# Patient Record
Sex: FEMALE | Race: BLACK OR AFRICAN AMERICAN | Employment: OTHER | URBAN - METROPOLITAN AREA
[De-identification: names, ages, dates, MRNs, and addresses within clinical notes are randomized per-mention and may not be internally consistent; named-entity substitution may affect disease eponyms.]

---

## 2017-01-03 ENCOUNTER — TELEPHONE (OUTPATIENT)
Dept: ENDOCRINOLOGY | Age: 72
End: 2017-01-03

## 2017-01-03 NOTE — TELEPHONE ENCOUNTER
I got the Arterial dopplers ,not vein study     Ultrasound shows blockage in the legs and would like her to see the vascular doctor who had placed stents in the leg  - if she doesnot have a VAscular surgeon  then refer to Vascular surgeon  At  for PAD    She is also supposed to get labs done for high calcium here - already ordered

## 2017-01-03 NOTE — TELEPHONE ENCOUNTER
Per Dr. Homer Sagastume, informed her of note below. Verbalized understanding. Stated she will have pt follow up with vascular doctor and pt will come in some time this week to have lab work done.  No further questions or concerns at this time

## 2017-01-03 NOTE — TELEPHONE ENCOUNTER
Patient's daughter called stating the patient had a circulation test done and also a venous doppler done on her legs for blood clots; she wants to know if we received the results and if anything needs to be done.

## 2017-01-13 ENCOUNTER — TELEPHONE (OUTPATIENT)
Dept: ENDOCRINOLOGY | Age: 72
End: 2017-01-13

## 2017-01-13 NOTE — TELEPHONE ENCOUNTER
Dr. Tereso May office says they received paperwork from our office regarding diabetic shoes. Dr. Tereso May office says the can not read the fax because writing is too small and to re-submit fax to [phone].

## 2017-03-10 RX ORDER — L-METHYLFOLATE-ALGAE-VIT B12-B6 CAP 3-90.314-2-35 MG 3-90.314-2-35 MG
CAP ORAL
Qty: 180 CAP | Refills: 9 | Status: SHIPPED | OUTPATIENT
Start: 2017-03-10 | End: 2019-04-08

## 2017-04-20 ENCOUNTER — ED HISTORICAL/CONVERTED ENCOUNTER (OUTPATIENT)
Dept: OTHER | Age: 72
End: 2017-04-20

## 2017-04-20 DIAGNOSIS — E11.65 TYPE 2 DIABETES MELLITUS WITH HYPERGLYCEMIA, WITH LONG-TERM CURRENT USE OF INSULIN (HCC): Primary | ICD-10-CM

## 2017-04-20 DIAGNOSIS — Z79.4 TYPE 2 DIABETES MELLITUS WITH HYPERGLYCEMIA, WITH LONG-TERM CURRENT USE OF INSULIN (HCC): Primary | ICD-10-CM

## 2017-04-20 RX ORDER — METFORMIN HYDROCHLORIDE 1000 MG/1
TABLET ORAL
Qty: 60 TAB | Refills: 11 | Status: SHIPPED | OUTPATIENT
Start: 2017-04-20 | End: 2017-12-19 | Stop reason: SDUPTHER

## 2017-06-30 ENCOUNTER — HOSPITAL ENCOUNTER (OUTPATIENT)
Dept: LAB | Age: 72
Discharge: HOME | End: 2017-06-30
Payer: MEDICARE

## 2017-06-30 PROCEDURE — 83036 HEMOGLOBIN GLYCOSYLATED A1C: CPT

## 2017-06-30 PROCEDURE — 36415 COLL VENOUS BLD VENIPUNCTURE: CPT

## 2017-06-30 PROCEDURE — 80061 LIPID PANEL: CPT

## 2017-06-30 PROCEDURE — 80053 COMPREHEN METABOLIC PANEL: CPT

## 2017-07-06 RX ORDER — LIRAGLUTIDE 6 MG/ML
INJECTION SUBCUTANEOUS
Qty: 9 ML | Refills: 0 | Status: SHIPPED | OUTPATIENT
Start: 2017-07-06 | End: 2017-08-10 | Stop reason: SDUPTHER

## 2017-07-18 DIAGNOSIS — I10 ESSENTIAL HYPERTENSION: ICD-10-CM

## 2017-07-18 RX ORDER — VALSARTAN AND HYDROCHLOROTHIAZIDE 320; 25 MG/1; MG/1
TABLET, FILM COATED ORAL
Qty: 30 TAB | Refills: 0 | Status: SHIPPED | OUTPATIENT
Start: 2017-07-18 | End: 2017-12-19 | Stop reason: ALTCHOICE

## 2017-07-26 ENCOUNTER — OFFICE VISIT (OUTPATIENT)
Dept: ENDOCRINOLOGY | Age: 72
End: 2017-07-26

## 2017-07-26 ENCOUNTER — HOSPITAL ENCOUNTER (OUTPATIENT)
Dept: LAB | Age: 72
Discharge: HOME | End: 2017-07-26
Payer: MEDICARE

## 2017-07-26 VITALS
RESPIRATION RATE: 16 BRPM | SYSTOLIC BLOOD PRESSURE: 150 MMHG | HEART RATE: 86 BPM | TEMPERATURE: 98.5 F | OXYGEN SATURATION: 99 % | DIASTOLIC BLOOD PRESSURE: 67 MMHG | HEIGHT: 63 IN

## 2017-07-26 DIAGNOSIS — I73.9 PAD (PERIPHERAL ARTERY DISEASE) (HCC): ICD-10-CM

## 2017-07-26 DIAGNOSIS — E34.9: ICD-10-CM

## 2017-07-26 DIAGNOSIS — G73.7: ICD-10-CM

## 2017-07-26 DIAGNOSIS — E78.2 MIXED HYPERLIPIDEMIA: ICD-10-CM

## 2017-07-26 DIAGNOSIS — E11.59 DM TYPE 2 CAUSING VASCULAR DISEASE (HCC): ICD-10-CM

## 2017-07-26 DIAGNOSIS — I10 ESSENTIAL HYPERTENSION: ICD-10-CM

## 2017-07-26 PROCEDURE — 82043 UR ALBUMIN QUANTITATIVE: CPT

## 2017-07-26 NOTE — PROGRESS NOTES
[facility]               Denia Reed MD      [address] [address] FV:[phone] Fax [phone]       [address] [facility] QJ:[phone]       Rosa Garcia  1945        Chief Complaint   Patient presents with    Diabetes       History of Present Illness: Rosa Garcia is a 81 y.o. female here for fu  of  Type 2 Diabetes Mellitus. March 19th 2016 she had toe right  amputation   PAD PCI , stenting Seen by Vascular surgeon, at [facility] at [address]  - Dr Tamy Earl    her meter broke   BG has improved with insulin   She was found to have anemia - was referred by PCP to hematologist   No blood loss    Compliant with meds      SMBG 2 / day       Reports compliance with the medications      Type 2 Diabetes was diagnosed 30 yrs ago. End organ effects of diabetes: peripheral neuropathy. Cardiovascular risk factors: dyslipidemia, diabetes mellitus, obesity, sedentary life style, hypertension       Wt Readings from Last 3 Encounters:   10/25/16 179 lb 1.6 oz (81.2 kg)   07/18/16 176 lb (79.8 kg)   04/11/16 170 lb 8 oz (77.3 kg)       BP Readings from Last 3 Encounters:   07/26/17 150/67   12/12/16 153/59   10/25/16 168/81           Past Medical History:   Diagnosis Date    Diabetes mellitus ([facility] [address]     HTN (hypertension)     Hyperlipidemia     Neuropathy (HCC)     PAD (peripheral artery disease) (HCC)     PCI    Sciatica      Current Outpatient Prescriptions   Medication Sig    valsartan-hydroCHLOROthiazide (DIOVAN-HCT) 320-25 mg per tablet TAKE 1 TABLET BY MOUTH EVERY DAY    VICTOZA 3-BANDAR 0.6 mg/0.1 mL (18 mg/3 mL) sub-q pen ADMINISTER 1.8 MG UNDER THE SKIN DAILY    metFORMIN (GLUCOPHAGE) 1,000 mg tablet TAKE 1 TABLET BY MOUTH TWICE DAILY WITH MEALS    METANX, ALGAL OIL, 3 mg-35 mg-2 mg -90.314 mg cap TAKE 1 CAPSULE BY MOUTH TWICE A DAY.     insulin glargine (LANTUS SOLOSTAR) 100 unit/mL (3 mL) pen Inject 15 units in the AM    glucose blood VI test strips (TRUE METRIX GLUCOSE TEST STRIP) strip Test blood glucose 3 time daily Dx Code: E11.65    Blood-Glucose Meter (TRUE METRIX AIR GLUCOSE METER) monitoring kit Test blood glucose 3 time daily Dx Code: E11.65    ferrous sulfate 325 mg (65 mg iron) tablet Take 1 Tab by mouth two (2) times a day.  L-Mfolate-B6 Phos-Methyl-B12 (METANX) 3-35-2 mg tab tab Take 1 Tab by mouth two (2) times a day.  Insulin Needles, Disposable, (CHELY PEN NEEDLE) 32 gauge x 5/32\" ndle USE TO INJECT LANTUS AND VICTOZA EVERY DAILY DX CODE: E11.65    gabapentin (NEURONTIN) 300 mg capsule Take 2 Caps by mouth three (3) times daily.  CARTIA  mg ER capsule TAKE 1 CAPSULE BY MOUTH DAILY    docusate sodium (COLACE) 100 mg capsule Take 1 Cap by mouth two (2) times a day.  polyethylene glycol (MIRALAX) 17 gram packet Take 1 Packet by mouth daily.  cyanocobalamin (VITAMIN B-12) 1,000 mcg tablet Take 1 Tab by mouth daily.  oxyCODONE IR (ROXICODONE) 5 mg immediate release tablet Take 5 mg by mouth every six (6) hours as needed for Pain.  cyclobenzaprine (FLEXERIL) 10 mg tablet Take  by mouth three (3) times daily as needed for Muscle Spasm(s).  HYDROcodone-acetaminophen (NORCO) 5-325 mg per tablet Take  by mouth.  Cholecalciferol, Vitamin D3, (VITAMIN D3) 2,000 unit cap capsule Take 2,000 Units by mouth daily.  atorvastatin (LIPITOR) 20 mg tablet TAKE 1 TABLET BY MOUTH NIGHTLY    Lancets misc Test blood glucose 3 times daily    vitamin e (E GEMS) 1,000 unit capsule Take 1,000 Units by mouth daily.  Lancets (MICROLET LANCET) misc Use to test blood glucose 3 times daily as directed. No current facility-administered medications for this visit.       No Known Allergies      Review of Systems:  - Constitutional Symptoms: no fevers, no chills  - Respiratory: no cough no shortness of breath  - Gastrointestinal: no dysphagia no  abdominal pain  - Musculoskeletal: no joint pains + weakness  - Integumentary: no rashes  - Neurological: + numbness, tingling, no  headaches  -     Physical Examination:   Blood pressure 150/67, pulse 86, temperature 98.5 °F (36.9 °C), temperature source Oral, resp. rate 16, height 5' 3\" (1.6 m), SpO2 99 %. Estimated body mass index is 31.73 kg/(m^2) as calculated from the following:    Height as of 10/25/16: 5' 3\" (1.6 m). -   Weight as of 10/25/16: 179 lb 1.6 oz (81.2 kg). - General: pleasant, no distress, good eye contact  - HEENT: no pallor, no periorbital edema, EOMI  - Neck: supple  - Cardiovascular: regular, normal S1 and S2  - Respiratory: clear to auscultation bilaterally  - Gastrointestinal: soft, nontender, nondistended,  BS +  Ext - toe amputation ,  - Neurological: alert and oriented  - Psychiatric: normal mood and affect  - Skin: color, texture, turgor normal.       Data Reviewed:     [] Glucose records reviewed. [] See glucose records for details (to be scanned).   [x] A1C  [x] Reviewed labs      Lab Results   Component Value Date/Time    Hemoglobin A1c 6.9 06/30/2017 02:00 PM    Hemoglobin A1c 10.8 10/25/2016 11:11 AM    Hemoglobin A1c 6.2 09/02/2015 10:01 AM    Microalb/Creat ratio (ug/mg creat.) 78.2 10/25/2016 11:11 AM    LDL, calculated 100 06/30/2017 02:00 PM    Creatinine 1.17 06/30/2017 02:00 PM      Lab Results   Component Value Date/Time    GFR est AA 55 06/30/2017 02:00 PM    GFR est non-AA 47 06/30/2017 02:00 PM    Creatinine 1.17 06/30/2017 02:00 PM    BUN 20 06/30/2017 02:00 PM    Sodium 142 06/30/2017 02:00 PM    Potassium 3.9 06/30/2017 02:00 PM    Chloride 100 06/30/2017 02:00 PM    CO2 24 06/30/2017 02:00 PM          Lab Results   Component Value Date/Time    Hemoglobin A1c 6.9 06/30/2017 02:00 PM    Hemoglobin A1c 10.8 10/25/2016 11:11 AM    Hemoglobin A1c 6.2 09/02/2015 10:01 AM    Microalb/Creat ratio (ug/mg creat.) 78.2 10/25/2016 11:11 AM    LDL, calculated 100 06/30/2017 02:00 PM    Creatinine 1.17 06/30/2017 02:00 PM          Assessment/Plan:       1. Type 2 Diabetes Mellitus with neuropathy,nephropathy  Lab Results   Component Value Date/Time    Hemoglobin A1c 6.9 06/30/2017 02:00 PM    Hemoglobin A1c (POC) 10.9 10/25/2016 10:25 AM     Controlled   lantus 15 units   She will continue metformin 500 mg BID   Victoza  1.8 mg daily . SMBG 3 x day  FLU annually ,Pneumovax ,aspirin daily,annual eye exam,microalbumin. 2.  HTN with microalbumin : diovan-HCT    3. Hyperlipidemia : Continue statin. 4. Peripheral neuropathy -she has more numbness and weakness  Need PT   Had PCI ,stent right LE  Metanx did not help         5 PAD -PCI - foot ulcers    6 Hypercalcemia - resolved         Thank you for allowing me to participate in the care of this patient.     Sheng Murry MD

## 2017-07-26 NOTE — PROGRESS NOTES
Jeremiah Jackson is a 81 y.o. female here for   Chief Complaint   Patient presents with    Diabetes       Functional glucose monitor and record keeping system? - yes  Eye exam within last year? - yes  Foot exam within last year? - yes    1. Have you been to the ER, urgent care clinic since your last visit? Hospitalized since your last visit? - no    2. Have you seen or consulted any other health care providers outside of the [address] [place] [address] since your last visit?   Include any pap smears or colon screening.- Dr James Rogers 2 weeks ago for neck pain      Lab Results   Component Value Date/Time    Hemoglobin A1c 6.9 06/30/2017 02:00 PM    Hemoglobin A1c (POC) 10.9 10/25/2016 10:25 AM       Wt Readings from Last 3 Encounters:   10/25/16 179 lb 1.6 oz (81.2 kg)   07/18/16 176 lb (79.8 kg)   04/11/16 170 lb 8 oz (77.3 kg)     Temp Readings from Last 3 Encounters:   07/26/17 98.5 °F (36.9 °C) (Oral)   12/12/16 95.9 °F (35.5 °C) (Oral)   10/25/16 97.1 °F (36.2 °C) (Oral)     BP Readings from Last 3 Encounters:   07/26/17 150/67   12/12/16 153/59   10/25/16 168/81     Pulse Readings from Last 3 Encounters:   07/26/17 86   12/12/16 84   10/25/16 94

## 2017-07-26 NOTE — Clinical Note
Needs PT at [facility] for myopathy , order in [address] [place] [address]

## 2017-07-27 PROBLEM — E11.59 DM TYPE 2 CAUSING VASCULAR DISEASE (HCC): Status: ACTIVE | Noted: 2017-07-27

## 2017-07-27 LAB
ALBUMIN/CREAT UR: 31.9 MG/G CREAT (ref 0–30)
CREAT UR-MCNC: 84 MG/DL
MICROALBUMIN UR-MCNC: 26.8 UG/ML

## 2017-08-16 ENCOUNTER — TELEPHONE (OUTPATIENT)
Dept: ENDOCRINOLOGY | Age: 72
End: 2017-08-16

## 2017-08-16 NOTE — TELEPHONE ENCOUNTER
Called [facility] PT dept to schedule pt appt; had to LV and waitng for a return call to schedule.

## 2017-12-18 DIAGNOSIS — E78.5 HYPERLIPIDEMIA, UNSPECIFIED HYPERLIPIDEMIA TYPE: ICD-10-CM

## 2017-12-18 DIAGNOSIS — I73.9 PAD (PERIPHERAL ARTERY DISEASE) (HCC): ICD-10-CM

## 2017-12-18 DIAGNOSIS — E55.9 VITAMIN D DEFICIENCY: ICD-10-CM

## 2017-12-18 DIAGNOSIS — E78.2 MIXED HYPERLIPIDEMIA: ICD-10-CM

## 2017-12-18 DIAGNOSIS — E83.52 HYPERCALCEMIA: ICD-10-CM

## 2017-12-18 DIAGNOSIS — E11.65 TYPE 2 DIABETES MELLITUS WITH HYPERGLYCEMIA, WITH LONG-TERM CURRENT USE OF INSULIN (HCC): ICD-10-CM

## 2017-12-18 DIAGNOSIS — Z79.4 TYPE 2 DIABETES MELLITUS WITH HYPERGLYCEMIA, WITH LONG-TERM CURRENT USE OF INSULIN (HCC): ICD-10-CM

## 2017-12-18 DIAGNOSIS — Z79.4 UNCONTROLLED TYPE 2 DIABETES MELLITUS WITH HYPERGLYCEMIA, WITH LONG-TERM CURRENT USE OF INSULIN (HCC): ICD-10-CM

## 2017-12-18 DIAGNOSIS — E11.65 UNCONTROLLED TYPE 2 DIABETES MELLITUS WITH HYPERGLYCEMIA, WITH LONG-TERM CURRENT USE OF INSULIN (HCC): ICD-10-CM

## 2017-12-18 DIAGNOSIS — E53.8 VITAMIN B12 DEFICIENCY: ICD-10-CM

## 2017-12-18 DIAGNOSIS — G62.9 NEUROPATHY: ICD-10-CM

## 2017-12-18 DIAGNOSIS — I10 ESSENTIAL HYPERTENSION: ICD-10-CM

## 2017-12-18 DIAGNOSIS — E11.49 TYPE II OR UNSPECIFIED TYPE DIABETES MELLITUS WITH NEUROLOGICAL MANIFESTATIONS, UNCONTROLLED(250.62) (HCC): ICD-10-CM

## 2017-12-18 NOTE — TELEPHONE ENCOUNTER
----- Message from Mony Mandujano sent at 12/18/2017 12:53 PM EST -----  Regarding: Dr Nickersno/rx refill  Pt (p) [phone], pt needs rx refill for her blood thinner medications and the rest of the ones listed in her file for [facility] pharmacy, the one listed in her chart. Pt said she is out of all of her medications.

## 2017-12-18 NOTE — TELEPHONE ENCOUNTER
----- Message from Codi Landaverde sent at 12/18/2017 12:53 PM EST -----  Regarding: Dr Nickerson/rx refill  Pt (p) [phone], pt needs rx refill for her blood thinner medications and the rest of the ones listed in her file for [facility] pharmacy, the one listed in her chart. Pt said she is out of all of her medications.

## 2017-12-19 RX ORDER — LANOLIN ALCOHOL/MO/W.PET/CERES
325 CREAM (GRAM) TOPICAL 2 TIMES DAILY
Qty: 60 TAB | Refills: 5 | Status: SHIPPED | OUTPATIENT
Start: 2017-12-19 | End: 2022-06-27

## 2017-12-19 RX ORDER — VALSARTAN AND HYDROCHLOROTHIAZIDE 320; 25 MG/1; MG/1
TABLET, FILM COATED ORAL
Qty: 30 TAB | Refills: 5 | Status: SHIPPED | OUTPATIENT
Start: 2017-12-19 | End: 2018-05-24 | Stop reason: ALTCHOICE

## 2017-12-19 RX ORDER — INSULIN GLARGINE 100 [IU]/ML
INJECTION, SOLUTION SUBCUTANEOUS
Qty: 15 ML | Refills: 5 | Status: SHIPPED | OUTPATIENT
Start: 2017-12-19 | End: 2018-01-31 | Stop reason: ALTCHOICE

## 2017-12-19 RX ORDER — ATORVASTATIN CALCIUM 20 MG/1
TABLET, FILM COATED ORAL
Qty: 30 TAB | Refills: 5 | Status: SHIPPED | OUTPATIENT
Start: 2017-12-19 | End: 2020-02-05 | Stop reason: SDUPTHER

## 2017-12-19 RX ORDER — PEN NEEDLE, DIABETIC 31 GX3/16"
NEEDLE, DISPOSABLE MISCELLANEOUS
Qty: 200 PEN NEEDLE | Refills: 3 | Status: SHIPPED | OUTPATIENT
Start: 2017-12-19 | End: 2019-02-05 | Stop reason: SDUPTHER

## 2017-12-19 RX ORDER — ACETAMINOPHEN 500 MG
2000 TABLET ORAL DAILY
Qty: 30 CAP | Refills: 5 | Status: SHIPPED | OUTPATIENT
Start: 2017-12-19 | End: 2019-04-08

## 2017-12-19 RX ORDER — GABAPENTIN 300 MG/1
600 CAPSULE ORAL 3 TIMES DAILY
Qty: 180 CAP | Refills: 5 | Status: SHIPPED | OUTPATIENT
Start: 2017-12-19 | End: 2018-10-25 | Stop reason: SDUPTHER

## 2017-12-19 RX ORDER — LANOLIN ALCOHOL/MO/W.PET/CERES
1000 CREAM (GRAM) TOPICAL DAILY
Qty: 30 TAB | Refills: 5 | Status: SHIPPED | OUTPATIENT
Start: 2017-12-19 | End: 2019-04-08

## 2017-12-19 RX ORDER — METFORMIN HYDROCHLORIDE 1000 MG/1
TABLET ORAL
Qty: 60 TAB | Refills: 11 | Status: SHIPPED | OUTPATIENT
Start: 2017-12-19 | End: 2019-01-02 | Stop reason: SDUPTHER

## 2017-12-19 RX ORDER — DILTIAZEM HYDROCHLORIDE 180 MG/1
CAPSULE, COATED, EXTENDED RELEASE ORAL
Qty: 30 CAP | Refills: 5 | Status: SHIPPED | OUTPATIENT
Start: 2017-12-19 | End: 2019-09-27

## 2018-01-31 ENCOUNTER — HOSPITAL ENCOUNTER (OUTPATIENT)
Dept: LAB | Age: 73
Discharge: HOME | End: 2018-01-31
Payer: MEDICARE

## 2018-01-31 ENCOUNTER — OFFICE VISIT (OUTPATIENT)
Dept: ENDOCRINOLOGY | Age: 73
End: 2018-01-31

## 2018-01-31 VITALS
TEMPERATURE: 97.9 F | HEART RATE: 84 BPM | DIASTOLIC BLOOD PRESSURE: 62 MMHG | HEIGHT: 63 IN | SYSTOLIC BLOOD PRESSURE: 144 MMHG | RESPIRATION RATE: 16 BRPM | OXYGEN SATURATION: 100 %

## 2018-01-31 DIAGNOSIS — E11.65 TYPE 2 DIABETES MELLITUS WITH HYPERGLYCEMIA, WITH LONG-TERM CURRENT USE OF INSULIN (HCC): Primary | ICD-10-CM

## 2018-01-31 DIAGNOSIS — I10 ESSENTIAL HYPERTENSION: ICD-10-CM

## 2018-01-31 DIAGNOSIS — E78.2 MIXED HYPERLIPIDEMIA: ICD-10-CM

## 2018-01-31 DIAGNOSIS — Z79.4 TYPE 2 DIABETES MELLITUS WITH HYPERGLYCEMIA, WITH LONG-TERM CURRENT USE OF INSULIN (HCC): Primary | ICD-10-CM

## 2018-01-31 DIAGNOSIS — I73.9 PAD (PERIPHERAL ARTERY DISEASE) (HCC): ICD-10-CM

## 2018-01-31 PROBLEM — E11.21 TYPE 2 DIABETES MELLITUS WITH NEPHROPATHY (HCC): Status: ACTIVE | Noted: 2018-01-31

## 2018-01-31 LAB
GLUCOSE POC: 252 MG/DL
HBA1C MFR BLD HPLC: 8.1 %

## 2018-01-31 PROCEDURE — 82043 UR ALBUMIN QUANTITATIVE: CPT

## 2018-01-31 RX ORDER — INSULIN GLARGINE 100 [IU]/ML
INJECTION, SOLUTION SUBCUTANEOUS
Qty: 15 ML | Refills: 6 | Status: SHIPPED | OUTPATIENT
Start: 2018-01-31 | End: 2019-04-03 | Stop reason: SDUPTHER

## 2018-01-31 RX ORDER — OXYCODONE AND ACETAMINOPHEN 5; 325 MG/1; MG/1
TABLET ORAL
Refills: 0 | COMMUNITY
Start: 2018-01-16 | End: 2019-09-27

## 2018-01-31 NOTE — MR AVS SNAPSHOT
[address] [facility] [address] 
[phone] Patient: Reyes Berger MRN: [id number] NCT:1/79/8004 Visit Information Date & Time Provider Department Dept. Phone Encounter #  
 1/31/2018  9:45 AM Krys Poole MD [facility] [phone] [id number] Upcoming Health Maintenance Date Due Hepatitis C Screening 1945 FOOT EXAM Q1 2/13/1957 DTaP/Tdap/Td series (1 - Tdap) 2/13/1968 BREAST CANCER SCRN MAMMOGRAM 2/13/1997 FOBT Q 1 YEAR AGE 50-75 2/13/1997 ZOSTER VACCINE AGE 60> 12/13/2006 OSTEOPOROSIS SCREENING (DEXA) 2/13/2012 Pneumococcal 65+ Low/Medium Risk (1 of 2 - PCV13) 2/13/2012 MEDICARE YEARLY EXAM 2/13/2012 Influenza Age 5 to Adult 8/1/2017 HEMOGLOBIN A1C Q6M 12/30/2017 LIPID PANEL Q1 6/30/2018 MICROALBUMIN Q1 7/26/2018 EYE EXAM RETINAL OR DILATED Q1 8/24/2018 GLAUCOMA SCREENING Q2Y 8/24/2019 Allergies as of 1/31/2018  Review Complete On: 1/31/2018 By: rKys Poole MD  
 No Known Allergies Current Immunizations  Never Reviewed No immunizations on file. Not reviewed this visit You Were Diagnosed With   
  
 Codes Comments Type 2 diabetes mellitus with hyperglycemia, with long-term current use of insulin (HCC)    -  Primary ICD-10-CM: E11.65, Z79.4 ICD-9-CM: 250.00, 790.29, V58.67 Essential hypertension     ICD-10-CM: I10 
ICD-9-CM: 401.9 Mixed hyperlipidemia     ICD-10-CM: E78.2 ICD-9-CM: 272.2 PAD (peripheral artery disease) (HCC)     ICD-10-CM: I73.9 ICD-9-CM: 443. 9 Vitals BP Pulse Temp Resp Height(growth percentile) SpO2  
 144/62 (BP 1 Location: Left arm, BP Patient Position: Sitting) 84 97.9 °F (36.6 °C) (Oral) 16 5' 3\" (1.6 m) 100% OB Status Smoking Status Menopause Never Smoker Vitals History Preferred Pharmacy Pharmacy Name Phone [facility] [address], [address] [place] [address] Bekah Poe AT [address] [phone] Your Updated Medication List  
  
   
This list is accurate as of: 1/31/18 10:39 AM.  Always use your most recent med list.  
  
  
  
  
 atorvastatin 20 mg tablet Commonly known as:  LIPITOR  
TAKE 1 TABLET BY MOUTH NIGHTLY Blood-Glucose Meter monitoring kit Commonly known as:  TRUE METRIX AIR GLUCOSE METER Test blood glucose 3 time daily Dx Code: E11.65 Cholecalciferol (Vitamin D3) 2,000 unit Cap capsule Commonly known as:  VITAMIN D3 Take 2,000 Units by mouth daily. cyanocobalamin 1,000 mcg tablet Commonly known as:  VITAMIN B-12 Take 1 Tab by mouth daily. cyclobenzaprine 10 mg tablet Commonly known as:  FLEXERIL Take  by mouth three (3) times daily as needed for Muscle Spasm(s). dilTIAZem  mg ER capsule Commonly known as:  CARTIA XT  
TAKE 1 CAPSULE BY MOUTH DAILY docusate sodium 100 mg capsule Commonly known as:  Brisa Lundborg Take 1 Cap by mouth two (2) times a day. ferrous sulfate 325 mg (65 mg iron) tablet Take 1 Tab by mouth two (2) times a day.  
  
 gabapentin 300 mg capsule Commonly known as:  NEURONTIN Take 2 Caps by mouth three (3) times daily. glucose blood VI test strips strip Commonly known as:  TRUE METRIX GLUCOSE TEST STRIP Test blood glucose 3 time daily Dx Code: E11.65 HYDROcodone-acetaminophen 5-325 mg per tablet Commonly known as:  Kimberly Driss Take  by mouth. * insulin glargine 100 unit/mL (3 mL) Inpn Commonly known as:  LANTUS SOLOSTAR Inject 15 units in the AM  
  
 * LANTUS SOLOSTAR 100 unit/mL (3 mL) Inpn Generic drug:  insulin glargine ADMINISTER 15 UNITS UNDER THE SKIN IN THE MORNING Insulin Needles (Disposable) 32 gauge x 5/32\" Ndle Commonly known as:  [place] Pen Needle USE TO INJECT LANTUS AND VICTOZA EVERY DAILY DX CODE: E11.65  
  
 L-Mfolate-B6 Phos-Methyl-B12 3-35-2 mg Tab tab Commonly known as:  Lisy Estes Take 1 Tab by mouth two (2) times a day. * Lancets Misc Commonly known as:  Pearla Level Use to test blood glucose 3 times daily as directed. * Lancets Misc Test blood glucose 3 times daily Liraglutide 0.6 mg/0.1 mL (18 mg/3 mL) Pnij Commonly known as:  VICTOZA 3-BANDAR  
1.8 mg by SubCUTAneous route every morning. METANX (ALGAL OIL) 3 mg-35 mg-2 mg -90.314 mg Cap Generic drug:  levomefolate-B6-meB12-algal oil TAKE 1 CAPSULE BY MOUTH TWICE A DAY. metFORMIN 1,000 mg tablet Commonly known as:  GLUCOPHAGE  
TAKE 1 TABLET BY MOUTH TWICE DAILY WITH MEALS  
  
 oxyCODONE IR 5 mg immediate release tablet Commonly known as:  Bushra Peal Take 5 mg by mouth every six (6) hours as needed for Pain. oxyCODONE-acetaminophen 5-325 mg per tablet Commonly known as:  PERCOCET TK 1 OR 2 TS PO Q 4 H PRN P / CRAMPS  
  
 polyethylene glycol 17 gram packet Commonly known as:  Giana Darnell Take 1 Packet by mouth daily. valsartan-hydroCHLOROthiazide 320-25 mg per tablet Commonly known as:  DIOVAN-HCT  
TAKE 1 TABLET BY MOUTH EVERY DAY  
  
 vitamin e 1,000 unit capsule Commonly known as:  E GEMS Take 1,000 Units by mouth daily. * Notice: This list has 4 medication(s) that are the same as other medications prescribed for you. Read the directions carefully, and ask your doctor or other care provider to review them with you. We Performed the Following AMB POC GLUCOSE, QUANTITATIVE, BLOOD [06577 CPT(R)] AMB POC HEMOGLOBIN A1C [78629 CPT(R)] MICROALBUMIN, UR, RAND W/ MICROALBUMIN/CREA RATIO [id number] CPT(R)] Patient Instructions Lantus 22 units in AM  
 
 
  
Introducing \[facility] [place] [facility]\"" & HEALTH SERVICES! [facility] introduces [employer] patient portal. Now you can access parts of your medical record, email your doctor's office, and request medication refills online. 1. In your internet browser, go to [email or web address] 2. Click on the First Time User? Click Here link in the Sign In box. You will see the New Member Sign Up page. 3. Enter your [facility] Access Code exactly as it appears below. You will not need to use this code after youve completed the sign-up process. If you do not sign up before the expiration date, you must request a new code. · [facility] Access Code: [email or web address] Expires: 5/1/2018 10:39 AM 
 
4. Enter the last four digits of your Social Security Number (xxxx) and Date of Birth (mm/dd/yyyy) as indicated and click Submit. You will be taken to the next sign-up page. 5. Create a Nugg-itt ID. This will be your [facility] login ID and cannot be changed, so think of one that is secure and easy to remember. 6. Create a [facility] password. You can change your password at any time. 7. Enter your Password Reset Question and Answer. This can be used at a later time if you forget your password. 8. Enter your e-mail address. You will receive e-mail notification when new information is available in [address] 9. Click Sign Up. You can now view and download portions of your medical record. 10. Click the Download Summary menu link to download a portable copy of your medical information. If you have questions, please visit the Frequently Asked Questions section of the [facility] website. Remember, [facility] is NOT to be used for urgent needs. For medical emergencies, dial 911. Now available from your iPhone and Android! Please provide this summary of care documentation to your next provider. Your primary care clinician is listed as Shane Ho. If you have any questions after today's visit, please call [phone].

## 2018-01-31 NOTE — PROGRESS NOTES
[facility] DIABETES AND ENDOCRINOLOGY               Rossy Trejo MD      [address] [address] IX:[phone] Fax [phone]       [address] [facility] LD:[phone]       Kathrin Jeter  1945        Chief Complaint   Patient presents with    Diabetes       History of Present Illness: Kathrin Jeter is a 81 y.o. female here for fu  of  Type 2 Diabetes Mellitus. January 2018: Had cervical fusion  Posthospitalization her blood glucose reportedly is high. Did not bring the meter or the logbook. No steroids. PAD PCI , stenting Seen by Vascular surgeon, at [facility] at [address]  - Dr Hardy Maria with meds      SMBG 2 / day       Reports compliance with the medications      Type 2 Diabetes was diagnosed 30 yrs ago. End organ effects of diabetes: peripheral neuropathy. Cardiovascular risk factors: dyslipidemia, diabetes mellitus, obesity, sedentary life style, hypertension       Wt Readings from Last 3 Encounters:   10/25/16 179 lb 1.6 oz (81.2 kg)   07/18/16 176 lb (79.8 kg)   04/11/16 170 lb 8 oz (77.3 kg)       BP Readings from Last 3 Encounters:   01/31/18 144/62   07/26/17 150/67   12/12/16 153/59           Past Medical History:   Diagnosis Date    Diabetes mellitus ([facility] [address]     HTN (hypertension)     Hyperlipidemia     Neuropathy     PAD (peripheral artery disease) (HCC)     PCI    Sciatica      Current Outpatient Prescriptions   Medication Sig    oxyCODONE-acetaminophen (PERCOCET) 5-325 mg per tablet TK 1 OR 2 TS PO Q 4 H PRN P / CRAMPS    LANTUS SOLOSTAR 100 unit/mL (3 mL) inpn ADMINISTER 15 UNITS UNDER THE SKIN IN THE MORNING    Liraglutide (VICTOZA 3-BANDAR) 0.6 mg/0.1 mL (18 mg/3 mL) pnij 1.8 mg by SubCUTAneous route every morning.     valsartan-hydroCHLOROthiazide (DIOVAN-HCT) 320-25 mg per tablet TAKE 1 TABLET BY MOUTH EVERY DAY    metFORMIN (GLUCOPHAGE) 1,000 mg tablet TAKE 1 TABLET BY MOUTH TWICE DAILY WITH MEALS    L-Mfolate-B6 Phos-Methyl-B12 (METANX) 3-35-2 mg tab tab Take 1 Tab by mouth two (2) times a day.  Insulin Needles, Disposable, (CHELY PEN NEEDLE) 32 gauge x 5/32\" ndle USE TO INJECT LANTUS AND VICTOZA EVERY DAILY DX CODE: E11.65    insulin glargine (LANTUS SOLOSTAR) 100 unit/mL (3 mL) inpn Inject 15 units in the AM    glucose blood VI test strips (TRUE METRIX GLUCOSE TEST STRIP) strip Test blood glucose 3 time daily Dx Code: E11.65    gabapentin (NEURONTIN) 300 mg capsule Take 2 Caps by mouth three (3) times daily.  ferrous sulfate 325 mg (65 mg iron) tablet Take 1 Tab by mouth two (2) times a day.  Cholecalciferol, Vitamin D3, (VITAMIN D3) 2,000 unit cap capsule Take 2,000 Units by mouth daily.  cyanocobalamin (VITAMIN B-12) 1,000 mcg tablet Take 1 Tab by mouth daily.  dilTIAZem CD (CARTIA XT) 180 mg ER capsule TAKE 1 CAPSULE BY MOUTH DAILY    atorvastatin (LIPITOR) 20 mg tablet TAKE 1 TABLET BY MOUTH NIGHTLY    METANX, ALGAL OIL, 3 mg-35 mg-2 mg -90.314 mg cap TAKE 1 CAPSULE BY MOUTH TWICE A DAY.  Blood-Glucose Meter (TRUE METRIX AIR GLUCOSE METER) monitoring kit Test blood glucose 3 time daily Dx Code: E11.65    docusate sodium (COLACE) 100 mg capsule Take 1 Cap by mouth two (2) times a day.  polyethylene glycol (MIRALAX) 17 gram packet Take 1 Packet by mouth daily.  oxyCODONE IR (ROXICODONE) 5 mg immediate release tablet Take 5 mg by mouth every six (6) hours as needed for Pain.  cyclobenzaprine (FLEXERIL) 10 mg tablet Take  by mouth three (3) times daily as needed for Muscle Spasm(s).  HYDROcodone-acetaminophen (NORCO) 5-325 mg per tablet Take  by mouth.  Lancets misc Test blood glucose 3 times daily    vitamin e (E GEMS) 1,000 unit capsule Take 1,000 Units by mouth daily.  Lancets (MICROLET LANCET) misc Use to test blood glucose 3 times daily as directed. No current facility-administered medications for this visit.       No Known Allergies      Review of Systems:  - Constitutional Symptoms: no fevers, no chills  - Respiratory: no cough no shortness of breath  - Gastrointestinal: no dysphagia no  abdominal pain  - Musculoskeletal: no joint pains + weakness  - Integumentary: no rashes  - Neurological: + numbness, tingling, no  headaches  -     Physical Examination:   Blood pressure 144/62, pulse 84, temperature 97.9 °F (36.6 °C), temperature source Oral, resp. rate 16, height 5' 3\" (1.6 m), SpO2 100 %. Estimated body mass index is 31.73 kg/(m^2) as calculated from the following:    Height as of 10/25/16: 5' 3\" (1.6 m). -   Weight as of 10/25/16: 179 lb 1.6 oz (81.2 kg). - General: pleasant, no distress, good eye contact  - HEENT: no pallor, no periorbital edema, EOMI  - Neck: supple  - Cardiovascular: regular, normal S1 and S2  - Respiratory: clear to auscultation bilaterally  - Gastrointestinal: soft, nontender, nondistended,  BS +  Ext - toe amputation ,  - Neurological: alert and oriented  - Psychiatric: normal mood and affect  - Skin: color, texture, turgor normal.     Diabetic foot exam: January 2018    Left:    Vibratory sensation absent    Filament test absent sensation with micro filament   Pulse DP: 1+ (weak)    Deformities: Onychomycosis, history of foot ulcers, callus    Right:    Vibratory sensation absent   Filament test absent sensation with micro filament   Pulse DP: 1+ (weak)              Deformities: Great toe amputation      Data Reviewed:     [] Glucose records reviewed. [] See glucose records for details (to be scanned).   [x] A1C  [x] Reviewed labs      Lab Results   Component Value Date/Time    Hemoglobin A1c 6.9 06/30/2017 02:00 PM    Hemoglobin A1c 10.8 10/25/2016 11:11 AM    Hemoglobin A1c 6.2 09/02/2015 10:01 AM    Microalb/Creat ratio (ug/mg creat.) 31.9 07/26/2017 01:52 PM    LDL, calculated 100 06/30/2017 02:00 PM    Creatinine 1.17 06/30/2017 02:00 PM      Lab Results   Component Value Date/Time    GFR est AA 55 06/30/2017 02:00 PM    GFR est non-AA 47 06/30/2017 02:00 PM    Creatinine 1.17 06/30/2017 02:00 PM    BUN 20 06/30/2017 02:00 PM    Sodium 142 06/30/2017 02:00 PM    Potassium 3.9 06/30/2017 02:00 PM    Chloride 100 06/30/2017 02:00 PM    CO2 24 06/30/2017 02:00 PM          Lab Results   Component Value Date/Time    Hemoglobin A1c 6.9 06/30/2017 02:00 PM    Hemoglobin A1c 10.8 10/25/2016 11:11 AM    Hemoglobin A1c 6.2 09/02/2015 10:01 AM    Microalb/Creat ratio (ug/mg creat.) 31.9 07/26/2017 01:52 PM    LDL, calculated 100 06/30/2017 02:00 PM    Creatinine 1.17 06/30/2017 02:00 PM          Assessment/Plan:       1. Type 2 Diabetes Mellitus with neuropathy,nephropathy  Lab Results   Component Value Date/Time    Hemoglobin A1c 6.9 06/30/2017 02:00 PM    Hemoglobin A1c (POC) 8.1 01/31/2018 10:14 AM     unControlled   lantus 22 units   She will continue metformin 500 mg BID   Victoza  1.8 mg daily . SMBG 3 x day  FLU annually ,Pneumovax ,aspirin daily,annual eye exam,microalbumin. 2.  HTN with microalbumin : diovan-HCT    3. Hyperlipidemia : Continue statin. 4. Peripheral neuropathy -she has more numbness and weakness  Had PCI ,stent right LE  Metanx did not help         5 PAD -PCI - foot ulcers  March 19th 2016 she had toe right  amputation, followed by wound clinic    6 Hypercalcemia - resolved         Thank you for allowing me to participate in the care of this patient.     Annie Pryor MD

## 2018-01-31 NOTE — PROGRESS NOTES
Antonio Early is a 81 y.o. female here for   Chief Complaint   Patient presents with    Diabetes       Functional glucose monitor and record keeping system? - yes  Eye exam within last year? - on file 8/24/17    1. Have you been to the ER, urgent care clinic since your last visit? Hospitalized since your last visit? - neck surgery on 1/15/18 at [facility]    2. Have you seen or consulted any other health care providers outside of the [address] [place] [address] since your last visit?   Include any pap smears or colon screening.- PCP      Lab Results   Component Value Date/Time    Hemoglobin A1c 6.9 06/30/2017 02:00 PM    Hemoglobin A1c (POC) 10.9 10/25/2016 10:25 AM       Wt Readings from Last 3 Encounters:   10/25/16 179 lb 1.6 oz (81.2 kg)   07/18/16 176 lb (79.8 kg)   04/11/16 170 lb 8 oz (77.3 kg)     Temp Readings from Last 3 Encounters:   07/26/17 98.5 °F (36.9 °C) (Oral)   12/12/16 95.9 °F (35.5 °C) (Oral)   10/25/16 97.1 °F (36.2 °C) (Oral)     BP Readings from Last 3 Encounters:   07/26/17 150/67   12/12/16 153/59   10/25/16 168/81     Pulse Readings from Last 3 Encounters:   07/26/17 86   12/12/16 84   10/25/16 94

## 2018-02-01 LAB
ALBUMIN/CREAT UR: 19.2 MG/G CREAT (ref 0–30)
CREAT UR-MCNC: 99.3 MG/DL
MICROALBUMIN UR-MCNC: 19.1 UG/ML

## 2018-03-10 DIAGNOSIS — Z79.4 UNCONTROLLED TYPE 2 DIABETES MELLITUS WITH HYPERGLYCEMIA, WITH LONG-TERM CURRENT USE OF INSULIN (HCC): ICD-10-CM

## 2018-03-10 DIAGNOSIS — E11.65 UNCONTROLLED TYPE 2 DIABETES MELLITUS WITH HYPERGLYCEMIA, WITH LONG-TERM CURRENT USE OF INSULIN (HCC): ICD-10-CM

## 2018-03-10 RX ORDER — PEN NEEDLE, DIABETIC 32GX 5/32"
NEEDLE, DISPOSABLE MISCELLANEOUS
Qty: 200 PEN NEEDLE | Refills: 11 | Status: SHIPPED | OUTPATIENT
Start: 2018-03-10 | End: 2022-01-01

## 2018-04-25 LAB — CREATININE, EXTERNAL: 1.13

## 2018-05-12 DIAGNOSIS — Z79.4 TYPE 2 DIABETES MELLITUS WITH HYPERGLYCEMIA, WITH LONG-TERM CURRENT USE OF INSULIN (HCC): ICD-10-CM

## 2018-05-12 DIAGNOSIS — E11.65 TYPE 2 DIABETES MELLITUS WITH HYPERGLYCEMIA, WITH LONG-TERM CURRENT USE OF INSULIN (HCC): ICD-10-CM

## 2018-05-13 RX ORDER — METFORMIN HYDROCHLORIDE 1000 MG/1
TABLET ORAL
Qty: 60 TAB | Refills: 0 | Status: SHIPPED | OUTPATIENT
Start: 2018-05-13 | End: 2018-05-24 | Stop reason: SDUPTHER

## 2018-05-24 ENCOUNTER — OFFICE VISIT (OUTPATIENT)
Dept: ENDOCRINOLOGY | Age: 73
End: 2018-05-24

## 2018-05-24 VITALS
HEIGHT: 63 IN | SYSTOLIC BLOOD PRESSURE: 155 MMHG | TEMPERATURE: 98.1 F | OXYGEN SATURATION: 99 % | DIASTOLIC BLOOD PRESSURE: 68 MMHG | RESPIRATION RATE: 16 BRPM | HEART RATE: 97 BPM

## 2018-05-24 DIAGNOSIS — E11.65 TYPE 2 DIABETES MELLITUS WITH HYPERGLYCEMIA, WITH LONG-TERM CURRENT USE OF INSULIN (HCC): Primary | ICD-10-CM

## 2018-05-24 DIAGNOSIS — E78.2 MIXED HYPERLIPIDEMIA: ICD-10-CM

## 2018-05-24 DIAGNOSIS — Z79.4 TYPE 2 DIABETES MELLITUS WITH HYPERGLYCEMIA, WITH LONG-TERM CURRENT USE OF INSULIN (HCC): Primary | ICD-10-CM

## 2018-05-24 DIAGNOSIS — I10 ESSENTIAL HYPERTENSION: ICD-10-CM

## 2018-05-24 LAB
GLUCOSE POC: 236 MG/DL
HBA1C MFR BLD HPLC: 6.3 %

## 2018-05-24 RX ORDER — LOSARTAN POTASSIUM 50 MG/1
TABLET ORAL
COMMUNITY
End: 2018-06-28

## 2018-05-24 NOTE — PROGRESS NOTES
[facility] DIABETES AND ENDOCRINOLOGY               Silvano Hanks MD       Daiana Johnson  1945        Chief Complaint   Patient presents with    Diabetes    Cholesterol Problem    Hypertension       History of Present Illness: Daiana Johnson is a 72 y.o. female here for fu  of  Type 2 Diabetes Mellitus. She had severe anemia status post blood transfusion-being worked up, had EGD and colonoscopy which was normal   she was in the rehab, blood glucose was elevated. January 2018: Had cervical fusion  Posthospitalization her blood glucose reportedly is high. Did not bring the meter or the logbook. No steroids. PAD PCI , stenting Seen by Vascular surgeon, at [address] [place] [address] at [address]  - Dr Gio Stoddard with meds      SMBG 2 / day       Type 2 Diabetes was diagnosed 30 yrs ago. End organ effects of diabetes: peripheral neuropathy. Cardiovascular risk factors: dyslipidemia, diabetes mellitus, obesity, sedentary life style, hypertension       Wt Readings from Last 3 Encounters:   10/25/16 179 lb 1.6 oz (81.2 kg)   07/18/16 176 lb (79.8 kg)   04/11/16 170 lb 8 oz (77.3 kg)       BP Readings from Last 3 Encounters:   05/24/18 155/68   01/31/18 144/62   07/26/17 150/67           Past Medical History:   Diagnosis Date    Diabetes mellitus ([facility] [address]     HTN (hypertension)     Hyperlipidemia     Neuropathy     PAD (peripheral artery disease) (HCC)     PCI    Sciatica      Current Outpatient Prescriptions   Medication Sig    losartan (COZAAR) 50 mg tablet losartan 50 mg tablet    CHELY PEN NEEDLE 32 gauge x 5/32\" ndle USE TO INJECT LANTUS AND VICTOZA EVERY DAY    oxyCODONE-acetaminophen (PERCOCET) 5-325 mg per tablet TK 1 OR 2 TS PO Q 4 H PRN P / CRAMPS    insulin glargine (LANTUS SOLOSTAR) 100 unit/mL (3 mL) inpn ADMINISTER 22 UNITS UNDER THE SKIN IN THE MORNING    Liraglutide (VICTOZA 3-BANDAR) 0.6 mg/0.1 mL (18 mg/3 mL) pnij 1.8 mg by SubCUTAneous route every morning.     metFORMIN (GLUCOPHAGE) 1,000 mg tablet TAKE 1 TABLET BY MOUTH TWICE DAILY WITH MEALS    L-Mfolate-B6 Phos-Methyl-B12 (METANX) 3-35-2 mg tab tab Take 1 Tab by mouth two (2) times a day.  Insulin Needles, Disposable, (CHELY PEN NEEDLE) 32 gauge x 5/32\" ndle USE TO INJECT LANTUS AND VICTOZA EVERY DAILY DX CODE: E11.65    glucose blood VI test strips (TRUE METRIX GLUCOSE TEST STRIP) strip Test blood glucose 3 time daily Dx Code: E11.65    gabapentin (NEURONTIN) 300 mg capsule Take 2 Caps by mouth three (3) times daily. (Patient taking differently: Take 600 mg by mouth five (5) times daily.)    ferrous sulfate 325 mg (65 mg iron) tablet Take 1 Tab by mouth two (2) times a day.  Cholecalciferol, Vitamin D3, (VITAMIN D3) 2,000 unit cap capsule Take 2,000 Units by mouth daily.  cyanocobalamin (VITAMIN B-12) 1,000 mcg tablet Take 1 Tab by mouth daily.  dilTIAZem CD (CARTIA XT) 180 mg ER capsule TAKE 1 CAPSULE BY MOUTH DAILY    atorvastatin (LIPITOR) 20 mg tablet TAKE 1 TABLET BY MOUTH NIGHTLY    METANX, ALGAL OIL, 3 mg-35 mg-2 mg -90.314 mg cap TAKE 1 CAPSULE BY MOUTH TWICE A DAY.  Blood-Glucose Meter (TRUE METRIX AIR GLUCOSE METER) monitoring kit Test blood glucose 3 time daily Dx Code: E11.65    docusate sodium (COLACE) 100 mg capsule Take 1 Cap by mouth two (2) times a day.  polyethylene glycol (MIRALAX) 17 gram packet Take 1 Packet by mouth daily.  oxyCODONE IR (ROXICODONE) 5 mg immediate release tablet Take 5 mg by mouth every six (6) hours as needed for Pain.  cyclobenzaprine (FLEXERIL) 10 mg tablet Take  by mouth three (3) times daily as needed for Muscle Spasm(s).  HYDROcodone-acetaminophen (NORCO) 5-325 mg per tablet Take  by mouth.  Lancets misc Test blood glucose 3 times daily    vitamin e (E GEMS) 1,000 unit capsule Take 1,000 Units by mouth daily.  Lancets (MICROLET LANCET) misc Use to test blood glucose 3 times daily as directed.     valsartan-hydroCHLOROthiazide (DIOVAN-HCT) 320-25 mg per tablet TAKE 1 TABLET BY MOUTH EVERY DAY     No current facility-administered medications for this visit. No Known Allergies      Review of Systems:  - Constitutional Symptoms: no fevers, no chills  - Respiratory: no cough no shortness of breath  - Gastrointestinal: no dysphagia no  abdominal pain  - Musculoskeletal: no joint pains + weakness  - Integumentary: no rashes  - Neurological: + numbness, tingling, no  headaches  -     Physical Examination:   Blood pressure 155/68, pulse 97, temperature 98.1 °F (36.7 °C), temperature source Oral, resp. rate 16, height 5' 3\" (1.6 m), SpO2 99 %. Estimated body mass index is 31.73 kg/(m^2) as calculated from the following:    Height as of 10/25/16: 5' 3\" (1.6 m). -   Weight as of 10/25/16: 179 lb 1.6 oz (81.2 kg). - General: pleasant, no distress, good eye contact  - HEENT: no pallor, no periorbital edema, EOMI  - Neck: supple  - Cardiovascular: regular, normal S1 and S2  - Respiratory: clear to auscultation bilaterally  - Gastrointestinal: soft, nontender, nondistended,  BS +  Ext - toe amputation ,  - Neurological: alert and oriented  - Psychiatric: normal mood and affect  - Skin: color, texture, turgor normal.     Diabetic foot exam: January 2018    Left:    Vibratory sensation absent    Filament test absent sensation with micro filament   Pulse DP: 1+ (weak)    Deformities: Onychomycosis, history of foot ulcers, callus    Right:    Vibratory sensation absent   Filament test absent sensation with micro filament   Pulse DP: 1+ (weak)              Deformities: Great toe amputation      Data Reviewed:     [] Glucose records reviewed. [] See glucose records for details (to be scanned).   [x] A1C  [x] Reviewed labs      Lab Results   Component Value Date/Time    Hemoglobin A1c 6.9 (H) 06/30/2017 02:00 PM    Hemoglobin A1c 10.8 (H) 10/25/2016 11:11 AM    Hemoglobin A1c 6.2 (H) 09/02/2015 10:01 AM    Microalb/Creat ratio (ug/mg creat.) 19.2 01/31/2018 11:38 AM    LDL, calculated 100 (H) 06/30/2017 02:00 PM    Creatinine 1.17 (H) 06/30/2017 02:00 PM      Lab Results   Component Value Date/Time    GFR est AA 55 (L) 06/30/2017 02:00 PM    GFR est non-AA 47 (L) 06/30/2017 02:00 PM    Creatinine 1.17 (H) 06/30/2017 02:00 PM    BUN 20 06/30/2017 02:00 PM    Sodium 142 06/30/2017 02:00 PM    Potassium 3.9 06/30/2017 02:00 PM    Chloride 100 06/30/2017 02:00 PM    CO2 24 06/30/2017 02:00 PM          Lab Results   Component Value Date/Time    Hemoglobin A1c 6.9 (H) 06/30/2017 02:00 PM    Hemoglobin A1c 10.8 (H) 10/25/2016 11:11 AM    Hemoglobin A1c 6.2 (H) 09/02/2015 10:01 AM    Microalb/Creat ratio (ug/mg creat.) 19.2 01/31/2018 11:38 AM    LDL, calculated 100 (H) 06/30/2017 02:00 PM    Creatinine 1.17 (H) 06/30/2017 02:00 PM          Assessment/Plan:       1. Type 2 Diabetes Mellitus with neuropathy,nephropathy  Lab Results   Component Value Date/Time    Hemoglobin A1c 6.9 (H) 06/30/2017 02:00 PM    Hemoglobin A1c (POC) 6.3 05/24/2018 11:07 AM     Controlled   A1c could be falsely low due to the recent blood transfusion  lantus 22 units   She will continue metformin 500 mg BID   Victoza  1.8 mg daily . SMBG 3 x day  FLU annually ,Pneumovax ,aspirin daily,annual eye exam,microalbumin. 2.  HTN with microalbumin : Continue present management    3. Hyperlipidemia : Continue statin. 4. Peripheral neuropathy -she has more numbness and weakness  Had PCI ,stent right LE  Metanx did not help         5 PAD -PCI - foot ulcers  March 19th 2016 she had toe right  amputation, followed by wound clinic    6 Hypercalcemia - resolved         Thank you for allowing me to participate in the care of this patient.     Terrie Aceves MD      Patient verbalized understanding

## 2018-05-24 NOTE — MR AVS SNAPSHOT
[address] [facility] [address] 
[phone] Patient: Yadiel Gomes MRN: [id number] HANS:[id number] Visit Information Date & Time Provider Department Dept. Phone Encounter #  
 5/24/2018 10:45 AM Chepe Henson MD [facility] Diabetes & Endocrinology [phone] [id number] Follow-up Instructions Return in about 4 months (around 9/24/2018). Upcoming Health Maintenance Date Due Hepatitis C Screening 1945 DTaP/Tdap/Td series (1 - Tdap) 2/13/1968 BREAST CANCER SCRN MAMMOGRAM 2/13/1997 FOBT Q 1 YEAR AGE 50-75 2/13/1997 ZOSTER VACCINE AGE 60> 12/13/2006 Bone Densitometry (Dexa) Screening 2/13/2012 Pneumococcal 65+ Low/Medium Risk (1 of 2 - PCV13) 2/13/2012 MEDICARE YEARLY EXAM 3/28/2018 LIPID PANEL Q1 6/30/2018 HEMOGLOBIN A1C Q6M 7/31/2018 Influenza Age 5 to Adult 8/1/2018 EYE EXAM RETINAL OR DILATED Q1 8/24/2018 MICROALBUMIN Q1 1/31/2019 FOOT EXAM Q1 4/10/2019 GLAUCOMA SCREENING Q2Y 8/24/2019 Allergies as of 5/24/2018  Review Complete On: 5/24/2018 By: Chepe Henson MD  
 No Known Allergies Current Immunizations  Never Reviewed No immunizations on file. Not reviewed this visit You Were Diagnosed With   
  
 Codes Comments Type 2 diabetes mellitus with hyperglycemia, with long-term current use of insulin (HCC)    -  Primary ICD-10-CM: E11.65, Z79.4 ICD-9-CM: 250.00, 790.29, V58.67 Essential hypertension     ICD-10-CM: I10 
ICD-9-CM: 401.9 Mixed hyperlipidemia     ICD-10-CM: E78.2 ICD-9-CM: 272.2 Vitals BP Pulse Temp Resp Height(growth percentile) SpO2  
 155/68 (BP 1 Location: Left arm, BP Patient Position: Sitting) 97 98.1 °F (36.7 °C) (Oral) 16 5' 3\" (1.6 m) 99% OB Status Smoking Status Menopause Never Smoker Vitals History Preferred Pharmacy Pharmacy Name Phone [facility] [address], [address] [place], [place] [address] AT [address] [phone] Your Updated Medication List  
  
   
This list is accurate as of 5/24/18 11:38 AM.  Always use your most recent med list.  
  
  
  
  
 atorvastatin 20 mg tablet Commonly known as:  LIPITOR  
TAKE 1 TABLET BY MOUTH NIGHTLY Blood-Glucose Meter monitoring kit Commonly known as:  TRUE METRIX AIR GLUCOSE METER Test blood glucose 3 time daily Dx Code: E11.65 Cholecalciferol (Vitamin D3) 2,000 unit Cap capsule Commonly known as:  VITAMIN D3 Take 2,000 Units by mouth daily. cyanocobalamin 1,000 mcg tablet Commonly known as:  VITAMIN B-12 Take 1 Tab by mouth daily. cyclobenzaprine 10 mg tablet Commonly known as:  FLEXERIL Take  by mouth three (3) times daily as needed for Muscle Spasm(s). dilTIAZem  mg ER capsule Commonly known as:  CARTIA XT  
TAKE 1 CAPSULE BY MOUTH DAILY docusate sodium 100 mg capsule Commonly known as:  Joseph Lints Take 1 Cap by mouth two (2) times a day. ferrous sulfate 325 mg (65 mg iron) tablet Take 1 Tab by mouth two (2) times a day.  
  
 gabapentin 300 mg capsule Commonly known as:  NEURONTIN Take 2 Caps by mouth three (3) times daily. glucose blood VI test strips strip Commonly known as:  TRUE METRIX GLUCOSE TEST STRIP Test blood glucose 3 time daily Dx Code: E11.65 HYDROcodone-acetaminophen 5-325 mg per tablet Commonly known as:  Helena Rosed[place] Take  by mouth. insulin glargine 100 unit/mL (3 mL) Inpn Commonly known as:  LANTUS SOLOSTAR U-100 INSULIN  
ADMINISTER 22 UNITS UNDER THE SKIN IN THE MORNING  
  
 * Insulin Needles (Disposable) 32 gauge x 5/32\" Ndle Commonly known as:  [place] Pen Needle USE TO INJECT LANTUS AND VICTOZA EVERY DAILY DX CODE: E11.65 * [place] Pen Needle 32 gauge x 5/32\" Ndle Generic drug:  Insulin Needles (Disposable) USE TO INJECT LANTUS AND VICTOZA EVERY DAY  
  
 L-Mfolate-B6 Phos-Methyl-B12 3-35-2 mg Tab tab Commonly known as:  Jerri Narendraursula Take 1 Tab by mouth two (2) times a day. * Lancets Misc Commonly known as:  Raven Hose Use to test blood glucose 3 times daily as directed. * Lancets Misc Test blood glucose 3 times daily Liraglutide 0.6 mg/0.1 mL (18 mg/3 mL) Pnij Commonly known as:  VICTOZA 3-BANDAR  
1.8 mg by SubCUTAneous route every morning. losartan 50 mg tablet Commonly known as:  COZAAR  
losartan 50 mg tablet METANX (ALGAL OIL) 3 mg-35 mg-2 mg -90.314 mg Cap Generic drug:  levomefolate-B6-meB12-algal oil TAKE 1 CAPSULE BY MOUTH TWICE A DAY. metFORMIN 1,000 mg tablet Commonly known as:  GLUCOPHAGE  
TAKE 1 TABLET BY MOUTH TWICE DAILY WITH MEALS  
  
 oxyCODONE IR 5 mg immediate release tablet Commonly known as:  Brynda Kenning Take 5 mg by mouth every six (6) hours as needed for Pain. oxyCODONE-acetaminophen 5-325 mg per tablet Commonly known as:  PERCOCET TK 1 OR 2 TS PO Q 4 H PRN P / CRAMPS  
  
 polyethylene glycol 17 gram packet Commonly known as:  Artemio Freud Take 1 Packet by mouth daily. valsartan-hydroCHLOROthiazide 320-25 mg per tablet Commonly known as:  DIOVAN-HCT  
TAKE 1 TABLET BY MOUTH EVERY DAY  
  
 vitamin e 1,000 unit capsule Commonly known as:  E GEMS Take 1,000 Units by mouth daily. * Notice: This list has 4 medication(s) that are the same as other medications prescribed for you. Read the directions carefully, and ask your doctor or other care provider to review them with you. We Performed the Following AMB POC GLUCOSE, QUANTITATIVE, BLOOD [21936 CPT(R)] AMB POC HEMOGLOBIN A1C [75617 CPT(R)] Follow-up Instructions Return in about 4 months (around 9/24/2018). Introducing [facility]!    
 Manish Gongora introduces [employer] patient portal. Now you can access parts of your medical record, email your doctor's office, and request medication refills online. 1. In your internet browser, go to [email or web address] 2. Click on the First Time User? Click Here link in the Sign In box. You will see the New Member Sign Up page. 3. Enter your CloudSafe Access Code exactly as it appears below. You will not need to use this code after youve completed the sign-up process. If you do not sign up before the expiration date, you must request a new code. · CloudSafe Access Code: [email or web address] Expires: 8/22/2018 11:38 AM 
 
4. Enter the last four digits of your Social Security Number (xxxx) and Date of Birth (mm/dd/yyyy) as indicated and click Submit. You will be taken to the next sign-up page. 5. Create a CloudSafe ID. This will be your CloudSafe login ID and cannot be changed, so think of one that is secure and easy to remember. 6. Create a CloudSafe password. You can change your password at any time. 7. Enter your Password Reset Question and Answer. This can be used at a later time if you forget your password. 8. Enter your e-mail address. You will receive e-mail notification when new information is available in [address] 9. Click Sign Up. You can now view and download portions of your medical record. 10. Click the Download Summary menu link to download a portable copy of your medical information. If you have questions, please visit the Frequently Asked Questions section of the CloudSafe website. Remember, CloudSafe is NOT to be used for urgent needs. For medical emergencies, dial 911. Now available from your iPhone and Android! Please provide this summary of care documentation to your next provider. Your primary care clinician is listed as Jatinder Sanford. If you have any questions after today's visit, please call [phone].

## 2018-05-24 NOTE — PROGRESS NOTES
Fior Lee is a 72 y.o. female here for   Chief Complaint   Patient presents with    Diabetes    Cholesterol Problem    Hypertension       Functional glucose monitor and record keeping system? - yes  Eye exam within last year? - on file  Foot exam within last year? - on file    1. Have you been to the ER, urgent care clinic since your last visit? Hospitalized since your last visit? -Victoria Casey 3 weeks ago for blood transfusion     2. Have you seen or consulted any other health care providers outside of the [facility] since your last visit? Include any pap smears or colon screening. -PCP

## 2018-06-28 DIAGNOSIS — I10 ESSENTIAL HYPERTENSION: ICD-10-CM

## 2018-06-28 RX ORDER — VALSARTAN AND HYDROCHLOROTHIAZIDE 320; 25 MG/1; MG/1
TABLET, FILM COATED ORAL
Qty: 90 TAB | Refills: 11 | Status: SHIPPED | OUTPATIENT
Start: 2018-06-28 | End: 2019-04-08

## 2018-07-26 RX ORDER — DILTIAZEM HYDROCHLORIDE 180 MG/1
CAPSULE, EXTENDED RELEASE ORAL
Qty: 90 CAP | Refills: 11 | Status: SHIPPED | OUTPATIENT
Start: 2018-07-26 | End: 2019-09-27 | Stop reason: SDUPTHER

## 2018-08-07 ENCOUNTER — DOCUMENTATION ONLY (OUTPATIENT)
Dept: ENDOCRINOLOGY | Age: 73
End: 2018-08-07

## 2018-08-31 ENCOUNTER — TELEPHONE (OUTPATIENT)
Dept: ENDOCRINOLOGY | Age: 73
End: 2018-08-31

## 2018-08-31 DIAGNOSIS — E53.8 VITAMIN B12 DEFICIENCY: ICD-10-CM

## 2018-08-31 DIAGNOSIS — Z79.4 TYPE 2 DIABETES MELLITUS WITH HYPERGLYCEMIA, WITH LONG-TERM CURRENT USE OF INSULIN (HCC): ICD-10-CM

## 2018-08-31 DIAGNOSIS — E11.65 UNCONTROLLED TYPE 2 DIABETES MELLITUS WITH HYPERGLYCEMIA, WITH LONG-TERM CURRENT USE OF INSULIN (HCC): ICD-10-CM

## 2018-08-31 DIAGNOSIS — E78.2 MIXED HYPERLIPIDEMIA: ICD-10-CM

## 2018-08-31 DIAGNOSIS — E83.52 HYPERCALCEMIA: ICD-10-CM

## 2018-08-31 DIAGNOSIS — E11.65 TYPE 2 DIABETES MELLITUS WITH HYPERGLYCEMIA, WITH LONG-TERM CURRENT USE OF INSULIN (HCC): ICD-10-CM

## 2018-08-31 DIAGNOSIS — E55.9 VITAMIN D DEFICIENCY: ICD-10-CM

## 2018-08-31 DIAGNOSIS — I73.9 PAD (PERIPHERAL ARTERY DISEASE) (HCC): ICD-10-CM

## 2018-08-31 DIAGNOSIS — E78.5 HYPERLIPIDEMIA, UNSPECIFIED HYPERLIPIDEMIA TYPE: ICD-10-CM

## 2018-08-31 DIAGNOSIS — I10 ESSENTIAL HYPERTENSION: ICD-10-CM

## 2018-08-31 DIAGNOSIS — G62.9 NEUROPATHY: ICD-10-CM

## 2018-08-31 DIAGNOSIS — E11.49 TYPE II OR UNSPECIFIED TYPE DIABETES MELLITUS WITH NEUROLOGICAL MANIFESTATIONS, UNCONTROLLED(250.62) (HCC): ICD-10-CM

## 2018-08-31 DIAGNOSIS — Z79.4 UNCONTROLLED TYPE 2 DIABETES MELLITUS WITH HYPERGLYCEMIA, WITH LONG-TERM CURRENT USE OF INSULIN (HCC): ICD-10-CM

## 2018-08-31 RX ORDER — INSULIN PUMP SYRINGE, 3 ML
EACH MISCELLANEOUS
Qty: 1 KIT | Refills: 0 | Status: SHIPPED | OUTPATIENT
Start: 2018-08-31 | End: 2021-01-01 | Stop reason: CLARIF

## 2018-08-31 RX ORDER — LANCETS
EACH MISCELLANEOUS
Qty: 100 EACH | Refills: 11 | Status: SHIPPED | OUTPATIENT
Start: 2018-08-31 | End: 2018-08-31 | Stop reason: SDUPTHER

## 2018-08-31 NOTE — TELEPHONE ENCOUNTER
----- Message from Erica Webster sent at 8/31/2018  2:28 PM EDT -----  Regarding: [place]/telephone  Pts daughter Chepe Singer is requesting a new meter for her mother. Ms Jarocho Lomeli number is [phone] and Selma Rohini number is [phone]. She is requesting to know if there is a new meter one that she puts it on her arm.

## 2018-09-03 RX ORDER — LANCETS 33 GAUGE
EACH MISCELLANEOUS
Qty: 300 LANCET | Refills: 11 | Status: SHIPPED | OUTPATIENT
Start: 2018-09-03 | End: 2021-06-18 | Stop reason: SDUPTHER

## 2018-09-05 RX ORDER — LIRAGLUTIDE 6 MG/ML
INJECTION SUBCUTANEOUS
Qty: 9 ML | Refills: 0 | Status: SHIPPED | OUTPATIENT
Start: 2018-09-05 | End: 2018-09-25 | Stop reason: SDUPTHER

## 2018-09-25 RX ORDER — LIRAGLUTIDE 6 MG/ML
INJECTION SUBCUTANEOUS
Qty: 9 ML | Refills: 0 | Status: SHIPPED | OUTPATIENT
Start: 2018-09-25 | End: 2018-11-15 | Stop reason: SDUPTHER

## 2018-10-25 DIAGNOSIS — Z79.4 TYPE 2 DIABETES MELLITUS WITH HYPERGLYCEMIA, WITH LONG-TERM CURRENT USE OF INSULIN (HCC): ICD-10-CM

## 2018-10-25 DIAGNOSIS — E53.8 VITAMIN B12 DEFICIENCY: ICD-10-CM

## 2018-10-25 DIAGNOSIS — E11.65 TYPE 2 DIABETES MELLITUS WITH HYPERGLYCEMIA, WITH LONG-TERM CURRENT USE OF INSULIN (HCC): ICD-10-CM

## 2018-10-25 DIAGNOSIS — E11.49 TYPE II OR UNSPECIFIED TYPE DIABETES MELLITUS WITH NEUROLOGICAL MANIFESTATIONS, UNCONTROLLED(250.62) (HCC): ICD-10-CM

## 2018-10-25 DIAGNOSIS — I10 ESSENTIAL HYPERTENSION: ICD-10-CM

## 2018-10-25 DIAGNOSIS — Z79.4 UNCONTROLLED TYPE 2 DIABETES MELLITUS WITH HYPERGLYCEMIA, WITH LONG-TERM CURRENT USE OF INSULIN (HCC): ICD-10-CM

## 2018-10-25 DIAGNOSIS — E83.52 HYPERCALCEMIA: ICD-10-CM

## 2018-10-25 DIAGNOSIS — E78.2 MIXED HYPERLIPIDEMIA: ICD-10-CM

## 2018-10-25 DIAGNOSIS — E11.65 UNCONTROLLED TYPE 2 DIABETES MELLITUS WITH HYPERGLYCEMIA, WITH LONG-TERM CURRENT USE OF INSULIN (HCC): ICD-10-CM

## 2018-10-25 DIAGNOSIS — G62.9 NEUROPATHY: ICD-10-CM

## 2018-10-25 DIAGNOSIS — I73.9 PAD (PERIPHERAL ARTERY DISEASE) (HCC): ICD-10-CM

## 2018-10-25 DIAGNOSIS — E55.9 VITAMIN D DEFICIENCY: ICD-10-CM

## 2018-10-25 DIAGNOSIS — E78.5 HYPERLIPIDEMIA, UNSPECIFIED HYPERLIPIDEMIA TYPE: ICD-10-CM

## 2018-10-25 RX ORDER — GABAPENTIN 300 MG/1
CAPSULE ORAL
Qty: 180 CAP | Refills: 0 | Status: SHIPPED | OUTPATIENT
Start: 2018-10-25 | End: 2018-11-26 | Stop reason: SDUPTHER

## 2018-11-15 RX ORDER — LIRAGLUTIDE 6 MG/ML
INJECTION SUBCUTANEOUS
Qty: 9 ML | Refills: 0 | Status: SHIPPED | OUTPATIENT
Start: 2018-11-15 | End: 2019-09-02 | Stop reason: SDUPTHER

## 2018-11-26 DIAGNOSIS — E78.5 HYPERLIPIDEMIA, UNSPECIFIED HYPERLIPIDEMIA TYPE: ICD-10-CM

## 2018-11-26 DIAGNOSIS — E53.8 VITAMIN B12 DEFICIENCY: ICD-10-CM

## 2018-11-26 DIAGNOSIS — E11.65 TYPE 2 DIABETES MELLITUS WITH HYPERGLYCEMIA, WITH LONG-TERM CURRENT USE OF INSULIN (HCC): ICD-10-CM

## 2018-11-26 DIAGNOSIS — G62.9 NEUROPATHY: ICD-10-CM

## 2018-11-26 DIAGNOSIS — I10 ESSENTIAL HYPERTENSION: ICD-10-CM

## 2018-11-26 DIAGNOSIS — E83.52 HYPERCALCEMIA: ICD-10-CM

## 2018-11-26 DIAGNOSIS — Z79.4 TYPE 2 DIABETES MELLITUS WITH HYPERGLYCEMIA, WITH LONG-TERM CURRENT USE OF INSULIN (HCC): ICD-10-CM

## 2018-11-26 DIAGNOSIS — E55.9 VITAMIN D DEFICIENCY: ICD-10-CM

## 2018-11-26 DIAGNOSIS — I73.9 PAD (PERIPHERAL ARTERY DISEASE) (HCC): ICD-10-CM

## 2018-11-26 DIAGNOSIS — Z79.4 UNCONTROLLED TYPE 2 DIABETES MELLITUS WITH HYPERGLYCEMIA, WITH LONG-TERM CURRENT USE OF INSULIN (HCC): ICD-10-CM

## 2018-11-26 DIAGNOSIS — E11.65 UNCONTROLLED TYPE 2 DIABETES MELLITUS WITH HYPERGLYCEMIA, WITH LONG-TERM CURRENT USE OF INSULIN (HCC): ICD-10-CM

## 2018-11-26 DIAGNOSIS — E11.49 TYPE II OR UNSPECIFIED TYPE DIABETES MELLITUS WITH NEUROLOGICAL MANIFESTATIONS, UNCONTROLLED(250.62) (HCC): ICD-10-CM

## 2018-11-26 DIAGNOSIS — E78.2 MIXED HYPERLIPIDEMIA: ICD-10-CM

## 2018-11-26 RX ORDER — GABAPENTIN 300 MG/1
CAPSULE ORAL
Qty: 180 CAP | Refills: 0 | Status: SHIPPED | OUTPATIENT
Start: 2018-11-26 | End: 2019-01-02 | Stop reason: SDUPTHER

## 2019-01-02 DIAGNOSIS — E53.8 VITAMIN B12 DEFICIENCY: ICD-10-CM

## 2019-01-02 DIAGNOSIS — E11.65 TYPE 2 DIABETES MELLITUS WITH HYPERGLYCEMIA, WITH LONG-TERM CURRENT USE OF INSULIN (HCC): ICD-10-CM

## 2019-01-02 DIAGNOSIS — E78.5 HYPERLIPIDEMIA, UNSPECIFIED HYPERLIPIDEMIA TYPE: ICD-10-CM

## 2019-01-02 DIAGNOSIS — E11.49 TYPE II OR UNSPECIFIED TYPE DIABETES MELLITUS WITH NEUROLOGICAL MANIFESTATIONS, UNCONTROLLED(250.62) (HCC): ICD-10-CM

## 2019-01-02 DIAGNOSIS — E78.2 MIXED HYPERLIPIDEMIA: ICD-10-CM

## 2019-01-02 DIAGNOSIS — Z79.4 UNCONTROLLED TYPE 2 DIABETES MELLITUS WITH HYPERGLYCEMIA, WITH LONG-TERM CURRENT USE OF INSULIN (HCC): ICD-10-CM

## 2019-01-02 DIAGNOSIS — Z79.4 TYPE 2 DIABETES MELLITUS WITH HYPERGLYCEMIA, WITH LONG-TERM CURRENT USE OF INSULIN (HCC): ICD-10-CM

## 2019-01-02 DIAGNOSIS — E55.9 VITAMIN D DEFICIENCY: ICD-10-CM

## 2019-01-02 DIAGNOSIS — I73.9 PAD (PERIPHERAL ARTERY DISEASE) (HCC): ICD-10-CM

## 2019-01-02 DIAGNOSIS — E83.52 HYPERCALCEMIA: ICD-10-CM

## 2019-01-02 DIAGNOSIS — E11.65 UNCONTROLLED TYPE 2 DIABETES MELLITUS WITH HYPERGLYCEMIA, WITH LONG-TERM CURRENT USE OF INSULIN (HCC): ICD-10-CM

## 2019-01-02 DIAGNOSIS — I10 ESSENTIAL HYPERTENSION: ICD-10-CM

## 2019-01-02 DIAGNOSIS — G62.9 NEUROPATHY: ICD-10-CM

## 2019-01-02 RX ORDER — METFORMIN HYDROCHLORIDE 1000 MG/1
TABLET ORAL
Qty: 60 TAB | Refills: 0 | Status: SHIPPED | OUTPATIENT
Start: 2019-01-02 | End: 2019-01-02 | Stop reason: SDUPTHER

## 2019-01-02 RX ORDER — GABAPENTIN 300 MG/1
CAPSULE ORAL
Qty: 180 CAP | Refills: 0 | Status: SHIPPED | OUTPATIENT
Start: 2019-01-02 | End: 2019-02-04 | Stop reason: SDUPTHER

## 2019-01-02 RX ORDER — METFORMIN HYDROCHLORIDE 1000 MG/1
TABLET ORAL
Qty: 180 TAB | Refills: 0 | Status: SHIPPED | OUTPATIENT
Start: 2019-01-02 | End: 2019-04-05 | Stop reason: SDUPTHER

## 2019-01-14 DIAGNOSIS — E11.65 UNCONTROLLED TYPE 2 DIABETES MELLITUS WITH HYPERGLYCEMIA, WITH LONG-TERM CURRENT USE OF INSULIN (HCC): ICD-10-CM

## 2019-01-14 DIAGNOSIS — Z79.4 TYPE 2 DIABETES MELLITUS WITH HYPERGLYCEMIA, WITH LONG-TERM CURRENT USE OF INSULIN (HCC): ICD-10-CM

## 2019-01-14 DIAGNOSIS — I10 ESSENTIAL HYPERTENSION: ICD-10-CM

## 2019-01-14 DIAGNOSIS — E83.52 HYPERCALCEMIA: ICD-10-CM

## 2019-01-14 DIAGNOSIS — E78.2 MIXED HYPERLIPIDEMIA: ICD-10-CM

## 2019-01-14 DIAGNOSIS — E53.8 VITAMIN B12 DEFICIENCY: ICD-10-CM

## 2019-01-14 DIAGNOSIS — E78.5 HYPERLIPIDEMIA, UNSPECIFIED HYPERLIPIDEMIA TYPE: ICD-10-CM

## 2019-01-14 DIAGNOSIS — Z79.4 UNCONTROLLED TYPE 2 DIABETES MELLITUS WITH HYPERGLYCEMIA, WITH LONG-TERM CURRENT USE OF INSULIN (HCC): ICD-10-CM

## 2019-01-14 DIAGNOSIS — I73.9 PAD (PERIPHERAL ARTERY DISEASE) (HCC): ICD-10-CM

## 2019-01-14 DIAGNOSIS — G62.9 NEUROPATHY: ICD-10-CM

## 2019-01-14 DIAGNOSIS — E11.65 TYPE 2 DIABETES MELLITUS WITH HYPERGLYCEMIA, WITH LONG-TERM CURRENT USE OF INSULIN (HCC): ICD-10-CM

## 2019-01-14 DIAGNOSIS — E55.9 VITAMIN D DEFICIENCY: ICD-10-CM

## 2019-01-14 DIAGNOSIS — E11.49 TYPE II OR UNSPECIFIED TYPE DIABETES MELLITUS WITH NEUROLOGICAL MANIFESTATIONS, UNCONTROLLED(250.62) (HCC): ICD-10-CM

## 2019-01-14 RX ORDER — LIRAGLUTIDE 6 MG/ML
INJECTION SUBCUTANEOUS
Qty: 9 ML | Refills: 0 | Status: SHIPPED | OUTPATIENT
Start: 2019-01-14 | End: 2019-02-16 | Stop reason: SDUPTHER

## 2019-02-04 DIAGNOSIS — I10 ESSENTIAL HYPERTENSION: ICD-10-CM

## 2019-02-04 DIAGNOSIS — I73.9 PAD (PERIPHERAL ARTERY DISEASE) (HCC): ICD-10-CM

## 2019-02-04 DIAGNOSIS — Z79.4 TYPE 2 DIABETES MELLITUS WITH HYPERGLYCEMIA, WITH LONG-TERM CURRENT USE OF INSULIN (HCC): ICD-10-CM

## 2019-02-04 DIAGNOSIS — E11.65 TYPE 2 DIABETES MELLITUS WITH HYPERGLYCEMIA, WITH LONG-TERM CURRENT USE OF INSULIN (HCC): ICD-10-CM

## 2019-02-04 DIAGNOSIS — E53.8 VITAMIN B12 DEFICIENCY: ICD-10-CM

## 2019-02-04 DIAGNOSIS — Z79.4 UNCONTROLLED TYPE 2 DIABETES MELLITUS WITH HYPERGLYCEMIA, WITH LONG-TERM CURRENT USE OF INSULIN (HCC): ICD-10-CM

## 2019-02-04 DIAGNOSIS — G62.9 NEUROPATHY: ICD-10-CM

## 2019-02-04 DIAGNOSIS — E11.49 TYPE II OR UNSPECIFIED TYPE DIABETES MELLITUS WITH NEUROLOGICAL MANIFESTATIONS, UNCONTROLLED(250.62) (HCC): ICD-10-CM

## 2019-02-04 DIAGNOSIS — E11.65 UNCONTROLLED TYPE 2 DIABETES MELLITUS WITH HYPERGLYCEMIA, WITH LONG-TERM CURRENT USE OF INSULIN (HCC): ICD-10-CM

## 2019-02-04 DIAGNOSIS — E83.52 HYPERCALCEMIA: ICD-10-CM

## 2019-02-04 DIAGNOSIS — E55.9 VITAMIN D DEFICIENCY: ICD-10-CM

## 2019-02-04 DIAGNOSIS — E78.5 HYPERLIPIDEMIA, UNSPECIFIED HYPERLIPIDEMIA TYPE: ICD-10-CM

## 2019-02-04 DIAGNOSIS — E78.2 MIXED HYPERLIPIDEMIA: ICD-10-CM

## 2019-02-04 RX ORDER — GABAPENTIN 300 MG/1
CAPSULE ORAL
Qty: 180 CAP | Refills: 0 | Status: SHIPPED | OUTPATIENT
Start: 2019-02-04 | End: 2019-03-04 | Stop reason: SDUPTHER

## 2019-02-05 DIAGNOSIS — I73.9 PAD (PERIPHERAL ARTERY DISEASE) (HCC): ICD-10-CM

## 2019-02-05 DIAGNOSIS — E11.65 UNCONTROLLED TYPE 2 DIABETES MELLITUS WITH HYPERGLYCEMIA, WITH LONG-TERM CURRENT USE OF INSULIN (HCC): ICD-10-CM

## 2019-02-05 DIAGNOSIS — Z79.4 TYPE 2 DIABETES MELLITUS WITH HYPERGLYCEMIA, WITH LONG-TERM CURRENT USE OF INSULIN (HCC): ICD-10-CM

## 2019-02-05 DIAGNOSIS — G62.9 NEUROPATHY: ICD-10-CM

## 2019-02-05 DIAGNOSIS — E83.52 HYPERCALCEMIA: ICD-10-CM

## 2019-02-05 DIAGNOSIS — E53.8 VITAMIN B12 DEFICIENCY: ICD-10-CM

## 2019-02-05 DIAGNOSIS — I10 ESSENTIAL HYPERTENSION: ICD-10-CM

## 2019-02-05 DIAGNOSIS — Z79.4 UNCONTROLLED TYPE 2 DIABETES MELLITUS WITH HYPERGLYCEMIA, WITH LONG-TERM CURRENT USE OF INSULIN (HCC): ICD-10-CM

## 2019-02-05 DIAGNOSIS — E11.65 TYPE 2 DIABETES MELLITUS WITH HYPERGLYCEMIA, WITH LONG-TERM CURRENT USE OF INSULIN (HCC): ICD-10-CM

## 2019-02-05 DIAGNOSIS — E78.5 HYPERLIPIDEMIA, UNSPECIFIED HYPERLIPIDEMIA TYPE: ICD-10-CM

## 2019-02-05 DIAGNOSIS — E11.49 TYPE II OR UNSPECIFIED TYPE DIABETES MELLITUS WITH NEUROLOGICAL MANIFESTATIONS, UNCONTROLLED(250.62) (HCC): ICD-10-CM

## 2019-02-05 DIAGNOSIS — E78.2 MIXED HYPERLIPIDEMIA: ICD-10-CM

## 2019-02-05 DIAGNOSIS — E55.9 VITAMIN D DEFICIENCY: ICD-10-CM

## 2019-02-05 RX ORDER — PEN NEEDLE, DIABETIC 31 GX3/16"
NEEDLE, DISPOSABLE MISCELLANEOUS
Qty: 200 PEN NEEDLE | Refills: 11 | Status: SHIPPED | OUTPATIENT
Start: 2019-02-05 | End: 2020-03-30

## 2019-02-16 DIAGNOSIS — G62.9 NEUROPATHY: ICD-10-CM

## 2019-02-16 DIAGNOSIS — E53.8 VITAMIN B12 DEFICIENCY: ICD-10-CM

## 2019-02-16 DIAGNOSIS — I73.9 PAD (PERIPHERAL ARTERY DISEASE) (HCC): ICD-10-CM

## 2019-02-16 DIAGNOSIS — E11.49 TYPE II OR UNSPECIFIED TYPE DIABETES MELLITUS WITH NEUROLOGICAL MANIFESTATIONS, UNCONTROLLED(250.62) (HCC): ICD-10-CM

## 2019-02-16 DIAGNOSIS — Z79.4 TYPE 2 DIABETES MELLITUS WITH HYPERGLYCEMIA, WITH LONG-TERM CURRENT USE OF INSULIN (HCC): ICD-10-CM

## 2019-02-16 DIAGNOSIS — E78.5 HYPERLIPIDEMIA, UNSPECIFIED HYPERLIPIDEMIA TYPE: ICD-10-CM

## 2019-02-16 DIAGNOSIS — E11.65 TYPE 2 DIABETES MELLITUS WITH HYPERGLYCEMIA, WITH LONG-TERM CURRENT USE OF INSULIN (HCC): ICD-10-CM

## 2019-02-16 DIAGNOSIS — E78.2 MIXED HYPERLIPIDEMIA: ICD-10-CM

## 2019-02-16 DIAGNOSIS — E11.65 UNCONTROLLED TYPE 2 DIABETES MELLITUS WITH HYPERGLYCEMIA, WITH LONG-TERM CURRENT USE OF INSULIN (HCC): ICD-10-CM

## 2019-02-16 DIAGNOSIS — I10 ESSENTIAL HYPERTENSION: ICD-10-CM

## 2019-02-16 DIAGNOSIS — E83.52 HYPERCALCEMIA: ICD-10-CM

## 2019-02-16 DIAGNOSIS — E55.9 VITAMIN D DEFICIENCY: ICD-10-CM

## 2019-02-16 DIAGNOSIS — Z79.4 UNCONTROLLED TYPE 2 DIABETES MELLITUS WITH HYPERGLYCEMIA, WITH LONG-TERM CURRENT USE OF INSULIN (HCC): ICD-10-CM

## 2019-02-16 RX ORDER — LIRAGLUTIDE 6 MG/ML
INJECTION SUBCUTANEOUS
Qty: 9 ML | Refills: 0 | Status: SHIPPED | OUTPATIENT
Start: 2019-02-16 | End: 2019-03-25 | Stop reason: SDUPTHER

## 2019-03-04 DIAGNOSIS — E78.5 HYPERLIPIDEMIA, UNSPECIFIED HYPERLIPIDEMIA TYPE: ICD-10-CM

## 2019-03-04 DIAGNOSIS — E11.49 TYPE II OR UNSPECIFIED TYPE DIABETES MELLITUS WITH NEUROLOGICAL MANIFESTATIONS, UNCONTROLLED(250.62) (HCC): ICD-10-CM

## 2019-03-04 DIAGNOSIS — E83.52 HYPERCALCEMIA: ICD-10-CM

## 2019-03-04 DIAGNOSIS — I73.9 PAD (PERIPHERAL ARTERY DISEASE) (HCC): ICD-10-CM

## 2019-03-04 DIAGNOSIS — G62.9 NEUROPATHY: ICD-10-CM

## 2019-03-04 DIAGNOSIS — E11.65 TYPE 2 DIABETES MELLITUS WITH HYPERGLYCEMIA, WITH LONG-TERM CURRENT USE OF INSULIN (HCC): ICD-10-CM

## 2019-03-04 DIAGNOSIS — Z79.4 UNCONTROLLED TYPE 2 DIABETES MELLITUS WITH HYPERGLYCEMIA, WITH LONG-TERM CURRENT USE OF INSULIN (HCC): ICD-10-CM

## 2019-03-04 DIAGNOSIS — E11.65 UNCONTROLLED TYPE 2 DIABETES MELLITUS WITH HYPERGLYCEMIA, WITH LONG-TERM CURRENT USE OF INSULIN (HCC): ICD-10-CM

## 2019-03-04 DIAGNOSIS — I10 ESSENTIAL HYPERTENSION: ICD-10-CM

## 2019-03-04 DIAGNOSIS — Z79.4 TYPE 2 DIABETES MELLITUS WITH HYPERGLYCEMIA, WITH LONG-TERM CURRENT USE OF INSULIN (HCC): ICD-10-CM

## 2019-03-04 DIAGNOSIS — E78.2 MIXED HYPERLIPIDEMIA: ICD-10-CM

## 2019-03-04 DIAGNOSIS — E55.9 VITAMIN D DEFICIENCY: ICD-10-CM

## 2019-03-04 DIAGNOSIS — E53.8 VITAMIN B12 DEFICIENCY: ICD-10-CM

## 2019-03-04 RX ORDER — GABAPENTIN 300 MG/1
CAPSULE ORAL
Qty: 180 CAP | Refills: 0 | Status: SHIPPED | OUTPATIENT
Start: 2019-03-04 | End: 2019-05-31 | Stop reason: SDUPTHER

## 2019-03-25 DIAGNOSIS — E78.2 MIXED HYPERLIPIDEMIA: ICD-10-CM

## 2019-03-25 DIAGNOSIS — E11.49 TYPE II OR UNSPECIFIED TYPE DIABETES MELLITUS WITH NEUROLOGICAL MANIFESTATIONS, UNCONTROLLED(250.62) (HCC): ICD-10-CM

## 2019-03-25 DIAGNOSIS — I10 ESSENTIAL HYPERTENSION: ICD-10-CM

## 2019-03-25 DIAGNOSIS — E83.52 HYPERCALCEMIA: ICD-10-CM

## 2019-03-25 DIAGNOSIS — Z79.4 TYPE 2 DIABETES MELLITUS WITH HYPERGLYCEMIA, WITH LONG-TERM CURRENT USE OF INSULIN (HCC): ICD-10-CM

## 2019-03-25 DIAGNOSIS — E11.65 TYPE 2 DIABETES MELLITUS WITH HYPERGLYCEMIA, WITH LONG-TERM CURRENT USE OF INSULIN (HCC): ICD-10-CM

## 2019-03-25 DIAGNOSIS — E78.5 HYPERLIPIDEMIA, UNSPECIFIED HYPERLIPIDEMIA TYPE: ICD-10-CM

## 2019-03-25 DIAGNOSIS — G62.9 NEUROPATHY: ICD-10-CM

## 2019-03-25 DIAGNOSIS — E11.65 UNCONTROLLED TYPE 2 DIABETES MELLITUS WITH HYPERGLYCEMIA, WITH LONG-TERM CURRENT USE OF INSULIN (HCC): ICD-10-CM

## 2019-03-25 DIAGNOSIS — I73.9 PAD (PERIPHERAL ARTERY DISEASE) (HCC): ICD-10-CM

## 2019-03-25 DIAGNOSIS — E55.9 VITAMIN D DEFICIENCY: ICD-10-CM

## 2019-03-25 DIAGNOSIS — E53.8 VITAMIN B12 DEFICIENCY: ICD-10-CM

## 2019-03-25 DIAGNOSIS — Z79.4 UNCONTROLLED TYPE 2 DIABETES MELLITUS WITH HYPERGLYCEMIA, WITH LONG-TERM CURRENT USE OF INSULIN (HCC): ICD-10-CM

## 2019-03-25 RX ORDER — LIRAGLUTIDE 6 MG/ML
INJECTION SUBCUTANEOUS
Qty: 9 ML | Refills: 0 | Status: SHIPPED | OUTPATIENT
Start: 2019-03-25 | End: 2019-04-28 | Stop reason: SDUPTHER

## 2019-04-03 DIAGNOSIS — E78.2 MIXED HYPERLIPIDEMIA: ICD-10-CM

## 2019-04-03 DIAGNOSIS — E11.65 TYPE 2 DIABETES MELLITUS WITH HYPERGLYCEMIA, WITH LONG-TERM CURRENT USE OF INSULIN (HCC): ICD-10-CM

## 2019-04-03 DIAGNOSIS — Z79.4 TYPE 2 DIABETES MELLITUS WITH HYPERGLYCEMIA, WITH LONG-TERM CURRENT USE OF INSULIN (HCC): ICD-10-CM

## 2019-04-03 DIAGNOSIS — I73.9 PAD (PERIPHERAL ARTERY DISEASE) (HCC): ICD-10-CM

## 2019-04-03 DIAGNOSIS — I10 ESSENTIAL HYPERTENSION: ICD-10-CM

## 2019-04-03 RX ORDER — INSULIN GLARGINE 100 [IU]/ML
INJECTION, SOLUTION SUBCUTANEOUS
Qty: 15 ML | Refills: 6 | Status: SHIPPED | OUTPATIENT
Start: 2019-04-03 | End: 2020-04-15

## 2019-04-04 PROBLEM — E11.42 DIABETIC PERIPHERAL NEUROPATHY (HCC): Status: ACTIVE | Noted: 2019-04-04

## 2019-04-04 PROBLEM — M48.02 SPINAL STENOSIS IN CERVICAL REGION: Status: ACTIVE | Noted: 2019-04-04

## 2019-04-04 PROBLEM — M86.179 ACUTE OSTEOMYELITIS OF ANKLE OR FOOT (HCC): Status: ACTIVE | Noted: 2019-04-04

## 2019-04-05 DIAGNOSIS — E78.5 HYPERLIPIDEMIA, UNSPECIFIED HYPERLIPIDEMIA TYPE: ICD-10-CM

## 2019-04-05 DIAGNOSIS — E11.49 TYPE II OR UNSPECIFIED TYPE DIABETES MELLITUS WITH NEUROLOGICAL MANIFESTATIONS, UNCONTROLLED(250.62) (HCC): ICD-10-CM

## 2019-04-05 DIAGNOSIS — E55.9 VITAMIN D DEFICIENCY: ICD-10-CM

## 2019-04-05 DIAGNOSIS — Z79.4 TYPE 2 DIABETES MELLITUS WITH HYPERGLYCEMIA, WITH LONG-TERM CURRENT USE OF INSULIN (HCC): ICD-10-CM

## 2019-04-05 DIAGNOSIS — E78.2 MIXED HYPERLIPIDEMIA: ICD-10-CM

## 2019-04-05 DIAGNOSIS — G62.9 NEUROPATHY: ICD-10-CM

## 2019-04-05 DIAGNOSIS — I10 ESSENTIAL HYPERTENSION: ICD-10-CM

## 2019-04-05 DIAGNOSIS — E83.52 HYPERCALCEMIA: ICD-10-CM

## 2019-04-05 DIAGNOSIS — Z79.4 UNCONTROLLED TYPE 2 DIABETES MELLITUS WITH HYPERGLYCEMIA, WITH LONG-TERM CURRENT USE OF INSULIN (HCC): ICD-10-CM

## 2019-04-05 DIAGNOSIS — E53.8 VITAMIN B12 DEFICIENCY: ICD-10-CM

## 2019-04-05 DIAGNOSIS — E11.65 TYPE 2 DIABETES MELLITUS WITH HYPERGLYCEMIA, WITH LONG-TERM CURRENT USE OF INSULIN (HCC): ICD-10-CM

## 2019-04-05 DIAGNOSIS — I73.9 PAD (PERIPHERAL ARTERY DISEASE) (HCC): ICD-10-CM

## 2019-04-05 DIAGNOSIS — E11.65 UNCONTROLLED TYPE 2 DIABETES MELLITUS WITH HYPERGLYCEMIA, WITH LONG-TERM CURRENT USE OF INSULIN (HCC): ICD-10-CM

## 2019-04-05 RX ORDER — METFORMIN HYDROCHLORIDE 1000 MG/1
TABLET ORAL
Qty: 180 TAB | Refills: 0 | Status: SHIPPED | OUTPATIENT
Start: 2019-04-05 | End: 2019-06-29 | Stop reason: SDUPTHER

## 2019-04-08 ENCOUNTER — OFFICE VISIT (OUTPATIENT)
Dept: ENDOCRINOLOGY | Age: 74
End: 2019-04-08

## 2019-04-08 VITALS
RESPIRATION RATE: 16 BRPM | OXYGEN SATURATION: 98 % | SYSTOLIC BLOOD PRESSURE: 165 MMHG | DIASTOLIC BLOOD PRESSURE: 69 MMHG | HEART RATE: 87 BPM | BODY MASS INDEX: 31.73 KG/M2 | HEIGHT: 63 IN

## 2019-04-08 DIAGNOSIS — E11.21 TYPE 2 DIABETES MELLITUS WITH NEPHROPATHY (HCC): Primary | ICD-10-CM

## 2019-04-08 DIAGNOSIS — I10 ESSENTIAL HYPERTENSION: ICD-10-CM

## 2019-04-08 RX ORDER — CLOPIDOGREL BISULFATE 75 MG/1
75 TABLET ORAL DAILY
COMMUNITY
End: 2022-01-01

## 2019-04-08 NOTE — PROGRESS NOTES
[facility]               Tad Paniagua MD       Perlita Roger  1945        Chief Complaint   Patient presents with    Diabetes    Diabetic Foot Exam       History of Present Illness: Perlita Roger is a 69 y.o. female here for fu  of  Type 2 Diabetes Mellitus. She is on Lantus  Did not bring the meter  No hypoglycemia  Not checking the sugars consistently    Reports pain in the upper extremity, weakness  January 2018: Had cervical fusion    PAD PCI , stenting Seen by Vascular surgeon, at [facility] at [address]  - Dr Fouzia Gutierrez             Type 2 Diabetes was diagnosed 30 yrs ago. End organ effects of diabetes: peripheral neuropathy. Cardiovascular risk factors: dyslipidemia, diabetes mellitus, obesity, sedentary life style, hypertension       Wt Readings from Last 3 Encounters:   10/25/16 179 lb 1.6 oz (81.2 kg)   07/18/16 176 lb (79.8 kg)   04/11/16 170 lb 8 oz (77.3 kg)       BP Readings from Last 3 Encounters:   04/08/19 165/69   05/24/18 155/68   01/31/18 144/62           Past Medical History:   Diagnosis Date    Diabetes mellitus ([facility] [address]     HTN (hypertension)     Hyperlipidemia     Neuropathy     PAD (peripheral artery disease) (HCC)     PCI    Sciatica      Current Outpatient Medications   Medication Sig    LOSARTAN PO Take 1 Tab by mouth daily.  clopidogrel (PLAVIX) 75 mg tab Take 75 mg by mouth daily.     metFORMIN (GLUCOPHAGE) 1,000 mg tablet TAKE 1 TABLET BY MOUTH TWICE DAILY WITH MEALS    insulin glargine (LANTUS SOLOSTAR U-100 INSULIN) 100 unit/mL (3 mL) inpn ADMINISTER 22 UNITS UNDER THE SKIN IN THE MORNING    gabapentin (NEURONTIN) 300 mg capsule TAKE 2 CAPSULES BY MOUTH THREE TIMES DAILY    Insulin Needles, Disposable, (CHELY PEN NEEDLE) 32 gauge x 5/32\" ndle USE TO INJECT LANTUS AND VICTOZA EVERY DAY    VICTOZA 3-BANDAR 0.6 mg/0.1 mL (18 mg/3 mL) pnij ADMINISTER 1.8 MG UNDER THE SKIN DAILY    TRUEPLUS LANCETS 33 gauge misc USE TO TEST BLOOD SUGAR THREE TIMES DAILY    glucose blood VI test strips (TRUE METRIX GLUCOSE TEST STRIP) strip Test blood glucose 3 time daily Dx Code: E11.65    Blood-Glucose Meter (TRUE METRIX AIR GLUCOSE METER) monitoring kit Test blood glucose 3 time daily Dx Code: E11.65    CARTIA  mg ER capsule TAKE 1 CAPSULE BY MOUTH DAILY    CHELY PEN NEEDLE 32 gauge x 5/32\" ndle USE TO INJECT LANTUS AND VICTOZA EVERY DAY    oxyCODONE-acetaminophen (PERCOCET) 5-325 mg per tablet TK 1 OR 2 TS PO Q 4 H PRN P / CRAMPS    ferrous sulfate 325 mg (65 mg iron) tablet Take 1 Tab by mouth two (2) times a day.  dilTIAZem CD (CARTIA XT) 180 mg ER capsule TAKE 1 CAPSULE BY MOUTH DAILY    atorvastatin (LIPITOR) 20 mg tablet TAKE 1 TABLET BY MOUTH NIGHTLY    polyethylene glycol (MIRALAX) 17 gram packet Take 1 Packet by mouth daily.  oxyCODONE IR (ROXICODONE) 5 mg immediate release tablet Take 5 mg by mouth every six (6) hours as needed for Pain.  HYDROcodone-acetaminophen (NORCO) 5-325 mg per tablet Take  by mouth.  VICTOZA 3-BANDAR 0.6 mg/0.1 mL (18 mg/3 mL) pnij ADMINISTER 1.8 MG UNDER THE SKIN EVERY MORNING     No current facility-administered medications for this visit. No Known Allergies      Review of Systems:  - Constitutional Symptoms: no fevers, no chills  - Respiratory: no cough no shortness of breath  - Gastrointestinal: no dysphagia no  abdominal pain  - Musculoskeletal: no joint pains + weakness  - Integumentary: no rashes  - Neurological: + numbness, tingling, no  headaches  -     Physical Examination:   Blood pressure 165/69, pulse 87, resp. rate 16, height 5' 3\" (1.6 m), SpO2 98 %. Estimated body mass index is 31.73 kg/m² as calculated from the following:    Height as of this encounter: 5' 3\" (1.6 m). -   Weight as of 10/25/16: 179 lb 1.6 oz (81.2 kg).   - General: pleasant, no distress, good eye contact  - HEENT: no pallor, no periorbital edema, EOMI  - Neck: supple  - Cardiovascular: regular, normal S1 and S2  - Respiratory: clear to auscultation bilaterally  - Gastrointestinal: soft, nontender, nondistended,  BS +  Ext - toe amputation ,  - Neurological: alert and oriented  - Psychiatric: normal mood and affect  - Skin: color, texture, turgor normal.     Diabetic foot exam: April 2019    Left:    Vibratory sensation absent    Filament test absent sensation with micro filament   Pulse DP: 1+ (weak)    Deformities: Onychomycosis, history of foot ulcers, callus    Right:    Vibratory sensation absent   Filament test absent sensation with micro filament   Pulse DP: 1+ (weak)              Deformities: Great toe amputation      Data Reviewed:     [] Glucose records reviewed. [] See glucose records for details (to be scanned). [x] A1C  [x] Reviewed labs      Lab Results   Component Value Date/Time    Hemoglobin A1c 7.4 (H) 04/03/2019 10:19 AM    Hemoglobin A1c 6.9 (H) 06/30/2017 02:00 PM    Hemoglobin A1c 10.8 (H) 10/25/2016 11:11 AM    Microalb/Creat ratio (ug/mg creat.) 19.2 01/31/2018 11:38 AM    LDL, calculated 104 (H) 04/03/2019 10:19 AM    Creatinine 1.35 (H) 04/03/2019 10:19 AM      Lab Results   Component Value Date/Time    GFR est AA 45 (L) 04/03/2019 10:19 AM    GFR est non-AA 39 (L) 04/03/2019 10:19 AM    Creatinine 1.35 (H) 04/03/2019 10:19 AM    BUN 22 04/03/2019 10:19 AM    Sodium 144 04/03/2019 10:19 AM    Potassium 4.5 04/03/2019 10:19 AM    Chloride 106 04/03/2019 10:19 AM    CO2 23 04/03/2019 10:19 AM          Lab Results   Component Value Date/Time    Hemoglobin A1c 7.4 (H) 04/03/2019 10:19 AM    Hemoglobin A1c 6.9 (H) 06/30/2017 02:00 PM    Hemoglobin A1c 10.8 (H) 10/25/2016 11:11 AM    Microalb/Creat ratio (ug/mg creat.) 19.2 01/31/2018 11:38 AM    LDL, calculated 104 (H) 04/03/2019 10:19 AM    Creatinine 1.35 (H) 04/03/2019 10:19 AM          Assessment/Plan:       1.  Type 2 Diabetes Mellitus with neuropathy,nephropathy  Lab Results   Component Value Date/Time    Hemoglobin A1c 7.4 (H) 04/03/2019 10:19 AM    Hemoglobin A1c (POC) 6.3 05/24/2018 11:07 AM   Controlled  lantus 22 units   She will continue metformin 500 mg BID   Victoza  1.8 mg daily . SMBG 2 x day  FLU annually ,Pneumovax ,aspirin daily,annual eye exam,microalbumin. 2.  HTN with microalbumin : Continue present management    3. Hyperlipidemia : Continue statin. 4. Peripheral neuropathy -Had PCI ,stent right LE  Metanx did not help       5 PAD -PCI - foot ulcers  March 19th 2016 she had toe right  amputation, followed by wound clinic    6 Hypercalcemia - resolved     Cervical radiculopathy: Status post fusion, has persistent pain and weakness  Exercises  To follow-up with neurosurgery    Thank you for allowing me to participate in the care of this patient. Barbi Rasmussen MD      Patient verbalized understanding        Spent more than 40 minutes face-to-face with patient of which greater than 50% of the visit was spent in counseling, importance of the diet, activity, long-term complications and the importance of checking the blood glucose to see the progress.

## 2019-04-08 NOTE — PROGRESS NOTES
Mark De La Fuente is a 69 y.o. female here for   Chief Complaint   Patient presents with    Diabetes    Diabetic Foot Exam       Functional glucose monitor and record keeping system? - yes  Eye exam within last year? - on file  Foot exam within last year? - due    1. Have you been to the ER, urgent care clinic since your last visit? Hospitalized since your last visit? -no    2. Have you seen or consulted any other health care providers outside of the [address] [place] [address] since your last visit? Include any pap smears or colon screening. -PCP

## 2019-04-08 NOTE — LETTER
4/8/19 Patient: Lele Headley YOB: 1945 Date of Visit: 4/8/2019 Lorette Duverney., MD 
[address] [address] VIA Facsimile: [phone] Dear Lorette Duverney., MD, Thank you for referring Ms. Danielle Zaragoza to [address] [place] [address] for evaluation. My notes for this consultation are attached. If you have questions, please do not hesitate to call me. I look forward to following your patient along with you. Sincerely, Sherren Fischer, MD

## 2019-04-09 LAB
ALBUMIN SERPL-MCNC: 3.9 G/DL (ref 3.5–4.8)
ALBUMIN/CREAT UR: 641.5 MG/G CREAT (ref 0–30)
ALBUMIN/GLOB SERPL: 1.2 {RATIO} (ref 1.2–2.2)
ALP SERPL-CCNC: 128 IU/L (ref 39–117)
ALT SERPL-CCNC: 26 IU/L (ref 0–32)
AST SERPL-CCNC: 20 IU/L (ref 0–40)
BILIRUB SERPL-MCNC: <0.2 MG/DL (ref 0–1.2)
BUN SERPL-MCNC: 22 MG/DL (ref 8–27)
BUN/CREAT SERPL: 16 (ref 12–28)
CALCIUM SERPL-MCNC: 9.8 MG/DL (ref 8.7–10.3)
CHLORIDE SERPL-SCNC: 106 MMOL/L (ref 96–106)
CHOLEST SERPL-MCNC: 184 MG/DL (ref 100–199)
CO2 SERPL-SCNC: 23 MMOL/L (ref 20–29)
CREAT SERPL-MCNC: 1.35 MG/DL (ref 0.57–1)
CREAT UR-MCNC: 96.3 MG/DL
EST. AVERAGE GLUCOSE BLD GHB EST-MCNC: 166 MG/DL
GLOBULIN SER CALC-MCNC: 3.3 G/DL (ref 1.5–4.5)
GLUCOSE SERPL-MCNC: 272 MG/DL (ref 65–99)
HBA1C MFR BLD: 7.4 % (ref 4.8–5.6)
HDLC SERPL-MCNC: 42 MG/DL
INTERPRETATION, 910389: NORMAL
INTERPRETATION: NORMAL
LDLC SERPL CALC-MCNC: 104 MG/DL (ref 0–99)
Lab: NORMAL
Lab: NORMAL
MICROALBUMIN UR-MCNC: 617.8 UG/ML
PDF IMAGE, 910387: NORMAL
POTASSIUM SERPL-SCNC: 4.5 MMOL/L (ref 3.5–5.2)
PROT SERPL-MCNC: 7.2 G/DL (ref 6–8.5)
SODIUM SERPL-SCNC: 144 MMOL/L (ref 134–144)
SPECIMEN STATUS REPORT, ROLRST: NORMAL
TRIGL SERPL-MCNC: 188 MG/DL (ref 0–149)
VLDLC SERPL CALC-MCNC: 38 MG/DL (ref 5–40)

## 2019-05-03 ENCOUNTER — ED HISTORICAL/CONVERTED ENCOUNTER (OUTPATIENT)
Dept: OTHER | Age: 74
End: 2019-05-03

## 2019-05-31 DIAGNOSIS — E11.49 TYPE II OR UNSPECIFIED TYPE DIABETES MELLITUS WITH NEUROLOGICAL MANIFESTATIONS, UNCONTROLLED(250.62) (HCC): ICD-10-CM

## 2019-05-31 DIAGNOSIS — Z79.4 TYPE 2 DIABETES MELLITUS WITH HYPERGLYCEMIA, WITH LONG-TERM CURRENT USE OF INSULIN (HCC): ICD-10-CM

## 2019-05-31 DIAGNOSIS — I73.9 PAD (PERIPHERAL ARTERY DISEASE) (HCC): ICD-10-CM

## 2019-05-31 DIAGNOSIS — I10 ESSENTIAL HYPERTENSION: ICD-10-CM

## 2019-05-31 DIAGNOSIS — E78.2 MIXED HYPERLIPIDEMIA: ICD-10-CM

## 2019-05-31 DIAGNOSIS — E83.52 HYPERCALCEMIA: ICD-10-CM

## 2019-05-31 DIAGNOSIS — E55.9 VITAMIN D DEFICIENCY: ICD-10-CM

## 2019-05-31 DIAGNOSIS — Z79.4 UNCONTROLLED TYPE 2 DIABETES MELLITUS WITH HYPERGLYCEMIA, WITH LONG-TERM CURRENT USE OF INSULIN (HCC): ICD-10-CM

## 2019-05-31 DIAGNOSIS — E53.8 VITAMIN B12 DEFICIENCY: ICD-10-CM

## 2019-05-31 DIAGNOSIS — E11.65 UNCONTROLLED TYPE 2 DIABETES MELLITUS WITH HYPERGLYCEMIA, WITH LONG-TERM CURRENT USE OF INSULIN (HCC): ICD-10-CM

## 2019-05-31 DIAGNOSIS — E78.5 HYPERLIPIDEMIA, UNSPECIFIED HYPERLIPIDEMIA TYPE: ICD-10-CM

## 2019-05-31 DIAGNOSIS — G62.9 NEUROPATHY: ICD-10-CM

## 2019-05-31 DIAGNOSIS — E11.65 TYPE 2 DIABETES MELLITUS WITH HYPERGLYCEMIA, WITH LONG-TERM CURRENT USE OF INSULIN (HCC): ICD-10-CM

## 2019-05-31 RX ORDER — GABAPENTIN 300 MG/1
CAPSULE ORAL
Qty: 180 CAP | Refills: 0 | Status: SHIPPED | OUTPATIENT
Start: 2019-05-31 | End: 2019-06-29 | Stop reason: SDUPTHER

## 2019-06-29 DIAGNOSIS — G62.9 NEUROPATHY: ICD-10-CM

## 2019-06-29 DIAGNOSIS — E53.8 VITAMIN B12 DEFICIENCY: ICD-10-CM

## 2019-06-29 DIAGNOSIS — Z79.4 UNCONTROLLED TYPE 2 DIABETES MELLITUS WITH HYPERGLYCEMIA, WITH LONG-TERM CURRENT USE OF INSULIN (HCC): ICD-10-CM

## 2019-06-29 DIAGNOSIS — E11.65 TYPE 2 DIABETES MELLITUS WITH HYPERGLYCEMIA, WITH LONG-TERM CURRENT USE OF INSULIN (HCC): ICD-10-CM

## 2019-06-29 DIAGNOSIS — E11.49 TYPE II OR UNSPECIFIED TYPE DIABETES MELLITUS WITH NEUROLOGICAL MANIFESTATIONS, UNCONTROLLED(250.62) (HCC): ICD-10-CM

## 2019-06-29 DIAGNOSIS — E78.2 MIXED HYPERLIPIDEMIA: ICD-10-CM

## 2019-06-29 DIAGNOSIS — E83.52 HYPERCALCEMIA: ICD-10-CM

## 2019-06-29 DIAGNOSIS — E78.5 HYPERLIPIDEMIA, UNSPECIFIED HYPERLIPIDEMIA TYPE: ICD-10-CM

## 2019-06-29 DIAGNOSIS — Z79.4 TYPE 2 DIABETES MELLITUS WITH HYPERGLYCEMIA, WITH LONG-TERM CURRENT USE OF INSULIN (HCC): ICD-10-CM

## 2019-06-29 DIAGNOSIS — I73.9 PAD (PERIPHERAL ARTERY DISEASE) (HCC): ICD-10-CM

## 2019-06-29 DIAGNOSIS — E11.65 UNCONTROLLED TYPE 2 DIABETES MELLITUS WITH HYPERGLYCEMIA, WITH LONG-TERM CURRENT USE OF INSULIN (HCC): ICD-10-CM

## 2019-06-29 DIAGNOSIS — I10 ESSENTIAL HYPERTENSION: ICD-10-CM

## 2019-06-29 DIAGNOSIS — E55.9 VITAMIN D DEFICIENCY: ICD-10-CM

## 2019-06-29 RX ORDER — GABAPENTIN 300 MG/1
CAPSULE ORAL
Qty: 180 CAP | Refills: 0 | Status: SHIPPED | OUTPATIENT
Start: 2019-06-29 | End: 2019-07-01 | Stop reason: SDUPTHER

## 2019-06-29 RX ORDER — METFORMIN HYDROCHLORIDE 1000 MG/1
TABLET ORAL
Qty: 180 TAB | Refills: 0 | Status: SHIPPED | OUTPATIENT
Start: 2019-06-29 | End: 2019-09-27 | Stop reason: SDUPTHER

## 2019-07-01 DIAGNOSIS — Z79.4 UNCONTROLLED TYPE 2 DIABETES MELLITUS WITH HYPERGLYCEMIA, WITH LONG-TERM CURRENT USE OF INSULIN (HCC): ICD-10-CM

## 2019-07-01 DIAGNOSIS — Z79.4 TYPE 2 DIABETES MELLITUS WITH HYPERGLYCEMIA, WITH LONG-TERM CURRENT USE OF INSULIN (HCC): ICD-10-CM

## 2019-07-01 DIAGNOSIS — E53.8 VITAMIN B12 DEFICIENCY: ICD-10-CM

## 2019-07-01 DIAGNOSIS — E11.49 TYPE II OR UNSPECIFIED TYPE DIABETES MELLITUS WITH NEUROLOGICAL MANIFESTATIONS, UNCONTROLLED(250.62) (HCC): ICD-10-CM

## 2019-07-01 DIAGNOSIS — E78.2 MIXED HYPERLIPIDEMIA: ICD-10-CM

## 2019-07-01 DIAGNOSIS — I10 ESSENTIAL HYPERTENSION: ICD-10-CM

## 2019-07-01 DIAGNOSIS — G62.9 NEUROPATHY: ICD-10-CM

## 2019-07-01 DIAGNOSIS — E11.65 TYPE 2 DIABETES MELLITUS WITH HYPERGLYCEMIA, WITH LONG-TERM CURRENT USE OF INSULIN (HCC): ICD-10-CM

## 2019-07-01 DIAGNOSIS — I73.9 PAD (PERIPHERAL ARTERY DISEASE) (HCC): ICD-10-CM

## 2019-07-01 DIAGNOSIS — E78.5 HYPERLIPIDEMIA, UNSPECIFIED HYPERLIPIDEMIA TYPE: ICD-10-CM

## 2019-07-01 DIAGNOSIS — E55.9 VITAMIN D DEFICIENCY: ICD-10-CM

## 2019-07-01 DIAGNOSIS — E83.52 HYPERCALCEMIA: ICD-10-CM

## 2019-07-01 DIAGNOSIS — E11.65 UNCONTROLLED TYPE 2 DIABETES MELLITUS WITH HYPERGLYCEMIA, WITH LONG-TERM CURRENT USE OF INSULIN (HCC): ICD-10-CM

## 2019-07-01 RX ORDER — GABAPENTIN 300 MG/1
CAPSULE ORAL
Qty: 180 CAP | Refills: 2 | Status: SHIPPED | OUTPATIENT
Start: 2019-07-01 | End: 2019-09-27 | Stop reason: SDUPTHER

## 2019-08-07 DIAGNOSIS — I10 ESSENTIAL HYPERTENSION: ICD-10-CM

## 2019-08-07 RX ORDER — VALSARTAN AND HYDROCHLOROTHIAZIDE 320; 25 MG/1; MG/1
TABLET, FILM COATED ORAL
Qty: 90 TAB | Refills: 0 | Status: SHIPPED | OUTPATIENT
Start: 2019-08-07 | End: 2019-09-27

## 2019-09-03 RX ORDER — LIRAGLUTIDE 6 MG/ML
INJECTION SUBCUTANEOUS
Qty: 9 ML | Refills: 0 | Status: SHIPPED | OUTPATIENT
Start: 2019-09-03 | End: 2019-09-27

## 2019-09-25 ENCOUNTER — TELEPHONE (OUTPATIENT)
Dept: ENDOCRINOLOGY | Age: 74
End: 2019-09-25

## 2019-09-25 DIAGNOSIS — E11.49 TYPE II OR UNSPECIFIED TYPE DIABETES MELLITUS WITH NEUROLOGICAL MANIFESTATIONS, UNCONTROLLED(250.62) (HCC): Primary | ICD-10-CM

## 2019-09-26 LAB
ALBUMIN SERPL-MCNC: 4.1 G/DL (ref 3.5–4.8)
ALBUMIN/GLOB SERPL: 1.4 {RATIO} (ref 1.2–2.2)
ALP SERPL-CCNC: 114 IU/L (ref 39–117)
ALT SERPL-CCNC: 19 IU/L (ref 0–32)
AST SERPL-CCNC: 17 IU/L (ref 0–40)
BILIRUB SERPL-MCNC: 0.3 MG/DL (ref 0–1.2)
BUN SERPL-MCNC: 22 MG/DL (ref 8–27)
BUN/CREAT SERPL: 17 (ref 12–28)
CALCIUM SERPL-MCNC: 9.5 MG/DL (ref 8.7–10.3)
CHLORIDE SERPL-SCNC: 105 MMOL/L (ref 96–106)
CHOLEST SERPL-MCNC: 213 MG/DL (ref 100–199)
CO2 SERPL-SCNC: 22 MMOL/L (ref 20–29)
CREAT SERPL-MCNC: 1.32 MG/DL (ref 0.57–1)
EST. AVERAGE GLUCOSE BLD GHB EST-MCNC: 163 MG/DL
GLOBULIN SER CALC-MCNC: 2.9 G/DL (ref 1.5–4.5)
GLUCOSE SERPL-MCNC: 152 MG/DL (ref 65–99)
HBA1C MFR BLD: 7.3 % (ref 4.8–5.6)
HDLC SERPL-MCNC: 39 MG/DL
INTERPRETATION, 910389: NORMAL
INTERPRETATION: NORMAL
LDLC SERPL CALC-MCNC: 135 MG/DL (ref 0–99)
Lab: NORMAL
PDF IMAGE, 910387: NORMAL
POTASSIUM SERPL-SCNC: 4.6 MMOL/L (ref 3.5–5.2)
PROT SERPL-MCNC: 7 G/DL (ref 6–8.5)
SODIUM SERPL-SCNC: 144 MMOL/L (ref 134–144)
TRIGL SERPL-MCNC: 194 MG/DL (ref 0–149)
VLDLC SERPL CALC-MCNC: 39 MG/DL (ref 5–40)

## 2019-09-26 NOTE — PROGRESS NOTES
Yesi Leyva is a 69 y.o. female here for   Chief Complaint   Patient presents with    Diabetes       Functional glucose monitor and record keeping system? -yes   Eye exam within last year? -Dr. Atiya Mckeon exam within last year? - on file    1. Have you been to the ER, urgent care clinic since your last visit? Hospitalized since your last visit? -no    2. Have you seen or consulted any other health care providers outside of the [address] [place] [address] since your last visit? Include any pap smears or colon screening. -PCP

## 2019-09-27 ENCOUNTER — OFFICE VISIT (OUTPATIENT)
Dept: ENDOCRINOLOGY | Age: 74
End: 2019-09-27

## 2019-09-27 VITALS
HEIGHT: 63 IN | RESPIRATION RATE: 14 BRPM | HEART RATE: 82 BPM | DIASTOLIC BLOOD PRESSURE: 68 MMHG | OXYGEN SATURATION: 100 % | BODY MASS INDEX: 31.73 KG/M2 | SYSTOLIC BLOOD PRESSURE: 150 MMHG | TEMPERATURE: 96.9 F

## 2019-09-27 DIAGNOSIS — E83.52 HYPERCALCEMIA: ICD-10-CM

## 2019-09-27 DIAGNOSIS — Z79.4 UNCONTROLLED TYPE 2 DIABETES MELLITUS WITH HYPERGLYCEMIA, WITH LONG-TERM CURRENT USE OF INSULIN (HCC): ICD-10-CM

## 2019-09-27 DIAGNOSIS — E11.21 TYPE 2 DIABETES MELLITUS WITH NEPHROPATHY (HCC): Primary | ICD-10-CM

## 2019-09-27 DIAGNOSIS — E11.65 UNCONTROLLED TYPE 2 DIABETES MELLITUS WITH HYPERGLYCEMIA, WITH LONG-TERM CURRENT USE OF INSULIN (HCC): ICD-10-CM

## 2019-09-27 DIAGNOSIS — E78.2 MIXED HYPERLIPIDEMIA: ICD-10-CM

## 2019-09-27 DIAGNOSIS — I73.9 PAD (PERIPHERAL ARTERY DISEASE) (HCC): ICD-10-CM

## 2019-09-27 DIAGNOSIS — E11.65 TYPE 2 DIABETES MELLITUS WITH HYPERGLYCEMIA, WITH LONG-TERM CURRENT USE OF INSULIN (HCC): ICD-10-CM

## 2019-09-27 DIAGNOSIS — E55.9 VITAMIN D DEFICIENCY: ICD-10-CM

## 2019-09-27 DIAGNOSIS — Z79.4 TYPE 2 DIABETES MELLITUS WITH HYPERGLYCEMIA, WITH LONG-TERM CURRENT USE OF INSULIN (HCC): ICD-10-CM

## 2019-09-27 DIAGNOSIS — M48.02 SPINAL STENOSIS IN CERVICAL REGION: ICD-10-CM

## 2019-09-27 DIAGNOSIS — I10 ESSENTIAL HYPERTENSION: ICD-10-CM

## 2019-09-27 DIAGNOSIS — G62.9 NEUROPATHY: ICD-10-CM

## 2019-09-27 DIAGNOSIS — E78.5 HYPERLIPIDEMIA, UNSPECIFIED HYPERLIPIDEMIA TYPE: ICD-10-CM

## 2019-09-27 DIAGNOSIS — E53.8 VITAMIN B12 DEFICIENCY: ICD-10-CM

## 2019-09-27 DIAGNOSIS — E11.49 TYPE II OR UNSPECIFIED TYPE DIABETES MELLITUS WITH NEUROLOGICAL MANIFESTATIONS, UNCONTROLLED(250.62) (HCC): ICD-10-CM

## 2019-09-27 RX ORDER — LOSARTAN POTASSIUM 50 MG/1
TABLET ORAL
Refills: 1 | COMMUNITY
Start: 2019-08-27 | End: 2022-01-01

## 2019-09-27 NOTE — LETTER
9/28/19 Patient: Casey Rendonr YOB: 1945 Date of Visit: 9/27/2019 Deloris Escalante MD 
[address] [address] VIA Facsimile: [phone] Dear Deloris Escalante MD, Thank you for referring Ms. Katz See to [address] [address] for evaluation. My notes for this consultation are attached. If you have questions, please do not hesitate to call me. I look forward to following your patient along with you. Sincerely, Manuel Jon MD

## 2019-09-27 NOTE — PROGRESS NOTES
[facility]               Pat Moore MD       Liseth Romano  1945        Chief Complaint   Patient presents with    Diabetes       History of Present Illness: Liseth Romano is a 69 y.o. female here for fu  of  Type 2 Diabetes Mellitus. She has more weakness of the right leg, some pain. Seen neurology  Has severe DJD, offered surgery which she has refused. She is on Lantus  Did not bring the meter  No hypoglycemia  Not checking the sugars consistently    Reports pain in the upper extremity, weakness  January 2018: Had cervical fusion    PAD PCI , stenting Seen by Vascular surgeon, at [address] [place] [address] at [facility]  - Dr Virginie Johnston             Type 2 Diabetes was diagnosed 30 yrs ago. End organ effects of diabetes: peripheral neuropathy. Cardiovascular risk factors: dyslipidemia, diabetes mellitus, obesity, sedentary life style, hypertension       Wt Readings from Last 3 Encounters:   10/25/16 179 lb 1.6 oz (81.2 kg)   07/18/16 176 lb (79.8 kg)   04/11/16 170 lb 8 oz (77.3 kg)       BP Readings from Last 3 Encounters:   09/27/19 150/68   04/08/19 165/69   05/24/18 155/68           Past Medical History:   Diagnosis Date    Diabetes mellitus ([facility] [address]     HTN (hypertension)     Hyperlipidemia     Neuropathy     PAD (peripheral artery disease) (HCC)     PCI    Sciatica      Current Outpatient Medications   Medication Sig    losartan (COZAAR) 50 mg tablet TK 2 TS PO QD IN THE EVENING    gabapentin (NEURONTIN) 300 mg capsule TAKE 2 CAPSULES BY MOUTH THREE TIMES DAILY (Patient taking differently: TAKE 2 CAPSULES BY MOUTH FIVE TIMES DAILY)    metFORMIN (GLUCOPHAGE) 1,000 mg tablet TAKE 1 TABLET BY MOUTH TWICE DAILY WITH MEALS    VICTOZA 3-BANDAR 0.6 mg/0.1 mL (18 mg/3 mL) pnij ADMINISTER 1.8 MG UNDER THE SKIN EVERY MORNING    clopidogrel (PLAVIX) 75 mg tab Take 75 mg by mouth daily.     insulin glargine (LANTUS SOLOSTAR U-100 INSULIN) 100 unit/mL (3 mL) inpn ADMINISTER 22 UNITS UNDER THE SKIN IN THE MORNING    Insulin Needles, Disposable, (CHELY PEN NEEDLE) 32 gauge x 5/32\" ndle USE TO INJECT LANTUS AND VICTOZA EVERY DAY    TRUEPLUS LANCETS 33 gauge misc USE TO TEST BLOOD SUGAR THREE TIMES DAILY    glucose blood VI test strips (TRUE METRIX GLUCOSE TEST STRIP) strip Test blood glucose 3 time daily Dx Code: E11.65    Blood-Glucose Meter (TRUE METRIX AIR GLUCOSE METER) monitoring kit Test blood glucose 3 time daily Dx Code: E11.65    CARTIA  mg ER capsule TAKE 1 CAPSULE BY MOUTH DAILY    CHELY PEN NEEDLE 32 gauge x 5/32\" ndle USE TO INJECT LANTUS AND VICTOZA EVERY DAY    ferrous sulfate 325 mg (65 mg iron) tablet Take 1 Tab by mouth two (2) times a day.  polyethylene glycol (MIRALAX) 17 gram packet Take 1 Packet by mouth daily.  oxyCODONE IR (ROXICODONE) 5 mg immediate release tablet Take 5 mg by mouth every six (6) hours as needed for Pain.  HYDROcodone-acetaminophen (NORCO) 5-325 mg per tablet Take  by mouth.  VICTOZA 3-BANDAR 0.6 mg/0.1 mL (18 mg/3 mL) pnij ADMINISTER 1.8 MG UNDER THE SKIN DAILY    valsartan-hydroCHLOROthiazide (DIOVAN-HCT) 320-25 mg per tablet TAKE 1 TABLET BY MOUTH EVERY DAY    oxyCODONE-acetaminophen (PERCOCET) 5-325 mg per tablet TK 1 OR 2 TS PO Q 4 H PRN P / CRAMPS    dilTIAZem CD (CARTIA XT) 180 mg ER capsule TAKE 1 CAPSULE BY MOUTH DAILY    atorvastatin (LIPITOR) 20 mg tablet TAKE 1 TABLET BY MOUTH NIGHTLY     No current facility-administered medications for this visit. No Known Allergies      Review of Systems:  - Constitutional Symptoms: no fevers, no chills  - Respiratory: no cough no shortness of breath  - Gastrointestinal: no dysphagia no  abdominal pain  - Musculoskeletal: no joint pains + weakness  - Integumentary: no rashes  - Neurological: + numbness, tingling, no  headaches  -     Physical Examination:   Blood pressure 150/68, pulse 82, temperature 96.9 °F (36.1 °C), temperature source Oral, resp.  rate 14, height 5' 3\" (1.6 m), SpO2 100 %. Estimated body mass index is 31.73 kg/m² as calculated from the following:    Height as of this encounter: 5' 3\" (1.6 m). -   Weight as of 10/25/16: 179 lb 1.6 oz (81.2 kg).   - General: pleasant, no distress, good eye contact  - HEENT: no pallor, no periorbital edema, EOMI  - Neck: supple  - Cardiovascular: regular, normal S1 and S2  - Respiratory: clear to auscultation bilaterally  - Gastrointestinal: soft, nontender, nondistended,  BS +  Ext - toe amputation ,  - Neurological: alert and oriented  - Psychiatric: normal mood and affect  - Skin: color, texture, turgor normal.     Diabetic foot exam: April 2019    Left:    Vibratory sensation absent    Filament test absent sensation with micro filament   Pulse DP: 1+ (weak)    Deformities: Onychomycosis, history of foot ulcers, callus    Right:    Vibratory sensation absent   Filament test absent sensation with micro filament   Pulse DP: 1+ (weak)              Deformities: Great toe amputation            Lab Results   Component Value Date/Time    Hemoglobin A1c 7.3 (H) 09/25/2019 02:22 PM    Hemoglobin A1c 7.4 (H) 04/03/2019 10:19 AM    Hemoglobin A1c 6.9 (H) 06/30/2017 02:00 PM    Microalb/Creat ratio (ug/mg creat.) 641.5 (H) 04/08/2019 03:56 PM    LDL, calculated 135 (H) 09/25/2019 02:22 PM    Creatinine 1.32 (H) 09/25/2019 02:22 PM      Lab Results   Component Value Date/Time    GFR est AA 46 (L) 09/25/2019 02:22 PM    GFR est non-AA 40 (L) 09/25/2019 02:22 PM    Creatinine 1.32 (H) 09/25/2019 02:22 PM    BUN 22 09/25/2019 02:22 PM    Sodium 144 09/25/2019 02:22 PM    Potassium 4.6 09/25/2019 02:22 PM    Chloride 105 09/25/2019 02:22 PM    CO2 22 09/25/2019 02:22 PM          Lab Results   Component Value Date/Time    Hemoglobin A1c 7.3 (H) 09/25/2019 02:22 PM    Hemoglobin A1c 7.4 (H) 04/03/2019 10:19 AM    Hemoglobin A1c 6.9 (H) 06/30/2017 02:00 PM    Microalb/Creat ratio (ug/mg creat.) 641.5 (H) 04/08/2019 03:56 PM    LDL, calculated 135 (H) 09/25/2019 02:22 PM    Creatinine 1.32 (H) 09/25/2019 02:22 PM          Assessment/Plan:       1. Type 2 Diabetes Mellitus with neuropathy,nephropathy  Lab Results   Component Value Date/Time    Hemoglobin A1c 7.3 (H) 09/25/2019 02:22 PM    Hemoglobin A1c (POC) 6.3 05/24/2018 11:07 AM   Limited activity  lantus 22 units   She will continue metformin 500 mg BID   Victoza  1.8 mg daily . SMBG 2 x day  FLU annually ,Pneumovax ,aspirin daily,annual eye exam,microalbumin. 2.  HTN with microalbumin : Continue present management    3. Hyperlipidemia : Continue statin. 4. Peripheral neuropathy -Had PCI ,stent right LE  Metanx did not help  Muscle weakness, wasting  Physical therapy did not help according to her and she was in the rehab      5 PAD -PCI - foot ulcers  March 19th 2016 she had toe right  amputation, followed by wound clinic    6 Hypercalcemia - resolved     Cervical radiculopathy: Status post fusion, has persistent pain and weakness  Exercises  To follow-up with neurosurgery    Thank you for allowing me to participate in the care of this patient. Jennifer Hyde MD      Patient verbalized understanding        Spent more than 40 minutes face-to-face with patient of which greater than 50% of the visit was spent in counseling, importance of the diet, activity, long-term complications and the importance of checking the blood glucose to see the progress.

## 2019-09-30 RX ORDER — METFORMIN HYDROCHLORIDE 1000 MG/1
TABLET ORAL
Qty: 180 TAB | Refills: 0 | Status: SHIPPED | OUTPATIENT
Start: 2019-09-30 | End: 2019-12-26

## 2019-09-30 RX ORDER — GABAPENTIN 300 MG/1
CAPSULE ORAL
Qty: 180 CAP | Refills: 0 | Status: SHIPPED | OUTPATIENT
Start: 2019-09-30 | End: 2019-10-27 | Stop reason: SDUPTHER

## 2019-09-30 RX ORDER — DILTIAZEM HYDROCHLORIDE 180 MG/1
CAPSULE, COATED, EXTENDED RELEASE ORAL
Qty: 90 CAP | Refills: 0 | Status: SHIPPED | OUTPATIENT
Start: 2019-09-30 | End: 2019-12-26

## 2019-10-14 RX ORDER — LIRAGLUTIDE 6 MG/ML
INJECTION SUBCUTANEOUS
Qty: 9 ML | Refills: 6 | Status: SHIPPED | OUTPATIENT
Start: 2019-10-14 | End: 2020-05-19

## 2019-12-26 DIAGNOSIS — I73.9 PAD (PERIPHERAL ARTERY DISEASE) (HCC): ICD-10-CM

## 2019-12-26 DIAGNOSIS — E78.2 MIXED HYPERLIPIDEMIA: ICD-10-CM

## 2019-12-26 DIAGNOSIS — Z79.4 TYPE 2 DIABETES MELLITUS WITH HYPERGLYCEMIA, WITH LONG-TERM CURRENT USE OF INSULIN (HCC): ICD-10-CM

## 2019-12-26 DIAGNOSIS — Z79.4 UNCONTROLLED TYPE 2 DIABETES MELLITUS WITH HYPERGLYCEMIA, WITH LONG-TERM CURRENT USE OF INSULIN (HCC): ICD-10-CM

## 2019-12-26 DIAGNOSIS — E11.65 TYPE 2 DIABETES MELLITUS WITH HYPERGLYCEMIA, WITH LONG-TERM CURRENT USE OF INSULIN (HCC): ICD-10-CM

## 2019-12-26 DIAGNOSIS — E53.8 VITAMIN B12 DEFICIENCY: ICD-10-CM

## 2019-12-26 DIAGNOSIS — E11.65 UNCONTROLLED TYPE 2 DIABETES MELLITUS WITH HYPERGLYCEMIA, WITH LONG-TERM CURRENT USE OF INSULIN (HCC): ICD-10-CM

## 2019-12-26 DIAGNOSIS — E78.5 HYPERLIPIDEMIA, UNSPECIFIED HYPERLIPIDEMIA TYPE: ICD-10-CM

## 2019-12-26 DIAGNOSIS — G62.9 NEUROPATHY: ICD-10-CM

## 2019-12-26 DIAGNOSIS — E11.49 TYPE II OR UNSPECIFIED TYPE DIABETES MELLITUS WITH NEUROLOGICAL MANIFESTATIONS, UNCONTROLLED(250.62) (HCC): ICD-10-CM

## 2019-12-26 DIAGNOSIS — E55.9 VITAMIN D DEFICIENCY: ICD-10-CM

## 2019-12-26 DIAGNOSIS — E83.52 HYPERCALCEMIA: ICD-10-CM

## 2019-12-26 DIAGNOSIS — I10 ESSENTIAL HYPERTENSION: ICD-10-CM

## 2019-12-26 RX ORDER — METFORMIN HYDROCHLORIDE 1000 MG/1
TABLET ORAL
Qty: 180 TAB | Refills: 0 | Status: SHIPPED | OUTPATIENT
Start: 2019-12-26 | End: 2020-02-05 | Stop reason: ALTCHOICE

## 2019-12-26 RX ORDER — DILTIAZEM HYDROCHLORIDE 180 MG/1
CAPSULE, EXTENDED RELEASE ORAL
Qty: 90 CAP | Refills: 0 | Status: SHIPPED | OUTPATIENT
Start: 2019-12-26 | End: 2020-01-21

## 2020-01-17 ENCOUNTER — ED HISTORICAL/CONVERTED ENCOUNTER (OUTPATIENT)
Dept: OTHER | Age: 75
End: 2020-01-17

## 2020-02-04 NOTE — PROGRESS NOTES
Marilu Cochran is a 69 y.o. female here for   Chief Complaint   Patient presents with    Diabetes       1. Have you been to the ER, urgent care clinic since your last visit? Hospitalized since your last visit? -no    2. Have you seen or consulted any other health care providers outside of the [address] [place] [address] since your last visit? Include any pap smears or colon screening. -PCP

## 2020-02-05 ENCOUNTER — OFFICE VISIT (OUTPATIENT)
Dept: ENDOCRINOLOGY | Age: 75
End: 2020-02-05

## 2020-02-05 VITALS
HEART RATE: 86 BPM | TEMPERATURE: 97.5 F | HEIGHT: 63 IN | DIASTOLIC BLOOD PRESSURE: 72 MMHG | OXYGEN SATURATION: 98 % | SYSTOLIC BLOOD PRESSURE: 177 MMHG | BODY MASS INDEX: 31.73 KG/M2 | RESPIRATION RATE: 18 BRPM

## 2020-02-05 DIAGNOSIS — G62.9 NEUROPATHY: ICD-10-CM

## 2020-02-05 DIAGNOSIS — Z79.4 UNCONTROLLED TYPE 2 DIABETES MELLITUS WITH HYPERGLYCEMIA, WITH LONG-TERM CURRENT USE OF INSULIN (HCC): ICD-10-CM

## 2020-02-05 DIAGNOSIS — I10 ESSENTIAL HYPERTENSION: ICD-10-CM

## 2020-02-05 DIAGNOSIS — E11.49 TYPE II OR UNSPECIFIED TYPE DIABETES MELLITUS WITH NEUROLOGICAL MANIFESTATIONS, UNCONTROLLED(250.62) (HCC): ICD-10-CM

## 2020-02-05 DIAGNOSIS — E83.52 HYPERCALCEMIA: ICD-10-CM

## 2020-02-05 DIAGNOSIS — E78.2 MIXED HYPERLIPIDEMIA: ICD-10-CM

## 2020-02-05 DIAGNOSIS — E53.8 VITAMIN B12 DEFICIENCY: ICD-10-CM

## 2020-02-05 DIAGNOSIS — E11.65 UNCONTROLLED TYPE 2 DIABETES MELLITUS WITH HYPERGLYCEMIA, WITH LONG-TERM CURRENT USE OF INSULIN (HCC): ICD-10-CM

## 2020-02-05 DIAGNOSIS — E78.5 HYPERLIPIDEMIA, UNSPECIFIED HYPERLIPIDEMIA TYPE: ICD-10-CM

## 2020-02-05 DIAGNOSIS — I73.9 PAD (PERIPHERAL ARTERY DISEASE) (HCC): ICD-10-CM

## 2020-02-05 DIAGNOSIS — E11.21 TYPE 2 DIABETES MELLITUS WITH NEPHROPATHY (HCC): Primary | ICD-10-CM

## 2020-02-05 DIAGNOSIS — E11.65 TYPE 2 DIABETES MELLITUS WITH HYPERGLYCEMIA, WITH LONG-TERM CURRENT USE OF INSULIN (HCC): ICD-10-CM

## 2020-02-05 DIAGNOSIS — E55.9 VITAMIN D DEFICIENCY: ICD-10-CM

## 2020-02-05 DIAGNOSIS — Z79.4 TYPE 2 DIABETES MELLITUS WITH HYPERGLYCEMIA, WITH LONG-TERM CURRENT USE OF INSULIN (HCC): ICD-10-CM

## 2020-02-05 LAB — HBA1C MFR BLD HPLC: 6.5 %

## 2020-02-05 RX ORDER — ATORVASTATIN CALCIUM 20 MG/1
TABLET, FILM COATED ORAL
Qty: 30 TAB | Refills: 5 | Status: SHIPPED | OUTPATIENT
Start: 2020-02-05 | End: 2020-07-11

## 2020-02-05 RX ORDER — METFORMIN HYDROCHLORIDE 500 MG/1
500 TABLET, EXTENDED RELEASE ORAL 2 TIMES DAILY
Qty: 180 TAB | Refills: 3 | Status: SHIPPED | OUTPATIENT
Start: 2020-02-05 | End: 2021-06-18

## 2020-02-05 NOTE — LETTER
2/5/20 Patient: Yamile Padilla YOB: 1945 Date of Visit: 2/5/2020 Rocio Stephens MD 
[address] [address] VIA Facsimile: [phone] Dear Rocio Stephens MD, Thank you for referring Ms. Bin Clarke to [address] [place] [address] for evaluation. My notes for this consultation are attached. If you have questions, please do not hesitate to call me. I look forward to following your patient along with you. Sincerely, Sony Peres MD

## 2020-02-05 NOTE — PROGRESS NOTES
[facility]               Janene Guerra MD       Lauren Villanueva  1945        Chief Complaint   Patient presents with    Diabetes       History of Present Illness: Lauren Villanueva is a 69 y.o. female here for fu  of  Type 2 Diabetes Mellitus. She is seeing vascular surgeon for PAD  Taking the insulin consistently  No hypoglycemia  Seen neurology  Has severe DJD, offered surgery which she has refused. Reports pain in the upper extremity, weakness  January 2018: Had cervical fusion    PAD PCI , stenting Seen by Vascular surgeon, at [address] [place] [address] at [facility]  - Dr Sharri Archibald             Type 2 Diabetes was diagnosed 30 yrs ago. End organ effects of diabetes: peripheral neuropathy. Cardiovascular risk factors: dyslipidemia, diabetes mellitus, obesity, sedentary life style, hypertension       Wt Readings from Last 3 Encounters:   10/25/16 179 lb 1.6 oz (81.2 kg)   07/18/16 176 lb (79.8 kg)   04/11/16 170 lb 8 oz (77.3 kg)       BP Readings from Last 3 Encounters:   02/05/20 177/72   09/27/19 150/68   04/08/19 165/69           Past Medical History:   Diagnosis Date    Diabetes mellitus ([facility] [address]     HTN (hypertension)     Hyperlipidemia     Neuropathy     PAD (peripheral artery disease) (HCC)     PCI    Sciatica      Current Outpatient Medications   Medication Sig    dilTIAZem CD (CARDIZEM CD) 180 mg ER capsule TAKE 1 CAPSULE BY MOUTH DAILY    gabapentin (NEURONTIN) 300 mg capsule TAKE 2 CAPSULES BY MOUTH THREE TIMES DAILY WITH MEALS    VICTOZA 3-BANDAR 0.6 mg/0.1 mL (18 mg/3 mL) pnij ADMINISTER 1.8 MG UNDER THE SKIN DAILY    losartan (COZAAR) 50 mg tablet TK 2 TS PO QD IN THE EVENING    VICTOZA 3-BANDAR 0.6 mg/0.1 mL (18 mg/3 mL) pnij ADMINISTER 1.8 MG UNDER THE SKIN EVERY MORNING    clopidogrel (PLAVIX) 75 mg tab Take 75 mg by mouth daily.     insulin glargine (LANTUS SOLOSTAR U-100 INSULIN) 100 unit/mL (3 mL) inpn ADMINISTER 22 UNITS UNDER THE SKIN IN THE MORNING    Insulin Needles, Disposable, (CHELY PEN NEEDLE) 32 gauge x 5/32\" ndle USE TO INJECT LANTUS AND VICTOZA EVERY DAY    TRUEPLUS LANCETS 33 gauge misc USE TO TEST BLOOD SUGAR THREE TIMES DAILY    glucose blood VI test strips (TRUE METRIX GLUCOSE TEST STRIP) strip Test blood glucose 3 time daily Dx Code: E11.65    Blood-Glucose Meter (TRUE METRIX AIR GLUCOSE METER) monitoring kit Test blood glucose 3 time daily Dx Code: E11.65    CHELY PEN NEEDLE 32 gauge x 5/32\" ndle USE TO INJECT LANTUS AND VICTOZA EVERY DAY    ferrous sulfate 325 mg (65 mg iron) tablet Take 1 Tab by mouth two (2) times a day.  polyethylene glycol (MIRALAX) 17 gram packet Take 1 Packet by mouth daily.  oxyCODONE IR (ROXICODONE) 5 mg immediate release tablet Take 5 mg by mouth every six (6) hours as needed for Pain.  HYDROcodone-acetaminophen (NORCO) 5-325 mg per tablet Take  by mouth.  atorvastatin (LIPITOR) 20 mg tablet TAKE 1 TABLET BY MOUTH NIGHTLY    metFORMIN ER (GLUCOPHAGE XR) 500 mg tablet Take 1 Tab by mouth two (2) times a day. No current facility-administered medications for this visit. No Known Allergies      Review of Systems:  - Constitutional Symptoms: no fevers, no chills  - Respiratory: no cough no shortness of breath  - Gastrointestinal: no dysphagia no  abdominal pain  - Musculoskeletal: no joint pains + weakness  - Integumentary: no rashes  - Neurological: + numbness, tingling, no  headaches  -     Physical Examination:   Blood pressure 177/72, pulse 86, temperature 97.5 °F (36.4 °C), temperature source Oral, resp. rate 18, height 5' 3\" (1.6 m), SpO2 98 %. Estimated body mass index is 31.73 kg/m² as calculated from the following:    Height as of this encounter: 5' 3\" (1.6 m). -   Weight as of 10/25/16: 179 lb 1.6 oz (81.2 kg).   - General: pleasant, no distress, good eye contact  - HEENT: no pallor, no periorbital edema, EOMI  - Neck: supple  - Cardiovascular: regular, normal S1 and S2  - Respiratory: clear to auscultation bilaterally  - Gastrointestinal: soft, nontender, nondistended,  BS +  Ext - toe amputation ,  - Neurological: alert and oriented  - Psychiatric: normal mood and affect  - Skin: color, texture, turgor normal.     Diabetic foot exam: April 2019    Left:    Vibratory sensation absent    Filament test absent sensation with micro filament   Pulse DP: 1+ (weak)    Deformities: Onychomycosis, history of foot ulcers, callus    Right:    Vibratory sensation absent   Filament test absent sensation with micro filament   Pulse DP: 1+ (weak)              Deformities: Great toe amputation            Lab Results   Component Value Date/Time    Hemoglobin A1c 7.3 (H) 09/25/2019 02:22 PM    Hemoglobin A1c 7.4 (H) 04/03/2019 10:19 AM    Hemoglobin A1c 6.9 (H) 06/30/2017 02:00 PM    Microalb/Creat ratio (ug/mg creat.) 641.5 (H) 04/08/2019 03:56 PM    LDL, calculated 135 (H) 09/25/2019 02:22 PM    Creatinine 1.32 (H) 09/25/2019 02:22 PM      Lab Results   Component Value Date/Time    GFR est AA 46 (L) 09/25/2019 02:22 PM    GFR est non-AA 40 (L) 09/25/2019 02:22 PM    Creatinine 1.32 (H) 09/25/2019 02:22 PM    BUN 22 09/25/2019 02:22 PM    Sodium 144 09/25/2019 02:22 PM    Potassium 4.6 09/25/2019 02:22 PM    Chloride 105 09/25/2019 02:22 PM    CO2 22 09/25/2019 02:22 PM          Lab Results   Component Value Date/Time    Hemoglobin A1c 7.3 (H) 09/25/2019 02:22 PM    Hemoglobin A1c 7.4 (H) 04/03/2019 10:19 AM    Hemoglobin A1c 6.9 (H) 06/30/2017 02:00 PM    Microalb/Creat ratio (ug/mg creat.) 641.5 (H) 04/08/2019 03:56 PM    LDL, calculated 135 (H) 09/25/2019 02:22 PM    Creatinine 1.32 (H) 09/25/2019 02:22 PM          Assessment/Plan:       1.  Type 2 Diabetes Mellitus with neuropathy,nephropathy  Lab Results   Component Value Date/Time    Hemoglobin A1c 7.3 (H) 09/25/2019 02:22 PM    Hemoglobin A1c (POC) 6.5 02/05/2020 02:18 PM   Limited activity  lantus 22 units   She will continue metformin 500 mg BID   Victoza  1.8 mg daily . SMBG 2 x day  FLU annually ,Pneumovax ,aspirin daily,annual eye exam,microalbumin. 2.  HTN with microalbumin : Continue present management    3. Hyperlipidemia : Continue statin. 4. Peripheral neuropathy -Had PCI ,stent right LE  Metanx did not help  Muscle weakness, wasting  Physical therapy did not help according to her and she was in the rehab      5 PAD -PCI - foot ulcers  March 19th 2016 she had toe right  amputation, followed by wound clinic    6 Hypercalcemia - resolved     Cervical radiculopathy: Status post fusion, has persistent pain and weakness  Exercises  To follow-up with neurosurgery    Thank you for allowing me to participate in the care of this patient. Hina Trevizo MD      Patient verbalized understanding        Spent more than 40 minutes face-to-face with patient of which greater than 50% of the visit was spent in counseling, importance of the diet, activity, long-term complications and the importance of checking the blood glucose to see the progress.

## 2020-02-10 ENCOUNTER — OP HISTORICAL/CONVERTED ENCOUNTER (OUTPATIENT)
Dept: OTHER | Age: 75
End: 2020-02-10

## 2020-03-30 DIAGNOSIS — G62.9 NEUROPATHY: ICD-10-CM

## 2020-03-30 DIAGNOSIS — Z79.4 TYPE 2 DIABETES MELLITUS WITH HYPERGLYCEMIA, WITH LONG-TERM CURRENT USE OF INSULIN (HCC): ICD-10-CM

## 2020-03-30 DIAGNOSIS — I73.9 PAD (PERIPHERAL ARTERY DISEASE) (HCC): ICD-10-CM

## 2020-03-30 DIAGNOSIS — E78.5 HYPERLIPIDEMIA, UNSPECIFIED HYPERLIPIDEMIA TYPE: ICD-10-CM

## 2020-03-30 DIAGNOSIS — E11.65 UNCONTROLLED TYPE 2 DIABETES MELLITUS WITH HYPERGLYCEMIA, WITH LONG-TERM CURRENT USE OF INSULIN (HCC): ICD-10-CM

## 2020-03-30 DIAGNOSIS — E78.2 MIXED HYPERLIPIDEMIA: ICD-10-CM

## 2020-03-30 DIAGNOSIS — I10 ESSENTIAL HYPERTENSION: ICD-10-CM

## 2020-03-30 DIAGNOSIS — E11.49 TYPE II OR UNSPECIFIED TYPE DIABETES MELLITUS WITH NEUROLOGICAL MANIFESTATIONS, UNCONTROLLED(250.62) (HCC): ICD-10-CM

## 2020-03-30 DIAGNOSIS — E53.8 VITAMIN B12 DEFICIENCY: ICD-10-CM

## 2020-03-30 DIAGNOSIS — E11.65 TYPE 2 DIABETES MELLITUS WITH HYPERGLYCEMIA, WITH LONG-TERM CURRENT USE OF INSULIN (HCC): ICD-10-CM

## 2020-03-30 DIAGNOSIS — E55.9 VITAMIN D DEFICIENCY: ICD-10-CM

## 2020-03-30 DIAGNOSIS — Z79.4 UNCONTROLLED TYPE 2 DIABETES MELLITUS WITH HYPERGLYCEMIA, WITH LONG-TERM CURRENT USE OF INSULIN (HCC): ICD-10-CM

## 2020-03-30 DIAGNOSIS — E83.52 HYPERCALCEMIA: ICD-10-CM

## 2020-03-30 RX ORDER — PEN NEEDLE, DIABETIC 31 GX3/16"
NEEDLE, DISPOSABLE MISCELLANEOUS
Qty: 200 PEN NEEDLE | Refills: 5 | Status: SHIPPED | OUTPATIENT
Start: 2020-03-30 | End: 2021-01-01

## 2020-04-15 DIAGNOSIS — I10 ESSENTIAL HYPERTENSION: ICD-10-CM

## 2020-04-15 DIAGNOSIS — E78.2 MIXED HYPERLIPIDEMIA: ICD-10-CM

## 2020-04-15 DIAGNOSIS — I73.9 PAD (PERIPHERAL ARTERY DISEASE) (HCC): ICD-10-CM

## 2020-04-15 RX ORDER — INSULIN GLARGINE 100 [IU]/ML
INJECTION, SOLUTION SUBCUTANEOUS
Qty: 15 ML | Refills: 6 | Status: SHIPPED | OUTPATIENT
Start: 2020-04-15 | End: 2021-05-11 | Stop reason: SDUPTHER

## 2020-05-18 ENCOUNTER — TELEPHONE (OUTPATIENT)
Dept: ENDOCRINOLOGY | Age: 75
End: 2020-05-18

## 2020-05-18 DIAGNOSIS — E78.2 MIXED HYPERLIPIDEMIA: ICD-10-CM

## 2020-05-18 DIAGNOSIS — Z79.4 TYPE 2 DIABETES MELLITUS WITH HYPERGLYCEMIA, WITH LONG-TERM CURRENT USE OF INSULIN (HCC): Primary | ICD-10-CM

## 2020-05-18 DIAGNOSIS — E11.65 TYPE 2 DIABETES MELLITUS WITH HYPERGLYCEMIA, WITH LONG-TERM CURRENT USE OF INSULIN (HCC): Primary | ICD-10-CM

## 2020-05-18 DIAGNOSIS — I10 ESSENTIAL HYPERTENSION: ICD-10-CM

## 2020-05-19 RX ORDER — LIRAGLUTIDE 6 MG/ML
INJECTION SUBCUTANEOUS
Qty: 9 ML | Refills: 6 | Status: SHIPPED | OUTPATIENT
Start: 2020-05-19 | End: 2021-01-06

## 2020-06-03 VITALS
TEMPERATURE: 98.6 F | HEIGHT: 64 IN | HEART RATE: 95 BPM | DIASTOLIC BLOOD PRESSURE: 86 MMHG | OXYGEN SATURATION: 100 % | WEIGHT: 187 LBS | BODY MASS INDEX: 31.92 KG/M2 | SYSTOLIC BLOOD PRESSURE: 160 MMHG

## 2020-06-03 RX ORDER — ASPIRIN 81 MG/1
TABLET ORAL DAILY
COMMUNITY
End: 2022-06-27

## 2020-06-09 VITALS
WEIGHT: 187 LBS | SYSTOLIC BLOOD PRESSURE: 160 MMHG | TEMPERATURE: 98.6 F | HEIGHT: 64 IN | DIASTOLIC BLOOD PRESSURE: 86 MMHG | OXYGEN SATURATION: 100 % | BODY MASS INDEX: 31.92 KG/M2 | HEART RATE: 95 BPM

## 2020-06-09 RX ORDER — BRIMONIDINE TARTRATE 2 MG/ML
1 SOLUTION/ DROPS OPHTHALMIC 3 TIMES DAILY
COMMUNITY
End: 2022-06-27

## 2020-06-24 RX ORDER — DILTIAZEM HYDROCHLORIDE 180 MG/1
CAPSULE, COATED, EXTENDED RELEASE ORAL
Qty: 90 CAP | Refills: 0 | Status: SHIPPED | OUTPATIENT
Start: 2020-06-24 | End: 2022-01-01

## 2020-07-11 DIAGNOSIS — E78.2 MIXED HYPERLIPIDEMIA: ICD-10-CM

## 2020-07-11 DIAGNOSIS — E55.9 VITAMIN D DEFICIENCY: ICD-10-CM

## 2020-07-11 DIAGNOSIS — I10 ESSENTIAL HYPERTENSION: ICD-10-CM

## 2020-07-11 DIAGNOSIS — E83.52 HYPERCALCEMIA: ICD-10-CM

## 2020-07-11 DIAGNOSIS — G62.9 NEUROPATHY: ICD-10-CM

## 2020-07-11 DIAGNOSIS — Z79.4 UNCONTROLLED TYPE 2 DIABETES MELLITUS WITH HYPERGLYCEMIA, WITH LONG-TERM CURRENT USE OF INSULIN (HCC): ICD-10-CM

## 2020-07-11 DIAGNOSIS — E11.49 TYPE II OR UNSPECIFIED TYPE DIABETES MELLITUS WITH NEUROLOGICAL MANIFESTATIONS, UNCONTROLLED(250.62) (HCC): ICD-10-CM

## 2020-07-11 DIAGNOSIS — Z79.4 TYPE 2 DIABETES MELLITUS WITH HYPERGLYCEMIA, WITH LONG-TERM CURRENT USE OF INSULIN (HCC): ICD-10-CM

## 2020-07-11 DIAGNOSIS — E78.5 HYPERLIPIDEMIA, UNSPECIFIED HYPERLIPIDEMIA TYPE: ICD-10-CM

## 2020-07-11 DIAGNOSIS — E11.65 TYPE 2 DIABETES MELLITUS WITH HYPERGLYCEMIA, WITH LONG-TERM CURRENT USE OF INSULIN (HCC): ICD-10-CM

## 2020-07-11 DIAGNOSIS — E11.65 UNCONTROLLED TYPE 2 DIABETES MELLITUS WITH HYPERGLYCEMIA, WITH LONG-TERM CURRENT USE OF INSULIN (HCC): ICD-10-CM

## 2020-07-11 DIAGNOSIS — I73.9 PAD (PERIPHERAL ARTERY DISEASE) (HCC): ICD-10-CM

## 2020-07-11 DIAGNOSIS — E53.8 VITAMIN B12 DEFICIENCY: ICD-10-CM

## 2020-07-11 RX ORDER — ATORVASTATIN CALCIUM 20 MG/1
TABLET, FILM COATED ORAL
Qty: 30 TAB | Refills: 5 | Status: SHIPPED | OUTPATIENT
Start: 2020-07-11 | End: 2021-03-12

## 2020-07-21 ENCOUNTER — OFFICE VISIT (OUTPATIENT)
Dept: SURGERY | Age: 75
End: 2020-07-21
Payer: COMMERCIAL

## 2020-07-21 VITALS
BODY MASS INDEX: 31.76 KG/M2 | HEART RATE: 82 BPM | HEIGHT: 64 IN | WEIGHT: 186 LBS | TEMPERATURE: 98.4 F | DIASTOLIC BLOOD PRESSURE: 78 MMHG | SYSTOLIC BLOOD PRESSURE: 128 MMHG | OXYGEN SATURATION: 95 % | RESPIRATION RATE: 22 BRPM

## 2020-07-21 DIAGNOSIS — M79.662 PAIN IN BOTH LOWER LEGS: ICD-10-CM

## 2020-07-21 DIAGNOSIS — M79.661 PAIN IN BOTH LOWER LEGS: ICD-10-CM

## 2020-07-21 DIAGNOSIS — I99.8 ISCHEMIA OF BOTH LOWER EXTREMITIES: Primary | ICD-10-CM

## 2020-07-21 PROCEDURE — 99213 OFFICE O/P EST LOW 20 MIN: CPT | Performed by: SURGERY

## 2020-07-22 PROBLEM — M79.662 PAIN IN BOTH LOWER LEGS: Status: ACTIVE | Noted: 2020-07-22

## 2020-07-22 PROBLEM — M79.661 PAIN IN BOTH LOWER LEGS: Status: ACTIVE | Noted: 2020-07-22

## 2020-07-22 PROBLEM — I99.8 ISCHEMIA OF BOTH LOWER EXTREMITIES: Status: ACTIVE | Noted: 2020-07-22

## 2020-07-22 NOTE — PROGRESS NOTES
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 Patient currently wheelchair-bound due to pain is not able to ambulate. Patient's right foot says worse and pain is constant. . The pain is described as sharp and is 8/10 in intensity. He was seen earlier this year 2020 at that time we talked about doing right leg interventions with angiogram.  Followed by left-sided angiogram angioplasty. Patient currently having significant pain in both sides but worse on the right side. Past Medical History:   Diagnosis Date    Diabetes mellitus ([facility] [address]     HTN (hypertension)     Hyperlipidemia     Neuropathy     PAD (peripheral artery disease) (HCC)     PCI    Sciatica      Past Surgical History:   Procedure Laterality Date    HX AMPUTATION Right     great toe    HX CERVICAL FUSION      HX OTHER SURGICAL Bilateral     Stent placement    HX OTHER SURGICAL      HX VASCULAR STENT Bilateral     2 in right 1 in left    HX VASCULAR STENT Bilateral       Family History   Problem Relation Age of Onset    Cancer Mother     Diabetes Mother     Hypertension Mother      Social History     Tobacco Use    Smoking status: Never Smoker    Smokeless tobacco: Never Used   Substance Use Topics    Alcohol use: Not Currently     Frequency: Never     Binge frequency: Never      Prior to Admission medications    Medication Sig Start Date End Date Taking? Authorizing Provider   atorvastatin (LIPITOR) 20 mg tablet TAKE 1 TABLET BY MOUTH EVERY NIGHT 7/11/20  Yes Patrick Kolb MD   dilTIAZem ER (CARDIZEM CD) 180 mg capsule TAKE 1 CAPSULE BY MOUTH DAILY 6/24/20  Yes Patrick Kolb MD   aspirin delayed-release 81 mg tablet Take  by mouth daily.    Yes Provider, Historical   gabapentin (NEURONTIN) 300 mg capsule TAKE 2 CAPSULES BY MOUTH THREE TIMES DAILY WITH MEALS 3/23/20  Yes Patrick Kolb MD metFORMIN (GLUCOPHAGE) 1,000 mg tablet TAKE 1/2 TABLET BY MOUTH TWICE DAILY WITH MEALS 3/23/20  Yes Jose Alfredo Dotson MD   losartan (COZAAR) 50 mg tablet TK 2 TS PO QD IN THE EVENING 8/27/19  Yes Provider, Historical   VICTOZA 3-SHELDON 0.6 mg/0.1 mL (18 mg/3 mL) pnij ADMINISTER 1.8 MG UNDER THE SKIN EVERY MORNING 4/29/19  Yes Jose Alfredo Dotson MD   clopidogrel (PLAVIX) 75 mg tab Take 75 mg by mouth daily. Yes Provider, Historical   brimonidine (ALPHAGAN) 0.2 % ophthalmic solution Administer 1 Drop to both eyes three (3) times daily. Provider, Historical   Victoza 2-Sheldon 0.6 mg/0.1 mL (18 mg/3 mL) pnij ADMINISTER 1.8 MG UNDER THE SKIN DAILY 5/19/20   Jose Alfredo Dotson MD   insulin glargine (Lantus Solostar U-100 Insulin) 100 unit/mL (3 mL) inpn INJECT 22 UNITS UNDER THE SKIN EVERY MORNING 4/15/20   Jose Alfredo Dotson MD   Insulin Needles, Disposable, ([place] Pen Needle) 32 gauge x 5/32\" ndle USE TO INJECT LANTUS  [facility] 3/30/20   Jose Alfredo Dotson MD   metFORMIN ER (GLUCOPHAGE XR) 500 mg tablet Take 1 Tab by mouth two (2) times a day. 2/5/20   MuthyAnna amanda MD   TRUEPLUS LANCETS 33 gauge misc USE TO TEST BLOOD SUGAR THREE TIMES DAILY 9/3/18   Jose Alfredo Dotson MD   glucose blood VI test strips (TRUE METRIX GLUCOSE TEST STRIP) strip Test blood glucose 3 time daily Dx Code: E11.65 8/31/18   Jose Alfredo Dotson MD   Blood-Glucose Meter (TRUE METRIX AIR GLUCOSE METER) monitoring kit Test blood glucose 3 time daily Dx Code: E11.65 8/31/18   Jose Alfredo Dotson MD   CHEYENNE PEN NEEDLE 32 gauge x 5/32\" ndle USE TO INJECT LANTUS AND VICTOZA EVERY DAY 3/10/18   Jose Alfredo Dotson MD   ferrous sulfate 325 mg (65 mg iron) tablet Take 1 Tab by mouth two (2) times a day. 12/19/17   Jose Alfredo Dotson MD   polyethylene glycol (MIRALAX) 17 gram packet Take 1 Packet by mouth daily.  7/18/16   Anna Nickerson MD   oxyCODONE IR (ROXICODONE) 5 mg immediate release tablet Take 5 mg by mouth every six (6) hours as needed for Pain. Provider, Historical   HYDROcodone-acetaminophen (NORCO) 5-325 mg per tablet Take  by mouth. Provider, Historical      No Known Allergies    Review of Systems  I reviewed the results of systems personally they are negative signed by Dr. Manda Jack. Objective:     Vitals:    07/21/20 1527   BP: 128/78   Pulse: 82   Resp: 22   Temp: 98.4 °F (36.9 °C)   TempSrc: Temporal   SpO2: 95%   Weight: 186 lb (84.4 kg)   Height: 5' 4\" (1.626 m)         Physical Exam  Patient looks well coherent. He will denies any chest pain shortness of breath. His head and neck exam otherwise unremarkable neck is supple. Cardiovascular regular rate pulmonary exams clear abdomen soft neurologically intact. On examination patient has a palpable pedal pulses. But nonpalpable pedal pulses. Patient has dopplerable signals noted on dorsalis pedis pulse. Skin is warm to touch moist.  Assessment:   Diagnoses and all orders for this visit:    1. Ischemia of both lower extremities    2. Pain in both lower legs        Plan:     I discussed with patient's Doppler interrogation studies today. Patient does have a significant pain which is more constant signs are concerning for ischemic limb. We talked about as before we talked about questionable endovascular examinations right leg first followed by left leg interventions and angiogram.  We talked about techniques and possible angioplasty. Also we talked about risk benefits complication. Patient is quite anxious to have this procedure dilation is possible. I will schedule for the procedure sometime in August.  Patient is scheduled for right leg angiogram interventions on August 13, 2020.   Signed By: Micky Navarro MD     July 22, 2020

## 2020-08-12 ENCOUNTER — TELEPHONE (OUTPATIENT)
Dept: SURGERY | Age: 75
End: 2020-08-12

## 2020-08-12 NOTE — TELEPHONE ENCOUNTER
pts daughter called again about this. Jaki Hampton was unable to take call so I told pt there was no surgery for tomorrow due to not having her tests done that you will call her tomorrow.

## 2020-08-13 NOTE — TELEPHONE ENCOUNTER
Called and spoke with pt daughter to reschedule pt procedure. Daughter expressed concern about 'this is so unprofessional.'  I ask her if I could call her back in the am after speaking with Dr. Keya West about another date to put pt on schedule.  Daughter stated, ' sure-whatever.'

## 2020-08-14 DIAGNOSIS — M79.605 PAIN IN BOTH LOWER EXTREMITIES: Primary | ICD-10-CM

## 2020-08-14 DIAGNOSIS — M79.604 PAIN IN BOTH LOWER EXTREMITIES: Primary | ICD-10-CM

## 2020-08-14 RX ORDER — OXYCODONE AND ACETAMINOPHEN 5; 325 MG/1; MG/1
1 TABLET ORAL
Qty: 21 TAB | Refills: 0 | Status: SHIPPED | OUTPATIENT
Start: 2020-08-14 | End: 2020-08-21

## 2020-08-14 NOTE — TELEPHONE ENCOUNTER
Spoke with pt regarding new procedure date. Pt stated she was not able to get her covid screen done and that is why she couldn't have her procedure done on 08/13/2020. .  I have confirmed with Cath lab/meme that th pt is scheduled for 09/01/2020 1230pm, be at [facility] at 1000am. Pt will be contacted by PAT to schedule her covid screen and other PAT testing. Pt verbalized understanding to stop her metformin 2 days before procedure and don not start until one day after the procedure. I offerred to call pt back when her daughter was available to talk and the pt stated no. It is fine. Give me the date. Thank you.

## 2020-08-27 ENCOUNTER — TELEPHONE (OUTPATIENT)
Dept: SURGERY | Age: 75
End: 2020-08-27

## 2020-08-27 NOTE — TELEPHONE ENCOUNTER
Patient's daughter called stating that they want to cancel her mother's surgery. They went on a appointment with the the Doctor that put the stent in and he stated that the stent was in good shape, no blockage at all. They feel there's no need for surgery now. Please call the daughter if there is any question's.

## 2020-11-30 DIAGNOSIS — E11.65 TYPE 2 DIABETES MELLITUS WITH HYPERGLYCEMIA, WITH LONG-TERM CURRENT USE OF INSULIN (HCC): ICD-10-CM

## 2020-11-30 DIAGNOSIS — E78.5 HYPERLIPIDEMIA, UNSPECIFIED HYPERLIPIDEMIA TYPE: ICD-10-CM

## 2020-11-30 DIAGNOSIS — Z79.4 UNCONTROLLED TYPE 2 DIABETES MELLITUS WITH HYPERGLYCEMIA, WITH LONG-TERM CURRENT USE OF INSULIN (HCC): ICD-10-CM

## 2020-11-30 DIAGNOSIS — I73.9 PAD (PERIPHERAL ARTERY DISEASE) (HCC): ICD-10-CM

## 2020-11-30 DIAGNOSIS — I10 ESSENTIAL HYPERTENSION: ICD-10-CM

## 2020-11-30 DIAGNOSIS — E53.8 VITAMIN B12 DEFICIENCY: ICD-10-CM

## 2020-11-30 DIAGNOSIS — E11.49 TYPE II OR UNSPECIFIED TYPE DIABETES MELLITUS WITH NEUROLOGICAL MANIFESTATIONS, UNCONTROLLED(250.62) (HCC): ICD-10-CM

## 2020-11-30 DIAGNOSIS — E78.2 MIXED HYPERLIPIDEMIA: ICD-10-CM

## 2020-11-30 DIAGNOSIS — G62.9 NEUROPATHY: ICD-10-CM

## 2020-11-30 DIAGNOSIS — Z79.4 TYPE 2 DIABETES MELLITUS WITH HYPERGLYCEMIA, WITH LONG-TERM CURRENT USE OF INSULIN (HCC): ICD-10-CM

## 2020-11-30 DIAGNOSIS — E83.52 HYPERCALCEMIA: ICD-10-CM

## 2020-11-30 DIAGNOSIS — E55.9 VITAMIN D DEFICIENCY: ICD-10-CM

## 2020-11-30 DIAGNOSIS — E11.65 UNCONTROLLED TYPE 2 DIABETES MELLITUS WITH HYPERGLYCEMIA, WITH LONG-TERM CURRENT USE OF INSULIN (HCC): ICD-10-CM

## 2020-11-30 RX ORDER — GABAPENTIN 300 MG/1
CAPSULE ORAL
Qty: 180 CAP | Refills: 2 | Status: SHIPPED | OUTPATIENT
Start: 2020-11-30 | End: 2021-06-18 | Stop reason: SDUPTHER

## 2020-11-30 NOTE — TELEPHONE ENCOUNTER
----- Message from Derrell San sent at 11/27/2020  1:00 PM EST -----  Regarding: Dr. Molina Page (if not patient): Reny Nix      Relationship of caller (if not patient): Daughter      Best contact number(s): [phone]      Name of medication and dosage if known: Gabapentin 300 mg      Is patient out of this medication (yes/no): Yes, only 3 pills left      Pharmacy name:  [address] [place] [address] listed in chart? (yes/no): Yes  Pharmacy phone number:      Details to clarify the request:      Derrell San

## 2021-01-01 ENCOUNTER — OFFICE VISIT (OUTPATIENT)
Dept: ENDOCRINOLOGY | Age: 76
End: 2021-01-01
Payer: COMMERCIAL

## 2021-01-01 VITALS
BODY MASS INDEX: 31.93 KG/M2 | TEMPERATURE: 97.2 F | HEIGHT: 64 IN | DIASTOLIC BLOOD PRESSURE: 82 MMHG | RESPIRATION RATE: 20 BRPM | HEART RATE: 74 BPM | OXYGEN SATURATION: 97 % | SYSTOLIC BLOOD PRESSURE: 187 MMHG

## 2021-01-01 DIAGNOSIS — I73.9 PAD (PERIPHERAL ARTERY DISEASE) (HCC): ICD-10-CM

## 2021-01-01 DIAGNOSIS — Z79.4 TYPE 2 DIABETES MELLITUS WITH HYPERGLYCEMIA, WITH LONG-TERM CURRENT USE OF INSULIN (HCC): Primary | ICD-10-CM

## 2021-01-01 DIAGNOSIS — E55.9 VITAMIN D DEFICIENCY: ICD-10-CM

## 2021-01-01 DIAGNOSIS — E78.5 HYPERLIPIDEMIA, UNSPECIFIED HYPERLIPIDEMIA TYPE: ICD-10-CM

## 2021-01-01 DIAGNOSIS — N18.30 STAGE 3 CHRONIC KIDNEY DISEASE, UNSPECIFIED WHETHER STAGE 3A OR 3B CKD (HCC): ICD-10-CM

## 2021-01-01 DIAGNOSIS — E53.8 VITAMIN B12 DEFICIENCY: ICD-10-CM

## 2021-01-01 DIAGNOSIS — E11.65 TYPE 2 DIABETES MELLITUS WITH HYPERGLYCEMIA, WITH LONG-TERM CURRENT USE OF INSULIN (HCC): Primary | ICD-10-CM

## 2021-01-01 DIAGNOSIS — E11.49 TYPE II OR UNSPECIFIED TYPE DIABETES MELLITUS WITH NEUROLOGICAL MANIFESTATIONS, UNCONTROLLED(250.62) (HCC): ICD-10-CM

## 2021-01-01 DIAGNOSIS — E78.2 MIXED HYPERLIPIDEMIA: ICD-10-CM

## 2021-01-01 DIAGNOSIS — I10 ESSENTIAL HYPERTENSION: ICD-10-CM

## 2021-01-01 DIAGNOSIS — G62.9 NEUROPATHY: ICD-10-CM

## 2021-01-01 DIAGNOSIS — E11.65 TYPE 2 DIABETES MELLITUS WITH HYPERGLYCEMIA, WITH LONG-TERM CURRENT USE OF INSULIN (HCC): ICD-10-CM

## 2021-01-01 DIAGNOSIS — E11.65 TYPE 2 DIABETES MELLITUS WITH HYPERGLYCEMIA, UNSPECIFIED WHETHER LONG TERM INSULIN USE (HCC): ICD-10-CM

## 2021-01-01 DIAGNOSIS — Z79.4 UNCONTROLLED TYPE 2 DIABETES MELLITUS WITH HYPERGLYCEMIA, WITH LONG-TERM CURRENT USE OF INSULIN (HCC): ICD-10-CM

## 2021-01-01 DIAGNOSIS — E11.65 UNCONTROLLED TYPE 2 DIABETES MELLITUS WITH HYPERGLYCEMIA, WITH LONG-TERM CURRENT USE OF INSULIN (HCC): ICD-10-CM

## 2021-01-01 DIAGNOSIS — Z79.4 TYPE 2 DIABETES MELLITUS WITH HYPERGLYCEMIA, WITH LONG-TERM CURRENT USE OF INSULIN (HCC): ICD-10-CM

## 2021-01-01 DIAGNOSIS — E83.52 HYPERCALCEMIA: ICD-10-CM

## 2021-01-01 LAB
BUN SERPL-MCNC: 21 MG/DL (ref 8–27)
BUN/CREAT SERPL: 14 (ref 12–28)
CALCIUM SERPL-MCNC: 9.2 MG/DL (ref 8.7–10.3)
CHLORIDE SERPL-SCNC: 107 MMOL/L (ref 96–106)
CO2 SERPL-SCNC: 23 MMOL/L (ref 20–29)
CREAT SERPL-MCNC: 1.49 MG/DL (ref 0.57–1)
EST. AVERAGE GLUCOSE BLD GHB EST-MCNC: 154 MG/DL
GLUCOSE SERPL-MCNC: 261 MG/DL (ref 65–99)
HBA1C MFR BLD HPLC: 7.2 %
HBA1C MFR BLD: 7 % (ref 4.8–5.6)
INTERPRETATION: NORMAL
LDLC SERPL DIRECT ASSAY-MCNC: 79 MG/DL (ref 0–99)
POTASSIUM SERPL-SCNC: 4.6 MMOL/L (ref 3.5–5.2)
SODIUM SERPL-SCNC: 142 MMOL/L (ref 134–144)

## 2021-01-01 PROCEDURE — 99215 OFFICE O/P EST HI 40 MIN: CPT | Performed by: INTERNAL MEDICINE

## 2021-01-01 PROCEDURE — 83036 HEMOGLOBIN GLYCOSYLATED A1C: CPT | Performed by: INTERNAL MEDICINE

## 2021-01-01 PROCEDURE — 3051F HG A1C>EQUAL 7.0%<8.0%: CPT | Performed by: INTERNAL MEDICINE

## 2021-01-01 RX ORDER — BLOOD SUGAR DIAGNOSTIC
STRIP MISCELLANEOUS
Qty: 300 STRIP | Refills: 3 | Status: SHIPPED | OUTPATIENT
Start: 2021-01-01 | End: 2022-01-01

## 2021-01-01 RX ORDER — BLOOD-GLUCOSE METER
EACH MISCELLANEOUS
Qty: 1 EACH | Refills: 0 | Status: SHIPPED | OUTPATIENT
Start: 2021-01-01 | End: 2022-01-01

## 2021-01-01 RX ORDER — LANCETS 33 GAUGE
EACH MISCELLANEOUS
Qty: 300 LANCET | Refills: 3 | Status: SHIPPED | OUTPATIENT
Start: 2021-01-01 | End: 2022-01-01

## 2021-01-01 RX ORDER — GABAPENTIN 300 MG/1
CAPSULE ORAL
Qty: 180 CAPSULE | Refills: 2 | Status: SHIPPED | OUTPATIENT
Start: 2021-01-01 | End: 2022-01-01

## 2021-01-01 RX ORDER — PEN NEEDLE, DIABETIC 32GX 5/32"
NEEDLE, DISPOSABLE MISCELLANEOUS
Qty: 200 PEN NEEDLE | Refills: 4 | Status: SHIPPED | OUTPATIENT
Start: 2021-01-01 | End: 2022-01-01

## 2021-03-12 DIAGNOSIS — E11.49 TYPE II OR UNSPECIFIED TYPE DIABETES MELLITUS WITH NEUROLOGICAL MANIFESTATIONS, UNCONTROLLED(250.62) (HCC): ICD-10-CM

## 2021-03-12 DIAGNOSIS — E78.2 MIXED HYPERLIPIDEMIA: ICD-10-CM

## 2021-03-12 DIAGNOSIS — Z79.4 UNCONTROLLED TYPE 2 DIABETES MELLITUS WITH HYPERGLYCEMIA, WITH LONG-TERM CURRENT USE OF INSULIN (HCC): ICD-10-CM

## 2021-03-12 DIAGNOSIS — E83.52 HYPERCALCEMIA: ICD-10-CM

## 2021-03-12 DIAGNOSIS — I10 ESSENTIAL HYPERTENSION: ICD-10-CM

## 2021-03-12 DIAGNOSIS — G62.9 NEUROPATHY: ICD-10-CM

## 2021-03-12 DIAGNOSIS — E78.5 HYPERLIPIDEMIA, UNSPECIFIED HYPERLIPIDEMIA TYPE: ICD-10-CM

## 2021-03-12 DIAGNOSIS — Z79.4 TYPE 2 DIABETES MELLITUS WITH HYPERGLYCEMIA, WITH LONG-TERM CURRENT USE OF INSULIN (HCC): ICD-10-CM

## 2021-03-12 DIAGNOSIS — E55.9 VITAMIN D DEFICIENCY: ICD-10-CM

## 2021-03-12 DIAGNOSIS — E53.8 VITAMIN B12 DEFICIENCY: ICD-10-CM

## 2021-03-12 DIAGNOSIS — E11.65 UNCONTROLLED TYPE 2 DIABETES MELLITUS WITH HYPERGLYCEMIA, WITH LONG-TERM CURRENT USE OF INSULIN (HCC): ICD-10-CM

## 2021-03-12 DIAGNOSIS — I73.9 PAD (PERIPHERAL ARTERY DISEASE) (HCC): ICD-10-CM

## 2021-03-12 DIAGNOSIS — E11.65 TYPE 2 DIABETES MELLITUS WITH HYPERGLYCEMIA, WITH LONG-TERM CURRENT USE OF INSULIN (HCC): ICD-10-CM

## 2021-03-12 RX ORDER — ATORVASTATIN CALCIUM 20 MG/1
TABLET, FILM COATED ORAL
Qty: 30 TAB | Refills: 5 | Status: SHIPPED | OUTPATIENT
Start: 2021-03-12 | End: 2022-01-01

## 2021-03-16 DIAGNOSIS — E11.65 TYPE 2 DIABETES MELLITUS WITH HYPERGLYCEMIA, WITH LONG-TERM CURRENT USE OF INSULIN (HCC): Primary | ICD-10-CM

## 2021-03-16 DIAGNOSIS — Z79.4 TYPE 2 DIABETES MELLITUS WITH HYPERGLYCEMIA, WITH LONG-TERM CURRENT USE OF INSULIN (HCC): Primary | ICD-10-CM

## 2021-03-16 NOTE — TELEPHONE ENCOUNTER
----- Message from [address] [place] [address] sent at 3/16/2021  1:36 PM EDT -----  Regarding: Dr. Michael Byrne refill  Medication Refill    Caller (if not patient): Jess Reese      Relationship of caller (if not patient): daughter       Best contact number(s):[phone]      Name of medication and dosage if known: Victoza      Is patient out of this medication (yes/no): yes      Pharmacy name: [address] listed in chart? (yes/no): yes  Pharmacy phone [phone]      Details to clarify the request: Patient has been out of medication for three days now.       [address] [place] [address]

## 2021-03-17 ENCOUNTER — TRANSCRIBE ORDER (OUTPATIENT)
Dept: ENDOCRINOLOGY | Age: 76
End: 2021-03-17

## 2021-03-17 ENCOUNTER — TELEPHONE (OUTPATIENT)
Dept: ENDOCRINOLOGY | Age: 76
End: 2021-03-17

## 2021-03-17 RX ORDER — LIRAGLUTIDE 6 MG/ML
INJECTION SUBCUTANEOUS
Qty: 27 ML | Refills: 0 | Status: SHIPPED | OUTPATIENT
Start: 2021-03-17 | End: 2021-06-18 | Stop reason: SDUPTHER

## 2021-03-17 NOTE — TELEPHONE ENCOUNTER
----- Message from Cristian Trevino sent at 3/17/2021 12:41 PM EDT -----  Regarding: Dr. Palma George General Message/Vendor Calls    Caller's first and last name: Sridevi Dupree (daughter)      Reason for call: Regarding [address] ([phone]) denied Victoza 3-Sheldon 0.6 mg/0.1 mL (18 mg/3 mL) pnij. Stated pt has an apt 06/18/2021.       Call back required yes/no and why: Yes       Best contact number(s): [phone]      Details to clarify the request:      Cristian Trevino

## 2021-03-17 NOTE — TELEPHONE ENCOUNTER
Informed pt's daughter darleen Reyes was sent in to last pt until her f/u appt in June. She verbalized understanding with no further questions or concerns at this time.

## 2021-03-18 ENCOUNTER — TELEPHONE (OUTPATIENT)
Dept: ENDOCRINOLOGY | Age: 76
End: 2021-03-18

## 2021-03-18 NOTE — TELEPHONE ENCOUNTER
Daughter returned call. Informed her of the above. She verbalized understanding with no further questions or concerns at this time.

## 2021-03-18 NOTE — TELEPHONE ENCOUNTER
----- Message from Tahmina Hand sent at 3/18/2021 12:58 PM EDT -----  Regarding: Dr. Jim Mejia first and last name: N/A   Reason for call: Questions   Best contact number(s): [phone]  Details to clarify the request: Caller stated that she was told that pt medication \" Victozia\" was approved and will be sent to the [facility]'s pharmacy. Caller stated that the medication wasn't sent  and [facility]'s advised caller to call the practice. Caller would like a call back as soon as possible. Caller didn't know the dosage of the medication.

## 2021-03-18 NOTE — TELEPHONE ENCOUNTER
----- Message from Veda Rutherford sent at 3/18/2021 12:58 PM EDT -----  Regarding: Dr. Kriss Schmitt first and last name: N/A   Reason for call: Questions   Best contact number(s): [phone]  Details to clarify the request: Caller stated that she was told that pt medication \" Victozia\" was approved and will be sent to the [facility]'s pharmacy. Caller stated that the medication wasn't sent  and [facility]'s advised caller to call the practice. Caller would like a call back as soon as possible. Caller didn't know the dosage of the medication.

## 2021-03-18 NOTE — TELEPHONE ENCOUNTER
Called [facility]. Pharmacist confirmed that they did not receive rx that was sent yesterday. Gave verbal per yesterdays order. He verbalized understanding with no further questions or concerns at this time. Attempted to call pt. Pt unavailable.

## 2021-05-11 DIAGNOSIS — I10 ESSENTIAL HYPERTENSION: ICD-10-CM

## 2021-05-11 DIAGNOSIS — E78.2 MIXED HYPERLIPIDEMIA: ICD-10-CM

## 2021-05-11 DIAGNOSIS — I73.9 PAD (PERIPHERAL ARTERY DISEASE) (HCC): ICD-10-CM

## 2021-05-11 RX ORDER — INSULIN GLARGINE 100 [IU]/ML
22 INJECTION, SOLUTION SUBCUTANEOUS DAILY
Qty: 15 ML | Refills: 0 | Status: SHIPPED | OUTPATIENT
Start: 2021-05-11 | End: 2021-06-18 | Stop reason: SDUPTHER

## 2021-05-20 DIAGNOSIS — I10 ESSENTIAL HYPERTENSION: ICD-10-CM

## 2021-05-20 DIAGNOSIS — E78.5 HYPERLIPIDEMIA, UNSPECIFIED HYPERLIPIDEMIA TYPE: ICD-10-CM

## 2021-05-20 DIAGNOSIS — E11.65 TYPE 2 DIABETES MELLITUS WITH HYPERGLYCEMIA, WITH LONG-TERM CURRENT USE OF INSULIN (HCC): ICD-10-CM

## 2021-05-20 DIAGNOSIS — E11.49 TYPE II OR UNSPECIFIED TYPE DIABETES MELLITUS WITH NEUROLOGICAL MANIFESTATIONS, UNCONTROLLED(250.62) (HCC): ICD-10-CM

## 2021-05-20 DIAGNOSIS — E11.65 UNCONTROLLED TYPE 2 DIABETES MELLITUS WITH HYPERGLYCEMIA, WITH LONG-TERM CURRENT USE OF INSULIN (HCC): ICD-10-CM

## 2021-05-20 DIAGNOSIS — I73.9 PAD (PERIPHERAL ARTERY DISEASE) (HCC): ICD-10-CM

## 2021-05-20 DIAGNOSIS — E55.9 VITAMIN D DEFICIENCY: ICD-10-CM

## 2021-05-20 DIAGNOSIS — E53.8 VITAMIN B12 DEFICIENCY: ICD-10-CM

## 2021-05-20 DIAGNOSIS — E83.52 HYPERCALCEMIA: ICD-10-CM

## 2021-05-20 DIAGNOSIS — G62.9 NEUROPATHY: ICD-10-CM

## 2021-05-20 DIAGNOSIS — E78.2 MIXED HYPERLIPIDEMIA: ICD-10-CM

## 2021-05-20 DIAGNOSIS — Z79.4 TYPE 2 DIABETES MELLITUS WITH HYPERGLYCEMIA, WITH LONG-TERM CURRENT USE OF INSULIN (HCC): ICD-10-CM

## 2021-05-20 DIAGNOSIS — Z79.4 UNCONTROLLED TYPE 2 DIABETES MELLITUS WITH HYPERGLYCEMIA, WITH LONG-TERM CURRENT USE OF INSULIN (HCC): ICD-10-CM

## 2021-05-20 RX ORDER — GABAPENTIN 300 MG/1
CAPSULE ORAL
Qty: 180 CAPSULE | Refills: 0 | Status: CANCELLED | OUTPATIENT
Start: 2021-05-20

## 2021-05-24 NOTE — TELEPHONE ENCOUNTER
Daughter checking as to why gabapentin has not yet been filled.  I explained she may need to wait until upcoming apt due to documentation of controlled substance but would have the nurse to reach out and let her know if oversight or something else

## 2021-05-24 NOTE — TELEPHONE ENCOUNTER
she has been seen more than a year ago, cannot refill until she is seen as it is a controlled substance.

## 2021-05-25 NOTE — TELEPHONE ENCOUNTER
Informed Ms Jamar Enriquez, on HIPAA, of Dr. Alaina Goyal note. She stated pt has an appt coming up in June. Informed her that until she is seen Dr. Wyatt Cox can't send in the rx. Advised her to check with PCP as she has seen him within the last month. She verbalized understanding with no further questions or concerns at this time.

## 2021-06-18 ENCOUNTER — OFFICE VISIT (OUTPATIENT)
Dept: ENDOCRINOLOGY | Age: 76
End: 2021-06-18
Payer: COMMERCIAL

## 2021-06-18 VITALS
SYSTOLIC BLOOD PRESSURE: 201 MMHG | BODY MASS INDEX: 31.93 KG/M2 | HEIGHT: 64 IN | HEART RATE: 76 BPM | OXYGEN SATURATION: 94 % | TEMPERATURE: 98.3 F | DIASTOLIC BLOOD PRESSURE: 86 MMHG

## 2021-06-18 DIAGNOSIS — E11.65 TYPE 2 DIABETES MELLITUS WITH HYPERGLYCEMIA, WITH LONG-TERM CURRENT USE OF INSULIN (HCC): Primary | ICD-10-CM

## 2021-06-18 DIAGNOSIS — E11.65 TYPE 2 DIABETES MELLITUS WITH HYPERGLYCEMIA, WITH LONG-TERM CURRENT USE OF INSULIN (HCC): ICD-10-CM

## 2021-06-18 DIAGNOSIS — E78.2 MIXED HYPERLIPIDEMIA: ICD-10-CM

## 2021-06-18 DIAGNOSIS — Z79.4 UNCONTROLLED TYPE 2 DIABETES MELLITUS WITH HYPERGLYCEMIA, WITH LONG-TERM CURRENT USE OF INSULIN (HCC): ICD-10-CM

## 2021-06-18 DIAGNOSIS — G62.9 NEUROPATHY: ICD-10-CM

## 2021-06-18 DIAGNOSIS — E55.9 VITAMIN D DEFICIENCY: ICD-10-CM

## 2021-06-18 DIAGNOSIS — E11.65 UNCONTROLLED TYPE 2 DIABETES MELLITUS WITH HYPERGLYCEMIA, WITH LONG-TERM CURRENT USE OF INSULIN (HCC): ICD-10-CM

## 2021-06-18 DIAGNOSIS — I73.9 PAD (PERIPHERAL ARTERY DISEASE) (HCC): ICD-10-CM

## 2021-06-18 DIAGNOSIS — E78.5 HYPERLIPIDEMIA, UNSPECIFIED HYPERLIPIDEMIA TYPE: ICD-10-CM

## 2021-06-18 DIAGNOSIS — Z79.4 TYPE 2 DIABETES MELLITUS WITH HYPERGLYCEMIA, WITH LONG-TERM CURRENT USE OF INSULIN (HCC): ICD-10-CM

## 2021-06-18 DIAGNOSIS — I10 ESSENTIAL HYPERTENSION: ICD-10-CM

## 2021-06-18 DIAGNOSIS — E83.52 HYPERCALCEMIA: ICD-10-CM

## 2021-06-18 DIAGNOSIS — E53.8 VITAMIN B12 DEFICIENCY: ICD-10-CM

## 2021-06-18 DIAGNOSIS — E11.49 TYPE II OR UNSPECIFIED TYPE DIABETES MELLITUS WITH NEUROLOGICAL MANIFESTATIONS, UNCONTROLLED(250.62) (HCC): ICD-10-CM

## 2021-06-18 DIAGNOSIS — Z79.4 TYPE 2 DIABETES MELLITUS WITH HYPERGLYCEMIA, WITH LONG-TERM CURRENT USE OF INSULIN (HCC): Primary | ICD-10-CM

## 2021-06-18 LAB — HBA1C MFR BLD HPLC: 7.3 %

## 2021-06-18 PROCEDURE — 99214 OFFICE O/P EST MOD 30 MIN: CPT | Performed by: INTERNAL MEDICINE

## 2021-06-18 PROCEDURE — 83036 HEMOGLOBIN GLYCOSYLATED A1C: CPT | Performed by: INTERNAL MEDICINE

## 2021-06-18 PROCEDURE — 3051F HG A1C>EQUAL 7.0%<8.0%: CPT | Performed by: INTERNAL MEDICINE

## 2021-06-18 RX ORDER — CALCIUM CITRATE/VITAMIN D3 200MG-6.25
TABLET ORAL
Qty: 100 STRIP | Refills: 11 | Status: SHIPPED | OUTPATIENT
Start: 2021-06-18 | End: 2021-01-01 | Stop reason: CLARIF

## 2021-06-18 RX ORDER — LANCETS 33 GAUGE
EACH MISCELLANEOUS
Qty: 300 LANCET | Refills: 11 | Status: SHIPPED | OUTPATIENT
Start: 2021-06-18 | End: 2021-01-01 | Stop reason: CLARIF

## 2021-06-18 RX ORDER — LIRAGLUTIDE 6 MG/ML
INJECTION SUBCUTANEOUS
Qty: 27 ML | Refills: 3 | Status: SHIPPED | OUTPATIENT
Start: 2021-06-18 | End: 2022-01-01

## 2021-06-18 RX ORDER — INSULIN GLARGINE 100 [IU]/ML
22 INJECTION, SOLUTION SUBCUTANEOUS DAILY
Qty: 30 ML | Refills: 2 | Status: SHIPPED | OUTPATIENT
Start: 2021-06-18 | End: 2022-01-01

## 2021-06-18 RX ORDER — GABAPENTIN 300 MG/1
CAPSULE ORAL
Qty: 180 CAPSULE | Refills: 2 | Status: SHIPPED | OUTPATIENT
Start: 2021-06-18 | End: 2021-01-01 | Stop reason: SDUPTHER

## 2021-06-18 NOTE — LETTER
6/19/2021 Patient: Jn Do YOB: 1945 Date of Visit: 6/18/2021 Graciela Faria MD 
[address] [facility] [address] [address] Fax: [phone] Dear Graciela Faria MD, Thank you for referring Ms. Jaydon Velasquez to [id number] [place] [address] for evaluation. My notes for this consultation are attached. If you have questions, please do not hesitate to call me. I look forward to following your patient along with you. Sincerely, Geronimo Gomez MD

## 2021-06-18 NOTE — PROGRESS NOTES
[facility]               Francois Stovall MD       Arnold Payan  1945        Chief Complaint   Patient presents with    Diabetes       History of Present Illness: Arnold Payan is a 78 y.o. female here for fu  of  Type 2 Diabetes Mellitus. Longstanding history of type 2 diabetes mellitus, on MDI, complicated by PAD, peripheral neuropathy  Seen more than a year ago  No labs  She is on Victoza and insulin, did not bring the meter or the logbook  She is seeing vascular surgeon for PAD    Has severe DJD,    Reports pain in the upper extremity, weakness  January 2018: Had cervical fusion    PAD PCI , stenting Seen by Vascular surgeon, at [address] [place] [address] at [address]  - Dr Humberto Aguilar             Type 2 Diabetes was diagnosed 30 yrs ago. End organ effects of diabetes: peripheral neuropathy. Cardiovascular risk factors: dyslipidemia, diabetes mellitus, obesity, sedentary life style, hypertension       Wt Readings from Last 3 Encounters:   07/21/20 186 lb (84.4 kg)   02/17/20 187 lb (84.8 kg)   02/17/20 187 lb (84.8 kg)       BP Readings from Last 3 Encounters:   06/18/21 (!) 201/86   07/21/20 128/78   02/17/20 160/86           Past Medical History:   Diagnosis Date    Diabetes mellitus ([facility] [address]     HTN (hypertension)     Hyperlipidemia     Neuropathy     PAD (peripheral artery disease) (HCC)     PCI    Sciatica      Current Outpatient Medications   Medication Sig    insulin glargine (Lantus Solostar U-100 Insulin) 100 unit/mL (3 mL) inpn 22 Units by SubCUTAneous route daily. NO FURTHER REFILLS UNTIL SEEN IN OFFICE    liraglutide (Victoza 3-Sheldon) 0.6 mg/0.1 mL (18 mg/3 mL) pnij ADMINISTER 1.8 MG UNDER THE SKIN DAILY.  No further refills until seen in office    atorvastatin (LIPITOR) 20 mg tablet TAKE 1 TABLET BY MOUTH EVERY NIGHT    gabapentin (NEURONTIN) 300 mg capsule TAKE 2 CAPSULES BY MOUTH THREE TIMES DAILY WITH MEALS    dilTIAZem ER (CARDIZEM CD) 180 mg capsule TAKE 1 CAPSULE BY MOUTH DAILY    brimonidine (ALPHAGAN) 0.2 % ophthalmic solution Administer 1 Drop to both eyes three (3) times daily.  aspirin delayed-release 81 mg tablet Take  by mouth daily.  Insulin Needles, Disposable, (Chely Pen Needle) 32 gauge x 5/32\" ndle USE TO INJECT LANTUS AND VICTOZA DAILY    metFORMIN (GLUCOPHAGE) 1,000 mg tablet TAKE 1/2 TABLET BY MOUTH TWICE DAILY WITH MEALS    metFORMIN ER (GLUCOPHAGE XR) 500 mg tablet Take 1 Tab by mouth two (2) times a day.  losartan (COZAAR) 50 mg tablet TK 2 TS PO QD IN THE EVENING    VICTOZA 3-BANDAR 0.6 mg/0.1 mL (18 mg/3 mL) pnij ADMINISTER 1.8 MG UNDER THE SKIN EVERY MORNING    clopidogrel (PLAVIX) 75 mg tab Take 75 mg by mouth daily.  TRUEPLUS LANCETS 33 gauge misc USE TO TEST BLOOD SUGAR THREE TIMES DAILY    glucose blood VI test strips (TRUE METRIX GLUCOSE TEST STRIP) strip Test blood glucose 3 time daily Dx Code: E11.65    Blood-Glucose Meter (TRUE METRIX AIR GLUCOSE METER) monitoring kit Test blood glucose 3 time daily Dx Code: E11.65    CHELY PEN NEEDLE 32 gauge x 5/32\" ndle USE TO INJECT LANTUS AND VICTOZA EVERY DAY    ferrous sulfate 325 mg (65 mg iron) tablet Take 1 Tab by mouth two (2) times a day.  polyethylene glycol (MIRALAX) 17 gram packet Take 1 Packet by mouth daily.  oxyCODONE IR (ROXICODONE) 5 mg immediate release tablet Take 5 mg by mouth every six (6) hours as needed for Pain.  HYDROcodone-acetaminophen (NORCO) 5-325 mg per tablet Take  by mouth. No current facility-administered medications for this visit. No Known Allergies      Review of Systems:  - Per HPI  -     Physical Examination:   Blood pressure (!) 201/86, pulse 76, temperature 98.3 °F (36.8 °C), height 5' 4\" (1.626 m), SpO2 94 %. Estimated body mass index is 31.93 kg/m² as calculated from the following:    Height as of this encounter: 5' 4\" (1.626 m). -   Weight as of 7/21/20: 186 lb (84.4 kg).   - General: pleasant, no distress, good eye contact  - HEENT: no pallor, no periorbital edema, EOMI  - Neck: supple  - Cardiovascular: regular, normal S1 and S2  - Respiratory: clear to auscultation bilaterally  -   Ext - toe amputation ,  - Neurological: alert and oriented  - Psychiatric: normal mood and affect  - Skin: color, texture, turgor normal.      history of foot ulcers, callus, toe amputation                Lab Results   Component Value Date/Time    Hemoglobin A1c 7.3 (H) 09/25/2019 02:22 PM    Hemoglobin A1c 7.4 (H) 04/03/2019 10:19 AM    Hemoglobin A1c 6.9 (H) 06/30/2017 02:00 PM    Microalb/Creat ratio (ug/mg creat.) 641.5 (H) 04/08/2019 03:56 PM    LDL, calculated 135 (H) 09/25/2019 02:22 PM    Creatinine 1.32 (H) 09/25/2019 02:22 PM      Lab Results   Component Value Date/Time    GFR est AA 46 (L) 09/25/2019 02:22 PM    GFR est non-AA 40 (L) 09/25/2019 02:22 PM    Creatinine 1.32 (H) 09/25/2019 02:22 PM    BUN 22 09/25/2019 02:22 PM    Sodium 144 09/25/2019 02:22 PM    Potassium 4.6 09/25/2019 02:22 PM    Chloride 105 09/25/2019 02:22 PM    CO2 22 09/25/2019 02:22 PM          Lab Results   Component Value Date/Time    Hemoglobin A1c 7.3 (H) 09/25/2019 02:22 PM    Hemoglobin A1c 7.4 (H) 04/03/2019 10:19 AM    Hemoglobin A1c 6.9 (H) 06/30/2017 02:00 PM    Microalb/Creat ratio (ug/mg creat.) 641.5 (H) 04/08/2019 03:56 PM    LDL, calculated 135 (H) 09/25/2019 02:22 PM    Creatinine 1.32 (H) 09/25/2019 02:22 PM          Assessment/Plan:       1. Type 2 Diabetes Mellitus with neuropathy,nephropathy, PAD  Lab Results   Component Value Date/Time    Hemoglobin A1c 7.3 (H) 09/25/2019 02:22 PM    Hemoglobin A1c (POC) 7.3 06/18/2021 03:56 PM   Limited activity  lantus 22 units   She will continue metformin 500 mg BID, need labs stressed the importance of regular follow-up and lab monitoring  Victoza  1.8 mg daily . SMBG 2 x day, to bring log  FLU annually ,Pneumovax ,aspirin daily,annual eye exam,microalbumin.     2.  HTN: Systolic very high, home monitoring good, asymptomatic, home monitoring of blood pressure. She is angry that she had to come to the appointment today to get gabapentin and other medications refilled, she has not been seen in over a year, labs were in 2019 which was almost 2 years ago  Discussed the reasoning     3. Hyperlipidemia : Continue statin. 4. Peripheral neuropathy -Had PCI ,stent right LE  Metanx did not help  Muscle weakness, wasting  Physical therapy did not help according to her and she was in the rehab      5 PAD -PCI - foot ulcers  March 19th 2016 she had toe right  amputation, followed by wound clinic    6 Hypercalcemia - resolved     Cervical radiculopathy: Status post fusion, has persistent pain and weakness  Exercises  To follow-up with neurosurgery    Thank you for allowing me to participate in the care of this patient.     Woody Arenas MD      Patient verbalized understanding

## 2021-11-08 NOTE — PROGRESS NOTES
[facility]               Ramana Garcias MD       Rocio Jeronimo  1945        Chief Complaint   Patient presents with    Diabetes       History of Present Illness: Rocio Jeronimo is a 78 y.o. female here for fu  of  Type 2 Diabetes Mellitus. Longstanding history of type 2 diabetes mellitus, on MDI, complicated by PAD, peripheral neuropathy  Limited activity, in a wheelchair, complains of pain in the feet, feels hot  Did not bring the meter or the logbook, reports that the blood glucose has been controlled  She is on Victoza and insulin,  She is seeing vascular surgeon for PAD    Has severe DJD,    Reports pain in the upper extremity, weakness  January 2018: Had cervical fusion    PAD PCI , stenting Seen by Vascular surgeon, at [facility] at [address]  - Dr Nadyne Fabry             Type 2 Diabetes was diagnosed 30 yrs ago. End organ effects of diabetes: peripheral neuropathy.     Cardiovascular risk factors: dyslipidemia, diabetes mellitus, obesity, sedentary life style, hypertension       Wt Readings from Last 3 Encounters:   07/21/20 186 lb (84.4 kg)   02/17/20 187 lb (84.8 kg)   02/17/20 187 lb (84.8 kg)       BP Readings from Last 3 Encounters:   11/08/21 (!) 187/82   06/18/21 (!) 201/86   07/21/20 128/78           Past Medical History:   Diagnosis Date    Diabetes mellitus ([facility] [address]     HTN (hypertension)     Hyperlipidemia     Neuropathy     PAD (peripheral artery disease) (HCC)     PCI    Sciatica      Current Outpatient Medications   Medication Sig    Blood-Glucose Meter (OneTouch Verio Flex meter) misc Test blood glucose 3 time daily Dx Code: E11.65    glucose blood VI test strips (OneTouch Verio test strips) strip Test blood glucose 3 time daily Dx Code: E11.65    lancets (OneTouch Delica Plus Lancet) 33 gauge misc Test blood glucose 3 time daily Dx Code: E11.65    BD [place] 2nd Gen Pen Needle 32 gauge x 5/32\" ndle USE TO INJECT LANTUS AND VICTOZA DAILY    gabapentin (NEURONTIN) 300 mg capsule TAKE 2 CAPSULES BY MOUTH THREE TIMES DAILY WITH MEALS    insulin glargine (Lantus Solostar U-100 Insulin) 100 unit/mL (3 mL) inpn 22 Units by SubCUTAneous route daily.  liraglutide (Victoza 3-Bandar) 0.6 mg/0.1 mL (18 mg/3 mL) pnij ADMINISTER 1.8 MG UNDER THE SKIN DAILY.  atorvastatin (LIPITOR) 20 mg tablet TAKE 1 TABLET BY MOUTH EVERY NIGHT    dilTIAZem ER (CARDIZEM CD) 180 mg capsule TAKE 1 CAPSULE BY MOUTH DAILY    brimonidine (ALPHAGAN) 0.2 % ophthalmic solution Administer 1 Drop to both eyes three (3) times daily.  aspirin delayed-release 81 mg tablet Take  by mouth daily.  metFORMIN (GLUCOPHAGE) 1,000 mg tablet TAKE 1/2 TABLET BY MOUTH TWICE DAILY WITH MEALS    losartan (COZAAR) 50 mg tablet TK 2 TS PO QD IN THE EVENING    VICTOZA 3-BANDAR 0.6 mg/0.1 mL (18 mg/3 mL) pnij ADMINISTER 1.8 MG UNDER THE SKIN EVERY MORNING    clopidogrel (PLAVIX) 75 mg tab Take 75 mg by mouth daily.  CHELY PEN NEEDLE 32 gauge x 5/32\" ndle USE TO INJECT LANTUS AND VICTOZA EVERY DAY    ferrous sulfate 325 mg (65 mg iron) tablet Take 1 Tab by mouth two (2) times a day.  polyethylene glycol (MIRALAX) 17 gram packet Take 1 Packet by mouth daily.  oxyCODONE IR (ROXICODONE) 5 mg immediate release tablet Take 5 mg by mouth every six (6) hours as needed for Pain.  HYDROcodone-acetaminophen (NORCO) 5-325 mg per tablet Take  by mouth. No current facility-administered medications for this visit. No Known Allergies      Review of Systems:  - Per HPI  -     Physical Examination:   Blood pressure (!) 187/82, pulse 74, temperature 97.2 °F (36.2 °C), temperature source Temporal, resp. rate 20, height 5' 4\" (1.626 m), SpO2 97 %. Estimated body mass index is 31.93 kg/m² as calculated from the following:    Height as of this encounter: 5' 4\" (1.626 m). -   Weight as of 7/21/20: 186 lb (84.4 kg).   - General: pleasant, no distress, good eye contact  - HEENT: no pallor, no periorbital edema, EOMI  - Neck: supple  - Cardiovascular: regular, normal S1 and S2  - Respiratory: clear to auscultation bilaterally  -     - Neurological: alert and oriented  - Psychiatric: normal mood and affect  - Skin: color, texture, turgor normal.       Diabetic foot exam ; November 2021    H/o partial or complete amputation of foot : Toe amputation right foot  H/o previous foot ulceration : Yes  H/o pre - ulcerative callus : Yes  H/o peripheral neuropathy and callus : Yes  H/o poor circulation  : Yes  Foot deformity : Yes    Cold feet            Lab Results   Component Value Date/Time    Hemoglobin A1c 7.0 (H) 06/28/2021 12:00 AM    Hemoglobin A1c 7.3 (H) 09/25/2019 02:22 PM    Hemoglobin A1c 7.4 (H) 04/03/2019 10:19 AM    Microalb/Creat ratio (ug/mg creat.) 641.5 (H) 04/08/2019 03:56 PM    LDL,Direct 79 06/28/2021 12:00 AM    LDL, calculated 135 (H) 09/25/2019 02:22 PM    Creatinine 1.49 (H) 06/28/2021 12:00 AM      Lab Results   Component Value Date/Time    GFR est AA 40 (L) 06/28/2021 12:00 AM    GFR est non-AA 34 (L) 06/28/2021 12:00 AM    Creatinine 1.49 (H) 06/28/2021 12:00 AM    BUN 21 06/28/2021 12:00 AM    Sodium 142 06/28/2021 12:00 AM    Potassium 4.6 06/28/2021 12:00 AM    Chloride 107 (H) 06/28/2021 12:00 AM    CO2 23 06/28/2021 12:00 AM          Lab Results   Component Value Date/Time    Hemoglobin A1c 7.0 (H) 06/28/2021 12:00 AM    Hemoglobin A1c 7.3 (H) 09/25/2019 02:22 PM    Hemoglobin A1c 7.4 (H) 04/03/2019 10:19 AM    Microalb/Creat ratio (ug/mg creat.) 641.5 (H) 04/08/2019 03:56 PM    LDL,Direct 79 06/28/2021 12:00 AM    LDL, calculated 135 (H) 09/25/2019 02:22 PM    Creatinine 1.49 (H) 06/28/2021 12:00 AM          Assessment/Plan:       1.  Type 2 Diabetes Mellitus with neuropathy,nephropathy, PAD  Lab Results   Component Value Date/Time    Hemoglobin A1c 7.0 (H) 06/28/2021 12:00 AM    Hemoglobin A1c (POC) 7.3 06/18/2021 03:56 PM   Limited activity, uncontrolled, A1c is increasing  lantus 22 units She will continue metformin 500 mg BID, will discontinue metformin if GFR is persistently less than 30  Victoza  1.8 mg daily . SMBG 2 x day, to bring log  FLU annually ,Pneumovax ,aspirin daily,annual eye exam,microalbumin. 2.  HTN: Blood pressure is elevated, compliance with medications     3. Hyperlipidemia : Continue statin. 4. Peripheral neuropathy -Had PCI ,stent right LE  Metanx did not help  Muscle weakness, wasting  Physical therapy did not help according to her and she was in the rehab      5 PAD -PCI -  March 19th 2016 she had toe right  Amputation,  S/p stent  If she has worsening pain discussed may be it is worthwhile getting ERLINDA or she could discuss with the vascular surgeon. 6 Hypercalcemia - resolved     Cervical radiculopathy: Status post fusion, has persistent pain and weakness  Exercises      Spent > 40 minutes on the day of the visit reviewing chart, examining, ordering/reviewing labs, counseling, discussing therapeutics and documentation in the medical record        Thank you for allowing me to participate in the care of this patient.     Gabrielle Villanueva MD      Patient verbalized understanding

## 2021-11-08 NOTE — LETTER
11/8/2021    Patient: Joseph Sanford   YOB: 1945   Date of Visit: 11/8/2021     Forrest Durbin MD  [address] [facility] [address]  Via Fax: [phone]    Dear Forrest Durbin MD,      Thank you for referring Ms. Mackenzie Camp to [address] [place] [address] for evaluation. My notes for this consultation are attached. If you have questions, please do not hesitate to call me. I look forward to following your patient along with you.       Sincerely,    Devante Roberson MD

## 2021-11-08 NOTE — PROGRESS NOTES
Yuliana Bynum is a 78 y.o. female here for   Chief Complaint   Patient presents with    Diabetes       1. Have you been to the ER, urgent care clinic since your last visit? Hospitalized since your last visit? -no    2. Have you seen or consulted any other health care providers outside of the [address] [place] [address] since your last visit? Include any pap smears or colon screening. -PCP

## 2022-01-01 ENCOUNTER — APPOINTMENT (OUTPATIENT)
Dept: CT IMAGING | Age: 77
DRG: 981 | End: 2022-01-01
Attending: INTERNAL MEDICINE
Payer: MEDICARE

## 2022-01-01 ENCOUNTER — TELEPHONE (OUTPATIENT)
Dept: INFECTIOUS DISEASES | Age: 77
End: 2022-01-01

## 2022-01-01 ENCOUNTER — APPOINTMENT (OUTPATIENT)
Dept: GENERAL RADIOLOGY | Age: 77
DRG: 853 | End: 2022-01-01
Attending: INTERNAL MEDICINE
Payer: MEDICARE

## 2022-01-01 ENCOUNTER — ANESTHESIA EVENT (OUTPATIENT)
Dept: ENDOSCOPY | Age: 77
DRG: 981 | End: 2022-01-01
Payer: MEDICARE

## 2022-01-01 ENCOUNTER — APPOINTMENT (OUTPATIENT)
Dept: CT IMAGING | Age: 77
DRG: 981 | End: 2022-01-01
Attending: PSYCHIATRY & NEUROLOGY
Payer: MEDICARE

## 2022-01-01 ENCOUNTER — APPOINTMENT (OUTPATIENT)
Dept: INTERVENTIONAL RADIOLOGY/VASCULAR | Age: 77
DRG: 853 | End: 2022-01-01
Attending: FAMILY MEDICINE
Payer: MEDICARE

## 2022-01-01 ENCOUNTER — ANESTHESIA EVENT (OUTPATIENT)
Dept: SURGERY | Age: 77
DRG: 853 | End: 2022-01-01
Payer: MEDICARE

## 2022-01-01 ENCOUNTER — APPOINTMENT (OUTPATIENT)
Dept: GENERAL RADIOLOGY | Age: 77
DRG: 853 | End: 2022-01-01
Attending: STUDENT IN AN ORGANIZED HEALTH CARE EDUCATION/TRAINING PROGRAM
Payer: MEDICARE

## 2022-01-01 ENCOUNTER — ANESTHESIA EVENT (OUTPATIENT)
Dept: ENDOSCOPY | Age: 77
DRG: 853 | End: 2022-01-01
Payer: MEDICARE

## 2022-01-01 ENCOUNTER — APPOINTMENT (OUTPATIENT)
Dept: GENERAL RADIOLOGY | Age: 77
DRG: 853 | End: 2022-01-01
Attending: FAMILY MEDICINE
Payer: MEDICARE

## 2022-01-01 ENCOUNTER — ANESTHESIA (OUTPATIENT)
Dept: SURGERY | Age: 77
DRG: 853 | End: 2022-01-01
Payer: MEDICARE

## 2022-01-01 ENCOUNTER — HOME CARE VISIT (OUTPATIENT)
Dept: HOSPICE | Facility: HOSPICE | Age: 77
End: 2022-01-01
Payer: MEDICARE

## 2022-01-01 ENCOUNTER — APPOINTMENT (OUTPATIENT)
Dept: ENDOSCOPY | Age: 77
DRG: 853 | End: 2022-01-01
Attending: INTERNAL MEDICINE
Payer: MEDICARE

## 2022-01-01 ENCOUNTER — APPOINTMENT (OUTPATIENT)
Dept: NON INVASIVE DIAGNOSTICS | Age: 77
DRG: 981 | End: 2022-01-01
Attending: PODIATRIST
Payer: MEDICARE

## 2022-01-01 ENCOUNTER — APPOINTMENT (OUTPATIENT)
Dept: GENERAL RADIOLOGY | Age: 77
DRG: 981 | End: 2022-01-01
Attending: PODIATRIST
Payer: MEDICARE

## 2022-01-01 ENCOUNTER — APPOINTMENT (OUTPATIENT)
Dept: GENERAL RADIOLOGY | Age: 77
DRG: 853 | End: 2022-01-01
Attending: NURSE PRACTITIONER
Payer: MEDICARE

## 2022-01-01 ENCOUNTER — APPOINTMENT (OUTPATIENT)
Dept: ENDOSCOPY | Age: 77
DRG: 981 | End: 2022-01-01
Attending: INTERNAL MEDICINE
Payer: MEDICARE

## 2022-01-01 ENCOUNTER — APPOINTMENT (OUTPATIENT)
Dept: GENERAL RADIOLOGY | Age: 77
DRG: 981 | End: 2022-01-01
Attending: INTERNAL MEDICINE
Payer: MEDICARE

## 2022-01-01 ENCOUNTER — APPOINTMENT (OUTPATIENT)
Dept: MRI IMAGING | Age: 77
DRG: 981 | End: 2022-01-01
Attending: PSYCHIATRY & NEUROLOGY
Payer: MEDICARE

## 2022-01-01 ENCOUNTER — APPOINTMENT (OUTPATIENT)
Dept: NON INVASIVE DIAGNOSTICS | Age: 77
DRG: 853 | End: 2022-01-01
Attending: FAMILY MEDICINE
Payer: MEDICARE

## 2022-01-01 ENCOUNTER — HOSPITAL ENCOUNTER (INPATIENT)
Age: 77
LOS: 30 days | Discharge: NURSING, REHAB OR LONG-TERM CARE | DRG: 853 | End: 2022-05-04
Attending: STUDENT IN AN ORGANIZED HEALTH CARE EDUCATION/TRAINING PROGRAM | Admitting: FAMILY MEDICINE
Payer: MEDICARE

## 2022-01-01 ENCOUNTER — APPOINTMENT (OUTPATIENT)
Dept: GENERAL RADIOLOGY | Age: 77
DRG: 853 | End: 2022-01-01
Attending: EMERGENCY MEDICINE
Payer: MEDICARE

## 2022-01-01 ENCOUNTER — APPOINTMENT (OUTPATIENT)
Dept: GENERAL RADIOLOGY | Age: 77
DRG: 853 | End: 2022-01-01
Attending: ANESTHESIOLOGY
Payer: MEDICARE

## 2022-01-01 ENCOUNTER — APPOINTMENT (OUTPATIENT)
Dept: NON INVASIVE DIAGNOSTICS | Age: 77
DRG: 981 | End: 2022-01-01
Attending: NURSE PRACTITIONER
Payer: MEDICARE

## 2022-01-01 ENCOUNTER — APPOINTMENT (OUTPATIENT)
Dept: CT IMAGING | Age: 77
DRG: 853 | End: 2022-01-01
Attending: STUDENT IN AN ORGANIZED HEALTH CARE EDUCATION/TRAINING PROGRAM
Payer: MEDICARE

## 2022-01-01 ENCOUNTER — APPOINTMENT (OUTPATIENT)
Dept: ULTRASOUND IMAGING | Age: 77
DRG: 981 | End: 2022-01-01
Attending: FAMILY MEDICINE
Payer: MEDICARE

## 2022-01-01 ENCOUNTER — APPOINTMENT (OUTPATIENT)
Dept: CT IMAGING | Age: 77
DRG: 853 | End: 2022-01-01
Attending: SURGERY
Payer: MEDICARE

## 2022-01-01 ENCOUNTER — ANESTHESIA (OUTPATIENT)
Dept: ENDOSCOPY | Age: 77
DRG: 853 | End: 2022-01-01
Payer: MEDICARE

## 2022-01-01 ENCOUNTER — APPOINTMENT (OUTPATIENT)
Dept: NON INVASIVE DIAGNOSTICS | Age: 77
DRG: 853 | End: 2022-01-01
Attending: NURSE PRACTITIONER
Payer: MEDICARE

## 2022-01-01 ENCOUNTER — APPOINTMENT (OUTPATIENT)
Dept: NON INVASIVE DIAGNOSTICS | Age: 77
DRG: 981 | End: 2022-01-01
Attending: FAMILY MEDICINE
Payer: MEDICARE

## 2022-01-01 ENCOUNTER — HOSPITAL ENCOUNTER (INPATIENT)
Age: 77
LOS: 33 days | Discharge: HOSPICE | DRG: 853 | End: 2022-06-16
Attending: EMERGENCY MEDICINE | Admitting: FAMILY MEDICINE
Payer: MEDICARE

## 2022-01-01 ENCOUNTER — APPOINTMENT (OUTPATIENT)
Dept: CT IMAGING | Age: 77
DRG: 981 | End: 2022-01-01
Attending: EMERGENCY MEDICINE
Payer: MEDICARE

## 2022-01-01 ENCOUNTER — HOSPITAL ENCOUNTER (INPATIENT)
Age: 77
LOS: 10 days | DRG: 951 | End: 2022-06-26
Attending: FAMILY MEDICINE | Admitting: FAMILY MEDICINE
Payer: OTHER MISCELLANEOUS

## 2022-01-01 ENCOUNTER — ANESTHESIA (OUTPATIENT)
Dept: ENDOSCOPY | Age: 77
DRG: 981 | End: 2022-01-01
Payer: MEDICARE

## 2022-01-01 ENCOUNTER — HOSPITAL ENCOUNTER (INPATIENT)
Age: 77
LOS: 18 days | Discharge: ANOTHER HOSPITAL | DRG: 981 | End: 2022-03-15
Attending: EMERGENCY MEDICINE | Admitting: INTERNAL MEDICINE
Payer: MEDICARE

## 2022-01-01 ENCOUNTER — APPOINTMENT (OUTPATIENT)
Dept: ULTRASOUND IMAGING | Age: 77
DRG: 853 | End: 2022-01-01
Attending: INTERNAL MEDICINE
Payer: MEDICARE

## 2022-01-01 ENCOUNTER — APPOINTMENT (OUTPATIENT)
Dept: NON INVASIVE DIAGNOSTICS | Age: 77
DRG: 853 | End: 2022-01-01
Attending: ANESTHESIOLOGY
Payer: MEDICARE

## 2022-01-01 ENCOUNTER — HOSPICE ADMISSION (OUTPATIENT)
Dept: HOSPICE | Facility: HOSPICE | Age: 77
End: 2022-01-01
Payer: MEDICARE

## 2022-01-01 ENCOUNTER — APPOINTMENT (OUTPATIENT)
Dept: GENERAL RADIOLOGY | Age: 77
DRG: 853 | End: 2022-01-01
Attending: SURGERY
Payer: MEDICARE

## 2022-01-01 VITALS
RESPIRATION RATE: 18 BRPM | DIASTOLIC BLOOD PRESSURE: 67 MMHG | TEMPERATURE: 97.3 F | HEIGHT: 67 IN | WEIGHT: 181.22 LBS | BODY MASS INDEX: 28.44 KG/M2 | HEART RATE: 69 BPM | SYSTOLIC BLOOD PRESSURE: 151 MMHG | OXYGEN SATURATION: 98 %

## 2022-01-01 VITALS
HEIGHT: 68 IN | WEIGHT: 191 LBS | OXYGEN SATURATION: 100 % | TEMPERATURE: 98.5 F | RESPIRATION RATE: 18 BRPM | HEART RATE: 82 BPM | SYSTOLIC BLOOD PRESSURE: 151 MMHG | BODY MASS INDEX: 28.95 KG/M2 | DIASTOLIC BLOOD PRESSURE: 52 MMHG

## 2022-01-01 VITALS
BODY MASS INDEX: 33.7 KG/M2 | DIASTOLIC BLOOD PRESSURE: 57 MMHG | TEMPERATURE: 98.9 F | WEIGHT: 214.73 LBS | RESPIRATION RATE: 20 BRPM | HEART RATE: 77 BPM | SYSTOLIC BLOOD PRESSURE: 149 MMHG | OXYGEN SATURATION: 100 % | HEIGHT: 67 IN

## 2022-01-01 VITALS
WEIGHT: 214.73 LBS | SYSTOLIC BLOOD PRESSURE: 103 MMHG | HEIGHT: 67 IN | RESPIRATION RATE: 20 BRPM | DIASTOLIC BLOOD PRESSURE: 52 MMHG | TEMPERATURE: 99.1 F | OXYGEN SATURATION: 67 % | BODY MASS INDEX: 33.7 KG/M2 | HEART RATE: 90 BPM

## 2022-01-01 DIAGNOSIS — Z79.4 UNCONTROLLED TYPE 2 DIABETES MELLITUS WITH HYPERGLYCEMIA, WITH LONG-TERM CURRENT USE OF INSULIN (HCC): ICD-10-CM

## 2022-01-01 DIAGNOSIS — E78.5 HYPERLIPIDEMIA, UNSPECIFIED HYPERLIPIDEMIA TYPE: ICD-10-CM

## 2022-01-01 DIAGNOSIS — R31.9 URINARY TRACT INFECTION WITH HEMATURIA, SITE UNSPECIFIED: ICD-10-CM

## 2022-01-01 DIAGNOSIS — K75.9 HEPATITIS: ICD-10-CM

## 2022-01-01 DIAGNOSIS — I96 GANGRENE OF TOE OF LEFT FOOT (HCC): ICD-10-CM

## 2022-01-01 DIAGNOSIS — L98.422 SKIN ULCER OF SACRUM WITH FAT LAYER EXPOSED (HCC): ICD-10-CM

## 2022-01-01 DIAGNOSIS — E11.42 DIABETIC PERIPHERAL NEUROPATHY (HCC): ICD-10-CM

## 2022-01-01 DIAGNOSIS — T14.8XXA WOUND INFECTION: ICD-10-CM

## 2022-01-01 DIAGNOSIS — N17.9 AKI (ACUTE KIDNEY INJURY) (HCC): ICD-10-CM

## 2022-01-01 DIAGNOSIS — A41.9 SEPSIS, DUE TO UNSPECIFIED ORGANISM, UNSPECIFIED WHETHER ACUTE ORGAN DYSFUNCTION PRESENT (HCC): ICD-10-CM

## 2022-01-01 DIAGNOSIS — E53.8 VITAMIN B12 DEFICIENCY: ICD-10-CM

## 2022-01-01 DIAGNOSIS — I10 ESSENTIAL HYPERTENSION: ICD-10-CM

## 2022-01-01 DIAGNOSIS — R09.89 SUSPECTED DEEP VEIN THROMBOSIS (DVT): ICD-10-CM

## 2022-01-01 DIAGNOSIS — L98.429 SKIN ULCER OF SACRUM, UNSPECIFIED ULCER STAGE (HCC): Primary | ICD-10-CM

## 2022-01-01 DIAGNOSIS — D72.829 LEUKOCYTOSIS, UNSPECIFIED TYPE: ICD-10-CM

## 2022-01-01 DIAGNOSIS — R29.810 FACIAL WEAKNESS: Primary | ICD-10-CM

## 2022-01-01 DIAGNOSIS — E11.49 TYPE II OR UNSPECIFIED TYPE DIABETES MELLITUS WITH NEUROLOGICAL MANIFESTATIONS, UNCONTROLLED(250.62) (HCC): ICD-10-CM

## 2022-01-01 DIAGNOSIS — E55.9 VITAMIN D DEFICIENCY: ICD-10-CM

## 2022-01-01 DIAGNOSIS — R52 PAIN: ICD-10-CM

## 2022-01-01 DIAGNOSIS — J96.01 ACUTE RESPIRATORY FAILURE WITH HYPOXIA (HCC): ICD-10-CM

## 2022-01-01 DIAGNOSIS — E11.65 TYPE 2 DIABETES MELLITUS WITH HYPERGLYCEMIA, WITH LONG-TERM CURRENT USE OF INSULIN (HCC): ICD-10-CM

## 2022-01-01 DIAGNOSIS — E11.65 UNCONTROLLED TYPE 2 DIABETES MELLITUS WITH HYPERGLYCEMIA, WITH LONG-TERM CURRENT USE OF INSULIN (HCC): ICD-10-CM

## 2022-01-01 DIAGNOSIS — N39.0 URINARY TRACT INFECTION WITH HEMATURIA, SITE UNSPECIFIED: ICD-10-CM

## 2022-01-01 DIAGNOSIS — G62.9 NEUROPATHY: ICD-10-CM

## 2022-01-01 DIAGNOSIS — E87.5 ACUTE HYPERKALEMIA: ICD-10-CM

## 2022-01-01 DIAGNOSIS — I63.9 CEREBROVASCULAR ACCIDENT (CVA), UNSPECIFIED MECHANISM (HCC): ICD-10-CM

## 2022-01-01 DIAGNOSIS — R77.8 ELEVATED TROPONIN: ICD-10-CM

## 2022-01-01 DIAGNOSIS — L08.9 WOUND INFECTION: ICD-10-CM

## 2022-01-01 DIAGNOSIS — I63.9 ACUTE CVA (CEREBROVASCULAR ACCIDENT) (HCC): ICD-10-CM

## 2022-01-01 DIAGNOSIS — S31.000D WOUND OF SACRAL REGION, SUBSEQUENT ENCOUNTER: ICD-10-CM

## 2022-01-01 DIAGNOSIS — R41.82 ALTERED MENTAL STATUS, UNSPECIFIED ALTERED MENTAL STATUS TYPE: Primary | ICD-10-CM

## 2022-01-01 DIAGNOSIS — I73.9 PAD (PERIPHERAL ARTERY DISEASE) (HCC): ICD-10-CM

## 2022-01-01 DIAGNOSIS — Z79.4 TYPE 2 DIABETES MELLITUS WITH HYPERGLYCEMIA, WITH LONG-TERM CURRENT USE OF INSULIN (HCC): ICD-10-CM

## 2022-01-01 DIAGNOSIS — E78.2 MIXED HYPERLIPIDEMIA: ICD-10-CM

## 2022-01-01 DIAGNOSIS — B37.49 CANDIDA UTI: ICD-10-CM

## 2022-01-01 DIAGNOSIS — N18.9 CHRONIC RENAL IMPAIRMENT, UNSPECIFIED CKD STAGE: ICD-10-CM

## 2022-01-01 DIAGNOSIS — A41.9 SEPSIS WITHOUT ACUTE ORGAN DYSFUNCTION, DUE TO UNSPECIFIED ORGANISM (HCC): ICD-10-CM

## 2022-01-01 DIAGNOSIS — R52 PAIN: Primary | ICD-10-CM

## 2022-01-01 DIAGNOSIS — N39.0 COMPLICATED UTI (URINARY TRACT INFECTION): ICD-10-CM

## 2022-01-01 DIAGNOSIS — E83.52 HYPERCALCEMIA: ICD-10-CM

## 2022-01-01 DIAGNOSIS — B19.20 HEPATITIS C TEST POSITIVE: ICD-10-CM

## 2022-01-01 LAB
ABO + RH BLD: NORMAL
ALBUMIN SERPL-MCNC: 1.3 G/DL (ref 3.5–5)
ALBUMIN SERPL-MCNC: 1.4 G/DL (ref 3.5–5)
ALBUMIN SERPL-MCNC: 1.5 G/DL (ref 3.5–5)
ALBUMIN SERPL-MCNC: 1.6 G/DL (ref 3.5–5)
ALBUMIN SERPL-MCNC: 1.7 G/DL (ref 3.5–5)
ALBUMIN SERPL-MCNC: 1.8 G/DL (ref 3.5–5)
ALBUMIN SERPL-MCNC: 1.9 G/DL (ref 3.5–5)
ALBUMIN SERPL-MCNC: 2 G/DL (ref 3.5–5)
ALBUMIN SERPL-MCNC: 2.1 G/DL (ref 3.5–5)
ALBUMIN SERPL-MCNC: 2.1 G/DL (ref 3.5–5)
ALBUMIN SERPL-MCNC: 2.3 G/DL (ref 3.5–5)
ALBUMIN SERPL-MCNC: 2.4 G/DL (ref 3.5–5)
ALBUMIN SERPL-MCNC: 2.7 G/DL (ref 3.5–5)
ALBUMIN/GLOB SERPL: 0.2 {RATIO} (ref 1.1–2.2)
ALBUMIN/GLOB SERPL: 0.2 {RATIO} (ref 1.1–2.2)
ALBUMIN/GLOB SERPL: 0.3 {RATIO} (ref 1.1–2.2)
ALBUMIN/GLOB SERPL: 0.4 {RATIO} (ref 1.1–2.2)
ALBUMIN/GLOB SERPL: 0.5 {RATIO} (ref 1.1–2.2)
ALP SERPL-CCNC: 101 U/L (ref 45–117)
ALP SERPL-CCNC: 105 U/L (ref 45–117)
ALP SERPL-CCNC: 106 U/L (ref 45–117)
ALP SERPL-CCNC: 106 U/L (ref 45–117)
ALP SERPL-CCNC: 107 U/L (ref 45–117)
ALP SERPL-CCNC: 108 U/L (ref 45–117)
ALP SERPL-CCNC: 112 U/L (ref 45–117)
ALP SERPL-CCNC: 114 U/L (ref 45–117)
ALP SERPL-CCNC: 114 U/L (ref 45–117)
ALP SERPL-CCNC: 117 U/L (ref 45–117)
ALP SERPL-CCNC: 118 U/L (ref 45–117)
ALP SERPL-CCNC: 128 U/L (ref 45–117)
ALP SERPL-CCNC: 129 U/L (ref 45–117)
ALP SERPL-CCNC: 130 U/L (ref 45–117)
ALP SERPL-CCNC: 130 U/L (ref 45–117)
ALP SERPL-CCNC: 133 U/L (ref 45–117)
ALP SERPL-CCNC: 134 U/L (ref 45–117)
ALP SERPL-CCNC: 137 U/L (ref 45–117)
ALP SERPL-CCNC: 139 U/L (ref 45–117)
ALP SERPL-CCNC: 139 U/L (ref 45–117)
ALP SERPL-CCNC: 140 U/L (ref 45–117)
ALP SERPL-CCNC: 145 U/L (ref 45–117)
ALP SERPL-CCNC: 150 U/L (ref 45–117)
ALP SERPL-CCNC: 151 U/L (ref 45–117)
ALP SERPL-CCNC: 152 U/L (ref 45–117)
ALP SERPL-CCNC: 156 U/L (ref 45–117)
ALP SERPL-CCNC: 72 U/L (ref 45–117)
ALP SERPL-CCNC: 72 U/L (ref 45–117)
ALP SERPL-CCNC: 75 U/L (ref 45–117)
ALP SERPL-CCNC: 77 U/L (ref 45–117)
ALP SERPL-CCNC: 79 U/L (ref 45–117)
ALP SERPL-CCNC: 80 U/L (ref 45–117)
ALP SERPL-CCNC: 81 U/L (ref 45–117)
ALP SERPL-CCNC: 82 U/L (ref 45–117)
ALP SERPL-CCNC: 83 U/L (ref 45–117)
ALP SERPL-CCNC: 85 U/L (ref 45–117)
ALP SERPL-CCNC: 87 U/L (ref 45–117)
ALP SERPL-CCNC: 88 U/L (ref 45–117)
ALP SERPL-CCNC: 88 U/L (ref 45–117)
ALP SERPL-CCNC: 89 U/L (ref 45–117)
ALP SERPL-CCNC: 91 U/L (ref 45–117)
ALP SERPL-CCNC: 92 U/L (ref 45–117)
ALP SERPL-CCNC: 93 U/L (ref 45–117)
ALP SERPL-CCNC: 94 U/L (ref 45–117)
ALP SERPL-CCNC: 95 U/L (ref 45–117)
ALP SERPL-CCNC: 95 U/L (ref 45–117)
ALP SERPL-CCNC: 96 U/L (ref 45–117)
ALP SERPL-CCNC: 98 U/L (ref 45–117)
ALP SERPL-CCNC: 98 U/L (ref 45–117)
ALP SERPL-CCNC: 99 U/L (ref 45–117)
ALT SERPL-CCNC: 100 U/L (ref 12–78)
ALT SERPL-CCNC: 109 U/L (ref 12–78)
ALT SERPL-CCNC: 122 U/L (ref 12–78)
ALT SERPL-CCNC: 1313 U/L (ref 12–78)
ALT SERPL-CCNC: 134 U/L (ref 12–78)
ALT SERPL-CCNC: 1361 U/L (ref 12–78)
ALT SERPL-CCNC: 1461 U/L (ref 12–78)
ALT SERPL-CCNC: 1467 U/L (ref 12–78)
ALT SERPL-CCNC: 1492 U/L (ref 12–78)
ALT SERPL-CCNC: 1493 U/L (ref 12–78)
ALT SERPL-CCNC: 150 U/L (ref 12–78)
ALT SERPL-CCNC: 1551 U/L (ref 12–78)
ALT SERPL-CCNC: 1570 U/L (ref 12–78)
ALT SERPL-CCNC: 187 U/L (ref 12–78)
ALT SERPL-CCNC: 197 U/L (ref 12–78)
ALT SERPL-CCNC: 25 U/L (ref 12–78)
ALT SERPL-CCNC: 26 U/L (ref 12–78)
ALT SERPL-CCNC: 26 U/L (ref 12–78)
ALT SERPL-CCNC: 2657 U/L (ref 12–78)
ALT SERPL-CCNC: 276 U/L (ref 12–78)
ALT SERPL-CCNC: 28 U/L (ref 12–78)
ALT SERPL-CCNC: 296 U/L (ref 12–78)
ALT SERPL-CCNC: 32 U/L (ref 12–78)
ALT SERPL-CCNC: 32 U/L (ref 12–78)
ALT SERPL-CCNC: 325 U/L (ref 12–78)
ALT SERPL-CCNC: 350 U/L (ref 12–78)
ALT SERPL-CCNC: 36 U/L (ref 12–78)
ALT SERPL-CCNC: 37 U/L (ref 12–78)
ALT SERPL-CCNC: 38 U/L (ref 12–78)
ALT SERPL-CCNC: 38 U/L (ref 12–78)
ALT SERPL-CCNC: 40 U/L (ref 12–78)
ALT SERPL-CCNC: 41 U/L (ref 12–78)
ALT SERPL-CCNC: 41 U/L (ref 12–78)
ALT SERPL-CCNC: 42 U/L (ref 12–78)
ALT SERPL-CCNC: 431 U/L (ref 12–78)
ALT SERPL-CCNC: 44 U/L (ref 12–78)
ALT SERPL-CCNC: 47 U/L (ref 12–78)
ALT SERPL-CCNC: 48 U/L (ref 12–78)
ALT SERPL-CCNC: 50 U/L (ref 12–78)
ALT SERPL-CCNC: 51 U/L (ref 12–78)
ALT SERPL-CCNC: 525 U/L (ref 12–78)
ALT SERPL-CCNC: 53 U/L (ref 12–78)
ALT SERPL-CCNC: 54 U/L (ref 12–78)
ALT SERPL-CCNC: 54 U/L (ref 12–78)
ALT SERPL-CCNC: 55 U/L (ref 12–78)
ALT SERPL-CCNC: 56 U/L (ref 12–78)
ALT SERPL-CCNC: 569 U/L (ref 12–78)
ALT SERPL-CCNC: 59 U/L (ref 12–78)
ALT SERPL-CCNC: 61 U/L (ref 12–78)
ALT SERPL-CCNC: 61 U/L (ref 12–78)
ALT SERPL-CCNC: 62 U/L (ref 12–78)
ALT SERPL-CCNC: 64 U/L (ref 12–78)
ALT SERPL-CCNC: 66 U/L (ref 12–78)
ALT SERPL-CCNC: 67 U/L (ref 12–78)
ALT SERPL-CCNC: 70 U/L (ref 12–78)
ALT SERPL-CCNC: 72 U/L (ref 12–78)
ALT SERPL-CCNC: 73 U/L (ref 12–78)
ALT SERPL-CCNC: 78 U/L (ref 12–78)
ALT SERPL-CCNC: 788 U/L (ref 12–78)
ALT SERPL-CCNC: 80 U/L (ref 12–78)
ALT SERPL-CCNC: 833 U/L (ref 12–78)
ALT SERPL-CCNC: 90 U/L (ref 12–78)
ALT SERPL-CCNC: 94 U/L (ref 12–78)
ALT SERPL-CCNC: 98 U/L (ref 12–78)
ALT SERPL-CCNC: 99 U/L (ref 12–78)
ANION GAP SERPL CALC-SCNC: 10 MMOL/L (ref 5–15)
ANION GAP SERPL CALC-SCNC: 11 MMOL/L (ref 5–15)
ANION GAP SERPL CALC-SCNC: 12 MMOL/L (ref 5–15)
ANION GAP SERPL CALC-SCNC: 15 MMOL/L (ref 5–15)
ANION GAP SERPL CALC-SCNC: 17 MMOL/L (ref 5–15)
ANION GAP SERPL CALC-SCNC: 2 MMOL/L (ref 5–15)
ANION GAP SERPL CALC-SCNC: 3 MMOL/L (ref 5–15)
ANION GAP SERPL CALC-SCNC: 4 MMOL/L (ref 5–15)
ANION GAP SERPL CALC-SCNC: 5 MMOL/L (ref 5–15)
ANION GAP SERPL CALC-SCNC: 6 MMOL/L (ref 5–15)
ANION GAP SERPL CALC-SCNC: 7 MMOL/L (ref 5–15)
ANION GAP SERPL CALC-SCNC: 8 MMOL/L (ref 5–15)
ANION GAP SERPL CALC-SCNC: 9 MMOL/L (ref 5–15)
APPEARANCE UR: ABNORMAL
APPEARANCE UR: CLEAR
APPEARANCE UR: CLEAR
APTT PPP: 31.7 SEC (ref 21.2–34.1)
APTT PPP: 32.2 SEC (ref 21.2–34.1)
ARTERIAL PATENCY WRIST A: ABNORMAL
ARTERIAL PATENCY WRIST A: NORMAL
ARTERIAL PATENCY WRIST A: NORMAL
ARTERIAL PATENCY WRIST A: POSITIVE
ARTERIAL PATENCY WRIST A: POSITIVE
AST SERPL W P-5'-P-CCNC: 107 U/L (ref 15–37)
AST SERPL W P-5'-P-CCNC: 1268 U/L (ref 15–37)
AST SERPL W P-5'-P-CCNC: 128 U/L (ref 15–37)
AST SERPL W P-5'-P-CCNC: 14 U/L (ref 15–37)
AST SERPL W P-5'-P-CCNC: 142 U/L (ref 15–37)
AST SERPL W P-5'-P-CCNC: 1420 U/L (ref 15–37)
AST SERPL W P-5'-P-CCNC: 1458 U/L (ref 15–37)
AST SERPL W P-5'-P-CCNC: 1463 U/L (ref 15–37)
AST SERPL W P-5'-P-CCNC: 15 U/L (ref 15–37)
AST SERPL W P-5'-P-CCNC: 158 U/L (ref 15–37)
AST SERPL W P-5'-P-CCNC: 16 U/L (ref 15–37)
AST SERPL W P-5'-P-CCNC: 17 U/L (ref 15–37)
AST SERPL W P-5'-P-CCNC: 18 U/L (ref 15–37)
AST SERPL W P-5'-P-CCNC: 20 U/L (ref 15–37)
AST SERPL W P-5'-P-CCNC: 21 U/L (ref 15–37)
AST SERPL W P-5'-P-CCNC: 21 U/L (ref 15–37)
AST SERPL W P-5'-P-CCNC: 22 U/L (ref 15–37)
AST SERPL W P-5'-P-CCNC: 23 U/L (ref 15–37)
AST SERPL W P-5'-P-CCNC: 24 U/L (ref 15–37)
AST SERPL W P-5'-P-CCNC: 24 U/L (ref 15–37)
AST SERPL W P-5'-P-CCNC: 25 U/L (ref 15–37)
AST SERPL W P-5'-P-CCNC: 26 U/L (ref 15–37)
AST SERPL W P-5'-P-CCNC: 27 U/L (ref 15–37)
AST SERPL W P-5'-P-CCNC: 28 U/L (ref 15–37)
AST SERPL W P-5'-P-CCNC: 28 U/L (ref 15–37)
AST SERPL W P-5'-P-CCNC: 29 U/L (ref 15–37)
AST SERPL W P-5'-P-CCNC: 31 U/L (ref 15–37)
AST SERPL W P-5'-P-CCNC: 317 U/L (ref 15–37)
AST SERPL W P-5'-P-CCNC: 33 U/L (ref 15–37)
AST SERPL W P-5'-P-CCNC: 33 U/L (ref 15–37)
AST SERPL W P-5'-P-CCNC: 35 U/L (ref 15–37)
AST SERPL W P-5'-P-CCNC: 35 U/L (ref 15–37)
AST SERPL W P-5'-P-CCNC: 37 U/L (ref 15–37)
AST SERPL W P-5'-P-CCNC: 38 U/L (ref 15–37)
AST SERPL W P-5'-P-CCNC: 39 U/L (ref 15–37)
AST SERPL W P-5'-P-CCNC: 39 U/L (ref 15–37)
AST SERPL W P-5'-P-CCNC: 40 U/L (ref 15–37)
AST SERPL W P-5'-P-CCNC: 42 U/L (ref 15–37)
AST SERPL W P-5'-P-CCNC: 48 U/L (ref 15–37)
AST SERPL W P-5'-P-CCNC: 51 U/L (ref 15–37)
AST SERPL W P-5'-P-CCNC: 52 U/L (ref 15–37)
AST SERPL W P-5'-P-CCNC: 57 U/L (ref 15–37)
AST SERPL W P-5'-P-CCNC: 57 U/L (ref 15–37)
AST SERPL W P-5'-P-CCNC: 574 U/L (ref 15–37)
AST SERPL W P-5'-P-CCNC: 58 U/L (ref 15–37)
AST SERPL W P-5'-P-CCNC: 62 U/L (ref 15–37)
AST SERPL W P-5'-P-CCNC: 71 U/L (ref 15–37)
AST SERPL W P-5'-P-CCNC: 77 U/L (ref 15–37)
AST SERPL W P-5'-P-CCNC: 802 U/L (ref 15–37)
AST SERPL W P-5'-P-CCNC: 88 U/L (ref 15–37)
AST SERPL W P-5'-P-CCNC: >2000 U/L (ref 15–37)
AST SERPL W P-5'-P-CCNC: >2000 U/L (ref 15–37)
AST SERPL W P-5'-P-CCNC: ABNORMAL U/L (ref 15–37)
ATRIAL RATE: 288 BPM
ATRIAL RATE: 59 BPM
ATRIAL RATE: 63 BPM
ATRIAL RATE: 70 BPM
ATRIAL RATE: 75 BPM
ATRIAL RATE: 76 BPM
ATRIAL RATE: 77 BPM
ATRIAL RATE: 81 BPM
ATRIAL RATE: 93 BPM
BACTERIA SPEC CULT: ABNORMAL
BACTERIA SPEC CULT: NORMAL
BACTERIA URNS QL MICRO: ABNORMAL /HPF
BACTERIA URNS QL MICRO: ABNORMAL /HPF
BACTERIA URNS QL MICRO: NEGATIVE /HPF
BASE DEFICIT BLDA-SCNC: 0.6 MMOL/L (ref 0–2)
BASE DEFICIT BLDA-SCNC: 1.4 MMOL/L (ref 0–2)
BASE DEFICIT BLDA-SCNC: 1.4 MMOL/L (ref 0–2)
BASE DEFICIT BLDA-SCNC: 1.5 MMOL/L (ref 0–2)
BASE DEFICIT BLDA-SCNC: 1.5 MMOL/L (ref 0–2)
BASE DEFICIT BLDA-SCNC: 1.8 MMOL/L (ref 0–2)
BASE DEFICIT BLDA-SCNC: 3 MMOL/L (ref 0–2)
BASE DEFICIT BLDA-SCNC: 3.4 MMOL/L (ref 0–2)
BASE DEFICIT BLDA-SCNC: 3.9 MMOL/L (ref 0–2)
BASE DEFICIT BLDA-SCNC: 4 MMOL/L (ref 0–2)
BASE DEFICIT BLDA-SCNC: 4.9 MMOL/L (ref 0–2)
BASE DEFICIT BLDA-SCNC: 5.4 MMOL/L (ref 0–2)
BASE DEFICIT BLDA-SCNC: 5.5 MMOL/L (ref 0–2)
BASE DEFICIT BLDA-SCNC: 5.9 MMOL/L (ref 0–2)
BASE DEFICIT BLDA-SCNC: 6.4 MMOL/L (ref 0–2)
BASE DEFICIT BLDA-SCNC: 6.8 MMOL/L (ref 0–2)
BASE DEFICIT BLDA-SCNC: 7 MMOL/L (ref 0–2)
BASE DEFICIT BLDA-SCNC: 7.3 MMOL/L (ref 0–2)
BASE DEFICIT BLDA-SCNC: 7.3 MMOL/L (ref 0–2)
BASE DEFICIT BLDA-SCNC: 8 MMOL/L (ref 0–2)
BASE DEFICIT BLDA-SCNC: 8 MMOL/L (ref 0–2)
BASE DEFICIT BLDA-SCNC: 8.1 MMOL/L (ref 0–2)
BASE DEFICIT BLDA-SCNC: 8.5 MMOL/L (ref 0–2)
BASE DEFICIT BLDA-SCNC: 8.5 MMOL/L (ref 0–2)
BASE DEFICIT BLDA-SCNC: 9.4 MMOL/L (ref 0–2)
BASE EXCESS BLDA CALC-SCNC: 0.7 MMOL/L (ref 0–2)
BASE EXCESS BLDA CALC-SCNC: 2.2 MMOL/L (ref 0–2)
BASE EXCESS BLDV CALC-SCNC: 1.2 MMOL/L (ref 0–2)
BASOPHILS # BLD: 0 K/UL (ref 0–0.1)
BASOPHILS # BLD: 0.1 K/UL (ref 0–0.1)
BASOPHILS NFR BLD: 0 % (ref 0–1)
BASOPHILS NFR BLD: 1 % (ref 0–1)
BDY SITE: ABNORMAL
BDY SITE: NORMAL
BILIRUB DIRECT SERPL-MCNC: 0.1 MG/DL (ref 0–0.2)
BILIRUB DIRECT SERPL-MCNC: 0.2 MG/DL (ref 0–0.2)
BILIRUB DIRECT SERPL-MCNC: 0.3 MG/DL (ref 0–0.2)
BILIRUB DIRECT SERPL-MCNC: <0.1 MG/DL (ref 0–0.2)
BILIRUB SERPL-MCNC: 0.2 MG/DL (ref 0.2–1)
BILIRUB SERPL-MCNC: 0.3 MG/DL (ref 0.2–1)
BILIRUB SERPL-MCNC: 0.4 MG/DL (ref 0.2–1)
BILIRUB SERPL-MCNC: 0.5 MG/DL (ref 0.2–1)
BILIRUB SERPL-MCNC: 0.6 MG/DL (ref 0.2–1)
BILIRUB SERPL-MCNC: 0.7 MG/DL (ref 0.2–1)
BILIRUB SERPL-MCNC: 0.8 MG/DL (ref 0.2–1)
BILIRUB UR QL: NEGATIVE
BLD PROD TYP BPU: NORMAL
BLOOD GROUP ANTIBODIES SERPL: NEGATIVE
BNP SERPL-MCNC: 8859 PG/ML
BNP SERPL-MCNC: ABNORMAL PG/ML
BPU ID: NORMAL
BUN SERPL-MCNC: 105 MG/DL (ref 6–20)
BUN SERPL-MCNC: 21 MG/DL (ref 6–20)
BUN SERPL-MCNC: 22 MG/DL (ref 6–20)
BUN SERPL-MCNC: 24 MG/DL (ref 6–20)
BUN SERPL-MCNC: 25 MG/DL (ref 6–20)
BUN SERPL-MCNC: 25 MG/DL (ref 6–20)
BUN SERPL-MCNC: 26 MG/DL (ref 6–20)
BUN SERPL-MCNC: 27 MG/DL (ref 6–20)
BUN SERPL-MCNC: 29 MG/DL (ref 6–20)
BUN SERPL-MCNC: 30 MG/DL (ref 6–20)
BUN SERPL-MCNC: 31 MG/DL (ref 6–20)
BUN SERPL-MCNC: 31 MG/DL (ref 6–20)
BUN SERPL-MCNC: 32 MG/DL (ref 6–20)
BUN SERPL-MCNC: 33 MG/DL (ref 6–20)
BUN SERPL-MCNC: 33 MG/DL (ref 6–20)
BUN SERPL-MCNC: 34 MG/DL (ref 6–20)
BUN SERPL-MCNC: 35 MG/DL (ref 6–20)
BUN SERPL-MCNC: 36 MG/DL (ref 6–20)
BUN SERPL-MCNC: 37 MG/DL (ref 6–20)
BUN SERPL-MCNC: 37 MG/DL (ref 6–20)
BUN SERPL-MCNC: 39 MG/DL (ref 6–20)
BUN SERPL-MCNC: 40 MG/DL (ref 6–20)
BUN SERPL-MCNC: 42 MG/DL (ref 6–20)
BUN SERPL-MCNC: 42 MG/DL (ref 6–20)
BUN SERPL-MCNC: 43 MG/DL (ref 6–20)
BUN SERPL-MCNC: 44 MG/DL (ref 6–20)
BUN SERPL-MCNC: 44 MG/DL (ref 6–20)
BUN SERPL-MCNC: 45 MG/DL (ref 6–20)
BUN SERPL-MCNC: 45 MG/DL (ref 6–20)
BUN SERPL-MCNC: 46 MG/DL (ref 6–20)
BUN SERPL-MCNC: 47 MG/DL (ref 6–20)
BUN SERPL-MCNC: 47 MG/DL (ref 6–20)
BUN SERPL-MCNC: 48 MG/DL (ref 6–20)
BUN SERPL-MCNC: 49 MG/DL (ref 6–20)
BUN SERPL-MCNC: 53 MG/DL (ref 6–20)
BUN SERPL-MCNC: 54 MG/DL (ref 6–20)
BUN SERPL-MCNC: 54 MG/DL (ref 6–20)
BUN SERPL-MCNC: 55 MG/DL (ref 6–20)
BUN SERPL-MCNC: 55 MG/DL (ref 6–20)
BUN SERPL-MCNC: 56 MG/DL (ref 6–20)
BUN SERPL-MCNC: 58 MG/DL (ref 6–20)
BUN SERPL-MCNC: 59 MG/DL (ref 6–20)
BUN SERPL-MCNC: 63 MG/DL (ref 6–20)
BUN SERPL-MCNC: 64 MG/DL (ref 6–20)
BUN SERPL-MCNC: 65 MG/DL (ref 6–20)
BUN SERPL-MCNC: 66 MG/DL (ref 6–20)
BUN SERPL-MCNC: 67 MG/DL (ref 6–20)
BUN SERPL-MCNC: 68 MG/DL (ref 6–20)
BUN SERPL-MCNC: 68 MG/DL (ref 6–20)
BUN SERPL-MCNC: 69 MG/DL (ref 6–20)
BUN SERPL-MCNC: 72 MG/DL (ref 6–20)
BUN SERPL-MCNC: 73 MG/DL (ref 6–20)
BUN SERPL-MCNC: 76 MG/DL (ref 6–20)
BUN SERPL-MCNC: 78 MG/DL (ref 6–20)
BUN SERPL-MCNC: 84 MG/DL (ref 6–20)
BUN SERPL-MCNC: 92 MG/DL (ref 6–20)
BUN SERPL-MCNC: 94 MG/DL (ref 6–20)
BUN SERPL-MCNC: 94 MG/DL (ref 6–20)
BUN/CREAT SERPL: 18 (ref 12–20)
BUN/CREAT SERPL: 18 (ref 12–20)
BUN/CREAT SERPL: 20 (ref 12–20)
BUN/CREAT SERPL: 20 (ref 12–20)
BUN/CREAT SERPL: 21 (ref 12–20)
BUN/CREAT SERPL: 22 (ref 12–20)
BUN/CREAT SERPL: 22 (ref 12–20)
BUN/CREAT SERPL: 23 (ref 12–20)
BUN/CREAT SERPL: 24 (ref 12–20)
BUN/CREAT SERPL: 24 (ref 12–20)
BUN/CREAT SERPL: 25 (ref 12–20)
BUN/CREAT SERPL: 25 (ref 12–20)
BUN/CREAT SERPL: 26 (ref 12–20)
BUN/CREAT SERPL: 27 (ref 12–20)
BUN/CREAT SERPL: 27 (ref 12–20)
BUN/CREAT SERPL: 29 (ref 12–20)
BUN/CREAT SERPL: 30 (ref 12–20)
BUN/CREAT SERPL: 31 (ref 12–20)
BUN/CREAT SERPL: 32 (ref 12–20)
BUN/CREAT SERPL: 32 (ref 12–20)
BUN/CREAT SERPL: 33 (ref 12–20)
BUN/CREAT SERPL: 34 (ref 12–20)
BUN/CREAT SERPL: 35 (ref 12–20)
BUN/CREAT SERPL: 35 (ref 12–20)
BUN/CREAT SERPL: 36 (ref 12–20)
BUN/CREAT SERPL: 37 (ref 12–20)
BUN/CREAT SERPL: 37 (ref 12–20)
BUN/CREAT SERPL: 38 (ref 12–20)
BUN/CREAT SERPL: 39 (ref 12–20)
BUN/CREAT SERPL: 40 (ref 12–20)
BUN/CREAT SERPL: 41 (ref 12–20)
BUN/CREAT SERPL: 42 (ref 12–20)
BUN/CREAT SERPL: 43 (ref 12–20)
BUN/CREAT SERPL: 44 (ref 12–20)
BUN/CREAT SERPL: 44 (ref 12–20)
BUN/CREAT SERPL: 45 (ref 12–20)
BUN/CREAT SERPL: 46 (ref 12–20)
BUN/CREAT SERPL: 47 (ref 12–20)
BUN/CREAT SERPL: 47 (ref 12–20)
BUN/CREAT SERPL: 48 (ref 12–20)
BUN/CREAT SERPL: 48 (ref 12–20)
BUN/CREAT SERPL: 49 (ref 12–20)
BUN/CREAT SERPL: 51 (ref 12–20)
BUN/CREAT SERPL: 52 (ref 12–20)
BUN/CREAT SERPL: 57 (ref 12–20)
BUN/CREAT SERPL: 61 (ref 12–20)
BUN/CREAT SERPL: 63 (ref 12–20)
CA-I BLD-MCNC: 1.24 MMOL/L
CA-I BLD-MCNC: 7.8 MG/DL (ref 8.5–10.1)
CA-I BLD-MCNC: 7.9 MG/DL (ref 8.5–10.1)
CA-I BLD-MCNC: 8 MG/DL (ref 8.5–10.1)
CA-I BLD-MCNC: 8.1 MG/DL (ref 8.5–10.1)
CA-I BLD-MCNC: 8.2 MG/DL (ref 8.5–10.1)
CA-I BLD-MCNC: 8.3 MG/DL (ref 8.5–10.1)
CA-I BLD-MCNC: 8.4 MG/DL (ref 8.5–10.1)
CA-I BLD-MCNC: 8.5 MG/DL (ref 8.5–10.1)
CA-I BLD-MCNC: 8.6 MG/DL (ref 8.5–10.1)
CA-I BLD-MCNC: 8.7 MG/DL (ref 8.5–10.1)
CA-I BLD-MCNC: 8.8 MG/DL (ref 8.5–10.1)
CA-I BLD-MCNC: 8.9 MG/DL (ref 8.5–10.1)
CA-I BLD-MCNC: 9 MG/DL (ref 8.5–10.1)
CA-I BLD-MCNC: 9.1 MG/DL (ref 8.5–10.1)
CA-I BLD-MCNC: 9.2 MG/DL (ref 8.5–10.1)
CA-I BLD-MCNC: 9.3 MG/DL (ref 8.5–10.1)
CA-I BLD-MCNC: 9.5 MG/DL (ref 8.5–10.1)
CALCULATED P AXIS, ECG09: 38 DEGREES
CALCULATED P AXIS, ECG09: 42 DEGREES
CALCULATED P AXIS, ECG09: 46 DEGREES
CALCULATED P AXIS, ECG09: 48 DEGREES
CALCULATED P AXIS, ECG09: 50 DEGREES
CALCULATED P AXIS, ECG09: 55 DEGREES
CALCULATED P AXIS, ECG09: 60 DEGREES
CALCULATED P AXIS, ECG09: 63 DEGREES
CALCULATED R AXIS, ECG10: 15 DEGREES
CALCULATED R AXIS, ECG10: 32 DEGREES
CALCULATED R AXIS, ECG10: 37 DEGREES
CALCULATED R AXIS, ECG10: 38 DEGREES
CALCULATED R AXIS, ECG10: 39 DEGREES
CALCULATED R AXIS, ECG10: 40 DEGREES
CALCULATED R AXIS, ECG10: 40 DEGREES
CALCULATED R AXIS, ECG10: 45 DEGREES
CALCULATED R AXIS, ECG10: 70 DEGREES
CALCULATED T AXIS, ECG11: -177 DEGREES
CALCULATED T AXIS, ECG11: -20 DEGREES
CALCULATED T AXIS, ECG11: 133 DEGREES
CALCULATED T AXIS, ECG11: 40 DEGREES
CALCULATED T AXIS, ECG11: 45 DEGREES
CALCULATED T AXIS, ECG11: 49 DEGREES
CALCULATED T AXIS, ECG11: 52 DEGREES
CALCULATED T AXIS, ECG11: 53 DEGREES
CALCULATED T AXIS, ECG11: 68 DEGREES
CHLORIDE SERPL-SCNC: 104 MMOL/L (ref 97–108)
CHLORIDE SERPL-SCNC: 105 MMOL/L (ref 97–108)
CHLORIDE SERPL-SCNC: 105 MMOL/L (ref 97–108)
CHLORIDE SERPL-SCNC: 106 MMOL/L (ref 97–108)
CHLORIDE SERPL-SCNC: 107 MMOL/L (ref 97–108)
CHLORIDE SERPL-SCNC: 108 MMOL/L (ref 97–108)
CHLORIDE SERPL-SCNC: 109 MMOL/L (ref 97–108)
CHLORIDE SERPL-SCNC: 110 MMOL/L (ref 97–108)
CHLORIDE SERPL-SCNC: 111 MMOL/L (ref 97–108)
CHLORIDE SERPL-SCNC: 111 MMOL/L (ref 97–108)
CHLORIDE SERPL-SCNC: 112 MMOL/L (ref 97–108)
CHLORIDE SERPL-SCNC: 112 MMOL/L (ref 97–108)
CHLORIDE SERPL-SCNC: 113 MMOL/L (ref 97–108)
CHLORIDE SERPL-SCNC: 114 MMOL/L (ref 97–108)
CHLORIDE SERPL-SCNC: 115 MMOL/L (ref 97–108)
CHLORIDE SERPL-SCNC: 116 MMOL/L (ref 97–108)
CHLORIDE SERPL-SCNC: 117 MMOL/L (ref 97–108)
CHLORIDE SERPL-SCNC: 118 MMOL/L (ref 97–108)
CHLORIDE SERPL-SCNC: 119 MMOL/L (ref 97–108)
CHLORIDE SERPL-SCNC: 120 MMOL/L (ref 97–108)
CHLORIDE SERPL-SCNC: 121 MMOL/L (ref 97–108)
CHLORIDE SERPL-SCNC: 121 MMOL/L (ref 97–108)
CHLORIDE SERPL-SCNC: 122 MMOL/L (ref 97–108)
CHLORIDE SERPL-SCNC: 122 MMOL/L (ref 97–108)
CHLORIDE SERPL-SCNC: 123 MMOL/L (ref 97–108)
CHLORIDE SERPL-SCNC: 124 MMOL/L (ref 97–108)
CHLORIDE SERPL-SCNC: 125 MMOL/L (ref 97–108)
CHLORIDE SERPL-SCNC: 126 MMOL/L (ref 97–108)
CHOLEST SERPL-MCNC: 124 MG/DL
CK SERPL-CCNC: 631 U/L (ref 26–192)
CO2 SERPL-SCNC: 13 MMOL/L (ref 21–32)
CO2 SERPL-SCNC: 13 MMOL/L (ref 21–32)
CO2 SERPL-SCNC: 18 MMOL/L (ref 21–32)
CO2 SERPL-SCNC: 19 MMOL/L (ref 21–32)
CO2 SERPL-SCNC: 20 MMOL/L (ref 21–32)
CO2 SERPL-SCNC: 21 MMOL/L (ref 21–32)
CO2 SERPL-SCNC: 22 MMOL/L (ref 21–32)
CO2 SERPL-SCNC: 23 MMOL/L (ref 21–32)
CO2 SERPL-SCNC: 24 MMOL/L (ref 21–32)
CO2 SERPL-SCNC: 25 MMOL/L (ref 21–32)
CO2 SERPL-SCNC: 26 MMOL/L (ref 21–32)
CO2 SERPL-SCNC: 27 MMOL/L (ref 21–32)
CO2 SERPL-SCNC: 28 MMOL/L (ref 21–32)
CO2 SERPL-SCNC: 29 MMOL/L (ref 21–32)
CO2 SERPL-SCNC: 30 MMOL/L (ref 21–32)
COLONY COUNT,CNT: ABNORMAL
COLONY COUNT,CNT: ABNORMAL
COLONY COUNT,CNT: NORMAL
COLOR UR: ABNORMAL
COLOR UR: YELLOW
COVID-19 RAPID TEST, COVR: NOT DETECTED
CREAT SERPL-MCNC: 0.53 MG/DL (ref 0.55–1.02)
CREAT SERPL-MCNC: 0.58 MG/DL (ref 0.55–1.02)
CREAT SERPL-MCNC: 0.68 MG/DL (ref 0.55–1.02)
CREAT SERPL-MCNC: 0.7 MG/DL (ref 0.55–1.02)
CREAT SERPL-MCNC: 0.71 MG/DL (ref 0.55–1.02)
CREAT SERPL-MCNC: 0.72 MG/DL (ref 0.55–1.02)
CREAT SERPL-MCNC: 0.72 MG/DL (ref 0.55–1.02)
CREAT SERPL-MCNC: 0.74 MG/DL (ref 0.55–1.02)
CREAT SERPL-MCNC: 0.74 MG/DL (ref 0.55–1.02)
CREAT SERPL-MCNC: 0.76 MG/DL (ref 0.55–1.02)
CREAT SERPL-MCNC: 0.77 MG/DL (ref 0.55–1.02)
CREAT SERPL-MCNC: 0.78 MG/DL (ref 0.55–1.02)
CREAT SERPL-MCNC: 0.78 MG/DL (ref 0.55–1.02)
CREAT SERPL-MCNC: 0.79 MG/DL (ref 0.55–1.02)
CREAT SERPL-MCNC: 0.79 MG/DL (ref 0.55–1.02)
CREAT SERPL-MCNC: 0.8 MG/DL (ref 0.55–1.02)
CREAT SERPL-MCNC: 0.82 MG/DL (ref 0.55–1.02)
CREAT SERPL-MCNC: 0.83 MG/DL (ref 0.55–1.02)
CREAT SERPL-MCNC: 0.86 MG/DL (ref 0.55–1.02)
CREAT SERPL-MCNC: 0.87 MG/DL (ref 0.55–1.02)
CREAT SERPL-MCNC: 0.88 MG/DL (ref 0.55–1.02)
CREAT SERPL-MCNC: 0.89 MG/DL (ref 0.55–1.02)
CREAT SERPL-MCNC: 0.9 MG/DL (ref 0.55–1.02)
CREAT SERPL-MCNC: 0.91 MG/DL (ref 0.55–1.02)
CREAT SERPL-MCNC: 0.92 MG/DL (ref 0.55–1.02)
CREAT SERPL-MCNC: 0.96 MG/DL (ref 0.55–1.02)
CREAT SERPL-MCNC: 0.96 MG/DL (ref 0.55–1.02)
CREAT SERPL-MCNC: 1 MG/DL (ref 0.55–1.02)
CREAT SERPL-MCNC: 1.01 MG/DL (ref 0.55–1.02)
CREAT SERPL-MCNC: 1.05 MG/DL (ref 0.55–1.02)
CREAT SERPL-MCNC: 1.08 MG/DL (ref 0.55–1.02)
CREAT SERPL-MCNC: 1.09 MG/DL (ref 0.55–1.02)
CREAT SERPL-MCNC: 1.09 MG/DL (ref 0.55–1.02)
CREAT SERPL-MCNC: 1.1 MG/DL (ref 0.55–1.02)
CREAT SERPL-MCNC: 1.1 MG/DL (ref 0.55–1.02)
CREAT SERPL-MCNC: 1.14 MG/DL (ref 0.55–1.02)
CREAT SERPL-MCNC: 1.16 MG/DL (ref 0.55–1.02)
CREAT SERPL-MCNC: 1.19 MG/DL (ref 0.55–1.02)
CREAT SERPL-MCNC: 1.21 MG/DL (ref 0.55–1.02)
CREAT SERPL-MCNC: 1.22 MG/DL (ref 0.55–1.02)
CREAT SERPL-MCNC: 1.25 MG/DL (ref 0.55–1.02)
CREAT SERPL-MCNC: 1.33 MG/DL (ref 0.55–1.02)
CREAT SERPL-MCNC: 1.37 MG/DL (ref 0.55–1.02)
CREAT SERPL-MCNC: 1.4 MG/DL (ref 0.55–1.02)
CREAT SERPL-MCNC: 1.41 MG/DL (ref 0.55–1.02)
CREAT SERPL-MCNC: 1.43 MG/DL (ref 0.55–1.02)
CREAT SERPL-MCNC: 1.45 MG/DL (ref 0.55–1.02)
CREAT SERPL-MCNC: 1.47 MG/DL (ref 0.55–1.02)
CREAT SERPL-MCNC: 1.49 MG/DL (ref 0.55–1.02)
CREAT SERPL-MCNC: 1.53 MG/DL (ref 0.55–1.02)
CREAT SERPL-MCNC: 1.55 MG/DL (ref 0.55–1.02)
CREAT SERPL-MCNC: 1.62 MG/DL (ref 0.55–1.02)
CREAT SERPL-MCNC: 1.63 MG/DL (ref 0.55–1.02)
CREAT SERPL-MCNC: 1.64 MG/DL (ref 0.55–1.02)
CREAT SERPL-MCNC: 1.65 MG/DL (ref 0.55–1.02)
CREAT SERPL-MCNC: 1.67 MG/DL (ref 0.55–1.02)
CREAT SERPL-MCNC: 1.7 MG/DL (ref 0.55–1.02)
CREAT SERPL-MCNC: 1.7 MG/DL (ref 0.55–1.02)
CREAT SERPL-MCNC: 1.71 MG/DL (ref 0.55–1.02)
CREAT SERPL-MCNC: 1.73 MG/DL (ref 0.55–1.02)
CREAT SERPL-MCNC: 1.76 MG/DL (ref 0.55–1.02)
CREAT SERPL-MCNC: 1.76 MG/DL (ref 0.55–1.02)
CREAT SERPL-MCNC: 1.78 MG/DL (ref 0.55–1.02)
CREAT SERPL-MCNC: 1.87 MG/DL (ref 0.55–1.02)
CREAT SERPL-MCNC: 1.93 MG/DL (ref 0.55–1.02)
CREAT SERPL-MCNC: 2.27 MG/DL (ref 0.55–1.02)
CREAT SERPL-MCNC: 2.41 MG/DL (ref 0.55–1.02)
CREAT SERPL-MCNC: 2.61 MG/DL (ref 0.55–1.02)
CREAT SERPL-MCNC: 2.71 MG/DL (ref 0.55–1.02)
CREAT SERPL-MCNC: 2.9 MG/DL (ref 0.55–1.02)
CREAT SERPL-MCNC: 3 MG/DL (ref 0.55–1.02)
CREAT SERPL-MCNC: 3.09 MG/DL (ref 0.55–1.02)
CREAT SERPL-MCNC: 3.18 MG/DL (ref 0.55–1.02)
CROSSMATCH RESULT,%XM: NORMAL
CRP SERPL-MCNC: 10.1 MG/DL (ref 0–0.6)
CRP SERPL-MCNC: 10.2 MG/DL (ref 0–0.6)
CRP SERPL-MCNC: 10.3 MG/DL (ref 0–0.6)
CRP SERPL-MCNC: 10.9 MG/DL (ref 0–0.6)
CRP SERPL-MCNC: 11 MG/DL (ref 0–0.6)
CRP SERPL-MCNC: 11.2 MG/DL (ref 0–0.6)
CRP SERPL-MCNC: 11.4 MG/DL (ref 0–0.6)
CRP SERPL-MCNC: 11.5 MG/DL (ref 0–0.6)
CRP SERPL-MCNC: 12.6 MG/DL (ref 0–0.6)
CRP SERPL-MCNC: 13 MG/DL (ref 0–0.6)
CRP SERPL-MCNC: 13 MG/DL (ref 0–0.6)
CRP SERPL-MCNC: 13.1 MG/DL (ref 0–0.6)
CRP SERPL-MCNC: 13.2 MG/DL (ref 0–0.6)
CRP SERPL-MCNC: 13.6 MG/DL (ref 0–0.6)
CRP SERPL-MCNC: 13.9 MG/DL (ref 0–0.6)
CRP SERPL-MCNC: 14.2 MG/DL (ref 0–0.6)
CRP SERPL-MCNC: 14.5 MG/DL (ref 0–0.6)
CRP SERPL-MCNC: 14.6 MG/DL (ref 0–0.6)
CRP SERPL-MCNC: 15 MG/DL (ref 0–0.6)
CRP SERPL-MCNC: 15 MG/DL (ref 0–0.6)
CRP SERPL-MCNC: 15.1 MG/DL (ref 0–0.6)
CRP SERPL-MCNC: 15.7 MG/DL (ref 0–0.6)
CRP SERPL-MCNC: 15.8 MG/DL (ref 0–0.6)
CRP SERPL-MCNC: 15.8 MG/DL (ref 0–0.6)
CRP SERPL-MCNC: 16.1 MG/DL (ref 0–0.6)
CRP SERPL-MCNC: 17.1 MG/DL (ref 0–0.6)
CRP SERPL-MCNC: 17.9 MG/DL (ref 0–0.6)
CRP SERPL-MCNC: 18 MG/DL (ref 0–0.6)
CRP SERPL-MCNC: 18.6 MG/DL (ref 0–0.6)
CRP SERPL-MCNC: 18.9 MG/DL (ref 0–0.6)
CRP SERPL-MCNC: 18.9 MG/DL (ref 0–0.6)
CRP SERPL-MCNC: 2.33 MG/DL (ref 0–0.6)
CRP SERPL-MCNC: 2.79 MG/DL (ref 0–0.6)
CRP SERPL-MCNC: 21.4 MG/DL (ref 0–0.6)
CRP SERPL-MCNC: 28.4 MG/DL (ref 0–0.6)
CRP SERPL-MCNC: 3.14 MG/DL (ref 0–0.6)
CRP SERPL-MCNC: 3.44 MG/DL (ref 0–0.6)
CRP SERPL-MCNC: 4.94 MG/DL (ref 0–0.6)
CRP SERPL-MCNC: 5.89 MG/DL (ref 0–0.6)
CRP SERPL-MCNC: 6.3 MG/DL (ref 0–0.6)
CRP SERPL-MCNC: 6.55 MG/DL (ref 0–0.6)
CRP SERPL-MCNC: 7.05 MG/DL (ref 0–0.6)
CRP SERPL-MCNC: 7.65 MG/DL (ref 0–0.6)
CRP SERPL-MCNC: 7.68 MG/DL (ref 0–0.6)
CRP SERPL-MCNC: 7.79 MG/DL (ref 0–0.6)
CRP SERPL-MCNC: 7.99 MG/DL (ref 0–0.6)
CRP SERPL-MCNC: 8.28 MG/DL (ref 0–0.6)
CRP SERPL-MCNC: 8.61 MG/DL (ref 0–0.6)
DATE LAST DOSE: NORMAL
DIAGNOSIS, 93000: NORMAL
DIFFERENTIAL METHOD BLD: ABNORMAL
ECHO AO ROOT DIAM: 2.7 CM
ECHO AO ROOT DIAM: 2.7 CM
ECHO AO ROOT INDEX: 1.34 CM/M2
ECHO AO ROOT INDEX: 1.35 CM/M2
ECHO AV AREA PEAK VELOCITY: 2.7 CM2
ECHO AV AREA/BSA PEAK VELOCITY: 1.4 CM2/M2
ECHO AV MEAN GRADIENT: 4 MMHG
ECHO AV MEAN VELOCITY: 0.9 M/S
ECHO AV PEAK GRADIENT: 3 MMHG
ECHO AV PEAK GRADIENT: 6 MMHG
ECHO AV PEAK VELOCITY: 0.9 M/S
ECHO AV PEAK VELOCITY: 1.3 M/S
ECHO AV VELOCITY RATIO: 0.38
ECHO AV VELOCITY RATIO: 1
ECHO AV VTI: 24.5 CM
ECHO EST RA PRESSURE: 3 MMHG
ECHO LA AREA 2C: 13.9 CM2
ECHO LA AREA 4C: 13.6 CM2
ECHO LA AREA 4C: 14.3 CM2
ECHO LA DIAMETER INDEX: 1.5 CM/M2
ECHO LA DIAMETER INDEX: 1.63 CM/M2
ECHO LA DIAMETER INDEX: 1.83 CM/M2
ECHO LA DIAMETER: 3 CM
ECHO LA DIAMETER: 3.3 CM
ECHO LA DIAMETER: 3.5 CM
ECHO LA MAJOR AXIS: 5 CM
ECHO LA MAJOR AXIS: 5.3 CM
ECHO LA MINOR AXIS: 4.6 CM
ECHO LA TO AORTIC ROOT RATIO: 1.11
ECHO LA TO AORTIC ROOT RATIO: 1.22
ECHO LA VOL BP: 31 ML (ref 22–52)
ECHO LA VOL/BSA BIPLANE: 16 ML/M2 (ref 16–34)
ECHO LV E' LATERAL VELOCITY: 4 CM/S
ECHO LV E' LATERAL VELOCITY: 5 CM/S
ECHO LV E' SEPTAL VELOCITY: 4 CM/S
ECHO LV E' SEPTAL VELOCITY: 8 CM/S
ECHO LV EDV A4C: 129 ML
ECHO LV EDV A4C: 66 ML
ECHO LV EDV INDEX A4C: 35 ML/M2
ECHO LV EDV INDEX A4C: 64 ML/M2
ECHO LV EJECTION FRACTION A4C: 49 %
ECHO LV EJECTION FRACTION A4C: 8 %
ECHO LV EJECTION FRACTION BIPLANE: 45 % (ref 55–100)
ECHO LV ESV A4C: 61 ML
ECHO LV ESV A4C: 66 ML
ECHO LV ESV INDEX A4C: 32 ML/M2
ECHO LV ESV INDEX A4C: 33 ML/M2
ECHO LV FRACTIONAL SHORTENING: 13 % (ref 28–44)
ECHO LV FRACTIONAL SHORTENING: 23 % (ref 28–44)
ECHO LV FRACTIONAL SHORTENING: 33 % (ref 28–44)
ECHO LV INTERNAL DIMENSION DIASTOLE INDEX: 1.98 CM/M2
ECHO LV INTERNAL DIMENSION DIASTOLE INDEX: 2.15 CM/M2
ECHO LV INTERNAL DIMENSION DIASTOLE INDEX: 2.41 CM/M2
ECHO LV INTERNAL DIMENSION DIASTOLIC: 4 CM (ref 3.9–5.3)
ECHO LV INTERNAL DIMENSION DIASTOLIC: 4.3 CM (ref 3.9–5.3)
ECHO LV INTERNAL DIMENSION DIASTOLIC: 4.6 CM (ref 3.9–5.3)
ECHO LV INTERNAL DIMENSION SYSTOLIC INDEX: 1.45 CM/M2
ECHO LV INTERNAL DIMENSION SYSTOLIC INDEX: 1.53 CM/M2
ECHO LV INTERNAL DIMENSION SYSTOLIC INDEX: 2.09 CM/M2
ECHO LV INTERNAL DIMENSION SYSTOLIC: 2.9 CM
ECHO LV INTERNAL DIMENSION SYSTOLIC: 3.1 CM
ECHO LV INTERNAL DIMENSION SYSTOLIC: 4 CM
ECHO LV IVSD: 1.3 CM (ref 0.6–0.9)
ECHO LV IVSD: 1.4 CM (ref 0.6–0.9)
ECHO LV IVSD: 1.5 CM (ref 0.6–0.9)
ECHO LV MASS 2D: 230.2 G (ref 67–162)
ECHO LV MASS 2D: 232.2 G (ref 67–162)
ECHO LV MASS 2D: 232.7 G (ref 67–162)
ECHO LV MASS INDEX 2D: 115.2 G/M2 (ref 43–95)
ECHO LV MASS INDEX 2D: 116.1 G/M2 (ref 43–95)
ECHO LV MASS INDEX 2D: 120.5 G/M2 (ref 43–95)
ECHO LV POSTERIOR WALL DIASTOLIC: 1.3 CM (ref 0.6–0.9)
ECHO LV POSTERIOR WALL DIASTOLIC: 1.4 CM (ref 0.6–0.9)
ECHO LV POSTERIOR WALL DIASTOLIC: 1.5 CM (ref 0.6–0.9)
ECHO LV RELATIVE WALL THICKNESS RATIO: 0.57
ECHO LV RELATIVE WALL THICKNESS RATIO: 0.65
ECHO LV RELATIVE WALL THICKNESS RATIO: 0.75
ECHO LVOT AREA: 2.8 CM2
ECHO LVOT AV VTI INDEX: 0.43
ECHO LVOT DIAM: 1.9 CM
ECHO LVOT MEAN GRADIENT: 1 MMHG
ECHO LVOT PEAK GRADIENT: 1 MMHG
ECHO LVOT PEAK GRADIENT: 3 MMHG
ECHO LVOT PEAK VELOCITY: 0.5 M/S
ECHO LVOT PEAK VELOCITY: 0.9 M/S
ECHO LVOT VTI: 10.6 CM
ECHO MV A VELOCITY: 0.94 M/S
ECHO MV A VELOCITY: 1.03 M/S
ECHO MV E DECELERATION TIME (DT): 254 MS
ECHO MV E VELOCITY: 0.81 M/S
ECHO MV E VELOCITY: 1.37 M/S
ECHO MV E/A RATIO: 0.79
ECHO MV E/A RATIO: 1.46
ECHO MV E/E' LATERAL: 20.25
ECHO MV E/E' LATERAL: 27.4
ECHO MV E/E' RATIO (AVERAGED): 20.25
ECHO MV E/E' RATIO (AVERAGED): 22.26
ECHO MV E/E' SEPTAL: 17.13
ECHO MV E/E' SEPTAL: 20.25
ECHO MV LVOT VTI INDEX: 5.03
ECHO MV MAX VELOCITY: 1.4 M/S
ECHO MV MEAN GRADIENT: 2 MMHG
ECHO MV MEAN VELOCITY: 0.6 M/S
ECHO MV PEAK GRADIENT: 8 MMHG
ECHO MV REGURGITANT PEAK GRADIENT: 64 MMHG
ECHO MV REGURGITANT PEAK VELOCITY: 4 M/S
ECHO MV REGURGITANT VTIA: 132 CM
ECHO MV VTI: 53.3 CM
ECHO PULMONARY ARTERY END DIASTOLIC PRESSURE: 4 MMHG
ECHO PULMONARY ARTERY END DIASTOLIC PRESSURE: 4 MMHG
ECHO PV MAX VELOCITY: 0.7 M/S
ECHO PV MAX VELOCITY: 0.8 M/S
ECHO PV MEAN GRADIENT: 1 MMHG
ECHO PV MEAN VELOCITY: 0.5 M/S
ECHO PV PEAK GRADIENT: 2 MMHG
ECHO PV PEAK GRADIENT: 3 MMHG
ECHO PV REGURGITANT MAX VELOCITY: 1 M/S
ECHO PV REGURGITANT MAX VELOCITY: 1 M/S
ECHO PV VTI: 14.3 CM
ECHO RIGHT VENTRICULAR SYSTOLIC PRESSURE (RVSP): 35 MMHG
ECHO RV BASAL DIMENSION: 2.6 CM
ECHO RV FREE WALL PEAK S': 8 CM/S
ECHO RV INTERNAL DIMENSION: 3.6 CM
ECHO RV MID DIMENSION: 2.2 CM
ECHO RV TAPSE: 1.4 CM (ref 1.7–?)
ECHO TV REGURGITANT MAX VELOCITY: 2.82 M/S
ECHO TV REGURGITANT MAX VELOCITY: 3.04 M/S
ECHO TV REGURGITANT PEAK GRADIENT: 32 MMHG
ECHO TV REGURGITANT PEAK GRADIENT: 37 MMHG
EOSINOPHIL # BLD: 0 K/UL (ref 0–0.4)
EOSINOPHIL # BLD: 0.1 K/UL (ref 0–0.4)
EOSINOPHIL # BLD: 0.2 K/UL (ref 0–0.4)
EOSINOPHIL # BLD: 0.3 K/UL (ref 0–0.4)
EOSINOPHIL # BLD: 0.4 K/UL (ref 0–0.4)
EOSINOPHIL # BLD: 0.5 K/UL (ref 0–0.4)
EOSINOPHIL NFR BLD: 0 % (ref 0–7)
EOSINOPHIL NFR BLD: 1 % (ref 0–7)
EOSINOPHIL NFR BLD: 2 % (ref 0–7)
EOSINOPHIL NFR BLD: 3 % (ref 0–7)
EPAP/CPAP/PEEP, PAPEEP: 5
ERYTHROCYTE [DISTWIDTH] IN BLOOD BY AUTOMATED COUNT: 15.7 % (ref 11.5–14.5)
ERYTHROCYTE [DISTWIDTH] IN BLOOD BY AUTOMATED COUNT: 15.9 % (ref 11.5–14.5)
ERYTHROCYTE [DISTWIDTH] IN BLOOD BY AUTOMATED COUNT: 15.9 % (ref 11.5–14.5)
ERYTHROCYTE [DISTWIDTH] IN BLOOD BY AUTOMATED COUNT: 16 % (ref 11.5–14.5)
ERYTHROCYTE [DISTWIDTH] IN BLOOD BY AUTOMATED COUNT: 16.1 % (ref 11.5–14.5)
ERYTHROCYTE [DISTWIDTH] IN BLOOD BY AUTOMATED COUNT: 16.1 % (ref 11.5–14.5)
ERYTHROCYTE [DISTWIDTH] IN BLOOD BY AUTOMATED COUNT: 16.2 % (ref 11.5–14.5)
ERYTHROCYTE [DISTWIDTH] IN BLOOD BY AUTOMATED COUNT: 16.3 % (ref 11.5–14.5)
ERYTHROCYTE [DISTWIDTH] IN BLOOD BY AUTOMATED COUNT: 16.4 % (ref 11.5–14.5)
ERYTHROCYTE [DISTWIDTH] IN BLOOD BY AUTOMATED COUNT: 16.5 % (ref 11.5–14.5)
ERYTHROCYTE [DISTWIDTH] IN BLOOD BY AUTOMATED COUNT: 16.6 % (ref 11.5–14.5)
ERYTHROCYTE [DISTWIDTH] IN BLOOD BY AUTOMATED COUNT: 16.8 % (ref 11.5–14.5)
ERYTHROCYTE [DISTWIDTH] IN BLOOD BY AUTOMATED COUNT: 16.8 % (ref 11.5–14.5)
ERYTHROCYTE [DISTWIDTH] IN BLOOD BY AUTOMATED COUNT: 16.9 % (ref 11.5–14.5)
ERYTHROCYTE [DISTWIDTH] IN BLOOD BY AUTOMATED COUNT: 17 % (ref 11.5–14.5)
ERYTHROCYTE [DISTWIDTH] IN BLOOD BY AUTOMATED COUNT: 17.1 % (ref 11.5–14.5)
ERYTHROCYTE [DISTWIDTH] IN BLOOD BY AUTOMATED COUNT: 17.2 % (ref 11.5–14.5)
ERYTHROCYTE [DISTWIDTH] IN BLOOD BY AUTOMATED COUNT: 17.3 % (ref 11.5–14.5)
ERYTHROCYTE [DISTWIDTH] IN BLOOD BY AUTOMATED COUNT: 17.4 % (ref 11.5–14.5)
ERYTHROCYTE [DISTWIDTH] IN BLOOD BY AUTOMATED COUNT: 17.5 % (ref 11.5–14.5)
ERYTHROCYTE [DISTWIDTH] IN BLOOD BY AUTOMATED COUNT: 17.6 % (ref 11.5–14.5)
ERYTHROCYTE [DISTWIDTH] IN BLOOD BY AUTOMATED COUNT: 17.7 % (ref 11.5–14.5)
ERYTHROCYTE [DISTWIDTH] IN BLOOD BY AUTOMATED COUNT: 17.7 % (ref 11.5–14.5)
ERYTHROCYTE [DISTWIDTH] IN BLOOD BY AUTOMATED COUNT: 17.8 % (ref 11.5–14.5)
ERYTHROCYTE [DISTWIDTH] IN BLOOD BY AUTOMATED COUNT: 17.9 % (ref 11.5–14.5)
ERYTHROCYTE [DISTWIDTH] IN BLOOD BY AUTOMATED COUNT: 17.9 % (ref 11.5–14.5)
EST. AVERAGE GLUCOSE BLD GHB EST-MCNC: 143 MG/DL
EST. AVERAGE GLUCOSE BLD GHB EST-MCNC: 160 MG/DL
EST. AVERAGE GLUCOSE BLD GHB EST-MCNC: 174 MG/DL
FERRITIN SERPL-MCNC: ABNORMAL NG/ML (ref 26–388)
FIBRINOGEN PPP-MCNC: 741 MG/DL (ref 220–535)
FIO2 ON VENT: 100 %
FIO2 ON VENT: 21 %
FIO2 ON VENT: 30 %
FIO2 ON VENT: 60 %
GAS FLOW.O2 O2 DELIVERY SYS: 2 L/MIN
GAS FLOW.O2 O2 DELIVERY SYS: 3 L/MIN
GAS FLOW.O2 O2 DELIVERY SYS: 3 L/MIN
GAS FLOW.O2 SETTING OXYMISER: 10 L/MIN
GAS FLOW.O2 SETTING OXYMISER: 12 L/MIN
GAS FLOW.O2 SETTING OXYMISER: 16 L/MIN
GAS FLOW.O2 SETTING OXYMISER: 16 L/MIN
GLOBULIN SER CALC-MCNC: 3.9 G/DL (ref 2–4)
GLOBULIN SER CALC-MCNC: 4.1 G/DL (ref 2–4)
GLOBULIN SER CALC-MCNC: 4.2 G/DL (ref 2–4)
GLOBULIN SER CALC-MCNC: 4.2 G/DL (ref 2–4)
GLOBULIN SER CALC-MCNC: 4.3 G/DL (ref 2–4)
GLOBULIN SER CALC-MCNC: 4.4 G/DL (ref 2–4)
GLOBULIN SER CALC-MCNC: 4.5 G/DL (ref 2–4)
GLOBULIN SER CALC-MCNC: 4.5 G/DL (ref 2–4)
GLOBULIN SER CALC-MCNC: 4.6 G/DL (ref 2–4)
GLOBULIN SER CALC-MCNC: 4.7 G/DL (ref 2–4)
GLOBULIN SER CALC-MCNC: 4.8 G/DL (ref 2–4)
GLOBULIN SER CALC-MCNC: 4.9 G/DL (ref 2–4)
GLOBULIN SER CALC-MCNC: 5 G/DL (ref 2–4)
GLOBULIN SER CALC-MCNC: 5.1 G/DL (ref 2–4)
GLOBULIN SER CALC-MCNC: 5.2 G/DL (ref 2–4)
GLOBULIN SER CALC-MCNC: 5.3 G/DL (ref 2–4)
GLOBULIN SER CALC-MCNC: 5.4 G/DL (ref 2–4)
GLOBULIN SER CALC-MCNC: 5.4 G/DL (ref 2–4)
GLOBULIN SER CALC-MCNC: 5.5 G/DL (ref 2–4)
GLOBULIN SER CALC-MCNC: 5.6 G/DL (ref 2–4)
GLOBULIN SER CALC-MCNC: 5.6 G/DL (ref 2–4)
GLOBULIN SER CALC-MCNC: 5.7 G/DL (ref 2–4)
GLOBULIN SER CALC-MCNC: 5.7 G/DL (ref 2–4)
GLOBULIN SER CALC-MCNC: 5.8 G/DL (ref 2–4)
GLOBULIN SER CALC-MCNC: 5.9 G/DL (ref 2–4)
GLOBULIN SER CALC-MCNC: 5.9 G/DL (ref 2–4)
GLOBULIN SER CALC-MCNC: 6 G/DL (ref 2–4)
GLOBULIN SER CALC-MCNC: 6.1 G/DL (ref 2–4)
GLUCOSE BLD STRIP.AUTO-MCNC: 100 MG/DL (ref 65–117)
GLUCOSE BLD STRIP.AUTO-MCNC: 104 MG/DL (ref 65–117)
GLUCOSE BLD STRIP.AUTO-MCNC: 105 MG/DL (ref 65–117)
GLUCOSE BLD STRIP.AUTO-MCNC: 105 MG/DL (ref 65–117)
GLUCOSE BLD STRIP.AUTO-MCNC: 107 MG/DL (ref 65–117)
GLUCOSE BLD STRIP.AUTO-MCNC: 108 MG/DL (ref 65–117)
GLUCOSE BLD STRIP.AUTO-MCNC: 109 MG/DL (ref 65–117)
GLUCOSE BLD STRIP.AUTO-MCNC: 112 MG/DL (ref 65–117)
GLUCOSE BLD STRIP.AUTO-MCNC: 113 MG/DL (ref 65–117)
GLUCOSE BLD STRIP.AUTO-MCNC: 117 MG/DL (ref 65–117)
GLUCOSE BLD STRIP.AUTO-MCNC: 118 MG/DL (ref 65–117)
GLUCOSE BLD STRIP.AUTO-MCNC: 120 MG/DL (ref 65–117)
GLUCOSE BLD STRIP.AUTO-MCNC: 121 MG/DL (ref 65–117)
GLUCOSE BLD STRIP.AUTO-MCNC: 123 MG/DL (ref 65–117)
GLUCOSE BLD STRIP.AUTO-MCNC: 125 MG/DL (ref 65–117)
GLUCOSE BLD STRIP.AUTO-MCNC: 127 MG/DL (ref 65–117)
GLUCOSE BLD STRIP.AUTO-MCNC: 127 MG/DL (ref 65–117)
GLUCOSE BLD STRIP.AUTO-MCNC: 128 MG/DL (ref 65–117)
GLUCOSE BLD STRIP.AUTO-MCNC: 130 MG/DL (ref 65–117)
GLUCOSE BLD STRIP.AUTO-MCNC: 132 MG/DL (ref 65–117)
GLUCOSE BLD STRIP.AUTO-MCNC: 132 MG/DL (ref 65–117)
GLUCOSE BLD STRIP.AUTO-MCNC: 134 MG/DL (ref 65–117)
GLUCOSE BLD STRIP.AUTO-MCNC: 135 MG/DL (ref 65–117)
GLUCOSE BLD STRIP.AUTO-MCNC: 137 MG/DL (ref 65–117)
GLUCOSE BLD STRIP.AUTO-MCNC: 140 MG/DL (ref 65–117)
GLUCOSE BLD STRIP.AUTO-MCNC: 141 MG/DL (ref 65–117)
GLUCOSE BLD STRIP.AUTO-MCNC: 142 MG/DL (ref 65–117)
GLUCOSE BLD STRIP.AUTO-MCNC: 142 MG/DL (ref 65–117)
GLUCOSE BLD STRIP.AUTO-MCNC: 144 MG/DL (ref 65–117)
GLUCOSE BLD STRIP.AUTO-MCNC: 145 MG/DL (ref 65–117)
GLUCOSE BLD STRIP.AUTO-MCNC: 146 MG/DL (ref 65–117)
GLUCOSE BLD STRIP.AUTO-MCNC: 147 MG/DL (ref 65–117)
GLUCOSE BLD STRIP.AUTO-MCNC: 148 MG/DL (ref 65–117)
GLUCOSE BLD STRIP.AUTO-MCNC: 150 MG/DL (ref 65–117)
GLUCOSE BLD STRIP.AUTO-MCNC: 150 MG/DL (ref 65–117)
GLUCOSE BLD STRIP.AUTO-MCNC: 152 MG/DL (ref 65–117)
GLUCOSE BLD STRIP.AUTO-MCNC: 153 MG/DL (ref 65–117)
GLUCOSE BLD STRIP.AUTO-MCNC: 153 MG/DL (ref 65–117)
GLUCOSE BLD STRIP.AUTO-MCNC: 154 MG/DL (ref 65–117)
GLUCOSE BLD STRIP.AUTO-MCNC: 154 MG/DL (ref 65–117)
GLUCOSE BLD STRIP.AUTO-MCNC: 155 MG/DL (ref 65–117)
GLUCOSE BLD STRIP.AUTO-MCNC: 156 MG/DL (ref 65–117)
GLUCOSE BLD STRIP.AUTO-MCNC: 156 MG/DL (ref 65–117)
GLUCOSE BLD STRIP.AUTO-MCNC: 157 MG/DL (ref 65–117)
GLUCOSE BLD STRIP.AUTO-MCNC: 157 MG/DL (ref 65–117)
GLUCOSE BLD STRIP.AUTO-MCNC: 158 MG/DL (ref 65–117)
GLUCOSE BLD STRIP.AUTO-MCNC: 159 MG/DL (ref 65–117)
GLUCOSE BLD STRIP.AUTO-MCNC: 159 MG/DL (ref 65–117)
GLUCOSE BLD STRIP.AUTO-MCNC: 161 MG/DL (ref 65–117)
GLUCOSE BLD STRIP.AUTO-MCNC: 162 MG/DL (ref 65–117)
GLUCOSE BLD STRIP.AUTO-MCNC: 162 MG/DL (ref 65–117)
GLUCOSE BLD STRIP.AUTO-MCNC: 164 MG/DL (ref 65–117)
GLUCOSE BLD STRIP.AUTO-MCNC: 165 MG/DL (ref 65–117)
GLUCOSE BLD STRIP.AUTO-MCNC: 165 MG/DL (ref 65–117)
GLUCOSE BLD STRIP.AUTO-MCNC: 166 MG/DL (ref 65–117)
GLUCOSE BLD STRIP.AUTO-MCNC: 167 MG/DL (ref 65–117)
GLUCOSE BLD STRIP.AUTO-MCNC: 168 MG/DL (ref 65–117)
GLUCOSE BLD STRIP.AUTO-MCNC: 168 MG/DL (ref 65–117)
GLUCOSE BLD STRIP.AUTO-MCNC: 169 MG/DL (ref 65–117)
GLUCOSE BLD STRIP.AUTO-MCNC: 170 MG/DL (ref 65–117)
GLUCOSE BLD STRIP.AUTO-MCNC: 170 MG/DL (ref 65–117)
GLUCOSE BLD STRIP.AUTO-MCNC: 171 MG/DL (ref 65–117)
GLUCOSE BLD STRIP.AUTO-MCNC: 171 MG/DL (ref 65–117)
GLUCOSE BLD STRIP.AUTO-MCNC: 172 MG/DL (ref 65–117)
GLUCOSE BLD STRIP.AUTO-MCNC: 172 MG/DL (ref 65–117)
GLUCOSE BLD STRIP.AUTO-MCNC: 173 MG/DL (ref 65–117)
GLUCOSE BLD STRIP.AUTO-MCNC: 173 MG/DL (ref 65–117)
GLUCOSE BLD STRIP.AUTO-MCNC: 174 MG/DL (ref 65–117)
GLUCOSE BLD STRIP.AUTO-MCNC: 175 MG/DL (ref 65–117)
GLUCOSE BLD STRIP.AUTO-MCNC: 176 MG/DL (ref 65–117)
GLUCOSE BLD STRIP.AUTO-MCNC: 177 MG/DL (ref 65–117)
GLUCOSE BLD STRIP.AUTO-MCNC: 178 MG/DL (ref 65–117)
GLUCOSE BLD STRIP.AUTO-MCNC: 179 MG/DL (ref 65–117)
GLUCOSE BLD STRIP.AUTO-MCNC: 180 MG/DL (ref 65–117)
GLUCOSE BLD STRIP.AUTO-MCNC: 180 MG/DL (ref 65–117)
GLUCOSE BLD STRIP.AUTO-MCNC: 181 MG/DL (ref 65–117)
GLUCOSE BLD STRIP.AUTO-MCNC: 182 MG/DL (ref 65–117)
GLUCOSE BLD STRIP.AUTO-MCNC: 183 MG/DL (ref 65–117)
GLUCOSE BLD STRIP.AUTO-MCNC: 184 MG/DL (ref 65–117)
GLUCOSE BLD STRIP.AUTO-MCNC: 185 MG/DL (ref 65–117)
GLUCOSE BLD STRIP.AUTO-MCNC: 186 MG/DL (ref 65–117)
GLUCOSE BLD STRIP.AUTO-MCNC: 187 MG/DL (ref 65–117)
GLUCOSE BLD STRIP.AUTO-MCNC: 187 MG/DL (ref 65–117)
GLUCOSE BLD STRIP.AUTO-MCNC: 188 MG/DL (ref 65–117)
GLUCOSE BLD STRIP.AUTO-MCNC: 188 MG/DL (ref 65–117)
GLUCOSE BLD STRIP.AUTO-MCNC: 189 MG/DL (ref 65–117)
GLUCOSE BLD STRIP.AUTO-MCNC: 189 MG/DL (ref 65–117)
GLUCOSE BLD STRIP.AUTO-MCNC: 190 MG/DL (ref 65–117)
GLUCOSE BLD STRIP.AUTO-MCNC: 190 MG/DL (ref 65–117)
GLUCOSE BLD STRIP.AUTO-MCNC: 191 MG/DL (ref 65–117)
GLUCOSE BLD STRIP.AUTO-MCNC: 192 MG/DL (ref 65–117)
GLUCOSE BLD STRIP.AUTO-MCNC: 193 MG/DL (ref 65–117)
GLUCOSE BLD STRIP.AUTO-MCNC: 193 MG/DL (ref 65–117)
GLUCOSE BLD STRIP.AUTO-MCNC: 194 MG/DL (ref 65–117)
GLUCOSE BLD STRIP.AUTO-MCNC: 195 MG/DL (ref 65–117)
GLUCOSE BLD STRIP.AUTO-MCNC: 196 MG/DL (ref 65–117)
GLUCOSE BLD STRIP.AUTO-MCNC: 197 MG/DL (ref 65–117)
GLUCOSE BLD STRIP.AUTO-MCNC: 199 MG/DL (ref 65–117)
GLUCOSE BLD STRIP.AUTO-MCNC: 200 MG/DL (ref 65–117)
GLUCOSE BLD STRIP.AUTO-MCNC: 201 MG/DL (ref 65–117)
GLUCOSE BLD STRIP.AUTO-MCNC: 202 MG/DL (ref 65–117)
GLUCOSE BLD STRIP.AUTO-MCNC: 203 MG/DL (ref 65–117)
GLUCOSE BLD STRIP.AUTO-MCNC: 205 MG/DL (ref 65–117)
GLUCOSE BLD STRIP.AUTO-MCNC: 205 MG/DL (ref 65–117)
GLUCOSE BLD STRIP.AUTO-MCNC: 206 MG/DL (ref 65–117)
GLUCOSE BLD STRIP.AUTO-MCNC: 206 MG/DL (ref 65–117)
GLUCOSE BLD STRIP.AUTO-MCNC: 207 MG/DL (ref 65–117)
GLUCOSE BLD STRIP.AUTO-MCNC: 207 MG/DL (ref 65–117)
GLUCOSE BLD STRIP.AUTO-MCNC: 208 MG/DL (ref 65–117)
GLUCOSE BLD STRIP.AUTO-MCNC: 209 MG/DL (ref 65–117)
GLUCOSE BLD STRIP.AUTO-MCNC: 210 MG/DL (ref 65–117)
GLUCOSE BLD STRIP.AUTO-MCNC: 211 MG/DL (ref 65–117)
GLUCOSE BLD STRIP.AUTO-MCNC: 212 MG/DL (ref 65–117)
GLUCOSE BLD STRIP.AUTO-MCNC: 213 MG/DL (ref 65–117)
GLUCOSE BLD STRIP.AUTO-MCNC: 213 MG/DL (ref 65–117)
GLUCOSE BLD STRIP.AUTO-MCNC: 214 MG/DL (ref 65–117)
GLUCOSE BLD STRIP.AUTO-MCNC: 215 MG/DL (ref 65–117)
GLUCOSE BLD STRIP.AUTO-MCNC: 216 MG/DL (ref 65–117)
GLUCOSE BLD STRIP.AUTO-MCNC: 216 MG/DL (ref 65–117)
GLUCOSE BLD STRIP.AUTO-MCNC: 217 MG/DL (ref 65–117)
GLUCOSE BLD STRIP.AUTO-MCNC: 218 MG/DL (ref 65–117)
GLUCOSE BLD STRIP.AUTO-MCNC: 219 MG/DL (ref 65–117)
GLUCOSE BLD STRIP.AUTO-MCNC: 219 MG/DL (ref 65–117)
GLUCOSE BLD STRIP.AUTO-MCNC: 220 MG/DL (ref 65–117)
GLUCOSE BLD STRIP.AUTO-MCNC: 221 MG/DL (ref 65–117)
GLUCOSE BLD STRIP.AUTO-MCNC: 222 MG/DL (ref 65–117)
GLUCOSE BLD STRIP.AUTO-MCNC: 223 MG/DL (ref 65–117)
GLUCOSE BLD STRIP.AUTO-MCNC: 223 MG/DL (ref 65–117)
GLUCOSE BLD STRIP.AUTO-MCNC: 224 MG/DL (ref 65–117)
GLUCOSE BLD STRIP.AUTO-MCNC: 225 MG/DL (ref 65–117)
GLUCOSE BLD STRIP.AUTO-MCNC: 226 MG/DL (ref 65–117)
GLUCOSE BLD STRIP.AUTO-MCNC: 226 MG/DL (ref 65–117)
GLUCOSE BLD STRIP.AUTO-MCNC: 227 MG/DL (ref 65–117)
GLUCOSE BLD STRIP.AUTO-MCNC: 228 MG/DL (ref 65–117)
GLUCOSE BLD STRIP.AUTO-MCNC: 228 MG/DL (ref 65–117)
GLUCOSE BLD STRIP.AUTO-MCNC: 229 MG/DL (ref 65–117)
GLUCOSE BLD STRIP.AUTO-MCNC: 230 MG/DL (ref 65–117)
GLUCOSE BLD STRIP.AUTO-MCNC: 231 MG/DL (ref 65–117)
GLUCOSE BLD STRIP.AUTO-MCNC: 231 MG/DL (ref 65–117)
GLUCOSE BLD STRIP.AUTO-MCNC: 232 MG/DL (ref 65–117)
GLUCOSE BLD STRIP.AUTO-MCNC: 233 MG/DL (ref 65–117)
GLUCOSE BLD STRIP.AUTO-MCNC: 233 MG/DL (ref 65–117)
GLUCOSE BLD STRIP.AUTO-MCNC: 234 MG/DL (ref 65–117)
GLUCOSE BLD STRIP.AUTO-MCNC: 235 MG/DL (ref 65–117)
GLUCOSE BLD STRIP.AUTO-MCNC: 235 MG/DL (ref 65–117)
GLUCOSE BLD STRIP.AUTO-MCNC: 236 MG/DL (ref 65–117)
GLUCOSE BLD STRIP.AUTO-MCNC: 237 MG/DL (ref 65–117)
GLUCOSE BLD STRIP.AUTO-MCNC: 237 MG/DL (ref 65–117)
GLUCOSE BLD STRIP.AUTO-MCNC: 239 MG/DL (ref 65–117)
GLUCOSE BLD STRIP.AUTO-MCNC: 240 MG/DL (ref 65–117)
GLUCOSE BLD STRIP.AUTO-MCNC: 241 MG/DL (ref 65–117)
GLUCOSE BLD STRIP.AUTO-MCNC: 241 MG/DL (ref 65–117)
GLUCOSE BLD STRIP.AUTO-MCNC: 242 MG/DL (ref 65–117)
GLUCOSE BLD STRIP.AUTO-MCNC: 243 MG/DL (ref 65–117)
GLUCOSE BLD STRIP.AUTO-MCNC: 243 MG/DL (ref 65–117)
GLUCOSE BLD STRIP.AUTO-MCNC: 244 MG/DL (ref 65–117)
GLUCOSE BLD STRIP.AUTO-MCNC: 246 MG/DL (ref 65–117)
GLUCOSE BLD STRIP.AUTO-MCNC: 247 MG/DL (ref 65–117)
GLUCOSE BLD STRIP.AUTO-MCNC: 247 MG/DL (ref 65–117)
GLUCOSE BLD STRIP.AUTO-MCNC: 248 MG/DL (ref 65–117)
GLUCOSE BLD STRIP.AUTO-MCNC: 249 MG/DL (ref 65–117)
GLUCOSE BLD STRIP.AUTO-MCNC: 250 MG/DL (ref 65–117)
GLUCOSE BLD STRIP.AUTO-MCNC: 250 MG/DL (ref 65–117)
GLUCOSE BLD STRIP.AUTO-MCNC: 251 MG/DL (ref 65–117)
GLUCOSE BLD STRIP.AUTO-MCNC: 252 MG/DL (ref 65–117)
GLUCOSE BLD STRIP.AUTO-MCNC: 253 MG/DL (ref 65–117)
GLUCOSE BLD STRIP.AUTO-MCNC: 254 MG/DL (ref 65–117)
GLUCOSE BLD STRIP.AUTO-MCNC: 255 MG/DL (ref 65–117)
GLUCOSE BLD STRIP.AUTO-MCNC: 256 MG/DL (ref 65–117)
GLUCOSE BLD STRIP.AUTO-MCNC: 257 MG/DL (ref 65–117)
GLUCOSE BLD STRIP.AUTO-MCNC: 257 MG/DL (ref 65–117)
GLUCOSE BLD STRIP.AUTO-MCNC: 258 MG/DL (ref 65–117)
GLUCOSE BLD STRIP.AUTO-MCNC: 259 MG/DL (ref 65–117)
GLUCOSE BLD STRIP.AUTO-MCNC: 259 MG/DL (ref 65–117)
GLUCOSE BLD STRIP.AUTO-MCNC: 260 MG/DL (ref 65–117)
GLUCOSE BLD STRIP.AUTO-MCNC: 260 MG/DL (ref 65–117)
GLUCOSE BLD STRIP.AUTO-MCNC: 261 MG/DL (ref 65–117)
GLUCOSE BLD STRIP.AUTO-MCNC: 261 MG/DL (ref 65–117)
GLUCOSE BLD STRIP.AUTO-MCNC: 262 MG/DL (ref 65–117)
GLUCOSE BLD STRIP.AUTO-MCNC: 264 MG/DL (ref 65–117)
GLUCOSE BLD STRIP.AUTO-MCNC: 264 MG/DL (ref 65–117)
GLUCOSE BLD STRIP.AUTO-MCNC: 265 MG/DL (ref 65–117)
GLUCOSE BLD STRIP.AUTO-MCNC: 265 MG/DL (ref 65–117)
GLUCOSE BLD STRIP.AUTO-MCNC: 266 MG/DL (ref 65–117)
GLUCOSE BLD STRIP.AUTO-MCNC: 267 MG/DL (ref 65–117)
GLUCOSE BLD STRIP.AUTO-MCNC: 268 MG/DL (ref 65–117)
GLUCOSE BLD STRIP.AUTO-MCNC: 269 MG/DL (ref 65–117)
GLUCOSE BLD STRIP.AUTO-MCNC: 270 MG/DL (ref 65–117)
GLUCOSE BLD STRIP.AUTO-MCNC: 270 MG/DL (ref 65–117)
GLUCOSE BLD STRIP.AUTO-MCNC: 272 MG/DL (ref 65–117)
GLUCOSE BLD STRIP.AUTO-MCNC: 275 MG/DL (ref 65–117)
GLUCOSE BLD STRIP.AUTO-MCNC: 276 MG/DL (ref 65–117)
GLUCOSE BLD STRIP.AUTO-MCNC: 276 MG/DL (ref 65–117)
GLUCOSE BLD STRIP.AUTO-MCNC: 277 MG/DL (ref 65–117)
GLUCOSE BLD STRIP.AUTO-MCNC: 277 MG/DL (ref 65–117)
GLUCOSE BLD STRIP.AUTO-MCNC: 278 MG/DL (ref 65–117)
GLUCOSE BLD STRIP.AUTO-MCNC: 278 MG/DL (ref 65–117)
GLUCOSE BLD STRIP.AUTO-MCNC: 279 MG/DL (ref 65–117)
GLUCOSE BLD STRIP.AUTO-MCNC: 280 MG/DL (ref 65–117)
GLUCOSE BLD STRIP.AUTO-MCNC: 281 MG/DL (ref 65–117)
GLUCOSE BLD STRIP.AUTO-MCNC: 283 MG/DL (ref 65–117)
GLUCOSE BLD STRIP.AUTO-MCNC: 283 MG/DL (ref 65–117)
GLUCOSE BLD STRIP.AUTO-MCNC: 285 MG/DL (ref 65–117)
GLUCOSE BLD STRIP.AUTO-MCNC: 286 MG/DL (ref 65–117)
GLUCOSE BLD STRIP.AUTO-MCNC: 287 MG/DL (ref 65–117)
GLUCOSE BLD STRIP.AUTO-MCNC: 288 MG/DL (ref 65–117)
GLUCOSE BLD STRIP.AUTO-MCNC: 288 MG/DL (ref 65–117)
GLUCOSE BLD STRIP.AUTO-MCNC: 289 MG/DL (ref 65–117)
GLUCOSE BLD STRIP.AUTO-MCNC: 289 MG/DL (ref 65–117)
GLUCOSE BLD STRIP.AUTO-MCNC: 290 MG/DL (ref 65–117)
GLUCOSE BLD STRIP.AUTO-MCNC: 290 MG/DL (ref 65–117)
GLUCOSE BLD STRIP.AUTO-MCNC: 291 MG/DL (ref 65–117)
GLUCOSE BLD STRIP.AUTO-MCNC: 292 MG/DL (ref 65–117)
GLUCOSE BLD STRIP.AUTO-MCNC: 293 MG/DL (ref 65–117)
GLUCOSE BLD STRIP.AUTO-MCNC: 294 MG/DL (ref 65–117)
GLUCOSE BLD STRIP.AUTO-MCNC: 295 MG/DL (ref 65–117)
GLUCOSE BLD STRIP.AUTO-MCNC: 295 MG/DL (ref 65–117)
GLUCOSE BLD STRIP.AUTO-MCNC: 296 MG/DL (ref 65–117)
GLUCOSE BLD STRIP.AUTO-MCNC: 296 MG/DL (ref 65–117)
GLUCOSE BLD STRIP.AUTO-MCNC: 299 MG/DL (ref 65–117)
GLUCOSE BLD STRIP.AUTO-MCNC: 299 MG/DL (ref 65–117)
GLUCOSE BLD STRIP.AUTO-MCNC: 300 MG/DL (ref 65–117)
GLUCOSE BLD STRIP.AUTO-MCNC: 300 MG/DL (ref 65–117)
GLUCOSE BLD STRIP.AUTO-MCNC: 301 MG/DL (ref 65–117)
GLUCOSE BLD STRIP.AUTO-MCNC: 301 MG/DL (ref 65–117)
GLUCOSE BLD STRIP.AUTO-MCNC: 302 MG/DL (ref 65–117)
GLUCOSE BLD STRIP.AUTO-MCNC: 303 MG/DL (ref 65–117)
GLUCOSE BLD STRIP.AUTO-MCNC: 303 MG/DL (ref 65–117)
GLUCOSE BLD STRIP.AUTO-MCNC: 304 MG/DL (ref 65–117)
GLUCOSE BLD STRIP.AUTO-MCNC: 305 MG/DL (ref 65–117)
GLUCOSE BLD STRIP.AUTO-MCNC: 306 MG/DL (ref 65–117)
GLUCOSE BLD STRIP.AUTO-MCNC: 307 MG/DL (ref 65–117)
GLUCOSE BLD STRIP.AUTO-MCNC: 308 MG/DL (ref 65–117)
GLUCOSE BLD STRIP.AUTO-MCNC: 309 MG/DL (ref 65–117)
GLUCOSE BLD STRIP.AUTO-MCNC: 310 MG/DL (ref 65–117)
GLUCOSE BLD STRIP.AUTO-MCNC: 311 MG/DL (ref 65–117)
GLUCOSE BLD STRIP.AUTO-MCNC: 312 MG/DL (ref 65–117)
GLUCOSE BLD STRIP.AUTO-MCNC: 313 MG/DL (ref 65–117)
GLUCOSE BLD STRIP.AUTO-MCNC: 314 MG/DL (ref 65–117)
GLUCOSE BLD STRIP.AUTO-MCNC: 315 MG/DL (ref 65–117)
GLUCOSE BLD STRIP.AUTO-MCNC: 316 MG/DL (ref 65–117)
GLUCOSE BLD STRIP.AUTO-MCNC: 316 MG/DL (ref 65–117)
GLUCOSE BLD STRIP.AUTO-MCNC: 317 MG/DL (ref 65–117)
GLUCOSE BLD STRIP.AUTO-MCNC: 318 MG/DL (ref 65–117)
GLUCOSE BLD STRIP.AUTO-MCNC: 320 MG/DL (ref 65–117)
GLUCOSE BLD STRIP.AUTO-MCNC: 321 MG/DL (ref 65–117)
GLUCOSE BLD STRIP.AUTO-MCNC: 321 MG/DL (ref 65–117)
GLUCOSE BLD STRIP.AUTO-MCNC: 322 MG/DL (ref 65–117)
GLUCOSE BLD STRIP.AUTO-MCNC: 323 MG/DL (ref 65–117)
GLUCOSE BLD STRIP.AUTO-MCNC: 324 MG/DL (ref 65–117)
GLUCOSE BLD STRIP.AUTO-MCNC: 326 MG/DL (ref 65–117)
GLUCOSE BLD STRIP.AUTO-MCNC: 326 MG/DL (ref 65–117)
GLUCOSE BLD STRIP.AUTO-MCNC: 327 MG/DL (ref 65–117)
GLUCOSE BLD STRIP.AUTO-MCNC: 330 MG/DL (ref 65–117)
GLUCOSE BLD STRIP.AUTO-MCNC: 332 MG/DL (ref 65–117)
GLUCOSE BLD STRIP.AUTO-MCNC: 333 MG/DL (ref 65–117)
GLUCOSE BLD STRIP.AUTO-MCNC: 339 MG/DL (ref 65–117)
GLUCOSE BLD STRIP.AUTO-MCNC: 340 MG/DL (ref 65–117)
GLUCOSE BLD STRIP.AUTO-MCNC: 343 MG/DL (ref 65–117)
GLUCOSE BLD STRIP.AUTO-MCNC: 344 MG/DL (ref 65–117)
GLUCOSE BLD STRIP.AUTO-MCNC: 347 MG/DL (ref 65–117)
GLUCOSE BLD STRIP.AUTO-MCNC: 347 MG/DL (ref 65–117)
GLUCOSE BLD STRIP.AUTO-MCNC: 348 MG/DL (ref 65–117)
GLUCOSE BLD STRIP.AUTO-MCNC: 348 MG/DL (ref 65–117)
GLUCOSE BLD STRIP.AUTO-MCNC: 351 MG/DL (ref 65–117)
GLUCOSE BLD STRIP.AUTO-MCNC: 353 MG/DL (ref 65–117)
GLUCOSE BLD STRIP.AUTO-MCNC: 353 MG/DL (ref 65–117)
GLUCOSE BLD STRIP.AUTO-MCNC: 354 MG/DL (ref 65–117)
GLUCOSE BLD STRIP.AUTO-MCNC: 355 MG/DL (ref 65–117)
GLUCOSE BLD STRIP.AUTO-MCNC: 356 MG/DL (ref 65–117)
GLUCOSE BLD STRIP.AUTO-MCNC: 357 MG/DL (ref 65–117)
GLUCOSE BLD STRIP.AUTO-MCNC: 358 MG/DL (ref 65–117)
GLUCOSE BLD STRIP.AUTO-MCNC: 359 MG/DL (ref 65–117)
GLUCOSE BLD STRIP.AUTO-MCNC: 361 MG/DL (ref 65–117)
GLUCOSE BLD STRIP.AUTO-MCNC: 362 MG/DL (ref 65–117)
GLUCOSE BLD STRIP.AUTO-MCNC: 366 MG/DL (ref 65–117)
GLUCOSE BLD STRIP.AUTO-MCNC: 368 MG/DL (ref 65–117)
GLUCOSE BLD STRIP.AUTO-MCNC: 368 MG/DL (ref 65–117)
GLUCOSE BLD STRIP.AUTO-MCNC: 370 MG/DL (ref 65–117)
GLUCOSE BLD STRIP.AUTO-MCNC: 376 MG/DL (ref 65–117)
GLUCOSE BLD STRIP.AUTO-MCNC: 380 MG/DL (ref 65–117)
GLUCOSE BLD STRIP.AUTO-MCNC: 403 MG/DL (ref 65–117)
GLUCOSE BLD STRIP.AUTO-MCNC: 405 MG/DL (ref 65–117)
GLUCOSE BLD STRIP.AUTO-MCNC: 407 MG/DL (ref 65–117)
GLUCOSE BLD STRIP.AUTO-MCNC: 55 MG/DL (ref 65–117)
GLUCOSE BLD STRIP.AUTO-MCNC: 59 MG/DL (ref 65–117)
GLUCOSE BLD STRIP.AUTO-MCNC: 66 MG/DL (ref 65–117)
GLUCOSE BLD STRIP.AUTO-MCNC: 67 MG/DL (ref 65–117)
GLUCOSE BLD STRIP.AUTO-MCNC: 70 MG/DL (ref 65–117)
GLUCOSE BLD STRIP.AUTO-MCNC: 77 MG/DL (ref 65–117)
GLUCOSE BLD STRIP.AUTO-MCNC: 79 MG/DL (ref 65–117)
GLUCOSE BLD STRIP.AUTO-MCNC: 79 MG/DL (ref 65–117)
GLUCOSE BLD STRIP.AUTO-MCNC: 82 MG/DL (ref 65–117)
GLUCOSE BLD STRIP.AUTO-MCNC: 85 MG/DL (ref 65–117)
GLUCOSE BLD STRIP.AUTO-MCNC: 85 MG/DL (ref 65–117)
GLUCOSE BLD STRIP.AUTO-MCNC: 88 MG/DL (ref 65–117)
GLUCOSE BLD STRIP.AUTO-MCNC: 91 MG/DL (ref 65–117)
GLUCOSE BLD STRIP.AUTO-MCNC: 91 MG/DL (ref 65–117)
GLUCOSE BLD STRIP.AUTO-MCNC: 94 MG/DL (ref 65–117)
GLUCOSE BLD STRIP.AUTO-MCNC: 94 MG/DL (ref 65–117)
GLUCOSE BLD STRIP.AUTO-MCNC: 97 MG/DL (ref 65–117)
GLUCOSE BLD STRIP.AUTO-MCNC: 99 MG/DL (ref 65–117)
GLUCOSE SERPL-MCNC: 104 MG/DL (ref 65–100)
GLUCOSE SERPL-MCNC: 108 MG/DL (ref 65–100)
GLUCOSE SERPL-MCNC: 114 MG/DL (ref 65–100)
GLUCOSE SERPL-MCNC: 122 MG/DL (ref 65–100)
GLUCOSE SERPL-MCNC: 126 MG/DL (ref 65–100)
GLUCOSE SERPL-MCNC: 130 MG/DL (ref 65–100)
GLUCOSE SERPL-MCNC: 142 MG/DL (ref 65–100)
GLUCOSE SERPL-MCNC: 142 MG/DL (ref 65–100)
GLUCOSE SERPL-MCNC: 150 MG/DL (ref 65–100)
GLUCOSE SERPL-MCNC: 155 MG/DL (ref 65–100)
GLUCOSE SERPL-MCNC: 155 MG/DL (ref 65–100)
GLUCOSE SERPL-MCNC: 156 MG/DL (ref 65–100)
GLUCOSE SERPL-MCNC: 168 MG/DL (ref 65–100)
GLUCOSE SERPL-MCNC: 169 MG/DL (ref 65–100)
GLUCOSE SERPL-MCNC: 174 MG/DL (ref 65–100)
GLUCOSE SERPL-MCNC: 174 MG/DL (ref 65–100)
GLUCOSE SERPL-MCNC: 175 MG/DL (ref 65–100)
GLUCOSE SERPL-MCNC: 177 MG/DL (ref 65–100)
GLUCOSE SERPL-MCNC: 187 MG/DL (ref 65–100)
GLUCOSE SERPL-MCNC: 190 MG/DL (ref 65–100)
GLUCOSE SERPL-MCNC: 191 MG/DL (ref 65–100)
GLUCOSE SERPL-MCNC: 192 MG/DL (ref 65–100)
GLUCOSE SERPL-MCNC: 194 MG/DL (ref 65–100)
GLUCOSE SERPL-MCNC: 202 MG/DL (ref 65–100)
GLUCOSE SERPL-MCNC: 206 MG/DL (ref 65–100)
GLUCOSE SERPL-MCNC: 207 MG/DL (ref 65–100)
GLUCOSE SERPL-MCNC: 207 MG/DL (ref 65–100)
GLUCOSE SERPL-MCNC: 209 MG/DL (ref 65–100)
GLUCOSE SERPL-MCNC: 212 MG/DL (ref 65–100)
GLUCOSE SERPL-MCNC: 213 MG/DL (ref 65–100)
GLUCOSE SERPL-MCNC: 219 MG/DL (ref 65–100)
GLUCOSE SERPL-MCNC: 223 MG/DL (ref 65–100)
GLUCOSE SERPL-MCNC: 224 MG/DL (ref 65–100)
GLUCOSE SERPL-MCNC: 227 MG/DL (ref 65–100)
GLUCOSE SERPL-MCNC: 229 MG/DL (ref 65–100)
GLUCOSE SERPL-MCNC: 231 MG/DL (ref 65–100)
GLUCOSE SERPL-MCNC: 231 MG/DL (ref 65–100)
GLUCOSE SERPL-MCNC: 232 MG/DL (ref 65–100)
GLUCOSE SERPL-MCNC: 233 MG/DL (ref 65–100)
GLUCOSE SERPL-MCNC: 238 MG/DL (ref 65–100)
GLUCOSE SERPL-MCNC: 240 MG/DL (ref 65–100)
GLUCOSE SERPL-MCNC: 240 MG/DL (ref 65–100)
GLUCOSE SERPL-MCNC: 242 MG/DL (ref 65–100)
GLUCOSE SERPL-MCNC: 245 MG/DL (ref 65–100)
GLUCOSE SERPL-MCNC: 245 MG/DL (ref 65–100)
GLUCOSE SERPL-MCNC: 247 MG/DL (ref 65–100)
GLUCOSE SERPL-MCNC: 260 MG/DL (ref 65–100)
GLUCOSE SERPL-MCNC: 262 MG/DL (ref 65–100)
GLUCOSE SERPL-MCNC: 262 MG/DL (ref 65–100)
GLUCOSE SERPL-MCNC: 263 MG/DL (ref 65–100)
GLUCOSE SERPL-MCNC: 270 MG/DL (ref 65–100)
GLUCOSE SERPL-MCNC: 272 MG/DL (ref 65–100)
GLUCOSE SERPL-MCNC: 275 MG/DL (ref 65–100)
GLUCOSE SERPL-MCNC: 279 MG/DL (ref 65–100)
GLUCOSE SERPL-MCNC: 279 MG/DL (ref 65–100)
GLUCOSE SERPL-MCNC: 280 MG/DL (ref 65–100)
GLUCOSE SERPL-MCNC: 283 MG/DL (ref 65–100)
GLUCOSE SERPL-MCNC: 284 MG/DL (ref 65–100)
GLUCOSE SERPL-MCNC: 288 MG/DL (ref 65–100)
GLUCOSE SERPL-MCNC: 289 MG/DL (ref 65–100)
GLUCOSE SERPL-MCNC: 289 MG/DL (ref 65–100)
GLUCOSE SERPL-MCNC: 291 MG/DL (ref 65–100)
GLUCOSE SERPL-MCNC: 299 MG/DL (ref 65–100)
GLUCOSE SERPL-MCNC: 301 MG/DL (ref 65–100)
GLUCOSE SERPL-MCNC: 306 MG/DL (ref 65–100)
GLUCOSE SERPL-MCNC: 313 MG/DL (ref 65–100)
GLUCOSE SERPL-MCNC: 332 MG/DL (ref 65–100)
GLUCOSE SERPL-MCNC: 334 MG/DL (ref 65–100)
GLUCOSE SERPL-MCNC: 342 MG/DL (ref 65–100)
GLUCOSE SERPL-MCNC: 353 MG/DL (ref 65–100)
GLUCOSE SERPL-MCNC: 359 MG/DL (ref 65–100)
GLUCOSE SERPL-MCNC: 362 MG/DL (ref 65–100)
GLUCOSE SERPL-MCNC: 369 MG/DL (ref 65–100)
GLUCOSE SERPL-MCNC: 416 MG/DL (ref 65–100)
GLUCOSE SERPL-MCNC: 67 MG/DL (ref 65–100)
GLUCOSE SERPL-MCNC: 68 MG/DL (ref 65–100)
GLUCOSE SERPL-MCNC: 69 MG/DL (ref 65–100)
GLUCOSE SERPL-MCNC: 86 MG/DL (ref 65–100)
GLUCOSE UR STRIP.AUTO-MCNC: 50 MG/DL
GLUCOSE UR STRIP.AUTO-MCNC: >300 MG/DL
GLUCOSE UR STRIP.AUTO-MCNC: >300 MG/DL
GLUCOSE UR STRIP.AUTO-MCNC: NEGATIVE MG/DL
GLUCOSE UR STRIP.AUTO-MCNC: NEGATIVE MG/DL
GRAM STN SPEC: NORMAL
GRAN CASTS URNS QL MICRO: ABNORMAL /LPF
HAV IGM SER QL: NONREACTIVE
HBA1C MFR BLD: 6.6 % (ref 4–5.6)
HBA1C MFR BLD: 7.2 % (ref 4–5.6)
HBA1C MFR BLD: 7.7 % (ref 4–5.6)
HBV CORE IGM SER QL: NONREACTIVE
HBV SURFACE AG SER QL: <0.1 INDEX
HBV SURFACE AG SER QL: NEGATIVE
HCO3 BLDA-SCNC: 17 MMOL/L (ref 22–26)
HCO3 BLDA-SCNC: 18 MMOL/L (ref 22–26)
HCO3 BLDA-SCNC: 19 MMOL/L (ref 22–26)
HCO3 BLDA-SCNC: 20 MMOL/L (ref 22–26)
HCO3 BLDA-SCNC: 21 MMOL/L (ref 22–26)
HCO3 BLDA-SCNC: 21 MMOL/L (ref 22–26)
HCO3 BLDA-SCNC: 22 MMOL/L (ref 22–26)
HCO3 BLDA-SCNC: 22 MMOL/L (ref 22–26)
HCO3 BLDA-SCNC: 23 MMOL/L (ref 22–26)
HCO3 BLDA-SCNC: 24 MMOL/L (ref 22–26)
HCO3 BLDA-SCNC: 25 MMOL/L (ref 22–26)
HCO3 BLDA-SCNC: 26 MMOL/L (ref 22–26)
HCO3 BLDV-SCNC: 26 MMOL/L (ref 22–26)
HCT VFR BLD AUTO: 19.7 % (ref 35–47)
HCT VFR BLD AUTO: 21.1 % (ref 35–47)
HCT VFR BLD AUTO: 21.3 % (ref 35–47)
HCT VFR BLD AUTO: 21.3 % (ref 35–47)
HCT VFR BLD AUTO: 21.7 % (ref 35–47)
HCT VFR BLD AUTO: 22 % (ref 35–47)
HCT VFR BLD AUTO: 22.4 % (ref 35–47)
HCT VFR BLD AUTO: 22.8 % (ref 35–47)
HCT VFR BLD AUTO: 22.9 % (ref 35–47)
HCT VFR BLD AUTO: 23.4 % (ref 35–47)
HCT VFR BLD AUTO: 23.6 % (ref 35–47)
HCT VFR BLD AUTO: 24 % (ref 35–47)
HCT VFR BLD AUTO: 24.1 % (ref 35–47)
HCT VFR BLD AUTO: 24.3 % (ref 35–47)
HCT VFR BLD AUTO: 24.4 % (ref 35–47)
HCT VFR BLD AUTO: 24.4 % (ref 35–47)
HCT VFR BLD AUTO: 24.5 % (ref 35–47)
HCT VFR BLD AUTO: 24.5 % (ref 35–47)
HCT VFR BLD AUTO: 24.6 % (ref 35–47)
HCT VFR BLD AUTO: 25 % (ref 35–47)
HCT VFR BLD AUTO: 25.1 % (ref 35–47)
HCT VFR BLD AUTO: 25.2 % (ref 35–47)
HCT VFR BLD AUTO: 25.2 % (ref 35–47)
HCT VFR BLD AUTO: 25.3 % (ref 35–47)
HCT VFR BLD AUTO: 25.3 % (ref 35–47)
HCT VFR BLD AUTO: 25.4 % (ref 35–47)
HCT VFR BLD AUTO: 25.4 % (ref 35–47)
HCT VFR BLD AUTO: 25.5 % (ref 35–47)
HCT VFR BLD AUTO: 25.7 % (ref 35–47)
HCT VFR BLD AUTO: 25.7 % (ref 35–47)
HCT VFR BLD AUTO: 25.9 % (ref 35–47)
HCT VFR BLD AUTO: 26 % (ref 35–47)
HCT VFR BLD AUTO: 26.3 % (ref 35–47)
HCT VFR BLD AUTO: 26.5 % (ref 35–47)
HCT VFR BLD AUTO: 26.5 % (ref 35–47)
HCT VFR BLD AUTO: 26.8 % (ref 35–47)
HCT VFR BLD AUTO: 26.9 % (ref 35–47)
HCT VFR BLD AUTO: 27.2 % (ref 35–47)
HCT VFR BLD AUTO: 27.4 % (ref 35–47)
HCT VFR BLD AUTO: 27.4 % (ref 35–47)
HCT VFR BLD AUTO: 27.6 % (ref 35–47)
HCT VFR BLD AUTO: 28 % (ref 35–47)
HCT VFR BLD AUTO: 28.1 % (ref 35–47)
HCT VFR BLD AUTO: 28.1 % (ref 35–47)
HCT VFR BLD AUTO: 28.2 % (ref 35–47)
HCT VFR BLD AUTO: 28.4 % (ref 35–47)
HCT VFR BLD AUTO: 28.6 % (ref 35–47)
HCT VFR BLD AUTO: 28.6 % (ref 35–47)
HCT VFR BLD AUTO: 28.7 % (ref 35–47)
HCT VFR BLD AUTO: 29.6 % (ref 35–47)
HCT VFR BLD AUTO: 29.7 % (ref 35–47)
HCT VFR BLD AUTO: 29.7 % (ref 35–47)
HCT VFR BLD AUTO: 29.8 % (ref 35–47)
HCT VFR BLD AUTO: 29.8 % (ref 35–47)
HCT VFR BLD AUTO: 30.2 % (ref 35–47)
HCT VFR BLD AUTO: 30.5 % (ref 35–47)
HCT VFR BLD AUTO: 30.6 % (ref 35–47)
HCT VFR BLD AUTO: 30.6 % (ref 35–47)
HCT VFR BLD AUTO: 30.9 % (ref 35–47)
HCT VFR BLD AUTO: 31.3 % (ref 35–47)
HCT VFR BLD AUTO: 31.4 % (ref 35–47)
HCT VFR BLD AUTO: 31.4 % (ref 35–47)
HCT VFR BLD AUTO: 31.6 % (ref 35–47)
HCT VFR BLD AUTO: 31.7 % (ref 35–47)
HCT VFR BLD AUTO: 31.9 % (ref 35–47)
HCT VFR BLD AUTO: 32.5 % (ref 35–47)
HCT VFR BLD AUTO: 32.6 % (ref 35–47)
HCT VFR BLD AUTO: 33.4 % (ref 35–47)
HCT VFR BLD AUTO: 33.9 % (ref 35–47)
HCT VFR BLD AUTO: 34.6 % (ref 35–47)
HCT VFR BLD AUTO: 35.5 % (ref 35–47)
HCT VFR BLD AUTO: 37.8 % (ref 35–47)
HCT VFR BLD AUTO: 37.9 % (ref 35–47)
HCT VFR BLD AUTO: 39.2 % (ref 35–47)
HCT VFR BLD AUTO: 40.4 % (ref 35–47)
HCV AB SER IA-ACNC: 6.93 INDEX
HCV AB SER IA-ACNC: >11 INDEX
HCV AB SER IA-ACNC: >11 INDEX
HCV AB SERPL QL IA: REACTIVE
HCV RNA SERPL QL NAA+PROBE: NEGATIVE
HDLC SERPL-MCNC: 30 MG/DL
HDLC SERPL: 4.1 {RATIO} (ref 0–5)
HGB BLD-MCNC: 10 G/DL (ref 11.5–16)
HGB BLD-MCNC: 10.1 G/DL (ref 11.5–16)
HGB BLD-MCNC: 10.1 G/DL (ref 11.5–16)
HGB BLD-MCNC: 10.2 G/DL (ref 11.5–16)
HGB BLD-MCNC: 10.3 G/DL (ref 11.5–16)
HGB BLD-MCNC: 10.5 G/DL (ref 11.5–16)
HGB BLD-MCNC: 10.6 G/DL (ref 11.5–16)
HGB BLD-MCNC: 11.2 G/DL (ref 11.5–16)
HGB BLD-MCNC: 11.7 G/DL (ref 11.5–16)
HGB BLD-MCNC: 12.1 G/DL (ref 11.5–16)
HGB BLD-MCNC: 12.1 G/DL (ref 11.5–16)
HGB BLD-MCNC: 6.3 G/DL (ref 11.5–16)
HGB BLD-MCNC: 6.7 G/DL (ref 11.5–16)
HGB BLD-MCNC: 6.8 G/DL (ref 11.5–16)
HGB BLD-MCNC: 6.9 G/DL (ref 11.5–16)
HGB BLD-MCNC: 7.1 G/DL (ref 11.5–16)
HGB BLD-MCNC: 7.3 G/DL (ref 11.5–16)
HGB BLD-MCNC: 7.3 G/DL (ref 11.5–16)
HGB BLD-MCNC: 7.4 G/DL (ref 11.5–16)
HGB BLD-MCNC: 7.4 G/DL (ref 11.5–16)
HGB BLD-MCNC: 7.5 G/DL (ref 11.5–16)
HGB BLD-MCNC: 7.6 G/DL (ref 11.5–16)
HGB BLD-MCNC: 7.6 G/DL (ref 11.5–16)
HGB BLD-MCNC: 7.7 G/DL (ref 11.5–16)
HGB BLD-MCNC: 7.7 G/DL (ref 11.5–16)
HGB BLD-MCNC: 7.8 G/DL (ref 11.5–16)
HGB BLD-MCNC: 7.9 G/DL (ref 11.5–16)
HGB BLD-MCNC: 8 G/DL (ref 11.5–16)
HGB BLD-MCNC: 8.1 G/DL (ref 11.5–16)
HGB BLD-MCNC: 8.3 G/DL (ref 11.5–16)
HGB BLD-MCNC: 8.3 G/DL (ref 11.5–16)
HGB BLD-MCNC: 8.4 G/DL (ref 11.5–16)
HGB BLD-MCNC: 8.5 G/DL (ref 11.5–16)
HGB BLD-MCNC: 8.7 G/DL (ref 11.5–16)
HGB BLD-MCNC: 8.7 G/DL (ref 11.5–16)
HGB BLD-MCNC: 8.9 G/DL (ref 11.5–16)
HGB BLD-MCNC: 8.9 G/DL (ref 11.5–16)
HGB BLD-MCNC: 9 G/DL (ref 11.5–16)
HGB BLD-MCNC: 9.1 G/DL (ref 11.5–16)
HGB BLD-MCNC: 9.3 G/DL (ref 11.5–16)
HGB BLD-MCNC: 9.4 G/DL (ref 11.5–16)
HGB BLD-MCNC: 9.5 G/DL (ref 11.5–16)
HGB BLD-MCNC: 9.5 G/DL (ref 11.5–16)
HGB BLD-MCNC: 9.6 G/DL (ref 11.5–16)
HGB BLD-MCNC: 9.6 G/DL (ref 11.5–16)
HGB BLD-MCNC: 9.7 G/DL (ref 11.5–16)
HGB BLD-MCNC: 9.8 G/DL (ref 11.5–16)
HGB UR QL STRIP: ABNORMAL
HGB UR QL STRIP: NEGATIVE
HYALINE CASTS URNS QL MICRO: ABNORMAL /LPF (ref 0–5)
IMM GRANULOCYTES # BLD AUTO: 0 K/UL
IMM GRANULOCYTES # BLD AUTO: 0 K/UL (ref 0–0.04)
IMM GRANULOCYTES # BLD AUTO: 0.1 K/UL (ref 0–0.04)
IMM GRANULOCYTES # BLD AUTO: 0.2 K/UL (ref 0–0.04)
IMM GRANULOCYTES # BLD AUTO: 0.3 K/UL (ref 0–0.04)
IMM GRANULOCYTES NFR BLD AUTO: 0 %
IMM GRANULOCYTES NFR BLD AUTO: 0 % (ref 0–0.5)
IMM GRANULOCYTES NFR BLD AUTO: 0 % (ref 0–0.5)
IMM GRANULOCYTES NFR BLD AUTO: 1 % (ref 0–0.5)
IMM GRANULOCYTES NFR BLD AUTO: 2 % (ref 0–0.5)
IMM GRANULOCYTES NFR BLD AUTO: 3 % (ref 0–0.5)
INR PPP: 1.2 (ref 0.9–1.1)
INR PPP: 1.3 (ref 0.9–1.1)
INR PPP: 1.4 (ref 0.9–1.1)
INR PPP: 1.5 (ref 0.9–1.1)
INR PPP: 1.6 (ref 0.9–1.1)
INR PPP: 1.7 (ref 0.9–1.1)
INR PPP: 1.8 (ref 0.9–1.1)
INR PPP: 1.9 (ref 0.9–1.1)
INR PPP: 2 (ref 0.9–1.1)
IPAP/PIP, IPAPIP: 0
IRON SATN MFR SERPL: 28 % (ref 20–50)
IRON SERPL-MCNC: 40 UG/DL (ref 35–150)
KETONES UR QL STRIP.AUTO: NEGATIVE MG/DL
LACTATE SERPL-SCNC: 1.2 MMOL/L (ref 0.4–2)
LACTATE SERPL-SCNC: 1.6 MMOL/L (ref 0.4–2)
LACTATE SERPL-SCNC: 1.7 MMOL/L (ref 0.4–2)
LACTATE SERPL-SCNC: 3 MMOL/L (ref 0.4–2)
LDLC SERPL CALC-MCNC: 44.8 MG/DL (ref 0–100)
LEFT ABI: 0.53
LEFT ARM BP: 168 MMHG
LEFT CFA DIST SYS PSV: 187 CM/S
LEFT DIST ATA VELOCITY: 66.1 CM/S
LEFT DIST PTA PSV: 43 CM/S
LEFT POP A PROX VEL RATIO: 1.91
LEFT POPLITEAL DIST SYS PSV: 131 CM/S
LEFT POPLITEAL PROX SYS PSV: 199 CM/S
LEFT PROX PFA A PSV: 164 CM/S
LEFT SFA DIST VEL RATIO: 1.06
LEFT SFA MID VEL RATIO: 0.59
LEFT SFA PROX VEL RATIO: 0.89
LEFT SUPER FEMORAL DIST SYS PSV: 104 CM/S
LEFT SUPER FEMORAL MID SYS PSV: 98 CM/S
LEFT SUPER FEMORAL PROX SYS PSV: 166 CM/S
LEUKOCYTE ESTERASE UR QL STRIP.AUTO: ABNORMAL
LIPID PROFILE,FLP: ABNORMAL
LYMPHOCYTES # BLD: 0.9 K/UL (ref 0.8–3.5)
LYMPHOCYTES # BLD: 1 K/UL (ref 0.8–3.5)
LYMPHOCYTES # BLD: 1.1 K/UL (ref 0.8–3.5)
LYMPHOCYTES # BLD: 1.3 K/UL (ref 0.8–3.5)
LYMPHOCYTES # BLD: 1.4 K/UL (ref 0.8–3.5)
LYMPHOCYTES # BLD: 1.5 K/UL (ref 0.8–3.5)
LYMPHOCYTES # BLD: 1.5 K/UL (ref 0.8–3.5)
LYMPHOCYTES # BLD: 1.6 K/UL (ref 0.8–3.5)
LYMPHOCYTES # BLD: 1.7 K/UL (ref 0.8–3.5)
LYMPHOCYTES # BLD: 1.8 K/UL (ref 0.8–3.5)
LYMPHOCYTES # BLD: 1.9 K/UL (ref 0.8–3.5)
LYMPHOCYTES # BLD: 2 K/UL (ref 0.8–3.5)
LYMPHOCYTES # BLD: 2.1 K/UL (ref 0.8–3.5)
LYMPHOCYTES # BLD: 2.2 K/UL (ref 0.8–3.5)
LYMPHOCYTES # BLD: 2.3 K/UL (ref 0.8–3.5)
LYMPHOCYTES # BLD: 2.4 K/UL (ref 0.8–3.5)
LYMPHOCYTES # BLD: 2.5 K/UL (ref 0.8–3.5)
LYMPHOCYTES # BLD: 2.6 K/UL (ref 0.8–3.5)
LYMPHOCYTES # BLD: 2.6 K/UL (ref 0.8–3.5)
LYMPHOCYTES # BLD: 2.7 K/UL (ref 0.8–3.5)
LYMPHOCYTES # BLD: 2.8 K/UL (ref 0.8–3.5)
LYMPHOCYTES # BLD: 3 K/UL (ref 0.8–3.5)
LYMPHOCYTES # BLD: 3.3 K/UL (ref 0.8–3.5)
LYMPHOCYTES NFR BLD: 10 % (ref 12–49)
LYMPHOCYTES NFR BLD: 11 % (ref 12–49)
LYMPHOCYTES NFR BLD: 12 % (ref 12–49)
LYMPHOCYTES NFR BLD: 13 % (ref 12–49)
LYMPHOCYTES NFR BLD: 14 % (ref 12–49)
LYMPHOCYTES NFR BLD: 15 % (ref 12–49)
LYMPHOCYTES NFR BLD: 16 % (ref 12–49)
LYMPHOCYTES NFR BLD: 17 % (ref 12–49)
LYMPHOCYTES NFR BLD: 18 % (ref 12–49)
LYMPHOCYTES NFR BLD: 19 % (ref 12–49)
LYMPHOCYTES NFR BLD: 20 % (ref 12–49)
LYMPHOCYTES NFR BLD: 21 % (ref 12–49)
LYMPHOCYTES NFR BLD: 23 % (ref 12–49)
LYMPHOCYTES NFR BLD: 25 % (ref 12–49)
LYMPHOCYTES NFR BLD: 25 % (ref 12–49)
LYMPHOCYTES NFR BLD: 5 % (ref 12–49)
LYMPHOCYTES NFR BLD: 6 % (ref 12–49)
LYMPHOCYTES NFR BLD: 7 % (ref 12–49)
LYMPHOCYTES NFR BLD: 8 % (ref 12–49)
LYMPHOCYTES NFR BLD: 8 % (ref 12–49)
LYMPHOCYTES NFR BLD: 9 % (ref 12–49)
MAGNESIUM SERPL-MCNC: 1.4 MG/DL (ref 1.6–2.4)
MAGNESIUM SERPL-MCNC: 1.6 MG/DL (ref 1.6–2.4)
MAGNESIUM SERPL-MCNC: 1.8 MG/DL (ref 1.6–2.4)
MAGNESIUM SERPL-MCNC: 1.8 MG/DL (ref 1.6–2.4)
MAGNESIUM SERPL-MCNC: 2.1 MG/DL (ref 1.6–2.4)
MAGNESIUM SERPL-MCNC: 2.1 MG/DL (ref 1.6–2.4)
MAGNESIUM SERPL-MCNC: 2.2 MG/DL (ref 1.6–2.4)
MAGNESIUM SERPL-MCNC: 2.3 MG/DL (ref 1.6–2.4)
MAGNESIUM SERPL-MCNC: 2.3 MG/DL (ref 1.6–2.4)
MAGNESIUM SERPL-MCNC: 2.4 MG/DL (ref 1.6–2.4)
MAGNESIUM SERPL-MCNC: 2.4 MG/DL (ref 1.6–2.4)
MAGNESIUM SERPL-MCNC: 2.5 MG/DL (ref 1.6–2.4)
MAGNESIUM SERPL-MCNC: 2.6 MG/DL (ref 1.6–2.4)
MAGNESIUM SERPL-MCNC: 2.9 MG/DL (ref 1.6–2.4)
MCH RBC QN AUTO: 26.9 PG (ref 26–34)
MCH RBC QN AUTO: 27 PG (ref 26–34)
MCH RBC QN AUTO: 27.2 PG (ref 26–34)
MCH RBC QN AUTO: 27.2 PG (ref 26–34)
MCH RBC QN AUTO: 27.3 PG (ref 26–34)
MCH RBC QN AUTO: 27.3 PG (ref 26–34)
MCH RBC QN AUTO: 27.5 PG (ref 26–34)
MCH RBC QN AUTO: 27.6 PG (ref 26–34)
MCH RBC QN AUTO: 27.7 PG (ref 26–34)
MCH RBC QN AUTO: 27.7 PG (ref 26–34)
MCH RBC QN AUTO: 27.8 PG (ref 26–34)
MCH RBC QN AUTO: 27.9 PG (ref 26–34)
MCH RBC QN AUTO: 28 PG (ref 26–34)
MCH RBC QN AUTO: 28.1 PG (ref 26–34)
MCH RBC QN AUTO: 28.2 PG (ref 26–34)
MCH RBC QN AUTO: 28.3 PG (ref 26–34)
MCH RBC QN AUTO: 28.4 PG (ref 26–34)
MCH RBC QN AUTO: 28.5 PG (ref 26–34)
MCH RBC QN AUTO: 28.6 PG (ref 26–34)
MCH RBC QN AUTO: 28.7 PG (ref 26–34)
MCH RBC QN AUTO: 28.7 PG (ref 26–34)
MCH RBC QN AUTO: 28.8 PG (ref 26–34)
MCH RBC QN AUTO: 28.8 PG (ref 26–34)
MCH RBC QN AUTO: 28.9 PG (ref 26–34)
MCH RBC QN AUTO: 29 PG (ref 26–34)
MCH RBC QN AUTO: 29.1 PG (ref 26–34)
MCH RBC QN AUTO: 29.2 PG (ref 26–34)
MCH RBC QN AUTO: 29.2 PG (ref 26–34)
MCH RBC QN AUTO: 29.3 PG (ref 26–34)
MCH RBC QN AUTO: 29.6 PG (ref 26–34)
MCHC RBC AUTO-ENTMCNC: 29.3 G/DL (ref 30–36.5)
MCHC RBC AUTO-ENTMCNC: 29.4 G/DL (ref 30–36.5)
MCHC RBC AUTO-ENTMCNC: 29.6 G/DL (ref 30–36.5)
MCHC RBC AUTO-ENTMCNC: 29.7 G/DL (ref 30–36.5)
MCHC RBC AUTO-ENTMCNC: 29.8 G/DL (ref 30–36.5)
MCHC RBC AUTO-ENTMCNC: 29.9 G/DL (ref 30–36.5)
MCHC RBC AUTO-ENTMCNC: 30 G/DL (ref 30–36.5)
MCHC RBC AUTO-ENTMCNC: 30 G/DL (ref 30–36.5)
MCHC RBC AUTO-ENTMCNC: 30.1 G/DL (ref 30–36.5)
MCHC RBC AUTO-ENTMCNC: 30.2 G/DL (ref 30–36.5)
MCHC RBC AUTO-ENTMCNC: 30.3 G/DL (ref 30–36.5)
MCHC RBC AUTO-ENTMCNC: 30.4 G/DL (ref 30–36.5)
MCHC RBC AUTO-ENTMCNC: 30.5 G/DL (ref 30–36.5)
MCHC RBC AUTO-ENTMCNC: 30.6 G/DL (ref 30–36.5)
MCHC RBC AUTO-ENTMCNC: 30.7 G/DL (ref 30–36.5)
MCHC RBC AUTO-ENTMCNC: 30.7 G/DL (ref 30–36.5)
MCHC RBC AUTO-ENTMCNC: 30.8 G/DL (ref 30–36.5)
MCHC RBC AUTO-ENTMCNC: 30.8 G/DL (ref 30–36.5)
MCHC RBC AUTO-ENTMCNC: 30.9 G/DL (ref 30–36.5)
MCHC RBC AUTO-ENTMCNC: 31 G/DL (ref 30–36.5)
MCHC RBC AUTO-ENTMCNC: 31.1 G/DL (ref 30–36.5)
MCHC RBC AUTO-ENTMCNC: 31.3 G/DL (ref 30–36.5)
MCHC RBC AUTO-ENTMCNC: 31.3 G/DL (ref 30–36.5)
MCHC RBC AUTO-ENTMCNC: 31.4 G/DL (ref 30–36.5)
MCHC RBC AUTO-ENTMCNC: 31.5 G/DL (ref 30–36.5)
MCHC RBC AUTO-ENTMCNC: 31.6 G/DL (ref 30–36.5)
MCHC RBC AUTO-ENTMCNC: 31.7 G/DL (ref 30–36.5)
MCHC RBC AUTO-ENTMCNC: 31.8 G/DL (ref 30–36.5)
MCHC RBC AUTO-ENTMCNC: 31.9 G/DL (ref 30–36.5)
MCHC RBC AUTO-ENTMCNC: 32 G/DL (ref 30–36.5)
MCHC RBC AUTO-ENTMCNC: 32.2 G/DL (ref 30–36.5)
MCHC RBC AUTO-ENTMCNC: 32.3 G/DL (ref 30–36.5)
MCHC RBC AUTO-ENTMCNC: 32.3 G/DL (ref 30–36.5)
MCHC RBC AUTO-ENTMCNC: 32.5 G/DL (ref 30–36.5)
MCHC RBC AUTO-ENTMCNC: 32.5 G/DL (ref 30–36.5)
MCHC RBC AUTO-ENTMCNC: 32.6 G/DL (ref 30–36.5)
MCHC RBC AUTO-ENTMCNC: 32.7 G/DL (ref 30–36.5)
MCHC RBC AUTO-ENTMCNC: 32.7 G/DL (ref 30–36.5)
MCHC RBC AUTO-ENTMCNC: 32.9 G/DL (ref 30–36.5)
MCHC RBC AUTO-ENTMCNC: 33 G/DL (ref 30–36.5)
MCHC RBC AUTO-ENTMCNC: 33.7 G/DL (ref 30–36.5)
MCHC RBC AUTO-ENTMCNC: 34 G/DL (ref 30–36.5)
MCV RBC AUTO: 85.8 FL (ref 80–99)
MCV RBC AUTO: 86.2 FL (ref 80–99)
MCV RBC AUTO: 86.5 FL (ref 80–99)
MCV RBC AUTO: 86.8 FL (ref 80–99)
MCV RBC AUTO: 87 FL (ref 80–99)
MCV RBC AUTO: 87.2 FL (ref 80–99)
MCV RBC AUTO: 87.5 FL (ref 80–99)
MCV RBC AUTO: 87.7 FL (ref 80–99)
MCV RBC AUTO: 87.8 FL (ref 80–99)
MCV RBC AUTO: 88 FL (ref 80–99)
MCV RBC AUTO: 88.1 FL (ref 80–99)
MCV RBC AUTO: 88.2 FL (ref 80–99)
MCV RBC AUTO: 88.2 FL (ref 80–99)
MCV RBC AUTO: 88.3 FL (ref 80–99)
MCV RBC AUTO: 88.3 FL (ref 80–99)
MCV RBC AUTO: 88.4 FL (ref 80–99)
MCV RBC AUTO: 88.5 FL (ref 80–99)
MCV RBC AUTO: 88.6 FL (ref 80–99)
MCV RBC AUTO: 88.6 FL (ref 80–99)
MCV RBC AUTO: 88.7 FL (ref 80–99)
MCV RBC AUTO: 88.9 FL (ref 80–99)
MCV RBC AUTO: 89 FL (ref 80–99)
MCV RBC AUTO: 89 FL (ref 80–99)
MCV RBC AUTO: 89.2 FL (ref 80–99)
MCV RBC AUTO: 89.3 FL (ref 80–99)
MCV RBC AUTO: 89.4 FL (ref 80–99)
MCV RBC AUTO: 89.7 FL (ref 80–99)
MCV RBC AUTO: 89.7 FL (ref 80–99)
MCV RBC AUTO: 89.8 FL (ref 80–99)
MCV RBC AUTO: 90.2 FL (ref 80–99)
MCV RBC AUTO: 90.2 FL (ref 80–99)
MCV RBC AUTO: 90.5 FL (ref 80–99)
MCV RBC AUTO: 90.5 FL (ref 80–99)
MCV RBC AUTO: 90.6 FL (ref 80–99)
MCV RBC AUTO: 90.8 FL (ref 80–99)
MCV RBC AUTO: 91 FL (ref 80–99)
MCV RBC AUTO: 91 FL (ref 80–99)
MCV RBC AUTO: 91.2 FL (ref 80–99)
MCV RBC AUTO: 91.2 FL (ref 80–99)
MCV RBC AUTO: 91.3 FL (ref 80–99)
MCV RBC AUTO: 91.6 FL (ref 80–99)
MCV RBC AUTO: 91.6 FL (ref 80–99)
MCV RBC AUTO: 91.7 FL (ref 80–99)
MCV RBC AUTO: 91.8 FL (ref 80–99)
MCV RBC AUTO: 91.8 FL (ref 80–99)
MCV RBC AUTO: 92 FL (ref 80–99)
MCV RBC AUTO: 92 FL (ref 80–99)
MCV RBC AUTO: 92.3 FL (ref 80–99)
MCV RBC AUTO: 92.5 FL (ref 80–99)
MCV RBC AUTO: 92.5 FL (ref 80–99)
MCV RBC AUTO: 92.7 FL (ref 80–99)
MCV RBC AUTO: 92.8 FL (ref 80–99)
MCV RBC AUTO: 93 FL (ref 80–99)
MCV RBC AUTO: 93 FL (ref 80–99)
MCV RBC AUTO: 93.1 FL (ref 80–99)
MCV RBC AUTO: 93.2 FL (ref 80–99)
MCV RBC AUTO: 93.4 FL (ref 80–99)
MCV RBC AUTO: 93.4 FL (ref 80–99)
MCV RBC AUTO: 93.5 FL (ref 80–99)
MCV RBC AUTO: 93.6 FL (ref 80–99)
MCV RBC AUTO: 93.8 FL (ref 80–99)
MCV RBC AUTO: 93.8 FL (ref 80–99)
MCV RBC AUTO: 93.9 FL (ref 80–99)
MCV RBC AUTO: 94 FL (ref 80–99)
MCV RBC AUTO: 94.5 FL (ref 80–99)
MCV RBC AUTO: 96.2 FL (ref 80–99)
MONOCYTES # BLD: 0 K/UL (ref 0–1)
MONOCYTES # BLD: 0.1 K/UL (ref 0–1)
MONOCYTES # BLD: 0.2 K/UL (ref 0–1)
MONOCYTES # BLD: 0.3 K/UL (ref 0–1)
MONOCYTES # BLD: 0.3 K/UL (ref 0–1)
MONOCYTES # BLD: 0.4 K/UL (ref 0–1)
MONOCYTES # BLD: 0.5 K/UL (ref 0–1)
MONOCYTES # BLD: 0.6 K/UL (ref 0–1)
MONOCYTES # BLD: 0.7 K/UL (ref 0–1)
MONOCYTES # BLD: 0.8 K/UL (ref 0–1)
MONOCYTES # BLD: 0.9 K/UL (ref 0–1)
MONOCYTES # BLD: 1 K/UL (ref 0–1)
MONOCYTES # BLD: 1.1 K/UL (ref 0–1)
MONOCYTES # BLD: 1.2 K/UL (ref 0–1)
MONOCYTES # BLD: 1.2 K/UL (ref 0–1)
MONOCYTES NFR BLD: 0 % (ref 5–13)
MONOCYTES NFR BLD: 1 % (ref 5–13)
MONOCYTES NFR BLD: 2 % (ref 5–13)
MONOCYTES NFR BLD: 3 % (ref 5–13)
MONOCYTES NFR BLD: 4 % (ref 5–13)
MONOCYTES NFR BLD: 5 % (ref 5–13)
MONOCYTES NFR BLD: 6 % (ref 5–13)
MONOCYTES NFR BLD: 7 % (ref 5–13)
MONOCYTES NFR BLD: 8 % (ref 5–13)
MONOCYTES NFR BLD: 8 % (ref 5–13)
MRSA DNA SPEC QL NAA+PROBE: NOT DETECTED
MUCOUS THREADS URNS QL MICRO: ABNORMAL /LPF
NEUTS BAND NFR BLD MANUAL: 2 % (ref 0–6)
NEUTS BAND NFR BLD MANUAL: 6 % (ref 0–6)
NEUTS SEG # BLD: 10 K/UL (ref 1.8–8)
NEUTS SEG # BLD: 10.1 K/UL (ref 1.8–8)
NEUTS SEG # BLD: 10.2 K/UL (ref 1.8–8)
NEUTS SEG # BLD: 10.4 K/UL (ref 1.8–8)
NEUTS SEG # BLD: 10.4 K/UL (ref 1.8–8)
NEUTS SEG # BLD: 10.5 K/UL (ref 1.8–8)
NEUTS SEG # BLD: 10.5 K/UL (ref 1.8–8)
NEUTS SEG # BLD: 10.7 K/UL (ref 1.8–8)
NEUTS SEG # BLD: 10.7 K/UL (ref 1.8–8)
NEUTS SEG # BLD: 10.9 K/UL (ref 1.8–8)
NEUTS SEG # BLD: 11 K/UL (ref 1.8–8)
NEUTS SEG # BLD: 11.2 K/UL (ref 1.8–8)
NEUTS SEG # BLD: 11.3 K/UL (ref 1.8–8)
NEUTS SEG # BLD: 11.3 K/UL (ref 1.8–8)
NEUTS SEG # BLD: 11.4 K/UL (ref 1.8–8)
NEUTS SEG # BLD: 11.4 K/UL (ref 1.8–8)
NEUTS SEG # BLD: 11.6 K/UL (ref 1.8–8)
NEUTS SEG # BLD: 11.7 K/UL (ref 1.8–8)
NEUTS SEG # BLD: 11.8 K/UL (ref 1.8–8)
NEUTS SEG # BLD: 11.8 K/UL (ref 1.8–8)
NEUTS SEG # BLD: 11.9 K/UL (ref 1.8–8)
NEUTS SEG # BLD: 12 K/UL (ref 1.8–8)
NEUTS SEG # BLD: 12.2 K/UL (ref 1.8–8)
NEUTS SEG # BLD: 12.2 K/UL (ref 1.8–8)
NEUTS SEG # BLD: 12.3 K/UL (ref 1.8–8)
NEUTS SEG # BLD: 12.4 K/UL (ref 1.8–8)
NEUTS SEG # BLD: 12.5 K/UL (ref 1.8–8)
NEUTS SEG # BLD: 13 K/UL (ref 1.8–8)
NEUTS SEG # BLD: 13.1 K/UL (ref 1.8–8)
NEUTS SEG # BLD: 13.2 K/UL (ref 1.8–8)
NEUTS SEG # BLD: 13.4 K/UL (ref 1.8–8)
NEUTS SEG # BLD: 13.5 K/UL (ref 1.8–8)
NEUTS SEG # BLD: 13.6 K/UL (ref 1.8–8)
NEUTS SEG # BLD: 13.6 K/UL (ref 1.8–8)
NEUTS SEG # BLD: 13.8 K/UL (ref 1.8–8)
NEUTS SEG # BLD: 14.2 K/UL (ref 1.8–8)
NEUTS SEG # BLD: 15.2 K/UL (ref 1.8–8)
NEUTS SEG # BLD: 15.6 K/UL (ref 1.8–8)
NEUTS SEG # BLD: 16.9 K/UL (ref 1.8–8)
NEUTS SEG # BLD: 17.1 K/UL (ref 1.8–8)
NEUTS SEG # BLD: 17.5 K/UL (ref 1.8–8)
NEUTS SEG # BLD: 20.2 K/UL (ref 1.8–8)
NEUTS SEG # BLD: 20.3 K/UL (ref 1.8–8)
NEUTS SEG # BLD: 20.5 K/UL (ref 1.8–8)
NEUTS SEG # BLD: 21.3 K/UL (ref 1.8–8)
NEUTS SEG # BLD: 23.1 K/UL (ref 1.8–8)
NEUTS SEG # BLD: 5.5 K/UL (ref 1.8–8)
NEUTS SEG # BLD: 5.6 K/UL (ref 1.8–8)
NEUTS SEG # BLD: 5.9 K/UL (ref 1.8–8)
NEUTS SEG # BLD: 8.2 K/UL (ref 1.8–8)
NEUTS SEG # BLD: 8.3 K/UL (ref 1.8–8)
NEUTS SEG # BLD: 8.4 K/UL (ref 1.8–8)
NEUTS SEG # BLD: 8.6 K/UL (ref 1.8–8)
NEUTS SEG # BLD: 8.9 K/UL (ref 1.8–8)
NEUTS SEG # BLD: 8.9 K/UL (ref 1.8–8)
NEUTS SEG # BLD: 9 K/UL (ref 1.8–8)
NEUTS SEG # BLD: 9.2 K/UL (ref 1.8–8)
NEUTS SEG # BLD: 9.2 K/UL (ref 1.8–8)
NEUTS SEG # BLD: 9.3 K/UL (ref 1.8–8)
NEUTS SEG # BLD: 9.3 K/UL (ref 1.8–8)
NEUTS SEG # BLD: 9.5 K/UL (ref 1.8–8)
NEUTS SEG # BLD: 9.6 K/UL (ref 1.8–8)
NEUTS SEG # BLD: 9.7 K/UL (ref 1.8–8)
NEUTS SEG # BLD: 9.8 K/UL (ref 1.8–8)
NEUTS SEG # BLD: 9.9 K/UL (ref 1.8–8)
NEUTS SEG # BLD: 9.9 K/UL (ref 1.8–8)
NEUTS SEG NFR BLD: 65 % (ref 32–75)
NEUTS SEG NFR BLD: 66 % (ref 32–75)
NEUTS SEG NFR BLD: 66 % (ref 32–75)
NEUTS SEG NFR BLD: 68 % (ref 32–75)
NEUTS SEG NFR BLD: 68 % (ref 32–75)
NEUTS SEG NFR BLD: 69 % (ref 32–75)
NEUTS SEG NFR BLD: 70 % (ref 32–75)
NEUTS SEG NFR BLD: 70 % (ref 32–75)
NEUTS SEG NFR BLD: 71 % (ref 32–75)
NEUTS SEG NFR BLD: 72 % (ref 32–75)
NEUTS SEG NFR BLD: 73 % (ref 32–75)
NEUTS SEG NFR BLD: 73 % (ref 32–75)
NEUTS SEG NFR BLD: 74 % (ref 32–75)
NEUTS SEG NFR BLD: 75 % (ref 32–75)
NEUTS SEG NFR BLD: 75 % (ref 32–75)
NEUTS SEG NFR BLD: 76 % (ref 32–75)
NEUTS SEG NFR BLD: 77 % (ref 32–75)
NEUTS SEG NFR BLD: 77 % (ref 32–75)
NEUTS SEG NFR BLD: 78 % (ref 32–75)
NEUTS SEG NFR BLD: 79 % (ref 32–75)
NEUTS SEG NFR BLD: 80 % (ref 32–75)
NEUTS SEG NFR BLD: 81 % (ref 32–75)
NEUTS SEG NFR BLD: 81 % (ref 32–75)
NEUTS SEG NFR BLD: 82 % (ref 32–75)
NEUTS SEG NFR BLD: 83 % (ref 32–75)
NEUTS SEG NFR BLD: 84 % (ref 32–75)
NEUTS SEG NFR BLD: 85 % (ref 32–75)
NEUTS SEG NFR BLD: 87 % (ref 32–75)
NEUTS SEG NFR BLD: 88 % (ref 32–75)
NEUTS SEG NFR BLD: 89 % (ref 32–75)
NEUTS SEG NFR BLD: 90 % (ref 32–75)
NEUTS SEG NFR BLD: 91 % (ref 32–75)
NEUTS SEG NFR BLD: 91 % (ref 32–75)
NITRITE UR QL STRIP.AUTO: NEGATIVE
NRBC # BLD: 0 K/UL (ref 0–0.01)
NRBC # BLD: 0.02 K/UL (ref 0–0.01)
NRBC # BLD: 0.03 K/UL (ref 0–0.01)
NRBC # BLD: 0.04 K/UL (ref 0–0.01)
NRBC # BLD: 0.05 K/UL (ref 0–0.01)
NRBC # BLD: 0.07 K/UL (ref 0–0.01)
NRBC # BLD: 0.08 K/UL (ref 0–0.01)
NRBC # BLD: 0.1 K/UL (ref 0–0.01)
NRBC # BLD: 0.1 K/UL (ref 0–0.01)
NRBC # BLD: 0.11 K/UL (ref 0–0.01)
NRBC # BLD: 0.13 K/UL (ref 0–0.01)
NRBC # BLD: 0.13 K/UL (ref 0–0.01)
NRBC # BLD: 0.22 K/UL (ref 0–0.01)
NRBC # BLD: 0.23 K/UL (ref 0–0.01)
NRBC # BLD: 0.36 K/UL (ref 0–0.01)
NRBC # BLD: 0.4 K/UL (ref 0–0.01)
NRBC # BLD: 0.44 K/UL (ref 0–0.01)
NRBC # BLD: 0.59 K/UL (ref 0–0.01)
NRBC # BLD: 0.69 K/UL (ref 0–0.01)
NRBC # BLD: 0.77 K/UL (ref 0–0.01)
NRBC # BLD: 0.87 K/UL (ref 0–0.01)
NRBC # BLD: 0.97 K/UL
NRBC BLD MANUAL-RTO: 7 PER 100 WBC
NRBC BLD-RTO: 0 PER 100 WBC
NRBC BLD-RTO: 0.1 PER 100 WBC
NRBC BLD-RTO: 0.2 PER 100 WBC
NRBC BLD-RTO: 0.3 PER 100 WBC
NRBC BLD-RTO: 0.4 PER 100 WBC
NRBC BLD-RTO: 0.5 PER 100 WBC
NRBC BLD-RTO: 0.6 PER 100 WBC
NRBC BLD-RTO: 0.6 PER 100 WBC
NRBC BLD-RTO: 0.8 PER 100 WBC
NRBC BLD-RTO: 0.9 PER 100 WBC
NRBC BLD-RTO: 1 PER 100 WBC
NRBC BLD-RTO: 1.3 PER 100 WBC
NRBC BLD-RTO: 1.4 PER 100 WBC
NRBC BLD-RTO: 1.6 PER 100 WBC
NRBC BLD-RTO: 2 PER 100 WBC
NRBC BLD-RTO: 2.6 PER 100 WBC
NRBC BLD-RTO: 4 PER 100 WBC
NRBC BLD-RTO: 5.1 PER 100 WBC
NRBC BLD-RTO: 5.3 PER 100 WBC
NRBC BLD-RTO: 6 PER 100 WBC
OTHER URINE MICRO,5051: 7
OTHER URINE MICRO,5051: PRESENT
P-R INTERVAL, ECG05: 132 MS
P-R INTERVAL, ECG05: 140 MS
P-R INTERVAL, ECG05: 140 MS
P-R INTERVAL, ECG05: 144 MS
P-R INTERVAL, ECG05: 148 MS
P-R INTERVAL, ECG05: 154 MS
P-R INTERVAL, ECG05: 160 MS
P-R INTERVAL, ECG05: 162 MS
PCO2 BLDA: 25 MMHG (ref 35–45)
PCO2 BLDA: 26 MMHG (ref 35–45)
PCO2 BLDA: 28 MMHG (ref 35–45)
PCO2 BLDA: 29 MMHG (ref 35–45)
PCO2 BLDA: 30 MMHG (ref 35–45)
PCO2 BLDA: 30 MMHG (ref 35–45)
PCO2 BLDA: 31 MMHG (ref 35–45)
PCO2 BLDA: 32 MMHG (ref 35–45)
PCO2 BLDA: 32 MMHG (ref 35–45)
PCO2 BLDA: 33 MMHG (ref 35–45)
PCO2 BLDA: 35 MMHG (ref 35–45)
PCO2 BLDA: 36 MMHG (ref 35–45)
PCO2 BLDA: 37 MMHG (ref 35–45)
PCO2 BLDA: 39 MMHG (ref 35–45)
PCO2 BLDA: 40 MMHG (ref 35–45)
PCO2 BLDV: 39.5 MMHG (ref 40–50)
PEEP MAX SETTING VENT: 16 CM[H2O]
PEEP MAX SETTING VENT: 16 CM[H2O]
PERFORMED BY, TECHID: ABNORMAL
PERFORMED BY, TECHID: NORMAL
PH BLDA: 7.33 [PH] (ref 7.35–7.45)
PH BLDA: 7.34 [PH] (ref 7.35–7.45)
PH BLDA: 7.35 [PH] (ref 7.35–7.45)
PH BLDA: 7.36 [PH] (ref 7.35–7.45)
PH BLDA: 7.37 [PH] (ref 7.35–7.45)
PH BLDA: 7.39 [PH] (ref 7.35–7.45)
PH BLDA: 7.4 [PH] (ref 7.35–7.45)
PH BLDA: 7.4 [PH] (ref 7.35–7.45)
PH BLDA: 7.41 [PH] (ref 7.35–7.45)
PH BLDA: 7.43 [PH] (ref 7.35–7.45)
PH BLDA: 7.44 [PH] (ref 7.35–7.45)
PH BLDA: 7.45 [PH] (ref 7.35–7.45)
PH BLDA: 7.45 [PH] (ref 7.35–7.45)
PH BLDA: 7.51 [PH] (ref 7.35–7.45)
PH BLDA: 7.51 [PH] (ref 7.35–7.45)
PH BLDA: 7.52 [PH] (ref 7.35–7.45)
PH BLDV: 7.42 [PH] (ref 7.31–7.41)
PH UR STRIP: 5 [PH] (ref 5–8)
PH UR STRIP: 5 [PH] (ref 5–8)
PH UR STRIP: 6 [PH] (ref 5–8)
PH UR STRIP: 6 [PH] (ref 5–8)
PH UR STRIP: 7 [PH] (ref 5–8)
PHOSPHATE SERPL-MCNC: 2.2 MG/DL (ref 2.6–4.7)
PHOSPHATE SERPL-MCNC: 2.2 MG/DL (ref 2.6–4.7)
PHOSPHATE SERPL-MCNC: 2.4 MG/DL (ref 2.6–4.7)
PHOSPHATE SERPL-MCNC: 2.5 MG/DL (ref 2.6–4.7)
PHOSPHATE SERPL-MCNC: 2.6 MG/DL (ref 2.6–4.7)
PHOSPHATE SERPL-MCNC: 2.6 MG/DL (ref 2.6–4.7)
PHOSPHATE SERPL-MCNC: 2.7 MG/DL (ref 2.6–4.7)
PHOSPHATE SERPL-MCNC: 2.8 MG/DL (ref 2.6–4.7)
PHOSPHATE SERPL-MCNC: 2.8 MG/DL (ref 2.6–4.7)
PHOSPHATE SERPL-MCNC: 3 MG/DL (ref 2.6–4.7)
PHOSPHATE SERPL-MCNC: 3.1 MG/DL (ref 2.6–4.7)
PHOSPHATE SERPL-MCNC: 3.1 MG/DL (ref 2.6–4.7)
PHOSPHATE SERPL-MCNC: 3.2 MG/DL (ref 2.6–4.7)
PHOSPHATE SERPL-MCNC: 3.2 MG/DL (ref 2.6–4.7)
PHOSPHATE SERPL-MCNC: 3.3 MG/DL (ref 2.6–4.7)
PHOSPHATE SERPL-MCNC: 3.4 MG/DL (ref 2.6–4.7)
PHOSPHATE SERPL-MCNC: 3.4 MG/DL (ref 2.6–4.7)
PHOSPHATE SERPL-MCNC: 3.8 MG/DL (ref 2.6–4.7)
PHOSPHATE SERPL-MCNC: 3.9 MG/DL (ref 2.6–4.7)
PHOSPHATE SERPL-MCNC: 3.9 MG/DL (ref 2.6–4.7)
PHOSPHATE SERPL-MCNC: 5 MG/DL (ref 2.6–4.7)
PHOSPHATE SERPL-MCNC: 5.3 MG/DL (ref 2.6–4.7)
PHOSPHATE SERPL-MCNC: 5.3 MG/DL (ref 2.6–4.7)
PHOSPHATE SERPL-MCNC: 6.1 MG/DL (ref 2.6–4.7)
PLATELET # BLD AUTO: 190 K/UL (ref 150–400)
PLATELET # BLD AUTO: 192 K/UL (ref 150–400)
PLATELET # BLD AUTO: 202 K/UL (ref 150–400)
PLATELET # BLD AUTO: 205 K/UL (ref 150–400)
PLATELET # BLD AUTO: 226 K/UL (ref 150–400)
PLATELET # BLD AUTO: 227 K/UL (ref 150–400)
PLATELET # BLD AUTO: 232 K/UL (ref 150–400)
PLATELET # BLD AUTO: 245 K/UL (ref 150–400)
PLATELET # BLD AUTO: 247 K/UL (ref 150–400)
PLATELET # BLD AUTO: 251 K/UL (ref 150–400)
PLATELET # BLD AUTO: 255 K/UL (ref 150–400)
PLATELET # BLD AUTO: 256 K/UL (ref 150–400)
PLATELET # BLD AUTO: 263 K/UL (ref 150–400)
PLATELET # BLD AUTO: 263 K/UL (ref 150–400)
PLATELET # BLD AUTO: 278 K/UL (ref 150–400)
PLATELET # BLD AUTO: 284 K/UL (ref 150–400)
PLATELET # BLD AUTO: 286 K/UL (ref 150–400)
PLATELET # BLD AUTO: 287 K/UL (ref 150–400)
PLATELET # BLD AUTO: 289 K/UL (ref 150–400)
PLATELET # BLD AUTO: 293 K/UL (ref 150–400)
PLATELET # BLD AUTO: 294 K/UL (ref 150–400)
PLATELET # BLD AUTO: 300 K/UL (ref 150–400)
PLATELET # BLD AUTO: 300 K/UL (ref 150–400)
PLATELET # BLD AUTO: 302 K/UL (ref 150–400)
PLATELET # BLD AUTO: 303 K/UL (ref 150–400)
PLATELET # BLD AUTO: 308 K/UL (ref 150–400)
PLATELET # BLD AUTO: 313 K/UL (ref 150–400)
PLATELET # BLD AUTO: 314 K/UL (ref 150–400)
PLATELET # BLD AUTO: 317 K/UL (ref 150–400)
PLATELET # BLD AUTO: 321 K/UL (ref 150–400)
PLATELET # BLD AUTO: 321 K/UL (ref 150–400)
PLATELET # BLD AUTO: 323 K/UL (ref 150–400)
PLATELET # BLD AUTO: 326 K/UL (ref 150–400)
PLATELET # BLD AUTO: 327 K/UL (ref 150–400)
PLATELET # BLD AUTO: 329 K/UL (ref 150–400)
PLATELET # BLD AUTO: 332 K/UL (ref 150–400)
PLATELET # BLD AUTO: 334 K/UL (ref 150–400)
PLATELET # BLD AUTO: 334 K/UL (ref 150–400)
PLATELET # BLD AUTO: 335 K/UL (ref 150–400)
PLATELET # BLD AUTO: 335 K/UL (ref 150–400)
PLATELET # BLD AUTO: 336 K/UL (ref 150–400)
PLATELET # BLD AUTO: 338 K/UL (ref 150–400)
PLATELET # BLD AUTO: 339 K/UL (ref 150–400)
PLATELET # BLD AUTO: 348 K/UL (ref 150–400)
PLATELET # BLD AUTO: 349 K/UL (ref 150–400)
PLATELET # BLD AUTO: 350 K/UL (ref 150–400)
PLATELET # BLD AUTO: 357 K/UL (ref 150–400)
PLATELET # BLD AUTO: 357 K/UL (ref 150–400)
PLATELET # BLD AUTO: 360 K/UL (ref 150–400)
PLATELET # BLD AUTO: 361 K/UL (ref 150–400)
PLATELET # BLD AUTO: 363 K/UL (ref 150–400)
PLATELET # BLD AUTO: 366 K/UL (ref 150–400)
PLATELET # BLD AUTO: 368 K/UL (ref 150–400)
PLATELET # BLD AUTO: 368 K/UL (ref 150–400)
PLATELET # BLD AUTO: 372 K/UL (ref 150–400)
PLATELET # BLD AUTO: 374 K/UL (ref 150–400)
PLATELET # BLD AUTO: 375 K/UL (ref 150–400)
PLATELET # BLD AUTO: 379 K/UL (ref 150–400)
PLATELET # BLD AUTO: 381 K/UL (ref 150–400)
PLATELET # BLD AUTO: 383 K/UL (ref 150–400)
PLATELET # BLD AUTO: 383 K/UL (ref 150–400)
PLATELET # BLD AUTO: 386 K/UL (ref 150–400)
PLATELET # BLD AUTO: 388 K/UL (ref 150–400)
PLATELET # BLD AUTO: 389 K/UL (ref 150–400)
PLATELET # BLD AUTO: 398 K/UL (ref 150–400)
PLATELET # BLD AUTO: 405 K/UL (ref 150–400)
PLATELET # BLD AUTO: 408 K/UL (ref 150–400)
PLATELET # BLD AUTO: 415 K/UL (ref 150–400)
PLATELET # BLD AUTO: 419 K/UL (ref 150–400)
PLATELET # BLD AUTO: 430 K/UL (ref 150–400)
PLATELET # BLD AUTO: 448 K/UL (ref 150–400)
PLATELET # BLD AUTO: 471 K/UL (ref 150–400)
PLATELET # BLD AUTO: 487 K/UL (ref 150–400)
PLATELET # BLD AUTO: 492 K/UL (ref 150–400)
PLATELET # BLD AUTO: 501 K/UL (ref 150–400)
PLATELET COMMENTS,PCOM: ABNORMAL
PLATELET COMMENTS,PCOM: ABNORMAL
PMV BLD AUTO: 10.4 FL (ref 8.9–12.9)
PMV BLD AUTO: 10.6 FL (ref 8.9–12.9)
PMV BLD AUTO: 10.6 FL (ref 8.9–12.9)
PMV BLD AUTO: 10.7 FL (ref 8.9–12.9)
PMV BLD AUTO: 10.7 FL (ref 8.9–12.9)
PMV BLD AUTO: 10.8 FL (ref 8.9–12.9)
PMV BLD AUTO: 10.9 FL (ref 8.9–12.9)
PMV BLD AUTO: 11 FL (ref 8.9–12.9)
PMV BLD AUTO: 11.1 FL (ref 8.9–12.9)
PMV BLD AUTO: 11.2 FL (ref 8.9–12.9)
PMV BLD AUTO: 11.3 FL (ref 8.9–12.9)
PMV BLD AUTO: 11.4 FL (ref 8.9–12.9)
PMV BLD AUTO: 11.5 FL (ref 8.9–12.9)
PMV BLD AUTO: 11.6 FL (ref 8.9–12.9)
PMV BLD AUTO: 11.6 FL (ref 8.9–12.9)
PMV BLD AUTO: 11.7 FL (ref 8.9–12.9)
PMV BLD AUTO: 11.8 FL (ref 8.9–12.9)
PMV BLD AUTO: 11.9 FL (ref 8.9–12.9)
PMV BLD AUTO: 12 FL (ref 8.9–12.9)
PMV BLD AUTO: 12.2 FL (ref 8.9–12.9)
PMV BLD AUTO: 12.2 FL (ref 8.9–12.9)
PMV BLD AUTO: 12.3 FL (ref 8.9–12.9)
PMV BLD AUTO: 12.5 FL (ref 8.9–12.9)
PMV BLD AUTO: 12.7 FL (ref 8.9–12.9)
PMV BLD AUTO: 12.8 FL (ref 8.9–12.9)
PMV BLD AUTO: 12.9 FL (ref 8.9–12.9)
PO2 BLDA: 100 MMHG (ref 75–100)
PO2 BLDA: 101 MMHG (ref 75–100)
PO2 BLDA: 102 MMHG (ref 75–100)
PO2 BLDA: 118 MMHG (ref 75–100)
PO2 BLDA: 121 MMHG (ref 75–100)
PO2 BLDA: 121 MMHG (ref 75–100)
PO2 BLDA: 123 MMHG (ref 75–100)
PO2 BLDA: 124 MMHG (ref 75–100)
PO2 BLDA: 154 MMHG (ref 75–100)
PO2 BLDA: 54 MMHG (ref 75–100)
PO2 BLDA: 60 MMHG (ref 75–100)
PO2 BLDA: 65 MMHG (ref 75–100)
PO2 BLDA: 68 MMHG (ref 75–100)
PO2 BLDA: 75 MMHG (ref 75–100)
PO2 BLDA: 79 MMHG (ref 75–100)
PO2 BLDA: 81 MMHG (ref 75–100)
PO2 BLDA: 83 MMHG (ref 75–100)
PO2 BLDA: 84 MMHG (ref 75–100)
PO2 BLDA: 85 MMHG (ref 75–100)
PO2 BLDA: 85 MMHG (ref 75–100)
PO2 BLDA: 86 MMHG (ref 75–100)
PO2 BLDA: 88 MMHG (ref 75–100)
PO2 BLDA: 91 MMHG (ref 75–100)
PO2 BLDA: 91 MMHG (ref 75–100)
PO2 BLDA: 93 MMHG (ref 75–100)
PO2 BLDA: 94 MMHG (ref 75–100)
PO2 BLDA: 99 MMHG (ref 75–100)
PO2 BLDV: 163 MMHG (ref 36–42)
POTASSIUM SERPL-SCNC: 3.2 MMOL/L (ref 3.5–5.1)
POTASSIUM SERPL-SCNC: 3.3 MMOL/L (ref 3.5–5.1)
POTASSIUM SERPL-SCNC: 3.4 MMOL/L (ref 3.5–5.1)
POTASSIUM SERPL-SCNC: 3.5 MMOL/L (ref 3.5–5.1)
POTASSIUM SERPL-SCNC: 3.6 MMOL/L (ref 3.5–5.1)
POTASSIUM SERPL-SCNC: 3.6 MMOL/L (ref 3.5–5.1)
POTASSIUM SERPL-SCNC: 3.7 MMOL/L (ref 3.5–5.1)
POTASSIUM SERPL-SCNC: 3.8 MMOL/L (ref 3.5–5.1)
POTASSIUM SERPL-SCNC: 3.9 MMOL/L (ref 3.5–5.1)
POTASSIUM SERPL-SCNC: 4 MMOL/L (ref 3.5–5.1)
POTASSIUM SERPL-SCNC: 4.1 MMOL/L (ref 3.5–5.1)
POTASSIUM SERPL-SCNC: 4.2 MMOL/L (ref 3.5–5.1)
POTASSIUM SERPL-SCNC: 4.3 MMOL/L (ref 3.5–5.1)
POTASSIUM SERPL-SCNC: 4.3 MMOL/L (ref 3.5–5.1)
POTASSIUM SERPL-SCNC: 4.4 MMOL/L (ref 3.5–5.1)
POTASSIUM SERPL-SCNC: 4.5 MMOL/L (ref 3.5–5.1)
POTASSIUM SERPL-SCNC: 4.6 MMOL/L (ref 3.5–5.1)
POTASSIUM SERPL-SCNC: 4.7 MMOL/L (ref 3.5–5.1)
POTASSIUM SERPL-SCNC: 4.8 MMOL/L (ref 3.5–5.1)
POTASSIUM SERPL-SCNC: 4.9 MMOL/L (ref 3.5–5.1)
POTASSIUM SERPL-SCNC: 4.9 MMOL/L (ref 3.5–5.1)
POTASSIUM SERPL-SCNC: 5 MMOL/L (ref 3.5–5.1)
POTASSIUM SERPL-SCNC: 5.1 MMOL/L (ref 3.5–5.1)
POTASSIUM SERPL-SCNC: 5.2 MMOL/L (ref 3.5–5.1)
POTASSIUM SERPL-SCNC: 5.3 MMOL/L (ref 3.5–5.1)
POTASSIUM SERPL-SCNC: 5.6 MMOL/L (ref 3.5–5.1)
POTASSIUM SERPL-SCNC: 5.8 MMOL/L (ref 3.5–5.1)
POTASSIUM SERPL-SCNC: 6.2 MMOL/L (ref 3.5–5.1)
POTASSIUM SERPL-SCNC: ABNORMAL MMOL/L (ref 3.5–5.1)
PROCALCITONIN SERPL-MCNC: 0.24 NG/ML
PROCALCITONIN SERPL-MCNC: 0.26 NG/ML
PROCALCITONIN SERPL-MCNC: 0.26 NG/ML
PROCALCITONIN SERPL-MCNC: 0.31 NG/ML
PROCALCITONIN SERPL-MCNC: 0.36 NG/ML
PROCALCITONIN SERPL-MCNC: 0.39 NG/ML
PROCALCITONIN SERPL-MCNC: 0.43 NG/ML
PROCALCITONIN SERPL-MCNC: 0.45 NG/ML
PROCALCITONIN SERPL-MCNC: 0.54 NG/ML
PROCALCITONIN SERPL-MCNC: 0.56 NG/ML
PROCALCITONIN SERPL-MCNC: 0.57 NG/ML
PROCALCITONIN SERPL-MCNC: 0.59 NG/ML
PROCALCITONIN SERPL-MCNC: 0.62 NG/ML
PROCALCITONIN SERPL-MCNC: 0.64 NG/ML
PROCALCITONIN SERPL-MCNC: 0.67 NG/ML
PROCALCITONIN SERPL-MCNC: 0.67 NG/ML
PROCALCITONIN SERPL-MCNC: 0.71 NG/ML
PROCALCITONIN SERPL-MCNC: 0.71 NG/ML
PROCALCITONIN SERPL-MCNC: 0.77 NG/ML
PROCALCITONIN SERPL-MCNC: 0.8 NG/ML
PROCALCITONIN SERPL-MCNC: 0.84 NG/ML
PROCALCITONIN SERPL-MCNC: 1.02 NG/ML
PROCALCITONIN SERPL-MCNC: 1.2 NG/ML
PROCALCITONIN SERPL-MCNC: 1.34 NG/ML
PROCALCITONIN SERPL-MCNC: 1.47 NG/ML
PROCALCITONIN SERPL-MCNC: 1.55 NG/ML
PROCALCITONIN SERPL-MCNC: 1.64 NG/ML
PROCALCITONIN SERPL-MCNC: 1.66 NG/ML
PROCALCITONIN SERPL-MCNC: 1.72 NG/ML
PROCALCITONIN SERPL-MCNC: 1.93 NG/ML
PROCALCITONIN SERPL-MCNC: 11.83 NG/ML
PROCALCITONIN SERPL-MCNC: 2.01 NG/ML
PROCALCITONIN SERPL-MCNC: 2.28 NG/ML
PROCALCITONIN SERPL-MCNC: 2.28 NG/ML
PROCALCITONIN SERPL-MCNC: 2.32 NG/ML
PROCALCITONIN SERPL-MCNC: 2.42 NG/ML
PROCALCITONIN SERPL-MCNC: 2.91 NG/ML
PROCALCITONIN SERPL-MCNC: 3.23 NG/ML
PROCALCITONIN SERPL-MCNC: 3.29 NG/ML
PROCALCITONIN SERPL-MCNC: 3.37 NG/ML
PROCALCITONIN SERPL-MCNC: 3.78 NG/ML
PROCALCITONIN SERPL-MCNC: 4.78 NG/ML
PROCALCITONIN SERPL-MCNC: 4.99 NG/ML
PROCALCITONIN SERPL-MCNC: 5.34 NG/ML
PROCALCITONIN SERPL-MCNC: 6.93 NG/ML
PROT SERPL-MCNC: 5.2 G/DL (ref 6.4–8.2)
PROT SERPL-MCNC: 5.6 G/DL (ref 6.4–8.2)
PROT SERPL-MCNC: 5.7 G/DL (ref 6.4–8.2)
PROT SERPL-MCNC: 5.8 G/DL (ref 6.4–8.2)
PROT SERPL-MCNC: 5.9 G/DL (ref 6.4–8.2)
PROT SERPL-MCNC: 5.9 G/DL (ref 6.4–8.2)
PROT SERPL-MCNC: 6 G/DL (ref 6.4–8.2)
PROT SERPL-MCNC: 6.1 G/DL (ref 6.4–8.2)
PROT SERPL-MCNC: 6.2 G/DL (ref 6.4–8.2)
PROT SERPL-MCNC: 6.2 G/DL (ref 6.4–8.2)
PROT SERPL-MCNC: 6.3 G/DL (ref 6.4–8.2)
PROT SERPL-MCNC: 6.4 G/DL (ref 6.4–8.2)
PROT SERPL-MCNC: 6.5 G/DL (ref 6.4–8.2)
PROT SERPL-MCNC: 6.6 G/DL (ref 6.4–8.2)
PROT SERPL-MCNC: 6.7 G/DL (ref 6.4–8.2)
PROT SERPL-MCNC: 6.8 G/DL (ref 6.4–8.2)
PROT SERPL-MCNC: 6.9 G/DL (ref 6.4–8.2)
PROT SERPL-MCNC: 7 G/DL (ref 6.4–8.2)
PROT SERPL-MCNC: 7.1 G/DL (ref 6.4–8.2)
PROT SERPL-MCNC: 7.2 G/DL (ref 6.4–8.2)
PROT SERPL-MCNC: 7.3 G/DL (ref 6.4–8.2)
PROT SERPL-MCNC: 7.3 G/DL (ref 6.4–8.2)
PROT SERPL-MCNC: 7.4 G/DL (ref 6.4–8.2)
PROT SERPL-MCNC: 7.4 G/DL (ref 6.4–8.2)
PROT SERPL-MCNC: 7.5 G/DL (ref 6.4–8.2)
PROT SERPL-MCNC: 7.6 G/DL (ref 6.4–8.2)
PROT SERPL-MCNC: 7.7 G/DL (ref 6.4–8.2)
PROT SERPL-MCNC: 7.7 G/DL (ref 6.4–8.2)
PROT SERPL-MCNC: 7.8 G/DL (ref 6.4–8.2)
PROT SERPL-MCNC: 7.8 G/DL (ref 6.4–8.2)
PROT UR STRIP-MCNC: 100 MG/DL
PROT UR STRIP-MCNC: 30 MG/DL
PROT UR STRIP-MCNC: 30 MG/DL
PROT UR STRIP-MCNC: NEGATIVE MG/DL
PROT UR STRIP-MCNC: NEGATIVE MG/DL
PROTHROMBIN TIME: 15.3 SEC (ref 11.9–14.6)
PROTHROMBIN TIME: 15.9 SEC (ref 11.9–14.6)
PROTHROMBIN TIME: 16.1 SEC (ref 11.9–14.6)
PROTHROMBIN TIME: 16.2 SEC (ref 11.9–14.6)
PROTHROMBIN TIME: 16.3 SEC (ref 11.9–14.6)
PROTHROMBIN TIME: 16.4 SEC (ref 11.9–14.6)
PROTHROMBIN TIME: 16.6 SEC (ref 11.9–14.6)
PROTHROMBIN TIME: 16.7 SEC (ref 11.9–14.6)
PROTHROMBIN TIME: 16.8 SEC (ref 11.9–14.6)
PROTHROMBIN TIME: 16.9 SEC (ref 11.9–14.6)
PROTHROMBIN TIME: 17 SEC (ref 11.9–14.6)
PROTHROMBIN TIME: 17.3 SEC (ref 11.9–14.6)
PROTHROMBIN TIME: 17.4 SEC (ref 11.9–14.6)
PROTHROMBIN TIME: 17.5 SEC (ref 11.9–14.6)
PROTHROMBIN TIME: 17.6 SEC (ref 11.9–14.6)
PROTHROMBIN TIME: 17.8 SEC (ref 11.9–14.6)
PROTHROMBIN TIME: 17.9 SEC (ref 11.9–14.6)
PROTHROMBIN TIME: 17.9 SEC (ref 11.9–14.6)
PROTHROMBIN TIME: 18.6 SEC (ref 11.9–14.6)
PROTHROMBIN TIME: 18.7 SEC (ref 11.9–14.6)
PROTHROMBIN TIME: 18.8 SEC (ref 11.9–14.6)
PROTHROMBIN TIME: 18.8 SEC (ref 11.9–14.6)
PROTHROMBIN TIME: 19.3 SEC (ref 11.9–14.6)
PROTHROMBIN TIME: 19.4 SEC (ref 11.9–14.6)
PROTHROMBIN TIME: 19.4 SEC (ref 11.9–14.6)
PROTHROMBIN TIME: 19.8 SEC (ref 11.9–14.6)
PROTHROMBIN TIME: 20.9 SEC (ref 11.9–14.6)
PROTHROMBIN TIME: 21.1 SEC (ref 11.9–14.6)
PROTHROMBIN TIME: 21.2 SEC (ref 11.9–14.6)
PROTHROMBIN TIME: 21.5 SEC (ref 11.9–14.6)
PROTHROMBIN TIME: 22.6 SEC (ref 11.9–14.6)
Q-T INTERVAL, ECG07: 362 MS
Q-T INTERVAL, ECG07: 412 MS
Q-T INTERVAL, ECG07: 418 MS
Q-T INTERVAL, ECG07: 422 MS
Q-T INTERVAL, ECG07: 426 MS
Q-T INTERVAL, ECG07: 440 MS
Q-T INTERVAL, ECG07: 444 MS
Q-T INTERVAL, ECG07: 472 MS
Q-T INTERVAL, ECG07: 516 MS
QRS DURATION, ECG06: 100 MS
QRS DURATION, ECG06: 100 MS
QRS DURATION, ECG06: 82 MS
QRS DURATION, ECG06: 92 MS
QRS DURATION, ECG06: 92 MS
QRS DURATION, ECG06: 94 MS
QRS DURATION, ECG06: 98 MS
QTC CALCULATION (BEZET), ECG08: 450 MS
QTC CALCULATION (BEZET), ECG08: 454 MS
QTC CALCULATION (BEZET), ECG08: 454 MS
QTC CALCULATION (BEZET), ECG08: 474 MS
QTC CALCULATION (BEZET), ECG08: 475 MS
QTC CALCULATION (BEZET), ECG08: 478 MS
QTC CALCULATION (BEZET), ECG08: 482 MS
QTC CALCULATION (BEZET), ECG08: 510 MS
QTC CALCULATION (BEZET), ECG08: 527 MS
RBC # BLD AUTO: 2.26 M/UL (ref 3.8–5.2)
RBC # BLD AUTO: 2.32 M/UL (ref 3.8–5.2)
RBC # BLD AUTO: 2.38 M/UL (ref 3.8–5.2)
RBC # BLD AUTO: 2.43 M/UL (ref 3.8–5.2)
RBC # BLD AUTO: 2.45 M/UL (ref 3.8–5.2)
RBC # BLD AUTO: 2.46 M/UL (ref 3.8–5.2)
RBC # BLD AUTO: 2.49 M/UL (ref 3.8–5.2)
RBC # BLD AUTO: 2.5 M/UL (ref 3.8–5.2)
RBC # BLD AUTO: 2.55 M/UL (ref 3.8–5.2)
RBC # BLD AUTO: 2.58 M/UL (ref 3.8–5.2)
RBC # BLD AUTO: 2.61 M/UL (ref 3.8–5.2)
RBC # BLD AUTO: 2.63 M/UL (ref 3.8–5.2)
RBC # BLD AUTO: 2.64 M/UL (ref 3.8–5.2)
RBC # BLD AUTO: 2.65 M/UL (ref 3.8–5.2)
RBC # BLD AUTO: 2.65 M/UL (ref 3.8–5.2)
RBC # BLD AUTO: 2.67 M/UL (ref 3.8–5.2)
RBC # BLD AUTO: 2.72 M/UL (ref 3.8–5.2)
RBC # BLD AUTO: 2.72 M/UL (ref 3.8–5.2)
RBC # BLD AUTO: 2.74 M/UL (ref 3.8–5.2)
RBC # BLD AUTO: 2.75 M/UL (ref 3.8–5.2)
RBC # BLD AUTO: 2.76 M/UL (ref 3.8–5.2)
RBC # BLD AUTO: 2.77 M/UL (ref 3.8–5.2)
RBC # BLD AUTO: 2.78 M/UL (ref 3.8–5.2)
RBC # BLD AUTO: 2.81 M/UL (ref 3.8–5.2)
RBC # BLD AUTO: 2.82 M/UL (ref 3.8–5.2)
RBC # BLD AUTO: 2.82 M/UL (ref 3.8–5.2)
RBC # BLD AUTO: 2.86 M/UL (ref 3.8–5.2)
RBC # BLD AUTO: 2.87 M/UL (ref 3.8–5.2)
RBC # BLD AUTO: 2.92 M/UL (ref 3.8–5.2)
RBC # BLD AUTO: 2.98 M/UL (ref 3.8–5.2)
RBC # BLD AUTO: 2.98 M/UL (ref 3.8–5.2)
RBC # BLD AUTO: 2.99 M/UL (ref 3.8–5.2)
RBC # BLD AUTO: 3 M/UL (ref 3.8–5.2)
RBC # BLD AUTO: 3.02 M/UL (ref 3.8–5.2)
RBC # BLD AUTO: 3.03 M/UL (ref 3.8–5.2)
RBC # BLD AUTO: 3.11 M/UL (ref 3.8–5.2)
RBC # BLD AUTO: 3.12 M/UL (ref 3.8–5.2)
RBC # BLD AUTO: 3.16 M/UL (ref 3.8–5.2)
RBC # BLD AUTO: 3.16 M/UL (ref 3.8–5.2)
RBC # BLD AUTO: 3.17 M/UL (ref 3.8–5.2)
RBC # BLD AUTO: 3.18 M/UL (ref 3.8–5.2)
RBC # BLD AUTO: 3.18 M/UL (ref 3.8–5.2)
RBC # BLD AUTO: 3.19 M/UL (ref 3.8–5.2)
RBC # BLD AUTO: 3.19 M/UL (ref 3.8–5.2)
RBC # BLD AUTO: 3.21 M/UL (ref 3.8–5.2)
RBC # BLD AUTO: 3.24 M/UL (ref 3.8–5.2)
RBC # BLD AUTO: 3.24 M/UL (ref 3.8–5.2)
RBC # BLD AUTO: 3.29 M/UL (ref 3.8–5.2)
RBC # BLD AUTO: 3.3 M/UL (ref 3.8–5.2)
RBC # BLD AUTO: 3.33 M/UL (ref 3.8–5.2)
RBC # BLD AUTO: 3.37 M/UL (ref 3.8–5.2)
RBC # BLD AUTO: 3.39 M/UL (ref 3.8–5.2)
RBC # BLD AUTO: 3.41 M/UL (ref 3.8–5.2)
RBC # BLD AUTO: 3.43 M/UL (ref 3.8–5.2)
RBC # BLD AUTO: 3.44 M/UL (ref 3.8–5.2)
RBC # BLD AUTO: 3.45 M/UL (ref 3.8–5.2)
RBC # BLD AUTO: 3.46 M/UL (ref 3.8–5.2)
RBC # BLD AUTO: 3.48 M/UL (ref 3.8–5.2)
RBC # BLD AUTO: 3.53 M/UL (ref 3.8–5.2)
RBC # BLD AUTO: 3.57 M/UL (ref 3.8–5.2)
RBC # BLD AUTO: 3.57 M/UL (ref 3.8–5.2)
RBC # BLD AUTO: 3.59 M/UL (ref 3.8–5.2)
RBC # BLD AUTO: 3.69 M/UL (ref 3.8–5.2)
RBC # BLD AUTO: 3.78 M/UL (ref 3.8–5.2)
RBC # BLD AUTO: 3.83 M/UL (ref 3.8–5.2)
RBC # BLD AUTO: 4.03 M/UL (ref 3.8–5.2)
RBC # BLD AUTO: 4.2 M/UL (ref 3.8–5.2)
RBC # BLD AUTO: 4.3 M/UL (ref 3.8–5.2)
RBC # BLD AUTO: 4.34 M/UL (ref 3.8–5.2)
RBC #/AREA URNS HPF: 1 /HPF (ref 0–5)
RBC #/AREA URNS HPF: >100 /HPF (ref 0–5)
RBC #/AREA URNS HPF: ABNORMAL /HPF (ref 0–5)
RBC MORPH BLD: ABNORMAL
REPORTED DOSE,DOSE: NORMAL UNITS
RIGHT ABI: 0.57
RIGHT CFA DIST SYS PSV: 194 CM/S
RIGHT DIST ATA VELOCITY: 92.4 CM/S
RIGHT PERONEAL DIST VELOCITY: 23.7 CM/S
RIGHT POP A PROX VEL RATIO: 2.58
RIGHT POPLITEAL DIST SYS PSV: 114 CM/S
RIGHT POPLITEAL PROX SYS PSV: 336 CM/S
RIGHT POSTERIOR TIBIAL: 95 MMHG
RIGHT PROX PFA A PSV: 135 CM/S
RIGHT SFA DIST VEL RATIO: 1.11
RIGHT SFA MID VEL RATIO: 0.5
RIGHT SFA PROX VEL RATIO: 1.2
RIGHT SUPER FEMORAL DIST SYS PSV: 130 CM/S
RIGHT SUPER FEMORAL MID SYS PSV: 117 CM/S
RIGHT SUPER FEMORAL PROX SYS PSV: 224 CM/S
SAO2 % BLD: 100 %
SAO2 % BLD: 101 %
SAO2 % BLD: 91 %
SAO2 % BLD: 91 %
SAO2 % BLD: 95 %
SAO2 % BLD: 96 %
SAO2 % BLD: 97 %
SAO2 % BLD: 98 %
SAO2 % BLD: 99 %
SAO2 % BLDV: 101 % (ref 60–80)
SAO2% DEVICE SAO2% SENSOR NAME: ABNORMAL
SAO2% DEVICE SAO2% SENSOR NAME: NORMAL
SERIAL MONITORING: NORMAL
SODIUM SERPL-SCNC: 135 MMOL/L (ref 136–145)
SODIUM SERPL-SCNC: 136 MMOL/L (ref 136–145)
SODIUM SERPL-SCNC: 137 MMOL/L (ref 136–145)
SODIUM SERPL-SCNC: 138 MMOL/L (ref 136–145)
SODIUM SERPL-SCNC: 139 MMOL/L (ref 136–145)
SODIUM SERPL-SCNC: 139 MMOL/L (ref 136–145)
SODIUM SERPL-SCNC: 140 MMOL/L (ref 136–145)
SODIUM SERPL-SCNC: 141 MMOL/L (ref 136–145)
SODIUM SERPL-SCNC: 142 MMOL/L (ref 136–145)
SODIUM SERPL-SCNC: 142 MMOL/L (ref 136–145)
SODIUM SERPL-SCNC: 143 MMOL/L (ref 136–145)
SODIUM SERPL-SCNC: 143 MMOL/L (ref 136–145)
SODIUM SERPL-SCNC: 144 MMOL/L (ref 136–145)
SODIUM SERPL-SCNC: 145 MMOL/L (ref 136–145)
SODIUM SERPL-SCNC: 146 MMOL/L (ref 136–145)
SODIUM SERPL-SCNC: 147 MMOL/L (ref 136–145)
SODIUM SERPL-SCNC: 148 MMOL/L (ref 136–145)
SODIUM SERPL-SCNC: 149 MMOL/L (ref 136–145)
SODIUM SERPL-SCNC: 150 MMOL/L (ref 136–145)
SODIUM SERPL-SCNC: 150 MMOL/L (ref 136–145)
SODIUM SERPL-SCNC: 151 MMOL/L (ref 136–145)
SODIUM SERPL-SCNC: 151 MMOL/L (ref 136–145)
SODIUM SERPL-SCNC: 152 MMOL/L (ref 136–145)
SODIUM SERPL-SCNC: 153 MMOL/L (ref 136–145)
SODIUM SERPL-SCNC: 153 MMOL/L (ref 136–145)
SODIUM SERPL-SCNC: 154 MMOL/L (ref 136–145)
SODIUM SERPL-SCNC: 154 MMOL/L (ref 136–145)
SODIUM SERPL-SCNC: 155 MMOL/L (ref 136–145)
SODIUM SERPL-SCNC: 155 MMOL/L (ref 136–145)
SODIUM SERPL-SCNC: 156 MMOL/L (ref 136–145)
SP GR UR REFRACTOMETRY: 1 (ref 1–1.03)
SP GR UR REFRACTOMETRY: 1.01 (ref 1–1.03)
SP1: ABNORMAL
SP2: ABNORMAL
SP3: ABNORMAL
SPECIAL REQUESTS,SREQ: ABNORMAL
SPECIAL REQUESTS,SREQ: NORMAL
SPECIMEN EXP DATE BLD: NORMAL
SPECIMEN SITE: ABNORMAL
SPECIMEN SITE: NORMAL
STATUS OF UNIT,%ST: NORMAL
THERAPEUTIC RANGE,PTTT: NORMAL SEC (ref 82–109)
THERAPEUTIC RANGE,PTTT: NORMAL SEC (ref 82–109)
TIBC SERPL-MCNC: 141 UG/DL (ref 250–450)
TRANSFUSION STATUS PATIENT QL: NORMAL
TRIGL SERPL-MCNC: 202 MG/DL (ref ?–150)
TRIGL SERPL-MCNC: 246 MG/DL (ref ?–150)
TROPONIN-HIGH SENSITIVITY: 276 NG/L (ref 0–51)
TROPONIN-HIGH SENSITIVITY: 483 NG/L (ref 0–51)
TROPONIN-HIGH SENSITIVITY: 671 NG/L (ref 0–51)
TROPONIN-HIGH SENSITIVITY: 800 NG/L (ref 0–51)
TROPONIN-HIGH SENSITIVITY: 890 NG/L (ref 0–51)
UA: UC IF INDICATED,UAUC: ABNORMAL
UNIT DIVISION, %UDIV: 0
UROBILINOGEN UR QL STRIP.AUTO: 0.1 EU/DL (ref 0.1–1)
VANCOMYCIN SERPL-MCNC: 11.2 UG/ML
VANCOMYCIN SERPL-MCNC: 11.9 UG/ML
VANCOMYCIN SERPL-MCNC: 12.1 UG/ML
VANCOMYCIN SERPL-MCNC: 12.4 UG/ML
VANCOMYCIN SERPL-MCNC: 13 UG/ML
VANCOMYCIN SERPL-MCNC: 13.4 UG/ML
VANCOMYCIN SERPL-MCNC: 13.6 UG/ML
VANCOMYCIN SERPL-MCNC: 14.3 UG/ML
VANCOMYCIN SERPL-MCNC: 14.6 UG/ML
VANCOMYCIN SERPL-MCNC: 14.9 UG/ML
VANCOMYCIN SERPL-MCNC: 15.3 UG/ML
VANCOMYCIN SERPL-MCNC: 15.4 UG/ML
VANCOMYCIN SERPL-MCNC: 15.5 UG/ML
VANCOMYCIN SERPL-MCNC: 15.5 UG/ML
VANCOMYCIN SERPL-MCNC: 16.5 UG/ML
VANCOMYCIN SERPL-MCNC: 16.9 UG/ML
VANCOMYCIN SERPL-MCNC: 17.6 UG/ML
VANCOMYCIN SERPL-MCNC: 18.6 UG/ML
VANCOMYCIN SERPL-MCNC: 19.8 UG/ML
VANCOMYCIN SERPL-MCNC: 20.6 UG/ML
VANCOMYCIN SERPL-MCNC: 24.2 UG/ML
VANCOMYCIN SERPL-MCNC: 24.5 UG/ML
VANCOMYCIN SERPL-MCNC: 24.9 UG/ML
VANCOMYCIN TROUGH SERPL-MCNC: 15.9 UG/ML (ref 5–10)
VANCOMYCIN TROUGH SERPL-MCNC: 16.4 UG/ML (ref 5–10)
VANCOMYCIN TROUGH SERPL-MCNC: 17 UG/ML (ref 5–10)
VANCOMYCIN TROUGH SERPL-MCNC: 17.7 UG/ML (ref 5–10)
VANCOMYCIN TROUGH SERPL-MCNC: 21.5 UG/ML (ref 5–10)
VANCOMYCIN TROUGH SERPL-MCNC: 28.2 UG/ML (ref 5–10)
VAS LEFT DORSALIS PEDIS BP: 89 MMHG
VENTILATION MODE VENT: ABNORMAL
VENTRICULAR RATE, ECG03: 59 BPM
VENTRICULAR RATE, ECG03: 63 BPM
VENTRICULAR RATE, ECG03: 70 BPM
VENTRICULAR RATE, ECG03: 71 BPM
VENTRICULAR RATE, ECG03: 75 BPM
VENTRICULAR RATE, ECG03: 76 BPM
VENTRICULAR RATE, ECG03: 77 BPM
VENTRICULAR RATE, ECG03: 81 BPM
VENTRICULAR RATE, ECG03: 93 BPM
VLDLC SERPL CALC-MCNC: 49.2 MG/DL
VT SETTING VENT: 500 ML
VT SETTING VENT: 550 ML
VT SETTING VENT: 550 ML
WBC # BLD AUTO: 11.4 K/UL (ref 3.6–11)
WBC # BLD AUTO: 11.6 K/UL (ref 3.6–11)
WBC # BLD AUTO: 11.8 K/UL (ref 3.6–11)
WBC # BLD AUTO: 11.8 K/UL (ref 3.6–11)
WBC # BLD AUTO: 11.9 K/UL (ref 3.6–11)
WBC # BLD AUTO: 12 K/UL (ref 3.6–11)
WBC # BLD AUTO: 12.2 K/UL (ref 3.6–11)
WBC # BLD AUTO: 12.2 K/UL (ref 3.6–11)
WBC # BLD AUTO: 12.4 K/UL (ref 3.6–11)
WBC # BLD AUTO: 12.5 K/UL (ref 3.6–11)
WBC # BLD AUTO: 12.5 K/UL (ref 3.6–11)
WBC # BLD AUTO: 12.8 K/UL (ref 3.6–11)
WBC # BLD AUTO: 12.9 K/UL (ref 3.6–11)
WBC # BLD AUTO: 13.1 K/UL (ref 3.6–11)
WBC # BLD AUTO: 13.2 K/UL (ref 3.6–11)
WBC # BLD AUTO: 13.3 K/UL (ref 3.6–11)
WBC # BLD AUTO: 13.5 K/UL (ref 3.6–11)
WBC # BLD AUTO: 13.7 K/UL (ref 3.6–11)
WBC # BLD AUTO: 13.8 K/UL (ref 3.6–11)
WBC # BLD AUTO: 13.9 K/UL (ref 3.6–11)
WBC # BLD AUTO: 14.1 K/UL (ref 3.6–11)
WBC # BLD AUTO: 14.3 K/UL (ref 3.6–11)
WBC # BLD AUTO: 14.6 K/UL (ref 3.6–11)
WBC # BLD AUTO: 14.7 K/UL (ref 3.6–11)
WBC # BLD AUTO: 14.7 K/UL (ref 3.6–11)
WBC # BLD AUTO: 14.8 K/UL (ref 3.6–11)
WBC # BLD AUTO: 15 K/UL (ref 3.6–11)
WBC # BLD AUTO: 15 K/UL (ref 3.6–11)
WBC # BLD AUTO: 15.1 K/UL (ref 3.6–11)
WBC # BLD AUTO: 15.1 K/UL (ref 3.6–11)
WBC # BLD AUTO: 15.2 K/UL (ref 3.6–11)
WBC # BLD AUTO: 15.2 K/UL (ref 3.6–11)
WBC # BLD AUTO: 15.7 K/UL (ref 3.6–11)
WBC # BLD AUTO: 15.9 K/UL (ref 3.6–11)
WBC # BLD AUTO: 16.2 K/UL (ref 3.6–11)
WBC # BLD AUTO: 16.6 K/UL (ref 3.6–11)
WBC # BLD AUTO: 16.8 K/UL (ref 3.6–11)
WBC # BLD AUTO: 16.9 K/UL (ref 3.6–11)
WBC # BLD AUTO: 16.9 K/UL (ref 3.6–11)
WBC # BLD AUTO: 17.1 K/UL (ref 3.6–11)
WBC # BLD AUTO: 17.2 K/UL (ref 3.6–11)
WBC # BLD AUTO: 17.3 K/UL (ref 3.6–11)
WBC # BLD AUTO: 17.6 K/UL (ref 3.6–11)
WBC # BLD AUTO: 17.7 K/UL (ref 3.6–11)
WBC # BLD AUTO: 17.7 K/UL (ref 3.6–11)
WBC # BLD AUTO: 18.4 K/UL (ref 3.6–11)
WBC # BLD AUTO: 18.9 K/UL (ref 3.6–11)
WBC # BLD AUTO: 19.8 K/UL (ref 3.6–11)
WBC # BLD AUTO: 20 K/UL (ref 3.6–11)
WBC # BLD AUTO: 22.3 K/UL (ref 3.6–11)
WBC # BLD AUTO: 22.3 K/UL (ref 3.6–11)
WBC # BLD AUTO: 22.5 K/UL (ref 3.6–11)
WBC # BLD AUTO: 24 K/UL (ref 3.6–11)
WBC # BLD AUTO: 24.8 K/UL (ref 3.6–11)
WBC # BLD AUTO: 26.8 K/UL (ref 3.6–11)
WBC # BLD AUTO: 8.4 K/UL (ref 3.6–11)
WBC # BLD AUTO: 8.5 K/UL (ref 3.6–11)
WBC # BLD AUTO: 8.9 K/UL (ref 3.6–11)
WBC CASTS URNS QL MICRO: PRESENT
WBC URNS QL MICRO: 4 /HPF (ref 0–4)
WBC URNS QL MICRO: >100 /HPF (ref 0–4)
WBC URNS QL MICRO: ABNORMAL /HPF (ref 0–4)
YEAST URNS QL MICRO: ABNORMAL
YEAST URNS QL MICRO: PRESENT

## 2022-01-01 PROCEDURE — 74011250636 HC RX REV CODE- 250/636: Performed by: FAMILY MEDICINE

## 2022-01-01 PROCEDURE — 74011250636 HC RX REV CODE- 250/636: Performed by: INTERNAL MEDICINE

## 2022-01-01 PROCEDURE — 74011250636 HC RX REV CODE- 250/636: Performed by: SURGERY

## 2022-01-01 PROCEDURE — 77010033678 HC OXYGEN DAILY

## 2022-01-01 PROCEDURE — 74011000250 HC RX REV CODE- 250: Performed by: RADIOLOGY

## 2022-01-01 PROCEDURE — 74011250637 HC RX REV CODE- 250/637: Performed by: FAMILY MEDICINE

## 2022-01-01 PROCEDURE — 94660 CPAP INITIATION&MGMT: CPT

## 2022-01-01 PROCEDURE — P9016 RBC LEUKOCYTES REDUCED: HCPCS

## 2022-01-01 PROCEDURE — 65270000029 HC RM PRIVATE

## 2022-01-01 PROCEDURE — 80076 HEPATIC FUNCTION PANEL: CPT

## 2022-01-01 PROCEDURE — 74011250637 HC RX REV CODE- 250/637: Performed by: SURGERY

## 2022-01-01 PROCEDURE — 85610 PROTHROMBIN TIME: CPT

## 2022-01-01 PROCEDURE — 94003 VENT MGMT INPAT SUBQ DAY: CPT

## 2022-01-01 PROCEDURE — 0656 HSPC GENERAL INPATIENT

## 2022-01-01 PROCEDURE — 99233 SBSQ HOSP IP/OBS HIGH 50: CPT | Performed by: INTERNAL MEDICINE

## 2022-01-01 PROCEDURE — 74176 CT ABD & PELVIS W/O CONTRAST: CPT

## 2022-01-01 PROCEDURE — 80202 ASSAY OF VANCOMYCIN: CPT

## 2022-01-01 PROCEDURE — 74011636637 HC RX REV CODE- 636/637: Performed by: FAMILY MEDICINE

## 2022-01-01 PROCEDURE — 86140 C-REACTIVE PROTEIN: CPT

## 2022-01-01 PROCEDURE — 74011250637 HC RX REV CODE- 250/637: Performed by: INTERNAL MEDICINE

## 2022-01-01 PROCEDURE — 85025 COMPLETE CBC W/AUTO DIFF WBC: CPT

## 2022-01-01 PROCEDURE — 84100 ASSAY OF PHOSPHORUS: CPT

## 2022-01-01 PROCEDURE — 84145 PROCALCITONIN (PCT): CPT

## 2022-01-01 PROCEDURE — 82962 GLUCOSE BLOOD TEST: CPT

## 2022-01-01 PROCEDURE — 65270000032 HC RM SEMIPRIVATE

## 2022-01-01 PROCEDURE — 74011250637 HC RX REV CODE- 250/637: Performed by: PSYCHIATRY & NEUROLOGY

## 2022-01-01 PROCEDURE — 74011000258 HC RX REV CODE- 258: Performed by: INTERNAL MEDICINE

## 2022-01-01 PROCEDURE — 99232 SBSQ HOSP IP/OBS MODERATE 35: CPT | Performed by: INTERNAL MEDICINE

## 2022-01-01 PROCEDURE — 74011000258 HC RX REV CODE- 258: Performed by: FAMILY MEDICINE

## 2022-01-01 PROCEDURE — 74011000250 HC RX REV CODE- 250: Performed by: FAMILY MEDICINE

## 2022-01-01 PROCEDURE — 92610 EVALUATE SWALLOWING FUNCTION: CPT

## 2022-01-01 PROCEDURE — 80053 COMPREHEN METABOLIC PANEL: CPT

## 2022-01-01 PROCEDURE — 74011250637 HC RX REV CODE- 250/637: Performed by: LEGAL MEDICINE

## 2022-01-01 PROCEDURE — 92611 MOTION FLUOROSCOPY/SWALLOW: CPT

## 2022-01-01 PROCEDURE — 97530 THERAPEUTIC ACTIVITIES: CPT

## 2022-01-01 PROCEDURE — 0QBR0ZZ EXCISION OF LEFT TOE PHALANX, OPEN APPROACH: ICD-10-PCS | Performed by: PODIATRIST

## 2022-01-01 PROCEDURE — 82803 BLOOD GASES ANY COMBINATION: CPT

## 2022-01-01 PROCEDURE — 74011636637 HC RX REV CODE- 636/637: Performed by: INTERNAL MEDICINE

## 2022-01-01 PROCEDURE — 11044 DBRDMT BONE 1ST 20 SQ CM/<: CPT | Performed by: PODIATRIST

## 2022-01-01 PROCEDURE — 96367 TX/PROPH/DG ADDL SEQ IV INF: CPT

## 2022-01-01 PROCEDURE — 74011636637 HC RX REV CODE- 636/637: Performed by: SURGERY

## 2022-01-01 PROCEDURE — 36415 COLL VENOUS BLD VENIPUNCTURE: CPT

## 2022-01-01 PROCEDURE — 99221 1ST HOSP IP/OBS SF/LOW 40: CPT | Performed by: UROLOGY

## 2022-01-01 PROCEDURE — 87205 SMEAR GRAM STAIN: CPT

## 2022-01-01 PROCEDURE — 85027 COMPLETE CBC AUTOMATED: CPT

## 2022-01-01 PROCEDURE — 71045 X-RAY EXAM CHEST 1 VIEW: CPT

## 2022-01-01 PROCEDURE — 74011000250 HC RX REV CODE- 250: Performed by: INTERNAL MEDICINE

## 2022-01-01 PROCEDURE — 77030002933 HC SUT MCRYL J&J -A: Performed by: SURGERY

## 2022-01-01 PROCEDURE — 83735 ASSAY OF MAGNESIUM: CPT

## 2022-01-01 PROCEDURE — 80069 RENAL FUNCTION PANEL: CPT

## 2022-01-01 PROCEDURE — 87176 TISSUE HOMOGENIZATION CULTR: CPT

## 2022-01-01 PROCEDURE — 70450 CT HEAD/BRAIN W/O DYE: CPT

## 2022-01-01 PROCEDURE — 3336500001 HSPC ELECTION

## 2022-01-01 PROCEDURE — 36600 WITHDRAWAL OF ARTERIAL BLOOD: CPT

## 2022-01-01 PROCEDURE — 92507 TX SP LANG VOICE COMM INDIV: CPT

## 2022-01-01 PROCEDURE — 76010000149 HC OR TIME 1 TO 1.5 HR: Performed by: SURGERY

## 2022-01-01 PROCEDURE — 84484 ASSAY OF TROPONIN QUANT: CPT

## 2022-01-01 PROCEDURE — 99232 SBSQ HOSP IP/OBS MODERATE 35: CPT | Performed by: PODIATRIST

## 2022-01-01 PROCEDURE — 99291 CRITICAL CARE FIRST HOUR: CPT | Performed by: SURGERY

## 2022-01-01 PROCEDURE — 87086 URINE CULTURE/COLONY COUNT: CPT

## 2022-01-01 PROCEDURE — 93005 ELECTROCARDIOGRAM TRACING: CPT

## 2022-01-01 PROCEDURE — 74011000258 HC RX REV CODE- 258: Performed by: EMERGENCY MEDICINE

## 2022-01-01 PROCEDURE — 83036 HEMOGLOBIN GLYCOSYLATED A1C: CPT

## 2022-01-01 PROCEDURE — 65610000006 HC RM INTENSIVE CARE

## 2022-01-01 PROCEDURE — 74011000258 HC RX REV CODE- 258: Performed by: SURGERY

## 2022-01-01 PROCEDURE — 94761 N-INVAS EAR/PLS OXIMETRY MLT: CPT

## 2022-01-01 PROCEDURE — 94640 AIRWAY INHALATION TREATMENT: CPT

## 2022-01-01 PROCEDURE — 76060000033 HC ANESTHESIA 1 TO 1.5 HR: Performed by: SURGERY

## 2022-01-01 PROCEDURE — 86923 COMPATIBILITY TEST ELECTRIC: CPT

## 2022-01-01 PROCEDURE — 70551 MRI BRAIN STEM W/O DYE: CPT

## 2022-01-01 PROCEDURE — 87186 SC STD MICRODIL/AGAR DIL: CPT

## 2022-01-01 PROCEDURE — 77030008606 HC TRCR ENDOSC KII AMR -B: Performed by: SURGERY

## 2022-01-01 PROCEDURE — 99232 SBSQ HOSP IP/OBS MODERATE 35: CPT | Performed by: SURGERY

## 2022-01-01 PROCEDURE — 76010000154 HC OR TIME FIRST 0.5 HR: Performed by: SURGERY

## 2022-01-01 PROCEDURE — 93922 UPR/L XTREMITY ART 2 LEVELS: CPT

## 2022-01-01 PROCEDURE — 76210000016 HC OR PH I REC 1 TO 1.5 HR: Performed by: SURGERY

## 2022-01-01 PROCEDURE — 86900 BLOOD TYPING SEROLOGIC ABO: CPT

## 2022-01-01 PROCEDURE — 88305 TISSUE EXAM BY PATHOLOGIST: CPT

## 2022-01-01 PROCEDURE — 11044 DBRDMT BONE 1ST 20 SQ CM/<: CPT | Performed by: SURGERY

## 2022-01-01 PROCEDURE — 99285 EMERGENCY DEPT VISIT HI MDM: CPT

## 2022-01-01 PROCEDURE — 76937 US GUIDE VASCULAR ACCESS: CPT

## 2022-01-01 PROCEDURE — 74011250636 HC RX REV CODE- 250/636: Performed by: LEGAL MEDICINE

## 2022-01-01 PROCEDURE — 77030040361 HC SLV COMPR DVT MDII -B: Performed by: SURGERY

## 2022-01-01 PROCEDURE — 76010000131 HC OR TIME 2 TO 2.5 HR: Performed by: SURGERY

## 2022-01-01 PROCEDURE — 96365 THER/PROPH/DIAG IV INF INIT: CPT

## 2022-01-01 PROCEDURE — 74230 X-RAY XM SWLNG FUNCJ C+: CPT

## 2022-01-01 PROCEDURE — 80048 BASIC METABOLIC PNL TOTAL CA: CPT

## 2022-01-01 PROCEDURE — 74011000250 HC RX REV CODE- 250: Performed by: SURGERY

## 2022-01-01 PROCEDURE — 2709999900 HC NON-CHARGEABLE SUPPLY: Performed by: SURGERY

## 2022-01-01 PROCEDURE — 74011250636 HC RX REV CODE- 250/636: Performed by: PSYCHIATRY & NEUROLOGY

## 2022-01-01 PROCEDURE — 36430 TRANSFUSION BLD/BLD COMPNT: CPT

## 2022-01-01 PROCEDURE — 87040 BLOOD CULTURE FOR BACTERIA: CPT

## 2022-01-01 PROCEDURE — 80061 LIPID PANEL: CPT

## 2022-01-01 PROCEDURE — 77030018684: Performed by: SURGERY

## 2022-01-01 PROCEDURE — 85730 THROMBOPLASTIN TIME PARTIAL: CPT

## 2022-01-01 PROCEDURE — 74011250637 HC RX REV CODE- 250/637: Performed by: STUDENT IN AN ORGANIZED HEALTH CARE EDUCATION/TRAINING PROGRAM

## 2022-01-01 PROCEDURE — 87635 SARS-COV-2 COVID-19 AMP PRB: CPT

## 2022-01-01 PROCEDURE — P9059 PLASMA, FRZ BETWEEN 8-24HOUR: HCPCS

## 2022-01-01 PROCEDURE — 30233N1 TRANSFUSION OF NONAUTOLOGOUS RED BLOOD CELLS INTO PERIPHERAL VEIN, PERCUTANEOUS APPROACH: ICD-10-PCS | Performed by: FAMILY MEDICINE

## 2022-01-01 PROCEDURE — 74011250636 HC RX REV CODE- 250/636: Performed by: ANESTHESIOLOGY

## 2022-01-01 PROCEDURE — 87077 CULTURE AEROBIC IDENTIFY: CPT

## 2022-01-01 PROCEDURE — 99222 1ST HOSP IP/OBS MODERATE 55: CPT | Performed by: SURGERY

## 2022-01-01 PROCEDURE — 77001 FLUOROGUIDE FOR VEIN DEVICE: CPT

## 2022-01-01 PROCEDURE — 76210000006 HC OR PH I REC 0.5 TO 1 HR: Performed by: SURGERY

## 2022-01-01 PROCEDURE — 99024 POSTOP FOLLOW-UP VISIT: CPT | Performed by: SURGERY

## 2022-01-01 PROCEDURE — 51798 US URINE CAPACITY MEASURE: CPT

## 2022-01-01 PROCEDURE — 76060000032 HC ANESTHESIA 0.5 TO 1 HR: Performed by: SURGERY

## 2022-01-01 PROCEDURE — 74011636637 HC RX REV CODE- 636/637: Performed by: STUDENT IN AN ORGANIZED HEALTH CARE EDUCATION/TRAINING PROGRAM

## 2022-01-01 PROCEDURE — 74011250636 HC RX REV CODE- 250/636: Performed by: NURSE ANESTHETIST, CERTIFIED REGISTERED

## 2022-01-01 PROCEDURE — 77030002966 HC SUT PDS J&J -A: Performed by: SURGERY

## 2022-01-01 PROCEDURE — P9047 ALBUMIN (HUMAN), 25%, 50ML: HCPCS | Performed by: INTERNAL MEDICINE

## 2022-01-01 PROCEDURE — 82330 ASSAY OF CALCIUM: CPT

## 2022-01-01 PROCEDURE — 0Y6N0Z9 DETACHMENT AT LEFT FOOT, PARTIAL 1ST RAY, OPEN APPROACH: ICD-10-PCS | Performed by: SURGERY

## 2022-01-01 PROCEDURE — 81001 URINALYSIS AUTO W/SCOPE: CPT

## 2022-01-01 PROCEDURE — 74011250636 HC RX REV CODE- 250/636

## 2022-01-01 PROCEDURE — 77030003029 HC SUT VCRL J&J -B: Performed by: SURGERY

## 2022-01-01 PROCEDURE — 3E0G76Z INTRODUCTION OF NUTRITIONAL SUBSTANCE INTO UPPER GI, VIA NATURAL OR ARTIFICIAL OPENING: ICD-10-PCS | Performed by: INTERNAL MEDICINE

## 2022-01-01 PROCEDURE — 76060000031 HC ANESTHESIA FIRST 0.5 HR: Performed by: SURGERY

## 2022-01-01 PROCEDURE — 93306 TTE W/DOPPLER COMPLETE: CPT

## 2022-01-01 PROCEDURE — 87181 SC STD AGAR DILUTION PER AGT: CPT

## 2022-01-01 PROCEDURE — 74011000250 HC RX REV CODE- 250: Performed by: STUDENT IN AN ORGANIZED HEALTH CARE EDUCATION/TRAINING PROGRAM

## 2022-01-01 PROCEDURE — 83880 ASSAY OF NATRIURETIC PEPTIDE: CPT

## 2022-01-01 PROCEDURE — 76010000138 HC OR TIME 0.5 TO 1 HR: Performed by: SURGERY

## 2022-01-01 PROCEDURE — 70496 CT ANGIOGRAPHY HEAD: CPT

## 2022-01-01 PROCEDURE — 74011000258 HC RX REV CODE- 258

## 2022-01-01 PROCEDURE — 30233K1 TRANSFUSION OF NONAUTOLOGOUS FROZEN PLASMA INTO PERIPHERAL VEIN, PERCUTANEOUS APPROACH: ICD-10-PCS | Performed by: FAMILY MEDICINE

## 2022-01-01 PROCEDURE — 77030005513 HC CATH URETH FOL11 MDII -B: Performed by: SURGERY

## 2022-01-01 PROCEDURE — 92526 ORAL FUNCTION THERAPY: CPT

## 2022-01-01 PROCEDURE — 84478 ASSAY OF TRIGLYCERIDES: CPT

## 2022-01-01 PROCEDURE — 0Y6Q0Z0 DETACHMENT AT LEFT 1ST TOE, COMPLETE, OPEN APPROACH: ICD-10-PCS | Performed by: SURGERY

## 2022-01-01 PROCEDURE — 74011250636 HC RX REV CODE- 250/636: Performed by: STUDENT IN AN ORGANIZED HEALTH CARE EDUCATION/TRAINING PROGRAM

## 2022-01-01 PROCEDURE — 83605 ASSAY OF LACTIC ACID: CPT

## 2022-01-01 PROCEDURE — 77030002996 HC SUT SLK J&J -A: Performed by: SURGERY

## 2022-01-01 PROCEDURE — 74011250636 HC RX REV CODE- 250/636: Performed by: NURSE PRACTITIONER

## 2022-01-01 PROCEDURE — 97605 NEG PRS WND THER DME<=50SQCM: CPT

## 2022-01-01 PROCEDURE — G0378 HOSPITAL OBSERVATION PER HR: HCPCS

## 2022-01-01 PROCEDURE — 77030016151 HC PROTCTR LNS DFOG COVD -B: Performed by: SURGERY

## 2022-01-01 PROCEDURE — 30233N1 TRANSFUSION OF NONAUTOLOGOUS RED BLOOD CELLS INTO PERIPHERAL VEIN, PERCUTANEOUS APPROACH: ICD-10-PCS | Performed by: INTERNAL MEDICINE

## 2022-01-01 PROCEDURE — 74011250636 HC RX REV CODE- 250/636: Performed by: EMERGENCY MEDICINE

## 2022-01-01 PROCEDURE — 99221 1ST HOSP IP/OBS SF/LOW 40: CPT | Performed by: SURGERY

## 2022-01-01 PROCEDURE — 93971 EXTREMITY STUDY: CPT | Performed by: SURGERY

## 2022-01-01 PROCEDURE — 5A1955Z RESPIRATORY VENTILATION, GREATER THAN 96 CONSECUTIVE HOURS: ICD-10-PCS | Performed by: FAMILY MEDICINE

## 2022-01-01 PROCEDURE — 80074 ACUTE HEPATITIS PANEL: CPT

## 2022-01-01 PROCEDURE — 77030018706 HC CORD MPLR COVD -A: Performed by: SURGERY

## 2022-01-01 PROCEDURE — 11045 DBRDMT SUBQ TISS EACH ADDL: CPT | Performed by: SURGERY

## 2022-01-01 PROCEDURE — 74011250637 HC RX REV CODE- 250/637: Performed by: COLON & RECTAL SURGERY

## 2022-01-01 PROCEDURE — 87184 SC STD DISK METHOD PER PLATE: CPT

## 2022-01-01 PROCEDURE — 87521 HEPATITIS C PROBE&RVRS TRNSC: CPT

## 2022-01-01 PROCEDURE — 84132 ASSAY OF SERUM POTASSIUM: CPT

## 2022-01-01 PROCEDURE — 99223 1ST HOSP IP/OBS HIGH 75: CPT | Performed by: PODIATRIST

## 2022-01-01 PROCEDURE — 93925 LOWER EXTREMITY STUDY: CPT

## 2022-01-01 PROCEDURE — 28805 AMPUTATION THRU METATARSAL: CPT | Performed by: SURGERY

## 2022-01-01 PROCEDURE — 76040000019: Performed by: INTERNAL MEDICINE

## 2022-01-01 PROCEDURE — 74011250637 HC RX REV CODE- 250/637: Performed by: NURSE PRACTITIONER

## 2022-01-01 PROCEDURE — 84295 ASSAY OF SERUM SODIUM: CPT

## 2022-01-01 PROCEDURE — 85018 HEMOGLOBIN: CPT

## 2022-01-01 PROCEDURE — 0Y6N0ZC DETACHMENT AT LEFT FOOT, PARTIAL 3RD RAY, OPEN APPROACH: ICD-10-PCS | Performed by: SURGERY

## 2022-01-01 PROCEDURE — 76700 US EXAM ABDOM COMPLETE: CPT

## 2022-01-01 PROCEDURE — 0Y6N0ZB DETACHMENT AT LEFT FOOT, PARTIAL 2ND RAY, OPEN APPROACH: ICD-10-PCS | Performed by: SURGERY

## 2022-01-01 PROCEDURE — 0QBP0ZZ EXCISION OF LEFT METATARSAL, OPEN APPROACH: ICD-10-PCS | Performed by: SURGERY

## 2022-01-01 PROCEDURE — 0KBN0ZZ EXCISION OF RIGHT HIP MUSCLE, OPEN APPROACH: ICD-10-PCS | Performed by: SURGERY

## 2022-01-01 PROCEDURE — 11042 DBRDMT SUBQ TIS 1ST 20SQCM/<: CPT | Performed by: SURGERY

## 2022-01-01 PROCEDURE — 11047 DBRDMT BONE EACH ADDL: CPT | Performed by: SURGERY

## 2022-01-01 PROCEDURE — 77030031139 HC SUT VCRL2 J&J -A: Performed by: SURGERY

## 2022-01-01 PROCEDURE — 51702 INSERT TEMP BLADDER CATH: CPT

## 2022-01-01 PROCEDURE — 0Y6S0Z0 DETACHMENT AT LEFT 2ND TOE, COMPLETE, OPEN APPROACH: ICD-10-PCS | Performed by: SURGERY

## 2022-01-01 PROCEDURE — 74011000250 HC RX REV CODE- 250: Performed by: NURSE ANESTHETIST, CERTIFIED REGISTERED

## 2022-01-01 PROCEDURE — 82435 ASSAY OF BLOOD CHLORIDE: CPT

## 2022-01-01 PROCEDURE — 0Y6N0ZF DETACHMENT AT LEFT FOOT, PARTIAL 5TH RAY, OPEN APPROACH: ICD-10-PCS | Performed by: SURGERY

## 2022-01-01 PROCEDURE — 93971 EXTREMITY STUDY: CPT

## 2022-01-01 PROCEDURE — 0DH63UZ INSERTION OF FEEDING DEVICE INTO STOMACH, PERCUTANEOUS APPROACH: ICD-10-PCS | Performed by: INTERNAL MEDICINE

## 2022-01-01 PROCEDURE — 74011000636 HC RX REV CODE- 636: Performed by: INTERNAL MEDICINE

## 2022-01-01 PROCEDURE — 82565 ASSAY OF CREATININE: CPT

## 2022-01-01 PROCEDURE — 96375 TX/PRO/DX INJ NEW DRUG ADDON: CPT

## 2022-01-01 PROCEDURE — 74011250636 HC RX REV CODE- 250/636: Performed by: HOSPITALIST

## 2022-01-01 PROCEDURE — 99232 SBSQ HOSP IP/OBS MODERATE 35: CPT | Performed by: COLON & RECTAL SURGERY

## 2022-01-01 PROCEDURE — 94002 VENT MGMT INPAT INIT DAY: CPT

## 2022-01-01 PROCEDURE — 87185 SC STD ENZYME DETCJ PER NZM: CPT

## 2022-01-01 PROCEDURE — 93308 TTE F-UP OR LMTD: CPT

## 2022-01-01 PROCEDURE — 74018 RADEX ABDOMEN 1 VIEW: CPT

## 2022-01-01 PROCEDURE — 77030005122 HC CATH GASTMY PEG BSC -B: Performed by: INTERNAL MEDICINE

## 2022-01-01 PROCEDURE — 94760 N-INVAS EAR/PLS OXIMETRY 1: CPT

## 2022-01-01 PROCEDURE — 76705 ECHO EXAM OF ABDOMEN: CPT

## 2022-01-01 PROCEDURE — 83540 ASSAY OF IRON: CPT

## 2022-01-01 PROCEDURE — 0BH17EZ INSERTION OF ENDOTRACHEAL AIRWAY INTO TRACHEA, VIA NATURAL OR ARTIFICIAL OPENING: ICD-10-PCS | Performed by: FAMILY MEDICINE

## 2022-01-01 PROCEDURE — 96366 THER/PROPH/DIAG IV INF ADDON: CPT

## 2022-01-01 PROCEDURE — 74011000258 HC RX REV CODE- 258: Performed by: NURSE ANESTHETIST, CERTIFIED REGISTERED

## 2022-01-01 PROCEDURE — 96372 THER/PROPH/DIAG INJ SC/IM: CPT

## 2022-01-01 PROCEDURE — 92523 SPEECH SOUND LANG COMPREHEN: CPT

## 2022-01-01 PROCEDURE — 77030018813 HC SCIS LAPSCP EPIX DISP AMR -B: Performed by: SURGERY

## 2022-01-01 PROCEDURE — 0Y6N0ZD DETACHMENT AT LEFT FOOT, PARTIAL 4TH RAY, OPEN APPROACH: ICD-10-PCS | Performed by: SURGERY

## 2022-01-01 PROCEDURE — 76060000031 HC ANESTHESIA FIRST 0.5 HR: Performed by: INTERNAL MEDICINE

## 2022-01-01 PROCEDURE — 88311 DECALCIFY TISSUE: CPT

## 2022-01-01 PROCEDURE — 87641 MR-STAPH DNA AMP PROBE: CPT

## 2022-01-01 PROCEDURE — 76040000007: Performed by: INTERNAL MEDICINE

## 2022-01-01 PROCEDURE — 02H633Z INSERTION OF INFUSION DEVICE INTO RIGHT ATRIUM, PERCUTANEOUS APPROACH: ICD-10-PCS | Performed by: RADIOLOGY

## 2022-01-01 PROCEDURE — 76060000032 HC ANESTHESIA 0.5 TO 1 HR: Performed by: INTERNAL MEDICINE

## 2022-01-01 PROCEDURE — 05HM33Z INSERTION OF INFUSION DEVICE INTO RIGHT INTERNAL JUGULAR VEIN, PERCUTANEOUS APPROACH: ICD-10-PCS | Performed by: RADIOLOGY

## 2022-01-01 PROCEDURE — 0JB70ZZ EXCISION OF BACK SUBCUTANEOUS TISSUE AND FASCIA, OPEN APPROACH: ICD-10-PCS | Performed by: SURGERY

## 2022-01-01 PROCEDURE — 74011000250 HC RX REV CODE- 250

## 2022-01-01 PROCEDURE — 99223 1ST HOSP IP/OBS HIGH 75: CPT | Performed by: INTERNAL MEDICINE

## 2022-01-01 PROCEDURE — 73620 X-RAY EXAM OF FOOT: CPT

## 2022-01-01 PROCEDURE — 99222 1ST HOSP IP/OBS MODERATE 55: CPT | Performed by: INTERNAL MEDICINE

## 2022-01-01 PROCEDURE — 85384 FIBRINOGEN ACTIVITY: CPT

## 2022-01-01 PROCEDURE — 77030019905 HC CATH URETH INTMIT MDII -A: Performed by: SURGERY

## 2022-01-01 PROCEDURE — 96374 THER/PROPH/DIAG INJ IV PUSH: CPT

## 2022-01-01 PROCEDURE — 82550 ASSAY OF CK (CPK): CPT

## 2022-01-01 PROCEDURE — 76060000035 HC ANESTHESIA 2 TO 2.5 HR: Performed by: SURGERY

## 2022-01-01 PROCEDURE — 77030010507 HC ADH SKN DERMBND J&J -B: Performed by: SURGERY

## 2022-01-01 PROCEDURE — 87070 CULTURE OTHR SPECIMN AEROBIC: CPT

## 2022-01-01 PROCEDURE — 77030012799 HC TRCR GELPRT BLN AMR -B: Performed by: SURGERY

## 2022-01-01 PROCEDURE — C1894 INTRO/SHEATH, NON-LASER: HCPCS

## 2022-01-01 PROCEDURE — 74011000636 HC RX REV CODE- 636: Performed by: FAMILY MEDICINE

## 2022-01-01 PROCEDURE — 97162 PT EVAL MOD COMPLEX 30 MIN: CPT

## 2022-01-01 PROCEDURE — 77030008756 HC TU IRR SUC STRY -B: Performed by: SURGERY

## 2022-01-01 PROCEDURE — 97165 OT EVAL LOW COMPLEX 30 MIN: CPT

## 2022-01-01 PROCEDURE — 28820 AMPUTATION OF TOE: CPT | Performed by: SURGERY

## 2022-01-01 PROCEDURE — 77030018842 HC SOL IRR SOD CL 9% BAXT -A: Performed by: SURGERY

## 2022-01-01 PROCEDURE — 82728 ASSAY OF FERRITIN: CPT

## 2022-01-01 RX ORDER — DICYCLOMINE HYDROCHLORIDE 10 MG/1
10 CAPSULE ORAL 3 TIMES DAILY
Status: DISCONTINUED | OUTPATIENT
Start: 2022-01-01 | End: 2022-01-01

## 2022-01-01 RX ORDER — SODIUM CHLORIDE 0.9 % (FLUSH) 0.9 %
5-40 SYRINGE (ML) INJECTION EVERY 8 HOURS
Status: DISCONTINUED | OUTPATIENT
Start: 2022-01-01 | End: 2022-01-01 | Stop reason: HOSPADM

## 2022-01-01 RX ORDER — INSULIN LISPRO 100 [IU]/ML
INJECTION, SOLUTION INTRAVENOUS; SUBCUTANEOUS
Status: DISCONTINUED | OUTPATIENT
Start: 2022-01-01 | End: 2022-01-01

## 2022-01-01 RX ORDER — DIPHENHYDRAMINE HYDROCHLORIDE 50 MG/ML
12.5 INJECTION, SOLUTION INTRAMUSCULAR; INTRAVENOUS AS NEEDED
Status: CANCELLED | OUTPATIENT
Start: 2022-01-01 | End: 2022-01-01

## 2022-01-01 RX ORDER — FAMOTIDINE 20 MG/1
20 TABLET, FILM COATED ORAL 2 TIMES DAILY
Status: DISCONTINUED | OUTPATIENT
Start: 2022-01-01 | End: 2022-01-01 | Stop reason: HOSPADM

## 2022-01-01 RX ORDER — METOPROLOL SUCCINATE 50 MG/1
50 TABLET, EXTENDED RELEASE ORAL 2 TIMES DAILY
Status: DISCONTINUED | OUTPATIENT
Start: 2022-01-01 | End: 2022-01-01 | Stop reason: HOSPADM

## 2022-01-01 RX ORDER — NOREPINEPHRINE BIT/0.9 % NACL 8 MG/250ML
.5-16 INFUSION BOTTLE (ML) INTRAVENOUS
Status: DISCONTINUED | OUTPATIENT
Start: 2022-01-01 | End: 2022-01-01

## 2022-01-01 RX ORDER — LIDOCAINE HYDROCHLORIDE 10 MG/ML
0.1 INJECTION, SOLUTION EPIDURAL; INFILTRATION; INTRACAUDAL; PERINEURAL AS NEEDED
Status: CANCELLED | OUTPATIENT
Start: 2022-01-01

## 2022-01-01 RX ORDER — MORPHINE SULFATE 2 MG/ML
2 INJECTION, SOLUTION INTRAMUSCULAR; INTRAVENOUS
Status: CANCELLED | OUTPATIENT
Start: 2022-01-01

## 2022-01-01 RX ORDER — SODIUM CHLORIDE 9 MG/ML
250 INJECTION, SOLUTION INTRAVENOUS AS NEEDED
Status: DISCONTINUED | OUTPATIENT
Start: 2022-01-01 | End: 2022-01-01 | Stop reason: SDUPTHER

## 2022-01-01 RX ORDER — ALBUMIN HUMAN 250 G/1000ML
25 SOLUTION INTRAVENOUS EVERY 6 HOURS
Status: COMPLETED | OUTPATIENT
Start: 2022-01-01 | End: 2022-01-01

## 2022-01-01 RX ORDER — INSULIN LISPRO 100 [IU]/ML
INJECTION, SOLUTION INTRAVENOUS; SUBCUTANEOUS EVERY 6 HOURS
Status: DISCONTINUED | OUTPATIENT
Start: 2022-01-01 | End: 2022-01-01

## 2022-01-01 RX ORDER — LOPERAMIDE HYDROCHLORIDE 2 MG/1
2 CAPSULE ORAL
Qty: 10 CAPSULE | Refills: 0 | Status: ON HOLD | OUTPATIENT
Start: 2022-01-01 | End: 2022-01-01

## 2022-01-01 RX ORDER — ACETAMINOPHEN 325 MG/1
650 TABLET ORAL
Status: DISCONTINUED | OUTPATIENT
Start: 2022-01-01 | End: 2022-01-01 | Stop reason: HOSPADM

## 2022-01-01 RX ORDER — SODIUM CHLORIDE 9 MG/ML
250 INJECTION, SOLUTION INTRAVENOUS AS NEEDED
Status: DISCONTINUED | OUTPATIENT
Start: 2022-01-01 | End: 2022-01-01 | Stop reason: HOSPADM

## 2022-01-01 RX ORDER — LIDOCAINE HYDROCHLORIDE 20 MG/ML
INJECTION, SOLUTION EPIDURAL; INFILTRATION; INTRACAUDAL; PERINEURAL AS NEEDED
Status: DISCONTINUED | OUTPATIENT
Start: 2022-01-01 | End: 2022-01-01 | Stop reason: HOSPADM

## 2022-01-01 RX ORDER — SODIUM CHLORIDE 0.9 % (FLUSH) 0.9 %
5-40 SYRINGE (ML) INJECTION AS NEEDED
Status: DISCONTINUED | OUTPATIENT
Start: 2022-01-01 | End: 2022-01-01 | Stop reason: HOSPADM

## 2022-01-01 RX ORDER — DEXAMETHASONE SODIUM PHOSPHATE 4 MG/ML
INJECTION, SOLUTION INTRA-ARTICULAR; INTRALESIONAL; INTRAMUSCULAR; INTRAVENOUS; SOFT TISSUE AS NEEDED
Status: DISCONTINUED | OUTPATIENT
Start: 2022-01-01 | End: 2022-01-01 | Stop reason: HOSPADM

## 2022-01-01 RX ORDER — INSULIN LISPRO 100 [IU]/ML
15 INJECTION, SOLUTION INTRAVENOUS; SUBCUTANEOUS ONCE
Status: COMPLETED | OUTPATIENT
Start: 2022-01-01 | End: 2022-01-01

## 2022-01-01 RX ORDER — INSULIN LISPRO 100 [IU]/ML
INJECTION, SOLUTION INTRAVENOUS; SUBCUTANEOUS EVERY 6 HOURS
Status: DISCONTINUED | OUTPATIENT
Start: 2022-01-01 | End: 2022-01-01 | Stop reason: HOSPADM

## 2022-01-01 RX ORDER — HYDROMORPHONE HYDROCHLORIDE 1 MG/ML
1 INJECTION, SOLUTION INTRAMUSCULAR; INTRAVENOUS; SUBCUTANEOUS EVERY 4 HOURS
Status: DISCONTINUED | OUTPATIENT
Start: 2022-01-01 | End: 2022-01-01

## 2022-01-01 RX ORDER — BRIMONIDINE TARTRATE 2 MG/ML
1 SOLUTION/ DROPS OPHTHALMIC 3 TIMES DAILY
Status: DISCONTINUED | OUTPATIENT
Start: 2022-01-01 | End: 2022-01-01 | Stop reason: HOSPADM

## 2022-01-01 RX ORDER — POTASSIUM CHLORIDE 750 MG/1
40 TABLET, FILM COATED, EXTENDED RELEASE ORAL
Status: DISCONTINUED | OUTPATIENT
Start: 2022-01-01 | End: 2022-01-01

## 2022-01-01 RX ORDER — CHOLESTYRAMINE 4 G/4.8G
4 POWDER, FOR SUSPENSION ORAL 2 TIMES DAILY WITH MEALS
Status: DISCONTINUED | OUTPATIENT
Start: 2022-01-01 | End: 2022-01-01 | Stop reason: HOSPADM

## 2022-01-01 RX ORDER — ASPIRIN 81 MG/1
81 TABLET ORAL DAILY
Status: DISCONTINUED | OUTPATIENT
Start: 2022-01-01 | End: 2022-01-01 | Stop reason: HOSPADM

## 2022-01-01 RX ORDER — ALBUTEROL SULFATE 2.5 MG/.5ML
10 SOLUTION RESPIRATORY (INHALATION)
Status: COMPLETED | OUTPATIENT
Start: 2022-01-01 | End: 2022-01-01

## 2022-01-01 RX ORDER — LABETALOL HCL 20 MG/4 ML
5 SYRINGE (ML) INTRAVENOUS
Status: CANCELLED | OUTPATIENT
Start: 2022-01-01

## 2022-01-01 RX ORDER — FLUCONAZOLE 100 MG/1
200 TABLET ORAL DAILY
Status: DISCONTINUED | OUTPATIENT
Start: 2022-01-01 | End: 2022-01-01

## 2022-01-01 RX ORDER — PROPOFOL 10 MG/ML
INJECTION, EMULSION INTRAVENOUS
Status: COMPLETED
Start: 2022-01-01 | End: 2022-01-01

## 2022-01-01 RX ORDER — LIDOCAINE HYDROCHLORIDE 10 MG/ML
INJECTION INFILTRATION; PERINEURAL AS NEEDED
Status: DISCONTINUED | OUTPATIENT
Start: 2022-01-01 | End: 2022-01-01 | Stop reason: HOSPADM

## 2022-01-01 RX ORDER — LEVETIRACETAM 10 MG/ML
1000 INJECTION INTRAVASCULAR ONCE
Status: COMPLETED | OUTPATIENT
Start: 2022-01-01 | End: 2022-01-01

## 2022-01-01 RX ORDER — SODIUM CHLORIDE 0.9 % (FLUSH) 0.9 %
5-40 SYRINGE (ML) INJECTION AS NEEDED
Status: CANCELLED | OUTPATIENT
Start: 2022-01-01

## 2022-01-01 RX ORDER — DEXTROSE MONOHYDRATE 50 MG/ML
80 INJECTION, SOLUTION INTRAVENOUS CONTINUOUS
Status: DISCONTINUED | OUTPATIENT
Start: 2022-01-01 | End: 2022-01-01

## 2022-01-01 RX ORDER — INSULIN LISPRO 100 [IU]/ML
INJECTION, SOLUTION INTRAVENOUS; SUBCUTANEOUS EVERY 4 HOURS
Status: DISCONTINUED | OUTPATIENT
Start: 2022-01-01 | End: 2022-01-01

## 2022-01-01 RX ORDER — INSULIN GLARGINE 100 [IU]/ML
25 INJECTION, SOLUTION SUBCUTANEOUS 2 TIMES DAILY
Status: DISCONTINUED | OUTPATIENT
Start: 2022-01-01 | End: 2022-01-01

## 2022-01-01 RX ORDER — IPRATROPIUM BROMIDE AND ALBUTEROL SULFATE 2.5; .5 MG/3ML; MG/3ML
3 SOLUTION RESPIRATORY (INHALATION)
Status: DISCONTINUED | OUTPATIENT
Start: 2022-01-01 | End: 2022-01-01 | Stop reason: HOSPADM

## 2022-01-01 RX ORDER — HYDRALAZINE HYDROCHLORIDE 20 MG/ML
10 INJECTION INTRAMUSCULAR; INTRAVENOUS
Status: DISCONTINUED | OUTPATIENT
Start: 2022-01-01 | End: 2022-01-01

## 2022-01-01 RX ORDER — CHOLESTYRAMINE 4 G/4.8G
4 POWDER, FOR SUSPENSION ORAL 2 TIMES DAILY WITH MEALS
Qty: 60 EACH | Refills: 0 | Status: SHIPPED | OUTPATIENT
Start: 2022-01-01 | End: 2022-06-27

## 2022-01-01 RX ORDER — POTASSIUM CHLORIDE 20 MEQ/1
40 TABLET, EXTENDED RELEASE ORAL ONCE
Status: ACTIVE | OUTPATIENT
Start: 2022-01-01 | End: 2022-01-01

## 2022-01-01 RX ORDER — PHYTONADIONE 10 MG/ML
5 INJECTION, EMULSION INTRAMUSCULAR; INTRAVENOUS; SUBCUTANEOUS
Status: COMPLETED | OUTPATIENT
Start: 2022-01-01 | End: 2022-01-01

## 2022-01-01 RX ORDER — HYDROMORPHONE HYDROCHLORIDE 1 MG/ML
0.4 INJECTION, SOLUTION INTRAMUSCULAR; INTRAVENOUS; SUBCUTANEOUS
Status: DISCONTINUED | OUTPATIENT
Start: 2022-01-01 | End: 2022-01-01 | Stop reason: HOSPADM

## 2022-01-01 RX ORDER — HYDROMORPHONE HYDROCHLORIDE 2 MG/ML
2 INJECTION, SOLUTION INTRAMUSCULAR; INTRAVENOUS; SUBCUTANEOUS EVERY 4 HOURS
Status: DISCONTINUED | OUTPATIENT
Start: 2022-01-01 | End: 2022-01-01

## 2022-01-01 RX ORDER — ONDANSETRON 4 MG/1
4 TABLET, ORALLY DISINTEGRATING ORAL
Status: CANCELLED | OUTPATIENT
Start: 2022-01-01

## 2022-01-01 RX ORDER — FACIAL-BODY WIPES
10 EACH TOPICAL DAILY PRN
Status: DISCONTINUED | OUTPATIENT
Start: 2022-01-01 | End: 2022-06-27 | Stop reason: HOSPADM

## 2022-01-01 RX ORDER — INSULIN GLARGINE 100 [IU]/ML
50 INJECTION, SOLUTION SUBCUTANEOUS DAILY
Status: DISCONTINUED | OUTPATIENT
Start: 2022-01-01 | End: 2022-01-01 | Stop reason: HOSPADM

## 2022-01-01 RX ORDER — AMLODIPINE BESYLATE 5 MG/1
5 TABLET ORAL DAILY
Qty: 30 TABLET | Refills: 0 | Status: SHIPPED | OUTPATIENT
Start: 2022-01-01 | End: 2022-06-27

## 2022-01-01 RX ORDER — GABAPENTIN 300 MG/1
300 CAPSULE ORAL
Status: DISCONTINUED | OUTPATIENT
Start: 2022-01-01 | End: 2022-01-01

## 2022-01-01 RX ORDER — GUAIFENESIN 100 MG/5ML
81 LIQUID (ML) ORAL DAILY
Status: DISCONTINUED | OUTPATIENT
Start: 2022-01-01 | End: 2022-01-01

## 2022-01-01 RX ORDER — GLYCOPYRROLATE 0.2 MG/ML
0.4 INJECTION INTRAMUSCULAR; INTRAVENOUS EVERY 4 HOURS
Status: DISCONTINUED | OUTPATIENT
Start: 2022-01-01 | End: 2022-06-27 | Stop reason: HOSPADM

## 2022-01-01 RX ORDER — INSULIN LISPRO 100 [IU]/ML
12 INJECTION, SOLUTION INTRAVENOUS; SUBCUTANEOUS ONCE
Status: COMPLETED | OUTPATIENT
Start: 2022-01-01 | End: 2022-01-01

## 2022-01-01 RX ORDER — LABETALOL HCL 20 MG/4 ML
10 SYRINGE (ML) INTRAVENOUS
Status: DISCONTINUED | OUTPATIENT
Start: 2022-01-01 | End: 2022-01-01

## 2022-01-01 RX ORDER — HYDRALAZINE HYDROCHLORIDE 20 MG/ML
20 INJECTION INTRAMUSCULAR; INTRAVENOUS
Status: DISCONTINUED | OUTPATIENT
Start: 2022-01-01 | End: 2022-01-01 | Stop reason: HOSPADM

## 2022-01-01 RX ORDER — ENOXAPARIN SODIUM 100 MG/ML
40 INJECTION SUBCUTANEOUS DAILY
Status: DISCONTINUED | OUTPATIENT
Start: 2022-01-01 | End: 2022-01-01 | Stop reason: HOSPADM

## 2022-01-01 RX ORDER — LOPERAMIDE HYDROCHLORIDE 2 MG/1
2 CAPSULE ORAL
Qty: 20 CAPSULE | Refills: 0 | Status: SHIPPED | OUTPATIENT
Start: 2022-01-01 | End: 2022-01-01

## 2022-01-01 RX ORDER — METOPROLOL SUCCINATE 50 MG/1
50 TABLET, EXTENDED RELEASE ORAL DAILY
Status: DISCONTINUED | OUTPATIENT
Start: 2022-01-01 | End: 2022-01-01

## 2022-01-01 RX ORDER — TRAMADOL HYDROCHLORIDE 50 MG/1
50 TABLET ORAL
Status: COMPLETED | OUTPATIENT
Start: 2022-01-01 | End: 2022-01-01

## 2022-01-01 RX ORDER — PHYTONADIONE 5 MG/1
10 TABLET ORAL DAILY
Status: COMPLETED | OUTPATIENT
Start: 2022-01-01 | End: 2022-01-01

## 2022-01-01 RX ORDER — LABETALOL HCL 20 MG/4 ML
20 SYRINGE (ML) INTRAVENOUS
Status: DISCONTINUED | OUTPATIENT
Start: 2022-01-01 | End: 2022-01-01 | Stop reason: HOSPADM

## 2022-01-01 RX ORDER — ONDANSETRON 2 MG/ML
4 INJECTION INTRAMUSCULAR; INTRAVENOUS AS NEEDED
Status: DISCONTINUED | OUTPATIENT
Start: 2022-01-01 | End: 2022-01-01 | Stop reason: HOSPADM

## 2022-01-01 RX ORDER — AMLODIPINE BESYLATE 5 MG/1
5 TABLET ORAL 2 TIMES DAILY
Status: DISCONTINUED | OUTPATIENT
Start: 2022-01-01 | End: 2022-01-01 | Stop reason: HOSPADM

## 2022-01-01 RX ORDER — FLUCYTOSINE 500 MG/1
500 CAPSULE ORAL EVERY 6 HOURS
Status: DISCONTINUED | OUTPATIENT
Start: 2022-01-01 | End: 2022-01-01

## 2022-01-01 RX ORDER — NORETHINDRONE AND ETHINYL ESTRADIOL 0.5-0.035
KIT ORAL AS NEEDED
Status: DISCONTINUED | OUTPATIENT
Start: 2022-01-01 | End: 2022-01-01 | Stop reason: HOSPADM

## 2022-01-01 RX ORDER — DEXTROSE MONOHYDRATE 50 MG/ML
100 INJECTION, SOLUTION INTRAVENOUS CONTINUOUS
Status: DISCONTINUED | OUTPATIENT
Start: 2022-01-01 | End: 2022-01-01

## 2022-01-01 RX ORDER — GABAPENTIN 100 MG/1
100 CAPSULE ORAL DAILY
Status: DISCONTINUED | OUTPATIENT
Start: 2022-01-01 | End: 2022-01-01

## 2022-01-01 RX ORDER — NOREPINEPHRINE BITARTRATE/D5W 4MG/250ML
.5-16 PLASTIC BAG, INJECTION (ML) INTRAVENOUS
Status: DISCONTINUED | OUTPATIENT
Start: 2022-01-01 | End: 2022-01-01 | Stop reason: CLARIF

## 2022-01-01 RX ORDER — SODIUM BICARBONATE 1 MEQ/ML
50 SYRINGE (ML) INTRAVENOUS ONCE
Status: COMPLETED | OUTPATIENT
Start: 2022-01-01 | End: 2022-01-01

## 2022-01-01 RX ORDER — IPRATROPIUM BROMIDE AND ALBUTEROL SULFATE 2.5; .5 MG/3ML; MG/3ML
3 SOLUTION RESPIRATORY (INHALATION)
Status: DISCONTINUED | OUTPATIENT
Start: 2022-01-01 | End: 2022-01-01

## 2022-01-01 RX ORDER — HEPARIN SODIUM 5000 [USP'U]/ML
5000 INJECTION, SOLUTION INTRAVENOUS; SUBCUTANEOUS EVERY 8 HOURS
Status: DISCONTINUED | OUTPATIENT
Start: 2022-01-01 | End: 2022-01-01

## 2022-01-01 RX ORDER — SODIUM CHLORIDE 0.9 % (FLUSH) 0.9 %
5-40 SYRINGE (ML) INJECTION EVERY 8 HOURS
Status: CANCELLED | OUTPATIENT
Start: 2022-01-01

## 2022-01-01 RX ORDER — FENOFIBRATE 48 MG/1
48 TABLET, COATED ORAL DAILY
Status: DISCONTINUED | OUTPATIENT
Start: 2022-01-01 | End: 2022-01-01 | Stop reason: HOSPADM

## 2022-01-01 RX ORDER — HYDRALAZINE HYDROCHLORIDE 50 MG/1
50 TABLET, FILM COATED ORAL 3 TIMES DAILY
Qty: 60 TABLET | Refills: 0 | Status: SHIPPED | OUTPATIENT
Start: 2022-01-01 | End: 2022-06-27

## 2022-01-01 RX ORDER — FACIAL-BODY WIPES
10 EACH TOPICAL
Qty: 30 SUPPOSITORY | Refills: 0 | Status: SHIPPED | OUTPATIENT
Start: 2022-01-01

## 2022-01-01 RX ORDER — LORAZEPAM 2 MG/ML
INJECTION INTRAMUSCULAR
Status: DISPENSED
Start: 2022-01-01 | End: 2022-01-01

## 2022-01-01 RX ORDER — ONDANSETRON 2 MG/ML
4 INJECTION INTRAMUSCULAR; INTRAVENOUS AS NEEDED
Status: CANCELLED | OUTPATIENT
Start: 2022-01-01

## 2022-01-01 RX ORDER — HEPARIN SODIUM 5000 [USP'U]/ML
5000 INJECTION, SOLUTION INTRAVENOUS; SUBCUTANEOUS EVERY 8 HOURS
Status: DISCONTINUED | OUTPATIENT
Start: 2022-01-01 | End: 2022-01-01 | Stop reason: HOSPADM

## 2022-01-01 RX ORDER — SODIUM CHLORIDE 0.9 % (FLUSH) 0.9 %
5-40 SYRINGE (ML) INJECTION AS NEEDED
Status: DISCONTINUED | OUTPATIENT
Start: 2022-01-01 | End: 2022-01-01

## 2022-01-01 RX ORDER — HYDROMORPHONE HYDROCHLORIDE 1 MG/ML
1 INJECTION, SOLUTION INTRAMUSCULAR; INTRAVENOUS; SUBCUTANEOUS
Status: DISCONTINUED | OUTPATIENT
Start: 2022-01-01 | End: 2022-01-01

## 2022-01-01 RX ORDER — LIDOCAINE HYDROCHLORIDE 40 MG/ML
1 SOLUTION TOPICAL AS NEEDED
Qty: 1 EACH | Refills: 0 | Status: SHIPPED | OUTPATIENT
Start: 2022-01-01 | End: 2022-01-01

## 2022-01-01 RX ORDER — FUROSEMIDE 10 MG/ML
40 INJECTION INTRAMUSCULAR; INTRAVENOUS DAILY
Status: DISCONTINUED | OUTPATIENT
Start: 2022-01-01 | End: 2022-01-01 | Stop reason: HOSPADM

## 2022-01-01 RX ORDER — ONDANSETRON 2 MG/ML
4 INJECTION INTRAMUSCULAR; INTRAVENOUS
Status: DISCONTINUED | OUTPATIENT
Start: 2022-01-01 | End: 2022-01-01 | Stop reason: HOSPADM

## 2022-01-01 RX ORDER — FERROUS SULFATE 300 MG/5ML
300 LIQUID (ML) ORAL 2 TIMES DAILY WITH MEALS
Status: DISCONTINUED | OUTPATIENT
Start: 2022-01-01 | End: 2022-01-01

## 2022-01-01 RX ORDER — LANOLIN ALCOHOL/MO/W.PET/CERES
325 CREAM (GRAM) TOPICAL 2 TIMES DAILY
Status: DISCONTINUED | OUTPATIENT
Start: 2022-01-01 | End: 2022-01-01

## 2022-01-01 RX ORDER — SCOLOPAMINE TRANSDERMAL SYSTEM 1 MG/1
1 PATCH, EXTENDED RELEASE TRANSDERMAL
Status: DISCONTINUED | OUTPATIENT
Start: 2022-01-01 | End: 2022-06-27 | Stop reason: HOSPADM

## 2022-01-01 RX ORDER — LATANOPROST 50 UG/ML
1 SOLUTION/ DROPS OPHTHALMIC EVERY EVENING
Status: DISCONTINUED | OUTPATIENT
Start: 2022-01-01 | End: 2022-01-01 | Stop reason: HOSPADM

## 2022-01-01 RX ORDER — ENOXAPARIN SODIUM 100 MG/ML
40 INJECTION SUBCUTANEOUS DAILY
Status: DISCONTINUED | OUTPATIENT
Start: 2022-01-01 | End: 2022-01-01 | Stop reason: SDUPTHER

## 2022-01-01 RX ORDER — HYDROMORPHONE HYDROCHLORIDE 2 MG/ML
2 INJECTION, SOLUTION INTRAMUSCULAR; INTRAVENOUS; SUBCUTANEOUS
Status: DISCONTINUED | OUTPATIENT
Start: 2022-01-01 | End: 2022-01-01

## 2022-01-01 RX ORDER — SODIUM CHLORIDE, SODIUM LACTATE, POTASSIUM CHLORIDE, CALCIUM CHLORIDE 600; 310; 30; 20 MG/100ML; MG/100ML; MG/100ML; MG/100ML
INJECTION, SOLUTION INTRAVENOUS
Status: DISCONTINUED | OUTPATIENT
Start: 2022-01-01 | End: 2022-01-01 | Stop reason: HOSPADM

## 2022-01-01 RX ORDER — HYDROMORPHONE HYDROCHLORIDE 2 MG/ML
2 INJECTION, SOLUTION INTRAMUSCULAR; INTRAVENOUS; SUBCUTANEOUS
Status: DISCONTINUED | OUTPATIENT
Start: 2022-01-01 | End: 2022-06-27 | Stop reason: HOSPADM

## 2022-01-01 RX ORDER — FENTANYL CITRATE 50 UG/ML
50 INJECTION, SOLUTION INTRAMUSCULAR; INTRAVENOUS AS NEEDED
Status: CANCELLED | OUTPATIENT
Start: 2022-01-01

## 2022-01-01 RX ORDER — INSULIN GLARGINE 100 [IU]/ML
50 INJECTION, SOLUTION SUBCUTANEOUS DAILY
Qty: 10 ML | Refills: 0 | Status: SHIPPED | OUTPATIENT
Start: 2022-01-01 | End: 2022-06-27

## 2022-01-01 RX ORDER — FUROSEMIDE 10 MG/ML
40 INJECTION INTRAMUSCULAR; INTRAVENOUS ONCE
Status: COMPLETED | OUTPATIENT
Start: 2022-01-01 | End: 2022-01-01

## 2022-01-01 RX ORDER — POTASSIUM CHLORIDE 20 MEQ/1
40 TABLET, EXTENDED RELEASE ORAL
Status: DISCONTINUED | OUTPATIENT
Start: 2022-01-01 | End: 2022-01-01

## 2022-01-01 RX ORDER — DEXTROSE MONOHYDRATE 100 MG/ML
0-250 INJECTION, SOLUTION INTRAVENOUS AS NEEDED
Status: DISCONTINUED | OUTPATIENT
Start: 2022-01-01 | End: 2022-01-01 | Stop reason: SDUPTHER

## 2022-01-01 RX ORDER — SODIUM CHLORIDE 9 MG/ML
75 INJECTION, SOLUTION INTRAVENOUS CONTINUOUS
Status: DISCONTINUED | OUTPATIENT
Start: 2022-01-01 | End: 2022-01-01 | Stop reason: DRUGHIGH

## 2022-01-01 RX ORDER — METOPROLOL SUCCINATE 50 MG/1
50 TABLET, EXTENDED RELEASE ORAL 2 TIMES DAILY
Qty: 60 TABLET | Refills: 0 | Status: SHIPPED | OUTPATIENT
Start: 2022-01-01 | End: 2022-01-01

## 2022-01-01 RX ORDER — HYDROMORPHONE HYDROCHLORIDE 8 MG/1
8 TABLET ORAL EVERY 4 HOURS
Qty: 12 TABLET | Refills: 0 | Status: SHIPPED | OUTPATIENT
Start: 2022-01-01 | End: 2022-06-29

## 2022-01-01 RX ORDER — CALCIUM POLYCARBOPHIL 625 MG
1 TABLET ORAL DAILY
Status: DISCONTINUED | OUTPATIENT
Start: 2022-01-01 | End: 2022-01-01 | Stop reason: HOSPADM

## 2022-01-01 RX ORDER — KETOROLAC TROMETHAMINE 30 MG/ML
30 INJECTION, SOLUTION INTRAMUSCULAR; INTRAVENOUS
Status: ACTIVE | OUTPATIENT
Start: 2022-01-01 | End: 2022-01-01

## 2022-01-01 RX ORDER — SODIUM CHLORIDE, SODIUM LACTATE, POTASSIUM CHLORIDE, CALCIUM CHLORIDE 600; 310; 30; 20 MG/100ML; MG/100ML; MG/100ML; MG/100ML
INJECTION, SOLUTION INTRAVENOUS
Status: DISCONTINUED | OUTPATIENT
Start: 2022-01-01 | End: 2022-01-01

## 2022-01-01 RX ORDER — GUAIFENESIN 100 MG/5ML
81 LIQUID (ML) ORAL DAILY
Status: DISCONTINUED | OUTPATIENT
Start: 2022-01-01 | End: 2022-01-01 | Stop reason: HOSPADM

## 2022-01-01 RX ORDER — INSULIN GLARGINE 100 [IU]/ML
22 INJECTION, SOLUTION SUBCUTANEOUS DAILY
Status: DISCONTINUED | OUTPATIENT
Start: 2022-01-01 | End: 2022-01-01

## 2022-01-01 RX ORDER — METOPROLOL TARTRATE 5 MG/5ML
2.5 INJECTION INTRAVENOUS
Status: DISCONTINUED | OUTPATIENT
Start: 2022-01-01 | End: 2022-01-01 | Stop reason: HOSPADM

## 2022-01-01 RX ORDER — CALCIUM CHLORIDE INJECTION 100 MG/ML
INJECTION, SOLUTION INTRAVENOUS AS NEEDED
Status: DISCONTINUED | OUTPATIENT
Start: 2022-01-01 | End: 2022-01-01 | Stop reason: HOSPADM

## 2022-01-01 RX ORDER — POTASSIUM CHLORIDE 1.5 G/1.77G
40 POWDER, FOR SOLUTION ORAL
Status: COMPLETED | OUTPATIENT
Start: 2022-01-01 | End: 2022-01-01

## 2022-01-01 RX ORDER — PROPOFOL 10 MG/ML
INJECTION, EMULSION INTRAVENOUS AS NEEDED
Status: DISCONTINUED | OUTPATIENT
Start: 2022-01-01 | End: 2022-01-01 | Stop reason: HOSPADM

## 2022-01-01 RX ORDER — FLUCONAZOLE 2 MG/ML
200 INJECTION, SOLUTION INTRAVENOUS EVERY 24 HOURS
Status: DISCONTINUED | OUTPATIENT
Start: 2022-01-01 | End: 2022-01-01

## 2022-01-01 RX ORDER — LOPERAMIDE HYDROCHLORIDE 2 MG/1
4 CAPSULE ORAL ONCE
Status: COMPLETED | OUTPATIENT
Start: 2022-01-01 | End: 2022-01-01

## 2022-01-01 RX ORDER — INSULIN GLARGINE 100 [IU]/ML
25 INJECTION, SOLUTION SUBCUTANEOUS
Status: DISCONTINUED | OUTPATIENT
Start: 2022-01-01 | End: 2022-01-01

## 2022-01-01 RX ORDER — GLYCOPYRROLATE 0.2 MG/ML
0.1 INJECTION INTRAMUSCULAR; INTRAVENOUS 3 TIMES DAILY
Status: DISCONTINUED | OUTPATIENT
Start: 2022-01-01 | End: 2022-01-01

## 2022-01-01 RX ORDER — MIDAZOLAM HYDROCHLORIDE 1 MG/ML
1 INJECTION, SOLUTION INTRAMUSCULAR; INTRAVENOUS AS NEEDED
Status: CANCELLED | OUTPATIENT
Start: 2022-01-01

## 2022-01-01 RX ORDER — ASPIRIN 81 MG/1
81 TABLET ORAL DAILY
Status: DISCONTINUED | OUTPATIENT
Start: 2022-01-01 | End: 2022-01-01

## 2022-01-01 RX ORDER — PHYTONADIONE 10 MG/ML
10 INJECTION, EMULSION INTRAMUSCULAR; INTRAVENOUS; SUBCUTANEOUS DAILY
Status: DISCONTINUED | OUTPATIENT
Start: 2022-01-01 | End: 2022-01-01

## 2022-01-01 RX ORDER — MAGNESIUM SULFATE 100 %
4 CRYSTALS MISCELLANEOUS AS NEEDED
Status: DISCONTINUED | OUTPATIENT
Start: 2022-01-01 | End: 2022-01-01 | Stop reason: HOSPADM

## 2022-01-01 RX ORDER — ATORVASTATIN CALCIUM 40 MG/1
80 TABLET, FILM COATED ORAL DAILY
Status: DISCONTINUED | OUTPATIENT
Start: 2022-01-01 | End: 2022-01-01 | Stop reason: HOSPADM

## 2022-01-01 RX ORDER — SODIUM CHLORIDE 9 MG/ML
50 INJECTION, SOLUTION INTRAVENOUS CONTINUOUS
Status: DISCONTINUED | OUTPATIENT
Start: 2022-01-01 | End: 2022-01-01

## 2022-01-01 RX ORDER — METOPROLOL TARTRATE 5 MG/5ML
2.5 INJECTION INTRAVENOUS
Status: CANCELLED | OUTPATIENT
Start: 2022-01-01

## 2022-01-01 RX ORDER — LOPERAMIDE HYDROCHLORIDE 2 MG/1
2 CAPSULE ORAL ONCE
Status: COMPLETED | OUTPATIENT
Start: 2022-01-01 | End: 2022-01-01

## 2022-01-01 RX ORDER — FLUCYTOSINE 500 MG/1
500 CAPSULE ORAL EVERY 6 HOURS
Status: DISCONTINUED | OUTPATIENT
Start: 2022-01-01 | End: 2022-01-01 | Stop reason: HOSPADM

## 2022-01-01 RX ORDER — POLYETHYLENE GLYCOL 3350 17 G/17G
17 POWDER, FOR SOLUTION ORAL DAILY PRN
Status: DISCONTINUED | OUTPATIENT
Start: 2022-01-01 | End: 2022-01-01 | Stop reason: HOSPADM

## 2022-01-01 RX ORDER — FLUCONAZOLE 100 MG/1
200 TABLET ORAL DAILY
Qty: 11 TABLET | Refills: 0 | Status: SHIPPED | OUTPATIENT
Start: 2022-01-01 | End: 2022-01-01 | Stop reason: SDUPTHER

## 2022-01-01 RX ORDER — LORAZEPAM 1 MG/1
1 TABLET ORAL ONCE
Status: COMPLETED | OUTPATIENT
Start: 2022-01-01 | End: 2022-01-01

## 2022-01-01 RX ORDER — CALCIUM GLUCONATE 94 MG/ML
1 INJECTION, SOLUTION INTRAVENOUS
Status: COMPLETED | OUTPATIENT
Start: 2022-01-01 | End: 2022-01-01

## 2022-01-01 RX ORDER — DEXTROSE MONOHYDRATE 100 MG/ML
0-250 INJECTION, SOLUTION INTRAVENOUS AS NEEDED
Status: DISCONTINUED | OUTPATIENT
Start: 2022-01-01 | End: 2022-01-01 | Stop reason: HOSPADM

## 2022-01-01 RX ORDER — ONDANSETRON 2 MG/ML
4 INJECTION INTRAMUSCULAR; INTRAVENOUS
Status: CANCELLED | OUTPATIENT
Start: 2022-01-01

## 2022-01-01 RX ORDER — FENTANYL CITRATE 50 UG/ML
INJECTION, SOLUTION INTRAMUSCULAR; INTRAVENOUS AS NEEDED
Status: DISCONTINUED | OUTPATIENT
Start: 2022-01-01 | End: 2022-01-01 | Stop reason: HOSPADM

## 2022-01-01 RX ORDER — SCOLOPAMINE TRANSDERMAL SYSTEM 1 MG/1
1 PATCH, EXTENDED RELEASE TRANSDERMAL
Qty: 10 PATCH | Refills: 0 | Status: SHIPPED | OUTPATIENT
Start: 2022-06-28

## 2022-01-01 RX ORDER — SCOLOPAMINE TRANSDERMAL SYSTEM 1 MG/1
1 PATCH, EXTENDED RELEASE TRANSDERMAL
Status: DISCONTINUED | OUTPATIENT
Start: 2022-01-01 | End: 2022-01-01 | Stop reason: HOSPADM

## 2022-01-01 RX ORDER — SODIUM POLYSTYRENE SULFONATE 15 G/60ML
30 SUSPENSION ORAL; RECTAL
Status: COMPLETED | OUTPATIENT
Start: 2022-01-01 | End: 2022-01-01

## 2022-01-01 RX ORDER — ROCURONIUM BROMIDE 10 MG/ML
INJECTION, SOLUTION INTRAVENOUS AS NEEDED
Status: DISCONTINUED | OUTPATIENT
Start: 2022-01-01 | End: 2022-01-01 | Stop reason: HOSPADM

## 2022-01-01 RX ORDER — CIPROFLOXACIN 500 MG/1
750 TABLET ORAL EVERY 12 HOURS
Status: DISCONTINUED | OUTPATIENT
Start: 2022-01-01 | End: 2022-01-01 | Stop reason: HOSPADM

## 2022-01-01 RX ORDER — ONDANSETRON 4 MG/1
4 TABLET, ORALLY DISINTEGRATING ORAL
Status: DISCONTINUED | OUTPATIENT
Start: 2022-01-01 | End: 2022-01-01 | Stop reason: HOSPADM

## 2022-01-01 RX ORDER — DEXTROSE MONOHYDRATE 50 MG/ML
INJECTION, SOLUTION INTRAVENOUS
Status: DISCONTINUED | OUTPATIENT
Start: 2022-01-01 | End: 2022-01-01 | Stop reason: HOSPADM

## 2022-01-01 RX ORDER — ONDANSETRON 2 MG/ML
INJECTION INTRAMUSCULAR; INTRAVENOUS AS NEEDED
Status: DISCONTINUED | OUTPATIENT
Start: 2022-01-01 | End: 2022-01-01 | Stop reason: HOSPADM

## 2022-01-01 RX ORDER — INSULIN GLARGINE 100 [IU]/ML
25 INJECTION, SOLUTION SUBCUTANEOUS ONCE
Status: COMPLETED | OUTPATIENT
Start: 2022-01-01 | End: 2022-01-01

## 2022-01-01 RX ORDER — ACETAMINOPHEN 650 MG/1
650 SUPPOSITORY RECTAL
Status: DISCONTINUED | OUTPATIENT
Start: 2022-01-01 | End: 2022-01-01 | Stop reason: HOSPADM

## 2022-01-01 RX ORDER — HYDROCODONE BITARTRATE AND ACETAMINOPHEN 5; 325 MG/1; MG/1
1 TABLET ORAL AS NEEDED
Status: DISCONTINUED | OUTPATIENT
Start: 2022-01-01 | End: 2022-01-01 | Stop reason: HOSPADM

## 2022-01-01 RX ORDER — LORAZEPAM 2 MG/ML
1 CONCENTRATE ORAL
Status: DISCONTINUED | OUTPATIENT
Start: 2022-01-01 | End: 2022-06-27 | Stop reason: HOSPADM

## 2022-01-01 RX ORDER — AMLODIPINE BESYLATE 5 MG/1
5 TABLET ORAL DAILY
Status: DISCONTINUED | OUTPATIENT
Start: 2022-01-01 | End: 2022-01-01 | Stop reason: HOSPADM

## 2022-01-01 RX ORDER — LINEZOLID 600 MG/1
600 TABLET, FILM COATED ORAL EVERY 12 HOURS
Status: DISCONTINUED | OUTPATIENT
Start: 2022-01-01 | End: 2022-01-01

## 2022-01-01 RX ORDER — GABAPENTIN 300 MG/1
300 CAPSULE ORAL 3 TIMES DAILY
Status: DISCONTINUED | OUTPATIENT
Start: 2022-01-01 | End: 2022-01-01

## 2022-01-01 RX ORDER — GLYCOPYRROLATE 0.2 MG/ML
0.1 INJECTION INTRAMUSCULAR; INTRAVENOUS EVERY 6 HOURS
Status: DISCONTINUED | OUTPATIENT
Start: 2022-01-01 | End: 2022-01-01

## 2022-01-01 RX ORDER — LORAZEPAM 2 MG/ML
1 INJECTION INTRAMUSCULAR EVERY 4 HOURS
Status: DISCONTINUED | OUTPATIENT
Start: 2022-01-01 | End: 2022-01-01

## 2022-01-01 RX ORDER — LORAZEPAM 2 MG/ML
1 CONCENTRATE ORAL EVERY 4 HOURS
Status: DISCONTINUED | OUTPATIENT
Start: 2022-01-01 | End: 2022-06-27 | Stop reason: HOSPADM

## 2022-01-01 RX ORDER — LANOLIN ALCOHOL/MO/W.PET/CERES
1 CREAM (GRAM) TOPICAL 2 TIMES DAILY
Status: DISCONTINUED | OUTPATIENT
Start: 2022-01-01 | End: 2022-01-01 | Stop reason: HOSPADM

## 2022-01-01 RX ORDER — SODIUM POLYSTYRENE SULFONATE 15 G/60ML
15 SUSPENSION ORAL; RECTAL
Status: DISPENSED | OUTPATIENT
Start: 2022-01-01 | End: 2022-01-01

## 2022-01-01 RX ORDER — POTASSIUM CHLORIDE 7.45 MG/ML
10 INJECTION INTRAVENOUS
Status: COMPLETED | OUTPATIENT
Start: 2022-01-01 | End: 2022-01-01

## 2022-01-01 RX ORDER — INSULIN LISPRO 100 [IU]/ML
INJECTION, SOLUTION INTRAVENOUS; SUBCUTANEOUS
Status: DISCONTINUED | OUTPATIENT
Start: 2022-01-01 | End: 2022-01-01 | Stop reason: HOSPADM

## 2022-01-01 RX ORDER — FLUCONAZOLE 2 MG/ML
400 INJECTION, SOLUTION INTRAVENOUS ONCE
Status: COMPLETED | OUTPATIENT
Start: 2022-01-01 | End: 2022-01-01

## 2022-01-01 RX ORDER — INSULIN GLARGINE 100 [IU]/ML
22 INJECTION, SOLUTION SUBCUTANEOUS 2 TIMES DAILY
Status: DISCONTINUED | OUTPATIENT
Start: 2022-01-01 | End: 2022-01-01

## 2022-01-01 RX ORDER — INSULIN GLARGINE 100 [IU]/ML
50 INJECTION, SOLUTION SUBCUTANEOUS
Status: DISCONTINUED | OUTPATIENT
Start: 2022-01-01 | End: 2022-01-01 | Stop reason: HOSPADM

## 2022-01-01 RX ORDER — SODIUM POLYSTYRENE SULFONATE 15 G/60ML
30 SUSPENSION ORAL; RECTAL
Status: DISCONTINUED | OUTPATIENT
Start: 2022-01-01 | End: 2022-01-01

## 2022-01-01 RX ORDER — MAGNESIUM SULFATE 100 %
4 CRYSTALS MISCELLANEOUS AS NEEDED
Status: DISCONTINUED | OUTPATIENT
Start: 2022-01-01 | End: 2022-01-01

## 2022-01-01 RX ORDER — L. ACIDOPHILUS/PECTIN, CITRUS 25MM-100MG
2 TABLET ORAL DAILY
Status: DISCONTINUED | OUTPATIENT
Start: 2022-01-01 | End: 2022-01-01 | Stop reason: HOSPADM

## 2022-01-01 RX ORDER — L. ACIDOPHILUS/PECTIN, CITRUS 25MM-100MG
2 TABLET ORAL DAILY
Qty: 30 TABLET | Refills: 0 | Status: SHIPPED | OUTPATIENT
Start: 2022-01-01 | End: 2022-06-27

## 2022-01-01 RX ORDER — LOPERAMIDE HYDROCHLORIDE 2 MG/1
2 CAPSULE ORAL
Status: DISCONTINUED | OUTPATIENT
Start: 2022-01-01 | End: 2022-01-01 | Stop reason: HOSPADM

## 2022-01-01 RX ORDER — DEXTROSE 50 % IN WATER (D50W) INTRAVENOUS SYRINGE
25-50 AS NEEDED
Status: DISCONTINUED | OUTPATIENT
Start: 2022-01-01 | End: 2022-01-01

## 2022-01-01 RX ORDER — IPRATROPIUM BROMIDE AND ALBUTEROL SULFATE 2.5; .5 MG/3ML; MG/3ML
3 SOLUTION RESPIRATORY (INHALATION)
Qty: 30 NEBULE | Refills: 1 | Status: SHIPPED | OUTPATIENT
Start: 2022-01-01 | End: 2022-06-27

## 2022-01-01 RX ORDER — HYDRALAZINE HYDROCHLORIDE 50 MG/1
50 TABLET, FILM COATED ORAL 3 TIMES DAILY
Status: DISCONTINUED | OUTPATIENT
Start: 2022-01-01 | End: 2022-01-01 | Stop reason: HOSPADM

## 2022-01-01 RX ORDER — CIPROFLOXACIN 750 MG/1
750 TABLET, FILM COATED ORAL EVERY 12 HOURS
Qty: 12 TABLET | Refills: 0 | Status: SHIPPED | OUTPATIENT
Start: 2022-01-01 | End: 2022-01-01

## 2022-01-01 RX ORDER — POTASSIUM CHLORIDE 750 MG/1
40 TABLET, FILM COATED, EXTENDED RELEASE ORAL
Status: COMPLETED | OUTPATIENT
Start: 2022-01-01 | End: 2022-01-01

## 2022-01-01 RX ORDER — ENOXAPARIN SODIUM 100 MG/ML
40 INJECTION SUBCUTANEOUS DAILY
Status: CANCELLED | OUTPATIENT
Start: 2022-01-01

## 2022-01-01 RX ORDER — TRAMADOL HYDROCHLORIDE 50 MG/1
50 TABLET ORAL
Status: DISCONTINUED | OUTPATIENT
Start: 2022-01-01 | End: 2022-01-01

## 2022-01-01 RX ORDER — POTASSIUM CHLORIDE 1.5 G/1.77G
20 POWDER, FOR SOLUTION ORAL
Status: COMPLETED | OUTPATIENT
Start: 2022-01-01 | End: 2022-01-01

## 2022-01-01 RX ORDER — GABAPENTIN 300 MG/1
300 CAPSULE ORAL 2 TIMES DAILY
Status: DISCONTINUED | OUTPATIENT
Start: 2022-01-01 | End: 2022-01-01 | Stop reason: HOSPADM

## 2022-01-01 RX ORDER — KETAMINE HYDROCHLORIDE 10 MG/ML
INJECTION, SOLUTION INTRAMUSCULAR; INTRAVENOUS AS NEEDED
Status: DISCONTINUED | OUTPATIENT
Start: 2022-01-01 | End: 2022-01-01

## 2022-01-01 RX ORDER — SCOLOPAMINE TRANSDERMAL SYSTEM 1 MG/1
1 PATCH, EXTENDED RELEASE TRANSDERMAL
Status: DISCONTINUED | OUTPATIENT
Start: 2022-01-01 | End: 2022-01-01

## 2022-01-01 RX ORDER — POTASSIUM CHLORIDE 7.45 MG/ML
10 INJECTION INTRAVENOUS ONCE
Status: COMPLETED | OUTPATIENT
Start: 2022-01-01 | End: 2022-01-01

## 2022-01-01 RX ORDER — FLUCONAZOLE 100 MG/1
200 TABLET ORAL DAILY
Qty: 11 TABLET | Refills: 0 | Status: SHIPPED | OUTPATIENT
Start: 2022-01-01 | End: 2022-01-01

## 2022-01-01 RX ORDER — HYDROMORPHONE HYDROCHLORIDE 2 MG/1
8 TABLET ORAL EVERY 4 HOURS
Status: DISCONTINUED | OUTPATIENT
Start: 2022-01-01 | End: 2022-06-27 | Stop reason: HOSPADM

## 2022-01-01 RX ORDER — LIDOCAINE HYDROCHLORIDE 40 MG/ML
SOLUTION TOPICAL AS NEEDED
Status: DISCONTINUED | OUTPATIENT
Start: 2022-01-01 | End: 2022-01-01 | Stop reason: HOSPADM

## 2022-01-01 RX ORDER — INSULIN GLARGINE 100 [IU]/ML
32 INJECTION, SOLUTION SUBCUTANEOUS 2 TIMES DAILY
Status: DISCONTINUED | OUTPATIENT
Start: 2022-01-01 | End: 2022-01-01 | Stop reason: HOSPADM

## 2022-01-01 RX ORDER — PHYTONADIONE 5 MG/1
5 TABLET ORAL
Status: COMPLETED | OUTPATIENT
Start: 2022-01-01 | End: 2022-01-01

## 2022-01-01 RX ORDER — PHYTONADIONE 1 MG/.5ML
5 INJECTION, EMULSION INTRAMUSCULAR; INTRAVENOUS; SUBCUTANEOUS
Status: DISCONTINUED | OUTPATIENT
Start: 2022-01-01 | End: 2022-01-01

## 2022-01-01 RX ORDER — SODIUM CHLORIDE 0.9 % (FLUSH) 0.9 %
5-40 SYRINGE (ML) INJECTION EVERY 8 HOURS
Status: DISCONTINUED | OUTPATIENT
Start: 2022-01-01 | End: 2022-01-01

## 2022-01-01 RX ORDER — MIDAZOLAM HYDROCHLORIDE 1 MG/ML
0.5 INJECTION, SOLUTION INTRAMUSCULAR; INTRAVENOUS
Status: CANCELLED | OUTPATIENT
Start: 2022-01-01

## 2022-01-01 RX ORDER — ONDANSETRON 4 MG/1
4 TABLET, ORALLY DISINTEGRATING ORAL
Status: DISCONTINUED | OUTPATIENT
Start: 2022-01-01 | End: 2022-01-01

## 2022-01-01 RX ORDER — DILTIAZEM HYDROCHLORIDE 120 MG/1
120 CAPSULE, COATED, EXTENDED RELEASE ORAL DAILY
Status: DISCONTINUED | OUTPATIENT
Start: 2022-01-01 | End: 2022-01-01

## 2022-01-01 RX ORDER — HYDRALAZINE HYDROCHLORIDE 20 MG/ML
10 INJECTION INTRAMUSCULAR; INTRAVENOUS
Status: CANCELLED | OUTPATIENT
Start: 2022-01-01

## 2022-01-01 RX ORDER — FERROUS SULFATE 300 MG/5ML
300 LIQUID (ML) ORAL 2 TIMES DAILY
Status: DISCONTINUED | OUTPATIENT
Start: 2022-01-01 | End: 2022-01-01 | Stop reason: HOSPADM

## 2022-01-01 RX ORDER — TRAMADOL HYDROCHLORIDE 50 MG/1
25 TABLET ORAL
COMMUNITY
End: 2022-06-27

## 2022-01-01 RX ORDER — FENTANYL CITRATE 50 UG/ML
50 INJECTION, SOLUTION INTRAMUSCULAR; INTRAVENOUS
Status: DISCONTINUED | OUTPATIENT
Start: 2022-01-01 | End: 2022-01-01 | Stop reason: HOSPADM

## 2022-01-01 RX ORDER — HYDROMORPHONE HYDROCHLORIDE 1 MG/ML
0.5 INJECTION, SOLUTION INTRAMUSCULAR; INTRAVENOUS; SUBCUTANEOUS
Status: CANCELLED | OUTPATIENT
Start: 2022-01-01

## 2022-01-01 RX ORDER — TRAMADOL HYDROCHLORIDE 50 MG/1
25 TABLET ORAL
Status: DISCONTINUED | OUTPATIENT
Start: 2022-01-01 | End: 2022-01-01 | Stop reason: HOSPADM

## 2022-01-01 RX ORDER — TRAMADOL HYDROCHLORIDE 50 MG/1
50 TABLET ORAL
Status: DISCONTINUED | OUTPATIENT
Start: 2022-01-01 | End: 2022-01-01 | Stop reason: HOSPADM

## 2022-01-01 RX ORDER — LORAZEPAM 2 MG/ML
1 INJECTION INTRAMUSCULAR
Status: DISCONTINUED | OUTPATIENT
Start: 2022-01-01 | End: 2022-01-01

## 2022-01-01 RX ORDER — ATORVASTATIN CALCIUM 20 MG/1
20 TABLET, FILM COATED ORAL
Status: DISCONTINUED | OUTPATIENT
Start: 2022-01-01 | End: 2022-01-01 | Stop reason: HOSPADM

## 2022-01-01 RX ORDER — PHYTONADIONE 5 MG/1
10 TABLET ORAL DAILY
Status: DISCONTINUED | OUTPATIENT
Start: 2022-01-01 | End: 2022-01-01 | Stop reason: DRUGHIGH

## 2022-01-01 RX ORDER — DEXTROSE MONOHYDRATE 100 MG/ML
125-250 INJECTION, SOLUTION INTRAVENOUS AS NEEDED
Status: DISCONTINUED | OUTPATIENT
Start: 2022-01-01 | End: 2022-01-01 | Stop reason: HOSPADM

## 2022-01-01 RX ORDER — INSULIN GLARGINE 100 [IU]/ML
15 INJECTION, SOLUTION SUBCUTANEOUS
Status: DISCONTINUED | OUTPATIENT
Start: 2022-01-01 | End: 2022-01-01

## 2022-01-01 RX ORDER — INSULIN LISPRO 100 [IU]/ML
10 INJECTION, SOLUTION INTRAVENOUS; SUBCUTANEOUS ONCE
Status: COMPLETED | OUTPATIENT
Start: 2022-01-01 | End: 2022-01-01

## 2022-01-01 RX ORDER — INSULIN GLARGINE 100 [IU]/ML
30 INJECTION, SOLUTION SUBCUTANEOUS
Status: DISCONTINUED | OUTPATIENT
Start: 2022-01-01 | End: 2022-01-01

## 2022-01-01 RX ORDER — FUROSEMIDE 10 MG/ML
20 INJECTION INTRAMUSCULAR; INTRAVENOUS EVERY 12 HOURS
Status: DISCONTINUED | OUTPATIENT
Start: 2022-01-01 | End: 2022-01-01

## 2022-01-01 RX ORDER — INSULIN LISPRO 100 [IU]/ML
10 INJECTION, SOLUTION INTRAVENOUS; SUBCUTANEOUS
Status: COMPLETED | OUTPATIENT
Start: 2022-01-01 | End: 2022-01-01

## 2022-01-01 RX ORDER — HYDRALAZINE HYDROCHLORIDE 25 MG/1
12.5 TABLET, FILM COATED ORAL 3 TIMES DAILY
Status: DISCONTINUED | OUTPATIENT
Start: 2022-01-01 | End: 2022-01-01 | Stop reason: HOSPADM

## 2022-01-01 RX ORDER — GLYCOPYRROLATE 2 MG/1
2 TABLET ORAL 3 TIMES DAILY
Qty: 24 TABLET | Refills: 0 | Status: SHIPPED | OUTPATIENT
Start: 2022-01-01

## 2022-01-01 RX ORDER — SODIUM CHLORIDE 450 MG/100ML
50 INJECTION, SOLUTION INTRAVENOUS CONTINUOUS
Status: DISCONTINUED | OUTPATIENT
Start: 2022-01-01 | End: 2022-01-01

## 2022-01-01 RX ORDER — LABETALOL HCL 20 MG/4 ML
5 SYRINGE (ML) INTRAVENOUS
Status: DISCONTINUED | OUTPATIENT
Start: 2022-01-01 | End: 2022-01-01 | Stop reason: HOSPADM

## 2022-01-01 RX ORDER — FENTANYL CITRATE 50 UG/ML
50 INJECTION, SOLUTION INTRAMUSCULAR; INTRAVENOUS
Status: CANCELLED | OUTPATIENT
Start: 2022-01-01

## 2022-01-01 RX ORDER — FENOFIBRATE 48 MG/1
48 TABLET, COATED ORAL
COMMUNITY
End: 2022-06-27

## 2022-01-01 RX ORDER — LATANOPROST 50 UG/ML
1 SOLUTION/ DROPS OPHTHALMIC EVERY EVENING
Qty: 10 ML | Refills: 0 | Status: SHIPPED | OUTPATIENT
Start: 2022-01-01 | End: 2022-06-27

## 2022-01-01 RX ORDER — LORAZEPAM 2 MG/ML
2 INJECTION INTRAMUSCULAR ONCE
Status: COMPLETED | OUTPATIENT
Start: 2022-01-01 | End: 2022-01-01

## 2022-01-01 RX ORDER — AMLODIPINE BESYLATE 5 MG/1
5 TABLET ORAL DAILY
Status: DISCONTINUED | OUTPATIENT
Start: 2022-01-01 | End: 2022-01-01

## 2022-01-01 RX ORDER — NORETHINDRONE AND ETHINYL ESTRADIOL 0.5-0.035
5 KIT ORAL AS NEEDED
Status: DISCONTINUED | OUTPATIENT
Start: 2022-01-01 | End: 2022-01-01 | Stop reason: HOSPADM

## 2022-01-01 RX ORDER — METOPROLOL TARTRATE 25 MG/1
12.5 TABLET, FILM COATED ORAL 2 TIMES DAILY
Status: DISCONTINUED | OUTPATIENT
Start: 2022-01-01 | End: 2022-01-01 | Stop reason: HOSPADM

## 2022-01-01 RX ORDER — LANOLIN ALCOHOL/MO/W.PET/CERES
325 CREAM (GRAM) TOPICAL 2 TIMES DAILY
Status: DISCONTINUED | OUTPATIENT
Start: 2022-01-01 | End: 2022-01-01 | Stop reason: HOSPADM

## 2022-01-01 RX ORDER — VANCOMYCIN/0.9 % SOD CHLORIDE 1.5G/250ML
1500 PLASTIC BAG, INJECTION (ML) INTRAVENOUS ONCE
Status: COMPLETED | OUTPATIENT
Start: 2022-01-01 | End: 2022-01-01

## 2022-01-01 RX ORDER — METOPROLOL TARTRATE 50 MG/1
50 TABLET ORAL 2 TIMES DAILY
Qty: 60 TABLET | Refills: 0 | Status: SHIPPED | OUTPATIENT
Start: 2022-01-01 | End: 2022-06-27

## 2022-01-01 RX ORDER — SODIUM CHLORIDE 450 MG/100ML
25 INJECTION, SOLUTION INTRAVENOUS CONTINUOUS
Status: DISCONTINUED | OUTPATIENT
Start: 2022-01-01 | End: 2022-01-01

## 2022-01-01 RX ORDER — LORAZEPAM 2 MG/ML
INJECTION INTRAMUSCULAR
Status: COMPLETED
Start: 2022-01-01 | End: 2022-01-01

## 2022-01-01 RX ORDER — LABETALOL HCL 20 MG/4 ML
10 SYRINGE (ML) INTRAVENOUS
Status: DISCONTINUED | OUTPATIENT
Start: 2022-01-01 | End: 2022-01-01 | Stop reason: HOSPADM

## 2022-01-01 RX ORDER — ACETAMINOPHEN 325 MG/1
650 TABLET ORAL
Status: DISCONTINUED | OUTPATIENT
Start: 2022-01-01 | End: 2022-01-01 | Stop reason: DRUGHIGH

## 2022-01-01 RX ORDER — EPHEDRINE SULFATE/0.9% NACL/PF 50 MG/5 ML
5 SYRINGE (ML) INTRAVENOUS AS NEEDED
Status: CANCELLED | OUTPATIENT
Start: 2022-01-01

## 2022-01-01 RX ORDER — ONDANSETRON 2 MG/ML
4 INJECTION INTRAMUSCULAR; INTRAVENOUS
Status: DISCONTINUED | OUTPATIENT
Start: 2022-01-01 | End: 2022-01-01

## 2022-01-01 RX ORDER — DEXTROSE MONOHYDRATE 50 MG/ML
30 INJECTION, SOLUTION INTRAVENOUS CONTINUOUS
Status: DISCONTINUED | OUTPATIENT
Start: 2022-01-01 | End: 2022-01-01

## 2022-01-01 RX ORDER — HYDRALAZINE HYDROCHLORIDE 25 MG/1
25 TABLET, FILM COATED ORAL 3 TIMES DAILY
Status: DISCONTINUED | OUTPATIENT
Start: 2022-01-01 | End: 2022-01-01

## 2022-01-01 RX ORDER — MIDAZOLAM HYDROCHLORIDE 1 MG/ML
0.5 INJECTION, SOLUTION INTRAMUSCULAR; INTRAVENOUS
Status: DISCONTINUED | OUTPATIENT
Start: 2022-01-01 | End: 2022-01-01 | Stop reason: HOSPADM

## 2022-01-01 RX ORDER — SODIUM BICARBONATE 1 MEQ/ML
SYRINGE (ML) INTRAVENOUS AS NEEDED
Status: DISCONTINUED | OUTPATIENT
Start: 2022-01-01 | End: 2022-01-01 | Stop reason: HOSPADM

## 2022-01-01 RX ORDER — HYDRALAZINE HYDROCHLORIDE 50 MG/1
50 TABLET, FILM COATED ORAL 3 TIMES DAILY
Status: DISCONTINUED | OUTPATIENT
Start: 2022-01-01 | End: 2022-01-01

## 2022-01-01 RX ORDER — SODIUM CHLORIDE 9 MG/ML
100 INJECTION, SOLUTION INTRAVENOUS CONTINUOUS
Status: DISCONTINUED | OUTPATIENT
Start: 2022-01-01 | End: 2022-01-01 | Stop reason: DRUGHIGH

## 2022-01-01 RX ORDER — HYDROCODONE BITARTRATE AND ACETAMINOPHEN 5; 325 MG/1; MG/1
1 TABLET ORAL
Status: DISCONTINUED | OUTPATIENT
Start: 2022-01-01 | End: 2022-01-01

## 2022-01-01 RX ORDER — PROPOFOL 10 MG/ML
0-50 VIAL (ML) INTRAVENOUS
Status: DISCONTINUED | OUTPATIENT
Start: 2022-01-01 | End: 2022-01-01

## 2022-01-01 RX ORDER — METOPROLOL TARTRATE 25 MG/1
50 TABLET, FILM COATED ORAL 2 TIMES DAILY
Status: DISCONTINUED | OUTPATIENT
Start: 2022-01-01 | End: 2022-01-01

## 2022-01-01 RX ORDER — FLUCONAZOLE 100 MG/1
100 TABLET ORAL DAILY
Status: DISCONTINUED | OUTPATIENT
Start: 2022-01-01 | End: 2022-01-01

## 2022-01-01 RX ORDER — HYDRALAZINE HYDROCHLORIDE 20 MG/ML
10 INJECTION INTRAMUSCULAR; INTRAVENOUS
Status: DISCONTINUED | OUTPATIENT
Start: 2022-01-01 | End: 2022-01-01 | Stop reason: HOSPADM

## 2022-01-01 RX ORDER — CIPROFLOXACIN 500 MG/1
500 TABLET ORAL EVERY 12 HOURS
Status: DISCONTINUED | OUTPATIENT
Start: 2022-01-01 | End: 2022-01-01

## 2022-01-01 RX ORDER — ATORVASTATIN CALCIUM 20 MG/1
TABLET, FILM COATED ORAL
Qty: 30 TABLET | Refills: 5 | Status: SHIPPED | OUTPATIENT
Start: 2022-01-01 | End: 2022-06-27

## 2022-01-01 RX ORDER — PHENAZOPYRIDINE HYDROCHLORIDE 95 MG/1
95 TABLET ORAL DAILY
Status: COMPLETED | OUTPATIENT
Start: 2022-01-01 | End: 2022-01-01

## 2022-01-01 RX ORDER — INSULIN GLARGINE 100 [IU]/ML
10 INJECTION, SOLUTION SUBCUTANEOUS
Status: DISCONTINUED | OUTPATIENT
Start: 2022-01-01 | End: 2022-01-01 | Stop reason: HOSPADM

## 2022-01-01 RX ORDER — FLUCYTOSINE 500 MG/1
500 CAPSULE ORAL EVERY 6 HOURS
Qty: 56 CAPSULE | Refills: 0 | Status: SHIPPED | OUTPATIENT
Start: 2022-01-01 | End: 2022-01-01

## 2022-01-01 RX ORDER — AMLODIPINE BESYLATE 5 MG/1
10 TABLET ORAL DAILY
Status: DISCONTINUED | OUTPATIENT
Start: 2022-01-01 | End: 2022-01-01

## 2022-01-01 RX ORDER — SODIUM CHLORIDE 0.9 % (FLUSH) 0.9 %
5 SYRINGE (ML) INJECTION AS NEEDED
Status: DISCONTINUED | OUTPATIENT
Start: 2022-01-01 | End: 2022-06-27 | Stop reason: HOSPADM

## 2022-01-01 RX ORDER — CLOPIDOGREL BISULFATE 75 MG/1
75 TABLET ORAL DAILY
Status: DISCONTINUED | OUTPATIENT
Start: 2022-01-01 | End: 2022-01-01 | Stop reason: SDUPTHER

## 2022-01-01 RX ORDER — METOPROLOL SUCCINATE 25 MG/1
25 TABLET, EXTENDED RELEASE ORAL DAILY
Status: DISCONTINUED | OUTPATIENT
Start: 2022-01-01 | End: 2022-01-01

## 2022-01-01 RX ORDER — LORAZEPAM 2 MG/ML
1 CONCENTRATE ORAL EVERY 4 HOURS
Qty: 18 ML | Refills: 0 | Status: SHIPPED | OUTPATIENT
Start: 2022-01-01

## 2022-01-01 RX ORDER — OXYCODONE AND ACETAMINOPHEN 5; 325 MG/1; MG/1
1 TABLET ORAL AS NEEDED
Status: CANCELLED | OUTPATIENT
Start: 2022-01-01

## 2022-01-01 RX ORDER — ACETAMINOPHEN 650 MG/1
975 SUPPOSITORY RECTAL
Status: COMPLETED | OUTPATIENT
Start: 2022-01-01 | End: 2022-01-01

## 2022-01-01 RX ORDER — CLOPIDOGREL BISULFATE 75 MG/1
75 TABLET ORAL DAILY
Status: DISCONTINUED | OUTPATIENT
Start: 2022-01-01 | End: 2022-01-01

## 2022-01-01 RX ORDER — DIPHENHYDRAMINE HYDROCHLORIDE 50 MG/ML
12.5 INJECTION, SOLUTION INTRAMUSCULAR; INTRAVENOUS AS NEEDED
Status: DISCONTINUED | OUTPATIENT
Start: 2022-01-01 | End: 2022-01-01 | Stop reason: HOSPADM

## 2022-01-01 RX ORDER — INSULIN GLARGINE 100 [IU]/ML
40 INJECTION, SOLUTION SUBCUTANEOUS
Status: DISCONTINUED | OUTPATIENT
Start: 2022-01-01 | End: 2022-01-01

## 2022-01-01 RX ORDER — METOPROLOL TARTRATE 50 MG/1
50 TABLET ORAL 2 TIMES DAILY
Status: DISCONTINUED | OUTPATIENT
Start: 2022-01-01 | End: 2022-01-01 | Stop reason: HOSPADM

## 2022-01-01 RX ORDER — SODIUM BICARBONATE 42 MG/ML
50 INJECTION, SOLUTION INTRAVENOUS ONCE
Status: DISCONTINUED | OUTPATIENT
Start: 2022-01-01 | End: 2022-01-01

## 2022-01-01 RX ORDER — ATORVASTATIN CALCIUM 20 MG/1
20 TABLET, FILM COATED ORAL
Status: DISCONTINUED | OUTPATIENT
Start: 2022-01-01 | End: 2022-01-01

## 2022-01-01 RX ORDER — FUROSEMIDE 10 MG/ML
40 INJECTION INTRAMUSCULAR; INTRAVENOUS EVERY 12 HOURS
Status: DISCONTINUED | OUTPATIENT
Start: 2022-01-01 | End: 2022-01-01 | Stop reason: HOSPADM

## 2022-01-01 RX ORDER — HYDROMORPHONE HYDROCHLORIDE 2 MG/1
8 TABLET ORAL
Status: DISCONTINUED | OUTPATIENT
Start: 2022-01-01 | End: 2022-06-27 | Stop reason: HOSPADM

## 2022-01-01 RX ORDER — INSULIN GLARGINE 100 [IU]/ML
27 INJECTION, SOLUTION SUBCUTANEOUS 2 TIMES DAILY
Status: DISCONTINUED | OUTPATIENT
Start: 2022-01-01 | End: 2022-01-01

## 2022-01-01 RX ORDER — GABAPENTIN 300 MG/1
300 CAPSULE ORAL 2 TIMES DAILY
Qty: 12 CAPSULE | Refills: 0 | Status: SHIPPED | OUTPATIENT
Start: 2022-01-01 | End: 2022-06-27

## 2022-01-01 RX ADMIN — IPRATROPIUM BROMIDE AND ALBUTEROL SULFATE 3 ML: .5; 3 SOLUTION RESPIRATORY (INHALATION) at 13:01

## 2022-01-01 RX ADMIN — GLYCOPYRROLATE 0.4 MG: 0.2 INJECTION INTRAMUSCULAR; INTRAVENOUS at 14:14

## 2022-01-01 RX ADMIN — FERROUS SULFATE TAB 325 MG (65 MG ELEMENTAL FE) 325 MG: 325 (65 FE) TAB at 21:45

## 2022-01-01 RX ADMIN — INSULIN GLARGINE 10 UNITS: 100 INJECTION, SOLUTION SUBCUTANEOUS at 21:06

## 2022-01-01 RX ADMIN — SODIUM CHLORIDE, PRESERVATIVE FREE 10 ML: 5 INJECTION INTRAVENOUS at 21:05

## 2022-01-01 RX ADMIN — SODIUM CHLORIDE 3 G: 900 INJECTION INTRAVENOUS at 21:16

## 2022-01-01 RX ADMIN — INSULIN LISPRO 10 UNITS: 100 INJECTION, SOLUTION INTRAVENOUS; SUBCUTANEOUS at 18:00

## 2022-01-01 RX ADMIN — BRIMONIDINE TARTRATE 1 DROP: 2 SOLUTION/ DROPS OPHTHALMIC at 22:20

## 2022-01-01 RX ADMIN — CHOLESTYRAMINE 4 G: 4 POWDER, FOR SUSPENSION ORAL at 11:14

## 2022-01-01 RX ADMIN — AMLODIPINE BESYLATE 5 MG: 5 TABLET ORAL at 10:26

## 2022-01-01 RX ADMIN — INSULIN LISPRO 3 UNITS: 100 INJECTION, SOLUTION INTRAVENOUS; SUBCUTANEOUS at 23:52

## 2022-01-01 RX ADMIN — GABAPENTIN 300 MG: 300 CAPSULE ORAL at 08:57

## 2022-01-01 RX ADMIN — HYOSCYAMINE SULFATE: 16 SOLUTION at 21:17

## 2022-01-01 RX ADMIN — SODIUM CHLORIDE, PRESERVATIVE FREE 10 ML: 5 INJECTION INTRAVENOUS at 13:48

## 2022-01-01 RX ADMIN — GABAPENTIN 300 MG: 300 CAPSULE ORAL at 22:59

## 2022-01-01 RX ADMIN — FLUCONAZOLE 200 MG: 200 INJECTION, SOLUTION INTRAVENOUS at 09:14

## 2022-01-01 RX ADMIN — SODIUM CHLORIDE, PRESERVATIVE FREE 10 ML: 5 INJECTION INTRAVENOUS at 22:13

## 2022-01-01 RX ADMIN — FLUCONAZOLE 200 MG: 200 INJECTION, SOLUTION INTRAVENOUS at 10:05

## 2022-01-01 RX ADMIN — GABAPENTIN 300 MG: 300 CAPSULE ORAL at 21:43

## 2022-01-01 RX ADMIN — BRIMONIDINE TARTRATE 1 DROP: 2 SOLUTION OPHTHALMIC at 08:50

## 2022-01-01 RX ADMIN — ATORVASTATIN CALCIUM 20 MG: 20 TABLET, FILM COATED ORAL at 23:47

## 2022-01-01 RX ADMIN — ATORVASTATIN CALCIUM 20 MG: 20 TABLET, FILM COATED ORAL at 21:54

## 2022-01-01 RX ADMIN — HYDRALAZINE HYDROCHLORIDE 50 MG: 50 TABLET, FILM COATED ORAL at 16:44

## 2022-01-01 RX ADMIN — BRIMONIDINE TARTRATE 1 DROP: 2 SOLUTION OPHTHALMIC at 17:21

## 2022-01-01 RX ADMIN — INSULIN LISPRO 10 UNITS: 100 INJECTION, SOLUTION INTRAVENOUS; SUBCUTANEOUS at 05:58

## 2022-01-01 RX ADMIN — DIBASIC SODIUM PHOSPHATE, MONOBASIC POTASSIUM PHOSPHATE AND MONOBASIC SODIUM PHOSPHATE 2 TABLET: 852; 155; 130 TABLET ORAL at 11:38

## 2022-01-01 RX ADMIN — METOPROLOL TARTRATE 50 MG: 50 TABLET, FILM COATED ORAL at 09:59

## 2022-01-01 RX ADMIN — GABAPENTIN 300 MG: 300 CAPSULE ORAL at 21:09

## 2022-01-01 RX ADMIN — AMLODIPINE BESYLATE 10 MG: 5 TABLET ORAL at 10:36

## 2022-01-01 RX ADMIN — AMLODIPINE BESYLATE 5 MG: 5 TABLET ORAL at 08:32

## 2022-01-01 RX ADMIN — INSULIN GLARGINE 22 UNITS: 100 INJECTION, SOLUTION SUBCUTANEOUS at 20:18

## 2022-01-01 RX ADMIN — GABAPENTIN 300 MG: 300 CAPSULE ORAL at 08:41

## 2022-01-01 RX ADMIN — GLYCOPYRROLATE 0.4 MG: 0.2 INJECTION INTRAMUSCULAR; INTRAVENOUS at 01:11

## 2022-01-01 RX ADMIN — MINERAL SUPPLEMENT IRON 300 MG / 5 ML STRENGTH LIQUID 100 PER BOX UNFLAVORED 300 MG: at 20:47

## 2022-01-01 RX ADMIN — POTASSIUM CHLORIDE 40 MEQ: 1.5 POWDER, FOR SOLUTION ORAL at 10:47

## 2022-01-01 RX ADMIN — SODIUM CHLORIDE, PRESERVATIVE FREE 10 ML: 5 INJECTION INTRAVENOUS at 06:20

## 2022-01-01 RX ADMIN — SODIUM CHLORIDE, PRESERVATIVE FREE 10 ML: 5 INJECTION INTRAVENOUS at 21:09

## 2022-01-01 RX ADMIN — HEPARIN SODIUM 5000 UNITS: 5000 INJECTION INTRAVENOUS; SUBCUTANEOUS at 00:09

## 2022-01-01 RX ADMIN — FLUCONAZOLE 200 MG: 2 INJECTION, SOLUTION INTRAVENOUS at 20:41

## 2022-01-01 RX ADMIN — NITROGLYCERIN 1 INCH: 20 OINTMENT TOPICAL at 05:54

## 2022-01-01 RX ADMIN — INSULIN LISPRO 6 UNITS: 100 INJECTION, SOLUTION INTRAVENOUS; SUBCUTANEOUS at 08:20

## 2022-01-01 RX ADMIN — HEPARIN SODIUM 5000 UNITS: 5000 INJECTION INTRAVENOUS; SUBCUTANEOUS at 21:55

## 2022-01-01 RX ADMIN — INSULIN GLARGINE 22 UNITS: 100 INJECTION, SOLUTION SUBCUTANEOUS at 08:27

## 2022-01-01 RX ADMIN — BRIMONIDINE TARTRATE 1 DROP: 2 SOLUTION OPHTHALMIC at 13:00

## 2022-01-01 RX ADMIN — GLYCOPYRROLATE 0.4 MG: 0.2 INJECTION INTRAMUSCULAR; INTRAVENOUS at 04:53

## 2022-01-01 RX ADMIN — ENOXAPARIN SODIUM 40 MG: 100 INJECTION SUBCUTANEOUS at 09:46

## 2022-01-01 RX ADMIN — FLUCONAZOLE 200 MG: 200 INJECTION, SOLUTION INTRAVENOUS at 08:43

## 2022-01-01 RX ADMIN — GABAPENTIN 300 MG: 300 CAPSULE ORAL at 09:52

## 2022-01-01 RX ADMIN — INSULIN LISPRO 9 UNITS: 100 INJECTION, SOLUTION INTRAVENOUS; SUBCUTANEOUS at 22:54

## 2022-01-01 RX ADMIN — HYDRALAZINE HYDROCHLORIDE 12.5 MG: 25 TABLET, FILM COATED ORAL at 20:35

## 2022-01-01 RX ADMIN — Medication 1 MG: at 10:10

## 2022-01-01 RX ADMIN — LATANOPROST 1 DROP: 50 SOLUTION OPHTHALMIC at 17:16

## 2022-01-01 RX ADMIN — INSULIN LISPRO 9 UNITS: 100 INJECTION, SOLUTION INTRAVENOUS; SUBCUTANEOUS at 06:11

## 2022-01-01 RX ADMIN — SODIUM CHLORIDE, PRESERVATIVE FREE 10 ML: 5 INJECTION INTRAVENOUS at 21:24

## 2022-01-01 RX ADMIN — AMLODIPINE BESYLATE 10 MG: 5 TABLET ORAL at 08:12

## 2022-01-01 RX ADMIN — SACUBITRIL AND VALSARTAN 1 TABLET: 24; 26 TABLET, FILM COATED ORAL at 20:47

## 2022-01-01 RX ADMIN — LATANOPROST 1 DROP: 50 SOLUTION OPHTHALMIC at 18:42

## 2022-01-01 RX ADMIN — HYDROCODONE BITARTRATE AND ACETAMINOPHEN 1 TABLET: 5; 325 TABLET ORAL at 01:23

## 2022-01-01 RX ADMIN — INSULIN GLARGINE 32 UNITS: 100 INJECTION, SOLUTION SUBCUTANEOUS at 21:53

## 2022-01-01 RX ADMIN — GABAPENTIN 300 MG: 300 CAPSULE ORAL at 08:10

## 2022-01-01 RX ADMIN — ACETAMINOPHEN 650 MG: 650 SOLUTION ORAL at 15:53

## 2022-01-01 RX ADMIN — INSULIN LISPRO 2 UNITS: 100 INJECTION, SOLUTION INTRAVENOUS; SUBCUTANEOUS at 06:01

## 2022-01-01 RX ADMIN — CHOLESTYRAMINE 4 G: 4 POWDER, FOR SUSPENSION ORAL at 21:00

## 2022-01-01 RX ADMIN — INSULIN LISPRO 7 UNITS: 100 INJECTION, SOLUTION INTRAVENOUS; SUBCUTANEOUS at 12:00

## 2022-01-01 RX ADMIN — BRIMONIDINE TARTRATE 1 DROP: 2 SOLUTION/ DROPS OPHTHALMIC at 16:24

## 2022-01-01 RX ADMIN — INSULIN GLARGINE 10 UNITS: 100 INJECTION, SOLUTION SUBCUTANEOUS at 21:36

## 2022-01-01 RX ADMIN — SODIUM CHLORIDE, PRESERVATIVE FREE 10 ML: 5 INJECTION INTRAVENOUS at 05:56

## 2022-01-01 RX ADMIN — INSULIN GLARGINE 10 UNITS: 100 INJECTION, SOLUTION SUBCUTANEOUS at 23:13

## 2022-01-01 RX ADMIN — ASPIRIN 81 MG CHEWABLE TABLET 81 MG: 81 TABLET CHEWABLE at 08:51

## 2022-01-01 RX ADMIN — HYDRALAZINE HYDROCHLORIDE 50 MG: 50 TABLET, FILM COATED ORAL at 20:56

## 2022-01-01 RX ADMIN — SODIUM CHLORIDE, PRESERVATIVE FREE 10 ML: 5 INJECTION INTRAVENOUS at 14:30

## 2022-01-01 RX ADMIN — ATORVASTATIN CALCIUM 20 MG: 20 TABLET, FILM COATED ORAL at 20:40

## 2022-01-01 RX ADMIN — BRIMONIDINE TARTRATE 1 DROP: 2 SOLUTION OPHTHALMIC at 21:27

## 2022-01-01 RX ADMIN — ASPIRIN 81 MG: 81 TABLET, COATED ORAL at 08:54

## 2022-01-01 RX ADMIN — PHYTONADIONE 10 MG: 10 INJECTION, EMULSION INTRAMUSCULAR; INTRAVENOUS; SUBCUTANEOUS at 06:48

## 2022-01-01 RX ADMIN — PROPOFOL 30 MCG/KG/MIN: 10 INJECTION, EMULSION INTRAVENOUS at 04:32

## 2022-01-01 RX ADMIN — GABAPENTIN 300 MG: 300 CAPSULE ORAL at 22:01

## 2022-01-01 RX ADMIN — METOPROLOL SUCCINATE 50 MG: 50 TABLET, EXTENDED RELEASE ORAL at 08:00

## 2022-01-01 RX ADMIN — INSULIN GLARGINE 32 UNITS: 100 INJECTION, SOLUTION SUBCUTANEOUS at 22:07

## 2022-01-01 RX ADMIN — ASPIRIN 81 MG CHEWABLE TABLET 81 MG: 81 TABLET CHEWABLE at 09:46

## 2022-01-01 RX ADMIN — Medication 1 SPRAY: at 22:12

## 2022-01-01 RX ADMIN — BRIMONIDINE TARTRATE 1 DROP: 2 SOLUTION OPHTHALMIC at 15:49

## 2022-01-01 RX ADMIN — MEROPENEM 1 G: 1 INJECTION, POWDER, FOR SOLUTION INTRAVENOUS at 05:06

## 2022-01-01 RX ADMIN — HYDROMORPHONE HYDROCHLORIDE 8 MG: 2 TABLET ORAL at 20:07

## 2022-01-01 RX ADMIN — BRIMONIDINE TARTRATE 1 DROP: 2 SOLUTION/ DROPS OPHTHALMIC at 21:52

## 2022-01-01 RX ADMIN — BRIMONIDINE TARTRATE 1 DROP: 2 SOLUTION OPHTHALMIC at 22:47

## 2022-01-01 RX ADMIN — POTASSIUM CHLORIDE 10 MEQ: 7.46 INJECTION, SOLUTION INTRAVENOUS at 08:46

## 2022-01-01 RX ADMIN — MINERAL SUPPLEMENT IRON 300 MG / 5 ML STRENGTH LIQUID 100 PER BOX UNFLAVORED 300 MG: at 09:48

## 2022-01-01 RX ADMIN — INSULIN GLARGINE 32 UNITS: 100 INJECTION, SOLUTION SUBCUTANEOUS at 21:00

## 2022-01-01 RX ADMIN — ACETAMINOPHEN 650 MG: 650 SOLUTION ORAL at 22:03

## 2022-01-01 RX ADMIN — GLYCOPYRROLATE 0.4 MG: 0.2 INJECTION INTRAMUSCULAR; INTRAVENOUS at 18:19

## 2022-01-01 RX ADMIN — METHYLPREDNISOLONE SODIUM SUCCINATE 40 MG: 40 INJECTION, POWDER, FOR SOLUTION INTRAMUSCULAR; INTRAVENOUS at 05:10

## 2022-01-01 RX ADMIN — LATANOPROST 1 DROP: 50 SOLUTION OPHTHALMIC at 17:15

## 2022-01-01 RX ADMIN — METOPROLOL SUCCINATE 50 MG: 50 TABLET, EXTENDED RELEASE ORAL at 08:12

## 2022-01-01 RX ADMIN — ATORVASTATIN CALCIUM 20 MG: 20 TABLET, FILM COATED ORAL at 21:58

## 2022-01-01 RX ADMIN — HEPARIN SODIUM 5000 UNITS: 5000 INJECTION INTRAVENOUS; SUBCUTANEOUS at 10:25

## 2022-01-01 RX ADMIN — Medication 1 SPRAY: at 09:13

## 2022-01-01 RX ADMIN — SODIUM CHLORIDE 3 G: 900 INJECTION INTRAVENOUS at 05:43

## 2022-01-01 RX ADMIN — METOPROLOL TARTRATE 50 MG: 50 TABLET, FILM COATED ORAL at 10:26

## 2022-01-01 RX ADMIN — INSULIN LISPRO 15 UNITS: 100 INJECTION, SOLUTION INTRAVENOUS; SUBCUTANEOUS at 12:42

## 2022-01-01 RX ADMIN — INSULIN LISPRO 4 UNITS: 100 INJECTION, SOLUTION INTRAVENOUS; SUBCUTANEOUS at 23:54

## 2022-01-01 RX ADMIN — INSULIN LISPRO 1 UNITS: 100 INJECTION, SOLUTION INTRAVENOUS; SUBCUTANEOUS at 18:29

## 2022-01-01 RX ADMIN — FAMOTIDINE 20 MG: 20 TABLET ORAL at 20:59

## 2022-01-01 RX ADMIN — Medication 2 TABLET: at 08:19

## 2022-01-01 RX ADMIN — SACUBITRIL AND VALSARTAN 1 TABLET: 24; 26 TABLET, FILM COATED ORAL at 22:59

## 2022-01-01 RX ADMIN — AMLODIPINE BESYLATE 5 MG: 5 TABLET ORAL at 15:43

## 2022-01-01 RX ADMIN — INSULIN LISPRO 4 UNITS: 100 INJECTION, SOLUTION INTRAVENOUS; SUBCUTANEOUS at 00:00

## 2022-01-01 RX ADMIN — GABAPENTIN 300 MG: 300 CAPSULE ORAL at 21:10

## 2022-01-01 RX ADMIN — AMLODIPINE BESYLATE 5 MG: 5 TABLET ORAL at 09:59

## 2022-01-01 RX ADMIN — Medication 1 SPRAY: at 10:18

## 2022-01-01 RX ADMIN — LORAZEPAM 1 MG: 2 INJECTION INTRAMUSCULAR at 00:40

## 2022-01-01 RX ADMIN — MEROPENEM 1 G: 1 INJECTION, POWDER, FOR SOLUTION INTRAVENOUS at 05:52

## 2022-01-01 RX ADMIN — INSULIN LISPRO 2 UNITS: 100 INJECTION, SOLUTION INTRAVENOUS; SUBCUTANEOUS at 01:34

## 2022-01-01 RX ADMIN — GABAPENTIN 300 MG: 300 CAPSULE ORAL at 08:27

## 2022-01-01 RX ADMIN — Medication 2 TABLET: at 09:47

## 2022-01-01 RX ADMIN — SODIUM CHLORIDE, PRESERVATIVE FREE 10 ML: 5 INJECTION INTRAVENOUS at 13:46

## 2022-01-01 RX ADMIN — HYDRALAZINE HYDROCHLORIDE 50 MG: 50 TABLET, FILM COATED ORAL at 20:47

## 2022-01-01 RX ADMIN — PROPOFOL 25 MCG/KG/MIN: 10 INJECTION, EMULSION INTRAVENOUS at 01:28

## 2022-01-01 RX ADMIN — HYDRALAZINE HYDROCHLORIDE 12.5 MG: 25 TABLET, FILM COATED ORAL at 15:08

## 2022-01-01 RX ADMIN — INSULIN LISPRO 6 UNITS: 100 INJECTION, SOLUTION INTRAVENOUS; SUBCUTANEOUS at 16:30

## 2022-01-01 RX ADMIN — CHOLESTYRAMINE 4 G: 4 POWDER, FOR SUSPENSION ORAL at 09:46

## 2022-01-01 RX ADMIN — INSULIN LISPRO 6 UNITS: 100 INJECTION, SOLUTION INTRAVENOUS; SUBCUTANEOUS at 08:13

## 2022-01-01 RX ADMIN — FERROUS SULFATE TAB 325 MG (65 MG ELEMENTAL FE) 325 MG: 325 (65 FE) TAB at 08:21

## 2022-01-01 RX ADMIN — GLYCOPYRROLATE 0.4 MG: 0.2 INJECTION INTRAMUSCULAR; INTRAVENOUS at 09:38

## 2022-01-01 RX ADMIN — SODIUM CHLORIDE, PRESERVATIVE FREE 10 ML: 5 INJECTION INTRAVENOUS at 14:02

## 2022-01-01 RX ADMIN — METOPROLOL TARTRATE 12.5 MG: 25 TABLET, FILM COATED ORAL at 08:01

## 2022-01-01 RX ADMIN — VANCOMYCIN HYDROCHLORIDE 750 MG: 750 INJECTION, POWDER, LYOPHILIZED, FOR SOLUTION INTRAVENOUS at 15:10

## 2022-01-01 RX ADMIN — PIPERACILLIN AND TAZOBACTAM 3.38 G: 3; .375 INJECTION, POWDER, LYOPHILIZED, FOR SOLUTION INTRAVENOUS at 04:11

## 2022-01-01 RX ADMIN — GLYCOPYRROLATE 0.1 MG: 0.2 INJECTION INTRAMUSCULAR; INTRAVENOUS at 11:10

## 2022-01-01 RX ADMIN — FLUCONAZOLE 200 MG: 200 INJECTION, SOLUTION INTRAVENOUS at 08:09

## 2022-01-01 RX ADMIN — NITROGLYCERIN 1 INCH: 20 OINTMENT TOPICAL at 18:04

## 2022-01-01 RX ADMIN — CHOLESTYRAMINE 4 G: 4 POWDER, FOR SUSPENSION ORAL at 17:17

## 2022-01-01 RX ADMIN — CEFTOLOZANE AND TAZOBACTAM 3 G: 1; .5 INJECTION, POWDER, LYOPHILIZED, FOR SOLUTION INTRAVENOUS at 14:16

## 2022-01-01 RX ADMIN — INSULIN LISPRO 7 UNITS: 100 INJECTION, SOLUTION INTRAVENOUS; SUBCUTANEOUS at 18:00

## 2022-01-01 RX ADMIN — INSULIN GLARGINE 50 UNITS: 100 INJECTION, SOLUTION SUBCUTANEOUS at 09:43

## 2022-01-01 RX ADMIN — BRIMONIDINE TARTRATE 1 DROP: 2 SOLUTION OPHTHALMIC at 09:00

## 2022-01-01 RX ADMIN — GABAPENTIN 300 MG: 300 CAPSULE ORAL at 21:30

## 2022-01-01 RX ADMIN — METOPROLOL TARTRATE 50 MG: 50 TABLET, FILM COATED ORAL at 21:12

## 2022-01-01 RX ADMIN — CHOLESTYRAMINE 4 G: 4 POWDER, FOR SUSPENSION ORAL at 16:32

## 2022-01-01 RX ADMIN — HEPARIN SODIUM 5000 UNITS: 5000 INJECTION INTRAVENOUS; SUBCUTANEOUS at 08:48

## 2022-01-01 RX ADMIN — FERROUS SULFATE TAB 325 MG (65 MG ELEMENTAL FE) 325 MG: 325 (65 FE) TAB at 20:49

## 2022-01-01 RX ADMIN — INSULIN GLARGINE 32 UNITS: 100 INJECTION, SOLUTION SUBCUTANEOUS at 10:12

## 2022-01-01 RX ADMIN — INSULIN LISPRO 4 UNITS: 100 INJECTION, SOLUTION INTRAVENOUS; SUBCUTANEOUS at 22:46

## 2022-01-01 RX ADMIN — SODIUM CHLORIDE, PRESERVATIVE FREE 10 ML: 5 INJECTION INTRAVENOUS at 14:16

## 2022-01-01 RX ADMIN — INSULIN LISPRO 7 UNITS: 100 INJECTION, SOLUTION INTRAVENOUS; SUBCUTANEOUS at 05:54

## 2022-01-01 RX ADMIN — ALBUMIN (HUMAN) 25 G: 0.25 INJECTION, SOLUTION INTRAVENOUS at 17:11

## 2022-01-01 RX ADMIN — SODIUM CHLORIDE, PRESERVATIVE FREE 10 ML: 5 INJECTION INTRAVENOUS at 05:12

## 2022-01-01 RX ADMIN — LATANOPROST 1 DROP: 50 SOLUTION OPHTHALMIC at 17:27

## 2022-01-01 RX ADMIN — BRIMONIDINE TARTRATE 1 DROP: 2 SOLUTION/ DROPS OPHTHALMIC at 15:27

## 2022-01-01 RX ADMIN — GLYCOPYRROLATE 0.4 MG: 0.2 INJECTION INTRAMUSCULAR; INTRAVENOUS at 05:35

## 2022-01-01 RX ADMIN — INSULIN LISPRO 10 UNITS: 100 INJECTION, SOLUTION INTRAVENOUS; SUBCUTANEOUS at 12:26

## 2022-01-01 RX ADMIN — GLYCOPYRROLATE 0.1 MG: 0.2 INJECTION INTRAMUSCULAR; INTRAVENOUS at 08:09

## 2022-01-01 RX ADMIN — GLYCOPYRROLATE 0.4 MG: 0.2 INJECTION INTRAMUSCULAR; INTRAVENOUS at 21:33

## 2022-01-01 RX ADMIN — BRIMONIDINE TARTRATE 1 DROP: 2 SOLUTION OPHTHALMIC at 09:03

## 2022-01-01 RX ADMIN — BRIMONIDINE TARTRATE 1 DROP: 2 SOLUTION OPHTHALMIC at 18:14

## 2022-01-01 RX ADMIN — WHITE PETROLATUM,ZINC OXIDE: 20; 75 PASTE TOPICAL at 21:00

## 2022-01-01 RX ADMIN — BRIMONIDINE TARTRATE 1 DROP: 2 SOLUTION OPHTHALMIC at 10:04

## 2022-01-01 RX ADMIN — HEPARIN SODIUM 5000 UNITS: 5000 INJECTION INTRAVENOUS; SUBCUTANEOUS at 16:34

## 2022-01-01 RX ADMIN — BRIMONIDINE TARTRATE 1 DROP: 2 SOLUTION OPHTHALMIC at 11:20

## 2022-01-01 RX ADMIN — INSULIN LISPRO 1 UNITS: 100 INJECTION, SOLUTION INTRAVENOUS; SUBCUTANEOUS at 18:55

## 2022-01-01 RX ADMIN — AMLODIPINE BESYLATE 5 MG: 5 TABLET ORAL at 09:29

## 2022-01-01 RX ADMIN — BRIMONIDINE TARTRATE 1 DROP: 2 SOLUTION/ DROPS OPHTHALMIC at 16:13

## 2022-01-01 RX ADMIN — INSULIN LISPRO 10 UNITS: 100 INJECTION, SOLUTION INTRAVENOUS; SUBCUTANEOUS at 18:58

## 2022-01-01 RX ADMIN — CHOLESTYRAMINE 4 G: 4 POWDER, FOR SUSPENSION ORAL at 08:00

## 2022-01-01 RX ADMIN — MINERAL SUPPLEMENT IRON 300 MG / 5 ML STRENGTH LIQUID 100 PER BOX UNFLAVORED 300 MG: at 01:40

## 2022-01-01 RX ADMIN — HYDRALAZINE HYDROCHLORIDE 50 MG: 50 TABLET, FILM COATED ORAL at 22:51

## 2022-01-01 RX ADMIN — MEROPENEM 1 G: 1 INJECTION, POWDER, FOR SOLUTION INTRAVENOUS at 23:31

## 2022-01-01 RX ADMIN — LORAZEPAM 1 MG: 2 INJECTION INTRAMUSCULAR at 01:34

## 2022-01-01 RX ADMIN — FERROUS SULFATE TAB 325 MG (65 MG ELEMENTAL FE) 325 MG: 325 (65 FE) TAB at 11:11

## 2022-01-01 RX ADMIN — HYDRALAZINE HYDROCHLORIDE 12.5 MG: 25 TABLET, FILM COATED ORAL at 10:29

## 2022-01-01 RX ADMIN — FERROUS SULFATE TAB 325 MG (65 MG ELEMENTAL FE) 325 MG: 325 (65 FE) TAB at 20:59

## 2022-01-01 RX ADMIN — GLYCOPYRROLATE 0.4 MG: 0.2 INJECTION INTRAMUSCULAR; INTRAVENOUS at 00:40

## 2022-01-01 RX ADMIN — LATANOPROST 1 DROP: 50 SOLUTION OPHTHALMIC at 17:00

## 2022-01-01 RX ADMIN — BRIMONIDINE TARTRATE 1 DROP: 2 SOLUTION OPHTHALMIC at 17:18

## 2022-01-01 RX ADMIN — PIPERACILLIN SODIUM AND TAZOBACTAM SODIUM 3.38 G: 3; .375 INJECTION, POWDER, LYOPHILIZED, FOR SOLUTION INTRAVENOUS at 13:54

## 2022-01-01 RX ADMIN — INSULIN LISPRO 6 UNITS: 100 INJECTION, SOLUTION INTRAVENOUS; SUBCUTANEOUS at 17:26

## 2022-01-01 RX ADMIN — CHOLESTYRAMINE 4 G: 4 POWDER, FOR SUSPENSION ORAL at 10:18

## 2022-01-01 RX ADMIN — GABAPENTIN 300 MG: 300 CAPSULE ORAL at 10:05

## 2022-01-01 RX ADMIN — HYDRALAZINE HYDROCHLORIDE 12.5 MG: 25 TABLET, FILM COATED ORAL at 21:44

## 2022-01-01 RX ADMIN — SACUBITRIL AND VALSARTAN 1 TABLET: 24; 26 TABLET, FILM COATED ORAL at 09:15

## 2022-01-01 RX ADMIN — BRIMONIDINE TARTRATE 1 DROP: 2 SOLUTION OPHTHALMIC at 17:15

## 2022-01-01 RX ADMIN — INSULIN LISPRO 1 UNITS: 100 INJECTION, SOLUTION INTRAVENOUS; SUBCUTANEOUS at 17:56

## 2022-01-01 RX ADMIN — LORAZEPAM 1 MG: 2 INJECTION INTRAMUSCULAR at 04:53

## 2022-01-01 RX ADMIN — MINERAL SUPPLEMENT IRON 300 MG / 5 ML STRENGTH LIQUID 100 PER BOX UNFLAVORED 300 MG: at 23:45

## 2022-01-01 RX ADMIN — GABAPENTIN 300 MG: 300 CAPSULE ORAL at 22:03

## 2022-01-01 RX ADMIN — GABAPENTIN 300 MG: 300 CAPSULE ORAL at 22:58

## 2022-01-01 RX ADMIN — INSULIN LISPRO 3 UNITS: 100 INJECTION, SOLUTION INTRAVENOUS; SUBCUTANEOUS at 06:30

## 2022-01-01 RX ADMIN — BRIMONIDINE TARTRATE 1 DROP: 2 SOLUTION/ DROPS OPHTHALMIC at 08:47

## 2022-01-01 RX ADMIN — ASPIRIN 81 MG CHEWABLE TABLET 81 MG: 81 TABLET CHEWABLE at 09:52

## 2022-01-01 RX ADMIN — AMLODIPINE BESYLATE 5 MG: 5 TABLET ORAL at 10:02

## 2022-01-01 RX ADMIN — FERROUS SULFATE TAB 325 MG (65 MG ELEMENTAL FE) 325 MG: 325 (65 FE) TAB at 08:13

## 2022-01-01 RX ADMIN — POTASSIUM CHLORIDE 40 MEQ: 1.5 POWDER, FOR SOLUTION ORAL at 11:56

## 2022-01-01 RX ADMIN — LORAZEPAM 1 MG: 2 INJECTION INTRAMUSCULAR at 12:39

## 2022-01-01 RX ADMIN — HEPARIN SODIUM 5000 UNITS: 5000 INJECTION INTRAVENOUS; SUBCUTANEOUS at 06:50

## 2022-01-01 RX ADMIN — Medication 2 TABLET: at 09:19

## 2022-01-01 RX ADMIN — METOPROLOL TARTRATE 50 MG: 50 TABLET, FILM COATED ORAL at 09:32

## 2022-01-01 RX ADMIN — BRIMONIDINE TARTRATE 1 DROP: 2 SOLUTION/ DROPS OPHTHALMIC at 17:02

## 2022-01-01 RX ADMIN — PIPERACILLIN SODIUM AND TAZOBACTAM SODIUM 3.38 G: 3; .375 INJECTION, POWDER, LYOPHILIZED, FOR SOLUTION INTRAVENOUS at 02:07

## 2022-01-01 RX ADMIN — AMLODIPINE BESYLATE 5 MG: 5 TABLET ORAL at 08:56

## 2022-01-01 RX ADMIN — METOPROLOL TARTRATE 50 MG: 50 TABLET, FILM COATED ORAL at 09:30

## 2022-01-01 RX ADMIN — INSULIN LISPRO 6 UNITS: 100 INJECTION, SOLUTION INTRAVENOUS; SUBCUTANEOUS at 21:11

## 2022-01-01 RX ADMIN — LATANOPROST 1 DROP: 50 SOLUTION OPHTHALMIC at 17:32

## 2022-01-01 RX ADMIN — SODIUM CHLORIDE, PRESERVATIVE FREE 10 ML: 5 INJECTION INTRAVENOUS at 21:34

## 2022-01-01 RX ADMIN — LORAZEPAM 1 MG: 2 INJECTION INTRAMUSCULAR at 15:38

## 2022-01-01 RX ADMIN — INSULIN LISPRO 4 UNITS: 100 INJECTION, SOLUTION INTRAVENOUS; SUBCUTANEOUS at 06:12

## 2022-01-01 RX ADMIN — CHOLESTYRAMINE 4 G: 4 POWDER, FOR SUSPENSION ORAL at 08:01

## 2022-01-01 RX ADMIN — GABAPENTIN 300 MG: 300 CAPSULE ORAL at 09:00

## 2022-01-01 RX ADMIN — ENOXAPARIN SODIUM 40 MG: 100 INJECTION SUBCUTANEOUS at 08:09

## 2022-01-01 RX ADMIN — FLUCYTOSINE 500 MG: 500 CAPSULE ORAL at 20:42

## 2022-01-01 RX ADMIN — FLUCONAZOLE 200 MG: 2 INJECTION, SOLUTION INTRAVENOUS at 12:32

## 2022-01-01 RX ADMIN — LORAZEPAM 1 MG: 2 INJECTION INTRAMUSCULAR at 09:30

## 2022-01-01 RX ADMIN — INSULIN LISPRO 9 UNITS: 100 INJECTION, SOLUTION INTRAVENOUS; SUBCUTANEOUS at 11:38

## 2022-01-01 RX ADMIN — ENOXAPARIN SODIUM 40 MG: 100 INJECTION SUBCUTANEOUS at 08:01

## 2022-01-01 RX ADMIN — INSULIN LISPRO 5 UNITS: 100 INJECTION, SOLUTION INTRAVENOUS; SUBCUTANEOUS at 06:02

## 2022-01-01 RX ADMIN — INSULIN LISPRO 2 UNITS: 100 INJECTION, SOLUTION INTRAVENOUS; SUBCUTANEOUS at 11:49

## 2022-01-01 RX ADMIN — GABAPENTIN 300 MG: 300 CAPSULE ORAL at 22:17

## 2022-01-01 RX ADMIN — SODIUM CHLORIDE 200 MG: 9 INJECTION, SOLUTION INTRAVENOUS at 18:30

## 2022-01-01 RX ADMIN — CHOLESTYRAMINE 4 G: 4 POWDER, FOR SUSPENSION ORAL at 08:18

## 2022-01-01 RX ADMIN — MEROPENEM 1 G: 1 INJECTION, POWDER, FOR SOLUTION INTRAVENOUS at 10:10

## 2022-01-01 RX ADMIN — CHOLESTYRAMINE 4 G: 4 POWDER, FOR SUSPENSION ORAL at 16:14

## 2022-01-01 RX ADMIN — VANCOMYCIN HYDROCHLORIDE 750 MG: 750 INJECTION, POWDER, LYOPHILIZED, FOR SOLUTION INTRAVENOUS at 15:03

## 2022-01-01 RX ADMIN — BRIMONIDINE TARTRATE 1 DROP: 2 SOLUTION OPHTHALMIC at 22:00

## 2022-01-01 RX ADMIN — PROPOFOL 20 MCG/KG/MIN: 10 INJECTION, EMULSION INTRAVENOUS at 00:51

## 2022-01-01 RX ADMIN — DEXTROSE MONOHYDRATE 4 MCG/MIN: 50 INJECTION, SOLUTION INTRAVENOUS at 03:32

## 2022-01-01 RX ADMIN — SODIUM CHLORIDE, PRESERVATIVE FREE 10 ML: 5 INJECTION INTRAVENOUS at 05:47

## 2022-01-01 RX ADMIN — METOPROLOL TARTRATE 50 MG: 50 TABLET, FILM COATED ORAL at 23:48

## 2022-01-01 RX ADMIN — INSULIN LISPRO 3 UNITS: 100 INJECTION, SOLUTION INTRAVENOUS; SUBCUTANEOUS at 17:20

## 2022-01-01 RX ADMIN — ENOXAPARIN SODIUM 40 MG: 100 INJECTION SUBCUTANEOUS at 08:51

## 2022-01-01 RX ADMIN — GABAPENTIN 300 MG: 300 CAPSULE ORAL at 08:02

## 2022-01-01 RX ADMIN — INSULIN LISPRO 2 UNITS: 100 INJECTION, SOLUTION INTRAVENOUS; SUBCUTANEOUS at 00:52

## 2022-01-01 RX ADMIN — INSULIN LISPRO 4 UNITS: 100 INJECTION, SOLUTION INTRAVENOUS; SUBCUTANEOUS at 12:26

## 2022-01-01 RX ADMIN — BRIMONIDINE TARTRATE 1 DROP: 2 SOLUTION/ DROPS OPHTHALMIC at 21:30

## 2022-01-01 RX ADMIN — PIPERACILLIN AND TAZOBACTAM 3.38 G: 3; .375 INJECTION, POWDER, LYOPHILIZED, FOR SOLUTION INTRAVENOUS at 21:12

## 2022-01-01 RX ADMIN — BRIMONIDINE TARTRATE 1 DROP: 2 SOLUTION/ DROPS OPHTHALMIC at 20:43

## 2022-01-01 RX ADMIN — HYDROMORPHONE HYDROCHLORIDE 2 MG: 2 INJECTION, SOLUTION INTRAMUSCULAR; INTRAVENOUS; SUBCUTANEOUS at 12:16

## 2022-01-01 RX ADMIN — BRIMONIDINE TARTRATE 1 DROP: 2 SOLUTION/ DROPS OPHTHALMIC at 22:16

## 2022-01-01 RX ADMIN — Medication 1 MG: at 09:38

## 2022-01-01 RX ADMIN — MINERAL SUPPLEMENT IRON 300 MG / 5 ML STRENGTH LIQUID 100 PER BOX UNFLAVORED 300 MG: at 21:28

## 2022-01-01 RX ADMIN — INSULIN LISPRO 9 UNITS: 100 INJECTION, SOLUTION INTRAVENOUS; SUBCUTANEOUS at 18:00

## 2022-01-01 RX ADMIN — TRAMADOL HYDROCHLORIDE 25 MG: 50 TABLET, COATED ORAL at 01:19

## 2022-01-01 RX ADMIN — HEPARIN SODIUM 5000 UNITS: 5000 INJECTION INTRAVENOUS; SUBCUTANEOUS at 08:16

## 2022-01-01 RX ADMIN — FERROUS SULFATE TAB 325 MG (65 MG ELEMENTAL FE) 325 MG: 325 (65 FE) TAB at 09:24

## 2022-01-01 RX ADMIN — FUROSEMIDE 40 MG: 10 INJECTION, SOLUTION INTRAMUSCULAR; INTRAVENOUS at 10:09

## 2022-01-01 RX ADMIN — LORAZEPAM 1 MG: 2 INJECTION INTRAMUSCULAR at 03:12

## 2022-01-01 RX ADMIN — ASPIRIN 81 MG: 81 TABLET, COATED ORAL at 09:25

## 2022-01-01 RX ADMIN — Medication 1 MG: at 18:20

## 2022-01-01 RX ADMIN — INSULIN LISPRO 3 UNITS: 100 INJECTION, SOLUTION INTRAVENOUS; SUBCUTANEOUS at 18:38

## 2022-01-01 RX ADMIN — SODIUM CHLORIDE 3 G: 900 INJECTION INTRAVENOUS at 06:21

## 2022-01-01 RX ADMIN — VANCOMYCIN HYDROCHLORIDE 500 MG: 500 INJECTION, POWDER, LYOPHILIZED, FOR SOLUTION INTRAVENOUS at 17:43

## 2022-01-01 RX ADMIN — HYDRALAZINE HYDROCHLORIDE 50 MG: 50 TABLET, FILM COATED ORAL at 15:19

## 2022-01-01 RX ADMIN — FERROUS SULFATE TAB 325 MG (65 MG ELEMENTAL FE) 325 MG: 325 (65 FE) TAB at 08:00

## 2022-01-01 RX ADMIN — INSULIN LISPRO 10 UNITS: 100 INJECTION, SOLUTION INTRAVENOUS; SUBCUTANEOUS at 12:13

## 2022-01-01 RX ADMIN — WHITE PETROLATUM,ZINC OXIDE: 20; 75 PASTE TOPICAL at 09:00

## 2022-01-01 RX ADMIN — ENOXAPARIN SODIUM 40 MG: 100 INJECTION SUBCUTANEOUS at 10:02

## 2022-01-01 RX ADMIN — SODIUM CHLORIDE, PRESERVATIVE FREE 10 ML: 5 INJECTION INTRAVENOUS at 23:54

## 2022-01-01 RX ADMIN — GLYCOPYRROLATE 0.1 MG: 0.2 INJECTION INTRAMUSCULAR; INTRAVENOUS at 08:46

## 2022-01-01 RX ADMIN — METOPROLOL SUCCINATE 25 MG: 25 TABLET, EXTENDED RELEASE ORAL at 09:19

## 2022-01-01 RX ADMIN — INSULIN GLARGINE 32 UNITS: 100 INJECTION, SOLUTION SUBCUTANEOUS at 21:12

## 2022-01-01 RX ADMIN — HYDRALAZINE HYDROCHLORIDE 50 MG: 50 TABLET, FILM COATED ORAL at 20:54

## 2022-01-01 RX ADMIN — PROPOFOL 30 MCG/KG/MIN: 10 INJECTION, EMULSION INTRAVENOUS at 17:29

## 2022-01-01 RX ADMIN — HYDRALAZINE HYDROCHLORIDE 50 MG: 50 TABLET, FILM COATED ORAL at 09:31

## 2022-01-01 RX ADMIN — CEFTAZIDIME, AVIBACTAM 2.5 G: 2; .5 POWDER, FOR SOLUTION INTRAVENOUS at 06:02

## 2022-01-01 RX ADMIN — SODIUM CHLORIDE, PRESERVATIVE FREE 10 ML: 5 INJECTION INTRAVENOUS at 21:30

## 2022-01-01 RX ADMIN — SODIUM CHLORIDE, PRESERVATIVE FREE 10 ML: 5 INJECTION INTRAVENOUS at 21:38

## 2022-01-01 RX ADMIN — AMLODIPINE BESYLATE 5 MG: 5 TABLET ORAL at 08:48

## 2022-01-01 RX ADMIN — LATANOPROST 1 DROP: 50 SOLUTION OPHTHALMIC at 18:14

## 2022-01-01 RX ADMIN — PROPOFOL 25 MCG/KG/MIN: 10 INJECTION, EMULSION INTRAVENOUS at 18:24

## 2022-01-01 RX ADMIN — HEPARIN SODIUM 5000 UNITS: 5000 INJECTION INTRAVENOUS; SUBCUTANEOUS at 23:52

## 2022-01-01 RX ADMIN — HYDROMORPHONE HYDROCHLORIDE 1 MG: 1 INJECTION, SOLUTION INTRAMUSCULAR; INTRAVENOUS; SUBCUTANEOUS at 12:20

## 2022-01-01 RX ADMIN — GABAPENTIN 300 MG: 300 CAPSULE ORAL at 20:11

## 2022-01-01 RX ADMIN — MINERAL SUPPLEMENT IRON 300 MG / 5 ML STRENGTH LIQUID 100 PER BOX UNFLAVORED 300 MG: at 21:26

## 2022-01-01 RX ADMIN — BRIMONIDINE TARTRATE 1 DROP: 2 SOLUTION OPHTHALMIC at 15:22

## 2022-01-01 RX ADMIN — FLUCYTOSINE 500 MG: 500 CAPSULE ORAL at 01:52

## 2022-01-01 RX ADMIN — LORAZEPAM 1 MG: 2 INJECTION INTRAMUSCULAR at 01:00

## 2022-01-01 RX ADMIN — INSULIN LISPRO 1 UNITS: 100 INJECTION, SOLUTION INTRAVENOUS; SUBCUTANEOUS at 06:00

## 2022-01-01 RX ADMIN — INSULIN GLARGINE 25 UNITS: 100 INJECTION, SOLUTION SUBCUTANEOUS at 23:11

## 2022-01-01 RX ADMIN — GLYCOPYRROLATE 0.4 MG: 0.2 INJECTION INTRAMUSCULAR; INTRAVENOUS at 21:32

## 2022-01-01 RX ADMIN — BRIMONIDINE TARTRATE 1 DROP: 2 SOLUTION OPHTHALMIC at 19:05

## 2022-01-01 RX ADMIN — INSULIN GLARGINE 50 UNITS: 100 INJECTION, SOLUTION SUBCUTANEOUS at 09:20

## 2022-01-01 RX ADMIN — BRIMONIDINE TARTRATE 1 DROP: 2 SOLUTION/ DROPS OPHTHALMIC at 15:41

## 2022-01-01 RX ADMIN — Medication 2 TABLET: at 09:29

## 2022-01-01 RX ADMIN — INSULIN GLARGINE 32 UNITS: 100 INJECTION, SOLUTION SUBCUTANEOUS at 08:43

## 2022-01-01 RX ADMIN — INSULIN LISPRO 2 UNITS: 100 INJECTION, SOLUTION INTRAVENOUS; SUBCUTANEOUS at 06:21

## 2022-01-01 RX ADMIN — ATORVASTATIN CALCIUM 20 MG: 20 TABLET, FILM COATED ORAL at 21:53

## 2022-01-01 RX ADMIN — FERROUS SULFATE TAB 325 MG (65 MG ELEMENTAL FE) 325 MG: 325 (65 FE) TAB at 21:10

## 2022-01-01 RX ADMIN — ACETAMINOPHEN ORAL SOLUTION 650 MG: 650 SOLUTION ORAL at 05:55

## 2022-01-01 RX ADMIN — MINERAL SUPPLEMENT IRON 300 MG / 5 ML STRENGTH LIQUID 100 PER BOX UNFLAVORED 300 MG: at 21:00

## 2022-01-01 RX ADMIN — FLUCYTOSINE 500 MG: 500 CAPSULE ORAL at 12:10

## 2022-01-01 RX ADMIN — SODIUM CHLORIDE, PRESERVATIVE FREE 10 ML: 5 INJECTION INTRAVENOUS at 21:29

## 2022-01-01 RX ADMIN — NITROGLYCERIN 1 INCH: 20 OINTMENT TOPICAL at 13:51

## 2022-01-01 RX ADMIN — INSULIN LISPRO 1 UNITS: 100 INJECTION, SOLUTION INTRAVENOUS; SUBCUTANEOUS at 06:31

## 2022-01-01 RX ADMIN — SODIUM CHLORIDE, PRESERVATIVE FREE 10 ML: 5 INJECTION INTRAVENOUS at 15:24

## 2022-01-01 RX ADMIN — TRAMADOL HYDROCHLORIDE 50 MG: 50 TABLET, COATED ORAL at 14:07

## 2022-01-01 RX ADMIN — BRIMONIDINE TARTRATE 1 DROP: 2 SOLUTION/ DROPS OPHTHALMIC at 21:00

## 2022-01-01 RX ADMIN — PIPERACILLIN SODIUM AND TAZOBACTAM SODIUM 3.38 G: 3; .375 INJECTION, POWDER, LYOPHILIZED, FOR SOLUTION INTRAVENOUS at 01:21

## 2022-01-01 RX ADMIN — BRIMONIDINE TARTRATE 1 DROP: 2 SOLUTION/ DROPS OPHTHALMIC at 21:21

## 2022-01-01 RX ADMIN — SODIUM CHLORIDE 3 G: 900 INJECTION INTRAVENOUS at 05:44

## 2022-01-01 RX ADMIN — INSULIN LISPRO 9 UNITS: 100 INJECTION, SOLUTION INTRAVENOUS; SUBCUTANEOUS at 13:05

## 2022-01-01 RX ADMIN — SODIUM CHLORIDE 3 G: 900 INJECTION INTRAVENOUS at 06:28

## 2022-01-01 RX ADMIN — INSULIN LISPRO 4 UNITS: 100 INJECTION, SOLUTION INTRAVENOUS; SUBCUTANEOUS at 18:36

## 2022-01-01 RX ADMIN — HYDRALAZINE HYDROCHLORIDE 50 MG: 50 TABLET, FILM COATED ORAL at 10:11

## 2022-01-01 RX ADMIN — BRIMONIDINE TARTRATE 1 DROP: 2 SOLUTION OPHTHALMIC at 21:44

## 2022-01-01 RX ADMIN — HYDROMORPHONE HYDROCHLORIDE 8 MG: 2 TABLET ORAL at 20:47

## 2022-01-01 RX ADMIN — INSULIN GLARGINE 50 UNITS: 100 INJECTION, SOLUTION SUBCUTANEOUS at 21:51

## 2022-01-01 RX ADMIN — GABAPENTIN 300 MG: 300 CAPSULE ORAL at 10:07

## 2022-01-01 RX ADMIN — Medication 1 SPRAY: at 17:30

## 2022-01-01 RX ADMIN — ATORVASTATIN CALCIUM 20 MG: 20 TABLET, FILM COATED ORAL at 20:47

## 2022-01-01 RX ADMIN — INSULIN GLARGINE 10 UNITS: 100 INJECTION, SOLUTION SUBCUTANEOUS at 22:20

## 2022-01-01 RX ADMIN — HYDROMORPHONE HYDROCHLORIDE 1 MG: 1 INJECTION, SOLUTION INTRAMUSCULAR; INTRAVENOUS; SUBCUTANEOUS at 12:39

## 2022-01-01 RX ADMIN — HYDRALAZINE HYDROCHLORIDE 12.5 MG: 25 TABLET, FILM COATED ORAL at 22:01

## 2022-01-01 RX ADMIN — GABAPENTIN 300 MG: 300 CAPSULE ORAL at 21:16

## 2022-01-01 RX ADMIN — LORAZEPAM 1 MG: 2 INJECTION INTRAMUSCULAR at 12:48

## 2022-01-01 RX ADMIN — GABAPENTIN 300 MG: 300 CAPSULE ORAL at 10:14

## 2022-01-01 RX ADMIN — METOPROLOL TARTRATE 50 MG: 50 TABLET, FILM COATED ORAL at 23:26

## 2022-01-01 RX ADMIN — AMLODIPINE BESYLATE 5 MG: 5 TABLET ORAL at 11:07

## 2022-01-01 RX ADMIN — SODIUM CHLORIDE 50 ML/HR: 4.5 INJECTION, SOLUTION INTRAVENOUS at 12:29

## 2022-01-01 RX ADMIN — MINERAL SUPPLEMENT IRON 300 MG / 5 ML STRENGTH LIQUID 100 PER BOX UNFLAVORED 300 MG: at 09:00

## 2022-01-01 RX ADMIN — LABETALOL HYDROCHLORIDE 20 MG: 5 INJECTION, SOLUTION INTRAVENOUS at 22:06

## 2022-01-01 RX ADMIN — FUROSEMIDE 40 MG: 10 INJECTION, SOLUTION INTRAMUSCULAR; INTRAVENOUS at 10:54

## 2022-01-01 RX ADMIN — BRIMONIDINE TARTRATE 1 DROP: 2 SOLUTION OPHTHALMIC at 22:09

## 2022-01-01 RX ADMIN — METOPROLOL TARTRATE 50 MG: 50 TABLET, FILM COATED ORAL at 21:53

## 2022-01-01 RX ADMIN — FERROUS SULFATE TAB 325 MG (65 MG ELEMENTAL FE) 325 MG: 325 (65 FE) TAB at 10:07

## 2022-01-01 RX ADMIN — INSULIN LISPRO 9 UNITS: 100 INJECTION, SOLUTION INTRAVENOUS; SUBCUTANEOUS at 11:47

## 2022-01-01 RX ADMIN — CHOLESTYRAMINE 4 G: 4 POWDER, FOR SUSPENSION ORAL at 08:17

## 2022-01-01 RX ADMIN — HYDRALAZINE HYDROCHLORIDE 50 MG: 50 TABLET, FILM COATED ORAL at 09:45

## 2022-01-01 RX ADMIN — GLYCOPYRROLATE 0.1 MG: 0.2 INJECTION INTRAMUSCULAR; INTRAVENOUS at 21:15

## 2022-01-01 RX ADMIN — FAMOTIDINE 20 MG: 20 TABLET ORAL at 22:00

## 2022-01-01 RX ADMIN — ASPIRIN 81 MG CHEWABLE TABLET 81 MG: 81 TABLET CHEWABLE at 11:46

## 2022-01-01 RX ADMIN — FAMOTIDINE 20 MG: 20 TABLET ORAL at 09:20

## 2022-01-01 RX ADMIN — BRIMONIDINE TARTRATE 1 DROP: 2 SOLUTION OPHTHALMIC at 08:20

## 2022-01-01 RX ADMIN — GLYCOPYRROLATE 0.1 MG: 0.2 INJECTION INTRAMUSCULAR; INTRAVENOUS at 21:08

## 2022-01-01 RX ADMIN — BRIMONIDINE TARTRATE 1 DROP: 2 SOLUTION OPHTHALMIC at 09:34

## 2022-01-01 RX ADMIN — PSYLLIUM HUSK 1 PACKET: 3.4 POWDER ORAL at 22:05

## 2022-01-01 RX ADMIN — HEPARIN SODIUM 5000 UNITS: 5000 INJECTION INTRAVENOUS; SUBCUTANEOUS at 09:00

## 2022-01-01 RX ADMIN — ACETAMINOPHEN ORAL SOLUTION 650 MG: 650 SOLUTION ORAL at 10:36

## 2022-01-01 RX ADMIN — INSULIN LISPRO 12 UNITS: 100 INJECTION, SOLUTION INTRAVENOUS; SUBCUTANEOUS at 16:40

## 2022-01-01 RX ADMIN — INSULIN GLARGINE 32 UNITS: 100 INJECTION, SOLUTION SUBCUTANEOUS at 22:01

## 2022-01-01 RX ADMIN — INSULIN LISPRO 9 UNITS: 100 INJECTION, SOLUTION INTRAVENOUS; SUBCUTANEOUS at 12:21

## 2022-01-01 RX ADMIN — GABAPENTIN 300 MG: 300 CAPSULE ORAL at 01:40

## 2022-01-01 RX ADMIN — PHYTONADIONE 5 MG: 5 TABLET ORAL at 17:00

## 2022-01-01 RX ADMIN — PIPERACILLIN SODIUM AND TAZOBACTAM SODIUM 3.38 G: 3; .375 INJECTION, POWDER, LYOPHILIZED, FOR SOLUTION INTRAVENOUS at 09:45

## 2022-01-01 RX ADMIN — SODIUM CHLORIDE 3 G: 900 INJECTION INTRAVENOUS at 05:41

## 2022-01-01 RX ADMIN — ACETAMINOPHEN 650 MG: 650 SOLUTION ORAL at 11:25

## 2022-01-01 RX ADMIN — GABAPENTIN 300 MG: 300 CAPSULE ORAL at 22:50

## 2022-01-01 RX ADMIN — DICYCLOMINE HYDROCHLORIDE 10 MG: 10 CAPSULE ORAL at 22:09

## 2022-01-01 RX ADMIN — Medication 1500 MG: at 11:00

## 2022-01-01 RX ADMIN — INSULIN LISPRO 1 UNITS: 100 INJECTION, SOLUTION INTRAVENOUS; SUBCUTANEOUS at 18:21

## 2022-01-01 RX ADMIN — DEXTROSE MONOHYDRATE 100 ML/HR: 50 INJECTION, SOLUTION INTRAVENOUS at 15:25

## 2022-01-01 RX ADMIN — BRIMONIDINE TARTRATE 1 DROP: 2 SOLUTION OPHTHALMIC at 08:37

## 2022-01-01 RX ADMIN — SODIUM CHLORIDE, PRESERVATIVE FREE 10 ML: 5 INJECTION INTRAVENOUS at 13:54

## 2022-01-01 RX ADMIN — LATANOPROST 1 DROP: 50 SOLUTION OPHTHALMIC at 18:17

## 2022-01-01 RX ADMIN — MEROPENEM 1 G: 1 INJECTION, POWDER, FOR SOLUTION INTRAVENOUS at 05:21

## 2022-01-01 RX ADMIN — VANCOMYCIN HYDROCHLORIDE 500 MG: 500 INJECTION, POWDER, LYOPHILIZED, FOR SOLUTION INTRAVENOUS at 21:02

## 2022-01-01 RX ADMIN — NITROGLYCERIN 1 INCH: 20 OINTMENT TOPICAL at 05:52

## 2022-01-01 RX ADMIN — INSULIN LISPRO 9 UNITS: 100 INJECTION, SOLUTION INTRAVENOUS; SUBCUTANEOUS at 12:25

## 2022-01-01 RX ADMIN — Medication 1 MG: at 06:26

## 2022-01-01 RX ADMIN — CHOLESTYRAMINE 4 G: 4 POWDER, FOR SUSPENSION ORAL at 18:13

## 2022-01-01 RX ADMIN — FERROUS SULFATE TAB 325 MG (65 MG ELEMENTAL FE) 325 MG: 325 (65 FE) TAB at 23:12

## 2022-01-01 RX ADMIN — INSULIN LISPRO 6 UNITS: 100 INJECTION, SOLUTION INTRAVENOUS; SUBCUTANEOUS at 05:44

## 2022-01-01 RX ADMIN — Medication 2 TABLET: at 08:45

## 2022-01-01 RX ADMIN — HYDRALAZINE HYDROCHLORIDE 50 MG: 50 TABLET, FILM COATED ORAL at 16:13

## 2022-01-01 RX ADMIN — METOPROLOL TARTRATE 12.5 MG: 25 TABLET, FILM COATED ORAL at 11:14

## 2022-01-01 RX ADMIN — TRAMADOL HYDROCHLORIDE 25 MG: 50 TABLET, COATED ORAL at 09:39

## 2022-01-01 RX ADMIN — AMLODIPINE BESYLATE 5 MG: 5 TABLET ORAL at 10:15

## 2022-01-01 RX ADMIN — Medication 1 SPRAY: at 17:19

## 2022-01-01 RX ADMIN — INSULIN LISPRO 3 UNITS: 100 INJECTION, SOLUTION INTRAVENOUS; SUBCUTANEOUS at 23:57

## 2022-01-01 RX ADMIN — CHOLESTYRAMINE 4 G: 4 POWDER, FOR SUSPENSION ORAL at 18:05

## 2022-01-01 RX ADMIN — PROPOFOL 100 MG: 10 INJECTION, EMULSION INTRAVENOUS at 12:24

## 2022-01-01 RX ADMIN — INSULIN LISPRO 9 UNITS: 100 INJECTION, SOLUTION INTRAVENOUS; SUBCUTANEOUS at 18:15

## 2022-01-01 RX ADMIN — INSULIN GLARGINE 50 UNITS: 100 INJECTION, SOLUTION SUBCUTANEOUS at 10:26

## 2022-01-01 RX ADMIN — FERROUS SULFATE TAB 325 MG (65 MG ELEMENTAL FE) 325 MG: 325 (65 FE) TAB at 09:16

## 2022-01-01 RX ADMIN — FERROUS SULFATE TAB 325 MG (65 MG ELEMENTAL FE) 325 MG: 325 (65 FE) TAB at 21:25

## 2022-01-01 RX ADMIN — FAMOTIDINE 20 MG: 20 TABLET ORAL at 09:14

## 2022-01-01 RX ADMIN — CHOLESTYRAMINE 4 G: 4 POWDER, FOR SUSPENSION ORAL at 10:30

## 2022-01-01 RX ADMIN — BRIMONIDINE TARTRATE 1 DROP: 2 SOLUTION OPHTHALMIC at 10:43

## 2022-01-01 RX ADMIN — ENOXAPARIN SODIUM 40 MG: 100 INJECTION SUBCUTANEOUS at 09:51

## 2022-01-01 RX ADMIN — BRIMONIDINE TARTRATE 1 DROP: 2 SOLUTION/ DROPS OPHTHALMIC at 17:29

## 2022-01-01 RX ADMIN — SODIUM CHLORIDE 3 G: 900 INJECTION INTRAVENOUS at 20:27

## 2022-01-01 RX ADMIN — Medication 1 SPRAY: at 08:28

## 2022-01-01 RX ADMIN — BRIMONIDINE TARTRATE 1 DROP: 2 SOLUTION/ DROPS OPHTHALMIC at 21:36

## 2022-01-01 RX ADMIN — GLYCOPYRROLATE 0.1 MG: 0.2 INJECTION INTRAMUSCULAR; INTRAVENOUS at 16:18

## 2022-01-01 RX ADMIN — HYDRALAZINE HYDROCHLORIDE 12.5 MG: 25 TABLET, FILM COATED ORAL at 09:53

## 2022-01-01 RX ADMIN — INSULIN LISPRO 9 UNITS: 100 INJECTION, SOLUTION INTRAVENOUS; SUBCUTANEOUS at 15:22

## 2022-01-01 RX ADMIN — SACUBITRIL AND VALSARTAN 1 TABLET: 24; 26 TABLET, FILM COATED ORAL at 20:27

## 2022-01-01 RX ADMIN — HEPARIN SODIUM 5000 UNITS: 5000 INJECTION INTRAVENOUS; SUBCUTANEOUS at 23:59

## 2022-01-01 RX ADMIN — Medication 1 SPRAY: at 22:25

## 2022-01-01 RX ADMIN — INSULIN LISPRO 3 UNITS: 100 INJECTION, SOLUTION INTRAVENOUS; SUBCUTANEOUS at 20:04

## 2022-01-01 RX ADMIN — METOPROLOL SUCCINATE 50 MG: 50 TABLET, EXTENDED RELEASE ORAL at 10:36

## 2022-01-01 RX ADMIN — BRIMONIDINE TARTRATE 1 DROP: 2 SOLUTION OPHTHALMIC at 10:40

## 2022-01-01 RX ADMIN — GABAPENTIN 300 MG: 300 CAPSULE ORAL at 09:43

## 2022-01-01 RX ADMIN — FUROSEMIDE 40 MG: 10 INJECTION, SOLUTION INTRAMUSCULAR; INTRAVENOUS at 09:53

## 2022-01-01 RX ADMIN — FAMOTIDINE 20 MG: 20 TABLET ORAL at 11:15

## 2022-01-01 RX ADMIN — ENOXAPARIN SODIUM 40 MG: 100 INJECTION SUBCUTANEOUS at 08:30

## 2022-01-01 RX ADMIN — PIPERACILLIN SODIUM AND TAZOBACTAM SODIUM 3.38 G: 3; .375 INJECTION, POWDER, LYOPHILIZED, FOR SOLUTION INTRAVENOUS at 23:52

## 2022-01-01 RX ADMIN — SODIUM CHLORIDE, PRESERVATIVE FREE 10 ML: 5 INJECTION INTRAVENOUS at 05:42

## 2022-01-01 RX ADMIN — INSULIN LISPRO 4 UNITS: 100 INJECTION, SOLUTION INTRAVENOUS; SUBCUTANEOUS at 14:18

## 2022-01-01 RX ADMIN — INSULIN GLARGINE 32 UNITS: 100 INJECTION, SOLUTION SUBCUTANEOUS at 22:14

## 2022-01-01 RX ADMIN — CALCIUM POLYCARBOPHIL 625 MG: 625 TABLET, FILM COATED ORAL at 11:10

## 2022-01-01 RX ADMIN — HYOSCYAMINE SULFATE: 16 SOLUTION at 22:29

## 2022-01-01 RX ADMIN — ATORVASTATIN CALCIUM 20 MG: 20 TABLET, FILM COATED ORAL at 21:26

## 2022-01-01 RX ADMIN — FERROUS SULFATE TAB 325 MG (65 MG ELEMENTAL FE) 325 MG: 325 (65 FE) TAB at 10:54

## 2022-01-01 RX ADMIN — SODIUM CHLORIDE 1000 ML: 9 INJECTION, SOLUTION INTRAVENOUS at 23:04

## 2022-01-01 RX ADMIN — INSULIN LISPRO 1 UNITS: 100 INJECTION, SOLUTION INTRAVENOUS; SUBCUTANEOUS at 17:57

## 2022-01-01 RX ADMIN — FLUCYTOSINE 500 MG: 500 CAPSULE ORAL at 00:57

## 2022-01-01 RX ADMIN — HEPARIN SODIUM 5000 UNITS: 5000 INJECTION INTRAVENOUS; SUBCUTANEOUS at 08:31

## 2022-01-01 RX ADMIN — GLYCOPYRROLATE 0.4 MG: 0.2 INJECTION INTRAMUSCULAR; INTRAVENOUS at 09:10

## 2022-01-01 RX ADMIN — ALBUMIN (HUMAN) 25 G: 0.25 INJECTION, SOLUTION INTRAVENOUS at 06:18

## 2022-01-01 RX ADMIN — Medication 1 SPRAY: at 21:35

## 2022-01-01 RX ADMIN — LIDOCAINE HYDROCHLORIDE: 40 SOLUTION TOPICAL at 22:07

## 2022-01-01 RX ADMIN — GABAPENTIN 300 MG: 300 CAPSULE ORAL at 22:28

## 2022-01-01 RX ADMIN — FAMOTIDINE 20 MG: 20 TABLET ORAL at 22:19

## 2022-01-01 RX ADMIN — PROPOFOL 25 MCG/KG/MIN: 10 INJECTION, EMULSION INTRAVENOUS at 16:08

## 2022-01-01 RX ADMIN — PIPERACILLIN SODIUM AND TAZOBACTAM SODIUM 3.38 G: 3; .375 INJECTION, POWDER, LYOPHILIZED, FOR SOLUTION INTRAVENOUS at 22:51

## 2022-01-01 RX ADMIN — FUROSEMIDE 20 MG: 10 INJECTION, SOLUTION INTRAMUSCULAR; INTRAVENOUS at 09:19

## 2022-01-01 RX ADMIN — FERROUS SULFATE TAB 325 MG (65 MG ELEMENTAL FE) 325 MG: 325 (65 FE) TAB at 08:17

## 2022-01-01 RX ADMIN — FLUCYTOSINE 500 MG: 500 CAPSULE ORAL at 00:00

## 2022-01-01 RX ADMIN — ACETAMINOPHEN ORAL SOLUTION 650 MG: 650 SOLUTION ORAL at 08:47

## 2022-01-01 RX ADMIN — INSULIN LISPRO 3 UNITS: 100 INJECTION, SOLUTION INTRAVENOUS; SUBCUTANEOUS at 11:11

## 2022-01-01 RX ADMIN — ACETAMINOPHEN ORAL SOLUTION 650 MG: 650 SOLUTION ORAL at 13:29

## 2022-01-01 RX ADMIN — GABAPENTIN 300 MG: 300 CAPSULE ORAL at 09:16

## 2022-01-01 RX ADMIN — METOPROLOL TARTRATE 12.5 MG: 25 TABLET, FILM COATED ORAL at 22:17

## 2022-01-01 RX ADMIN — ENOXAPARIN SODIUM 40 MG: 100 INJECTION SUBCUTANEOUS at 10:08

## 2022-01-01 RX ADMIN — METOPROLOL TARTRATE 50 MG: 50 TABLET, FILM COATED ORAL at 20:47

## 2022-01-01 RX ADMIN — FUROSEMIDE 40 MG: 10 INJECTION, SOLUTION INTRAMUSCULAR; INTRAVENOUS at 11:22

## 2022-01-01 RX ADMIN — HYOSCYAMINE SULFATE: 16 SOLUTION at 22:35

## 2022-01-01 RX ADMIN — SODIUM BICARBONATE: 84 INJECTION, SOLUTION INTRAVENOUS at 11:47

## 2022-01-01 RX ADMIN — FUROSEMIDE 40 MG: 10 INJECTION, SOLUTION INTRAMUSCULAR; INTRAVENOUS at 08:44

## 2022-01-01 RX ADMIN — METOPROLOL SUCCINATE 50 MG: 50 TABLET, EXTENDED RELEASE ORAL at 09:19

## 2022-01-01 RX ADMIN — RIVAROXABAN 2.5 MG: 2.5 TABLET, FILM COATED ORAL at 12:42

## 2022-01-01 RX ADMIN — ACETAMINOPHEN 650 MG: 325 TABLET ORAL at 00:56

## 2022-01-01 RX ADMIN — ATORVASTATIN CALCIUM 20 MG: 20 TABLET, FILM COATED ORAL at 22:59

## 2022-01-01 RX ADMIN — GABAPENTIN 300 MG: 300 CAPSULE ORAL at 09:46

## 2022-01-01 RX ADMIN — MEROPENEM 1 G: 1 INJECTION, POWDER, FOR SOLUTION INTRAVENOUS at 21:13

## 2022-01-01 RX ADMIN — LORAZEPAM 1 MG: 2 INJECTION INTRAMUSCULAR at 00:12

## 2022-01-01 RX ADMIN — LABETALOL HYDROCHLORIDE 20 MG: 5 INJECTION, SOLUTION INTRAVENOUS at 21:58

## 2022-01-01 RX ADMIN — DIATRIZOATE MEGLUMINE AND DIATRIZOATE SODIUM 30 ML: 660; 100 LIQUID ORAL; RECTAL at 13:00

## 2022-01-01 RX ADMIN — Medication 2 TABLET: at 11:14

## 2022-01-01 RX ADMIN — FLUCYTOSINE 500 MG: 500 CAPSULE ORAL at 08:13

## 2022-01-01 RX ADMIN — GLYCOPYRROLATE 0.1 MG: 0.2 INJECTION INTRAMUSCULAR; INTRAVENOUS at 14:32

## 2022-01-01 RX ADMIN — GLYCOPYRROLATE 0.4 MG: 0.2 INJECTION INTRAMUSCULAR; INTRAVENOUS at 04:46

## 2022-01-01 RX ADMIN — SODIUM CHLORIDE 3 G: 900 INJECTION INTRAVENOUS at 06:31

## 2022-01-01 RX ADMIN — LATANOPROST 1 DROP: 50 SOLUTION OPHTHALMIC at 17:33

## 2022-01-01 RX ADMIN — HYDRALAZINE HYDROCHLORIDE 50 MG: 50 TABLET, FILM COATED ORAL at 20:33

## 2022-01-01 RX ADMIN — POTASSIUM CHLORIDE 40 MEQ: 1.5 POWDER, FOR SOLUTION ORAL at 11:48

## 2022-01-01 RX ADMIN — NITROGLYCERIN 1 INCH: 20 OINTMENT TOPICAL at 12:04

## 2022-01-01 RX ADMIN — INSULIN LISPRO 4 UNITS: 100 INJECTION, SOLUTION INTRAVENOUS; SUBCUTANEOUS at 12:00

## 2022-01-01 RX ADMIN — INSULIN LISPRO 2 UNITS: 100 INJECTION, SOLUTION INTRAVENOUS; SUBCUTANEOUS at 01:52

## 2022-01-01 RX ADMIN — Medication 1 SPRAY: at 16:47

## 2022-01-01 RX ADMIN — BRIMONIDINE TARTRATE 1 DROP: 2 SOLUTION OPHTHALMIC at 23:47

## 2022-01-01 RX ADMIN — CHOLESTYRAMINE 4 G: 4 POWDER, FOR SUSPENSION ORAL at 17:19

## 2022-01-01 RX ADMIN — CHOLESTYRAMINE 4 G: 4 POWDER, FOR SUSPENSION ORAL at 10:08

## 2022-01-01 RX ADMIN — PROPOFOL 20 MCG/KG/MIN: 10 INJECTION, EMULSION INTRAVENOUS at 09:20

## 2022-01-01 RX ADMIN — FUROSEMIDE 20 MG: 10 INJECTION, SOLUTION INTRAMUSCULAR; INTRAVENOUS at 11:50

## 2022-01-01 RX ADMIN — DEXTROSE MONOHYDRATE 75 ML/HR: 50 INJECTION, SOLUTION INTRAVENOUS at 05:18

## 2022-01-01 RX ADMIN — AMLODIPINE BESYLATE 5 MG: 5 TABLET ORAL at 11:45

## 2022-01-01 RX ADMIN — CALCIUM CHLORIDE 1 G: 100 INJECTION, SOLUTION INTRAVENOUS at 10:38

## 2022-01-01 RX ADMIN — SACUBITRIL AND VALSARTAN 1 TABLET: 24; 26 TABLET, FILM COATED ORAL at 08:00

## 2022-01-01 RX ADMIN — METOPROLOL TARTRATE 50 MG: 50 TABLET, FILM COATED ORAL at 20:39

## 2022-01-01 RX ADMIN — AMLODIPINE BESYLATE 5 MG: 5 TABLET ORAL at 09:32

## 2022-01-01 RX ADMIN — METOPROLOL TARTRATE 50 MG: 50 TABLET, FILM COATED ORAL at 20:53

## 2022-01-01 RX ADMIN — HYDRALAZINE HYDROCHLORIDE 20 MG: 20 INJECTION, SOLUTION INTRAMUSCULAR; INTRAVENOUS at 10:54

## 2022-01-01 RX ADMIN — INSULIN LISPRO 7 UNITS: 100 INJECTION, SOLUTION INTRAVENOUS; SUBCUTANEOUS at 17:12

## 2022-01-01 RX ADMIN — FUROSEMIDE 40 MG: 10 INJECTION, SOLUTION INTRAMUSCULAR; INTRAVENOUS at 08:09

## 2022-01-01 RX ADMIN — SODIUM CHLORIDE, PRESERVATIVE FREE 10 ML: 5 INJECTION INTRAVENOUS at 15:09

## 2022-01-01 RX ADMIN — INSULIN GLARGINE 32 UNITS: 100 INJECTION, SOLUTION SUBCUTANEOUS at 09:32

## 2022-01-01 RX ADMIN — GABAPENTIN 300 MG: 300 CAPSULE ORAL at 21:15

## 2022-01-01 RX ADMIN — INSULIN LISPRO 7 UNITS: 100 INJECTION, SOLUTION INTRAVENOUS; SUBCUTANEOUS at 17:23

## 2022-01-01 RX ADMIN — INSULIN GLARGINE 32 UNITS: 100 INJECTION, SOLUTION SUBCUTANEOUS at 08:31

## 2022-01-01 RX ADMIN — METOPROLOL TARTRATE 12.5 MG: 25 TABLET, FILM COATED ORAL at 22:49

## 2022-01-01 RX ADMIN — SODIUM CHLORIDE, PRESERVATIVE FREE 10 ML: 5 INJECTION INTRAVENOUS at 22:00

## 2022-01-01 RX ADMIN — TRAMADOL HYDROCHLORIDE 25 MG: 50 TABLET, COATED ORAL at 16:47

## 2022-01-01 RX ADMIN — INSULIN LISPRO 2 UNITS: 100 INJECTION, SOLUTION INTRAVENOUS; SUBCUTANEOUS at 11:28

## 2022-01-01 RX ADMIN — CHOLESTYRAMINE 4 G: 4 POWDER, FOR SUSPENSION ORAL at 08:06

## 2022-01-01 RX ADMIN — GABAPENTIN 300 MG: 300 CAPSULE ORAL at 09:47

## 2022-01-01 RX ADMIN — HYOSCYAMINE SULFATE: 16 SOLUTION at 08:06

## 2022-01-01 RX ADMIN — TRAMADOL HYDROCHLORIDE 25 MG: 50 TABLET, COATED ORAL at 12:34

## 2022-01-01 RX ADMIN — BRIMONIDINE TARTRATE 1 DROP: 2 SOLUTION OPHTHALMIC at 10:55

## 2022-01-01 RX ADMIN — INSULIN LISPRO 3 UNITS: 100 INJECTION, SOLUTION INTRAVENOUS; SUBCUTANEOUS at 00:12

## 2022-01-01 RX ADMIN — LATANOPROST 1 DROP: 50 SOLUTION OPHTHALMIC at 18:00

## 2022-01-01 RX ADMIN — INSULIN LISPRO 1 UNITS: 100 INJECTION, SOLUTION INTRAVENOUS; SUBCUTANEOUS at 11:46

## 2022-01-01 RX ADMIN — METOPROLOL TARTRATE 50 MG: 50 TABLET, FILM COATED ORAL at 21:26

## 2022-01-01 RX ADMIN — LIDOCAINE HYDROCHLORIDE 60 MG: 20 INJECTION, SOLUTION EPIDURAL; INFILTRATION; INTRACAUDAL; PERINEURAL at 18:15

## 2022-01-01 RX ADMIN — SACUBITRIL AND VALSARTAN 1 TABLET: 24; 26 TABLET, FILM COATED ORAL at 22:01

## 2022-01-01 RX ADMIN — LORAZEPAM 1 MG: 2 INJECTION INTRAMUSCULAR at 05:20

## 2022-01-01 RX ADMIN — INSULIN LISPRO 10 UNITS: 100 INJECTION, SOLUTION INTRAVENOUS; SUBCUTANEOUS at 08:36

## 2022-01-01 RX ADMIN — INSULIN LISPRO 10 UNITS: 100 INJECTION, SOLUTION INTRAVENOUS; SUBCUTANEOUS at 12:00

## 2022-01-01 RX ADMIN — SODIUM CHLORIDE, PRESERVATIVE FREE 10 ML: 5 INJECTION INTRAVENOUS at 21:08

## 2022-01-01 RX ADMIN — HEPARIN SODIUM 5000 UNITS: 5000 INJECTION INTRAVENOUS; SUBCUTANEOUS at 23:50

## 2022-01-01 RX ADMIN — LINEZOLID 600 MG: 600 TABLET, FILM COATED ORAL at 21:52

## 2022-01-01 RX ADMIN — GABAPENTIN 300 MG: 300 CAPSULE ORAL at 09:15

## 2022-01-01 RX ADMIN — HYDRALAZINE HYDROCHLORIDE 50 MG: 50 TABLET, FILM COATED ORAL at 21:16

## 2022-01-01 RX ADMIN — HYDRALAZINE HYDROCHLORIDE 12.5 MG: 25 TABLET, FILM COATED ORAL at 21:16

## 2022-01-01 RX ADMIN — VANCOMYCIN HYDROCHLORIDE 500 MG: 500 INJECTION, POWDER, LYOPHILIZED, FOR SOLUTION INTRAVENOUS at 16:31

## 2022-01-01 RX ADMIN — HEPARIN SODIUM 5000 UNITS: 5000 INJECTION INTRAVENOUS; SUBCUTANEOUS at 06:09

## 2022-01-01 RX ADMIN — ASPIRIN 81 MG: 81 TABLET, COATED ORAL at 10:23

## 2022-01-01 RX ADMIN — WHITE PETROLATUM,ZINC OXIDE: 20; 75 PASTE TOPICAL at 21:58

## 2022-01-01 RX ADMIN — VANCOMYCIN HYDROCHLORIDE 500 MG: 500 INJECTION, POWDER, LYOPHILIZED, FOR SOLUTION INTRAVENOUS at 10:52

## 2022-01-01 RX ADMIN — AMLODIPINE BESYLATE 5 MG: 5 TABLET ORAL at 09:06

## 2022-01-01 RX ADMIN — LATANOPROST 1 DROP: 50 SOLUTION OPHTHALMIC at 17:13

## 2022-01-01 RX ADMIN — BRIMONIDINE TARTRATE 1 DROP: 2 SOLUTION OPHTHALMIC at 17:19

## 2022-01-01 RX ADMIN — DEXAMETHASONE SODIUM PHOSPHATE 4 MG: 4 INJECTION, SOLUTION INTRA-ARTICULAR; INTRALESIONAL; INTRAMUSCULAR; INTRAVENOUS; SOFT TISSUE at 16:47

## 2022-01-01 RX ADMIN — HEPARIN SODIUM 5000 UNITS: 5000 INJECTION INTRAVENOUS; SUBCUTANEOUS at 15:34

## 2022-01-01 RX ADMIN — HEPARIN SODIUM 5000 UNITS: 5000 INJECTION INTRAVENOUS; SUBCUTANEOUS at 23:49

## 2022-01-01 RX ADMIN — BRIMONIDINE TARTRATE 1 DROP: 2 SOLUTION/ DROPS OPHTHALMIC at 08:52

## 2022-01-01 RX ADMIN — FERROUS SULFATE TAB 325 MG (65 MG ELEMENTAL FE) 325 MG: 325 (65 FE) TAB at 08:41

## 2022-01-01 RX ADMIN — METOPROLOL TARTRATE 12.5 MG: 25 TABLET, FILM COATED ORAL at 21:31

## 2022-01-01 RX ADMIN — SODIUM CHLORIDE 50 ML/HR: 4.5 INJECTION, SOLUTION INTRAVENOUS at 07:23

## 2022-01-01 RX ADMIN — GABAPENTIN 300 MG: 300 CAPSULE ORAL at 22:33

## 2022-01-01 RX ADMIN — GABAPENTIN 300 MG: 300 CAPSULE ORAL at 20:48

## 2022-01-01 RX ADMIN — HYDROMORPHONE HYDROCHLORIDE 1 MG: 1 INJECTION, SOLUTION INTRAMUSCULAR; INTRAVENOUS; SUBCUTANEOUS at 04:33

## 2022-01-01 RX ADMIN — GLYCOPYRROLATE 0.1 MG: 0.2 INJECTION INTRAMUSCULAR; INTRAVENOUS at 08:31

## 2022-01-01 RX ADMIN — CHOLESTYRAMINE 4 G: 4 POWDER, FOR SUSPENSION ORAL at 10:16

## 2022-01-01 RX ADMIN — RIVAROXABAN 2.5 MG: 2.5 TABLET, FILM COATED ORAL at 16:51

## 2022-01-01 RX ADMIN — FAMOTIDINE 20 MG: 20 TABLET ORAL at 08:03

## 2022-01-01 RX ADMIN — ACETAMINOPHEN 650 MG: 325 TABLET ORAL at 00:55

## 2022-01-01 RX ADMIN — FERROUS SULFATE TAB 325 MG (65 MG ELEMENTAL FE) 325 MG: 325 (65 FE) TAB at 22:10

## 2022-01-01 RX ADMIN — BRIMONIDINE TARTRATE 1 DROP: 2 SOLUTION OPHTHALMIC at 22:11

## 2022-01-01 RX ADMIN — ACETAMINOPHEN ORAL SOLUTION 650 MG: 650 SOLUTION ORAL at 09:44

## 2022-01-01 RX ADMIN — SODIUM CHLORIDE, PRESERVATIVE FREE 10 ML: 5 INJECTION INTRAVENOUS at 20:53

## 2022-01-01 RX ADMIN — GABAPENTIN 300 MG: 300 CAPSULE ORAL at 21:45

## 2022-01-01 RX ADMIN — SODIUM CHLORIDE, PRESERVATIVE FREE 10 ML: 5 INJECTION INTRAVENOUS at 06:00

## 2022-01-01 RX ADMIN — BRIMONIDINE TARTRATE 1 DROP: 2 SOLUTION OPHTHALMIC at 18:47

## 2022-01-01 RX ADMIN — VANCOMYCIN HYDROCHLORIDE 500 MG: 500 INJECTION, POWDER, LYOPHILIZED, FOR SOLUTION INTRAVENOUS at 17:29

## 2022-01-01 RX ADMIN — PIPERACILLIN SODIUM AND TAZOBACTAM SODIUM 3.38 G: 3; .375 INJECTION, POWDER, LYOPHILIZED, FOR SOLUTION INTRAVENOUS at 17:19

## 2022-01-01 RX ADMIN — GLYCOPYRROLATE 0.4 MG: 0.2 INJECTION INTRAMUSCULAR; INTRAVENOUS at 16:09

## 2022-01-01 RX ADMIN — INSULIN LISPRO 9 UNITS: 100 INJECTION, SOLUTION INTRAVENOUS; SUBCUTANEOUS at 01:44

## 2022-01-01 RX ADMIN — METOPROLOL SUCCINATE 50 MG: 50 TABLET, EXTENDED RELEASE ORAL at 10:24

## 2022-01-01 RX ADMIN — FUROSEMIDE 20 MG: 10 INJECTION, SOLUTION INTRAMUSCULAR; INTRAVENOUS at 20:56

## 2022-01-01 RX ADMIN — ATORVASTATIN CALCIUM 20 MG: 20 TABLET, FILM COATED ORAL at 20:11

## 2022-01-01 RX ADMIN — GABAPENTIN 300 MG: 300 CAPSULE ORAL at 21:28

## 2022-01-01 RX ADMIN — ASPIRIN 81 MG: 81 TABLET, COATED ORAL at 08:04

## 2022-01-01 RX ADMIN — BRIMONIDINE TARTRATE 1 DROP: 2 SOLUTION OPHTHALMIC at 17:02

## 2022-01-01 RX ADMIN — MINERAL SUPPLEMENT IRON 300 MG / 5 ML STRENGTH LIQUID 100 PER BOX UNFLAVORED 300 MG: at 08:56

## 2022-01-01 RX ADMIN — LORAZEPAM 1 MG: 2 INJECTION INTRAMUSCULAR at 17:39

## 2022-01-01 RX ADMIN — MINERAL SUPPLEMENT IRON 300 MG / 5 ML STRENGTH LIQUID 100 PER BOX UNFLAVORED 300 MG: at 21:58

## 2022-01-01 RX ADMIN — ASPIRIN 81 MG CHEWABLE TABLET 81 MG: 81 TABLET CHEWABLE at 10:33

## 2022-01-01 RX ADMIN — SODIUM CHLORIDE 3 G: 900 INJECTION INTRAVENOUS at 21:42

## 2022-01-01 RX ADMIN — CHOLESTYRAMINE 4 G: 4 POWDER, FOR SUSPENSION ORAL at 17:20

## 2022-01-01 RX ADMIN — SODIUM CHLORIDE 3 G: 900 INJECTION INTRAVENOUS at 14:52

## 2022-01-01 RX ADMIN — HYDRALAZINE HYDROCHLORIDE 12.5 MG: 25 TABLET, FILM COATED ORAL at 21:03

## 2022-01-01 RX ADMIN — SODIUM CHLORIDE, PRESERVATIVE FREE 10 ML: 5 INJECTION INTRAVENOUS at 21:44

## 2022-01-01 RX ADMIN — PROPOFOL 50 MG: 10 INJECTION, EMULSION INTRAVENOUS at 18:15

## 2022-01-01 RX ADMIN — FAMOTIDINE 20 MG: 20 TABLET ORAL at 21:26

## 2022-01-01 RX ADMIN — HYDRALAZINE HYDROCHLORIDE 50 MG: 50 TABLET, FILM COATED ORAL at 08:15

## 2022-01-01 RX ADMIN — SODIUM CHLORIDE 3 G: 900 INJECTION INTRAVENOUS at 13:45

## 2022-01-01 RX ADMIN — SODIUM CHLORIDE 3 G: 900 INJECTION INTRAVENOUS at 22:02

## 2022-01-01 RX ADMIN — SODIUM CHLORIDE, PRESERVATIVE FREE 10 ML: 5 INJECTION INTRAVENOUS at 04:34

## 2022-01-01 RX ADMIN — Medication 1 SPRAY: at 08:18

## 2022-01-01 RX ADMIN — INSULIN GLARGINE 50 UNITS: 100 INJECTION, SOLUTION SUBCUTANEOUS at 08:06

## 2022-01-01 RX ADMIN — FLUCONAZOLE 200 MG: 200 INJECTION, SOLUTION INTRAVENOUS at 10:28

## 2022-01-01 RX ADMIN — METOPROLOL TARTRATE 50 MG: 50 TABLET, FILM COATED ORAL at 08:15

## 2022-01-01 RX ADMIN — BRIMONIDINE TARTRATE 1 DROP: 2 SOLUTION OPHTHALMIC at 22:01

## 2022-01-01 RX ADMIN — GABAPENTIN 300 MG: 300 CAPSULE ORAL at 21:58

## 2022-01-01 RX ADMIN — BRIMONIDINE TARTRATE 1 DROP: 2 SOLUTION OPHTHALMIC at 17:41

## 2022-01-01 RX ADMIN — Medication 1 MG: at 05:08

## 2022-01-01 RX ADMIN — MINERAL SUPPLEMENT IRON 300 MG / 5 ML STRENGTH LIQUID 100 PER BOX UNFLAVORED 300 MG: at 10:23

## 2022-01-01 RX ADMIN — INSULIN GLARGINE 50 UNITS: 100 INJECTION, SOLUTION SUBCUTANEOUS at 11:43

## 2022-01-01 RX ADMIN — INSULIN LISPRO 3 UNITS: 100 INJECTION, SOLUTION INTRAVENOUS; SUBCUTANEOUS at 05:34

## 2022-01-01 RX ADMIN — INSULIN LISPRO 4 UNITS: 100 INJECTION, SOLUTION INTRAVENOUS; SUBCUTANEOUS at 18:45

## 2022-01-01 RX ADMIN — HEPARIN SODIUM 5000 UNITS: 5000 INJECTION INTRAVENOUS; SUBCUTANEOUS at 21:34

## 2022-01-01 RX ADMIN — INSULIN LISPRO 6 UNITS: 100 INJECTION, SOLUTION INTRAVENOUS; SUBCUTANEOUS at 16:57

## 2022-01-01 RX ADMIN — INSULIN LISPRO 5 UNITS: 100 INJECTION, SOLUTION INTRAVENOUS; SUBCUTANEOUS at 06:42

## 2022-01-01 RX ADMIN — SODIUM CHLORIDE, PRESERVATIVE FREE 10 ML: 5 INJECTION INTRAVENOUS at 15:06

## 2022-01-01 RX ADMIN — CEFTOLOZANE AND TAZOBACTAM 3 G: 1; .5 INJECTION, POWDER, LYOPHILIZED, FOR SOLUTION INTRAVENOUS at 06:05

## 2022-01-01 RX ADMIN — FAMOTIDINE 20 MG: 20 TABLET ORAL at 10:05

## 2022-01-01 RX ADMIN — INSULIN LISPRO 9 UNITS: 100 INJECTION, SOLUTION INTRAVENOUS; SUBCUTANEOUS at 07:00

## 2022-01-01 RX ADMIN — DICYCLOMINE HYDROCHLORIDE 10 MG: 10 CAPSULE ORAL at 17:36

## 2022-01-01 RX ADMIN — SODIUM CHLORIDE, PRESERVATIVE FREE 10 ML: 5 INJECTION INTRAVENOUS at 13:45

## 2022-01-01 RX ADMIN — INSULIN LISPRO 10 UNITS: 100 INJECTION, SOLUTION INTRAVENOUS; SUBCUTANEOUS at 14:11

## 2022-01-01 RX ADMIN — GLYCOPYRROLATE 0.4 MG: 0.2 INJECTION INTRAMUSCULAR; INTRAVENOUS at 23:44

## 2022-01-01 RX ADMIN — HYDRALAZINE HYDROCHLORIDE 12.5 MG: 25 TABLET, FILM COATED ORAL at 16:14

## 2022-01-01 RX ADMIN — GLYCOPYRROLATE 0.4 MG: 0.2 INJECTION INTRAMUSCULAR; INTRAVENOUS at 13:12

## 2022-01-01 RX ADMIN — HYDRALAZINE HYDROCHLORIDE 12.5 MG: 25 TABLET, FILM COATED ORAL at 09:39

## 2022-01-01 RX ADMIN — HYDRALAZINE HYDROCHLORIDE 12.5 MG: 25 TABLET, FILM COATED ORAL at 08:02

## 2022-01-01 RX ADMIN — INSULIN LISPRO 5 UNITS: 100 INJECTION, SOLUTION INTRAVENOUS; SUBCUTANEOUS at 01:03

## 2022-01-01 RX ADMIN — CHOLESTYRAMINE 4 G: 4 POWDER, FOR SUSPENSION ORAL at 08:03

## 2022-01-01 RX ADMIN — PIPERACILLIN SODIUM AND TAZOBACTAM SODIUM 3.38 G: 3; .375 INJECTION, POWDER, LYOPHILIZED, FOR SOLUTION INTRAVENOUS at 06:22

## 2022-01-01 RX ADMIN — FUROSEMIDE 40 MG: 10 INJECTION, SOLUTION INTRAMUSCULAR; INTRAVENOUS at 21:10

## 2022-01-01 RX ADMIN — PROPOFOL 25 MCG/KG/MIN: 10 INJECTION, EMULSION INTRAVENOUS at 11:48

## 2022-01-01 RX ADMIN — METOPROLOL TARTRATE 50 MG: 50 TABLET, FILM COATED ORAL at 10:12

## 2022-01-01 RX ADMIN — HYDROCODONE BITARTRATE AND ACETAMINOPHEN 1 TABLET: 5; 325 TABLET ORAL at 19:06

## 2022-01-01 RX ADMIN — INSULIN GLARGINE 32 UNITS: 100 INJECTION, SOLUTION SUBCUTANEOUS at 23:59

## 2022-01-01 RX ADMIN — SODIUM CHLORIDE, PRESERVATIVE FREE 10 ML: 5 INJECTION INTRAVENOUS at 05:55

## 2022-01-01 RX ADMIN — Medication 2 TABLET: at 09:53

## 2022-01-01 RX ADMIN — FENOFIBRATE 48 MG: 48 TABLET ORAL at 09:47

## 2022-01-01 RX ADMIN — AMLODIPINE BESYLATE 5 MG: 5 TABLET ORAL at 08:43

## 2022-01-01 RX ADMIN — FERROUS SULFATE TAB 325 MG (65 MG ELEMENTAL FE) 325 MG: 325 (65 FE) TAB at 20:55

## 2022-01-01 RX ADMIN — ACETAMINOPHEN 650 MG: 325 TABLET ORAL at 05:56

## 2022-01-01 RX ADMIN — ASPIRIN 81 MG CHEWABLE TABLET 81 MG: 81 TABLET CHEWABLE at 08:09

## 2022-01-01 RX ADMIN — METOPROLOL TARTRATE 12.5 MG: 25 TABLET, FILM COATED ORAL at 20:53

## 2022-01-01 RX ADMIN — GABAPENTIN 300 MG: 300 CAPSULE ORAL at 21:54

## 2022-01-01 RX ADMIN — HYDRALAZINE HYDROCHLORIDE 50 MG: 50 TABLET, FILM COATED ORAL at 18:00

## 2022-01-01 RX ADMIN — ROCURONIUM BROMIDE 30 MG: 50 INJECTION, SOLUTION INTRAVENOUS at 18:00

## 2022-01-01 RX ADMIN — SODIUM CHLORIDE, PRESERVATIVE FREE 10 ML: 5 INJECTION INTRAVENOUS at 05:46

## 2022-01-01 RX ADMIN — BRIMONIDINE TARTRATE 1 DROP: 2 SOLUTION/ DROPS OPHTHALMIC at 19:21

## 2022-01-01 RX ADMIN — ATORVASTATIN CALCIUM 20 MG: 20 TABLET, FILM COATED ORAL at 20:50

## 2022-01-01 RX ADMIN — NITROGLYCERIN 1 INCH: 20 OINTMENT TOPICAL at 00:13

## 2022-01-01 RX ADMIN — INSULIN LISPRO 9 UNITS: 100 INJECTION, SOLUTION INTRAVENOUS; SUBCUTANEOUS at 09:59

## 2022-01-01 RX ADMIN — FAMOTIDINE 20 MG: 20 TABLET ORAL at 08:06

## 2022-01-01 RX ADMIN — BRIMONIDINE TARTRATE 1 DROP: 2 SOLUTION/ DROPS OPHTHALMIC at 16:47

## 2022-01-01 RX ADMIN — CHOLESTYRAMINE 4 G: 4 POWDER, FOR SUSPENSION ORAL at 08:31

## 2022-01-01 RX ADMIN — HYDROMORPHONE HYDROCHLORIDE 1 MG: 1 INJECTION, SOLUTION INTRAMUSCULAR; INTRAVENOUS; SUBCUTANEOUS at 00:40

## 2022-01-01 RX ADMIN — FLUCONAZOLE 200 MG: 200 INJECTION, SOLUTION INTRAVENOUS at 09:52

## 2022-01-01 RX ADMIN — ATORVASTATIN CALCIUM 20 MG: 20 TABLET, FILM COATED ORAL at 20:57

## 2022-01-01 RX ADMIN — MINERAL SUPPLEMENT IRON 300 MG / 5 ML STRENGTH LIQUID 100 PER BOX UNFLAVORED 300 MG: at 22:28

## 2022-01-01 RX ADMIN — INSULIN LISPRO 3 UNITS: 100 INJECTION, SOLUTION INTRAVENOUS; SUBCUTANEOUS at 06:00

## 2022-01-01 RX ADMIN — SACUBITRIL AND VALSARTAN 1 TABLET: 24; 26 TABLET, FILM COATED ORAL at 14:50

## 2022-01-01 RX ADMIN — BRIMONIDINE TARTRATE 1 DROP: 2 SOLUTION/ DROPS OPHTHALMIC at 08:07

## 2022-01-01 RX ADMIN — SACUBITRIL AND VALSARTAN 1 TABLET: 24; 26 TABLET, FILM COATED ORAL at 08:45

## 2022-01-01 RX ADMIN — FAMOTIDINE 20 MG: 20 TABLET ORAL at 08:41

## 2022-01-01 RX ADMIN — HYDRALAZINE HYDROCHLORIDE 50 MG: 50 TABLET, FILM COATED ORAL at 10:42

## 2022-01-01 RX ADMIN — BRIMONIDINE TARTRATE 1 DROP: 2 SOLUTION OPHTHALMIC at 18:00

## 2022-01-01 RX ADMIN — GLYCOPYRROLATE 0.1 MG: 0.2 INJECTION INTRAMUSCULAR; INTRAVENOUS at 14:27

## 2022-01-01 RX ADMIN — AMLODIPINE BESYLATE 5 MG: 5 TABLET ORAL at 10:43

## 2022-01-01 RX ADMIN — SODIUM CHLORIDE, PRESERVATIVE FREE 10 ML: 5 INJECTION INTRAVENOUS at 05:21

## 2022-01-01 RX ADMIN — LORAZEPAM 1 MG: 2 INJECTION INTRAMUSCULAR at 04:32

## 2022-01-01 RX ADMIN — SODIUM CHLORIDE, PRESERVATIVE FREE 10 ML: 5 INJECTION INTRAVENOUS at 21:00

## 2022-01-01 RX ADMIN — FLUCYTOSINE 500 MG: 500 CAPSULE ORAL at 20:32

## 2022-01-01 RX ADMIN — PROPOFOL 100 MG: 10 INJECTION, EMULSION INTRAVENOUS at 16:44

## 2022-01-01 RX ADMIN — GLYCOPYRROLATE 0.4 MG: 0.2 INJECTION INTRAMUSCULAR; INTRAVENOUS at 05:07

## 2022-01-01 RX ADMIN — FUROSEMIDE 20 MG: 10 INJECTION, SOLUTION INTRAMUSCULAR; INTRAVENOUS at 22:53

## 2022-01-01 RX ADMIN — LORAZEPAM 1 MG: 2 INJECTION INTRAMUSCULAR at 14:14

## 2022-01-01 RX ADMIN — MEROPENEM 1 G: 1 INJECTION, POWDER, FOR SOLUTION INTRAVENOUS at 13:06

## 2022-01-01 RX ADMIN — ENOXAPARIN SODIUM 40 MG: 100 INJECTION SUBCUTANEOUS at 08:46

## 2022-01-01 RX ADMIN — SODIUM CHLORIDE, PRESERVATIVE FREE 10 ML: 5 INJECTION INTRAVENOUS at 17:25

## 2022-01-01 RX ADMIN — HYDRALAZINE HYDROCHLORIDE 12.5 MG: 25 TABLET, FILM COATED ORAL at 16:21

## 2022-01-01 RX ADMIN — SODIUM CHLORIDE, PRESERVATIVE FREE 10 ML: 5 INJECTION INTRAVENOUS at 21:39

## 2022-01-01 RX ADMIN — SODIUM CHLORIDE 3 G: 900 INJECTION INTRAVENOUS at 15:19

## 2022-01-01 RX ADMIN — INSULIN GLARGINE 25 UNITS: 100 INJECTION, SOLUTION SUBCUTANEOUS at 22:03

## 2022-01-01 RX ADMIN — FUROSEMIDE 40 MG: 10 INJECTION, SOLUTION INTRAMUSCULAR; INTRAVENOUS at 22:00

## 2022-01-01 RX ADMIN — INSULIN GLARGINE 50 UNITS: 100 INJECTION, SOLUTION SUBCUTANEOUS at 09:22

## 2022-01-01 RX ADMIN — DEXTROSE MONOHYDRATE 250 ML: 100 INJECTION, SOLUTION INTRAVENOUS at 12:17

## 2022-01-01 RX ADMIN — HYDROMORPHONE HYDROCHLORIDE 1 MG: 1 INJECTION, SOLUTION INTRAMUSCULAR; INTRAVENOUS; SUBCUTANEOUS at 00:11

## 2022-01-01 RX ADMIN — FUROSEMIDE 20 MG: 10 INJECTION, SOLUTION INTRAMUSCULAR; INTRAVENOUS at 10:08

## 2022-01-01 RX ADMIN — IPRATROPIUM BROMIDE AND ALBUTEROL SULFATE 3 ML: .5; 2.5 SOLUTION RESPIRATORY (INHALATION) at 13:36

## 2022-01-01 RX ADMIN — PIPERACILLIN SODIUM AND TAZOBACTAM SODIUM 3.38 G: 3; .375 INJECTION, POWDER, LYOPHILIZED, FOR SOLUTION INTRAVENOUS at 16:00

## 2022-01-01 RX ADMIN — AMLODIPINE BESYLATE 10 MG: 5 TABLET ORAL at 08:00

## 2022-01-01 RX ADMIN — LATANOPROST 1 DROP: 50 SOLUTION OPHTHALMIC at 17:57

## 2022-01-01 RX ADMIN — FERROUS SULFATE TAB 325 MG (65 MG ELEMENTAL FE) 325 MG: 325 (65 FE) TAB at 21:16

## 2022-01-01 RX ADMIN — HYDROMORPHONE HYDROCHLORIDE 1 MG: 1 INJECTION, SOLUTION INTRAMUSCULAR; INTRAVENOUS; SUBCUTANEOUS at 20:25

## 2022-01-01 RX ADMIN — MEROPENEM 1 G: 1 INJECTION, POWDER, FOR SOLUTION INTRAVENOUS at 10:23

## 2022-01-01 RX ADMIN — CLOPIDOGREL BISULFATE 75 MG: 75 TABLET, FILM COATED ORAL at 11:32

## 2022-01-01 RX ADMIN — GABAPENTIN 300 MG: 300 CAPSULE ORAL at 21:26

## 2022-01-01 RX ADMIN — ASPIRIN 81 MG: 81 TABLET, COATED ORAL at 08:19

## 2022-01-01 RX ADMIN — GLYCOPYRROLATE 0.4 MG: 0.2 INJECTION INTRAMUSCULAR; INTRAVENOUS at 04:32

## 2022-01-01 RX ADMIN — BRIMONIDINE TARTRATE 1 DROP: 2 SOLUTION/ DROPS OPHTHALMIC at 21:25

## 2022-01-01 RX ADMIN — INSULIN LISPRO 2 UNITS: 100 INJECTION, SOLUTION INTRAVENOUS; SUBCUTANEOUS at 23:17

## 2022-01-01 RX ADMIN — ENOXAPARIN SODIUM 40 MG: 100 INJECTION SUBCUTANEOUS at 08:58

## 2022-01-01 RX ADMIN — GABAPENTIN 300 MG: 300 CAPSULE ORAL at 20:50

## 2022-01-01 RX ADMIN — HYDRALAZINE HYDROCHLORIDE 25 MG: 25 TABLET, FILM COATED ORAL at 17:20

## 2022-01-01 RX ADMIN — PSYLLIUM HUSK 1 PACKET: 3.4 POWDER ORAL at 21:56

## 2022-01-01 RX ADMIN — BRIMONIDINE TARTRATE 1 DROP: 2 SOLUTION/ DROPS OPHTHALMIC at 17:09

## 2022-01-01 RX ADMIN — ASPIRIN 81 MG CHEWABLE TABLET 81 MG: 81 TABLET CHEWABLE at 08:31

## 2022-01-01 RX ADMIN — MINERAL SUPPLEMENT IRON 300 MG / 5 ML STRENGTH LIQUID 100 PER BOX UNFLAVORED 300 MG: at 10:01

## 2022-01-01 RX ADMIN — CHOLESTYRAMINE 4 G: 4 POWDER, FOR SUSPENSION ORAL at 16:23

## 2022-01-01 RX ADMIN — VANCOMYCIN HYDROCHLORIDE 500 MG: 500 INJECTION, POWDER, LYOPHILIZED, FOR SOLUTION INTRAVENOUS at 04:00

## 2022-01-01 RX ADMIN — HYDROMORPHONE HYDROCHLORIDE 1 MG: 1 INJECTION, SOLUTION INTRAMUSCULAR; INTRAVENOUS; SUBCUTANEOUS at 13:12

## 2022-01-01 RX ADMIN — SODIUM CHLORIDE, PRESERVATIVE FREE 10 ML: 5 INJECTION INTRAVENOUS at 06:03

## 2022-01-01 RX ADMIN — SODIUM CHLORIDE 3 G: 900 INJECTION INTRAVENOUS at 13:50

## 2022-01-01 RX ADMIN — INSULIN LISPRO 3 UNITS: 100 INJECTION, SOLUTION INTRAVENOUS; SUBCUTANEOUS at 00:04

## 2022-01-01 RX ADMIN — VANCOMYCIN HYDROCHLORIDE 750 MG: 750 INJECTION, POWDER, LYOPHILIZED, FOR SOLUTION INTRAVENOUS at 16:00

## 2022-01-01 RX ADMIN — Medication 2 TABLET: at 08:31

## 2022-01-01 RX ADMIN — FERROUS SULFATE TAB 325 MG (65 MG ELEMENTAL FE) 325 MG: 325 (65 FE) TAB at 21:26

## 2022-01-01 RX ADMIN — HEPARIN SODIUM 5000 UNITS: 5000 INJECTION INTRAVENOUS; SUBCUTANEOUS at 15:47

## 2022-01-01 RX ADMIN — INSULIN LISPRO 7 UNITS: 100 INJECTION, SOLUTION INTRAVENOUS; SUBCUTANEOUS at 17:30

## 2022-01-01 RX ADMIN — FLUCYTOSINE 500 MG: 500 CAPSULE ORAL at 08:44

## 2022-01-01 RX ADMIN — HEPARIN SODIUM 5000 UNITS: 5000 INJECTION INTRAVENOUS; SUBCUTANEOUS at 18:02

## 2022-01-01 RX ADMIN — Medication 1 SPRAY: at 17:43

## 2022-01-01 RX ADMIN — LIDOCAINE HYDROCHLORIDE 40 MG: 20 INJECTION, SOLUTION EPIDURAL; INFILTRATION; INTRACAUDAL; PERINEURAL at 16:44

## 2022-01-01 RX ADMIN — MINERAL SUPPLEMENT IRON 300 MG / 5 ML STRENGTH LIQUID 100 PER BOX UNFLAVORED 300 MG: at 09:59

## 2022-01-01 RX ADMIN — MEROPENEM 1 G: 1 INJECTION, POWDER, FOR SOLUTION INTRAVENOUS at 23:54

## 2022-01-01 RX ADMIN — GABAPENTIN 300 MG: 300 CAPSULE ORAL at 10:23

## 2022-01-01 RX ADMIN — INSULIN LISPRO 6 UNITS: 100 INJECTION, SOLUTION INTRAVENOUS; SUBCUTANEOUS at 16:18

## 2022-01-01 RX ADMIN — FERROUS SULFATE TAB 325 MG (65 MG ELEMENTAL FE) 325 MG: 325 (65 FE) TAB at 21:54

## 2022-01-01 RX ADMIN — BRIMONIDINE TARTRATE 1 DROP: 2 SOLUTION OPHTHALMIC at 17:31

## 2022-01-01 RX ADMIN — INSULIN LISPRO 2 UNITS: 100 INJECTION, SOLUTION INTRAVENOUS; SUBCUTANEOUS at 17:43

## 2022-01-01 RX ADMIN — PIPERACILLIN AND TAZOBACTAM 3.38 G: 3; .375 INJECTION, POWDER, LYOPHILIZED, FOR SOLUTION INTRAVENOUS at 04:31

## 2022-01-01 RX ADMIN — ASPIRIN 81 MG CHEWABLE TABLET 81 MG: 81 TABLET CHEWABLE at 09:39

## 2022-01-01 RX ADMIN — IPRATROPIUM BROMIDE AND ALBUTEROL SULFATE 3 ML: .5; 3 SOLUTION RESPIRATORY (INHALATION) at 20:53

## 2022-01-01 RX ADMIN — HYDRALAZINE HYDROCHLORIDE 50 MG: 50 TABLET, FILM COATED ORAL at 22:00

## 2022-01-01 RX ADMIN — HYDRALAZINE HYDROCHLORIDE 50 MG: 50 TABLET, FILM COATED ORAL at 21:26

## 2022-01-01 RX ADMIN — SODIUM CHLORIDE, PRESERVATIVE FREE 10 ML: 5 INJECTION INTRAVENOUS at 05:00

## 2022-01-01 RX ADMIN — GABAPENTIN 300 MG: 300 CAPSULE ORAL at 20:47

## 2022-01-01 RX ADMIN — SODIUM CHLORIDE, PRESERVATIVE FREE 10 ML: 5 INJECTION INTRAVENOUS at 05:49

## 2022-01-01 RX ADMIN — CIPROFLOXACIN 750 MG: 500 TABLET, FILM COATED ORAL at 08:48

## 2022-01-01 RX ADMIN — GABAPENTIN 300 MG: 300 CAPSULE ORAL at 20:56

## 2022-01-01 RX ADMIN — METOPROLOL TARTRATE 12.5 MG: 25 TABLET, FILM COATED ORAL at 20:47

## 2022-01-01 RX ADMIN — HYDROMORPHONE HYDROCHLORIDE 8 MG: 2 TABLET ORAL at 01:11

## 2022-01-01 RX ADMIN — MEROPENEM 1 G: 1 INJECTION, POWDER, FOR SOLUTION INTRAVENOUS at 16:00

## 2022-01-01 RX ADMIN — TRAMADOL HYDROCHLORIDE 50 MG: 50 TABLET, COATED ORAL at 06:15

## 2022-01-01 RX ADMIN — INSULIN GLARGINE 32 UNITS: 100 INJECTION, SOLUTION SUBCUTANEOUS at 08:48

## 2022-01-01 RX ADMIN — FAMOTIDINE 20 MG: 20 TABLET ORAL at 08:19

## 2022-01-01 RX ADMIN — GABAPENTIN 300 MG: 300 CAPSULE ORAL at 22:05

## 2022-01-01 RX ADMIN — AMLODIPINE BESYLATE 5 MG: 5 TABLET ORAL at 17:42

## 2022-01-01 RX ADMIN — BRIMONIDINE TARTRATE 1 DROP: 2 SOLUTION/ DROPS OPHTHALMIC at 09:47

## 2022-01-01 RX ADMIN — PIPERACILLIN SODIUM AND TAZOBACTAM SODIUM 3.38 G: 3; .375 INJECTION, POWDER, LYOPHILIZED, FOR SOLUTION INTRAVENOUS at 09:32

## 2022-01-01 RX ADMIN — BRIMONIDINE TARTRATE 1 DROP: 2 SOLUTION OPHTHALMIC at 21:22

## 2022-01-01 RX ADMIN — Medication 1 SPRAY: at 20:29

## 2022-01-01 RX ADMIN — INSULIN LISPRO 9 UNITS: 100 INJECTION, SOLUTION INTRAVENOUS; SUBCUTANEOUS at 18:32

## 2022-01-01 RX ADMIN — HEPARIN SODIUM 5000 UNITS: 5000 INJECTION INTRAVENOUS; SUBCUTANEOUS at 14:00

## 2022-01-01 RX ADMIN — FERROUS SULFATE TAB 325 MG (65 MG ELEMENTAL FE) 325 MG: 325 (65 FE) TAB at 22:03

## 2022-01-01 RX ADMIN — INSULIN GLARGINE 10 UNITS: 100 INJECTION, SOLUTION SUBCUTANEOUS at 23:01

## 2022-01-01 RX ADMIN — FUROSEMIDE 40 MG: 10 INJECTION, SOLUTION INTRAMUSCULAR; INTRAVENOUS at 20:48

## 2022-01-01 RX ADMIN — LATANOPROST 1 DROP: 50 SOLUTION OPHTHALMIC at 18:26

## 2022-01-01 RX ADMIN — SODIUM CHLORIDE 3 G: 900 INJECTION INTRAVENOUS at 22:11

## 2022-01-01 RX ADMIN — HYDRALAZINE HYDROCHLORIDE 50 MG: 50 TABLET, FILM COATED ORAL at 20:53

## 2022-01-01 RX ADMIN — INSULIN LISPRO 3 UNITS: 100 INJECTION, SOLUTION INTRAVENOUS; SUBCUTANEOUS at 23:17

## 2022-01-01 RX ADMIN — CHOLESTYRAMINE 4 G: 4 POWDER, FOR SUSPENSION ORAL at 16:07

## 2022-01-01 RX ADMIN — PROPOFOL 25 MCG/KG/MIN: 10 INJECTION, EMULSION INTRAVENOUS at 21:26

## 2022-01-01 RX ADMIN — MEROPENEM 1 G: 1 INJECTION, POWDER, FOR SOLUTION INTRAVENOUS at 17:15

## 2022-01-01 RX ADMIN — SODIUM CHLORIDE 25 ML/HR: 4.5 INJECTION, SOLUTION INTRAVENOUS at 17:17

## 2022-01-01 RX ADMIN — HYDROMORPHONE HYDROCHLORIDE 1 MG: 1 INJECTION, SOLUTION INTRAMUSCULAR; INTRAVENOUS; SUBCUTANEOUS at 10:03

## 2022-01-01 RX ADMIN — LATANOPROST 1 DROP: 50 SOLUTION OPHTHALMIC at 19:05

## 2022-01-01 RX ADMIN — LORAZEPAM 1 MG: 2 INJECTION INTRAMUSCULAR at 13:17

## 2022-01-01 RX ADMIN — METOPROLOL TARTRATE 12.5 MG: 25 TABLET, FILM COATED ORAL at 08:46

## 2022-01-01 RX ADMIN — INSULIN LISPRO 3 UNITS: 100 INJECTION, SOLUTION INTRAVENOUS; SUBCUTANEOUS at 05:52

## 2022-01-01 RX ADMIN — ATORVASTATIN CALCIUM 20 MG: 20 TABLET, FILM COATED ORAL at 22:46

## 2022-01-01 RX ADMIN — ASPIRIN 81 MG: 81 TABLET, COATED ORAL at 08:31

## 2022-01-01 RX ADMIN — HYDRALAZINE HYDROCHLORIDE 50 MG: 50 TABLET, FILM COATED ORAL at 21:53

## 2022-01-01 RX ADMIN — CHOLESTYRAMINE 4 G: 4 POWDER, FOR SUSPENSION ORAL at 16:09

## 2022-01-01 RX ADMIN — METOPROLOL TARTRATE 50 MG: 50 TABLET, FILM COATED ORAL at 00:40

## 2022-01-01 RX ADMIN — BRIMONIDINE TARTRATE 1 DROP: 2 SOLUTION OPHTHALMIC at 08:14

## 2022-01-01 RX ADMIN — INSULIN LISPRO 3 UNITS: 100 INJECTION, SOLUTION INTRAVENOUS; SUBCUTANEOUS at 18:00

## 2022-01-01 RX ADMIN — INSULIN GLARGINE 32 UNITS: 100 INJECTION, SOLUTION SUBCUTANEOUS at 20:39

## 2022-01-01 RX ADMIN — FAMOTIDINE 20 MG: 20 TABLET ORAL at 09:53

## 2022-01-01 RX ADMIN — HYDRALAZINE HYDROCHLORIDE 50 MG: 50 TABLET, FILM COATED ORAL at 23:43

## 2022-01-01 RX ADMIN — BRIMONIDINE TARTRATE 1 DROP: 2 SOLUTION/ DROPS OPHTHALMIC at 18:04

## 2022-01-01 RX ADMIN — CHOLESTYRAMINE 4 G: 4 POWDER, FOR SUSPENSION ORAL at 09:48

## 2022-01-01 RX ADMIN — SODIUM CHLORIDE, PRESERVATIVE FREE 10 ML: 5 INJECTION INTRAVENOUS at 06:21

## 2022-01-01 RX ADMIN — NITROGLYCERIN 1 INCH: 20 OINTMENT TOPICAL at 12:30

## 2022-01-01 RX ADMIN — ACETAMINOPHEN ORAL SOLUTION 650 MG: 650 SOLUTION ORAL at 21:58

## 2022-01-01 RX ADMIN — AMLODIPINE BESYLATE 5 MG: 5 TABLET ORAL at 10:11

## 2022-01-01 RX ADMIN — SODIUM CHLORIDE, PRESERVATIVE FREE 10 ML: 5 INJECTION INTRAVENOUS at 22:09

## 2022-01-01 RX ADMIN — INSULIN LISPRO 4 UNITS: 100 INJECTION, SOLUTION INTRAVENOUS; SUBCUTANEOUS at 12:17

## 2022-01-01 RX ADMIN — PROPOFOL 25 MCG/KG/MIN: 10 INJECTION, EMULSION INTRAVENOUS at 03:15

## 2022-01-01 RX ADMIN — METHYLPREDNISOLONE SODIUM SUCCINATE 40 MG: 40 INJECTION, POWDER, FOR SOLUTION INTRAMUSCULAR; INTRAVENOUS at 11:01

## 2022-01-01 RX ADMIN — INSULIN LISPRO 3 UNITS: 100 INJECTION, SOLUTION INTRAVENOUS; SUBCUTANEOUS at 03:22

## 2022-01-01 RX ADMIN — MINERAL SUPPLEMENT IRON 300 MG / 5 ML STRENGTH LIQUID 100 PER BOX UNFLAVORED 300 MG: at 08:27

## 2022-01-01 RX ADMIN — Medication 1 SPRAY: at 21:39

## 2022-01-01 RX ADMIN — GLYCOPYRROLATE 0.4 MG: 0.2 INJECTION INTRAMUSCULAR; INTRAVENOUS at 01:34

## 2022-01-01 RX ADMIN — VASOPRESSIN 2 UNITS: 20 INJECTION INTRAVENOUS at 10:41

## 2022-01-01 RX ADMIN — INSULIN LISPRO 2 UNITS: 100 INJECTION, SOLUTION INTRAVENOUS; SUBCUTANEOUS at 00:22

## 2022-01-01 RX ADMIN — ACETAMINOPHEN 975 MG: 650 SUPPOSITORY RECTAL at 00:23

## 2022-01-01 RX ADMIN — BRIMONIDINE TARTRATE 1 DROP: 2 SOLUTION OPHTHALMIC at 23:05

## 2022-01-01 RX ADMIN — MINERAL SUPPLEMENT IRON 300 MG / 5 ML STRENGTH LIQUID 100 PER BOX UNFLAVORED 300 MG: at 16:13

## 2022-01-01 RX ADMIN — CHOLESTYRAMINE 4 G: 4 POWDER, FOR SUSPENSION ORAL at 17:43

## 2022-01-01 RX ADMIN — SODIUM CHLORIDE, PRESERVATIVE FREE 10 ML: 5 INJECTION INTRAVENOUS at 15:20

## 2022-01-01 RX ADMIN — Medication 2 TABLET: at 09:12

## 2022-01-01 RX ADMIN — INSULIN LISPRO 9 UNITS: 100 INJECTION, SOLUTION INTRAVENOUS; SUBCUTANEOUS at 16:15

## 2022-01-01 RX ADMIN — INSULIN GLARGINE 32 UNITS: 100 INJECTION, SOLUTION SUBCUTANEOUS at 20:54

## 2022-01-01 RX ADMIN — ATORVASTATIN CALCIUM 20 MG: 20 TABLET, FILM COATED ORAL at 21:34

## 2022-01-01 RX ADMIN — MINERAL SUPPLEMENT IRON 300 MG / 5 ML STRENGTH LIQUID 100 PER BOX UNFLAVORED 300 MG: at 10:26

## 2022-01-01 RX ADMIN — FLUCONAZOLE 200 MG: 2 INJECTION, SOLUTION INTRAVENOUS at 10:53

## 2022-01-01 RX ADMIN — CHOLESTYRAMINE 4 G: 4 POWDER, FOR SUSPENSION ORAL at 09:19

## 2022-01-01 RX ADMIN — FERROUS SULFATE TAB 325 MG (65 MG ELEMENTAL FE) 325 MG: 325 (65 FE) TAB at 10:06

## 2022-01-01 RX ADMIN — HYDROMORPHONE HYDROCHLORIDE 1 MG: 1 INJECTION, SOLUTION INTRAMUSCULAR; INTRAVENOUS; SUBCUTANEOUS at 17:33

## 2022-01-01 RX ADMIN — GABAPENTIN 300 MG: 300 CAPSULE ORAL at 10:32

## 2022-01-01 RX ADMIN — PHENYLEPHRINE HYDROCHLORIDE 200 MCG: 10 INJECTION INTRAVENOUS at 10:32

## 2022-01-01 RX ADMIN — ACETAMINOPHEN 650 MG: 325 TABLET ORAL at 14:54

## 2022-01-01 RX ADMIN — MINERAL SUPPLEMENT IRON 300 MG / 5 ML STRENGTH LIQUID 100 PER BOX UNFLAVORED 300 MG: at 21:57

## 2022-01-01 RX ADMIN — METOPROLOL SUCCINATE 50 MG: 50 TABLET, EXTENDED RELEASE ORAL at 20:56

## 2022-01-01 RX ADMIN — INSULIN GLARGINE 10 UNITS: 100 INJECTION, SOLUTION SUBCUTANEOUS at 23:16

## 2022-01-01 RX ADMIN — Medication 2 TABLET: at 11:16

## 2022-01-01 RX ADMIN — SACUBITRIL AND VALSARTAN 1 TABLET: 24; 26 TABLET, FILM COATED ORAL at 22:05

## 2022-01-01 RX ADMIN — SODIUM CHLORIDE, PRESERVATIVE FREE 10 ML: 5 INJECTION INTRAVENOUS at 13:33

## 2022-01-01 RX ADMIN — LATANOPROST 1 DROP: 50 SOLUTION OPHTHALMIC at 17:18

## 2022-01-01 RX ADMIN — VANCOMYCIN HYDROCHLORIDE 500 MG: 500 INJECTION, POWDER, LYOPHILIZED, FOR SOLUTION INTRAVENOUS at 10:56

## 2022-01-01 RX ADMIN — BRIMONIDINE TARTRATE 1 DROP: 2 SOLUTION/ DROPS OPHTHALMIC at 10:22

## 2022-01-01 RX ADMIN — Medication 2 TABLET: at 08:09

## 2022-01-01 RX ADMIN — INSULIN LISPRO 1 UNITS: 100 INJECTION, SOLUTION INTRAVENOUS; SUBCUTANEOUS at 23:26

## 2022-01-01 RX ADMIN — HEPARIN SODIUM 5000 UNITS: 5000 INJECTION INTRAVENOUS; SUBCUTANEOUS at 08:34

## 2022-01-01 RX ADMIN — INSULIN HUMAN 24 UNITS: 100 INJECTION, SUSPENSION SUBCUTANEOUS at 05:18

## 2022-01-01 RX ADMIN — LABETALOL HYDROCHLORIDE 20 MG: 5 INJECTION, SOLUTION INTRAVENOUS at 20:17

## 2022-01-01 RX ADMIN — HYDRALAZINE HYDROCHLORIDE 50 MG: 50 TABLET, FILM COATED ORAL at 15:50

## 2022-01-01 RX ADMIN — Medication 95 MG: at 16:51

## 2022-01-01 RX ADMIN — NITROGLYCERIN 1 INCH: 20 OINTMENT TOPICAL at 00:00

## 2022-01-01 RX ADMIN — Medication 1 SPRAY: at 15:27

## 2022-01-01 RX ADMIN — INSULIN LISPRO 1 UNITS: 100 INJECTION, SOLUTION INTRAVENOUS; SUBCUTANEOUS at 18:17

## 2022-01-01 RX ADMIN — FAMOTIDINE 20 MG: 20 TABLET ORAL at 08:02

## 2022-01-01 RX ADMIN — ATORVASTATIN CALCIUM 20 MG: 20 TABLET, FILM COATED ORAL at 21:16

## 2022-01-01 RX ADMIN — INSULIN LISPRO 1 UNITS: 100 INJECTION, SOLUTION INTRAVENOUS; SUBCUTANEOUS at 19:15

## 2022-01-01 RX ADMIN — ACETAMINOPHEN 650 MG: 650 SOLUTION ORAL at 02:29

## 2022-01-01 RX ADMIN — HYOSCYAMINE SULFATE: 16 SOLUTION at 14:17

## 2022-01-01 RX ADMIN — HYOSCYAMINE SULFATE: 16 SOLUTION at 09:47

## 2022-01-01 RX ADMIN — INSULIN LISPRO 4 UNITS: 100 INJECTION, SOLUTION INTRAVENOUS; SUBCUTANEOUS at 05:52

## 2022-01-01 RX ADMIN — PIPERACILLIN SODIUM AND TAZOBACTAM SODIUM 3.38 G: 3; .375 INJECTION, POWDER, LYOPHILIZED, FOR SOLUTION INTRAVENOUS at 17:12

## 2022-01-01 RX ADMIN — HYDRALAZINE HYDROCHLORIDE 50 MG: 50 TABLET, FILM COATED ORAL at 21:28

## 2022-01-01 RX ADMIN — HEPARIN SODIUM 5000 UNITS: 5000 INJECTION INTRAVENOUS; SUBCUTANEOUS at 16:43

## 2022-01-01 RX ADMIN — GLYCOPYRROLATE 0.1 MG: 0.2 INJECTION INTRAMUSCULAR; INTRAVENOUS at 20:31

## 2022-01-01 RX ADMIN — BRIMONIDINE TARTRATE 1 DROP: 2 SOLUTION OPHTHALMIC at 17:07

## 2022-01-01 RX ADMIN — MEROPENEM 1 G: 1 INJECTION, POWDER, FOR SOLUTION INTRAVENOUS at 10:25

## 2022-01-01 RX ADMIN — INSULIN LISPRO 1 UNITS: 100 INJECTION, SOLUTION INTRAVENOUS; SUBCUTANEOUS at 00:08

## 2022-01-01 RX ADMIN — GABAPENTIN 300 MG: 300 CAPSULE ORAL at 09:29

## 2022-01-01 RX ADMIN — Medication 1 SPRAY: at 18:47

## 2022-01-01 RX ADMIN — GLYCOPYRROLATE 0.1 MG: 0.2 INJECTION INTRAMUSCULAR; INTRAVENOUS at 20:51

## 2022-01-01 RX ADMIN — FAMOTIDINE 20 MG: 20 TABLET ORAL at 09:46

## 2022-01-01 RX ADMIN — INSULIN GLARGINE 25 UNITS: 100 INJECTION, SOLUTION SUBCUTANEOUS at 22:05

## 2022-01-01 RX ADMIN — ATORVASTATIN CALCIUM 20 MG: 20 TABLET, FILM COATED ORAL at 21:28

## 2022-01-01 RX ADMIN — FERROUS SULFATE TAB 325 MG (65 MG ELEMENTAL FE) 325 MG: 325 (65 FE) TAB at 22:00

## 2022-01-01 RX ADMIN — FLUCONAZOLE 200 MG: 200 INJECTION, SOLUTION INTRAVENOUS at 08:01

## 2022-01-01 RX ADMIN — INSULIN GLARGINE 50 UNITS: 100 INJECTION, SOLUTION SUBCUTANEOUS at 09:15

## 2022-01-01 RX ADMIN — BRIMONIDINE TARTRATE 1 DROP: 2 SOLUTION OPHTHALMIC at 10:26

## 2022-01-01 RX ADMIN — LATANOPROST 1 DROP: 50 SOLUTION OPHTHALMIC at 18:30

## 2022-01-01 RX ADMIN — LATANOPROST 1 DROP: 50 SOLUTION OPHTHALMIC at 10:35

## 2022-01-01 RX ADMIN — CEFTAZIDIME 1 G: 1 INJECTION, POWDER, FOR SOLUTION INTRAMUSCULAR; INTRAVENOUS at 11:00

## 2022-01-01 RX ADMIN — SACUBITRIL AND VALSARTAN 1 TABLET: 24; 26 TABLET, FILM COATED ORAL at 20:49

## 2022-01-01 RX ADMIN — SODIUM CHLORIDE 100 ML/HR: 4.5 INJECTION, SOLUTION INTRAVENOUS at 21:00

## 2022-01-01 RX ADMIN — INSULIN LISPRO 6 UNITS: 100 INJECTION, SOLUTION INTRAVENOUS; SUBCUTANEOUS at 03:54

## 2022-01-01 RX ADMIN — FERROUS SULFATE TAB 325 MG (65 MG ELEMENTAL FE) 325 MG: 325 (65 FE) TAB at 10:05

## 2022-01-01 RX ADMIN — HYDRALAZINE HYDROCHLORIDE 12.5 MG: 25 TABLET, FILM COATED ORAL at 16:09

## 2022-01-01 RX ADMIN — FLUCONAZOLE 200 MG: 2 INJECTION, SOLUTION INTRAVENOUS at 14:10

## 2022-01-01 RX ADMIN — INSULIN LISPRO 4 UNITS: 100 INJECTION, SOLUTION INTRAVENOUS; SUBCUTANEOUS at 18:00

## 2022-01-01 RX ADMIN — SODIUM CHLORIDE, PRESERVATIVE FREE 10 ML: 5 INJECTION INTRAVENOUS at 14:54

## 2022-01-01 RX ADMIN — MINERAL SUPPLEMENT IRON 300 MG / 5 ML STRENGTH LIQUID 100 PER BOX UNFLAVORED 300 MG: at 20:11

## 2022-01-01 RX ADMIN — LORAZEPAM 1 MG: 2 INJECTION INTRAMUSCULAR at 17:10

## 2022-01-01 RX ADMIN — BRIMONIDINE TARTRATE 1 DROP: 2 SOLUTION/ DROPS OPHTHALMIC at 09:30

## 2022-01-01 RX ADMIN — HYDRALAZINE HYDROCHLORIDE 50 MG: 50 TABLET, FILM COATED ORAL at 15:49

## 2022-01-01 RX ADMIN — HEPARIN SODIUM 5000 UNITS: 5000 INJECTION INTRAVENOUS; SUBCUTANEOUS at 00:57

## 2022-01-01 RX ADMIN — GABAPENTIN 300 MG: 300 CAPSULE ORAL at 21:14

## 2022-01-01 RX ADMIN — HYDRALAZINE HYDROCHLORIDE 12.5 MG: 25 TABLET, FILM COATED ORAL at 11:15

## 2022-01-01 RX ADMIN — FLUCYTOSINE 500 MG: 500 CAPSULE ORAL at 13:15

## 2022-01-01 RX ADMIN — INSULIN LISPRO 9 UNITS: 100 INJECTION, SOLUTION INTRAVENOUS; SUBCUTANEOUS at 09:20

## 2022-01-01 RX ADMIN — GABAPENTIN 300 MG: 300 CAPSULE ORAL at 10:54

## 2022-01-01 RX ADMIN — HEPARIN SODIUM 5000 UNITS: 5000 INJECTION INTRAVENOUS; SUBCUTANEOUS at 14:55

## 2022-01-01 RX ADMIN — METOPROLOL TARTRATE 50 MG: 50 TABLET, FILM COATED ORAL at 11:17

## 2022-01-01 RX ADMIN — INSULIN GLARGINE 25 UNITS: 100 INJECTION, SOLUTION SUBCUTANEOUS at 09:45

## 2022-01-01 RX ADMIN — PIPERACILLIN SODIUM AND TAZOBACTAM SODIUM 3.38 G: 3; .375 INJECTION, POWDER, LYOPHILIZED, FOR SOLUTION INTRAVENOUS at 15:22

## 2022-01-01 RX ADMIN — MEROPENEM 1 G: 1 INJECTION, POWDER, FOR SOLUTION INTRAVENOUS at 00:12

## 2022-01-01 RX ADMIN — HEPARIN SODIUM 5000 UNITS: 5000 INJECTION INTRAVENOUS; SUBCUTANEOUS at 23:39

## 2022-01-01 RX ADMIN — HYDRALAZINE HYDROCHLORIDE 50 MG: 50 TABLET, FILM COATED ORAL at 09:00

## 2022-01-01 RX ADMIN — SODIUM CHLORIDE 3 G: 900 INJECTION INTRAVENOUS at 14:38

## 2022-01-01 RX ADMIN — BRIMONIDINE TARTRATE 1 DROP: 2 SOLUTION OPHTHALMIC at 15:20

## 2022-01-01 RX ADMIN — PIPERACILLIN AND TAZOBACTAM 3.38 G: 3; .375 INJECTION, POWDER, LYOPHILIZED, FOR SOLUTION INTRAVENOUS at 14:09

## 2022-01-01 RX ADMIN — HYDRALAZINE HYDROCHLORIDE 50 MG: 50 TABLET, FILM COATED ORAL at 08:43

## 2022-01-01 RX ADMIN — MEROPENEM 1 G: 1 INJECTION, POWDER, FOR SOLUTION INTRAVENOUS at 00:29

## 2022-01-01 RX ADMIN — MINERAL SUPPLEMENT IRON 300 MG / 5 ML STRENGTH LIQUID 100 PER BOX UNFLAVORED 300 MG: at 15:54

## 2022-01-01 RX ADMIN — HYDRALAZINE HYDROCHLORIDE 20 MG: 20 INJECTION, SOLUTION INTRAMUSCULAR; INTRAVENOUS at 02:57

## 2022-01-01 RX ADMIN — Medication 1 SPRAY: at 18:04

## 2022-01-01 RX ADMIN — INSULIN GLARGINE 22 UNITS: 100 INJECTION, SOLUTION SUBCUTANEOUS at 09:31

## 2022-01-01 RX ADMIN — INSULIN LISPRO 6 UNITS: 100 INJECTION, SOLUTION INTRAVENOUS; SUBCUTANEOUS at 22:00

## 2022-01-01 RX ADMIN — GABAPENTIN 300 MG: 300 CAPSULE ORAL at 21:53

## 2022-01-01 RX ADMIN — LORAZEPAM 1 MG: 2 INJECTION INTRAMUSCULAR at 16:59

## 2022-01-01 RX ADMIN — SODIUM CHLORIDE, PRESERVATIVE FREE 10 ML: 5 INJECTION INTRAVENOUS at 21:58

## 2022-01-01 RX ADMIN — ALBUMIN (HUMAN) 25 G: 0.25 INJECTION, SOLUTION INTRAVENOUS at 11:58

## 2022-01-01 RX ADMIN — GABAPENTIN 300 MG: 300 CAPSULE ORAL at 10:43

## 2022-01-01 RX ADMIN — HYDRALAZINE HYDROCHLORIDE 50 MG: 50 TABLET, FILM COATED ORAL at 22:28

## 2022-01-01 RX ADMIN — BRIMONIDINE TARTRATE 1 DROP: 2 SOLUTION/ DROPS OPHTHALMIC at 09:48

## 2022-01-01 RX ADMIN — NITROGLYCERIN 1 INCH: 20 OINTMENT TOPICAL at 05:45

## 2022-01-01 RX ADMIN — SACUBITRIL AND VALSARTAN 1 TABLET: 24; 26 TABLET, FILM COATED ORAL at 10:05

## 2022-01-01 RX ADMIN — MINERAL SUPPLEMENT IRON 300 MG / 5 ML STRENGTH LIQUID 100 PER BOX UNFLAVORED 300 MG: at 22:58

## 2022-01-01 RX ADMIN — SODIUM CHLORIDE, PRESERVATIVE FREE 10 ML: 5 INJECTION INTRAVENOUS at 14:46

## 2022-01-01 RX ADMIN — BARIUM SULFATE 5 ML: 400 PASTE ORAL at 10:00

## 2022-01-01 RX ADMIN — Medication 2 TABLET: at 08:03

## 2022-01-01 RX ADMIN — CHOLESTYRAMINE 4 G: 4 POWDER, FOR SUSPENSION ORAL at 16:22

## 2022-01-01 RX ADMIN — GABAPENTIN 300 MG: 300 CAPSULE ORAL at 21:52

## 2022-01-01 RX ADMIN — NITROGLYCERIN 1 INCH: 20 OINTMENT TOPICAL at 14:12

## 2022-01-01 RX ADMIN — NITROGLYCERIN 1 INCH: 20 OINTMENT TOPICAL at 11:26

## 2022-01-01 RX ADMIN — LORAZEPAM 1 MG: 2 INJECTION INTRAMUSCULAR at 11:12

## 2022-01-01 RX ADMIN — TRAMADOL HYDROCHLORIDE 50 MG: 50 TABLET, COATED ORAL at 20:11

## 2022-01-01 RX ADMIN — HYDROMORPHONE HYDROCHLORIDE 2 MG: 2 INJECTION, SOLUTION INTRAMUSCULAR; INTRAVENOUS; SUBCUTANEOUS at 23:44

## 2022-01-01 RX ADMIN — GLYCOPYRROLATE 0.4 MG: 0.2 INJECTION INTRAMUSCULAR; INTRAVENOUS at 20:33

## 2022-01-01 RX ADMIN — PHYTONADIONE 10 MG: 5 TABLET ORAL at 09:00

## 2022-01-01 RX ADMIN — CEFTAZIDIME, AVIBACTAM 2.5 G: 2; .5 POWDER, FOR SOLUTION INTRAVENOUS at 14:11

## 2022-01-01 RX ADMIN — INSULIN LISPRO 12 UNITS: 100 INJECTION, SOLUTION INTRAVENOUS; SUBCUTANEOUS at 06:21

## 2022-01-01 RX ADMIN — LATANOPROST 1 DROP: 50 SOLUTION OPHTHALMIC at 19:35

## 2022-01-01 RX ADMIN — LATANOPROST 1 DROP: 50 SOLUTION OPHTHALMIC at 18:16

## 2022-01-01 RX ADMIN — HYDRALAZINE HYDROCHLORIDE 50 MG: 50 TABLET, FILM COATED ORAL at 22:59

## 2022-01-01 RX ADMIN — Medication 1 SPRAY: at 10:35

## 2022-01-01 RX ADMIN — Medication 1 SPRAY: at 08:20

## 2022-01-01 RX ADMIN — AMLODIPINE BESYLATE 5 MG: 5 TABLET ORAL at 11:16

## 2022-01-01 RX ADMIN — MINERAL SUPPLEMENT IRON 300 MG / 5 ML STRENGTH LIQUID 100 PER BOX UNFLAVORED 300 MG: at 22:46

## 2022-01-01 RX ADMIN — LATANOPROST 1 DROP: 50 SOLUTION OPHTHALMIC at 17:09

## 2022-01-01 RX ADMIN — INSULIN HUMAN 24 UNITS: 100 INJECTION, SUSPENSION SUBCUTANEOUS at 11:01

## 2022-01-01 RX ADMIN — ATORVASTATIN CALCIUM 20 MG: 20 TABLET, FILM COATED ORAL at 20:33

## 2022-01-01 RX ADMIN — ENOXAPARIN SODIUM 40 MG: 100 INJECTION SUBCUTANEOUS at 09:19

## 2022-01-01 RX ADMIN — MINERAL SUPPLEMENT IRON 300 MG / 5 ML STRENGTH LIQUID 100 PER BOX UNFLAVORED 300 MG: at 11:35

## 2022-01-01 RX ADMIN — PIPERACILLIN SODIUM AND TAZOBACTAM SODIUM 3.38 G: 3; .375 INJECTION, POWDER, LYOPHILIZED, FOR SOLUTION INTRAVENOUS at 19:58

## 2022-01-01 RX ADMIN — HYDRALAZINE HYDROCHLORIDE 12.5 MG: 25 TABLET, FILM COATED ORAL at 17:17

## 2022-01-01 RX ADMIN — INSULIN LISPRO 1 UNITS: 100 INJECTION, SOLUTION INTRAVENOUS; SUBCUTANEOUS at 11:03

## 2022-01-01 RX ADMIN — AMLODIPINE BESYLATE 5 MG: 5 TABLET ORAL at 08:55

## 2022-01-01 RX ADMIN — MINERAL SUPPLEMENT IRON 300 MG / 5 ML STRENGTH LIQUID 100 PER BOX UNFLAVORED 300 MG: at 09:45

## 2022-01-01 RX ADMIN — INSULIN LISPRO 15 UNITS: 100 INJECTION, SOLUTION INTRAVENOUS; SUBCUTANEOUS at 00:51

## 2022-01-01 RX ADMIN — ENOXAPARIN SODIUM 40 MG: 100 INJECTION SUBCUTANEOUS at 09:39

## 2022-01-01 RX ADMIN — GABAPENTIN 300 MG: 300 CAPSULE ORAL at 20:27

## 2022-01-01 RX ADMIN — SODIUM CHLORIDE 3 G: 900 INJECTION INTRAVENOUS at 14:51

## 2022-01-01 RX ADMIN — Medication 2 TABLET: at 08:48

## 2022-01-01 RX ADMIN — SODIUM CHLORIDE, PRESERVATIVE FREE 10 ML: 5 INJECTION INTRAVENOUS at 05:50

## 2022-01-01 RX ADMIN — GABAPENTIN 300 MG: 300 CAPSULE ORAL at 21:33

## 2022-01-01 RX ADMIN — FLUCYTOSINE 500 MG: 500 CAPSULE ORAL at 12:47

## 2022-01-01 RX ADMIN — CHOLESTYRAMINE 4 G: 4 POWDER, FOR SUSPENSION ORAL at 18:04

## 2022-01-01 RX ADMIN — BRIMONIDINE TARTRATE 1 DROP: 2 SOLUTION/ DROPS OPHTHALMIC at 22:01

## 2022-01-01 RX ADMIN — INSULIN LISPRO 7 UNITS: 100 INJECTION, SOLUTION INTRAVENOUS; SUBCUTANEOUS at 12:53

## 2022-01-01 RX ADMIN — Medication 1500 MG: at 12:32

## 2022-01-01 RX ADMIN — BRIMONIDINE TARTRATE 1 DROP: 2 SOLUTION/ DROPS OPHTHALMIC at 17:17

## 2022-01-01 RX ADMIN — SODIUM CHLORIDE 3 G: 900 INJECTION INTRAVENOUS at 06:12

## 2022-01-01 RX ADMIN — FUROSEMIDE 40 MG: 10 INJECTION, SOLUTION INTRAMUSCULAR; INTRAVENOUS at 09:14

## 2022-01-01 RX ADMIN — LINEZOLID 600 MG: 600 TABLET, FILM COATED ORAL at 11:45

## 2022-01-01 RX ADMIN — FAMOTIDINE 20 MG: 20 TABLET ORAL at 09:39

## 2022-01-01 RX ADMIN — INSULIN LISPRO 9 UNITS: 100 INJECTION, SOLUTION INTRAVENOUS; SUBCUTANEOUS at 15:02

## 2022-01-01 RX ADMIN — GLYCOPYRROLATE 0.4 MG: 0.2 INJECTION INTRAMUSCULAR; INTRAVENOUS at 08:31

## 2022-01-01 RX ADMIN — FLUCONAZOLE 400 MG: 2 INJECTION, SOLUTION INTRAVENOUS at 11:48

## 2022-01-01 RX ADMIN — GLUCAGON HYDROCHLORIDE 1 MG: KIT at 11:45

## 2022-01-01 RX ADMIN — PSYLLIUM HUSK 1 PACKET: 3.4 POWDER ORAL at 10:34

## 2022-01-01 RX ADMIN — GABAPENTIN 100 MG: 100 CAPSULE ORAL at 09:18

## 2022-01-01 RX ADMIN — GLYCOPYRROLATE 0.4 MG: 0.2 INJECTION INTRAMUSCULAR; INTRAVENOUS at 10:10

## 2022-01-01 RX ADMIN — Medication 1 SPRAY: at 11:12

## 2022-01-01 RX ADMIN — BRIMONIDINE TARTRATE 1 DROP: 2 SOLUTION/ DROPS OPHTHALMIC at 16:10

## 2022-01-01 RX ADMIN — INSULIN LISPRO 6 UNITS: 100 INJECTION, SOLUTION INTRAVENOUS; SUBCUTANEOUS at 16:46

## 2022-01-01 RX ADMIN — INSULIN HUMAN 5 UNITS: 100 INJECTION, SOLUTION PARENTERAL at 00:51

## 2022-01-01 RX ADMIN — GABAPENTIN 300 MG: 300 CAPSULE ORAL at 08:16

## 2022-01-01 RX ADMIN — BRIMONIDINE TARTRATE 1 DROP: 2 SOLUTION OPHTHALMIC at 21:59

## 2022-01-01 RX ADMIN — GABAPENTIN 300 MG: 300 CAPSULE ORAL at 20:53

## 2022-01-01 RX ADMIN — METHYLPREDNISOLONE SODIUM SUCCINATE 40 MG: 40 INJECTION, POWDER, FOR SOLUTION INTRAMUSCULAR; INTRAVENOUS at 23:25

## 2022-01-01 RX ADMIN — LATANOPROST 1 DROP: 50 SOLUTION OPHTHALMIC at 18:41

## 2022-01-01 RX ADMIN — LINEZOLID 600 MG: 600 TABLET, FILM COATED ORAL at 09:06

## 2022-01-01 RX ADMIN — HEPARIN SODIUM 5000 UNITS: 5000 INJECTION INTRAVENOUS; SUBCUTANEOUS at 23:53

## 2022-01-01 RX ADMIN — INSULIN LISPRO 2 UNITS: 100 INJECTION, SOLUTION INTRAVENOUS; SUBCUTANEOUS at 06:28

## 2022-01-01 RX ADMIN — HYDRALAZINE HYDROCHLORIDE 50 MG: 50 TABLET, FILM COATED ORAL at 16:34

## 2022-01-01 RX ADMIN — INSULIN LISPRO 1 UNITS: 100 INJECTION, SOLUTION INTRAVENOUS; SUBCUTANEOUS at 05:45

## 2022-01-01 RX ADMIN — FAMOTIDINE 20 MG: 20 TABLET ORAL at 08:00

## 2022-01-01 RX ADMIN — ASPIRIN 81 MG CHEWABLE TABLET 81 MG: 81 TABLET CHEWABLE at 09:20

## 2022-01-01 RX ADMIN — GABAPENTIN 300 MG: 300 CAPSULE ORAL at 21:57

## 2022-01-01 RX ADMIN — ASPIRIN 81 MG CHEWABLE TABLET 81 MG: 81 TABLET CHEWABLE at 10:29

## 2022-01-01 RX ADMIN — IOPAMIDOL 100 ML: 755 INJECTION, SOLUTION INTRAVENOUS at 00:00

## 2022-01-01 RX ADMIN — GABAPENTIN 300 MG: 300 CAPSULE ORAL at 11:07

## 2022-01-01 RX ADMIN — METHYLPREDNISOLONE SODIUM SUCCINATE 40 MG: 40 INJECTION, POWDER, FOR SOLUTION INTRAMUSCULAR; INTRAVENOUS at 17:12

## 2022-01-01 RX ADMIN — INSULIN LISPRO 6 UNITS: 100 INJECTION, SOLUTION INTRAVENOUS; SUBCUTANEOUS at 20:44

## 2022-01-01 RX ADMIN — BRIMONIDINE TARTRATE 1 DROP: 2 SOLUTION OPHTHALMIC at 23:50

## 2022-01-01 RX ADMIN — INSULIN LISPRO 7 UNITS: 100 INJECTION, SOLUTION INTRAVENOUS; SUBCUTANEOUS at 08:30

## 2022-01-01 RX ADMIN — INSULIN LISPRO 1 UNITS: 100 INJECTION, SOLUTION INTRAVENOUS; SUBCUTANEOUS at 23:44

## 2022-01-01 RX ADMIN — ENOXAPARIN SODIUM 40 MG: 100 INJECTION SUBCUTANEOUS at 10:24

## 2022-01-01 RX ADMIN — INSULIN GLARGINE 10 UNITS: 100 INJECTION, SOLUTION SUBCUTANEOUS at 21:02

## 2022-01-01 RX ADMIN — CALCIUM POLYCARBOPHIL 625 MG: 625 TABLET, FILM COATED ORAL at 10:16

## 2022-01-01 RX ADMIN — MINERAL SUPPLEMENT IRON 300 MG / 5 ML STRENGTH LIQUID 100 PER BOX UNFLAVORED 300 MG: at 23:38

## 2022-01-01 RX ADMIN — INSULIN LISPRO 6 UNITS: 100 INJECTION, SOLUTION INTRAVENOUS; SUBCUTANEOUS at 12:13

## 2022-01-01 RX ADMIN — ASPIRIN 81 MG: 81 TABLET, COATED ORAL at 10:14

## 2022-01-01 RX ADMIN — INSULIN LISPRO 2 UNITS: 100 INJECTION, SOLUTION INTRAVENOUS; SUBCUTANEOUS at 06:00

## 2022-01-01 RX ADMIN — HYDROMORPHONE HYDROCHLORIDE 1 MG: 1 INJECTION, SOLUTION INTRAMUSCULAR; INTRAVENOUS; SUBCUTANEOUS at 15:38

## 2022-01-01 RX ADMIN — FUROSEMIDE 40 MG: 10 INJECTION, SOLUTION INTRAMUSCULAR; INTRAVENOUS at 20:47

## 2022-01-01 RX ADMIN — GABAPENTIN 300 MG: 300 CAPSULE ORAL at 10:26

## 2022-01-01 RX ADMIN — SACUBITRIL AND VALSARTAN 1 TABLET: 24; 26 TABLET, FILM COATED ORAL at 08:09

## 2022-01-01 RX ADMIN — HEPARIN SODIUM 5000 UNITS: 5000 INJECTION INTRAVENOUS; SUBCUTANEOUS at 15:59

## 2022-01-01 RX ADMIN — INSULIN LISPRO 4 UNITS: 100 INJECTION, SOLUTION INTRAVENOUS; SUBCUTANEOUS at 18:29

## 2022-01-01 RX ADMIN — INSULIN LISPRO 2 UNITS: 100 INJECTION, SOLUTION INTRAVENOUS; SUBCUTANEOUS at 18:03

## 2022-01-01 RX ADMIN — FERROUS SULFATE TAB 325 MG (65 MG ELEMENTAL FE) 325 MG: 325 (65 FE) TAB at 21:30

## 2022-01-01 RX ADMIN — VANCOMYCIN HYDROCHLORIDE 500 MG: 500 INJECTION, POWDER, LYOPHILIZED, FOR SOLUTION INTRAVENOUS at 14:12

## 2022-01-01 RX ADMIN — SODIUM CHLORIDE 3 G: 900 INJECTION INTRAVENOUS at 15:08

## 2022-01-01 RX ADMIN — CHOLESTYRAMINE 4 G: 4 POWDER, FOR SUSPENSION ORAL at 17:56

## 2022-01-01 RX ADMIN — METOPROLOL TARTRATE 50 MG: 50 TABLET, FILM COATED ORAL at 20:48

## 2022-01-01 RX ADMIN — INSULIN LISPRO 6 UNITS: 100 INJECTION, SOLUTION INTRAVENOUS; SUBCUTANEOUS at 18:22

## 2022-01-01 RX ADMIN — Medication 1 SPRAY: at 09:00

## 2022-01-01 RX ADMIN — INSULIN LISPRO 9 UNITS: 100 INJECTION, SOLUTION INTRAVENOUS; SUBCUTANEOUS at 13:28

## 2022-01-01 RX ADMIN — GABAPENTIN 300 MG: 300 CAPSULE ORAL at 22:34

## 2022-01-01 RX ADMIN — METHYLPREDNISOLONE SODIUM SUCCINATE 40 MG: 40 INJECTION, POWDER, FOR SOLUTION INTRAMUSCULAR; INTRAVENOUS at 00:04

## 2022-01-01 RX ADMIN — INSULIN LISPRO 12 UNITS: 100 INJECTION, SOLUTION INTRAVENOUS; SUBCUTANEOUS at 23:11

## 2022-01-01 RX ADMIN — ATORVASTATIN CALCIUM 20 MG: 20 TABLET, FILM COATED ORAL at 00:30

## 2022-01-01 RX ADMIN — MINERAL SUPPLEMENT IRON 300 MG / 5 ML STRENGTH LIQUID 100 PER BOX UNFLAVORED 300 MG: at 09:31

## 2022-01-01 RX ADMIN — MINERAL SUPPLEMENT IRON 300 MG / 5 ML STRENGTH LIQUID 100 PER BOX UNFLAVORED 300 MG: at 08:16

## 2022-01-01 RX ADMIN — BRIMONIDINE TARTRATE 1 DROP: 2 SOLUTION/ DROPS OPHTHALMIC at 09:17

## 2022-01-01 RX ADMIN — HYDROMORPHONE HYDROCHLORIDE 1 MG: 1 INJECTION, SOLUTION INTRAMUSCULAR; INTRAVENOUS; SUBCUTANEOUS at 17:23

## 2022-01-01 RX ADMIN — MINERAL SUPPLEMENT IRON 300 MG / 5 ML STRENGTH LIQUID 100 PER BOX UNFLAVORED 300 MG: at 22:50

## 2022-01-01 RX ADMIN — GABAPENTIN 300 MG: 300 CAPSULE ORAL at 20:33

## 2022-01-01 RX ADMIN — INSULIN LISPRO 12 UNITS: 100 INJECTION, SOLUTION INTRAVENOUS; SUBCUTANEOUS at 12:12

## 2022-01-01 RX ADMIN — POTASSIUM CHLORIDE 40 MEQ: 750 TABLET, FILM COATED, EXTENDED RELEASE ORAL at 10:07

## 2022-01-01 RX ADMIN — PHYTONADIONE 5 MG: 10 INJECTION, EMULSION INTRAMUSCULAR; INTRAVENOUS; SUBCUTANEOUS at 14:33

## 2022-01-01 RX ADMIN — AMLODIPINE BESYLATE 5 MG: 5 TABLET ORAL at 17:26

## 2022-01-01 RX ADMIN — MEROPENEM 1 G: 1 INJECTION, POWDER, FOR SOLUTION INTRAVENOUS at 17:28

## 2022-01-01 RX ADMIN — LOPERAMIDE HYDROCHLORIDE 4 MG: 2 CAPSULE ORAL at 11:06

## 2022-01-01 RX ADMIN — INSULIN LISPRO 9 UNITS: 100 INJECTION, SOLUTION INTRAVENOUS; SUBCUTANEOUS at 20:27

## 2022-01-01 RX ADMIN — INSULIN LISPRO 10 UNITS: 100 INJECTION, SOLUTION INTRAVENOUS; SUBCUTANEOUS at 10:09

## 2022-01-01 RX ADMIN — HEPARIN SODIUM 5000 UNITS: 5000 INJECTION INTRAVENOUS; SUBCUTANEOUS at 09:30

## 2022-01-01 RX ADMIN — GABAPENTIN 300 MG: 300 CAPSULE ORAL at 21:34

## 2022-01-01 RX ADMIN — Medication: at 11:21

## 2022-01-01 RX ADMIN — NITROGLYCERIN 1 INCH: 20 OINTMENT TOPICAL at 17:19

## 2022-01-01 RX ADMIN — HYDRALAZINE HYDROCHLORIDE 20 MG: 20 INJECTION, SOLUTION INTRAMUSCULAR; INTRAVENOUS at 05:06

## 2022-01-01 RX ADMIN — HYDRALAZINE HYDROCHLORIDE 50 MG: 50 TABLET, FILM COATED ORAL at 17:06

## 2022-01-01 RX ADMIN — ASPIRIN 81 MG: 81 TABLET, COATED ORAL at 10:55

## 2022-01-01 RX ADMIN — FAMOTIDINE 20 MG: 20 TABLET ORAL at 08:10

## 2022-01-01 RX ADMIN — SODIUM CHLORIDE 3 G: 900 INJECTION INTRAVENOUS at 15:17

## 2022-01-01 RX ADMIN — IPRATROPIUM BROMIDE AND ALBUTEROL SULFATE 3 ML: .5; 3 SOLUTION RESPIRATORY (INHALATION) at 18:47

## 2022-01-01 RX ADMIN — LABETALOL HYDROCHLORIDE 20 MG: 5 INJECTION, SOLUTION INTRAVENOUS at 06:09

## 2022-01-01 RX ADMIN — ACETAMINOPHEN 650 MG: 650 SOLUTION ORAL at 20:53

## 2022-01-01 RX ADMIN — FERROUS SULFATE TAB 325 MG (65 MG ELEMENTAL FE) 325 MG: 325 (65 FE) TAB at 20:54

## 2022-01-01 RX ADMIN — GABAPENTIN 300 MG: 300 CAPSULE ORAL at 11:13

## 2022-01-01 RX ADMIN — SODIUM CHLORIDE 3 G: 900 INJECTION INTRAVENOUS at 21:23

## 2022-01-01 RX ADMIN — METOPROLOL TARTRATE 50 MG: 50 TABLET, FILM COATED ORAL at 21:58

## 2022-01-01 RX ADMIN — SODIUM CHLORIDE 3 G: 900 INJECTION INTRAVENOUS at 21:15

## 2022-01-01 RX ADMIN — NITROGLYCERIN 1 INCH: 20 OINTMENT TOPICAL at 18:00

## 2022-01-01 RX ADMIN — FERROUS SULFATE TAB 325 MG (65 MG ELEMENTAL FE) 325 MG: 325 (65 FE) TAB at 08:48

## 2022-01-01 RX ADMIN — FUROSEMIDE 40 MG: 10 INJECTION, SOLUTION INTRAMUSCULAR; INTRAVENOUS at 21:18

## 2022-01-01 RX ADMIN — BRIMONIDINE TARTRATE 1 DROP: 2 SOLUTION/ DROPS OPHTHALMIC at 10:18

## 2022-01-01 RX ADMIN — DEXTROSE MONOHYDRATE 75 ML/HR: 50 INJECTION, SOLUTION INTRAVENOUS at 21:01

## 2022-01-01 RX ADMIN — FAMOTIDINE 20 MG: 20 TABLET ORAL at 10:29

## 2022-01-01 RX ADMIN — BRIMONIDINE TARTRATE 1 DROP: 2 SOLUTION OPHTHALMIC at 09:31

## 2022-01-01 RX ADMIN — ROCURONIUM BROMIDE 50 MG: 10 INJECTION, SOLUTION INTRAVENOUS at 12:24

## 2022-01-01 RX ADMIN — INSULIN GLARGINE 10 UNITS: 100 INJECTION, SOLUTION SUBCUTANEOUS at 21:47

## 2022-01-01 RX ADMIN — SODIUM CHLORIDE, PRESERVATIVE FREE 10 ML: 5 INJECTION INTRAVENOUS at 20:36

## 2022-01-01 RX ADMIN — BRIMONIDINE TARTRATE 1 DROP: 2 SOLUTION OPHTHALMIC at 22:22

## 2022-01-01 RX ADMIN — METOPROLOL TARTRATE 50 MG: 50 TABLET, FILM COATED ORAL at 20:42

## 2022-01-01 RX ADMIN — HEPARIN SODIUM 5000 UNITS: 5000 INJECTION INTRAVENOUS; SUBCUTANEOUS at 17:15

## 2022-01-01 RX ADMIN — INSULIN LISPRO 12 UNITS: 100 INJECTION, SOLUTION INTRAVENOUS; SUBCUTANEOUS at 00:21

## 2022-01-01 RX ADMIN — HYDRALAZINE HYDROCHLORIDE 10 MG: 20 INJECTION, SOLUTION INTRAMUSCULAR; INTRAVENOUS at 14:30

## 2022-01-01 RX ADMIN — PHENYLEPHRINE HYDROCHLORIDE 200 MCG: 10 INJECTION INTRAVENOUS at 10:36

## 2022-01-01 RX ADMIN — NITROGLYCERIN 1 INCH: 20 OINTMENT TOPICAL at 14:56

## 2022-01-01 RX ADMIN — HEPARIN SODIUM 5000 UNITS: 5000 INJECTION INTRAVENOUS; SUBCUTANEOUS at 05:54

## 2022-01-01 RX ADMIN — SACUBITRIL AND VALSARTAN 1 TABLET: 24; 26 TABLET, FILM COATED ORAL at 21:13

## 2022-01-01 RX ADMIN — FERROUS SULFATE TAB 325 MG (65 MG ELEMENTAL FE) 325 MG: 325 (65 FE) TAB at 09:43

## 2022-01-01 RX ADMIN — LATANOPROST 1 DROP: 50 SOLUTION OPHTHALMIC at 17:21

## 2022-01-01 RX ADMIN — SODIUM CHLORIDE 3 G: 900 INJECTION INTRAVENOUS at 06:18

## 2022-01-01 RX ADMIN — FLUCONAZOLE 200 MG: 2 INJECTION, SOLUTION INTRAVENOUS at 15:26

## 2022-01-01 RX ADMIN — ASPIRIN 81 MG: 81 TABLET, COATED ORAL at 11:16

## 2022-01-01 RX ADMIN — NITROGLYCERIN 1 INCH: 20 OINTMENT TOPICAL at 23:17

## 2022-01-01 RX ADMIN — CHOLESTYRAMINE 4 G: 4 POWDER, FOR SUSPENSION ORAL at 17:51

## 2022-01-01 RX ADMIN — FLUCONAZOLE 200 MG: 200 INJECTION, SOLUTION INTRAVENOUS at 09:48

## 2022-01-01 RX ADMIN — METOPROLOL TARTRATE 12.5 MG: 25 TABLET, FILM COATED ORAL at 20:20

## 2022-01-01 RX ADMIN — GLYCOPYRROLATE 0.1 MG: 0.2 INJECTION INTRAMUSCULAR; INTRAVENOUS at 20:49

## 2022-01-01 RX ADMIN — INSULIN LISPRO 9 UNITS: 100 INJECTION, SOLUTION INTRAVENOUS; SUBCUTANEOUS at 17:02

## 2022-01-01 RX ADMIN — FLUCYTOSINE 500 MG: 500 CAPSULE ORAL at 06:00

## 2022-01-01 RX ADMIN — HEPARIN SODIUM 5000 UNITS: 5000 INJECTION INTRAVENOUS; SUBCUTANEOUS at 08:43

## 2022-01-01 RX ADMIN — HYDRALAZINE HYDROCHLORIDE 50 MG: 50 TABLET, FILM COATED ORAL at 18:11

## 2022-01-01 RX ADMIN — POTASSIUM CHLORIDE 10 MEQ: 7.46 INJECTION, SOLUTION INTRAVENOUS at 09:41

## 2022-01-01 RX ADMIN — HYDROMORPHONE HYDROCHLORIDE 2 MG: 2 INJECTION, SOLUTION INTRAMUSCULAR; INTRAVENOUS; SUBCUTANEOUS at 16:10

## 2022-01-01 RX ADMIN — BRIMONIDINE TARTRATE 1 DROP: 2 SOLUTION/ DROPS OPHTHALMIC at 17:18

## 2022-01-01 RX ADMIN — ACETAMINOPHEN ORAL SOLUTION 650 MG: 650 SOLUTION ORAL at 10:09

## 2022-01-01 RX ADMIN — LATANOPROST 1 DROP: 50 SOLUTION OPHTHALMIC at 18:12

## 2022-01-01 RX ADMIN — GABAPENTIN 300 MG: 300 CAPSULE ORAL at 08:12

## 2022-01-01 RX ADMIN — PIPERACILLIN SODIUM AND TAZOBACTAM SODIUM 3.38 G: 3; .375 INJECTION, POWDER, LYOPHILIZED, FOR SOLUTION INTRAVENOUS at 17:26

## 2022-01-01 RX ADMIN — Medication 1 SPRAY: at 15:26

## 2022-01-01 RX ADMIN — INSULIN LISPRO 4 UNITS: 100 INJECTION, SOLUTION INTRAVENOUS; SUBCUTANEOUS at 05:51

## 2022-01-01 RX ADMIN — AMLODIPINE BESYLATE 5 MG: 5 TABLET ORAL at 10:12

## 2022-01-01 RX ADMIN — AMLODIPINE BESYLATE 5 MG: 5 TABLET ORAL at 09:36

## 2022-01-01 RX ADMIN — BRIMONIDINE TARTRATE 1 DROP: 2 SOLUTION/ DROPS OPHTHALMIC at 15:16

## 2022-01-01 RX ADMIN — BRIMONIDINE TARTRATE 1 DROP: 2 SOLUTION OPHTHALMIC at 11:12

## 2022-01-01 RX ADMIN — FLUCYTOSINE 500 MG: 500 CAPSULE ORAL at 05:34

## 2022-01-01 RX ADMIN — GABAPENTIN 300 MG: 300 CAPSULE ORAL at 09:04

## 2022-01-01 RX ADMIN — FLUCONAZOLE 200 MG: 2 INJECTION, SOLUTION INTRAVENOUS at 13:51

## 2022-01-01 RX ADMIN — HYDROMORPHONE HYDROCHLORIDE 1 MG: 1 INJECTION, SOLUTION INTRAMUSCULAR; INTRAVENOUS; SUBCUTANEOUS at 13:18

## 2022-01-01 RX ADMIN — Medication 1 MG: at 15:37

## 2022-01-01 RX ADMIN — ACETAMINOPHEN 650 MG: 325 TABLET ORAL at 04:11

## 2022-01-01 RX ADMIN — FERROUS SULFATE TAB 325 MG (65 MG ELEMENTAL FE) 325 MG: 325 (65 FE) TAB at 09:01

## 2022-01-01 RX ADMIN — FLUCONAZOLE 200 MG: 2 INJECTION, SOLUTION INTRAVENOUS at 13:06

## 2022-01-01 RX ADMIN — HYDRALAZINE HYDROCHLORIDE 12.5 MG: 25 TABLET, FILM COATED ORAL at 16:19

## 2022-01-01 RX ADMIN — GABAPENTIN 300 MG: 300 CAPSULE ORAL at 22:48

## 2022-01-01 RX ADMIN — INSULIN LISPRO 3 UNITS: 100 INJECTION, SOLUTION INTRAVENOUS; SUBCUTANEOUS at 00:22

## 2022-01-01 RX ADMIN — ENOXAPARIN SODIUM 40 MG: 100 INJECTION SUBCUTANEOUS at 10:06

## 2022-01-01 RX ADMIN — AMLODIPINE BESYLATE 5 MG: 5 TABLET ORAL at 09:25

## 2022-01-01 RX ADMIN — NITROGLYCERIN 1 INCH: 20 OINTMENT TOPICAL at 18:12

## 2022-01-01 RX ADMIN — MEROPENEM 1 G: 1 INJECTION, POWDER, FOR SOLUTION INTRAVENOUS at 20:11

## 2022-01-01 RX ADMIN — PIPERACILLIN AND TAZOBACTAM 3.38 G: 3; .375 INJECTION, POWDER, LYOPHILIZED, FOR SOLUTION INTRAVENOUS at 04:23

## 2022-01-01 RX ADMIN — Medication 1 SPRAY: at 09:46

## 2022-01-01 RX ADMIN — CHOLESTYRAMINE 4 G: 4 POWDER, FOR SUSPENSION ORAL at 18:03

## 2022-01-01 RX ADMIN — HYOSCYAMINE SULFATE: 16 SOLUTION at 08:05

## 2022-01-01 RX ADMIN — INSULIN LISPRO 4 UNITS: 100 INJECTION, SOLUTION INTRAVENOUS; SUBCUTANEOUS at 18:32

## 2022-01-01 RX ADMIN — BRIMONIDINE TARTRATE 1 DROP: 2 SOLUTION OPHTHALMIC at 09:26

## 2022-01-01 RX ADMIN — FUROSEMIDE 20 MG: 10 INJECTION, SOLUTION INTRAMUSCULAR; INTRAVENOUS at 21:16

## 2022-01-01 RX ADMIN — ASPIRIN 81 MG CHEWABLE TABLET 81 MG: 81 TABLET CHEWABLE at 08:45

## 2022-01-01 RX ADMIN — BRIMONIDINE TARTRATE 1 DROP: 2 SOLUTION/ DROPS OPHTHALMIC at 09:21

## 2022-01-01 RX ADMIN — INSULIN LISPRO 12 UNITS: 100 INJECTION, SOLUTION INTRAVENOUS; SUBCUTANEOUS at 21:01

## 2022-01-01 RX ADMIN — MINERAL SUPPLEMENT IRON 300 MG / 5 ML STRENGTH LIQUID 100 PER BOX UNFLAVORED 300 MG: at 10:42

## 2022-01-01 RX ADMIN — ASPIRIN 81 MG CHEWABLE TABLET 81 MG: 81 TABLET CHEWABLE at 11:14

## 2022-01-01 RX ADMIN — ACETAMINOPHEN 650 MG: 650 SUPPOSITORY RECTAL at 22:23

## 2022-01-01 RX ADMIN — HEPARIN SODIUM 5000 UNITS: 5000 INJECTION INTRAVENOUS; SUBCUTANEOUS at 00:00

## 2022-01-01 RX ADMIN — HYDRALAZINE HYDROCHLORIDE 50 MG: 50 TABLET, FILM COATED ORAL at 21:12

## 2022-01-01 RX ADMIN — VASOPRESSIN 2 UNITS: 20 INJECTION INTRAVENOUS at 10:49

## 2022-01-01 RX ADMIN — LATANOPROST 1 DROP: 50 SOLUTION OPHTHALMIC at 17:43

## 2022-01-01 RX ADMIN — METOPROLOL TARTRATE 50 MG: 50 TABLET, FILM COATED ORAL at 21:28

## 2022-01-01 RX ADMIN — ACETAMINOPHEN 650 MG: 325 TABLET ORAL at 22:09

## 2022-01-01 RX ADMIN — FERROUS SULFATE TAB 325 MG (65 MG ELEMENTAL FE) 325 MG: 325 (65 FE) TAB at 09:11

## 2022-01-01 RX ADMIN — FUROSEMIDE 20 MG: 10 INJECTION, SOLUTION INTRAMUSCULAR; INTRAVENOUS at 21:24

## 2022-01-01 RX ADMIN — GABAPENTIN 300 MG: 300 CAPSULE ORAL at 08:45

## 2022-01-01 RX ADMIN — BRIMONIDINE TARTRATE 1 DROP: 2 SOLUTION OPHTHALMIC at 00:17

## 2022-01-01 RX ADMIN — INSULIN GLARGINE 25 UNITS: 100 INJECTION, SOLUTION SUBCUTANEOUS at 21:00

## 2022-01-01 RX ADMIN — POTASSIUM CHLORIDE 40 MEQ: 1.5 POWDER, FOR SOLUTION ORAL at 10:07

## 2022-01-01 RX ADMIN — NITROGLYCERIN 1 INCH: 20 OINTMENT TOPICAL at 23:42

## 2022-01-01 RX ADMIN — BRIMONIDINE TARTRATE 1 DROP: 2 SOLUTION/ DROPS OPHTHALMIC at 15:21

## 2022-01-01 RX ADMIN — HYDRALAZINE HYDROCHLORIDE 12.5 MG: 25 TABLET, FILM COATED ORAL at 22:11

## 2022-01-01 RX ADMIN — FAMOTIDINE 20 MG: 20 TABLET ORAL at 22:11

## 2022-01-01 RX ADMIN — INSULIN LISPRO 6 UNITS: 100 INJECTION, SOLUTION INTRAVENOUS; SUBCUTANEOUS at 05:57

## 2022-01-01 RX ADMIN — LORAZEPAM 1 MG: 2 INJECTION INTRAMUSCULAR at 20:15

## 2022-01-01 RX ADMIN — GABAPENTIN 300 MG: 300 CAPSULE ORAL at 21:50

## 2022-01-01 RX ADMIN — METOPROLOL SUCCINATE 50 MG: 50 TABLET, EXTENDED RELEASE ORAL at 21:56

## 2022-01-01 RX ADMIN — PROPOFOL 20 MCG/KG/MIN: 10 INJECTION, EMULSION INTRAVENOUS at 18:03

## 2022-01-01 RX ADMIN — SACUBITRIL AND VALSARTAN 1 TABLET: 24; 26 TABLET, FILM COATED ORAL at 20:50

## 2022-01-01 RX ADMIN — BRIMONIDINE TARTRATE 1 DROP: 2 SOLUTION/ DROPS OPHTHALMIC at 17:30

## 2022-01-01 RX ADMIN — HEPARIN SODIUM 5000 UNITS: 5000 INJECTION INTRAVENOUS; SUBCUTANEOUS at 17:28

## 2022-01-01 RX ADMIN — FAMOTIDINE 20 MG: 20 TABLET ORAL at 20:42

## 2022-01-01 RX ADMIN — FAMOTIDINE 20 MG: 20 TABLET ORAL at 08:31

## 2022-01-01 RX ADMIN — FUROSEMIDE 20 MG: 10 INJECTION, SOLUTION INTRAMUSCULAR; INTRAVENOUS at 08:02

## 2022-01-01 RX ADMIN — INSULIN LISPRO 7 UNITS: 100 INJECTION, SOLUTION INTRAVENOUS; SUBCUTANEOUS at 18:32

## 2022-01-01 RX ADMIN — HEPARIN SODIUM 5000 UNITS: 5000 INJECTION INTRAVENOUS; SUBCUTANEOUS at 07:03

## 2022-01-01 RX ADMIN — PROPOFOL 25 MCG/KG/MIN: 10 INJECTION, EMULSION INTRAVENOUS at 18:05

## 2022-01-01 RX ADMIN — HYDROMORPHONE HYDROCHLORIDE 1 MG: 1 INJECTION, SOLUTION INTRAMUSCULAR; INTRAVENOUS; SUBCUTANEOUS at 00:21

## 2022-01-01 RX ADMIN — GABAPENTIN 300 MG: 300 CAPSULE ORAL at 11:46

## 2022-01-01 RX ADMIN — INSULIN LISPRO 7 UNITS: 100 INJECTION, SOLUTION INTRAVENOUS; SUBCUTANEOUS at 05:06

## 2022-01-01 RX ADMIN — NITROGLYCERIN 1 INCH: 20 OINTMENT TOPICAL at 11:53

## 2022-01-01 RX ADMIN — Medication 2 TABLET: at 10:05

## 2022-01-01 RX ADMIN — GABAPENTIN 100 MG: 100 CAPSULE ORAL at 01:12

## 2022-01-01 RX ADMIN — INSULIN LISPRO 5 UNITS: 100 INJECTION, SOLUTION INTRAVENOUS; SUBCUTANEOUS at 06:36

## 2022-01-01 RX ADMIN — Medication 1 SPRAY: at 16:17

## 2022-01-01 RX ADMIN — MEROPENEM 1 G: 1 INJECTION, POWDER, FOR SOLUTION INTRAVENOUS at 09:31

## 2022-01-01 RX ADMIN — Medication 2 TABLET: at 09:15

## 2022-01-01 RX ADMIN — BRIMONIDINE TARTRATE 1 DROP: 2 SOLUTION/ DROPS OPHTHALMIC at 22:24

## 2022-01-01 RX ADMIN — ACETAMINOPHEN ORAL SOLUTION 650 MG: 650 SOLUTION ORAL at 21:52

## 2022-01-01 RX ADMIN — NITROGLYCERIN 1 INCH: 20 OINTMENT TOPICAL at 06:43

## 2022-01-01 RX ADMIN — GABAPENTIN 100 MG: 100 CAPSULE ORAL at 08:00

## 2022-01-01 RX ADMIN — LATANOPROST 1 DROP: 50 SOLUTION OPHTHALMIC at 19:10

## 2022-01-01 RX ADMIN — GLYCOPYRROLATE 0.4 MG: 0.2 INJECTION INTRAMUSCULAR; INTRAVENOUS at 01:36

## 2022-01-01 RX ADMIN — ASPIRIN 81 MG: 81 TABLET, COATED ORAL at 10:43

## 2022-01-01 RX ADMIN — METOPROLOL TARTRATE 12.5 MG: 25 TABLET, FILM COATED ORAL at 20:36

## 2022-01-01 RX ADMIN — Medication 1 SPRAY: at 21:26

## 2022-01-01 RX ADMIN — INSULIN GLARGINE 32 UNITS: 100 INJECTION, SOLUTION SUBCUTANEOUS at 20:47

## 2022-01-01 RX ADMIN — SODIUM CHLORIDE, PRESERVATIVE FREE 10 ML: 5 INJECTION INTRAVENOUS at 21:26

## 2022-01-01 RX ADMIN — HYDRALAZINE HYDROCHLORIDE 12.5 MG: 25 TABLET, FILM COATED ORAL at 21:13

## 2022-01-01 RX ADMIN — BRIMONIDINE TARTRATE 1 DROP: 2 SOLUTION/ DROPS OPHTHALMIC at 16:56

## 2022-01-01 RX ADMIN — INSULIN LISPRO 2 UNITS: 100 INJECTION, SOLUTION INTRAVENOUS; SUBCUTANEOUS at 00:28

## 2022-01-01 RX ADMIN — CEFTAZIDIME, AVIBACTAM 2.5 G: 2; .5 POWDER, FOR SOLUTION INTRAVENOUS at 05:30

## 2022-01-01 RX ADMIN — FERROUS SULFATE TAB 325 MG (65 MG ELEMENTAL FE) 325 MG: 325 (65 FE) TAB at 09:20

## 2022-01-01 RX ADMIN — GLYCOPYRROLATE 0.4 MG: 0.2 INJECTION INTRAMUSCULAR; INTRAVENOUS at 05:30

## 2022-01-01 RX ADMIN — GABAPENTIN 300 MG: 300 CAPSULE ORAL at 08:43

## 2022-01-01 RX ADMIN — ACETAMINOPHEN 650 MG: 325 TABLET ORAL at 10:05

## 2022-01-01 RX ADMIN — BRIMONIDINE TARTRATE 1 DROP: 2 SOLUTION OPHTHALMIC at 20:54

## 2022-01-01 RX ADMIN — INSULIN LISPRO 6 UNITS: 100 INJECTION, SOLUTION INTRAVENOUS; SUBCUTANEOUS at 15:59

## 2022-01-01 RX ADMIN — MEROPENEM 1 G: 1 INJECTION, POWDER, FOR SOLUTION INTRAVENOUS at 18:00

## 2022-01-01 RX ADMIN — LORAZEPAM 1 MG: 2 INJECTION INTRAMUSCULAR at 20:33

## 2022-01-01 RX ADMIN — AMLODIPINE BESYLATE 5 MG: 5 TABLET ORAL at 09:17

## 2022-01-01 RX ADMIN — GLYCOPYRROLATE 0.4 MG: 0.2 INJECTION INTRAMUSCULAR; INTRAVENOUS at 16:10

## 2022-01-01 RX ADMIN — GLYCOPYRROLATE 0.1 MG: 0.2 INJECTION INTRAMUSCULAR; INTRAVENOUS at 21:22

## 2022-01-01 RX ADMIN — FERROUS SULFATE TAB 325 MG (65 MG ELEMENTAL FE) 325 MG: 325 (65 FE) TAB at 21:56

## 2022-01-01 RX ADMIN — NITROGLYCERIN 1 INCH: 20 OINTMENT TOPICAL at 05:06

## 2022-01-01 RX ADMIN — GLYCOPYRROLATE 0.4 MG: 0.2 INJECTION INTRAMUSCULAR; INTRAVENOUS at 21:04

## 2022-01-01 RX ADMIN — HEPARIN SODIUM 5000 UNITS: 5000 INJECTION INTRAVENOUS; SUBCUTANEOUS at 17:19

## 2022-01-01 RX ADMIN — Medication 2 TABLET: at 09:17

## 2022-01-01 RX ADMIN — ATORVASTATIN CALCIUM 20 MG: 20 TABLET, FILM COATED ORAL at 22:00

## 2022-01-01 RX ADMIN — MINERAL SUPPLEMENT IRON 300 MG / 5 ML STRENGTH LIQUID 100 PER BOX UNFLAVORED 300 MG: at 09:25

## 2022-01-01 RX ADMIN — METOPROLOL SUCCINATE 50 MG: 50 TABLET, EXTENDED RELEASE ORAL at 11:11

## 2022-01-01 RX ADMIN — IPRATROPIUM BROMIDE AND ALBUTEROL SULFATE 3 ML: .5; 3 SOLUTION RESPIRATORY (INHALATION) at 15:28

## 2022-01-01 RX ADMIN — ALBUMIN (HUMAN) 25 G: 0.25 INJECTION, SOLUTION INTRAVENOUS at 23:17

## 2022-01-01 RX ADMIN — Medication 1 SPRAY: at 09:21

## 2022-01-01 RX ADMIN — INSULIN GLARGINE 32 UNITS: 100 INJECTION, SOLUTION SUBCUTANEOUS at 21:56

## 2022-01-01 RX ADMIN — INSULIN GLARGINE 22 UNITS: 100 INJECTION, SOLUTION SUBCUTANEOUS at 21:01

## 2022-01-01 RX ADMIN — GLYCOPYRROLATE 0.1 MG: 0.2 INJECTION INTRAMUSCULAR; INTRAVENOUS at 03:38

## 2022-01-01 RX ADMIN — FENOFIBRATE 48 MG: 48 TABLET ORAL at 09:29

## 2022-01-01 RX ADMIN — BRIMONIDINE TARTRATE 1 DROP: 2 SOLUTION OPHTHALMIC at 09:24

## 2022-01-01 RX ADMIN — SODIUM CHLORIDE, PRESERVATIVE FREE 10 ML: 5 INJECTION INTRAVENOUS at 22:37

## 2022-01-01 RX ADMIN — PIPERACILLIN SODIUM AND TAZOBACTAM SODIUM 3.38 G: 3; .375 INJECTION, POWDER, LYOPHILIZED, FOR SOLUTION INTRAVENOUS at 23:26

## 2022-01-01 RX ADMIN — INSULIN GLARGINE 50 UNITS: 100 INJECTION, SOLUTION SUBCUTANEOUS at 10:33

## 2022-01-01 RX ADMIN — HEPARIN SODIUM 5000 UNITS: 5000 INJECTION INTRAVENOUS; SUBCUTANEOUS at 21:16

## 2022-01-01 RX ADMIN — PROPOFOL 25 MCG/KG/MIN: 10 INJECTION, EMULSION INTRAVENOUS at 05:52

## 2022-01-01 RX ADMIN — Medication 2 TABLET: at 09:46

## 2022-01-01 RX ADMIN — HYDRALAZINE HYDROCHLORIDE 50 MG: 50 TABLET, FILM COATED ORAL at 10:26

## 2022-01-01 RX ADMIN — SODIUM POLYSTYRENE SULFONATE 30 G: 15 SUSPENSION ORAL; RECTAL at 23:31

## 2022-01-01 RX ADMIN — SODIUM CHLORIDE 3 G: 900 INJECTION INTRAVENOUS at 14:32

## 2022-01-01 RX ADMIN — INSULIN GLARGINE 32 UNITS: 100 INJECTION, SOLUTION SUBCUTANEOUS at 22:34

## 2022-01-01 RX ADMIN — Medication 1 SPRAY: at 21:15

## 2022-01-01 RX ADMIN — TRAMADOL HYDROCHLORIDE 50 MG: 50 TABLET, COATED ORAL at 21:12

## 2022-01-01 RX ADMIN — Medication 2 TABLET: at 10:21

## 2022-01-01 RX ADMIN — MINERAL SUPPLEMENT IRON 300 MG / 5 ML STRENGTH LIQUID 100 PER BOX UNFLAVORED 300 MG: at 21:54

## 2022-01-01 RX ADMIN — PROPOFOL 20 MCG/KG/MIN: 10 INJECTION, EMULSION INTRAVENOUS at 13:21

## 2022-01-01 RX ADMIN — PIPERACILLIN SODIUM AND TAZOBACTAM SODIUM 3.38 G: 3; .375 INJECTION, POWDER, LYOPHILIZED, FOR SOLUTION INTRAVENOUS at 18:04

## 2022-01-01 RX ADMIN — HYDROMORPHONE HYDROCHLORIDE 8 MG: 2 TABLET ORAL at 06:27

## 2022-01-01 RX ADMIN — VANCOMYCIN HYDROCHLORIDE 500 MG: 500 INJECTION, POWDER, LYOPHILIZED, FOR SOLUTION INTRAVENOUS at 22:06

## 2022-01-01 RX ADMIN — PHENYLEPHRINE HYDROCHLORIDE 100 MCG: 10 INJECTION INTRAVENOUS at 18:05

## 2022-01-01 RX ADMIN — Medication 1 SPRAY: at 22:56

## 2022-01-01 RX ADMIN — VANCOMYCIN HYDROCHLORIDE 500 MG: 500 INJECTION, POWDER, LYOPHILIZED, FOR SOLUTION INTRAVENOUS at 15:08

## 2022-01-01 RX ADMIN — FAMOTIDINE 20 MG: 20 TABLET ORAL at 20:47

## 2022-01-01 RX ADMIN — BRIMONIDINE TARTRATE 1 DROP: 2 SOLUTION/ DROPS OPHTHALMIC at 08:16

## 2022-01-01 RX ADMIN — FAMOTIDINE 20 MG: 20 TABLET ORAL at 22:28

## 2022-01-01 RX ADMIN — NITROGLYCERIN 1 INCH: 20 OINTMENT TOPICAL at 12:00

## 2022-01-01 RX ADMIN — GLYCOPYRROLATE 0.4 MG: 0.2 INJECTION INTRAMUSCULAR; INTRAVENOUS at 08:20

## 2022-01-01 RX ADMIN — LATANOPROST 1 DROP: 50 SOLUTION OPHTHALMIC at 17:04

## 2022-01-01 RX ADMIN — HYDRALAZINE HYDROCHLORIDE 50 MG: 50 TABLET, FILM COATED ORAL at 10:01

## 2022-01-01 RX ADMIN — BRIMONIDINE TARTRATE 1 DROP: 2 SOLUTION/ DROPS OPHTHALMIC at 22:40

## 2022-01-01 RX ADMIN — Medication 1 SPRAY: at 08:16

## 2022-01-01 RX ADMIN — INSULIN LISPRO 1 UNITS: 100 INJECTION, SOLUTION INTRAVENOUS; SUBCUTANEOUS at 11:47

## 2022-01-01 RX ADMIN — METOPROLOL SUCCINATE 50 MG: 50 TABLET, EXTENDED RELEASE ORAL at 09:04

## 2022-01-01 RX ADMIN — FAMOTIDINE 20 MG: 20 TABLET ORAL at 21:30

## 2022-01-01 RX ADMIN — SODIUM CHLORIDE, PRESERVATIVE FREE 10 ML: 5 INJECTION INTRAVENOUS at 22:02

## 2022-01-01 RX ADMIN — HYDRALAZINE HYDROCHLORIDE 50 MG: 50 TABLET, FILM COATED ORAL at 11:16

## 2022-01-01 RX ADMIN — PIPERACILLIN SODIUM AND TAZOBACTAM SODIUM 3.38 G: 3; .375 INJECTION, POWDER, LYOPHILIZED, FOR SOLUTION INTRAVENOUS at 14:40

## 2022-01-01 RX ADMIN — LATANOPROST 1 DROP: 50 SOLUTION OPHTHALMIC at 18:32

## 2022-01-01 RX ADMIN — BRIMONIDINE TARTRATE 1 DROP: 2 SOLUTION/ DROPS OPHTHALMIC at 21:44

## 2022-01-01 RX ADMIN — BRIMONIDINE TARTRATE 1 DROP: 2 SOLUTION OPHTHALMIC at 10:29

## 2022-01-01 RX ADMIN — AMLODIPINE BESYLATE 10 MG: 5 TABLET ORAL at 09:18

## 2022-01-01 RX ADMIN — INSULIN LISPRO 9 UNITS: 100 INJECTION, SOLUTION INTRAVENOUS; SUBCUTANEOUS at 12:18

## 2022-01-01 RX ADMIN — METOPROLOL SUCCINATE 50 MG: 50 TABLET, EXTENDED RELEASE ORAL at 22:05

## 2022-01-01 RX ADMIN — INSULIN GLARGINE 10 UNITS: 100 INJECTION, SOLUTION SUBCUTANEOUS at 22:33

## 2022-01-01 RX ADMIN — SODIUM CHLORIDE 3 G: 900 INJECTION INTRAVENOUS at 14:10

## 2022-01-01 RX ADMIN — SODIUM CHLORIDE 3 G: 900 INJECTION INTRAVENOUS at 22:17

## 2022-01-01 RX ADMIN — PROPOFOL 20 MCG/KG/MIN: 10 INJECTION, EMULSION INTRAVENOUS at 12:46

## 2022-01-01 RX ADMIN — INSULIN LISPRO 2 UNITS: 100 INJECTION, SOLUTION INTRAVENOUS; SUBCUTANEOUS at 05:49

## 2022-01-01 RX ADMIN — METOPROLOL TARTRATE 50 MG: 50 TABLET, FILM COATED ORAL at 22:34

## 2022-01-01 RX ADMIN — GABAPENTIN 300 MG: 300 CAPSULE ORAL at 20:59

## 2022-01-01 RX ADMIN — BRIMONIDINE TARTRATE 1 DROP: 2 SOLUTION OPHTHALMIC at 01:46

## 2022-01-01 RX ADMIN — HEPARIN SODIUM 5000 UNITS: 5000 INJECTION INTRAVENOUS; SUBCUTANEOUS at 00:29

## 2022-01-01 RX ADMIN — HYDROMORPHONE HYDROCHLORIDE 1 MG: 1 INJECTION, SOLUTION INTRAMUSCULAR; INTRAVENOUS; SUBCUTANEOUS at 08:19

## 2022-01-01 RX ADMIN — FUROSEMIDE 20 MG: 10 INJECTION, SOLUTION INTRAMUSCULAR; INTRAVENOUS at 09:53

## 2022-01-01 RX ADMIN — PIPERACILLIN SODIUM AND TAZOBACTAM SODIUM 3.38 G: 3; .375 INJECTION, POWDER, LYOPHILIZED, FOR SOLUTION INTRAVENOUS at 10:48

## 2022-01-01 RX ADMIN — LOPERAMIDE HYDROCHLORIDE 2 MG: 2 CAPSULE ORAL at 15:09

## 2022-01-01 RX ADMIN — INSULIN LISPRO 3 UNITS: 100 INJECTION, SOLUTION INTRAVENOUS; SUBCUTANEOUS at 17:11

## 2022-01-01 RX ADMIN — INSULIN LISPRO 4 UNITS: 100 INJECTION, SOLUTION INTRAVENOUS; SUBCUTANEOUS at 11:51

## 2022-01-01 RX ADMIN — MEROPENEM 1 G: 1 INJECTION, POWDER, FOR SOLUTION INTRAVENOUS at 23:52

## 2022-01-01 RX ADMIN — HYDRALAZINE HYDROCHLORIDE 12.5 MG: 25 TABLET, FILM COATED ORAL at 08:48

## 2022-01-01 RX ADMIN — INSULIN GLARGINE 10 UNITS: 100 INJECTION, SOLUTION SUBCUTANEOUS at 22:12

## 2022-01-01 RX ADMIN — HEPARIN SODIUM 5000 UNITS: 5000 INJECTION INTRAVENOUS; SUBCUTANEOUS at 00:30

## 2022-01-01 RX ADMIN — FUROSEMIDE 40 MG: 10 INJECTION, SOLUTION INTRAMUSCULAR; INTRAVENOUS at 20:20

## 2022-01-01 RX ADMIN — FUROSEMIDE 20 MG: 10 INJECTION, SOLUTION INTRAMUSCULAR; INTRAVENOUS at 11:36

## 2022-01-01 RX ADMIN — DICYCLOMINE HYDROCHLORIDE 10 MG: 10 CAPSULE ORAL at 22:06

## 2022-01-01 RX ADMIN — PHYTONADIONE 10 MG: 10 INJECTION, EMULSION INTRAMUSCULAR; INTRAVENOUS; SUBCUTANEOUS at 10:08

## 2022-01-01 RX ADMIN — ASPIRIN 81 MG CHEWABLE TABLET 81 MG: 81 TABLET CHEWABLE at 10:22

## 2022-01-01 RX ADMIN — SODIUM CHLORIDE, PRESERVATIVE FREE 10 ML: 5 INJECTION INTRAVENOUS at 21:25

## 2022-01-01 RX ADMIN — FERROUS SULFATE TAB 325 MG (65 MG ELEMENTAL FE) 325 MG: 325 (65 FE) TAB at 08:55

## 2022-01-01 RX ADMIN — ACETAMINOPHEN ORAL SOLUTION 650 MG: 650 SOLUTION ORAL at 00:05

## 2022-01-01 RX ADMIN — INSULIN GLARGINE 15 UNITS: 100 INJECTION, SOLUTION SUBCUTANEOUS at 22:00

## 2022-01-01 RX ADMIN — INSULIN LISPRO 3 UNITS: 100 INJECTION, SOLUTION INTRAVENOUS; SUBCUTANEOUS at 00:57

## 2022-01-01 RX ADMIN — BRIMONIDINE TARTRATE 1 DROP: 2 SOLUTION OPHTHALMIC at 17:03

## 2022-01-01 RX ADMIN — FERROUS SULFATE TAB 325 MG (65 MG ELEMENTAL FE) 325 MG: 325 (65 FE) TAB at 20:20

## 2022-01-01 RX ADMIN — INSULIN GLARGINE 32 UNITS: 100 INJECTION, SOLUTION SUBCUTANEOUS at 22:51

## 2022-01-01 RX ADMIN — INSULIN LISPRO 6 UNITS: 100 INJECTION, SOLUTION INTRAVENOUS; SUBCUTANEOUS at 02:26

## 2022-01-01 RX ADMIN — INSULIN GLARGINE 10 UNITS: 100 INJECTION, SOLUTION SUBCUTANEOUS at 00:14

## 2022-01-01 RX ADMIN — METOPROLOL SUCCINATE 50 MG: 50 TABLET, EXTENDED RELEASE ORAL at 08:21

## 2022-01-01 RX ADMIN — METOPROLOL TARTRATE 12.5 MG: 25 TABLET, FILM COATED ORAL at 20:30

## 2022-01-01 RX ADMIN — BRIMONIDINE TARTRATE 1 DROP: 2 SOLUTION OPHTHALMIC at 18:17

## 2022-01-01 RX ADMIN — Medication 1 SPRAY: at 21:21

## 2022-01-01 RX ADMIN — INSULIN LISPRO 9 UNITS: 100 INJECTION, SOLUTION INTRAVENOUS; SUBCUTANEOUS at 00:45

## 2022-01-01 RX ADMIN — WHITE PETROLATUM,ZINC OXIDE: 20; 75 PASTE TOPICAL at 22:09

## 2022-01-01 RX ADMIN — INSULIN LISPRO 10 UNITS: 100 INJECTION, SOLUTION INTRAVENOUS; SUBCUTANEOUS at 14:55

## 2022-01-01 RX ADMIN — HYDRALAZINE HYDROCHLORIDE 12.5 MG: 25 TABLET, FILM COATED ORAL at 15:40

## 2022-01-01 RX ADMIN — FLUCONAZOLE 200 MG: 2 INJECTION, SOLUTION INTRAVENOUS at 12:49

## 2022-01-01 RX ADMIN — ASPIRIN 81 MG CHEWABLE TABLET 81 MG: 81 TABLET CHEWABLE at 09:19

## 2022-01-01 RX ADMIN — INSULIN GLARGINE 25 UNITS: 100 INJECTION, SOLUTION SUBCUTANEOUS at 21:18

## 2022-01-01 RX ADMIN — GABAPENTIN 300 MG: 300 CAPSULE ORAL at 10:29

## 2022-01-01 RX ADMIN — GLYCOPYRROLATE 0.4 MG: 0.2 INJECTION INTRAMUSCULAR; INTRAVENOUS at 06:27

## 2022-01-01 RX ADMIN — PSYLLIUM HUSK 1 PACKET: 3.4 POWDER ORAL at 21:25

## 2022-01-01 RX ADMIN — SODIUM CHLORIDE 3 G: 900 INJECTION INTRAVENOUS at 21:28

## 2022-01-01 RX ADMIN — INSULIN LISPRO 4 UNITS: 100 INJECTION, SOLUTION INTRAVENOUS; SUBCUTANEOUS at 11:37

## 2022-01-01 RX ADMIN — PIPERACILLIN SODIUM AND TAZOBACTAM SODIUM 3.38 G: 3; .375 INJECTION, POWDER, LYOPHILIZED, FOR SOLUTION INTRAVENOUS at 06:03

## 2022-01-01 RX ADMIN — VANCOMYCIN HYDROCHLORIDE 750 MG: 750 INJECTION, POWDER, LYOPHILIZED, FOR SOLUTION INTRAVENOUS at 03:30

## 2022-01-01 RX ADMIN — LABETALOL HYDROCHLORIDE 10 MG: 5 INJECTION, SOLUTION INTRAVENOUS at 10:12

## 2022-01-01 RX ADMIN — INSULIN LISPRO 6 UNITS: 100 INJECTION, SOLUTION INTRAVENOUS; SUBCUTANEOUS at 22:02

## 2022-01-01 RX ADMIN — GABAPENTIN 300 MG: 300 CAPSULE ORAL at 10:36

## 2022-01-01 RX ADMIN — Medication 2 TABLET: at 08:00

## 2022-01-01 RX ADMIN — INSULIN GLARGINE 22 UNITS: 100 INJECTION, SOLUTION SUBCUTANEOUS at 09:00

## 2022-01-01 RX ADMIN — INSULIN LISPRO 4 UNITS: 100 INJECTION, SOLUTION INTRAVENOUS; SUBCUTANEOUS at 11:53

## 2022-01-01 RX ADMIN — HEPARIN SODIUM 5000 UNITS: 5000 INJECTION INTRAVENOUS; SUBCUTANEOUS at 16:44

## 2022-01-01 RX ADMIN — Medication 1 SPRAY: at 20:50

## 2022-01-01 RX ADMIN — BRIMONIDINE TARTRATE 1 DROP: 2 SOLUTION OPHTHALMIC at 09:35

## 2022-01-01 RX ADMIN — CHOLESTYRAMINE 4 G: 4 POWDER, FOR SUSPENSION ORAL at 08:09

## 2022-01-01 RX ADMIN — LORAZEPAM 1 MG: 2 INJECTION INTRAMUSCULAR at 21:33

## 2022-01-01 RX ADMIN — SODIUM BICARBONATE 50 MEQ: 84 INJECTION, SOLUTION INTRAVENOUS at 11:49

## 2022-01-01 RX ADMIN — BRIMONIDINE TARTRATE 1 DROP: 2 SOLUTION/ DROPS OPHTHALMIC at 21:05

## 2022-01-01 RX ADMIN — ACETAMINOPHEN 650 MG: 325 TABLET ORAL at 14:38

## 2022-01-01 RX ADMIN — BRIMONIDINE TARTRATE 1 DROP: 2 SOLUTION OPHTHALMIC at 10:34

## 2022-01-01 RX ADMIN — SODIUM CHLORIDE 3 G: 900 INJECTION INTRAVENOUS at 14:02

## 2022-01-01 RX ADMIN — INSULIN GLARGINE 10 UNITS: 100 INJECTION, SOLUTION SUBCUTANEOUS at 22:00

## 2022-01-01 RX ADMIN — INSULIN LISPRO 3 UNITS: 100 INJECTION, SOLUTION INTRAVENOUS; SUBCUTANEOUS at 18:15

## 2022-01-01 RX ADMIN — ACETAMINOPHEN 650 MG: 650 SOLUTION ORAL at 02:22

## 2022-01-01 RX ADMIN — PIPERACILLIN AND TAZOBACTAM 3.38 G: 3; .375 INJECTION, POWDER, LYOPHILIZED, FOR SOLUTION INTRAVENOUS at 04:44

## 2022-01-01 RX ADMIN — BRIMONIDINE TARTRATE 1 DROP: 2 SOLUTION/ DROPS OPHTHALMIC at 22:58

## 2022-01-01 RX ADMIN — Medication 1 SPRAY: at 08:07

## 2022-01-01 RX ADMIN — PROPOFOL 30 MCG/KG/MIN: 10 INJECTION, EMULSION INTRAVENOUS at 13:31

## 2022-01-01 RX ADMIN — PIPERACILLIN AND TAZOBACTAM 3.38 G: 3; .375 INJECTION, POWDER, LYOPHILIZED, FOR SOLUTION INTRAVENOUS at 05:44

## 2022-01-01 RX ADMIN — LORAZEPAM 1 MG: 2 INJECTION INTRAMUSCULAR at 12:19

## 2022-01-01 RX ADMIN — GABAPENTIN 300 MG: 300 CAPSULE ORAL at 17:09

## 2022-01-01 RX ADMIN — VANCOMYCIN HYDROCHLORIDE 500 MG: 500 INJECTION, POWDER, LYOPHILIZED, FOR SOLUTION INTRAVENOUS at 10:14

## 2022-01-01 RX ADMIN — HYDRALAZINE HYDROCHLORIDE 50 MG: 50 TABLET, FILM COATED ORAL at 08:47

## 2022-01-01 RX ADMIN — ASPIRIN 81 MG: 81 TABLET, COATED ORAL at 09:29

## 2022-01-01 RX ADMIN — TRAMADOL HYDROCHLORIDE 50 MG: 50 TABLET, COATED ORAL at 20:53

## 2022-01-01 RX ADMIN — AMLODIPINE BESYLATE 5 MG: 5 TABLET ORAL at 16:13

## 2022-01-01 RX ADMIN — GLYCOPYRROLATE 0.4 MG: 0.2 INJECTION INTRAMUSCULAR; INTRAVENOUS at 09:03

## 2022-01-01 RX ADMIN — LATANOPROST 1 DROP: 50 SOLUTION OPHTHALMIC at 18:04

## 2022-01-01 RX ADMIN — FLUCONAZOLE 200 MG: 200 INJECTION, SOLUTION INTRAVENOUS at 08:30

## 2022-01-01 RX ADMIN — FAMOTIDINE 20 MG: 20 TABLET ORAL at 20:50

## 2022-01-01 RX ADMIN — BRIMONIDINE TARTRATE 1 DROP: 2 SOLUTION OPHTHALMIC at 21:13

## 2022-01-01 RX ADMIN — PIPERACILLIN SODIUM AND TAZOBACTAM SODIUM 3.38 G: 3; .375 INJECTION, POWDER, LYOPHILIZED, FOR SOLUTION INTRAVENOUS at 00:05

## 2022-01-01 RX ADMIN — CEFTAZIDIME, AVIBACTAM 2.5 G: 2; .5 POWDER, FOR SOLUTION INTRAVENOUS at 21:55

## 2022-01-01 RX ADMIN — INSULIN LISPRO 1 UNITS: 100 INJECTION, SOLUTION INTRAVENOUS; SUBCUTANEOUS at 18:00

## 2022-01-01 RX ADMIN — SODIUM CHLORIDE, PRESERVATIVE FREE 10 ML: 5 INJECTION INTRAVENOUS at 06:28

## 2022-01-01 RX ADMIN — SODIUM CHLORIDE 3 G: 900 INJECTION INTRAVENOUS at 20:47

## 2022-01-01 RX ADMIN — SODIUM CHLORIDE 3 G: 900 INJECTION INTRAVENOUS at 21:00

## 2022-01-01 RX ADMIN — NITROGLYCERIN 1 INCH: 20 OINTMENT TOPICAL at 06:10

## 2022-01-01 RX ADMIN — INSULIN LISPRO 4 UNITS: 100 INJECTION, SOLUTION INTRAVENOUS; SUBCUTANEOUS at 07:20

## 2022-01-01 RX ADMIN — AMLODIPINE BESYLATE 5 MG: 5 TABLET ORAL at 18:46

## 2022-01-01 RX ADMIN — PIPERACILLIN AND TAZOBACTAM 3.38 G: 3; .375 INJECTION, POWDER, LYOPHILIZED, FOR SOLUTION INTRAVENOUS at 12:24

## 2022-01-01 RX ADMIN — FUROSEMIDE 40 MG: 10 INJECTION, SOLUTION INTRAMUSCULAR; INTRAVENOUS at 10:07

## 2022-01-01 RX ADMIN — Medication 1 SPRAY: at 08:32

## 2022-01-01 RX ADMIN — BRIMONIDINE TARTRATE 1 DROP: 2 SOLUTION OPHTHALMIC at 21:58

## 2022-01-01 RX ADMIN — GLYCOPYRROLATE 0.4 MG: 0.2 INJECTION INTRAMUSCULAR; INTRAVENOUS at 08:39

## 2022-01-01 RX ADMIN — BRIMONIDINE TARTRATE 1 DROP: 2 SOLUTION OPHTHALMIC at 23:34

## 2022-01-01 RX ADMIN — BRIMONIDINE TARTRATE 1 DROP: 2 SOLUTION OPHTHALMIC at 10:58

## 2022-01-01 RX ADMIN — LOPERAMIDE HYDROCHLORIDE 2 MG: 2 CAPSULE ORAL at 16:46

## 2022-01-01 RX ADMIN — FAMOTIDINE 20 MG: 20 TABLET ORAL at 08:45

## 2022-01-01 RX ADMIN — SACUBITRIL AND VALSARTAN 1 TABLET: 24; 26 TABLET, FILM COATED ORAL at 08:48

## 2022-01-01 RX ADMIN — Medication 1 SPRAY: at 09:55

## 2022-01-01 RX ADMIN — GABAPENTIN 300 MG: 300 CAPSULE ORAL at 23:47

## 2022-01-01 RX ADMIN — FERROUS SULFATE TAB 325 MG (65 MG ELEMENTAL FE) 325 MG: 325 (65 FE) TAB at 10:36

## 2022-01-01 RX ADMIN — FLUCYTOSINE 500 MG: 500 CAPSULE ORAL at 13:20

## 2022-01-01 RX ADMIN — SODIUM CHLORIDE 3 G: 900 INJECTION INTRAVENOUS at 14:21

## 2022-01-01 RX ADMIN — CEFEPIME HYDROCHLORIDE 2 G: 2 INJECTION, POWDER, FOR SOLUTION INTRAVENOUS at 22:58

## 2022-01-01 RX ADMIN — GLYCOPYRROLATE 0.1 MG: 0.2 INJECTION INTRAMUSCULAR; INTRAVENOUS at 03:31

## 2022-01-01 RX ADMIN — Medication 1 SPRAY: at 08:49

## 2022-01-01 RX ADMIN — INSULIN LISPRO 2 UNITS: 100 INJECTION, SOLUTION INTRAVENOUS; SUBCUTANEOUS at 23:01

## 2022-01-01 RX ADMIN — TRAMADOL HYDROCHLORIDE 50 MG: 50 TABLET, COATED ORAL at 18:15

## 2022-01-01 RX ADMIN — SODIUM CHLORIDE 100 ML/HR: 9 INJECTION, SOLUTION INTRAVENOUS at 03:17

## 2022-01-01 RX ADMIN — FLUCYTOSINE 500 MG: 500 CAPSULE ORAL at 18:00

## 2022-01-01 RX ADMIN — IPRATROPIUM BROMIDE AND ALBUTEROL SULFATE 3 ML: .5; 3 SOLUTION RESPIRATORY (INHALATION) at 13:58

## 2022-01-01 RX ADMIN — BRIMONIDINE TARTRATE 1 DROP: 2 SOLUTION/ DROPS OPHTHALMIC at 15:55

## 2022-01-01 RX ADMIN — Medication 1 SPRAY: at 16:58

## 2022-01-01 RX ADMIN — PIPERACILLIN SODIUM AND TAZOBACTAM SODIUM 3.38 G: 3; .375 INJECTION, POWDER, LYOPHILIZED, FOR SOLUTION INTRAVENOUS at 08:27

## 2022-01-01 RX ADMIN — Medication 1 SPRAY: at 21:44

## 2022-01-01 RX ADMIN — INSULIN LISPRO 18 UNITS: 100 INJECTION, SOLUTION INTRAVENOUS; SUBCUTANEOUS at 11:57

## 2022-01-01 RX ADMIN — SODIUM CHLORIDE 1000 ML: 9 INJECTION, SOLUTION INTRAVENOUS at 23:51

## 2022-01-01 RX ADMIN — MEROPENEM 1 G: 1 INJECTION, POWDER, FOR SOLUTION INTRAVENOUS at 17:18

## 2022-01-01 RX ADMIN — GABAPENTIN 300 MG: 300 CAPSULE ORAL at 11:11

## 2022-01-01 RX ADMIN — GLYCOPYRROLATE 0.4 MG: 0.2 INJECTION INTRAMUSCULAR; INTRAVENOUS at 10:03

## 2022-01-01 RX ADMIN — INSULIN LISPRO 10 UNITS: 100 INJECTION, SOLUTION INTRAVENOUS; SUBCUTANEOUS at 05:19

## 2022-01-01 RX ADMIN — HYDRALAZINE HYDROCHLORIDE 20 MG: 20 INJECTION, SOLUTION INTRAMUSCULAR; INTRAVENOUS at 02:32

## 2022-01-01 RX ADMIN — BRIMONIDINE TARTRATE 1 DROP: 2 SOLUTION OPHTHALMIC at 21:08

## 2022-01-01 RX ADMIN — LINEZOLID 600 MG: 600 TABLET, FILM COATED ORAL at 21:54

## 2022-01-01 RX ADMIN — FAMOTIDINE 20 MG: 20 TABLET ORAL at 22:59

## 2022-01-01 RX ADMIN — ACETAMINOPHEN 650 MG: 650 SOLUTION ORAL at 17:42

## 2022-01-01 RX ADMIN — ATORVASTATIN CALCIUM 20 MG: 20 TABLET, FILM COATED ORAL at 22:34

## 2022-01-01 RX ADMIN — INSULIN LISPRO 2 UNITS: 100 INJECTION, SOLUTION INTRAVENOUS; SUBCUTANEOUS at 00:00

## 2022-01-01 RX ADMIN — PHENYLEPHRINE HYDROCHLORIDE 50 MCG/MIN: 10 INJECTION INTRAVENOUS at 18:05

## 2022-01-01 RX ADMIN — GABAPENTIN 300 MG: 300 CAPSULE ORAL at 08:06

## 2022-01-01 RX ADMIN — INSULIN GLARGINE 50 UNITS: 100 INJECTION, SOLUTION SUBCUTANEOUS at 22:03

## 2022-01-01 RX ADMIN — GABAPENTIN 300 MG: 300 CAPSULE ORAL at 23:39

## 2022-01-01 RX ADMIN — ATORVASTATIN CALCIUM 20 MG: 20 TABLET, FILM COATED ORAL at 23:59

## 2022-01-01 RX ADMIN — PIPERACILLIN SODIUM AND TAZOBACTAM SODIUM 3.38 G: 3; .375 INJECTION, POWDER, LYOPHILIZED, FOR SOLUTION INTRAVENOUS at 09:58

## 2022-01-01 RX ADMIN — FERROUS SULFATE TAB 325 MG (65 MG ELEMENTAL FE) 325 MG: 325 (65 FE) TAB at 09:06

## 2022-01-01 RX ADMIN — ASPIRIN 81 MG CHEWABLE TABLET 81 MG: 81 TABLET CHEWABLE at 08:01

## 2022-01-01 RX ADMIN — MINERAL SUPPLEMENT IRON 300 MG / 5 ML STRENGTH LIQUID 100 PER BOX UNFLAVORED 300 MG: at 08:44

## 2022-01-01 RX ADMIN — LATANOPROST 1 DROP: 50 SOLUTION OPHTHALMIC at 17:07

## 2022-01-01 RX ADMIN — HYDRALAZINE HYDROCHLORIDE 12.5 MG: 25 TABLET, FILM COATED ORAL at 08:45

## 2022-01-01 RX ADMIN — FLUCONAZOLE 200 MG: 2 INJECTION, SOLUTION INTRAVENOUS at 11:57

## 2022-01-01 RX ADMIN — FAMOTIDINE 20 MG: 20 TABLET ORAL at 22:48

## 2022-01-01 RX ADMIN — HEPARIN SODIUM 5000 UNITS: 5000 INJECTION INTRAVENOUS; SUBCUTANEOUS at 10:23

## 2022-01-01 RX ADMIN — FERROUS SULFATE TAB 325 MG (65 MG ELEMENTAL FE) 325 MG: 325 (65 FE) TAB at 21:09

## 2022-01-01 RX ADMIN — INSULIN LISPRO 4 UNITS: 100 INJECTION, SOLUTION INTRAVENOUS; SUBCUTANEOUS at 19:33

## 2022-01-01 RX ADMIN — FLUCONAZOLE 200 MG: 100 TABLET ORAL at 10:11

## 2022-01-01 RX ADMIN — MINERAL SUPPLEMENT IRON 300 MG / 5 ML STRENGTH LIQUID 100 PER BOX UNFLAVORED 300 MG: at 21:01

## 2022-01-01 RX ADMIN — INSULIN LISPRO 5 UNITS: 100 INJECTION, SOLUTION INTRAVENOUS; SUBCUTANEOUS at 00:41

## 2022-01-01 RX ADMIN — FLUCYTOSINE 500 MG: 500 CAPSULE ORAL at 01:34

## 2022-01-01 RX ADMIN — SODIUM CHLORIDE, PRESERVATIVE FREE 10 ML: 5 INJECTION INTRAVENOUS at 15:31

## 2022-01-01 RX ADMIN — BRIMONIDINE TARTRATE 1 DROP: 2 SOLUTION OPHTHALMIC at 20:47

## 2022-01-01 RX ADMIN — LATANOPROST 1 DROP: 50 SOLUTION OPHTHALMIC at 17:31

## 2022-01-01 RX ADMIN — PIPERACILLIN SODIUM AND TAZOBACTAM SODIUM 3.38 G: 3; .375 INJECTION, POWDER, LYOPHILIZED, FOR SOLUTION INTRAVENOUS at 10:23

## 2022-01-01 RX ADMIN — FAMOTIDINE 20 MG: 20 TABLET ORAL at 09:16

## 2022-01-01 RX ADMIN — CHOLESTYRAMINE 4 G: 4 POWDER, FOR SUSPENSION ORAL at 08:49

## 2022-01-01 RX ADMIN — HYDRALAZINE HYDROCHLORIDE 50 MG: 50 TABLET, FILM COATED ORAL at 08:32

## 2022-01-01 RX ADMIN — PIPERACILLIN SODIUM AND TAZOBACTAM SODIUM 3.38 G: 3; .375 INJECTION, POWDER, LYOPHILIZED, FOR SOLUTION INTRAVENOUS at 08:02

## 2022-01-01 RX ADMIN — MINERAL SUPPLEMENT IRON 300 MG / 5 ML STRENGTH LIQUID 100 PER BOX UNFLAVORED 300 MG: at 10:14

## 2022-01-01 RX ADMIN — GABAPENTIN 300 MG: 300 CAPSULE ORAL at 22:00

## 2022-01-01 RX ADMIN — PIPERACILLIN AND TAZOBACTAM 3.38 G: 3; .375 INJECTION, POWDER, LYOPHILIZED, FOR SOLUTION INTRAVENOUS at 12:26

## 2022-01-01 RX ADMIN — GABAPENTIN 300 MG: 300 CAPSULE ORAL at 20:42

## 2022-01-01 RX ADMIN — INSULIN GLARGINE 32 UNITS: 100 INJECTION, SOLUTION SUBCUTANEOUS at 21:58

## 2022-01-01 RX ADMIN — GLYCOPYRROLATE 0.1 MG: 0.2 INJECTION INTRAMUSCULAR; INTRAVENOUS at 06:18

## 2022-01-01 RX ADMIN — SODIUM CHLORIDE 3 G: 900 INJECTION INTRAVENOUS at 13:54

## 2022-01-01 RX ADMIN — GABAPENTIN 300 MG: 300 CAPSULE ORAL at 23:59

## 2022-01-01 RX ADMIN — LORAZEPAM 1 MG: 2 INJECTION INTRAMUSCULAR at 21:07

## 2022-01-01 RX ADMIN — FAMOTIDINE 20 MG: 20 TABLET ORAL at 20:27

## 2022-01-01 RX ADMIN — HYDRALAZINE HYDROCHLORIDE 12.5 MG: 25 TABLET, FILM COATED ORAL at 22:48

## 2022-01-01 RX ADMIN — INSULIN GLARGINE 32 UNITS: 100 INJECTION, SOLUTION SUBCUTANEOUS at 09:29

## 2022-01-01 RX ADMIN — Medication 1 MG: at 14:28

## 2022-01-01 RX ADMIN — INSULIN LISPRO 2 UNITS: 100 INJECTION, SOLUTION INTRAVENOUS; SUBCUTANEOUS at 23:13

## 2022-01-01 RX ADMIN — NITROGLYCERIN 1 INCH: 20 OINTMENT TOPICAL at 13:05

## 2022-01-01 RX ADMIN — SODIUM CHLORIDE 3 G: 900 INJECTION INTRAVENOUS at 21:14

## 2022-01-01 RX ADMIN — SODIUM CHLORIDE, PRESERVATIVE FREE 10 ML: 5 INJECTION INTRAVENOUS at 23:26

## 2022-01-01 RX ADMIN — LATANOPROST 1 DROP: 50 SOLUTION OPHTHALMIC at 17:20

## 2022-01-01 RX ADMIN — FUROSEMIDE 40 MG: 10 INJECTION, SOLUTION INTRAMUSCULAR; INTRAVENOUS at 20:28

## 2022-01-01 RX ADMIN — HYDRALAZINE HYDROCHLORIDE 12.5 MG: 25 TABLET, FILM COATED ORAL at 09:16

## 2022-01-01 RX ADMIN — HEPARIN SODIUM 5000 UNITS: 5000 INJECTION INTRAVENOUS; SUBCUTANEOUS at 17:40

## 2022-01-01 RX ADMIN — PIPERACILLIN SODIUM AND TAZOBACTAM SODIUM 3.38 G: 3; .375 INJECTION, POWDER, LYOPHILIZED, FOR SOLUTION INTRAVENOUS at 09:12

## 2022-01-01 RX ADMIN — HEPARIN SODIUM 5000 UNITS: 5000 INJECTION INTRAVENOUS; SUBCUTANEOUS at 16:12

## 2022-01-01 RX ADMIN — BARIUM SULFATE 80 ML: 0.81 POWDER, FOR SUSPENSION ORAL at 11:50

## 2022-01-01 RX ADMIN — PIPERACILLIN AND TAZOBACTAM 3.38 G: 3; .375 INJECTION, POWDER, LYOPHILIZED, FOR SOLUTION INTRAVENOUS at 20:42

## 2022-01-01 RX ADMIN — HYDRALAZINE HYDROCHLORIDE 50 MG: 50 TABLET, FILM COATED ORAL at 08:54

## 2022-01-01 RX ADMIN — INSULIN LISPRO 3 UNITS: 100 INJECTION, SOLUTION INTRAVENOUS; SUBCUTANEOUS at 18:31

## 2022-01-01 RX ADMIN — INSULIN LISPRO 9 UNITS: 100 INJECTION, SOLUTION INTRAVENOUS; SUBCUTANEOUS at 19:41

## 2022-01-01 RX ADMIN — BRIMONIDINE TARTRATE 1 DROP: 2 SOLUTION OPHTHALMIC at 09:18

## 2022-01-01 RX ADMIN — FLUCONAZOLE 200 MG: 2 INJECTION, SOLUTION INTRAVENOUS at 16:12

## 2022-01-01 RX ADMIN — HYDROMORPHONE HYDROCHLORIDE 1 MG: 1 INJECTION, SOLUTION INTRAMUSCULAR; INTRAVENOUS; SUBCUTANEOUS at 05:36

## 2022-01-01 RX ADMIN — IPRATROPIUM BROMIDE AND ALBUTEROL SULFATE 3 ML: .5; 3 SOLUTION RESPIRATORY (INHALATION) at 07:06

## 2022-01-01 RX ADMIN — INSULIN LISPRO 5 UNITS: 100 INJECTION, SOLUTION INTRAVENOUS; SUBCUTANEOUS at 01:31

## 2022-01-01 RX ADMIN — DICYCLOMINE HYDROCHLORIDE 10 MG: 10 CAPSULE ORAL at 18:15

## 2022-01-01 RX ADMIN — INSULIN LISPRO 10 UNITS: 100 INJECTION, SOLUTION INTRAVENOUS; SUBCUTANEOUS at 00:47

## 2022-01-01 RX ADMIN — AMLODIPINE BESYLATE 5 MG: 5 TABLET ORAL at 10:33

## 2022-01-01 RX ADMIN — VANCOMYCIN HYDROCHLORIDE 500 MG: 500 INJECTION, POWDER, LYOPHILIZED, FOR SOLUTION INTRAVENOUS at 14:14

## 2022-01-01 RX ADMIN — PIPERACILLIN SODIUM AND TAZOBACTAM SODIUM 3.38 G: 3; .375 INJECTION, POWDER, LYOPHILIZED, FOR SOLUTION INTRAVENOUS at 22:47

## 2022-01-01 RX ADMIN — GABAPENTIN 300 MG: 300 CAPSULE ORAL at 08:48

## 2022-01-01 RX ADMIN — FERROUS SULFATE TAB 325 MG (65 MG ELEMENTAL FE) 325 MG: 325 (65 FE) TAB at 11:45

## 2022-01-01 RX ADMIN — ENOXAPARIN SODIUM 40 MG: 100 INJECTION SUBCUTANEOUS at 09:00

## 2022-01-01 RX ADMIN — MINERAL SUPPLEMENT IRON 300 MG / 5 ML STRENGTH LIQUID 100 PER BOX UNFLAVORED 300 MG: at 23:47

## 2022-01-01 RX ADMIN — SACUBITRIL AND VALSARTAN 1 TABLET: 24; 26 TABLET, FILM COATED ORAL at 09:46

## 2022-01-01 RX ADMIN — SACUBITRIL AND VALSARTAN 1 TABLET: 24; 26 TABLET, FILM COATED ORAL at 08:17

## 2022-01-01 RX ADMIN — Medication 1 MG: at 20:47

## 2022-01-01 RX ADMIN — HYDROMORPHONE HYDROCHLORIDE 1 MG: 1 INJECTION, SOLUTION INTRAMUSCULAR; INTRAVENOUS; SUBCUTANEOUS at 04:53

## 2022-01-01 RX ADMIN — CHOLESTYRAMINE 4 G: 4 POWDER, FOR SUSPENSION ORAL at 16:52

## 2022-01-01 RX ADMIN — SODIUM CHLORIDE, PRESERVATIVE FREE 5 ML: 5 INJECTION INTRAVENOUS at 09:15

## 2022-01-01 RX ADMIN — VASOPRESSIN 2 UNITS: 20 INJECTION INTRAVENOUS at 10:46

## 2022-01-01 RX ADMIN — VANCOMYCIN HYDROCHLORIDE 500 MG: 500 INJECTION, POWDER, LYOPHILIZED, FOR SOLUTION INTRAVENOUS at 03:00

## 2022-01-01 RX ADMIN — FLUCONAZOLE 200 MG: 2 INJECTION, SOLUTION INTRAVENOUS at 15:55

## 2022-01-01 RX ADMIN — ACETAMINOPHEN ORAL SOLUTION 650 MG: 650 SOLUTION ORAL at 05:44

## 2022-01-01 RX ADMIN — SODIUM CHLORIDE, PRESERVATIVE FREE 10 ML: 5 INJECTION INTRAVENOUS at 05:26

## 2022-01-01 RX ADMIN — SODIUM CHLORIDE, PRESERVATIVE FREE 10 ML: 5 INJECTION INTRAVENOUS at 05:43

## 2022-01-01 RX ADMIN — HYDRALAZINE HYDROCHLORIDE 50 MG: 50 TABLET, FILM COATED ORAL at 15:47

## 2022-01-01 RX ADMIN — SODIUM CHLORIDE, PRESERVATIVE FREE 10 ML: 5 INJECTION INTRAVENOUS at 14:47

## 2022-01-01 RX ADMIN — FLUCONAZOLE 200 MG: 2 INJECTION, SOLUTION INTRAVENOUS at 15:49

## 2022-01-01 RX ADMIN — FUROSEMIDE 40 MG: 10 INJECTION, SOLUTION INTRAMUSCULAR; INTRAVENOUS at 04:10

## 2022-01-01 RX ADMIN — MEROPENEM 1 G: 1 INJECTION, POWDER, FOR SOLUTION INTRAVENOUS at 06:10

## 2022-01-01 RX ADMIN — FAMOTIDINE 20 MG: 20 TABLET ORAL at 21:04

## 2022-01-01 RX ADMIN — INSULIN LISPRO 3 UNITS: 100 INJECTION, SOLUTION INTRAVENOUS; SUBCUTANEOUS at 06:03

## 2022-01-01 RX ADMIN — FUROSEMIDE 20 MG: 10 INJECTION, SOLUTION INTRAMUSCULAR; INTRAVENOUS at 21:43

## 2022-01-01 RX ADMIN — INSULIN LISPRO 9 UNITS: 100 INJECTION, SOLUTION INTRAVENOUS; SUBCUTANEOUS at 21:16

## 2022-01-01 RX ADMIN — HYOSCYAMINE SULFATE: 16 SOLUTION at 23:00

## 2022-01-01 RX ADMIN — Medication 1 SPRAY: at 08:57

## 2022-01-01 RX ADMIN — HYDROMORPHONE HYDROCHLORIDE 8 MG: 2 TABLET ORAL at 14:29

## 2022-01-01 RX ADMIN — FAMOTIDINE 20 MG: 20 TABLET ORAL at 08:55

## 2022-01-01 RX ADMIN — PROPOFOL 25 MCG/KG/MIN: 10 INJECTION, EMULSION INTRAVENOUS at 21:06

## 2022-01-01 RX ADMIN — FLUCONAZOLE 200 MG: 2 INJECTION, SOLUTION INTRAVENOUS at 22:02

## 2022-01-01 RX ADMIN — ACETAMINOPHEN 650 MG: 650 SOLUTION ORAL at 22:08

## 2022-01-01 RX ADMIN — INSULIN LISPRO 1 UNITS: 100 INJECTION, SOLUTION INTRAVENOUS; SUBCUTANEOUS at 11:58

## 2022-01-01 RX ADMIN — BRIMONIDINE TARTRATE 1 DROP: 2 SOLUTION OPHTHALMIC at 23:59

## 2022-01-01 RX ADMIN — GLYCOPYRROLATE 0.4 MG: 0.2 INJECTION INTRAMUSCULAR; INTRAVENOUS at 14:28

## 2022-01-01 RX ADMIN — Medication 95 MG: at 17:20

## 2022-01-01 RX ADMIN — INSULIN HUMAN 24 UNITS: 100 INJECTION, SUSPENSION SUBCUTANEOUS at 05:32

## 2022-01-01 RX ADMIN — ENOXAPARIN SODIUM 40 MG: 100 INJECTION SUBCUTANEOUS at 09:20

## 2022-01-01 RX ADMIN — FAMOTIDINE 20 MG: 20 TABLET ORAL at 22:02

## 2022-01-01 RX ADMIN — DEXTROSE MONOHYDRATE 100 ML/HR: 50 INJECTION, SOLUTION INTRAVENOUS at 02:01

## 2022-01-01 RX ADMIN — FUROSEMIDE 40 MG: 10 INJECTION, SOLUTION INTRAMUSCULAR; INTRAVENOUS at 09:39

## 2022-01-01 RX ADMIN — INSULIN LISPRO 1 UNITS: 100 INJECTION, SOLUTION INTRAVENOUS; SUBCUTANEOUS at 11:56

## 2022-01-01 RX ADMIN — PROPOFOL 20 MCG/KG/MIN: 10 INJECTION, EMULSION INTRAVENOUS at 06:03

## 2022-01-01 RX ADMIN — GLYCOPYRROLATE 0.1 MG: 0.2 INJECTION INTRAMUSCULAR; INTRAVENOUS at 09:46

## 2022-01-01 RX ADMIN — ATORVASTATIN CALCIUM 20 MG: 20 TABLET, FILM COATED ORAL at 22:01

## 2022-01-01 RX ADMIN — INSULIN GLARGINE 32 UNITS: 100 INJECTION, SOLUTION SUBCUTANEOUS at 10:43

## 2022-01-01 RX ADMIN — SODIUM CHLORIDE, PRESERVATIVE FREE 10 ML: 5 INJECTION INTRAVENOUS at 14:00

## 2022-01-01 RX ADMIN — SODIUM CHLORIDE 3 G: 900 INJECTION INTRAVENOUS at 06:25

## 2022-01-01 RX ADMIN — BRIMONIDINE TARTRATE 1 DROP: 2 SOLUTION OPHTHALMIC at 16:56

## 2022-01-01 RX ADMIN — SODIUM CHLORIDE 3 G: 900 INJECTION INTRAVENOUS at 00:32

## 2022-01-01 RX ADMIN — HYDROMORPHONE HYDROCHLORIDE 8 MG: 2 TABLET ORAL at 15:36

## 2022-01-01 RX ADMIN — LATANOPROST 1 DROP: 50 SOLUTION OPHTHALMIC at 18:29

## 2022-01-01 RX ADMIN — HEPARIN SODIUM 5000 UNITS: 5000 INJECTION INTRAVENOUS; SUBCUTANEOUS at 10:15

## 2022-01-01 RX ADMIN — PIPERACILLIN SODIUM AND TAZOBACTAM SODIUM 3.38 G: 3; .375 INJECTION, POWDER, LYOPHILIZED, FOR SOLUTION INTRAVENOUS at 22:39

## 2022-01-01 RX ADMIN — GABAPENTIN 300 MG: 300 CAPSULE ORAL at 10:24

## 2022-01-01 RX ADMIN — SODIUM CHLORIDE 3 G: 900 INJECTION INTRAVENOUS at 13:53

## 2022-01-01 RX ADMIN — BARIUM SULFATE 5 ML: 400 SUSPENSION ORAL at 10:00

## 2022-01-01 RX ADMIN — Medication 1 SPRAY: at 08:46

## 2022-01-01 RX ADMIN — LATANOPROST 1 DROP: 50 SOLUTION OPHTHALMIC at 18:21

## 2022-01-01 RX ADMIN — HYDRALAZINE HYDROCHLORIDE 12.5 MG: 25 TABLET, FILM COATED ORAL at 20:48

## 2022-01-01 RX ADMIN — SODIUM CHLORIDE, PRESERVATIVE FREE 10 ML: 5 INJECTION INTRAVENOUS at 05:30

## 2022-01-01 RX ADMIN — BRIMONIDINE TARTRATE 1 DROP: 2 SOLUTION OPHTHALMIC at 10:00

## 2022-01-01 RX ADMIN — HYDRALAZINE HYDROCHLORIDE 50 MG: 50 TABLET, FILM COATED ORAL at 08:48

## 2022-01-01 RX ADMIN — METOPROLOL TARTRATE 12.5 MG: 25 TABLET, FILM COATED ORAL at 08:10

## 2022-01-01 RX ADMIN — FAMOTIDINE 20 MG: 20 TABLET ORAL at 09:43

## 2022-01-01 RX ADMIN — BRIMONIDINE TARTRATE 1 DROP: 2 SOLUTION OPHTHALMIC at 10:48

## 2022-01-01 RX ADMIN — HYOSCYAMINE SULFATE: 16 SOLUTION at 09:22

## 2022-01-01 RX ADMIN — CHOLESTYRAMINE 4 G: 4 POWDER, FOR SUSPENSION ORAL at 10:25

## 2022-01-01 RX ADMIN — AMLODIPINE BESYLATE 5 MG: 5 TABLET ORAL at 09:19

## 2022-01-01 RX ADMIN — HYDRALAZINE HYDROCHLORIDE 10 MG: 20 INJECTION, SOLUTION INTRAMUSCULAR; INTRAVENOUS at 06:50

## 2022-01-01 RX ADMIN — BRIMONIDINE TARTRATE 1 DROP: 2 SOLUTION OPHTHALMIC at 15:48

## 2022-01-01 RX ADMIN — DEXTROSE MONOHYDRATE 100 ML/HR: 50 INJECTION, SOLUTION INTRAVENOUS at 03:57

## 2022-01-01 RX ADMIN — SODIUM CHLORIDE, PRESERVATIVE FREE 10 ML: 5 INJECTION INTRAVENOUS at 06:10

## 2022-01-01 RX ADMIN — CEFTRIAXONE SODIUM 1 G: 1 INJECTION, POWDER, FOR SOLUTION INTRAMUSCULAR; INTRAVENOUS at 17:35

## 2022-01-01 RX ADMIN — CHOLESTYRAMINE 4 G: 4 POWDER, FOR SUSPENSION ORAL at 21:11

## 2022-01-01 RX ADMIN — LATANOPROST 1 DROP: 50 SOLUTION OPHTHALMIC at 18:03

## 2022-01-01 RX ADMIN — GLYCOPYRROLATE 0.1 MG: 0.2 INJECTION INTRAMUSCULAR; INTRAVENOUS at 21:07

## 2022-01-01 RX ADMIN — INSULIN LISPRO 2 UNITS: 100 INJECTION, SOLUTION INTRAVENOUS; SUBCUTANEOUS at 05:50

## 2022-01-01 RX ADMIN — LABETALOL HYDROCHLORIDE 20 MG: 5 INJECTION, SOLUTION INTRAVENOUS at 02:55

## 2022-01-01 RX ADMIN — HEPARIN SODIUM 5000 UNITS: 5000 INJECTION INTRAVENOUS; SUBCUTANEOUS at 00:13

## 2022-01-01 RX ADMIN — HYDROMORPHONE HYDROCHLORIDE 1 MG: 1 INJECTION, SOLUTION INTRAMUSCULAR; INTRAVENOUS; SUBCUTANEOUS at 01:34

## 2022-01-01 RX ADMIN — CHOLESTYRAMINE 4 G: 4 POWDER, FOR SUSPENSION ORAL at 08:46

## 2022-01-01 RX ADMIN — GABAPENTIN 300 MG: 300 CAPSULE ORAL at 10:02

## 2022-01-01 RX ADMIN — Medication 1 SPRAY: at 16:56

## 2022-01-01 RX ADMIN — PROPOFOL 20 MCG/KG/MIN: 10 INJECTION, EMULSION INTRAVENOUS at 03:55

## 2022-01-01 RX ADMIN — INSULIN GLARGINE 10 UNITS: 100 INJECTION, SOLUTION SUBCUTANEOUS at 21:11

## 2022-01-01 RX ADMIN — TRAMADOL HYDROCHLORIDE 50 MG: 50 TABLET, COATED ORAL at 14:26

## 2022-01-01 RX ADMIN — LATANOPROST 1 DROP: 50 SOLUTION OPHTHALMIC at 17:42

## 2022-01-01 RX ADMIN — PIPERACILLIN AND TAZOBACTAM 3.38 G: 3; .375 INJECTION, POWDER, LYOPHILIZED, FOR SOLUTION INTRAVENOUS at 13:00

## 2022-01-01 RX ADMIN — INSULIN LISPRO 6 UNITS: 100 INJECTION, SOLUTION INTRAVENOUS; SUBCUTANEOUS at 21:51

## 2022-01-01 RX ADMIN — LIDOCAINE HYDROCHLORIDE 1 ML: 40 SOLUTION TOPICAL at 21:36

## 2022-01-01 RX ADMIN — FERROUS SULFATE TAB 325 MG (65 MG ELEMENTAL FE) 325 MG: 325 (65 FE) TAB at 09:19

## 2022-01-01 RX ADMIN — HYDRALAZINE HYDROCHLORIDE 50 MG: 50 TABLET, FILM COATED ORAL at 08:17

## 2022-01-01 RX ADMIN — PIPERACILLIN SODIUM AND TAZOBACTAM SODIUM 3.38 G: 3; .375 INJECTION, POWDER, LYOPHILIZED, FOR SOLUTION INTRAVENOUS at 14:00

## 2022-01-01 RX ADMIN — SODIUM CHLORIDE, PRESERVATIVE FREE 10 ML: 5 INJECTION INTRAVENOUS at 15:21

## 2022-01-01 RX ADMIN — BRIMONIDINE TARTRATE 1 DROP: 2 SOLUTION/ DROPS OPHTHALMIC at 09:53

## 2022-01-01 RX ADMIN — GABAPENTIN 300 MG: 300 CAPSULE ORAL at 20:49

## 2022-01-01 RX ADMIN — GABAPENTIN 300 MG: 300 CAPSULE ORAL at 17:26

## 2022-01-01 RX ADMIN — INSULIN LISPRO 2 UNITS: 100 INJECTION, SOLUTION INTRAVENOUS; SUBCUTANEOUS at 23:48

## 2022-01-01 RX ADMIN — PROPOFOL 25 MCG/KG/MIN: 10 INJECTION, EMULSION INTRAVENOUS at 00:14

## 2022-01-01 RX ADMIN — HYDROMORPHONE HYDROCHLORIDE 1 MG: 1 INJECTION, SOLUTION INTRAMUSCULAR; INTRAVENOUS; SUBCUTANEOUS at 12:48

## 2022-01-01 RX ADMIN — BRIMONIDINE TARTRATE 1 DROP: 2 SOLUTION/ DROPS OPHTHALMIC at 21:15

## 2022-01-01 RX ADMIN — CEFTOLOZANE AND TAZOBACTAM 3 G: 1; .5 INJECTION, POWDER, LYOPHILIZED, FOR SOLUTION INTRAVENOUS at 05:53

## 2022-01-01 RX ADMIN — INSULIN LISPRO 9 UNITS: 100 INJECTION, SOLUTION INTRAVENOUS; SUBCUTANEOUS at 05:41

## 2022-01-01 RX ADMIN — MINERAL SUPPLEMENT IRON 300 MG / 5 ML STRENGTH LIQUID 100 PER BOX UNFLAVORED 300 MG: at 10:54

## 2022-01-01 RX ADMIN — HYDRALAZINE HYDROCHLORIDE 50 MG: 50 TABLET, FILM COATED ORAL at 17:28

## 2022-01-01 RX ADMIN — Medication 1 SPRAY: at 15:55

## 2022-01-01 RX ADMIN — HYDRALAZINE HYDROCHLORIDE 50 MG: 50 TABLET, FILM COATED ORAL at 15:59

## 2022-01-01 RX ADMIN — LIDOCAINE HYDROCHLORIDE 100 MG: 20 INJECTION, SOLUTION EPIDURAL; INFILTRATION; INTRACAUDAL; PERINEURAL at 10:32

## 2022-01-01 RX ADMIN — HYDROMORPHONE HYDROCHLORIDE 1 MG: 1 INJECTION, SOLUTION INTRAMUSCULAR; INTRAVENOUS; SUBCUTANEOUS at 14:14

## 2022-01-01 RX ADMIN — PROPOFOL 10 MG: 10 INJECTION, EMULSION INTRAVENOUS at 13:10

## 2022-01-01 RX ADMIN — FERROUS SULFATE TAB 325 MG (65 MG ELEMENTAL FE) 325 MG: 325 (65 FE) TAB at 10:24

## 2022-01-01 RX ADMIN — METOPROLOL TARTRATE 12.5 MG: 25 TABLET, FILM COATED ORAL at 08:51

## 2022-01-01 RX ADMIN — HYDRALAZINE HYDROCHLORIDE 12.5 MG: 25 TABLET, FILM COATED ORAL at 09:52

## 2022-01-01 RX ADMIN — FUROSEMIDE 40 MG: 10 INJECTION, SOLUTION INTRAMUSCULAR; INTRAVENOUS at 08:46

## 2022-01-01 RX ADMIN — LORAZEPAM 1 MG: 2 INJECTION INTRAMUSCULAR at 00:21

## 2022-01-01 RX ADMIN — HEPARIN SODIUM 5000 UNITS: 5000 INJECTION INTRAVENOUS; SUBCUTANEOUS at 12:49

## 2022-01-01 RX ADMIN — HEPARIN SODIUM 5000 UNITS: 5000 INJECTION INTRAVENOUS; SUBCUTANEOUS at 01:02

## 2022-01-01 RX ADMIN — BRIMONIDINE TARTRATE 1 DROP: 2 SOLUTION OPHTHALMIC at 22:06

## 2022-01-01 RX ADMIN — INSULIN LISPRO 9 UNITS: 100 INJECTION, SOLUTION INTRAVENOUS; SUBCUTANEOUS at 08:37

## 2022-01-01 RX ADMIN — SODIUM CHLORIDE, PRESERVATIVE FREE 10 ML: 5 INJECTION INTRAVENOUS at 06:07

## 2022-01-01 RX ADMIN — LATANOPROST 1 DROP: 50 SOLUTION OPHTHALMIC at 20:54

## 2022-01-01 RX ADMIN — INSULIN GLARGINE 32 UNITS: 100 INJECTION, SOLUTION SUBCUTANEOUS at 21:25

## 2022-01-01 RX ADMIN — METOPROLOL SUCCINATE 50 MG: 50 TABLET, EXTENDED RELEASE ORAL at 09:17

## 2022-01-01 RX ADMIN — VANCOMYCIN HYDROCHLORIDE 500 MG: 500 INJECTION, POWDER, LYOPHILIZED, FOR SOLUTION INTRAVENOUS at 15:58

## 2022-01-01 RX ADMIN — GLYCOPYRROLATE 0.4 MG: 0.2 INJECTION INTRAMUSCULAR; INTRAVENOUS at 21:07

## 2022-01-01 RX ADMIN — INSULIN GLARGINE 32 UNITS: 100 INJECTION, SOLUTION SUBCUTANEOUS at 08:15

## 2022-01-01 RX ADMIN — HYDRALAZINE HYDROCHLORIDE 12.5 MG: 25 TABLET, FILM COATED ORAL at 09:46

## 2022-01-01 RX ADMIN — HYOSCYAMINE SULFATE: 16 SOLUTION at 20:42

## 2022-01-01 RX ADMIN — PROPOFOL 25 MCG/KG/MIN: 10 INJECTION, EMULSION INTRAVENOUS at 14:00

## 2022-01-01 RX ADMIN — SODIUM CHLORIDE, PRESERVATIVE FREE 10 ML: 5 INJECTION INTRAVENOUS at 16:48

## 2022-01-01 RX ADMIN — METOPROLOL TARTRATE 50 MG: 50 TABLET, FILM COATED ORAL at 23:39

## 2022-01-01 RX ADMIN — MEROPENEM 1 G: 1 INJECTION, POWDER, FOR SOLUTION INTRAVENOUS at 22:52

## 2022-01-01 RX ADMIN — METOPROLOL TARTRATE 50 MG: 50 TABLET, FILM COATED ORAL at 08:48

## 2022-01-01 RX ADMIN — PIPERACILLIN SODIUM AND TAZOBACTAM SODIUM 3.38 G: 3; .375 INJECTION, POWDER, LYOPHILIZED, FOR SOLUTION INTRAVENOUS at 18:25

## 2022-01-01 RX ADMIN — BRIMONIDINE TARTRATE 1 DROP: 2 SOLUTION OPHTHALMIC at 17:28

## 2022-01-01 RX ADMIN — FAMOTIDINE 20 MG: 20 TABLET ORAL at 20:43

## 2022-01-01 RX ADMIN — AMLODIPINE BESYLATE 10 MG: 5 TABLET ORAL at 09:03

## 2022-01-01 RX ADMIN — LIDOCAINE HYDROCHLORIDE: 40 SOLUTION TOPICAL at 10:59

## 2022-01-01 RX ADMIN — INSULIN GLARGINE 30 UNITS: 100 INJECTION, SOLUTION SUBCUTANEOUS at 21:25

## 2022-01-01 RX ADMIN — FERROUS SULFATE TAB 325 MG (65 MG ELEMENTAL FE) 325 MG: 325 (65 FE) TAB at 22:09

## 2022-01-01 RX ADMIN — METOPROLOL TARTRATE 12.5 MG: 25 TABLET, FILM COATED ORAL at 09:40

## 2022-01-01 RX ADMIN — VANCOMYCIN HYDROCHLORIDE 750 MG: 750 INJECTION, POWDER, LYOPHILIZED, FOR SOLUTION INTRAVENOUS at 13:47

## 2022-01-01 RX ADMIN — HYOSCYAMINE SULFATE: 16 SOLUTION at 08:57

## 2022-01-01 RX ADMIN — HYDROMORPHONE HYDROCHLORIDE 8 MG: 2 TABLET ORAL at 18:19

## 2022-01-01 RX ADMIN — GLYCOPYRROLATE 0.1 MG: 0.2 INJECTION INTRAMUSCULAR; INTRAVENOUS at 08:44

## 2022-01-01 RX ADMIN — METOPROLOL TARTRATE 50 MG: 50 TABLET, FILM COATED ORAL at 22:28

## 2022-01-01 RX ADMIN — LATANOPROST 1 DROP: 50 SOLUTION OPHTHALMIC at 18:25

## 2022-01-01 RX ADMIN — ASPIRIN 81 MG: 81 TABLET, COATED ORAL at 08:48

## 2022-01-01 RX ADMIN — MINERAL SUPPLEMENT IRON 300 MG / 5 ML STRENGTH LIQUID 100 PER BOX UNFLAVORED 300 MG: at 21:12

## 2022-01-01 RX ADMIN — SACUBITRIL AND VALSARTAN 1 TABLET: 24; 26 TABLET, FILM COATED ORAL at 08:06

## 2022-01-01 RX ADMIN — PSYLLIUM HUSK 1 PACKET: 3.4 POWDER ORAL at 21:44

## 2022-01-01 RX ADMIN — AMLODIPINE BESYLATE 5 MG: 5 TABLET ORAL at 16:53

## 2022-01-01 RX ADMIN — INSULIN HUMAN 24 UNITS: 100 INJECTION, SUSPENSION SUBCUTANEOUS at 00:04

## 2022-01-01 RX ADMIN — FUROSEMIDE 20 MG: 10 INJECTION, SOLUTION INTRAMUSCULAR; INTRAVENOUS at 09:46

## 2022-01-01 RX ADMIN — BRIMONIDINE TARTRATE 1 DROP: 2 SOLUTION/ DROPS OPHTHALMIC at 15:22

## 2022-01-01 RX ADMIN — INSULIN HUMAN 24 UNITS: 100 INJECTION, SUSPENSION SUBCUTANEOUS at 17:12

## 2022-01-01 RX ADMIN — HYOSCYAMINE SULFATE: 16 SOLUTION at 08:19

## 2022-01-01 RX ADMIN — BRIMONIDINE TARTRATE 1 DROP: 2 SOLUTION OPHTHALMIC at 11:48

## 2022-01-01 RX ADMIN — HYDRALAZINE HYDROCHLORIDE 12.5 MG: 25 TABLET, FILM COATED ORAL at 18:03

## 2022-01-01 RX ADMIN — METOPROLOL TARTRATE 50 MG: 50 TABLET, FILM COATED ORAL at 08:57

## 2022-01-01 RX ADMIN — PIPERACILLIN SODIUM AND TAZOBACTAM SODIUM 3.38 G: 3; .375 INJECTION, POWDER, LYOPHILIZED, FOR SOLUTION INTRAVENOUS at 15:34

## 2022-01-01 RX ADMIN — FLUCYTOSINE 500 MG: 500 CAPSULE ORAL at 13:59

## 2022-01-01 RX ADMIN — FERROUS SULFATE TAB 325 MG (65 MG ELEMENTAL FE) 325 MG: 325 (65 FE) TAB at 21:33

## 2022-01-01 RX ADMIN — BRIMONIDINE TARTRATE 1 DROP: 2 SOLUTION OPHTHALMIC at 22:14

## 2022-01-01 RX ADMIN — HYDRALAZINE HYDROCHLORIDE 12.5 MG: 25 TABLET, FILM COATED ORAL at 15:19

## 2022-01-01 RX ADMIN — SODIUM CHLORIDE 50 ML/HR: 9 INJECTION, SOLUTION INTRAVENOUS at 05:35

## 2022-01-01 RX ADMIN — LATANOPROST 1 DROP: 50 SOLUTION OPHTHALMIC at 17:19

## 2022-01-01 RX ADMIN — INSULIN LISPRO 3 UNITS: 100 INJECTION, SOLUTION INTRAVENOUS; SUBCUTANEOUS at 12:33

## 2022-01-01 RX ADMIN — Medication: at 17:31

## 2022-01-01 RX ADMIN — LATANOPROST 1 DROP: 50 SOLUTION OPHTHALMIC at 19:18

## 2022-01-01 RX ADMIN — CALCIUM GLUCONATE 1 G: 98 INJECTION, SOLUTION INTRAVENOUS at 00:22

## 2022-01-01 RX ADMIN — METOPROLOL SUCCINATE 50 MG: 50 TABLET, EXTENDED RELEASE ORAL at 21:54

## 2022-01-01 RX ADMIN — SODIUM CHLORIDE, PRESERVATIVE FREE 10 ML: 5 INJECTION INTRAVENOUS at 13:35

## 2022-01-01 RX ADMIN — ACETAMINOPHEN ORAL SOLUTION 650 MG: 650 SOLUTION ORAL at 18:35

## 2022-01-01 RX ADMIN — MINERAL SUPPLEMENT IRON 300 MG / 5 ML STRENGTH LIQUID 100 PER BOX UNFLAVORED 300 MG: at 23:59

## 2022-01-01 RX ADMIN — AMLODIPINE BESYLATE 5 MG: 5 TABLET ORAL at 10:23

## 2022-01-01 RX ADMIN — HYDROMORPHONE HYDROCHLORIDE 1 MG: 1 INJECTION, SOLUTION INTRAMUSCULAR; INTRAVENOUS; SUBCUTANEOUS at 01:00

## 2022-01-01 RX ADMIN — MEROPENEM 1 G: 1 INJECTION, POWDER, FOR SOLUTION INTRAVENOUS at 17:40

## 2022-01-01 RX ADMIN — BRIMONIDINE TARTRATE 1 DROP: 2 SOLUTION/ DROPS OPHTHALMIC at 08:41

## 2022-01-01 RX ADMIN — PIPERACILLIN SODIUM AND TAZOBACTAM SODIUM 3.38 G: 3; .375 INJECTION, POWDER, LYOPHILIZED, FOR SOLUTION INTRAVENOUS at 17:30

## 2022-01-01 RX ADMIN — BRIMONIDINE TARTRATE 1 DROP: 2 SOLUTION/ DROPS OPHTHALMIC at 17:20

## 2022-01-01 RX ADMIN — INSULIN LISPRO 9 UNITS: 100 INJECTION, SOLUTION INTRAVENOUS; SUBCUTANEOUS at 06:22

## 2022-01-01 RX ADMIN — PIPERACILLIN SODIUM AND TAZOBACTAM SODIUM 3.38 G: 3; .375 INJECTION, POWDER, LYOPHILIZED, FOR SOLUTION INTRAVENOUS at 21:00

## 2022-01-01 RX ADMIN — METOPROLOL TARTRATE 50 MG: 50 TABLET, FILM COATED ORAL at 09:00

## 2022-01-01 RX ADMIN — PIPERACILLIN SODIUM AND TAZOBACTAM SODIUM 3.38 G: 3; .375 INJECTION, POWDER, LYOPHILIZED, FOR SOLUTION INTRAVENOUS at 15:43

## 2022-01-01 RX ADMIN — NITROGLYCERIN 1 INCH: 20 OINTMENT TOPICAL at 18:02

## 2022-01-01 RX ADMIN — ONDANSETRON 4 MG: 2 INJECTION INTRAMUSCULAR; INTRAVENOUS at 16:47

## 2022-01-01 RX ADMIN — PROPOFOL 25 MCG/KG/MIN: 10 INJECTION, EMULSION INTRAVENOUS at 09:18

## 2022-01-01 RX ADMIN — HEPARIN SODIUM 5000 UNITS: 5000 INJECTION INTRAVENOUS; SUBCUTANEOUS at 09:59

## 2022-01-01 RX ADMIN — BRIMONIDINE TARTRATE 1 DROP: 2 SOLUTION/ DROPS OPHTHALMIC at 22:27

## 2022-01-01 RX ADMIN — HYDRALAZINE HYDROCHLORIDE 50 MG: 50 TABLET, FILM COATED ORAL at 17:17

## 2022-01-01 RX ADMIN — GABAPENTIN 300 MG: 300 CAPSULE ORAL at 09:25

## 2022-01-01 RX ADMIN — INSULIN LISPRO 5 UNITS: 100 INJECTION, SOLUTION INTRAVENOUS; SUBCUTANEOUS at 02:34

## 2022-01-01 RX ADMIN — LORAZEPAM 1 MG: 2 INJECTION INTRAMUSCULAR at 20:24

## 2022-01-01 RX ADMIN — PROPOFOL 20 MCG/KG/MIN: 10 INJECTION, EMULSION INTRAVENOUS at 15:02

## 2022-01-01 RX ADMIN — GABAPENTIN 300 MG: 300 CAPSULE ORAL at 10:06

## 2022-01-01 RX ADMIN — FERROUS SULFATE TAB 325 MG (65 MG ELEMENTAL FE) 325 MG: 325 (65 FE) TAB at 08:10

## 2022-01-01 RX ADMIN — INSULIN LISPRO 1 UNITS: 100 INJECTION, SOLUTION INTRAVENOUS; SUBCUTANEOUS at 00:28

## 2022-01-01 RX ADMIN — BRIMONIDINE TARTRATE 1 DROP: 2 SOLUTION OPHTHALMIC at 16:13

## 2022-01-01 RX ADMIN — INSULIN GLARGINE 50 UNITS: 100 INJECTION, SOLUTION SUBCUTANEOUS at 08:17

## 2022-01-01 RX ADMIN — METOPROLOL TARTRATE 12.5 MG: 25 TABLET, FILM COATED ORAL at 08:31

## 2022-01-01 RX ADMIN — SODIUM CHLORIDE, PRESERVATIVE FREE 10 ML: 5 INJECTION INTRAVENOUS at 14:51

## 2022-01-01 RX ADMIN — HYDROMORPHONE HYDROCHLORIDE 1 MG: 1 INJECTION, SOLUTION INTRAMUSCULAR; INTRAVENOUS; SUBCUTANEOUS at 08:35

## 2022-01-01 RX ADMIN — INSULIN LISPRO 1 UNITS: 100 INJECTION, SOLUTION INTRAVENOUS; SUBCUTANEOUS at 01:48

## 2022-01-01 RX ADMIN — METOPROLOL TARTRATE 12.5 MG: 25 TABLET, FILM COATED ORAL at 20:43

## 2022-01-01 RX ADMIN — GABAPENTIN 300 MG: 300 CAPSULE ORAL at 09:20

## 2022-01-01 RX ADMIN — PHENYLEPHRINE HYDROCHLORIDE 200 MCG: 10 INJECTION INTRAVENOUS at 10:37

## 2022-01-01 RX ADMIN — INSULIN GLARGINE 15 UNITS: 100 INJECTION, SOLUTION SUBCUTANEOUS at 21:09

## 2022-01-01 RX ADMIN — Medication 1 SPRAY: at 16:00

## 2022-01-01 RX ADMIN — FUROSEMIDE 40 MG: 10 INJECTION, SOLUTION INTRAMUSCULAR; INTRAVENOUS at 14:16

## 2022-01-01 RX ADMIN — INSULIN LISPRO 7 UNITS: 100 INJECTION, SOLUTION INTRAVENOUS; SUBCUTANEOUS at 12:51

## 2022-01-01 RX ADMIN — BRIMONIDINE TARTRATE 1 DROP: 2 SOLUTION OPHTHALMIC at 08:36

## 2022-01-01 RX ADMIN — PROPOFOL 20 MCG/KG/MIN: 10 INJECTION, EMULSION INTRAVENOUS at 19:56

## 2022-01-01 RX ADMIN — Medication 1 SPRAY: at 20:42

## 2022-01-01 RX ADMIN — METOPROLOL TARTRATE 50 MG: 50 TABLET, FILM COATED ORAL at 09:43

## 2022-01-01 RX ADMIN — INSULIN GLARGINE 32 UNITS: 100 INJECTION, SOLUTION SUBCUTANEOUS at 21:44

## 2022-01-01 RX ADMIN — METOPROLOL SUCCINATE 50 MG: 50 TABLET, EXTENDED RELEASE ORAL at 11:45

## 2022-01-01 RX ADMIN — PIPERACILLIN SODIUM AND TAZOBACTAM SODIUM 3.38 G: 3; .375 INJECTION, POWDER, LYOPHILIZED, FOR SOLUTION INTRAVENOUS at 10:35

## 2022-01-01 RX ADMIN — INSULIN LISPRO 4 UNITS: 100 INJECTION, SOLUTION INTRAVENOUS; SUBCUTANEOUS at 17:43

## 2022-01-01 RX ADMIN — Medication 1 MG: at 01:11

## 2022-01-01 RX ADMIN — HYDRALAZINE HYDROCHLORIDE 50 MG: 50 TABLET, FILM COATED ORAL at 16:10

## 2022-01-01 RX ADMIN — HYDROMORPHONE HYDROCHLORIDE 2 MG: 2 INJECTION, SOLUTION INTRAMUSCULAR; INTRAVENOUS; SUBCUTANEOUS at 17:00

## 2022-01-01 RX ADMIN — HYDRALAZINE HYDROCHLORIDE 50 MG: 50 TABLET, FILM COATED ORAL at 09:30

## 2022-01-01 RX ADMIN — Medication 2 TABLET: at 08:51

## 2022-01-01 RX ADMIN — BRIMONIDINE TARTRATE 1 DROP: 2 SOLUTION OPHTHALMIC at 15:25

## 2022-01-01 RX ADMIN — LATANOPROST 1 DROP: 50 SOLUTION OPHTHALMIC at 17:44

## 2022-01-01 RX ADMIN — SODIUM CHLORIDE 3 G: 900 INJECTION INTRAVENOUS at 15:02

## 2022-01-01 RX ADMIN — PROPOFOL 30 MCG/KG/MIN: 10 INJECTION, EMULSION INTRAVENOUS at 16:37

## 2022-01-01 RX ADMIN — INSULIN GLARGINE 32 UNITS: 100 INJECTION, SOLUTION SUBCUTANEOUS at 20:11

## 2022-01-01 RX ADMIN — FERROUS SULFATE TAB 325 MG (65 MG ELEMENTAL FE) 325 MG: 325 (65 FE) TAB at 09:29

## 2022-01-01 RX ADMIN — ATORVASTATIN CALCIUM 20 MG: 20 TABLET, FILM COATED ORAL at 08:31

## 2022-01-01 RX ADMIN — PIPERACILLIN SODIUM AND TAZOBACTAM SODIUM 3.38 G: 3; .375 INJECTION, POWDER, LYOPHILIZED, FOR SOLUTION INTRAVENOUS at 01:26

## 2022-01-01 RX ADMIN — NITROGLYCERIN 1 INCH: 20 OINTMENT TOPICAL at 13:48

## 2022-01-01 RX ADMIN — LATANOPROST 1 DROP: 50 SOLUTION OPHTHALMIC at 18:51

## 2022-01-01 RX ADMIN — INSULIN LISPRO 3 UNITS: 100 INJECTION, SOLUTION INTRAVENOUS; SUBCUTANEOUS at 21:09

## 2022-01-01 RX ADMIN — PIPERACILLIN AND TAZOBACTAM 3.38 G: 3; .375 INJECTION, POWDER, LYOPHILIZED, FOR SOLUTION INTRAVENOUS at 13:04

## 2022-01-01 RX ADMIN — SODIUM CHLORIDE, PRESERVATIVE FREE 10 ML: 5 INJECTION INTRAVENOUS at 21:56

## 2022-01-01 RX ADMIN — FUROSEMIDE 40 MG: 10 INJECTION, SOLUTION INTRAMUSCULAR; INTRAVENOUS at 21:03

## 2022-01-01 RX ADMIN — LORAZEPAM 1 MG: 2 INJECTION INTRAMUSCULAR at 09:07

## 2022-01-01 RX ADMIN — Medication 2 TABLET: at 09:52

## 2022-01-01 RX ADMIN — PIPERACILLIN SODIUM AND TAZOBACTAM SODIUM 3.38 G: 3; .375 INJECTION, POWDER, LYOPHILIZED, FOR SOLUTION INTRAVENOUS at 10:52

## 2022-01-01 RX ADMIN — BRIMONIDINE TARTRATE 1 DROP: 2 SOLUTION OPHTHALMIC at 15:59

## 2022-01-01 RX ADMIN — SODIUM CHLORIDE, PRESERVATIVE FREE 10 ML: 5 INJECTION INTRAVENOUS at 05:51

## 2022-01-01 RX ADMIN — VANCOMYCIN HYDROCHLORIDE 2000 MG: 1 INJECTION, POWDER, LYOPHILIZED, FOR SOLUTION INTRAVENOUS at 00:51

## 2022-01-01 RX ADMIN — SODIUM CHLORIDE, PRESERVATIVE FREE 10 ML: 5 INJECTION INTRAVENOUS at 05:16

## 2022-01-01 RX ADMIN — SODIUM CHLORIDE, PRESERVATIVE FREE 10 ML: 5 INJECTION INTRAVENOUS at 06:27

## 2022-01-01 RX ADMIN — TRAMADOL HYDROCHLORIDE 50 MG: 50 TABLET, COATED ORAL at 05:06

## 2022-01-01 RX ADMIN — INSULIN LISPRO 9 UNITS: 100 INJECTION, SOLUTION INTRAVENOUS; SUBCUTANEOUS at 22:47

## 2022-01-01 RX ADMIN — LORAZEPAM 1 MG: 2 INJECTION INTRAMUSCULAR at 08:20

## 2022-01-01 RX ADMIN — GLYCOPYRROLATE 0.4 MG: 0.2 INJECTION INTRAMUSCULAR; INTRAVENOUS at 15:36

## 2022-01-01 RX ADMIN — HEPARIN SODIUM 5000 UNITS: 5000 INJECTION INTRAVENOUS; SUBCUTANEOUS at 15:48

## 2022-01-01 RX ADMIN — DEXTROSE MONOHYDRATE 30 ML/HR: 50 INJECTION, SOLUTION INTRAVENOUS at 18:04

## 2022-01-01 RX ADMIN — HYDROMORPHONE HYDROCHLORIDE 1 MG: 1 INJECTION, SOLUTION INTRAMUSCULAR; INTRAVENOUS; SUBCUTANEOUS at 17:39

## 2022-01-01 RX ADMIN — LORAZEPAM 2 MG: 2 INJECTION INTRAMUSCULAR at 03:26

## 2022-01-01 RX ADMIN — Medication 1 SPRAY: at 18:39

## 2022-01-01 RX ADMIN — METOPROLOL TARTRATE 12.5 MG: 25 TABLET, FILM COATED ORAL at 21:14

## 2022-01-01 RX ADMIN — MEROPENEM 1 G: 1 INJECTION, POWDER, FOR SOLUTION INTRAVENOUS at 00:18

## 2022-01-01 RX ADMIN — ACETAMINOPHEN ORAL SOLUTION 650 MG: 650 SOLUTION ORAL at 09:16

## 2022-01-01 RX ADMIN — BRIMONIDINE TARTRATE 1 DROP: 2 SOLUTION OPHTHALMIC at 02:43

## 2022-01-01 RX ADMIN — HYDRALAZINE HYDROCHLORIDE 50 MG: 50 TABLET, FILM COATED ORAL at 20:40

## 2022-01-01 RX ADMIN — POTASSIUM CHLORIDE 40 MEQ: 1.5 POWDER, FOR SOLUTION ORAL at 17:29

## 2022-01-01 RX ADMIN — BRIMONIDINE TARTRATE 1 DROP: 2 SOLUTION OPHTHALMIC at 10:13

## 2022-01-01 RX ADMIN — SODIUM CHLORIDE, PRESERVATIVE FREE 10 ML: 5 INJECTION INTRAVENOUS at 14:33

## 2022-01-01 RX ADMIN — PIPERACILLIN SODIUM AND TAZOBACTAM SODIUM 3.38 G: 3; .375 INJECTION, POWDER, LYOPHILIZED, FOR SOLUTION INTRAVENOUS at 15:19

## 2022-01-01 RX ADMIN — METOPROLOL TARTRATE 50 MG: 50 TABLET, FILM COATED ORAL at 10:01

## 2022-01-01 RX ADMIN — SODIUM CHLORIDE 3 G: 900 INJECTION INTRAVENOUS at 15:25

## 2022-01-01 RX ADMIN — Medication 1 MG: at 20:08

## 2022-01-01 RX ADMIN — DEXTROSE MONOHYDRATE 30 ML/HR: 50 INJECTION, SOLUTION INTRAVENOUS at 11:23

## 2022-01-01 RX ADMIN — HYDROMORPHONE HYDROCHLORIDE 2 MG: 2 INJECTION, SOLUTION INTRAMUSCULAR; INTRAVENOUS; SUBCUTANEOUS at 03:55

## 2022-01-01 RX ADMIN — INSULIN LISPRO 9 UNITS: 100 INJECTION, SOLUTION INTRAVENOUS; SUBCUTANEOUS at 08:49

## 2022-01-01 RX ADMIN — CEFTAZIDIME, AVIBACTAM 2.5 G: 2; .5 POWDER, FOR SOLUTION INTRAVENOUS at 21:04

## 2022-01-01 RX ADMIN — HEPARIN SODIUM 5000 UNITS: 5000 INJECTION INTRAVENOUS; SUBCUTANEOUS at 00:50

## 2022-01-01 RX ADMIN — BARIUM SULFATE 30 ML: 400 PASTE ORAL at 11:50

## 2022-01-01 RX ADMIN — ASPIRIN 81 MG: 81 TABLET, COATED ORAL at 10:06

## 2022-01-01 RX ADMIN — TRAMADOL HYDROCHLORIDE 25 MG: 50 TABLET, COATED ORAL at 10:05

## 2022-01-01 RX ADMIN — SODIUM CHLORIDE 100 ML/HR: 4.5 INJECTION, SOLUTION INTRAVENOUS at 22:04

## 2022-01-01 RX ADMIN — Medication 1 SPRAY: at 17:09

## 2022-01-01 RX ADMIN — INSULIN LISPRO 9 UNITS: 100 INJECTION, SOLUTION INTRAVENOUS; SUBCUTANEOUS at 18:44

## 2022-01-01 RX ADMIN — INSULIN LISPRO 4 UNITS: 100 INJECTION, SOLUTION INTRAVENOUS; SUBCUTANEOUS at 09:29

## 2022-01-01 RX ADMIN — FERROUS SULFATE TAB 325 MG (65 MG ELEMENTAL FE) 325 MG: 325 (65 FE) TAB at 22:17

## 2022-01-01 RX ADMIN — FAMOTIDINE 20 MG: 20 TABLET ORAL at 21:51

## 2022-01-01 RX ADMIN — BRIMONIDINE TARTRATE 1 DROP: 2 SOLUTION OPHTHALMIC at 09:01

## 2022-01-01 RX ADMIN — BRIMONIDINE TARTRATE 1 DROP: 2 SOLUTION/ DROPS OPHTHALMIC at 20:29

## 2022-01-01 RX ADMIN — HYOSCYAMINE SULFATE: 16 SOLUTION at 20:49

## 2022-01-01 RX ADMIN — HYDROMORPHONE HYDROCHLORIDE 1 MG: 1 INJECTION, SOLUTION INTRAMUSCULAR; INTRAVENOUS; SUBCUTANEOUS at 09:08

## 2022-01-01 RX ADMIN — INSULIN LISPRO 5 UNITS: 100 INJECTION, SOLUTION INTRAVENOUS; SUBCUTANEOUS at 07:03

## 2022-01-01 RX ADMIN — FAMOTIDINE 20 MG: 20 TABLET ORAL at 21:33

## 2022-01-01 RX ADMIN — ASPIRIN 81 MG CHEWABLE TABLET 81 MG: 81 TABLET CHEWABLE at 08:48

## 2022-01-01 RX ADMIN — INSULIN GLARGINE 22 UNITS: 100 INJECTION, SOLUTION SUBCUTANEOUS at 10:54

## 2022-01-01 RX ADMIN — GABAPENTIN 300 MG: 300 CAPSULE ORAL at 21:56

## 2022-01-01 RX ADMIN — LINEZOLID 600 MG: 600 TABLET, FILM COATED ORAL at 12:26

## 2022-01-01 RX ADMIN — PROPOFOL 20 MG: 10 INJECTION, EMULSION INTRAVENOUS at 14:38

## 2022-01-01 RX ADMIN — Medication 1 MG: at 01:21

## 2022-01-01 RX ADMIN — HEPARIN SODIUM 5000 UNITS: 5000 INJECTION INTRAVENOUS; SUBCUTANEOUS at 23:34

## 2022-01-01 RX ADMIN — LORAZEPAM 1 MG: 2 INJECTION INTRAMUSCULAR at 05:35

## 2022-01-01 RX ADMIN — GLYCOPYRROLATE 0.4 MG: 0.2 INJECTION INTRAMUSCULAR; INTRAVENOUS at 08:35

## 2022-01-01 RX ADMIN — SODIUM ZIRCONIUM CYCLOSILICATE 10 G: 10 POWDER, FOR SUSPENSION ORAL at 10:22

## 2022-01-01 RX ADMIN — PROPOFOL 25 MCG/KG/MIN: 10 INJECTION, EMULSION INTRAVENOUS at 14:49

## 2022-01-01 RX ADMIN — BRIMONIDINE TARTRATE 1 DROP: 2 SOLUTION OPHTHALMIC at 08:21

## 2022-01-01 RX ADMIN — VANCOMYCIN HYDROCHLORIDE 500 MG: 500 INJECTION, POWDER, LYOPHILIZED, FOR SOLUTION INTRAVENOUS at 16:07

## 2022-01-01 RX ADMIN — Medication 2 TABLET: at 08:02

## 2022-01-01 RX ADMIN — GLYCOPYRROLATE 0.4 MG: 0.2 INJECTION INTRAMUSCULAR; INTRAVENOUS at 12:16

## 2022-01-01 RX ADMIN — GLYCOPYRROLATE 0.4 MG: 0.2 INJECTION INTRAMUSCULAR; INTRAVENOUS at 05:29

## 2022-01-01 RX ADMIN — CHOLESTYRAMINE 4 G: 4 POWDER, FOR SUSPENSION ORAL at 09:47

## 2022-01-01 RX ADMIN — INSULIN LISPRO 9 UNITS: 100 INJECTION, SOLUTION INTRAVENOUS; SUBCUTANEOUS at 20:40

## 2022-01-01 RX ADMIN — INSULIN LISPRO 2 UNITS: 100 INJECTION, SOLUTION INTRAVENOUS; SUBCUTANEOUS at 00:11

## 2022-01-01 RX ADMIN — PROPOFOL 25 MCG/KG/MIN: 10 INJECTION, EMULSION INTRAVENOUS at 05:18

## 2022-01-01 RX ADMIN — LATANOPROST 1 DROP: 50 SOLUTION OPHTHALMIC at 20:07

## 2022-01-01 RX ADMIN — BRIMONIDINE TARTRATE 1 DROP: 2 SOLUTION OPHTHALMIC at 08:01

## 2022-01-01 RX ADMIN — VANCOMYCIN HYDROCHLORIDE 1000 MG: 1 INJECTION, POWDER, LYOPHILIZED, FOR SOLUTION INTRAVENOUS at 15:19

## 2022-01-01 RX ADMIN — BRIMONIDINE TARTRATE 1 DROP: 2 SOLUTION OPHTHALMIC at 15:19

## 2022-01-01 RX ADMIN — METOPROLOL SUCCINATE 50 MG: 50 TABLET, EXTENDED RELEASE ORAL at 21:44

## 2022-01-01 RX ADMIN — INSULIN LISPRO 2 UNITS: 100 INJECTION, SOLUTION INTRAVENOUS; SUBCUTANEOUS at 12:42

## 2022-01-01 RX ADMIN — CEFTOLOZANE AND TAZOBACTAM 3 G: 1; .5 INJECTION, POWDER, LYOPHILIZED, FOR SOLUTION INTRAVENOUS at 14:55

## 2022-01-01 RX ADMIN — HYDRALAZINE HYDROCHLORIDE 12.5 MG: 25 TABLET, FILM COATED ORAL at 16:07

## 2022-01-01 RX ADMIN — MINERAL SUPPLEMENT IRON 300 MG / 5 ML STRENGTH LIQUID 100 PER BOX UNFLAVORED 300 MG: at 22:06

## 2022-01-01 RX ADMIN — ASPIRIN 81 MG: 81 TABLET, COATED ORAL at 08:02

## 2022-01-01 RX ADMIN — ASPIRIN 81 MG: 81 TABLET, COATED ORAL at 08:56

## 2022-01-01 RX ADMIN — HYDRALAZINE HYDROCHLORIDE 50 MG: 50 TABLET, FILM COATED ORAL at 10:12

## 2022-01-01 RX ADMIN — LORAZEPAM 1 MG: 2 INJECTION INTRAMUSCULAR at 00:24

## 2022-01-01 RX ADMIN — INSULIN LISPRO 6 UNITS: 100 INJECTION, SOLUTION INTRAVENOUS; SUBCUTANEOUS at 09:19

## 2022-01-01 RX ADMIN — INSULIN GLARGINE 22 UNITS: 100 INJECTION, SOLUTION SUBCUTANEOUS at 23:39

## 2022-01-01 RX ADMIN — INSULIN LISPRO 3 UNITS: 100 INJECTION, SOLUTION INTRAVENOUS; SUBCUTANEOUS at 17:32

## 2022-01-01 RX ADMIN — PIPERACILLIN SODIUM AND TAZOBACTAM SODIUM 3.38 G: 3; .375 INJECTION, POWDER, LYOPHILIZED, FOR SOLUTION INTRAVENOUS at 09:04

## 2022-01-01 RX ADMIN — FERROUS SULFATE TAB 325 MG (65 MG ELEMENTAL FE) 325 MG: 325 (65 FE) TAB at 21:43

## 2022-01-01 RX ADMIN — Medication 1 SPRAY: at 16:13

## 2022-01-01 RX ADMIN — Medication 1 SPRAY: at 15:42

## 2022-01-01 RX ADMIN — BRIMONIDINE TARTRATE 1 DROP: 2 SOLUTION OPHTHALMIC at 16:00

## 2022-01-01 RX ADMIN — FUROSEMIDE 20 MG: 10 INJECTION, SOLUTION INTRAMUSCULAR; INTRAVENOUS at 08:59

## 2022-01-01 RX ADMIN — MINERAL SUPPLEMENT IRON 300 MG / 5 ML STRENGTH LIQUID 100 PER BOX UNFLAVORED 300 MG: at 22:05

## 2022-01-01 RX ADMIN — LORAZEPAM 1 MG: 2 INJECTION INTRAMUSCULAR at 08:32

## 2022-01-01 RX ADMIN — GLYCOPYRROLATE 0.1 MG: 0.2 INJECTION INTRAMUSCULAR; INTRAVENOUS at 09:52

## 2022-01-01 RX ADMIN — IPRATROPIUM BROMIDE AND ALBUTEROL SULFATE 3 ML: .5; 3 SOLUTION RESPIRATORY (INHALATION) at 20:33

## 2022-01-01 RX ADMIN — INSULIN LISPRO 2 UNITS: 100 INJECTION, SOLUTION INTRAVENOUS; SUBCUTANEOUS at 23:05

## 2022-01-01 RX ADMIN — SACUBITRIL AND VALSARTAN 1 TABLET: 24; 26 TABLET, FILM COATED ORAL at 09:52

## 2022-01-01 RX ADMIN — HYDRALAZINE HYDROCHLORIDE 20 MG: 20 INJECTION, SOLUTION INTRAMUSCULAR; INTRAVENOUS at 05:54

## 2022-01-01 RX ADMIN — FAMOTIDINE 20 MG: 20 TABLET ORAL at 09:47

## 2022-01-01 RX ADMIN — METOPROLOL TARTRATE 50 MG: 50 TABLET, FILM COATED ORAL at 10:42

## 2022-01-01 RX ADMIN — BRIMONIDINE TARTRATE 1 DROP: 2 SOLUTION/ DROPS OPHTHALMIC at 11:27

## 2022-01-01 RX ADMIN — LATANOPROST 1 DROP: 50 SOLUTION OPHTHALMIC at 17:41

## 2022-01-01 RX ADMIN — Medication 1 SPRAY: at 09:17

## 2022-01-01 RX ADMIN — SODIUM CHLORIDE, PRESERVATIVE FREE 10 ML: 5 INJECTION INTRAVENOUS at 14:40

## 2022-01-01 RX ADMIN — FAMOTIDINE 20 MG: 20 TABLET ORAL at 11:16

## 2022-01-01 RX ADMIN — FUROSEMIDE 20 MG: 10 INJECTION, SOLUTION INTRAMUSCULAR; INTRAVENOUS at 22:09

## 2022-01-01 RX ADMIN — HYDRALAZINE HYDROCHLORIDE 12.5 MG: 25 TABLET, FILM COATED ORAL at 21:23

## 2022-01-01 RX ADMIN — GLYCOPYRROLATE 0.4 MG: 0.2 INJECTION INTRAMUSCULAR; INTRAVENOUS at 13:17

## 2022-01-01 RX ADMIN — BRIMONIDINE TARTRATE 1 DROP: 2 SOLUTION/ DROPS OPHTHALMIC at 10:37

## 2022-01-01 RX ADMIN — HYDROMORPHONE HYDROCHLORIDE 1 MG: 1 INJECTION, SOLUTION INTRAMUSCULAR; INTRAVENOUS; SUBCUTANEOUS at 11:11

## 2022-01-01 RX ADMIN — NITROGLYCERIN 1 INCH: 20 OINTMENT TOPICAL at 06:07

## 2022-01-01 RX ADMIN — NITROGLYCERIN 1 INCH: 20 OINTMENT TOPICAL at 01:40

## 2022-01-01 RX ADMIN — LATANOPROST 1 DROP: 50 SOLUTION OPHTHALMIC at 17:02

## 2022-01-01 RX ADMIN — INSULIN LISPRO 4 UNITS: 100 INJECTION, SOLUTION INTRAVENOUS; SUBCUTANEOUS at 18:07

## 2022-01-01 RX ADMIN — AMLODIPINE BESYLATE 5 MG: 5 TABLET ORAL at 08:16

## 2022-01-01 RX ADMIN — INSULIN LISPRO 6 UNITS: 100 INJECTION, SOLUTION INTRAVENOUS; SUBCUTANEOUS at 18:03

## 2022-01-01 RX ADMIN — METOPROLOL TARTRATE 50 MG: 50 TABLET, FILM COATED ORAL at 09:45

## 2022-01-01 RX ADMIN — ATORVASTATIN CALCIUM 20 MG: 20 TABLET, FILM COATED ORAL at 22:05

## 2022-01-01 RX ADMIN — INSULIN GLARGINE 50 UNITS: 100 INJECTION, SOLUTION SUBCUTANEOUS at 10:19

## 2022-01-01 RX ADMIN — INSULIN LISPRO 1 UNITS: 100 INJECTION, SOLUTION INTRAVENOUS; SUBCUTANEOUS at 12:14

## 2022-01-01 RX ADMIN — SODIUM CHLORIDE, PRESERVATIVE FREE 10 ML: 5 INJECTION INTRAVENOUS at 22:11

## 2022-01-01 RX ADMIN — GLYCOPYRROLATE 0.4 MG: 0.2 INJECTION INTRAMUSCULAR; INTRAVENOUS at 20:47

## 2022-01-01 RX ADMIN — INSULIN GLARGINE 50 UNITS: 100 INJECTION, SOLUTION SUBCUTANEOUS at 10:23

## 2022-01-01 RX ADMIN — GABAPENTIN 300 MG: 300 CAPSULE ORAL at 21:25

## 2022-01-01 RX ADMIN — MINERAL SUPPLEMENT IRON 300 MG / 5 ML STRENGTH LIQUID 100 PER BOX UNFLAVORED 300 MG: at 10:11

## 2022-01-01 RX ADMIN — MINERAL SUPPLEMENT IRON 300 MG / 5 ML STRENGTH LIQUID 100 PER BOX UNFLAVORED 300 MG: at 08:31

## 2022-01-01 RX ADMIN — BRIMONIDINE TARTRATE 1 DROP: 2 SOLUTION OPHTHALMIC at 20:12

## 2022-01-01 RX ADMIN — HYDROMORPHONE HYDROCHLORIDE 1 MG: 1 INJECTION, SOLUTION INTRAMUSCULAR; INTRAVENOUS; SUBCUTANEOUS at 04:46

## 2022-01-01 RX ADMIN — SODIUM CHLORIDE, PRESERVATIVE FREE 10 ML: 5 INJECTION INTRAVENOUS at 06:01

## 2022-01-01 RX ADMIN — POTASSIUM CHLORIDE 40 MEQ: 1.5 POWDER, FOR SOLUTION ORAL at 11:35

## 2022-01-01 RX ADMIN — INSULIN GLARGINE 10 UNITS: 100 INJECTION, SOLUTION SUBCUTANEOUS at 22:53

## 2022-01-01 RX ADMIN — PROPOFOL 20 MCG/KG/MIN: 10 INJECTION, EMULSION INTRAVENOUS at 23:29

## 2022-01-01 RX ADMIN — GLYCOPYRROLATE 0.4 MG: 0.2 INJECTION INTRAMUSCULAR; INTRAVENOUS at 00:21

## 2022-01-01 RX ADMIN — FERROUS SULFATE TAB 325 MG (65 MG ELEMENTAL FE) 325 MG: 325 (65 FE) TAB at 20:44

## 2022-01-01 RX ADMIN — INSULIN LISPRO 1 UNITS: 100 INJECTION, SOLUTION INTRAVENOUS; SUBCUTANEOUS at 18:54

## 2022-01-01 RX ADMIN — HYDROCODONE BITARTRATE AND ACETAMINOPHEN 1 TABLET: 5; 325 TABLET ORAL at 23:03

## 2022-01-01 RX ADMIN — LORAZEPAM 1 MG: 2 INJECTION INTRAMUSCULAR at 12:16

## 2022-01-01 RX ADMIN — Medication 2 TABLET: at 08:55

## 2022-01-01 RX ADMIN — ACETAMINOPHEN 650 MG: 650 SOLUTION ORAL at 09:06

## 2022-01-01 RX ADMIN — PROPOFOL 25 MCG/KG/MIN: 10 INJECTION, EMULSION INTRAVENOUS at 16:11

## 2022-01-01 RX ADMIN — SODIUM CHLORIDE, PRESERVATIVE FREE 10 ML: 5 INJECTION INTRAVENOUS at 13:04

## 2022-01-01 RX ADMIN — INSULIN LISPRO 4 UNITS: 100 INJECTION, SOLUTION INTRAVENOUS; SUBCUTANEOUS at 18:15

## 2022-01-01 RX ADMIN — FAMOTIDINE 20 MG: 20 TABLET ORAL at 10:06

## 2022-01-01 RX ADMIN — BRIMONIDINE TARTRATE 1 DROP: 2 SOLUTION OPHTHALMIC at 17:10

## 2022-01-01 RX ADMIN — GABAPENTIN 300 MG: 300 CAPSULE ORAL at 09:30

## 2022-01-01 RX ADMIN — SODIUM CHLORIDE, POTASSIUM CHLORIDE, SODIUM LACTATE AND CALCIUM CHLORIDE: 600; 310; 30; 20 INJECTION, SOLUTION INTRAVENOUS at 10:32

## 2022-01-01 RX ADMIN — INSULIN HUMAN 32 UNITS: 100 INJECTION, SUSPENSION SUBCUTANEOUS at 14:30

## 2022-01-01 RX ADMIN — NITROGLYCERIN 1 INCH: 20 OINTMENT TOPICAL at 05:37

## 2022-01-01 RX ADMIN — ACETAMINOPHEN 650 MG: 325 TABLET ORAL at 22:46

## 2022-01-01 RX ADMIN — PROPOFOL 20 MCG/KG/MIN: 10 INJECTION, EMULSION INTRAVENOUS at 05:40

## 2022-01-01 RX ADMIN — NITROGLYCERIN 1 INCH: 20 OINTMENT TOPICAL at 18:08

## 2022-01-01 RX ADMIN — SODIUM CHLORIDE, PRESERVATIVE FREE 10 ML: 5 INJECTION INTRAVENOUS at 21:52

## 2022-01-01 RX ADMIN — HYOSCYAMINE SULFATE: 16 SOLUTION at 08:21

## 2022-01-01 RX ADMIN — INSULIN LISPRO 7 UNITS: 100 INJECTION, SOLUTION INTRAVENOUS; SUBCUTANEOUS at 18:42

## 2022-01-01 RX ADMIN — Medication 2 TABLET: at 10:33

## 2022-01-01 RX ADMIN — METOPROLOL TARTRATE 50 MG: 50 TABLET, FILM COATED ORAL at 21:50

## 2022-01-01 RX ADMIN — INSULIN LISPRO 3 UNITS: 100 INJECTION, SOLUTION INTRAVENOUS; SUBCUTANEOUS at 11:43

## 2022-01-01 RX ADMIN — HYDRALAZINE HYDROCHLORIDE 50 MG: 50 TABLET, FILM COATED ORAL at 21:58

## 2022-01-01 RX ADMIN — INSULIN LISPRO 3 UNITS: 100 INJECTION, SOLUTION INTRAVENOUS; SUBCUTANEOUS at 06:08

## 2022-01-01 RX ADMIN — INSULIN GLARGINE 10 UNITS: 100 INJECTION, SOLUTION SUBCUTANEOUS at 21:14

## 2022-01-01 RX ADMIN — ACETAMINOPHEN 650 MG: 650 SOLUTION ORAL at 16:53

## 2022-01-01 RX ADMIN — SODIUM CHLORIDE, PRESERVATIVE FREE 10 ML: 5 INJECTION INTRAVENOUS at 06:24

## 2022-01-01 RX ADMIN — GABAPENTIN 300 MG: 300 CAPSULE ORAL at 16:00

## 2022-01-01 RX ADMIN — INSULIN LISPRO 18 UNITS: 100 INJECTION, SOLUTION INTRAVENOUS; SUBCUTANEOUS at 12:32

## 2022-01-01 RX ADMIN — BRIMONIDINE TARTRATE 1 DROP: 2 SOLUTION OPHTHALMIC at 21:42

## 2022-01-01 RX ADMIN — CEFTAZIDIME, AVIBACTAM 2.5 G: 2; .5 POWDER, FOR SOLUTION INTRAVENOUS at 22:08

## 2022-01-01 RX ADMIN — FERROUS SULFATE TAB 325 MG (65 MG ELEMENTAL FE) 325 MG: 325 (65 FE) TAB at 22:06

## 2022-01-01 RX ADMIN — FLUCONAZOLE 200 MG: 100 TABLET ORAL at 09:32

## 2022-01-01 RX ADMIN — TRAMADOL HYDROCHLORIDE 25 MG: 50 TABLET, COATED ORAL at 00:56

## 2022-01-01 RX ADMIN — SODIUM CHLORIDE 100 ML/HR: 4.5 INJECTION, SOLUTION INTRAVENOUS at 06:00

## 2022-01-01 RX ADMIN — BRIMONIDINE TARTRATE 1 DROP: 2 SOLUTION OPHTHALMIC at 17:01

## 2022-01-01 RX ADMIN — HYOSCYAMINE SULFATE: 16 SOLUTION at 22:02

## 2022-01-01 RX ADMIN — SODIUM CHLORIDE 3 G: 900 INJECTION INTRAVENOUS at 15:10

## 2022-01-01 RX ADMIN — SACUBITRIL AND VALSARTAN 1 TABLET: 24; 26 TABLET, FILM COATED ORAL at 08:01

## 2022-01-01 RX ADMIN — INSULIN LISPRO 1 UNITS: 100 INJECTION, SOLUTION INTRAVENOUS; SUBCUTANEOUS at 12:53

## 2022-01-01 RX ADMIN — GABAPENTIN 300 MG: 300 CAPSULE ORAL at 09:06

## 2022-01-01 RX ADMIN — AMLODIPINE BESYLATE 5 MG: 5 TABLET ORAL at 17:20

## 2022-01-01 RX ADMIN — INSULIN GLARGINE 50 UNITS: 100 INJECTION, SOLUTION SUBCUTANEOUS at 08:35

## 2022-01-01 RX ADMIN — FERROUS SULFATE TAB 325 MG (65 MG ELEMENTAL FE) 325 MG: 325 (65 FE) TAB at 08:06

## 2022-01-01 RX ADMIN — INSULIN GLARGINE 50 UNITS: 100 INJECTION, SOLUTION SUBCUTANEOUS at 08:49

## 2022-01-01 RX ADMIN — NITROGLYCERIN 1 INCH: 20 OINTMENT TOPICAL at 00:09

## 2022-01-01 RX ADMIN — FERROUS SULFATE TAB 325 MG (65 MG ELEMENTAL FE) 325 MG: 325 (65 FE) TAB at 22:59

## 2022-01-01 RX ADMIN — HEPARIN SODIUM 5000 UNITS: 5000 INJECTION INTRAVENOUS; SUBCUTANEOUS at 17:06

## 2022-01-01 RX ADMIN — VANCOMYCIN HYDROCHLORIDE 1250 MG: 1.25 INJECTION, POWDER, LYOPHILIZED, FOR SOLUTION INTRAVENOUS at 02:07

## 2022-01-01 RX ADMIN — Medication 1 SPRAY: at 21:02

## 2022-01-01 RX ADMIN — ASPIRIN 81 MG: 81 TABLET, COATED ORAL at 10:12

## 2022-01-01 RX ADMIN — NITROGLYCERIN 1 INCH: 20 OINTMENT TOPICAL at 18:16

## 2022-01-01 RX ADMIN — INSULIN LISPRO 2 UNITS: 100 INJECTION, SOLUTION INTRAVENOUS; SUBCUTANEOUS at 17:51

## 2022-01-01 RX ADMIN — ASPIRIN 81 MG: 81 TABLET, COATED ORAL at 09:45

## 2022-01-01 RX ADMIN — HEPARIN SODIUM 5000 UNITS: 5000 INJECTION INTRAVENOUS; SUBCUTANEOUS at 00:25

## 2022-01-01 RX ADMIN — INSULIN GLARGINE 32 UNITS: 100 INJECTION, SOLUTION SUBCUTANEOUS at 10:25

## 2022-01-01 RX ADMIN — VANCOMYCIN HYDROCHLORIDE 500 MG: 500 INJECTION, POWDER, LYOPHILIZED, FOR SOLUTION INTRAVENOUS at 15:09

## 2022-01-01 RX ADMIN — INSULIN LISPRO 3 UNITS: 100 INJECTION, SOLUTION INTRAVENOUS; SUBCUTANEOUS at 12:42

## 2022-01-01 RX ADMIN — METOPROLOL TARTRATE 12.5 MG: 25 TABLET, FILM COATED ORAL at 10:30

## 2022-01-01 RX ADMIN — FENTANYL CITRATE 100 MCG: 50 INJECTION, SOLUTION INTRAMUSCULAR; INTRAVENOUS at 16:44

## 2022-01-01 RX ADMIN — LATANOPROST 1 DROP: 50 SOLUTION OPHTHALMIC at 18:48

## 2022-01-01 RX ADMIN — GABAPENTIN 300 MG: 300 CAPSULE ORAL at 09:39

## 2022-01-01 RX ADMIN — CHOLESTYRAMINE 4 G: 4 POWDER, FOR SUSPENSION ORAL at 15:54

## 2022-01-01 RX ADMIN — CALCIUM POLYCARBOPHIL 625 MG: 625 TABLET, FILM COATED ORAL at 08:01

## 2022-01-01 RX ADMIN — LATANOPROST 1 DROP: 50 SOLUTION OPHTHALMIC at 17:01

## 2022-01-01 RX ADMIN — PIPERACILLIN SODIUM AND TAZOBACTAM SODIUM 3.38 G: 3; .375 INJECTION, POWDER, LYOPHILIZED, FOR SOLUTION INTRAVENOUS at 02:51

## 2022-01-01 RX ADMIN — CALCIUM POLYCARBOPHIL 625 MG: 625 TABLET, FILM COATED ORAL at 09:53

## 2022-01-01 RX ADMIN — LINEZOLID 600 MG: 600 TABLET, FILM COATED ORAL at 20:47

## 2022-01-01 RX ADMIN — PROPOFOL 25 MCG/KG/MIN: 10 INJECTION, EMULSION INTRAVENOUS at 17:20

## 2022-01-01 RX ADMIN — HYDROMORPHONE HYDROCHLORIDE 1 MG: 1 INJECTION, SOLUTION INTRAMUSCULAR; INTRAVENOUS; SUBCUTANEOUS at 21:33

## 2022-01-01 RX ADMIN — INSULIN LISPRO 3 UNITS: 100 INJECTION, SOLUTION INTRAVENOUS; SUBCUTANEOUS at 12:27

## 2022-01-01 RX ADMIN — NITROGLYCERIN 1 INCH: 20 OINTMENT TOPICAL at 21:58

## 2022-01-01 RX ADMIN — SODIUM CHLORIDE 100 ML/HR: 9 INJECTION, SOLUTION INTRAVENOUS at 04:00

## 2022-01-01 RX ADMIN — GABAPENTIN 300 MG: 300 CAPSULE ORAL at 08:31

## 2022-01-01 RX ADMIN — FUROSEMIDE 40 MG: 10 INJECTION, SOLUTION INTRAMUSCULAR; INTRAVENOUS at 21:14

## 2022-01-01 RX ADMIN — INSULIN LISPRO 3 UNITS: 100 INJECTION, SOLUTION INTRAVENOUS; SUBCUTANEOUS at 17:16

## 2022-01-01 RX ADMIN — SODIUM CHLORIDE 3 G: 900 INJECTION INTRAVENOUS at 15:18

## 2022-01-01 RX ADMIN — ALTEPLASE 2 MG: 2.2 INJECTION, POWDER, LYOPHILIZED, FOR SOLUTION INTRAVENOUS at 17:08

## 2022-01-01 RX ADMIN — INSULIN LISPRO 6 UNITS: 100 INJECTION, SOLUTION INTRAVENOUS; SUBCUTANEOUS at 02:52

## 2022-01-01 RX ADMIN — GLYCOPYRROLATE 0.4 MG: 0.2 INJECTION INTRAMUSCULAR; INTRAVENOUS at 16:59

## 2022-01-01 RX ADMIN — HEPARIN SODIUM 5000 UNITS: 5000 INJECTION INTRAVENOUS; SUBCUTANEOUS at 00:12

## 2022-01-01 RX ADMIN — ATORVASTATIN CALCIUM 20 MG: 20 TABLET, FILM COATED ORAL at 20:42

## 2022-01-01 RX ADMIN — INSULIN LISPRO 9 UNITS: 100 INJECTION, SOLUTION INTRAVENOUS; SUBCUTANEOUS at 04:00

## 2022-01-01 RX ADMIN — CHOLESTYRAMINE 4 G: 4 POWDER, FOR SUSPENSION ORAL at 08:40

## 2022-01-01 RX ADMIN — ENOXAPARIN SODIUM 40 MG: 100 INJECTION SUBCUTANEOUS at 09:15

## 2022-01-01 RX ADMIN — METOPROLOL SUCCINATE 50 MG: 50 TABLET, EXTENDED RELEASE ORAL at 11:07

## 2022-01-01 RX ADMIN — BRIMONIDINE TARTRATE 1 DROP: 2 SOLUTION OPHTHALMIC at 21:05

## 2022-01-01 RX ADMIN — HYDRALAZINE HYDROCHLORIDE 12.5 MG: 25 TABLET, FILM COATED ORAL at 09:19

## 2022-01-01 RX ADMIN — INSULIN LISPRO 3 UNITS: 100 INJECTION, SOLUTION INTRAVENOUS; SUBCUTANEOUS at 06:26

## 2022-01-01 RX ADMIN — INSULIN GLARGINE 50 UNITS: 100 INJECTION, SOLUTION SUBCUTANEOUS at 09:47

## 2022-01-01 RX ADMIN — SODIUM CHLORIDE 3 G: 900 INJECTION INTRAVENOUS at 16:19

## 2022-01-01 RX ADMIN — HYDROMORPHONE HYDROCHLORIDE 2 MG: 2 INJECTION, SOLUTION INTRAMUSCULAR; INTRAVENOUS; SUBCUTANEOUS at 03:12

## 2022-01-01 RX ADMIN — INSULIN LISPRO 3 UNITS: 100 INJECTION, SOLUTION INTRAVENOUS; SUBCUTANEOUS at 05:21

## 2022-01-01 RX ADMIN — SODIUM CHLORIDE 100 MG: 9 INJECTION, SOLUTION INTRAVENOUS at 17:42

## 2022-01-01 RX ADMIN — CALCIUM POLYCARBOPHIL 625 MG: 625 TABLET, FILM COATED ORAL at 08:09

## 2022-01-01 RX ADMIN — FERROUS SULFATE TAB 325 MG (65 MG ELEMENTAL FE) 325 MG: 325 (65 FE) TAB at 20:42

## 2022-01-01 RX ADMIN — GABAPENTIN 300 MG: 300 CAPSULE ORAL at 09:59

## 2022-01-01 RX ADMIN — HYDRALAZINE HYDROCHLORIDE 50 MG: 50 TABLET, FILM COATED ORAL at 20:11

## 2022-01-01 RX ADMIN — BRIMONIDINE TARTRATE 1 DROP: 2 SOLUTION OPHTHALMIC at 21:45

## 2022-01-01 RX ADMIN — LORAZEPAM 1 MG: 2 INJECTION INTRAMUSCULAR at 08:39

## 2022-01-01 RX ADMIN — Medication 1 MCG/MIN: at 21:26

## 2022-01-01 RX ADMIN — PROPOFOL 20 MCG/KG/MIN: 10 INJECTION, EMULSION INTRAVENOUS at 12:25

## 2022-01-01 RX ADMIN — HYDROMORPHONE HYDROCHLORIDE 8 MG: 2 TABLET ORAL at 09:39

## 2022-01-01 RX ADMIN — GABAPENTIN 300 MG: 300 CAPSULE ORAL at 08:17

## 2022-01-01 RX ADMIN — PHYTONADIONE 10 MG: 10 INJECTION, EMULSION INTRAMUSCULAR; INTRAVENOUS; SUBCUTANEOUS at 20:35

## 2022-01-01 RX ADMIN — INSULIN LISPRO 6 UNITS: 100 INJECTION, SOLUTION INTRAVENOUS; SUBCUTANEOUS at 07:51

## 2022-01-01 RX ADMIN — HYDRALAZINE HYDROCHLORIDE 20 MG: 20 INJECTION, SOLUTION INTRAMUSCULAR; INTRAVENOUS at 05:59

## 2022-01-01 RX ADMIN — INSULIN LISPRO 10 UNITS: 100 INJECTION, SOLUTION INTRAVENOUS; SUBCUTANEOUS at 05:42

## 2022-01-01 RX ADMIN — MINERAL SUPPLEMENT IRON 300 MG / 5 ML STRENGTH LIQUID 100 PER BOX UNFLAVORED 300 MG: at 20:39

## 2022-01-01 RX ADMIN — VANCOMYCIN HYDROCHLORIDE 750 MG: 750 INJECTION, POWDER, LYOPHILIZED, FOR SOLUTION INTRAVENOUS at 12:14

## 2022-01-01 RX ADMIN — DEXTROSE MONOHYDRATE: 50 INJECTION, SOLUTION INTRAVENOUS at 12:58

## 2022-01-01 RX ADMIN — HEPARIN SODIUM 5000 UNITS: 5000 INJECTION INTRAVENOUS; SUBCUTANEOUS at 18:00

## 2022-01-01 RX ADMIN — PIPERACILLIN SODIUM AND TAZOBACTAM SODIUM 3.38 G: 3; .375 INJECTION, POWDER, LYOPHILIZED, FOR SOLUTION INTRAVENOUS at 18:57

## 2022-01-01 RX ADMIN — BRIMONIDINE TARTRATE 1 DROP: 2 SOLUTION OPHTHALMIC at 16:47

## 2022-01-01 RX ADMIN — PIPERACILLIN SODIUM AND TAZOBACTAM SODIUM 3.38 G: 3; .375 INJECTION, POWDER, LYOPHILIZED, FOR SOLUTION INTRAVENOUS at 02:08

## 2022-01-01 RX ADMIN — METOPROLOL TARTRATE 12.5 MG: 25 TABLET, FILM COATED ORAL at 21:04

## 2022-01-01 RX ADMIN — CHOLESTYRAMINE 4 G: 4 POWDER, FOR SUSPENSION ORAL at 16:47

## 2022-01-01 RX ADMIN — POTASSIUM CHLORIDE 10 MEQ: 7.46 INJECTION, SOLUTION INTRAVENOUS at 10:56

## 2022-01-01 RX ADMIN — LIDOCAINE HYDROCHLORIDE 60 MG: 20 INJECTION, SOLUTION EPIDURAL; INFILTRATION; INTRACAUDAL; PERINEURAL at 14:23

## 2022-01-01 RX ADMIN — LATANOPROST 1 DROP: 50 SOLUTION OPHTHALMIC at 18:23

## 2022-01-01 RX ADMIN — DEXTROSE MONOHYDRATE 80 ML/HR: 50 INJECTION, SOLUTION INTRAVENOUS at 13:54

## 2022-01-01 RX ADMIN — NITROGLYCERIN 1 INCH: 20 OINTMENT TOPICAL at 01:02

## 2022-01-01 RX ADMIN — ASPIRIN 81 MG: 81 TABLET, COATED ORAL at 09:37

## 2022-01-01 RX ADMIN — GABAPENTIN 300 MG: 300 CAPSULE ORAL at 09:32

## 2022-01-01 RX ADMIN — SODIUM CHLORIDE, POTASSIUM CHLORIDE, SODIUM LACTATE AND CALCIUM CHLORIDE: 600; 310; 30; 20 INJECTION, SOLUTION INTRAVENOUS at 16:30

## 2022-01-01 RX ADMIN — FUROSEMIDE 40 MG: 10 INJECTION, SOLUTION INTRAMUSCULAR; INTRAVENOUS at 21:32

## 2022-01-01 RX ADMIN — LABETALOL HYDROCHLORIDE 20 MG: 5 INJECTION, SOLUTION INTRAVENOUS at 05:10

## 2022-01-01 RX ADMIN — ATORVASTATIN CALCIUM 80 MG: 40 TABLET, FILM COATED ORAL at 09:05

## 2022-01-01 RX ADMIN — IPRATROPIUM BROMIDE AND ALBUTEROL SULFATE 3 ML: .5; 3 SOLUTION RESPIRATORY (INHALATION) at 07:50

## 2022-01-01 RX ADMIN — MEROPENEM 1 G: 1 INJECTION, POWDER, FOR SOLUTION INTRAVENOUS at 08:31

## 2022-01-01 RX ADMIN — Medication 1 SPRAY: at 16:31

## 2022-01-01 RX ADMIN — FUROSEMIDE 40 MG: 10 INJECTION, SOLUTION INTRAMUSCULAR; INTRAVENOUS at 20:30

## 2022-01-01 RX ADMIN — SODIUM CHLORIDE 3 G: 900 INJECTION INTRAVENOUS at 05:17

## 2022-01-01 RX ADMIN — HYDROCODONE BITARTRATE AND ACETAMINOPHEN 1 TABLET: 5; 325 TABLET ORAL at 14:29

## 2022-01-01 RX ADMIN — SODIUM CHLORIDE 25 ML/HR: 4.5 INJECTION, SOLUTION INTRAVENOUS at 19:09

## 2022-01-01 RX ADMIN — HEPARIN SODIUM 5000 UNITS: 5000 INJECTION INTRAVENOUS; SUBCUTANEOUS at 15:21

## 2022-01-01 RX ADMIN — SACUBITRIL AND VALSARTAN 1 TABLET: 24; 26 TABLET, FILM COATED ORAL at 20:36

## 2022-01-01 RX ADMIN — INSULIN LISPRO 1 UNITS: 100 INJECTION, SOLUTION INTRAVENOUS; SUBCUTANEOUS at 05:43

## 2022-01-01 RX ADMIN — FERROUS SULFATE TAB 325 MG (65 MG ELEMENTAL FE) 325 MG: 325 (65 FE) TAB at 21:44

## 2022-01-01 RX ADMIN — SODIUM CHLORIDE 3 G: 900 INJECTION INTRAVENOUS at 21:31

## 2022-01-01 RX ADMIN — SODIUM CHLORIDE, PRESERVATIVE FREE 10 ML: 5 INJECTION INTRAVENOUS at 22:56

## 2022-01-01 RX ADMIN — FERROUS SULFATE TAB 325 MG (65 MG ELEMENTAL FE) 325 MG: 325 (65 FE) TAB at 11:17

## 2022-01-01 RX ADMIN — ASPIRIN 81 MG: 81 TABLET, COATED ORAL at 08:27

## 2022-01-01 RX ADMIN — HYDRALAZINE HYDROCHLORIDE 50 MG: 50 TABLET, FILM COATED ORAL at 16:42

## 2022-01-01 RX ADMIN — FUROSEMIDE 20 MG: 10 INJECTION, SOLUTION INTRAMUSCULAR; INTRAVENOUS at 08:35

## 2022-01-01 RX ADMIN — INSULIN LISPRO 10 UNITS: 100 INJECTION, SOLUTION INTRAVENOUS; SUBCUTANEOUS at 00:09

## 2022-01-01 RX ADMIN — SODIUM CHLORIDE 3 G: 900 INJECTION INTRAVENOUS at 22:57

## 2022-01-01 RX ADMIN — GABAPENTIN 300 MG: 300 CAPSULE ORAL at 11:16

## 2022-01-01 RX ADMIN — SODIUM CHLORIDE, PRESERVATIVE FREE 10 ML: 5 INJECTION INTRAVENOUS at 05:13

## 2022-01-01 RX ADMIN — FUROSEMIDE 20 MG: 10 INJECTION, SOLUTION INTRAMUSCULAR; INTRAVENOUS at 09:20

## 2022-01-01 RX ADMIN — FUROSEMIDE 20 MG: 10 INJECTION, SOLUTION INTRAMUSCULAR; INTRAVENOUS at 21:58

## 2022-01-01 RX ADMIN — ASPIRIN 81 MG: 81 TABLET, COATED ORAL at 09:32

## 2022-01-01 RX ADMIN — BRIMONIDINE TARTRATE 1 DROP: 2 SOLUTION/ DROPS OPHTHALMIC at 08:18

## 2022-01-01 RX ADMIN — SODIUM CHLORIDE, PRESERVATIVE FREE 10 ML: 5 INJECTION INTRAVENOUS at 21:23

## 2022-01-01 RX ADMIN — NITROGLYCERIN 1 INCH: 20 OINTMENT TOPICAL at 17:30

## 2022-01-01 RX ADMIN — BRIMONIDINE TARTRATE 1 DROP: 2 SOLUTION OPHTHALMIC at 09:19

## 2022-01-01 RX ADMIN — HYOSCYAMINE SULFATE: 16 SOLUTION at 21:00

## 2022-01-01 RX ADMIN — ENOXAPARIN SODIUM 40 MG: 100 INJECTION SUBCUTANEOUS at 11:13

## 2022-01-01 RX ADMIN — INSULIN GLARGINE 22 UNITS: 100 INJECTION, SOLUTION SUBCUTANEOUS at 08:01

## 2022-01-01 RX ADMIN — METOPROLOL TARTRATE 50 MG: 50 TABLET, FILM COATED ORAL at 08:55

## 2022-01-01 RX ADMIN — DEXTROSE MONOHYDRATE 75 ML/HR: 50 INJECTION, SOLUTION INTRAVENOUS at 05:54

## 2022-01-01 RX ADMIN — BRIMONIDINE TARTRATE 1 DROP: 2 SOLUTION OPHTHALMIC at 16:45

## 2022-01-01 RX ADMIN — INSULIN LISPRO 3 UNITS: 100 INJECTION, SOLUTION INTRAVENOUS; SUBCUTANEOUS at 18:24

## 2022-01-01 RX ADMIN — HYDRALAZINE HYDROCHLORIDE 20 MG: 20 INJECTION, SOLUTION INTRAMUSCULAR; INTRAVENOUS at 05:21

## 2022-01-01 RX ADMIN — HEPARIN SODIUM 5000 UNITS: 5000 INJECTION INTRAVENOUS; SUBCUTANEOUS at 09:45

## 2022-01-01 RX ADMIN — LORAZEPAM 1 MG: 2 INJECTION INTRAMUSCULAR at 13:12

## 2022-01-01 RX ADMIN — ASPIRIN 81 MG: 81 TABLET, COATED ORAL at 08:06

## 2022-01-01 RX ADMIN — ATORVASTATIN CALCIUM 20 MG: 20 TABLET, FILM COATED ORAL at 21:50

## 2022-01-01 RX ADMIN — BRIMONIDINE TARTRATE 1 DROP: 2 SOLUTION/ DROPS OPHTHALMIC at 17:42

## 2022-01-01 RX ADMIN — METHYLPREDNISOLONE SODIUM SUCCINATE 40 MG: 40 INJECTION, POWDER, FOR SOLUTION INTRAMUSCULAR; INTRAVENOUS at 12:32

## 2022-01-01 RX ADMIN — INSULIN GLARGINE 50 UNITS: 100 INJECTION, SOLUTION SUBCUTANEOUS at 09:00

## 2022-01-01 RX ADMIN — DEXTROSE MONOHYDRATE 100 ML/HR: 50 INJECTION, SOLUTION INTRAVENOUS at 10:35

## 2022-01-01 RX ADMIN — HYDROMORPHONE HYDROCHLORIDE 1 MG: 1 INJECTION, SOLUTION INTRAMUSCULAR; INTRAVENOUS; SUBCUTANEOUS at 09:03

## 2022-01-01 RX ADMIN — INSULIN GLARGINE 22 UNITS: 100 INJECTION, SOLUTION SUBCUTANEOUS at 10:09

## 2022-01-01 RX ADMIN — INSULIN LISPRO 15 UNITS: 100 INJECTION, SOLUTION INTRAVENOUS; SUBCUTANEOUS at 17:50

## 2022-01-01 RX ADMIN — HYDRALAZINE HYDROCHLORIDE 50 MG: 50 TABLET, FILM COATED ORAL at 17:40

## 2022-01-01 RX ADMIN — HEPARIN SODIUM 5000 UNITS: 5000 INJECTION INTRAVENOUS; SUBCUTANEOUS at 05:10

## 2022-01-01 RX ADMIN — LATANOPROST 1 DROP: 50 SOLUTION OPHTHALMIC at 20:00

## 2022-01-01 RX ADMIN — FLUCYTOSINE 500 MG: 500 CAPSULE ORAL at 10:12

## 2022-01-01 RX ADMIN — INSULIN LISPRO 7 UNITS: 100 INJECTION, SOLUTION INTRAVENOUS; SUBCUTANEOUS at 18:08

## 2022-01-01 RX ADMIN — ASPIRIN 81 MG: 81 TABLET, COATED ORAL at 09:43

## 2022-01-01 RX ADMIN — BRIMONIDINE TARTRATE 1 DROP: 2 SOLUTION OPHTHALMIC at 16:18

## 2022-01-01 RX ADMIN — CHOLESTYRAMINE 4 G: 4 POWDER, FOR SUSPENSION ORAL at 17:11

## 2022-01-01 RX ADMIN — BRIMONIDINE TARTRATE 1 DROP: 2 SOLUTION OPHTHALMIC at 10:02

## 2022-01-01 RX ADMIN — GABAPENTIN 300 MG: 300 CAPSULE ORAL at 22:10

## 2022-01-01 RX ADMIN — CHOLESTYRAMINE 4 G: 4 POWDER, FOR SUSPENSION ORAL at 16:13

## 2022-01-01 RX ADMIN — SODIUM BICARBONATE 50 MEQ: 84 INJECTION INTRAVENOUS at 10:38

## 2022-01-01 RX ADMIN — ASPIRIN 81 MG: 81 TABLET, COATED ORAL at 08:43

## 2022-01-01 RX ADMIN — INSULIN LISPRO 10 UNITS: 100 INJECTION, SOLUTION INTRAVENOUS; SUBCUTANEOUS at 06:00

## 2022-01-01 RX ADMIN — INSULIN LISPRO 12 UNITS: 100 INJECTION, SOLUTION INTRAVENOUS; SUBCUTANEOUS at 23:55

## 2022-01-01 RX ADMIN — BRIMONIDINE TARTRATE 1 DROP: 2 SOLUTION OPHTHALMIC at 16:41

## 2022-01-01 RX ADMIN — BRIMONIDINE TARTRATE 1 DROP: 2 SOLUTION OPHTHALMIC at 17:44

## 2022-01-01 RX ADMIN — HYDROMORPHONE HYDROCHLORIDE 1 MG: 1 INJECTION, SOLUTION INTRAMUSCULAR; INTRAVENOUS; SUBCUTANEOUS at 05:20

## 2022-01-01 RX ADMIN — BRIMONIDINE TARTRATE 1 DROP: 2 SOLUTION OPHTHALMIC at 23:43

## 2022-01-01 RX ADMIN — SODIUM CHLORIDE 50 ML/HR: 4.5 INJECTION, SOLUTION INTRAVENOUS at 10:40

## 2022-01-01 RX ADMIN — SODIUM CHLORIDE 3 G: 900 INJECTION INTRAVENOUS at 05:49

## 2022-01-01 RX ADMIN — ASPIRIN 81 MG: 81 TABLET, COATED ORAL at 10:26

## 2022-01-01 RX ADMIN — GLYCOPYRROLATE 0.1 MG: 0.2 INJECTION INTRAMUSCULAR; INTRAVENOUS at 10:23

## 2022-01-01 RX ADMIN — FAMOTIDINE 20 MG: 20 TABLET ORAL at 21:43

## 2022-01-01 RX ADMIN — PIPERACILLIN SODIUM AND TAZOBACTAM SODIUM 3.38 G: 3; .375 INJECTION, POWDER, LYOPHILIZED, FOR SOLUTION INTRAVENOUS at 03:06

## 2022-01-01 RX ADMIN — Medication 2 TABLET: at 10:06

## 2022-01-01 RX ADMIN — HEPARIN SODIUM 5000 UNITS: 5000 INJECTION INTRAVENOUS; SUBCUTANEOUS at 00:46

## 2022-01-01 RX ADMIN — HEPARIN SODIUM 5000 UNITS: 5000 INJECTION INTRAVENOUS; SUBCUTANEOUS at 22:05

## 2022-01-01 RX ADMIN — HYOSCYAMINE SULFATE: 16 SOLUTION at 08:07

## 2022-01-01 RX ADMIN — CHOLESTYRAMINE 4 G: 4 POWDER, FOR SUSPENSION ORAL at 08:15

## 2022-01-01 RX ADMIN — FERROUS SULFATE TAB 325 MG (65 MG ELEMENTAL FE) 325 MG: 325 (65 FE) TAB at 22:01

## 2022-01-01 RX ADMIN — METHYLPREDNISOLONE SODIUM SUCCINATE 40 MG: 40 INJECTION, POWDER, FOR SOLUTION INTRAMUSCULAR; INTRAVENOUS at 18:25

## 2022-01-01 RX ADMIN — INSULIN LISPRO 3 UNITS: 100 INJECTION, SOLUTION INTRAVENOUS; SUBCUTANEOUS at 12:25

## 2022-01-01 RX ADMIN — CHOLESTYRAMINE 4 G: 4 POWDER, FOR SUSPENSION ORAL at 17:01

## 2022-01-01 RX ADMIN — LATANOPROST 1 DROP: 50 SOLUTION OPHTHALMIC at 17:23

## 2022-01-01 RX ADMIN — HYDRALAZINE HYDROCHLORIDE 12.5 MG: 25 TABLET, FILM COATED ORAL at 22:17

## 2022-01-01 RX ADMIN — ENOXAPARIN SODIUM 40 MG: 100 INJECTION SUBCUTANEOUS at 09:53

## 2022-01-01 RX ADMIN — BRIMONIDINE TARTRATE 1 DROP: 2 SOLUTION OPHTHALMIC at 15:43

## 2022-01-01 RX ADMIN — SODIUM CHLORIDE, PRESERVATIVE FREE 10 ML: 5 INJECTION INTRAVENOUS at 16:22

## 2022-01-01 RX ADMIN — ASPIRIN 81 MG: 81 TABLET, COATED ORAL at 08:17

## 2022-01-01 RX ADMIN — INSULIN LISPRO 1 UNITS: 100 INJECTION, SOLUTION INTRAVENOUS; SUBCUTANEOUS at 00:41

## 2022-01-01 RX ADMIN — SACUBITRIL AND VALSARTAN 1 TABLET: 24; 26 TABLET, FILM COATED ORAL at 21:30

## 2022-01-01 RX ADMIN — SODIUM CHLORIDE 3 G: 900 INJECTION INTRAVENOUS at 05:47

## 2022-01-01 RX ADMIN — INSULIN LISPRO 4 UNITS: 100 INJECTION, SOLUTION INTRAVENOUS; SUBCUTANEOUS at 06:38

## 2022-01-01 RX ADMIN — PROPOFOL 25 MCG/KG/MIN: 10 INJECTION, EMULSION INTRAVENOUS at 14:25

## 2022-01-01 RX ADMIN — FLUCONAZOLE 200 MG: 100 TABLET ORAL at 10:14

## 2022-01-01 RX ADMIN — ASPIRIN 81 MG CHEWABLE TABLET 81 MG: 81 TABLET CHEWABLE at 09:53

## 2022-01-01 RX ADMIN — INSULIN LISPRO 15 UNITS: 100 INJECTION, SOLUTION INTRAVENOUS; SUBCUTANEOUS at 12:38

## 2022-01-01 RX ADMIN — HYDRALAZINE HYDROCHLORIDE 50 MG: 50 TABLET, FILM COATED ORAL at 09:32

## 2022-01-01 RX ADMIN — FAMOTIDINE 20 MG: 20 TABLET ORAL at 21:15

## 2022-01-01 RX ADMIN — FENOFIBRATE 48 MG: 48 TABLET ORAL at 11:43

## 2022-01-01 RX ADMIN — METOPROLOL TARTRATE 50 MG: 50 TABLET, FILM COATED ORAL at 10:23

## 2022-01-01 RX ADMIN — HYDRALAZINE HYDROCHLORIDE 50 MG: 50 TABLET, FILM COATED ORAL at 23:39

## 2022-01-01 RX ADMIN — CHOLESTYRAMINE 4 G: 4 POWDER, FOR SUSPENSION ORAL at 17:16

## 2022-01-01 RX ADMIN — CALCIUM POLYCARBOPHIL 625 MG: 625 TABLET, FILM COATED ORAL at 09:15

## 2022-01-01 RX ADMIN — GABAPENTIN 300 MG: 300 CAPSULE ORAL at 08:51

## 2022-01-01 RX ADMIN — INSULIN LISPRO 1 UNITS: 100 INJECTION, SOLUTION INTRAVENOUS; SUBCUTANEOUS at 23:50

## 2022-01-01 RX ADMIN — LATANOPROST 1 DROP: 50 SOLUTION OPHTHALMIC at 17:10

## 2022-01-01 RX ADMIN — FERROUS SULFATE TAB 325 MG (65 MG ELEMENTAL FE) 325 MG: 325 (65 FE) TAB at 21:51

## 2022-01-01 RX ADMIN — INSULIN LISPRO 6 UNITS: 100 INJECTION, SOLUTION INTRAVENOUS; SUBCUTANEOUS at 06:06

## 2022-01-01 RX ADMIN — ASPIRIN 81 MG: 81 TABLET, COATED ORAL at 10:01

## 2022-01-01 RX ADMIN — FERROUS SULFATE TAB 325 MG (65 MG ELEMENTAL FE) 325 MG: 325 (65 FE) TAB at 20:28

## 2022-01-01 RX ADMIN — Medication 1 SPRAY: at 22:58

## 2022-01-01 RX ADMIN — LORAZEPAM 1 MG: 2 INJECTION INTRAMUSCULAR at 21:32

## 2022-01-01 RX ADMIN — SODIUM CHLORIDE, PRESERVATIVE FREE 5 ML: 5 INJECTION INTRAVENOUS at 14:00

## 2022-01-01 RX ADMIN — ATORVASTATIN CALCIUM 20 MG: 20 TABLET, FILM COATED ORAL at 22:28

## 2022-01-01 RX ADMIN — FAMOTIDINE 20 MG: 20 TABLET ORAL at 21:16

## 2022-01-01 RX ADMIN — GABAPENTIN 300 MG: 300 CAPSULE ORAL at 22:06

## 2022-01-01 RX ADMIN — PSYLLIUM HUSK 1 PACKET: 3.4 POWDER ORAL at 09:17

## 2022-01-01 RX ADMIN — HEPARIN SODIUM 5000 UNITS: 5000 INJECTION INTRAVENOUS; SUBCUTANEOUS at 16:55

## 2022-01-01 RX ADMIN — SODIUM CHLORIDE, PRESERVATIVE FREE 10 ML: 5 INJECTION INTRAVENOUS at 05:25

## 2022-01-01 RX ADMIN — SODIUM CHLORIDE 3 G: 900 INJECTION INTRAVENOUS at 05:31

## 2022-01-01 RX ADMIN — INSULIN LISPRO 4 UNITS: 100 INJECTION, SOLUTION INTRAVENOUS; SUBCUTANEOUS at 06:01

## 2022-01-01 RX ADMIN — FAMOTIDINE 20 MG: 20 TABLET ORAL at 22:33

## 2022-01-01 RX ADMIN — NITROGLYCERIN 1 INCH: 20 OINTMENT TOPICAL at 05:59

## 2022-01-01 RX ADMIN — Medication 1 SPRAY: at 10:53

## 2022-01-01 RX ADMIN — MINERAL SUPPLEMENT IRON 300 MG / 5 ML STRENGTH LIQUID 100 PER BOX UNFLAVORED 300 MG: at 20:13

## 2022-01-01 RX ADMIN — HYDROCODONE BITARTRATE AND ACETAMINOPHEN 1 TABLET: 5; 325 TABLET ORAL at 09:10

## 2022-01-01 RX ADMIN — HEPARIN SODIUM 5000 UNITS: 5000 INJECTION INTRAVENOUS; SUBCUTANEOUS at 18:12

## 2022-01-01 RX ADMIN — PROPOFOL 20 MG: 10 INJECTION, EMULSION INTRAVENOUS at 13:05

## 2022-01-01 RX ADMIN — BRIMONIDINE TARTRATE 1 DROP: 2 SOLUTION/ DROPS OPHTHALMIC at 21:06

## 2022-01-01 RX ADMIN — NITROGLYCERIN 1 INCH: 20 OINTMENT TOPICAL at 00:44

## 2022-01-01 RX ADMIN — INSULIN GLARGINE 50 UNITS: 100 INJECTION, SOLUTION SUBCUTANEOUS at 21:10

## 2022-01-01 RX ADMIN — AMLODIPINE BESYLATE 10 MG: 5 TABLET ORAL at 09:10

## 2022-01-01 RX ADMIN — FLUCONAZOLE 200 MG: 200 INJECTION, SOLUTION INTRAVENOUS at 11:16

## 2022-01-01 RX ADMIN — HEPARIN SODIUM 5000 UNITS: 5000 INJECTION INTRAVENOUS; SUBCUTANEOUS at 08:56

## 2022-01-01 RX ADMIN — Medication 1 SPRAY: at 21:17

## 2022-01-01 RX ADMIN — SODIUM CHLORIDE, PRESERVATIVE FREE 10 ML: 5 INJECTION INTRAVENOUS at 05:17

## 2022-01-01 RX ADMIN — PROPOFOL 20 MCG/KG/MIN: 10 INJECTION, EMULSION INTRAVENOUS at 04:31

## 2022-01-01 RX ADMIN — PIPERACILLIN SODIUM AND TAZOBACTAM SODIUM 3.38 G: 3; .375 INJECTION, POWDER, LYOPHILIZED, FOR SOLUTION INTRAVENOUS at 02:55

## 2022-01-01 RX ADMIN — GABAPENTIN 300 MG: 300 CAPSULE ORAL at 20:31

## 2022-01-01 RX ADMIN — SODIUM CHLORIDE, PRESERVATIVE FREE 10 ML: 5 INJECTION INTRAVENOUS at 21:17

## 2022-01-01 RX ADMIN — SODIUM CHLORIDE, PRESERVATIVE FREE 10 ML: 5 INJECTION INTRAVENOUS at 08:08

## 2022-01-01 RX ADMIN — TRAMADOL HYDROCHLORIDE 25 MG: 50 TABLET, COATED ORAL at 17:17

## 2022-01-01 RX ADMIN — AMLODIPINE BESYLATE 5 MG: 5 TABLET ORAL at 09:00

## 2022-01-01 RX ADMIN — ENOXAPARIN SODIUM 40 MG: 100 INJECTION SUBCUTANEOUS at 08:43

## 2022-01-01 RX ADMIN — Medication 2 TABLET: at 10:30

## 2022-01-01 RX ADMIN — BRIMONIDINE TARTRATE 1 DROP: 2 SOLUTION OPHTHALMIC at 15:34

## 2022-01-01 RX ADMIN — MINERAL SUPPLEMENT IRON 300 MG / 5 ML STRENGTH LIQUID 100 PER BOX UNFLAVORED 300 MG: at 22:01

## 2022-01-01 RX ADMIN — MINERAL SUPPLEMENT IRON 300 MG / 5 ML STRENGTH LIQUID 100 PER BOX UNFLAVORED 300 MG: at 08:49

## 2022-01-01 RX ADMIN — INSULIN LISPRO 4 UNITS: 100 INJECTION, SOLUTION INTRAVENOUS; SUBCUTANEOUS at 11:42

## 2022-01-01 RX ADMIN — METOPROLOL TARTRATE 50 MG: 50 TABLET, FILM COATED ORAL at 08:42

## 2022-01-01 RX ADMIN — LOPERAMIDE HYDROCHLORIDE 2 MG: 2 CAPSULE ORAL at 08:16

## 2022-01-01 RX ADMIN — FERROUS SULFATE TAB 325 MG (65 MG ELEMENTAL FE) 325 MG: 325 (65 FE) TAB at 08:59

## 2022-01-01 RX ADMIN — LATANOPROST 1 DROP: 50 SOLUTION OPHTHALMIC at 17:30

## 2022-01-01 RX ADMIN — INSULIN GLARGINE 10 UNITS: 100 INJECTION, SOLUTION SUBCUTANEOUS at 23:48

## 2022-01-01 RX ADMIN — FAMOTIDINE 20 MG: 20 TABLET ORAL at 08:58

## 2022-01-01 RX ADMIN — Medication 1 SPRAY: at 16:15

## 2022-01-01 RX ADMIN — METOPROLOL SUCCINATE 50 MG: 50 TABLET, EXTENDED RELEASE ORAL at 21:52

## 2022-01-01 RX ADMIN — SODIUM CHLORIDE, PRESERVATIVE FREE 10 ML: 5 INJECTION INTRAVENOUS at 13:49

## 2022-01-01 RX ADMIN — FUROSEMIDE 40 MG: 10 INJECTION, SOLUTION INTRAMUSCULAR; INTRAVENOUS at 11:10

## 2022-01-01 RX ADMIN — NITROGLYCERIN 1 INCH: 20 OINTMENT TOPICAL at 17:06

## 2022-01-01 RX ADMIN — ATORVASTATIN CALCIUM 20 MG: 20 TABLET, FILM COATED ORAL at 22:51

## 2022-01-01 RX ADMIN — Medication 2 TABLET: at 10:24

## 2022-01-01 RX ADMIN — ASPIRIN 81 MG CHEWABLE TABLET 81 MG: 81 TABLET CHEWABLE at 09:15

## 2022-01-01 RX ADMIN — CEFTAZIDIME 1 G: 1 INJECTION, POWDER, FOR SOLUTION INTRAMUSCULAR; INTRAVENOUS at 23:26

## 2022-01-01 RX ADMIN — INSULIN GLARGINE 22 UNITS: 100 INJECTION, SOLUTION SUBCUTANEOUS at 09:36

## 2022-01-01 RX ADMIN — INSULIN LISPRO 1 UNITS: 100 INJECTION, SOLUTION INTRAVENOUS; SUBCUTANEOUS at 00:00

## 2022-01-01 RX ADMIN — NITROGLYCERIN 1 INCH: 20 OINTMENT TOPICAL at 12:50

## 2022-01-01 RX ADMIN — INSULIN LISPRO 4 UNITS: 100 INJECTION, SOLUTION INTRAVENOUS; SUBCUTANEOUS at 05:33

## 2022-01-01 RX ADMIN — INSULIN LISPRO 4 UNITS: 100 INJECTION, SOLUTION INTRAVENOUS; SUBCUTANEOUS at 18:19

## 2022-01-01 RX ADMIN — PSYLLIUM HUSK 1 PACKET: 3.4 POWDER ORAL at 20:56

## 2022-01-01 RX ADMIN — FUROSEMIDE 20 MG: 10 INJECTION, SOLUTION INTRAMUSCULAR; INTRAVENOUS at 20:50

## 2022-01-01 RX ADMIN — Medication: at 09:36

## 2022-01-01 RX ADMIN — GABAPENTIN 300 MG: 300 CAPSULE ORAL at 08:00

## 2022-01-01 RX ADMIN — BRIMONIDINE TARTRATE 1 DROP: 2 SOLUTION OPHTHALMIC at 09:29

## 2022-01-01 RX ADMIN — BRIMONIDINE TARTRATE 1 DROP: 2 SOLUTION/ DROPS OPHTHALMIC at 21:28

## 2022-01-01 RX ADMIN — INSULIN GLARGINE 10 UNITS: 100 INJECTION, SOLUTION SUBCUTANEOUS at 21:52

## 2022-01-01 RX ADMIN — SODIUM CHLORIDE, PRESERVATIVE FREE 10 ML: 5 INJECTION INTRAVENOUS at 15:52

## 2022-01-01 RX ADMIN — GLYCOPYRROLATE 0.1 MG: 0.2 INJECTION INTRAMUSCULAR; INTRAVENOUS at 03:25

## 2022-01-01 RX ADMIN — Medication 1 SPRAY: at 15:17

## 2022-01-01 RX ADMIN — HEPARIN SODIUM 5000 UNITS: 5000 INJECTION INTRAVENOUS; SUBCUTANEOUS at 05:55

## 2022-01-01 RX ADMIN — PROPOFOL 20 MCG/KG/MIN: 10 INJECTION, EMULSION INTRAVENOUS at 21:26

## 2022-01-01 RX ADMIN — IPRATROPIUM BROMIDE AND ALBUTEROL SULFATE 3 ML: .5; 3 SOLUTION RESPIRATORY (INHALATION) at 19:40

## 2022-01-01 RX ADMIN — HYDRALAZINE HYDROCHLORIDE 12.5 MG: 25 TABLET, FILM COATED ORAL at 08:09

## 2022-01-01 RX ADMIN — GLYCOPYRROLATE 0.1 MG: 0.2 INJECTION INTRAMUSCULAR; INTRAVENOUS at 04:31

## 2022-01-01 RX ADMIN — MINERAL SUPPLEMENT IRON 300 MG / 5 ML STRENGTH LIQUID 100 PER BOX UNFLAVORED 300 MG: at 21:50

## 2022-01-01 RX ADMIN — SODIUM CHLORIDE, PRESERVATIVE FREE 10 ML: 5 INJECTION INTRAVENOUS at 06:18

## 2022-01-01 RX ADMIN — FERROUS SULFATE TAB 325 MG (65 MG ELEMENTAL FE) 325 MG: 325 (65 FE) TAB at 21:52

## 2022-01-01 RX ADMIN — NITROGLYCERIN 1 INCH: 20 OINTMENT TOPICAL at 21:21

## 2022-01-01 RX ADMIN — TRAMADOL HYDROCHLORIDE 25 MG: 50 TABLET, COATED ORAL at 11:15

## 2022-01-01 RX ADMIN — INSULIN GLARGINE 25 UNITS: 100 INJECTION, SOLUTION SUBCUTANEOUS at 01:29

## 2022-01-01 RX ADMIN — GABAPENTIN 300 MG: 300 CAPSULE ORAL at 15:34

## 2022-01-01 RX ADMIN — INSULIN LISPRO 9 UNITS: 100 INJECTION, SOLUTION INTRAVENOUS; SUBCUTANEOUS at 06:30

## 2022-01-01 RX ADMIN — HEPARIN SODIUM 5000 UNITS: 5000 INJECTION INTRAVENOUS; SUBCUTANEOUS at 08:32

## 2022-01-01 RX ADMIN — LATANOPROST 1 DROP: 50 SOLUTION OPHTHALMIC at 17:12

## 2022-01-01 RX ADMIN — PSYLLIUM HUSK 1 PACKET: 3.4 POWDER ORAL at 10:37

## 2022-01-01 RX ADMIN — MINERAL SUPPLEMENT IRON 300 MG / 5 ML STRENGTH LIQUID 100 PER BOX UNFLAVORED 300 MG: at 17:10

## 2022-01-01 RX ADMIN — BRIMONIDINE TARTRATE 1 DROP: 2 SOLUTION OPHTHALMIC at 18:09

## 2022-01-01 RX ADMIN — HEPARIN SODIUM 5000 UNITS: 5000 INJECTION INTRAVENOUS; SUBCUTANEOUS at 15:22

## 2022-01-01 RX ADMIN — INSULIN LISPRO 5 UNITS: 100 INJECTION, SOLUTION INTRAVENOUS; SUBCUTANEOUS at 06:25

## 2022-01-01 RX ADMIN — FERROUS SULFATE TAB 325 MG (65 MG ELEMENTAL FE) 325 MG: 325 (65 FE) TAB at 10:02

## 2022-01-01 RX ADMIN — INSULIN LISPRO 9 UNITS: 100 INJECTION, SOLUTION INTRAVENOUS; SUBCUTANEOUS at 11:20

## 2022-01-01 RX ADMIN — BRIMONIDINE TARTRATE 1 DROP: 2 SOLUTION/ DROPS OPHTHALMIC at 08:20

## 2022-01-01 RX ADMIN — ASPIRIN 81 MG: 81 TABLET, COATED ORAL at 09:59

## 2022-01-01 RX ADMIN — MEROPENEM 1 G: 1 INJECTION, POWDER, FOR SOLUTION INTRAVENOUS at 10:15

## 2022-01-01 RX ADMIN — SACUBITRIL AND VALSARTAN 1 TABLET: 24; 26 TABLET, FILM COATED ORAL at 21:03

## 2022-01-01 RX ADMIN — INSULIN LISPRO 6 UNITS: 100 INJECTION, SOLUTION INTRAVENOUS; SUBCUTANEOUS at 12:35

## 2022-01-01 RX ADMIN — BRIMONIDINE TARTRATE 1 DROP: 2 SOLUTION/ DROPS OPHTHALMIC at 16:31

## 2022-01-01 RX ADMIN — PIPERACILLIN SODIUM AND TAZOBACTAM SODIUM 3.38 G: 3; .375 INJECTION, POWDER, LYOPHILIZED, FOR SOLUTION INTRAVENOUS at 18:46

## 2022-01-01 RX ADMIN — FAMOTIDINE 20 MG: 20 TABLET ORAL at 10:22

## 2022-01-01 RX ADMIN — PIPERACILLIN SODIUM AND TAZOBACTAM SODIUM 3.38 G: 3; .375 INJECTION, POWDER, LYOPHILIZED, FOR SOLUTION INTRAVENOUS at 08:30

## 2022-01-01 RX ADMIN — AMLODIPINE BESYLATE 5 MG: 5 TABLET ORAL at 10:01

## 2022-01-01 RX ADMIN — BRIMONIDINE TARTRATE 1 DROP: 2 SOLUTION/ DROPS OPHTHALMIC at 20:49

## 2022-01-01 RX ADMIN — INSULIN GLARGINE 32 UNITS: 100 INJECTION, SOLUTION SUBCUTANEOUS at 22:50

## 2022-01-01 RX ADMIN — SODIUM CHLORIDE 3 G: 900 INJECTION INTRAVENOUS at 18:04

## 2022-01-01 RX ADMIN — SODIUM BICARBONATE 50 MEQ: 84 INJECTION INTRAVENOUS at 01:12

## 2022-01-01 RX ADMIN — Medication 1 SPRAY: at 21:29

## 2022-01-01 RX ADMIN — DICYCLOMINE HYDROCHLORIDE 10 MG: 10 CAPSULE ORAL at 09:01

## 2022-01-01 RX ADMIN — BRIMONIDINE TARTRATE 1 DROP: 2 SOLUTION/ DROPS OPHTHALMIC at 08:28

## 2022-01-01 RX ADMIN — INSULIN LISPRO 1 UNITS: 100 INJECTION, SOLUTION INTRAVENOUS; SUBCUTANEOUS at 06:13

## 2022-01-01 RX ADMIN — DEXTROSE MONOHYDRATE 30 ML/HR: 50 INJECTION, SOLUTION INTRAVENOUS at 06:11

## 2022-01-01 RX ADMIN — LORAZEPAM 1 MG: 2 INJECTION INTRAMUSCULAR at 01:36

## 2022-01-01 RX ADMIN — PIPERACILLIN AND TAZOBACTAM 3.38 G: 3; .375 INJECTION, POWDER, LYOPHILIZED, FOR SOLUTION INTRAVENOUS at 04:00

## 2022-01-01 RX ADMIN — HYDROMORPHONE HYDROCHLORIDE 1 MG: 1 INJECTION, SOLUTION INTRAMUSCULAR; INTRAVENOUS; SUBCUTANEOUS at 05:30

## 2022-01-01 RX ADMIN — FENTANYL CITRATE 25 MCG: 0.05 INJECTION, SOLUTION INTRAMUSCULAR; INTRAVENOUS at 10:32

## 2022-01-01 RX ADMIN — LIDOCAINE HYDROCHLORIDE 60 MG: 20 INJECTION, SOLUTION EPIDURAL; INFILTRATION; INTRACAUDAL; PERINEURAL at 13:05

## 2022-01-01 RX ADMIN — GABAPENTIN 300 MG: 300 CAPSULE ORAL at 21:51

## 2022-01-01 RX ADMIN — HYDROMORPHONE HYDROCHLORIDE 8 MG: 2 TABLET ORAL at 01:20

## 2022-01-01 RX ADMIN — METOPROLOL SUCCINATE 50 MG: 50 TABLET, EXTENDED RELEASE ORAL at 21:10

## 2022-01-01 RX ADMIN — LORAZEPAM 1 MG: 1 TABLET ORAL at 11:32

## 2022-01-01 RX ADMIN — MINERAL SUPPLEMENT IRON 300 MG / 5 ML STRENGTH LIQUID 100 PER BOX UNFLAVORED 300 MG: at 20:32

## 2022-01-01 RX ADMIN — FLUCYTOSINE 500 MG: 500 CAPSULE ORAL at 12:00

## 2022-01-01 RX ADMIN — BRIMONIDINE TARTRATE 1 DROP: 2 SOLUTION OPHTHALMIC at 17:35

## 2022-01-01 RX ADMIN — LATANOPROST 1 DROP: 50 SOLUTION OPHTHALMIC at 18:27

## 2022-01-01 RX ADMIN — INSULIN LISPRO 10 UNITS: 100 INJECTION, SOLUTION INTRAVENOUS; SUBCUTANEOUS at 06:05

## 2022-01-01 RX ADMIN — CHOLESTYRAMINE 4 G: 4 POWDER, FOR SUSPENSION ORAL at 11:19

## 2022-01-01 RX ADMIN — RIVAROXABAN 2.5 MG: 2.5 TABLET, FILM COATED ORAL at 09:11

## 2022-01-01 RX ADMIN — FLUCONAZOLE 200 MG: 200 INJECTION, SOLUTION INTRAVENOUS at 08:46

## 2022-01-01 RX ADMIN — SACUBITRIL AND VALSARTAN 1 TABLET: 24; 26 TABLET, FILM COATED ORAL at 14:02

## 2022-01-01 RX ADMIN — Medication 1 SPRAY: at 23:44

## 2022-01-01 RX ADMIN — BRIMONIDINE TARTRATE 1 DROP: 2 SOLUTION/ DROPS OPHTHALMIC at 17:57

## 2022-01-01 RX ADMIN — LORAZEPAM 1 MG: 2 INJECTION INTRAMUSCULAR at 16:09

## 2022-01-01 RX ADMIN — INSULIN LISPRO 4 UNITS: 100 INJECTION, SOLUTION INTRAVENOUS; SUBCUTANEOUS at 06:43

## 2022-01-01 RX ADMIN — PROPOFOL 20 MCG/KG/MIN: 10 INJECTION, EMULSION INTRAVENOUS at 01:31

## 2022-01-01 RX ADMIN — FERROUS SULFATE TAB 325 MG (65 MG ELEMENTAL FE) 325 MG: 325 (65 FE) TAB at 10:33

## 2022-01-01 RX ADMIN — HYDRALAZINE HYDROCHLORIDE 12.5 MG: 25 TABLET, FILM COATED ORAL at 10:04

## 2022-01-01 RX ADMIN — SODIUM CHLORIDE 250 ML: 9 INJECTION, SOLUTION INTRAVENOUS at 22:32

## 2022-01-01 RX ADMIN — VANCOMYCIN HYDROCHLORIDE 500 MG: 500 INJECTION, POWDER, LYOPHILIZED, FOR SOLUTION INTRAVENOUS at 11:28

## 2022-01-01 RX ADMIN — GLYCOPYRROLATE 0.1 MG: 0.2 INJECTION INTRAMUSCULAR; INTRAVENOUS at 17:56

## 2022-01-01 RX ADMIN — INSULIN LISPRO 7 UNITS: 100 INJECTION, SOLUTION INTRAVENOUS; SUBCUTANEOUS at 00:05

## 2022-01-01 RX ADMIN — INSULIN GLARGINE 10 UNITS: 100 INJECTION, SOLUTION SUBCUTANEOUS at 22:18

## 2022-01-01 RX ADMIN — MINERAL SUPPLEMENT IRON 300 MG / 5 ML STRENGTH LIQUID 100 PER BOX UNFLAVORED 300 MG: at 20:54

## 2022-01-01 RX ADMIN — BRIMONIDINE TARTRATE 1 DROP: 2 SOLUTION/ DROPS OPHTHALMIC at 09:55

## 2022-01-01 RX ADMIN — HYDRALAZINE HYDROCHLORIDE 20 MG: 20 INJECTION, SOLUTION INTRAMUSCULAR; INTRAVENOUS at 20:12

## 2022-01-01 RX ADMIN — FAMOTIDINE 20 MG: 20 TABLET ORAL at 09:29

## 2022-01-01 RX ADMIN — MINERAL SUPPLEMENT IRON 300 MG / 5 ML STRENGTH LIQUID 100 PER BOX UNFLAVORED 300 MG: at 20:55

## 2022-01-01 RX ADMIN — GLYCOPYRROLATE 0.4 MG: 0.2 INJECTION INTRAMUSCULAR; INTRAVENOUS at 17:23

## 2022-01-01 RX ADMIN — Medication 1 SPRAY: at 21:25

## 2022-01-01 RX ADMIN — ENOXAPARIN SODIUM 40 MG: 100 INJECTION SUBCUTANEOUS at 09:43

## 2022-01-01 RX ADMIN — INSULIN LISPRO 4 UNITS: 100 INJECTION, SOLUTION INTRAVENOUS; SUBCUTANEOUS at 00:11

## 2022-01-01 RX ADMIN — MINERAL SUPPLEMENT IRON 300 MG / 5 ML STRENGTH LIQUID 100 PER BOX UNFLAVORED 300 MG: at 16:21

## 2022-01-01 RX ADMIN — HYDROMORPHONE HYDROCHLORIDE 8 MG: 2 TABLET ORAL at 02:02

## 2022-01-01 RX ADMIN — Medication 2 TABLET: at 08:06

## 2022-01-01 RX ADMIN — Medication 1 SPRAY: at 22:21

## 2022-01-01 RX ADMIN — HYDRALAZINE HYDROCHLORIDE 12.5 MG: 25 TABLET, FILM COATED ORAL at 08:06

## 2022-01-01 RX ADMIN — Medication 1 SPRAY: at 22:20

## 2022-01-01 RX ADMIN — INSULIN LISPRO 6 UNITS: 100 INJECTION, SOLUTION INTRAVENOUS; SUBCUTANEOUS at 22:14

## 2022-01-01 RX ADMIN — ASPIRIN 81 MG: 81 TABLET, COATED ORAL at 10:18

## 2022-01-01 RX ADMIN — AMLODIPINE BESYLATE 5 MG: 5 TABLET ORAL at 16:15

## 2022-01-01 RX ADMIN — INSULIN GLARGINE 32 UNITS: 100 INJECTION, SOLUTION SUBCUTANEOUS at 08:59

## 2022-01-01 RX ADMIN — FERROUS SULFATE TAB 325 MG (65 MG ELEMENTAL FE) 325 MG: 325 (65 FE) TAB at 11:06

## 2022-01-01 RX ADMIN — INSULIN LISPRO 6 UNITS: 100 INJECTION, SOLUTION INTRAVENOUS; SUBCUTANEOUS at 02:29

## 2022-01-01 RX ADMIN — DEXTROSE MONOHYDRATE 100 ML/HR: 50 INJECTION, SOLUTION INTRAVENOUS at 00:30

## 2022-01-01 RX ADMIN — FERROUS SULFATE TAB 325 MG (65 MG ELEMENTAL FE) 325 MG: 325 (65 FE) TAB at 09:46

## 2022-01-01 RX ADMIN — DEXTROSE MONOHYDRATE 4 MCG/MIN: 50 INJECTION, SOLUTION INTRAVENOUS at 02:34

## 2022-01-01 RX ADMIN — FERROUS SULFATE TAB 325 MG (65 MG ELEMENTAL FE) 325 MG: 325 (65 FE) TAB at 22:05

## 2022-01-01 RX ADMIN — INSULIN LISPRO 9 UNITS: 100 INJECTION, SOLUTION INTRAVENOUS; SUBCUTANEOUS at 02:22

## 2022-01-01 RX ADMIN — BRIMONIDINE TARTRATE 1 DROP: 2 SOLUTION/ DROPS OPHTHALMIC at 10:12

## 2022-01-01 RX ADMIN — LORAZEPAM 1 MG: 2 INJECTION INTRAMUSCULAR at 16:11

## 2022-01-01 RX ADMIN — PIPERACILLIN SODIUM AND TAZOBACTAM SODIUM 3.38 G: 3; .375 INJECTION, POWDER, LYOPHILIZED, FOR SOLUTION INTRAVENOUS at 18:00

## 2022-01-01 RX ADMIN — INSULIN GLARGINE 50 UNITS: 100 INJECTION, SOLUTION SUBCUTANEOUS at 08:46

## 2022-01-01 RX ADMIN — HYDRALAZINE HYDROCHLORIDE 50 MG: 50 TABLET, FILM COATED ORAL at 10:23

## 2022-01-01 RX ADMIN — BRIMONIDINE TARTRATE 1 DROP: 2 SOLUTION/ DROPS OPHTHALMIC at 08:32

## 2022-01-01 RX ADMIN — PIPERACILLIN SODIUM AND TAZOBACTAM SODIUM 3.38 G: 3; .375 INJECTION, POWDER, LYOPHILIZED, FOR SOLUTION INTRAVENOUS at 18:17

## 2022-01-01 RX ADMIN — GLYCOPYRROLATE 0.1 MG: 0.2 INJECTION INTRAMUSCULAR; INTRAVENOUS at 21:59

## 2022-01-01 RX ADMIN — HEPARIN SODIUM 5000 UNITS: 5000 INJECTION INTRAVENOUS; SUBCUTANEOUS at 10:11

## 2022-01-01 RX ADMIN — HYDROMORPHONE HYDROCHLORIDE 1 MG: 1 INJECTION, SOLUTION INTRAMUSCULAR; INTRAVENOUS; SUBCUTANEOUS at 21:07

## 2022-01-01 RX ADMIN — BRIMONIDINE TARTRATE 1 DROP: 2 SOLUTION/ DROPS OPHTHALMIC at 21:17

## 2022-01-01 RX ADMIN — INSULIN LISPRO 3 UNITS: 100 INJECTION, SOLUTION INTRAVENOUS; SUBCUTANEOUS at 12:29

## 2022-01-01 RX ADMIN — ACETAMINOPHEN 650 MG: 650 SOLUTION ORAL at 12:09

## 2022-01-01 RX ADMIN — METOPROLOL SUCCINATE 50 MG: 50 TABLET, EXTENDED RELEASE ORAL at 09:06

## 2022-01-01 RX ADMIN — METOPROLOL TARTRATE 50 MG: 50 TABLET, FILM COATED ORAL at 20:33

## 2022-01-01 RX ADMIN — SODIUM CHLORIDE 3 G: 900 INJECTION INTRAVENOUS at 05:22

## 2022-01-01 RX ADMIN — LATANOPROST 1 DROP: 50 SOLUTION OPHTHALMIC at 17:29

## 2022-01-01 RX ADMIN — HYDRALAZINE HYDROCHLORIDE 12.5 MG: 25 TABLET, FILM COATED ORAL at 08:51

## 2022-01-01 RX ADMIN — SODIUM CHLORIDE, PRESERVATIVE FREE 10 ML: 5 INJECTION INTRAVENOUS at 22:24

## 2022-01-01 RX ADMIN — PROPOFOL 20 MCG/KG/MIN: 10 INJECTION, EMULSION INTRAVENOUS at 23:48

## 2022-01-01 RX ADMIN — BRIMONIDINE TARTRATE 1 DROP: 2 SOLUTION OPHTHALMIC at 21:17

## 2022-01-01 RX ADMIN — Medication 1 SPRAY: at 21:36

## 2022-01-01 RX ADMIN — INSULIN GLARGINE 32 UNITS: 100 INJECTION, SOLUTION SUBCUTANEOUS at 09:00

## 2022-01-01 RX ADMIN — FERROUS SULFATE TAB 325 MG (65 MG ELEMENTAL FE) 325 MG: 325 (65 FE) TAB at 20:46

## 2022-01-01 RX ADMIN — FLUCYTOSINE 500 MG: 500 CAPSULE ORAL at 19:07

## 2022-01-01 RX ADMIN — Medication 1 SPRAY: at 21:00

## 2022-01-01 RX ADMIN — HEPARIN SODIUM 5000 UNITS: 5000 INJECTION INTRAVENOUS; SUBCUTANEOUS at 15:19

## 2022-01-01 RX ADMIN — METOPROLOL SUCCINATE 25 MG: 25 TABLET, EXTENDED RELEASE ORAL at 08:06

## 2022-01-01 RX ADMIN — SODIUM CHLORIDE 3 G: 900 INJECTION INTRAVENOUS at 22:51

## 2022-01-01 RX ADMIN — SODIUM CHLORIDE 3 G: 900 INJECTION INTRAVENOUS at 05:46

## 2022-01-01 RX ADMIN — Medication 2 TABLET: at 08:41

## 2022-01-01 RX ADMIN — SACUBITRIL AND VALSARTAN 1 TABLET: 24; 26 TABLET, FILM COATED ORAL at 09:47

## 2022-01-01 RX ADMIN — BRIMONIDINE TARTRATE 1 DROP: 2 SOLUTION/ DROPS OPHTHALMIC at 22:12

## 2022-01-01 RX ADMIN — ASPIRIN 81 MG CHEWABLE TABLET 81 MG: 81 TABLET CHEWABLE at 10:24

## 2022-01-01 RX ADMIN — HYDROMORPHONE HYDROCHLORIDE 8 MG: 2 TABLET ORAL at 21:32

## 2022-01-01 RX ADMIN — INSULIN GLARGINE 32 UNITS: 100 INJECTION, SOLUTION SUBCUTANEOUS at 20:33

## 2022-01-01 RX ADMIN — BRIMONIDINE TARTRATE 1 DROP: 2 SOLUTION OPHTHALMIC at 17:26

## 2022-01-01 RX ADMIN — METOPROLOL TARTRATE 12.5 MG: 25 TABLET, FILM COATED ORAL at 21:15

## 2022-01-01 RX ADMIN — SODIUM CHLORIDE 3 G: 900 INJECTION INTRAVENOUS at 22:24

## 2022-01-01 RX ADMIN — PIPERACILLIN SODIUM AND TAZOBACTAM SODIUM 3.38 G: 3; .375 INJECTION, POWDER, LYOPHILIZED, FOR SOLUTION INTRAVENOUS at 10:15

## 2022-01-01 RX ADMIN — BRIMONIDINE TARTRATE 1 DROP: 2 SOLUTION/ DROPS OPHTHALMIC at 21:16

## 2022-01-01 RX ADMIN — HYDRALAZINE HYDROCHLORIDE 12.5 MG: 25 TABLET, FILM COATED ORAL at 15:55

## 2022-01-01 RX ADMIN — ASPIRIN 81 MG: 81 TABLET, COATED ORAL at 08:15

## 2022-01-01 RX ADMIN — HEPARIN SODIUM 5000 UNITS: 5000 INJECTION INTRAVENOUS; SUBCUTANEOUS at 09:25

## 2022-01-01 RX ADMIN — GLYCOPYRROLATE 0.1 MG: 0.2 INJECTION INTRAMUSCULAR; INTRAVENOUS at 10:05

## 2022-01-01 RX ADMIN — BRIMONIDINE TARTRATE 1 DROP: 2 SOLUTION/ DROPS OPHTHALMIC at 22:57

## 2022-01-01 RX ADMIN — PROPOFOL 20 MCG/KG/MIN: 10 INJECTION, EMULSION INTRAVENOUS at 12:00

## 2022-01-01 RX ADMIN — PHENYLEPHRINE HYDROCHLORIDE 200 MCG: 10 INJECTION INTRAVENOUS at 10:33

## 2022-01-01 RX ADMIN — HYDRALAZINE HYDROCHLORIDE 12.5 MG: 25 TABLET, FILM COATED ORAL at 17:43

## 2022-01-01 RX ADMIN — ACETAMINOPHEN 650 MG: 650 SOLUTION ORAL at 14:24

## 2022-01-01 RX ADMIN — FERROUS SULFATE TAB 325 MG (65 MG ELEMENTAL FE) 325 MG: 325 (65 FE) TAB at 11:32

## 2022-01-01 RX ADMIN — BRIMONIDINE TARTRATE 1 DROP: 2 SOLUTION OPHTHALMIC at 21:34

## 2022-01-01 RX ADMIN — HYOSCYAMINE SULFATE: 16 SOLUTION at 13:21

## 2022-01-01 RX ADMIN — Medication: at 17:26

## 2022-01-01 RX ADMIN — INSULIN LISPRO 6 UNITS: 100 INJECTION, SOLUTION INTRAVENOUS; SUBCUTANEOUS at 12:09

## 2022-01-01 RX ADMIN — SODIUM CHLORIDE 3 G: 900 INJECTION INTRAVENOUS at 00:56

## 2022-01-01 RX ADMIN — PIPERACILLIN AND TAZOBACTAM 3.38 G: 3; .375 INJECTION, POWDER, LYOPHILIZED, FOR SOLUTION INTRAVENOUS at 21:55

## 2022-01-01 RX ADMIN — GABAPENTIN 300 MG: 300 CAPSULE ORAL at 21:03

## 2022-01-01 RX ADMIN — SODIUM CHLORIDE 3 G: 900 INJECTION INTRAVENOUS at 21:59

## 2022-01-01 RX ADMIN — LATANOPROST 1 DROP: 50 SOLUTION OPHTHALMIC at 17:59

## 2022-01-01 RX ADMIN — Medication 1 SPRAY: at 22:03

## 2022-01-01 RX ADMIN — GLYCOPYRROLATE 0.4 MG: 0.2 INJECTION INTRAMUSCULAR; INTRAVENOUS at 18:23

## 2022-01-01 RX ADMIN — HYDRALAZINE HYDROCHLORIDE 20 MG: 20 INJECTION, SOLUTION INTRAMUSCULAR; INTRAVENOUS at 06:25

## 2022-01-01 RX ADMIN — PROPOFOL 25 MG: 10 INJECTION, EMULSION INTRAVENOUS at 14:31

## 2022-01-01 RX ADMIN — SODIUM CHLORIDE, PRESERVATIVE FREE 10 ML: 5 INJECTION INTRAVENOUS at 17:03

## 2022-01-01 RX ADMIN — HYDRALAZINE HYDROCHLORIDE 12.5 MG: 25 TABLET, FILM COATED ORAL at 17:56

## 2022-01-01 RX ADMIN — TRAMADOL HYDROCHLORIDE 25 MG: 50 TABLET, COATED ORAL at 10:42

## 2022-01-01 RX ADMIN — INSULIN LISPRO 9 UNITS: 100 INJECTION, SOLUTION INTRAVENOUS; SUBCUTANEOUS at 12:26

## 2022-01-01 RX ADMIN — INSULIN LISPRO 7 UNITS: 100 INJECTION, SOLUTION INTRAVENOUS; SUBCUTANEOUS at 23:26

## 2022-01-01 RX ADMIN — PROPOFOL 30 MCG/KG/MIN: 10 INJECTION, EMULSION INTRAVENOUS at 22:38

## 2022-01-01 RX ADMIN — SACUBITRIL AND VALSARTAN 1 TABLET: 24; 26 TABLET, FILM COATED ORAL at 21:15

## 2022-01-01 RX ADMIN — SODIUM CHLORIDE 3 G: 900 INJECTION INTRAVENOUS at 06:01

## 2022-01-01 RX ADMIN — METOPROLOL SUCCINATE 50 MG: 50 TABLET, EXTENDED RELEASE ORAL at 09:05

## 2022-01-01 RX ADMIN — LORAZEPAM 1 MG: 2 INJECTION INTRAMUSCULAR at 08:35

## 2022-01-01 RX ADMIN — METOPROLOL SUCCINATE 50 MG: 50 TABLET, EXTENDED RELEASE ORAL at 09:11

## 2022-01-01 RX ADMIN — PIPERACILLIN SODIUM AND TAZOBACTAM SODIUM 3.38 G: 3; .375 INJECTION, POWDER, LYOPHILIZED, FOR SOLUTION INTRAVENOUS at 11:05

## 2022-01-01 RX ADMIN — HEPARIN SODIUM 5000 UNITS: 5000 INJECTION INTRAVENOUS; SUBCUTANEOUS at 09:32

## 2022-01-01 RX ADMIN — BRIMONIDINE TARTRATE 1 DROP: 2 SOLUTION/ DROPS OPHTHALMIC at 08:57

## 2022-01-01 RX ADMIN — MEROPENEM 1 G: 1 INJECTION, POWDER, FOR SOLUTION INTRAVENOUS at 13:49

## 2022-01-01 RX ADMIN — FERROUS SULFATE TAB 325 MG (65 MG ELEMENTAL FE) 325 MG: 325 (65 FE) TAB at 22:28

## 2022-01-01 RX ADMIN — HYDRALAZINE HYDROCHLORIDE 50 MG: 50 TABLET, FILM COATED ORAL at 09:37

## 2022-01-01 RX ADMIN — VANCOMYCIN HYDROCHLORIDE 500 MG: 500 INJECTION, POWDER, LYOPHILIZED, FOR SOLUTION INTRAVENOUS at 18:50

## 2022-01-01 RX ADMIN — LORAZEPAM 1 MG: 2 INJECTION INTRAMUSCULAR at 21:04

## 2022-01-01 RX ADMIN — Medication 1 SPRAY: at 20:46

## 2022-01-01 RX ADMIN — FENOFIBRATE 48 MG: 48 TABLET ORAL at 10:06

## 2022-01-01 RX ADMIN — Medication 2 TABLET: at 10:02

## 2022-01-01 RX ADMIN — GABAPENTIN 300 MG: 300 CAPSULE ORAL at 08:59

## 2022-01-01 RX ADMIN — INSULIN GLARGINE 10 UNITS: 100 INJECTION, SOLUTION SUBCUTANEOUS at 22:11

## 2022-01-01 RX ADMIN — PIPERACILLIN AND TAZOBACTAM 3.38 G: 3; .375 INJECTION, POWDER, LYOPHILIZED, FOR SOLUTION INTRAVENOUS at 12:09

## 2022-01-01 RX ADMIN — METOPROLOL TARTRATE 12.5 MG: 25 TABLET, FILM COATED ORAL at 09:53

## 2022-01-01 RX ADMIN — GABAPENTIN 100 MG: 100 CAPSULE ORAL at 08:21

## 2022-01-01 RX ADMIN — HYDRALAZINE HYDROCHLORIDE 12.5 MG: 25 TABLET, FILM COATED ORAL at 17:16

## 2022-01-01 RX ADMIN — INSULIN HUMAN 24 UNITS: 100 INJECTION, SUSPENSION SUBCUTANEOUS at 23:25

## 2022-01-01 RX ADMIN — PIPERACILLIN SODIUM AND TAZOBACTAM SODIUM 3.38 G: 3; .375 INJECTION, POWDER, LYOPHILIZED, FOR SOLUTION INTRAVENOUS at 02:54

## 2022-01-01 RX ADMIN — LATANOPROST 1 DROP: 50 SOLUTION OPHTHALMIC at 18:19

## 2022-01-01 RX ADMIN — DEXTROSE MONOHYDRATE 30 ML/HR: 50 INJECTION, SOLUTION INTRAVENOUS at 00:58

## 2022-01-01 RX ADMIN — GABAPENTIN 300 MG: 300 CAPSULE ORAL at 09:53

## 2022-01-01 RX ADMIN — HEPARIN SODIUM 5000 UNITS: 5000 INJECTION INTRAVENOUS; SUBCUTANEOUS at 05:34

## 2022-01-01 RX ADMIN — LORAZEPAM 1 MG: 2 INJECTION INTRAMUSCULAR at 09:03

## 2022-01-01 RX ADMIN — FAMOTIDINE 20 MG: 20 TABLET ORAL at 08:48

## 2022-01-01 RX ADMIN — LEVETIRACETAM 1000 MG: 10 INJECTION, SOLUTION INTRAVENOUS at 03:20

## 2022-01-01 RX ADMIN — PIPERACILLIN SODIUM AND TAZOBACTAM SODIUM 3.38 G: 3; .375 INJECTION, POWDER, LYOPHILIZED, FOR SOLUTION INTRAVENOUS at 00:01

## 2022-01-01 RX ADMIN — BRIMONIDINE TARTRATE 1 DROP: 2 SOLUTION/ DROPS OPHTHALMIC at 16:00

## 2022-01-01 RX ADMIN — FAMOTIDINE 20 MG: 20 TABLET ORAL at 09:19

## 2022-01-01 RX ADMIN — Medication 2 TABLET: at 09:42

## 2022-01-01 RX ADMIN — LABETALOL HYDROCHLORIDE 20 MG: 5 INJECTION, SOLUTION INTRAVENOUS at 22:17

## 2022-01-01 RX ADMIN — BRIMONIDINE TARTRATE 1 DROP: 2 SOLUTION OPHTHALMIC at 08:29

## 2022-01-01 RX ADMIN — INSULIN LISPRO 9 UNITS: 100 INJECTION, SOLUTION INTRAVENOUS; SUBCUTANEOUS at 17:24

## 2022-01-01 RX ADMIN — BRIMONIDINE TARTRATE 1 DROP: 2 SOLUTION OPHTHALMIC at 21:02

## 2022-01-01 RX ADMIN — ACETAMINOPHEN ORAL SOLUTION 650 MG: 650 SOLUTION ORAL at 19:58

## 2022-01-01 RX ADMIN — SODIUM CHLORIDE 500 ML: 9 INJECTION, SOLUTION INTRAVENOUS at 03:32

## 2022-01-01 RX ADMIN — INSULIN LISPRO 15 UNITS: 100 INJECTION, SOLUTION INTRAVENOUS; SUBCUTANEOUS at 20:45

## 2022-01-01 RX ADMIN — ENOXAPARIN SODIUM 40 MG: 100 INJECTION SUBCUTANEOUS at 09:14

## 2022-01-01 RX ADMIN — METHYLPREDNISOLONE SODIUM SUCCINATE 40 MG: 40 INJECTION, POWDER, FOR SOLUTION INTRAMUSCULAR; INTRAVENOUS at 08:15

## 2022-01-01 RX ADMIN — HYDRALAZINE HYDROCHLORIDE 12.5 MG: 25 TABLET, FILM COATED ORAL at 08:32

## 2022-01-01 RX ADMIN — FERROUS SULFATE TAB 325 MG (65 MG ELEMENTAL FE) 325 MG: 325 (65 FE) TAB at 09:18

## 2022-01-01 RX ADMIN — HYOSCYAMINE SULFATE: 16 SOLUTION at 22:14

## 2022-01-01 RX ADMIN — SODIUM CHLORIDE 3 G: 900 INJECTION INTRAVENOUS at 05:50

## 2022-01-01 RX ADMIN — ASPIRIN 81 MG: 81 TABLET, COATED ORAL at 08:36

## 2022-01-01 RX ADMIN — SACUBITRIL AND VALSARTAN 1 TABLET: 24; 26 TABLET, FILM COATED ORAL at 22:17

## 2022-01-01 RX ADMIN — BRIMONIDINE TARTRATE 1 DROP: 2 SOLUTION OPHTHALMIC at 10:35

## 2022-01-01 RX ADMIN — METOPROLOL TARTRATE 50 MG: 50 TABLET, FILM COATED ORAL at 10:11

## 2022-01-01 RX ADMIN — GABAPENTIN 300 MG: 300 CAPSULE ORAL at 08:18

## 2022-01-01 RX ADMIN — CEFTRIAXONE SODIUM 1 G: 1 INJECTION, POWDER, FOR SOLUTION INTRAMUSCULAR; INTRAVENOUS at 17:18

## 2022-01-01 RX ADMIN — INSULIN LISPRO 12 UNITS: 100 INJECTION, SOLUTION INTRAVENOUS; SUBCUTANEOUS at 11:47

## 2022-01-01 RX ADMIN — CIPROFLOXACIN 500 MG: 500 TABLET, FILM COATED ORAL at 17:19

## 2022-01-01 RX ADMIN — EPHEDRINE SULFATE 1500 MCG: 50 INJECTION INTRAVENOUS at 10:35

## 2022-01-01 RX ADMIN — ASPIRIN 81 MG CHEWABLE TABLET 81 MG: 81 TABLET CHEWABLE at 10:05

## 2022-01-01 RX ADMIN — PHYTONADIONE 10 MG: 5 TABLET ORAL at 08:25

## 2022-01-01 RX ADMIN — HYDRALAZINE HYDROCHLORIDE 12.5 MG: 25 TABLET, FILM COATED ORAL at 10:23

## 2022-01-01 RX ADMIN — CALCIUM POLYCARBOPHIL 625 MG: 625 TABLET, FILM COATED ORAL at 10:05

## 2022-01-01 RX ADMIN — GABAPENTIN 300 MG: 300 CAPSULE ORAL at 20:39

## 2022-01-01 RX ADMIN — INSULIN LISPRO 10 UNITS: 100 INJECTION, SOLUTION INTRAVENOUS; SUBCUTANEOUS at 17:09

## 2022-01-01 RX ADMIN — LOPERAMIDE HYDROCHLORIDE 2 MG: 2 CAPSULE ORAL at 13:31

## 2022-01-01 RX ADMIN — INSULIN LISPRO 6 UNITS: 100 INJECTION, SOLUTION INTRAVENOUS; SUBCUTANEOUS at 00:59

## 2022-01-01 RX ADMIN — LATANOPROST 1 DROP: 50 SOLUTION OPHTHALMIC at 21:24

## 2022-01-01 RX ADMIN — HYDROMORPHONE HYDROCHLORIDE 1 MG: 1 INJECTION, SOLUTION INTRAMUSCULAR; INTRAVENOUS; SUBCUTANEOUS at 16:10

## 2022-01-01 RX ADMIN — AMLODIPINE BESYLATE 5 MG: 5 TABLET ORAL at 10:24

## 2022-01-01 RX ADMIN — SODIUM CHLORIDE, PRESERVATIVE FREE 10 ML: 5 INJECTION INTRAVENOUS at 05:48

## 2022-01-01 RX ADMIN — AMLODIPINE BESYLATE 5 MG: 5 TABLET ORAL at 08:41

## 2022-01-01 RX ADMIN — GLYCOPYRROLATE 0.4 MG: 0.2 INJECTION INTRAMUSCULAR; INTRAVENOUS at 17:40

## 2022-01-01 RX ADMIN — Medication 2 TABLET: at 11:11

## 2022-01-01 RX ADMIN — VANCOMYCIN HYDROCHLORIDE 500 MG: 500 INJECTION, POWDER, LYOPHILIZED, FOR SOLUTION INTRAVENOUS at 12:33

## 2022-01-01 RX ADMIN — FUROSEMIDE 40 MG: 10 INJECTION, SOLUTION INTRAMUSCULAR; INTRAVENOUS at 10:05

## 2022-01-01 RX ADMIN — INSULIN LISPRO 3 UNITS: 100 INJECTION, SOLUTION INTRAVENOUS; SUBCUTANEOUS at 00:50

## 2022-01-01 RX ADMIN — Medication 1 MG: at 21:32

## 2022-01-01 RX ADMIN — CEFTRIAXONE SODIUM 1 G: 1 INJECTION, POWDER, FOR SOLUTION INTRAMUSCULAR; INTRAVENOUS at 18:15

## 2022-01-01 RX ADMIN — INSULIN LISPRO 2 UNITS: 100 INJECTION, SOLUTION INTRAVENOUS; SUBCUTANEOUS at 06:35

## 2022-01-01 RX ADMIN — GABAPENTIN 300 MG: 300 CAPSULE ORAL at 20:20

## 2022-01-01 RX ADMIN — HYDRALAZINE HYDROCHLORIDE 50 MG: 50 TABLET, FILM COATED ORAL at 00:30

## 2022-01-01 RX ADMIN — Medication 1 SPRAY: at 14:17

## 2022-01-01 RX ADMIN — RIVAROXABAN 2.5 MG: 2.5 TABLET, FILM COATED ORAL at 17:20

## 2022-01-01 RX ADMIN — GABAPENTIN 300 MG: 300 CAPSULE ORAL at 10:11

## 2022-01-01 RX ADMIN — METOPROLOL TARTRATE 12.5 MG: 25 TABLET, FILM COATED ORAL at 10:05

## 2022-01-01 RX ADMIN — SACUBITRIL AND VALSARTAN 1 TABLET: 24; 26 TABLET, FILM COATED ORAL at 08:51

## 2022-01-01 RX ADMIN — ENOXAPARIN SODIUM 40 MG: 100 INJECTION SUBCUTANEOUS at 09:48

## 2022-01-01 RX ADMIN — CEFTAZIDIME 1 G: 1 INJECTION, POWDER, FOR SOLUTION INTRAMUSCULAR; INTRAVENOUS at 10:19

## 2022-01-01 RX ADMIN — Medication 1 SPRAY: at 10:33

## 2022-01-01 RX ADMIN — FERROUS SULFATE TAB 325 MG (65 MG ELEMENTAL FE) 325 MG: 325 (65 FE) TAB at 22:33

## 2022-01-01 RX ADMIN — SODIUM CHLORIDE, PRESERVATIVE FREE 10 ML: 5 INJECTION INTRAVENOUS at 13:50

## 2022-01-01 RX ADMIN — PIPERACILLIN AND TAZOBACTAM 3.38 G: 3; .375 INJECTION, POWDER, LYOPHILIZED, FOR SOLUTION INTRAVENOUS at 20:54

## 2022-01-01 RX ADMIN — HEPARIN SODIUM 5000 UNITS: 5000 INJECTION INTRAVENOUS; SUBCUTANEOUS at 10:42

## 2022-01-01 RX ADMIN — BRIMONIDINE TARTRATE 1 DROP: 2 SOLUTION OPHTHALMIC at 23:26

## 2022-01-01 RX ADMIN — METOPROLOL TARTRATE 12.5 MG: 25 TABLET, FILM COATED ORAL at 08:48

## 2022-01-01 RX ADMIN — FLUCONAZOLE 200 MG: 100 TABLET ORAL at 10:26

## 2022-01-01 RX ADMIN — Medication 1.5 MCG/MIN: at 10:45

## 2022-01-01 RX ADMIN — INSULIN GLARGINE 50 UNITS: 100 INJECTION, SOLUTION SUBCUTANEOUS at 09:14

## 2022-01-01 RX ADMIN — HYDROMORPHONE HYDROCHLORIDE 1 MG: 1 INJECTION, SOLUTION INTRAMUSCULAR; INTRAVENOUS; SUBCUTANEOUS at 17:11

## 2022-01-01 RX ADMIN — INSULIN LISPRO 1 UNITS: 100 INJECTION, SOLUTION INTRAVENOUS; SUBCUTANEOUS at 11:54

## 2022-01-01 RX ADMIN — AMLODIPINE BESYLATE 10 MG: 5 TABLET ORAL at 08:21

## 2022-01-01 RX ADMIN — INSULIN GLARGINE 50 UNITS: 100 INJECTION, SOLUTION SUBCUTANEOUS at 08:40

## 2022-01-01 RX ADMIN — FERROUS SULFATE TAB 325 MG (65 MG ELEMENTAL FE) 325 MG: 325 (65 FE) TAB at 08:31

## 2022-01-01 RX ADMIN — INSULIN LISPRO 9 UNITS: 100 INJECTION, SOLUTION INTRAVENOUS; SUBCUTANEOUS at 12:51

## 2022-01-01 RX ADMIN — INSULIN LISPRO 10 UNITS: 100 INJECTION, SOLUTION INTRAVENOUS; SUBCUTANEOUS at 06:10

## 2022-01-01 RX ADMIN — INSULIN GLARGINE 32 UNITS: 100 INJECTION, SOLUTION SUBCUTANEOUS at 08:51

## 2022-01-01 RX ADMIN — GLYCOPYRROLATE 0.1 MG: 0.2 INJECTION INTRAMUSCULAR; INTRAVENOUS at 22:53

## 2022-01-01 RX ADMIN — INSULIN GLARGINE 10 UNITS: 100 INJECTION, SOLUTION SUBCUTANEOUS at 00:11

## 2022-01-01 RX ADMIN — GLYCOPYRROLATE 0.4 MG: 0.2 INJECTION INTRAMUSCULAR; INTRAVENOUS at 03:12

## 2022-01-01 RX ADMIN — SODIUM CHLORIDE 3 G: 900 INJECTION INTRAVENOUS at 21:06

## 2022-01-01 RX ADMIN — HYDRALAZINE HYDROCHLORIDE 20 MG: 20 INJECTION, SOLUTION INTRAMUSCULAR; INTRAVENOUS at 22:43

## 2022-01-01 RX ADMIN — FERROUS SULFATE TAB 325 MG (65 MG ELEMENTAL FE) 325 MG: 325 (65 FE) TAB at 21:04

## 2022-01-01 RX ADMIN — BRIMONIDINE TARTRATE 1 DROP: 2 SOLUTION/ DROPS OPHTHALMIC at 09:00

## 2022-01-01 RX ADMIN — INSULIN GLARGINE 50 UNITS: 100 INJECTION, SOLUTION SUBCUTANEOUS at 08:18

## 2022-01-01 RX ADMIN — Medication 1 MG: at 02:02

## 2022-01-01 RX ADMIN — INSULIN HUMAN 24 UNITS: 100 INJECTION, SUSPENSION SUBCUTANEOUS at 18:25

## 2022-01-01 RX ADMIN — HEPARIN SODIUM 5000 UNITS: 5000 INJECTION INTRAVENOUS; SUBCUTANEOUS at 05:42

## 2022-01-01 RX ADMIN — FERROUS SULFATE TAB 325 MG (65 MG ELEMENTAL FE) 325 MG: 325 (65 FE) TAB at 21:46

## 2022-01-01 RX ADMIN — HYDRALAZINE HYDROCHLORIDE 12.5 MG: 25 TABLET, FILM COATED ORAL at 15:10

## 2022-01-01 RX ADMIN — LABETALOL HYDROCHLORIDE 20 MG: 5 INJECTION, SOLUTION INTRAVENOUS at 17:31

## 2022-01-01 RX ADMIN — ATORVASTATIN CALCIUM 80 MG: 40 TABLET, FILM COATED ORAL at 11:32

## 2022-01-01 RX ADMIN — SODIUM CHLORIDE, PRESERVATIVE FREE 10 ML: 5 INJECTION INTRAVENOUS at 05:15

## 2022-01-01 RX ADMIN — FLUCONAZOLE 200 MG: 2 INJECTION, SOLUTION INTRAVENOUS at 19:58

## 2022-01-01 RX ADMIN — METOPROLOL SUCCINATE 50 MG: 50 TABLET, EXTENDED RELEASE ORAL at 10:02

## 2022-01-01 RX ADMIN — MEROPENEM 1 G: 1 INJECTION, POWDER, FOR SOLUTION INTRAVENOUS at 16:56

## 2022-01-01 RX ADMIN — PROPOFOL 35 MCG/KG/MIN: 10 INJECTION, EMULSION INTRAVENOUS at 10:05

## 2022-01-01 RX ADMIN — METOPROLOL TARTRATE 50 MG: 50 TABLET, FILM COATED ORAL at 22:06

## 2022-01-01 RX ADMIN — SODIUM CHLORIDE 3 G: 900 INJECTION INTRAVENOUS at 05:56

## 2022-01-01 RX ADMIN — ATORVASTATIN CALCIUM 80 MG: 40 TABLET, FILM COATED ORAL at 09:00

## 2022-01-01 RX ADMIN — FERROUS SULFATE TAB 325 MG (65 MG ELEMENTAL FE) 325 MG: 325 (65 FE) TAB at 20:47

## 2022-01-01 RX ADMIN — BARIUM SULFATE 15 ML: 0.81 POWDER, FOR SUSPENSION ORAL at 10:00

## 2022-01-01 RX ADMIN — AMLODIPINE BESYLATE 5 MG: 5 TABLET ORAL at 11:11

## 2022-01-01 RX ADMIN — ATORVASTATIN CALCIUM 20 MG: 20 TABLET, FILM COATED ORAL at 21:12

## 2022-01-01 RX ADMIN — ASPIRIN 81 MG CHEWABLE TABLET 81 MG: 81 TABLET CHEWABLE at 09:17

## 2022-01-01 RX ADMIN — PHENYLEPHRINE HYDROCHLORIDE 200 MCG: 10 INJECTION INTRAVENOUS at 10:35

## 2022-01-01 RX ADMIN — INSULIN LISPRO 10 UNITS: 100 INJECTION, SOLUTION INTRAVENOUS; SUBCUTANEOUS at 18:25

## 2022-01-01 RX ADMIN — LABETALOL HYDROCHLORIDE 20 MG: 5 INJECTION, SOLUTION INTRAVENOUS at 17:14

## 2022-01-01 RX ADMIN — INSULIN LISPRO 1 UNITS: 100 INJECTION, SOLUTION INTRAVENOUS; SUBCUTANEOUS at 12:45

## 2022-01-01 RX ADMIN — MINERAL SUPPLEMENT IRON 300 MG / 5 ML STRENGTH LIQUID 100 PER BOX UNFLAVORED 300 MG: at 10:13

## 2022-01-01 RX ADMIN — MINERAL SUPPLEMENT IRON 300 MG / 5 ML STRENGTH LIQUID 100 PER BOX UNFLAVORED 300 MG: at 08:02

## 2022-01-01 RX ADMIN — CHOLESTYRAMINE 4 G: 4 POWDER, FOR SUSPENSION ORAL at 08:55

## 2022-01-01 RX ADMIN — Medication 1 SPRAY: at 09:47

## 2022-01-01 RX ADMIN — FLUCYTOSINE 500 MG: 500 CAPSULE ORAL at 23:53

## 2022-01-01 RX ADMIN — ASPIRIN 81 MG CHEWABLE TABLET 81 MG: 81 TABLET CHEWABLE at 11:53

## 2022-01-01 RX ADMIN — HEPARIN SODIUM 5000 UNITS: 5000 INJECTION INTRAVENOUS; SUBCUTANEOUS at 15:50

## 2022-01-01 RX ADMIN — SACUBITRIL AND VALSARTAN 1 TABLET: 24; 26 TABLET, FILM COATED ORAL at 09:53

## 2022-01-01 RX ADMIN — AMLODIPINE BESYLATE 10 MG: 5 TABLET ORAL at 09:05

## 2022-01-01 RX ADMIN — CEFTAZIDIME, AVIBACTAM 2.5 G: 2; .5 POWDER, FOR SOLUTION INTRAVENOUS at 13:33

## 2022-01-01 RX ADMIN — BRIMONIDINE TARTRATE 1 DROP: 2 SOLUTION OPHTHALMIC at 08:00

## 2022-01-01 RX ADMIN — SACUBITRIL AND VALSARTAN 1 TABLET: 24; 26 TABLET, FILM COATED ORAL at 21:00

## 2022-01-01 RX ADMIN — Medication 1 SPRAY: at 21:28

## 2022-01-01 RX ADMIN — INSULIN LISPRO 3 UNITS: 100 INJECTION, SOLUTION INTRAVENOUS; SUBCUTANEOUS at 18:50

## 2022-01-01 RX ADMIN — LATANOPROST 1 DROP: 50 SOLUTION OPHTHALMIC at 17:51

## 2022-01-01 RX ADMIN — MEROPENEM 1 G: 1 INJECTION, POWDER, FOR SOLUTION INTRAVENOUS at 01:03

## 2022-01-01 RX ADMIN — LINEZOLID 600 MG: 600 TABLET, FILM COATED ORAL at 09:04

## 2022-01-01 RX ADMIN — MEROPENEM 1 G: 1 INJECTION, POWDER, FOR SOLUTION INTRAVENOUS at 00:33

## 2022-01-01 RX ADMIN — BRIMONIDINE TARTRATE 1 DROP: 2 SOLUTION/ DROPS OPHTHALMIC at 09:13

## 2022-01-01 RX ADMIN — SODIUM CHLORIDE, PRESERVATIVE FREE 10 ML: 5 INJECTION INTRAVENOUS at 04:35

## 2022-01-01 RX ADMIN — SODIUM CHLORIDE, PRESERVATIVE FREE 10 ML: 5 INJECTION INTRAVENOUS at 06:13

## 2022-01-01 RX ADMIN — HEPARIN SODIUM 5000 UNITS: 5000 INJECTION INTRAVENOUS; SUBCUTANEOUS at 14:30

## 2022-01-01 RX ADMIN — GLYCOPYRROLATE 0.1 MG: 0.2 INJECTION INTRAMUSCULAR; INTRAVENOUS at 15:39

## 2022-01-01 RX ADMIN — NITROGLYCERIN 1 INCH: 20 OINTMENT TOPICAL at 12:53

## 2022-01-01 RX ADMIN — FUROSEMIDE 40 MG: 10 INJECTION, SOLUTION INTRAMUSCULAR; INTRAVENOUS at 08:00

## 2022-01-01 RX ADMIN — Medication 2 TABLET: at 08:17

## 2022-01-01 RX ADMIN — BRIMONIDINE TARTRATE 1 DROP: 2 SOLUTION/ DROPS OPHTHALMIC at 08:58

## 2022-01-01 RX ADMIN — INSULIN LISPRO 4 UNITS: 100 INJECTION, SOLUTION INTRAVENOUS; SUBCUTANEOUS at 13:49

## 2022-01-01 RX ADMIN — SODIUM CHLORIDE, PRESERVATIVE FREE 10 ML: 5 INJECTION INTRAVENOUS at 06:34

## 2022-01-01 RX ADMIN — GABAPENTIN 300 MG: 300 CAPSULE ORAL at 09:19

## 2022-01-01 RX ADMIN — POTASSIUM CHLORIDE 20 MEQ: 1.5 POWDER, FOR SOLUTION ORAL at 14:02

## 2022-01-01 RX ADMIN — MEROPENEM 1 G: 1 INJECTION, POWDER, FOR SOLUTION INTRAVENOUS at 08:16

## 2022-01-01 RX ADMIN — HYDRALAZINE HYDROCHLORIDE 12.5 MG: 25 TABLET, FILM COATED ORAL at 21:32

## 2022-01-01 RX ADMIN — BRIMONIDINE TARTRATE 1 DROP: 2 SOLUTION OPHTHALMIC at 22:55

## 2022-01-01 RX ADMIN — HYDROMORPHONE HYDROCHLORIDE 1 MG: 1 INJECTION, SOLUTION INTRAMUSCULAR; INTRAVENOUS; SUBCUTANEOUS at 21:04

## 2022-01-01 RX ADMIN — FLUCONAZOLE 200 MG: 200 INJECTION, SOLUTION INTRAVENOUS at 10:24

## 2022-01-01 RX ADMIN — INSULIN GLARGINE 32 UNITS: 100 INJECTION, SOLUTION SUBCUTANEOUS at 09:58

## 2022-01-01 RX ADMIN — INSULIN LISPRO 6 UNITS: 100 INJECTION, SOLUTION INTRAVENOUS; SUBCUTANEOUS at 04:11

## 2022-01-01 RX ADMIN — SACUBITRIL AND VALSARTAN 1 TABLET: 24; 26 TABLET, FILM COATED ORAL at 22:10

## 2022-01-01 RX ADMIN — ASPIRIN 81 MG: 81 TABLET, COATED ORAL at 08:47

## 2022-01-01 RX ADMIN — METOPROLOL TARTRATE 50 MG: 50 TABLET, FILM COATED ORAL at 08:32

## 2022-01-01 RX ADMIN — INSULIN LISPRO 6 UNITS: 100 INJECTION, SOLUTION INTRAVENOUS; SUBCUTANEOUS at 17:15

## 2022-01-01 RX ADMIN — VANCOMYCIN HYDROCHLORIDE 500 MG: 500 INJECTION, POWDER, LYOPHILIZED, FOR SOLUTION INTRAVENOUS at 12:56

## 2022-01-01 RX ADMIN — INSULIN LISPRO 2 UNITS: 100 INJECTION, SOLUTION INTRAVENOUS; SUBCUTANEOUS at 18:42

## 2022-01-01 RX ADMIN — PSYLLIUM HUSK 1 PACKET: 3.4 POWDER ORAL at 09:19

## 2022-01-01 RX ADMIN — IPRATROPIUM BROMIDE AND ALBUTEROL SULFATE 3 ML: .5; 3 SOLUTION RESPIRATORY (INHALATION) at 14:37

## 2022-01-01 RX ADMIN — PROPOFOL 25 MCG/KG/MIN: 10 INJECTION, EMULSION INTRAVENOUS at 22:21

## 2022-01-01 RX ADMIN — BRIMONIDINE TARTRATE 1 DROP: 2 SOLUTION OPHTHALMIC at 09:08

## 2022-01-01 RX ADMIN — PROPOFOL 20 MCG/KG/MIN: 10 INJECTION, EMULSION INTRAVENOUS at 00:59

## 2022-01-01 RX ADMIN — Medication 1 SPRAY: at 08:58

## 2022-01-01 RX ADMIN — GABAPENTIN 300 MG: 300 CAPSULE ORAL at 09:17

## 2022-01-01 RX ADMIN — Medication 2 TABLET: at 09:20

## 2022-01-01 RX ADMIN — FERROUS SULFATE TAB 325 MG (65 MG ELEMENTAL FE) 325 MG: 325 (65 FE) TAB at 21:15

## 2022-01-01 RX ADMIN — ROCURONIUM BROMIDE 20 MG: 50 INJECTION, SOLUTION INTRAVENOUS at 18:09

## 2022-01-01 RX ADMIN — BRIMONIDINE TARTRATE 1 DROP: 2 SOLUTION OPHTHALMIC at 16:14

## 2022-01-01 RX ADMIN — MINERAL SUPPLEMENT IRON 300 MG / 5 ML STRENGTH LIQUID 100 PER BOX UNFLAVORED 300 MG: at 21:53

## 2022-01-01 RX ADMIN — WHITE PETROLATUM,ZINC OXIDE: 20; 75 PASTE TOPICAL at 22:06

## 2022-01-01 RX ADMIN — PROPOFOL 20 MCG/KG/MIN: 10 INJECTION, EMULSION INTRAVENOUS at 06:43

## 2022-01-01 RX ADMIN — FLUCONAZOLE 200 MG: 200 INJECTION, SOLUTION INTRAVENOUS at 09:16

## 2022-01-01 RX ADMIN — GLYCOPYRROLATE 0.4 MG: 0.2 INJECTION INTRAMUSCULAR; INTRAVENOUS at 15:38

## 2022-01-01 RX ADMIN — INSULIN GLARGINE 50 UNITS: 100 INJECTION, SOLUTION SUBCUTANEOUS at 08:01

## 2022-01-01 RX ADMIN — CHOLESTYRAMINE 4 G: 4 POWDER, FOR SUSPENSION ORAL at 09:54

## 2022-01-01 RX ADMIN — HYDRALAZINE HYDROCHLORIDE 20 MG: 20 INJECTION, SOLUTION INTRAMUSCULAR; INTRAVENOUS at 11:57

## 2022-01-01 RX ADMIN — CEFTOLOZANE AND TAZOBACTAM 3 G: 1; .5 INJECTION, POWDER, LYOPHILIZED, FOR SOLUTION INTRAVENOUS at 23:36

## 2022-01-01 RX ADMIN — Medication 1 SPRAY: at 16:12

## 2022-01-01 RX ADMIN — BRIMONIDINE TARTRATE 1 DROP: 2 SOLUTION OPHTHALMIC at 20:40

## 2022-01-01 RX ADMIN — BRIMONIDINE TARTRATE 1 DROP: 2 SOLUTION OPHTHALMIC at 10:37

## 2022-01-01 RX ADMIN — BRIMONIDINE TARTRATE 1 DROP: 2 SOLUTION/ DROPS OPHTHALMIC at 08:06

## 2022-01-01 RX ADMIN — CEFTAZIDIME, AVIBACTAM 2.5 G: 2; .5 POWDER, FOR SOLUTION INTRAVENOUS at 06:07

## 2022-01-01 RX ADMIN — GABAPENTIN 300 MG: 300 CAPSULE ORAL at 08:03

## 2022-01-01 RX ADMIN — SACUBITRIL AND VALSARTAN 1 TABLET: 24; 26 TABLET, FILM COATED ORAL at 09:19

## 2022-01-01 RX ADMIN — BRIMONIDINE TARTRATE 1 DROP: 2 SOLUTION OPHTHALMIC at 21:30

## 2022-01-01 RX ADMIN — INSULIN GLARGINE 50 UNITS: 100 INJECTION, SOLUTION SUBCUTANEOUS at 09:56

## 2022-01-01 RX ADMIN — GLYCOPYRROLATE 0.4 MG: 0.2 INJECTION INTRAMUSCULAR; INTRAVENOUS at 00:24

## 2022-01-01 RX ADMIN — METOPROLOL SUCCINATE 50 MG: 50 TABLET, EXTENDED RELEASE ORAL at 10:33

## 2022-01-01 RX ADMIN — INSULIN GLARGINE 32 UNITS: 100 INJECTION, SOLUTION SUBCUTANEOUS at 21:03

## 2022-01-01 RX ADMIN — BRIMONIDINE TARTRATE 1 DROP: 2 SOLUTION/ DROPS OPHTHALMIC at 17:38

## 2022-01-01 RX ADMIN — PIPERACILLIN SODIUM AND TAZOBACTAM SODIUM 3.38 G: 3; .375 INJECTION, POWDER, LYOPHILIZED, FOR SOLUTION INTRAVENOUS at 00:24

## 2022-01-01 RX ADMIN — INSULIN LISPRO 6 UNITS: 100 INJECTION, SOLUTION INTRAVENOUS; SUBCUTANEOUS at 18:29

## 2022-01-01 RX ADMIN — HYDRALAZINE HYDROCHLORIDE 50 MG: 50 TABLET, FILM COATED ORAL at 17:19

## 2022-01-01 RX ADMIN — PIPERACILLIN AND TAZOBACTAM 3.38 G: 3; .375 INJECTION, POWDER, LYOPHILIZED, FOR SOLUTION INTRAVENOUS at 20:28

## 2022-01-01 RX ADMIN — TRAMADOL HYDROCHLORIDE 50 MG: 50 TABLET ORAL at 23:49

## 2022-01-01 RX ADMIN — SODIUM CHLORIDE, PRESERVATIVE FREE 10 ML: 5 INJECTION INTRAVENOUS at 22:07

## 2022-01-01 RX ADMIN — Medication 1 SPRAY: at 16:54

## 2022-01-01 RX ADMIN — ATORVASTATIN CALCIUM 20 MG: 20 TABLET, FILM COATED ORAL at 01:02

## 2022-01-01 RX ADMIN — SACUBITRIL AND VALSARTAN 1 TABLET: 24; 26 TABLET, FILM COATED ORAL at 08:03

## 2022-01-01 RX ADMIN — ENOXAPARIN SODIUM 40 MG: 100 INJECTION SUBCUTANEOUS at 10:27

## 2022-01-01 RX ADMIN — Medication 1 SPRAY: at 09:26

## 2022-01-01 RX ADMIN — GLYCOPYRROLATE 0.1 MG: 0.2 INJECTION INTRAMUSCULAR; INTRAVENOUS at 08:50

## 2022-01-01 RX ADMIN — INSULIN LISPRO 10 UNITS: 100 INJECTION, SOLUTION INTRAVENOUS; SUBCUTANEOUS at 12:47

## 2022-01-01 RX ADMIN — NITROGLYCERIN 1 INCH: 20 OINTMENT TOPICAL at 06:36

## 2022-01-01 RX ADMIN — SODIUM CHLORIDE 3 G: 900 INJECTION INTRAVENOUS at 15:16

## 2022-01-01 RX ADMIN — INSULIN LISPRO 6 UNITS: 100 INJECTION, SOLUTION INTRAVENOUS; SUBCUTANEOUS at 22:36

## 2022-01-01 RX ADMIN — Medication 1 SPRAY: at 20:48

## 2022-01-01 RX ADMIN — SODIUM CHLORIDE, SODIUM LACTATE, POTASSIUM CHLORIDE, CALCIUM CHLORIDE: 600; 310; 30; 20 INJECTION, SOLUTION INTRAVENOUS at 18:11

## 2022-01-01 RX ADMIN — LORAZEPAM 1 MG: 2 INJECTION INTRAMUSCULAR at 17:21

## 2022-01-01 RX ADMIN — HYDROMORPHONE HYDROCHLORIDE 1 MG: 1 INJECTION, SOLUTION INTRAMUSCULAR; INTRAVENOUS; SUBCUTANEOUS at 20:15

## 2022-01-01 RX ADMIN — ATORVASTATIN CALCIUM 80 MG: 40 TABLET, FILM COATED ORAL at 09:10

## 2022-01-01 RX ADMIN — VANCOMYCIN HYDROCHLORIDE 500 MG: 500 INJECTION, POWDER, LYOPHILIZED, FOR SOLUTION INTRAVENOUS at 09:55

## 2022-01-01 RX ADMIN — METOPROLOL TARTRATE 50 MG: 50 TABLET, FILM COATED ORAL at 22:58

## 2022-01-01 RX ADMIN — HYOSCYAMINE SULFATE: 16 SOLUTION at 08:51

## 2022-01-01 RX ADMIN — LORAZEPAM 1 MG: 2 INJECTION INTRAMUSCULAR at 23:44

## 2022-01-01 RX ADMIN — METOPROLOL TARTRATE 50 MG: 50 TABLET, FILM COATED ORAL at 09:37

## 2022-01-01 RX ADMIN — SODIUM CHLORIDE 3 G: 900 INJECTION INTRAVENOUS at 14:26

## 2022-01-01 RX ADMIN — SODIUM CHLORIDE 3 G: 900 INJECTION INTRAVENOUS at 06:13

## 2022-01-01 RX ADMIN — INSULIN LISPRO 1 UNITS: 100 INJECTION, SOLUTION INTRAVENOUS; SUBCUTANEOUS at 12:37

## 2022-01-01 RX ADMIN — HYDRALAZINE HYDROCHLORIDE 50 MG: 50 TABLET, FILM COATED ORAL at 18:57

## 2022-01-01 RX ADMIN — ASPIRIN 81 MG CHEWABLE TABLET 81 MG: 81 TABLET CHEWABLE at 10:54

## 2022-01-01 RX ADMIN — INSULIN GLARGINE 15 UNITS: 100 INJECTION, SOLUTION SUBCUTANEOUS at 21:26

## 2022-01-01 RX ADMIN — FAMOTIDINE 20 MG: 20 TABLET ORAL at 20:30

## 2022-01-01 RX ADMIN — MINERAL SUPPLEMENT IRON 300 MG / 5 ML STRENGTH LIQUID 100 PER BOX UNFLAVORED 300 MG: at 17:17

## 2022-01-01 RX ADMIN — FAMOTIDINE 20 MG: 20 TABLET ORAL at 09:52

## 2022-01-01 RX ADMIN — GLYCOPYRROLATE 0.4 MG: 0.2 INJECTION INTRAMUSCULAR; INTRAVENOUS at 12:19

## 2022-01-01 RX ADMIN — LORAZEPAM 1 MG: 2 INJECTION INTRAMUSCULAR at 05:29

## 2022-01-01 RX ADMIN — INSULIN LISPRO 4 UNITS: 100 INJECTION, SOLUTION INTRAVENOUS; SUBCUTANEOUS at 06:14

## 2022-01-01 RX ADMIN — LORAZEPAM 1 MG: 2 INJECTION INTRAMUSCULAR at 05:30

## 2022-01-01 RX ADMIN — SACUBITRIL AND VALSARTAN 1 TABLET: 24; 26 TABLET, FILM COATED ORAL at 10:30

## 2022-01-01 RX ADMIN — SODIUM CHLORIDE, PRESERVATIVE FREE 10 ML: 5 INJECTION INTRAVENOUS at 20:54

## 2022-01-01 RX ADMIN — GLYCOPYRROLATE 0.4 MG: 0.2 INJECTION INTRAMUSCULAR; INTRAVENOUS at 05:20

## 2022-01-01 RX ADMIN — CHOLESTYRAMINE 4 G: 4 POWDER, FOR SUSPENSION ORAL at 09:16

## 2022-01-01 RX ADMIN — SODIUM CHLORIDE 3 G: 900 INJECTION INTRAVENOUS at 05:25

## 2022-01-01 RX ADMIN — FAMOTIDINE 20 MG: 20 TABLET ORAL at 08:52

## 2022-01-01 RX ADMIN — INSULIN GLARGINE 40 UNITS: 100 INJECTION, SOLUTION SUBCUTANEOUS at 21:16

## 2022-01-01 RX ADMIN — PIPERACILLIN AND TAZOBACTAM 3.38 G: 3; .375 INJECTION, POWDER, LYOPHILIZED, FOR SOLUTION INTRAVENOUS at 13:11

## 2022-01-01 RX ADMIN — GLYCOPYRROLATE 0.1 MG: 0.2 INJECTION INTRAMUSCULAR; INTRAVENOUS at 14:10

## 2022-01-01 RX ADMIN — INSULIN LISPRO 3 UNITS: 100 INJECTION, SOLUTION INTRAVENOUS; SUBCUTANEOUS at 12:34

## 2022-01-01 RX ADMIN — LORAZEPAM 1 MG: 2 INJECTION INTRAMUSCULAR at 04:46

## 2022-01-01 RX ADMIN — CALCIUM POLYCARBOPHIL 625 MG: 625 TABLET, FILM COATED ORAL at 15:19

## 2022-01-01 RX ADMIN — BRIMONIDINE TARTRATE 1 DROP: 2 SOLUTION OPHTHALMIC at 22:26

## 2022-01-01 RX ADMIN — HYDRALAZINE HYDROCHLORIDE 50 MG: 50 TABLET, FILM COATED ORAL at 08:55

## 2022-01-01 RX ADMIN — METOPROLOL SUCCINATE 50 MG: 50 TABLET, EXTENDED RELEASE ORAL at 21:00

## 2022-01-01 RX ADMIN — PIPERACILLIN AND TAZOBACTAM 3.38 G: 3; .375 INJECTION, POWDER, LYOPHILIZED, FOR SOLUTION INTRAVENOUS at 04:16

## 2022-01-01 RX ADMIN — FERROUS SULFATE TAB 325 MG (65 MG ELEMENTAL FE) 325 MG: 325 (65 FE) TAB at 20:50

## 2022-01-01 RX ADMIN — HYDRALAZINE HYDROCHLORIDE 12.5 MG: 25 TABLET, FILM COATED ORAL at 21:00

## 2022-01-01 RX ADMIN — INSULIN LISPRO 12 UNITS: 100 INJECTION, SOLUTION INTRAVENOUS; SUBCUTANEOUS at 14:00

## 2022-01-01 RX ADMIN — PIPERACILLIN AND TAZOBACTAM 3.38 G: 3; .375 INJECTION, POWDER, LYOPHILIZED, FOR SOLUTION INTRAVENOUS at 21:45

## 2022-01-01 RX ADMIN — HYDROMORPHONE HYDROCHLORIDE 2 MG: 2 INJECTION, SOLUTION INTRAMUSCULAR; INTRAVENOUS; SUBCUTANEOUS at 08:31

## 2022-01-01 RX ADMIN — GABAPENTIN 300 MG: 300 CAPSULE ORAL at 21:12

## 2022-01-01 RX ADMIN — BRIMONIDINE TARTRATE 1 DROP: 2 SOLUTION OPHTHALMIC at 00:00

## 2022-01-01 RX ADMIN — Medication 2 TABLET: at 10:18

## 2022-01-01 RX ADMIN — GLYCOPYRROLATE 0.4 MG: 0.2 INJECTION INTRAMUSCULAR; INTRAVENOUS at 17:11

## 2022-01-01 RX ADMIN — FERROUS SULFATE TAB 325 MG (65 MG ELEMENTAL FE) 325 MG: 325 (65 FE) TAB at 09:14

## 2022-01-01 RX ADMIN — SODIUM CHLORIDE 100 ML/HR: 4.5 INJECTION, SOLUTION INTRAVENOUS at 05:55

## 2022-01-01 RX ADMIN — INSULIN GLARGINE 50 UNITS: 100 INJECTION, SOLUTION SUBCUTANEOUS at 09:51

## 2022-01-01 RX ADMIN — FAMOTIDINE 20 MG: 20 TABLET ORAL at 21:14

## 2022-01-01 RX ADMIN — CHOLESTYRAMINE 4 G: 4 POWDER, FOR SUSPENSION ORAL at 09:53

## 2022-01-01 RX ADMIN — INSULIN LISPRO 3 UNITS: 100 INJECTION, SOLUTION INTRAVENOUS; SUBCUTANEOUS at 11:46

## 2022-01-01 RX ADMIN — ATORVASTATIN CALCIUM 20 MG: 20 TABLET, FILM COATED ORAL at 22:23

## 2022-01-01 RX ADMIN — HEPARIN SODIUM 5000 UNITS: 5000 INJECTION, SOLUTION INTRAVENOUS; SUBCUTANEOUS at 11:32

## 2022-01-01 RX ADMIN — INSULIN GLARGINE 22 UNITS: 100 INJECTION, SOLUTION SUBCUTANEOUS at 20:39

## 2022-01-01 RX ADMIN — AMLODIPINE BESYLATE 5 MG: 5 TABLET ORAL at 09:45

## 2022-01-01 RX ADMIN — FLUCONAZOLE 200 MG: 200 INJECTION, SOLUTION INTRAVENOUS at 08:50

## 2022-01-01 RX ADMIN — FAMOTIDINE 20 MG: 20 TABLET ORAL at 10:07

## 2022-01-01 RX ADMIN — Medication 2 TABLET: at 08:56

## 2022-01-01 RX ADMIN — SODIUM CHLORIDE 3 G: 900 INJECTION INTRAVENOUS at 15:39

## 2022-01-01 RX ADMIN — SODIUM CHLORIDE 3 G: 900 INJECTION INTRAVENOUS at 22:18

## 2022-01-01 RX ADMIN — SACUBITRIL AND VALSARTAN 1 TABLET: 24; 26 TABLET, FILM COATED ORAL at 21:26

## 2022-01-01 RX ADMIN — HEPARIN SODIUM 5000 UNITS: 5000 INJECTION INTRAVENOUS; SUBCUTANEOUS at 17:18

## 2022-01-01 RX ADMIN — HYDROMORPHONE HYDROCHLORIDE 1 MG: 1 INJECTION, SOLUTION INTRAMUSCULAR; INTRAVENOUS; SUBCUTANEOUS at 08:38

## 2022-01-01 RX ADMIN — ASPIRIN 81 MG CHEWABLE TABLET 81 MG: 81 TABLET CHEWABLE at 11:11

## 2022-01-01 RX ADMIN — NITROGLYCERIN 1 INCH: 20 OINTMENT TOPICAL at 23:35

## 2022-01-01 RX ADMIN — HYDROMORPHONE HYDROCHLORIDE 1 MG: 1 INJECTION, SOLUTION INTRAMUSCULAR; INTRAVENOUS; SUBCUTANEOUS at 01:36

## 2022-01-01 RX ADMIN — MEROPENEM 1 G: 1 INJECTION, POWDER, FOR SOLUTION INTRAVENOUS at 14:15

## 2022-01-01 RX ADMIN — SODIUM CHLORIDE 3 G: 900 INJECTION INTRAVENOUS at 23:00

## 2022-01-01 RX ADMIN — ACETAMINOPHEN ORAL SOLUTION 650 MG: 650 SOLUTION ORAL at 23:25

## 2022-01-01 RX ADMIN — MEROPENEM 1 G: 1 INJECTION, POWDER, FOR SOLUTION INTRAVENOUS at 15:19

## 2022-01-01 RX ADMIN — Medication 1 SPRAY: at 22:14

## 2022-01-01 RX ADMIN — SODIUM BICARBONATE: 84 INJECTION, SOLUTION INTRAVENOUS at 23:20

## 2022-01-01 RX ADMIN — MINERAL SUPPLEMENT IRON 300 MG / 5 ML STRENGTH LIQUID 100 PER BOX UNFLAVORED 300 MG: at 08:42

## 2022-01-01 RX ADMIN — HYOSCYAMINE SULFATE: 16 SOLUTION at 21:29

## 2022-01-01 RX ADMIN — INSULIN LISPRO 12 UNITS: 100 INJECTION, SOLUTION INTRAVENOUS; SUBCUTANEOUS at 00:24

## 2022-01-01 RX ADMIN — METOPROLOL TARTRATE 50 MG: 50 TABLET, FILM COATED ORAL at 09:25

## 2022-01-01 RX ADMIN — INSULIN LISPRO 4 UNITS: 100 INJECTION, SOLUTION INTRAVENOUS; SUBCUTANEOUS at 00:57

## 2022-01-01 RX ADMIN — MINERAL SUPPLEMENT IRON 300 MG / 5 ML STRENGTH LIQUID 100 PER BOX UNFLAVORED 300 MG: at 08:47

## 2022-01-01 RX ADMIN — HYOSCYAMINE SULFATE: 16 SOLUTION at 20:50

## 2022-01-01 RX ADMIN — SODIUM CHLORIDE, PRESERVATIVE FREE 10 ML: 5 INJECTION INTRAVENOUS at 15:08

## 2022-01-01 RX ADMIN — Medication 2 TABLET: at 09:39

## 2022-01-01 RX ADMIN — DICYCLOMINE HYDROCHLORIDE 10 MG: 10 CAPSULE ORAL at 21:14

## 2022-01-01 RX ADMIN — MINERAL SUPPLEMENT IRON 300 MG / 5 ML STRENGTH LIQUID 100 PER BOX UNFLAVORED 300 MG: at 09:36

## 2022-01-01 RX ADMIN — CHOLESTYRAMINE 4 G: 4 POWDER, FOR SUSPENSION ORAL at 08:50

## 2022-01-01 RX ADMIN — SODIUM CHLORIDE, PRESERVATIVE FREE 10 ML: 5 INJECTION INTRAVENOUS at 17:43

## 2022-01-01 RX ADMIN — FERROUS SULFATE TAB 325 MG (65 MG ELEMENTAL FE) 325 MG: 325 (65 FE) TAB at 11:14

## 2022-01-01 RX ADMIN — BRIMONIDINE TARTRATE 1 DROP: 2 SOLUTION/ DROPS OPHTHALMIC at 10:27

## 2022-01-01 RX ADMIN — FERROUS SULFATE TAB 325 MG (65 MG ELEMENTAL FE) 325 MG: 325 (65 FE) TAB at 08:02

## 2022-01-01 RX ADMIN — CHOLESTYRAMINE 4 G: 4 POWDER, FOR SUSPENSION ORAL at 09:29

## 2022-01-01 RX ADMIN — FERROUS SULFATE TAB 325 MG (65 MG ELEMENTAL FE) 325 MG: 325 (65 FE) TAB at 09:04

## 2022-01-01 RX ADMIN — ASPIRIN 81 MG: 81 TABLET, COATED ORAL at 10:11

## 2022-01-01 RX ADMIN — METOPROLOL TARTRATE 50 MG: 50 TABLET, FILM COATED ORAL at 21:00

## 2022-01-01 RX ADMIN — INSULIN LISPRO 4 UNITS: 100 INJECTION, SOLUTION INTRAVENOUS; SUBCUTANEOUS at 23:34

## 2022-01-01 RX ADMIN — ASPIRIN 81 MG: 81 TABLET, COATED ORAL at 09:00

## 2022-01-01 RX ADMIN — GLYCOPYRROLATE 0.4 MG: 0.2 INJECTION INTRAMUSCULAR; INTRAVENOUS at 01:20

## 2022-01-01 RX ADMIN — BRIMONIDINE TARTRATE 1 DROP: 2 SOLUTION/ DROPS OPHTHALMIC at 09:51

## 2022-01-01 RX ADMIN — METHYLPREDNISOLONE SODIUM SUCCINATE 40 MG: 40 INJECTION, POWDER, FOR SOLUTION INTRAMUSCULAR; INTRAVENOUS at 05:34

## 2022-01-01 RX ADMIN — CHOLESTYRAMINE 4 G: 4 POWDER, FOR SUSPENSION ORAL at 16:05

## 2022-01-01 RX ADMIN — LOPERAMIDE HYDROCHLORIDE 2 MG: 2 CAPSULE ORAL at 13:29

## 2022-01-01 RX ADMIN — ACETAMINOPHEN ORAL SOLUTION 650 MG: 650 SOLUTION ORAL at 07:03

## 2022-01-01 RX ADMIN — METOPROLOL TARTRATE 50 MG: 50 TABLET, FILM COATED ORAL at 20:11

## 2022-01-01 RX ADMIN — SODIUM CHLORIDE, PRESERVATIVE FREE 10 ML: 5 INJECTION INTRAVENOUS at 21:53

## 2022-01-01 RX ADMIN — CHOLESTYRAMINE 4 G: 4 POWDER, FOR SUSPENSION ORAL at 18:49

## 2022-01-01 RX ADMIN — SODIUM CHLORIDE, PRESERVATIVE FREE 10 ML: 5 INJECTION INTRAVENOUS at 14:29

## 2022-01-01 RX ADMIN — Medication 1 SPRAY: at 10:12

## 2022-01-01 RX ADMIN — SACUBITRIL AND VALSARTAN 1 TABLET: 24; 26 TABLET, FILM COATED ORAL at 08:31

## 2022-01-01 RX ADMIN — FERROUS SULFATE TAB 325 MG (65 MG ELEMENTAL FE) 325 MG: 325 (65 FE) TAB at 20:48

## 2022-01-01 RX ADMIN — SACUBITRIL AND VALSARTAN 1 TABLET: 24; 26 TABLET, FILM COATED ORAL at 10:16

## 2022-01-01 RX ADMIN — PIPERACILLIN SODIUM AND TAZOBACTAM SODIUM 3.38 G: 3; .375 INJECTION, POWDER, LYOPHILIZED, FOR SOLUTION INTRAVENOUS at 10:28

## 2022-01-01 RX ADMIN — METOPROLOL SUCCINATE 25 MG: 25 TABLET, EXTENDED RELEASE ORAL at 08:17

## 2022-01-01 RX ADMIN — BRIMONIDINE TARTRATE 1 DROP: 2 SOLUTION OPHTHALMIC at 01:35

## 2022-01-01 RX ADMIN — INSULIN GLARGINE 10 UNITS: 100 INJECTION, SOLUTION SUBCUTANEOUS at 21:17

## 2022-01-01 RX ADMIN — HYDRALAZINE HYDROCHLORIDE 12.5 MG: 25 TABLET, FILM COATED ORAL at 22:00

## 2022-01-01 RX ADMIN — METOPROLOL TARTRATE 12.5 MG: 25 TABLET, FILM COATED ORAL at 09:16

## 2022-01-01 RX ADMIN — GLYCOPYRROLATE 0.4 MG: 0.2 INJECTION INTRAMUSCULAR; INTRAVENOUS at 20:08

## 2022-01-01 RX ADMIN — PIPERACILLIN AND TAZOBACTAM 3.38 G: 3; .375 INJECTION, POWDER, LYOPHILIZED, FOR SOLUTION INTRAVENOUS at 21:07

## 2022-01-01 RX ADMIN — Medication 1 SPRAY: at 21:59

## 2022-01-01 RX ADMIN — INSULIN LISPRO 9 UNITS: 100 INJECTION, SOLUTION INTRAVENOUS; SUBCUTANEOUS at 16:13

## 2022-01-01 RX ADMIN — SODIUM CHLORIDE, PRESERVATIVE FREE 10 ML: 5 INJECTION INTRAVENOUS at 21:46

## 2022-01-01 RX ADMIN — BRIMONIDINE TARTRATE 1 DROP: 2 SOLUTION OPHTHALMIC at 09:16

## 2022-01-01 RX ADMIN — BRIMONIDINE TARTRATE 1 DROP: 2 SOLUTION OPHTHALMIC at 23:21

## 2022-01-01 RX ADMIN — FAMOTIDINE 20 MG: 20 TABLET ORAL at 20:49

## 2022-01-01 RX ADMIN — INSULIN LISPRO 4 UNITS: 100 INJECTION, SOLUTION INTRAVENOUS; SUBCUTANEOUS at 17:18

## 2022-01-01 RX ADMIN — BRIMONIDINE TARTRATE 1 DROP: 2 SOLUTION/ DROPS OPHTHALMIC at 16:12

## 2022-01-01 RX ADMIN — INSULIN LISPRO 9 UNITS: 100 INJECTION, SOLUTION INTRAVENOUS; SUBCUTANEOUS at 23:37

## 2022-01-01 RX ADMIN — VANCOMYCIN HYDROCHLORIDE 500 MG: 500 INJECTION, POWDER, LYOPHILIZED, FOR SOLUTION INTRAVENOUS at 21:04

## 2022-01-01 RX ADMIN — FENOFIBRATE 48 MG: 48 TABLET ORAL at 08:54

## 2022-01-01 RX ADMIN — GLYCOPYRROLATE 0.4 MG: 0.2 INJECTION INTRAMUSCULAR; INTRAVENOUS at 12:40

## 2022-01-01 RX ADMIN — LATANOPROST 1 DROP: 50 SOLUTION OPHTHALMIC at 17:52

## 2022-01-01 RX ADMIN — SODIUM CHLORIDE, PRESERVATIVE FREE 10 ML: 5 INJECTION INTRAVENOUS at 22:58

## 2022-01-01 RX ADMIN — MINERAL SUPPLEMENT IRON 300 MG / 5 ML STRENGTH LIQUID 100 PER BOX UNFLAVORED 300 MG: at 08:36

## 2022-01-01 RX ADMIN — FLUCONAZOLE 200 MG: 100 TABLET ORAL at 09:30

## 2022-01-01 RX ADMIN — PIPERACILLIN SODIUM AND TAZOBACTAM SODIUM 3.38 G: 3; .375 INJECTION, POWDER, LYOPHILIZED, FOR SOLUTION INTRAVENOUS at 03:21

## 2022-01-01 RX ADMIN — INSULIN LISPRO 4 UNITS: 100 INJECTION, SOLUTION INTRAVENOUS; SUBCUTANEOUS at 13:29

## 2022-01-01 RX ADMIN — INSULIN GLARGINE 50 UNITS: 100 INJECTION, SOLUTION SUBCUTANEOUS at 09:54

## 2022-01-01 RX ADMIN — PROPOFOL 25 MCG/KG/MIN: 10 INJECTION, EMULSION INTRAVENOUS at 10:07

## 2022-01-01 RX ADMIN — BRIMONIDINE TARTRATE 1 DROP: 2 SOLUTION/ DROPS OPHTHALMIC at 21:39

## 2022-01-01 RX ADMIN — GLYCOPYRROLATE 0.4 MG: 0.2 INJECTION INTRAMUSCULAR; INTRAVENOUS at 20:25

## 2022-01-01 RX ADMIN — FAMOTIDINE 20 MG: 20 TABLET ORAL at 08:17

## 2022-01-01 RX ADMIN — FERROUS SULFATE TAB 325 MG (65 MG ELEMENTAL FE) 325 MG: 325 (65 FE) TAB at 09:54

## 2022-01-01 RX ADMIN — DEXTROSE MONOHYDRATE 125 ML: 100 INJECTION, SOLUTION INTRAVENOUS at 05:37

## 2022-01-01 RX ADMIN — NITROGLYCERIN 1 INCH: 20 OINTMENT TOPICAL at 19:33

## 2022-01-01 RX ADMIN — ASPIRIN 81 MG CHEWABLE TABLET 81 MG: 81 TABLET CHEWABLE at 11:06

## 2022-01-01 RX ADMIN — BRIMONIDINE TARTRATE 1 DROP: 2 SOLUTION OPHTHALMIC at 16:53

## 2022-01-01 RX ADMIN — AMLODIPINE BESYLATE 5 MG: 5 TABLET ORAL at 16:46

## 2022-01-01 RX ADMIN — HEPARIN SODIUM 5000 UNITS: 5000 INJECTION INTRAVENOUS; SUBCUTANEOUS at 00:18

## 2022-01-01 RX ADMIN — GABAPENTIN 300 MG: 300 CAPSULE ORAL at 08:55

## 2022-01-01 RX ADMIN — CALCIUM POLYCARBOPHIL 625 MG: 625 TABLET, FILM COATED ORAL at 08:47

## 2022-01-01 RX ADMIN — ASPIRIN 81 MG CHEWABLE TABLET 81 MG: 81 TABLET CHEWABLE at 09:13

## 2022-01-01 RX ADMIN — SODIUM CHLORIDE 3 G: 900 INJECTION INTRAVENOUS at 05:08

## 2022-01-01 RX ADMIN — SODIUM CHLORIDE, PRESERVATIVE FREE 10 ML: 5 INJECTION INTRAVENOUS at 15:41

## 2022-01-01 RX ADMIN — GLYCOPYRROLATE 0.4 MG: 0.2 INJECTION INTRAMUSCULAR; INTRAVENOUS at 01:01

## 2022-01-01 RX ADMIN — HEPARIN SODIUM 5000 UNITS: 5000 INJECTION INTRAVENOUS; SUBCUTANEOUS at 22:46

## 2022-01-01 RX ADMIN — METOPROLOL TARTRATE 12.5 MG: 25 TABLET, FILM COATED ORAL at 11:35

## 2022-01-01 RX ADMIN — CEFTOLOZANE AND TAZOBACTAM 3 G: 1; .5 INJECTION, POWDER, LYOPHILIZED, FOR SOLUTION INTRAVENOUS at 23:43

## 2022-01-01 RX ADMIN — HYDRALAZINE HYDROCHLORIDE 12.5 MG: 25 TABLET, FILM COATED ORAL at 20:51

## 2022-01-01 RX ADMIN — INSULIN GLARGINE 50 UNITS: 100 INJECTION, SOLUTION SUBCUTANEOUS at 11:07

## 2022-01-01 RX ADMIN — PSYLLIUM HUSK 1 PACKET: 3.4 POWDER ORAL at 11:11

## 2022-01-01 RX ADMIN — HYDROMORPHONE HYDROCHLORIDE 1 MG: 1 INJECTION, SOLUTION INTRAMUSCULAR; INTRAVENOUS; SUBCUTANEOUS at 00:24

## 2022-01-01 RX ADMIN — GLYCOPYRROLATE 0.4 MG: 0.2 INJECTION INTRAMUSCULAR; INTRAVENOUS at 00:11

## 2022-01-01 RX ADMIN — FERROUS SULFATE TAB 325 MG (65 MG ELEMENTAL FE) 325 MG: 325 (65 FE) TAB at 09:40

## 2022-01-01 RX ADMIN — CHOLESTYRAMINE 4 G: 4 POWDER, FOR SUSPENSION ORAL at 08:45

## 2022-01-01 RX ADMIN — GLYCOPYRROLATE 0.4 MG: 0.2 INJECTION INTRAMUSCULAR; INTRAVENOUS at 20:15

## 2022-01-01 RX ADMIN — GLYCOPYRROLATE 0.1 MG: 0.2 INJECTION INTRAMUSCULAR; INTRAVENOUS at 16:14

## 2022-01-01 RX ADMIN — VANCOMYCIN HYDROCHLORIDE 500 MG: 500 INJECTION, POWDER, LYOPHILIZED, FOR SOLUTION INTRAVENOUS at 16:09

## 2022-01-01 RX ADMIN — FUROSEMIDE 40 MG: 10 INJECTION, SOLUTION INTRAMUSCULAR; INTRAVENOUS at 10:28

## 2022-01-01 RX ADMIN — SODIUM CHLORIDE, PRESERVATIVE FREE 10 ML: 5 INJECTION INTRAVENOUS at 18:16

## 2022-01-01 RX ADMIN — GABAPENTIN 300 MG: 300 CAPSULE ORAL at 21:49

## 2022-01-01 RX ADMIN — BRIMONIDINE TARTRATE 1 DROP: 2 SOLUTION OPHTHALMIC at 15:11

## 2022-01-01 RX ADMIN — CHOLESTYRAMINE 4 G: 4 POWDER, FOR SUSPENSION ORAL at 16:57

## 2022-01-01 RX ADMIN — METOPROLOL SUCCINATE 50 MG: 50 TABLET, EXTENDED RELEASE ORAL at 20:46

## 2022-01-01 RX ADMIN — TRAMADOL HYDROCHLORIDE 50 MG: 50 TABLET, COATED ORAL at 22:01

## 2022-01-01 RX ADMIN — BRIMONIDINE TARTRATE 1 DROP: 2 SOLUTION/ DROPS OPHTHALMIC at 20:51

## 2022-01-01 RX ADMIN — ATORVASTATIN CALCIUM 20 MG: 20 TABLET, FILM COATED ORAL at 22:06

## 2022-01-01 RX ADMIN — SODIUM CHLORIDE 3 G: 900 INJECTION INTRAVENOUS at 05:11

## 2022-01-01 RX ADMIN — CEFTRIAXONE SODIUM 1 G: 1 INJECTION, POWDER, FOR SOLUTION INTRAMUSCULAR; INTRAVENOUS at 16:41

## 2022-01-01 RX ADMIN — SODIUM CHLORIDE, PRESERVATIVE FREE 10 ML: 5 INJECTION INTRAVENOUS at 15:07

## 2022-01-01 RX ADMIN — GLYCOPYRROLATE 0.1 MG: 0.2 INJECTION INTRAMUSCULAR; INTRAVENOUS at 22:19

## 2022-01-01 RX ADMIN — VANCOMYCIN HYDROCHLORIDE 500 MG: 500 INJECTION, POWDER, LYOPHILIZED, FOR SOLUTION INTRAVENOUS at 08:49

## 2022-01-01 RX ADMIN — ALBUTEROL SULFATE 10 MG: 2.5 SOLUTION RESPIRATORY (INHALATION) at 00:22

## 2022-01-01 RX ADMIN — INSULIN LISPRO 6 UNITS: 100 INJECTION, SOLUTION INTRAVENOUS; SUBCUTANEOUS at 16:55

## 2022-01-01 RX ADMIN — LATANOPROST 1 DROP: 50 SOLUTION OPHTHALMIC at 17:40

## 2022-01-01 RX ADMIN — PIPERACILLIN SODIUM AND TAZOBACTAM SODIUM 3.38 G: 3; .375 INJECTION, POWDER, LYOPHILIZED, FOR SOLUTION INTRAVENOUS at 10:01

## 2022-01-01 RX ADMIN — INSULIN GLARGINE 32 UNITS: 100 INJECTION, SOLUTION SUBCUTANEOUS at 10:23

## 2022-01-01 RX ADMIN — AMLODIPINE BESYLATE 5 MG: 5 TABLET ORAL at 09:43

## 2022-01-01 RX ADMIN — HYDROMORPHONE HYDROCHLORIDE 8 MG: 2 TABLET ORAL at 10:11

## 2022-01-01 RX ADMIN — HYDRALAZINE HYDROCHLORIDE 25 MG: 25 TABLET, FILM COATED ORAL at 21:00

## 2022-01-01 RX ADMIN — HYDROMORPHONE HYDROCHLORIDE 2 MG: 2 INJECTION, SOLUTION INTRAMUSCULAR; INTRAVENOUS; SUBCUTANEOUS at 20:33

## 2022-01-01 RX ADMIN — INSULIN LISPRO 10 UNITS: 100 INJECTION, SOLUTION INTRAVENOUS; SUBCUTANEOUS at 23:49

## 2022-01-01 RX ADMIN — CHOLESTYRAMINE 4 G: 4 POWDER, FOR SUSPENSION ORAL at 17:29

## 2022-01-01 RX ADMIN — BRIMONIDINE TARTRATE 1 DROP: 2 SOLUTION OPHTHALMIC at 16:42

## 2022-01-01 RX ADMIN — FERROUS SULFATE TAB 325 MG (65 MG ELEMENTAL FE) 325 MG: 325 (65 FE) TAB at 08:03

## 2022-01-01 RX ADMIN — GLYCOPYRROLATE 0.1 MG: 0.2 INJECTION INTRAMUSCULAR; INTRAVENOUS at 04:48

## 2022-01-01 RX ADMIN — FERROUS SULFATE TAB 325 MG (65 MG ELEMENTAL FE) 325 MG: 325 (65 FE) TAB at 09:52

## 2022-01-01 RX ADMIN — GLYCOPYRROLATE 0.4 MG: 0.2 INJECTION INTRAMUSCULAR; INTRAVENOUS at 11:11

## 2022-01-01 RX ADMIN — SODIUM CHLORIDE 50 ML/HR: 9 INJECTION, SOLUTION INTRAVENOUS at 06:21

## 2022-01-01 RX ADMIN — MEROPENEM 1 G: 1 INJECTION, POWDER, FOR SOLUTION INTRAVENOUS at 00:25

## 2022-01-01 RX ADMIN — SACUBITRIL AND VALSARTAN 1 TABLET: 24; 26 TABLET, FILM COATED ORAL at 21:16

## 2022-01-01 RX ADMIN — ACETAMINOPHEN 650 MG: 325 TABLET ORAL at 16:51

## 2022-01-01 RX ADMIN — FAMOTIDINE 20 MG: 20 TABLET ORAL at 20:20

## 2022-01-01 RX ADMIN — GABAPENTIN 300 MG: 300 CAPSULE ORAL at 21:27

## 2022-01-01 RX ADMIN — HEPARIN SODIUM 5000 UNITS: 5000 INJECTION INTRAVENOUS; SUBCUTANEOUS at 00:44

## 2022-01-01 RX ADMIN — NITROGLYCERIN 1 INCH: 20 OINTMENT TOPICAL at 00:58

## 2022-01-01 RX ADMIN — SODIUM CHLORIDE, PRESERVATIVE FREE 10 ML: 5 INJECTION INTRAVENOUS at 21:16

## 2022-01-01 RX ADMIN — PIPERACILLIN SODIUM AND TAZOBACTAM SODIUM 3.38 G: 3; .375 INJECTION, POWDER, LYOPHILIZED, FOR SOLUTION INTRAVENOUS at 09:37

## 2022-01-01 RX ADMIN — HYDRALAZINE HYDROCHLORIDE 50 MG: 50 TABLET, FILM COATED ORAL at 09:25

## 2022-01-01 RX ADMIN — INSULIN LISPRO 3 UNITS: 100 INJECTION, SOLUTION INTRAVENOUS; SUBCUTANEOUS at 12:56

## 2022-01-01 RX ADMIN — ATORVASTATIN CALCIUM 20 MG: 20 TABLET, FILM COATED ORAL at 20:54

## 2022-01-01 RX ADMIN — IPRATROPIUM BROMIDE AND ALBUTEROL SULFATE 3 ML: .5; 3 SOLUTION RESPIRATORY (INHALATION) at 08:03

## 2022-01-01 RX ADMIN — Medication 1 SPRAY: at 16:10

## 2022-01-01 RX ADMIN — INSULIN GLARGINE 32 UNITS: 100 INJECTION, SOLUTION SUBCUTANEOUS at 09:26

## 2022-01-01 RX ADMIN — DEXTROSE MONOHYDRATE 30 ML/HR: 50 INJECTION, SOLUTION INTRAVENOUS at 14:09

## 2022-01-01 RX ADMIN — INSULIN LISPRO 2 UNITS: 100 INJECTION, SOLUTION INTRAVENOUS; SUBCUTANEOUS at 05:55

## 2022-01-01 RX ADMIN — SODIUM CHLORIDE, PRESERVATIVE FREE 10 ML: 5 INJECTION INTRAVENOUS at 21:27

## 2022-01-01 RX ADMIN — NITROGLYCERIN 1 INCH: 20 OINTMENT TOPICAL at 06:05

## 2022-01-01 RX ADMIN — HYDROMORPHONE HYDROCHLORIDE 8 MG: 2 TABLET ORAL at 05:07

## 2022-01-01 RX ADMIN — HEPARIN SODIUM 5000 UNITS: 5000 INJECTION INTRAVENOUS; SUBCUTANEOUS at 23:17

## 2022-01-01 RX ADMIN — Medication 1 SPRAY: at 21:24

## 2022-01-01 RX ADMIN — HYDRALAZINE HYDROCHLORIDE 50 MG: 50 TABLET, FILM COATED ORAL at 22:06

## 2022-01-01 RX ADMIN — PSYLLIUM HUSK 1 PACKET: 3.4 POWDER ORAL at 21:16

## 2022-01-01 RX ADMIN — METOPROLOL SUCCINATE 50 MG: 50 TABLET, EXTENDED RELEASE ORAL at 22:03

## 2022-01-01 RX ADMIN — FUROSEMIDE 20 MG: 10 INJECTION, SOLUTION INTRAMUSCULAR; INTRAVENOUS at 22:11

## 2022-01-01 RX ADMIN — METOPROLOL TARTRATE 12.5 MG: 25 TABLET, FILM COATED ORAL at 09:47

## 2022-01-01 RX ADMIN — Medication 1 SPRAY: at 15:22

## 2022-01-01 RX ADMIN — ASPIRIN 81 MG CHEWABLE TABLET 81 MG: 81 TABLET CHEWABLE at 09:11

## 2022-01-01 RX ADMIN — MINERAL SUPPLEMENT IRON 300 MG / 5 ML STRENGTH LIQUID 100 PER BOX UNFLAVORED 300 MG: at 13:29

## 2022-01-01 RX ADMIN — PROPOFOL 25 MCG/KG/MIN: 10 INJECTION, EMULSION INTRAVENOUS at 05:35

## 2022-01-01 RX ADMIN — LABETALOL HYDROCHLORIDE 10 MG: 5 INJECTION, SOLUTION INTRAVENOUS at 12:24

## 2022-01-01 RX ADMIN — Medication 1 SPRAY: at 17:20

## 2022-01-01 RX ADMIN — INSULIN LISPRO 5 UNITS: 100 INJECTION, SOLUTION INTRAVENOUS; SUBCUTANEOUS at 00:05

## 2022-01-01 RX ADMIN — NITROGLYCERIN 1 INCH: 20 OINTMENT TOPICAL at 00:47

## 2022-01-01 RX ADMIN — Medication 1 SPRAY: at 22:06

## 2022-01-01 RX ADMIN — SODIUM CHLORIDE, PRESERVATIVE FREE 10 ML: 5 INJECTION INTRAVENOUS at 13:13

## 2022-01-01 RX ADMIN — CHOLESTYRAMINE 4 G: 4 POWDER, FOR SUSPENSION ORAL at 10:28

## 2022-01-01 RX ADMIN — FUROSEMIDE 20 MG: 10 INJECTION, SOLUTION INTRAMUSCULAR; INTRAVENOUS at 21:30

## 2022-01-01 RX ADMIN — Medication 1 SPRAY: at 21:06

## 2022-01-01 RX ADMIN — ATORVASTATIN CALCIUM 20 MG: 20 TABLET, FILM COATED ORAL at 23:39

## 2022-01-01 RX ADMIN — HYDRALAZINE HYDROCHLORIDE 12.5 MG: 25 TABLET, FILM COATED ORAL at 17:01

## 2022-01-01 RX ADMIN — METOPROLOL TARTRATE 50 MG: 50 TABLET, FILM COATED ORAL at 20:56

## 2022-01-01 RX ADMIN — CHOLESTYRAMINE 4 G: 4 POWDER, FOR SUSPENSION ORAL at 10:24

## 2022-01-01 RX ADMIN — FLUCONAZOLE, SODIUM CHLORIDE 400 MG: 2 INJECTION INTRAVENOUS at 15:47

## 2022-01-01 RX ADMIN — HYDRALAZINE HYDROCHLORIDE 50 MG: 50 TABLET, FILM COATED ORAL at 09:43

## 2022-01-01 RX ADMIN — PIPERACILLIN SODIUM AND TAZOBACTAM SODIUM 3.38 G: 3; .375 INJECTION, POWDER, LYOPHILIZED, FOR SOLUTION INTRAVENOUS at 23:14

## 2022-01-01 RX ADMIN — FERROUS SULFATE TAB 325 MG (65 MG ELEMENTAL FE) 325 MG: 325 (65 FE) TAB at 09:17

## 2022-01-01 RX ADMIN — INSULIN GLARGINE 25 UNITS: 100 INJECTION, SOLUTION SUBCUTANEOUS at 09:07

## 2022-01-01 RX ADMIN — ENOXAPARIN SODIUM 40 MG: 100 INJECTION SUBCUTANEOUS at 11:27

## 2022-01-01 RX ADMIN — INSULIN LISPRO 4 UNITS: 100 INJECTION, SOLUTION INTRAVENOUS; SUBCUTANEOUS at 18:25

## 2022-01-01 RX ADMIN — ACETAMINOPHEN ORAL SOLUTION 650 MG: 650 SOLUTION ORAL at 01:04

## 2022-01-01 RX ADMIN — INSULIN LISPRO 10 UNITS: 100 INJECTION, SOLUTION INTRAVENOUS; SUBCUTANEOUS at 13:47

## 2022-01-01 RX ADMIN — HEPARIN SODIUM 5000 UNITS: 5000 INJECTION INTRAVENOUS; SUBCUTANEOUS at 18:57

## 2022-01-01 RX ADMIN — GLYCOPYRROLATE 0.4 MG: 0.2 INJECTION INTRAMUSCULAR; INTRAVENOUS at 12:48

## 2022-01-01 RX ADMIN — BRIMONIDINE TARTRATE 1 DROP: 2 SOLUTION/ DROPS OPHTHALMIC at 20:50

## 2022-01-01 RX ADMIN — LORAZEPAM 1 MG: 2 INJECTION INTRAMUSCULAR at 17:35

## 2022-01-01 RX ADMIN — METOPROLOL SUCCINATE 50 MG: 50 TABLET, EXTENDED RELEASE ORAL at 09:18

## 2022-01-01 RX ADMIN — HYDRALAZINE HYDROCHLORIDE 20 MG: 20 INJECTION, SOLUTION INTRAMUSCULAR; INTRAVENOUS at 23:23

## 2022-01-01 RX ADMIN — FAMOTIDINE 20 MG: 20 TABLET ORAL at 20:56

## 2022-01-01 RX ADMIN — HYDRALAZINE HYDROCHLORIDE 50 MG: 50 TABLET, FILM COATED ORAL at 09:59

## 2022-01-01 RX ADMIN — Medication 1 SPRAY: at 10:26

## 2022-01-01 RX ADMIN — INSULIN LISPRO 10 UNITS: 100 INJECTION, SOLUTION INTRAVENOUS; SUBCUTANEOUS at 09:15

## 2022-01-01 RX ADMIN — ASPIRIN 81 MG CHEWABLE TABLET 81 MG: 81 TABLET CHEWABLE at 10:02

## 2022-01-01 RX ADMIN — INSULIN LISPRO 3 UNITS: 100 INJECTION, SOLUTION INTRAVENOUS; SUBCUTANEOUS at 11:58

## 2022-01-01 RX ADMIN — INSULIN GLARGINE 50 UNITS: 100 INJECTION, SOLUTION SUBCUTANEOUS at 22:00

## 2022-01-01 RX ADMIN — METOPROLOL SUCCINATE 25 MG: 25 TABLET, EXTENDED RELEASE ORAL at 08:02

## 2022-01-01 RX ADMIN — PROPOFOL 20 MCG/KG/MIN: 10 INJECTION, EMULSION INTRAVENOUS at 10:00

## 2022-01-01 RX ADMIN — INSULIN LISPRO 1 UNITS: 100 INJECTION, SOLUTION INTRAVENOUS; SUBCUTANEOUS at 00:19

## 2022-01-01 RX ADMIN — LORAZEPAM 1 MG: 2 INJECTION INTRAMUSCULAR at 18:23

## 2022-01-01 RX ADMIN — BRIMONIDINE TARTRATE 1 DROP: 2 SOLUTION OPHTHALMIC at 22:29

## 2022-01-01 RX ADMIN — INSULIN LISPRO 1 UNITS: 100 INJECTION, SOLUTION INTRAVENOUS; SUBCUTANEOUS at 06:20

## 2022-01-01 RX ADMIN — FUROSEMIDE 20 MG: 10 INJECTION, SOLUTION INTRAMUSCULAR; INTRAVENOUS at 09:14

## 2022-01-01 RX ADMIN — GABAPENTIN 300 MG: 300 CAPSULE ORAL at 10:12

## 2022-01-01 RX ADMIN — FUROSEMIDE 40 MG: 10 INJECTION, SOLUTION INTRAMUSCULAR; INTRAVENOUS at 08:50

## 2022-01-01 RX ADMIN — SODIUM CHLORIDE 3 G: 900 INJECTION INTRAVENOUS at 21:32

## 2022-01-01 RX ADMIN — BRIMONIDINE TARTRATE 1 DROP: 2 SOLUTION OPHTHALMIC at 20:44

## 2022-01-01 RX ADMIN — ONDANSETRON 4 MG: 2 INJECTION INTRAMUSCULAR; INTRAVENOUS at 18:16

## 2022-01-01 RX ADMIN — HYDRALAZINE HYDROCHLORIDE 12.5 MG: 25 TABLET, FILM COATED ORAL at 08:01

## 2022-01-01 RX ADMIN — ASPIRIN 81 MG: 81 TABLET, COATED ORAL at 08:32

## 2022-01-01 RX ADMIN — HYDROMORPHONE HYDROCHLORIDE 1 MG: 1 INJECTION, SOLUTION INTRAMUSCULAR; INTRAVENOUS; SUBCUTANEOUS at 18:23

## 2022-01-01 RX ADMIN — INSULIN GLARGINE 32 UNITS: 100 INJECTION, SOLUTION SUBCUTANEOUS at 08:34

## 2022-01-01 RX ADMIN — SACUBITRIL AND VALSARTAN 1 TABLET: 24; 26 TABLET, FILM COATED ORAL at 20:54

## 2022-01-01 RX ADMIN — INSULIN LISPRO 4 UNITS: 100 INJECTION, SOLUTION INTRAVENOUS; SUBCUTANEOUS at 13:55

## 2022-01-01 RX ADMIN — FERROUS SULFATE TAB 325 MG (65 MG ELEMENTAL FE) 325 MG: 325 (65 FE) TAB at 08:19

## 2022-01-01 RX ADMIN — HEPARIN SODIUM 5000 UNITS: 5000 INJECTION INTRAVENOUS; SUBCUTANEOUS at 13:52

## 2022-02-21 PROBLEM — R77.8 ELEVATED TROPONIN: Status: ACTIVE | Noted: 2022-01-01

## 2022-02-21 PROBLEM — I63.9 CVA (CEREBRAL VASCULAR ACCIDENT) (HCC): Status: ACTIVE | Noted: 2022-01-01

## 2022-02-22 NOTE — PROGRESS NOTES
SON  provided to patient/representative with verbal explanation of the notice. Time allotted for questions regarding the notice. Patient /representative provided a completed copy of the SON notice. Copy placed on bedside chart. Daughter with pt  Tacho Carson @ [phone] )  & signed.

## 2022-02-22 NOTE — H&P
History and Physical    Patient: Darci Allan MRN: [id number]  SSN: [id number]    YOB: 1945  Age: 78 y.o. Sex: female      Subjective:      Darci Allan is a 78 y.o. female who has a history of diabetes hypertension hyperlipidemia PAD with PCI history of recent stroke was discharged home was in the restroom on the daughter noticed a change in patient's status. She denies any chest pain or shortness of breath no cough no chills she is diabetic, her high blood pressure chronic renal disease    Past Medical History:   Diagnosis Date    Diabetes mellitus ([facility] [address]     HTN (hypertension)     Hyperlipidemia     Neuropathy     PAD (peripheral artery disease) (HCC)     PCI    Sciatica      Past Surgical History:   Procedure Laterality Date    HX AMPUTATION Right     great toe    HX CERVICAL FUSION      HX OTHER SURGICAL Bilateral     Stent placement    HX OTHER SURGICAL      HX VASCULAR STENT Bilateral     2 in right 1 in left    HX VASCULAR STENT Bilateral       Family History   Problem Relation Age of Onset    Cancer Mother     Diabetes Mother     Hypertension Mother      Social History     Tobacco Use    Smoking status: Never Smoker    Smokeless tobacco: Never Used   Substance Use Topics    Alcohol use: Not Currently      Prior to Admission medications    Medication Sig Start Date End Date Taking?  Authorizing Provider   atorvastatin (LIPITOR) 20 mg tablet TAKE 1 TABLET BY MOUTH EVERY NIGHT 2/3/22   Johnnie Nickerson MD   gabapentin (NEURONTIN) 300 mg capsule TAKE 2 CAPSULES BY MOUTH THREE TIMES DAILY WITH MEALS 11/9/21   Shin Rivera MD   Blood-Glucose Meter (OneTouch Verio Flex meter) misc Test blood glucose 3 time daily Dx Code: E11.65 7/6/21   Shin Rivera MD   glucose blood VI test strips (OneTouch Verio test strips) strip Test blood glucose 3 time daily Dx Code: E11.65 7/6/21   Shin Rivera MD   lancets (OneTouch Delica Plus Lancet) 33 gauge misc Test blood glucose 3 time daily Dx Code: E11.65 7/6/21   Kimber Khan MD   BD [place] 2nd Gen Pen Needle 32 gauge x 5/32\" ndle USE TO INJECT LANTUS AND VICTOZA DAILY 7/6/21   Kimber Khan MD   insulin glargine (Lantus Solostar U-100 Insulin) 100 unit/mL (3 mL) inpn 22 Units by SubCUTAneous route daily. 6/18/21   Sherin Nickerson MD   liraglutide (Victoza 3-Sheldon) 0.6 mg/0.1 mL (18 mg/3 mL) pnij ADMINISTER 1.8 MG UNDER THE SKIN DAILY. 6/18/21   Kimber Khan MD   dilTIAZem ER (CARDIZEM CD) 180 mg capsule TAKE 1 CAPSULE BY MOUTH DAILY 6/24/20   Kimber Khan MD   brimonidine (ALPHAGAN) 0.2 % ophthalmic solution Administer 1 Drop to both eyes three (3) times daily. Provider, Historical   aspirin delayed-release 81 mg tablet Take  by mouth daily. Provider, Historical   metFORMIN (GLUCOPHAGE) 1,000 mg tablet TAKE 1/2 TABLET BY MOUTH TWICE DAILY WITH MEALS 3/23/20   Kimber Khan MD   losartan (COZAAR) 50 mg tablet TK 2 TS PO QD IN THE EVENING 8/27/19   Provider, Historical   VICTOZA 3-SHELODN 0.6 mg/0.1 mL (18 mg/3 mL) pnij ADMINISTER 1.8 MG UNDER THE SKIN EVERY MORNING 4/29/19   Kimber Khan MD   clopidogrel (PLAVIX) 75 mg tab Take 75 mg by mouth daily. Provider, Historical   CHEY[place]NE PEN NEEDLE 32 gauge x 5/32\" ndle USE TO INJECT LANTUS AND VICTOZA EVERY DAY 3/10/18   Kimber Khan MD   ferrous sulfate 325 mg (65 mg iron) tablet Take 1 Tab by mouth two (2) times a day. 12/19/17   Kimber Khan MD   polyethylene glycol (MIRALAX) 17 gram packet Take 1 Packet by mouth daily. 7/18/16   Sherin Nickerson MD   oxyCODONE IR (ROXICODONE) 5 mg immediate release tablet Take 5 mg by mouth every six (6) hours as needed for Pain. Provider, Historical   HYDROcodone-acetaminophen (NORCO) 5-325 mg per tablet Take  by mouth. Provider, Historical        No Known Allergies    Review of Systems:  Review of systems not obtained due to patient factors.     Objective:     Vitals: 02/22/22 0800 02/22/22 1133 02/22/22 1200 02/22/22 1455   BP:  (!) 169/63  (!) 134/56   Pulse: 79 83 81 76   Resp:  22  18   Temp:  97.7 °F (36.5 °C)  97.8 °F (36.6 °C)   SpO2:  98% 97% 99%   Weight:       Height:            Physical Exam:  General:  Alert, cooperative, no distress, appears stated age. Eyes:  Conjunctivae/corneas clear. PERRL, EOMs intact. Fundi benign   Ears:  Normal TMs and external ear canals both ears. Nose: Nares normal. Septum midline. Mucosa normal. No drainage or sinus tenderness. Mouth/Throat: Lips, mucosa, and tongue normal. Teeth and gums normal.   Neck: Supple, symmetrical, trachea midline, no adenopathy, thyroid: no enlargment/tenderness/nodules, no carotid bruit and no JVD. Back:   Symmetric, no curvature. ROM normal. No CVA tenderness. Lungs:   Clear to auscultation bilaterally. Heart:  Regular rate and rhythm, S1, S2 normal, no murmur, click, rub or gallop. Abdomen:   Soft, non-tender. Bowel sounds normal. No masses,  No organomegaly. Extremities: Extremities weakness, atraumatic, no cyanosis or edema. Pulses: 2+ and symmetric all extremities.    Skin: Skin color, texture, turgor normal. No rashes or lesions   Lymph nodes: Cervical, supraclavicular, and axillary nodes normal.   Neurologic: Weakness with R hemiparesise     Recent Results (from the past 24 hour(s))   GLUCOSE, POC    Collection Time: 02/21/22  9:21 PM   Result Value Ref Range    Glucose (POC) 168 (H) 65 - 117 mg/dL    Performed by Hetal Mercedes    EKG, 12 LEAD, INITIAL    Collection Time: 02/21/22  9:39 PM   Result Value Ref Range    Ventricular Rate 70 BPM    Atrial Rate 70 BPM    P-R Interval 140 ms    QRS Duration 98 ms    Q-T Interval 440 ms    QTC Calculation (Bezet) 475 ms    Calculated P Axis 50 degrees    Calculated R Axis 32 degrees    Calculated T Axis 52 degrees    Diagnosis       Normal sinus rhythm  Minimal voltage criteria for LVH, may be normal variant  Nonspecific T wave abnormality  Abnormal ECG  When compared with ECG of 20-APR-2017 14:50,  No significant change was found  Confirmed by [facility] MD, Cruzita Duverney ([id number]) on 2/22/2022 2:02:42 PM     CBC WITH AUTOMATED DIFF    Collection Time: 02/21/22  9:42 PM   Result Value Ref Range    WBC 12.9 (H) 3.6 - 11.0 K/uL    RBC 3.03 (L) 3.80 - 5.20 M/uL    HGB 8.4 (L) 11.5 - 16.0 g/dL    HCT 26.5 (L) 35.0 - 47.0 %    MCV 87.5 80.0 - 99.0 FL    MCH 27.7 26.0 - 34.0 PG    MCHC 31.7 30.0 - 36.5 g/dL    RDW 16.4 (H) 11.5 - 14.5 %    PLATELET 935 674 - 271 K/uL    MPV 10.4 8.9 - 12.9 FL    NRBC 0.0 0.0  WBC    ABSOLUTE NRBC 0.00 0.00 - 0.01 K/uL    NEUTROPHILS 72 32 - 75 %    LYMPHOCYTES 19 12 - 49 %    MONOCYTES 7 5 - 13 %    EOSINOPHILS 1 0 - 7 %    BASOPHILS 0 0 - 1 %    IMMATURE GRANULOCYTES 1 (H) 0 - 0.5 %    ABS. NEUTROPHILS 9.3 (H) 1.8 - 8.0 K/UL    ABS. LYMPHOCYTES 2.4 0.8 - 3.5 K/UL    ABS. MONOCYTES 0.9 0.0 - 1.0 K/UL    ABS. EOSINOPHILS 0.2 0.0 - 0.4 K/UL    ABS. BASOPHILS 0.0 0.0 - 0.1 K/UL    ABS. IMM. GRANS. 0.1 (H) 0.00 - 0.04 K/UL    DF AUTOMATED     METABOLIC PANEL, COMPREHENSIVE    Collection Time: 02/21/22  9:42 PM   Result Value Ref Range    Sodium 137 136 - 145 mmol/L    Potassium Hemolyzed, recollect requested 3.5 - 5.1 mmol/L    Chloride 109 (H) 97 - 108 mmol/L    CO2 20 (L) 21 - 32 mmol/L    Anion gap 8 5 - 15 mmol/L    Glucose 177 (H) 65 - 100 mg/dL    BUN 73 (H) 6 - 20 mg/dL    Creatinine 1.93 (H) 0.55 - 1.02 mg/dL    BUN/Creatinine ratio 38 (H) 12 - 20      GFR est AA 31 (L) >60 ml/min/1.73m2    GFR est non-AA 25 (L) >60 ml/min/1.73m2    Calcium 8.7 8.5 - 10.1 mg/dL    Bilirubin, total 0.8 0.2 - 1.0 mg/dL    AST (SGOT) Hemolyzed, recollect requested 15 - 37 U/L    ALT (SGPT) 36 12 - 78 U/L    Alk.  phosphatase 82 45 - 117 U/L    Protein, total 6.7 6.4 - 8.2 g/dL    Albumin 2.3 (L) 3.5 - 5.0 g/dL    Globulin 4.4 (H) 2.0 - 4.0 g/dL    A-G Ratio 0.5 (L) 1.1 - 2.2     TROPONIN-HIGH SENSITIVITY    Collection Time: 02/21/22 9:42 PM   Result Value Ref Range    Troponin-High Sensitivity 671 (HH) 0 - 51 ng/L   GLUCOSE, POC    Collection Time: 02/22/22  3:37 AM   Result Value Ref Range    Glucose (POC) 154 (H) 65 - 117 mg/dL    Performed by Mamadou Mendez    EKG, 12 LEAD, INITIAL    Collection Time: 02/22/22 10:24 AM   Result Value Ref Range    Ventricular Rate 81 BPM    Atrial Rate 81 BPM    P-R Interval 162 ms    QRS Duration 100 ms    Q-T Interval 412 ms    QTC Calculation (Bezet) 478 ms    Calculated P Axis 38 degrees    Calculated R Axis 39 degrees    Calculated T Axis 45 degrees    Diagnosis       Normal sinus rhythm  Nonspecific T wave abnormality  Prolonged QT  Abnormal ECG  When compared with ECG of 21-FEB-2022 21:39, (Unconfirmed)  No significant change was found  Confirmed by Martha Lerner MD, SANKET [id number]) on 2/22/2022 10:46:19 AM     GLUCOSE, POC    Collection Time: 02/22/22 10:45 AM   Result Value Ref Range    Glucose (POC) 173 (H) 65 - 117 mg/dL    Performed by Danie Falcon    TROPONIN-HIGH SENSITIVITY    Collection Time: 02/22/22 11:57 AM   Result Value Ref Range    Troponin-High Sensitivity 483 (HH) 0 - 51 ng/L   METABOLIC PANEL, BASIC    Collection Time: 02/22/22 11:57 AM   Result Value Ref Range    Sodium 141 136 - 145 mmol/L    Potassium 4.2 3.5 - 5.1 mmol/L    Chloride 110 (H) 97 - 108 mmol/L    CO2 21 21 - 32 mmol/L    Anion gap 10 5 - 15 mmol/L    Glucose 190 (H) 65 - 100 mg/dL    BUN 63 (H) 6 - 20 mg/dL    Creatinine 1.64 (H) 0.55 - 1.02 mg/dL    BUN/Creatinine ratio 38 (H) 12 - 20      GFR est AA 37 (L) >60 ml/min/1.73m2    GFR est non-AA 31 (L) >60 ml/min/1.73m2    Calcium 9.0 8.5 - 10.1 mg/dL   EKG, 12 LEAD, INITIAL    Collection Time: 02/22/22  2:33 PM   Result Value Ref Range    Ventricular Rate 77 BPM    Atrial Rate 77 BPM    P-R Interval 160 ms    QRS Duration 98 ms    Q-T Interval 426 ms    QTC Calculation (Bezet) 482 ms    Calculated P Axis 60 degrees    Calculated R Axis 38 degrees    Calculated T Axis 53 degrees    Diagnosis       Normal sinus rhythm  Minimal voltage criteria for LVH, may be normal variant  Nonspecific T wave abnormality  Prolonged QT  Abnormal ECG  When compared with ECG of 22-FEB-2022 10:24,  No significant change was found  Confirmed by Lorri Powell MD, Mj Cousin ([id number]) on 2/22/2022 3:01:09 PM           Assessment:     Hospital Problems  Date Reviewed: 11/8/2021          Codes Class Noted POA    CVA (cerebral vascular accident) [facility]) ICD-10-CM: I63.9  ICD-9-CM: 434.91  2/21/2022 Unknown        Elevated troponin ICD-10-CM: R77.8  ICD-9-CM: 790.6  2/21/2022 Unknown          Non-STEMI with severely elevated troponin I  Renal failure stage IV  History of stent CVA  Diabetes mellitus type 2  Possible diabetic nephropathy  Hypertension    Plan:     Neurology consult and cardiology consult. Discussed extensively with the daughter.  PT OT Jess Thibodeaux speech therapy consult    Signed By: Chintan Mejia MD     February 22, 2022

## 2022-02-22 NOTE — CONSULTS
Consult Date: 2/22/2022    Consults Ms. Syeda Pereira is a 78year old woman with HTN, DM who had a stroke last week with right facial droop and right sided weakness worked up in [facility] who was brought in for worsening speech and left sided weakness per daughters at bedside. She is living at home and uses a walker to ambulate at baseline. Ct head done yesterday shows old basal ganglia stroke. Her BP are elevated to 317V systolic.      Subjective     Past Medical History:   Diagnosis Date    Diabetes mellitus ([address]     HTN (hypertension)     Hyperlipidemia     Neuropathy     PAD (peripheral artery disease) (HCC)     PCI    Sciatica       Past Surgical History:   Procedure Laterality Date    HX AMPUTATION Right     great toe    HX CERVICAL FUSION      HX OTHER SURGICAL Bilateral     Stent placement    HX OTHER SURGICAL      HX VASCULAR STENT Bilateral     2 in right 1 in left    HX VASCULAR STENT Bilateral      Family History   Problem Relation Age of Onset    Cancer Mother     Diabetes Mother     Hypertension Mother       Social History     Tobacco Use    Smoking status: Never Smoker    Smokeless tobacco: Never Used   Substance Use Topics    Alcohol use: Not Currently       Current Outpatient Medications   Medication Sig Dispense Refill    atorvastatin (LIPITOR) 20 mg tablet TAKE 1 TABLET BY MOUTH EVERY NIGHT 30 Tablet 5    gabapentin (NEURONTIN) 300 mg capsule TAKE 2 CAPSULES BY MOUTH THREE TIMES DAILY WITH MEALS 180 Capsule 2    Blood-Glucose Meter (OneTouch Verio Flex meter) misc Test blood glucose 3 time daily Dx Code: E11.65 1 Each 0    glucose blood VI test strips (OneTouch Verio test strips) strip Test blood glucose 3 time daily Dx Code: E11.65 300 Strip 3    lancets (OneTouch Delica Plus Lancet) 33 gauge misc Test blood glucose 3 time daily Dx Code: E11.65 300 Lancet 3    BD [place] 2nd Gen Pen Needle 32 gauge x 5/32\" ndle USE TO INJECT LANTUS AND VICTOZA DAILY 200 Pen Needle 4    insulin glargine (Lantus Solostar U-100 Insulin) 100 unit/mL (3 mL) inpn 22 Units by SubCUTAneous route daily. 30 mL 2    liraglutide (Victoza 3-Bandar) 0.6 mg/0.1 mL (18 mg/3 mL) pnij ADMINISTER 1.8 MG UNDER THE SKIN DAILY. 27 mL 3    dilTIAZem ER (CARDIZEM CD) 180 mg capsule TAKE 1 CAPSULE BY MOUTH DAILY 90 Cap 0    brimonidine (ALPHAGAN) 0.2 % ophthalmic solution Administer 1 Drop to both eyes three (3) times daily.  aspirin delayed-release 81 mg tablet Take  by mouth daily.  metFORMIN (GLUCOPHAGE) 1,000 mg tablet TAKE 1/2 TABLET BY MOUTH TWICE DAILY WITH MEALS 180 Tab 1    losartan (COZAAR) 50 mg tablet TK 2 TS PO QD IN THE EVENING  1    VICTOZA 3-BANDAR 0.6 mg/0.1 mL (18 mg/3 mL) pnij ADMINISTER 1.8 MG UNDER THE SKIN EVERY MORNING 9 mL 2    clopidogrel (PLAVIX) 75 mg tab Take 75 mg by mouth daily.  [place] PEN NEEDLE 32 gauge x 5/32\" ndle USE TO INJECT LANTUS AND VICTOZA EVERY  Pen Needle 11    ferrous sulfate 325 mg (65 mg iron) tablet Take 1 Tab by mouth two (2) times a day. 60 Tab 5    polyethylene glycol (MIRALAX) 17 gram packet Take 1 Packet by mouth daily. 30 Packet 1    oxyCODONE IR (ROXICODONE) 5 mg immediate release tablet Take 5 mg by mouth every six (6) hours as needed for Pain.  HYDROcodone-acetaminophen (NORCO) 5-325 mg per tablet Take  by mouth. Review of Systems   Neurological: Positive for facial asymmetry and weakness. All other systems reviewed and are negative. Objective     Vital signs for last 24 hours:  Visit Vitals  BP (!) 159/69   Pulse 81   Temp 98.7 °F (37.1 °C)   Resp 13   Ht 5' 7\" (1.702 m)   Wt 81.6 kg (180 lb)   SpO2 99%   BMI 28.19 kg/m²       Intake/Output this shift:  Current Shift: No intake/output data recorded. Last 3 Shifts: No intake/output data recorded.     Data Review:   Recent Results (from the past 24 hour(s))   GLUCOSE, POC    Collection Time: 02/21/22  9:21 PM   Result Value Ref Range    Glucose (POC) 168 (H) 65 - 117 mg/dL Performed by Attila Lantigua    CBC WITH AUTOMATED DIFF    Collection Time: 02/21/22  9:42 PM   Result Value Ref Range    WBC 12.9 (H) 3.6 - 11.0 K/uL    RBC 3.03 (L) 3.80 - 5.20 M/uL    HGB 8.4 (L) 11.5 - 16.0 g/dL    HCT 26.5 (L) 35.0 - 47.0 %    MCV 87.5 80.0 - 99.0 FL    MCH 27.7 26.0 - 34.0 PG    MCHC 31.7 30.0 - 36.5 g/dL    RDW 16.4 (H) 11.5 - 14.5 %    PLATELET 457 159 - 685 K/uL    MPV 10.4 8.9 - 12.9 FL    NRBC 0.0 0.0  WBC    ABSOLUTE NRBC 0.00 0.00 - 0.01 K/uL    NEUTROPHILS 72 32 - 75 %    LYMPHOCYTES 19 12 - 49 %    MONOCYTES 7 5 - 13 %    EOSINOPHILS 1 0 - 7 %    BASOPHILS 0 0 - 1 %    IMMATURE GRANULOCYTES 1 (H) 0 - 0.5 %    ABS. NEUTROPHILS 9.3 (H) 1.8 - 8.0 K/UL    ABS. LYMPHOCYTES 2.4 0.8 - 3.5 K/UL    ABS. MONOCYTES 0.9 0.0 - 1.0 K/UL    ABS. EOSINOPHILS 0.2 0.0 - 0.4 K/UL    ABS. BASOPHILS 0.0 0.0 - 0.1 K/UL    ABS. IMM. GRANS. 0.1 (H) 0.00 - 0.04 K/UL    DF AUTOMATED     METABOLIC PANEL, COMPREHENSIVE    Collection Time: 02/21/22  9:42 PM   Result Value Ref Range    Sodium 137 136 - 145 mmol/L    Potassium Hemolyzed, recollect requested 3.5 - 5.1 mmol/L    Chloride 109 (H) 97 - 108 mmol/L    CO2 20 (L) 21 - 32 mmol/L    Anion gap 8 5 - 15 mmol/L    Glucose 177 (H) 65 - 100 mg/dL    BUN 73 (H) 6 - 20 mg/dL    Creatinine 1.93 (H) 0.55 - 1.02 mg/dL    BUN/Creatinine ratio 38 (H) 12 - 20      GFR est AA 31 (L) >60 ml/min/1.73m2    GFR est non-AA 25 (L) >60 ml/min/1.73m2    Calcium 8.7 8.5 - 10.1 mg/dL    Bilirubin, total 0.8 0.2 - 1.0 mg/dL    AST (SGOT) Hemolyzed, recollect requested 15 - 37 U/L    ALT (SGPT) 36 12 - 78 U/L    Alk.  phosphatase 82 45 - 117 U/L    Protein, total 6.7 6.4 - 8.2 g/dL    Albumin 2.3 (L) 3.5 - 5.0 g/dL    Globulin 4.4 (H) 2.0 - 4.0 g/dL    A-G Ratio 0.5 (L) 1.1 - 2.2     TROPONIN-HIGH SENSITIVITY    Collection Time: 02/21/22  9:42 PM   Result Value Ref Range    Troponin-High Sensitivity 671 (HH) 0 - 51 ng/L   GLUCOSE, POC    Collection Time: 02/22/22  3:37 AM Result Value Ref Range    Glucose (POC) 154 (H) 65 - 117 mg/dL    Performed by Abel Hurd        Physical Exam    Neuro Physical Exam      General: Well developed, well nourished. Patient in no apparent distress. Cardiac: Regular rate and rhythm with no murmurs. Neurological Exam:  Mental Status: Awake, alert, oriented x4, normal speech   Cranial Nerves:   Intact visual fields. PERRL, EOM's full, no nystagmus, no ptosis. Facial sensation is normal. Right upper and lower facial droop. Palate is midline. Normal sternocleidomastoid strength. Tongue is midline. Hearing is intact bilaterally. Motor:  5/5 strength in upper ext, RLE: 2/5 hip flexion, LLE: 3/5 hip flexion   Reflexes:   Deep tendon reflexes 2+/4 and symmetrical.   Sensory:   Normal to light touch throughout   Gait:  Not tested (uses walker at baseline)   Tremor:   No tremor noted. Cerebellar:  No cerebellar signs present. Babinski:      Down on left, amputated toe on right     Assessment and Plan: Ms. Jarred Boogie is a 78year old woman with recent stroke causing right facial droop and right sided weakness per daughters and patient. She had a fill stroke workup done at [facility] last week. Will not repeat everything here. Request records from [facility]. - Mri brain w/o contrast to rule out new stroke  - BP goal < 150/90.  Started back on diltiazem, 180 mg tab not available  - cont asa 81mg, plavix 75 mg and atorvastatin 80mg  - swallow eval  - cardiac monitoring  -pt/ot

## 2022-02-22 NOTE — ED TRIAGE NOTES
Pt arrives via ems as a level 2 stroke alert. Pt found on bedside commode by family. Slurred speech per ems, no extremity weakness.

## 2022-02-22 NOTE — PROGRESS NOTES
2/22/22. Pt admitted under OBS. D/C Plan is home with family & a family member to transport pt home upon discharge. Pt uses walker/w/c. Has [facility].

## 2022-02-22 NOTE — CONSULTS
Cardiology Consult    NAME: Carlee Lee   :  1945   MRN:  [id number]     Date/Time:  2022 11:02 AM    Patient PCP: Perry Sanches MD  ________________________________________________________________________     Assessment/Plan:   Troponin leak, unremarkable EKG, patient denies any chest pain, will repeat troponin. Cardiac risk factors of hypertension, diabetes, chronic kidney disease, peripheral vascular disease. Echo at [facility] this month with normal LV function. Repeat troponin pending. Cardiac cath in view of her risk factors discussed. Patient declines    Hypertension, continue medications Cardizem, add beta-blockade as tolerated. Dyslipidemia, continue atorvastatin    CVA, Bell's palsy, neurology following    Kidney Disease,rising creatinine. []        High complexity decision making was performed        Subjective:   CHIEF COMPLAINT:     Altered mental status  REASON FOR CONSULT:    Troponin leak  HISTORY OF PRESENT ILLNESS:     Carlee Lee is a 78 y.o. BLACK/ female who was recently in the hospital [facility] with  Bell's palsy and CVA. 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 Patient also was with uncontrolled hypertension. Blood pressure is better controlled though still high. Also long history of type 2 diabetes and peripheral vascular disease with a ulcer on left big toe being treated by Dr. Lisandra Bergeron. Hypercholesterolemia, on atorvastatin. Also with known chronic kidney disease. Patient during hospitalization at [facility] valve with weakness nausea and vomiting. GI evaluation was unremarkable. Also diagnosed with nonobstructing renal calculus. Currently denies chest pain or shortness of breath. Troponin is elevated. EKG has been unremarkable. Will repeat troponin. Has been blind in right eye from age 11.       Past Medical History:   Diagnosis Date    Diabetes mellitus ([address]     HTN (hypertension)     Hyperlipidemia     Neuropathy     PAD (peripheral artery disease) (HCC)     PCI    Sciatica       Past Surgical History:   Procedure Laterality Date    HX AMPUTATION Right     great toe    HX CERVICAL FUSION      HX OTHER SURGICAL Bilateral     Stent placement    HX OTHER SURGICAL      HX VASCULAR STENT Bilateral     2 in right 1 in left    HX VASCULAR STENT Bilateral      No Known Allergies   Meds:  See below  Social History     Tobacco Use    Smoking status: Never Smoker    Smokeless tobacco: Never Used   Substance Use Topics    Alcohol use: Not Currently      Family History   Problem Relation Age of Onset    Cancer Mother     Diabetes Mother     Hypertension Mother        REVIEW OF SYSTEMS:     []         Unable to obtain  ROS due to ---   [x]         Total of 12 systems reviewed as follows:    Constitutional: negative fever, negative chills, negative weight loss  Eyes:   negative visual changes  ENT:   negative sore throat, tongue or lip swelling  Respiratory:  negative cough, negative dyspnea  Cards:  See HPI  GI:   negative for nausea, vomiting, diarrhea, and abdominal pain  Genitourinary: negative for frequency, dysuria  Integument:  negative for rash   Hematologic:  negative for easy bruising and gum/nose bleeding  Musculoskel: negative for myalgias,  back pain  Neurological:  negative for headaches, dizziness, vertigo, weakness  Behavl/Psych: negative for feelings of anxiety, depression     Pertinent Positives include :    Objective:      Physical Exam:    Last 24hrs VS reviewed since prior progress note. Most recent are:    Visit Vitals  BP (!) 159/69   Pulse 79   Temp 98.7 °F (37.1 °C)   Resp 13   Ht 5' 7\" (1.702 m)   Wt 81.6 kg (180 lb)   SpO2 99%   BMI 28.19 kg/m²     No intake or output data in the 24 hours ending 02/22/22 1102     General Appearance: Well developed, alert, no acute distress. Ears/Nose/Mouth/Throat: Pupils equal and round, Hearing grossly normal.  Neck: Supple. JVP within normal limits. Carotids good upstrokes, with no bruit. Chest: Lungs clear to auscultation bilaterally. Cardiovascular: Regular rate and rhythm, S1S2 normal, no murmur, rubs, gallops. Abdomen: Soft, non-tender, bowel sounds are active. No organomegaly. Extremities: No edema bilaterally. Femoral pulses +2, Distal Pulses +1. Skin: Warm and dry. Neuro: Alert , normal speech; follows simple commands  Psychiatric: Cooperative,    []         Post-cath site without hematoma, bruit, tenderness, or thrill. Distal pulses intact. Data:      Telemetry:    EKG:  []  No new EKG for review. Prior to Admission medications    Medication Sig Start Date End Date Taking? Authorizing Provider   atorvastatin (LIPITOR) 20 mg tablet TAKE 1 TABLET BY MOUTH EVERY NIGHT 2/3/22   Shin Rivera MD   gabapentin (NEURONTIN) 300 mg capsule TAKE 2 CAPSULES BY MOUTH THREE TIMES DAILY WITH MEALS 11/9/21   Shin Rivera MD   Blood-Glucose Meter (OneTouch Verio Flex meter) misc Test blood glucose 3 time daily Dx Code: E11.65 7/6/21   Shin Rivera MD   glucose blood VI test strips (OneTouch Verio test strips) strip Test blood glucose 3 time daily Dx Code: E11.65 7/6/21   Shin Rivera MD   lancets (OneTouch Delica Plus Lancet) 33 gauge misc Test blood glucose 3 time daily Dx Code: E11.65 7/6/21   Shin Rivera MD   BD [place] 2nd Gen Pen Needle 32 gauge x 5/32\" ndle USE TO INJECT LANTUS AND VICTOZA DAILY 7/6/21   Shin Rivera MD   insulin glargine (Lantus Solostar U-100 Insulin) 100 unit/mL (3 mL) inpn 22 Units by SubCUTAneous route daily. 6/18/21   MutJohnnie reyes MD   liraglutide (Victoza 3-Sheldon) 0.6 mg/0.1 mL (18 mg/3 mL) pnij ADMINISTER 1.8 MG UNDER THE SKIN DAILY. 6/18/21   Shin Rivera MD   dilTIAZem ER (CARDIZEM CD) 180 mg capsule TAKE 1 CAPSULE BY MOUTH DAILY 6/24/20   Shin Rivera MD   brimonidine (ALPHAGAN) 0.2 % ophthalmic solution Administer 1 Drop to both eyes three (3) times daily. Provider, Historical   aspirin delayed-release 81 mg tablet Take  by mouth daily. Provider, Historical   metFORMIN (GLUCOPHAGE) 1,000 mg tablet TAKE 1/2 TABLET BY MOUTH TWICE DAILY WITH MEALS 3/23/20   Nacho Perry MD   losartan (COZAAR) 50 mg tablet TK 2 TS PO QD IN THE EVENING 8/27/19   Provider, Historical   VICTOZA 3-BANDAR 0.6 mg/0.1 mL (18 mg/3 mL) pnij ADMINISTER 1.8 MG UNDER THE SKIN EVERY MORNING 4/29/19   Nacho Perry MD   clopidogrel (PLAVIX) 75 mg tab Take 75 mg by mouth daily. Provider, Historical   CHELY PEN NEEDLE 32 gauge x 5/32\" ndle USE TO INJECT LANTUS AND VICTOZA EVERY DAY 3/10/18   Nacho Perry MD   ferrous sulfate 325 mg (65 mg iron) tablet Take 1 Tab by mouth two (2) times a day. 12/19/17   Nacho Perry MD   polyethylene glycol (MIRALAX) 17 gram packet Take 1 Packet by mouth daily. 7/18/16   Sondra Nickerson MD   oxyCODONE IR (ROXICODONE) 5 mg immediate release tablet Take 5 mg by mouth every six (6) hours as needed for Pain. Provider, Historical   HYDROcodone-acetaminophen (NORCO) 5-325 mg per tablet Take  by mouth. Provider, Historical       Recent Results (from the past 24 hour(s))   GLUCOSE, POC    Collection Time: 02/21/22  9:21 PM   Result Value Ref Range    Glucose (POC) 168 (H) 65 - 117 mg/dL    Performed by Filomena Foley    CBC WITH AUTOMATED DIFF    Collection Time: 02/21/22  9:42 PM   Result Value Ref Range    WBC 12.9 (H) 3.6 - 11.0 K/uL    RBC 3.03 (L) 3.80 - 5.20 M/uL    HGB 8.4 (L) 11.5 - 16.0 g/dL    HCT 26.5 (L) 35.0 - 47.0 %    MCV 87.5 80.0 - 99.0 FL    MCH 27.7 26.0 - 34.0 PG    MCHC 31.7 30.0 - 36.5 g/dL    RDW 16.4 (H) 11.5 - 14.5 %    PLATELET 806 244 - 661 K/uL    MPV 10.4 8.9 - 12.9 FL    NRBC 0.0 0.0  WBC    ABSOLUTE NRBC 0.00 0.00 - 0.01 K/uL    NEUTROPHILS 72 32 - 75 %    LYMPHOCYTES 19 12 - 49 %    MONOCYTES 7 5 - 13 %    EOSINOPHILS 1 0 - 7 %    BASOPHILS 0 0 - 1 %    IMMATURE GRANULOCYTES 1 (H) 0 - 0.5 %    ABS. NEUTROPHILS 9.3 (H) 1.8 - 8.0 K/UL    ABS. LYMPHOCYTES 2.4 0.8 - 3.5 K/UL    ABS. MONOCYTES 0.9 0.0 - 1.0 K/UL    ABS. EOSINOPHILS 0.2 0.0 - 0.4 K/UL    ABS. BASOPHILS 0.0 0.0 - 0.1 K/UL    ABS. IMM. GRANS. 0.1 (H) 0.00 - 0.04 K/UL    DF AUTOMATED     METABOLIC PANEL, COMPREHENSIVE    Collection Time: 02/21/22  9:42 PM   Result Value Ref Range    Sodium 137 136 - 145 mmol/L    Potassium Hemolyzed, recollect requested 3.5 - 5.1 mmol/L    Chloride 109 (H) 97 - 108 mmol/L    CO2 20 (L) 21 - 32 mmol/L    Anion gap 8 5 - 15 mmol/L    Glucose 177 (H) 65 - 100 mg/dL    BUN 73 (H) 6 - 20 mg/dL    Creatinine 1.93 (H) 0.55 - 1.02 mg/dL    BUN/Creatinine ratio 38 (H) 12 - 20      GFR est AA 31 (L) >60 ml/min/1.73m2    GFR est non-AA 25 (L) >60 ml/min/1.73m2    Calcium 8.7 8.5 - 10.1 mg/dL    Bilirubin, total 0.8 0.2 - 1.0 mg/dL    AST (SGOT) Hemolyzed, recollect requested 15 - 37 U/L    ALT (SGPT) 36 12 - 78 U/L    Alk.  phosphatase 82 45 - 117 U/L    Protein, total 6.7 6.4 - 8.2 g/dL    Albumin 2.3 (L) 3.5 - 5.0 g/dL    Globulin 4.4 (H) 2.0 - 4.0 g/dL    A-G Ratio 0.5 (L) 1.1 - 2.2     TROPONIN-HIGH SENSITIVITY    Collection Time: 02/21/22  9:42 PM   Result Value Ref Range    Troponin-High Sensitivity 671 (HH) 0 - 51 ng/L   GLUCOSE, POC    Collection Time: 02/22/22  3:37 AM   Result Value Ref Range    Glucose (POC) 154 (H) 65 - 117 mg/dL    Performed by Mamadou Mendez    EKG, 12 LEAD, INITIAL    Collection Time: 02/22/22 10:24 AM   Result Value Ref Range    Ventricular Rate 81 BPM    Atrial Rate 81 BPM    P-R Interval 162 ms    QRS Duration 100 ms    Q-T Interval 412 ms    QTC Calculation (Bezet) 478 ms    Calculated P Axis 38 degrees    Calculated R Axis 39 degrees    Calculated T Axis 45 degrees    Diagnosis       Normal sinus rhythm  Nonspecific T wave abnormality  Prolonged QT  Abnormal ECG  When compared with ECG of 21-FEB-2022 21:39, (Unconfirmed)  No significant change was found  Confirmed by Martha Lerner MD, Omar Wellington ([id number]) on 2/22/2022 10:46:19 AM     GLUCOSE, POC    Collection Time: 02/22/22 10:45 AM   Result Value Ref Range    Glucose (POC) 173 (H) 65 - 117 mg/dL    Performed by Nadeem Poole MD

## 2022-02-22 NOTE — PROGRESS NOTES
Patient found with new onset of confusion. States: \" I dont know where I am or where my family is\" Patient has increased right sided weakness, slurred speech and left side facial drooping. Tete Gull and confusion. Called family. Family now at bedside. Code stroke called.  Transported to CT 1

## 2022-02-22 NOTE — PROGRESS NOTES
SPEECH LANGUAGE PATHOLOGY BEDSIDE SWALLOW EVALUATIONS  Patient: Jerilynn Krabbe  (86 y.o. )  Date: 2/22/2022  Primary Diagnosis: CVA, elevated troponin   Precautions:  Fall    ASSESSMENT :  Patient is A&Ox4 and follows basic commands. R upper and lower facial droop(flaccid) w/ mild dysarthria. Per family dx w/ bell's palsy last week. Patient reports word finding deficits. Patient presents w/ mild oral dysphagia. Oral phase c/b reduced labial seal, reduced R buccal strength, slow but adequate mastication. Pharyngeal phase c/b timely swallow initiation and HLE is wfl upon palpation. No overt s/s of pen/asp observed. Patient will benefit from skilled intervention to address the above impairments. Patients rehabilitation potential is considered to be Good     PLAN :  Recommendations and Planned Interventions:  Rec easy to chew w/ thin liquids and strict asp/GERD precautions. MBS as indicated. Rec speech language evaluation. Frequency/Duration: Patient will be followed by speech-language pathology 5 times a week to address goals. Discharge Recommendations: Home Health     SUBJECTIVE:   Patient denies dysphagia reports she is not interested in IP rehab and will be agreeable to return w/ [facility] services. OBJECTIVE:   Patient admitted following unresponsive event and increased slurred speech. Per patient and family d/c'd last week from another facility for CVA and bell's palsy wasn't able to tolerate steroid s/t elevated blood sugar. Past Medical History:   Diagnosis Date    Diabetes mellitus ([facility] [address]     HTN (hypertension)     Hyperlipidemia     Neuropathy     PAD (peripheral artery disease) (HCC)     PCI    Sciatica      CT CODE NEURO HEAD WO CONTRAST   Final Result      1. No acute intracranial hemorrhage. 2. Left basal ganglia lacunar infarct, which is age indeterminate in the absence   of any prior outside studies, but favored to be chronic. 3. Moderate to severe paranasal sinus disease.       Results called to Dr. Ave Blackburn at 9:39 PM on 2/21/2022      MRI BRAIN WO CONT    (Results Pending)           Diet prior to admission: Regular  Current Diet:  DIET ADULT     Cognitive and Communication Status:  Neurologic State: Alert  Orientation Level: Oriented X4  Cognition: Decreased attention/concentration,Follows commands  Perception: Appears intact  Perseveration: No perseveration noted  Safety/Judgement: Decreased insight into deficits  Swallowing Evaluation:   Oral Assessment:  Oral Assessment  Labial: Right droop  Dentition: Intact  Oral Hygiene: wfl  Lingual: No impairment  Velum: No impairment  Mandible: No impairment  P.O. Trials:  Patient Position: upright in stretcher  Vocal quality prior to P.O.: Hoarse  Consistency Presented: Puree; Solid; Thin liquid  How Presented: Self-fed/presented;Spoon;Straw;Successive swallows     Bolus Acceptance: No impairment  Bolus Formation/Control: Impaired  Type of Impairment: Lip closure  Propulsion: Delayed (# of seconds)  Oral Residue: None  Initiation of Swallow: No impairment  Laryngeal Elevation: Functional  Aspiration Signs/Symptoms: None  Pharyngeal Phase Characteristics: No impairment, issues, or problems      Oral Phase Severity: Mild  Pharyngeal Phase Severity : Minimal    Voice:  Vocal Quality: Hoarse       Pain:   0      After treatment:   Patient left in no apparent distress in bed, Call bell within reach, and Nursing notified    COMMUNICATION/EDUCATION:   Patient was educated regarding diet recs, s/s aspiration, aspiration precautions, BEFAST, and ST POC. She demonstrated Fair understanding as evidenced by reduced engagement. The patient's plan of care including recommendations, planned interventions, and recommended diet changes were discussed with: Registered nurse. Patient/family have participated as able in goal setting and plan of care. Patient/family agree to work toward stated goals and plan of care.     Thank you for this referral.  Ncik Morrison, Camilo POWELL., CCC-SLP  Time Calculation: 27 mins

## 2022-02-22 NOTE — ED PROVIDER NOTES
EMERGENCY DEPARTMENT HISTORY AND PHYSICAL EXAM      Date: 2/21/2022  Patient Name: Pravin Adams      History of Presenting Illness     Chief Complaint   Patient presents with    Extremity Weakness       History Provided By: Patient and EMS    HPI: Pravin Adams, 78 y.o. female with a past medical history significant diabetes, hypertension and stroke presents to the ED with cc of slurred speech per EMS. EMS states they were called for patient being found unresponsive on bedside commode. EMS states family reports history of stroke last week. Additional history limited due to patient being a poor historian and lack of corroborating medical history. There are no other complaints, changes, or physical findings at this time. PCP: Christiane Moritz., MD    Current Outpatient Medications   Medication Sig Dispense Refill    atorvastatin (LIPITOR) 20 mg tablet TAKE 1 TABLET BY MOUTH EVERY NIGHT 30 Tablet 5    gabapentin (NEURONTIN) 300 mg capsule TAKE 2 CAPSULES BY MOUTH THREE TIMES DAILY WITH MEALS 180 Capsule 2    Blood-Glucose Meter (OneTouch Verio Flex meter) misc Test blood glucose 3 time daily Dx Code: E11.65 1 Each 0    glucose blood VI test strips (OneTouch Verio test strips) strip Test blood glucose 3 time daily Dx Code: E11.65 300 Strip 3    lancets (OneTouch Delica Plus Lancet) 33 gauge misc Test blood glucose 3 time daily Dx Code: E11.65 300 Lancet 3    BD [place] 2nd Gen Pen Needle 32 gauge x 5/32\" ndle USE TO INJECT LANTUS AND VICTOZA DAILY 200 Pen Needle 4    insulin glargine (Lantus Solostar U-100 Insulin) 100 unit/mL (3 mL) inpn 22 Units by SubCUTAneous route daily. 30 mL 2    liraglutide (Victoza 3-Sheldon) 0.6 mg/0.1 mL (18 mg/3 mL) pnij ADMINISTER 1.8 MG UNDER THE SKIN DAILY. 27 mL 3    dilTIAZem ER (CARDIZEM CD) 180 mg capsule TAKE 1 CAPSULE BY MOUTH DAILY 90 Cap 0    brimonidine (ALPHAGAN) 0.2 % ophthalmic solution Administer 1 Drop to both eyes three (3) times daily.       aspirin delayed-release 81 mg tablet Take  by mouth daily.  metFORMIN (GLUCOPHAGE) 1,000 mg tablet TAKE 1/2 TABLET BY MOUTH TWICE DAILY WITH MEALS 180 Tab 1    losartan (COZAAR) 50 mg tablet TK 2 TS PO QD IN THE EVENING  1    VICTOZA 3-BANDAR 0.6 mg/0.1 mL (18 mg/3 mL) pnij ADMINISTER 1.8 MG UNDER THE SKIN EVERY MORNING 9 mL 2    clopidogrel (PLAVIX) 75 mg tab Take 75 mg by mouth daily.  CHELY PEN NEEDLE 32 gauge x 5/32\" ndle USE TO INJECT LANTUS AND VICTOZA EVERY  Pen Needle 11    ferrous sulfate 325 mg (65 mg iron) tablet Take 1 Tab by mouth two (2) times a day. 60 Tab 5    polyethylene glycol (MIRALAX) 17 gram packet Take 1 Packet by mouth daily. 30 Packet 1    oxyCODONE IR (ROXICODONE) 5 mg immediate release tablet Take 5 mg by mouth every six (6) hours as needed for Pain.  HYDROcodone-acetaminophen (NORCO) 5-325 mg per tablet Take  by mouth. Past History     Past Medical History:  Past Medical History:   Diagnosis Date    Diabetes mellitus ([address]     HTN (hypertension)     Hyperlipidemia     Neuropathy     PAD (peripheral artery disease) (HCC)     PCI    Sciatica        Past Surgical History:  Past Surgical History:   Procedure Laterality Date    HX AMPUTATION Right     great toe    HX CERVICAL FUSION      HX OTHER SURGICAL Bilateral     Stent placement    HX OTHER SURGICAL      HX VASCULAR STENT Bilateral     2 in right 1 in left    HX VASCULAR STENT Bilateral        Family History:  Family History   Problem Relation Age of Onset    Cancer Mother     Diabetes Mother     Hypertension Mother        Social History:  Social History     Tobacco Use    Smoking status: Never Smoker    Smokeless tobacco: Never Used   Substance Use Topics    Alcohol use: Not Currently    Drug use: No       Allergies:  No Known Allergies      Review of Systems   Unable to obtain complete review of systems due to patient's poor history.   Review of Systems    Physical Exam   Physical Exam  Constitutional:       General: No acute distress. Appearance: Normal appearance. Not toxic-appearing. HENT:      Head: Normocephalic and atraumatic. Nose: Nose normal.      Mouth/Throat:      Mouth: Mucous membranes are moist.   Eyes:      Extraocular Movements: Extraocular movements intact. Pupils: Pupils are equal, round, and reactive to light. Cardiovascular:      Rate and Rhythm: Normal rate. Pulses: Normal pulses. Pulmonary:      Effort: Pulmonary effort is normal.      Breath sounds: No stridor. Abdominal:      General: Abdomen is flat. There is no distension. Musculoskeletal:         General: Normal range of motion. Cervical back: Normal range of motion and neck supple. Skin:     General: Skin is warm and dry. Capillary Refill: Capillary refill takes less than 2 seconds. Neurological:      General: No focal deficit present. Incomplete closure of right thigh, which patient states is baseline for her. Otherwise cranial nerves II through XII intact. Mental Status: Alert and oriented to person, place, and time.    Psychiatric:         Mood and Affect: Mood normal.         Behavior: Behavior normal.     Physical Exam    Lab and Diagnostic Study Results     Labs -     Recent Results (from the past 12 hour(s))   GLUCOSE, POC    Collection Time: 02/21/22  9:21 PM   Result Value Ref Range    Glucose (POC) 168 (H) 65 - 117 mg/dL    Performed by Cat Berumen    CBC WITH AUTOMATED DIFF    Collection Time: 02/21/22  9:42 PM   Result Value Ref Range    WBC 12.9 (H) 3.6 - 11.0 K/uL    RBC 3.03 (L) 3.80 - 5.20 M/uL    HGB 8.4 (L) 11.5 - 16.0 g/dL    HCT 26.5 (L) 35.0 - 47.0 %    MCV 87.5 80.0 - 99.0 FL    MCH 27.7 26.0 - 34.0 PG    MCHC 31.7 30.0 - 36.5 g/dL    RDW 16.4 (H) 11.5 - 14.5 %    PLATELET 025 647 - 536 K/uL    MPV 10.4 8.9 - 12.9 FL    NRBC 0.0 0.0  WBC    ABSOLUTE NRBC 0.00 0.00 - 0.01 K/uL    NEUTROPHILS 72 32 - 75 %    LYMPHOCYTES 19 12 - 49 % MONOCYTES 7 5 - 13 %    EOSINOPHILS 1 0 - 7 %    BASOPHILS 0 0 - 1 %    IMMATURE GRANULOCYTES 1 (H) 0 - 0.5 %    ABS. NEUTROPHILS 9.3 (H) 1.8 - 8.0 K/UL    ABS. LYMPHOCYTES 2.4 0.8 - 3.5 K/UL    ABS. MONOCYTES 0.9 0.0 - 1.0 K/UL    ABS. EOSINOPHILS 0.2 0.0 - 0.4 K/UL    ABS. BASOPHILS 0.0 0.0 - 0.1 K/UL    ABS. IMM. GRANS. 0.1 (H) 0.00 - 0.04 K/UL    DF AUTOMATED     METABOLIC PANEL, COMPREHENSIVE    Collection Time: 02/21/22  9:42 PM   Result Value Ref Range    Sodium 137 136 - 145 mmol/L    Potassium Hemolyzed, recollect requested 3.5 - 5.1 mmol/L    Chloride 109 (H) 97 - 108 mmol/L    CO2 20 (L) 21 - 32 mmol/L    Anion gap 8 5 - 15 mmol/L    Glucose 177 (H) 65 - 100 mg/dL    BUN 73 (H) 6 - 20 mg/dL    Creatinine 1.93 (H) 0.55 - 1.02 mg/dL    BUN/Creatinine ratio 38 (H) 12 - 20      GFR est AA 31 (L) >60 ml/min/1.73m2    GFR est non-AA 25 (L) >60 ml/min/1.73m2    Calcium 8.7 8.5 - 10.1 mg/dL    Bilirubin, total 0.8 0.2 - 1.0 mg/dL    AST (SGOT) Hemolyzed, recollect requested 15 - 37 U/L    ALT (SGPT) 36 12 - 78 U/L    Alk. phosphatase 82 45 - 117 U/L    Protein, total 6.7 6.4 - 8.2 g/dL    Albumin 2.3 (L) 3.5 - 5.0 g/dL    Globulin 4.4 (H) 2.0 - 4.0 g/dL    A-G Ratio 0.5 (L) 1.1 - 2.2     TROPONIN-HIGH SENSITIVITY    Collection Time: 02/21/22  9:42 PM   Result Value Ref Range    Troponin-High Sensitivity 671 (HH) 0 - 51 ng/L       Radiologic Studies -   [unfilled]  CT Results  (Last 48 hours)               02/21/22 2126  CT CODE NEURO HEAD WO CONTRAST Final result    Impression:      1. No acute intracranial hemorrhage. 2. Left basal ganglia lacunar infarct, which is age indeterminate in the absence   of any prior outside studies, but favored to be chronic. 3. Moderate to severe paranasal sinus disease.        Results called to Dr. Noble Romberg at 9:39 PM on 2/21/2022       Narrative:  EXAMINATION: Computed tomography of the head without contrast.       TECHNIQUE: Routine axial images were obtained through the brain without the use   of IV contrast. Sagittal and coronal reformatted images were performed at the CT   console. Dose Reduction: All CT scans at this facility are performed using dose reduction   optimization techniques as appropriate to a performed exam including the   following: Automated exposure control, adjustments of the mA and/or kV according   to patient size, or use of iterative reconstruction technique. COMPARISON: None available       FINDINGS:    There is no acute intracranial hemorrhage. There is a lacunar infarct in the   left basal ganglia. No definite CT findings of acute vascular territorial   distribution infarction. Incidental calcifications are present in the basal   ganglia. Ventricles are normal in size and morphology. There is no mass effect   or midline shift. There is complete opacification of the maxillary sinuses. There is near complete opacification of the right frontal sinus. There is   moderate mucosal thickening of the ethmoid sinuses. Mastoid air cells are   predominantly clear. There is no acute calvarial fracture. CXR Results  (Last 48 hours)    None          Medical Decision Making and ED Course   - I am the first and primary provider for this patient AND AM THE PRIMARY PROVIDER OF RECORD. - I reviewed the vital signs, available nursing notes, past medical history, past surgical history, family history and social history. - Initial assessment performed. The patients presenting problems have been discussed, and the staff are in agreement with the care plan formulated and outlined with them. I have encouraged them to ask questions as they arise throughout their visit. Vital Signs-Reviewed the patient's vital signs.     Patient Vitals for the past 12 hrs:   Temp Pulse Resp BP SpO2   02/21/22 2230 -- 73 20 (!) 148/57 99 %   02/21/22 2133 98.7 °F (37.1 °C) 79 20 137/79 99 %     ED Course:               ED from 2/21/2022 in [address] [place] [address] EMERGENCY DEPT 2/21/22 2138     NIH Stroke Scale       Interval Baseline   Level of Conciousness (1a) Alert   LOC Questions (1b) Answers both questions correctly   LOC Commands (1c) Performs both tasks correctly   Best Gaze (2) Partial gaze palsyBest Gaze (2). Partial gaze palsy. Data is abnormal. Taken on 2/21/22 2138   Visual (3) Complete hemianopiaVisual (3). Complete hemianopia. Data is abnormal. Taken on 2/21/22 2138   Facial Palsy (4) Partial paralysisFacial Palsy (4). Partial paralysis. Data is abnormal. Taken on 2/21/22 2138   Motor Arm, Left (5a) No drift   Motor Arm, Right (5b) Drift, but does not hit bedMotor Arm, Right (5b). Drift, but does not hit bed. Data is abnormal. Taken on 2/21/22 2138   Motor Leg, Left (6a) No drift   Motor Leg, Right (6b) No drift   Limb Ataxia (7) Absent   Sensory (8) Normal   Best Language (9) No aphasia   Dysarthria (10) Normal   Extinction and Inattention (11) No abnormality   Total 6         ED Course as of 02/21/22 2310   Mon Feb 21, 2022 2244 Discussed case with family who is now present. He states she did have a stroke recently was left with some mild residual left-sided weakness, not right-sided weakness. They state patient was diagnosed with a Bell's palsy. Discussed the findings concerning for stroke given her facial symptoms and findings on CT.   Will admit at this time for continued work-up and monitoring. [CS]      ED Course User Index  [CS] Jose Luis Moreno MD           Procedures and Critical Care           CRITICAL CARE NOTE :  9:35 PM  Amount of Critical Care Time: 35(minutes)    IMPENDING DETERIORATION -CNS  ASSOCIATED RISK FACTORS - CNS Decompensation  MANAGEMENT- Bedside Assessment, Supervision of Care and admission  INTERPRETATION -  CT Scan  INTERVENTIONS -   CASE REVIEW - Hospitalist/Intensivist  TREATMENT RESPONSE -Stable  PERFORMED BY - Self    NOTES   :  I have spent critical care time involved in lab review, consultations with specialist, family decision- making, bedside attention and documentation. This time excludes time spent in any separate billed procedures. During this entire length of time I was immediately available to the patient . Evita Caballero MD        Disposition     Disposition: Admitted to Floor Medical Floor the case was discussed with the admitting physician        Diagnosis     Clinical Impression:   1. Facial weakness    2. Chronic renal impairment, unspecified CKD stage    3. Elevated troponin        Attestations:    Evita Caballero MD    Please note that this dictation was completed with [employer], the computer voice recognition software. Quite often unanticipated grammatical, syntax, homophones, and other interpretive errors are inadvertently transcribed by the computer software. Please disregard these errors. Please excuse any errors that have escaped final proofreading. Thank you.

## 2022-02-22 NOTE — ED NOTES
TRANSFER - OUT REPORT:    Verbal report given to Shilo(name) on Gene Mahad  being transferred to (unit) for routine progression of care       Report consisted of patients Situation, Background, Assessment and   Recommendations(SBAR). Information from the following report(s) SBAR, Kardex, ED Summary, Procedure Summary, MAR, Accordion and Recent Results was reviewed with the receiving nurse. Lines:       Opportunity for questions and clarification was provided.       Patient transported with:   Monitor  Tech

## 2022-02-23 NOTE — PROGRESS NOTES
Notified by RN, patient NPO s/t stroke alert on 2/22/21 0639. Bedside swallow re-evaluation performed w/ recs for mince and moist w/ thin liquids and strict asp/GERD precautions and meds in applesauce. Rec MBS to further assess oropharyngeal phase of swallow. Rec IRF. Full report to follow.

## 2022-02-23 NOTE — PROGRESS NOTES
Paged Dr. Florin Zepeda at [phone] regarding her order clarification for CTA without contrast. Per CT department, patient's labs abnormal and were unable to reach Dr. Florin Zepeda. Patient had recently had a head CT without contrast and another CT was not needed at this time without contrast. Per CT department, patient will need to have clearance from Dr. Florin Zepeda in order to proceed with CT with contrast. Upon speaking to Dr. Florin Zepeda, she stated , \"I did not order that test\", \"you need to call [facility] because they ordered it\", \"I'm not doing anything about it\". The order was reviewed and it stated that Dr. Florin Zepeda was the authorizing physician for the CTA without contrast order. Approximately [id number], the transporter for the CT department arrived to the floor and  stated that Dr. Florin Zepeda spoke with them and ordered the CTA. The CT department called the floor and stated that the patient needed a different IV because Dr. Florin Zepeda placed the order for contrast to be done.

## 2022-02-23 NOTE — PROGRESS NOTES
SPEECH PATHOLOGY DYSPHAGIA TREATMENT AND MODIFIED BARIUM SWALLOW STUDY  Patient: Jerilynn Krabbe (4 y.o. female)  Date: 2/23/2022  Primary Diagnosis: CVA (cerebral vascular accident) ([facility] [address] [I63.9]  Elevated troponin [R77.8]        Precautions: Fall, aspiration       [place] :  [facility] [id number]: Based on the objective data described below, the patient presents with mild to moderate oropharyngeal dysphagia. Oral phase c/b reduced R buccal strength, reduced labial seal, and reduced lingual strength and ROM resulting in anterior and R lateral spillage w/ mild reduction in bolus formation and manipulation most notable w/ solids, decreased mastication, mild oral residue that increases w/ thicker consistencies and reduced A-P transit. Improved control w/ straw use of thin. Premature spillage to the level of the pyriforms w/ thin liquids. Remaining consistencies initiate at the level of the vallecular. Overall, mild swallow delay appreciated. Mild reduction in BOT retraction, HLE, epiglottic inversion and pharyngeal constriction. Weight of bolus does not improve inversion  Cervical hardware noted from previous ACDF. Trace flash penetration x1 w/ sequential straw, no subsequent aspiration. No pen/asp w/ remaining consistencies. Mild pharyngeal residue (vallecula>pyriforms) that increases w/ thicker consistencies. Reduced relaxation and duration of relaxation of UES. Swallow tx: [id number]: Notified by RN, patient NPO s/t stroke alert on 2/22/21 0639. Bedside swallow re-evaluation performed w/ recs for mince and moist w/ thin liquids and strict asp/GERD precautions and meds in applesauce. Patient continues w/ significant R facial droop, however decrease in lingual strength and ROM w/ sensory deficits new from evaluation. Decreased mastication of solids w/ increased A-P. Concerns for worsening dysphagia Plan for MBS to r/o silent aspiration today. MBS completed at 1140.      Patient will benefit from skilled intervention to address the above impairments. Patients rehabilitation potential is considered to be Fair     PLAN :  Recommendations and Planned Interventions:  Rec mince and moist w/ thin liquids and strict asp/GERD precautions. Rec slow rate of intake, small bites/sips, alternate liquids and solids, double swallow and clear oral cavity b/w bites. Rec speech language evaluation. Frequency/Duration: Patient will be followed by speech-language pathology 5 times a week to address goals. Discharge Recommendations: SNF vs IRF     SUBJECTIVE:   Patient agitated but cooperative MBS. OBJECTIVE:     Past Medical History:   Diagnosis Date    Diabetes mellitus ([facility] [address]     HTN (hypertension)     Hyperlipidemia     Neuropathy     PAD (peripheral artery disease) (HCC)     PCI    Sciatica      Past Surgical History:   Procedure Laterality Date    HX AMPUTATION Right     great toe    HX CERVICAL FUSION      HX OTHER SURGICAL Bilateral     Stent placement    HX OTHER SURGICAL      HX VASCULAR STENT Bilateral     2 in right 1 in left    HX VASCULAR STENT Bilateral         CXR Results  (Last 48 hours)      None            Radiologist: BRIANNE Kenny  Film Views: Lateral  Patient Position: upright in chair    Video Flouroscopic Procedures  [x] Lateral View   [] A-P View [] Scanned to level of Sternum    [] Seated at 90 deg.   [] Other:    Presentation:   [x] Spoon   [x] Cup   [x] Straw   [] Syringe   [x] Consecutive Swallows  [] Other:    Consistencies:   [x] Ba+ liquid   [] Ba+ liquid (nectar)   [] Ba+ liquid (honey)     [x] Ba+ pudding   [x] Ba+ crunched cookie   [] Ba+ cookie   [] Other:         Decreased Tongue Base Retraction?: Yes  Laryngeal Elevation: Reduced excursion with laryngeal vestibule gap  Aspiration/Penetration Score: 2 (Penetration/No residue-Contrast enters the airway penetrates, remains above the folds/cords, and is cleared)     Pharyngeal-Esophageal Segment: Decreased relaxation of upper esophageal segment  Pharyngeal Dysfunction: Crico-pharyngeal dysfunction;Decreased tongue base retraction;Decreased strength;Decreased elevation/closure;Decreased pharyngeal wall constriction    Oral Phase Severity: Moderate  Pharyngeal Phase Severity: Mild      COMMUNICATION/EDUCATION:   Patient receptive of education regarding MBS results, diet recs, s/s aspiration and aspiration precautions. She  demonstrated Fair understanding as evidenced by reduced engagement  . The patients plan of care including findings from [address], recommendations, planned interventions, and recommended diet changes were discussed with: Registered nurse.          Thank you for this referral.  James Conway M.S., M.Ed., CCC-SLP  Time Calculation: 21 mins (swallow treatmetn)  Time Calculation: 17 mins (MBS)

## 2022-02-23 NOTE — PROGRESS NOTES
Progress Note    Patient: Carlee Lee MRN: [id number]  SSN: [id number]    YOB: 1945  Age: 78 y.o. Sex: female      Admit Date: 2/21/2022    LOS: 0 days     Subjective:     78years old patient admitted initially for acute CVA had a change in mentation yesterday and a CTA of the neck was done which was negative. Patient had an MRI done which showed   IMPRESSION  Acute infarction left paracentral lobule and region of the white  matter of the left motor cortex. Objective:     Vitals:    02/23/22 0115 02/23/22 0534 02/23/22 0805 02/23/22 1105   BP: (!) 151/76 (!) 156/64 (!) 161/72 (!) 151/69   Pulse: 97 97 97 89   Resp: 18  17 14   Temp: 98.3 °F (36.8 °C) 98.6 °F (37 °C) 98.1 °F (36.7 °C) 98.1 °F (36.7 °C)   SpO2: 99% 99% 98% 99%   Weight:       Height:            Intake and Output:  Current Shift: No intake/output data recorded. Last three shifts: 02/21 1901 - 02/23 0700  In: -   Out: 1100 [Urine:1100]    Physical Exam:   General:  Alert, cooperative, no distress, appears stated age. Eyes:  Conjunctivae/corneas clear. PERRL, EOMs intact. Fundi benign   Ears:  Normal TMs and external ear canals both ears. Nose: Nares normal. Septum midline. Mucosa normal. No drainage or sinus tenderness. Mouth/Throat: Lips, mucosa, and tongue normal. Teeth and gums normal.   Neck: Supple, symmetrical, trachea midline, no adenopathy, thyroid: no enlargment/tenderness/nodules, no carotid bruit and no JVD. Back:   Symmetric, no curvature. ROM normal. No CVA tenderness. Lungs:   Clear to auscultation bilaterally. Heart:  Regular rate and rhythm, S1, S2 normal, no murmur, click, rub or gallop. Abdomen:   Soft, non-tender. Bowel sounds normal. No masses,  No organomegaly. Extremities: Extremities weakness right upper and lower extremities, atraumatic, no cyanosis or edema. Pulses: 2+ and symmetric all extremities.    Skin: Skin color, texture, turgor normal. No rashes or lesions   Lymph nodes: Cervical, supraclavicular, and axillary nodes normal.   Neurologic: CNII-XII intact. Weakness on the right upper and lower extremities. Lab/Data Review: All lab results for the last 24 hours reviewed.      Recent Results (from the past 24 hour(s))   EKG, 12 LEAD, INITIAL    Collection Time: 02/22/22  2:33 PM   Result Value Ref Range    Ventricular Rate 77 BPM    Atrial Rate 77 BPM    P-R Interval 160 ms    QRS Duration 98 ms    Q-T Interval 426 ms    QTC Calculation (Bezet) 482 ms    Calculated P Axis 60 degrees    Calculated R Axis 38 degrees    Calculated T Axis 53 degrees    Diagnosis       Normal sinus rhythm  Minimal voltage criteria for LVH, may be normal variant  Nonspecific T wave abnormality  Prolonged QT  Abnormal ECG  When compared with ECG of 22-FEB-2022 10:24,  No significant change was found  Confirmed by SANKET Garland MD ([id number]) on 2/22/2022 3:01:09 PM     EKG, 12 LEAD, INITIAL    Collection Time: 02/22/22  5:53 PM   Result Value Ref Range    Ventricular Rate 76 BPM    Atrial Rate 76 BPM    P-R Interval 144 ms    QRS Duration 94 ms    Q-T Interval 422 ms    QTC Calculation (Bezet) 474 ms    Calculated P Axis 46 degrees    Calculated R Axis 37 degrees    Calculated T Axis 68 degrees    Diagnosis       Normal sinus rhythm  Nonspecific T wave abnormality  Prolonged QT  Abnormal ECG  When compared with ECG of 22-FEB-2022 14:33,  Nonspecific T wave abnormality, improved in Inferior leads  Nonspecific T wave abnormality, improved in Lateral leads  Confirmed by ARIK SMITH REMY ([id number]) on 2/22/2022 8:24:56 PM     GLUCOSE, POC    Collection Time: 02/22/22  6:39 PM   Result Value Ref Range    Glucose (POC) 130 (H) 65 - 117 mg/dL    Performed by Bhupinder Alicia, POC    Collection Time: 02/22/22  8:52 PM   Result Value Ref Range    Glucose (POC) 166 (H) 65 - 117 mg/dL    Performed by Franklyn Dunn    GLUCOSE, POC    Collection Time: 02/23/22  1:08 AM   Result Value Ref Range    Glucose (POC) 176 (H) 65 - 117 mg/dL    Performed by Karine Bird    GLUCOSE, POC    Collection Time: 02/23/22  5:39 AM   Result Value Ref Range    Glucose (POC) 176 (H) 65 - 117 mg/dL    Performed by Humberto Buckley    GLUCOSE, POC    Collection Time: 02/23/22 11:04 AM   Result Value Ref Range    Glucose (POC) 230 (H) 65 - 117 mg/dL    Performed by Ravi Terrell (Trvlr)          Assessment:     Active Problems:    CVA (cerebral vascular accident) ([address] (2/21/2022)      Elevated troponin (2/21/2022)    Acute CVA with infarct neurology is following discontinue Plavix and start the patient on Xarelto  From neurologist notes sent I had JUS done at [facility] was normal EF of 60%. Plan:     Xarelto  Patient has been evaluated by speech therapy placed on dysphagia diet with modified barium swallow to be done tomorrow.   Patient is antihypertensives with as needed labetalol  PT and OT therapy and notes reviewed  I discussed extensively with the daughter by the bedside and encouraged her to be in touch with the neurologist for further clarification of patient status and diagnostic tests    Signed By: Chelsi Mark MD     February 23, 2022

## 2022-02-23 NOTE — PROGRESS NOTES
Patient was assigned to me at the beginning of the shift but because I did not feel comfortable taking care a patient who had just had a stroke immediately after I arrived at the nurse station and also my  other reason of not feeling comfortable taking care of the patient was that I had no background of taking care of patients who had just had  a stroke. I asked one of my co- travel nurse Deedee Armas  If she would swap one of her patients with me based on the fact that she had experience in taking care of stroke patients. She agreed and  we swapped patients. However, before I gave  A report to her, I contacted  DR. Sugar Hoover in regard to patients Neuro changes due to acute stroke and explained patient's changes and notified him of the  CT angiogram recommendation made by the telemetry doctor who had assessed the patient virtually after the stroke. Shiv Tate declined putting the order with concern of patient's kidney problem. He recommended calling the neuro doctor to put in the order. Contacted Forrest Weaver to explain about the patient, my conversation with Shiv Tate, notified her of telemetry doctor's recommendation and requested if she would put in CT scan order. Maryjane Wade in response said  that she would not put in the order  because she had not assessed the patient. he then asked me about patient's renal labs, I read the labs to her and she verbally gave me an order to put in the the CT order. I did not feel comfortable putting in this order so I again requested her to put the order in. In response,Dr Jane Hernández stated that she had no access to the computer at that very moment and she asked  me to put in the order in her name, order read back done and I also asked the Melquiadesronal Wade to spell out her names for me so as to assist me in identifying her names when putting in the order. Unit coordinator Jamal Howard witnessed me talking to the doctor about the CT angiogram order.  I still did not feel contented so I called the house supervisor to inquire if it was ok for me to take a verbal CT order from the doctor and she responded saying that it was okay for me to do that. After talking to her, I called Sherrell Baldwin the RN who had taken care of the patient in the day shift to help guide me on putting in the order. There after, CT scan order was placed per doctor's order. Erendira Russ notified about this incident.

## 2022-02-23 NOTE — PROGRESS NOTES
PHYSICAL THERAPY EVALUATION  Patient: Manjula Guerrero (20 y.o. female)  Date: 2/23/2022  Primary Diagnosis: CVA (cerebral vascular accident) ([facility] [address] [I63.9]  Elevated troponin [R77.8]        Precautions: falls       ASSESSMENT  Pt is a 79 yo female admitted on 2/21/2022 for slurred speech and recent change in status per medical records; pt currently being treated for CVA, NSTEMI, renal failure, and elevated troponin. Pt and pts daughter also reporting pain with urination, MD in room at end of session and made aware. Brain MRI showed acute infarction left paracentral lobule and region of the white matter of the left motor cortex. PMH: DM, HTN, HLD, neuropathy, PAD, sciatica, cervical fusion, right hallux amputation, and h/o stent placements. Pt semi-supine in bed, daughter present with permission from pt given, upon PT/OT arrival, agreeable to evaluation. Pt A&O x 4 with increased time required for place. Per pt and daughter report pt resides with daughter in a 1 story home with ramp to enter, pt required assistance for ADLS/IADLS, WC with primary mobility prior to admission but was able to use RW for short distance amb/transfers with daughter assistance. DME: WC, RW, BSC, rollator. Based on the objective data described below, the patient presents with generalized weakness, impaired functional mobility, impaired amb, impaired balance, and decreased activity tolerance. Pt required mod Ax2 with additional time for bed mobility, mod Ax2 additional time supine <> sit, max Ax2 with additional time sit <> stand transfers. Pt unable to attempt amb at this time due to fatigue with standing and heavy posterior lean. Pt demonstrates weakness right LE compared to left LE, weakness left  compared to right, left facial droop, intact light touch sensation, impaired coordination bilateral LE and UE. Pt did fair with session today with increased need for assistance and fatigue with mobiltiy.  Pt will benefit from continued skilled PT to address above deficits and return to PLOF. Current PT DC recommendation SNF. Current Level of Function Impacting Discharge (mobility/balance): mod to max Ax2    Other factors to consider for discharge: severity of deficits, good family support      PLAN :  Recommendations and Planned Interventions: bed mobility training, transfer training, gait training, therapeutic exercises, neuromuscular re-education, patient and family training/education and therapeutic activities      Frequency/Duration: Patient will be followed by physical therapy:  2-3x/week to address goals. Recommendation for discharge: (in order for the patient to meet his/her long term goals)  Elie Patel    This discharge recommendation:  Has been made in collaboration with the attending provider and/or case management    IF patient discharges home will need the following DME: none         SUBJECTIVE:   Patient stated i'm so tired.     OBJECTIVE DATA SUMMARY:   HISTORY:    Past Medical History:   Diagnosis Date    Diabetes mellitus ([facility] [address]     HTN (hypertension)     Hyperlipidemia     Neuropathy     PAD (peripheral artery disease) (HCC)     PCI    Sciatica      Past Surgical History:   Procedure Laterality Date    HX AMPUTATION Right     great toe    HX CERVICAL FUSION      HX OTHER SURGICAL Bilateral     Stent placement    HX OTHER SURGICAL      HX VASCULAR STENT Bilateral     2 in right 1 in left    HX VASCULAR STENT Bilateral        Home Situation  Home Environment: Private residence  # Steps to Enter: 0  Wheelchair Ramp: Yes  One/Two Story Residence: One story  Living Alone: No  Support Systems: Child(nelida)  Patient Expects to be Discharged to[de-identified] Home  Current DME Used/Available at Home: [employer], rollator,Walker, rolling,Commode, bedside    EXAMINATION/PRESENTATION/DECISION MAKING:   Critical Behavior:  Neurologic State: Alert  Orientation Level: Oriented X4  Cognition: Follows commands,Decreased attention/concentration  Safety/Judgement: Decreased insight into deficits  Hearing:     Skin:  Small open area noted on left buttock, zinc paste applied to area  Edema: none noted   Range Of Motion:  AROM: Generally decreased, functional           PROM: Generally decreased, functional           Strength:    Strength: Generally decreased, functional                    Tone & Sensation:   Tone:  (hypotonic bilateral LE)              Sensation: Intact (light touch)               Coordination:  Coordination: Generally decreased, functional  Vision:      Functional Mobility:  Bed Mobility:  Rolling: Moderate assistance;Assist x2; Additional time  Supine to Sit: Moderate assistance;Assist x2; Additional time  Sit to Supine: Moderate assistance;Assist x2; Additional time  Scooting: Moderate assistance;Assist x2; Additional time  Transfers:  Sit to Stand: Maximum assistance;Assist x2; Additional time  Stand to Sit: Maximum assistance;Assist x2; Additional time                       Balance:   Sitting: Impaired; Without support  Sitting - Static: Fair (occasional); Prop sitting  Sitting - Dynamic: Poor (constant support); Prop sitting  Standing: Impaired; Without support  Standing - Static: Poor;Constant support  Standing - Dynamic : Not tested  Ambulation/Gait Training:      not completed this session    Therapeutic Exercises:   Reviewed ankle pumps, heelslides, and hip abduction/adduction    Functional Measure:  [address] [id number] AM-PAC 6 Clicks         Basic Mobility Inpatient Short Form  How much difficulty does the patient currently have. .. Unable A Lot A Little None   1. Turning over in bed (including adjusting bedclothes, sheets and blankets)? [] 1   [x] 2   [] 3   [] 4   2. Sitting down on and standing up from a chair with arms ( e.g., wheelchair, bedside commode, etc.)   [] 1   [x] 2   [] 3   [] 4   3. Moving from lying on back to sitting on the side of the bed?    [] 1   [x] 2   [] 3   [] 4          How much help from another person does the patient currently need. .. Total A Lot A Little None   4. Moving to and from a bed to a chair (including a wheelchair)? [] 1   [x] 2   [] 3   [] 4   5. Need to walk in hospital room? [] 1   [x] 2   [] 3   [] 4   6. Climbing 3-5 steps with a railing? [x] 1   [] 2   [] 3   [] 4   © 2007, Trustees of [address] [place] [address] [address] [id number], under license to [employer]. All rights reserved     Score:  Initial: 11/24 Most Recent: X (Date: 2/23/22 )   Interpretation of Tool:  Represents activities that are increasingly more difficult (i.e. Bed mobility, Transfers, Gait). Score 24 23 22-20 19-15 14-10 9-7 6   Modifier CH CI CJ CK CL CM CN         Physical Therapy Evaluation Charge Determination   History Examination Presentation Decision-Making   HIGH Complexity :3+ comorbidities / personal factors will impact the outcome/ POC  HIGH Complexity : 4+ Standardized tests and measures addressing body structure, function, activity limitation and / or participation in recreation  LOW Complexity : Stable, uncomplicated  Other outcome measures ampac 6  mod      Based on the above components, the patient evaluation is determined to be of the following complexity level: LOW     Pain Rating:  10/10 with urination, nsg and md in room and aware    Activity Tolerance:   Fair and requires rest breaks    After treatment patient left in no apparent distress:   Supine in bed, Call bell within reach, Bed / chair alarm activated, Caregiver / family present and Side rails x 3 and nsg updated. GOALS:    Problem: Mobility Impaired (Adult and Pediatric)  Goal: *Acute Goals and Plan of Care (Insert Text)  Description: Pt will be I with LE HEP in 7 days. Pt will perform bed mobility with min Ax1 in 7 days. Pt will perform transfers with min Ax1 in 7 days. Pt will amb 5-10 feet with LRAD safely with min Ax1 in 7 days. Pt will demonstrate improvement in dynamic seated and standing balance from max Ax2 to min A in 7 days. Outcome: Not Met       COMMUNICATION/EDUCATION:   The patients plan of care was discussed with: Occupational therapist, Speech therapist, and Case management. Fall prevention education was provided and the patient/caregiver indicated understanding., Patient/family have participated as able in goal setting and plan of care. , and Patient/family agree to work toward stated goals and plan of care. PT/OT sessions occurred together for increased safety of pt and clinician.        Thank you for this referral.  Jonathan Mayfield, PT, DPT   Time Calculation: 35 mins

## 2022-02-23 NOTE — PROGRESS NOTES
OCCUPATIONAL THERAPY EVALUATION  Patient: Jeevan Martin (47 y.o. female)  Date: 2/23/2022  Primary Diagnosis: CVA (cerebral vascular accident) ([facility] [address] [I63.9]  Elevated troponin [R77.8]        Precautions: Ax2, fall risk       ASSESSMENT  Pt is a 79 y/o F with PMH of DM, HTN, hyperlipidemia, neuropathy, PAD, and sciatica presenting to [facility] with c/o slurred speech and extremity weakness, admitted 02/21/22  and being treated for facial weakness, chronic renal impairment, and elevated troponin. Pt was recently admitted to [facility] due to stroke-like symptoms last week and was d/c home. After admission to [facility], pt had a code stroke called last night, 02/22/22, due to pt demonstrating new onset of increased confusion, L facial droop, R sided weakness, and slurred speech. Pt's MRI results show a chronic lacunar infarct of the L basal ganglia as well as an acute infarction of L paracentral lobule and region of the white matter of L motor cortex. Pt received semi-supine in bed upon arrival, AXO x4, and agreeable to OT/PT evaluation at this time. Per pt report, pt lives with her daughter in a one-story home with a ramp entrance, was receiving assist with all ADLs from her daughter and was mod I using a wheelchair at [employer]. Other DME owned includes: rollator and RW. Pt's daughter reports pt would use the RW to stand pivot transfer to the [facility] and would use the rollator for others to push pt when she was feeling weak. Pt's daughter educated on gait belt and safe stand pivot transfer technique as pt's daughter stated she recently purchased a gait belt to assist with transferring pt. Based on current observations, pt presents with deficits in generalized strength/AROM, sitting balance, and functional activity tolerance impacting overall performance of ADLs and functional transfers/mobility. Pt currently requires mod Ax2 with additional time for bed mobility.  Pt requires max Ax2 with additional time for sit>stand and stand>sit transfers to and from seated EOB using RW. Pt reported she felt weak so she returned sitting EOB. Pt returned supine with mod Ax2 in order to be changed after noticing she was soiled. Pt rolled side to side in bed with mod Ax2 to be cleaned up with total A after soiling self. Pt's chux pad changed and zinc paste applied to pt's sacral area where pt was also noted to have a wound. Overall, pt tolerates session fair. Pt would benefit from continued skilled OT services to address current impairments and improve IND and safety with self cares and functional transfers/mobility. Recommend discharge to SNF once medically appropriate. Other factors to consider for discharge: home support, PLOF, severity of deficits     Patient will benefit from skilled therapy intervention to address the above noted impairments. PLAN :  Recommendations and Planned Interventions: self care training, functional mobility training, therapeutic exercise, therapeutic activities, endurance activities and patient education    Frequency/Duration: Patient will be followed by occupational therapy:  2-3x/week to address goals. Recommendation for discharge: (in order for the patient to meet his/her long term goals)  Elie Patel    This discharge recommendation:  Has been made in collaboration with the attending provider and/or case management    IF patient discharges home will need the following DME: patient owns DME required for discharge       SUBJECTIVE:   Patient stated I need to use the bathroom.     OBJECTIVE DATA SUMMARY:   HISTORY:   Past Medical History:   Diagnosis Date    Diabetes mellitus ([facility] [address]     HTN (hypertension)     Hyperlipidemia     Neuropathy     PAD (peripheral artery disease) (HCC)     PCI    Sciatica      Past Surgical History:   Procedure Laterality Date    HX AMPUTATION Right     great toe    HX CERVICAL FUSION      HX OTHER SURGICAL Bilateral     Stent placement    HX OTHER SURGICAL      HX VASCULAR STENT Bilateral     2 in right 1 in left    HX VASCULAR STENT Bilateral        Expanded or extensive additional review of patient history:     Home Situation  Home Environment: Private residence  # Steps to Enter: 0  Wheelchair Ramp: Yes  One/Two Story Residence: One story  Living Alone: No  Support Systems: Child(nelida)  Patient Expects to be Discharged to[de-identified] Home  Current DME Used/Available at Home: [employer], rollator,Walker, rolling,Com[employer], bedside    Hand dominance: Right    EXAMINATION OF PERFORMANCE DEFICITS:  Cognitive/Behavioral Status:  Neurologic State: Alert  Orientation Level: Oriented X4  Cognition: Follows commands;Decreased attention/concentration             Range of Motion:  AROM: Generally decreased, functional  PROM: Generally decreased, functional                      Strength:  Strength: Generally decreased, functional                Coordination:  Coordination: Generally decreased, functional            Balance:  Sitting: Impaired; Without support  Sitting - Static: Fair (occasional); Prop sitting  Sitting - Dynamic: Poor (constant support); Prop sitting  Standing: Impaired; Without support  Standing - Static: Poor;Constant support  Standing - Dynamic : Not tested    Functional Mobility and Transfers for ADLs:  Bed Mobility:  Rolling: Moderate assistance;Assist x2; Additional time  Supine to Sit: Moderate assistance;Assist x2; Additional time  Sit to Supine: Moderate assistance;Assist x2; Additional time  Scooting: Moderate assistance;Assist x2; Additional time    Transfers:  Sit to Stand: Maximum assistance;Assist x2; Additional time  Stand to Sit: Maximum assistance;Assist x2; Additional time      ADL Intervention and task modifications: Toileting  Bladder Hygiene: Total assistance (dependent)  Clothing Management: Total assistance (dependent)         Therapeutic Exercise:  Pt would benefit from UE HEP initiated at next session.      Functional Measure:    [facility] AM-PACTM \"6 Clicks\"                                                       Daily Activity Inpatient Short Form  How much help from another person does the patient currently need. .. Total; A Lot A Little None   1. Putting on and taking off regular lower body clothing? [x]  1 []  2 []  3 []  4   2. Bathing (including washing, rinsing, drying)? []  1 [x]  2 []  3 []  4   3. Toileting, which includes using toilet, bedpan or urinal? [x] 1 []  2 []  3 []  4   4. Putting on and taking off regular upper body clothing? []  1 []  2 [x]  3 []  4   5. Taking care of personal grooming such as brushing teeth? []  1 []  2 [x]  3 []  4   6. Eating meals? []  1 []  2 [x]  3 []  4   © 2007, Trustees of [address] [place] [address] [address] [id number], under license to [employer]. All rights reserved     Score: 13/24     Interpretation of Tool:  Represents clinically-significant functional categories (i.e. Activities of daily living). Percentage of Impairment CH    0%   CI    1-19% CJ    20-39% CK    40-59% CL    60-79% CM    80-99% CN     100%   AMPA  Score 6-24 24 23 20-22 15-19 10-14 7-9 6         Occupational Therapy Evaluation Charge Determination   History Examination Decision-Making   LOW Complexity : Brief history review  LOW Complexity : 1-3 performance deficits relating to physical, cognitive , or psychosocial skils that result in activity limitations and / or participation restrictions  LOW Complexity : No comorbidities that affect functional and no verbal or physical assistance needed to complete eval tasks       Based on the above components, the patient evaluation is determined to be of the following complexity level: LOW   Pain Rating:  10/10 while urinating    Activity Tolerance:   Fair and requires rest breaks  Please refer to the flowsheet for vital signs taken during this treatment.     After treatment patient left in no apparent distress:    Supine in bed, Call bell within reach, Caregiver / family present, Side rails x 3 and MD present talking to pt and daughter    COMMUNICATION/EDUCATION:   The patients plan of care was discussed with: Physical therapist.     Patient/family agree to work toward stated goals and plan of care. This patients plan of care is appropriate for delegation to [facility]. PT/OT sessions occurred together for increased safety of pt and clinician. Thank you for this referral.  Lacey Rice OT  Time Calculation: 32 mins    Problem: Self Care Deficits Care Plan (Adult)  Goal: *Acute Goals and Plan of Care (Insert Text)  Description: 1. Pt will be min A sup <> sit in prep for EOB ADLs  2. Pt will be mod I grooming sitting EOB  3. Pt will be min A sit <> stand/stand pivot transfer in prep for toileting LRAD  4. Pt will be min A toileting/toilet transfer/cloth mgmt LRAD  6.  Pt will be mod I following UE HEP in prep for self care tasks      Outcome: Not Met

## 2022-02-23 NOTE — CONSULTS
Consult Date: 2/23/2022    Consults Ms. Jarred Boogie is a 78year old woman with HTN, DM who had a stroke last week with right facial droop and right sided weakness worked up in [facility] who was brought in for worsening speech and left sided weakness per daughters at bedside. She is living at home and uses a walker to ambulate at baseline. Ct head done yesterday shows old basal ganglia stroke. Her BP are elevated to 976S systolic. Subjective  patient had some disorientation and left leg weakness last night. Code neuro was called and CTA head and neck done which showed no LVO. Left leg weakness resolved and mental status back to baseline. Reviewed records from [facility] admission. She had a right cerebellar peduncle stroke. TTE was normal with EF 60-65%.      Past Medical History:   Diagnosis Date    Diabetes mellitus ([facility] [address]     HTN (hypertension)     Hyperlipidemia     Neuropathy     PAD (peripheral artery disease) (HCC)     PCI    Sciatica       Past Surgical History:   Procedure Laterality Date    HX AMPUTATION Right     great toe    HX CERVICAL FUSION      HX OTHER SURGICAL Bilateral     Stent placement    HX OTHER SURGICAL      HX VASCULAR STENT Bilateral     2 in right 1 in left    HX VASCULAR STENT Bilateral      Family History   Problem Relation Age of Onset    Cancer Mother     Diabetes Mother     Hypertension Mother       Social History     Tobacco Use    Smoking status: Never Smoker    Smokeless tobacco: Never Used   Substance Use Topics    Alcohol use: Not Currently       Current Facility-Administered Medications   Medication Dose Route Frequency Provider Last Rate Last Admin    aspirin chewable tablet 81 mg  81 mg Oral DAILY Omer Diana MD   81 mg at 02/22/22 1146    clopidogreL (PLAVIX) tablet 75 mg  75 mg Oral DAILY Omer Diana MD   75 mg at 02/22/22 1132    atorvastatin (LIPITOR) tablet 80 mg  80 mg Oral DAILY Omer Diana MD   80 mg at 02/22/22 1132    labetaloL (NORMODYNE;TRANDATE) 20 mg/4 mL (5 mg/mL) injection 10 mg  10 mg IntraVENous Q6H PRN Erasmo Murray MD        sodium chloride (NS) flush 5-40 mL  5-40 mL IntraVENous Q8H Orestes Norman MD   10 mL at 02/23/22 0542    sodium chloride (NS) flush 5-40 mL  5-40 mL IntraVENous PRN Ely JACKSON MD        heparin (porcine) injection 5,000 Units  5,000 Units SubCUTAneous Q8H Ely JACKSON MD   5,000 Units at 02/22/22 1132    brimonidine (ALPHAGAN) 0.2 % ophthalmic solution 1 Drop  1 Drop Both Eyes TID Lars Unger MD   1 Drop at 02/22/22 1148    ferrous sulfate tablet 325 mg  325 mg Oral BID Ely JACKSON MD   325 mg at 02/22/22 1132    insulin lispro (HUMALOG) injection   SubCUTAneous Q6H Ely JACKSON MD   3 Units at 02/22/22 1146    glucose chewable tablet 16 g  4 Tablet Oral PRN Lars Unger MD        glucagon (GLUCAGEN) injection 1 mg  1 mg IntraMUSCular PRN Ely JACKSON MD        dextrose 10% infusion 125-250 mL  125-250 mL IntraVENous PRN Ely JACKSON MD        metoprolol succinate (TOPROL-XL) XL tablet 50 mg  50 mg Oral DAILY Candy Lundy MD        amLODIPine (NORVASC) tablet 10 mg  10 mg Oral DAILY Candy Lundy MD            Review of Systems   Neurological: Positive for facial asymmetry and weakness. All other systems reviewed and are negative. Objective     Vital signs for last 24 hours:  Visit Vitals  BP (!) 161/72 (BP 1 Location: Left upper arm)   Pulse 97   Temp 98.1 °F (36.7 °C)   Resp 17   Ht 5' 7\" (1.702 m)   Wt 81.6 kg (180 lb)   SpO2 98%   BMI 28.19 kg/m²       Intake/Output this shift:  Current Shift: No intake/output data recorded.   Last 3 Shifts: 02/21 1901 - 02/23 0700  In: -   Out: 1100 [Urine:1100]    Data Review:   Recent Results (from the past 24 hour(s))   EKG, 12 LEAD, INITIAL    Collection Time: 02/22/22 10:24 AM   Result Value Ref Range    Ventricular Rate 81 BPM    Atrial Rate 81 BPM    P-R Interval 162 ms    QRS Duration 100 ms    Q-T Interval 412 ms    QTC Calculation (Bezet) 478 ms    Calculated P Axis 38 degrees    Calculated R Axis 39 degrees    Calculated T Axis 45 degrees    Diagnosis       Normal sinus rhythm  Nonspecific T wave abnormality  Prolonged QT  Abnormal ECG  When compared with ECG of 21-FEB-2022 21:39, (Unconfirmed)  No significant change was found  Confirmed by SANKET Reis MD ([id number]) on 2/22/2022 10:46:19 AM     GLUCOSE, POC    Collection Time: 02/22/22 10:45 AM   Result Value Ref Range    Glucose (POC) 173 (H) 65 - 117 mg/dL    Performed by Josee Khan    TROPONIN-HIGH SENSITIVITY    Collection Time: 02/22/22 11:57 AM   Result Value Ref Range    Troponin-High Sensitivity 483 (HH) 0 - 51 ng/L   METABOLIC PANEL, BASIC    Collection Time: 02/22/22 11:57 AM   Result Value Ref Range    Sodium 141 136 - 145 mmol/L    Potassium 4.2 3.5 - 5.1 mmol/L    Chloride 110 (H) 97 - 108 mmol/L    CO2 21 21 - 32 mmol/L    Anion gap 10 5 - 15 mmol/L    Glucose 190 (H) 65 - 100 mg/dL    BUN 63 (H) 6 - 20 mg/dL    Creatinine 1.64 (H) 0.55 - 1.02 mg/dL    BUN/Creatinine ratio 38 (H) 12 - 20      GFR est AA 37 (L) >60 ml/min/1.73m2    GFR est non-AA 31 (L) >60 ml/min/1.73m2    Calcium 9.0 8.5 - 10.1 mg/dL   EKG, 12 LEAD, INITIAL    Collection Time: 02/22/22  2:33 PM   Result Value Ref Range    Ventricular Rate 77 BPM    Atrial Rate 77 BPM    P-R Interval 160 ms    QRS Duration 98 ms    Q-T Interval 426 ms    QTC Calculation (Bezet) 482 ms    Calculated P Axis 60 degrees    Calculated R Axis 38 degrees    Calculated T Axis 53 degrees    Diagnosis       Normal sinus rhythm  Minimal voltage criteria for LVH, may be normal variant  Nonspecific T wave abnormality  Prolonged QT  Abnormal ECG  When compared with ECG of 22-FEB-2022 10:24,  No significant change was found  Confirmed by Marycruz Reis MD ([id number]) on 2/22/2022 3:01:09 PM     EKG, 12 LEAD, INITIAL    Collection Time: 02/22/22  5:53 PM   Result Value Ref Range    Ventricular Rate 76 BPM    Atrial Rate 76 BPM    P-R Interval 144 ms    QRS Duration 94 ms    Q-T Interval 422 ms    QTC Calculation (Bezet) 474 ms    Calculated P Axis 46 degrees    Calculated R Axis 37 degrees    Calculated T Axis 68 degrees    Diagnosis       Normal sinus rhythm  Nonspecific T wave abnormality  Prolonged QT  Abnormal ECG  When compared with ECG of 22-FEB-2022 14:33,  Nonspecific T wave abnormality, improved in Inferior leads  Nonspecific T wave abnormality, improved in Lateral leads  Confirmed by REMY ELISE MD ([id number]) on 2/22/2022 8:24:56 PM     GLUCOSE, POC    Collection Time: 02/22/22  6:39 PM   Result Value Ref Range    Glucose (POC) 130 (H) 65 - 117 mg/dL    Performed by Bhupinder Alicia, POC    Collection Time: 02/22/22  8:52 PM   Result Value Ref Range    Glucose (POC) 166 (H) 65 - 117 mg/dL    Performed by MovieSetwayne Coblaura    GLUCOSE, POC    Collection Time: 02/23/22  1:08 AM   Result Value Ref Range    Glucose (POC) 176 (H) 65 - 117 mg/dL    Performed by MovieSetwayne [employer]curics    GLUCOSE, POC    Collection Time: 02/23/22  5:39 AM   Result Value Ref Range    Glucose (POC) 176 (H) 65 - 117 mg/dL    Performed by Yudy Corona        Physical Exam    Neuro Physical Exam      General: Well developed, well nourished. Patient in no apparent distress. Cardiac: Regular rate and rhythm with no murmurs. Neurological Exam:  Mental Status: Awake, alert, oriented x4, mild dysarthria    Cranial Nerves:   Intact visual fields. PERRL, EOM's full, no nystagmus, no ptosis. Facial sensation is normal. Right upper and lower facial droop. Palate is midline. Normal sternocleidomastoid strength. Tongue is midline. Hearing is intact bilaterally. Motor:  5/5 strength in upper ext, RLE: 2/5 hip flexion, LLE: 3/5 hip flexion   Reflexes:   Deep tendon reflexes 2+/4 and symmetrical.   Sensory:   Normal to light touch throughout   Gait:  Not tested (uses walker at baseline)   Tremor:   No tremor noted.    Cerebellar: No cerebellar signs present. Babinski:      Down on left, amputated toe on right     Assessment and Plan: Ms. Sheryl Hunter is a 78year old woman with recent stroke causing right facial droop and right sided weakness per daughters and patient. She had a fill stroke workup done at [facility] last week. Will not repeat everything here. Request records from [facility]. - Mri brain w/o contrast shows new left paracentral region and multiple old strokes  - BP goal < 150/90. Started back on diltiazem, 180 mg tab not available  - cont asa 81mg,  atorvastatin 80mg, switch plavix to xarelto 2.5 mg bid given she still had a stroke while on asa and plavix. - TTE already done, report in paper chart.  Will not repeat  - swallow eval  - cardiac monitoring  -pt/ot

## 2022-02-23 NOTE — PROGRESS NOTES
Progress Note      2/23/2022 11:18 AM  NAME: Jerilynn Krabbe   MRN:  [id number]   Admit Diagnosis: [facility]cere[facility] [I63.9]  Elevated troponin [R77.8]                Assessment/Plan:       1. Troponin leak with unremarkable EKG and without \anginal symptoms. Echo at [facility] this month with normal LV function. Cardiac cath in view of her risk factors discussed. Patient declines. Continue current antianginal regimen consisting of aspirin, calcium channel blocker and metoprolol with high-dose statin.     2. Hypertension, relatively stable on combination of calcium channel blocker and beta-blocker.     3. Dyslipidemia, continue atorvastatin     4. CVA and Bell's palsy. Patient is being followed by neurology.     5. CKD,  will keep a watch on serum creatinine. []       High complexity decision making was performed in this patient at high risk for decompensation with multiple organ involvement. Subjective:     Jerilynn Krabbe denies chest pain, dyspnea. Discussed with RN events overnight. Review of Systems:    Review of Systems   Constitutional: Negative for chills, fever and weight loss. HENT: Negative for congestion and sore throat. Eyes: Negative for blurred vision and double vision. Respiratory: Negative for cough, shortness of breath and wheezing. Cardiovascular: Negative for claudication and PND. Gastrointestinal: Negative for abdominal pain, blood in stool, nausea and vomiting. Genitourinary: Negative for dysuria and hematuria. Musculoskeletal: Negative for joint pain and myalgias. Skin: Negative for itching and rash. Neurological: Positive for sensory change, speech change and focal weakness. Negative for dizziness, loss of consciousness, weakness and headaches. Endo/Heme/Allergies: Negative for polydipsia. Does not bruise/bleed easily. Psychiatric/Behavioral: Negative for depression and hallucinations.         Objective: Physical Exam:    Last 24hrs VS reviewed since prior progress note. Most recent are:    Visit Vitals  BP (!) 151/69 (BP 1 Location: Left upper arm)   Pulse 89   Temp 98.1 °F (36.7 °C)   Resp 14   Ht 5' 7\" (1.702 m)   Wt 81.6 kg (180 lb)   SpO2 99%   BMI 28.19 kg/m²       Intake/Output Summary (Last 24 hours) at 2/23/2022 1118  Last data filed at 2/23/2022 0540  Gross per 24 hour   Intake --   Output 1100 ml   Net -1100 ml        Physical Exam:  Constitutional: Well developed well-nourished patient who appeared in no acute distress  HEENT: Normocephalic and atraumatic. Extraocular movement intact. Pupil reactive to light bilaterally  Neck: Supple without thyromegaly  Lungs: Scattered rhonchi diffusely  Heart:  Regular rhythm, normal S1-S2.  Jugular venous distention absent. Carotid bruits absent. PMI in fifth intercostal space on left midclavicular line. Extremities  Trace edema bilaterally  Abdomen: Soft nontender nondistended hypoactive bowel sounds  Skin: Dry and warm    []         Post-cath site without hematoma, bruit, tenderness, or thrill. Distal pulses intact. PMH/SH reviewed - no change compared to H&P    Data Review    Telemetry: normal sinus rhythm     EKG:   []  No new EKG for review    CTA HEAD NECK W WO CONT   Final Result   Negative head and neck CTA. Please note that all carotid bifurcation stenoses are measured as per NASCET   criteria. CT CODE NEURO HEAD WO CONTRAST   Final Result   1. No acute large vessel intracranial ischemia, hemorrhage. Called report to   patient's nurse Linnette Perry at 7:12 PM 2/22/2022.   2. Right basal ganglia lacunar infarct. 3. Periventricular microangiopathy. 4. Pansinusitis. See above. MRI BRAIN WO CONT   Final Result   Acute infarction left paracentral lobule and region of the white   matter of the left motor cortex. CT CODE NEURO HEAD WO CONTRAST   Final Result      1. No acute intracranial hemorrhage.    2. Left basal ganglia lacunar infarct, which is age indeterminate in the absence   of any prior outside studies, but favored to be chronic. 3. Moderate to severe paranasal sinus disease.       Results called to Dr. Renée Reinoso at 9:39 PM on 2/21/2022      XR SWALLOW FUNC VIDEO    (Results Pending)        Recent Results (from the past 24 hour(s))   TROPONIN-HIGH SENSITIVITY    Collection Time: 02/22/22 11:57 AM   Result Value Ref Range    Troponin-High Sensitivity 483 (HH) 0 - 51 ng/L   METABOLIC PANEL, BASIC    Collection Time: 02/22/22 11:57 AM   Result Value Ref Range    Sodium 141 136 - 145 mmol/L    Potassium 4.2 3.5 - 5.1 mmol/L    Chloride 110 (H) 97 - 108 mmol/L    CO2 21 21 - 32 mmol/L    Anion gap 10 5 - 15 mmol/L    Glucose 190 (H) 65 - 100 mg/dL    BUN 63 (H) 6 - 20 mg/dL    Creatinine 1.64 (H) 0.55 - 1.02 mg/dL    BUN/Creatinine ratio 38 (H) 12 - 20      GFR est AA 37 (L) >60 ml/min/1.73m2    GFR est non-AA 31 (L) >60 ml/min/1.73m2    Calcium 9.0 8.5 - 10.1 mg/dL   EKG, 12 LEAD, INITIAL    Collection Time: 02/22/22  2:33 PM   Result Value Ref Range    Ventricular Rate 77 BPM    Atrial Rate 77 BPM    P-R Interval 160 ms    QRS Duration 98 ms    Q-T Interval 426 ms    QTC Calculation (Bezet) 482 ms    Calculated P Axis 60 degrees    Calculated R Axis 38 degrees    Calculated T Axis 53 degrees    Diagnosis       Normal sinus rhythm  Minimal voltage criteria for LVH, may be normal variant  Nonspecific T wave abnormality  Prolonged QT  Abnormal ECG  When compared with ECG of 22-FEB-2022 10:24,  No significant change was found  Confirmed by Karime Haynes MD, SANKET ([id number]) on 2/22/2022 3:01:09 PM     EKG, 12 LEAD, INITIAL    Collection Time: 02/22/22  5:53 PM   Result Value Ref Range    Ventricular Rate 76 BPM    Atrial Rate 76 BPM    P-R Interval 144 ms    QRS Duration 94 ms    Q-T Interval 422 ms    QTC Calculation (Bezet) 474 ms    Calculated P Axis 46 degrees    Calculated R Axis 37 degrees    Calculated T Axis 68 degrees    Diagnosis Normal sinus rhythm  Nonspecific T wave abnormality  Prolonged QT  Abnormal ECG  When compared with ECG of 22-FEB-2022 14:33,  Nonspecific T wave abnormality, improved in Inferior leads  Nonspecific T wave abnormality, improved in Lateral leads  Confirmed by Nate ELISE MD ([id number]) on 2/22/2022 8:24:56 PM     GLUCOSE, POC    Collection Time: 02/22/22  6:39 PM   Result Value Ref Range    Glucose (POC) 130 (H) 65 - 117 mg/dL    Performed by Bhupinder Alicia, POC    Collection Time: 02/22/22  8:52 PM   Result Value Ref Range    Glucose (POC) 166 (H) 65 - 117 mg/dL    Performed by Ramos Pel    GLUCOSE, POC    Collection Time: 02/23/22  1:08 AM   Result Value Ref Range    Glucose (POC) 176 (H) 65 - 117 mg/dL    Performed by Ramos Pel    GLUCOSE, POC    Collection Time: 02/23/22  5:39 AM   Result Value Ref Range    Glucose (POC) 176 (H) 65 - 117 mg/dL    Performed by Mckay Salas    GLUCOSE, POC    Collection Time: 02/23/22 11:04 AM   Result Value Ref Range    Glucose (POC) 230 (H) 65 - 117 mg/dL    Performed by Lana Mckenzie (Trvlr)           Echo:       Patient's  EKG, laboratory data and echocardiogram were individually reviewed by me. Lab Data:    Recent Labs     02/21/22 2142   WBC 12.9*   HGB 8.4*   HCT 26.5*        No results for input(s): INR, PTP, APTT, INREXT in the last 72 hours. Recent Labs     02/22/22  1157 02/21/22  2142    137   K 4.2 Hemolyzed, recollect requested   * 109*   CO2 21 20*   BUN 63* 73*   CREA 1.64* 1.93*   * 177*   CA 9.0 8.7     No results for input(s): CPK, CKNDX, TROIQ in the last 72 hours.     No lab exists for component: CPKMB  Lab Results   Component Value Date/Time    Cholesterol, total 213 (H) 09/25/2019 02:22 PM    HDL Cholesterol 39 (L) 09/25/2019 02:22 PM    LDL,Direct 79 06/28/2021 12:00 AM    LDL, calculated 135 (H) 09/25/2019 02:22 PM    Triglyceride 194 (H) 09/25/2019 02:22 PM       Recent Labs     02/21/22  2142   AP 82 TP 6.7   ALB 2.3*   GLOB 4.4*     No results for input(s): PH, PCO2, PO2 in the last 72 hours. Medications Personally Reviewed:    Current Facility-Administered Medications   Medication Dose Route Frequency    rivaroxaban (XARELTO) tablet 2.5 mg  2.5 mg Oral BID WITH MEALS    aspirin chewable tablet 81 mg  81 mg Oral DAILY    atorvastatin (LIPITOR) tablet 80 mg  80 mg Oral DAILY    labetaloL (NORMODYNE;TRANDATE) 20 mg/4 mL (5 mg/mL) injection 10 mg  10 mg IntraVENous Q6H PRN    sodium chloride (NS) flush 5-40 mL  5-40 mL IntraVENous Q8H    sodium chloride (NS) flush 5-40 mL  5-40 mL IntraVENous PRN    brimonidine (ALPHAGAN) 0.2 % ophthalmic solution 1 Drop  1 Drop Both Eyes TID    ferrous sulfate tablet 325 mg  325 mg Oral BID    insulin lispro (HUMALOG) injection   SubCUTAneous Q6H    glucose chewable tablet 16 g  4 Tablet Oral PRN    glucagon (GLUCAGEN) injection 1 mg  1 mg IntraMUSCular PRN    dextrose 10% infusion 125-250 mL  125-250 mL IntraVENous PRN    metoprolol succinate (TOPROL-XL) XL tablet 50 mg  50 mg Oral DAILY    amLODIPine (NORVASC) tablet 10 mg  10 mg Oral DAILY         Prior to Admission medications    Medication Sig Start Date End Date Taking?  Authorizing Provider   atorvastatin (LIPITOR) 20 mg tablet TAKE 1 TABLET BY MOUTH EVERY NIGHT 2/3/22   Misti Reina MD   gabapentin (NEURONTIN) 300 mg capsule TAKE 2 CAPSULES BY MOUTH THREE TIMES DAILY WITH MEALS 11/9/21   Misti Reina MD   Blood-Glucose Meter (OneTouch Verio Flex meter) misc Test blood glucose 3 time daily Dx Code: E11.65 7/6/21   Misti Reina MD   glucose blood VI test strips (OneTouch Verio test strips) strip Test blood glucose 3 time daily Dx Code: E11.65 7/6/21   Misti Reina MD   lancets (OneTouch Delica Plus Lancet) 33 gauge misc Test blood glucose 3 time daily Dx Code: E11.65 7/6/21   Misti Reina MD   BD [place] 2nd Gen Pen Needle 32 gauge x 5/32\" ndle USE TO INJECT LANTUS AND VICTOZA DAILY 7/6/21   Danna Akhtar MD   insulin glargine (Lantus Solostar U-100 Insulin) 100 unit/mL (3 mL) inpn 22 Units by SubCUTAneous route daily. 6/18/21   Kayla Nickerson MD   liraglutide (Victoza 3-Sheldon) 0.6 mg/0.1 mL (18 mg/3 mL) pnij ADMINISTER 1.8 MG UNDER THE SKIN DAILY. 6/18/21   Danna Akhtar MD   dilTIAZem ER (CARDIZEM CD) 180 mg capsule TAKE 1 CAPSULE BY MOUTH DAILY 6/24/20   Danna Akhtar MD   brimonidine (ALPHAGAN) 0.2 % ophthalmic solution Administer 1 Drop to both eyes three (3) times daily. Provider, Historical   aspirin delayed-release 81 mg tablet Take  by mouth daily. Provider, Historical   metFORMIN (GLUCOPHAGE) 1,000 mg tablet TAKE 1/2 TABLET BY MOUTH TWICE DAILY WITH MEALS 3/23/20   Danna Akhtar MD   losartan (COZAAR) 50 mg tablet TK 2 TS PO QD IN THE EVENING 8/27/19   Provider, Historical   VICTOZA 3-SHELDON 0.6 mg/0.1 mL (18 mg/3 mL) pnij ADMINISTER 1.8 MG UNDER THE SKIN EVERY MORNING 4/29/19   Danna Akhtar MD   clopidogrel (PLAVIX) 75 mg tab Take 75 mg by mouth daily. Provider, Historical   CHELY PEN NEEDLE 32 gauge x 5/32\" ndle USE TO INJECT LANTUS AND VICTOZA EVERY DAY 3/10/18   Danna Akhtar MD   ferrous sulfate 325 mg (65 mg iron) tablet Take 1 Tab by mouth two (2) times a day. 12/19/17   Danna Akhtar MD   polyethylene glycol (MIRALAX) 17 gram packet Take 1 Packet by mouth daily. 7/18/16   Kayla Nickerson MD   oxyCODONE IR (ROXICODONE) 5 mg immediate release tablet Take 5 mg by mouth every six (6) hours as needed for Pain. Provider, Historical   HYDROcodone-acetaminophen (NORCO) 5-325 mg per tablet Take  by mouth.     Provider, Bernadette Wheeler MD

## 2022-02-23 NOTE — PROGRESS NOTES
Stroke education done with patient and family. Reviewed sign and symptoms, risk factor,medications and procedures with patient and family. Stoke booklet given to patient and family at bedside.

## 2022-02-24 NOTE — CONSULTS
Consult Date: 2/24/2022    IP CONSULT TO NEPHROLOGY  Consult performed by: Edyta Forbes MD  Consult ordered by: Kiarra Glover MD          Subjective   HISTORY OF PRESENTING ILLNESS     Lyric Cornell is a 63-year-old female with history of diabetes mellitus, hypertension, hyperlipidemia, PAD and history of recent stroke presented to the ED via EMS due to altered mental status and slurred speech. Patient was seen at [facility] about 2 weeks ago for stroke. Patient was recently on prednisone, otherwise denies new medications. Patient is unsure if she received contrast in her workup at [facility]. Patient did undergo head neck CT with and without contrast during her initial workup at [facility]. Patient utilizes walker at home for assistance at baseline. Patient was seen and examined today for evaluation of abnormal kidney function. Patient's daughter, [facility], was present at bedside. Patient denies personal history of kidney disease or kidney stones. Patient takes ibuprofen and tylenol at home as needed for pain. Review of Systems   Constitutional: Negative for chills and fever. HENT: Negative for congestion, ear discharge, ear pain and sinus pain. Eyes: Negative for pain and itching. Respiratory: Negative for cough and wheezing. Cardiovascular: Negative for chest pain. Gastrointestinal: Negative for abdominal pain, constipation, diarrhea, nausea and vomiting. Genitourinary: Negative for difficulty urinating, flank pain, hematuria and urgency. Musculoskeletal: Negative for joint swelling and myalgias. Neurological: Positive for weakness. Negative for dizziness, light-headedness and headaches. Hematological: Negative for adenopathy. Does not bruise/bleed easily.        Past Medical History:   Diagnosis Date    Diabetes mellitus ([facility]     HTN (hypertension)     Hyperlipidemia     Neuropathy     PAD (peripheral artery disease) (HCC)     PCI    Sciatica       Past Surgical History:   Procedure Laterality Date    HX AMPUTATION Right     great toe    HX CERVICAL FUSION      HX OTHER SURGICAL Bilateral     Stent placement    HX OTHER SURGICAL      HX VASCULAR STENT Bilateral     2 in right 1 in left    HX VASCULAR STENT Bilateral      Family History   Problem Relation Age of Onset    Cancer Mother     Diabetes Mother     Hypertension Mother       Social History     Tobacco Use    Smoking status: Never Smoker    Smokeless tobacco: Never Used   Substance Use Topics    Alcohol use: Not Currently       Current Facility-Administered Medications   Medication Dose Route Frequency Provider Last Rate Last Admin    HYDROcodone-acetaminophen (NORCO) 5-325 mg per tablet 1 Tablet  1 Tablet Oral Q4H PRN Tesha JACKSON MD   1 Tablet at 02/24/22 1429    cefTRIAXone (ROCEPHIN) 1 g in sterile water (preservative free) 10 mL IV syringe  1 g IntraVENous Q24H Tesha JACKSON MD        dicyclomine (BENTYL) capsule 10 mg  10 mg Oral TID Wing Osborne MD        rivaroxaban (XARELTO) tablet 2.5 mg  2.5 mg Oral BID WITH MEALS Omer Diana MD   2.5 mg at 02/24/22 0911    phenazopyridine (PYRIDIUM) tab 95 mg  95 mg Oral DAILY Tesha JACKSON MD   95 mg at 02/23/22 1651    atorvastatin (LIPITOR) tablet 80 mg  80 mg Oral DAILY Omer Diana MD   80 mg at 02/24/22 0910    labetaloL (NORMODYNE;TRANDATE) 20 mg/4 mL (5 mg/mL) injection 10 mg  10 mg IntraVENous Q6H PRN Omer Diana MD        sodium chloride (NS) flush 5-40 mL  5-40 mL IntraVENous Q8H Orestes Norman MD   10 mL at 02/24/22 1333    sodium chloride (NS) flush 5-40 mL  5-40 mL IntraVENous PRN Tesha JACKSON MD        brimonidine (ALPHAGAN) 0.2 % ophthalmic solution 1 Drop  1 Drop Both Eyes TID Wing Osborne MD   1 Drop at 02/24/22 0900    ferrous sulfate tablet 325 mg  325 mg Oral BID Tesha JACKSON MD   325 mg at 02/24/22 0911    insulin lispro (HUMALOG) injection   SubCUTAneous Q6H Tesha JACKSON MD   12 Units at 02/24/22 1147  glucose chewable tablet 16 g  4 Tablet Oral PRN Parker Suárez MD        glucagon (GLUCAGEN) injection 1 mg  1 mg IntraMUSCular PRN Linda JACKSON MD        dextrose 10% infusion 125-250 mL  125-250 mL IntraVENous PRN Parker Suárez MD        metoprolol succinate (TOPROL-XL) XL tablet 50 mg  50 mg Oral DAILY Letha Monahan MD   50 mg at 02/24/22 0911    amLODIPine (NORVASC) tablet 10 mg  10 mg Oral DAILY Letha Monahan MD   10 mg at 02/24/22 4347          Objective     Vital signs for last 24 hours:  Visit Vitals  BP (!) 130/56   Pulse 80   Temp 98 °F (36.7 °C)   Resp 18   Ht 5' 7\" (1.702 m)   Wt 81.6 kg (180 lb)   SpO2 100%   BMI 28.19 kg/m²           Recent Results (from the past 24 hour(s))   GLUCOSE, POC    Collection Time: 02/23/22 11:52 PM   Result Value Ref Range    Glucose (POC) 176 (H) 65 - 117 mg/dL    Performed by Sophie Lynch 258, POC    Collection Time: 02/24/22  7:26 AM   Result Value Ref Range    Glucose (POC) 196 (H) 65 - 117 mg/dL    Performed by Jacinto Dakins    GLUCOSE, POC    Collection Time: 02/24/22 10:53 AM   Result Value Ref Range    Glucose (POC) 324 (H) 65 - 117 mg/dL    Performed by Jacinto Dakins           Intake/Output Summary (Last 24 hours) at 2/24/2022 1717  Last data filed at 2/24/2022 0919  Gross per 24 hour   Intake --   Output 600 ml   Net -600 ml      Current Shift: 02/24 0701 - 02/24 1900  In: -   Out: 600 [Urine:600]  Last 3 Shifts: 02/22 1901 - 02/24 0700  In: -   Out: 1100 [Urine:1100]  Physical Exam  Vitals reviewed. Constitutional:       General: She is not in acute distress. Appearance: Normal appearance. She is obese. Cardiovascular:      Rate and Rhythm: Normal rate and regular rhythm. Heart sounds: Normal heart sounds. No murmur heard. No friction rub. No gallop. Pulmonary:      Effort: Pulmonary effort is normal. No respiratory distress. Breath sounds: Normal breath sounds.    Abdominal:      General: Bowel sounds are normal.      Palpations: Abdomen is soft. Musculoskeletal:         General: No swelling or tenderness. Skin:     General: Skin is warm and dry. Neurological:      Mental Status: She is oriented to person, place, and time. Data Review:   Recent Results (from the past 24 hour(s))   GLUCOSE, POC    Collection Time: 02/23/22 11:52 PM   Result Value Ref Range    Glucose (POC) 176 (H) 65 - 117 mg/dL    Performed by Sophie Lynch 258, POC    Collection Time: 02/24/22  7:26 AM   Result Value Ref Range    Glucose (POC) 196 (H) 65 - 117 mg/dL    Performed by Joaquín George    GLUCOSE, POC    Collection Time: 02/24/22 10:53 AM   Result Value Ref Range    Glucose (POC) 324 (H) 65 - 117 mg/dL    Performed by Marcial 47   Final Result   Penetration with thin consistency. No aspiration. CTA HEAD NECK W WO CONT   Final Result   Negative head and neck CTA. Please note that all carotid bifurcation stenoses are measured as per NASCET   criteria. CT CODE NEURO HEAD WO CONTRAST   Final Result   1. No acute large vessel intracranial ischemia, hemorrhage. Called report to   patient's nurse Zev Hunt at 7:12 PM 2/22/2022.   2. Right basal ganglia lacunar infarct. 3. Periventricular microangiopathy. 4. Pansinusitis. See above. MRI BRAIN WO CONT   Final Result   Acute infarction left paracentral lobule and region of the white   matter of the left motor cortex. CT CODE NEURO HEAD WO CONTRAST   Final Result      1. No acute intracranial hemorrhage. 2. Left basal ganglia lacunar infarct, which is age indeterminate in the absence   of any prior outside studies, but favored to be chronic. 3. Moderate to severe paranasal sinus disease.       Results called to Dr. Wicho Woods at 9:39 PM on 2/21/2022           Patient Active Problem List   Diagnosis Code    HTN (hypertension) I10    Hyperlipidemia E78.5    Neuropathy G62.9    Sciatica M54.30    Diabetes mellitus with neurological manifestations, uncontrolled ([facility]) E11.49, E11.65    Infection of nailbed of toe of left foot L03.032    PAD (peripheral artery disease) ([facility]) I73.9    Hypercalcemia E83.52    DM type 2 causing vascular disease ([facility] E11.59    Type 2 diabetes mellitus with nephropathy ([facility] E11.21    Acute osteomyelitis of ankle or foot ([facility] M86.179    Diabetic peripheral neuropathy ([facility] E11.42    Spinal stenosis in cervical region M48.02    Ischemia of both lower extremities I99.8    Pain in both lower legs M79.661, M79.662    CVA (cerebral vascular accident) ([facility] I63.9    Elevated troponin R77.8        DIAGNOSES:  1. RICARDO ON CKD. . mild grade 1  2. CKD likely stage IIIB  3. Urinary Tract Infection  4. Diabetes Mellitus II with complications  5. CVA( R cerebellar peduncle stroke)    DISCUSSION:   Creatinine on 6/2021 of 1.49 with estimated GFR of 40   Creatinine on 4/2019 of 1.35 with estimated GFR of 45   Likely diabetic nephropathy - need to check proteinuria when stable.  Urine culture positive for gram-negative rods; await further identification    PLAN:   Urine studies   BP at goal for current presentation.  Renal Ultrasound to assess size, location and shape of kidneys to assess severity and chronicity of kidney disease   Advised patient to avoid NSAIDs, and follow-up with nephrologist in clinic        Thanks for consulting me. Please don't hesitate to contact me if any questions arise of if I can assist in any manner. This dictation was done by dragon, computer voice recognition software. Often unanticipated grammatical, syntax, phones and other interpretive errors are inadvertently transcribed. Please excuse errors that have escaped final proofreading. Please contact me if you suspect dictation or transcription errors.   Dr Vaibhav Kang  [address], [address] [facility], [address] [facility]  Cell Phone: [phone]  Office phone: [phone]  Fax: [phone]

## 2022-02-24 NOTE — PROGRESS NOTES
CM noted therapy recommendation for SNF. Met with the patient and her daughter, SALONI, at the bedside to discuss dc planning. SALONI stated she was just in from [facility] and [address] [facility] really should speak with her sister who just went home to rest. She stated she will call her and have her come back this afternoon and they will meet with CM then.

## 2022-02-24 NOTE — PROGRESS NOTES
SPEECH LANGUAGE PATHOLOGY DYSPHAGIA TREATMENT  Patient: Brendan Ramon (39 y.o. female)  Date: 2/24/2022  Diagnosis: CVA (cerebral vascular accident) ([facility] [I63.9]  Elevated troponin [R77.8]      Precautions:      ASSESSMENT :  Patient is A&Ox4 w/ family at bedside. Patient reporting b/l LE pain and bladder pressure. RN notified and immediately at bedside. Extensive education provided to family(daughters) and patient regarding MBS results, diet recs, s/s aspiration, aspiration precautions, swallow strategies, and diet texture modifications. Educated regarding A&P of swallow and implications of dysphagia. Family receptive of education. Rec RD consult for nutritional support. Daughter reported coughing w/ thin while laying down, patient approx 20 degrees, w/ HOB elevated No overt s/s of pen/asp observed w/ thin via straw. Patient continues w/ significant R facial droop resulting in R buccal and labial decreased strength and ROM. Patient w/ slow but improved bolus manipulation from MBS on 2/23/22 w/ minimal oral residue she independently clears. Patient became drowsy and requesting to sleep. Plan for speech evaluation next session. PLAN :  Recommendations and Planned Interventions:  Rec soft and bite sized w/ thin liquids and strict asp/GERD precautions. Rec slow rate of intake, small bites/sips, alternate liquids and solids, double swallow and clear oral cavity b/w bites. Rec speech language evaluation. Frequency/Duration: Patient will be followed by speech-language pathology 5 times a week to address goals. Discharge Recommendations: SNF     SUBJECTIVE:   Patient reports pain in b/l LE, bladder and sacral area. She reports she does not like the ground texture.  .    OBJECTIVE:     Past Medical History:   Diagnosis Date    Diabetes mellitus ([facility]     HTN (hypertension)     Hyperlipidemia     Neuropathy     PAD (peripheral artery disease) ([facility]     PCI    Sciatica        CXR Results  (Last 48 hours) None            Current Diet:  DIET ADULT  DIET ADULT ORAL NUTRITION SUPPLEMENT     Cognitive and Communication Status:  Neurologic State: Alert  Orientation Level: Oriented X4  Cognition: Decreased attention/concentration,Follows commands  Perception: Appears intact  Perseveration: No perseveration noted  Safety/Judgement: Decreased insight into deficits      Pain:  Pain Scale 1: Numeric (0 - 10)  Pain Intensity 1: 6  Pain Location 1: Abdomen    After treatment:   Patient left in no apparent distress in bed, Call bell within reach, Nursing notified, Caregiver / family present, and Bed / chair alarm activated    COMMUNICATION/EDUCATION:   Patient and family receptive of extensive education.      Thank you for this referral.  Nick Morrison M.S., M.Ed., CCC-SLP  Time Calculation: 48 mins

## 2022-02-24 NOTE — PROGRESS NOTES
Consult Date: 2/24/2022    Consults    Subjective   HISTORY OF PRESENTING ILLNESS     Radha Beckett is a 29-year-old female with history of diabetes mellitus, hypertension, hyperlipidemia, PAD and history of recent stroke presented to the ED via EMS due to altered mental status and slurred speech. Patient was seen at [facility] about 2 weeks ago for stroke. Patient was recently on prednisone, otherwise denies new medications. Patient is unsure if she received contrast in her workup at [facility]. Patient did undergo head neck CT with and without contrast during her initial workup at [address] [place] [address]. Patient utilizes walker at home for assistance at baseline. Patient was seen and examined today for evaluation of abnormal kidney function. Patient's daughter, Rosalinda Bobo, was present at bedside. Patient denies personal history of kidney disease or kidney stones. Patient takes ibuprofen and tylenol at home as needed for pain. Review of Systems   Constitutional: Negative for chills and fever. HENT: Negative for congestion, ear discharge, ear pain and sinus pain. Eyes: Negative for pain and itching. Respiratory: Negative for cough and wheezing. Cardiovascular: Negative for chest pain. Gastrointestinal: Negative for abdominal pain, constipation, diarrhea, nausea and vomiting. Genitourinary: Negative for difficulty urinating, flank pain, hematuria and urgency. Musculoskeletal: Negative for joint swelling and myalgias. Neurological: Positive for weakness. Negative for dizziness, light-headedness and headaches. Hematological: Negative for adenopathy. Does not bruise/bleed easily.        Past Medical History:   Diagnosis Date    Diabetes mellitus ([facility] [address]     HTN (hypertension)     Hyperlipidemia     Neuropathy     PAD (peripheral artery disease) (HCC)     PCI    Sciatica       Past Surgical History:   Procedure Laterality Date    HX AMPUTATION Right     great toe    HX CERVICAL FUSION      HX OTHER SURGICAL Bilateral Stent placement    HX OTHER SURGICAL      HX VASCULAR STENT Bilateral     2 in right 1 in left    HX VASCULAR STENT Bilateral      Family History   Problem Relation Age of Onset    Cancer Mother     Diabetes Mother     Hypertension Mother       Social History     Tobacco Use    Smoking status: Never Smoker    Smokeless tobacco: Never Used   Substance Use Topics    Alcohol use: Not Currently       Current Facility-Administered Medications   Medication Dose Route Frequency Provider Last Rate Last Admin    HYDROcodone-acetaminophen (NORCO) 5-325 mg per tablet 1 Tablet  1 Tablet Oral Q4H PRN Vance JACKSON MD   1 Tablet at 02/24/22 1429    rivaroxaban (XARELTO) tablet 2.5 mg  2.5 mg Oral BID WITH MEALS Caity Brito MD   2.5 mg at 02/24/22 0911    phenazopyridine (PYRIDIUM) tab 95 mg  95 mg Oral DAILY Vance JACKSON MD   95 mg at 02/23/22 1651    aspirin chewable tablet 81 mg  81 mg Oral DAILY Caity Brito MD   81 mg at 02/24/22 0911    atorvastatin (LIPITOR) tablet 80 mg  80 mg Oral DAILY Caity Brito MD   80 mg at 02/24/22 0910    labetaloL (NORMODYNE;TRANDATE) 20 mg/4 mL (5 mg/mL) injection 10 mg  10 mg IntraVENous Q6H PRN Caity Brito MD        sodium chloride (NS) flush 5-40 mL  5-40 mL IntraVENous Q8H Vance JACKSON MD   10 mL at 02/24/22 1333    sodium chloride (NS) flush 5-40 mL  5-40 mL IntraVENous PRN Vance JACKSON MD        brimonidine (ALPHAGAN) 0.2 % ophthalmic solution 1 Drop  1 Drop Both Eyes TID Brenda Mora MD   1 Drop at 02/24/22 0900    ferrous sulfate tablet 325 mg  325 mg Oral BID Vance JACKSON MD   325 mg at 02/24/22 0911    insulin lispro (HUMALOG) injection   SubCUTAneous Q6H Vance JACKSON MD   12 Units at 02/24/22 1147    glucose chewable tablet 16 g  4 Tablet Oral PRN Brenda Mora MD        glucagon (GLUCAGEN) injection 1 mg  1 mg IntraMUSCular PRN Vance JACKSON MD        dextrose 10% infusion 125-250 mL  125-250 mL IntraVENous PRN Krystyna Trevino MD        metoprolol succinate (TOPROL-XL) XL tablet 50 mg  50 mg Oral DAILY Jimena Donald MD   50 mg at 02/24/22 0911    amLODIPine (NORVASC) tablet 10 mg  10 mg Oral DAILY Jimena Donald MD   10 mg at 02/24/22 0910          Objective     Vital signs for last 24 hours:  Visit Vitals  BP (!) 130/56   Pulse 80   Temp 98 °F (36.7 °C)   Resp 18   Ht 5' 7\" (1.702 m)   Wt 81.6 kg (180 lb)   SpO2 100%   BMI 28.19 kg/m²           Recent Results (from the past 24 hour(s))   GLUCOSE, POC    Collection Time: 02/23/22 11:52 PM   Result Value Ref Range    Glucose (POC) 176 (H) 65 - 117 mg/dL    Performed by Sophie Lynch 258, POC    Collection Time: 02/24/22  7:26 AM   Result Value Ref Range    Glucose (POC) 196 (H) 65 - 117 mg/dL    Performed by [employer]    GLUCOSE, POC    Collection Time: 02/24/22 10:53 AM   Result Value Ref Range    Glucose (POC) 324 (H) 65 - 117 mg/dL    Performed by [employer]           Intake/Output Summary (Last 24 hours) at 2/24/2022 1523  Last data filed at 2/24/2022 0919  Gross per 24 hour   Intake --   Output 600 ml   Net -600 ml      Current Shift: 02/24 0701 - 02/24 1900  In: -   Out: 600 [Urine:600]  Last 3 Shifts: 02/22 1901 - 02/24 0700  In: -   Out: 1100 [Urine:1100]  Physical Exam  Vitals reviewed. Constitutional:       General: She is not in acute distress. Appearance: Normal appearance. She is obese. Cardiovascular:      Rate and Rhythm: Normal rate and regular rhythm. Heart sounds: Normal heart sounds. No murmur heard. No friction rub. No gallop. Pulmonary:      Effort: Pulmonary effort is normal. No respiratory distress. Breath sounds: Normal breath sounds. Abdominal:      General: Bowel sounds are normal.      Palpations: Abdomen is soft. Musculoskeletal:         General: No swelling or tenderness. Skin:     General: Skin is warm and dry.    Neurological:      Mental Status: She is oriented to person, place, and time.          Data Review:   Recent Results (from the past 24 hour(s))   GLUCOSE, POC    Collection Time: 02/23/22 11:52 PM   Result Value Ref Range    Glucose (POC) 176 (H) 65 - 117 mg/dL    Performed by Sophie Lynch 258, POC    Collection Time: 02/24/22  7:26 AM   Result Value Ref Range    Glucose (POC) 196 (H) 65 - 117 mg/dL    Performed by Nasima Navarrete    GLUCOSE, POC    Collection Time: 02/24/22 10:53 AM   Result Value Ref Range    Glucose (POC) 324 (H) 65 - 117 mg/dL    Performed by Marcial 47   Final Result   Penetration with thin consistency. No aspiration. CTA HEAD NECK W WO CONT   Final Result   Negative head and neck CTA. Please note that all carotid bifurcation stenoses are measured as per NASCET   criteria. CT CODE NEURO HEAD WO CONTRAST   Final Result   1. No acute large vessel intracranial ischemia, hemorrhage. Called report to   patient's nurse Marek Dailey at 7:12 PM 2/22/2022.   2. Right basal ganglia lacunar infarct. 3. Periventricular microangiopathy. 4. Pansinusitis. See above. MRI BRAIN WO CONT   Final Result   Acute infarction left paracentral lobule and region of the white   matter of the left motor cortex. CT CODE NEURO HEAD WO CONTRAST   Final Result      1. No acute intracranial hemorrhage. 2. Left basal ganglia lacunar infarct, which is age indeterminate in the absence   of any prior outside studies, but favored to be chronic. 3. Moderate to severe paranasal sinus disease.       Results called to Dr. Suyapa Reyes at 9:39 PM on 2/21/2022           Patient Active Problem List   Diagnosis Code    HTN (hypertension) I10    Hyperlipidemia E78.5    Neuropathy G62.9    Sciatica M54.30    Diabetes mellitus with neurological manifestations, uncontrolled ([facility] [address] E11.49, E11.65    Infection of nailbed of toe of left foot L03.032    PAD (peripheral artery disease) (HCC) I73.9    Hypercalcemia E83.52    DM type 2 causing vascular disease ([facility] [address] E11.59    Type 2 diabetes mellitus with nephropathy ([facility] [address] E11.21    Acute osteomyelitis of ankle or foot ([facility] [address] M86.179    Diabetic peripheral neuropathy ([facility] [address] E11.42    Spinal stenosis in cervical region M48.02    Ischemia of both lower extremities I99.8    Pain in both lower legs M79.661, M79.662    CVA (cerebral vascular accident) ([facility] [address] I63.9    Elevated troponin R77.8        DIAGNOSES:  1. RICARDO Grade 1  2. CKD likely stage IIIB  3. Urinary Tract Infection  4. Diabetes Mellitus II with complications  5. CVA    DISCUSSION:   Creatinine on 6/2021 of 1.49 with estimated GFR of 40   Creatinine on 4/2019 of 1.35 with estimated GFR of 45   Likely diabetic nephropathy due to long-term progression   Urine culture positive for gram-negative rods    PLAN:   Renal Function Panel: need updated labs to better stage kidney disease and follow trend in creatinine   Renal Ultrasound to assess size, location and shape of kidneys to assess severity and chronicity of kidney disease   Advised patient to avoid NSAIDs, and follow-up with nephrologist in clinic        Thanks for consulting me. Please don't hesitate to contact me if any questions arise of if I can assist in any manner. This dictation was done by dragon, computer voice recognition software. Often unanticipated grammatical, syntax, phones and other interpretive errors are inadvertently transcribed. Please excuse errors that have escaped final proofreading. Please contact me if you suspect dictation or transcription errors.   Dr Katt Brown  [address] [place] [address], [facility], [address] [place] [address]  Cell Phone: [phone]  Office phone: [phone]  Fax: [phone]

## 2022-02-24 NOTE — CONSULTS
Consult Date: 2/24/2022    Consults Ms. Marlen Sibley is a 78year old woman with HTN, DM who had a stroke last week with right facial droop and right sided weakness worked up in [facility] who was brought in for worsening speech and left sided weakness per daughters at bedside. She is living at home and uses a walker to ambulate at baseline. Ct head done yesterday shows old basal ganglia stroke. Her BP are elevated to 538I systolic. Subjective    2/24/22: patient has pelvic pain and is writhing. She says \"it hurts to pee\". Has a pure wick in which is draining dark urine (on pyridium)    2/23/22: patient had some disorientation and left leg weakness last night. Code neuro was called and CTA head and neck done which showed no LVO. Left leg weakness resolved and mental status back to baseline. Reviewed records from [facility] admission. She had a right cerebellar peduncle stroke. TTE was normal with EF 60-65%.      Past Medical History:   Diagnosis Date    Diabetes mellitus ([facility] [address]     HTN (hypertension)     Hyperlipidemia     Neuropathy     PAD (peripheral artery disease) (HCC)     PCI    Sciatica       Past Surgical History:   Procedure Laterality Date    HX AMPUTATION Right     great toe    HX CERVICAL FUSION      HX OTHER SURGICAL Bilateral     Stent placement    HX OTHER SURGICAL      HX VASCULAR STENT Bilateral     2 in right 1 in left    HX VASCULAR STENT Bilateral      Family History   Problem Relation Age of Onset    Cancer Mother     Diabetes Mother     Hypertension Mother       Social History     Tobacco Use    Smoking status: Never Smoker    Smokeless tobacco: Never Used   Substance Use Topics    Alcohol use: Not Currently       Current Facility-Administered Medications   Medication Dose Route Frequency Provider Last Rate Last Admin    rivaroxaban (XARELTO) tablet 2.5 mg  2.5 mg Oral BID WITH MEALS Donald Branch MD   2.5 mg at 02/23/22 1651    phenazopyridine (PYRIDIUM) tab 95 mg  95 mg Oral DAILY Phan Ricks MD   95 mg at 02/23/22 1651    aspirin chewable tablet 81 mg  81 mg Oral DAILY Sixto Lyman MD   81 mg at 02/23/22 1054    atorvastatin (LIPITOR) tablet 80 mg  80 mg Oral DAILY Sixto Lyman MD   80 mg at 02/23/22 0905    labetaloL (NORMODYNE;TRANDATE) 20 mg/4 mL (5 mg/mL) injection 10 mg  10 mg IntraVENous Q6H PRN Sixto Lyman MD        sodium chloride (NS) flush 5-40 mL  5-40 mL IntraVENous Q8H Orestes Norman MD   10 mL at 02/23/22 2354    sodium chloride (NS) flush 5-40 mL  5-40 mL IntraVENous PRN Analia JACKSON MD        brimonidine (ALPHAGAN) 0.2 % ophthalmic solution 1 Drop  1 Drop Both Eyes TID Phan Ricks MD   1 Drop at 02/23/22 1600    ferrous sulfate tablet 325 mg  325 mg Oral BID Analia JACKSON MD   325 mg at 02/23/22 2312    insulin lispro (HUMALOG) injection   SubCUTAneous Q6H Analia JACKSON MD   3 Units at 02/24/22 0050    glucose chewable tablet 16 g  4 Tablet Oral PRN Phan Ricks MD        glucagon (GLUCAGEN) injection 1 mg  1 mg IntraMUSCular PRN Analia JACKSON MD        dextrose 10% infusion 125-250 mL  125-250 mL IntraVENous PRN Analia JACKSON MD        metoprolol succinate (TOPROL-XL) XL tablet 50 mg  50 mg Oral DAILY Baron Sandrine MD   50 mg at 02/23/22 0905    amLODIPine (NORVASC) tablet 10 mg  10 mg Oral DAILY Baron Sandrine MD   10 mg at 02/23/22 2360        Review of Systems   Neurological: Positive for facial asymmetry and weakness. All other systems reviewed and are negative. Objective     Vital signs for last 24 hours:  Visit Vitals  BP (!) 153/64 (BP 1 Location: Left upper arm, BP Patient Position: At rest)   Pulse 74   Temp 98.1 °F (36.7 °C)   Resp 16   Ht 5' 7\" (1.702 m)   Wt 81.6 kg (180 lb)   SpO2 98%   BMI 28.19 kg/m²       Intake/Output this shift:  Current Shift: No intake/output data recorded.   Last 3 Shifts: 02/22 1901 - 02/24 0700  In: -   Out: 1100 [Urine:1100]    Data Review:   Recent Results (from the past 24 hour(s))   GLUCOSE, POC    Collection Time: 02/23/22 11:04 AM   Result Value Ref Range    Glucose (POC) 230 (H) 65 - 117 mg/dL    Performed by Flaquita Bo (Trvlr)    GLUCOSE, POC    Collection Time: 02/23/22  3:19 PM   Result Value Ref Range    Glucose (POC) 276 (H) 65 - 117 mg/dL    Performed by Flaquita Bo (Trvlr)    GLUCOSE, POC    Collection Time: 02/23/22 11:52 PM   Result Value Ref Range    Glucose (POC) 176 (H) 65 - 117 mg/dL    Performed by Sophie Lynch 258, POC    Collection Time: 02/24/22  7:26 AM   Result Value Ref Range    Glucose (POC) 196 (H) 65 - 117 mg/dL    Performed by Thang Hillman        Physical Exam    Neuro Physical Exam      General: Well developed, well nourished. Patient in no apparent distress. Cardiac: Regular rate and rhythm with no murmurs. Neurological Exam:  Mental Status: Awake, alert, oriented x4, mild dysarthria    Cranial Nerves:   Intact visual fields. PERRL, EOM's full, no nystagmus, no ptosis. Facial sensation is normal. Right upper and lower facial droop. Palate is midline. Normal sternocleidomastoid strength. Tongue is midline. Hearing is intact bilaterally. Motor:  5/5 strength in upper ext, RLE: 2/5 hip flexion, LLE: 3/5 hip flexion   Reflexes:   Deep tendon reflexes 2+/4 and symmetrical.   Sensory:   Normal to light touch throughout   Gait:  Not tested (uses walker at baseline)   Tremor:   No tremor noted. Cerebellar:  No cerebellar signs present. Babinski:      Down on left, amputated toe on right     Assessment and Plan: Ms. Astrid Diaz is a 78year old woman with recent stroke causing right facial droop and right sided weakness per daughters and patient. She had a fill stroke workup done at [facility] last week. Will not repeat everything here. Request records from [facility]. - Mri brain w/o contrast shows new left paracentral region and multiple old strokes  - BP goal < 150/90.  On metoprolol and amlodipine  - cont asa 81mg,  atorvastatin 80mg, switch plavix to xarelto 2.5 mg bid given she still had a stroke while on asa and plavix. - TTE already done, report in paper chart.  Will not repeat  - swallow eval: on modified diet  - cardiac monitoring  -pt/ot    Discussed care and plan with daughter on phone

## 2022-02-25 NOTE — PROGRESS NOTES
Pt sent to Ct for scan of the head, pt return with CBI restarted. Pt talking, back to baseline. Daughter at bedside.

## 2022-02-25 NOTE — PROGRESS NOTES
Pt receiving CBI, pt noted to be starring again and not responding to name when called. Rapid response called and pt sent to .

## 2022-02-25 NOTE — PROGRESS NOTES
A rapid response was called on patient for change in mentation. On my evaluation, she appears confused, and in pain. She was not following command. Physical exam significant for distended abdomen. Patient is on blood thinner. Giraldo appears clogged. Differential diagnosis: urinary obstruction leading to uremia and altered mental status, retroperitoneal bleed, hemorrhagic conversion of stroke, new onset seizure    Plan:  - Flush giraldo  - Check BMP for uremia  - CT head and abdomen w/o contrast  - Ativan and loading dose keppra given  - EEG  - transfer to ICU. I spent 40 minutes critical care time attending to this patient, including direct patient care, coordination of care with nurses, reviewing charts, imaging and results.

## 2022-02-25 NOTE — PROGRESS NOTES
Call placed to Dr. Jason Saucedo regarding pt C02 13. Dr. Jason Saucedo ordered 1 amp bicarb and repeat BMP.

## 2022-02-25 NOTE — PROGRESS NOTES
Progress Note      2/25/2022 9:05 AM  NAME: Nazario Arteaga   MRN:  [id number]   Admit Diagnosis: CVA (cerebral vascular accident) ([facility] [address] [I63.9]  Elevated troponin [R77.8]        Assessment/Plan:   Decreasing mentation,? Secondary to pain, daughter at bedside reports mentation had improved though she is sleepy this morning. CT brain without acute pathology    Troponin leak, downtrending, daughter at bedside denies having any cardiac symptoms in the hospital.  Will repeat EKG. Hypertension, well controlled on current regimen    Renal disease, rising creatinine    Bladder hemorrhage/hematuria    Anemia, stable         []       High complexity decision making was performed in this patient at high risk for decompensation with multiple organ involvement. Subjective:     Nazario Arteaga is poorly responsive   discussed with RN events overnight. Review of Systems:    Symptom Y/N Comments  Symptom Y/N Comments   Fever/Chills N   Chest Pain N    Poor Appetite N   Edema N    Cough N   Abdominal Pain N    Sputum N   Joint Pain N    SOB/SARGENT N   Pruritis/Rash N    Nausea/vomit N   Tolerating PT/OT Y    Diarrhea N   Tolerating Diet Y    Constipation N   Other       Could NOT obtain due to:      Objective:      Physical Exam:    Last 24hrs VS reviewed since prior progress note. Most recent are:    Visit Vitals  /74   Pulse 63   Temp 97.4 °F (36.3 °C)   Resp 20   Ht 5' 7\" (1.702 m)   Wt 81.6 kg (180 lb)   SpO2 100%   BMI 28.19 kg/m²       Intake/Output Summary (Last 24 hours) at 2/25/2022 5010  Last data filed at 2/25/2022 0600  Gross per 24 hour   Intake 26832 ml   Output 76274 ml   Net 3016 ml        General Appearance: Overweight female, eyes open, not answering questions  Ears/Nose/Mouth/Throat:  moist mucous membranes  Neck: Supple. Chest: Lungs rare rhonchi. Cardiovascular: Regular rate and rhythm, S1S2 normal, no murmur. Abdomen: Soft, non-tender, bowel sounds are active.   Extremities: No edema bilaterally. Skin: Warm and dry. []         Post-cath site without hematoma, bruit, tenderness, or thrill. Distal pulses intact. PMH/ reviewed - no change compared to H&P    Data Review    Telemetry:     EKG:   []  No new EKG for review    Lab Data Personally Reviewed:    Recent Labs     02/25/22 0224 02/25/22 0009   WBC 13.2* 14.1*   HGB 8.4* 7.5*   HCT 28.0* 25.5*   * 501*     No results for input(s): INR, PTP, APTT, INREXT in the last 72 hours. Recent Labs     02/25/22 0224 02/25/22 0009 02/22/22  1157    137 141   K 5.3* 5.6* 4.2   * 109* 110*   CO2 13* 13* 21   BUN 58* 54* 63*   CREA 2.71* 2.27* 1.64*   * 279* 190*   CA 9.3 9.5 9.0     No results for input(s): CPK, CKNDX, TROIQ in the last 72 hours. No lab exists for component: CPKMB  Lab Results   Component Value Date/Time    Cholesterol, total 213 (H) 09/25/2019 02:22 PM    HDL Cholesterol 39 (L) 09/25/2019 02:22 PM    LDL,Direct 79 06/28/2021 12:00 AM    LDL, calculated 135 (H) 09/25/2019 02:22 PM    Triglyceride 194 (H) 09/25/2019 02:22 PM       No results for input(s): AP, TBIL, TP, ALB, GLOB, GGT, AML, LPSE in the last 72 hours. No lab exists for component: SGOT, GPT, AMYP, HLPSE  No results for input(s): PH, PCO2, PO2 in the last 72 hours.     Medications Personally Reviewed:    Current Facility-Administered Medications   Medication Dose Route Frequency    gabapentin (NEURONTIN) capsule 100 mg  100 mg Oral DAILY    LORazepam (ATIVAN) 2 mg/mL injection        0.9% sodium chloride infusion  100 mL/hr IntraVENous CONTINUOUS    HYDROcodone-acetaminophen (NORCO) 5-325 mg per tablet 1 Tablet  1 Tablet Oral Q4H PRN    cefTRIAXone (ROCEPHIN) 1 g in sterile water (preservative free) 10 mL IV syringe  1 g IntraVENous Q24H    dicyclomine (BENTYL) capsule 10 mg  10 mg Oral TID    lidocaine (XYLOCAINE) 4 % (40 mg/mL) topical solution   Topical PRN    [Held by provider] rivaroxaban (XARELTO) tablet 2.5 mg  2.5 mg Oral BID WITH MEALS    atorvastatin (LIPITOR) tablet 80 mg  80 mg Oral DAILY    labetaloL (NORMODYNE;TRANDATE) 20 mg/4 mL (5 mg/mL) injection 10 mg  10 mg IntraVENous Q6H PRN    sodium chloride (NS) flush 5-40 mL  5-40 mL IntraVENous Q8H    sodium chloride (NS) flush 5-40 mL  5-40 mL IntraVENous PRN    brimonidine (ALPHAGAN) 0.2 % ophthalmic solution 1 Drop  1 Drop Both Eyes TID    ferrous sulfate tablet 325 mg  325 mg Oral BID    insulin lispro (HUMALOG) injection   SubCUTAneous Q6H    glucose chewable tablet 16 g  4 Tablet Oral PRN    glucagon (GLUCAGEN) injection 1 mg  1 mg IntraMUSCular PRN    dextrose 10% infusion 125-250 mL  125-250 mL IntraVENous PRN    metoprolol succinate (TOPROL-XL) XL tablet 50 mg  50 mg Oral DAILY    amLODIPine (NORVASC) tablet 10 mg  10 mg Oral DAILY         Boston Roa MD

## 2022-02-25 NOTE — PROGRESS NOTES
Pt resting in bed with daughter at bedside, pt starring in one direction. She does not respond to her name when called. Pt continues to receive CBI with pinkish red fluid draining. Rapid response called.

## 2022-02-25 NOTE — CONSULTS
Consult Date: 2/25/2022    Consults Ms. Julio Mcgee is a 78year old woman with HTN, DM who had a stroke last week with right facial droop and right sided weakness worked up in [facility] who was brought in for worsening speech and left sided weakness per daughters at bedside. She is living at home and uses a walker to ambulate at baseline. Ct head done yesterday shows old basal ganglia stroke. Her BP are elevated to 004U systolic. Subjective    2/25/22: patient was a rapid response because she was staring ,not responding with stiff extremities. This is per daughter at bedside. She was given ativan and transferred to ICU. Ct head was unrevealing. Also developed hematuria so ASA and xarelto for stroke prophylaxis were held. Very lethargic this morninf and not responding  2/24/22: patient has pelvic pain and is writhing. She says \"it hurts to pee\". Has a pure wick in which is draining dark urine (on pyridium)    2/23/22: patient had some disorientation and left leg weakness last night. Code neuro was called and CTA head and neck done which showed no LVO. Left leg weakness resolved and mental status back to baseline. Reviewed records from [facility] admission. She had a right cerebellar peduncle stroke. TTE was normal with EF 60-65%.      Past Medical History:   Diagnosis Date    Diabetes mellitus ([facility] [address]     HTN (hypertension)     Hyperlipidemia     Neuropathy     PAD (peripheral artery disease) (HCC)     PCI    Sciatica       Past Surgical History:   Procedure Laterality Date    HX AMPUTATION Right     great toe    HX CERVICAL FUSION      HX OTHER SURGICAL Bilateral     Stent placement    HX OTHER SURGICAL      HX VASCULAR STENT Bilateral     2 in right 1 in left    HX VASCULAR STENT Bilateral      Family History   Problem Relation Age of Onset    Cancer Mother     Diabetes Mother     Hypertension Mother       Social History     Tobacco Use    Smoking status: Never Smoker    Smokeless tobacco: Never Used   Substance Use Topics    Alcohol use: Not Currently       Current Facility-Administered Medications   Medication Dose Route Frequency Provider Last Rate Last Admin    gabapentin (NEURONTIN) capsule 100 mg  100 mg Oral DAILY Lorraine JACKSON MD   100 mg at 02/25/22 0112    LORazepam (ATIVAN) 2 mg/mL injection             0.9% sodium chloride infusion  100 mL/hr IntraVENous CONTINUOUS Shahid Matthews  mL/hr at 02/25/22 0400 100 mL/hr at 02/25/22 0400    HYDROcodone-acetaminophen (NORCO) 5-325 mg per tablet 1 Tablet  1 Tablet Oral Q4H PRN Lorraine JACKSON MD   1 Tablet at 02/24/22 2303    cefTRIAXone (ROCEPHIN) 1 g in sterile water (preservative free) 10 mL IV syringe  1 g IntraVENous Q24H Lorraine JACKSON MD   1 g at 02/24/22 1718    dicyclomine (BENTYL) capsule 10 mg  10 mg Oral TID Lorraine JACKSON MD   10 mg at 02/24/22 2114    lidocaine (XYLOCAINE) 4 % (40 mg/mL) topical solution   Topical PRN Lorraine JACKSON MD   1 mL at 02/24/22 2136    [Held by provider] rivaroxaban (Fredi Galvez) tablet 2.5 mg  2.5 mg Oral BID WITH MEALS Gaurav Merida MD   2.5 mg at 02/24/22 1720    atorvastatin (LIPITOR) tablet 80 mg  80 mg Oral DAILY Gaurav Merida MD   80 mg at 02/24/22 0910    labetaloL (NORMODYNE;TRANDATE) 20 mg/4 mL (5 mg/mL) injection 10 mg  10 mg IntraVENous Q6H PRN Gaurav Merida MD        sodium chloride (NS) flush 5-40 mL  5-40 mL IntraVENous Q8H Orestes Norman MD   10 mL at 02/25/22 0542    sodium chloride (NS) flush 5-40 mL  5-40 mL IntraVENous PRN Lorraine JACKSON MD        brimonidine (ALPHAGAN) 0.2 % ophthalmic solution 1 Drop  1 Drop Both Eyes TID Vana Krabbe, MD   1 Drop at 02/24/22 2044    ferrous sulfate tablet 325 mg  325 mg Oral BID Lorraine JACKSON MD   325 mg at 02/24/22 2044    insulin lispro (HUMALOG) injection   SubCUTAneous Q6H Lorraine JACKSON MD   9 Units at 02/25/22 0045    glucose chewable tablet 16 g  4 Tablet Oral PRN Vana Krabbe, MD        glucagon (GLUCAGEN) injection 1 mg  1 mg IntraMUSCular PRN Isi JACKSON MD        dextrose 10% infusion 125-250 mL  125-250 mL IntraVENous PRN Isi JACKSON MD        metoprolol succinate (TOPROL-XL) XL tablet 50 mg  50 mg Oral DAILY Gilmar Whitten MD   50 mg at 02/24/22 0911    amLODIPine (NORVASC) tablet 10 mg  10 mg Oral DAILY Gilmar Whitten MD   10 mg at 02/24/22 6406        Review of Systems   Neurological: Positive for facial asymmetry and weakness. All other systems reviewed and are negative. Objective     Vital signs for last 24 hours:  Visit Vitals  /74   Pulse 63   Temp 97.4 °F (36.3 °C)   Resp 20   Ht 5' 7\" (1.702 m)   Wt 81.6 kg (180 lb)   SpO2 100%   BMI 28.19 kg/m²       Intake/Output this shift:  Current Shift: No intake/output data recorded.   Last 3 Shifts: 02/23 1901 - 02/25 0700  In: 18047   Out: 41161 [Urine:56835]    Data Review:   Recent Results (from the past 24 hour(s))   GLUCOSE, POC    Collection Time: 02/24/22 10:53 AM   Result Value Ref Range    Glucose (POC) 324 (H) 65 - 117 mg/dL    Performed by Isabel Zhong    GLUCOSE, POC    Collection Time: 02/24/22  6:28 PM   Result Value Ref Range    Glucose (POC) 190 (H) 65 - 117 mg/dL    Performed by Isabel Zhong    GLUCOSE, POC    Collection Time: 02/24/22  9:47 PM   Result Value Ref Range    Glucose (POC) 353 (H) 65 - 117 mg/dL    Performed by [facility]    METABOLIC PANEL, BASIC    Collection Time: 02/25/22 12:09 AM   Result Value Ref Range    Sodium 137 136 - 145 mmol/L    Potassium 5.6 (H) 3.5 - 5.1 mmol/L    Chloride 109 (H) 97 - 108 mmol/L    CO2 13 (LL) 21 - 32 mmol/L    Anion gap 15 5 - 15 mmol/L    Glucose 279 (H) 65 - 100 mg/dL    BUN 54 (H) 6 - 20 mg/dL    Creatinine 2.27 (H) 0.55 - 1.02 mg/dL    BUN/Creatinine ratio 24 (H) 12 - 20      GFR est AA 25 (L) >60 ml/min/1.73m2    GFR est non-AA 21 (L) >60 ml/min/1.73m2    Calcium 9.5 8.5 - 10.1 mg/dL   CBC WITH AUTOMATED DIFF    Collection Time: 02/25/22 12:09 AM   Result Value Ref Range    WBC 14.1 (H) 3.6 - 11.0 K/uL    RBC 2.65 (L) 3.80 - 5.20 M/uL    HGB 7.5 (L) 11.5 - 16.0 g/dL    HCT 25.5 (L) 35.0 - 47.0 %    MCV 96.2 80.0 - 99.0 FL    MCH 28.3 26.0 - 34.0 PG    MCHC 29.4 (L) 30.0 - 36.5 g/dL    RDW 17.3 (H) 11.5 - 14.5 %    PLATELET 701 (H) 951 - 400 K/uL    MPV 10.4 8.9 - 12.9 FL    NRBC 0.0 0.0  WBC    ABSOLUTE NRBC 0.00 0.00 - 0.01 K/uL    NEUTROPHILS 83 (H) 32 - 75 %    LYMPHOCYTES 16 12 - 49 %    MONOCYTES 0 (L) 5 - 13 %    EOSINOPHILS 1 0 - 7 %    BASOPHILS 0 0 - 1 %    IMMATURE GRANULOCYTES 0 %    ABS. NEUTROPHILS 11.7 (H) 1.8 - 8.0 K/UL    ABS. LYMPHOCYTES 2.3 0.8 - 3.5 K/UL    ABS. MONOCYTES 0.0 0.0 - 1.0 K/UL    ABS. EOSINOPHILS 0.1 0.0 - 0.4 K/UL    ABS. BASOPHILS 0.0 0.0 - 0.1 K/UL    ABS. IMM.  GRANS. 0.0 K/UL    DF Manual      PLATELET COMMENTS Large Platelets      RBC COMMENTS Polychromasia  1+       GLUCOSE, POC    Collection Time: 02/25/22  1:23 AM   Result Value Ref Range    Glucose (POC) 219 (H) 65 - 117 mg/dL    Performed by Liberty Soria (Trvl[facility])    METABOLIC PANEL, BASIC    Collection Time: 02/25/22  2:24 AM   Result Value Ref Range    Sodium 141 136 - 145 mmol/L    Potassium 5.3 (H) 3.5 - 5.1 mmol/L    Chloride 111 (H) 97 - 108 mmol/L    CO2 13 (LL) 21 - 32 mmol/L    Anion gap 17 (H) 5 - 15 mmol/L    Glucose 155 (H) 65 - 100 mg/dL    BUN 58 (H) 6 - 20 mg/dL    Creatinine 2.71 (H) 0.55 - 1.02 mg/dL    BUN/Creatinine ratio 21 (H) 12 - 20      GFR est AA 21 (L) >60 ml/min/1.73m2    GFR est non-AA 17 (L) >60 ml/min/1.73m2    Calcium 9.3 8.5 - 10.1 mg/dL   CBC WITH AUTOMATED DIFF    Collection Time: 02/25/22  2:24 AM   Result Value Ref Range    WBC 13.2 (H) 3.6 - 11.0 K/uL    RBC 2.99 (L) 3.80 - 5.20 M/uL    HGB 8.4 (L) 11.5 - 16.0 g/dL    HCT 28.0 (L) 35.0 - 47.0 %    MCV 93.6 80.0 - 99.0 FL    MCH 28.1 26.0 - 34.0 PG    MCHC 30.0 30.0 - 36.5 g/dL    RDW 17.2 (H) 11.5 - 14.5 %    PLATELET 560 (H) 952 - 400 K/uL    MPV 10.4 8.9 - 12.9 FL    NRBC 0.3 (H) 0.0  WBC    ABSOLUTE NRBC 0.04 (H) 0.00 - 0.01 K/uL    NEUTROPHILS 90 (H) 32 - 75 %    LYMPHOCYTES 7 (L) 12 - 49 %    MONOCYTES 1 (L) 5 - 13 %    EOSINOPHILS 0 0 - 7 %    BASOPHILS 0 0 - 1 %    IMMATURE GRANULOCYTES 2 (H) 0 - 0.5 %    ABS. NEUTROPHILS 11.8 (H) 1.8 - 8.0 K/UL    ABS. LYMPHOCYTES 1.0 0.8 - 3.5 K/UL    ABS. MONOCYTES 0.1 0.0 - 1.0 K/UL    ABS. EOSINOPHILS 0.0 0.0 - 0.4 K/UL    ABS. BASOPHILS 0.0 0.0 - 0.1 K/UL    ABS. IMM. GRANS. 0.2 (H) 0.00 - 0.04 K/UL    DF AUTOMATED     GLUCOSE, POC    Collection Time: 02/25/22  2:52 AM   Result Value Ref Range    Glucose (POC) 132 (H) 65 - 117 mg/dL    Performed by [facility], POC    Collection Time: 02/25/22  5:45 AM   Result Value Ref Range    Glucose (POC) 85 65 - 117 mg/dL    Performed by Carol Juárez        Physical Exam    Neuro Physical Exam      General: Well developed, well nourished. Patient in no apparent distress. Cardiac: Regular rate and rhythm with no murmurs. Neurological Exam:  Mental Status: Eyes open, not responding, not following commands. Babinski down b/l                                        Assessment and Plan: Ms. Serge Garcias is a 78year old woman with recent stroke causing right facial droop and right sided weakness per daughters and patient. She had a fill stroke workup done at [facility] last week. Will not repeat everything here. Request records from [facility]. Now having lethargy probably due to ativan given. Has medical issues as well including RICARDO, UTI.     - Mri brain w/o contrast shows new left paracentral region and multiple old strokes  - BP goal < 150/90. On metoprolol and amlodipine  -   atorvastatin 80mg, HELD Asa and xarelto (2.5 mg bid for stroke prophylaxis). When hematuria resolves start back asa 81mg daily only. - TTE already done, report in paper chart.  Will not repeat  - swallow eval: on modified diet  - cardiac monitoring  -pt/ot    Her event last night is not clearly a seizure. Will monitor. No EEG and no Anti-epileptics.      Will continue to follow

## 2022-02-25 NOTE — PROGRESS NOTES
UROLOGY Progress Note         [phone]      Daily Progress Note: 2/25/2022      Subjective: The patient is seen for UROLOGIC follow up for urinary tract infection and gross hematuria  Patient is a 62-year-old female with history of high blood pressure, diabetes, recent stroke(about 1 week ago) who has been admitted to the hospital  with altered mental status and slurred speech. She was  on blood thinners Xarelto and aspirin  Urine culture is pending. She is on ceftriaxone. She is on CBI for gross hematuria  CT scan  from 2/25/2022  IMPRESSION  Hemorrhage within the urinary bladder. Mild left  hydroureteronephrosis.   Problem List:  Patient Active Problem List   Diagnosis Code    HTN (hypertension) I10    Hyperlipidemia E78.5    Neuropathy G62.9    Sciatica M54.30    Diabetes mellitus with neurological manifestations, uncontrolled ([address] E11.49, E11.65    Infection of nailbed of toe of left foot L03.032    PAD (peripheral artery disease) ([facility]) I73.9    Hypercalcemia E83.52    DM type 2 causing vascular disease ([address] E11.59    Type 2 diabetes mellitus with nephropathy ([address] E11.21    Acute osteomyelitis of ankle or foot ([address] M86.179    Diabetic peripheral neuropathy ([address] E11.42    Spinal stenosis in cervical region M48.02    Ischemia of both lower extremities I99.8    Pain in both lower legs M79.661, M79.662    CVA (cerebral vascular accident) ([address] I63.9    Elevated troponin R77.8         Medications reviewed  Current Facility-Administered Medications   Medication Dose Route Frequency    gabapentin (NEURONTIN) capsule 100 mg  100 mg Oral DAILY    LORazepam (ATIVAN) 2 mg/mL injection        0.9% sodium chloride infusion  100 mL/hr IntraVENous CONTINUOUS    sodium bicarbonate (8.4%) 150 mEq in dextrose 5% 1,000 mL infusion   IntraVENous CONTINUOUS    HYDROcodone-acetaminophen (NORCO) 5-325 mg per tablet 1 Tablet  1 Tablet Oral Q4H PRN    cefTRIAXone (ROCEPHIN) 1 g in sterile water (preservative free) 10 mL IV syringe  1 g IntraVENous Q24H    dicyclomine (BENTYL) capsule 10 mg  10 mg Oral TID    lidocaine (XYLOCAINE) 4 % (40 mg/mL) topical solution   Topical PRN    [Held by provider] rivaroxaban (XARELTO) tablet 2.5 mg  2.5 mg Oral BID WITH MEALS    atorvastatin (LIPITOR) tablet 80 mg  80 mg Oral DAILY    labetaloL (NORMODYNE;TRANDATE) 20 mg/4 mL (5 mg/mL) injection 10 mg  10 mg IntraVENous Q6H PRN    sodium chloride (NS) flush 5-40 mL  5-40 mL IntraVENous Q8H    sodium chloride (NS) flush 5-40 mL  5-40 mL IntraVENous PRN    brimonidine (ALPHAGAN) 0.2 % ophthalmic solution 1 Drop  1 Drop Both Eyes TID    ferrous sulfate tablet 325 mg  325 mg Oral BID    insulin lispro (HUMALOG) injection   SubCUTAneous Q6H    glucose chewable tablet 16 g  4 Tablet Oral PRN    glucagon (GLUCAGEN) injection 1 mg  1 mg IntraMUSCular PRN    dextrose 10% infusion 125-250 mL  125-250 mL IntraVENous PRN    metoprolol succinate (TOPROL-XL) XL tablet 50 mg  50 mg Oral DAILY    amLODIPine (NORVASC) tablet 10 mg  10 mg Oral DAILY       Review of Systems:   Review of Systems   Unable to perform ROS: Mental status change           Objective:   Physical Exam  Constitutional:       Appearance: She is ill-appearing. HENT:      Head: Normocephalic. Cardiovascular:      Rate and Rhythm: Normal rate. Pulmonary:      Effort: Pulmonary effort is normal.   Abdominal:      Palpations: Abdomen is soft. Genitourinary:     Comments: Bell catheter with CBI running  clear light bloody urine, few small clots noted  Skin:     General: Skin is warm. Neurological:      Mental Status: She is disoriented.           Visit Vitals  /65   Pulse (!) 59   Temp 97.5 °F (36.4 °C)   Resp 17   Ht 5' 7\" (1.702 m)   Wt 180 lb (81.6 kg)   SpO2 100%   BMI 28.19 kg/m²         Data Review:       Recent Days:  Recent Labs     02/25/22 0224 02/25/22  0009   WBC 13.2* 14.1*   HGB 8.4* 7.5*   HCT 28.0* 25.5*   * 501* Recent Labs     02/25/22  1230 02/25/22  0224 02/25/22  0009    141 137   K 5.1 5.3* 5.6*   * 111* 109*   CO2 20* 13* 13*   * 155* 279*   BUN 64* 58* 54*   CREA 2.90* 2.71* 2.27*   CA 8.7 9.3 9.5   PHOS 5.3*  --   --    ALB 2.1*  --   --        24 Hour Results:  Recent Results (from the past 24 hour(s))   GLUCOSE, POC    Collection Time: 02/24/22  6:28 PM   Result Value Ref Range    Glucose (POC) 190 (H) 65 - 117 mg/dL    Performed by Chyna Isaac    GLUCOSE, POC    Collection Time: 02/24/22  9:47 PM   Result Value Ref Range    Glucose (POC) 353 (H) 65 - 117 mg/dL    Performed by VICKEY LEMONS    METABOLIC PANEL, BASIC    Collection Time: 02/25/22 12:09 AM   Result Value Ref Range    Sodium 137 136 - 145 mmol/L    Potassium 5.6 (H) 3.5 - 5.1 mmol/L    Chloride 109 (H) 97 - 108 mmol/L    CO2 13 (LL) 21 - 32 mmol/L    Anion gap 15 5 - 15 mmol/L    Glucose 279 (H) 65 - 100 mg/dL    BUN 54 (H) 6 - 20 mg/dL    Creatinine 2.27 (H) 0.55 - 1.02 mg/dL    BUN/Creatinine ratio 24 (H) 12 - 20      GFR est AA 25 (L) >60 ml/min/1.73m2    GFR est non-AA 21 (L) >60 ml/min/1.73m2    Calcium 9.5 8.5 - 10.1 mg/dL   CBC WITH AUTOMATED DIFF    Collection Time: 02/25/22 12:09 AM   Result Value Ref Range    WBC 14.1 (H) 3.6 - 11.0 K/uL    RBC 2.65 (L) 3.80 - 5.20 M/uL    HGB 7.5 (L) 11.5 - 16.0 g/dL    HCT 25.5 (L) 35.0 - 47.0 %    MCV 96.2 80.0 - 99.0 FL    MCH 28.3 26.0 - 34.0 PG    MCHC 29.4 (L) 30.0 - 36.5 g/dL    RDW 17.3 (H) 11.5 - 14.5 %    PLATELET 573 (H) 296 - 400 K/uL    MPV 10.4 8.9 - 12.9 FL    NRBC 0.0 0.0  WBC    ABSOLUTE NRBC 0.00 0.00 - 0.01 K/uL    NEUTROPHILS 83 (H) 32 - 75 %    LYMPHOCYTES 16 12 - 49 %    MONOCYTES 0 (L) 5 - 13 %    EOSINOPHILS 1 0 - 7 %    BASOPHILS 0 0 - 1 %    IMMATURE GRANULOCYTES 0 %    ABS. NEUTROPHILS 11.7 (H) 1.8 - 8.0 K/UL    ABS. LYMPHOCYTES 2.3 0.8 - 3.5 K/UL    ABS. MONOCYTES 0.0 0.0 - 1.0 K/UL    ABS. EOSINOPHILS 0.1 0.0 - 0.4 K/UL    ABS.  BASOPHILS 0.0 0.0 - 0.1 K/UL    ABS. IMM. GRANS. 0.0 K/UL    DF Manual      PLATELET COMMENTS Large Platelets      RBC COMMENTS Polychromasia  1+       GLUCOSE, POC    Collection Time: 02/25/22  1:23 AM   Result Value Ref Range    Glucose (POC) 219 (H) 65 - 117 mg/dL    Performed by Prashant Carter ([employer])    METABOLIC PANEL, BASIC    Collection Time: 02/25/22  2:24 AM   Result Value Ref Range    Sodium 141 136 - 145 mmol/L    Potassium 5.3 (H) 3.5 - 5.1 mmol/L    Chloride 111 (H) 97 - 108 mmol/L    CO2 13 (LL) 21 - 32 mmol/L    Anion gap 17 (H) 5 - 15 mmol/L    Glucose 155 (H) 65 - 100 mg/dL    BUN 58 (H) 6 - 20 mg/dL    Creatinine 2.71 (H) 0.55 - 1.02 mg/dL    BUN/Creatinine ratio 21 (H) 12 - 20      GFR est AA 21 (L) >60 ml/min/1.73m2    GFR est non-AA 17 (L) >60 ml/min/1.73m2    Calcium 9.3 8.5 - 10.1 mg/dL   CBC WITH AUTOMATED DIFF    Collection Time: 02/25/22  2:24 AM   Result Value Ref Range    WBC 13.2 (H) 3.6 - 11.0 K/uL    RBC 2.99 (L) 3.80 - 5.20 M/uL    HGB 8.4 (L) 11.5 - 16.0 g/dL    HCT 28.0 (L) 35.0 - 47.0 %    MCV 93.6 80.0 - 99.0 FL    MCH 28.1 26.0 - 34.0 PG    MCHC 30.0 30.0 - 36.5 g/dL    RDW 17.2 (H) 11.5 - 14.5 %    PLATELET 685 (H) 722 - 400 K/uL    MPV 10.4 8.9 - 12.9 FL    NRBC 0.3 (H) 0.0  WBC    ABSOLUTE NRBC 0.04 (H) 0.00 - 0.01 K/uL    NEUTROPHILS 90 (H) 32 - 75 %    LYMPHOCYTES 7 (L) 12 - 49 %    MONOCYTES 1 (L) 5 - 13 %    EOSINOPHILS 0 0 - 7 %    BASOPHILS 0 0 - 1 %    IMMATURE GRANULOCYTES 2 (H) 0 - 0.5 %    ABS. NEUTROPHILS 11.8 (H) 1.8 - 8.0 K/UL    ABS. LYMPHOCYTES 1.0 0.8 - 3.5 K/UL    ABS. MONOCYTES 0.1 0.0 - 1.0 K/UL    ABS. EOSINOPHILS 0.0 0.0 - 0.4 K/UL    ABS. BASOPHILS 0.0 0.0 - 0.1 K/UL    ABS. IMM.  GRANS. 0.2 (H) 0.00 - 0.04 K/UL    DF AUTOMATED     GLUCOSE, POC    Collection Time: 02/25/22  2:52 AM   Result Value Ref Range    Glucose (POC) 132 (H) 65 - 117 mg/dL    Performed by [facility], POC    Collection Time: 02/25/22  5:45 AM   Result Value Ref Range    Glucose (POC) 85 65 - 117 mg/dL    Performed by Alvin Grimm, POC    Collection Time: 02/25/22  9:43 AM   Result Value Ref Range    Glucose (POC) 109 65 - 117 mg/dL    Performed by Terry Murdock    EKG, 12 LEAD, SUBSEQUENT    Collection Time: 02/25/22 10:38 AM   Result Value Ref Range    Ventricular Rate 59 BPM    Atrial Rate 59 BPM    P-R Interval 154 ms    QRS Duration 100 ms    Q-T Interval 516 ms    QTC Calculation (Bezet) 510 ms    Calculated P Axis 48 degrees    Calculated R Axis 45 degrees    Calculated T Axis 40 degrees    Diagnosis       Sinus bradycardia  Prolonged QT  Abnormal ECG  When compared with ECG of 22-FEB-2022 17:53,  No significant change was found  Confirmed by SANKET Dickerson MD ([id number]) on 2/25/2022 10:49:34 AM     LACTIC ACID    Collection Time: 02/25/22 12:30 PM   Result Value Ref Range    Lactic acid 3.0 (HH) 0.4 - 2.0 mmol/L   CK    Collection Time: 02/25/22 12:30 PM   Result Value Ref Range     (H) 26 - 192 U/L   RENAL FUNCTION PANEL    Collection Time: 02/25/22 12:30 PM   Result Value Ref Range    Sodium 141 136 - 145 mmol/L    Potassium 5.1 3.5 - 5.1 mmol/L    Chloride 111 (H) 97 - 108 mmol/L    CO2 20 (L) 21 - 32 mmol/L    Anion gap 10 5 - 15 mmol/L    Glucose 130 (H) 65 - 100 mg/dL    BUN 64 (H) 6 - 20 mg/dL    Creatinine 2.90 (H) 0.55 - 1.02 mg/dL    BUN/Creatinine ratio 22 (H) 12 - 20      GFR est AA 19 (L) >60 ml/min/1.73m2    GFR est non-AA 16 (L) >60 ml/min/1.73m2    Calcium 8.7 8.5 - 10.1 mg/dL    Phosphorus 5.3 (H) 2.6 - 4.7 mg/dL    Albumin 2.1 (L) 3.5 - 5.0 g/dL   GLUCOSE, POC    Collection Time: 02/25/22 12:55 PM   Result Value Ref Range    Glucose (POC) 153 (H) 65 - 117 mg/dL    Performed by Bebeto Cunningham            Assessment/     Patient Active Problem List   Diagnosis Code    HTN (hypertension) I10    Hyperlipidemia E78.5    Neuropathy G62.9    Sciatica M54.30    Diabetes mellitus with neurological manifestations, uncontrolled ([facility]) E11.49, E11.65    Infection of nailbed of toe of left foot L03.032    PAD [facility]peripheral artery disease) ([facility]) I73.9    Hypercalcemia E83.52    DM type 2 causing vascular disease ([facility] E11.59    Type 2 diabetes mellitus with nephropathy ([facility] E11.21    Acute osteomyelitis of ankle or foot ([facility] M86.179    Diabetic peripheral neuropathy ([facility] E11.42    Spinal stenosis in cervical region M48.02    Ischemia of both lower extremities I99.8    Pain in both lower legs M79.661, M79.662    CVA (cerebral vascular accident) ([facility] I63.9    Elevated troponin R77.8       Plan:  1. Urinary tract infection  Urine culture pending She is on ceftriaxone. 2.Gross Hematuria  She is on CBI for gross hematuria  Patient still has clear light bloody urine, few small clots noted  Patient was on Xarelto, held due to hematuria    Plan for cystoscopy later, when patient is stable    CT of abdomen and pelvis from 2/25/2022  IMPRESSION  Hemorrhage within the urinary bladder. Mild left  Hydroureteronephrosis    3. RICARDO/CKD  Nephrology following  On Bicarbonate drip  4.  CVA          Care Plan discussed with: Dr. Mckay Salas, Attending Physician      Shahid Banuelos NP

## 2022-02-25 NOTE — PROGRESS NOTES
Consult Date: 2/25/2022  Subjective   Seen and examined in follow up  Clinical events reviewed from yesterday  RR was called,  Hematuria noted. .On bladder irrigation. Daughter by bedside - upset about new occurrences  She informs me that her mother had two seizures   She is also concerned if she has UTI and it is being treated. Expalined to her that she was being treated with ceftriaxone. I explained to her that blood in urine could be a side effect of blood thinners and could also be superimposed infection.     Review of Systems   Unable to perform ROS: Mental status change       Past Medical History:   Diagnosis Date    Diabetes mellitus ([address]     HTN (hypertension)     Hyperlipidemia     Neuropathy     PAD (peripheral artery disease) (HCC)     PCI    Sciatica       Past Surgical History:   Procedure Laterality Date    HX AMPUTATION Right     great toe    HX CERVICAL FUSION      HX OTHER SURGICAL Bilateral     Stent placement    HX OTHER SURGICAL      HX VASCULAR STENT Bilateral     2 in right 1 in left    HX VASCULAR STENT Bilateral      Family History   Problem Relation Age of Onset    Cancer Mother     Diabetes Mother     Hypertension Mother       Social History     Tobacco Use    Smoking status: Never Smoker    Smokeless tobacco: Never Used   Substance Use Topics    Alcohol use: Not Currently       Current Facility-Administered Medications   Medication Dose Route Frequency Provider Last Rate Last Admin    gabapentin (NEURONTIN) capsule 100 mg  100 mg Oral DAILY Lorraine JACKSON MD   100 mg at 02/25/22 0112    LORazepam (ATIVAN) 2 mg/mL injection             0.9% sodium chloride infusion  100 mL/hr IntraVENous CONTINUOUS Shahid Matthews  mL/hr at 02/25/22 0400 100 mL/hr at 02/25/22 0400    sodium bicarbonate (8.4%) 150 mEq in dextrose 5% 1,000 mL infusion   IntraVENous CONTINUOUS ChaseodaTravon teague MD        HYDROcodone-acetaminophen (NORCO) 5-325 mg per tablet 1 Tablet  1 Tablet Oral Q4H PRN Imani JACKSON MD   1 Tablet at 02/24/22 2303    cefTRIAXone (ROCEPHIN) 1 g in sterile water (preservative free) 10 mL IV syringe  1 g IntraVENous Q24H Imani JACKSON MD   1 g at 02/24/22 1718    dicyclomine (BENTYL) capsule 10 mg  10 mg Oral TID Imani JACKSON MD   10 mg at 02/24/22 2114    lidocaine (XYLOCAINE) 4 % (40 mg/mL) topical solution   Topical PRN Imani JACKSON MD   1 mL at 02/24/22 2136    [Held by provider] rivaroxaban (Dickens Ao) tablet 2.5 mg  2.5 mg Oral BID WITH MEALS Cabrera Broderick MD   2.5 mg at 02/24/22 1720    atorvastatin (LIPITOR) tablet 80 mg  80 mg Oral DAILY Cabrera Broderick MD   80 mg at 02/24/22 0910    labetaloL (NORMODYNE;TRANDATE) 20 mg/4 mL (5 mg/mL) injection 10 mg  10 mg IntraVENous Q6H PRN Cabrera Broderick MD        sodium chloride (NS) flush 5-40 mL  5-40 mL IntraVENous Q8H Imani JACKSON MD   10 mL at 02/25/22 0542    sodium chloride (NS) flush 5-40 mL  5-40 mL IntraVENous PRN Imani JACKSON MD        brimonidine (ALPHAGAN) 0.2 % ophthalmic solution 1 Drop  1 Drop Both Eyes TID Imani JACKSON MD   1 Drop at 02/24/22 2044    ferrous sulfate tablet 325 mg  325 mg Oral BID Imani JACKSON MD   325 mg at 02/24/22 2044    insulin lispro (HUMALOG) injection   SubCUTAneous Q6H Imani JACKSON MD   9 Units at 02/25/22 0045    glucose chewable tablet 16 g  4 Tablet Oral PRN Thiago Morales MD        glucagon (GLUCAGEN) injection 1 mg  1 mg IntraMUSCular PRN Imani JACKSON MD        dextrose 10% infusion 125-250 mL  125-250 mL IntraVENous PRN Imani JACKSON MD        metoprolol succinate (TOPROL-XL) XL tablet 50 mg  50 mg Oral DAILY Yolanda Rodarte MD   50 mg at 02/24/22 0911    amLODIPine (NORVASC) tablet 10 mg  10 mg Oral DAILY Yolanda Rodarte MD   10 mg at 02/24/22 0910          Objective     Vital signs for last 24 hours:  Visit Vitals  BP (!) 101/56   Pulse (!) 58   Temp 97.4 °F (36.3 °C)   Resp (!) 118   Ht 5' 7\" (1.702 m)   Wt 81.6 kg (180 lb)   SpO2 100%   BMI 28.19 kg/m²           Recent Results (from the past 24 hour(s))   GLUCOSE, POC    Collection Time: 02/24/22  6:28 PM   Result Value Ref Range    Glucose (POC) 190 (H) 65 - 117 mg/dL    Performed by Elan Kemp    GLUCOSE, POC    Collection Time: 02/24/22  9:47 PM   Result Value Ref Range    Glucose (POC) 353 (H) 65 - 117 mg/dL    Performed by VICKEY LEMONS    METABOLIC PANEL, BASIC    Collection Time: 02/25/22 12:09 AM   Result Value Ref Range    Sodium 137 136 - 145 mmol/L    Potassium 5.6 (H) 3.5 - 5.1 mmol/L    Chloride 109 (H) 97 - 108 mmol/L    CO2 13 (LL) 21 - 32 mmol/L    Anion gap 15 5 - 15 mmol/L    Glucose 279 (H) 65 - 100 mg/dL    BUN 54 (H) 6 - 20 mg/dL    Creatinine 2.27 (H) 0.55 - 1.02 mg/dL    BUN/Creatinine ratio 24 (H) 12 - 20      GFR est AA 25 (L) >60 ml/min/1.73m2    GFR est non-AA 21 (L) >60 ml/min/1.73m2    Calcium 9.5 8.5 - 10.1 mg/dL   CBC WITH AUTOMATED DIFF    Collection Time: 02/25/22 12:09 AM   Result Value Ref Range    WBC 14.1 (H) 3.6 - 11.0 K/uL    RBC 2.65 (L) 3.80 - 5.20 M/uL    HGB 7.5 (L) 11.5 - 16.0 g/dL    HCT 25.5 (L) 35.0 - 47.0 %    MCV 96.2 80.0 - 99.0 FL    MCH 28.3 26.0 - 34.0 PG    MCHC 29.4 (L) 30.0 - 36.5 g/dL    RDW 17.3 (H) 11.5 - 14.5 %    PLATELET 468 (H) 022 - 400 K/uL    MPV 10.4 8.9 - 12.9 FL    NRBC 0.0 0.0  WBC    ABSOLUTE NRBC 0.00 0.00 - 0.01 K/uL    NEUTROPHILS 83 (H) 32 - 75 %    LYMPHOCYTES 16 12 - 49 %    MONOCYTES 0 (L) 5 - 13 %    EOSINOPHILS 1 0 - 7 %    BASOPHILS 0 0 - 1 %    IMMATURE GRANULOCYTES 0 %    ABS. NEUTROPHILS 11.7 (H) 1.8 - 8.0 K/UL    ABS. LYMPHOCYTES 2.3 0.8 - 3.5 K/UL    ABS. MONOCYTES 0.0 0.0 - 1.0 K/UL    ABS. EOSINOPHILS 0.1 0.0 - 0.4 K/UL    ABS. BASOPHILS 0.0 0.0 - 0.1 K/UL    ABS. IMM.  GRANS. 0.0 K/UL    DF Manual      PLATELET COMMENTS Large Platelets      RBC COMMENTS Polychromasia  1+       GLUCOSE, POC    Collection Time: 02/25/22  1:23 AM   Result Value Ref Range Glucose (POC) 219 (H) 65 - 117 mg/dL    Performed by Prerna Prabhakar ([place])    METABOLIC PANEL, BASIC    Collection Time: 02/25/22  2:24 AM   Result Value Ref Range    Sodium 141 136 - 145 mmol/L    Potassium 5.3 (H) 3.5 - 5.1 mmol/L    Chloride 111 (H) 97 - 108 mmol/L    CO2 13 (LL) 21 - 32 mmol/L    Anion gap 17 (H) 5 - 15 mmol/L    Glucose 155 (H) 65 - 100 mg/dL    BUN 58 (H) 6 - 20 mg/dL    Creatinine 2.71 (H) 0.55 - 1.02 mg/dL    BUN/Creatinine ratio 21 (H) 12 - 20      GFR est AA 21 (L) >60 ml/min/1.73m2    GFR est non-AA 17 (L) >60 ml/min/1.73m2    Calcium 9.3 8.5 - 10.1 mg/dL   CBC WITH AUTOMATED DIFF    Collection Time: 02/25/22  2:24 AM   Result Value Ref Range    WBC 13.2 (H) 3.6 - 11.0 K/uL    RBC 2.99 (L) 3.80 - 5.20 M/uL    HGB 8.4 (L) 11.5 - 16.0 g/dL    HCT 28.0 (L) 35.0 - 47.0 %    MCV 93.6 80.0 - 99.0 FL    MCH 28.1 26.0 - 34.0 PG    MCHC 30.0 30.0 - 36.5 g/dL    RDW 17.2 (H) 11.5 - 14.5 %    PLATELET 442 (H) 112 - 400 K/uL    MPV 10.4 8.9 - 12.9 FL    NRBC 0.3 (H) 0.0  WBC    ABSOLUTE NRBC 0.04 (H) 0.00 - 0.01 K/uL    NEUTROPHILS 90 (H) 32 - 75 %    LYMPHOCYTES 7 (L) 12 - 49 %    MONOCYTES 1 (L) 5 - 13 %    EOSINOPHILS 0 0 - 7 %    BASOPHILS 0 0 - 1 %    IMMATURE GRANULOCYTES 2 (H) 0 - 0.5 %    ABS. NEUTROPHILS 11.8 (H) 1.8 - 8.0 K/UL    ABS. LYMPHOCYTES 1.0 0.8 - 3.5 K/UL    ABS. MONOCYTES 0.1 0.0 - 1.0 K/UL    ABS. EOSINOPHILS 0.0 0.0 - 0.4 K/UL    ABS. BASOPHILS 0.0 0.0 - 0.1 K/UL    ABS. IMM.  GRANS. 0.2 (H) 0.00 - 0.04 K/UL    DF AUTOMATED     GLUCOSE, POC    Collection Time: 02/25/22  2:52 AM   Result Value Ref Range    Glucose (POC) 132 (H) 65 - 117 mg/dL    Performed by [facility], POC    Collection Time: 02/25/22  5:45 AM   Result Value Ref Range    Glucose (POC) 85 65 - 117 mg/dL    Performed by [facility], POC    Collection Time: 02/25/22  9:43 AM   Result Value Ref Range    Glucose (POC) 109 65 - 117 mg/dL    Performed by [employer] EKG, 12 LEAD, SUBSEQUENT    Collection Time: 02/25/22 10:38 AM   Result Value Ref Range    Ventricular Rate 59 BPM    Atrial Rate 59 BPM    P-R Interval 154 ms    QRS Duration 100 ms    Q-T Interval 516 ms    QTC Calculation (Bezet) 510 ms    Calculated P Axis 48 degrees    Calculated R Axis 45 degrees    Calculated T Axis 40 degrees    Diagnosis       Sinus bradycardia  Prolonged QT  Abnormal ECG  When compared with ECG of 22-FEB-2022 17:53,  No significant change was found  Confirmed by Karime Haynes MD, Larry López ([id number]) on 2/25/2022 10:49:34 AM            Intake/Output Summary (Last 24 hours) at 2/25/2022 1133  Last data filed at 2/25/2022 0600  Gross per 24 hour   Intake 18589 ml   Output 33471 ml   Net 3616 ml      Current Shift: No intake/output data recorded. Last 3 Shifts: 02/23 1901 - 02/25 0700  In: [id number]   Out: [address] [Urine:[id number]]  Physical Exam  Vitals reviewed. Constitutional:       Appearance: She is ill-appearing. Cardiovascular:      Rate and Rhythm: Normal rate. Pulmonary:      Effort: Pulmonary effort is normal. No respiratory distress. Musculoskeletal:         General: No swelling or tenderness. Skin:     General: Skin is warm and dry. Bell in place CBI and blood in urine lighter in color.   Data Review:   Recent Results (from the past 24 hour(s))   GLUCOSE, POC    Collection Time: 02/24/22  6:28 PM   Result Value Ref Range    Glucose (POC) 190 (H) 65 - 117 mg/dL    Performed by Sissy Akers    GLUCOSE, POC    Collection Time: 02/24/22  9:47 PM   Result Value Ref Range    Glucose (POC) 353 (H) 65 - 117 mg/dL    Performed by [address], BASIC    Collection Time: 02/25/22 12:09 AM   Result Value Ref Range    Sodium 137 136 - 145 mmol/L    Potassium 5.6 (H) 3.5 - 5.1 mmol/L    Chloride 109 (H) 97 - 108 mmol/L    CO2 13 (LL) 21 - 32 mmol/L    Anion gap 15 5 - 15 mmol/L    Glucose 279 (H) 65 - 100 mg/dL    BUN 54 (H) 6 - 20 mg/dL    Creatinine 2.27 (H) 0.55 - 1.02 mg/dL    BUN/Creatinine ratio 24 (H) 12 - 20      GFR est AA 25 (L) >60 ml/min/1.73m2    GFR est non-AA 21 (L) >60 ml/min/1.73m2    Calcium 9.5 8.5 - 10.1 mg/dL   CBC WITH AUTOMATED DIFF    Collection Time: 02/25/22 12:09 AM   Result Value Ref Range    WBC 14.1 (H) 3.6 - 11.0 K/uL    RBC 2.65 (L) 3.80 - 5.20 M/uL    HGB 7.5 (L) 11.5 - 16.0 g/dL    HCT 25.5 (L) 35.0 - 47.0 %    MCV 96.2 80.0 - 99.0 FL    MCH 28.3 26.0 - 34.0 PG    MCHC 29.4 (L) 30.0 - 36.5 g/dL    RDW 17.3 (H) 11.5 - 14.5 %    PLATELET 689 (H) 955 - 400 K/uL    MPV 10.4 8.9 - 12.9 FL    NRBC 0.0 0.0  WBC    ABSOLUTE NRBC 0.00 0.00 - 0.01 K/uL    NEUTROPHILS 83 (H) 32 - 75 %    LYMPHOCYTES 16 12 - 49 %    MONOCYTES 0 (L) 5 - 13 %    EOSINOPHILS 1 0 - 7 %    BASOPHILS 0 0 - 1 %    IMMATURE GRANULOCYTES 0 %    ABS. NEUTROPHILS 11.7 (H) 1.8 - 8.0 K/UL    ABS. LYMPHOCYTES 2.3 0.8 - 3.5 K/UL    ABS. MONOCYTES 0.0 0.0 - 1.0 K/UL    ABS. EOSINOPHILS 0.1 0.0 - 0.4 K/UL    ABS. BASOPHILS 0.0 0.0 - 0.1 K/UL    ABS. IMM.  GRANS. 0.0 K/UL    DF Manual      PLATELET COMMENTS Large Platelets      RBC COMMENTS Polychromasia  1+       GLUCOSE, POC    Collection Time: 02/25/22  1:23 AM   Result Value Ref Range    Glucose (POC) 219 (H) 65 - 117 mg/dL    Performed by Sarina Ruvalcaba ([employer])    METABOLIC PANEL, BASIC    Collection Time: 02/25/22  2:24 AM   Result Value Ref Range    Sodium 141 136 - 145 mmol/L    Potassium 5.3 (H) 3.5 - 5.1 mmol/L    Chloride 111 (H) 97 - 108 mmol/L    CO2 13 (LL) 21 - 32 mmol/L    Anion gap 17 (H) 5 - 15 mmol/L    Glucose 155 (H) 65 - 100 mg/dL    BUN 58 (H) 6 - 20 mg/dL    Creatinine 2.71 (H) 0.55 - 1.02 mg/dL    BUN/Creatinine ratio 21 (H) 12 - 20      GFR est AA 21 (L) >60 ml/min/1.73m2    GFR est non-AA 17 (L) >60 ml/min/1.73m2    Calcium 9.3 8.5 - 10.1 mg/dL   CBC WITH AUTOMATED DIFF    Collection Time: 02/25/22  2:24 AM   Result Value Ref Range    WBC 13.2 (H) 3.6 - 11.0 K/uL    RBC 2.99 (L) 3.80 - 5.20 M/uL    HGB 8.4 (L) 11.5 - 16.0 g/dL    HCT 28.0 (L) 35.0 - 47.0 %    MCV 93.6 80.0 - 99.0 FL    MCH 28.1 26.0 - 34.0 PG    MCHC 30.0 30.0 - 36.5 g/dL    RDW 17.2 (H) 11.5 - 14.5 %    PLATELET 457 (H) 588 - 400 K/uL    MPV 10.4 8.9 - 12.9 FL    NRBC 0.3 (H) 0.0  WBC    ABSOLUTE NRBC 0.04 (H) 0.00 - 0.01 K/uL    NEUTROPHILS 90 (H) 32 - 75 %    LYMPHOCYTES 7 (L) 12 - 49 %    MONOCYTES 1 (L) 5 - 13 %    EOSINOPHILS 0 0 - 7 %    BASOPHILS 0 0 - 1 %    IMMATURE GRANULOCYTES 2 (H) 0 - 0.5 %    ABS. NEUTROPHILS 11.8 (H) 1.8 - 8.0 K/UL    ABS. LYMPHOCYTES 1.0 0.8 - 3.5 K/UL    ABS. MONOCYTES 0.1 0.0 - 1.0 K/UL    ABS. EOSINOPHILS 0.0 0.0 - 0.4 K/UL    ABS. BASOPHILS 0.0 0.0 - 0.1 K/UL    ABS. IMM. GRANS. 0.2 (H) 0.00 - 0.04 K/UL    DF AUTOMATED     GLUCOSE, POC    Collection Time: 02/25/22  2:52 AM   Result Value Ref Range    Glucose (POC) 132 (H) 65 - 117 mg/dL    Performed by [facility], POC    Collection Time: 02/25/22  5:45 AM   Result Value Ref Range    Glucose (POC) 85 65 - 117 mg/dL    Performed by [facility], POC    Collection Time: 02/25/22  9:43 AM   Result Value Ref Range    Glucose (POC) 109 65 - 117 mg/dL    Performed by Terry Murdock    EKG, 12 LEAD, SUBSEQUENT    Collection Time: 02/25/22 10:38 AM   Result Value Ref Range    Ventricular Rate 59 BPM    Atrial Rate 59 BPM    P-R Interval 154 ms    QRS Duration 100 ms    Q-T Interval 516 ms    QTC Calculation (Bezet) 510 ms    Calculated P Axis 48 degrees    Calculated R Axis 45 degrees    Calculated T Axis 40 degrees    Diagnosis       Sinus bradycardia  Prolonged QT  Abnormal ECG  When compared with ECG of 22-FEB-2022 17:53,  No significant change was found  Confirmed by Belle Santamaria MD, Katie Dukes ([id number]) on 2/25/2022 10:49:34 AM           CT HEAD WO CONT   Final Result   Negative for acute intracranial process. CT ABD PELV WO CONT   Final Result   Hemorrhage within the urinary bladder. Mild left   hydroureteronephrosis.       XR SWALLOW [facility] VIDEO   Final Result   Penetration with thin consistency. No aspiration. CTA HEAD NECK W WO CONT   Final Result   Negative head and neck CTA. Please note that all carotid bifurcation stenoses are measured as per NASCET   criteria. CT CODE NEURO HEAD WO CONTRAST   Final Result   1. No acute large vessel intracranial ischemia, hemorrhage. Called report to   patient's nurse Thuy Turner at 7:12 PM 2/22/2022.   2. Right basal ganglia lacunar infarct. 3. Periventricular microangiopathy. 4. Pansinusitis. See above. MRI BRAIN WO CONT   Final Result   Acute infarction left paracentral lobule and region of the white   matter of the left motor cortex. CT CODE NEURO HEAD WO CONTRAST   Final Result      1. No acute intracranial hemorrhage. 2. Left basal ganglia lacunar infarct, which is age indeterminate in the absence   of any prior outside studies, but favored to be chronic. 3. Moderate to severe paranasal sinus disease. Results called to Dr. Zenia Meredith at 9:39 PM on 2/21/2022           Patient Active Problem List   Diagnosis Code    HTN (hypertension) I10    Hyperlipidemia E78.5    Neuropathy G62.9    Sciatica M54.30    Diabetes mellitus with neurological manifestations, uncontrolled ([address] E11.49, E11.65    Infection of nailbed of toe of left foot L03.032    [facility]AD [facility]peripheral artery [facility]) ([facility]) I73.9    Hypercalcemia E83.52    DM type 2 causing vascular disease ([address] E11.59    Type 2 diabetes mellitus with nephropathy ([address] E11.21    Acute osteomyelitis of ankle or foot ([address] M86.179    Diabetic peripheral neuropathy ([address] E11.42    Spinal stenosis in cervical region M48.02    Ischemia of both lower extremities I99.8    Pain in both lower legs M79.661, M79.662    CVA (cerebral vascular accident) ([address] I63.9    Elevated troponin R77.8        DIAGNOSES:  1. RICARDO ON CKD. Chantal Bloodgood grade 2  2. CKD stage 3B  3. AG metabolic acidosis   4.  Urinary Tract Infection Complicated 5. Hemorrhagic UTI  6. Diabetes Mellitus II with complications  7. CVA( R cerebellar peduncle stroke)    DISCUSSION:   Lactic acid ordered; CPK ordered. Both levels could be high with seizure    Dr Ryder Reason urology in to see patient   Explained to hannah that her kidney function was worse and I antcipate further worsening before improvement   Appreciate neurology involvement   D/w Dr Nico Alfonso and ICU RN      PLAN:  Bicarbonate drip ordered  Labs at  2 pm and 8 pm again today. Appreciate Urology involvement  On ceftriaxone - await identification of organism        Thanks for consulting me. Please don't hesitate to contact me if any questions arise of if I can assist in any manner. This dictation was done by dragon, computer voice recognition software. Often unanticipated grammatical, syntax, phones and other interpretive errors are inadvertently transcribed. Please excuse errors that have escaped final proofreading. Please contact me if you suspect dictation or transcription errors.   Dr Sally Rutherford  [address] [place] [address], [address], [address] [facility]  Cell Phone: [phone]  Office phone: [phone]  Fax: [phone]

## 2022-02-25 NOTE — PROGRESS NOTES
Patient with change in status and transferred to ICU following rapid response. ST will require new orders to follow. Please place new consult as medically appropriate.

## 2022-02-25 NOTE — PROGRESS NOTES
New consult received following transfer to ICU. Attempted bedside swallow re-evaluation. Daughter and RN at bedside. Patient w/ eyes open, non verbal, poor engagement,  inconsistently follows basic commands w/ min cues. Oral care provided w/ absent labial closure around swab. Patient not appropriate for PO intake at this time. Extensive education provided to daughter. Daughter receptive. Discussed w/ MD and RN. Plan for NGT for short term nutrition. ST will cont to follow and re-eval once medically appropriate.

## 2022-02-25 NOTE — PROGRESS NOTES
Patient currently on OT caseload, however patient transferred to ICU from [address] [place] [address] due to medical decline. OT to hold at this time. Will need new OT evaluation order once pt medically stable for re-assessment. Thank you.

## 2022-02-25 NOTE — PROGRESS NOTES
Progress Note    Patient: Manuela Akins MRN: [id number]  SSN: [id number]    YOB: 1945  Age: 78 y.o. Sex: female      Admit Date: 2/21/2022    LOS: 0 days     Subjective:     78years old patient who had initial stroke and treated at [facility] patient was readmitted Hospital with an MRI showing left stroke. Patient has acute kidney failure with moderate to severe metabolic acidosis, with hematuria. Rapid response was called for patient was transferred to the ICU because of possible seizure. Patient was started on IV Ativan, CT of the head shows no hemorrhage CT of the abdomen and pelvis shows hemorrhagic bladder hydronephrosis. Patient is drowsy daughter is by the bedside    Objective:     Vitals:    02/25/22 0600 02/25/22 0700 02/25/22 0803 02/25/22 0900   BP: 118/65 109/74  130/63   Pulse: 66 63  64   Resp: 23 20  20   Temp:  97.4 °F (36.3 °C)     SpO2: 100% 100% 100% 100%   Weight:       Height:            Intake and Output:  Current Shift: No intake/output data recorded. Last three shifts: 02/23 1901 - 02/25 0700  In: 27560   Out: 66189 [Urine:[id number]]    Physical Exam:   General:  drowsy   Eyes:     Ears:  Normal TMs and external ear canals both ears. Nose: Nares normal. Septum midline. Mucosa normal. No drainage or sinus tenderness. Mouth/Throat: Lips, mucosa, and tongue normal. Teeth and gums normal.   Neck: Supple, symmetrical, trachea midline, no adenopathy, thyroid: no enlargment/tenderness/nodules, no carotid bruit and no JVD. Back:   Symmetric, no curvature. ROM normal. No CVA tenderness. Lungs:   Clear to auscultation bilaterally. Heart:  Regular rate and rhythm, S1, S2 normal, no murmur, click, rub or gallop. Abdomen:   Soft, non-tender. Bowel sounds normal. No masses,  No organomegaly. Extremities: Extremities normal, atraumatic, no cyanosis or edema.    Pulses: drowsy   Skin: Skin color, texture, turgor normal. No rashes or lesions   Lymph nodes: drowsy   Neurologic:        Lab/Data Review: All lab results for the last 24 hours reviewed. Recent Results (from the past 24 hour(s))   GLUCOSE, POC    Collection Time: 02/24/22 10:53 AM   Result Value Ref Range    Glucose (POC) 324 (H) 65 - 117 mg/dL    Performed by Sissy Akers    GLUCOSE, POC    Collection Time: 02/24/22  6:28 PM   Result Value Ref Range    Glucose (POC) 190 (H) 65 - 117 mg/dL    Performed by Sissy Akers    GLUCOSE, POC    Collection Time: 02/24/22  9:47 PM   Result Value Ref Range    Glucose (POC) 353 (H) 65 - 117 mg/dL    Performed by VICKEY LEMONS    METABOLIC PANEL, BASIC    Collection Time: 02/25/22 12:09 AM   Result Value Ref Range    Sodium 137 136 - 145 mmol/L    Potassium 5.6 (H) 3.5 - 5.1 mmol/L    Chloride 109 (H) 97 - 108 mmol/L    CO2 13 (LL) 21 - 32 mmol/L    Anion gap 15 5 - 15 mmol/L    Glucose 279 (H) 65 - 100 mg/dL    BUN 54 (H) 6 - 20 mg/dL    Creatinine 2.27 (H) 0.55 - 1.02 mg/dL    BUN/Creatinine ratio 24 (H) 12 - 20      GFR est AA 25 (L) >60 ml/min/1.73m2    GFR est non-AA 21 (L) >60 ml/min/1.73m2    Calcium 9.5 8.5 - 10.1 mg/dL   CBC WITH AUTOMATED DIFF    Collection Time: 02/25/22 12:09 AM   Result Value Ref Range    WBC 14.1 (H) 3.6 - 11.0 K/uL    RBC 2.65 (L) 3.80 - 5.20 M/uL    HGB 7.5 (L) 11.5 - 16.0 g/dL    HCT 25.5 (L) 35.0 - 47.0 %    MCV 96.2 80.0 - 99.0 FL    MCH 28.3 26.0 - 34.0 PG    MCHC 29.4 (L) 30.0 - 36.5 g/dL    RDW 17.3 (H) 11.5 - 14.5 %    PLATELET 822 (H) 850 - 400 K/uL    MPV 10.4 8.9 - 12.9 FL    NRBC 0.0 0.0  WBC    ABSOLUTE NRBC 0.00 0.00 - 0.01 K/uL    NEUTROPHILS 83 (H) 32 - 75 %    LYMPHOCYTES 16 12 - 49 %    MONOCYTES 0 (L) 5 - 13 %    EOSINOPHILS 1 0 - 7 %    BASOPHILS 0 0 - 1 %    IMMATURE GRANULOCYTES 0 %    ABS. NEUTROPHILS 11.7 (H) 1.8 - 8.0 K/UL    ABS. LYMPHOCYTES 2.3 0.8 - 3.5 K/UL    ABS. MONOCYTES 0.0 0.0 - 1.0 K/UL    ABS. EOSINOPHILS 0.1 0.0 - 0.4 K/UL    ABS. BASOPHILS 0.0 0.0 - 0.1 K/UL    ABS. IMM.  GRANS. 0.0 K/UL DF Manual      PLATELET COMMENTS Large Platelets      RBC COMMENTS Polychromasia  1+       GLUCOSE, POC    Collection Time: 02/25/22  1:23 AM   Result Value Ref Range    Glucose (POC) 219 (H) 65 - 117 mg/dL    Performed by Destinee Moise (Trvlr)    METABOLIC PANEL, BASIC    Collection Time: 02/25/22  2:24 AM   Result Value Ref Range    Sodium 141 136 - 145 mmol/L    Potassium 5.3 (H) 3.5 - 5.1 mmol/L    Chloride 111 (H) 97 - 108 mmol/L    CO2 13 (LL) 21 - 32 mmol/L    Anion gap 17 (H) 5 - 15 mmol/L    Glucose 155 (H) 65 - 100 mg/dL    BUN 58 (H) 6 - 20 mg/dL    Creatinine 2.71 (H) 0.55 - 1.02 mg/dL    BUN/Creatinine ratio 21 (H) 12 - 20      GFR est AA 21 (L) >60 ml/min/1.73m2    GFR est non-AA 17 (L) >60 ml/min/1.73m2    Calcium 9.3 8.5 - 10.1 mg/dL   CBC WITH AUTOMATED DIFF    Collection Time: 02/25/22  2:24 AM   Result Value Ref Range    WBC 13.2 (H) 3.6 - 11.0 K/uL    RBC 2.99 (L) 3.80 - 5.20 M/uL    HGB 8.4 (L) 11.5 - 16.0 g/dL    HCT 28.0 (L) 35.0 - 47.0 %    MCV 93.6 80.0 - 99.0 FL    MCH 28.1 26.0 - 34.0 PG    MCHC 30.0 30.0 - 36.5 g/dL    RDW 17.2 (H) 11.5 - 14.5 %    PLATELET 579 (H) 264 - 400 K/uL    MPV 10.4 8.9 - 12.9 FL    NRBC 0.3 (H) 0.0  WBC    ABSOLUTE NRBC 0.04 (H) 0.00 - 0.01 K/uL    NEUTROPHILS 90 (H) 32 - 75 %    LYMPHOCYTES 7 (L) 12 - 49 %    MONOCYTES 1 (L) 5 - 13 %    EOSINOPHILS 0 0 - 7 %    BASOPHILS 0 0 - 1 %    IMMATURE GRANULOCYTES 2 (H) 0 - 0.5 %    ABS. NEUTROPHILS 11.8 (H) 1.8 - 8.0 K/UL    ABS. LYMPHOCYTES 1.0 0.8 - 3.5 K/UL    ABS. MONOCYTES 0.1 0.0 - 1.0 K/UL    ABS. EOSINOPHILS 0.0 0.0 - 0.4 K/UL    ABS. BASOPHILS 0.0 0.0 - 0.1 K/UL    ABS. IMM.  GRANS. 0.2 (H) 0.00 - 0.04 K/UL    DF AUTOMATED     GLUCOSE, POC    Collection Time: 02/25/22  2:52 AM   Result Value Ref Range    Glucose (POC) 132 (H) 65 - 117 mg/dL    Performed by Ap Kapoor, POC    Collection Time: 02/25/22  5:45 AM   Result Value Ref Range    Glucose (POC) 85 65 - 117 mg/dL    Performed by Tessa Ca          Assessment:     Active Problems:    CVA (cerebral vascular accident) ([address] (2/21/2022)      Elevated troponin (2/21/2022)    Recurrent acute CVA  Possible non-STEMI  Acute kidney injury  Hemorrhagic bladder  UTI gram-negative  Metabolic encephalopathy possibly related to acidosis and Ativan      Plan:   I discussed with the neurologist was of the opinion that patient  did not have a seizure like to monitor patient closely in the ICU for any mental status change. I discussed with the nephrologist as regards acute kidney injury and metabolic acidosis plan is to obtain a lactic acid and start patient on IV bicarbonate. Consultation with neurology was obtained and is pending evaluation. Place patient on DVT prophylaxis with SCD. I discussed with the daughter extensively and family members on the phone regarding diagnosis and treatment plans. Daughter is concerned about the pain with patient. I explained the risk and benefits of pain control with the patient with acute renal failure which may cause more harm than good. Also explained to her that the risks and benefits of pain control constantly considered in the management of the patient. Patient is currently on norco5/325 1 tablet every 4. She is also on dicyclomine for colicky abdominal pain and also on Neurontin with an adjusted renal dose.   Patient is also on lidocaine cream  Critical care time of 70 minutes      Signed By: Jacquelyn Ferguson MD     February 25, 2022

## 2022-02-25 NOTE — PROGRESS NOTES
CM reviewed clinical chart. Patient's discharge plan is to return home with home health services provided by [address] [place] [address]. Referral sent via [facility]. CM will continue to follow.

## 2022-02-25 NOTE — PROGRESS NOTES
Spoke with Nurse practitioner Hayley at bedside this afternoon regarding giraldo leaking and difficulty with irrigation. Giraldo changed to 24F 3 way for better irrigation and less leaking. Clots noted when previous catheter taken out.

## 2022-02-25 NOTE — PROGRESS NOTES
Call placed to Dr. Claudell Ditty regarding lab work. Orders received to transfuse 1 unit PRBCs and have 2 units on standby. MD would like to recheck a CBC and renal panel in the am. Pt's daughter at bedside, signed consent for blood transfusion. Type and screen sent. Bedside report given to Kayli Lozada RN.

## 2022-02-25 NOTE — PROGRESS NOTES
Medicare Outpatient Observation Notice (MOON)  provided to patient/representative with verbal explanation of the notice. Time allotted for questions regarding the notice. Patient /representative provided a completed copy of the MOON/VOON notice. Copy placed on bedside chart. Patient's daughter Antoni Richardson refused to sign; patient too drowsy to sign.

## 2022-02-25 NOTE — PROGRESS NOTES
Critical Result Notification    Received and verbally repeated the following test results CO2, 13 from  on 2/25/2022 , at [facility].     Tj Mcgill MD via face to face on the unit.       Margarita Wilson RN

## 2022-02-26 NOTE — PROGRESS NOTES
Consult Date: 2/26/2022  Subjective   Seen and examined in follow up  Clinical events reviewed from overnight. Overnight labs reviewed. Still on continuous bladder irrigation. Urine looks pink in color now. Patient was responding to her name before as per RN. Accompanied by granddaughter today.   Nonverbal to me    Review of Systems   Unable to perform ROS: Mental status change       Past Medical History:   Diagnosis Date    Diabetes mellitus ([address]     HTN (hypertension)     Hyperlipidemia     Neuropathy     PAD (peripheral artery disease) (HCC)     PCI    Sciatica       Past Surgical History:   Procedure Laterality Date    HX AMPUTATION Right     great toe    HX CERVICAL FUSION      HX OTHER SURGICAL Bilateral     Stent placement    HX OTHER SURGICAL      HX VASCULAR STENT Bilateral     2 in right 1 in left    HX VASCULAR STENT Bilateral      Family History   Problem Relation Age of Onset    Cancer Mother     Diabetes Mother     Hypertension Mother       Social History     Tobacco Use    Smoking status: Never Smoker    Smokeless tobacco: Never Used   Substance Use Topics    Alcohol use: Not Currently       Current Facility-Administered Medications   Medication Dose Route Frequency Provider Last Rate Last Admin    gabapentin (NEURONTIN) capsule 100 mg  100 mg Oral DAILY Bam JACKSON MD   100 mg at 02/25/22 0112    0.9% sodium chloride infusion  50 mL/hr IntraVENous CONTINUOUS Erma Ramírez MD 50 mL/hr at 02/26/22 0950 50 mL/hr at 02/26/22 0950    0.9% sodium chloride infusion 250 mL  250 mL IntraVENous PRN Ella Louise MD        zinc oxide-white petrolatum 20-75 % topical paste   Topical BID Stefanie Smith MD   Given at 02/26/22 0900    cefTRIAXone (ROCEPHIN) 1 g in sterile water (preservative free) 10 mL IV syringe  1 g IntraVENous Q24H Bam JACKSON MD   1 g at 02/25/22 1815    dicyclomine (BENTYL) capsule 10 mg  10 mg Oral TID Bam JACKSON MD   10 mg at 02/26/22 0901    lidocaine (XYLOCAINE) 4 % (40 mg/mL) topical solution   Topical PRN Franci JACKSON MD   Given at 02/26/22 1059    [Held by provider] rivaroxaban (XARELTO) tablet 2.5 mg  2.5 mg Oral BID WITH MEALS Ap Mccarthy MD   2.5 mg at 02/24/22 1720    atorvastatin (LIPITOR) tablet 80 mg  80 mg Oral DAILY Ap Mccarthy MD   80 mg at 02/26/22 0900    labetaloL (NORMODYNE;TRANDATE) 20 mg/4 mL (5 mg/mL) injection 10 mg  10 mg IntraVENous Q6H PRN Ap Mccarthy MD        sodium chloride (NS) flush 5-40 mL  5-40 mL IntraVENous Q8H Franci JACKSON MD   10 mL at 02/26/22 0515    sodium chloride (NS) flush 5-40 mL  5-40 mL IntraVENous PRN Jonatan Suero MD        brimonidine (ALPHAGAN) 0.2 % ophthalmic solution 1 Drop  1 Drop Both Eyes TID Jonatan Suero MD   1 Drop at 02/26/22 0901    ferrous sulfate tablet 325 mg  325 mg Oral BID Franci JACKSON MD   325 mg at 02/26/22 0901    insulin lispro (HUMALOG) injection   SubCUTAneous Q6H Franci JACKSON MD   6 Units at 02/26/22 0557    glucose chewable tablet 16 g  4 Tablet Oral PRN Jonatan Suero MD        glucagon (GLUCAGEN) injection 1 mg  1 mg IntraMUSCular PRN Franci JACKSON MD        dextrose 10% infusion 125-250 mL  125-250 mL IntraVENous PRN Jonatan Suero MD        metoprolol succinate (TOPROL-XL) XL tablet 50 mg  50 mg Oral DAILY Rambo Boogie MD   50 mg at 02/24/22 0911    amLODIPine (NORVASC) tablet 10 mg  10 mg Oral DAILY Rambo Boogie MD   10 mg at 02/24/22 2465          Objective     Vital signs for last 24 hours:  Visit Vitals  BP (!) 129/59   Pulse 66   Temp 97.7 °F (36.5 °C)   Resp 15   Ht 5' 7\" (1.702 m)   Wt 81.5 kg (179 lb 10.8 oz) Comment: ICU bed, 1 pillow, bottom sheet, top sheet, 1 towel only   SpO2 100%   BMI 28.14 kg/m²           Recent Results (from the past 24 hour(s))   GLUCOSE, POC    Collection Time: 02/25/22 12:55 PM   Result Value Ref Range    Glucose (POC) 153 (H) 65 - 117 mg/dL Performed by Murali Rosas    RENAL FUNCTION PANEL    Collection Time: 02/25/22  5:28 PM   Result Value Ref Range    Sodium 142 136 - 145 mmol/L    Potassium 5.6 (H) 3.5 - 5.1 mmol/L    Chloride 109 (H) 97 - 108 mmol/L    CO2 21 21 - 32 mmol/L    Anion gap 12 5 - 15 mmol/L    Glucose 247 (H) 65 - 100 mg/dL    BUN 68 (H) 6 - 20 mg/dL    Creatinine 3.18 (H) 0.55 - 1.02 mg/dL    BUN/Creatinine ratio 21 (H) 12 - 20      GFR est AA 17 (L) >60 ml/min/1.73m2    GFR est non-AA 14 (L) >60 ml/min/1.73m2    Calcium 8.2 (L) 8.5 - 10.1 mg/dL    Phosphorus 6.1 (H) 2.6 - 4.7 mg/dL    Albumin 2.0 (L) 3.5 - 5.0 g/dL   CBC WITH AUTOMATED DIFF    Collection Time: 02/25/22  5:28 PM   Result Value Ref Range    WBC 24.8 (H) 3.6 - 11.0 K/uL    RBC 2.38 (L) 3.80 - 5.20 M/uL    HGB 6.9 (L) 11.5 - 16.0 g/dL    HCT 21.1 (L) 35.0 - 47.0 %    MCV 88.7 80.0 - 99.0 FL    MCH 29.0 26.0 - 34.0 PG    MCHC 32.7 30.0 - 36.5 g/dL    RDW 17.2 (H) 11.5 - 14.5 %    PLATELET 726 211 - 655 K/uL    MPV 10.8 8.9 - 12.9 FL    NRBC 0.1 (H) 0.0  WBC    ABSOLUTE NRBC 0.03 (H) 0.00 - 0.01 K/uL    NEUTROPHILS 87 (H) 32 - 75 %    BAND NEUTROPHILS 6 0 - 6 %    LYMPHOCYTES 6 (L) 12 - 49 %    MONOCYTES 1 (L) 5 - 13 %    EOSINOPHILS 0 0 - 7 %    BASOPHILS 0 0 - 1 %    IMMATURE GRANULOCYTES 0 %    ABS. NEUTROPHILS 23.1 (H) 1.8 - 8.0 K/UL    ABS. LYMPHOCYTES 1.5 0.8 - 3.5 K/UL    ABS. MONOCYTES 0.2 0.0 - 1.0 K/UL    ABS. EOSINOPHILS 0.0 0.0 - 0.4 K/UL    ABS. BASOPHILS 0.0 0.0 - 0.1 K/UL    ABS. IMM.  GRANS. 0.0 K/UL    DF Manual      RBC COMMENTS Hypochromia  1+        RBC COMMENTS Microcytosis  1+       GLUCOSE, POC    Collection Time: 02/25/22  5:33 PM   Result Value Ref Range    Glucose (POC) 260 (H) 65 - 117 mg/dL    Performed by [address] [place] [address]    Collection Time: 02/25/22  7:00 PM   Result Value Ref Range    Crossmatch Expiration 02/28/2022,2359     ABO/Rh(D) O Positive     Antibody screen Negative     Unit number [id number]     Blood component type RC LR,2     Unit division 00     Status of unit [address] to transfuse     Crossmatch result Compatible    GLUCOSE, POC    Collection Time: 02/25/22 11:37 PM   Result Value Ref Range    Glucose (POC) 314 (H) 65 - 117 mg/dL    Performed by Joo Henderson    CBC WITH AUTOMATED DIFF    Collection Time: 02/26/22  4:00 AM   Result Value Ref Range    WBC 22.3 (H) 3.6 - 11.0 K/uL    RBC 2.58 (L) 3.80 - 5.20 M/uL    HGB 7.5 (L) 11.5 - 16.0 g/dL    HCT 22.8 (L) 35.0 - 47.0 %    MCV 88.4 80.0 - 99.0 FL    MCH 29.1 26.0 - 34.0 PG    MCHC 32.9 30.0 - 36.5 g/dL    RDW 16.2 (H) 11.5 - 14.5 %    PLATELET 804 768 - 830 K/uL    MPV 10.9 8.9 - 12.9 FL    NRBC 0.3 (H) 0.0  WBC    ABSOLUTE NRBC 0.07 (H) 0.00 - 0.01 K/uL    NEUTROPHILS 90 (H) 32 - 75 %    BAND NEUTROPHILS 2 0 - 6 %    LYMPHOCYTES 7 (L) 12 - 49 %    MONOCYTES 1 (L) 5 - 13 %    EOSINOPHILS 0 0 - 7 %    BASOPHILS 0 0 - 1 %    IMMATURE GRANULOCYTES 0 %    ABS. NEUTROPHILS 20.5 (H) 1.8 - 8.0 K/UL    ABS. LYMPHOCYTES 1.6 0.8 - 3.5 K/UL    ABS. MONOCYTES 0.2 0.0 - 1.0 K/UL    ABS. EOSINOPHILS 0.0 0.0 - 0.4 K/UL    ABS. BASOPHILS 0.0 0.0 - 0.1 K/UL    ABS. IMM.  GRANS. 0.0 K/UL    DF Manual      PLATELET COMMENTS Large Platelets      RBC COMMENTS Teardrop cells  1+        RBC COMMENTS Macrocytosis  1+       RENAL FUNCTION PANEL    Collection Time: 02/26/22  4:00 AM   Result Value Ref Range    Sodium 139 136 - 145 mmol/L    Potassium 4.4 3.5 - 5.1 mmol/L    Chloride 106 97 - 108 mmol/L    CO2 26 21 - 32 mmol/L    Anion gap 7 5 - 15 mmol/L    Glucose 224 (H) 65 - 100 mg/dL    BUN 76 (H) 6 - 20 mg/dL    Creatinine 3.09 (H) 0.55 - 1.02 mg/dL    BUN/Creatinine ratio 25 (H) 12 - 20      GFR est AA 18 (L) >60 ml/min/1.73m2    GFR est non-AA 15 (L) >60 ml/min/1.73m2    Calcium 8.1 (L) 8.5 - 10.1 mg/dL    Phosphorus 5.3 (H) 2.6 - 4.7 mg/dL    Albumin 2.0 (L) 3.5 - 5.0 g/dL   GLUCOSE, POC    Collection Time: 02/26/22 12:27 PM   Result Value Ref Range Glucose (POC) 267 (H) 65 - 117 mg/dL    Performed by Tommy Milan           Intake/Output Summary (Last 24 hours) at 2/26/2022 1243  Last data filed at 2/26/2022 1240  Gross per 24 hour   Intake 80535.97 ml   Output 88714 ml   Net 8914.97 ml      Current Shift: 02/26 0701 - 02/26 1900  In: 31525   Out: 92016 [PEEVP:43363]  Last 3 Shifts: 02/24 1901 - 02/26 0700  In: 98589 [I.V.:2706.7]  Out: 20716 [UGOTV:60471]  Physical Exam  Vitals reviewed. Constitutional:       Appearance: She is ill-appearing. Cardiovascular:      Rate and Rhythm: Normal rate. Pulmonary:      Effort: Pulmonary effort is normal. No respiratory distress. Musculoskeletal:         General: No swelling or tenderness. Skin:     General: Skin is warm and dry.         Bell in place CBI and urine light pink in color  Data Review:   Recent Results (from the past 24 hour(s))   GLUCOSE, POC    Collection Time: 02/25/22 12:55 PM   Result Value Ref Range    Glucose (POC) 153 (H) 65 - 117 mg/dL    Performed by Tommy Milan    RENAL FUNCTION PANEL    Collection Time: 02/25/22  5:28 PM   Result Value Ref Range    Sodium 142 136 - 145 mmol/L    Potassium 5.6 (H) 3.5 - 5.1 mmol/L    Chloride 109 (H) 97 - 108 mmol/L    CO2 21 21 - 32 mmol/L    Anion gap 12 5 - 15 mmol/L    Glucose 247 (H) 65 - 100 mg/dL    BUN 68 (H) 6 - 20 mg/dL    Creatinine 3.18 (H) 0.55 - 1.02 mg/dL    BUN/Creatinine ratio 21 (H) 12 - 20      GFR est AA 17 (L) >60 ml/min/1.73m2    GFR est non-AA 14 (L) >60 ml/min/1.73m2    Calcium 8.2 (L) 8.5 - 10.1 mg/dL    Phosphorus 6.1 (H) 2.6 - 4.7 mg/dL    Albumin 2.0 (L) 3.5 - 5.0 g/dL   CBC WITH AUTOMATED DIFF    Collection Time: 02/25/22  5:28 PM   Result Value Ref Range    WBC 24.8 (H) 3.6 - 11.0 K/uL    RBC 2.38 (L) 3.80 - 5.20 M/uL    HGB 6.9 (L) 11.5 - 16.0 g/dL    HCT 21.1 (L) 35.0 - 47.0 %    MCV 88.7 80.0 - 99.0 FL    MCH 29.0 26.0 - 34.0 PG    MCHC 32.7 30.0 - 36.5 g/dL    RDW 17.2 (H) 11.5 - 14.5 %    PLATELET 702 473 - 072 K/uL MPV 10.8 8.9 - 12.9 FL    NRBC 0.1 (H) 0.0  WBC    ABSOLUTE NRBC 0.03 (H) 0.00 - 0.01 K/uL    NEUTROPHILS 87 (H) 32 - 75 %    BAND NEUTROPHILS 6 0 - 6 %    LYMPHOCYTES 6 (L) 12 - 49 %    MONOCYTES 1 (L) 5 - 13 %    EOSINOPHILS 0 0 - 7 %    BASOPHILS 0 0 - 1 %    IMMATURE GRANULOCYTES 0 %    ABS. NEUTROPHILS 23.1 (H) 1.8 - 8.0 K/UL    ABS. LYMPHOCYTES 1.5 0.8 - 3.5 K/UL    ABS. MONOCYTES 0.2 0.0 - 1.0 K/UL    ABS. EOSINOPHILS 0.0 0.0 - 0.4 K/UL    ABS. BASOPHILS 0.0 0.0 - 0.1 K/UL    ABS. IMM. GRANS. 0.0 K/UL    DF Manual      RBC COMMENTS Hypochromia  1+        RBC COMMENTS Microcytosis  1+       GLUCOSE, POC    Collection Time: 02/25/22  5:33 PM   Result Value Ref Range    Glucose (POC) 260 (H) 65 - 117 mg/dL    Performed by Gayle Sanchez    TYPE & SCREEN    Collection Time: 02/25/22  7:00 PM   Result Value Ref Range    Crossmatch Expiration 02/28/2022,2359     ABO/Rh(D) Chang Raji Positive     Antibody screen Negative     Unit number [id number]     Blood component type RC LR,2     Unit division 00     Status of unit [address] to transfuse     Crossmatch result Compatible    GLUCOSE, POC    Collection Time: 02/25/22 11:37 PM   Result Value Ref Range    Glucose (POC) 314 (H) 65 - 117 mg/dL    Performed by Jun Ho    CBC WITH AUTOMATED DIFF    Collection Time: 02/26/22  4:00 AM   Result Value Ref Range    WBC 22.3 (H) 3.6 - 11.0 K/uL    RBC 2.58 (L) 3.80 - 5.20 M/uL    HGB 7.5 (L) 11.5 - 16.0 g/dL    HCT 22.8 (L) 35.0 - 47.0 %    MCV 88.4 80.0 - 99.0 FL    MCH 29.1 26.0 - 34.0 PG    MCHC 32.9 30.0 - 36.5 g/dL    RDW 16.2 (H) 11.5 - 14.5 %    PLATELET 724 911 - 462 K/uL    MPV 10.9 8.9 - 12.9 FL    NRBC 0.3 (H) 0.0  WBC    ABSOLUTE NRBC 0.07 (H) 0.00 - 0.01 K/uL    NEUTROPHILS 90 (H) 32 - 75 %    BAND NEUTROPHILS 2 0 - 6 %    LYMPHOCYTES 7 (L) 12 - 49 %    MONOCYTES 1 (L) 5 - 13 %    EOSINOPHILS 0 0 - 7 %    BASOPHILS 0 0 - 1 %    IMMATURE GRANULOCYTES 0 %    ABS.  NEUTROPHILS 20.5 (H) 1.8 - 8.0 K/UL    ABS. LYMPHOCYTES 1.6 0.8 - 3.5 K/UL    ABS. MONOCYTES 0.2 0.0 - 1.0 K/UL    ABS. EOSINOPHILS 0.0 0.0 - 0.4 K/UL    ABS. BASOPHILS 0.0 0.0 - 0.1 K/UL    ABS. IMM. GRANS. 0.0 K/UL    DF Manual      PLATELET COMMENTS Large Platelets      RBC COMMENTS Teardrop cells  1+        RBC COMMENTS Macrocytosis  1+       RENAL FUNCTION PANEL    Collection Time: 02/26/22  4:00 AM   Result Value Ref Range    Sodium 139 136 - 145 mmol/L    Potassium 4.4 3.5 - 5.1 mmol/L    Chloride 106 97 - 108 mmol/L    CO2 26 21 - 32 mmol/L    Anion gap 7 5 - 15 mmol/L    Glucose 224 (H) 65 - 100 mg/dL    BUN 76 (H) 6 - 20 mg/dL    Creatinine 3.09 (H) 0.55 - 1.02 mg/dL    BUN/Creatinine ratio 25 (H) 12 - 20      GFR est AA 18 (L) >60 ml/min/1.73m2    GFR est non-AA 15 (L) >60 ml/min/1.73m2    Calcium 8.1 (L) 8.5 - 10.1 mg/dL    Phosphorus 5.3 (H) 2.6 - 4.7 mg/dL    Albumin 2.0 (L) 3.5 - 5.0 g/dL   GLUCOSE, POC    Collection Time: 02/26/22 12:27 PM   Result Value Ref Range    Glucose (POC) 267 (H) 65 - 117 mg/dL    Performed by Chisholm Splinter          XR CHEST PORT   Final Result   The cardiomediastinal silhouette is appropriate for age, technique,   and lung expansion. Pulmonary vasculature is not congested. The lungs are   essentially clear. No effusion or pneumothorax is seen. CT HEAD WO CONT   Final Result   Negative for acute intracranial process. CT ABD PELV WO CONT   Final Result   Hemorrhage within the urinary bladder. Mild left   hydroureteronephrosis. XR SWALLOW FUNC VIDEO   Final Result   Penetration with thin consistency. No aspiration. CTA HEAD NECK W WO CONT   Final Result   Negative head and neck CTA. Please note that all carotid bifurcation stenoses are measured as per NASCET   criteria. CT CODE NEURO HEAD WO CONTRAST   Final Result   1. No acute large vessel intracranial ischemia, hemorrhage.  Called report to   patient's nurse Jinger Bays at 7:12 PM 2/22/2022.   2. Right basal ganglia lacunar infarct. 3. Periventricular microangiopathy. 4. Pansinusitis. See above. MRI BRAIN WO CONT   Final Result   Acute infarction left paracentral lobule and region of the white   matter of the left motor cortex. CT CODE NEURO HEAD WO CONTRAST   Final Result      1. No acute intracranial hemorrhage. 2. Left basal ganglia lacunar infarct, which is age indeterminate in the absence   of any prior outside studies, but favored to be chronic. 3. Moderate to severe paranasal sinus disease. Results called to Dr. Wicho Woods at 9:39 PM on 2/21/2022           Patient Active Problem List   Diagnosis Code    HTN (hypertension) I10    Hyperlipidemia E78.5    Neuropathy G62.9    Sciatica M54.30    Diabetes mellitus with neurological manifestations, uncontrolled ([address] E11.49, E11.65    Infection of nailbed of toe of left foot L03.032    PAD [facility]peripheral [facility]) [facility] I73.9    Hypercalcemia E83.52    DM type 2 causing vascular disease ([address] E11.59    Type 2 diabetes mellitus with nephropathy ([address] E11.21    Acute osteomyelitis of ankle or foot ([address] M86.179    Diabetic peripheral neuropathy ([address] E11.42    Spinal stenosis in cervical region M48.02    Ischemia of both lower extremities I99.8    Pain in both lower legs M79.661, M79.662    CVA (cerebral vascular accident) ([address] I63.9    Elevated troponin R77.8        DIAGNOSES:  1. RICARDO ON CKD. Adwoa Rast grade 2  2. CKD stage 3B  3. AG metabolic acidosis-corrected  4. Urinary Tract Infection Complicated   5. Hemorrhagic UTI  6. Diabetes Mellitus II with complications  7. CVA-acute infarct left paracentral lobule and region of white matter of left motor cortex    DISCUSSION:   Acidosis much improved. Anion gap is closed.  BUN/creatinine stabilized. I's and O's are most likely inaccurate.   Body weight has not changed much   Appreciate urology input   Appreciate neurology input   Urine culture shows E. coli which is resistant to aminoglycosides and penicillin but susceptible to ceftriaxone      PLAN:  · Discontinue bicarbonate drip and normal saline at a modest rate. · Avoid hypotension  · Continue with ceftriaxone treatment        Thanks for consulting me. Please don't hesitate to contact me if any questions arise of if I can assist in any manner. This dictation was done by dragon, computer voice recognition software. Often unanticipated grammatical, syntax, phones and other interpretive errors are inadvertently transcribed. Please excuse errors that have escaped final proofreading. Please contact me if you suspect dictation or transcription errors.   Dr Lamar Blood  [address], [address], [address] [facility]  Cell Phone: [phone]  Office phone: [phone]  Fax: [phone]

## 2022-02-26 NOTE — WOUND CARE
IP WOUND CONSULT    Ede Romo  MEDICAL RECORD NUMBER:  [id number]  AGE: 78 y.o. GENDER: female  : 1945  TODAY'S DATE:  2022    GENERAL     [] Follow-up   [x] New Consult    Ede Romo is a 78 y.o. female referred by:   [x] Physician  [] Nursing  [] Other:         PAST MEDICAL HISTORY    Past Medical History:   Diagnosis Date    Diabetes mellitus ([facility] [address]     HTN (hypertension)     Hyperlipidemia     Neuropathy     PAD (peripheral artery disease) ([facility] [address]     PCI    Sciatica         PAST SURGICAL HISTORY    Past Surgical History:   Procedure Laterality Date    HX AMPUTATION Right     great toe    HX CERVICAL FUSION      HX OTHER SURGICAL Bilateral     Stent placement    HX OTHER SURGICAL      HX VASCULAR STENT Bilateral     2 in right 1 in left    HX VASCULAR STENT Bilateral        FAMILY HISTORY    Family History   Problem Relation Age of Onset    Cancer Mother     Diabetes Mother     Hypertension Mother          ALLERGIES    No Known Allergies    MEDICATIONS    No current facility-administered medications on file prior to encounter.      Current Outpatient Medications on File Prior to Encounter   Medication Sig Dispense Refill    atorvastatin (LIPITOR) 20 mg tablet TAKE 1 TABLET BY MOUTH EVERY NIGHT 30 Tablet 5    gabapentin (NEURONTIN) 300 mg capsule TAKE 2 CAPSULES BY MOUTH THREE TIMES DAILY WITH MEALS 180 Capsule 2    Blood-Glucose Meter (OneTouch Verio Flex meter) misc Test blood glucose 3 time daily Dx Code: E11.65 1 Each 0    glucose blood VI test strips (OneTouch Verio test strips) strip Test blood glucose 3 time daily Dx Code: E11.65 300 Strip 3    lancets (OneTouch Delica Plus Lancet) 33 gauge misc Test blood glucose 3 time daily Dx Code: E11.65 300 Lancet 3    BD [place] 2nd Gen Pen Needle 32 gauge x \" ndle USE TO INJECT LANTUS AND VICTOZA DAILY 200 Pen Needle 4    insulin glargine (Lantus Solostar U-100 Insulin) 100 unit/mL (3 mL) inpn 22 Units by SubCUTAneous route daily. 30 mL 2    liraglutide (Victoza 3-Bandar) 0.6 mg/0.1 mL (18 mg/3 mL) pnij ADMINISTER 1.8 MG UNDER THE SKIN DAILY. 27 mL 3    dilTIAZem ER (CARDIZEM CD) 180 mg capsule TAKE 1 CAPSULE BY MOUTH DAILY 90 Cap 0    brimonidine (ALPHAGAN) 0.2 % ophthalmic solution Administer 1 Drop to both eyes three (3) times daily.  aspirin delayed-release 81 mg tablet Take  by mouth daily.  metFORMIN (GLUCOPHAGE) 1,000 mg tablet TAKE 1/2 TABLET BY MOUTH TWICE DAILY WITH MEALS 180 Tab 1    losartan (COZAAR) 50 mg tablet TK 2 TS PO QD IN THE EVENING  1    VICTOZA 3-BANDAR 0.6 mg/0.1 mL (18 mg/3 mL) pnij ADMINISTER 1.8 MG UNDER THE SKIN EVERY MORNING 9 mL 2    clopidogrel (PLAVIX) 75 mg tab Take 75 mg by mouth daily.  CHELY PEN NEEDLE 32 gauge x 5/32\" ndle USE TO INJECT LANTUS AND VICTOZA EVERY  Pen Needle 11    ferrous sulfate 325 mg (65 mg iron) tablet Take 1 Tab by mouth two (2) times a day. 60 Tab 5    polyethylene glycol (MIRALAX) 17 gram packet Take 1 Packet by mouth daily. 30 Packet 1    oxyCODONE IR (ROXICODONE) 5 mg immediate release tablet Take 5 mg by mouth every six (6) hours as needed for Pain.  HYDROcodone-acetaminophen (NORCO) 5-325 mg per tablet Take  by mouth. [unfilled]  Visit Vitals  BP (!) 123/56   Pulse 65   Temp 97.8 °F (36.6 °C)   Resp 16   Ht 5' 7\" (1.702 m)   Wt 81.6 kg (180 lb)   SpO2 100%   BMI 28.19 kg/m²       ASSESSMENT     Wound Identification & Type: Stage 2 PI to right buttock; stage 2 PI to left buttock; DU to left 1st toe  Dressing change: Yes, see flow chart  Verbal consent for picture: Yes per daughter    Contributing Factors: anticoagulation therapy, diabetes, poor glucose control, chronic pressure, decreased mobility, shear force, incontinence of stool, incontinence of urine and Stroke    Wound Toe (Comment  which one) Anterior; Left Toe nail is greenish yellow with serosanguineous fluid 02/22/22 (Active)   Wound Image    02/25/22 1943   Wound Etiology Diabetic 02/25/22 1943   Dressing/Treatment Open to air 02/25/22 1943   Wound Assessment Dry 02/25/22 1943   Drainage Amount None 02/25/22 1943   Wound Odor None 02/25/22 1943   Number of days: 3       Wound Toe (Comment  which one) Anterior;Right Greater toe amputated 02/22/22 (Active)   Number of days: 3       Wound Buttocks 02/23/22 (Active)   Wound Image   02/25/22 1942   Wound Etiology Pressure Stage 2 02/25/22 1942   Dressing Status Clean;Dry 02/25/22 0400   Cleansed Other (Comment) 02/25/22 1942   Dressing/Treatment Foam 02/25/22 1942   Dressing Change Due 02/26/22 02/25/22 1942   Wound Assessment Pink/red;Dry 02/25/22 1942   Drainage Amount None 02/25/22 1942   Wound Odor None 02/25/22 1942   Licha-Wound/Incision Assessment Blanchable erythema; Intact 02/25/22 1942   Edges Defined edges 02/25/22 1942   Wound Thickness Description Partial thickness 02/25/22 1942   Number of days: 2       Wound Heel Left dark area (Active)   Wound Image   02/25/22 1938   Wound Etiology Other (Comment) 02/25/22 1938   Offloading for Diabetic Foot Ulcers Offloading boot 02/25/22 1938   Wound Assessment Dry 02/25/22 1938   Drainage Amount None 02/25/22 1938   Wound Odor None 02/25/22 1938   Licha-Wound/Incision Assessment Dry/flaky; Intact 02/25/22 1938   Number of days: 0          PLAN     Skin Care & Pressure Relief Recommendations  Minimize layers of linen  Turn/reposition approximately every 2 hours  Pillow wedges  Manage incontinence   Promote continence; Skin Protective lotion/cream to buttocks and sacrum daily and as needed with incontinence care  Offloading boots    Shakir 12  Blood Glucose: 260 on 2/25/22                             Albumin: 2.0 on 2/25/22  WBCs: 24.8 on 2/25/22    Support Surface: [place]. Patient will need low air loss bed or gel mattress with air pump when transfers to med-surg unit. Physician/Provider notified:   Recommendations: Healing stage 2 PIs noted to both buttocks.   Per daughter (from [facility]), patient acquired the wounds at another facility but not sure exactly how long ago. The wounds are not new and appear to be healing well. Apply thin layer of [place]rah[place] to open areas and cover with Optifoam Border Sacral foam dressing daily, see dressing order. Skin color to heels is slightly darker than surrounding skin color but intact. Dry flaky skin noted but no wounds. Maintain heel boots at all times daily while in the bed for offloading of the heels and to prevent foot drop. DU noted to left 1st toe, no drainage at this time. Per daughter, she knows a physician is treating the wound but does not recall who. Received order via telephone from Dr. Daniel Connolly to consult Dr. Maria Victoria Dillon for evaluation. Consult entered and Dr. North Paul informed of consult. Dr. North Paul is out of town and will follow up on Monday 2/28/22. Indwelling giraldo catheter in place to manage  incontinence. No loose stools noted. Minor MASD noted to abdominal fold. Apply zinc paste BID and prn for soiling to abdominal fold. Use foam wedge to turn q2h at 30 degree angle or more to offload sacrum. Maintain HOB at 30 degrees or less, if not contraindicated, to reduce pressure to buttocks and sacrum. Raise foot of bed to help prevent friction and shear injury from sliding down in the bed. Will continue to follow.           Discharge Wound Care Needs: TBD    Teaching completed with:   [] Patient           [] Family member       [] Caregiver          [] Nursing  [] Other    Patient/Caregiver Teaching:  Level of patient/caregiver understanding able to:   [] Indicates understanding       [] Needs reinforcement  [] Unsuccessful      [] Verbal Understanding  [] Demonstrated understanding       [] No evidence of learning  [] Refused teaching         [] N/A       Electronically signed by Yariel Valdes RN on 2/25/2022 at 7:50 PM

## 2022-02-26 NOTE — PROGRESS NOTES
Progress Note      2/26/2022 1:35 PM  NAME: Nazario Arteaga   MRN:  [id number]   Admit Diagnosis: CVA (cerebral vascular accident) ([facility] [address] [I63.9]  Elevated troponin [R77.8]      Problem List:   Altered mental status  Nonspecific troponin leak  Acute non-Q MI type II  Chronic kidney disease  Anemia     Assessment/Plan:   Not an appropriate candidate for invasive cardiac management  Plan on conservative treatment of troponin leak  Granddaughter at bedside, discussed the plan of care with her         []       High complexity decision making was performed in this patient at high risk for decompensation with multiple organ involvement. Subjective:     Nazario Arteaga nonverbal.  Discussed with RN events overnight. Review of Systems:   Negative except for as noted above. Objective:      Physical Exam:    Last 24hrs VS reviewed since prior progress note. Most recent are:    Visit Vitals  BP (!) 120/43 (BP 1 Location: Left upper arm, BP Patient Position: At rest)   Pulse 66   Temp 97.7 °F (36.5 °C)   Resp 15   Ht 5' 7\" (1.702 m)   Wt 81.5 kg (179 lb 10.8 oz) Comment: ICU bed, 1 pillow, bottom sheet, top sheet, 1 towel only   SpO2 100%   BMI 28.14 kg/m²       Intake/Output Summary (Last 24 hours) at 2/26/2022 1335  Last data filed at 2/26/2022 1240  Gross per 24 hour   Intake 67667.97 ml   Output 96082 ml   Net 5914.97 ml        General Appearance:  no acute distress. Ears/Nose/Mouth/Throat: moist mucous membranes  Neck: Supple. Chest: Lungs clear to auscultation bilaterally. Cardiovascular:  S1S2 normal  Abdomen: Soft, non-tender, bowel sounds are active. Extremities: No edema bilaterally. Skin: Warm and dry. []         Post-cath site without hematoma, bruit, tenderness, or thrill. Distal pulses intact.     PMH/SH reviewed - no change compared to H&P    Telemetry: Sinus rhythm in the 60s    EKG: Sinus rhythm with LVH        []  No new EKG for review    Lab Data Personally Reviewed:    Recent Labs 02/26/22  0400 02/25/22  1728   WBC 22.3* 24.8*   HGB 7.5* 6.9*   HCT 22.8* 21.1*    349     No results for input(s): INR, PTP, APTT, INREXT in the last 72 hours. Recent Labs     02/26/22  0400 02/25/22  1728 02/25/22  1230    142 141   K 4.4 5.6* 5.1    109* 111*   CO2 26 21 20*   BUN 76* 68* 64*   CREA 3.09* 3.18* 2.90*   * 247* 130*   CA 8.1* 8.2* 8.7     Recent Labs     02/25/22  1230   *     Lab Results   Component Value Date/Time    Cholesterol, total 213 (H) 09/25/2019 02:22 PM    HDL Cholesterol 39 (L) 09/25/2019 02:22 PM    LDL,Direct 79 06/28/2021 12:00 AM    LDL, calculated 135 (H) 09/25/2019 02:22 PM    Triglyceride 194 (H) 09/25/2019 02:22 PM       Recent Labs     02/26/22  0400 02/25/22  1728 02/25/22  1230   ALB 2.0* 2.0* 2.1*     No results for input(s): PH, PCO2, PO2 in the last 72 hours.     Medications Personally Reviewed:    Current Facility-Administered Medications   Medication Dose Route Frequency    gabapentin (NEURONTIN) capsule 100 mg  100 mg Oral DAILY    0.9% sodium chloride infusion  50 mL/hr IntraVENous CONTINUOUS    0.9% sodium chloride infusion 250 mL  250 mL IntraVENous PRN    zinc oxide-white petrolatum 20-75 % topical paste   Topical BID    cefTRIAXone (ROCEPHIN) 1 g in sterile water (preservative free) 10 mL IV syringe  1 g IntraVENous Q24H    dicyclomine (BENTYL) capsule 10 mg  10 mg Oral TID    lidocaine (XYLOCAINE) 4 % (40 mg/mL) topical solution   Topical PRN    [Held by provider] rivaroxaban (XARELTO) tablet 2.5 mg  2.5 mg Oral BID WITH MEALS    atorvastatin (LIPITOR) tablet 80 mg  80 mg Oral DAILY    labetaloL (NORMODYNE;TRANDATE) 20 mg/4 mL (5 mg/mL) injection 10 mg  10 mg IntraVENous Q6H PRN    sodium chloride (NS) flush 5-40 mL  5-40 mL IntraVENous Q8H    sodium chloride (NS) flush 5-40 mL  5-40 mL IntraVENous PRN    brimonidine (ALPHAGAN) 0.2 % ophthalmic solution 1 Drop  1 Drop Both Eyes TID    ferrous sulfate tablet 325 mg 325 mg Oral BID    insulin lispro (HUMALOG) injection   SubCUTAneous Q6H    glucose chewable tablet 16 g  4 Tablet Oral PRN    glucagon (GLUCAGEN) injection 1 mg  1 mg IntraMUSCular PRN    dextrose 10% infusion 125-250 mL  125-250 mL IntraVENous PRN    metoprolol succinate (TOPROL-XL) XL tablet 50 mg  50 mg Oral DAILY    amLODIPine (NORVASC) tablet 10 mg  10 mg Oral DAILY         Bria Hurst MD

## 2022-02-26 NOTE — PROGRESS NOTES
Right forearm, Lateral PIV failed with swelling noted,  non compressible vein. Proximal PIV patent with brisk blood return, compressible vein. 02/26/22 1755   Peripheral IV 02/25/22 Anterior;Proximal;Right Forearm   Placement Date/Time: 02/25/22 1851   Number of Attempts: 1  Inserted By: Lizz black RN  Size: (c) 20 G  Orientation: Anterior;Proximal;Right  Location: Forearm   Site Assessment Clean, dry, & intact  (assess with US)   Phlebitis Assessment 0   Infiltration Assessment 0   Dressing Status New   Dressing Type Tape;Transparent   Hub Color/Line Status Infusing;Patent; Flushed   Action Taken Dressing changed; Other (comment)  (blood return is brisk)

## 2022-02-26 NOTE — PROGRESS NOTES
General Daily Progress Note          Patient Name:   Mya Merrill       YOB: 1945       Age:  78 y.o.       Admit Date: 2/21/2022      Subjective:         Patient resting in the bed not in any distress    Getting CBI             Objective:     Visit Vitals  /60   Pulse 64   Temp 97.6 °F (36.4 °C)   Resp 14   Ht 5' 7\" (1.702 m)   Wt 81.6 kg (180 lb)   SpO2 100%   BMI 28.19 kg/m²        Recent Results (from the past 24 hour(s))   EKG, 12 LEAD, SUBSEQUENT    Collection Time: 02/25/22 10:38 AM   Result Value Ref Range    Ventricular Rate 59 BPM    Atrial Rate 59 BPM    P-R Interval 154 ms    QRS Duration 100 ms    Q-T Interval 516 ms    QTC Calculation (Bezet) 510 ms    Calculated P Axis 48 degrees    Calculated R Axis 45 degrees    Calculated T Axis 40 degrees    Diagnosis       Sinus bradycardia  Prolonged QT  Abnormal ECG  When compared with ECG of 22-FEB-2022 17:53,  No significant change was found  Confirmed by Emelia Farooq MD, SANKET ([id number]) on 2/25/2022 10:49:34 AM     LACTIC ACID    Collection Time: 02/25/22 12:30 PM   Result Value Ref Range    Lactic acid 3.0 (HH) 0.4 - 2.0 mmol/L   CK    Collection Time: 02/25/22 12:30 PM   Result Value Ref Range     (H) 26 - 192 U/L   CULTURE, BLOOD    Collection Time: 02/25/22 12:30 PM    Specimen: Blood   Result Value Ref Range    Special Requests: No Special Requests      Culture result: No growth after 18 hours     RENAL FUNCTION PANEL    Collection Time: 02/25/22 12:30 PM   Result Value Ref Range    Sodium 141 136 - 145 mmol/L    Potassium 5.1 3.5 - 5.1 mmol/L    Chloride 111 (H) 97 - 108 mmol/L    CO2 20 (L) 21 - 32 mmol/L    Anion gap 10 5 - 15 mmol/L    Glucose 130 (H) 65 - 100 mg/dL    BUN 64 (H) 6 - 20 mg/dL    Creatinine 2.90 (H) 0.55 - 1.02 mg/dL    BUN/Creatinine ratio 22 (H) 12 - 20      GFR est AA 19 (L) >60 ml/min/1.73m2    GFR est non-AA 16 (L) >60 ml/min/1.73m2    Calcium 8.7 8.5 - 10.1 mg/dL    Phosphorus 5.3 (H) 2.6 - 4.7 mg/dL Albumin 2.1 (L) 3.5 - 5.0 g/dL   GLUCOSE, POC    Collection Time: 02/25/22 12:55 PM   Result Value Ref Range    Glucose (POC) 153 (H) 65 - 117 mg/dL    Performed by [facility]    RENAL FUNCTION PANEL    Collection Time: 02/25/22  5:28 PM   Result Value Ref Range    Sodium 142 136 - 145 mmol/L    Potassium 5.6 (H) 3.5 - 5.1 mmol/L    Chloride 109 (H) 97 - 108 mmol/L    CO2 21 21 - 32 mmol/L    Anion gap 12 5 - 15 mmol/L    Glucose 247 (H) 65 - 100 mg/dL    BUN 68 (H) 6 - 20 mg/dL    Creatinine 3.18 (H) 0.55 - 1.02 mg/dL    BUN/Creatinine ratio 21 (H) 12 - 20      GFR est AA 17 (L) >60 ml/min/1.73m2    GFR est non-AA 14 (L) >60 ml/min/1.73m2    Calcium 8.2 (L) 8.5 - 10.1 mg/dL    Phosphorus 6.1 (H) 2.6 - 4.7 mg/dL    Albumin 2.0 (L) 3.5 - 5.0 g/dL   CBC WITH AUTOMATED DIFF    Collection Time: 02/25/22  5:28 PM   Result Value Ref Range    WBC 24.8 (H) 3.6 - 11.0 K/uL    RBC 2.38 (L) 3.80 - 5.20 M/uL    HGB 6.9 (L) 11.5 - 16.0 g/dL    HCT 21.1 (L) 35.0 - 47.0 %    MCV 88.7 80.0 - 99.0 FL    MCH 29.0 26.0 - 34.0 PG    MCHC 32.7 30.0 - 36.5 g/dL    RDW 17.2 (H) 11.5 - 14.5 %    PLATELET 643 030 - 844 K/uL    MPV 10.8 8.9 - 12.9 FL    NRBC 0.1 (H) 0.0  WBC    ABSOLUTE NRBC 0.03 (H) 0.00 - 0.01 K/uL    NEUTROPHILS 87 (H) 32 - 75 %    BAND NEUTROPHILS 6 0 - 6 %    LYMPHOCYTES 6 (L) 12 - 49 %    MONOCYTES 1 (L) 5 - 13 %    EOSINOPHILS 0 0 - 7 %    BASOPHILS 0 0 - 1 %    IMMATURE GRANULOCYTES 0 %    ABS. NEUTROPHILS 23.1 (H) 1.8 - 8.0 K/UL    ABS. LYMPHOCYTES 1.5 0.8 - 3.5 K/UL    ABS. MONOCYTES 0.2 0.0 - 1.0 K/UL    ABS. EOSINOPHILS 0.0 0.0 - 0.4 K/UL    ABS. BASOPHILS 0.0 0.0 - 0.1 K/UL    ABS. IMM.  GRANS. 0.0 K/UL    DF Manual      RBC COMMENTS Hypochromia  1+        RBC COMMENTS Microcytosis  1+       GLUCOSE, POC    Collection Time: 02/25/22  5:33 PM   Result Value Ref Range    Glucose (POC) 260 (H) 65 - 117 mg/dL    Performed by [address] [place] [address]    Collection Time: 02/25/22  7:00 PM   Result Value Ref Range    Crossmatch Expiration 02/28/2022,2359     ABO/Rh(D) O Positive     Antibody screen Negative     Unit number [id number]     Blood component type RC LR,2     Unit division 00     Status of unit [address] to transfuse     Crossmatch result Compatible    GLUCOSE, POC    Collection Time: 02/25/22 11:37 PM   Result Value Ref Range    Glucose (POC) 314 (H) 65 - 117 mg/dL    Performed by Viji Evans    CBC WITH AUTOMATED DIFF    Collection Time: 02/26/22  4:00 AM   Result Value Ref Range    WBC 22.3 (H) 3.6 - 11.0 K/uL    RBC 2.58 (L) 3.80 - 5.20 M/uL    HGB 7.5 (L) 11.5 - 16.0 g/dL    HCT 22.8 (L) 35.0 - 47.0 %    MCV 88.4 80.0 - 99.0 FL    MCH 29.1 26.0 - 34.0 PG    MCHC 32.9 30.0 - 36.5 g/dL    RDW 16.2 (H) 11.5 - 14.5 %    PLATELET 758 024 - 672 K/uL    MPV 10.9 8.9 - 12.9 FL    NRBC 0.3 (H) 0.0  WBC    ABSOLUTE NRBC 0.07 (H) 0.00 - 0.01 K/uL    NEUTROPHILS 90 (H) 32 - 75 %    BAND NEUTROPHILS 2 0 - 6 %    LYMPHOCYTES 7 (L) 12 - 49 %    MONOCYTES 1 (L) 5 - 13 %    EOSINOPHILS 0 0 - 7 %    BASOPHILS 0 0 - 1 %    IMMATURE GRANULOCYTES 0 %    ABS. NEUTROPHILS 20.5 (H) 1.8 - 8.0 K/UL    ABS. LYMPHOCYTES 1.6 0.8 - 3.5 K/UL    ABS. MONOCYTES 0.2 0.0 - 1.0 K/UL    ABS. EOSINOPHILS 0.0 0.0 - 0.4 K/UL    ABS. BASOPHILS 0.0 0.0 - 0.1 K/UL    ABS. IMM.  GRANS. 0.0 K/UL    DF Manual      PLATELET COMMENTS Large Platelets      RBC COMMENTS Teardrop cells  1+        RBC COMMENTS Macrocytosis  1+       RENAL FUNCTION PANEL    Collection Time: 02/26/22  4:00 AM   Result Value Ref Range    Sodium 139 136 - 145 mmol/L    Potassium 4.4 3.5 - 5.1 mmol/L    Chloride 106 97 - 108 mmol/L    CO2 26 21 - 32 mmol/L    Anion gap 7 5 - 15 mmol/L    Glucose 224 (H) 65 - 100 mg/dL    BUN 76 (H) 6 - 20 mg/dL    Creatinine 3.09 (H) 0.55 - 1.02 mg/dL    BUN/Creatinine ratio 25 (H) 12 - 20      GFR est AA 18 (L) >60 ml/min/1.73m2    GFR est non-AA 15 (L) >60 ml/min/1.73m2    Calcium 8.1 (L) 8.5 - 10.1 mg/dL Phosphorus 5.3 (H) 2.6 - 4.7 mg/dL    Albumin 2.0 (L) 3.5 - 5.0 g/dL     [unfilled]      Review of Systems    Constitutional: Negative for chills and fever. HENT: Negative. Eyes: Negative. Respiratory: Negative. Cardiovascular: Negative. Gastrointestinal: Negative for abdominal pain and nausea. Skin: Negative. Neurological: Negative. Physical Exam:      Constitutional: pt is oriented to person, place, and time. HENT:   Head: Normocephalic and atraumatic. Eyes: Pupils are equal, round, and reactive to light. EOM are normal.   Cardiovascular: Normal rate, regular rhythm and normal heart sounds. Pulmonary/Chest: Breath sounds normal. No wheezes. No rales. Exhibits no tenderness. Abdominal: Soft. Bowel sounds are normal. There is no abdominal tenderness. There is no rebound and no guarding. Musculoskeletal: Normal range of motion. Neurological: pt is alert and oriented to person, place, and time. XR CHEST PORT   Final Result   The cardiomediastinal silhouette is appropriate for age, technique,   and lung expansion. Pulmonary vasculature is not congested. The lungs are   essentially clear. No effusion or pneumothorax is seen. CT HEAD WO CONT   Final Result   Negative for acute intracranial process. CT ABD PELV WO CONT   Final Result   Hemorrhage within the urinary bladder. Mild left   hydroureteronephrosis. XR SWALLOW FUNC VIDEO   Final Result   Penetration with thin consistency. No aspiration. CTA HEAD NECK W WO CONT   Final Result   Negative head and neck CTA. Please note that all carotid bifurcation stenoses are measured as per NASCET   criteria. CT CODE NEURO HEAD WO CONTRAST   Final Result   1. No acute large vessel intracranial ischemia, hemorrhage. Called report to   patient's nurse Jack Morris at 7:12 PM 2/22/2022.   2. Right basal ganglia lacunar infarct. 3. Periventricular microangiopathy. 4. Pansinusitis. See above.       MRI BRAIN WO CONT   Final Result   Acute infarction left paracentral lobule and region of the white   matter of the left motor cortex. CT CODE NEURO HEAD WO CONTRAST   Final Result      1. No acute intracranial hemorrhage. 2. Left basal ganglia lacunar infarct, which is age indeterminate in the absence   of any prior outside studies, but favored to be chronic. 3. Moderate to severe paranasal sinus disease.       Results called to Dr. Shane Tee at 9:39 PM on 2/21/2022           Recent Results (from the past 24 hour(s))   EKG, 12 LEAD, SUBSEQUENT    Collection Time: 02/25/22 10:38 AM   Result Value Ref Range    Ventricular Rate 59 BPM    Atrial Rate 59 BPM    P-R Interval 154 ms    QRS Duration 100 ms    Q-T Interval 516 ms    QTC Calculation (Bezet) 510 ms    Calculated P Axis 48 degrees    Calculated R Axis 45 degrees    Calculated T Axis 40 degrees    Diagnosis       Sinus bradycardia  Prolonged QT  Abnormal ECG  When compared with ECG of 22-FEB-2022 17:53,  No significant change was found  Confirmed by Raymon Pacheco MD, SANKET ([id number]) on 2/25/2022 10:49:34 AM     LACTIC ACID    Collection Time: 02/25/22 12:30 PM   Result Value Ref Range    Lactic acid 3.0 (HH) 0.4 - 2.0 mmol/L   CK    Collection Time: 02/25/22 12:30 PM   Result Value Ref Range     (H) 26 - 192 U/L   CULTURE, BLOOD    Collection Time: 02/25/22 12:30 PM    Specimen: Blood   Result Value Ref Range    Special Requests: No Special Requests      Culture result: No growth after 18 hours     RENAL FUNCTION PANEL    Collection Time: 02/25/22 12:30 PM   Result Value Ref Range    Sodium 141 136 - 145 mmol/L    Potassium 5.1 3.5 - 5.1 mmol/L    Chloride 111 (H) 97 - 108 mmol/L    CO2 20 (L) 21 - 32 mmol/L    Anion gap 10 5 - 15 mmol/L    Glucose 130 (H) 65 - 100 mg/dL    BUN 64 (H) 6 - 20 mg/dL    Creatinine 2.90 (H) 0.55 - 1.02 mg/dL    BUN/Creatinine ratio 22 (H) 12 - 20      GFR est AA 19 (L) >60 ml/min/1.73m2    GFR est non-AA 16 (L) >60 ml/min/1.73m2 Calcium 8.7 8.5 - 10.1 mg/dL    Phosphorus 5.3 (H) 2.6 - 4.7 mg/dL    Albumin 2.1 (L) 3.5 - 5.0 g/dL   GLUCOSE, POC    Collection Time: 02/25/22 12:55 PM   Result Value Ref Range    Glucose (POC) 153 (H) 65 - 117 mg/dL    Performed by [facility]    RENAL FUNCTION PANEL    Collection Time: 02/25/22  5:28 PM   Result Value Ref Range    Sodium 142 136 - 145 mmol/L    Potassium 5.6 (H) 3.5 - 5.1 mmol/L    Chloride 109 (H) 97 - 108 mmol/L    CO2 21 21 - 32 mmol/L    Anion gap 12 5 - 15 mmol/L    Glucose 247 (H) 65 - 100 mg/dL    BUN 68 (H) 6 - 20 mg/dL    Creatinine 3.18 (H) 0.55 - 1.02 mg/dL    BUN/Creatinine ratio 21 (H) 12 - 20      GFR est AA 17 (L) >60 ml/min/1.73m2    GFR est non-AA 14 (L) >60 ml/min/1.73m2    Calcium 8.2 (L) 8.5 - 10.1 mg/dL    Phosphorus 6.1 (H) 2.6 - 4.7 mg/dL    Albumin 2.0 (L) 3.5 - 5.0 g/dL   CBC WITH AUTOMATED DIFF    Collection Time: 02/25/22  5:28 PM   Result Value Ref Range    WBC 24.8 (H) 3.6 - 11.0 K/uL    RBC 2.38 (L) 3.80 - 5.20 M/uL    HGB 6.9 (L) 11.5 - 16.0 g/dL    HCT 21.1 (L) 35.0 - 47.0 %    MCV 88.7 80.0 - 99.0 FL    MCH 29.0 26.0 - 34.0 PG    MCHC 32.7 30.0 - 36.5 g/dL    RDW 17.2 (H) 11.5 - 14.5 %    PLATELET 945 403 - 602 K/uL    MPV 10.8 8.9 - 12.9 FL    NRBC 0.1 (H) 0.0  WBC    ABSOLUTE NRBC 0.03 (H) 0.00 - 0.01 K/uL    NEUTROPHILS 87 (H) 32 - 75 %    BAND NEUTROPHILS 6 0 - 6 %    LYMPHOCYTES 6 (L) 12 - 49 %    MONOCYTES 1 (L) 5 - 13 %    EOSINOPHILS 0 0 - 7 %    BASOPHILS 0 0 - 1 %    IMMATURE GRANULOCYTES 0 %    ABS. NEUTROPHILS 23.1 (H) 1.8 - 8.0 K/UL    ABS. LYMPHOCYTES 1.5 0.8 - 3.5 K/UL    ABS. MONOCYTES 0.2 0.0 - 1.0 K/UL    ABS. EOSINOPHILS 0.0 0.0 - 0.4 K/UL    ABS. BASOPHILS 0.0 0.0 - 0.1 K/UL    ABS. IMM.  GRANS. 0.0 K/UL    DF Manual      RBC COMMENTS Hypochromia  1+        RBC COMMENTS Microcytosis  1+       GLUCOSE, POC    Collection Time: 02/25/22  5:33 PM   Result Value Ref Range    Glucose (POC) 260 (H) 65 - 117 mg/dL    Performed by Patel Rodriguez TYPE & SCREEN    Collection Time: 02/25/22  7:00 PM   Result Value Ref Range    Crossmatch Expiration 02/28/2022,2359     ABO/Rh(D) Jose Antonio Woodall Positive     Antibody screen Negative     Unit number [id number]     Blood component type RC LR,2     Unit division 00     Status of unit [address] to transfuse     Crossmatch result Compatible    GLUCOSE, POC    Collection Time: 02/25/22 11:37 PM   Result Value Ref Range    Glucose (POC) 314 (H) 65 - 117 mg/dL    Performed by Peter Mariano    CBC WITH AUTOMATED DIFF    Collection Time: 02/26/22  4:00 AM   Result Value Ref Range    WBC 22.3 (H) 3.6 - 11.0 K/uL    RBC 2.58 (L) 3.80 - 5.20 M/uL    HGB 7.5 (L) 11.5 - 16.0 g/dL    HCT 22.8 (L) 35.0 - 47.0 %    MCV 88.4 80.0 - 99.0 FL    MCH 29.1 26.0 - 34.0 PG    MCHC 32.9 30.0 - 36.5 g/dL    RDW 16.2 (H) 11.5 - 14.5 %    PLATELET 680 087 - 096 K/uL    MPV 10.9 8.9 - 12.9 FL    NRBC 0.3 (H) 0.0  WBC    ABSOLUTE NRBC 0.07 (H) 0.00 - 0.01 K/uL    NEUTROPHILS 90 (H) 32 - 75 %    BAND NEUTROPHILS 2 0 - 6 %    LYMPHOCYTES 7 (L) 12 - 49 %    MONOCYTES 1 (L) 5 - 13 %    EOSINOPHILS 0 0 - 7 %    BASOPHILS 0 0 - 1 %    IMMATURE GRANULOCYTES 0 %    ABS. NEUTROPHILS 20.5 (H) 1.8 - 8.0 K/UL    ABS. LYMPHOCYTES 1.6 0.8 - 3.5 K/UL    ABS. MONOCYTES 0.2 0.0 - 1.0 K/UL    ABS. EOSINOPHILS 0.0 0.0 - 0.4 K/UL    ABS. BASOPHILS 0.0 0.0 - 0.1 K/UL    ABS. IMM.  GRANS. 0.0 K/UL    DF Manual      PLATELET COMMENTS Large Platelets      RBC COMMENTS Teardrop cells  1+        RBC COMMENTS Macrocytosis  1+       RENAL FUNCTION PANEL    Collection Time: 02/26/22  4:00 AM   Result Value Ref Range    Sodium 139 136 - 145 mmol/L    Potassium 4.4 3.5 - 5.1 mmol/L    Chloride 106 97 - 108 mmol/L    CO2 26 21 - 32 mmol/L    Anion gap 7 5 - 15 mmol/L    Glucose 224 (H) 65 - 100 mg/dL    BUN 76 (H) 6 - 20 mg/dL    Creatinine 3.09 (H) 0.55 - 1.02 mg/dL    BUN/Creatinine ratio 25 (H) 12 - 20      GFR est AA 18 (L) >60 ml/min/1.73m2    GFR est non-AA 15 (L) >60 ml/min/1.73m2    Calcium 8.1 (L) 8.5 - 10.1 mg/dL    Phosphorus 5.3 (H) 2.6 - 4.7 mg/dL    Albumin 2.0 (L) 3.5 - 5.0 g/dL       Results     Procedure Component Value Units Date/Time    CULTURE, BLOOD [[id number]] Collected: 02/25/22 1230    Order Status: Completed Specimen: Blood Updated: 02/26/22 0924     Special Requests: No Special Requests        Culture result: No growth after 18 hours       CULTURE, URINE [[id number]]  (Abnormal) Collected: 02/23/22 1100    Order Status: Completed Specimen: Urine Updated: 02/25/22 1157     Special Requests: No Special Requests        [place] Count --        >100,000  colonies/ml       Culture result:       Gram Negative Rods Idenfitication and susceptibility to follow                 Labs:     Recent Labs     02/26/22  0400 02/25/22  1728   WBC 22.3* 24.8*   HGB 7.5* 6.9*   HCT 22.8* 21.1*    349     Recent Labs     02/26/22  0400 02/25/22  1728 02/25/22  1230    142 141   K 4.4 5.6* 5.1    109* 111*   CO2 26 21 20*   BUN 76* 68* 64*   CREA 3.09* 3.18* 2.90*   * 247* 130*   CA 8.1* 8.2* 8.7   PHOS 5.3* 6.1* 5.3*     Recent Labs     02/26/22  0400 02/25/22  1728 02/25/22  1230   ALB 2.0* 2.0* 2.1*     No results for input(s): INR, PTP, APTT, INREXT in the last 72 hours. No results for input(s): FE, TIBC, PSAT, FERR in the last 72 hours. No results found for: FOL, RBCF   No results for input(s): PH, PCO2, PO2 in the last 72 hours.   Recent Labs     02/25/22  1230   *     Lab Results   Component Value Date/Time    Cholesterol, total 213 (H) 09/25/2019 02:22 PM    HDL Cholesterol 39 (L) 09/25/2019 02:22 PM    LDL,Direct 79 06/28/2021 12:00 AM    LDL, calculated 135 (H) 09/25/2019 02:22 PM    Triglyceride 194 (H) 09/25/2019 02:22 PM     Lab Results   Component Value Date/Time    Glucose (POC) 314 (H) 02/25/2022 11:37 PM    Glucose (POC) 260 (H) 02/25/2022 05:33 PM    Glucose (POC) 153 (H) 02/25/2022 12:55 PM    Glucose (POC) 109 02/25/2022 09:43 AM    Glucose (POC) 85 02/25/2022 05:45 AM     No results found for: COLOR, APPRN, SPGRU, REFSG, ARIEL, PROTU, GLUCU, KETU, BILU, UROU, VICTORIANO, LEUKU, GLUKE, EPSU, BACTU, WBCU, RBCU, CASTS, UCRY      Assessment:     Acute kidney injury on chronic kidney injury stage IIIb  Metabolic acidosis  Anemia of chronic disease  Acute blood loss anemia  Complicated UTI  Type 2 diabetes  Neuropathy  History of CVA  Hemorrhagic UTI  Leukocytosis  Questionable seizures  Hypertension      Plan:      On amlodipine 10 mg daily  Lipitor 80 mg daily  Rocephin 1 g every 24 hours  Bentyl 10 mg 3 times a day  Ferrous sulfate 325 mg twice a day  Gabapentin 100 mg daily  Sliding-scale insulin  Metoprolol 50 mg daily    Continue current treatment    Follow-up with urology regarding CBI  Follow-up the cultures    Start on tube feeding        Current Facility-Administered Medications:     gabapentin (NEURONTIN) capsule 100 mg, 100 mg, Oral, DAILY, Silvano Guevara MD, 100 mg at 02/25/22 0112    0.9% sodium chloride infusion, 50 mL/hr, IntraVENous, CONTINUOUS, Patricio Louise MD, Last Rate: 50 mL/hr at 02/26/22 0950, 50 mL/hr at 02/26/22 0950    0.9% sodium chloride infusion 250 mL, 250 mL, IntraVENous, PRN, Michael Louise MD    zinc oxide-white petrolatum 20-75 % topical paste, , Topical, BID, Silvano Guevara MD, Given at 02/25/22 2209    HYDROcodone-acetaminophen (NORCO) 5-325 mg per tablet 1 Tablet, 1 Tablet, Oral, Q4H PRN, Seth JACKSON MD, 1 Tablet at 02/26/22 0123    cefTRIAXone (ROCEPHIN) 1 g in sterile water (preservative free) 10 mL IV syringe, 1 g, IntraVENous, Q24H, Orestes Norman MD, 1 g at 02/25/22 1815    dicyclomine (BENTYL) capsule 10 mg, 10 mg, Oral, TID, Orestes Norman MD, 10 mg at 02/26/22 0901    lidocaine (XYLOCAINE) 4 % (40 mg/mL) topical solution, , Topical, PRN, Seth JACKSON MD, 1 mL at 02/24/22 2136    [Held by provider] rivaroxaban (XARELTO) tablet 2.5 mg, 2.5 mg, Oral, BID WITH MEALS, Adam Mcdonald MD, 2.5 mg at 02/24/22 1720    atorvastatin (LIPITOR) tablet 80 mg, 80 mg, Oral, DAILY, Adam Mcdonald MD, 80 mg at 02/26/22 0900    labetaloL (NORMODYNE;TRANDATE) 20 mg/4 mL (5 mg/mL) injection 10 mg, 10 mg, IntraVENous, Q6H PRN, Adam Mcdonald MD    sodium chloride (NS) flush 5-40 mL, 5-40 mL, IntraVENous, Q8H, Orestes Norman MD, 10 mL at 02/26/22 0515    sodium chloride (NS) flush 5-40 mL, 5-40 mL, IntraVENous, PRN, Orestes Norman MD    brimonidine (ALPHAGAN) 0.2 % ophthalmic solution 1 Drop, 1 Drop, Both Eyes, TID, Orestes Norman MD, 1 Drop at 02/26/22 0901    ferrous sulfate tablet 325 mg, 325 mg, Oral, BID, Orestes Norman MD, 325 mg at 02/26/22 0901    insulin lispro (HUMALOG) injection, , SubCUTAneous, Q6H, Orestes Norman MD, 6 Units at 02/26/22 0557    glucose chewable tablet 16 g, 4 Tablet, Oral, PRN, Bam JACKSON MD    glucagon (GLUCAGEN) injection 1 mg, 1 mg, IntraMUSCular, PRN, Orestes Ching MD    dextrose 10% infusion 125-250 mL, 125-250 mL, IntraVENous, PRN, Orestes Ching MD    metoprolol succinate (TOPROL-XL) XL tablet 50 mg, 50 mg, Oral, DAILY, Rod Natarajan MD, 50 mg at 02/24/22 0911    amLODIPine (NORVASC) tablet 10 mg, 10 mg, Oral, DAILY, Rod Natarajan MD, 10 mg at 02/24/22 3787

## 2022-02-26 NOTE — PROGRESS NOTES
Attempted bedside swallow re-evaluation. Granddaughter at bedside. Patient not appropriate; too lethargic/sedated and unable to follow simple commands. ST will continue to follow and re-eval once medically appropriate.

## 2022-02-26 NOTE — PROGRESS NOTES
Problem: Falls - Risk of  Goal: *Absence of Falls  Description: Document Vernia Colder Fall Risk and appropriate interventions in the flowsheet. Outcome: Progressing Towards Goal  Note: Fall Risk Interventions:       Mentation Interventions: Adequate sleep, hydration, pain control,More frequent rounding    Medication Interventions: Evaluate medications/consider consulting pharmacy    Elimination Interventions:  Toileting schedule/hourly rounds    History of Falls Interventions: Bed/chair exit alarm,Door open when patient unattended

## 2022-02-26 NOTE — PROGRESS NOTES
Consult received and chart reviewed we spoke to nurse and we attempted to see patient. Patient is not appropriate at this time for PT re-evaluation. She was no able to participate in therapy, due to increase lethargy/sedation.

## 2022-02-26 NOTE — PROGRESS NOTES
Per the granddaughter, the patient's primary care physician is Chanel Atkins with [facility] [address] in [place].

## 2022-02-26 NOTE — PROGRESS NOTES
Neurology Progress Note    Patient ID:  Zion Stephens  [id number]  20 y.o.  1945    Subjective: The patient is seen in follow-up this morning for question of seizure after stroke she is lethargic though arousable. She was given 2 mg of Ativan the night before and also Norco tablet for her neuropathic pain in the legs due to diabetic neuropathy. Patient is a 78year old woman with HTN, DM who had a stroke last week with right facial droop and right sided weakness worked up in [facility] who was brought in for worsening speech and left sided weakness per daughters at bedside. She is living at home and uses a walker to ambulate at baseline. Ct head done yesterday shows old basal ganglia stroke. Her BP are elevated to 914Y systolic.     Current Facility-Administered Medications   Medication Dose Route Frequency    gabapentin (NEURONTIN) capsule 100 mg  100 mg Oral DAILY    0.9% sodium chloride infusion  50 mL/hr IntraVENous CONTINUOUS    0.9% sodium chloride infusion 250 mL  250 mL IntraVENous PRN    zinc oxide-white petrolatum 20-75 % topical paste   Topical BID    cefTRIAXone (ROCEPHIN) 1 g in sterile water (preservative free) 10 mL IV syringe  1 g IntraVENous Q24H    dicyclomine (BENTYL) capsule 10 mg  10 mg Oral TID    lidocaine (XYLOCAINE) 4 % (40 mg/mL) topical solution   Topical PRN    [Held by provider] rivaroxaban (XARELTO) tablet 2.5 mg  2.5 mg Oral BID WITH MEALS    atorvastatin (LIPITOR) tablet 80 mg  80 mg Oral DAILY    labetaloL (NORMODYNE;TRANDATE) 20 mg/4 mL (5 mg/mL) injection 10 mg  10 mg IntraVENous Q6H PRN    sodium chloride (NS) flush 5-40 mL  5-40 mL IntraVENous Q8H    sodium chloride (NS) flush 5-40 mL  5-40 mL IntraVENous PRN    brimonidine (ALPHAGAN) 0.2 % ophthalmic solution 1 Drop  1 Drop Both Eyes TID    ferrous sulfate tablet 325 mg  325 mg Oral BID    insulin lispro (HUMALOG) injection   SubCUTAneous Q6H    glucose chewable tablet 16 g  4 Tablet Oral PRN    glucagon (GLUCAGEN) injection 1 mg  1 mg IntraMUSCular PRN    dextrose 10% infusion 125-250 mL  125-250 mL IntraVENous PRN    metoprolol succinate (TOPROL-XL) XL tablet 50 mg  50 mg Oral DAILY    amLODIPine (NORVASC) tablet 10 mg  10 mg Oral DAILY          Objective:     Patient Vitals for the past 8 hrs:   BP Temp Pulse Resp SpO2 Weight   02/26/22 1000 -- -- 66 15 100 % --   02/26/22 0952 -- -- -- -- -- 81.5 kg (179 lb 10.8 oz)   02/26/22 0935 -- -- -- -- 100 % --   02/26/22 0900 (!) 129/59 -- 65 16 100 % --   02/26/22 0800 130/60 -- 64 14 100 % --   02/26/22 0700 (!) 133/52 97.6 °F (36.4 °C) 64 14 100 % --   02/26/22 0600 (!) 126/51 -- 63 15 100 % --   02/26/22 0500 (!) 119/48 -- 62 13 100 % --   02/26/22 0400 (!) 107/39 -- 63 12 99 % --       02/26 0701 - 02/26 1900  In: 38387   Out: 22014 [Urine:87316]  02/24 1901 - 02/26 0700  In: 63491 [I.V.:2706.7]  Out: 16692 [KIP:60344]    Lab Review   Recent Results (from the past 24 hour(s))   LACTIC ACID    Collection Time: 02/25/22 12:30 PM   Result Value Ref Range    Lactic acid 3.0 (HH) 0.4 - 2.0 mmol/L   CK    Collection Time: 02/25/22 12:30 PM   Result Value Ref Range     (H) 26 - 192 U/L   CULTURE, BLOOD    Collection Time: 02/25/22 12:30 PM    Specimen: Blood   Result Value Ref Range    Special Requests: No Special Requests      Culture result: No growth after 18 hours     RENAL FUNCTION PANEL    Collection Time: 02/25/22 12:30 PM   Result Value Ref Range    Sodium 141 136 - 145 mmol/L    Potassium 5.1 3.5 - 5.1 mmol/L    Chloride 111 (H) 97 - 108 mmol/L    CO2 20 (L) 21 - 32 mmol/L    Anion gap 10 5 - 15 mmol/L    Glucose 130 (H) 65 - 100 mg/dL    BUN 64 (H) 6 - 20 mg/dL    Creatinine 2.90 (H) 0.55 - 1.02 mg/dL    BUN/Creatinine ratio 22 (H) 12 - 20      GFR est AA 19 (L) >60 ml/min/1.73m2    GFR est non-AA 16 (L) >60 ml/min/1.73m2    Calcium 8.7 8.5 - 10.1 mg/dL    Phosphorus 5.3 (H) 2.6 - 4.7 mg/dL    Albumin 2.1 (L) 3.5 - 5.0 g/dL   GLUCOSE, POC Collection Time: 02/25/22 12:55 PM   Result Value Ref Range    Glucose (POC) 153 (H) 65 - 117 mg/dL    Performed by Vishnu Washington    RENAL FUNCTION PANEL    Collection Time: 02/25/22  5:28 PM   Result Value Ref Range    Sodium 142 136 - 145 mmol/L    Potassium 5.6 (H) 3.5 - 5.1 mmol/L    Chloride 109 (H) 97 - 108 mmol/L    CO2 21 21 - 32 mmol/L    Anion gap 12 5 - 15 mmol/L    Glucose 247 (H) 65 - 100 mg/dL    BUN 68 (H) 6 - 20 mg/dL    Creatinine 3.18 (H) 0.55 - 1.02 mg/dL    BUN/Creatinine ratio 21 (H) 12 - 20      GFR est AA 17 (L) >60 ml/min/1.73m2    GFR est non-AA 14 (L) >60 ml/min/1.73m2    Calcium 8.2 (L) 8.5 - 10.1 mg/dL    Phosphorus 6.1 (H) 2.6 - 4.7 mg/dL    Albumin 2.0 (L) 3.5 - 5.0 g/dL   CBC WITH AUTOMATED DIFF    Collection Time: 02/25/22  5:28 PM   Result Value Ref Range    WBC 24.8 (H) 3.6 - 11.0 K/uL    RBC 2.38 (L) 3.80 - 5.20 M/uL    HGB 6.9 (L) 11.5 - 16.0 g/dL    HCT 21.1 (L) 35.0 - 47.0 %    MCV 88.7 80.0 - 99.0 FL    MCH 29.0 26.0 - 34.0 PG    MCHC 32.7 30.0 - 36.5 g/dL    RDW 17.2 (H) 11.5 - 14.5 %    PLATELET 556 182 - 038 K/uL    MPV 10.8 8.9 - 12.9 FL    NRBC 0.1 (H) 0.0  WBC    ABSOLUTE NRBC 0.03 (H) 0.00 - 0.01 K/uL    NEUTROPHILS 87 (H) 32 - 75 %    BAND NEUTROPHILS 6 0 - 6 %    LYMPHOCYTES 6 (L) 12 - 49 %    MONOCYTES 1 (L) 5 - 13 %    EOSINOPHILS 0 0 - 7 %    BASOPHILS 0 0 - 1 %    IMMATURE GRANULOCYTES 0 %    ABS. NEUTROPHILS 23.1 (H) 1.8 - 8.0 K/UL    ABS. LYMPHOCYTES 1.5 0.8 - 3.5 K/UL    ABS. MONOCYTES 0.2 0.0 - 1.0 K/UL    ABS. EOSINOPHILS 0.0 0.0 - 0.4 K/UL    ABS. BASOPHILS 0.0 0.0 - 0.1 K/UL    ABS. IMM.  GRANS. 0.0 K/UL    DF Manual      RBC COMMENTS Hypochromia  1+        RBC COMMENTS Microcytosis  1+       GLUCOSE, POC    Collection Time: 02/25/22  5:33 PM   Result Value Ref Range    Glucose (POC) 260 (H) 65 - 117 mg/dL    Performed by [facility]    Collection Time: 02/25/22  7:00 PM   Result Value Ref Range    Crossmatch Expiration 02/28/2022,9941 ABO/Rh(D) O Positive     Antibody screen Negative     Unit number [id number]     Blood component type RC LR,2     Unit division 00     Status of unit [address] to transfuse     Crossmatch result Compatible    GLUCOSE, POC    Collection Time: 02/25/22 11:37 PM   Result Value Ref Range    Glucose (POC) 314 (H) 65 - 117 mg/dL    Performed by Theodore Moses    CBC WITH AUTOMATED DIFF    Collection Time: 02/26/22  4:00 AM   Result Value Ref Range    WBC 22.3 (H) 3.6 - 11.0 K/uL    RBC 2.58 (L) 3.80 - 5.20 M/uL    HGB 7.5 (L) 11.5 - 16.0 g/dL    HCT 22.8 (L) 35.0 - 47.0 %    MCV 88.4 80.0 - 99.0 FL    MCH 29.1 26.0 - 34.0 PG    MCHC 32.9 30.0 - 36.5 g/dL    RDW 16.2 (H) 11.5 - 14.5 %    PLATELET 439 484 - 208 K/uL    MPV 10.9 8.9 - 12.9 FL    NRBC 0.3 (H) 0.0  WBC    ABSOLUTE NRBC 0.07 (H) 0.00 - 0.01 K/uL    NEUTROPHILS 90 (H) 32 - 75 %    BAND NEUTROPHILS 2 0 - 6 %    LYMPHOCYTES 7 (L) 12 - 49 %    MONOCYTES 1 (L) 5 - 13 %    EOSINOPHILS 0 0 - 7 %    BASOPHILS 0 0 - 1 %    IMMATURE GRANULOCYTES 0 %    ABS. NEUTROPHILS 20.5 (H) 1.8 - 8.0 K/UL    ABS. LYMPHOCYTES 1.6 0.8 - 3.5 K/UL    ABS. MONOCYTES 0.2 0.0 - 1.0 K/UL    ABS. EOSINOPHILS 0.0 0.0 - 0.4 K/UL    ABS. BASOPHILS 0.0 0.0 - 0.1 K/UL    ABS. IMM.  GRANS. 0.0 K/UL    DF Manual      PLATELET COMMENTS Large Platelets      RBC COMMENTS Teardrop cells  1+        RBC COMMENTS Macrocytosis  1+       RENAL FUNCTION PANEL    Collection Time: 02/26/22  4:00 AM   Result Value Ref Range    Sodium 139 136 - 145 mmol/L    Potassium 4.4 3.5 - 5.1 mmol/L    Chloride 106 97 - 108 mmol/L    CO2 26 21 - 32 mmol/L    Anion gap 7 5 - 15 mmol/L    Glucose 224 (H) 65 - 100 mg/dL    BUN 76 (H) 6 - 20 mg/dL    Creatinine 3.09 (H) 0.55 - 1.02 mg/dL    BUN/Creatinine ratio 25 (H) 12 - 20      GFR est AA 18 (L) >60 ml/min/1.73m2    GFR est non-AA 15 (L) >60 ml/min/1.73m2    Calcium 8.1 (L) 8.5 - 10.1 mg/dL    Phosphorus 5.3 (H) 2.6 - 4.7 mg/dL    Albumin 2.0 (L) 3.5 - 5.0 g/dL     Additional comments: MRI of the brain shows right paracentral lobule infarct    NEUROLOGICAL EXAM:  Appearance: The patient is well developed, drowsy and lethargic, but arousable. She says 1 or 2 words but no full conversation. Mental Status: Could not get much answers, per nurse earlier she knew she was in the hospital.   Cranial Nerves:   Intact visual fields. GUMARO, EOM's full, no nystagmus, no ptosis. Facial movement is symmetric. Motor:   Moves all extremities to deep pain may be left side slightly weaker than the right. Reflexes:   Deep tendon reflexes not checked. Sensory:   Normal to touch, pinprick and vibration. Gait:   Cannot be checked. Tremor:   No tremor noted. Cerebellar:   Could not be checked. Neurovascular:  Normal heart sounds and regular rhythm     Assessment:   1. Acute infarction left paracentral lobule and region of the white matter of the left motor cortex: This is the cause of her new right-sided weakness. 2.  Lethargy/encephalopathy: Secondary to Ativan the night before and Norco since. She has diabetic neuropathy which causes neuropathic pain. She is supposed to be taking gabapentin and will increase the dose to 100 mg 3 times daily once she starts waking up. 3.  Hypertension. 4.  Diabetes mellitus  Plan:   1. We will start the gabapentin once she starts becoming more awake at 100 mg 3 times daily. 2.  Please give Ativan 1 mg only if she goes into a grand mal seizure lasting for more than 3 minutes. We do not recommend Ativan for any focal seizure-like activity. Even with a grand mal seizure of the seizure stopped by the time Ativan is ready to be given we hold it.     Thank you,    Signed:  Vipin Hathaway MD  2/26/2022  11:46 AM

## 2022-02-27 NOTE — PROGRESS NOTES
Attempted bedside swallow re-evaluation. Patient continues to not be appropriate; too lethargic/sedated and unable to follow simple commands. ST will continue to follow and re-eval once medically appropriate.

## 2022-02-27 NOTE — PROGRESS NOTES
Call placed to Dr. Delma Gibbs. Pt with increased lethargy. No speech, slow to follow commands, but continues to move all extremities with the same deficits. Orders to continue to watch and notify MD of any changes.

## 2022-02-27 NOTE — PROGRESS NOTES
Neurology Progress Note    Patient ID:  Amos Luna  [id number]  61 y.o.  1945    Subjective: The patient is seen in follow-up this morning for question of seizure after stroke she is lethargic though arousable, not much change from yesterday. No sedation since she was given 2 mg of Ativan the night before and also Norco tablet for her neuropathic pain in the legs due to diabetic neuropathy. Patient is a 78year old woman with HTN, DM who had a stroke last week with right facial droop and right sided weakness worked up in [facility] who was brought in for worsening speech and left sided weakness per daughters at bedside. She is living at home and uses a walker to ambulate at baseline. Ct head done yesterday shows old basal ganglia stroke. Her BP are elevated to 942U systolic.     Current Facility-Administered Medications   Medication Dose Route Frequency    0.9% sodium chloride infusion 250 mL  250 mL IntraVENous PRN    gabapentin (NEURONTIN) capsule 100 mg  100 mg Oral DAILY    0.9% sodium chloride infusion  50 mL/hr IntraVENous CONTINUOUS    0.9% sodium chloride infusion 250 mL  250 mL IntraVENous PRN    zinc oxide-white petrolatum 20-75 % topical paste   Topical BID    cefTRIAXone (ROCEPHIN) 1 g in sterile water (preservative free) 10 mL IV syringe  1 g IntraVENous Q24H    dicyclomine (BENTYL) capsule 10 mg  10 mg Oral TID    lidocaine (XYLOCAINE) 4 % (40 mg/mL) topical solution   Topical PRN    [Held by provider] rivaroxaban (XARELTO) tablet 2.5 mg  2.5 mg Oral BID WITH MEALS    atorvastatin (LIPITOR) tablet 80 mg  80 mg Oral DAILY    labetaloL (NORMODYNE;TRANDATE) 20 mg/4 mL (5 mg/mL) injection 10 mg  10 mg IntraVENous Q6H PRN    sodium chloride (NS) flush 5-40 mL  5-40 mL IntraVENous Q8H    sodium chloride (NS) flush 5-40 mL  5-40 mL IntraVENous PRN    brimonidine (ALPHAGAN) 0.2 % ophthalmic solution 1 Drop  1 Drop Both Eyes TID    ferrous sulfate tablet 325 mg  325 mg Oral BID    insulin lispro (HUMALOG) injection   SubCUTAneous Q6H    glucose chewable tablet 16 g  4 Tablet Oral PRN    glucagon (GLUCAGEN) injection 1 mg  1 mg IntraMUSCular PRN    dextrose 10% infusion 125-250 mL  125-250 mL IntraVENous PRN    metoprolol succinate (TOPROL-XL) XL tablet 50 mg  50 mg Oral DAILY    amLODIPine (NORVASC) tablet 10 mg  10 mg Oral DAILY     Objective:     Patient Vitals for the past 8 hrs:   BP Temp Pulse Resp SpO2   02/27/22 0924 (!) 157/49 -- 75 -- --   02/27/22 0900 (!) 157/49 -- 75 20 100 %   02/27/22 0800 (!) 157/54 -- 75 22 100 %   02/27/22 0700 (!) 140/50 98.8 °F (37.1 °C) 78 23 100 %   02/27/22 0659 -- -- -- -- 100 %   02/27/22 0600 (!) 164/53 -- 78 19 100 %   02/27/22 0500 (!) 146/49 -- 80 20 99 %   02/27/22 0400 (!) 122/47 -- 74 19 100 %   02/27/22 0300 (!) 129/42 99.9 °F (37.7 °C) 75 18 100 %   02/27/22 0200 (!) 133/48 -- 75 19 100 %     02/27 0701 - 02/27 1900  In: 6000   Out: 2300 [Urine:2300]  02/25 1901 - 02/27 0700  In: 80449.6 [I.V.:2650]  Out: 46946 [Urine:42466]    Lab Review   Recent Results (from the past 24 hour(s))   GLUCOSE, POC    Collection Time: 02/26/22 12:27 PM   Result Value Ref Range    Glucose (POC) 267 (H) 65 - 117 mg/dL    Performed by Shabana Mckeon    GLUCOSE, POC    Collection Time: 02/26/22  5:40 PM   Result Value Ref Range    Glucose (POC) 248 (H) 65 - 117 mg/dL    Performed by Terry Murdock    GLUCOSE, POC    Collection Time: 02/26/22 11:31 PM   Result Value Ref Range    Glucose (POC) 310 (H) 65 - 117 mg/dL    Performed by Marianna Munoz    GLUCOSE, POC    Collection Time: 02/27/22  4:47 AM   Result Value Ref Range    Glucose (POC) 344 (H) 65 - 117 mg/dL    Performed by Marianna Munoz    CBC WITH AUTOMATED DIFF    Collection Time: 02/27/22  5:25 AM   Result Value Ref Range    WBC 22.5 (H) 3.6 - 11.0 K/uL    RBC 2.26 (L) 3.80 - 5.20 M/uL    HGB 6.7 (L) 11.5 - 16.0 g/dL    HCT 19.7 (L) 35.0 - 47.0 %    MCV 87.2 80.0 - 99.0 FL    MCH 29.6 26.0 - 34.0 PG MCHC 34.0 30.0 - 36.5 g/dL    RDW 16.2 (H) 11.5 - 14.5 %    PLATELET 561 623 - 027 K/uL    MPV 11.2 8.9 - 12.9 FL    NRBC 1.6 (H) 0.0  WBC    ABSOLUTE NRBC 0.36 (H) 0.00 - 0.01 K/uL    NEUTROPHILS 90 (H) 32 - 75 %    LYMPHOCYTES 8 (L) 12 - 49 %    MONOCYTES 1 (L) 5 - 13 %    EOSINOPHILS 0 0 - 7 %    BASOPHILS 0 0 - 1 %    IMMATURE GRANULOCYTES 1 (H) 0 - 0.5 %    ABS. NEUTROPHILS 20.2 (H) 1.8 - 8.0 K/UL    ABS. LYMPHOCYTES 1.8 0.8 - 3.5 K/UL    ABS. MONOCYTES 0.2 0.0 - 1.0 K/UL    ABS. EOSINOPHILS 0.1 0.0 - 0.4 K/UL    ABS. BASOPHILS 0.0 0.0 - 0.1 K/UL    ABS. IMM. GRANS. 0.2 (H) 0.00 - 0.04 K/UL    DF AUTOMATED     METABOLIC PANEL, COMPREHENSIVE    Collection Time: 02/27/22  5:25 AM   Result Value Ref Range    Sodium 139 136 - 145 mmol/L    Potassium 3.9 3.5 - 5.1 mmol/L    Chloride 106 97 - 108 mmol/L    CO2 27 21 - 32 mmol/L    Anion gap 6 5 - 15 mmol/L    Glucose 334 (H) 65 - 100 mg/dL    BUN 78 (H) 6 - 20 mg/dL    Creatinine 2.61 (H) 0.55 - 1.02 mg/dL    BUN/Creatinine ratio 30 (H) 12 - 20      GFR est AA 22 (L) >60 ml/min/1.73m2    GFR est non-AA 18 (L) >60 ml/min/1.73m2    Calcium 8.0 (L) 8.5 - 10.1 mg/dL    Bilirubin, total 0.4 0.2 - 1.0 mg/dL    AST (SGOT) 1,268 (H) 15 - 37 U/L    ALT (SGPT) 2,657 (H) 12 - 78 U/L    Alk. phosphatase 152 (H) 45 - 117 U/L    Protein, total 6.1 (L) 6.4 - 8.2 g/dL    Albumin 1.7 (L) 3.5 - 5.0 g/dL    Globulin 4.4 (H) 2.0 - 4.0 g/dL    A-G Ratio 0.4 (L) 1.1 - 2.2     PHOSPHORUS    Collection Time: 02/27/22  5:25 AM   Result Value Ref Range    Phosphorus 3.3 2.6 - 4.7 mg/dL     Additional comments: MRI of the brain shows right paracentral lobule infarct    NEUROLOGICAL EXAM:  Appearance: The patient is well developed, drowsy and lethargic, but arousable. She says 1 or 2 words but no full conversation. Mental Status: Could not get much answers, per nurse earlier she knew she was in the hospital.   Cranial Nerves:   Intact visual fields.   GUMARO, EOM's full, no nystagmus, no ptosis. Facial movement is symmetric. Motor:   Moves all extremities to deep pain may be left side slightly weaker than the right. Reflexes:   Deep tendon reflexes not checked. Sensory:   Normal to touch, pinprick and vibration. Gait:   Cannot be checked. Tremor:   No tremor noted. Cerebellar:   Could not be checked. Neurovascular:  Normal heart sounds and regular rhythm     Assessment:   1. Acute infarction left paracentral lobule and region of the white matter of the left motor cortex: This is the cause of her new right-sided weakness. 2.  Lethargy/encephalopathy: Secondary to Ativan the 2 nights before and Norco since. She has diabetic neuropathy which causes neuropathic pain. She is supposed to be taking gabapentin and will increase the dose to 100 mg 3 times daily once she starts waking up. 3.  Hypertension. 4.  Diabetes mellitus  Plan:   1.  Gabapentin is on hold for now. Once she starts becoming more awake we'll start at 100 mg 3 times daily. 2.  Please give Ativan 1 mg only if she goes into a grand mal seizure lasting for more than 3 minutes.     Thank you,    Signed:  Silvia Morales MD  2/27/2022  11:46 AM

## 2022-02-27 NOTE — PROGRESS NOTES
General Daily Progress Note          Patient Name:   Kyleigh Conway       YOB: 1945       Age:  78 y.o. Admit Date: 2/21/2022      Subjective:         Patient resting in the bed not in any distress    Getting CBI    NG tube in place    Elevated LFTs    Right arm swelling    Hemoglobin drop             Objective:     Visit Vitals  BP (!) 145/46 (BP 1 Location: Left upper arm, BP Patient Position: At rest)   Pulse 68   Temp 98.7 °F (37.1 °C)   Resp 16   Ht 5' 7\" (1.702 m)   Wt 81.5 kg (179 lb 10.8 oz)   SpO2 100%   BMI 28.14 kg/m²        Recent Results (from the past 24 hour(s))   GLUCOSE, POC    Collection Time: 02/26/22 12:27 PM   Result Value Ref Range    Glucose (POC) 267 (H) 65 - 117 mg/dL    Performed by Patel Rodriguez    GLUCOSE, POC    Collection Time: 02/26/22  5:40 PM   Result Value Ref Range    Glucose (POC) 248 (H) 65 - 117 mg/dL    Performed by Terry Murdock    GLUCOSE, POC    Collection Time: 02/26/22 11:31 PM   Result Value Ref Range    Glucose (POC) 310 (H) 65 - 117 mg/dL    Performed by Laney Mayer    GLUCOSE, POC    Collection Time: 02/27/22  4:47 AM   Result Value Ref Range    Glucose (POC) 344 (H) 65 - 117 mg/dL    Performed by Laney Mayer    CBC WITH AUTOMATED DIFF    Collection Time: 02/27/22  5:25 AM   Result Value Ref Range    WBC 22.5 (H) 3.6 - 11.0 K/uL    RBC 2.26 (L) 3.80 - 5.20 M/uL    HGB 6.7 (L) 11.5 - 16.0 g/dL    HCT 19.7 (L) 35.0 - 47.0 %    MCV 87.2 80.0 - 99.0 FL    MCH 29.6 26.0 - 34.0 PG    MCHC 34.0 30.0 - 36.5 g/dL    RDW 16.2 (H) 11.5 - 14.5 %    PLATELET 763 396 - 354 K/uL    MPV 11.2 8.9 - 12.9 FL    NRBC 1.6 (H) 0.0  WBC    ABSOLUTE NRBC 0.36 (H) 0.00 - 0.01 K/uL    NEUTROPHILS 90 (H) 32 - 75 %    LYMPHOCYTES 8 (L) 12 - 49 %    MONOCYTES 1 (L) 5 - 13 %    EOSINOPHILS 0 0 - 7 %    BASOPHILS 0 0 - 1 %    IMMATURE GRANULOCYTES 1 (H) 0 - 0.5 %    ABS. NEUTROPHILS 20.2 (H) 1.8 - 8.0 K/UL    ABS. LYMPHOCYTES 1.8 0.8 - 3.5 K/UL    ABS.  MONOCYTES 0.2 0.0 - 1.0 K/UL    ABS. EOSINOPHILS 0.1 0.0 - 0.4 K/UL    ABS. BASOPHILS 0.0 0.0 - 0.1 K/UL    ABS. IMM. GRANS. 0.2 (H) 0.00 - 0.04 K/UL    DF AUTOMATED     METABOLIC PANEL, COMPREHENSIVE    Collection Time: 02/27/22  5:25 AM   Result Value Ref Range    Sodium 139 136 - 145 mmol/L    Potassium 3.9 3.5 - 5.1 mmol/L    Chloride 106 97 - 108 mmol/L    CO2 27 21 - 32 mmol/L    Anion gap 6 5 - 15 mmol/L    Glucose 334 (H) 65 - 100 mg/dL    BUN 78 (H) 6 - 20 mg/dL    Creatinine 2.61 (H) 0.55 - 1.02 mg/dL    BUN/Creatinine ratio 30 (H) 12 - 20      GFR est AA 22 (L) >60 ml/min/1.73m2    GFR est non-AA 18 (L) >60 ml/min/1.73m2    Calcium 8.0 (L) 8.5 - 10.1 mg/dL    Bilirubin, total 0.4 0.2 - 1.0 mg/dL    AST (SGOT) 1,268 (H) 15 - 37 U/L    ALT (SGPT) 2,657 (H) 12 - 78 U/L    Alk. phosphatase 152 (H) 45 - 117 U/L    Protein, total 6.1 (L) 6.4 - 8.2 g/dL    Albumin 1.7 (L) 3.5 - 5.0 g/dL    Globulin 4.4 (H) 2.0 - 4.0 g/dL    A-G Ratio 0.4 (L) 1.1 - 2.2     PHOSPHORUS    Collection Time: 02/27/22  5:25 AM   Result Value Ref Range    Phosphorus 3.3 2.6 - 4.7 mg/dL   GLUCOSE, POC    Collection Time: 02/27/22 10:35 AM   Result Value Ref Range    Glucose (POC) 250 (H) 65 - 117 mg/dL    Performed by Terry Murdock      [unfilled]      Review of Systems    Constitutional: Negative for chills and fever. HENT: Negative. Eyes: Negative. Respiratory: Negative. Cardiovascular: Negative. Gastrointestinal: Negative for abdominal pain and nausea. Skin: Negative. Neurological: Negative. Physical Exam:      Constitutional: pt is oriented to person, place, and time. HENT:   Head: Normocephalic and atraumatic. Eyes: Pupils are equal, round, and reactive to light. EOM are normal.   Cardiovascular: Normal rate, regular rhythm and normal heart sounds. Pulmonary/Chest: Breath sounds normal. No wheezes. No rales. Exhibits no tenderness. Abdominal: Soft.  Bowel sounds are normal. There is no abdominal tenderness. There is no rebound and no guarding. Musculoskeletal: Normal range of motion. Neurological: pt is alert and oriented to person, place, and time. XR CHEST PORT   Final Result   The cardiomediastinal silhouette is appropriate for age, technique,   and lung expansion. Pulmonary vasculature is not congested. The lungs are   essentially clear. No effusion or pneumothorax is seen. CT HEAD WO CONT   Final Result   Negative for acute intracranial process. CT ABD PELV WO CONT   Final Result   Hemorrhage within the urinary bladder. Mild left   hydroureteronephrosis. XR SWALLOW FUNC VIDEO   Final Result   Penetration with thin consistency. No aspiration. CTA HEAD NECK W WO CONT   Final Result   Negative head and neck CTA. Please note that all carotid bifurcation stenoses are measured as per NASCET   criteria. CT CODE NEURO HEAD WO CONTRAST   Final Result   1. No acute large vessel intracranial ischemia, hemorrhage. Called report to   patient's nurse Tori Sykes at 7:12 PM 2/22/2022.   2. Right basal ganglia lacunar infarct. 3. Periventricular microangiopathy. 4. Pansinusitis. See above. MRI BRAIN WO CONT   Final Result   Acute infarction left paracentral lobule and region of the white   matter of the left motor cortex. CT CODE NEURO HEAD WO CONTRAST   Final Result      1. No acute intracranial hemorrhage. 2. Left basal ganglia lacunar infarct, which is age indeterminate in the absence   of any prior outside studies, but favored to be chronic. 3. Moderate to severe paranasal sinus disease.       Results called to Dr. Elza Schmidt at 9:39 PM on 2/21/2022      US ABD COMP    (Results Pending)        Recent Results (from the past 24 hour(s))   GLUCOSE, POC    Collection Time: 02/26/22 12:27 PM   Result Value Ref Range    Glucose (POC) 267 (H) 65 - 117 mg/dL    Performed by Cj Beth    GLUCOSE, POC    Collection Time: 02/26/22  5:40 PM   Result Value Ref Range Glucose (POC) 248 (H) 65 - 117 mg/dL    Performed by Terry Murdock    GLUCOSE, POC    Collection Time: 02/26/22 11:31 PM   Result Value Ref Range    Glucose (POC) 310 (H) 65 - 117 mg/dL    Performed by Felicia Acosta    GLUCOSE, POC    Collection Time: 02/27/22  4:47 AM   Result Value Ref Range    Glucose (POC) 344 (H) 65 - 117 mg/dL    Performed by Felicia Acosta    CBC WITH AUTOMATED DIFF    Collection Time: 02/27/22  5:25 AM   Result Value Ref Range    WBC 22.5 (H) 3.6 - 11.0 K/uL    RBC 2.26 (L) 3.80 - 5.20 M/uL    HGB 6.7 (L) 11.5 - 16.0 g/dL    HCT 19.7 (L) 35.0 - 47.0 %    MCV 87.2 80.0 - 99.0 FL    MCH 29.6 26.0 - 34.0 PG    MCHC 34.0 30.0 - 36.5 g/dL    RDW 16.2 (H) 11.5 - 14.5 %    PLATELET 775 406 - 524 K/uL    MPV 11.2 8.9 - 12.9 FL    NRBC 1.6 (H) 0.0  WBC    ABSOLUTE NRBC 0.36 (H) 0.00 - 0.01 K/uL    NEUTROPHILS 90 (H) 32 - 75 %    LYMPHOCYTES 8 (L) 12 - 49 %    MONOCYTES 1 (L) 5 - 13 %    EOSINOPHILS 0 0 - 7 %    BASOPHILS 0 0 - 1 %    IMMATURE GRANULOCYTES 1 (H) 0 - 0.5 %    ABS. NEUTROPHILS 20.2 (H) 1.8 - 8.0 K/UL    ABS. LYMPHOCYTES 1.8 0.8 - 3.5 K/UL    ABS. MONOCYTES 0.2 0.0 - 1.0 K/UL    ABS. EOSINOPHILS 0.1 0.0 - 0.4 K/UL    ABS. BASOPHILS 0.0 0.0 - 0.1 K/UL    ABS. IMM. GRANS. 0.2 (H) 0.00 - 0.04 K/UL    DF AUTOMATED     METABOLIC PANEL, COMPREHENSIVE    Collection Time: 02/27/22  5:25 AM   Result Value Ref Range    Sodium 139 136 - 145 mmol/L    Potassium 3.9 3.5 - 5.1 mmol/L    Chloride 106 97 - 108 mmol/L    CO2 27 21 - 32 mmol/L    Anion gap 6 5 - 15 mmol/L    Glucose 334 (H) 65 - 100 mg/dL    BUN 78 (H) 6 - 20 mg/dL    Creatinine 2.61 (H) 0.55 - 1.02 mg/dL    BUN/Creatinine ratio 30 (H) 12 - 20      GFR est AA 22 (L) >60 ml/min/1.73m2    GFR est non-AA 18 (L) >60 ml/min/1.73m2    Calcium 8.0 (L) 8.5 - 10.1 mg/dL    Bilirubin, total 0.4 0.2 - 1.0 mg/dL    AST (SGOT) 1,268 (H) 15 - 37 U/L    ALT (SGPT) 2,657 (H) 12 - 78 U/L    Alk.  phosphatase 152 (H) 45 - 117 U/L    Protein, total 6.1 (L) 6.4 - 8.2 g/dL    Albumin 1.7 (L) 3.5 - 5.0 g/dL    Globulin 4.4 (H) 2.0 - 4.0 g/dL    A-G Ratio 0.4 (L) 1.1 - 2.2     PHOSPHORUS    Collection Time: 02/27/22  5:25 AM   Result Value Ref Range    Phosphorus 3.3 2.6 - 4.7 mg/dL   GLUCOSE, POC    Collection Time: 02/27/22 10:35 AM   Result Value Ref Range    Glucose (POC) 250 (H) 65 - 117 mg/dL    Performed by [facility]        Results     Procedure Component Value Units Date/Time    CULTURE, BLOOD [[id number]] Collected: 02/25/22 1230    Order Status: Completed Specimen: Blood Updated: 02/27/22 0906     Special Requests: No Special Requests        Culture result: No growth 2 days       CULTURE, URINE [[id number]]  (Abnormal)  (Susceptibility) Collected: 02/23/22 1100    Order Status: Completed Specimen: Urine Updated: 02/26/22 1133     Special Requests: No Special Requests        Brady Count --        >100,000  colonies/ml       Culture result: Escherichia coli       Susceptibility      Escherichia coli     GEMA     Amikacin ($) Susceptible     Ampicillin ($) Resistant     Ampicillin/sulbactam ($) Resistant     Cefazolin ($) Susceptible     Cefepime ($$) Susceptible     Cefoxitin Susceptible     Ceftazidime ($) Susceptible     Ceftriaxone ($) Susceptible     Ciprofloxacin ($) Susceptible     Gentamicin ($) Resistant     Levofloxacin ($) Susceptible     Meropenem ($$) Susceptible     Nitrofurantoin Susceptible     Piperacillin/Tazobac ($) Susceptible     Tobramycin ($) Intermediate     Trimeth/Sulfa Resistant                  Linear View                          Labs:     Recent Labs     02/27/22  0525 02/26/22  0400   WBC 22.5* 22.3*   HGB 6.7* 7.5*   HCT 19.7* 22.8*    350     Recent Labs     02/27/22  0525 02/26/22  0400 02/25/22  1728    139 142   K 3.9 4.4 5.6*    106 109*   CO2 27 26 21   BUN 78* 76* 68*   CREA 2.61* 3.09* 3.18*   * 224* 247*   CA 8.0* 8.1* 8.2*   PHOS 3.3 5.3* 6.1*     Recent Labs     02/27/22  0525 02/26/22  0400 02/25/22  1728   ALT 2,657*  --   --    *  --   --    TBILI 0.4  --   --    TP 6.1*  --   --    ALB 1.7* 2.0* 2.0*   GLOB 4.4*  --   --      No results for input(s): INR, PTP, APTT, INREXT, INREXT in the last 72 hours. No results for input(s): FE, TIBC, PSAT, FERR in the last 72 hours. No results found for: FOL, RBCF   No results for input(s): PH, PCO2, PO2 in the last 72 hours. Recent Labs     02/25/22  1230   *     Lab Results   Component Value Date/Time    Cholesterol, total 213 (H) 09/25/2019 02:22 PM    HDL Cholesterol 39 (L) 09/25/2019 02:22 PM    LDL,Direct 79 06/28/2021 12:00 AM    LDL, calculated 135 (H) 09/25/2019 02:22 PM    Triglyceride 194 (H) 09/25/2019 02:22 PM     Lab Results   Component Value Date/Time    Glucose (POC) 250 (H) 02/27/2022 10:35 AM    Glucose (POC) 344 (H) 02/27/2022 04:47 AM    Glucose (POC) 310 (H) 02/26/2022 11:31 PM    Glucose (POC) 248 (H) 02/26/2022 05:40 PM    Glucose (POC) 267 (H) 02/26/2022 12:27 PM     No results found for: COLOR, APPRN, SPGRU, REFSG, ARIEL, PROTU, GLUCU, KETU, BILU, UROU, VICTORIANO, LEUKU, GLUKE, EPSU, BACTU, WBCU, RBCU, CASTS, UCRY      Assessment:     Acute kidney injury on chronic kidney injury stage IIIb  Metabolic acidosis  Anemia of chronic disease  Acute blood loss anemia  Complicated UTI  Type 2 diabetes  Neuropathy  History of CVA  Hemorrhagic UTI  Leukocytosis  Questionable seizures  Hypertension  UTI with E. Coli  Hepatitis  Anemia  Swelling of the right leg    Plan:      On amlodipine 10 mg daily  Lipitor 80 mg daily  hold  Rocephin 1 g every 24 hours  Ferrous sulfate 325 mg twice a day  Gabapentin 100 mg daily  Sliding-scale insulin  Metoprolol 50 mg daily    Continue current treatment    Follow-up with urology regarding CBI  Follow-up the cultures    Transfuse 1 unit packed RBC  Order hepatitis profile  Ultrasound of the right upper quadrant for elevated LFT repeat the labs    Doppler studies of the right arm    Start on tube feeding        Current Facility-Administered Medications:     0.9% sodium chloride infusion 250 mL, 250 mL, IntraVENous, PRN, Deborah Soto MD    insulin lispro (HUMALOG) injection, , SubCUTAneous, Q4H, Israel Soto MD    latanoprost (XALATAN) 0.005 % ophthalmic solution 1 Drop, 1 Drop, Right Eye, QPM, Deborah Soto MD    gabapentin (NEURONTIN) capsule 100 mg, 100 mg, Oral, DAILY, Orestes Norman MD, 100 mg at 02/25/22 0112    0.9% sodium chloride infusion, 50 mL/hr, IntraVENous, CONTINUOUS, Sakshi Louise MD, Paused at 02/27/22 1045    0.9% sodium chloride infusion 250 mL, 250 mL, IntraVENous, PRN, Sakshi Louise MD    zinc oxide-white petrolatum 20-75 % topical paste, , Topical, BID, Orestes Norman MD, Given at 02/27/22 0900    cefTRIAXone (ROCEPHIN) 1 g in sterile water (preservative free) 10 mL IV syringe, 1 g, IntraVENous, Q24H, Orestes Norman MD, 1 g at 02/26/22 1735    lidocaine (XYLOCAINE) 4 % (40 mg/mL) topical solution, , Topical, PRN, Mlcristal JACKSON MD, Given at 02/26/22 2207    [Held by provider] rivaroxaban (XARELTO) tablet 2.5 mg, 2.5 mg, Oral, BID WITH MEALS, Mary Anne Samaniego MD, 2.5 mg at 02/24/22 1720    [Held by provider] atorvastatin (LIPITOR) tablet 80 mg, 80 mg, Oral, DAILY, Mary Anne Samaniego MD, 80 mg at 02/26/22 0900    labetaloL (NORMODYNE;TRANDATE) 20 mg/4 mL (5 mg/mL) injection 10 mg, 10 mg, IntraVENous, Q6H PRN, Mary Anne Samaniego MD    sodium chloride (NS) flush 5-40 mL, 5-40 mL, IntraVENous, Q8H, Orestes Norman MD, 10 mL at 02/27/22 0621    sodium chloride (NS) flush 5-40 mL, 5-40 mL, IntraVENous, PRN, Orestes Norman MD    brimonidine (ALPHAGAN) 0.2 % ophthalmic solution 1 Drop, 1 Drop, Both Eyes, TID, Orestes Norman MD, 1 Drop at 02/27/22 7925    ferrous sulfate tablet 325 mg, 325 mg, Oral, BID, Mlcristal JACKSON MD, 325 mg at 02/27/22 7617    glucose chewable tablet 16 g, 4 Tablet, Oral, PRN, Pedro Romo MD    glucagon (GLUCAGEN) injection 1 mg, 1 mg, IntraMUSCular, PRN, Orestes Lockett MD    dextrose 10% infusion 125-250 mL, 125-250 mL, IntraVENous, PRN, Orestes Lockett MD    metoprolol succinate (TOPROL-XL) XL tablet 50 mg, 50 mg, Oral, DAILY, Myron Alegria MD, 50 mg at 02/24/22 0988    amLODIPine (NORVASC) tablet 10 mg, 10 mg, Oral, DAILY, Myron Alegria MD, 10 mg at 02/24/22 3830

## 2022-02-27 NOTE — CONSULTS
Consult    Patient: Arik Shah MRN: [id number]  SSN: [id number]    YOB: 1945  Age: 78 y.o. Sex: female      Subjective:      Arik Shah is a 78 y.o. female who is being seen for elevated transaminase.   Most of her history is from medical records  Patient was here last couple days for the new onset stroke, has been treated for the renal sufficiency, UTI, urosepsis, patient had a seizure activity yesterday, has been followed by neurologist, today blood test indicated significant elevated transaminase, GI consultation placed, there was no documented this jaundice, abdominal pain, nausea vomiting, no GI bleeding, although patient is always have anemia,  Past Medical History:   Diagnosis Date    Diabetes mellitus ([facility] [address]     HTN (hypertension)     Hyperlipidemia     Neuropathy     PAD (peripheral artery disease) ([facility] [address]     PCI    Sciatica      Past Surgical History:   Procedure Laterality Date    HX AMPUTATION Right     great toe    HX CERVICAL FUSION      HX OTHER SURGICAL Bilateral     Stent placement    HX OTHER SURGICAL      HX VASCULAR STENT Bilateral     2 in right 1 in left    HX VASCULAR STENT Bilateral       Family History   Problem Relation Age of Onset    Cancer Mother     Diabetes Mother     Hypertension Mother      Social History     Tobacco Use    Smoking status: Never Smoker    Smokeless tobacco: Never Used   Substance Use Topics    Alcohol use: Not Currently      Current Facility-Administered Medications   Medication Dose Route Frequency Provider Last Rate Last Admin    0.9% sodium chloride infusion 250 mL  250 mL IntraVENous PRN Israel Soto MD        insulin lispro (HUMALOG) injection   SubCUTAneous Q4H Israel Soto MD   9 Units at 02/27/22 1147    latanoprost (XALATAN) 0.005 % ophthalmic solution 1 Drop  1 Drop Right Eye QPM Israel Soto MD        gabapentin (NEURONTIN) capsule 100 mg  100 mg Oral DAILY Imani JACKSON MD   100 mg at 02/25/22 0112    0.9% sodium chloride infusion  50 mL/hr IntraVENous CONTINUOUS Pricila Louise MD   Paused at 02/27/22 1045    0.9% sodium chloride infusion 250 mL  250 mL IntraVENous PRN Pricila Louies MD        zinc oxide-white petrolatum 20-75 % topical paste   Topical BID James Reeves MD   Given at 02/27/22 0900    cefTRIAXone (ROCEPHIN) 1 g in sterile water (preservative free) 10 mL IV syringe  1 g IntraVENous Q24H Coit Overall I, MD   1 g at 02/26/22 1735    lidocaine (XYLOCAINE) 4 % (40 mg/mL) topical solution   Topical PRN Coit Overall MD MANUEL   Given at 02/26/22 2207    [Held by provider] rivaroxaban Sakina Vera) tablet 2.5 mg  2.5 mg Oral BID WITH MEALS Nanette Saldana MD   2.5 mg at 02/24/22 1720    [Held by provider] atorvastatin (LIPITOR) tablet 80 mg  80 mg Oral DAILY Nanette Saldana MD   80 mg at 02/26/22 0900    labetaloL (NORMODYNE;TRANDATE) 20 mg/4 mL (5 mg/mL) injection 10 mg  10 mg IntraVENous Q6H PRN Nanette Saldana MD        sodium chloride (NS) flush 5-40 mL  5-40 mL IntraVENous Q8H Coit Overall MD MANUEL   10 mL at 02/27/22 3499    sodium chloride (NS) flush 5-40 mL  5-40 mL IntraVENous PRN Coit Overall I, MD        brimonidine (ALPHAGAN) 0.2 % ophthalmic solution 1 Drop  1 Drop Both Eyes TID Coit Overall MD MANUEL   1 Drop at 02/27/22 1634    ferrous sulfate tablet 325 mg  325 mg Oral BID Coit Overall MD MANUEL   325 mg at 02/27/22 2262    glucose chewable tablet 16 g  4 Tablet Oral PRN James Reeves MD        glucagon (GLUCAGEN) injection 1 mg  1 mg IntraMUSCular PRN Coit Overall MD MANUEL        dextrose 10% infusion 125-250 mL  125-250 mL IntraVENous PRN Coit Overall MD MANUEL        metoprolol succinate (TOPROL-XL) XL tablet 50 mg  50 mg Oral DAILY Wendee Runner, MD   50 mg at 02/24/22 0911    amLODIPine (NORVASC) tablet 10 mg  10 mg Oral DAILY Wendee Runner, MD   10 mg at 02/24/22 2081        No Known Allergies    Review of Systems:  Review of Systems   Unable to perform ROS: Medical condition        Objective:     Vitals:    02/27/22 1043 02/27/22 1044 02/27/22 1100 02/27/22 1115   BP: (!) 144/44 (!) 144/44 (!) 145/46 (!) 148/45   Pulse: 68 68 68 70   Resp: 17 17 16 12   Temp: 99 °F (37.2 °C) 99 °F (37.2 °C) 98.7 °F (37.1 °C) 98.7 °F (37.1 °C)   SpO2: 100% 100% 100%    Weight:       Height:            Physical Exam:  Physical Exam  Constitutional:       Appearance: She is ill-appearing. HENT:      Head: Atraumatic. Mouth/Throat:      Pharynx: No posterior oropharyngeal erythema. Eyes:      General: No scleral icterus. Cardiovascular:      Rate and Rhythm: Rhythm irregular. Pulses: Normal pulses. Pulmonary:      Effort: Pulmonary effort is normal.   Abdominal:      General: Abdomen is flat. Bowel sounds are normal.      Palpations: Abdomen is soft. Musculoskeletal:      Cervical back: Neck supple. Skin:     General: Skin is dry. Neurological:      Mental Status: Mental status is at baseline.           Recent Results (from the past 24 hour(s))   GLUCOSE, POC    Collection Time: 02/26/22  5:40 PM   Result Value Ref Range    Glucose (POC) 248 (H) 65 - 117 mg/dL    Performed by Terry Murdock    GLUCOSE, POC    Collection Time: 02/26/22 11:31 PM   Result Value Ref Range    Glucose (POC) 310 (H) 65 - 117 mg/dL    Performed by Jerri Perez    GLUCOSE, POC    Collection Time: 02/27/22  4:47 AM   Result Value Ref Range    Glucose (POC) 344 (H) 65 - 117 mg/dL    Performed by Jerri Perez    CBC WITH AUTOMATED DIFF    Collection Time: 02/27/22  5:25 AM   Result Value Ref Range    WBC 22.5 (H) 3.6 - 11.0 K/uL    RBC 2.26 (L) 3.80 - 5.20 M/uL    HGB 6.7 (L) 11.5 - 16.0 g/dL    HCT 19.7 (L) 35.0 - 47.0 %    MCV 87.2 80.0 - 99.0 FL    MCH 29.6 26.0 - 34.0 PG    MCHC 34.0 30.0 - 36.5 g/dL    RDW 16.2 (H) 11.5 - 14.5 %    PLATELET 864 618 - 844 K/uL    MPV 11.2 8.9 - 12.9 FL    NRBC 1.6 (H) 0.0  WBC    ABSOLUTE NRBC 0.36 (H) 0.00 - 0.01 K/uL NEUTROPHILS 90 (H) 32 - 75 %    LYMPHOCYTES 8 (L) 12 - 49 %    MONOCYTES 1 (L) 5 - 13 %    EOSINOPHILS 0 0 - 7 %    BASOPHILS 0 0 - 1 %    IMMATURE GRANULOCYTES 1 (H) 0 - 0.5 %    ABS. NEUTROPHILS 20.2 (H) 1.8 - 8.0 K/UL    ABS. LYMPHOCYTES 1.8 0.8 - 3.5 K/UL    ABS. MONOCYTES 0.2 0.0 - 1.0 K/UL    ABS. EOSINOPHILS 0.1 0.0 - 0.4 K/UL    ABS. BASOPHILS 0.0 0.0 - 0.1 K/UL    ABS. IMM. GRANS. 0.2 (H) 0.00 - 0.04 K/UL    DF AUTOMATED     METABOLIC PANEL, COMPREHENSIVE    Collection Time: 02/27/22  5:25 AM   Result Value Ref Range    Sodium 139 136 - 145 mmol/L    Potassium 3.9 3.5 - 5.1 mmol/L    Chloride 106 97 - 108 mmol/L    CO2 27 21 - 32 mmol/L    Anion gap 6 5 - 15 mmol/L    Glucose 334 (H) 65 - 100 mg/dL    BUN 78 (H) 6 - 20 mg/dL    Creatinine 2.61 (H) 0.55 - 1.02 mg/dL    BUN/Creatinine ratio 30 (H) 12 - 20      GFR est AA 22 (L) >60 ml/min/1.73m2    GFR est non-AA 18 (L) >60 ml/min/1.73m2    Calcium 8.0 (L) 8.5 - 10.1 mg/dL    Bilirubin, total 0.4 0.2 - 1.0 mg/dL    AST (SGOT) 1,268 (H) 15 - 37 U/L    ALT (SGPT) 2,657 (H) 12 - 78 U/L    Alk. phosphatase 152 (H) 45 - 117 U/L    Protein, total 6.1 (L) 6.4 - 8.2 g/dL    Albumin 1.7 (L) 3.5 - 5.0 g/dL    Globulin 4.4 (H) 2.0 - 4.0 g/dL    A-G Ratio 0.4 (L) 1.1 - 2.2     PHOSPHORUS    Collection Time: 02/27/22  5:25 AM   Result Value Ref Range    Phosphorus 3.3 2.6 - 4.7 mg/dL   GLUCOSE, POC    Collection Time: 02/27/22 10:35 AM   Result Value Ref Range    Glucose (POC) 250 (H) 65 - 117 mg/dL    Performed by Terry Murdock         XR CHEST PORT   Final Result   The cardiomediastinal silhouette is appropriate for age, technique,   and lung expansion. Pulmonary vasculature is not congested. The lungs are   essentially clear. No effusion or pneumothorax is seen. CT HEAD WO CONT   Final Result   Negative for acute intracranial process. CT ABD PELV WO CONT   Final Result   Hemorrhage within the urinary bladder. Mild left   hydroureteronephrosis.       XR SWALLOW FUNC VIDEO   Final Result   Penetration with thin consistency. No aspiration. CTA HEAD NECK W WO CONT   Final Result   Negative head and neck CTA. Please note that all carotid bifurcation stenoses are measured as per NASCET   criteria. CT CODE NEURO HEAD WO CONTRAST   Final Result   1. No acute large vessel intracranial ischemia, hemorrhage. Called report to   patient's nurse Rojelio Farrell at 7:12 PM 2/22/2022.   2. Right basal ganglia lacunar infarct. 3. Periventricular microangiopathy. 4. Pansinusitis. See above. MRI BRAIN WO CONT   Final Result   Acute infarction left paracentral lobule and region of the white   matter of the left motor cortex. CT CODE NEURO HEAD WO CONTRAST   Final Result      1. No acute intracranial hemorrhage. 2. Left basal ganglia lacunar infarct, which is age indeterminate in the absence   of any prior outside studies, but favored to be chronic. 3. Moderate to severe paranasal sinus disease.       Results called to Dr. Shane Tee at 9:39 PM on 2/21/2022      US ABD COMP    (Results Pending)   DUPLEX UPPER EXT VENOUS RIGHT    (Results Pending)      Assessment:     Hospital Problems  Date Reviewed: 11/8/2021          Codes Class Noted POA    CVA (cerebral vascular accident) [facility]) ICD-10-CM: I63.9  ICD-9-CM: 434.91  2/21/2022 Unknown        Elevated troponin ICD-10-CM: R77.8  ICD-9-CM: 790.6  2/21/2022 Unknown          Elevated of transaminase, no documented history of hepatic disease, hepatitis,  had a seizure yesterday, likely patient had rhabdomyolysis secondary to seizure disorders  History of CVA,  Urosepsis, on multiple antibiotic  Anemia, but no significant hemoglobin changes in last couple days,, no signs of active bleeding    Plan:   IV hydrations, follow renal function closely  Abdominal ultrasound has been ordered  Hepatitis panel ordered   Continue NG tube feeding   Continue on ceftriaxone for UTI s  We will check a PT/INR  We will follow patient closely with you to see if further study is needed  Signed By: Arturo Webster MD     February 27, 2022         Thank you for allowing me to participate in this patients care  [facility] Referring Physician   Almas Guaman MD   .Génesis Huerta

## 2022-02-27 NOTE — PROGRESS NOTES
Consult Date: 2/27/2022  Subjective   Seen and examined in follow up  Response to her name  Still on continuous bladder irrigation    Review of Systems   Unable to perform ROS: Mental status change       Past Medical History:   Diagnosis Date    Diabetes mellitus ([address]     HTN (hypertension)     Hyperlipidemia     Neuropathy     PAD (peripheral artery disease) (HCC)     PCI    Sciatica       Past Surgical History:   Procedure Laterality Date    HX AMPUTATION Right     great toe    HX CERVICAL FUSION      HX OTHER SURGICAL Bilateral     Stent placement    HX OTHER SURGICAL      HX VASCULAR STENT Bilateral     2 in right 1 in left    HX VASCULAR STENT Bilateral      Family History   Problem Relation Age of Onset    Cancer Mother     Diabetes Mother     Hypertension Mother       Social History     Tobacco Use    Smoking status: Never Smoker    Smokeless tobacco: Never Used   Substance Use Topics    Alcohol use: Not Currently       Current Facility-Administered Medications   Medication Dose Route Frequency Provider Last Rate Last Admin    0.9% sodium chloride infusion 250 mL  250 mL IntraVENous PRN Beverly Soto MD        insulin lispro (HUMALOG) injection   SubCUTAneous Q4H Israel Soto MD   9 Units at 02/27/22 1147    latanoprost (XALATAN) 0.005 % ophthalmic solution 1 Drop  1 Drop Right Eye QPM Israel Soto MD        gabapentin (NEURONTIN) capsule 100 mg  100 mg Oral DAILY [place] Givens MD MANUEL   100 mg at 02/25/22 0112    0.9% sodium chloride infusion  50 mL/hr IntraVENous CONTINUOUS Michael Louise MD   Paused at 02/27/22 1045    0.9% sodium chloride infusion 250 mL  250 mL IntraVENous PRN Michael Louise MD        zinc oxide-white petrolatum 20-75 % topical paste   Topical BID Dennise Zhang MD   Given at 02/27/22 0900    cefTRIAXone (ROCEPHIN) 1 g in sterile water (preservative free) 10 mL IV syringe  1 g IntraVENous Q24H [place] Givens MD MANUEL   1 g at 02/26/22 3777  lidocaine (XYLOCAINE) 4 % (40 mg/mL) topical solution   Topical PRN Franci JACKSON MD   Given at 02/26/22 2207    [Held by provider] rivaroxaban (Lennart Retort) tablet 2.5 mg  2.5 mg Oral BID WITH MEALS Ap Mccarthy MD   2.5 mg at 02/24/22 1720    [Held by provider] atorvastatin (LIPITOR) tablet 80 mg  80 mg Oral DAILY Ap Mccarthy MD   80 mg at 02/26/22 0900    labetaloL (NORMODYNE;TRANDATE) 20 mg/4 mL (5 mg/mL) injection 10 mg  10 mg IntraVENous Q6H PRN Ap Mccarthy MD        sodium chloride (NS) flush 5-40 mL  5-40 mL IntraVENous Q8H Franci JACKSON MD   10 mL at 02/27/22 6002    sodium chloride (NS) flush 5-40 mL  5-40 mL IntraVENous PRN Franci JACKSON MD        brimonidine (ALPHAGAN) 0.2 % ophthalmic solution 1 Drop  1 Drop Both Eyes TID Jonatan Suero MD   1 Drop at 02/27/22 0570    ferrous sulfate tablet 325 mg  325 mg Oral BID Franci JACKSON MD   325 mg at 02/27/22 8031    glucose chewable tablet 16 g  4 Tablet Oral PRN Jonatan Suero MD        glucagon (GLUCAGEN) injection 1 mg  1 mg IntraMUSCular PRN Franci JACKSON MD        dextrose 10% infusion 125-250 mL  125-250 mL IntraVENous PRN Jonatan Suero MD        metoprolol succinate (TOPROL-XL) XL tablet 50 mg  50 mg Oral DAILY Rambo Boogie MD   50 mg at 02/24/22 0911    amLODIPine (NORVASC) tablet 10 mg  10 mg Oral DAILY Rambo Boogie MD   10 mg at 02/24/22 0226          Objective     Vital signs for last 24 hours:  Visit Vitals  BP (!) 148/45   Pulse 70   Temp 98.7 °F (37.1 °C) Comment: ax   Resp 12   Ht 5' 7\" (1.702 m)   Wt 81.5 kg (179 lb 10.8 oz) Comment: ICU bed, 1 pillow, bottom sheet, top sheet, 1 towel only   SpO2 100%   BMI 28.14 kg/m²           Recent Results (from the past 24 hour(s))   GLUCOSE, POC    Collection Time: 02/26/22  5:40 PM   Result Value Ref Range    Glucose (POC) 248 (H) 65 - 117 mg/dL    Performed by Terry Murdock    GLUCOSE, POC    Collection Time: 02/26/22 11:31 PM   Result Value Ref Range    Glucose (POC) 310 (H) 65 - 117 mg/dL    Performed by Tiffani Bustillos    GLUCOSE, POC    Collection Time: 02/27/22  4:47 AM   Result Value Ref Range    Glucose (POC) 344 (H) 65 - 117 mg/dL    Performed by Tiffani Bustillos    CBC WITH AUTOMATED DIFF    Collection Time: 02/27/22  5:25 AM   Result Value Ref Range    WBC 22.5 (H) 3.6 - 11.0 K/uL    RBC 2.26 (L) 3.80 - 5.20 M/uL    HGB 6.7 (L) 11.5 - 16.0 g/dL    HCT 19.7 (L) 35.0 - 47.0 %    MCV 87.2 80.0 - 99.0 FL    MCH 29.6 26.0 - 34.0 PG    MCHC 34.0 30.0 - 36.5 g/dL    RDW 16.2 (H) 11.5 - 14.5 %    PLATELET 302 910 - 661 K/uL    MPV 11.2 8.9 - 12.9 FL    NRBC 1.6 (H) 0.0  WBC    ABSOLUTE NRBC 0.36 (H) 0.00 - 0.01 K/uL    NEUTROPHILS 90 (H) 32 - 75 %    LYMPHOCYTES 8 (L) 12 - 49 %    MONOCYTES 1 (L) 5 - 13 %    EOSINOPHILS 0 0 - 7 %    BASOPHILS 0 0 - 1 %    IMMATURE GRANULOCYTES 1 (H) 0 - 0.5 %    ABS. NEUTROPHILS 20.2 (H) 1.8 - 8.0 K/UL    ABS. LYMPHOCYTES 1.8 0.8 - 3.5 K/UL    ABS. MONOCYTES 0.2 0.0 - 1.0 K/UL    ABS. EOSINOPHILS 0.1 0.0 - 0.4 K/UL    ABS. BASOPHILS 0.0 0.0 - 0.1 K/UL    ABS. IMM. GRANS. 0.2 (H) 0.00 - 0.04 K/UL    DF AUTOMATED     METABOLIC PANEL, COMPREHENSIVE    Collection Time: 02/27/22  5:25 AM   Result Value Ref Range    Sodium 139 136 - 145 mmol/L    Potassium 3.9 3.5 - 5.1 mmol/L    Chloride 106 97 - 108 mmol/L    CO2 27 21 - 32 mmol/L    Anion gap 6 5 - 15 mmol/L    Glucose 334 (H) 65 - 100 mg/dL    BUN 78 (H) 6 - 20 mg/dL    Creatinine 2.61 (H) 0.55 - 1.02 mg/dL    BUN/Creatinine ratio 30 (H) 12 - 20      GFR est AA 22 (L) >60 ml/min/1.73m2    GFR est non-AA 18 (L) >60 ml/min/1.73m2    Calcium 8.0 (L) 8.5 - 10.1 mg/dL    Bilirubin, total 0.4 0.2 - 1.0 mg/dL    AST (SGOT) 1,268 (H) 15 - 37 U/L    ALT (SGPT) 2,657 (H) 12 - 78 U/L    Alk.  phosphatase 152 (H) 45 - 117 U/L    Protein, total 6.1 (L) 6.4 - 8.2 g/dL    Albumin 1.7 (L) 3.5 - 5.0 g/dL    Globulin 4.4 (H) 2.0 - 4.0 g/dL    A-G Ratio 0.4 (L) 1.1 - 2.2 PHOSPHORUS    Collection Time: 02/27/22  5:25 AM   Result Value Ref Range    Phosphorus 3.3 2.6 - 4.7 mg/dL   GLUCOSE, POC    Collection Time: 02/27/22 10:35 AM   Result Value Ref Range    Glucose (POC) 250 (H) 65 - 117 mg/dL    Performed by Terry Murdock           Intake/Output Summary (Last 24 hours) at 2/27/2022 1419  Last data filed at 2/27/2022 1320  Gross per 24 hour   Intake 59216.5 ml   Output 04681 ml   Net -5717.5 ml      Current Shift: 02/27 0701 - 02/27 1900  In: 6607.5 [I.V.:187.5]  Out: 7800 [YICHD:7666]  Last 3 Shifts: 02/25 1901 - 02/27 0700  In: 92453.3 [I.V.:2650]  Out: 91846 [Urine:28237]  Physical Exam  Vitals reviewed. Constitutional:       Appearance: She is ill-appearing. Cardiovascular:      Rate and Rhythm: Normal rate. Pulmonary:      Effort: Pulmonary effort is normal. No respiratory distress. Musculoskeletal:         General: No swelling or tenderness. Skin:     General: Skin is warm and dry.         Bell in place CBI and urine still light pink in color  IV fluids-bicarbonate drip ongoing  Data Review:   Recent Results (from the past 24 hour(s))   GLUCOSE, POC    Collection Time: 02/26/22  5:40 PM   Result Value Ref Range    Glucose (POC) 248 (H) 65 - 117 mg/dL    Performed by Terry Murdock    GLUCOSE, POC    Collection Time: 02/26/22 11:31 PM   Result Value Ref Range    Glucose (POC) 310 (H) 65 - 117 mg/dL    Performed by Micheline London    GLUCOSE, POC    Collection Time: 02/27/22  4:47 AM   Result Value Ref Range    Glucose (POC) 344 (H) 65 - 117 mg/dL    Performed by Micheline London    CBC WITH AUTOMATED DIFF    Collection Time: 02/27/22  5:25 AM   Result Value Ref Range    WBC 22.5 (H) 3.6 - 11.0 K/uL    RBC 2.26 (L) 3.80 - 5.20 M/uL    HGB 6.7 (L) 11.5 - 16.0 g/dL    HCT 19.7 (L) 35.0 - 47.0 %    MCV 87.2 80.0 - 99.0 FL    MCH 29.6 26.0 - 34.0 PG    MCHC 34.0 30.0 - 36.5 g/dL    RDW 16.2 (H) 11.5 - 14.5 %    PLATELET 119 671 - 610 K/uL    MPV 11.2 8.9 - 12.9 FL NRBC 1.6 (H) 0.0  WBC    ABSOLUTE NRBC 0.36 (H) 0.00 - 0.01 K/uL    NEUTROPHILS 90 (H) 32 - 75 %    LYMPHOCYTES 8 (L) 12 - 49 %    MONOCYTES 1 (L) 5 - 13 %    EOSINOPHILS 0 0 - 7 %    BASOPHILS 0 0 - 1 %    IMMATURE GRANULOCYTES 1 (H) 0 - 0.5 %    ABS. NEUTROPHILS 20.2 (H) 1.8 - 8.0 K/UL    ABS. LYMPHOCYTES 1.8 0.8 - 3.5 K/UL    ABS. MONOCYTES 0.2 0.0 - 1.0 K/UL    ABS. EOSINOPHILS 0.1 0.0 - 0.4 K/UL    ABS. BASOPHILS 0.0 0.0 - 0.1 K/UL    ABS. IMM. GRANS. 0.2 (H) 0.00 - 0.04 K/UL    DF AUTOMATED     METABOLIC PANEL, COMPREHENSIVE    Collection Time: 02/27/22  5:25 AM   Result Value Ref Range    Sodium 139 136 - 145 mmol/L    Potassium 3.9 3.5 - 5.1 mmol/L    Chloride 106 97 - 108 mmol/L    CO2 27 21 - 32 mmol/L    Anion gap 6 5 - 15 mmol/L    Glucose 334 (H) 65 - 100 mg/dL    BUN 78 (H) 6 - 20 mg/dL    Creatinine 2.61 (H) 0.55 - 1.02 mg/dL    BUN/Creatinine ratio 30 (H) 12 - 20      GFR est AA 22 (L) >60 ml/min/1.73m2    GFR est non-AA 18 (L) >60 ml/min/1.73m2    Calcium 8.0 (L) 8.5 - 10.1 mg/dL    Bilirubin, total 0.4 0.2 - 1.0 mg/dL    AST (SGOT) 1,268 (H) 15 - 37 U/L    ALT (SGPT) 2,657 (H) 12 - 78 U/L    Alk. phosphatase 152 (H) 45 - 117 U/L    Protein, total 6.1 (L) 6.4 - 8.2 g/dL    Albumin 1.7 (L) 3.5 - 5.0 g/dL    Globulin 4.4 (H) 2.0 - 4.0 g/dL    A-G Ratio 0.4 (L) 1.1 - 2.2     PHOSPHORUS    Collection Time: 02/27/22  5:25 AM   Result Value Ref Range    Phosphorus 3.3 2.6 - 4.7 mg/dL   GLUCOSE, POC    Collection Time: 02/27/22 10:35 AM   Result Value Ref Range    Glucose (POC) 250 (H) 65 - 117 mg/dL    Performed by Terry BRICE ABD COMP   Final Result   Gallbladder sludge without other acute abnormality. XR CHEST PORT   Final Result   The cardiomediastinal silhouette is appropriate for age, technique,   and lung expansion. Pulmonary vasculature is not congested. The lungs are   essentially clear. No effusion or pneumothorax is seen.          CT HEAD WO CONT   Final Result Negative for acute intracranial process. CT ABD PELV WO CONT   Final Result   Hemorrhage within the urinary bladder. Mild left   hydroureteronephrosis. XR SWALLOW FUNC VIDEO   Final Result   Penetration with thin consistency. No aspiration. CTA HEAD NECK W WO CONT   Final Result   Negative head and neck CTA. Please note that all carotid bifurcation stenoses are measured as per NASCET   criteria. CT CODE NEURO HEAD WO CONTRAST   Final Result   1. No acute large vessel intracranial ischemia, hemorrhage. Called report to   patient's nurse Jack Morris at 7:12 PM 2/22/2022.   2. Right basal ganglia lacunar infarct. 3. Periventricular microangiopathy. 4. Pansinusitis. See above. MRI BRAIN WO CONT   Final Result   Acute infarction left paracentral lobule and region of the white   matter of the left motor cortex. CT CODE NEURO HEAD WO CONTRAST   Final Result      1. No acute intracranial hemorrhage. 2. Left basal ganglia lacunar infarct, which is age indeterminate in the absence   of any prior outside studies, but favored to be chronic. 3. Moderate to severe paranasal sinus disease.       Results called to Dr. Philomena Scott at 9:39 PM on 2/21/2022      DUPLEX UPPER EXT VENOUS RIGHT    (Results Pending)        Patient Active Problem List   Diagnosis Code    HTN (hypertension) I10    Hyperlipidemia E78.5    Neuropathy G62.9    Sciatica M54.30    Diabetes mellitus with neurological manifestations, uncontrolled ([address] E11.49, E11.65    Infection of nailbed of toe of left foot L03.032    [facility]peripheral artery disease) ([facility]) I73.9    Hypercalcemia E83.52    DM type 2 causing vascular disease ([address] E11.59    Type 2 diabetes mellitus with nephropathy ([address] E11.21    Acute osteomyelitis of ankle or foot ([address] M86.179    Diabetic peripheral neuropathy ([address] E11.42    Spinal stenosis in cervical region M48.02    Ischemia of both lower extremities I99.8    Pain in both lower legs M79.661, [id number]    CVA (cerebral vascular accident) [facility] I63.9    Elevated troponin R77.8        DIAGNOSES:  1. RICARDO ON CKD. Eusebio [place] grade 2-improving  2. CKD stage 3B  3. AG metabolic acidosis-corrected  4. Urinary Tract Infection Complicated   5. Hemorrhagic UTI  6. Diabetes Mellitus II with complications  7. CVA-acute infarct left paracentral lobule and region of white matter of left motor cortex    DISCUSSION:  Urine output is difficult to estimate, since patient is on continuous bladder irrigation. I do not think I's and O's were accurately noted. Hemoglobin has come down; GI work-up also underway. Markedly elevated LFTs  PLAN:  · Continue track renal panel. · Avoid hypotension   · Monitor renal panel        Thanks for consulting me. Please don't hesitate to contact me if any questions arise of if I can assist in any manner. This dictation was done by dragon, computer voice recognition software. Often unanticipated grammatical, syntax, phones and other interpretive errors are inadvertently transcribed. Please excuse errors that have escaped final proofreading. Please contact me if you suspect dictation or transcription errors.   Dr Storm Sees  [address], [facility], [address] [facility]  Cell Phone: [phone]  Office phone: [phone]  Fax: [phone]

## 2022-02-28 NOTE — CONSULTS
Infectious Disease Consult Note    Reason for Consult:  Positive hep C Ab   Date of Consultation: February 28, 2022  Date of Admission: 2/21/2022  Referring Physician: Dr Aracely Welch     HPI: Asked to see pt for positive hep C serology. She was admitted on 2/22 w c/o lower ext weakness, and slurred speech after a recent diagnosis with CVA during an admission to [facility]. Non-contrast MRI head on 02/22 revealed a new left paracentral region and multiple old strokes. Recent CT head on 02/25 was neg for an acute intracranial process. She reported pelvic pain and hematuria on 2/24 while on the floors. Rapid response was called on 2/25 for decreased responsiveness, and she was transferred to the ICU for close monitoring. She also developed hematuria while hospitalized, but was anticoagulated on Xarelto and ASA both of which were held. E. coli was isolated from her urine Cx on 2/23, for which she completed a 5-day course of ceftriaxone, currently not on any antibiotic. Blood Cx on 02/25 is neg. CT abdo/pel on 02/25 revealed hemorrhage within the urinary bladder. mild left hydroureteronephrosis. She is on CBI per urology. Cr is trending down on serial labs. Hep a and B serologies were negative on 2/27 however hep C serology was reactive, family denies a known history of chronic hep C. She spiked a low grade temp of 100.1F earlier today, is hemodynamically stable off pressor support to maintain O2 sats on 2 L.     Review of Systems: Unobtainable, minimally responsive      Past Medical History:  Past Medical History:   Diagnosis Date    Diabetes mellitus ([facility]     HTN (hypertension)     Hyperlipidemia     Neuropathy     PAD (peripheral artery disease) (HCC)     PCI    Sciatica        Past surgical history   Past Surgical History:   Procedure Laterality Date    HX AMPUTATION Right     great toe    HX CERVICAL FUSION      HX OTHER SURGICAL Bilateral     Stent placement    HX OTHER SURGICAL      HX VASCULAR STENT Bilateral     2 in right 1 in left    HX VASCULAR STENT Bilateral         Social History   Social History     Tobacco Use    Smoking status: Never Smoker    Smokeless tobacco: Never Used   Substance Use Topics    Alcohol use: Not Currently    Drug use: No        Family history   Family History   Problem Relation Age of Onset    Cancer Mother     Diabetes Mother     Hypertension Mother           Allergies:  No Known Allergies      Medications:  No current facility-administered medications on file prior to encounter. Current Outpatient Medications on File Prior to Encounter   Medication Sig Dispense Refill    atorvastatin (LIPITOR) 20 mg tablet TAKE 1 TABLET BY MOUTH EVERY NIGHT 30 Tablet 5    gabapentin (NEURONTIN) 300 mg capsule TAKE 2 CAPSULES BY MOUTH THREE TIMES DAILY WITH MEALS 180 Capsule 2    Blood-Glucose Meter (OneTouch Verio Flex meter) misc Test blood glucose 3 time daily Dx Code: E11.65 1 Each 0    glucose blood VI test strips (OneTouch Verio test strips) strip Test blood glucose 3 time daily Dx Code: E11.65 300 Strip 3    lancets (OneTouch Delica Plus Lancet) 33 gauge misc Test blood glucose 3 time daily Dx Code: E11.65 300 Lancet 3    BD [place] 2nd Gen Pen Needle 32 gauge x 5/32\" ndle USE TO INJECT LANTUS AND VICTOZA DAILY 200 Pen Needle 4    insulin glargine (Lantus Solostar U-100 Insulin) 100 unit/mL (3 mL) inpn 22 Units by SubCUTAneous route daily. 30 mL 2    liraglutide (Victoza 3-Sheldon) 0.6 mg/0.1 mL (18 mg/3 mL) pnij ADMINISTER 1.8 MG UNDER THE SKIN DAILY. 27 mL 3    dilTIAZem ER (CARDIZEM CD) 180 mg capsule TAKE 1 CAPSULE BY MOUTH DAILY 90 Cap 0    brimonidine (ALPHAGAN) 0.2 % ophthalmic solution Administer 1 Drop to both eyes three (3) times daily.  aspirin delayed-release 81 mg tablet Take  by mouth daily.       metFORMIN (GLUCOPHAGE) 1,000 mg tablet TAKE 1/2 TABLET BY MOUTH TWICE DAILY WITH MEALS 180 Tab 1    losartan (COZAAR) 50 mg tablet TK 2 TS PO QD IN THE EVENING 1    VICTOZA 3-BANDAR 0.6 mg/0.1 mL (18 mg/3 mL) pnij ADMINISTER 1.8 MG UNDER THE SKIN EVERY MORNING 9 mL 2    clopidogrel (PLAVIX) 75 mg tab Take 75 mg by mouth daily.  CHELY PEN NEEDLE 32 gauge x 5/32\" ndle USE TO INJECT LANTUS AND VICTOZA EVERY  Pen Needle 11    ferrous sulfate 325 mg (65 mg iron) tablet Take 1 Tab by mouth two (2) times a day. 60 Tab 5    polyethylene glycol (MIRALAX) 17 gram packet Take 1 Packet by mouth daily. 30 Packet 1    oxyCODONE IR (ROXICODONE) 5 mg immediate release tablet Take 5 mg by mouth every six (6) hours as needed for Pain.  HYDROcodone-acetaminophen (NORCO) 5-325 mg per tablet Take  by mouth.              Physical Exam:    Vitals:   Patient Vitals for the past 24 hrs:   Temp Pulse Resp BP SpO2   02/28/22 1200 -- 70 15 (!) 135/39 100 %   02/28/22 1100 100.1 °F (37.8 °C) 69 17 (!) 135/51 99 %   02/28/22 1000 -- 78 20 (!) 192/76 100 %   02/28/22 0900 -- 68 15 (!) 173/52 100 %   02/28/22 0800 -- 73 15 (!) 166/47 100 %   02/28/22 0711 -- -- -- -- 100 %   02/28/22 0700 99.3 °F (37.4 °C) 73 16 (!) 153/53 100 %   02/28/22 0600 -- 76 16 (!) 162/47 100 %   02/28/22 0500 -- 73 16 (!) 159/43 100 %   02/28/22 0400 -- 72 15 (!) 140/45 100 %   02/28/22 0300 99.2 °F (37.3 °C) 72 19 (!) 129/46 100 %   02/28/22 0200 -- 71 17 (!) 132/39 100 %   02/28/22 0100 -- 73 18 (!) 151/45 100 %   02/28/22 0000 -- 70 18 (!) 153/48 99 %   02/27/22 2300 99.6 °F (37.6 °C) 73 17 (!) 151/49 100 %   02/27/22 2200 -- 74 18 (!) 160/59 100 %   02/27/22 2100 -- 76 17 (!) 149/69 100 %   02/27/22 2000 -- 68 14 (!) 157/53 100 %   02/27/22 1900 99.2 °F (37.3 °C) 70 15 (!) 139/55 100 %   02/27/22 1800 -- 71 15 (!) 155/47 100 %   02/27/22 1700 -- 72 15 (!) 164/51 100 %   02/27/22 1600 -- 71 18 (!) 152/52 100 %   02/27/22 1500 98.9 °F (37.2 °C) 71 20 (!) 157/51 100 %   02/27/22 1400 99 °F (37.2 °C) 71 16 (!) 159/58 100 %   02/27/22 1345 98.6 °F (37 °C) 71 16 (!) 155/56 --   02/27/22 1300 98.6 °F (37 °C) 72 17 (!) 154/65 --   ·   · GEN: NAD, minimally responsive, blank stare  · HEENT: NCAT, PERRLA, + NGT   · HEART: S1, S2+, RRR, No murmur   · Lungs: Decreased at the bases, no wheeze/rhonchi   · Abdomen: soft, ND, NT, +BS   · Genitourinary: + giraldo cath   · Extremities: Trace edema, partial amp of right great toe, ischemic changes involving left great toe   · Skin: No obvious wounds or ulcers     Labs:   Recent Labs     02/28/22  0534 02/27/22  0525 02/26/22  0400   WBC 20.0* 22.5* 22.3*   HGB 7.4* 6.7* 7.5*   HCT 22.4* 19.7* 22.8*    349 350     Recent Labs     02/28/22  0534 02/27/22  0525 02/26/22  0400   BUN 54*  53* 78* 76*   CREA 1.78*  1.76* 2.61* 3.09*       Microbiology Data:  - Urine Cx 02/23: E.coli   - Blood Cx 02/25: Neg      Imaging:   - CXR 02/25: The cardiomediastinal silhouette is appropriate for age, technique,  and lung expansion. Pulmonary vasculature is not congested. The lungs are  essentially clear. No effusion or pneumothorax is seen.     Assessment / Plan:     1.  UTI complicated by hematuria, suspect underlying neurogenic bladder from recent CVA       Does not appear septic, low-grade temp, leukocytosis of 20.0, down from 22.5 on 2/20       E. coli isolated from urine culture on 2/23, blood Cx from 2/25 staying negative        Being followed by urology, on CBI        S/p 5 days of ceftriaxone, will broaden coverage with Zosyn       Routine labs in the morning     2. Positive hep C serology, no known h/o hep C, will check RNA VL, further work if elevated     3. Recently CVA, w right sided weakness, aphagia and dysphagia. Getting tube feeds via NGT, will likely need peg       Monitor for development of pressure ulcers     4. Acute blood loss anemia: Hgb of 7.4 up from 6.7 on 02/27    5. PAD s/p amp of right great toe, ischemic changes involving distal phalanx of left great toe noted on exam     6. Acute renal failure: Resolving Cr trending down on todays labs     7.  AMS, due to metabolic encephalopathy and CVA, aphasic          Layo Raymond MD     2/28/2022

## 2022-02-28 NOTE — PROGRESS NOTES
SPEECH LANGUAGE PATHOLOGY BEDSIDE SWALLOW RE-EVALUATION  Patient: Savana Singletary (82 y.o. female)  Date: 2/28/2022  Primary Diagnosis: CVA (cerebral vascular accident) ([facility] [address] [I63.9]  Elevated troponin [R77.8]        Precautions: aspiration       ASSESSMENT :  Patient awake, but drowsy. She attends to the speaker, still unable to verbalize, though attempts are being made. She is trying to follow some oral motor commands. Oral care completed w/ oral suction prior to p.o. trials. Prompted patient to state name, daughters name and count to 10 w/ MAX A, however patient was unable to perform at this time. Based on the objective data described below, the patient presents with severe oral dysphagia negatively impacting bolus control and swallow initiation. Right facial droops continues. Patient w/ slow lingual and labial movements to verbal prompts. Lingual and labial weakness, unable to create labial seal volitional and around spoon. Patient is managing secretions and reflexive swallow noted, however not able to swallow on command. Administered moderately thick coated spoon. Patient w/ adequate bolus awareness, responds to spoon and opens mouth. She has difficulty w/ labial closure around spoon and pulling liquid from spoon. This occurred w/ each trial x 3. More bolus added of moderately thick H20 1/4 tsp to be administered. Patient w/ reduced bolus transit and lingual manipulation. Patient unable to initiate swallow response. Absent swallow present. Oral suction provided and no further trials administered. Patient will benefit from skilled intervention to address the above impairments. Patients rehabilitation potential is considered to be Fair     PLAN :  Recommendations and Planned Interventions:  NPO, continue w/ TF via NG. Educated daughter to oral care, suction and encouraging communication and oral motor movements when w/ patient. SLP to continue to follow.    Frequency/Duration: Patient will be followed by speech-language pathology 5 times a week to address goals. Discharge Recommendations: To Be Determined     SUBJECTIVE:   Patient seen for swallow re-evaluation s/p transfer to the ICU for CVA. Daughter present in room. OBJECTIVE:     CXR Results  (Last 48 hours)      None           CT Results  (Last 48 hours)      None             Past Medical History:   Diagnosis Date    Diabetes mellitus (HCC)     HTN (hypertension)     Hyperlipidemia     Neuropathy     PAD (peripheral artery disease) (HCC)     PCI    Sciatica      Past Surgical History:   Procedure Laterality Date    HX AMPUTATION Right     great toe    HX CERVICAL FUSION      HX OTHER SURGICAL Bilateral     Stent placement    HX OTHER SURGICAL      HX VASCULAR STENT Bilateral     2 in right 1 in left    HX VASCULAR STENT Bilateral      Prior Level of Function/Home Situation: unknown  Home Situation  Home Environment: Private residence  # Steps to Enter: 0  Wheelchair Ramp: Yes  One/Two Story Residence: One story  Living Alone: No  Support Systems: Child(nelida)  Patient Expects to be Discharged to[de-identified] Home  Current DME Used/Available at Home: Wheelchair,Walker, rollator,Walker, rolling,Commode, bedside  Diet prior to admission: regular, thin  Current Diet:  NPO   Cognitive and Communication Status:  Neurologic State: Mika [place]wn open to voice,Eyes open to stimulus,Eyes open spontaneously  Orientation Level: Unable to verbalize  Cognition: Decreased command following  Perception: Appears intact  Perseveration: No perseveration noted  Safety/Judgement: Decreased insight into deficits  Swallowing Evaluation:   Oral Assessment:  Oral Assessment  Labial: Right droop; Decreased rate;Decreased seal  Dentition: Intact  Oral Hygiene: dried lingual secretions, foul odor  Lingual: Decreased rate;Decreased strength  Velum: Unable to visualize  P.O.  Trials:  Patient Position: upright in bed  Vocal quality prior to P.O.:    Consistency Presented: Honey thick liquid  How Presented: Spoon     Bolus Acceptance: Impaired  Bolus Formation/Control: Impaired  Type of Impairment: Lip closure; Incomplete  Propulsion: Delayed (# of seconds)  Oral Residue: Greater than 50% of bolus  Initiation of Swallow: Absent  Laryngeal Elevation: Absent     Pharyngeal Phase Characteristics: Poor endurance             Oral Phase Severity: Severe  Pharyngeal Phase Severity : Moderate  Voice:   nonverbal    Pain:  Pain Scale 1: Adult Nonverbal Pain Scale  Pain Intensity 1: 0       After treatment:   Patient left in no apparent distress in bed, Caregiver / family present, and Bed / chair alarm activated    COMMUNICATION/EDUCATION:   Patient was educated regarding purpose of SLP assessment, POC, diet recs and sw safety precautions. Patient demonstrated [address] understanding as evidenced by AMS. The patient's plan of care including recommendations, planned interventions, and recommended diet changes were discussed with: Registered nurse. Patient/family have participated as able in goal setting and plan of care. Thank you for this referral.  Bayron De Paz, SLP M.S. CCC-SLP  Time Calculation: 17 mins           Problem: Dysphagia (Adult)  Goal: *Acute Goals and Plan of Care (Insert Text)  Description: Speech Therapy Swallow Goals  Initiated 2/22/2022  -Patient will tolerate easy to chew diet with thin liquids without clinical indicators of aspiration given minimal cues within 7 day(s). [NOT MET]        -Patient will tolerate PO trials without clinical indicators of aspiration given min cues within 7 day(s). [PROGRESSING]      -Patient will participate in modified barium swallow study within 7 day(s). [MET]    -Participate in speech-language evaluation as indicated      -Patient will demonstrate understanding of swallow safety precautions and aspiration precautions, diet recs with minimal cues within 7 day(s).            Outcome: Not Progressing Towards Goal

## 2022-02-28 NOTE — PROGRESS NOTES
Neurology Progress Note    Patient ID:  Zion Stephens  [id number]  27 y.o.  1945    Subjective: The patient is seen in follow-up this morning for question of seizure after stroke she is lethargic though arousable, not much change from yesterday. No sedation since she was given 2 mg of Ativan the night before and also Norco tablet for her neuropathic pain in the legs due to diabetic neuropathy. Patient is a 78year old woman with HTN, DM who had a stroke last week with right facial droop and right sided weakness worked up in [facility] who was brought in for worsening speech and left sided weakness per daughters at bedside. She is living at home and uses a walker to ambulate at baseline. Ct head done yesterday shows old basal ganglia stroke. Her BP are elevated to 848T systolic.     Current Facility-Administered Medications   Medication Dose Route Frequency    0.9% sodium chloride infusion 250 mL  250 mL IntraVENous PRN    insulin lispro (HUMALOG) injection   SubCUTAneous Q4H    latanoprost (XALATAN) 0.005 % ophthalmic solution 1 Drop  1 Drop Right Eye QPM    gabapentin (NEURONTIN) capsule 100 mg  100 mg Oral DAILY    0.9% sodium chloride infusion  50 mL/hr IntraVENous CONTINUOUS    0.9% sodium chloride infusion 250 mL  250 mL IntraVENous PRN    zinc oxide-white petrolatum 20-75 % topical paste   Topical BID    cefTRIAXone (ROCEPHIN) 1 g in sterile water (preservative free) 10 mL IV syringe  1 g IntraVENous Q24H    lidocaine (XYLOCAINE) 4 % (40 mg/mL) topical solution   Topical PRN    [Held by provider] rivaroxaban (XARELTO) tablet 2.5 mg  2.5 mg Oral BID WITH MEALS    [Held by provider] atorvastatin (LIPITOR) tablet 80 mg  80 mg Oral DAILY    labetaloL (NORMODYNE;TRANDATE) 20 mg/4 mL (5 mg/mL) injection 10 mg  10 mg IntraVENous Q6H PRN    sodium chloride (NS) flush 5-40 mL  5-40 mL IntraVENous Q8H    sodium chloride (NS) flush 5-40 mL  5-40 mL IntraVENous PRN    brimonidine (ALPHAGAN) 0.2 % ophthalmic solution 1 Drop  1 Drop Both Eyes TID    ferrous sulfate tablet 325 mg  325 mg Oral BID    glucose chewable tablet 16 g  4 Tablet Oral PRN    glucagon (GLUCAGEN) injection 1 mg  1 mg IntraMUSCular PRN    dextrose 10% infusion 125-250 mL  125-250 mL IntraVENous PRN    metoprolol succinate (TOPROL-XL) XL tablet 50 mg  50 mg Oral DAILY    amLODIPine (NORVASC) tablet 10 mg  10 mg Oral DAILY     Objective:     Patient Vitals for the past 8 hrs:   BP Temp Pulse Resp SpO2   02/28/22 0900 (!) 173/52 -- 68 15 100 %   02/28/22 0800 (!) 166/47 -- 73 15 100 %   02/28/22 0711 -- -- -- -- 100 %   02/28/22 0700 (!) 153/53 99.3 °F (37.4 °C) 73 16 100 %   02/28/22 0600 (!) 162/47 -- 76 16 100 %   02/28/22 0500 (!) 159/43 -- 73 16 100 %   02/28/22 0400 (!) 140/45 -- 72 15 100 %   02/28/22 0300 (!) 129/46 99.2 °F (37.3 °C) 72 19 100 %   02/28/22 0200 (!) 132/39 -- 71 17 100 %     02/28 0701 - 02/28 1900  In: -   Out: 2820 [Urine:2300]  02/26 1901 - 02/28 0700  In: 75156.5 [I.V.:1007.5]  Out: 92058 [Urine:78066]    Lab Review   Recent Results (from the past 24 hour(s))   HEPATITIS PANEL, ACUTE    Collection Time: 02/27/22 10:15 AM   Result Value Ref Range    Hepatitis A, IgM NONREACTIVE NONREACTIVE      __        Hepatitis B surface Ag <0.10 Index    Hep B surface Ag Interp. Negative Negative    __        Hepatitis B core, IgM NONREACTIVE NONREACTIVE    __        Hepatitis C virus Ab >11.00 Index    Hep C virus Ab Interp.  Reactive (A) NONREACTIVE     GLUCOSE, POC    Collection Time: 02/27/22 10:35 AM   Result Value Ref Range    Glucose (POC) 250 (H) 65 - 117 mg/dL    Performed by Terry MILLER, POC    Collection Time: 02/27/22  4:27 PM   Result Value Ref Range    Glucose (POC) 306 (H) 65 - 117 mg/dL    Performed by Terry Murdock    GLUCOSE, POC    Collection Time: 02/27/22  7:19 PM   Result Value Ref Range    Glucose (POC) 291 (H) 65 - 117 mg/dL    Performed by Terry Murdock    GLUCOSE, POC Collection Time: 02/27/22 11:27 PM   Result Value Ref Range    Glucose (POC) 265 (H) 65 - 117 mg/dL    Performed by [facility]    Collection Time: 02/28/22  3:44 AM   Result Value Ref Range    Glucose (POC) 215 (H) 65 - 117 mg/dL    Performed by Luci Klein    CBC WITH AUTOMATED DIFF    Collection Time: 02/28/22  5:34 AM   Result Value Ref Range    WBC 20.0 (H) 3.6 - 11.0 K/uL    RBC 2.55 (L) 3.80 - 5.20 M/uL    HGB 7.4 (L) 11.5 - 16.0 g/dL    HCT 22.4 (L) 35.0 - 47.0 %    MCV 87.8 80.0 - 99.0 FL    MCH 29.0 26.0 - 34.0 PG    MCHC 33.0 30.0 - 36.5 g/dL    RDW 16.1 (H) 11.5 - 14.5 %    PLATELET 686 193 - 120 K/uL    MPV 11.3 8.9 - 12.9 FL    NRBC 2.0 (H) 0.0  WBC    ABSOLUTE NRBC 0.40 (H) 0.00 - 0.01 K/uL    NEUTROPHILS 85 (H) 32 - 75 %    LYMPHOCYTES 11 (L) 12 - 49 %    MONOCYTES 2 (L) 5 - 13 %    EOSINOPHILS 1 0 - 7 %    BASOPHILS 0 0 - 1 %    IMMATURE GRANULOCYTES 1 (H) 0 - 0.5 %    ABS. NEUTROPHILS 17.1 (H) 1.8 - 8.0 K/UL    ABS. LYMPHOCYTES 2.2 0.8 - 3.5 K/UL    ABS. MONOCYTES 0.3 0.0 - 1.0 K/UL    ABS. EOSINOPHILS 0.1 0.0 - 0.4 K/UL    ABS. BASOPHILS 0.1 0.0 - 0.1 K/UL    ABS. IMM.  GRANS. 0.3 (H) 0.00 - 0.04 K/UL    DF AUTOMATED     RENAL FUNCTION PANEL    Collection Time: 02/28/22  5:34 AM   Result Value Ref Range    Sodium 146 (H) 136 - 145 mmol/L    Potassium 4.0 3.5 - 5.1 mmol/L    Chloride 112 (H) 97 - 108 mmol/L    CO2 30 21 - 32 mmol/L    Anion gap 4 (L) 5 - 15 mmol/L    Glucose 194 (H) 65 - 100 mg/dL    BUN 53 (H) 6 - 20 mg/dL    Creatinine 1.76 (H) 0.55 - 1.02 mg/dL    BUN/Creatinine ratio 30 (H) 12 - 20      GFR est AA 34 (L) >60 ml/min/1.73m2    GFR est non-AA 28 (L) >60 ml/min/1.73m2    Calcium 8.5 8.5 - 10.1 mg/dL    Phosphorus 2.6 2.6 - 4.7 mg/dL    Albumin 1.7 (L) 3.5 - 5.0 g/dL   METABOLIC PANEL, COMPREHENSIVE    Collection Time: 02/28/22  5:34 AM   Result Value Ref Range    Sodium 146 (H) 136 - 145 mmol/L    Potassium 4.0 3.5 - 5.1 mmol/L    Chloride 110 (H) 97 - 108 mmol/L    CO2 30 21 - 32 mmol/L    Anion gap 6 5 - 15 mmol/L    Glucose 191 (H) 65 - 100 mg/dL    BUN 54 (H) 6 - 20 mg/dL    Creatinine 1.78 (H) 0.55 - 1.02 mg/dL    BUN/Creatinine ratio 30 (H) 12 - 20      GFR est AA 34 (L) >60 ml/min/1.73m2    GFR est non-AA 28 (L) >60 ml/min/1.73m2    Calcium 8.4 (L) 8.5 - 10.1 mg/dL    Bilirubin, total 0.3 0.2 - 1.0 mg/dL    AST (SGOT) 317 (H) 15 - 37 U/L    ALT (SGPT) 1,570 (H) 12 - 78 U/L    Alk. phosphatase 151 (H) 45 - 117 U/L    Protein, total 6.0 (L) 6.4 - 8.2 g/dL    Albumin 1.7 (L) 3.5 - 5.0 g/dL    Globulin 4.3 (H) 2.0 - 4.0 g/dL    A-G Ratio 0.4 (L) 1.1 - 2.2     PROTHROMBIN TIME + INR    Collection Time: 02/28/22  5:34 AM   Result Value Ref Range    Prothrombin time 16.3 (H) 11.9 - 14.6 sec    INR 1.4 (H) 0.9 - 1.1     GLUCOSE, POC    Collection Time: 02/28/22  6:56 AM   Result Value Ref Range    Glucose (POC) 203 (H) 65 - 117 mg/dL    Performed by Monet Rasmussen      Additional comments: MRI of the brain shows right paracentral lobule infarct    NEUROLOGICAL EXAM:  Appearance: The patient is well developed, drowsy and lethargic, but arousable. She says 1 or 2 words but no full conversation. Mental Status: Could not get much answers, per nurse earlier she knew she was in the hospital.   Cranial Nerves:   Intact visual fields. GUMARO, EOM's full, no nystagmus, no ptosis. Facial movement is symmetric. Motor:   Moves all extremities to deep pain may be left side slightly weaker than the right. Reflexes:   Deep tendon reflexes not checked. Sensory:   Normal to touch, pinprick and vibration. Gait:   Cannot be checked. Tremor:   No tremor noted. Cerebellar:   Could not be checked. Neurovascular:  Normal heart sounds and regular rhythm     Assessment:   1. Acute infarction left paracentral lobule and region of the white matter of the left motor cortex: This is the cause of her new right-sided weakness.   2.  Lethargy/encephalopathy: She is more awake today but still very slow in her responses. She has diabetic neuropathy which causes neuropathic pain. She is supposed to be taking gabapentin and will increase the dose to 100 mg 3 times daily once she starts waking up. 3.  Hypertension. 4.  Diabetes mellitus  Plan:   1.  Gabapentin is on hold for now. Once she starts becoming more awake we'll start at 100 mg 3 times daily. 2.  Please give Ativan 1 mg only if she goes into a grand mal seizure lasting for more than 3 minutes.     Thank you,    Signed:  Bobbi Cook MD  2/28/2022  11:46 AM

## 2022-02-28 NOTE — PROGRESS NOTES
Progress Note    Patient: Kyleigh Conway MRN: [id number]  SSN: [id number]    YOB: 1945  Age: 78 y.o. Sex: female      Admit Date: 2/21/2022    LOS: 3 days     Subjective:     Non verbal. Seen in the icu has NG tube in place. Patient had multiple CVAs with JUS showing normal EF no thrombus. Patient has been bedbound with minimal out of bed activity. She has acute kidney injury, possible non-STEMI, hepatitis etiology unclear possiblehepatitis C metabolic encephalopathy, AST and ALT elevated with some downtrending INR is 1.4. With sepsis and    Objective:     Vitals:    02/28/22 1300 02/28/22 1400 02/28/22 1500 02/28/22 1601   BP: (!) 141/62 (!) 157/50 (!) 140/98 (!) 137/47   Pulse: 72 73 71 69   Resp: 16 20 17 19   Temp:       SpO2: 100% 100% 100% 100%   Weight:       Height:            Intake and Output:  Current Shift: 02/28 0701 - 02/28 1900  In: 6500   Out: 8900 [GBXVF:6668]  Last three shifts: 02/26 1901 - 02/28 0700  In: 08927.5 [I.V.:1007.5]  Out: 85101 [Urine:28062]    Physical Exam:   General:  lethargic. Eyes:  Conjunctivae/corneas clear. PERRL, EOMs intact. Fundi benign   Ears:  Normal TMs and external ear canals both ears. Nose: Nares normal. Septum midline. Mucosa normal. No drainage or sinus tenderness. Mouth/Throat: Lips, mucosa, and tongue normal. Teeth and gums normal.   Neck: Supple, symmetrical, trachea midline, no adenopathy, thyroid: no enlargment/tenderness/nodules, no carotid bruit and no JVD. Back:   Symmetric, no curvature. ROM normal. No CVA tenderness. Lungs:   Clear to auscultation bilaterally. Heart:  Regular rate and rhythm, S1, S2 normal, no murmur, click, rub or gallop. Abdomen:   Soft, non-tender. Bowel sounds normal. No masses,  No organomegaly. Extremities:  Partial toe amputation. Pulses: 2+ and symmetric all extremities.    Skin: Skin color, texture, turgor normal. No rashes or lesions   Lymph nodes:  Weakness all extremities Neurologic: weakness       Lab/Data Review: All lab results for the last 24 hours reviewed. Recent Results (from the past 24 hour(s))   GLUCOSE, POC    Collection Time: 02/27/22  7:19 PM   Result Value Ref Range    Glucose (POC) 291 (H) 65 - 117 mg/dL    Performed by Terry Murdock    GLUCOSE, POC    Collection Time: 02/27/22 11:27 PM   Result Value Ref Range    Glucose (POC) 265 (H) 65 - 117 mg/dL    Performed by Bhargav [address], POC    Collection Time: 02/28/22  3:44 AM   Result Value Ref Range    Glucose (POC) 215 (H) 65 - 117 mg/dL    Performed by Kye Padilla    CBC WITH AUTOMATED DIFF    Collection Time: 02/28/22  5:34 AM   Result Value Ref Range    WBC 20.0 (H) 3.6 - 11.0 K/uL    RBC 2.55 (L) 3.80 - 5.20 M/uL    HGB 7.4 (L) 11.5 - 16.0 g/dL    HCT 22.4 (L) 35.0 - 47.0 %    MCV 87.8 80.0 - 99.0 FL    MCH 29.0 26.0 - 34.0 PG    MCHC 33.0 30.0 - 36.5 g/dL    RDW 16.1 (H) 11.5 - 14.5 %    PLATELET 951 188 - 843 K/uL    MPV 11.3 8.9 - 12.9 FL    NRBC 2.0 (H) 0.0  WBC    ABSOLUTE NRBC 0.40 (H) 0.00 - 0.01 K/uL    NEUTROPHILS 85 (H) 32 - 75 %    LYMPHOCYTES 11 (L) 12 - 49 %    MONOCYTES 2 (L) 5 - 13 %    EOSINOPHILS 1 0 - 7 %    BASOPHILS 0 0 - 1 %    IMMATURE GRANULOCYTES 1 (H) 0 - 0.5 %    ABS. NEUTROPHILS 17.1 (H) 1.8 - 8.0 K/UL    ABS. LYMPHOCYTES 2.2 0.8 - 3.5 K/UL    ABS. MONOCYTES 0.3 0.0 - 1.0 K/UL    ABS. EOSINOPHILS 0.1 0.0 - 0.4 K/UL    ABS. BASOPHILS 0.1 0.0 - 0.1 K/UL    ABS. IMM.  GRANS. 0.3 (H) 0.00 - 0.04 K/UL    DF AUTOMATED     RENAL FUNCTION PANEL    Collection Time: 02/28/22  5:34 AM   Result Value Ref Range    Sodium 146 (H) 136 - 145 mmol/L    Potassium 4.0 3.5 - 5.1 mmol/L    Chloride 112 (H) 97 - 108 mmol/L    CO2 30 21 - 32 mmol/L    Anion gap 4 (L) 5 - 15 mmol/L    Glucose 194 (H) 65 - 100 mg/dL    BUN 53 (H) 6 - 20 mg/dL    Creatinine 1.76 (H) 0.55 - 1.02 mg/dL    BUN/Creatinine ratio 30 (H) 12 - 20      GFR est AA 34 (L) >60 ml/min/1.73m2    GFR est non-AA 28 (L) >60 ml/min/1.73m2    Calcium 8.5 8.5 - 10.1 mg/dL    Phosphorus 2.6 2.6 - 4.7 mg/dL    Albumin 1.7 (L) 3.5 - 5.0 g/dL   METABOLIC PANEL, COMPREHENSIVE    Collection Time: 02/28/22  5:34 AM   Result Value Ref Range    Sodium 146 (H) 136 - 145 mmol/L    Potassium 4.0 3.5 - 5.1 mmol/L    Chloride 110 (H) 97 - 108 mmol/L    CO2 30 21 - 32 mmol/L    Anion gap 6 5 - 15 mmol/L    Glucose 191 (H) 65 - 100 mg/dL    BUN 54 (H) 6 - 20 mg/dL    Creatinine 1.78 (H) 0.55 - 1.02 mg/dL    BUN/Creatinine ratio 30 (H) 12 - 20      GFR est AA 34 (L) >60 ml/min/1.73m2    GFR est non-AA 28 (L) >60 ml/min/1.73m2    Calcium 8.4 (L) 8.5 - 10.1 mg/dL    Bilirubin, total 0.3 0.2 - 1.0 mg/dL    AST (SGOT) 317 (H) 15 - 37 U/L    ALT (SGPT) 1,570 (H) 12 - 78 U/L    Alk. phosphatase 151 (H) 45 - 117 U/L    Protein, total 6.0 (L) 6.4 - 8.2 g/dL    Albumin 1.7 (L) 3.5 - 5.0 g/dL    Globulin 4.3 (H) 2.0 - 4.0 g/dL    A-G Ratio 0.4 (L) 1.1 - 2.2     HEPATITIS PANEL, ACUTE    Collection Time: 02/28/22  5:34 AM   Result Value Ref Range    Hepatitis A, IgM NONREACTIVE NONREACTIVE      __        Hepatitis B surface Ag <0.10 Index    Hep B surface Ag Interp. Negative Negative    __        Hepatitis B core, IgM NONREACTIVE NONREACTIVE    __        Hepatitis C virus Ab >11.00 Index    Hep C virus Ab Interp.  Reactive (A) NONREACTIVE     PROTHROMBIN TIME + INR    Collection Time: 02/28/22  5:34 AM   Result Value Ref Range    Prothrombin time 16.3 (H) 11.9 - 14.6 sec    INR 1.4 (H) 0.9 - 1.1     GLUCOSE, POC    Collection Time: 02/28/22  6:56 AM   Result Value Ref Range    Glucose (POC) 203 (H) 65 - 117 mg/dL    Performed by [address] [place] [address], POC    Collection Time: 02/28/22 11:02 AM   Result Value Ref Range    Glucose (POC) 272 (H) 65 - 117 mg/dL    Performed by [address] [place] [address], POC    Collection Time: 02/28/22  3:19 PM   Result Value Ref Range    Glucose (POC) 279 (H) 65 - 117 mg/dL    Performed by Mu Zafar Assessment:     Active Problems:    CVA (cerebral vascular accident) ([facility] [address] (2/21/2022)      Elevated troponin (2/21/2022)    78years old with multiorgan failure prognosis is not good and this was discussed extensively with daughter Jimena Fernandez who acknowledges understanding will pass this on to family members    Plan:     Critical care time of 60 minutes  Continue with present treatment patient may need PEG tube down the line this was discussed with the patient daughter    Signed By: Zion Stephens MD     February 28, 2022

## 2022-02-28 NOTE — CONSULTS
UROLOGY CONSULT NOTE  [phone]        Patient: Manjula Guerrero MRN: [id number]  SSN: [id number]    YOB: 1945  Age: 78 y.o. Sex: female      REFERRING PROVIDER  Subjective:      Manjula Guerrero is a 78 y.o. female who is being seen in urologic consultation for for urinary tract infection with gram-negative rods and hematuria--see my assitants note-Slime Nicole from today, she wrote on my behalf.     Past Medical History:   Diagnosis Date    Diabetes mellitus ([address]     HTN (hypertension)     Hyperlipidemia     Neuropathy     PAD (peripheral artery disease) (HCC)     PCI    Sciatica      Past Surgical History:   Procedure Laterality Date    HX AMPUTATION Right     great toe    HX CERVICAL FUSION      HX OTHER SURGICAL Bilateral     Stent placement    HX OTHER SURGICAL      HX VASCULAR STENT Bilateral     2 in right 1 in left    HX VASCULAR STENT Bilateral       Family History   Problem Relation Age of Onset    Cancer Mother     Diabetes Mother     Hypertension Mother      Social History     Tobacco Use    Smoking status: Never Smoker    Smokeless tobacco: Never Used   Substance Use Topics    Alcohol use: Not Currently      Current Facility-Administered Medications   Medication Dose Route Frequency Provider Last Rate Last Admin    piperacillin-tazobactam (ZOSYN) 3.375 g in 0.9% sodium chloride (MBP/ADV) 100 mL MBP  3.375 g IntraVENous Q8H Fred Barnes MD 25 mL/hr at 02/28/22 1311 3.375 g at 02/28/22 1311    0.9% sodium chloride infusion 250 mL  250 mL IntraVENous PRN Mohiuddin, Gibson Babinski, MD        insulin lispro (HUMALOG) injection   SubCUTAneous Q4H Israel Soto MD   9 Units at 02/28/22 1522    latanoprost (XALATAN) 0.005 % ophthalmic solution 1 Drop  1 Drop Right Eye QPM Israel Soto MD   1 Drop at 02/27/22 1918    gabapentin (NEURONTIN) capsule 100 mg  100 mg Oral DAILY Allegra JACKSON MD   100 mg at 02/28/22 0800    0.9% sodium chloride infusion  50 mL/hr IntraVENous CONTINUOUS Trenton Harrington MD 50 mL/hr at 02/28/22 0535 50 mL/hr at 02/28/22 0535    0.9% sodium chloride infusion 250 mL  250 mL IntraVENous PRN Ivan Louise MD        zinc oxide-white petrolatum 20-75 % topical paste   Topical BID Monika Marcus MD   Given at 02/28/22 0900    lidocaine (XYLOCAINE) 4 % (40 mg/mL) topical solution   Topical PRN Radha JACKSON MD   Given at 02/26/22 2207    [Held by provider] rivaroxaban (Barbi Megan) tablet 2.5 mg  2.5 mg Oral BID WITH MEALS Elvie Sahni MD   2.5 mg at 02/24/22 1720    [Held by provider] atorvastatin (LIPITOR) tablet 80 mg  80 mg Oral DAILY Elvie Sahni MD   80 mg at 02/26/22 0900    labetaloL (NORMODYNE;TRANDATE) 20 mg/4 mL (5 mg/mL) injection 10 mg  10 mg IntraVENous Q6H PRN Elvie Sahni MD        sodium chloride (NS) flush 5-40 mL  5-40 mL IntraVENous Q8H Monika Marcus MD   10 mL at 02/28/22 1313    sodium chloride (NS) flush 5-40 mL  5-40 mL IntraVENous PRN Radha JACKSON MD        brimonidine (ALPHAGAN) 0.2 % ophthalmic solution 1 Drop  1 Drop Both Eyes TID Monika Marcus MD   1 Drop at 02/28/22 1522    ferrous sulfate tablet 325 mg  325 mg Oral BID Radha JACKSON MD   325 mg at 02/28/22 0800    glucose chewable tablet 16 g  4 Tablet Oral PRN Monika Marcus MD        glucagon (GLUCAGEN) injection 1 mg  1 mg IntraMUSCular PRN Radha JACKSON MD        dextrose 10% infusion 125-250 mL  125-250 mL IntraVENous PRN Radha JACKSON MD        metoprolol succinate (TOPROL-XL) XL tablet 50 mg  50 mg Oral DAILY Brenda Maldonado MD   50 mg at 02/28/22 0800    amLODIPine (NORVASC) tablet 10 mg  10 mg Oral DAILY Brenda Maldonado MD   10 mg at 02/28/22 0800        No Known Allergies    Review of Systems:  ROS    Objective:     Vitals:    02/28/22 1300 02/28/22 1400 02/28/22 1500 02/28/22 1601   BP: (!) 141/62 (!) 157/50 (!) 140/98 (!) 137/47   Pulse: 72 73 71 69   Resp: 16 20 17 19   Temp:       SpO2: 100% 100% 100% 100%   Weight:       Height:            Recent Labs     02/28/22  0534 02/27/22  0525 02/26/22  0400   WBC 20.0* 22.5* 22.3*   HGB 7.4* 6.7* 7.5*   HCT 22.4* 19.7* 22.8*    349 350     Recent Labs     02/28/22  0534 02/27/22  0525 02/26/22  0400   *  146* 139 139   K 4.0  4.0 3.9 4.4   *  112* 106 106   CO2 30  30 27 26   *  194* 334* 224*   BUN 54*  53* 78* 76*   CREA 1.78*  1.76* 2.61* 3.09*   CA 8.4*  8.5 8.0* 8.1*   PHOS 2.6 3.3 5.3*   ALB 1.7*  1.7* 1.7* 2.0*   TBILI 0.3 0.4  --    ALT 1,570* 2,657*  --    INR 1.4*  --   --      No results for input(s): PH, PCO2, PO2, HCO3, FIO2 in the last 72 hours. 24 Hour Results:  Recent Results (from the past 24 hour(s))   GLUCOSE, POC    Collection Time: 02/27/22  7:19 PM   Result Value Ref Range    Glucose (POC) 291 (H) 65 - 117 mg/dL    Performed by Terry Murdock    GLUCOSE, POC    Collection Time: 02/27/22 11:27 PM   Result Value Ref Range    Glucose (POC) 265 (H) 65 - 117 mg/dL    Performed by [address], POC    Collection Time: 02/28/22  3:44 AM   Result Value Ref Range    Glucose (POC) 215 (H) 65 - 117 mg/dL    Performed by Cathie Mcdonough    CBC WITH AUTOMATED DIFF    Collection Time: 02/28/22  5:34 AM   Result Value Ref Range    WBC 20.0 (H) 3.6 - 11.0 K/uL    RBC 2.55 (L) 3.80 - 5.20 M/uL    HGB 7.4 (L) 11.5 - 16.0 g/dL    HCT 22.4 (L) 35.0 - 47.0 %    MCV 87.8 80.0 - 99.0 FL    MCH 29.0 26.0 - 34.0 PG    MCHC 33.0 30.0 - 36.5 g/dL    RDW 16.1 (H) 11.5 - 14.5 %    PLATELET 755 839 - 904 K/uL    MPV 11.3 8.9 - 12.9 FL    NRBC 2.0 (H) 0.0  WBC    ABSOLUTE NRBC 0.40 (H) 0.00 - 0.01 K/uL    NEUTROPHILS 85 (H) 32 - 75 %    LYMPHOCYTES 11 (L) 12 - 49 %    MONOCYTES 2 (L) 5 - 13 %    EOSINOPHILS 1 0 - 7 %    BASOPHILS 0 0 - 1 %    IMMATURE GRANULOCYTES 1 (H) 0 - 0.5 %    ABS. NEUTROPHILS 17.1 (H) 1.8 - 8.0 K/UL    ABS. LYMPHOCYTES 2.2 0.8 - 3.5 K/UL    ABS. MONOCYTES 0.3 0.0 - 1.0 K/UL    ABS. EOSINOPHILS 0.1 0.0 - 0.4 K/UL    ABS. BASOPHILS 0.1 0.0 - 0.1 K/UL    ABS. IMM. GRANS. 0.3 (H) 0.00 - 0.04 K/UL    DF AUTOMATED     RENAL FUNCTION PANEL    Collection Time: 02/28/22  5:34 AM   Result Value Ref Range    Sodium 146 (H) 136 - 145 mmol/L    Potassium 4.0 3.5 - 5.1 mmol/L    Chloride 112 (H) 97 - 108 mmol/L    CO2 30 21 - 32 mmol/L    Anion gap 4 (L) 5 - 15 mmol/L    Glucose 194 (H) 65 - 100 mg/dL    BUN 53 (H) 6 - 20 mg/dL    Creatinine 1.76 (H) 0.55 - 1.02 mg/dL    BUN/Creatinine ratio 30 (H) 12 - 20      GFR est AA 34 (L) >60 ml/min/1.73m2    GFR est non-AA 28 (L) >60 ml/min/1.73m2    Calcium 8.5 8.5 - 10.1 mg/dL    Phosphorus 2.6 2.6 - 4.7 mg/dL    Albumin 1.7 (L) 3.5 - 5.0 g/dL   METABOLIC PANEL, COMPREHENSIVE    Collection Time: 02/28/22  5:34 AM   Result Value Ref Range    Sodium 146 (H) 136 - 145 mmol/L    Potassium 4.0 3.5 - 5.1 mmol/L    Chloride 110 (H) 97 - 108 mmol/L    CO2 30 21 - 32 mmol/L    Anion gap 6 5 - 15 mmol/L    Glucose 191 (H) 65 - 100 mg/dL    BUN 54 (H) 6 - 20 mg/dL    Creatinine 1.78 (H) 0.55 - 1.02 mg/dL    BUN/Creatinine ratio 30 (H) 12 - 20      GFR est AA 34 (L) >60 ml/min/1.73m2    GFR est non-AA 28 (L) >60 ml/min/1.73m2    Calcium 8.4 (L) 8.5 - 10.1 mg/dL    Bilirubin, total 0.3 0.2 - 1.0 mg/dL    AST (SGOT) 317 (H) 15 - 37 U/L    ALT (SGPT) 1,570 (H) 12 - 78 U/L    Alk. phosphatase 151 (H) 45 - 117 U/L    Protein, total 6.0 (L) 6.4 - 8.2 g/dL    Albumin 1.7 (L) 3.5 - 5.0 g/dL    Globulin 4.3 (H) 2.0 - 4.0 g/dL    A-G Ratio 0.4 (L) 1.1 - 2.2     HEPATITIS PANEL, ACUTE    Collection Time: 02/28/22  5:34 AM   Result Value Ref Range    Hepatitis A, IgM NONREACTIVE NONREACTIVE      __        Hepatitis B surface Ag <0.10 Index    Hep B surface Ag Interp. Negative Negative    __        Hepatitis B core, IgM NONREACTIVE NONREACTIVE    __        Hepatitis C virus Ab >11.00 Index    Hep C virus Ab Interp.  Reactive (A) NONREACTIVE     PROTHROMBIN TIME + INR    Collection Time: 02/28/22  5:34 AM   Result Value Ref Range    Prothrombin time 16.3 (H) 11.9 - 14.6 sec    INR 1.4 (H) 0.9 - 1.1     GLUCOSE, POC    Collection Time: 02/28/22  6:56 AM   Result Value Ref Range    Glucose (POC) 203 (H) 65 - 117 mg/dL    Performed by [address] [place] [address], POC    Collection Time: 02/28/22 11:02 AM   Result Value Ref Range    Glucose (POC) 272 (H) 65 - 117 mg/dL    Performed by [address] [place] [address], POC    Collection Time: 02/28/22  3:19 PM   Result Value Ref Range    Glucose (POC) 279 (H) 65 - 117 mg/dL    Performed by Vidhya Plascencia        Physical Exam:  Physical Exam      Assessment:     Hospital Problems  Date Reviewed: 11/8/2021          Codes Class Noted POA    CVA (cerebral vascular accident) [facility]) ICD-10-CM: I63.9  ICD-9-CM: 434.91  2/21/2022 Unknown        Elevated troponin ICD-10-CM: R77.8  ICD-9-CM: 790.6  2/21/2022 Unknown              Plan:     Hospital Problems  Date Reviewed: 11/8/2021          Codes Class Noted POA    CVA (cerebral vascular accident) [facility]) ICD-10-CM: I63.9  ICD-9-CM: 434.91  2/21/2022 Unknown        Elevated troponin ICD-10-CM: R77.8  ICD-9-CM: 790.6  2/21/2022 Unknown              Signed By: Ryland Lockhart MD     February 28, 2022

## 2022-02-28 NOTE — PROGRESS NOTES
Renal Daily Progress Note    Admit Date: 2/21/2022      Subjective:   No history elicitable from the patient. She opens her eyes when called but does not answer questions.       Current Facility-Administered Medications   Medication Dose Route Frequency    0.9% sodium chloride infusion 250 mL  250 mL IntraVENous PRN    insulin lispro (HUMALOG) injection   SubCUTAneous Q4H    latanoprost (XALATAN) 0.005 % ophthalmic solution 1 Drop  1 Drop Right Eye QPM    gabapentin (NEURONTIN) capsule 100 mg  100 mg Oral DAILY    0.9% sodium chloride infusion  50 mL/hr IntraVENous CONTINUOUS    0.9% sodium chloride infusion 250 mL  250 mL IntraVENous PRN    zinc oxide-white petrolatum 20-75 % topical paste   Topical BID    cefTRIAXone (ROCEPHIN) 1 g in sterile water (preservative free) 10 mL IV syringe  1 g IntraVENous Q24H    lidocaine (XYLOCAINE) 4 % (40 mg/mL) topical solution   Topical PRN    [Held by provider] rivaroxaban (XARELTO) tablet 2.5 mg  2.5 mg Oral BID WITH MEALS    [Held by provider] atorvastatin (LIPITOR) tablet 80 mg  80 mg Oral DAILY    labetaloL (NORMODYNE;TRANDATE) 20 mg/4 mL (5 mg/mL) injection 10 mg  10 mg IntraVENous Q6H PRN    sodium chloride (NS) flush 5-40 mL  5-40 mL IntraVENous Q8H    sodium chloride (NS) flush 5-40 mL  5-40 mL IntraVENous PRN    brimonidine (ALPHAGAN) 0.2 % ophthalmic solution 1 Drop  1 Drop Both Eyes TID    ferrous sulfate tablet 325 mg  325 mg Oral BID    glucose chewable tablet 16 g  4 Tablet Oral PRN    glucagon (GLUCAGEN) injection 1 mg  1 mg IntraMUSCular PRN    dextrose 10% infusion 125-250 mL  125-250 mL IntraVENous PRN    metoprolol succinate (TOPROL-XL) XL tablet 50 mg  50 mg Oral DAILY    amLODIPine (NORVASC) tablet 10 mg  10 mg Oral DAILY        Review of Systems    ROS   Not obtainable    Objective:     Patient Vitals for the past 8 hrs:   BP Temp Pulse Resp SpO2   02/28/22 1200 (!) 135/39 -- 70 15 100 %   02/28/22 1100 (!) 135/51 100.1 °F (37.8 °C) 69 17 99 %   02/28/22 1000 (!) 192/76 -- 78 20 100 %   02/28/22 0900 (!) 173/52 -- 68 15 100 %   02/28/22 0800 (!) 166/47 -- 73 15 100 %   02/28/22 0711 -- -- -- -- 100 %   02/28/22 0700 (!) 153/53 99.3 °F (37.4 °C) 73 16 100 %   02/28/22 0600 (!) 162/47 -- 76 16 100 %   02/28/22 0500 (!) 159/43 -- 73 16 100 %     02/28 0701 - 02/28 1900  In: -   Out: 6900 [OVRJR:4151]  02/26 1901 - 02/28 0700  In: 76507.5 [I.V.:1007.5]  Out: 84826 [Urine:95325]    Physical Exam:   Physical Exam  Cardiovascular:      Rate and Rhythm: Regular rhythm. Pulmonary:      Breath sounds: Normal breath sounds. Abdominal:      General: Bowel sounds are normal.      Palpations: Abdomen is soft. Trace edema  She would open her eyes but not following commands. She continues on CBI. DUPLEX UPPER EXT VENOUS RIGHT   Final Result      US ABD COMP   Final Result   Gallbladder sludge without other acute abnormality. XR CHEST PORT   Final Result   The cardiomediastinal silhouette is appropriate for age, technique,   and lung expansion. Pulmonary vasculature is not congested. The lungs are   essentially clear. No effusion or pneumothorax is seen. CT HEAD WO CONT   Final Result   Negative for acute intracranial process. CT ABD PELV WO CONT   Final Result   Hemorrhage within the urinary bladder. Mild left   hydroureteronephrosis. XR SWALLOW FUNC VIDEO   Final Result   Penetration with thin consistency. No aspiration. CTA HEAD NECK W WO CONT   Final Result   Negative head and neck CTA. Please note that all carotid bifurcation stenoses are measured as per NASCET   criteria. CT CODE NEURO HEAD WO CONTRAST   Final Result   1. No acute large vessel intracranial ischemia, hemorrhage. Called report to   patient's nurse Lona Vickers at 7:12 PM 2/22/2022.   2. Right basal ganglia lacunar infarct. 3. Periventricular microangiopathy. 4. Pansinusitis. See above.       MRI BRAIN WO CONT   Final Result   Acute infarction left paracentral lobule and region of the white   matter of the left motor cortex. CT CODE NEURO HEAD WO CONTRAST   Final Result      1. No acute intracranial hemorrhage. 2. Left basal ganglia lacunar infarct, which is age indeterminate in the absence   of any prior outside studies, but favored to be chronic. 3. Moderate to severe paranasal sinus disease. Results called to Dr. Cam Rodriguez at 9:39 PM on 2/21/2022           Data Review   Recent Labs     02/28/22 0534 02/27/22  0525 02/26/22  0400   WBC 20.0* 22.5* 22.3*   HGB 7.4* 6.7* 7.5*   HCT 22.4* 19.7* 22.8*    349 350     Recent Labs     02/28/22 0534 02/27/22  0525 02/26/22  0400   *  146* 139 139   K 4.0  4.0 3.9 4.4   *  112* 106 106   CO2 30  30 27 26   *  194* 334* 224*   BUN 54*  53* 78* 76*   CREA 1.78*  1.76* 2.61* 3.09*   CA 8.4*  8.5 8.0* 8.1*   PHOS 2.6 3.3 5.3*   ALB 1.7*  1.7* 1.7* 2.0*   ALT 1,570* 2,657*  --    INR 1.4*  --   --      No components found for: GLPOC  No results for input(s): PH, PCO2, PO2, HCO3, FIO2 in the last 72 hours. Recent Labs     02/28/22  0534   INR 1.4*         Assessment:           Active Problems:    CVA (cerebral vascular accident) ([facility] [address] (2/21/2022)      Elevated troponin (2/21/2022)    Acute kidney injury improving. Creatinine 1.78 mg today down from 3.09 mg over the weekend. Wonder if she has underlying CKD. Modest hypernatremia    Elevated transaminases- improving -  GI following    Normocytic anemia    Acute CVA    Diabetes mellitus with peripheral neuropathy    Hematuria on CBI as per urology    Plan:   Unable to quantitate urine output with CBI. Improving creat is encouraging. Could expect further improvement in renal function. D/W  pts daughter in law .

## 2022-02-28 NOTE — PROGRESS NOTES
Progress Note    Patient: Savana Singletary MRN: [id number]  SSN: [id number]    YOB: 1945  Age: 78 y.o.   Sex: female      Admit Date: 2/21/2022    LOS: 3 days     Subjective:   Pt seen and examined  Family member in  the room,   No further seizure activity      Past Medical History:   Diagnosis Date    Diabetes mellitus ([address]     HTN (hypertension)     Hyperlipidemia     Neuropathy     PAD (peripheral artery disease) (HCC)     PCI    Sciatica         Current Facility-Administered Medications:     0.9% sodium chloride infusion 250 mL, 250 mL, IntraVENous, PRN, Beverly Soto MD    insulin lispro (HUMALOG) injection, , SubCUTAneous, Q4H, Israel Soto MD, 9 Units at 02/28/22 1120    latanoprost (XALATAN) 0.005 % ophthalmic solution 1 Drop, 1 Drop, Right Eye, QPM, Israel Soto MD, 1 Drop at 02/27/22 1918    gabapentin (NEURONTIN) capsule 100 mg, 100 mg, Oral, DAILY, Orestes Norman MD, 100 mg at 02/28/22 0800    0.9% sodium chloride infusion, 50 mL/hr, IntraVENous, CONTINUOUS, Michael Louise MD, Last Rate: 50 mL/hr at 02/28/22 0535, 50 mL/hr at 02/28/22 0535    0.9% sodium chloride infusion 250 mL, 250 mL, IntraVENous, PRN, Michael Louise MD    zinc oxide-white petrolatum 20-75 % topical paste, , Topical, BID, Zayda Bibi JACKSON MD, Given at 02/28/22 0900    cefTRIAXone (ROCEPHIN) 1 g in sterile water (preservative free) 10 mL IV syringe, 1 g, IntraVENous, Q24H, Orestes Norman MD, 1 g at 02/27/22 1641    lidocaine (XYLOCAINE) 4 % (40 mg/mL) topical solution, , Topical, PRN, Zayda Bibi JACKSON MD, Given at 02/26/22 2207    [Held by provider] rivaroxaban (XARELTO) tablet 2.5 mg, 2.5 mg, Oral, BID WITH MEALS, Roxane Oden MD, 2.5 mg at 02/24/22 1720    [Held by provider] atorvastatin (LIPITOR) tablet 80 mg, 80 mg, Oral, DAILY, Roxane Oden MD, 80 mg at 02/26/22 0900    labetaloL (NORMODYNE;TRANDATE) 20 mg/4 mL (5 mg/mL) injection 10 mg, 10 mg, IntraVENous, Q6H PRN, Erasmo Murray MD    sodium chloride (NS) flush 5-40 mL, 5-40 mL, IntraVENous, Q8H, Orestes Norman MD, 10 mL at 02/28/22 0601    sodium chloride (NS) flush 5-40 mL, 5-40 mL, IntraVENous, PRN, Ely JACKSON MD    brimonidine (ALPHAGAN) 0.2 % ophthalmic solution 1 Drop, 1 Drop, Both Eyes, TID, Orestes Norman MD, 1 Drop at 02/28/22 0801    ferrous sulfate tablet 325 mg, 325 mg, Oral, BID, Orestes Norman MD, 325 mg at 02/28/22 0800    glucose chewable tablet 16 g, 4 Tablet, Oral, PRN, Lars Unger MD    glucagon (GLUCAGEN) injection 1 mg, 1 mg, IntraMUSCular, PRN, Ely JACKSON MD    dextrose 10% infusion 125-250 mL, 125-250 mL, IntraVENous, PRN, Ely JACKSON MD    metoprolol succinate (TOPROL-XL) XL tablet 50 mg, 50 mg, Oral, DAILY, Candy Lundy MD, 50 mg at 02/28/22 0800    amLODIPine (NORVASC) tablet 10 mg, 10 mg, Oral, DAILY, Candy Lundy MD, 10 mg at 02/28/22 0800    Objective:     Vitals:    02/28/22 0900 02/28/22 1000 02/28/22 1100 02/28/22 1200   BP: (!) 173/52 (!) 192/76 (!) 135/51 (!) 135/39   Pulse: 68 78 69 70   Resp: 15 20 17 15   Temp:   100.1 °F (37.8 °C)    SpO2: 100% 100% 99% 100%   Weight:       Height:            Intake and Output:  Current Shift: 02/28 0701 - 02/28 1900  In: -   Out: 6095 [Urine:6900]  Last three shifts: 02/26 1901 - 02/28 0700  In: 14918.5 [I.V.:1007.5]  Out: 37333 [Urine:59079]    Physical Exam:   Physical Exam  Constitutional:       Appearance: She is ill-appearing. HENT:      Head: Atraumatic. Mouth/Throat:      Mouth: Mucous membranes are dry. Eyes:      General: No scleral icterus. Cardiovascular:      Rate and Rhythm: Regular rhythm. Pulses: Normal pulses. Pulmonary:      Effort: Pulmonary effort is normal.   Abdominal:      Tenderness: There is no abdominal tenderness. Musculoskeletal:         General: Normal range of motion. Cervical back: Normal range of motion. Skin:     General: Skin is warm. Neurological:      General: No focal deficit present. Motor: No weakness. Gait: Gait normal.          Lab/Data Review:  Recent Results (from the past 24 hour(s))   GLUCOSE, POC    Collection Time: 02/27/22  4:27 PM   Result Value Ref Range    Glucose (POC) 306 (H) 65 - 117 mg/dL    Performed by Terry Murdock    GLUCOSE, POC    Collection Time: 02/27/22  7:19 PM   Result Value Ref Range    Glucose (POC) 291 (H) 65 - 117 mg/dL    Performed by Terry Murdock    GLUCOSE, POC    Collection Time: 02/27/22 11:27 PM   Result Value Ref Range    Glucose (POC) 265 (H) 65 - 117 mg/dL    Performed by [facility]    GLUCOSE, POC    Collection Time: 02/28/22  3:44 AM   Result Value Ref Range    Glucose (POC) 215 (H) 65 - 117 mg/dL    Performed by [facility]    CBC WITH AUTOMATED DIFF    Collection Time: 02/28/22  5:34 AM   Result Value Ref Range    WBC 20.0 (H) 3.6 - 11.0 K/uL    RBC 2.55 (L) 3.80 - 5.20 M/uL    HGB 7.4 (L) 11.5 - 16.0 g/dL    HCT 22.4 (L) 35.0 - 47.0 %    MCV 87.8 80.0 - 99.0 FL    MCH 29.0 26.0 - 34.0 PG    MCHC 33.0 30.0 - 36.5 g/dL    RDW 16.1 (H) 11.5 - 14.5 %    PLATELET 354 889 - 828 K/uL    MPV 11.3 8.9 - 12.9 FL    NRBC 2.0 (H) 0.0  WBC    ABSOLUTE NRBC 0.40 (H) 0.00 - 0.01 K/uL    NEUTROPHILS 85 (H) 32 - 75 %    LYMPHOCYTES 11 (L) 12 - 49 %    MONOCYTES 2 (L) 5 - 13 %    EOSINOPHILS 1 0 - 7 %    BASOPHILS 0 0 - 1 %    IMMATURE GRANULOCYTES 1 (H) 0 - 0.5 %    ABS. NEUTROPHILS 17.1 (H) 1.8 - 8.0 K/UL    ABS. LYMPHOCYTES 2.2 0.8 - 3.5 K/UL    ABS. MONOCYTES 0.3 0.0 - 1.0 K/UL    ABS. EOSINOPHILS 0.1 0.0 - 0.4 K/UL    ABS. BASOPHILS 0.1 0.0 - 0.1 K/UL    ABS. IMM.  GRANS. 0.3 (H) 0.00 - 0.04 K/UL    DF AUTOMATED     RENAL FUNCTION PANEL    Collection Time: 02/28/22  5:34 AM   Result Value Ref Range    Sodium 146 (H) 136 - 145 mmol/L    Potassium 4.0 3.5 - 5.1 mmol/L    Chloride 112 (H) 97 - 108 mmol/L    CO2 30 21 - 32 mmol/L    Anion gap 4 (L) 5 - 15 mmol/L    Glucose 194 (H) 65 - 100 mg/dL    BUN 53 (H) 6 - 20 mg/dL    Creatinine 1.76 (H) 0.55 - 1.02 mg/dL    BUN/Creatinine ratio 30 (H) 12 - 20      GFR est AA 34 (L) >60 ml/min/1.73m2    GFR est non-AA 28 (L) >60 ml/min/1.73m2    Calcium 8.5 8.5 - 10.1 mg/dL    Phosphorus 2.6 2.6 - 4.7 mg/dL    Albumin 1.7 (L) 3.5 - 5.0 g/dL   METABOLIC PANEL, COMPREHENSIVE    Collection Time: 02/28/22  5:34 AM   Result Value Ref Range    Sodium 146 (H) 136 - 145 mmol/L    Potassium 4.0 3.5 - 5.1 mmol/L    Chloride 110 (H) 97 - 108 mmol/L    CO2 30 21 - 32 mmol/L    Anion gap 6 5 - 15 mmol/L    Glucose 191 (H) 65 - 100 mg/dL    BUN 54 (H) 6 - 20 mg/dL    Creatinine 1.78 (H) 0.55 - 1.02 mg/dL    BUN/Creatinine ratio 30 (H) 12 - 20      GFR est AA 34 (L) >60 ml/min/1.73m2    GFR est non-AA 28 (L) >60 ml/min/1.73m2    Calcium 8.4 (L) 8.5 - 10.1 mg/dL    Bilirubin, total 0.3 0.2 - 1.0 mg/dL    AST (SGOT) 317 (H) 15 - 37 U/L    ALT (SGPT) 1,570 (H) 12 - 78 U/L    Alk. phosphatase 151 (H) 45 - 117 U/L    Protein, total 6.0 (L) 6.4 - 8.2 g/dL    Albumin 1.7 (L) 3.5 - 5.0 g/dL    Globulin 4.3 (H) 2.0 - 4.0 g/dL    A-G Ratio 0.4 (L) 1.1 - 2.2     HEPATITIS PANEL, ACUTE    Collection Time: 02/28/22  5:34 AM   Result Value Ref Range    Hepatitis A, IgM NONREACTIVE NONREACTIVE      __        Hepatitis B surface Ag <0.10 Index    Hep B surface Ag Interp. Negative Negative    __        Hepatitis B core, IgM NONREACTIVE NONREACTIVE    __        Hepatitis C virus Ab >11.00 Index    Hep C virus Ab Interp.  Reactive (A) NONREACTIVE     PROTHROMBIN TIME + INR    Collection Time: 02/28/22  5:34 AM   Result Value Ref Range    Prothrombin time 16.3 (H) 11.9 - 14.6 sec    INR 1.4 (H) 0.9 - 1.1     GLUCOSE, POC    Collection Time: 02/28/22  6:56 AM   Result Value Ref Range    Glucose (POC) 203 (H) 65 - 117 mg/dL    Performed by [address] [place] [address], POC    Collection Time: 02/28/22 11:02 AM   Result Value Ref Range    Glucose (POC) 272 (H) 65 - 117 mg/dL Performed by Elaina Larios         DUPLEX UPPER EXT VENOUS RIGHT   Final Result      US ABD COMP   Final Result   Gallbladder sludge without other acute abnormality. XR CHEST PORT   Final Result   The cardiomediastinal silhouette is appropriate for age, technique,   and lung expansion. Pulmonary vasculature is not congested. The lungs are   essentially clear. No effusion or pneumothorax is seen. CT HEAD WO CONT   Final Result   Negative for acute intracranial process. CT ABD PELV WO CONT   Final Result   Hemorrhage within the urinary bladder. Mild left   hydroureteronephrosis. XR SWALLOW FUNC VIDEO   Final Result   Penetration with thin consistency. No aspiration. CTA HEAD NECK W WO CONT   Final Result   Negative head and neck CTA. Please note that all carotid bifurcation stenoses are measured as per NASCET   criteria. CT CODE NEURO HEAD WO CONTRAST   Final Result   1. No acute large vessel intracranial ischemia, hemorrhage. Called report to   patient's nurse Darrick Malcolm at 7:12 PM 2/22/2022.   2. Right basal ganglia lacunar infarct. 3. Periventricular microangiopathy. 4. Pansinusitis. See above. MRI BRAIN WO CONT   Final Result   Acute infarction left paracentral lobule and region of the white   matter of the left motor cortex. CT CODE NEURO HEAD WO CONTRAST   Final Result      1. No acute intracranial hemorrhage. 2. Left basal ganglia lacunar infarct, which is age indeterminate in the absence   of any prior outside studies, but favored to be chronic. 3. Moderate to severe paranasal sinus disease.       Results called to Dr. Aniket Hyatt at 9:39 PM on 2/21/2022           Assessment:     Active Problems:    CVA (cerebral vascular accident) ([facility] (2/21/2022)      Elevated troponin (2/21/2022)         Elevated of transaminase, no documented history of hepatic disease, hepatitis,  had a seizure yesterday, likely patient had rhabdomyolysis secondary to seizure disorders    transaminase down  History of CVA, seisure  Urosepsis, on multiple antibiotic  Hb drop some     IV hydrations, follow renal function closely  Continue NG tube feeding  Continue on ceftriaxone for UTI s  Follow the liver function test in the morning    Discussed with patient family member         Signed By: Jono Sharif MD     February 28, 2022        Thank you for allowing me to participate in this patients care  Cc Referring Physician   Lance Brumfield MD

## 2022-02-28 NOTE — PROGRESS NOTES
CM reviewed clinical chart. Discharge disposition is home with home health services provided by [facility]. CM will continue to follow.

## 2022-02-28 NOTE — PROGRESS NOTES
UROLOGY Progress Note         [phone]      Daily Progress Note: 2/28/2022      Subjective: The patient is seen for UROLOGIC follow up for for urinary tract infection and gross hematuria  Patient is a 28-year-old female with history of high blood pressure, diabetes, recent stroke(about 1 week ago) who has been admitted to the hospital  with altered mental status and slurred speech. She was  on blood thinners Xarelto and aspirin. Today, the patient responds to touch and voice, not verbal.    CT scan  from 2/25/2022  IMPRESSION  Hemorrhage within the urinary bladder. Mild left  hydroureteronephrosis.     Problem List:  Patient Active Problem List   Diagnosis Code    HTN (hypertension) I10    Hyperlipidemia E78.5    Neuropathy G62.9    Sciatica M54.30    Diabetes mellitus with neurological manifestations, uncontrolled ([facility] [address] E11.49, E11.65    Infection of nailbed of toe of left foot L03.032    PAD [facility]peripheral artery disease) ([facility]) I73.9    Hypercalcemia E83.52    DM type 2 causing vascular disease ([facility] E11.59    Type 2 diabetes mellitus with nephropathy ([facility]) E11.21    Acute osteomyelitis of ankle or foot ([facility] M86.179    Diabetic peripheral neuropathy ([facility]) E11.42    Spinal stenosis in cervical region M48.02    Ischemia of both lower extremities I99.8    Pain in both lower legs M79.661, M79.662    CVA (cerebral vascular accident) ([facility] I63.9    Elevated troponin R77.8         Medications reviewed  Current Facility-Administered Medications   Medication Dose Route Frequency    piperacillin-tazobactam (ZOSYN) 3.375 g in 0.9% sodium chloride (MBP/ADV) 100 mL MBP  3.375 g IntraVENous Q8H    0.9% sodium chloride infusion 250 mL  250 mL IntraVENous PRN    insulin lispro (HUMALOG) injection   SubCUTAneous Q4H    latanoprost (XALATAN) 0.005 % ophthalmic solution 1 Drop  1 Drop Right Eye QPM    gabapentin (NEURONTIN) capsule 100 mg  100 mg Oral DAILY    0.9% sodium chloride infusion  50 mL/hr IntraVENous CONTINUOUS    0.9% sodium chloride infusion 250 mL  250 mL IntraVENous PRN    zinc oxide-white petrolatum 20-75 % topical paste   Topical BID    lidocaine (XYLOCAINE) 4 % (40 mg/mL) topical solution   Topical PRN    [Held by provider] rivaroxaban (XARELTO) tablet 2.5 mg  2.5 mg Oral BID WITH MEALS    [Held by provider] atorvastatin (LIPITOR) tablet 80 mg  80 mg Oral DAILY    labetaloL (NORMODYNE;TRANDATE) 20 mg/4 mL (5 mg/mL) injection 10 mg  10 mg IntraVENous Q6H PRN    sodium chloride (NS) flush 5-40 mL  5-40 mL IntraVENous Q8H    sodium chloride (NS) flush 5-40 mL  5-40 mL IntraVENous PRN    brimonidine (ALPHAGAN) 0.2 % ophthalmic solution 1 Drop  1 Drop Both Eyes TID    ferrous sulfate tablet 325 mg  325 mg Oral BID    glucose chewable tablet 16 g  4 Tablet Oral PRN    glucagon (GLUCAGEN) injection 1 mg  1 mg IntraMUSCular PRN    dextrose 10% infusion 125-250 mL  125-250 mL IntraVENous PRN    metoprolol succinate (TOPROL-XL) XL tablet 50 mg  50 mg Oral DAILY    amLODIPine (NORVASC) tablet 10 mg  10 mg Oral DAILY       Review of Systems:   Review of Systems   Unable to perform ROS: Other (patient suffered stroke)           Objective:   Physical Exam     Constitutional:       Appearance: She is ill-appearing. HENT:      Head: Normocephalic. Cardiovascular:      Rate and Rhythm: Normal rate. Pulmonary:      Effort: Pulmonary effort is normal.   Abdominal:      Palpations: Abdomen is soft. Genitourinary:     Comments: Bell catheter with CBI running  clear pinkish urine. As per nursing CBI is running at a slower rate  Skin:     General: Skin is warm. Neurological:      Mental Status: She is disoriented.    Visit Vitals  BP (!) 140/98 (BP 1 Location: Left upper arm, BP Patient Position: At rest)   Pulse 71   Temp 100.1 °F (37.8 °C)   Resp 17   Ht 5' 7.01\" (1.702 m)   Wt 181 lb 3.5 oz (82.2 kg)   SpO2 100%   BMI 28.38 kg/m²         Data Review:       Recent Days:  Recent Labs 02/28/22  0534 02/27/22  0525 02/26/22  0400   WBC 20.0* 22.5* 22.3*   HGB 7.4* 6.7* 7.5*   HCT 22.4* 19.7* 22.8*    349 350     Recent Labs     02/28/22  0534 02/27/22  0525 02/26/22  0400   *  146* 139 139   K 4.0  4.0 3.9 4.4   *  112* 106 106   CO2 30  30 27 26   *  194* 334* 224*   BUN 54*  53* 78* 76*   CREA 1.78*  1.76* 2.61* 3.09*   CA 8.4*  8.5 8.0* 8.1*   PHOS 2.6 3.3 5.3*   ALB 1.7*  1.7* 1.7* 2.0*   TBILI 0.3 0.4  --    ALT 1,570* 2,657*  --    INR 1.4*  --   --        24 Hour Results:  Recent Results (from the past 24 hour(s))   GLUCOSE, POC    Collection Time: 02/27/22  4:27 PM   Result Value Ref Range    Glucose (POC) 306 (H) 65 - 117 mg/dL    Performed by Terry Murdock    GLUCOSE, POC    Collection Time: 02/27/22  7:19 PM   Result Value Ref Range    Glucose (POC) 291 (H) 65 - 117 mg/dL    Performed by Terry Murdock    GLUCOSE, POC    Collection Time: 02/27/22 11:27 PM   Result Value Ref Range    Glucose (POC) 265 (H) 65 - 117 mg/dL    Performed by [facility]    GLUCOSE, POC    Collection Time: 02/28/22  3:44 AM   Result Value Ref Range    Glucose (POC) 215 (H) 65 - 117 mg/dL    Performed by [facility]    CBC WITH AUTOMATED DIFF    Collection Time: 02/28/22  5:34 AM   Result Value Ref Range    WBC 20.0 (H) 3.6 - 11.0 K/uL    RBC 2.55 (L) 3.80 - 5.20 M/uL    HGB 7.4 (L) 11.5 - 16.0 g/dL    HCT 22.4 (L) 35.0 - 47.0 %    MCV 87.8 80.0 - 99.0 FL    MCH 29.0 26.0 - 34.0 PG    MCHC 33.0 30.0 - 36.5 g/dL    RDW 16.1 (H) 11.5 - 14.5 %    PLATELET 314 533 - 852 K/uL    MPV 11.3 8.9 - 12.9 FL    NRBC 2.0 (H) 0.0  WBC    ABSOLUTE NRBC 0.40 (H) 0.00 - 0.01 K/uL    NEUTROPHILS 85 (H) 32 - 75 %    LYMPHOCYTES 11 (L) 12 - 49 %    MONOCYTES 2 (L) 5 - 13 %    EOSINOPHILS 1 0 - 7 %    BASOPHILS 0 0 - 1 %    IMMATURE GRANULOCYTES 1 (H) 0 - 0.5 %    ABS. NEUTROPHILS 17.1 (H) 1.8 - 8.0 K/UL    ABS. LYMPHOCYTES 2.2 0.8 - 3.5 K/UL    ABS.  MONOCYTES 0.3 0.0 - 1.0 K/UL    ABS. EOSINOPHILS 0.1 0.0 - 0.4 K/UL    ABS. BASOPHILS 0.1 0.0 - 0.1 K/UL    ABS. IMM. GRANS. 0.3 (H) 0.00 - 0.04 K/UL    DF AUTOMATED     RENAL FUNCTION PANEL    Collection Time: 02/28/22  5:34 AM   Result Value Ref Range    Sodium 146 (H) 136 - 145 mmol/L    Potassium 4.0 3.5 - 5.1 mmol/L    Chloride 112 (H) 97 - 108 mmol/L    CO2 30 21 - 32 mmol/L    Anion gap 4 (L) 5 - 15 mmol/L    Glucose 194 (H) 65 - 100 mg/dL    BUN 53 (H) 6 - 20 mg/dL    Creatinine 1.76 (H) 0.55 - 1.02 mg/dL    BUN/Creatinine ratio 30 (H) 12 - 20      GFR est AA 34 (L) >60 ml/min/1.73m2    GFR est non-AA 28 (L) >60 ml/min/1.73m2    Calcium 8.5 8.5 - 10.1 mg/dL    Phosphorus 2.6 2.6 - 4.7 mg/dL    Albumin 1.7 (L) 3.5 - 5.0 g/dL   METABOLIC PANEL, COMPREHENSIVE    Collection Time: 02/28/22  5:34 AM   Result Value Ref Range    Sodium 146 (H) 136 - 145 mmol/L    Potassium 4.0 3.5 - 5.1 mmol/L    Chloride 110 (H) 97 - 108 mmol/L    CO2 30 21 - 32 mmol/L    Anion gap 6 5 - 15 mmol/L    Glucose 191 (H) 65 - 100 mg/dL    BUN 54 (H) 6 - 20 mg/dL    Creatinine 1.78 (H) 0.55 - 1.02 mg/dL    BUN/Creatinine ratio 30 (H) 12 - 20      GFR est AA 34 (L) >60 ml/min/1.73m2    GFR est non-AA 28 (L) >60 ml/min/1.73m2    Calcium 8.4 (L) 8.5 - 10.1 mg/dL    Bilirubin, total 0.3 0.2 - 1.0 mg/dL    AST (SGOT) 317 (H) 15 - 37 U/L    ALT (SGPT) 1,570 (H) 12 - 78 U/L    Alk. phosphatase 151 (H) 45 - 117 U/L    Protein, total 6.0 (L) 6.4 - 8.2 g/dL    Albumin 1.7 (L) 3.5 - 5.0 g/dL    Globulin 4.3 (H) 2.0 - 4.0 g/dL    A-G Ratio 0.4 (L) 1.1 - 2.2     HEPATITIS PANEL, ACUTE    Collection Time: 02/28/22  5:34 AM   Result Value Ref Range    Hepatitis A, IgM NONREACTIVE NONREACTIVE      __        Hepatitis B surface Ag <0.10 Index    Hep B surface Ag Interp. Negative Negative    __        Hepatitis B core, IgM NONREACTIVE NONREACTIVE    __        Hepatitis C virus Ab >11.00 Index    Hep C virus Ab Interp.  Reactive (A) NONREACTIVE     PROTHROMBIN TIME + INR Collection Time: 02/28/22  5:34 AM   Result Value Ref Range    Prothrombin time 16.3 (H) 11.9 - 14.6 sec    INR 1.4 (H) 0.9 - 1.1     GLUCOSE, POC    Collection Time: 02/28/22  6:56 AM   Result Value Ref Range    Glucose (POC) 203 (H) 65 - 117 mg/dL    Performed by [facility] [employer]    GLUCOSE, POC    Collection Time: 02/28/22 11:02 AM   Result Value Ref Range    Glucose (POC) 272 (H) 65 - 117 mg/dL    Performed by [facility] [employer]    GLUCOSE, POC    Collection Time: 02/28/22  3:19 PM   Result Value Ref Range    Glucose (POC) 279 (H) 65 - 117 mg/dL    Performed by [facility] [employer]            Assessment/     Patient Active Problem List   Diagnosis Code    HTN (hypertension) I10    Hyperlipidemia E78.5    Neuropathy G62.9    Sciatica M54.30    Diabetes mellitus with neurological manifestations, uncontrolled ([address] E11.49, E11.65    Infection of nailbed of toe of left foot L03.032    PAD (peripheral artery disease) ([facility]) I73.9    Hypercalcemia E83.52    DM type 2 causing vascular disease ([address] E11.59    Type 2 diabetes mellitus with nephropathy ([address] E11.21    Acute osteomyelitis of ankle or foot ([address] M86.179    Diabetic peripheral neuropathy ([address] E11.42    Spinal stenosis in cervical region M48.02    Ischemia of both lower extremities I99.8    Pain in both lower legs M79.661, M79.662    CVA (cerebral vascular accident) ([address] I63.9    Elevated troponin R77.8       Plan:  1.UTI/Hemoragic Cystitis  E. coli was from urine Cx on 2/23,  she recived a 5-day course of ceftriaxone,  Bell catheter with CBI running  clear pinkish urine. CBI is running at a slower rate  CT of abdomen and pelvis from 2/25/2022  IMPRESSION  Hemorrhage within the urinary bladder. Mild left  Hydroureteronephrosis  -Cystoscopy when patient is stable  3. RICARDO/CKD  Creatinine and BUN trending down  4.  CVA- acute infarct left paracentral lobule and region of white matter of left motor cortex    Care Plan discussed with: Dr. Filomena Merida Cassie Sibley, Attending Physician      Gillie Ormond, NP

## 2022-02-28 NOTE — PROGRESS NOTES
Comprehensive Nutrition Assessment    Type and Reason for Visit: Initial (TF)    Nutrition Recommendations/Plan:   Continue NPO, advance per SLP    Adjust TF via NGT to Glucerna 1.5 at goal of 55ml/hr  Flush 150ml water q4hrs  Provides 1980kcals, 109g pro, and 1903ml water (Meets >/=95% of needs)    Will monitor for need to adjust back to renal formula     Nutrition Assessment:  Admitted for CVA (noted recent CVA  Last week), +trops, +UTI. and LFTs, likely rhabdo. SLP unable to eval d/t lethargy, NGT placed 2/25 and Nepro initiated next day. RD to adjust TF to best meet pt needs while unable to takes PO, discussed with RN. Pt minimally responsive at this time. Labs: H/H 7.4/22.4, Na 146, BUN 53, Cr 1.76, -272, LFT elevated, Alb 1.7. Meds: IVF, Ceftriaxone, FeSu, insulin. Malnutrition Assessment:  Malnutrition Status:  No malnutrition    Context:  Chronic illness     Findings of the 6 clinical characteristics of malnutrition:   Energy Intake:  Unable to assess  Weight Loss:  No significant weight loss     Body Fat Loss:  No significant body fat loss,     Muscle Mass Loss:  No significant muscle mass loss,    Fluid Accumulation:  No significant fluid accumulation,        Estimated Daily Nutrient Needs:  Energy (kcal): 2055-2466kcals (25-30kcals/kg); Weight Used for Energy Requirements: Current  Protein (g): 82-107g (1.0-1.3g/kg); Weight Used for Protein Requirements: Current  Fluid (ml/day): 1644-2055ml (20-25ml/kg); Method Used for Fluid Requirements: ml/kg      Nutrition Related Findings:  NFPE with no acute findings, uses a walker at BL and ambulates minimally. Noted R great toe amputated. Unknown hx of dysphagia. No N/V/D/C, last BM 2/25. No edema.         Wounds:    Diabetic ulcer,Stage II (DFU L toe; stage 2 -buttocks)       Current Nutrition Therapies:  DIET NPO  ADULT TUBE FEEDING Nasogastric; Diabetic; Delivery Method: Continuous; Continuous Initial Rate (mL/hr): 30; Continuous Advance Tube Feeding: Yes; Advancement Volume (mL/hr): 10; Advancement Frequency: Q 4 hours; Continuous Goal Rate (mL/hr): 55; W... Anthropometric Measures:  · Height:  5' 7.01\" (170.2 cm)  · Current Body Wt:  82.2 kg (181 lb 3.5 oz) (2/27)   · Admission Body Wt:       · Usual Body Wt:   (elkin)     · Ideal Body Wt:  135 lbs:  134.2 %   · BMI Category: Overweight (BMI 25.0-29. 9)     Wt Readings from Last 6 Encounters:   02/27/22 82.2 kg (181 lb 3.5 oz)   07/21/20 84.4 kg (186 lb)   02/17/20 84.8 kg (187 lb)   02/17/20 84.8 kg (187 lb)   10/25/16 81.2 kg (179 lb 1.6 oz)   07/18/16 79.8 kg (176 lb)   wt loss of 2.2kg x 7 months, not significant. Nutrition Diagnosis:   · Inadequate oral intake related to cognitive or neurological impairment,swallowing difficulty,biting/chewing (masticatory) difficulty (CVA) as evidenced by NPO or clear liquid status due to medical condition,nutrition support-enteral nutrition      Nutrition Interventions:   Food and/or Nutrient Delivery: Continue NPO,Modify tube feeding  Nutrition Education and Counseling: No recommendations at this time  Coordination of Nutrition Care: Continue to monitor while inpatient,Speech therapy,Swallow evaluation    Goals:  Meet>/=50% of EENs in >5 days, Wt maintenance within +/-0.5kg in >5 days, Signs of wound healing per nsg assessment in 5 days       Nutrition Monitoring and Evaluation:   Behavioral-Environmental Outcomes: None identified  Food/Nutrient Intake Outcomes: Diet advancement/tolerance,Enteral nutrition intake/tolerance  Physical Signs/Symptoms Outcomes: Weight,Skin,Chewing or swallowing,Biochemical data    Discharge Planning:     Too soon to determine     Electronically signed by [employer] on 2/28/2022 at 12:40 PM    Contact: Ext [phone], or via [employer].

## 2022-03-01 NOTE — PROGRESS NOTES
Progress Note    Patient: Raulito Bello MRN: [id number]  SSN: [id number]    YOB: 1945  Age: 78 y.o.   Sex: female      Admit Date: 2/21/2022    LOS: 4 days     Subjective:   Pt seen and examined  Breathing better,  No further seizure activity       Past Medical History:   Diagnosis Date    [facility]     HTN (hypertension)     Hyperlipidemia     Neuropathy     PAD (peripheral artery disease) ([facility]     PCI    Sciatica         Current Facility-Administered Medications:     0.45% sodium chloride infusion, 50 mL/hr, IntraVENous, CONTINUOUS, Kalpesh Lara MD, Last Rate: 50 mL/hr at 03/01/22 1040, 50 mL/hr at 03/01/22 1040    piperacillin-tazobactam (ZOSYN) 3.375 g in 0.9% sodium chloride (MBP/ADV) 100 mL MBP, 3.375 g, IntraVENous, Q8H, Fred Barnes MD, Last Rate: 25 mL/hr at 03/01/22 1304, 3.375 g at 03/01/22 1304    0.9% sodium chloride infusion 250 mL, 250 mL, IntraVENous, PRN, Israel Soto MD    insulin lispro (HUMALOG) injection, , SubCUTAneous, Q4H, Israel Soto MD, 9 Units at 03/01/22 1138    latanoprost (XALATAN) 0.005 % ophthalmic solution 1 Drop, 1 Drop, Right Eye, QPM, Israel Soto MD, 1 Drop at 02/28/22 1830    gabapentin (NEURONTIN) capsule 100 mg, 100 mg, Oral, DAILY, Orestes Norman MD, 100 mg at 03/01/22 0800    0.9% sodium chloride infusion 250 mL, 250 mL, IntraVENous, PRN, Shilpa Louise MD    zinc oxide-white petrolatum 20-75 % topical paste, , Topical, BID, Orestes Norman MD, Given at 03/01/22 0900    lidocaine (XYLOCAINE) 4 % (40 mg/mL) topical solution, , Topical, PRN, Tesha JACKSON MD, Given at 02/26/22 2207    [Held by provider] rivaroxaban (XARELTO) tablet 2.5 mg, 2.5 mg, Oral, BID WITH MEALS, Omer Diana MD, 2.5 mg at 02/24/22 1720    [Held by provider] atorvastatin (LIPITOR) tablet 80 mg, 80 mg, Oral, DAILY, Omer Diana MD, 80 mg at 02/26/22 0900    labetaloL (NORMODYNE;TRANDATE) 20 mg/4 mL (5 mg/mL) injection 10 mg, 10 mg, IntraVENous, Q6H PRN, Elvie Sahni MD    sodium chloride (NS) flush 5-40 mL, 5-40 mL, IntraVENous, Q8H, Orestes Norman MD, 10 mL at 03/01/22 1304    sodium chloride (NS) flush 5-40 mL, 5-40 mL, IntraVENous, PRN, Orestes Bruno MD    brimonidine (ALPHAGAN) 0.2 % ophthalmic solution 1 Drop, 1 Drop, Both Eyes, TID, Orestes Norman MD, 1 Drop at 03/01/22 0800    ferrous sulfate tablet 325 mg, 325 mg, Oral, BID, Orestes Norman MD, 325 mg at 03/01/22 0800    glucose chewable tablet 16 g, 4 Tablet, Oral, PRN, Radha JACKSON MD    glucagon (GLUCAGEN) injection 1 mg, 1 mg, IntraMUSCular, PRN, Orestes Bruno MD    dextrose 10% infusion 125-250 mL, 125-250 mL, IntraVENous, PRN, Orestes Bruno MD    metoprolol succinate (TOPROL-XL) XL tablet 50 mg, 50 mg, Oral, DAILY, Brittani LONG MD, 50 mg at 03/01/22 0800    amLODIPine (NORVASC) tablet 10 mg, 10 mg, Oral, DAILY, Brenda Maldonado MD, 10 mg at 03/01/22 0800    Objective:     Vitals:    03/01/22 1200 03/01/22 1300 03/01/22 1325 03/01/22 1400   BP: (!) 130/51 (!) 126/54  (!) 137/56   Pulse: 66 71  69   Resp: 18 18  16   Temp:       SpO2: 100% 100% 100% 98%   Weight:       Height:            Intake and Output:  Current Shift: 03/01 0701 - 03/01 1900  In: 5550   Out: 7360 [Urine:5500]  Last three shifts: 02/27 1901 - 03/01 0700  In: 64380   Out: 71912 [Urine:05849]    Physical Exam:   Physical Exam  Constitutional:       Appearance: She is ill-appearing. HENT:      Head: Atraumatic. Mouth/Throat:      Mouth: Mucous membranes are dry. Eyes:      General: No scleral icterus. Cardiovascular:      Rate and Rhythm: Regular rhythm. Pulses: Normal pulses. Pulmonary:      Effort: Pulmonary effort is normal.   Abdominal:      Tenderness: There is no abdominal tenderness. Musculoskeletal:         General: Normal range of motion. Cervical back: Normal range of motion. Skin:     General: Skin is warm. Neurological:      General: No focal deficit present. Motor: No weakness.       Gait: Gait normal.          Lab/Data Review:  Recent Results (from the past 24 hour(s))   GLUCOSE, POC    Collection Time: 02/28/22  3:19 PM   Result Value Ref Range    Glucose (POC) 279 (H) 65 - 117 mg/dL    Performed by [address] [place] [address], POC    Collection Time: 02/28/22  6:24 PM   Result Value Ref Range    Glucose (POC) 205 (H) 65 - 117 mg/dL    Performed by [address] [place] [address], POC    Collection Time: 02/28/22 10:31 PM   Result Value Ref Range    Glucose (POC) 225 (H) 65 - 117 mg/dL    Performed by [address] [place] [address], POC    Collection Time: 03/01/22  2:21 AM   Result Value Ref Range    Glucose (POC) 228 (H) 65 - 117 mg/dL    Performed by Arlene Mancia    RENAL FUNCTION PANEL    Collection Time: 03/01/22  3:30 AM   Result Value Ref Range    Sodium 147 (H) 136 - 145 mmol/L    Potassium 4.4 3.5 - 5.1 mmol/L    Chloride 113 (H) 97 - 108 mmol/L    CO2 29 21 - 32 mmol/L    Anion gap 5 5 - 15 mmol/L    Glucose 231 (H) 65 - 100 mg/dL    BUN 39 (H) 6 - 20 mg/dL    Creatinine 1.62 (H) 0.55 - 1.02 mg/dL    BUN/Creatinine ratio 24 (H) 12 - 20      GFR est AA 38 (L) >60 ml/min/1.73m2    GFR est non-AA 31 (L) >60 ml/min/1.73m2    Calcium 8.4 (L) 8.5 - 10.1 mg/dL    Phosphorus 2.4 (L) 2.6 - 4.7 mg/dL    Albumin 1.6 (L) 3.5 - 5.0 g/dL   CBC WITH AUTOMATED DIFF    Collection Time: 03/01/22  3:30 AM   Result Value Ref Range    WBC 15.2 (H) 3.6 - 11.0 K/uL    RBC 2.50 (L) 3.80 - 5.20 M/uL    HGB 7.3 (L) 11.5 - 16.0 g/dL    HCT 22.9 (L) 35.0 - 47.0 %    MCV 91.6 80.0 - 99.0 FL    MCH 29.2 26.0 - 34.0 PG    MCHC 31.9 30.0 - 36.5 g/dL    RDW 16.4 (H) 11.5 - 14.5 %    PLATELET 005 636 - 127 K/uL    MPV 11.4 8.9 - 12.9 FL    NRBC 1.4 (H) 0.0  WBC    ABSOLUTE NRBC 0.22 (H) 0.00 - 0.01 K/uL    NEUTROPHILS 81 (H) 32 - 75 %    LYMPHOCYTES 14 12 - 49 %    MONOCYTES 3 (L) 5 - 13 %    EOSINOPHILS 1 0 - 7 %    BASOPHILS 0 0 - 1 % IMMATURE GRANULOCYTES 1 (H) 0 - 0.5 %    ABS. NEUTROPHILS 12.4 (H) 1.8 - 8.0 K/UL    ABS. LYMPHOCYTES 2.1 0.8 - 3.5 K/UL    ABS. MONOCYTES 0.4 0.0 - 1.0 K/UL    ABS. EOSINOPHILS 0.1 0.0 - 0.4 K/UL    ABS. BASOPHILS 0.0 0.0 - 0.1 K/UL    ABS. IMM. GRANS. 0.2 (H) 0.00 - 0.04 K/UL    DF AUTOMATED     C REACTIVE PROTEIN, QT    Collection Time: 03/01/22  3:30 AM   Result Value Ref Range    C-Reactive protein 10.30 (H) 0.00 - 0.60 mg/dL   PROCALCITONIN    Collection Time: 03/01/22  3:30 AM   Result Value Ref Range    Procalcitonin 11.83 (H) 0 ng/mL   GLUCOSE, POC    Collection Time: 03/01/22  6:00 AM   Result Value Ref Range    Glucose (POC) 224 (H) 65 - 117 mg/dL    Performed by [address] [place] [address], POC    Collection Time: 03/01/22 11:17 AM   Result Value Ref Range    Glucose (POC) 295 (H) 65 - 117 mg/dL    Performed by Corie Haji         XR FOOT LT AP/LAT   Final Result      DUPLEX UPPER EXT VENOUS RIGHT   Final Result      US ABD COMP   Final Result   Gallbladder sludge without other acute abnormality. XR CHEST PORT   Final Result   The cardiomediastinal silhouette is appropriate for age, technique,   and lung expansion. Pulmonary vasculature is not congested. The lungs are   essentially clear. No effusion or pneumothorax is seen. CT HEAD WO CONT   Final Result   Negative for acute intracranial process. CT ABD PELV WO CONT   Final Result   Hemorrhage within the urinary bladder. Mild left   hydroureteronephrosis. XR SWALLOW FUNC VIDEO   Final Result   Penetration with thin consistency. No aspiration. CTA HEAD NECK W WO CONT   Final Result   Negative head and neck CTA. Please note that all carotid bifurcation stenoses are measured as per NASCET   criteria. CT CODE NEURO HEAD WO CONTRAST   Final Result   1. No acute large vessel intracranial ischemia, hemorrhage. Called report to   patient's nurse Darrick Malcolm at 7:12 PM 2/22/2022.   2. Right basal ganglia lacunar infarct. 3. Periventricular microangiopathy. 4. Pansinusitis. See above. MRI BRAIN WO CONT   Final Result   Acute infarction left paracentral lobule and region of the white   matter of the left motor cortex. CT CODE NEURO HEAD WO CONTRAST   Final Result      1. No acute intracranial hemorrhage. 2. Left basal ganglia lacunar infarct, which is age indeterminate in the absence   of any prior outside studies, but favored to be chronic. 3. Moderate to severe paranasal sinus disease.       Results called to Dr. Renita Donald at 9:39 PM on 2/21/2022           Assessment:     Active Problems:    CVA (cerebral vascular accident) ([facility] (2/21/2022)      Elevated troponin (2/21/2022)         Elevated of transaminase, no documented history of hepatic disease, hepatitis,  had a seizure yesterday, likely patient had rhabdomyolysis secondary to seizure disorders    transaminase down  History of CVA, seisure  Urosepsis, on multiple antibiotic  Hb drop some     IV hydrations, follow renal function closely  Continue NG tube feeding  Follow the liver function test in the morning  Sign off         Signed By: Eder Mclaughlin MD     March 1, 2022        Thank you for allowing me to participate in this patients care  Cc Referring Physician   Derrick Coleman MD

## 2022-03-01 NOTE — PROGRESS NOTES
Spiritual Care Assessment/Progress Note  [facility]      NAME: Kiya Goldman      MRN: [id number]  AGE: 78 y.o.  SEX: female  Jew Affiliation: No preference   Language: English     3/1/2022     Total Time (in minutes): 90     Spiritual Assessment begun in [facility] [place] ICU through conversation with:         []Patient        [x] Family    [] Friend(s)        Reason for Consult: Family care,Initial visit,Initial/Spiritual assessment, patient floor,Other (comment) (Family meeting)     Spiritual beliefs: (Please include comment if needed)     [] Identifies with a lesley tradition:         [x] Supported by a lesley community:            [] Claims no spiritual orientation:           [] Seeking spiritual identity:                [] Adheres to an individual form of spirituality:           [] Not able to assess:                           Identified resources for coping:      [x] Prayer                               [] Music                  [] Guided Imagery     [x] Family/friends                 [] Pet visits     [] Devotional reading                         [] Unknown     [] Other:                                               Interventions offered during this visit: (See comments for more details)          Family/Friend(s): Catharsis/review of pertinent events in supportive environment,Bridging,Normalization of emotional/spiritual concerns     Plan of Care:     [x] Support spiritual and/or cultural needs    [] Support AMD and/or advance care planning process      [] Support grieving process   [] Coordinate Rites and/or Rituals    [] Coordination with community clergy   [] No spiritual needs identified at this time   [] Detailed Plan of Care below (See Comments)  [] Make referral to Music Therapy  [] Make referral to Pet Therapy     [] Make referral to Addiction services  [] Make referral to [facility]  [] Make referral to Spiritual Care Partner  [] No future visits requested        [x] Contact Spiritual Care for further referrals     Comments:  was request to be present for family meeting by both Doctor and Nurse. All 6 children were all present for the family meeting. The doctor and nurse reviewed the over all patient care, up to this day. The family was tearful and were able to ask their questions.  offered ministry of presence and support for daugther after the family meeting. The family is very active in the care being given and supporting each other. Advised nurse to contact [facility] for any further referrals.     [address], [facility]    Please [facility]  in order to get in touch with  for any Spiritual Care Needs   [phone]   OR   Reach out to us on [address] [facility]

## 2022-03-01 NOTE — PROGRESS NOTES
Progress Note    Patient: Mya Merrill MRN: [id number]  SSN: [id number]    YOB: 1945  Age: 78 y.o. Sex: female      Admit Date: 2/21/2022    LOS: 4 days     Subjective:     78years old patient with multiple and recurrent acute CVA, renal failure acute, hepatic injury, hepatitis C, bedbound, diabetic uncontrolled, PVD, possible non-STEMI, UTI sepsis been seen by the neurologist, cardiologist, nephrologist and infectious disease. Patient is noncommunicative appears more alert with facial droop    Objective:     Vitals:    03/01/22 1300 03/01/22 1325 03/01/22 1400 03/01/22 1503   BP: (!) 126/54  (!) 137/56 (!) 142/62   Pulse: 71  69 70   Resp: 18  16 16   Temp:       SpO2: 100% 100% 98% 96%   Weight:       Height:            Intake and Output:  Current Shift: 03/01 0701 - 03/01 1900  In: 5550   Out: 0223 [Urine:5500]  Last three shifts: 02/27 1901 - 03/01 0700  In: 30255   Out: 40172 [Urine:70461]    Physical Exam:   General:  Alert, cooperative, no distress, appears stated age. Eyes:  Conjunctivae/corneas clear. PERRL, EOMs intact. Fundi benign   Ears:  Normal TMs and external ear canals both ears. Nose: Nares normal. Septum midline. Mucosa normal. No drainage or sinus tenderness. Mouth/Throat: Lips, mucosa, and tongue normal. Teeth and gums normal.   Neck: Supple, symmetrical, trachea midline, no adenopathy, thyroid: no enlargment/tenderness/nodules, no carotid bruit and no JVD. Back:   Symmetric, no curvature. ROM normal. No CVA tenderness. Lungs:   Clear to auscultation bilaterally. Heart:  Regular rate and rhythm, S1, S2 normal, no murmur, click, rub or gallop. Abdomen:   Soft, non-tender. Bowel sounds normal. No masses,  No organomegaly. Extremities: Extremities normal, atraumatic, no cyanosis or edema. Pulses: 2+ and symmetric all extremities.    Skin: Skin color, texture, turgor normal. No rashes or lesions   Lymph nodes:  Diffuse weakness   Neurologic: Lab/Data Review: All lab results for the last 24 hours reviewed.      Recent Results (from the past 24 hour(s))   GLUCOSE, POC    Collection Time: 02/28/22  6:24 PM   Result Value Ref Range    Glucose (POC) 205 (H) 65 - 117 mg/dL    Performed by [address] [place] [address]    Collection Time: 02/28/22  8:15 PM    Specimen: Wound   Result Value Ref Range    Special Requests: No Special Requests      GRAM STAIN 2+ Gram Negative Rods      GRAM STAIN 2+ Gram Positive Cocci      Culture result: PENDING    GLUCOSE, POC    Collection Time: 02/28/22 10:31 PM   Result Value Ref Range    Glucose (POC) 225 (H) 65 - 117 mg/dL    Performed by [address] [place] [address], POC    Collection Time: 03/01/22  2:21 AM   Result Value Ref Range    Glucose (POC) 228 (H) 65 - 117 mg/dL    Performed by Nickola Child    RENAL FUNCTION PANEL    Collection Time: 03/01/22  3:30 AM   Result Value Ref Range    Sodium 147 (H) 136 - 145 mmol/L    Potassium 4.4 3.5 - 5.1 mmol/L    Chloride 113 (H) 97 - 108 mmol/L    CO2 29 21 - 32 mmol/L    Anion gap 5 5 - 15 mmol/L    Glucose 231 (H) 65 - 100 mg/dL    BUN 39 (H) 6 - 20 mg/dL    Creatinine 1.62 (H) 0.55 - 1.02 mg/dL    BUN/Creatinine ratio 24 (H) 12 - 20      GFR est AA 38 (L) >60 ml/min/1.73m2    GFR est non-AA 31 (L) >60 ml/min/1.73m2    Calcium 8.4 (L) 8.5 - 10.1 mg/dL    Phosphorus 2.4 (L) 2.6 - 4.7 mg/dL    Albumin 1.6 (L) 3.5 - 5.0 g/dL   CBC WITH AUTOMATED DIFF    Collection Time: 03/01/22  3:30 AM   Result Value Ref Range    WBC 15.2 (H) 3.6 - 11.0 K/uL    RBC 2.50 (L) 3.80 - 5.20 M/uL    HGB 7.3 (L) 11.5 - 16.0 g/dL    HCT 22.9 (L) 35.0 - 47.0 %    MCV 91.6 80.0 - 99.0 FL    MCH 29.2 26.0 - 34.0 PG    MCHC 31.9 30.0 - 36.5 g/dL    RDW 16.4 (H) 11.5 - 14.5 %    PLATELET 439 601 - 437 K/uL    MPV 11.4 8.9 - 12.9 FL    NRBC 1.4 (H) 0.0  WBC    ABSOLUTE NRBC 0.22 (H) 0.00 - 0.01 K/uL    NEUTROPHILS 81 (H) 32 - 75 %    LYMPHOCYTES 14 12 - 49 %    MONOCYTES 3 (L) 5 - 13 %    EOSINOPHILS 1 0 - 7 %    BASOPHILS 0 0 - 1 %    IMMATURE GRANULOCYTES 1 (H) 0 - 0.5 %    ABS. NEUTROPHILS 12.4 (H) 1.8 - 8.0 K/UL    ABS. LYMPHOCYTES 2.1 0.8 - 3.5 K/UL    ABS. MONOCYTES 0.4 0.0 - 1.0 K/UL    ABS. EOSINOPHILS 0.1 0.0 - 0.4 K/UL    ABS. BASOPHILS 0.0 0.0 - 0.1 K/UL    ABS. IMM. GRANS. 0.2 (H) 0.00 - 0.04 K/UL    DF AUTOMATED     C REACTIVE PROTEIN, QT    Collection Time: 03/01/22  3:30 AM   Result Value Ref Range    C-Reactive protein 10.30 (H) 0.00 - 0.60 mg/dL   PROCALCITONIN    Collection Time: 03/01/22  3:30 AM   Result Value Ref Range    Procalcitonin 11.83 (H) 0 ng/mL   GLUCOSE, POC    Collection Time: 03/01/22  6:00 AM   Result Value Ref Range    Glucose (POC) 224 (H) 65 - 117 mg/dL    Performed by [address] [place] [address], POC    Collection Time: 03/01/22 11:17 AM   Result Value Ref Range    Glucose (POC) 295 (H) 65 - 117 mg/dL    Performed by Melvin Holloway      Quantitative hepatitis C pending    Assessment:     Active Problems:    CVA (cerebral vascular accident) ([facility] (2/21/2022)      Elevated troponin (2/21/2022)    As above  Multiorgan failure  Prognosis is guarded but serious    Plan:     Family meeting today set goals and updates  Critical and prolonged care time 60 minutes  Had a prolonged family meeting with 5 children, primary care nurse,  and . Diagnosis and prognosis were discussed with family members. Prognosis is serious. Family would like to have PEG tube placed. They would like patient home on home health.   Family meeting time 1 hour      Signed By: Chelsi Mark MD     March 1, 2022

## 2022-03-01 NOTE — PROGRESS NOTES
Renal Daily Progress Note    Admit Date: 2/21/2022      Subjective:   She remains nonverbal.  She was on diltiazem and losartan as an outpatient. She is currently on amlodipine and metoprolol XL. She is on CBI.       Current Facility-Administered Medications   Medication Dose Route Frequency    piperacillin-tazobactam (ZOSYN) 3.375 g in 0.9% sodium chloride (MBP/ADV) 100 mL MBP  3.375 g IntraVENous Q8H    0.9% sodium chloride infusion 250 mL  250 mL IntraVENous PRN    insulin lispro (HUMALOG) injection   SubCUTAneous Q4H    latanoprost (XALATAN) 0.005 % ophthalmic solution 1 Drop  1 Drop Right Eye QPM    gabapentin (NEURONTIN) capsule 100 mg  100 mg Oral DAILY    0.9% sodium chloride infusion  50 mL/hr IntraVENous CONTINUOUS    0.9% sodium chloride infusion 250 mL  250 mL IntraVENous PRN    zinc oxide-white petrolatum 20-75 % topical paste   Topical BID    lidocaine (XYLOCAINE) 4 % (40 mg/mL) topical solution   Topical PRN    [Held by provider] rivaroxaban (XARELTO) tablet 2.5 mg  2.5 mg Oral BID WITH MEALS    [Held by provider] atorvastatin (LIPITOR) tablet 80 mg  80 mg Oral DAILY    labetaloL (NORMODYNE;TRANDATE) 20 mg/4 mL (5 mg/mL) injection 10 mg  10 mg IntraVENous Q6H PRN    sodium chloride (NS) flush 5-40 mL  5-40 mL IntraVENous Q8H    sodium chloride (NS) flush 5-40 mL  5-40 mL IntraVENous PRN    brimonidine (ALPHAGAN) 0.2 % ophthalmic solution 1 Drop  1 Drop Both Eyes TID    ferrous sulfate tablet 325 mg  325 mg Oral BID    glucose chewable tablet 16 g  4 Tablet Oral PRN    glucagon (GLUCAGEN) injection 1 mg  1 mg IntraMUSCular PRN    dextrose 10% infusion 125-250 mL  125-250 mL IntraVENous PRN    metoprolol succinate (TOPROL-XL) XL tablet 50 mg  50 mg Oral DAILY    amLODIPine (NORVASC) tablet 10 mg  10 mg Oral DAILY        Review of Systems    ROS   Not obtainable    Objective:     Patient Vitals for the past 8 hrs:   BP Temp Pulse Resp SpO2   03/01/22 1000 (!) 182/74 -- 74 20 100 % 03/01/22 0900 (!) 146/53 -- 72 21 100 %   03/01/22 0839 -- -- -- -- 100 %   03/01/22 0800 (!) 156/49 -- 69 14 100 %   03/01/22 0700 (!) 144/51 99.7 °F (37.6 °C) 70 16 100 %   03/01/22 0600 (!) 154/57 -- 72 16 100 %   03/01/22 0500 (!) 141/51 -- 71 21 100 %   03/01/22 0400 (!) 155/56 -- 70 16 100 %   03/01/22 0300 (!) 167/59 99.5 °F (37.5 °C) 64 18 100 %     03/01 0701 - 03/01 1900  In: -   Out: 2400 [Urine:2400]  02/27 1901 - 03/01 0700  In: 01570   Out: 68215 [Urine:05315]    Physical Exam:   Physical Exam  Cardiovascular:      Rate and Rhythm: Regular rhythm. Pulmonary:      Breath sounds: Normal breath sounds. Abdominal:      General: Bowel sounds are normal.      Palpations: Abdomen is soft. Trace edema  She is nonverbal  She continues on CBI. DUPLEX UPPER EXT VENOUS RIGHT   Final Result      US ABD COMP   Final Result   Gallbladder sludge without other acute abnormality. XR CHEST PORT   Final Result   The cardiomediastinal silhouette is appropriate for age, technique,   and lung expansion. Pulmonary vasculature is not congested. The lungs are   essentially clear. No effusion or pneumothorax is seen. CT HEAD WO CONT   Final Result   Negative for acute intracranial process. CT ABD PELV WO CONT   Final Result   Hemorrhage within the urinary bladder. Mild left   hydroureteronephrosis. XR SWALLOW FUNC VIDEO   Final Result   Penetration with thin consistency. No aspiration. CTA HEAD NECK W WO CONT   Final Result   Negative head and neck CTA. Please note that all carotid bifurcation stenoses are measured as per NASCET   criteria. CT CODE NEURO HEAD WO CONTRAST   Final Result   1. No acute large vessel intracranial ischemia, hemorrhage. Called report to   patient's nurse Mildred Gandhi at 7:12 PM 2/22/2022.   2. Right basal ganglia lacunar infarct. 3. Periventricular microangiopathy. 4. Pansinusitis. See above.       MRI BRAIN WO CONT   Final Result   Acute infarction left paracentral lobule and region of the white   matter of the left motor cortex. CT CODE NEURO HEAD WO CONTRAST   Final Result      1. No acute intracranial hemorrhage. 2. Left basal ganglia lacunar infarct, which is age indeterminate in the absence   of any prior outside studies, but favored to be chronic. 3. Moderate to severe paranasal sinus disease. Results called to Dr. Vince Molina at 9:39 PM on 2/21/2022      XR FOOT LT AP/LAT    (Results Pending)        Data Review   Recent Labs     03/01/22 0330 02/28/22  0534 02/27/22  0525   WBC 15.2* 20.0* 22.5*   HGB 7.3* 7.4* 6.7*   HCT 22.9* 22.4* 19.7*    313 349     Recent Labs     03/01/22 0330 02/28/22  0534 02/27/22  0525   * 146*  146* 139   K 4.4 4.0  4.0 3.9   * 110*  112* 106   CO2 29 30  30 27   * 191*  194* 334*   BUN 39* 54*  53* 78*   CREA 1.62* 1.78*  1.76* 2.61*   CA 8.4* 8.4*  8.5 8.0*   PHOS 2.4* 2.6 3.3   ALB 1.6* 1.7*  1.7* 1.7*   ALT  --  1,570* 2,657*   INR  --  1.4*  --      No components found for: GLPOC  No results for input(s): PH, PCO2, PO2, HCO3, FIO2 in the last 72 hours. Recent Labs     02/28/22  0534   INR 1.4*         Assessment:           Active Problems:    CVA (cerebral vascular accident) ([facility] [address] (2/21/2022)      Elevated troponin (2/21/2022)    Acute kidney injury improving. Creatinine 1.62 mg today down from 3.09 mg over the weekend. Wonder if she has underlying CKD. Modest hypernatremia    Elevated transaminases- improving -  GI following    Normocytic anemia    Acute CVA    Diabetes mellitus with peripheral neuropathy    Hematuria on CBI as per urology    E. coli urinary tract infection    Modest hypophosphatemia. Hypertension. She has been started on amlodipine and metoprolol. Plan:   Unable to quantitate urine output with CBI. Modest improvement in the creatinine. Avoid hypotension.   Change normal saline to half-normal saline to correct hypernatremia. Replace phosphorus.

## 2022-03-01 NOTE — PROGRESS NOTES
Neurology Progress Note    Patient ID:  Raulito Bello  [id number]  24 y.o.  1945    Subjective: The patient is seen in follow-up this morning for question of seizure after stroke she is lethargic though arousable, not much change from yesterday, but responded today by saying good and attempted to squeeze my finger with the right hand. No sedation since she was given 2 mg of Ativan the night before and also Norco tablet for her neuropathic pain in the legs due to diabetic neuropathy. Patient is a 78year old woman with HTN, DM who had a stroke last week with right facial droop and right sided weakness worked up in [facility] who was brought in for worsening speech and left sided weakness per daughters at bedside. She is living at home and uses a walker to ambulate at baseline. Ct head done yesterday shows old basal ganglia stroke. Her BP are elevated to 963O systolic.     Current Facility-Administered Medications   Medication Dose Route Frequency    piperacillin-tazobactam (ZOSYN) 3.375 g in 0.9% sodium chloride (MBP/ADV) 100 mL MBP  3.375 g IntraVENous Q8H    0.9% sodium chloride infusion 250 mL  250 mL IntraVENous PRN    insulin lispro (HUMALOG) injection   SubCUTAneous Q4H    latanoprost (XALATAN) 0.005 % ophthalmic solution 1 Drop  1 Drop Right Eye QPM    gabapentin (NEURONTIN) capsule 100 mg  100 mg Oral DAILY    0.9% sodium chloride infusion  50 mL/hr IntraVENous CONTINUOUS    0.9% sodium chloride infusion 250 mL  250 mL IntraVENous PRN    zinc oxide-white petrolatum 20-75 % topical paste   Topical BID    lidocaine (XYLOCAINE) 4 % (40 mg/mL) topical solution   Topical PRN    [Held by provider] rivaroxaban (XARELTO) tablet 2.5 mg  2.5 mg Oral BID WITH MEALS    [Held by provider] atorvastatin (LIPITOR) tablet 80 mg  80 mg Oral DAILY    labetaloL (NORMODYNE;TRANDATE) 20 mg/4 mL (5 mg/mL) injection 10 mg  10 mg IntraVENous Q6H PRN    sodium chloride (NS) flush 5-40 mL  5-40 mL IntraVENous Q8H    sodium chloride (NS) flush 5-40 mL  5-40 mL IntraVENous PRN    brimonidine (ALPHAGAN) 0.2 % ophthalmic solution 1 Drop  1 Drop Both Eyes TID    ferrous sulfate tablet 325 mg  325 mg Oral BID    glucose chewable tablet 16 g  4 Tablet Oral PRN    glucagon (GLUCAGEN) injection 1 mg  1 mg IntraMUSCular PRN    dextrose 10% infusion 125-250 mL  125-250 mL IntraVENous PRN    metoprolol succinate (TOPROL-XL) XL tablet 50 mg  50 mg Oral DAILY    amLODIPine (NORVASC) tablet 10 mg  10 mg Oral DAILY     Objective:     Patient Vitals for the past 8 hrs:   BP Temp Pulse Resp SpO2   03/01/22 0700 (!) 144/51 99.7 °F (37.6 °C) 70 16 100 %   03/01/22 0600 (!) 154/57 -- 72 16 100 %   03/01/22 0500 (!) 141/51 -- 71 21 100 %   03/01/22 0400 (!) 155/56 -- 70 16 100 %   03/01/22 0300 (!) 167/59 99.5 °F (37.5 °C) 64 18 100 %   03/01/22 0200 (!) 164/67 -- 69 19 100 %   03/01/22 0100 (!) 169/59 -- 69 19 100 %   03/01/22 0000 (!) 170/66 -- 71 19 100 %     03/01 0701 - 03/01 1900  In: -   Out: 1300 [Urine:1300]  02/27 1901 - 03/01 0700  In: 63580   Out: 37083 [Urine:06194]    Lab Review   Recent Results (from the past 24 hour(s))   GLUCOSE, POC    Collection Time: 02/28/22 11:02 AM   Result Value Ref Range    Glucose (POC) 272 (H) 65 - 117 mg/dL    Performed by [address] [place] [address], POC    Collection Time: 02/28/22  3:19 PM   Result Value Ref Range    Glucose (POC) 279 (H) 65 - 117 mg/dL    Performed by [address] [place] [address], POC    Collection Time: 02/28/22  6:24 PM   Result Value Ref Range    Glucose (POC) 205 (H) 65 - 117 mg/dL    Performed by [address] [place] [address], POC    Collection Time: 02/28/22 10:31 PM   Result Value Ref Range    Glucose (POC) 225 (H) 65 - 117 mg/dL    Performed by [address] [place] [address], POC    Collection Time: 03/01/22  2:21 AM   Result Value Ref Range    Glucose (POC) 228 (H) 65 - 117 mg/dL    Performed by Kriss Barnhart    RENAL FUNCTION PANEL    Collection Time: 03/01/22  3:30 AM Result Value Ref Range    Sodium 147 (H) 136 - 145 mmol/L    Potassium 4.4 3.5 - 5.1 mmol/L    Chloride 113 (H) 97 - 108 mmol/L    CO2 29 21 - 32 mmol/L    Anion gap 5 5 - 15 mmol/L    Glucose 231 (H) 65 - 100 mg/dL    BUN 39 (H) 6 - 20 mg/dL    Creatinine 1.62 (H) 0.55 - 1.02 mg/dL    BUN/Creatinine ratio 24 (H) 12 - 20      GFR est AA 38 (L) >60 ml/min/1.73m2    GFR est non-AA 31 (L) >60 ml/min/1.73m2    Calcium 8.4 (L) 8.5 - 10.1 mg/dL    Phosphorus 2.4 (L) 2.6 - 4.7 mg/dL    Albumin 1.6 (L) 3.5 - 5.0 g/dL   CBC WITH AUTOMATED DIFF    Collection Time: 03/01/22  3:30 AM   Result Value Ref Range    WBC 15.2 (H) 3.6 - 11.0 K/uL    RBC 2.50 (L) 3.80 - 5.20 M/uL    HGB 7.3 (L) 11.5 - 16.0 g/dL    HCT 22.9 (L) 35.0 - 47.0 %    MCV 91.6 80.0 - 99.0 FL    MCH 29.2 26.0 - 34.0 PG    MCHC 31.9 30.0 - 36.5 g/dL    RDW 16.4 (H) 11.5 - 14.5 %    PLATELET 540 298 - 043 K/uL    MPV 11.4 8.9 - 12.9 FL    NRBC 1.4 (H) 0.0  WBC    ABSOLUTE NRBC 0.22 (H) 0.00 - 0.01 K/uL    NEUTROPHILS 81 (H) 32 - 75 %    LYMPHOCYTES 14 12 - 49 %    MONOCYTES 3 (L) 5 - 13 %    EOSINOPHILS 1 0 - 7 %    BASOPHILS 0 0 - 1 %    IMMATURE GRANULOCYTES 1 (H) 0 - 0.5 %    ABS. NEUTROPHILS 12.4 (H) 1.8 - 8.0 K/UL    ABS. LYMPHOCYTES 2.1 0.8 - 3.5 K/UL    ABS. MONOCYTES 0.4 0.0 - 1.0 K/UL    ABS. EOSINOPHILS 0.1 0.0 - 0.4 K/UL    ABS. BASOPHILS 0.0 0.0 - 0.1 K/UL    ABS. IMM. GRANS. 0.2 (H) 0.00 - 0.04 K/UL    DF AUTOMATED     C REACTIVE PROTEIN, QT    Collection Time: 03/01/22  3:30 AM   Result Value Ref Range    C-Reactive protein 10.30 (H) 0.00 - 0.60 mg/dL   PROCALCITONIN    Collection Time: 03/01/22  3:30 AM   Result Value Ref Range    Procalcitonin 11.83 (H) 0 ng/mL   GLUCOSE, POC    Collection Time: 03/01/22  6:00 AM   Result Value Ref Range    Glucose (POC) 224 (H) 65 - 117 mg/dL    Performed by Sharon White      Additional comments: MRI of the brain shows right paracentral lobule infarct    NEUROLOGICAL EXAM:  Appearance:   The patient is well developed, drowsy and lethargic, but arousable. She said good in response to how she was doing. Mental Status: Could not get much answers, per nurse earlier she knew she was in the hospital.   Cranial Nerves:   Intact visual fields. GUMARO, EOM's full, no nystagmus, no ptosis. Facial movement is symmetric. Motor:   Moves all extremities to deep pain may be left side slightly weaker than the right. Reflexes:   Deep tendon reflexes not checked. Sensory:   Normal to touch, pinprick and vibration. Gait:   Cannot be checked. Tremor:   No tremor noted. Cerebellar:   Could not be checked. Neurovascular:  Normal heart sounds and regular rhythm     Assessment:   1. Acute infarction left paracentral lobule and region of the white matter of the left motor cortex: This is the cause of her new right-sided weakness. 2.  Lethargy/encephalopathy: She is about the same, still very slow in her responses. She has diabetic neuropathy which causes neuropathic pain. She is supposed to be taking gabapentin and will increase the dose to 100 mg 3 times daily once she starts waking up. 3.  Hypertension. 4.  Diabetes mellitus  Plan:   1.  Gabapentin is on hold for now. Once she starts becoming more awake we'll start at 100 mg 3 times daily. 2.  Please give Ativan 1 mg only if she goes into a grand mal seizure lasting for more than 3 minutes.     Thank you,    Signed:  Lila Amado MD  3/1/2022  11:46 AM

## 2022-03-01 NOTE — PROGRESS NOTES
SPEECH LANGUAGE PATHOLOGY DYSPHAGIA TREATMENT  Patient: Cassandra Nina (11 y.o. female)  Date: 3/1/2022  Diagnosis: CVA (cerebral vascular accident) ([facility] [address] [I63.9]  Elevated troponin [R77.8]      Precautions:      ASSESSMENT :  Patient w/ eyes open to voice and stimuli, intermittently alert, stated first name, and follows oral motor commands w/ mild groping. Concerns for apraxia present. Oral care performed patient w/ dry mucosa. PO trials of ice chips x2 and moderately thick via 1/4 tsp x4, and cold oral stim provided. Patient w/ appropriate bolus acceptance and attempts for labial closure. Reduced lingual ROM and strength w/ anterior and lateral spillage. Patient unable to maintain adequate labial seal to control ice chip trials. Prolonged A-P transit w/ moderately thick trials. Moderate swallow delay w/ moderately thick trials w/ weak HLE upon palpation. Patient is not appropriate for PO intake at this time. Patient will benefit from skilled intervention to address the above impairments. Patients rehabilitation potential is considered to be Guarded     PLAN :  Recommendations and Planned Interventions:  Rec cont NPO w/ TF via NGT. ST to follow for continued dysphagia tx. Frequency/Duration: Patient will be followed by speech-language pathology 5 times a week to address goals. Discharge Recommendations: Skilled Nursing Facility     SUBJECTIVE:   RN reports tolerating TF via NGT. MD informed patient PEG may be warranted.      OBJECTIVE:     Past Medical History:   Diagnosis Date    Diabetes mellitus ([facility] [address]     HTN (hypertension)     Hyperlipidemia     Neuropathy     PAD (peripheral artery disease) (HCC)     PCI    Sciatica        CXR Results  (Last 48 hours)      None            Current Diet:  DIET NPO  DIET ADULT TUBE FEEDING     Cognitive and Communication Status:  Neurologic State: Mayda Basket open to stimulus,Eyes open to voice  Orientation Level: Oriented to person,Other (Comment) (stated first name, nodded head appropriately for full name)  Cognition: Decreased attention/concentration,Decreased command following  Perception: Appears intact  Perseveration: No perseveration noted  Safety/Judgement: Decreased insight into deficits            Pain:  Pain Scale 1: Adult Nonverbal Pain Scale  Pain Intensity 1: 0       After treatment:   Patient left in no apparent distress in bed, Call bell within reach, Nursing notified, and Caregiver / family present    COMMUNICATION/EDUCATION:   Family friend educated on [address] [facility] [address] [place].      Thank you for this referral.  Chela Mandujano M.S., M.Ed., CCC-SLP  Time Calculation: 15 mins

## 2022-03-01 NOTE — CONSULTS
[facility] CONSULT NOTE    Subjective:         Date of Consultation: February 28, 2022     Referring Physician: Frantz Perez MD    Patient is a 78 y.o. female who is being seen for left great toe ulcer. Patient has a hx of diabetes mellitus, hypertension, hyperlipidemia, PAD and sciatica. Patient is currently sedated and nonverbal in the ICU. HPI gleaned via the admit H&P. Patient was brought in by EMS as her daughter noted a change in mental status.     Patient Active Problem List    Diagnosis Date Noted    CVA (cerebral vascular accident) ([address] 02/21/2022    Elevated troponin 02/21/2022    Ischemia of both lower extremities 07/22/2020    Pain in both lower legs 07/22/2020    Acute osteomyelitis of ankle or foot ([address] 04/04/2019    Diabetic peripheral neuropathy ([address] 04/04/2019    Spinal stenosis in cervical region 04/04/2019    Type 2 diabetes mellitus with nephropathy ([address] 01/31/2018    DM type 2 causing vascular disease ([address] 07/27/2017    Hypercalcemia 12/12/2016    PAD (peripheral artery disease) ([address] 04/11/2016    Infection of nailbed of toe of left foot 09/10/2015    Diabetes mellitus with neurological manifestations, uncontrolled ([address] 08/08/2013    HTN (hypertension)     Hyperlipidemia     Neuropathy     Sciatica      Past Medical History:   Diagnosis Date    Diabetes mellitus ([address]     HTN (hypertension)     Hyperlipidemia     Neuropathy     PAD (peripheral artery disease) (HCC)     PCI    Sciatica       Past Surgical History:   Procedure Laterality Date    HX AMPUTATION Right     great toe    HX CERVICAL FUSION      HX OTHER SURGICAL Bilateral     Stent placement    HX OTHER SURGICAL      HX VASCULAR STENT Bilateral     2 in right 1 in left    HX VASCULAR STENT Bilateral       Family History   Problem Relation Age of Onset    Cancer Mother     Diabetes Mother     Hypertension Mother       Social History     Tobacco Use    Smoking status: Never Smoker    Smokeless tobacco: Never Used   Substance Use Topics    Alcohol use: Not Currently     Current Facility-Administered Medications   Medication Dose Route Frequency Provider Last Rate Last Admin    piperacillin-tazobactam (ZOSYN) 3.375 g in 0.9% sodium chloride (MBP/ADV) 100 mL MBP  3.375 g IntraVENous Q8H Fred Barnes MD 25 mL/hr at 02/28/22 1311 3.375 g at 02/28/22 1311    0.9% sodium chloride infusion 250 mL  250 mL IntraVENous PRN Cintia Soto MD       [facility] insulin lispro (HUMALOG) injection   SubCUTAneous Q4H Israel Soto MD   6 Units at 02/28/22 1829    latanoprost (XALATAN) 0.005 % ophthalmic solution 1 Drop  1 Drop Right Eye QPM Israel Soto MD   1 Drop at 02/28/22 1830    gabapentin (NEURONTIN) capsule 100 mg  100 mg Oral DAILY Edwar JACKSON MD   100 mg at 02/28/22 0800    0.9% sodium chloride infusion  50 mL/hr IntraVENous CONTINUOUS Angel Dorantes MD 50 mL/hr at 02/28/22 0535 50 mL/hr at 02/28/22 0535    0.9% sodium chloride infusion 250 mL  250 mL IntraVENous PRN Chely Louise MD        zinc oxide-white petrolatum 20-75 % topical paste   Topical BID Edwar JACKSON MD   Given at 02/28/22 0900    lidocaine (XYLOCAINE) 4 % (40 mg/mL) topical solution   Topical PRN Edwar JACKSON MD   Given at 02/26/22 2207    [Held by provider] rivaroxaban (XARELTO) tablet 2.5 mg  2.5 mg Oral BID WITH MEALS Jabier Zhao MD   2.5 mg at 02/24/22 1720    [Held by provider] atorvastatin (LIPITOR) tablet 80 mg  80 mg Oral DAILY Jabier Zhao MD   80 mg at 02/26/22 0900    labetaloL (NORMODYNE;TRANDATE) 20 mg/4 mL (5 mg/mL) injection 10 mg  10 mg IntraVENous Q6H PRN Jabier Zhao MD        sodium chloride (NS) flush 5-40 mL  5-40 mL IntraVENous Q8H Orestes Norman MD   10 mL at 02/28/22 1313    sodium chloride (NS) flush 5-40 mL  5-40 mL IntraVENous PRN Edwar JACKSON MD        brimonidine (ALPHAGAN) 0.2 % ophthalmic solution 1 Drop  1 Drop Both Eyes TID Emerson, Francisco Chavis MD   1 Drop at 22 1522    ferrous sulfate tablet 325 mg  325 mg Oral BID Gurpreet JACKSON MD   325 mg at 22 0800    glucose chewable tablet 16 g  4 Tablet Oral PRN Prudencio Rodrigues MD        glucagon (GLUCAGEN) injection 1 mg  1 mg IntraMUSCular PRN Gurpreet JACKSON MD        dextrose 10% infusion 125-250 mL  125-250 mL IntraVENous PRN Prudencio Rodrigues MD        metoprolol succinate (TOPROL-XL) XL tablet 50 mg  50 mg Oral DAILY Cathy Parra MD   50 mg at 22 0800    amLODIPine (NORVASC) tablet 10 mg  10 mg Oral DAILY Cathy Parra MD   10 mg at 22 0800      No Known Allergies     Review of Systems:    Unable to accurately obtain due to patient's current medical status    Objective:     Patient Vitals for the past 8 hrs:   BP Temp Pulse Resp SpO2 Height   22 1808 (!) 179/67 -- 76 19 100 % --   22 1709 (!) 184/83 -- 75 19 100 % --   22 1601 (!) 137/47 -- 69 19 100 % --   22 1500 (!) 140/98 100 °F (37.8 °C) 71 17 100 % --   22 1400 (!) 157/50 -- 73 20 100 % --   22 1300 (!) 141/62 -- 72 16 100 % --   22 1247 -- -- -- -- -- 5' 7.01\" (1.702 m)   22 1200 (!) 135/39 -- 70 15 100 % --     Temp (24hrs), Av.6 °F (37.6 °C), Min:99.2 °F (37.3 °C), Max:100.1 °F (37.8 °C)      Physical Exam:    GEN: Pt is in NAD. No dressing noted to the B/L feet. No family noted at [place]. Bunny boots noted to be in place  DERM: Wound noted to the left great toe. Malodor and purulence noted. +PTB. No proximal lymphatic streaking noted  VASC: Pedal pulses (DP/PT) palpable to B/L LE. CFT<3sec to all digits of B/L LE. No pedal hair growth noted to the level of the digits for B/L LE. Skin temp is warm to warm from proximal to distal for B/L LE.   NEURO: Unable to accurately assess due to current medical status  MSK: No gross deformities.  Good muscle tone and bulk noted to B/L LE.  PSYCH: Sedated      Lab Review:   Recent Results (from the past 24 hour(s))   GLUCOSE, POC    Collection Time: 02/27/22  7:19 PM   Result Value Ref Range    Glucose (POC) 291 (H) 65 - 117 mg/dL    Performed by Terry Murdock    GLUCOSE, POC    Collection Time: 02/27/22 11:27 PM   Result Value Ref Range    Glucose (POC) 265 (H) 65 - 117 mg/dL    Performed by [address], POC    Collection Time: 02/28/22  3:44 AM   Result Value Ref Range    Glucose (POC) 215 (H) 65 - 117 mg/dL    Performed by Crissy Degroot    CBC WITH AUTOMATED DIFF    Collection Time: 02/28/22  5:34 AM   Result Value Ref Range    WBC 20.0 (H) 3.6 - 11.0 K/uL    RBC 2.55 (L) 3.80 - 5.20 M/uL    HGB 7.4 (L) 11.5 - 16.0 g/dL    HCT 22.4 (L) 35.0 - 47.0 %    MCV 87.8 80.0 - 99.0 FL    MCH 29.0 26.0 - 34.0 PG    MCHC 33.0 30.0 - 36.5 g/dL    RDW 16.1 (H) 11.5 - 14.5 %    PLATELET 659 561 - 893 K/uL    MPV 11.3 8.9 - 12.9 FL    NRBC 2.0 (H) 0.0  WBC    ABSOLUTE NRBC 0.40 (H) 0.00 - 0.01 K/uL    NEUTROPHILS 85 (H) 32 - 75 %    LYMPHOCYTES 11 (L) 12 - 49 %    MONOCYTES 2 (L) 5 - 13 %    EOSINOPHILS 1 0 - 7 %    BASOPHILS 0 0 - 1 %    IMMATURE GRANULOCYTES 1 (H) 0 - 0.5 %    ABS. NEUTROPHILS 17.1 (H) 1.8 - 8.0 K/UL    ABS. LYMPHOCYTES 2.2 0.8 - 3.5 K/UL    ABS. MONOCYTES 0.3 0.0 - 1.0 K/UL    ABS. EOSINOPHILS 0.1 0.0 - 0.4 K/UL    ABS. BASOPHILS 0.1 0.0 - 0.1 K/UL    ABS. IMM.  GRANS. 0.3 (H) 0.00 - 0.04 K/UL    DF AUTOMATED     RENAL FUNCTION PANEL    Collection Time: 02/28/22  5:34 AM   Result Value Ref Range    Sodium 146 (H) 136 - 145 mmol/L    Potassium 4.0 3.5 - 5.1 mmol/L    Chloride 112 (H) 97 - 108 mmol/L    CO2 30 21 - 32 mmol/L    Anion gap 4 (L) 5 - 15 mmol/L    Glucose 194 (H) 65 - 100 mg/dL    BUN 53 (H) 6 - 20 mg/dL    Creatinine 1.76 (H) 0.55 - 1.02 mg/dL    BUN/Creatinine ratio 30 (H) 12 - 20      GFR est AA 34 (L) >60 ml/min/1.73m2    GFR est non-AA 28 (L) >60 ml/min/1.73m2    Calcium 8.5 8.5 - 10.1 mg/dL    Phosphorus 2.6 2.6 - 4.7 mg/dL    Albumin 1.7 (L) 3.5 - 5.0 g/dL METABOLIC PANEL, COMPREHENSIVE    Collection Time: 02/28/22  5:34 AM   Result Value Ref Range    Sodium 146 (H) 136 - 145 mmol/L    Potassium 4.0 3.5 - 5.1 mmol/L    Chloride 110 (H) 97 - 108 mmol/L    CO2 30 21 - 32 mmol/L    Anion gap 6 5 - 15 mmol/L    Glucose 191 (H) 65 - 100 mg/dL    BUN 54 (H) 6 - 20 mg/dL    Creatinine 1.78 (H) 0.55 - 1.02 mg/dL    BUN/Creatinine ratio 30 (H) 12 - 20      GFR est AA 34 (L) >60 ml/min/1.73m2    GFR est non-AA 28 (L) >60 ml/min/1.73m2    Calcium 8.4 (L) 8.5 - 10.1 mg/dL    Bilirubin, total 0.3 0.2 - 1.0 mg/dL    AST (SGOT) 317 (H) 15 - 37 U/L    ALT (SGPT) 1,570 (H) 12 - 78 U/L    Alk. phosphatase 151 (H) 45 - 117 U/L    Protein, total 6.0 (L) 6.4 - 8.2 g/dL    Albumin 1.7 (L) 3.5 - 5.0 g/dL    Globulin 4.3 (H) 2.0 - 4.0 g/dL    A-G Ratio 0.4 (L) 1.1 - 2.2     HEPATITIS PANEL, ACUTE    Collection Time: 02/28/22  5:34 AM   Result Value Ref Range    Hepatitis A, IgM NONREACTIVE NONREACTIVE      __        Hepatitis B surface Ag <0.10 Index    Hep B surface Ag Interp. Negative Negative    __        Hepatitis B core, IgM NONREACTIVE NONREACTIVE    __        Hepatitis C virus Ab >11.00 Index    Hep C virus Ab Interp.  Reactive (A) NONREACTIVE     PROTHROMBIN TIME + INR    Collection Time: 02/28/22  5:34 AM   Result Value Ref Range    Prothrombin time 16.3 (H) 11.9 - 14.6 sec    INR 1.4 (H) 0.9 - 1.1     GLUCOSE, POC    Collection Time: 02/28/22  6:56 AM   Result Value Ref Range    Glucose (POC) 203 (H) 65 - 117 mg/dL    Performed by [address] [place] [address], POC    Collection Time: 02/28/22 11:02 AM   Result Value Ref Range    Glucose (POC) 272 (H) 65 - 117 mg/dL    Performed by [address], POC    Collection Time: 02/28/22  3:19 PM   Result Value Ref Range    Glucose (POC) 279 (H) 65 - 117 mg/dL    Performed by [address] [place] [address], POC    Collection Time: 02/28/22  6:24 PM   Result Value Ref Range    Glucose (POC) 205 (H) 65 - 117 mg/dL    Performed by Ruben Tao        Impression:     Left great toe ulcer  Uncontrolled DM T2    Recommendation:     Patient seen and evaluated at bedside in the ICU  - Current labs personally reviewed  - A sharp/excisional wound debridement was performed with a wound courette down to the level of bone (with bone taken) to the wound noted to the distal aspect of the left great toe. The total area debrided measured 6 cm². Upon completion, bleeding/granular tissue was noted with exposed bone. Appropriate wound care performed. Wound care orders placed in epic  -Strict offloading for wound healing purposes and ulcer prevention  - Wound culture taken and will tailor antibiotics as needed  - XR ordered to the left foot to evaluate for extent of infection  - I will follow closely    Thank you for the consult! Victor Hugo Garcias, [facility], [address] [place] [address] and [facility]  [address] [place] [address]  [facility]:  [phone]  F:  [phone]  C:  [phone]

## 2022-03-01 NOTE — PROGRESS NOTES
Infectious Disease Progress Note               Subjective:   Pt seen and examined at bedside. Remains unresponsive, not following commands.  In the ICU for close monitoring, not requiringpressor support, daughter at bedside   Objective:   Physical Exam:     Visit Vitals  BP (!) 142/62 (BP 1 Location: Left upper arm, BP Patient Position: At rest)   Pulse 70   Temp 99.8 °F (37.7 °C)   Resp 16   Ht 5' 7.01\" (1.702 m)   Wt 181 lb 3.5 oz (82.2 kg)   SpO2 96%   BMI 28.38 kg/m²    O2 Flow Rate (L/min): 1 l/min (placed on room air) O2 Device: Nasal cannula    Temp (24hrs), Av.7 °F (37.6 °C), Min:99.4 °F (37.4 °C), Max:100.1 °F (37.8 °C)    701 -  190  In: 5550   Out: 2964 [Urine:5500]   1901 -  0700  In: 94889   Out: 27654 [Urine:43328]    General: NAD, alert, non-verbal, not following commands   HEENT: GUMARO, Moist mucosa   Lungs: CTA b/l, decreased at the bases, no wheeze/rhonchi   Heart: S1S2+, RRR, no murmur  Abdo: Soft, NT, ND, +BS   :+ indwelling giraldo cath    Exts: Ischemic changes involving left great toe   Skin: No pressure ulcer      Data Review:       Recent Days:  Recent Labs     22  03322  0534 22  0525   WBC 15.2* 20.0* 22.5*   HGB 7.3* 7.4* 6.7*   HCT 22.9* 22.4* 19.7*    313 349     Recent Labs     22  0330 22  0534 22  0525   BUN 39* 54*  53* 78*   CREA 1.62* 1.78*  1.76* 2.61*       Lab Results   Component Value Date/Time    C-Reactive protein 10.30 (H) 2022 03:30 AM          Microbiology     Results     Procedure Component Value Units Date/Time    CULTURE, Peter Reas STAIN [[id number]] Collected: 22    Order Status: Completed Specimen: Wound Updated: 22 6299     Special Requests: No Special Requests        GRAM STAIN 2+ Gram Negative Rods         2+ Gram Positive Cocci        Culture result: PENDING    CULTURE, BLOOD [[id number]] Collected: 22 1230    Order Status: Completed Specimen: Blood Updated: 03/01/22 1046     Special Requests: No Special Requests        Culture result: No growth 4 days       CULTURE, URINE [[id number]]  (Abnormal)  (Susceptibility) Collected: 02/23/22 1100    Order Status: Completed Specimen: Urine Updated: 02/26/22 1133     Special Requests: No Special Requests        [place] Count --        >100,000  colonies/ml       Culture result: Escherichia coli       Susceptibility      Escherichia coli     GEMA     Amikacin ($) Susceptible     Ampicillin ($) Resistant     Ampicillin/sulbactam ($) Resistant     Cefazolin ($) Susceptible     Cefepime ($$) Susceptible     Cefoxitin Susceptible     Ceftazidime ($) Susceptible     Ceftriaxone ($) Susceptible     Ciprofloxacin ($) Susceptible     Gentamicin ($) Resistant     Levofloxacin ($) Susceptible     Meropenem ($$) Susceptible     Nitrofurantoin Susceptible     Piperacillin/Tazobac ($) Susceptible     Tobramycin ($) Intermediate     Trimeth/Sulfa Resistant                  Linear View                            Diagnostics   CXR Results  (Last 48 hours)    None             Assessment/Plan     1. UTI complicated by hematuria, suspect underlying neurogenic bladder from recent CVA       E. coli isolated from urine culture on 2/23, blood Cx from 2/25 staying negative        Afebrile, leukocytosis trending down on todays labs        On Day # 2 of Zosyn, s/p 5 days of Ceftriaxone        Routine labs in the morning      2. Positive hep C serology, daughter denies h/o IVDA or other risk factors for hep C       Hep C RNA VL is pending, may take a wk      Treatment as out pt if chronic hep C confirmed per work up      3. Recently CVA, w right sided weakness, remains aphasic     4. Acute blood loss anemia: Hgb of 7.4 up from 6.7 on 02/27     5. PAD s/p amp of right great toe, stable ischemic changes      6.  Acute renal failure: Cr trending down on todays labs      Ulises Dillard MD    3/1/2022

## 2022-03-02 NOTE — PROGRESS NOTES
Progress Note    Patient: Fransisco Fowler MRN: [id number]  SSN: [id number]    YOB: 1945  Age: 78 y.o. Sex: female      Admit Date: 2/21/2022    LOS: 5 days     Subjective:     78years old with recurrent acute severe, acute kidney failure on chronic kidney failure stage 3b UTI, sepsis, metabolic encephalopathy, possible non-STEMI, hepatitis C. With elevated liver enzymes trending downwards. Patient is lethargic but responsive to verbal command. Objective:     Vitals:    03/02/22 0400 03/02/22 0500 03/02/22 0600 03/02/22 0700   BP: (!) 160/57 (!) 163/80 (!) 155/52 (!) 160/74   Pulse: 68 69 72 79   Resp: 16 16 13 16   Temp:    99 °F (37.2 °C)   SpO2: 100% 100% 99% 98%   Weight:       Height:            Intake and Output:  Current Shift: 03/02 0701 - 03/02 1900  In: -   Out: 1600 [[facility]:7158]  Last three shifts: 02/28 1901 - 03/02 0700  In: Marina Hudson 53   Out: 06808 [Urine:36037]    Physical Exam:   General:   Patient is lethargic   Eyes:  Conjunctivae/corneas clear. PERRL, EOMs intact. Fundi benign   Ears:  Normal TMs and external ear canals both ears. Nose: Nares normal. Septum midline. Mucosa normal. No drainage or sinus tenderness. Mouth/Throat: Lips, mucosa, and tongue normal. Teeth and gums normal.   Neck: Supple, symmetrical, trachea midline, no adenopathy, thyroid: no enlargment/tenderness/nodules, no carotid bruit and no JVD. Back:   Symmetric, no curvature. ROM normal. No CVA tenderness. Lungs:   Clear to auscultation bilaterally. Heart:  Regular rate and rhythm, S1, S2 normal, no murmur, click, rub or gallop. Abdomen:   Soft, non-tender. Bowel sounds normal. No masses,  No organomegaly. Extremities: Extremities lethargic, no cyanosis or edema. Pulses: 2+ and symmetric all extremities. Skin: Skin color, texture, turgor normal. No rashes or lesions   Lymph nodes:     Neurologic:  Patient is lethargic       Lab/Data Review: All lab results for the last 24 hours reviewed. Recent Results (from the past 24 hour(s))   GLUCOSE, POC    Collection Time: 03/01/22 11:17 AM   Result Value Ref Range    Glucose (POC) 295 (H) 65 - 117 mg/dL    Performed by Francine Salt    HEPATIC FUNCTION PANEL    Collection Time: 03/01/22  3:15 PM   Result Value Ref Range    Protein, total 6.2 (L) 6.4 - 8.2 g/dL    Albumin 1.6 (L) 3.5 - 5.0 g/dL    Globulin 4.6 (H) 2.0 - 4.0 g/dL    A-G Ratio 0.3 (L) 1.1 - 2.2      Bilirubin, total 0.4 0.2 - 1.0 mg/dL    Bilirubin, direct 0.2 0.0 - 0.2 mg/dL    Alk.  phosphatase 130 (H) 45 - 117 U/L    AST (SGOT) 107 (H) 15 - 37 U/L    ALT (SGPT) 788 (H) 12 - 78 U/L   PROTHROMBIN TIME + INR    Collection Time: 03/01/22  3:15 PM   Result Value Ref Range    Prothrombin time 17.3 (H) 11.9 - 14.6 sec    INR 1.5 (H) 0.9 - 1.1     GLUCOSE, POC    Collection Time: 03/01/22  3:46 PM   Result Value Ref Range    Glucose (POC) 231 (H) 65 - 117 mg/dL    Performed by [address] [place] [address], POC    Collection Time: 03/01/22  6:38 PM   Result Value Ref Range    Glucose (POC) 268 (H) 65 - 117 mg/dL    Performed by Francine Salt    GLUCOSE, POC    Collection Time: 03/01/22 10:08 PM   Result Value Ref Range    Glucose (POC) 227 (H) 65 - 117 mg/dL    Performed by [address] [place] [address], POC    Collection Time: 03/02/22  3:36 AM   Result Value Ref Range    Glucose (POC) 157 (H) 65 - 117 mg/dL    Performed by Brenna Conley 411    Collection Time: 03/02/22  4:00 AM   Result Value Ref Range    Phosphorus 2.7 2.6 - 4.7 mg/dL   GLUCOSE, POC    Collection Time: 03/02/22  5:34 AM   Result Value Ref Range    Glucose (POC) 156 (H) 65 - 117 mg/dL    Performed by [address] [place] [address], POC    Collection Time: 03/02/22  7:14 AM   Result Value Ref Range    Glucose (POC) 177 (H) 65 - 117 mg/dL    Performed by Gayle Sanchez      Liver enzymes   AST--> 3/1 107, 2/28 317, 2/27 1268  ALT--> 3/1 700, 2/28 1570, 2/27 2657  Alk Phos --> 3/1 130, 2/28 151, 2/27 152  inr 1.5,1.4  Assessment:     Active Problems:    CVA (cerebral vascular accident) ([facility] (2/21/2022)      Elevated troponin (2/21/2022)    Multiorgan failure discussed extensively with family members  Liver enzymes are trending downwards her INR is trending upwards bilirubin remains normal  Plan:     Called [facility] discussed case with hepatology  PEG tube placement and continue follow-up re consultation to Dr. Aundrea Whitman for continued GI and liver disease  Extensive family meeting yesterday  Signed By: Cassandra Nina MD     March 2, 2022

## 2022-03-02 NOTE — PROGRESS NOTES
Visited patient in ICU per staff request for distressed family. Patient, daughter Gala Klinefelter, and patient's sisters were present during the visit. Gala Klinefelter stated she is waiting to hear an update from a physician and seemed to process her emotions verbally and tearfully. Provided presence of support while exploring their needs as well as listening with empathy. Normalized their emotions and affirmed their familial bond and affection. Offered and provided prayer per their request and addvised of  availability. Contact chaplains for further referrals. Chaplain Brenda Rosas M.Div.    can be reached by calling the  at [facility]  [phone]

## 2022-03-02 NOTE — PROGRESS NOTES
Renal Daily Progress Note    Admit Date: 2/21/2022      Subjective:   She  opens her eyes but does not follow commands.       Current Facility-Administered Medications   Medication Dose Route Frequency    0.45% sodium chloride infusion  50 mL/hr IntraVENous CONTINUOUS    insulin glargine (LANTUS) injection 15 Units  15 Units SubCUTAneous QHS    piperacillin-tazobactam (ZOSYN) 3.375 g in 0.9% sodium chloride (MBP/ADV) 100 mL MBP  3.375 g IntraVENous Q8H    0.9% sodium chloride infusion 250 mL  250 mL IntraVENous PRN    insulin lispro (HUMALOG) injection   SubCUTAneous Q4H    latanoprost (XALATAN) 0.005 % ophthalmic solution 1 Drop  1 Drop Right Eye QPM    gabapentin (NEURONTIN) capsule 100 mg  100 mg Oral DAILY    0.9% sodium chloride infusion 250 mL  250 mL IntraVENous PRN    zinc oxide-white petrolatum 20-75 % topical paste   Topical BID    lidocaine (XYLOCAINE) 4 % (40 mg/mL) topical solution   Topical PRN    [Held by provider] rivaroxaban (XARELTO) tablet 2.5 mg  2.5 mg Oral BID WITH MEALS    [Held by provider] atorvastatin (LIPITOR) tablet 80 mg  80 mg Oral DAILY    labetaloL (NORMODYNE;TRANDATE) 20 mg/4 mL (5 mg/mL) injection 10 mg  10 mg IntraVENous Q6H PRN    sodium chloride (NS) flush 5-40 mL  5-40 mL IntraVENous Q8H    sodium chloride (NS) flush 5-40 mL  5-40 mL IntraVENous PRN    brimonidine (ALPHAGAN) 0.2 % ophthalmic solution 1 Drop  1 Drop Both Eyes TID    ferrous sulfate tablet 325 mg  325 mg Oral BID    glucose chewable tablet 16 g  4 Tablet Oral PRN    glucagon (GLUCAGEN) injection 1 mg  1 mg IntraMUSCular PRN    dextrose 10% infusion 125-250 mL  125-250 mL IntraVENous PRN    metoprolol succinate (TOPROL-XL) XL tablet 50 mg  50 mg Oral DAILY    amLODIPine (NORVASC) tablet 10 mg  10 mg Oral DAILY        Review of Systems    ROS   Not obtainable    Objective:     Patient Vitals for the past 8 hrs:   BP Temp Pulse Resp SpO2   03/02/22 1000 (!) (P) 156/55 -- -- (P) 16 (P) 98 % 03/02/22 0900 (!) (P) 130/56 -- -- (P) 16 (P) 97 %   03/02/22 0800 (!) (P) 158/51 -- -- (P) 17 (P) 98 %   03/02/22 0700 (!) 160/74 99 °F (37.2 °C) 79 16 98 %   03/02/22 0600 (!) 155/52 -- 72 13 99 %   03/02/22 0500 (!) 163/80 -- 69 16 100 %   03/02/22 0400 (!) 160/57 -- 68 16 100 %   03/02/22 0300 (!) 142/62 99.7 °F (37.6 °C) 68 15 99 %     03/02 0701 - 03/02 1900  In: 3000 [I.V.:3000]  Out: 4500 [Urine:4500]  02/28 1901 - 03/02 0700  In: 03696   Out: 93542 [Urine:54302]    Physical Exam:   Physical Exam  Cardiovascular:      Rate and Rhythm: Regular rhythm. Pulmonary:      Breath sounds: Normal breath sounds. Abdominal:      General: Bowel sounds are normal.      Palpations: Abdomen is soft. Trace edema  She is nonverbal  She continues on CBI. Urine clearing  Not withdrawing right upper extremity in response to noxious stimuli    XR FOOT LT AP/LAT   Final Result      DUPLEX UPPER EXT VENOUS RIGHT   Final Result      US ABD COMP   Final Result   Gallbladder sludge without other acute abnormality. XR CHEST PORT   Final Result   The cardiomediastinal silhouette is appropriate for age, technique,   and lung expansion. Pulmonary vasculature is not congested. The lungs are   essentially clear. No effusion or pneumothorax is seen. CT HEAD WO CONT   Final Result   Negative for acute intracranial process. CT ABD PELV WO CONT   Final Result   Hemorrhage within the urinary bladder. Mild left   hydroureteronephrosis. XR SWALLOW FUNC VIDEO   Final Result   Penetration with thin consistency. No aspiration. CTA HEAD NECK W WO CONT   Final Result   Negative head and neck CTA. Please note that all carotid bifurcation stenoses are measured as per NASCET   criteria. CT CODE NEURO HEAD WO CONTRAST   Final Result   1. No acute large vessel intracranial ischemia, hemorrhage. Called report to   patient's nurse Richard Hamlin at 7:12 PM 2/22/2022.   2. Right basal ganglia lacunar infarct. 3. Periventricular microangiopathy. 4. Pansinusitis. See above. MRI BRAIN WO CONT   Final Result   Acute infarction left paracentral lobule and region of the white   matter of the left motor cortex. CT CODE NEURO HEAD WO CONTRAST   Final Result      1. No acute intracranial hemorrhage. 2. Left basal ganglia lacunar infarct, which is age indeterminate in the absence   of any prior outside studies, but favored to be chronic. 3. Moderate to severe paranasal sinus disease. Results called to Dr. Karen Galvin at 9:39 PM on 2/21/2022           Data Review   Recent Labs     03/01/22 0330 02/28/22  0534   WBC 15.2* 20.0*   HGB 7.3* 7.4*   HCT 22.9* 22.4*    313     Recent Labs     03/02/22  0400 03/01/22  1515 03/01/22 0330 02/28/22  0534   NA  --   --  147* 146*  146*   K  --   --  4.4 4.0  4.0   CL  --   --  113* 110*  112*   CO2  --   --  29 30  30   GLU  --   --  231* 191*  194*   BUN  --   --  39* 54*  53*   CREA  --   --  1.62* 1.78*  1.76*   CA  --   --  8.4* 8.4*  8.5   PHOS 2.7  --  2.4* 2.6   ALB  --  1.6* 1.6* 1.7*  1.7*   ALT  --  788*  --  1,570*   INR  --  1.5*  --  1.4*     No components found for: GLPOC  No results for input(s): PH, PCO2, PO2, HCO3, FIO2 in the last 72 hours. Recent Labs     03/01/22  1515 02/28/22  0534   INR 1.5* 1.4*         Assessment:           Active Problems:    CVA (cerebral vascular accident) ([facility] [address] (2/21/2022)      Elevated troponin (2/21/2022)    Acute kidney injury improving. Creatinine 1.62 mg yesterday down from 3.09 mg over the weekend. No labs today as yet. Modest hypernatremia-labs pending    Elevated transaminases- improving -  GI following    Normocytic anemia    Acute CVA    Diabetes mellitus with peripheral neuropathy    Hematuria on CBI as per urology    E. coli urinary tract infection    Modest hypophosphatemia - corrected. .    Hypertension. She has been started on amlodipine and metoprolol. BP trending down.     Plan:   Unable to quantiate UOP due to CBI. I would assume that she is nonoliguric as the creatinine is stable.   Await todays labs

## 2022-03-02 NOTE — PROGRESS NOTES
Comprehensive Nutrition Assessment    Type and Reason for Visit: Reassess    Nutrition Recommendations/Plan:    TF recommendations via PEG:      Glucerna 1.5 at 55ml/hr with  a 125ml flush q3h        +prosource 1ce/day   =~2000kcal, ~130g protein, 2000ml water   *this meets kcal and protein needs  *increased water flush to aid in treatment of hypernatremia  *could transition to bolus at follow up     Nutrition Assessment:  Pt with CVA with right side weakness. Pt cont to remain lethargic and unable to really follow commands. slp eval 3/1- recomend cont NPO. Pt with NG. Pt with RICARDO- renal noted creat improving and adjusting to 1/2 noraml saline for hypernatremia. pt with UTI with hematuria and on abx. pts liver enzymes imrpvoing but INR cont to rise. pt with multisystem organ failure. Meds: 1/2 nacl at 50ml/hr, iron, lantus, ssi, zoysn, zinc Labs/l 3/2: Na 147, cl 115, k+ 4.5, BUN 34, creat 1.49, glucose 213   *pt got peg today. Malnutrition Assessment:  Malnutrition Status:  No malnutrition    Context:  Chronic illness     Findings of the 6 clinical characteristics of malnutrition:   Energy Intake:     Weight Loss: Body Fat Loss:   ,     Muscle Mass Loss:   ,    Fluid Accumulation:   ,     Strength:        Estimated Daily Nutrient Needs:  Energy (kcal): (P) 5685-9521 (25-30kcal/kg); Weight Used for Energy Requirements: Current  Protein (g): (P) 123 (1.5g/kg) - wounds and RICARDO imrpoving- unsure if pt has CKD.; Weight Used for Protein Requirements: Current  Fluid (ml/day): (P) 2500 (30ml/kg) - hypernatremia; Method Used for Fluid Requirements: ml/kg      Nutrition Related Findings:  (P) Last BM documetned was 2/25. Trace edema.  TF at 55ml/hr with 150ml flush      Wounds:    Multiple,Full thickness       Current Nutrition Therapies:  DIET NPO    Anthropometric Measures:  Height:  5' 7.01\" (170.2 cm)  Current Body Wt:  82.2 kg (181 lb 3.5 oz) (2/27)   Admission Body Wt:       Usual Body Wt:   (elkin) Ideal Body Wt:  135 lbs:  134.2 %   Adjusted Body Weight:   ; Weight Adjustment for: No adjustment   Adjusted BMI:       BMI Category: Overweight (BMI 25.0-29. 9)       Nutrition Diagnosis:   (P) Inadequate oral intake related to (P) cognitive or neurological impairment,swallowing difficulty as evidenced by (P) NPO or clear liquid status due to medical condition,swallowing study results,nutrition support-enteral nutrition    Nutrition Interventions:   Food and/or Nutrient Delivery: (P) Modify tube feeding  Nutrition Education and Counseling: (P) No recommendations at this time  Coordination of Nutrition Care: (P) Speech therapy    Goals:  (P) Meet >/=50% of EEN in 5 days (met, cont), Wt maintanence (monitor), signs of wound healing (monitor)       Nutrition Monitoring and Evaluation:   Behavioral-Environmental Outcomes: (P) None identified  Food/Nutrient Intake Outcomes: (P) Enteral nutrition intake/tolerance  Physical Signs/Symptoms Outcomes: (P) Diarrhea,Constipation,Chewing or swallowing,Skin,Fluid status or edema,Hemodynamic status    Discharge Planning:    (P) Too soon to determine     Electronically signed by Augusto Clarke RD on 3/2/2022 at 3:36 PM    Contact:

## 2022-03-02 NOTE — CONSULTS
UROLOGY CONSULT NOTE  [phone]        Patient: Misha Joe MRN: [id number]  SSN: [id number]    YOB: 1945  Age: 78 y.o.   Sex: female      REFERRING PROVIDER: noemi  Subjective:      Misha Joe is a 78 y.o. female who is being seen in urologic consultation for see note by Mackenzie Mathews-- this is her official cosign    Past Medical History:   Diagnosis Date    Diabetes mellitus ([facility] [address]     HTN (hypertension)     Hyperlipidemia     Neuropathy     PAD (peripheral artery disease) ([facility] [address]     PCI    Sciatica      Past Surgical History:   Procedure Laterality Date    HX AMPUTATION Right     great toe    HX CERVICAL FUSION      HX OTHER SURGICAL Bilateral     Stent placement    HX OTHER SURGICAL      HX VASCULAR STENT Bilateral     2 in right 1 in left    HX VASCULAR STENT Bilateral       Family History   Problem Relation Age of Onset    Cancer Mother     Diabetes Mother     Hypertension Mother      Social History     Tobacco Use    Smoking status: Never Smoker    Smokeless tobacco: Never Used   Substance Use Topics    Alcohol use: Not Currently      Current Facility-Administered Medications   Medication Dose Route Frequency Provider Last Rate Last Admin    0.45% sodium chloride infusion  50 mL/hr IntraVENous CONTINUOUS Kalpesh Lara MD 50 mL/hr at 03/01/22 1040 50 mL/hr at 03/01/22 1040    insulin glargine (LANTUS) injection 15 Units  15 Units SubCUTAneous QHS Ival Virgilio JACKSON MD   15 Units at 03/01/22 2126    piperacillin-tazobactam (ZOSYN) 3.375 g in 0.9% sodium chloride (MBP/ADV) 100 mL MBP  3.375 g IntraVENous Q8H Fred Barnes MD 25 mL/hr at 03/02/22 1224 3.375 g at 03/02/22 1224    0.9% sodium chloride infusion 250 mL  250 mL IntraVENous PRN Wendy Soto MD        insulin lispro (HUMALOG) injection   SubCUTAneous Q4H Israel Soto MD   3 Units at 03/02/22 1143    latanoprost (XALATAN) 0.005 % ophthalmic solution 1 Drop  1 Drop Right Eye QPM Leon Dasilva MD   1 Drop at 03/01/22 1830    gabapentin (NEURONTIN) capsule 100 mg  100 mg Oral DAILY Zayda Givens MD MANUEL   100 mg at 03/02/22 4085    0.9% sodium chloride infusion 250 mL  250 mL IntraVENous PRN Michael Louise MD        zinc oxide-white petrolatum 20-75 % topical paste   Topical BID Dennise Zhang MD   Given at 03/02/22 0900    lidocaine (XYLOCAINE) 4 % (40 mg/mL) topical solution   Topical PRN Dennise Zhang MD   Given at 02/26/22 2207    [Held by provider] rivaroxaban Verta Barillas) tablet 2.5 mg  2.5 mg Oral BID WITH MEALS Roxane Oden MD   2.5 mg at 02/24/22 1720    [Held by provider] atorvastatin (LIPITOR) tablet 80 mg  80 mg Oral DAILY Roxane Oden MD   80 mg at 02/26/22 0900    labetaloL (NORMODYNE;TRANDATE) 20 mg/4 mL (5 mg/mL) injection 10 mg  10 mg IntraVENous Q6H PRN Roxane Oden MD   10 mg at 03/02/22 1224    sodium chloride (NS) flush 5-40 mL  5-40 mL IntraVENous Q8H Dennise Zhang MD   10 mL at 03/02/22 0526    sodium chloride (NS) flush 5-40 mL  5-40 mL IntraVENous PRN Zayda Givens MD MANUEL        brimonidine (ALPHAGAN) 0.2 % ophthalmic solution 1 Drop  1 Drop Both Eyes TID Dennise Zhang MD   1 Drop at 03/02/22 0919    ferrous sulfate tablet 325 mg  325 mg Oral BID Dimock Givens MD MANUEL   325 mg at 03/02/22 0463    glucose chewable tablet 16 g  4 Tablet Oral PRN Dennise Zhang MD        glucagon (GLUCAGEN) injection 1 mg  1 mg IntraMUSCular PRN Dimock Givens MD MANUEL        dextrose 10% infusion 125-250 mL  125-250 mL IntraVENous PRN Dimock Givens MD MANUEL        metoprolol succinate (TOPROL-XL) XL tablet 50 mg  50 mg Oral DAILY Theodore Aguirre MD   50 mg at 03/02/22 7609    amLODIPine (NORVASC) tablet 10 mg  10 mg Oral DAILY Theodore Aguirre MD   10 mg at 03/02/22 9569     Facility-Administered Medications Ordered in Other Encounters   Medication Dose Route Frequency Provider Last Rate Last Admin    propofoL (DIPRIVAN) 10 mg/mL injection IntraVENous PRN Astrid Catena, CRNA   20 mg at 03/02/22 1438    lidocaine (PF) (XYLOCAINE) 20 mg/mL (2 %) injection   IntraVENous PRN Astrid Catvicki, CRNA   60 mg at 03/02/22 1423        No Known Allergies    Review of Systems:  ROS    Objective:     Vitals:    03/02/22 1300 03/02/22 1400 03/02/22 1500 03/02/22 1600   BP: (!) 155/58 (!) 160/53 (!) 133/55 (!) 160/59   Pulse:       Resp: 18 18 18 17   Temp:       SpO2: 97% 98% 98% 97%   Weight:       Height:            Recent Labs     03/01/22  0330 02/28/22  0534   WBC 15.2* 20.0*   HGB 7.3* 7.4*   HCT 22.9* 22.4*    313     Recent Labs     03/02/22  1019 03/02/22  0400 03/01/22  1515 03/01/22  0330 02/28/22  0534 02/28/22  0534   *  --   --  147*  --  146*  146*   K 4.5  --   --  4.4  --  4.0  4.0   *  --   --  113*  --  110*  112*   CO2 28  --   --  29  --  30  30   *  --   --  231*  --  191*  194*   BUN 34*  --   --  39*  --  54*  53*   CREA 1.49*  --   --  1.62*  --  1.78*  1.76*   CA 8.5  --   --  8.4*  --  8.4*  8.5   PHOS  --  2.7  --  2.4*  --  2.6   ALB 1.8*  --  1.6* 1.6*   < > 1.7*  1.7*   TBILI 0.6  --  0.4  --   --  0.3   *  --  788*  --   --  1,570*   INR 1.4*  --  1.5*  --   --  1.4*    < > = values in this interval not displayed. No results for input(s): PH, PCO2, PO2, HCO3, FIO2 in the last 72 hours.     24 Hour Results:  Recent Results (from the past 24 hour(s))   GLUCOSE, POC    Collection Time: 03/01/22  6:38 PM   Result Value Ref Range    Glucose (POC) 268 (H) 65 - 117 mg/dL    Performed by Zhang Willard, POC    Collection Time: 03/01/22 10:08 PM   Result Value Ref Range    Glucose (POC) 227 (H) 65 - 117 mg/dL    Performed by [address] [place] [address], POC    Collection Time: 03/02/22  3:36 AM   Result Value Ref Range    Glucose (POC) 157 (H) 65 - 117 mg/dL    Performed by Brenna Lopez    Collection Time: 03/02/22  4:00 AM   Result Value Ref Range    Phosphorus 2.7 2.6 - 4.7 mg/dL GLUCOSE, POC    Collection Time: 03/02/22  5:34 AM   Result Value Ref Range    Glucose (POC) 156 (H) 65 - 117 mg/dL    Performed by [address] [place] [address], POC    Collection Time: 03/02/22  7:14 AM   Result Value Ref Range    Glucose (POC) 177 (H) 65 - 117 mg/dL    Performed by Rose Stein    METABOLIC PANEL, BASIC    Collection Time: 03/02/22 10:19 AM   Result Value Ref Range    Sodium 147 (H) 136 - 145 mmol/L    Potassium 4.5 3.5 - 5.1 mmol/L    Chloride 115 (H) 97 - 108 mmol/L    CO2 28 21 - 32 mmol/L    Anion gap 4 (L) 5 - 15 mmol/L    Glucose 213 (H) 65 - 100 mg/dL    BUN 34 (H) 6 - 20 mg/dL    Creatinine 1.49 (H) 0.55 - 1.02 mg/dL    BUN/Creatinine ratio 23 (H) 12 - 20      GFR est AA 41 (L) >60 ml/min/1.73m2    GFR est non-AA 34 (L) >60 ml/min/1.73m2    Calcium 8.5 8.5 - 10.1 mg/dL   PROTHROMBIN TIME + INR    Collection Time: 03/02/22 10:19 AM   Result Value Ref Range    Prothrombin time 16.9 (H) 11.9 - 14.6 sec    INR 1.4 (H) 0.9 - 1.1     HEPATIC FUNCTION PANEL    Collection Time: 03/02/22 10:19 AM   Result Value Ref Range    Protein, total 6.4 6.4 - 8.2 g/dL    Albumin 1.8 (L) 3.5 - 5.0 g/dL    Globulin 4.6 (H) 2.0 - 4.0 g/dL    A-G Ratio 0.4 (L) 1.1 - 2.2      Bilirubin, total 0.6 0.2 - 1.0 mg/dL    Bilirubin, direct 0.2 0.0 - 0.2 mg/dL    Alk.  phosphatase 117 45 - 117 U/L    AST (SGOT) 62 (H) 15 - 37 U/L    ALT (SGPT) 569 (H) 12 - 78 U/L   GLUCOSE, POC    Collection Time: 03/02/22 10:50 AM   Result Value Ref Range    Glucose (POC) 211 (H) 65 - 117 mg/dL    Performed by Rose Stein    GLUCOSE, POC    Collection Time: 03/02/22  4:41 PM   Result Value Ref Range    Glucose (POC) 229 (H) 65 - 117 mg/dL    Performed by Noel Shi        Physical Exam:  Physical Exam      Assessment:     Hospital Problems  Date Reviewed: 3/2/2022          Codes Class Noted POA    CVA (cerebral vascular accident) [facility]) ICD-10-CM: I63.9  ICD-9-CM: 434.91  2/21/2022 Unknown        Elevated troponin ICD-10-CM: R77.8  ICD-9-CM: 790.6  2/21/2022 Unknown              Plan:     Hospital Problems  Date Reviewed: 3/2/2022          Codes Class Noted POA    CVA (cerebral vascular accident) [facility]) ICD-10-CM: I63.9  ICD-9-CM: 434.91  2/21/2022 Unknown        Elevated troponin ICD-10-CM: R77.8  ICD-9-CM: 790.6  2/21/2022 Unknown                  Signed By: Fernando Daniel MD     March 2, 2022

## 2022-03-02 NOTE — ANESTHESIA PREPROCEDURE EVALUATION
Relevant Problems   NEUROLOGY   (+) CVA (cerebral vascular accident) ([facility] [address]      CARDIOVASCULAR   (+) HTN (hypertension)   (+) PAD (peripheral artery disease) (HCC)      ENDOCRINE   (+) DM type 2 causing vascular disease (HCC)   (+) Type 2 diabetes mellitus with nephropathy (HCC)      HEMATOLOGY   (+) Acute osteomyelitis of ankle or foot (HCC)       Anesthetic History   No history of anesthetic complications            Review of Systems / Medical History  Patient summary reviewed, nursing notes reviewed and pertinent labs reviewed    Pulmonary  Within defined limits                 Neuro/Psych       CVA  TIA    Comments: Metabolic Encephalopathy  Peripheral Neuropathy Cardiovascular    Hypertension          Past MI, CAD, PAD and hyperlipidemia         GI/Hepatic/Renal       Hepatitis: type C  Renal disease (Creatinine 1.49): CRI       Endo/Other    Diabetes: type 2, using insulin    Blood dyscrasia (INR 1.4) and anemia     Other Findings            Physical Exam    Airway  Mallampati: III  TM Distance: 4 - 6 cm  Neck ROM: normal range of motion   Mouth opening: Normal     Cardiovascular    Rhythm: regular  Rate: normal         Dental    Dentition: Poor dentition     Pulmonary  Breath sounds clear to auscultation               Abdominal  GI exam deferred       Other Findings            Anesthetic Plan    ASA: 4  Anesthesia type: total IV anesthesia and MAC          Induction: Intravenous  Anesthetic plan and risks discussed with: Patient and Son / Daughter      Telephone Consent obtained from patient's daughter.

## 2022-03-02 NOTE — PROGRESS NOTES
Family meeting on 3/1/22 w/attending, assigned RN (Raine), yancy, and writer to discuss medical condition; and plan of care regarding discharge. Initial discharge plan was home w/HH. Patient has been accepted w/[facility]. During family meeting concerns were voiced re: possibly SNF for LTC at time of discharge. SNF listing provided to patient's children. Family to discuss amongst all siblings and will make a decision. Will continue to monitor and follow re: disposition.            Nasrin Cho, MSW

## 2022-03-02 NOTE — PROGRESS NOTES
Infectious Disease Progress Note           Subjective:   Stable, scheduled for peg tube placement today, daughter at bedside, frustrated about pts aphasia, she wonders if her mother may have had another stroke, wants repeat brain imaging.    Objective:   Physical Exam:     Visit Vitals  BP (!) 140/52 (BP 1 Location: Left upper arm, BP Patient Position: At rest)   Pulse 79   Temp 97.8 °F (36.6 °C)   Resp 18   Ht 5' 7.01\" (1.702 m)   Wt 181 lb 3.5 oz (82.2 kg)   SpO2 97%   BMI 28.38 kg/m²    O2 Flow Rate (L/min): 1 l/min (placed on room air) O2 Device: None (Room air)    Temp (24hrs), Av.4 °F (37.4 °C), Min:97.8 °F (36.6 °C), Max:100.1 °F (37.8 °C)    701 -  190  In: 3000 [I.V.:3000]  Out: 4500 [Urine:4500]   1901 -  0700  In: 36101   Out: 93021 [YAS:25650]    General: NAD, alert, non-verbal, not following commands   HEENT: GUMARO, Moist mucosa   Lungs: CTA b/l, decreased at the bases, no wheeze/rhonchi   Heart: S1S2+, RRR, no murmur  Abdo: Soft, NT, ND, +BS   :+ indwelling giraldo cath    Exts: Ischemic changes involving left great toe   Skin: No pressure ulcer      Data Review:       Recent Days:  Recent Labs     22  03322  0534   WBC 15.2* 20.0*   HGB 7.3* 7.4*   HCT 22.9* 22.4*    313     Recent Labs     22  1019 22  0330 22  0534   BUN 34* 39* 54*  53*   CREA 1.49* 1.62* 1.78*  1.76*       Lab Results   Component Value Date/Time    C-Reactive protein 10.30 (H) 2022 03:30 AM          Microbiology     Results     Procedure Component Value Units Date/Time    CULTURE, Jordyn Sanders STAIN [[id number]] Collected: 22    Order Status: Completed Specimen: Wound Updated: 22 0858     Special Requests: No Special Requests        GRAM STAIN 2+ Gram Negative Rods         2+ Gram Positive Cocci        Culture result: Heavy Gram Negative Rods               checking for possible  Light  Mixed skin yasmine isolated CULTURE, BLOOD [[id number]] Collected: 02/25/22 1230    Order Status: Completed Specimen: Blood Updated: 03/02/22 0836     Special Requests: No Special Requests        Culture result: No growth 5 days       CULTURE, URINE [[id number]]  (Abnormal)  (Susceptibility) Collected: 02/23/22 1100    Order Status: Completed Specimen: Urine Updated: 02/26/22 1133     Special Requests: No Special Requests        [place] Count --        >100,000  colonies/ml       Culture result: Escherichia coli       Susceptibility      Escherichia coli     GEMA     Amikacin ($) Susceptible     Ampicillin ($) Resistant     Ampicillin/sulbactam ($) Resistant     Cefazolin ($) Susceptible     Cefepime ($$) Susceptible     Cefoxitin Susceptible     Ceftazidime ($) Susceptible     Ceftriaxone ($) Susceptible     Ciprofloxacin ($) Susceptible     Gentamicin ($) Resistant     Levofloxacin ($) Susceptible     Meropenem ($$) Susceptible     Nitrofurantoin Susceptible     Piperacillin/Tazobac ($) Susceptible     Tobramycin ($) Intermediate     Trimeth/Sulfa Resistant                  Linear View                            Diagnostics   CXR Results  (Last 48 hours)    None             Assessment/Plan     1. UTI complicated by hematuria, suspect underlying neurogenic bladder from recent CVA       E. coli isolated from urine culture on 2/23, blood Cx from 2/25 staying negative        Remains afebrile and hemodynamically stable off pressor support, routine labs not done        On Day # 3 of Zosyn, s/p 5 days of Ceftriaxone. F/u CBC ordered for AM      2. Positive hep C serology, daughter denies h/o IVDA or other risk factors for hep C       Hep C RNA VL is pending, Treatment as out pt if chronic hep C confirmed per work up       Explained to pt hep C not reason for decreases responsiveness      3.  Recently CVA, w right sided weakness, remains aphasic, daughter concerned, wants repeat head imaging       Notified Dr Giovanni Lacey about daughters concerns and request for repeat head imaging      4. Acute blood loss anemia: Hgb staying stable at 7     5. PAD s/p amp of right great toe, stable ischemic changes involving distal phalanx of left great toe      6.  Acute renal failure: Cr trending down on todays labs      Ulises Dillard MD    3/2/2022

## 2022-03-03 NOTE — PROGRESS NOTES
Infectious Disease Progress Note           Subjective:   Doing well, denies new complaints, no acute events since last seen. S/p peg tube placement on , on tube feeds, remains aphasic. Todays labs are pending, not requiring pressor support, mild rise in Cr on todays labs   Objective:   Physical Exam:     Visit Vitals  BP (!) 134/57 (BP 1 Location: Left upper arm, BP Patient Position: At rest)   Pulse 69   Temp (!) 101.2 °F (38.4 °C)   Resp 19   Ht 5' 7.01\" (1.702 m)   Wt 181 lb 3.5 oz (82.2 kg)   SpO2 98%   BMI 28.38 kg/m²    O2 Flow Rate (L/min): 1 l/min (placed on room air) O2 Device: None (Room air)    Temp (24hrs), Av.5 °F (37.5 °C), Min:97.8 °F (36.6 °C), Max:101.2 °F (38.4 °C)    No intake/output data recorded.     1901 -  0700  In: 40380.2 [I.V.:4524.2]  Out: 05962 [Urine:58173]    General: NAD, alert, non-verbal, not following commands   HEENT: GUMARO, Moist mucosa   Lungs: CTA b/l, decreased at the bases, no wheeze/rhonchi   Heart: S1S2+, RRR, no murmur  Abdo: Soft, NT, ND, +BS   :+ indwelling giraldo cath    Exts: Ischemic changes involving left great toe   Skin: No pressure ulcer      Data Review:       Recent Days:  Recent Labs     22  0330   WBC 15.2*   HGB 7.3*   HCT 22.9*        Recent Labs     22  0400 22  1019 22  0330   BUN 32* 34* 39*   CREA 1.55* 1.49* 1.62*       Lab Results   Component Value Date/Time    C-Reactive protein 8.61 (H) 2022 04:00 AM        Microbiology     Results     Procedure Component Value Units Date/Time    CULTURE, Brown An STAIN [[id number]] Collected: 22    Order Status: Completed Specimen: Wound Updated: 22 0858     Special Requests: No Special Requests        GRAM STAIN 2+ Gram Negative Rods         2+ Gram Positive Cocci        Culture result: Heavy Gram Negative Rods               checking for possible  Light  Mixed skin yasmine isolated      CULTURE, BLOOD [[id number]] Collected: 02/25/22 1230    Order Status: Completed Specimen: Blood Updated: 03/03/22 0831     Special Requests: No Special Requests        Culture result: No growth 6 days       CULTURE, URINE [[id number]]  (Abnormal)  (Susceptibility) Collected: 02/23/22 1100    Order Status: Completed Specimen: Urine Updated: 02/26/22 1133     Special Requests: No Special Requests        Benavides Count --        >100,000  colonies/ml       Culture result: Escherichia coli       Susceptibility      Escherichia coli     GEMA     Amikacin ($) Susceptible     Ampicillin ($) Resistant     Ampicillin/sulbactam ($) Resistant     Cefazolin ($) Susceptible     Cefepime ($$) Susceptible     Cefoxitin Susceptible     Ceftazidime ($) Susceptible     Ceftriaxone ($) Susceptible     Ciprofloxacin ($) Susceptible     Gentamicin ($) Resistant     Levofloxacin ($) Susceptible     Meropenem ($$) Susceptible     Nitrofurantoin Susceptible     Piperacillin/Tazobac ($) Susceptible     Tobramycin ($) Intermediate     Trimeth/Sulfa Resistant                  Linear View                            Diagnostics   CXR Results  (Last 48 hours)    None             Assessment/Plan     1. UTI complicated by hematuria, suspect underlying neurogenic bladder from recent CVA       E. coli isolated from urine culture on 2/23, blood Cx from 2/25 staying negative        Todays labs are pending, clotted per RN, remains off pressor support, temp of 101.2F today        On Day # 4 of Zosyn, s/p 5 days of Ceftriaxone       F/u routine labs for today, reordered for AM. Repeat BCx if fevers persist      2. Positive hep C serology, daughter denies h/o IVDA or other risk factors for hep C       Hep C RNA VL is pending, Treatment as out pt if chronic hep C confirmed per work up      3. Recently CVA, w right sided weakness, remains aphasic      Repeat CT head ordered, will follow results      4. Acute blood loss anemia: Hgb staying stable at 7, todays labs are pending      5.  Ischemic left great toe/chronic ulcer, s/p amp of right great toe      6.  Acute renal failure: Mild rise in Cr on todays labs      Mohinder Arndt MD    3/3/2022

## 2022-03-03 NOTE — PROGRESS NOTES
PT eval order received and acknowledged. PT RA attempted at [id number] and aborted as pt with decreased alertness with limited command following (per family pt with increased alertness earlier in AM) thus PROM provided for derik LE. Will continue to follow pt and attempt PT RA at a later time when appropriate.

## 2022-03-03 NOTE — ANESTHESIA POSTPROCEDURE EVALUATION
Procedure(s):  PERCUTANEOUS ENDOSCOPIC GASTROSTOMY TUBE INSERTION  ESOPHAGOGASTRODUODENOSCOPY (EGD). total IV anesthesia, MAC    Anesthesia Post Evaluation      Multimodal analgesia: multimodal analgesia not used between 6 hours prior to anesthesia start to PACU discharge  Patient location during evaluation: bedside (Endoscopy suite)  Patient participation: complete - patient cannot participate  Level of consciousness: sleepy but conscious  Pain score: 0  Pain management: adequate  Airway patency: patent  Anesthetic complications: no  Cardiovascular status: acceptable  Respiratory status: acceptable and nasal cannula  Hydration status: acceptable  Comments: This patient remained on the stretcher. The patient was handed off to the endoscopy nursing team.  All questions regarding pre-, intra-, and postoperative care were answered.   Post anesthesia nausea and vomiting:  none  Final Post Anesthesia Temperature Assessment:  Normothermia (36.0-37.5 degrees C)      INITIAL Post-op Vital signs:   Vitals Value Taken Time   /55 03/03/22 0600   Temp 38.4 °C (101.2 °F) 03/03/22 0700   Pulse 74 03/03/22 0600   Resp 18 03/03/22 0600   SpO2 99 % 03/03/22 0600

## 2022-03-03 NOTE — PROGRESS NOTES
Progress Note    Patient: Gely Garcia MRN: [id number]  SSN: [id number]    YOB: 1945  Age: 78 y.o. Sex: female      Admit Date: 2/21/2022    LOS: 6 days     Subjective:     78years old patient with multiorgan failure slowly improving. Patient had PEG tube placed yesterday but more alert. Still has difficulty with speech. Speech therapy by the bedside    Objective:     Vitals:    03/03/22 0600 03/03/22 0700 03/03/22 0800 03/03/22 0900   BP: (!) 160/55 (!) 171/59 (!) 160/58 (!) 134/57   Pulse: 74 75 74 69   Resp: 18 19 19 19   Temp:  (!) 101.2 °F (38.4 °C)     SpO2: 99% 98% 98% 98%   Weight:       Height:            Intake and Output:  Current Shift: No intake/output data recorded. Last three shifts: 03/01 1901 - 03/03 0700  In: 22746.2 [I.V.:4524.2]  Out: 10617 [Urine:63487]    Physical Exam:   General:  Alert, cooperative has dysphagia   Eyes:  Conjunctivae/corneas clear. PERRL, EOMs intact. Fundi benign   Ears:  Normal TMs and external ear canals both ears. Nose: Nares normal. Septum midline. Mucosa normal. No drainage or sinus tenderness. Mouth/Throat: Lips, mucosa, and tongue normal. Teeth and gums normal.   Neck: Supple, symmetrical, trachea midline, no adenopathy, thyroid: no enlargment/tenderness/nodules, no carotid bruit and no JVD. Back:   Symmetric, no curvature. ROM normal. No CVA tenderness. Lungs:   Clear to auscultation bilaterally. Heart:  Regular rate and rhythm, S1, S2 normal, no murmur, click, rub or gallop. Abdomen:   Soft, non-tender. Bowel sounds normal. No masses,  No organomegaly. Extremities: Extremities weakness, no cyanosis or edema. Pulses: 2+ and symmetric all extremities. Skin: Skin color, texture, turgor normal. No rashes or lesions   Lymph nodes: Diffuse weakness   Neurologic:        Lab/Data Review: All lab results for the last 24 hours reviewed.      Recent Results (from the past 24 hour(s))   METABOLIC PANEL, BASIC    Collection Time: 03/02/22 10:19 AM   Result Value Ref Range    Sodium 147 (H) 136 - 145 mmol/L    Potassium 4.5 3.5 - 5.1 mmol/L    Chloride 115 (H) 97 - 108 mmol/L    CO2 28 21 - 32 mmol/L    Anion gap 4 (L) 5 - 15 mmol/L    Glucose 213 (H) 65 - 100 mg/dL    BUN 34 (H) 6 - 20 mg/dL    Creatinine 1.49 (H) 0.55 - 1.02 mg/dL    BUN/Creatinine ratio 23 (H) 12 - 20      GFR est AA 41 (L) >60 ml/min/1.73m2    GFR est non-AA 34 (L) >60 ml/min/1.73m2    Calcium 8.5 8.5 - 10.1 mg/dL   PROTHROMBIN TIME + INR    Collection Time: 03/02/22 10:19 AM   Result Value Ref Range    Prothrombin time 16.9 (H) 11.9 - 14.6 sec    INR 1.4 (H) 0.9 - 1.1     HEPATIC FUNCTION PANEL    Collection Time: 03/02/22 10:19 AM   Result Value Ref Range    Protein, total 6.4 6.4 - 8.2 g/dL    Albumin 1.8 (L) 3.5 - 5.0 g/dL    Globulin 4.6 (H) 2.0 - 4.0 g/dL    A-G Ratio 0.4 (L) 1.1 - 2.2      Bilirubin, total 0.6 0.2 - 1.0 mg/dL    Bilirubin, direct 0.2 0.0 - 0.2 mg/dL    Alk.  phosphatase 117 45 - 117 U/L    AST (SGOT) 62 (H) 15 - 37 U/L    ALT (SGPT) 569 (H) 12 - 78 U/L   GLUCOSE, POC    Collection Time: 03/02/22 10:50 AM   Result Value Ref Range    Glucose (POC) 211 (H) 65 - 117 mg/dL    Performed by Siva Burris    GLUCOSE, POC    Collection Time: 03/02/22  4:41 PM   Result Value Ref Range    Glucose (POC) 229 (H) 65 - 117 mg/dL    Performed by Deacon Baez 124, POC    Collection Time: 03/02/22  7:45 PM   Result Value Ref Range    Glucose (POC) 159 (H) 65 - 117 mg/dL    Performed by Mónica Damon    GLUCOSE, POC    Collection Time: 03/02/22 10:20 PM   Result Value Ref Range    Glucose (POC) 112 65 - 117 mg/dL    Performed by Mónica Damon    GLUCOSE, POC    Collection Time: 03/03/22  3:18 AM   Result Value Ref Range    Glucose (POC) 165 (H) 65 - 117 mg/dL    Performed by Jennifer Andrews RN (Marily)    METABOLIC PANEL, BASIC    Collection Time: 03/03/22  4:00 AM   Result Value Ref Range    Sodium 148 (H) 136 - 145 mmol/L    Potassium 5.0 3.5 - 5.1 mmol/L    Chloride 117 (H) 97 - 108 mmol/L    CO2 26 21 - 32 mmol/L    Anion gap 5 5 - 15 mmol/L    Glucose 169 (H) 65 - 100 mg/dL    BUN 32 (H) 6 - 20 mg/dL    Creatinine 1.55 (H) 0.55 - 1.02 mg/dL    BUN/Creatinine ratio 21 (H) 12 - 20      GFR est AA 40 (L) >60 ml/min/1.73m2    GFR est non-AA 33 (L) >60 ml/min/1.73m2    Calcium 8.4 (L) 8.5 - 10.1 mg/dL   PROTHROMBIN TIME + INR    Collection Time: 03/03/22  4:00 AM   Result Value Ref Range    Prothrombin time 15.9 (H) 11.9 - 14.6 sec    INR 1.3 (H) 0.9 - 1.1     HEPATIC FUNCTION PANEL    Collection Time: 03/03/22  4:00 AM   Result Value Ref Range    Protein, total 6.6 6.4 - 8.2 g/dL    Albumin 1.7 (L) 3.5 - 5.0 g/dL    Globulin 4.9 (H) 2.0 - 4.0 g/dL    A-G Ratio 0.3 (L) 1.1 - 2.2      Bilirubin, total 0.5 0.2 - 1.0 mg/dL    Bilirubin, direct 0.3 (H) 0.0 - 0.2 mg/dL    Alk. phosphatase 112 45 - 117 U/L    AST (SGOT) 57 (H) 15 - 37 U/L    ALT (SGPT) 431 (H) 12 - 78 U/L   C REACTIVE PROTEIN, QT    Collection Time: 03/03/22  4:00 AM   Result Value Ref Range    C-Reactive protein 8.61 (H) 0.00 - 0.60 mg/dL   PROCALCITONIN    Collection Time: 03/03/22  4:00 AM   Result Value Ref Range    Procalcitonin 3.29 (H) 0 ng/mL   GLUCOSE, POC    Collection Time: 03/03/22  7:06 AM   Result Value Ref Range    Glucose (POC) 205 (H) 65 - 117 mg/dL    Performed by Georgann Cockayne          Assessment:     Active Problems:    CVA (cerebral vascular accident) ([address] (2/21/2022)      Elevated troponin (2/21/2022)    Multiorgan failure. Repeated cva  Sepsis  Shock liver    Non-STEMI  Acute renal failure stage IIIb  Diabetes  Possible diabetic nephropathy  Metabolic encephalopathy    Plan:     Obtain CBC BMP, liver enzymes trending downwards  Prolonged care time of 45 minutes. I have extensive discussion with family on the progress and  prognosis patient. I discussed with infectious disease MD has had extensive discussion with family.   Neurology following  Transfer patient to medical telemetry Case management consult    Signed By: Lisa Costa MD     March 3, 2022

## 2022-03-03 NOTE — PROGRESS NOTES
Patient transferred to [address] [place] [address] room [address] by myself and Bruce Glover. Patient is alert, stable, aphasic at this time. Patients daughter with her at the time of transfer and stated all of her personal belongings are out of the room. Telemetry box in place, giraldo catheter with CBI running, PIV X2. Primary RN took report over the phone and did not voice any questions nor concerns at this time. We pulled patient over to floor bed, repositioned, and ensured patient was comfortable before leaving. Bed alarm on, call bell within reach.  Patients chart handed to RN upon arrival.

## 2022-03-03 NOTE — PROGRESS NOTES
Pt arrived on floor as transfer. SBAR received from . Meryl Rico PT A/Ox1, able to follow commands intermittently. 2 IV access in place in R arm, Bladder irrigation running continuous. Skin to skin assessment completed with [facility] RN   Missing R great toe, Blacked L toe,      Pt has belongings at bedside as follows: none observed.     Continous bladder irrigation running with red output in collection bag    Mother at bedside

## 2022-03-03 NOTE — PROGRESS NOTES
Renal Daily Progress Note    Admit Date: 2/21/2022      Subjective:   She appears to be more responsive but nonverbal. She is not moving her right upper extremity.     Current Facility-Administered Medications   Medication Dose Route Frequency    acetaminophen (TYLENOL) solution 650 mg  650 mg Per G Tube Q4H PRN    0.45% sodium chloride infusion  50 mL/hr IntraVENous CONTINUOUS    insulin glargine (LANTUS) injection 15 Units  15 Units SubCUTAneous QHS    piperacillin-tazobactam (ZOSYN) 3.375 g in 0.9% sodium chloride (MBP/ADV) 100 mL MBP  3.375 g IntraVENous Q8H    0.9% sodium chloride infusion 250 mL  250 mL IntraVENous PRN    insulin lispro (HUMALOG) injection   SubCUTAneous Q4H    latanoprost (XALATAN) 0.005 % ophthalmic solution 1 Drop  1 Drop Right Eye QPM    gabapentin (NEURONTIN) capsule 100 mg  100 mg Oral DAILY    0.9% sodium chloride infusion 250 mL  250 mL IntraVENous PRN    zinc oxide-white petrolatum 20-75 % topical paste   Topical BID    lidocaine (XYLOCAINE) 4 % (40 mg/mL) topical solution   Topical PRN    [Held by provider] rivaroxaban (XARELTO) tablet 2.5 mg  2.5 mg Oral BID WITH MEALS    [Held by provider] atorvastatin (LIPITOR) tablet 80 mg  80 mg Oral DAILY    labetaloL (NORMODYNE;TRANDATE) 20 mg/4 mL (5 mg/mL) injection 10 mg  10 mg IntraVENous Q6H PRN    sodium chloride (NS) flush 5-40 mL  5-40 mL IntraVENous Q8H    sodium chloride (NS) flush 5-40 mL  5-40 mL IntraVENous PRN    brimonidine (ALPHAGAN) 0.2 % ophthalmic solution 1 Drop  1 Drop Both Eyes TID    ferrous sulfate tablet 325 mg  325 mg Oral BID    glucose chewable tablet 16 g  4 Tablet Oral PRN    glucagon (GLUCAGEN) injection 1 mg  1 mg IntraMUSCular PRN    dextrose 10% infusion 125-250 mL  125-250 mL IntraVENous PRN    metoprolol succinate (TOPROL-XL) XL tablet 50 mg  50 mg Oral DAILY    amLODIPine (NORVASC) tablet 10 mg  10 mg Oral DAILY        Review of Systems    ROS   Not obtainable    Objective:     Patient Vitals for the past 8 hrs:   BP Temp Pulse Resp SpO2   03/03/22 1100 139/70 100 °F (37.8 °C) 70 19 98 %   03/03/22 0900 (!) 134/57 -- 69 19 98 %   03/03/22 0800 (!) 160/58 -- 74 19 98 %   03/03/22 0700 (!) 171/59 (!) 101.2 °F (38.4 °C) 75 19 98 %   03/03/22 0600 (!) 160/55 -- 74 18 99 %   03/03/22 0500 (!) 157/56 -- 74 17 99 %     03/03 0701 - 03/03 1900  In: 290   Out: -   03/01 1901 - 03/03 0700  In: 12773.6 [I.V.:4524.2]  Out: 43801 [Urine:18333]    Physical Exam:   Physical Exam  Cardiovascular:      Rate and Rhythm: Regular rhythm. Pulmonary:      Breath sounds: Normal breath sounds. Abdominal:      General: Bowel sounds are normal.      Palpations: Abdomen is soft. Comments: PEG tube in place     Trace edema  She is nonverbal  She continues on CBI. Urine clearing  Not withdrawing right upper extremity in response to noxious stimuli    XR FOOT LT AP/LAT   Final Result      DUPLEX UPPER EXT VENOUS RIGHT   Final Result      US ABD COMP   Final Result   Gallbladder sludge without other acute abnormality. XR CHEST PORT   Final Result   The cardiomediastinal silhouette is appropriate for age, technique,   and lung expansion. Pulmonary vasculature is not congested. The lungs are   essentially clear. No effusion or pneumothorax is seen. CT HEAD WO CONT   Final Result   Negative for acute intracranial process. CT ABD PELV WO CONT   Final Result   Hemorrhage within the urinary bladder. Mild left   hydroureteronephrosis. XR SWALLOW FUNC VIDEO   Final Result   Penetration with thin consistency. No aspiration. CTA HEAD NECK W WO CONT   Final Result   Negative head and neck CTA. Please note that all carotid bifurcation stenoses are measured as per NASCET   criteria. CT CODE NEURO HEAD WO CONTRAST   Final Result   1. No acute large vessel intracranial ischemia, hemorrhage.  Called report to   patient's nurse Arabella Hernandez at 7:12 PM 2/22/2022.   2. Right basal ganglia lacunar infarct. 3. Periventricular microangiopathy. 4. Pansinusitis. See above. MRI BRAIN WO CONT   Final Result   Acute infarction left paracentral lobule and region of the white   matter of the left motor cortex. CT CODE NEURO HEAD WO CONTRAST   Final Result      1. No acute intracranial hemorrhage. 2. Left basal ganglia lacunar infarct, which is age indeterminate in the absence   of any prior outside studies, but favored to be chronic. 3. Moderate to severe paranasal sinus disease. Results called to Dr. Maynor José at 9:39 PM on 2/21/2022      CT HEAD WO CONT    (Results Pending)        Data Review   Recent Labs     03/01/22  0330   WBC 15.2*   HGB 7.3*   HCT 22.9*        Recent Labs     03/03/22  0400 03/02/22  1019 03/02/22  0400 03/01/22  1515 03/01/22  0330 03/01/22  0330   * 147*  --   --   --  147*   K 5.0 4.5  --   --   --  4.4   * 115*  --   --   --  113*   CO2 26 28  --   --   --  29   * 213*  --   --   --  231*   BUN 32* 34*  --   --   --  39*   CREA 1.55* 1.49*  --   --   --  1.62*   CA 8.4* 8.5  --   --   --  8.4*   PHOS  --   --  2.7  --   --  2.4*   ALB 1.7* 1.8*  --  1.6*   < > 1.6*   * 569*  --  788*  --   --    INR 1.3* 1.4*  --  1.5*  --   --     < > = values in this interval not displayed. No components found for: GLPOC  No results for input(s): PH, PCO2, PO2, HCO3, FIO2 in the last 72 hours. Recent Labs     03/03/22  0400 03/02/22  1019 03/01/22  1515   INR 1.3* 1.4* 1.5*         Assessment:           Active Problems:    CVA (cerebral vascular accident) ([address] (2/21/2022)      Elevated troponin (2/21/2022)    Acute kidney injury improving. Creatinine is down to 1.55 mg. Modest hypernatremia-Free water deficit 2.8 L.     Elevated transaminases- improving -  GI following    Normocytic anemia    Acute CVA    Diabetes mellitus with peripheral neuropathy    Hematuria on CBI as per urology    E. coli urinary tract infection    Modest hypophosphatemia - corrected. .    Hypertension. She has been started on amlodipine and metoprolol. Blood pressures under acceptable control for now. Plan:   Unable to quantiate UOP due to CBI. Continue free water via the PEG. Decrease half-normal saline IV.

## 2022-03-03 NOTE — PROGRESS NOTES
OT eval order received and acknowledged. OT RA attempted at [id number] and aborted as pt lethargic with limited command following (per family pt with increased alertness earlier in AM, worked with SLP and family showing video of pt writing name) thus PROM provided for derik UE. Will continue to follow pt and attempt OT RA at a later time when appropriate.

## 2022-03-03 NOTE — PROGRESS NOTES
Neurology Progress Note    Patient ID:  Sherrell Oshea  [id number]  58 y.o.  1945    Subjective: The patient is seen in follow-up this morning and she is more responsive, said good morning in response and good in response to the question how she was doing and attempted to squeeze my finger with the right hand. She raised the left arm up on command. No sedation. Patient is a 78year old woman with HTN, DM who had a stroke last week with right facial droop and right sided weakness worked up in [facility] who was brought in for worsening speech and left sided weakness per daughters at bedside. She is living at home and uses a walker to ambulate at baseline. Ct head done yesterday shows old basal ganglia stroke. Her BP are elevated to 743X systolic.     Current Facility-Administered Medications   Medication Dose Route Frequency    acetaminophen (TYLENOL) solution 650 mg  650 mg Per G Tube Q4H PRN    0.45% sodium chloride infusion  50 mL/hr IntraVENous CONTINUOUS    insulin glargine (LANTUS) injection 15 Units  15 Units SubCUTAneous QHS    piperacillin-tazobactam (ZOSYN) 3.375 g in 0.9% sodium chloride (MBP/ADV) 100 mL MBP  3.375 g IntraVENous Q8H    0.9% sodium chloride infusion 250 mL  250 mL IntraVENous PRN    insulin lispro (HUMALOG) injection   SubCUTAneous Q4H    latanoprost (XALATAN) 0.005 % ophthalmic solution 1 Drop  1 Drop Right Eye QPM    gabapentin (NEURONTIN) capsule 100 mg  100 mg Oral DAILY    0.9% sodium chloride infusion 250 mL  250 mL IntraVENous PRN    zinc oxide-white petrolatum 20-75 % topical paste   Topical BID    lidocaine (XYLOCAINE) 4 % (40 mg/mL) topical solution   Topical PRN    [Held by provider] rivaroxaban (XARELTO) tablet 2.5 mg  2.5 mg Oral BID WITH MEALS    [Held by provider] atorvastatin (LIPITOR) tablet 80 mg  80 mg Oral DAILY    labetaloL (NORMODYNE;TRANDATE) 20 mg/4 mL (5 mg/mL) injection 10 mg  10 mg IntraVENous Q6H PRN    sodium chloride (NS) flush 5-40 mL  5-40 mL IntraVENous Q8H    sodium chloride (NS) flush 5-40 mL  5-40 mL IntraVENous PRN    brimonidine (ALPHAGAN) 0.2 % ophthalmic solution 1 Drop  1 Drop Both Eyes TID    ferrous sulfate tablet 325 mg  325 mg Oral BID    glucose chewable tablet 16 g  4 Tablet Oral PRN    glucagon (GLUCAGEN) injection 1 mg  1 mg IntraMUSCular PRN    dextrose 10% infusion 125-250 mL  125-250 mL IntraVENous PRN    metoprolol succinate (TOPROL-XL) XL tablet 50 mg  50 mg Oral DAILY    amLODIPine (NORVASC) tablet 10 mg  10 mg Oral DAILY     Objective:     Patient Vitals for the past 8 hrs:   BP Temp Pulse Resp SpO2   03/03/22 0700 -- (!) 101.2 °F (38.4 °C) -- -- --   03/03/22 0600 (!) 160/55 -- 74 18 99 %   03/03/22 0500 (!) 157/56 -- 74 17 99 %   03/03/22 0400 (!) 158/56 -- 73 16 98 %   03/03/22 0300 (!) 168/58 99.1 °F (37.3 °C) 73 17 97 %   03/03/22 0200 (!) 163/58 -- 70 18 97 %   03/03/22 0100 (!) 162/60 -- 70 15 98 %     No intake/output data recorded.   03/01 1901 - 03/03 0700  In: 20472.2 [I.V.:4524.2]  Out: 90605 [Urine:33110]    Lab Review   Recent Results (from the past 24 hour(s))   METABOLIC PANEL, BASIC    Collection Time: 03/02/22 10:19 AM   Result Value Ref Range    Sodium 147 (H) 136 - 145 mmol/L    Potassium 4.5 3.5 - 5.1 mmol/L    Chloride 115 (H) 97 - 108 mmol/L    CO2 28 21 - 32 mmol/L    Anion gap 4 (L) 5 - 15 mmol/L    Glucose 213 (H) 65 - 100 mg/dL    BUN 34 (H) 6 - 20 mg/dL    Creatinine 1.49 (H) 0.55 - 1.02 mg/dL    BUN/Creatinine ratio 23 (H) 12 - 20      GFR est AA 41 (L) >60 ml/min/1.73m2    GFR est non-AA 34 (L) >60 ml/min/1.73m2    Calcium 8.5 8.5 - 10.1 mg/dL   PROTHROMBIN TIME + INR    Collection Time: 03/02/22 10:19 AM   Result Value Ref Range    Prothrombin time 16.9 (H) 11.9 - 14.6 sec    INR 1.4 (H) 0.9 - 1.1     HEPATIC FUNCTION PANEL    Collection Time: 03/02/22 10:19 AM   Result Value Ref Range    Protein, total 6.4 6.4 - 8.2 g/dL    Albumin 1.8 (L) 3.5 - 5.0 g/dL    Globulin 4.6 (H) 2.0 - 4.0 g/dL A-G Ratio 0.4 (L) 1.1 - 2.2      Bilirubin, total 0.6 0.2 - 1.0 mg/dL    Bilirubin, direct 0.2 0.0 - 0.2 mg/dL    Alk.  phosphatase 117 45 - 117 U/L    AST (SGOT) 62 (H) 15 - 37 U/L    ALT (SGPT) 569 (H) 12 - 78 U/L   GLUCOSE, POC    Collection Time: 03/02/22 10:50 AM   Result Value Ref Range    Glucose (POC) 211 (H) 65 - 117 mg/dL    Performed by [employer]    GLUCOSE, POC    Collection Time: 03/02/22  4:41 PM   Result Value Ref Range    Glucose (POC) 229 (H) 65 - 117 mg/dL    Performed by Deacon Baez 124, POC    Collection Time: 03/02/22  7:45 PM   Result Value Ref Range    Glucose (POC) 159 (H) 65 - 117 mg/dL    Performed by Nkechi Rothman    GLUCOSE, POC    Collection Time: 03/02/22 10:20 PM   Result Value Ref Range    Glucose (POC) 112 65 - 117 mg/dL    Performed by Nkechi Rothman    GLUCOSE, POC    Collection Time: 03/03/22  3:18 AM   Result Value Ref Range    Glucose (POC) 165 (H) 65 - 117 mg/dL    Performed by Jordan Napoles RN (Tvlr)    METABOLIC PANEL, BASIC    Collection Time: 03/03/22  4:00 AM   Result Value Ref Range    Sodium 148 (H) 136 - 145 mmol/L    Potassium 5.0 3.5 - 5.1 mmol/L    Chloride 117 (H) 97 - 108 mmol/L    CO2 26 21 - 32 mmol/L    Anion gap 5 5 - 15 mmol/L    Glucose 169 (H) 65 - 100 mg/dL    BUN 32 (H) 6 - 20 mg/dL    Creatinine 1.55 (H) 0.55 - 1.02 mg/dL    BUN/Creatinine ratio 21 (H) 12 - 20      GFR est AA 40 (L) >60 ml/min/1.73m2    GFR est non-AA 33 (L) >60 ml/min/1.73m2    Calcium 8.4 (L) 8.5 - 10.1 mg/dL   PROTHROMBIN TIME + INR    Collection Time: 03/03/22  4:00 AM   Result Value Ref Range    Prothrombin time 15.9 (H) 11.9 - 14.6 sec    INR 1.3 (H) 0.9 - 1.1     HEPATIC FUNCTION PANEL    Collection Time: 03/03/22  4:00 AM   Result Value Ref Range    Protein, total 6.6 6.4 - 8.2 g/dL    Albumin 1.7 (L) 3.5 - 5.0 g/dL    Globulin 4.9 (H) 2.0 - 4.0 g/dL    A-G Ratio 0.3 (L) 1.1 - 2.2      Bilirubin, total 0.5 0.2 - 1.0 mg/dL    Bilirubin, direct 0.3 (H) 0.0 - 0.2 mg/dL Alk. phosphatase 112 45 - 117 U/L    AST (SGOT) 57 (H) 15 - 37 U/L    ALT (SGPT) 431 (H) 12 - 78 U/L   C REACTIVE PROTEIN, QT    Collection Time: 03/03/22  4:00 AM   Result Value Ref Range    C-Reactive protein 8.61 (H) 0.00 - 0.60 mg/dL   PROCALCITONIN    Collection Time: 03/03/22  4:00 AM   Result Value Ref Range    Procalcitonin 3.29 (H) 0 ng/mL   GLUCOSE, POC    Collection Time: 03/03/22  7:06 AM   Result Value Ref Range    Glucose (POC) 205 (H) 65 - 117 mg/dL    Performed by Janelle Conley      Additional comments: MRI of the brain shows right paracentral lobule infarct    NEUROLOGICAL EXAM:  Appearance: The patient is well developed, much more awake. She responded by saying good morning and good in response to how she was doing. Mental Status:  She is more awake and alert. Cranial Nerves:   Intact visual fields. GUMARO, EOM's full, no nystagmus, no ptosis. Facial movement is asymmetric with moderate flattening of the right nasolabial fold. Motor:   Moves all extremities to command may be right side slightly weaker than the right. Reflexes:   Deep tendon reflexes not checked. Sensory:   Normal to touch, pinprick. Gait:   Cannot be checked. Tremor:   No tremor noted. Cerebellar:   Could not be checked. Neurovascular:  Normal heart sounds and regular rhythm     Assessment:   1. Acute infarction left paracentral lobule and region of the white matter of the left motor cortex: This is the cause of her new right-sided weakness. 2.  Lethargy/encephalopathy: She is better, still slow in her responses. She has diabetic neuropathy which causes neuropathic pain. She is supposed to be taking gabapentin which has been on hold due to sedation  3. Hypertension. 4.  Diabetes mellitus  Plan:   1.  Gabapentin is on hold for now. Once she starts becoming more awake we'll start at 100 mg 3 times daily.   2.  Please give Ativan 1 mg only if she goes into a grand mal seizure lasting for more than 3 minutes. 3.  Spoke to the daughter Melly at length updating her about the condition of the patient and the fact that she did responded better today and followed more commands.   4.  Ordering a CT scan of the head without contrast.    Thank you,    Signed:  Crissy Young MD  3/3/2022  11:46 AM

## 2022-03-03 NOTE — PROGRESS NOTES
Progress Note    Patient: Chintan Mejia MRN: [id number]  SSN: [id number]    YOB: 1945  Age: 78 y.o.   Sex: female      Admit Date: 2/21/2022    LOS: 6 days     Subjective:   Pt seen and examined  Breathing better,  No further seizure activity   on tube feeding     Past Medical History:   Diagnosis Date    Diabetes mellitus ([facility] [address]     HTN (hypertension)     Hyperlipidemia     Neuropathy     PAD (peripheral artery disease) (HCC)     PCI    Sciatica         Current Facility-Administered Medications:     acetaminophen (TYLENOL) solution 650 mg, 650 mg, Per G Tube, Q4H PRN, Edwar JACKSON MD, 650 mg at 03/03/22 1209    0.45% sodium chloride infusion, 25 mL/hr, IntraVENous, CONTINUOUS, Kalpesh Lara MD, Last Rate: 25 mL/hr at 03/03/22 1209, 25 mL/hr at 03/03/22 1209    insulin glargine (LANTUS) injection 15 Units, 15 Units, SubCUTAneous, QHS, Orestes Norman MD, 15 Units at 03/02/22 2200    piperacillin-tazobactam (ZOSYN) 3.375 g in 0.9% sodium chloride (MBP/ADV) 100 mL MBP, 3.375 g, IntraVENous, Q8H, Fred Barnes MD, Last Rate: 25 mL/hr at 03/03/22 1209, 3.375 g at 03/03/22 1209    0.9% sodium chloride infusion 250 mL, 250 mL, IntraVENous, PRN, Israel Soto MD    insulin lispro (HUMALOG) injection, , SubCUTAneous, Q4H, Israel Soto MD, 6 Units at 03/03/22 1209    latanoprost (XALATAN) 0.005 % ophthalmic solution 1 Drop, 1 Drop, Right Eye, QPM, Israel Soto MD, 1 Drop at 03/02/22 1800    gabapentin (NEURONTIN) capsule 100 mg, 100 mg, Oral, DAILY, Orestes Norman MD, 100 mg at 03/03/22 4060    0.9% sodium chloride infusion 250 mL, 250 mL, IntraVENous, PRN, Chely Louise MD    zinc oxide-white petrolatum 20-75 % topical paste, , Topical, BID, Orestes Norman MD, Given at 03/03/22 0900    lidocaine (XYLOCAINE) 4 % (40 mg/mL) topical solution, , Topical, PRN, Waqas Delgadillo MD, Given at 02/26/22 2207    [Held by provider] rivaroxaban Leidy Bullock) tablet 2.5 mg, 2.5 mg, Oral, BID WITH MEALS, Heide Rodriguez MD, 2.5 mg at 02/24/22 1720    [Held by provider] atorvastatin (LIPITOR) tablet 80 mg, 80 mg, Oral, DAILY, Heide Rodriguez MD, 80 mg at 02/26/22 0900    labetaloL (NORMODYNE;TRANDATE) 20 mg/4 mL (5 mg/mL) injection 10 mg, 10 mg, IntraVENous, Q6H PRN, Heide Rodriguez MD, 10 mg at 03/02/22 1224    sodium chloride (NS) flush 5-40 mL, 5-40 mL, IntraVENous, PRN, Orestes Truong MD    brimonidine (ALPHAGAN) 0.2 % ophthalmic solution 1 Drop, 1 Drop, Both Eyes, TID, Kiarra Glover MD, 1 Drop at 03/03/22 5122    ferrous sulfate tablet 325 mg, 325 mg, Oral, BID, Orestes Norman MD, 325 mg at 03/03/22 1928    glucose chewable tablet 16 g, 4 Tablet, Oral, PRN, Carlos JACKSON MD    glucagon (GLUCAGEN) injection 1 mg, 1 mg, IntraMUSCular, PRN, Carlos JACKSON MD    dextrose 10% infusion 125-250 mL, 125-250 mL, IntraVENous, PRN, Carlos JACKSON MD    metoprolol succinate (TOPROL-XL) XL tablet 50 mg, 50 mg, Oral, DAILY, Mamadou Stewart MD, 50 mg at 03/03/22 0062    amLODIPine (NORVASC) tablet 10 mg, 10 mg, Oral, DAILY, Mamadou Stewart MD, 10 mg at 03/03/22 0821    Objective:     Vitals:    03/03/22 0800 03/03/22 0900 03/03/22 1100 03/03/22 1200   BP: (!) 160/58 (!) 134/57 139/70    Pulse: 74 69 70 68   Resp: 19 19 19    Temp:   100 °F (37.8 °C)    SpO2: 98% 98% 98%    Weight:       Height:            Intake and Output:  Current Shift: 03/03 0701 - 03/03 1900  In: 290   Out: -   Last three shifts: 03/01 1901 - 03/03 0700  In: 87453.2 [I.V.:4524.2]  Out: 66984 [Urine:43480]    Physical Exam:   Physical Exam  Constitutional:       Appearance: She is ill-appearing. HENT:      Head: Atraumatic. Mouth/Throat:      Mouth: Mucous membranes are dry. Eyes:      General: No scleral icterus. Cardiovascular:      Rate and Rhythm: Regular rhythm. Pulses: Normal pulses.    Pulmonary:      Effort: Pulmonary effort is normal.   Abdominal: Tenderness: There is no abdominal tenderness. Musculoskeletal:         General: Normal range of motion. Cervical back: Normal range of motion. Skin:     General: Skin is warm. Neurological:      General: No focal deficit present. Motor: No weakness. Gait: Gait normal.          Lab/Data Review:  Recent Results (from the past 24 hour(s))   GLUCOSE, POC    Collection Time: 03/02/22  4:41 PM   Result Value Ref Range    Glucose (POC) 229 (H) 65 - 117 mg/dL    Performed by Deacon Zavala, POC    Collection Time: 03/02/22  7:45 PM   Result Value Ref Range    Glucose (POC) 159 (H) 65 - 117 mg/dL    Performed by Amelia Copas    GLUCOSE, POC    Collection Time: 03/02/22 10:20 PM   Result Value Ref Range    Glucose (POC) 112 65 - 117 mg/dL    Performed by Amelia Copas    GLUCOSE, POC    Collection Time: 03/03/22  3:18 AM   Result Value Ref Range    Glucose (POC) 165 (H) 65 - 117 mg/dL    Performed by Drake Torres RN (Tvlr)    METABOLIC PANEL, BASIC    Collection Time: 03/03/22  4:00 AM   Result Value Ref Range    Sodium 148 (H) 136 - 145 mmol/L    Potassium 5.0 3.5 - 5.1 mmol/L    Chloride 117 (H) 97 - 108 mmol/L    CO2 26 21 - 32 mmol/L    Anion gap 5 5 - 15 mmol/L    Glucose 169 (H) 65 - 100 mg/dL    BUN 32 (H) 6 - 20 mg/dL    Creatinine 1.55 (H) 0.55 - 1.02 mg/dL    BUN/Creatinine ratio 21 (H) 12 - 20      GFR est AA 40 (L) >60 ml/min/1.73m2    GFR est non-AA 33 (L) >60 ml/min/1.73m2    Calcium 8.4 (L) 8.5 - 10.1 mg/dL   PROTHROMBIN TIME + INR    Collection Time: 03/03/22  4:00 AM   Result Value Ref Range    Prothrombin time 15.9 (H) 11.9 - 14.6 sec    INR 1.3 (H) 0.9 - 1.1     HEPATIC FUNCTION PANEL    Collection Time: 03/03/22  4:00 AM   Result Value Ref Range    Protein, total 6.6 6.4 - 8.2 g/dL    Albumin 1.7 (L) 3.5 - 5.0 g/dL    Globulin 4.9 (H) 2.0 - 4.0 g/dL    A-G Ratio 0.3 (L) 1.1 - 2.2      Bilirubin, total 0.5 0.2 - 1.0 mg/dL    Bilirubin, direct 0.3 (H) 0.0 - 0.2 mg/dL    Alk. phosphatase 112 45 - 117 U/L    AST (SGOT) 57 (H) 15 - 37 U/L    ALT (SGPT) 431 (H) 12 - 78 U/L   C REACTIVE PROTEIN, QT    Collection Time: 03/03/22  4:00 AM   Result Value Ref Range    C-Reactive protein 8.61 (H) 0.00 - 0.60 mg/dL   PROCALCITONIN    Collection Time: 03/03/22  4:00 AM   Result Value Ref Range    Procalcitonin 3.29 (H) 0 ng/mL   GLUCOSE, POC    Collection Time: 03/03/22  7:06 AM   Result Value Ref Range    Glucose (POC) 205 (H) 65 - 117 mg/dL    Performed by [address] [place] [address], POC    Collection Time: 03/03/22 11:00 AM   Result Value Ref Range    Glucose (POC) 248 (H) 65 - 117 mg/dL    Performed by Jackson Smith         XR FOOT LT AP/LAT   Final Result      DUPLEX UPPER EXT VENOUS RIGHT   Final Result      US ABD COMP   Final Result   Gallbladder sludge without other acute abnormality. XR CHEST PORT   Final Result   The cardiomediastinal silhouette is appropriate for age, technique,   and lung expansion. Pulmonary vasculature is not congested. The lungs are   essentially clear. No effusion or pneumothorax is seen. CT HEAD WO CONT   Final Result   Negative for acute intracranial process. CT ABD PELV WO CONT   Final Result   Hemorrhage within the urinary bladder. Mild left   hydroureteronephrosis. XR SWALLOW FUNC VIDEO   Final Result   Penetration with thin consistency. No aspiration. CTA HEAD NECK W WO CONT   Final Result   Negative head and neck CTA. Please note that all carotid bifurcation stenoses are measured as per NASCET   criteria. CT CODE NEURO HEAD WO CONTRAST   Final Result   1. No acute large vessel intracranial ischemia, hemorrhage. Called report to   patient's nurse Harika Wilson at 7:12 PM 2/22/2022.   2. Right basal ganglia lacunar infarct. 3. Periventricular microangiopathy. 4. Pansinusitis. See above.       MRI BRAIN WO CONT   Final Result   Acute infarction left paracentral lobule and region of the white   matter of the left motor cortex. CT CODE NEURO HEAD WO CONTRAST   Final Result      1. No acute intracranial hemorrhage. 2. Left basal ganglia lacunar infarct, which is age indeterminate in the absence   of any prior outside studies, but favored to be chronic. 3. Moderate to severe paranasal sinus disease.       Results called to Dr. Yousuf Darby at 9:39 PM on 2/21/2022      CT HEAD WO CONT    (Results Pending)        Assessment:     Active Problems:    CVA (cerebral vascular accident) ([address] (2/21/2022)      Elevated troponin (2/21/2022)         Elevated of transaminase, no documented history of hepatic disease, hepatitis,  had a seizure yesterday, likely patient had rhabdomyolysis secondary to seizure disorders    transaminase down  History of CVA, seisure  Urosepsis, on multiple antibiotic  Hb drop some     IV hydrations, follow renal function closely  Continue PEG tube feeding  Nutrition to follow  Sign off         Signed By: Olga Sepulveda MD     March 3, 2022        Thank you for allowing me to participate in this patients care  Cc Referring Physician   Nicole Agudelo MD

## 2022-03-03 NOTE — PROGRESS NOTES
Met w/patient's daughter Syed Parra) at bedside. Inquired if siblings have made a decision re: where they want referral sent. Family meeting to discuss discharge disposition. Family is considering [facility] or [facility] for IRF. Daughter to get back w/writer to inform of choice. Patient to be evaluated by PT/OT for d/c recommendation. Insurance will require auth. No choice given and no referral sent.         Mando Orellana, DOV

## 2022-03-03 NOTE — PROGRESS NOTES
SPEECH LANGUAGE PATHOLOGY EVALUATION  Patient: Nazario Arteaga  (45 y.o. )  Date: 3/3/2022  Primary Diagnosis: CVA, Elevated troponin   Precautions:  Aspiration and fall    ASSESSMENT :  Based on the objective data described below, the patient presents with severe dysarthria and expressive aphasia. Concerns for oral apraxia present. Patient presents w/ mild to moderate receptive aphasia. Patient able to approximate limited single words such as first name and social greetings w/ noted groping and effort. She is not able to repeat phonemes or single syllables. Patient w/ flaccid R facial droop, slight left lingual deviation and reduced lingual ROM. Attempted melodic patterns this did not improve verbalizations. Receptive deficits w/ multi step commands and complex yes/no questions. Patient able to utilize communication board w/ field of 4 or smaller w/ increased processing time and repetition of questions. Patient able to identify objects out of field of 2 w/ object and function ID. Patient is left handed attempted written communication of first name, birth month, and numbers. Patient w/ transposition of letters. She is able to trace and identify letters and words during written communication. Cognitive deficits present w/ processing, planning, execution and attention. She benefits from repetition, redirection to task, reduced distractions and multi modalities of communication (verbal and visual). Granddaughter present and encouraged home work of simple 2 object identification, use of communication board field of 2, and basic yes/no questions. Family is receptive of education. Patient motivated and engaged during treatment. Patient will benefit from skilled intervention to address the above impairments. Patients rehabilitation potential is considered to be Guarded     PLAN :  Recommendations and Planned Interventions:  Rec cont NPO w/ TF via PEG. Rec cont speech language tx.   Rec use of communication board for basic wants/needs. Frequency/Duration: Patient will be followed by speech-language pathology 5 times a week to address goals.   Discharge Recommendations: IRF vs SNF pending progress     SUBJECTIVE:     OBJECTIVE:     Informal speech-language evaluation with various subtest administered    Receptive Language:  Receptive vocabulary    [] WNL    [] Impaired [] Mild [] Mod [] Severe  Reliability of yes/no responses to questions [] WNL    [] Impaired [] Mild [] Mod [] Severe  Simple yes/no questions   [] WNL    [x] Impaired [x] Mild [] Mod [] Severe   Complex yes/no questions     [] WNL    [x] Impaired [] Mild [x] Mod [x] Severe   Reliability of responses to complex ideation [] WNL    [x] Impaired [] Mild [x] Mod [x] Severe  Auditory retention/processing   [] WNL    [x] Impaired [] Mild [x] Mod [] Severe   Follow commands                                  [x] 1 Step [] 2 Step [] 3 Step [] complex  Body part ID      [x] WNL    [] Impaired [] Mild [] Mod [] Severe   Familiar object ID    [x] WNL    [] Impaired [] Mild [] Mod [] Severe          Expressive Language:  Verbalize basic orientation  [] WNL    [x] Impaired [] Mild [] Mod [x] Severe   Automatic speech   [] WNL    [x] Impaired [] Mild [] Mod [x] Severe  Able to identify objects/pictures         [] WNL    [x] Impaired [] Mild [] Mod [x] Severe  Repetition     []words    []phrases    Responsive naming   [] WNL    [x] Impaired [] Mild [] Mod [x] Severe   Complex [address] questions  [] WNL    [x] Impaired [] Mild [] Mod [x] Severe   Phrase completion   [] WNL    [x] Impaired [] Mild [] Mod [x] Severe   Sentence formulation   [] WNL    [x] Impaired [] Mild [] Mod [x] Severe           General Orientation:  Attention:  [x] Alert    [] Drowsy  Orientation:  [x] Person   [x] Place    [] Time  Attention to task:     [x] Able to redirect with cues Min:    Mod: X   Max:    Speech:  Oral Verbal Apraxia: [] None    [] Mild    [x] Moderate    [] Severe Dysarthria:  [] None    [] Mild    [] Moderate    [x] Severe   Intelligibility:  [] WNL    [x] Words   [] Phrases   [] Sentences   [] Conversation      % Intelligible:20    Writing:                      [x] L or [] R [] WNL    [x] Impaired @   [x] Word [] Sentence [] Paragraph          After treatment:   Patient reports abdominal pain unable to provide number, facial grimacing with touching of abdomen. COMMUNICATION/EDUCATION:   Patient, daughter and granddaughter receptive of extensive education on apraxia vs aphasia vs dysarthria, ST POC, speech language compensatory strategies, and goals. The patient's plan of care including recommendations, planned interventions, and recommended diet changes were discussed with: Registered nurse and Physician. Patient/family have participated as able in goal setting and plan of care. Patient/family agree to work toward stated goals and plan of care. Problem: Communication Impaired (Adult)  Goal: *Acute Goals and Plan of Care (Insert Text)  Description: -Patient will approximate name with mod cues within 7 days.   -Patient will perform automatic speech tasks with 90% accuracy with mod cues within 7 days.   -Patient will follow 2 step commands w/ min cues w/ 90% accuracy within 7 days.   -Patient will utilize communication board w/ a field of 2-4 with 90% accuracy w/ min cues within 7 days.   -Patient will answer basic yes/no questions w/ 90% accuracy w/ min cues within 7 days.      Outcome: Initial     Thank you for this referral.  Sebastien Shannon M.Ed., M.S., CCC-SLP  Time Calculation: 36 mins

## 2022-03-04 NOTE — PROGRESS NOTES
Spoke with wound care RN regarding specialty bed. She will order it and have her wound care tech deliver it to the unit once it becomes available.

## 2022-03-04 NOTE — PROGRESS NOTES
Physician Progress Note      Rachna Gould  CSN #:                  [id number]  :                       1945  ADMIT DATE:       2022 9:25 PM  [address] [place] DATE:  RESPONDING  PROVIDER #:        Yaz Tee MD          QUERY TEXT:    Dear Dr. Darwin Milan -    Patient admitted with CVA and noted with elevated troponin levels. If possible, please document in the progress notes and discharge summary if you are evaluating and/or treating any of the following: The medical record reflects the following:  Risk Factors: 78 F presented as stroke alert; MRI + for acute CVA; patient with elevated troponin levels  Clinical Indicators: troponin 671. ..483; per Cardiology consultant - troponin leak with unremarkable EKG and without anginal symptoms; H&P notes \"Non-STEMI with severely elevated troponin I\"  Treatment: labs, EKG, Cardiology consult, Xarelto, ASA, Neurology consult, Metoprolol    Thank you,  YUNIER Peterson, RN, CRCR  Clinical   [email or web address] or contact via [employer]  Options provided:  -- NSTEMI  -- Type 2 MI  -- Demand Ischemia with MI  -- Demand Ischemia only, no MI  -- Unstable Angina  -- Other - I will add my own diagnosis  -- Disagree - Not applicable / Not valid  -- Disagree - Clinically unable to determine / Unknown  -- Refer to Clinical Documentation Reviewer    PROVIDER RESPONSE TEXT:    This patient has an NSTEMI.     Query created by: Arian Hassan on 2022 12:01 PM      Electronically signed by:  Yaz Tee MD 3/4/2022 12:47 PM

## 2022-03-04 NOTE — PROGRESS NOTES
Renal Daily Progress Note    Admit Date: 2/21/2022      Subjective:   She appears to be more responsive but nonverbal.     Current Facility-Administered Medications   Medication Dose Route Frequency    insulin glargine (LANTUS) injection 25 Units  25 Units SubCUTAneous QHS    acetaminophen (TYLENOL) solution 650 mg  650 mg Per G Tube Q4H PRN    gabapentin (NEURONTIN) capsule 300 mg  300 mg Oral BID    0.45% sodium chloride infusion  25 mL/hr IntraVENous CONTINUOUS    piperacillin-tazobactam (ZOSYN) 3.375 g in 0.9% sodium chloride (MBP/ADV) 100 mL MBP  3.375 g IntraVENous Q8H    0.9% sodium chloride infusion 250 mL  250 mL IntraVENous PRN    insulin lispro (HUMALOG) injection   SubCUTAneous Q4H    latanoprost (XALATAN) 0.005 % ophthalmic solution 1 Drop  1 Drop Right Eye QPM    0.9% sodium chloride infusion 250 mL  250 mL IntraVENous PRN    zinc oxide-white petrolatum 20-75 % topical paste   Topical BID    lidocaine (XYLOCAINE) 4 % (40 mg/mL) topical solution   Topical PRN    [Held by provider] rivaroxaban (XARELTO) tablet 2.5 mg  2.5 mg Oral BID WITH MEALS    [Held by provider] atorvastatin (LIPITOR) tablet 80 mg  80 mg Oral DAILY    labetaloL (NORMODYNE;TRANDATE) 20 mg/4 mL (5 mg/mL) injection 10 mg  10 mg IntraVENous Q6H PRN    sodium chloride (NS) flush 5-40 mL  5-40 mL IntraVENous PRN    brimonidine (ALPHAGAN) 0.2 % ophthalmic solution 1 Drop  1 Drop Both Eyes TID    ferrous sulfate tablet 325 mg  325 mg Oral BID    glucose chewable tablet 16 g  4 Tablet Oral PRN    glucagon (GLUCAGEN) injection 1 mg  1 mg IntraMUSCular PRN    dextrose 10% infusion 125-250 mL  125-250 mL IntraVENous PRN    metoprolol succinate (TOPROL-XL) XL tablet 50 mg  50 mg Oral DAILY    amLODIPine (NORVASC) tablet 10 mg  10 mg Oral DAILY        Review of Systems    ROS   Not obtainable    Objective:     Patient Vitals for the past 8 hrs:   BP Temp Pulse Resp SpO2   03/04/22 1200 -- -- 76 -- --   03/04/22 0800 -- -- 76 -- --   03/04/22 0742 (!) 172/61 97.4 °F (36.3 °C) 76 20 100 %     03/04 0701 - 03/04 1900  In: 3000   Out: 3300 [Urine:3300]  03/02 1901 - 03/04 0700  In: 61800.2 [I.V.:824.2]  Out: 71475 [AGZUX:53518]    Physical Exam:   Physical Exam  Cardiovascular:      Rate and Rhythm: Regular rhythm. Pulmonary:      Breath sounds: Normal breath sounds. Abdominal:      General: Bowel sounds are normal.      Palpations: Abdomen is soft. Comments: PEG tube in place     Trace edema  She is nonverbal      CT HEAD WO CONT   Final Result   1. Evolving left FREDO territory ischemic infarct. No acute hemorrhage. 2.  Paranasal sinus disease. XR FOOT LT AP/LAT   Final Result      DUPLEX UPPER EXT VENOUS RIGHT   Final Result      US ABD COMP   Final Result   Gallbladder sludge without other acute abnormality. XR CHEST PORT   Final Result   The cardiomediastinal silhouette is appropriate for age, technique,   and lung expansion. Pulmonary vasculature is not congested. The lungs are   essentially clear. No effusion or pneumothorax is seen. CT HEAD WO CONT   Final Result   Negative for acute intracranial process. CT ABD PELV WO CONT   Final Result   Hemorrhage within the urinary bladder. Mild left   hydroureteronephrosis. XR SWALLOW FUNC VIDEO   Final Result   Penetration with thin consistency. No aspiration. CTA HEAD NECK W WO CONT   Final Result   Negative head and neck CTA. Please note that all carotid bifurcation stenoses are measured as per NASCET   criteria. CT CODE NEURO HEAD WO CONTRAST   Final Result   1. No acute large vessel intracranial ischemia, hemorrhage. Called report to   patient's nurse Rojelio Poll at 7:12 PM 2/22/2022.   2. Right basal ganglia lacunar infarct. 3. Periventricular microangiopathy. 4. Pansinusitis. See above. MRI BRAIN WO CONT   Final Result   Acute infarction left paracentral lobule and region of the white   matter of the left motor cortex. CT CODE NEURO HEAD WO CONTRAST   Final Result      1. No acute intracranial hemorrhage. 2. Left basal ganglia lacunar infarct, which is age indeterminate in the absence   of any prior outside studies, but favored to be chronic. 3. Moderate to severe paranasal sinus disease. Results called to Dr. Noble Romberg at 9:39 PM on 2/21/2022           Data Review   Recent Labs     03/04/22  0650   WBC 13.1*   HGB 7.5*   HCT 24.1*        Recent Labs     03/04/22  0650 03/03/22  0400 03/02/22  1019 03/02/22  0400 03/01/22  1515   * 148* 147*  --   --    K 4.6 5.0 4.5  --   --    * 117* 115*  --   --    CO2 27 26 28  --   --    * 169* 213*  --   --    BUN 34* 32* 34*  --   --    CREA 1.73* 1.55* 1.49*  --   --    CA 8.6 8.4* 8.5  --   --    PHOS  --   --   --  2.7  --    ALB 1.9* 1.7* 1.8*  --    < >   * 431* 569*  --    < >   INR 1.4* 1.3* 1.4*  --    < >    < > = values in this interval not displayed. No components found for: GLPOC  No results for input(s): PH, PCO2, PO2, HCO3, FIO2 in the last 72 hours. Recent Labs     03/04/22  0650 03/03/22  0400 03/02/22  1019   INR 1.4* 1.3* 1.4*         Assessment:           Active Problems:    CVA (cerebral vascular accident) ([facility] [address] (2/21/2022)      Elevated troponin (2/21/2022)    Acute kidney injury improving. Creatinine is worse today at 1.7 mg. She is likely to be nonoliguric. Modest hypernatremia-Free water deficit 2.8 L. Elevated transaminases- improving -  GI following    Normocytic anemia    Acute CVA    Diabetes mellitus with peripheral neuropathy    Hematuria on CBI as per urology    E. coli urinary tract infection    Modest hypophosphatemia - corrected. .    Hypertension. She has been started on amlodipine and metoprolol. Blood pressures are higher today. Plan:   Keep well-hydrated. On tube feeds with free water flushes. There is room to increase the metoprolol for the if the blood pressures remain elevated.

## 2022-03-04 NOTE — WOUND CARE
Patient transferred onto a P500 Low Air Loss bed for increased pressure reduction and climate control.

## 2022-03-04 NOTE — PROGRESS NOTES
Neurology Progress Note    Patient ID:  Cassandra Nina  [id number]  11 y.o.  1945    Subjective: The patient is seen in follow-up this morning and she is more responsive, said good morning in response and good in response to the question how she was doing and attempted to squeeze my finger with the right hand. She raised the left arm up on command. No sedation. Patient is a 78year old woman with HTN, DM who had a stroke last week with right facial droop and right sided weakness worked up in [facility] who was brought in for worsening speech and left sided weakness per daughters at bedside. She is living at home and uses a walker to ambulate at baseline. Ct head done yesterday shows old basal ganglia stroke. Her BP are elevated to 563B systolic.     Current Facility-Administered Medications   Medication Dose Route Frequency    acetaminophen (TYLENOL) solution 650 mg  650 mg Per G Tube Q4H PRN    gabapentin (NEURONTIN) capsule 300 mg  300 mg Oral BID    0.45% sodium chloride infusion  25 mL/hr IntraVENous CONTINUOUS    insulin glargine (LANTUS) injection 15 Units  15 Units SubCUTAneous QHS    piperacillin-tazobactam (ZOSYN) 3.375 g in 0.9% sodium chloride (MBP/ADV) 100 mL MBP  3.375 g IntraVENous Q8H    0.9% sodium chloride infusion 250 mL  250 mL IntraVENous PRN    insulin lispro (HUMALOG) injection   SubCUTAneous Q4H    latanoprost (XALATAN) 0.005 % ophthalmic solution 1 Drop  1 Drop Right Eye QPM    0.9% sodium chloride infusion 250 mL  250 mL IntraVENous PRN    zinc oxide-white petrolatum 20-75 % topical paste   Topical BID    lidocaine (XYLOCAINE) 4 % (40 mg/mL) topical solution   Topical PRN    [Held by provider] rivaroxaban (XARELTO) tablet 2.5 mg  2.5 mg Oral BID WITH MEALS    [Held by provider] atorvastatin (LIPITOR) tablet 80 mg  80 mg Oral DAILY    labetaloL (NORMODYNE;TRANDATE) 20 mg/4 mL (5 mg/mL) injection 10 mg  10 mg IntraVENous Q6H PRN    sodium chloride (NS) flush 5-40 mL  5-40 mL IntraVENous PRN    brimonidine (ALPHAGAN) 0.2 % ophthalmic solution 1 Drop  1 Drop Both Eyes TID    ferrous sulfate tablet 325 mg  325 mg Oral BID    glucose chewable tablet 16 g  4 Tablet Oral PRN    glucagon (GLUCAGEN) injection 1 mg  1 mg IntraMUSCular PRN    dextrose 10% infusion 125-250 mL  125-250 mL IntraVENous PRN    metoprolol succinate (TOPROL-XL) XL tablet 50 mg  50 mg Oral DAILY    amLODIPine (NORVASC) tablet 10 mg  10 mg Oral DAILY     Objective:     Patient Vitals for the past 8 hrs:   BP Temp Pulse Resp SpO2   03/04/22 0330 (!) 140/92 99.5 °F (37.5 °C) 79 20 100 %     No intake/output data recorded.   03/02 1901 - 03/04 0700  In: 42711.2 [I.V.:824.2]  Out: 18467 [Urine:69352]    Lab Review   Recent Results (from the past 24 hour(s))   GLUCOSE, POC    Collection Time: 03/03/22 11:00 AM   Result Value Ref Range    Glucose (POC) 248 (H) 65 - 117 mg/dL    Performed by [address] [place] [address], POC    Collection Time: 03/03/22  3:12 PM   Result Value Ref Range    Glucose (POC) 213 (H) 65 - 117 mg/dL    Performed by [address] [place] [address], POC    Collection Time: 03/03/22  8:23 PM   Result Value Ref Range    Glucose (POC) 180 (H) 65 - 117 mg/dL    Performed by Devin Thomas    GLUCOSE, POC    Collection Time: 03/03/22 11:23 PM   Result Value Ref Range    Glucose (POC) 239 (H) 65 - 117 mg/dL    Performed by Jovanny Argueta    GLUCOSE, POC    Collection Time: 03/04/22  2:08 AM   Result Value Ref Range    Glucose (POC) 244 (H) 65 - 117 mg/dL    Performed by Devin Thomas    CBC WITH AUTOMATED DIFF    Collection Time: 03/04/22  6:50 AM   Result Value Ref Range    WBC 13.1 (H) 3.6 - 11.0 K/uL    RBC 2.61 (L) 3.80 - 5.20 M/uL    HGB 7.5 (L) 11.5 - 16.0 g/dL    HCT 24.1 (L) 35.0 - 47.0 %    MCV 92.3 80.0 - 99.0 FL    MCH 28.7 26.0 - 34.0 PG    MCHC 31.1 30.0 - 36.5 g/dL    RDW 16.6 (H) 11.5 - 14.5 %    PLATELET 708 743 - 124 K/uL    MPV 11.5 8.9 - 12.9 FL    NRBC 0.3 (H) 0.0  WBC ABSOLUTE NRBC 0.04 (H) 0.00 - 0.01 K/uL    NEUTROPHILS 76 (H) 32 - 75 %    LYMPHOCYTES 17 12 - 49 %    MONOCYTES 4 (L) 5 - 13 %    EOSINOPHILS 2 0 - 7 %    BASOPHILS 0 0 - 1 %    IMMATURE GRANULOCYTES 1 (H) 0 - 0.5 %    ABS. NEUTROPHILS 10.0 (H) 1.8 - 8.0 K/UL    ABS. LYMPHOCYTES 2.2 0.8 - 3.5 K/UL    ABS. MONOCYTES 0.5 0.0 - 1.0 K/UL    ABS. EOSINOPHILS 0.2 0.0 - 0.4 K/UL    ABS. BASOPHILS 0.1 0.0 - 0.1 K/UL    ABS. IMM. GRANS. 0.1 (H) 0.00 - 0.04 K/UL    DF AUTOMATED     METABOLIC PANEL, BASIC    Collection Time: 03/04/22  6:50 AM   Result Value Ref Range    Sodium 148 (H) 136 - 145 mmol/L    Potassium 4.6 3.5 - 5.1 mmol/L    Chloride 118 (H) 97 - 108 mmol/L    CO2 27 21 - 32 mmol/L    Anion gap 3 (L) 5 - 15 mmol/L    Glucose 242 (H) 65 - 100 mg/dL    BUN 34 (H) 6 - 20 mg/dL    Creatinine 1.73 (H) 0.55 - 1.02 mg/dL    BUN/Creatinine ratio 20 12 - 20      GFR est AA 35 (L) >60 ml/min/1.73m2    GFR est non-AA 29 (L) >60 ml/min/1.73m2    Calcium 8.6 8.5 - 10.1 mg/dL   PROTHROMBIN TIME + INR    Collection Time: 03/04/22  6:50 AM   Result Value Ref Range    Prothrombin time 16.7 (H) 11.9 - 14.6 sec    INR 1.4 (H) 0.9 - 1.1     HEPATIC FUNCTION PANEL    Collection Time: 03/04/22  6:50 AM   Result Value Ref Range    Protein, total 6.9 6.4 - 8.2 g/dL    Albumin 1.9 (L) 3.5 - 5.0 g/dL    Globulin 5.0 (H) 2.0 - 4.0 g/dL    A-G Ratio 0.4 (L) 1.1 - 2.2      Bilirubin, total 0.3 0.2 - 1.0 mg/dL    Bilirubin, direct 0.1 0.0 - 0.2 mg/dL    Alk. phosphatase 107 45 - 117 U/L    AST (SGOT) 33 15 - 37 U/L    ALT (SGPT) 296 (H) 12 - 78 U/L   GLUCOSE, POC    Collection Time: 03/04/22  7:29 AM   Result Value Ref Range    Glucose (POC) 230 (H) 65 - 117 mg/dL    Performed by Claudio Bae      Additional comments: MRI of the brain shows right paracentral lobule infarct    NEUROLOGICAL EXAM:  Appearance: The patient is well developed, much more awake. She responded by saying good morning and good in response to how she was doing. Mental Status:  She is more awake and alert. Cranial Nerves:   Intact visual fields. GUMARO, EOM's full, no nystagmus, no ptosis. Facial movement is asymmetric with moderate flattening of the right nasolabial fold. Motor:   Moves all extremities to command may be right side slightly weaker than the right. Reflexes:   Deep tendon reflexes not checked. Sensory:   Normal to touch, pinprick. Gait:   Cannot be checked. Tremor:   No tremor noted. Cerebellar:   Could not be checked. Neurovascular:  Normal heart sounds and regular rhythm     Assessment:   1. Acute infarction left paracentral lobule and region of the white matter of the left motor cortex: This is the cause of her new right-sided weakness. 2.  Lethargy/encephalopathy: She is better, still slow in her responses. She has diabetic neuropathy which causes neuropathic pain. She is supposed to be taking gabapentin which has been on hold due to sedation  3. Hypertension. 4.  Diabetes mellitus  Plan:   1.  Gabapentin is on hold for now. Once she starts becoming more awake we'll start at 100 mg 3 times daily. 2.  Please give Ativan 1 mg only if she goes into a grand mal seizure lasting for more than 3 minutes. 3.  Spoke to the daughter Darrick Layton at length yesterday, updating her about the condition of the patient and the fact that she did responded better today and followed more commands. 4.  Her CT scan of the head without contrast showed a left FREDO distribution older looking infarct, but newer than the last MRI of a week ago. Patient probably will need JUS to further evaluate for possible embolic source.     Thank you,    Signed:  Crissy Young MD  3/4/2022  11:46 AM

## 2022-03-04 NOTE — PROGRESS NOTES
Progress Note  Date:3/4/2022       Room:[facility]  Patient Jovanny Griffin     YOB: 1945     Age:77 y.o. Subjective    Subjective   Pt seen at [facility]. Transferred out of ICU since last visit. No acute overnight events per nursing. Review of Systems   Unable to accurately obtain due to patient's current medical status    Objective         Vitals Last 24 Hours:  TEMPERATURE:  Temp  Av.6 °F (37 °C)  Min: 97.4 °F (36.3 °C)  Max: 99.5 °F (37.5 °C)  RESPIRATIONS RANGE: Resp  Av.5  Min: 20  Max: 23  PULSE OXIMETRY RANGE: SpO2  Av.2 %  Min: 97 %  Max: 100 %  PULSE RANGE: Pulse  Av.9  Min: 71  Max: 79  BLOOD PRESSURE RANGE: Systolic (33ANZ), TAN:984 , Min:135 , JPA:941   ; Diastolic (04CPR), ALF:02, Min:51, Max:92    I/O (24Hr): Intake/Output Summary (Last 24 hours) at 3/4/2022 1624  Last data filed at 3/4/2022 1132  Gross per 24 hour   Intake 70655 ml   Output 5300 ml   Net 90929 ml     Objective   GEN: Pt is in NAD. No dressing noted to the B/L feet. Daughters noted at [facility]. Bunny boots noted to be in place  DERM: Dry gangrene noted to the left great toe. +PTB. No proximal lymphatic streaking noted  VASC: Pedal pulses (DP/PT) palpable to B/L LE. CFT<3sec to all digits of B/L LE. No pedal hair growth noted to the level of the digits for B/L LE. Skin temp is warm to warm from proximal to distal for B/L LE.   NEURO: Unable to accurately assess due to current medical status  MSK: No gross deformities.  Good muscle tone and bulk noted to B/L LE.  PSYCH: Sedated    Labs/Imaging/Diagnostics    Labs:  CBC:  Recent Labs     22  0650   WBC 13.1*   RBC 2.61*   HGB 7.5*   HCT 24.1*   MCV 92.3   RDW 16.6*        CHEMISTRIES:  Recent Labs     22  0650 22  0400 22  1019 22  0400   * 148* 147*  --    K 4.6 5.0 4.5  --    * 117* 115*  --    CO2 27 26 28  --    BUN 34* 32* 34*  --    CA 8.6 8.4* 8.5  --    PHOS  --   -- --  2.7   PT/INR:  Recent Labs     03/04/22  0650 03/03/22  0400 03/02/22  1019   INR 1.4* 1.3* 1.4*     APTT:No results for input(s): APTT in the last 72 hours. LIVER PROFILE:  Recent Labs     03/04/22  0650 03/03/22  0400 03/02/22  1019   AST 33 57* 62*   * 431* 569*     Lab Results   Component Value Date/Time    ALT (SGPT) 296 (H) 03/04/2022 06:50 AM    AST (SGOT) 33 03/04/2022 06:50 AM    Alk. phosphatase 107 03/04/2022 06:50 AM    Bilirubin, direct 0.1 03/04/2022 06:50 AM    Bilirubin, total 0.3 03/04/2022 06:50 AM       Imaging Last 24 Hours:  CT HEAD WO CONT    Result Date: 3/3/2022  Study: Head CT without contrast. Clinical Indication: Stroke. Comparison: Head CT dated 2/25/2022. Technique: Routine volume acquisition of the head was performed without contrast using soft tissue and bone kernels. Coronal and sagittal reconstructions were generated and reviewed. Dose reduction: All CT scans at this facility are performed using dose reduction optimization techniques as appropriate to a performed exam including the following-automated exposure control, adjustments of mA and/or Kv according to patient size, or use of iterative reconstructive technique. Findings: Evolving ischemic infarct involving the anterior aspect the left frontal lobe and corpus callosum within the anterior cerebral artery distribution which is not evident on the CT dated 2/25/2022. Partial effacement of the frontal horn of left lateral ventricle. No midline shift. The basal cisterns remain patent. No acute hemorrhage or abnormal extra-axial fluid. Intracranial vascular calcifications. Unremarkable orbits. Complete opacification of the maxillary sinuses and near complete opacification of the right frontal sinus, sphenoid sinus mucosal thickening, and scattered opacification of multiple ethmoid air cells. The mastoid air cells are clear. No evidence of an aggressive calvarial or extracalvarial lesion.      1.  Evolving left FREDO territory ischemic infarct. No acute hemorrhage. 2.  Paranasal sinus disease. Assessment//Plan   Active Problems:    CVA (cerebral vascular accident) ([address] (2/21/2022)      Elevated troponin (2/21/2022)      Assessment & Plan    Dry Gangrene, Left Great Toe  Uncontrolled DM T2  PAD        Patient seen and evaluated at bedside in the ICU  - Current labs personally reviewed  - XR images of the left foot personally reviewed noting vascular calcifications with osseous erosion to the distal aspect of the distal phalanx of left great toe  - Non invasive vascular studies ordered to interrogate the arterial inflow for wound healing purposes to the left lower extremity  - Continue to leave open to air at this time no wound care needed  - Strict offloading for wound healing purposes and ulcer prevention  - Abx per ID  - I will follow closely           Victor Hugo Novoa, [address] [facility], [address] [facility] [place] [address] [place] [address]  [address] [facility], [address] [place] [address] Goo:  [phone]  F:  [phone]  C:  [phone]    Electronically signed by Kadeem Phelan DPM on 3/4/2022 at 4:24 PM

## 2022-03-04 NOTE — PROGRESS NOTES
CM reviewed clinical chart. Patient's family would like her to discharge to an IRF. CM spoke with patient's daughter, Gely Ozuna ([phone]), who gave choice for [facility] at [address]. Referral sent via [facility]. CM team will continue to follow.

## 2022-03-04 NOTE — PROGRESS NOTES
OCCUPATIONAL THERAPY TREATMENT  Patient: Jerilynn Krabbe (64 y.o. female)  Date: 3/4/2022  Diagnosis: CVA (cerebral vascular accident) ([facility] [address] [I63.9]  Elevated troponin [R77.8] <principal problem not specified>  Procedure(s) (LRB):  PERCUTANEOUS ENDOSCOPIC GASTROSTOMY TUBE INSERTION (N/A)  ESOPHAGOGASTRODUODENOSCOPY (EGD) (N/A) 2 Days Post-Op  Precautions: fall risk   Chart, occupational therapy assessment, plan of care, and goals were reviewed. ASSESSMENT    Patient continues with skilled OT services and is progressing towards goals. Pt is a 77 y/o F with PMH of DM, HTN, hyperlipidemia, neuropathy, PAD, and sciatica presenting to [facility] with c/o slurred speech and extremity weakness, admitted 02/21/22 and being treated for facial weakness, chronic renal impairment, and elevated troponin. Pt was recently admitted to [facility] due to stroke-like symptoms last week and was d/c home. After admission to [facility], pt had a code stroke called last night, 02/22/22, due to pt demonstrating new onset of increased confusion, L facial droop, R sided weakness, and slurred speech. Pt's MRI results show a chronic lacunar infarct of the L basal ganglia as well as an acute infarction of L paracentral lobule and region of the white matter of L motor cortex. Per pt report from initial eval, pt lives with her daughter in a one-story home with a ramp entrance, was receiving assist with all ADLs from her daughter and was mod I using a wheelchair at [facility]. Other DME owned includes: rollator and RW. Pt's daughter reports pt would use the RW to stand pivot transfer to the [facility] and would use the rollator for others to push pt when she was feeling weak. Pt's daughter educated on gait belt and safe stand pivot transfer technique during initial eval as pt's daughter stated she recently purchased a gait belt to assist with transferring pt.  Pt transferred to ICU on 02/25/22 and then transferred to [address] [place] [address] on 03/03/22 for progression of care and is due for an OT RA at this time. Based on current observations, pt presents with deficits in strength/AROM and functional activity tolerance impacting overall performance of ADLs and functional transfers/mobility. Pt demonstrates non-functional AROM and strength at this time. Pt demonstrates minimal  strength, elbow flexion, and shoulder flexion of LUE. Pt will follow commands when asked to squeeze therapists fingers and assist with completing AROM of LUE. Pt able to complete full PROM of LUE. Pt appears to have a flaccid RUE, with pt unable to assist with AROM at this time. Pt able to complete full PROM of RUE. Pt currently requires total Ax2 for bed mobility while rolling side to side to be cleaned up. Pt requires total A while semi-supine in bed to wash face with a warm wash cloth. Pt requires total A to complete oral care. Pt continues to benefit from skilled acute OT services at this time to improve IND and safety with functional mobility/transfers and self care. Pt tolerated session fair. Recommending SNF at discharge once pt is medically appropriate. Current Level of Function Impacting Discharge (ADLs): total Ax2    Other factors to consider for discharge: home support, PLOF, severity of deficits         PLAN :  Patient continues to benefit from skilled intervention to address the above impairments. Continue treatment per established plan of care. to address goals. Recommend next OT session: bed level grooming and ROM, sit EOB as tolerated    Recommendation for discharge: (in order for the patient to meet his/her long term goals)  Elie Patel    This discharge recommendation:  Has been made in collaboration with the attending provider and/or case management    IF patient discharges home will need the following DME: patient owns DME required for discharge       SUBJECTIVE:   Pt unable to verbalize. Pt appeared calm throughout session.     OBJECTIVE DATA SUMMARY:   Cognitive/Behavioral Status:  Neurologic State: Alert  Orientation Level: Oriented to person  Cognition: Follows commands             Functional Mobility and Transfers for ADLs:  Bed Mobility:  Rolling: Total assistance;Assist x2      ADL Intervention:       Grooming  Position Performed: Other (comment) (Semi-supine in bed)  Washing Face: Total assistance (dependent)  Brushing Teeth: Total assistance (dependent) (Simulated, oral care)      Toileting  Bowel Hygiene: Total assistance (dependent)  Clothing Management: Total assistance (dependent)         Therapeutic Exercises:   Pt would benefit from UE HEP. [facility] AM-PACTM \"6 Clicks\"                                                       Daily Activity Inpatient Short Form  How much help from another person does the patient currently need. .. Total; A Lot A Little None   1. Putting on and taking off regular lower body clothing? [x]  1 []  2 []  3 []  4   2. Bathing (including washing, rinsing, drying)? [x]  1 []  2 []  3 []  4   3. Toileting, which includes using toilet, bedpan or urinal? [x] 1 []  2 []  3 []  4   4. Putting on and taking off regular upper body clothing? [x]  1 []  2 []  3 []  4   5. Taking care of personal grooming such as brushing teeth? [x]  1 []  2 []  3 []  4   6. Eating meals? [x]  1 []  2 []  3 []  4   © 2007, Trustees of [facility] [employer], under license to [employer]. All rights reserved     Score: 6/24     Interpretation of Tool:  Represents clinically-significant functional categories (i.e. Activities of daily living). Percentage of Impairment CH    0%   CI    1-19% CJ    20-39% CK    40-59% CL    60-79% CM    80-99% CN     100%   [facility]  Score 6-24 24 23 20-22 15-19 10-14 7-9 6         Pain:  0/10    Activity Tolerance:   Fair  Please refer to the flowsheet for vital signs taken during this treatment.     After treatment patient left in no apparent distress:   Supine in bed, Heels elevated for pressure relief, Call bell within reach, Caregiver / family present and side rails x4    COMMUNICATION/COLLABORATION:   The patients plan of care was discussed with: Physical therapist, Registered nurse and Certified nursing assistant/patient care technician. PT/OT sessions occurred together for increased safety of pt and clinician. Phoebe Ocasio OT  Time Calculation: 41 mins   Problem: Self Care Deficits Care Plan (Adult)  Goal: *Acute Goals and Plan of Care (Insert Text)  Description: 1. Pt will be min A sup <> sit in prep for EOB ADLs  2. Pt will be mod I grooming sitting EOB  3. Pt will be min A sit <> stand/stand pivot transfer in prep for toileting LRAD  4. Pt will be min A toileting/toilet transfer/cloth mgmt LRAD  6.  Pt will be mod I following UE HEP in prep for self care tasks      Outcome: Progressing Towards Goal

## 2022-03-04 NOTE — PROGRESS NOTES
PHYSICAL THERAPY REEVALUATION  Patient: Gely Garcia (89 y.o. female)  Date: 3/4/2022  Primary Diagnosis: CVA (cerebral vascular accident) ([facility] [address] [I63.9]  Elevated troponin [R77.8]  Procedure(s) (LRB):  PERCUTANEOUS ENDOSCOPIC GASTROSTOMY TUBE INSERTION (N/A)  ESOPHAGOGASTRODUODENOSCOPY (EGD) (N/A) 2 Days Post-Op   Precautions: falls, aspiration         ASSESSMENT  Pt is a 79 yo female admitted on 2/21/2022 for slurred speech and recent change in status per medical records; pt currently being treated for CVA, NSTEMI, renal failure, and elevated troponin. Pt and pts daughter also reporting pain with urination, MD in room at end of session and made aware. Brain MRI showed acute infarction left paracentral lobule and region of the white matter of the left motor cortex.     PMH: DM, HTN, HLD, neuropathy, PAD, sciatica, cervical fusion, right hallux amputation, and h/o stent placements. Per PT evaluation pt resides with daughter in a 1 story home with ramp to enter, pt required assistance for ADLS/IADLS, WC with primary mobility prior to admission but was able to use RW for short distance amb/transfers with daughter assistance. DME: WC, RW, BSC, rollator. Patient initially seen for PT evaluation 2/23 and no skilled PT sessions since evalution. Patient seen today for reevaluation for LOS as well as following ICU transfer. Patient A&O x0. Pt semi-supine in bed upon PT/OT arrival, agreeable to session. Based on the objective data described below, patient currently presents with generalized deconditioning, poor activity tolerance, decreased activity tolerance, increased need for A with amb and functional mobility/transfers. Pt remains lethargic with intermittent command following and increased need for assistance. While performing AAROM/PROM of bilateral LE pt noted to be soiled. With assistance from PCT, PT/OT performed linen, pad, and pt clean up. Patient required total Ax2 bed mobility.  Pt did poor with session today limited command following and sig decline in functional mobility noted compared to evaluation completed prior to ICU evaluation. Pt will continue to benefit from continued skilled PT services, continue to progress toward goals. Recommend discharge to SNF when medically appropriate. Current Level of Function Impacting Discharge (mobility/balance): total Ax2    Other factors to consider for discharge: severity of deficits, acute medical state     Patient will benefit from skilled therapy intervention to address the above noted impairments. PLAN :  Recommendations and Planned Interventions: bed mobility training, transfer training, gait training, therapeutic exercises, neuromuscular re-education, patient and family training/education and therapeutic activities      Frequency/Duration: Patient will be followed by physical therapy:  2-3x/week to address goals. Recommendation for discharge: (in order for the patient to meet his/her long term goals)  Elie Patel    This discharge recommendation:  Has been made in collaboration with the attending provider and/or case management    Equipment recommendations for successful discharge (if) home: none         SUBJECTIVE:   Patient stated ok.  when asked to participate with therapy    OBJECTIVE DATA SUMMARY:   HISTORY:    Past Medical History:   Diagnosis Date    Diabetes mellitus ([facility] [address]     HTN (hypertension)     Hyperlipidemia     Neuropathy     PAD (peripheral artery disease) (HCC)     PCI    Sciatica      Past Surgical History:   Procedure Laterality Date    HX AMPUTATION Right     great toe    HX CERVICAL FUSION      HX OTHER SURGICAL Bilateral     Stent placement    HX OTHER SURGICAL      HX VASCULAR STENT Bilateral     2 in right 1 in left    HX VASCULAR STENT Bilateral        Home Situation  Home Environment: Private residence  # Steps to Enter: 0  Wheelchair Ramp: Yes  One/Two Story Residence: One story  Living Alone: No  Support Systems: Child(nelida)  Patient Expects to be Discharged to[de-identified] Home  Current DME Used/Available at Home: Wheelchair,Walker, rollator,Walker, rolling,Commode, bedside    EXAMINATION/PRESENTATION/DECISION MAKING:   Critical Behavior:  Neurologic State: Alert  Orientation Level: Oriented to person  Cognition: Follows commands  Safety/Judgement: Decreased insight into deficits  Hearing:     Skin:  Several open areas noted to sacrum, jpaste and mepilex applied after removal of soiled mepilex pad; left great toe with blackened tip  Edema: none noted   Range Of Motion:  AROM: Grossly decreased, non-functional                       Strength:    Strength: Grossly decreased, non-functional                    Tone & Sensation:                                  Coordination:  Coordination: Grossly decreased, non-functional  Vision:      Functional Mobility:  Bed Mobility:  Rolling: Total assistance;Assist x2           Transfers:                             Balance:      Ambulation/Gait Training:        Therapeutic Exercises: Ankle pumps, heelslides, and hip abduction/adduction 5x BLE    Functional Measure:  [facility] [address] [address] AM-PAC 6 Clicks         Basic Mobility Inpatient Short Form  How much difficulty does the patient currently have. .. Unable A Lot A Little None   1. Turning over in bed (including adjusting bedclothes, sheets and blankets)? [] 1   [x] 2   [] 3   [] 4   2. Sitting down on and standing up from a chair with arms ( e.g., wheelchair, bedside commode, etc.)   [x] 1   [] 2   [] 3   [] 4   3. Moving from lying on back to sitting on the side of the bed? [x] 1   [] 2   [] 3   [] 4          How much help from another person does the patient currently need. .. Total A Lot A Little None   4. Moving to and from a bed to a chair (including a wheelchair)? [x] 1   [] 2   [] 3   [] 4   5. Need to walk in hospital room? [x] 1   [] 2   [] 3   [] 4   6. Climbing 3-5 steps with a railing?    [x] 1   [] 2 [] 3   [] 4   © , Trustees of [facility], under license to [employer]. All rights reserved     Score:  Initial:  Most Recent: X (Date: 3/4/22 )   Interpretation of Tool:  Represents activities that are increasingly more difficult (i.e. Bed mobility, Transfers, Gait). Score 24 23 22-20 19-15 14-10 9-7 6   Modifier CH CI CJ CK CL CM CN       Pain Ratin/10 reported, no grimace sign noted     Activity Tolerance:   Poor    After treatment patient left in no apparent distress:   Supine in bed, Call bell within reach, Caregiver / family present and Side rails x 3 and nsg updated. Goals:    Problem: Mobility Impaired (Adult and Pediatric)  Goal: *Acute Goals and Plan of Care (Insert Text)  Description: Revised 3/4/22  Pt will be I with LE HEP in 7 days. Pt will perform bed mobility with min Ax1 in 7 days. Pt will perform transfers with min Ax1 in 7 days. Pt will amb 5-10 feet with LRAD safely with min Ax1 in 7 days. Pt will demonstrate improvement in dynamic seated and standing balance with min A in 7 days. Outcome: Progressing Towards Goal    COMMUNICATION/EDUCATION:   The patients plan of care was discussed with: Occupational therapist, Registered nurse and Case management. Patient is unable to participate in goal setting and plan of care.     Thank you for this referral.  Angelo Pinedo, PT, DPT   Time Calculation: 42 mins

## 2022-03-04 NOTE — PROGRESS NOTES
Progress Note    Patient: Abdoulaye Napoles MRN: [id number]  SSN: [id number]    YOB: 1945  Age: 78 y.o. Sex: female      Admit Date: 2/21/2022    LOS: 7 days     Subjective:     78years old with multiple CVAs, multiorgan failure with UTI sepsis, possible non-STEMI, shock liver, hepatitis C, being followed by neurologist, infectious disease, nephrology seen by the bedside. Patient is more alert and says some phrases but uncomprehensible. Objective:     Vitals:    03/03/22 1820 03/03/22 1940 03/03/22 2318 03/04/22 0330   BP: (!) 146/62 (!) 152/55 (!) 135/59 (!) 140/92   Pulse: 74 73 71 79   Resp: 20 20 20 20   Temp: 97.7 °F (36.5 °C) 98.9 °F (37.2 °C) 99.4 °F (37.4 °C) 99.5 °F (37.5 °C)   SpO2: 100% 100% 97% 100%   Weight:       Height:            Intake and Output:  Current Shift: No intake/output data recorded. Last three shifts: 03/02 1901 - 03/04 0700  In: 45773.2 [I.V.:824.2]  Out: 58992 [Urine:53644]    Physical Exam:   General:   Mentation slightly improved   Eyes:     Ears:  Normal TMs and external ear canals both ears. Nose: Nares normal. Septum midline. Mucosa normal. No drainage or sinus tenderness. Mouth/Throat: Lips, mucosa, and tongue normal. Teeth and gums normal.   Neck: Supple, symmetrical, trachea midline, no adenopathy, thyroid: no enlargment/tenderness/nodules, no carotid bruit and no JVD. Back:   Symmetric, no curvature. ROM normal. No CVA tenderness. Lungs:   Clear to auscultation bilaterally. Heart:  Regular rate and rhythm, S1, S2 normal, no murmur, click, rub or gallop. Abdomen:   Soft, non-tender. Bowel sounds normal. No masses,  No organomegaly. Extremities: Extremities normal, atraumatic, no cyanosis or edema. Pulses: 2+ and symmetric all extremities. Skin: Skin color, texture, turgor normal. No rashes or lesions   Lymph nodes: Cervical, supraclavicular, and axillary nodes normal.   Weakness diffuse.   But worse  on the right side of the body Lab/Data Review: All lab results for the last 24 hours reviewed. Recent Results (from the past 24 hour(s))   GLUCOSE, POC    Collection Time: 03/03/22 11:00 AM   Result Value Ref Range    Glucose (POC) 248 (H) 65 - 117 mg/dL    Performed by [address] [place] [address], POC    Collection Time: 03/03/22  3:12 PM   Result Value Ref Range    Glucose (POC) 213 (H) 65 - 117 mg/dL    Performed by [address] [place] [address], POC    Collection Time: 03/03/22  8:23 PM   Result Value Ref Range    Glucose (POC) 180 (H) 65 - 117 mg/dL    Performed by Deanna Dakota    GLUCOSE, POC    Collection Time: 03/03/22 11:23 PM   Result Value Ref Range    Glucose (POC) 239 (H) 65 - 117 mg/dL    Performed by Vin Suazo    GLUCOSE, POC    Collection Time: 03/04/22  2:08 AM   Result Value Ref Range    Glucose (POC) 244 (H) 65 - 117 mg/dL    Performed by Deanna Dakota    CBC WITH AUTOMATED DIFF    Collection Time: 03/04/22  6:50 AM   Result Value Ref Range    WBC 13.1 (H) 3.6 - 11.0 K/uL    RBC 2.61 (L) 3.80 - 5.20 M/uL    HGB 7.5 (L) 11.5 - 16.0 g/dL    HCT 24.1 (L) 35.0 - 47.0 %    MCV 92.3 80.0 - 99.0 FL    MCH 28.7 26.0 - 34.0 PG    MCHC 31.1 30.0 - 36.5 g/dL    RDW 16.6 (H) 11.5 - 14.5 %    PLATELET 576 704 - 857 K/uL    MPV 11.5 8.9 - 12.9 FL    NRBC 0.3 (H) 0.0  WBC    ABSOLUTE NRBC 0.04 (H) 0.00 - 0.01 K/uL    NEUTROPHILS 76 (H) 32 - 75 %    LYMPHOCYTES 17 12 - 49 %    MONOCYTES 4 (L) 5 - 13 %    EOSINOPHILS 2 0 - 7 %    BASOPHILS 0 0 - 1 %    IMMATURE GRANULOCYTES 1 (H) 0 - 0.5 %    ABS. NEUTROPHILS 10.0 (H) 1.8 - 8.0 K/UL    ABS. LYMPHOCYTES 2.2 0.8 - 3.5 K/UL    ABS. MONOCYTES 0.5 0.0 - 1.0 K/UL    ABS. EOSINOPHILS 0.2 0.0 - 0.4 K/UL    ABS. BASOPHILS 0.1 0.0 - 0.1 K/UL    ABS. IMM.  GRANS. 0.1 (H) 0.00 - 0.04 K/UL    DF AUTOMATED     METABOLIC PANEL, BASIC    Collection Time: 03/04/22  6:50 AM   Result Value Ref Range    Sodium 148 (H) 136 - 145 mmol/L    Potassium 4.6 3.5 - 5.1 mmol/L    Chloride 118 (H) 97 - 108 mmol/L    CO2 27 21 - 32 mmol/L    Anion gap 3 (L) 5 - 15 mmol/L    Glucose 242 (H) 65 - 100 mg/dL    BUN 34 (H) 6 - 20 mg/dL    Creatinine 1.73 (H) 0.55 - 1.02 mg/dL    BUN/Creatinine ratio 20 12 - 20      GFR est AA 35 (L) >60 ml/min/1.73m2    GFR est non-AA 29 (L) >60 ml/min/1.73m2    Calcium 8.6 8.5 - 10.1 mg/dL   PROTHROMBIN TIME + INR    Collection Time: 03/04/22  6:50 AM   Result Value Ref Range    Prothrombin time 16.7 (H) 11.9 - 14.6 sec    INR 1.4 (H) 0.9 - 1.1     HEPATIC FUNCTION PANEL    Collection Time: 03/04/22  6:50 AM   Result Value Ref Range    Protein, total 6.9 6.4 - 8.2 g/dL    Albumin 1.9 (L) 3.5 - 5.0 g/dL    Globulin 5.0 (H) 2.0 - 4.0 g/dL    A-G Ratio 0.4 (L) 1.1 - 2.2      Bilirubin, total 0.3 0.2 - 1.0 mg/dL    Bilirubin, direct 0.1 0.0 - 0.2 mg/dL    Alk. phosphatase 107 45 - 117 U/L    AST (SGOT) 33 15 - 37 U/L    ALT (SGPT) 296 (H) 12 - 78 U/L   GLUCOSE, POC    Collection Time: 03/04/22  7:29 AM   Result Value Ref Range    Glucose (POC) 230 (H) 65 - 117 mg/dL    Performed by Berny Chester      CT of the head without contrast    IMPRESSION  1. Evolving left FREDO territory ischemic infarct. No acute hemorrhage. 2.  Paranasal sinus disease. Assessment:     Active Problems:    CVA (cerebral vascular accident) ([address] (2/21/2022)      Elevated troponin (2/21/2022)      As above  Plan:     Inform cardiology for possible JUS.   Neurology still following    Signed By: Ivana Ganser, MD     March 4, 2022

## 2022-03-05 NOTE — CONSULTS
Neurology Progress Note    Patient ID:  Kiya Goldman  [id number]  78 y.o.  1945    Subjective: patient opens eyes. Not talking or following commands       Patient is a 78year old woman with HTN, DM who had a stroke last week with right facial droop and right sided weakness worked up in [facility] who was brought in for worsening speech and left sided weakness per daughters at bedside. She is living at home and uses a walker to ambulate at baseline. She has had multiple strokes including left parasaggital stroke and a left Melany stroke after that. All antiplts and anticoagulating agents held due to hematuria. Hematuria occurred when she was on asa 81mg and xarelto 2.5 mg bid. storvastatin also held due to rising liver function tests.      Current Facility-Administered Medications   Medication Dose Route Frequency    linezolid (ZYVOX) tablet 600 mg  600 mg Oral Q12H    insulin glargine (LANTUS) injection 25 Units  25 Units SubCUTAneous QHS    acetaminophen (TYLENOL) solution 650 mg  650 mg Per G Tube Q4H PRN    gabapentin (NEURONTIN) capsule 300 mg  300 mg Oral BID    0.45% sodium chloride infusion  25 mL/hr IntraVENous CONTINUOUS    piperacillin-tazobactam (ZOSYN) 3.375 g in 0.9% sodium chloride (MBP/ADV) 100 mL MBP  3.375 g IntraVENous Q8H    0.9% sodium chloride infusion 250 mL  250 mL IntraVENous PRN    insulin lispro (HUMALOG) injection   SubCUTAneous Q4H    latanoprost (XALATAN) 0.005 % ophthalmic solution 1 Drop  1 Drop Right Eye QPM    0.9% sodium chloride infusion 250 mL  250 mL IntraVENous PRN    zinc oxide-white petrolatum 20-75 % topical paste   Topical BID    lidocaine (XYLOCAINE) 4 % (40 mg/mL) topical solution   Topical PRN    [Held by provider] rivaroxaban (XARELTO) tablet 2.5 mg  2.5 mg Oral BID WITH MEALS    [Held by provider] atorvastatin (LIPITOR) tablet 80 mg  80 mg Oral DAILY    labetaloL (NORMODYNE;TRANDATE) 20 mg/4 mL (5 mg/mL) injection 10 mg  10 mg IntraVENous Q6H PRN    sodium chloride (NS) flush 5-40 mL  5-40 mL IntraVENous PRN    brimonidine (ALPHAGAN) 0.2 % ophthalmic solution 1 Drop  1 Drop Both Eyes TID    ferrous sulfate tablet 325 mg  325 mg Oral BID    glucose chewable tablet 16 g  4 Tablet Oral PRN    glucagon (GLUCAGEN) injection 1 mg  1 mg IntraMUSCular PRN    dextrose 10% infusion 125-250 mL  125-250 mL IntraVENous PRN    metoprolol succinate (TOPROL-XL) XL tablet 50 mg  50 mg Oral DAILY    amLODIPine (NORVASC) tablet 10 mg  10 mg Oral DAILY     Objective:     Patient Vitals for the past 8 hrs:   BP Temp Pulse Resp SpO2   03/05/22 0715 (!) 168/67 99.7 °F (37.6 °C) 75 20 99 %   03/05/22 0600 -- 100.4 °F (38 °C) -- -- --   03/05/22 0414 -- (!) 100.8 °F (38.2 °C) -- -- --   03/05/22 0229 -- (!) 101.2 °F (38.4 °C) -- -- --     No intake/output data recorded.   03/03 1901 - 03/05 0700  In: 43570   Out: 18 [Urine:5300]    Lab Review   Recent Results (from the past 24 hour(s))   GLUCOSE, POC    Collection Time: 03/04/22 11:37 AM   Result Value Ref Range    Glucose (POC) 289 (H) 65 - 117 mg/dL    Performed by Javi Amezquita, POC    Collection Time: 03/04/22  4:07 PM   Result Value Ref Range    Glucose (POC) 286 (H) 65 - 117 mg/dL    Performed by Meghan StricklandTrvgonzalo)    GLUCOSE, POC    Collection Time: 03/04/22  8:20 PM   Result Value Ref Range    Glucose (POC) 280 (H) 65 - 117 mg/dL    Performed by Linda Cifuentes (Trvlr)    GLUCOSE, POC    Collection Time: 03/04/22 11:50 PM   Result Value Ref Range    Glucose (POC) 302 (H) 65 - 117 mg/dL    Performed by Linda Cifuentes (Trvgonzalo)    GLUCOSE, POC    Collection Time: 03/05/22  3:54 AM   Result Value Ref Range    Glucose (POC) 262 (H) 65 - 117 mg/dL    Performed by Linda Cifuentes (Trvgonzalo)    GLUCOSE, POC    Collection Time: 03/05/22  7:20 AM   Result Value Ref Range    Glucose (POC) 229 (H) 65 - 117 mg/dL    Performed by Meghan Ye (Trvgonzalo)      Additional comments: MRI of the brain shows right paracentral lobule infarct    NEUROLOGICAL EXAM:  Appearance: The patient is well developed, much more awake. She responded by saying good morning and good in response to how she was doing. Mental Status:  She is more awake and alert. Cranial Nerves:   Intact visual fields. GUMARO, EOM's full, no nystagmus, no ptosis. Facial movement is asymmetric with moderate flattening of the right nasolabial fold. Motor:   Moves all extremities to command may be right side slightly weaker than the right. Reflexes:   Deep tendon reflexes not checked. Sensory:   Normal to touch, pinprick. Gait:   Cannot be checked. Tremor:   No tremor noted. Cerebellar:   Could not be checked. Neurovascular:  Normal heart sounds and regular rhythm     Assessment:   1. Acute infarction left paracentral lobule and region of the white matter of the left motor cortex: This is the cause of her new right-sided weakness. 2.  Lethargy/encephalopathy: She is better, still slow in her responses. She has diabetic neuropathy which causes neuropathic pain. She is supposed to be taking gabapentin which has been on hold due to sedation  3. Hypertension. 4.  Diabetes mellitus  5: Fevers: treatment for UTI  Plan:   1.  Gabapentin is on hold for now. Once she starts becoming more awake we'll start at 100 mg 3 times daily. 2.  Please give Ativan 1 mg only if she goes into a grand mal seizure lasting for more than 3 minutes. 3.  Her CT scan of the head without contrast showed a left FREDO distribution older looking infarct, but newer than the last MRI of a week ago. Patient probably will need JUS to further evaluate for possible embolic source. 4. Awaiting urology recs if and when anti plts can be re-started.    5. BP goal < 150/90    Thank you,    Signed:  Cottie Felty, MD  3/5/2022  11:46 AM

## 2022-03-05 NOTE — PROGRESS NOTES
Progress Note    Patient: Ananth Pierson MRN: [id number]  SSN: [id number]    YOB: 1945  Age: 78 y.o. Sex: female      Admit Date: 2/21/2022    LOS: 8 days     Subjective:     Patient spiked a temperature today  Opens eyes to verbal command  Objective:     Vitals:    03/05/22 0229 03/05/22 0414 03/05/22 0600 03/05/22 0715   BP:    (!) 168/67   Pulse:    75   Resp:    20   Temp: (!) 101.2 °F (38.4 °C) (!) 100.8 °F (38.2 °C) 100.4 °F (38 °C) 99.7 °F (37.6 °C)   SpO2:    99%   Weight:       Height:            Intake and Output:  Current Shift: No intake/output data recorded. Last three shifts: 03/03 1901 - 03/05 0700  In: 19820   Out: 5300 [Urine:5300]    Physical Exam:   General:   lethargy   Eyes:     Ears:  Normal TMs and external ear canals both ears. Nose: Nares normal. Septum midline. Mucosa normal. No drainage or sinus tenderness. Mouth/Throat: Lips, mucosa, and tongue normal. Teeth and gums normal.   Neck: Supple, symmetrical, trachea midline, no adenopathy, thyroid: no enlargment/tenderness/nodules, no carotid bruit and no JVD. Back:   Symmetric, no curvature. ROM normal. No CVA tenderness. Lungs:   Clear to auscultation bilaterally. Heart:  Regular rate and rhythm, S1, S2 normal, no murmur, click, rub or gallop. Abdomen:   Soft, non-tender. Bowel sounds normal. No masses,  No organomegaly. Extremities: Extremities normal, atraumatic, no cyanosis or edema. Pulses: 2+ and symmetric all extremities. Skin: Skin color, texture, turgor normal. No rashes or lesions   Lymph nodes:    Neurologic:  Lethargic       Lab/Data Review: All lab results for the last 24 hours reviewed.      Recent Results (from the past 24 hour(s))   GLUCOSE, POC    Collection Time: 03/04/22 11:37 AM   Result Value Ref Range    Glucose (POC) 289 (H) 65 - 117 mg/dL    Performed by Javi Amezquita, POC    Collection Time: 03/04/22  4:07 PM   Result Value Ref Range    Glucose (POC) 286 (H) 65 - 117 mg/dL    Performed by Meghan Ye (Trvlr)    GLUCOSE, POC    Collection Time: 03/04/22  8:20 PM   Result Value Ref Range    Glucose (POC) 280 (H) 65 - 117 mg/dL    Performed by Linda Cifuentes (Trvlr)    GLUCOSE, POC    Collection Time: 03/04/22 11:50 PM   Result Value Ref Range    Glucose (POC) 302 (H) 65 - 117 mg/dL    Performed by Linda Cifuentes (Trvlr)    GLUCOSE, POC    Collection Time: 03/05/22  3:54 AM   Result Value Ref Range    Glucose (POC) 262 (H) 65 - 117 mg/dL    Performed by Linda Cifuentes (Trvlr)    GLUCOSE, POC    Collection Time: 03/05/22  7:20 AM   Result Value Ref Range    Glucose (POC) 229 (H) 65 - 117 mg/dL    Performed by Meghan Ye (Trvlr)          Assessment:     Active Problems:    CVA (cerebral vascular accident) ([address] (2/21/2022)      Elevated troponin (2/21/2022)      Patient has multiorgan failure  Neurology following for multiple strokes with complications  Cardiology reconsult was given for consideration for JUS  Decubitus ulcer foot patient was seen by podiatrist  Shock liver liver enzymes improving  Diabetic neuropathy  Diabetic nephropathy stage IV  Possible non-STEMI  Acute kidney injury on chronic kidney failure  Metabolic encephalopathy  Sepsis  UTI infectious disease  following  Plan:   sepsis work-up with blood cultures, urine culture CBC  May need coverage for MRSA will defer to infectious disease  Signed By: Ede Romo MD     March 5, 2022

## 2022-03-05 NOTE — PROGRESS NOTES
Renal Daily Progress Note    Admit Date: 2/21/2022      Subjective:   She didn't respond initially   Slowly she became more responsive and remains non verbal due to stroke last week   With HX of multiple strokes in the past  Renal point of view she is stable. B.P is high   ? Does she need CBI ? Still   H/H dropped   No BMP drawn ?       Current Facility-Administered Medications   Medication Dose Route Frequency    linezolid (ZYVOX) tablet 600 mg  600 mg Oral Q12H    insulin glargine (LANTUS) injection 25 Units  25 Units SubCUTAneous QHS    acetaminophen (TYLENOL) solution 650 mg  650 mg Per G Tube Q4H PRN    gabapentin (NEURONTIN) capsule 300 mg  300 mg Oral BID    0.45% sodium chloride infusion  25 mL/hr IntraVENous CONTINUOUS    piperacillin-tazobactam (ZOSYN) 3.375 g in 0.9% sodium chloride (MBP/ADV) 100 mL MBP  3.375 g IntraVENous Q8H    0.9% sodium chloride infusion 250 mL  250 mL IntraVENous PRN    insulin lispro (HUMALOG) injection   SubCUTAneous Q4H    latanoprost (XALATAN) 0.005 % ophthalmic solution 1 Drop  1 Drop Right Eye QPM    0.9% sodium chloride infusion 250 mL  250 mL IntraVENous PRN    zinc oxide-white petrolatum 20-75 % topical paste   Topical BID    lidocaine (XYLOCAINE) 4 % (40 mg/mL) topical solution   Topical PRN    [Held by provider] rivaroxaban (XARELTO) tablet 2.5 mg  2.5 mg Oral BID WITH MEALS    [Held by provider] atorvastatin (LIPITOR) tablet 80 mg  80 mg Oral DAILY    labetaloL (NORMODYNE;TRANDATE) 20 mg/4 mL (5 mg/mL) injection 10 mg  10 mg IntraVENous Q6H PRN    sodium chloride (NS) flush 5-40 mL  5-40 mL IntraVENous PRN    brimonidine (ALPHAGAN) 0.2 % ophthalmic solution 1 Drop  1 Drop Both Eyes TID    ferrous sulfate tablet 325 mg  325 mg Oral BID    glucose chewable tablet 16 g  4 Tablet Oral PRN    glucagon (GLUCAGEN) injection 1 mg  1 mg IntraMUSCular PRN    dextrose 10% infusion 125-250 mL  125-250 mL IntraVENous PRN    metoprolol succinate (TOPROL-XL) XL tablet 50 mg  50 mg Oral DAILY    amLODIPine (NORVASC) tablet 10 mg  10 mg Oral DAILY        Review of Systems    ROS   Not obtainable    Objective:     Patient Vitals for the past 8 hrs:   BP Temp Pulse Resp SpO2   03/05/22 0715 (!) 168/67 99.7 °F (37.6 °C) 75 20 99 %   03/05/22 0600 -- 100.4 °F (38 °C) -- -- --     03/05 0701 - 03/05 1900  In: 8917   Out: 7933 [NCEOX:0068]  03/03 1901 - 03/05 0700  In: 27429   Out: 1600 [Urine:5300]    Physical Exam:   Physical Exam  Cardiovascular:      Rate and Rhythm: Regular rhythm. Pulmonary:      Breath sounds: Normal breath sounds. Abdominal:      General: Bowel sounds are normal.      Palpations: Abdomen is soft. Comments: PEG tube in place     Trace edema  She is nonverbal      ANKLE BRACHIAL INDEX   Final Result      CT HEAD WO CONT   Final Result   1. Evolving left FREDO territory ischemic infarct. No acute hemorrhage. 2.  Paranasal sinus disease. XR FOOT LT AP/LAT   Final Result      DUPLEX UPPER EXT VENOUS RIGHT   Final Result      US ABD COMP   Final Result   Gallbladder sludge without other acute abnormality. XR CHEST PORT   Final Result   The cardiomediastinal silhouette is appropriate for age, technique,   and lung expansion. Pulmonary vasculature is not congested. The lungs are   essentially clear. No effusion or pneumothorax is seen. CT HEAD WO CONT   Final Result   Negative for acute intracranial process. CT ABD PELV WO CONT   Final Result   Hemorrhage within the urinary bladder. Mild left   hydroureteronephrosis. XR SWALLOW FUNC VIDEO   Final Result   Penetration with thin consistency. No aspiration. CTA HEAD NECK W WO CONT   Final Result   Negative head and neck CTA. Please note that all carotid bifurcation stenoses are measured as per NASCET   criteria. CT CODE NEURO HEAD WO CONTRAST   Final Result   1. No acute large vessel intracranial ischemia, hemorrhage.  Called report to   patient's nurse Hazel Ochoa at 7:12 PM 2/22/2022.   2. Right basal ganglia lacunar infarct. 3. Periventricular microangiopathy. 4. Pansinusitis. See above. MRI BRAIN WO CONT   Final Result   Acute infarction left paracentral lobule and region of the white   matter of the left motor cortex. CT CODE NEURO HEAD WO CONTRAST   Final Result      1. No acute intracranial hemorrhage. 2. Left basal ganglia lacunar infarct, which is age indeterminate in the absence   of any prior outside studies, but favored to be chronic. 3. Moderate to severe paranasal sinus disease. Results called to Dr. Neda Hairston at 9:39 PM on 2/21/2022           Data Review   Recent Labs     03/05/22  1018 03/04/22  0650   WBC 13.3* 13.1*   HGB 7.1* 7.5*   HCT 23.4* 24.1*    286     Recent Labs     03/05/22  1018 03/04/22  0650 03/03/22  0400   NA  --  148* 148*   K  --  4.6 5.0   CL  --  118* 117*   CO2  --  27 26   GLU  --  242* 169*   BUN  --  34* 32*   CREA  --  1.73* 1.55*   CA  --  8.6 8.4*   ALB 1.7* 1.9* 1.7*   * 296* 431*   INR 1.4* 1.4* 1.3*     No components found for: GLPOC  No results for input(s): PH, PCO2, PO2, HCO3, FIO2 in the last 72 hours. Recent Labs     03/05/22  1018 03/04/22  0650 03/03/22  0400   INR 1.4* 1.4* 1.3*         Assessment:           Active Problems:    CVA (cerebral vascular accident) ([facility] [address] (2/21/2022)      Elevated troponin (2/21/2022)    Acute kidney injury improving. Creatinine is worse today at 1.7 mg. She is likely to be nonoliguric. Modest hypernatremia-repeat BMP TODAY     Elevated transaminases- improving -  GI following    Normocytic anemia-     Acute CVA    Diabetes mellitus with peripheral neuropathy    Hematuria on CBI as per urology    E. coli urinary tract infection    Modest hypophosphatemia - corrected. .    Hypertension. She has been started on amlodipine and metoprolol. Blood pressures are higher today. Plan:   Keep well-hydrated. On tube feeds with free water flushes.   Get BMP from the blood drawn today   Switch pt to labetalol if B.P remains high   With metoprolo 50 mg bid   Thank you for consult .

## 2022-03-05 NOTE — PROGRESS NOTES
Problem: Falls - Risk of  Goal: *Absence of Falls  Description: Document Mazin Melvin Fall Risk and appropriate interventions in the flowsheet.   Outcome: Progressing Towards Goal  Note: Fall Risk Interventions:       Mentation Interventions: Bed/chair exit alarm    Medication Interventions: Bed/chair exit alarm    Elimination Interventions: Call light in reach,Bed/chair exit alarm    History of Falls Interventions: Bed/chair exit alarm         Problem: Patient Education: Go to Patient Education Activity  Goal: Patient/Family Education  Outcome: Progressing Towards Goal     Problem: TIA/CVA Stroke: 0-24 hours  Goal: Off Pathway (Use only if patient is Off Pathway)  Outcome: Progressing Towards Goal  Goal: Activity/Safety  Outcome: Progressing Towards Goal  Goal: Consults, if ordered  Outcome: Progressing Towards Goal  Goal: Diagnostic Test/Procedures  Outcome: Progressing Towards Goal  Goal: Nutrition/Diet  Outcome: Progressing Towards Goal  Goal: Discharge Planning  Outcome: Progressing Towards Goal  Goal: Medications  Outcome: Progressing Towards Goal  Goal: Respiratory  Outcome: Progressing Towards Goal  Goal: Treatments/Interventions/Procedures  Outcome: Progressing Towards Goal  Goal: Minimize risk of bleeding post-thrombolytic infusion  Outcome: Progressing Towards Goal  Goal: Monitor for complications post-thrombolytic infusion  Outcome: Progressing Towards Goal  Goal: Psychosocial  Outcome: Progressing Towards Goal  Goal: *Hemodynamically stable  Outcome: Progressing Towards Goal  Goal: *Neurologically stable  Description: Absence of additional neurological deficits    Outcome: Progressing Towards Goal  Goal: *Verbalizes anxiety and depression are reduced or absent  Outcome: Progressing Towards Goal  Goal: *Absence of Signs of Aspiration on Current Diet  Outcome: Progressing Towards Goal  Goal: *Absence of deep venous thrombosis signs and symptoms(Stroke Metric)  Outcome: Progressing Towards Goal  Goal: *Stroke education started(Stroke Metric)  Outcome: Progressing Towards Goal     Problem: Pressure Injury - Risk of  Goal: *Prevention of pressure injury  Description: Document Shakir Scale and appropriate interventions in the flowsheet.   Outcome: Progressing Towards Goal  Note: Pressure Injury Interventions:  Sensory Interventions: Minimize linen layers    Moisture Interventions: Minimize layers    Activity Interventions: Pressure redistribution bed/mattress(bed type)    Mobility Interventions: Pressure redistribution bed/mattress (bed type)    Nutrition Interventions: Document food/fluid/supplement intake    Friction and Shear Interventions: Minimize layers

## 2022-03-06 NOTE — PROGRESS NOTES
Renal Daily Progress Note    Admit Date: 2/21/2022      Subjective:    patient's family is visiting   she is more alert today  One  of the daughter recognized me as her sisters doctor .   she told me that her mom was able to ambulate with cane at home after the stroke last year   With HX of multiple strokes in the past   I informed the daughter that the mom's renal function is better   BP continued to fluctuate   BP is running low    Current Facility-Administered Medications   Medication Dose Route Frequency    linezolid (ZYVOX) tablet 600 mg  600 mg Oral Q12H    metoprolol succinate (TOPROL-XL) XL tablet 50 mg  50 mg Oral BID    insulin glargine (LANTUS) injection 25 Units  25 Units SubCUTAneous QHS    acetaminophen (TYLENOL) solution 650 mg  650 mg Per G Tube Q4H PRN    gabapentin (NEURONTIN) capsule 300 mg  300 mg Oral BID    0.45% sodium chloride infusion  25 mL/hr IntraVENous CONTINUOUS    piperacillin-tazobactam (ZOSYN) 3.375 g in 0.9% sodium chloride (MBP/ADV) 100 mL MBP  3.375 g IntraVENous Q8H    0.9% sodium chloride infusion 250 mL  250 mL IntraVENous PRN    insulin lispro (HUMALOG) injection   SubCUTAneous Q4H    latanoprost (XALATAN) 0.005 % ophthalmic solution 1 Drop  1 Drop Right Eye QPM    0.9% sodium chloride infusion 250 mL  250 mL IntraVENous PRN    zinc oxide-white petrolatum 20-75 % topical paste   Topical BID    lidocaine (XYLOCAINE) 4 % (40 mg/mL) topical solution   Topical PRN    [Held by provider] rivaroxaban (XARELTO) tablet 2.5 mg  2.5 mg Oral BID WITH MEALS    [Held by provider] atorvastatin (LIPITOR) tablet 80 mg  80 mg Oral DAILY    labetaloL (NORMODYNE;TRANDATE) 20 mg/4 mL (5 mg/mL) injection 10 mg  10 mg IntraVENous Q6H PRN    sodium chloride (NS) flush 5-40 mL  5-40 mL IntraVENous PRN    brimonidine (ALPHAGAN) 0.2 % ophthalmic solution 1 Drop  1 Drop Both Eyes TID    ferrous sulfate tablet 325 mg  325 mg Oral BID    glucose chewable tablet 16 g  4 Tablet Oral PRN  glucagon (GLUCAGEN) injection 1 mg  1 mg IntraMUSCular PRN    dextrose 10% infusion 125-250 mL  125-250 mL IntraVENous PRN    amLODIPine (NORVASC) tablet 10 mg  10 mg Oral DAILY        Review of Systems    ROS   Not obtainable    Objective:     Patient Vitals for the past 8 hrs:   BP Temp Pulse Resp SpO2   03/06/22 1738 (!) 86/64 99.3 °F (37.4 °C) 74 20 99 %     03/06 0701 - 03/06 1900  In: 6000   Out: 500 [Urine:500]  03/04 1901 - 03/06 0700  In: 7675   Out: 9750 [Urine:9750]    Physical Exam:   Physical Exam  Cardiovascular:      Rate and Rhythm: Regular rhythm. Pulmonary:      Breath sounds: Normal breath sounds. Abdominal:      General: Bowel sounds are normal.      Palpations: Abdomen is soft. Comments: PEG tube in place     Trace edema  She is nonverbal      ANKLE BRACHIAL INDEX   Final Result      CT HEAD WO CONT   Final Result   1. Evolving left FREDO territory ischemic infarct. No acute hemorrhage. 2.  Paranasal sinus disease. XR FOOT LT AP/LAT   Final Result      DUPLEX UPPER EXT VENOUS RIGHT   Final Result      US ABD COMP   Final Result   Gallbladder sludge without other acute abnormality. XR CHEST PORT   Final Result   The cardiomediastinal silhouette is appropriate for age, technique,   and lung expansion. Pulmonary vasculature is not congested. The lungs are   essentially clear. No effusion or pneumothorax is seen. CT HEAD WO CONT   Final Result   Negative for acute intracranial process. CT ABD PELV WO CONT   Final Result   Hemorrhage within the urinary bladder. Mild left   hydroureteronephrosis. XR SWALLOW FUNC VIDEO   Final Result   Penetration with thin consistency. No aspiration. CTA HEAD NECK W WO CONT   Final Result   Negative head and neck CTA. Please note that all carotid bifurcation stenoses are measured as per NASCET   criteria. CT CODE NEURO HEAD WO CONTRAST   Final Result   1.  No acute large vessel intracranial ischemia, hemorrhage. Called report to   patient's nurse Arabella Hernandez at 7:12 PM 2/22/2022.   2. Right basal ganglia lacunar infarct. 3. Periventricular microangiopathy. 4. Pansinusitis. See above. MRI BRAIN WO CONT   Final Result   Acute infarction left paracentral lobule and region of the white   matter of the left motor cortex. CT CODE NEURO HEAD WO CONTRAST   Final Result      1. No acute intracranial hemorrhage. 2. Left basal ganglia lacunar infarct, which is age indeterminate in the absence   of any prior outside studies, but favored to be chronic. 3. Moderate to severe paranasal sinus disease. Results called to Dr. Nona Hdez at 9:39 PM on 2/21/2022      DUPLEX LOWER EXT ARTERY BILAT    (Results Pending)        Data Review   Recent Labs     03/06/22  0948 03/05/22  1018 03/04/22  0650   WBC 12.8* 13.3* 13.1*   HGB 7.9* 7.1* 7.5*   HCT 25.4* 23.4* 24.1*    293 286     Recent Labs     03/06/22  0948 03/05/22  1018 03/04/22  0650   * 151* 148*   K 4.8 4.8 4.6   * 120* 118*   CO2 25 26 27   * 245* 242*   BUN 29* 34* 34*   CREA 1.63* 1.70* 1.73*   CA 8.5 8.5 8.6   ALB 1.8* 1.7* 1.9*   * 197* 296*   INR 1.3* 1.4* 1.4*     No components found for: GLPOC  No results for input(s): PH, PCO2, PO2, HCO3, FIO2 in the last 72 hours. Recent Labs     03/06/22  0948 03/05/22  1018 03/04/22  0650   INR 1.3* 1.4* 1.4*         Assessment:           Active Problems:    CVA (cerebral vascular accident) ([facility] [address] (2/21/2022)      Elevated troponin (2/21/2022)    Acute kidney injury improving. Creatinine is worse today at 1.6 mg. She is likely to be nonoliguric.     Modest hypernatremia- slowly improved    Elevated transaminases- improving -  GI following    Normocytic anemia-  With slightly increased hemoglobin and hematocrit    Acute CVA- seen by Neurology    Diabetes mellitus with peripheral neuropathy    Hematuria on CBI as per urology    E. coli urinary tract infection    Modest hypophosphatemia - corrected. .    Hypertension. She has been started on amlodipine and metoprolol. Blood pressures are higher today. Plan:    continue present Rx as she is doing better renal point of view and electrolytes wise. will make amlodipine 5 mg p.o. twice daily   hold it for systolic BP less than 102   IV fluids normal saline 250 mL bolus x1 if BP remains low.

## 2022-03-06 NOTE — CONSULTS
Neurology Progress Note    Patient ID:  Bhargavi Child  [id number]  78 y.o.  1945    Subjective: patient opens eyes. Not talking or following commands       Patient is a 78year old woman with HTN, DM who had a stroke last week with right facial droop and right sided weakness worked up in [facility] who was brought in for worsening speech and left sided weakness per daughters at bedside. She is living at home and uses a walker to ambulate at baseline. She has had multiple strokes including left parasaggital stroke and a left Melany stroke after that. All antiplts and anticoagulating agents held due to hematuria. Hematuria occurred when she was on asa 81mg and xarelto 2.5 mg bid. atorvastatin also held due to rising liver function tests.      Current Facility-Administered Medications   Medication Dose Route Frequency    linezolid (ZYVOX) tablet 600 mg  600 mg Oral Q12H    metoprolol succinate (TOPROL-XL) XL tablet 50 mg  50 mg Oral BID    insulin glargine (LANTUS) injection 25 Units  25 Units SubCUTAneous QHS    acetaminophen (TYLENOL) solution 650 mg  650 mg Per G Tube Q4H PRN    gabapentin (NEURONTIN) capsule 300 mg  300 mg Oral BID    0.45% sodium chloride infusion  25 mL/hr IntraVENous CONTINUOUS    piperacillin-tazobactam (ZOSYN) 3.375 g in 0.9% sodium chloride (MBP/ADV) 100 mL MBP  3.375 g IntraVENous Q8H    0.9% sodium chloride infusion 250 mL  250 mL IntraVENous PRN    insulin lispro (HUMALOG) injection   SubCUTAneous Q4H    latanoprost (XALATAN) 0.005 % ophthalmic solution 1 Drop  1 Drop Right Eye QPM    0.9% sodium chloride infusion 250 mL  250 mL IntraVENous PRN    zinc oxide-white petrolatum 20-75 % topical paste   Topical BID    lidocaine (XYLOCAINE) 4 % (40 mg/mL) topical solution   Topical PRN    [Held by provider] rivaroxaban (XARELTO) tablet 2.5 mg  2.5 mg Oral BID WITH MEALS    [Held by provider] atorvastatin (LIPITOR) tablet 80 mg  80 mg Oral DAILY    labetaloL (NORMODYNE;TRANDATE) 20 mg/4 mL (5 mg/mL) injection 10 mg  10 mg IntraVENous Q6H PRN    sodium chloride (NS) flush 5-40 mL  5-40 mL IntraVENous PRN    brimonidine (ALPHAGAN) 0.2 % ophthalmic solution 1 Drop  1 Drop Both Eyes TID    ferrous sulfate tablet 325 mg  325 mg Oral BID    glucose chewable tablet 16 g  4 Tablet Oral PRN    glucagon (GLUCAGEN) injection 1 mg  1 mg IntraMUSCular PRN    dextrose 10% infusion 125-250 mL  125-250 mL IntraVENous PRN    amLODIPine (NORVASC) tablet 10 mg  10 mg Oral DAILY     Objective:     Patient Vitals for the past 8 hrs:   BP Temp Pulse Resp SpO2   03/06/22 0729 128/61 (!) 101.6 °F (38.7 °C) 82 20 99 %     03/06 0701 - 03/06 1900  In: 3000   Out: 200 [Urine:200]  03/04 1901 - 03/06 0700  In: 0131   Out: 9750 [Urine:9750]    Lab Review   Recent Results (from the past 24 hour(s))   GLUCOSE, POC    Collection Time: 03/05/22  3:33 PM   Result Value Ref Range    Glucose (POC) 269 (H) 65 - 117 mg/dL    Performed by Alejandro Robledo (Trvlr)    GLUCOSE, POC    Collection Time: 03/05/22  8:10 PM   Result Value Ref Range    Glucose (POC) 210 (H) 65 - 117 mg/dL    Performed by [address] [place] [address], POC    Collection Time: 03/06/22  1:11 AM   Result Value Ref Range    Glucose (POC) 261 (H) 65 - 117 mg/dL    Performed by [address] [place] [address], POC    Collection Time: 03/06/22  5:31 AM   Result Value Ref Range    Glucose (POC) 264 (H) 65 - 117 mg/dL    Performed by Kuldeep Moses    GLUCOSE, POC    Collection Time: 03/06/22  7:25 AM   Result Value Ref Range    Glucose (POC) 261 (H) 65 - 117 mg/dL    Performed by Alejandro Robledo (Tr[facility]r)    PROTHROMBIN TIME + INR    Collection Time: 03/06/22  9:48 AM   Result Value Ref Range    Prothrombin time 16.2 (H) 11.9 - 14.6 sec    INR 1.3 (H) 0.9 - 1.1     HEPATIC FUNCTION PANEL    Collection Time: 03/06/22  9:48 AM   Result Value Ref Range    Protein, total 6.9 6.4 - 8.2 g/dL    Albumin 1.8 (L) 3.5 - 5.0 g/dL    Globulin 5.1 (H) 2.0 - 4.0 g/dL A-G Ratio 0.4 (L) 1.1 - 2.2      Bilirubin, total 0.2 0.2 - 1.0 mg/dL    Bilirubin, direct 0.1 0.0 - 0.2 mg/dL    Alk. phosphatase 99 45 - 117 U/L    AST (SGOT) 26 15 - 37 U/L    ALT (SGPT) 150 (H) 12 - 78 U/L   CBC WITH AUTOMATED DIFF    Collection Time: 03/06/22  9:48 AM   Result Value Ref Range    WBC 12.8 (H) 3.6 - 11.0 K/uL    RBC 2.72 (L) 3.80 - 5.20 M/uL    HGB 7.9 (L) 11.5 - 16.0 g/dL    HCT 25.4 (L) 35.0 - 47.0 %    MCV 93.4 80.0 - 99.0 FL    MCH 29.0 26.0 - 34.0 PG    MCHC 31.1 30.0 - 36.5 g/dL    RDW 16.6 (H) 11.5 - 14.5 %    PLATELET 355 181 - 813 K/uL    MPV 12.0 8.9 - 12.9 FL    NRBC 0.2 (H) 0.0  WBC    ABSOLUTE NRBC 0.03 (H) 0.00 - 0.01 K/uL    NEUTROPHILS 75 32 - 75 %    LYMPHOCYTES 18 12 - 49 %    MONOCYTES 4 (L) 5 - 13 %    EOSINOPHILS 1 0 - 7 %    BASOPHILS 1 0 - 1 %    IMMATURE GRANULOCYTES 1 (H) 0 - 0.5 %    ABS. NEUTROPHILS 9.7 (H) 1.8 - 8.0 K/UL    ABS. LYMPHOCYTES 2.2 0.8 - 3.5 K/UL    ABS. MONOCYTES 0.6 0.0 - 1.0 K/UL    ABS. EOSINOPHILS 0.2 0.0 - 0.4 K/UL    ABS. BASOPHILS 0.1 0.0 - 0.1 K/UL    ABS. IMM. GRANS. 0.1 (H) 0.00 - 0.04 K/UL    DF AUTOMATED     METABOLIC PANEL, BASIC    Collection Time: 03/06/22  9:48 AM   Result Value Ref Range    Sodium 147 (H) 136 - 145 mmol/L    Potassium 4.8 3.5 - 5.1 mmol/L    Chloride 117 (H) 97 - 108 mmol/L    CO2 25 21 - 32 mmol/L    Anion gap 5 5 - 15 mmol/L    Glucose 289 (H) 65 - 100 mg/dL    BUN 29 (H) 6 - 20 mg/dL    Creatinine 1.63 (H) 0.55 - 1.02 mg/dL    BUN/Creatinine ratio 18 12 - 20      GFR est AA 37 (L) >60 ml/min/1.73m2    GFR est non-AA 31 (L) >60 ml/min/1.73m2    Calcium 8.5 8.5 - 10.1 mg/dL   GLUCOSE, POC    Collection Time: 03/06/22 10:50 AM   Result Value Ref Range    Glucose (POC) 270 (H) 65 - 117 mg/dL    Performed by Estee Recinos      Additional comments: MRI of the brain shows right paracentral lobule infarct, Ct head with left FREDO stroke  NEUROLOGICAL EXAM:  Appearance:   The patient is well developed, opens eyes, not following commands or talking   Mental Status:  She is more awake and alert. Cranial Nerves:   Intact visual fields. GUMARO, EOM's full, no nystagmus, no ptosis. Facial movement is asymmetric with moderate flattening of the right nasolabial fold. Motor:   Moves all extremities to command may be right side slightly weaker than the right. Reflexes:   Deep tendon reflexes not checked. Sensory:   Normal to touch, pinprick. Gait:   Cannot be checked. Tremor:   No tremor noted. Cerebellar:   Could not be checked. Neurovascular:  Normal heart sounds and regular rhythm     Assessment:   1. Acute infarction left paracentral lobule and region of the white matter of the left motor cortex: This is the cause of her new right-sided weakness. 2.  Lethargy/encephalopathy: She is better, still slow in her responses. She has diabetic neuropathy which causes neuropathic pain. She is supposed to be taking gabapentin which has been on hold due to sedation  3. Hypertension. 4.  Diabetes mellitus  5: Fevers: treatment for UTI  Plan:   1.  Gabapentin is on hold for now. Once she starts becoming more awake we'll start at 100 mg 3 times daily. 2.  Please give Ativan 1 mg only if she goes into a grand mal seizure lasting for more than 3 minutes. 3.  Her CT scan of the head without contrast showed a left FREDO distribution older looking infarct, but newer than the last MRI of a week ago. Patient probably will need JUS to further evaluate for possible embolic source. 4. Awaiting urology recs if and when anti plts can be re-started.    5. BP goal < 150/90    Thank you,    Signed:  Everton Villarreal MD  3/6/2022  11:46 AM

## 2022-03-06 NOTE — CONSULTS
VASCULAR CONSULTATION      REASON FOR CONSULT: PAD    REQUESTING PROVIDER: Vitaly Holman    CHIEF COMPLAINT: Left great toe gangrene, abnormal ERLINDA    HISTORY OF PRESENT ILLNESS:  Darci Allan is a 78y.o. year-old female with past medical history significant for hypertension, diabetes, neuropathy, PAD, and CVA who presented to [facility] with worsening speech and left sided weakness after recent stroke treated at [facility]. She's had a somewhat complicated stay, questionable seizure, hematuria requiring CBI, RICARDO. She was noted to have an ulcer on her left great toe, seen by podiatry-appears to be dry gangrene. ERLINDA done yesterday abnormal bilaterally. Patient seen in her room, opens eyes to voice but does not attempt to communicate with gestures, etc.    Records from hospital admission course thus far reviewed. INPATIENT MEDICATIONS:  Home medications reviewed.     Current Facility-Administered Medications:     linezolid (ZYVOX) tablet 600 mg, 600 mg, Oral, Q12H, Orestes Norman MD, 600 mg at 03/06/22 0904    metoprolol succinate (TOPROL-XL) XL tablet 50 mg, 50 mg, Oral, BID, Shay Huerta MD, 50 mg at 03/06/22 0904    insulin glargine (LANTUS) injection 25 Units, 25 Units, SubCUTAneous, QHS, Orestes Norman MD, 25 Units at 03/05/22 2118    acetaminophen (TYLENOL) solution 650 mg, 650 mg, Per G Tube, Q4H PRN, Bubba JACKSON MD, 650 mg at 03/06/22 0906    gabapentin (NEURONTIN) capsule 300 mg, 300 mg, Oral, BID, Kalpesh Lara MD, 300 mg at 03/06/22 0904    0.45% sodium chloride infusion, 25 mL/hr, IntraVENous, CONTINUOUS, Kalpesh Lara MD, Last Rate: 25 mL/hr at 03/03/22 1209, 25 mL/hr at 03/03/22 1209    piperacillin-tazobactam (ZOSYN) 3.375 g in 0.9% sodium chloride (MBP/ADV) 100 mL MBP, 3.375 g, IntraVENous, Q8H, Fred Barnes MD, Last Rate: 25 mL/hr at 03/06/22 0544, 3.375 g at 03/06/22 0544    0.9% sodium chloride infusion 250 mL, 250 mL, IntraVENous, PRN, Neelam Soto MD  [facility] insulin lispro (HUMALOG) injection, , SubCUTAneous, Q4H, Israel Soto MD, 9 Units at 03/06/22 0700    latanoprost (XALATAN) 0.005 % ophthalmic solution 1 Drop, 1 Drop, Right Eye, QPM, Israel Soto MD, 1 Drop at 03/05/22 1759    0.9% sodium chloride infusion 250 mL, 250 mL, IntraVENous, PRN, Radha Louise MD    zinc oxide-white petrolatum 20-75 % topical paste, , Topical, BID, Yanet Bruner MD, Given at 03/06/22 0900    lidocaine (XYLOCAINE) 4 % (40 mg/mL) topical solution, , Topical, PRN, Vinay JACKSON MD, Given at 02/26/22 2207    [Held by provider] rivaroxaban (Merary Silverio) tablet 2.5 mg, 2.5 mg, Oral, BID WITH MEALS, Yue Richards MD, 2.5 mg at 02/24/22 1720    [Held by provider] atorvastatin (LIPITOR) tablet 80 mg, 80 mg, Oral, DAILY, Yue Richards MD, 80 mg at 02/26/22 0900    labetaloL (NORMODYNE;TRANDATE) 20 mg/4 mL (5 mg/mL) injection 10 mg, 10 mg, IntraVENous, Q6H PRN, Yue Richards MD, 10 mg at 03/02/22 1224    sodium chloride (NS) flush 5-40 mL, 5-40 mL, IntraVENous, PRN, Orestes Norman MD    brimonidine (ALPHAGAN) 0.2 % ophthalmic solution 1 Drop, 1 Drop, Both Eyes, TID, Orestes Norman MD, 1 Drop at 03/06/22 0908    ferrous sulfate tablet 325 mg, 325 mg, Oral, BID, Orestes Norman MD, 325 mg at 03/06/22 6300    glucose chewable tablet 16 g, 4 Tablet, Oral, PRN, Vinay JACKSON MD    glucagon (GLUCAGEN) injection 1 mg, 1 mg, IntraMUSCular, PRN, Orestes Samuels MD    dextrose 10% infusion 125-250 mL, 125-250 mL, IntraVENous, PRN, Orestes Samuels MD    amLODIPine (NORVASC) tablet 10 mg, 10 mg, Oral, DAILY, Padmaja Hubbard MD, 10 mg at 03/06/22 8980     ALLERGIES:  Allergies reviewed with the patient,No Known Allergies . FAMILY HISTORY:  Family history reviewed. SOCIAL HISTORY:  Notable for tobacco use, no heavy alcohol or illicit drug use.       REVIEW OF SYSTEMS:  Complete review of systems performed, pertinents noted above, all other systems are negative. PHYSICAL EXAMINATION:   General:  Awake, in no acute distress  Cardiovascular:  Regular rate and rhythm  Respiratory:  Clear/diminished  Abdomen:  Soft  Extremities:  Warm, Left great toe black, gangrenous, Right foot with previous toe amputations, nonpalpable pulses    Visit Vitals  /61 (BP 1 Location: Left upper arm, BP Patient Position: At rest;Lying)   Pulse 82   Temp (!) 101.6 °F (38.7 °C)   Resp 20   Ht 5' 7.01\" (1.702 m)   Wt 82.2 kg (181 lb 3.5 oz)   SpO2 99%   BMI 28.38 kg/m²         Recent labs results and imaging reviewed. Discussed case with Dr. Melania Do and our impression and recommendations are as follows:    PAD:  Abnormal ERLINDA, left great toe dry gangrene. Podiatry following. Amputations noted on right foot as well. Will order arterial duplex to better assess lower extremity flow. Dr. Wendy Belcher to follow with additional recommendations tomorrow. Med management as able    Recent CVA, followed by neurology, cardiology has already seen    Thank you for involving us in the care of this patient. Please do not hesitate to call me or Dr. Melania Do if additional questions arise.     Krystal Schulz NP  3/6/2022

## 2022-03-06 NOTE — PROGRESS NOTES
Progress Note    Patient: Fransisco Fowler MRN: [id number]  SSN: [id number]    YOB: 1945  Age: 78 y.o. Sex: female      Admit Date: 2/21/2022    LOS: 9 days     Subjective:     78years old recurrent CVA, non-STEMI, acute renal failure, UTI, hematuria,    Objective:     Vitals:    03/05/22 0715 03/05/22 1618 03/05/22 2010 03/06/22 0729   BP: (!) 168/67 (!) 158/63 (!) 140/57 128/61   Pulse: 75 72 73 82   Resp: 20 18 20 20   Temp: 99.7 °F (37.6 °C) (!) 100.7 °F (38.2 °C) (!) 100.6 °F (38.1 °C) (!) 101.6 °F (38.7 °C)   SpO2: 99% 100% 99% 99%   Weight:       Height:            Intake and Output:  Current Shift: 03/06 0701 - 03/06 1900  In: 3000   Out: 200 [Urine:200]  Last three shifts: 03/04 1901 - 03/06 0700  In: 7735   Out: 9750 [Urine:9750]    Physical Exam:   General:   Patient is alert eyes are tracking   Eyes:     Ears:  Normal TMs and external ear canals both ears. Nose: Nares normal. Septum midline. Mucosa normal. No drainage or sinus tenderness. Mouth/Throat: Lips, mucosa, and tongue normal. Teeth and gums normal.   Neck: Supple, symmetrical, trachea midline, no adenopathy, thyroid: no enlargment/tenderness/nodules, no carotid bruit and no JVD. Back:   Symmetric, no curvature. ROM normal. No CVA tenderness. Lungs:   Clear to auscultation bilaterally. Heart:  Regular rate and rhythm, S1, S2 normal, no murmur, click, rub or gallop. Abdomen:   Soft, non-tender. Bowel sounds normal. No masses,  No organomegaly. Extremities: Extremities normal, atraumatic, no cyanosis or edema. Pulses: 2+ and symmetric all extremities. Skin: Skin color, texture, turgor normal. No rashes or lesions   Lymph nodes:  Bedbound  Diffuse weakness   Neurologic:        Lab/Data Review: All lab results for the last 24 hours reviewed.      Recent Results (from the past 24 hour(s))   GLUCOSE, POC    Collection Time: 03/05/22  3:33 PM   Result Value Ref Range    Glucose (POC) 269 (H) 65 - 117 mg/dL Performed by Hernan Farooq (Trvlr)    GLUCOSE, POC    Collection Time: 03/05/22  8:10 PM   Result Value Ref Range    Glucose (POC) 210 (H) 65 - 117 mg/dL    Performed by [address] [place] [address], POC    Collection Time: 03/06/22  1:11 AM   Result Value Ref Range    Glucose (POC) 261 (H) 65 - 117 mg/dL    Performed by Bushra Mauro    GLUCOSE, POC    Collection Time: 03/06/22  5:31 AM   Result Value Ref Range    Glucose (POC) 264 (H) 65 - 117 mg/dL    Performed by Bushra Mauro    GLUCOSE, POC    Collection Time: 03/06/22  7:25 AM   Result Value Ref Range    Glucose (POC) 261 (H) 65 - 117 mg/dL    Performed by Hernan Farooq (Trvlr)    PROTHROMBIN TIME + INR    Collection Time: 03/06/22  9:48 AM   Result Value Ref Range    Prothrombin time 16.2 (H) 11.9 - 14.6 sec    INR 1.3 (H) 0.9 - 1.1     HEPATIC FUNCTION PANEL    Collection Time: 03/06/22  9:48 AM   Result Value Ref Range    Protein, total 6.9 6.4 - 8.2 g/dL    Albumin 1.8 (L) 3.5 - 5.0 g/dL    Globulin 5.1 (H) 2.0 - 4.0 g/dL    A-G Ratio 0.4 (L) 1.1 - 2.2      Bilirubin, total 0.2 0.2 - 1.0 mg/dL    Bilirubin, direct 0.1 0.0 - 0.2 mg/dL    Alk. phosphatase 99 45 - 117 U/L    AST (SGOT) 26 15 - 37 U/L    ALT (SGPT) 150 (H) 12 - 78 U/L   CBC WITH AUTOMATED DIFF    Collection Time: 03/06/22  9:48 AM   Result Value Ref Range    WBC 12.8 (H) 3.6 - 11.0 K/uL    RBC 2.72 (L) 3.80 - 5.20 M/uL    HGB 7.9 (L) 11.5 - 16.0 g/dL    HCT 25.4 (L) 35.0 - 47.0 %    MCV 93.4 80.0 - 99.0 FL    MCH 29.0 26.0 - 34.0 PG    MCHC 31.1 30.0 - 36.5 g/dL    RDW 16.6 (H) 11.5 - 14.5 %    PLATELET 353 913 - 567 K/uL    MPV 12.0 8.9 - 12.9 FL    NRBC 0.2 (H) 0.0  WBC    ABSOLUTE NRBC 0.03 (H) 0.00 - 0.01 K/uL    NEUTROPHILS 75 32 - 75 %    LYMPHOCYTES 18 12 - 49 %    MONOCYTES 4 (L) 5 - 13 %    EOSINOPHILS 1 0 - 7 %    BASOPHILS 1 0 - 1 %    IMMATURE GRANULOCYTES 1 (H) 0 - 0.5 %    ABS. NEUTROPHILS 9.7 (H) 1.8 - 8.0 K/UL    ABS.  LYMPHOCYTES 2.2 0.8 - 3.5 K/UL ABS. MONOCYTES 0.6 0.0 - 1.0 K/UL    ABS. EOSINOPHILS 0.2 0.0 - 0.4 K/UL    ABS. BASOPHILS 0.1 0.0 - 0.1 K/UL    ABS. IMM. GRANS. 0.1 (H) 0.00 - 0.04 K/UL    DF AUTOMATED     METABOLIC PANEL, BASIC    Collection Time: 03/06/22  9:48 AM   Result Value Ref Range    Sodium 147 (H) 136 - 145 mmol/L    Potassium 4.8 3.5 - 5.1 mmol/L    Chloride 117 (H) 97 - 108 mmol/L    CO2 25 21 - 32 mmol/L    Anion gap 5 5 - 15 mmol/L    Glucose 289 (H) 65 - 100 mg/dL    BUN 29 (H) 6 - 20 mg/dL    Creatinine 1.63 (H) 0.55 - 1.02 mg/dL    BUN/Creatinine ratio 18 12 - 20      GFR est AA 37 (L) >60 ml/min/1.73m2    GFR est non-AA 31 (L) >60 ml/min/1.73m2    Calcium 8.5 8.5 - 10.1 mg/dL   GLUCOSE, POC    Collection Time: 03/06/22 10:50 AM   Result Value Ref Range    Glucose (POC) 270 (H) 65 - 117 mg/dL    Performed by Latia Even          Assessment:     Active Problems:    CVA (cerebral vascular accident) ([address] (2/21/2022)      Elevated troponin (2/21/2022)    Patient has multiple organ failure with improving kidney function. Cardiology following a non-STEMI on possible JUS  Neurology following for CVA defer resumption of anticoagulants to neurology and urology      Plan:     PVD with gangrene podiatry is following   to assist for placement  By the bedside family present.   Discussed in detail and updated family with all questions answered  Signed By: Josefa Lopez MD     March 6, 2022

## 2022-03-07 NOTE — PROGRESS NOTES
Patient seen by [facility] liaison Harman Weiner. [facility] is till following patient for acceptance; and insurance will require auth.            Bobby Barnett, MSW

## 2022-03-07 NOTE — PROGRESS NOTES
Progress Note    Patient: Deidre Alvarez MRN: [id number]  SSN: [id number]    YOB: 1945  Age: 78 y.o. Sex: female      Admit Date: 2/21/2022    LOS: 10 days     Subjective:     Patient was off the floor for testing this am, will attempt to examine at a later time. Patient is followed for abnormal ERLINDA. She has a past medical history of hypertension, diabetes, neuropathy, PAD, and CVA. Telemetry Review: Normal sinus rhythm; heart rate 80s, no ectopy. Review of Symptoms:   Review of Systems   Constitutional: Negative. Cardiovascular: Negative for chest pain, dyspnea on exertion, irregular heartbeat and palpitations. Respiratory: Negative for cough, shortness of breath and wheezing. Gastrointestinal: Negative for abdominal pain, constipation, nausea and vomiting. Neurological: Negative for light-headedness. Objective:     Vitals:    03/06/22 2230 03/07/22 0215 03/07/22 0337 03/07/22 0835   BP: (!) 149/65   (!) 149/57   Pulse: 77   82   Resp: 18   20   Temp: 98.1 °F (36.7 °C) (!) 100.9 °F (38.3 °C) 99 °F (37.2 °C) 97.8 °F (36.6 °C)   SpO2: 97%   96%   Weight:       Height:            Intake and Output:  Current Shift: No intake/output data recorded. Last three shifts: 03/05 1901 - 03/07 0700  In: 86752   Out: 57968 [Urine:83740]    Physical Exam: Deferred on 3/7/22      Lab/Data Review: All lab results for the last 24 hours reviewed.          Current Facility-Administered Medications:     amLODIPine (NORVASC) tablet 5 mg, 5 mg, Oral, BID, Kandy Berry MD    linezolid (ZYVOX) tablet 600 mg, 600 mg, Oral, Q12H, Orestes Norman MD, 600 mg at 03/06/22 2154    metoprolol succinate (TOPROL-XL) XL tablet 50 mg, 50 mg, Oral, BID, Kandy Berry MD, 50 mg at 03/06/22 2154    insulin glargine (LANTUS) injection 25 Units, 25 Units, SubCUTAneous, QHS, Marisol JACKSON MD, 25 Units at 03/06/22 2778    acetaminophen (TYLENOL) solution 650 mg, 650 mg, Per G Tube, Q4H PRN, Guerline Correia MD, 650 mg at 03/07/22 0222    gabapentin (NEURONTIN) capsule 300 mg, 300 mg, Oral, BID, Kalpesh Lara MD, 300 mg at 03/06/22 2154    0.45% sodium chloride infusion, 25 mL/hr, IntraVENous, CONTINUOUS, Kalpesh Lara MD, Last Rate: 25 mL/hr at 03/03/22 1209, 25 mL/hr at 03/03/22 1209    0.9% sodium chloride infusion 250 mL, 250 mL, IntraVENous, PRN, Shin Soto MD    insulin lispro (HUMALOG) injection, , SubCUTAneous, Q4H, Israel Soto MD, 9 Units at 03/07/22 0622    latanoprost (XALATAN) 0.005 % ophthalmic solution 1 Drop, 1 Drop, Right Eye, QPM, Israel Soto MD, 1 Drop at 03/06/22 1715    0.9% sodium chloride infusion 250 mL, 250 mL, IntraVENous, PRN, Dena Louise MD    zinc oxide-white petrolatum 20-75 % topical paste, , Topical, BID, Guerline Correia MD, Given at 03/06/22 2100    lidocaine (XYLOCAINE) 4 % (40 mg/mL) topical solution, , Topical, PRN, Guerline Correia MD, Given at 02/26/22 2207    [Held by provider] rivaroxaban (Jonetta Belt) tablet 2.5 mg, 2.5 mg, Oral, BID WITH MEALS, Loc Diaz MD, 2.5 mg at 02/24/22 1720    [Held by provider] atorvastatin (LIPITOR) tablet 80 mg, 80 mg, Oral, DAILY, Loc Diaz MD, 80 mg at 02/26/22 0900    labetaloL (NORMODYNE;TRANDATE) 20 mg/4 mL (5 mg/mL) injection 10 mg, 10 mg, IntraVENous, Q6H PRN, Loc Diaz MD, 10 mg at 03/02/22 1224    sodium chloride (NS) flush 5-40 mL, 5-40 mL, IntraVENous, PRN, Orestes Norman MD    brimonidine (ALPHAGAN) 0.2 % ophthalmic solution 1 Drop, 1 Drop, Both Eyes, TID, Guerline Correia MD, 1 Drop at 03/06/22 2200    ferrous sulfate tablet 325 mg, 325 mg, Oral, BID, Orestes Norman MD, 325 mg at 03/06/22 2154    glucose chewable tablet 16 g, 4 Tablet, Oral, PRN, Abe JACKSON MD    glucagon (GLUCAGEN) injection 1 mg, 1 mg, IntraMUSCular, PRN, Orestes Hussein MD    dextrose 10% infusion 125-250 mL, 125-250 mL, IntraVENous, PRN, Abe Davila I, MD      Assessment:     Active Problems:    CVA (cerebral vascular accident) ([address] (2/21/2022)      Elevated troponin (2/21/2022)        Plan:     Case discussed with Collaborating physician Dr. Janell Paula and our recommendations are as follows:     1. PAD:  Abnormal ERLINDA, left great toe dry gangrene. Podiatry following. Amputations noted on right foot as well. Arterial duplex pending. Med management as able     2. Recent CVA, followed by neurology, cardiology has already seen      Thank you for involving us in the care of this patient. Please do not hesitate to call if additional questions arise. If after hours please call [phone]. Signed By: Antonia Ochoa NP     March 7, 2022        Dr. Delmy An Cardiologist    [facility]  [address], [address] [facility]  [phone]

## 2022-03-07 NOTE — PROGRESS NOTES
Comprehensive Nutrition Assessment    Type and Reason for Visit: Reassess (interim)    Nutrition Recommendations/Plan:   Continue Glucerna 1.5 at 55ml/hr with  a 125ml flush q3h        +prosource 1packet /day via PEG tube   = 2040kcal,123 protein, 2000ml water   Monitor TF jayant, weights, skin and labs. Document I/O's, BMs, and GSV        Nutrition Assessment:  Pt with CVA with right side weakness. Pt cont to remain lethargic and unable to really follow commands. slp eval 3/1- recommend cont NPO, feedings via NGT. Pt with RICARDO- renal noted creat improving, pt with multisystem organ failure. PEG tube placement 3/2. Therapeutic feeding at goal rate and tolerating without difficulty. Meds: Zyvox, insulin, toprol, gabapentin,xarelto, Lipitor, iron. Labs:  H/H 7.9/26.0, Na 147, K 4.8, BUN 30, creat 1.65, glucose 270, GLUC -290. Malnutrition Assessment:  Malnutrition Status:  No malnutrition    Context:  Chronic illness         Estimated Daily Nutrient Needs:  Energy (kcal): 0987-2242 (25-30kcal/kg); Weight Used for Energy Requirements: Current  Protein (g): 123 (1.5g/kg) - wounds and RICARDO imrpoving- unsure if pt has CKD.; Weight Used for Protein Requirements: Current  Fluid (ml/day): 2500 (30ml/kg) - hypernatremia; Method Used for Fluid Requirements: ml/kg      Nutrition Related Findings:  Last BM 3/6, loose. No edema. No n/v/c/d. +dysphagia/Peg tube. .     Wounds:    Multiple (R great toe, buttocks stage 2, L heel dark area)           Current Nutrition Therapies:  DIET NPO  ADULT TUBE FEEDING PEG; Diabetic; Delivery Method: Continuous; Continuous Initial Rate (mL/hr): 55; Continuous Advance Tube Feeding: No; Water Flush Volume (mL): 125; Water Flush Frequency: Q 3 hours; Modulars/Additives: Protein; Specify How to Ad. .. Anthropometric Measures:  · Height:  5' 7.01\" (170.2 cm)  · Current Body Wt:  82.2 kg (181 lb 3.5 oz) (2/27)   · Ideal Body Wt:  135 lbs:  134.2 %   · BMI Category:   Overweight (BMI 25.0-29. 9)       Nutrition Diagnosis:   · Inadequate protein-energy intake related to cognitive or neurological impairment as evidenced by nutrition support-enteral nutrition,swallowing study results      Nutrition Interventions:   Food and/or Nutrient Delivery: Continue tube feeding  Nutrition Education and Counseling: No recommendations at this time  Coordination of Nutrition Care: Speech therapy,Continue to monitor while inpatient    Goals:  Meet >75% of ENNs. Weight maintainence, wound healing.        Nutrition Monitoring and Evaluation:   Behavioral-Environmental Outcomes: None identified  Food/Nutrient Intake Outcomes: Enteral nutrition intake/tolerance  Physical Signs/Symptoms Outcomes: Nutrition focused physical findings,Weight,Chewing or swallowing,Biochemical data    Discharge Planning:    Enteral nutrition     Electronically signed by Babatunde Sunshine RD on 3/7/2022 at 10:10 AM    Contact: [phone]

## 2022-03-07 NOTE — PROGRESS NOTES
Problem: Mobility Impaired (Adult and Pediatric)  Goal: *Acute Goals and Plan of Care (Insert Text)  Description: Revised 3/4/22  Pt will be I with LE HEP in 7 days. Pt will perform bed mobility with min Ax1 in 7 days. Pt will perform transfers with min Ax1 in 7 days. Pt will amb 5-10 feet with LRAD safely with min Ax1 in 7 days. Pt will demonstrate improvement in dynamic seated and standing balance with min A in 7 days. Outcome: Progressing Towards Goal   PHYSICAL THERAPY TREATMENT  Patient: Sherrell Oshea (10 y.o. female)  Date: 3/7/2022  Diagnosis: CVA (cerebral vascular accident) ([facility] [address] [I63.9]  Elevated troponin [R77.8] <principal problem not specified>  Procedure(s) (LRB):  PERCUTANEOUS ENDOSCOPIC GASTROSTOMY TUBE INSERTION (N/A)  ESOPHAGOGASTRODUODENOSCOPY (EGD) (N/A) 5 Days Post-Op  Precautions:    Chart, physical therapy assessment, plan of care and goals were reviewed. ASSESSMENT  Patient continues with skilled PT services and is progressing towards goals. Pt. Supine upon entering and agreeable to therapy. Pt alert and  Non verbal during session. Pt. Required max assist X 2 for bed mobility. Pt. Sat EOB approx 8 min and worked on static sitting balance as well as weight shifting and weight bearing through R UE. Pt.  Required max/total assist for sitting balance. Pt. Leaning posteriorly and to the R. Pt. Received PROM to bilat LEs within available ranges. Pt. Fatigues easily and requires rest breaks often. Pt. Demonstrates low tolerance for activity with sitting on the side of the bed. Current Level of Function Impacting Discharge (mobility/balance): decreased mobility/A X 2    Other factors to consider for discharge: activity tolerance/safety         PLAN :  Patient continues to benefit from skilled intervention to address the above impairments. Continue treatment per established plan of care. to address goals.     Recommendation for discharge: (in order for the patient to meet his/her long term goals)  Elie Jorge    This discharge recommendation:  Has been made in collaboration with the attending provider and/or case management    IF patient discharges home will need the following DME: SNF       SUBJECTIVE:   Patient non verbal during session. CO RX with TRIANA for increased mobility/safety. OBJECTIVE DATA SUMMARY:   Critical Behavior:  Neurologic State: Alert,Drowsy  Orientation Level: Unable to verbalize  Cognition: Decreased attention/concentration,Decreased command following  Safety/Judgement: Decreased insight into deficits  Functional Mobility Training:  Bed Mobility:  Rolling: Maximum assistance;Assist x2  Supine to Sit: Maximum assistance;Assist x2  Sit to Supine: Maximum assistance;Assist x2  Scooting: Maximum assistance;Assist x2     Rolling side to side with total assist/max A X 2. Pt. Had BM and needed to be cleaned up. Changed pt.s pad and gown. Transfers:                Balance:  Sitting: Impaired; With support  Sitting - Static: Poor (constant support)  Sitting - Dynamic: Poor (constant support)  Ambulation/Gait Training:            PROM to bilat LEs within available ranges 10 reps each. Pain Rating: Pt. Unable to state if in pain      Activity Tolerance:   Fair, requires frequent rest breaks, and observed SOB with activity  Please refer to the flowsheet for vital signs taken during this treatment. After treatment patient left in no apparent distress:   Supine in bed, Call bell within reach, Bed / chair alarm activated, Caregiver / family present, Side rails x 3, and RN notified of RX    COMMUNICATION/COLLABORATION:   The patients plan of care was discussed with: Occupational therapy assistant and Registered nurse.      Jose G Cook   Time Calculation: 47 mins

## 2022-03-07 NOTE — PROGRESS NOTES
Progress Note  Date:3/7/2022       Room:[facility]  Patient Victory Bence     YOB: 1945     Age:77 y.o. Subjective    Subjective   Pt seen at [facility]. No acute overnight events per nursing. Review of Systems   Unable to accurately obtain due to patient's current medical status    Objective         Vitals Last 24 Hours:  TEMPERATURE:  Temp  Av °F (37.2 °C)  Min: 97.8 °F (36.6 °C)  Max: 100.9 °F (38.3 °C)  RESPIRATIONS RANGE: Resp  Av.3  Min: 18  Max: 20  PULSE OXIMETRY RANGE: SpO2  Av.3 %  Min: 96 %  Max: 99 %  PULSE RANGE: Pulse  Av.5  Min: 74  Max: 82  BLOOD PRESSURE RANGE: Systolic (29YKL), BRH:327 , Min:86 , UCI:484   ; Diastolic (99COE), RYV:96, Min:57, Max:65    I/O (24Hr): Intake/Output Summary (Last 24 hours) at 3/7/2022 1300  Last data filed at 3/7/2022 0513  Gross per 24 hour   Intake 6125 ml   Output 69038 ml   Net -4475 ml     Objective   GEN: Pt is in NAD. No dressing noted to the B/L feet. Daughters noted at [facility]. Bunny boots noted to be in place  DERM: Dry gangrene noted to the left great toe. +PTB. No proximal lymphatic streaking noted  VASC: Pedal pulses (DP/PT) palpable to B/L LE. CFT<3sec to all digits of B/L LE. No pedal hair growth noted to the level of the digits for B/L LE. Skin temp is warm to warm from proximal to distal for B/L LE.   NEURO: Unable to accurately assess due to current medical status  MSK: No gross deformities.  Good muscle tone and bulk noted to B/L LE.  PSYCH: Sedated    Labs/Imaging/Diagnostics    Labs:  CBC:  Recent Labs     22  0738 22  0948 22  1018   WBC 12.8* 12.8* 13.3*   RBC 2.81* 2.72* 2.49*   HGB 7.9* 7.9* 7.1*   HCT 26.0* 25.4* 23.4*   MCV 92.5 93.4 94.0   RDW 16.2* 16.6* 16.8*    360 293     CHEMISTRIES:  Recent Labs     22  0738 22  0948 22  1018   * 147* 151*   K 4.8 4.8 4.8   * 117* 120*   CO2 24 25 26   BUN 30* 29* 34*   CA 8.4* 8.5 8.5   PT/INR:  Recent Labs     03/07/22  0738 03/06/22  0948 03/05/22  1018   INR 1.4* 1.3* 1.4*     APTT:No results for input(s): APTT in the last 72 hours. LIVER PROFILE:  Recent Labs     03/07/22  0738 03/06/22  0948 03/05/22  1018   AST 29 26 28   * 150* 197*     Lab Results   Component Value Date/Time    ALT (SGPT) 122 (H) 03/07/2022 07:38 AM    AST (SGOT) 29 03/07/2022 07:38 AM    Alk. phosphatase 94 03/07/2022 07:38 AM    Bilirubin, direct 0.1 03/07/2022 07:38 AM    Bilirubin, total 0.2 03/07/2022 07:38 AM       Imaging Last 24 Hours:  DUPLEX LOWER EXT ARTERY BILAT    Result Date: 3/7/2022  · Moderate to severe disease in bilateral SFA and popliteal arteries. · Occluded right posterior tibial artery · Occluded left peroneal artery      Assessment//Plan   Active Problems:    CVA (cerebral vascular accident) ([facility] [address] (2/21/2022)      Elevated troponin (2/21/2022)      Assessment & Plan    Dry Gangrene, Left Great Toe  Uncontrolled DM T2  PAD           Patient seen and evaluated at bedside in the ICU  - Current labs personally reviewed  - Non invasive vascular studies personally reviewed and discussed with interventional cardio. No intervention planned at this time. All parties agree that conservative management is base in this case  - Continue to leave open to air at this time no wound care needed  - Strict offloading for wound healing purposes and ulcer prevention  - Abx per ID  - Pt stable for DC from podiatry standpoint with outpatient f/u in my office  - I will follow closely while pt still in house           [place], [address] [facility], [address] [facility] and [place] [address] Surgery  [address] [place] [address] [address], [address] [facility], [address] [facility],[address]  Waylon Speaks:  [phone]  F:  [phone]  C:  [phone]    Electronically signed by Kandis Méndez DPM on 3/7/2022 at 1:00 PM

## 2022-03-07 NOTE — PROGRESS NOTES
Progress Note    Patient: Zion Stephens MRN: [id number]  SSN: [id number]    YOB: 1945  Age: 78 y.o. Sex: female      Admit Date: 2/21/2022    LOS: 10 days     Subjective:     78years old patient with multiorgan dysfunction. Objective:     Vitals:    03/07/22 0215 03/07/22 0337 03/07/22 0835 03/07/22 1725   BP:   (!) 149/57 (!) 151/61   Pulse:   82 80   Resp:   20 18   Temp: (!) 100.9 °F (38.3 °C) 99 °F (37.2 °C) 97.8 °F (36.6 °C) 98.1 °F (36.7 °C)   SpO2:   96% 96%   Weight:       Height:            Intake and Output:  Current Shift: No intake/output data recorded. Last three shifts: 03/05 1901 - 03/07 0700  In: 28658   Out: 42061 [Urine:94505]    Physical Exam:   General:  Alert, cooperative, no distress, appears stated age. Eyes:  Conjunctivae/corneas clear. PERRL, EOMs intact. Fundi benign   Ears:  Normal TMs and external ear canals both ears. Nose: Nares normal. Septum midline. Mucosa normal. No drainage or sinus tenderness. Mouth/Throat: Lips, mucosa, and tongue normal. Teeth and gums normal.   Neck: Supple, symmetrical, trachea midline, no adenopathy, thyroid: no enlargment/tenderness/nodules, no carotid bruit and no JVD. Back:   Symmetric, no curvature. ROM normal. No CVA tenderness. Lungs:   Clear to auscultation bilaterally. Heart:  Regular rate and rhythm, S1, S2 normal, no murmur, click, rub or gallop. Abdomen:   Soft, non-tender. Bowel sounds normal. No masses,  No organomegaly. Extremities: Extremities normal, atraumatic, no cyanosis or edema. Pulses: 2+ and symmetric all extremities. Skin: Skin color, texture, turgor normal. No rashes or lesions   Lymph nodes: Cervical, supraclavicular, and axillary nodes normal.   Neurologic:  Diffuse weakness. Lab/Data Review: All lab results for the last 24 hours reviewed.      Recent Results (from the past 24 hour(s))   GLUCOSE, POC    Collection Time: 03/06/22  8:34 PM   Result Value Ref Range    Glucose (POC) 266 (H) 65 - 117 mg/dL    Performed by Robert Blum, POC    Collection Time: 03/06/22 10:50 PM   Result Value Ref Range    Glucose (POC) 290 (H) 65 - 117 mg/dL    Performed by Moreen Krabbe    GLUCOSE, POC    Collection Time: 03/07/22  2:12 AM   Result Value Ref Range    Glucose (POC) 281 (H) 65 - 117 mg/dL    Performed by Moreen Krabbe    GLUCOSE, POC    Collection Time: 03/07/22  5:54 AM   Result Value Ref Range    Glucose (POC) 278 (H) 65 - 117 mg/dL    Performed by Moreen Krabbe    PROTHROMBIN TIME + INR    Collection Time: 03/07/22  7:38 AM   Result Value Ref Range    Prothrombin time 16.9 (H) 11.9 - 14.6 sec    INR 1.4 (H) 0.9 - 1.1     HEPATIC FUNCTION PANEL    Collection Time: 03/07/22  7:38 AM   Result Value Ref Range    Protein, total 6.5 6.4 - 8.2 g/dL    Albumin 1.8 (L) 3.5 - 5.0 g/dL    Globulin 4.7 (H) 2.0 - 4.0 g/dL    A-G Ratio 0.4 (L) 1.1 - 2.2      Bilirubin, total 0.2 0.2 - 1.0 mg/dL    Bilirubin, direct 0.1 0.0 - 0.2 mg/dL    Alk. phosphatase 94 45 - 117 U/L    AST (SGOT) 29 15 - 37 U/L    ALT (SGPT) 122 (H) 12 - 78 U/L   CBC WITH AUTOMATED DIFF    Collection Time: 03/07/22  7:38 AM   Result Value Ref Range    WBC 12.8 (H) 3.6 - 11.0 K/uL    RBC 2.81 (L) 3.80 - 5.20 M/uL    HGB 7.9 (L) 11.5 - 16.0 g/dL    HCT 26.0 (L) 35.0 - 47.0 %    MCV 92.5 80.0 - 99.0 FL    MCH 28.1 26.0 - 34.0 PG    MCHC 30.4 30.0 - 36.5 g/dL    RDW 16.2 (H) 11.5 - 14.5 %    PLATELET 779 933 - 756 K/uL    MPV 11.4 8.9 - 12.9 FL    NRBC 0.0 0.0  WBC    ABSOLUTE NRBC 0.00 0.00 - 0.01 K/uL    NEUTROPHILS 71 32 - 75 %    LYMPHOCYTES 21 12 - 49 %    MONOCYTES 5 5 - 13 %    EOSINOPHILS 1 0 - 7 %    BASOPHILS 1 0 - 1 %    IMMATURE GRANULOCYTES 1 (H) 0 - 0.5 %    ABS. NEUTROPHILS 9.3 (H) 1.8 - 8.0 K/UL    ABS. LYMPHOCYTES 2.7 0.8 - 3.5 K/UL    ABS. MONOCYTES 0.6 0.0 - 1.0 K/UL    ABS. EOSINOPHILS 0.1 0.0 - 0.4 K/UL    ABS. BASOPHILS 0.1 0.0 - 0.1 K/UL    ABS. IMM.  GRANS. 0.1 (H) 0.00 - 0.04 K/UL    DF AUTOMATED     METABOLIC PANEL, BASIC    Collection Time: 03/07/22  7:38 AM   Result Value Ref Range    Sodium 147 (H) 136 - 145 mmol/L    Potassium 4.8 3.5 - 5.1 mmol/L    Chloride 119 (H) 97 - 108 mmol/L    CO2 24 21 - 32 mmol/L    Anion gap 4 (L) 5 - 15 mmol/L    Glucose 270 (H) 65 - 100 mg/dL    BUN 30 (H) 6 - 20 mg/dL    Creatinine 1.65 (H) 0.55 - 1.02 mg/dL    BUN/Creatinine ratio 18 12 - 20      GFR est AA 37 (L) >60 ml/min/1.73m2    GFR est non-AA 30 (L) >60 ml/min/1.73m2    Calcium 8.4 (L) 8.5 - 10.1 mg/dL   DUPLEX LOWER EXT ARTERY BILAT    Collection Time: 03/07/22 11:17 AM   Result Value Ref Range    Left Dist DARREN Velocity 66.1 cm/s    Left popliteal dist sys .0 cm/s    Left popliteal prox sys .0 cm/s    Left Dist PTA PSV 43.0 cm/s    Left super femoral dist sys .0 cm/s    Left super femoral mid sys PSV 98.0 cm/s    Left super femoral prox sys .0 cm/s    Left Prox PFA A .0 cm/s    Right Dist DARREN Velocity 92.4 cm/s    Right popliteal dist sys .0 cm/s    Right popliteal prox sys .0 cm/s    Right Dist Peroneal Velocity 23.7 cm/s    Right super femoral dist sys .0 cm/s    Right super femoral mid sys .0 cm/s    Right super femoral prox sys .0 cm/s    Right Prox PFA A .0 cm/s    Right SFA Prox Bandar Ratio 1.2     Right SFA Mid Bandar Ratio 0.5     Right SFA Dist Bandar Ratio 1.11     Right Pop A Prox Bandar Ratio 2.58     Left SFA Prox Bandar Ratio 0.89     Left SFA Mid Bandar Ratio 0.59     Left SFA Dist Bandar Ratio 1.06     Left Pop A Prox Bandar Ratio 1.91     Left CFA dist sys .0 cm/s    Right CFA dist sys .0 cm/s   GLUCOSE, POC    Collection Time: 03/07/22 12:16 PM   Result Value Ref Range    Glucose (POC) 269 (H) 65 - 117 mg/dL    Performed by Saturnino POC    Collection Time: 03/07/22  3:43 PM   Result Value Ref Range    Glucose (POC) 292 (H) 65 - 117 mg/dL    Performed by Isabel Zhong          Assessment:     Active Problems:    CVA (cerebral vascular accident) ([facility] [address] (2/21/2022)      Elevated troponin (2/21/2022)    As above    Plan:     temperature elevated trending now  I discussed with the family at length yesterday    Signed By: Darci Allan MD     March 7, 2022

## 2022-03-07 NOTE — PROGRESS NOTES
OCCUPATIONAL THERAPY TREATMENT  Patient: Lisa Costa (27 y.o. female)  Date: 3/7/2022  Diagnosis: CVA (cerebral vascular accident) ([facility] [address] [I63.9]  Elevated troponin [R77.8] <principal problem not specified>  Procedure(s) (LRB):  PERCUTANEOUS ENDOSCOPIC GASTROSTOMY TUBE INSERTION (N/A)  ESOPHAGOGASTRODUODENOSCOPY (EGD) (N/A) 5 Days Post-Op  Precautions:    Chart, occupational therapy assessment, plan of care, and goals were reviewed. ASSESSMENT  Patient continues with skilled OT services and is progressing towards goals. Upon TRIANA/PTA arrival, pt semi supine in bed with granddaughter in room and agreeable to tx session. Pt completed bed mobility with MaxA x2 this date. Pt able to demonstrate minimal initiation throughout session and required max cueing throughout. semi supine UE therex completed (see chart below). Pt completed supine>sit to EOB and able to sit for ~8 minutes. Pt completed seated EOB face washing with MaxA requiring tactile cueing throughout. Pt completed seated EOB trunk control activity and practiced weight shifting and weight bearing throughout UE with constant support for balance. Balance assistance at EOB remained MaxA. Pt returned to supine with MaxA x2 and scooted to EOB. Pt noted to be soiled in BM, bowel hygiene completed with total A.  RN entered to change Mepilex on pt buttocks due to being soiled in BM. Pt completed rolling L and R for dara pad change. Donning of clean gown completed with MaxA completed in supine. Pt left semi supine in bed with call bell within reach and granddaughter in room. Will continue to follow pt throughout remainder of stay and progress towards OT goals. Recommending SNF at discharge when medically appropriate.     Current Level of Function Impacting Discharge (ADLs): MaxA x2 bed mobility, MaxA grooming, total A toileting, MaxA UB dressing    Other factors to consider for discharge: home support, PLOF, severity of deficits         PLAN :  Patient continues to benefit from skilled intervention to address the above impairments. Continue treatment per established plan of care. to address goals. Recommendation for discharge: (in order for the patient to meet his/her long term goals)  Elie Patel    This discharge recommendation:  Has been made in collaboration with the attending provider and/or case management    IF patient discharges home will need the following DME: TBD       SUBJECTIVE:   Patient stated bye bye at end of session. OBJECTIVE DATA SUMMARY:   Cognitive/Behavioral Status:  Neurologic State: Alert;Drowsy  Orientation Level: Unable to verbalize  Cognition: Decreased attention/concentration;Decreased command following    Functional Mobility and Transfers for ADLs:  Bed Mobility:  Rolling: Maximum assistance;Assist x2  Supine to Sit: Maximum assistance;Assist x2  Sit to Supine: Maximum assistance;Assist x2  Scooting: Maximum assistance;Assist x2    Balance:  Sitting: Impaired; With support  Sitting - Static: Poor (constant support)  Sitting - Dynamic: Poor (constant support)    ADL Intervention:  Grooming  Position Performed: Seated edge of bed  Washing Face: Maximum assistance    Upper Body [address]: Maximum assistance    Toileting  Bladder Hygiene: Total assistance (dependent)  Bowel Hygiene: Total assistance (dependent)    Therapeutic Exercises:   Exercise Sets Reps AROM AAROM PROM Self PROM Comments   Shoulder flex/ext 1 10 [] [x] [x] []    Elbow flex/ext 1 10 [] [x] [x] []      Pain:  0/10    Activity Tolerance:   Fair and requires frequent rest breaks  Please refer to the flowsheet for vital signs taken during this treatment. After treatment patient left in no apparent distress:   Supine in bed, Call bell within reach, and Caregiver / family present    COMMUNICATION/COLLABORATION:   The patients plan of care was discussed with: Physical therapy assistant and Registered nurse. [place] with PT for increased safety for pt and clinician. KONG Yanes  Time Calculation: 47 mins    Problem: Self Care Deficits Care Plan (Adult)  Goal: *Acute Goals and Plan of Care (Insert Text)  Description: 1. Pt will be min A sup <> sit in prep for EOB ADLs  2. Pt will be mod I grooming sitting EOB  3. Pt will be min A sit <> stand/stand pivot transfer in prep for toileting LRAD  4. Pt will be min A toileting/toilet transfer/cloth mgmt LRAD  6.  Pt will be mod I following UE HEP in prep for self care tasks      Outcome: Progressing Towards Goal

## 2022-03-07 NOTE — PROGRESS NOTES
Infectious Disease Progress Note           Subjective:   Stable, more responsive, daughter at bedside. No acute events since last seen, Tmax of 101.6F   Objective:   Physical Exam:     Visit Vitals  BP (!) 149/57 (BP 1 Location: Left upper arm, BP Patient Position: At rest)   Pulse 82   Temp 97.8 °F (36.6 °C)   Resp 20   Ht 5' 7.01\" (1.702 m)   Wt 181 lb 3.5 oz (82.2 kg)   SpO2 96%   BMI 28.38 kg/m²    O2 Flow Rate (L/min): 1 l/min (placed on room air) O2 Device: None (Room air)    Temp (24hrs), Av °F (37.2 °C), Min:97.8 °F (36.6 °C), Max:100.9 °F (38.3 °C)    No intake/output data recorded.     1901 -  0700  In: 34284   Out: 50121 [Urine:32633]    General: NAD, alert, non-verbal, not following commands   HEENT: GUMARO, very dry mouth, dry mucous membranes   Lungs: CTA b/l, decreased at the bases, no wheeze/rhonchi   Heart: S1S2+, RRR, no murmur  Abdo: Soft, NT, ND, +BS   :+ indwelling giraldo cath    Exts: Ischemic changes involving left great toe   Skin: No pressure ulcer      Data Review:       Recent Days:  Recent Labs     22  0738 22  0948 22  1018   WBC 12.8* 12.8* 13.3*   HGB 7.9* 7.9* 7.1*   HCT 26.0* 25.4* 23.4*    360 293     Recent Labs     22  0738 22  0948 22  1018   BUN 30* 29* 34*   CREA 1.65* 1.63* 1.70*       Lab Results   Component Value Date/Time    C-Reactive protein 8.61 (H) 2022 04:00 AM        Microbiology     Results     Procedure Component Value Units Date/Time    CULTURE, BLOOD [[id number]] Collected: 22 1016    Order Status: Completed Specimen: Blood Updated: 22 1415     Special Requests: --        No Special Requests  Left  Forearm       Culture result: No growth 2 days       CULTURE, WOUND [facility] STAIN [[id number]]  (Susceptibility) Collected: 22    Order Status: Completed Specimen: Wound Updated: 22 1000     Special Requests: No Special Requests        GRAM STAIN 2+ Gram Negative Rods         2+ Gram Positive Cocci        Culture result: Heavy Escherichia coli         Moderate  Mixed skin yasmine isolated       Susceptibility      Escherichia coli     GEMA     Amikacin ($) Susceptible     Ampicillin ($) Resistant     Ampicillin/sulbactam ($) Resistant     Cefazolin ($) Resistant     Cefepime ($$) Susceptible     Cefoxitin Susceptible     Ceftazidime ($) Susceptible     Ceftriaxone ($) Susceptible     Ciprofloxacin ($) Susceptible     Gentamicin ($) Resistant     Levofloxacin ($) Susceptible     Meropenem ($$) Susceptible     Piperacillin/Tazobac ($) Susceptible     Tobramycin ($) Intermediate     Trimeth/Sulfa Resistant                  Linear View                   CULTURE, BLOOD [[id number]] Collected: 02/25/22 1230    Order Status: Completed Specimen: Blood Updated: 03/03/22 0831     Special Requests: No Special Requests        Culture result: No growth 6 days       CULTURE, URINE [[id number]]  (Abnormal)  (Susceptibility) Collected: 02/23/22 1100    Order Status: Completed Specimen: Urine Updated: 02/26/22 1133     Special Requests: No Special Requests        [place] Count --        >100,000  colonies/ml       Culture result: Escherichia coli       Susceptibility      Escherichia coli     GEMA     Amikacin ($) Susceptible     Ampicillin ($) Resistant     Ampicillin/sulbactam ($) Resistant     Cefazolin ($) Susceptible     Cefepime ($$) Susceptible     Cefoxitin Susceptible     Ceftazidime ($) Susceptible     Ceftriaxone ($) Susceptible     Ciprofloxacin ($) Susceptible     Gentamicin ($) Resistant     Levofloxacin ($) Susceptible     Meropenem ($$) Susceptible     Nitrofurantoin Susceptible     Piperacillin/Tazobac ($) Susceptible     Tobramycin ($) Intermediate     Trimeth/Sulfa Resistant                  Linear View                            Diagnostics   CXR Results  (Last 48 hours)    None             Assessment/Plan     1.   UTI complicated by hematuria, suspect underlying neurogenic bladder from recent CVA       E. coli isolated from urine Cx on 2/23, blood Cx from 2/25 staying negative        Intermittently febrile,  WBC stable at 12.8. On Day # 8 of Zosyn       No obvious new source of infection, CBC, CRP, Procal and DD w morning labs       2. Positive hep C serology, Qualitative RNA neg, likely a false positive serology       Fmly informed of hep C results      3. Recently CVA, w right sided weakness, remains aphasic      Repeat CT head on 03/03 revealed evolving left FREDO territory ischemic infarct. No acute hemorrhage     4. Acute blood loss anemia from hematuria, Hgb staying stable after PRBC transfusion      5. Ischemic left great toe/chronic ulcer/gangrenous changes today       Known h/p PAD, w/p stent placement per daughter at bedside      6. Acute renal failure: Cr staying stable     7.  Dry mouth, mouth breathing, Orajel ordered, needs mouth care Q-shift      Amaury Denson MD    3/7/2022

## 2022-03-07 NOTE — PROGRESS NOTES
Renal Daily Progress Note    Admit Date: 2/21/2022      Subjective:     Seen and examined.   Opened her eyes but was not conversing  Clinical events noted  Current Facility-Administered Medications   Medication Dose Route Frequency    benzocaine-zinc cl-benzalkonium cl (ORAJEL) 20-0.1-0.02 % mucosal gel   Oral BID    amLODIPine (NORVASC) tablet 5 mg  5 mg Oral BID    linezolid (ZYVOX) tablet 600 mg  600 mg Oral Q12H    metoprolol succinate (TOPROL-XL) XL tablet 50 mg  50 mg Oral BID    insulin glargine (LANTUS) injection 25 Units  25 Units SubCUTAneous QHS    acetaminophen (TYLENOL) solution 650 mg  650 mg Per G Tube Q4H PRN    gabapentin (NEURONTIN) capsule 300 mg  300 mg Oral BID    0.45% sodium chloride infusion  25 mL/hr IntraVENous CONTINUOUS    0.9% sodium chloride infusion 250 mL  250 mL IntraVENous PRN    insulin lispro (HUMALOG) injection   SubCUTAneous Q4H    latanoprost (XALATAN) 0.005 % ophthalmic solution 1 Drop  1 Drop Right Eye QPM    0.9% sodium chloride infusion 250 mL  250 mL IntraVENous PRN    zinc oxide-white petrolatum 20-75 % topical paste   Topical BID    lidocaine (XYLOCAINE) 4 % (40 mg/mL) topical solution   Topical PRN    [Held by provider] rivaroxaban (XARELTO) tablet 2.5 mg  2.5 mg Oral BID WITH MEALS    [Held by provider] atorvastatin (LIPITOR) tablet 80 mg  80 mg Oral DAILY    labetaloL (NORMODYNE;TRANDATE) 20 mg/4 mL (5 mg/mL) injection 10 mg  10 mg IntraVENous Q6H PRN    sodium chloride (NS) flush 5-40 mL  5-40 mL IntraVENous PRN    brimonidine (ALPHAGAN) 0.2 % ophthalmic solution 1 Drop  1 Drop Both Eyes TID    ferrous sulfate tablet 325 mg  325 mg Oral BID    glucose chewable tablet 16 g  4 Tablet Oral PRN    glucagon (GLUCAGEN) injection 1 mg  1 mg IntraMUSCular PRN    dextrose 10% infusion 125-250 mL  125-250 mL IntraVENous PRN        Review of Systems    Review of Systems   Unable to perform ROS: Medical condition          Objective:     Patient Vitals for the past 8 hrs:   BP Temp Pulse Resp SpO2   03/07/22 0835 (!) 149/57 97.8 °F (36.6 °C) 82 20 96 %     No intake/output data recorded. 03/05 1901 - 03/07 0700  In: 63160   Out: 31304 [Urine:30768]    Physical Exam:   Physical Exam  Cardiovascular:      Rate and Rhythm: Regular rhythm. Pulmonary:      Breath sounds: Normal breath sounds. Abdominal:      General: Bowel sounds are normal.      Palpations: Abdomen is soft. Comments: PEG tube in place     Trace edema  She is nonverbal      DUPLEX LOWER EXT ARTERY BILAT   Final Result      ANKLE BRACHIAL INDEX   Final Result      CT HEAD WO CONT   Final Result   1. Evolving left FREDO territory ischemic infarct. No acute hemorrhage. 2.  Paranasal sinus disease. XR FOOT LT AP/LAT   Final Result      DUPLEX UPPER EXT VENOUS RIGHT   Final Result      US ABD COMP   Final Result   Gallbladder sludge without other acute abnormality. XR CHEST PORT   Final Result   The cardiomediastinal silhouette is appropriate for age, technique,   and lung expansion. Pulmonary vasculature is not congested. The lungs are   essentially clear. No effusion or pneumothorax is seen. CT HEAD WO CONT   Final Result   Negative for acute intracranial process. CT ABD PELV WO CONT   Final Result   Hemorrhage within the urinary bladder. Mild left   hydroureteronephrosis. XR SWALLOW FUNC VIDEO   Final Result   Penetration with thin consistency. No aspiration. CTA HEAD NECK W WO CONT   Final Result   Negative head and neck CTA. Please note that all carotid bifurcation stenoses are measured as per NASCET   criteria. CT CODE NEURO HEAD WO CONTRAST   Final Result   1. No acute large vessel intracranial ischemia, hemorrhage. Called report to   patient's nurse [place] at 7:12 PM 2/22/2022.   2. Right basal ganglia lacunar infarct. 3. Periventricular microangiopathy. 4. Pansinusitis. See above.       MRI BRAIN WO CONT   Final Result   Acute infarction left paracentral lobule and region of the white   matter of the left motor cortex. CT CODE NEURO HEAD WO CONTRAST   Final Result      1. No acute intracranial hemorrhage. 2. Left basal ganglia lacunar infarct, which is age indeterminate in the absence   of any prior outside studies, but favored to be chronic. 3. Moderate to severe paranasal sinus disease. Results called to Dr. Aubree Cochran at 9:39 PM on 2/21/2022           Data Review   Recent Labs     03/07/22  0738 03/06/22  0948 03/05/22  1018   WBC 12.8* 12.8* 13.3*   HGB 7.9* 7.9* 7.1*   HCT 26.0* 25.4* 23.4*    360 293     Recent Labs     03/07/22  0738 03/06/22  0948 03/05/22  1018   * 147* 151*   K 4.8 4.8 4.8   * 117* 120*   CO2 24 25 26   * 289* 245*   BUN 30* 29* 34*   CREA 1.65* 1.63* 1.70*   CA 8.4* 8.5 8.5   ALB 1.8* 1.8* 1.7*   * 150* 197*   INR 1.4* 1.3* 1.4*     No components found for: GLPOC  No results for input(s): PH, PCO2, PO2, HCO3, FIO2 in the last 72 hours. Recent Labs     03/07/22 0738 03/06/22  0948 03/05/22  1018   INR 1.4* 1.3* 1.4*         Assessment:           Active Problems:    CVA (cerebral vascular accident) ([facility] [address] (2/21/2022)      Elevated troponin (2/21/2022)      1. Acute kidney injury,-resolved  2. CKD  3. CVA-neuro  4. Hematuria on CBI  5. Hypertension  6. Severe malnutrition  7. History of hypertension  8. UTI E. Coli  9. Hyperglycemia  10. Hypernatremia          Plan:             Urine output data is not available. Sodium corrected to 151 after adjusting for serum glucose. Reach out to urology about CBI-if that can be stopped now that patient has no hematuria  May need to give hypotonic fluids.   We will continue to follow    Thank you

## 2022-03-08 NOTE — PROGRESS NOTES
Infectious Disease Progress Note           Subjective:   Stable,  Afebrile today, no change in clinical status, remains stable   Objective:   Physical Exam:     Visit Vitals  BP (!) 180/63 (BP Patient Position: Lying)   Pulse 90   Temp 98.5 °F (36.9 °C)   Resp 18   Ht 5' 7.01\" (1.702 m)   Wt 181 lb 3.5 oz (82.2 kg)   SpO2 100%   BMI 28.38 kg/m²    O2 Flow Rate (L/min): 1 l/min (placed on room air) O2 Device: None (Room air)    Temp (24hrs), Av.5 °F (36.9 °C), Min:98.1 °F (36.7 °C), Max:98.9 °F (37.2 °C)    No intake/output data recorded.     1901 -  0700  In: 48320   Out: 53387 [Urine:31086]    General: NAD, alert, non-verbal, not following commands   HEENT: GUMAOR, very dry mouth, dry mucous membranes   Lungs: CTA b/l, decreased at the bases, no wheeze/rhonchi   Heart: S1S2+, RRR, no murmur  Abdo: Soft, NT, ND, +BS   :+ indwelling giraldo cath    Exts: Ischemic changes involving left great toe   Skin: No pressure ulcer      Data Review:       Recent Days:  Recent Labs     22  0740 22  0738 22  0948   WBC 11.8* 12.8* 12.8*   HGB 7.7* 7.9* 7.9*   HCT 24.5* 26.0* 25.4*   * 398 360     Recent Labs     22  0740 22  0738 22  0948   BUN 32* 30* 29*   CREA 1.43* 1.65* 1.63*       Lab Results   Component Value Date/Time    C-Reactive protein 8.61 (H) 2022 04:00 AM        Microbiology     Results     Procedure Component Value Units Date/Time    CULTURE, BLOOD [[id number]] Collected: 22 1016    Order Status: Completed Specimen: Blood Updated: 22 1006     Special Requests: --        No Special Requests  Left  Forearm       Culture result: No growth 3 days       CULTURE, WOUND Counts include [facility] beds at the [facility] STAIN [[id number]]  (Susceptibility) Collected: 22    Order Status: Completed Specimen: Wound Updated: 22 1000     Special Requests: No Special Requests        GRAM STAIN 2+ Gram Negative Rods         2+ Gram Positive Cocci        Culture result: Heavy Escherichia coli         Moderate  Mixed skin yasmine isolated       Susceptibility      Escherichia coli     GEMA     Amikacin ($) Susceptible     Ampicillin ($) Resistant     Ampicillin/sulbactam ($) Resistant     Cefazolin ($) Resistant     Cefepime ($$) Susceptible     Cefoxitin Susceptible     Ceftazidime ($) Susceptible     Ceftriaxone ($) Susceptible     Ciprofloxacin ($) Susceptible     Gentamicin ($) Resistant     Levofloxacin ($) Susceptible     Meropenem ($$) Susceptible     Piperacillin/Tazobac ($) Susceptible     Tobramycin ($) Intermediate     Trimeth/Sulfa Resistant                  Linear View                   CULTURE, BLOOD [[id number]] Collected: 02/25/22 1230    Order Status: Completed Specimen: Blood Updated: 03/03/22 0831     Special Requests: No Special Requests        Culture result: No growth 6 days       CULTURE, URINE [[id number]]  (Abnormal)  (Susceptibility) Collected: 02/23/22 1100    Order Status: Completed Specimen: Urine Updated: 02/26/22 1133     Special Requests: No Special Requests        [place] Count --        >100,000  colonies/ml       Culture result: Escherichia coli       Susceptibility      Escherichia coli     GEMA     Amikacin ($) Susceptible     Ampicillin ($) Resistant     Ampicillin/sulbactam ($) Resistant     Cefazolin ($) Susceptible     Cefepime ($$) Susceptible     Cefoxitin Susceptible     Ceftazidime ($) Susceptible     Ceftriaxone ($) Susceptible     Ciprofloxacin ($) Susceptible     Gentamicin ($) Resistant     Levofloxacin ($) Susceptible     Meropenem ($$) Susceptible     Nitrofurantoin Susceptible     Piperacillin/Tazobac ($) Susceptible     Tobramycin ($) Intermediate     Trimeth/Sulfa Resistant                  Linear View                            Diagnostics   CXR Results  (Last 48 hours)    None             Assessment/Plan     1.   UTI complicated by hematuria, suspect underlying neurogenic bladder from recent CVA       E. coli isolated from urine Cx on 2/23, blood Cx from 2/25 staying negative        Hematuria resolved, clear urine in giraldo bag and tubing        Afebrile, leukocytosis trending down on todays labs        Zosyn resumed, routine labs in the morning       2. Positive hep C serology, Qualitative RNA neg, likely a false positive serology      3. Recently CVA, w right sided weakness, remains aphasic but more alert and attempting to communicate    4. PAD w Ischemic left great toe/chronic ulcer/dry gangrenous changes on exam       Continue on empiric Zosyn as above. Linezolid discontinued since no isolation of MRSA or VRE      5.  Dry mouth, mouth breathing, continue Orajel and oral hygiene      Nemesio Witt MD    3/8/2022

## 2022-03-08 NOTE — PROGRESS NOTES
Progress Note    Patient: Jerilynn Krabbe MRN: [id number]  SSN: [id number]    YOB: 1945  Age: 78 y.o. Sex: female      Admit Date: 2/21/2022    LOS: 11 days     Subjective:     Patient was seen and examined. Patient is followed for abnormal ERLINDA. She has a past medical history of hypertension, diabetes, neuropathy, PAD, and CVA. Condition unchanged. Patient is lying in the bed with eyes closed. No acute events noted overnight. Telemetry Review: Normal sinus rhythm; heart rate 80s, no ectopy. Review of Symptoms:   Review of Systems   Constitutional: Negative. Cardiovascular: Negative for chest pain, dyspnea on exertion, irregular heartbeat and palpitations. Respiratory: Negative for cough, shortness of breath and wheezing. Gastrointestinal: Negative for abdominal pain, constipation, nausea and vomiting. Neurological: Negative for light-headedness. Objective:     Vitals:    03/07/22 0835 03/07/22 1725 03/07/22 2100 03/08/22 0745   BP: (!) 149/57 (!) 151/61 (!) 159/64 (!) 180/63   Pulse: 82 80 76 90   Resp: 20 18 18 18   Temp: 97.8 °F (36.6 °C) 98.1 °F (36.7 °C) 98.9 °F (37.2 °C) 98.5 °F (36.9 °C)   SpO2: 96% 96% 99% 100%   Weight:       Height:            Intake and Output:  Current Shift: No intake/output data recorded. Last three shifts: 03/06 1901 - 03/08 0700  In: [facility]   Out: 99784 [Urine:09097]    Physical Exam:   . Physical Exam  Constitutional:       General: She is not in acute distress. Appearance: Normal appearance. Cardiovascular:      Rate and Rhythm: Normal rate and regular rhythm. Pulses: Normal pulses. Heart sounds: Normal heart sounds. Pulmonary:      Effort: Pulmonary effort is normal. No respiratory distress. Breath sounds: Normal breath sounds. No stridor. No wheezing, rhonchi or rales. Abdominal:      General: Abdomen is flat. Bowel sounds are normal. There is no distension. Palpations: Abdomen is soft. Tenderness:  There is no abdominal tenderness. There is no rebound. Feet:      Left foot:      Skin integrity: Skin breakdown and dry skin present. Comments: Left   Skin:     General: Skin is warm and dry. Neurological:      Mental Status: She is lethargic. Psychiatric:         Mood and Affect: Mood normal.         Behavior: Behavior normal.           Lab/Data Review: All lab results for the last 24 hours reviewed.          Current Facility-Administered Medications:     benzocaine-zinc cl-benzalkonium cl (ORAJEL) 20-0.1-0.02 % mucosal gel, , Oral, BID, Fred Barnes MD    amLODIPine (NORVASC) tablet 5 mg, 5 mg, Oral, BID, Hiren aMdrid MD, 5 mg at 03/08/22 0906    linezolid (ZYVOX) tablet 600 mg, 600 mg, Oral, Q12H, Orestes Norman MD, 600 mg at 03/08/22 0906    metoprolol succinate (TOPROL-XL) XL tablet 50 mg, 50 mg, Oral, BID, Hiren Madrid MD, 50 mg at 03/08/22 0906    insulin glargine (LANTUS) injection 25 Units, 25 Units, SubCUTAneous, QHS, Orestes Norman MD, 25 Units at 03/07/22 2311    acetaminophen (TYLENOL) solution 650 mg, 650 mg, Per G Tube, Q4H PRN, Arlene JACKSON MD, 650 mg at 03/07/22 0222    gabapentin (NEURONTIN) capsule 300 mg, 300 mg, Oral, BID, Kalpesh Lara MD, 300 mg at 03/08/22 0906    0.45% sodium chloride infusion, 25 mL/hr, IntraVENous, CONTINUOUS, Kalpesh Lara MD, Last Rate: 25 mL/hr at 03/07/22 1717, 25 mL/hr at 03/07/22 1717    0.9% sodium chloride infusion 250 mL, 250 mL, IntraVENous, PRN, Israel Soto MD    insulin lispro (HUMALOG) injection, , SubCUTAneous, Q4H, Israel Soto MD, 9 Units at 03/08/22 0630    latanoprost (XALATAN) 0.005 % ophthalmic solution 1 Drop, 1 Drop, Right Eye, QPM, Israel Soto MD, 1 Drop at 03/07/22 1721    0.9% sodium chloride infusion 250 mL, 250 mL, IntraVENous, PRN, Pricila Louise MD    zinc oxide-white petrolatum 20-75 % topical paste, , Topical, BID, James Reeves MD, Given at 03/07/22 2100    lidocaine (XYLOCAINE) 4 % (40 mg/mL) topical solution, , Topical, PRN, Makenna JACKSON MD, Given at 02/26/22 2207    [Held by provider] rivaroxaban (XARELTO) tablet 2.5 mg, 2.5 mg, Oral, BID WITH MEALS, Jose Obrien MD, 2.5 mg at 02/24/22 1720    [Held by provider] atorvastatin (LIPITOR) tablet 80 mg, 80 mg, Oral, DAILY, Jose Obrien MD, 80 mg at 02/26/22 0900    labetaloL (NORMODYNE;TRANDATE) 20 mg/4 mL (5 mg/mL) injection 10 mg, 10 mg, IntraVENous, Q6H PRN, Jose Obrien MD, 10 mg at 03/02/22 1224    sodium chloride (NS) flush 5-40 mL, 5-40 mL, IntraVENous, PRN, Orestes Norman MD    brimonidine (ALPHAGAN) 0.2 % ophthalmic solution 1 Drop, 1 Drop, Both Eyes, TID, Orestes Norman MD, 1 Drop at 03/08/22 4371    ferrous sulfate tablet 325 mg, 325 mg, Oral, BID, Orestes Norman MD, 325 mg at 03/08/22 0906    glucose chewable tablet 16 g, 4 Tablet, Oral, PRN, Makenna JACKSON MD    glucagon (GLUCAGEN) injection 1 mg, 1 mg, IntraMUSCular, PRN, Orestes Contreras MD    dextrose 10% infusion 125-250 mL, 125-250 mL, IntraVENous, PRN, Makenna JACKSON MD      Assessment:     Active Problems:    CVA (cerebral vascular accident) ([facility] [address] (2/21/2022)      Elevated troponin (2/21/2022)        Plan:     Case discussed with Collaborating physician Dr. Kervin Dee and our recommendations are as follows:     1. PAD:  Abnormal ERLINDA, left great toe dry gangrene. Podiatry following. Amputations noted on right foot as well. Arterial duplex showed moderate to severe disease in the bilateral SFA and popliteal arteries, occluded right posterior tibial artery and occluded left peroneal artery. Med management as able     2. Recent CVA, followed by neurology, will consider JUS if medically necessary. Thank you for involving us in the care of this patient. Please do not hesitate to call if additional questions arise. If after hours please call [phone].     Signed By: James Laguna NP     March 8, 2022         [facility]  Interventional Cardiologist    [facility]  [address]    [address], [address] [facility]  [phone]

## 2022-03-08 NOTE — PROGRESS NOTES
UROLOGY Progress Note         [phone]      Daily Progress Note: 3/8/2022      Subjective: The patient is seen for UROLOGIC follow up for gross hematuria. Patient has a history of multiple strokes. She was on aspirin and Xarelto. Aspirin on Xarelto were held due to hematuria. Patient was put on CBI. Today her urine is clear, no blood or blood clots noted. No need for CBI. Patient is lying in the bed not in acute distress.       Problem List:  Patient Active Problem List   Diagnosis Code    HTN (hypertension) I10    Hyperlipidemia E78.5    Neuropathy G62.9    Sciatica M54.30    Diabetes mellitus with neurological manifestations, uncontrolled ([facility] E11.49, E11.65    Infection of nailbed of toe of left foot L03.032    PAD (peripheral artery disease) ([facility]) I73.9    Hypercalcemia E83.52    DM type 2 causing vascular disease ([facility] E11.59    Type 2 diabetes mellitus with nephropathy ([facility]) E11.21    Acute osteomyelitis of ankle or foot ([facility] M86.179    Diabetic peripheral neuropathy ([facility]) E11.42    Spinal stenosis in cervical region M48.02    Ischemia of both lower extremities I99.8    Pain in both lower legs M79.661, M79.662    CVA (cerebral vascular accident) ([facility] I63.9    Elevated troponin R77.8         Medications reviewed  Current Facility-Administered Medications   Medication Dose Route Frequency    benzocaine-zinc cl-benzalkonium cl (ORAJEL) 20-0.1-0.02 % mucosal gel   Oral BID    amLODIPine (NORVASC) tablet 5 mg  5 mg Oral BID    linezolid (ZYVOX) tablet 600 mg  600 mg Oral Q12H    metoprolol succinate (TOPROL-XL) XL tablet 50 mg  50 mg Oral BID    insulin glargine (LANTUS) injection 25 Units  25 Units SubCUTAneous QHS    acetaminophen (TYLENOL) solution 650 mg  650 mg Per G Tube Q4H PRN    gabapentin (NEURONTIN) capsule 300 mg  300 mg Oral BID    0.45% sodium chloride infusion  25 mL/hr IntraVENous CONTINUOUS    0.9% sodium chloride infusion 250 mL  250 mL IntraVENous PRN  insulin lispro (HUMALOG) injection   SubCUTAneous Q4H    latanoprost (XALATAN) 0.005 % ophthalmic solution 1 Drop  1 Drop Right Eye QPM    0.9% sodium chloride infusion 250 mL  250 mL IntraVENous PRN    zinc oxide-white petrolatum 20-75 % topical paste   Topical BID    lidocaine (XYLOCAINE) 4 % (40 mg/mL) topical solution   Topical PRN    [Held by provider] rivaroxaban (XARELTO) tablet 2.5 mg  2.5 mg Oral BID WITH MEALS    [Held by provider] atorvastatin (LIPITOR) tablet 80 mg  80 mg Oral DAILY    labetaloL (NORMODYNE;TRANDATE) 20 mg/4 mL (5 mg/mL) injection 10 mg  10 mg IntraVENous Q6H PRN    sodium chloride (NS) flush 5-40 mL  5-40 mL IntraVENous PRN    brimonidine (ALPHAGAN) 0.2 % ophthalmic solution 1 Drop  1 Drop Both Eyes TID    ferrous sulfate tablet 325 mg  325 mg Oral BID    glucose chewable tablet 16 g  4 Tablet Oral PRN    glucagon (GLUCAGEN) injection 1 mg  1 mg IntraMUSCular PRN    dextrose 10% infusion 125-250 mL  125-250 mL IntraVENous PRN       Review of Systems:   Review of Systems   Unable to perform ROS: Other (Stroke)           Objective:   Physical Exam  Constitutional:       Appearance: Normal appearance. HENT:      Head: Normocephalic. Eyes:      Pupils: Pupils are equal, round, and reactive to light. Cardiovascular:      Rate and Rhythm: Normal rate. Pulmonary:      Effort: Pulmonary effort is normal.   Abdominal:      General: Abdomen is flat. Palpations: Abdomen is soft. Genitourinary:     Comments: CBI will discontinue   Bell cath draining clear diluted urine  Musculoskeletal:      Cervical back: Neck supple. Skin:     General: Skin is warm. Capillary Refill: Capillary refill takes less than 2 seconds. Neurological:      Mental Status: Mental status is at baseline.    Psychiatric:         Behavior: Behavior normal.            Visit Vitals  BP (!) 180/63 (BP Patient Position: Lying)   Pulse 90   Temp 98.5 °F (36.9 °C)   Resp 18   Ht 5' 7.01\" (1.702 m)   Wt 181 lb 3.5 oz (82.2 kg)   SpO2 100%   BMI 28.38 kg/m²         Data Review:       Recent Days:  Recent Labs     03/08/22  0740 03/07/22  0738 03/06/22  0948   WBC 11.8* 12.8* 12.8*   HGB 7.7* 7.9* 7.9*   HCT 24.5* 26.0* 25.4*   * 398 360     Recent Labs     03/08/22  0740 03/07/22  0738 03/06/22  0948   * 147* 147*   K 4.8 4.8 4.8   * 119* 117*   CO2 25 24 25   * 270* 289*   BUN 32* 30* 29*   CREA 1.43* 1.65* 1.63*   CA 8.4* 8.4* 8.5   ALB 1.8* 1.8* 1.8*   TBILI 0.2 0.2 0.2   ALT 98* 122* 150*   INR 1.4* 1.4* 1.3*       24 Hour Results:  Recent Results (from the past 24 hour(s))   GLUCOSE, POC    Collection Time: 03/07/22  3:43 PM   Result Value Ref Range    Glucose (POC) 292 (H) 65 - 117 mg/dL    Performed by Zenaida Patel    GLUCOSE, POC    Collection Time: 03/07/22  8:28 PM   Result Value Ref Range    Glucose (POC) 317 (H) 65 - 117 mg/dL    Performed by Yari Gutierrez    GLUCOSE, POC    Collection Time: 03/07/22 11:00 PM   Result Value Ref Range    Glucose (POC) 304 (H) 65 - 117 mg/dL    Performed by Yari Gutierrez    GLUCOSE, POC    Collection Time: 03/08/22  2:25 AM   Result Value Ref Range    Glucose (POC) 239 (H) 65 - 117 mg/dL    Performed by Yari Exepron    GLUCOSE, POC    Collection Time: 03/08/22  6:01 AM   Result Value Ref Range    Glucose (POC) 251 (H) 65 - 117 mg/dL    Performed by Yari Gutierrez    PROTHROMBIN TIME + INR    Collection Time: 03/08/22  7:40 AM   Result Value Ref Range    Prothrombin time 16.9 (H) 11.9 - 14.6 sec    INR 1.4 (H) 0.9 - 1.1     HEPATIC FUNCTION PANEL    Collection Time: 03/08/22  7:40 AM   Result Value Ref Range    Protein, total 6.4 6.4 - 8.2 g/dL    Albumin 1.8 (L) 3.5 - 5.0 g/dL    Globulin 4.6 (H) 2.0 - 4.0 g/dL    A-G Ratio 0.4 (L) 1.1 - 2.2      Bilirubin, total 0.2 0.2 - 1.0 mg/dL    Bilirubin, direct <0.1 0.0 - 0.2 mg/dL    Alk.  phosphatase 92 45 - 117 U/L    AST (SGOT) 27 15 - 37 U/L    ALT (SGPT) 98 (H) 12 - 78 U/L   CBC WITH AUTOMATED DIFF    Collection Time: 03/08/22  7:40 AM   Result Value Ref Range    WBC 11.8 (H) 3.6 - 11.0 K/uL    RBC 2.67 (L) 3.80 - 5.20 M/uL    HGB 7.7 (L) 11.5 - 16.0 g/dL    HCT 24.5 (L) 35.0 - 47.0 %    MCV 91.8 80.0 - 99.0 FL    MCH 28.8 26.0 - 34.0 PG    MCHC 31.4 30.0 - 36.5 g/dL    RDW 15.9 (H) 11.5 - 14.5 %    PLATELET 531 (H) 176 - 400 K/uL    MPV 11.2 8.9 - 12.9 FL    NRBC 0.0 0.0  WBC    ABSOLUTE NRBC 0.00 0.00 - 0.01 K/uL    NEUTROPHILS 74 32 - 75 %    LYMPHOCYTES 19 12 - 49 %    MONOCYTES 4 (L) 5 - 13 %    EOSINOPHILS 1 0 - 7 %    BASOPHILS 1 0 - 1 %    IMMATURE GRANULOCYTES 1 (H) 0 - 0.5 %    ABS. NEUTROPHILS 8.9 (H) 1.8 - 8.0 K/UL    ABS. LYMPHOCYTES 2.2 0.8 - 3.5 K/UL    ABS. MONOCYTES 0.5 0.0 - 1.0 K/UL    ABS. EOSINOPHILS 0.1 0.0 - 0.4 K/UL    ABS. BASOPHILS 0.1 0.0 - 0.1 K/UL    ABS. IMM.  GRANS. 0.1 (H) 0.00 - 0.04 K/UL    DF AUTOMATED     METABOLIC PANEL, BASIC    Collection Time: 03/08/22  7:40 AM   Result Value Ref Range    Sodium 148 (H) 136 - 145 mmol/L    Potassium 4.8 3.5 - 5.1 mmol/L    Chloride 116 (H) 97 - 108 mmol/L    CO2 25 21 - 32 mmol/L    Anion gap 7 5 - 15 mmol/L    Glucose 280 (H) 65 - 100 mg/dL    BUN 32 (H) 6 - 20 mg/dL    Creatinine 1.43 (H) 0.55 - 1.02 mg/dL    BUN/Creatinine ratio 22 (H) 12 - 20      GFR est AA 43 (L) >60 ml/min/1.73m2    GFR est non-AA 36 (L) >60 ml/min/1.73m2    Calcium 8.4 (L) 8.5 - 10.1 mg/dL   GLUCOSE, POC    Collection Time: 03/08/22 11:16 AM   Result Value Ref Range    Glucose (POC) 301 (H) 65 - 117 mg/dL    Performed by Sina Casey            Assessment/     Patient Active Problem List   Diagnosis Code    HTN (hypertension) I10    Hyperlipidemia E78.5    Neuropathy G62.9    Sciatica M54.30    Diabetes mellitus with neurological manifestations, uncontrolled ([facility] E11.49, E11.65    Infection of nailbed of toe of left foot L03.032    PAD (peripheral artery disease) ([facility]) I73.9    Hypercalcemia E83.52    DM type 2 causing vascular disease ([facility] E11.59    Type 2 diabetes mellitus with nephropathy ([facility] E11.21    Acute osteomyelitis of ankle or foot ([facility] M86.179    Diabetic peripheral neuropathy ([facility] E11.42    Spinal stenosis in cervical region M48.02    Ischemia of both lower extremities I99.8    Pain in both lower legs M79.661, M79.662    CVA (cerebral vascular accident) ([facility] I63.9    Elevated troponin R77.8       Plan:  1. Gross hematuria  -Has been resolved  Discontinue CBI,   -Patient can be restarted on aspirin  Will continue to monitor hematuria while patient on aspirin  2.   CVA followed by neurology    Care Plan discussed with: Dr. Arnaldo Null, Attending Physician      Sheri Love, KOURTNEY

## 2022-03-08 NOTE — PROGRESS NOTES
Progress Note    Patient: Misha Joe MRN: [id number]  SSN: [id number]    YOB: 1945  Age: 78 y.o. Sex: female      Admit Date: 2/21/2022    LOS: 11 days     Subjective:     Patient is lying in bed. alert    Objective:     Vitals:    03/07/22 0835 03/07/22 1725 03/07/22 2100 03/08/22 0745   BP: (!) 149/57 (!) 151/61 (!) 159/64 (!) 180/63   Pulse: 82 80 76 90   Resp: 20 18 18 18   Temp: 97.8 °F (36.6 °C) 98.1 °F (36.7 °C) 98.9 °F (37.2 °C) 98.5 °F (36.9 °C)   SpO2: 96% 96% 99% 100%   Weight:       Height:            Intake and Output:  Current Shift: No intake/output data recorded. Last three shifts: 03/06 1901 - 03/08 0700  In: 93456   Out: 14289 [Urine:[id number]]    Physical Exam:   General:  Alert, dysphasia    Eyes:  Conjunctivae/corneas clear. PERRL, EOMs intact. Fundi benign   Ears:  Normal TMs and external ear canals both ears. Nose: Nares normal. Septum midline. Mucosa normal. No drainage or sinus tenderness. Mouth/Throat: Lips, mucosa, and tongue normal. Teeth and gums normal.   Neck: Supple, symmetrical, trachea midline, no adenopathy, thyroid: no enlargment/tenderness/nodules, no carotid bruit and no JVD. Back:   Symmetric, no curvature. ROM normal. No CVA tenderness. Lungs:   Clear to auscultation bilaterally. Heart:  Regular rate and rhythm, S1, S2 normal, no murmur, click, rub or gallop. Abdomen:   Soft, non-tender. Bowel sounds normal. No masses,  No organomegaly. Extremities: Extremities normal, atraumatic, no cyanosis or edema. Pulses: 2+ and symmetric all extremities. Skin: Skin color, texture, turgor normal. No rashes or lesions   Lymph nodes:  Weakness on right side   Neurologic:        Lab/Data Review: All lab results for the last 24 hours reviewed.      Recent Results (from the past 24 hour(s))   GLUCOSE, POC    Collection Time: 03/07/22  8:28 PM   Result Value Ref Range    Glucose (POC) 317 (H) 65 - 117 mg/dL    Performed by Barry Phoenix, POC Collection Time: 03/07/22 11:00 PM   Result Value Ref Range    Glucose (POC) 304 (H) 65 - 117 mg/dL    Performed by Ofelia Bradford    GLUCOSE, POC    Collection Time: 03/08/22  2:25 AM   Result Value Ref Range    Glucose (POC) 239 (H) 65 - 117 mg/dL    Performed by Ofelia Bradford    GLUCOSE, POC    Collection Time: 03/08/22  6:01 AM   Result Value Ref Range    Glucose (POC) 251 (H) 65 - 117 mg/dL    Performed by Ofelia Bradford    PROTHROMBIN TIME + INR    Collection Time: 03/08/22  7:40 AM   Result Value Ref Range    Prothrombin time 16.9 (H) 11.9 - 14.6 sec    INR 1.4 (H) 0.9 - 1.1     HEPATIC FUNCTION PANEL    Collection Time: 03/08/22  7:40 AM   Result Value Ref Range    Protein, total 6.4 6.4 - 8.2 g/dL    Albumin 1.8 (L) 3.5 - 5.0 g/dL    Globulin 4.6 (H) 2.0 - 4.0 g/dL    A-G Ratio 0.4 (L) 1.1 - 2.2      Bilirubin, total 0.2 0.2 - 1.0 mg/dL    Bilirubin, direct <0.1 0.0 - 0.2 mg/dL    Alk. phosphatase 92 45 - 117 U/L    AST (SGOT) 27 15 - 37 U/L    ALT (SGPT) 98 (H) 12 - 78 U/L   CBC WITH AUTOMATED DIFF    Collection Time: 03/08/22  7:40 AM   Result Value Ref Range    WBC 11.8 (H) 3.6 - 11.0 K/uL    RBC 2.67 (L) 3.80 - 5.20 M/uL    HGB 7.7 (L) 11.5 - 16.0 g/dL    HCT 24.5 (L) 35.0 - 47.0 %    MCV 91.8 80.0 - 99.0 FL    MCH 28.8 26.0 - 34.0 PG    MCHC 31.4 30.0 - 36.5 g/dL    RDW 15.9 (H) 11.5 - 14.5 %    PLATELET 723 (H) 850 - 400 K/uL    MPV 11.2 8.9 - 12.9 FL    NRBC 0.0 0.0  WBC    ABSOLUTE NRBC 0.00 0.00 - 0.01 K/uL    NEUTROPHILS 74 32 - 75 %    LYMPHOCYTES 19 12 - 49 %    MONOCYTES 4 (L) 5 - 13 %    EOSINOPHILS 1 0 - 7 %    BASOPHILS 1 0 - 1 %    IMMATURE GRANULOCYTES 1 (H) 0 - 0.5 %    ABS. NEUTROPHILS 8.9 (H) 1.8 - 8.0 K/UL    ABS. LYMPHOCYTES 2.2 0.8 - 3.5 K/UL    ABS. MONOCYTES 0.5 0.0 - 1.0 K/UL    ABS. EOSINOPHILS 0.1 0.0 - 0.4 K/UL    ABS. BASOPHILS 0.1 0.0 - 0.1 K/UL    ABS. IMM.  GRANS. 0.1 (H) 0.00 - 0.04 K/UL    DF AUTOMATED     METABOLIC PANEL, BASIC    Collection Time: 03/08/22  7:40 AM   Result Value Ref Range    Sodium 148 (H) 136 - 145 mmol/L    Potassium 4.8 3.5 - 5.1 mmol/L    Chloride 116 (H) 97 - 108 mmol/L    CO2 25 21 - 32 mmol/L    Anion gap 7 5 - 15 mmol/L    Glucose 280 (H) 65 - 100 mg/dL    BUN 32 (H) 6 - 20 mg/dL    Creatinine 1.43 (H) 0.55 - 1.02 mg/dL    BUN/Creatinine ratio 22 (H) 12 - 20      GFR est AA 43 (L) >60 ml/min/1.73m2    GFR est non-AA 36 (L) >60 ml/min/1.73m2    Calcium 8.4 (L) 8.5 - 10.1 mg/dL   GLUCOSE, POC    Collection Time: 03/08/22 11:16 AM   Result Value Ref Range    Glucose (POC) 301 (H) 65 - 117 mg/dL    Performed by Dalton Smith    GLUCOSE, POC    Collection Time: 03/08/22  2:57 PM   Result Value Ref Range    Glucose (POC) 294 (H) 65 - 117 mg/dL    Performed by Alyson Castillo          Assessment:     Active Problems:    CVA (cerebral vascular accident) ([address] (2/21/2022)      Elevated troponin (2/21/2022)        Plan:     Case Management for discharge planning    Signed By: Lisa Costa MD     March 8, 2022

## 2022-03-08 NOTE — PROGRESS NOTES
Problem: Mobility Impaired (Adult and Pediatric)  Goal: *Acute Goals and Plan of Care (Insert Text)  Description: Revised 3/4/22  Pt will be I with LE HEP in 7 days. Pt will perform bed mobility with min Ax1 in 7 days. Pt will perform transfers with min Ax1 in 7 days. Pt will amb 5-10 feet with LRAD safely with min Ax1 in 7 days. Pt will demonstrate improvement in dynamic seated and standing balance with min A in 7 days. Outcome: Progressing Towards Goal   PHYSICAL THERAPY TREATMENT  Patient: Sherrell Oshea (36 y.o. female)  Date: 3/8/2022  Diagnosis: CVA (cerebral vascular accident) ([facility] [I63.9]  Elevated troponin [R77.8] <principal problem not specified>  Procedure(s) (LRB):  PERCUTANEOUS ENDOSCOPIC GASTROSTOMY TUBE INSERTION (N/A)  ESOPHAGOGASTRODUODENOSCOPY (EGD) (N/A) 6 Days Post-Op  Precautions:    Chart, physical therapy assessment, plan of care and goals were reviewed. ASSESSMENT  Patient continues with skilled PT services and is progressing towards goals. Pt. Supine upon entering room. Rolling side to side with max assist X 2. Pt. Had BM and needed to be cleaned up. Supine to sit with total assist X 2. Pt. Required total assist X 2 for sitting balance. Pt. Unable to maintain sitting balance this session. Pt. Leaning posteriorly and to right. Pt. Fatigues easily and requires rest breaks often. Pt. Drowsy towards end of session. Current Level of Function Impacting Discharge (mobility/balance): A X 2/decreased mobility    Other factors to consider for discharge: safety/assistance level         PLAN :  Patient continues to benefit from skilled intervention to address the above impairments. Continue treatment per established plan of care. to address goals.     Recommendation for discharge: (in order for the patient to meet his/her long term goals)  Elie Patel    This discharge recommendation:  Has been made in collaboration with the attending provider and/or case management    IF patient discharges home will need the following DME: SNF       SUBJECTIVE:   Patient non verbal during session. Pt. Manny Child to say a few words. CO RX with TRIANA for increased mobility and safety. OBJECTIVE DATA SUMMARY:   Critical Behavior:  Neurologic State: Alert  Orientation Level: Unable to verbalize  Cognition: Unable to assess (comment)  Safety/Judgement: Decreased insight into deficits  Functional Mobility Training:  Bed Mobility:  Rolling: Maximum assistance;Assist x2  Supine to Sit: Total assistance;Assist x2  Sit to Supine: Total assistance;Assist x2  Scooting: Total assistance;Assist x2     Pt. Needed to be changed and cleaned up due to BM upon arrival. OT and PT changed pt.and bed   Changed bandage from sacrum. Transfers:                        Balance:  Sitting: Impaired; With support  Sitting - Static: Poor (constant support)  Sitting - Dynamic: Poor (constant support)  Ambulation/Gait Training:            PROM to bilat LEs within available ranges 10 reps each. Pain Rating: Pt. Grimaced with mobility but unable to specify if in pain. Activity Tolerance:   Poor and requires frequent rest breaks  Please refer to the flowsheet for vital signs taken during this treatment. After treatment patient left in no apparent distress:   Supine in bed, Call bell within reach, Bed / chair alarm activated, Caregiver / family present, Side rails x 3, and RN notified    COMMUNICATION/COLLABORATION:   The patients plan of care was discussed with: Occupational therapy assistant and Registered nurse.      Elissa Valenzuela   Time Calculation: 38 mins

## 2022-03-08 NOTE — PROGRESS NOTES
Problem: Communication Impaired (Adult)  Goal: *Acute Goals and Plan of Care (Insert Text)  Description: -Patient will approximate name with mod cues within 7 days.   -Patient will perform automatic speech tasks with 90% accuracy with mod cues within 7 days.   -Patient will follow 2 step commands w/ min cues w/ 90% accuracy within 7 days.   -Patient will utilize communication board w/ a field of 2-4 with 90% accuracy w/ min cues within 7 days.   -Patient will answer basic yes/no questions w/ 90% accuracy w/ min cues within 7 days. Outcome: Progressing Towards Goal    SPEECH LANGUAGE PATHOLOGY TREATMENT  Patient: Manuela Akins (73 y.o. female)  Date: 3/8/2022  Diagnosis: CVA (cerebral vascular accident) ([facility] [I63.9]  Elevated troponin [R77.8] <principal problem not specified>  Procedure(s) (LRB):  PERCUTANEOUS ENDOSCOPIC GASTROSTOMY TUBE INSERTION (N/A)  ESOPHAGOGASTRODUODENOSCOPY (EGD) (N/A) 6 Days Post-Op    ASSESSMENT:  Patient asleep, but easily aroused with SLP talking to her daughter and niece. Daughter reported that the patient does much better early in the morning and more alert and talkative. She stated that the client responded to her nephew saying I love you with \"I love you too. \" Patient agreeable to speech treatment, but noted lethargy. Daughter reported they started the PEG yesterday and the patient didn't do well, resulting in excessive diarrhea x7. However, today the nurse reported x3. Therefore, the daughter is concerned that she may be too tired from yesterday and from visitors. Patient presents receptive aphasia. She only answered a few yes/no orientation questions, such as her name and daughter's name and greetings. She was unable to repeat single syllables or single words. Attempted song, this little light of mine, and noted appropriate humming of the song. Patient with flaccid R facial droop and reduced lingual ROM.  Patient's sister in law came during session, she didn't attempt to verbally communicated, but she listened while she talked, and grabbed her hand to hold. Daughter stated they will continue to work with communication board for basic wants and needs. PLAN:  Patient continues to benefit from skilled intervention to address the above impairments. Continue treatment per established plan of care. Discharge Recommendations:  IRF or SNF     SUBJECTIVE:   Patient stated yes. OBJECTIVE:   Mental Status:  Neurologic State: Alert  Orientation Level: Unable to verbalize  Cognition: Unable to assess (comment)  Perception: Appears intact  Perseveration: No perseveration noted  Safety/Judgement: Decreased insight into deficits    Pain: 0/10             After treatment:   Patient left in no apparent distress in bed, Call bell within reach, Nursing notified, and Caregiver / family present    COMMUNICATION/EDUCATION:   Patient, daughter, and niece were educated regarding her deficit(s) of aphasia/dysarthria/oral apraxia as this relates to her diagnosis of CVA. They demonstrated Good understanding as evidenced by verbal responsiveness. The patient's plan of care including recommendations, planned interventions, and recommended diet changes were discussed with: Registered nurse.      Rosey Saldana  Time Calculation: 36 mins

## 2022-03-08 NOTE — PROGRESS NOTES
OCCUPATIONAL THERAPY TREATMENT  Patient: Jerilynn Krabbe (89 y.o. female)  Date: 3/8/2022  Diagnosis: CVA (cerebral vascular accident) ([facility] [address] [I63.9]  Elevated troponin [R77.8] <principal problem not specified>  Procedure(s) (LRB):  PERCUTANEOUS ENDOSCOPIC GASTROSTOMY TUBE INSERTION (N/A)  ESOPHAGOGASTRODUODENOSCOPY (EGD) (N/A) 6 Days Post-Op  Precautions:    Chart, occupational therapy assessment, plan of care, and goals were reviewed. ASSESSMENT  Patient continues with skilled OT services and is progressing towards goals. Upon TRIANA/PTA arrival, pt semi supine in bed and agreeable to tx session. Pt noted upon beginning of mobility to be soiled in BM. Pt completed rolling with MaxA x2 for completion of bowel/bladder hygiene. When pt on back pt able to assist with bowel/bladder hygiene with MaxA. PCT entered room to assist with toileting and placing of new Mepilex to buttocks. Pt completed supine<>sit and scooting to EOB with total A x2 and unable to maintain sitting balance. Semi supine PROM completed to RUE. Pt left semi supine in bed with daughter at bedside and call bell within reach. Will continue to follow pt throughout remainder of stay and progress towards OT goals. Recommending SNF at discharge when medically appropriate. Other factors to consider for discharge: home support, PLOF, severity of deficits         PLAN :  Patient continues to benefit from skilled intervention to address the above impairments. Continue treatment per established plan of care. to address goals. Recommendation for discharge: (in order for the patient to meet his/her long term goals)  Elie Patel    This discharge recommendation:  Has been made in collaboration with the attending provider and/or case management    IF patient discharges home will need the following DME: TBD       SUBJECTIVE:   Patient stated Crowe Sowmya when asked if she was falling asleep, pt then laughed.     OBJECTIVE DATA SUMMARY: Cognitive/Behavioral Status:  Neurologic State: Alert  Orientation Level: Unable to verbalize  Cognition: Unable to assess (comment)    Functional Mobility and Transfers for ADLs:  Bed Mobility:  Rolling: Maximum assistance;Assist x2  Supine to Sit: Total assistance;Assist x2  Sit to Supine: Total assistance;Assist x2  Scooting: Total assistance;Assist x2    Balance:  Sitting: Impaired; With support  Sitting - Static: Poor (constant support)  Sitting - Dynamic: Poor (constant support)    ADL Intervention:  Upper Body [address]: Maximum assistance    Toileting  Bladder Hygiene: Maximum assistance  Bowel Hygiene: Maximum assistance    Therapeutic Exercises:   Exercise Sets Reps AROM AAROM PROM Self PROM Comments   Shoulder flex/ext 1 10 [] [] [x]  RUE []    Elbow flex/ext 1 10 [] [] [x]  RUE []    Wrist flex/ext 1 10 [] [] [x]  RUE []      Pain:  Pt unable to verbalize. Pt grimaced in pain throughout mobility 4-5/10 via FACES scale    Activity Tolerance:   Fair and requires rest breaks  Please refer to the flowsheet for vital signs taken during this treatment. After treatment patient left in no apparent distress:   Supine in bed, Call bell within reach, Bed / chair alarm activated, and Side rails x 3    COMMUNICATION/COLLABORATION:   The patients plan of care was discussed with: Physical therapy assistant, Registered nurse, and Certified nursing assistant/patient care technician. [place] with PT for increased safety for pt and clinician. KONG Yanes  Time Calculation: 38 mins    Problem: Self Care Deficits Care Plan (Adult)  Goal: *Acute Goals and Plan of Care (Insert Text)  Description: 1. Pt will be min A sup <> sit in prep for EOB ADLs  2. Pt will be mod I grooming sitting EOB  3. Pt will be min A sit <> stand/stand pivot transfer in prep for toileting LRAD  4. Pt will be min A toileting/toilet transfer/cloth mgmt LRAD  6.  Pt will be mod I following UE HEP in prep for self care tasks      Outcome: Progressing Towards Goal

## 2022-03-08 NOTE — CONSULTS
Neurology Progress Note    Patient ID:  Carlee Lee  [id number]  78 y.o.  1945    Subjective: patient opens eyes. Not talking or following commands       Patient is a 78year old woman with HTN, DM who had a stroke last week with right facial droop and right sided weakness worked up in [facility] who was brought in for worsening speech and left sided weakness per daughters at bedside. She is living at home and uses a walker to ambulate at baseline. She has had multiple strokes including left parasaggital stroke and a left Melany stroke after that. All antiplts and anticoagulating agents held due to hematuria. Hematuria occurred when she was on asa 81mg and xarelto 2.5 mg bid. atorvastatin also held due to rising liver function tests.      Current Facility-Administered Medications   Medication Dose Route Frequency    benzocaine-zinc cl-benzalkonium cl (ORAJEL) 20-0.1-0.02 % mucosal gel   Oral BID    amLODIPine (NORVASC) tablet 5 mg  5 mg Oral BID    linezolid (ZYVOX) tablet 600 mg  600 mg Oral Q12H    metoprolol succinate (TOPROL-XL) XL tablet 50 mg  50 mg Oral BID    insulin glargine (LANTUS) injection 25 Units  25 Units SubCUTAneous QHS    acetaminophen (TYLENOL) solution 650 mg  650 mg Per G Tube Q4H PRN    gabapentin (NEURONTIN) capsule 300 mg  300 mg Oral BID    0.45% sodium chloride infusion  25 mL/hr IntraVENous CONTINUOUS    0.9% sodium chloride infusion 250 mL  250 mL IntraVENous PRN    insulin lispro (HUMALOG) injection   SubCUTAneous Q4H    latanoprost (XALATAN) 0.005 % ophthalmic solution 1 Drop  1 Drop Right Eye QPM    0.9% sodium chloride infusion 250 mL  250 mL IntraVENous PRN    zinc oxide-white petrolatum 20-75 % topical paste   Topical BID    lidocaine (XYLOCAINE) 4 % (40 mg/mL) topical solution   Topical PRN    [Held by provider] rivaroxaban (XARELTO) tablet 2.5 mg  2.5 mg Oral BID WITH MEALS    [Held by provider] atorvastatin (LIPITOR) tablet 80 mg  80 mg Oral DAILY    labetaloL (NORMODYNE;TRANDATE) 20 mg/4 mL (5 mg/mL) injection 10 mg  10 mg IntraVENous Q6H PRN    sodium chloride (NS) flush 5-40 mL  5-40 mL IntraVENous PRN    brimonidine (ALPHAGAN) 0.2 % ophthalmic solution 1 Drop  1 Drop Both Eyes TID    ferrous sulfate tablet 325 mg  325 mg Oral BID    glucose chewable tablet 16 g  4 Tablet Oral PRN    glucagon (GLUCAGEN) injection 1 mg  1 mg IntraMUSCular PRN    dextrose 10% infusion 125-250 mL  125-250 mL IntraVENous PRN     Objective:     Patient Vitals for the past 8 hrs:   BP Temp Pulse Resp SpO2   03/08/22 0745 (!) 180/63 98.5 °F (36.9 °C) 90 18 100 %     No intake/output data recorded.   03/06 1901 - 03/08 0700  In: 39985   Out: 81410 [Urine:56462]    Lab Review   Recent Results (from the past 24 hour(s))   DUPLEX LOWER EXT ARTERY BILAT    Collection Time: 03/07/22 11:17 AM   Result Value Ref Range    Left Dist DARREN Velocity 66.1 cm/s    Left popliteal dist sys .0 cm/s    Left popliteal prox sys .0 cm/s    Left Dist PTA PSV 43.0 cm/s    Left super femoral dist sys .0 cm/s    Left super femoral mid sys PSV 98.0 cm/s    Left super femoral prox sys .0 cm/s    Left Prox PFA A .0 cm/s    Right Dist DARREN Velocity 92.4 cm/s    Right popliteal dist sys .0 cm/s    Right popliteal prox sys .0 cm/s    Right Dist Peroneal Velocity 23.7 cm/s    Right super femoral dist sys .0 cm/s    Right super femoral mid sys .0 cm/s    Right super femoral prox sys .0 cm/s    Right Prox PFA A .0 cm/s    Right SFA Prox Bandar Ratio 1.2     Right SFA Mid Bandar Ratio 0.5     Right SFA Dist Bandar Ratio 1.11     Right Pop A Prox Bandar Ratio 2.58     Left SFA Prox Bandar Ratio 0.89     Left SFA Mid Bandar Ratio 0.59     Left SFA Dist Bandar Ratio 1.06     Left Pop A Prox Bandar Ratio 1.91     Left CFA dist sys .0 cm/s    Right CFA dist sys .0 cm/s   GLUCOSE, POC    Collection Time: 03/07/22 12:16 PM   Result Value Ref Range    Glucose (POC) 269 (H) 65 - 117 mg/dL    Performed by Kiana Tobar, POC    Collection Time: 03/07/22  3:43 PM   Result Value Ref Range    Glucose (POC) 292 (H) 65 - 117 mg/dL    Performed by Janeen Causey    GLUCOSE, POC    Collection Time: 03/07/22  8:28 PM   Result Value Ref Range    Glucose (POC) 317 (H) 65 - 117 mg/dL    Performed by [employer]    GLUCOSE, POC    Collection Time: 03/07/22 11:00 PM   Result Value Ref Range    Glucose (POC) 304 (H) 65 - 117 mg/dL    Performed by Lola Ego    GLUCOSE, POC    Collection Time: 03/08/22  2:25 AM   Result Value Ref Range    Glucose (POC) 239 (H) 65 - 117 mg/dL    Performed by [employer]    GLUCOSE, POC    Collection Time: 03/08/22  6:01 AM   Result Value Ref Range    Glucose (POC) 251 (H) 65 - 117 mg/dL    Performed by [employer]    PROTHROMBIN TIME + INR    Collection Time: 03/08/22  7:40 AM   Result Value Ref Range    Prothrombin time 16.9 (H) 11.9 - 14.6 sec    INR 1.4 (H) 0.9 - 1.1     HEPATIC FUNCTION PANEL    Collection Time: 03/08/22  7:40 AM   Result Value Ref Range    Protein, total 6.4 6.4 - 8.2 g/dL    Albumin 1.8 (L) 3.5 - 5.0 g/dL    Globulin 4.6 (H) 2.0 - 4.0 g/dL    A-G Ratio 0.4 (L) 1.1 - 2.2      Bilirubin, total 0.2 0.2 - 1.0 mg/dL    Bilirubin, direct <0.1 0.0 - 0.2 mg/dL    Alk.  phosphatase 92 45 - 117 U/L    AST (SGOT) 27 15 - 37 U/L    ALT (SGPT) 98 (H) 12 - 78 U/L   CBC WITH AUTOMATED DIFF    Collection Time: 03/08/22  7:40 AM   Result Value Ref Range    WBC 11.8 (H) 3.6 - 11.0 K/uL    RBC 2.67 (L) 3.80 - 5.20 M/uL    HGB 7.7 (L) 11.5 - 16.0 g/dL    HCT 24.5 (L) 35.0 - 47.0 %    MCV 91.8 80.0 - 99.0 FL    MCH 28.8 26.0 - 34.0 PG    MCHC 31.4 30.0 - 36.5 g/dL    RDW 15.9 (H) 11.5 - 14.5 %    PLATELET 761 (H) 417 - 400 K/uL    MPV 11.2 8.9 - 12.9 FL    NRBC 0.0 0.0  WBC    ABSOLUTE NRBC 0.00 0.00 - 0.01 K/uL    NEUTROPHILS 74 32 - 75 %    LYMPHOCYTES 19 12 - 49 %    MONOCYTES 4 (L) 5 - 13 %    EOSINOPHILS 1 0 - 7 %    BASOPHILS 1 0 - 1 %    IMMATURE GRANULOCYTES 1 (H) 0 - 0.5 %    ABS. NEUTROPHILS 8.9 (H) 1.8 - 8.0 K/UL    ABS. LYMPHOCYTES 2.2 0.8 - 3.5 K/UL    ABS. MONOCYTES 0.5 0.0 - 1.0 K/UL    ABS. EOSINOPHILS 0.1 0.0 - 0.4 K/UL    ABS. BASOPHILS 0.1 0.0 - 0.1 K/UL    ABS. IMM. GRANS. 0.1 (H) 0.00 - 0.04 K/UL    DF AUTOMATED     METABOLIC PANEL, BASIC    Collection Time: 03/08/22  7:40 AM   Result Value Ref Range    Sodium 148 (H) 136 - 145 mmol/L    Potassium 4.8 3.5 - 5.1 mmol/L    Chloride 116 (H) 97 - 108 mmol/L    CO2 25 21 - 32 mmol/L    Anion gap 7 5 - 15 mmol/L    Glucose 280 (H) 65 - 100 mg/dL    BUN 32 (H) 6 - 20 mg/dL    Creatinine 1.43 (H) 0.55 - 1.02 mg/dL    BUN/Creatinine ratio 22 (H) 12 - 20      GFR est AA 43 (L) >60 ml/min/1.73m2    GFR est non-AA 36 (L) >60 ml/min/1.73m2    Calcium 8.4 (L) 8.5 - 10.1 mg/dL     Additional comments: MRI of the brain shows right paracentral lobule infarct, Ct head with left FREDO stroke  NEUROLOGICAL EXAM:  Appearance: The patient is well developed, opens eyes, not following commands or talking   Mental Status:  She is more awake and alert. Cranial Nerves:   Intact visual fields. GUMARO, EOM's full, no nystagmus, no ptosis. Facial movement is asymmetric with moderate flattening of the right nasolabial fold. Motor:   Moves all extremities to command may be right side slightly weaker than the right. Reflexes:   Deep tendon reflexes not checked. Sensory:   Normal to touch, pinprick. Gait:   Cannot be checked. Tremor:   No tremor noted. Cerebellar:   Could not be checked. Neurovascular:  Normal heart sounds and regular rhythm     Assessment:   1. Acute infarction left paracentral lobule and region of the white matter of the left motor cortex: This is the cause of her new right-sided weakness. 2.  Lethargy/encephalopathy: She is better, still slow in her responses. She has diabetic neuropathy which causes neuropathic pain.   She is supposed to be taking gabapentin which has been on hold due to sedation  3. Hypertension. 4.  Diabetes mellitus  5: Fevers: treatment for UTI  Plan:   1.  Gabapentin is on hold for now. Once she starts becoming more awake we'll start at 100 mg 3 times daily. 2.  Please give Ativan 1 mg only if she goes into a grand mal seizure lasting for more than 3 minutes. 3.  Her CT scan of the head without contrast showed a left FREDO distribution older looking infarct, but newer than the last MRI of a week ago. Patient probably will need JUS to further evaluate for possible embolic source. 4. Awaiting urology recs if and when anti plts can be re-started.    5. BP goal < 150/90    Thank you,    Signed:  Sanna Dale MD  3/8/2022  11:46 AM

## 2022-03-09 NOTE — PROGRESS NOTES
Attempted speech language tx. PT/OT about to enter room for tx. Will f/u later today as time permits.

## 2022-03-09 NOTE — PROGRESS NOTES
OT tx session attempted at [phone], however pt sleeping, opened eyes, and expressed she would like therapy to return tomorrow. Will continue to follow pt and attempt tx session at a later time as time allows. Thank you.

## 2022-03-09 NOTE — PROGRESS NOTES
Renal Daily Progress Note    Admit Date: 2/21/2022      Subjective:     Seen and examined. Opened her eyes  daughter was at bedside.   Reports that she is having diarrhea With current tube feeds and the tube feeds need to be changed   Clinical events noted-CBI is stopped now  Current Facility-Administered Medications   Medication Dose Route Frequency    psyllium husk-aspartame (METAMUCIL FIBER) packet 1 Packet  1 Packet Per G Tube BID    artificial saliva (MOUTH KOTE) 1 Spray  1 Spray Oral PRN    [START ON 3/10/2022] acidophilus-pectin, citrus tablet 2 Tablet  2 Tablet Oral DAILY    piperacillin-tazobactam (ZOSYN) 3.375 g in 0.9% sodium chloride (MBP/ADV) 100 mL MBP  3.375 g IntraVENous Q8H    aspirin chewable tablet 81 mg  81 mg Oral DAILY    benzocaine-zinc cl-benzalkonium cl (ORAJEL) 20-0.1-0.02 % mucosal gel   Oral BID    amLODIPine (NORVASC) tablet 5 mg  5 mg Oral BID    metoprolol succinate (TOPROL-XL) XL tablet 50 mg  50 mg Oral BID    insulin glargine (LANTUS) injection 25 Units  25 Units SubCUTAneous QHS    acetaminophen (TYLENOL) solution 650 mg  650 mg Per G Tube Q4H PRN    gabapentin (NEURONTIN) capsule 300 mg  300 mg Oral BID    0.45% sodium chloride infusion  25 mL/hr IntraVENous CONTINUOUS    0.9% sodium chloride infusion 250 mL  250 mL IntraVENous PRN    insulin lispro (HUMALOG) injection   SubCUTAneous Q4H    latanoprost (XALATAN) 0.005 % ophthalmic solution 1 Drop  1 Drop Right Eye QPM    0.9% sodium chloride infusion 250 mL  250 mL IntraVENous PRN    zinc oxide-white petrolatum 20-75 % topical paste   Topical BID    lidocaine (XYLOCAINE) 4 % (40 mg/mL) topical solution   Topical PRN    [Held by provider] atorvastatin (LIPITOR) tablet 80 mg  80 mg Oral DAILY    labetaloL (NORMODYNE;TRANDATE) 20 mg/4 mL (5 mg/mL) injection 10 mg  10 mg IntraVENous Q6H PRN    sodium chloride (NS) flush 5-40 mL  5-40 mL IntraVENous PRN    brimonidine (ALPHAGAN) 0.2 % ophthalmic solution 1 Drop  1 Drop Both Eyes TID    ferrous sulfate tablet 325 mg  325 mg Oral BID    glucose chewable tablet 16 g  4 Tablet Oral PRN    glucagon (GLUCAGEN) injection 1 mg  1 mg IntraMUSCular PRN    dextrose 10% infusion 125-250 mL  125-250 mL IntraVENous PRN        Review of Systems    Review of Systems   Unable to perform ROS: Medical condition          Objective:     Patient Vitals for the past 8 hrs:   BP Temp Pulse Resp SpO2 Height   03/09/22 0928 -- -- -- -- -- 5' 7\" (1.702 m)   03/09/22 0859 (!) 161/62 98 °F (36.7 °C) 64 18 97 % --     03/09 0701 - 03/09 1900  In: 600   Out: -   03/07 1901 - 03/09 0700  In: 71790   Out: 09055 [Urine:87921]    Physical Exam:   Physical Exam  Cardiovascular:      Rate and Rhythm: Regular rhythm. Pulmonary:      Breath sounds: Normal breath sounds. Abdominal:      General: Bowel sounds are normal.      Palpations: Abdomen is soft. Comments: PEG tube in place     Trace edema  She is nonverbal      DUPLEX LOWER EXT ARTERY BILAT   Final Result      ANKLE BRACHIAL INDEX   Final Result      CT HEAD WO CONT   Final Result   1. Evolving left FREDO territory ischemic infarct. No acute hemorrhage. 2.  Paranasal sinus disease. XR FOOT LT AP/LAT   Final Result      DUPLEX UPPER EXT VENOUS RIGHT   Final Result      US ABD COMP   Final Result   Gallbladder sludge without other acute abnormality. XR CHEST PORT   Final Result   The cardiomediastinal silhouette is appropriate for age, technique,   and lung expansion. Pulmonary vasculature is not congested. The lungs are   essentially clear. No effusion or pneumothorax is seen. CT HEAD WO CONT   Final Result   Negative for acute intracranial process. CT ABD PELV WO CONT   Final Result   Hemorrhage within the urinary bladder. Mild left   hydroureteronephrosis. XR SWALLOW FUNC VIDEO   Final Result   Penetration with thin consistency. No aspiration.          CTA HEAD NECK W WO CONT   Final Result   Negative head and neck CTA.         Please note that all carotid bifurcation stenoses are measured as per NASCET   criteria. CT CODE NEURO HEAD WO CONTRAST   Final Result   1. No acute large vessel intracranial ischemia, hemorrhage. Called report to   patient's nurse Robert Bryant at 7:12 PM 2/22/2022.   2. Right basal ganglia lacunar infarct. 3. Periventricular microangiopathy. 4. Pansinusitis. See above. MRI BRAIN WO CONT   Final Result   Acute infarction left paracentral lobule and region of the white   matter of the left motor cortex. CT CODE NEURO HEAD WO CONTRAST   Final Result      1. No acute intracranial hemorrhage. 2. Left basal ganglia lacunar infarct, which is age indeterminate in the absence   of any prior outside studies, but favored to be chronic. 3. Moderate to severe paranasal sinus disease. Results called to Dr. Makenzie Avalos at 9:39 PM on 2/21/2022           Data Review   Recent Labs     03/08/22  0740 03/07/22  0738   WBC 11.8* 12.8*   HGB 7.7* 7.9*   HCT 24.5* 26.0*   * 398     Recent Labs     03/09/22  0632 03/08/22  0740 03/07/22  0738   NA  --  148* 147*   K  --  4.8 4.8   CL  --  116* 119*   CO2  --  25 24   GLU  --  280* 270*   BUN  --  32* 30*   CREA  --  1.43* 1.65*   CA  --  8.4* 8.4*   ALB 1.9* 1.8* 1.8*   ALT 80* 98* 122*   INR 1.5* 1.4* 1.4*     No components found for: GLPOC  No results for input(s): PH, PCO2, PO2, HCO3, FIO2 in the last 72 hours. Recent Labs     03/09/22  0632 03/08/22  0740 03/07/22  0738   INR 1.5* 1.4* 1.4*         Assessment:           Active Problems:    CVA (cerebral vascular accident) ([facility] [address] (2/21/2022)      Elevated troponin (2/21/2022)      1. Acute kidney injury,-resolved  2. CKD  3. CVA-neuro  4. Hematuria on CBI  5. Hypertension  6. Severe malnutrition  7. History of hypertension  8. UTI E. Coli  9. Hyperglycemia  10.  Hypernatremia          Plan:             Track urine output  Track serum sodium  Agree with water flushes  Requested for dietitian to speak to daughter. Explained to her that some amount of  is loose bowel movements expected with tube feeds.   Also explained to her the dietitian has added fiber and probiotics but this did not reassure her

## 2022-03-09 NOTE — WOUND CARE
IP WOUND CONSULT    Carlee Lee  MEDICAL RECORD NUMBER:  [id number]  AGE: 78 y.o. GENDER: female  : 1945  TODAY'S DATE:  3/9/2022    GENERAL     [x] Follow-up   [x] New Consult    Carlee Lee is a 78 y.o. female referred by:   [x] Physician  [] Nursing  [] Other:         PAST MEDICAL HISTORY    Past Medical History:   Diagnosis Date    Diabetes mellitus ([facility] [address]     HTN (hypertension)     Hyperlipidemia     Neuropathy     PAD (peripheral artery disease) ([facility] [address]     PCI    Sciatica         PAST SURGICAL HISTORY    Past Surgical History:   Procedure Laterality Date    HX AMPUTATION Right     great toe    HX CERVICAL FUSION      HX OTHER SURGICAL Bilateral     Stent placement    HX OTHER SURGICAL      HX VASCULAR STENT Bilateral     2 in right 1 in left    HX VASCULAR STENT Bilateral        FAMILY HISTORY    Family History   Problem Relation Age of Onset    Cancer Mother     Diabetes Mother     Hypertension Mother          ALLERGIES    No Known Allergies    MEDICATIONS    No current facility-administered medications on file prior to encounter.      Current Outpatient Medications on File Prior to Encounter   Medication Sig Dispense Refill    atorvastatin (LIPITOR) 20 mg tablet TAKE 1 TABLET BY MOUTH EVERY NIGHT 30 Tablet 5    gabapentin (NEURONTIN) 300 mg capsule TAKE 2 CAPSULES BY MOUTH THREE TIMES DAILY WITH MEALS 180 Capsule 2    Blood-Glucose Meter (OneTouch Verio Flex meter) misc Test blood glucose 3 time daily Dx Code: E11.65 1 Each 0    glucose blood VI test strips (OneTouch Verio test strips) strip Test blood glucose 3 time daily Dx Code: E11.65 300 Strip 3    lancets (OneTouch Delica Plus Lancet) 33 gauge misc Test blood glucose 3 time daily Dx Code: E11.65 300 Lancet 3    BD [place] 2nd Gen Pen Needle 32 gauge x \" ndle USE TO INJECT LANTUS AND VICTOZA DAILY 200 Pen Needle 4    insulin glargine (Lantus Solostar U-100 Insulin) 100 unit/mL (3 mL) inpn 22 Units by SubCUTAneous route daily. 30 mL 2    liraglutide (Victoza 3-Bandar) 0.6 mg/0.1 mL (18 mg/3 mL) pnij ADMINISTER 1.8 MG UNDER THE SKIN DAILY. 27 mL 3    dilTIAZem ER (CARDIZEM CD) 180 mg capsule TAKE 1 CAPSULE BY MOUTH DAILY 90 Cap 0    brimonidine (ALPHAGAN) 0.2 % ophthalmic solution Administer 1 Drop to both eyes three (3) times daily.  aspirin delayed-release 81 mg tablet Take  by mouth daily.  metFORMIN (GLUCOPHAGE) 1,000 mg tablet TAKE 1/2 TABLET BY MOUTH TWICE DAILY WITH MEALS 180 Tab 1    losartan (COZAAR) 50 mg tablet TK 2 TS PO QD IN THE EVENING  1    VICTOZA 3-BANDAR 0.6 mg/0.1 mL (18 mg/3 mL) pnij ADMINISTER 1.8 MG UNDER THE SKIN EVERY MORNING 9 mL 2    clopidogrel (PLAVIX) 75 mg tab Take 75 mg by mouth daily.  CHELY PEN NEEDLE 32 gauge x 5/32\" ndle USE TO INJECT LANTUS AND VICTOZA EVERY  Pen Needle 11    ferrous sulfate 325 mg (65 mg iron) tablet Take 1 Tab by mouth two (2) times a day. 60 Tab 5    polyethylene glycol (MIRALAX) 17 gram packet Take 1 Packet by mouth daily. 30 Packet 1    oxyCODONE IR (ROXICODONE) 5 mg immediate release tablet Take 5 mg by mouth every six (6) hours as needed for Pain.  HYDROcodone-acetaminophen (NORCO) 5-325 mg per tablet Take  by mouth. [unfilled]  Visit Vitals  BP (!) 161/62 (BP 1 Location: Left upper arm, BP Patient Position: At rest)   Pulse 64   Temp 98 °F (36.7 °C)   Resp 18   Ht 5' 7\" (1.702 m)   Wt 82.2 kg (181 lb 3.5 oz)   SpO2 97%   BMI 28.38 kg/m²       ASSESSMENT     Wound Identification & Type: Stage 2 PI to left buttock; stage 2 PI to right buttock; stage 2 PI to coccyx; dry gangrene to left 1st toe  Dressing change: Yes  Verbal consent for picture: Yes per daughter    Contributing Factors: anticoagulation therapy, diabetes, poor glucose control, chronic pressure, decreased mobility, shear force, incontinence of stool, incontinence of urine and arterial insufficiency    Wound Toe (Comment  which one) Anterior; Left Toe nail is greenish yellow with serosanguineous fluid 02/22/22 (Active)   Wound Image    03/09/22 1624   Wound Etiology Other (Comment) 03/09/22 1624   Dressing Status Clean;Dry; Intact 03/06/22 1054   Cleansed Betadine/Povidone Iodine 03/06/22 1054   Dressing/Treatment Open to air 03/09/22 1624   Wound Assessment Eschar dry 03/09/22 1624   Drainage Amount None 03/09/22 1624   Wound Odor None 03/02/22 1123   Licha-Wound/Incision Assessment Dry/flaky 03/09/22 1624   Number of days: 15       Wound Toe (Comment  which one) Anterior;Right Greater toe amputated 02/22/22 (Active)   Wound Etiology Other (Comment) 03/02/22 1123   Dressing Status Clean;Dry; Intact 03/06/22 1054   Dressing/Treatment Open to air 03/02/22 1123   Number of days: 15       Wound Buttocks 02/23/22 (Active)   Wound Image    03/09/22 1633   Wound Etiology Pressure Stage 2 03/09/22 1633   Dressing Status New dressing applied 03/09/22 1633   Cleansed Other (Comment) 03/09/22 1633   Dressing/Treatment Honey gel/honey paste; Foam 03/09/22 1633   Dressing Change Due 03/10/22 03/09/22 1633   Wound Assessment Pink/red;Non-blanchable erythema 03/09/22 1633   Drainage Amount None 03/09/22 1633   Wound Odor None 03/09/22 1633   Licha-Wound/Incision Assessment Fragile 03/09/22 1633   Edges Undefined edges 03/09/22 1633   Wound Thickness Description Partial thickness 03/09/22 1633   Number of days: 14       Wound Heel Left dark area (Active)   Wound Image   03/09/22 1638   Wound Etiology Other (Comment) 03/09/22 1638   Dressing/Treatment Open to air 03/09/22 1638   Offloading for Diabetic Foot Ulcers Offloading boot 03/09/22 1638   Wound Assessment Dry 03/09/22 1638   Drainage Amount None 03/09/22 1638   Wound Odor None 03/09/22 1638   Licha-Wound/Incision Assessment Dry/flaky 03/09/22 1638   Number of days: 12       Wound Toe (Comment  which one) Anterior; Left black left great toe 03/03/22 (Active)   Number of days: 6       Wound Coccyx Partial Thickness 03/09/22 (Active) Wound Image   03/09/22 1636   Wound Etiology Pressure Stage 2 03/09/22 1636   Dressing Status New dressing applied 03/09/22 1636   Cleansed Other (Comment) 03/09/22 1636   Dressing/Treatment Foam;Honey gel/honey paste 03/09/22 1636   Dressing Change Due 03/10/22 03/09/22 1636   Wound Length (cm) 4.2 cm 03/09/22 1636   Wound Width (cm) 2.1 cm 03/09/22 1636   Wound Depth (cm) 0.2 cm 03/09/22 1636   Wound Surface Area (cm^2) 8.82 cm^2 03/09/22 1636   Wound Volume (cm^3) 1.764 cm^3 03/09/22 1636   Wound Assessment Pink/red;Denuded 03/09/22 1636   Drainage Amount None 03/09/22 1636   Wound Odor None 03/09/22 1636   Licha-Wound/Incision Assessment Fragile 03/09/22 1636   Edges Defined edges 03/09/22 1636   Wound Thickness Description Partial thickness 03/09/22 1636   Number of days: 0          PLAN     Skin Care & Pressure Relief Recommendations  Speciality bed P500 Low Air Loss  Minimize layers of linen  Turn/reposition approximately every 2 hours  Pillow wedges  Manage incontinence   Promote continence; Skin Protective lotion/cream to buttocks and sacrum daily and as needed with incontinence care  Offloading boots    Shakir 13  Blood Glucose: 211 on 3/9/22                             Albumin:  1.9 on 3/8/22  WBCs:  11.8 on 3/8/22    Support Surface: P500 Low Air Loss    Physician/Provider notified:   Recommendations:  Stage 2 PIs noted to both buttocks, improving and almost healed. Shear injury and non-blanchable area to left buttock. Stage 2 PI to coccyx. Apply thin layer of Therahoney to open areas and cover with Optifoam Border Sacral foam dressing daily, see dressing order. Skin color to heels is unchanged, slightly darker than surrounding skin color but intact. Dry flaky skin noted but no wounds. Maintain heel boots at all times daily while in the bed for offloading of the heels and to prevent foot drop. Dry gangrene noted to left 1st toe, being managed by Dr. Nathaniel Flynn.   Indwelling giraldo catheter in place to manage  incontinence. Patient is experiencing loose stools. Incontinent care provided and documented in flow chart. Use foam wedge to turn q2h at 30 degree angle or more to offload sacrum. Ensure patient is being turned appropriately to offload buttocks and sacrococcygeal.  Maintain HOB at 30 degrees or less, if not contraindicated (when not receiving tube feedings), to reduce pressure to buttocks and sacrum. Raise foot of bed to help prevent friction and shear injury from sliding down in the bed. Daughter Liberty Remington and another daughter present during wound assessment. All questions pertaining to wounds to buttocks and coccyx answered. Dr. Nikki Jang spoke to family regarding left 1st toe. Will continue to follow.       Discharge Wound Care Needs: TBD    Teaching completed with:   [] Patient           [] Family member       [] Caregiver          [] Nursing  [] Other    Patient/Caregiver Teaching:  Level of patient/caregiver understanding able to:   [] Indicates understanding       [] Needs reinforcement  [] Unsuccessful      [] Verbal Understanding  [] Demonstrated understanding       [] No evidence of learning  [] Refused teaching         [] N/A       Electronically signed by Asha Shah RN on 3/9/2022 at 4:41 PM

## 2022-03-09 NOTE — PROGRESS NOTES
Progress Note    Patient: Gene Tobin MRN: [id number]  SSN: [id number]    YOB: 1945  Age: 78 y.o. Sex: female      Admit Date: 2/21/2022    LOS: 12 days     Subjective:     Patient was seen and examined. Patient is followed for abnormal ERLINDA. She has a past medical history of hypertension, diabetes, neuropathy, PAD, and CVA. Condition unchanged. Patient is lying in the bed with eyes closed. Daughter states that she is sleeping more. Telemetry Review: Normal sinus rhythm; heart rate 80s, no ectopy. Review of Symptoms:   Review of Systems   Constitutional: Negative. Cardiovascular: Negative for chest pain, dyspnea on exertion, irregular heartbeat and palpitations. Respiratory: Negative for cough, shortness of breath and wheezing. Gastrointestinal: Negative for abdominal pain, constipation, nausea and vomiting. Neurological: Negative for light-headedness. Objective:     Vitals:    03/08/22 1637 03/08/22 1924 03/09/22 0859 03/09/22 0928   BP: (!) 140/66 (!) 157/64 (!) 161/62    Pulse: 88 83 64    Resp: 18  18    Temp: 97.8 °F (36.6 °C) 100.1 °F (37.8 °C) 98 °F (36.7 °C)    SpO2: 98% 97% 97%    Weight:       Height:    5' 7\" (1.702 m)        Intake and Output:  Current Shift: 03/09 0701 - 03/09 1900  In: 600   Out: -   Last three shifts: 03/07 1901 - 03/09 0700  In: 13871   Out: [address] [Urine:[id number]]    Physical Exam:   . Physical Exam  Constitutional:       General: She is not in acute distress. Appearance: Normal appearance. Cardiovascular:      Rate and Rhythm: Normal rate and regular rhythm. Pulses: Normal pulses. Heart sounds: Normal heart sounds. Pulmonary:      Effort: Pulmonary effort is normal. No respiratory distress. Breath sounds: Normal breath sounds. No stridor. No wheezing, rhonchi or rales. Abdominal:      General: Abdomen is flat. Bowel sounds are normal. There is no distension. Palpations: Abdomen is soft. Tenderness:  There is no abdominal tenderness. There is no rebound. Feet:      Left foot:      Skin integrity: Skin breakdown and dry skin present. Comments: Left   Skin:     General: Skin is warm and dry. Neurological:      Mental Status: She is lethargic. Psychiatric:         Mood and Affect: Mood normal.         Behavior: Behavior normal.           Lab/Data Review: All lab results for the last 24 hours reviewed.          Current Facility-Administered Medications:     piperacillin-tazobactam (ZOSYN) 3.375 g in 0.9% sodium chloride (MBP/ADV) 100 mL MBP, 3.375 g, IntraVENous, Q8H, Fred Barnes MD, Last Rate: 25 mL/hr at 03/09/22 1105, 3.375 g at 03/09/22 1105    aspirin chewable tablet 81 mg, 81 mg, Oral, DAILY, Sixto Lyman MD, 81 mg at 03/09/22 1106    benzocaine-zinc cl-benzalkonium cl (ORAJEL) 20-0.1-0.02 % mucosal gel, , Oral, BID, Fred Barnes MD, Given at 03/09/22 1121    amLODIPine (NORVASC) tablet 5 mg, 5 mg, Oral, BID, Cam Recinos MD, 5 mg at 03/09/22 1107    metoprolol succinate (TOPROL-XL) XL tablet 50 mg, 50 mg, Oral, BID, Cam Recinos MD, 50 mg at 03/09/22 1107    insulin glargine (LANTUS) injection 25 Units, 25 Units, SubCUTAneous, QHS, Orestes Norman MD, 25 Units at 03/08/22 2203    acetaminophen (TYLENOL) solution 650 mg, 650 mg, Per G Tube, Q4H PRN, Analia JACKSON MD, 650 mg at 03/08/22 2203    gabapentin (NEURONTIN) capsule 300 mg, 300 mg, Oral, BID, Kalpesh Lara MD, 300 mg at 03/09/22 1107    0.45% sodium chloride infusion, 25 mL/hr, IntraVENous, CONTINUOUS, Kalpesh Lara MD, Last Rate: 25 mL/hr at 03/07/22 1717, 25 mL/hr at 03/07/22 1717    0.9% sodium chloride infusion 250 mL, 250 mL, IntraVENous, PRN, Israel Soto MD    insulin lispro (HUMALOG) injection, , SubCUTAneous, Q4H, Israel Soto MD, 6 Units at 03/09/22 1235    latanoprost (XALATAN) 0.005 % ophthalmic solution 1 Drop, 1 Drop, Right Eye, QPM, Ken Estes MD, 1 Drop at 03/08/22 1727    0.9% sodium chloride infusion 250 mL, 250 mL, IntraVENous, PRN, Padma Louise MD    zinc oxide-white petrolatum 20-75 % topical paste, , Topical, BID, Orestes Norman MD, Given at 03/09/22 0900    lidocaine (XYLOCAINE) 4 % (40 mg/mL) topical solution, , Topical, PRN, Riya JACKSON MD, Given at 02/26/22 2207    [Held by provider] atorvastatin (LIPITOR) tablet 80 mg, 80 mg, Oral, DAILY, Nathan Navarro MD, 80 mg at 02/26/22 0900    labetaloL (NORMODYNE;TRANDATE) 20 mg/4 mL (5 mg/mL) injection 10 mg, 10 mg, IntraVENous, Q6H PRN, Nathan Navarro MD, 10 mg at 03/02/22 1224    sodium chloride (NS) flush 5-40 mL, 5-40 mL, IntraVENous, PRN, Orestes Steinberg MD    brimonidine (ALPHAGAN) 0.2 % ophthalmic solution 1 Drop, 1 Drop, Both Eyes, TID, Orestes Norman MD, 1 Drop at 03/09/22 0900    ferrous sulfate tablet 325 mg, 325 mg, Oral, BID, Orestes Norman MD, 325 mg at 03/09/22 1106    glucose chewable tablet 16 g, 4 Tablet, Oral, PRN, Riya JACKSON MD    glucagon (GLUCAGEN) injection 1 mg, 1 mg, IntraMUSCular, PRN, Orestes Steinberg MD    dextrose 10% infusion 125-250 mL, 125-250 mL, IntraVENous, PRN, Riya JACKSON MD      Assessment:     Active Problems:    CVA (cerebral vascular accident) ([facility] [address] (2/21/2022)      Elevated troponin (2/21/2022)        Plan:     Case discussed with Collaborating physician Dr. Carlos Pressley and our recommendations are as follows:     1. PAD:  Abnormal ERLINDA, left great toe dry gangrene. Podiatry following. Amputations noted on right foot as well. Arterial duplex showed moderate to severe disease in the bilateral SFA and popliteal arteries, occluded right posterior tibial artery and occluded left peroneal artery. Med management as able     2. Recent CVA, followed by neurology, will consider JUS if medically necessary. Will continue to follow peripherally. Thank you for involving us in the care of this patient.   Please do not hesitate to call if additional questions arise. If after hours please call [phone]. Signed By: Brody Rasmussen NP     March 9, 2022        Dr. Dayana Arce Cardiologist    [facility]  [address] [place] [address] [address], [address] [facility]  [phone]

## 2022-03-09 NOTE — PROGRESS NOTES
Comprehensive Nutrition Assessment    Type and Reason for Visit: Reassess (goals)    Nutrition Recommendations/Plan:   Modify Glucerna 1.5 to 50 ml/hr   Increase flushes to 200 ml flush q 4h       d/c Prosource 30 ml QD   = 1800 kcal, 99 protein, 2111 ml water  Consider adding probiotic  If continued Diarrhea, consider adding a soluble fiber product to TF regimen  Monitor TF jayant, weights, skin and labs. Document I/O's, BMs, and GSV      Nutrition Assessment:  Pt with CVA with right side weakness. Pt cont to remain lethargic and unable to really follow commands. slp devin 3/1- recommend cont NPO, feedings via NGT. Pt with RICARDO- renal noted creat improving, pt with multisystem organ failure. PEG tube placement 3/2. Therapeutic feeding at goal rate. Reported Diarrhea x 7 3/7 but improved 3/8 per nursing. Likely unrelated to feeding and may may be more from antibiotic. ENNs adjusted, modify feeding and flushes. She continues to work with speech as able. Meds:zosyn, atorvastatin, ferrous sulfate, insulin, toprol, gabapentin. Labs:  H/H 7.7/24.5, Na 148, K 4.8, BUN 32, creat 1.43, glucose 147, GLUC -317, Hgba1c 7.2. Malnutrition Assessment:  Malnutrition Status:  No malnutrition    Context:  Chronic illness          Estimated Daily Nutrient Needs:  Energy (kcal): 1664-1862kcal (25-28kcal/kg); Weight Used for Energy Requirements: Current  Protein (g): 99g pro(1.5g/kg); Weight Used for Protein Requirements: Current  Fluid (ml/day): 2000 ml (30ml/kg); Method Used for Fluid Requirements: ml/kg      Nutrition Related Findings:  Last BM 3/8, loose. No edema. No n/v/c. +dysphagia/Peg tube.         Wounds:    Multiple (Multiple (R great toe, buttocks stage 2, L heel dark area))       Current Nutrition Therapies:  DIET NPO  ADULT TUBE FEEDING PEG; Diabetic; Delivery Method: Continuous; Continuous Initial Rate (mL/hr): 55; Continuous Advance Tube Feeding: No; Water Flush Volume (mL): 125; Water Flush Frequency: Q 3 hours; Modulars/Additives: Protein; Specify How to Ad. .. Anthropometric Measures:  · Height:  5' 7\" (170.2 cm)  · Current Body Wt:  82.2 kg (181 lb 3.5 oz)    · Usual Body Wt:   (elkin)     · Ideal Body Wt:  135 lbs:  134.2 %   · BMI Category: Overweight (BMI 25.0-29. 9)       Nutrition Diagnosis:   · Inadequate protein-energy intake related to cognitive or neurological impairment as evidenced by nutrition support-enteral nutrition      Nutrition Interventions:   Food and/or Nutrient Delivery: Continue tube feeding  Nutrition Education and Counseling: No recommendations at this time  Coordination of Nutrition Care: Speech therapy,Continue to monitor while inpatient    Goals:  Meet >75% of ENNs. weighty maintenance, wound healing.        Nutrition Monitoring and Evaluation:   Behavioral-Environmental Outcomes: None identified  Food/Nutrient Intake Outcomes: Enteral nutrition intake/tolerance  Physical Signs/Symptoms Outcomes: Biochemical data,Nutrition focused physical findings,Skin,Weight,Chewing or swallowing    Discharge Planning:    Enteral nutrition     Electronically signed by Dima Herring RD on 3/9/2022 at 9:29 AM    Contact: [phone]

## 2022-03-09 NOTE — PROGRESS NOTES
Patient is still being followed by [facility] for acceptance. Her insurance does require auth. Auth still has to be started.           Leilani Paige, MSW

## 2022-03-09 NOTE — PROGRESS NOTES
Infectious Disease Progress Note           Subjective:   Stable gangrenous changes involving left great. Daughter at bedside. Mentions pt has been having diarrhea since  and nothing has been done about it.  Discussed w RN and she believes diarrhea more from tube feeds, metamucil recommended by dietician   Objective:   Physical Exam:     Visit Vitals  BP (!) 161/62 (BP 1 Location: Left upper arm, BP Patient Position: At rest)   Pulse 64   Temp 98 °F (36.7 °C)   Resp 18   Ht 5' 7\" (1.702 m)   Wt 181 lb 3.5 oz (82.2 kg)   SpO2 97%   BMI 28.38 kg/m²    O2 Flow Rate (L/min): 1 l/min (placed on room air) O2 Device: None (Room air)    Temp (24hrs), Av.6 °F (37 °C), Min:97.8 °F (36.6 °C), Max:100.1 °F (37.8 °C)    701 - 1900  In: 600   Out: -    1901 -  07  In: 58595   Out: 87124 [Urine:[id number]]    General: NAD, alert, non-verbal, not following commands   HEENT: GUMRAO, very dry mouth, dry mucous membranes   Lungs: CTA b/l, decreased at the bases, no wheeze/rhonchi   Heart: S1S2+, RRR, no murmur  Abdo: Soft, NT, ND, +BS   :+ indwelling giraldo cath    Exts: Ischemic changes involving left great toe   Skin: No pressure ulcer      Data Review:       Recent Days:  Recent Labs     22  0740 22  0738   WBC 11.8* 12.8*   HGB 7.7* 7.9*   HCT 24.5* 26.0*   * 398     Recent Labs     22  0740 22  0738   BUN 32* 30*   CREA 1.43* 1.65*       Lab Results   Component Value Date/Time    C-Reactive protein 8.61 (H) 2022 04:00 AM        Microbiology     Results     Procedure Component Value Units Date/Time    CULTURE, BLOOD [[id number]] Collected: 22 1016    Order Status: Completed Specimen: Blood Updated: 22 1056     Special Requests: --        No Special Requests  Left  Forearm       Culture result: No growth 4 days       CULTURE, WOUND Erby Richard STAIN [[id number]]  (Susceptibility) Collected: 22    Order Status: Completed Specimen: Wound Updated: 03/04/22 1000     Special Requests: No Special Requests        GRAM STAIN 2+ Gram Negative Rods         2+ Gram Positive Cocci        Culture result: Heavy Escherichia coli         Moderate  Mixed skin yasmine isolated       Susceptibility      Escherichia coli     GEMA     Amikacin ($) Susceptible     Ampicillin ($) Resistant     Ampicillin/sulbactam ($) Resistant     Cefazolin ($) Resistant     Cefepime ($$) Susceptible     Cefoxitin Susceptible     Ceftazidime ($) Susceptible     Ceftriaxone ($) Susceptible     Ciprofloxacin ($) Susceptible     Gentamicin ($) Resistant     Levofloxacin ($) Susceptible     Meropenem ($$) Susceptible     Piperacillin/Tazobac ($) Susceptible     Tobramycin ($) Intermediate     Trimeth/Sulfa Resistant                  Linear View                   CULTURE, BLOOD [[id number]] Collected: 02/25/22 1230    Order Status: Completed Specimen: Blood Updated: 03/03/22 0831     Special Requests: No Special Requests        Culture result: No growth 6 days                Diagnostics   CXR Results  (Last 48 hours)    None             Assessment/Plan     1. PAD w Ischemic left great toe/chronic ulcer. Stable dry gangrenous changes on exam       Daughter inquired about US results and wondered plans for toe as she hasnt been able to speak w Dr Scott Course       Seen by interventional cardiologist, no plans for aggressive measures, consult for Dr Nghia Jeffries discontinued       Afebrile, leukocytosis trending down on todays labs       Continue empiric Zosyn for now. Routine labs in the morning     2. UTI complicated by hematuria, suspect underlying neurogenic bladder from recent CVA       E. coli isolated from urine Cx on 2/23, blood Cx from 2/25 staying negative        Received appropriate course of GNR coverage     3. Positive hep C serology, Qualitative RNA neg, likely a false positive serology      4. Recently CVA, w right sided weakness, remains aphasic      5.  Dry mouth, artificial saliva ordered, RN encourage on mouth care     6. Diarrhea, has been experiencing loose stools since initiation of tube feeds       Metamucil ordered per dietician recs, will add probiotics      Currently a low suspicion for C.  Diff but will r/o no improvement      Clifton Flores MD    3/9/2022

## 2022-03-09 NOTE — PROGRESS NOTES
Progress Note    Patient: Arik Shah MRN: [id number]  SSN: [id number]    YOB: 1945  Age: 78 y.o. Sex: female      Admit Date: 2/21/2022    LOS: 12 days     Subjective:     78years old with severe with right hemiparesis recurrent, acute renal failure resolved chronic kidney failure, peripheral vascular disease, E. coli UTI, diabetes with diarrhea onset with enteral feeds. Daughter by the bedside    Objective:     Vitals:    03/09/22 0859 03/09/22 0928 03/09/22 1734 03/09/22 1742   BP: (!) 161/62  (!) 149/60    Pulse: 64  82    Resp: 18  18    Temp: 98 °F (36.7 °C)  99.8 °F (37.7 °C) 99.8 °F (37.7 °C)   SpO2: 97%  99%    Weight:       Height:  5' 7\" (1.702 m)          Intake and Output:  Current Shift: 03/09 0701 - 03/09 1900  In: 600   Out: -   Last three shifts: 03/07 1901 - 03/09 0700  In: 52445   Out: 76444 [Urine:84922]    Physical Exam:   General:  Alert,. Eyes:  Conjunctivae/corneas clear. PERRL, EOMs intact. Fundi benign   Ears:  Normal TMs and external ear canals both ears. Nose: Nares normal. Septum midline. Mucosa normal. No drainage or sinus tenderness. Mouth/Throat: Lips, mucosa, and tongue normal. Teeth and gums normal.   Neck: Supple, symmetrical, trachea midline, no adenopathy, thyroid: no enlargment/tenderness/nodules, no carotid bruit and no JVD. Back:   Symmetric, no curvature. ROM normal. No CVA tenderness. Lungs:   Clear to auscultation bilaterally. Heart:  Regular rate and rhythm, S1, S2 normal, no murmur, click, rub or gallop. Abdomen:   Soft, non-tender. Bowel sounds normal. No masses,  No organomegaly. Extremities:  Weakness on the right and left cyanosis or edema. Pulses: 2+ and symmetric all extremities. Skin: Skin color, texture, turgor normal. No rashes or lesions   Lymph nodes: Cervical, supraclavicular, and axillary nodes normal.   Neurologic: feet offloaded       Lab/Data Review: All lab results for the last 24 hours reviewed. Recent Results (from the past 24 hour(s))   GLUCOSE, POC    Collection Time: 03/08/22  6:25 PM   Result Value Ref Range    Glucose (POC) 264 (H) 65 - 117 mg/dL    Performed by Saturnino, POC    Collection Time: 03/08/22 10:18 PM   Result Value Ref Range    Glucose (POC) 277 (H) 65 - 117 mg/dL    Performed by Aravind Mathew    GLUCOSE, POC    Collection Time: 03/09/22  2:24 AM   Result Value Ref Range    Glucose (POC) 232 (H) 65 - 117 mg/dL    Performed by Rajiv Richards    GLUCOSE, POC    Collection Time: 03/09/22  6:06 AM   Result Value Ref Range    Glucose (POC) 147 (H) 65 - 117 mg/dL    Performed by Aravind Mathew    PROTHROMBIN TIME + INR    Collection Time: 03/09/22  6:32 AM   Result Value Ref Range    Prothrombin time 17.4 (H) 11.9 - 14.6 sec    INR 1.5 (H) 0.9 - 1.1     HEPATIC FUNCTION PANEL    Collection Time: 03/09/22  6:32 AM   Result Value Ref Range    Protein, total 6.6 6.4 - 8.2 g/dL    Albumin 1.9 (L) 3.5 - 5.0 g/dL    Globulin 4.7 (H) 2.0 - 4.0 g/dL    A-G Ratio 0.4 (L) 1.1 - 2.2      Bilirubin, total 0.3 0.2 - 1.0 mg/dL    Bilirubin, direct 0.1 0.0 - 0.2 mg/dL    Alk. phosphatase 83 45 - 117 U/L    AST (SGOT) 27 15 - 37 U/L    ALT (SGPT) 80 (H) 12 - 78 U/L   GLUCOSE, POC    Collection Time: 03/09/22 10:39 AM   Result Value Ref Range    Glucose (POC) 199 (H) 65 - 117 mg/dL    Performed by Lexx Izquierdo    GLUCOSE, POC    Collection Time: 03/09/22 11:50 AM   Result Value Ref Range    Glucose (POC) 211 (H) 65 - 117 mg/dL    Performed by Stevie Lopez, POC    Collection Time: 03/09/22  4:42 PM   Result Value Ref Range    Glucose (POC) 244 (H) 65 - 117 mg/dL    Performed by Lexx Izquierdo          Assessment:     Active Problems:    CVA (cerebral vascular accident) ([address] (2/21/2022)      Elevated troponin (2/21/2022)    Diarrhea possibly secondary to enteral feeds dietitian consult was obtained recommend probiotics.   We will give Imodium  Severe malnutrition  Plan: Discussed with daughter etiology management  Case management to work on discharge  Reviewed ID and nephrology notes    Signed By: Raulito Bello MD     March 9, 2022

## 2022-03-10 NOTE — CONSULTS
History and Physical    Chief complaints: Left great toe gangrene. History of Presenting Illness:  Ananth Pierson is a 78 y.o. very pleasant woman currently hospitalized with a CVA and seizure activities. I was consulted to evaluate patient's vascular status because of the left great toe with gangrene. Patient examined this morning. Patient is awake however not able to provide any history. Patient's daughter at bedside. She was providing all the history. Patient apparently had a previous multiple interventions done and the leg stent placement done. And daughter says no interventions and no surgery per patient's request prior decision. Past Medical History:   Diagnosis Date    Diabetes mellitus ([facility] [address]     HTN (hypertension)     Hyperlipidemia     Neuropathy     PAD (peripheral artery disease) (HCC)     PCI    Sciatica       Past Surgical History:   Procedure Laterality Date    HX AMPUTATION Right     great toe    HX CERVICAL FUSION      HX OTHER SURGICAL Bilateral     Stent placement    HX OTHER SURGICAL      HX VASCULAR STENT Bilateral     2 in right 1 in left    HX VASCULAR STENT Bilateral      Family History   Problem Relation Age of Onset    Cancer Mother     Diabetes Mother     Hypertension Mother       Social History     Tobacco Use    Smoking status: Never Smoker    Smokeless tobacco: Never Used   Substance Use Topics    Alcohol use: Not Currently       Prior to Admission medications    Medication Sig Start Date End Date Taking?  Authorizing Provider   atorvastatin (LIPITOR) 20 mg tablet TAKE 1 TABLET BY MOUTH EVERY NIGHT 2/3/22   Jonathan Lindquist MD   gabapentin (NEURONTIN) 300 mg capsule TAKE 2 CAPSULES BY MOUTH THREE TIMES DAILY WITH MEALS 11/9/21   Jonathan Lindquist MD   Blood-Glucose Meter (OneTouch Verio Flex meter) misc Test blood glucose 3 time daily Dx Code: E11.65 7/6/21   Jonathan Lindquist MD   glucose blood VI test strips (OneTouch Verio test strips) strip Test blood glucose 3 time daily Dx Code: E11.65 7/6/21   Misti Reina MD   lancets (OneTouch Delica Plus Lancet) 33 gauge misc Test blood glucose 3 time daily Dx Code: E11.65 7/6/21   Misti Reina MD   BD [place] 2nd Gen Pen Needle 32 gauge x 5/32\" ndle USE TO INJECT LANTUS AND VICTOZA DAILY 7/6/21   Misti Reina MD   insulin glargine (Lantus Solostar U-100 Insulin) 100 unit/mL (3 mL) inpn 22 Units by SubCUTAneous route daily. 6/18/21   Ravindra Nickerson MD   liraglutide (Victoza 3-Sheldon) 0.6 mg/0.1 mL (18 mg/3 mL) pnij ADMINISTER 1.8 MG UNDER THE SKIN DAILY. 6/18/21   Misti Reina MD   dilTIAZem ER (CARDIZEM CD) 180 mg capsule TAKE 1 CAPSULE BY MOUTH DAILY 6/24/20   Misti Reina MD   brimonidine (ALPHAGAN) 0.2 % ophthalmic solution Administer 1 Drop to both eyes three (3) times daily. Provider, Historical   aspirin delayed-release 81 mg tablet Take  by mouth daily. Provider, Historical   metFORMIN (GLUCOPHAGE) 1,000 mg tablet TAKE 1/2 TABLET BY MOUTH TWICE DAILY WITH MEALS 3/23/20   Misti Reina MD   losartan (COZAAR) 50 mg tablet TK 2 TS PO QD IN THE EVENING 8/27/19   Provider, Historical   VICTOZA 3-SHELDON 0.6 mg/0.1 mL (18 mg/3 mL) pnij ADMINISTER 1.8 MG UNDER THE SKIN EVERY MORNING 4/29/19   Misti Reina MD   clopidogrel (PLAVIX) 75 mg tab Take 75 mg by mouth daily. Provider, Historical   [place] PEN NEEDLE 32 gauge x 5/32\" ndle USE TO INJECT LANTUS AND VICTOZA EVERY DAY 3/10/18   Misti Reina MD   ferrous sulfate 325 mg (65 mg iron) tablet Take 1 Tab by mouth two (2) times a day. 12/19/17   Misti Reina MD   polyethylene glycol (MIRALAX) 17 gram packet Take 1 Packet by mouth daily. 7/18/16   Ravindra Nickerson MD   oxyCODONE IR (ROXICODONE) 5 mg immediate release tablet Take 5 mg by mouth every six (6) hours as needed for Pain. Provider, Historical   HYDROcodone-acetaminophen (NORCO) 5-325 mg per tablet Take  by mouth.     Provider, Historical     No Known Allergies     Review of Systems:  Pertinent review of systems discussed in HPI, and rest of organ systems personally reviewed and they are negative. Objective:   Vital signs reviewed:      Visit Vitals  BP (!) 129/47 (BP 1 Location: Left lower arm, BP Patient Position: At rest;Supine)   Pulse 81   Temp 99.1 °F (37.3 °C)   Resp 19   Ht 5' 7\" (1.702 m)   Wt 181 lb 3.5 oz (82.2 kg)   SpO2 96%   BMI 28.38 kg/m²       Physical Exam:   General appearance:   Patient is nonresponsive. Head and neck atraumatic normocephalic. ENT shows normal oral mucosa, no jaundice no hoarse voice. Eyes: Pupil equal gaze appropriate. Cardiac system regular rate rhythm. Pulmonary: No audible wheeze. Chest wall: Chest wall excursion normal with respiration cycle, there is no deformity or chest trauma. Abdomen: Soft not tender or distended, bowel sounds active. There is no obvious palpable mass, or hernia. Neurologic: Unable to assess  Cranial nerves intact, no new focal findings. Musculoskeletal system: Motor function normal limits, motor function 5 out of 5, range of motion normal in all 4 extremity  Skin: Warm and moist.  Hematologic system: No obvious bruising. Psychosocial: Unable to assess. Vascular examination: Patient has palpable femoral pulses. Nonpalpable pedal pulse noted. Dried up great toe gangrene on the left side. Data Review: Labs are reviewed.  Discussed  Recent Results (from the past 24 hour(s))   GLUCOSE, POC    Collection Time: 03/09/22 10:39 AM   Result Value Ref Range    Glucose (POC) 199 (H) 65 - 117 mg/dL    Performed by Kerrie Polanco    GLUCOSE, POC    Collection Time: 03/09/22 11:50 AM   Result Value Ref Range    Glucose (POC) 211 (H) 65 - 117 mg/dL    Performed by Stevie Lopez, POC    Collection Time: 03/09/22  4:42 PM   Result Value Ref Range    Glucose (POC) 244 (H) 65 - 117 mg/dL    Performed by Mena Daniels Dr, POC    Collection Time: 03/10/22  7:50 AM   Result Value Ref Range    Glucose (POC) 370 (H) 65 - 117 mg/dL    Performed by Simi Rudd reviewed: discussed as below. No name on file. Assessment:     Active Problems:    CVA (cerebral vascular accident) ([address] (2/21/2022)      Elevated troponin (2/21/2022)        Plan:     Patient's family do not want any major vascular work-up to be done on her. Had ERLINDA examination done is shows right and left ABIs is 0.53  Recommend apply iodine swabs on the gangrenous toe area daily. Again family do not want any major interventions to be done due to other comorbid conditions with stroke. I will follow this patient as as needed basis. I thank you for consultation and let me participate with the care of this patient.

## 2022-03-10 NOTE — PROGRESS NOTES
Problem: Patient Education: Go to Patient Education Activity  Goal: Patient/Family Education  Outcome: Progressing Towards Goal     Problem: Falls - Risk of  Goal: *Absence of Falls  Description: Document Raúl Tas Fall Risk and appropriate interventions in the flowsheet.   Outcome: Progressing Towards Goal  Note: Fall Risk Interventions:       Mentation Interventions: Adequate sleep, hydration, pain control    Medication Interventions: Bed/chair exit alarm    Elimination Interventions: Bed/chair exit alarm    History of Falls Interventions: Bed/chair exit alarm         Problem: Patient Education: Go to Patient Education Activity  Goal: Patient/Family Education  Outcome: Progressing Towards Goal     Problem: Patient Education: Go to Patient Education Activity  Goal: Patient/Family Education  Outcome: Progressing Towards Goal     Problem: TIA/CVA Stroke: Day 2 Until Discharge  Goal: Off Pathway (Use only if patient is Off Pathway)  Outcome: Progressing Towards Goal  Goal: Activity/Safety  Outcome: Progressing Towards Goal  Goal: Diagnostic Test/Procedures  Outcome: Progressing Towards Goal  Goal: Nutrition/Diet  Outcome: Progressing Towards Goal  Goal: Discharge Planning  Outcome: Progressing Towards Goal  Goal: Medications  Outcome: Progressing Towards Goal  Goal: Respiratory  Outcome: Progressing Towards Goal  Goal: Treatments/Interventions/Procedures  Outcome: Progressing Towards Goal  Goal: Psychosocial  Outcome: Progressing Towards Goal  Goal: *Verbalizes anxiety and depression are reduced or absent  Outcome: Progressing Towards Goal  Goal: *Absence of aspiration  Outcome: Progressing Towards Goal  Goal: *Absence of deep venous thrombosis signs and symptoms(Stroke Metric)  Outcome: Progressing Towards Goal  Goal: *Optimal pain control at patient's stated goal  Outcome: Progressing Towards Goal  Goal: *Tolerating diet  Outcome: Progressing Towards Goal  Goal: *Ability to perform ADLs and demonstrates progressive mobility and function  Outcome: Progressing Towards Goal  Goal: *Stroke education continued(Stroke Metric)  Outcome: Progressing Towards Goal     Problem: Ischemic Stroke: Discharge Outcomes  Goal: *Hemodynamically stable  Outcome: Progressing Towards Goal     Problem: Ischemic Stroke: Discharge Outcomes  Goal: *Absence of aspiration pneumonia  Outcome: Progressing Towards Goal     Problem: Ischemic Stroke: Discharge Outcomes  Goal: *Tolerating diet  Outcome: Progressing Towards Goal     Problem: Pressure Injury - Risk of  Goal: *Prevention of pressure injury  Description: Document Shakir Scale and appropriate interventions in the flowsheet.   Outcome: Progressing Towards Goal

## 2022-03-10 NOTE — PROGRESS NOTES
Patient bs is 370 called the attending provider. Standing order is anything over 350 to call provider. Provider stated to give patient 10units of humalog.

## 2022-03-10 NOTE — PROGRESS NOTES
SPEECH LANGUAGE PATHOLOGY DYSPHAGIA AND LANGUAGE TREATMENT  Patient: Cassandra Nina (12 y.o. female)  Date: 3/10/2022  Diagnosis: CVA (cerebral vascular accident) ([facility] [address] [I63.9]  Elevated troponin [R77.8] <principal problem not specified>  Procedure(s) (LRB):  PERCUTANEOUS ENDOSCOPIC GASTROSTOMY TUBE INSERTION (N/A)  ESOPHAGOGASTRODUODENOSCOPY (EGD) (N/A) 8 Days Post-Op  Precautions:      ASSESSMENT :  Patient is alert and receptive to treatment. Sw tx: oral hygiene is poor w/ thick dried secretions throughout oral cavity, extensive oral care provided and removed thick secretions from posterior oral cavity and palate. Saliva substitute and mouth moisturizer applied and educated PCT and RN on importance of oral hygiene. Limited PO trials of ice chips x2 and 1/2 tsp moderately thick liquids. Patient w/ reduced labial seal and poor bolus manipulation w/ oral groping and absent A-P. Pharyngeal phase is absent. PO trials suctioned. ST tx: 100% accuracy w/ 1 step commands, 70% accuracy w/ simple 2 step commands. Spontaneous speech of social greetings. Patient w/ repetition of single phoneme unable to repeat single syllable words or perform automatic speech tasks. Patient w/ 100% accuracy of object ID. Rec cont use of communication board. Patient cooperative and motivated during treatment. Education provided to daughter. PLAN :  Recommendations and Planned Interventions:  Rec cont NPO w/ TF via PEG. Rec cont speech language tx. Rec use of communication board for basic wants/needs. Frequency/Duration: Patient will be followed by speech-language pathology 5 times a week to address goals. Discharge Recommendations: IRF vs SNF pending progress     SUBJECTIVE:   Daughter reports concerns for oral care.     OBJECTIVE:     Past Medical History:   Diagnosis Date    Diabetes mellitus ([facility] [address]     HTN (hypertension)     Hyperlipidemia     Neuropathy     PAD (peripheral artery disease) (HCC)     PCI    Sciatica CXR Results  (Last 48 hours)      None            Current Diet:  DIET NPO  DIET ADULT TUBE FEEDING     Cognitive and Communication Status:  Neurologic State: Lethargic  Orientation Level: Unable to verbalize  Cognition: Unable to assess (comment)  Perception: Appears intact  Perseveration: No perseveration noted  Safety/Judgement: Decreased insight into deficits         Pain:  Pain Scale 1: Numeric (0 - 10)  Pain Intensity 1: 0       After treatment:   Patient left in no apparent distress in bed, Call bell within reach, Nursing notified, Caregiver / family present, and Bed / chair alarm activated    COMMUNICATION/EDUCATION:   Daughter receptive of education regarding ST POC, oral care, and speech strategies. The patient's plan of care including recommendations, planned interventions, and recommended diet changes were discussed with: Registered nurse, Physician, and nursing supervisor . Patient/family agree to work toward stated goals and plan of care. Problem: Dysphagia (Adult)  Goal: *Acute Goals and Plan of Care (Insert Text)  Description: Speech Therapy Swallow Goals  Initiated 2/22/2022  -Patient will tolerate easy to chew diet with thin liquids without clinical indicators of aspiration given minimal cues within 7 day(s). [NOT MET]        -Patient will tolerate PO trials without clinical indicators of aspiration given min cues within 7 day(s). [PROGRESSING]      -Patient will participate in modified barium swallow study within 7 day(s). [MET]    -Participate in speech-language evaluation as indicated      -Patient will demonstrate understanding of swallow safety precautions and aspiration precautions, diet recs with minimal cues within 7 day(s).            Outcome: Progressing Towards Goal     Problem: Communication Impaired (Adult)  Goal: *Acute Goals and Plan of Care (Insert Text)  Description: -Patient will approximate name with mod cues within 7 days.   -Patient will perform automatic speech tasks with 90% accuracy with mod cues within 7 days.   -Patient will follow 2 step commands w/ min cues w/ 90% accuracy within 7 days.   -Patient will utilize communication board w/ a field of 2-4 with 90% accuracy w/ min cues within 7 days.   -Patient will answer basic yes/no questions w/ 90% accuracy w/ min cues within 7 days.      Outcome: Progressing Towards Goal     Thank you for this referral.  Keon Nobles M.S., M.Ed., CCC-SLP  Time Calculation: 32 mins

## 2022-03-10 NOTE — PROGRESS NOTES
Problem: Mobility Impaired (Adult and Pediatric)  Goal: *Acute Goals and Plan of Care (Insert Text)  Description: Revised 3/4/22  Pt will be I with LE HEP in 7 days. Pt will perform bed mobility with min Ax1 in 7 days. Pt will perform transfers with min Ax1 in 7 days. Pt will amb 5-10 feet with LRAD safely with min Ax1 in 7 days. Pt will demonstrate improvement in dynamic seated and standing balance with min A in 7 days. Outcome: Progressing Towards Goal  PHYSICAL THERAPY TREATMENT  Patient: Misha Joe (49 y.o. female)  Date: 3/10/2022  Diagnosis: CVA (cerebral vascular accident) ([facility] [address] [I63.9]  Elevated troponin [R77.8] <principal problem not specified>  Procedure(s) (LRB):  PERCUTANEOUS ENDOSCOPIC GASTROSTOMY TUBE INSERTION (N/A)  ESOPHAGOGASTRODUODENOSCOPY (EGD) (N/A) 8 Days Post-Op  Precautions:    Chart, physical therapy assessment, plan of care and goals were reviewed. ASSESSMENT  Patient continues with skilled PT services and is progressing towards goals. Pt. Supine upon entry and required constant VC and TC to remain alert. Pt. Required max A x2 for rolling to complete sanitation and change linen. Pt. Required max A x2 to come sit EOB and attempt activities. Pt. Unable to maintain alertness, falling asleep frequently while EOB. PTA completed PROM for knee extension and flexion, no muscle activation noted. Pt. unable to follow commands but would respond by squeezing therapists' hands occasionally. Pt returned back to supine with max A x2, where she remained with all needs in reach. Current Level of Function Impacting Discharge (mobility/balance): Max A    Other factors to consider for discharge: PLOF, home setup, Assistance         PLAN :  Patient continues to benefit from skilled intervention to address the above impairments. Continue treatment per established plan of care. to address goals.     Recommendation for discharge: (in order for the patient to meet his/her long term goals)  Elie Patel    This discharge recommendation:  Has been made in collaboration with the attending provider and/or case management    IF patient discharges home will need the following DME: to be determined (TBD)       SUBJECTIVE:   Patient laughed at comments made by [address] Patient doesn't verbalize. OBJECTIVE DATA SUMMARY:   Critical Behavior:  Neurologic State: Lethargic  Orientation Level: Unable to verbalize  Cognition: Unable to assess (comment)  Safety/Judgement: Decreased insight into deficits  Functional Mobility Training:  Bed Mobility:  Rolling: Total assistance;Assist x2  Supine to Sit: Total assistance;Assist x2  Sit to Supine: Total assistance;Assist x2  Scooting: Total assistance;Assist x2  Balance:  Sitting: Impaired; With support  Sitting - Static: Poor (constant support)  Sitting - Dynamic: Poor (constant support)  Pain Rating:  Unable to verbalize, but did not grimace if pain was present    Activity Tolerance:   Poor  Please refer to the flowsheet for vital signs taken during this treatment. After treatment patient left in no apparent distress:   Supine in bed, Call bell within reach, and Caregiver / family present    COMMUNICATION/COLLABORATION:   The patients plan of care was discussed with: Occupational therapy assistant and Registered nurse. [place] completed for patient tolerance, and safety.       Ashok Nj., PTA   Time Calculation: 40 mins

## 2022-03-10 NOTE — PROGRESS NOTES
Bossman Ott NP for Urology was called about the patient's giraldo. Urologist placed order to discontinue giraldo and observe if patient will void. If patient does not void by 8pm this evening patient will need to be bladder scanned. If bladder is holding urine greater than 300ml patient giraldo will need to be replaced for retention. Urologist will place all orders.

## 2022-03-10 NOTE — PROGRESS NOTES
OCCUPATIONAL THERAPY TREATMENT  Patient: Janell Garcia (20 y.o. female)  Date: 3/10/2022  Diagnosis: CVA (cerebral vascular accident) ([facility] [address] [I63.9]  Elevated troponin [R77.8] <principal problem not specified>  Procedure(s) (LRB):  PERCUTANEOUS ENDOSCOPIC GASTROSTOMY TUBE INSERTION (N/A)  ESOPHAGOGASTRODUODENOSCOPY (EGD) (N/A) 8 Days Post-Op  Precautions:    Chart, occupational therapy assessment, plan of care, and goals were reviewed. ASSESSMENT  Patient continues with skilled OT services and is progressing towards goals. Upon TRIANA/PTA arrival, pt semi supine in bed and agreeable to tx session with daughter at bedside. Pt noted to be soiled upon arrival, rolling completed with total A x2 and bowel hygiene completed with total A. Supine>sit completed with total A x2 and pt able to sit at EOB for ~5 minutes with MaxA sitting balance. Pt required max tactile and verbal cueing to stay awake at EOB and while in supine throughout session. Pt only able to squeeze hand occasionally when commanded, unable to follow commands throughout session. Face washing completed at EOB with MaxA, pt minimally assisting throughout. Pt returned to supine with total A x2 and scooted to [facility]. Pt left semi supine in bed with wedge under L side for pressure relief, daughter at bedside, and call bell within reach. Will continue to follow pt throughout remainder of stay and progress towards OT goals. Recommending SNF at discharge when medically appropriate. Current Level of Function Impacting Discharge (ADLs): Max-total A x2 bed mobility, MaxA grooming, total A toileting    Other factors to consider for discharge: home support, PLOF, severity of deficits         PLAN :  Patient continues to benefit from skilled intervention to address the above impairments. Continue treatment per established plan of care. to address goals.     Recommendation for discharge: (in order for the patient to meet his/her long term goals)  Skilled Nursing Facility    This discharge recommendation:  Has been made in collaboration with the attending provider and/or case management    IF patient discharges home will need the following DME: TBD       SUBJECTIVE:   Patient stated hi when therapist entered room. OBJECTIVE DATA SUMMARY:   Cognitive/Behavioral Status:  Neurologic State: Lethargic    Functional Mobility and Transfers for ADLs:  Bed Mobility:  Rolling: Total assistance;Assist x2  Supine to Sit: Total assistance;Assist x2  Sit to Supine: Total assistance;Assist x2  Scooting: Total assistance;Assist x2    Balance:  Sitting: Impaired; With support  Sitting - Static: Poor (constant support)  Sitting - Dynamic: Poor (constant support)    ADL Intervention:  Grooming  Position Performed: Seated edge of bed  Washing Face: Maximum assistance    Toileting  Bowel Hygiene: Total assistance (dependent)    Pain:  Unable to verbalize, and did not grimace with movement this date    Activity Tolerance:   Poor  Please refer to the flowsheet for vital signs taken during this treatment. After treatment patient left in no apparent distress:   Supine in bed, Patient positioned in R sidelying for pressure relief and Caregiver / family present    COMMUNICATION/COLLABORATION:   The patients plan of care was discussed with: Physical therapy assistant, Registered nurse, and Certified nursing assistant/patient care technician. Cotreat with PT for increased safety for pt and clinician. KONG Yanes  Time Calculation: 38 mins    Problem: Self Care Deficits Care Plan (Adult)  Goal: *Acute Goals and Plan of Care (Insert Text)  Description: 1. Pt will be min A sup <> sit in prep for EOB ADLs  2. Pt will be mod I grooming sitting EOB  3. Pt will be min A sit <> stand/stand pivot transfer in prep for toileting LRAD  4. Pt will be min A toileting/toilet transfer/cloth mgmt LRAD  6.  Pt will be mod I following UE HEP in prep for self care tasks      Outcome: Progressing Towards Goal

## 2022-03-10 NOTE — PROGRESS NOTES
Infectious Disease Progress Note           Subjective:   Pt seen and examined at bedside.  Stable more alert, not following commands, afebrile   Objective:   Physical Exam:     Visit Vitals  BP (!) 129/47 (BP 1 Location: Left lower arm, BP Patient Position: At rest;Supine)   Pulse 81   Temp 99.1 °F (37.3 °C)   Resp 19   Ht 5' 7\" (1.702 m)   Wt 181 lb 3.5 oz (82.2 kg)   SpO2 96%   BMI 28.38 kg/m²    O2 Flow Rate (L/min): 1 l/min (placed on room air) O2 Device: None (Room air)    Temp (24hrs), Av.7 °F (37.6 °C), Min:99.1 °F (37.3 °C), Max:99.9 °F (37.7 °C)    03/10 0701 - 03/10 1900  In: 0   Out: 800 [Urine:800]   1901 - 03/10 07  In: 725   Out: 1250 [Urine:1250]    General: NAD, alert, non-verbal, not following commands   HEENT: GUMARO, very dry mouth, dry mucous membranes   Lungs: CTA b/l, decreased at the bases, no wheeze/rhonchi   Heart: S1S2+, RRR, no murmur  Abdo: Soft, NT, ND, +BS   :+ indwelling giraldo cath    Exts: Ischemic changes involving left great toe   Skin: No pressure ulcer    Data Review:       Recent Days:  Recent Labs     22  0740   WBC 11.8*   HGB 7.7*   HCT 24.5*   *     Recent Labs     22  0740   BUN 32*   CREA 1.43*     Lab Results   Component Value Date/Time    C-Reactive protein 8.61 (H) 2022 04:00 AM      Microbiology     Results     Procedure Component Value Units Date/Time    CULTURE, BLOOD [[id number]] Collected: 22 1016    Order Status: Completed Specimen: Blood Updated: 03/10/22 0933     Special Requests: --        No Special Requests  Left  Forearm       Culture result: No growth 5 days       CULTURE, WOUND McKenzie Ayo STAIN [[id number]]  (Susceptibility) Collected: 22 2015    Order Status: Completed Specimen: Wound Updated: 22 1000     Special Requests: No Special Requests        GRAM STAIN 2+ Gram Negative Rods         2+ Gram Positive Cocci        Culture result: Heavy Escherichia coli         Moderate  Mixed skin yasmine isolated       Susceptibility      Escherichia coli     GEMA     Amikacin ($) Susceptible     Ampicillin ($) Resistant     Ampicillin/sulbactam ($) Resistant     Cefazolin ($) Resistant     Cefepime ($$) Susceptible     Cefoxitin Susceptible     Ceftazidime ($) Susceptible     Ceftriaxone ($) Susceptible     Ciprofloxacin ($) Susceptible     Gentamicin ($) Resistant     Levofloxacin ($) Susceptible     Meropenem ($$) Susceptible     Piperacillin/Tazobac ($) Susceptible     Tobramycin ($) Intermediate     Trimeth/Sulfa Resistant                  Linear View                   CULTURE, BLOOD [[id number]] Collected: 02/25/22 1230    Order Status: Completed Specimen: Blood Updated: 03/03/22 0831     Special Requests: No Special Requests        Culture result: No growth 6 days                Diagnostics   CXR Results  (Last 48 hours)    None             Assessment/Plan     1. PAD w Ischemic left great toe/chronic ulcer. Stable dry gangrenous changes on exam       Abnormal ERLINDA, no plans for intervention, toe will auto amputate, discussed w Dr Rashmi Freitas       Will leave on empiric Zosyn, transition to oral antibiotics upon d/c       Routine labs in the morning     2. UTI complicated by hematuria, suspect underlying neurogenic bladder from recent CVA      E. coli isolated from urine Cx on 2/23, blood Cx from 2/25 staying negative       Received appropriate course of GNR coverage     3. Positive hep C serology, Qualitative RNA neg, false positive serology      4. Recently CVA, w right sided weakness, remains aphasic      5. Dry mouth, crusted saliva in mouth. Seen by speech therapist, appreciate input and recs       Continue artificial saliva, and routine mouth care      6.  Diarrhea, decreased BM frequency w addition of metamucil      Again a low suspicion for CDI, continue probiotics and prn imodium     Willie Pastor MD    3/10/2022

## 2022-03-10 NOTE — PROGRESS NOTES
Progress Note    Patient: Ivana Ganser MRN: [id number]  SSN: [id number]    YOB: 1945  Age: 78 y.o. Sex: female      Admit Date: 2/21/2022    LOS: 13 days     Subjective:     Lying in bed more alert. Bedbound. Daughter by the bedside    Objective:     Vitals:    03/09/22 1742 03/09/22 1941 03/10/22 0746 03/10/22 1552   BP:  (!) 137/51 (!) 129/47 (!) 146/52   Pulse:  75 81 74   Resp:  18 19 19   Temp: 99.8 °F (37.7 °C) 99.9 °F (37.7 °C) 99.1 °F (37.3 °C) (!) 101.9 °F (38.8 °C)   SpO2:  95% 96% 99%   Weight:       Height:            Intake and Output:  Current Shift: 03/10 0701 - 03/10 1900  In: 0   Out: 800 [Urine:800]  Last three shifts: 03/08 1901 - 03/10 0700  In: 725   Out: 1250 [Urine:1250]    Physical Exam:   General:  Alert, d age. Eyes:  Conjunctivae/corneas clear. PERRL, EOMs intact. Fundi benign   Ears:  Normal TMs and external ear canals both ears. Nose: Nares normal. Septum midline. Mucosa normal. No drainage or sinus tenderness. Mouth/Throat: Lips, mucosa, and tongue normal. Teeth and gums normal.   Neck: Supple, symmetrical, trachea midline, no adenopathy, thyroid: no enlargment/tenderness/nodules, no carotid bruit and no JVD. Back:   Symmetric, no curvature. ROM normal. No CVA tenderness. Lungs:   Clear to auscultation bilaterally. Heart:  Regular rate and rhythm, S1, S2 normal, no murmur, click, rub or gallop. Abdomen:   Soft, non-tender. Bowel sounds normal. No masses,  No organomegaly. Extremities:  Wound   Pulses: 2+ and symmetric all extremities. Skin:  Dry gangrene   Lymph nodes: Cervical, supraclavicular, and axillary nodes normal.   Neurologic: nd reflexes throughout. Lab/Data Review: All lab results for the last 24 hours reviewed.      Recent Results (from the past 24 hour(s))   PROTHROMBIN TIME + INR    Collection Time: 03/10/22  6:49 AM   Result Value Ref Range    Prothrombin time 17.0 (H) 11.9 - 14.6 sec    INR 1.4 (H) 0.9 - 1.1     HEPATIC FUNCTION PANEL    Collection Time: 03/10/22  6:49 AM   Result Value Ref Range    Protein, total 6.7 6.4 - 8.2 g/dL    Albumin 1.9 (L) 3.5 - 5.0 g/dL    Globulin 4.8 (H) 2.0 - 4.0 g/dL    A-G Ratio 0.4 (L) 1.1 - 2.2      Bilirubin, total 0.3 0.2 - 1.0 mg/dL    Bilirubin, direct 0.1 0.0 - 0.2 mg/dL    Alk. phosphatase 87 45 - 117 U/L    AST (SGOT) 23 15 - 37 U/L    ALT (SGPT) 72 12 - 78 U/L   GLUCOSE, POC    Collection Time: 03/10/22  7:50 AM   Result Value Ref Range    Glucose (POC) 370 (H) 65 - 117 mg/dL    Performed by Javi Amezquita, POC    Collection Time: 03/10/22 11:23 AM   Result Value Ref Range    Glucose (POC) 333 (H) 65 - 117 mg/dL    Performed by Javi Amezqutia, POC    Collection Time: 03/10/22  3:56 PM   Result Value Ref Range    Glucose (POC) 289 (H) 65 - 117 mg/dL    Performed by Essie Stovall          Assessment:     Active Problems:    CVA (cerebral vascular accident) ([address] (2/21/2022)      Elevated troponin (2/21/2022)    Kidney injury resolved  Acute CVA with right hemiparesis  Diabetes mellitus type 2 uncontrolled  PVD  UTI  Diarrhea is improved  Plan:      for discharge planning.   This was discussed with the daughter    Signed By: Chintan Mejia MD     March 10, 2022

## 2022-03-11 NOTE — PROGRESS NOTES
OCCUPATIONAL THERAPY TREATMENT  Patient: Fransisco Fowler (14 y.o. female)  Date: 3/11/2022  Diagnosis: CVA (cerebral vascular accident) ([facility] [address] [I63.9]  Elevated troponin [R77.8] <principal problem not specified>  Procedure(s) (LRB):  PERCUTANEOUS ENDOSCOPIC GASTROSTOMY TUBE INSERTION (N/A)  ESOPHAGOGASTRODUODENOSCOPY (EGD) (N/A) 9 Days Post-Op  Precautions:    Chart, occupational therapy assessment, plan of care, and goals were reviewed. ASSESSMENT  Patient continues with skilled OT services and is progressing towards goals. Upon TRIANA/PTA arrival, pt semi supine in bed with daughters in room. Pt noted to be very lethargic throughout session and requiring constant cueing for staying awake with eyes open. Bed mobility fully completed with total A x2 this date. Pt noted to be soiled once rolled, total A bowel hygiene completed while in supine. Hand hygiene completed with total A while at EOB. Pt noted with poor static and dynamic sitting balance. Pt returned to supine with total A x2 and left sidelying on R side with wedge and daughters at bedside. Will continue to follow pt throughout remainder of stay and progress towards OT goals. Recommending SNF at discharge when medically appropriate. Other factors to consider for discharge: home support, PLOF, severity of deficits         PLAN :  Patient continues to benefit from skilled intervention to address the above impairments. Continue treatment per established plan of care. to address goals. Recommendation for discharge: (in order for the patient to meet his/her long term goals)  Elie Patel    This discharge recommendation:  Has been made in collaboration with the attending provider and/or case management    IF patient discharges home will need the following DME: TBD       SUBJECTIVE:   Patient stated two when asked how many fingers are being held up.     OBJECTIVE DATA SUMMARY:   Cognitive/Behavioral Status:  Neurologic State: Confused; Lethargic  Orientation Level: Unable to verbalize    Functional Mobility and Transfers for ADLs:  Bed Mobility:  Rolling: Total assistance;Assist x2  Supine to Sit: Total assistance;Assist x2  Sit to Supine: Total assistance;Assist x2  Scooting: Total assistance;Assist x2    Balance:  Sitting: Impaired; With support  Sitting - Static: Poor (constant support)  Sitting - Dynamic: Poor (constant support)    ADL Intervention:  Grooming  Position Performed: Seated edge of bed  Washing Hands: Total assistance (dependent)    Toileting  Bowel Hygiene: Total assistance (dependent)    Pain:  Pt unable to verbalize, no grimacing noted    Activity Tolerance:   Poor  Please refer to the flowsheet for vital signs taken during this treatment. After treatment patient left in no apparent distress:   Supine in bed and Caregiver / family present    COMMUNICATION/COLLABORATION:   The patients plan of care was discussed with: Physical therapy assistant and Registered nurse. Cotreat with PT for increased safety for pt and clinician. KONG Yanes  Time Calculation: 25 mins    Problem: Self Care Deficits Care Plan (Adult)  Goal: *Acute Goals and Plan of Care (Insert Text)  Description: 1. Pt will be min A sup <> sit in prep for EOB ADLs  2. Pt will be mod I grooming sitting EOB  3. Pt will be min A sit <> stand/stand pivot transfer in prep for toileting LRAD  4. Pt will be min A toileting/toilet transfer/cloth mgmt LRAD  6.  Pt will be mod I following UE HEP in prep for self care tasks      Outcome: Not Progressing Towards Goal

## 2022-03-11 NOTE — PROGRESS NOTES
Progress Note    Patient: Idania Grimaldo MRN: [id number]  SSN: [id number]    YOB: 1945  Age: 78 y.o. Sex: female      Admit Date: 2/21/2022    LOS: 14 days     Subjective:     Patient has no more diarrhea according to the daughter. Patient is alert bedbound. Spiked a temperature yesterday     Objective:     Vitals:    03/10/22 1552 03/10/22 2007 03/11/22 0718 03/11/22 1504   BP: (!) 146/52 (!) 148/57 (!) 170/58 (!) 151/70   Pulse: 74 72 80 72   Resp: 19 18 18 18   Temp: (!) 101.9 °F (38.8 °C) 97.3 °F (36.3 °C) 97.3 °F (36.3 °C) 97.8 °F (36.6 °C)   SpO2: 99% 99% 97% 96%   Weight:       Height:            Intake and Output:  Current Shift: No intake/output data recorded. Last three shifts: 03/09 1901 - 03/11 0700  In: 200   Out: 1900 [Urine:1900]    Physical Exam:   General:  Alert,    Eyes:  Conjunctivae/corneas clear. PERRL, EOMs intact. Fundi benign   Ears:  Normal TMs and external ear canals both ears. Nose: Nares normal. Septum midline. Mucosa normal. No drainage or sinus tenderness. Mouth/Throat: Lips, mucosa, and tongue normal. Teeth and gums normal.   Neck: Supple, symmetrical, trachea midline, no adenopathy, thyroid: no enlargment/tenderness/nodules, no carotid bruit and no JVD. Back:   Symmetric, no curvature. ROM normal. No CVA tenderness. Lungs:   Clear to auscultation bilaterally. Heart:  Regular rate and rhythm, S1, S2 normal, no murmur, click, rub or gallop. Abdomen:   Soft, non-tender. Bowel sounds normal. No masses,  No organomegaly. Extremities: Extremities normal, atraumatic, no cyanosis or edema. Pulses: 2+ and symmetric all extremities. Skin: Skin color, texture, turgor normal. No rashes or lesions   Lymph nodes: weakness   Neurologic:        Lab/Data Review: All lab results for the last 24 hours reviewed.      Recent Results (from the past 24 hour(s))   GLUCOSE, POC    Collection Time: 03/10/22  8:53 PM   Result Value Ref Range    Glucose (POC) 257 (H) 65 - 117 mg/dL    Performed by Carlos Armenta + INR    Collection Time: 03/11/22  6:26 AM   Result Value Ref Range    Prothrombin time 17.5 (H) 11.9 - 14.6 sec    INR 1.5 (H) 0.9 - 1.1     HEPATIC FUNCTION PANEL    Collection Time: 03/11/22  6:26 AM   Result Value Ref Range    Protein, total 6.6 6.4 - 8.2 g/dL    Albumin 1.9 (L) 3.5 - 5.0 g/dL    Globulin 4.7 (H) 2.0 - 4.0 g/dL    A-G Ratio 0.4 (L) 1.1 - 2.2      Bilirubin, total 0.2 0.2 - 1.0 mg/dL    Bilirubin, direct <0.1 0.0 - 0.2 mg/dL    Alk.  phosphatase 79 45 - 117 U/L    AST (SGOT) 23 15 - 37 U/L    ALT (SGPT) 62 12 - 78 U/L   GLUCOSE, POC    Collection Time: 03/11/22  7:16 AM   Result Value Ref Range    Glucose (POC) 299 (H) 65 - 117 mg/dL    Performed by Joana Bennett    GLUCOSE, POC    Collection Time: 03/11/22 11:18 AM   Result Value Ref Range    Glucose (POC) 267 (H) 65 - 117 mg/dL    Performed by Joana Bennett          Assessment:     Active Problems:    CVA (cerebral vascular accident) ([address] (2/21/2022)      Elevated troponin (2/21/2022)    Non-Q wave MI  PAD  Times mellitus type II uncontrolled  Acute CVA with hemiparesis patient is bedbound and has some dry gangrene  UTI  Malnutrition with low albumin  Plan:     Discussed with the patient and the patient's daughter  Family said they are in touch with [facility] to get a physician there to accept patient for transfer by family  Request.  Adjustments to insulin for better coverage  Signed By: Edinson Mckay MD     March 11, 2022

## 2022-03-11 NOTE — PROGRESS NOTES
[facility] liaison Gloria, met w/Ppatient's family at bedside.   Informed she's not appropriate for [facility] d/t declining per [facility] MD.       Family desires to transfer patient to another hospital.  Attending aware of family working on transferring to another hospital.          Mahad Nix, MSW

## 2022-03-11 NOTE — PROGRESS NOTES
SPEECH LANGUAGE PATHOLOGY LANGUAGE TREATMENT  Patient: Ananth Pierson (50 y.o. female)  Date: 3/11/2022  Diagnosis: CVA (cerebral vascular accident) ([facility] [address] [I63.9]  Elevated troponin [R77.8] <principal problem not specified>  Procedure(s) (LRB):  PERCUTANEOUS ENDOSCOPIC GASTROSTOMY TUBE INSERTION (N/A)  ESOPHAGOGASTRODUODENOSCOPY (EGD) (N/A) 9 Days Post-Op  Precautions:      ASSESSMENT :  Patient is alert, oriented x2 and follows basic commands. Daughters at bedside report working on counting and social greetings. Oral hygiene improved. RN just provided oral care. Patient able to approximate name and social greetings. Improvement noted in spontaneous speech. Expressive language tasks:   Object labelin%, phrase completion: 85%, automatic speech: 30% (counting)   Receptive language: orientation yes/no, 1 step commands 100%. Patient w/ delayed responses however more vocal, she continues w/ moderate dysarthria and verbal apraxia. She benefits from increased time for tasks, redirection to tasks, and visual cues. Patient engaged and motivated however she does quickly fatigue. Family receptive of education. Homework provided. Rec print resources for family. PLAN :  Recommendations and Planned Interventions:  Rec cont ST for speech language deficits. Frequency/Duration: Patient will be followed by speech-language pathology 5 times a week to address goals. Discharge Recommendations: Skilled Nursing Facility     SUBJECTIVE:   Patient states \"thank you\" at completion of session .     OBJECTIVE:     Past Medical History:   Diagnosis Date    Diabetes mellitus ([facility] [address]     HTN (hypertension)     Hyperlipidemia     Neuropathy     PAD (peripheral artery disease) (HCC)     PCI    Sciatica        CXR Results  (Last 48 hours)      None            Current Diet:  DIET NPO  DIET ADULT TUBE FEEDING     Cognitive and Communication Status:  Neurologic State: Alert  Orientation Level: Oriented to person,Oriented to place  Cognition: Unable to assess (comment)  Perception: Appears intact  Perseveration: No perseveration noted  Safety/Judgement: Decreased insight into deficits         After treatment:   Patient left in no apparent distress in bed, Call bell within reach, Nursing notified, Caregiver / family present, and Bed / chair alarm activated    COMMUNICATION/EDUCATION:   Family receptive of education on ST POC, communication strategies and homework.    Thank you for this referral.  Essie Sandy M.S., M.Ed., CCC-SLP  Time Calculation: 17 mins

## 2022-03-11 NOTE — PROGRESS NOTES
Patient bladder scan was 247ml at 2000. Current scan shows >339. Per Urology NP, Matilda Marques, order,,\" If the patient does not void,  please bladder scan. Call provider if bladder scan 300 cc or more. Report gross hematuria. \" Jose Juan Painting MD paged, Dr. Sondra García. Per MD, I/o cath patient every shift. Order was verified and read back for clarification at that time.

## 2022-03-11 NOTE — PROGRESS NOTES
PHYSICAL THERAPY TREATMENT  Patient: Darci Allan (35 y.o. female)  Date: 3/11/2022  Diagnosis: CVA (cerebral vascular accident) ([facility] [address] [I63.9]  Elevated troponin [R77.8] <principal problem not specified>  Procedure(s) (LRB):  PERCUTANEOUS ENDOSCOPIC GASTROSTOMY TUBE INSERTION (N/A)  ESOPHAGOGASTRODUODENOSCOPY (EGD) (N/A) 9 Days Post-Op  Precautions:    Chart, physical therapy assessment, plan of care and goals were reviewed. ASSESSMENT  Patient continues with skilled PT services and is not progressing towards goals. Continues to require TA x 2 for all mobility. Pt requires constant vc to keep eyes open during session, pt very lethargic. Pt sat EOB with TA for sitting balance ~5 minutes. Returned to semi supine position with TA x 2. Pt noted to be soiled once rolled, total A bowel hygiene completed while in supine. Pt left in R side lying with wedge. Family present in room throughout session. Rec d/c to SNF once medically appropriate. Current Level of Function Impacting Discharge (mobility/balance): TA x 2 for all mobility     Other factors to consider for discharge: PLOF, time since onset, severity of deficits          PLAN :  Patient continues to benefit from skilled intervention to address the above impairments. Continue treatment per established plan of care. to address goals. Recommendation for discharge: (in order for the patient to meet his/her long term goals)  Elie Patel    This discharge recommendation:  Has been made in collaboration with the attending provider and/or case management    IF patient discharges home will need the following DME: to be determined (TBD)       SUBJECTIVE:   Patient able to verbalize what sounded like the word \"two\" to her daughter.     OBJECTIVE DATA SUMMARY:   Critical Behavior:  Neurologic State: Confused,Lethargic  Orientation Level: Unable to verbalize  Cognition: Unable to assess (comment)  Safety/Judgement: Decreased insight into deficits  Functional Mobility Training:  Bed Mobility:  Rolling: Total assistance;Assist x2  Supine to Sit: Total assistance;Assist x2  Sit to Supine: Total assistance;Assist x2  Scooting: Total assistance;Assist x2    Balance:  Sitting: Impaired; With support  Sitting - Static: Poor (constant support)  Sitting - Dynamic: Poor (constant support)    Pain Rating:  Pt does not verbalize, no grimace noted. Activity Tolerance:   Poor  Please refer to the flowsheet for vital signs taken during this treatment. After treatment patient left in no apparent distress:   Supine in bed, Call bell within reach, Bed / chair alarm activated, Caregiver / family present, and Side rails x 3    COMMUNICATION/COLLABORATION:   The patients plan of care was discussed with: Occupational therapy assistant and Registered nurse. OT/PT sessions occurred together for increased patient and clinician safety    Problem: Mobility Impaired (Adult and Pediatric)  Goal: *Acute Goals and Plan of Care (Insert Text)  Description: Revised 3/4/22  Pt will be I with LE HEP in 7 days. Pt will perform bed mobility with min Ax1 in 7 days. Pt will perform transfers with min Ax1 in 7 days. Pt will amb 5-10 feet with LRAD safely with min Ax1 in 7 days. Pt will demonstrate improvement in dynamic seated and standing balance with min A in 7 days.      Outcome: Progressing Towards Goal       Michael Rendon PTA   Time Calculation: 25 mins

## 2022-03-12 NOTE — PROGRESS NOTES
Infectious Disease Progress Note           Subjective:   Doing great stable, alert and attempting to communicate, daughter at bedside. Denies new complaints.  No acute events since last seen   Objective:   Physical Exam:     Visit Vitals  BP (!) 159/68 (BP 1 Location: Left upper arm, BP Patient Position: At rest)   Pulse 85   Temp 98.1 °F (36.7 °C)   Resp 18   Ht 5' 7\" (1.702 m)   Wt 181 lb 3.5 oz (82.2 kg)   SpO2 95%   BMI 28.38 kg/m²    O2 Flow Rate (L/min): 1 l/min (placed on room air) O2 Device: None (Room air)    Temp (24hrs), Av °F (37.2 °C), Min:98.1 °F (36.7 °C), Max:99.7 °F (37.6 °C)    701 - 1900  In: 100   Out: 1301 [Urine:1300]   03/10 1901 -  0700  In: 200   Out: 1675 [Urine:1675]    General: NAD, alert, non-verbal, not following commands   HEENT: GUMARO, very dry mouth, dry mucous membranes   Lungs: CTA b/l, decreased at the bases, no wheeze/rhonchi   Heart: S1S2+, RRR, no murmur  Abdo: Soft, NT, ND, +BS   :+ indwelling giraldo cath    Exts: Ischemic changes involving left great toe   Skin: No pressure ulcer    Data Review:       Recent Days:  Recent Labs     22  0633   WBC 8.5   HGB 7.6*   HCT 25.4*   *     Recent Labs     22  0633   BUN 37*   CREA 1.37*     Lab Results   Component Value Date/Time    C-Reactive protein 8.61 (H) 2022 04:00 AM      Microbiology     Results     Procedure Component Value Units Date/Time    CULTURE, BLOOD [[id number]] Collected: 22 1016    Order Status: Completed Specimen: Blood Updated: 22 1152     Special Requests: --        No Special Requests  Left  Forearm       Culture result: No growth 6 days       CULTURE, WOUND Quenten [place] STAIN [[id number]]  (Susceptibility) Collected: 22 2015    Order Status: Completed Specimen: Wound Updated: 22 1000     Special Requests: No Special Requests        GRAM STAIN 2+ Gram Negative Rods         2+ Gram Positive Cocci        Culture result: Heavy Escherichia coli         Moderate  Mixed skin yasmine isolated       Susceptibility      Escherichia coli     GEMA     Amikacin ($) Susceptible     Ampicillin ($) Resistant     Ampicillin/sulbactam ($) Resistant     Cefazolin ($) Resistant     Cefepime ($$) Susceptible     Cefoxitin Susceptible     Ceftazidime ($) Susceptible     Ceftriaxone ($) Susceptible     Ciprofloxacin ($) Susceptible     Gentamicin ($) Resistant     Levofloxacin ($) Susceptible     Meropenem ($$) Susceptible     Piperacillin/Tazobac ($) Susceptible     Tobramycin ($) Intermediate     Trimeth/Sulfa Resistant                  Linear View                            Diagnostics   CXR Results  (Last 48 hours)    None             Assessment/Plan     1. PAD w Ischemic left great toe/chronic ulcer. Stable dry gangrenous changes on exam       Abnormal ERLINDA, no plans for intervention, toe will auto amputate, discussed w Dr Fransisco Banks     . Pt unlikely to benefit from long term IV antibiotics       Afebrile a normal WBC on todays labs       Zosyn discontinued, will monitor for superinfection      Routine labs in the morning     2. UTI complicated by hematuria, suspect underlying neurogenic bladder from recent CVA      E. coli isolated from urine Cx on 2/23, blood Cx from 2/25 staying negative       S/p 2 wks of GNR coverage     3. Positive hep C serology, Qualitative RNA neg, false positive serology      4. Recently CVA, w right sided weakness, remains aphasic      5. Dry mouth, month breather, continue daily mouth care, discussed w RN      6.  Diarrhea, from tube feeds, continue metamucil and probiotics    Fmly want transfer to [facility] for a second opinion     Marylen Leventhal, MD    3/12/2022

## 2022-03-12 NOTE — PROGRESS NOTES
General Daily Progress Note          Patient Name:   Chelsi Mark       YOB: 1945       Age:  78 y.o. Admit Date: 2/21/2022      Subjective:         Patient resting in bed not in distress getting PEG tube for        Objective:     Visit Vitals  BP (!) 158/65 (BP 1 Location: Left upper arm, BP Patient Position: At rest)   Pulse 70   Temp 99.2 °F (37.3 °C)   Resp 16   Ht 5' 7\" (1.702 m)   Wt 82.2 kg (181 lb 3.5 oz)   SpO2 100%   BMI 28.38 kg/m²        Recent Results (from the past 24 hour(s))   GLUCOSE, POC    Collection Time: 03/11/22  6:36 PM   Result Value Ref Range    Glucose (POC) 232 (H) 65 - 117 mg/dL    Performed by Fred JUNE    GLUCOSE, POC    Collection Time: 03/11/22  8:43 PM   Result Value Ref Range    Glucose (POC) 243 (H) 65 - 117 mg/dL    Performed by Humberto Buckley    PROTHROMBIN TIME + INR    Collection Time: 03/12/22  6:33 AM   Result Value Ref Range    Prothrombin time 16.6 (H) 11.9 - 14.6 sec    INR 1.4 (H) 0.9 - 1.1     HEPATIC FUNCTION PANEL    Collection Time: 03/12/22  6:33 AM   Result Value Ref Range    Protein, total 6.2 (L) 6.4 - 8.2 g/dL    Albumin 1.8 (L) 3.5 - 5.0 g/dL    Globulin 4.4 (H) 2.0 - 4.0 g/dL    A-G Ratio 0.4 (L) 1.1 - 2.2      Bilirubin, total 0.2 0.2 - 1.0 mg/dL    Bilirubin, direct <0.1 0.0 - 0.2 mg/dL    Alk.  phosphatase 72 45 - 117 U/L    AST (SGOT) 21 15 - 37 U/L    ALT (SGPT) 50 12 - 78 U/L   METABOLIC PANEL, BASIC    Collection Time: 03/12/22  6:33 AM   Result Value Ref Range    Sodium 149 (H) 136 - 145 mmol/L    Potassium 4.5 3.5 - 5.1 mmol/L    Chloride 117 (H) 97 - 108 mmol/L    CO2 26 21 - 32 mmol/L    Anion gap 6 5 - 15 mmol/L    Glucose 262 (H) 65 - 100 mg/dL    BUN 37 (H) 6 - 20 mg/dL    Creatinine 1.37 (H) 0.55 - 1.02 mg/dL    BUN/Creatinine ratio 27 (H) 12 - 20      GFR est AA 46 (L) >60 ml/min/1.73m2    GFR est non-AA 38 (L) >60 ml/min/1.73m2    Calcium 8.4 (L) 8.5 - 10.1 mg/dL   CBC WITH AUTOMATED DIFF    Collection Time: 03/12/22  6:33 AM   Result Value Ref Range    WBC 8.5 3.6 - 11.0 K/uL    RBC 2.72 (L) 3.80 - 5.20 M/uL    HGB 7.6 (L) 11.5 - 16.0 g/dL    HCT 25.4 (L) 35.0 - 47.0 %    MCV 93.4 80.0 - 99.0 FL    MCH 27.9 26.0 - 34.0 PG    MCHC 29.9 (L) 30.0 - 36.5 g/dL    RDW 16.0 (H) 11.5 - 14.5 %    PLATELET 757 (H) 509 - 400 K/uL    MPV 10.9 8.9 - 12.9 FL    NRBC 0.0 0.0  WBC    ABSOLUTE NRBC 0.00 0.00 - 0.01 K/uL    NEUTROPHILS 65 32 - 75 %    LYMPHOCYTES 25 12 - 49 %    MONOCYTES 7 5 - 13 %    EOSINOPHILS 2 0 - 7 %    BASOPHILS 1 0 - 1 %    IMMATURE GRANULOCYTES 0 0 - 0.5 %    ABS. NEUTROPHILS 5.5 1.8 - 8.0 K/UL    ABS. LYMPHOCYTES 2.1 0.8 - 3.5 K/UL    ABS. MONOCYTES 0.6 0.0 - 1.0 K/UL    ABS. EOSINOPHILS 0.2 0.0 - 0.4 K/UL    ABS. BASOPHILS 0.1 0.0 - 0.1 K/UL    ABS. IMM. GRANS. 0.0 0.00 - 0.04 K/UL    DF AUTOMATED     GLUCOSE, POC    Collection Time: 03/12/22  7:24 AM   Result Value Ref Range    Glucose (POC) 279 (H) 65 - 117 mg/dL    Performed by [employer]    GLUCOSE, POC    Collection Time: 03/12/22 11:07 AM   Result Value Ref Range    Glucose (POC) 275 (H) 65 - 117 mg/dL    Performed by Sina Casey      [unfilled]      Review of Systems    Constitutional: Negative for chills and fever. HENT: Negative. Eyes: Negative. Respiratory: Negative. Cardiovascular: Negative. Gastrointestinal: Negative for abdominal pain and nausea. Skin: Negative. Neurological: Negative. Physical Exam:      Constitutional: pt is oriented to person, place, and time. HENT:   Head: Normocephalic and atraumatic. Eyes: Pupils are equal, round, and reactive to light. EOM are normal.   Cardiovascular: Normal rate, regular rhythm and normal heart sounds. Pulmonary/Chest: Breath sounds normal. No wheezes. No rales. Exhibits no tenderness. Abdominal: Soft. Bowel sounds are normal. There is no abdominal tenderness. There is no rebound and no guarding. Musculoskeletal: Normal range of motion. Neurological: pt is alert and oriented to person, place, and time. DUPLEX LOWER EXT ARTERY BILAT   Final Result      ANKLE BRACHIAL INDEX   Final Result      CT HEAD WO CONT   Final Result   1. Evolving left FREDO territory ischemic infarct. No acute hemorrhage. 2.  Paranasal sinus disease. XR FOOT LT AP/LAT   Final Result      DUPLEX UPPER EXT VENOUS RIGHT   Final Result      US ABD COMP   Final Result   Gallbladder sludge without other acute abnormality. XR CHEST PORT   Final Result   The cardiomediastinal silhouette is appropriate for age, technique,   and lung expansion. Pulmonary vasculature is not congested. The lungs are   essentially clear. No effusion or pneumothorax is seen. CT HEAD WO CONT   Final Result   Negative for acute intracranial process. CT ABD PELV WO CONT   Final Result   Hemorrhage within the urinary bladder. Mild left   hydroureteronephrosis. XR SWALLOW FUNC VIDEO   Final Result   Penetration with thin consistency. No aspiration. CTA HEAD NECK W WO CONT   Final Result   Negative head and neck CTA. Please note that all carotid bifurcation stenoses are measured as per NASCET   criteria. CT CODE NEURO HEAD WO CONTRAST   Final Result   1. No acute large vessel intracranial ischemia, hemorrhage. Called report to   patient's nurse Moriah Centeno at 7:12 PM 2/22/2022.   2. Right basal ganglia lacunar infarct. 3. Periventricular microangiopathy. 4. Pansinusitis. See above. MRI BRAIN WO CONT   Final Result   Acute infarction left paracentral lobule and region of the white   matter of the left motor cortex. CT CODE NEURO HEAD WO CONTRAST   Final Result      1. No acute intracranial hemorrhage. 2. Left basal ganglia lacunar infarct, which is age indeterminate in the absence   of any prior outside studies, but favored to be chronic. 3. Moderate to severe paranasal sinus disease.       Results called to Dr. Ave Blackburn at 9:39 PM on 2/21/2022 Recent Results (from the past 24 hour(s))   GLUCOSE, POC    Collection Time: 03/11/22  6:36 PM   Result Value Ref Range    Glucose (POC) 232 (H) 65 - 117 mg/dL    Performed by Catalina JUNE    GLUCOSE, POC    Collection Time: 03/11/22  8:43 PM   Result Value Ref Range    Glucose (POC) 243 (H) 65 - 117 mg/dL    Performed by Chemo Maza + INR    Collection Time: 03/12/22  6:33 AM   Result Value Ref Range    Prothrombin time 16.6 (H) 11.9 - 14.6 sec    INR 1.4 (H) 0.9 - 1.1     HEPATIC FUNCTION PANEL    Collection Time: 03/12/22  6:33 AM   Result Value Ref Range    Protein, total 6.2 (L) 6.4 - 8.2 g/dL    Albumin 1.8 (L) 3.5 - 5.0 g/dL    Globulin 4.4 (H) 2.0 - 4.0 g/dL    A-G Ratio 0.4 (L) 1.1 - 2.2      Bilirubin, total 0.2 0.2 - 1.0 mg/dL    Bilirubin, direct <0.1 0.0 - 0.2 mg/dL    Alk.  phosphatase 72 45 - 117 U/L    AST (SGOT) 21 15 - 37 U/L    ALT (SGPT) 50 12 - 78 U/L   METABOLIC PANEL, BASIC    Collection Time: 03/12/22  6:33 AM   Result Value Ref Range    Sodium 149 (H) 136 - 145 mmol/L    Potassium 4.5 3.5 - 5.1 mmol/L    Chloride 117 (H) 97 - 108 mmol/L    CO2 26 21 - 32 mmol/L    Anion gap 6 5 - 15 mmol/L    Glucose 262 (H) 65 - 100 mg/dL    BUN 37 (H) 6 - 20 mg/dL    Creatinine 1.37 (H) 0.55 - 1.02 mg/dL    BUN/Creatinine ratio 27 (H) 12 - 20      GFR est AA 46 (L) >60 ml/min/1.73m2    GFR est non-AA 38 (L) >60 ml/min/1.73m2    Calcium 8.4 (L) 8.5 - 10.1 mg/dL   CBC WITH AUTOMATED DIFF    Collection Time: 03/12/22  6:33 AM   Result Value Ref Range    WBC 8.5 3.6 - 11.0 K/uL    RBC 2.72 (L) 3.80 - 5.20 M/uL    HGB 7.6 (L) 11.5 - 16.0 g/dL    HCT 25.4 (L) 35.0 - 47.0 %    MCV 93.4 80.0 - 99.0 FL    MCH 27.9 26.0 - 34.0 PG    MCHC 29.9 (L) 30.0 - 36.5 g/dL    RDW 16.0 (H) 11.5 - 14.5 %    PLATELET 343 (H) 218 - 400 K/uL    MPV 10.9 8.9 - 12.9 FL    NRBC 0.0 0.0  WBC    ABSOLUTE NRBC 0.00 0.00 - 0.01 K/uL    NEUTROPHILS 65 32 - 75 %    LYMPHOCYTES 25 12 - 49 % MONOCYTES 7 5 - 13 %    EOSINOPHILS 2 0 - 7 %    BASOPHILS 1 0 - 1 %    IMMATURE GRANULOCYTES 0 0 - 0.5 %    ABS. NEUTROPHILS 5.5 1.8 - 8.0 K/UL    ABS. LYMPHOCYTES 2.1 0.8 - 3.5 K/UL    ABS. MONOCYTES 0.6 0.0 - 1.0 K/UL    ABS. EOSINOPHILS 0.2 0.0 - 0.4 K/UL    ABS. BASOPHILS 0.1 0.0 - 0.1 K/UL    ABS. IMM.  GRANS. 0.0 0.00 - 0.04 K/UL    DF AUTOMATED     GLUCOSE, POC    Collection Time: 03/12/22  7:24 AM   Result Value Ref Range    Glucose (POC) 279 (H) 65 - 117 mg/dL    Performed by [employer]    GLUCOSE, POC    Collection Time: 03/12/22 11:07 AM   Result Value Ref Range    Glucose (POC) 275 (H) 65 - 117 mg/dL    Performed by [employer]        Results     Procedure Component Value Units Date/Time    CULTURE, BLOOD [[id number]] Collected: 03/05/22 1016    Order Status: Completed Specimen: Blood Updated: 03/11/22 1152     Special Requests: --        No Special Requests  Left  Forearm       Culture result: No growth 6 days       CULTURE, WOUND Laurian Avina STAIN [[id number]]  (Susceptibility) Collected: 02/28/22 2015    Order Status: Completed Specimen: Wound Updated: 03/04/22 1000     Special Requests: No Special Requests        GRAM STAIN 2+ Gram Negative Rods         2+ Gram Positive Cocci        Culture result: Heavy Escherichia coli         Moderate  Mixed skin yasmine isolated       Susceptibility      Escherichia coli     GEMA     Amikacin ($) Susceptible     Ampicillin ($) Resistant     Ampicillin/sulbactam ($) Resistant     Cefazolin ($) Resistant     Cefepime ($$) Susceptible     Cefoxitin Susceptible     Ceftazidime ($) Susceptible     Ceftriaxone ($) Susceptible     Ciprofloxacin ($) Susceptible     Gentamicin ($) Resistant     Levofloxacin ($) Susceptible     Meropenem ($$) Susceptible     Piperacillin/Tazobac ($) Susceptible     Tobramycin ($) Intermediate     Trimeth/Sulfa Resistant                  Linear View                          Labs:     Recent Labs     03/12/22  0633   WBC 8.5   HGB 7.6*   HCT 25.4*   *     Recent Labs     03/12/22  0633   *   K 4.5   *   CO2 26   BUN 37*   CREA 1.37*   *   CA 8.4*     Recent Labs     03/12/22 0633 03/11/22 0626 03/10/22  0649   ALT 50 62 72   AP 72 79 87   TBILI 0.2 0.2 0.3   TP 6.2* 6.6 6.7   ALB 1.8* 1.9* 1.9*   GLOB 4.4* 4.7* 4.8*     Recent Labs     03/12/22  0633 03/11/22  0626 03/10/22  0649   INR 1.4* 1.5* 1.4*   PTP 16.6* 17.5* 17.0*      No results for input(s): FE, TIBC, PSAT, FERR in the last 72 hours. No results found for: FOL, RBCF   No results for input(s): PH, PCO2, PO2 in the last 72 hours. No results for input(s): CPK, CKNDX, TROIQ in the last 72 hours.     No lab exists for component: CPKMB  Lab Results   Component Value Date/Time    Cholesterol, total 124 03/08/2022 07:40 AM    HDL Cholesterol 30 03/08/2022 07:40 AM    LDL,Direct 79 06/28/2021 12:00 AM    LDL, calculated 44.8 03/08/2022 07:40 AM    Triglyceride 246 (H) 03/08/2022 07:40 AM    CHOL/HDL Ratio 4.1 03/08/2022 07:40 AM     Lab Results   Component Value Date/Time    Glucose (POC) 275 (H) 03/12/2022 11:07 AM    Glucose (POC) 279 (H) 03/12/2022 07:24 AM    Glucose (POC) 243 (H) 03/11/2022 08:43 PM    Glucose (POC) 232 (H) 03/11/2022 06:36 PM    Glucose (POC) 267 (H) 03/11/2022 11:18 AM     No results found for: COLOR, APPRN, SPGRU, REFSG, ARIEL, PROTU, GLUCU, KETU, BILU, UROU, VICTORIANO, LEUKU, GLUKE, EPSU, BACTU, WBCU, RBCU, CASTS, UCRY      Assessment:     Non-STEMI  PVD  Acute CVA  UTI  Anemia  RICARDO  Type 2 diabetes      Plan:     Continue IV Zosyn  Amlodipine 5 mg daily aspirin 81 mg daily ferrous sulfate 3-500 twice a day Neurontin 300 mg twice a day Lantus 50 units subcu at bedtime metoprolol 50 mg twice a day        Current Facility-Administered Medications:     insulin glargine (LANTUS) injection 50 Units, 50 Units, SubCUTAneous, QHS, Orestes Norman MD, 50 Units at 03/11/22 2200    loperamide (IMODIUM) capsule 2 mg, 2 mg, Oral, Q4H PRN, MD Raiza De Jesus insulin lispro (HUMALOG) injection, , SubCUTAneous, AC&HS, Brenda Mora MD, 9 Units at 03/12/22 0849    glucose chewable tablet 16 g, 4 Tablet, Oral, PRN, Vance JACKSON MD    glucagon (GLUCAGEN) injection 1 mg, 1 mg, IntraMUSCular, PRN, Brenda Mora MD    artificial saliva (MOUTH KOTE) 1 Spray, 1 Spray, Oral, TID, Fred Barnes MD, 1 [place] at 03/12/22 1033    psyllium husk-aspartame (METAMUCIL FIBER) packet 1 Packet, 1 Packet, Per G Tube, BID, Waylon Byrd MD, 1 Packet at 03/12/22 1034    acidophilus-pectin, citrus tablet 2 Tablet, 2 Tablet, Oral, DAILY, Fred Barnes MD, 2 Tablet at 03/12/22 1024    piperacillin-tazobactam (ZOSYN) 3.375 g in 0.9% sodium chloride (MBP/ADV) 100 mL MBP, 3.375 g, IntraVENous, Q8H, Fred Barnes MD, Last Rate: 25 mL/hr at 03/12/22 1023, 3.375 g at 03/12/22 1023    aspirin chewable tablet 81 mg, 81 mg, Oral, DAILY, Caity Brito MD, 81 mg at 03/12/22 1024    amLODIPine (NORVASC) tablet 5 mg, 5 mg, Oral, BID, Katerin Marquez MD, 5 mg at 03/12/22 1024    metoprolol succinate (TOPROL-XL) XL tablet 50 mg, 50 mg, Oral, BID, Katerin Marquez MD, 50 mg at 03/12/22 1024    acetaminophen (TYLENOL) solution 650 mg, 650 mg, Per G Tube, Q4H PRN, Vance JACKSON MD, 650 mg at 03/10/22 1553    gabapentin (NEURONTIN) capsule 300 mg, 300 mg, Oral, BID, Kalpesh Lara MD, 300 mg at 03/12/22 1024    0.45% sodium chloride infusion, 25 mL/hr, IntraVENous, CONTINUOUS, Kalpesh Lara MD, Last Rate: 25 mL/hr at 03/09/22 1909, 25 mL/hr at 03/09/22 1909    0.9% sodium chloride infusion 250 mL, 250 mL, IntraVENous, PRN, Israel Soto MD    latanoprost (XALATAN) 0.005 % ophthalmic solution 1 Drop, 1 Drop, Right Eye, QPM, Israel Soto MD, 1 Drop at 03/11/22 1848    0.9% sodium chloride infusion 250 mL, 250 mL, IntraVENous, PRN, Bebeto Louise MD    zinc oxide-white petrolatum 20-75 % topical paste, , Topical, BID, Agada, Ching Suraez MD, Given at 03/12/22 0900    lidocaine (XYLOCAINE) 4 % (40 mg/mL) topical solution, , Topical, PRN, Abe JACKSON MD, Given at 02/26/22 2207    [Held by provider] atorvastatin (LIPITOR) tablet 80 mg, 80 mg, Oral, DAILY, Loc Diaz MD, 80 mg at 02/26/22 0900    labetaloL (NORMODYNE;TRANDATE) 20 mg/4 mL (5 mg/mL) injection 10 mg, 10 mg, IntraVENous, Q6H PRN, Loc Diza MD, 10 mg at 03/02/22 1224    sodium chloride (NS) flush 5-40 mL, 5-40 mL, IntraVENous, PRN, Orestes Norman MD    brimonidine (ALPHAGAN) 0.2 % ophthalmic solution 1 Drop, 1 Drop, Both Eyes, TID, Orestes Norman MD, 1 Drop at 03/12/22 1034    ferrous sulfate tablet 325 mg, 325 mg, Oral, BID, Orestes Norman MD, 325 mg at 03/12/22 1024    dextrose 10% infusion 125-250 mL, 125-250 mL, IntraVENous, PRN, Guerline Correia MD

## 2022-03-13 NOTE — PROGRESS NOTES
Patient antibiotic (zosyn) was discontinued on 3/12/22. Nurse called attending physician about elevated sodium and chloride levels. Nurse received telephone/readback orders from physician to discontinue 0.45% NS @ 25. Patient IV access is saline locked.

## 2022-03-13 NOTE — PROGRESS NOTES
General Daily Progress Note          Patient Name:   Mya Merrill       YOB: 1945       Age:  78 y.o. Admit Date: 2/21/2022      Subjective:         Resting the bed awake not in distress        Objective:     Visit Vitals  BP (!) 164/75 (BP 1 Location: Left lower arm, BP Patient Position: At rest)   Pulse 72   Temp 98.2 °F (36.8 °C)   Resp 18   Ht 5' 7\" (1.702 m)   Wt 82.2 kg (181 lb 3.5 oz)   SpO2 100%   BMI 28.38 kg/m²        Recent Results (from the past 24 hour(s))   GLUCOSE, POC    Collection Time: 03/12/22  3:40 PM   Result Value Ref Range    Glucose (POC) 253 (H) 65 - 117 mg/dL    Performed by Tanvi Michaels    GLUCOSE, POC    Collection Time: 03/12/22  9:23 PM   Result Value Ref Range    Glucose (POC) 225 (H) 65 - 117 mg/dL    Performed by Gold Brito    GLUCOSE, POC    Collection Time: 03/13/22  5:59 AM   Result Value Ref Range    Glucose (POC) 266 (H) 65 - 117 mg/dL    Performed by Gold Brito    PROTHROMBIN TIME + INR    Collection Time: 03/13/22  7:22 AM   Result Value Ref Range    Prothrombin time 16.1 (H) 11.9 - 14.6 sec    INR 1.3 (H) 0.9 - 1.1     HEPATIC FUNCTION PANEL    Collection Time: 03/13/22  7:22 AM   Result Value Ref Range    Protein, total 6.5 6.4 - 8.2 g/dL    Albumin 1.9 (L) 3.5 - 5.0 g/dL    Globulin 4.6 (H) 2.0 - 4.0 g/dL    A-G Ratio 0.4 (L) 1.1 - 2.2      Bilirubin, total 0.2 0.2 - 1.0 mg/dL    Bilirubin, direct <0.1 0.0 - 0.2 mg/dL    Alk.  phosphatase 75 45 - 117 U/L    AST (SGOT) 18 15 - 37 U/L    ALT (SGPT) 44 12 - 78 U/L   METABOLIC PANEL, BASIC    Collection Time: 03/13/22  7:22 AM   Result Value Ref Range    Sodium 148 (H) 136 - 145 mmol/L    Potassium 4.5 3.5 - 5.1 mmol/L    Chloride 116 (H) 97 - 108 mmol/L    CO2 28 21 - 32 mmol/L    Anion gap 4 (L) 5 - 15 mmol/L    Glucose 262 (H) 65 - 100 mg/dL    BUN 33 (H) 6 - 20 mg/dL    Creatinine 1.25 (H) 0.55 - 1.02 mg/dL    BUN/Creatinine ratio 26 (H) 12 - 20      GFR est AA 51 (L) >60 ml/min/1.73m2 GFR est non-AA 42 (L) >60 ml/min/1.73m2    Calcium 8.4 (L) 8.5 - 10.1 mg/dL   CBC WITH AUTOMATED DIFF    Collection Time: 03/13/22  7:22 AM   Result Value Ref Range    WBC 8.9 3.6 - 11.0 K/uL    RBC 2.64 (L) 3.80 - 5.20 M/uL    HGB 7.5 (L) 11.5 - 16.0 g/dL    HCT 24.3 (L) 35.0 - 47.0 %    MCV 92.0 80.0 - 99.0 FL    MCH 28.4 26.0 - 34.0 PG    MCHC 30.9 30.0 - 36.5 g/dL    RDW 15.9 (H) 11.5 - 14.5 %    PLATELET 479 005 - 002 K/uL    MPV 10.8 8.9 - 12.9 FL    NRBC 0.3 (H) 0.0  WBC    ABSOLUTE NRBC 0.03 (H) 0.00 - 0.01 K/uL    NEUTROPHILS 66 32 - 75 %    LYMPHOCYTES 25 12 - 49 %    MONOCYTES 5 5 - 13 %    EOSINOPHILS 2 0 - 7 %    BASOPHILS 1 0 - 1 %    IMMATURE GRANULOCYTES 1 (H) 0 - 0.5 %    ABS. NEUTROPHILS 5.9 1.8 - 8.0 K/UL    ABS. LYMPHOCYTES 2.2 0.8 - 3.5 K/UL    ABS. MONOCYTES 0.5 0.0 - 1.0 K/UL    ABS. EOSINOPHILS 0.2 0.0 - 0.4 K/UL    ABS. BASOPHILS 0.1 0.0 - 0.1 K/UL    ABS. IMM. GRANS. 0.1 (H) 0.00 - 0.04 K/UL    DF AUTOMATED     GLUCOSE, POC    Collection Time: 03/13/22  7:49 AM   Result Value Ref Range    Glucose (POC) 272 (H) 65 - 117 mg/dL    Performed by Javieron Warrenfoot    GLUCOSE, POC    Collection Time: 03/13/22 10:59 AM   Result Value Ref Range    Glucose (POC) 162 (H) 65 - 117 mg/dL    Performed by Patrick Ahmadi      [unfilled]      Review of Systems    Constitutional: Negative for chills and fever. HENT: Negative. Eyes: Negative. Respiratory: Negative. Cardiovascular: Negative. Gastrointestinal: Negative for abdominal pain and nausea. Skin: Negative. Neurological: Negative. Physical Exam:      Constitutional: pt is oriented to person, place, and time. HENT:   Head: Normocephalic and atraumatic. Eyes: Pupils are equal, round, and reactive to light. EOM are normal.   Cardiovascular: Normal rate, regular rhythm and normal heart sounds. Pulmonary/Chest: Breath sounds normal. No wheezes. No rales. Exhibits no tenderness. Abdominal: Soft.  Bowel sounds are normal. There is no abdominal tenderness. There is no rebound and no guarding. Musculoskeletal: Normal range of motion. Neurological: pt is alert and oriented to person, place, and time. DUPLEX LOWER EXT ARTERY BILAT   Final Result      ANKLE BRACHIAL INDEX   Final Result      CT HEAD WO CONT   Final Result   1. Evolving left FREDO territory ischemic infarct. No acute hemorrhage. 2.  Paranasal sinus disease. XR FOOT LT AP/LAT   Final Result      DUPLEX UPPER EXT VENOUS RIGHT   Final Result      US ABD COMP   Final Result   Gallbladder sludge without other acute abnormality. XR CHEST PORT   Final Result   The cardiomediastinal silhouette is appropriate for age, technique,   and lung expansion. Pulmonary vasculature is not congested. The lungs are   essentially clear. No effusion or pneumothorax is seen. CT HEAD WO CONT   Final Result   Negative for acute intracranial process. CT ABD PELV WO CONT   Final Result   Hemorrhage within the urinary bladder. Mild left   hydroureteronephrosis. XR SWALLOW FUNC VIDEO   Final Result   Penetration with thin consistency. No aspiration. CTA HEAD NECK W WO CONT   Final Result   Negative head and neck CTA. Please note that all carotid bifurcation stenoses are measured as per NASCET   criteria. CT CODE NEURO HEAD WO CONTRAST   Final Result   1. No acute large vessel intracranial ischemia, hemorrhage. Called report to   patient's nurse Hazel Ochoa at 7:12 PM 2/22/2022.   2. Right basal ganglia lacunar infarct. 3. Periventricular microangiopathy. 4. Pansinusitis. See above. MRI BRAIN WO CONT   Final Result   Acute infarction left paracentral lobule and region of the white   matter of the left motor cortex. CT CODE NEURO HEAD WO CONTRAST   Final Result      1. No acute intracranial hemorrhage.    2. Left basal ganglia lacunar infarct, which is age indeterminate in the absence   of any prior outside studies, but favored to be chronic. 3. Moderate to severe paranasal sinus disease. Results called to Dr. Suyapa Reyes at 9:39 PM on 2/21/2022           Recent Results (from the past 24 hour(s))   GLUCOSE, POC    Collection Time: 03/12/22  3:40 PM   Result Value Ref Range    Glucose (POC) 253 (H) 65 - 117 mg/dL    Performed by Nasima Navarrete    GLUCOSE, POC    Collection Time: 03/12/22  9:23 PM   Result Value Ref Range    Glucose (POC) 225 (H) 65 - 117 mg/dL    Performed by Deanna Lambert    GLUCOSE, POC    Collection Time: 03/13/22  5:59 AM   Result Value Ref Range    Glucose (POC) 266 (H) 65 - 117 mg/dL    Performed by Jarett San + INR    Collection Time: 03/13/22  7:22 AM   Result Value Ref Range    Prothrombin time 16.1 (H) 11.9 - 14.6 sec    INR 1.3 (H) 0.9 - 1.1     HEPATIC FUNCTION PANEL    Collection Time: 03/13/22  7:22 AM   Result Value Ref Range    Protein, total 6.5 6.4 - 8.2 g/dL    Albumin 1.9 (L) 3.5 - 5.0 g/dL    Globulin 4.6 (H) 2.0 - 4.0 g/dL    A-G Ratio 0.4 (L) 1.1 - 2.2      Bilirubin, total 0.2 0.2 - 1.0 mg/dL    Bilirubin, direct <0.1 0.0 - 0.2 mg/dL    Alk.  phosphatase 75 45 - 117 U/L    AST (SGOT) 18 15 - 37 U/L    ALT (SGPT) 44 12 - 78 U/L   METABOLIC PANEL, BASIC    Collection Time: 03/13/22  7:22 AM   Result Value Ref Range    Sodium 148 (H) 136 - 145 mmol/L    Potassium 4.5 3.5 - 5.1 mmol/L    Chloride 116 (H) 97 - 108 mmol/L    CO2 28 21 - 32 mmol/L    Anion gap 4 (L) 5 - 15 mmol/L    Glucose 262 (H) 65 - 100 mg/dL    BUN 33 (H) 6 - 20 mg/dL    Creatinine 1.25 (H) 0.55 - 1.02 mg/dL    BUN/Creatinine ratio 26 (H) 12 - 20      GFR est AA 51 (L) >60 ml/min/1.73m2    GFR est non-AA 42 (L) >60 ml/min/1.73m2    Calcium 8.4 (L) 8.5 - 10.1 mg/dL   CBC WITH AUTOMATED DIFF    Collection Time: 03/13/22  7:22 AM   Result Value Ref Range    WBC 8.9 3.6 - 11.0 K/uL    RBC 2.64 (L) 3.80 - 5.20 M/uL    HGB 7.5 (L) 11.5 - 16.0 g/dL    HCT 24.3 (L) 35.0 - 47.0 %    MCV 92.0 80.0 - 99.0 FL    MCH 28.4 26.0 - 34.0 PG    MCHC 30.9 30.0 - 36.5 g/dL    RDW 15.9 (H) 11.5 - 14.5 %    PLATELET 833 804 - 746 K/uL    MPV 10.8 8.9 - 12.9 FL    NRBC 0.3 (H) 0.0  WBC    ABSOLUTE NRBC 0.03 (H) 0.00 - 0.01 K/uL    NEUTROPHILS 66 32 - 75 %    LYMPHOCYTES 25 12 - 49 %    MONOCYTES 5 5 - 13 %    EOSINOPHILS 2 0 - 7 %    BASOPHILS 1 0 - 1 %    IMMATURE GRANULOCYTES 1 (H) 0 - 0.5 %    ABS. NEUTROPHILS 5.9 1.8 - 8.0 K/UL    ABS. LYMPHOCYTES 2.2 0.8 - 3.5 K/UL    ABS. MONOCYTES 0.5 0.0 - 1.0 K/UL    ABS. EOSINOPHILS 0.2 0.0 - 0.4 K/UL    ABS. BASOPHILS 0.1 0.0 - 0.1 K/UL    ABS. IMM.  GRANS. 0.1 (H) 0.00 - 0.04 K/UL    DF AUTOMATED     GLUCOSE, POC    Collection Time: 03/13/22  7:49 AM   Result Value Ref Range    Glucose (POC) 272 (H) 65 - 117 mg/dL    Performed by Ana Keto    GLUCOSE, POC    Collection Time: 03/13/22 10:59 AM   Result Value Ref Range    Glucose (POC) 162 (H) 65 - 117 mg/dL    Performed by Ana Keto        Results     Procedure Component Value Units Date/Time    CULTURE, BLOOD [[id number]] Collected: 03/05/22 1016    Order Status: Completed Specimen: Blood Updated: 03/11/22 1152     Special Requests: --        No Special Requests  Left  Forearm       Culture result: No growth 6 days       CULTURE, WOUND Marshal Carte STAIN [[id number]]  (Susceptibility) Collected: 02/28/22 2015    Order Status: Completed Specimen: Wound Updated: 03/04/22 1000     Special Requests: No Special Requests        GRAM STAIN 2+ Gram Negative Rods         2+ Gram Positive Cocci        Culture result: Heavy Escherichia coli         Moderate  Mixed skin yasmine isolated       Susceptibility      Escherichia coli     GEMA     Amikacin ($) Susceptible     Ampicillin ($) Resistant     Ampicillin/sulbactam ($) Resistant     Cefazolin ($) Resistant     Cefepime ($$) Susceptible     Cefoxitin Susceptible     Ceftazidime ($) Susceptible     Ceftriaxone ($) Susceptible     Ciprofloxacin ($) Susceptible     Gentamicin ($) Resistant Levofloxacin ($) Susceptible     Meropenem ($$) Susceptible     Piperacillin/Tazobac ($) Susceptible     Tobramycin ($) Intermediate     Trimeth/Sulfa Resistant                  Linear View                          Labs:     Recent Labs     03/13/22 0722 03/12/22 0633   WBC 8.9 8.5   HGB 7.5* 7.6*   HCT 24.3* 25.4*    405*     Recent Labs     03/13/22 0722 03/12/22 0633   * 149*   K 4.5 4.5   * 117*   CO2 28 26   BUN 33* 37*   CREA 1.25* 1.37*   * 262*   CA 8.4* 8.4*     Recent Labs     03/13/22 0722 03/12/22 0633 03/11/22 0626   ALT 44 50 62   AP 75 72 79   TBILI 0.2 0.2 0.2   TP 6.5 6.2* 6.6   ALB 1.9* 1.8* 1.9*   GLOB 4.6* 4.4* 4.7*     Recent Labs     03/13/22 0722 03/12/22 0633 03/11/22  0626   INR 1.3* 1.4* 1.5*   PTP 16.1* 16.6* 17.5*      No results for input(s): FE, TIBC, PSAT, FERR in the last 72 hours. No results found for: FOL, RBCF   No results for input(s): PH, PCO2, PO2 in the last 72 hours. No results for input(s): CPK, CKNDX, TROIQ in the last 72 hours.     No lab exists for component: CPKMB  Lab Results   Component Value Date/Time    Cholesterol, total 124 03/08/2022 07:40 AM    HDL Cholesterol 30 03/08/2022 07:40 AM    LDL,Direct 79 06/28/2021 12:00 AM    LDL, calculated 44.8 03/08/2022 07:40 AM    Triglyceride 246 (H) 03/08/2022 07:40 AM    CHOL/HDL Ratio 4.1 03/08/2022 07:40 AM     Lab Results   Component Value Date/Time    Glucose (POC) 162 (H) 03/13/2022 10:59 AM    Glucose (POC) 272 (H) 03/13/2022 07:49 AM    Glucose (POC) 266 (H) 03/13/2022 05:59 AM    Glucose (POC) 225 (H) 03/12/2022 09:23 PM    Glucose (POC) 253 (H) 03/12/2022 03:40 PM     No results found for: COLOR, APPRN, SPGRU, REFSG, ARIEL, PROTU, GLUCU, KETU, BILU, UROU, VICTORIANO, LEUKU, GLUKE, EPSU, BACTU, WBCU, RBCU, CASTS, UCRY      Assessment:     Non-STEMI  PVD  Acute CVA  UTI  Anemia  RICARDO  Type 2 diabetes  Protein calorie malnutrition      Plan:     Continue IV Zosyn  Amlodipine 5 mg daily aspirin 81 mg daily ferrous sulfate 3-500 twice a day Neurontin 300 mg twice a day Lantus 50 units subcu at bedtime metoprolol 50 mg twice a day        Current Facility-Administered Medications:     insulin glargine (LANTUS) injection 50 Units, 50 Units, SubCUTAneous, QHS, Orestes Norman MD, 50 Units at 03/12/22 2151    loperamide (IMODIUM) capsule 2 mg, 2 mg, Oral, Q4H PRN, Elsy JACKSON MD, 2 mg at 03/12/22 1331    insulin lispro (HUMALOG) injection, , SubCUTAneous, AC&HS, Krystyna Trevino MD, 3 Units at 03/13/22 1111    glucose chewable tablet 16 g, 4 Tablet, Oral, PRN, Elsy JACKSON MD    glucagon (GLUCAGEN) injection 1 mg, 1 mg, IntraMUSCular, PRN, Krystyna Trevino MD    artificial saliva (MOUTH KOTE) 1 Spray, 1 Spray, Oral, TID, Fred Barnes MD, 1 Boomer at 03/13/22 1112    psyllium husk-aspartame (METAMUCIL FIBER) packet 1 Packet, 1 Packet, Per G Tube, BID, Rayray Covarrubias MD, 1 Packet at 03/13/22 1111    acidophilus-pectin, citrus tablet 2 Tablet, 2 Tablet, Oral, DAILY, Fred Barnes MD, 2 Tablet at 03/13/22 1111    aspirin chewable tablet 81 mg, 81 mg, Oral, DAILY, Shira Gregory MD, 81 mg at 03/13/22 1111    amLODIPine (NORVASC) tablet 5 mg, 5 mg, Oral, BID, Antoinette Finnegan MD, 5 mg at 03/13/22 1111    metoprolol succinate (TOPROL-XL) XL tablet 50 mg, 50 mg, Oral, BID, Antoinette Finnegan MD, 50 mg at 03/13/22 1111    acetaminophen (TYLENOL) solution 650 mg, 650 mg, Per G Tube, Q4H PRN, Elsy JACKSON MD, 650 mg at 03/13/22 1125    gabapentin (NEURONTIN) capsule 300 mg, 300 mg, Oral, BID, Kalpesh Lara MD, 300 mg at 03/13/22 1111    0.45% sodium chloride infusion, 25 mL/hr, IntraVENous, CONTINUOUS, Kalpesh Lara MD, Last Rate: 25 mL/hr at 03/09/22 1909, 25 mL/hr at 03/09/22 1909    0.9% sodium chloride infusion 250 mL, 250 mL, IntraVENous, PRN, Israel Soto MD    latanoprost (XALATAN) 0.005 % ophthalmic solution 1 Drop, 1 Drop, Right Eye, QPM, Johnathan Wesley MD, 1 Drop at 03/12/22 1819    0.9% sodium chloride infusion 250 mL, 250 mL, IntraVENous, PRN, Maryjane Louise MD    zinc oxide-white petrolatum 20-75 % topical paste, , Topical, BID, Imani JAKCSON MD, Given at 03/13/22 0900    lidocaine (XYLOCAINE) 4 % (40 mg/mL) topical solution, , Topical, PRN, Imani JACKSON MD, Given at 02/26/22 2207    [Held by provider] atorvastatin (LIPITOR) tablet 80 mg, 80 mg, Oral, DAILY, Cabrera Broderick MD, 80 mg at 02/26/22 0900    labetaloL (NORMODYNE;TRANDATE) 20 mg/4 mL (5 mg/mL) injection 10 mg, 10 mg, IntraVENous, Q6H PRN, Cabrera Broderick MD, 10 mg at 03/02/22 1224    sodium chloride (NS) flush 5-40 mL, 5-40 mL, IntraVENous, PRN, Orestes Norman MD    brimonidine (ALPHAGAN) 0.2 % ophthalmic solution 1 Drop, 1 Drop, Both Eyes, TID, Orestes Norman MD, 1 Drop at 03/13/22 1112    ferrous sulfate tablet 325 mg, 325 mg, Oral, BID, Orestes Norman MD, 325 mg at 03/13/22 1111    dextrose 10% infusion 125-250 mL, 125-250 mL, IntraVENous, PRN, Thiago Morales MD

## 2022-03-13 NOTE — PROGRESS NOTES
Problem: Mobility Impaired (Adult and Pediatric)  Goal: *Acute Goals and Plan of Care (Insert Text)  Description: Revised 3/4/22  Pt will be I with LE HEP in 7 days. Pt will perform bed mobility with min Ax1 in 7 days. Pt will perform transfers with min Ax1 in 7 days. Pt will amb 5-10 feet with LRAD safely with min Ax1 in 7 days. Pt will demonstrate improvement in dynamic seated and standing balance with min A in 7 days. Outcome: Not Progressing Towards Goal   PHYSICAL THERAPY TREATMENT  Patient: Misha Joe (89 y.o. female)  Date: 3/13/2022  Diagnosis: CVA (cerebral vascular accident) ([facility] [address] [I63.9]  Elevated troponin [R77.8] <principal problem not specified>  Procedure(s) (LRB):  PERCUTANEOUS ENDOSCOPIC GASTROSTOMY TUBE INSERTION (N/A)  ESOPHAGOGASTRODUODENOSCOPY (EGD) (N/A) 11 Days Post-Op  Precautions:    Chart, physical therapy assessment, plan of care and goals were reviewed. ASSESSMENT  Patient continues with skilled PT services and is not progressing towards goals. Co-treat with OT due to level of assist needed for bed mobility and transfers. Upon entry into room, patient semi-supine in bed. Noted that patient's PEG tube was leaking so notified nursing. Patient also had a BM in bed so performed rolling multiple times, total A, to clean patient and replace dara pads. PROM of LEs for hip and knee flexion in semi-supine. Total A to scoot to [facility] and position to comfort at end of session. Co-treat necessary due to level of assist needed for bed mobility and transfers 2/2 severity of deficits. Current Level of Function Impacting Discharge (mobility/balance): decr mobility, decr strength     Other factors to consider for discharge: severity of deficits, CVA, medical hx, assist x 2         PLAN :  Patient continues to benefit from skilled intervention to address the above impairments. Continue treatment per established plan of care. to address goals.     Recommendation for discharge: (in order for the patient to meet his/her long term goals)  Elie Patel    This discharge recommendation:  Has been made in collaboration with the attending provider and/or case management    IF patient discharges home will need the following DME: to be determined (TBD)       SUBJECTIVE:   Patient unable to verbalize at all throughout session     OBJECTIVE DATA SUMMARY:   Critical Behavior:  Neurologic State: Drowsy (Simultaneous filing. User may not have seen previous data.)  Orientation Level: Unable to verbalize (Simultaneous filing. User may not have seen previous data.)  Cognition: Unable to assess (comment)  Safety/Judgement: Decreased insight into deficits  Functional Mobility Training:  Bed Mobility:  Rolling: Total assistance;Assist x2   Patient unable to hold herself in S/L      Scooting: Total assistance;Assist x2    Therapeutic Exercises:   10x PROM derik hip and knee flexion with patient in semi-supine     Pain Rating:  Unable to verbalize     Activity Tolerance:   Poor and requires frequent rest breaks  Please refer to the flowsheet for vital signs taken during this treatment. After treatment patient left in no apparent distress:   Patient positioned in R sidelying for pressure relief, Call bell within reach, Bed / chair alarm activated, and Side rails x 3    COMMUNICATION/COLLABORATION:   The patients plan of care was discussed with: Physical therapist, Occupational therapy assistant, and Registered nurse.      Rosina Houston   Time Calculation: 26 mins

## 2022-03-13 NOTE — PROGRESS NOTES
Patient turned every two hours, no BM, mouth care done. Problem: Patient Education: Go to Patient Education Activity  Goal: Patient/Family Education  Outcome: Progressing Towards Goal     Problem: Falls - Risk of  Goal: *Absence of Falls  Description: Document [place] Fall Risk and appropriate interventions in the flowsheet.   Outcome: Progressing Towards Goal  Note: Fall Risk Interventions:       Mentation Interventions: Bed/chair exit alarm,Adequate sleep, hydration, pain control,Room close to nurse's station,Toileting rounds    Medication Interventions: Bed/chair exit alarm    Elimination Interventions: Call light in reach,Bed/chair exit alarm    History of Falls Interventions: Bed/chair exit alarm,Room close to nurse's station         Problem: Patient Education: Go to Patient Education Activity  Goal: Patient/Family Education  Outcome: Progressing Towards Goal     Problem: Patient Education: Go to Patient Education Activity  Goal: Patient/Family Education  Outcome: Progressing Towards Goal     Problem: TIA/CVA Stroke: 0-24 hours  Goal: Off Pathway (Use only if patient is Off Pathway)  Outcome: Progressing Towards Goal  Goal: Activity/Safety  Outcome: Progressing Towards Goal  Goal: Consults, if ordered  Outcome: Progressing Towards Goal  Goal: Diagnostic Test/Procedures  Outcome: Progressing Towards Goal  Goal: Nutrition/Diet  Outcome: Progressing Towards Goal  Goal: Discharge Planning  Outcome: Progressing Towards Goal  Goal: Medications  Outcome: Progressing Towards Goal  Goal: Respiratory  Outcome: Progressing Towards Goal  Goal: Treatments/Interventions/Procedures  Outcome: Progressing Towards Goal  Goal: Minimize risk of bleeding post-thrombolytic infusion  Outcome: Progressing Towards Goal  Goal: Monitor for complications post-thrombolytic infusion  Outcome: Progressing Towards Goal  Goal: Psychosocial  Outcome: Progressing Towards Goal  Goal: *Hemodynamically stable  Outcome: Progressing Towards Goal  Goal: *Neurologically stable  Description: Absence of additional neurological deficits    Outcome: Progressing Towards Goal  Goal: *Verbalizes anxiety and depression are reduced or absent  Outcome: Progressing Towards Goal  Goal: *Absence of Signs of Aspiration on Current Diet  Outcome: Progressing Towards Goal  Goal: *Absence of deep venous thrombosis signs and symptoms(Stroke Metric)  Outcome: Progressing Towards Goal  Goal: *Ability to perform ADLs and demonstrates progressive mobility and function  Outcome: Progressing Towards Goal  Goal: *Stroke education started(Stroke Metric)  Outcome: Progressing Towards Goal  Goal: *Dysphagia screen performed(Stroke Metric)  Outcome: Progressing Towards Goal  Goal: *Rehab consulted(Stroke Metric)  Outcome: Progressing Towards Goal     Problem: TIA/CVA Stroke: 0-24 hours  Goal: Off Pathway (Use only if patient is Off Pathway)  Outcome: Progressing Towards Goal  Goal: Activity/Safety  Outcome: Progressing Towards Goal  Goal: Consults, if ordered  Outcome: Progressing Towards Goal  Goal: Diagnostic Test/Procedures  Outcome: Progressing Towards Goal  Goal: Nutrition/Diet  Outcome: Progressing Towards Goal  Goal: Discharge Planning  Outcome: Progressing Towards Goal  Goal: Medications  Outcome: Progressing Towards Goal  Goal: Respiratory  Outcome: Progressing Towards Goal  Goal: Treatments/Interventions/Procedures  Outcome: Progressing Towards Goal  Goal: Minimize risk of bleeding post-thrombolytic infusion  Outcome: Progressing Towards Goal  Goal: Monitor for complications post-thrombolytic infusion  Outcome: Progressing Towards Goal  Goal: Psychosocial  Outcome: Progressing Towards Goal  Goal: *Hemodynamically stable  Outcome: Progressing Towards Goal  Goal: *Neurologically stable  Description: Absence of additional neurological deficits    Outcome: Progressing Towards Goal  Goal: *Verbalizes anxiety and depression are reduced or absent  Outcome: Progressing Towards Goal  Goal: *Absence of Signs of Aspiration on Current Diet  Outcome: Progressing Towards Goal  Goal: *Absence of deep venous thrombosis signs and symptoms(Stroke Metric)  Outcome: Progressing Towards Goal  Goal: *Ability to perform ADLs and demonstrates progressive mobility and function  Outcome: Progressing Towards Goal  Goal: *Stroke education started(Stroke Metric)  Outcome: Progressing Towards Goal  Goal: *Dysphagia screen performed(Stroke Metric)  Outcome: Progressing Towards Goal  Goal: *Rehab consulted(Stroke Metric)  Outcome: Progressing Towards Goal

## 2022-03-13 NOTE — PROGRESS NOTES
Patient could benefit from a specialty bed due to new skin breakdown. Nurse noted wound care on avatar. Applied zinc guard for protection.

## 2022-03-13 NOTE — PROGRESS NOTES
Problem: Self Care Deficits Care Plan (Adult)  Goal: *Acute Goals and Plan of Care (Insert Text)  Description: 1. Pt will be min A sup <> sit in prep for EOB ADLs  2. Pt will be mod I grooming sitting EOB  3. Pt will be min A sit <> stand/stand pivot transfer in prep for toileting LRAD  4. Pt will be min A toileting/toilet transfer/cloth mgmt LRAD  6. Pt will be mod I following UE HEP in prep for self care tasks      Outcome: Progressing Towards Goal     Problem: Patient Education: Go to Patient Education Activity  Goal: Patient/Family Education  Outcome: Progressing Towards Goal   OCCUPATIONAL THERAPY TREATMENT  Patient: Arik Shah (32 y.o. female)  Date: 3/13/2022  Diagnosis: CVA (cerebral vascular accident) ([facility] [address] [I63.9]  Elevated troponin [R77.8] <principal problem not specified>  Procedure(s) (LRB):  PERCUTANEOUS ENDOSCOPIC GASTROSTOMY TUBE INSERTION (N/A)  ESOPHAGOGASTRODUODENOSCOPY (EGD) (N/A) 11 Days Post-Op  Precautions:    Chart, occupational therapy assessment, plan of care, and goals were reviewed. ASSESSMENT  Patient continues with skilled OT services and is not progressing towards goals. Patient received supine in bed with eyes opening to stimulus but unable to verbalize. Patient noted to have tube feeding on gown, sheets, and dripping onto floor due to partially opened valve. Placed tube feeding on hold, notified nurse, and assisted with cleaning patient up. Patient was total Ax2 to roll side to side in bed multiple times to change sheets, padding, as well as perform bowel hygiene. Total A required as well to change patient hospital gown supine in bed. Pt was scooted towards HOB and left with wedge placed on L side for pressure relief. PROM to BLEs performed 1 set 10 reps. Pt was left with call bell within reach and side railsx3.      Current Level of Function Impacting Discharge (ADLs): functional limitations, Ax2    Other factors to consider for discharge: time since onset         PLAN :  Patient continues to benefit from skilled intervention to address the above impairments. Continue treatment per established plan of care. to address goals. Recommendation for discharge: (in order for the patient to meet his/her long term goals)  Elie Patel    This discharge recommendation:  Has been made in collaboration with the attending provider and/or case management    IF patient discharges home will need the following DME: hospital bed       SUBJECTIVE:   Patient nonverbal throughout treatment with eyes opened to stimulus. OBJECTIVE DATA SUMMARY:   Cognitive/Behavioral Status:  Neurologic State: Drowsy (Simultaneous filing. User may not have seen previous data.)  Orientation Level: Unable to verbalize (Simultaneous filing. User may not have seen previous data.)  Cognition: Unable to assess (comment)    Functional Mobility and Transfers for ADLs:  Bed Mobility:  Rolling: Total assistance;Assist x2  Scooting: Total assistance;Assist x2    ADL Intervention:    Upper Body [address]: Total assistance (dependent)    Toileting  Bladder Hygiene: Total assistance (dependent)  Bowel Hygiene: Total assistance (dependent)    Therapeutic Exercises:   PROM to BLE    Pain:  Unable to verbalize    Activity Tolerance:   Poor  Please refer to the flowsheet for vital signs taken during this treatment. After treatment patient left in no apparent distress:   Supine in bed, Patient positioned in R sidelying for pressure relief, Call bell within reach, Bed / chair alarm activated, and Side rails x 3    COMMUNICATION/COLLABORATION:   The patients plan of care was discussed with: Physical therapy assistant and Registered nurse. Co treatment provided with PTA this date for increased clinician and patient safety due to functional limitations of patient.      Philomena Sawant  Time Calculation: 26 mins

## 2022-03-14 NOTE — PROGRESS NOTES
Patient given CHG bath, turned ever 2 hours per doctor's order in epic,mouth care done, feet and sacral wound care done  and one of the patient's daughter stated that the day time RN/LPN [id number] (DIMA Clayton/ Diana Lucero) had explained to the patient's daughter that her mum would be turned every two hours. One of patient's daughter stayed over night with her mum. Problem: Patient Education: Go to Patient Education Activity  Goal: Patient/Family Education  Outcome: Progressing Towards Goal     Problem: Falls - Risk of  Goal: *Absence of Falls  Description: Document Dennie Oak Fall Risk and appropriate interventions in the flowsheet.   Outcome: Progressing Towards Goal  Note: Fall Risk Interventions:       Mentation Interventions: Bed/chair exit alarm    Medication Interventions: Bed/chair exit alarm    Elimination Interventions: Call light in reach    History of Falls Interventions: Bed/chair exit alarm         Problem: Patient Education: Go to Patient Education Activity  Goal: Patient/Family Education  Outcome: Progressing Towards Goal     Problem: Patient Education: Go to Patient Education Activity  Goal: Patient/Family Education  Outcome: Progressing Towards Goal     Problem: TIA/CVA Stroke: 0-24 hours  Goal: Off Pathway (Use only if patient is Off Pathway)  Outcome: Progressing Towards Goal  Goal: Activity/Safety  Outcome: Progressing Towards Goal  Goal: Consults, if ordered  Outcome: Progressing Towards Goal  Goal: Diagnostic Test/Procedures  Outcome: Progressing Towards Goal  Goal: Nutrition/Diet  Outcome: Progressing Towards Goal  Goal: Discharge Planning  Outcome: Progressing Towards Goal  Goal: Medications  Outcome: Progressing Towards Goal  Goal: Respiratory  Outcome: Progressing Towards Goal  Goal: Treatments/Interventions/Procedures  Outcome: Progressing Towards Goal  Goal: Minimize risk of bleeding post-thrombolytic infusion  Outcome: Progressing Towards Goal  Goal: Monitor for complications post-thrombolytic infusion  Outcome: Progressing Towards Goal  Goal: Psychosocial  Outcome: Progressing Towards Goal  Goal: *Hemodynamically stable  Outcome: Progressing Towards Goal  Goal: *Neurologically stable  Description: Absence of additional neurological deficits    Outcome: Progressing Towards Goal  Goal: *Verbalizes anxiety and depression are reduced or absent  Outcome: Progressing Towards Goal  Goal: *Absence of Signs of Aspiration on Current Diet  Outcome: Progressing Towards Goal  Goal: *Absence of deep venous thrombosis signs and symptoms(Stroke Metric)  Outcome: Progressing Towards Goal  Goal: *Ability to perform ADLs and demonstrates progressive mobility and function  Outcome: Progressing Towards Goal  Goal: *Stroke education started(Stroke Metric)  Outcome: Progressing Towards Goal  Goal: *Dysphagia screen performed(Stroke Metric)  Outcome: Progressing Towards Goal  Goal: *Rehab consulted(Stroke Metric)  Outcome: Progressing Towards Goal     Problem: TIA/CVA Stroke: Day 2 Until Discharge  Goal: Off Pathway (Use only if patient is Off Pathway)  Outcome: Progressing Towards Goal  Goal: Activity/Safety  Outcome: Progressing Towards Goal  Goal: Diagnostic Test/Procedures  Outcome: Progressing Towards Goal  Goal: Nutrition/Diet  Outcome: Progressing Towards Goal  Goal: Discharge Planning  Outcome: Progressing Towards Goal  Goal: Medications  Outcome: Progressing Towards Goal  Goal: Respiratory  Outcome: Progressing Towards Goal  Goal: Treatments/Interventions/Procedures  Outcome: Progressing Towards Goal  Goal: Psychosocial  Outcome: Progressing Towards Goal  Goal: *Verbalizes anxiety and depression are reduced or absent  Outcome: Progressing Towards Goal  Goal: *Absence of aspiration  Outcome: Progressing Towards Goal  Goal: *Absence of deep venous thrombosis signs and symptoms(Stroke Metric)  Outcome: Progressing Towards Goal  Goal: *Optimal pain control at patient's stated goal  Outcome: Progressing Towards Goal  Goal: *Tolerating diet  Outcome: Progressing Towards Goal  Goal: *Ability to perform ADLs and demonstrates progressive mobility and function  Outcome: Progressing Towards Goal  Goal: *Stroke education continued(Stroke Metric)  Outcome: Progressing Towards Goal     Problem: Patient Education: Go to Patient Education Activity  Goal: Patient/Family Education  Outcome: Progressing Towards Goal     Problem: Patient Education: Go to Patient Education Activity  Goal: Patient/Family Education  Outcome: Progressing Towards Goal

## 2022-03-14 NOTE — DISCHARGE SUMMARY
Discharge Summary     Patient: Josefa Lopez MRN: [id number]  SSN: [id number]    YOB: 1945  Age: 78 y.o.   Sex: female       Admit Date: 2/21/2022    Discharge Date: 3/14/2022      Admission Diagnoses: CVA (cerebral vascular accident) [facility]) [I63.9]  Elevated troponin [R77.8]    Discharge Diagnoses:   Problem List as of 3/14/2022 Date Reviewed: 3/2/2022          Codes Class Noted - Resolved    CVA (cerebral vascular accident) [facility]) ICD-10-CM: I63.9  ICD-9-CM: 434.91  2/21/2022 - Present        Elevated troponin ICD-10-CM: R77.8  ICD-9-CM: 790.6  2/21/2022 - Present        Ischemia of both lower extremities ICD-10-CM: I99.8  ICD-9-CM: 459.89  7/22/2020 - Present        Pain in both lower legs ICD-10-CM: M79.661, M79.662  ICD-9-CM: 729.5  7/22/2020 - Present        Acute osteomyelitis of ankle or foot ([facility] ICD-10-CM: M86.179  ICD-9-CM: 730.07  4/4/2019 - Present        Diabetic peripheral neuropathy ([facility] ICD-10-CM: E11.42  ICD-9-CM: 250.60, 357.2  4/4/2019 - Present        Spinal stenosis in cervical region ICD-10-CM: M48.02  ICD-9-CM: 723.0  4/4/2019 - Present        Type 2 diabetes mellitus with nephropathy ([facility] ICD-10-CM: E11.21  ICD-9-CM: 250.40, 583.81  1/31/2018 - Present        DM type 2 causing vascular disease ([facility] ICD-10-CM: E11.59  ICD-9-CM: 250.70, 443.81  7/27/2017 - Present        Hypercalcemia ICD-10-CM: P50.99  ICD-9-CM: 275.42  12/12/2016 - Present        PAD (peripheral artery disease) ([facility] ICD-10-CM: I73.9  ICD-9-CM: 443.9  4/11/2016 - Present        Infection of nailbed of toe of left foot ICD-10-CM: C84.900  ICD-9-CM: 681.11  9/10/2015 - Present        HTN (hypertension) ICD-10-CM: I10  ICD-9-CM: 401.9  Unknown - Present        Hyperlipidemia ICD-10-CM: E78.5  ICD-9-CM: 272.4  Unknown - Present        Neuropathy ICD-10-CM: G62.9  ICD-9-CM: 355.9  Unknown - Present        Sciatica ICD-10-CM: M54.30  ICD-9-CM: 724.3  Unknown - Present        Diabetes mellitus with neurological manifestations, uncontrolled ([facility] ICD-10-CM: E11.49, E11.65  ICD-9-CM: 250.62  8/8/2013 - Present               Discharge Condition: Good    Hospital Course: 78years old patient who was recently discharged from [facility] for acute CVA presented with a complaint of increased weakness on the right side and change in mental status patient has a past history of diabetes history of peripheral vascular disease history of high blood pressure chronic renal disease hyperlipidemia, sciatica,. Patient was seen by the neurologist and had MRI of the brain which showed acute infarction of the left paracentral lobulel medial region of the white matter of the left motor cortex patient was initially on Plavix and this was changed by the neurologist to Xarelto 2.5 mg p.o. twice a day and daily aspirin. Patient had an episode of hematuria and had continuous bladder irrigation seen by the urologist anticoagulations were discontinued by the neurologist.Patient also showed elevated troponin I about 700 patient was seen by cardiologist medical treatment was advised. Patient was also seen by the podiatrist for gangrene of the foot was treated medically and conservatively. Patient was seen by PT and OT and patient is essentially bedbound. She was treated for UTI and seen by infectious disease physician. Patient family requested transfer to [facility].   He also declined placement in a skilled nursing facility and are considering discharged with home health if patient is not accepted at [facility] patient will require 24-hour care service and extensive family meeting was conducted with family with continuous update of the patient's situation patient was seen by nephrology for acute renal failure on chronic renal failure and had PEG tube placement by the GI physician she is on enteral feeds    Consults: Cardiology, Infectious Disease, Nephrology and Physical Medicine and Rehabilitation    Significant Diagnostic Studies: labs:   Recent Results (from the past 24 hour(s))   GLUCOSE, POC    Collection Time: 03/13/22  4:40 PM   Result Value Ref Range    Glucose (POC) 249 (H) 65 - 117 mg/dL    Performed by [address] [place] [address], POC    Collection Time: 03/13/22  9:57 PM   Result Value Ref Range    Glucose (POC) 222 (H) 65 - 117 mg/dL    Performed by VICKEY LEMONS    GLUCOSE, POC    Collection Time: 03/14/22  5:37 AM   Result Value Ref Range    Glucose (POC) 211 (H) 65 - 117 mg/dL    Performed by VICKEY LEMONS    PROTHROMBIN TIME + INR    Collection Time: 03/14/22  7:20 AM   Result Value Ref Range    Prothrombin time 16.4 (H) 11.9 - 14.6 sec    INR 1.4 (H) 0.9 - 1.1     HEPATIC FUNCTION PANEL    Collection Time: 03/14/22  7:20 AM   Result Value Ref Range    Protein, total 6.3 (L) 6.4 - 8.2 g/dL    Albumin 1.9 (L) 3.5 - 5.0 g/dL    Globulin 4.4 (H) 2.0 - 4.0 g/dL    A-G Ratio 0.4 (L) 1.1 - 2.2      Bilirubin, total 0.2 0.2 - 1.0 mg/dL    Bilirubin, direct <0.1 0.0 - 0.2 mg/dL    Alk.  phosphatase 72 45 - 117 U/L    AST (SGOT) 23 15 - 37 U/L    ALT (SGPT) 37 12 - 78 U/L   METABOLIC PANEL, BASIC    Collection Time: 03/14/22  7:20 AM   Result Value Ref Range    Sodium 144 136 - 145 mmol/L    Potassium 4.4 3.5 - 5.1 mmol/L    Chloride 113 (H) 97 - 108 mmol/L    CO2 27 21 - 32 mmol/L    Anion gap 4 (L) 5 - 15 mmol/L    Glucose 207 (H) 65 - 100 mg/dL    BUN 30 (H) 6 - 20 mg/dL    Creatinine 1.10 (H) 0.55 - 1.02 mg/dL    BUN/Creatinine ratio 27 (H) 12 - 20      GFR est AA 59 (L) >60 ml/min/1.73m2    GFR est non-AA 48 (L) >60 ml/min/1.73m2    Calcium 8.4 (L) 8.5 - 10.1 mg/dL   CBC WITH AUTOMATED DIFF    Collection Time: 03/14/22  7:20 AM   Result Value Ref Range    WBC 8.4 3.6 - 11.0 K/uL    RBC 2.74 (L) 3.80 - 5.20 M/uL    HGB 7.7 (L) 11.5 - 16.0 g/dL    HCT 25.0 (L) 35.0 - 47.0 %    MCV 91.2 80.0 - 99.0 FL    MCH 28.1 26.0 - 34.0 PG    MCHC 30.8 30.0 - 36.5 g/dL    RDW 15.7 (H) 11.5 - 14.5 %    PLATELET 717 059 - 400 K/uL    MPV 10.8 8.9 - 12.9 FL    NRBC 0.5 (H) 0.0  WBC    ABSOLUTE NRBC 0.04 (H) 0.00 - 0.01 K/uL    NEUTROPHILS 66 32 - 75 %    LYMPHOCYTES 23 12 - 49 %    MONOCYTES 7 5 - 13 %    EOSINOPHILS 2 0 - 7 %    BASOPHILS 1 0 - 1 %    IMMATURE GRANULOCYTES 1 (H) 0 - 0.5 %    ABS. NEUTROPHILS 5.6 1.8 - 8.0 K/UL    ABS. LYMPHOCYTES 1.9 0.8 - 3.5 K/UL    ABS. MONOCYTES 0.6 0.0 - 1.0 K/UL    ABS. EOSINOPHILS 0.2 0.0 - 0.4 K/UL    ABS. BASOPHILS 0.1 0.0 - 0.1 K/UL    ABS. IMM. GRANS. 0.1 (H) 0.00 - 0.04 K/UL    DF AUTOMATED     GLUCOSE, POC    Collection Time: 03/14/22 12:04 PM   Result Value Ref Range    Glucose (POC) 235 (H) 65 - 117 mg/dL    Performed by Cassandra Ochoa          DUPLEX LOWER EXT ARTERY BILAT   Final Result      ANKLE BRACHIAL INDEX   Final Result      CT HEAD WO CONT   Final Result   1. Evolving left FREDO territory ischemic infarct. No acute hemorrhage. 2.  Paranasal sinus disease. XR FOOT LT AP/LAT   Final Result      DUPLEX UPPER EXT VENOUS RIGHT   Final Result      US ABD COMP   Final Result   Gallbladder sludge without other acute abnormality. XR CHEST PORT   Final Result   The cardiomediastinal silhouette is appropriate for age, technique,   and lung expansion. Pulmonary vasculature is not congested. The lungs are   essentially clear. No effusion or pneumothorax is seen. CT HEAD WO CONT   Final Result   Negative for acute intracranial process. CT ABD PELV WO CONT   Final Result   Hemorrhage within the urinary bladder. Mild left   hydroureteronephrosis. XR SWALLOW FUNC VIDEO   Final Result   Penetration with thin consistency. No aspiration. CTA HEAD NECK W WO CONT   Final Result   Negative head and neck CTA. Please note that all carotid bifurcation stenoses are measured as per NASCET   criteria. CT CODE NEURO HEAD WO CONTRAST   Final Result   1. No acute large vessel intracranial ischemia, hemorrhage.  Called report to   patient's nurse Lynnmelisa Marin at 7:12 PM 2/22/2022.   2. Right basal ganglia lacunar infarct. 3. Periventricular microangiopathy. 4. Pansinusitis. See above. MRI BRAIN WO CONT   Final Result   Acute infarction left paracentral lobule and region of the white   matter of the left motor cortex. CT CODE NEURO HEAD WO CONTRAST   Final Result      1. No acute intracranial hemorrhage. 2. Left basal ganglia lacunar infarct, which is age indeterminate in the absence   of any prior outside studies, but favored to be chronic. 3. Moderate to severe paranasal sinus disease. Results called to Dr. Noble Romberg at 9:39 PM on 2/21/2022          Disposition: As directed by family    Discharge Medications:   Current Discharge Medication List      CONTINUE these medications which have NOT CHANGED    Details   atorvastatin (LIPITOR) 20 mg tablet TAKE 1 TABLET BY MOUTH EVERY NIGHT  Qty: 30 Tablet, Refills: 5    Associated Diagnoses: Type II or unspecified type diabetes mellitus with neurological manifestations, uncontrolled(250.62) ([address] Essential hypertension; Hyperlipidemia, unspecified hyperlipidemia type; Vitamin D deficiency; Vitamin B12 deficiency; Neuropathy; Type 2 diabetes mellitus with hyperglycemia, with long-term current use of insulin ([address] Diabetes mellitus with neurological manifestations, uncontrolled ([address] Mixed hyperlipidemia; PAD (peripheral artery disease) ([address] Hypercalcemia; Uncontrolled type 2 diabetes mellitus with hyperglycemia, with long-term current use of insulin (HCC)      gabapentin (NEURONTIN) 300 mg capsule TAKE 2 CAPSULES BY MOUTH THREE TIMES DAILY WITH MEALS  Qty: 180 Capsule, Refills: 2    Associated Diagnoses: Diabetes mellitus with neurological manifestations, uncontrolled ([address] Essential hypertension; PAD (peripheral artery disease) ([address] Mixed hyperlipidemia; Type 2 diabetes mellitus with hyperglycemia, with long-term current use of insulin ([address] Hypercalcemia;  Uncontrolled type 2 diabetes mellitus with hyperglycemia, with long-term current use of insulin ([address] Type II or unspecified type diabetes mellitus with neurological manifestations, uncontrolled(250.62) ([address] Hyperlipidemia, unspecified hyperlipidemia type; Vitamin D deficiency; Vitamin B12 deficiency; Neuropathy      Blood-Glucose Meter (OneTouch Verio Flex meter) misc Test blood glucose 3 time daily Dx Code: E11.65  Qty: 1 Each, Refills: 0    Comments: msg received that accu check and onetouch are preferred. If onetouch verio does not go through please change to onetouch ultra. Thanks! Associated Diagnoses: Type 2 diabetes mellitus with hyperglycemia, with long-term current use of insulin ([facility])      glucose blood VI test strips (OneTouch Verio test strips) strip Test blood glucose 3 time daily Dx Code: E11.65  Qty: 300 Strip, Refills: 3    Comments: msg received that accu check and onetouch are preferred. If onetouch verio does not go through please change to onetouch ultra. Thanks! Associated Diagnoses: Type 2 diabetes mellitus with hyperglycemia, with long-term current use of insulin (HCC)      lancets (OneTouch Delica Plus Lancet) 33 gauge misc Test blood glucose 3 time daily Dx Code: E11.65  Qty: 300 Lancet, Refills: 3    Comments: msg received that accu check and onetouch are preferred. If onetouch verio does not go through please change to onetouch ultra. Thanks! Associated Diagnoses: Type 2 diabetes mellitus with hyperglycemia, with long-term current use of insulin ([facility])      !! BD [place] 2nd Gen Pen Needle 32 gauge x 5/32\" ndle USE TO INJECT LANTUS AND VICTOZA DAILY  Qty: 200 Pen Needle, Refills: 4    Associated Diagnoses: Uncontrolled type 2 diabetes mellitus with hyperglycemia, with long-term current use of insulin ([address] Essential hypertension; Type 2 diabetes mellitus with hyperglycemia, with long-term current use of insulin ([address] Diabetes mellitus with neurological manifestations, uncontrolled ([address]  Mixed hyperlipidemia; PAD (peripheral artery disease) ([facility] Hypercalcemia; Type II or unspecified type diabetes mellitus with neurological manifestations, uncontrolled(250.62) ([facility] Hyperlipidemia, unspecified hyperlipidemia type; Vitamin D deficiency; Vitamin B12 deficiency; Neuropathy      insulin glargine (Lantus Solostar U-100 Insulin) 100 unit/mL (3 mL) inpn 22 Units by SubCUTAneous route daily. Qty: 30 mL, Refills: 2    Associated Diagnoses: Diabetes mellitus with neurological manifestations, uncontrolled ([facility] Essential hypertension; PAD (peripheral artery disease) ([facility] Mixed hyperlipidemia; Type 2 diabetes mellitus with hyperglycemia, with long-term current use of insulin ([facility] Hypercalcemia; Uncontrolled type 2 diabetes mellitus with hyperglycemia, with long-term current use of insulin ([facility] Type II or unspecified type diabetes mellitus with neurological manifestations, uncontrolled(250.62) ([facility] Hyperlipidemia, unspecified hyperlipidemia type; Vitamin D deficiency; Vitamin B12 deficiency; Neuropathy      !! liraglutide (Victoza 3-Sheldon) 0.6 mg/0.1 mL (18 mg/3 mL) pnij ADMINISTER 1.8 MG UNDER THE SKIN DAILY. Qty: 27 mL, Refills: 3    Associated Diagnoses: Diabetes mellitus with neurological manifestations, uncontrolled ([facility] Essential hypertension; PAD (peripheral artery disease) ([facility] Mixed hyperlipidemia; Type 2 diabetes mellitus with hyperglycemia, with long-term current use of insulin ([facility] Hypercalcemia; Uncontrolled type 2 diabetes mellitus with hyperglycemia, with long-term current use of insulin ([facility] Type II or unspecified type diabetes mellitus with neurological manifestations, uncontrolled(250.62) ([address] Hyperlipidemia, unspecified hyperlipidemia type; Vitamin D deficiency; Vitamin B12 deficiency;  Neuropathy      dilTIAZem ER (CARDIZEM CD) 180 mg capsule TAKE 1 CAPSULE BY MOUTH DAILY  Qty: 90 Cap, Refills: 0      brimonidine (ALPHAGAN) 0.2 % ophthalmic solution Administer 1 Drop to both eyes three (3) times daily.      aspirin delayed-release 81 mg tablet Take  by mouth daily. metFORMIN (GLUCOPHAGE) 1,000 mg tablet TAKE 1/2 TABLET BY MOUTH TWICE DAILY WITH MEALS  Qty: 180 Tab, Refills: 1    Associated Diagnoses: Type 2 diabetes mellitus with hyperglycemia, with long-term current use of insulin ([address] Essential hypertension; Diabetes mellitus with neurological manifestations, uncontrolled ([address] Mixed hyperlipidemia; PAD (peripheral artery disease) ([address] Hypercalcemia; Uncontrolled type 2 diabetes mellitus with hyperglycemia, with long-term current use of insulin ([address] Type II or unspecified type diabetes mellitus with neurological manifestations, uncontrolled(250.62) ([address] Hyperlipidemia, unspecified hyperlipidemia type; Vitamin D deficiency; Vitamin B12 deficiency; Neuropathy      losartan (COZAAR) 50 mg tablet TK 2 TS PO QD IN THE EVENING  Refills: 1      !! VICTOZA 3-BANDAR 0.6 mg/0.1 mL (18 mg/3 mL) pnij ADMINISTER 1.8 MG UNDER THE SKIN EVERY MORNING  Qty: 9 mL, Refills: 2    Associated Diagnoses: Essential hypertension; Type 2 diabetes mellitus with hyperglycemia, with long-term current use of insulin ([address] Diabetes mellitus with neurological manifestations, uncontrolled ([address] Mixed hyperlipidemia; PAD (peripheral artery disease) ([address] Hypercalcemia; Uncontrolled type 2 diabetes mellitus with hyperglycemia, with long-term current use of insulin ([address] Type II or unspecified type diabetes mellitus with neurological manifestations, uncontrolled(250.62) ([address] Hyperlipidemia, unspecified hyperlipidemia type; Vitamin D deficiency; Vitamin B12 deficiency; Neuropathy      clopidogrel (PLAVIX) 75 mg tab Take 75 mg by mouth daily.       !! CHELY PEN NEEDLE 32 gauge x 5/32\" ndle USE TO INJECT LANTUS AND VICTOZA EVERY DAY  Qty: 200 Pen Needle, Refills: 11    Associated Diagnoses: Uncontrolled type 2 diabetes mellitus with hyperglycemia, with long-term current use of insulin (HCC)      ferrous sulfate 325 mg (65 mg iron) tablet Take 1 Tab by mouth two (2) times a day. Qty: 60 Tab, Refills: 5    Associated Diagnoses: Diabetes mellitus with neurological manifestations, uncontrolled ([address] Essential hypertension; PAD (peripheral artery disease) ([address] Mixed hyperlipidemia; Type 2 diabetes mellitus with hyperglycemia, with long-term current use of insulin ([address] Hypercalcemia; Uncontrolled type 2 diabetes mellitus with hyperglycemia, with long-term current use of insulin ([address] Type II or unspecified type diabetes mellitus with neurological manifestations, uncontrolled(250.62) ([address] Hyperlipidemia, unspecified hyperlipidemia type; Vitamin D deficiency; Vitamin B12 deficiency; Neuropathy      polyethylene glycol (MIRALAX) 17 gram packet Take 1 Packet by mouth daily. Qty: 30 Packet, Refills: 1    Associated Diagnoses: Type II diabetes mellitus, uncontrolled ([address] Essential hypertension with goal blood pressure less than 140/90; Hyperlipidemia, unspecified hyperlipidemia type; Neuropathy      oxyCODONE IR (ROXICODONE) 5 mg immediate release tablet Take 5 mg by mouth every six (6) hours as needed for Pain. Associated Diagnoses: Diabetes mellitus with neurological manifestations, uncontrolled ([address] Essential hypertension; Mixed hyperlipidemia      HYDROcodone-acetaminophen (NORCO) 5-325 mg per tablet Take  by mouth. Associated Diagnoses: Diabetes mellitus with neurological manifestations, uncontrolled ([address] Essential hypertension; Diabetic ulcer of both feet associated with diabetes mellitus due to underlying condition [facility])       ! ! - Potential duplicate medications found. Please discuss with provider. Activity: Bedrest  Diet: Enteral feeding  Wound Care: As directed    No orders of the defined types were placed in this encounter.   45 minutes discharge time    Signed By: Manuela Akins MD     March 14, 2022

## 2022-03-14 NOTE — PROGRESS NOTES
Chart reviewed. At this time the patient's family has requested transfer to [facility]. The attending has been notified of this. SHIRLEY spoke with the patient's daughter, Elaina Leone, at [phone] and she stated she is waiting to hear back regarding transfer. Depending on that we will then discuss discharge planning.

## 2022-03-14 NOTE — WOUND CARE
IP WOUND CONSULT    Manjula Breath  MEDICAL RECORD NUMBER:  [id number]  AGE: 78 y.o. GENDER: female  : 1945  TODAY'S DATE:  3/14/2022    GENERAL     [x] Follow-up   [] New Consult    Tarkevin Breath is a 78 y.o. female referred by:   [x] Physician  [] Nursing  [] Other:         PAST MEDICAL HISTORY    Past Medical History:   Diagnosis Date    Diabetes mellitus [facility] [address]     HTN (hypertension)     Hyperlipidemia     Neuropathy     PAD (peripheral artery disease) ([facility] [address]     PCI    Sciatica         PAST SURGICAL HISTORY    Past Surgical History:   Procedure Laterality Date    HX AMPUTATION Right     great toe    HX CERVICAL FUSION      HX OTHER SURGICAL Bilateral     Stent placement    HX OTHER SURGICAL      HX VASCULAR STENT Bilateral     2 in right 1 in left    HX VASCULAR STENT Bilateral        FAMILY HISTORY    Family History   Problem Relation Age of Onset    Cancer Mother     Diabetes Mother     Hypertension Mother          ALLERGIES    No Known Allergies    MEDICATIONS    No current facility-administered medications on file prior to encounter.      Current Outpatient Medications on File Prior to Encounter   Medication Sig Dispense Refill    atorvastatin (LIPITOR) 20 mg tablet TAKE 1 TABLET BY MOUTH EVERY NIGHT 30 Tablet 5    gabapentin (NEURONTIN) 300 mg capsule TAKE 2 CAPSULES BY MOUTH THREE TIMES DAILY WITH MEALS 180 Capsule 2    Blood-Glucose Meter (OneTouch Verio Flex meter) misc Test blood glucose 3 time daily Dx Code: E11.65 1 Each 0    glucose blood VI test strips (OneTouch Verio test strips) strip Test blood glucose 3 time daily Dx Code: E11.65 300 Strip 3    lancets (OneTouch Delica Plus Lancet) 33 gauge misc Test blood glucose 3 time daily Dx Code: E11.65 300 Lancet 3    BD [place] 2nd Gen Pen Needle 32 gauge x \" ndle USE TO INJECT LANTUS AND VICTOZA DAILY 200 Pen Needle 4    insulin glargine (Lantus Solostar U-100 Insulin) 100 unit/mL (3 mL) inpn 22 Units by SubCUTAneous route daily. 30 mL 2    liraglutide (Victoza 3-Bandar) 0.6 mg/0.1 mL (18 mg/3 mL) pnij ADMINISTER 1.8 MG UNDER THE SKIN DAILY. 27 mL 3    dilTIAZem ER (CARDIZEM CD) 180 mg capsule TAKE 1 CAPSULE BY MOUTH DAILY 90 Cap 0    brimonidine (ALPHAGAN) 0.2 % ophthalmic solution Administer 1 Drop to both eyes three (3) times daily.  aspirin delayed-release 81 mg tablet Take  by mouth daily.  metFORMIN (GLUCOPHAGE) 1,000 mg tablet TAKE 1/2 TABLET BY MOUTH TWICE DAILY WITH MEALS 180 Tab 1    losartan (COZAAR) 50 mg tablet TK 2 TS PO QD IN THE EVENING  1    VICTOZA 3-BANDAR 0.6 mg/0.1 mL (18 mg/3 mL) pnij ADMINISTER 1.8 MG UNDER THE SKIN EVERY MORNING 9 mL 2    clopidogrel (PLAVIX) 75 mg tab Take 75 mg by mouth daily.  CHELY PEN NEEDLE 32 gauge x 5/32\" ndle USE TO INJECT LANTUS AND VICTOZA EVERY  Pen Needle 11    ferrous sulfate 325 mg (65 mg iron) tablet Take 1 Tab by mouth two (2) times a day. 60 Tab 5    polyethylene glycol (MIRALAX) 17 gram packet Take 1 Packet by mouth daily. 30 Packet 1    oxyCODONE IR (ROXICODONE) 5 mg immediate release tablet Take 5 mg by mouth every six (6) hours as needed for Pain.  HYDROcodone-acetaminophen (NORCO) 5-325 mg per tablet Take  by mouth. [unfilled]  Visit Vitals  BP (!) 187/70 (BP 1 Location: Left lower arm, BP Patient Position: At rest)   Pulse 74   Temp 97.9 °F (36.6 °C)   Resp 18   Ht 5' 7\" (1.702 m)   Wt 82.2 kg (181 lb 3.5 oz)   SpO2 99%   BMI 28.38 kg/m²       ASSESSMENT     Wound Identification & Type: Stage 2 PI to coccyx and stage 2 PI to left buttock; healed stage 2 PI to right buttock  Dressing change: Yes, see flow chart  Verbal consent for picture: Yes per Maryse, daughter    Contributing Factors: anticoagulation therapy, diabetes, poor glucose control, chronic pressure, decreased mobility, shear force, incontinence of stool, incontinence of urine and arterial insufficiency    Wound Toe (Comment  which one) Anterior; Left Toe nail is greenish yellow with serosanguineous fluid 02/22/22 (Active)   Wound Image    03/09/22 1624   Wound Etiology Other (Comment) 03/09/22 1624   Dressing Status New dressing applied 03/14/22 0345   Cleansed Cleansed with saline 03/14/22 0345   Dressing/Treatment Foam 03/14/22 0345   Wound Assessment Dry 03/14/22 0345   Drainage Amount None 03/14/22 0345   Wound Odor None 03/14/22 0345   Licha-Wound/Incision Assessment Dry/flaky 03/09/22 1624   Edges Attached edges 03/14/22 0345   Number of days: 20       Wound Toe (Comment  which one) Anterior;Right Greater toe amputated 02/22/22 (Active)   Wound Etiology Venous 03/14/22 0345   Dressing Status New dressing applied 03/14/22 0345   Cleansed Cleansed with saline 03/14/22 0345   Dressing/Treatment Foam 03/14/22 0345   Drainage Amount None 03/14/22 0345   Wound Odor None 03/14/22 0345   Edges Attached edges 03/14/22 0345   Number of days: 20       Wound Buttocks 02/23/22 (Active)   Wound Image   03/14/22 1646   Wound Etiology Pressure Stage 2 03/14/22 1646   Dressing Status New dressing applied 03/14/22 1646   Cleansed Cleansed with saline 03/14/22 1646   Dressing/Treatment Foam;Honey gel/honey paste 03/14/22 1646   Dressing Change Due 03/15/22 03/14/22 1646   Wound Length (cm) 2.2 cm 03/14/22 1646   Wound Width (cm) 2.4 cm 03/14/22 1646   Wound Depth (cm) 0.1 cm 03/14/22 1646   Wound Surface Area (cm^2) 5.28 cm^2 03/14/22 1646   Wound Volume (cm^3) 0.528 cm^3 03/14/22 1646   Wound Assessment Denuded;Pink/red 03/14/22 1646   Drainage Amount None 03/14/22 1646   Drainage Description Thin 03/14/22 0345   Wound Odor None 03/14/22 1646   Licha-Wound/Incision Assessment Blanchable erythema;Fragile 03/14/22 1646   Edges Defined edges 03/14/22 1646   Wound Thickness Description Partial thickness 03/14/22 1646   Number of days: 19       Wound Heel Left dark area (Active)   Wound Image   03/09/22 1638   Wound Etiology Other (Comment) 03/13/22 1632   Dressing Status Dry 03/13/22 1632   Cleansed Other (Comment) 03/13/22 1632   Dressing/Treatment Pressure dressing 03/13/22 1632   Offloading for Diabetic Foot Ulcers Offloading boot 03/13/22 1632   Wound Assessment Dry 03/13/22 1632   Drainage Amount None 03/13/22 1632   Drainage Description Other (Comment) 03/13/22 1632   Wound Odor None 03/13/22 1632   Licha-Wound/Incision Assessment Dry/flaky 03/13/22 1632   Number of days: 17       Wound Toe (Comment  which one) Anterior; Left black left great toe 03/03/22 (Active)   Number of days: 11       Wound Coccyx Partial Thickness 03/09/22 (Active)   Wound Image   03/14/22 1644   Wound Etiology Pressure Stage 2 03/14/22 1644   Dressing Status New dressing applied 03/14/22 1644   Cleansed Cleansed with saline 03/14/22 1644   Dressing/Treatment Foam;Honey gel/honey paste 03/14/22 1644   Dressing Change Due 03/15/22 03/14/22 1644   Wound Length (cm) 3.8 cm 03/14/22 1644   Wound Width (cm) 1.9 cm 03/14/22 1644   Wound Depth (cm) 0.2 cm 03/14/22 1644   Wound Surface Area (cm^2) 7.22 cm^2 03/14/22 1644   Change in Wound Size % 18.14 03/14/22 1644   Wound Volume (cm^3) 1.444 cm^3 03/14/22 1644   Wound Healing % 18 03/14/22 1644   Wound Assessment Pink/red 03/14/22 1644   Drainage Amount None 03/14/22 1644   Drainage Description Thin 03/14/22 0345   Wound Odor None 03/14/22 1644   Licha-Wound/Incision Assessment Fragile 03/14/22 1644   Edges Defined edges 03/14/22 1644   Wound Thickness Description Partial thickness 03/14/22 1644   Number of days: 5       Wound Leg upper Proximal;Posterior; Left Excoriated skin under the left posterior buttock 03/13/22 (Active)   Wound Etiology Skin Tear 03/13/22 1847   Dressing Status Other (Comment) 03/14/22 0433   Cleansed Other (Comment) 03/14/22 0433   Dressing/Treatment Foam 03/14/22 0433   Offloading for Diabetic Foot Ulcers Offloading boot 03/13/22 1847   Dressing Change Due 03/14/22 03/14/22 0433   Drainage Amount None 03/14/22 0433   Wound Odor None 03/14/22 0432 Edges Attached edges 03/13/22 1847   Wound Thickness Description Partial thickness 03/14/22 0433   Number of days: 1          PLAN     Skin Care & Pressure Relief Recommendations  Speciality bed P500 Low Air Loss  Minimize layers of linen  Turn/reposition approximately every 2 hours  Pillow wedges  Manage incontinence   Promote continence; Skin Protective lotion/cream to buttocks and sacrum daily and as needed with incontinence care  Offloading boots    Shakir 11  Blood Glucose: 235 on 3/14/22                             Albumin: 1.9 on 3/14/22  WBCs: 8.4 on 3/14/22     Support Surface: P500 Low Air Loss    Physician/Provider notified:   Recommendations: Family requested visit from [facility]. I re-evaluated wounds with Maryse, daughter, present. Slight deterioration noted to coccyx but not significant. Stage 2 PI adjacent to left buttock. Apply Therahoney and cover with Optifoam Border Sacral foam dressing daily, see dressing order. Stage 2 PI to right buttock is now healed. Ensure patient is repositioned on sides at greater than 30 degree angle using foam wedge to adequately offload sacrum/coccyx and buttocks. Patient was repositioned on right side using wedge with HOB at 30 degree angle and I was able to slip my hand beneath left buttock and sacrum to demonstrate adequate offloading of area. Discussed with Radha Malin. Patient noted to have loose stools. Incontinent care provided and documented in flow chart. Incontinence associated skin breakdown noted to gluteal folds r/t frequent loose stools. Recommend Immodium scheduled TID and not prn as last dose was given 24 hours ago. Maintain heel boots at all times daily while in the bed for offloading of the heels and to prevent foot drop.  Indwelling giraldo catheter in place to manage  incontinence.  Maintain HOB at 30 degrees or less, if not contraindicated (when not receiving tube feedings), to reduce pressure to buttocks and sacrum.  Raise foot of bed to help prevent friction and shear injury from sliding down in the bed.  Daughter Dusty Escamilla present and I spoke to Liberty Zimmerman, daughter, on the phone. All questions pertaining to wounds to buttocks and coccyx answered. Will continue to follow.               Discharge Wound Care Needs: TBD     Teaching completed with:   [] Patient           [] Family member       [] Caregiver          [] Nursing  [] Other    Patient/Caregiver Teaching:  Level of patient/caregiver understanding able to:   [] Indicates understanding       [] Needs reinforcement  [] Unsuccessful      [] Verbal Understanding  [] Demonstrated understanding       [] No evidence of learning  [] Refused teaching         [] N/A       Electronically signed by Beltran Venegas RN on 3/14/2022 at 4:49 PM

## 2022-03-14 NOTE — PROGRESS NOTES
I spoke with Dr. Jonathan Cintron regarding the family's wish to transfer the patient to [facility]. He stated he would contact the transfer center and see if a receiving physician is available. I updated the family at bedside and the patient's daughter Jia Duque by phone of the above information and verified that Jia Duque had spoken with Marky Weir the wound healing nurse during her assessment. Jia Duque stated she had no questions at this time.

## 2022-03-14 NOTE — ADT AUTH CERT NOTES
Stroke: Ischemic - Care Day 21 (3/13/2022) by Anil Borjas RN       Review Status Review Entered   Completed 3/13/2022 13:03      Criteria Review      Care Day: 21 Care Date: 3/13/2022 Level of Care: Telemetry    Guideline Day 3    Level Of Care    (X) Stroke unit or floor to discharge    3/13/2022 13:00:39 EDT by Jackeline Wang      remote tele bed    Clinical Status    (X) * Hemodynamic stability    3/13/2022 13:00:57 EDT by Jackeline Wang      98.2 72 18 100 164/75 ra    ( ) * Dangerous arrhythmia absent    ( ) * Mental status at baseline or stable and appropriate for next level of care    ( ) * Neurologic deficits absent or stable and appropriate for next level of care    ( ) * Discharge plans and education understood    Activity    ( ) * Ambulatory or acceptable for next level of care    Routes    ( ) * Oral hydration    (X) * Oral medications or regimen acceptable for next level of care    3/13/2022 13:02:15 EDT by Jackeline Wang      tyl 650 mg po q4 prn x1 norvasc 5 mg po bid, asa 81 mg po qd, iron 325 mg po bid, neurontin 300 mg po bid, humalog ssi qid, toprol 50 mg po bid,    ( ) * Oral diet or acceptable for next level of care    (X) Advance diet as tolerated    3/13/2022 13:02:15 EDT by Jackeline Wang      adult    Interventions    ( ) * Supplemental oxygen absent or at baseline requirement    Medications    ( ) * Antithrombotic therapy appropriate for next level of care established    3/13/2022 13:03:02 EDT by Jackeline Wang    Subject: Additional Clinical Information    rbc 2.64 h/h 7.5/24.3 inr 1.3 na 148 chl 116 anion gap 4 glu 262 bun 33 crit 1.25 bun ratio 26 a 8.4 gfr 42 alb 1.9 glob 4.6 ag raito 0.4       * Milestone   Additional Notes   3*/13/22      General Daily Progress Note                   Patient Name:    John Anthony        YOB: 1945        Age:   78 y.o.        Admit Date: 2/21/2022           Subjective:               Resting the bed awake not in distress Physical Exam:         Constitutional: pt is oriented to person, place, and time. HENT:    Head: Normocephalic and atraumatic. Eyes: Pupils are equal, round, and reactive to light. EOM are normal.    Cardiovascular: Normal rate, regular rhythm and normal heart sounds. Pulmonary/Chest: Breath sounds normal. No wheezes. No rales. Exhibits no tenderness. Abdominal: Soft. Bowel sounds are normal. There is no abdominal tenderness. There is no rebound and no guarding. Musculoskeletal: Normal range of motion. Neurological: pt is alert and oriented to person, place, and time. Physical Exam:         Constitutional: pt is oriented to person, place, and time. HENT:    Head: Normocephalic and atraumatic. Eyes: Pupils are equal, round, and reactive to light. EOM are normal.    Cardiovascular: Normal rate, regular rhythm and normal heart sounds. Pulmonary/Chest: Breath sounds normal. No wheezes. No rales. Exhibits no tenderness. Abdominal: Soft. Bowel sounds are normal. There is no abdominal tenderness. There is no rebound and no guarding. Musculoskeletal: Normal range of motion. Neurological: pt is alert and oriented to person, place, and time. Assessment:       Non-STEMI   PVD   Acute CVA   UTI   Anemia   RICARDO   Type 2 diabetes   Protein calorie malnutrition           Plan:       Continue IV Zosyn   Amlodipine 5 mg daily aspirin 81 mg daily ferrous sulfate 3-500 twice a day Neurontin 300 mg twice a day Lantus 50 units subcu at bedtime metoprolol 50 mg twice a day           Stroke: Ischemic - Care Day 18 (3/10/2022) by Sharad Garay       Review Status Review Entered   Completed 3/10/2022 13:41      Criteria Review      Care Day: 18 Care Date: 3/10/2022 Level of Care: ICU    Guideline Day 2    Clinical Status    (X) * Hemodynamic stability    3/10/2022 13:41:14 EST by Sharad Garay      (!) 129/47 (BP 1 Location: Left lower arm, BP Patient Position:  At rest;Supine)  Pulse81  Temp99.1 °F (37.3 °C)  Resp19  Ht5' 7\" (1.702 m)  Wt181 lb 3.5 oz (82.2 kg)  TnS925%    (X) * Mental status at baseline or stable    3/10/2022 13:41:14 EST by Jessica COLE mental status    (X) * Neurologic deficits absent or stable    3/10/2022 13:41:14 EST by Jessica COLE neuro status    (X) * Unimpaired swallowing    3/10/2022 13:41:14 EST by Jessica Byrd      started on tube feeds/ this will be new baseline. speech following    Activity    ( ) * Up to chair    Routes    ( ) * Oral hydration    ( ) * Oral medications    ( ) * Advance diet as tolerated    * Milestone   Additional Notes   3/10/22       IM note   78years old with severe with right hemiparesis recurrent, acute renal failure resolved chronic kidney failure, peripheral vascular disease, E. coli UTI, diabetes with diarrhea onset with enteral feeds. Plan:                 Track urine output   Track serum sodium   Agree with water flushes   Requested for dietitian to speak to daughter. Explained to her that some amount of  is loose bowel movements expected with tube feeds. Also explained to her the dietitian has added fiber and probiotics but this did not reassure her          Trauma surgeon note    Lisa Costa is a 78 y.o. very pleasant woman currently hospitalized with a CVA and seizure activities. Kalia Medina was consulted to evaluate patient's vascular status because of the left great toe with gangrene.  Patient examined this morning.  Patient is awake however not able to provide any history.  Patient's daughter at bedside. Alfred Matamoros was providing all the history.  Patient apparently had a previous multiple interventions done and the leg stent placement done.  And daughter says no interventions and no surgery per patient's request prior decision.       (!) 129/47 (BP 1 Location: Left lower arm, BP Patient Position: At rest;Supine)    Pulse 81   Temp 99.1 °F (37.3 °C)   Resp 19   Ht 5' 7\" (1.702 m)   Wt 181 lb 3.5 oz (82.2 kg)   SpO2 96%   BMI 28.38 kg/m²      Active Problems:     CVA (cerebral vascular accident) ([address] (2/21/2022)         Elevated troponin (2/21/2022)               Plan:       Patient's family do not want any major vascular work-up to be done on her.  Had ERLINDA examination done is shows right and left ABIs is 0.53   Recommend apply iodine swabs on the gangrenous toe area daily. Leo Guzmanling family do not want any major interventions to be done due to other comorbid conditions with stroke.       aspartame (METAMUCIL FIBER) packet 1 Packet 1 Packet Per G Tube BID    o artificial saliva (MOUTH KOTE) 1 Spray 1 Spray Oral PRN   o [START ON 3/10/2022] acidophilus-pectin, citrus tablet 2 Tablet 2 Tablet Oral DAILY   o piperacillin-tazobactam (ZOSYN) 3.375 g in 0.9% sodium chloride (MBP/ADV) 100 mL MBP 3.375 g IntraVENous Q8H   o aspirin chewable tablet 81 mg 81 mg Oral DAILY   o benzocaine-zinc cl-benzalkonium cl (ORAJEL) 20-0.1-0.02 % mucosal gel Oral BID   o amLODIPine (NORVASC) tablet 5 mg 5 mg Oral BID   o metoprolol succinate (TOPROL-XL) XL tablet 50 mg 50 mg Oral BID   o insulin glargine (LANTUS) injection 25 Units 25 Units SubCUTAneous QHS   o acetaminophen (TYLENOL) solution 650 mg 650 mg Per G Tube Q4H PRN   o gabapentin (NEURONTIN) capsule 300 mg 300 mg Oral BID   o 0.45% sodium chloride infusion 25 mL/hr IntraVENous CONTINUOUS   o 0.9% sodium chloride infusion 250 mL 250 mL IntraVENous PRN   o insulin lispro (HUMALOG) injection SubCUTAneous Q4H   o latanoprost (XALATAN) 0.005 % ophthalmic solution 1 Drop 1 Drop Right Eye QPM   o 0.9% sodium chloride infusion 250 mL 250 mL IntraVENous PRN   o zinc oxide-white petrolatum 20-75 % topical paste Topical BID   o lidocaine (XYLOCAINE) 4 % (40 mg/mL) topical solution Topical PRN   o [Held by provider] atorvastatin (LIPITOR) tablet 80 mg 80 mg Oral DAILY   o labetaloL (NORMODYNE;TRANDATE) 20 mg/4 mL (5 mg/mL) injection 10 mg 10 mg IntraVENous Q6H PRN   o sodium chloride (NS) flush 5-40 mL 5-40 mL IntraVENous PRN   o brimonidine (ALPHAGAN) 0.2 % ophthalmic solution 1 Drop 1 Drop Both Eyes TID   o ferrous sulfate tablet 325 mg 325 mg Oral BID   o glucose chewable tablet 16 g 4 Tablet Oral PRN   o glucagon (GLUCAGEN) injection 1 mg 1 mg IntraMUSCular PRN   o dextrose 10% infusion 125-250 mL 125-250 mL IntraVENous PRN

## 2022-03-14 NOTE — PROGRESS NOTES
Received pt resting in bed, no distress noted, pt's daughters Margarita Ferrern and Xander Cramer at bedside.

## 2022-03-14 NOTE — PROGRESS NOTES
Spoke with patient's daughters who were at bedside about their concerns. Family is concerned that patient has developed a new pressure ulcer during this hospitalization. They would like the wound healing nurse to see the patient and they would like the patient transferred to [facility] as a comprehensive stroke center.

## 2022-03-15 NOTE — PROGRESS NOTES
Per Pooja Snow at the transfer center the patient has been accepted at [facility] [address] [facility] [phone]. Transportation set up via [employer] at [phone] with an ETA for pickup of 60 minutes. Primary RN informed.

## 2022-03-15 NOTE — PROGRESS NOTES
..Discharge plan of care/case management plan validated with provider discharge order. Pt. Being discharged to [facility]. Report was given to [employer] using SBAR. Family was at bedside when pt. Was discharged.

## 2022-03-15 NOTE — PROGRESS NOTES
Maryse, pt daughter just left, pt turned to the right, clean and dry meds also given. Pt tolerated all well.

## 2022-03-15 NOTE — PROGRESS NOTES
Comprehensive Nutrition Assessment    Type and Reason for Visit: Reassess (Goal)    Nutrition Recommendations/Plan:   Continue TF and flushes as ordered. Add Prosource 1 pk/d (60 kcal, 15 gm PRO)    TF+ Prosource provides: 1860 kcal(87%), 114 gm(99%), 2112 mL H2O((106%). Monitor and record TF tolerance, weights, BMs, skin in I/Os. Nutrition Assessment:   Admitted for CVA w/ R. Hemiparesis. Remains lethargic and unable to really follow commands. SLP eval 3/1- rec'd continue NPO, feedings via NGT. Pt w/ RICARDO- noted creat improving, pt w/ multisystem organ failure. (3/2)PEG placed. PEG feeds at goal rate. Reported Diarrhea x 7 3/7, improved 3/8 per Nsg- likely unrelated to feeding and may be more from ABT. ENNs, TF and flushes adjusted. (3/15) Pt in room w/ family and TF held 2/2 Nsg care for loose BM this a.m. TF restarted, running at goal rate. Loose BM x2 days since Imodium held; continues on metamucil. Continue regimen and add PRO for PIs; possible transfer to [facility] today. Labs: Alb 2.0, Cl 113, , BUN 30, Cr 1.10, GFR 59, Ca 8.4, H/H 7.7/25.0, POC -262 mg/dL. Meds: Metamucil, lopressor, humalog, lantus, FerrouSul, probiotic, norvasc, aspirin. Malnutrition Assessment:  Malnutrition Status:  No malnutrition    Context:  Chronic illness       Estimated Daily Nutrient Needs:  Energy (kcal): 2135 kcal (35 kcal/kg); Weight Used for Energy Requirements: Ideal  Protein (g): 115 gm/d (1.4 gm/kg); Weight Used for Protein Requirements: Current  Fluid (ml/day): 2100 mL/d; Method Used for Fluid Requirements: 1 ml/kcal    Nutrition Related Findings:   Appears well nourished. No N/V/C, loose BM- 3/15. No edema per Nsg. Continues on SLP tx- NPO, PEG feeds. Wounds:    Pressure injury,Stage II,Multiple (St II PI- L.  Buttock, Coccyx.)       Current Nutrition Therapies:  DIET NPO  ADULT TUBE FEEDING PEG; Diabetic; Delivery Method: Continuous; Continuous Initial Rate (mL/hr): 50; Continuous Advance Tube Feeding: No; Water Flush Volume (mL): 200; Water Flush Frequency: Q 4 hours    Anthropometric Measures:  · Height:  5' 7\" (170.2 cm)  · Current Body Wt:  82.2 kg (181 lb 3.5 oz)   · Admission Body Wt:       · Usual Body Wt:   (elkin)     · Ideal Body Wt:  135 lbs:  134.2 %   · Adjusted Body Weight:   ; Weight Adjustment for: No adjustment   · Adjusted BMI:       · BMI Category: Overweight (BMI 25.0-29. 9)       Nutrition Diagnosis:   · Inadequate protein-energy intake related to cognitive or neurological impairment as evidenced by nutrition support-enteral nutrition    Nutrition Interventions:   Food and/or Nutrient Delivery: Continue tube feeding,Start oral nutrition supplement  Nutrition Education and Counseling: No recommendations at this time  Coordination of Nutrition Care: Continue to monitor while inpatient,Speech therapy    Goals:  Meet >75% of ENNs. weighty maintenance, wound healing. Nutrition Monitoring and Evaluation:   Behavioral-Environmental Outcomes: None identified  Food/Nutrient Intake Outcomes: Enteral nutrition intake/tolerance  Physical Signs/Symptoms Outcomes: Biochemical data,Meal time behavior,Diarrhea,Weight,Skin    Discharge Planning:    Enteral nutrition     Electronically signed by Sepideh Kohli RD on 3/15/2022 at 12:10 PM    Contact: ext. Tiago  or [employer].

## 2022-03-15 NOTE — DISCHARGE SUMMARY
Discharge Summary     Patient: Edinson Mckay MRN: [id number]  SSN: [id number]    YOB: 1945  Age: 78 y.o.   Sex: female       Admit Date: 2/21/2022    Discharge Date: 3/15/2022      Admission Diagnoses: CVA (cerebral vascular accident) [facility]) [I63.9]  Elevated troponin [R77.8]    Discharge Diagnoses:   Problem List as of 3/15/2022 Date Reviewed: 3/2/2022          Codes Class Noted - Resolved    CVA (cerebral vascular accident) [facility]) ICD-10-CM: I63.9  ICD-9-CM: 434.91  2/21/2022 - Present        Elevated troponin ICD-10-CM: R77.8  ICD-9-CM: 790.6  2/21/2022 - Present        Ischemia of both lower extremities ICD-10-CM: I99.8  ICD-9-CM: 459.89  7/22/2020 - Present        Pain in both lower legs ICD-10-CM: M79.661, M79.662  ICD-9-CM: 729.5  7/22/2020 - Present        Acute osteomyelitis of ankle or foot ([facility] ICD-10-CM: M86.179  ICD-9-CM: 730.07  4/4/2019 - Present        Diabetic peripheral neuropathy ([facility] ICD-10-CM: E11.42  ICD-9-CM: 250.60, 357.2  4/4/2019 - Present        Spinal stenosis in cervical region ICD-10-CM: M48.02  ICD-9-CM: 723.0  4/4/2019 - Present        Type 2 diabetes mellitus with nephropathy ([facility] ICD-10-CM: E11.21  ICD-9-CM: 250.40, 583.81  1/31/2018 - Present        DM type 2 causing vascular disease ([facility] ICD-10-CM: E11.59  ICD-9-CM: 250.70, 443.81  7/27/2017 - Present        Hypercalcemia ICD-10-CM: T25.32  ICD-9-CM: 275.42  12/12/2016 - Present        PAD (peripheral artery disease) ([facility] ICD-10-CM: I73.9  ICD-9-CM: 443.9  4/11/2016 - Present        Infection of nailbed of toe of left foot ICD-10-CM: I83.650  ICD-9-CM: 681.11  9/10/2015 - Present        HTN (hypertension) ICD-10-CM: I10  ICD-9-CM: 401.9  Unknown - Present        Hyperlipidemia ICD-10-CM: E78.5  ICD-9-CM: 272.4  Unknown - Present        Neuropathy ICD-10-CM: G62.9  ICD-9-CM: 355.9  Unknown - Present        Sciatica ICD-10-CM: M54.30  ICD-9-CM: 724.3  Unknown - Present        Diabetes mellitus with neurological manifestations, uncontrolled ([facility] ICD-10-CM: E11.49, E11.65  ICD-9-CM: 250.62  8/8/2013 - Present               Discharge Condition: Good    Hospital Course: 78years old patient who was recently discharged from [facility] for acute CVA presented with a complaint of increased weakness on the right side and change in mental status patient has a past history of diabetes history of peripheral vascular disease history of high blood pressure chronic renal disease hyperlipidemia, sciatica,. Patient was seen by the neurologist and had MRI of the brain which showed acute infarction of the left paracentral lobulel medial region of the white matter of the left motor cortex patient was initially on Plavix and this was changed by the neurologist to Xarelto 2.5 mg p.o. twice a day and daily aspirin. Patient had an episode of hematuria and had continuous bladder irrigation seen by the urologist anticoagulations were discontinued by the neurologist.Patient also showed elevated troponin I about 700 patient was seen by cardiologist medical treatment was advised. Patient was also seen by the podiatrist for gangrene of the foot was treated medically and conservatively. Patient was seen by PT and OT and patient is essentially bedbound. She was treated for UTI and seen by infectious disease physician. Patient family requested transfer to [facility].   He also declined placement in a skilled nursing facility and are considering discharged with home health if patient is not accepted at [facility] patient will require 24-hour care service and extensive family meeting was conducted with family with continuous update of the patient's situation patient was seen by nephrology for acute renal failure on chronic renal failure and had PEG tube placement by the GI physician she is on enteral feeds    Consults: Cardiology, Infectious Disease, Nephrology and Physical Medicine and Rehabilitation    Significant Diagnostic Studies: labs:   Recent Results (from the past 24 hour(s))   GLUCOSE, POC    Collection Time: 03/14/22  5:10 PM   Result Value Ref Range    Glucose (POC) 247 (H) 65 - 117 mg/dL    Performed by Tamia Spence, POC    Collection Time: 03/14/22  7:36 PM   Result Value Ref Range    Glucose (POC) 242 (H) 65 - 117 mg/dL    Performed by Trip Ortiz    GLUCOSE, POC    Collection Time: 03/15/22  7:15 AM   Result Value Ref Range    Glucose (POC) 262 (H) 65 - 117 mg/dL    Performed by Jacqui Butterfield    PROTHROMBIN TIME + INR    Collection Time: 03/15/22  7:27 AM   Result Value Ref Range    Prothrombin time 15.3 (H) 11.9 - 14.6 sec    INR 1.2 (H) 0.9 - 1.1     HEPATIC FUNCTION PANEL    Collection Time: 03/15/22  7:27 AM   Result Value Ref Range    Protein, total 7.2 6.4 - 8.2 g/dL    Albumin 2.0 (L) 3.5 - 5.0 g/dL    Globulin 5.2 (H) 2.0 - 4.0 g/dL    A-G Ratio 0.4 (L) 1.1 - 2.2      Bilirubin, total 0.3 0.2 - 1.0 mg/dL    Bilirubin, direct 0.2 0.0 - 0.2 mg/dL    Alk. phosphatase 77 45 - 117 U/L    AST (SGOT) 23 15 - 37 U/L    ALT (SGPT) 38 12 - 78 U/L   GLUCOSE, POC    Collection Time: 03/15/22 12:42 PM   Result Value Ref Range    Glucose (POC) 178 (H) 65 - 117 mg/dL    Performed by Londell Bloch GLYNIS          DUPLEX LOWER EXT ARTERY BILAT   Final Result      ANKLE BRACHIAL INDEX   Final Result      CT HEAD WO CONT   Final Result   1. Evolving left FREDO territory ischemic infarct. No acute hemorrhage. 2.  Paranasal sinus disease. XR FOOT LT AP/LAT   Final Result      DUPLEX UPPER EXT VENOUS RIGHT   Final Result      US ABD COMP   Final Result   Gallbladder sludge without other acute abnormality. XR CHEST PORT   Final Result   The cardiomediastinal silhouette is appropriate for age, technique,   and lung expansion. Pulmonary vasculature is not congested. The lungs are   essentially clear. No effusion or pneumothorax is seen.          CT HEAD WO CONT   Final Result   Negative for acute intracranial process. CT ABD PELV WO CONT   Final Result   Hemorrhage within the urinary bladder. Mild left   hydroureteronephrosis. XR SWALLOW FUNC VIDEO   Final Result   Penetration with thin consistency. No aspiration. CTA HEAD NECK W WO CONT   Final Result   Negative head and neck CTA. Please note that all carotid bifurcation stenoses are measured as per NASCET   criteria. CT CODE NEURO HEAD WO CONTRAST   Final Result   1. No acute large vessel intracranial ischemia, hemorrhage. Called report to   patient's nurse Deedee Murillo at 7:12 PM 2/22/2022.   2. Right basal ganglia lacunar infarct. 3. Periventricular microangiopathy. 4. Pansinusitis. See above. MRI BRAIN WO CONT   Final Result   Acute infarction left paracentral lobule and region of the white   matter of the left motor cortex. CT CODE NEURO HEAD WO CONTRAST   Final Result      1. No acute intracranial hemorrhage. 2. Left basal ganglia lacunar infarct, which is age indeterminate in the absence   of any prior outside studies, but favored to be chronic. 3. Moderate to severe paranasal sinus disease. Results called to Dr. Karel Horn at 9:39 PM on 2/21/2022          Disposition: As directed by family    Discharge Medications:   Current Discharge Medication List      CONTINUE these medications which have NOT CHANGED    Details   atorvastatin (LIPITOR) 20 mg tablet TAKE 1 TABLET BY MOUTH EVERY NIGHT  Qty: 30 Tablet, Refills: 5    Associated Diagnoses: Type II or unspecified type diabetes mellitus with neurological manifestations, uncontrolled(250.62) ([address] Essential hypertension; Hyperlipidemia, unspecified hyperlipidemia type; Vitamin D deficiency; Vitamin B12 deficiency; Neuropathy; Type 2 diabetes mellitus with hyperglycemia, with long-term current use of insulin ([address] Diabetes mellitus with neurological manifestations, uncontrolled ([address]  Mixed hyperlipidemia; PAD (peripheral artery disease) ([address] Hypercalcemia; Uncontrolled type 2 diabetes mellitus with hyperglycemia, with long-term current use of insulin (HCC)      gabapentin (NEURONTIN) 300 mg capsule TAKE 2 CAPSULES BY MOUTH THREE TIMES DAILY WITH MEALS  Qty: 180 Capsule, Refills: 2    Associated Diagnoses: Diabetes mellitus with neurological manifestations, uncontrolled ([facility] Essential hypertension; PAD (peripheral artery disease) ([facility] Mixed hyperlipidemia; Type 2 diabetes mellitus with hyperglycemia, with long-term current use of insulin ([facility] Hypercalcemia; Uncontrolled type 2 diabetes mellitus with hyperglycemia, with long-term current use of insulin ([facility] Type II or unspecified type diabetes mellitus with neurological manifestations, uncontrolled(250.62) ([facility] Hyperlipidemia, unspecified hyperlipidemia type; Vitamin D deficiency; Vitamin B12 deficiency; Neuropathy      Blood-Glucose Meter (OneTouch Verio Flex meter) misc Test blood glucose 3 time daily Dx Code: E11.65  Qty: 1 Each, Refills: 0    Comments: msg received that accu check and onetouch are preferred. If onetouch verio does not go through please change to onetouch ultra. Thanks! Associated Diagnoses: Type 2 diabetes mellitus with hyperglycemia, with long-term current use of insulin ([facility])      glucose blood VI test strips (OneTouch Verio test strips) strip Test blood glucose 3 time daily Dx Code: E11.65  Qty: 300 Strip, Refills: 3    Comments: msg received that accu check and onetouch are preferred. If onetouch verio does not go through please change to onetouch ultra. Thanks! Associated Diagnoses: Type 2 diabetes mellitus with hyperglycemia, with long-term current use of insulin (HCC)      lancets (OneTouch Delica Plus Lancet) 33 gauge misc Test blood glucose 3 time daily Dx Code: E11.65  Qty: 300 Lancet, Refills: 3    Comments: msg received that accu check and onetouch are preferred. If onetouch verio does not go through please change to onetouch ultra. Thanks!   Associated Diagnoses: Type 2 diabetes mellitus with hyperglycemia, with long-term current use of insulin (HCC)      !! BD [place] 2nd Gen Pen Needle 32 gauge x 5/32\" ndle USE TO INJECT LANTUS AND VICTOZA DAILY  Qty: 200 Pen Needle, Refills: 4    Associated Diagnoses: Uncontrolled type 2 diabetes mellitus with hyperglycemia, with long-term current use of insulin ([address] Essential hypertension; Type 2 diabetes mellitus with hyperglycemia, with long-term current use of insulin ([address] Diabetes mellitus with neurological manifestations, uncontrolled ([address] Mixed hyperlipidemia; PAD (peripheral artery disease) ([address] Hypercalcemia; Type II or unspecified type diabetes mellitus with neurological manifestations, uncontrolled(250.62) ([address] Hyperlipidemia, unspecified hyperlipidemia type; Vitamin D deficiency; Vitamin B12 deficiency; Neuropathy      insulin glargine (Lantus Solostar U-100 Insulin) 100 unit/mL (3 mL) inpn 22 Units by SubCUTAneous route daily. Qty: 30 mL, Refills: 2    Associated Diagnoses: Diabetes mellitus with neurological manifestations, uncontrolled ([address] Essential hypertension; PAD (peripheral artery disease) ([address] Mixed hyperlipidemia; Type 2 diabetes mellitus with hyperglycemia, with long-term current use of insulin ([address] Hypercalcemia; Uncontrolled type 2 diabetes mellitus with hyperglycemia, with long-term current use of insulin ([address] Type II or unspecified type diabetes mellitus with neurological manifestations, uncontrolled(250.62) ([address] Hyperlipidemia, unspecified hyperlipidemia type; Vitamin D deficiency; Vitamin B12 deficiency; Neuropathy      !! liraglutide (Victoza 3-Sheldon) 0.6 mg/0.1 mL (18 mg/3 mL) pnij ADMINISTER 1.8 MG UNDER THE SKIN DAILY. Qty: 27 mL, Refills: 3    Associated Diagnoses: Diabetes mellitus with neurological manifestations, uncontrolled ([address] Essential hypertension; PAD (peripheral artery disease) ([address] Mixed hyperlipidemia;  Type 2 diabetes mellitus with hyperglycemia, with long-term current use of insulin ([address] Hypercalcemia; Uncontrolled type 2 diabetes mellitus with hyperglycemia, with long-term current use of insulin ([address] Type II or unspecified type diabetes mellitus with neurological manifestations, uncontrolled(250.62) ([address] Hyperlipidemia, unspecified hyperlipidemia type; Vitamin D deficiency; Vitamin B12 deficiency; Neuropathy      dilTIAZem ER (CARDIZEM CD) 180 mg capsule TAKE 1 CAPSULE BY MOUTH DAILY  Qty: 90 Cap, Refills: 0      brimonidine (ALPHAGAN) 0.2 % ophthalmic solution Administer 1 Drop to both eyes three (3) times daily. aspirin delayed-release 81 mg tablet Take  by mouth daily. metFORMIN (GLUCOPHAGE) 1,000 mg tablet TAKE 1/2 TABLET BY MOUTH TWICE DAILY WITH MEALS  Qty: 180 Tab, Refills: 1    Associated Diagnoses: Type 2 diabetes mellitus with hyperglycemia, with long-term current use of insulin ([address] Essential hypertension; Diabetes mellitus with neurological manifestations, uncontrolled ([address] Mixed hyperlipidemia; PAD (peripheral artery disease) ([address] Hypercalcemia; Uncontrolled type 2 diabetes mellitus with hyperglycemia, with long-term current use of insulin ([address] Type II or unspecified type diabetes mellitus with neurological manifestations, uncontrolled(250.62) ([address] Hyperlipidemia, unspecified hyperlipidemia type; Vitamin D deficiency; Vitamin B12 deficiency; Neuropathy      losartan (COZAAR) 50 mg tablet TK 2 TS PO QD IN THE EVENING  Refills: 1      !! VICTOZA 3-BANDAR 0.6 mg/0.1 mL (18 mg/3 mL) pnij ADMINISTER 1.8 MG UNDER THE SKIN EVERY MORNING  Qty: 9 mL, Refills: 2    Associated Diagnoses: Essential hypertension; Type 2 diabetes mellitus with hyperglycemia, with long-term current use of insulin ([address] Diabetes mellitus with neurological manifestations, uncontrolled ([address] Mixed hyperlipidemia; PAD (peripheral artery disease) ([address] Hypercalcemia;  Uncontrolled type 2 diabetes mellitus with hyperglycemia, with long-term current use of insulin ([facility] Type II or unspecified type diabetes mellitus with neurological manifestations, uncontrolled(250.62) ([address] Hyperlipidemia, unspecified hyperlipidemia type; Vitamin D deficiency; Vitamin B12 deficiency; Neuropathy      clopidogrel (PLAVIX) 75 mg tab Take 75 mg by mouth daily. !! CHELY PEN NEEDLE 32 gauge x 5/32\" ndle USE TO INJECT LANTUS AND VICTOZA EVERY DAY  Qty: 200 Pen Needle, Refills: 11    Associated Diagnoses: Uncontrolled type 2 diabetes mellitus with hyperglycemia, with long-term current use of insulin (HCC)      ferrous sulfate 325 mg (65 mg iron) tablet Take 1 Tab by mouth two (2) times a day. Qty: 60 Tab, Refills: 5    Associated Diagnoses: Diabetes mellitus with neurological manifestations, uncontrolled ([address] Essential hypertension; PAD (peripheral artery disease) ([address] Mixed hyperlipidemia; Type 2 diabetes mellitus with hyperglycemia, with long-term current use of insulin ([facility] Hypercalcemia; Uncontrolled type 2 diabetes mellitus with hyperglycemia, with long-term current use of insulin ([facility] Type II or unspecified type diabetes mellitus with neurological manifestations, uncontrolled(250.62) ([address] Hyperlipidemia, unspecified hyperlipidemia type; Vitamin D deficiency; Vitamin B12 deficiency; Neuropathy      polyethylene glycol (MIRALAX) 17 gram packet Take 1 Packet by mouth daily. Qty: 30 Packet, Refills: 1    Associated Diagnoses: Type II diabetes mellitus, uncontrolled ([address] Essential hypertension with goal blood pressure less than 140/90; Hyperlipidemia, unspecified hyperlipidemia type; Neuropathy      oxyCODONE IR (ROXICODONE) 5 mg immediate release tablet Take 5 mg by mouth every six (6) hours as needed for Pain. Associated Diagnoses: Diabetes mellitus with neurological manifestations, uncontrolled ([address] Essential hypertension; Mixed hyperlipidemia      HYDROcodone-acetaminophen (NORCO) 5-325 mg per tablet Take  by mouth.     Associated Diagnoses: Diabetes mellitus with neurological manifestations, uncontrolled ([facility] Essential hypertension; Diabetic ulcer of both feet associated with diabetes mellitus due to underlying condition [facility])       ! ! - Potential duplicate medications found. Please discuss with provider. Activity: Bedrest  Diet: Enteral feeding  Wound Care: As directed    Follow-up Appointments   Procedures    FOLLOW UP VISIT Appointment in: 3 - 5 Days     Standing Status:   Standing     Number of Occurrences:   1     Order Specific Question:   Appointment in     Answer:   3 - 5 Days   45 minutes discharge time  Waiting for bed at [facility]. Informed daughter.  Discussed with Dr John Yates    Signed By: Ede Romo MD     March 15, 2022

## 2022-03-15 NOTE — PROGRESS NOTES
Pt turned to left, pt daughter [employer] and her female friend at bedside, other two daughters left. Pt resting V/S WNL. Pt clean and dry no distress note as she nodes her head and says yes to being comfortable.

## 2022-03-15 NOTE — PROGRESS NOTES
Problem: Mobility Impaired (Adult and Pediatric)  Goal: *Acute Goals and Plan of Care (Insert Text)  Description: Revised 3/15/22  Pt will participate in 3-5 LE exercises in 7 days. Pt will perform bed mobility with mod Ax1 in 7 days. Pt will sit EOB x 10 min with min A in 7 days to prepare for transfers. Outcome: Not Progressing Towards Goal  PHYSICAL THERAPY REASSESSMENT  Patient: Pravin Adams (18 y.o. female)  Date: 3/15/2022  Diagnosis: CVA (cerebral vascular accident) ([facility] [I63.9]  Elevated troponin [R77.8] <principal problem not specified>  Procedure(s) (LRB):  PERCUTANEOUS ENDOSCOPIC GASTROSTOMY TUBE INSERTION (N/A)  ESOPHAGOGASTRODUODENOSCOPY (EGD) (N/A) 13 Days Post-Op  Precautions:    Chart, physical therapy assessment, plan of care and goals were reviewed. ASSESSMENT  Pt is a 79 yo female admitted on 2/21/2022 for slurred speech and recent change in status per medical records; pt currently being treated for CVA, NSTEMI, renal failure, and elevated troponin. Brain MRI showed acute infarction left paracentral lobule and region of the white matter of the left motor cortex. PMH: DM, HTN, HLD, neuropathy, PAD, sciatica, cervical fusion, right hallux amputation, and h/o stent placements. Per PT evaluation pt resides with daughter in a 1 story home with ramp to enter, pt required assistance for ADLS/IADLS, WC with primary mobility prior to admission but was able to use RW for short distance amb/transfers with daughter assistance. DME: WC, RW, BSC, rollator. Patient initially seen for PT evaluation 2/23 and no skilled PT sessions since evalution. Pt seen for 5 visits since last reassessment on 3/4/22. Patient seen today for reassessment for LOS. Patient A&O x0. Pt semi-supine in bed upon PT/OT arrival, agreeable to session. Pt making minimal to no progress since last reassessment due to severity of deficits. Pt requiring max A to roll R SL, and total A to roll L SL.   Pt with eyes intermediate open during most of treatment, but pt was making eye contact with therapists and family members present in room. Pt's bed linens were changed while rolling and pt was cleaned up from stool along with dara pad being changed. Pt assisted with PROM of LE's as she demos no active movement of either extremity during treatment today. Slight twitch of quad muscle noted on LLE. Pt did report pain in her RLE with movement but unable to state where it hurt. Pt thanked OT and I for working with her at the end of treatment. Pt is being decreased to 1x/wk for PT due to minimal progress being made at this point. Pt would benefit from continued skilled PT services to improve LE ROM, strength, balance and overall functional mobility to ease the burden on her caregivers. Recommend d/c to SNF. Other factors to consider for discharge: impaired mobility, level of assist         PLAN :  Patient continues to benefit from skilled intervention to address the above impairments. Continue treatment per established plan of care. to address goals. Recommendation for discharge: (in order for the patient to meet his/her long term goals)  Elie Patel    This discharge recommendation:  Has been made in collaboration with the attending provider and/or case management    IF patient discharges home will need the following DME: to be determined (TBD)       SUBJECTIVE:   Patient stated Galina Lied you for helping me.     OBJECTIVE DATA SUMMARY:   Critical Behavior:  Neurologic State: Drowsy (Simultaneous filing. User may not have seen previous data.)  Orientation Level: Unable to verbalize (Simultaneous filing. User may not have seen previous data.)  Cognition: Decreased command following,Decreased attention/concentration  Safety/Judgement: Decreased insight into deficits  Functional Mobility Training:  Bed Mobility:  Rolling: Maximum assistance; Total assistance        Scooting:  Total assistance;Assist x2 Transfers: Therapeutic Exercises:   PROM to B LE's in semi-supine  Pain Rating:  Pt unable to verbalize but stated pain with movement of R LE    Activity Tolerance:   Fair and requires rest breaks  Please refer to the flowsheet for vital signs taken during this treatment. After treatment patient left in no apparent distress:   Call bell within reach, Caregiver / family present, and L SL    COMMUNICATION/COLLABORATION:   The patients plan of care was discussed with: Occupational therapist.  PT/OT sessions occurred together for increased safety of pt and clinician.       Mira Dominguez   Time Calculation: 27 mins

## 2022-03-15 NOTE — PROGRESS NOTES
OCCUPATIONAL THERAPY RE-ASSESSMENT  Patient: Sherrell Oshea (11 y.o. female)  Date: 3/15/2022  Primary Diagnosis: CVA (cerebral vascular accident) ([address] [I63.9]  Elevated troponin [R77.8]  Procedure(s) (LRB):  PERCUTANEOUS ENDOSCOPIC GASTROSTOMY TUBE INSERTION (N/A)  ESOPHAGOGASTRODUODENOSCOPY (EGD) (N/A) 13 Days Post-Op   Precautions: fall risk       ASSESSMENT  Pt is a 79 y/o F with PMH of DM, HTN, hyperlipidemia, neuropathy, PAD, and sciatica presenting to [facility] with c/o slurred speech and extremity weakness, admitted 02/21/22  and being treated for facial weakness, chronic renal impairment, and elevated troponin. Pt was recently admitted to [facility] due to stroke-like symptoms last week and was d/c home. After admission to [facility], pt had a code stroke called last night, 02/22/22, due to pt demonstrating new onset of increased confusion, L facial droop, R sided weakness, and slurred speech. Pt's MRI results show a chronic lacunar infarct of the L basal ganglia as well as an acute infarction of L paracentral lobule and region of the white matter of L motor cortex. Per pt report from initial eval, pt lives with her daughter in a one-story home with a ramp entrance, was receiving assist with all ADLs from her daughter and was mod I using a wheelchair at [employer] owned includes: rollator and RW. Pt's daughter reports pt would use the RW to stand pivot transfer to the [facility] and would use the rollator for others to push pt when she was feeling weak. Pt's daughter educated on gait belt and safe stand pivot transfer technique during initial eval as pt's daughter stated she recently purchased a gait belt to assist with transferring pt. Pt was initially evaluated and placed on OT caseload on 2/23/22, transferred to ICU on 2/25/22 2' to medical decline and then transferred to [address] [place] [address] on 3/03/22 for progression of care.  Pt RA on 3/4, seen for 5 skilled OT tx sessions making limited progress with therapy, due for new RA today 2' LOS. Based on current observations, pt continues to present with deficits in generalized strength/AROM, bed mobility, static/dynamic sitting balance, functional activity tolerance, coordination, cognition/decreased command following, and pain impacting overall performance of ADLs and functional transfers/mobility. Pt semi supine in bed with family present at bedside, unable to verbalize AXO at this time. Pt currently requires max-total A for rolling (max to R, total A to L), total A toileting at bed level and total A basic face washing at bed level 2' to limited BUE AROM/strength. Simulated total A LB dressing (bunny boots) at this time. Pt participated in B UE HEP (AAROM-PROM), see below for details. Overall, pt continues to present as deconditioned with limited progress towards initial acute OT goals, however pt did actively try to participate with therapy today and would benefit from cont skilled OT services to maximize IND/safety with self cares/mobility. Adjusted pt's goals/POC to reflect current progress/participation. Will continue to progress as tolerated. Current OT recommendation SNF at discharge once medically appropriate. Other factors to consider for discharge: family support, DME, time since onset, severity of deficits, decline from functional baseline      Patient will benefit from skilled therapy intervention to address the above noted impairments. PLAN :  Recommendations and Planned Interventions: self care training, functional mobility training, therapeutic exercise, balance training, therapeutic activities, cognitive retraining, endurance activities, neuromuscular re-education, patient education and family training/education    Recommend with staff: Frequent repositioning to prevent skin breakdown     Recommend next session: UB dressing    Frequency/Duration: Patient will be followed by occupational therapy:  1-2x/week to address goals.     Recommendation for discharge: (in order for the patient to meet his/her long term goals)  Elie Patel    This discharge recommendation:  Has been made in collaboration with the attending provider and/or case management       SUBJECTIVE:   Patient stated yes when asked if she had pain with PROM to LEs    OBJECTIVE DATA SUMMARY:   HISTORY:   Past Medical History:   Diagnosis Date    Diabetes mellitus ([facility]     HTN (hypertension)     Hyperlipidemia     Neuropathy     PAD (peripheral artery disease) ([facility]     PCI    Sciatica      Past Surgical History:   Procedure Laterality Date    HX AMPUTATION Right     great toe    HX CERVICAL FUSION      HX OTHER SURGICAL Bilateral     Stent placement    HX OTHER SURGICAL      HX VASCULAR STENT Bilateral     2 in right 1 in left    HX VASCULAR STENT Bilateral        Expanded or extensive additional review of patient history:     Home Situation  Home Environment: Private residence  # Steps to Enter: 0  Wheelchair Ramp: Yes  One/Two Story Residence: One story  Living Alone: No  Support Systems: Child(nelida)  Patient Expects to be Discharged to[de-identified] Skilled nursing facility  Current DME Used/Available at Home: [employer], rollator,Walker, rolling,Com[employer], bedside      EXAMINATION OF PERFORMANCE DEFICITS:  Cognitive/Behavioral Status:  Neurologic State: Drowsy (Simultaneous filing. User may not have seen previous data.)  Orientation Level: Unable to verbalize (Simultaneous filing.  User may not have seen previous data.)  Cognition: Decreased command following;Decreased attention/concentration    Skin: Mepilex pad noted to sacral area    Range of Motion:  AROM: Grossly decreased, non-functional  PROM: Generally decreased, functional                      Strength:  Strength: Grossly decreased, non-functional                Coordination:  Coordination: Generally decreased, functional  Fine Motor Skills-Upper: Left Impaired;Right Impaired         Tone & Sensation:  Tone: Abnormal Functional Mobility and Transfers for ADLs:  Bed Mobility:  Rolling: Maximum assistance; Total assistance      ADL Intervention and task modifications:       Grooming  Grooming Assistance: Total assistance(dependent)  Position Performed: Long sitting on bed  Washing Face: Total assistance (dependent)                   Lower Body Dressing Assistance  Shoes with Velcro: Total assistance (dependent) (Simulated bunny boots)    Toileting  Toileting Assistance: Total assistance(dependent)  Bowel Hygiene: Total assistance (dependent)         Therapeutic Exercise:  Exercise Sets Reps AROM AAROM PROM Self PROM Comments   Shoulder flex/ext 1 10 [] [x] [x] [] AAROM L UE PROM R UE   Elbow flex/ext 1 10 [] [x] [x] [] AAROM L UE PROM R UE   Wrist flex/ext 1 10 [] [x] [x] [] AAROM L UE PROM R UE   Hand/finger flex ext  1 10 [] [x] [x] [] AAROM L UE PROM R UE     Functional Measure:    [address] [place] [id number] AM-PACTM \"6 Clicks\"                                                       Daily Activity Inpatient Short Form  How much help from another person does the patient currently need. .. Total; A Lot A Little None   1. Putting on and taking off regular lower body clothing? [x]  1 []  2 []  3 []  4   2. Bathing (including washing, rinsing, drying)? [x]  1 []  2 []  3 []  4   3. Toileting, which includes using toilet, bedpan or urinal? [x] 1 []  2 []  3 []  4   4. Putting on and taking off regular upper body clothing? [x]  1 []  2 []  3 []  4   5. Taking care of personal grooming such as brushing teeth? [x]  1 []  2 []  3 []  4   6. Eating meals? [x]  1 []  2 []  3 []  4   © 2007, Trustees of [address] [place] [address] [address] [id number], under license to [employer]. All rights reserved     Score: 6/24     Interpretation of Tool:  Represents clinically-significant functional categories (i.e. Activities of daily living).   Percentage of Impairment CH    0%   CI    1-19% CJ    20-39% CK    40-59% CL    60-79% CM    80-99% CN     100%   AMPA  Score 6-24 24 23 20-22 15-19 10-14 7-9 6       Pain Rating:  Pt c/o pain to PROM R LE, FACES 3/10    Activity Tolerance:   Fair    After treatment patient left in no apparent distress:    Supine in bed, Heels elevated for pressure relief, Patient positioned with wedge pillow to R side for pressure relief, Call bell within reach, Caregiver / family present and Side rails x 3    COMMUNICATION/EDUCATION:   The patients plan of care was discussed with: Physical therapist, Registered nurse and Case management. OT/PT sessions occurred together for increased patient and clinician safety as pt with decreased activity tolerance and requires A of 2 for mobility at this time.      Thank you for this referral.  Satish Barthel  Time Calculation: 27 mins

## 2022-04-04 PROBLEM — N39.0 UTI (URINARY TRACT INFECTION): Status: ACTIVE | Noted: 2022-01-01

## 2022-04-04 PROBLEM — N17.9 AKI (ACUTE KIDNEY INJURY) (HCC): Status: ACTIVE | Noted: 2022-01-01

## 2022-04-04 PROBLEM — E87.5 HYPERKALEMIA: Status: ACTIVE | Noted: 2022-01-01

## 2022-04-04 PROBLEM — A41.9 SEPSIS (HCC): Status: ACTIVE | Noted: 2022-01-01

## 2022-04-04 PROBLEM — R41.82 AMS (ALTERED MENTAL STATUS): Status: ACTIVE | Noted: 2022-01-01

## 2022-04-04 NOTE — PROGRESS NOTES
Patient having some noticeable wheezing in the chest area. Dr. Gilberto Shelton called and left a voicemail. Dr. Gilberto Shelton called back to [place] [id number] and spoke with [employer] and stated he was coming to the floor to see the patient and write orders. Currently waiting for Dr. Gilberto Shelton to come to the floor.      Signed By: Spencer Sanford RN     April 4, 2022

## 2022-04-04 NOTE — CONSULTS
IMPRESSION:   1. Severe sepsis  2. Urinary tract infection  3. Acute kidney injury  4. Hyperkalemia  5. History of CVA with right-sided weakness  6. Anemia  7. Additional workup outlined below  8. Pt is at high risk of sudden decline and decompensation with life threatening consequenses and continued end organ dysfunction and failure  9. Pt is critically ill. Time spent with pt and staff actively rendering care, managing pt and coordinating care as stated below; 55 minutes, exclusive of any procedures      RECOMMENDATIONS/PLAN:   1. ICU monitoring  1. Start patient Zosyn vancomycin  2. Probably dehydration sepsis infection start patient on IV fluid  3. Agree with Empiric IV antibiotics pending culture results   4. Follow culture results  5. CVP monitoring  6. Chest x-ray no acute infiltrate  7. IV vasopressors for circulatory shock refractory to fluids to maintain SBP> 90  8. Transfuse prn to maintain Hgb > 7  9. Labs to follow electrolytes, renal function and and blood counts  10. Bronchial hygiene with respiratory therapy techniques, bronchodilators  11. Pt needs IV fluids with additives and Drug therapy requiring intensive monitoring for toxicity  12. Prescription drug management with home med reconciliation reviewed  13. DVT, SUP prophylaxis  14.  Will be available to assist in medical management while in the CCU pending disposition     [x] High complexity decision making was performed  [x] See my orders for details  HPI  79-year-old lady nursing home resident came in as she was found confused with altered mental status family noted that she was not acting properly she is in a nursing home unable to get any history to the patient she has significant past medical history of CVA right-sided weakness hypertension diabetes mellitus she is bedridden open eyes does not follow any command possibly septic so admitted, and critical care consult was called  PMH:  has a past medical history of Diabetes mellitus ([address] HTN (hypertension), Hyperlipidemia, Neuropathy, PAD (peripheral artery disease) ([address] and Sciatica. PSH:   has a past surgical history that includes hx other surgical (Bilateral); hx amputation (Right); hx other surgical; hx cervical fusion; hx vascular stent (Bilateral); and hx vascular stent (Bilateral). FHX: family history includes Cancer in her mother; Diabetes in her mother; Hypertension in her mother. SHX:  reports that she has never smoked. She has never used smokeless tobacco. She reports previous alcohol use. She reports that she does not use drugs.     ALL: No Known Allergies     MEDS:   [x] Reviewed - As Below   [] Not reviewed    Current Facility-Administered Medications   Medication    atorvastatin (LIPITOR) tablet 20 mg    ferrous sulfate tablet 325 mg    latanoprost (XALATAN) 0.005 % ophthalmic solution 1 Drop    aspirin delayed-release tablet 81 mg    brimonidine (ALPHAGAN) 0.2 % ophthalmic solution 1 Drop    insulin lispro (HUMALOG) injection    glucose chewable tablet 16 g    glucagon (GLUCAGEN) injection 1 mg    acetaminophen (TYLENOL) tablet 650 mg    Or    acetaminophen (TYLENOL) suppository 650 mg    polyethylene glycol (MIRALAX) packet 17 g    ondansetron (ZOFRAN ODT) tablet 4 mg    Or    ondansetron (ZOFRAN) injection 4 mg    heparin (porcine) injection 5,000 Units    0.45% sodium chloride infusion    piperacillin-tazobactam (ZOSYN) 3.375 g in 0.9% sodium chloride (MBP/ADV) 100 mL MBP    Vancomycin Pharmacy Dosing    [START ON 4/5/2022] Vancomyin Random Level 4-5-22 at 4am      [facility] reviewed and pertinent medications noted or modified as needed   Current Facility-Administered Medications   Medication    atorvastatin (LIPITOR) tablet 20 mg    ferrous sulfate tablet 325 mg    latanoprost (XALATAN) 0.005 % ophthalmic solution 1 Drop    aspirin delayed-release tablet 81 mg    brimonidine (ALPHAGAN) 0.2 % ophthalmic solution 1 Drop    insulin lispro (HUMALOG) injection    glucose chewable tablet 16 g    glucagon (GLUCAGEN) injection 1 mg    acetaminophen (TYLENOL) tablet 650 mg    Or    acetaminophen (TYLENOL) suppository 650 mg    polyethylene glycol (MIRALAX) packet 17 g    ondansetron (ZOFRAN ODT) tablet 4 mg    Or    ondansetron (ZOFRAN) injection 4 mg    heparin (porcine) injection 5,000 Units    0.45% sodium chloride infusion    piperacillin-tazobactam (ZOSYN) 3.375 g in 0.9% sodium chloride (MBP/ADV) 100 mL MBP    Vancomycin Pharmacy Dosing    [START ON 2022] Vancomyin Random Level 22 at 4am      PMH:  has a past medical history of Diabetes mellitus ([facility] HTN (hypertension), Hyperlipidemia, Neuropathy, PAD (peripheral artery disease) ([facility] and Sciatica. PSH:   has a past surgical history that includes hx other surgical (Bilateral); hx amputation (Right); hx other surgical; hx cervical fusion; hx vascular stent (Bilateral); and hx vascular stent (Bilateral). FHX: family history includes Cancer in her mother; Diabetes in her mother; Hypertension in her mother. SHX:  reports that she has never smoked. She has never used smokeless tobacco. She reports previous alcohol use. She reports that she does not use drugs. ROS:  Unable to obtain unresponsive    Hemodynamics:    CO:    CI:    CVP:    SVR:   PAP Systolic:    PAP Diastolic:    PVR:    NC63:        Ventilator Settings:      Mode Rate TV Press PEEP FiO2 PIP Min.  Vent                              Vital Signs: Telemetry:    normal sinus rhythm Intake/Output:   Visit Vitals  BP (!) 152/56 (BP 1 Location: Left upper arm, BP Patient Position: At rest)   Pulse 76   Temp 98.6 °F (37 °C)   Resp 20   Ht 5' 8\" (1.727 m)   Wt 80.9 kg (178 lb 5.6 oz)   SpO2 100%   BMI 27.12 kg/m²       Temp (24hrs), Av.3 °F (37.9 °C), Min:98.6 °F (37 °C), Max:102.6 °F (39.2 °C)        O2 Device: None (Room air) O2 Flow Rate (L/min): 2 l/min       Wt Readings from Last 4 Encounters:   22 80.9 kg (178 lb 5.6 oz)   02/27/22 82.2 kg (181 lb 3.5 oz)   07/21/20 84.4 kg (186 lb)   02/17/20 84.8 kg (187 lb)          Intake/Output Summary (Last 24 hours) at 4/4/2022 0930  Last data filed at 4/4/2022 0600  Gross per 24 hour   Intake 2510 ml   Output 500 ml   Net 2010 ml       Last shift:      No intake/output data recorded. Last 3 shifts: 04/02 1901 - 04/04 0700  In: 2510 [I.V.:2510]  Out: 500 [Urine:500]       Physical Exam:     General: Open eyes but does not follow any command  HEENT: NCAT, poor dentition, lips and mucosa dry  Eyes: anicteric; conjunctiva clear  Neck: no nodes, trach midline; no accessory MM use. Chest: no deformity,   Cardiac: R regular; no murmur;   Lungs: distant breath sounds; no wheezes  Abd: soft, NT, hypoactive BS  Ext: no edema; no joint swelling;  No clubbing  : NO giraldo, clear urine  Neuro: She only moves her left arm  Psych- unable to assess  Skin: warm, dry, no cyanosis;   Pulses: 1-2+ Bilateral pedal, radial  Capillary: brisk; pale      DATA:    MAR reviewed and pertinent medications noted or modified as needed  MEDS:   Current Facility-Administered Medications   Medication    atorvastatin (LIPITOR) tablet 20 mg    ferrous sulfate tablet 325 mg    latanoprost (XALATAN) 0.005 % ophthalmic solution 1 Drop    aspirin delayed-release tablet 81 mg    brimonidine (ALPHAGAN) 0.2 % ophthalmic solution 1 Drop    insulin lispro (HUMALOG) injection    glucose chewable tablet 16 g    glucagon (GLUCAGEN) injection 1 mg    acetaminophen (TYLENOL) tablet 650 mg    Or    acetaminophen (TYLENOL) suppository 650 mg    polyethylene glycol (MIRALAX) packet 17 g    ondansetron (ZOFRAN ODT) tablet 4 mg    Or    ondansetron (ZOFRAN) injection 4 mg    heparin (porcine) injection 5,000 Units    0.45% sodium chloride infusion    piperacillin-tazobactam (ZOSYN) 3.375 g in 0.9% sodium chloride (MBP/ADV) 100 mL MBP    Vancomycin Pharmacy Dosing    [START ON 4/5/2022] Vancomyin Random Level 4-5-22 at 4am        Labs:    Recent Labs     04/03/22  2243   WBC 16.9*   HGB 7.3*        Recent Labs     04/04/22  0600 04/04/22  0254 04/03/22  2243   NA  --  147* 144   K  --  5.6* 6.2*   CL  --  120* 114*   CO2  --  23 25   GLU  --  223* 272*   BUN  --  94* 105*   CREA  --  2.41* 3.00*   CA  --  8.2* 8.3*   LAC 1.2  --  1.7   ALB  --   --  2.0*   ALT  --   --  94*     Recent Labs     04/04/22  0232 04/03/22  2243   PH 7.39  --    PCO2 39  --    PO2 121*  --    HCO3 23  --    FIO2  --  21.0     No results for input(s): CPK, CKNDX, TROIQ in the last 72 hours. No lab exists for component: CPKMB  No results found for: BNPP, BNP   Lab Results   Component Value Date/Time    Culture result: No growth 6 days 03/05/2022 10:16 AM    Culture result: Heavy Escherichia coli 02/28/2022 08:15 PM    Culture result: Moderate  Mixed skin yasmine isolated   02/28/2022 08:15 PM     Lab Results   Component Value Date/Time    TSH 2.880 12/12/2016 10:13 AM        Imaging:    Results from [place] encounter on 04/03/22    XR CHEST PORT    Narrative  Upright radiograph chest 10:46 p.m. compared to January 25, 2022. INDICATION: Altered mental status. Heart size is enlarged with an atherosclerotic aorta. No significant infiltrate  or effusion. Bones appear intact. Degenerative changes of the shoulders. Postfusion changes lower cervical spine. Impression  No acute findings. Results from [place] encounter on 04/03/22    CT HEAD WO CONT    Narrative  PROCEDURE: CT HEAD WO CONT    HISTORY:Altered mental status    COMPARISON:Head CT 3 March 2022    Department policy stipulates all CT scans at this facility are performed using  dose reduction optimization techniques as appropriate to the performed exam,  including the following: Automated exposure control, adjustments of the mA  and/or KVP according to the patient size, and the use of iterative  reconstruction technique.     TECHNIQUE: Without IV contrast    Bones/extracranial soft tissues:  Maxillary sinuses and right frontal sinus  remains nearly completely opacified. Extra-axial spaces:  No hemorrhage, mass or focal fluid collection. Ventricles/sulci:  There is mild dilatation of the ventricles. Sulci are  prominent. Brain parenchyma: An area of encephalomalacia in the left frontal lobe is  showing chronic evolution without evidence of hemorrhagic conversion. No other  focal lesions identified. No mass effect. There is good gray-white differentiation. There are  inhomogeneous areas of mildly decreased density in periventricular white matter. Impression  Chronic changes which appear stable. No acute or active intracranial process. Chronic paranasal sinus disease      This care involved high complexity decision making which includes independently reviewing the patient's past medical records, current laboratory results, medication profiles that were immediately available to me and actual Xray images at the bedside in order to assess, support vital system function, and to treat this degree of vital organ system failure, and to prevent further life threatening deterioration of the patients condition. I was in direct communication with the nursing staff throughout this time. I have provided total of 55 minutes of critical care time rendering care exclusive of any procedures. During this entire length of time the patient's condition was unstable, unpredictable and critically ill in the CCU/ ICU. I was immediately available to the patient whose care required several interactions with nursing, multidisciplinary team members leading to multiple interventions with fluid resuscitation and medication adjustments to optimize respiratory support, hemodynamic treatment, medication changes based on repeat labs results, reviews, exams and assessments.  The reason for providing this level of medical care was due to a critical illness that impaired one or more vital organ systems, such that there was a high probability of sudden or life threatening deterioration in the patient's condition.

## 2022-04-04 NOTE — CONSULTS
RENAL    Called to see patient. She has been followed by [facility] the last 2 recent admits. I redirected consult to them.     Omero Tran MD

## 2022-04-04 NOTE — PROGRESS NOTES
CM attempted to reach out to the patient's daughter, Jennifer Deng, at [phone] with no answer. Voice message left requesting returned call to complete discharge planning assessment.

## 2022-04-04 NOTE — ED PROVIDER NOTES
EMERGENCY DEPARTMENT HISTORY AND PHYSICAL EXAM      Date: 4/3/2022  Patient Name: James Watts      History of Presenting Illness     Chief Complaint   Patient presents with    Altered mental status       History Provided By: EMS    HPI: James Watts, 78 y.o. 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 Reportedly per SNF staff patient is usually oriented and following commands but was noted this evening to be altered and not following commands. In the ED, patient appears altered and not following commands. I was unable to obtain adequate HPI from the patient due to her mental status. There are no other complaints, changes, or physical findings at this time. PCP: Kateryna Lyn MD    Current Facility-Administered Medications   Medication Dose Route Frequency Provider Last Rate Last Admin    0.9% sodium chloride infusion  100 mL/hr IntraVENous CONTINUOUS Al Christian Siu  mL/hr at 04/04/22 0317 100 mL/hr at 04/04/22 0317       Past History     Past Medical History:  Past Medical History:   Diagnosis Date    Diabetes mellitus ([address]     HTN (hypertension)     Hyperlipidemia     Neuropathy     PAD (peripheral artery disease) (HCC)     PCI    Sciatica        Past Surgical History:  Past Surgical History:   Procedure Laterality Date    HX AMPUTATION Right     great toe    HX CERVICAL FUSION      HX OTHER SURGICAL Bilateral     Stent placement    HX OTHER SURGICAL      HX VASCULAR STENT Bilateral     2 in right 1 in left    HX VASCULAR STENT Bilateral        Family History:  Family History   Problem Relation Age of Onset    Cancer Mother     Diabetes Mother     Hypertension Mother        Social History:  Social History     Tobacco Use    Smoking status: Never Smoker    Smokeless tobacco: Never Used   Substance Use Topics    Alcohol use: Not Currently    Drug use:  No Allergies:  No Known Allergies      Review of Systems     Review of Systems   Unable to perform ROS: Mental status change       Physical Exam     Physical Exam  Vitals and nursing note reviewed. Constitutional:       Appearance: She is ill-appearing. She is not diaphoretic. HENT:      Head: Normocephalic and atraumatic. Cardiovascular:      Rate and Rhythm: Normal rate and regular rhythm. Pulmonary:      Effort: Pulmonary effort is normal.      Breath sounds: Normal breath sounds. Abdominal:      General: There is no distension. Palpations: Abdomen is soft. Tenderness: There is no abdominal tenderness. Comments: PEG tube site CDI   Musculoskeletal:      Right lower leg: No edema. Left lower leg: No edema. Skin:     General: Skin is warm and dry. Neurological:      Mental Status: She is alert. She is disoriented. Lab and Diagnostic Study Results     Labs -     Recent Results (from the past 12 hour(s))   GLUCOSE, POC    Collection Time: 04/03/22 10:34 PM   Result Value Ref Range    Glucose (POC) 279 (H) 65 - 117 mg/dL    Performed by [employer]    CBC WITH AUTOMATED DIFF    Collection Time: 04/03/22 10:43 PM   Result Value Ref Range    WBC 16.9 (H) 3.6 - 11.0 K/uL    RBC 2.65 (L) 3.80 - 5.20 M/uL    HGB 7.3 (L) 11.5 - 16.0 g/dL    HCT 24.6 (L) 35.0 - 47.0 %    MCV 92.8 80.0 - 99.0 FL    MCH 27.5 26.0 - 34.0 PG    MCHC 29.7 (L) 30.0 - 36.5 g/dL    RDW 17.7 (H) 11.5 - 14.5 %    PLATELET 971 576 - 665 K/uL    MPV 11.5 8.9 - 12.9 FL    NRBC 0.0 0.0  WBC    ABSOLUTE NRBC 0.00 0.00 - 0.01 K/uL    NEUTROPHILS 73 32 - 75 %    LYMPHOCYTES 18 12 - 49 %    MONOCYTES 7 5 - 13 %    EOSINOPHILS 0 0 - 7 %    BASOPHILS 1 0 - 1 %    IMMATURE GRANULOCYTES 1 (H) 0 - 0.5 %    ABS. NEUTROPHILS 12.5 (H) 1.8 - 8.0 K/UL    ABS. LYMPHOCYTES 3.0 0.8 - 3.5 K/UL    ABS. MONOCYTES 1.2 (H) 0.0 - 1.0 K/UL    ABS. EOSINOPHILS 0.1 0.0 - 0.4 K/UL    ABS. BASOPHILS 0.1 0.0 - 0.1 K/UL    ABS. IMM. Robertson Nett. 0.1 (H) 0.00 - 0.04 K/UL    DF AUTOMATED     METABOLIC PANEL, COMPREHENSIVE    Collection Time: 04/03/22 10:43 PM   Result Value Ref Range    Sodium 144 136 - 145 mmol/L    Potassium 6.2 (H) 3.5 - 5.1 mmol/L    Chloride 114 (H) 97 - 108 mmol/L    CO2 25 21 - 32 mmol/L    Anion gap 5 5 - 15 mmol/L    Glucose 272 (H) 65 - 100 mg/dL     (H) 6 - 20 mg/dL    Creatinine 3.00 (H) 0.55 - 1.02 mg/dL    BUN/Creatinine ratio 35 (H) 12 - 20      GFR est AA 18 (L) >60 ml/min/1.73m2    GFR est non-AA 15 (L) >60 ml/min/1.73m2    Calcium 8.3 (L) 8.5 - 10.1 mg/dL    Bilirubin, total 0.4 0.2 - 1.0 mg/dL    AST (SGOT) 128 (H) 15 - 37 U/L    ALT (SGPT) 94 (H) 12 - 78 U/L    Alk.  phosphatase 133 (H) 45 - 117 U/L    Protein, total 7.4 6.4 - 8.2 g/dL    Albumin 2.0 (L) 3.5 - 5.0 g/dL    Globulin 5.4 (H) 2.0 - 4.0 g/dL    A-G Ratio 0.4 (L) 1.1 - 2.2     LACTIC ACID    Collection Time: 04/03/22 10:43 PM   Result Value Ref Range    Lactic acid 1.7 0.4 - 2.0 mmol/L   URINALYSIS W/ REFLEX CULTURE    Collection Time: 04/03/22 10:43 PM    Specimen: Urine   Result Value Ref Range    Color Yellow      Appearance Turbid (A) Clear      Specific gravity 1.014 1.003 - 1.030      pH (UA) 7.0 5.0 - 8.0      Protein 100 (A) Negative mg/dL    Glucose Negative Negative mg/dL    Ketone Negative Negative mg/dL    Bilirubin Negative Negative      Blood Large (A) Negative      Urobilinogen 0.1 0.1 - 1.0 EU/dL    Nitrites Negative Negative      Leukocyte Esterase Moderate (A) Negative      UA:UC IF INDICATED Urine Culture Ordered (A) Culture not indicated by UA result      WBC >100 (H) 0 - 4 /hpf    RBC >100 (H) 0 - 5 /hpf    Bacteria 1+ (A) Negative /hpf    PRESUMPTIVE YEAST Occasional      Other Urine Micro 7     VENOUS BLOOD GAS    Collection Time: 04/03/22 10:43 PM   Result Value Ref Range    VENOUS PH 7.422 (H) 7.31 - 7.41      VENOUS PCO2 39.5 (L) 40 - 50 mmHg    VENOUS PO2 163 (H) 36 - 42 mmHg    VENOUS O2 SATURATION 101 (H) 60 - 80 % VENOUS BICARBONATE 26 22 - 26 mmol/L    VENOUS BASE EXCESS 1.2 0 - 2 mmol/L    O2 METHOD Room air      FIO2 21.0 %    SITE RV     COVID-19 RAPID TEST    Collection Time: 04/03/22 11:24 PM   Result Value Ref Range    COVID-19 rapid test Not Detected Not Detected     BLOOD GAS, ARTERIAL    Collection Time: 04/04/22  2:32 AM   Result Value Ref Range    pH 7.39 7.35 - 7.45      PCO2 39 35 - 45 mmHg    PO2 121 (H) 75 - 100 mmHg    O2  >95 %    BICARBONATE 23 22 - 26 mmol/L    BASE DEFICIT 1.5 0 - 2 mmol/L    O2 METHOD Nasal Cannula      O2 FLOW RATE 2 L/min    SITE Right Brachial      EVELIN'S TEST PASS     METABOLIC PANEL, BASIC    Collection Time: 04/04/22  2:54 AM   Result Value Ref Range    Sodium 147 (H) 136 - 145 mmol/L    Potassium 5.6 (H) 3.5 - 5.1 mmol/L    Chloride 120 (H) 97 - 108 mmol/L    CO2 23 21 - 32 mmol/L    Anion gap 4 (L) 5 - 15 mmol/L    Glucose 223 (H) 65 - 100 mg/dL    BUN 94 (H) 6 - 20 mg/dL    Creatinine 2.41 (H) 0.55 - 1.02 mg/dL    BUN/Creatinine ratio 39 (H) 12 - 20      GFR est AA 24 (L) >60 ml/min/1.73m2    GFR est non-AA 20 (L) >60 ml/min/1.73m2    Calcium 8.2 (L) 8.5 - 10.1 mg/dL   GLUCOSE, POC    Collection Time: 04/04/22  3:04 AM   Result Value Ref Range    Glucose (POC) 216 (H) 65 - 117 mg/dL    Performed by Sher Muñiz (RN)        Radiologic Studies -   [unfilled]  CT Results  (Last 48 hours)               04/04/22 0031  CT HEAD WO CONT Final result    Impression:  Chronic changes which appear stable. No acute or active intracranial process.    Chronic paranasal sinus disease           Narrative:  PROCEDURE: CT HEAD WO CONT       HISTORY:Altered mental status       COMPARISON:Head CT 3 March 2022       Department policy stipulates all CT scans at this facility are performed using   dose reduction optimization techniques as appropriate to the performed exam,   including the following: Automated exposure control, adjustments of the mA   and/or KVP according to the patient size, and the use of iterative   reconstruction technique. TECHNIQUE: Without IV contrast       Bones/extracranial soft tissues:  Maxillary sinuses and right frontal sinus   remains nearly completely opacified. Extra-axial spaces:  No hemorrhage, mass or focal fluid collection. Ventricles/sulci:  There is mild dilatation of the ventricles. Sulci are   prominent. Brain parenchyma: An area of encephalomalacia in the left frontal lobe is   showing chronic evolution without evidence of hemorrhagic conversion. No other   focal lesions identified. No mass effect. There is good gray-white differentiation. There are   inhomogeneous areas of mildly decreased density in periventricular white matter. CXR Results  (Last 48 hours)               04/03/22 4521  XR CHEST PORT Final result    Impression:  No acute findings. Narrative:  Upright radiograph chest 10:46 p.m. compared to January 25, 2022. INDICATION: Altered mental status. Heart size is enlarged with an atherosclerotic aorta. No significant infiltrate   or effusion. Bones appear intact. Degenerative changes of the shoulders. Postfusion changes lower cervical spine. Medical Decision Making and ED Course   - I am the first and primary provider for this patient AND AM THE PRIMARY PROVIDER OF RECORD. - I reviewed the vital signs, available nursing notes, past medical history, past surgical history, family history and social history. - Initial assessment performed. The patients presenting problems have been discussed, and the staff are in agreement with the care plan formulated and outlined with them. I have encouraged them to ask questions as they arise throughout their visit. Vital Signs-Reviewed the patient's vital signs.     Patient Vitals for the past 24 hrs:   Temp Pulse Resp BP SpO2   04/04/22 0345 99.1 °F (37.3 °C) 69 23 (!) 110/46 99 %   04/04/22 0240 99.3 °F (37.4 °C) 85 (!) 34 (!) 146/60 100 %   04/04/22 0158 99.5 °F (37.5 °C) 88 (!) 36 (!) 142/60 100 %   04/04/22 0041 100.2 °F (37.9 °C) 91 30 (!) 153/62 100 %   04/03/22 2312 (!) 102.6 °F (39.2 °C) 86 (!) 34 (!) 130/53 100 %   04/03/22 2311 (!) 102.6 °F (39.2 °C) 86 (!) 34 (!) 130/53 100 %   04/03/22 2236 (!) 102 °F (38.9 °C) 94 (!) 36 (!) 146/52 100 %       Records Reviewed: Nursing Notes and Old Medical Records    Provider Notes (Medical Decision Making):       ED Course as of 04/04/22 0434   Sun Apr 03, 2022   2300 Patient presented to emerge department with fever and altered mental status. Bell was examined and showed turbid colored urine. Concern for UTI as a cause of her altered mental status. We will get labs including CBC, chemistry, lactate, VBG, urinalysis, blood cultures, Covid test, chest x-ray, CT of the head and will initiate the patient on broad-spectrum antibiotics with vancomycin and cefepime. [AA]   Mon Apr 04, 2022   0010 Potassium(!): 6.2  Will order insulin, albuterol, and calcium for hyperkalemia. [AA]   0011 GFR est non-AA(!): 15  Significantly worse from baseline. I suspect that the patient RICARDO secondary to severe sepsis and dehydration. We will continue resuscitation with intravenous fluid. [AA]   0145 WBC(!): >100  UA positive for UTI. Patient is already covered with broad-spectrum antibiotics. [AA]   0200 Resp Rate(!): 36  She continues to be tachypneic. She had an x-ray of her chest, Covid swab, and VBG which did not reveal a cause for her tachypnea. Her lungs sounded clear bilaterally as well. We will get an ABG to rule out the possibility of CO2 retention. [AA]   0245 ABG is within normal.  Discussed with the hospitalist who requested that I speak to nephrology to see if there are any other recommendations. Otherwise, agrees with admission to the ICU and has no further recommendations at this point. [AA]   0300 Discussed with Dr. Juan García from nephrology. Presentation discussed.   Recommended continuing IV fluids without other interventions. [AA]   0345 Potassium(!): 5.6  Hyperkalemia improved. Also, RICARDO is improving. We will continue IV fluid infusion and admit the patient to the ICU. [AA]      ED Course User Index  [AA] Michael Davila MD         Procedures and Critical Care       CRITICAL CARE NOTE :  10:37 PM  Amount of Critical Care Time: 55 (minutes)    IMPENDING DETERIORATION -Respiratory, Cardiovascular, Metabolic and Sepsis  ASSOCIATED RISK FACTORS - Metabolic changes, Dehydration and Sepsis  MANAGEMENT- Bedside Assessment, Supervision of Care, intravenous fluids, antibiotics, and hyperkalemia management  INTERPRETATION -  Xrays, Blood Gases, ECG and Blood Pressure  INTERVENTIONS - hemodynamic mngmt, Metobolic interventions and ABx  CASE REVIEW - Hospitalist/Intensivist, Medical Sub-Specialist, Nursing and Family  TREATMENT RESPONSE -Stable  PERFORMED BY - Self    NOTES   :  I have spent critical care time involved in lab review, consultations with specialist, family decision- making, bedside attention and documentation. This time excludes time spent in any separate billed procedures. During this entire length of time I was immediately available to the patient . Karo Méndez MD        Disposition     Disposition: Condition fair  Admitted to ICU Medical ICU the case was discussed with the admitting physician Dr. Mikie Mcnamara    Admitted        Diagnosis     Clinical Impression:   1. Altered mental status, unspecified altered mental status type    2. Urinary tract infection with hematuria, site unspecified    3. RICARDO (acute kidney injury) ([address]    4. Sepsis, due to unspecified organism, unspecified whether acute organ dysfunction present ([address]    5. Acute hyperkalemia        Attestations: Karo Méndez MD    Please note that this dictation was completed with [employer], the [employer] voice recognition software.   Quite often unanticipated grammatical, syntax, homophones, and other interpretive errors are inadvertently transcribed by the computer software. Please disregard these errors. Please excuse any errors that have escaped final proofreading. Thank you.

## 2022-04-04 NOTE — H&P
History and Physical    NAME: Jessenia Hudson   :  1945   MRN:  [id number]     Date/Time:  2022 10:39 AM    Patient PCP: Christine Lira MD  ______________________________________________________________________             Subjective:     CHIEF COMPLAINT:         HISTORY OF PRESENT ILLNESS:       Patient is a 78y.o. year old female with signal past medical history of CVA with PEG tube left great toe gangrene, chronic kidney disease CVA with right-sided weakness hypertension type 2 diabetes bedridden open eyes do not follow any command was transferred to the hospital with fever and altered mental status as per SNF staff patient was awake and following simple command at the nursing home facility and since yesterday not able to follow any command confused transferred to the ER seen by the ER physician work-up shows acute kidney injury with hyperkalemia      Admitted for further evaluation and treat    Past Medical History:   Diagnosis Date    Diabetes mellitus ([facility] [address]     HTN (hypertension)     Hyperlipidemia     Neuropathy     PAD (peripheral artery disease) ([facility] [address]     PCI    Sciatica         Past Surgical History:   Procedure Laterality Date    HX AMPUTATION Right     great toe    HX CERVICAL FUSION      HX OTHER SURGICAL Bilateral     Stent placement    HX OTHER SURGICAL      HX VASCULAR STENT Bilateral     2 in right 1 in left    HX VASCULAR STENT Bilateral        Social History     Tobacco Use    Smoking status: Never Smoker    Smokeless tobacco: Never Used   Substance Use Topics    Alcohol use: Not Currently        Family History   Problem Relation Age of Onset    Cancer Mother     Diabetes Mother     Hypertension Mother        No Known Allergies     Prior to Admission medications    Medication Sig Start Date End Date Taking? Authorizing Provider   gabapentin (NEURONTIN) 300 mg capsule Take 1 Capsule by mouth two (2) times a day.  Max Daily Amount: 600 mg. 3/14/22   Radha Noble MD MANUEL   insulin glargine (LANTUS) 100 unit/mL injection 50 Units by SubCUTAneous route daily. 3/14/22   Anitha Lubin MD   acidophilus-pectin, citrus 25 million cell -100 mg tab tablet Take 2 Tablets by mouth daily. 3/15/22   Samantha JACKSON MD   amLODIPine (NORVASC) 5 mg tablet Take 1 Tablet by mouth daily. 3/14/22   Anitha Lubin MD   artificial saliva (MOUTH KOTE) spra Take 1 Spray by mouth three (3) times daily. 3/14/22   Samantha JACKSON MD   latanoprost (XALATAN) 0.005 % ophthalmic solution Administer 1 Drop to right eye every evening. 3/14/22   Anitha Lubin MD   lidocaine (XYLOCAINE) 4 % (40 mg/mL) topical solution Apply 1 mL to affected area as needed for Pain. 3/14/22   Anitha Lubin MD   metoprolol succinate (TOPROL-XL) 50 mg XL tablet Take 1 Tablet by mouth two (2) times a day. 3/14/22   Anitha Lubin MD   atorvastatin (LIPITOR) 20 mg tablet TAKE 1 TABLET BY MOUTH EVERY NIGHT 2/3/22   Noemi Hamm MD   brimonidine (ALPHAGAN) 0.2 % ophthalmic solution Administer 1 Drop to both eyes three (3) times daily. Provider, Historical   aspirin delayed-release 81 mg tablet Take  by mouth daily. Provider, Historical   ferrous sulfate 325 mg (65 mg iron) tablet Take 1 Tab by mouth two (2) times a day.  12/19/17   Noemi Hamm MD         Current Facility-Administered Medications:     atorvastatin (LIPITOR) tablet 20 mg, 20 mg, Oral, QHS, Israel Soto MD, 20 mg at 04/04/22 0831    ferrous sulfate tablet 325 mg, 325 mg, Oral, BID, Israel Soto MD, 325 mg at 04/04/22 0831    latanoprost (XALATAN) 0.005 % ophthalmic solution 1 Drop, 1 Drop, Right Eye, QPM, Israel Soto MD    aspirin delayed-release tablet 81 mg, 81 mg, Oral, DAILY, Israel Soto MD, 81 mg at 04/04/22 0831    brimonidine (ALPHAGAN) 0.2 % ophthalmic solution 1 Drop, 1 Drop, Both Eyes, TID, Israel Soto MD, 1 Drop at 04/04/22 0836    insulin lispro (HUMALOG) injection, , SubCUTAneous, AC&HS, Abril Sherwood MD, 7 Units at 04/04/22 0830    glucose chewable tablet 16 g, 4 Tablet, Oral, PRN, Jonah Soto MD    glucagon [facility]) injection 1 mg, 1 mg, IntraMUSCular, PRN, Jonah Soto MD    acetaminophen (TYLENOL) tablet 650 mg, 650 mg, Oral, Q6H PRN **OR** acetaminophen (TYLENOL) suppository 650 mg, 650 mg, Rectal, Q6H PRN, Jonah Soto MD    polyethylene glycol (MIRALAX) packet 17 g, 17 g, Oral, DAILY PRN, Jonah Soto MD    ondansetron (ZOFRAN ODT) tablet 4 mg, 4 mg, Oral, Q8H PRN **OR** ondansetron (ZOFRAN) injection 4 mg, 4 mg, IntraVENous, Q6H PRN, Israel Soto MD    heparin (porcine) injection 5,000 Units, 5,000 Units, SubCUTAneous, Q8H, Israel Soto MD, 5,000 Units at 04/04/22 0831    0.45% sodium chloride infusion, 100 mL/hr, IntraVENous, CONTINUOUS, Israel Soto MD, Last Rate: 100 mL/hr at 04/04/22 0600, 100 mL/hr at 04/04/22 0600    piperacillin-tazobactam (ZOSYN) 3.375 g in 0.9% sodium chloride (MBP/ADV) 100 mL MBP, 3.375 g, IntraVENous, Q8H, Israel Soto MD, Last Rate: 25 mL/hr at 04/04/22 0830, 3.375 g at 04/04/22 0830    Vancomycin Pharmacy Dosing, , Other, Rx Dosing/Monitoring, Abril Sherwood MD    [START ON 4/5/2022] Vancomyin Random Level 4-5-22 at [facility], , Other, ONCE, Israel Soto MD    LAB DATA REVIEWED:    Recent Results (from the past 24 hour(s))   GLUCOSE, POC    Collection Time: 04/03/22 10:34 PM   Result Value Ref Range    Glucose (POC) 279 (H) 65 - 117 mg/dL    Performed by Apolonia Loaiza    CBC WITH AUTOMATED DIFF    Collection Time: 04/03/22 10:43 PM   Result Value Ref Range    WBC 16.9 (H) 3.6 - 11.0 K/uL    RBC 2.65 (L) 3.80 - 5.20 M/uL    HGB 7.3 (L) 11.5 - 16.0 g/dL    HCT 24.6 (L) 35.0 - 47.0 %    MCV 92.8 80.0 - 99.0 FL    MCH 27.5 26.0 - 34.0 PG    MCHC 29.7 (L) 30.0 - 36.5 g/dL    RDW 17.7 (H) 11.5 - 14.5 %    PLATELET 651 961 - 757 K/uL    MPV 11.5 8.9 - 12.9 FL    NRBC 0.0 0.0  WBC    ABSOLUTE NRBC 0. 00 0.00 - 0.01 K/uL    NEUTROPHILS 73 32 - 75 %    LYMPHOCYTES 18 12 - 49 %    MONOCYTES 7 5 - 13 %    EOSINOPHILS 0 0 - 7 %    BASOPHILS 1 0 - 1 %    IMMATURE GRANULOCYTES 1 (H) 0 - 0.5 %    ABS. NEUTROPHILS 12.5 (H) 1.8 - 8.0 K/UL    ABS. LYMPHOCYTES 3.0 0.8 - 3.5 K/UL    ABS. MONOCYTES 1.2 (H) 0.0 - 1.0 K/UL    ABS. EOSINOPHILS 0.1 0.0 - 0.4 K/UL    ABS. BASOPHILS 0.1 0.0 - 0.1 K/UL    ABS. IMM. GRANS. 0.1 (H) 0.00 - 0.04 K/UL    DF AUTOMATED     METABOLIC PANEL, COMPREHENSIVE    Collection Time: 04/03/22 10:43 PM   Result Value Ref Range    Sodium 144 136 - 145 mmol/L    Potassium 6.2 (H) 3.5 - 5.1 mmol/L    Chloride 114 (H) 97 - 108 mmol/L    CO2 25 21 - 32 mmol/L    Anion gap 5 5 - 15 mmol/L    Glucose 272 (H) 65 - 100 mg/dL     (H) 6 - 20 mg/dL    Creatinine 3.00 (H) 0.55 - 1.02 mg/dL    BUN/Creatinine ratio 35 (H) 12 - 20      GFR est AA 18 (L) >60 ml/min/1.73m2    GFR est non-AA 15 (L) >60 ml/min/1.73m2    Calcium 8.3 (L) 8.5 - 10.1 mg/dL    Bilirubin, total 0.4 0.2 - 1.0 mg/dL    AST (SGOT) 128 (H) 15 - 37 U/L    ALT (SGPT) 94 (H) 12 - 78 U/L    Alk.  phosphatase 133 (H) 45 - 117 U/L    Protein, total 7.4 6.4 - 8.2 g/dL    Albumin 2.0 (L) 3.5 - 5.0 g/dL    Globulin 5.4 (H) 2.0 - 4.0 g/dL    A-G Ratio 0.4 (L) 1.1 - 2.2     LACTIC ACID    Collection Time: 04/03/22 10:43 PM   Result Value Ref Range    Lactic acid 1.7 0.4 - 2.0 mmol/L   URINALYSIS W/ REFLEX CULTURE    Collection Time: 04/03/22 10:43 PM    Specimen: Urine   Result Value Ref Range    Color Yellow      Appearance Turbid (A) Clear      Specific gravity 1.014 1.003 - 1.030      pH (UA) 7.0 5.0 - 8.0      Protein 100 (A) Negative mg/dL    Glucose Negative Negative mg/dL    Ketone Negative Negative mg/dL    Bilirubin Negative Negative      Blood Large (A) Negative      Urobilinogen 0.1 0.1 - 1.0 EU/dL    Nitrites Negative Negative      Leukocyte Esterase Moderate (A) Negative      UA:UC IF INDICATED Urine Culture Ordered (A) Culture not indicated by UA result      WBC >100 (H) 0 - 4 /hpf    RBC >100 (H) 0 - 5 /hpf    Bacteria 1+ (A) Negative /hpf    PRESUMPTIVE YEAST Occasional      Other Urine Micro 7     VENOUS BLOOD GAS    Collection Time: 04/03/22 10:43 PM   Result Value Ref Range    VENOUS PH 7.422 (H) 7.31 - 7.41      VENOUS PCO2 39.5 (L) 40 - 50 mmHg    VENOUS PO2 163 (H) 36 - 42 mmHg    VENOUS O2 SATURATION 101 (H) 60 - 80 %    VENOUS BICARBONATE 26 22 - 26 mmol/L    VENOUS BASE EXCESS 1.2 0 - 2 mmol/L    O2 METHOD Room air      FIO2 21.0 %    SITE RV     COVID-19 RAPID TEST    Collection Time: 04/03/22 11:24 PM   Result Value Ref Range    COVID-19 rapid test Not Detected Not Detected     BLOOD GAS, ARTERIAL    Collection Time: 04/04/22  2:32 AM   Result Value Ref Range    pH 7.39 7.35 - 7.45      PCO2 39 35 - 45 mmHg    PO2 121 (H) 75 - 100 mmHg    O2  >95 %    BICARBONATE 23 22 - 26 mmol/L    BASE DEFICIT 1.5 0 - 2 mmol/L    O2 METHOD Nasal Cannula      O2 FLOW RATE 2 L/min    SITE Right Brachial      EVELIN'S TEST PASS     METABOLIC PANEL, BASIC    Collection Time: 04/04/22  2:54 AM   Result Value Ref Range    Sodium 147 (H) 136 - 145 mmol/L    Potassium 5.6 (H) 3.5 - 5.1 mmol/L    Chloride 120 (H) 97 - 108 mmol/L    CO2 23 21 - 32 mmol/L    Anion gap 4 (L) 5 - 15 mmol/L    Glucose 223 (H) 65 - 100 mg/dL    BUN 94 (H) 6 - 20 mg/dL    Creatinine 2.41 (H) 0.55 - 1.02 mg/dL    BUN/Creatinine ratio 39 (H) 12 - 20      GFR est AA 24 (L) >60 ml/min/1.73m2    GFR est non-AA 20 (L) >60 ml/min/1.73m2    Calcium 8.2 (L) 8.5 - 10.1 mg/dL   GLUCOSE, POC    Collection Time: 04/04/22  3:04 AM   Result Value Ref Range    Glucose (POC) 216 (H) 65 - 117 mg/dL    Performed by Ayo SINGLETON)    MRSA SCREEN - PCR (NASAL)    Collection Time: 04/04/22  3:45 AM   Result Value Ref Range    MRSA by PCR, Nasal Not Detected Not Detected     LACTIC ACID    Collection Time: 04/04/22  6:00 AM   Result Value Ref Range    Lactic acid 1.2 0.4 - 2.0 mmol/L GLUCOSE, POC    Collection Time: 04/04/22  8:22 AM   Result Value Ref Range    Glucose (POC) 256 (H) 65 - 117 mg/dL    Performed by Lucrecia Griffin        XR Results (most recent):  Results from Hospital Encounter encounter on 04/03/22    XR CHEST PORT    Narrative  Upright radiograph chest 10:46 p.m. compared to January 25, 2022. INDICATION: Altered mental status. Heart size is enlarged with an atherosclerotic aorta. No significant infiltrate  or effusion. Bones appear intact. Degenerative changes of the shoulders. Postfusion changes lower cervical spine. Impression  No acute findings. CT HEAD WO CONT   Final Result   Chronic changes which appear stable. No acute or active intracranial process. Chronic paranasal sinus disease         XR CHEST PORT   Final Result   No acute findings. Review of Systems:  Unable to obtain    Objective:   VITALS:    Visit Vitals  BP (!) 152/56 (BP 1 Location: Left upper arm, BP Patient Position: At rest)   Pulse 76   Temp 98.6 °F (37 °C)   Resp 20   Ht 5' 8\" (1.727 m)   Wt 80.9 kg (178 lb 5.6 oz)   SpO2 100%   BMI 27.12 kg/m²       Physical Exam:   Constitutional: Open eyes do not follow any,. HENT:   Head: Normocephalic and atraumatic. Eyes: Pupils are equal, round, and reactive to light. EOM are normal.   Cardiovascular: Normal rate, regular rhythm and normal heart sounds. Pulmonary/Chest: Breath sounds normal. No wheezes. No rales. Exhibits no tenderness. Abdominal: Soft. Bowel sounds are normal. There is no abdominal tenderness. There is no rebound and no guarding. Musculoskeletal: Normal range of motion.    Neurological: Open eyes do not follow any, right-sided weakness    ASSESSMENT & PLAN:    Severe sepsis with UTI  UTI  Acute on chronic kidney disease  Hyperkalemia  CVA with right-sided weakness  Anemia of chronic disease  Gangrene of the left great toe      Patient received IV fluid in the ER    After blood cultures urine cultures start on IV Zosyn and vancomycin  Start on sliding scale insulin  Aspirin 81 mg daily  Lipitor 20 mg daily  Alphagan 0.2% 3 times a day  Ferrous sulfate 325 mg twice a day  Heparin 5000 units subcu every 8 hours  Sliding-scale insulin  Half-normal saline at 100 cc/h    Pulmonary nephrology infectious disease and vascular surgical          ________________________________________________________________________    Signed: Rosy Siddiqui MD

## 2022-04-04 NOTE — CONSULTS
Consult Date: 4/4/2022    Consults Sepsis    Subjective   This is a 78year old female, diabetic with ASCVD and prior stroke with right hemiparesis, from SNF, brought to the ED by EMS because of mental status changes. She was febrile to 102.6 and tachycpneic with WBC 16,900. Urinalysis showed marked pyuria and bacteria. CXR showed no acute findings. CT Head showed chronic changes with no acute intracranial process and chronic paranasal sinus disease. Images reviewed by me. Blood and urine cultures sent and patient started on IV Vancomycin and Zosyn. ID has been consulted for this reason. Nephrology and Pulmonary also consulted. Patient lying in bed, awake but nonverbal. Unable to obtain any history from her.   Past Medical History:   Diagnosis Date    Diabetes mellitus ([address]     HTN (hypertension)     Hyperlipidemia     Neuropathy     PAD (peripheral artery disease) (HCC)     PCI    Sciatica       Past Surgical History:   Procedure Laterality Date    HX AMPUTATION Right     great toe    HX CERVICAL FUSION      HX OTHER SURGICAL Bilateral     Stent placement    HX OTHER SURGICAL      HX VASCULAR STENT Bilateral     2 in right 1 in left    HX VASCULAR STENT Bilateral      Family History   Problem Relation Age of Onset    Cancer Mother     Diabetes Mother     Hypertension Mother       Social History     Tobacco Use    Smoking status: Never Smoker    Smokeless tobacco: Never Used   Substance Use Topics    Alcohol use: Not Currently       Current Facility-Administered Medications   Medication Dose Route Frequency Provider Last Rate Last Admin    atorvastatin (LIPITOR) tablet 20 mg  20 mg Oral QHS Israel Soto MD   20 mg at 04/04/22 6372    ferrous sulfate tablet 325 mg  325 mg Oral BID Israel Soto MD   325 mg at 04/04/22 0831    latanoprost (XALATAN) 0.005 % ophthalmic solution 1 Drop  1 Drop Right Eye QPM Israel Soto MD        aspirin delayed-release tablet 81 mg  81 mg Oral DAILY Maranda Soto MD   81 mg at 04/04/22 0831    brimonidine (ALPHAGAN) 0.2 % ophthalmic solution 1 Drop  1 Drop Both Eyes TID Israel Soto MD   1 Drop at 04/04/22 0836    insulin lispro (HUMALOG) injection   SubCUTAneous AC&HS Monserrat Loco MD   7 Units at 04/04/22 0830    glucose chewable tablet 16 g  4 Tablet Oral PRN Maranda Soto MD        glucagon (GLUCAGEN) injection 1 mg  1 mg IntraMUSCular PRN Maranda Soto MD Brunetta Nipper acetaminophen (TYLENOL) tablet 650 mg  650 mg Oral Q6H PRN Maranda Soto MD        Or   Geoffrey Carmen acetaminophen (TYLENOL) suppository 650 mg  650 mg Rectal Q6H PRN Maranda Soto MD        polyethylene glycol (MIRALAX) packet 17 g  17 g Oral DAILY PRN Maranda Soto MD        ondansetron (ZOFRAN ODT) tablet 4 mg  4 mg Oral Q8H PRN Maranda Soto MD        Or    ondansetron [facility]) injection 4 mg  4 mg IntraVENous Q6H PRN Monserrat Loco MD        heparin (porcine) injection 5,000 Units  5,000 Units SubCUTAneous Levormalinda Burleson MD   5,000 Units at 04/04/22 0831    0.45% sodium chloride infusion  100 mL/hr IntraVENous CONTINUOUS Maranda Soto  mL/hr at 04/04/22 0600 100 mL/hr at 04/04/22 0600    piperacillin-tazobactam (ZOSYN) 3.375 g in 0.9% sodium chloride (MBP/ADV) 100 mL MBP  3.375 g IntraVENous Q8H Israel Soto MD 25 mL/hr at 04/04/22 0830 3.375 g at 04/04/22 0830    Vancomycin Pharmacy Dosing   Other Rx Dosing/Monitoring MD Geoffrey Garcia [START ON 4/5/2022] Vancomyin Random Level 4-5-22 at 4am   Other ONCE Maranda Soto MD            Review of Systems   Unable to perform ROS: Patient nonverbal       Objective     Vital signs for last 24 hours:  Visit Vitals  BP (!) 152/56 (BP 1 Location: Left upper arm, BP Patient Position: At rest)   Pulse 76   Temp 98.6 °F (37 °C)   Resp 20   Ht 5' 8\" (1.727 m)   Wt 178 lb 5.6 oz (80.9 kg)   SpO2 100%   BMI 27.12 kg/m²       Intake/Output this shift:  Current Shift: No intake/output data recorded. Last 3 Shifts: 04/02 1901 - 04/04 0700  In: 2510 [I.V.:2510]  Out: 500 [Urine:500]    Data Review:   Recent Results (from the past 24 hour(s))   GLUCOSE, POC    Collection Time: 04/03/22 10:34 PM   Result Value Ref Range    Glucose (POC) 279 (H) 65 - 117 mg/dL    Performed by Surjit Hernandez    CBC WITH AUTOMATED DIFF    Collection Time: 04/03/22 10:43 PM   Result Value Ref Range    WBC 16.9 (H) 3.6 - 11.0 K/uL    RBC 2.65 (L) 3.80 - 5.20 M/uL    HGB 7.3 (L) 11.5 - 16.0 g/dL    HCT 24.6 (L) 35.0 - 47.0 %    MCV 92.8 80.0 - 99.0 FL    MCH 27.5 26.0 - 34.0 PG    MCHC 29.7 (L) 30.0 - 36.5 g/dL    RDW 17.7 (H) 11.5 - 14.5 %    PLATELET 287 646 - 991 K/uL    MPV 11.5 8.9 - 12.9 FL    NRBC 0.0 0.0  WBC    ABSOLUTE NRBC 0.00 0.00 - 0.01 K/uL    NEUTROPHILS 73 32 - 75 %    LYMPHOCYTES 18 12 - 49 %    MONOCYTES 7 5 - 13 %    EOSINOPHILS 0 0 - 7 %    BASOPHILS 1 0 - 1 %    IMMATURE GRANULOCYTES 1 (H) 0 - 0.5 %    ABS. NEUTROPHILS 12.5 (H) 1.8 - 8.0 K/UL    ABS. LYMPHOCYTES 3.0 0.8 - 3.5 K/UL    ABS. MONOCYTES 1.2 (H) 0.0 - 1.0 K/UL    ABS. EOSINOPHILS 0.1 0.0 - 0.4 K/UL    ABS. BASOPHILS 0.1 0.0 - 0.1 K/UL    ABS. IMM. GRANS. 0.1 (H) 0.00 - 0.04 K/UL    DF AUTOMATED     METABOLIC PANEL, COMPREHENSIVE    Collection Time: 04/03/22 10:43 PM   Result Value Ref Range    Sodium 144 136 - 145 mmol/L    Potassium 6.2 (H) 3.5 - 5.1 mmol/L    Chloride 114 (H) 97 - 108 mmol/L    CO2 25 21 - 32 mmol/L    Anion gap 5 5 - 15 mmol/L    Glucose 272 (H) 65 - 100 mg/dL     (H) 6 - 20 mg/dL    Creatinine 3.00 (H) 0.55 - 1.02 mg/dL    BUN/Creatinine ratio 35 (H) 12 - 20      GFR est AA 18 (L) >60 ml/min/1.73m2    GFR est non-AA 15 (L) >60 ml/min/1.73m2    Calcium 8.3 (L) 8.5 - 10.1 mg/dL    Bilirubin, total 0.4 0.2 - 1.0 mg/dL    AST (SGOT) 128 (H) 15 - 37 U/L    ALT (SGPT) 94 (H) 12 - 78 U/L    Alk.  phosphatase 133 (H) 45 - 117 U/L    Protein, total 7.4 6.4 - 8.2 g/dL    Albumin 2.0 (L) 3.5 - 5.0 g/dL Globulin 5.4 (H) 2.0 - 4.0 g/dL    A-G Ratio 0.4 (L) 1.1 - 2.2     LACTIC ACID    Collection Time: 04/03/22 10:43 PM   Result Value Ref Range    Lactic acid 1.7 0.4 - 2.0 mmol/L   URINALYSIS W/ REFLEX CULTURE    Collection Time: 04/03/22 10:43 PM    Specimen: Urine   Result Value Ref Range    Color Yellow      Appearance Turbid (A) Clear      Specific gravity 1.014 1.003 - 1.030      pH (UA) 7.0 5.0 - 8.0      Protein 100 (A) Negative mg/dL    Glucose Negative Negative mg/dL    Ketone Negative Negative mg/dL    Bilirubin Negative Negative      Blood Large (A) Negative      Urobilinogen 0.1 0.1 - 1.0 EU/dL    Nitrites Negative Negative      Leukocyte Esterase Moderate (A) Negative      UA:UC IF INDICATED Urine Culture Ordered (A) Culture not indicated by UA result      WBC >100 (H) 0 - 4 /hpf    RBC >100 (H) 0 - 5 /hpf    Bacteria 1+ (A) Negative /hpf    PRESUMPTIVE YEAST Occasional      Other Urine Micro 7     VENOUS BLOOD GAS    Collection Time: 04/03/22 10:43 PM   Result Value Ref Range    VENOUS PH 7.422 (H) 7.31 - 7.41      VENOUS PCO2 39.5 (L) 40 - 50 mmHg    VENOUS PO2 163 (H) 36 - 42 mmHg    VENOUS O2 SATURATION 101 (H) 60 - 80 %    VENOUS BICARBONATE 26 22 - 26 mmol/L    VENOUS BASE EXCESS 1.2 0 - 2 mmol/L    O2 METHOD Room air      FIO2 21.0 %    SITE RV     COVID-19 RAPID TEST    Collection Time: 04/03/22 11:24 PM   Result Value Ref Range    COVID-19 rapid test Not Detected Not Detected     BLOOD GAS, ARTERIAL    Collection Time: 04/04/22  2:32 AM   Result Value Ref Range    pH 7.39 7.35 - 7.45      PCO2 39 35 - 45 mmHg    PO2 121 (H) 75 - 100 mmHg    O2  >95 %    BICARBONATE 23 22 - 26 mmol/L    BASE DEFICIT 1.5 0 - 2 mmol/L    O2 METHOD Nasal Cannula      O2 FLOW RATE 2 L/min    SITE Right Brachial      EVELIN'S TEST PASS     METABOLIC PANEL, BASIC    Collection Time: 04/04/22  2:54 AM   Result Value Ref Range    Sodium 147 (H) 136 - 145 mmol/L    Potassium 5.6 (H) 3.5 - 5.1 mmol/L    Chloride 120 (H) 97 - 108 mmol/L    CO2 23 21 - 32 mmol/L    Anion gap 4 (L) 5 - 15 mmol/L    Glucose 223 (H) 65 - 100 mg/dL    BUN 94 (H) 6 - 20 mg/dL    Creatinine 2.41 (H) 0.55 - 1.02 mg/dL    BUN/Creatinine ratio 39 (H) 12 - 20      GFR est AA 24 (L) >60 ml/min/1.73m2    GFR est non-AA 20 (L) >60 ml/min/1.73m2    Calcium 8.2 (L) 8.5 - 10.1 mg/dL   GLUCOSE, POC    Collection Time: 04/04/22  3:04 AM   Result Value Ref Range    Glucose (POC) 216 (H) 65 - 117 mg/dL    Performed by Joleen Rubio (RN)    MRSA SCREEN - PCR (NASAL)    Collection Time: 04/04/22  3:45 AM   Result Value Ref Range    MRSA by PCR, Nasal Not Detected Not Detected     LACTIC ACID    Collection Time: 04/04/22  6:00 AM   Result Value Ref Range    Lactic acid 1.2 0.4 - 2.0 mmol/L   GLUCOSE, POC    Collection Time: 04/04/22  8:22 AM   Result Value Ref Range    Glucose (POC) 256 (H) 65 - 117 mg/dL    Performed by Fidel Severance      CT Head (4/4)        Physical Exam  Vitals and nursing note reviewed. Constitutional:       General: She is not in acute distress. Appearance: She is ill-appearing. HENT:      Head: Normocephalic and atraumatic. Right Ear: External ear normal.      Left Ear: External ear normal.      Nose: Nose normal.      Mouth/Throat:      Mouth: Mucous membranes are dry. Comments: Poor dentition  Eyes:      Pupils: Pupils are equal, round, and reactive to light. Cardiovascular:      Rate and Rhythm: Normal rate and regular rhythm. Heart sounds: No murmur heard. Pulmonary:      Effort: Pulmonary effort is normal.      Breath sounds: Normal breath sounds. Abdominal:      General: Bowel sounds are normal.      Palpations: Abdomen is soft. Tenderness: There is no abdominal tenderness. Comments: PEG tube in place, site unremarkable   Genitourinary:     Comments: Bell catheter  Musculoskeletal:      Cervical back: Neck supple. Right lower leg: No edema. Left lower leg: No edema.       Comments: Left great toe with dry gangrene, dark and shrunken   Skin:     Findings: No rash. Neurological:      Mental Status: She is alert. Comments: Unable to assess   Psychiatric:      Comments: Unable to assess       ASSESSMENT/PLAN    1. Sepsis with fever and leukocytosis  2. Probable UTI with marked pyuria and bacteriuria  3. Altered mentals status, multifactorial including sepsis  4. Uncontrolled diabetes mellitus with hyperglycemia  5. Chronic maxillary sinusitis  6. ASCVD with prior stroke  7. Hepatitis C antibody positive, HCV RNA negative  8. PAD with dry gangrene left great toe    Comment: This appears to be most likely urosepsis from Gram negative rods, and therefore, reasonable to discontinue Vancomycin for now. Renal ultrasound in Feb 2022 showed no stones or obstruction. There is no record of prior UTIs with multi-drug resistant organisms. 1. Continue IV Zosyn  2. Discontinue Vancomycin for now  3. Follow-up blood and urine cultures  4. In am, repeat CBC, check procal and CRP    Bruce Funes MD

## 2022-04-04 NOTE — ED TRIAGE NOTES
EMS reports AMS increasing over past two days. Pt hx of CVA with right sided weakness but is verbal. Pt not responding to painful stimuli. Temp 103, .

## 2022-04-04 NOTE — PROGRESS NOTES
Reason for Readmission:  Sepsis, UTI           RUR Score/Risk Level: 22%        PCP: First and Last name:  Patrick Agudelo   Name of Practice:    Are you a current patient: Yes/No: yes   Approximate date of last visit: unknown - has been hospitalized for  a while and then rehab   Can you participate in a virtual visit with your PCP: no    Is a Care Conference indicated: not at this time       Did you attend your follow up appointment (s): If not, why not:  Discharged to [facility] in [place]       Resources/supports as identified by patient/family:     Strong family support through children     Top Challenges facing patient (as identified by patient/family and CM): Finances/Medication cost?  No issues     Transportation   No issues     Support system or lack thereof? No issues      Living arrangements? No issues         Self-care/ADLs/Cognition? No issues           Current Advanced Directive/Advance Care Plan: Full Code  Primary Healthcare Decision Maker: Layne Thomas  (daughter) [phone]             Plan for utilizing home health:  Plan for SNF at this time              Transition of Care Plan: Based on readmission, the patient's previous Plan of Care has been evaluated and/or modified. The current Transition of Care Plan is:  CM met with the patient and her children at the bedside to complete assessment. Patient was previously hospitalized here but transferred to another hospital. Family reports she was discharged one week ago and went to [facility] in [place] for rehab. They do not think they want he to return upon discharge and are requesting a referral to [facility]. Choice obtained and referral has been sent.

## 2022-04-04 NOTE — ED NOTES
2315 - assumed care of pt at this time;;    ED visit d/t Acute AMS  From baseline - onset of sxs, worsening over the last 2 days - family members reports pt with increasing somnolent / lethargic mentation - normally more responsiveness and baseline, is verbal - on arrival pt is minimally responsive / little to none responsive to  painful stimuli - EMS blood glucose noted to be 352. 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 .. affecting both side but more on the right side \" ;; G tube, placed within the last \" couple of months \" ;;    2319 - pt's family member at bedside for comfort and care;;    Pt's family reports pt with worsening mentation for the last 2 days while the SNF;;    0040 - Oumou Best MD at bedside - spoke with POA and family members regarding plan of care and results;;     Pt spontaneously opening eyes at this time - remains non verbal - improved responsiveness to surroundings;;

## 2022-04-04 NOTE — ED NOTES
Bedside and Verbal shift change report given to Trinh Dixon RN (oncoming nurse) by Jose Luis Locke RN (offgoing nurse). Report included the following information SBAR, ED Summary and MAR.

## 2022-04-04 NOTE — PROGRESS NOTES
Spoke with patient's daughter at bedside. Introduced myself and [address] Daughter very welcoming and asked us about patient's condition. Explained to her that patient was admitted for UTI and is receiving zosyn antibiotic. She will also start tube feeding as soon as it is sent up from [facility]. Daughter asked what insulin she is taking. Explained she is receiving humalog for coverage if needed when blood sugar is checked. Daughter also asked if she was septic. Checked Lactic Acid level and it was 1.2. Explained the significance of a normal lactic acid and it's correlation with sepsis. Daughter thanked us for taking time to talk to her and stated she felt better about the plan of care. Reassured the daughter that we are here to take the best care of her mother and for her to ask for anything they need.     Signed By: Jorden Barrios RN     April 4, 2022

## 2022-04-04 NOTE — CONSULTS
Consult Date: 4/4/2022    IP CONSULT TO NEPHROLOGY  Consult performed by: Hetal Aleman MD  Consult ordered by: Nell Ahmadi MD    IP CONSULT TO NEPHROLOGY  Consult performed by: Hetal Aleman MD  Consult ordered by: Rell Samuel MD    IP CONSULT TO NEPHROLOGY  Consult performed by: Hetal Aleman MD  Consult ordered by: Rell Samuel MD          Subjective   HISTORY OF PRESENTING ILLNESS   Patient is a 47-year-old -American female with a history of CVA, diabetes type 2 with complications, hypertension, dyslipidemia, chronic kidney disease who was hospitalized because of fever and altered mental status as noticed at SNF. Apparently she is not able to follow any commands, but remains with her eyes open. Lab testing showed hyperkalemia and elevated creatinine. During previous hospitalization, she was seen by us for acute kidney injury and hematuria. Patient is not able to respond.   She was receiving IV fluids in the ICU  Past Medical History:   Diagnosis Date    Diabetes mellitus ([address]     HTN (hypertension)     Hyperlipidemia     Neuropathy     PAD (peripheral artery disease) (HCC)     PCI    Sciatica       Past Surgical History:   Procedure Laterality Date    HX AMPUTATION Right     great toe    HX CERVICAL FUSION      HX OTHER SURGICAL Bilateral     Stent placement    HX OTHER SURGICAL      HX VASCULAR STENT Bilateral     2 in right 1 in left    HX VASCULAR STENT Bilateral      Family History   Problem Relation Age of Onset    Cancer Mother     Diabetes Mother     Hypertension Mother       Social History     Tobacco Use    Smoking status: Never Smoker    Smokeless tobacco: Never Used   Substance Use Topics    Alcohol use: Not Currently       Current Facility-Administered Medications   Medication Dose Route Frequency Provider Last Rate Last Admin    atorvastatin (LIPITOR) tablet 20 mg  20 mg Oral QHS Israel Soto MD   20 mg at 04/04/22 0831    ferrous sulfate tablet 325 mg  325 mg Oral BID Israel Soto MD   325 mg at 04/04/22 0831    latanoprost (XALATAN) 0.005 % ophthalmic solution 1 Drop  1 Drop Right Eye QPM Jaren Frances MD        aspirin delayed-release tablet 81 mg  81 mg Oral DAILY Israel Soto MD   81 mg at 04/04/22 0831    brimonidine (ALPHAGAN) 0.2 % ophthalmic solution 1 Drop  1 Drop Both Eyes TID Jaren Frances MD   1 Drop at 04/04/22 0836    insulin lispro (HUMALOG) injection   SubCUTAneous AC&HS Jaren Frances MD   4 Units at 04/04/22 1137    glucose chewable tablet 16 g  4 Tablet Oral PRN Adria Soto MD        glucagon (GLUCAGEN) injection 1 mg  1 mg IntraMUSCular PRN Adria Soto MD       [address] [place] [address] acetaminophen (TYLENOL) tablet 650 mg  650 mg Oral Q6H PRN Adria Soto MD        Or   [address] [place] [address] acetaminophen (TYLENOL) suppository 650 mg  650 mg Rectal Q6H PRN Adria Soto MD        polyethylene glycol (MIRALAX) packet 17 g  17 g Oral DAILY PRN Adria Soto MD        ondansetron (ZOFRAN ODT) tablet 4 mg  4 mg Oral Q8H PRN Adria Soto MD        Or    ondansetron [facility]) injection 4 mg  4 mg IntraVENous Q6H PRN Jaren Frances MD        heparin (porcine) injection 5,000 Units  5,000 Units SubCUTAneous Rebel Sanchez MD   5,000 Units at 04/04/22 0831    0.45% sodium chloride infusion  100 mL/hr IntraVENous CONTINUOUS Israel Soto  mL/hr at 04/04/22 0600 100 mL/hr at 04/04/22 0600    piperacillin-tazobactam (ZOSYN) 3.375 g in 0.9% sodium chloride (MBP/ADV) 100 mL MBP  3.375 g IntraVENous Q8H Israel Soto MD 25 mL/hr at 04/04/22 0830 3.375 g at 04/04/22 0830    Vancomycin Pharmacy Dosing   Other Rx Dosing/Monitoring Jaren Frances MD       [address] [place] [address] [START ON 4/5/2022] Vancomyin Random Level 4-5-22 at 4am   Other Lorry Kehr, MD            Review of Systems   Unable to perform ROS: Patient nonverbal       Objective     Vital signs for last 24 hours:  Visit Vitals  BP (!) 124/50   Pulse 72   Temp 97.9 °F (36.6 °C)   Resp 20   Ht 5' 7.99\" (1.727 m)   Wt 80.9 kg (178 lb 5.6 oz)   SpO2 96%   BMI 27.12 kg/m²           Recent Results (from the past 24 hour(s))   GLUCOSE, POC    Collection Time: 04/03/22 10:34 PM   Result Value Ref Range    Glucose (POC) 279 (H) 65 - 117 mg/dL    Performed by Kelsi Salazar    CBC WITH AUTOMATED DIFF    Collection Time: 04/03/22 10:43 PM   Result Value Ref Range    WBC 16.9 (H) 3.6 - 11.0 K/uL    RBC 2.65 (L) 3.80 - 5.20 M/uL    HGB 7.3 (L) 11.5 - 16.0 g/dL    HCT 24.6 (L) 35.0 - 47.0 %    MCV 92.8 80.0 - 99.0 FL    MCH 27.5 26.0 - 34.0 PG    MCHC 29.7 (L) 30.0 - 36.5 g/dL    RDW 17.7 (H) 11.5 - 14.5 %    PLATELET 485 503 - 142 K/uL    MPV 11.5 8.9 - 12.9 FL    NRBC 0.0 0.0  WBC    ABSOLUTE NRBC 0.00 0.00 - 0.01 K/uL    NEUTROPHILS 73 32 - 75 %    LYMPHOCYTES 18 12 - 49 %    MONOCYTES 7 5 - 13 %    EOSINOPHILS 0 0 - 7 %    BASOPHILS 1 0 - 1 %    IMMATURE GRANULOCYTES 1 (H) 0 - 0.5 %    ABS. NEUTROPHILS 12.5 (H) 1.8 - 8.0 K/UL    ABS. LYMPHOCYTES 3.0 0.8 - 3.5 K/UL    ABS. MONOCYTES 1.2 (H) 0.0 - 1.0 K/UL    ABS. EOSINOPHILS 0.1 0.0 - 0.4 K/UL    ABS. BASOPHILS 0.1 0.0 - 0.1 K/UL    ABS. IMM. GRANS. 0.1 (H) 0.00 - 0.04 K/UL    DF AUTOMATED     METABOLIC PANEL, COMPREHENSIVE    Collection Time: 04/03/22 10:43 PM   Result Value Ref Range    Sodium 144 136 - 145 mmol/L    Potassium 6.2 (H) 3.5 - 5.1 mmol/L    Chloride 114 (H) 97 - 108 mmol/L    CO2 25 21 - 32 mmol/L    Anion gap 5 5 - 15 mmol/L    Glucose 272 (H) 65 - 100 mg/dL     (H) 6 - 20 mg/dL    Creatinine 3.00 (H) 0.55 - 1.02 mg/dL    BUN/Creatinine ratio 35 (H) 12 - 20      GFR est AA 18 (L) >60 ml/min/1.73m2    GFR est non-AA 15 (L) >60 ml/min/1.73m2    Calcium 8.3 (L) 8.5 - 10.1 mg/dL    Bilirubin, total 0.4 0.2 - 1.0 mg/dL    AST (SGOT) 128 (H) 15 - 37 U/L    ALT (SGPT) 94 (H) 12 - 78 U/L    Alk.  phosphatase 133 (H) 45 - 117 U/L    Protein, total 7.4 6.4 - 8.2 g/dL    Albumin 2.0 (L) 3.5 - 5.0 g/dL    Globulin 5.4 (H) 2.0 - 4.0 g/dL    A-G Ratio 0.4 (L) 1.1 - 2.2     LACTIC ACID    Collection Time: 04/03/22 10:43 PM   Result Value Ref Range    Lactic acid 1.7 0.4 - 2.0 mmol/L   CULTURE, BLOOD, PAIRED    Collection Time: 04/03/22 10:43 PM    Specimen: Blood   Result Value Ref Range    Special Requests: No Special Requests      Culture result: No growth after 3 hours     URINALYSIS W/ REFLEX CULTURE    Collection Time: 04/03/22 10:43 PM    Specimen: Urine   Result Value Ref Range    Color Yellow      Appearance Turbid (A) Clear      Specific gravity 1.014 1.003 - 1.030      pH (UA) 7.0 5.0 - 8.0      Protein 100 (A) Negative mg/dL    Glucose Negative Negative mg/dL    Ketone Negative Negative mg/dL    Bilirubin Negative Negative      Blood Large (A) Negative      Urobilinogen 0.1 0.1 - 1.0 EU/dL    Nitrites Negative Negative      Leukocyte Esterase Moderate (A) Negative      UA:UC IF INDICATED Urine Culture Ordered (A) Culture not indicated by UA result      WBC >100 (H) 0 - 4 /hpf    RBC >100 (H) 0 - 5 /hpf    Bacteria 1+ (A) Negative /hpf    PRESUMPTIVE YEAST Occasional      Other Urine Micro 7     VENOUS BLOOD GAS    Collection Time: 04/03/22 10:43 PM   Result Value Ref Range    VENOUS PH 7.422 (H) 7.31 - 7.41      VENOUS PCO2 39.5 (L) 40 - 50 mmHg    VENOUS PO2 163 (H) 36 - 42 mmHg    VENOUS O2 SATURATION 101 (H) 60 - 80 %    VENOUS BICARBONATE 26 22 - 26 mmol/L    VENOUS BASE EXCESS 1.2 0 - 2 mmol/L    O2 METHOD Room air      FIO2 21.0 %    SITE RV     COVID-19 RAPID TEST    Collection Time: 04/03/22 11:24 PM   Result Value Ref Range    COVID-19 rapid test Not Detected Not Detected     BLOOD GAS, ARTERIAL    Collection Time: 04/04/22  2:32 AM   Result Value Ref Range    pH 7.39 7.35 - 7.45      PCO2 39 35 - 45 mmHg    PO2 121 (H) 75 - 100 mmHg    O2  >95 %    BICARBONATE 23 22 - 26 mmol/L    BASE DEFICIT 1.5 0 - 2 mmol/L    O2 METHOD Nasal Cannula      O2 FLOW RATE 2 L/min    SITE Right Brachial      EVELIN'S TEST PASS     METABOLIC PANEL, BASIC    Collection Time: 04/04/22  2:54 AM   Result Value Ref Range    Sodium 147 (H) 136 - 145 mmol/L    Potassium 5.6 (H) 3.5 - 5.1 mmol/L    Chloride 120 (H) 97 - 108 mmol/L    CO2 23 21 - 32 mmol/L    Anion gap 4 (L) 5 - 15 mmol/L    Glucose 223 (H) 65 - 100 mg/dL    BUN 94 (H) 6 - 20 mg/dL    Creatinine 2.41 (H) 0.55 - 1.02 mg/dL    BUN/Creatinine ratio 39 (H) 12 - 20      GFR est AA 24 (L) >60 ml/min/1.73m2    GFR est non-AA 20 (L) >60 ml/min/1.73m2    Calcium 8.2 (L) 8.5 - 10.1 mg/dL   GLUCOSE, POC    Collection Time: 04/04/22  3:04 AM   Result Value Ref Range    Glucose (POC) 216 (H) 65 - 117 mg/dL    Performed by Darcy Franco (RN)    MRSA SCREEN - PCR (NASAL)    Collection Time: 04/04/22  3:45 AM   Result Value Ref Range    MRSA by PCR, Nasal Not Detected Not Detected     HEMOGLOBIN A1C WITH EAG    Collection Time: 04/04/22  6:00 AM   Result Value Ref Range    Hemoglobin A1c 6.6 (H) 4.0 - 5.6 %    Est. average glucose 143 mg/dL   LACTIC ACID    Collection Time: 04/04/22  6:00 AM   Result Value Ref Range    Lactic acid 1.2 0.4 - 2.0 mmol/L   GLUCOSE, POC    Collection Time: 04/04/22  8:22 AM   Result Value Ref Range    Glucose (POC) 256 (H) 65 - 117 mg/dL    Performed by CHRIS METCALF    GLUCOSE, POC    Collection Time: 04/04/22 11:24 AM   Result Value Ref Range    Glucose (POC) 227 (H) 65 - 117 mg/dL    Performed by Chyrl Holter           Intake/Output Summary (Last 24 hours) at 4/4/2022 1322  Last data filed at 4/4/2022 1024  Gross per 24 hour   Intake 2510 ml   Output 900 ml   Net 1610 ml      Current Shift: 04/04 0701 - 04/04 1900  In: -   Out: 400 [Urine:400]  Last 3 Shifts: 04/02 1901 - 04/04 0700  In: 2510 [I.V.:2510]  Out: 500 [Urine:500]  Physical Exam  Constitutional:       Appearance: She is ill-appearing. HENT:      Head: Normocephalic and atraumatic.       Nose: Nose normal.   Cardiovascular:      Rate and Rhythm: Tachycardia present. Pulses: Normal pulses. Abdominal:      General: Abdomen is flat. Skin:     General: Skin is warm and dry. Her face appears swollen  Data Review:   Recent Results (from the past 24 hour(s))   GLUCOSE, POC    Collection Time: 04/03/22 10:34 PM   Result Value Ref Range    Glucose (POC) 279 (H) 65 - 117 mg/dL    Performed by Safia Valentine    CBC WITH AUTOMATED DIFF    Collection Time: 04/03/22 10:43 PM   Result Value Ref Range    WBC 16.9 (H) 3.6 - 11.0 K/uL    RBC 2.65 (L) 3.80 - 5.20 M/uL    HGB 7.3 (L) 11.5 - 16.0 g/dL    HCT 24.6 (L) 35.0 - 47.0 %    MCV 92.8 80.0 - 99.0 FL    MCH 27.5 26.0 - 34.0 PG    MCHC 29.7 (L) 30.0 - 36.5 g/dL    RDW 17.7 (H) 11.5 - 14.5 %    PLATELET 217 482 - 219 K/uL    MPV 11.5 8.9 - 12.9 FL    NRBC 0.0 0.0  WBC    ABSOLUTE NRBC 0.00 0.00 - 0.01 K/uL    NEUTROPHILS 73 32 - 75 %    LYMPHOCYTES 18 12 - 49 %    MONOCYTES 7 5 - 13 %    EOSINOPHILS 0 0 - 7 %    BASOPHILS 1 0 - 1 %    IMMATURE GRANULOCYTES 1 (H) 0 - 0.5 %    ABS. NEUTROPHILS 12.5 (H) 1.8 - 8.0 K/UL    ABS. LYMPHOCYTES 3.0 0.8 - 3.5 K/UL    ABS. MONOCYTES 1.2 (H) 0.0 - 1.0 K/UL    ABS. EOSINOPHILS 0.1 0.0 - 0.4 K/UL    ABS. BASOPHILS 0.1 0.0 - 0.1 K/UL    ABS. IMM. GRANS. 0.1 (H) 0.00 - 0.04 K/UL    DF AUTOMATED     METABOLIC PANEL, COMPREHENSIVE    Collection Time: 04/03/22 10:43 PM   Result Value Ref Range    Sodium 144 136 - 145 mmol/L    Potassium 6.2 (H) 3.5 - 5.1 mmol/L    Chloride 114 (H) 97 - 108 mmol/L    CO2 25 21 - 32 mmol/L    Anion gap 5 5 - 15 mmol/L    Glucose 272 (H) 65 - 100 mg/dL     (H) 6 - 20 mg/dL    Creatinine 3.00 (H) 0.55 - 1.02 mg/dL    BUN/Creatinine ratio 35 (H) 12 - 20      GFR est AA 18 (L) >60 ml/min/1.73m2    GFR est non-AA 15 (L) >60 ml/min/1.73m2    Calcium 8.3 (L) 8.5 - 10.1 mg/dL    Bilirubin, total 0.4 0.2 - 1.0 mg/dL    AST (SGOT) 128 (H) 15 - 37 U/L    ALT (SGPT) 94 (H) 12 - 78 U/L    Alk.  phosphatase 133 (H) 45 - 117 U/L    Protein, total 7.4 6.4 - 8.2 g/dL    Albumin 2.0 (L) 3.5 - 5.0 g/dL    Globulin 5.4 (H) 2.0 - 4.0 g/dL    A-G Ratio 0.4 (L) 1.1 - 2.2     LACTIC ACID    Collection Time: 04/03/22 10:43 PM   Result Value Ref Range    Lactic acid 1.7 0.4 - 2.0 mmol/L   CULTURE, BLOOD, PAIRED    Collection Time: 04/03/22 10:43 PM    Specimen: Blood   Result Value Ref Range    Special Requests: No Special Requests      Culture result: No growth after 3 hours     URINALYSIS W/ REFLEX CULTURE    Collection Time: 04/03/22 10:43 PM    Specimen: Urine   Result Value Ref Range    Color Yellow      Appearance Turbid (A) Clear      Specific gravity 1.014 1.003 - 1.030      pH (UA) 7.0 5.0 - 8.0      Protein 100 (A) Negative mg/dL    Glucose Negative Negative mg/dL    Ketone Negative Negative mg/dL    Bilirubin Negative Negative      Blood Large (A) Negative      Urobilinogen 0.1 0.1 - 1.0 EU/dL    Nitrites Negative Negative      Leukocyte Esterase Moderate (A) Negative      UA:UC IF INDICATED Urine Culture Ordered (A) Culture not indicated by UA result      WBC >100 (H) 0 - 4 /hpf    RBC >100 (H) 0 - 5 /hpf    Bacteria 1+ (A) Negative /hpf    PRESUMPTIVE YEAST Occasional      Other Urine Micro 7     VENOUS BLOOD GAS    Collection Time: 04/03/22 10:43 PM   Result Value Ref Range    VENOUS PH 7.422 (H) 7.31 - 7.41      VENOUS PCO2 39.5 (L) 40 - 50 mmHg    VENOUS PO2 163 (H) 36 - 42 mmHg    VENOUS O2 SATURATION 101 (H) 60 - 80 %    VENOUS BICARBONATE 26 22 - 26 mmol/L    VENOUS BASE EXCESS 1.2 0 - 2 mmol/L    O2 METHOD Room air      FIO2 21.0 %    SITE RV     COVID-19 RAPID TEST    Collection Time: 04/03/22 11:24 PM   Result Value Ref Range    COVID-19 rapid test Not Detected Not Detected     BLOOD GAS, ARTERIAL    Collection Time: 04/04/22  2:32 AM   Result Value Ref Range    pH 7.39 7.35 - 7.45      PCO2 39 35 - 45 mmHg    PO2 121 (H) 75 - 100 mmHg    O2  >95 %    BICARBONATE 23 22 - 26 mmol/L    BASE DEFICIT 1.5 0 - 2 mmol/L    O2 METHOD Nasal Cannula      O2 FLOW RATE 2 L/min    SITE Right Brachial      EVELIN'S TEST PASS     METABOLIC PANEL, BASIC    Collection Time: 04/04/22  2:54 AM   Result Value Ref Range    Sodium 147 (H) 136 - 145 mmol/L    Potassium 5.6 (H) 3.5 - 5.1 mmol/L    Chloride 120 (H) 97 - 108 mmol/L    CO2 23 21 - 32 mmol/L    Anion gap 4 (L) 5 - 15 mmol/L    Glucose 223 (H) 65 - 100 mg/dL    BUN 94 (H) 6 - 20 mg/dL    Creatinine 2.41 (H) 0.55 - 1.02 mg/dL    BUN/Creatinine ratio 39 (H) 12 - 20      GFR est AA 24 (L) >60 ml/min/1.73m2    GFR est non-AA 20 (L) >60 ml/min/1.73m2    Calcium 8.2 (L) 8.5 - 10.1 mg/dL   GLUCOSE, POC    Collection Time: 04/04/22  3:04 AM   Result Value Ref Range    Glucose (POC) 216 (H) 65 - 117 mg/dL    Performed by Layo SINGLETON)    MRSA SCREEN - PCR (NASAL)    Collection Time: 04/04/22  3:45 AM   Result Value Ref Range    MRSA by PCR, Nasal Not Detected Not Detected     HEMOGLOBIN A1C WITH EAG    Collection Time: 04/04/22  6:00 AM   Result Value Ref Range    Hemoglobin A1c 6.6 (H) 4.0 - 5.6 %    Est. average glucose 143 mg/dL   LACTIC ACID    Collection Time: 04/04/22  6:00 AM   Result Value Ref Range    Lactic acid 1.2 0.4 - 2.0 mmol/L   GLUCOSE, POC    Collection Time: 04/04/22  8:22 AM   Result Value Ref Range    Glucose (POC) 256 (H) 65 - 117 mg/dL    Performed by Tiara Julian, POC    Collection Time: 04/04/22 11:24 AM   Result Value Ref Range    Glucose (POC) 227 (H) 65 - 117 mg/dL    Performed by Lima Julian          CT HEAD WO CONT   Final Result   Chronic changes which appear stable. No acute or active intracranial process. Chronic paranasal sinus disease         XR CHEST PORT   Final Result   No acute findings.            Patient Active Problem List   Diagnosis Code    HTN (hypertension) I10    Hyperlipidemia E78.5    Neuropathy G62.9    Sciatica M54.30    Diabetes mellitus with neurological manifestations, uncontrolled [id number]    Infection of nailbed of toe of left foot L03.032    PAD [facility]peripheral artery disease) ([facility]) I73.9    Hypercalcemia E83.52    DM type 2 causing vascular disease ([facility] E11.59    Type 2 diabetes mellitus with nephropathy ([facility] E11.21    Acute osteomyelitis of ankle or foot ([facility] M86.179    [facility]abetic peripheral neuropathy [facility] E11.42    Spinal stenosis in cervical region M48.02    Ischemia of both lower extremities I99.8    Pain in both lower legs M79.661, M79.662    CVA (cerebral vascular accident) ([facility] I63.9    Elevated troponin R77.8    Hyperkalemia E87.5    UTI (urinary tract infection) N39.0    Sepsis ([facility]) A41.9    RICARDO (acute kidney injury) ([facility] N17.9    AMS (altered mental status) R41.82        DIAGNOSES:  1. Hyperkalemia, severe-improved now  2. UTI/sepsis on Zosyn and vancomycin  3. Gangrene of left great toe  4. Acute kidney injury on chronic kidney disease  5. Encephalopathy  6. History of CVA  7. Diabetes mellitus with complications  8. Peripheral arterial disease  DISCUSSION:   Agree with treatment for sepsis.  Concerned about gangrene in the toe with no clear demarcation line-suspicious of spreading gangrene    PLAN:   Hyperkalemia was successfully treated   Would continue with IV fluids @ this time.  Monitor renal panel daily        Thanks for consulting me. Please don't hesitate to contact me if any questions arise of if I can assist in any manner. This dictation was done by dragon, computer voice recognition software. Often unanticipated grammatical, syntax, phones and other interpretive errors are inadvertently transcribed. Please excuse errors that have escaped final proofreading. Please contact me if you suspect dictation or transcription errors.   Dr Meenu Barrow  [address], [address] [facility], [address] [facility]  Cell Phone: [phone]  Office phone: [phone]  Fax: [phone]

## 2022-04-04 NOTE — PROGRESS NOTES
Rounded on patient. Turned on left side. Placed pillow between legs. Heel boots on. Patient resting comfortably.

## 2022-04-04 NOTE — PROGRESS NOTES
Patient arrive to   Rm # [id number] at 10:35am. Patient situate, turn on Right side , orient to room and staff. IV fluids running @100ml hr. No Family currently at bedside. Call bell within reach with no other concerns noted at this time.

## 2022-04-04 NOTE — PROGRESS NOTES
04/04/22 0530  IP CONSULT TO PHARMACY - VANCOMYCIN DOSING  ONE TIME     Complete     Comments: Pharmacy will order the necessary lab work, if needed, to complete the dosing and ongoing monitoring of requested drug therapy. Details will be updated within the patients Progress Notes. Ordering Provider: Niyah Chaudhari MD   Authorizing Provider: Niyah Chaudhari MD   Question: Antibiotic Indications Answer: Sepsis of Unknown Etiology    04     ED provider ordered 2000mg dose (4-3-22 at 0480 [id number]).  Will schedule a random level for 4-5-22 with am labs

## 2022-04-04 NOTE — PROGRESS NOTES
Report called to Jyoti Bowling RN via phone. Transferred patient to room 585 in stable condition with telemetry box, patient specific medications, and chart. Telephone call placed to michael Rene Moreno to notify of transfer.

## 2022-04-04 NOTE — PROGRESS NOTES
Dr. Luis Chacon on the floor. Ordered duo-neb PRN for patient. Called Respiratory to come and administer treatment.     Signed By: Sara Patterson RN     April 4, 2022

## 2022-04-04 NOTE — PROGRESS NOTES
Patient turned to left side by this nurse and Lianne Waldrop LPN.     Signed By: Jesse Jewell RN     April 4, 2022

## 2022-04-05 NOTE — PROGRESS NOTES
2 units PRBCs ordered for patient by Dr. Zara Payne. Verbal consent received via telephone by patient's daughter, Theta Homans. Consent signed by this nurse & Maryann Moore RN and placed in chart.      Signed By: Mike Carreon RN     April 5, 2022

## 2022-04-05 NOTE — PROGRESS NOTES
Patient's blood sugar 356. Per Dr. Jessica Chatman, give 15 units humalog and recheck sugar in 1 hour.     Signed By: Monae Rogers RN     April 5, 2022

## 2022-04-05 NOTE — PROGRESS NOTES
Night meds given, giraldo care done, assessment done, adjusted tube feed, turned patient. No acute distress noted, call bell in reach.

## 2022-04-05 NOTE — PROGRESS NOTES
PROGRESS NOTE    Patient: Allyson Curtis MRN: [id number]  SSN: [id number]    YOB: 1945  Age: 78 y.o. Sex: female      Admit Date: 4/3/2022    LOS: 1 day       Subjective     Chief Complaint   Patient presents with    Altered mental status       HPI: Patient is a 78y.o. 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 04/05   Patient is seen at bedside with daughter and nephew today. Patient is in distress due to pain and daughter notes that patient gets Neurontin TID for neuropathy daily. Otherwise, patient is still receiving enteric feeding through PEG tube and getting IVF. Patient was seen by nephrology yesterday. Recommends obtaining renal panel and continuing IVF. Patient was seen by ID yesterday. Recommends continuing IV vancomycin for urosepsis. Patient was seen by pulmonology today. Recommends PRBC transfusion. Labs indicate WBC 16.9, Hgb 6.3, Na 148, Cl 120, BUN 72, Cr 1.71, Ca 8, CRP 28.4, pro-carrie 6.93 and . ROS  Unable to obtain. Objective     Visit Vitals  BP (!) 160/55 (BP 1 Location: Right upper arm, BP Patient Position: At rest;Semi fowlers)   Pulse 74   Temp 99.7 °F (37.6 °C)   Resp 20   Ht 5' 7.99\" (1.727 m)   Wt 80.9 kg (178 lb 5.6 oz)   SpO2 98%   BMI 27.12 kg/m²    O2 Flow Rate (L/min): 2 l/min O2 Device: None (Room air)    Physical Exam:   Physical Exam  Constitutional: Open eyes do not follow any,.    HENT:   Head: Normocephalic and atraumatic. Eyes: Pupils are equal, round, and reactive to light. EOM are normal.   Cardiovascular: Normal rate, regular rhythm and normal heart sounds. Pulmonary/Chest: Breath sounds normal. No wheezes. No rales. Exhibits no tenderness. Abdominal: Soft. Bowel sounds are normal. There is no abdominal tenderness. There is no rebound and no guarding. Musculoskeletal: Normal range of motion. Neurological: Open eyes do not follow any, right-sided weakness  Intake & Output:  Current Shift: No intake/output data recorded. Last three shifts: 04/03 1901 - 04/05 0700  In: 2510 [I.V.:2510]  Out: 2600 [Urine:2600]    Lab/Data Review: All lab results for the last 24 hours reviewed. 24 Hour Results:    Recent Results (from the past 24 hour(s))   GLUCOSE, POC    Collection Time: 04/04/22  5:20 PM   Result Value Ref Range    Glucose (POC) 195 (H) 65 - 117 mg/dL    Performed by Alex Stevenson    GLUCOSE, POC    Collection Time: 04/04/22 10:28 PM   Result Value Ref Range    Glucose (POC) 233 (H) 65 - 117 mg/dL    Performed by [id number], POC    Collection Time: 04/05/22  5:51 AM   Result Value Ref Range    Glucose (POC) 299 (H) 65 - 117 mg/dL    Performed by ALYSA KEENE    CBC WITH AUTOMATED DIFF    Collection Time: 04/05/22  7:22 AM   Result Value Ref Range    WBC 16.9 (H) 3.6 - 11.0 K/uL    RBC 2.32 (L) 3.80 - 5.20 M/uL    HGB 6.3 (L) 11.5 - 16.0 g/dL    HCT 21.3 (L) 35.0 - 47.0 %    MCV 91.8 80.0 - 99.0 FL    MCH 27.2 26.0 - 34.0 PG    MCHC 29.6 (L) 30.0 - 36.5 g/dL    RDW 17.7 (H) 11.5 - 14.5 %    PLATELET 217 279 - 114 K/uL    MPV 11.5 8.9 - 12.9 FL    NRBC 0.2 (H) 0.0  WBC    ABSOLUTE NRBC 0.03 (H) 0.00 - 0.01 K/uL    NEUTROPHILS 80 (H) 32 - 75 %    LYMPHOCYTES 12 12 - 49 %    MONOCYTES 5 5 - 13 %    EOSINOPHILS 1 0 - 7 %    BASOPHILS 1 0 - 1 %    IMMATURE GRANULOCYTES 1 (H) 0 - 0.5 %    ABS. NEUTROPHILS 13.6 (H) 1.8 - 8.0 K/UL    ABS.  LYMPHOCYTES 2.1 0.8 - 3.5 K/UL ABS. MONOCYTES 0.9 0.0 - 1.0 K/UL    ABS. EOSINOPHILS 0.2 0.0 - 0.4 K/UL    ABS. BASOPHILS 0.1 0.0 - 0.1 K/UL    ABS. IMM. GRANS. 0.1 (H) 0.00 - 0.04 K/UL    DF AUTOMATED     METABOLIC PANEL, COMPREHENSIVE    Collection Time: 04/05/22  7:22 AM   Result Value Ref Range    Sodium 148 (H) 136 - 145 mmol/L    Potassium 5.0 3.5 - 5.1 mmol/L    Chloride 120 (H) 97 - 108 mmol/L    CO2 22 21 - 32 mmol/L    Anion gap 6 5 - 15 mmol/L    Glucose 332 (H) 65 - 100 mg/dL    BUN 72 (H) 6 - 20 mg/dL    Creatinine 1.71 (H) 0.55 - 1.02 mg/dL    BUN/Creatinine ratio 42 (H) 12 - 20      GFR est AA 35 (L) >60 ml/min/1.73m2    GFR est non-AA 29 (L) >60 ml/min/1.73m2    Calcium 8.0 (L) 8.5 - 10.1 mg/dL    Bilirubin, total 0.4 0.2 - 1.0 mg/dL    AST (SGOT) 142 (H) 15 - 37 U/L    ALT (SGPT) 100 (H) 12 - 78 U/L    Alk.  phosphatase 140 (H) 45 - 117 U/L    Protein, total 6.7 6.4 - 8.2 g/dL    Albumin 1.7 (L) 3.5 - 5.0 g/dL    Globulin 5.0 (H) 2.0 - 4.0 g/dL    A-G Ratio 0.3 (L) 1.1 - 2.2     C REACTIVE PROTEIN, QT    Collection Time: 04/05/22  7:22 AM   Result Value Ref Range    C-Reactive protein 28.40 (H) 0.00 - 0.60 mg/dL   PROCALCITONIN    Collection Time: 04/05/22  7:22 AM   Result Value Ref Range    Procalcitonin 6.93 (H) 0 ng/mL   GLUCOSE, POC    Collection Time: 04/05/22  7:39 AM   Result Value Ref Range    Glucose (POC) 351 (H) 65 - 117 mg/dL    Performed by [address]    Collection Time: 04/05/22 11:12 AM   Result Value Ref Range    Crossmatch Expiration 04/08/2022,2359     ABO/Rh(D) Ruby Net Positive     Antibody screen Negative     Unit number [id number]     Blood component type RC LR     Unit division 00     Status of unit Allocated     TRANSFUSION STATUS Ok to transfuse     Crossmatch result Compatible    GLUCOSE, POC    Collection Time: 04/05/22 11:35 AM   Result Value Ref Range    Glucose (POC) 357 (H) 65 - 117 mg/dL    Performed by [facility]          Imaging:    CT HEAD WO CONT   Final Result Chronic changes which appear stable. No acute or active intracranial process. Chronic paranasal sinus disease         XR CHEST PORT   Final Result   No acute findings. Assessment     Severe sepsis with UTI  UTI  Acute on chronic kidney disease  Hyperkalemia  CVA with right-sided weakness  Anemia of chronic disease  Gangrene of the left great toe  Sacral decubitus ulcer    Plan   Patient received IV fluid in the ER     IV Zosyn   sliding scale insulin  Aspirin 81 mg daily  Lipitor 20 mg daily  Alphagan 0.2% 3 times a day  Ferrous sulfate 325 mg twice a day  Heparin 5000 units subcu every 8 hours  Half-normal saline at 100 cc/h  Duo-neb 2.5 mg-0.5 mg PRN  Daily renal panel per nephrology recommendation. PRBC transfusion.    Full code.     Pulmonary nephrology infectious disease and vascular surgical      Current Facility-Administered Medications:     0.9% sodium chloride infusion 250 mL, 250 mL, IntraVENous, PRN, Martínez Crespo MD    gabapentin (NEURONTIN) capsule 300 mg, 300 mg, Oral, TID, Nancy Soto MD    atorvastatin (LIPITOR) tablet 20 mg, 20 mg, Oral, QHS, Israel Soto MD, 20 mg at 04/04/22 2246    latanoprost (XALATAN) 0.005 % ophthalmic solution 1 Drop, 1 Drop, Right Eye, QPM, Dusty Garcia MD, 1 Drop at 04/04/22 1733    aspirin delayed-release tablet 81 mg, 81 mg, Oral, DAILY, Israel Soto MD, 81 mg at 04/05/22 0836    brimonidine (ALPHAGAN) 0.2 % ophthalmic solution 1 Drop, 1 Drop, Both Eyes, TID, Dusty Garcia MD, 1 Drop at 04/05/22 0837    insulin lispro (HUMALOG) injection, , SubCUTAneous, AC&HS, Israel Soto MD, 10 Units at 04/05/22 1213    glucose chewable tablet 16 g, 4 Tablet, Oral, PRN, Israel Soto MD    glucagon (GLUCAGEN) injection 1 mg, 1 mg, IntraMUSCular, PRN, Dusty Garcia MD  [facility]  [DISCONTINUED] acetaminophen (TYLENOL) tablet 650 mg, 650 mg, Oral, Q6H PRN, 650 mg at 04/04/22 2246 **OR** acetaminophen (TYLENOL) suppository 650 mg, 650 mg, Rectal, Q6H PRN, Lynne Soto MD    polyethylene glycol (MIRALAX) packet 17 g, 17 g, Oral, DAILY PRN, Lynne Soto MD    ondansetron (ZOFRAN ODT) tablet 4 mg, 4 mg, Oral, Q8H PRN **OR** ondansetron (ZOFRAN) injection 4 mg, 4 mg, IntraVENous, Q6H PRN, Israel Soto MD    heparin (porcine) injection 5,000 Units, 5,000 Units, SubCUTAneous, Q8H, Israel Soto MD, 5,000 Units at 04/05/22 0555    0.45% sodium chloride infusion, 100 mL/hr, IntraVENous, CONTINUOUS, Israel Soto MD, Last Rate: 100 mL/hr at 04/05/22 0555, 100 mL/hr at 04/05/22 0555    piperacillin-tazobactam (ZOSYN) 3.375 g in 0.9% sodium chloride (MBP/ADV) 100 mL MBP, 3.375 g, IntraVENous, Q8H, Israel Soto MD, Last Rate: 25 mL/hr at 04/05/22 0603, 3.375 g at 04/05/22 0603    albuterol-ipratropium (DUO-NEB) 2.5 MG-0.5 MG/3 ML, 3 mL, Nebulization, Q6H PRN, Lynne Soto MD    ferrous sulfate 300 mg (60 mg iron)/5 mL oral syrup 300 mg, 300 mg, Oral, BID, Israel Soto MD, 300 mg at 04/05/22 3369    acetaminophen (TYLENOL) solution 650 mg, 650 mg, Per NG tube, Q6H PRN, Israel Soto MD, 650 mg at 04/05/22 1036    Current Outpatient Medications   Medication Instructions    acidophilus-pectin, citrus 25 million cell -100 mg tab tablet 2 Tablets, Oral, DAILY    amLODIPine (NORVASC) 5 mg, Oral, DAILY    artificial saliva (MOUTH KOTE) spra 1 Spray, Oral, 3 TIMES DAILY    aspirin delayed-release 81 mg tablet Oral, DAILY    atorvastatin (LIPITOR) 20 mg tablet TAKE 1 TABLET BY MOUTH EVERY NIGHT    brimonidine (ALPHAGAN) 0.2 % ophthalmic solution 1 Drop, Both Eyes, 3 TIMES DAILY    ferrous sulfate 325 mg, Oral, 2 TIMES DAILY    gabapentin (NEURONTIN) 300 mg, Oral, 2 TIMES DAILY    insulin glargine (LANTUS) 50 Units, SubCUTAneous, DAILY    latanoprost (XALATAN) 0.005 % ophthalmic solution 1 Drop, Right Eye, EVERY EVENING    lidocaine (XYLOCAINE) 4 % (40 mg/mL) topical solution 1 mL, Topical, AS NEEDED  metoprolol succinate (TOPROL-XL) 50 mg, Oral, 2 TIMES DAILY         Signed By: Whitney Oconnell MD     April 5, 2022

## 2022-04-05 NOTE — PROGRESS NOTES
0830: Chart reviewed. CM has messaged [facility] via [facility] to determine if they are able to accept patient when medically stable for discharge. 1100: CM received call from [facility] with [facility] ER requesting patient status update. Update provided. Sarina to f/u in the next few days in regards to DCP. 1310: CM telephoned [facility] liaison to inquire about acceptance status. She reports upper management is reviewing case and will update CM when decision is made. 1410: Updated clinicals attached in [place] [address] [address] to [facility].

## 2022-04-05 NOTE — PROGRESS NOTES
PROGRESS NOTE    Patient: Elmer Jimenes MRN: [id number]  SSN: [id number]    YOB: 1945  Age: 78 y.o. Sex: female      Admit Date: 4/3/2022    LOS: 1 day       Subjective     Chief Complaint   Patient presents with    Altered mental status       HPI: Patient is a 78y.o. 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 04/05   Patient is seen at bedside with daughter and nephew today. Patient is in distress due to pain and daughter notes that patient gets Neurontin TID for neuropathy daily. Otherwise, patient is still receiving enteric feeding through PEG tube and getting IVF. Patient was seen by nephrology yesterday. Recommends obtaining renal panel and continuing IVF. Patient was seen by ID yesterday. Recommends continuing IV vancomycin for urosepsis. Patient was seen by pulmonology today. Recommends PRBC transfusion. Labs indicate WBC 16.9, Hgb 6.3, Na 148, Cl 120, BUN 72, Cr 1.71, Ca 8, CRP 28.4, pro-carrie 6.93 and . ROS  Unable to obtain. Objective     Visit Vitals  BP (!) 156/57 (BP 1 Location: Right upper arm, BP Patient Position: At rest;Semi fowlers)   Pulse 74   Temp 99.2 °F (37.3 °C)   Resp 20   Ht 5' 7.99\" (1.727 m)   Wt 178 lb 5.6 oz (80.9 kg)   SpO2 98%   BMI 27.12 kg/m²    O2 Flow Rate (L/min): 2 l/min O2 Device: None (Room air)    Physical Exam:   Physical Exam  Constitutional: Open eyes do not follow any,.    HENT:   Head: Normocephalic and atraumatic. Eyes: Pupils are equal, round, and reactive to light. EOM are normal.   Cardiovascular: Normal rate, regular rhythm and normal heart sounds. Pulmonary/Chest: Breath sounds normal. No wheezes. No rales. Exhibits no tenderness. Abdominal: Soft. Bowel sounds are normal. There is no abdominal tenderness. There is no rebound and no guarding. Musculoskeletal: Normal range of motion. Neurological: Open eyes do not follow any, right-sided weakness  Intake & Output:  Current Shift: No intake/output data recorded. Last three shifts: 04/03 1901 - 04/05 0700  In: 2510 [I.V.:2510]  Out: 2600 [Urine:2600]    Lab/Data Review: All lab results for the last 24 hours reviewed.        24 Hour Results:    Recent Results (from the past 24 hour(s))   GLUCOSE, POC    Collection Time: 04/04/22 11:24 AM   Result Value Ref Range    Glucose (POC) 227 (H) 65 - 117 mg/dL    Performed by Timothy Dwyer    GLUCOSE, POC    Collection Time: 04/04/22  5:20 PM   Result Value Ref Range    Glucose (POC) 195 (H) 65 - 117 mg/dL    Performed by Timothy Dwyer    GLUCOSE, POC    Collection Time: 04/04/22 10:28 PM   Result Value Ref Range    Glucose (POC) 233 (H) 65 - 117 mg/dL    Performed by [address], POC    Collection Time: 04/05/22  5:51 AM   Result Value Ref Range    Glucose (POC) 299 (H) 65 - 117 mg/dL    Performed by ALYSA KEENE    CBC WITH AUTOMATED DIFF    Collection Time: 04/05/22  7:22 AM   Result Value Ref Range    WBC 16.9 (H) 3.6 - 11.0 K/uL    RBC 2.32 (L) 3.80 - 5.20 M/uL    HGB 6.3 (L) 11.5 - 16.0 g/dL    HCT 21.3 (L) 35.0 - 47.0 %    MCV 91.8 80.0 - 99.0 FL    MCH 27.2 26.0 - 34.0 PG    MCHC 29.6 (L) 30.0 - 36.5 g/dL    RDW 17.7 (H) 11.5 - 14.5 %    PLATELET 448 324 - 141 K/uL    MPV 11.5 8.9 - 12.9 FL    NRBC 0.2 (H) 0.0  WBC    ABSOLUTE NRBC 0.03 (H) 0.00 - 0.01 K/uL    NEUTROPHILS 80 (H) 32 - 75 %    LYMPHOCYTES 12 12 - 49 %    MONOCYTES 5 5 - 13 %    EOSINOPHILS 1 0 - 7 %    BASOPHILS 1 0 - 1 %    IMMATURE GRANULOCYTES 1 (H) 0 - 0.5 %    ABS. NEUTROPHILS 13.6 (H) 1.8 - 8.0 K/UL    ABS. LYMPHOCYTES 2.1 0.8 - 3.5 K/UL    ABS. MONOCYTES 0.9 0.0 - 1.0 K/UL    ABS. EOSINOPHILS 0.2 0.0 - 0.4 K/UL    ABS. BASOPHILS 0.1 0.0 - 0.1 K/UL    ABS. IMM. GRANS. 0.1 (H) 0.00 - 0.04 K/UL    DF AUTOMATED     METABOLIC PANEL, COMPREHENSIVE    Collection Time: 04/05/22  7:22 AM   Result Value Ref Range    Sodium 148 (H) 136 - 145 mmol/L    Potassium 5.0 3.5 - 5.1 mmol/L    Chloride 120 (H) 97 - 108 mmol/L    CO2 22 21 - 32 mmol/L    Anion gap 6 5 - 15 mmol/L    Glucose 332 (H) 65 - 100 mg/dL    BUN 72 (H) 6 - 20 mg/dL    Creatinine 1.71 (H) 0.55 - 1.02 mg/dL    BUN/Creatinine ratio 42 (H) 12 - 20      GFR est AA 35 (L) >60 ml/min/1.73m2    GFR est non-AA 29 (L) >60 ml/min/1.73m2    Calcium 8.0 (L) 8.5 - 10.1 mg/dL    Bilirubin, total 0.4 0.2 - 1.0 mg/dL    AST (SGOT) 142 (H) 15 - 37 U/L    ALT (SGPT) 100 (H) 12 - 78 U/L    Alk. phosphatase 140 (H) 45 - 117 U/L    Protein, total 6.7 6.4 - 8.2 g/dL    Albumin 1.7 (L) 3.5 - 5.0 g/dL    Globulin 5.0 (H) 2.0 - 4.0 g/dL    A-G Ratio 0.3 (L) 1.1 - 2.2     C REACTIVE PROTEIN, QT    Collection Time: 04/05/22  7:22 AM   Result Value Ref Range    C-Reactive protein 28.40 (H) 0.00 - 0.60 mg/dL   GLUCOSE, POC    Collection Time: 04/05/22  7:39 AM   Result Value Ref Range    Glucose (POC) 351 (H) 65 - 117 mg/dL    Performed by [facility]          Imaging:    CT HEAD WO CONT   Final Result   Chronic changes which appear stable. No acute or active intracranial process. Chronic paranasal sinus disease         XR CHEST PORT   Final Result   No acute findings.              Assessment     Severe sepsis with UTI  UTI  Acute on chronic kidney disease  Hyperkalemia  CVA with right-sided weakness  Anemia of chronic disease  Gangrene of the left great toe    Plan   Patient received IV fluid in the ER     IV Zosyn and vancomycin  sliding scale insulin  Aspirin 81 mg daily  Lipitor 20 mg daily  Alphagan 0.2% 3 times a day  Ferrous sulfate 325 mg twice a day  Heparin 5000 units subcu every 8 hours  Half-normal saline at 100 cc/h  Duo-neb 2.5 mg-0.5 mg PRN  Daily renal panel per nephrology recommendation. PRBC transfusion.    Full code.     Pulmonary nephrology infectious disease and vascular surgical      Current Facility-Administered Medications:     atorvastatin (LIPITOR) tablet 20 mg, 20 mg, Oral, QHS, Israel Soto MD, 20 mg at 04/04/22 2246    latanoprost (XALATAN) 0.005 % ophthalmic solution 1 Drop, 1 Drop, Right Eye, QPM, Abril Sherwood MD, 1 Drop at 04/04/22 1733    aspirin delayed-release tablet 81 mg, 81 mg, Oral, DAILY, Israel Soto MD, 81 mg at 04/05/22 0836    brimonidine (ALPHAGAN) 0.2 % ophthalmic solution 1 Drop, 1 Drop, Both Eyes, TID, Abril Sherwood MD, 1 Drop at 04/05/22 2234    insulin lispro (HUMALOG) injection, , SubCUTAneous, AC&HS, Abril Sherwood MD, 10 Units at 04/05/22 0836    glucose chewable tablet 16 g, 4 Tablet, Oral, PRN, Jonah Soto MD    glucagon (GLUCAGEN) injection 1 mg, 1 mg, IntraMUSCular, PRN, Abril Sherwood MD  [facility]  [DISCONTINUED] acetaminophen (TYLENOL) tablet 650 mg, 650 mg, Oral, Q6H PRN, 650 mg at 04/04/22 2246 **OR** acetaminophen (TYLENOL) suppository 650 mg, 650 mg, Rectal, Q6H PRN, Jonah Soto MD    polyethylene glycol (MIRALAX) packet 17 g, 17 g, Oral, DAILY PRN, Jonah Soto MD    ondansetron (ZOFRAN ODT) tablet 4 mg, 4 mg, Oral, Q8H PRN **OR** ondansetron (ZOFRAN) injection 4 mg, 4 mg, IntraVENous, Q6H PRN, Israel Soto MD    heparin (porcine) injection 5,000 Units, 5,000 Units, SubCUTAneous, Q8H, Israel Soto MD, 5,000 Units at 04/05/22 0555    0.45% sodium chloride infusion, 100 mL/hr, IntraVENous, CONTINUOUS, Israel Soto MD, Last Rate: 100 mL/hr at 04/05/22 0555, 100 mL/hr at 04/05/22 0555    piperacillin-tazobactam (ZOSYN) 3.375 g in 0.9% sodium chloride (MBP/ADV) 100 mL MBP, 3.375 g, IntraVENous, Q8H, Israel Soto MD, Last Rate: 25 mL/hr at 04/05/22 0603, 3.375 g at 04/05/22 0603    albuterol-ipratropium (DUO-NEB) 2.5 MG-0.5 MG/3 ML, 3 mL, Nebulization, Q6H PRN, Zoya Soto MD    ferrous sulfate 300 mg (60 mg iron)/5 mL oral syrup 300 mg, 300 mg, Oral, BID, Zoya Soto MD, 300 mg at 04/05/22 5793    acetaminophen (TYLENOL) solution 650 mg, 650 mg, Per NG tube, Q6H PRN, Israel Soto MD, 650 mg at 04/05/22 0246    Current Outpatient Medications   Medication Instructions    acidophilus-pectin, citrus 25 million cell -100 mg tab tablet 2 Tablets, Oral, DAILY    amLODIPine (NORVASC) 5 mg, Oral, DAILY    artificial saliva (MOUTH KOTE) spra 1 Spray, Oral, 3 TIMES DAILY    aspirin delayed-release 81 mg tablet Oral, DAILY    atorvastatin (LIPITOR) 20 mg tablet TAKE 1 TABLET BY MOUTH EVERY NIGHT    brimonidine (ALPHAGAN) 0.2 % ophthalmic solution 1 Drop, Both Eyes, 3 TIMES DAILY    ferrous sulfate 325 mg, Oral, 2 TIMES DAILY    gabapentin (NEURONTIN) 300 mg, Oral, 2 TIMES DAILY    insulin glargine (LANTUS) 50 Units, SubCUTAneous, DAILY    latanoprost (XALATAN) 0.005 % ophthalmic solution 1 Drop, Right Eye, EVERY EVENING    lidocaine (XYLOCAINE) 4 % (40 mg/mL) topical solution 1 mL, Topical, AS NEEDED    metoprolol succinate (TOPROL-XL) 50 mg, Oral, 2 TIMES DAILY         Signed By: Zeus Luz     April 5, 2022

## 2022-04-05 NOTE — PROGRESS NOTES
IMPRESSION:   1. Severe sepsis  2. Urinary tract infection  3. Acute kidney injury  4. Hyperkalemia  5. History of CVA with right-sided weakness  6. Anemia      RECOMMENDATIONS/PLAN:   1. She is on Zosyn vancomycin  2. Probably dehydration sepsis infection start patient on IV fluid  3. Chest x-ray no acute infiltrate  4. Will transfuse packed RBC 1 unit     [x] High complexity decision making was performed  [x] See my orders for details  HPI  74-year-old lady nursing home resident came in as she was found confused with altered mental status family noted that she was not acting properly she is in a nursing home unable to get any history to the patient she has significant past medical history of CVA right-sided weakness hypertension diabetes mellitus she is bedridden open eyes does not follow any command possibly septic so admitted, and critical care consult was called  PMH:  has a past medical history of Diabetes mellitus ([address] HTN (hypertension), Hyperlipidemia, Neuropathy, PAD (peripheral artery disease) ([address] and Sciatica. PSH:   has a past surgical history that includes hx other surgical (Bilateral); hx amputation (Right); hx other surgical; hx cervical fusion; hx vascular stent (Bilateral); and hx vascular stent (Bilateral). FHX: family history includes Cancer in her mother; Diabetes in her mother; Hypertension in her mother. SHX:  reports that she has never smoked. She has never used smokeless tobacco. She reports previous alcohol use. She reports that she does not use drugs.     ALL: No Known Allergies     MEDS:   [x] Reviewed - As Below   [] Not reviewed    Current Facility-Administered Medications   Medication    atorvastatin (LIPITOR) tablet 20 mg    latanoprost (XALATAN) 0.005 % ophthalmic solution 1 Drop    aspirin delayed-release tablet 81 mg    brimonidine (ALPHAGAN) 0.2 % ophthalmic solution 1 Drop    insulin lispro (HUMALOG) injection    glucose chewable tablet 16 g    glucagon (GLUCAGEN) injection 1 mg    acetaminophen (TYLENOL) suppository 650 mg    polyethylene glycol (MIRALAX) packet 17 g    ondansetron (ZOFRAN ODT) tablet 4 mg    Or    ondansetron (ZOFRAN) injection 4 mg    heparin (porcine) injection 5,000 Units    0.45% sodium chloride infusion    piperacillin-tazobactam (ZOSYN) 3.375 g in 0.9% sodium chloride (MBP/ADV) 100 mL MBP    albuterol-ipratropium (DUO-NEB) 2.5 MG-0.5 MG/3 ML    ferrous sulfate 300 mg (60 mg iron)/5 mL oral syrup 300 mg    acetaminophen (TYLENOL) solution 650 mg      MAR reviewed and pertinent medications noted or modified as needed   Current Facility-Administered Medications   Medication    atorvastatin (LIPITOR) tablet 20 mg    latanoprost (XALATAN) 0.005 % ophthalmic solution 1 Drop    aspirin delayed-release tablet 81 mg    brimonidine (ALPHAGAN) 0.2 % ophthalmic solution 1 Drop    insulin lispro (HUMALOG) injection    glucose chewable tablet 16 g    glucagon (GLUCAGEN) injection 1 mg    acetaminophen (TYLENOL) suppository 650 mg    polyethylene glycol (MIRALAX) packet 17 g    ondansetron (ZOFRAN ODT) tablet 4 mg    Or    ondansetron (ZOFRAN) injection 4 mg    heparin (porcine) injection 5,000 Units    0.45% sodium chloride infusion    piperacillin-tazobactam (ZOSYN) 3.375 g in 0.9% sodium chloride (MBP/ADV) 100 mL MBP    albuterol-ipratropium (DUO-NEB) 2.5 MG-0.5 MG/3 ML    ferrous sulfate 300 mg (60 mg iron)/5 mL oral syrup 300 mg    acetaminophen (TYLENOL) solution 650 mg      PMH:  has a past medical history of Diabetes mellitus ([address] HTN (hypertension), Hyperlipidemia, Neuropathy, PAD (peripheral artery disease) ([address] and Sciatica. PSH:   has a past surgical history that includes hx other surgical (Bilateral); hx amputation (Right); hx other surgical; hx cervical fusion; hx vascular stent (Bilateral); and hx vascular stent (Bilateral).    FHX: family history includes Cancer in her mother; Diabetes in her mother; Hypertension in her mother. SHX:  reports that she has never smoked. She has never used smokeless tobacco. She reports previous alcohol use. She reports that she does not use drugs. ROS:  Unable to obtain unresponsive    Hemodynamics:    CO:    CI:    CVP:    SVR:   PAP Systolic:    PAP Diastolic:    PVR:    CI05:        Ventilator Settings:      Mode Rate TV Press PEEP FiO2 PIP Min. Vent                              Vital Signs: Telemetry:    normal sinus rhythm Intake/Output:   Visit Vitals  BP (!) 156/57 (BP 1 Location: Right upper arm, BP Patient Position: At rest;Semi fowlers)   Pulse 74   Temp 99.2 °F (37.3 °C)   Resp 20   Ht 5' 7.99\" (1.727 m)   Wt 80.9 kg (178 lb 5.6 oz)   SpO2 98%   BMI 27.12 kg/m²       Temp (24hrs), Av.9 °F (37.2 °C), Min:97.9 °F (36.6 °C), Max:100.1 °F (37.8 °C)        O2 Device: None (Room air) O2 Flow Rate (L/min): 2 l/min       Wt Readings from Last 4 Encounters:   22 80.9 kg (178 lb 5.6 oz)   22 82.2 kg (181 lb 3.5 oz)   20 84.4 kg (186 lb)   20 84.8 kg (187 lb)          Intake/Output Summary (Last 24 hours) at 2022 1012  Last data filed at 2022 5718  Gross per 24 hour   Intake --   Output 2100 ml   Net -2100 ml       Last shift:      No intake/output data recorded. Last 3 shifts:  1901 -  0700  In: 2510 [I.V.:2510]  Out: 2600 [Urine:2600]       Physical Exam:     General: Open eyes but does not follow any command  HEENT: NCAT, poor dentition, lips and mucosa dry  Eyes: anicteric; conjunctiva clear  Neck: no nodes, trach midline; no accessory MM use. Chest: no deformity,   Cardiac: R regular; no murmur;   Lungs: distant breath sounds; no wheezes  Abd: soft, NT, hypoactive BS  Ext: no edema; no joint swelling;  No clubbing  : NO giraldo, clear urine  Neuro: She only moves her left arm  Psych- unable to assess  Skin: warm, dry, no cyanosis;   Pulses: 1-2+ Bilateral pedal, radial  Capillary: brisk; pale      DATA:    MAR reviewed and pertinent medications noted or modified as needed  MEDS:   Current Facility-Administered Medications   Medication    atorvastatin (LIPITOR) tablet 20 mg    latanoprost (XALATAN) 0.005 % ophthalmic solution 1 Drop    aspirin delayed-release tablet 81 mg    brimonidine (ALPHAGAN) 0.2 % ophthalmic solution 1 Drop    insulin lispro (HUMALOG) injection    glucose chewable tablet 16 g    glucagon (GLUCAGEN) injection 1 mg    acetaminophen (TYLENOL) suppository 650 mg    polyethylene glycol (MIRALAX) packet 17 g    ondansetron (ZOFRAN ODT) tablet 4 mg    Or    ondansetron (ZOFRAN) injection 4 mg    heparin (porcine) injection 5,000 Units    0.45% sodium chloride infusion    piperacillin-tazobactam (ZOSYN) 3.375 g in 0.9% sodium chloride (MBP/ADV) 100 mL MBP    albuterol-ipratropium (DUO-NEB) 2.5 MG-0.5 MG/3 ML    ferrous sulfate 300 mg (60 mg iron)/5 mL oral syrup 300 mg    acetaminophen (TYLENOL) solution 650 mg        Labs:    Recent Labs     04/05/22  0722 04/03/22 2243   WBC 16.9* 16.9*   HGB 6.3* 7.3*    321     Recent Labs     04/05/22  0722 04/04/22  0600 04/04/22  0254 04/03/22  2243   *  --  147* 144   K 5.0  --  5.6* 6.2*   *  --  120* 114*   CO2 22  --  23 25   *  --  223* 272*   BUN 72*  --  94* 105*   CREA 1.71*  --  2.41* 3.00*   CA 8.0*  --  8.2* 8.3*   LAC  --  1.2  --  1.7   ALB 1.7*  --   --  2.0*   *  --   --  94*     Recent Labs     04/04/22  0232 04/03/22 2243   PH 7.39  --    PCO2 39  --    PO2 121*  --    HCO3 23  --    FIO2  --  21.0     No results for input(s): CPK, CKNDX, TROIQ in the last 72 hours. No lab exists for component: CPKMB  No results found for: BNPP, BNP   Lab Results   Component Value Date/Time    Culture result: No growth after 20 hours 04/03/2022 10:43 PM    Culture result: No growth 6 days 03/05/2022 10:16 AM    Culture result: Heavy Escherichia coli 02/28/2022 08:15 PM    Culture result:  Moderate  Mixed skin yasmine isolated 02/28/2022 08:15 PM     Lab Results   Component Value Date/Time    TSH 2.880 12/12/2016 10:13 AM        Imaging:    Results from Hospital Encounter encounter on 04/03/22    XR CHEST PORT    Narrative  Upright radiograph chest 10:46 p.m. compared to January 25, 2022. INDICATION: Altered mental status. Heart size is enlarged with an atherosclerotic aorta. No significant infiltrate  or effusion. Bones appear intact. Degenerative changes of the shoulders. Postfusion changes lower cervical spine. Impression  No acute findings. Results from [place] encounter on 04/03/22    CT HEAD WO CONT    Narrative  PROCEDURE: CT HEAD WO CONT    HISTORY:Altered mental status    COMPARISON:Head CT 3 March 2022    Department policy stipulates all CT scans at this facility are performed using  dose reduction optimization techniques as appropriate to the performed exam,  including the following: Automated exposure control, adjustments of the mA  and/or KVP according to the patient size, and the use of iterative  reconstruction technique. TECHNIQUE: Without IV contrast    Bones/extracranial soft tissues:  Maxillary sinuses and right frontal sinus  remains nearly completely opacified. Extra-axial spaces:  No hemorrhage, mass or focal fluid collection. Ventricles/sulci:  There is mild dilatation of the ventricles. Sulci are  prominent. Brain parenchyma: An area of encephalomalacia in the left frontal lobe is  showing chronic evolution without evidence of hemorrhagic conversion. No other  focal lesions identified. No mass effect. There is good gray-white differentiation. There are  inhomogeneous areas of mildly decreased density in periventricular white matter. Impression  Chronic changes which appear stable. No acute or active intracranial process.   Chronic paranasal sinus disease

## 2022-04-05 NOTE — PROGRESS NOTES
Progress Note    Patient: Oliverio Pablo MRN: [id number]  SSN: [id number]    YOB: 1945  Age: 78 y.o. Sex: female      Admit Date: 4/3/2022    LOS: 1 day     Subjective:   Patient followed for sepsis with suspected UTI. Blood and urine cultures pending. She is having low grade temperatures with persistent leukocytosis and elevated procal and CRP. Objective:     Vitals:    04/04/22 1531 04/04/22 2217 04/04/22 2344 04/05/22 0744   BP: 133/60 139/75 (!) 148/53 (!) 156/57   Pulse: 76 80 80 74   Resp: 20 20 20 20   Temp: 98.8 °F (37.1 °C) 100.1 °F (37.8 °C) 99.6 °F (37.6 °C) 99.2 °F (37.3 °C)   SpO2: 99% 99% 99% 98%   Weight:       Height:            Intake and Output:  Current Shift: No intake/output data recorded. Last three shifts: 04/03 1901 - 04/05 0700  In: 2510 [I.V.:2510]  Out: 2600 [Urine:2600]    Physical Exam:   Vitals and nursing note reviewed. Constitutional:       General: She is not in acute distress. Appearance: She is ill-appearing. HENT:      Head: Normocephalic and atraumatic. Right Ear: External ear normal.      Left Ear: External ear normal.      Nose: Nose normal.      Mouth/Throat:      Mouth: Mucous membranes are dry. Comments: Poor dentition  Eyes:      Pupils: Pupils are equal, round, and reactive to light. Cardiovascular:      Rate and Rhythm: Normal rate and regular rhythm. Heart sounds: No murmur heard.       Pulmonary:      Effort: Pulmonary effort is normal.      Breath sounds: Normal breath sounds. Abdominal:      General: Bowel sounds are normal.      Palpations: Abdomen is soft. Tenderness: There is no abdominal tenderness. Comments: PEG tube in place, site unremarkable   Genitourinary:     Comments: Bell catheter  Musculoskeletal:      Cervical back: Neck supple. Right lower leg: No edema. Left lower leg: No edema.       Comments: Left great toe with dry gangrene, dark and shrunken   Skin: Stage 2 sacral wound     Findings: No rash. Neurological:      Mental Status: She is alert. Comments: Unable to assess   Psychiatric:      Comments: Unable to assess      Lab/Data Review:     WBC 16,900    Procal 6.93  CRP 28.40    Blood cultures (4/3) No growth 20 hours  Urine culture (4/3) Pending  Assessment:     Active Problems:    Hyperkalemia (4/4/2022)      UTI (urinary tract infection) (4/4/2022)      Sepsis ([facility] [address] (4/4/2022)      RICARDO (acute kidney injury) ([facility] [address] (4/4/2022)      AMS (altered mental status) (4/4/2022)    1. Sepsis with fever and leukocytosis, elevated procal and CRP  2. Probable UTI with marked pyuria and bacteriuria, urine culture pending  3. Altered mentals status, multifactorial including sepsis  4. Uncontrolled diabetes mellitus with hyperglycemia  5. Chronic maxillary sinusitis  6. ASCVD with prior stroke  7. Hepatitis C antibody positive, HCV RNA negative  8. PAD with dry gangrene left great toe, Vascular Surgery consulted  9. Sacral wound, Stage 2, no evidence of gross infection     Comment: This appears to be most likely urosepsis from Gram negative rods, and therefore, reasonable to discontinue Vancomycin for now. Renal ultrasound in Feb 2022 showed no stones or obstruction. There is no record of prior UTIs with multi-drug resistant organisms    Plan:   1. Continue IV Zosyn  2. Follow-up blood and urine cultures  4.  In am, repeat CBC, procal and CRP       Signed By: Nakia Freitas MD     April 5, 2022

## 2022-04-05 NOTE — PROGRESS NOTES
Consult Date: 4/5/2022      Subjective   HISTORY OF PRESENTING ILLNESS   Patient was seen and examined. Not verbal to me. Resting in bed.       Past Medical History:   Diagnosis Date    Diabetes mellitus ([address]     HTN (hypertension)     Hyperlipidemia     Neuropathy     PAD (peripheral artery disease) (HCC)     PCI    Sciatica       Past Surgical History:   Procedure Laterality Date    HX AMPUTATION Right     great toe    HX CERVICAL FUSION      HX OTHER SURGICAL Bilateral     Stent placement    HX OTHER SURGICAL      HX VASCULAR STENT Bilateral     2 in right 1 in left    HX VASCULAR STENT Bilateral      Family History   Problem Relation Age of Onset    Cancer Mother     Diabetes Mother     Hypertension Mother       Social History     Tobacco Use    Smoking status: Never Smoker    Smokeless tobacco: Never Used   Substance Use Topics    Alcohol use: Not Currently       Current Facility-Administered Medications   Medication Dose Route Frequency Provider Last Rate Last Admin    0.9% sodium chloride infusion 250 mL  250 mL IntraVENous PRN Martínez Crespo MD        gabapentin (NEURONTIN) capsule 300 mg  300 mg Oral TID Israel Soto MD        atorvastatin (LIPITOR) tablet 20 mg  20 mg Oral QHS Israel Soto MD   20 mg at 04/04/22 2246    latanoprost (XALATAN) 0.005 % ophthalmic solution 1 Drop  1 Drop Right Eye QPM Israel Soto MD   1 Drop at 04/04/22 1733    aspirin delayed-release tablet 81 mg  81 mg Oral DAILY Israel Soto MD   81 mg at 04/05/22 0836    brimonidine (ALPHAGAN) 0.2 % ophthalmic solution 1 Drop  1 Drop Both Eyes TID Rell Samuel MD   1 Drop at 04/05/22 8342    insulin lispro (HUMALOG) injection   SubCUTAneous AC&HS Israel Soto MD   10 Units at 04/05/22 1213    glucose chewable tablet 16 g  4 Tablet Oral PRN Ludy Soto MD        glucagon (GLUCAGEN) injection 1 mg  1 mg IntraMUSCular PRN Ludy Soto MD        acetaminophen (TYLENOL) suppository 650 mg  650 mg Rectal Q6H PRN Ludy Soto MD        polyethylene glycol (MIRALAX) packet 17 g  17 g Oral DAILY PRN Ludy Soto MD        ondansetron (ZOFRAN ODT) tablet 4 mg  4 mg Oral Q8H PRN Ludy Soto MD        Or    ondansetron [facility]) injection 4 mg  4 mg IntraVENous Q6H PRN Ludy Soto MD        heparin (porcine) injection 5,000 Units  5,000 Units SubCUTAneous Q8H Israel Soto MD   5,000 Units at 04/05/22 0555    0.45% sodium chloride infusion  100 mL/hr IntraVENous CONTINUOUS Ludy Soto  mL/hr at 04/05/22 0555 100 mL/hr at 04/05/22 0555    piperacillin-tazobactam (ZOSYN) 3.375 g in 0.9% sodium chloride (MBP/ADV) 100 mL MBP  3.375 g IntraVENous Q8H Israel Soto MD 25 mL/hr at 04/05/22 0603 3.375 g at 04/05/22 0603    albuterol-ipratropium (DUO-NEB) 2.5 MG-0.5 MG/3 ML  3 mL Nebulization Q6H PRN Ludy Soto MD        ferrous sulfate 300 mg (60 mg iron)/5 mL oral syrup 300 mg  300 mg Oral BID Israel Soto MD   300 mg at 04/05/22 0836    acetaminophen (TYLENOL) solution 650 mg  650 mg Per NG tube Q6H PRN Ludy Soto MD   650 mg at 04/05/22 1036        Review of Systems   Unable to perform ROS: Patient nonverbal       Objective     Vital signs for last 24 hours:  Visit Vitals  BP (!) 160/55 (BP 1 Location: Right upper arm, BP Patient Position: At rest;Semi fowlers)   Pulse 74   Temp 99.7 °F (37.6 °C)   Resp 20   Ht 5' 7.99\" (1.727 m)   Wt 80.9 kg (178 lb 5.6 oz)   SpO2 98%   BMI 27.12 kg/m²           Recent Results (from the past 24 hour(s))   GLUCOSE, POC    Collection Time: 04/04/22  5:20 PM   Result Value Ref Range    Glucose (POC) 195 (H) 65 - 117 mg/dL    Performed by Katy Rinne, POC    Collection Time: 04/04/22 10:28 PM   Result Value Ref Range    Glucose (POC) 233 (H) 65 - 117 mg/dL    Performed by [id number], POC    Collection Time: 04/05/22  5:51 AM   Result Value Ref Range Glucose (POC) 299 (H) 65 - 117 mg/dL    Performed by ALYSA KEENE    CBC WITH AUTOMATED DIFF    Collection Time: 04/05/22  7:22 AM   Result Value Ref Range    WBC 16.9 (H) 3.6 - 11.0 K/uL    RBC 2.32 (L) 3.80 - 5.20 M/uL    HGB 6.3 (L) 11.5 - 16.0 g/dL    HCT 21.3 (L) 35.0 - 47.0 %    MCV 91.8 80.0 - 99.0 FL    MCH 27.2 26.0 - 34.0 PG    MCHC 29.6 (L) 30.0 - 36.5 g/dL    RDW 17.7 (H) 11.5 - 14.5 %    PLATELET 689 339 - 821 K/uL    MPV 11.5 8.9 - 12.9 FL    NRBC 0.2 (H) 0.0  WBC    ABSOLUTE NRBC 0.03 (H) 0.00 - 0.01 K/uL    NEUTROPHILS 80 (H) 32 - 75 %    LYMPHOCYTES 12 12 - 49 %    MONOCYTES 5 5 - 13 %    EOSINOPHILS 1 0 - 7 %    BASOPHILS 1 0 - 1 %    IMMATURE GRANULOCYTES 1 (H) 0 - 0.5 %    ABS. NEUTROPHILS 13.6 (H) 1.8 - 8.0 K/UL    ABS. LYMPHOCYTES 2.1 0.8 - 3.5 K/UL    ABS. MONOCYTES 0.9 0.0 - 1.0 K/UL    ABS. EOSINOPHILS 0.2 0.0 - 0.4 K/UL    ABS. BASOPHILS 0.1 0.0 - 0.1 K/UL    ABS. IMM. GRANS. 0.1 (H) 0.00 - 0.04 K/UL    DF AUTOMATED     METABOLIC PANEL, COMPREHENSIVE    Collection Time: 04/05/22  7:22 AM   Result Value Ref Range    Sodium 148 (H) 136 - 145 mmol/L    Potassium 5.0 3.5 - 5.1 mmol/L    Chloride 120 (H) 97 - 108 mmol/L    CO2 22 21 - 32 mmol/L    Anion gap 6 5 - 15 mmol/L    Glucose 332 (H) 65 - 100 mg/dL    BUN 72 (H) 6 - 20 mg/dL    Creatinine 1.71 (H) 0.55 - 1.02 mg/dL    BUN/Creatinine ratio 42 (H) 12 - 20      GFR est AA 35 (L) >60 ml/min/1.73m2    GFR est non-AA 29 (L) >60 ml/min/1.73m2    Calcium 8.0 (L) 8.5 - 10.1 mg/dL    Bilirubin, total 0.4 0.2 - 1.0 mg/dL    AST (SGOT) 142 (H) 15 - 37 U/L    ALT (SGPT) 100 (H) 12 - 78 U/L    Alk.  phosphatase 140 (H) 45 - 117 U/L    Protein, total 6.7 6.4 - 8.2 g/dL    Albumin 1.7 (L) 3.5 - 5.0 g/dL    Globulin 5.0 (H) 2.0 - 4.0 g/dL    A-G Ratio 0.3 (L) 1.1 - 2.2     C REACTIVE PROTEIN, QT    Collection Time: 04/05/22  7:22 AM   Result Value Ref Range    C-Reactive protein 28.40 (H) 0.00 - 0.60 mg/dL   PROCALCITONIN    Collection Time: 04/05/22  7:22 AM   Result Value Ref Range    Procalcitonin 6.93 (H) 0 ng/mL   GLUCOSE, POC    Collection Time: 04/05/22  7:39 AM   Result Value Ref Range    Glucose (POC) 351 (H) 65 - 117 mg/dL    Performed by Alessia Madera    Collection Time: 04/05/22 11:12 AM   Result Value Ref Range    Crossmatch Expiration 04/08/2022,2359     ABO/Rh(D) Saud Shape Positive     Antibody screen Negative     Unit number [id number]     Blood component type  LR     Unit division 00     Status of unit Allocated     TRANSFUSION STATUS Ok to transfuse     Crossmatch result Compatible    GLUCOSE, POC    Collection Time: 04/05/22 11:35 AM   Result Value Ref Range    Glucose (POC) 357 (H) 65 - 117 mg/dL    Performed by Alexis Colunga           Intake/Output Summary (Last 24 hours) at 4/5/2022 1429  Last data filed at 4/5/2022 0605  Gross per 24 hour   Intake --   Output 1700 ml   Net -1700 ml      Current Shift: No intake/output data recorded. Last 3 Shifts: 04/03 1901 - 04/05 0700  In: 2510 [I.V.:2510]  Out: 2600 [Urine:2600]  Physical Exam  Constitutional:       Appearance: She is ill-appearing. HENT:      Head: Normocephalic and atraumatic. Nose: Nose normal.   Cardiovascular:      Rate and Rhythm: Tachycardia present. Pulses: Normal pulses. Abdominal:      General: Abdomen is flat. Skin:     General: Skin is warm and dry.         Her face appears swollen  Gangrene of the big toe-no clear demarcation line  Data Review:   Recent Results (from the past 24 hour(s))   GLUCOSE, POC    Collection Time: 04/04/22  5:20 PM   Result Value Ref Range    Glucose (POC) 195 (H) 65 - 117 mg/dL    Performed by Andres Colorado, POC    Collection Time: 04/04/22 10:28 PM   Result Value Ref Range    Glucose (POC) 233 (H) 65 - 117 mg/dL    Performed by [id number], POC    Collection Time: 04/05/22  5:51 AM   Result Value Ref Range    Glucose (POC) 299 (H) 65 - 117 mg/dL    Performed by Mitzi Canavan CBC WITH AUTOMATED DIFF    Collection Time: 04/05/22  7:22 AM   Result Value Ref Range    WBC 16.9 (H) 3.6 - 11.0 K/uL    RBC 2.32 (L) 3.80 - 5.20 M/uL    HGB 6.3 (L) 11.5 - 16.0 g/dL    HCT 21.3 (L) 35.0 - 47.0 %    MCV 91.8 80.0 - 99.0 FL    MCH 27.2 26.0 - 34.0 PG    MCHC 29.6 (L) 30.0 - 36.5 g/dL    RDW 17.7 (H) 11.5 - 14.5 %    PLATELET 595 146 - 855 K/uL    MPV 11.5 8.9 - 12.9 FL    NRBC 0.2 (H) 0.0  WBC    ABSOLUTE NRBC 0.03 (H) 0.00 - 0.01 K/uL    NEUTROPHILS 80 (H) 32 - 75 %    LYMPHOCYTES 12 12 - 49 %    MONOCYTES 5 5 - 13 %    EOSINOPHILS 1 0 - 7 %    BASOPHILS 1 0 - 1 %    IMMATURE GRANULOCYTES 1 (H) 0 - 0.5 %    ABS. NEUTROPHILS 13.6 (H) 1.8 - 8.0 K/UL    ABS. LYMPHOCYTES 2.1 0.8 - 3.5 K/UL    ABS. MONOCYTES 0.9 0.0 - 1.0 K/UL    ABS. EOSINOPHILS 0.2 0.0 - 0.4 K/UL    ABS. BASOPHILS 0.1 0.0 - 0.1 K/UL    ABS. IMM. GRANS. 0.1 (H) 0.00 - 0.04 K/UL    DF AUTOMATED     METABOLIC PANEL, COMPREHENSIVE    Collection Time: 04/05/22  7:22 AM   Result Value Ref Range    Sodium 148 (H) 136 - 145 mmol/L    Potassium 5.0 3.5 - 5.1 mmol/L    Chloride 120 (H) 97 - 108 mmol/L    CO2 22 21 - 32 mmol/L    Anion gap 6 5 - 15 mmol/L    Glucose 332 (H) 65 - 100 mg/dL    BUN 72 (H) 6 - 20 mg/dL    Creatinine 1.71 (H) 0.55 - 1.02 mg/dL    BUN/Creatinine ratio 42 (H) 12 - 20      GFR est AA 35 (L) >60 ml/min/1.73m2    GFR est non-AA 29 (L) >60 ml/min/1.73m2    Calcium 8.0 (L) 8.5 - 10.1 mg/dL    Bilirubin, total 0.4 0.2 - 1.0 mg/dL    AST (SGOT) 142 (H) 15 - 37 U/L    ALT (SGPT) 100 (H) 12 - 78 U/L    Alk.  phosphatase 140 (H) 45 - 117 U/L    Protein, total 6.7 6.4 - 8.2 g/dL    Albumin 1.7 (L) 3.5 - 5.0 g/dL    Globulin 5.0 (H) 2.0 - 4.0 g/dL    A-G Ratio 0.3 (L) 1.1 - 2.2     C REACTIVE PROTEIN, QT    Collection Time: 04/05/22  7:22 AM   Result Value Ref Range    C-Reactive protein 28.40 (H) 0.00 - 0.60 mg/dL   PROCALCITONIN    Collection Time: 04/05/22  7:22 AM   Result Value Ref Range    Procalcitonin 6.93 (H) 0 ng/mL   GLUCOSE, POC    Collection Time: 04/05/22  7:39 AM   Result Value Ref Range    Glucose (POC) 351 (H) 65 - 117 mg/dL    Performed by Alessia [facility] [address]    Collection Time: 04/05/22 11:12 AM   Result Value Ref Range    Crossmatch Expiration 04/08/2022,2359     ABO/Rh(D) Jeanmarie Jennifer Positive     Antibody screen Negative     Unit number [id number]     Blood component type RC LR     Unit division 00     Status of unit Ko to transfuse     Crossmatch result Compatible    GLUCOSE, POC    Collection Time: 04/05/22 11:35 AM   Result Value Ref Range    Glucose (POC) 357 (H) 65 - 117 mg/dL    Performed by Ewa Bazzi          CT HEAD WO CONT   Final Result   Chronic changes which appear stable. No acute or active intracranial process. Chronic paranasal sinus disease         XR CHEST PORT   Final Result   No acute findings. Patient Active Problem List   Diagnosis Code    HTN (hypertension) I10    Hyperlipidemia E78.5    Neuropathy G62.9    Sciatica M54.30    Diabetes mellitus with neurological manifestations, uncontrolled [id number]    Infection of nailbed of toe of left foot L03.032    PAD [facility]peripheral artery disease) ([facility]) I73.9    Hypercalcemia E83.52    DM type 2 causing vascular disease ([address] E11.59    Type 2 diabetes mellitus with nephropathy ([address] E11.21    Acute osteomyelitis of ankle or foot ([address] M86.179    Diabetic peripheral neuropathy ([address] E11.42    Spinal stenosis in cervical region M48.02    Ischemia of both lower extremities I99.8    Pain in both lower legs M79.661, M79.662    CVA (cerebral vascular accident) ([address] I63.9    Elevated troponin R77.8    Hyperkalemia E87.5    UTI (urinary tract infection) N39.0    Sepsis ([facility]) A41.9    RICARDO (acute kidney injury) ([address] N17.9    AMS (altered mental status) R41.82        DIAGNOSES:  1. Hypernatremia, was after correction of serum glucose  2.  UTI/sepsis on Zosyn and vancomycin  3. Gangrene of left great toe  4. Acute kidney injury on chronic kidney disease  5. Encephalopathy  6. Acute on chronic anemia  7. History of CVA  8. Diabetes mellitus with complications  9. Peripheral arterial disease  DISCUSSION:   Agree with need of transfusion   Unfortunately leukocytosis is not improved; continue to monitor    PLAN:   Can continue with half-normal saline   Agree with adjusting insulin for management of hypoglycemia   Renal function to be monitored        Thanks for consulting me. Please don't hesitate to contact me if any questions arise of if I can assist in any manner. This dictation was done by dragon, computer voice recognition software. Often unanticipated grammatical, syntax, phones and other interpretive errors are inadvertently transcribed. Please excuse errors that have escaped final proofreading. Please contact me if you suspect dictation or transcription errors.   Dr Bel Christiansen  [address] [place] [address], [facility], [facility]  Cell Phone: [phone]  Office phone: [phone]  Fax: [phone]

## 2022-04-05 NOTE — PROGRESS NOTES
Rounded on pt, morning meds given, hung new bag IV fluids, emptied giraldo, pt resting comfortably, call bell in reach.

## 2022-04-05 NOTE — WOUND CARE
IP WOUND CONSULT    Chele Ng  MEDICAL RECORD NUMBER:  [id number]  AGE: 78 y.o. GENDER: female  : 1945  TODAY'S DATE:  2022    GENERAL     [] Follow-up   [x] New Consult    Chele Ng is a 78 y.o. female referred by:   [x] Physician  [] Nursing  [] Other:         PAST MEDICAL HISTORY    Past Medical History:   Diagnosis Date    [facility]     HTN (hypertension)     Hyperlipidemia     Neuropathy     [facility]AD (peripheral artery disease) ([facility]     PCI    Sciatica         PAST SURGICAL HISTORY    Past Surgical History:   Procedure Laterality Date    HX AMPUTATION Right     great toe    HX CERVICAL FUSION      HX OTHER SURGICAL Bilateral     Stent placement    HX OTHER SURGICAL      HX VASCULAR STENT Bilateral     2 in right 1 in left    HX VASCULAR STENT Bilateral        FAMILY HISTORY    Family History   Problem Relation Age of Onset    Cancer Mother     Diabetes Mother     Hypertension Mother          ALLERGIES    No Known Allergies    MEDICATIONS    No current facility-administered medications on file prior to encounter. Current Outpatient Medications on File Prior to Encounter   Medication Sig Dispense Refill    gabapentin (NEURONTIN) 300 mg capsule Take 1 Capsule by mouth two (2) times a day. Max Daily Amount: 600 mg. 12 Capsule 0    insulin glargine (LANTUS) 100 unit/mL injection 50 Units by SubCUTAneous route daily. 10 mL 0    acidophilus-pectin, citrus 25 million cell -100 mg tab tablet Take 2 Tablets by mouth daily. 30 Tablet 0    amLODIPine (NORVASC) 5 mg tablet Take 1 Tablet by mouth daily. 30 Tablet 0    artificial saliva (MOUTH KOTE) spra Take 1 Spray by mouth three (3) times daily. 10 mL 0    latanoprost (XALATAN) 0.005 % ophthalmic solution Administer 1 Drop to right eye every evening. 10 mL 0    lidocaine (XYLOCAINE) 4 % (40 mg/mL) topical solution Apply 1 mL to affected area as needed for Pain.  1 Each 0    metoprolol succinate (TOPROL-XL) 50 mg XL tablet Take 1 Tablet by mouth two (2) times a day. 60 Tablet 0    atorvastatin (LIPITOR) 20 mg tablet TAKE 1 TABLET BY MOUTH EVERY NIGHT 30 Tablet 5    brimonidine (ALPHAGAN) 0.2 % ophthalmic solution Administer 1 Drop to both eyes three (3) times daily.  aspirin delayed-release 81 mg tablet Take  by mouth daily.  ferrous sulfate 325 mg (65 mg iron) tablet Take 1 Tab by mouth two (2) times a day. 60 Tab 5         [unfilled]  Visit Vitals  BP (!) 156/57 (BP 1 Location: Right upper arm, BP Patient Position: At rest;Semi fowlers)   Pulse 74   Temp 99.2 °F (37.3 °C)   Resp 20   Ht 5' 7.99\" (1.727 m)   Wt 80.9 kg (178 lb 5.6 oz)   SpO2 98%   BMI 27.12 kg/m²       ASSESSMENT     Wound Identification & Type: Stage 2 PI to sacrum/coccyx and buttocks; DU to left heel; sDTI to right heel; gangrene of right 1st toe  Dressing change: Yes, see flow chart  Verbal consent for picture: Non-verbal at this time    Contributing Factors: anticoagulation therapy, diabetes, poor glucose control, chronic pressure, decreased mobility, shear force, incontinence of stool, incontinence of urine, decreased tissue oxygenation and Hx of CVA with right sided weakness    Wound Toe (Comment  which one) Anterior;Right Greater toe amputated 02/22/22 (Active)   Wound Etiology Surgical 04/04/22 0345   Dressing Status Clean;Dry 04/04/22 0345   Cleansed Not cleansed 04/04/22 0345   Dressing/Treatment Open to air 04/04/22 0345   Wound Assessment Dry 04/04/22 0345   Number of days: 42       Wound Buttocks 02/23/22 (Active)   Wound Etiology Pressure Unstageable 04/04/22 0345   Dressing Status Breakthrough drainage noted 04/04/22 0345   Cleansed Cleansed with saline 04/04/22 0345   Dressing/Treatment Foam 04/04/22 0345   Wound Assessment Fluid filled blister; Other (Comment) 04/04/22 0345   Drainage Amount Moderate 04/04/22 0345   Drainage Description Serous;Purulent 04/04/22 0345   Wound Odor Mild;Malodorous/Putrid 04/04/22 0345 Licha-Wound/Incision Assessment Non-Blanchable erythema 04/04/22 0345   Edges Undefined edges 04/04/22 0345   Number of days: 41       Wound Heel Left dark area (Active)   Wound Image   04/05/22 1054   Wound Etiology Diabetic 04/05/22 1054   Dressing Status Clean;Dry 04/04/22 0345   Cleansed Other (Comment) 04/05/22 1054   Dressing/Treatment Open to air 04/05/22 1054   Offloading for Diabetic Foot Ulcers Offloading boot 04/05/22 1054   Wound Length (cm) 8 cm 04/05/22 1054   Wound Width (cm) 7 cm 04/05/22 1054   Wound Depth (cm) 0 cm 04/05/22 1054   Wound Surface Area (cm^2) 56 cm^2 04/05/22 1054   Wound Volume (cm^3) 0 cm^3 04/05/22 1054   Wound Assessment Dry;Eschar dry 04/05/22 1054   Drainage Amount None 04/05/22 1054   Wound Odor None 04/05/22 1054   Licha-Wound/Incision Assessment Dry/flaky 04/05/22 1054   Edges Attached edges 04/05/22 1054   Number of days: 39       Wound Toe (Comment  which one) Anterior; Left black left great toe 03/03/22 (Active)   Wound Image    04/05/22 1043   Wound Etiology Other (Comment) 04/05/22 1043   Dressing Status Dry 04/04/22 0345   Cleansed Cleansed with saline 04/04/22 0345   Dressing/Treatment Open to air 04/05/22 1043   Wound Assessment Other (Comment) 04/05/22 1043   Drainage Amount None 04/05/22 1043   Wound Odor None 04/05/22 1043   Licha-Wound/Incision Assessment Other (Comment) 04/05/22 1043   Number of days: 33       Wound Coccyx Partial Thickness 03/09/22 (Active)   Wound Image   04/05/22 1102   Wound Etiology Pressure Stage 2 04/05/22 1102   Dressing Status New dressing applied 04/05/22 1102   Cleansed Other (Comment) 04/05/22 1102   Dressing/Treatment Honey gel/honey paste; Foam 04/05/22 1102   Dressing Change Due 04/06/22 04/05/22 1102   Wound Length (cm) 11.5 cm 04/05/22 1102   Wound Width (cm) 11 cm 04/05/22 1102   Wound Depth (cm) 0.1 cm 04/05/22 1102   Wound Surface Area (cm^2) 126.5 cm^2 04/05/22 1102   Change in Wound Size % -1334.24 04/05/22 1102   Wound Volume (cm^3) 12.65 cm^3 04/05/22 1102   Wound Healing % -617 04/05/22 1102   Wound Assessment Pink/red;Non-blanchable erythema;Dry;Denuded 04/05/22 1102   Drainage Amount None 04/05/22 1102   Wound Odor None 04/05/22 1102   Licha-Wound/Incision Assessment Non-Blanchable erythema;Fragile;Denuded 04/05/22 1102   Edges Undefined edges 04/05/22 1102   Wound Thickness Description Partial thickness 04/05/22 1102   Number of days: 27       Wound Leg upper Proximal;Posterior; Left Excoriated skin under the left posterior buttock 03/13/22 (Active)   Number of days: 23       Wound Heel Right Non-blanchable Erythema 04/05/22 (Active)   Wound Image   04/05/22 1059   Wound Etiology Deep Tissue/Injury 04/05/22 1059   Cleansed Other (Comment) 04/05/22 1059   Dressing/Treatment Open to air 04/05/22 1059   Offloading for Diabetic Foot Ulcers Offloading boot 04/05/22 1059   Wound Assessment Purple/maroon;Non-blanchable erythema 04/05/22 1059   Wound Odor None 04/05/22 1059   Licha-Wound/Incision Assessment Dry/flaky 04/05/22 1059   Edges Attached edges 04/05/22 1059   Number of days: 0       [REMOVED] Wound Toe (Comment  which one) Anterior; Left Toe nail is greenish yellow with serosanguineous fluid 02/22/22 (Removed)   Number of days: 41          PLAN     Skin Care & Pressure Relief Recommendations  Speciality bed Envella Air Fluidized Bed  Minimize layers of linen  Turn/reposition approximately every 2 hours  Pillow wedges  Manage incontinence   Promote continence; Skin Protective lotion/cream to buttocks and sacrum daily and as needed with incontinence care  Offloading boots    Shakir 11  Blood Glucose: 351 on 4/5/22                             Albumin: 1.7 on 4/5/22  WBCs:  16.9 on 4/5/22    Support Surface: currently on foam mattress. Ordered a Envella Air-Fluidized bed from [facility], confirmation [id number]. Bed should deliver today.       Physician/Provider notified: Dr. Eda Miller and Dr. Jabier Elena  Recommendations: Stage 2 PIs noted to coccyx and buttocks and with large area of non-blanchable erythema. Wounds to coccyx and buttocks have deteriorated compared to previous admission assessment on 3/14/22. Inner buttocks are non-blanchable and skin is fragile and loose. Skin will likely continue to open and peel away if does not turn to eschar. Area to left buttock and coccyx may turn to eschar as it continues to evolve. Daughter, Clarence Harper, present during examination and would evolution (skin may continue to open and peel away) explained to her. Apply Therahoney gel and cover with foam dressing daily, see dressing order. Informed Dr. Chava Anderson and requested Dr. Charlotte Fleming examine area to confirm if debridement is necessary at this time. I did not feel any fluctuant areas. I informed Dr. Charlotte Fleming and he will assess area when he sees patient for right 1st toe. Gangrene of right 1st toe has increased in surface area compared to previous admission. Left heel has diabetic ulcer of dry black toughened skin, more so than on previous admission. sDTI noted to right heel. Apply zinc paste every other day to right heel and cover with dry dressing, see dressing order. Maintain heel boots on both feet for offloading of the heels and to prevent further pressure related skin injury. Patient has indwelling giraldo catheter to manage  incontinence. Use foam wedge to turn q2h at 30 degree angle or more to offload sacrum. Informed CNA and Aran Ban that for sacrum to be offloaded it should not rest on the bed surface. Maintain HOB at 30 degrees or less, if not contraindicated, to reduce pressure to buttocks and sacrum. Raise foot of bed to help prevent friction and shear injury from sliding down in the bed. Patient repositioned on right side using wedge, I was able to slide my hand between her sacrum and bed surface. Incontinent care for soft BM provided and documented in flow chart. Will continue to follow.                  Discharge Wound Care Needs: TBD    Teaching completed with: Misha Abler, daughter  [] Patient           [x] Family member       [] Caregiver          [] Nursing  [] Other    Patient/Caregiver Teaching: Non-blanchable skin to sacrum/coccyx and buttocks may continue to open and peel away as pressure injury evolves.     Level of patient/caregiver understanding able to:   [] Indicates understanding       [] Needs reinforcement  [] Unsuccessful      [x] Verbal Understanding  [] Demonstrated understanding       [] No evidence of learning  [] Refused teaching         [] N/A       Electronically signed by Apolinar Bettencourt RN on 4/5/2022 at 11:16 AM

## 2022-04-05 NOTE — ROUTINE PROCESS
Chantal Khan RN and I assessed pt for PRN Duoneb necessity. Pt had clear breath sounds (abnormal sounds were all tracheal, airways were clear), saturations of 98%, and was in no distress. Pt did not qualify for PRN breathing treatment.

## 2022-04-05 NOTE — PROGRESS NOTES
Patient had BM. Patient cleaned and dressing changed on sacrum/coccyx area by this nurse and Kevon Larsen LPN.     Signed By: Lorenza Mcdowell RN     April 5, 2022

## 2022-04-06 NOTE — PROGRESS NOTES
PROGRESS NOTE    Patient: Neena Basilio MRN: [id number]  SSN: [id number]    YOB: 1945  Age: 78 y.o. Sex: female      Admit Date: 4/3/2022    LOS: 2 days       Subjective     Chief Complaint   Patient presents with    Altered mental status       HPI: Patient is a 78y.o. 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 04/05   Patient is seen at bedside with daughter and nephew today. Patient is in distress due to pain and daughter notes that patient gets Neurontin TID for neuropathy daily. Otherwise, patient is still receiving enteric feeding through PEG tube and getting IVF. Patient was seen by nephrology yesterday. Recommends obtaining renal panel and continuing IVF. Patient was seen by ID yesterday. Recommends continuing IV vancomycin for urosepsis. Patient was seen by pulmonology today. Recommends PRBC transfusion. Labs indicate WBC 16.9, Hgb 6.3, Na 148, Cl 120, BUN 72, Cr 1.71, Ca 8, CRP 28.4, pro-carrie 6.93 and .       04/06   Patient is seen at bedside. Patient received 2 units of PRBC. Patient is on Zosyn. No new changes today. Patient was seen by pulmonology today. Recommends continuing zosyn vancomycin and start on IVF. Labs show increasing Hgb of 9, decreasing WBC 14.7, Na 147, , BUN 55,   Cr 1.47, and CRP 21.4. CXR is unremarkable. ROS  Unable to obtain.      Objective     Visit Vitals  BP (!) 158/67 (BP 1 Location: Right upper arm, BP Patient Position: At rest)   Pulse 85   Temp (!) 101 °F (38.3 °C)   Resp 20   Ht 5' 7.99\" (1.727 m)   Wt 178 lb 5.6 oz (80.9 kg)   SpO2 97%   BMI 27.12 kg/m²    O2 Flow Rate (L/min): 2 l/min O2 Device: None (Room air)    Physical Exam:   Physical Exam  Constitutional: Open eyes do not follow any,. HENT:   Head: Normocephalic and atraumatic. Eyes: Pupils are equal, round, and reactive to light. EOM are normal.   Cardiovascular: Normal rate, regular rhythm and normal heart sounds. Pulmonary/Chest: Breath sounds normal. No wheezes. No rales. Exhibits no tenderness. Abdominal: Soft. Bowel sounds are normal. There is no abdominal tenderness. There is no rebound and no guarding. Musculoskeletal: Normal range of motion. Neurological: Open eyes do not follow any, right-sided weakness  Intake & Output:  Current Shift: No intake/output data recorded. Last three shifts: 04/04 1901 - 04/06 0700  In: 2471   Out: 2700 [Urine:2700]    Lab/Data Review: All lab results for the last 24 hours reviewed.        24 Hour Results:    Recent Results (from the past 24 hour(s))   TYPE & SCREEN    Collection Time: 04/05/22 11:12 AM   Result Value Ref Range    Crossmatch Expiration 04/08/2022,2359     ABO/Rh(D) Phillip Zaldivar Positive     Antibody screen Negative     Unit number [id number]     Blood component type  LR     Unit division 00     Status of unit Issued,final     TRANSFUSION STATUS Ok to transfuse     Crossmatch result Compatible     Unit number [id number]     Blood component type RC LR     Unit division 00     Status of unit [place] to transfuse     Crossmatch result Compatible     Unit number [id number]     Blood component type  LR,1     Unit division 00     Status of unit [address] to transfuse     Crossmatch result Compatible    GLUCOSE, POC    Collection Time: 04/05/22 11:35 AM   Result Value Ref Range    Glucose (POC) 357 (H) 65 - 117 mg/dL    Performed by Lacey Adame, POC    Collection Time: 04/05/22  3:02 PM   Result Value Ref Range    Glucose (POC) 316 (H) 65 - 117 mg/dL    Performed by Lacey Adame, POC    Collection Time: 04/05/22  5:42 PM   Result Value Ref Range    Glucose (POC) 356 (H) 65 - 117 mg/dL    Performed by Hina Orozco, POC    Collection Time: 04/05/22  8:24 PM   Result Value Ref Range    Glucose (POC) 347 (H) 65 - 117 mg/dL    Performed by Alyson Bartholomew    GLUCOSE, POC    Collection Time: 04/05/22 11:11 PM   Result Value Ref Range    Glucose (POC) 307 (H) 65 - 117 mg/dL    Performed by Alyson Bartholomew    GLUCOSE, POC    Collection Time: 04/06/22  2:25 AM   Result Value Ref Range    Glucose (POC) 330 (H) 65 - 117 mg/dL    Performed by Alyson Bartholomew    GLUCOSE, POC    Collection Time: 04/06/22  6:53 AM   Result Value Ref Range    Glucose (POC) 326 (H) 65 - 117 mg/dL    Performed by Alyson Bartholomew          Imaging:    CT HEAD WO CONT   Final Result   Chronic changes which appear stable. No acute or active intracranial process. Chronic paranasal sinus disease         XR CHEST PORT   Final Result   No acute findings. Assessment     Severe sepsis with UTI  UTI  Acute on chronic kidney disease  Hyperkalemia  CVA with right-sided weakness  Anemia of chronic disease  Gangrene of the left great toe  Sacral decubitus ulcer    Plan    IV Zosyn   sliding scale insulin  Aspirin 81 mg daily  Lipitor 20 mg daily  Alphagan 0.2% 3 times a day  Ferrous sulfate 325 mg twice a day  Heparin 5000 units subcu every 8 hours  Half-normal saline at 100 cc/h  Duo-neb 2.5 mg-0.5 mg PRN  Daily renal panel per nephrology recommendation. Full code. Start on IVF.   Pulmonary nephrology infectious disease and vascular surgical.      Current Facility-Administered Medications:     0.9% sodium chloride infusion 250 mL, 250 mL, IntraVENous, PRN, Martínez Crespo MD    gabapentin (NEURONTIN) capsule 300 mg, 300 mg, Oral, TID, Israel Soto MD, 300 mg at 04/06/22 0827    0.9% sodium chloride infusion 250 mL, 250 mL, IntraVENous, PRN, Catrina Soto MD    insulin lispro (HUMALOG) injection, , SubCUTAneous, Q6H, Israel Soto MD, 5 Units at 04/06/22 0703    insulin glargine (LANTUS) injection 22 Units, 22 Units, SubCUTAneous, DAILY, Israel Soto MD, 22 Units at 04/06/22 0827    atorvastatin (LIPITOR) tablet 20 mg, 20 mg, Oral, QHS, Israel Soto MD, 20 mg at 04/05/22 2359    latanoprost (XALATAN) 0.005 % ophthalmic solution 1 Drop, 1 Drop, Right Eye, QPM, Sera Posada MD, 1 Drop at 04/05/22 1751    aspirin delayed-release tablet 81 mg, 81 mg, Oral, DAILY, Israel Soto MD, 81 mg at 04/06/22 0827    brimonidine (ALPHAGAN) 0.2 % ophthalmic solution 1 Drop, 1 Drop, Both Eyes, TID, Israel Soto MD, 1 Drop at 04/06/22 0829    glucose chewable tablet 16 g, 4 Tablet, Oral, PRN, Catrina Soto MD    glucagon (GLUCAGEN) injection 1 mg, 1 mg, IntraMUSCular, PRN, Sera Posada MD  Jeff Safe  [DISCONTINUED] acetaminophen (TYLENOL) tablet 650 mg, 650 mg, Oral, Q6H PRN, 650 mg at 04/04/22 2246 **OR** acetaminophen (TYLENOL) suppository 650 mg, 650 mg, Rectal, Q6H PRN, Catrina Soto MD    polyethylene glycol (MIRALAX) packet 17 g, 17 g, Oral, DAILY PRN, Catrina Soto MD    ondansetron (ZOFRAN ODT) tablet 4 mg, 4 mg, Oral, Q8H PRN **OR** ondansetron (ZOFRAN) injection 4 mg, 4 mg, IntraVENous, Q6H PRN, Israel Soto MD    heparin (porcine) injection 5,000 Units, 5,000 Units, SubCUTAneous, Q8H, Israel Soto MD, 5,000 Units at 04/06/22 0703    0.45% sodium chloride infusion, 100 mL/hr, IntraVENous, CONTINUOUS, Israel Soto MD, Last Rate: 100 mL/hr at 04/05/22 0555, 100 mL/hr at 04/05/22 0555    piperacillin-tazobactam (ZOSYN) 3.375 g in 0.9% sodium chloride (MBP/ADV) 100 mL MBP, 3.375 g, IntraVENous, Q8H, Israel Soto MD, Last Rate: 25 mL/hr at 04/06/22 0827, 3.375 g at 04/06/22 0827    albuterol-ipratropium (DUO-NEB) 2.5 MG-0.5 MG/3 ML, 3 mL, Nebulization, Q6H PRN, Ludy Soto MD    ferrous sulfate 300 mg (60 mg iron)/5 mL oral syrup 300 mg, 300 mg, Oral, BID, Ludy Soto MD, 300 mg at 04/06/22 0827    acetaminophen (TYLENOL) solution 650 mg, 650 mg, Per NG tube, Q6H PRN, Israel Soto MD, 650 mg at 04/06/22 0703    Current Outpatient Medications   Medication Instructions    acidophilus-pectin, citrus 25 million cell -100 mg tab tablet 2 Tablets, Oral, DAILY    amLODIPine (NORVASC) 5 mg, Oral, DAILY    artificial saliva (MOUTH KOTE) spra 1 Spray, Oral, 3 TIMES DAILY    aspirin delayed-release 81 mg tablet Oral, DAILY    atorvastatin (LIPITOR) 20 mg tablet TAKE 1 TABLET BY MOUTH EVERY NIGHT    brimonidine (ALPHAGAN) 0.2 % ophthalmic solution 1 Drop, Both Eyes, 3 TIMES DAILY    ferrous sulfate 325 mg, Oral, 2 TIMES DAILY    gabapentin (NEURONTIN) 300 mg, Oral, 2 TIMES DAILY    insulin glargine (LANTUS) 50 Units, SubCUTAneous, DAILY    latanoprost (XALATAN) 0.005 % ophthalmic solution 1 Drop, Right Eye, EVERY EVENING    lidocaine (XYLOCAINE) 4 % (40 mg/mL) topical solution 1 mL, Topical, AS NEEDED    metoprolol succinate (TOPROL-XL) 50 mg, Oral, 2 TIMES DAILY         Signed By: Lisandra Garnica     April 6, 2022

## 2022-04-06 NOTE — PROGRESS NOTES
Consult Date: 4/6/2022      Subjective   HISTORY OF PRESENTING ILLNESS   Patient was seen and examined. Not verbal to me. Resting in bed.   Tube feeds are started with loose bowel movements    Past Medical History:   Diagnosis Date    Diabetes mellitus ([address]     HTN (hypertension)     Hyperlipidemia     Neuropathy     PAD (peripheral artery disease) (HCC)     PCI    Sciatica       Past Surgical History:   Procedure Laterality Date    HX AMPUTATION Right     great toe    HX CERVICAL FUSION      HX OTHER SURGICAL Bilateral     Stent placement    HX OTHER SURGICAL      HX VASCULAR STENT Bilateral     2 in right 1 in left    HX VASCULAR STENT Bilateral      Family History   Problem Relation Age of Onset    Cancer Mother     Diabetes Mother     Hypertension Mother       Social History     Tobacco Use    Smoking status: Never Smoker    Smokeless tobacco: Never Used   Substance Use Topics    Alcohol use: Not Currently       Current Facility-Administered Medications   Medication Dose Route Frequency Provider Last Rate Last Admin    albuterol-ipratropium (DUO-NEB) 2.5 MG-0.5 MG/3 ML  3 mL Nebulization Q6HWA RT Martínez Crespo MD   3 mL at 04/06/22 1301    0.9% sodium chloride infusion 250 mL  250 mL IntraVENous PRN Joaquin Crespo MD        gabapentin (NEURONTIN) capsule 300 mg  300 mg Oral TID Israel Soto MD   300 mg at 04/06/22 1534    0.9% sodium chloride infusion 250 mL  250 mL IntraVENous PRN Yun Soto MD        insulin lispro (HUMALOG) injection   SubCUTAneous Q6H Israel Soto MD   5 Units at 04/06/22 0703    insulin glargine (LANTUS) injection 22 Units  22 Units SubCUTAneous DAILY Israel Soto MD   22 Units at 04/06/22 0827    atorvastatin (LIPITOR) tablet 20 mg  20 mg Oral QHS Israel Soto MD   20 mg at 04/05/22 6301    latanoprost (XALATAN) 0.005 % ophthalmic solution 1 Drop  1 Drop Right Eye QPM Israel Soto MD   1 Drop at 04/05/22 1750    aspirin delayed-release tablet 81 mg  81 mg Oral DAILY Israle Soto MD   81 mg at 04/06/22 0827    brimonidine (ALPHAGAN) 0.2 % ophthalmic solution 1 Drop  1 Drop Both Eyes TID Israel Soto MD   1 Drop at 04/06/22 1534    glucose chewable tablet 16 g  4 Tablet Oral PRN Lynne Soto MD        glucagon (GLUCAGEN) injection 1 mg  1 mg IntraMUSCular PRN Lynne Soto MD       Wilhelminia Blazing acetaminophen (TYLENOL) suppository 650 mg  650 mg Rectal Q6H PRN Lynne Soto MD        polyethylene glycol (MIRALAX) packet 17 g  17 g Oral DAILY PRN Lynne Soto MD        ondansetron (ZOFRAN ODT) tablet 4 mg  4 mg Oral Q8H PRN Lynne Soto MD        Or    [facility] injection 4 mg  4 mg IntraVENous Q6H PRN Lynne Soto MD        heparin (porcine) injection 5,000 Units  5,000 Units SubCUTAneous Rojelio Shetty MD   5,000 Units at 04/06/22 1534    0.45% sodium chloride infusion  100 mL/hr IntraVENous CONTINUOUS Lynne Soto  mL/hr at 04/05/22 0555 100 mL/hr at 04/05/22 0555    piperacillin-tazobactam (ZOSYN) 3.375 g in 0.9% sodium chloride (MBP/ADV) 100 mL MBP  3.375 g IntraVENous Q8H Israel Soto MD 25 mL/hr at 04/06/22 1534 3.375 g at 04/06/22 1534    albuterol-ipratropium (DUO-NEB) 2.5 MG-0.5 MG/3 ML  3 mL Nebulization Q6H PRN Lynne Soto MD        ferrous sulfate 300 mg (60 mg iron)/5 mL oral syrup 300 mg  300 mg Oral BID Israel Soto MD   300 mg at 04/06/22 0827    acetaminophen (TYLENOL) solution 650 mg  650 mg Per NG tube Q6H PRN Lynne Soto MD   650 mg at 04/06/22 0703        Review of Systems   Unable to perform ROS: Patient nonverbal       Objective     Vital signs for last 24 hours:  Visit Vitals  BP (!) 180/77   Pulse 83   Temp 99.2 °F (37.3 °C)   Resp 21   Ht 5' 7.99\" (1.727 m)   Wt 80.9 kg (178 lb 5.6 oz)   SpO2 97%   BMI 27.12 kg/m²           Recent Results (from the past 24 hour(s))   GLUCOSE, POC    Collection Time: 04/05/22  5:42 PM Result Value Ref Range    Glucose (POC) 356 (H) 65 - 117 mg/dL    Performed by Ema Johnson    GLUCOSE, POC    Collection Time: 04/05/22  8:24 PM   Result Value Ref Range    Glucose (POC) 347 (H) 65 - 117 mg/dL    Performed by Tatiana Jaime    GLUCOSE, POC    Collection Time: 04/05/22 11:11 PM   Result Value Ref Range    Glucose (POC) 307 (H) 65 - 117 mg/dL    Performed by Tatiana Sieving    GLUCOSE, POC    Collection Time: 04/06/22  2:25 AM   Result Value Ref Range    Glucose (POC) 330 (H) 65 - 117 mg/dL    Performed by Tatiana Sieving    GLUCOSE, POC    Collection Time: 04/06/22  6:53 AM   Result Value Ref Range    Glucose (POC) 326 (H) 65 - 117 mg/dL    Performed by Tatiana Jaime    C REACTIVE PROTEIN, QT    Collection Time: 04/06/22 11:28 AM   Result Value Ref Range    C-Reactive protein 21.40 (H) 0.00 - 0.60 mg/dL   CBC WITH AUTOMATED DIFF    Collection Time: 04/06/22 11:28 AM   Result Value Ref Range    WBC 14.7 (H) 3.6 - 11.0 K/uL    RBC 3.24 (L) 3.80 - 5.20 M/uL    HGB 9.0 (L) 11.5 - 16.0 g/dL    HCT 28.6 (L) 35.0 - 47.0 %    MCV 88.3 80.0 - 99.0 FL    MCH 27.8 26.0 - 34.0 PG    MCHC 31.5 30.0 - 36.5 g/dL    RDW 17.1 (H) 11.5 - 14.5 %    PLATELET 190 512 - 002 K/uL    MPV 11.2 8.9 - 12.9 FL    NRBC 0.5 (H) 0.0  WBC    ABSOLUTE NRBC 0.07 (H) 0.00 - 0.01 K/uL    NEUTROPHILS 76 (H) 32 - 75 %    LYMPHOCYTES 16 12 - 49 %    MONOCYTES 4 (L) 5 - 13 %    EOSINOPHILS 2 0 - 7 %    BASOPHILS 1 0 - 1 %    IMMATURE GRANULOCYTES 1 (H) 0 - 0.5 %    ABS. NEUTROPHILS 11.4 (H) 1.8 - 8.0 K/UL    ABS. LYMPHOCYTES 2.3 0.8 - 3.5 K/UL    ABS. MONOCYTES 0.6 0.0 - 1.0 K/UL    ABS. EOSINOPHILS 0.2 0.0 - 0.4 K/UL    ABS. BASOPHILS 0.1 0.0 - 0.1 K/UL    ABS. IMM.  GRANS. 0.1 (H) 0.00 - 0.04 K/UL    DF AUTOMATED     PROCALCITONIN    Collection Time: 04/06/22 11:28 AM   Result Value Ref Range    Procalcitonin 4.78 (H) 0 ng/mL   METABOLIC PANEL, COMPREHENSIVE    Collection Time: 04/06/22 11:28 AM   Result Value Ref Range    Sodium 147 (H) 136 - 145 mmol/L    Potassium 4.8 3.5 - 5.1 mmol/L    Chloride 119 (H) 97 - 108 mmol/L    CO2 23 21 - 32 mmol/L    Anion gap 5 5 - 15 mmol/L    Glucose 353 (H) 65 - 100 mg/dL    BUN 55 (H) 6 - 20 mg/dL    Creatinine 1.47 (H) 0.55 - 1.02 mg/dL    BUN/Creatinine ratio 37 (H) 12 - 20      GFR est AA 42 (L) >60 ml/min/1.73m2    GFR est non-AA 35 (L) >60 ml/min/1.73m2    Calcium 8.4 (L) 8.5 - 10.1 mg/dL    Bilirubin, total 0.5 0.2 - 1.0 mg/dL    AST (SGOT) 88 (H) 15 - 37 U/L    ALT (SGPT) 78 12 - 78 U/L    Alk. phosphatase 145 (H) 45 - 117 U/L    Protein, total 6.8 6.4 - 8.2 g/dL    Albumin 1.7 (L) 3.5 - 5.0 g/dL    Globulin 5.1 (H) 2.0 - 4.0 g/dL    A-G Ratio 0.3 (L) 1.1 - 2.2     GLUCOSE, POC    Collection Time: 04/06/22 11:31 AM   Result Value Ref Range    Glucose (POC) 354 (H) 65 - 117 mg/dL    Performed by Nai Gutierrez    GLUCOSE, POC    Collection Time: 04/06/22  2:33 PM   Result Value Ref Range    Glucose (POC) 343 (H) 65 - 117 mg/dL    Performed by [address] [place] [address], POC    Collection Time: 04/06/22  4:49 PM   Result Value Ref Range    Glucose (POC) 295 (H) 65 - 117 mg/dL    Performed by Nai Gutierrez           Intake/Output Summary (Last 24 hours) at 4/6/2022 1707  Last data filed at 4/6/2022 1634  Gross per 24 hour   Intake 3399.8 ml   Output 3550 ml   Net -150.2 ml      Current Shift: 04/06 0701 - 04/06 1900  In: 928.8   Out: 2550 [Urine:2550]  Last 3 Shifts: 04/04 1901 - 04/06 0700  In: 2471   Out: 2700 [Urine:2700]  Physical Exam  Constitutional:       Appearance: She is ill-appearing. HENT:      Head: Normocephalic and atraumatic. Nose: Nose normal.   Cardiovascular:      Rate and Rhythm: Tachycardia present. Pulses: Normal pulses. Abdominal:      General: Abdomen is flat. Skin:     General: Skin is warm and dry.         Her face appears swollen  Gangrene of the big toe-no clear demarcation line  Data Review:   Recent Results (from the past 24 hour(s))   GLUCOSE, POC Collection Time: 04/05/22  5:42 PM   Result Value Ref Range    Glucose (POC) 356 (H) 65 - 117 mg/dL    Performed by Devonte Gomes    GLUCOSE, POC    Collection Time: 04/05/22  8:24 PM   Result Value Ref Range    Glucose (POC) 347 (H) 65 - 117 mg/dL    Performed by Tyesha Arnold    GLUCOSE, POC    Collection Time: 04/05/22 11:11 PM   Result Value Ref Range    Glucose (POC) 307 (H) 65 - 117 mg/dL    Performed by Tyesha Arnold    GLUCOSE, POC    Collection Time: 04/06/22  2:25 AM   Result Value Ref Range    Glucose (POC) 330 (H) 65 - 117 mg/dL    Performed by Tyesha Arnold    GLUCOSE, POC    Collection Time: 04/06/22  6:53 AM   Result Value Ref Range    Glucose (POC) 326 (H) 65 - 117 mg/dL    Performed by Tyesha Arnold    C REACTIVE PROTEIN, QT    Collection Time: 04/06/22 11:28 AM   Result Value Ref Range    C-Reactive protein 21.40 (H) 0.00 - 0.60 mg/dL   CBC WITH AUTOMATED DIFF    Collection Time: 04/06/22 11:28 AM   Result Value Ref Range    WBC 14.7 (H) 3.6 - 11.0 K/uL    RBC 3.24 (L) 3.80 - 5.20 M/uL    HGB 9.0 (L) 11.5 - 16.0 g/dL    HCT 28.6 (L) 35.0 - 47.0 %    MCV 88.3 80.0 - 99.0 FL    MCH 27.8 26.0 - 34.0 PG    MCHC 31.5 30.0 - 36.5 g/dL    RDW 17.1 (H) 11.5 - 14.5 %    PLATELET 257 244 - 420 K/uL    MPV 11.2 8.9 - 12.9 FL    NRBC 0.5 (H) 0.0  WBC    ABSOLUTE NRBC 0.07 (H) 0.00 - 0.01 K/uL    NEUTROPHILS 76 (H) 32 - 75 %    LYMPHOCYTES 16 12 - 49 %    MONOCYTES 4 (L) 5 - 13 %    EOSINOPHILS 2 0 - 7 %    BASOPHILS 1 0 - 1 %    IMMATURE GRANULOCYTES 1 (H) 0 - 0.5 %    ABS. NEUTROPHILS 11.4 (H) 1.8 - 8.0 K/UL    ABS. LYMPHOCYTES 2.3 0.8 - 3.5 K/UL    ABS. MONOCYTES 0.6 0.0 - 1.0 K/UL    ABS. EOSINOPHILS 0.2 0.0 - 0.4 K/UL    ABS. BASOPHILS 0.1 0.0 - 0.1 K/UL    ABS. IMM.  GRANS. 0.1 (H) 0.00 - 0.04 K/UL    DF AUTOMATED     PROCALCITONIN    Collection Time: 04/06/22 11:28 AM   Result Value Ref Range    Procalcitonin 4.78 (H) 0 ng/mL   METABOLIC PANEL, COMPREHENSIVE    Collection Time: 04/06/22 11:28 AM Result Value Ref Range    Sodium 147 (H) 136 - 145 mmol/L    Potassium 4.8 3.5 - 5.1 mmol/L    Chloride 119 (H) 97 - 108 mmol/L    CO2 23 21 - 32 mmol/L    Anion gap 5 5 - 15 mmol/L    Glucose 353 (H) 65 - 100 mg/dL    BUN 55 (H) 6 - 20 mg/dL    Creatinine 1.47 (H) 0.55 - 1.02 mg/dL    BUN/Creatinine ratio 37 (H) 12 - 20      GFR est AA 42 (L) >60 ml/min/1.73m2    GFR est non-AA 35 (L) >60 ml/min/1.73m2    Calcium 8.4 (L) 8.5 - 10.1 mg/dL    Bilirubin, total 0.5 0.2 - 1.0 mg/dL    AST (SGOT) 88 (H) 15 - 37 U/L    ALT (SGPT) 78 12 - 78 U/L    Alk. phosphatase 145 (H) 45 - 117 U/L    Protein, total 6.8 6.4 - 8.2 g/dL    Albumin 1.7 (L) 3.5 - 5.0 g/dL    Globulin 5.1 (H) 2.0 - 4.0 g/dL    A-G Ratio 0.3 (L) 1.1 - 2.2     GLUCOSE, POC    Collection Time: 04/06/22 11:31 AM   Result Value Ref Range    Glucose (POC) 354 (H) 65 - 117 mg/dL    Performed by [employer]    GLUCOSE, POC    Collection Time: 04/06/22  2:33 PM   Result Value Ref Range    Glucose (POC) 343 (H) 65 - 117 mg/dL    Performed by [address] [place] [address], POC    Collection Time: 04/06/22  4:49 PM   Result Value Ref Range    Glucose (POC) 295 (H) 65 - 117 mg/dL    Performed by [employer]          XR CHEST PORT   Final Result      CT HEAD WO CONT   Final Result   Chronic changes which appear stable. No acute or active intracranial process. Chronic paranasal sinus disease         XR CHEST PORT   Final Result   No acute findings.            Patient Active Problem List   Diagnosis Code    HTN (hypertension) I10    Hyperlipidemia E78.5    Neuropathy G62.9    Sciatica M54.30    Diabetes mellitus with neurological manifestations, uncontrolled [id number]    Infection of nailbed of toe of left foot L03.032    PAD (peripheral artery disease) (HCC) I73.9    Hypercalcemia E83.52    DM type 2 causing vascular disease ([facility] E11.59    Type 2 diabetes mellitus with nephropathy ([facility] E11.21    [facility]el[facility] of [facility] or foot [facility] M86.179    Diabetic peripheral neuropathy (HCC) E11.42    Spinal stenosis in cervical region M48.02    Ischemia of both lower extremities I99.8    Pain in both lower legs M79.661, M79.662    CVA (cerebral vascular accident) ([facility] I63.9    Elevated troponin R77.8    Hyperkalemia E87.5    UTI (urinary tract infection) N39.0    Sepsis (HCC) A41.9    RICARDO (acute kidney injury) ([facility] N17.9    AMS (altered mental status) R41.82        DIAGNOSES:  1. Hypernatremia, much worse after correction of serum glucose  2. UTI/sepsis on Zosyn and vancomycin  3. Gangrene of left great toe  4. Acute kidney injury on chronic kidney disease  5. Encephalopathy  6. Acute on chronic anemia  7. History of CVA  8. Diabetes mellitus with complications  9. Peripheral arterial disease  DISCUSSION:  Corrected serum sodium is around 153, best way to address this would be give free water flushes  PLAN:   Increase rate of free water flushes   Can continue half-normal saline        Thanks for consulting me. Please don't hesitate to contact me if any questions arise of if I can assist in any manner. This dictation was done by dragon, computer voice recognition software. Often unanticipated grammatical, syntax, phones and other interpretive errors are inadvertently transcribed. Please excuse errors that have escaped final proofreading. Please contact me if you suspect dictation or transcription errors.   Dr Shikha Poole  [address], [address] [facility], [address] [facility]  Cell Phone: [phone]  Office phone: [phone]  Fax: [phone]

## 2022-04-06 NOTE — CONSULTS
History and Physical    Chief complaints: Sacral wounds and multiple toe gangrene   History of Presenting Illness:  Eli Lagos is a 78 y.o. very pleasant woman currently hospitalized with fever and altered mental status. Patient currently immobile as well due to stroke. Patient examined. I was consulted examined patient sacral wounds and severe peripheral vascular disease. Patient is poor historian not able to provide any history. Patient stated have low-grade temperature. I have spoken to patient's daughter about the leg condition. Apparently vascular surgery has been involved on left leg circulation about 2 weeks ago at [facility].  On examination patient has a dry gangrene in the great toe Doppler signal present on the DP. Distal toes of the right foot also has discoloration as well. Doppler signal noted on the right foot. Patient had an arterial duplex ultrasound findings discussed above. I also examined the patient sacral wounds. Patient has linear black eschar about 1.5 x 4 cm with a stage II sacral wound. It looks like a linear black eschar on the sacrum looks like a full-thickness skin involvement. And surrounding this black eschar there is a stage I sacral wound.       Past Medical History:   Diagnosis Date    Diabetes mellitus ([address]     HTN (hypertension)     Hyperlipidemia     Neuropathy     PAD (peripheral artery disease) (HCC)     PCI    Sciatica       Past Surgical History:   Procedure Laterality Date    HX AMPUTATION Right     great toe    HX CERVICAL FUSION      HX OTHER SURGICAL Bilateral     Stent placement    HX OTHER SURGICAL      HX VASCULAR STENT Bilateral     2 in right 1 in left    HX VASCULAR STENT Bilateral      Family History   Problem Relation Age of Onset    Cancer Mother     Diabetes Mother     Hypertension Mother       Social History     Tobacco Use    Smoking status: Never Smoker    Smokeless tobacco: Never Used   Substance Use Topics    Alcohol use: Not Currently       Prior to Admission medications    Medication Sig Start Date End Date Taking? Authorizing Provider   gabapentin (NEURONTIN) 300 mg capsule Take 1 Capsule by mouth two (2) times a day. Max Daily Amount: 600 mg. 3/14/22   Noé Otto MD   insulin glargine (LANTUS) 100 unit/mL injection 50 Units by SubCUTAneous route daily. 3/14/22   Noé Otto MD   acidophilus-pectin, citrus 25 million cell -100 mg tab tablet Take 2 Tablets by mouth daily. 3/15/22   Yareli JACKSON MD   amLODIPine (NORVASC) 5 mg tablet Take 1 Tablet by mouth daily. 3/14/22   Noé Otto MD   artificial saliva (MOUTH KOTE) spra Take 1 Spray by mouth three (3) times daily. 3/14/22   Yareli JACKSON MD   latanoprost (XALATAN) 0.005 % ophthalmic solution Administer 1 Drop to right eye every evening. 3/14/22   Noé Otto MD   lidocaine (XYLOCAINE) 4 % (40 mg/mL) topical solution Apply 1 mL to affected area as needed for Pain. 3/14/22   Noé Otto MD   metoprolol succinate (TOPROL-XL) 50 mg XL tablet Take 1 Tablet by mouth two (2) times a day. 3/14/22   Noé Otto MD   atorvastatin (LIPITOR) 20 mg tablet TAKE 1 TABLET BY MOUTH EVERY NIGHT 2/3/22   Dayna Hernandez MD   brimonidine (ALPHAGAN) 0.2 % ophthalmic solution Administer 1 Drop to both eyes three (3) times daily. Provider, Historical   aspirin delayed-release 81 mg tablet Take  by mouth daily. Provider, Historical   ferrous sulfate 325 mg (65 mg iron) tablet Take 1 Tab by mouth two (2) times a day. 12/19/17   Dayna Hernandez MD     No Known Allergies     Review of Systems:  Pertinent review of systems discussed in HPI, and rest of organ systems personally reviewed and they are negative.     Objective:   Vital signs reviewed:      Visit Vitals  BP (!) 160/68 (BP 1 Location: Right upper arm, BP Patient Position: At rest)   Pulse 86   Temp 99.5 °F (37.5 °C)   Resp 20   Ht 5' 7.99\" (1.727 m)   Wt 178 lb 5.6 oz (80.9 kg)   SpO2 96%   BMI 27.12 kg/m²       Physical Exam:   General appearance:   Patient is not in particular distress. Head and neck atraumatic normocephalic. ENT shows normal oral mucosa, no jaundice no hoarse voice. Eyes: Pupil equal gaze appropriate. Cardiac system regular rate rhythm. Pulmonary: No audible wheeze. Chest wall: Chest wall excursion normal with respiration cycle, there is no deformity or chest trauma. Abdomen: Soft not tender or distended, bowel sounds active. There is no obvious palpable mass, or hernia. Neurologic: Nonfocal.  Cranial nerves intact, no new focal findings. Musculoskeletal system: Moving upper extremity  Skin: Warm and moist.  Hematologic system: No obvious bruising. Psychosocial: Appropriate and cooperative. Vascular examination: Lower and upper extremities warm to touch, no signs of ischemia or cyanosis. Data Review: Labs are reviewed. Discussed  Recent Results (from the past 24 hour(s))   GLUCOSE, POC    Collection Time: 04/05/22  5:51 AM   Result Value Ref Range    Glucose (POC) 299 (H) 65 - 117 mg/dL    Performed by ALYSA KEENE    CBC WITH AUTOMATED DIFF    Collection Time: 04/05/22  7:22 AM   Result Value Ref Range    WBC 16.9 (H) 3.6 - 11.0 K/uL    RBC 2.32 (L) 3.80 - 5.20 M/uL    HGB 6.3 (L) 11.5 - 16.0 g/dL    HCT 21.3 (L) 35.0 - 47.0 %    MCV 91.8 80.0 - 99.0 FL    MCH 27.2 26.0 - 34.0 PG    MCHC 29.6 (L) 30.0 - 36.5 g/dL    RDW 17.7 (H) 11.5 - 14.5 %    PLATELET 403 112 - 507 K/uL    MPV 11.5 8.9 - 12.9 FL    NRBC 0.2 (H) 0.0  WBC    ABSOLUTE NRBC 0.03 (H) 0.00 - 0.01 K/uL    NEUTROPHILS 80 (H) 32 - 75 %    LYMPHOCYTES 12 12 - 49 %    MONOCYTES 5 5 - 13 %    EOSINOPHILS 1 0 - 7 %    BASOPHILS 1 0 - 1 %    IMMATURE GRANULOCYTES 1 (H) 0 - 0.5 %    ABS. NEUTROPHILS 13.6 (H) 1.8 - 8.0 K/UL    ABS. LYMPHOCYTES 2.1 0.8 - 3.5 K/UL    ABS. MONOCYTES 0.9 0.0 - 1.0 K/UL    ABS. EOSINOPHILS 0.2 0.0 - 0.4 K/UL    ABS.  BASOPHILS 0.1 0.0 - 0.1 K/UL    ABS. IMM. GRANS. 0.1 (H) 0.00 - 0.04 K/UL    DF AUTOMATED     METABOLIC PANEL, COMPREHENSIVE    Collection Time: 04/05/22  7:22 AM   Result Value Ref Range    Sodium 148 (H) 136 - 145 mmol/L    Potassium 5.0 3.5 - 5.1 mmol/L    Chloride 120 (H) 97 - 108 mmol/L    CO2 22 21 - 32 mmol/L    Anion gap 6 5 - 15 mmol/L    Glucose 332 (H) 65 - 100 mg/dL    BUN 72 (H) 6 - 20 mg/dL    Creatinine 1.71 (H) 0.55 - 1.02 mg/dL    BUN/Creatinine ratio 42 (H) 12 - 20      GFR est AA 35 (L) >60 ml/min/1.73m2    GFR est non-AA 29 (L) >60 ml/min/1.73m2    Calcium 8.0 (L) 8.5 - 10.1 mg/dL    Bilirubin, total 0.4 0.2 - 1.0 mg/dL    AST (SGOT) 142 (H) 15 - 37 U/L    ALT (SGPT) 100 (H) 12 - 78 U/L    Alk.  phosphatase 140 (H) 45 - 117 U/L    Protein, total 6.7 6.4 - 8.2 g/dL    Albumin 1.7 (L) 3.5 - 5.0 g/dL    Globulin 5.0 (H) 2.0 - 4.0 g/dL    A-G Ratio 0.3 (L) 1.1 - 2.2     C REACTIVE PROTEIN, QT    Collection Time: 04/05/22  7:22 AM   Result Value Ref Range    C-Reactive protein 28.40 (H) 0.00 - 0.60 mg/dL   PROCALCITONIN    Collection Time: 04/05/22  7:22 AM   Result Value Ref Range    Procalcitonin 6.93 (H) 0 ng/mL   GLUCOSE, POC    Collection Time: 04/05/22  7:39 AM   Result Value Ref Range    Glucose (POC) 351 (H) 65 - 117 mg/dL    Performed by [address]    Collection Time: 04/05/22 11:12 AM   Result Value Ref Range    Crossmatch Expiration 04/08/2022,2359     ABO/Rh(D) Noah Chaudhari Positive     Antibody screen Negative     Unit number [id number]     Blood component type RC LR     Unit division 00     Status of unit [address] to transfuse     Crossmatch result Compatible    GLUCOSE, POC    Collection Time: 04/05/22 11:35 AM   Result Value Ref Range    Glucose (POC) 357 (H) 65 - 117 mg/dL    Performed by Dayo Asher    GLUCOSE, POC    Collection Time: 04/05/22  3:02 PM   Result Value Ref Range    Glucose (POC) 316 (H) 65 - 117 mg/dL    Performed by [address] [place] [address] Collection Time: 04/05/22  5:42 PM   Result Value Ref Range    Glucose (POC) 356 (H) 65 - 117 mg/dL    Performed by Burak Lo POC    Collection Time: 04/05/22  8:24 PM   Result Value Ref Range    Glucose (POC) 347 (H) 65 - 117 mg/dL    Performed by Bhupinder Colbert    GLUCOSE, POC    Collection Time: 04/05/22 11:11 PM   Result Value Ref Range    Glucose (POC) 307 (H) 65 - 117 mg/dL    Performed by Bhupinder Colbert    GLUCOSE, POC    Collection Time: 04/06/22  2:25 AM   Result Value Ref Range    Glucose (POC) 330 (H) 65 - 117 mg/dL    Performed by Bhupinder Colbert              Imagings reviewed: discussed as below. No name on file. Assessment:     Active Problems:    Hyperkalemia (4/4/2022)      UTI (urinary tract infection) (4/4/2022)      Sepsis ([facility] [address] (4/4/2022)      RICARDO (acute kidney injury) ([facility] (4/4/2022)      AMS (altered mental status) (4/4/2022)        Plan:     I have reviewed the arterial duplex examination. Patient has a moderate stenosis bilateral superficial femoral artery and looks like one-vessel runoff of both lower leg. Patient had a left leg intervened 2 weeks ago at [facility].    I did recommend at some point repeat angiogram especially right leg. Right leg also has a severe to moderate peripheral vascular disease. If patient is more stable, recommend great toe amputation. On the left side. For now  wound care will continue iodine swabs daily on the especially left great toe gangrene. Sacral wound is to be protected and recommend debriding ointment such as Medihoney ointments or Santyl ointment. I will closely monitor her progress while patient is hospitalized.

## 2022-04-06 NOTE — PROGRESS NOTES
IMPRESSION:   1. Sepsis  2. Urinary tract infection  3. Acute kidney injury  4. Hyperkalemia  5. History of CVA with right-sided weakness  6. Anemia      RECOMMENDATIONS/PLAN:   1. She is on Zosyn vancomycin  2. Probably dehydration sepsis infection start patient on IV fluid  3. Chest x-ray no acute infiltrate  4. recieve packed RBC today's lab pending     [x] High complexity decision making was performed  [x] See my orders for details  HPI  82-year-old lady nursing home resident came in as she was found confused with altered mental status family noted that she was not acting properly she is in a nursing home unable to get any history to the patient she has significant past medical history of CVA right-sided weakness hypertension diabetes mellitus she is bedridden open eyes does not follow any command possibly septic so admitted, and critical care consult was called  PMH:  has a past medical history of Diabetes mellitus ([address] HTN (hypertension), Hyperlipidemia, Neuropathy, PAD (peripheral artery disease) ([address] and Sciatica. PSH:   has a past surgical history that includes hx other surgical (Bilateral); hx amputation (Right); hx other surgical; hx cervical fusion; hx vascular stent (Bilateral); and hx vascular stent (Bilateral). FHX: family history includes Cancer in her mother; Diabetes in her mother; Hypertension in her mother. SHX:  reports that she has never smoked. She has never used smokeless tobacco. She reports previous alcohol use. She reports that she does not use drugs.     ALL: No Known Allergies     MEDS:   [x] Reviewed - As Below   [] Not reviewed    Current Facility-Administered Medications   Medication    0.9% sodium chloride infusion 250 mL    gabapentin (NEURONTIN) capsule 300 mg    0.9% sodium chloride infusion 250 mL    insulin lispro (HUMALOG) injection    insulin glargine (LANTUS) injection 22 Units    atorvastatin (LIPITOR) tablet 20 mg    latanoprost (XALATAN) 0.005 % ophthalmic solution 1 Drop    aspirin delayed-release tablet 81 mg    brimonidine (ALPHAGAN) 0.2 % ophthalmic solution 1 Drop    glucose chewable tablet 16 g    glucagon (GLUCAGEN) injection 1 mg    acetaminophen (TYLENOL) suppository 650 mg    polyethylene glycol (MIRALAX) packet 17 g    ondansetron (ZOFRAN ODT) tablet 4 mg    Or    ondansetron (ZOFRAN) injection 4 mg    heparin (porcine) injection 5,000 Units    0.45% sodium chloride infusion    piperacillin-tazobactam (ZOSYN) 3.375 g in 0.9% sodium chloride (MBP/ADV) 100 mL MBP    albuterol-ipratropium (DUO-NEB) 2.5 MG-0.5 MG/3 ML    ferrous sulfate 300 mg (60 mg iron)/5 mL oral syrup 300 mg    acetaminophen (TYLENOL) solution 650 mg      MAR reviewed and pertinent medications noted or modified as needed   Current Facility-Administered Medications   Medication    0.9% sodium chloride infusion 250 mL    gabapentin (NEURONTIN) capsule 300 mg    0.9% sodium chloride infusion 250 mL    insulin lispro (HUMALOG) injection    insulin glargine (LANTUS) injection 22 Units    atorvastatin (LIPITOR) tablet 20 mg    latanoprost (XALATAN) 0.005 % ophthalmic solution 1 Drop    aspirin delayed-release tablet 81 mg    brimonidine (ALPHAGAN) 0.2 % ophthalmic solution 1 Drop    glucose chewable tablet 16 g    glucagon (GLUCAGEN) injection 1 mg    acetaminophen (TYLENOL) suppository 650 mg    polyethylene glycol (MIRALAX) packet 17 g    ondansetron (ZOFRAN ODT) tablet 4 mg    Or    ondansetron (ZOFRAN) injection 4 mg    heparin (porcine) injection 5,000 Units    0.45% sodium chloride infusion    piperacillin-tazobactam (ZOSYN) 3.375 g in 0.9% sodium chloride (MBP/ADV) 100 mL MBP    albuterol-ipratropium (DUO-NEB) 2.5 MG-0.5 MG/3 ML    ferrous sulfate 300 mg (60 mg iron)/5 mL oral syrup 300 mg    acetaminophen (TYLENOL) solution 650 mg      PMH:  has a past medical history of Diabetes mellitus ([address] HTN (hypertension), Hyperlipidemia, Neuropathy, PAD (peripheral artery disease) ([facility] [address] [address] and Sciatica. PSH:   has a past surgical history that includes hx other surgical (Bilateral); hx amputation (Right); hx other surgical; hx cervical fusion; hx vascular stent (Bilateral); and hx vascular stent (Bilateral). FHX: family history includes Cancer in her mother; Diabetes in her mother; Hypertension in her mother. SHX:  reports that she has never smoked. She has never used smokeless tobacco. She reports previous alcohol use. She reports that she does not use drugs. ROS:  Unable to obtain unresponsive    Hemodynamics:    CO:    CI:    CVP:    SVR:   PAP Systolic:    PAP Diastolic:    PVR:    OM10:        Ventilator Settings:      Mode Rate TV Press PEEP FiO2 PIP Min. Vent                              Vital Signs: Telemetry:    normal sinus rhythm Intake/Output:   Visit Vitals  BP (!) 167/76   Pulse 82   Temp 99.4 °F (37.4 °C)   Resp 22   Ht 5' 7.99\" (1.727 m)   Wt 80.9 kg (178 lb 5.6 oz)   SpO2 96%   BMI 27.12 kg/m²       Temp (24hrs), Av.6 °F (37.6 °C), Min:98.6 °F (37 °C), Max:101 °F (38.3 °C)        O2 Device: None (Room air) O2 Flow Rate (L/min): 2 l/min       Wt Readings from Last 4 Encounters:   22 80.9 kg (178 lb 5.6 oz)   22 82.2 kg (181 lb 3.5 oz)   20 84.4 kg (186 lb)   20 84.8 kg (187 lb)          Intake/Output Summary (Last 24 hours) at 2022 1038  Last data filed at 2022 0908  Gross per 24 hour   Intake 2821 ml   Output 1000 ml   Net 1821 ml       Last shift:      701 -  1900  In: 350   Out: -   Last 3 shifts: 1901 -  0700  In: 2471   Out: 2700 [Urine:2700]       Physical Exam:     General: Open eyes but does not follow any command  HEENT: NCAT, poor dentition, lips and mucosa dry  Eyes: anicteric; conjunctiva clear  Neck: no nodes, trach midline; no accessory MM use.   Chest: no deformity,   Cardiac: R regular; no murmur;   Lungs: distant breath sounds; no wheezes  Abd: soft, NT, hypoactive BS  Ext: no edema; no joint swelling;  No clubbing  : NO giraldo, clear urine  Neuro: She only moves her left arm  Psych- unable to assess  Skin: warm, dry, no cyanosis;   Pulses: 1-2+ Bilateral pedal, radial  Capillary: brisk; pale      DATA:    MAR reviewed and pertinent medications noted or modified as needed  MEDS:   Current Facility-Administered Medications   Medication    0.9% sodium chloride infusion 250 mL    gabapentin (NEURONTIN) capsule 300 mg    0.9% sodium chloride infusion 250 mL    insulin lispro (HUMALOG) injection    insulin glargine (LANTUS) injection 22 Units    atorvastatin (LIPITOR) tablet 20 mg    latanoprost (XALATAN) 0.005 % ophthalmic solution 1 Drop    aspirin delayed-release tablet 81 mg    brimonidine (ALPHAGAN) 0.2 % ophthalmic solution 1 Drop    glucose chewable tablet 16 g    glucagon (GLUCAGEN) injection 1 mg    acetaminophen (TYLENOL) suppository 650 mg    polyethylene glycol (MIRALAX) packet 17 g    ondansetron (ZOFRAN ODT) tablet 4 mg    Or    ondansetron (ZOFRAN) injection 4 mg    heparin (porcine) injection 5,000 Units    0.45% sodium chloride infusion    piperacillin-tazobactam (ZOSYN) 3.375 g in 0.9% sodium chloride (MBP/ADV) 100 mL MBP    albuterol-ipratropium (DUO-NEB) 2.5 MG-0.5 MG/3 ML    ferrous sulfate 300 mg (60 mg iron)/5 mL oral syrup 300 mg    acetaminophen (TYLENOL) solution 650 mg        Labs:    Recent Labs     04/05/22  0722 04/03/22  2243   WBC 16.9* 16.9*   HGB 6.3* 7.3*    321     Recent Labs     04/05/22  0722 04/04/22  0600 04/04/22  0254 04/03/22  2243   *  --  147* 144   K 5.0  --  5.6* 6.2*   *  --  120* 114*   CO2 22  --  23 25   *  --  223* 272*   BUN 72*  --  94* 105*   CREA 1.71*  --  2.41* 3.00*   CA 8.0*  --  8.2* 8.3*   LAC  --  1.2  --  1.7   ALB 1.7*  --   --  2.0*   *  --   --  94*     Recent Labs     04/04/22  0232 04/03/22  8030   PH 7.39  --    PCO2 39  --    PO2 121*  --    HCO3 23  -- FIO2  --  21.0     No results for input(s): CPK, CKNDX, TROIQ in the last 72 hours. No lab exists for component: CPKMB  No results found for: BNPP, BNP   Lab Results   Component Value Date/Time    Culture result: No growth 2 days 04/03/2022 10:43 PM    Culture result: No growth 6 days 03/05/2022 10:16 AM    Culture result: Heavy Escherichia coli 02/28/2022 08:15 PM    Culture result: Moderate  Mixed skin yasmine isolated   02/28/2022 08:15 PM     Lab Results   Component Value Date/Time    TSH 2.880 12/12/2016 10:13 AM        Imaging:    Results from [place] encounter on 04/03/22    XR CHEST PORT    Narrative  Upright radiograph chest 10:46 p.m. compared to January 25, 2022. INDICATION: Altered mental status. Heart size is enlarged with an atherosclerotic aorta. No significant infiltrate  or effusion. Bones appear intact. Degenerative changes of the shoulders. Postfusion changes lower cervical spine. Impression  No acute findings. Results from [place] encounter on 04/03/22    CT HEAD WO CONT    Narrative  PROCEDURE: CT HEAD WO CONT    HISTORY:Altered mental status    COMPARISON:Head CT 3 March 2022    Department policy stipulates all CT scans at this facility are performed using  dose reduction optimization techniques as appropriate to the performed exam,  including the following: Automated exposure control, adjustments of the mA  and/or KVP according to the patient size, and the use of iterative  reconstruction technique. TECHNIQUE: Without IV contrast    Bones/extracranial soft tissues:  Maxillary sinuses and right frontal sinus  remains nearly completely opacified. Extra-axial spaces:  No hemorrhage, mass or focal fluid collection. Ventricles/sulci:  There is mild dilatation of the ventricles. Sulci are  prominent. Brain parenchyma: An area of encephalomalacia in the left frontal lobe is  showing chronic evolution without evidence of hemorrhagic conversion.  No other  focal lesions identified. No mass effect. There is good gray-white differentiation. There are  inhomogeneous areas of mildly decreased density in periventricular white matter. Impression  Chronic changes which appear stable. No acute or active intracranial process.   Chronic paranasal sinus disease

## 2022-04-06 NOTE — PROGRESS NOTES
CM discussed discharge planning with daughter at bedside. Patient will return to [facility] at discharge. Updated clinicals sent via [facility].

## 2022-04-06 NOTE — PROGRESS NOTES
PROGRESS NOTE    Patient: Emelia Barker MRN: [id number]  SSN: [id number]    YOB: 1945  Age: 78 y.o. Sex: female      Admit Date: 4/3/2022    LOS: 2 days       Subjective     Chief Complaint   Patient presents with    Altered mental status       HPI: Patient is a 78y.o. 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 04/05   Patient is seen at bedside with daughter and nephew today. Patient is in distress due to pain and daughter notes that patient gets Neurontin TID for neuropathy daily. Otherwise, patient is still receiving enteric feeding through PEG tube and getting IVF. Patient was seen by nephrology yesterday. Recommends obtaining renal panel and continuing IVF. Patient was seen by ID yesterday. Recommends continuing IV vancomycin for urosepsis. Patient was seen by pulmonology today. Recommends PRBC transfusion. Labs indicate WBC 16.9, Hgb 6.3, Na 148, Cl 120, BUN 72, Cr 1.71, Ca 8, CRP 28.4, pro-carrie 6.93 and .       04/06   Patient is seen at bedside. Patient received 2 units of PRBC. Patient is on Zosyn. No new changes today. Patient seen by the vascular surgeon he recommend great toe amputation  And for sacral wound recommend Medihoney ointment     Labs show increasing Hgb of 9, decreasing WBC 14.7, Na 147, , BUN 55,   Cr 1.47, and CRP 21.4. CXR is unremarkable. ROS  Unable to obtain.      Objective     Visit Vitals  BP (!) 168/72   Pulse 78   Temp 98.2 °F (36.8 °C)   Resp 20   Ht 5' 7.99\" (1.727 m)   Wt 80.9 kg (178 lb 5.6 oz)   SpO2 97%   BMI 27.12 kg/m²    O2 Flow Rate (L/min): 0 l/min O2 Device: None (Room air)    Physical Exam:   Physical Exam  Constitutional: Open eyes do not follow any,. HENT:   Head: Normocephalic and atraumatic. Eyes: Pupils are equal, round, and reactive to light. EOM are normal.   Cardiovascular: Normal rate, regular rhythm and normal heart sounds. Pulmonary/Chest: Breath sounds normal. No wheezes. No rales. Exhibits no tenderness. Abdominal: Soft. Bowel sounds are normal. There is no abdominal tenderness. There is no rebound and no guarding. Musculoskeletal: Normal range of motion. Neurological: Open eyes do not follow any, right-sided weakness  Intake & Output:  Current Shift: 04/06 0701 - 04/06 1900  In: 350   Out: 1300 [Urine:1300]  Last three shifts: 04/04 1901 - 04/06 0700  In: 2471   Out: 2700 [Urine:2700]    Lab/Data Review: All lab results for the last 24 hours reviewed.        24 Hour Results:    Recent Results (from the past 24 hour(s))   GLUCOSE, POC    Collection Time: 04/05/22  3:02 PM   Result Value Ref Range    Glucose (POC) 316 (H) 65 - 117 mg/dL    Performed by [address] [place] [address], POC    Collection Time: 04/05/22  5:42 PM   Result Value Ref Range    Glucose (POC) 356 (H) 65 - 117 mg/dL    Performed by [employer] [facility], POC    Collection Time: 04/05/22  8:24 PM   Result Value Ref Range    Glucose (POC) 347 (H) 65 - 117 mg/dL    Performed by Oris Benes    GLUCOSE, POC    Collection Time: 04/05/22 11:11 PM   Result Value Ref Range    Glucose (POC) 307 (H) 65 - 117 mg/dL    Performed by Oris Benes    GLUCOSE, POC    Collection Time: 04/06/22  2:25 AM   Result Value Ref Range    Glucose (POC) 330 (H) 65 - 117 mg/dL    Performed by Oris Benes    GLUCOSE, POC    Collection Time: 04/06/22  6:53 AM   Result Value Ref Range    Glucose (POC) 326 (H) 65 - 117 mg/dL    Performed by Vladislav Burns    C REACTIVE PROTEIN, QT    Collection Time: 04/06/22 11:28 AM   Result Value Ref Range    C-Reactive protein 21.40 (H) 0.00 - 0.60 mg/dL   CBC WITH AUTOMATED DIFF    Collection Time: 04/06/22 11:28 AM   Result Value Ref Range    WBC 14.7 (H) 3.6 - 11.0 K/uL    RBC 3.24 (L) 3.80 - 5.20 M/uL    HGB 9.0 (L) 11.5 - 16.0 g/dL    HCT 28.6 (L) 35.0 - 47.0 %    MCV 88.3 80.0 - 99.0 FL    MCH 27.8 26.0 - 34.0 PG    MCHC 31.5 30.0 - 36.5 g/dL    RDW 17.1 (H) 11.5 - 14.5 %    PLATELET 821 799 - 705 K/uL    MPV 11.2 8.9 - 12.9 FL    NRBC 0.5 (H) 0.0  WBC    ABSOLUTE NRBC 0.07 (H) 0.00 - 0.01 K/uL    NEUTROPHILS 76 (H) 32 - 75 %    LYMPHOCYTES 16 12 - 49 %    MONOCYTES 4 (L) 5 - 13 %    EOSINOPHILS 2 0 - 7 %    BASOPHILS 1 0 - 1 %    IMMATURE GRANULOCYTES 1 (H) 0 - 0.5 %    ABS. NEUTROPHILS 11.4 (H) 1.8 - 8.0 K/UL    ABS. LYMPHOCYTES 2.3 0.8 - 3.5 K/UL    ABS. MONOCYTES 0.6 0.0 - 1.0 K/UL    ABS. EOSINOPHILS 0.2 0.0 - 0.4 K/UL    ABS. BASOPHILS 0.1 0.0 - 0.1 K/UL    ABS. IMM. GRANS. 0.1 (H) 0.00 - 0.04 K/UL    DF AUTOMATED     PROCALCITONIN    Collection Time: 04/06/22 11:28 AM   Result Value Ref Range    Procalcitonin 4.78 (H) 0 ng/mL   METABOLIC PANEL, COMPREHENSIVE    Collection Time: 04/06/22 11:28 AM   Result Value Ref Range    Sodium 147 (H) 136 - 145 mmol/L    Potassium 4.8 3.5 - 5.1 mmol/L    Chloride 119 (H) 97 - 108 mmol/L    CO2 23 21 - 32 mmol/L    Anion gap 5 5 - 15 mmol/L    Glucose 353 (H) 65 - 100 mg/dL    BUN 55 (H) 6 - 20 mg/dL    Creatinine 1.47 (H) 0.55 - 1.02 mg/dL    BUN/Creatinine ratio 37 (H) 12 - 20      GFR est AA 42 (L) >60 ml/min/1.73m2    GFR est non-AA 35 (L) >60 ml/min/1.73m2    Calcium 8.4 (L) 8.5 - 10.1 mg/dL    Bilirubin, total 0.5 0.2 - 1.0 mg/dL    AST (SGOT) 88 (H) 15 - 37 U/L    ALT (SGPT) 78 12 - 78 U/L    Alk.  phosphatase 145 (H) 45 - 117 U/L    Protein, total 6.8 6.4 - 8.2 g/dL    Albumin 1.7 (L) 3.5 - 5.0 g/dL    Globulin 5.1 (H) 2.0 - 4.0 g/dL    A-G Ratio 0.3 (L) 1.1 - 2.2     GLUCOSE, POC    Collection Time: 04/06/22 11:31 AM   Result Value Ref Range    Glucose (POC) 354 (H) 65 - 117 mg/dL    Performed by Padma Jonas          Imaging:    XR CHEST PORT   Final Result      CT HEAD WO CONT   Final Result   Chronic changes which appear stable. No acute or active intracranial process. Chronic paranasal sinus disease         XR CHEST PORT   Final Result   No acute findings. Assessment     Severe sepsis with UTI  UTI  Acute on chronic kidney disease  Hyperkalemia  CVA with right-sided weakness  Anemia of chronic disease  Gangrene of the left great toe  Sacral decubitus ulcer    Plan    IV Zosyn   sliding scale insulin  Aspirin 81 mg daily  Lipitor 20 mg daily  Alphagan 0.2% 3 times a day  Ferrous sulfate 325 mg twice a day  Heparin 5000 units subcu every 8 hours  Half-normal saline at 100 cc/h  Duo-neb 2.5 mg-0.5 mg PRN  Daily renal panel per nephrology recommendation. Full code. Start on IVF.   Pulmonary nephrology infectious disease and vascular surgical.  Follow-up with the vascular surgeon regarding amputation of the left great toe    Discussed with the patient daughter at the bedside    Current Facility-Administered Medications:     albuterol-ipratropium (DUO-NEB) 2.5 MG-0.5 MG/3 ML, 3 mL, Nebulization, Q6HWA RT, Martínez Crespo MD, 3 mL at 04/06/22 1301    0.9% sodium chloride infusion 250 mL, 250 mL, IntraVENous, PRN, Martínez Crespo MD    gabapentin (NEURONTIN) capsule 300 mg, 300 mg, Oral, TID, Israel Soto MD, 300 mg at 04/06/22 0827    0.9% sodium chloride infusion 250 mL, 250 mL, IntraVENous, PRN, Israel Soto MD    insulin lispro (HUMALOG) injection, , SubCUTAneous, Q6H, Israel Soto MD, 5 Units at 04/06/22 0703    insulin glargine (LANTUS) injection 22 Units, 22 Units, SubCUTAneous, DAILY, Israel Soto MD, 22 Units at 04/06/22 0827    atorvastatin (LIPITOR) tablet 20 mg, 20 mg, Oral, QHS, Monserrat Loco MD, 20 mg at 04/05/22 2359    latanoprost (XALATAN) 0.005 % ophthalmic solution 1 Drop, 1 Drop, Right Eye, QPM, Israel Soto MD, 1 Drop at 04/05/22 1751    aspirin delayed-release tablet 81 mg, 81 mg, Oral, DAILY, Israel Soto MD, 81 mg at 04/06/22 0827    brimonidine (ALPHAGAN) 0.2 % ophthalmic solution 1 Drop, 1 Drop, Both Eyes, TID, Israel Soto MD, 1 Drop at 04/06/22 0829    glucose chewable tablet 16 g, 4 Tablet, Oral, PRN, Maranda Soto MD    glucagon (GLUCAGEN) injection 1 mg, 1 mg, IntraMUSCular, PRN, MD Geoffrey Garcia  [DISCONTINUED] acetaminophen (TYLENOL) tablet 650 mg, 650 mg, Oral, Q6H PRN, 650 mg at 04/04/22 2246 **OR** acetaminophen (TYLENOL) suppository 650 mg, 650 mg, Rectal, Q6H PRN, Maranda Soto MD    polyethylene glycol (MIRALAX) packet 17 g, 17 g, Oral, DAILY PRN, Maranda Soto MD    ondansetron (ZOFRAN ODT) tablet 4 mg, 4 mg, Oral, Q8H PRN **OR** ondansetron (ZOFRAN) injection 4 mg, 4 mg, IntraVENous, Q6H PRN, Israel Soto MD    heparin (porcine) injection 5,000 Units, 5,000 Units, SubCUTAneous, Q8H, Israel Soto MD, 5,000 Units at 04/06/22 0703    0.45% sodium chloride infusion, 100 mL/hr, IntraVENous, CONTINUOUS, Israel Soto MD, Last Rate: 100 mL/hr at 04/05/22 0555, 100 mL/hr at 04/05/22 0555    piperacillin-tazobactam (ZOSYN) 3.375 g in 0.9% sodium chloride (MBP/ADV) 100 mL MBP, 3.375 g, IntraVENous, Q8H, Israel Soto MD, Last Rate: 25 mL/hr at 04/06/22 0827, 3.375 g at 04/06/22 0827    albuterol-ipratropium (DUO-NEB) 2.5 MG-0.5 MG/3 ML, 3 mL, Nebulization, Q6H PRN, Israel Soto MD    ferrous sulfate 300 mg (60 mg iron)/5 mL oral syrup 300 mg, 300 mg, Oral, BID, Israel Soto MD, 300 mg at 04/06/22 0827    acetaminophen (TYLENOL) solution 650 mg, 650 mg, Per NG tube, Q6H PRN, Israel Soto MD, 650 mg at 04/06/22 0703    Current Outpatient Medications   Medication Instructions    acidophilus-pectin, citrus 25 million cell -100 mg tab tablet 2 Tablets, Oral, DAILY    amLODIPine (NORVASC) 5 mg, Oral, DAILY    artificial saliva (MOUTH KOTE) spra 1 Spray, Oral, 3 TIMES DAILY    aspirin delayed-release 81 mg tablet Oral, DAILY    atorvastatin (LIPITOR) 20 mg tablet TAKE 1 TABLET BY MOUTH EVERY NIGHT    brimonidine (ALPHAGAN) 0.2 % ophthalmic solution 1 Drop, Both Eyes, 3 TIMES DAILY    ferrous sulfate 325 mg, Oral, 2 TIMES DAILY    gabapentin (NEURONTIN) 300 mg, Oral, 2 TIMES DAILY    insulin glargine (LANTUS) 50 Units, SubCUTAneous, DAILY    latanoprost (XALATAN) 0.005 % ophthalmic solution 1 Drop, Right Eye, EVERY EVENING    lidocaine (XYLOCAINE) 4 % (40 mg/mL) topical solution 1 mL, Topical, AS NEEDED    metoprolol succinate (TOPROL-XL) 50 mg, Oral, 2 TIMES DAILY         Signed By: Chinedu Hitchcock MD     April 6, 2022

## 2022-04-06 NOTE — PROGRESS NOTES
PT/OT consult received, Patient is total care at [facility] at baseline. Unable to participate with therapy at this point. We will DC PT. Cristinae reorder therapy if anything changes. Thank you.

## 2022-04-06 NOTE — PROGRESS NOTES
Family member complain of lack of oral care for her mother. RN told family member that she would send in PCT to complete said oral care. Family member called Nurse manager to come and talk to her. Before nurse manager came to floor RN sent PCT to help with oral care. Nurse manager spoke with family member. Oral  Care complete.

## 2022-04-06 NOTE — PROGRESS NOTES
OT eval order received and acknowledged. Pt noted to be total care at baseline at SNF. Pt is unable to participate with therapy at this time. OT will discontinue orders. Please re-order OT if needs arise. Thank you.

## 2022-04-06 NOTE — PROGRESS NOTES
Progress Note    Patient: Yamini Ayala MRN: [id number]  SSN: [id number]    YOB: 1945  Age: 78 y.o. Sex: female      Admit Date: 4/3/2022    LOS: 2 days     Subjective:   Patient followed for sepsis with suspected UTI. Blood and urine cultures pending. She remains febrile but WBC is decreasing along with procal and CRP. She was seen by Vascular Surgery with possible left great toe amputation planned. Patient remains nonverbal but appears to be resting comfortably. Daughter at the bedside. Objective:     Vitals:    04/06/22 0639 04/06/22 0908 04/06/22 1109 04/06/22 1143   BP: (!) 158/67 (!) 167/76  (!) 166/76   Pulse: 85 82  78   Resp: 20 22 22   Temp: (!) 101 °F (38.3 °C) 99.4 °F (37.4 °C)  99.7 °F (37.6 °C)   SpO2: 97% 96% 98%    Weight:       Height:            Intake and Output:  Current Shift: 04/06 0701 - 04/06 1900  In: 350   Out: -   Last three shifts: 04/04 1901 - 04/06 0700  In: 2471   Out: 2700 [Urine:2700]    Physical Exam:   Vitals and nursing note reviewed. Constitutional:       General: She is not in acute distress. Appearance: She is ill-appearing. HENT: unremarkable except Poor dentition  Eyes:      Pupils: Pupils are equal, round, and reactive to light. Cardiovascular:      Rate and Rhythm: Normal rate and regular rhythm. Heart sounds: No murmur heard. Pulmonary:      Effort: Pulmonary effort is normal.      Breath sounds: Normal breath sounds. Abdominal:      General: Bowel sounds are normal.      Palpations: Abdomen is soft. Tenderness: There is no abdominal tenderness. Comments: PEG tube in place, site unremarkable   Genitourinary:     Comments: Bell catheter  Musculoskeletal:      Right lower leg: No edema. Left lower leg: No edema. Comments: Left great toe with dry gangrene, dark and shrunken   Skin: Stage 2 sacral wound     Findings: No rash. Neurological:      Mental Status: She is alert.       Comments: Unable to assess   Psychiatric:      Comments: Unable to assess      Lab/Data Review:     WBC 14,700    Procal 4.78 <6.93  CRP 21.40 <28.40    Blood cultures (4/3) No growth 2 days  Urine culture (4/3) Pending    CXR (4/6) Lungs clear; no interstitial or alveolar pulmonary edema. Cardiac and  mediastinal structures unchanged. No pneumothorax or pleural effusion. Cervical  neck hardware. Rotated      Assessment:     Active Problems:    Hyperkalemia (4/4/2022)      UTI (urinary tract infection) (4/4/2022)      Sepsis ([address] (4/4/2022)      RICARDO (acute kidney injury) ([facility] [address] (4/4/2022)      AMS (altered mental status) (4/4/2022)    1. Sepsis with fever and leukocytosis, elevated procal and CRP  2. Probable UTI with marked pyuria and bacteriuria, urine culture pending  3. Altered mentals status, multifactorial including sepsis  4. Uncontrolled diabetes mellitus with hyperglycemia  5. Chronic maxillary sinusitis  6. ASCVD with prior stroke  7. Hepatitis C antibody positive, HCV RNA negative  8. 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 Sacral wound, Stage 2, no evidence of gross infection     Comment: This still appears to be most likely urosepsis from Gram negative rods. Left great toe is gangrenous but not grossly infected and nor is her sacral wound. Plan:   1. Continue IV Zosyn pending culture results  2. Follow-up blood and urine cultures  3.  In am, repeat CBC, procal and CRP       Signed By: Binu Nguyễn MD     April 6, 2022

## 2022-04-06 NOTE — WOUND CARE
Ordered Envella bed via [employer], was informed yesterday that Envella bed was not available at that time. Called [facility] for update this morning, will receive call with update when available. Confirmation [id number].

## 2022-04-06 NOTE — PROGRESS NOTES
Physician Progress Note      Virginie Ramos  [facility] #:                  [id number]  :                       1945  ADMIT DATE:       4/3/2022 10:36 PM  DISCH DATE:  RESPONDING  PROVIDER #:        Raj Ventura MD        QUERY TEXT:    Stage of Chronic Kidney Disease: Please provide further specificity, if known. Clinical indicators include: chronic kidney disease, creatinine, bun, bun/creatinine, gfr, cr, cute on chronic kidney disease  Options provided:  -- Chronic kidney disease stage 1  -- Chronic kidney disease stage 2  -- Chronic kidney disease stage 3  -- Chronic kidney disease stage 3a  -- Chronic kidney disease stage 3b  -- Chronic kidney disease stage 4  -- Chronic kidney disease stage 5  -- Chronic kidney disease stage 5, requiring dialysis  -- End stage renal disease  -- Other - I will add my own diagnosis  -- Disagree - Not applicable / Not valid  -- Disagree - Clinically Unable to determine / Unknown        PROVIDER RESPONSE TEXT:    The patient has chronic kidney disease stage 3b. QUERY TEXT:    Type of Encephalopathy: Please provide further specificity, if known. Clinical indicators include: chronic kidney disease, fever, altered mental status, alcohol use, infection, hypercalcemia, sepsis, ams, epsis, ncephalopathy, hypernatremia, hypoglycemia  Options provided:  -- Anoxic/hypoxic encephalopathy  -- Metabolic encephalopathy  -- Toxic encephalopathy  -- Hepatic encephalopathy  -- Hypertensive encephalopathy  -- Other - I will add my own diagnosis  -- Disagree - Not applicable / Not valid  -- Disagree - Clinically Unable to determine / Unknown        PROVIDER RESPONSE TEXT:    The patient has metabolic encephalopathy.       Electronically signed by:  Surjit Jones MD 2022 1:22 PM

## 2022-04-06 NOTE — PROGRESS NOTES
2030-Daughter Maryse contacted and patient normally takes 22 units of Lantus at home and  Vitctoza 1.8 units daily. Notified made aware of order received for insulin coverage for patient at this time. 2036- Dr. Sharri Sarmiento made aware of patient's medication regimen as stated above and orders received at this time for patient to have both medications daily.

## 2022-04-06 NOTE — PROGRESS NOTES
Blood sugar checked at this time and reads 347. Dr. Lynda Johnson Notified and order received for 12 units humalog at this time, and to recheck in an hour. Will call patient's family per Dr. Lynda Johnson to find out if patient is on long acting insulin at home.

## 2022-04-07 NOTE — PROGRESS NOTES
IMPRESSION:   1. Sepsis  2. Urinary tract infection  3. Acute kidney injury  4. Hyperkalemia resolved  5. History of CVA with right-sided weakness  6. Anemia      RECOMMENDATIONS/PLAN:   1. She is on Zosyn vancomycin  2. Probably dehydration sepsis infection patient on IV fluid  3. Chest x-ray no acute infiltrate  4. recieve packed RBC today's Hb 10.6  5. Condition poor     [x] High complexity decision making was performed  [x] See my orders for details  HPI  29-year-old lady nursing home resident came in as she was found confused with altered mental status family noted that she was not acting properly she is in a nursing home unable to get any history to the patient she has significant past medical history of CVA right-sided weakness hypertension diabetes mellitus she is bedridden open eyes does not follow any command possibly septic so admitted, and critical care consult was called. PMH:  has a past medical history of Diabetes mellitus ([address] HTN (hypertension), Hyperlipidemia, Neuropathy, PAD (peripheral artery disease) ([address] and Sciatica. PSH:   has a past surgical history that includes hx other surgical (Bilateral); hx amputation (Right); hx other surgical; hx cervical fusion; hx vascular stent (Bilateral); and hx vascular stent (Bilateral). FHX: family history includes Cancer in her mother; Diabetes in her mother; Hypertension in her mother. SHX:  reports that she has never smoked. She has never used smokeless tobacco. She reports previous alcohol use. She reports that she does not use drugs.     ALL: No Known Allergies     MEDS:   [x] Reviewed - As Below   [] Not reviewed    Current Facility-Administered Medications   Medication    albuterol-ipratropium (DUO-NEB) 2.5 MG-0.5 MG/3 ML    0.9% sodium chloride infusion 250 mL    gabapentin (NEURONTIN) capsule 300 mg    0.9% sodium chloride infusion 250 mL    insulin lispro (HUMALOG) injection    insulin glargine (LANTUS) injection 22 Units  atorvastatin (LIPITOR) tablet 20 mg    latanoprost (XALATAN) 0.005 % ophthalmic solution 1 Drop    aspirin delayed-release tablet 81 mg    brimonidine (ALPHAGAN) 0.2 % ophthalmic solution 1 Drop    glucose chewable tablet 16 g    glucagon (GLUCAGEN) injection 1 mg    acetaminophen (TYLENOL) suppository 650 mg    polyethylene glycol (MIRALAX) packet 17 g    ondansetron (ZOFRAN ODT) tablet 4 mg    Or    ondansetron (ZOFRAN) injection 4 mg    heparin (porcine) injection 5,000 Units    0.45% sodium chloride infusion    piperacillin-tazobactam (ZOSYN) 3.375 g in 0.9% sodium chloride (MBP/ADV) 100 mL MBP    albuterol-ipratropium (DUO-NEB) 2.5 MG-0.5 MG/3 ML    ferrous sulfate 300 mg (60 mg iron)/5 mL oral syrup 300 mg    acetaminophen (TYLENOL) solution 650 mg      MAR reviewed and pertinent medications noted or modified as needed   Current Facility-Administered Medications   Medication    albuterol-ipratropium (DUO-NEB) 2.5 MG-0.5 MG/3 ML    0.9% sodium chloride infusion 250 mL    gabapentin (NEURONTIN) capsule 300 mg    0.9% sodium chloride infusion 250 mL    insulin lispro (HUMALOG) injection    insulin glargine (LANTUS) injection 22 Units    atorvastatin (LIPITOR) tablet 20 mg    latanoprost (XALATAN) 0.005 % ophthalmic solution 1 Drop    aspirin delayed-release tablet 81 mg    brimonidine (ALPHAGAN) 0.2 % ophthalmic solution 1 Drop    glucose chewable tablet 16 g    glucagon (GLUCAGEN) injection 1 mg    acetaminophen (TYLENOL) suppository 650 mg    polyethylene glycol (MIRALAX) packet 17 g    ondansetron (ZOFRAN ODT) tablet 4 mg    Or    ondansetron (ZOFRAN) injection 4 mg    heparin (porcine) injection 5,000 Units    0.45% sodium chloride infusion    piperacillin-tazobactam (ZOSYN) 3.375 g in 0.9% sodium chloride (MBP/ADV) 100 mL MBP    albuterol-ipratropium (DUO-NEB) 2.5 MG-0.5 MG/3 ML    ferrous sulfate 300 mg (60 mg iron)/5 mL oral syrup 300 mg    acetaminophen (TYLENOL) solution 650 mg      PMH:  has a past medical history of Diabetes mellitus ([facility] [address] HTN (hypertension), Hyperlipidemia, Neuropathy, PAD (peripheral artery disease) ([facility] [address] and Sciatica. PSH:   has a past surgical history that includes hx other surgical (Bilateral); hx amputation (Right); hx other surgical; hx cervical fusion; hx vascular stent (Bilateral); and hx vascular stent (Bilateral). FHX: family history includes Cancer in her mother; Diabetes in her mother; Hypertension in her mother. SHX:  reports that she has never smoked. She has never used smokeless tobacco. She reports previous alcohol use. She reports that she does not use drugs. ROS:  Unable to obtain barely unresponsive    Hemodynamics:    CO:    CI:    CVP:    SVR:   PAP Systolic:    PAP Diastolic:    PVR:    KQ05:        Ventilator Settings:      Mode Rate TV Press PEEP FiO2 PIP Min. Vent                              Vital Signs: Telemetry:    normal sinus rhythm Intake/Output:   Visit Vitals  BP (!) 171/71 (BP 1 Location: Right upper arm, BP Patient Position: At rest;Supine)   Pulse 87   Temp 98.9 °F (37.2 °C)   Resp 20   Ht 5' 7.99\" (1.727 m)   Wt 80.9 kg (178 lb 5.6 oz)   SpO2 97%   BMI 27.12 kg/m²       Temp (24hrs), Av.6 °F (37.6 °C), Min:98.2 °F (36.8 °C), Max:100.8 °F (38.2 °C)        O2 Device: None (Room air) O2 Flow Rate (L/min): 0 l/min       Wt Readings from Last 4 Encounters:   22 80.9 kg (178 lb 5.6 oz)   22 82.2 kg (181 lb 3.5 oz)   20 84.4 kg (186 lb)   20 84.8 kg (187 lb)          Intake/Output Summary (Last 24 hours) at 2022 0930  Last data filed at 2022 0556  Gross per 24 hour   Intake 2202.8 ml   Output 5250 ml   Net -3047.2 ml       Last shift:      No intake/output data recorded.   Last 3 shifts:  1901 -  0700  In: 4523.8   Out: 6250 [Urine:6250]       Physical Exam:     General: Open eyes but does not follow any command  HEENT: NCAT, poor dentition, lips and mucosa dry  Eyes: anicteric; conjunctiva clear  Neck: no nodes, trach midline; no accessory MM use. Chest: no deformity,   Cardiac: R regular; no murmur;   Lungs: distant breath sounds; no wheezes  Abd: soft, NT, hypoactive BS  Ext: no edema; no joint swelling;  No clubbing  : NO giraldo, clear urine  Neuro: She only moves her left arm  Psych- unable to assess  Skin: warm, dry, no cyanosis;   Pulses: 1-2+ Bilateral pedal, radial  Capillary: brisk; pale      DATA:    MAR reviewed and pertinent medications noted or modified as needed  MEDS:   Current Facility-Administered Medications   Medication    albuterol-ipratropium (DUO-NEB) 2.5 MG-0.5 MG/3 ML    0.9% sodium chloride infusion 250 mL    gabapentin (NEURONTIN) capsule 300 mg    0.9% sodium chloride infusion 250 mL    insulin lispro (HUMALOG) injection    insulin glargine (LANTUS) injection 22 Units    atorvastatin (LIPITOR) tablet 20 mg    latanoprost (XALATAN) 0.005 % ophthalmic solution 1 Drop    aspirin delayed-release tablet 81 mg    brimonidine (ALPHAGAN) 0.2 % ophthalmic solution 1 Drop    glucose chewable tablet 16 g    glucagon (GLUCAGEN) injection 1 mg    acetaminophen (TYLENOL) suppository 650 mg    polyethylene glycol (MIRALAX) packet 17 g    ondansetron (ZOFRAN ODT) tablet 4 mg    Or    ondansetron (ZOFRAN) injection 4 mg    heparin (porcine) injection 5,000 Units    0.45% sodium chloride infusion    piperacillin-tazobactam (ZOSYN) 3.375 g in 0.9% sodium chloride (MBP/ADV) 100 mL MBP    albuterol-ipratropium (DUO-NEB) 2.5 MG-0.5 MG/3 ML    ferrous sulfate 300 mg (60 mg iron)/5 mL oral syrup 300 mg    acetaminophen (TYLENOL) solution 650 mg        Labs:    Recent Labs     04/07/22  0814 04/06/22  1128 04/05/22  0722   WBC 15.1* 14.7* 16.9*   HGB 10.6* 9.0* 6.3*    335 300     Recent Labs     04/06/22  1128 04/05/22  0722   * 148*   K 4.8 5.0   * 120*   CO2 23 22   * 332*   BUN 55* 72*   CREA 1.47* 1.71*   CA 8.4* 8.0*   ALB 1. 7* 1.7*   ALT 78 100*     No results for input(s): PH, PCO2, PO2, HCO3, FIO2 in the last 72 hours. No results for input(s): CPK, CKNDX, TROIQ in the last 72 hours. No lab exists for component: CPKMB  No results found for: BNPP, BNP   Lab Results   Component Value Date/Time    Culture result: No growth 2 days 04/03/2022 10:43 PM    Culture result: No growth 6 days 03/05/2022 10:16 AM    Culture result: Heavy Escherichia coli 02/28/2022 08:15 PM    Culture result: Moderate  Mixed skin yasmine isolated   02/28/2022 08:15 PM     Lab Results   Component Value Date/Time    TSH 2.880 12/12/2016 10:13 AM        Imaging:    Results from [place] encounter on 04/03/22    XR CHEST PORT    Narrative  Chest single view. Comparison single view chest April 3, 2022. Lungs clear; no interstitial or alveolar pulmonary edema. Cardiac and  mediastinal structures unchanged. No pneumothorax or pleural effusion. Cervical  neck hardware. Results from [place] encounter on 04/03/22    CT HEAD WO CONT    Narrative  PROCEDURE: CT HEAD WO CONT    HISTORY:Altered mental status    COMPARISON:Head CT 3 March 2022    Department policy stipulates all CT scans at this facility are performed using  dose reduction optimization techniques as appropriate to the performed exam,  including the following: Automated exposure control, adjustments of the mA  and/or KVP according to the patient size, and the use of iterative  reconstruction technique. TECHNIQUE: Without IV contrast    Bones/extracranial soft tissues:  Maxillary sinuses and right frontal sinus  remains nearly completely opacified. Extra-axial spaces:  No hemorrhage, mass or focal fluid collection. Ventricles/sulci:  There is mild dilatation of the ventricles. Sulci are  prominent. Brain parenchyma: An area of encephalomalacia in the left frontal lobe is  showing chronic evolution without evidence of hemorrhagic conversion.  No other  focal lesions identified. No mass effect. There is good gray-white differentiation. There are  inhomogeneous areas of mildly decreased density in periventricular white matter. Impression  Chronic changes which appear stable. No acute or active intracranial process.   Chronic paranasal sinus disease

## 2022-04-07 NOTE — PROGRESS NOTES
Progress Note    Patient: Julio Alves MRN: [id number]  SSN: [id number]    YOB: 1945  Age: 78 y.o. Sex: female      Admit Date: 4/3/2022    LOS: 3 days     Subjective:   Patient followed for sepsis with suspected UTI. Blood and urine cultures pending. She remains febrile but WBC is decreasing along with procal and CRP. She was seen by Vascular Surgery with possible left great toe amputation planned. Patient remains nonverbal but appears to be resting comfortably. Daughter at the bedside. Objective:     Vitals:    04/07/22 0104 04/07/22 0544 04/07/22 0724 04/07/22 0803   BP:   (!) 171/71    Pulse:   87    Resp:   20    Temp: (!) 100.8 °F (38.2 °C) 100.4 °F (38 °C) 98.9 °F (37.2 °C)    SpO2:   98% 97%   Weight:       Height:            Intake and Output:  Current Shift: No intake/output data recorded. Last three shifts: 04/05 1901 - 04/07 0700  In: 4523.8   Out: 6250 [Urine:6250]    Physical Exam:   Vitals and nursing note reviewed. Constitutional:       General: She is not in acute distress. Appearance: She is ill-appearing. HENT: unremarkable except Poor dentition  Eyes:      Pupils: Pupils are equal, round, and reactive to light. Cardiovascular:      Rate and Rhythm: Normal rate and regular rhythm. Heart sounds: No murmur heard. Pulmonary:      Effort: Pulmonary effort is normal.      Breath sounds: Normal breath sounds. Abdominal:      General: Bowel sounds are normal.      Palpations: Abdomen is soft. Tenderness: There is no abdominal tenderness. Comments: PEG tube in place, site unremarkable   Genitourinary:     Comments: Bell catheter  Musculoskeletal:      Right lower leg: No edema. Left lower leg: No edema. Comments: Left great toe with dry gangrene, dark and shrunken   Skin: Stage 2 sacral wound     Findings: No rash. Neurological:      Mental Status: She is alert.       Comments: Unable to assess   Psychiatric:      Comments: Unable to assess      Lab/Data Review:     WBC 15,100    Procal 3.78 <4.78 <6.93  CRP 16.10 <21.40 <28.40    Blood cultures (4/3) No growth 3 days  No urine culture          Assessment:     Active Problems:    Hyperkalemia (4/4/2022)      UTI (urinary tract infection) (4/4/2022)      Sepsis ([facility] [address] (4/4/2022)      RICARDO (acute kidney injury) ([facility] [address] (4/4/2022)      AMS (altered mental status) (4/4/2022)    1. Sepsis with fever and leukocytosis, elevated procal and CRP  2. Probable UTI with marked pyuria and bacteriuria, no urine culture identified  3. Altered mentals status, multifactorial including sepsis  4. Uncontrolled diabetes mellitus with hyperglycemia  5. Chronic maxillary sinusitis  6. ASCVD with prior stroke  7. Hepatitis C antibody positive, HCV RNA negative  8. 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 Sacral wound, Stage 2, no evidence of gross infection     Comment:  Urosepsis suspected but unable to prove since no urine culture has been identified yet. Urine culture at this point would likely be false negative but will repeat to ensure resolution. WBC increased but procal and CRP decreased. Plan:   1. Continue IV Zosyn   2. Follow-up blood cultures  3. Order urine culture  4.  In am, repeat CBC, procal and CRP        Signed By: Jennifer Rodarte MD     April 7, 2022

## 2022-04-07 NOTE — PROGRESS NOTES
Problem: Diabetes Self-Management  Goal: *Disease process and treatment process  Description: Define diabetes and identify own type of diabetes; list 3 options for treating diabetes. Outcome: Progressing Towards Goal  Goal: *Incorporating nutritional management into lifestyle  Description: Describe effect of type, amount and timing of food on blood glucose; list 3 methods for planning meals. Outcome: Progressing Towards Goal     Problem: Falls - Risk of  Goal: *Absence of Falls  Description: Document Beatriz Locket Fall Risk and appropriate interventions in the flowsheet.   Outcome: Progressing Towards Goal  Note: Fall Risk Interventions:  Mobility Interventions: Communicate number of staff needed for ambulation/transfer    Mentation Interventions: Bed/chair exit alarm,Door open when patient unattended         Elimination Interventions: Call light in reach,Patient to call for help with toileting needs

## 2022-04-07 NOTE — PROGRESS NOTES
PROGRESS NOTE    Patient: Lavell Wu MRN: [id number]  SSN: [id number]    YOB: 1945  Age: 78 y.o. Sex: female      Admit Date: 4/3/2022    LOS: 3 days       Subjective     Chief Complaint   Patient presents with    Altered mental status       HPI: Patient is a 78y.o. 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 04/05   Patient is seen at bedside with daughter and nephew today. Patient is in distress due to pain and daughter notes that patient gets Neurontin TID for neuropathy daily. Otherwise, patient is still receiving enteric feeding through PEG tube and getting IVF. Patient was seen by nephrology yesterday. Recommends obtaining renal panel and continuing IVF. Patient was seen by ID yesterday. Recommends continuing IV vancomycin for urosepsis. Patient was seen by pulmonology today. Recommends PRBC transfusion. Labs indicate WBC 16.9, Hgb 6.3, Na 148, Cl 120, BUN 72, Cr 1.71, Ca 8, CRP 28.4, pro-carrie 6.93 and .       04/06   Patient is seen at bedside. Patient received 2 units of PRBC. Patient is on Zosyn. No new changes today. Patient seen by the vascular surgeon he recommend great toe amputation  And for sacral wound recommend Medihoney ointment     Labs show increasing Hgb of 9, decreasing WBC 14.7, Na 147, , BUN 55,   Cr 1.47, and CRP 21.4. CXR is unremarkable. 04/07  Patient is seen resting at bedside.  Patient Phone call to patient and left another message for her to return call.    received one unit of PRBC with increased Hgb of 10.6. No acute changes today. Patient is seen by nephrology who recommends free water flushes to correct hypernatremia. Labs show increasing Hgb of 10.6, WBC 15.1, pro-calcitonin 3.78, and CRP 16.1. ROS  Unable to obtain. Objective     Visit Vitals  BP (!) 171/71 (BP 1 Location: Right upper arm, BP Patient Position: At rest;Supine)   Pulse 87   Temp 98.9 °F (37.2 °C)   Resp 20   Ht 5' 7.99\" (1.727 m)   Wt 178 lb 5.6 oz (80.9 kg)   SpO2 97%   BMI 27.12 kg/m²    O2 Flow Rate (L/min): 0 l/min O2 Device: None (Room air)    Physical Exam:   Physical Exam  Constitutional: Open eyes do not follow any commands. Tracks with eyes. HENT:   Head: Normocephalic and atraumatic. Eyes: Pupils are equal, round, and reactive to light. EOM are normal.   Cardiovascular: Normal rate, regular rhythm and normal heart sounds. Pulmonary/Chest: Breath sounds normal. No wheezes. No rales. Exhibits no tenderness. Abdominal: Soft. Bowel sounds are normal. There is no abdominal tenderness. There is no rebound and no guarding. Musculoskeletal: Normal range of motion. Neurological: Open eyes do not follow any, right-sided weakness  Intake & Output:  Current Shift: No intake/output data recorded. Last three shifts: 04/05 1901 - 04/07 0700  In: 4523.8   Out: 6250 [Urine:6250]    Lab/Data Review: All lab results for the last 24 hours reviewed.        24 Hour Results:    Recent Results (from the past 24 hour(s))   C REACTIVE PROTEIN, QT    Collection Time: 04/06/22 11:28 AM   Result Value Ref Range    C-Reactive protein 21.40 (H) 0.00 - 0.60 mg/dL   CBC WITH AUTOMATED DIFF    Collection Time: 04/06/22 11:28 AM   Result Value Ref Range    WBC 14.7 (H) 3.6 - 11.0 K/uL    RBC 3.24 (L) 3.80 - 5.20 M/uL    HGB 9.0 (L) 11.5 - 16.0 g/dL    HCT 28.6 (L) 35.0 - 47.0 %    MCV 88.3 80.0 - 99.0 FL    MCH 27.8 26.0 - 34.0 PG    MCHC 31.5 30.0 - 36.5 g/dL    RDW 17.1 (H) 11.5 - 14.5 % PLATELET 396 758 - 736 K/uL    MPV 11.2 8.9 - 12.9 FL    NRBC 0.5 (H) 0.0  WBC    ABSOLUTE NRBC 0.07 (H) 0.00 - 0.01 K/uL    NEUTROPHILS 76 (H) 32 - 75 %    LYMPHOCYTES 16 12 - 49 %    MONOCYTES 4 (L) 5 - 13 %    EOSINOPHILS 2 0 - 7 %    BASOPHILS 1 0 - 1 %    IMMATURE GRANULOCYTES 1 (H) 0 - 0.5 %    ABS. NEUTROPHILS 11.4 (H) 1.8 - 8.0 K/UL    ABS. LYMPHOCYTES 2.3 0.8 - 3.5 K/UL    ABS. MONOCYTES 0.6 0.0 - 1.0 K/UL    ABS. EOSINOPHILS 0.2 0.0 - 0.4 K/UL    ABS. BASOPHILS 0.1 0.0 - 0.1 K/UL    ABS. IMM. GRANS. 0.1 (H) 0.00 - 0.04 K/UL    DF AUTOMATED     PROCALCITONIN    Collection Time: 04/06/22 11:28 AM   Result Value Ref Range    Procalcitonin 4.78 (H) 0 ng/mL   METABOLIC PANEL, COMPREHENSIVE    Collection Time: 04/06/22 11:28 AM   Result Value Ref Range    Sodium 147 (H) 136 - 145 mmol/L    Potassium 4.8 3.5 - 5.1 mmol/L    Chloride 119 (H) 97 - 108 mmol/L    CO2 23 21 - 32 mmol/L    Anion gap 5 5 - 15 mmol/L    Glucose 353 (H) 65 - 100 mg/dL    BUN 55 (H) 6 - 20 mg/dL    Creatinine 1.47 (H) 0.55 - 1.02 mg/dL    BUN/Creatinine ratio 37 (H) 12 - 20      GFR est AA 42 (L) >60 ml/min/1.73m2    GFR est non-AA 35 (L) >60 ml/min/1.73m2    Calcium 8.4 (L) 8.5 - 10.1 mg/dL    Bilirubin, total 0.5 0.2 - 1.0 mg/dL    AST (SGOT) 88 (H) 15 - 37 U/L    ALT (SGPT) 78 12 - 78 U/L    Alk.  phosphatase 145 (H) 45 - 117 U/L    Protein, total 6.8 6.4 - 8.2 g/dL    Albumin 1.7 (L) 3.5 - 5.0 g/dL    Globulin 5.1 (H) 2.0 - 4.0 g/dL    A-G Ratio 0.3 (L) 1.1 - 2.2     GLUCOSE, POC    Collection Time: 04/06/22 11:31 AM   Result Value Ref Range    Glucose (POC) 354 (H) 65 - 117 mg/dL    Performed by Ellie Palomares    GLUCOSE, POC    Collection Time: 04/06/22  2:33 PM   Result Value Ref Range    Glucose (POC) 343 (H) 65 - 117 mg/dL    Performed by [address] [place] [address], POC    Collection Time: 04/06/22  4:49 PM   Result Value Ref Range    Glucose (POC) 295 (H) 65 - 117 mg/dL    Performed by Ellie Palomares    GLUCOSE, POC Collection Time: 04/06/22  8:40 PM   Result Value Ref Range    Glucose (POC) 303 (H) 65 - 117 mg/dL    Performed by Prema Garcia, POC    Collection Time: 04/06/22 11:57 PM   Result Value Ref Range    Glucose (POC) 296 (H) 65 - 117 mg/dL    Performed by Yovanny Pan, POC    Collection Time: 04/07/22  5:31 AM   Result Value Ref Range    Glucose (POC) 303 (H) 65 - 117 mg/dL    Performed by Yovanny Pan, POC    Collection Time: 04/07/22  7:31 AM   Result Value Ref Range    Glucose (POC) 293 (H) 65 - 117 mg/dL    Performed by Carolin Fleming          Imaging:    XR CHEST PORT   Final Result      CT HEAD WO CONT   Final Result   Chronic changes which appear stable. No acute or active intracranial process. Chronic paranasal sinus disease         XR CHEST PORT   Final Result   No acute findings. Assessment   Severe sepsis with UTI  UTI  Acute on chronic kidney disease  Hyperkalemia  Hypernatremia  CVA with right-sided weakness  Anemia of chronic disease  Gangrene of the left great toe  Sacral decubitus ulcer    Plan    IV Zosyn   sliding scale insulin  Aspirin 81 mg daily  Lipitor 20 mg daily  Alphagan 0.2% 3 times a day  Ferrous sulfate 325 mg twice a day  Heparin 5000 units subcu every 8 hours  Half-normal saline at 100 cc/h  Duo-neb 2.5 mg-0.5 mg PRN  Daily renal panel per nephrology recommendation. Full code. Start on half normal saline IVF.   Pulmonary nephrology infectious disease and vascular surgical.  Follow-up with the vascular surgeon regarding amputation of the left great toe    Discussed with the patient daughter at the bedside    Current Facility-Administered Medications:     albuterol-ipratropium (DUO-NEB) 2.5 MG-0.5 MG/3 ML, 3 mL, Nebulization, Q6HWA RT, Martínez Crespo MD, 3 mL at 04/07/22 0803    0.9% sodium chloride infusion 250 mL, 250 mL, IntraVENous, PRN, Martínez Crespo MD    gabapentin (NEURONTIN) capsule 300 mg, 300 mg, Oral, TID, Sera Posada MD, 300 mg at 04/07/22 0802    0.9% sodium chloride infusion 250 mL, 250 mL, IntraVENous, PRN, Catrina Soto MD    insulin lispro (HUMALOG) injection, , SubCUTAneous, Q6H, Israel Soto MD, 10 Units at 04/07/22 0542    insulin glargine (LANTUS) injection 22 Units, 22 Units, SubCUTAneous, DAILY, Israel Soto MD, 22 Units at 04/07/22 0801    atorvastatin (LIPITOR) tablet 20 mg, 20 mg, Oral, QHS, Israel Soto MD, 20 mg at 04/06/22 2154    latanoprost (XALATAN) 0.005 % ophthalmic solution 1 Drop, 1 Drop, Right Eye, QPM, Sera Posada MD, 1 Drop at 04/06/22 1723    aspirin delayed-release tablet 81 mg, 81 mg, Oral, DAILY, Israel Soto MD, 81 mg at 04/07/22 0802    brimonidine (ALPHAGAN) 0.2 % ophthalmic solution 1 Drop, 1 Drop, Both Eyes, TID, Israel Soto MD, 1 Drop at 04/07/22 0900    glucose chewable tablet 16 g, 4 Tablet, Oral, PRN, Catrina Soto MD    glucagon (GLUCAGEN) injection 1 mg, 1 mg, IntraMUSCular, PRN, Sera Posada MD  [facility]  [DISCONTINUED] acetaminophen (TYLENOL) tablet 650 mg, 650 mg, Oral, Q6H PRN, 650 mg at 04/04/22 2246 **OR** acetaminophen (TYLENOL) suppository 650 mg, 650 mg, Rectal, Q6H PRN, Catrina Soto MD    polyethylene glycol (MIRALAX) packet 17 g, 17 g, Oral, DAILY PRN, Catrina Soto MD    ondansetron (ZOFRAN ODT) tablet 4 mg, 4 mg, Oral, Q8H PRN **OR** ondansetron (ZOFRAN) injection 4 mg, 4 mg, IntraVENous, Q6H PRN, Israel Soto MD    heparin (porcine) injection 5,000 Units, 5,000 Units, SubCUTAneous, Q8H, Israel Soto MD, 5,000 Units at 04/07/22 0542    0.45% sodium chloride infusion, 100 mL/hr, IntraVENous, CONTINUOUS, Israel Soto MD, Last Rate: 100 mL/hr at 04/05/22 0555, 100 mL/hr at 04/05/22 0555    piperacillin-tazobactam (ZOSYN) 3.375 g in 0.9% sodium chloride (MBP/ADV) 100 mL MBP, 3.375 g, IntraVENous, Q8H, Israel Soto MD, Last Rate: 25 mL/hr at 04/07/22 0802, 3.375 g at 04/07/22 0802    albuterol-ipratropium (DUO-NEB) 2.5 MG-0.5 MG/3 ML, 3 mL, Nebulization, Q6H PRN, Fernando Soto MD    ferrous sulfate 300 mg (60 mg iron)/5 mL oral syrup 300 mg, 300 mg, Oral, BID, Israel Soto MD, 300 mg at 04/07/22 0802    acetaminophen (TYLENOL) solution 650 mg, 650 mg, Per NG tube, Q6H PRN, Israel Soto MD, 650 mg at 04/07/22 0544    Current Outpatient Medications   Medication Instructions    acidophilus-pectin, citrus 25 million cell -100 mg tab tablet 2 Tablets, Oral, DAILY    amLODIPine (NORVASC) 5 mg, Oral, DAILY    artificial saliva (MOUTH KOTE) spra 1 Spray, Oral, 3 TIMES DAILY    aspirin delayed-release 81 mg tablet Oral, DAILY    atorvastatin (LIPITOR) 20 mg tablet TAKE 1 TABLET BY MOUTH EVERY NIGHT    brimonidine (ALPHAGAN) 0.2 % ophthalmic solution 1 Drop, Both Eyes, 3 TIMES DAILY    ferrous sulfate 325 mg, Oral, 2 TIMES DAILY    gabapentin (NEURONTIN) 300 mg, Oral, 2 TIMES DAILY    insulin glargine (LANTUS) 50 Units, SubCUTAneous, DAILY    latanoprost (XALATAN) 0.005 % ophthalmic solution 1 Drop, Right Eye, EVERY EVENING    lidocaine (XYLOCAINE) 4 % (40 mg/mL) topical solution 1 mL, Topical, AS NEEDED    metoprolol succinate (TOPROL-XL) 50 mg, Oral, 2 TIMES DAILY         Signed By: Haydee Atkins     April 7, 2022

## 2022-04-07 NOTE — PROGRESS NOTES
PROGRESS NOTE    Patient: Lorraine Fu MRN: [id number]  SSN: [id number]    YOB: 1945  Age: 78 y.o. Sex: female      Admit Date: 4/3/2022    LOS: 3 days       Subjective     Chief Complaint   Patient presents with    Altered mental status       HPI: Patient is a 78y.o. 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 04/05   Patient is seen at bedside with daughter and nephew today. Patient is in distress due to pain and daughter notes that patient gets Neurontin TID for neuropathy daily. Otherwise, patient is still receiving enteric feeding through PEG tube and getting IVF. Patient was seen by nephrology yesterday. Recommends obtaining renal panel and continuing IVF. Patient was seen by ID yesterday. Recommends continuing IV vancomycin for urosepsis. Patient was seen by pulmonology today. Recommends PRBC transfusion. Labs indicate WBC 16.9, Hgb 6.3, Na 148, Cl 120, BUN 72, Cr 1.71, Ca 8, CRP 28.4, pro-carrie 6.93 and .       04/06   Patient is seen at bedside. Patient received 2 units of PRBC. Patient is on Zosyn. No new changes today. Patient seen by the vascular surgeon he recommend great toe amputation  And for sacral wound recommend Medihoney ointment     Labs show increasing Hgb of 9, decreasing WBC 14.7, Na 147, , BUN 55,   Cr 1.47, and CRP 21.4. CXR is unremarkable. 04/07  Patient is seen resting at bedside.  Patient received one unit of PRBC with increased Hgb of 10.6. No acute changes today. Patient is seen by nephrology who recommends free water flushes to correct hypernatremia. Labs show increasing Hgb of 10.6, WBC 15.1, pro-calcitonin 3.78, and CRP 16.1. ROS  Unable to obtain. Objective     Visit Vitals  BP (!) 171/71 (BP 1 Location: Right upper arm, BP Patient Position: At rest;Supine)   Pulse 87   Temp 98.9 °F (37.2 °C)   Resp 20   Ht 5' 7.99\" (1.727 m)   Wt 80.9 kg (178 lb 5.6 oz)   SpO2 97%   BMI 27.12 kg/m²    O2 Flow Rate (L/min): 0 l/min O2 Device: None (Room air)    Physical Exam:   Physical Exam  Constitutional: Open eyes do not follow any commands. Tracks with eyes. HENT:   Head: Normocephalic and atraumatic. Eyes: Pupils are equal, round, and reactive to light. EOM are normal.   Cardiovascular: Normal rate, regular rhythm and normal heart sounds. Pulmonary/Chest: Breath sounds normal. No wheezes. No rales. Exhibits no tenderness. Abdominal: Soft. Bowel sounds are normal. There is no abdominal tenderness. There is no rebound and no guarding. Musculoskeletal: Normal range of motion. Neurological: Open eyes do not follow any, right-sided weakness  Intake & Output:  Current Shift: No intake/output data recorded. Last three shifts: 04/05 1901 - 04/07 0700  In: 4523.8   Out: 6250 [Urine:6250]    Lab/Data Review: All lab results for the last 24 hours reviewed.        24 Hour Results:    Recent Results (from the past 24 hour(s))   GLUCOSE, POC    Collection Time: 04/06/22  2:33 PM   Result Value Ref Range    Glucose (POC) 343 (H) 65 - 117 mg/dL    Performed by [address] [place] [address], POC    Collection Time: 04/06/22  4:49 PM   Result Value Ref Range    Glucose (POC) 295 (H) 65 - 117 mg/dL    Performed by Jayjay Schmitt    GLUCOSE, POC    Collection Time: 04/06/22  8:40 PM   Result Value Ref Range    Glucose (POC) 303 (H) 65 - 117 mg/dL    Performed by Fany Cloud POC Collection Time: 04/06/22 11:57 PM   Result Value Ref Range    Glucose (POC) 296 (H) 65 - 117 mg/dL    Performed by Wes Amaro    GLUCOSE, POC    Collection Time: 04/07/22  5:31 AM   Result Value Ref Range    Glucose (POC) 303 (H) 65 - 117 mg/dL    Performed by Yue Monroy, POC    Collection Time: 04/07/22  7:31 AM   Result Value Ref Range    Glucose (POC) 293 (H) 65 - 117 mg/dL    Performed by Yeimi Orellana    CBC WITH AUTOMATED DIFF    Collection Time: 04/07/22  8:14 AM   Result Value Ref Range    WBC 15.1 (H) 3.6 - 11.0 K/uL    RBC 3.83 3.80 - 5.20 M/uL    HGB 10.6 (L) 11.5 - 16.0 g/dL    HCT 33.9 (L) 35.0 - 47.0 %    MCV 88.5 80.0 - 99.0 FL    MCH 27.7 26.0 - 34.0 PG    MCHC 31.3 30.0 - 36.5 g/dL    RDW 17.6 (H) 11.5 - 14.5 %    PLATELET 452 570 - 805 K/uL    MPV 11.3 8.9 - 12.9 FL    NRBC 0.5 (H) 0.0  WBC    ABSOLUTE NRBC 0.07 (H) 0.00 - 0.01 K/uL    NEUTROPHILS 74 32 - 75 %    LYMPHOCYTES 17 12 - 49 %    MONOCYTES 5 5 - 13 %    EOSINOPHILS 2 0 - 7 %    BASOPHILS 1 0 - 1 %    IMMATURE GRANULOCYTES 1 (H) 0 - 0.5 %    ABS. NEUTROPHILS 11.2 (H) 1.8 - 8.0 K/UL    ABS. LYMPHOCYTES 2.6 0.8 - 3.5 K/UL    ABS. MONOCYTES 0.7 0.0 - 1.0 K/UL    ABS. EOSINOPHILS 0.3 0.0 - 0.4 K/UL    ABS. BASOPHILS 0.1 0.0 - 0.1 K/UL    ABS. IMM. GRANS. 0.1 (H) 0.00 - 0.04 K/UL    DF AUTOMATED     C REACTIVE PROTEIN, QT    Collection Time: 04/07/22  8:14 AM   Result Value Ref Range    C-Reactive protein 16.10 (H) 0.00 - 0.60 mg/dL   PROCALCITONIN    Collection Time: 04/07/22  8:14 AM   Result Value Ref Range    Procalcitonin 3.78 (H) 0 ng/mL   GLUCOSE, POC    Collection Time: 04/07/22 11:59 AM   Result Value Ref Range    Glucose (POC) 312 (H) 65 - 117 mg/dL    Performed by Yeimi Orellana          Imaging:    XR CHEST PORT   Final Result      CT HEAD WO CONT   Final Result   Chronic changes which appear stable. No acute or active intracranial process.    Chronic paranasal sinus disease         XR CHEST PORT Final Result   No acute findings. Assessment   Severe sepsis with UTI  UTI  Acute on chronic kidney disease  Hyperkalemia  Hypernatremia  CVA with right-sided weakness  Anemia of chronic disease  Gangrene of the left great toe  Sacral decubitus ulcer    Plan    IV Zosyn   sliding scale insulin  Aspirin 81 mg daily  Lipitor 20 mg daily  Alphagan 0.2% 3 times a day  Ferrous sulfate 325 mg twice a day  Heparin 5000 units subcu every 8 hours  Half-normal saline at 100 cc/h  Duo-neb 2.5 mg-0.5 mg PRN  Daily renal panel per nephrology recommendation. Full code. Start on half normal saline IVF.   Pulmonary nephrology infectious disease and vascular surgical.  Follow-up with the vascular surgeon regarding amputation of the left great toe    Discussed with the patient daughter at the bedside    Current Facility-Administered Medications:     albuterol-ipratropium (DUO-NEB) 2.5 MG-0.5 MG/3 ML, 3 mL, Nebulization, Q6HWA RT, Martínez Crespo MD, 3 mL at 04/07/22 0803    0.9% sodium chloride infusion 250 mL, 250 mL, IntraVENous, PRN, Martínez Crespo MD    gabapentin (NEURONTIN) capsule 300 mg, 300 mg, Oral, TID, Israel Soto MD, 300 mg at 04/07/22 0802    0.9% sodium chloride infusion 250 mL, 250 mL, IntraVENous, PRN, Israel Soto MD    insulin lispro (HUMALOG) injection, , SubCUTAneous, Q6H, Israel Soto MD, 10 Units at 04/07/22 1226    insulin glargine (LANTUS) injection 22 Units, 22 Units, SubCUTAneous, DAILY, Israel Soto MD, 22 Units at 04/07/22 0801    atorvastatin (LIPITOR) tablet 20 mg, 20 mg, Oral, QHS, Israel Soto MD, 20 mg at 04/06/22 2154    latanoprost (XALATAN) 0.005 % ophthalmic solution 1 Drop, 1 Drop, Right Eye, QPM, Israel Soto MD, 1 Drop at 04/06/22 1723    aspirin delayed-release tablet 81 mg, 81 mg, Oral, DAILY, Israel Soto MD, 81 mg at 04/07/22 0802    brimonidine (ALPHAGAN) 0.2 % ophthalmic solution 1 Drop, 1 Drop, Both Eyes, TID, Brittany, Eunice Westfall MD, 1 Drop at 04/07/22 0900    glucose chewable tablet 16 g, 4 Tablet, Oral, PRN, Eunice Soto MD    glucagon [facility]) injection 1 mg, 1 mg, IntraMUSCular, PRN, Tamera Chaudhary MD   [facility]  [DISCONTINUED] acetaminophen (TYLENOL) tablet 650 mg, 650 mg, Oral, Q6H PRN, 650 mg at 04/04/22 2246 **OR** acetaminophen (TYLENOL) suppository 650 mg, 650 mg, Rectal, Q6H PRN, Eunice Soto MD    polyethylene glycol (MIRALAX) packet 17 g, 17 g, Oral, DAILY PRN, Eunice Soto MD    ondansetron (ZOFRAN ODT) tablet 4 mg, 4 mg, Oral, Q8H PRN **OR** ondansetron (ZOFRAN) injection 4 mg, 4 mg, IntraVENous, Q6H PRN, Israel Soto MD    heparin (porcine) injection 5,000 Units, 5,000 Units, SubCUTAneous, Q8H, Israel Soto MD, 5,000 Units at 04/07/22 0542    0.45% sodium chloride infusion, 100 mL/hr, IntraVENous, CONTINUOUS, Israel Soto MD, Last Rate: 100 mL/hr at 04/05/22 0555, 100 mL/hr at 04/05/22 0555    piperacillin-tazobactam (ZOSYN) 3.375 g in 0.9% sodium chloride (MBP/ADV) 100 mL MBP, 3.375 g, IntraVENous, Q8H, Israel Soto MD, Last Rate: 25 mL/hr at 04/07/22 0802, 3.375 g at 04/07/22 0802    albuterol-ipratropium (DUO-NEB) 2.5 MG-0.5 MG/3 ML, 3 mL, Nebulization, Q6H PRN, Eunice Soto MD    ferrous sulfate 300 mg (60 mg iron)/5 mL oral syrup 300 mg, 300 mg, Oral, BID, Israel Soto MD, 300 mg at 04/07/22 0802    acetaminophen (TYLENOL) solution 650 mg, 650 mg, Per NG tube, Q6H PRN, Israel Soto MD, 650 mg at 04/07/22 0544    Current Outpatient Medications   Medication Instructions    acidophilus-pectin, citrus 25 million cell -100 mg tab tablet 2 Tablets, Oral, DAILY    amLODIPine (NORVASC) 5 mg, Oral, DAILY    artificial saliva (MOUTH KOTE) spra 1 Spray, Oral, 3 TIMES DAILY    aspirin delayed-release 81 mg tablet Oral, DAILY    atorvastatin (LIPITOR) 20 mg tablet TAKE 1 TABLET BY MOUTH EVERY NIGHT    brimonidine (ALPHAGAN) 0.2 % ophthalmic solution 1 Drop, Both Eyes, 3 TIMES DAILY    ferrous sulfate 325 mg, Oral, 2 TIMES DAILY    gabapentin (NEURONTIN) 300 mg, Oral, 2 TIMES DAILY    insulin glargine (LANTUS) 50 Units, SubCUTAneous, DAILY    latanoprost (XALATAN) 0.005 % ophthalmic solution 1 Drop, Right Eye, EVERY EVENING    lidocaine (XYLOCAINE) 4 % (40 mg/mL) topical solution 1 mL, Topical, AS NEEDED    metoprolol succinate (TOPROL-XL) 50 mg, Oral, 2 TIMES DAILY         Signed By: Yola Gunn MD     April 7, 2022

## 2022-04-07 NOTE — PROGRESS NOTES
PROGRESS NOTE      Chief Complaints:  Patient examined. HPI and  Objective:    Patient is a somewhat confused and not quite coherent. Patient had a febrile episode last night 100.8 Fahrenheit for temperature. Vascular examination no changes. and left great toe dry gangrene about the same. Review of Systems:  Unable to assess due to mental status. EXAM:  Visit Vitals  BP (!) 171/71 (BP 1 Location: Right upper arm, BP Patient Position: At rest;Supine)   Pulse 87   Temp 98.9 °F (37.2 °C)   Resp 20   Ht 5' 7.99\" (1.727 m)   Wt 178 lb 5.6 oz (80.9 kg)   SpO2 97%   BMI 27.12 kg/m²       Patient is awake   Head and neck atraumatic, normocephalic. ENT: No hoarse voice  Cardiac system regular rate rhythm. Pulmonary no audible wheeze  Chest wall excursion normal with respiration cycle  Abdomen is soft not particularly distended. Neurologically nonfocal.  Skin is warm and moist.  Psychosocial: Unable to assess  Vascular examination as previously noted no changes.     Recent Results (from the past 24 hour(s))   GLUCOSE, POC    Collection Time: 04/06/22  4:49 PM   Result Value Ref Range    Glucose (POC) 295 (H) 65 - 117 mg/dL    Performed by Mitzi Fields    GLUCOSE, POC    Collection Time: 04/06/22  8:40 PM   Result Value Ref Range    Glucose (POC) 303 (H) 65 - 117 mg/dL    Performed by Autumn Vera, POC    Collection Time: 04/06/22 11:57 PM   Result Value Ref Range    Glucose (POC) 296 (H) 65 - 117 mg/dL    Performed by Hermila Ortiz    GLUCOSE, POC    Collection Time: 04/07/22  5:31 AM   Result Value Ref Range    Glucose (POC) 303 (H) 65 - 117 mg/dL    Performed by Hermila Ortiz    GLUCOSE, POC    Collection Time: 04/07/22  7:31 AM   Result Value Ref Range    Glucose (POC) 293 (H) 65 - 117 mg/dL    Performed by [employer]    CBC WITH AUTOMATED DIFF    Collection Time: 04/07/22  8:14 AM   Result Value Ref Range    WBC 15.1 (H) 3.6 - 11.0 K/uL    RBC 3.83 3.80 - 5.20 M/uL    HGB 10.6 (L) 11.5 - 16.0 g/dL    HCT 33.9 (L) 35.0 - 47.0 %    MCV 88.5 80.0 - 99.0 FL    MCH 27.7 26.0 - 34.0 PG    MCHC 31.3 30.0 - 36.5 g/dL    RDW 17.6 (H) 11.5 - 14.5 %    PLATELET 412 959 - 291 K/uL    MPV 11.3 8.9 - 12.9 FL    NRBC 0.5 (H) 0.0  WBC    ABSOLUTE NRBC 0.07 (H) 0.00 - 0.01 K/uL    NEUTROPHILS 74 32 - 75 %    LYMPHOCYTES 17 12 - 49 %    MONOCYTES 5 5 - 13 %    EOSINOPHILS 2 0 - 7 %    BASOPHILS 1 0 - 1 %    IMMATURE GRANULOCYTES 1 (H) 0 - 0.5 %    ABS. NEUTROPHILS 11.2 (H) 1.8 - 8.0 K/UL    ABS. LYMPHOCYTES 2.6 0.8 - 3.5 K/UL    ABS. MONOCYTES 0.7 0.0 - 1.0 K/UL    ABS. EOSINOPHILS 0.3 0.0 - 0.4 K/UL    ABS. BASOPHILS 0.1 0.0 - 0.1 K/UL    ABS. IMM. GRANS. 0.1 (H) 0.00 - 0.04 K/UL    DF AUTOMATED     C REACTIVE PROTEIN, QT    Collection Time: 04/07/22  8:14 AM   Result Value Ref Range    C-Reactive protein 16.10 (H) 0.00 - 0.60 mg/dL   PROCALCITONIN    Collection Time: 04/07/22  8:14 AM   Result Value Ref Range    Procalcitonin 3.78 (H) 0 ng/mL   GLUCOSE, POC    Collection Time: 04/07/22 11:59 AM   Result Value Ref Range    Glucose (POC) 312 (H) 65 - 117 mg/dL    Performed by Paulo Spence        ASSESSMENT:   Patient is 78 y.o. with diagnosis of : Active Problems:    Hyperkalemia (4/4/2022)      UTI (urinary tract infection) (4/4/2022)      Sepsis ([facility] [address] (4/4/2022)      RICARDO (acute kidney injury) ([facility] [address] (4/4/2022)      AMS (altered mental status) (4/4/2022)        PLAN:                 Patient had a vascular duplex exam back in March. And patient had left leg angiogram done at [facility].  Patient has a left great toe gangrene. I have spoken to. Patient's daughter the other day and we decided to leave it alone since his dry gangrene. and since he has already proceeded on the left leg 2 weeks ago we will keep an eye on how much it heals. Patient will need a left leg angiogram with interventions at some point. Currently patient is having febrile episodes and quite septic.       Most likely right leg angiogram and the intervention will be done as outpatient once patient recovered from his hospitalization. Please apply iodine soaks on the left great toe area daily. Patient also has sacral wounds need to be covered with a debriding ointments. I will continue monitor her progress.

## 2022-04-08 NOTE — PROGRESS NOTES
Paged Dr. Verlean Fleischer regarding pt BP of 217/119, RR of 40, Temp of 100.2f, and coarse lung sounds bilaterally. A STAT chest xray was ordered, and 40mg of IV lasix ordered. Awaiting verification by pharmacy.

## 2022-04-08 NOTE — PROGRESS NOTES
Progress Note    Patient: Soheila Rodriguez MRN: [id number]  SSN: [id number]    YOB: 1945  Age: 78 y.o. Sex: female      Admit Date: 4/3/2022    LOS: 4 days     Subjective:   Patient followed for sepsis with suspected UTI. She has been transferred to CVICU after rapid response called because of respiratory distress. She is currently on Mask. Repeat CXR shows clear lungs. Blood and urine cultures still pending. She is now febrile again with increasing WBC, though procal and CRP decreasing. Patient remains nonverbal and in moderate respiratory distress. Daughters at the bedside. Objective:     Vitals:    04/08/22 0528 04/08/22 0700 04/08/22 0724 04/08/22 0906   BP: (!) 189/93  (!) 152/75    Pulse: (!) 110      Resp: 30  (!) 32    Temp: 98 °F (36.7 °C) (!) 101.7 °F (38.7 °C) (!) 100.9 °F (38.3 °C) (!) 101.9 °F (38.8 °C)   SpO2: 95%      Weight:       Height:            Intake and Output:  Current Shift: No intake/output data recorded. Last three shifts: 04/06 1901 - 04/08 0700  In: 1624   Out: 4525 [Urine:4525]    Physical Exam:   Vitals and nursing note reviewed. Constitutional:       General: She is not in acute distress. Appearance: She is ill-appearing. HENT: Ventimask  Eyes:      Pupils: Pupils are equal, round, and reactive to light. Cardiovascular:      Rate and Rhythm: Normal rate and regular rhythm. Heart sounds: No murmur heard. Pulmonary:      Effort: Pulmonary effort is normal.      Breath sounds: Normal breath sounds. Abdominal:      General: Bowel sounds are normal.      Palpations: Abdomen is soft. Tenderness: There is no abdominal tenderness. Comments: PEG tube in place, site unremarkable   Genitourinary:     Comments: Bell catheter  Musculoskeletal:      Right lower leg: No edema. Left lower leg: No edema. Comments: Left great toe with dry gangrene, dark and shrunken   Skin: Stage 2 sacral wound     Findings: No rash. Neurological:      Mental Status: She is alert. Comments: Unable to assess   Psychiatric:      Comments: Unable to assess      Lab/Data Review:     WBC 17,600    Procal 3.37 <3.78 <4.78 <6.93  CRP 14.50 <16.10 <21.40 <28.40    Blood cultures (4/3) No growth 3 days  Urine culture (4/7) Pending      CXR (4/8) Lungs clear; no interstitial or alveolar pulmonary edema. Cardiac and  mediastinal structures unchanged. No pneumothorax or pleural effusion. Cervical neck hardware. Assessment:     Active Problems:    Hyperkalemia (4/4/2022)      UTI (urinary tract infection) (4/4/2022)      Sepsis ([facility] [address] (4/4/2022)      RICARDO (acute kidney injury) ([facility] [address] (4/4/2022)      AMS (altered mental status) (4/4/2022)    1. Sepsis with fever and leukocytosis, elevated procal and CRP  2. Probable UTI with marked pyuria and bacteriuria,  urine culture pending  3. Altered mentals status, multifactorial including sepsis  4. Uncontrolled diabetes mellitus with hyperglycemia  5. Chronic maxillary sinusitis  6. ASCVD with prior stroke  7. Hepatitis C antibody positive, HCV RNA negative  8. 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 Sacral wound, Stage 2, no evidence of gross infection  10. Acute hypoxic respiratory failure, on Ventimask     Comment:  Urosepsis suspected with urine culture pending, however, concerned about new fever with increasing WBC, though now onSolumedrol. Yeasts were seen on initial urinalysis. Etiology of respiratory failure unclear since CXR remains unremarkable. Possible aspiration. Plan:   1. Continue IV Zosyn   2. Repeat blood cultures stat  3. Start IV Vancomycin and Fluconazole  4. Follow-up blood cultures and urine culture  5.  In am, repeat CBC, procal and CRP        Signed By: Tayla Almonte MD     April 8, 2022

## 2022-04-08 NOTE — PROGRESS NOTES
Patient transferred from [facility] to CVU bed 282 at 0900. Patient is s/p rapid response earlier this morning d/t tachypnea. On assessment, the patient is responsive to stimuli with snoring respirations, tachypneic, tachycardic and febrile (see VS flowsheets). Accucheck 407. Per 5W RN, patient was made strict NPO d/t concerns for aspiration. Spoke with Dr. Princess Wild about patient condition. Orders received to continue NPO status except for tylenol. 10 units of humalog now and PRN labetalol also ordered. Simple O2 facemask applied. 1012 - PRN labetalol and tylenol given in addition to 0700 zosyn dose and scheduled lasix. Patient's respirations now more shallow. Dr. Gomez Bo at bedside, aware of patient condition, confirmed with patient's family at bedside that patient is to remain full code and to be intubated if needed.

## 2022-04-08 NOTE — PROGRESS NOTES
Comprehensive Nutrition Assessment    Type and Reason for Visit: Reassess (interim)    Nutrition Recommendations/Plan:   Initiate TF via PEG of Glucerna 1.5 at goal rate of 60ml/hr   Flush with 175ml water q4hrs  Provides 2160kcls (100%), 119g protein (105%), and 2144ml water (100%)     Hx diarrhea-- consider fibercon as Fiber supplement     Nutrition Assessment:  Admitted for AMS 2/2 UTI. RD familiar with pt from last admit for CVA c/b by hematuria and gangrenous L foot. PEG in place, TF started (4/4). Hypertensive and tachypnic this AM, transferred to ICU. Per ICU RN, pt TF held all day d/t concern for aspiration pna aeb fever (102.2F Tmax) though CXR shows lungs are clear. Discussed restarting TF as able, no changes in regimen. Noted that TF not advanced past 50ml/hr while pt on medical unit, unsure why. Labs: Na 151, K+ 5.2, BUN 47, Cr 1.41, -416, AST 52, Alk Phos 156, Alb 1.8. Meds: FeSu, fluconazole, lasix, insulin, Zosyn, Vancomycin       Malnutrition Assessment:  Malnutrition Status:  No malnutrition    Context:  Chronic illness     Findings of the 6 clinical characteristics of malnutrition:   Energy Intake:  No significant decrease in energy intake  Weight Loss:  No significant weight loss     Body Fat Loss:  No significant body fat loss,     Muscle Mass Loss:  No significant muscle mass loss,    Fluid Accumulation:  No significant fluid accumulation,        Estimated Daily Nutrient Needs:  Energy (kcal): 6370-9359YISTG (25-30kcals/kg consider bedbound 2/2 CVA and wounds); Weight Used for Energy Requirements: Current  Protein (g): 97-113g (1.2-1.4g/kg for wounds); Weight Used for Protein Requirements: Current  Fluid (ml/day): 9171-1390PZ; Method Used for Fluid Requirements: 1 ml/kcal      Nutrition Related Findings:  NFPE reveals no acute deficits. +Dysphagia with PEG TF as sole-source nutrition. GRV <10ml. No D/C, last BM 4/6. No edema-- improved.        Wounds:    Unstageable,Deep tissue injury,Moisture associate skin damage,Diabetic ulcer (Unstageable- Buttocks; MASD- L upper leg; DTI L heel; Amputated R Great toe; L great toe black)       Current Nutrition Therapies:  DIET NPO  ADULT TUBE FEEDING PEG; Diabetic; Delivery Method: Continuous; Continuous Initial Rate (mL/hr): 30; Continuous Advance Tube Feeding: Yes; Advancement Volume (mL/hr): 10; Advancement Frequency: Q 4 hours; Continuous Goal Rate (mL/hr): 60; Water Flu. .. Anthropometric Measures:  · Height:  5' 7.99\" (172.7 cm)  · Current Body Wt:  80.9 kg (178 lb 5.6 oz) (4/4)   · Admission Body Wt:  178 lb 5.6 oz (4/4)    · Usual Body Wt:   (elkin)     · Ideal Body Wt:  140 lbs:  127.4 %   · BMI Category: Overweight (BMI 25.0-29. 9)       Nutrition Diagnosis:   · Biting/chewing (masticatory) difficulty,Swallowing difficulty related to cognitive or neurological impairment (CVA) as evidenced by nutrition support-enteral nutrition      Nutrition Interventions:   Food and/or Nutrient Delivery: Continue NPO,Start tube feeding  Nutrition Education and Counseling: No recommendations at this time  Coordination of Nutrition Care: Continue to monitor while inpatient    Goals:  Meet >/=75% of EENs in >5 days, Wt maintenance within +/-0.5kg in >5 days, Signs of wound healing per nsg assessment in >5 days       Nutrition Monitoring and Evaluation:   Behavioral-Environmental Outcomes: None identified  Food/Nutrient Intake Outcomes: Enteral nutrition intake/tolerance  Physical Signs/Symptoms Outcomes: Weight,Biochemical data,Fluid status or edema,Skin    Discharge Planning:    Enteral nutrition     Electronically signed by Leonarda Severe on 4/8/2022 at 3:16 PM    Contact: Ext [phone], or via [employer]

## 2022-04-08 NOTE — PROGRESS NOTES
Problem: Diabetes Self-Management  Goal: *Disease process and treatment process  Description: Define diabetes and identify own type of diabetes; list 3 options for treating diabetes. Outcome: Progressing Towards Goal  Goal: *Incorporating nutritional management into lifestyle  Description: Describe effect of type, amount and timing of food on blood glucose; list 3 methods for planning meals. Outcome: Progressing Towards Goal  Goal: *Incorporating physical activity into lifestyle  Description: State effect of exercise on blood glucose levels. Outcome: Progressing Towards Goal  Goal: *Developing strategies to promote health/change behavior  Description: Define the ABC's of diabetes; identify appropriate screenings, schedule and personal plan for screenings. Outcome: Progressing Towards Goal  Goal: *Using medications safely  Description: State effect of diabetes medications on diabetes; name diabetes medication taking, action and side effects. Outcome: Progressing Towards Goal  Goal: *Monitoring blood glucose, interpreting and using results  Description: Identify recommended blood glucose targets  and personal targets. Outcome: Progressing Towards Goal  Goal: *Prevention, detection, treatment of acute complications  Description: List symptoms of hyper- and hypoglycemia; describe how to treat low blood sugar and actions for lowering  high blood glucose level. Outcome: Progressing Towards Goal  Goal: *Prevention, detection and treatment of chronic complications  Description: Define the natural course of diabetes and describe the relationship of blood glucose levels to long term complications of diabetes.   Outcome: Progressing Towards Goal  Goal: *Developing strategies to address psychosocial issues  Description: Describe feelings about living with diabetes; identify support needed and support network  Outcome: Progressing Towards Goal  Goal: *Insulin pump training  Outcome: Progressing Towards Goal  Goal: *Sick day guidelines  Outcome: Progressing Towards Goal  Goal: *Patient Specific Goal (EDIT GOAL, INSERT TEXT)  Outcome: Progressing Towards Goal     Problem: Patient Education: Go to Patient Education Activity  Goal: Patient/Family Education  Outcome: Progressing Towards Goal     Problem: Pressure Injury - Risk of  Goal: *Prevention of pressure injury  Description: Document Shakir Scale and appropriate interventions in the flowsheet. Outcome: Progressing Towards Goal  Note: Pressure Injury Interventions:  Sensory Interventions: Assess need for specialty bed    Moisture Interventions: Check for incontinence Q2 hours and as needed    Activity Interventions: Pressure redistribution bed/mattress(bed type)    Mobility Interventions: Turn and reposition approx. every two hours(pillow and wedges)    Nutrition Interventions: Document food/fluid/supplement intake    Friction and Shear Interventions: Apply protective barrier, creams and emollients                Problem: Patient Education: Go to Patient Education Activity  Goal: Patient/Family Education  Outcome: Progressing Towards Goal     Problem: Falls - Risk of  Goal: *Absence of Falls  Description: Document Maryann Fall Risk and appropriate interventions in the flowsheet. Outcome: Progressing Towards Goal  Note: Fall Risk Interventions:  Mobility Interventions: Communicate number of staff needed for ambulation/transfer    Mentation Interventions: Bed/chair exit alarm,Door open when patient unattended         Elimination Interventions: Call light in reach              Problem: Patient Education: Go to Patient Education Activity  Goal: Patient/Family Education  Outcome: Progressing Towards Goal     Problem: Impaired Skin Integrity/Pressure Injury Treatment  Goal: *Improvement of Existing Pressure Injury  Outcome: Progressing Towards Goal  Goal: *Prevention of pressure injury  Description: Document Shakir Scale and appropriate interventions in the flowsheet.   Outcome: Progressing Towards Goal  Note: Pressure Injury Interventions:  Sensory Interventions: Assess need for specialty bed    Moisture Interventions: Check for incontinence Q2 hours and as needed    Activity Interventions: Pressure redistribution bed/mattress(bed type)    Mobility Interventions: Turn and reposition approx.  every two hours(pillow and wedges)    Nutrition Interventions: Document food/fluid/supplement intake    Friction and Shear Interventions: Apply protective barrier, creams and emollients                Problem: Patient Education: Go to Patient Education Activity  Goal: Patient/Family Education  Outcome: Progressing Towards Goal

## 2022-04-08 NOTE — PROGRESS NOTES
Consult Date: 4/8/2022      Subjective   HISTORY OF PRESENTING ILLNESS   Patient has been moved to ICU. She was found to be indistress. Accompanied by family members. Is on tube feeds  Still with gangrene of toe    Past Medical History:   Diagnosis Date    Diabetes mellitus ([address]     HTN (hypertension)     Hyperlipidemia     Neuropathy     PAD (peripheral artery disease) (HCC)     PCI    Sciatica       Past Surgical History:   Procedure Laterality Date    HX AMPUTATION Right     great toe    HX CERVICAL FUSION      HX OTHER SURGICAL Bilateral     Stent placement    HX OTHER SURGICAL      HX VASCULAR STENT Bilateral     2 in right 1 in left    HX VASCULAR STENT Bilateral      Family History   Problem Relation Age of Onset    Cancer Mother     Diabetes Mother     Hypertension Mother       Social History     Tobacco Use    Smoking status: Never Smoker    Smokeless tobacco: Never Used   Substance Use Topics    Alcohol use: Not Currently       Current Facility-Administered Medications   Medication Dose Route Frequency Provider Last Rate Last Admin    hydrALAZINE (APRESOLINE) 20 mg/mL injection 10 mg  10 mg IntraVENous Q6H PRN Scarlett Sehrwood RN   10 mg at 04/08/22 1430    furosemide (LASIX) injection 40 mg  40 mg IntraVENous DAILY Israel Soto MD   40 mg at 04/08/22 1009    fluconazole (DIFLUCAN) 200mg/100 mL IVPB (premix)  200 mg IntraVENous Q24H Kamron Bee  mL/hr at 04/08/22 1232 200 mg at 04/08/22 1232    VANCOMYCIN INFORMATION NOTE   Other Rx Dosing/Monitoring Kamron Bee MD        vancomycin (VANCOCIN) 500 mg in 0.9% sodium chloride (MBP/ADV) 100 mL MBP  500 mg IntraVENous Q12H Kamron Bee MD        [START ON 4/9/2022] Vancomycin trough level - please draw prior to dose on 4/9 @ 1100. Thanks!    Other Alfredo Carter MD        insulin glargine (LANTUS) injection 22 Units  22 Units SubCUTAneous BID Marcelle Soto MD        methylPREDNISolone (PF) (SOLU-MEDROL) injection 40 mg  40 mg IntraVENous Q6H Israel Soto MD        And    insulin NPH (NOVOLIN N, HUMULIN N) injection 24 Units  24 Units SubCUTAneous Q6H Jonah Soto MD        labetaloL (NORMODYNE;TRANDATE) 20 mg/4 mL (5 mg/mL) injection 20 mg  20 mg IntraVENous Q4H PRN Jonah Soto MD        albuterol-ipratropium (DUO-NEB) 2.5 MG-0.5 MG/3 ML  3 mL Nebulization Q6HWA RT Martínez Crespo MD   3 mL at 04/08/22 1358    0.9% sodium chloride infusion 250 mL  250 mL IntraVENous PRN Becky Crespo MD        gabapentin (NEURONTIN) capsule 300 mg  300 mg Oral TID Abril Sherwood MD   300 mg at 04/07/22 2205    0.9% sodium chloride infusion 250 mL  250 mL IntraVENous PRN Jonah Soto MD       [facility] insulin lispro (HUMALOG) injection   SubCUTAneous Q6H Jonah Soto MD   18 Units at 04/08/22 1232    atorvastatin (LIPITOR) tablet 20 mg  20 mg Oral QHS Israel Soto MD   20 mg at 04/07/22 2205    latanoprost (XALATAN) 0.005 % ophthalmic solution 1 Drop  1 Drop Right Eye QPM Abril Sherwood MD   1 Drop at 04/07/22 1710    aspirin delayed-release tablet 81 mg  81 mg Oral DAILY Israel Soto MD   81 mg at 04/07/22 0802    brimonidine (ALPHAGAN) 0.2 % ophthalmic solution 1 Drop  1 Drop Both Eyes TID Abril Sherwood MD   1 Drop at 04/08/22 1013    glucose chewable tablet 16 g  4 Tablet Oral PRN Jonah Soto MD        glucagon (GLUCAGEN) injection 1 mg  1 mg IntraMUSCular PRN Jonah Soto MD        acetaminophen (TYLENOL) suppository 650 mg  650 mg Rectal Q6H PRN Jonah Soto MD        polyethylene glycol (MIRALAX) packet 17 g  17 g Oral DAILY PRN Jonah Soto MD        ondansetron (ZOFRAN ODT) tablet 4 mg  4 mg Oral Q8H PRN Israel Soto MD        Or    ondansetron [facility]) injection 4 mg  4 mg IntraVENous Q6H PRN Jonah Soto MD        heparin (porcine) injection 5,000 Units  5,000 Units SubCUTAneous Richard No MD   5,000 Units at 04/08/22 1430    piperacillin-tazobactam (ZOSYN) 3.375 g in 0.9% sodium chloride (MBP/ADV) 100 mL MBP  3.375 g IntraVENous Q8H Israel Soto MD 25 mL/hr at 04/08/22 1028 3.375 g at 04/08/22 1028    albuterol-ipratropium (DUO-NEB) 2.5 MG-0.5 MG/3 ML  3 mL Nebulization Q6H PRN Leticia Soto MD        ferrous sulfate 300 mg (60 mg iron)/5 mL oral syrup 300 mg  300 mg Oral BID Israel Soto MD   300 mg at 04/07/22 2205    acetaminophen (TYLENOL) solution 650 mg  650 mg Per NG tube Q6H PRN Leticia Soto MD   650 mg at 04/08/22 1009        Review of Systems   Unable to perform ROS: Patient nonverbal       Objective     Vital signs for last 24 hours:  Visit Vitals  BP (!) 167/72 (BP 1 Location: Right upper arm, BP Patient Position: At rest)   Pulse 90   Temp (!) 101.3 °F (38.5 °C)   Resp 24   Ht 5' 7.99\" (1.727 m)   Wt 80.9 kg (178 lb 5.6 oz)   SpO2 96%   BMI 27.12 kg/m²           Recent Results (from the past 24 hour(s))   GLUCOSE, POC    Collection Time: 04/07/22  7:27 PM   Result Value Ref Range    Glucose (POC) 287 (H) 65 - 117 mg/dL    Performed by [address] [place] [address], POC    Collection Time: 04/08/22 12:26 AM   Result Value Ref Range    Glucose (POC) 318 (H) 65 - 117 mg/dL    Performed by Solo Amato    CBC WITH AUTOMATED DIFF    Collection Time: 04/08/22  6:20 AM   Result Value Ref Range    WBC 17.6 (H) 3.6 - 11.0 K/uL    RBC 4.20 3.80 - 5.20 M/uL    HGB 11.7 11.5 - 16.0 g/dL    HCT 37.9 35.0 - 47.0 %    MCV 90.2 80.0 - 99.0 FL    MCH 27.9 26.0 - 34.0 PG    MCHC 30.9 30.0 - 36.5 g/dL    RDW 17.4 (H) 11.5 - 14.5 %    PLATELET 706 (H) 049 - 400 K/uL    MPV 11.2 8.9 - 12.9 FL    NRBC 0.2 (H) 0.0  WBC    ABSOLUTE NRBC 0.04 (H) 0.00 - 0.01 K/uL    NEUTROPHILS 78 (H) 32 - 75 %    LYMPHOCYTES 14 12 - 49 %    MONOCYTES 5 5 - 13 %    EOSINOPHILS 1 0 - 7 %    BASOPHILS 1 0 - 1 %    IMMATURE GRANULOCYTES 1 (H) 0 - 0.5 %    ABS. NEUTROPHILS 13.8 (H) 1.8 - 8.0 K/UL    ABS.  LYMPHOCYTES 2.5 0.8 - 3.5 K/UL    ABS. MONOCYTES 0.8 0.0 - 1.0 K/UL    ABS. EOSINOPHILS 0.1 0.0 - 0.4 K/UL    ABS. BASOPHILS 0.1 0.0 - 0.1 K/UL    ABS. IMM. GRANS. 0.2 (H) 0.00 - 0.04 K/UL    DF AUTOMATED     C REACTIVE PROTEIN, QT    Collection Time: 04/08/22  6:20 AM   Result Value Ref Range    C-Reactive protein 14.50 (H) 0.00 - 0.60 mg/dL   PROCALCITONIN    Collection Time: 04/08/22  6:20 AM   Result Value Ref Range    Procalcitonin 3.37 (H) 0 ng/mL   METABOLIC PANEL, COMPREHENSIVE    Collection Time: 04/08/22  6:20 AM   Result Value Ref Range    Sodium 151 (H) 136 - 145 mmol/L    Potassium 5.2 (H) 3.5 - 5.1 mmol/L    Chloride 119 (H) 97 - 108 mmol/L    CO2 25 21 - 32 mmol/L    Anion gap 7 5 - 15 mmol/L    Glucose 416 (H) 65 - 100 mg/dL    BUN 47 (H) 6 - 20 mg/dL    Creatinine 1.41 (H) 0.55 - 1.02 mg/dL    BUN/Creatinine ratio 33 (H) 12 - 20      GFR est AA 44 (L) >60 ml/min/1.73m2    GFR est non-AA 36 (L) >60 ml/min/1.73m2    Calcium 8.7 8.5 - 10.1 mg/dL    Bilirubin, total 0.4 0.2 - 1.0 mg/dL    AST (SGOT) 52 (H) 15 - 37 U/L    ALT (SGPT) 64 12 - 78 U/L    Alk.  phosphatase 156 (H) 45 - 117 U/L    Protein, total 7.7 6.4 - 8.2 g/dL    Albumin 1.8 (L) 3.5 - 5.0 g/dL    Globulin 5.9 (H) 2.0 - 4.0 g/dL    A-G Ratio 0.3 (L) 1.1 - 2.2     NT-PRO BNP    Collection Time: 04/08/22  6:20 AM   Result Value Ref Range    NT pro-BNP 28,302 (H) <450 pg/mL   GLUCOSE, POC    Collection Time: 04/08/22  6:34 AM   Result Value Ref Range    Glucose (POC) 361 (H) 65 - 117 mg/dL    Performed by Gasper Harding    GLUCOSE, POC    Collection Time: 04/08/22  6:39 AM   Result Value Ref Range    Glucose (POC) 368 (H) 65 - 117 mg/dL    Performed by Gasper Harding    BLOOD GAS, ARTERIAL    Collection Time: 04/08/22  7:15 AM   Result Value Ref Range    pH 7.51 (H) 7.35 - 7.45      PCO2 31 (L) 35 - 45 mmHg    PO2 65 (L) 75 - 100 mmHg    O2 SAT 95 (L) >95 %    BICARBONATE 26 22 - 26 mmol/L    BASE EXCESS 2.2 (H) 0 - 2 mmol/L    O2 METHOD Room air      FIO2 21.0 %    SITE Right Radial      EVELIN'S TEST PASS     GLUCOSE, POC    Collection Time: 04/08/22  9:38 AM   Result Value Ref Range    Glucose (POC) 407 (H) 65 - 117 mg/dL    Performed by Rios Meyer, POC    Collection Time: 04/08/22 12:09 PM   Result Value Ref Range    Glucose (POC) 405 (H) 65 - 117 mg/dL    Performed by Mallory Duncan    GLUCOSE, POC    Collection Time: 04/08/22  3:37 PM   Result Value Ref Range    Glucose (POC) 292 (H) 65 - 117 mg/dL    Performed by Mallory Duncan           Intake/Output Summary (Last 24 hours) at 4/8/2022 1703  Last data filed at 4/8/2022 0418  Gross per 24 hour   Intake --   Output 1825 ml   Net -1825 ml      Current Shift: No intake/output data recorded. Last 3 Shifts: 04/06 1901 - 04/08 0700  In: 1624   Out: 4525 [Urine:4525]  Physical Exam  Constitutional:       Appearance: She is ill-appearing. HENT:      Head: Normocephalic and atraumatic. Nose: Nose normal.   Cardiovascular:      Rate and Rhythm: Tachycardia present. Pulses: Normal pulses. Abdominal:      General: Abdomen is flat. Skin:     General: Skin is warm and dry.         Her face appears swollen  Gangrene of the big toe-no clear demarcation line  Data Review:   Recent Results (from the past 24 hour(s))   GLUCOSE, POC    Collection Time: 04/07/22  7:27 PM   Result Value Ref Range    Glucose (POC) 287 (H) 65 - 117 mg/dL    Performed by [address] [place] [address], POC    Collection Time: 04/08/22 12:26 AM   Result Value Ref Range    Glucose (POC) 318 (H) 65 - 117 mg/dL    Performed by Jonathon Burgess    CBC WITH AUTOMATED DIFF    Collection Time: 04/08/22  6:20 AM   Result Value Ref Range    WBC 17.6 (H) 3.6 - 11.0 K/uL    RBC 4.20 3.80 - 5.20 M/uL    HGB 11.7 11.5 - 16.0 g/dL    HCT 37.9 35.0 - 47.0 %    MCV 90.2 80.0 - 99.0 FL    MCH 27.9 26.0 - 34.0 PG    MCHC 30.9 30.0 - 36.5 g/dL    RDW 17.4 (H) 11.5 - 14.5 %    PLATELET 854 (H) 897 - 400 K/uL    MPV 11.2 8.9 - 12.9 FL    NRBC 0.2 (H) 0.0  WBC ABSOLUTE NRBC 0.04 (H) 0.00 - 0.01 K/uL    NEUTROPHILS 78 (H) 32 - 75 %    LYMPHOCYTES 14 12 - 49 %    MONOCYTES 5 5 - 13 %    EOSINOPHILS 1 0 - 7 %    BASOPHILS 1 0 - 1 %    IMMATURE GRANULOCYTES 1 (H) 0 - 0.5 %    ABS. NEUTROPHILS 13.8 (H) 1.8 - 8.0 K/UL    ABS. LYMPHOCYTES 2.5 0.8 - 3.5 K/UL    ABS. MONOCYTES 0.8 0.0 - 1.0 K/UL    ABS. EOSINOPHILS 0.1 0.0 - 0.4 K/UL    ABS. BASOPHILS 0.1 0.0 - 0.1 K/UL    ABS. IMM. GRANS. 0.2 (H) 0.00 - 0.04 K/UL    DF AUTOMATED     C REACTIVE PROTEIN, QT    Collection Time: 04/08/22  6:20 AM   Result Value Ref Range    C-Reactive protein 14.50 (H) 0.00 - 0.60 mg/dL   PROCALCITONIN    Collection Time: 04/08/22  6:20 AM   Result Value Ref Range    Procalcitonin 3.37 (H) 0 ng/mL   METABOLIC PANEL, COMPREHENSIVE    Collection Time: 04/08/22  6:20 AM   Result Value Ref Range    Sodium 151 (H) 136 - 145 mmol/L    Potassium 5.2 (H) 3.5 - 5.1 mmol/L    Chloride 119 (H) 97 - 108 mmol/L    CO2 25 21 - 32 mmol/L    Anion gap 7 5 - 15 mmol/L    Glucose 416 (H) 65 - 100 mg/dL    BUN 47 (H) 6 - 20 mg/dL    Creatinine 1.41 (H) 0.55 - 1.02 mg/dL    BUN/Creatinine ratio 33 (H) 12 - 20      GFR est AA 44 (L) >60 ml/min/1.73m2    GFR est non-AA 36 (L) >60 ml/min/1.73m2    Calcium 8.7 8.5 - 10.1 mg/dL    Bilirubin, total 0.4 0.2 - 1.0 mg/dL    AST (SGOT) 52 (H) 15 - 37 U/L    ALT (SGPT) 64 12 - 78 U/L    Alk.  phosphatase 156 (H) 45 - 117 U/L    Protein, total 7.7 6.4 - 8.2 g/dL    Albumin 1.8 (L) 3.5 - 5.0 g/dL    Globulin 5.9 (H) 2.0 - 4.0 g/dL    A-G Ratio 0.3 (L) 1.1 - 2.2     NT-PRO BNP    Collection Time: 04/08/22  6:20 AM   Result Value Ref Range    NT pro-BNP 28,302 (H) <450 pg/mL   GLUCOSE, POC    Collection Time: 04/08/22  6:34 AM   Result Value Ref Range    Glucose (POC) 361 (H) 65 - 117 mg/dL    Performed by Thomas Cordova    GLUCOSE, POC    Collection Time: 04/08/22  6:39 AM   Result Value Ref Range    Glucose (POC) 368 (H) 65 - 117 mg/dL    Performed by Thomas Cordova    BLOOD GAS, ARTERIAL Collection Time: 04/08/22  7:15 AM   Result Value Ref Range    pH 7.51 (H) 7.35 - 7.45      PCO2 31 (L) 35 - 45 mmHg    PO2 65 (L) 75 - 100 mmHg    O2 SAT 95 (L) >95 %    BICARBONATE 26 22 - 26 mmol/L    BASE EXCESS 2.2 (H) 0 - 2 mmol/L    O2 METHOD Room air      FIO2 21.0 %    SITE Right Radial      EVELIN'S TEST PASS     GLUCOSE, POC    Collection Time: 04/08/22  9:38 AM   Result Value Ref Range    Glucose (POC) 407 (H) 65 - 117 mg/dL    Performed by Zhang Valle, POC    Collection Time: 04/08/22 12:09 PM   Result Value Ref Range    Glucose (POC) 405 (H) 65 - 117 mg/dL    Performed by Zhang Valle, POC    Collection Time: 04/08/22  3:37 PM   Result Value Ref Range    Glucose (POC) 292 (H) 65 - 117 mg/dL    Performed by Ariela Pinon          XR CHEST PORT   Final Result      XR CHEST PORT   Final Result   No acute cardiopulmonary abnormality. .       XR CHEST PORT   Final Result      CT HEAD WO CONT   Final Result   Chronic changes which appear stable. No acute or active intracranial process. Chronic paranasal sinus disease         XR CHEST PORT   Final Result   No acute findings.       XR CHEST PORT    (Results Pending)        Patient Active Problem List   Diagnosis Code    HTN (hypertension) I10    Hyperlipidemia E78.5    Neuropathy G62.9    Sciatica M54.30    Diabetes mellitus with neurological manifestations, uncontrolled [id number]    Infection of nailbed of toe of left foot L03.032    PAD [facility]peripheral artery disease) ([facility]) I73.9    Hypercalcemia E83.52    DM type 2 causing vascular disease ([address] E11.59    Type 2 diabetes mellitus with nephropathy ([address] E11.21    Acute osteomyelitis of ankle or foot ([address] M86.179    Diabetic peripheral neuropathy ([address] E11.42    Spinal stenosis in cervical region M48.02    Ischemia of both lower extremities I99.8    Pain in both lower legs M79.661, M79.662    CVA (cerebral vascular accident) ([address] I63.9    Elevated troponin R77.8    Hyperkalemia E87.5    UTI (urinary tract infection) N39.0    Sepsis (HCC) A41.9    RICARDO (acute kidney injury) ([facility] N17.9    AMS (altered mental status) R41.82        DIAGNOSES:  1. Hypernatremia, much worse after correction of serum glucose  2. Fluid overload  3. UTI/sepsis on Zosyn and vancomycin  4. Gangrene of left great toe  5. Acute kidney injury on chronic kidney disease  6. Encephalopathy  7. Acute on chronic anemia  8. History of CVA  9. Diabetes mellitus with complications  10. Peripheral arterial disease  DISCUSSION:  Continue with free water flushes  Control hyperglycemia adjusting insulin  Prognosis is very poor      Thanks for consulting me. Please don't hesitate to contact me if any questions arise of if I can assist in any manner. This dictation was done by dragon, computer voice recognition software. Often unanticipated grammatical, syntax, phones and other interpretive errors are inadvertently transcribed. Please excuse errors that have escaped final proofreading. Please contact me if you suspect dictation or transcription errors.   Dr Lorraine Hall  [address] [place] [address], [address] [facility], [facility]  Cell Phone: [phone]  Office phone: [phone]  Fax: [phone]

## 2022-04-08 NOTE — PROGRESS NOTES
Paged Dr. Zara Payne regarding pt BP of 189/93 after receiving lasix and requesting BP medication. Awaiting response.

## 2022-04-08 NOTE — PROGRESS NOTES
Vancomycin Dosing Consult  Day #1 of vancomycin therapy  Consult ordered by Dr. Larry Gerber for this 78 y.o. female for indication of Sepsis, UTI, SSTI.   Antibiotic regimen: Vancomycin + fluconazole + zosyn    Temp (24hrs), Av.1 °F (37.8 °C), Min:98 °F (36.7 °C), Max:101.9 °F (38.8 °C)    Recent Labs     22  0620 22  0814 22  1128   WBC 17.6* 15.1* 14.7*   CREA 1.41*  --  1.47*   BUN 47*  --  55*     Est CrCl: 38 ml/min  Concomitant nephrotoxic drugs: None    Cultures:   4/3 blood: ngtd - prelim   urine: pending    MRSA Swab: Not detected - Final    Target range: AUC/GEMA 400-600         Assessment/Plan:   RICARDO on top of CKD  Renal function improving  CRP and Procal elevated but trending down  Loading dose: vancomycin 1500 mg IV x 1  Maintenance regimen: vancomycin 500 mg IV q12h  --projected steady state AUC ~483 with trough 17.5  Will check trough level tomorrow AM  Antimicrobial stop date TBD

## 2022-04-08 NOTE — PROGRESS NOTES
IMPRESSION:   1. Sepsis  2. ?Aspiration pneumonia  3. Urinary tract infection  4. Acute kidney injury  5. Hyperkalemia resolved  6. History of CVA with right-sided weakness  7. Anemia  8. Hypernatremia  9. Hyperkalemia  10. Gangrene of the left great toe sacral decubiti ulcer      RECOMMENDATIONS/PLAN:   1. She is on Zosyn   2. Patient was wheezing started on Solu-Medrol will give nebulizer treatment aspiration precaution  3. She is on Lasix now she is on room air arterial blood gases shows PO2 65 PCO2 31 we will start patient on oxygen per protocol  4. Chest x-ray no acute infiltrate  5. recieve packed RBC today's Hb 11.7  6. Patient was tachypneic tachycardic rapid response was called this morning arterial blood gases was done condition got worse transferred to ICU she is still a full code   Time spent patient care 30 minutes  [x] High complexity decision making was performed  [x] See my orders for details  HPI  82-year-old lady nursing home resident came in as she was found confused with altered mental status family noted that she was not acting properly she is in a nursing home unable to get any history to the patient she has significant past medical history of CVA right-sided weakness hypertension diabetes mellitus she is bedridden open eyes does not follow any command possibly septic so admitted, and critical care consult was called. PMH:  has a past medical history of Diabetes mellitus ([address] HTN (hypertension), Hyperlipidemia, Neuropathy, PAD (peripheral artery disease) ([address] and Sciatica. PSH:   has a past surgical history that includes hx other surgical (Bilateral); hx amputation (Right); hx other surgical; hx cervical fusion; hx vascular stent (Bilateral); and hx vascular stent (Bilateral). FHX: family history includes Cancer in her mother; Diabetes in her mother; Hypertension in her mother. SHX:  reports that she has never smoked.  She has never used smokeless tobacco. She reports previous alcohol use. She reports that she does not use drugs.     ALL: No Known Allergies     MEDS:   [x] Reviewed - As Below   [] Not reviewed    Current Facility-Administered Medications   Medication    hydrALAZINE (APRESOLINE) 20 mg/mL injection 10 mg    methylPREDNISolone (PF) (SOLU-MEDROL) injection 40 mg    furosemide (LASIX) injection 40 mg    labetaloL (NORMODYNE;TRANDATE) 20 mg/4 mL (5 mg/mL) injection 10 mg    insulin lispro (HUMALOG) injection 10 Units    albuterol-ipratropium (DUO-NEB) 2.5 MG-0.5 MG/3 ML    0.9% sodium chloride infusion 250 mL    gabapentin (NEURONTIN) capsule 300 mg    0.9% sodium chloride infusion 250 mL    insulin lispro (HUMALOG) injection    insulin glargine (LANTUS) injection 22 Units    atorvastatin (LIPITOR) tablet 20 mg    latanoprost (XALATAN) 0.005 % ophthalmic solution 1 Drop    aspirin delayed-release tablet 81 mg    brimonidine (ALPHAGAN) 0.2 % ophthalmic solution 1 Drop    glucose chewable tablet 16 g    glucagon (GLUCAGEN) injection 1 mg    acetaminophen (TYLENOL) suppository 650 mg    polyethylene glycol (MIRALAX) packet 17 g    ondansetron (ZOFRAN ODT) tablet 4 mg    Or    ondansetron (ZOFRAN) injection 4 mg    heparin (porcine) injection 5,000 Units    piperacillin-tazobactam (ZOSYN) 3.375 g in 0.9% sodium chloride (MBP/ADV) 100 mL MBP    albuterol-ipratropium (DUO-NEB) 2.5 MG-0.5 MG/3 ML    ferrous sulfate 300 mg (60 mg iron)/5 mL oral syrup 300 mg    acetaminophen (TYLENOL) solution 650 mg      MAR reviewed and pertinent medications noted or modified as needed   Current Facility-Administered Medications   Medication    hydrALAZINE (APRESOLINE) 20 mg/mL injection 10 mg    methylPREDNISolone (PF) (SOLU-MEDROL) injection 40 mg    furosemide (LASIX) injection 40 mg    labetaloL (NORMODYNE;TRANDATE) 20 mg/4 mL (5 mg/mL) injection 10 mg    insulin lispro (HUMALOG) injection 10 Units    albuterol-ipratropium (DUO-NEB) 2.5 MG-0.5 MG/3 ML    0.9% sodium chloride infusion 250 mL    gabapentin (NEURONTIN) capsule 300 mg    0.9% sodium chloride infusion 250 mL    insulin lispro (HUMALOG) injection    insulin glargine (LANTUS) injection 22 Units    atorvastatin (LIPITOR) tablet 20 mg    latanoprost (XALATAN) 0.005 % ophthalmic solution 1 Drop    aspirin delayed-release tablet 81 mg    brimonidine (ALPHAGAN) 0.2 % ophthalmic solution 1 Drop    glucose chewable tablet 16 g    glucagon (GLUCAGEN) injection 1 mg    acetaminophen (TYLENOL) suppository 650 mg    polyethylene glycol (MIRALAX) packet 17 g    ondansetron (ZOFRAN ODT) tablet 4 mg    Or    ondansetron (ZOFRAN) injection 4 mg    heparin (porcine) injection 5,000 Units    piperacillin-tazobactam (ZOSYN) 3.375 g in 0.9% sodium chloride (MBP/ADV) 100 mL MBP    albuterol-ipratropium (DUO-NEB) 2.5 MG-0.5 MG/3 ML    ferrous sulfate 300 mg (60 mg iron)/5 mL oral syrup 300 mg    acetaminophen (TYLENOL) solution 650 mg      PMH:  has a past medical history of Diabetes mellitus ([address] HTN (hypertension), Hyperlipidemia, Neuropathy, PAD (peripheral artery disease) ([address] and Sciatica. PSH:   has a past surgical history that includes hx other surgical (Bilateral); hx amputation (Right); hx other surgical; hx cervical fusion; hx vascular stent (Bilateral); and hx vascular stent (Bilateral). FHX: family history includes Cancer in her mother; Diabetes in her mother; Hypertension in her mother. SHX:  reports that she has never smoked. She has never used smokeless tobacco. She reports previous alcohol use. She reports that she does not use drugs. ROS:  Unable to obtain barely unresponsive    Hemodynamics:    CO:    CI:    CVP:    SVR:   PAP Systolic:    PAP Diastolic:    PVR:    UE53:        Ventilator Settings:      Mode Rate TV Press PEEP FiO2 PIP Min.  Vent                              Vital Signs: Telemetry:    normal sinus rhythm Intake/Output:   Visit Vitals  BP (!) 152/75   Pulse (!) 110   Temp (!) 101.9 °F (38.8 °C)   Resp (!) 32   Ht 5' 7.99\" (1.727 m)   Wt 80.9 kg (178 lb 5.6 oz)   SpO2 95%   BMI 27.12 kg/m²       Temp (24hrs), Av.1 °F (37.8 °C), Min:98 °F (36.7 °C), Max:101.9 °F (38.8 °C)        O2 Device: None (Room air) O2 Flow Rate (L/min): 0 l/min       Wt Readings from Last 4 Encounters:   22 80.9 kg (178 lb 5.6 oz)   22 82.2 kg (181 lb 3.5 oz)   20 84.4 kg (186 lb)   20 84.8 kg (187 lb)          Intake/Output Summary (Last 24 hours) at 2022 0959  Last data filed at 2022 0418  Gross per 24 hour   Intake --   Output 1825 ml   Net -1825 ml       Last shift:      No intake/output data recorded. Last 3 shifts:  1901 -  0700  In: 1624   Out: 4525 [Urine:4525]       Physical Exam:     General: Open eyes but does not follow any command  HEENT: NCAT, poor dentition, lips and mucosa dry  Eyes: anicteric; conjunctiva clear  Neck: no nodes, trach midline; no accessory MM use. Chest: no deformity,   Cardiac: R regular; no murmur;   Lungs: distant breath sounds; no wheezes  Abd: soft, NT, hypoactive BS  Ext: no edema; no joint swelling;  No clubbing  : NO giraldo, clear urine  Neuro: She only moves her left arm  Psych- unable to assess  Skin: warm, dry, no cyanosis;   Pulses: 1-2+ Bilateral pedal, radial  Capillary: brisk; pale      DATA:    MAR reviewed and pertinent medications noted or modified as needed  MEDS:   Current Facility-Administered Medications   Medication    hydrALAZINE (APRESOLINE) 20 mg/mL injection 10 mg    methylPREDNISolone (PF) (SOLU-MEDROL) injection 40 mg    furosemide (LASIX) injection 40 mg    labetaloL (NORMODYNE;TRANDATE) 20 mg/4 mL (5 mg/mL) injection 10 mg    insulin lispro (HUMALOG) injection 10 Units    albuterol-ipratropium (DUO-NEB) 2.5 MG-0.5 MG/3 ML    0.9% sodium chloride infusion 250 mL    gabapentin (NEURONTIN) capsule 300 mg    0.9% sodium chloride infusion 250 mL    insulin lispro (HUMALOG) injection  insulin glargine (LANTUS) injection 22 Units    atorvastatin (LIPITOR) tablet 20 mg    latanoprost (XALATAN) 0.005 % ophthalmic solution 1 Drop    aspirin delayed-release tablet 81 mg    brimonidine (ALPHAGAN) 0.2 % ophthalmic solution 1 Drop    glucose chewable tablet 16 g    glucagon (GLUCAGEN) injection 1 mg    acetaminophen (TYLENOL) suppository 650 mg    polyethylene glycol (MIRALAX) packet 17 g    ondansetron (ZOFRAN ODT) tablet 4 mg    Or    ondansetron (ZOFRAN) injection 4 mg    heparin (porcine) injection 5,000 Units    piperacillin-tazobactam (ZOSYN) 3.375 g in 0.9% sodium chloride (MBP/ADV) 100 mL MBP    albuterol-ipratropium (DUO-NEB) 2.5 MG-0.5 MG/3 ML    ferrous sulfate 300 mg (60 mg iron)/5 mL oral syrup 300 mg    acetaminophen (TYLENOL) solution 650 mg        Labs:    Recent Labs     04/08/22  0620 04/07/22  0814 04/06/22  1128   WBC 17.6* 15.1* 14.7*   HGB 11.7 10.6* 9.0*   * 372 335     Recent Labs     04/08/22  0620 04/06/22  1128   * 147*   K 5.2* 4.8   * 119*   CO2 25 23   * 353*   BUN 47* 55*   CREA 1.41* 1.47*   CA 8.7 8.4*   ALB 1.8* 1.7*   ALT 64 78     Recent Labs     04/08/22  0715   PH 7.51*   PCO2 31*   PO2 65*   HCO3 26   FIO2 21.0     No results for input(s): CPK, CKNDX, TROIQ in the last 72 hours. No lab exists for component: CPKMB  No results found for: BNPP, BNP   Lab Results   Component Value Date/Time    Culture result: No growth 3 days 04/03/2022 10:43 PM    Culture result: No growth 6 days 03/05/2022 10:16 AM    Culture result: Heavy Escherichia coli 02/28/2022 08:15 PM    Culture result: Moderate  Mixed skin yasmine isolated   02/28/2022 08:15 PM     Lab Results   Component Value Date/Time    TSH 2.880 12/12/2016 10:13 AM        Imaging:    Results from [facility] encounter on 04/03/22    XR CHEST PORT    Narrative  Chest single view. Comparison single view chest April 8, 2022 at 3:40.     Lungs clear; no interstitial or alveolar pulmonary edema. Cardiac and  mediastinal structures unchanged. No pneumothorax or pleural effusion. Cervical neck hardware. Results from [facility] encounter on 04/03/22    CT HEAD WO CONT    Narrative  PROCEDURE: CT HEAD WO CONT    HISTORY:Altered mental status    COMPARISON:Head CT 3 March 2022    Department policy stipulates all CT scans at this facility are performed using  dose reduction optimization techniques as appropriate to the performed exam,  including the following: Automated exposure control, adjustments of the mA  and/or KVP according to the patient size, and the use of iterative  reconstruction technique. TECHNIQUE: Without IV contrast    Bones/extracranial soft tissues:  Maxillary sinuses and right frontal sinus  remains nearly completely opacified. Extra-axial spaces:  No hemorrhage, mass or focal fluid collection. Ventricles/sulci:  There is mild dilatation of the ventricles. Sulci are  prominent. Brain parenchyma: An area of encephalomalacia in the left frontal lobe is  showing chronic evolution without evidence of hemorrhagic conversion. No other  focal lesions identified. No mass effect. There is good gray-white differentiation. There are  inhomogeneous areas of mildly decreased density in periventricular white matter. Impression  Chronic changes which appear stable. No acute or active intracranial process.   Chronic paranasal sinus disease

## 2022-04-08 NOTE — PROGRESS NOTES
PROGRESS NOTE    Patient: Jose Escobedo MRN: [id number]  SSN: [id number]    YOB: 1945  Age: 78 y.o. Sex: female      Admit Date: 4/3/2022    LOS: 4 days       Subjective     Chief Complaint   Patient presents with    Altered mental status       HPI: Patient is a 78y.o. 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 04/05   Patient is seen at bedside with daughter and nephew today. Patient is in distress due to pain and daughter notes that patient gets Neurontin TID for neuropathy daily. Otherwise, patient is still receiving enteric feeding through PEG tube and getting IVF. Patient was seen by nephrology yesterday. Recommends obtaining renal panel and continuing IVF. Patient was seen by ID yesterday. Recommends continuing IV vancomycin for urosepsis. Patient was seen by pulmonology today. Recommends PRBC transfusion. Labs indicate WBC 16.9, Hgb 6.3, Na 148, Cl 120, BUN 72, Cr 1.71, Ca 8, CRP 28.4, pro-carrie 6.93 and .       04/06   Patient is seen at bedside. Patient received 2 units of PRBC. Patient is on Zosyn. No new changes today. Patient seen by the vascular surgeon he recommend great toe amputation  And for sacral wound recommend Medihoney ointment     Labs show increasing Hgb of 9, decreasing WBC 14.7, Na 147, , BUN 55,   Cr 1.47, and CRP 21.4. CXR is unremarkable. 04/07  Patient is seen resting at bedside.  Patient received one unit of PRBC with increased Hgb of 10.6. No acute changes today. Patient is seen by nephrology who recommends free water flushes to correct hypernatremia. Labs show increasing Hgb of 10.6, WBC 15.1, pro-calcitonin 3.78, and CRP 16.1.    4/8    Patient had a rapid response this morning ABG and chest x-ray was ordered  Patient tachycardic tachypneic  ABG  pH 7.51 PCO2 31 PO2 65 bicarb 26 oxygen saturation 95% on room air    Chest x-ray shows no much changes  BNP is elevated      ROS  Unable to obtain. Objective     Visit Vitals  BP (!) 152/75   Pulse (!) 110   Temp (!) 100.9 °F (38.3 °C)   Resp (!) 32   Ht 5' 7.99\" (1.727 m)   Wt 80.9 kg (178 lb 5.6 oz)   SpO2 95%   BMI 27.12 kg/m²    O2 Flow Rate (L/min): 0 l/min O2 Device: None (Room air)    Physical Exam:   Physical Exam  Constitutional: Open eyes do not follow any commands. Tracks with eyes. HENT:   Head: Normocephalic and atraumatic. Eyes: Pupils are equal, round, and reactive to light. EOM are normal.   Cardiovascular: Normal rate, regular rhythm and normal heart sounds. Pulmonary/Chest: Decreased breath sound   abdominal: Soft. Bowel sounds are normal. There is no abdominal tenderness. There is no rebound and no guarding. Musculoskeletal: Normal range of motion. Neurological: Open eyes do not follow any, right-sided weakness  Intake & Output:  Current Shift: No intake/output data recorded. Last three shifts: 04/06 1901 - 04/08 0700  In: 1624   Out: 4525 [Urine:4525]    Lab/Data Review: All lab results for the last 24 hours reviewed.        24 Hour Results:    Recent Results (from the past 24 hour(s))   GLUCOSE, POC    Collection Time: 04/07/22 11:59 AM   Result Value Ref Range    Glucose (POC) 312 (H) 65 - 117 mg/dL    Performed by Javi Amezquita, POC    Collection Time: 04/07/22  3:22 PM   Result Value Ref Range    Glucose (POC) 309 (H) 65 - 117 mg/dL    Performed by Javi Amezquita, POC    Collection Time: 04/07/22  7:27 PM   Result Value Ref Range    Glucose (POC) 287 (H) 65 - 117 mg/dL    Performed by [address] [place] [address], POC    Collection Time: 04/08/22 12:26 AM   Result Value Ref Range    Glucose (POC) 318 (H) 65 - 117 mg/dL    Performed by Simon Rahman    CBC WITH AUTOMATED DIFF    Collection Time: 04/08/22  6:20 AM   Result Value Ref Range    WBC 17.6 (H) 3.6 - 11.0 K/uL    RBC 4.20 3.80 - 5.20 M/uL    HGB 11.7 11.5 - 16.0 g/dL    HCT 37.9 35.0 - 47.0 %    MCV 90.2 80.0 - 99.0 FL    MCH 27.9 26.0 - 34.0 PG    MCHC 30.9 30.0 - 36.5 g/dL    RDW 17.4 (H) 11.5 - 14.5 %    PLATELET 116 (H) 985 - 400 K/uL    MPV 11.2 8.9 - 12.9 FL    NRBC 0.2 (H) 0.0  WBC    ABSOLUTE NRBC 0.04 (H) 0.00 - 0.01 K/uL    NEUTROPHILS 78 (H) 32 - 75 %    LYMPHOCYTES 14 12 - 49 %    MONOCYTES 5 5 - 13 %    EOSINOPHILS 1 0 - 7 %    BASOPHILS 1 0 - 1 %    IMMATURE GRANULOCYTES 1 (H) 0 - 0.5 %    ABS. NEUTROPHILS 13.8 (H) 1.8 - 8.0 K/UL    ABS. LYMPHOCYTES 2.5 0.8 - 3.5 K/UL    ABS. MONOCYTES 0.8 0.0 - 1.0 K/UL    ABS. EOSINOPHILS 0.1 0.0 - 0.4 K/UL    ABS. BASOPHILS 0.1 0.0 - 0.1 K/UL    ABS. IMM.  GRANS. 0.2 (H) 0.00 - 0.04 K/UL    DF AUTOMATED     C REACTIVE PROTEIN, QT    Collection Time: 04/08/22  6:20 AM   Result Value Ref Range    C-Reactive protein 14.50 (H) 0.00 - 0.60 mg/dL   PROCALCITONIN    Collection Time: 04/08/22  6:20 AM   Result Value Ref Range    Procalcitonin 3.37 (H) 0 ng/mL   METABOLIC PANEL, COMPREHENSIVE    Collection Time: 04/08/22  6:20 AM   Result Value Ref Range    Sodium 151 (H) 136 - 145 mmol/L    Potassium 5.2 (H) 3.5 - 5.1 mmol/L    Chloride 119 (H) 97 - 108 mmol/L    CO2 25 21 - 32 mmol/L    Anion gap 7 5 - 15 mmol/L    Glucose 416 (H) 65 - 100 mg/dL    BUN 47 (H) 6 - 20 mg/dL    Creatinine 1.41 (H) 0.55 - 1.02 mg/dL    BUN/Creatinine ratio 33 (H) 12 - 20      GFR est AA 44 (L) >60 ml/min/1.73m2    GFR est non-AA 36 (L) >60 ml/min/1.73m2    Calcium 8.7 8.5 - 10.1 mg/dL    Bilirubin, total 0.4 0.2 - 1.0 mg/dL    AST (SGOT) 52 (H) 15 - 37 U/L    ALT (SGPT) 64 12 - 78 U/L    Alk. phosphatase 156 (H) 45 - 117 U/L    Protein, total 7.7 6.4 - 8.2 g/dL    Albumin 1.8 (L) 3.5 - 5.0 g/dL    Globulin 5.9 (H) 2.0 - 4.0 g/dL    A-G Ratio 0.3 (L) 1.1 - 2.2     NT-PRO BNP    Collection Time: 04/08/22  6:20 AM   Result Value Ref Range    NT pro-BNP 28,302 (H) <450 pg/mL   GLUCOSE, POC    Collection Time: 04/08/22  6:34 AM   Result Value Ref Range    Glucose (POC) 361 (H) 65 - 117 mg/dL    Performed by Patrina Sandhoff    GLUCOSE, POC    Collection Time: 04/08/22  6:39 AM   Result Value Ref Range    Glucose (POC) 368 (H) 65 - 117 mg/dL    Performed by Patrina Sandhoff    BLOOD GAS, ARTERIAL    Collection Time: 04/08/22  7:15 AM   Result Value Ref Range    pH 7.51 (H) 7.35 - 7.45      PCO2 31 (L) 35 - 45 mmHg    PO2 65 (L) 75 - 100 mmHg    O2 SAT 95 (L) >95 %    BICARBONATE 26 22 - 26 mmol/L    BASE EXCESS 2.2 (H) 0 - 2 mmol/L    O2 METHOD Room air      FIO2 21.0 %    SITE Right Radial      EVELIN'S TEST PASS           Imaging:    XR CHEST PORT   Final Result      XR CHEST PORT   Final Result   No acute cardiopulmonary abnormality. .       XR CHEST PORT   Final Result      CT HEAD WO CONT   Final Result   Chronic changes which appear stable. No acute or active intracranial process. Chronic paranasal sinus disease         XR CHEST PORT   Final Result   No acute findings.              Assessment   Severe sepsis with UTI  UTI  Acute on chronic kidney disease  Hyperkalemia  Hypernatremia  CVA with right-sided weakness  Anemia of chronic disease  Gangrene of the left great toe  Sacral decubitus ulcer  Acute respiratory failure  Possible aspiration  Elevated BNP  Plan   Transfer the patient to ICU  Lasix 40 mg IV daily      IV Zosyn   sliding scale insulin  Aspirin 81 mg daily  Lipitor 20 mg daily  Alphagan 0.2% 3 times a day  Ferrous sulfate 325 mg twice a day  Heparin 5000 units subcu every 8 hours  Half-normal saline at 100 cc/h  Duo-neb 2.5 mg-0.5 mg PRN  Daily renal panel per nephrology recommendation. Full code. Start on half normal saline IVF.   Pulmonary nephrology infectious disease and vascular surgical.  Follow-up with the vascular surgeon regarding amputation of the left great toe    Discussed with the patient daughter at the bedside    Update patient condition with the patient daughter and transferring the patient to ICU    Current Facility-Administered Medications:     hydrALAZINE (APRESOLINE) 20 mg/mL injection 10 mg, 10 mg, IntraVENous, Q6H PRN, Trang Harmon RN, 10 mg at 04/08/22 0650    methylPREDNISolone (PF) (SOLU-MEDROL) injection 40 mg, 40 mg, IntraVENous, Q6H, Tiago Mills MD, 40 mg at 04/08/22 0815    furosemide (LASIX) injection 40 mg, 40 mg, IntraVENous, DAILY, Israel Soto MD    albuterol-ipratropium (DUO-NEB) 2.5 MG-0.5 MG/3 ML, 3 mL, Nebulization, Q6HWA RT, Martínez Crespo MD, 3 mL at 04/07/22 2053    0.9% sodium chloride infusion 250 mL, 250 mL, IntraVENous, PRN, Martínez Crespo MD    gabapentin (NEURONTIN) capsule 300 mg, 300 mg, Oral, TID, Niyah Chaudhari MD, 300 mg at 04/07/22 2205    0.9% sodium chloride infusion 250 mL, 250 mL, IntraVENous, PRN, Israel Soto MD    insulin lispro (HUMALOG) injection, , SubCUTAneous, Q6H, Israel Soto MD, 10 Units at 04/08/22 0600    insulin glargine (LANTUS) injection 22 Units, 22 Units, SubCUTAneous, DAILY, Israel Soto MD, 22 Units at 04/07/22 0801    atorvastatin (LIPITOR) tablet 20 mg, 20 mg, Oral, QHS, Israel Soto MD, 20 mg at 04/07/22 2205    latanoprost (XALATAN) 0.005 % ophthalmic solution 1 Drop, 1 Drop, Right Eye, QPM, Israel Soto MD, 1 Drop at 04/07/22 1710    aspirin delayed-release tablet 81 mg, 81 mg, Oral, DAILY, Israel Soto MD, 81 mg at 04/07/22 0802    brimonidine (ALPHAGAN) 0.2 % ophthalmic solution 1 Drop, 1 Drop, Both Eyes, TID, Israel Soto MD, 1 Drop at 04/07/22 2224    glucose chewable tablet 16 g, 4 Tablet, Oral, PRN, Jonah Soto MD    glucagon [facility]) injection 1 mg, 1 mg, IntraMUSCular, PRN, Abril Sherwood MD  Patricia Draft  [DISCONTINUED] acetaminophen (TYLENOL) tablet 650 mg, 650 mg, Oral, Q6H PRN, 650 mg at 04/04/22 2246 **OR** acetaminophen (TYLENOL) suppository 650 mg, 650 mg, Rectal, Q6H PRN, Jonah Soto MD    polyethylene glycol (MIRALAX) packet 17 g, 17 g, Oral, DAILY PRN, Jonah Soto MD    ondansetron (ZOFRAN ODT) tablet 4 mg, 4 mg, Oral, Q8H PRN **OR** ondansetron (ZOFRAN) injection 4 mg, 4 mg, IntraVENous, Q6H PRN, Israel Soto MD    heparin (porcine) injection 5,000 Units, 5,000 Units, SubCUTAneous, Q8H, Israel Soto MD, 5,000 Units at 04/08/22 0650    piperacillin-tazobactam (ZOSYN) 3.375 g in 0.9% sodium chloride (MBP/ADV) 100 mL MBP, 3.375 g, IntraVENous, Q8H, Israel Soto MD, Last Rate: 25 mL/hr at 04/07/22 2239, 3.375 g at 04/07/22 2239    albuterol-ipratropium (DUO-NEB) 2.5 MG-0.5 MG/3 ML, 3 mL, Nebulization, Q6H PRN, Jonah Soto MD    ferrous sulfate 300 mg (60 mg iron)/5 mL oral syrup 300 mg, 300 mg, Oral, BID, Israel Soto MD, 300 mg at 04/07/22 2205    acetaminophen (TYLENOL) solution 650 mg, 650 mg, Per NG tube, Q6H PRN, Israel Soto MD, 650 mg at 04/07/22 0544    Current Outpatient Medications   Medication Instructions    acidophilus-pectin, citrus 25 million cell -100 mg tab tablet 2 Tablets, Oral, DAILY    amLODIPine (NORVASC) 5 mg, Oral, DAILY    artificial saliva (MOUTH KOTE) spra 1 Spray, Oral, 3 TIMES DAILY    aspirin delayed-release 81 mg tablet Oral, DAILY    atorvastatin (LIPITOR) 20 mg tablet TAKE 1 TABLET BY MOUTH EVERY NIGHT    brimonidine (ALPHAGAN) 0.2 % ophthalmic solution 1 Drop, Both Eyes, 3 TIMES DAILY    ferrous sulfate 325 mg, Oral, 2 TIMES DAILY    gabapentin (NEURONTIN) 300 mg, Oral, 2 TIMES DAILY    insulin glargine (LANTUS) 50 Units, SubCUTAneous, DAILY    latanoprost (XALATAN) 0.005 % ophthalmic solution 1 Drop, Right Eye, EVERY EVENING    lidocaine (XYLOCAINE) 4 % (40 mg/mL) topical solution 1 mL, Topical, AS NEEDED    metoprolol succinate (TOPROL-XL) 50 mg, Oral, 2 TIMES DAILY         Signed By: Whitney Oconnell MD     April 8, 2022

## 2022-04-08 NOTE — PROGRESS NOTES
Paged Dr. Catarino Rosales regarding BG of 368 and paged according to [facility] instructions. Doctor ordered 10 units of sliding scale insulin to be given.

## 2022-04-08 NOTE — PROGRESS NOTES
Recent clinicals sent to accepting SNF ([address] [place] [address]) via [facility]. Will require PT/OT notes for insurance, and insurance will require auth. Byron Mckeon has not been started.         DOV Franklin

## 2022-04-08 NOTE — PROGRESS NOTES
During bedside shift report I noticed patient was breathing abnormally. Previous nurse did report to me that she did have some problems breathing during the night. I counted patient respirations and it was 32. Notified doctor. He then told me to put an order for ABG, chest xray, and to call respiratory. Once respiratory was unable to obtain ABG I decided a Rapid Response should be called because patient was still tachypneic. Vitals during the Rapid Response was BP: 152/75; pulse: 102; resp: 32; temp 100.9. Gregorio Rutledge Patient was on cardiac monitoring until transferred off unit.

## 2022-04-09 NOTE — PROGRESS NOTES
Consult Date: 4/9/2022      Subjective   Pt remains very lethargic   Sodium continue to go up   Still with gangrene of toe    Past Medical History:   Diagnosis Date    Diabetes mellitus ([address]     HTN (hypertension)     Hyperlipidemia     Neuropathy     PAD (peripheral artery disease) (HCC)     PCI    Sciatica       Past Surgical History:   Procedure Laterality Date    HX AMPUTATION Right     great toe    HX CERVICAL FUSION      HX OTHER SURGICAL Bilateral     Stent placement    HX OTHER SURGICAL      HX VASCULAR STENT Bilateral     2 in right 1 in left    HX VASCULAR STENT Bilateral      Family History   Problem Relation Age of Onset    Cancer Mother     Diabetes Mother     Hypertension Mother       Social History     Tobacco Use    Smoking status: Never Smoker    Smokeless tobacco: Never Used   Substance Use Topics    Alcohol use: Not Currently       Current Facility-Administered Medications   Medication Dose Route Frequency Provider Last Rate Last Admin    hydrALAZINE (APRESOLINE) tablet 50 mg  50 mg Oral TID Israel Soto MD        furosemide (LASIX) injection 40 mg  40 mg IntraVENous DAILY Israel Soto MD   40 mg at 04/09/22 1054    fluconazole (DIFLUCAN) 200mg/100 mL IVPB (premix)  200 mg IntraVENous Q24H Elvie Dougherty  mL/hr at 04/09/22 1053 200 mg at 04/09/22 1053    VANCOMYCIN INFORMATION NOTE   Other Rx Dosing/Monitoring Elvie Dougherty MD        vancomycin (VANCOCIN) 500 mg in 0.9% sodium chloride (MBP/ADV) 100 mL MBP  500 mg IntraVENous Q12H Elvie Dougherty  mL/hr at 04/09/22 1052 500 mg at 04/09/22 1052    insulin glargine (LANTUS) injection 22 Units  22 Units SubCUTAneous BID Israel Soto MD   22 Units at 04/09/22 1054    methylPREDNISolone (PF) (SOLU-MEDROL) injection 40 mg  40 mg IntraVENous Q6H Israel Soto MD   40 mg at 04/09/22 1101    And    insulin NPH (NOVOLIN N, HUMULIN N) injection 24 Units  24 Units SubCUTAneous Q6H Brittany Mark Ca MD   24 Units at 04/09/22 1101    labetaloL (NORMODYNE;TRANDATE) 20 mg/4 mL (5 mg/mL) injection 20 mg  20 mg IntraVENous Q4H PRN Israel Soto MD   20 mg at 04/09/22 0609    hydrALAZINE (APRESOLINE) 20 mg/mL injection 20 mg  20 mg IntraVENous Q6H PRN Israel Soto MD   20 mg at 04/09/22 1054    albuterol-ipratropium (DUO-NEB) 2.5 MG-0.5 MG/3 ML  3 mL Nebulization Q6HWA RT Ana Crespo MD   3 mL at 04/09/22 0750    0.9% sodium chloride infusion 250 mL  250 mL IntraVENous PRN Ana Crespo MD       [facility] [Held by provider] gabapentin (NEURONTIN) capsule 300 mg  300 mg Oral TID Lon Obrien MD   300 mg at 04/09/22 1054    0.9% sodium chloride infusion 250 mL  250 mL IntraVENous PRN Mark Soto MD       [facility] insulin lispro (HUMALOG) injection   SubCUTAneous Q6H Israel Soto MD   4 Units at 04/09/22 1226    atorvastatin (LIPITOR) tablet 20 mg  20 mg Oral QHS Israel Soto MD   20 mg at 04/08/22 2134    latanoprost (XALATAN) 0.005 % ophthalmic solution 1 Drop  1 Drop Right Eye QPM Lon Obrien MD   1 Drop at 04/08/22 1716    aspirin delayed-release tablet 81 mg  81 mg Oral DAILY Israel Soto MD   81 mg at 04/09/22 1055    brimonidine (ALPHAGAN) 0.2 % ophthalmic solution 1 Drop  1 Drop Both Eyes TID Lon Obrien MD   1 Drop at 04/09/22 1058    glucose chewable tablet 16 g  4 Tablet Oral PRN Mark Soto MD        glucagon (GLUCAGEN) injection 1 mg  1 mg IntraMUSCular PRN Mark Soto MD        acetaminophen (TYLENOL) suppository 650 mg  650 mg Rectal Q6H PRN Mark Soto MD        polyethylene glycol (MIRALAX) packet 17 g  17 g Oral DAILY PRN Mark Soto MD        ondansetron (ZOFRAN ODT) tablet 4 mg  4 mg Oral Q8H PRN Israel Soto MD        Or    ondansetron [facility]) injection 4 mg  4 mg IntraVENous Q6H PRN Mark Soto MD        heparin (porcine) injection 5,000 Units  5,000 Units SubCUTAneous Eir Faith MD 5,000 Units at 04/09/22 0534    piperacillin-tazobactam (ZOSYN) 3.375 g in 0.9% sodium chloride (MBP/ADV) 100 mL MBP  3.375 g IntraVENous Q8H Israel Soto MD 25 mL/hr at 04/09/22 1052 3.375 g at 04/09/22 1052    albuterol-ipratropium (DUO-NEB) 2.5 MG-0.5 MG/3 ML  3 mL Nebulization Q6H PRN Ludy Soto MD        ferrous sulfate 300 mg (60 mg iron)/5 mL oral syrup 300 mg  300 mg Oral BID Israel Soto MD   300 mg at 04/09/22 1054    acetaminophen (TYLENOL) solution 650 mg  650 mg Per NG tube Q6H PRN Israel Soto MD   650 mg at 04/08/22 1009        Review of Systems   Unable to perform ROS: Patient nonverbal       Objective     Vital signs for last 24 hours:  Visit Vitals  BP (!) 157/64 (BP 1 Location: Right upper arm, BP Patient Position: At rest)   Pulse 82   Temp 98.2 °F (36.8 °C)   Resp 17   Ht 5' 7.99\" (1.727 m)   Wt 86.4 kg (190 lb 7.6 oz)   SpO2 97%   BMI 28.97 kg/m²           Recent Results (from the past 24 hour(s))   GLUCOSE, POC    Collection Time: 04/08/22  3:37 PM   Result Value Ref Range    Glucose (POC) 292 (H) 65 - 117 mg/dL    Performed by Steven Palma, POC    Collection Time: 04/08/22  5:04 PM   Result Value Ref Range    Glucose (POC) 254 (H) 65 - 117 mg/dL    Performed by Roldan Camacho    GLUCOSE, POC    Collection Time: 04/08/22  8:31 PM   Result Value Ref Range    Glucose (POC) 254 (H) 65 - 117 mg/dL    Performed by Cherry Delgado    GLUCOSE, POC    Collection Time: 04/08/22 11:52 PM   Result Value Ref Range    Glucose (POC) 194 (H) 65 - 117 mg/dL    Performed by [address] [place] [address], COMPREHENSIVE    Collection Time: 04/09/22  3:32 AM   Result Value Ref Range    Sodium 155 (H) 136 - 145 mmol/L    Potassium 4.3 3.5 - 5.1 mmol/L    Chloride 124 (H) 97 - 108 mmol/L    CO2 25 21 - 32 mmol/L    Anion gap 6 5 - 15 mmol/L    Glucose 227 (H) 65 - 100 mg/dL    BUN 59 (H) 6 - 20 mg/dL    Creatinine 1.53 (H) 0.55 - 1.02 mg/dL    BUN/Creatinine ratio 39 (H) 12 - 20      GFR est AA 40 (L) >60 ml/min/1.73m2    GFR est non-AA 33 (L) >60 ml/min/1.73m2    Calcium 9.0 8.5 - 10.1 mg/dL    Bilirubin, total 0.4 0.2 - 1.0 mg/dL    AST (SGOT) 40 (H) 15 - 37 U/L    ALT (SGPT) 59 12 - 78 U/L    Alk. phosphatase 139 (H) 45 - 117 U/L    Protein, total 7.8 6.4 - 8.2 g/dL    Albumin 1.9 (L) 3.5 - 5.0 g/dL    Globulin 5.9 (H) 2.0 - 4.0 g/dL    A-G Ratio 0.3 (L) 1.1 - 2.2     CBC WITH AUTOMATED DIFF    Collection Time: 04/09/22  3:32 AM   Result Value Ref Range    WBC 24.0 (H) 3.6 - 11.0 K/uL    RBC 4.30 3.80 - 5.20 M/uL    HGB 12.1 11.5 - 16.0 g/dL    HCT 39.2 35.0 - 47.0 %    MCV 91.2 80.0 - 99.0 FL    MCH 28.1 26.0 - 34.0 PG    MCHC 30.9 30.0 - 36.5 g/dL    RDW 17.0 (H) 11.5 - 14.5 %    PLATELET 959 238 - 743 K/uL    MPV 10.8 8.9 - 12.9 FL    NRBC 0.1 (H) 0.0  WBC    ABSOLUTE NRBC 0.02 (H) 0.00 - 0.01 K/uL    NEUTROPHILS 88 (H) 32 - 75 %    LYMPHOCYTES 9 (L) 12 - 49 %    MONOCYTES 2 (L) 5 - 13 %    EOSINOPHILS 0 0 - 7 %    BASOPHILS 0 0 - 1 %    IMMATURE GRANULOCYTES 1 (H) 0 - 0.5 %    ABS. NEUTROPHILS 21.3 (H) 1.8 - 8.0 K/UL    ABS. LYMPHOCYTES 2.0 0.8 - 3.5 K/UL    ABS. MONOCYTES 0.4 0.0 - 1.0 K/UL    ABS. EOSINOPHILS 0.0 0.0 - 0.4 K/UL    ABS. BASOPHILS 0.0 0.0 - 0.1 K/UL    ABS. IMM.  GRANS. 0.2 (H) 0.00 - 0.04 K/UL    DF AUTOMATED     C REACTIVE PROTEIN, QT    Collection Time: 04/09/22  3:32 AM   Result Value Ref Range    C-Reactive protein 11.50 (H) 0.00 - 0.60 mg/dL   PROCALCITONIN    Collection Time: 04/09/22  3:32 AM   Result Value Ref Range    Procalcitonin 3.23 (H) 0 ng/mL   BLOOD GAS, ARTERIAL    Collection Time: 04/09/22  3:37 AM   Result Value Ref Range    pH 7.45 7.35 - 7.45      PCO2 35 35 - 45 mmHg    PO2 81 75 - 100 mmHg    O2 SAT 97 >95 %    BICARBONATE 25 22 - 26 mmol/L    BASE EXCESS 0.7 0 - 2 mmol/L    O2 METHOD RA      FIO2 21 %    Sample source Arterial      SITE Right Radial      EVELIN'S TEST Positive     GLUCOSE, POC    Collection Time: 04/09/22  5:25 AM Result Value Ref Range    Glucose (POC) 209 (H) 65 - 117 mg/dL    Performed by Eliot Jose, TROUGH    Collection Time: 04/09/22 10:30 AM   Result Value Ref Range    Vancomycin,trough 21.5 (HH) 5.0 - 10.0 ug/mL   GLUCOSE, POC    Collection Time: 04/09/22 11:33 AM   Result Value Ref Range    Glucose (POC) 216 (H) 65 - 117 mg/dL    Performed by Ricky Koehler           Intake/Output Summary (Last 24 hours) at 4/9/2022 1506  Last data filed at 4/9/2022 0533  Gross per 24 hour   Intake --   Output 2175 ml   Net -2175 ml      Current Shift: No intake/output data recorded. Last 3 Shifts: 04/07 1901 - 04/09 0700  In: -   Out: 4000 [Urine:3700; Drains:300]  Physical Exam  Constitutional:       Appearance: She is ill-appearing. HENT:      Head: Normocephalic and atraumatic. Nose: Nose normal.   Cardiovascular:      Rate and Rhythm: Tachycardia present. Pulses: Normal pulses. Abdominal:      General: Abdomen is flat. Skin:     General: Skin is warm and dry.         Her face appears swollen  Gangrene of the big toe-no clear demarcation line  Data Review:   Recent Results (from the past 24 hour(s))   GLUCOSE, POC    Collection Time: 04/08/22  3:37 PM   Result Value Ref Range    Glucose (POC) 292 (H) 65 - 117 mg/dL    Performed by Charlane Duverney, POC    Collection Time: 04/08/22  5:04 PM   Result Value Ref Range    Glucose (POC) 254 (H) 65 - 117 mg/dL    Performed by Vinh Xavier    GLUCOSE, POC    Collection Time: 04/08/22  8:31 PM   Result Value Ref Range    Glucose (POC) 254 (H) 65 - 117 mg/dL    Performed by [address] [place] [address], POC    Collection Time: 04/08/22 11:52 PM   Result Value Ref Range    Glucose (POC) 194 (H) 65 - 117 mg/dL    Performed by [address] [place] [address], COMPREHENSIVE    Collection Time: 04/09/22  3:32 AM   Result Value Ref Range    Sodium 155 (H) 136 - 145 mmol/L    Potassium 4.3 3.5 - 5.1 mmol/L    Chloride 124 (H) 97 - 108 mmol/L    CO2 25 21 - 32 mmol/L    Anion gap 6 5 - 15 mmol/L    Glucose 227 (H) 65 - 100 mg/dL    BUN 59 (H) 6 - 20 mg/dL    Creatinine 1.53 (H) 0.55 - 1.02 mg/dL    BUN/Creatinine ratio 39 (H) 12 - 20      GFR est AA 40 (L) >60 ml/min/1.73m2    GFR est non-AA 33 (L) >60 ml/min/1.73m2    Calcium 9.0 8.5 - 10.1 mg/dL    Bilirubin, total 0.4 0.2 - 1.0 mg/dL    AST (SGOT) 40 (H) 15 - 37 U/L    ALT (SGPT) 59 12 - 78 U/L    Alk. phosphatase 139 (H) 45 - 117 U/L    Protein, total 7.8 6.4 - 8.2 g/dL    Albumin 1.9 (L) 3.5 - 5.0 g/dL    Globulin 5.9 (H) 2.0 - 4.0 g/dL    A-G Ratio 0.3 (L) 1.1 - 2.2     CBC WITH AUTOMATED DIFF    Collection Time: 04/09/22  3:32 AM   Result Value Ref Range    WBC 24.0 (H) 3.6 - 11.0 K/uL    RBC 4.30 3.80 - 5.20 M/uL    HGB 12.1 11.5 - 16.0 g/dL    HCT 39.2 35.0 - 47.0 %    MCV 91.2 80.0 - 99.0 FL    MCH 28.1 26.0 - 34.0 PG    MCHC 30.9 30.0 - 36.5 g/dL    RDW 17.0 (H) 11.5 - 14.5 %    PLATELET 730 812 - 196 K/uL    MPV 10.8 8.9 - 12.9 FL    NRBC 0.1 (H) 0.0  WBC    ABSOLUTE NRBC 0.02 (H) 0.00 - 0.01 K/uL    NEUTROPHILS 88 (H) 32 - 75 %    LYMPHOCYTES 9 (L) 12 - 49 %    MONOCYTES 2 (L) 5 - 13 %    EOSINOPHILS 0 0 - 7 %    BASOPHILS 0 0 - 1 %    IMMATURE GRANULOCYTES 1 (H) 0 - 0.5 %    ABS. NEUTROPHILS 21.3 (H) 1.8 - 8.0 K/UL    ABS. LYMPHOCYTES 2.0 0.8 - 3.5 K/UL    ABS. MONOCYTES 0.4 0.0 - 1.0 K/UL    ABS. EOSINOPHILS 0.0 0.0 - 0.4 K/UL    ABS. BASOPHILS 0.0 0.0 - 0.1 K/UL    ABS. IMM.  GRANS. 0.2 (H) 0.00 - 0.04 K/UL    DF AUTOMATED     C REACTIVE PROTEIN, QT    Collection Time: 04/09/22  3:32 AM   Result Value Ref Range    C-Reactive protein 11.50 (H) 0.00 - 0.60 mg/dL   PROCALCITONIN    Collection Time: 04/09/22  3:32 AM   Result Value Ref Range    Procalcitonin 3.23 (H) 0 ng/mL   BLOOD GAS, ARTERIAL    Collection Time: 04/09/22  3:37 AM   Result Value Ref Range    pH 7.45 7.35 - 7.45      PCO2 35 35 - 45 mmHg    PO2 81 75 - 100 mmHg    O2 SAT 97 >95 %    BICARBONATE 25 22 - 26 mmol/L    BASE EXCESS 0.7 0 - 2 mmol/L    O2 METHOD RA      FIO2 21 %    Sample source Arterial      SITE Right Radial      EVELIN'S TEST Positive     GLUCOSE, POC    Collection Time: 04/09/22  5:25 AM   Result Value Ref Range    Glucose (POC) 209 (H) 65 - 117 mg/dL    Performed by Kiersten Dukes, TROUGH    Collection Time: 04/09/22 10:30 AM   Result Value Ref Range    Vancomycin,trough 21.5 (HH) 5.0 - 10.0 ug/mL   GLUCOSE, POC    Collection Time: 04/09/22 11:33 AM   Result Value Ref Range    Glucose (POC) 216 (H) 65 - 117 mg/dL    Performed by Arpita KEENE          XR CHEST PORT   Final Result   No significant change. XR CHEST PORT   Final Result      XR CHEST PORT   Final Result   No acute cardiopulmonary abnormality. .       XR CHEST PORT   Final Result      CT HEAD WO CONT   Final Result   Chronic changes which appear stable. No acute or active intracranial process. Chronic paranasal sinus disease         XR CHEST PORT   Final Result   No acute findings.            Patient Active Problem List   Diagnosis Code    HTN (hypertension) I10    Hyperlipidemia E78.5    Neuropathy G62.9    Sciatica M54.30    Diabetes mellitus with neurological manifestations, uncontrolled [id number]    Infection of nailbed of toe of left foot L03.032    PAD (peripheral artery disease) ([facility]) I73.9    Hypercalcemia E83.52    DM type 2 causing vascular disease ([address] E11.59    Type 2 diabetes mellitus with nephropathy ([address] E11.21    Acute osteomyelitis of ankle or foot ([address] M86.179    Diabetic peripheral neuropathy ([address] E11.42    Spinal stenosis in cervical region M48.02    Ischemia of both lower extremities I99.8    Pain in both lower legs M79.661, M79.662    CVA (cerebral vascular accident) ([address] I63.9    Elevated troponin R77.8    Hyperkalemia E87.5    UTI (urinary tract infection) N39.0    Sepsis ([facility]) A41.9    RICARDO (acute kidney injury) ([address] N17.9    AMS (altered mental status) R41.82 DIAGNOSES:  1. Hypernatremia- continue to get worse   2. UTI/sepsis on Zosyn and vancomycin  3. Gangrene of left great toe  4. Acute kidney injury on chronic kidney disease  5. Encephalopathy  6. Acute on chronic anemia  7. History of CVA  8. Diabetes mellitus with complications  9.  Peripheral arterial disease  DISCUSSION:  Dextrose 5 % at 75 ml/hr   Control hyperglycemia adjusting insulin  Hold lasix if pt is still on       Thank you

## 2022-04-09 NOTE — PROGRESS NOTES
Infectious Disease Progress Note               Subjective:   Pt seen and examined at bedside. She is being followed by Dr Jorge Lowry for sepsis due to UTI, infected sacal ulcer and hypoxic respiratory failure. She is not requiring pressor support and is on RA. Wbc up to 24 on todays labs, but pt on Solumedrol 40 mg q 6 hours. Blood Cx from  is neg, Pseudomonas species isolated from urine (). She is on  Vanc, fluconazole and Zosyn. ICU saff deny acute events in the past 24 hours. Daughters at bedside, up dated on clinical status   Objective:   Physical Exam:     Visit Vitals  /60 (BP 1 Location: Right upper arm, BP Patient Position: At rest)   Pulse 83   Temp 98.8 °F (37.1 °C)   Resp 20   Ht 5' 7.99\" (1.727 m)   Wt 190 lb 7.6 oz (86.4 kg)   SpO2 97%   BMI 28.97 kg/m²    O2 Flow Rate (L/min): 0 l/min O2 Device: None (Room air)    Temp (24hrs), Av.4 °F (36.9 °C), Min:97.7 °F (36.5 °C), Max:98.8 °F (37.1 °C)    No intake/output data recorded.     1901 -  0700  In: -   Out: 4000 [Urine:3700; Drains:300]    General: NAD, alert, attempting to communicate   HEENT: GUMARO, mouth breather, dry mucosa   Lungs: decreased at the bases, no wheeze/rhonchi   Heart: S1S2+, RRR, no murmur  Abdo: Peg tube in place, ND, +BS   : + indwelling giraldo cath   Exts: Gangrenous left great toe, no leg edema   Skin: Unstageable sacral decubitus ulcer, + flexiseal, no exposed bone     Data Review:       Recent Days:  Recent Labs     22  0332 22  0620 22  0814   WBC 24.0* 17.6* 15.1*   HGB 12.1 11.7 10.6*   HCT 39.2 37.9 33.9*    408* 372     Recent Labs     22  0332 22  0620   BUN 59* 47*   CREA 1.53* 1.41*       Lab Results   Component Value Date/Time    C-Reactive protein 11.50 (H) 2022 03:32 AM          Microbiology     Results     Procedure Component Value Units Date/Time    CULTURE, BLOOD, PAIRED [[id number]]     Order Status: Sent Specimen: Blood CULTURE, RESPIRATORY/SPUTUM/BRONCH Aundria Matt STAIN [[id number]]     Order Status: Sent Specimen: Sputum     CULTURE, URINE [[id number]] Collected: 04/07/22 1400    Order Status: Completed Specimen: Urine Updated: 04/09/22 1111     Special Requests: No Special Requests        Meredith Count --        >100,000  colonies/ml       Culture result:       POSSIBLE Pseudomonas species          CULTURE, BLOOD #1 [[id number]]     Order Status: Canceled Specimen: Blood     CULTURE, BLOOD #2 [[id number]]     Order Status: Canceled Specimen: Blood     MRSA SCREEN - PCR (NASAL) [[id number]] Collected: 04/04/22 0345    Order Status: Completed Specimen: Swab Updated: 04/04/22 0613     MRSA by PCR, Nasal Not Detected       COVID-19 RAPID TEST [[id number]] Collected: 04/03/22 2324    Order Status: Completed Specimen: Nasopharyngeal Updated: 04/04/22 0027     COVID-19 rapid test Not Detected        Comment: Rapid Abbott ID Now   Rapid NAAT:  The specimen is NEGATIVE for SARS-CoV-2, the novel coronavirus associated with COVID-19. Negative results should be treated as presumptive and, if inconsistent with clinical signs and symptoms or necessary for patient management, should be tested with an alternative molecular assay. Negative results do not preclude SARS-CoV-2 infection and should not be used as the sole basis for patient management decisions. This test has been authorized by the FDA under   an Emergency Use Authorization (EUA) for use by authorized laboratories.  Fact sheet for Healthcare Providers: ConventionUpdate.co.nz Fact sheet for Patients: ConventionUpdate.co.nz   Methodology: Isothermal Nucleic Acid Amplification         CULTURE, BLOOD, PAIRED [[id number]] Collected: 04/03/22 2243    Order Status: Completed Specimen: Blood Updated: 04/09/22 0853     Special Requests: No Special Requests        Culture result: No growth 5 days       CULTURE, URINE [[id number]] Collected: 04/03/22 2243    Order Status: Canceled Specimen: Urine              Diagnostics   CXR Results  (Last 48 hours)               04/09/22 0225  XR CHEST PORT Final result    Impression:  No significant change. Narrative:  Chest one view. AP semiupright portable at 0228 dated 4/9/2022. Comparison 4/8/2022. The cardiomediastinal silhouette is stable. The pulmonary   blood flow is normal. The lungs are clear. The pleural surfaces are smooth. The   right hemidiaphragm is elevated. There is incompletely imaged cervical spine hardware. 04/08/22 0744  XR CHEST PORT Final result    Narrative:  Chest single view. Comparison single view chest April 8, 2022 at 3:40. Lungs clear; no interstitial or alveolar pulmonary edema. Cardiac and   mediastinal structures unchanged. No pneumothorax or pleural effusion. Cervical neck hardware. 04/08/22 0346  XR CHEST PORT Final result    Impression:  No acute cardiopulmonary abnormality. .        Narrative:  HISTORY:  Congested lung sounds (Crackles and rhonchi)       TECHNIQUE:  XR CHEST PORT       COMPARISON: 4/6/2022   LIMITATIONS: None       TUBES/LINES: None       LUNG PARENCHYMA: Normal   TRACHEA/BRONCHI: Normal   PULMONARY VESSELS: Normal   PLEURA: Normal   HEART: Normal   AORTIC SHADOW:Normal.     MEDIASTINUM: Normal   BONE/SOFT TISSUES: No acute abnormality. OTHER: None                    Assessment/Plan     1.  Sepsis due to UTI, w isolation of Pseudomonas species from urine Cx, suspected aspiration PNA       Afebrile, hemodynamically stable off pressor support       WBC up to 24K from 17K, CRP 3.23 and CRP 11.50      STAT Blood Cx from 04/08 not done, reordered, no productive sputum for Cx       Continue on Vanc, Zosyn and Fluconazole for now       Routine labs in the morning     2. UTI, recurrent, underlying neurogenic bladder, + indwelling giraldo       Pseudomonas species isolated from Cx (04/08)       Continue on Zosyn pending susceptibility results    3. Unstageable sacral ulcer, necrotic tissue, minimal drainage     4. PAD stable dry gangrenous changed involving left great toe     5. Acute hypoxia, required Ventmask, currently on RA      Clear lungs on CXR (04/09). On solumedrol 40 mg q 6H      Ongoing risk factors for aspiration PNA, unable to clear secretion, and on tube feeds       Rec steroid taper, but will defer to pulmonary     6.  Loose stools likely from antimicrobial therapy: Flexiseal in place        Loa Lombard, MD    4/9/2022

## 2022-04-09 NOTE — PROGRESS NOTES
IMPRESSION:   1. Sepsis  2. Aspiration pneumonia  3. Urinary tract infection  4. Acute kidney injury  5. Hyperkalemia resolved  6. History of CVA with right-sided weakness  7. Anemia  8. Hypernatremia  9. Hyperkalemia  10. Gangrene of the left great toe sacral decubiti ulcer      RECOMMENDATIONS/PLAN:   1. She is on Zosyn   2. Patient was wheezing and she is on Solu-Medrol will give nebulizer treatment aspiration precaution  3. She is on Lasix now she is on room air arterial blood gases shows PO2 65 PCO2 31  start patient on oxygen per protocol  4. ABG acceptable and chest x-ray no acute  5. Chest x-ray no acute infiltrate  6. WBC elevated most likely secondary to steroids  7. recieve packed RBC today's Hb 12.1  8. Patient was tachypneic tachycardic rapid response was called this morning arterial blood gases was done condition got worse transferred to ICU she is still a full code   Time spent patient care 30 minutes  [x] High complexity decision making was performed  [x] See my orders for details  HPI  70-year-old lady nursing home resident came in as she was found confused with altered mental status family noted that she was not acting properly she is in a nursing home unable to get any history to the patient she has significant past medical history of CVA right-sided weakness hypertension diabetes mellitus she is bedridden open eyes does not follow any command possibly septic so admitted, and critical care consult was called. PMH:  has a past medical history of Diabetes mellitus ([address] HTN (hypertension), Hyperlipidemia, Neuropathy, PAD (peripheral artery disease) ([address] and Sciatica. PSH:   has a past surgical history that includes hx other surgical (Bilateral); hx amputation (Right); hx other surgical; hx cervical fusion; hx vascular stent (Bilateral); and hx vascular stent (Bilateral). FHX: family history includes Cancer in her mother; Diabetes in her mother; Hypertension in her mother.      SHX: reports that she has never smoked. She has never used smokeless tobacco. She reports previous alcohol use. She reports that she does not use drugs. ALL: No Known Allergies     MEDS:   [x] Reviewed - As Below   [] Not reviewed    Current Facility-Administered Medications   Medication    furosemide (LASIX) injection 40 mg    fluconazole (DIFLUCAN) 200mg/100 mL IVPB (premix)    VANCOMYCIN INFORMATION NOTE    vancomycin (VANCOCIN) 500 mg in 0.9% sodium chloride (MBP/ADV) 100 mL MBP    Vancomycin trough level - please draw prior to dose on 4/9 @ 1100. Thanks!     insulin glargine (LANTUS) injection 22 Units    methylPREDNISolone (PF) (SOLU-MEDROL) injection 40 mg    And    insulin NPH (NOVOLIN N, HUMULIN N) injection 24 Units    labetaloL (NORMODYNE;TRANDATE) 20 mg/4 mL (5 mg/mL) injection 20 mg    hydrALAZINE (APRESOLINE) 20 mg/mL injection 20 mg    albuterol-ipratropium (DUO-NEB) 2.5 MG-0.5 MG/3 ML    0.9% sodium chloride infusion 250 mL    gabapentin (NEURONTIN) capsule 300 mg    0.9% sodium chloride infusion 250 mL    insulin lispro (HUMALOG) injection    atorvastatin (LIPITOR) tablet 20 mg    latanoprost (XALATAN) 0.005 % ophthalmic solution 1 Drop    aspirin delayed-release tablet 81 mg    brimonidine (ALPHAGAN) 0.2 % ophthalmic solution 1 Drop    glucose chewable tablet 16 g    glucagon (GLUCAGEN) injection 1 mg    acetaminophen (TYLENOL) suppository 650 mg    polyethylene glycol (MIRALAX) packet 17 g    ondansetron (ZOFRAN ODT) tablet 4 mg    Or    ondansetron (ZOFRAN) injection 4 mg    heparin (porcine) injection 5,000 Units    piperacillin-tazobactam (ZOSYN) 3.375 g in 0.9% sodium chloride (MBP/ADV) 100 mL MBP    albuterol-ipratropium (DUO-NEB) 2.5 MG-0.5 MG/3 ML    ferrous sulfate 300 mg (60 mg iron)/5 mL oral syrup 300 mg    acetaminophen (TYLENOL) solution 650 mg      MAR reviewed and pertinent medications noted or modified as needed   Current Facility-Administered Medications Medication    furosemide (LASIX) injection 40 mg    fluconazole (DIFLUCAN) 200mg/100 mL IVPB (premix)    VANCOMYCIN INFORMATION NOTE    vancomycin (VANCOCIN) 500 mg in 0.9% sodium chloride (MBP/ADV) 100 mL MBP    Vancomycin trough level - please draw prior to dose on 4/9 @ 1100. Thanks!  insulin glargine (LANTUS) injection 22 Units    methylPREDNISolone (PF) (SOLU-MEDROL) injection 40 mg    And    insulin NPH (NOVOLIN N, HUMULIN N) injection 24 Units    labetaloL (NORMODYNE;TRANDATE) 20 mg/4 mL (5 mg/mL) injection 20 mg    hydrALAZINE (APRESOLINE) 20 mg/mL injection 20 mg    albuterol-ipratropium (DUO-NEB) 2.5 MG-0.5 MG/3 ML    0.9% sodium chloride infusion 250 mL    gabapentin (NEURONTIN) capsule 300 mg    0.9% sodium chloride infusion 250 mL    insulin lispro (HUMALOG) injection    atorvastatin (LIPITOR) tablet 20 mg    latanoprost (XALATAN) 0.005 % ophthalmic solution 1 Drop    aspirin delayed-release tablet 81 mg    brimonidine (ALPHAGAN) 0.2 % ophthalmic solution 1 Drop    glucose chewable tablet 16 g    glucagon (GLUCAGEN) injection 1 mg    acetaminophen (TYLENOL) suppository 650 mg    polyethylene glycol (MIRALAX) packet 17 g    ondansetron (ZOFRAN ODT) tablet 4 mg    Or    ondansetron (ZOFRAN) injection 4 mg    heparin (porcine) injection 5,000 Units    piperacillin-tazobactam (ZOSYN) 3.375 g in 0.9% sodium chloride (MBP/ADV) 100 mL MBP    albuterol-ipratropium (DUO-NEB) 2.5 MG-0.5 MG/3 ML    ferrous sulfate 300 mg (60 mg iron)/5 mL oral syrup 300 mg    acetaminophen (TYLENOL) solution 650 mg      PMH:  has a past medical history of Diabetes mellitus ([address] HTN (hypertension), Hyperlipidemia, Neuropathy, PAD (peripheral artery disease) ([facility] [address] and Sciatica. PSH:   has a past surgical history that includes hx other surgical (Bilateral); hx amputation (Right); hx other surgical; hx cervical fusion; hx vascular stent (Bilateral); and hx vascular stent (Bilateral).    FHX: family history includes Cancer in her mother; Diabetes in her mother; Hypertension in her mother. SHX:  reports that she has never smoked. She has never used smokeless tobacco. She reports previous alcohol use. She reports that she does not use drugs. ROS:  Unable to obtain barely unresponsive    Hemodynamics:    CO:    CI:    CVP:    SVR:   PAP Systolic:    PAP Diastolic:    PVR:    PL77:        Ventilator Settings:      Mode Rate TV Press PEEP FiO2 PIP Min. Vent                              Vital Signs: Telemetry:    normal sinus rhythm Intake/Output:   Visit Vitals  BP (!) 157/64 (BP 1 Location: Right upper arm, BP Patient Position: At rest)   Pulse 82   Temp 97.7 °F (36.5 °C)   Resp 17   Ht 5' 7.99\" (1.727 m)   Wt 86.4 kg (190 lb 7.6 oz)   SpO2 97%   BMI 28.97 kg/m²       Temp (24hrs), Av.3 °F (37.4 °C), Min:97.7 °F (36.5 °C), Max:102.2 °F (39 °C)        O2 Device: None (Room air) O2 Flow Rate (L/min): 0 l/min       Wt Readings from Last 4 Encounters:   22 86.4 kg (190 lb 7.6 oz)   22 82.2 kg (181 lb 3.5 oz)   20 84.4 kg (186 lb)   20 84.8 kg (187 lb)          Intake/Output Summary (Last 24 hours) at 2022 1003  Last data filed at 2022 0533  Gross per 24 hour   Intake --   Output 2175 ml   Net -2175 ml       Last shift:      No intake/output data recorded. Last 3 shifts:  1901 -  0700  In: -   Out: 4000 [Urine:3700; Drains:300]       Physical Exam:     General: Open eyes but does not follow any command  HEENT: NCAT, poor dentition, lips and mucosa dry  Eyes: anicteric; conjunctiva clear  Neck: no nodes, trach midline; no accessory MM use. Chest: no deformity,   Cardiac: R regular; no murmur;   Lungs: distant breath sounds; no wheezes  Abd: soft, NT, hypoactive BS  Ext: no edema; no joint swelling;  No clubbing  : NO giraldo, clear urine  Neuro: She only moves her left arm  Psych- unable to assess  Skin: warm, dry, no cyanosis;   Pulses: 1-2+ Bilateral pedal, radial  Capillary: brisk; pale      DATA:    MAR reviewed and pertinent medications noted or modified as needed  MEDS:   Current Facility-Administered Medications   Medication    furosemide (LASIX) injection 40 mg    fluconazole (DIFLUCAN) 200mg/100 mL IVPB (premix)    VANCOMYCIN INFORMATION NOTE    vancomycin (VANCOCIN) 500 mg in 0.9% sodium chloride (MBP/ADV) 100 mL MBP    Vancomycin trough level - please draw prior to dose on 4/9 @ 1100. Thanks!     insulin glargine (LANTUS) injection 22 Units    methylPREDNISolone (PF) (SOLU-MEDROL) injection 40 mg    And    insulin NPH (NOVOLIN N, HUMULIN N) injection 24 Units    labetaloL (NORMODYNE;TRANDATE) 20 mg/4 mL (5 mg/mL) injection 20 mg    hydrALAZINE (APRESOLINE) 20 mg/mL injection 20 mg    albuterol-ipratropium (DUO-NEB) 2.5 MG-0.5 MG/3 ML    0.9% sodium chloride infusion 250 mL    gabapentin (NEURONTIN) capsule 300 mg    0.9% sodium chloride infusion 250 mL    insulin lispro (HUMALOG) injection    atorvastatin (LIPITOR) tablet 20 mg    latanoprost (XALATAN) 0.005 % ophthalmic solution 1 Drop    aspirin delayed-release tablet 81 mg    brimonidine (ALPHAGAN) 0.2 % ophthalmic solution 1 Drop    glucose chewable tablet 16 g    glucagon (GLUCAGEN) injection 1 mg    acetaminophen (TYLENOL) suppository 650 mg    polyethylene glycol (MIRALAX) packet 17 g    ondansetron (ZOFRAN ODT) tablet 4 mg    Or    ondansetron (ZOFRAN) injection 4 mg    heparin (porcine) injection 5,000 Units    piperacillin-tazobactam (ZOSYN) 3.375 g in 0.9% sodium chloride (MBP/ADV) 100 mL MBP    albuterol-ipratropium (DUO-NEB) 2.5 MG-0.5 MG/3 ML    ferrous sulfate 300 mg (60 mg iron)/5 mL oral syrup 300 mg    acetaminophen (TYLENOL) solution 650 mg        Labs:    Recent Labs     04/09/22 0332 04/08/22  0620 04/07/22  0814   WBC 24.0* 17.6* 15.1*   HGB 12.1 11.7 10.6*    408* 372     Recent Labs     04/09/22  0332 04/08/22  0620 04/06/22  1128   * 151* 147* K 4.3 5.2* 4.8   * 119* 119*   CO2 25 25 23   * 416* 353*   BUN 59* 47* 55*   CREA 1.53* 1.41* 1.47*   CA 9.0 8.7 8.4*   ALB 1.9* 1.8* 1.7*   ALT 59 64 78     Recent Labs     04/09/22  0337 04/08/22  0715   PH 7.45 7.51*   PCO2 35 31*   PO2 81 65*   HCO3 25 26   FIO2 21 21.0     No results for input(s): CPK, CKNDX, TROIQ in the last 72 hours. No lab exists for component: CPKMB  No results found for: BNPP, BNP   Lab Results   Component Value Date/Time    Culture result: Gram Negative Rods (A) 04/07/2022 02:00 PM    Culture result: checking for possible  OTHER ORGANISMS   04/07/2022 02:00 PM    Culture result: No growth 5 days 04/03/2022 10:43 PM     Lab Results   Component Value Date/Time    TSH 2.880 12/12/2016 10:13 AM        Imaging:    Results from [place] encounter on 04/03/22    XR CHEST PORT    Narrative  Chest one view. AP semiupright portable at 0228 dated 4/9/2022. Comparison 4/8/2022. The cardiomediastinal silhouette is stable. The pulmonary  blood flow is normal. The lungs are clear. The pleural surfaces are smooth. The  right hemidiaphragm is elevated. There is incompletely imaged cervical spine hardware. Impression  No significant change. Results from [place] encounter on 04/03/22    CT HEAD WO CONT    Narrative  PROCEDURE: CT HEAD WO CONT    HISTORY:Altered mental status    COMPARISON:Head CT 3 March 2022    Department policy stipulates all CT scans at this facility are performed using  dose reduction optimization techniques as appropriate to the performed exam,  including the following: Automated exposure control, adjustments of the mA  and/or KVP according to the patient size, and the use of iterative  reconstruction technique. TECHNIQUE: Without IV contrast    Bones/extracranial soft tissues:  Maxillary sinuses and right frontal sinus  remains nearly completely opacified.   Extra-axial spaces:  No hemorrhage, mass or focal fluid collection. Ventricles/sulci:  There is mild dilatation of the ventricles. Sulci are  prominent. Brain parenchyma: An area of encephalomalacia in the left frontal lobe is  showing chronic evolution without evidence of hemorrhagic conversion. No other  focal lesions identified. No mass effect. There is good gray-white differentiation. There are  inhomogeneous areas of mildly decreased density in periventricular white matter. Impression  Chronic changes which appear stable. No acute or active intracranial process.   Chronic paranasal sinus disease

## 2022-04-09 NOTE — PROGRESS NOTES
PROGRESS NOTE    Patient: Shorty Duong MRN: [id number]  SSN: [id number]    YOB: 1945  Age: 78 y.o. Sex: female      Admit Date: 4/3/2022    LOS: 5 days       Subjective     Chief Complaint   Patient presents with    Altered mental status       HPI: Patient is a 78y.o. 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 04/05   Patient is seen at bedside with daughter and nephew today. Patient is in distress due to pain and daughter notes that patient gets Neurontin TID for neuropathy daily. Otherwise, patient is still receiving enteric feeding through PEG tube and getting IVF. Patient was seen by nephrology yesterday. Recommends obtaining renal panel and continuing IVF. Patient was seen by ID yesterday. Recommends continuing IV vancomycin for urosepsis. Patient was seen by pulmonology today. Recommends PRBC transfusion. Labs indicate WBC 16.9, Hgb 6.3, Na 148, Cl 120, BUN 72, Cr 1.71, Ca 8, CRP 28.4, pro-carrie 6.93 and .       04/06   Patient is seen at bedside. Patient received 2 units of PRBC. Patient is on Zosyn. No new changes today. Patient seen by the vascular surgeon he recommend great toe amputation  And for sacral wound recommend Medihoney ointment     Labs show increasing Hgb of 9, decreasing WBC 14.7, Na 147, , BUN 55,   Cr 1.47, and CRP 21.4. CXR is unremarkable. 04/07  Patient is seen resting at bedside.  Patient received one unit of PRBC with increased Hgb of 10.6. No acute changes today. Patient is seen by nephrology who recommends free water flushes to correct hypernatremia. Labs show increasing Hgb of 10.6, WBC 15.1, pro-calcitonin 3.78, and CRP 16.1.    4/8    Patient had a rapid response this morning ABG and chest x-ray was ordered  Patient tachycardic tachypneic  ABG  pH 7.51 PCO2 31 PO2 65 bicarb 26 oxygen saturation 95% on room air    Chest x-ray shows no much changes    4/9    Patient resting the bed open eyes  On room air/breathing through the mouth    Patient's daughter is at the bedside      ROS  Unable to obtain. Objective     Visit Vitals  BP (!) 157/64 (BP 1 Location: Right upper arm, BP Patient Position: At rest)   Pulse 82   Temp 98.2 °F (36.8 °C)   Resp 17   Ht 5' 7.99\" (1.727 m)   Wt 86.4 kg (190 lb 7.6 oz)   SpO2 97%   BMI 28.97 kg/m²    O2 Flow Rate (L/min): 0 l/min O2 Device: None (Room air)    Physical Exam:   Physical Exam  Constitutional: Open eyes do not follow any commands. Tracks with eyes. HENT:   Head: Normocephalic and atraumatic. Eyes: Pupils are equal, round, and reactive to light. EOM are normal.   Cardiovascular: Normal rate, regular rhythm and normal heart sounds. Pulmonary/Chest: Decreased breath sound   abdominal: Soft. Bowel sounds are normal. There is no abdominal tenderness. There is no rebound and no guarding. Musculoskeletal: Normal range of motion. Neurological: Open eyes do not follow any, right-sided weakness  Intake & Output:  Current Shift: No intake/output data recorded. Last three shifts: 04/07 1901 - 04/09 0700  In: -   Out: 4000 [Urine:3700; Drains:300]    Lab/Data Review: All lab results for the last 24 hours reviewed.        24 Hour Results:    Recent Results (from the past 24 hour(s))   GLUCOSE, POC    Collection Time: 04/08/22  3:37 PM   Result Value Ref Range    Glucose (POC) 292 (H) 65 - 117 mg/dL    Performed by CECILIA Mcclain Collection Time: 04/08/22  5:04 PM   Result Value Ref Range    Glucose (POC) 254 (H) 65 - 117 mg/dL    Performed by aYdi MILLER, POC    Collection Time: 04/08/22  8:31 PM   Result Value Ref Range    Glucose (POC) 254 (H) 65 - 117 mg/dL    Performed by [address] [place] [address], POC    Collection Time: 04/08/22 11:52 PM   Result Value Ref Range    Glucose (POC) 194 (H) 65 - 117 mg/dL    Performed by [address] [place] [address], [facility]    Collection Time: 04/09/22  3:32 AM   Result Value Ref Range    Sodium 155 (H) 136 - 145 mmol/L    Potassium 4.3 3.5 - 5.1 mmol/L    Chloride 124 (H) 97 - 108 mmol/L    CO2 25 21 - 32 mmol/L    Anion gap 6 5 - 15 mmol/L    Glucose 227 (H) 65 - 100 mg/dL    BUN 59 (H) 6 - 20 mg/dL    Creatinine 1.53 (H) 0.55 - 1.02 mg/dL    BUN/Creatinine ratio 39 (H) 12 - 20      GFR est AA 40 (L) >60 ml/min/1.73m2    GFR est non-AA 33 (L) >60 ml/min/1.73m2    Calcium 9.0 8.5 - 10.1 mg/dL    Bilirubin, total 0.4 0.2 - 1.0 mg/dL    AST (SGOT) 40 (H) 15 - 37 U/L    ALT (SGPT) 59 12 - 78 U/L    Alk. phosphatase 139 (H) 45 - 117 U/L    Protein, total 7.8 6.4 - 8.2 g/dL    Albumin 1.9 (L) 3.5 - 5.0 g/dL    Globulin 5.9 (H) 2.0 - 4.0 g/dL    A-G Ratio 0.3 (L) 1.1 - 2.2     CBC WITH AUTOMATED DIFF    Collection Time: 04/09/22  3:32 AM   Result Value Ref Range    WBC 24.0 (H) 3.6 - 11.0 K/uL    RBC 4.30 3.80 - 5.20 M/uL    HGB 12.1 11.5 - 16.0 g/dL    HCT 39.2 35.0 - 47.0 %    MCV 91.2 80.0 - 99.0 FL    MCH 28.1 26.0 - 34.0 PG    MCHC 30.9 30.0 - 36.5 g/dL    RDW 17.0 (H) 11.5 - 14.5 %    PLATELET 316 144 - 434 K/uL    MPV 10.8 8.9 - 12.9 FL    NRBC 0.1 (H) 0.0  WBC    ABSOLUTE NRBC 0.02 (H) 0.00 - 0.01 K/uL    NEUTROPHILS 88 (H) 32 - 75 %    LYMPHOCYTES 9 (L) 12 - 49 %    MONOCYTES 2 (L) 5 - 13 %    EOSINOPHILS 0 0 - 7 %    BASOPHILS 0 0 - 1 %    IMMATURE GRANULOCYTES 1 (H) 0 - 0.5 %    ABS. NEUTROPHILS 21.3 (H) 1.8 - 8.0 K/UL    ABS. LYMPHOCYTES 2.0 0.8 - 3.5 K/UL    ABS. MONOCYTES 0.4 0.0 - 1.0 K/UL    ABS. EOSINOPHILS 0.0 0.0 - 0.4 K/UL    ABS. BASOPHILS 0.0 0.0 - 0.1 K/UL    ABS. IMM. GRANS. 0.2 (H) 0.00 - 0.04 K/UL    DF AUTOMATED     C REACTIVE PROTEIN, QT    Collection Time: 04/09/22  3:32 AM   Result Value Ref Range    C-Reactive protein 11.50 (H) 0.00 - 0.60 mg/dL   PROCALCITONIN    Collection Time: 04/09/22  3:32 AM   Result Value Ref Range    Procalcitonin 3.23 (H) 0 ng/mL   BLOOD GAS, ARTERIAL    Collection Time: 04/09/22  3:37 AM   Result Value Ref Range    pH 7.45 7.35 - 7.45      PCO2 35 35 - 45 mmHg    PO2 81 75 - 100 mmHg    O2 SAT 97 >95 %    BICARBONATE 25 22 - 26 mmol/L    BASE EXCESS 0.7 0 - 2 mmol/L    O2 METHOD RA      FIO2 21 %    Sample source Arterial      SITE Right Radial      EVELIN'S TEST Positive     GLUCOSE, POC    Collection Time: 04/09/22  5:25 AM   Result Value Ref Range    Glucose (POC) 209 (H) 65 - 117 mg/dL    Performed by Alina Garza, TROUGH    Collection Time: 04/09/22 10:30 AM   Result Value Ref Range    Vancomycin,trough 21.5 (HH) 5.0 - 10.0 ug/mL   GLUCOSE, POC    Collection Time: 04/09/22 11:33 AM   Result Value Ref Range    Glucose (POC) 216 (H) 65 - 117 mg/dL    Performed by Mónica Goldberg          Imaging:    XR CHEST PORT   Final Result   No significant change. XR CHEST PORT   Final Result      XR CHEST PORT   Final Result   No acute cardiopulmonary abnormality. .       XR CHEST PORT   Final Result      CT HEAD WO CONT   Final Result   Chronic changes which appear stable. No acute or active intracranial process. Chronic paranasal sinus disease         XR CHEST PORT   Final Result   No acute findings.              Assessment   Severe sepsis with UTI  UTI  Acute on chronic kidney disease  Hyperkalemia  Hypernatremia  CVA with right-sided weakness  Anemia of chronic disease  Gangrene of the left great toe  Sacral decubitus ulcer  Acute respiratory failure  Possible aspiration  Elevated BNP  Plan   Transfer the patient to ICU  Lasix 40 mg IV daily  Hypernatremia follow-up with nephrology  Hydralazine 50 mg 3 times a day  IV Zosyn   sliding scale insulin  Aspirin 81 mg daily  Lipitor 20 mg daily  Alphagan 0.2% 3 times a day  Ferrous sulfate 325 mg twice a day  Heparin 5000 units subcu every 8 hours  Half-normal saline at 100 cc/h  Duo-neb 2.5 mg-0.5 mg PRN  Daily renal panel per nephrology recommendation. Full code. Start on half normal saline IVF.   Pulmonary nephrology infectious disease and vascular surgical.  Follow-up with the vascular surgeon regarding amputation of the left great toe    Start PEG tube feeding with water flush    Discussed with the patient daughter at the bedside    Update patient condition with the patient daughter and   Discontinue gabapentin discussed with the patient's daughters    Current Facility-Administered Medications:     hydrALAZINE (APRESOLINE) tablet 50 mg, 50 mg, Oral, TID, Foreign Soto MD    furosemide (LASIX) injection 40 mg, 40 mg, IntraVENous, DAILY, Israel Soto MD, 40 mg at 04/09/22 1054    fluconazole (DIFLUCAN) 200mg/100 mL IVPB (premix), 200 mg, IntraVENous, Q24H, Adina Smart MD, Last Rate: 100 mL/hr at 04/09/22 1053, 200 mg at 04/09/22 1053    VANCOMYCIN INFORMATION NOTE, , Other, Rx Dosing/Monitoring, Adina Smart MD    vancomycin (VANCOCIN) 500 mg in 0.9% sodium chloride (MBP/ADV) 100 mL MBP, 500 mg, IntraVENous, Q12H, Adina Smart MD, Last Rate: 100 mL/hr at 04/09/22 1052, 500 mg at 04/09/22 1052    insulin glargine (LANTUS) injection 22 Units, 22 Units, SubCUTAneous, BID, Israel Soto MD, 22 Units at 04/09/22 1054    methylPREDNISolone (PF) (SOLU-MEDROL) injection 40 mg, 40 mg, IntraVENous, Q6H, 40 mg at 04/09/22 1101 **AND** insulin NPH (NOVOLIN N, HUMULIN N) injection 24 Units, 24 Units, SubCUTAneous, Q6H, Israel Soto MD, 24 Units at 04/09/22 1101    labetaloL (NORMODYNE;TRANDATE) 20 mg/4 mL (5 mg/mL) injection 20 mg, 20 mg, IntraVENous, Q4H PRN, Israel Soto MD, 20 mg at 04/09/22 0609    hydrALAZINE (APRESOLINE) 20 mg/mL injection 20 mg, 20 mg, IntraVENous, Q6H PRN, Israel Soto MD, 20 mg at 04/09/22 1054    albuterol-ipratropium (DUO-NEB) 2.5 MG-0.5 MG/3 ML, 3 mL, Nebulization, Q6HWA RT, Martínez Crespo MD, 3 mL at 04/09/22 0750    0.9% sodium chloride infusion 250 mL, 250 mL, IntraVENous, PRN, Ines Crespo MD  [facility] by provider] gabapentin (NEURONTIN) capsule 300 mg, 300 mg, Oral, TID, Fany Soto MD, 300 mg at 04/09/22 1054    0.9% sodium chloride infusion 250 mL, 250 mL, IntraVENous, PRN, Fany Soto MD    insulin lispro (HUMALOG) injection, , SubCUTAneous, Q6H, Israel Soto MD, 4 Units at 04/09/22 1226    atorvastatin (LIPITOR) tablet 20 mg, 20 mg, Oral, QHS, Israel Soto MD, 20 mg at 04/08/22 2134    latanoprost (XALATAN) 0.005 % ophthalmic solution 1 Drop, 1 Drop, Right Eye, QPM, Diana Barahona MD, 1 Drop at 04/08/22 1716    aspirin delayed-release tablet 81 mg, 81 mg, Oral, DAILY, Israel Soto MD, 81 mg at 04/09/22 1055    brimonidine (ALPHAGAN) 0.2 % ophthalmic solution 1 Drop, 1 Drop, Both Eyes, TID, Israel Soto MD, 1 Drop at 04/09/22 1058    glucose chewable tablet 16 g, 4 Tablet, Oral, PRN, Fany Soto MD    glucagon (GLUCAGEN) injection 1 mg, 1 mg, IntraMUSCular, PRN, Diana Barahona MD  [facility]  [DISCONTINUED] acetaminophen (TYLENOL) tablet 650 mg, 650 mg, Oral, Q6H PRN, 650 mg at 04/04/22 2246 **OR** acetaminophen (TYLENOL) suppository 650 mg, 650 mg, Rectal, Q6H PRN, Fany Soto MD    polyethylene glycol (MIRALAX) packet 17 g, 17 g, Oral, DAILY PRN, Fany Soto MD    ondansetron (ZOFRAN ODT) tablet 4 mg, 4 mg, Oral, Q8H PRN **OR** ondansetron (ZOFRAN) injection 4 mg, 4 mg, IntraVENous, Q6H PRN, Israel Soto MD    heparin (porcine) injection 5,000 Units, 5,000 Units, SubCUTAneous, Q8H, Israel Soto MD, 5,000 Units at 04/09/22 0534    piperacillin-tazobactam (ZOSYN) 3.375 g in 0.9% sodium chloride (MBP/ADV) 100 mL MBP, 3.375 g, IntraVENous, Q8H, Israel Soto MD, Last Rate: 25 mL/hr at 04/09/22 1052, 3.375 g at 04/09/22 1052    albuterol-ipratropium (DUO-NEB) 2.5 MG-0.5 MG/3 ML, 3 mL, Nebulization, Q6H PRN, Jose Soto MD    ferrous sulfate 300 mg (60 mg iron)/5 mL oral syrup 300 mg, 300 mg, Oral, BID, Israel Soto MD, 300 mg at 04/09/22 1054    acetaminophen (TYLENOL) solution 650 mg, 650 mg, Per NG tube, Q6H PRN, Israel Soto MD, 650 mg at 04/08/22 1009    Current Outpatient Medications   Medication Instructions    acidophilus-pectin, citrus 25 million cell -100 mg tab tablet 2 Tablets, Oral, DAILY    amLODIPine (NORVASC) 5 mg, Oral, DAILY    artificial saliva (MOUTH KOTE) spra 1 Spray, Oral, 3 TIMES DAILY    aspirin delayed-release 81 mg tablet Oral, DAILY    atorvastatin (LIPITOR) 20 mg tablet TAKE 1 TABLET BY MOUTH EVERY NIGHT    brimonidine (ALPHAGAN) 0.2 % ophthalmic solution 1 Drop, Both Eyes, 3 TIMES DAILY    ferrous sulfate 325 mg, Oral, 2 TIMES DAILY    gabapentin (NEURONTIN) 300 mg, Oral, 2 TIMES DAILY    insulin glargine (LANTUS) 50 Units, SubCUTAneous, DAILY    latanoprost (XALATAN) 0.005 % ophthalmic solution 1 Drop, Right Eye, EVERY EVENING    lidocaine (XYLOCAINE) 4 % (40 mg/mL) topical solution 1 mL, Topical, AS NEEDED    metoprolol succinate (TOPROL-XL) 50 mg, Oral, 2 TIMES DAILY         Signed By: Rosy Siddiqui MD     April 9, 2022

## 2022-04-10 NOTE — PROGRESS NOTES
Bedside shift change report given to Lady Alexander (oncoming nurse) by Eric Garcia (offgoing nurse). Report included the following information SBAR, Intake/Output, MAR, Recent Results and Quality Measures.

## 2022-04-10 NOTE — PROGRESS NOTES
When attempting to give AM meds, PEG tube noted to be occluded. Unable to aspirate or push through tube. Unable to clear blockage with brush. Notified Dr. Eda Miller, ordered GI consult. Called JP Long left with callback number.

## 2022-04-10 NOTE — PROGRESS NOTES
IMPRESSION:   1. Sepsis  2. Aspiration pneumonia  3. Urinary tract infection  4. Acute kidney injury  5. Hyperkalemia resolved  6. History of CVA with right-sided weakness  7. Anemia  8. Hypernatremia  9. Hyperkalemia  10. Gangrene of the left great toe sacral decubiti ulcer      RECOMMENDATIONS/PLAN:   1. She is on Zosyn   2. Patient was wheezing and she is on Solu-Medrol will give nebulizer treatment aspiration precaution will discontinue Solu-Medrol as blood glucose is elevated  3. She is on Lasix now she is on room air arterial blood gases shows PO2 65 PCO2 31  start patient on oxygen per protocol  4. ABG acceptable and chest x-ray no acute  5. Chest x-ray no acute infiltrate  6. WBC elevated most likely secondary to steroids  7. recieve packed RBC today's Hb 12.1  8. Patient was tachypneic tachycardic rapid response was called  arterial blood gases was done condition got worse transferred to ICU she is still a full code   Time spent patient care 30 minutes  [x] High complexity decision making was performed  [x] See my orders for details  HPI  74-year-old lady nursing home resident came in as she was found confused with altered mental status family noted that she was not acting properly she is in a nursing home unable to get any history to the patient she has significant past medical history of CVA right-sided weakness hypertension diabetes mellitus she is bedridden open eyes does not follow any command possibly septic so admitted, and critical care consult was called. PMH:  has a past medical history of Diabetes mellitus ([address] HTN (hypertension), Hyperlipidemia, Neuropathy, PAD (peripheral artery disease) ([address] and Sciatica. PSH:   has a past surgical history that includes hx other surgical (Bilateral); hx amputation (Right); hx other surgical; hx cervical fusion; hx vascular stent (Bilateral); and hx vascular stent (Bilateral).      FHX: family history includes Cancer in her mother; Diabetes in her mother; Hypertension in her mother. SHX:  reports that she has never smoked. She has never used smokeless tobacco. She reports previous alcohol use. She reports that she does not use drugs.     ALL: No Known Allergies     MEDS:   [x] Reviewed - As Below   [] Not reviewed    Current Facility-Administered Medications   Medication    hydrALAZINE (APRESOLINE) tablet 50 mg    dextrose 5% infusion    [Held by provider] furosemide (LASIX) injection 40 mg    fluconazole (DIFLUCAN) 200mg/100 mL IVPB (premix)    VANCOMYCIN INFORMATION NOTE    insulin glargine (LANTUS) injection 22 Units    methylPREDNISolone (PF) (SOLU-MEDROL) injection 40 mg    And    insulin NPH (NOVOLIN N, HUMULIN N) injection 24 Units    labetaloL (NORMODYNE;TRANDATE) 20 mg/4 mL (5 mg/mL) injection 20 mg    hydrALAZINE (APRESOLINE) 20 mg/mL injection 20 mg    0.9% sodium chloride infusion 250 mL    [Held by provider] gabapentin (NEURONTIN) capsule 300 mg    0.9% sodium chloride infusion 250 mL    insulin lispro (HUMALOG) injection    atorvastatin (LIPITOR) tablet 20 mg    latanoprost (XALATAN) 0.005 % ophthalmic solution 1 Drop    aspirin delayed-release tablet 81 mg    brimonidine (ALPHAGAN) 0.2 % ophthalmic solution 1 Drop    glucose chewable tablet 16 g    glucagon (GLUCAGEN) injection 1 mg    acetaminophen (TYLENOL) suppository 650 mg    polyethylene glycol (MIRALAX) packet 17 g    ondansetron (ZOFRAN ODT) tablet 4 mg    Or    ondansetron (ZOFRAN) injection 4 mg    heparin (porcine) injection 5,000 Units    piperacillin-tazobactam (ZOSYN) 3.375 g in 0.9% sodium chloride (MBP/ADV) 100 mL MBP    albuterol-ipratropium (DUO-NEB) 2.5 MG-0.5 MG/3 ML    ferrous sulfate 300 mg (60 mg iron)/5 mL oral syrup 300 mg    acetaminophen (TYLENOL) solution 650 mg      MAR reviewed and pertinent medications noted or modified as needed   Current Facility-Administered Medications   Medication    hydrALAZINE (APRESOLINE) tablet 50 mg    dextrose 5% infusion    [Held by provider] furosemide (LASIX) injection 40 mg    fluconazole (DIFLUCAN) 200mg/100 mL IVPB (premix)    VANCOMYCIN INFORMATION NOTE    insulin glargine (LANTUS) injection 22 Units    methylPREDNISolone (PF) (SOLU-MEDROL) injection 40 mg    And    insulin NPH (NOVOLIN N, HUMULIN N) injection 24 Units    labetaloL (NORMODYNE;TRANDATE) 20 mg/4 mL (5 mg/mL) injection 20 mg    hydrALAZINE (APRESOLINE) 20 mg/mL injection 20 mg    0.9% sodium chloride infusion 250 mL    [Held by provider] gabapentin (NEURONTIN) capsule 300 mg    0.9% sodium chloride infusion 250 mL    insulin lispro (HUMALOG) injection    atorvastatin (LIPITOR) tablet 20 mg    latanoprost (XALATAN) 0.005 % ophthalmic solution 1 Drop    aspirin delayed-release tablet 81 mg    brimonidine (ALPHAGAN) 0.2 % ophthalmic solution 1 Drop    glucose chewable tablet 16 g    glucagon (GLUCAGEN) injection 1 mg    acetaminophen (TYLENOL) suppository 650 mg    polyethylene glycol (MIRALAX) packet 17 g    ondansetron (ZOFRAN ODT) tablet 4 mg    Or    ondansetron (ZOFRAN) injection 4 mg    heparin (porcine) injection 5,000 Units    piperacillin-tazobactam (ZOSYN) 3.375 g in 0.9% sodium chloride (MBP/ADV) 100 mL MBP    albuterol-ipratropium (DUO-NEB) 2.5 MG-0.5 MG/3 ML    ferrous sulfate 300 mg (60 mg iron)/5 mL oral syrup 300 mg    acetaminophen (TYLENOL) solution 650 mg      PMH:  has a past medical history of Diabetes mellitus ([address] HTN (hypertension), Hyperlipidemia, Neuropathy, PAD (peripheral artery disease) ([address] and Sciatica. PSH:   has a past surgical history that includes hx other surgical (Bilateral); hx amputation (Right); hx other surgical; hx cervical fusion; hx vascular stent (Bilateral); and hx vascular stent (Bilateral). FHX: family history includes Cancer in her mother; Diabetes in her mother; Hypertension in her mother. SHX:  reports that she has never smoked.  She has never used smokeless tobacco. She reports previous alcohol use. She reports that she does not use drugs. ROS:  Unable to obtain barely unresponsive    Hemodynamics:    CO:    CI:    CVP:    SVR:   PAP Systolic:    PAP Diastolic:    PVR:    HJ58:        Ventilator Settings:      Mode Rate TV Press PEEP FiO2 PIP Min. Vent                              Vital Signs: Telemetry:    normal sinus rhythm Intake/Output:   Visit Vitals  BP (!) 175/63 (BP 1 Location: Right upper arm, BP Patient Position: At rest)   Pulse 83   Temp 99.5 °F (37.5 °C)   Resp 22   Ht 5' 7.99\" (1.727 m)   Wt 86.9 kg (191 lb 9.3 oz)   SpO2 97%   BMI 29.14 kg/m²       Temp (24hrs), Av.9 °F (37.2 °C), Min:98.2 °F (36.8 °C), Max:99.5 °F (37.5 °C)        O2 Device: None (Room air) O2 Flow Rate (L/min): 0 l/min       Wt Readings from Last 4 Encounters:   04/10/22 86.9 kg (191 lb 9.3 oz)   22 82.2 kg (181 lb 3.5 oz)   20 84.4 kg (186 lb)   20 84.8 kg (187 lb)          Intake/Output Summary (Last 24 hours) at 4/10/2022 1021  Last data filed at 4/10/2022 0600  Gross per 24 hour   Intake --   Output 750 ml   Net -750 ml       Last shift:      No intake/output data recorded. Last 3 shifts:  1901 - 04/10 0700  In: -   Out: 1012 [Urine:875; Drains:600]       Physical Exam:     General: Open eyes but does not follow any command  HEENT: NCAT, poor dentition, lips and mucosa dry  Eyes: anicteric; conjunctiva clear  Neck: no nodes, trach midline; no accessory MM use. Chest: no deformity,   Cardiac: R regular; no murmur;   Lungs: distant breath sounds; no wheezes  Abd: soft, NT, hypoactive BS  Ext: no edema; no joint swelling;  No clubbing  : NO giraldo, clear urine  Neuro: She only moves her left arm  Psych- unable to assess  Skin: warm, dry, no cyanosis;   Pulses: 1-2+ Bilateral pedal, radial  Capillary: brisk; pale      DATA:    MAR reviewed and pertinent medications noted or modified as needed  MEDS:   Current Facility-Administered Medications Medication    hydrALAZINE (APRESOLINE) tablet 50 mg    dextrose 5% infusion    [Held by provider] furosemide (LASIX) injection 40 mg    fluconazole (DIFLUCAN) 200mg/100 mL IVPB (premix)    VANCOMYCIN INFORMATION NOTE    insulin glargine (LANTUS) injection 22 Units    methylPREDNISolone (PF) (SOLU-MEDROL) injection 40 mg    And    insulin NPH (NOVOLIN N, HUMULIN N) injection 24 Units    labetaloL (NORMODYNE;TRANDATE) 20 mg/4 mL (5 mg/mL) injection 20 mg    hydrALAZINE (APRESOLINE) 20 mg/mL injection 20 mg    0.9% sodium chloride infusion 250 mL    [Held by provider] gabapentin (NEURONTIN) capsule 300 mg    0.9% sodium chloride infusion 250 mL    insulin lispro (HUMALOG) injection    atorvastatin (LIPITOR) tablet 20 mg    latanoprost (XALATAN) 0.005 % ophthalmic solution 1 Drop    aspirin delayed-release tablet 81 mg    brimonidine (ALPHAGAN) 0.2 % ophthalmic solution 1 Drop    glucose chewable tablet 16 g    glucagon (GLUCAGEN) injection 1 mg    acetaminophen (TYLENOL) suppository 650 mg    polyethylene glycol (MIRALAX) packet 17 g    ondansetron (ZOFRAN ODT) tablet 4 mg    Or    ondansetron (ZOFRAN) injection 4 mg    heparin (porcine) injection 5,000 Units    piperacillin-tazobactam (ZOSYN) 3.375 g in 0.9% sodium chloride (MBP/ADV) 100 mL MBP    albuterol-ipratropium (DUO-NEB) 2.5 MG-0.5 MG/3 ML    ferrous sulfate 300 mg (60 mg iron)/5 mL oral syrup 300 mg    acetaminophen (TYLENOL) solution 650 mg        Labs:    Recent Labs     04/10/22  0338 04/09/22  0332 04/08/22  0620   WBC 26.8* 24.0* 17.6*   HGB 12.1 12.1 11.7   * 386 408*     Recent Labs     04/10/22  0338 04/09/22  0332 04/08/22  0620   * 155* 151*   K 4.7 4.3 5.2*   * 124* 119*   CO2 22 25 25   * 227* 416*   BUN 92* 59* 47*   CREA 1.87* 1.53* 1.41*   CA 8.7 9.0 8.7   ALB 1.8* 1.9* 1.8*   ALT 56 59 64     Recent Labs     04/09/22  0337 04/08/22  0715   PH 7.45 7.51*   PCO2 35 31*   PO2 81 65*   HCO3 25 26   FIO2 21 21.0     No results for input(s): CPK, CKNDX, TROIQ in the last 72 hours. No lab exists for component: CPKMB  No results found for: BNPP, BNP   Lab Results   Component Value Date/Time    Culture result: POSSIBLE Pseudomonas species 04/07/2022 02:00 PM    Culture result: No growth 5 days 04/03/2022 10:43 PM    Culture result: No growth 6 days 03/05/2022 10:16 AM     Lab Results   Component Value Date/Time    TSH 2.880 12/12/2016 10:13 AM        Imaging:    Results from [place] encounter on 04/03/22    XR CHEST PORT    Narrative  Chest one view. AP semiupright portable at 0228 dated 4/9/2022. Comparison 4/8/2022. The cardiomediastinal silhouette is stable. The pulmonary  blood flow is normal. The lungs are clear. The pleural surfaces are smooth. The  right hemidiaphragm is elevated. There is incompletely imaged cervical spine hardware. Impression  No significant change. Results from [place] encounter on 04/03/22    CT HEAD WO CONT    Narrative  PROCEDURE: CT HEAD WO CONT    HISTORY:Altered mental status    COMPARISON:Head CT 3 March 2022    Department policy stipulates all CT scans at this facility are performed using  dose reduction optimization techniques as appropriate to the performed exam,  including the following: Automated exposure control, adjustments of the mA  and/or KVP according to the patient size, and the use of iterative  reconstruction technique. TECHNIQUE: Without IV contrast    Bones/extracranial soft tissues:  Maxillary sinuses and right frontal sinus  remains nearly completely opacified. Extra-axial spaces:  No hemorrhage, mass or focal fluid collection. Ventricles/sulci:  There is mild dilatation of the ventricles. Sulci are  prominent. Brain parenchyma: An area of encephalomalacia in the left frontal lobe is  showing chronic evolution without evidence of hemorrhagic conversion. No other  focal lesions identified. No mass effect. There is good gray-white differentiation. There are  inhomogeneous areas of mildly decreased density in periventricular white matter. Impression  Chronic changes which appear stable. No acute or active intracranial process.   Chronic paranasal sinus disease

## 2022-04-10 NOTE — PROGRESS NOTES
Vancomycin Dosing Consult  Day #3 of vancomycin therapy  Consult ordered by Dr. Samantha Valdez for this 78 y.o. female for indication of sepsis. Antibiotic regimen: Vancomycin + fluconazole + zosyn    Temp (24hrs), Av.3 °F (37.4 °C), Min:99 °F (37.2 °C), Max:99.5 °F (37.5 °C)    Recent Labs     04/10/22  0338 22   WBC 26.8* 24.0* 17.6*   CREA 1.87* 1.53* 1.41*   BUN 92* 59* 47*     Recent Labs     222 22   CRP 11.50* 14.50*     Recent Labs     22   PCT 3.23* 3.37*       Estimated Creatinine Clearance: 30 mL/min (A) (based on SCr of 1.87 mg/dL (H)). ml/min  Concomitant nephrotoxic drugs: furosemide    Cultures:   4/3 blood: ngtd    urine: Pseudomonas  4/10 blood: pending      MRSA Swab: Not detected     Target range: Trough 10-15 mcg/mL    Recent level history:  Date/Time Dose & Interval Measured Level (mcg/mL) Associated AUC/GEMA   04/10/2022  03:38 AM      500mg IV  @ 10:52            24.5    2022 10:30 AM                21.5         Assessment/Plan:   Patient has RICARDO on CKD, WBC and Scr trending up. CRP and PCT decreased.   Will order 500mg IV x 1 at 17:00  Random vancomycin level  at 14:00  Antimicrobial stop date TBD

## 2022-04-10 NOTE — PROGRESS NOTES
Consult Date: 4/10/2022      Subjective   Pt is more awake today - off gabapentin   Sodium remains high but not any worse since yesterday. Solumedrol d/bruno - most likely causing high sugars and wbc count   B.P is running high as PEG is clogged and she didn't get B.P rx.     Past Medical History:   Diagnosis Date    Diabetes mellitus ([address]     HTN (hypertension)     Hyperlipidemia     Neuropathy     PAD (peripheral artery disease) (HCC)     PCI    Sciatica       Past Surgical History:   Procedure Laterality Date    HX AMPUTATION Right     great toe    HX CERVICAL FUSION      HX OTHER SURGICAL Bilateral     Stent placement    HX OTHER SURGICAL      HX VASCULAR STENT Bilateral     2 in right 1 in left    HX VASCULAR STENT Bilateral      Family History   Problem Relation Age of Onset    Cancer Mother     Diabetes Mother     Hypertension Mother       Social History     Tobacco Use    Smoking status: Never Smoker    Smokeless tobacco: Never Used   Substance Use Topics    Alcohol use: Not Currently       Current Facility-Administered Medications   Medication Dose Route Frequency Provider Last Rate Last Admin    [START ON 4/11/2022] gabapentin (NEURONTIN) capsule 300 mg  300 mg Oral QHS Israel Soto MD        amLODIPine (NORVASC) tablet 5 mg  5 mg Oral DAILY Israel Soto MD        metoprolol tartrate (LOPRESSOR) tablet 50 mg  50 mg Oral BID Israel Soto MD        hydrALAZINE (APRESOLINE) tablet 50 mg  50 mg Oral TID Israel Soto MD   50 mg at 04/10/22 0931    dextrose 5% infusion  75 mL/hr IntraVENous CONTINUOUS Kandy Berry MD 75 mL/hr at 04/09/22 2101 75 mL/hr at 04/09/22 2101    [Held by provider] furosemide (LASIX) injection 40 mg  40 mg IntraVENous DAILY Israel Soto MD   40 mg at 04/09/22 1054    fluconazole (DIFLUCAN) 200mg/100 mL IVPB (premix)  200 mg IntraVENous Q24H James Shah  mL/hr at 04/10/22 1157 200 mg at 04/10/22 1157    VANCOMYCIN INFORMATION NOTE   Other Rx Dosing/Monitoring Kevin Byers MD        insulin glargine (LANTUS) injection 22 Units  22 Units SubCUTAneous BID Israel Soto MD   22 Units at 04/10/22 0931    labetaloL (NORMODYNE;TRANDATE) 20 mg/4 mL (5 mg/mL) injection 20 mg  20 mg IntraVENous Q4H PRN Israel Soto MD   20 mg at 04/10/22 0510    hydrALAZINE (APRESOLINE) 20 mg/mL injection 20 mg  20 mg IntraVENous Q6H PRN Israel Soto MD   20 mg at 04/10/22 1157    0.9% sodium chloride infusion 250 mL  250 mL IntraVENous PRN James Crespo MD        0.9% sodium chloride infusion 250 mL  250 mL IntraVENous PRN Senait Soto MD       [place] insulin lispro (HUMALOG) injection   SubCUTAneous Q6H Senait Soto MD   18 Units at 04/10/22 1157    atorvastatin (LIPITOR) tablet 20 mg  20 mg Oral QHS Israel Soto MD   20 mg at 04/09/22 2116    latanoprost (XALATAN) 0.005 % ophthalmic solution 1 Drop  1 Drop Right Eye QPM Pebbles Morley MD   1 Drop at 04/09/22 1826    aspirin delayed-release tablet 81 mg  81 mg Oral DAILY Israel Soto MD   81 mg at 04/10/22 0931    brimonidine (ALPHAGAN) 0.2 % ophthalmic solution 1 Drop  1 Drop Both Eyes TID Pebbles Morley MD   1 Drop at 04/10/22 0931    glucose chewable tablet 16 g  4 Tablet Oral PRN Senait Soto MD        glucagon (GLUCAGEN) injection 1 mg  1 mg IntraMUSCular PRN Senait Soto MD        acetaminophen (TYLENOL) suppository 650 mg  650 mg Rectal Q6H PRN Senait Soto MD        polyethylene glycol (MIRALAX) packet 17 g  17 g Oral DAILY PRN Senait Soto MD        ondansetron (ZOFRAN ODT) tablet 4 mg  4 mg Oral Q8H PRN Senait Soto MD        Or    ondansetron [facility]) injection 4 mg  4 mg IntraVENous Q6H PRN Pebbles Morley MD        heparin (porcine) injection 5,000 Units  5,000 Units SubCUTAneous Arianna Hanna MD   5,000 Units at 04/10/22 0510    piperacillin-tazobactam (ZOSYN) 3.375 g in 0.9% sodium chloride (MBP/ADV) 100 mL MBP  3.375 g IntraVENous Q8H Israel Soto MD 25 mL/hr at 04/10/22 0932 3.375 g at 04/10/22 0932    albuterol-ipratropium (DUO-NEB) 2.5 MG-0.5 MG/3 ML  3 mL Nebulization Q6H PRN Rod Soto MD        ferrous sulfate 300 mg (60 mg iron)/5 mL oral syrup 300 mg  300 mg Oral BID Israel Soto MD   300 mg at 04/10/22 0930    acetaminophen (TYLENOL) solution 650 mg  650 mg Per NG tube Q6H PRN Rod Soto MD   650 mg at 04/09/22 2325        Review of Systems   Unable to perform ROS: Patient nonverbal       Objective     Vital signs for last 24 hours:  Visit Vitals  BP (!) 175/63 (BP 1 Location: Right upper arm, BP Patient Position: At rest)   Pulse 83   Temp 99.3 °F (37.4 °C)   Resp 22   Ht 5' 7.99\" (1.727 m)   Wt 86.9 kg (191 lb 9.3 oz)   SpO2 97%   BMI 29.14 kg/m²           Recent Results (from the past 24 hour(s))   GLUCOSE, POC    Collection Time: 04/09/22  6:03 PM   Result Value Ref Range    Glucose (POC) 218 (H) 65 - 117 mg/dL    Performed by [address] [place] [address] Dr Zelaya, POC    Collection Time: 04/09/22 10:49 PM   Result Value Ref Range    Glucose (POC) 262 (H) 65 - 117 mg/dL    Performed by Jatinder Haddad    CBC W/O DIFF    Collection Time: 04/10/22  3:38 AM   Result Value Ref Range    WBC 26.8 (H) 3.6 - 11.0 K/uL    RBC 4.34 3.80 - 5.20 M/uL    HGB 12.1 11.5 - 16.0 g/dL    HCT 40.4 35.0 - 47.0 %    MCV 93.1 80.0 - 99.0 FL    MCH 27.9 26.0 - 34.0 PG    MCHC 30.0 30.0 - 36.5 g/dL    RDW 17.4 (H) 11.5 - 14.5 %    PLATELET 692 (H) 377 - 400 K/uL    MPV 12.0 8.9 - 12.9 FL    NRBC 0.1 (H) 0.0  WBC    ABSOLUTE NRBC 0.04 (H) 0.00 - 9.79 K/uL   METABOLIC PANEL, COMPREHENSIVE    Collection Time: 04/10/22  3:38 AM   Result Value Ref Range    Sodium 155 (H) 136 - 145 mmol/L    Potassium 4.7 3.5 - 5.1 mmol/L    Chloride 123 (H) 97 - 108 mmol/L    CO2 22 21 - 32 mmol/L    Anion gap 10 5 - 15 mmol/L    Glucose 362 (H) 65 - 100 mg/dL    BUN 92 (H) 6 - 20 mg/dL    Creatinine 1.87 (H) 0.55 - 1.02 mg/dL    BUN/Creatinine ratio 49 (H) 12 - 20      GFR est AA 32 (L) >60 ml/min/1.73m2    GFR est non-AA 26 (L) >60 ml/min/1.73m2    Calcium 8.7 8.5 - 10.1 mg/dL    Bilirubin, total 0.4 0.2 - 1.0 mg/dL    AST (SGOT) 51 (H) 15 - 37 U/L    ALT (SGPT) 56 12 - 78 U/L    Alk. phosphatase 118 (H) 45 - 117 U/L    Protein, total 7.1 6.4 - 8.2 g/dL    Albumin 1.8 (L) 3.5 - 5.0 g/dL    Globulin 5.3 (H) 2.0 - 4.0 g/dL    A-G Ratio 0.3 (L) 1.1 - 2.2     VANCOMYCIN, RANDOM    Collection Time: 04/10/22  3:38 AM   Result Value Ref Range    Vancomycin, random 24.5 ug/mL   GLUCOSE, POC    Collection Time: 04/10/22  5:14 AM   Result Value Ref Range    Glucose (POC) 311 (H) 65 - 117 mg/dL    Performed by [address] [place] [address], POC    Collection Time: 04/10/22 11:39 AM   Result Value Ref Range    Glucose (POC) 358 (H) 65 - 117 mg/dL    Performed by Olesya KEENE           Intake/Output Summary (Last 24 hours) at 4/10/2022 1255  Last data filed at 4/10/2022 0600  Gross per 24 hour   Intake --   Output 750 ml   Net -750 ml      Current Shift: No intake/output data recorded. Last 3 Shifts: 04/08 1901 - 04/10 0700  In: -   Out: 7557 [Urine:875; Drains:600]  Physical Exam  Constitutional:       Appearance: She is ill-appearing. HENT:      Head: Normocephalic and atraumatic. Nose: Nose normal.   Cardiovascular:      Rate and Rhythm: Tachycardia present. Pulses: Normal pulses. Abdominal:      General: Abdomen is flat. Skin:     General: Skin is warm and dry.         Her face appears swollen  Gangrene of the big toe-no clear demarcation line  Data Review:   Recent Results (from the past 24 hour(s))   GLUCOSE, POC    Collection Time: 04/09/22  6:03 PM   Result Value Ref Range    Glucose (POC) 218 (H) 65 - 117 mg/dL    Performed by [address] [place] [address] Dr Zelaya, POC    Collection Time: 04/09/22 10:49 PM   Result Value Ref Range    Glucose (POC) 262 (H) 65 - 117 mg/dL    Performed by Angel Andrews    CBC W/O DIFF Collection Time: 04/10/22  3:38 AM   Result Value Ref Range    WBC 26.8 (H) 3.6 - 11.0 K/uL    RBC 4.34 3.80 - 5.20 M/uL    HGB 12.1 11.5 - 16.0 g/dL    HCT 40.4 35.0 - 47.0 %    MCV 93.1 80.0 - 99.0 FL    MCH 27.9 26.0 - 34.0 PG    MCHC 30.0 30.0 - 36.5 g/dL    RDW 17.4 (H) 11.5 - 14.5 %    PLATELET 884 (H) 544 - 400 K/uL    MPV 12.0 8.9 - 12.9 FL    NRBC 0.1 (H) 0.0  WBC    ABSOLUTE NRBC 0.04 (H) 0.00 - 1.96 K/uL   METABOLIC PANEL, COMPREHENSIVE    Collection Time: 04/10/22  3:38 AM   Result Value Ref Range    Sodium 155 (H) 136 - 145 mmol/L    Potassium 4.7 3.5 - 5.1 mmol/L    Chloride 123 (H) 97 - 108 mmol/L    CO2 22 21 - 32 mmol/L    Anion gap 10 5 - 15 mmol/L    Glucose 362 (H) 65 - 100 mg/dL    BUN 92 (H) 6 - 20 mg/dL    Creatinine 1.87 (H) 0.55 - 1.02 mg/dL    BUN/Creatinine ratio 49 (H) 12 - 20      GFR est AA 32 (L) >60 ml/min/1.73m2    GFR est non-AA 26 (L) >60 ml/min/1.73m2    Calcium 8.7 8.5 - 10.1 mg/dL    Bilirubin, total 0.4 0.2 - 1.0 mg/dL    AST (SGOT) 51 (H) 15 - 37 U/L    ALT (SGPT) 56 12 - 78 U/L    Alk. phosphatase 118 (H) 45 - 117 U/L    Protein, total 7.1 6.4 - 8.2 g/dL    Albumin 1.8 (L) 3.5 - 5.0 g/dL    Globulin 5.3 (H) 2.0 - 4.0 g/dL    A-G Ratio 0.3 (L) 1.1 - 2.2     VANCOMYCIN, RANDOM    Collection Time: 04/10/22  3:38 AM   Result Value Ref Range    Vancomycin, random 24.5 ug/mL   GLUCOSE, POC    Collection Time: 04/10/22  5:14 AM   Result Value Ref Range    Glucose (POC) 311 (H) 65 - 117 mg/dL    Performed by Ana Rosa Hoffman    GLUCOSE, POC    Collection Time: 04/10/22 11:39 AM   Result Value Ref Range    Glucose (POC) 358 (H) 65 - 117 mg/dL    Performed by Naa KEENE          XR CHEST PORT   Final Result   No significant change. XR CHEST PORT   Final Result      XR CHEST PORT   Final Result   No acute cardiopulmonary abnormality. .       XR CHEST PORT   Final Result      CT HEAD WO CONT   Final Result   Chronic changes which appear stable.     No acute or active intracranial process. Chronic paranasal sinus disease         XR CHEST PORT   Final Result   No acute findings. XR CHEST PORT    (Results Pending)        Patient Active Problem List   Diagnosis Code    HTN (hypertension) I10    Hyperlipidemia E78.5    Neuropathy G62.9    Sciatica M54.30    Diabetes mellitus with neurological manifestations, uncontrolled [id number]    Infection of nailbed of toe of left foot L03.032    PAD (peripheral artery disease) ([facility]) I73.9    Hypercalcemia E83.52    DM type 2 causing vascular disease ([facility] E11.59    Type 2 diabetes mellitus with nephropathy ([facility] E11.21    Acute osteomyelitis of ankle or foot ([facility] M86.179    Diabetic peripheral neuropathy ([facility] E11.42    Spinal stenosis in cervical region M48.02    Ischemia of both lower extremities I99.8    Pain in both lower legs M79.661, M79.662    CVA (cerebral vascular accident) ([facility] I63.9    Elevated troponin R77.8    Hyperkalemia E87.5    UTI (urinary tract infection) N39.0    Sepsis ([facility]) A41.9    RICARDO (acute kidney injury) ([facility] N17.9    AMS (altered mental status) R41.82        DIAGNOSES:  1. Hypernatremia- with sodium 155.   2. UTI/sepsis on Zosyn and vancomycin  3. Gangrene of left great toe  4. Acute kidney injury on chronic kidney disease  5. Encephalopathy  6. Acute on chronic anemia  7. History of CVA  8. Diabetes mellitus with complications  9.  Peripheral arterial disease  DISCUSSION:  Dextrose 5 % at 75 ml/hr   Control hyperglycemia adjusting insulin  Add NTP 1 \" to CW q 6 hrs   C/w Hydralazine I.v prn to maintain B.P <140   Need free water 100 ml q 4 hrs when PEG clogged       Thank you

## 2022-04-10 NOTE — PROGRESS NOTES
PROGRESS NOTE    Patient: Julio Alves MRN: [id number]  SSN: [id number]    YOB: 1945  Age: 78 y.o. Sex: female      Admit Date: 4/3/2022    LOS: 6 days       Subjective     Chief Complaint   Patient presents with    Altered mental status       HPI: Patient is a 78y.o. 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 04/05   Patient is seen at bedside with daughter and nephew today. Patient is in distress due to pain and daughter notes that patient gets Neurontin TID for neuropathy daily. Otherwise, patient is still receiving enteric feeding through PEG tube and getting IVF. Patient was seen by nephrology yesterday. Recommends obtaining renal panel and continuing IVF. Patient was seen by ID yesterday. Recommends continuing IV vancomycin for urosepsis. Patient was seen by pulmonology today. Recommends PRBC transfusion. Labs indicate WBC 16.9, Hgb 6.3, Na 148, Cl 120, BUN 72, Cr 1.71, Ca 8, CRP 28.4, pro-carrie 6.93 and .       04/06   Patient is seen at bedside. Patient received 2 units of PRBC. Patient is on Zosyn. No new changes today. Patient seen by the vascular surgeon he recommend great toe amputation  And for sacral wound recommend Medihoney ointment     Labs show increasing Hgb of 9, decreasing WBC 14.7, Na 147, , BUN 55,   Cr 1.47, and CRP 21.4. CXR is unremarkable. 04/07  Patient is seen resting at bedside.  Patient received one unit of PRBC with increased Hgb of 10.6. No acute changes today. Patient is seen by nephrology who recommends free water flushes to correct hypernatremia. Labs show increasing Hgb of 10.6, WBC 15.1, pro-calcitonin 3.78, and CRP 16.1.    4/8    Patient had a rapid response this morning ABG and chest x-ray was ordered  Patient tachycardic tachypneic  ABG  pH 7.51 PCO2 31 PO2 65 bicarb 26 oxygen saturation 95% on room air    Chest x-ray shows no much changes    4/9    Patient resting the bed open eyes  On room air/breathing through the mouth    4/10    Patient open eyes not in distress breathing through her mouth  According to the nurse patient not sleep all day and all night yesterday      ROS  Unable to obtain. Objective     Visit Vitals  BP (!) 175/63 (BP 1 Location: Right upper arm, BP Patient Position: At rest)   Pulse 83   Temp 99.5 °F (37.5 °C)   Resp 22   Ht 5' 7.99\" (1.727 m)   Wt 86.9 kg (191 lb 9.3 oz)   SpO2 97%   BMI 29.14 kg/m²    O2 Flow Rate (L/min): 0 l/min O2 Device: None (Room air)    Physical Exam:   Physical Exam  Constitutional: Open eyes do not follow any commands. Tracks with eyes. HENT:   Head: Normocephalic and atraumatic. Eyes: Pupils are equal, round, and reactive to light. EOM are normal.   Cardiovascular: Normal rate, regular rhythm and normal heart sounds. Pulmonary/Chest: Decreased breath sound   abdominal: Soft. Bowel sounds are normal. There is no abdominal tenderness. There is no rebound and no guarding. Musculoskeletal: Normal range of motion. Neurological: Open eyes do not follow any, right-sided weakness  Intake & Output:  Current Shift: No intake/output data recorded. Last three shifts: 04/08 1901 - 04/10 0700  In: -   Out: 1475 [Urine:875; Drains:600]    Lab/Data Review: All lab results for the last 24 hours reviewed.        24 Hour Results:    Recent Results (from the past 24 hour(s))   GLUCOSE, POC    Collection Time: 04/09/22 11:33 AM   Result Value Ref Range    Glucose (POC) 216 (H) 65 - 117 mg/dL    Performed by Jorden Grajeda    GLUCOSE, POC    Collection Time: 04/09/22  6:03 PM   Result Value Ref Range    Glucose (POC) 218 (H) 65 - 117 mg/dL    Performed by [address] [place] [address] Dr Zelaya, POC    Collection Time: 04/09/22 10:49 PM   Result Value Ref Range    Glucose (POC) 262 (H) 65 - 117 mg/dL    Performed by Sheridan Wilburn    CBC W/O DIFF    Collection Time: 04/10/22  3:38 AM   Result Value Ref Range    WBC 26.8 (H) 3.6 - 11.0 K/uL    RBC 4.34 3.80 - 5.20 M/uL    HGB 12.1 11.5 - 16.0 g/dL    HCT 40.4 35.0 - 47.0 %    MCV 93.1 80.0 - 99.0 FL    MCH 27.9 26.0 - 34.0 PG    MCHC 30.0 30.0 - 36.5 g/dL    RDW 17.4 (H) 11.5 - 14.5 %    PLATELET 564 (H) 378 - 400 K/uL    MPV 12.0 8.9 - 12.9 FL    NRBC 0.1 (H) 0.0  WBC    ABSOLUTE NRBC 0.04 (H) 0.00 - 9.03 K/uL   METABOLIC PANEL, COMPREHENSIVE    Collection Time: 04/10/22  3:38 AM   Result Value Ref Range    Sodium 155 (H) 136 - 145 mmol/L    Potassium 4.7 3.5 - 5.1 mmol/L    Chloride 123 (H) 97 - 108 mmol/L    CO2 22 21 - 32 mmol/L    Anion gap 10 5 - 15 mmol/L    Glucose 362 (H) 65 - 100 mg/dL    BUN 92 (H) 6 - 20 mg/dL    Creatinine 1.87 (H) 0.55 - 1.02 mg/dL    BUN/Creatinine ratio 49 (H) 12 - 20      GFR est AA 32 (L) >60 ml/min/1.73m2    GFR est non-AA 26 (L) >60 ml/min/1.73m2    Calcium 8.7 8.5 - 10.1 mg/dL    Bilirubin, total 0.4 0.2 - 1.0 mg/dL    AST (SGOT) 51 (H) 15 - 37 U/L    ALT (SGPT) 56 12 - 78 U/L    Alk.  phosphatase 118 (H) 45 - 117 U/L    Protein, total 7.1 6.4 - 8.2 g/dL    Albumin 1.8 (L) 3.5 - 5.0 g/dL    Globulin 5.3 (H) 2.0 - 4.0 g/dL    A-G Ratio 0.3 (L) 1.1 - 2.2     VANCOMYCIN, RANDOM    Collection Time: 04/10/22  3:38 AM   Result Value Ref Range    Vancomycin, random 24.5 ug/mL   GLUCOSE, POC    Collection Time: 04/10/22  5:14 AM   Result Value Ref Range    Glucose (POC) 311 (H) 65 - 117 mg/dL    Performed by Katie Ontiveros          Imaging:    XR CHEST PORT   Final Result   No significant change. XR CHEST PORT   Final Result      XR CHEST PORT   Final Result   No acute cardiopulmonary abnormality. .       XR CHEST PORT   Final Result      CT HEAD WO CONT   Final Result   Chronic changes which appear stable. No acute or active intracranial process. Chronic paranasal sinus disease         XR CHEST PORT   Final Result   No acute findings. XR CHEST PORT    (Results Pending)          Assessment   Severe sepsis with UTI  UTI  Acute on chronic kidney disease  Hyperkalemia  Hypernatremia  CVA with right-sided weakness  Anemia of chronic disease  Gangrene of the left great toe  Sacral decubitus ulcer  Acute respiratory failure  Possible aspiration  Elevated BNP    Plan   Transfer the patient to ICU  Lasix 40 mg IV daily  Hypernatremia follow-up with nephrology  Hydralazine 50 mg 3 times a day  IV Zosyn   sliding scale insulin  Aspirin 81 mg daily  Norvasc 5 mg daily  Metoprolol 50 twice daily  Lipitor 20 mg daily  Alphagan 0.2% 3 times a day  Ferrous sulfate 325 mg twice a day  Heparin 5000 units subcu every 8 hours  Half-normal saline at 100 cc/h  Duo-neb 2.5 mg-0.5 mg PRN  Daily renal panel per nephrology recommendation. Full code. Start on half normal saline IVF.   Pulmonary nephrology infectious disease and vascular surgical.  Follow-up with the vascular surgeon regarding amputation of the left great toe    Start PEG tube feeding with water flush   Start back on gabapentin at night only    Blood sugars running high secondary D5 at 75 cc/h as per nephrology  Transfer telemetry floor      Current Facility-Administered Medications:     hydrALAZINE (APRESOLINE) tablet 50 mg, 50 mg, Oral, TID, Israel Soto MD, 50 mg at 04/10/22 0931    dextrose 5% infusion, 75 mL/hr, IntraVENous, CONTINUOUS, Kandy Berry MD, Last Rate: 75 mL/hr at 04/09/22 2101, 75 mL/hr at 04/09/22 2101    [Held by provider] furosemide (LASIX) injection 40 mg, 40 mg, IntraVENous, DAILY, Brittany Leticia Escalante MD, 40 mg at 04/09/22 1054    fluconazole (DIFLUCAN) 200mg/100 mL IVPB (premix), 200 mg, IntraVENous, Q24H, Ruslan Gregory MD, Last Rate: 100 mL/hr at 04/09/22 1053, 200 mg at 04/09/22 1053    VANCOMYCIN INFORMATION NOTE, , Other, Rx Dosing/Monitoring, Ruslan Gregory MD    insulin glargine (LANTUS) injection 22 Units, 22 Units, SubCUTAneous, BID, Israel Soto MD, 22 Units at 04/10/22 0931    labetaloL (NORMODYNE;TRANDATE) 20 mg/4 mL (5 mg/mL) injection 20 mg, 20 mg, IntraVENous, Q4H PRN, Kehinde Schaefer MD, 20 mg at 04/10/22 0510    hydrALAZINE (APRESOLINE) 20 mg/mL injection 20 mg, 20 mg, IntraVENous, Q6H PRN, Kehinde Schaefer MD, 20 mg at 04/10/22 6254    0.9% sodium chloride infusion 250 mL, 250 mL, IntraVENous, PRN, Shiv Crespo MD  [facility] by provider] gabapentin (NEURONTIN) capsule 300 mg, 300 mg, Oral, TID, Kehinde Schaefer MD, 300 mg at 04/09/22 1054    0.9% sodium chloride infusion 250 mL, 250 mL, IntraVENous, PRN, Israel Soto MD    insulin lispro (HUMALOG) injection, , SubCUTAneous, Q6H, Israel Soto MD, 10 Units at 04/10/22 0519    atorvastatin (LIPITOR) tablet 20 mg, 20 mg, Oral, QHS, Israel Soto MD, 20 mg at 04/09/22 2116    latanoprost (XALATAN) 0.005 % ophthalmic solution 1 Drop, 1 Drop, Right Eye, QPM, Kehinde Schaefer MD, 1 Drop at 04/09/22 1826    aspirin delayed-release tablet 81 mg, 81 mg, Oral, DAILY, Israel Soto MD, 81 mg at 04/10/22 0931    brimonidine (ALPHAGAN) 0.2 % ophthalmic solution 1 Drop, 1 Drop, Both Eyes, TID, Kehinde Schaefer MD, 1 Drop at 04/10/22 0931    glucose chewable tablet 16 g, 4 Tablet, Oral, PRN, Israel Soto MD    glucagon (GLUCAGEN) injection 1 mg, 1 mg, IntraMUSCular, PRN, MD Richard Conley  [DISCONTINUED] acetaminophen (TYLENOL) tablet 650 mg, 650 mg, Oral, Q6H PRN, 650 mg at 04/04/22 8046 **OR** acetaminophen (TYLENOL) suppository 650 mg, 650 mg, Rectal, Q6H PRN, MD Richard Conley polyethylene glycol (MIRALAX) packet 17 g, 17 g, Oral, DAILY PRN, Ludy Soto MD    ondansetron (ZOFRAN ODT) tablet 4 mg, 4 mg, Oral, Q8H PRN **OR** ondansetron (ZOFRAN) injection 4 mg, 4 mg, IntraVENous, Q6H PRN, Israel Soto MD    heparin (porcine) injection 5,000 Units, 5,000 Units, SubCUTAneous, Q8H, Israel Soto MD, 5,000 Units at 04/10/22 0510    piperacillin-tazobactam (ZOSYN) 3.375 g in 0.9% sodium chloride (MBP/ADV) 100 mL MBP, 3.375 g, IntraVENous, Q8H, Israel Soto MD, Last Rate: 25 mL/hr at 04/10/22 0932, 3.375 g at 04/10/22 0932    albuterol-ipratropium (DUO-NEB) 2.5 MG-0.5 MG/3 ML, 3 mL, Nebulization, Q6H PRN, Ludy Soto MD    ferrous sulfate 300 mg (60 mg iron)/5 mL oral syrup 300 mg, 300 mg, Oral, BID, Ludy Soto MD, 300 mg at 04/10/22 0930    acetaminophen (TYLENOL) solution 650 mg, 650 mg, Per NG tube, Q6H PRN, Israel Soto MD, 650 mg at 04/09/22 2327    Current Outpatient Medications   Medication Instructions    acidophilus-pectin, citrus 25 million cell -100 mg tab tablet 2 Tablets, Oral, DAILY    amLODIPine (NORVASC) 5 mg, Oral, DAILY    artificial saliva (MOUTH KOTE) spra 1 Spray, Oral, 3 TIMES DAILY    aspirin delayed-release 81 mg tablet Oral, DAILY    atorvastatin (LIPITOR) 20 mg tablet TAKE 1 TABLET BY MOUTH EVERY NIGHT    brimonidine (ALPHAGAN) 0.2 % ophthalmic solution 1 Drop, Both Eyes, 3 TIMES DAILY    ferrous sulfate 325 mg, Oral, 2 TIMES DAILY    gabapentin (NEURONTIN) 300 mg, Oral, 2 TIMES DAILY    insulin glargine (LANTUS) 50 Units, SubCUTAneous, DAILY    latanoprost (XALATAN) 0.005 % ophthalmic solution 1 Drop, Right Eye, EVERY EVENING    lidocaine (XYLOCAINE) 4 % (40 mg/mL) topical solution 1 mL, Topical, AS NEEDED    metoprolol succinate (TOPROL-XL) 50 mg, Oral, 2 TIMES DAILY         Signed By: Laurent Carcamo MD     April 10, 2022

## 2022-04-10 NOTE — PROGRESS NOTES
Vancomycin Dosing Consult  Day #2 of vancomycin therapy  Consult ordered by Dr. Gricel Aguayo for this 78 y.o. female for indication of sepsis. Antibiotic regimen: Vancomycin + fluconazole + zosyn    Temp (24hrs), Av.4 °F (36.9 °C), Min:97.7 °F (36.5 °C), Max:99 °F (37.2 °C)    Recent Labs     22   WBC 24.0* 17.6* 15.1*   CREA 1.53* 1.41*  --    BUN 59* 47*  --      Recent Labs     22  0814   CRP 11.50* 14.50* 16.10*     Recent Labs     22   PCT 3.23* 3.37* 3.78*       Estimated Creatinine Clearance: 36.6 mL/min (A) (based on SCr of 1.53 mg/dL (H)). ml/min  Concomitant nephrotoxic drugs: Zosyn    Cultures:   4/3 blood: ngtd    urine: Pseudomonas      MRSA Swab: Not detected     Target range: Trough 10-15 mcg/mL    Recent level history:  Date/Time Dose & Interval Measured Level (mcg/mL) Associated AUC/GEMA   2022 10:30 AM     21.5         Assessment/Plan:   Patient has RICARDO on CKD. Hold Vancomycin dose today. Pharmacy will order a random level tomorrow.   Antimicrobial stop date TBD

## 2022-04-10 NOTE — PROGRESS NOTES
Infectious Disease Progress Note           Subjective:   Pt seen and examined at bedside. Remains in the ICU for close monitoring, not requiring pressor support. Solumedrol has been discontinued. More alert but non-verbal, no acute events since last seen.  Plans for transfer to the floors per ICU staff    Objective:   Physical Exam:     Visit Vitals  BP (!) 195/65   Pulse 90   Temp 99.3 °F (37.4 °C)   Resp 22   Ht 5' 8\" (1.727 m)   Wt 191 lb (86.6 kg)   SpO2 97%   BMI 29.04 kg/m²    O2 Flow Rate (L/min): 0 l/min O2 Device: None (Room air)    Temp (24hrs), Av.3 °F (37.4 °C), Min:99 °F (37.2 °C), Max:99.5 °F (37.5 °C)    04/10 0701 - 04/10 1900  In: -   Out: 550 [Urine:550]   1901 - 04/10 07  In: -   Out: 7181 [Urine:875; Drains:600]    General: NAD, alert, not following commands   HEENT: GUMARO, mouth breather, dry mucosa   Lungs: decreased at the bases, no wheeze/rhonchi   Heart: S1S2+, RRR, no murmur  Abdo: Peg tube in place, ND, +BS   : + indwelling giraldo cath   Exts: Gangrenous left great toe, no leg edema   Skin: Unstageable sacral decubitus ulcer, + flexiseal, no exposed bone     Data Review:       Recent Days:  Recent Labs     04/10/22  0338 04/09/22  0332 04/08/22  0620   WBC 26.8* 24.0* 17.6*   HGB 12.1 12.1 11.7   HCT 40.4 39.2 37.9   * 386 408*     Recent Labs     04/10/22  0338 04/09/22  03322  0620   BUN 92* 59* 47*   CREA 1.87* 1.53* 1.41*       Lab Results   Component Value Date/Time    C-Reactive protein 11.50 (H) 2022 03:32 AM          Microbiology     Results     Procedure Component Value Units Date/Time    CULTURE, BLOOD, PAIRED [[id number]] Collected: 04/10/22 0800    Order Status: Completed Specimen: Blood Updated: 04/10/22 0836    CULTURE, BLOOD, PAIRED [[id number]]     Order Status: Canceled Specimen: Blood     CULTURE, RESPIRATORY/SPUTUM/BRONCH Louise Quince STAIN [[id number]]     Order Status: Sent Specimen: Sputum     CULTURE, URINE [[id number]] (Abnormal)  (Susceptibility) Collected: 04/07/22 1400    Order Status: Completed Specimen: Urine Updated: 04/10/22 1217     Special Requests: No Special Requests        Dyess Count --        >100,000  colonies/ml       Culture result:       Pseudomonas aeruginosa PIP/JUJU Sensitivity to follow          Susceptibility      Pseudomonas aeruginosa     GEMA (Preliminary)     Amikacin ($) Susceptible     Cefepime ($$) Intermediate     Ceftazidime ($) Intermediate     Ciprofloxacin ($) Susceptible     Gentamicin ($) Susceptible     Levofloxacin ($) Susceptible     Meropenem ($$) Susceptible     Tobramycin ($) Susceptible                  Linear View                   CULTURE, BLOOD #1 [[id number]]     Order Status: Canceled Specimen: Blood     CULTURE, BLOOD #2 [[id number]]     Order Status: Canceled Specimen: Blood     MRSA SCREEN - PCR (NASAL) [[id number]] Collected: 04/04/22 0345    Order Status: Completed Specimen: Swab Updated: 04/04/22 0613     MRSA by PCR, Nasal Not Detected       COVID-19 RAPID TEST [[id number]] Collected: 04/03/22 2324    Order Status: Completed Specimen: Nasopharyngeal Updated: 04/04/22 0027     COVID-19 rapid test Not Detected        Comment: Rapid Abbott ID Now   Rapid NAAT:  The specimen is NEGATIVE for SARS-CoV-2, the novel coronavirus associated with COVID-19. Negative results should be treated as presumptive and, if inconsistent with clinical signs and symptoms or necessary for patient management, should be tested with an alternative molecular assay. Negative results do not preclude SARS-CoV-2 infection and should not be used as the sole basis for patient management decisions. This test has been authorized by the FDA under   an Emergency Use Authorization (EUA) for use by authorized laboratories.  Fact sheet for Healthcare Providers: ConventionUpdate.co.nz Fact sheet for Patients: ConventionUpdate.co.nz   Methodology: Isothermal Nucleic Acid Amplification         CULTURE, BLOOD, PAIRED [[id number]] Collected: 04/03/22 2243    Order Status: Completed Specimen: Blood Updated: 04/09/22 0853     Special Requests: No Special Requests        Culture result: No growth 5 days       CULTURE, URINE [[id number]] Collected: 04/03/22 2243    Order Status: Canceled Specimen: Urine              Diagnostics   CXR Results  (Last 48 hours)               04/09/22 0225  XR CHEST PORT Final result    Impression:  No significant change. Narrative:  Chest one view. AP semiupright portable at 0228 dated 4/9/2022. Comparison 4/8/2022. The cardiomediastinal silhouette is stable. The pulmonary   blood flow is normal. The lungs are clear. The pleural surfaces are smooth. The   right hemidiaphragm is elevated. There is incompletely imaged cervical spine hardware. Assessment/Plan     1. Sepsis due to UTI, w isolation of P. Aeruginosa from urine Cx, suspected aspiration PNA       Remains afebrile, rising WBC due to steroid therapy which has been discontinued      Afebrile, off pressor support. Susceptibility of P. aeruginosa to Zosyn yet to be reported       Will leave on current antimicrobials, Fluconazole, Zosyn and Vanc since clinically stable       Following pending Blood Cx and final Urine Cx. Routine labs in AM (04/11)     2. UTI, recurrent, underlying neurogenic bladder, + indwelling giraldo       P. Aeruginosa pending susceptibility to Zosyn isolated from urine      Will leave on Zosyn since no worsening in clinical status      3. Unstageable sacral ulcer, necrotic tissue, minimal drainage       Will monitor for superinfection, continue frequent turns     4. PAD stable dry gangrenous changed involving left great toe     5. Acute hypoxia, required Ventmask, remains on RA       Clear lungs on exam, IV steroid discontinued by Pulmonary     6.  Loose stools likely from antimicrobial therapy: Flexiseal in place        [employer], MD    4/10/2022

## 2022-04-11 NOTE — PROGRESS NOTES
IMPRESSION:   1. Sepsis  2. Aspiration pneumonia  3. Urinary tract infection  4. Acute kidney injury  5. Hyperkalemia resolved  6. History of CVA with right-sided weakness  7. Anemia  8. Hypernatremia  9. Hyperkalemia resolved  10. Gangrene of the left great toe sacral decubiti ulcer      RECOMMENDATIONS/PLAN:   1. She is on Zosyn   2. Patient is out of ICU mouth open heart breathing saturation 96% on room air  3. She is on Lasix   4. ABG acceptable and chest x-ray no acute infiltrate  5. Chest x-ray no acute infiltrate  6. WBC elevated most likely secondary to steroids  7. Recieve packed RBC today's Hb 10.5   Time spent patient care 30 minutes  [x] High complexity decision making was performed  [x] See my orders for details  HPI  79-year-old lady nursing home resident came in as she was found confused with altered mental status family noted that she was not acting properly she is in a nursing home unable to get any history to the patient she has significant past medical history of CVA right-sided weakness hypertension diabetes mellitus she is bedridden open eyes does not follow any command possibly septic so admitted, and critical care consult was called. PMH:  has a past medical history of Diabetes mellitus ([address] HTN (hypertension), Hyperlipidemia, Neuropathy, PAD (peripheral artery disease) ([address] and Sciatica. PSH:   has a past surgical history that includes hx other surgical (Bilateral); hx amputation (Right); hx other surgical; hx cervical fusion; hx vascular stent (Bilateral); and hx vascular stent (Bilateral). FHX: family history includes Cancer in her mother; Diabetes in her mother; Hypertension in her mother. SHX:  reports that she has never smoked. She has never used smokeless tobacco. She reports previous alcohol use. She reports that she does not use drugs.     ALL: No Known Allergies     MEDS:   [x] Reviewed - As Below   [] Not reviewed    Current Facility-Administered Medications Medication    gabapentin (NEURONTIN) capsule 300 mg    amLODIPine (NORVASC) tablet 5 mg    metoprolol tartrate (LOPRESSOR) tablet 50 mg    nitroglycerin (NITROBID) 2 % ointment 1 Inch    Vancomycin random level 4/11 at 14:00    hydrALAZINE (APRESOLINE) tablet 50 mg    dextrose 5% infusion    [Held by provider] furosemide (LASIX) injection 40 mg    fluconazole (DIFLUCAN) 200mg/100 mL IVPB (premix)    VANCOMYCIN INFORMATION NOTE    insulin glargine (LANTUS) injection 22 Units    labetaloL (NORMODYNE;TRANDATE) 20 mg/4 mL (5 mg/mL) injection 20 mg    hydrALAZINE (APRESOLINE) 20 mg/mL injection 20 mg    0.9% sodium chloride infusion 250 mL    0.9% sodium chloride infusion 250 mL    insulin lispro (HUMALOG) injection    atorvastatin (LIPITOR) tablet 20 mg    latanoprost (XALATAN) 0.005 % ophthalmic solution 1 Drop    aspirin delayed-release tablet 81 mg    brimonidine (ALPHAGAN) 0.2 % ophthalmic solution 1 Drop    glucose chewable tablet 16 g    glucagon (GLUCAGEN) injection 1 mg    acetaminophen (TYLENOL) suppository 650 mg    polyethylene glycol (MIRALAX) packet 17 g    ondansetron (ZOFRAN ODT) tablet 4 mg    Or    ondansetron (ZOFRAN) injection 4 mg    heparin (porcine) injection 5,000 Units    piperacillin-tazobactam (ZOSYN) 3.375 g in 0.9% sodium chloride (MBP/ADV) 100 mL MBP    albuterol-ipratropium (DUO-NEB) 2.5 MG-0.5 MG/3 ML    ferrous sulfate 300 mg (60 mg iron)/5 mL oral syrup 300 mg    acetaminophen (TYLENOL) solution 650 mg      MAR reviewed and pertinent medications noted or modified as needed   Current Facility-Administered Medications   Medication    gabapentin (NEURONTIN) capsule 300 mg    amLODIPine (NORVASC) tablet 5 mg    metoprolol tartrate (LOPRESSOR) tablet 50 mg    nitroglycerin (NITROBID) 2 % ointment 1 Inch    Vancomycin random level 4/11 at 14:00    hydrALAZINE (APRESOLINE) tablet 50 mg    dextrose 5% infusion    [Held by provider] furosemide (LASIX) injection 40 mg    fluconazole (DIFLUCAN) 200mg/100 mL IVPB (premix)    VANCOMYCIN INFORMATION NOTE    insulin glargine (LANTUS) injection 22 Units    labetaloL (NORMODYNE;TRANDATE) 20 mg/4 mL (5 mg/mL) injection 20 mg    hydrALAZINE (APRESOLINE) 20 mg/mL injection 20 mg    0.9% sodium chloride infusion 250 mL    0.9% sodium chloride infusion 250 mL    insulin lispro (HUMALOG) injection    atorvastatin (LIPITOR) tablet 20 mg    latanoprost (XALATAN) 0.005 % ophthalmic solution 1 Drop    aspirin delayed-release tablet 81 mg    brimonidine (ALPHAGAN) 0.2 % ophthalmic solution 1 Drop    glucose chewable tablet 16 g    glucagon (GLUCAGEN) injection 1 mg    acetaminophen (TYLENOL) suppository 650 mg    polyethylene glycol (MIRALAX) packet 17 g    ondansetron (ZOFRAN ODT) tablet 4 mg    Or    ondansetron (ZOFRAN) injection 4 mg    heparin (porcine) injection 5,000 Units    piperacillin-tazobactam (ZOSYN) 3.375 g in 0.9% sodium chloride (MBP/ADV) 100 mL MBP    albuterol-ipratropium (DUO-NEB) 2.5 MG-0.5 MG/3 ML    ferrous sulfate 300 mg (60 mg iron)/5 mL oral syrup 300 mg    acetaminophen (TYLENOL) solution 650 mg      PMH:  has a past medical history of Diabetes mellitus ([facility] [address] HTN (hypertension), Hyperlipidemia, Neuropathy, PAD (peripheral artery disease) ([facility] [address] and Sciatica. PSH:   has a past surgical history that includes hx other surgical (Bilateral); hx amputation (Right); hx other surgical; hx cervical fusion; hx vascular stent (Bilateral); and hx vascular stent (Bilateral). FHX: family history includes Cancer in her mother; Diabetes in her mother; Hypertension in her mother. SHX:  reports that she has never smoked. She has never used smokeless tobacco. She reports previous alcohol use. She reports that she does not use drugs.      ROS:  Unable to obtain barely unresponsive    Hemodynamics:    CO:    CI:    CVP:    SVR:   PAP Systolic:    PAP Diastolic:    PVR:    JB67:        Ventilator Settings:      Mode Rate TV Press PEEP FiO2 PIP Min. Vent                              Vital Signs: Telemetry:    normal sinus rhythm Intake/Output:   Visit Vitals  BP (!) 155/68   Pulse 72   Temp 97.5 °F (36.4 °C)   Resp 20   Ht 5' 8\" (1.727 m)   Wt 86.6 kg (191 lb)   SpO2 98%   BMI 29.04 kg/m²       Temp (24hrs), Av.3 °F (36.8 °C), Min:97.3 °F (36.3 °C), Max:99.3 °F (37.4 °C)        O2 Device: None (Room air) O2 Flow Rate (L/min): 0 l/min       Wt Readings from Last 4 Encounters:   04/10/22 86.6 kg (191 lb)   22 82.2 kg (181 lb 3.5 oz)   20 84.4 kg (186 lb)   20 84.8 kg (187 lb)          Intake/Output Summary (Last 24 hours) at 2022 1014  Last data filed at 2022 0400  Gross per 24 hour   Intake --   Output 1025 ml   Net -1025 ml       Last shift:      No intake/output data recorded. Last 3 shifts:  1901 -  0700  In: -   Out: 1225 [Urine:1025; Drains:200]       Physical Exam:     General: Open eyes but does not follow any command  HEENT: NCAT, poor dentition, lips and mucosa dry  Eyes: anicteric; conjunctiva clear  Neck: no nodes, trach midline; no accessory MM use. Chest: no deformity,   Cardiac: R regular; no murmur;   Lungs: distant breath sounds; no wheezes  Abd: soft, NT, hypoactive BS  Ext: no edema; no joint swelling;  No clubbing  : NO giraldo, clear urine  Neuro: She only moves her left arm  Psych- unable to assess  Skin: warm, dry, no cyanosis;   Pulses: 1-2+ Bilateral pedal, radial  Capillary: brisk; pale      DATA:    MAR reviewed and pertinent medications noted or modified as needed  MEDS:   Current Facility-Administered Medications   Medication    gabapentin (NEURONTIN) capsule 300 mg    amLODIPine (NORVASC) tablet 5 mg    metoprolol tartrate (LOPRESSOR) tablet 50 mg    nitroglycerin (NITROBID) 2 % ointment 1 Inch    Vancomycin random level  at 14:00    hydrALAZINE (APRESOLINE) tablet 50 mg    dextrose 5% infusion    [Held by provider] furosemide (LASIX) injection 40 mg    fluconazole (DIFLUCAN) 200mg/100 mL IVPB (premix)    VANCOMYCIN INFORMATION NOTE    insulin glargine (LANTUS) injection 22 Units    labetaloL (NORMODYNE;TRANDATE) 20 mg/4 mL (5 mg/mL) injection 20 mg    hydrALAZINE (APRESOLINE) 20 mg/mL injection 20 mg    0.9% sodium chloride infusion 250 mL    0.9% sodium chloride infusion 250 mL    insulin lispro (HUMALOG) injection    atorvastatin (LIPITOR) tablet 20 mg    latanoprost (XALATAN) 0.005 % ophthalmic solution 1 Drop    aspirin delayed-release tablet 81 mg    brimonidine (ALPHAGAN) 0.2 % ophthalmic solution 1 Drop    glucose chewable tablet 16 g    glucagon (GLUCAGEN) injection 1 mg    acetaminophen (TYLENOL) suppository 650 mg    polyethylene glycol (MIRALAX) packet 17 g    ondansetron (ZOFRAN ODT) tablet 4 mg    Or    ondansetron (ZOFRAN) injection 4 mg    heparin (porcine) injection 5,000 Units    piperacillin-tazobactam (ZOSYN) 3.375 g in 0.9% sodium chloride (MBP/ADV) 100 mL MBP    albuterol-ipratropium (DUO-NEB) 2.5 MG-0.5 MG/3 ML    ferrous sulfate 300 mg (60 mg iron)/5 mL oral syrup 300 mg    acetaminophen (TYLENOL) solution 650 mg        Labs:    Recent Labs     04/11/22  0430 04/10/22  0338 04/09/22  0332   WBC 22.3* 26.8* 24.0*   HGB 10.5* 12.1 12.1    430* 386     Recent Labs     04/11/22  0430 04/10/22  0338 04/09/22  0332   * 155* 155*   K 4.1 4.7 4.3   * 123* 124*   CO2 24 22 25   * 362* 227*   BUN 94* 92* 59*   CREA 1.55* 1.87* 1.53*   CA 8.5 8.7 9.0   ALB 1.6* 1.8* 1.9*   ALT 42 56 59     Recent Labs     04/11/22  0210 04/09/22  0337   PH 7.37 7.45   PCO2 40 35   PO2 84 81   HCO3 23 25   FIO2 21 21     No results for input(s): CPK, CKNDX, TROIQ in the last 72 hours.     No lab exists for component: CPKMB  No results found for: BNPP, BNP   Lab Results   Component Value Date/Time    Culture result:  04/10/2022 08:00 AM     One of four bottles has been flagged positive by instrument. Bottle has been sent to [facility] laboratory to assess for possible growth. No organisms seen on Gram stain. Multiple subcultures are in progress. No growth thus far    Culture result: Pseudomonas aeruginosa (A) 04/07/2022 02:00 PM    Culture result: No growth 5 days 04/03/2022 10:43 PM     Lab Results   Component Value Date/Time    TSH 2.880 12/12/2016 10:13 AM        Imaging:    Results from [place] encounter on 04/03/22    XR CHEST PORT    Narrative  Chest, frontal view, 4/11/2022    History: CHF. Comparison: Chest 4/9/2022. Findings: Surgical hardware at the cervical spine is incompletely imaged. The  cardiac silhouette is within normal limits. The lungs are underexpanded. No  hydrostatic edema is present. No focal consolidation, pleural effusions or  pneumothorax is identified. Degenerative changes are present in the shoulders  and thoracic spine. Impression  No acute pulmonary process. Results from [place] encounter on 04/03/22    CT HEAD WO CONT    Narrative  PROCEDURE: CT HEAD WO CONT    HISTORY:Altered mental status    COMPARISON:Head CT 3 March 2022    Department policy stipulates all CT scans at this facility are performed using  dose reduction optimization techniques as appropriate to the performed exam,  including the following: Automated exposure control, adjustments of the mA  and/or KVP according to the patient size, and the use of iterative  reconstruction technique. TECHNIQUE: Without IV contrast    Bones/extracranial soft tissues:  Maxillary sinuses and right frontal sinus  remains nearly completely opacified. Extra-axial spaces:  No hemorrhage, mass or focal fluid collection. Ventricles/sulci:  There is mild dilatation of the ventricles. Sulci are  prominent. Brain parenchyma: An area of encephalomalacia in the left frontal lobe is  showing chronic evolution without evidence of hemorrhagic conversion.  No other  focal lesions identified. No mass effect. There is good gray-white differentiation. There are  inhomogeneous areas of mildly decreased density in periventricular white matter. Impression  Chronic changes which appear stable. No acute or active intracranial process.   Chronic paranasal sinus disease

## 2022-04-11 NOTE — PROGRESS NOTES
Vancomycin Dosing Consult  Day #4 of vancomycin therapy  Consult ordered by Dr. Dulec De Los Santos for this 78 y.o. female for indication of sepsis. Antibiotic regimen: Vancomycin + fluconazole + zosyn    Temp (24hrs), Av.9 °F (36.6 °C), Min:97.3 °F (36.3 °C), Max:98.6 °F (37 °C)    Recent Labs     04/11/22  0430 04/10/22  0338 22   WBC 22.3* 26.8* 24.0*     Recent Labs     04/11/22  0430 04/10/22  0338 22   CREA 1.55* 1.87* 1.53*   BUN 94* 92* 59*     Recent Labs     22   CRP 3.44* 11.50*     Recent Labs     22   PCT 1.64* 3.23*       Estimated Creatinine Clearance: 36.1 mL/min (A) (based on SCr of 1.55 mg/dL (H)). ml/min  Concomitant nephrotoxic drugs: Lasix        Cultures:    4/3 blood: ngtd    urine: Pseudomonas  4/10 blood: pending    MRSA Swab: Not detected      Target range: Trough 10-15 mcg/mL    Last Level:      Vancomycin, random   Date/Time Value Ref Range Status   2022 03:41 PM 16.9 ug/mL Final     Comment:     No reference range has been established. Recent level history (3):  Date/Time Dose & Interval Measured Level (mcg/mL) Associated AUC/GEMA   2022 03:41 PM   04/10/2022  03:38 AM  2022 10:30 AM   500mg IV 4/10 @1731    500mg IV  @ 10:52 16.9  24.5  21.5          Ht Readings from Last 1 Encounters:   22 172.7 cm (68\")        Wt Readings from Last 1 Encounters:   04/10/22 86.6 kg (191 lb)     Ideal body weight: 63.9 kg (140 lb 14 oz)  Adjusted ideal body weight: 73 kg (160 lb 14.8 oz)        Assessment/Plan:   Patient's renal function, CRP, PCT are improving. Give Vancomycin 500mg IV x1 today. Pharmacy will order a random level tomorrow to redose.   Antimicrobial stop date TBD

## 2022-04-11 NOTE — PROGRESS NOTES
Renal Daily Progress Note    Admit Date: 4/3/2022      Subjective:   She is comfortable in bed appears to be in no distress. She is noncommunicative.       Current Facility-Administered Medications   Medication Dose Route Frequency    gabapentin (NEURONTIN) capsule 300 mg  300 mg Oral QHS    amLODIPine (NORVASC) tablet 5 mg  5 mg Oral DAILY    metoprolol tartrate (LOPRESSOR) tablet 50 mg  50 mg Oral BID    nitroglycerin (NITROBID) 2 % ointment 1 Inch  1 Inch Topical Q6H    Vancomycin random level 4/11 at 14:00   Other ONCE    hydrALAZINE (APRESOLINE) tablet 50 mg  50 mg Oral TID    dextrose 5% infusion  75 mL/hr IntraVENous CONTINUOUS    [Held by provider] furosemide (LASIX) injection 40 mg  40 mg IntraVENous DAILY    fluconazole (DIFLUCAN) 200mg/100 mL IVPB (premix)  200 mg IntraVENous Q24H    VANCOMYCIN INFORMATION NOTE   Other Rx Dosing/Monitoring    insulin glargine (LANTUS) injection 22 Units  22 Units SubCUTAneous BID    labetaloL (NORMODYNE;TRANDATE) 20 mg/4 mL (5 mg/mL) injection 20 mg  20 mg IntraVENous Q4H PRN    hydrALAZINE (APRESOLINE) 20 mg/mL injection 20 mg  20 mg IntraVENous Q6H PRN    0.9% sodium chloride infusion 250 mL  250 mL IntraVENous PRN    0.9% sodium chloride infusion 250 mL  250 mL IntraVENous PRN    insulin lispro (HUMALOG) injection   SubCUTAneous Q6H    atorvastatin (LIPITOR) tablet 20 mg  20 mg Oral QHS    latanoprost (XALATAN) 0.005 % ophthalmic solution 1 Drop  1 Drop Right Eye QPM    aspirin delayed-release tablet 81 mg  81 mg Oral DAILY    brimonidine (ALPHAGAN) 0.2 % ophthalmic solution 1 Drop  1 Drop Both Eyes TID    glucose chewable tablet 16 g  4 Tablet Oral PRN    glucagon (GLUCAGEN) injection 1 mg  1 mg IntraMUSCular PRN    acetaminophen (TYLENOL) suppository 650 mg  650 mg Rectal Q6H PRN    polyethylene glycol (MIRALAX) packet 17 g  17 g Oral DAILY PRN    ondansetron (ZOFRAN ODT) tablet 4 mg  4 mg Oral Q8H PRN    Or    ondansetron (ZOFRAN) injection 4 mg  4 mg IntraVENous Q6H PRN    heparin (porcine) injection 5,000 Units  5,000 Units SubCUTAneous Q8H    piperacillin-tazobactam (ZOSYN) 3.375 g in 0.9% sodium chloride (MBP/ADV) 100 mL MBP  3.375 g IntraVENous Q8H    albuterol-ipratropium (DUO-NEB) 2.5 MG-0.5 MG/3 ML  3 mL Nebulization Q6H PRN    ferrous sulfate 300 mg (60 mg iron)/5 mL oral syrup 300 mg  300 mg Oral BID    acetaminophen (TYLENOL) solution 650 mg  650 mg Per NG tube Q6H PRN        Review of Systems    ROS   Obtainable  Objective:     Patient Vitals for the past 8 hrs:   BP Temp Pulse Resp SpO2 Height   04/11/22 1543 (!) 174/41 97.3 °F (36.3 °C) 73 22 96 % --   04/11/22 1431 -- -- -- -- -- 5' 8\" (1.727 m)     No intake/output data recorded. 04/09 1901 - 04/11 0700  In: -   Out: 6727 [Urine:1025; Drains:200]    Physical Exam:   Physical Exam  Cardiovascular:      Rate and Rhythm: Regular rhythm. Pulmonary:      Breath sounds: Normal breath sounds. Abdominal:      General: Bowel sounds are normal.      Palpations: Abdomen is soft. Comments: On PEG feeding   Skin:     General: Skin is warm. Absent toes on the right foot        XR CHEST PORT   Final Result   No acute pulmonary process. XR CHEST PORT   Final Result   No significant change. XR CHEST PORT   Final Result      XR CHEST PORT   Final Result   No acute cardiopulmonary abnormality. .       XR CHEST PORT   Final Result      CT HEAD WO CONT   Final Result   Chronic changes which appear stable. No acute or active intracranial process. Chronic paranasal sinus disease         XR CHEST PORT   Final Result   No acute findings.            Data Review   Recent Labs     04/11/22  0430 04/10/22  0338 04/09/22  0332   WBC 22.3* 26.8* 24.0*   HGB 10.5* 12.1 12.1   HCT 35.5 40.4 39.2    430* 386     Recent Labs     04/11/22  0430 04/10/22  0338 04/09/22 0332   * 155* 155*   K 4.1 4.7 4.3   * 123* 124*   CO2 24 22 25   * 362* 227*   BUN 94* 92* 59*   CREA 1.55* 1.87* 1.53*   CA 8.5 8.7 9.0   ALB 1.6* 1.8* 1.9*   ALT 42 56 59     No components found for: [place]  Recent Labs     04/11/22  0210 04/09/22  0337   PH 7.37 7.45   PCO2 40 35   PO2 84 81   HCO3 23 25   FIO2 21 21     No results for input(s): INR, INREXT, INREXT in the last 72 hours. Assessment:           Active Problems:    Hyperkalemia (4/4/2022)      UTI (urinary tract infection) (4/4/2022)      Sepsis ([address] (4/4/2022)      RICARDO (acute kidney injury) ([facility] [address] (4/4/2022)      AMS (altered mental status) (4/4/2022)    Acute kidney injury on CKD. Creatinine down to 1.55 mg today. This should place her in stage IIIb B    Hypernatremia with a free water deficit of 4.6 L. Leukocytosis improving    CVA    Pseudomonas aeruginosa urinary tract infection    Plan:   Water flushes via the PEG  IV D5W which should give her 1.8 L of free water. Expect to see sodium around 148 mEq tomorrow.

## 2022-04-11 NOTE — PROGRESS NOTES
Comprehensive Nutrition Assessment    Type and Reason for Visit: Reassess (Goal)    Nutrition Recommendations/Plan:   Continue TF as Glucerna 1.5 Mahad at goal rate of 60 mL/hr. Flush with 220 mL water Q4H. Provides 2160 kcal (100%), 119 gm PRO (105%), 2472 mL H2O (102%). Discontinue IVF. Please document TF rate, tolerance, BMs, OP in I/Os. Nutrition Assessment:  Admitted for AMS 2/2 UTI. RD familiar with pt from last admit for CVA c/b by hematuria and gangrenous L foot. PEG in place, TF started (4/4). Hypertensive and tachypnic this AM, transferred to ICU. Per ICU RN, pt TF held all day d/t concern for aspiration pna aeb fever (102.2F Tmax) though CXR shows lungs are clear. Discussed restarting TF as able, no changes in regimen. Noted that TF not advanced past 50ml/hr while pt on medical unit, unsure why. (4/11) Pt in bed on medical floor s/p transfer from ICU today; TF to resume s/p meds administration per Nsg. Noted water flushes increased to 350 mL Q4H w/o sig. change in lytes and TF remains as Glucerna and SSI; consider Diabetes Insipidus vs dehydration. Observed on IVF(D5)? Will adjust flush and monitor lytes. Labs: Na 153, Cl 123, , BUN 94, GFR 40, Cr 1.55, Alb 1.6, Hgb 10.5, POC -358 mg/dL. Meds: D5 @ 75 mL/hr, FerrouSul, norvasc, zosyn, lopressor, humalog, lantus, hydralazine, heparin. Malnutrition Assessment:  Malnutrition Status:  No malnutrition    Context:  Chronic illness       Estimated Daily Nutrient Needs:  Energy (kcal): 2023-2427kcals (25-30kcals/kg consider bedbound 2/2 CVA and wounds); Weight Used for Energy Requirements: Current  Protein (g): 97-113g (1.2-1.4g/kg for wounds); Weight Used for Protein Requirements: Current  Fluid (ml/day): 4082-7163OM; Method Used for Fluid Requirements: 1 ml/kcal    Nutrition Related Findings:   TF continues w/o N/V/D/C per Nsg. NPO status. PEG TF as sole-source nutrition. Last BM 4/11. No edema noted.  Urine OP= 175 mL per I/Os (+ >200 mL observed at bedside after emptying. Wounds:    Unstageable,Deep tissue injury,Moisture associate skin damage,Diabetic ulcer (Unstageable- Buttocks; MASD- L upper leg; DTI L heel; Amputated R Great toe; L great toe black)       Current Nutrition Therapies:  DIET NPO  ADULT TUBE FEEDING PEG; Diabetic; Delivery Method: Continuous; Continuous Initial Rate (mL/hr): 30; Continuous Advance Tube Feeding: Yes; Advancement Volume (mL/hr): 10; Advancement Frequency: Q 4 hours; Continuous Goal Rate (mL/hr): 60; Water Flu. .. Anthropometric Measures:  · Height:  5' 8\" (172.7 cm)  · Current Body Wt:  86.6 kg (190 lb 14.7 oz) (4/10)   · Admission Body Wt:  178 lb 5.6 oz (4/4)    · Usual Body Wt:   (elkin)     · Ideal Body Wt:  140 lbs:  136.4 %   · Adjusted Body Weight:   ; Weight Adjustment for: No adjustment   · Adjusted BMI:       · BMI Category: Overweight (BMI 25.0-29. 9)       Nutrition Diagnosis:   · Biting/chewing (masticatory) difficulty,Swallowing difficulty related to cognitive or neurological impairment (CVA) as evidenced by nutrition support-enteral nutrition    Nutrition Interventions:   Food and/or Nutrient Delivery: Continue NPO,Modify tube feeding  Nutrition Education and Counseling: No recommendations at this time  Coordination of Nutrition Care: Continue to monitor while inpatient    Goals:  Meet >/=75% of EENs in >5 days, Wt maintenance within +/-0.5kg in >5 days, Signs of wound healing per nsg assessment in >5 days       Nutrition Monitoring and Evaluation:   Behavioral-Environmental Outcomes: None identified  Food/Nutrient Intake Outcomes: Enteral nutrition intake/tolerance  Physical Signs/Symptoms Outcomes: Biochemical data,Hemodynamic status,Weight,Fluid status or edema,Skin    Discharge Planning:    Enteral nutrition     Electronically signed by Norberto Acevedo RD on 4/11/2022 at 2:08 PM    Contact: ext. [phone] or [employer].

## 2022-04-11 NOTE — CONSULTS
Gastroenterology Consult     Referring Physician: Seven Blood MD     Consult Date: 4/11/2022     Subjective:     Chief Complaint: Altered Mental Status    History of Present Illness: Navya Shrestha is a 78 y.o. female who is seen in consultation for peg tube malfunction. The patient has a past medical history significant for CVA with peg tube placement, right side weakness, Left great toe gangrene,hypertension, neuropathy CKD stage 3b, type 2 Diabetes, bedridden. Ms Lynne Gardner was found with altered mental status per SNF staff. She does have sacral wounds and severe peripheral vascular disease. She is followed by Vascular surgery. She is a poor historian and majority of medical history. obtained form medical records and nursing staff. CT of the head on arrival shows chronic changes with no acute intracranial process and chronic paranasal sinus disease. Urinalysis shows pyuria and bacteria. She is on IV zosyn and IV Vancomycin. She is followed by infectious disease. Ms. Lynne Gardner is alert but non-verbal. Spoke with RN who states the night nurse reported the peg tube was occluded but current RN reports she was able to flush the peg tube without difficulty. She has glucerna infusing at 60 ml/hr without difficulty. Abdomen is soft with normoactive bowel sounds.      Past Medical History:   Diagnosis Date    Diabetes mellitus ([address]     HTN (hypertension)     Hyperlipidemia     Neuropathy     PAD (peripheral artery disease) (HCC)     PCI    Sciatica      Past Surgical History:   Procedure Laterality Date    HX AMPUTATION Right     great toe    HX CERVICAL FUSION      HX OTHER SURGICAL Bilateral     Stent placement    HX OTHER SURGICAL      HX VASCULAR STENT Bilateral     2 in right 1 in left    HX VASCULAR STENT Bilateral       Family History   Problem Relation Age of Onset    Cancer Mother     Diabetes Mother     Hypertension Mother      Social History     Tobacco Use    Smoking status: Never Smoker    Smokeless tobacco: Never Used   Substance Use Topics    Alcohol use: Not Currently      No Known Allergies  Current Facility-Administered Medications   Medication Dose Route Frequency    gabapentin (NEURONTIN) capsule 300 mg  300 mg Oral QHS    amLODIPine (NORVASC) tablet 5 mg  5 mg Oral DAILY    metoprolol tartrate (LOPRESSOR) tablet 50 mg  50 mg Oral BID    nitroglycerin (NITROBID) 2 % ointment 1 Inch  1 Inch Topical Q6H    Vancomycin random level 4/11 at 14:00   Other ONCE    hydrALAZINE (APRESOLINE) tablet 50 mg  50 mg Oral TID    dextrose 5% infusion  75 mL/hr IntraVENous CONTINUOUS    [Held by provider] furosemide (LASIX) injection 40 mg  40 mg IntraVENous DAILY    fluconazole (DIFLUCAN) 200mg/100 mL IVPB (premix)  200 mg IntraVENous Q24H    VANCOMYCIN INFORMATION NOTE   Other Rx Dosing/Monitoring    insulin glargine (LANTUS) injection 22 Units  22 Units SubCUTAneous BID    labetaloL (NORMODYNE;TRANDATE) 20 mg/4 mL (5 mg/mL) injection 20 mg  20 mg IntraVENous Q4H PRN    hydrALAZINE (APRESOLINE) 20 mg/mL injection 20 mg  20 mg IntraVENous Q6H PRN    0.9% sodium chloride infusion 250 mL  250 mL IntraVENous PRN    0.9% sodium chloride infusion 250 mL  250 mL IntraVENous PRN    insulin lispro (HUMALOG) injection   SubCUTAneous Q6H    atorvastatin (LIPITOR) tablet 20 mg  20 mg Oral QHS    latanoprost (XALATAN) 0.005 % ophthalmic solution 1 Drop  1 Drop Right Eye QPM    aspirin delayed-release tablet 81 mg  81 mg Oral DAILY    brimonidine (ALPHAGAN) 0.2 % ophthalmic solution 1 Drop  1 Drop Both Eyes TID    glucose chewable tablet 16 g  4 Tablet Oral PRN    glucagon (GLUCAGEN) injection 1 mg  1 mg IntraMUSCular PRN    acetaminophen (TYLENOL) suppository 650 mg  650 mg Rectal Q6H PRN    polyethylene glycol (MIRALAX) packet 17 g  17 g Oral DAILY PRN    ondansetron (ZOFRAN ODT) tablet 4 mg  4 mg Oral Q8H PRN    Or    ondansetron (ZOFRAN) injection 4 mg  4 mg IntraVENous Q6H PRN    heparin (porcine) injection 5,000 Units  5,000 Units SubCUTAneous Q8H    piperacillin-tazobactam (ZOSYN) 3.375 g in 0.9% sodium chloride (MBP/ADV) 100 mL MBP  3.375 g IntraVENous Q8H    albuterol-ipratropium (DUO-NEB) 2.5 MG-0.5 MG/3 ML  3 mL Nebulization Q6H PRN    ferrous sulfate 300 mg (60 mg iron)/5 mL oral syrup 300 mg  300 mg Oral BID    acetaminophen (TYLENOL) solution 650 mg  650 mg Per NG tube Q6H PRN        Review of Systems:  A detailed 10 organ review of systems is obtained with pertinent positives as listed in the History of Present Illness and Past Medical History. All others are negative. Objective:     Physical Exam:  Visit Vitals  BP (!) 155/68   Pulse 72   Temp 97.5 °F (36.4 °C)   Resp 20   Ht 5' 8\" (1.727 m)   Wt 86.6 kg (191 lb)   SpO2 98%   BMI 29.04 kg/m²        Skin:  Extremities and face reveal no rashes. No herrera erythema. No telangiectasias on the chest wall. HEENT: Sclerae anicteric. Extra-occular muscles are intact. No oral ulcers. Cardiovascular: Regular rate and rhythm. Respiratory:  Comfortable breathing with no accessory muscle use. GI:  Abdomen nondistended, soft, and nontender. Normal active bowel sounds. Peg tube site w/o redness or drainage. Rectal:  Deferred  Musculoskeletal: Generalized weakness  Neurological:  Alert, non verbal  Psychiatric:  Not anxious  Lymphatic:  No cervical or supraclavicular adenopathy.     Lab/Data Review:  CMP:   Lab Results   Component Value Date/Time     (H) 04/11/2022 04:30 AM    K 4.1 04/11/2022 04:30 AM     (H) 04/11/2022 04:30 AM    CO2 24 04/11/2022 04:30 AM    AGAP 6 04/11/2022 04:30 AM     (H) 04/11/2022 04:30 AM    BUN 94 (H) 04/11/2022 04:30 AM    CREA 1.55 (H) 04/11/2022 04:30 AM    GFRAA 40 (L) 04/11/2022 04:30 AM    GFRNA 33 (L) 04/11/2022 04:30 AM    CA 8.5 04/11/2022 04:30 AM    ALB 1.6 (L) 04/11/2022 04:30 AM    TP 6.4 04/11/2022 04:30 AM    GLOB 4.8 (H) 04/11/2022 04:30 AM    AGRAT 0.3 (L) 04/11/2022 04:30 AM ALT 42 04/11/2022 04:30 AM     CBC:   Lab Results   Component Value Date/Time    WBC 22.3 (H) 04/11/2022 04:30 AM    HGB 10.5 (L) 04/11/2022 04:30 AM    HCT 35.5 04/11/2022 04:30 AM     04/11/2022 04:30 AM         Assessment/Plan:     1. Peg tube Malfunction (Resolved)      Currently peg tube is infusing Glucerna at 60 ml/hr      Per RN peg tube flushed w/o difficulty      Peg tube site w/o redness or drainage. 2. CKD      Nephrology input appreciated  3. Hypertension     Continue amolipine 5 mg daily      Continue hydralazine 50 mg TID      Metoprolol 50 mg BID  4. Type 2 Diabetes      Continue home hypoglycemic medications      Continue bedside glucose monitoring    Thank you for allowing me to participate in this patients care. Plan discussed with Dr. Cozette Dance and he approves.

## 2022-04-11 NOTE — PROGRESS NOTES
Progress Note    Patient: Cedric Stevens MRN: [id number]  SSN: [id number]    YOB: 1945  Age: 78 y.o. Sex: female      Admit Date: 4/3/2022    LOS: 7 days     Subjective:   Patient followed for sepsis with UTI secondary to Pseudomonas sensitive to Zosyn. She is now afebrile with decreasing WBC, procal and CRP. She has been transferred out of the ICU. Her CXR is unremarkable and she is now on room air. Blood cultures negativ so far. Patient lying in bed, remains nonverbal.  Family member at bedside. Objective:     Vitals:    04/11/22 0542 04/11/22 0752 04/11/22 1431 04/11/22 1543   BP: (!) 159/80 (!) 155/68  (!) 174/41   Pulse: 71 72  73   Resp: 21 20 22   Temp: 97.3 °F (36.3 °C) 97.5 °F (36.4 °C)  97.3 °F (36.3 °C)   SpO2: 97% 98%  96%   Weight:       Height:   5' 8\" (1.727 m)         Intake and Output:  Current Shift: No intake/output data recorded. Last three shifts: 04/09 1901 - 04/11 0700  In: -   Out: 1225 [Urine:1025; Drains:200]    Physical Exam:   Vitals and nursing note reviewed. Constitutional:       General: She is not in acute distress. Appearance: She is ill-appearing. HENT: eyes closed, Room Air   Cardiovascular:      Rate and Rhythm: Normal rate and regular rhythm. Heart sounds: No murmur heard. Pulmonary:      Effort: Pulmonary effort is normal.      Breath sounds: Normal breath sounds. Abdominal:      General: Bowel sounds are normal.      Palpations: Abdomen is soft. Tenderness: There is no abdominal tenderness. Comments: PEG tube in place, site unremarkable   Genitourinary:     Comments: Bell catheter  Musculoskeletal:      Right lower leg: No edema. Left lower leg: No edema. Comments: Left great toe with dry gangrene, dark and shrunken   Skin: Stage 2 sacral wound     Findings: No rash. Neurological:      Mental Status: She is alert.       Comments: Unable to assess   Psychiatric:      Comments: Unable to assess      Lab/Data Review:     WBC 22,300    Procal 1.64 <3.37 <3.78 <4.78 <6.93  CRP 3.44 <11.50 < 14.50 <16.10 <21.40 <28.40    Blood cultures (4/3) No growth FINAL  Blood cultures (4/3) 1 of  4 bottles flagged positive, No organisms seen  Urine culture (4/7) >100,000 cfu/ml Pseudomonas aeruginosa       CXR (4/11) No acute pulmonary process    Assessment:     Active Problems:    Hyperkalemia (4/4/2022)      UTI (urinary tract infection) (4/4/2022)      Sepsis ([facility] [address] (4/4/2022)      RICARDO (acute kidney injury) ([facility] [address] (4/4/2022)      AMS (altered mental status) (4/4/2022)    1. Sepsis with fever and leukocytosis, elevated procal and CRP, resolving  2. UTI with marked pyuria and bacteriuria, secondary to Pseudomonas aeruginosa, Day #8 IV Zosyn  3. Altered mentals status, multifactorial including sepsis  4. Uncontrolled diabetes mellitus with hyperglycemia  5. Chronic maxillary sinusitis  6. ASCVD with prior stroke  7. Hepatitis C antibody positive, HCV RNA negative  8. 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 Sacral wound, Stage 2, no evidence of gross infection  10. Acute hypoxic respiratory failure, resolved     Comment:  Unclear why she became febrile on Zosyn since urine isolate of Pseudomonas is sensitive. WBC now declining as well after Vancomycin and Fluconazole were started. That being said, procal and CRP have steadily declined. Would start de-escalation. Plan:   1. Continue IV Zosyn and Vancomycin  2. Discontinue Fluconazole  3. Follow-up blood cultures  4.  In am, repeat CBC, procal and CRP        Signed By: Tayla Almonte MD     April 11, 2022

## 2022-04-11 NOTE — PROGRESS NOTES
Transfer report given to [employer]  on [address]. Report included the following information SBAR, Intake/Output, MAR, Recent Results and Quality Measures.

## 2022-04-11 NOTE — PROGRESS NOTES
PROGRESS NOTE    Patient: Rambo Jaime MRN: [id number]  SSN: [id number]    YOB: 1945  Age: 78 y.o. Sex: female      Admit Date: 4/3/2022    LOS: 7 days       Subjective     Chief Complaint   Patient presents with    Altered mental status       HPI: Patient is a 78y.o. 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 04/05   Patient is seen at bedside with daughter and nephew today. Patient is in distress due to pain and daughter notes that patient gets Neurontin TID for neuropathy daily. Otherwise, patient is still receiving enteric feeding through PEG tube and getting IVF. Patient was seen by nephrology yesterday. Recommends obtaining renal panel and continuing IVF. Patient was seen by ID yesterday. Recommends continuing IV vancomycin for urosepsis. Patient was seen by pulmonology today. Recommends PRBC transfusion. Labs indicate WBC 16.9, Hgb 6.3, Na 148, Cl 120, BUN 72, Cr 1.71, Ca 8, CRP 28.4, pro-carrie 6.93 and .       04/06   Patient is seen at bedside. Patient received 2 units of PRBC. Patient is on Zosyn. No new changes today. Patient seen by the vascular surgeon he recommend great toe amputation  And for sacral wound recommend Medihoney ointment     Labs show increasing Hgb of 9, decreasing WBC 14.7, Na 147, , BUN 55,   Cr 1.47, and CRP 21.4. CXR is unremarkable. 04/07  Patient is seen resting at bedside.  Patient received one unit of PRBC with increased Hgb of 10.6. No acute changes today. Patient is seen by nephrology who recommends free water flushes to correct hypernatremia. Labs show increasing Hgb of 10.6, WBC 15.1, pro-calcitonin 3.78, and CRP 16.1.    4/8    Patient had a rapid response this morning ABG and chest x-ray was ordered  Patient tachycardic tachypneic  ABG  pH 7.51 PCO2 31 PO2 65 bicarb 26 oxygen saturation 95% on room air    Chest x-ray shows no much changes    4/9    Patient resting the bed open eyes  On room air/breathing through the mouth    4/10    Patient open eyes not in distress breathing through her mouth  According to the nurse patient not sleep all day and all night yesterday      4/11  Patient is seen at bedside. She is awake but not in any distress. Patient is on zosyn. PEG tube in place. Urine culture positive for Pseudomonas    CXR shows no focal changes. Labs remarkable for WBC  22.3, Hgb 10.5, Na 153, , Cr 1.55 BUN 94, pro-calcitonin 1.64, and CRP 3.44. Review of Systems   Unable to perform ROS: Acuity of condition     Unable to obtain. Objective     Visit Vitals  BP (!) 155/68   Pulse 72   Temp 97.5 °F (36.4 °C)   Resp 20   Ht 5' 8\" (1.727 m)   Wt 86.6 kg (191 lb)   SpO2 98%   BMI 29.04 kg/m²    O2 Flow Rate (L/min): 0 l/min O2 Device: None (Room air)    Physical Exam:   Physical Exam  Constitutional: Open eyes do not follow any commands. Tracks with eyes. HENT:   Head: Normocephalic and atraumatic. Eyes: Pupils are equal, round, and reactive to light. EOM are normal.   Cardiovascular: Normal rate, regular rhythm and normal heart sounds. Pulmonary/Chest: Decreased breath sound   abdominal: Soft. Bowel sounds are normal. There is no abdominal tenderness. There is no rebound and no guarding. Musculoskeletal: Normal range of motion.    Neurological: Open eyes do not follow any, right-sided weakness  Intake & Output:  Current Shift: No intake/output data recorded. Last three shifts: 04/09 1901 - 04/11 0700  In: -   Out: 1225 [Urine:1025; Drains:200]    Lab/Data Review: All lab results for the last 24 hours reviewed.        24 Hour Results:    Recent Results (from the past 24 hour(s))   GLUCOSE, POC    Collection Time: 04/10/22 12:58 PM   Result Value Ref Range    Glucose (POC) 339 (H) 65 - 117 mg/dL    Performed by Nell Graff    ECHO ADULT COMPLETE    Collection Time: 04/10/22  4:08 PM   Result Value Ref Range    LA Minor Axis 4.6 cm    LA Major [place] 5.3 cm    LA Area 2C 13.9 cm2    LA Area 4C 13.6 cm2    LA Volume BP 31 22 - 52 mL    LA Diameter 3.0 cm    AV Peak Velocity 0.9 m/s    AV Peak Gradient 3 mmHg    AV Area by Peak Velocity 2.7 cm2    Aortic Root 2.7 cm    IVSd 1.4 (A) 0.6 - 0.9 cm    LVIDd 4.3 3.9 - 5.3 cm    LVIDs 2.9 cm    LVOT Diameter 1.9 cm    LVOT Peak Velocity 0.9 m/s    LVOT Peak Gradient 3 mmHg    LVPWd 1.4 (A) 0.6 - 0.9 cm    LV E' Lateral Velocity 4 cm/s    LV E' Septal Velocity 4 cm/s    LVOT Area 2.8 cm2    MV E Wave Deceleration Time 254.0 ms    MV A Velocity 1.03 m/s    MV E Velocity 0.81 m/s    WV Max Velocity 1.0 m/s    Pulmonary Artery EDP 4 mmHg    PV Max Velocity 0.7 m/s    PV Peak Gradient 2 mmHg    RVIDd 3.6 cm    TR Max Velocity 3.04 m/s    TR Peak Gradient 37 mmHg    Fractional Shortening 2D 33 28 - 44 %    LVIDd Index 2.15 cm/m2    LVIDs Index 1.45 cm/m2    LV RWT Ratio 0.65     LV Mass 2D 232.2 (A) 67 - 162 g    LV Mass 2D Index 116.1 (A) 43 - 95 g/m2    MV E/A 0.79     E/E' Ratio (Averaged) 20.25     E/E' Lateral 20.25     E/E' Septal 20.25     LA Volume Index BP 16 16 - 34 ml/m2    LA Size Index 1.50 cm/m2    LA/AO Root Ratio 1.11     Ao Root Index 1.35 cm/m2    AV Velocity Ratio 1.00     BARBER/BSA Peak Velocity 1.4 cm2/m2   GLUCOSE, POC    Collection Time: 04/10/22  4:29 PM   Result Value Ref Range    Glucose (POC) 276 (H) 65 - 117 mg/dL    Performed by Tona 38, POC    Collection Time: 04/11/22 12:50 AM Result Value Ref Range    Glucose (POC) 228 (H) 65 - 117 mg/dL    Performed by Florence Kellogg    BLOOD GAS, ARTERIAL    Collection Time: 04/11/22  2:10 AM   Result Value Ref Range    pH 7.37 7.35 - 7.45      PCO2 40 35 - 45 mmHg    PO2 84 75 - 100 mmHg    O2 SAT 97 >95 %    BICARBONATE 23 22 - 26 mmol/L    BASE DEFICIT 1.8 0 - 2 mmol/L    O2 METHOD RA      FIO2 21 %    SITE Right Radial      EVELIN'S TEST PASS     CBC WITH AUTOMATED DIFF    Collection Time: 04/11/22  4:30 AM   Result Value Ref Range    WBC 22.3 (H) 3.6 - 11.0 K/uL    RBC 3.78 (L) 3.80 - 5.20 M/uL    HGB 10.5 (L) 11.5 - 16.0 g/dL    HCT 35.5 35.0 - 47.0 %    MCV 93.9 80.0 - 99.0 FL    MCH 27.8 26.0 - 34.0 PG    MCHC 29.6 (L) 30.0 - 36.5 g/dL    RDW 17.3 (H) 11.5 - 14.5 %    PLATELET 566 268 - 060 K/uL    MPV 11.7 8.9 - 12.9 FL    NRBC 0.1 (H) 0.0  WBC    ABSOLUTE NRBC 0.02 (H) 0.00 - 0.01 K/uL    NEUTROPHILS 91 (H) 32 - 75 %    LYMPHOCYTES 5 (L) 12 - 49 %    MONOCYTES 3 (L) 5 - 13 %    EOSINOPHILS 0 0 - 7 %    BASOPHILS 0 0 - 1 %    IMMATURE GRANULOCYTES 1 (H) 0 - 0.5 %    ABS. NEUTROPHILS 20.3 (H) 1.8 - 8.0 K/UL    ABS. LYMPHOCYTES 1.1 0.8 - 3.5 K/UL    ABS. MONOCYTES 0.7 0.0 - 1.0 K/UL    ABS. EOSINOPHILS 0.0 0.0 - 0.4 K/UL    ABS. BASOPHILS 0.0 0.0 - 0.1 K/UL    ABS. IMM.  GRANS. 0.2 (H) 0.00 - 0.04 K/UL    DF AUTOMATED     C REACTIVE PROTEIN, QT    Collection Time: 04/11/22  4:30 AM   Result Value Ref Range    C-Reactive protein 3.44 (H) 0.00 - 0.60 mg/dL   PROCALCITONIN    Collection Time: 04/11/22  4:30 AM   Result Value Ref Range    Procalcitonin 1.64 (H) 0 ng/mL   METABOLIC PANEL, COMPREHENSIVE    Collection Time: 04/11/22  4:30 AM   Result Value Ref Range    Sodium 153 (H) 136 - 145 mmol/L    Potassium 4.1 3.5 - 5.1 mmol/L    Chloride 123 (H) 97 - 108 mmol/L    CO2 24 21 - 32 mmol/L    Anion gap 6 5 - 15 mmol/L    Glucose 288 (H) 65 - 100 mg/dL    BUN 94 (H) 6 - 20 mg/dL    Creatinine 1.55 (H) 0.55 - 1.02 mg/dL    BUN/Creatinine ratio 61 (H) 12 - 20      GFR est AA 40 (L) >60 ml/min/1.73m2    GFR est non-AA 33 (L) >60 ml/min/1.73m2    Calcium 8.5 8.5 - 10.1 mg/dL    Bilirubin, total 0.3 0.2 - 1.0 mg/dL    AST (SGOT) 33 15 - 37 U/L    ALT (SGPT) 42 12 - 78 U/L    Alk. phosphatase 95 45 - 117 U/L    Protein, total 6.4 6.4 - 8.2 g/dL    Albumin 1.6 (L) 3.5 - 5.0 g/dL    Globulin 4.8 (H) 2.0 - 4.0 g/dL    A-G Ratio 0.3 (L) 1.1 - 2.2     GLUCOSE, POC    Collection Time: 04/11/22  5:41 AM   Result Value Ref Range    Glucose (POC) 270 (H) 65 - 117 mg/dL    Performed by [address] [place] [address], POC    Collection Time: 04/11/22 11:37 AM   Result Value Ref Range    Glucose (POC) 292 (H) 65 - 117 mg/dL    Performed by [employer]          Imaging:    XR CHEST PORT   Final Result   No acute pulmonary process. XR CHEST PORT   Final Result   No significant change. XR CHEST PORT   Final Result      XR CHEST PORT   Final Result   No acute cardiopulmonary abnormality. .       XR CHEST PORT   Final Result      CT HEAD WO CONT   Final Result   Chronic changes which appear stable. No acute or active intracranial process. Chronic paranasal sinus disease         XR CHEST PORT   Final Result   No acute findings.              Assessment   Severe sepsis with UTI  UTI/Pseudomonas  Acute on chronic kidney disease  Hyperkalemia  Hypernatremia  CVA with right-sided weakness  Anemia of chronic disease  Gangrene of the left great toe  Sacral decubitus ulcer  Acute respiratory failure  Possible aspiration  Elevated BNP    Plan     Amlodipine 5 mg daily  Aspirin 81 mg daily  Lipitor 20 daily  Ferrous sulfate 300 twice a day  Flucanazole 200 mg daily  Gabapentin 300 mg at bedtime  Heparin 5000 units subcu every 8 hours  Hydralazine 50 mg 3 times a day  Lantus 22 units subcu twice a day  Metoprolol 50 mg twice a day  IV Zosyn 3.375 IV every 8 hours  Vancomycin per pharmacy protocol      Monitor renal function and sodium level      Pulmonary nephrology infectious disease and vascular surgical.  Follow-up with the vascular surgeon regarding amputation of the left great toe    Start PEG tube feeding with water flush   Start back on gabapentin at night only    Blood sugars running high secondary D5 at 75 cc/h as per nephrology  Transfer telemetry floor      Current Facility-Administered Medications:     gabapentin (NEURONTIN) capsule 300 mg, 300 mg, Oral, QHS, Israel Soto MD    amLODIPine (NORVASC) tablet 5 mg, 5 mg, Oral, DAILY, Israel Soto MD, 5 mg at 04/11/22 0936    metoprolol tartrate (LOPRESSOR) tablet 50 mg, 50 mg, Oral, BID, Israel Soto MD, 50 mg at 04/11/22 0937    nitroglycerin (NITROBID) 2 % ointment 1 Inch, 1 Inch, Topical, Q6H, Gely Corona MD, 1 Inch at 04/11/22 0554    Vancomycin random level 4/11 at 14:00, , Other, ONCE, Trudy Garcia MD    hydrALAZINE (APRESOLINE) tablet 50 mg, 50 mg, Oral, TID, Cristian Nguyễn MD, 50 mg at 04/11/22 0937    dextrose 5% infusion, 75 mL/hr, IntraVENous, CONTINUOUS, Clair Berry MD, Last Rate: 75 mL/hr at 04/11/22 0554, 75 mL/hr at 04/11/22 0554    [Held by provider] furosemide (LASIX) injection 40 mg, 40 mg, IntraVENous, DAILY, Cristian Nguyễn MD, 40 mg at 04/09/22 1054    fluconazole (DIFLUCAN) 200mg/100 mL IVPB (premix), 200 mg, IntraVENous, Q24H, Trudy Garcia MD, Last Rate: 100 mL/hr at 04/10/22 1157, 200 mg at 04/10/22 1157    VANCOMYCIN INFORMATION NOTE, , Other, Rx Dosing/Monitoring, Trudy Garcia MD    insulin glargine (LANTUS) injection 22 Units, 22 Units, SubCUTAneous, BID, Israel Soto MD, 22 Units at 04/11/22 0936    labetaloL (NORMODYNE;TRANDATE) 20 mg/4 mL (5 mg/mL) injection 20 mg, 20 mg, IntraVENous, Q4H PRN, Cristian Nguyễn MD, 20 mg at 04/10/22 2017    hydrALAZINE (APRESOLINE) 20 mg/mL injection 20 mg, 20 mg, IntraVENous, Q6H PRN, Israel Soto MD, 20 mg at 04/11/22 0554    0.9% sodium chloride infusion 250 mL, 250 mL, IntraVENous, PRN, Martínez Crespo MD    0.9% sodium chloride infusion 250 mL, 250 mL, IntraVENous, PRN, Nancy Soto MD    insulin lispro (HUMALOG) injection, , SubCUTAneous, Q6H, Israel Soto MD, 7 Units at 04/11/22 0554    atorvastatin (LIPITOR) tablet 20 mg, 20 mg, Oral, QHS, Israel Soto MD, 20 mg at 04/10/22 2200    latanoprost (XALATAN) 0.005 % ophthalmic solution 1 Drop, 1 Drop, Right Eye, QPM, Dusty Garcia MD, 1 Drop at 04/09/22 1826    aspirin delayed-release tablet 81 mg, 81 mg, Oral, DAILY, Israel Soto MD, 81 mg at 04/11/22 4692    brimonidine (ALPHAGAN) 0.2 % ophthalmic solution 1 Drop, 1 Drop, Both Eyes, TID, Israel Soto MD, 1 Drop at 04/10/22 2350    glucose chewable tablet 16 g, 4 Tablet, Oral, PRN, Nancy Soto MD    glucagon (GLUCAGEN) injection 1 mg, 1 mg, IntraMUSCular, PRN, MD Kesha Felix  [DISCONTINUED] acetaminophen (TYLENOL) tablet 650 mg, 650 mg, Oral, Q6H PRN, 650 mg at 04/04/22 2246 **OR** acetaminophen (TYLENOL) suppository 650 mg, 650 mg, Rectal, Q6H PRN, Nancy Soto MD    polyethylene glycol (MIRALAX) packet 17 g, 17 g, Oral, DAILY PRN, Nancy Soto MD    ondansetron (ZOFRAN ODT) tablet 4 mg, 4 mg, Oral, Q8H PRN **OR** ondansetron (ZOFRAN) injection 4 mg, 4 mg, IntraVENous, Q6H PRN, Israel Soto MD    heparin (porcine) injection 5,000 Units, 5,000 Units, SubCUTAneous, Q8H, Israel Soto MD, 5,000 Units at 04/11/22 0554    piperacillin-tazobactam (ZOSYN) 3.375 g in 0.9% sodium chloride (MBP/ADV) 100 mL MBP, 3.375 g, IntraVENous, Q8H, Israel Soto MD, Last Rate: 25 mL/hr at 04/11/22 0937, 3.375 g at 04/11/22 0937    albuterol-ipratropium (DUO-NEB) 2.5 MG-0.5 MG/3 ML, 3 mL, Nebulization, Q6H PRN, Israel Soto MD    ferrous sulfate 300 mg (60 mg iron)/5 mL oral syrup 300 mg, 300 mg, Oral, BID, Israel Soto MD, 300 mg at 04/11/22 0936    acetaminophen (TYLENOL) solution 650 mg, 650 mg, Per NG tube, Q6H PRN, Mohiuddin, Laban Mcardle, MD, 650 mg at 04/09/22 0437    Current Outpatient Medications   Medication Instructions    acidophilus-pectin, citrus 25 million cell -100 mg tab tablet 2 Tablets, Oral, DAILY    amLODIPine (NORVASC) 5 mg, Oral, DAILY    artificial saliva (MOUTH KOTE) spra 1 Spray, Oral, 3 TIMES DAILY    aspirin delayed-release 81 mg tablet Oral, DAILY    atorvastatin (LIPITOR) 20 mg tablet TAKE 1 TABLET BY MOUTH EVERY NIGHT    brimonidine (ALPHAGAN) 0.2 % ophthalmic solution 1 Drop, Both Eyes, 3 TIMES DAILY    ferrous sulfate 325 mg, Oral, 2 TIMES DAILY    gabapentin (NEURONTIN) 300 mg, Oral, 2 TIMES DAILY    insulin glargine (LANTUS) 50 Units, SubCUTAneous, DAILY    latanoprost (XALATAN) 0.005 % ophthalmic solution 1 Drop, Right Eye, EVERY EVENING    lidocaine (XYLOCAINE) 4 % (40 mg/mL) topical solution 1 mL, Topical, AS NEEDED    metoprolol succinate (TOPROL-XL) 50 mg, Oral, 2 TIMES DAILY         Signed By: Manuel Hyde MD     April 11, 2022

## 2022-04-11 NOTE — PROGRESS NOTES
PROGRESS NOTE    Patient: Oliverio Pablo MRN: [id number]  SSN: [id number]    YOB: 1945  Age: 78 y.o. Sex: female      Admit Date: 4/3/2022    LOS: 7 days       Subjective     Chief Complaint   Patient presents with    Altered mental status       HPI: Patient is a 78y.o. 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 04/05   Patient is seen at bedside with daughter and nephew today. Patient is in distress due to pain and daughter notes that patient gets Neurontin TID for neuropathy daily. Otherwise, patient is still receiving enteric feeding through PEG tube and getting IVF. Patient was seen by nephrology yesterday. Recommends obtaining renal panel and continuing IVF. Patient was seen by ID yesterday. Recommends continuing IV vancomycin for urosepsis. Patient was seen by pulmonology today. Recommends PRBC transfusion. Labs indicate WBC 16.9, Hgb 6.3, Na 148, Cl 120, BUN 72, Cr 1.71, Ca 8, CRP 28.4, pro-carrie 6.93 and .       04/06   Patient is seen at bedside. Patient received 2 units of PRBC. Patient is on Zosyn. No new changes today. Patient seen by the vascular surgeon he recommend great toe amputation  And for sacral wound recommend Medihoney ointment     Labs show increasing Hgb of 9, decreasing WBC 14.7, Na 147, , BUN 55,   Cr 1.47, and CRP 21.4. CXR is unremarkable. 04/07  Patient is seen resting at bedside.  Patient received one unit of PRBC with increased Hgb of 10.6. No acute changes today. Patient is seen by nephrology who recommends free water flushes to correct hypernatremia. Labs show increasing Hgb of 10.6, WBC 15.1, pro-calcitonin 3.78, and CRP 16.1.    4/8    Patient had a rapid response this morning ABG and chest x-ray was ordered  Patient tachycardic tachypneic  ABG  pH 7.51 PCO2 31 PO2 65 bicarb 26 oxygen saturation 95% on room air    Chest x-ray shows no much changes    4/9    Patient resting the bed open eyes  On room air/breathing through the mouth    4/10    Patient open eyes not in distress breathing through her mouth  According to the nurse patient not sleep all day and all night yesterday      4/11  Patient is seen at bedside. She is awake but not in any distress. Patient is on zosyn. PEG tube in place. CXR shows no focal changes. Labs remarkable for WBC  22.3, Hgb 10.5, Na 153, , Cr 1.55 BUN 94, pro-calcitonin 1.64, and CRP 3.44. Review of Systems   Unable to perform ROS: Acuity of condition     Unable to obtain. Objective     Visit Vitals  BP (!) 155/68   Pulse 72   Temp 97.5 °F (36.4 °C)   Resp 20   Ht 5' 8\" (1.727 m)   Wt 191 lb (86.6 kg)   SpO2 98%   BMI 29.04 kg/m²    O2 Flow Rate (L/min): 0 l/min O2 Device: None (Room air)    Physical Exam:   Physical Exam  Constitutional: Open eyes do not follow any commands. Tracks with eyes. HENT:   Head: Normocephalic and atraumatic. Eyes: Pupils are equal, round, and reactive to light. EOM are normal.   Cardiovascular: Normal rate, regular rhythm and normal heart sounds. Pulmonary/Chest: Decreased breath sound   abdominal: Soft. Bowel sounds are normal. There is no abdominal tenderness. There is no rebound and no guarding. Musculoskeletal: Normal range of motion. Neurological: Open eyes do not follow any, right-sided weakness  Intake & Output:  Current Shift: No intake/output data recorded.   Last three shifts: 04/09 1901 - 04/11 0700  In: -   Out: 1225 [Urine:1025; Drains:200]    Lab/Data Review: All lab results for the last 24 hours reviewed.        24 Hour Results:    Recent Results (from the past 24 hour(s))   GLUCOSE, POC    Collection Time: 04/10/22 11:39 AM   Result Value Ref Range    Glucose (POC) 358 (H) 65 - 117 mg/dL    Performed by Ruben Cohen    GLUCOSE, POC    Collection Time: 04/10/22 12:58 PM   Result Value Ref Range    Glucose (POC) 339 (H) 65 - 117 mg/dL    Performed by Juana Mon    ECHO ADULT COMPLETE    Collection Time: 04/10/22  4:08 PM   Result Value Ref Range    LA Minor Axis 4.6 cm    LA Major Virgie 5.3 cm    LA Area 2C 13.9 cm2    LA Area 4C 13.6 cm2    LA Volume BP 31 22 - 52 mL    LA Diameter 3.0 cm    AV Peak Velocity 0.9 m/s    AV Peak Gradient 3 mmHg    AV Area by Peak Velocity 2.7 cm2    Aortic Root 2.7 cm    IVSd 1.4 (A) 0.6 - 0.9 cm    LVIDd 4.3 3.9 - 5.3 cm    LVIDs 2.9 cm    LVOT Diameter 1.9 cm    LVOT Peak Velocity 0.9 m/s    LVOT Peak Gradient 3 mmHg    LVPWd 1.4 (A) 0.6 - 0.9 cm    LV E' Lateral Velocity 4 cm/s    LV E' Septal Velocity 4 cm/s    LVOT Area 2.8 cm2    MV E Wave Deceleration Time 254.0 ms    MV A Velocity 1.03 m/s    MV E Velocity 0.81 m/s    RI Max Velocity 1.0 m/s    Pulmonary Artery EDP 4 mmHg    PV Max Velocity 0.7 m/s    PV Peak Gradient 2 mmHg    RVIDd 3.6 cm    TR Max Velocity 3.04 m/s    TR Peak Gradient 37 mmHg    Fractional Shortening 2D 33 28 - 44 %    LVIDd Index 2.15 cm/m2    LVIDs Index 1.45 cm/m2    LV RWT Ratio 0.65     LV Mass 2D 232.2 (A) 67 - 162 g    LV Mass 2D Index 116.1 (A) 43 - 95 g/m2    MV E/A 0.79     E/E' Ratio (Averaged) 20.25     E/E' Lateral 20.25     E/E' Septal 20.25     LA Volume Index BP 16 16 - 34 ml/m2    LA Size Index 1.50 cm/m2    LA/AO Root Ratio 1.11     Ao Root Index 1.35 cm/m2    AV Velocity Ratio 1.00     BARBER/BSA Peak Velocity 1.4 cm2/m2   GLUCOSE, POC    Collection Time: 04/10/22  4:29 PM   Result Value Ref Range    Glucose (POC) 276 (H) 65 - 117 mg/dL    Performed by Kristin Rodriguez, POC    Collection Time: 04/11/22 12:50 AM   Result Value Ref Range    Glucose (POC) 228 (H) 65 - 117 mg/dL    Performed by Ced Cifuentes    BLOOD GAS, ARTERIAL    Collection Time: 04/11/22  2:10 AM   Result Value Ref Range    pH 7.37 7.35 - 7.45      PCO2 40 35 - 45 mmHg    PO2 84 75 - 100 mmHg    O2 SAT 97 >95 %    BICARBONATE 23 22 - 26 mmol/L    BASE DEFICIT 1.8 0 - 2 mmol/L    O2 METHOD RA      FIO2 21 %    SITE Right Radial      EVELIN'S TEST PASS     CBC WITH AUTOMATED DIFF    Collection Time: 04/11/22  4:30 AM   Result Value Ref Range    WBC 22.3 (H) 3.6 - 11.0 K/uL    RBC 3.78 (L) 3.80 - 5.20 M/uL    HGB 10.5 (L) 11.5 - 16.0 g/dL    HCT 35.5 35.0 - 47.0 %    MCV 93.9 80.0 - 99.0 FL    MCH 27.8 26.0 - 34.0 PG    MCHC 29.6 (L) 30.0 - 36.5 g/dL    RDW 17.3 (H) 11.5 - 14.5 %    PLATELET 412 974 - 533 K/uL    MPV 11.7 8.9 - 12.9 FL    NRBC 0.1 (H) 0.0  WBC    ABSOLUTE NRBC 0.02 (H) 0.00 - 0.01 K/uL    NEUTROPHILS 91 (H) 32 - 75 %    LYMPHOCYTES 5 (L) 12 - 49 %    MONOCYTES 3 (L) 5 - 13 %    EOSINOPHILS 0 0 - 7 %    BASOPHILS 0 0 - 1 %    IMMATURE GRANULOCYTES 1 (H) 0 - 0.5 %    ABS. NEUTROPHILS 20.3 (H) 1.8 - 8.0 K/UL    ABS. LYMPHOCYTES 1.1 0.8 - 3.5 K/UL    ABS. MONOCYTES 0.7 0.0 - 1.0 K/UL    ABS. EOSINOPHILS 0.0 0.0 - 0.4 K/UL    ABS. BASOPHILS 0.0 0.0 - 0.1 K/UL    ABS. IMM.  GRANS. 0.2 (H) 0.00 - 0.04 K/UL    DF AUTOMATED     C REACTIVE PROTEIN, QT    Collection Time: 04/11/22  4:30 AM   Result Value Ref Range    C-Reactive protein 3.44 (H) 0.00 - 0.60 mg/dL   PROCALCITONIN    Collection Time: 04/11/22  4:30 AM   Result Value Ref Range    Procalcitonin 1.64 (H) 0 ng/mL   METABOLIC PANEL, COMPREHENSIVE    Collection Time: 04/11/22  4:30 AM   Result Value Ref Range    Sodium 153 (H) 136 - 145 mmol/L    Potassium 4.1 3.5 - 5.1 mmol/L    Chloride 123 (H) 97 - 108 mmol/L    CO2 24 21 - 32 mmol/L    Anion gap 6 5 - 15 mmol/L    Glucose 288 (H) 65 - 100 mg/dL    BUN 94 (H) 6 - 20 mg/dL    Creatinine 1.55 (H) 0.55 - 1.02 mg/dL    BUN/Creatinine ratio 61 (H) 12 - 20      GFR est AA 40 (L) >60 ml/min/1.73m2    GFR est non-AA 33 (L) >60 ml/min/1.73m2    Calcium 8.5 8.5 - 10.1 mg/dL    Bilirubin, total 0.3 0.2 - 1.0 mg/dL    AST (SGOT) 33 15 - 37 U/L    ALT (SGPT) 42 12 - 78 U/L    Alk. phosphatase 95 45 - 117 U/L    Protein, total 6.4 6.4 - 8.2 g/dL    Albumin 1.6 (L) 3.5 - 5.0 g/dL    Globulin 4.8 (H) 2.0 - 4.0 g/dL    A-G Ratio 0.3 (L) 1.1 - 2.2     GLUCOSE, POC    Collection Time: 04/11/22  5:41 AM   Result Value Ref Range    Glucose (POC) 270 (H) 65 - 117 mg/dL    Performed by TEMPLE DINORA          Imaging:    XR CHEST PORT   Final Result   No acute pulmonary process. XR CHEST PORT   Final Result   No significant change. XR CHEST PORT   Final Result      XR CHEST PORT   Final Result   No acute cardiopulmonary abnormality. .       XR CHEST PORT   Final Result      CT HEAD WO CONT   Final Result   Chronic changes which appear stable. No acute or active intracranial process. Chronic paranasal sinus disease         XR CHEST PORT   Final Result   No acute findings. Assessment   Severe sepsis with UTI  UTI  Acute on chronic kidney disease  Hyperkalemia  Hypernatremia  CVA with right-sided weakness  Anemia of chronic disease  Gangrene of the left great toe  Sacral decubitus ulcer  Acute respiratory failure  Possible aspiration  Elevated BNP    Plan   Transfer the patient to ICU  Lasix 40 mg IV daily  Hypernatremia follow-up with nephrology  Hydralazine 50 mg 3 times a day  IV Zosyn   sliding scale insulin  Aspirin 81 mg daily  Norvasc 5 mg daily  Metoprolol 50 twice daily  Lipitor 20 mg daily  Alphagan 0.2% 3 times a day  Ferrous sulfate 325 mg twice a day  Heparin 5000 units subcu every 8 hours  Half-normal saline at 100 cc/h  Duo-neb 2.5 mg-0.5 mg PRN  Daily renal panel per nephrology recommendation. Full code.   Start on half normal saline IVF.   Pulmonary nephrology infectious disease and vascular surgical.  Follow-up with the vascular surgeon regarding amputation of the left great toe    Start PEG tube feeding with water flush   Start back on gabapentin at night only    Blood sugars running high secondary D5 at 75 cc/h as per nephrology  Transfer telemetry floor      Current Facility-Administered Medications:     gabapentin (NEURONTIN) capsule 300 mg, 300 mg, Oral, QHS, Israel Soto MD    amLODIPine (NORVASC) tablet 5 mg, 5 mg, Oral, DAILY, Israel Soto MD    metoprolol tartrate (LOPRESSOR) tablet 50 mg, 50 mg, Oral, BID, Israel Soto MD, 50 mg at 04/10/22 2100    nitroglycerin (NITROBID) 2 % ointment 1 Inch, 1 Inch, Topical, Q6H, Grace Barraza MD, 1 Inch at 04/11/22 0554    Vancomycin random level 4/11 at 14:00, , Other, ONCE, Kameron Varma MD    hydrALAZINE (APRESOLINE) tablet 50 mg, 50 mg, Oral, TID, Sesar Oviedo MD, 50 mg at 04/10/22 2200    dextrose 5% infusion, 75 mL/hr, IntraVENous, CONTINUOUS, KassandraumamillaLeila MD, Last Rate: 75 mL/hr at 04/11/22 0554, 75 mL/hr at 04/11/22 0554    [Held by provider] furosemide (LASIX) injection 40 mg, 40 mg, IntraVENous, DAILY, Israel Soto MD, 40 mg at 04/09/22 1054    fluconazole (DIFLUCAN) 200mg/100 mL IVPB (premix), 200 mg, IntraVENous, Q24H, Kameron Varma MD, Last Rate: 100 mL/hr at 04/10/22 1157, 200 mg at 04/10/22 1157    VANCOMYCIN INFORMATION NOTE, , Other, Rx Dosing/Monitoring, Kameron Varma MD    insulin glargine (LANTUS) injection 22 Units, 22 Units, SubCUTAneous, BID, Israel Soto MD, 22 Units at 04/10/22 2018    labetaloL (NORMODYNE;TRANDATE) 20 mg/4 mL (5 mg/mL) injection 20 mg, 20 mg, IntraVENous, Q4H PRN, Israel Soto MD, 20 mg at 04/10/22 2017    hydrALAZINE (APRESOLINE) 20 mg/mL injection 20 mg, 20 mg, IntraVENous, Q6H PRN, Israel Soto MD, 20 mg at 04/11/22 0554    0.9% sodium chloride infusion 250 mL, 250 mL, IntraVENous, PRN, Martínez Crespo MD    0.9% sodium chloride infusion 250 mL, 250 mL, IntraVENous, PRN, Zoya Soto MD    insulin lispro (HUMALOG) injection, , SubCUTAneous, Q6H, Israel Soto MD, 7 Units at 04/11/22 0554    atorvastatin (LIPITOR) tablet 20 mg, 20 mg, Oral, QHS, Israel Soto MD, 20 mg at 04/10/22 2200    latanoprost (XALATAN) 0.005 % ophthalmic solution 1 Drop, 1 Drop, Right Eye, QPM, Israel Soto MD, 1 Drop at 04/09/22 1826    aspirin delayed-release tablet 81 mg, 81 mg, Oral, DAILY, Israel Soto MD, 81 mg at 04/09/22 1055    brimonidine (ALPHAGAN) 0.2 % ophthalmic solution 1 Drop, 1 Drop, Both Eyes, TID, Israel Soto MD, 1 Drop at 04/10/22 2350    glucose chewable tablet 16 g, 4 Tablet, Oral, PRN, Zoya Soto MD    glucagon (GLUCAGEN) injection 1 mg, 1 mg, IntraMUSCular, PRN, MD Palak Rodas  [DISCONTINUED] acetaminophen (TYLENOL) tablet 650 mg, 650 mg, Oral, Q6H PRN, 650 mg at 04/04/22 2246 **OR** acetaminophen (TYLENOL) suppository 650 mg, 650 mg, Rectal, Q6H PRN, Zoya Soto MD    polyethylene glycol (MIRALAX) packet 17 g, 17 g, Oral, DAILY PRN, Zoya Soto MD    ondansetron (ZOFRAN ODT) tablet 4 mg, 4 mg, Oral, Q8H PRN **OR** ondansetron (ZOFRAN) injection 4 mg, 4 mg, IntraVENous, Q6H PRN, Israel Soto MD    heparin (porcine) injection 5,000 Units, 5,000 Units, SubCUTAneous, Q8H, Israel Soto MD, 5,000 Units at 04/11/22 0554    piperacillin-tazobactam (ZOSYN) 3.375 g in 0.9% sodium chloride (MBP/ADV) 100 mL MBP, 3.375 g, IntraVENous, Q8H, Israel Soto MD, Last Rate: 25 mL/hr at 04/11/22 0121, 3.375 g at 04/11/22 0121    albuterol-ipratropium (DUO-NEB) 2.5 MG-0.5 MG/3 ML, 3 mL, Nebulization, Q6H PRN, Israel Soto MD    ferrous sulfate 300 mg (60 mg iron)/5 mL oral syrup 300 mg, 300 mg, Oral, BID, Israel Soto MD, 300 mg at 04/10/22 2100    acetaminophen (TYLENOL) solution 650 mg, 650 mg, Per NG tube, Q6H PRN, Mohiuddin, Laban Mcardle, MD, 650 mg at 04/09/22 2574    Current Outpatient Medications   Medication Instructions    acidophilus-pectin, citrus 25 million cell -100 mg tab tablet 2 Tablets, Oral, DAILY    amLODIPine (NORVASC) 5 mg, Oral, DAILY    artificial saliva (MOUTH KOTE) spra 1 Spray, Oral, 3 TIMES DAILY    aspirin delayed-release 81 mg tablet Oral, DAILY    atorvastatin (LIPITOR) 20 mg tablet TAKE 1 TABLET BY MOUTH EVERY NIGHT    brimonidine (ALPHAGAN) 0.2 % ophthalmic solution 1 Drop, Both Eyes, 3 TIMES DAILY    ferrous sulfate 325 mg, Oral, 2 TIMES DAILY    gabapentin (NEURONTIN) 300 mg, Oral, 2 TIMES DAILY    insulin glargine (LANTUS) 50 Units, SubCUTAneous, DAILY    latanoprost (XALATAN) 0.005 % ophthalmic solution 1 Drop, Right Eye, EVERY EVENING    lidocaine (XYLOCAINE) 4 % (40 mg/mL) topical solution 1 mL, Topical, AS NEEDED    metoprolol succinate (TOPROL-XL) 50 mg, Oral, 2 TIMES DAILY         Signed By: Mario Evans     April 11, 2022

## 2022-04-12 NOTE — PROGRESS NOTES
CM spoke with daughter [phone] to inform of updated clinicals being sent to  [facility]. Daughter in agreement of patient returning to [facility] when medically stable.

## 2022-04-12 NOTE — PROGRESS NOTES
IMPRESSION:   1. Sepsis  2. Aspiration pneumonia  3. Urinary tract infection  4. Acute kidney injury  5. Hyperkalemia resolved  6. History of CVA with right-sided weakness  7. Anemia  8. Hypernatremia  9. Hyperkalemia resolved  10. Gangrene of the left great toe sacral decubiti ulcer      RECOMMENDATIONS/PLAN:   1. She is on Zosyn   2. Lasix on hold  3. ABG acceptable and chest x-ray no acute infiltrate  4. Chest x-ray no acute infiltrate  5. WBC elevated most likely secondary to steroids  6. Recieve packed RBC    Time spent patient care 30 minutes  [x] High complexity decision making was performed  [x] See my orders for details  HPI  28-year-old lady nursing home resident came in as she was found confused with altered mental status family noted that she was not acting properly she is in a nursing home unable to get any history to the patient she has significant past medical history of CVA right-sided weakness hypertension diabetes mellitus she is bedridden open eyes does not follow any command possibly septic so admitted, and critical care consult was called. PMH:  has a past medical history of Diabetes mellitus ([address] HTN (hypertension), Hyperlipidemia, Neuropathy, PAD (peripheral artery disease) ([address] and Sciatica. PSH:   has a past surgical history that includes hx other surgical (Bilateral); hx amputation (Right); hx other surgical; hx cervical fusion; hx vascular stent (Bilateral); and hx vascular stent (Bilateral). FHX: family history includes Cancer in her mother; Diabetes in her mother; Hypertension in her mother. SHX:  reports that she has never smoked. She has never used smokeless tobacco. She reports previous alcohol use. She reports that she does not use drugs. ALL: No Known Allergies     MEDS:   [x] Reviewed - As Below   [] Not reviewed    Current Facility-Administered Medications   Medication    [START ON 4/13/2022] Vancomycin random level - please draw with AM labs. Thanks!     gabapentin (NEURONTIN) capsule 300 mg    amLODIPine (NORVASC) tablet 5 mg    metoprolol tartrate (LOPRESSOR) tablet 50 mg    nitroglycerin (NITROBID) 2 % ointment 1 Inch    hydrALAZINE (APRESOLINE) tablet 50 mg    dextrose 5% infusion    [Held by provider] furosemide (LASIX) injection 40 mg    VANCOMYCIN INFORMATION NOTE    insulin glargine (LANTUS) injection 22 Units    labetaloL (NORMODYNE;TRANDATE) 20 mg/4 mL (5 mg/mL) injection 20 mg    hydrALAZINE (APRESOLINE) 20 mg/mL injection 20 mg    0.9% sodium chloride infusion 250 mL    0.9% sodium chloride infusion 250 mL    insulin lispro (HUMALOG) injection    atorvastatin (LIPITOR) tablet 20 mg    latanoprost (XALATAN) 0.005 % ophthalmic solution 1 Drop    aspirin delayed-release tablet 81 mg    brimonidine (ALPHAGAN) 0.2 % ophthalmic solution 1 Drop    glucose chewable tablet 16 g    glucagon (GLUCAGEN) injection 1 mg    acetaminophen (TYLENOL) suppository 650 mg    polyethylene glycol (MIRALAX) packet 17 g    ondansetron (ZOFRAN ODT) tablet 4 mg    Or    ondansetron (ZOFRAN) injection 4 mg    heparin (porcine) injection 5,000 Units    piperacillin-tazobactam (ZOSYN) 3.375 g in 0.9% sodium chloride (MBP/ADV) 100 mL MBP    albuterol-ipratropium (DUO-NEB) 2.5 MG-0.5 MG/3 ML    ferrous sulfate 300 mg (60 mg iron)/5 mL oral syrup 300 mg    acetaminophen (TYLENOL) solution 650 mg      MAR reviewed and pertinent medications noted or modified as needed   Current Facility-Administered Medications   Medication    [START ON 4/13/2022] Vancomycin random level - please draw with AM labs. Thanks!     gabapentin (NEURONTIN) capsule 300 mg    amLODIPine (NORVASC) tablet 5 mg    metoprolol tartrate (LOPRESSOR) tablet 50 mg    nitroglycerin (NITROBID) 2 % ointment 1 Inch    hydrALAZINE (APRESOLINE) tablet 50 mg    dextrose 5% infusion    [Held by provider] furosemide (LASIX) injection 40 mg    VANCOMYCIN INFORMATION NOTE    insulin glargine (LANTUS) injection 22 Units    labetaloL (NORMODYNE;TRANDATE) 20 mg/4 mL (5 mg/mL) injection 20 mg    hydrALAZINE (APRESOLINE) 20 mg/mL injection 20 mg    0.9% sodium chloride infusion 250 mL    0.9% sodium chloride infusion 250 mL    insulin lispro (HUMALOG) injection    atorvastatin (LIPITOR) tablet 20 mg    latanoprost (XALATAN) 0.005 % ophthalmic solution 1 Drop    aspirin delayed-release tablet 81 mg    brimonidine (ALPHAGAN) 0.2 % ophthalmic solution 1 Drop    glucose chewable tablet 16 g    glucagon (GLUCAGEN) injection 1 mg    acetaminophen (TYLENOL) suppository 650 mg    polyethylene glycol (MIRALAX) packet 17 g    ondansetron (ZOFRAN ODT) tablet 4 mg    Or    ondansetron (ZOFRAN) injection 4 mg    heparin (porcine) injection 5,000 Units    piperacillin-tazobactam (ZOSYN) 3.375 g in 0.9% sodium chloride (MBP/ADV) 100 mL MBP    albuterol-ipratropium (DUO-NEB) 2.5 MG-0.5 MG/3 ML    ferrous sulfate 300 mg (60 mg iron)/5 mL oral syrup 300 mg    acetaminophen (TYLENOL) solution 650 mg      PMH:  has a past medical history of Diabetes mellitus ([facility] [address] HTN (hypertension), Hyperlipidemia, Neuropathy, PAD (peripheral artery disease) ([facility] [address] and Sciatica. PSH:   has a past surgical history that includes hx other surgical (Bilateral); hx amputation (Right); hx other surgical; hx cervical fusion; hx vascular stent (Bilateral); and hx vascular stent (Bilateral). FHX: family history includes Cancer in her mother; Diabetes in her mother; Hypertension in her mother. SHX:  reports that she has never smoked. She has never used smokeless tobacco. She reports previous alcohol use. She reports that she does not use drugs. ROS:  Unable to obtain barely unresponsive    Hemodynamics:    CO:    CI:    CVP:    SVR:   PAP Systolic:    PAP Diastolic:    PVR:    ZX33:        Ventilator Settings:      Mode Rate TV Press PEEP FiO2 PIP Min.  Vent                              Vital Signs: Telemetry:    normal sinus rhythm Intake/Output:   Visit Vitals  BP (!) 166/60 (BP 1 Location: Right upper arm, BP Patient Position: At rest;Semi fowlers)   Pulse 84   Temp 98.9 °F (37.2 °C)   Resp 20   Ht 5' 8\" (1.727 m)   Wt 86.6 kg (191 lb)   SpO2 100%   BMI 29.04 kg/m²       Temp (24hrs), Av.4 °F (36.9 °C), Min:97.3 °F (36.3 °C), Max:98.9 °F (37.2 °C)        O2 Device: None (Room air) O2 Flow Rate (L/min): 0 l/min       Wt Readings from Last 4 Encounters:   04/10/22 86.6 kg (191 lb)   22 82.2 kg (181 lb 3.5 oz)   20 84.4 kg (186 lb)   20 84.8 kg (187 lb)          Intake/Output Summary (Last 24 hours) at 2022 1051  Last data filed at 2022 0716  Gross per 24 hour   Intake 2967 ml   Output 2400 ml   Net 567 ml       Last shift:       07 -  1900  In: -   Out: 350 [Drains:350]  Last 3 shifts: 04/10 1901 -  0700  In: 2967 [I.V.:1100]  Out: 2375 [Urine:2375]       Physical Exam:     General: Open eyes but does not follow any command  HEENT: NCAT, poor dentition, lips and mucosa dry  Eyes: anicteric; conjunctiva clear  Neck: no nodes, trach midline; no accessory MM use. Chest: no deformity,   Cardiac: R regular; no murmur;   Lungs: distant breath sounds; no wheezes  Abd: soft, NT, hypoactive BS  Ext: no edema; no joint swelling; No clubbing  : NO giraldo, clear urine  Neuro: She only moves her left arm  Psych- unable to assess  Skin: warm, dry, no cyanosis;   Pulses: 1-2+ Bilateral pedal, radial  Capillary: brisk; pale      DATA:    MAR reviewed and pertinent medications noted or modified as needed  MEDS:   Current Facility-Administered Medications   Medication    [START ON 2022] Vancomycin random level - please draw with AM labs. Thanks!     gabapentin (NEURONTIN) capsule 300 mg    amLODIPine (NORVASC) tablet 5 mg    metoprolol tartrate (LOPRESSOR) tablet 50 mg    nitroglycerin (NITROBID) 2 % ointment 1 Inch    hydrALAZINE (APRESOLINE) tablet 50 mg    dextrose 5% infusion    [Held by provider] furosemide (LASIX) injection 40 mg    VANCOMYCIN INFORMATION NOTE    insulin glargine (LANTUS) injection 22 Units    labetaloL (NORMODYNE;TRANDATE) 20 mg/4 mL (5 mg/mL) injection 20 mg    hydrALAZINE (APRESOLINE) 20 mg/mL injection 20 mg    0.9% sodium chloride infusion 250 mL    0.9% sodium chloride infusion 250 mL    insulin lispro (HUMALOG) injection    atorvastatin (LIPITOR) tablet 20 mg    latanoprost (XALATAN) 0.005 % ophthalmic solution 1 Drop    aspirin delayed-release tablet 81 mg    brimonidine (ALPHAGAN) 0.2 % ophthalmic solution 1 Drop    glucose chewable tablet 16 g    glucagon (GLUCAGEN) injection 1 mg    acetaminophen (TYLENOL) suppository 650 mg    polyethylene glycol (MIRALAX) packet 17 g    ondansetron (ZOFRAN ODT) tablet 4 mg    Or    ondansetron (ZOFRAN) injection 4 mg    heparin (porcine) injection 5,000 Units    piperacillin-tazobactam (ZOSYN) 3.375 g in 0.9% sodium chloride (MBP/ADV) 100 mL MBP    albuterol-ipratropium (DUO-NEB) 2.5 MG-0.5 MG/3 ML    ferrous sulfate 300 mg (60 mg iron)/5 mL oral syrup 300 mg    acetaminophen (TYLENOL) solution 650 mg        Labs:    Recent Labs     04/12/22  0711 04/11/22  0430 04/10/22  0338   WBC 18.9* 22.3* 26.8*   HGB 11.2* 10.5* 12.1    368 430*     Recent Labs     04/12/22  0711 04/11/22  0430 04/10/22  0338   * 153* 155*   K 4.4 4.1 4.7   * 123* 123*   CO2 28 24 22   * 288* 362*   BUN 84* 94* 92*   CREA 1.33* 1.55* 1.87*   CA 8.3* 8.5 8.7   ALB 1.7* 1.6* 1.8*   ALT 40 42 56     Recent Labs     04/11/22  0210   PH 7.37   PCO2 40   PO2 84   HCO3 23   FIO2 21     No results for input(s): CPK, CKNDX, TROIQ in the last 72 hours. No lab exists for component: CPKMB  No results found for: BNPP, BNP   Lab Results   Component Value Date/Time    Culture result:  04/10/2022 08:00 AM     One of four bottles has been flagged positive by instrument.   Bottle has been sent to [facility] laboratory to assess for possible growth. No organisms seen on Gram stain. Multiple subcultures are in progress. No growth thus far    Culture result: Pseudomonas aeruginosa (A) 04/07/2022 02:00 PM    Culture result: No growth 7 days 04/03/2022 10:43 PM     Lab Results   Component Value Date/Time    TSH 2.880 12/12/2016 10:13 AM        Imaging:    Results from [place] encounter on 04/03/22    XR CHEST PORT    Narrative  Chest, frontal view, 4/11/2022    History: CHF. Comparison: Chest 4/9/2022. Findings: Surgical hardware at the cervical spine is incompletely imaged. The  cardiac silhouette is within normal limits. The lungs are underexpanded. No  hydrostatic edema is present. No focal consolidation, pleural effusions or  pneumothorax is identified. Degenerative changes are present in the shoulders  and thoracic spine. Impression  No acute pulmonary process. Results from [place] encounter on 04/03/22    CT HEAD WO CONT    Narrative  PROCEDURE: CT HEAD WO CONT    HISTORY:Altered mental status    COMPARISON:Head CT 3 March 2022    Department policy stipulates all CT scans at this facility are performed using  dose reduction optimization techniques as appropriate to the performed exam,  including the following: Automated exposure control, adjustments of the mA  and/or KVP according to the patient size, and the use of iterative  reconstruction technique. TECHNIQUE: Without IV contrast    Bones/extracranial soft tissues:  Maxillary sinuses and right frontal sinus  remains nearly completely opacified. Extra-axial spaces:  No hemorrhage, mass or focal fluid collection. Ventricles/sulci:  There is mild dilatation of the ventricles. Sulci are  prominent. Brain parenchyma: An area of encephalomalacia in the left frontal lobe is  showing chronic evolution without evidence of hemorrhagic conversion. No other  focal lesions identified. No mass effect. There is good gray-white differentiation. There are  inhomogeneous areas of mildly decreased density in periventricular white matter. Impression  Chronic changes which appear stable. No acute or active intracranial process.   Chronic paranasal sinus disease

## 2022-04-12 NOTE — PROGRESS NOTES
PROGRESS NOTE    Patient: Shorty Duong MRN: [id number]  SSN: [id number]    YOB: 1945  Age: 78 y.o. Sex: female      Admit Date: 4/3/2022    LOS: 8 days       Subjective     Chief Complaint   Patient presents with    Altered mental status       HPI: Patient is a 78y.o. 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 04/05   Patient is seen at bedside with daughter and nephew today. Patient is in distress due to pain and daughter notes that patient gets Neurontin TID for neuropathy daily. Otherwise, patient is still receiving enteric feeding through PEG tube and getting IVF. Patient was seen by nephrology yesterday. Recommends obtaining renal panel and continuing IVF. Patient was seen by ID yesterday. Recommends continuing IV vancomycin for urosepsis. Patient was seen by pulmonology today. Recommends PRBC transfusion. Labs indicate WBC 16.9, Hgb 6.3, Na 148, Cl 120, BUN 72, Cr 1.71, Ca 8, CRP 28.4, pro-carrie 6.93 and .       04/06   Patient is seen at bedside. Patient received 2 units of PRBC. Patient is on Zosyn. No new changes today. Patient seen by the vascular surgeon he recommend great toe amputation  And for sacral wound recommend Medihoney ointment     Labs show increasing Hgb of 9, decreasing WBC 14.7, Na 147, , BUN 55,   Cr 1.47, and CRP 21.4. CXR is unremarkable. 04/07  Patient is seen resting at bedside.  Patient received one unit of PRBC with increased Hgb of 10.6. No acute changes today. Patient is seen by nephrology who recommends free water flushes to correct hypernatremia. Labs show increasing Hgb of 10.6, WBC 15.1, pro-calcitonin 3.78, and CRP 16.1.    4/8    Patient had a rapid response this morning ABG and chest x-ray was ordered  Patient tachycardic tachypneic  ABG  pH 7.51 PCO2 31 PO2 65 bicarb 26 oxygen saturation 95% on room air    Chest x-ray shows no much changes    4/9    Patient resting the bed open eyes  On room air/breathing through the mouth    4/10    Patient open eyes not in distress breathing through her mouth  According to the nurse patient not sleep all day and all night yesterday      4/11  Patient is seen at bedside. She is awake but not in any distress. Patient is on zosyn. PEG tube in place. Urine culture positive for Pseudomonas. CXR shows no focal changes. Labs remarkable for WBC  22.3, Hgb 10.5, Na 153, , Cr 1.55 BUN 94, pro-calcitonin 1.64, and CRP 3.33.    4/12  Patient is on Vancomycin, zosyn and D5W. PEG tube in place. Spoke to daughter at bedside. She has no new complaints. Labs remarkable for:  WBC  18.9,  Cr 1.33, BUN 84,  and CRP 2.33 which are decreasing. Hgb 11.2, Na-corrected 158, , and pro-calcitonin 1.93 which are increasing. Seen by pulmonology. No new recommendations. Seen by GI. No new recommendations. Seen by nephrology. recommends IV D5W IVF to decrease hypernatremia. Seen by ID. Recommends discontinuing fluconazole, follow up blood cultures, and continuing IV zosyn and vancomycin. Review of Systems   Unable to perform ROS: Acuity of condition     Unable to obtain.      Objective     Visit Vitals  BP (!) 166/60 (BP 1 Location: Right upper arm, BP Patient Position: At rest;Semi fowlers)   Pulse 84   Temp 98.9 °F (37.2 °C)   Resp 20   Ht 5' 8\" (1.727 m)   Wt 86.6 kg (191 lb)   SpO2 100%   BMI 29.04 kg/m²    O2 Flow Rate (L/min): 0 l/min O2 Device: None (Room air)    Physical Exam:   Physical Exam  Constitutional: Open eyes do not follow any commands. Tracks with eyes. HENT:   Head: Normocephalic and atraumatic. Eyes: Pupils are equal, round, and reactive to light. EOM are normal.   Cardiovascular: Normal rate, regular rhythm and normal heart sounds. Pulmonary/Chest: Decreased breath sound   abdominal: Soft. Bowel sounds are normal. There is no abdominal tenderness. There is no rebound and no guarding. Musculoskeletal: Normal range of motion. Neurological: Open eyes do not follow any, right-sided weakness  Intake & Output:  Current Shift: 04/12 0701 - 04/12 1900  In: 220   Out: 350 [Drains:350]  Last three shifts: 04/10 1901 - 04/12 0700  In: 2967 [I.V.:1100]  Out: 1937 [facility] [Urine:2375]    Lab/Data Review: All lab results for the last 24 hours reviewed.        24 Hour Results:    Recent Results (from the past 24 hour(s))   VANCOMYCIN, RANDOM    Collection Time: 04/11/22  3:41 PM   Result Value Ref Range    Vancomycin, random 16.9 ug/mL   GLUCOSE, POC    Collection Time: 04/11/22  4:35 PM   Result Value Ref Range    Glucose (POC) 330 (H) 65 - 117 mg/dL    Performed by Wayne Red    GLUCOSE, POC    Collection Time: 04/11/22  7:59 PM   Result Value Ref Range    Glucose (POC) 305 (H) 65 - 117 mg/dL    Performed by Deandre Gardner    GLUCOSE, POC    Collection Time: 04/11/22 11:53 PM   Result Value Ref Range    Glucose (POC) 340 (H) 65 - 117 mg/dL    Performed by Pilo Goodwin (Float Pool)    GLUCOSE, POC    Collection Time: 04/12/22  5:53 AM   Result Value Ref Range    Glucose (POC) 348 (H) 65 - 117 mg/dL    Performed by Pilo Goodwin ([employer] Pool)    METABOLIC PANEL, COMPREHENSIVE    Collection Time: 04/12/22  7:11 AM   Result Value Ref Range    Sodium 156 (H) 136 - 145 mmol/L    Potassium 4.4 3.5 - 5.1 mmol/L    Chloride 126 (H) 97 - 108 mmol/L    CO2 28 21 - 32 mmol/L    Anion gap 2 (L) 5 - 15 mmol/L    Glucose 369 (H) 02 - 100 mg/dL    BUN 84 (H) 6 - 20 mg/dL    Creatinine 1.33 (H) 0.55 - 1.02 mg/dL    BUN/Creatinine ratio 63 (H) 12 - 20      GFR est AA 47 (L) >60 ml/min/1.73m2    GFR est non-AA 39 (L) >60 ml/min/1.73m2    Calcium 8.3 (L) 8.5 - 10.1 mg/dL    Bilirubin, total 0.2 0.2 - 1.0 mg/dL    AST (SGOT) 24 15 - 37 U/L    ALT (SGPT) 40 12 - 78 U/L    Alk. phosphatase 99 45 - 117 U/L    Protein, total 6.3 (L) 6.4 - 8.2 g/dL    Albumin 1.7 (L) 3.5 - 5.0 g/dL    Globulin 4.6 (H) 2.0 - 4.0 g/dL    A-G Ratio 0.4 (L) 1.1 - 2.2     CBC WITH AUTOMATED DIFF    Collection Time: 04/12/22  7:11 AM   Result Value Ref Range    WBC 18.9 (H) 3.6 - 11.0 K/uL    RBC 4.03 3.80 - 5.20 M/uL    HGB 11.2 (L) 11.5 - 16.0 g/dL    HCT 37.8 35.0 - 47.0 %    MCV 93.8 80.0 - 99.0 FL    MCH 27.8 26.0 - 34.0 PG    MCHC 29.6 (L) 30.0 - 36.5 g/dL    RDW 17.1 (H) 11.5 - 14.5 %    PLATELET 211 570 - 358 K/uL    MPV 11.6 8.9 - 12.9 FL    NRBC 0.0 0.0  WBC    ABSOLUTE NRBC 0.00 0.00 - 0.01 K/uL    NEUTROPHILS 89 (H) 32 - 75 %    LYMPHOCYTES 7 (L) 12 - 49 %    MONOCYTES 3 (L) 5 - 13 %    EOSINOPHILS 0 0 - 7 %    BASOPHILS 0 0 - 1 %    IMMATURE GRANULOCYTES 1 (H) 0 - 0.5 %    ABS. NEUTROPHILS 16.9 (H) 1.8 - 8.0 K/UL    ABS. LYMPHOCYTES 1.3 0.8 - 3.5 K/UL    ABS. MONOCYTES 0.5 0.0 - 1.0 K/UL    ABS. EOSINOPHILS 0.0 0.0 - 0.4 K/UL    ABS. BASOPHILS 0.0 0.0 - 0.1 K/UL    ABS. IMM.  GRANS. 0.1 (H) 0.00 - 0.04 K/UL    DF AUTOMATED     C REACTIVE PROTEIN, QT    Collection Time: 04/12/22  7:11 AM   Result Value Ref Range    C-Reactive protein 2.33 (H) 0.00 - 0.60 mg/dL   PROCALCITONIN    Collection Time: 04/12/22  7:11 AM   Result Value Ref Range    Procalcitonin 1.93 (H) 0 ng/mL   VANCOMYCIN, RANDOM    Collection Time: 04/12/22  7:11 AM   Result Value Ref Range    Vancomycin, random 20.6 ug/mL    Reported dose date Not provided      Reported dose: Not provided Units   GLUCOSE, POC    Collection Time: 04/12/22  9:37 AM   Result Value Ref Range    Glucose (POC) 359 (H) 65 - 117 mg/dL    Performed by [facility])    GLUCOSE, POC    Collection Time: 04/12/22 11:20 AM   Result Value Ref Range    Glucose (POC) 348 (H) 65 - 117 mg/dL    Performed by Paulo Spence          Imaging:    XR CHEST PORT   Final Result   No acute pulmonary process. XR CHEST PORT   Final Result   No significant change. XR CHEST PORT   Final Result      XR CHEST PORT   Final Result   No acute cardiopulmonary abnormality. .       XR CHEST PORT   Final Result      CT HEAD WO CONT   Final Result   Chronic changes which appear stable. No acute or active intracranial process. Chronic paranasal sinus disease         XR CHEST PORT   Final Result   No acute findings. Assessment   Severe sepsis with UTI  UTI/Pseudomonas  Acute on chronic kidney disease  Hyperkalemia  Hypernatremia  CVA with right-sided weakness  Anemia of chronic disease  Gangrene of the left great toe  Sacral decubitus ulcer  Acute respiratory failure  Possible aspiration  Elevated BNP    Plan     Amlodipine 5 mg daily  Aspirin 81 mg daily  Lipitor 20 daily  Ferrous sulfate 300 twice a day  Flucanazole 200 mg daily D /C  Gabapentin 300 mg at bedtime  Heparin 5000 units subcu every 8 hours  Hydralazine 50 mg 3 times a day  Lantus 22 units subcu twice a day  Metoprolol 50 mg twice a day  IV Zosyn 3.375 IV every 8 hours  Vancomycin per pharmacy protocol      Monitor renal function and sodium level      Pulmonary nephrology infectious disease and vascular surgical.  Follow-up with the vascular surgeon regarding amputation of the left great toe    Start PEG tube feeding with water flush   Start back on gabapentin at night only    Blood sugars running high secondary D5 at 75 cc/h as per nephrology  Transfer telemetry floor    Discussed with the patient's daughter at the bedside      Current Facility-Administered Medications:     [START ON 4/13/2022] Vancomycin random level - please draw with AM labs.  Thanks!, , Other, Christine Khan MD    gabapentin (NEURONTIN) capsule 300 mg, 300 mg, Oral, QHS, Israel Soto MD, 300 mg at 04/11/22 2339    amLODIPine (NORVASC) tablet 5 mg, 5 mg, Oral, DAILY, Israel Soto MD, 5 mg at 04/12/22 1001    metoprolol tartrate (LOPRESSOR) tablet 50 mg, 50 mg, Oral, BID, Israel Soto MD, 50 mg at 04/12/22 1001    nitroglycerin (NITROBID) 2 % ointment 1 Inch, 1 Inch, Topical, Q6H, Donnie Mckay MD, 1 Inch at 04/12/22 1204    hydrALAZINE (APRESOLINE) tablet 50 mg, 50 mg, Oral, TID, Monserrat Loco MD, 50 mg at 04/12/22 1001    dextrose 5% infusion, 75 mL/hr, IntraVENous, CONTINUOUS, Javon Berry MD, Last Rate: 75 mL/hr at 04/12/22 0518, 75 mL/hr at 04/12/22 0518    [Held by provider] furosemide (LASIX) injection 40 mg, 40 mg, IntraVENous, DAILY, Monserrat Loco MD, 40 mg at 04/09/22 1054    VANCOMYCIN INFORMATION NOTE, , Other, Rx Dosing/Monitoring, Kyra Ahmadi MD    insulin glargine (LANTUS) injection 22 Units, 22 Units, SubCUTAneous, BID, Israel Soto MD, 22 Units at 04/12/22 0900    labetaloL (NORMODYNE;TRANDATE) 20 mg/4 mL (5 mg/mL) injection 20 mg, 20 mg, IntraVENous, Q4H PRN, Monserrat Loco MD, 20 mg at 04/10/22 2017    hydrALAZINE (APRESOLINE) 20 mg/mL injection 20 mg, 20 mg, IntraVENous, Q6H PRN, Monserrat Loco MD, 20 mg at 04/12/22 0257    0.9% sodium chloride infusion 250 mL, 250 mL, IntraVENous, PRN, Martínez Crespo MD    0.9% sodium chloride infusion 250 mL, 250 mL, IntraVENous, PRN, Maranda Soto MD    insulin lispro (HUMALOG) injection, , SubCUTAneous, Q6H, Israel Soto MD, 10 Units at 04/12/22 1200    atorvastatin (LIPITOR) tablet 20 mg, 20 mg, Oral, QHS, Israel Soto MD, 20 mg at 04/11/22 2339    latanoprost (XALATAN) 0.005 % ophthalmic solution 1 Drop, 1 Drop, Right Eye, QPM, Israel Soto MD, 1 Drop at 04/09/22 1826    aspirin delayed-release tablet 81 mg, 81 mg, Oral, DAILY, Brittany, Catrina Romero MD, 81 mg at 04/12/22 1001    brimonidine (ALPHAGAN) 0.2 % ophthalmic solution 1 Drop, 1 Drop, Both Eyes, TID, Israel Soto MD, 1 Drop at 04/12/22 0900    glucose chewable tablet 16 g, 4 Tablet, Oral, PRN, Catrina Soto MD    glucagon [facility]) injection 1 mg, 1 mg, IntraMUSCular, PRN, MD Chaim Millsny Safe  [DISCONTINUED] acetaminophen (TYLENOL) tablet 650 mg, 650 mg, Oral, Q6H PRN, 650 mg at 04/04/22 2246 **OR** acetaminophen (TYLENOL) suppository 650 mg, 650 mg, Rectal, Q6H PRN, Catrina Soto MD    polyethylene glycol (MIRALAX) packet 17 g, 17 g, Oral, DAILY PRN, Catrina Soto MD    ondansetron (ZOFRAN ODT) tablet 4 mg, 4 mg, Oral, Q8H PRN **OR** ondansetron (ZOFRAN) injection 4 mg, 4 mg, IntraVENous, Q6H PRN, Israel Soto MD    heparin (porcine) injection 5,000 Units, 5,000 Units, SubCUTAneous, Q8H, Israel Soto MD, 5,000 Units at 04/12/22 0609    piperacillin-tazobactam (ZOSYN) 3.375 g in 0.9% sodium chloride (MBP/ADV) 100 mL MBP, 3.375 g, IntraVENous, Q8H, Israel Soto MD, Last Rate: 25 mL/hr at 04/12/22 1001, 3.375 g at 04/12/22 1001    albuterol-ipratropium (DUO-NEB) 2.5 MG-0.5 MG/3 ML, 3 mL, Nebulization, Q6H PRN, Catrina Soto MD    ferrous sulfate 300 mg (60 mg iron)/5 mL oral syrup 300 mg, 300 mg, Oral, BID, Israel Soto MD, 300 mg at 04/12/22 1001    acetaminophen (TYLENOL) solution 650 mg, 650 mg, Per NG tube, Q6H PRN, Israel Soto MD, 650 mg at 04/09/22 6895    Current Outpatient Medications   Medication Instructions    acidophilus-pectin, citrus 25 million cell -100 mg tab tablet 2 Tablets, Oral, DAILY    amLODIPine (NORVASC) 5 mg, Oral, DAILY    artificial saliva (MOUTH KOTE) spra 1 Spray, Oral, 3 TIMES DAILY    aspirin delayed-release 81 mg tablet Oral, DAILY    atorvastatin (LIPITOR) 20 mg tablet TAKE 1 TABLET BY MOUTH EVERY NIGHT    brimonidine (ALPHAGAN) 0.2 % ophthalmic solution 1 Drop, Both Eyes, 3 TIMES DAILY    ferrous sulfate 325 mg, Oral, 2 TIMES DAILY    gabapentin (NEURONTIN) 300 mg, Oral, 2 TIMES DAILY    insulin glargine (LANTUS) 50 Units, SubCUTAneous, DAILY    latanoprost (XALATAN) 0.005 % ophthalmic solution 1 Drop, Right Eye, EVERY EVENING    lidocaine (XYLOCAINE) 4 % (40 mg/mL) topical solution 1 mL, Topical, AS NEEDED    metoprolol succinate (TOPROL-XL) 50 mg, Oral, 2 TIMES DAILY         Signed By: Anita Baker MD     April 12, 2022

## 2022-04-12 NOTE — PROGRESS NOTES
Spiritual Care Assessment/Progress Note  [facility]      NAME: Malinda Rosado      MRN: [id number]  AGE: 78 y.o. SEX: female  Episcopalian Affiliation: Denominational   Language: English     4/12/2022     Total Time (in minutes): 8     Spiritual Assessment begun in [facility] through conversation with:         [x]Patient        [x] Family    [] Friend(s)        Reason for Consult: Initial visit     Spiritual beliefs: (Please include comment if needed)     [x] Identifies with a lesley tradition:  Denominational       [] Supported by a lesley community:            [] Claims no spiritual orientation:           [] Seeking spiritual identity:                [] Adheres to an individual form of spirituality:           [] Not able to assess:                           Identified resources for coping:      [x] Prayer                               [] Music                  [] Guided Imagery     [x] Family/friends                 [] Pet visits     [] Devotional reading                         [] Unknown     [] Other:                                               Interventions offered during this visit: (See comments for more details)    Patient Interventions: Prayer (actual),Other (comment) (Silent support)     Family/Friend(s):  Affirmation of emotions/emotional suffering,Affirmation of lesley,Bridging,Catharsis/review of pertinent events in supportive environment,Coping skills reviewed/reinforced,Initial Assessment,Iconic (affirming the presence of God/Higher Power),Life review/legacy,Normalization of emotional/spiritual concerns,Prayer (actual),Episcopalian beliefs/image of God discussed     Plan of Care:     [] Support spiritual and/or cultural needs    [] Support AMD and/or advance care planning process      [] Support grieving process   [] Coordinate Rites and/or Rituals    [] Coordination with community clergy   [] No spiritual needs identified at this time   [] Detailed Plan of Care below (See Comments)  [] Make referral to Music Therapy  [] Make referral to Pet Therapy     [] Make referral to Addiction services  [] Make referral to [facility]  [] Make referral to Spiritual Care Partner  [] No future visits requested        [x] Contact Spiritual Care for further referrals     Comments:  Visited patient in [place] [address] for initial assessment. Patient and daughter Martha Jimenes were present during the visit. Patient seemed to be resting and was non-verbal during the visit. Maryse shared about mother's medical condition and her hopes of future improvement. Shared about big family presence in the area and her taking break from her home Confucianism since her father's death. Expressed her continued relationship with God and prayer life. Provided chaplaincy education, listened with empathy and reflection while exploring her needs and resources. Normalized her emotions and concerns as well as affirmed her familial and spiritual support at this time. Offered and provided prayer per her request and advised of  availability. Contact chaplains for further referrals. Chaplain Marisa Rodriguez M.Div.    can be reached by calling the  at [facility]  [phone]

## 2022-04-12 NOTE — PROGRESS NOTES
Vancomycin Dosing Consult  Day #5 of vancomycin therapy  Consult ordered by Dr. Torri Chaudhary for this 78 y.o. female for indication of sepsis, UTI.   Antibiotic regimen: Vancomycin + zosyn    Temp (24hrs), Av.4 °F (36.9 °C), Min:97.3 °F (36.3 °C), Max:98.9 °F (37.2 °C)    Recent Labs     22  0711 22  0430 04/10/22  0338   WBC 18.9* 22.3* 26.8*   CREA 1.33* 1.55* 1.87*   BUN 84* 94* 92*     Est CrCl: ~42 ml/min  Concomitant nephrotoxic drugs: Loop diuretics    Cultures:   4/3 blood: ngtd - Final   urine: Pseudomonas aeruginosa - Final  4/10 blood: ngtd - prelim    MRSA Swab: Not detected - Final    Target range: Trough 10-15 mcg/mL    Assessment/Plan:   Renal function improving, however, still not at baseline  --RICARDO still present  Procal elevated, trending up since yesterday  CRP elevated, trending down  WBC elevated but trending down  Afebrile x 24 hours  Consider de-escalating from vancomycin if no suspicion for MRSA  Patient is not clearing vancomycin well  Will hold dose today, and get random level tomorrow AM  Antimicrobial stop date TBD

## 2022-04-12 NOTE — PROGRESS NOTES
PROGRESS NOTE    Patient: Jessenia Hudson MRN: [id number]  SSN: [id number]    YOB: 1945  Age: 78 y.o. Sex: female      Admit Date: 4/3/2022    LOS: 8 days       Subjective     Chief Complaint   Patient presents with    Altered mental status       HPI: Patient is a 78y.o. 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 04/05   Patient is seen at bedside with daughter and nephew today. Patient is in distress due to pain and daughter notes that patient gets Neurontin TID for neuropathy daily. Otherwise, patient is still receiving enteric feeding through PEG tube and getting IVF. Patient was seen by nephrology yesterday. Recommends obtaining renal panel and continuing IVF. Patient was seen by ID yesterday. Recommends continuing IV vancomycin for urosepsis. Patient was seen by pulmonology today. Recommends PRBC transfusion. Labs indicate WBC 16.9, Hgb 6.3, Na 148, Cl 120, BUN 72, Cr 1.71, Ca 8, CRP 28.4, pro-carrie 6.93 and .       04/06   Patient is seen at bedside. Patient received 2 units of PRBC. Patient is on Zosyn. No new changes today. Patient seen by the vascular surgeon he recommend great toe amputation  And for sacral wound recommend Medihoney ointment     Labs show increasing Hgb of 9, decreasing WBC 14.7, Na 147, , BUN 55,   Cr 1.47, and CRP 21.4. CXR is unremarkable. 04/07  Patient is seen resting at bedside.  Patient received one unit of PRBC with increased Hgb of 10.6. No acute changes today. Patient is seen by nephrology who recommends free water flushes to correct hypernatremia. Labs show increasing Hgb of 10.6, WBC 15.1, pro-calcitonin 3.78, and CRP 16.1.    4/8    Patient had a rapid response this morning ABG and chest x-ray was ordered  Patient tachycardic tachypneic  ABG  pH 7.51 PCO2 31 PO2 65 bicarb 26 oxygen saturation 95% on room air    Chest x-ray shows no much changes    4/9    Patient resting the bed open eyes  On room air/breathing through the mouth    4/10    Patient open eyes not in distress breathing through her mouth  According to the nurse patient not sleep all day and all night yesterday      4/11  Patient is seen at bedside. She is awake but not in any distress. Patient is on zosyn. PEG tube in place. Urine culture positive for Pseudomonas. CXR shows no focal changes. Labs remarkable for WBC  22.3, Hgb 10.5, Na 153, , Cr 1.55 BUN 94, pro-calcitonin 1.64, and CRP 3.33.    4/12  Patient is on Vancomycin, zosyn and D5W. PEG tube in place. Spoke to daughter at bedside. She has no new complaints. Labs remarkable for:  WBC  18.9,  Cr 1.33, BUN 84,  and CRP 2.33 which are decreasing. Hgb 11.2, Na-corrected 158, , and pro-calcitonin 1.93 which are increasing. Seen by pulmonology. No new recommendations. Seen by GI. No new recommendations. Seen by nephrology. recommends IV D5W IVF to decrease hypernatremia. Seen by ID. Recommends discontinuing fluconazole, follow up blood cultures, and continuing IV zosyn and vancomycin. Review of Systems   Unable to perform ROS: Acuity of condition     Unable to obtain.      Objective     Visit Vitals  BP (!) 166/60 (BP 1 Location: Right upper arm, BP Patient Position: At rest;Semi fowlers)   Pulse 84   Temp 98.9 °F (37.2 °C)   Resp 20   Ht 5' 8\" (1.727 m)   Wt 191 lb (86.6 kg)   SpO2 100%   BMI 29.04 kg/m²    O2 Flow Rate (L/min): 0 l/min O2 Device: None (Room air)    Physical Exam:   Physical Exam  Constitutional: Open eyes do not follow any commands. Tracks with eyes. HENT:   Head: Normocephalic and atraumatic. Eyes: Pupils are equal, round, and reactive to light. EOM are normal.   Cardiovascular: Normal rate, regular rhythm and normal heart sounds. Pulmonary/Chest: Decreased breath sound   abdominal: Soft. Bowel sounds are normal. There is no abdominal tenderness. There is no rebound and no guarding. Musculoskeletal: Normal range of motion. Neurological: Open eyes do not follow any, right-sided weakness  Intake & Output:  Current Shift: 04/12 0701 - 04/12 1900  In: -   Out: 350 [Drains:350]  Last three shifts: 04/10 1901 - 04/12 0700  In: 2967 [I.V.:1100]  Out: 1937 [facility] [Urine:2375]    Lab/Data Review: All lab results for the last 24 hours reviewed.        24 Hour Results:    Recent Results (from the past 24 hour(s))   GLUCOSE, POC    Collection Time: 04/11/22 11:37 AM   Result Value Ref Range    Glucose (POC) 292 (H) 65 - 117 mg/dL    Performed by Modesta Lei, RANDOM    Collection Time: 04/11/22  3:41 PM   Result Value Ref Range    Vancomycin, random 16.9 ug/mL   GLUCOSE, POC    Collection Time: 04/11/22  4:35 PM   Result Value Ref Range    Glucose (POC) 330 (H) 65 - 117 mg/dL    Performed by Jass Esteves    GLUCOSE, POC    Collection Time: 04/11/22  7:59 PM   Result Value Ref Range    Glucose (POC) 305 (H) 65 - 117 mg/dL    Performed by Manohar Chou    GLUCOSE, POC    Collection Time: 04/11/22 11:53 PM   Result Value Ref Range    Glucose (POC) 340 (H) 65 - 117 mg/dL    Performed by Monroe Mosley ([place])    GLUCOSE, POC    Collection Time: 04/12/22  5:53 AM   Result Value Ref Range    Glucose (POC) 348 (H) 65 - 117 mg/dL    Performed by Monroe Mosley ([place])    METABOLIC PANEL, COMPREHENSIVE    Collection Time: 04/12/22  7:11 AM   Result Value Ref Range    Sodium 156 (H) 136 - 145 mmol/L Potassium 4.4 3.5 - 5.1 mmol/L    Chloride 126 (H) 97 - 108 mmol/L    CO2 28 21 - 32 mmol/L    Anion gap 2 (L) 5 - 15 mmol/L    Glucose 369 (H) 65 - 100 mg/dL    BUN 84 (H) 6 - 20 mg/dL    Creatinine 1.33 (H) 0.55 - 1.02 mg/dL    BUN/Creatinine ratio 63 (H) 12 - 20      GFR est AA 47 (L) >60 ml/min/1.73m2    GFR est non-AA 39 (L) >60 ml/min/1.73m2    Calcium 8.3 (L) 8.5 - 10.1 mg/dL    Bilirubin, total 0.2 0.2 - 1.0 mg/dL    AST (SGOT) 24 15 - 37 U/L    ALT (SGPT) 40 12 - 78 U/L    Alk. phosphatase 99 45 - 117 U/L    Protein, total 6.3 (L) 6.4 - 8.2 g/dL    Albumin 1.7 (L) 3.5 - 5.0 g/dL    Globulin 4.6 (H) 2.0 - 4.0 g/dL    A-G Ratio 0.4 (L) 1.1 - 2.2     CBC WITH AUTOMATED DIFF    Collection Time: 04/12/22  7:11 AM   Result Value Ref Range    WBC 18.9 (H) 3.6 - 11.0 K/uL    RBC 4.03 3.80 - 5.20 M/uL    HGB 11.2 (L) 11.5 - 16.0 g/dL    HCT 37.8 35.0 - 47.0 %    MCV 93.8 80.0 - 99.0 FL    MCH 27.8 26.0 - 34.0 PG    MCHC 29.6 (L) 30.0 - 36.5 g/dL    RDW 17.1 (H) 11.5 - 14.5 %    PLATELET 308 467 - 706 K/uL    MPV 11.6 8.9 - 12.9 FL    NRBC 0.0 0.0  WBC    ABSOLUTE NRBC 0.00 0.00 - 0.01 K/uL    NEUTROPHILS 89 (H) 32 - 75 %    LYMPHOCYTES 7 (L) 12 - 49 %    MONOCYTES 3 (L) 5 - 13 %    EOSINOPHILS 0 0 - 7 %    BASOPHILS 0 0 - 1 %    IMMATURE GRANULOCYTES 1 (H) 0 - 0.5 %    ABS. NEUTROPHILS 16.9 (H) 1.8 - 8.0 K/UL    ABS. LYMPHOCYTES 1.3 0.8 - 3.5 K/UL    ABS. MONOCYTES 0.5 0.0 - 1.0 K/UL    ABS. EOSINOPHILS 0.0 0.0 - 0.4 K/UL    ABS. BASOPHILS 0.0 0.0 - 0.1 K/UL    ABS. IMM.  GRANS. 0.1 (H) 0.00 - 0.04 K/UL    DF AUTOMATED     C REACTIVE PROTEIN, QT    Collection Time: 04/12/22  7:11 AM   Result Value Ref Range    C-Reactive protein 2.33 (H) 0.00 - 0.60 mg/dL   PROCALCITONIN    Collection Time: 04/12/22  7:11 AM   Result Value Ref Range    Procalcitonin 1.93 (H) 0 ng/mL   VANCOMYCIN, RANDOM    Collection Time: 04/12/22  7:11 AM   Result Value Ref Range    Vancomycin, random 20.6 ug/mL    Reported dose date Not provided      Reported dose: Not provided Units         Imaging:    XR CHEST PORT   Final Result   No acute pulmonary process. XR CHEST PORT   Final Result   No significant change. XR CHEST PORT   Final Result      XR CHEST PORT   Final Result   No acute cardiopulmonary abnormality. .       XR CHEST PORT   Final Result      CT HEAD WO CONT   Final Result   Chronic changes which appear stable. No acute or active intracranial process. Chronic paranasal sinus disease         XR CHEST PORT   Final Result   No acute findings.              Assessment   Severe sepsis with UTI  UTI/Pseudomonas  Acute on chronic kidney disease  Hyperkalemia  Hypernatremia  CVA with right-sided weakness  Anemia of chronic disease  Gangrene of the left great toe  Sacral decubitus ulcer  Acute respiratory failure  Possible aspiration  Elevated BNP    Plan     Amlodipine 5 mg daily  Aspirin 81 mg daily  Lipitor 20 daily  Ferrous sulfate 300 twice a day  Flucanazole 200 mg daily  Gabapentin 300 mg at bedtime  Heparin 5000 units subcu every 8 hours  Hydralazine 50 mg 3 times a day  Lantus 22 units subcu twice a day  Metoprolol 50 mg twice a day  IV Zosyn 3.375 IV every 8 hours  Vancomycin per pharmacy protocol      Monitor renal function and sodium level      Pulmonary nephrology infectious disease and vascular surgical.  Follow-up with the vascular surgeon regarding amputation of the left great toe    Start PEG tube feeding with water flush   Start back on gabapentin at night only    Blood sugars running high secondary D5 at 75 cc/h as per nephrology  Transfer telemetry floor      Current Facility-Administered Medications:     gabapentin (NEURONTIN) capsule 300 mg, 300 mg, Oral, QHS, Israel Soto MD, 300 mg at 04/11/22 2339    amLODIPine (NORVASC) tablet 5 mg, 5 mg, Oral, DAILY, Israel Soto MD, 5 mg at 04/11/22 0936    metoprolol tartrate (LOPRESSOR) tablet 50 mg, 50 mg, Oral, BID, Israel Soto MD, 50 mg at 04/11/22 2339    nitroglycerin (NITROBID) 2 % ointment 1 Inch, 1 Inch, Topical, Q6H, Kandy Berry MD, 1 Inch at 04/12/22 0610    hydrALAZINE (APRESOLINE) tablet 50 mg, 50 mg, Oral, TID, Israel Soto MD, 50 mg at 04/11/22 2339    dextrose 5% infusion, 75 mL/hr, IntraVENous, CONTINUOUS, Kandy Berry MD, Last Rate: 75 mL/hr at 04/12/22 0518, 75 mL/hr at 04/12/22 0518    [Held by provider] furosemide (LASIX) injection 40 mg, 40 mg, IntraVENous, DAILY, Israel Soto MD, 40 mg at 04/09/22 1054    VANCOMYCIN INFORMATION NOTE, , Other, Rx Dosing/Monitoring, Adina Smart MD    insulin glargine (LANTUS) injection 22 Units, 22 Units, SubCUTAneous, BID, Israel Soto MD, 22 Units at 04/11/22 2339    labetaloL (NORMODYNE;TRANDATE) 20 mg/4 mL (5 mg/mL) injection 20 mg, 20 mg, IntraVENous, Q4H PRN, Yudy Price MD, 20 mg at 04/10/22 2017    hydrALAZINE (APRESOLINE) 20 mg/mL injection 20 mg, 20 mg, IntraVENous, Q6H PRN, Yudy Price MD, 20 mg at 04/12/22 0257    0.9% sodium chloride infusion 250 mL, 250 mL, IntraVENous, PRN, Martínez Crespo MD    0.9% sodium chloride infusion 250 mL, 250 mL, IntraVENous, PRN, Foreign Soto MD    insulin lispro (HUMALOG) injection, , SubCUTAneous, Q6H, Israel Soto MD, 10 Units at 04/12/22 0610    atorvastatin (LIPITOR) tablet 20 mg, 20 mg, Oral, QHS, Israel Soto MD, 20 mg at 04/11/22 2339    latanoprost (XALATAN) 0.005 % ophthalmic solution 1 Drop, 1 Drop, Right Eye, QPM, Israel Soto MD, 1 Drop at 04/09/22 1826    aspirin delayed-release tablet 81 mg, 81 mg, Oral, DAILY, Israel Soto MD, 81 mg at 04/11/22 0937    brimonidine (ALPHAGAN) 0.2 % ophthalmic solution 1 Drop, 1 Drop, Both Eyes, TID, Israel Soto MD, 1 Drop at 04/11/22 5786    glucose chewable tablet 16 g, 4 Tablet, Oral, PRN, Israel Soto MD    glucagon (GLUCAGEN) injection 1 mg, 1 mg, IntraMUSCular, PRN, VerMD Raiza Dai [DISCONTINUED] acetaminophen (TYLENOL) tablet 650 mg, 650 mg, Oral, Q6H PRN, 650 mg at 04/04/22 2246 **OR** acetaminophen (TYLENOL) suppository 650 mg, 650 mg, Rectal, Q6H PRN, Dima Soto MD    polyethylene glycol (MIRALAX) packet 17 g, 17 g, Oral, DAILY PRN, Dima Soto MD    ondansetron (ZOFRAN ODT) tablet 4 mg, 4 mg, Oral, Q8H PRN **OR** ondansetron (ZOFRAN) injection 4 mg, 4 mg, IntraVENous, Q6H PRN, Israel Soto MD    heparin (porcine) injection 5,000 Units, 5,000 Units, SubCUTAneous, Q8H, Israel Soto MD, 5,000 Units at 04/12/22 0609    piperacillin-tazobactam (ZOSYN) 3.375 g in 0.9% sodium chloride (MBP/ADV) 100 mL MBP, 3.375 g, IntraVENous, Q8H, Israel Soto MD, Last Rate: 25 mL/hr at 04/12/22 0251, 3.375 g at 04/12/22 0251    albuterol-ipratropium (DUO-NEB) 2.5 MG-0.5 MG/3 ML, 3 mL, Nebulization, Q6H PRN, Dima Soto MD    ferrous sulfate 300 mg (60 mg iron)/5 mL oral syrup 300 mg, 300 mg, Oral, BID, Israel Soto MD, 300 mg at 04/11/22 2338    acetaminophen (TYLENOL) solution 650 mg, 650 mg, Per NG tube, Q6H PRN, Israel Soto MD, 650 mg at 04/09/22 2325    Current Outpatient Medications   Medication Instructions    acidophilus-pectin, citrus 25 million cell -100 mg tab tablet 2 Tablets, Oral, DAILY    amLODIPine (NORVASC) 5 mg, Oral, DAILY    artificial saliva (MOUTH KOTE) spra 1 Spray, Oral, 3 TIMES DAILY    aspirin delayed-release 81 mg tablet Oral, DAILY    atorvastatin (LIPITOR) 20 mg tablet TAKE 1 TABLET BY MOUTH EVERY NIGHT    brimonidine (ALPHAGAN) 0.2 % ophthalmic solution 1 Drop, Both Eyes, 3 TIMES DAILY    ferrous sulfate 325 mg, Oral, 2 TIMES DAILY    gabapentin (NEURONTIN) 300 mg, Oral, 2 TIMES DAILY    insulin glargine (LANTUS) 50 Units, SubCUTAneous, DAILY    latanoprost (XALATAN) 0.005 % ophthalmic solution 1 Drop, Right Eye, EVERY EVENING    lidocaine (XYLOCAINE) 4 % (40 mg/mL) topical solution 1 mL, Topical, AS NEEDED    metoprolol succinate (TOPROL-XL) 50 mg, Oral, 2 TIMES DAILY         Signed By: Runell Apley     April 12, 2022

## 2022-04-12 NOTE — PROGRESS NOTES
Renal Daily Progress Note    Admit Date: 4/3/2022      Subjective:   No history from the patient. Does not follow commands. Current Facility-Administered Medications   Medication Dose Route Frequency    [START ON 4/13/2022] Vancomycin random level - please draw with AM labs. Thanks!    Other ONCE    insulin glargine (LANTUS) injection 25 Units  25 Units SubCUTAneous BID    artificial saliva (MOUTH KOTE) 1 Spray  1 Spray Oral PRN    gabapentin (NEURONTIN) capsule 300 mg  300 mg Oral QHS    amLODIPine (NORVASC) tablet 5 mg  5 mg Oral DAILY    metoprolol tartrate (LOPRESSOR) tablet 50 mg  50 mg Oral BID    nitroglycerin (NITROBID) 2 % ointment 1 Inch  1 Inch Topical Q6H    hydrALAZINE (APRESOLINE) tablet 50 mg  50 mg Oral TID    dextrose 5% infusion  100 mL/hr IntraVENous CONTINUOUS    [Held by provider] furosemide (LASIX) injection 40 mg  40 mg IntraVENous DAILY    VANCOMYCIN INFORMATION NOTE   Other Rx Dosing/Monitoring    labetaloL (NORMODYNE;TRANDATE) 20 mg/4 mL (5 mg/mL) injection 20 mg  20 mg IntraVENous Q4H PRN    hydrALAZINE (APRESOLINE) 20 mg/mL injection 20 mg  20 mg IntraVENous Q6H PRN    0.9% sodium chloride infusion 250 mL  250 mL IntraVENous PRN    0.9% sodium chloride infusion 250 mL  250 mL IntraVENous PRN    insulin lispro (HUMALOG) injection   SubCUTAneous Q6H    atorvastatin (LIPITOR) tablet 20 mg  20 mg Oral QHS    latanoprost (XALATAN) 0.005 % ophthalmic solution 1 Drop  1 Drop Right Eye QPM    aspirin delayed-release tablet 81 mg  81 mg Oral DAILY    brimonidine (ALPHAGAN) 0.2 % ophthalmic solution 1 Drop  1 Drop Both Eyes TID    glucose chewable tablet 16 g  4 Tablet Oral PRN    glucagon (GLUCAGEN) injection 1 mg  1 mg IntraMUSCular PRN    acetaminophen (TYLENOL) suppository 650 mg  650 mg Rectal Q6H PRN    polyethylene glycol (MIRALAX) packet 17 g  17 g Oral DAILY PRN    ondansetron (ZOFRAN ODT) tablet 4 mg  4 mg Oral Q8H PRN    Or    ondansetron (ZOFRAN) injection 4 mg  4 mg IntraVENous Q6H PRN    heparin (porcine) injection 5,000 Units  5,000 Units SubCUTAneous Q8H    piperacillin-tazobactam (ZOSYN) 3.375 g in 0.9% sodium chloride (MBP/ADV) 100 mL MBP  3.375 g IntraVENous Q8H    albuterol-ipratropium (DUO-NEB) 2.5 MG-0.5 MG/3 ML  3 mL Nebulization Q6H PRN    ferrous sulfate 300 mg (60 mg iron)/5 mL oral syrup 300 mg  300 mg Oral BID    acetaminophen (TYLENOL) solution 650 mg  650 mg Per NG tube Q6H PRN        Review of Systems    ROS   Obtainable  Objective:     Patient Vitals for the past 8 hrs:   BP Temp Pulse Resp SpO2   04/12/22 0724 (!) 166/60 98.9 °F (37.2 °C) 84 20 100 %     04/12 0701 - 04/12 1900  In: 220   Out: 2796 [Urine:1300; Drains:350]  04/10 1901 - 04/12 0700  In: 2967 [I.V.:1100]  Out: 2375 [Urine:2375]    Physical Exam:   Physical Exam  Cardiovascular:      Rate and Rhythm: Regular rhythm. Pulmonary:      Breath sounds: Normal breath sounds. Abdominal:      General: Bowel sounds are normal.      Palpations: Abdomen is soft. Comments: On PEG feeding   Skin:     General: Skin is warm. Absent toes on the right foot        XR CHEST PORT   Final Result   No acute pulmonary process. XR CHEST PORT   Final Result   No significant change. XR CHEST PORT   Final Result      XR CHEST PORT   Final Result   No acute cardiopulmonary abnormality. .       XR CHEST PORT   Final Result      CT HEAD WO CONT   Final Result   Chronic changes which appear stable. No acute or active intracranial process. Chronic paranasal sinus disease         XR CHEST PORT   Final Result   No acute findings.            Data Review   Recent Labs     04/12/22  0711 04/11/22  0430 04/10/22  0338   WBC 18.9* 22.3* 26.8*   HGB 11.2* 10.5* 12.1   HCT 37.8 35.5 40.4    368 430*     Recent Labs     04/12/22  0711 04/11/22  0430 04/10/22  0338   * 153* 155*   K 4.4 4.1 4.7   * 123* 123*   CO2 28 24 22   * 288* 362*   BUN 84* 94* 92*   CREA 1.33* 1.55* 1.87*   CA 8.3* 8.5 8.7   ALB 1.7* 1.6* 1.8*   ALT 40 42 56     No components found for: GLPOC  Recent Labs     04/11/22  0210   PH 7.37   PCO2 40   PO2 84   HCO3 23   FIO2 21     No results for input(s): INR, INREXT, INREXT, INREXT in the last 72 hours. Assessment:           Active Problems:    Hyperkalemia (4/4/2022)      UTI (urinary tract infection) (4/4/2022)      Sepsis ([facility] [address] (4/4/2022)      RICARDO (acute kidney injury) ([facility] [address] (4/4/2022)      AMS (altered mental status) (4/4/2022)    Acute kidney injury on CKD. Creatinine down to 1.33 mg today. This should place her in stage III A    Hypernatremia with a free water deficit of 5 L. Leukocytosis improving    CVA    Pseudomonas aeruginosa urinary tract infection    Diabetes mellitus uncontrolled which is worsening the hyponatremia due to obligatory water loss. Plan:   Water flushes via the PEG. Orders to flush with 225 mL of water every 4 hours. She is however receiving 175 mL every 4 hours. Increase IV D5W to 100 mL/h which should give her 2.4L of free water. She should receive 1300 mL of water via the PEG as well. Increase glargine to 25 units twice daily to correct hyperglycemia. Once the D5W is discontinued, the dose of glargine will have to be decreased.

## 2022-04-12 NOTE — PROGRESS NOTES
Progress Note    Patient: Sammie Burgess MRN: [id number]  SSN: [id number]    YOB: 1945  Age: 78 y.o. Sex: female      Admit Date: 4/3/2022    LOS: 8 days     Subjective:   GI in consultation for peg tube malfunction    4/12: Patient seen resting comfortably, No signs of distress noted. Glucerna infusing at 60 ml/hr via peg tube without difficulty. Peg tube site without redness or drainage. Peg tube working without difficulty. GI is signing off at this time. Please re-consult if needed. History of Present Illness: Sammie Burgess is a 78 y.o. female who is seen in consultation for peg tube malfunction. The patient has a past medical history significant for CVA with peg tube placement, right side weakness, Left great toe gangrene,hypertension, neuropathy CKD stage 3b, type 2 Diabetes, bedridden. Ms Vinicio German was found with altered mental status per SNF staff. She does have sacral wounds and severe peripheral vascular disease. She is followed by Vascular surgery. She is a poor historian and majority of medical history. obtained form medical records and nursing staff. CT of the head on arrival shows chronic changes with no acute intracranial process and chronic paranasal sinus disease. Urinalysis shows pyuria and bacteria. She is on IV zosyn and IV Vancomycin. She is followed by infectious disease. Ms. Vinicio German is alert but non-verbal. Spoke with RN who states the night nurse reported the peg tube was occluded but current RN reports she was able to flush the peg tube without difficulty. She has glucerna infusing at 60 ml/hr without difficulty.  Abdomen is soft with normoactive bowel sounds.          Objective:     Vitals:    04/11/22 1953 04/11/22 2337 04/12/22 0245 04/12/22 0724   BP: (!) 156/66 (!) 169/70 (!) 158/66 (!) 166/60   Pulse: 77 82 76 84   Resp: 20 20 18 20   Temp: 98.2 °F (36.8 °C) 98.7 °F (37.1 °C) 98.8 °F (37.1 °C) 98.9 °F (37.2 °C)   SpO2: 96% 98% 98% 100%   Weight:       Height: Intake and Output:  Current Shift: 04/12 0701 - 04/12 1900  In: 220   Out: 0847 [Urine:1300; Drains:350]  Last three shifts: 04/10 1901 - 04/12 0700  In: 2967 [I.V.:1100]  Out: 2375 [Urine:2375]    Physical Exam:   Skin:  Extremities and face reveal no rashes. No herrera erythema. No telangiectasias on the chest wall. HEENT: Sclerae anicteric. Extra-occular muscles are intact. No oral ulcers. Cardiovascular: Regular rate and rhythm. Respiratory:  Comfortable breathing with no accessory muscle use. GI:  Abdomen nondistended, soft, and nontender. Normal active bowel sounds. Peg tube site w/o redness or drainage. Rectal:  Deferred  Musculoskeletal: Generalized weakness  Neurological:  Alert, non verbal  Psychiatric:  Not anxious  Lymphatic:  No cervical or supraclavicular adenopathy.     Lab/Data Review:  Recent Results (from the past 24 hour(s))   VANCOMYCIN, RANDOM    Collection Time: 04/11/22  3:41 PM   Result Value Ref Range    Vancomycin, random 16.9 ug/mL   GLUCOSE, POC    Collection Time: 04/11/22  4:35 PM   Result Value Ref Range    Glucose (POC) 330 (H) 65 - 117 mg/dL    Performed by Ramona Dc    GLUCOSE, POC    Collection Time: 04/11/22  7:59 PM   Result Value Ref Range    Glucose (POC) 305 (H) 65 - 117 mg/dL    Performed by Nayely Jorge    GLUCOSE, POC    Collection Time: 04/11/22 11:53 PM   Result Value Ref Range    Glucose (POC) 340 (H) 65 - 117 mg/dL    Performed by Jammie Bonilla ([place])    GLUCOSE, POC    Collection Time: 04/12/22  5:53 AM   Result Value Ref Range    Glucose (POC) 348 (H) 65 - 117 mg/dL    Performed by Jammie Bonilla ([place])    METABOLIC PANEL, COMPREHENSIVE    Collection Time: 04/12/22  7:11 AM   Result Value Ref Range    Sodium 156 (H) 136 - 145 mmol/L    Potassium 4.4 3.5 - 5.1 mmol/L    Chloride 126 (H) 97 - 108 mmol/L    CO2 28 21 - 32 mmol/L    Anion gap 2 (L) 5 - 15 mmol/L    Glucose 369 (H) 65 - 100 mg/dL    BUN 84 (H) 6 - 20 mg/dL Creatinine 1.33 (H) 0.55 - 1.02 mg/dL    BUN/Creatinine ratio 63 (H) 12 - 20      GFR est AA 47 (L) >60 ml/min/1.73m2    GFR est non-AA 39 (L) >60 ml/min/1.73m2    Calcium 8.3 (L) 8.5 - 10.1 mg/dL    Bilirubin, total 0.2 0.2 - 1.0 mg/dL    AST (SGOT) 24 15 - 37 U/L    ALT (SGPT) 40 12 - 78 U/L    Alk. phosphatase 99 45 - 117 U/L    Protein, total 6.3 (L) 6.4 - 8.2 g/dL    Albumin 1.7 (L) 3.5 - 5.0 g/dL    Globulin 4.6 (H) 2.0 - 4.0 g/dL    A-G Ratio 0.4 (L) 1.1 - 2.2     CBC WITH AUTOMATED DIFF    Collection Time: 04/12/22  7:11 AM   Result Value Ref Range    WBC 18.9 (H) 3.6 - 11.0 K/uL    RBC 4.03 3.80 - 5.20 M/uL    HGB 11.2 (L) 11.5 - 16.0 g/dL    HCT 37.8 35.0 - 47.0 %    MCV 93.8 80.0 - 99.0 FL    MCH 27.8 26.0 - 34.0 PG    MCHC 29.6 (L) 30.0 - 36.5 g/dL    RDW 17.1 (H) 11.5 - 14.5 %    PLATELET 100 861 - 428 K/uL    MPV 11.6 8.9 - 12.9 FL    NRBC 0.0 0.0  WBC    ABSOLUTE NRBC 0.00 0.00 - 0.01 K/uL    NEUTROPHILS 89 (H) 32 - 75 %    LYMPHOCYTES 7 (L) 12 - 49 %    MONOCYTES 3 (L) 5 - 13 %    EOSINOPHILS 0 0 - 7 %    BASOPHILS 0 0 - 1 %    IMMATURE GRANULOCYTES 1 (H) 0 - 0.5 %    ABS. NEUTROPHILS 16.9 (H) 1.8 - 8.0 K/UL    ABS. LYMPHOCYTES 1.3 0.8 - 3.5 K/UL    ABS. MONOCYTES 0.5 0.0 - 1.0 K/UL    ABS. EOSINOPHILS 0.0 0.0 - 0.4 K/UL    ABS. BASOPHILS 0.0 0.0 - 0.1 K/UL    ABS. IMM.  GRANS. 0.1 (H) 0.00 - 0.04 K/UL    DF AUTOMATED     C REACTIVE PROTEIN, QT    Collection Time: 04/12/22  7:11 AM   Result Value Ref Range    C-Reactive protein 2.33 (H) 0.00 - 0.60 mg/dL   PROCALCITONIN    Collection Time: 04/12/22  7:11 AM   Result Value Ref Range    Procalcitonin 1.93 (H) 0 ng/mL   VANCOMYCIN, RANDOM    Collection Time: 04/12/22  7:11 AM   Result Value Ref Range    Vancomycin, random 20.6 ug/mL    Reported dose date Not provided      Reported dose: Not provided Units   GLUCOSE, POC    Collection Time: 04/12/22  9:37 AM   Result Value Ref Range    Glucose (POC) 359 (H) 65 - 117 mg/dL    Performed by Lesley Holden Benita ([place])    GLUCOSE, POC    Collection Time: 04/12/22 11:20 AM   Result Value Ref Range    Glucose (POC) 348 (H) 65 - 117 mg/dL    Performed by Sole English               XR CHEST PORT   Final Result   No acute pulmonary process. XR CHEST PORT   Final Result   No significant change. XR CHEST PORT   Final Result      XR CHEST PORT   Final Result   No acute cardiopulmonary abnormality. .       XR CHEST PORT   Final Result      CT HEAD WO CONT   Final Result   Chronic changes which appear stable. No acute or active intracranial process. Chronic paranasal sinus disease         XR CHEST PORT   Final Result   No acute findings. Assessment:     Active Problems:    Hyperkalemia (4/4/2022)      UTI (urinary tract infection) (4/4/2022)      Sepsis ([address] (4/4/2022)      RICARDO (acute kidney injury) ([facility] [address] (4/4/2022)      AMS (altered mental status) (4/4/2022)        Plan:     1. Peg tube Malfunction (Resolved)      Currently peg tube is infusing Glucerna at 60 ml/hr      Per RN peg tube flushed w/o difficulty      Peg tube site w/o redness or drainage. 2. CKD      Nephrology input appreciated  3. Hypertension     Continue amolipine 5 mg daily      Continue hydralazine 50 mg TID      Metoprolol 50 mg BID  4. Type 2 Diabetes      Continue home hypoglycemic medications      Continue bedside glucose monitoring    Peg tube working without difficulty. GI signing off at this jesus. Please re-consult if needed. Thank you for allowing me to participate in this patients care. Plan discussed with Dr. Clara Keith and he approves.     Signed By: Coreen Mace NP     April 12, 2022

## 2022-04-12 NOTE — PROGRESS NOTES
Progress Note    Patient: Rambo Jaime MRN: [id number]  SSN: [id number]    YOB: 1945  Age: 78 y.o. Sex: female      Admit Date: 4/3/2022    LOS: 8 days     Subjective:   Patient followed for sepsis with UTI secondary to Pseudomonas sensitive to Zosyn. She is now afebrile with decreasing WBC, procal and CRP. She has been transferred out of the ICU. Her CXR is unremarkable and she is now on room air. Blood cultures negativ so far. Patient lying in bed, appears to be resting comfortably, remains nonverbal.  Family members at bedside. Objective:     Vitals:    04/11/22 1953 04/11/22 2337 04/12/22 0245 04/12/22 0724   BP: (!) 156/66 (!) 169/70 (!) 158/66 (!) 166/60   Pulse: 77 82 76 84   Resp: 20 20 18 20   Temp: 98.2 °F (36.8 °C) 98.7 °F (37.1 °C) 98.8 °F (37.1 °C) 98.9 °F (37.2 °C)   SpO2: 96% 98% 98% 100%   Weight:       Height:            Intake and Output:  Current Shift: 04/12 0701 - 04/12 1900  In: -   Out: 350 [Drains:350]  Last three shifts: 04/10 1901 - 04/12 0700  In: 2967 [I.V.:1100]  Out: 2375 [Urine:2375]    Physical Exam:   Vitals and nursing note reviewed. Constitutional:       General: She is not in acute distress. Appearance: She is ill-appearing. HENT: eyes closed, Room Air   Cardiovascular:      Rate and Rhythm: Normal rate and regular rhythm. Heart sounds: No murmur heard. Pulmonary:      Effort: Pulmonary effort is normal.      Breath sounds: Normal breath sounds. Abdominal:      General: Bowel sounds are normal.      Palpations: Abdomen is soft. Tenderness: There is no abdominal tenderness. Comments: PEG tube in place, site unremarkable, tube feeding in progress  Genitourinary:     Comments: Bell catheter  Flexiseal with dark brown watery stool  Musculoskeletal:      Right lower leg: No edema. Left lower leg: No edema.       Comments: Left great toe with dry gangrene, dark and shrunken   Skin: Stage 2 sacral wound     Findings: No rash.   Neurological:      Mental Status: She is alert. Comments: Unable to assess   Psychiatric:      Comments: Unable to assess      Lab/Data Review:     WBC 18,900    Procal  1.93 <1.64 <3.37 <3.78 <4.78 <6.93  CRP 2.33 <3.44 <11.50 < 14.50 <16.10 <21.40 <28.40    Blood cultures (4/3) No growth FINAL  Blood cultures (4/10) 1 of  4 bottles flagged positive, No organisms seen  Urine culture (4/7) >100,000 cfu/ml Pseudomonas aeruginosa       CXR (4/11) No acute pulmonary process    Assessment:     Active Problems:    Hyperkalemia (4/4/2022)      UTI (urinary tract infection) (4/4/2022)      Sepsis ([facility] [address] (4/4/2022)      RICARDO (acute kidney injury) ([facility] [address] (4/4/2022)      AMS (altered mental status) (4/4/2022)    1. Sepsis with fever and leukocytosis, elevated procal and CRP, resolving, on empiric IV Vancomycin  2. UTI with marked pyuria and bacteriuria, secondary to Pseudomonas aeruginosa, Day #9 IV Zosyn  3. Altered mentals status, multifactorial including sepsis  4. Uncontrolled diabetes mellitus with hyperglycemia  5. Chronic maxillary sinusitis  6. ASCVD with prior stroke  7. Hepatitis C antibody positive, HCV RNA negative  8. 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 Sacral wound, Stage 2, no evidence of gross infection  10. Acute hypoxic respiratory failure, resolved  11. Diarrhea, presumed secondary to tube feeding     Comment:  Unclear why she became febrile on Zosyn since urine isolate of Pseudomonas is sensitive. WBC still declining as well after Vancomycin and Fluconazole were started. That being said, procal and CRP have steadily declined. Plan:   1. Continue IV Zosyn and Vancomycin; discontinue Vancomycin if WBC continues to decrease  2. Follow-up blood cultures  3.  In am, repeat CBC, procal and CRP        Signed By: Jp Barcenas MD     April 12, 2022

## 2022-04-13 NOTE — PROGRESS NOTES
PROGRESS NOTE    Patient: Eli Lagos MRN: [id number]  SSN: [id number]    YOB: 1945  Age: 78 y.o. Sex: female      Admit Date: 4/3/2022    LOS: 9 days       Subjective     Chief Complaint   Patient presents with    Altered mental status       HPI: Patient is a 78y.o. 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 04/05   Patient is seen at bedside with daughter and nephew today. Patient is in distress due to pain and daughter notes that patient gets Neurontin TID for neuropathy daily. Otherwise, patient is still receiving enteric feeding through PEG tube and getting IVF. Patient was seen by nephrology yesterday. Recommends obtaining renal panel and continuing IVF. Patient was seen by ID yesterday. Recommends continuing IV vancomycin for urosepsis. Patient was seen by pulmonology today. Recommends PRBC transfusion. Labs indicate WBC 16.9, Hgb 6.3, Na 148, Cl 120, BUN 72, Cr 1.71, Ca 8, CRP 28.4, pro-carrie 6.93 and .       04/06   Patient is seen at bedside. Patient received 2 units of PRBC. Patient is on Zosyn. No new changes today. Patient seen by the vascular surgeon he recommend great toe amputation  And for sacral wound recommend Medihoney ointment     Labs show increasing Hgb of 9, decreasing WBC 14.7, Na 147, , BUN 55,   Cr 1.47, and CRP 21.4. CXR is unremarkable. 04/07  Patient is seen resting at bedside.  Patient received one unit of PRBC with increased Hgb of 10.6. No acute changes today. Patient is seen by nephrology who recommends free water flushes to correct hypernatremia. Labs show increasing Hgb of 10.6, WBC 15.1, pro-calcitonin 3.78, and CRP 16.1.    4/8    Patient had a rapid response this morning ABG and chest x-ray was ordered  Patient tachycardic tachypneic  ABG  pH 7.51 PCO2 31 PO2 65 bicarb 26 oxygen saturation 95% on room air    Chest x-ray shows no much changes    4/9    Patient resting the bed open eyes  On room air/breathing through the mouth    4/10    Patient open eyes not in distress breathing through her mouth  According to the nurse patient not sleep all day and all night yesterday      4/11  Patient is seen at bedside. She is awake but not in any distress. Patient is on zosyn. PEG tube in place. Urine culture positive for Pseudomonas. CXR shows no focal changes. Labs remarkable for WBC  22.3, Hgb 10.5, Na 153, , Cr 1.55 BUN 94, pro-calcitonin 1.64, and CRP 3.33.    4/12  Patient is on Vancomycin, zosyn and D5W. PEG tube in place. Spoke to daughter at bedside. She has no new complaints. Labs remarkable for:  WBC  18.9,  Cr 1.33, BUN 84,  and CRP 2.33 which are decreasing. Hgb 11.2, Na-corrected 158, , and pro-calcitonin 1.93 which are increasing. Seen by pulmonology. No new recommendations. Seen by GI. No new recommendations. Seen by nephrology. recommends IV D5W IVF to decrease hypernatremia. Seen by ID. Recommends discontinuing fluconazole, follow up blood cultures, and continuing IV zosyn and vancomycin. 4/13/2022  Patient is on Vancomycin, zosyn and D5W. PEG tube in place. Nephrology - recommends IV D5W IVF to decrease hypernatremia. Increase glargine to 25 units twice daily to correct hyperglycemia.  Once the D5W is discontinued, the dose of glargine will have to be decreased    ID - Continue IV Zosyn and Vancomycin; discontinue Vancomycin if WBC continues to decrease    Pulmonology - No new recommendations. GI - No new recommendations. CXR 4/11/2022 - No acute pulmonary process (no focal changes)     Labs remarkable for:  Blood Glucose 380  WBC  19.8  Hgb 10.3   Na-corrected 154  BUN 69  Cr 1.22  CRP 3.14  pro-calcitonin 2.28     Review of Systems   Unable to perform ROS: Acuity of condition   All other systems reviewed and are negative. Unable to obtain. Objective     Visit Vitals  BP (!) 148/56   Pulse 73   Temp 98.3 °F (36.8 °C)   Resp 20   Ht 5' 8\" (1.727 m)   Wt 86.6 kg (191 lb)   SpO2 99%   BMI 29.04 kg/m²    O2 Flow Rate (L/min): 0 l/min O2 Device: None (Room air)    Physical Exam:   Physical Exam  Constitutional: Open eyes do not follow any commands. Tracks with eyes. HENT:   Head: Normocephalic and atraumatic. Eyes: Pupils are equal, round, and reactive to light. EOM are normal.   Cardiovascular: Normal rate, regular rhythm and normal heart sounds. Pulmonary/Chest: Decreased breath sound   abdominal: Soft. Bowel sounds are normal. There is no abdominal tenderness. There is no rebound and no guarding. Musculoskeletal: Normal range of motion. Neurological: Open eyes do not follow any, right-sided weakness  Intake & Output:  Current Shift: 04/13 0701 - 04/13 1900  In: -   Out: 750 [Urine:750]  Last three shifts: 04/11 1901 - 04/13 0700  In: 2901 [I.V.:1100]  Out: 3900 [Urine:3550; Drains:350]    Lab/Data Review:  Lab results reviewed. For significant abnormal values and values requiring intervention, see assessment and plan.        24 Hour Results:    Recent Results (from the past 24 hour(s))   GLUCOSE, POC    Collection Time: 04/12/22  4:23 PM   Result Value Ref Range    Glucose (POC) 324 (H) 65 - 117 mg/dL    Performed by Javi Amezquita, POC    Collection Time: 04/12/22  7:16 PM   Result Value Ref Range    Glucose (POC) 269 (H) 65 - 117 mg/dL    Performed by Nohemi Zepeda POC Collection Time: 04/13/22  1:12 AM   Result Value Ref Range    Glucose (POC) 312 (H) 65 - 117 mg/dL    Performed by NOÉ Sahu    Collection Time: 04/13/22  4:01 AM   Result Value Ref Range    Vancomycin, random 13.0 ug/mL   CBC WITH AUTOMATED DIFF    Collection Time: 04/13/22  4:01 AM   Result Value Ref Range    WBC 19.8 (H) 3.6 - 11.0 K/uL    RBC 3.69 (L) 3.80 - 5.20 M/uL    HGB 10.3 (L) 11.5 - 16.0 g/dL    HCT 34.6 (L) 35.0 - 47.0 %    MCV 93.8 80.0 - 99.0 FL    MCH 27.9 26.0 - 34.0 PG    MCHC 29.8 (L) 30.0 - 36.5 g/dL    RDW 16.9 (H) 11.5 - 14.5 %    PLATELET 893 716 - 959 K/uL    MPV 11.2 8.9 - 12.9 FL    NRBC 0.0 0.0  WBC    ABSOLUTE NRBC 0.00 0.00 - 0.01 K/uL    NEUTROPHILS 88 (H) 32 - 75 %    LYMPHOCYTES 8 (L) 12 - 49 %    MONOCYTES 3 (L) 5 - 13 %    EOSINOPHILS 0 0 - 7 %    BASOPHILS 0 0 - 1 %    IMMATURE GRANULOCYTES 1 (H) 0 - 0.5 %    ABS. NEUTROPHILS 17.5 (H) 1.8 - 8.0 K/UL    ABS. LYMPHOCYTES 1.6 0.8 - 3.5 K/UL    ABS. MONOCYTES 0.6 0.0 - 1.0 K/UL    ABS. EOSINOPHILS 0.1 0.0 - 0.4 K/UL    ABS. BASOPHILS 0.0 0.0 - 0.1 K/UL    ABS. IMM. GRANS. 0.1 (H) 0.00 - 0.04 K/UL    DF AUTOMATED     PROCALCITONIN    Collection Time: 04/13/22  4:01 AM   Result Value Ref Range    Procalcitonin 2.28 (H) 0 ng/mL   METABOLIC PANEL, COMPREHENSIVE    Collection Time: 04/13/22  4:01 AM   Result Value Ref Range    Sodium 154 (H) 136 - 145 mmol/L    Potassium 4.8 3.5 - 5.1 mmol/L    Chloride 125 (H) 97 - 108 mmol/L    CO2 27 21 - 32 mmol/L    Anion gap 2 (L) 5 - 15 mmol/L    Glucose 359 (H) 65 - 100 mg/dL    BUN 69 (H) 6 - 20 mg/dL    Creatinine 1.22 (H) 0.55 - 1.02 mg/dL    BUN/Creatinine ratio 57 (H) 12 - 20      GFR est AA 52 (L) >60 ml/min/1.73m2    GFR est non-AA 43 (L) >60 ml/min/1.73m2    Calcium 8.3 (L) 8.5 - 10.1 mg/dL    Bilirubin, total 0.3 0.2 - 1.0 mg/dL    AST (SGOT) 37 15 - 37 U/L    ALT (SGPT) 38 12 - 78 U/L    Alk.  phosphatase 81 45 - 117 U/L    Protein, total 5.7 (L) 6.4 - 8.2 g/dL    Albumin 1.6 (L) 3.5 - 5.0 g/dL    Globulin 4.1 (H) 2.0 - 4.0 g/dL    A-G Ratio 0.4 (L) 1.1 - 2.2     C REACTIVE PROTEIN, QT    Collection Time: 04/13/22  4:01 AM   Result Value Ref Range    C-Reactive protein 3.14 (H) 0.00 - 0.60 mg/dL   GLUCOSE, POC    Collection Time: 04/13/22  5:03 AM   Result Value Ref Range    Glucose (POC) 326 (H) 65 - 117 mg/dL    Performed by Reji Evans    GLUCOSE, POC    Collection Time: 04/13/22 11:44 AM   Result Value Ref Range    Glucose (POC) 380 (H) 65 - 117 mg/dL    Performed by Lambert Chin          Imaging:    XR CHEST PORT   Final Result   No acute pulmonary process. XR CHEST PORT   Final Result   No significant change. XR CHEST PORT   Final Result      XR CHEST PORT   Final Result   No acute cardiopulmonary abnormality. .       XR CHEST PORT   Final Result      CT HEAD WO CONT   Final Result   Chronic changes which appear stable. No acute or active intracranial process. Chronic paranasal sinus disease         XR CHEST PORT   Final Result   No acute findings.              Assessment   Severe sepsis with UTI  UTI/Pseudomonas  Acute on chronic kidney disease  Hyperkalemia  Hypernatremia  CVA with right-sided weakness  Anemia of chronic disease  Gangrene of the left great toe  Sacral decubitus ulcer  Acute respiratory failure  Possible aspiration  Elevated BNP    Plan     Amlodipine 5 mg daily  Aspirin 81 mg daily  Lipitor 20 daily  Ferrous sulfate 300 twice a day  Flucanazole 200 mg daily D /C  Gabapentin 300 mg at bedtime  Heparin 5000 units subcu every 8 hours  Hydralazine 50 mg 3 times a day  Lantus 22 units subcu twice a day  Metoprolol 50 mg twice a day  IV Zosyn 3.375 IV every 8 hours  Vancomycin per pharmacy protocol      Monitor renal function and sodium level      Pulmonary nephrology infectious disease and vascular surgical.  Follow-up with the vascular surgeon regarding amputation of the left great toe    Start PEG tube feeding with water flush   Start back on gabapentin at night only    Blood sugars running high secondary D5 at 75 cc/h as per nephrology  Transfer telemetry floor    Discussed with the patient's daughter at the bedside      Current Facility-Administered Medications:     insulin glargine (LANTUS) injection 27 Units, 27 Units, SubCUTAneous, BID, Kalpesh Lara MD    vancomycin (VANCOCIN) 500 mg in 0.9% sodium chloride (MBP/ADV) 100 mL MBP, 500 mg, IntraVENous, ONCE, Gualberto Johnson MD    [START ON 4/14/2022] Vancomycin random level 4/14 at 04:00, , Other, Michelle Schuster MD    artificial saliva (MOUTH KOTE) 1 Spray, 1 Spray, Oral, PRN, Meghan Soto MD    gabapentin (NEURONTIN) capsule 300 mg, 300 mg, Oral, QHS, Israel Soto MD, 300 mg at 04/13/22 0140    amLODIPine (NORVASC) tablet 5 mg, 5 mg, Oral, DAILY, Roldan Song MD, 5 mg at 04/13/22 0945    metoprolol tartrate (LOPRESSOR) tablet 50 mg, 50 mg, Oral, BID, Roldan Song MD, 50 mg at 04/13/22 0945    nitroglycerin (NITROBID) 2 % ointment 1 Inch, 1 Inch, Topical, Q6H, Thmoas Boogie MD, 1 Inch at 04/13/22 1348    hydrALAZINE (APRESOLINE) tablet 50 mg, 50 mg, Oral, TID, Roldan Song MD, 50 mg at 04/13/22 0945    dextrose 5% infusion, 100 mL/hr, IntraVENous, CONTINUOUS, Kalpesh Lara MD, Last Rate: 100 mL/hr at 04/13/22 0030, 100 mL/hr at 04/13/22 0030    [Held by provider] furosemide (LASIX) injection 40 mg, 40 mg, IntraVENous, DAILY, Israel Soto MD, 40 mg at 04/09/22 1054    VANCOMYCIN INFORMATION NOTE, , Other, Rx Dosing/Monitoring, Gualberto Johnson MD    labetaloL (NORMODYNE;TRANDATE) 20 mg/4 mL (5 mg/mL) injection 20 mg, 20 mg, IntraVENous, Q4H PRN, Roldan Song MD, 20 mg at 04/10/22 2017    hydrALAZINE (APRESOLINE) 20 mg/mL injection 20 mg, 20 mg, IntraVENous, Q6H PRN, Israel Soto MD, 20 mg at 04/12/22 0257    0.9% sodium chloride infusion 250 mL, 250 mL, IntraVENous, PRN, MD Noah Hewitt 0.9% sodium chloride infusion 250 mL, 250 mL, IntraVENous, PRN, Rocio Soto MD    insulin lispro (HUMALOG) injection, , SubCUTAneous, Q6H, Israel Soto MD, 10 Units at 04/13/22 1347    atorvastatin (LIPITOR) tablet 20 mg, 20 mg, Oral, QHS, Israel Soto MD, 20 mg at 04/13/22 0030    latanoprost (XALATAN) 0.005 % ophthalmic solution 1 Drop, 1 Drop, Right Eye, QPM, Isreal Soto MD, 1 Drop at 04/12/22 1719    aspirin delayed-release tablet 81 mg, 81 mg, Oral, DAILY, Israel Soto MD, 81 mg at 04/13/22 0945    brimonidine (ALPHAGAN) 0.2 % ophthalmic solution 1 Drop, 1 Drop, Both Eyes, TID, Israel Soto MD, 1 Drop at 04/13/22 1002    glucose chewable tablet 16 g, 4 Tablet, Oral, PRN, Rocio Soto MD    glucagon (GLUCAGEN) injection 1 mg, 1 mg, IntraMUSCular, PRN, Buddy Domingo MD  [place] [facility]  [DISCONTINUED] acetaminophen (TYLENOL) tablet 650 mg, 650 mg, Oral, Q6H PRN, 650 mg at 04/04/22 2246 **OR** acetaminophen (TYLENOL) suppository 650 mg, 650 mg, Rectal, Q6H PRN, Rocio Soto MD    polyethylene glycol (MIRALAX) packet 17 g, 17 g, Oral, DAILY PRN, Rocio Soto MD    ondansetron (ZOFRAN ODT) tablet 4 mg, 4 mg, Oral, Q8H PRN **OR** ondansetron (ZOFRAN) injection 4 mg, 4 mg, IntraVENous, Q6H PRN, Israel Soto MD    heparin (porcine) injection 5,000 Units, 5,000 Units, SubCUTAneous, Q8H, Israel Soto MD, 5,000 Units at 04/13/22 0945    piperacillin-tazobactam (ZOSYN) 3.375 g in 0.9% sodium chloride (MBP/ADV) 100 mL MBP, 3.375 g, IntraVENous, Q8H, Israel Soto MD, Last Rate: 25 mL/hr at 04/13/22 0945, 3.375 g at 04/13/22 0945    albuterol-ipratropium (DUO-NEB) 2.5 MG-0.5 MG/3 ML, 3 mL, Nebulization, Q6H PRN, Israel Soto MD    ferrous sulfate 300 mg (60 mg iron)/5 mL oral syrup 300 mg, 300 mg, Oral, BID, Israel Soto MD, 300 mg at 04/13/22 0945    acetaminophen (TYLENOL) solution 650 mg, 650 mg, Per NG tube, Q6H PRN, Rocio Soto MD, 650 mg at 04/13/22 0944    Current Outpatient Medications   Medication Instructions    acidophilus-pectin, citrus 25 million cell -100 mg tab tablet 2 Tablets, Oral, DAILY    amLODIPine (NORVASC) 5 mg, Oral, DAILY    artificial saliva (MOUTH KOTE) spra 1 Spray, Oral, 3 TIMES DAILY    aspirin delayed-release 81 mg tablet Oral, DAILY    atorvastatin (LIPITOR) 20 mg tablet TAKE 1 TABLET BY MOUTH EVERY NIGHT    brimonidine (ALPHAGAN) 0.2 % ophthalmic solution 1 Drop, Both Eyes, 3 TIMES DAILY    ferrous sulfate 325 mg, Oral, 2 TIMES DAILY    gabapentin (NEURONTIN) 300 mg, Oral, 2 TIMES DAILY    insulin glargine (LANTUS) 50 Units, SubCUTAneous, DAILY    latanoprost (XALATAN) 0.005 % ophthalmic solution 1 Drop, Right Eye, EVERY EVENING    lidocaine (XYLOCAINE) 4 % (40 mg/mL) topical solution 1 mL, Topical, AS NEEDED    metoprolol succinate (TOPROL-XL) 50 mg, Oral, 2 TIMES DAILY         Signed By: Shelby Bunch MD     April 13, 2022

## 2022-04-13 NOTE — PROGRESS NOTES
Problem: Diabetes Self-Management  Goal: *Disease process and treatment process  Description: Define diabetes and identify own type of diabetes; list 3 options for treating diabetes. Outcome: Progressing Towards Goal  Goal: *Incorporating nutritional management into lifestyle  Description: Describe effect of type, amount and timing of food on blood glucose; list 3 methods for planning meals. Outcome: Progressing Towards Goal  Goal: *Incorporating physical activity into lifestyle  Description: State effect of exercise on blood glucose levels. Outcome: Progressing Towards Goal  Goal: *Developing strategies to promote health/change behavior  Description: Define the ABC's of diabetes; identify appropriate screenings, schedule and personal plan for screenings. Outcome: Progressing Towards Goal  Goal: *Using medications safely  Description: State effect of diabetes medications on diabetes; name diabetes medication taking, action and side effects. Outcome: Progressing Towards Goal  Goal: *Monitoring blood glucose, interpreting and using results  Description: Identify recommended blood glucose targets  and personal targets. Outcome: Progressing Towards Goal  Goal: *Prevention, detection, treatment of acute complications  Description: List symptoms of hyper- and hypoglycemia; describe how to treat low blood sugar and actions for lowering  high blood glucose level. Outcome: Progressing Towards Goal  Goal: *Prevention, detection and treatment of chronic complications  Description: Define the natural course of diabetes and describe the relationship of blood glucose levels to long term complications of diabetes.   Outcome: Progressing Towards Goal  Goal: *Developing strategies to address psychosocial issues  Description: Describe feelings about living with diabetes; identify support needed and support network  Outcome: Progressing Towards Goal  Goal: *Insulin pump training  Outcome: Progressing Towards Goal  Goal: *Sick day guidelines  Outcome: Progressing Towards Goal  Goal: *Patient Specific Goal (EDIT GOAL, INSERT TEXT)  Outcome: Progressing Towards Goal     Problem: Patient Education: Go to Patient Education Activity  Goal: Patient/Family Education  Outcome: Progressing Towards Goal     Problem: Pressure Injury - Risk of  Goal: *Prevention of pressure injury  Description: Document Shakir Scale and appropriate interventions in the flowsheet. Outcome: Progressing Towards Goal  Note: Pressure Injury Interventions:  Sensory Interventions: Turn and reposition approx. every two hours (pillows and wedges if needed)    Moisture Interventions: Check for incontinence Q2 hours and as needed    Activity Interventions: Pressure redistribution bed/mattress(bed type)    Mobility Interventions: Pressure redistribution bed/mattress (bed type)    Nutrition Interventions: Document food/fluid/supplement intake    Friction and Shear Interventions: Apply protective barrier, creams and emollients                Problem: Patient Education: Go to Patient Education Activity  Goal: Patient/Family Education  Outcome: Progressing Towards Goal     Problem: Falls - Risk of  Goal: *Absence of Falls  Description: Document Maryann Fall Risk and appropriate interventions in the flowsheet.   Outcome: Progressing Towards Goal  Note: Fall Risk Interventions:  Mobility Interventions: Bed/chair exit alarm    Mentation Interventions: Bed/chair exit alarm,Adequate sleep, hydration, pain control    Medication Interventions: Bed/chair exit alarm    Elimination Interventions: Bed/chair exit alarm,Call light in reach              Problem: Patient Education: Go to Patient Education Activity  Goal: Patient/Family Education  Outcome: Progressing Towards Goal     Problem: Impaired Skin Integrity/Pressure Injury Treatment  Goal: *Improvement of Existing Pressure Injury  Outcome: Progressing Towards Goal  Goal: *Prevention of pressure injury  Description: Document Shakir Scale and appropriate interventions in the flowsheet. Outcome: Progressing Towards Goal  Note: Pressure Injury Interventions:  Sensory Interventions: Turn and reposition approx.  every two hours (pillows and wedges if needed)    Moisture Interventions: Check for incontinence Q2 hours and as needed    Activity Interventions: Pressure redistribution bed/mattress(bed type)    Mobility Interventions: Pressure redistribution bed/mattress (bed type)    Nutrition Interventions: Document food/fluid/supplement intake    Friction and Shear Interventions: Apply protective barrier, creams and emollients                Problem: Patient Education: Go to Patient Education Activity  Goal: Patient/Family Education  Outcome: Progressing Towards Goal

## 2022-04-13 NOTE — PROGRESS NOTES
0800: Chart reviewed. Per MD notes non-verbal patient with PEG, giraldo and flexi on IV ABX for UTI/sepsis. When medically stable, patient to return to [facility]. CM will continue to follow patient and recs of medical team.    [phone]: Nephrology and Pulmonology Note attached in [address] to [facility].

## 2022-04-13 NOTE — PROGRESS NOTES
PROGRESS NOTE    Patient: Rambo Jaime MRN: [id number]  SSN: [id number]    YOB: 1945  Age: 78 y.o. Sex: female      Admit Date: 4/3/2022    LOS: 9 days       Subjective     Chief Complaint   Patient presents with    Altered mental status       HPI: Patient is a 78y.o. 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 04/05   Patient is seen at bedside with daughter and nephew today. Patient is in distress due to pain and daughter notes that patient gets Neurontin TID for neuropathy daily. Otherwise, patient is still receiving enteric feeding through PEG tube and getting IVF. Patient was seen by nephrology yesterday. Recommends obtaining renal panel and continuing IVF. Patient was seen by ID yesterday. Recommends continuing IV vancomycin for urosepsis. Patient was seen by pulmonology today. Recommends PRBC transfusion. Labs indicate WBC 16.9, Hgb 6.3, Na 148, Cl 120, BUN 72, Cr 1.71, Ca 8, CRP 28.4, pro-carrie 6.93 and .       04/06   Patient is seen at bedside. Patient received 2 units of PRBC. Patient is on Zosyn. No new changes today. Patient seen by the vascular surgeon he recommend great toe amputation  And for sacral wound recommend Medihoney ointment     Labs show increasing Hgb of 9, decreasing WBC 14.7, Na 147, , BUN 55,   Cr 1.47, and CRP 21.4. CXR is unremarkable. 04/07  Patient is seen resting at bedside.  Patient received one unit of PRBC with increased Hgb of 10.6. No acute changes today. Patient is seen by nephrology who recommends free water flushes to correct hypernatremia. Labs show increasing Hgb of 10.6, WBC 15.1, pro-calcitonin 3.78, and CRP 16.1.    4/8    Patient had a rapid response this morning ABG and chest x-ray was ordered  Patient tachycardic tachypneic  ABG  pH 7.51 PCO2 31 PO2 65 bicarb 26 oxygen saturation 95% on room air    Chest x-ray shows no much changes    4/9    Patient resting the bed open eyes  On room air/breathing through the mouth    4/10    Patient open eyes not in distress breathing through her mouth  According to the nurse patient not sleep all day and all night yesterday      4/11  Patient is seen at bedside. She is awake but not in any distress. Patient is on zosyn. PEG tube in place. Urine culture positive for Pseudomonas. CXR shows no focal changes. Labs remarkable for WBC  22.3, Hgb 10.5, Na 153, , Cr 1.55 BUN 94, pro-calcitonin 1.64, and CRP 3.33.    4/12  Patient is on Vancomycin, zosyn and D5W. PEG tube in place. Spoke to daughter at bedside. She has no new complaints. Labs remarkable for:  WBC  18.9,  Cr 1.33, BUN 84,  and CRP 2.33 which are decreasing. Hgb 11.2, Na-corrected 158, , and pro-calcitonin 1.93 which are increasing. Seen by pulmonology. No new recommendations. Seen by GI. No new recommendations. Seen by nephrology. recommends IV D5W IVF to decrease hypernatremia. Seen by ID. Recommends discontinuing fluconazole, follow up blood cultures, and continuing IV zosyn and vancomycin. 4/13/2022  Patient is on Vancomycin, zosyn and D5W. PEG tube in place. Nephrology - recommends IV D5W IVF to decrease hypernatremia. Increase glargine to 25 units twice daily to correct hyperglycemia.  Once the D5W is discontinued, the dose of glargine will have to be decreased    ID - Continue IV Zosyn and Vancomycin; discontinue Vancomycin if WBC continues to decrease    Pulmonology - No new recommendations. GI - No new recommendations. CXR 4/11/2022 - No acute pulmonary process (no focal changes)     Labs remarkable for:  Blood Glucose 380  WBC  19.8  Hgb 10.3   Na-corrected 154  BUN 69  Cr 1.22  CRP 3.14  pro-calcitonin 2.28     Review of Systems   Unable to perform ROS: Acuity of condition   All other systems reviewed and are negative. Unable to obtain. Objective     Visit Vitals  BP (!) 151/61   Pulse 74   Temp 98.3 °F (36.8 °C)   Resp 20   Ht 5' 8\" (1.727 m)   Wt 86.6 kg (191 lb)   SpO2 99%   BMI 29.04 kg/m²    O2 Flow Rate (L/min): 0 l/min O2 Device: None (Room air)    Physical Exam:   Physical Exam  Constitutional: Open eyes do not follow any commands. Tracks with eyes. HENT:   Head: Normocephalic and atraumatic. Eyes: Pupils are equal, round, and reactive to light. EOM are normal.   Cardiovascular: Normal rate, regular rhythm and normal heart sounds. Pulmonary/Chest: Decreased breath sound   abdominal: Soft. Bowel sounds are normal. There is no abdominal tenderness. There is no rebound and no guarding. Musculoskeletal: Normal range of motion. Neurological: Open eyes do not follow any, right-sided weakness  Intake & Output:  Current Shift: 04/13 0701 - 04/13 1900  In: -   Out: 750 [Urine:750]  Last three shifts: 04/11 1901 - 04/13 0700  In: 2420 [I.V.:1100]  Out: 3900 [Urine:3550; Drains:350]    Lab/Data Review:  Lab results reviewed. For significant abnormal values and values requiring intervention, see assessment and plan.        24 Hour Results:    Recent Results (from the past 24 hour(s))   GLUCOSE, POC    Collection Time: 04/12/22  4:23 PM   Result Value Ref Range    Glucose (POC) 324 (H) 65 - 117 mg/dL    Performed by Javi Amezquita, POC    Collection Time: 04/12/22  7:16 PM   Result Value Ref Range    Glucose (POC) 269 (H) 65 - 117 mg/dL    Performed by CECILIA Quinteros Collection Time: 04/13/22  1:12 AM   Result Value Ref Range    Glucose (POC) 312 (H) 65 - 117 mg/dL    Performed by NOÉ Sahu    Collection Time: 04/13/22  4:01 AM   Result Value Ref Range    Vancomycin, random 13.0 ug/mL   CBC WITH AUTOMATED DIFF    Collection Time: 04/13/22  4:01 AM   Result Value Ref Range    WBC 19.8 (H) 3.6 - 11.0 K/uL    RBC 3.69 (L) 3.80 - 5.20 M/uL    HGB 10.3 (L) 11.5 - 16.0 g/dL    HCT 34.6 (L) 35.0 - 47.0 %    MCV 93.8 80.0 - 99.0 FL    MCH 27.9 26.0 - 34.0 PG    MCHC 29.8 (L) 30.0 - 36.5 g/dL    RDW 16.9 (H) 11.5 - 14.5 %    PLATELET 861 085 - 594 K/uL    MPV 11.2 8.9 - 12.9 FL    NRBC 0.0 0.0  WBC    ABSOLUTE NRBC 0.00 0.00 - 0.01 K/uL    NEUTROPHILS 88 (H) 32 - 75 %    LYMPHOCYTES 8 (L) 12 - 49 %    MONOCYTES 3 (L) 5 - 13 %    EOSINOPHILS 0 0 - 7 %    BASOPHILS 0 0 - 1 %    IMMATURE GRANULOCYTES 1 (H) 0 - 0.5 %    ABS. NEUTROPHILS 17.5 (H) 1.8 - 8.0 K/UL    ABS. LYMPHOCYTES 1.6 0.8 - 3.5 K/UL    ABS. MONOCYTES 0.6 0.0 - 1.0 K/UL    ABS. EOSINOPHILS 0.1 0.0 - 0.4 K/UL    ABS. BASOPHILS 0.0 0.0 - 0.1 K/UL    ABS. IMM. GRANS. 0.1 (H) 0.00 - 0.04 K/UL    DF AUTOMATED     PROCALCITONIN    Collection Time: 04/13/22  4:01 AM   Result Value Ref Range    Procalcitonin 2.28 (H) 0 ng/mL   METABOLIC PANEL, COMPREHENSIVE    Collection Time: 04/13/22  4:01 AM   Result Value Ref Range    Sodium 154 (H) 136 - 145 mmol/L    Potassium 4.8 3.5 - 5.1 mmol/L    Chloride 125 (H) 97 - 108 mmol/L    CO2 27 21 - 32 mmol/L    Anion gap 2 (L) 5 - 15 mmol/L    Glucose 359 (H) 65 - 100 mg/dL    BUN 69 (H) 6 - 20 mg/dL    Creatinine 1.22 (H) 0.55 - 1.02 mg/dL    BUN/Creatinine ratio 57 (H) 12 - 20      GFR est AA 52 (L) >60 ml/min/1.73m2    GFR est non-AA 43 (L) >60 ml/min/1.73m2    Calcium 8.3 (L) 8.5 - 10.1 mg/dL    Bilirubin, total 0.3 0.2 - 1.0 mg/dL    AST (SGOT) 37 15 - 37 U/L    ALT (SGPT) 38 12 - 78 U/L    Alk.  phosphatase 81 45 - 117 U/L    Protein, total 5.7 (L) 6.4 - 8.2 g/dL    Albumin 1.6 (L) 3.5 - 5.0 g/dL    Globulin 4.1 (H) 2.0 - 4.0 g/dL    A-G Ratio 0.4 (L) 1.1 - 2.2     C REACTIVE PROTEIN, QT    Collection Time: 04/13/22  4:01 AM   Result Value Ref Range    C-Reactive protein 3.14 (H) 0.00 - 0.60 mg/dL   GLUCOSE, POC    Collection Time: 04/13/22  5:03 AM   Result Value Ref Range    Glucose (POC) 326 (H) 65 - 117 mg/dL    Performed by Mily Fang          Imaging:    XR CHEST PORT   Final Result   No acute pulmonary process. XR CHEST PORT   Final Result   No significant change. XR CHEST PORT   Final Result      XR CHEST PORT   Final Result   No acute cardiopulmonary abnormality. .       XR CHEST PORT   Final Result      CT HEAD WO CONT   Final Result   Chronic changes which appear stable. No acute or active intracranial process. Chronic paranasal sinus disease         XR CHEST PORT   Final Result   No acute findings.              Assessment   Severe sepsis with UTI  UTI/Pseudomonas  Acute on chronic kidney disease  Hyperkalemia  Hypernatremia  CVA with right-sided weakness  Anemia of chronic disease  Gangrene of the left great toe  Sacral decubitus ulcer  Acute respiratory failure  Possible aspiration  Elevated BNP    Plan     Amlodipine 5 mg daily  Aspirin 81 mg daily  Lipitor 20 daily  Ferrous sulfate 300 twice a day  Flucanazole 200 mg daily D /C  Gabapentin 300 mg at bedtime  Heparin 5000 units subcu every 8 hours  Hydralazine 50 mg 3 times a day  Lantus 22 units subcu twice a day  Metoprolol 50 mg twice a day  IV Zosyn 3.375 IV every 8 hours  Vancomycin per pharmacy protocol      Monitor renal function and sodium level      Pulmonary nephrology infectious disease and vascular surgical.  Follow-up with the vascular surgeon regarding amputation of the left great toe    Start PEG tube feeding with water flush   Start back on gabapentin at night only    Blood sugars running high secondary D5 at 75 cc/h as per nephrology  Transfer telemetry floor    Discussed with the patient's daughter at the bedside      Current Facility-Administered Medications:     insulin glargine (LANTUS) injection 25 Units, 25 Units, SubCUTAneous, BID, Kalpesh Lara MD, 25 Units at 04/13/22 0945    artificial saliva (MOUTH KOTE) 1 Spray, 1 Spray, Oral, PRN, Israel Soto MD    gabapentin (NEURONTIN) capsule 300 mg, 300 mg, Oral, QHS, Israel Soto MD, 300 mg at 04/13/22 0140    amLODIPine (NORVASC) tablet 5 mg, 5 mg, Oral, DAILY, Israel Soto MD, 5 mg at 04/13/22 0945    metoprolol tartrate (LOPRESSOR) tablet 50 mg, 50 mg, Oral, BID, Israel Soto MD, 50 mg at 04/13/22 0945    nitroglycerin (NITROBID) 2 % ointment 1 Inch, 1 Inch, Topical, Q6H, Justin Caldwell MD, 1 Inch at 04/13/22 8304    hydrALAZINE (APRESOLINE) tablet 50 mg, 50 mg, Oral, TID, Trung Baca MD, 50 mg at 04/13/22 0945    dextrose 5% infusion, 100 mL/hr, IntraVENous, CONTINUOUS, Kalpesh Lara MD, Last Rate: 100 mL/hr at 04/13/22 0030, 100 mL/hr at 04/13/22 0030    [Held by provider] furosemide (LASIX) injection 40 mg, 40 mg, IntraVENous, DAILY, Israel Soto MD, 40 mg at 04/09/22 1054    VANCOMYCIN INFORMATION NOTE, , Other, Rx Dosing/Monitoring, Swapna Navarro MD    labetaloL (NORMODYNE;TRANDATE) 20 mg/4 mL (5 mg/mL) injection 20 mg, 20 mg, IntraVENous, Q4H PRN, Israel Soto MD, 20 mg at 04/10/22 2017    hydrALAZINE (APRESOLINE) 20 mg/mL injection 20 mg, 20 mg, IntraVENous, Q6H PRN, Israel Soto MD, 20 mg at 04/12/22 0257    0.9% sodium chloride infusion 250 mL, 250 mL, IntraVENous, PRN, Martínez Crespo MD    0.9% sodium chloride infusion 250 mL, 250 mL, IntraVENous, PRN, Jennifer Soto MD    insulin lispro (HUMALOG) injection, , SubCUTAneous, Q6H, Israel Soto MD, 5 Units at 04/13/22 4029    atorvastatin (LIPITOR) tablet 20 mg, 20 mg, Oral, QHS, Israel Soto MD, 20 mg at 04/13/22 0030    latanoprost (XALATAN) 0.005 % ophthalmic solution 1 Drop, 1 Drop, Right Eye, QPM, Pebbles Morley MD, 1 Drop at 04/12/22 1719    aspirin delayed-release tablet 81 mg, 81 mg, Oral, DAILY, Israel Soto MD, 81 mg at 04/13/22 0945    brimonidine (ALPHAGAN) 0.2 % ophthalmic solution 1 Drop, 1 Drop, Both Eyes, TID, Israel Soto MD, 1 Drop at 04/13/22 1002    glucose chewable tablet 16 g, 4 Tablet, Oral, PRN, Senait Soto MD    glucagon (GLUCAGEN) injection 1 mg, 1 mg, IntraMUSCular, PRN, Pebbles Morley MD  [facility]  [DISCONTINUED] acetaminophen (TYLENOL) tablet 650 mg, 650 mg, Oral, Q6H PRN, 650 mg at 04/04/22 2246 **OR** acetaminophen (TYLENOL) suppository 650 mg, 650 mg, Rectal, Q6H PRN, Senait Soto MD    polyethylene glycol (MIRALAX) packet 17 g, 17 g, Oral, DAILY PRN, Senait Soto MD    ondansetron (ZOFRAN ODT) tablet 4 mg, 4 mg, Oral, Q8H PRN **OR** ondansetron (ZOFRAN) injection 4 mg, 4 mg, IntraVENous, Q6H PRN, Israel Soto MD    heparin (porcine) injection 5,000 Units, 5,000 Units, SubCUTAneous, Q8H, Israel Soto MD, 5,000 Units at 04/13/22 0945    piperacillin-tazobactam (ZOSYN) 3.375 g in 0.9% sodium chloride (MBP/ADV) 100 mL MBP, 3.375 g, IntraVENous, Q8H, Israel Soto MD, Last Rate: 25 mL/hr at 04/13/22 0945, 3.375 g at 04/13/22 0945    albuterol-ipratropium (DUO-NEB) 2.5 MG-0.5 MG/3 ML, 3 mL, Nebulization, Q6H PRN, Israel Soto MD    ferrous sulfate 300 mg (60 mg iron)/5 mL oral syrup 300 mg, 300 mg, Oral, BID, Israel Soto MD, 300 mg at 04/13/22 0945    acetaminophen (TYLENOL) solution 650 mg, 650 mg, Per NG tube, Q6H PRN, Israel Soto MD, 650 mg at 04/13/22 9269    Current Outpatient Medications   Medication Instructions    acidophilus-pectin, citrus 25 million cell -100 mg tab tablet 2 Tablets, Oral, DAILY    amLODIPine (NORVASC) 5 mg, Oral, DAILY    artificial saliva (MOUTH KOTE) spra 1 Spray, Oral, 3 TIMES DAILY    aspirin delayed-release 81 mg tablet Oral, DAILY  atorvastatin (LIPITOR) 20 mg tablet TAKE 1 TABLET BY MOUTH EVERY NIGHT    brimonidine (ALPHAGAN) 0.2 % ophthalmic solution 1 Drop, Both Eyes, 3 TIMES DAILY    ferrous sulfate 325 mg, Oral, 2 TIMES DAILY    gabapentin (NEURONTIN) 300 mg, Oral, 2 TIMES DAILY    insulin glargine (LANTUS) 50 Units, SubCUTAneous, DAILY    latanoprost (XALATAN) 0.005 % ophthalmic solution 1 Drop, Right Eye, EVERY EVENING    lidocaine (XYLOCAINE) 4 % (40 mg/mL) topical solution 1 mL, Topical, AS NEEDED    metoprolol succinate (TOPROL-XL) 50 mg, Oral, 2 TIMES DAILY         Signed By: Rhoda Runner     April 13, 2022

## 2022-04-13 NOTE — PROGRESS NOTES
Renal Daily Progress Note    Admit Date: 4/3/2022      Subjective:   Her eyes are open. Noncommunicative. She has a rectal tube in place. Sugars are still in the 300s.     Current Facility-Administered Medications   Medication Dose Route Frequency    insulin glargine (LANTUS) injection 27 Units  27 Units SubCUTAneous BID    artificial saliva (MOUTH KOTE) 1 Spray  1 Spray Oral PRN    gabapentin (NEURONTIN) capsule 300 mg  300 mg Oral QHS    amLODIPine (NORVASC) tablet 5 mg  5 mg Oral DAILY    metoprolol tartrate (LOPRESSOR) tablet 50 mg  50 mg Oral BID    nitroglycerin (NITROBID) 2 % ointment 1 Inch  1 Inch Topical Q6H    hydrALAZINE (APRESOLINE) tablet 50 mg  50 mg Oral TID    dextrose 5% infusion  100 mL/hr IntraVENous CONTINUOUS    [Held by provider] furosemide (LASIX) injection 40 mg  40 mg IntraVENous DAILY    VANCOMYCIN INFORMATION NOTE   Other Rx Dosing/Monitoring    labetaloL (NORMODYNE;TRANDATE) 20 mg/4 mL (5 mg/mL) injection 20 mg  20 mg IntraVENous Q4H PRN    hydrALAZINE (APRESOLINE) 20 mg/mL injection 20 mg  20 mg IntraVENous Q6H PRN    0.9% sodium chloride infusion 250 mL  250 mL IntraVENous PRN    0.9% sodium chloride infusion 250 mL  250 mL IntraVENous PRN    insulin lispro (HUMALOG) injection   SubCUTAneous Q6H    atorvastatin (LIPITOR) tablet 20 mg  20 mg Oral QHS    latanoprost (XALATAN) 0.005 % ophthalmic solution 1 Drop  1 Drop Right Eye QPM    aspirin delayed-release tablet 81 mg  81 mg Oral DAILY    brimonidine (ALPHAGAN) 0.2 % ophthalmic solution 1 Drop  1 Drop Both Eyes TID    glucose chewable tablet 16 g  4 Tablet Oral PRN    glucagon (GLUCAGEN) injection 1 mg  1 mg IntraMUSCular PRN    acetaminophen (TYLENOL) suppository 650 mg  650 mg Rectal Q6H PRN    polyethylene glycol (MIRALAX) packet 17 g  17 g Oral DAILY PRN    ondansetron (ZOFRAN ODT) tablet 4 mg  4 mg Oral Q8H PRN    Or    ondansetron (ZOFRAN) injection 4 mg  4 mg IntraVENous Q6H PRN    heparin (porcine) injection 5,000 Units  5,000 Units SubCUTAneous Q8H    piperacillin-tazobactam (ZOSYN) 3.375 g in 0.9% sodium chloride (MBP/ADV) 100 mL MBP  3.375 g IntraVENous Q8H    albuterol-ipratropium (DUO-NEB) 2.5 MG-0.5 MG/3 ML  3 mL Nebulization Q6H PRN    ferrous sulfate 300 mg (60 mg iron)/5 mL oral syrup 300 mg  300 mg Oral BID    acetaminophen (TYLENOL) solution 650 mg  650 mg Per NG tube Q6H PRN        Review of Systems    ROS   Obtainable  Objective:     Patient Vitals for the past 8 hrs:   BP Temp Pulse Resp SpO2   04/13/22 1348 (!) 148/56 -- 73 -- 99 %   04/13/22 0753 (!) 151/61 98.3 °F (36.8 °C) 74 20 99 %     04/13 0701 - 04/13 1900  In: -   Out: 750 [Urine:750]  04/11 1901 - 04/13 0700  In: 9906 [I.V.:1100]  Out: 3900 [Urine:3550; Drains:350]    Physical Exam:   Physical Exam  Cardiovascular:      Rate and Rhythm: Regular rhythm. Pulmonary:      Breath sounds: Normal breath sounds. Abdominal:      General: Bowel sounds are normal.      Palpations: Abdomen is soft. Comments: On PEG feeding   Skin:     General: Skin is warm. Absent toes on the right foot        XR CHEST PORT   Final Result   No acute pulmonary process. XR CHEST PORT   Final Result   No significant change. XR CHEST PORT   Final Result      XR CHEST PORT   Final Result   No acute cardiopulmonary abnormality. .       XR CHEST PORT   Final Result      CT HEAD WO CONT   Final Result   Chronic changes which appear stable. No acute or active intracranial process. Chronic paranasal sinus disease         XR CHEST PORT   Final Result   No acute findings.            Data Review   Recent Labs     04/13/22  0401 04/12/22  0711 04/11/22  0430   WBC 19.8* 18.9* 22.3*   HGB 10.3* 11.2* 10.5*   HCT 34.6* 37.8 35.5    383 368     Recent Labs     04/13/22  0401 04/12/22  0711 04/11/22  0430   * 156* 153*   K 4.8 4.4 4.1   * 126* 123*   CO2 27 28 24   * 369* 288*   BUN 69* 84* 94*   CREA 1.22* 1.33* 1.55*   CA 8. 3* 8.3* 8.5   ALB 1.6* 1.7* 1.6*   ALT 38 40 42     No components found for: GLPOC  Recent Labs     04/11/22  0210   PH 7.37   PCO2 40   PO2 84   HCO3 23   FIO2 21     No results for input(s): INR, INREXT, INREXT, INREXT in the last 72 hours. Assessment:           Active Problems:    Hyperkalemia (4/4/2022)      UTI (urinary tract infection) (4/4/2022)      Sepsis ([facility] [address] (4/4/2022)      RICARDO (acute kidney injury) ([facility] [address] (4/4/2022)      AMS (altered mental status) (4/4/2022)    Acute kidney injury on CKD. Creatinine down to 1.22 mg today. This should place her in stage III A    Hypernatremia with a free water deficit of 5 L. Sodium is better. Diarrhea and obligatory fluid loss due to hyperglycemia contributing to the hyponatremia. Leukocytosis     CVA    Pseudomonas aeruginosa urinary tract infection    Diabetes mellitus uncontrolled which is worsening the hypernatremia due to obligatory water loss. Plan:   Water flushes via the PEG. At  225 mL of water every 4 hours. Increased IV D5W to 100 mL/h   Increase glargine to 27 units twice daily to correct hyperglycemia. Once the D5W is discontinued, the dose of glargine will have to be decreased.

## 2022-04-13 NOTE — PROGRESS NOTES
Progress Note    Patient: Jessenia Hudson MRN: [id number]  SSN: [id number]    YOB: 1945  Age: 78 y.o. Sex: female      Admit Date: 4/3/2022    LOS: 9 days     Subjective:   Patient followed for sepsis with UTI secondary to Pseudomonas sensitive to Zosyn. She is now afebrile with decreasing WBC, procal and CRP. She has been transferred out of the ICU. Her CXR is unremarkable and she is now on room air. Blood cultures negative so far. Patient lying in bed, appears to be resting comfortably, remains nonverbal.       Objective:     Vitals:    04/12/22 2340 04/13/22 0129 04/13/22 0500 04/13/22 0753   BP: (!) 147/67 (!) 151/68 (!) 164/78 (!) 151/61   Pulse: 80 82 85 74   Resp: 20 22 21 20   Temp: 98.9 °F (37.2 °C) 99 °F (37.2 °C) 99.4 °F (37.4 °C) 98.3 °F (36.8 °C)   SpO2: 99% 100% 99% 99%   Weight:       Height:            Intake and Output:  Current Shift: No intake/output data recorded. Last three shifts: 04/11 1901 - 04/13 0700  In: 4669 [I.V.:1100]  Out: 3900 [Urine:3550; Drains:350]    Physical Exam:   Vitals and nursing note reviewed. Constitutional:       General: She is not in acute distress. Appearance: She is ill-appearing. HENT: eyes closed, Room Air   Cardiovascular:      Rate and Rhythm: Normal rate and regular rhythm. Heart sounds: No murmur heard. Pulmonary:      Effort: Pulmonary effort is normal.      Breath sounds: Normal breath sounds. Abdominal:      General: Bowel sounds are normal.      Palpations: Abdomen is soft. Tenderness: There is no abdominal tenderness. Comments: PEG tube in place, site unremarkable, tube feeding in progress  Genitourinary:     Comments: Bell catheter with moderate urine sediment  Flexiseal with dark brown watery stool  Musculoskeletal:      Right lower leg: No edema. Left lower leg: No edema. Comments: Left great toe with dry gangrene, dark and shrunken   Skin: Stage 2 sacral wound     Findings: No rash. Neurological:      Mental Status: She is alert. Comments: Unable to assess   Psychiatric:      Comments: Unable to assess      Lab/Data Review:     WBC 19,800    Procal  2.28 <1.93 <1.64 <3.37 <3.78 <4.78 <6.93  CRP 3.14 <2.33 <3.44 <11.50 < 14.50 <16.10 <21.40 <28.40    Blood cultures (4/3) No growth FINAL  Blood cultures (4/10) 1 of  4 bottles flagged positive, No organisms seen  Urine culture (4/7) >100,000 cfu/ml Pseudomonas aeruginosa       CXR (4/11) No acute pulmonary process    Assessment:     Active Problems:    Hyperkalemia (4/4/2022)      UTI (urinary tract infection) (4/4/2022)      Sepsis ([facility] [address] (4/4/2022)      RICARDO (acute kidney injury) ([facility] [address] (4/4/2022)      AMS (altered mental status) (4/4/2022)    1. Sepsis with fever and leukocytosis, elevated procal and CRP, resolving, on empiric IV Vancomycin  2. UTI with marked pyuria and bacteriuria, secondary to Pseudomonas aeruginosa, Day #10 IV Zosyn  3. Altered mentals status, multifactorial including sepsis  4. Uncontrolled diabetes mellitus with hyperglycemia  5. Chronic maxillary sinusitis  6. ASCVD with prior stroke  7. Hepatitis C antibody positive, HCV RNA negative  8. 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 Sacral wound, Stage 2, no evidence of gross infection  10. Acute hypoxic respiratory failure, resolved  11. Diarrhea, presumed secondary to tube feeding     Comment:  WBC, procal and CRP increased today, while still on Vancomycin. Concern now for possible Candida superinfection (now off Fluconazole). Plan:   1. Continue IV Zosyn and Vancomycin; discontinue Vancomycin if WBC continues to decrease  2. Follow-up blood cultures  3. In am, repeat CBC, procal and CRP   4.  If markers still increasing tomorrow, would repeat urinalysis with reflex culture       Signed By: Pipo Beckman MD     April 13, 2022

## 2022-04-13 NOTE — PROGRESS NOTES
IMPRESSION:   1. Sepsis  2. Aspiration pneumonia  3. Urinary tract infection  4. Acute kidney injury  5. Hyperkalemia resolved  6. History of CVA with right-sided weakness  7. Anemia  8. Hypernatremia  9. Hyperkalemia resolved  10. Gangrene of the left great toe sacral decubiti ulcer      RECOMMENDATIONS/PLAN:   1. She is on Zosyn   2. Lasix on hold  3. ABG acceptable and chest x-ray no acute infiltrate  4. Chest x-ray no acute infiltrate  5. WBC elevated most likely secondary to steroids  6. Recieve packed RBC    Time spent patient care 30 minutes  [x] High complexity decision making was performed  [x] See my orders for details  HPI  79-year-old lady nursing home resident came in as she was found confused with altered mental status family noted that she was not acting properly she is in a nursing home unable to get any history to the patient she has significant past medical history of CVA right-sided weakness hypertension diabetes mellitus she is bedridden open eyes does not follow any command possibly septic so admitted, and critical care consult was called. PMH:  has a past medical history of Diabetes mellitus ([address] HTN (hypertension), Hyperlipidemia, Neuropathy, PAD (peripheral artery disease) ([address] and Sciatica. PSH:   has a past surgical history that includes hx other surgical (Bilateral); hx amputation (Right); hx other surgical; hx cervical fusion; hx vascular stent (Bilateral); and hx vascular stent (Bilateral). FHX: family history includes Cancer in her mother; Diabetes in her mother; Hypertension in her mother. SHX:  reports that she has never smoked. She has never used smokeless tobacco. She reports previous alcohol use. She reports that she does not use drugs.     ALL: No Known Allergies     MEDS:   [x] Reviewed - As Below   [] Not reviewed    Current Facility-Administered Medications   Medication    insulin glargine (LANTUS) injection 25 Units    artificial saliva (MOUTH KOTE) 1 Spray    gabapentin (NEURONTIN) capsule 300 mg    amLODIPine (NORVASC) tablet 5 mg    metoprolol tartrate (LOPRESSOR) tablet 50 mg    nitroglycerin (NITROBID) 2 % ointment 1 Inch    hydrALAZINE (APRESOLINE) tablet 50 mg    dextrose 5% infusion    [Held by provider] furosemide (LASIX) injection 40 mg    VANCOMYCIN INFORMATION NOTE    labetaloL (NORMODYNE;TRANDATE) 20 mg/4 mL (5 mg/mL) injection 20 mg    hydrALAZINE (APRESOLINE) 20 mg/mL injection 20 mg    0.9% sodium chloride infusion 250 mL    0.9% sodium chloride infusion 250 mL    insulin lispro (HUMALOG) injection    atorvastatin (LIPITOR) tablet 20 mg    latanoprost (XALATAN) 0.005 % ophthalmic solution 1 Drop    aspirin delayed-release tablet 81 mg    brimonidine (ALPHAGAN) 0.2 % ophthalmic solution 1 Drop    glucose chewable tablet 16 g    glucagon (GLUCAGEN) injection 1 mg    acetaminophen (TYLENOL) suppository 650 mg    polyethylene glycol (MIRALAX) packet 17 g    ondansetron (ZOFRAN ODT) tablet 4 mg    Or    ondansetron (ZOFRAN) injection 4 mg    heparin (porcine) injection 5,000 Units    piperacillin-tazobactam (ZOSYN) 3.375 g in 0.9% sodium chloride (MBP/ADV) 100 mL MBP    albuterol-ipratropium (DUO-NEB) 2.5 MG-0.5 MG/3 ML    ferrous sulfate 300 mg (60 mg iron)/5 mL oral syrup 300 mg    acetaminophen (TYLENOL) solution 650 mg      MAR reviewed and pertinent medications noted or modified as needed   Current Facility-Administered Medications   Medication    insulin glargine (LANTUS) injection 25 Units    artificial saliva (MOUTH KOTE) 1 Spray    gabapentin (NEURONTIN) capsule 300 mg    amLODIPine (NORVASC) tablet 5 mg    metoprolol tartrate (LOPRESSOR) tablet 50 mg    nitroglycerin (NITROBID) 2 % ointment 1 Inch    hydrALAZINE (APRESOLINE) tablet 50 mg    dextrose 5% infusion    [Held by provider] furosemide (LASIX) injection 40 mg    VANCOMYCIN INFORMATION NOTE    labetaloL (NORMODYNE;TRANDATE) 20 mg/4 mL (5 mg/mL) injection 20 mg    hydrALAZINE (APRESOLINE) 20 mg/mL injection 20 mg    0.9% sodium chloride infusion 250 mL    0.9% sodium chloride infusion 250 mL    insulin lispro (HUMALOG) injection    atorvastatin (LIPITOR) tablet 20 mg    latanoprost (XALATAN) 0.005 % ophthalmic solution 1 Drop    aspirin delayed-release tablet 81 mg    brimonidine (ALPHAGAN) 0.2 % ophthalmic solution 1 Drop    glucose chewable tablet 16 g    glucagon (GLUCAGEN) injection 1 mg    acetaminophen (TYLENOL) suppository 650 mg    polyethylene glycol (MIRALAX) packet 17 g    ondansetron (ZOFRAN ODT) tablet 4 mg    Or    ondansetron (ZOFRAN) injection 4 mg    heparin (porcine) injection 5,000 Units    piperacillin-tazobactam (ZOSYN) 3.375 g in 0.9% sodium chloride (MBP/ADV) 100 mL MBP    albuterol-ipratropium (DUO-NEB) 2.5 MG-0.5 MG/3 ML    ferrous sulfate 300 mg (60 mg iron)/5 mL oral syrup 300 mg    acetaminophen (TYLENOL) solution 650 mg      PMH:  has a past medical history of Diabetes mellitus ([address] HTN (hypertension), Hyperlipidemia, Neuropathy, PAD (peripheral artery disease) ([facility] [address] and Sciatica. PSH:   has a past surgical history that includes hx other surgical (Bilateral); hx amputation (Right); hx other surgical; hx cervical fusion; hx vascular stent (Bilateral); and hx vascular stent (Bilateral). FHX: family history includes Cancer in her mother; Diabetes in her mother; Hypertension in her mother. SHX:  reports that she has never smoked. She has never used smokeless tobacco. She reports previous alcohol use. She reports that she does not use drugs. ROS:  Unable to obtain barely unresponsive    Hemodynamics:    CO:    CI:    CVP:    SVR:   PAP Systolic:    PAP Diastolic:    PVR:    ZO20:        Ventilator Settings:      Mode Rate TV Press PEEP FiO2 PIP Min.  Vent                              Vital Signs: Telemetry:    normal sinus rhythm Intake/Output:   Visit Vitals  BP (!) 151/61   Pulse 74   Temp 98.3 °F (36.8 °C)   Resp 20   Ht 5' 8\" (1.727 m)   Wt 86.6 kg (191 lb)   SpO2 99%   BMI 29.04 kg/m²       Temp (24hrs), Av.8 °F (37.1 °C), Min:98.3 °F (36.8 °C), Max:99.4 °F (37.4 °C)        O2 Device: None (Room air) O2 Flow Rate (L/min): 0 l/min       Wt Readings from Last 4 Encounters:   04/10/22 86.6 kg (191 lb)   22 82.2 kg (181 lb 3.5 oz)   20 84.4 kg (186 lb)   20 84.8 kg (187 lb)          Intake/Output Summary (Last 24 hours) at 2022 1058  Last data filed at 2022 0942  Gross per 24 hour   Intake 1702 ml   Output 3250 ml   Net -1548 ml       Last shift:       07 -  1900  In: -   Out: 750 [Urine:750]  Last 3 shifts: 1901 -  0700  In: 1719 [I.V.:1100]  Out: 3900 [Urine:3550; Drains:350]       Physical Exam:     General: Open eyes but does not follow any command  HEENT: NCAT, poor dentition, lips and mucosa dry  Eyes: anicteric; conjunctiva clear  Neck: no nodes, trach midline; no accessory MM use. Chest: no deformity,   Cardiac: R regular; no murmur;   Lungs: distant breath sounds; no wheezes  Abd: soft, NT, hypoactive BS  Ext: no edema; no joint swelling;  No clubbing  : NO giraldo, clear urine  Neuro: She only moves her left arm  Psych- unable to assess  Skin: warm, dry, no cyanosis;   Pulses: 1-2+ Bilateral pedal, radial  Capillary: brisk; pale      DATA:    MAR reviewed and pertinent medications noted or modified as needed  MEDS:   Current Facility-Administered Medications   Medication    insulin glargine (LANTUS) injection 25 Units    artificial saliva (MOUTH KOTE) 1 Spray    gabapentin (NEURONTIN) capsule 300 mg    amLODIPine (NORVASC) tablet 5 mg    metoprolol tartrate (LOPRESSOR) tablet 50 mg    nitroglycerin (NITROBID) 2 % ointment 1 Inch    hydrALAZINE (APRESOLINE) tablet 50 mg    dextrose 5% infusion    [Held by provider] furosemide (LASIX) injection 40 mg    VANCOMYCIN INFORMATION NOTE    labetaloL (NORMODYNE;TRANDATE) 20 mg/4 mL (5 mg/mL) injection 20 mg    hydrALAZINE (APRESOLINE) 20 mg/mL injection 20 mg    0.9% sodium chloride infusion 250 mL    0.9% sodium chloride infusion 250 mL    insulin lispro (HUMALOG) injection    atorvastatin (LIPITOR) tablet 20 mg    latanoprost (XALATAN) 0.005 % ophthalmic solution 1 Drop    aspirin delayed-release tablet 81 mg    brimonidine (ALPHAGAN) 0.2 % ophthalmic solution 1 Drop    glucose chewable tablet 16 g    glucagon (GLUCAGEN) injection 1 mg    acetaminophen (TYLENOL) suppository 650 mg    polyethylene glycol (MIRALAX) packet 17 g    ondansetron (ZOFRAN ODT) tablet 4 mg    Or    ondansetron (ZOFRAN) injection 4 mg    heparin (porcine) injection 5,000 Units    piperacillin-tazobactam (ZOSYN) 3.375 g in 0.9% sodium chloride (MBP/ADV) 100 mL MBP    albuterol-ipratropium (DUO-NEB) 2.5 MG-0.5 MG/3 ML    ferrous sulfate 300 mg (60 mg iron)/5 mL oral syrup 300 mg    acetaminophen (TYLENOL) solution 650 mg        Labs:    Recent Labs     04/13/22  0401 04/12/22  0711 04/11/22  0430   WBC 19.8* 18.9* 22.3*   HGB 10.3* 11.2* 10.5*    383 368     Recent Labs     04/13/22  0401 04/12/22  0711 04/11/22  0430   * 156* 153*   K 4.8 4.4 4.1   * 126* 123*   CO2 27 28 24   * 369* 288*   BUN 69* 84* 94*   CREA 1.22* 1.33* 1.55*   CA 8.3* 8.3* 8.5   ALB 1.6* 1.7* 1.6*   ALT 38 40 42     Recent Labs     04/11/22  0210   PH 7.37   PCO2 40   PO2 84   HCO3 23   FIO2 21     No results for input(s): CPK, CKNDX, TROIQ in the last 72 hours. No lab exists for component: CPKMB  No results found for: BNPP, BNP   Lab Results   Component Value Date/Time    Culture result:  04/10/2022 08:00 AM     One of four bottles has been flagged positive by instrument. Bottle has been sent to [facility] laboratory to assess for possible growth. No organisms seen on Gram stain. Multiple subcultures are in progress.  No growth thus far    Culture result: Pseudomonas aeruginosa (A) 04/07/2022 02:00 PM    Culture result: No growth 7 days 04/03/2022 10:43 PM     Lab Results   Component Value Date/Time    TSH 2.880 12/12/2016 10:13 AM        Imaging:    Results from [place] encounter on 04/03/22    XR CHEST PORT    Narrative  Chest, frontal view, 4/11/2022    History: CHF. Comparison: Chest 4/9/2022. Findings: Surgical hardware at the cervical spine is incompletely imaged. The  cardiac silhouette is within normal limits. The lungs are underexpanded. No  hydrostatic edema is present. No focal consolidation, pleural effusions or  pneumothorax is identified. Degenerative changes are present in the shoulders  and thoracic spine. Impression  No acute pulmonary process. Results from [facility] encounter on 04/03/22    CT HEAD WO CONT    Narrative  PROCEDURE: CT HEAD WO CONT    HISTORY:Altered mental status    COMPARISON:Head CT 3 March 2022    Department policy stipulates all CT scans at this facility are performed using  dose reduction optimization techniques as appropriate to the performed exam,  including the following: Automated exposure control, adjustments of the mA  and/or KVP according to the patient size, and the use of iterative  reconstruction technique. TECHNIQUE: Without IV contrast    Bones/extracranial soft tissues:  Maxillary sinuses and right frontal sinus  remains nearly completely opacified. Extra-axial spaces:  No hemorrhage, mass or focal fluid collection. Ventricles/sulci:  There is mild dilatation of the ventricles. Sulci are  prominent. Brain parenchyma: An area of encephalomalacia in the left frontal lobe is  showing chronic evolution without evidence of hemorrhagic conversion. No other  focal lesions identified. No mass effect. There is good gray-white differentiation. There are  inhomogeneous areas of mildly decreased density in periventricular white matter. Impression  Chronic changes which appear stable.   No acute or active intracranial process.   Chronic paranasal sinus disease

## 2022-04-13 NOTE — PROGRESS NOTES
Vancomycin Dosing Consult  Day #6 of vancomycin therapy  Consult ordered by Dr. Ceferino Araujo for this 78 y.o. female for indication of sepsis, UTI. Antibiotic regimen: Vancomycin + zosyn    Temp (24hrs), Av.8 °F (37.1 °C), Min:98.3 °F (36.8 °C), Max:99.4 °F (37.4 °C)    Recent Labs     22  0401 22  0711 22  0430   WBC 19.8* 18.9* 22.3*   CREA 1.22* 1.33* 1.55*   BUN 69* 84* 94*     Est CrCl: ~46 ml/min  Concomitant nephrotoxic drugs: Loop diuretics    Cultures:   4/3 blood: ngtd - Final   urine: Pseudomonas aeruginosa - Final  4/10 blood: ngtd - prelim    MRSA Swab: Not detected - Final    Target range: Trough 10-15 mcg/mL    Assessment/Plan:   Renal function improving  --RICARDO still present  Procal elevated at 2.28, trending up  CRP elevated, trending down  WBC elevated  Afebrile x 24 hours  Consider de-escalating from vancomycin if no suspicion for MRSA  Patient is not clearing vancomycin well, today level 13.0.   Vancomycin 500mg IV x 1 today, get random level tomorrow AM  Antimicrobial stop date TBD

## 2022-04-14 NOTE — PROGRESS NOTES
Problem: Diabetes Self-Management  Goal: *Disease process and treatment process  Description: Define diabetes and identify own type of diabetes; list 3 options for treating diabetes. Outcome: Progressing Towards Goal  Goal: *Incorporating nutritional management into lifestyle  Description: Describe effect of type, amount and timing of food on blood glucose; list 3 methods for planning meals. Outcome: Progressing Towards Goal  Goal: *Incorporating physical activity into lifestyle  Description: State effect of exercise on blood glucose levels. Outcome: Progressing Towards Goal  Goal: *Developing strategies to promote health/change behavior  Description: Define the ABC's of diabetes; identify appropriate screenings, schedule and personal plan for screenings. Outcome: Progressing Towards Goal  Goal: *Using medications safely  Description: State effect of diabetes medications on diabetes; name diabetes medication taking, action and side effects. Outcome: Progressing Towards Goal  Goal: *Monitoring blood glucose, interpreting and using results  Description: Identify recommended blood glucose targets  and personal targets. Outcome: Progressing Towards Goal  Goal: *Prevention, detection, treatment of acute complications  Description: List symptoms of hyper- and hypoglycemia; describe how to treat low blood sugar and actions for lowering  high blood glucose level. Outcome: Progressing Towards Goal  Goal: *Prevention, detection and treatment of chronic complications  Description: Define the natural course of diabetes and describe the relationship of blood glucose levels to long term complications of diabetes.   Outcome: Progressing Towards Goal  Goal: *Developing strategies to address psychosocial issues  Description: Describe feelings about living with diabetes; identify support needed and support network  Outcome: Progressing Towards Goal  Goal: *Insulin pump training  Outcome: Progressing Towards Goal  Goal: *Sick day guidelines  Outcome: Progressing Towards Goal  Goal: *Patient Specific Goal (EDIT GOAL, INSERT TEXT)  Outcome: Progressing Towards Goal     Problem: Patient Education: Go to Patient Education Activity  Goal: Patient/Family Education  Outcome: Progressing Towards Goal     Problem: Pressure Injury - Risk of  Goal: *Prevention of pressure injury  Description: Document Shakir Scale and appropriate interventions in the flowsheet. Outcome: Progressing Towards Goal  Note: Pressure Injury Interventions:  Sensory Interventions: Turn and reposition approx.  every two hours (pillows and wedges if needed)    Moisture Interventions: Check for incontinence Q2 hours and as needed    Activity Interventions: Pressure redistribution bed/mattress(bed type)    Mobility Interventions: Pressure redistribution bed/mattress (bed type)    Nutrition Interventions: Document food/fluid/supplement intake    Friction and Shear Interventions: Apply protective barrier, creams and emollients

## 2022-04-14 NOTE — PROGRESS NOTES
PROGRESS NOTE    Patient: Maddy Enriquez MRN: [id number]  SSN: [id number]    YOB: 1945  Age: 78 y.o. Sex: female      Admit Date: 4/3/2022    LOS: 10 days       Subjective     Chief Complaint   Patient presents with    Altered mental status       HPI: Patient is a 78y.o. 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 04/05   Patient is seen at bedside with daughter and nephew today. Patient is in distress due to pain and daughter notes that patient gets Neurontin TID for neuropathy daily. Otherwise, patient is still receiving enteric feeding through PEG tube and getting IVF. Patient was seen by nephrology yesterday. Recommends obtaining renal panel and continuing IVF. Patient was seen by ID yesterday. Recommends continuing IV vancomycin for urosepsis. Patient was seen by pulmonology today. Recommends PRBC transfusion. Labs indicate WBC 16.9, Hgb 6.3, Na 148, Cl 120, BUN 72, Cr 1.71, Ca 8, CRP 28.4, pro-carrie 6.93 and .       04/06   Patient is seen at bedside. Patient received 2 units of PRBC. Patient is on Zosyn. No new changes today. Patient seen by the vascular surgeon he recommend great toe amputation  And for sacral wound recommend Medihoney ointment     Labs show increasing Hgb of 9, decreasing WBC 14.7, Na 147, , BUN 55,   Cr 1.47, and CRP 21.4. CXR is unremarkable. 04/07  Patient is seen resting at bedside.  Patient received one unit of PRBC with increased Hgb of 10.6. No acute changes today. Patient is seen by nephrology who recommends free water flushes to correct hypernatremia. Labs show increasing Hgb of 10.6, WBC 15.1, pro-calcitonin 3.78, and CRP 16.1.    4/8    Patient had a rapid response this morning ABG and chest x-ray was ordered  Patient tachycardic tachypneic  ABG  pH 7.51 PCO2 31 PO2 65 bicarb 26 oxygen saturation 95% on room air    Chest x-ray shows no much changes    4/9    Patient resting the bed open eyes  On room air/breathing through the mouth    4/10    Patient open eyes not in distress breathing through her mouth  According to the nurse patient not sleep all day and all night yesterday      4/11  Patient is seen at bedside. She is awake but not in any distress. Patient is on zosyn. PEG tube in place. Urine culture positive for Pseudomonas. CXR shows no focal changes. Labs remarkable for WBC  22.3, Hgb 10.5, Na 153, , Cr 1.55 BUN 94, pro-calcitonin 1.64, and CRP 3.33.    4/12  Patient is on Vancomycin, zosyn and D5W. PEG tube in place. Spoke to daughter at bedside. She has no new complaints. Labs remarkable for:  WBC  18.9,  Cr 1.33, BUN 84,  and CRP 2.33 which are decreasing. Hgb 11.2, Na-corrected 158, , and pro-calcitonin 1.93 which are increasing. Seen by pulmonology. No new recommendations. Seen by GI. No new recommendations. Seen by nephrology. recommends IV D5W IVF to decrease hypernatremia. Seen by ID. Recommends discontinuing fluconazole, follow up blood cultures, and continuing IV zosyn and vancomycin. 4/13/2022  Patient is on Vancomycin, zosyn and D5W. PEG tube in place. Nephrology - recommends IV D5W IVF to decrease hypernatremia. Increase glargine to 25 units twice daily to correct hyperglycemia.  Once the D5W is discontinued, the dose of glargine will have to be decreased    ID - Continue IV Zosyn and Vancomycin; discontinue Vancomycin if WBC continues to decrease    Pulmonology - No new recommendations. GI - No new recommendations. CXR 4/11/2022 - No acute pulmonary process (no focal changes)     Labs remarkable for:  Blood Glucose 380  WBC  19.8  Hgb 10.3   Na-corrected 154  BUN 69  Cr 1.22  CRP 3.14  pro-calcitonin 2.28     4/14  Patient's family members are present at bedside with the patient. They notes that the patient was in distress this morning. Patient still on vancomycin and zosyn. PEG tube in place and patient is receiving enteral feedings. Seen by vascular surgeon who recommends great toe amputation. Labs remarkable for Na-corrected 154 improving, Cr 1.19 improving, pro-carrie 2.42,  and CRP 7.65. Review of Systems   Unable to perform ROS: Acuity of condition   All other systems reviewed and are negative. Unable to obtain. Objective     Visit Vitals  BP (!) 167/64 (BP 1 Location: Right upper arm, BP Patient Position: At rest;Lying)   Pulse 78   Temp 98.2 °F (36.8 °C)   Resp 18   Ht 5' 8\" (1.727 m)   Wt 86.6 kg (191 lb)   SpO2 98%   BMI 29.04 kg/m²    O2 Flow Rate (L/min): 0 l/min O2 Device: None (Room air)    Physical Exam:   Physical Exam  Constitutional: Open eyes do not follow any commands. Tracks with eyes. HENT:   Head: Normocephalic and atraumatic. Eyes: Pupils are equal, round, and reactive to light. EOM are normal.   Cardiovascular: Normal rate, regular rhythm and normal heart sounds. Pulmonary/Chest: Decreased breath sound   abdominal: Soft. Bowel sounds are normal. There is no abdominal tenderness. There is no rebound and no guarding. Musculoskeletal: Normal range of motion. Neurological: Open eyes do not follow any, right-sided weakness  PEG tube in place. Intake & Output:  Current Shift: 04/14 0701 - 04/14 1900  In: -   Out: 400 [Urine:400]  Last three shifts: 04/12 1901 - 04/14 0700  In: 4110   Out: 4800 [Urine:4100; Drains:700]    Lab/Data Review:  Lab results reviewed. For significant abnormal values and values requiring intervention, see assessment and plan. 24 Hour Results:    Recent Results (from the past 24 hour(s))   GLUCOSE, POC    Collection Time: 04/13/22  5:58 PM   Result Value Ref Range    Glucose (POC) 358 (H) 65 - 117 mg/dL    Performed by Malinda Nicholson, POC    Collection Time: 04/13/22  8:52 PM   Result Value Ref Range    Glucose (POC) 320 (H) 65 - 117 mg/dL    Performed by CitlalliIshan Campo [facility] Mauricio, POC    Collection Time: 04/14/22 12:43 AM   Result Value Ref Range    Glucose (POC) 333 (H) 65 - 117 mg/dL    Performed by Michael MILLER, POC    Collection Time: 04/14/22  5:37 AM   Result Value Ref Range    Glucose (POC) 313 (H) 65 - 117 mg/dL    Performed by [address], QT    Collection Time: 04/14/22  7:38 AM   Result Value Ref Range    C-Reactive protein 7.65 (H) 0.00 - 0.60 mg/dL   PROCALCITONIN    Collection Time: 04/14/22  7:38 AM   Result Value Ref Range    Procalcitonin 2.42 (H) 0 ng/mL   VANCOMYCIN, RANDOM    Collection Time: 04/14/22  7:38 AM   Result Value Ref Range    Vancomycin, random 21.0 ug/mL   METABOLIC PANEL, COMPREHENSIVE    Collection Time: 04/14/22  7:38 AM   Result Value Ref Range    Sodium 152 (H) 136 - 145 mmol/L    Potassium 5.0 3.5 - 5.1 mmol/L    Chloride 122 (H) 97 - 108 mmol/L    CO2 27 21 - 32 mmol/L    Anion gap 3 (L) 5 - 15 mmol/L    Glucose 342 (H) 65 - 100 mg/dL    BUN 56 (H) 6 - 20 mg/dL    Creatinine 1.19 (H) 0.55 - 1.02 mg/dL    BUN/Creatinine ratio 47 (H) 12 - 20      GFR est AA 54 (L) >60 ml/min/1.73m2    GFR est non-AA 44 (L) >60 ml/min/1.73m2    Calcium 8.3 (L) 8.5 - 10.1 mg/dL    Bilirubin, total 0.2 0.2 - 1.0 mg/dL    AST (SGOT) 57 (H) 15 - 37 U/L    ALT (SGPT) 41 12 - 78 U/L    Alk.  phosphatase 83 45 - 117 U/L    Protein, total 5.7 (L) 6.4 - 8.2 g/dL    Albumin 1.6 (L) 3.5 - 5.0 g/dL    Globulin 4.1 (H) 2.0 - 4.0 g/dL    A-G Ratio 0.4 (L) 1.1 - 2.2     CBC WITH AUTOMATED DIFF    Collection Time: 04/14/22 11:45 AM   Result Value Ref Range    WBC 18.4 (H) 3.6 - 11.0 K/uL    RBC 3.44 (L) 3.80 - 5.20 M/uL    HGB 9.8 (L) 11.5 - 16.0 g/dL    HCT 32.5 (L) 35.0 - 47.0 %    MCV 94.5 80.0 - 99.0 FL    MCH 28.5 26.0 - 34.0 PG    MCHC 30.2 30.0 - 36.5 g/dL    RDW 16.8 (H) 11.5 - 14.5 %    PLATELET 402 491 - 214 K/uL    MPV 12.0 8.9 - 12.9 FL    NRBC 0.0 0.0  WBC    ABSOLUTE NRBC 0.00 0.00 - 0.01 K/uL    NEUTROPHILS 84 (H) 32 - 75 %    LYMPHOCYTES 11 (L) 12 - 49 %    MONOCYTES 3 (L) 5 - 13 %    EOSINOPHILS 1 0 - 7 %    BASOPHILS 0 0 - 1 %    IMMATURE GRANULOCYTES 1 (H) 0 - 0.5 %    ABS. NEUTROPHILS 15.6 (H) 1.8 - 8.0 K/UL    ABS. LYMPHOCYTES 1.9 0.8 - 3.5 K/UL    ABS. MONOCYTES 0.5 0.0 - 1.0 K/UL    ABS. EOSINOPHILS 0.2 0.0 - 0.4 K/UL    ABS. BASOPHILS 0.0 0.0 - 0.1 K/UL    ABS. IMM. GRANS. 0.1 (H) 0.00 - 0.04 K/UL    DF AUTOMATED     GLUCOSE, POC    Collection Time: 04/14/22 12:04 PM   Result Value Ref Range    Glucose (POC) 366 (H) 65 - 117 mg/dL    Performed by Russ Carroll          Imaging:    XR CHEST PORT   Final Result   No acute pulmonary process. XR CHEST PORT   Final Result   No significant change. XR CHEST PORT   Final Result      XR CHEST PORT   Final Result   No acute cardiopulmonary abnormality. .       XR CHEST PORT   Final Result      CT HEAD WO CONT   Final Result   Chronic changes which appear stable. No acute or active intracranial process. Chronic paranasal sinus disease         XR CHEST PORT   Final Result   No acute findings.              Assessment   Severe sepsis with UTI  UTI/Pseudomonas  Acute on chronic kidney disease  Hyperkalemia  Hypernatremia  CVA with right-sided weakness  Anemia of chronic disease  Gangrene of the left great toe  Sacral decubitus ulcer  Acute respiratory failure  Possible aspiration  Elevated BNP    Plan     Amlodipine 5 mg daily  Aspirin 81 mg daily  Lipitor 20 daily  Ferrous sulfate 300 twice a day  Gabapentin 300 mg once in the morning and at bedtime  Heparin 5000 units subcu every 8 hours  Hydralazine 50 mg 3 times a day  Lantus 22 units subcu twice a day  Metoprolol 50 mg twice a day  IV Zosyn 3.375 IV every 8 hours  Vancomycin per pharmacy protocol      Monitor renal function and sodium level      Pulmonary nephrology infectious disease and vascular surgical.  Schedule for amputation of the left great toe    Start PEG tube feeding with water flush   Start back on gabapentin at night only    Blood sugars running high secondary D5 at 75 cc/h as per nephrology  When sodium level back to normal patient can be discharged    Discussed with the patient's daughter at the bedside      Current Facility-Administered Medications:     vancomycin (VANCOCIN) 500 mg in 0.9% sodium chloride (MBP/ADV) 100 mL MBP, 500 mg, IntraVENous, ONCE, Nuno Ho MD    [START ON 4/15/2022] Vancomycin - Random level to be drawn 4/15 @ 1100, , Other, ONCE, Nuno Ho MD    insulin glargine (LANTUS) injection 32 Units, 32 Units, SubCUTAneous, BID, Israel Soto MD, 32 Units at 04/14/22 0848    artificial saliva (MOUTH KOTE) 1 Spray, 1 Spray, Oral, PRN, Israel Soto MD    gabapentin (NEURONTIN) capsule 300 mg, 300 mg, Oral, QHS, Israel Soto MD, 300 mg at 04/13/22 2127    amLODIPine (NORVASC) tablet 5 mg, 5 mg, Oral, DAILY, Israel Soto MD, 5 mg at 04/14/22 0848    metoprolol tartrate (LOPRESSOR) tablet 50 mg, 50 mg, Oral, BID, Israel Soto MD, 50 mg at 04/14/22 0848    nitroglycerin (NITROBID) 2 % ointment 1 Inch, 1 Inch, Topical, Q6H, Monet Eldridge MD, 1 Inch at 04/14/22 0607    hydrALAZINE (APRESOLINE) tablet 50 mg, 50 mg, Oral, TID, Israel Soto MD, 50 mg at 04/14/22 0847    dextrose 5% infusion, 100 mL/hr, IntraVENous, CONTINUOUS, Kalpesh Lara MD, Last Rate: 100 mL/hr at 04/14/22 0357, 100 mL/hr at 04/14/22 0357    [Held by provider] furosemide (LASIX) injection 40 mg, 40 mg, IntraVENous, DAILY, Rocio Soto MD, 40 mg at 04/09/22 1054    VANCOMYCIN INFORMATION NOTE, , Other, Rx Dosing/Monitoring, Norberto Lyle MD    labetaloL (NORMODYNE;TRANDATE) 20 mg/4 mL (5 mg/mL) injection 20 mg, 20 mg, IntraVENous, Q4H PRN, Israel Soto MD, 20 mg at 04/10/22 2017    hydrALAZINE (APRESOLINE) 20 mg/mL injection 20 mg, 20 mg, IntraVENous, Q6H PRN, Israel Soto MD, 20 mg at 04/12/22 0257    0.9% sodium chloride infusion 250 mL, 250 mL, IntraVENous, PRN, Martínez Crespo MD    insulin lispro (HUMALOG) injection, , SubCUTAneous, Q6H, Israel Soto MD, 5 Units at 04/14/22 0602    atorvastatin (LIPITOR) tablet 20 mg, 20 mg, Oral, QHS, Israel Soto MD, 20 mg at 04/13/22 2126    latanoprost (XALATAN) 0.005 % ophthalmic solution 1 Drop, 1 Drop, Right Eye, QPM, Buddy Domingo MD, 1 Drop at 04/13/22 1827    aspirin delayed-release tablet 81 mg, 81 mg, Oral, DAILY, Israel Soto MD, 81 mg at 04/14/22 0847    brimonidine (ALPHAGAN) 0.2 % ophthalmic solution 1 Drop, 1 Drop, Both Eyes, TID, Buddy Domingo MD, 1 Drop at 04/14/22 1048    glucose chewable tablet 16 g, 4 Tablet, Oral, PRN, Rocio Soto MD    glucagon (GLUCAGEN) injection 1 mg, 1 mg, IntraMUSCular, PRN, Buddy Domingo MD  [place]  [DISCONTINUED] acetaminophen (TYLENOL) tablet 650 mg, 650 mg, Oral, Q6H PRN, 650 mg at 04/04/22 2246 **OR** acetaminophen (TYLENOL) suppository 650 mg, 650 mg, Rectal, Q6H PRN, Rocio Soto MD    polyethylene glycol (MIRALAX) packet 17 g, 17 g, Oral, DAILY PRN, Rocio Soto MD    ondansetron (ZOFRAN ODT) tablet 4 mg, 4 mg, Oral, Q8H PRN **OR** ondansetron (ZOFRAN) injection 4 mg, 4 mg, IntraVENous, Q6H PRN, Israel Soto MD    heparin (porcine) injection 5,000 Units, 5,000 Units, SubCUTAneous, Q8H, Israel Soto MD, 5,000 Units at 04/14/22 0848    piperacillin-tazobactam (ZOSYN) 3.375 g in 0.9% sodium chloride (MBP/ADV) 100 mL MBP, 3.375 g, IntraVENous, Rafia Spence MD, Last Rate: 25 mL/hr at 04/14/22 1048, 3.375 g at 04/14/22 1048    albuterol-ipratropium (DUO-NEB) 2.5 MG-0.5 MG/3 ML, 3 mL, Nebulization, Q6H PRN, Catrina Soto MD    ferrous sulfate 300 mg (60 mg iron)/5 mL oral syrup 300 mg, 300 mg, Oral, BID, Catrina Soto MD, 300 mg at 04/14/22 0847    acetaminophen (TYLENOL) solution 650 mg, 650 mg, Per NG tube, Q6H PRN, Israel Soto MD, 650 mg at 04/14/22 0768    Current Outpatient Medications   Medication Instructions    acidophilus-pectin, citrus 25 million cell -100 mg tab tablet 2 Tablets, Oral, DAILY    amLODIPine (NORVASC) 5 mg, Oral, DAILY    artificial saliva (MOUTH KOTE) spra 1 Spray, Oral, 3 TIMES DAILY    aspirin delayed-release 81 mg tablet Oral, DAILY    atorvastatin (LIPITOR) 20 mg tablet TAKE 1 TABLET BY MOUTH EVERY NIGHT    brimonidine (ALPHAGAN) 0.2 % ophthalmic solution 1 Drop, Both Eyes, 3 TIMES DAILY    ferrous sulfate 325 mg, Oral, 2 TIMES DAILY    gabapentin (NEURONTIN) 300 mg, Oral, 2 TIMES DAILY    insulin glargine (LANTUS) 50 Units, SubCUTAneous, DAILY    latanoprost (XALATAN) 0.005 % ophthalmic solution 1 Drop, Right Eye, EVERY EVENING    lidocaine (XYLOCAINE) 4 % (40 mg/mL) topical solution 1 mL, Topical, AS NEEDED    metoprolol succinate (TOPROL-XL) 50 mg, Oral, 2 TIMES DAILY         Signed By: Carlee Seip, MD     April 14, 2022

## 2022-04-14 NOTE — PROGRESS NOTES
PROGRESS NOTE      Chief Complaints: Patient examined this morning. HPI and  Objective:  Patient lying quietly without any distress. Patient has no fever last night. Patient is nonverbal.  I examined the patient's left foot. Great toe gangrene has extended more towards to metatarsal level. Review of Systems:  Unable to assess due to mental status  EXAM:  Visit Vitals  BP (!) 167/64 (BP 1 Location: Right upper arm, BP Patient Position: At rest;Lying)   Pulse 78   Temp 98.2 °F (36.8 °C)   Resp 18   Ht 5' 8\" (1.727 m)   Wt 191 lb (86.6 kg)   SpO2 98%   BMI 29.04 kg/m²       Patient is awake   Head and neck atraumatic, normocephalic. ENT: No hoarse voice  Cardiac system regular rate rhythm. Pulmonary no audible wheeze  Chest wall excursion normal with respiration cycle  Abdomen is soft not particularly distended. Neurologically nonfocal.  Skin is warm and moist.  Psychosocial: Cooperative. Vascular examination as previously noted no changes. Recent Results (from the past 24 hour(s))   GLUCOSE, POC    Collection Time: 04/13/22 11:44 AM   Result Value Ref Range    Glucose (POC) 380 (H) 65 - 117 mg/dL    Performed by Toribio Greenberg, POC    Collection Time: 04/13/22  5:58 PM   Result Value Ref Range    Glucose (POC) 358 (H) 65 - 117 mg/dL    Performed by Chencho Poole    GLUCOSE, POC    Collection Time: 04/13/22  8:52 PM   Result Value Ref Range    Glucose (POC) 320 (H) 65 - 117 mg/dL    Performed by Yue Puri, POC    Collection Time: 04/14/22 12:43 AM   Result Value Ref Range    Glucose (POC) 333 (H) 65 - 117 mg/dL    Performed by Vinita Weir    GLUCOSE, POC    Collection Time: 04/14/22  5:37 AM   Result Value Ref Range    Glucose (POC) 313 (H) 65 - 117 mg/dL    Performed by Margarito Mercado        ASSESSMENT:   Patient is 78 y.o. with diagnosis of :  Active Problems:    Hyperkalemia (4/4/2022)      UTI (urinary tract infection) (4/4/2022)      Sepsis ([address] (4/4/2022)      RICARDO (acute kidney injury) ([facility] [address] (4/4/2022)      AMS (altered mental status) (4/4/2022)        PLAN:                 I will talk to patient's daughter about the wound condition on the left great toe. It appears to be a bit worse and necrosis extended to the metatarsal level. Recommending great toe amputation possible metatarsal bone amputation as well however patient is high risk for periprocedural complication. I will talk to patient: See what she says.

## 2022-04-14 NOTE — PROGRESS NOTES
Chart reviewed. Updated clinicals attached in [facility] to [facility]. CM will continue to follow patient and recs of medical team.    [id number]: Per JOSEISTO message from [facility], patient will need auth to return to facility. [phone]: Hospitalist note attached in [facility] to [facility].

## 2022-04-14 NOTE — PROGRESS NOTES
IMPRESSION:   1. Sepsis  2. Aspiration pneumonia  3. Urinary tract infection  4. Acute kidney injury  5. Hyperkalemia resolved  6. History of CVA with right-sided weakness  7. Anemia  8. Hypernatremia  9. Hyperkalemia resolved  10. Gangrene of the left great toe sacral decubiti ulcer      RECOMMENDATIONS/PLAN:   1. She is on Zosyn   2. Lasix on hold  3. ABG acceptable and chest x-ray no acute infiltrate  4. Chest x-ray no acute infiltrate  5. WBC elevated most likely secondary to steroids  6. Recieve packed RBC      [x] High complexity decision making was performed  [x] See my orders for details  HPI  77-year-old lady nursing home resident came in as she was found confused with altered mental status family noted that she was not acting properly she is in a nursing home unable to get any history to the patient she has significant past medical history of CVA right-sided weakness hypertension diabetes mellitus she is bedridden open eyes does not follow any command possibly septic so admitted, and critical care consult was called. PMH:  has a past medical history of Diabetes mellitus ([address] HTN (hypertension), Hyperlipidemia, Neuropathy, PAD (peripheral artery disease) ([address] and Sciatica. PSH:   has a past surgical history that includes hx other surgical (Bilateral); hx amputation (Right); hx other surgical; hx cervical fusion; hx vascular stent (Bilateral); and hx vascular stent (Bilateral). FHX: family history includes Cancer in her mother; Diabetes in her mother; Hypertension in her mother. SHX:  reports that she has never smoked. She has never used smokeless tobacco. She reports previous alcohol use. She reports that she does not use drugs.     ALL: No Known Allergies     MEDS:   [x] Reviewed - As Below   [] Not reviewed    Current Facility-Administered Medications   Medication    insulin glargine (LANTUS) injection 32 Units    artificial saliva (MOUTH KOTE) 1 Spray    gabapentin (NEURONTIN) capsule 300 mg    amLODIPine (NORVASC) tablet 5 mg    metoprolol tartrate (LOPRESSOR) tablet 50 mg    nitroglycerin (NITROBID) 2 % ointment 1 Inch    hydrALAZINE (APRESOLINE) tablet 50 mg    dextrose 5% infusion    [Held by provider] furosemide (LASIX) injection 40 mg    VANCOMYCIN INFORMATION NOTE    labetaloL (NORMODYNE;TRANDATE) 20 mg/4 mL (5 mg/mL) injection 20 mg    hydrALAZINE (APRESOLINE) 20 mg/mL injection 20 mg    0.9% sodium chloride infusion 250 mL    0.9% sodium chloride infusion 250 mL    insulin lispro (HUMALOG) injection    atorvastatin (LIPITOR) tablet 20 mg    latanoprost (XALATAN) 0.005 % ophthalmic solution 1 Drop    aspirin delayed-release tablet 81 mg    brimonidine (ALPHAGAN) 0.2 % ophthalmic solution 1 Drop    glucose chewable tablet 16 g    glucagon (GLUCAGEN) injection 1 mg    acetaminophen (TYLENOL) suppository 650 mg    polyethylene glycol (MIRALAX) packet 17 g    ondansetron (ZOFRAN ODT) tablet 4 mg    Or    ondansetron (ZOFRAN) injection 4 mg    heparin (porcine) injection 5,000 Units    piperacillin-tazobactam (ZOSYN) 3.375 g in 0.9% sodium chloride (MBP/ADV) 100 mL MBP    albuterol-ipratropium (DUO-NEB) 2.5 MG-0.5 MG/3 ML    ferrous sulfate 300 mg (60 mg iron)/5 mL oral syrup 300 mg    acetaminophen (TYLENOL) solution 650 mg      MAR reviewed and pertinent medications noted or modified as needed   Current Facility-Administered Medications   Medication    insulin glargine (LANTUS) injection 32 Units    artificial saliva (MOUTH KOTE) 1 Spray    gabapentin (NEURONTIN) capsule 300 mg    amLODIPine (NORVASC) tablet 5 mg    metoprolol tartrate (LOPRESSOR) tablet 50 mg    nitroglycerin (NITROBID) 2 % ointment 1 Inch    hydrALAZINE (APRESOLINE) tablet 50 mg    dextrose 5% infusion    [Held by provider] furosemide (LASIX) injection 40 mg    VANCOMYCIN INFORMATION NOTE    labetaloL (NORMODYNE;TRANDATE) 20 mg/4 mL (5 mg/mL) injection 20 mg    hydrALAZINE (APRESOLINE) 20 mg/mL injection 20 mg    0.9% sodium chloride infusion 250 mL    0.9% sodium chloride infusion 250 mL    insulin lispro (HUMALOG) injection    atorvastatin (LIPITOR) tablet 20 mg    latanoprost (XALATAN) 0.005 % ophthalmic solution 1 Drop    aspirin delayed-release tablet 81 mg    brimonidine (ALPHAGAN) 0.2 % ophthalmic solution 1 Drop    glucose chewable tablet 16 g    glucagon (GLUCAGEN) injection 1 mg    acetaminophen (TYLENOL) suppository 650 mg    polyethylene glycol (MIRALAX) packet 17 g    ondansetron (ZOFRAN ODT) tablet 4 mg    Or    ondansetron (ZOFRAN) injection 4 mg    heparin (porcine) injection 5,000 Units    piperacillin-tazobactam (ZOSYN) 3.375 g in 0.9% sodium chloride (MBP/ADV) 100 mL MBP    albuterol-ipratropium (DUO-NEB) 2.5 MG-0.5 MG/3 ML    ferrous sulfate 300 mg (60 mg iron)/5 mL oral syrup 300 mg    acetaminophen (TYLENOL) solution 650 mg      PMH:  has a past medical history of Diabetes mellitus ([facility] HTN (hypertension), Hyperlipidemia, Neuropathy, PAD (peripheral artery disease) ([facility] [address] and Sciatica. PSH:   has a past surgical history that includes hx other surgical (Bilateral); hx amputation (Right); hx other surgical; hx cervical fusion; hx vascular stent (Bilateral); and hx vascular stent (Bilateral). FHX: family history includes Cancer in her mother; Diabetes in her mother; Hypertension in her mother. SHX:  reports that she has never smoked. She has never used smokeless tobacco. She reports previous alcohol use. She reports that she does not use drugs. ROS:  Unable to obtain barely unresponsive    Hemodynamics:    CO:    CI:    CVP:    SVR:   PAP Systolic:    PAP Diastolic:    PVR:    BG02:        Ventilator Settings:      Mode Rate TV Press PEEP FiO2 PIP Min. Vent                              Vital Signs: Telemetry:    normal sinus rhythm Intake/Output:   Visit Vitals  BP (!) 167/64 (BP 1 Location: Right upper arm, BP Patient Position:  At rest;Lying)   Pulse 78   Temp 98.2 °F (36.8 °C)   Resp 18   Ht 5' 8\" (1.727 m)   Wt 86.6 kg (191 lb)   SpO2 98%   BMI 29.04 kg/m²       Temp (24hrs), Av.7 °F (37.1 °C), Min:98.2 °F (36.8 °C), Max:99.7 °F (37.6 °C)        O2 Device: None (Room air) O2 Flow Rate (L/min): 0 l/min       Wt Readings from Last 4 Encounters:   04/10/22 86.6 kg (191 lb)   22 82.2 kg (181 lb 3.5 oz)   20 84.4 kg (186 lb)   20 84.8 kg (187 lb)          Intake/Output Summary (Last 24 hours) at 2022 1045  Last data filed at 2022 0551  Gross per 24 hour   Intake 2628 ml   Output 2850 ml   Net -222 ml       Last shift:      No intake/output data recorded. Last 3 shifts:  1901 -  0700  In: 200   Out: 4800 [Urine:4100; Drains:700]       Physical Exam:     General: Open eyes but does not follow any command  HEENT: NCAT, poor dentition, lips and mucosa dry  Eyes: anicteric; conjunctiva clear  Neck: no nodes, trach midline; no accessory MM use. Chest: no deformity,   Cardiac: R regular; no murmur;   Lungs: distant breath sounds; no wheezes  Abd: soft, NT, hypoactive BS  Ext: no edema; no joint swelling;  No clubbing  : NO giraldo, clear urine  Neuro: She only moves her left arm  Psych- unable to assess  Skin: warm, dry, no cyanosis;   Pulses: 1-2+ Bilateral pedal, radial  Capillary: brisk; pale      DATA:    MAR reviewed and pertinent medications noted or modified as needed  MEDS:   Current Facility-Administered Medications   Medication    insulin glargine (LANTUS) injection 32 Units    artificial saliva (MOUTH KOTE) 1 Spray    gabapentin (NEURONTIN) capsule 300 mg    amLODIPine (NORVASC) tablet 5 mg    metoprolol tartrate (LOPRESSOR) tablet 50 mg    nitroglycerin (NITROBID) 2 % ointment 1 Inch    hydrALAZINE (APRESOLINE) tablet 50 mg    dextrose 5% infusion    [Held by provider] furosemide (LASIX) injection 40 mg    VANCOMYCIN INFORMATION NOTE    labetaloL (NORMODYNE;TRANDATE) 20 mg/4 mL (5 mg/mL) injection 20 mg    hydrALAZINE (APRESOLINE) 20 mg/mL injection 20 mg    0.9% sodium chloride infusion 250 mL    0.9% sodium chloride infusion 250 mL    insulin lispro (HUMALOG) injection    atorvastatin (LIPITOR) tablet 20 mg    latanoprost (XALATAN) 0.005 % ophthalmic solution 1 Drop    aspirin delayed-release tablet 81 mg    brimonidine (ALPHAGAN) 0.2 % ophthalmic solution 1 Drop    glucose chewable tablet 16 g    glucagon (GLUCAGEN) injection 1 mg    acetaminophen (TYLENOL) suppository 650 mg    polyethylene glycol (MIRALAX) packet 17 g    ondansetron (ZOFRAN ODT) tablet 4 mg    Or    ondansetron (ZOFRAN) injection 4 mg    heparin (porcine) injection 5,000 Units    piperacillin-tazobactam (ZOSYN) 3.375 g in 0.9% sodium chloride (MBP/ADV) 100 mL MBP    albuterol-ipratropium (DUO-NEB) 2.5 MG-0.5 MG/3 ML    ferrous sulfate 300 mg (60 mg iron)/5 mL oral syrup 300 mg    acetaminophen (TYLENOL) solution 650 mg        Labs:    Recent Labs     04/13/22  0401 04/12/22  0711   WBC 19.8* 18.9*   HGB 10.3* 11.2*    383     Recent Labs     04/14/22  0738 04/13/22  0401 04/12/22  0711   * 154* 156*   K 5.0 4.8 4.4   * 125* 126*   CO2 27 27 28   * 359* 369*   BUN 56* 69* 84*   CREA 1.19* 1.22* 1.33*   CA 8.3* 8.3* 8.3*   ALB 1.6* 1.6* 1.7*   ALT 41 38 40     No results for input(s): PH, PCO2, PO2, HCO3, FIO2 in the last 72 hours. No results for input(s): CPK, CKNDX, TROIQ in the last 72 hours. No lab exists for component: CPKMB  No results found for: BNPP, BNP   Lab Results   Component Value Date/Time    Culture result:  04/10/2022 08:00 AM     One of four bottles has been flagged positive by instrument. Bottle has been sent to [address] [place] [address] laboratory to assess for possible growth. No organisms seen on Gram stain. Multiple subcultures are in progress.  No growth thus far    Culture result: Pseudomonas aeruginosa (A) 04/07/2022 02:00 PM    Culture result: No growth 7 days 04/03/2022 10:43 PM     Lab Results   Component Value Date/Time    TSH 2.880 12/12/2016 10:13 AM        Imaging:    Results from Hospital Encounter encounter on 04/03/22    XR CHEST PORT    Narrative  Chest, frontal view, 4/11/2022    History: CHF. Comparison: Chest 4/9/2022. Findings: Surgical hardware at the cervical spine is incompletely imaged. The  cardiac silhouette is within normal limits. The lungs are underexpanded. No  hydrostatic edema is present. No focal consolidation, pleural effusions or  pneumothorax is identified. Degenerative changes are present in the shoulders  and thoracic spine. Impression  No acute pulmonary process. Results from [place] encounter on 04/03/22    CT HEAD WO CONT    Narrative  PROCEDURE: CT HEAD WO CONT    HISTORY:Altered mental status    COMPARISON:Head CT 3 March 2022    Department policy stipulates all CT scans at this facility are performed using  dose reduction optimization techniques as appropriate to the performed exam,  including the following: Automated exposure control, adjustments of the mA  and/or KVP according to the patient size, and the use of iterative  reconstruction technique. TECHNIQUE: Without IV contrast    Bones/extracranial soft tissues:  Maxillary sinuses and right frontal sinus  remains nearly completely opacified. Extra-axial spaces:  No hemorrhage, mass or focal fluid collection. Ventricles/sulci:  There is mild dilatation of the ventricles. Sulci are  prominent. Brain parenchyma: An area of encephalomalacia in the left frontal lobe is  showing chronic evolution without evidence of hemorrhagic conversion. No other  focal lesions identified. No mass effect. There is good gray-white differentiation. There are  inhomogeneous areas of mildly decreased density in periventricular white matter. Impression  Chronic changes which appear stable. No acute or active intracranial process.   Chronic paranasal sinus disease

## 2022-04-14 NOTE — PROGRESS NOTES
Vancomycin Dosing Consult  Day #7 of vancomycin therapy  Consult ordered by Dr. Sarah Maxwell for this 78 y.o. female for indication of sepsis.   Antibiotic regimen: Vancomycin + Zosyn    Temp (24hrs), Av.7 °F (37.1 °C), Min:98.2 °F (36.8 °C), Max:99.7 °F (37.6 °C)    Recent Labs     22  0738 22  0401 22  0711   WBC  --  19.8* 18.9*   CREA 1.19* 1.22* 1.33*   BUN 56* 69* 84*     Est CrCl: 45-50 ml/min; UO: 1.4 mL/kg/hr  Concomitant nephrotoxic drugs: Furosemide (held)    Cultures:   4/3 Blood: NG, final   Urine: >100,000 CFU/mL Pseudomonas aeruginosa (susceptible to Zosyn), final  4/10 Blood:  bottles flagged positive, preliminary    MRSA Swab: Not detected     Target range: Trough 10-15 mcg/mL    Recent level history:  Date/Time Dose & Interval Measured Level (mcg/mL)    @ 0401 Held 13.0    @ 0738 500 mg x 1 13.6        Assessment/Plan:   Last febrile   WBC, CRP, and procal increased today  SCr continues to trend down, approaching baseline; will continue to pulse dose today but may be able to schedule a maintenance dose if renal function stabilizes  Will give 500 mg x 1 today  Random level tomorrow morning  Antimicrobial stop date TBD

## 2022-04-14 NOTE — PROGRESS NOTES
Progress Note    Patient: Julio Alves MRN: [id number]  SSN: [id number]    YOB: 1945  Age: 78 y.o. Sex: female      Admit Date: 4/3/2022    LOS: 10 days     Subjective:   Patient followed for sepsis with UTI secondary to Pseudomonas sensitive to Zosyn. She is now afebrile with decreasing WBC, procal and CRP. She has been transferred out of the ICU. Her CXR is unremarkable and she is now on room air. Blood cultures negative so far. Patient lying in bed, appears to be resting comfortably, remains nonverbal.   Daughters at the bedside. Objective:     Vitals:    04/13/22 2119 04/13/22 2300 04/14/22 0606 04/14/22 0729   BP: (!) 146/66 (!) 140/58 (!) 153/67 (!) 167/64   Pulse: 78 74 78 78   Resp:  20  18   Temp:  98.9 °F (37.2 °C) 99.7 °F (37.6 °C) 98.2 °F (36.8 °C)   SpO2: 98% 98% 97% 98%   Weight:       Height:            Intake and Output:  Current Shift: 04/14 0701 - 04/14 1900  In: -   Out: 400 [Urine:400]  Last three shifts: 04/12 1901 - 04/14 0700  In: 4110   Out: 4800 [Urine:4100; Drains:700]    Physical Exam:   Vitals and nursing note reviewed. Constitutional:       General: She is not in acute distress. Appearance: She is ill-appearing. HENT: eyes closed, Room Air   Cardiovascular:      Rate and Rhythm: Normal rate and regular rhythm. Heart sounds: No murmur heard. Pulmonary:      Effort: Pulmonary effort is normal.      Breath sounds: Normal breath sounds. Abdominal:      General: Bowel sounds are normal.      Palpations: Abdomen is soft. Tenderness: There is no abdominal tenderness. Comments: PEG tube in place, site unremarkable, tube feeding in progress  Genitourinary:     Comments: Bell catheter with moderate urine sediment  Flexiseal with dark brown watery stool  Musculoskeletal:      Right lower leg: No edema. Left lower leg: No edema.       Comments: Left great toe with dry gangrene, dark and shrunken   Skin: Stage 2 sacral wound Findings: No rash. Neurological:      Mental Status: She is alert. Comments: Unable to assess   Psychiatric:      Comments: Unable to assess      Lab/Data Review:     WBC 19,800    Procal 2.42 < 2.28 <1.93 <1.64 <3.37 <3.78 <4.78 <6.93  CRP 7.65 <3.14 <2.33 <3.44 <11.50 < 14.50 <16.10 <21.40 <28.40    Blood cultures (4/3) No growth FINAL  Blood cultures (4/10) 1 of  4 bottles flagged positive, No organisms seen  Urine culture (4/7) >100,000 cfu/ml Pseudomonas aeruginosa       CXR (4/11) No acute pulmonary process    Assessment:     Active Problems:    Hyperkalemia (4/4/2022)      UTI (urinary tract infection) (4/4/2022)      Sepsis ([facility] [address] (4/4/2022)      RICARDO (acute kidney injury) ([facility] [address] (4/4/2022)      AMS (altered mental status) (4/4/2022)    1. Sepsis with fever and leukocytosis, elevated procal and CRP, resolving, on empiric IV Vancomycin  2. UTI with marked pyuria and bacteriuria, secondary to Pseudomonas aeruginosa, Day #11 IV Zosyn  3. Altered mentals status, multifactorial including sepsis  4. Uncontrolled diabetes mellitus with hyperglycemia  5. Chronic maxillary sinusitis  6. ASCVD with prior stroke  7. Hepatitis C antibody positive, HCV RNA negative  8. 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 Sacral wound, Stage 2, no evidence of gross infection  10. Acute hypoxic respiratory failure, resolved  11. Diarrhea, presumed secondary to tube feeding     Comment:  WBC, procal and CRP increased further today. Concern now for possible Candida superinfection (now off Fluconazole). Plan:   1. Continue IV Zosyn   2. Discontinue Vancomycin  3. Follow-up blood cultures  4. In am, repeat CBC, procal and CRP   5.  Repeat urinalysis with reflex culture       Signed By: Binu Nguyễn MD     April 14, 2022

## 2022-04-14 NOTE — PROGRESS NOTES
Renal Daily Progress Note    Admit Date: 4/3/2022      Subjective:   Her eyes are open. She looks at the examiner. Noncommunicative. She has a rectal tube in place. Sugars are still in the 300s.     Current Facility-Administered Medications   Medication Dose Route Frequency    vancomycin (VANCOCIN) 500 mg in 0.9% sodium chloride (MBP/ADV) 100 mL MBP  500 mg IntraVENous ONCE    [START ON 4/15/2022] Vancomycin - Random level to be drawn 4/15 @ 1100   Other ONCE    insulin glargine (LANTUS) injection 32 Units  32 Units SubCUTAneous BID    artificial saliva (MOUTH KOTE) 1 Spray  1 Spray Oral PRN    gabapentin (NEURONTIN) capsule 300 mg  300 mg Oral QHS    amLODIPine (NORVASC) tablet 5 mg  5 mg Oral DAILY    metoprolol tartrate (LOPRESSOR) tablet 50 mg  50 mg Oral BID    nitroglycerin (NITROBID) 2 % ointment 1 Inch  1 Inch Topical Q6H    hydrALAZINE (APRESOLINE) tablet 50 mg  50 mg Oral TID    dextrose 5% infusion  100 mL/hr IntraVENous CONTINUOUS    [Held by provider] furosemide (LASIX) injection 40 mg  40 mg IntraVENous DAILY    VANCOMYCIN INFORMATION NOTE   Other Rx Dosing/Monitoring    labetaloL (NORMODYNE;TRANDATE) 20 mg/4 mL (5 mg/mL) injection 20 mg  20 mg IntraVENous Q4H PRN    hydrALAZINE (APRESOLINE) 20 mg/mL injection 20 mg  20 mg IntraVENous Q6H PRN    0.9% sodium chloride infusion 250 mL  250 mL IntraVENous PRN    insulin lispro (HUMALOG) injection   SubCUTAneous Q6H    atorvastatin (LIPITOR) tablet 20 mg  20 mg Oral QHS    latanoprost (XALATAN) 0.005 % ophthalmic solution 1 Drop  1 Drop Right Eye QPM    aspirin delayed-release tablet 81 mg  81 mg Oral DAILY    brimonidine (ALPHAGAN) 0.2 % ophthalmic solution 1 Drop  1 Drop Both Eyes TID    glucose chewable tablet 16 g  4 Tablet Oral PRN    glucagon (GLUCAGEN) injection 1 mg  1 mg IntraMUSCular PRN    acetaminophen (TYLENOL) suppository 650 mg  650 mg Rectal Q6H PRN    polyethylene glycol (MIRALAX) packet 17 g  17 g Oral DAILY PRN  ondansetron (ZOFRAN ODT) tablet 4 mg  4 mg Oral Q8H PRN    Or    ondansetron (ZOFRAN) injection 4 mg  4 mg IntraVENous Q6H PRN    heparin (porcine) injection 5,000 Units  5,000 Units SubCUTAneous Q8H    piperacillin-tazobactam (ZOSYN) 3.375 g in 0.9% sodium chloride (MBP/ADV) 100 mL MBP  3.375 g IntraVENous Q8H    albuterol-ipratropium (DUO-NEB) 2.5 MG-0.5 MG/3 ML  3 mL Nebulization Q6H PRN    ferrous sulfate 300 mg (60 mg iron)/5 mL oral syrup 300 mg  300 mg Oral BID    acetaminophen (TYLENOL) solution 650 mg  650 mg Per NG tube Q6H PRN        Review of Systems    ROS   Obtainable  Objective:     Patient Vitals for the past 8 hrs:   BP Temp Pulse Resp SpO2   04/14/22 0729 (!) 167/64 98.2 °F (36.8 °C) 78 18 98 %   04/14/22 0606 (!) 153/67 99.7 °F (37.6 °C) 78 -- 97 %     04/14 0701 - 04/14 1900  In: -   Out: 400 [Urine:400]  04/12 1901 - 04/14 0700  In: 4110   Out: 4800 [Urine:4100; Drains:700]    Physical Exam:   Physical Exam  Cardiovascular:      Rate and Rhythm: Regular rhythm. Pulmonary:      Breath sounds: Normal breath sounds. Abdominal:      General: Bowel sounds are normal.      Palpations: Abdomen is soft. Comments: On PEG feeding   Skin:     General: Skin is warm. Absent toes on the right foot        XR CHEST PORT   Final Result   No acute pulmonary process. XR CHEST PORT   Final Result   No significant change. XR CHEST PORT   Final Result      XR CHEST PORT   Final Result   No acute cardiopulmonary abnormality. .       XR CHEST PORT   Final Result      CT HEAD WO CONT   Final Result   Chronic changes which appear stable. No acute or active intracranial process. Chronic paranasal sinus disease         XR CHEST PORT   Final Result   No acute findings.            Data Review   Recent Labs     04/14/22  1145 04/13/22  0401 04/12/22  0711   WBC 18.4* 19.8* 18.9*   HGB 9.8* 10.3* 11.2*   HCT 32.5* 34.6* 37.8    327 383     Recent Labs     04/14/22  0789 04/13/22  0401 04/12/22  0711   * 154* 156*   K 5.0 4.8 4.4   * 125* 126*   CO2 27 27 28   * 359* 369*   BUN 56* 69* 84*   CREA 1.19* 1.22* 1.33*   CA 8.3* 8.3* 8.3*   ALB 1.6* 1.6* 1.7*   ALT 41 38 40     No components found for: GLPOC  No results for input(s): PH, PCO2, PO2, HCO3, FIO2 in the last 72 hours. No results for input(s): INR, INREXT, INREXT, INREXT in the last 72 hours. Assessment:           Active Problems:    Hyperkalemia (4/4/2022)      UTI (urinary tract infection) (4/4/2022)      Sepsis ([facility] [address] (4/4/2022)      RICARDO (acute kidney injury) ([facility] [address] (4/4/2022)      AMS (altered mental status) (4/4/2022)    Acute kidney injury on CKD. Creatinine down to 1.19 mg today. This should place her in stage III A    Hypernatremia with a free water deficit of 4.2 L. Sodium is better. Diarrhea and obligatory fluid loss due to hyperglycemia contributing to the hyponatremia. Leukocytosis -improving. CVA    Pseudomonas aeruginosa urinary tract infection    Diabetes mellitus uncontrolled which is worsening the hypernatremia due to obligatory water loss. Plan:   Water flushes via the PEG at  225 mL of water every 4 hours. On IV D5W to 100 mL/h   Increase glargine to 32 units twice daily to correct hyperglycemia. Discussed with patient's family at bedside. Once the D5W is discontinued, the dose of glargine will have to be decreased.

## 2022-04-14 NOTE — PROGRESS NOTES
RN notified by nurse manager, pt has a visitor under the age of 13years old and per policy is not allowed. Nurse manager notified guardian and family left with no issues.        Jannet Coello RN

## 2022-04-14 NOTE — PROGRESS NOTES
Comprehensive Nutrition Assessment    Type and Reason for Visit: Reassess (Interim)    Nutrition Recommendations/Plan:   Continue NPO, PEG feeds as Glucerna 1.5 Mahad at goal rate 60 mL/hr, continuous. Flush with 220 mL water Q4H. Provides 2160 kcal(100%), 119 gm PRO(105%), 2472 mL H20(102%). D5 @ 100 mL/hr [408 kcal (106%). If BGs unimproved  w/ SSI change consider IVF w/o additional kcal.    Nutrition Assessment:   Admitted for AMS 2/2 UTI. RD familiar with pt from last admit for CVA c/b by hematuria and gangrenous L foot. PEG in place, TF started (4/4). Hypertensive and tachypnic this AM, transferred to ICU. Per ICU RN, pt TF held all day d/t concern for aspiration pna aeb fever (102.2F Tmax) though CXR shows lungs are clear. Discussed restarting TF as able, no changes in regimen. Noted that TF not advanced past 50ml/hr while pt on medical unit, unsure why. (4/11) Pt in bed on medical floor s/p transfer from ICU today; TF to resume s/p meds administration per Nsg. Noted water flushes increased to 350 mL Q4H w/o sig. change in lytes and TF remains as Glucerna and SSI; consider Diabetes Insipidus vs dehydration. Observed on IVF(D5)? Will adjust flush and monitor lytes. (4/14)TF running as Gluc 1.5 at goal rate w/o report of intolerance. TF continues to meet >80% of EENs. Consistent elevated BGs trended- D5 noted to be increased? Discussed w/ Nephro, SSI to be adjusted for BG management. RD rec's continuing regimen, manage BGs w/ SSI. Labs: Na 152, Cl 122, , BUN 56, Cr 1.19, GFR 54, Ca 8.3, AST 57, TP 5.7, Alb 1.6, POC -380 mg/dL. Meds: D5 @100 mL/hr, FerrouSul, zosyn, lopressor, humalog, lantus, hydralazine, lipitor, norvasc.      Malnutrition Assessment:  Malnutrition Status:  No malnutrition    Context:  Chronic illness       Estimated Daily Nutrient Needs:  Energy (kcal): 2023-2427kcals (25-30kcals/kg consider bedbound 2/2 CVA and wounds); Weight Used for Energy Requirements: Current  Protein (g): 97-113g (1.2-1.4g/kg for wounds); Weight Used for Protein Requirements: Current  Fluid (ml/day): 4914-1874VB; Method Used for Fluid Requirements: 1 ml/kcal    Nutrition Related Findings:   TF continues at goal rate w/o N/V/D/C. Urine KL=6527 mL noted; observed +450 mL after emptying. Rectal tube in place-dark stool? No edema noted. Wounds:    Unstageable,Deep tissue injury,Moisture associate skin damage,Diabetic ulcer (Unstageable- Buttocks; MASD- L upper leg; DTI L heel; Amputated R Great toe; L great toe black)       Current Nutrition Therapies:  DIET NPO  ADULT TUBE FEEDING PEG; Diabetic; Delivery Method: Continuous; Continuous Initial Rate (mL/hr): 60; Continuous Advance Tube Feeding: No; Water Flush Volume (mL): 220; Water Flush Frequency: Q 4 hours    Anthropometric Measures:  · Height:  5' 8\" (172.7 cm)  · Current Body Wt:  86.6 kg (190 lb 14.7 oz) (4/10)   · Admission Body Wt:  178 lb 5.6 oz (4/4)    · Usual Body Wt:   (elkin)     · Ideal Body Wt:  140 lbs:  136.4 %   · Adjusted Body Weight:   ; Weight Adjustment for: No adjustment   · Adjusted BMI:       · BMI Category: Overweight (BMI 25.0-29. 9)       Nutrition Diagnosis:   · Biting/chewing (masticatory) difficulty,Swallowing difficulty related to cognitive or neurological impairment (CVA) as evidenced by nutrition support-enteral nutrition    Nutrition Interventions:   Food and/or Nutrient Delivery: Continue NPO,Continue tube feeding,IV fluid delivery  Nutrition Education and Counseling: No recommendations at this time  Coordination of Nutrition Care: Continue to monitor while inpatient    Goals:  Meet >/=75% of EENs in >5 days, Wt maintenance within +/-0.5kg in >5 days, Signs of wound healing per nsg assessment in >5 days       Nutrition Monitoring and Evaluation:   Behavioral-Environmental Outcomes: None identified  Food/Nutrient Intake Outcomes: Enteral nutrition intake/tolerance,IVF intake  Physical Signs/Symptoms Outcomes: Biochemical data,Hemodynamic status,Weight,GI status,Fluid status or edema    Discharge Planning:    Enteral nutrition     Electronically signed by Alejo Garland RD on 4/14/2022 at 10:45 AM    Contact: ext. [phone] or Angelia.

## 2022-04-14 NOTE — PROGRESS NOTES
PROGRESS NOTE    Patient: Lavell Wu MRN: [id number]  SSN: [id number]    YOB: 1945  Age: 78 y.o. Sex: female      Admit Date: 4/3/2022    LOS: 10 days       Subjective     Chief Complaint   Patient presents with    Altered mental status       HPI: Patient is a 78y.o. 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 04/05   Patient is seen at bedside with daughter and nephew today. Patient is in distress due to pain and daughter notes that patient gets Neurontin TID for neuropathy daily. Otherwise, patient is still receiving enteric feeding through PEG tube and getting IVF. Patient was seen by nephrology yesterday. Recommends obtaining renal panel and continuing IVF. Patient was seen by ID yesterday. Recommends continuing IV vancomycin for urosepsis. Patient was seen by pulmonology today. Recommends PRBC transfusion. Labs indicate WBC 16.9, Hgb 6.3, Na 148, Cl 120, BUN 72, Cr 1.71, Ca 8, CRP 28.4, pro-crarie 6.93 and .       04/06   Patient is seen at bedside. Patient received 2 units of PRBC. Patient is on Zosyn. No new changes today. Patient seen by the vascular surgeon he recommend great toe amputation  And for sacral wound recommend Medihoney ointment     Labs show increasing Hgb of 9, decreasing WBC 14.7, Na 147, , BUN 55,   Cr 1.47, and CRP 21.4. CXR is unremarkable. 04/07  Patient is seen resting at bedside.  Patient received one unit of PRBC with increased Hgb of 10.6. No acute changes today. Patient is seen by nephrology who recommends free water flushes to correct hypernatremia. Labs show increasing Hgb of 10.6, WBC 15.1, pro-calcitonin 3.78, and CRP 16.1.    4/8    Patient had a rapid response this morning ABG and chest x-ray was ordered  Patient tachycardic tachypneic  ABG  pH 7.51 PCO2 31 PO2 65 bicarb 26 oxygen saturation 95% on room air    Chest x-ray shows no much changes    4/9    Patient resting the bed open eyes  On room air/breathing through the mouth    4/10    Patient open eyes not in distress breathing through her mouth  According to the nurse patient not sleep all day and all night yesterday      4/11  Patient is seen at bedside. She is awake but not in any distress. Patient is on zosyn. PEG tube in place. Urine culture positive for Pseudomonas. CXR shows no focal changes. Labs remarkable for WBC  22.3, Hgb 10.5, Na 153, , Cr 1.55 BUN 94, pro-calcitonin 1.64, and CRP 3.33.    4/12  Patient is on Vancomycin, zosyn and D5W. PEG tube in place. Spoke to daughter at bedside. She has no new complaints. Labs remarkable for:  WBC  18.9,  Cr 1.33, BUN 84,  and CRP 2.33 which are decreasing. Hgb 11.2, Na-corrected 158, , and pro-calcitonin 1.93 which are increasing. Seen by pulmonology. No new recommendations. Seen by GI. No new recommendations. Seen by nephrology. recommends IV D5W IVF to decrease hypernatremia. Seen by ID. Recommends discontinuing fluconazole, follow up blood cultures, and continuing IV zosyn and vancomycin. 4/13/2022  Patient is on Vancomycin, zosyn and D5W. PEG tube in place. Nephrology - recommends IV D5W IVF to decrease hypernatremia. Increase glargine to 25 units twice daily to correct hyperglycemia.  Once the D5W is discontinued, the dose of glargine will have to be decreased    ID - Continue IV Zosyn and Vancomycin; discontinue Vancomycin if WBC continues to decrease    Pulmonology - No new recommendations. GI - No new recommendations. CXR 4/11/2022 - No acute pulmonary process (no focal changes)     Labs remarkable for:  Blood Glucose 380  WBC  19.8  Hgb 10.3   Na-corrected 154  BUN 69  Cr 1.22  CRP 3.14  pro-calcitonin 2.28     4/14  Patient's family members are present at bedside with the patient. They notes that the patient was in distress this morning. Patient still on vancomycin and zosyn. PEG tube in place and patient is receiving enteral feedings. Seen by vascular surgeon who recommends great toe amputation. Labs remarkable for Na-corrected 154 improving, Cr 1.19 improving, pro-carrie 2.42,  and CRP 7.65. Review of Systems   Unable to perform ROS: Acuity of condition   All other systems reviewed and are negative. Unable to obtain. Objective     Visit Vitals  BP (!) 167/64 (BP 1 Location: Right upper arm, BP Patient Position: At rest;Lying)   Pulse 78   Temp 98.2 °F (36.8 °C)   Resp 18   Ht 5' 8\" (1.727 m)   Wt 191 lb (86.6 kg)   SpO2 98%   BMI 29.04 kg/m²    O2 Flow Rate (L/min): 0 l/min O2 Device: None (Room air)    Physical Exam:   Physical Exam  Constitutional: Open eyes do not follow any commands. Tracks with eyes. HENT:   Head: Normocephalic and atraumatic. Eyes: Pupils are equal, round, and reactive to light. EOM are normal.   Cardiovascular: Normal rate, regular rhythm and normal heart sounds. Pulmonary/Chest: Decreased breath sound   abdominal: Soft. Bowel sounds are normal. There is no abdominal tenderness. There is no rebound and no guarding. Musculoskeletal: Normal range of motion. Neurological: Open eyes do not follow any, right-sided weakness  PEG tube in place. Intake & Output:  Current Shift: No intake/output data recorded. Last three shifts: 04/12 1901 - 04/14 0700  In: 4110   Out: 4800 [Urine:4100; Drains:700]    Lab/Data Review:  Lab results reviewed.  For significant abnormal values and values requiring intervention, see assessment and plan. 24 Hour Results:    Recent Results (from the past 24 hour(s))   GLUCOSE, POC    Collection Time: 04/13/22 11:44 AM   Result Value Ref Range    Glucose (POC) 380 (H) 65 - 117 mg/dL    Performed by Malinda 150, POC    Collection Time: 04/13/22  5:58 PM   Result Value Ref Range    Glucose (POC) 358 (H) 65 - 117 mg/dL    Performed by Malinda 150, POC    Collection Time: 04/13/22  8:52 PM   Result Value Ref Range    Glucose (POC) 320 (H) 65 - 117 mg/dL    Performed by [employer], POC    Collection Time: 04/14/22 12:43 AM   Result Value Ref Range    Glucose (POC) 333 (H) 65 - 117 mg/dL    Performed by Jovani Hoyos    GLUCOSE, POC    Collection Time: 04/14/22  5:37 AM   Result Value Ref Range    Glucose (POC) 313 (H) 65 - 117 mg/dL    Performed by Jazmín Donovan    PROCALCITONIN    Collection Time: 04/14/22  7:38 AM   Result Value Ref Range    Procalcitonin 2.42 (H) 0 ng/mL   VANCOMYCIN, RANDOM    Collection Time: 04/14/22  7:38 AM   Result Value Ref Range    Vancomycin, random 66.8 ug/mL   METABOLIC PANEL, COMPREHENSIVE    Collection Time: 04/14/22  7:38 AM   Result Value Ref Range    Sodium 152 (H) 136 - 145 mmol/L    Potassium 5.0 3.5 - 5.1 mmol/L    Chloride 122 (H) 97 - 108 mmol/L    CO2 27 21 - 32 mmol/L    Anion gap 3 (L) 5 - 15 mmol/L    Glucose 342 (H) 65 - 100 mg/dL    BUN 56 (H) 6 - 20 mg/dL    Creatinine 1.19 (H) 0.55 - 1.02 mg/dL    BUN/Creatinine ratio 47 (H) 12 - 20      GFR est AA 54 (L) >60 ml/min/1.73m2    GFR est non-AA 44 (L) >60 ml/min/1.73m2    Calcium 8.3 (L) 8.5 - 10.1 mg/dL    Bilirubin, total 0.2 0.2 - 1.0 mg/dL    AST (SGOT) 57 (H) 15 - 37 U/L    ALT (SGPT) 41 12 - 78 U/L    Alk.  phosphatase 83 45 - 117 U/L    Protein, total 5.7 (L) 6.4 - 8.2 g/dL    Albumin 1.6 (L) 3.5 - 5.0 g/dL    Globulin 4.1 (H) 2.0 - 4.0 g/dL    A-G Ratio 0.4 (L) 1.1 - 2.2 Imaging:    XR CHEST PORT   Final Result   No acute pulmonary process. XR CHEST PORT   Final Result   No significant change. XR CHEST PORT   Final Result      XR CHEST PORT   Final Result   No acute cardiopulmonary abnormality. .       XR CHEST PORT   Final Result      CT HEAD WO CONT   Final Result   Chronic changes which appear stable. No acute or active intracranial process. Chronic paranasal sinus disease         XR CHEST PORT   Final Result   No acute findings.              Assessment   Severe sepsis with UTI  UTI/Pseudomonas  Acute on chronic kidney disease  Hyperkalemia  Hypernatremia  CVA with right-sided weakness  Anemia of chronic disease  Gangrene of the left great toe  Sacral decubitus ulcer  Acute respiratory failure  Possible aspiration  Elevated BNP    Plan     Amlodipine 5 mg daily  Aspirin 81 mg daily  Lipitor 20 daily  Ferrous sulfate 300 twice a day  Gabapentin 300 mg once in the morning and at bedtime  Heparin 5000 units subcu every 8 hours  Hydralazine 50 mg 3 times a day  Lantus 22 units subcu twice a day  Metoprolol 50 mg twice a day  IV Zosyn 3.375 IV every 8 hours  Vancomycin per pharmacy protocol      Monitor renal function and sodium level      Pulmonary nephrology infectious disease and vascular surgical.  Schedule for amputation of the left great toe    Start PEG tube feeding with water flush   Start back on gabapentin at night only    Blood sugars running high secondary D5 at 75 cc/h as per nephrology  Transfer telemetry floor    Discussed with the patient's daughter at the bedside      Current Facility-Administered Medications:     insulin glargine (LANTUS) injection 32 Units, 32 Units, SubCUTAneous, BID, Israel Soto MD, 32 Units at 04/14/22 0848    artificial saliva (MOUTH KOTE) 1 Spray, 1 Spray, Oral, PRN, Israel Soto MD    gabapentin (NEURONTIN) capsule 300 mg, 300 mg, Oral, QHS, Israel Soto MD, 300 mg at 04/13/22 2127    amLODIPine (NORVASC) tablet 5 mg, 5 mg, Oral, DAILY, Israel Soto MD, 5 mg at 04/14/22 0848    metoprolol tartrate (LOPRESSOR) tablet 50 mg, 50 mg, Oral, BID, Israel Soto MD, 50 mg at 04/14/22 0848    nitroglycerin (NITROBID) 2 % ointment 1 Inch, 1 Inch, Topical, Q6H, Kandy Berry MD, 1 Inch at 04/14/22 0607    hydrALAZINE (APRESOLINE) tablet 50 mg, 50 mg, Oral, TID, Israel Soto MD, 50 mg at 04/14/22 0847    dextrose 5% infusion, 100 mL/hr, IntraVENous, CONTINUOUS, Kalpesh Lara MD, Last Rate: 100 mL/hr at 04/14/22 0357, 100 mL/hr at 04/14/22 0357    [Held by provider] furosemide (LASIX) injection 40 mg, 40 mg, IntraVENous, DAILY, Israel Soto MD, 40 mg at 04/09/22 1054    VANCOMYCIN INFORMATION NOTE, , Other, Rx Dosing/Monitoring, Jatinder Clemons MD    labetaloL (NORMODYNE;TRANDATE) 20 mg/4 mL (5 mg/mL) injection 20 mg, 20 mg, IntraVENous, Q4H PRN, Israel Soto MD, 20 mg at 04/10/22 2017    hydrALAZINE (APRESOLINE) 20 mg/mL injection 20 mg, 20 mg, IntraVENous, Q6H PRN, Israel Soto MD, 20 mg at 04/12/22 0257    0.9% sodium chloride infusion 250 mL, 250 mL, IntraVENous, PRN, Martínez Crespo MD    0.9% sodium chloride infusion 250 mL, 250 mL, IntraVENous, PRN, Adria Soto MD    insulin lispro (HUMALOG) injection, , SubCUTAneous, Q6H, Israel Soto MD, 5 Units at 04/14/22 0602    atorvastatin (LIPITOR) tablet 20 mg, 20 mg, Oral, QHS, Israel Soto MD, 20 mg at 04/13/22 2126    latanoprost (XALATAN) 0.005 % ophthalmic solution 1 Drop, 1 Drop, Right Eye, QPM, Israel Soto MD, 1 Drop at 04/13/22 1827    aspirin delayed-release tablet 81 mg, 81 mg, Oral, DAILY, Israel Soto MD, 81 mg at 04/14/22 0847    brimonidine (ALPHAGAN) 0.2 % ophthalmic solution 1 Drop, 1 Drop, Both Eyes, TID, Israel Soto MD, 1 Drop at 04/13/22 2127    glucose chewable tablet 16 g, 4 Tablet, Oral, PRN, Israel Soto MD    glucagon (GLUCAGEN) injection 1 mg, 1 mg, IntraMUSCular, PRN, Valencia Mercedes MD  [facility]  [DISCONTINUED] acetaminophen (TYLENOL) tablet 650 mg, 650 mg, Oral, Q6H PRN, 650 mg at 04/04/22 2246 **OR** acetaminophen (TYLENOL) suppository 650 mg, 650 mg, Rectal, Q6H PRN, Anayeli Soto MD    polyethylene glycol (MIRALAX) packet 17 g, 17 g, Oral, DAILY PRN, Anayeli Soto MD    ondansetron (ZOFRAN ODT) tablet 4 mg, 4 mg, Oral, Q8H PRN **OR** ondansetron (ZOFRAN) injection 4 mg, 4 mg, IntraVENous, Q6H PRN, Israel Soto MD    heparin (porcine) injection 5,000 Units, 5,000 Units, SubCUTAneous, Q8H, Israel Soto MD, 5,000 Units at 04/14/22 0848    piperacillin-tazobactam (ZOSYN) 3.375 g in 0.9% sodium chloride (MBP/ADV) 100 mL MBP, 3.375 g, IntraVENous, Q8H, Israel Soto MD, Last Rate: 25 mL/hr at 04/14/22 0207, 3.375 g at 04/14/22 0207    albuterol-ipratropium (DUO-NEB) 2.5 MG-0.5 MG/3 ML, 3 mL, Nebulization, Q6H PRN, Anayeli Soto MD    ferrous sulfate 300 mg (60 mg iron)/5 mL oral syrup 300 mg, 300 mg, Oral, BID, Israel Soto MD, 300 mg at 04/14/22 0847    acetaminophen (TYLENOL) solution 650 mg, 650 mg, Per NG tube, Q6H PRN, Israel Soto MD, 650 mg at 04/14/22 8758    Current Outpatient Medications   Medication Instructions    acidophilus-pectin, citrus 25 million cell -100 mg tab tablet 2 Tablets, Oral, DAILY    amLODIPine (NORVASC) 5 mg, Oral, DAILY    artificial saliva (MOUTH KOTE) spra 1 Spray, Oral, 3 TIMES DAILY    aspirin delayed-release 81 mg tablet Oral, DAILY    atorvastatin (LIPITOR) 20 mg tablet TAKE 1 TABLET BY MOUTH EVERY NIGHT    brimonidine (ALPHAGAN) 0.2 % ophthalmic solution 1 Drop, Both Eyes, 3 TIMES DAILY    ferrous sulfate 325 mg, Oral, 2 TIMES DAILY    gabapentin (NEURONTIN) 300 mg, Oral, 2 TIMES DAILY    insulin glargine (LANTUS) 50 Units, SubCUTAneous, DAILY    latanoprost (XALATAN) 0.005 % ophthalmic solution 1 Drop, Right Eye, EVERY EVENING    lidocaine (XYLOCAINE) 4 % (40 mg/mL) topical solution 1 mL, Topical, AS NEEDED    metoprolol succinate (TOPROL-XL) 50 mg, Oral, 2 TIMES DAILY         Signed By: Venus Lopez     April 14, 2022

## 2022-04-15 NOTE — PROGRESS NOTES
MD called and JP left to return, my call to floor. Nurse Manager Talha Duke made aware and stated she will follow up also.

## 2022-04-15 NOTE — PROGRESS NOTES
OT eval order received and acknowledged. Pt noted to be a LTC resident requiring total A at baseline for self care and functional transfers/mobility. Pt is unable to participate with therapy at this time. OT will discontinue orders. Please re-order if needs arise. Thank you.

## 2022-04-15 NOTE — PROGRESS NOTES
IMPRESSION:   1. Sepsis  2. Aspiration pneumonia  3. Clogged PEG tube  4. Urinary tract infection  5. Acute kidney injury  6. Hyperkalemia resolved  7. History of CVA with right-sided weakness  8. Anemia  9. Hypernatremia  10. Hyperkalemia resolved  11. Gangrene of the left great toe sacral decubiti ulcer      RECOMMENDATIONS/PLAN:   1. She is on Zosyn   2. Lasix on hold  3. GI to see for clogged PEG tube  4. ABG acceptable she is on room  5. Chest x-ray no acute infiltrate  6. WBC elevated most likely secondary to steroids  7. Recieve packed RBC      [x] High complexity decision making was performed  [x] See my orders for details  HPI  28-year-old lady nursing home resident came in as she was found confused with altered mental status family noted that she was not acting properly she is in a nursing home unable to get any history to the patient she has significant past medical history of CVA right-sided weakness hypertension diabetes mellitus she is bedridden open eyes does not follow any command possibly septic so admitted, and critical care consult was called. PMH:  has a past medical history of Diabetes mellitus ([address] HTN (hypertension), Hyperlipidemia, Neuropathy, PAD (peripheral artery disease) ([address] and Sciatica. PSH:   has a past surgical history that includes hx other surgical (Bilateral); hx amputation (Right); hx other surgical; hx cervical fusion; hx vascular stent (Bilateral); and hx vascular stent (Bilateral). FHX: family history includes Cancer in her mother; Diabetes in her mother; Hypertension in her mother. SHX:  reports that she has never smoked. She has never used smokeless tobacco. She reports previous alcohol use. She reports that she does not use drugs.     ALL: No Known Allergies     MEDS:   [x] Reviewed - As Below   [] Not reviewed    Current Facility-Administered Medications   Medication    Vancomycin - Random level to be drawn 4/15 @ 1100    gabapentin (NEURONTIN) capsule 300 mg    insulin glargine (LANTUS) injection 32 Units    artificial saliva (MOUTH KOTE) 1 Spray    amLODIPine (NORVASC) tablet 5 mg    metoprolol tartrate (LOPRESSOR) tablet 50 mg    nitroglycerin (NITROBID) 2 % ointment 1 Inch    hydrALAZINE (APRESOLINE) tablet 50 mg    dextrose 5% infusion    [Held by provider] furosemide (LASIX) injection 40 mg    VANCOMYCIN INFORMATION NOTE    labetaloL (NORMODYNE;TRANDATE) 20 mg/4 mL (5 mg/mL) injection 20 mg    hydrALAZINE (APRESOLINE) 20 mg/mL injection 20 mg    0.9% sodium chloride infusion 250 mL    insulin lispro (HUMALOG) injection    atorvastatin (LIPITOR) tablet 20 mg    latanoprost (XALATAN) 0.005 % ophthalmic solution 1 Drop    aspirin delayed-release tablet 81 mg    brimonidine (ALPHAGAN) 0.2 % ophthalmic solution 1 Drop    glucose chewable tablet 16 g    glucagon (GLUCAGEN) injection 1 mg    acetaminophen (TYLENOL) suppository 650 mg    polyethylene glycol (MIRALAX) packet 17 g    ondansetron (ZOFRAN ODT) tablet 4 mg    Or    ondansetron (ZOFRAN) injection 4 mg    heparin (porcine) injection 5,000 Units    piperacillin-tazobactam (ZOSYN) 3.375 g in 0.9% sodium chloride (MBP/ADV) 100 mL MBP    albuterol-ipratropium (DUO-NEB) 2.5 MG-0.5 MG/3 ML    ferrous sulfate 300 mg (60 mg iron)/5 mL oral syrup 300 mg    acetaminophen (TYLENOL) solution 650 mg      MAR reviewed and pertinent medications noted or modified as needed   Current Facility-Administered Medications   Medication    Vancomycin - Random level to be drawn 4/15 @ 1100    gabapentin (NEURONTIN) capsule 300 mg    insulin glargine (LANTUS) injection 32 Units    artificial saliva (MOUTH KOTE) 1 Spray    amLODIPine (NORVASC) tablet 5 mg    metoprolol tartrate (LOPRESSOR) tablet 50 mg    nitroglycerin (NITROBID) 2 % ointment 1 Inch    hydrALAZINE (APRESOLINE) tablet 50 mg    dextrose 5% infusion    [Held by provider] furosemide (LASIX) injection 40 mg    VANCOMYCIN INFORMATION NOTE    labetaloL (NORMODYNE;TRANDATE) 20 mg/4 mL (5 mg/mL) injection 20 mg    hydrALAZINE (APRESOLINE) 20 mg/mL injection 20 mg    0.9% sodium chloride infusion 250 mL    insulin lispro (HUMALOG) injection    atorvastatin (LIPITOR) tablet 20 mg    latanoprost (XALATAN) 0.005 % ophthalmic solution 1 Drop    aspirin delayed-release tablet 81 mg    brimonidine (ALPHAGAN) 0.2 % ophthalmic solution 1 Drop    glucose chewable tablet 16 g    glucagon (GLUCAGEN) injection 1 mg    acetaminophen (TYLENOL) suppository 650 mg    polyethylene glycol (MIRALAX) packet 17 g    ondansetron (ZOFRAN ODT) tablet 4 mg    Or    ondansetron (ZOFRAN) injection 4 mg    heparin (porcine) injection 5,000 Units    piperacillin-tazobactam (ZOSYN) 3.375 g in 0.9% sodium chloride (MBP/ADV) 100 mL MBP    albuterol-ipratropium (DUO-NEB) 2.5 MG-0.5 MG/3 ML    ferrous sulfate 300 mg (60 mg iron)/5 mL oral syrup 300 mg    acetaminophen (TYLENOL) solution 650 mg      PMH:  has a past medical history of Diabetes mellitus ([facility] [address] HTN (hypertension), Hyperlipidemia, Neuropathy, PAD (peripheral artery disease) ([facility] [address] and Sciatica. PSH:   has a past surgical history that includes hx other surgical (Bilateral); hx amputation (Right); hx other surgical; hx cervical fusion; hx vascular stent (Bilateral); and hx vascular stent (Bilateral). FHX: family history includes Cancer in her mother; Diabetes in her mother; Hypertension in her mother. SHX:  reports that she has never smoked. She has never used smokeless tobacco. She reports previous alcohol use. She reports that she does not use drugs. ROS:  Unable to obtain barely unresponsive    Hemodynamics:    CO:    CI:    CVP:    SVR:   PAP Systolic:    PAP Diastolic:    PVR:    HV71:        Ventilator Settings:      Mode Rate TV Press PEEP FiO2 PIP Min.  Vent                              Vital Signs: Telemetry:    normal sinus rhythm Intake/Output:   Visit Vitals  BP (!) 154/61 (BP 1 Location: Left upper arm, BP Patient Position: Lying; At rest)   Pulse 76   Temp 98.8 °F (37.1 °C)   Resp 18   Ht 5' 8\" (1.727 m)   Wt 86.6 kg (191 lb)   SpO2 96%   BMI 29.04 kg/m²       Temp (24hrs), Av.6 °F (37 °C), Min:98 °F (36.7 °C), Max:99 °F (37.2 °C)        O2 Device: None (Room air) O2 Flow Rate (L/min): 0 l/min       Wt Readings from Last 4 Encounters:   04/10/22 86.6 kg (191 lb)   22 82.2 kg (181 lb 3.5 oz)   20 84.4 kg (186 lb)   20 84.8 kg (187 lb)          Intake/Output Summary (Last 24 hours) at 4/15/2022 1044  Last data filed at 2022 2200  Gross per 24 hour   Intake 280 ml   Output 1700 ml   Net -1420 ml       Last shift:      No intake/output data recorded. Last 3 shifts:  1901 - 04/15 0700  In: 1659   Out: 4950 [Urine:4250; Drains:700]       Physical Exam:     General: Open eyes but does not follow any command  HEENT: NCAT, poor dentition, lips and mucosa dry  Eyes: anicteric; conjunctiva clear  Neck: no nodes, trach midline; no accessory MM use. Chest: no deformity,   Cardiac: R regular; no murmur;   Lungs: distant breath sounds; no wheezes  Abd: soft, NT, hypoactive BS  Ext: no edema; no joint swelling;  No clubbing  : NO giraldo, clear urine  Neuro: She only moves her left arm  Psych- unable to assess  Skin: warm, dry, no cyanosis;   Pulses: 1-2+ Bilateral pedal, radial  Capillary: brisk; pale      DATA:    MAR reviewed and pertinent medications noted or modified as needed  MEDS:   Current Facility-Administered Medications   Medication    Vancomycin - Random level to be drawn 4/15 @ 1100    gabapentin (NEURONTIN) capsule 300 mg    insulin glargine (LANTUS) injection 32 Units    artificial saliva (MOUTH KOTE) 1 Spray    amLODIPine (NORVASC) tablet 5 mg    metoprolol tartrate (LOPRESSOR) tablet 50 mg    nitroglycerin (NITROBID) 2 % ointment 1 Inch    hydrALAZINE (APRESOLINE) tablet 50 mg    dextrose 5% infusion    [Held by provider] furosemide (LASIX) injection 40 mg    VANCOMYCIN INFORMATION NOTE    labetaloL (NORMODYNE;TRANDATE) 20 mg/4 mL (5 mg/mL) injection 20 mg    hydrALAZINE (APRESOLINE) 20 mg/mL injection 20 mg    0.9% sodium chloride infusion 250 mL    insulin lispro (HUMALOG) injection    atorvastatin (LIPITOR) tablet 20 mg    latanoprost (XALATAN) 0.005 % ophthalmic solution 1 Drop    aspirin delayed-release tablet 81 mg    brimonidine (ALPHAGAN) 0.2 % ophthalmic solution 1 Drop    glucose chewable tablet 16 g    glucagon (GLUCAGEN) injection 1 mg    acetaminophen (TYLENOL) suppository 650 mg    polyethylene glycol (MIRALAX) packet 17 g    ondansetron (ZOFRAN ODT) tablet 4 mg    Or    ondansetron (ZOFRAN) injection 4 mg    heparin (porcine) injection 5,000 Units    piperacillin-tazobactam (ZOSYN) 3.375 g in 0.9% sodium chloride (MBP/ADV) 100 mL MBP    albuterol-ipratropium (DUO-NEB) 2.5 MG-0.5 MG/3 ML    ferrous sulfate 300 mg (60 mg iron)/5 mL oral syrup 300 mg    acetaminophen (TYLENOL) solution 650 mg        Labs:    Recent Labs     04/15/22  0728 04/14/22  1145 04/13/22  0401   WBC 15.9* 18.4* 19.8*   HGB 9.5* 9.8* 10.3*    263 327     Recent Labs     04/14/22  0738 04/13/22  0401   * 154*   K 5.0 4.8   * 125*   CO2 27 27   * 359*   BUN 56* 69*   CREA 1.19* 1.22*   CA 8.3* 8.3*   ALB 1.6* 1.6*   ALT 41 38     No results for input(s): PH, PCO2, PO2, HCO3, FIO2 in the last 72 hours. No results for input(s): CPK, CKNDX, TROIQ in the last 72 hours. No lab exists for component: CPKMB  No results found for: BNPP, BNP   Lab Results   Component Value Date/Time    Culture result:  04/10/2022 08:00 AM     One of four bottles has been flagged positive by instrument. Bottle has been sent to [facility] laboratory to assess for possible growth. No organisms seen on Gram stain. Multiple subcultures are in progress.  No growth thus far    Culture result: Pseudomonas aeruginosa (A) 04/07/2022 02:00 PM    Culture result: No growth 7 days 04/03/2022 10:43 PM     Lab Results   Component Value Date/Time    TSH 2.880 12/12/2016 10:13 AM        Imaging:    Results from [place] encounter on 04/03/22    XR CHEST PORT    Narrative  Chest, frontal view, 4/11/2022    History: CHF. Comparison: Chest 4/9/2022. Findings: Surgical hardware at the cervical spine is incompletely imaged. The  cardiac silhouette is within normal limits. The lungs are underexpanded. No  hydrostatic edema is present. No focal consolidation, pleural effusions or  pneumothorax is identified. Degenerative changes are present in the shoulders  and thoracic spine. Impression  No acute pulmonary process. Results from [place] encounter on 04/03/22    CT HEAD WO CONT    Narrative  PROCEDURE: CT HEAD WO CONT    HISTORY:Altered mental status    COMPARISON:Head CT 3 March 2022    Department policy stipulates all CT scans at this facility are performed using  dose reduction optimization techniques as appropriate to the performed exam,  including the following: Automated exposure control, adjustments of the mA  and/or KVP according to the patient size, and the use of iterative  reconstruction technique. TECHNIQUE: Without IV contrast    Bones/extracranial soft tissues:  Maxillary sinuses and right frontal sinus  remains nearly completely opacified. Extra-axial spaces:  No hemorrhage, mass or focal fluid collection. Ventricles/sulci:  There is mild dilatation of the ventricles. Sulci are  prominent. Brain parenchyma: An area of encephalomalacia in the left frontal lobe is  showing chronic evolution without evidence of hemorrhagic conversion. No other  focal lesions identified. No mass effect. There is good gray-white differentiation. There are  inhomogeneous areas of mildly decreased density in periventricular white matter. Impression  Chronic changes which appear stable. No acute or active intracranial process.   Chronic paranasal sinus disease

## 2022-04-15 NOTE — PROGRESS NOTES
PROGRESS NOTE    Patient: Jose Escobedo MRN: [id number]  SSN: [id number]    YOB: 1945  Age: 78 y.o. Sex: female      Admit Date: 4/3/2022    LOS: 11 days       Subjective     Chief Complaint   Patient presents with    Altered mental status       HPI: Patient is a 78y.o. 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 04/05   Patient is seen at bedside with daughter and nephew today. Patient is in distress due to pain and daughter notes that patient gets Neurontin TID for neuropathy daily. Otherwise, patient is still receiving enteric feeding through PEG tube and getting IVF. Patient was seen by nephrology yesterday. Recommends obtaining renal panel and continuing IVF. Patient was seen by ID yesterday. Recommends continuing IV vancomycin for urosepsis. Patient was seen by pulmonology today. Recommends PRBC transfusion. Labs indicate WBC 16.9, Hgb 6.3, Na 148, Cl 120, BUN 72, Cr 1.71, Ca 8, CRP 28.4, pro-carrie 6.93 and .       04/06   Patient is seen at bedside. Patient received 2 units of PRBC. Patient is on Zosyn. No new changes today. Patient seen by the vascular surgeon he recommend great toe amputation  And for sacral wound recommend Medihoney ointment     Labs show increasing Hgb of 9, decreasing WBC 14.7, Na 147, , BUN 55,   Cr 1.47, and CRP 21.4. CXR is unremarkable. 04/07  Patient is seen resting at bedside.  Patient received one unit of PRBC with increased Hgb of 10.6. No acute changes today. Patient is seen by nephrology who recommends free water flushes to correct hypernatremia. Labs show increasing Hgb of 10.6, WBC 15.1, pro-calcitonin 3.78, and CRP 16.1.    4/8    Patient had a rapid response this morning ABG and chest x-ray was ordered  Patient tachycardic tachypneic  ABG  pH 7.51 PCO2 31 PO2 65 bicarb 26 oxygen saturation 95% on room air    Chest x-ray shows no much changes    4/9    Patient resting the bed open eyes  On room air/breathing through the mouth    4/10    Patient open eyes not in distress breathing through her mouth  According to the nurse patient not sleep all day and all night yesterday      4/11  Patient is seen at bedside. She is awake but not in any distress. Patient is on zosyn. PEG tube in place. Urine culture positive for Pseudomonas. CXR shows no focal changes. Labs remarkable for WBC  22.3, Hgb 10.5, Na 153, , Cr 1.55 BUN 94, pro-calcitonin 1.64, and CRP 3.33.    4/12  Patient is on Vancomycin, zosyn and D5W. PEG tube in place. Spoke to daughter at bedside. She has no new complaints. Labs remarkable for:  WBC  18.9,  Cr 1.33, BUN 84,  and CRP 2.33 which are decreasing. Hgb 11.2, Na-corrected 158, , and pro-calcitonin 1.93 which are increasing. Seen by pulmonology. No new recommendations. Seen by GI. No new recommendations. Seen by nephrology. recommends IV D5W IVF to decrease hypernatremia. Seen by ID. Recommends discontinuing fluconazole, follow up blood cultures, and continuing IV zosyn and vancomycin. 4/13/2022  Patient is on Vancomycin, zosyn and D5W. PEG tube in place. Nephrology - recommends IV D5W IVF to decrease hypernatremia. Increase glargine to 25 units twice daily to correct hyperglycemia.  Once the D5W is discontinued, the dose of glargine will have to be decreased    ID - Continue IV Zosyn and Vancomycin; discontinue Vancomycin if WBC continues to decrease    Pulmonology - No new recommendations. GI - No new recommendations. CXR 4/11/2022 - No acute pulmonary process (no focal changes)     Labs remarkable for:  Blood Glucose 380  WBC  19.8  Hgb 10.3   Na-corrected 154  BUN 69  Cr 1.22  CRP 3.14  pro-calcitonin 2.28     4/14  Patient's family members are present at bedside with the patient. They notes that the patient was in distress this morning. Patient still on vancomycin and zosyn. PEG tube in place and patient is receiving enteral feedings. Seen by vascular surgeon who recommends great toe amputation. Labs remarkable for Na-corrected 154 improving, Cr 1.19 improving, pro-carrie 2.42,  and CRP 7.65.    4/15  Patient is seen grunting and restless at bedside. Daughter is at bedside. She has no new complaints. UA on 4/14 still positive for WBC>100, leukocyte esterase and grew yeast.  Labs remarkable for WBC 15.9 decreasing, Hb 9.5, Na-corrected 154, and pro-calcitonin 2.01. Patient seen by ID. Recommends continuing zosyn and discontinuing vancomycin. Review of Systems   Unable to perform ROS: Acuity of condition     Unable to obtain. Objective     Visit Vitals  BP (!) 154/61 (BP 1 Location: Left upper arm, BP Patient Position: Lying; At rest)   Pulse 76   Temp 98.8 °F (37.1 °C)   Resp 18   Ht 5' 8\" (1.727 m)   Wt 86.6 kg (191 lb)   SpO2 96%   BMI 29.04 kg/m²    O2 Flow Rate (L/min): 0 l/min O2 Device: None (Room air)    Physical Exam:   Physical Exam  Constitutional: Open eyes do not follow any commands. Tracks with eyes. HENT:   Head: Normocephalic and atraumatic. Eyes: Pupils are equal, round, and reactive to light. EOM are normal.   Cardiovascular: Normal rate, regular rhythm and normal heart sounds. Pulmonary/Chest: Decreased breath sound   abdominal: Soft. Bowel sounds are normal. There is no abdominal tenderness. There is no rebound and no guarding.    Musculoskeletal: Normal range of motion. Neurological: Open eyes do not follow any, right-sided weakness  PEG tube in place. Intake & Output:  Current Shift: No intake/output data recorded. Last three shifts: 04/13 1901 - 04/15 0700  In: 1659   Out: 4950 [Urine:4250; Drains:700]    Lab/Data Review:  Lab results reviewed. For significant abnormal values and values requiring intervention, see assessment and plan.        24 Hour Results:    Recent Results (from the past 24 hour(s))   URINALYSIS W/ REFLEX CULTURE    Collection Time: 04/14/22  2:01 PM    Specimen: Urine   Result Value Ref Range    Color Lisa      Appearance Turbid (A) Clear      Specific gravity 1.013 1.003 - 1.030      pH (UA) 6.0 5.0 - 8.0      Protein 100 (A) Negative mg/dL    Glucose >300 (A) Negative mg/dL    Ketone Negative Negative mg/dL    Bilirubin Negative Negative      Blood Moderate (A) Negative      Urobilinogen 0.1 0.1 - 1.0 EU/dL    Nitrites Negative Negative      Leukocyte Esterase Large (A) Negative      UA:UC IF INDICATED Urine Culture Ordered (A) Culture not indicated by UA result      WBC >100 (H) 0 - 4 /hpf    RBC >100 (H) 0 - 5 /hpf    Bacteria Negative Negative /hpf    Mucus Trace /lpf    PRESUMPTIVE YEAST Occasional      Yeast HYPHENATED YEAST OCCASIONAL Negative    Budding yeast Present (A) Negative     GLUCOSE, POC    Collection Time: 04/14/22  6:40 PM   Result Value Ref Range    Glucose (POC) 324 (H) 65 - 117 mg/dL    Performed by CHETAN/Eulogio Brady Final, POC    Collection Time: 04/14/22 11:44 PM   Result Value Ref Range    Glucose (POC) 300 (H) 65 - 117 mg/dL    Performed by Kain CURRIE    GLUCOSE, POC    Collection Time: 04/15/22  5:56 AM   Result Value Ref Range    Glucose (POC) 301 (H) 65 - 117 mg/dL    Performed by LUIZ CURRIE    CBC WITH AUTOMATED DIFF    Collection Time: 04/15/22  7:28 AM   Result Value Ref Range    WBC 15.9 (H) 3.6 - 11.0 K/uL    RBC 3.39 (L) 3.80 - 5.20 M/uL    HGB 9.5 (L) 11.5 - 16.0 g/dL    HCT 31.6 (L) 35.0 - 47.0 %    MCV 93.2 80.0 - 99.0 FL    MCH 28.0 26.0 - 34.0 PG    MCHC 30.1 30.0 - 36.5 g/dL    RDW 16.5 (H) 11.5 - 14.5 %    PLATELET 228 489 - 025 K/uL    MPV 12.2 8.9 - 12.9 FL    NRBC 0.0 0.0  WBC    ABSOLUTE NRBC 0.00 0.00 - 0.01 K/uL    NEUTROPHILS 82 (H) 32 - 75 %    LYMPHOCYTES 12 12 - 49 %    MONOCYTES 3 (L) 5 - 13 %    EOSINOPHILS 2 0 - 7 %    BASOPHILS 0 0 - 1 %    IMMATURE GRANULOCYTES 1 (H) 0 - 0.5 %    ABS. NEUTROPHILS 13.2 (H) 1.8 - 8.0 K/UL    ABS. LYMPHOCYTES 1.9 0.8 - 3.5 K/UL    ABS. MONOCYTES 0.5 0.0 - 1.0 K/UL    ABS. EOSINOPHILS 0.3 0.0 - 0.4 K/UL    ABS. BASOPHILS 0.0 0.0 - 0.1 K/UL    ABS. IMM. GRANS. 0.1 (H) 0.00 - 0.04 K/UL    DF AUTOMATED     C REACTIVE PROTEIN, QT    Collection Time: 04/15/22  7:28 AM   Result Value Ref Range    C-Reactive protein 6.55 (H) 0.00 - 0.60 mg/dL   PROCALCITONIN    Collection Time: 04/15/22  7:28 AM   Result Value Ref Range    Procalcitonin 2.01 (H) 0 ng/mL   GLUCOSE, POC    Collection Time: 04/15/22  8:33 AM   Result Value Ref Range    Glucose (POC) 322 (H) 65 - 117 mg/dL    Performed by Jordan Villareal          Imaging:    XR CHEST PORT   Final Result   No acute pulmonary process. XR CHEST PORT   Final Result   No significant change. XR CHEST PORT   Final Result      XR CHEST PORT   Final Result   No acute cardiopulmonary abnormality. .       XR CHEST PORT   Final Result      CT HEAD WO CONT   Final Result   Chronic changes which appear stable. No acute or active intracranial process. Chronic paranasal sinus disease         XR CHEST PORT   Final Result   No acute findings.              Assessment   Severe sepsis with UTI  UTI/Pseudomonas  Acute on chronic kidney disease  Hyperkalemia  Hypernatremia  CVA with right-sided weakness  Anemia of chronic disease  Gangrene of the left great toe  Sacral decubitus ulcer  Acute respiratory failure  Possible aspiration  Elevated BNP  PEG tube malfunction/seen by the GI now working    Plan     Amlodipine 5 mg daily  Aspirin 81 mg daily  Lipitor 20 daily  Ferrous sulfate 300 twice a day  Gabapentin 300 mg once in the morning and at bedtime  Heparin 5000 units subcu every 8 hours  Hydralazine 50 mg 3 times a day  Lantus 22 units subcu twice a day  Metoprolol 50 mg twice a day  IV Zosyn 3.375 IV every 8 hours  Discontinue Vancomycin  Flucanazole 100 mg daily      Monitor renal function and sodium level.       Pulmonary nephrology infectious disease and vascular surgical.  Schedule for amputation of the left great toe    Start PEG tube feeding with water flush   Start back on gabapentin at night only    Blood sugars running high secondary D5 at 75 cc/h as per nephrology  When sodium level back to normal patient can be discharged    Discussed with the patient's daughter at the bedside      Current Facility-Administered Medications:     gabapentin (NEURONTIN) capsule 300 mg, 300 mg, Oral, BID, Israel Soto MD, 300 mg at 04/14/22 2158    insulin glargine (LANTUS) injection 32 Units, 32 Units, SubCUTAneous, BID, Israel Soto MD, 32 Units at 04/15/22 0834    artificial saliva (MOUTH KOTE) 1 Spray, 1 Spray, Oral, PRN, Israel Soto MD, 1 Spray at 04/15/22 1053    amLODIPine (NORVASC) tablet 5 mg, 5 mg, Oral, DAILY, Israel Soto MD, 5 mg at 04/14/22 0848    metoprolol tartrate (LOPRESSOR) tablet 50 mg, 50 mg, Oral, BID, Israel Soto MD, 50 mg at 04/14/22 2158    nitroglycerin (NITROBID) 2 % ointment 1 Inch, 1 Inch, Topical, Q6H, Thomas Boogie MD, 1 Inch at 04/15/22 0605    hydrALAZINE (APRESOLINE) tablet 50 mg, 50 mg, Oral, TID, Israel Soto MD, 50 mg at 04/14/22 2158    dextrose 5% infusion, 100 mL/hr, IntraVENous, CONTINUOUS, Kalpesh Lara MD, Last Rate: 100 mL/hr at 04/15/22 1035, 100 mL/hr at 04/15/22 1035    [Held by provider] furosemide (LASIX) injection 40 mg, 40 mg, IntraVENous, DAILY, Israel Soto MD, 40 mg at 04/09/22 1054    VANCOMYCIN INFORMATION NOTE, , Other, Rx Dosing/Monitoring, Shy Velasco MD    labetaloL (NORMODYNE;TRANDATE) 20 mg/4 mL (5 mg/mL) injection 20 mg, 20 mg, IntraVENous, Q4H PRN, Israel Soto MD, 20 mg at 04/15/22 0255    hydrALAZINE (APRESOLINE) 20 mg/mL injection 20 mg, 20 mg, IntraVENous, Q6H PRN, Israel Stoo MD, 20 mg at 04/12/22 0257    0.9% sodium chloride infusion 250 mL, 250 mL, IntraVENous, PRN, Martínez Crespo MD    insulin lispro (HUMALOG) injection, , SubCUTAneous, Q6H, Israel Soto MD, 10 Units at 04/15/22 0605    atorvastatin (LIPITOR) tablet 20 mg, 20 mg, Oral, QHS, Israel Soto MD, 20 mg at 04/14/22 2158    latanoprost (XALATAN) 0.005 % ophthalmic solution 1 Drop, 1 Drop, Right Eye, QPM, Israel Soto MD, 1 Drop at 04/14/22 1905    aspirin delayed-release tablet 81 mg, 81 mg, Oral, DAILY, Israel Soto MD, 81 mg at 04/14/22 0847    brimonidine (ALPHAGAN) 0.2 % ophthalmic solution 1 Drop, 1 Drop, Both Eyes, TID, Israel Soto MD, 1 Drop at 04/15/22 0836    glucose chewable tablet 16 g, 4 Tablet, Oral, PRN, Jonah Soto MD    glucagon (GLUCAGEN) injection 1 mg, 1 mg, IntraMUSCular, PRN, Abril Sherwood MD  [place]  [DISCONTINUED] acetaminophen (TYLENOL) tablet 650 mg, 650 mg, Oral, Q6H PRN, 650 mg at 04/04/22 2246 **OR** acetaminophen (TYLENOL) suppository 650 mg, 650 mg, Rectal, Q6H PRN, Jonah Soto MD    polyethylene glycol (MIRALAX) packet 17 g, 17 g, Oral, DAILY PRN, Jonah Soto MD    ondansetron (ZOFRAN ODT) tablet 4 mg, 4 mg, Oral, Q8H PRN **OR** ondansetron (ZOFRAN) injection 4 mg, 4 mg, IntraVENous, Q6H PRN, Israel Soto MD    heparin (porcine) injection 5,000 Units, 5,000 Units, SubCUTAneous, Q8H, Israel Soto MD, 5,000 Units at 04/15/22 0834    piperacillin-tazobactam (ZOSYN) 3.375 g in 0.9% sodium chloride (MBP/ADV) 100 mL MBP, 3.375 g, IntraVENous, Q8H, Israel Soto MD, Last Rate: 25 mL/hr at 04/15/22 1035, 3.375 g at 04/15/22 1035   albuterol-ipratropium (DUO-NEB) 2.5 MG-0.5 MG/3 ML, 3 mL, Nebulization, Q6H PRN, Zoya Soto MD    ferrous sulfate 300 mg (60 mg iron)/5 mL oral syrup 300 mg, 300 mg, Oral, BID, Israel Soto MD, 300 mg at 04/14/22 2158    acetaminophen (TYLENOL) solution 650 mg, 650 mg, Per NG tube, Q6H PRN, Israel Soto MD, 650 mg at 04/14/22 2158    Current Outpatient Medications   Medication Instructions    acidophilus-pectin, citrus 25 million cell -100 mg tab tablet 2 Tablets, Oral, DAILY    amLODIPine (NORVASC) 5 mg, Oral, DAILY    artificial saliva (MOUTH KOTE) spra 1 Spray, Oral, 3 TIMES DAILY    aspirin delayed-release 81 mg tablet Oral, DAILY    atorvastatin (LIPITOR) 20 mg tablet TAKE 1 TABLET BY MOUTH EVERY NIGHT    brimonidine (ALPHAGAN) 0.2 % ophthalmic solution 1 Drop, Both Eyes, 3 TIMES DAILY    ferrous sulfate 325 mg, Oral, 2 TIMES DAILY    gabapentin (NEURONTIN) 300 mg, Oral, 2 TIMES DAILY    insulin glargine (LANTUS) 50 Units, SubCUTAneous, DAILY    latanoprost (XALATAN) 0.005 % ophthalmic solution 1 Drop, Right Eye, EVERY EVENING    lidocaine (XYLOCAINE) 4 % (40 mg/mL) topical solution 1 mL, Topical, AS NEEDED    metoprolol succinate (TOPROL-XL) 50 mg, Oral, 2 TIMES DAILY         Signed By: Pankaj Martinez MD     April 15, 2022

## 2022-04-15 NOTE — PROGRESS NOTES
PT/OT consult received, Patient is total care at [facility] at baseline. Unable to participate with therapy at this point. We will DC PT. Cristinae reorder therapy if anything changes. Thank you.

## 2022-04-15 NOTE — PROGRESS NOTES
Progress Note    Patient: Jose Escobedo MRN: [id number]  SSN: [id number]    YOB: 1945  Age: 78 y.o. Sex: female      Admit Date: 4/3/2022    LOS: 11 days     Subjective:   GI in consultation for peg tube malfunction    4/15: Received call from RN that patient's peg tube was clogged. When peg tube evaluated the peg tube was infusing Glucerna at 60 ml/hr without difficulty. Family at bedside reports some one flushed the tube and it is now working fine. Peg tube site without redness or drainage. History of Present Illness: Ligia Benz is a 78 y. o. female who is seen in consultation for peg tube malfunction. The patient has a past medical history significant for CVA with peg tube placement, right side weakness, Left great toe gangrene,hypertension, neuropathy CKD stage 3b, type 2 Diabetes, bedridden. Ms Monserrat Godoy was found with altered mental status per SNF staff. She does have sacral wounds and severe peripheral vascular disease. She is followed by Vascular surgery. She is a poor historian and majority of medical history. obtained form medical records and nursing staff.  CT of the head on arrival shows chronic changes with no acute intracranial process and chronic paranasal sinus disease. Urinalysis shows pyuria and bacteria. She is on IV zosyn and IV Vancomycin. She is followed by infectious disease. Ms. Monserrat Godoy is alert but non-verbal. Spoke with RN who states the night nurse reported the peg tube was occluded but current RN reports she was able to flush the peg tube without difficulty. She has glucerna infusing at 60 ml/hr without difficulty.  Abdomen is soft with normoactive bowel sounds.          Objective:     Vitals:    04/14/22 2011 04/14/22 2345 04/15/22 0252 04/15/22 0826   BP: (!) 154/55 (!) 152/64 (!) 152/69 (!) 154/61   Pulse: 76 74 74 76   Resp: 18 18 18 18   Temp: 99 °F (37.2 °C) 98.5 °F (36.9 °C) 98 °F (36.7 °C) 98.8 °F (37.1 °C)   SpO2: 98% 100% 97% 96%   Weight: Height:            Intake and Output:  Current Shift: No intake/output data recorded. Last three shifts: 04/13 1901 - 04/15 0700  In: 1659   Out: 4950 [Urine:4250; Drains:700]    Physical Exam:     Skin:  Extremities and face reveal no rashes. No herrera erythema. No telangiectasias on the chest wall. HEENT: Sclerae anicteric. Extra-occular muscles are intact. No oral ulcers. Cardiovascular: Regular rate and rhythm. Respiratory:  Comfortable breathing with no accessory muscle use. GI:  Abdomen nondistended, soft, and nontender.  Normal active bowel sounds. Peg tube site w/o redness or drainage. Rectal:  Deferred  Musculoskeletal: Generalized weakness  Neurological:  Alert, non verbal  Psychiatric:  Not anxious  Lymphatic:  No cervical or supraclavicular adenopathy.       Lab/Data Review:  Recent Results (from the past 24 hour(s))   GLUCOSE, POC    Collection Time: 04/14/22 12:04 PM   Result Value Ref Range    Glucose (POC) 366 (H) 65 - 117 mg/dL    Performed by [address] [place] [address]    Collection Time: 04/14/22  2:01 PM    Specimen: Urine   Result Value Ref Range    Color Lisa      Appearance Turbid (A) Clear      Specific gravity 1.013 1.003 - 1.030      pH (UA) 6.0 5.0 - 8.0      Protein 100 (A) Negative mg/dL    Glucose >300 (A) Negative mg/dL    Ketone Negative Negative mg/dL    Bilirubin Negative Negative      Blood Moderate (A) Negative      Urobilinogen 0.1 0.1 - 1.0 EU/dL    Nitrites Negative Negative      Leukocyte Esterase Large (A) Negative      UA:UC IF INDICATED Urine Culture Ordered (A) Culture not indicated by UA result      WBC >100 (H) 0 - 4 /hpf    RBC >100 (H) 0 - 5 /hpf    Bacteria Negative Negative /hpf    Mucus Trace /lpf    PRESUMPTIVE YEAST Occasional      Yeast HYPHENATED YEAST OCCASIONAL Negative    Budding yeast Present (A) Negative     GLUCOSE, POC    Collection Time: 04/14/22  6:40 PM   Result Value Ref Range    Glucose (POC) 324 (H) 65 - 117 mg/dL Performed by Cezar Zacarias    GLUCOSE, POC    Collection Time: 04/14/22 11:44 PM   Result Value Ref Range    Glucose (POC) 300 (H) 65 - 117 mg/dL    Performed by Albert Perla, POC    Collection Time: 04/15/22  5:56 AM   Result Value Ref Range    Glucose (POC) 301 (H) 65 - 117 mg/dL    Performed by Carlene Yancey    CBC WITH AUTOMATED DIFF    Collection Time: 04/15/22  7:28 AM   Result Value Ref Range    WBC 15.9 (H) 3.6 - 11.0 K/uL    RBC 3.39 (L) 3.80 - 5.20 M/uL    HGB 9.5 (L) 11.5 - 16.0 g/dL    HCT 31.6 (L) 35.0 - 47.0 %    MCV 93.2 80.0 - 99.0 FL    MCH 28.0 26.0 - 34.0 PG    MCHC 30.1 30.0 - 36.5 g/dL    RDW 16.5 (H) 11.5 - 14.5 %    PLATELET 785 811 - 419 K/uL    MPV 12.2 8.9 - 12.9 FL    NRBC 0.0 0.0  WBC    ABSOLUTE NRBC 0.00 0.00 - 0.01 K/uL    NEUTROPHILS 82 (H) 32 - 75 %    LYMPHOCYTES 12 12 - 49 %    MONOCYTES 3 (L) 5 - 13 %    EOSINOPHILS 2 0 - 7 %    BASOPHILS 0 0 - 1 %    IMMATURE GRANULOCYTES 1 (H) 0 - 0.5 %    ABS. NEUTROPHILS 13.2 (H) 1.8 - 8.0 K/UL    ABS. LYMPHOCYTES 1.9 0.8 - 3.5 K/UL    ABS. MONOCYTES 0.5 0.0 - 1.0 K/UL    ABS. EOSINOPHILS 0.3 0.0 - 0.4 K/UL    ABS. BASOPHILS 0.0 0.0 - 0.1 K/UL    ABS. IMM. GRANS. 0.1 (H) 0.00 - 0.04 K/UL    DF AUTOMATED     C REACTIVE PROTEIN, QT    Collection Time: 04/15/22  7:28 AM   Result Value Ref Range    C-Reactive protein 6.55 (H) 0.00 - 0.60 mg/dL   PROCALCITONIN    Collection Time: 04/15/22  7:28 AM   Result Value Ref Range    Procalcitonin 2.01 (H) 0 ng/mL   GLUCOSE, POC    Collection Time: 04/15/22  8:33 AM   Result Value Ref Range    Glucose (POC) 322 (H) 65 - 117 mg/dL    Performed by Edilma Rock               XR CHEST PORT   Final Result   No acute pulmonary process. XR CHEST PORT   Final Result   No significant change. XR CHEST PORT   Final Result      XR CHEST PORT   Final Result   No acute cardiopulmonary abnormality. .       XR CHEST PORT   Final Result      CT HEAD WO CONT   Final Result   Chronic changes which appear stable. No acute or active intracranial process. Chronic paranasal sinus disease         XR CHEST PORT   Final Result   No acute findings. Assessment:     Active Problems:    Hyperkalemia (4/4/2022)      UTI (urinary tract infection) (4/4/2022)      Sepsis ([facility] [address] (4/4/2022)      RICARDO (acute kidney injury) ([facility] [address] (4/4/2022)      AMS (altered mental status) (4/4/2022)        Plan:   1. Peg tube Malfunction (Resolved)      Currently peg tube is infusing Glucerna at 60 ml/hr      Family member reports it was flushed and now working      Peg tube site w/o redness or drainage.   2. CKD      Nephrology input appreciated  3. Hypertension     Continue amolipine 5 mg daily      Continue hydralazine 50 mg TID      Metoprolol 50 mg BID  4. Type 2 Diabetes      Continue home hypoglycemic medications      Continue bedside glucose monitoring    Peg tube infusing w/o difficulty. GI will follow peripherally. Thank you for allowing me to participate in this patients care. Plan discussed with Dr. Jordyn Callahan and he approves.     Signed By: Tone Meneses NP     April 15, 2022

## 2022-04-15 NOTE — PROGRESS NOTES
GI NP Stevie Sanches called and made aware of Patient clogged Peg tube. NP Rosalia Bean stated she will come on Floor and look at patient Peg tube.

## 2022-04-15 NOTE — PROGRESS NOTES
Progress Note    Patient: Nivia Dalal MRN: [id number]  SSN: [id number]    YOB: 1945  Age: 78 y.o. Sex: female      Admit Date: 4/3/2022    LOS: 11 days     Subjective:   Patient followed for sepsis with UTI secondary to Pseudomonas sensitive to Zosyn. Repeat urinalysis showing marked pyuria and budding yeasts. She remains on Zosyn, but Fluconazole has been added for yeasts. Blood cultures negative so far. Patient lying in bed, appears to be resting comfortably, remains nonverbal.        Objective:     Vitals:    04/14/22 2011 04/14/22 2345 04/15/22 0252 04/15/22 0826   BP: (!) 154/55 (!) 152/64 (!) 152/69 (!) 154/61   Pulse: 76 74 74 76   Resp: 18 18 18 18   Temp: 99 °F (37.2 °C) 98.5 °F (36.9 °C) 98 °F (36.7 °C) 98.8 °F (37.1 °C)   SpO2: 98% 100% 97% 96%   Weight:       Height:            Intake and Output:  Current Shift: No intake/output data recorded. Last three shifts: 04/13 1901 - 04/15 0700  In: 1659   Out: 4950 [Urine:4250; Drains:700]    Physical Exam:   Vitals and nursing note reviewed. Constitutional:       General: She is not in acute distress. Appearance: She is ill-appearing. HENT: eyes closed, Room Air   Cardiovascular:      Rate and Rhythm: Normal rate and regular rhythm. Heart sounds: No murmur heard. Pulmonary:      Effort: Pulmonary effort is normal.      Breath sounds: Normal breath sounds. Abdominal:      General: Bowel sounds are normal.      Palpations: Abdomen is soft. Tenderness: There is no abdominal tenderness. Comments: PEG tube in place, site unremarkable, tube feeding in progress  Genitourinary:     Comments: Bell catheter with moderate urine sediment  Flexiseal with dark brown watery stool  Musculoskeletal:      Right lower leg: No edema. Left lower leg: No edema. Comments: Left great toe with dry gangrene, dark and shrunken   Skin: Stage 2 sacral wound     Findings: No rash.    Neurological:      Mental Status: She is alert. Comments: Unable to assess   Psychiatric:      Comments: Unable to assess      Lab/Data Review:     WBC 15,900  UA with WBC >100, budding yeasts    Procal 2.01 <2.42 < 2.28 <1.93 <1.64 <3.37 <3.78 <4.78 <6.93  CRP 6.55 < 7.65 <3.14 <2.33 <3.44 <11.50 < 14.50 <16.10 <21.40 <28.40    Blood cultures (4/3) No growth FINAL  Blood cultures (4/10) 1 of  4 bottles flagged positive, No organisms seen  Urine culture (4/7) >100,000 cfu/ml Pseudomonas aeruginosa       CXR (4/11) No acute pulmonary process    Assessment:     Active Problems:    Hyperkalemia (4/4/2022)      UTI (urinary tract infection) (4/4/2022)      Sepsis ([facility] [address] (4/4/2022)      RICARDO (acute kidney injury) ([facility] [address] (4/4/2022)      AMS (altered mental status) (4/4/2022)    1. Sepsis with fever and leukocytosis, elevated procal and CRP, resolving, on empiric IV Vancomycin  2. UTI with marked pyuria and bacteriuria, secondary to Pseudomonas aeruginosa, Day #12 IV Zosyn  3. Altered mentals status, multifactorial including sepsis  4. Uncontrolled diabetes mellitus with hyperglycemia  5. Chronic maxillary sinusitis  6. ASCVD with prior stroke  7. Hepatitis C antibody positive, HCV RNA negative  8. 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 Sacral wound, Stage 2, no evidence of gross infection  10. Acute hypoxic respiratory failure, resolved  11. Diarrhea, presumed secondary to tube feeding  12. Candida UTI with marked pyuria and yeasts    Comment:  WBC, procal and CRP increased further today. Concern now for possible Candida superinfection (now off Fluconazole). Plan:   1. Continue IV Zosyn for 2 more days  2. Continue Fluconazole but would give 400 mg today, then 200 mg daily  3. Follow-up blood cultures and urine culture  4.  In am, repeat CBC, procal and CRP        Signed By: Arnold Otero MD     April 15, 2022

## 2022-04-15 NOTE — PROGRESS NOTES
SHIRLEY communicated with [place] via [facility] to ask to [facility]. To prepare for patient d/c early next week. Updated clinicals sent.

## 2022-04-15 NOTE — PROGRESS NOTES
Renal Daily Progress Note    Admit Date: 4/3/2022      Subjective:   Her eyes are open. No change clinically. Output documented at 2100 mL. Intake is incorrect.     Current Facility-Administered Medications   Medication Dose Route Frequency    fluconazole (DIFLUCAN) 400mg/200 mL IVPB (premix)  400 mg IntraVENous ONCE    [START ON 4/16/2022] fluconazole (DIFLUCAN) 200mg/100 mL IVPB (premix)  200 mg IntraVENous Q24H    gabapentin (NEURONTIN) capsule 300 mg  300 mg Oral BID    insulin glargine (LANTUS) injection 32 Units  32 Units SubCUTAneous BID    artificial saliva (MOUTH KOTE) 1 Spray  1 Spray Oral PRN    amLODIPine (NORVASC) tablet 5 mg  5 mg Oral DAILY    metoprolol tartrate (LOPRESSOR) tablet 50 mg  50 mg Oral BID    nitroglycerin (NITROBID) 2 % ointment 1 Inch  1 Inch Topical Q6H    hydrALAZINE (APRESOLINE) tablet 50 mg  50 mg Oral TID    dextrose 5% infusion  100 mL/hr IntraVENous CONTINUOUS    [Held by provider] furosemide (LASIX) injection 40 mg  40 mg IntraVENous DAILY    VANCOMYCIN INFORMATION NOTE   Other Rx Dosing/Monitoring    labetaloL (NORMODYNE;TRANDATE) 20 mg/4 mL (5 mg/mL) injection 20 mg  20 mg IntraVENous Q4H PRN    hydrALAZINE (APRESOLINE) 20 mg/mL injection 20 mg  20 mg IntraVENous Q6H PRN    0.9% sodium chloride infusion 250 mL  250 mL IntraVENous PRN    insulin lispro (HUMALOG) injection   SubCUTAneous Q6H    atorvastatin (LIPITOR) tablet 20 mg  20 mg Oral QHS    latanoprost (XALATAN) 0.005 % ophthalmic solution 1 Drop  1 Drop Right Eye QPM    aspirin delayed-release tablet 81 mg  81 mg Oral DAILY    brimonidine (ALPHAGAN) 0.2 % ophthalmic solution 1 Drop  1 Drop Both Eyes TID    glucose chewable tablet 16 g  4 Tablet Oral PRN    glucagon (GLUCAGEN) injection 1 mg  1 mg IntraMUSCular PRN    acetaminophen (TYLENOL) suppository 650 mg  650 mg Rectal Q6H PRN    polyethylene glycol (MIRALAX) packet 17 g  17 g Oral DAILY PRN    ondansetron (ZOFRAN ODT) tablet 4 mg  4 mg Oral Q8H PRN    Or    ondansetron (ZOFRAN) injection 4 mg  4 mg IntraVENous Q6H PRN    heparin (porcine) injection 5,000 Units  5,000 Units SubCUTAneous Q8H    piperacillin-tazobactam (ZOSYN) 3.375 g in 0.9% sodium chloride (MBP/ADV) 100 mL MBP  3.375 g IntraVENous Q8H    albuterol-ipratropium (DUO-NEB) 2.5 MG-0.5 MG/3 ML  3 mL Nebulization Q6H PRN    ferrous sulfate 300 mg (60 mg iron)/5 mL oral syrup 300 mg  300 mg Oral BID    acetaminophen (TYLENOL) solution 650 mg  650 mg Per NG tube Q6H PRN        Review of Systems    ROS   Obtainable  Objective:     Patient Vitals for the past 8 hrs:   BP Temp Pulse Resp SpO2   04/15/22 1253 (!) 145/63 -- 73 -- --   04/15/22 0826 (!) 154/61 98.8 °F (37.1 °C) 76 18 96 %     No intake/output data recorded. 04/13 1901 - 04/15 0700  In: 1659   Out: 6758 [Urine:4250; Drains:700]    Physical Exam:   Physical Exam  Cardiovascular:      Rate and Rhythm: Regular rhythm. Pulmonary:      Breath sounds: Normal breath sounds. Abdominal:      General: Bowel sounds are normal.      Palpations: Abdomen is soft. Comments: On PEG feeding   Skin:     General: Skin is warm. Absent toes on the right foot        XR CHEST PORT   Final Result   No acute pulmonary process. XR CHEST PORT   Final Result   No significant change. XR CHEST PORT   Final Result      XR CHEST PORT   Final Result   No acute cardiopulmonary abnormality. .       XR CHEST PORT   Final Result      CT HEAD WO CONT   Final Result   Chronic changes which appear stable. No acute or active intracranial process. Chronic paranasal sinus disease         XR CHEST PORT   Final Result   No acute findings.            Data Review   Recent Labs     04/15/22  0728 04/14/22  1145 04/13/22  0401   WBC 15.9* 18.4* 19.8*   HGB 9.5* 9.8* 10.3*   HCT 31.6* 32.5* 34.6*    263 327     Recent Labs     04/15/22  1104 04/14/22  0738 04/13/22  0401   * 152* 154*   K 5.1 5.0 4.8   * 122* 125*   CO2 27 27 27   * 342* 359*   BUN 47* 56* 69*   CREA 0.96 1.19* 1.22*   CA 7.9* 8.3* 8.3*   ALB  --  1.6* 1.6*   ALT  --  41 38     No components found for: GLPOC  No results for input(s): PH, PCO2, PO2, HCO3, FIO2 in the last 72 hours. No results for input(s): INR, INREXT, INREXT, INREXT in the last 72 hours. Assessment:           Active Problems:    Hyperkalemia (4/4/2022)      UTI (urinary tract infection) (4/4/2022)      Sepsis ([facility] [address] (4/4/2022)      RICARDO (acute kidney injury) ([facility] [address] (4/4/2022)      AMS (altered mental status) (4/4/2022)    Acute kidney injury on CKD. Creatinine down to 0.96 mg today. Hypernatremia with a free water deficit of 2.5 L. Sodium is better at 147 mEq. Diarrhea and obligatory fluid loss due to hyperglycemia contributing to the hyponatremia. Leukocytosis -improving. CVA    Pseudomonas aeruginosa urinary tract infection    Diabetes mellitus uncontrolled which is worsening the hypernatremia due to obligatory water loss. Plan:   Water flushes via the PEG at  225 mL of water every 4 hours. On IV D5W to 100 mL/h   Will decrease IV D5W to 80 mL/h. Watch for hypoglycemia with increased dose of glargine. Once the D5W is discontinued, the dose of glargine will have to be decreased.

## 2022-04-15 NOTE — PROGRESS NOTES
Patient Peg Tube was Unclogged by GI Nurse. Peg tube intact and patent with Glucerna Running as ordered.

## 2022-04-15 NOTE — PROGRESS NOTES
PROGRESS NOTE    Patient: Oliverio Pablo MRN: [id number]  SSN: [id number]    YOB: 1945  Age: 78 y.o. Sex: female      Admit Date: 4/3/2022    LOS: 11 days       Subjective     Chief Complaint   Patient presents with    Altered mental status       HPI: Patient is a 78y.o. 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 04/05   Patient is seen at bedside with daughter and nephew today. Patient is in distress due to pain and daughter notes that patient gets Neurontin TID for neuropathy daily. Otherwise, patient is still receiving enteric feeding through PEG tube and getting IVF. Patient was seen by nephrology yesterday. Recommends obtaining renal panel and continuing IVF. Patient was seen by ID yesterday. Recommends continuing IV vancomycin for urosepsis. Patient was seen by pulmonology today. Recommends PRBC transfusion. Labs indicate WBC 16.9, Hgb 6.3, Na 148, Cl 120, BUN 72, Cr 1.71, Ca 8, CRP 28.4, pro-carrie 6.93 and .       04/06   Patient is seen at bedside. Patient received 2 units of PRBC. Patient is on Zosyn. No new changes today. Patient seen by the vascular surgeon he recommend great toe amputation  And for sacral wound recommend Medihoney ointment     Labs show increasing Hgb of 9, decreasing WBC 14.7, Na 147, , BUN 55,   Cr 1.47, and CRP 21.4. CXR is unremarkable. 04/07  Patient is seen resting at bedside.  Patient received one unit of PRBC with increased Hgb of 10.6. No acute changes today. Patient is seen by nephrology who recommends free water flushes to correct hypernatremia. Labs show increasing Hgb of 10.6, WBC 15.1, pro-calcitonin 3.78, and CRP 16.1.    4/8    Patient had a rapid response this morning ABG and chest x-ray was ordered  Patient tachycardic tachypneic  ABG  pH 7.51 PCO2 31 PO2 65 bicarb 26 oxygen saturation 95% on room air    Chest x-ray shows no much changes    4/9    Patient resting the bed open eyes  On room air/breathing through the mouth    4/10    Patient open eyes not in distress breathing through her mouth  According to the nurse patient not sleep all day and all night yesterday      4/11  Patient is seen at bedside. She is awake but not in any distress. Patient is on zosyn. PEG tube in place. Urine culture positive for Pseudomonas. CXR shows no focal changes. Labs remarkable for WBC  22.3, Hgb 10.5, Na 153, , Cr 1.55 BUN 94, pro-calcitonin 1.64, and CRP 3.33.    4/12  Patient is on Vancomycin, zosyn and D5W. PEG tube in place. Spoke to daughter at bedside. She has no new complaints. Labs remarkable for:  WBC  18.9,  Cr 1.33, BUN 84,  and CRP 2.33 which are decreasing. Hgb 11.2, Na-corrected 158, , and pro-calcitonin 1.93 which are increasing. Seen by pulmonology. No new recommendations. Seen by GI. No new recommendations. Seen by nephrology. recommends IV D5W IVF to decrease hypernatremia. Seen by ID. Recommends discontinuing fluconazole, follow up blood cultures, and continuing IV zosyn and vancomycin. 4/13/2022  Patient is on Vancomycin, zosyn and D5W. PEG tube in place. Nephrology - recommends IV D5W IVF to decrease hypernatremia. Increase glargine to 25 units twice daily to correct hyperglycemia.  Once the D5W is discontinued, the dose of glargine will have to be decreased    ID - Continue IV Zosyn and Vancomycin; discontinue Vancomycin if WBC continues to decrease    Pulmonology - No new recommendations. GI - No new recommendations. CXR 4/11/2022 - No acute pulmonary process (no focal changes)     Labs remarkable for:  Blood Glucose 380  WBC  19.8  Hgb 10.3   Na-corrected 154  BUN 69  Cr 1.22  CRP 3.14  pro-calcitonin 2.28     4/14  Patient's family members are present at bedside with the patient. They notes that the patient was in distress this morning. Patient still on vancomycin and zosyn. PEG tube in place and patient is receiving enteral feedings. Seen by vascular surgeon who recommends great toe amputation. Labs remarkable for Na-corrected 154 improving, Cr 1.19 improving, pro-carrie 2.42,  and CRP 7.65.    4/15  Patient is seen grunting and restless at bedside. Daughter is at bedside. She has no new complaints. UA on 4/14 still positive for WBC>100, leukocyte esterase and grew yeast.  Labs remarkable for WBC 15.9 decreasing, Hb 9.5, Na-corrected 154, and pro-calcitonin 2.01. Patient seen by ID. Recommends continuing zosyn and discontinuing vancomycin. Review of Systems   Unable to perform ROS: Acuity of condition     Unable to obtain. Objective     Visit Vitals  BP (!) 154/61 (BP 1 Location: Left upper arm, BP Patient Position: Lying; At rest)   Pulse 76   Temp 98.8 °F (37.1 °C)   Resp 18   Ht 5' 8\" (1.727 m)   Wt 191 lb (86.6 kg)   SpO2 96%   BMI 29.04 kg/m²    O2 Flow Rate (L/min): 0 l/min O2 Device: None (Room air)    Physical Exam:   Physical Exam  Constitutional: Open eyes do not follow any commands. Tracks with eyes. HENT:   Head: Normocephalic and atraumatic. Eyes: Pupils are equal, round, and reactive to light. EOM are normal.   Cardiovascular: Normal rate, regular rhythm and normal heart sounds. Pulmonary/Chest: Decreased breath sound   abdominal: Soft. Bowel sounds are normal. There is no abdominal tenderness. There is no rebound and no guarding.    Musculoskeletal: Normal range of motion. Neurological: Open eyes do not follow any, right-sided weakness  PEG tube in place. Intake & Output:  Current Shift: No intake/output data recorded. Last three shifts: 04/13 1901 - 04/15 0700  In: 1659   Out: 4950 [Urine:4250; Drains:700]    Lab/Data Review:  Lab results reviewed. For significant abnormal values and values requiring intervention, see assessment and plan. 24 Hour Results:    Recent Results (from the past 24 hour(s))   CBC WITH AUTOMATED DIFF    Collection Time: 04/14/22 11:45 AM   Result Value Ref Range    WBC 18.4 (H) 3.6 - 11.0 K/uL    RBC 3.44 (L) 3.80 - 5.20 M/uL    HGB 9.8 (L) 11.5 - 16.0 g/dL    HCT 32.5 (L) 35.0 - 47.0 %    MCV 94.5 80.0 - 99.0 FL    MCH 28.5 26.0 - 34.0 PG    MCHC 30.2 30.0 - 36.5 g/dL    RDW 16.8 (H) 11.5 - 14.5 %    PLATELET 361 087 - 676 K/uL    MPV 12.0 8.9 - 12.9 FL    NRBC 0.0 0.0  WBC    ABSOLUTE NRBC 0.00 0.00 - 0.01 K/uL    NEUTROPHILS 84 (H) 32 - 75 %    LYMPHOCYTES 11 (L) 12 - 49 %    MONOCYTES 3 (L) 5 - 13 %    EOSINOPHILS 1 0 - 7 %    BASOPHILS 0 0 - 1 %    IMMATURE GRANULOCYTES 1 (H) 0 - 0.5 %    ABS. NEUTROPHILS 15.6 (H) 1.8 - 8.0 K/UL    ABS. LYMPHOCYTES 1.9 0.8 - 3.5 K/UL    ABS. MONOCYTES 0.5 0.0 - 1.0 K/UL    ABS. EOSINOPHILS 0.2 0.0 - 0.4 K/UL    ABS. BASOPHILS 0.0 0.0 - 0.1 K/UL    ABS. IMM.  GRANS. 0.1 (H) 0.00 - 0.04 K/UL    DF AUTOMATED     GLUCOSE, POC    Collection Time: 04/14/22 12:04 PM   Result Value Ref Range    Glucose (POC) 366 (H) 65 - 117 mg/dL    Performed by [address] [place] [address] W/ REFLEX CULTURE    Collection Time: 04/14/22  2:01 PM    Specimen: Urine   Result Value Ref Range    Color Lisa      Appearance Turbid (A) Clear      Specific gravity 1.013 1.003 - 1.030      pH (UA) 6.0 5.0 - 8.0      Protein 100 (A) Negative mg/dL    Glucose >300 (A) Negative mg/dL    Ketone Negative Negative mg/dL    Bilirubin Negative Negative      Blood Moderate (A) Negative      Urobilinogen 0.1 0.1 - 1.0 EU/dL    Nitrites Negative Negative      Leukocyte Esterase Large (A) Negative      UA:UC IF INDICATED Urine Culture Ordered (A) Culture not indicated by UA result      WBC >100 (H) 0 - 4 /hpf    RBC >100 (H) 0 - 5 /hpf    Bacteria Negative Negative /hpf    Mucus Trace /lpf    PRESUMPTIVE YEAST Occasional      Yeast HYPHENATED YEAST OCCASIONAL Negative    Budding yeast Present (A) Negative     GLUCOSE, POC    Collection Time: 04/14/22  6:40 PM   Result Value Ref Range    Glucose (POC) 324 (H) 65 - 117 mg/dL    Performed by Javi Amezquita, POC    Collection Time: 04/14/22 11:44 PM   Result Value Ref Range    Glucose (POC) 300 (H) 65 - 117 mg/dL    Performed by Vinicio Dang    GLUCOSE, POC    Collection Time: 04/15/22  5:56 AM   Result Value Ref Range    Glucose (POC) 301 (H) 65 - 117 mg/dL    Performed by LUIZ CURRIE    CBC WITH AUTOMATED DIFF    Collection Time: 04/15/22  7:28 AM   Result Value Ref Range    WBC 15.9 (H) 3.6 - 11.0 K/uL    RBC 3.39 (L) 3.80 - 5.20 M/uL    HGB 9.5 (L) 11.5 - 16.0 g/dL    HCT 31.6 (L) 35.0 - 47.0 %    MCV 93.2 80.0 - 99.0 FL    MCH 28.0 26.0 - 34.0 PG    MCHC 30.1 30.0 - 36.5 g/dL    RDW 16.5 (H) 11.5 - 14.5 %    PLATELET 586 190 - 045 K/uL    MPV 12.2 8.9 - 12.9 FL    NRBC 0.0 0.0  WBC    ABSOLUTE NRBC 0.00 0.00 - 0.01 K/uL    NEUTROPHILS 82 (H) 32 - 75 %    LYMPHOCYTES 12 12 - 49 %    MONOCYTES 3 (L) 5 - 13 %    EOSINOPHILS 2 0 - 7 %    BASOPHILS 0 0 - 1 %    IMMATURE GRANULOCYTES 1 (H) 0 - 0.5 %    ABS. NEUTROPHILS 13.2 (H) 1.8 - 8.0 K/UL    ABS. LYMPHOCYTES 1.9 0.8 - 3.5 K/UL    ABS. MONOCYTES 0.5 0.0 - 1.0 K/UL    ABS. EOSINOPHILS 0.3 0.0 - 0.4 K/UL    ABS. BASOPHILS 0.0 0.0 - 0.1 K/UL    ABS. IMM.  GRANS. 0.1 (H) 0.00 - 0.04 K/UL    DF AUTOMATED     C REACTIVE PROTEIN, QT    Collection Time: 04/15/22  7:28 AM   Result Value Ref Range    C-Reactive protein 6.55 (H) 0.00 - 0.60 mg/dL   GLUCOSE, POC    Collection Time: 04/15/22  8:33 AM   Result Value Ref Range    Glucose (POC) 322 (H) 65 - 117 mg/dL    Performed by [facility]          Imaging:    XR CHEST PORT   Final Result   No acute pulmonary process. XR CHEST PORT   Final Result   No significant change. XR CHEST PORT   Final Result      XR CHEST PORT   Final Result   No acute cardiopulmonary abnormality. .       XR CHEST PORT   Final Result      CT HEAD WO CONT   Final Result   Chronic changes which appear stable. No acute or active intracranial process. Chronic paranasal sinus disease         XR CHEST PORT   Final Result   No acute findings. Assessment   Severe sepsis with UTI  UTI/Pseudomonas  Acute on chronic kidney disease  Hyperkalemia  Hypernatremia  CVA with right-sided weakness  Anemia of chronic disease  Gangrene of the left great toe  Sacral decubitus ulcer  Acute respiratory failure  Possible aspiration  Elevated BNP    Plan     Amlodipine 5 mg daily  Aspirin 81 mg daily  Lipitor 20 daily  Ferrous sulfate 300 twice a day  Gabapentin 300 mg once in the morning and at bedtime  Heparin 5000 units subcu every 8 hours  Hydralazine 50 mg 3 times a day  Lantus 22 units subcu twice a day  Metoprolol 50 mg twice a day  IV Zosyn 3.375 IV every 8 hours  Discontinue Vancomycin      Monitor renal function and sodium level.       Pulmonary nephrology infectious disease and vascular surgical.  Schedule for amputation of the left great toe    Start PEG tube feeding with water flush   Start back on gabapentin at night only    Blood sugars running high secondary D5 at 75 cc/h as per nephrology  When sodium level back to normal patient can be discharged    Discussed with the patient's daughter at the bedside      Current Facility-Administered Medications:     Vancomycin - Random level to be drawn 4/15 @ 1100, , Other, ONCE, Norberto Lyle MD    gabapentin (NEURONTIN) capsule 300 mg, 300 mg, Oral, BID, Israel Soto MD, 300 mg at 04/14/22 8944    insulin glargine (LANTUS) injection 32 Units, 32 Units, SubCUTAneous, BID, Israel Soto MD, 32 Units at 04/15/22 0834    artificial saliva (MOUTH KOTE) 1 Spray, 1 Spray, Oral, PRN, Israel Soto MD    amLODIPine (NORVASC) tablet 5 mg, 5 mg, Oral, DAILY, Israel Soto MD, 5 mg at 04/14/22 0848    metoprolol tartrate (LOPRESSOR) tablet 50 mg, 50 mg, Oral, BID, Israel Stoo MD, 50 mg at 04/14/22 2158    nitroglycerin (NITROBID) 2 % ointment 1 Inch, 1 Inch, Topical, Q6H, Wilder Thomas MD, 1 Inch at 04/15/22 0605    hydrALAZINE (APRESOLINE) tablet 50 mg, 50 mg, Oral, TID, Israel Soto MD, 50 mg at 04/14/22 2158    dextrose 5% infusion, 100 mL/hr, IntraVENous, CONTINUOUS, Kalpesh Lara MD, Last Rate: 100 mL/hr at 04/14/22 0357, 100 mL/hr at 04/14/22 0357    [Held by provider] furosemide (LASIX) injection 40 mg, 40 mg, IntraVENous, DAILY, Israel Soto MD, 40 mg at 04/09/22 1054    VANCOMYCIN INFORMATION NOTE, , Other, Rx Dosing/Monitoring, Abi Meyer MD    labetaloL (NORMODYNE;TRANDATE) 20 mg/4 mL (5 mg/mL) injection 20 mg, 20 mg, IntraVENous, Q4H PRN, Israel Soto MD, 20 mg at 04/15/22 0255    hydrALAZINE (APRESOLINE) 20 mg/mL injection 20 mg, 20 mg, IntraVENous, Q6H PRN, Israel Soto MD, 20 mg at 04/12/22 0257    0.9% sodium chloride infusion 250 mL, 250 mL, IntraVENous, PRN, Martínez Crespo MD    insulin lispro (HUMALOG) injection, , SubCUTAneous, Q6H, Israel Soto MD, 10 Units at 04/15/22 0605    atorvastatin (LIPITOR) tablet 20 mg, 20 mg, Oral, QHS, Israel Soto MD, 20 mg at 04/14/22 2158    latanoprost (XALATAN) 0.005 % ophthalmic solution 1 Drop, 1 Drop, Right Eye, QPM, Israel Soto MD, 1 Drop at 04/14/22 1905    aspirin delayed-release tablet 81 mg, 81 mg, Oral, DAILY, Israel Soto MD, 81 mg at 04/14/22 0847    brimonidine (ALPHAGAN) 0.2 % ophthalmic solution 1 Drop, 1 Drop, Both Eyes, TID, Israel Soto MD, 1 Drop at 04/15/22 0836    glucose chewable tablet 16 g, 4 Tablet, Oral, PRN, Eunice Soto MD    glucagon [facility]) injection 1 mg, 1 mg, IntraMUSCular, PRN, MD Timothy Lee  [DISCONTINUED] acetaminophen (TYLENOL) tablet 650 mg, 650 mg, Oral, Q6H PRN, 650 mg at 04/04/22 2246 **OR** acetaminophen (TYLENOL) suppository 650 mg, 650 mg, Rectal, Q6H PRN, Eunice Soto MD    polyethylene glycol (MIRALAX) packet 17 g, 17 g, Oral, DAILY PRN, Eunice Soto MD    ondansetron (ZOFRAN ODT) tablet 4 mg, 4 mg, Oral, Q8H PRN **OR** ondansetron (ZOFRAN) injection 4 mg, 4 mg, IntraVENous, Q6H PRN, Israel Soto MD    heparin (porcine) injection 5,000 Units, 5,000 Units, SubCUTAneous, Q8H, Israel Soto MD, 5,000 Units at 04/15/22 0834    piperacillin-tazobactam (ZOSYN) 3.375 g in 0.9% sodium chloride (MBP/ADV) 100 mL MBP, 3.375 g, IntraVENous, Q8H, Israel Soto MD, Last Rate: 25 mL/hr at 04/15/22 0255, 3.375 g at 04/15/22 0255    albuterol-ipratropium (DUO-NEB) 2.5 MG-0.5 MG/3 ML, 3 mL, Nebulization, Q6H PRN, Eunice Soto MD    ferrous sulfate 300 mg (60 mg iron)/5 mL oral syrup 300 mg, 300 mg, Oral, BID, Israel Soto MD, 300 mg at 04/14/22 2158    acetaminophen (TYLENOL) solution 650 mg, 650 mg, Per NG tube, Q6H PRN, Israel Soto MD, 650 mg at 04/14/22 2158    Current Outpatient Medications   Medication Instructions    acidophilus-pectin, citrus 25 million cell -100 mg tab tablet 2 Tablets, Oral, DAILY    amLODIPine (NORVASC) 5 mg, Oral, DAILY    artificial saliva (MOUTH KOTE) spra 1 Spray, Oral, 3 TIMES DAILY    aspirin delayed-release 81 mg tablet Oral, DAILY    atorvastatin (LIPITOR) 20 mg tablet TAKE 1 TABLET BY MOUTH EVERY NIGHT    brimonidine (ALPHAGAN) 0.2 % ophthalmic solution 1 Drop, Both Eyes, 3 TIMES DAILY    ferrous sulfate 325 mg, Oral, 2 TIMES DAILY    gabapentin (NEURONTIN) 300 mg, Oral, 2 TIMES DAILY    insulin glargine (LANTUS) 50 Units, SubCUTAneous, DAILY    latanoprost (XALATAN) 0.005 % ophthalmic solution 1 Drop, Right Eye, EVERY EVENING    lidocaine (XYLOCAINE) 4 % (40 mg/mL) topical solution 1 mL, Topical, AS NEEDED    metoprolol succinate (TOPROL-XL) 50 mg, Oral, 2 TIMES DAILY         Signed By: Amada End     April 15, 2022

## 2022-04-15 NOTE — PROGRESS NOTES
Problem: Falls - Risk of  Goal: *Absence of Falls  Description: Document Angie Mackeyyordy Fall Risk and appropriate interventions in the flowsheet.   Outcome: Progressing Towards Goal  Note: Fall Risk Interventions:  Mobility Interventions: Bed/chair exit alarm    Mentation Interventions: Bed/chair exit alarm    Medication Interventions: Bed/chair exit alarm    Elimination Interventions: Bed/chair exit alarm

## 2022-04-16 NOTE — PROGRESS NOTES
IMPRESSION:   1. Sepsis  2. Aspiration pneumonia  3. Clogged PEG tube  4. Urinary tract infection  5. Acute kidney injury  6. Hyperkalemia resolved  7. History of CVA with right-sided weakness  8. Anemia  9. Hypernatremia  10. Hyperkalemia resolved  11. Gangrene of the left great toe sacral decubiti ulcer      RECOMMENDATIONS/PLAN:   1. She is on Zosyn   2. Lasix on hold  3. Clogged PEG tube corrected she is back on tube feeding  4. ABG acceptable she is on room  5. Chest x-ray no acute infiltrate  6. WBC elevated most likely secondary to steroids  7. Recieve packed RBC      [x] High complexity decision making was performed  [x] See my orders for details  HPI  82-year-old lady nursing home resident came in as she was found confused with altered mental status family noted that she was not acting properly she is in a nursing home unable to get any history to the patient she has significant past medical history of CVA right-sided weakness hypertension diabetes mellitus she is bedridden open eyes does not follow any command possibly septic so admitted, and critical care consult was called. PMH:  has a past medical history of Diabetes mellitus ([address] HTN (hypertension), Hyperlipidemia, Neuropathy, PAD (peripheral artery disease) ([address] and Sciatica. PSH:   has a past surgical history that includes hx other surgical (Bilateral); hx amputation (Right); hx other surgical; hx cervical fusion; hx vascular stent (Bilateral); and hx vascular stent (Bilateral). FHX: family history includes Cancer in her mother; Diabetes in her mother; Hypertension in her mother. SHX:  reports that she has never smoked. She has never used smokeless tobacco. She reports previous alcohol use. She reports that she does not use drugs.     ALL: No Known Allergies     MEDS:   [x] Reviewed - As Below   [] Not reviewed    Current Facility-Administered Medications   Medication    fluconazole (DIFLUCAN) 200mg/100 mL IVPB (premix)    gabapentin (NEURONTIN) capsule 300 mg    insulin glargine (LANTUS) injection 32 Units    artificial saliva (MOUTH KOTE) 1 Spray    amLODIPine (NORVASC) tablet 5 mg    metoprolol tartrate (LOPRESSOR) tablet 50 mg    nitroglycerin (NITROBID) 2 % ointment 1 Inch    hydrALAZINE (APRESOLINE) tablet 50 mg    dextrose 5% infusion    [Held by provider] furosemide (LASIX) injection 40 mg    labetaloL (NORMODYNE;TRANDATE) 20 mg/4 mL (5 mg/mL) injection 20 mg    hydrALAZINE (APRESOLINE) 20 mg/mL injection 20 mg    0.9% sodium chloride infusion 250 mL    insulin lispro (HUMALOG) injection    atorvastatin (LIPITOR) tablet 20 mg    latanoprost (XALATAN) 0.005 % ophthalmic solution 1 Drop    aspirin delayed-release tablet 81 mg    brimonidine (ALPHAGAN) 0.2 % ophthalmic solution 1 Drop    glucose chewable tablet 16 g    glucagon (GLUCAGEN) injection 1 mg    acetaminophen (TYLENOL) suppository 650 mg    polyethylene glycol (MIRALAX) packet 17 g    ondansetron (ZOFRAN ODT) tablet 4 mg    Or    ondansetron (ZOFRAN) injection 4 mg    heparin (porcine) injection 5,000 Units    piperacillin-tazobactam (ZOSYN) 3.375 g in 0.9% sodium chloride (MBP/ADV) 100 mL MBP    albuterol-ipratropium (DUO-NEB) 2.5 MG-0.5 MG/3 ML    ferrous sulfate 300 mg (60 mg iron)/5 mL oral syrup 300 mg    acetaminophen (TYLENOL) solution 650 mg      MAR reviewed and pertinent medications noted or modified as needed   Current Facility-Administered Medications   Medication    fluconazole (DIFLUCAN) 200mg/100 mL IVPB (premix)    gabapentin (NEURONTIN) capsule 300 mg    insulin glargine (LANTUS) injection 32 Units    artificial saliva (MOUTH KOTE) 1 Spray    amLODIPine (NORVASC) tablet 5 mg    metoprolol tartrate (LOPRESSOR) tablet 50 mg    nitroglycerin (NITROBID) 2 % ointment 1 Inch    hydrALAZINE (APRESOLINE) tablet 50 mg    dextrose 5% infusion    [Held by provider] furosemide (LASIX) injection 40 mg    labetaloL (NORMODYNE;TRANDATE) 20 mg/4 mL (5 mg/mL) injection 20 mg    hydrALAZINE (APRESOLINE) 20 mg/mL injection 20 mg    0.9% sodium chloride infusion 250 mL    insulin lispro (HUMALOG) injection    atorvastatin (LIPITOR) tablet 20 mg    latanoprost (XALATAN) 0.005 % ophthalmic solution 1 Drop    aspirin delayed-release tablet 81 mg    brimonidine (ALPHAGAN) 0.2 % ophthalmic solution 1 Drop    glucose chewable tablet 16 g    glucagon (GLUCAGEN) injection 1 mg    acetaminophen (TYLENOL) suppository 650 mg    polyethylene glycol (MIRALAX) packet 17 g    ondansetron (ZOFRAN ODT) tablet 4 mg    Or    ondansetron (ZOFRAN) injection 4 mg    heparin (porcine) injection 5,000 Units    piperacillin-tazobactam (ZOSYN) 3.375 g in 0.9% sodium chloride (MBP/ADV) 100 mL MBP    albuterol-ipratropium (DUO-NEB) 2.5 MG-0.5 MG/3 ML    ferrous sulfate 300 mg (60 mg iron)/5 mL oral syrup 300 mg    acetaminophen (TYLENOL) solution 650 mg      PMH:  has a past medical history of Diabetes mellitus ([address] HTN (hypertension), Hyperlipidemia, Neuropathy, PAD (peripheral artery disease) ([facility] [facility] and Sciatica. PSH:   has a past surgical history that includes hx other surgical (Bilateral); hx amputation (Right); hx other surgical; hx cervical fusion; hx vascular stent (Bilateral); and hx vascular stent (Bilateral). FHX: family history includes Cancer in her mother; Diabetes in her mother; Hypertension in her mother. SHX:  reports that she has never smoked. She has never used smokeless tobacco. She reports previous alcohol use. She reports that she does not use drugs. ROS:  Unable to obtain barely unresponsive    Hemodynamics:    CO:    CI:    CVP:    SVR:   PAP Systolic:    PAP Diastolic:    PVR:    DU59:        Ventilator Settings:      Mode Rate TV Press PEEP FiO2 PIP Min.  Vent                              Vital Signs: Telemetry:    normal sinus rhythm Intake/Output:   Visit Vitals  BP (!) 155/65 (BP 1 Location: Right upper arm, BP Patient Position: At rest;Supine)   Pulse 75   Temp 98.5 °F (36.9 °C)   Resp 19   Ht 5' 8\" (1.727 m)   Wt 86.6 kg (191 lb)   SpO2 100%   BMI 29.04 kg/m²       Temp (24hrs), Av.3 °F (37.4 °C), Min:98.5 °F (36.9 °C), Max:100.1 °F (37.8 °C)        O2 Device: None (Room air) O2 Flow Rate (L/min): 0 l/min       Wt Readings from Last 4 Encounters:   04/10/22 86.6 kg (191 lb)   22 82.2 kg (181 lb 3.5 oz)   20 84.4 kg (186 lb)   20 84.8 kg (187 lb)          Intake/Output Summary (Last 24 hours) at 2022 1002  Last data filed at 2022 0653  Gross per 24 hour   Intake 2220 ml   Output 1875 ml   Net 345 ml       Last shift:      No intake/output data recorded. Last 3 shifts: 1901 -  0700  In: 2500   Out: 3975 [Urine:3775]       Physical Exam:     General: Open eyes but does not follow any command  HEENT: NCAT, poor dentition, lips and mucosa dry  Eyes: anicteric; conjunctiva clear  Neck: no nodes, trach midline; no accessory MM use. Chest: no deformity,   Cardiac: R regular; no murmur;   Lungs: distant breath sounds; no wheezes  Abd: soft, NT, hypoactive BS  Ext: no edema; no joint swelling;  No clubbing  : NO giraldo, clear urine  Neuro: She only moves her left arm  Psych- unable to assess  Skin: warm, dry, no cyanosis;   Pulses: 1-2+ Bilateral pedal, radial  Capillary: brisk; pale      DATA:    MAR reviewed and pertinent medications noted or modified as needed  MEDS:   Current Facility-Administered Medications   Medication    fluconazole (DIFLUCAN) 200mg/100 mL IVPB (premix)    gabapentin (NEURONTIN) capsule 300 mg    insulin glargine (LANTUS) injection 32 Units    artificial saliva (MOUTH KOTE) 1 Spray    amLODIPine (NORVASC) tablet 5 mg    metoprolol tartrate (LOPRESSOR) tablet 50 mg    nitroglycerin (NITROBID) 2 % ointment 1 Inch    hydrALAZINE (APRESOLINE) tablet 50 mg    dextrose 5% infusion    [Held by provider] furosemide (LASIX) injection 40 mg    labetaloL (NORMODYNE;TRANDATE) 20 mg/4 mL (5 mg/mL) injection 20 mg    hydrALAZINE (APRESOLINE) 20 mg/mL injection 20 mg    0.9% sodium chloride infusion 250 mL    insulin lispro (HUMALOG) injection    atorvastatin (LIPITOR) tablet 20 mg    latanoprost (XALATAN) 0.005 % ophthalmic solution 1 Drop    aspirin delayed-release tablet 81 mg    brimonidine (ALPHAGAN) 0.2 % ophthalmic solution 1 Drop    glucose chewable tablet 16 g    glucagon (GLUCAGEN) injection 1 mg    acetaminophen (TYLENOL) suppository 650 mg    polyethylene glycol (MIRALAX) packet 17 g    ondansetron (ZOFRAN ODT) tablet 4 mg    Or    ondansetron (ZOFRAN) injection 4 mg    heparin (porcine) injection 5,000 Units    piperacillin-tazobactam (ZOSYN) 3.375 g in 0.9% sodium chloride (MBP/ADV) 100 mL MBP    albuterol-ipratropium (DUO-NEB) 2.5 MG-0.5 MG/3 ML    ferrous sulfate 300 mg (60 mg iron)/5 mL oral syrup 300 mg    acetaminophen (TYLENOL) solution 650 mg        Labs:    Recent Labs     04/16/22  0750 04/15/22  0728 04/14/22  1145   WBC 13.7* 15.9* 18.4*   HGB 9.7* 9.5* 9.8*    256 263     Recent Labs     04/16/22  0750 04/15/22  1104 04/14/22  0738    147* 152*   K 4.8 5.1 5.0   * 118* 122*   CO2 28 27 27   * 284* 342*   BUN 42* 47* 56*   CREA 0.96 0.96 1.19*   CA 8.0* 7.9* 8.3*   ALB 1.5*  --  1.6*   ALT 53  --  41     No results for input(s): PH, PCO2, PO2, HCO3, FIO2 in the last 72 hours. No results for input(s): CPK, CKNDX, TROIQ in the last 72 hours. No lab exists for component: CPKMB  No results found for: BNPP, BNP   Lab Results   Component Value Date/Time    Culture result:  04/10/2022 08:00 AM     One of four bottles has been flagged positive by instrument. Bottle has been sent to [facility] laboratory to assess for possible growth. No organisms seen on Gram stain. Multiple subcultures are in progress.  No growth thus far    Culture result: Pseudomonas aeruginosa (A) 04/07/2022 02:00 PM    Culture result: No growth 7 days 04/03/2022 10:43 PM     Lab Results   Component Value Date/Time    TSH 2.880 12/12/2016 10:13 AM        Imaging:    Results from [place] encounter on 04/03/22    XR CHEST PORT    Narrative  Chest, frontal view, 4/11/2022    History: CHF. Comparison: Chest 4/9/2022. Findings: Surgical hardware at the cervical spine is incompletely imaged. The  cardiac silhouette is within normal limits. The lungs are underexpanded. No  hydrostatic edema is present. No focal consolidation, pleural effusions or  pneumothorax is identified. Degenerative changes are present in the shoulders  and thoracic spine. Impression  No acute pulmonary process. Results from [place] encounter on 04/03/22    CT HEAD WO CONT    Narrative  PROCEDURE: CT HEAD WO CONT    HISTORY:Altered mental status    COMPARISON:Head CT 3 March 2022    Department policy stipulates all CT scans at this facility are performed using  dose reduction optimization techniques as appropriate to the performed exam,  including the following: Automated exposure control, adjustments of the mA  and/or KVP according to the patient size, and the use of iterative  reconstruction technique. TECHNIQUE: Without IV contrast    Bones/extracranial soft tissues:  Maxillary sinuses and right frontal sinus  remains nearly completely opacified. Extra-axial spaces:  No hemorrhage, mass or focal fluid collection. Ventricles/sulci:  There is mild dilatation of the ventricles. Sulci are  prominent. Brain parenchyma: An area of encephalomalacia in the left frontal lobe is  showing chronic evolution without evidence of hemorrhagic conversion. No other  focal lesions identified. No mass effect. There is good gray-white differentiation. There are  inhomogeneous areas of mildly decreased density in periventricular white matter. Impression  Chronic changes which appear stable. No acute or active intracranial process.   Chronic paranasal sinus disease

## 2022-04-16 NOTE — PROGRESS NOTES
Progress Note    Patient: Jose Escobedo MRN: [id number]  SSN: [id number]    YOB: 1945  Age: 78 y.o. Sex: female      Admit Date: 4/3/2022    LOS: 12 days     Subjective:     78years old patient with history of multiple strokes in the past history of GI tract infection admitted for acute kidney injury sepsis with UTI due to Pseudomonas on yeast.  Patient is on IV Zosyn and Diflucan. Patient is also being treated for possible aspiration pneumonia she has a PEG tube that was clogged up today she is noncommunicative    Objective:     Vitals:    04/15/22 2100 04/16/22 0034 04/16/22 0558 04/16/22 0759   BP: (!) 156/70 138/67 (!) 161/74 (!) 155/65   Pulse: 76 67 76 75   Resp: 21 18 19   Temp: 100.1 °F (37.8 °C) 99.2 °F (37.3 °C)  98.5 °F (36.9 °C)   SpO2: 99% 98%  100%   Weight:       Height:            Intake and Output:  Current Shift: No intake/output data recorded. Last three shifts: 04/14 1901 - 04/16 0700  In: 2500   Out: 3975 [Urine:3775]    Physical Exam:   General:  Alert, cooperative, no distress, appears stated age. Eyes:  Conjunctivae/corneas clear. PERRL, EOMs intact. Fundi benign   Ears:  Normal TMs and external ear canals both ears. Nose: Nares normal. Septum midline. Mucosa normal. No drainage or sinus tenderness. Mouth/Throat: Lips, mucosa, and tongue normal. Teeth and gums normal.   Neck: Supple, symmetrical, trachea midline, no adenopathy, thyroid: no enlargment/tenderness/nodules, no carotid bruit and no JVD. Back:   Symmetric, no curvature. ROM normal. No CVA tenderness. Lungs:   Clear to auscultation bilaterally. Heart:  Regular rate and rhythm, S1, S2 normal, no murmur, click, rub or gallop. Abdomen:   Soft, non-tender. Bowel sounds normal. No masses,  No organomegaly. Extremities: Extremities decline in function , atraumatic, no cyanosis or edema. Pulses: 2+ and symmetric all extremities.    Skin: Skin color, texture, turgor normal. No rashes or lesions Lymph nodes: Cervical, supraclavicular, and axillary nodes normal.   Neurologic: CNII-XII intact poor strengthn and reflexes throughout. Bedbound  Lab/Data Review: All lab results for the last 24 hours reviewed. Recent Results (from the past 24 hour(s))   GLUCOSE, POC    Collection Time: 04/15/22  6:15 PM   Result Value Ref Range    Glucose (POC) 269 (H) 65 - 117 mg/dL    Performed by Nery MILLER, POC    Collection Time: 04/16/22 12:36 AM   Result Value Ref Range    Glucose (POC) 316 (H) 65 - 117 mg/dL    Performed by Destiny Robledo, POC    Collection Time: 04/16/22  5:49 AM   Result Value Ref Range    Glucose (POC) 293 (H) 65 - 117 mg/dL    Performed by [address] [place] [address], COMPREHENSIVE    Collection Time: 04/16/22  7:50 AM   Result Value Ref Range    Sodium 145 136 - 145 mmol/L    Potassium 4.8 3.5 - 5.1 mmol/L    Chloride 114 (H) 97 - 108 mmol/L    CO2 28 21 - 32 mmol/L    Anion gap 3 (L) 5 - 15 mmol/L    Glucose 275 (H) 65 - 100 mg/dL    BUN 42 (H) 6 - 20 mg/dL    Creatinine 0.96 0.55 - 1.02 mg/dL    BUN/Creatinine ratio 44 (H) 12 - 20      GFR est AA >60 >60 ml/min/1.73m2    GFR est non-AA 57 (L) >60 ml/min/1.73m2    Calcium 8.0 (L) 8.5 - 10.1 mg/dL    Bilirubin, total 0.2 0.2 - 1.0 mg/dL    AST (SGOT) 48 (H) 15 - 37 U/L    ALT (SGPT) 53 12 - 78 U/L    Alk.  phosphatase 87 45 - 117 U/L    Protein, total 5.6 (L) 6.4 - 8.2 g/dL    Albumin 1.5 (L) 3.5 - 5.0 g/dL    Globulin 4.1 (H) 2.0 - 4.0 g/dL    A-G Ratio 0.4 (L) 1.1 - 2.2     CBC WITH AUTOMATED DIFF    Collection Time: 04/16/22  7:50 AM   Result Value Ref Range    WBC 13.7 (H) 3.6 - 11.0 K/uL    RBC 3.45 (L) 3.80 - 5.20 M/uL    HGB 9.7 (L) 11.5 - 16.0 g/dL    HCT 31.9 (L) 35.0 - 47.0 %    MCV 92.5 80.0 - 99.0 FL    MCH 28.1 26.0 - 34.0 PG    MCHC 30.4 30.0 - 36.5 g/dL    RDW 16.3 (H) 11.5 - 14.5 %    PLATELET 606 736 - 356 K/uL    MPV 12.3 8.9 - 12.9 FL    NRBC 0.1 (H) 0.0  WBC    ABSOLUTE NRBC 0.02 (H) 0.00 - 0.01 K/uL    NEUTROPHILS 83 (H) 32 - 75 %    LYMPHOCYTES 11 (L) 12 - 49 %    MONOCYTES 3 (L) 5 - 13 %    EOSINOPHILS 2 0 - 7 %    BASOPHILS 0 0 - 1 %    IMMATURE GRANULOCYTES 1 (H) 0 - 0.5 %    ABS. NEUTROPHILS 11.3 (H) 1.8 - 8.0 K/UL    ABS. LYMPHOCYTES 1.6 0.8 - 3.5 K/UL    ABS. MONOCYTES 0.4 0.0 - 1.0 K/UL    ABS. EOSINOPHILS 0.2 0.0 - 0.4 K/UL    ABS. BASOPHILS 0.0 0.0 - 0.1 K/UL    ABS. IMM. GRANS. 0.2 (H) 0.00 - 0.04 K/UL    DF AUTOMATED     PROCALCITONIN    Collection Time: 04/16/22  7:50 AM   Result Value Ref Range    Procalcitonin 1.55 (H) 0 ng/mL   C REACTIVE PROTEIN, QT    Collection Time: 04/16/22  7:50 AM   Result Value Ref Range    C-Reactive protein 5.89 (H) 0.00 - 0.60 mg/dL   GLUCOSE, POC    Collection Time: 04/16/22 11:41 AM   Result Value Ref Range    Glucose (POC) 247 (H) 65 - 117 mg/dL    Performed by Jade Levine          Assessment:     Active Problems:    Hyperkalemia (4/4/2022)      UTI (urinary tract infection) (4/4/2022)      Sepsis ([facility] (4/4/2022)      RICARDO (acute kidney injury) ([facility] [address] (4/4/2022)      AMS (altered mental status) (4/4/2022)      Moderate to severe malnutrition with albumin of 1.5  UTI with Pseudomonas and yeast  History of multiple CVAs  Status post PEG tube placement  Bedbound  Plan:   Continue on IV Zosyn and vancomycin with Diflucan.     Signed By: Soheila Rodriguez MD     April 16, 2022

## 2022-04-16 NOTE — ED PROVIDER NOTES
Other Procedure    Date/Time: 4/16/2022 11:54 AM  Performed by: Saul Gan NP  Authorized by: Saul Gan NP     Consent:     Consent obtained:  Verbal    Consent given by:  Patient    Risks discussed:  Bleeding, incomplete drainage, infection, nerve damage, pain and poor cosmetic result    Alternatives discussed:  No treatment  Indications:     Indications:  PEG tube replacement  Pre-procedure details:     Skin preparation:  Antiseptic wash  Anesthesia (see MAR for exact dosages): Anesthesia method:  None  Post-procedure details:     Patient tolerance of procedure: Tolerated well, no immediate complications  Comments:      Clogged 20 [place] PEG tube removed. New 20 [place] PEG tube replaced without any difficulty. Will obtain KUB to verify placement.

## 2022-04-16 NOTE — PROGRESS NOTES
Renal Daily Progress Note    Admit Date: 4/3/2022      Subjective:   Her eyes are open. Attempted to communicate but could not understand new words.   Not in respiratory distress  On tube feeds    Current Facility-Administered Medications   Medication Dose Route Frequency    fluconazole (DIFLUCAN) 200mg/100 mL IVPB (premix)  200 mg IntraVENous Q24H    gabapentin (NEURONTIN) capsule 300 mg  300 mg Oral BID    insulin glargine (LANTUS) injection 32 Units  32 Units SubCUTAneous BID    artificial saliva (MOUTH KOTE) 1 Spray  1 Spray Oral PRN    amLODIPine (NORVASC) tablet 5 mg  5 mg Oral DAILY    metoprolol tartrate (LOPRESSOR) tablet 50 mg  50 mg Oral BID    nitroglycerin (NITROBID) 2 % ointment 1 Inch  1 Inch Topical Q6H    hydrALAZINE (APRESOLINE) tablet 50 mg  50 mg Oral TID    dextrose 5% infusion  80 mL/hr IntraVENous CONTINUOUS    [Held by provider] furosemide (LASIX) injection 40 mg  40 mg IntraVENous DAILY    labetaloL (NORMODYNE;TRANDATE) 20 mg/4 mL (5 mg/mL) injection 20 mg  20 mg IntraVENous Q4H PRN    hydrALAZINE (APRESOLINE) 20 mg/mL injection 20 mg  20 mg IntraVENous Q6H PRN    0.9% sodium chloride infusion 250 mL  250 mL IntraVENous PRN    insulin lispro (HUMALOG) injection   SubCUTAneous Q6H    atorvastatin (LIPITOR) tablet 20 mg  20 mg Oral QHS    latanoprost (XALATAN) 0.005 % ophthalmic solution 1 Drop  1 Drop Right Eye QPM    aspirin delayed-release tablet 81 mg  81 mg Oral DAILY    brimonidine (ALPHAGAN) 0.2 % ophthalmic solution 1 Drop  1 Drop Both Eyes TID    glucose chewable tablet 16 g  4 Tablet Oral PRN    glucagon (GLUCAGEN) injection 1 mg  1 mg IntraMUSCular PRN    acetaminophen (TYLENOL) suppository 650 mg  650 mg Rectal Q6H PRN    polyethylene glycol (MIRALAX) packet 17 g  17 g Oral DAILY PRN    ondansetron (ZOFRAN ODT) tablet 4 mg  4 mg Oral Q8H PRN    Or    ondansetron (ZOFRAN) injection 4 mg  4 mg IntraVENous Q6H PRN    heparin (porcine) injection 5,000 Units 5,000 Units SubCUTAneous Q8H    piperacillin-tazobactam (ZOSYN) 3.375 g in 0.9% sodium chloride (MBP/ADV) 100 mL MBP  3.375 g IntraVENous Q8H    albuterol-ipratropium (DUO-NEB) 2.5 MG-0.5 MG/3 ML  3 mL Nebulization Q6H PRN    ferrous sulfate 300 mg (60 mg iron)/5 mL oral syrup 300 mg  300 mg Oral BID    acetaminophen (TYLENOL) solution 650 mg  650 mg Per NG tube Q6H PRN        Review of Systems    ROS   Obtainable  Objective:     Patient Vitals for the past 8 hrs:   BP Temp Pulse Resp SpO2   04/16/22 0759 (!) 155/65 98.5 °F (36.9 °C) 75 19 100 %   04/16/22 0558 (!) 161/74 -- 76 -- --     No intake/output data recorded. 04/14 1901 - 04/16 0700  In: 2500   Out: 3975 [Urine:3775]    Physical Exam:   Physical Exam  Cardiovascular:      Rate and Rhythm: Regular rhythm. Pulmonary:      Breath sounds: Normal breath sounds. Abdominal:      General: Bowel sounds are normal.      Palpations: Abdomen is soft. Comments: On PEG feeding   Skin:     General: Skin is warm. Absent toes on the right foot        XR CHEST PORT   Final Result   No acute pulmonary process. XR CHEST PORT   Final Result   No significant change. XR CHEST PORT   Final Result      XR CHEST PORT   Final Result   No acute cardiopulmonary abnormality. .       XR CHEST PORT   Final Result      CT HEAD WO CONT   Final Result   Chronic changes which appear stable. No acute or active intracranial process. Chronic paranasal sinus disease         XR CHEST PORT   Final Result   No acute findings.            Data Review   Recent Labs     04/16/22  0750 04/15/22  0728 04/14/22  1145   WBC 13.7* 15.9* 18.4*   HGB 9.7* 9.5* 9.8*   HCT 31.9* 31.6* 32.5*    256 263     Recent Labs     04/16/22  0750 04/15/22  1104 04/14/22  0738    147* 152*   K 4.8 5.1 5.0   * 118* 122*   CO2 28 27 27   * 284* 342*   BUN 42* 47* 56*   CREA 0.96 0.96 1.19*   CA 8.0* 7.9* 8.3*   ALB 1.5*  --  1.6*   ALT 53  --  41     No components found for: [place]  No results for input(s): PH, PCO2, PO2, HCO3, FIO2 in the last 72 hours. No results for input(s): INR, INREXT, INREXT, INREXT in the last 72 hours. Assessment:           Active Problems:    Hyperkalemia (4/4/2022)      UTI (urinary tract infection) (4/4/2022)      Sepsis ([facility] [address] (4/4/2022)      RICARDO (acute kidney injury) ([facility] [address] (4/4/2022)      AMS (altered mental status) (4/4/2022)    Acute kidney injury on CKD. -Resolved    Hypernatremia-improving    Leukocytosis -improving. CVA    Pseudomonas aeruginosa urinary tract infection    Diabetes mellitus uncontrolled    Plan:   Continue current IV fluids  Sodium is trending down to 145  Blood glucoses are improved as well. We will discontinue D5W tomorrow.       Thank you for letting us follow

## 2022-04-16 NOTE — PROGRESS NOTES
Dr Victor Hugo Rossi and aware of Patient Peg tube being clogged. ER NP Britta Huynh Stated he will Replace PegTube at bedside. Peg Tube 20fr Replace by NP Britta Huynh order given not use until KUB is done for placement.

## 2022-04-17 NOTE — PROGRESS NOTES
Progress Note    Patient: Deanne Delgado MRN: [id number]  SSN: [id number]    YOB: 1945  Age: 78 y.o. Sex: female      Admit Date: 4/3/2022    LOS: 13 days     Subjective:   Patient followed for sepsis with UTI secondary to Pseudomonas sensitive to Zosyn, nearing completion but now with Candida UTI on Fluconazole. WBC, procal and CRP decreasing. Patient  Remains nonverbal but appeared to be resting comfortably. Daughter at bedside stated that she spoke a few words earlier today. Objective:     Vitals:    04/16/22 2200 04/16/22 2335 04/17/22 0506 04/17/22 0838   BP:  128/66 137/64 (!) 160/70   Pulse:  66 73 70   Resp:   18 18   Temp: 98.2 °F (36.8 °C)  99.6 °F (37.6 °C) 98.8 °F (37.1 °C)   SpO2:   100% 100%   Weight:       Height:            Intake and Output:  Current Shift: 04/17 0701 - 04/17 1900  In: 220   Out: -   Last three shifts: 04/15 1901 - 04/17 0700  In: 2220   Out: 975 [Urine:775]    Physical Exam:   Vitals and nursing note reviewed. Constitutional:       General: She is not in acute distress. Appearance: She is ill-appearing. HENT: eyes closed, Room Air   Cardiovascular:      Rate and Rhythm: Normal rate and regular rhythm. Heart sounds: No murmur heard. Pulmonary:      Effort: Pulmonary effort is normal.      Breath sounds: Normal breath sounds. Abdominal:      General: Bowel sounds are normal.      Palpations: Abdomen is soft. Tenderness: There is no abdominal tenderness. Comments: PEG tube in place, site unremarkable  Genitourinary:     Comments: Bell catheter with moderate urine sediment  Flexiseal with empty bag at this time  Musculoskeletal:      Right lower leg: No edema. Left lower leg: No edema. Comments: Left great toe with dry gangrene, dark and shrunken   Skin: Stage 2 sacral wound     Findings: No rash. Neurological:      Mental Status: She is alert.       Comments: Unable to assess   Psychiatric:      Comments: Unable to assess      Lab/Data Review:     WBC 13,300  UA with WBC >100, budding yeasts    Procal 1.20 <1.55 <2.01 <2.42 < 2.28 <1.93 <1.64 <3.37 <3.78 <4.78 <6.93  CRP 7.79 <5.89 <6.55 < 7.65 <3.14 <2.33 <3.44 <11.50 < 14.50 <16.10 <21.40 <28.40    Blood cultures (4/3) No growth FINAL  Blood cultures (4/10)  No growth FINAL  Urine culture (4/7) >100,000 cfu/ml Pseudomonas aeruginosa   Urine culture (4/14) >100,000 cfu/ml mixed urogenital yasmine    CXR (4/11) No acute pulmonary process    Assessment:     Active Problems:    Hyperkalemia (4/4/2022)      UTI (urinary tract infection) (4/4/2022)      Sepsis ([facility] [address] (4/4/2022)      RICARDO (acute kidney injury) ([facility] [address] (4/4/2022)      AMS (altered mental status) (4/4/2022)    1. Sepsis with fever and leukocytosis, elevated procal and CRP, resolving  2. UTI with marked pyuria and bacteriuria, secondary to Pseudomonas aeruginosa, Day #14 IV Zosyn  3. Altered mentals status, multifactorial including sepsis  4. Uncontrolled diabetes mellitus with hyperglycemia  5. Chronic maxillary sinusitis  6. ASCVD with prior stroke  7. Hepatitis C antibody positive, HCV RNA negative  8. 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 Sacral wound, Stage 2, no evidence of gross infection  10. Acute hypoxic respiratory failure, resolved  11. Diarrhea, presumed secondary to tube feeding  12. Candida UTI, presumptive, with marked pyuria and yeasts, Day #3 IV Fluconazole    Comment:  WBC and procal decreasing but CRP increased. Plan:   1. Discontinue IV Zosyn after today  2. Continue Fluconazole   3. Follow-up blood cultures  4.  In am, repeat CBC, procal and CRP        Signed By: Pipo Beckman MD     April 17, 2022

## 2022-04-17 NOTE — PROGRESS NOTES
Problem: Falls - Risk of  Goal: *Absence of Falls  Description: Document Angie Mackeyyordy Fall Risk and appropriate interventions in the flowsheet.   Outcome: Progressing Towards Goal  Note: Fall Risk Interventions:  Mobility Interventions: Communicate number of staff needed for ambulation/transfer    Mentation Interventions: Bed/chair exit alarm    Medication Interventions: Bed/chair exit alarm    Elimination Interventions: Bed/chair exit alarm

## 2022-04-17 NOTE — PROGRESS NOTES
Progress Note    Patient: Yamini Ayala MRN: [id number]  SSN: [id number]    YOB: 1945  Age: 78 y.o. Sex: female      Admit Date: 4/3/2022    LOS: 13 days     Subjective:     78years old patient with UTI on IV antibiotics daughter is by the bedside    Objective:     Vitals:    04/16/22 2200 04/16/22 2335 04/17/22 0506 04/17/22 0838   BP:  128/66 137/64 (!) 160/70   Pulse:  66 73 70   Resp:   18 18   Temp: 98.2 °F (36.8 °C)  99.6 °F (37.6 °C) 98.8 °F (37.1 °C)   SpO2:   100% 100%   Weight:       Height:            Intake and Output:  Current Shift: 04/17 0701 - 04/17 1900  In: 220   Out: 400 [Urine:400]  Last three shifts: 04/15 1901 - 04/17 0700  In: 2220   Out: 975 [Urine:775]    Physical Exam:   General:  Alert, cooperative, no distress, appears stated age. Eyes:  Conjunctivae/corneas clear. PERRL, EOMs intact. Fundi benign   Ears:  Normal TMs and external ear canals both ears. Nose: Nares normal. Septum midline. Mucosa normal. No drainage or sinus tenderness. Mouth/Throat: Lips, mucosa, and tongue normal. Teeth and gums normal.   Neck: Supple, symmetrical, trachea midline, no adenopathy, thyroid: no enlargment/tenderness/nodules, no carotid bruit and no JVD. Back:   Symmetric, no curvature. ROM normal. No CVA tenderness. Lungs:   Clear to auscultation bilaterally. Heart:  Regular rate and rhythm, S1, S2 normal, no murmur, click, rub or gallop. Abdomen:   Soft, non-tender. Bowel sounds normal. No masses,  No organomegaly. Extremities:  Bedbound . Pulses: 2+ and symmetric all extremities. Skin: Skin color, texture, turgor normal. No rashes or lesions   Lymph nodes:  Bedbound   Neurologic:        Lab/Data Review: All lab results for the last 24 hours reviewed.   Recent Results (from the past 24 hour(s))   GLUCOSE, POC    Collection Time: 04/16/22  5:59 PM   Result Value Ref Range    Glucose (POC) 137 (H) 65 - 117 mg/dL    Performed by CECILIA Peterson Collection Time: 04/16/22  9:31 PM   Result Value Ref Range    Glucose (POC) 215 (H) 65 - 117 mg/dL    Performed by Mariely Montoya    GLUCOSE, POC    Collection Time: 04/17/22  5:17 AM   Result Value Ref Range    Glucose (POC) 234 (H) 65 - 117 mg/dL    Performed by Mariely Montoya    C REACTIVE PROTEIN, QT    Collection Time: 04/17/22  7:53 AM   Result Value Ref Range    C-Reactive protein 7.79 (H) 0.00 - 0.60 mg/dL   CBC WITH AUTOMATED DIFF    Collection Time: 04/17/22  7:53 AM   Result Value Ref Range    WBC 13.3 (H) 3.6 - 11.0 K/uL    RBC 3.30 (L) 3.80 - 5.20 M/uL    HGB 9.4 (L) 11.5 - 16.0 g/dL    HCT 30.6 (L) 35.0 - 47.0 %    MCV 92.7 80.0 - 99.0 FL    MCH 28.5 26.0 - 34.0 PG    MCHC 30.7 30.0 - 36.5 g/dL    RDW 16.4 (H) 11.5 - 14.5 %    PLATELET 487 091 - 531 K/uL    MPV 12.7 8.9 - 12.9 FL    NRBC 0.0 0.0  WBC    ABSOLUTE NRBC 0.00 0.00 - 0.01 K/uL    NEUTROPHILS 80 (H) 32 - 75 %    LYMPHOCYTES 13 12 - 49 %    MONOCYTES 4 (L) 5 - 13 %    EOSINOPHILS 2 0 - 7 %    BASOPHILS 0 0 - 1 %    IMMATURE GRANULOCYTES 1 (H) 0 - 0.5 %    ABS. NEUTROPHILS 10.7 (H) 1.8 - 8.0 K/UL    ABS. LYMPHOCYTES 1.7 0.8 - 3.5 K/UL    ABS. MONOCYTES 0.5 0.0 - 1.0 K/UL    ABS. EOSINOPHILS 0.2 0.0 - 0.4 K/UL    ABS. BASOPHILS 0.0 0.0 - 0.1 K/UL    ABS. IMM.  GRANS. 0.1 (H) 0.00 - 0.04 K/UL    DF AUTOMATED     PROCALCITONIN    Collection Time: 04/17/22  7:53 AM   Result Value Ref Range    Procalcitonin 1.20 (H) 0 ng/mL   GLUCOSE, POC    Collection Time: 04/17/22  7:53 AM   Result Value Ref Range    Glucose (POC) 232 (H) 65 - 117 mg/dL    Performed by Yordan Reeves    GLUCOSE, POC    Collection Time: 04/17/22 10:45 AM   Result Value Ref Range    Glucose (POC) 240 (H) 65 - 117 mg/dL    Performed by Yordan Reeves        Assessment:     Active Problems:    Hyperkalemia (4/4/2022)      UTI (urinary tract infection) (4/4/2022)      Sepsis ([facility] [address] (4/4/2022)      RICARDO (acute kidney injury) ([facility] [address] (4/4/2022)      AMS (altered mental status) (4/4/2022)        Plan:     Procalcitonin 1.20 discussed in detail with the daughters and the patient.   Continue with management    Signed By: Radha Richey MD     April 17, 2022

## 2022-04-17 NOTE — PROGRESS NOTES
Renal Daily Progress Note    Admit Date: 4/3/2022      Subjective:   No changes  On tube feeds with water flushes    Current Facility-Administered Medications   Medication Dose Route Frequency    fluconazole (DIFLUCAN) 200mg/100 mL IVPB (premix)  200 mg IntraVENous Q24H    gabapentin (NEURONTIN) capsule 300 mg  300 mg Oral BID    insulin glargine (LANTUS) injection 32 Units  32 Units SubCUTAneous BID    artificial saliva (MOUTH KOTE) 1 Spray  1 Spray Oral PRN    amLODIPine (NORVASC) tablet 5 mg  5 mg Oral DAILY    metoprolol tartrate (LOPRESSOR) tablet 50 mg  50 mg Oral BID    nitroglycerin (NITROBID) 2 % ointment 1 Inch  1 Inch Topical Q6H    hydrALAZINE (APRESOLINE) tablet 50 mg  50 mg Oral TID    dextrose 5% infusion  80 mL/hr IntraVENous CONTINUOUS    [Held by provider] furosemide (LASIX) injection 40 mg  40 mg IntraVENous DAILY    labetaloL (NORMODYNE;TRANDATE) 20 mg/4 mL (5 mg/mL) injection 20 mg  20 mg IntraVENous Q4H PRN    hydrALAZINE (APRESOLINE) 20 mg/mL injection 20 mg  20 mg IntraVENous Q6H PRN    0.9% sodium chloride infusion 250 mL  250 mL IntraVENous PRN    insulin lispro (HUMALOG) injection   SubCUTAneous Q6H    atorvastatin (LIPITOR) tablet 20 mg  20 mg Oral QHS    latanoprost (XALATAN) 0.005 % ophthalmic solution 1 Drop  1 Drop Right Eye QPM    aspirin delayed-release tablet 81 mg  81 mg Oral DAILY    brimonidine (ALPHAGAN) 0.2 % ophthalmic solution 1 Drop  1 Drop Both Eyes TID    glucose chewable tablet 16 g  4 Tablet Oral PRN    glucagon (GLUCAGEN) injection 1 mg  1 mg IntraMUSCular PRN    acetaminophen (TYLENOL) suppository 650 mg  650 mg Rectal Q6H PRN    polyethylene glycol (MIRALAX) packet 17 g  17 g Oral DAILY PRN    ondansetron (ZOFRAN ODT) tablet 4 mg  4 mg Oral Q8H PRN    Or    ondansetron (ZOFRAN) injection 4 mg  4 mg IntraVENous Q6H PRN    heparin (porcine) injection 5,000 Units  5,000 Units SubCUTAneous Q8H    piperacillin-tazobactam (ZOSYN) 3.375 g in 0.9% sodium chloride (MBP/ADV) 100 mL MBP  3.375 g IntraVENous Q8H    albuterol-ipratropium (DUO-NEB) 2.5 MG-0.5 MG/3 ML  3 mL Nebulization Q6H PRN    ferrous sulfate 300 mg (60 mg iron)/5 mL oral syrup 300 mg  300 mg Oral BID    acetaminophen (TYLENOL) solution 650 mg  650 mg Per NG tube Q6H PRN        Review of Systems    ROS   Obtainable  Objective:     Patient Vitals for the past 8 hrs:   BP Temp Pulse Resp SpO2   04/17/22 0838 (!) 160/70 98.8 °F (37.1 °C) 70 18 100 %   04/17/22 0506 137/64 99.6 °F (37.6 °C) 73 18 100 %     04/17 0701 - 04/17 1900  In: 220   Out: -   04/15 1901 - 04/17 0700  In: 2220   Out: 975 [Urine:775]    Physical Exam:   Physical Exam  Cardiovascular:      Rate and Rhythm: Regular rhythm. Pulmonary:      Breath sounds: Normal breath sounds. Abdominal:      General: Bowel sounds are normal.      Palpations: Abdomen is soft. Comments: On PEG feeding   Skin:     General: Skin is warm. Absent toes on the right foot  Tube feeds ongoing      XR ABD (KUB)   Final Result   Percutaneous gastrostomy tube tip in the gastric antrum. XR CHEST PORT   Final Result   No acute pulmonary process. XR CHEST PORT   Final Result   No significant change. XR CHEST PORT   Final Result      XR CHEST PORT   Final Result   No acute cardiopulmonary abnormality. .       XR CHEST PORT   Final Result      CT HEAD WO CONT   Final Result   Chronic changes which appear stable. No acute or active intracranial process. Chronic paranasal sinus disease         XR CHEST PORT   Final Result   No acute findings.            Data Review   Recent Labs     04/17/22  0753 04/16/22  0750 04/15/22  0728   WBC 13.3* 13.7* 15.9*   HGB 9.4* 9.7* 9.5*   HCT 30.6* 31.9* 31.6*    245 256     Recent Labs     04/16/22  0750 04/15/22  1104    147*   K 4.8 5.1   * 118*   CO2 28 27   * 284*   BUN 42* 47*   CREA 0.96 0.96   CA 8.0* 7.9*   ALB 1.5*  --    ALT 53  --      No components found for: [place]  No results for input(s): PH, PCO2, PO2, HCO3, FIO2 in the last 72 hours. No results for input(s): INR, INREXT, INREXT, INREXT in the last 72 hours. Assessment:           Active Problems:    Hyperkalemia (4/4/2022)      UTI (urinary tract infection) (4/4/2022)      Sepsis ([facility] [address] (4/4/2022)      RICARDO (acute kidney injury) ([facility] [address] (4/4/2022)      AMS (altered mental status) (4/4/2022)    Acute kidney injury on CKD. -Resolved    Hypernatremia-improving    Leukocytosis -improving. CVA    Pseudomonas aeruginosa urinary tract infection    Diabetes mellitus uncontrolled    Plan:   No new labs. Will stop D5w  Free water flushes to continue  Check renal panel.     Thank you for letting us follow

## 2022-04-18 NOTE — ANESTHESIA PREPROCEDURE EVALUATION
Relevant Problems   NEUROLOGY   (+) CVA (cerebral vascular accident) ([facility] [address]      CARDIOVASCULAR   (+) HTN (hypertension)   (+) PAD (peripheral artery disease) (HCC)      RENAL FAILURE   (+) RICARDO (acute kidney injury) ([facility] [address]      ENDOCRINE   (+) DM type 2 causing vascular disease (HCC)   (+) Type 2 diabetes mellitus with nephropathy (HCC)      HEMATOLOGY   (+) Acute osteomyelitis of ankle or foot (HCC)       Anesthetic History   No history of anesthetic complications            Review of Systems / Medical History  Patient summary reviewed, nursing notes reviewed and pertinent labs reviewed    Pulmonary  Within defined limits                 Neuro/Psych       CVA  TIA    Comments: Metabolic Encephalopathy  Peripheral Neuropathy Cardiovascular    Hypertension          Past MI, CAD, PAD and hyperlipidemia      Comments: 4/2022 TTE:    Interpretation Summary         Left Ventricle: Left ventricle size is normal. Normal wall thickness. Normal wall motion. Low normal left ventricular systolic function with a visually estimated EF of 50 - 55%.   Mitral Valve: Mild transvalvular regurgitation.   Tricuspid Valve: Moderate transvalvular regurgitation. GI/Hepatic/Renal       Hepatitis: type C      Pertinent negatives: Renal disease: Creatinine 1.49.    Endo/Other    Diabetes: type 2, using insulin    Blood dyscrasia (INR 1.4) and anemia     Other Findings   Comments: Procedure Information    Case: [id number] Anesthesia Start Date/Time: 04/18/22 1256  Procedures:        ESOPHAGOGASTRODUODENOSCOPY (EGD) (N/A Upper GI Region)       PERCUTANEOUS ENDOSCOPIC GASTROSTOMY TUBE INSERTION (N/A Upper GI Region)  Anesthesia type: MAC  Diagnosis: Dysphagia, unspecified type (R13.10)  Pre-op diagnosis: Dysphagia, unspecified type (R13.10)  Location: [facility] ENDO 42 / [facility] ENDOSCOPY  Providers: Alex Schmid MD             Physical Exam    Airway  Mallampati: III  TM Distance: 4 - 6 cm  Neck ROM: normal range of motion   Mouth opening: Normal     Cardiovascular    Rhythm: regular  Rate: normal         Dental    Dentition: Poor dentition     Pulmonary  Breath sounds clear to auscultation               Abdominal  GI exam deferred       Other Findings   Comments: Results for Yoan Roth (MRN [id number]) as of 4/18/2022 13:02    4/18/2022 08:43  Sodium: 145  Potassium: 4.6  Chloride: 115 (H)  CO2: 28  Anion gap: 2 (L)  Glucose: 69  BUN: 31 (H)  Creatinine: 0.71  BUN/Creatinine ratio: 44 (H)  Calcium: 8.1 (L)  Phosphorus: 3.2  GFR est non-AA: >60  GFR est AA: >60      Results for Yoan Roth (MRN [id number]) as of 4/18/2022 13:02    4/18/2022 08:43  WBC: 11.4 (H)  NRBC: 0.2 (H)  RBC: 3.46 (L)  HGB: 9.8 (L)  HCT: 31.7 (L)  MCV: 91.6  MCH: 28.3  MCHC: 30.9  RDW: 16.4 (H)  PLATELET: 805         Anesthetic Plan    ASA: 4  Anesthesia type: total IV anesthesia and MAC          Induction: Intravenous  Anesthetic plan and risks discussed with: Patient and Son / Daughter      Telephone Consent obtained from patient's daughter.

## 2022-04-18 NOTE — PROGRESS NOTES
Called Dr. Amaya Males and left message on voicemail regarding PEG tube being out and needing replaced

## 2022-04-18 NOTE — PROGRESS NOTES
Spoke with Dr. Larry Parmar  To find out how long to wait before we use PEG tube and he stated 6 hours. start using tube at 2030pm

## 2022-04-18 NOTE — PROGRESS NOTES
Chart reviewed. Patient is having her PEG tube replaced today. Per Dr. Alexia Favre note there is the possibility of a toe amputation. Clinical updates were provided to [facility]. They have not started auth yet as they were requesting therapy notes. Per PT/OT on 4/15 they attempted to work with patient and stated she was unable to participate and was a LTC patient at a nursing home. Patient was previously at [facility] but was there for skilled care not LTC. CM notified therapy team of this. Despite patient not being LTC if she is unable to participate with PT/OT the facility can attempt to obtain auth for her wound care and PEG tube. SHIRLEY reached out to the patient's daughter, Alex Montano, at [phone] and explained the possibility of insurance denial for SNF if patient is unable to participate in therapy. She stated in the event it is denied she would be okay with patient using her her LTC benefit. CM notified [facility] of this and they stated at this time they do not have any LTC beds. CM notified daughter to start thinking of other nursing facility choices as a back up plan. She acknowledged her understanding of this. DC plan: Once more stable [facility] will attempt to obtain auth for SNF. If denied, family agreeable to LTC at another facility (awaiting additional choices).

## 2022-04-18 NOTE — PROGRESS NOTES
Problem: Diabetes Self-Management  Goal: *Disease process and treatment process  Description: Define diabetes and identify own type of diabetes; list 3 options for treating diabetes. Outcome: Progressing Towards Goal  Goal: *Incorporating nutritional management into lifestyle  Description: Describe effect of type, amount and timing of food on blood glucose; list 3 methods for planning meals. Outcome: Progressing Towards Goal     Problem: Pressure Injury - Risk of  Goal: *Prevention of pressure injury  Description: Document Shakir Scale and appropriate interventions in the flowsheet. Outcome: Progressing Towards Goal  Note: Pressure Injury Interventions:  Sensory Interventions: Assess changes in LOC    Moisture Interventions: Absorbent underpads    Activity Interventions: Pressure redistribution bed/mattress(bed type)    Mobility Interventions: Pressure redistribution bed/mattress (bed type)    Nutrition Interventions: Document food/fluid/supplement intake    Friction and Shear Interventions: Apply protective barrier, creams and emollients,Foam dressings/transparent film/skin sealants                Problem: Falls - Risk of  Goal: *Absence of Falls  Description: Document Maryann Fall Risk and appropriate interventions in the flowsheet. Outcome: Progressing Towards Goal  Note: Fall Risk Interventions:  Mobility Interventions: Communicate number of staff needed for ambulation/transfer    Mentation Interventions: Bed/chair exit alarm    Medication Interventions: Bed/chair exit alarm    Elimination Interventions: Call light in reach,Bed/chair exit alarm              Problem: Impaired Skin Integrity/Pressure Injury Treatment  Goal: *Improvement of Existing Pressure Injury  Outcome: Progressing Towards Goal  Goal: *Prevention of pressure injury  Description: Document Shakir Scale and appropriate interventions in the flowsheet.   Outcome: Progressing Towards Goal  Note: Pressure Injury Interventions:  Sensory Interventions: Assess changes in LOC    Moisture Interventions: Absorbent underpads    Activity Interventions: Pressure redistribution bed/mattress(bed type)    Mobility Interventions: Pressure redistribution bed/mattress (bed type)    Nutrition Interventions: Document food/fluid/supplement intake    Friction and Shear Interventions: Apply protective barrier, creams and emollients,Foam dressings/transparent film/skin sealants

## 2022-04-18 NOTE — PROGRESS NOTES
Comprehensive Nutrition Assessment    Type and Reason for Visit: (P) Reassess    Nutrition Recommendations/Plan:   Continue NPO, PEG feeds as Glucerna 1.5 Mahad at goal rate 60 mL/hr, continuous. Flush with 220 mL water Q4H. Provides 2160 kcal(100%), 119 gm PRO(105%), 2472 mL H20(102%).    D5 @ 100 mL/hr [408 kcal (106%). If BGs unimproved  w/ SSI change consider IVF w/o additional kcal.    Nutrition Assessment:  (P) Admitted for AMS 2/2 UTI. RD familiar with pt from last admit for CVA c/b by hematuria and gangrenous L foot. PEG in place, TF started (4/4). Hypertensive and tachypnic this AM, transferred to ICU. Per ICU RN, pt TF held all day d/t concern for aspiration pna aeb fever (102.2F Tmax) though CXR shows lungs are clear. Discussed restarting TF as able, no changes in regimen. Noted that TF not advanced past 50ml/hr while pt on medical unit, unsure why. (4/11) Pt in bed on medical floor s/p transfer from ICU today; TF to resume s/p meds administration per Nsg. Noted water flushes increased to 350 mL Q4H w/o sig. change in lytes and TF remains as Glucerna and SSI; consider Diabetes Insipidus vs dehydration. Observed on IVF(D5)? Will adjust flush and monitor lytes. (4/14)TF running as Gluc 1.5 at goal rate w/o report of intolerance. TF continues to meet >80% of EENs. Consistent elevated BGs trended- D5 noted to be increased? Discussed w/ Nephro, SSI to be adjusted for BG management. RD rec's continuing regimen, manage BGs w/ SSI. (4/18) PEG noted to be displaced during care, plan to replace today. Labs: Na 145, K 4.6, BUN 31, Creat 0.71, Gluc 69, Alb 1.5. Meds: atorvastatin, D5, ferrous sulfate, insulin glargine, insulin lispro.     Malnutrition Assessment:  Malnutrition Status:  No malnutrition    Context:  Chronic illness       Estimated Daily Nutrient Needs:  Energy (kcal): (P) 9666-3963QCXVP (25-30kcals/kg consider bedbound 2/2 CVA and wounds); Weight Used for Energy Requirements: (P) Current  Protein (g): (P) 97-113g (1.2-1.4g/kg for wounds); Weight Used for Protein Requirements: (P) Current  Fluid (ml/day): (P) 1154-4916HO; Method Used for Fluid Requirements: (P) 1 ml/kcal    Nutrition Related Findings:  (P) No n/v/d/c. No edema. Last BM 4/17. Wounds:    (P) Deep tissue injury,Unstageable,Moisture associate skin damage,Diabetic ulcer       Current Nutrition Therapies:  DIET NPO  ADULT TUBE FEEDING PEG; Diabetic; Delivery Method: Continuous; Continuous Initial Rate (mL/hr): 60; Continuous Advance Tube Feeding: No; Water Flush Volume (mL): 220; Water Flush Frequency: Q 4 hours    Anthropometric Measures:  · Height:  (P) 5' 7.99\" (172.7 cm)  · Current Body Wt:  (P) 86.6 kg (190 lb 14.7 oz)   · Admission Body Wt:  178 lb 5.6 oz (4/4)    · Usual Body Wt:   (elkin)     · Ideal Body Wt:  (P) 140 lbs:  (P) 136.4 %   · BMI Category:  (P) Overweight (BMI 25.0-29. 9)       Nutrition Diagnosis:   · (P) Biting/chewing (masticatory) difficulty,Swallowing difficulty related to (P) cognitive or neurological impairment (CVA) as evidenced by (P) nutrition support-enteral nutrition    Nutrition Interventions:   Food and/or Nutrient Delivery: (P) Continue NPO,Continue tube feeding,IV fluid delivery  Nutrition Education and Counseling: (P) No recommendations at this time  Coordination of Nutrition Care: (P) Continue to monitor while inpatient    Goals:  (P) Meet >/=75% of EENs in >5 days, Wt maintenance within +/-0.5kg in >5 days, Signs of wound healing per nsg assessment in >5 days       Nutrition Monitoring and Evaluation:   Behavioral-Environmental Outcomes: (P) None identified  Food/Nutrient Intake Outcomes: (P) Enteral nutrition intake/tolerance,IVF intake  Physical Signs/Symptoms Outcomes: (P) Biochemical data,Hemodynamic status,Weight,GI status,Fluid status or edema    Discharge Planning:    (P) Too soon to determine     Electronically signed by Kyle Vazquez RD on 4/18/2022 at 2:49 PM    Contact: [phone]

## 2022-04-18 NOTE — PROGRESS NOTES
PROGRESS NOTE      Chief Complaints:  Patient examined this morning. HPI and  Objective:    Patient is nonverbal.  Patient had temperature 99 Fahrenheit. I examined the patient's left foot. Gangrene has extended to metatarsal bone at the great toe and also second toe looks gangrenous as well. Review of Systems:  Unable to assess due to mental status  EXAM:  Visit Vitals  BP (!) 114/52 (BP 1 Location: Left lower arm, BP Patient Position: At rest;Lying left side)   Pulse 66   Temp 99 °F (37.2 °C)   Resp 20   Ht 5' 8\" (1.727 m)   Wt 191 lb (86.6 kg)   SpO2 100%   BMI 29.04 kg/m²       Patient is awake   Head and neck atraumatic, normocephalic. ENT: No hoarse voice  Cardiac system regular rate rhythm. Pulmonary no audible wheeze  Chest wall excursion normal with respiration cycle  Abdomen is soft not particularly distended. Neurologically nonfocal.  Skin is warm and moist.  Psychosocial:  Vascular examination as previously noted no changes. Recent Results (from the past 24 hour(s))   C REACTIVE PROTEIN, QT    Collection Time: 04/17/22  7:53 AM   Result Value Ref Range    C-Reactive protein 7.79 (H) 0.00 - 0.60 mg/dL   CBC WITH AUTOMATED DIFF    Collection Time: 04/17/22  7:53 AM   Result Value Ref Range    WBC 13.3 (H) 3.6 - 11.0 K/uL    RBC 3.30 (L) 3.80 - 5.20 M/uL    HGB 9.4 (L) 11.5 - 16.0 g/dL    HCT 30.6 (L) 35.0 - 47.0 %    MCV 92.7 80.0 - 99.0 FL    MCH 28.5 26.0 - 34.0 PG    MCHC 30.7 30.0 - 36.5 g/dL    RDW 16.4 (H) 11.5 - 14.5 %    PLATELET 578 917 - 850 K/uL    MPV 12.7 8.9 - 12.9 FL    NRBC 0.0 0.0  WBC    ABSOLUTE NRBC 0.00 0.00 - 0.01 K/uL    NEUTROPHILS 80 (H) 32 - 75 %    LYMPHOCYTES 13 12 - 49 %    MONOCYTES 4 (L) 5 - 13 %    EOSINOPHILS 2 0 - 7 %    BASOPHILS 0 0 - 1 %    IMMATURE GRANULOCYTES 1 (H) 0 - 0.5 %    ABS. NEUTROPHILS 10.7 (H) 1.8 - 8.0 K/UL    ABS. LYMPHOCYTES 1.7 0.8 - 3.5 K/UL    ABS. MONOCYTES 0.5 0.0 - 1.0 K/UL    ABS. EOSINOPHILS 0.2 0.0 - 0.4 K/UL    ABS.  BASOPHILS 0.0 0.0 - 0.1 K/UL    ABS. IMM. GRANS. 0.1 (H) 0.00 - 0.04 K/UL    DF AUTOMATED     PROCALCITONIN    Collection Time: 04/17/22  7:53 AM   Result Value Ref Range    Procalcitonin 1.20 (H) 0 ng/mL   GLUCOSE, POC    Collection Time: 04/17/22  7:53 AM   Result Value Ref Range    Glucose (POC) 232 (H) 65 - 117 mg/dL    Performed by [employer] [address], POC    Collection Time: 04/17/22 10:45 AM   Result Value Ref Range    Glucose (POC) 240 (H) 65 - 117 mg/dL    Performed by [employer] [place], POC    Collection Time: 04/17/22  4:36 PM   Result Value Ref Range    Glucose (POC) 191 (H) 65 - 117 mg/dL    Performed by Genevia Collet, POC    Collection Time: 04/17/22  6:13 PM   Result Value Ref Range    Glucose (POC) 196 (H) 65 - 117 mg/dL    Performed by Genevia Collet, POC    Collection Time: 04/17/22  9:35 PM   Result Value Ref Range    Glucose (POC) 186 (H) 65 - 117 mg/dL    Performed by Ismael Bernal    GLUCOSE, POC    Collection Time: 04/18/22  6:02 AM   Result Value Ref Range    Glucose (POC) 108 65 - 117 mg/dL    Performed by Ismael Bernal        ASSESSMENT:   Patient is 78 y.o. with diagnosis of : Active Problems:    Hyperkalemia (4/4/2022)      UTI (urinary tract infection) (4/4/2022)      Sepsis ([facility] [address] (4/4/2022)      RICARDO (acute kidney injury) ([facility] [address] (4/4/2022)      AMS (altered mental status) (4/4/2022)        PLAN:                 I contacted patient's daughter last week and left a message about possibly doing toe amputation on the left foot. And I have not heard from her yet. I will try to contact him again today. Once patient clinically more stable perform second toe and great toe amputation on the left foot. I will follow.

## 2022-04-18 NOTE — PROGRESS NOTES
PROGRESS NOTE    Patient: Malinda Rosado MRN: [id number]  SSN: [id number]    YOB: 1945  Age: 78 y.o. Sex: female      Admit Date: 4/3/2022    LOS: 14 days       Subjective     Chief Complaint   Patient presents with    Altered mental status       HPI: Patient is a 78y.o. 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 04/05   Patient is seen at bedside with daughter and nephew today. Patient is in distress due to pain and daughter notes that patient gets Neurontin TID for neuropathy daily. Otherwise, patient is still receiving enteric feeding through PEG tube and getting IVF. Patient was seen by nephrology yesterday. Recommends obtaining renal panel and continuing IVF. Patient was seen by ID yesterday. Recommends continuing IV vancomycin for urosepsis. Patient was seen by pulmonology today. Recommends PRBC transfusion. Labs indicate WBC 16.9, Hgb 6.3, Na 148, Cl 120, BUN 72, Cr 1.71, Ca 8, CRP 28.4, pro-carrie 6.93 and .       04/06   Patient is seen at bedside. Patient received 2 units of PRBC. Patient is on Zosyn. No new changes today. Patient seen by the vascular surgeon he recommend great toe amputation  And for sacral wound recommend Medihoney ointment     Labs show increasing Hgb of 9, decreasing WBC 14.7, Na 147, , BUN 55,   Cr 1.47, and CRP 21.4. CXR is unremarkable. 04/07  Patient is seen resting at bedside.  Patient received one unit of PRBC with increased Hgb of 10.6. No acute changes today. Patient is seen by nephrology who recommends free water flushes to correct hypernatremia. Labs show increasing Hgb of 10.6, WBC 15.1, pro-calcitonin 3.78, and CRP 16.1.    4/8    Patient had a rapid response this morning ABG and chest x-ray was ordered  Patient tachycardic tachypneic  ABG  pH 7.51 PCO2 31 PO2 65 bicarb 26 oxygen saturation 95% on room air    Chest x-ray shows no much changes    4/9    Patient resting the bed open eyes  On room air/breathing through the mouth    4/10    Patient open eyes not in distress breathing through her mouth  According to the nurse patient not sleep all day and all night yesterday      4/11  Patient is seen at bedside. She is awake but not in any distress. Patient is on zosyn. PEG tube in place. Urine culture positive for Pseudomonas. CXR shows no focal changes. Labs remarkable for WBC  22.3, Hgb 10.5, Na 153, , Cr 1.55 BUN 94, pro-calcitonin 1.64, and CRP 3.33.    4/12  Patient is on Vancomycin, zosyn and D5W. PEG tube in place. Spoke to daughter at bedside. She has no new complaints. Labs remarkable for:  WBC  18.9,  Cr 1.33, BUN 84,  and CRP 2.33 which are decreasing. Hgb 11.2, Na-corrected 158, , and pro-calcitonin 1.93 which are increasing. Seen by pulmonology. No new recommendations. Seen by GI. No new recommendations. Seen by nephrology. recommends IV D5W IVF to decrease hypernatremia. Seen by ID. Recommends discontinuing fluconazole, follow up blood cultures, and continuing IV zosyn and vancomycin. 4/13/2022  Patient is on Vancomycin, zosyn and D5W. PEG tube in place. Nephrology - recommends IV D5W IVF to decrease hypernatremia. Increase glargine to 25 units twice daily to correct hyperglycemia.  Once the D5W is discontinued, the dose of glargine will have to be decreased    ID - Continue IV Zosyn and Vancomycin; discontinue Vancomycin if WBC continues to decrease    Pulmonology - No new recommendations. GI - No new recommendations. CXR 4/11/2022 - No acute pulmonary process (no focal changes)     Labs remarkable for:  Blood Glucose 380  WBC  19.8  Hgb 10.3   Na-corrected 154  BUN 69  Cr 1.22  CRP 3.14  pro-calcitonin 2.28     4/14  Patient's family members are present at bedside with the patient. They notes that the patient was in distress this morning. Patient still on vancomycin and zosyn. PEG tube in place and patient is receiving enteral feedings. Seen by vascular surgeon who recommends great toe amputation. Labs remarkable for Na-corrected 154 improving, Cr 1.19 improving, pro-carrie 2.42,  and CRP 7.65.    4/15  Patient is seen grunting and restless at bedside. Daughter is at bedside. She has no new complaints. UA on 4/14 still positive for WBC>100, leukocyte esterase and grew yeast.  Labs remarkable for WBC 15.9 decreasing, Hb 9.5, Na 145, and CRP 7.99. Patient seen by ID. Recommends continuing zosyn and discontinuing vancomycin. 4/18  Patient is seen at bedside. She is awake and alert x3. She is able to say a few words this morning. She has no new complaints. She is on zosyn and fluconazole. PEG tube is clogged. Seen by vascular surgeon. Recommends great toe and second toe amputation. Awaiting for consent from daughter. Labs remarkable for WBC 11.4 decreasing, Hb 9.8, Na-corrected 154, and pro-calcitonin 2.01. Review of Systems   Unable to perform ROS: Acuity of condition   All other systems reviewed and are negative. Unable to obtain.      Objective     Visit Vitals  BP (!) 114/52 (BP 1 Location: Left lower arm, BP Patient Position: At rest;Lying left side)   Pulse 66   Temp 99 °F (37.2 °C)   Resp 20   Ht 5' 8\" (1.727 m)   Wt 86.6 kg (191 lb)   SpO2 100%   BMI 29.04 kg/m²    O2 Flow Rate (L/min): 0 l/min O2 Device: None (Room air)    Physical Exam:   Physical Exam  Constitutional: Open eyes do not follow any commands. Tracks with eyes. HENT:   Head: Normocephalic and atraumatic. Eyes: Pupils are equal, round, and reactive to light. EOM are normal.   Cardiovascular: Normal rate, regular rhythm and normal heart sounds. Pulmonary/Chest: Decreased breath sound   abdominal: Soft. Bowel sounds are normal. There is no abdominal tenderness. There is no rebound and no guarding. Musculoskeletal: Normal range of motion. Neurological: Open eyes do not follow any, right-sided weakness  PEG tube in place. Intake & Output:  Current Shift: 04/18 0701 - 04/18 1900  In: -   Out: 2000 [Urine:2000]  Last three shifts: 04/16 1901 - 04/18 0700  In: 220   Out: 400 [Urine:400]    Lab/Data Review:  Lab results reviewed. For significant abnormal values and values requiring intervention, see assessment and plan.        24 Hour Results:    Recent Results (from the past 24 hour(s))   GLUCOSE, POC    Collection Time: 04/17/22  4:36 PM   Result Value Ref Range    Glucose (POC) 191 (H) 65 - 117 mg/dL    Performed by Raquel Hernandez, POC    Collection Time: 04/17/22  6:13 PM   Result Value Ref Range    Glucose (POC) 196 (H) 65 - 117 mg/dL    Performed by Raquel Hernandez, POC    Collection Time: 04/17/22  9:35 PM   Result Value Ref Range    Glucose (POC) 186 (H) 65 - 117 mg/dL    Performed by [facility]    GLUCOSE, POC    Collection Time: 04/18/22  6:02 AM   Result Value Ref Range    Glucose (POC) 108 65 - 117 mg/dL    Performed by [facility]    RENAL FUNCTION PANEL    Collection Time: 04/18/22  8:43 AM   Result Value Ref Range    Sodium 145 136 - 145 mmol/L    Potassium 4.6 3.5 - 5.1 mmol/L    Chloride 115 (H) 97 - 108 mmol/L    CO2 28 21 - 32 mmol/L    Anion gap 2 (L) 5 - 15 mmol/L    Glucose 69 65 - 100 mg/dL    BUN 31 (H) 6 - 20 mg/dL    Creatinine 0.71 0.55 - 1.02 mg/dL    BUN/Creatinine ratio 44 (H) 12 - 20      GFR est AA >60 >60 ml/min/1.73m2    GFR est non-AA >60 >60 ml/min/1.73m2 Calcium 8.1 (L) 8.5 - 10.1 mg/dL    Phosphorus 3.2 2.6 - 4.7 mg/dL    Albumin 1.5 (L) 3.5 - 5.0 g/dL   CBC WITH AUTOMATED DIFF    Collection Time: 04/18/22  8:43 AM   Result Value Ref Range    WBC 11.4 (H) 3.6 - 11.0 K/uL    RBC 3.46 (L) 3.80 - 5.20 M/uL    HGB 9.8 (L) 11.5 - 16.0 g/dL    HCT 31.7 (L) 35.0 - 47.0 %    MCV 91.6 80.0 - 99.0 FL    MCH 28.3 26.0 - 34.0 PG    MCHC 30.9 30.0 - 36.5 g/dL    RDW 16.4 (H) 11.5 - 14.5 %    PLATELET 988 274 - 531 K/uL    MPV 12.9 8.9 - 12.9 FL    NRBC 0.2 (H) 0.0  WBC    ABSOLUTE NRBC 0.02 (H) 0.00 - 0.01 K/uL    NEUTROPHILS 76 (H) 32 - 75 %    LYMPHOCYTES 17 12 - 49 %    MONOCYTES 5 5 - 13 %    EOSINOPHILS 1 0 - 7 %    BASOPHILS 0 0 - 1 %    IMMATURE GRANULOCYTES 1 (H) 0 - 0.5 %    ABS. NEUTROPHILS 8.6 (H) 1.8 - 8.0 K/UL    ABS. LYMPHOCYTES 2.0 0.8 - 3.5 K/UL    ABS. MONOCYTES 0.5 0.0 - 1.0 K/UL    ABS. EOSINOPHILS 0.2 0.0 - 0.4 K/UL    ABS. BASOPHILS 0.0 0.0 - 0.1 K/UL    ABS. IMM. GRANS. 0.1 (H) 0.00 - 0.04 K/UL    DF AUTOMATED     C REACTIVE PROTEIN, QT    Collection Time: 04/18/22  8:43 AM   Result Value Ref Range    C-Reactive protein 7.99 (H) 0.00 - 0.60 mg/dL   PROCALCITONIN    Collection Time: 04/18/22  8:43 AM   Result Value Ref Range    Procalcitonin 1.34 (H) 0 ng/mL   GLUCOSE, POC    Collection Time: 04/18/22 11:40 AM   Result Value Ref Range    Glucose (POC) 59 (L) 65 - 117 mg/dL    Performed by Dash Fernandez          Imaging:    XR ABD (KUB)   Final Result   Percutaneous gastrostomy tube tip in the gastric antrum. XR CHEST PORT   Final Result   No acute pulmonary process. XR CHEST PORT   Final Result   No significant change. XR CHEST PORT   Final Result      XR CHEST PORT   Final Result   No acute cardiopulmonary abnormality. .       XR CHEST PORT   Final Result      CT HEAD WO CONT   Final Result   Chronic changes which appear stable. No acute or active intracranial process.    Chronic paranasal sinus disease         XR CHEST PORT Final Result   No acute findings. Assessment   Severe sepsis with UTI  UTI/Pseudomonas  Acute on chronic kidney disease  Hyperkalemia  Hypernatremia, improving  CVA with right-sided weakness  Anemia of chronic disease  Gangrene of the left great toe  Sacral decubitus ulcer  Acute respiratory failure  Possible aspiration  Elevated BNP  PEG tube malfunction/seen by the GI now working    Plan     Amlodipine 5 mg daily  Aspirin 81 mg daily  Lipitor 20 daily  Ferrous sulfate 300 twice a day  Gabapentin 300 mg once in the morning and at bedtime  Heparin 5000 units subcu every 8 hours  Hydralazine 50 mg 3 times a day  Lantus 22 units subcu twice a day  Metoprolol 50 mg twice a day  IV Zosyn 3.375 IV every 8 hours  Discontinue Vancomycin  Flucanazole 100 mg daily  Monitor renal function and sodium level.   Pulmonary nephrology infectious disease and vascular surgical.  Schedule for amputation of the left great toe  Start back on gabapentin at night only  Blood sugars running high secondary D5 at 75 cc/h as per nephrology    Replace PEG tube today  Discussed with the patient's daughter at the bedside      Current Facility-Administered Medications:     dextrose 5% infusion, 100 mL/hr, IntraVENous, CONTINUOUS, Gloria Louise MD    fluconazole (DIFLUCAN) 200mg/100 mL IVPB (premix), 200 mg, IntraVENous, Q24H, Norberto Lyle MD, Last Rate: 100 mL/hr at 04/17/22 1410, 200 mg at 04/17/22 1410    gabapentin (NEURONTIN) capsule 300 mg, 300 mg, Oral, BID, Israel Soto MD, 300 mg at 04/17/22 0843    insulin glargine (LANTUS) injection 32 Units, 32 Units, SubCUTAneous, BID, Israel Soto MD, 32 Units at 04/17/22 2250    artificial saliva (MOUTH KOTE) 1 Spray, 1 Spray, Oral, PRN, Israel Soto MD, 1 Spray at 04/15/22 1053    amLODIPine (NORVASC) tablet 5 mg, 5 mg, Oral, DAILY, Israel Soto MD, 5 mg at 04/17/22 0843    metoprolol tartrate (LOPRESSOR) tablet 50 mg, 50 mg, Oral, BID, Brittany Pricila Jama MD, 50 mg at 04/17/22 0842    nitroglycerin (NITROBID) 2 % ointment 1 Inch, 1 Inch, Topical, Q6H, Marco Zapata MD, 1 Inch at 04/18/22 0554    hydrALAZINE (APRESOLINE) tablet 50 mg, 50 mg, Oral, TID, Pricila Soto MD, 50 mg at 04/17/22 1642    [Held by provider] furosemide (LASIX) injection 40 mg, 40 mg, IntraVENous, DAILY, Israel Soto MD, 40 mg at 04/09/22 1054    labetaloL (NORMODYNE;TRANDATE) 20 mg/4 mL (5 mg/mL) injection 20 mg, 20 mg, IntraVENous, Q4H PRN, Israel Soto MD, 20 mg at 04/15/22 0255    hydrALAZINE (APRESOLINE) 20 mg/mL injection 20 mg, 20 mg, IntraVENous, Q6H PRN, Israel Soto MD, 20 mg at 04/16/22 0559    0.9% sodium chloride infusion 250 mL, 250 mL, IntraVENous, PRN, Martínez Crespo MD    insulin lispro (HUMALOG) injection, , SubCUTAneous, Q6H, Israel Soto MD, 3 Units at 04/17/22 1815    atorvastatin (LIPITOR) tablet 20 mg, 20 mg, Oral, QHS, Israel Soto MD, 20 mg at 04/16/22 2128    latanoprost (XALATAN) 0.005 % ophthalmic solution 1 Drop, 1 Drop, Right Eye, QPM, Israel Soto MD, 1 Drop at 04/17/22 1804    aspirin delayed-release tablet 81 mg, 81 mg, Oral, DAILY, Israel Soto MD, 81 mg at 04/17/22 0843    brimonidine (ALPHAGAN) 0.2 % ophthalmic solution 1 Drop, 1 Drop, Both Eyes, TID, Israel Soto MD, 1 Drop at 04/18/22 1026    glucose chewable tablet 16 g, 4 Tablet, Oral, PRN, Pricila Soto MD    glucagon (GLUCAGEN) injection 1 mg, 1 mg, IntraMUSCular, PRN, Israel Soto MD, 1 mg at 04/18/22 1145    [DISCONTINUED] acetaminophen (TYLENOL) tablet 650 mg, 650 mg, Oral, Q6H PRN, 650 mg at 04/04/22 2246 **OR** acetaminophen (TYLENOL) suppository 650 mg, 650 mg, Rectal, Q6H PRN, Pricila Soto MD    polyethylene glycol (MIRALAX) packet 17 g, 17 g, Oral, DAILY PRN, Israel Soto MD    ondansetron (ZOFRAN ODT) tablet 4 mg, 4 mg, Oral, Q8H PRN **OR** ondansetron (ZOFRAN) injection 4 mg, 4 mg, IntraVENous, Q6H PRN, Vickie Fajardo MD    heparin (porcine) injection 5,000 Units, 5,000 Units, SubCUTAneous, Q8H, Israel Soto MD, 5,000 Units at 04/18/22 0044    piperacillin-tazobactam (ZOSYN) 3.375 g in 0.9% sodium chloride (MBP/ADV) 100 mL MBP, 3.375 g, IntraVENous, Q8H, Israel Soto MD, Last Rate: 25 mL/hr at 04/18/22 1015, 3.375 g at 04/18/22 1015    albuterol-ipratropium (DUO-NEB) 2.5 MG-0.5 MG/3 ML, 3 mL, Nebulization, Q6H PRN, Delano Soto MD    ferrous sulfate 300 mg (60 mg iron)/5 mL oral syrup 300 mg, 300 mg, Oral, BID, Delano Soto MD, 300 mg at 04/17/22 9349    acetaminophen (TYLENOL) solution 650 mg, 650 mg, Per NG tube, Q6H PRN, Israel Soto MD, 650 mg at 04/16/22 1958    Current Outpatient Medications   Medication Instructions    acidophilus-pectin, citrus 25 million cell -100 mg tab tablet 2 Tablets, Oral, DAILY    amLODIPine (NORVASC) 5 mg, Oral, DAILY    artificial saliva (MOUTH KOTE) spra 1 Spray, Oral, 3 TIMES DAILY    aspirin delayed-release 81 mg tablet Oral, DAILY    atorvastatin (LIPITOR) 20 mg tablet TAKE 1 TABLET BY MOUTH EVERY NIGHT    brimonidine (ALPHAGAN) 0.2 % ophthalmic solution 1 Drop, Both Eyes, 3 TIMES DAILY    ferrous sulfate 325 mg, Oral, 2 TIMES DAILY    gabapentin (NEURONTIN) 300 mg, Oral, 2 TIMES DAILY    insulin glargine (LANTUS) 50 Units, SubCUTAneous, DAILY    latanoprost (XALATAN) 0.005 % ophthalmic solution 1 Drop, Right Eye, EVERY EVENING    lidocaine (XYLOCAINE) 4 % (40 mg/mL) topical solution 1 mL, Topical, AS NEEDED    metoprolol succinate (TOPROL-XL) 50 mg, Oral, 2 TIMES DAILY         Signed By: Sarahi Nunez MD     April 18, 2022

## 2022-04-18 NOTE — PROGRESS NOTES
PROGRESS NOTE    Patient: Lavell Wu MRN: [id number]  SSN: [id number]    YOB: 1945  Age: 78 y.o. Sex: female      Admit Date: 4/3/2022    LOS: 14 days       Subjective     Chief Complaint   Patient presents with    Altered mental status       HPI: Patient is a 78y.o. 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 04/05   Patient is seen at bedside with daughter and nephew today. Patient is in distress due to pain and daughter notes that patient gets Neurontin TID for neuropathy daily. Otherwise, patient is still receiving enteric feeding through PEG tube and getting IVF. Patient was seen by nephrology yesterday. Recommends obtaining renal panel and continuing IVF. Patient was seen by ID yesterday. Recommends continuing IV vancomycin for urosepsis. Patient was seen by pulmonology today. Recommends PRBC transfusion. Labs indicate WBC 16.9, Hgb 6.3, Na 148, Cl 120, BUN 72, Cr 1.71, Ca 8, CRP 28.4, pro-carrie 6.93 and .       04/06   Patient is seen at bedside. Patient received 2 units of PRBC. Patient is on Zosyn. No new changes today. Patient seen by the vascular surgeon he recommend great toe amputation  And for sacral wound recommend Medihoney ointment     Labs show increasing Hgb of 9, decreasing WBC 14.7, Na 147, , BUN 55,   Cr 1.47, and CRP 21.4. CXR is unremarkable. 04/07  Patient is seen resting at bedside.  Patient received one unit of PRBC with increased Hgb of 10.6. No acute changes today. Patient is seen by nephrology who recommends free water flushes to correct hypernatremia. Labs show increasing Hgb of 10.6, WBC 15.1, pro-calcitonin 3.78, and CRP 16.1.    4/8    Patient had a rapid response this morning ABG and chest x-ray was ordered  Patient tachycardic tachypneic  ABG  pH 7.51 PCO2 31 PO2 65 bicarb 26 oxygen saturation 95% on room air    Chest x-ray shows no much changes    4/9    Patient resting the bed open eyes  On room air/breathing through the mouth    4/10    Patient open eyes not in distress breathing through her mouth  According to the nurse patient not sleep all day and all night yesterday      4/11  Patient is seen at bedside. She is awake but not in any distress. Patient is on zosyn. PEG tube in place. Urine culture positive for Pseudomonas. CXR shows no focal changes. Labs remarkable for WBC  22.3, Hgb 10.5, Na 153, , Cr 1.55 BUN 94, pro-calcitonin 1.64, and CRP 3.33.    4/12  Patient is on Vancomycin, zosyn and D5W. PEG tube in place. Spoke to daughter at bedside. She has no new complaints. Labs remarkable for:  WBC  18.9,  Cr 1.33, BUN 84,  and CRP 2.33 which are decreasing. Hgb 11.2, Na-corrected 158, , and pro-calcitonin 1.93 which are increasing. Seen by pulmonology. No new recommendations. Seen by GI. No new recommendations. Seen by nephrology. recommends IV D5W IVF to decrease hypernatremia. Seen by ID. Recommends discontinuing fluconazole, follow up blood cultures, and continuing IV zosyn and vancomycin. 4/13/2022  Patient is on Vancomycin, zosyn and D5W. PEG tube in place. Nephrology - recommends IV D5W IVF to decrease hypernatremia. Increase glargine to 25 units twice daily to correct hyperglycemia.  Once the D5W is discontinued, the dose of glargine will have to be decreased    ID - Continue IV Zosyn and Vancomycin; discontinue Vancomycin if WBC continues to decrease    Pulmonology - No new recommendations. GI - No new recommendations. CXR 4/11/2022 - No acute pulmonary process (no focal changes)     Labs remarkable for:  Blood Glucose 380  WBC  19.8  Hgb 10.3   Na-corrected 154  BUN 69  Cr 1.22  CRP 3.14  pro-calcitonin 2.28     4/14  Patient's family members are present at bedside with the patient. They notes that the patient was in distress this morning. Patient still on vancomycin and zosyn. PEG tube in place and patient is receiving enteral feedings. Seen by vascular surgeon who recommends great toe amputation. Labs remarkable for Na-corrected 154 improving, Cr 1.19 improving, pro-carrie 2.42,  and CRP 7.65.    4/15  Patient is seen grunting and restless at bedside. Daughter is at bedside. She has no new complaints. UA on 4/14 still positive for WBC>100, leukocyte esterase and grew yeast.  Labs remarkable for WBC 15.9 decreasing, Hb 9.5, Na 145, and CRP 7.99. Patient seen by ID. Recommends continuing zosyn and discontinuing vancomycin. 4/18  Patient is seen at bedside. She is awake and alert x3. She is able to say a few words this morning. She has no new complaints. She is on zosyn and fluconazole. PEG tube is clogged. Seen by vascular surgeon. Recommends great toe and second toe amputation. Awaiting for consent from daughter. Labs remarkable for WBC 11.4 decreasing, Hb 9.8, Na-corrected 154, and pro-calcitonin 2.01. Review of Systems   Unable to perform ROS: Acuity of condition   All other systems reviewed and are negative. Unable to obtain.      Objective     Visit Vitals  BP (!) 114/52 (BP 1 Location: Left lower arm, BP Patient Position: At rest;Lying left side)   Pulse 66   Temp 99 °F (37.2 °C)   Resp 20   Ht 5' 8\" (1.727 m)   Wt 191 lb (86.6 kg)   SpO2 100%   BMI 29.04 kg/m²    O2 Flow Rate (L/min): 0 l/min O2 Device: None (Room air)    Physical Exam:   Physical Exam  Constitutional: Open eyes do not follow any commands. Tracks with eyes. HENT:   Head: Normocephalic and atraumatic. Eyes: Pupils are equal, round, and reactive to light. EOM are normal.   Cardiovascular: Normal rate, regular rhythm and normal heart sounds. Pulmonary/Chest: Decreased breath sound   abdominal: Soft. Bowel sounds are normal. There is no abdominal tenderness. There is no rebound and no guarding. Musculoskeletal: Normal range of motion. Neurological: Open eyes do not follow any, right-sided weakness  PEG tube in place. Intake & Output:  Current Shift: 04/18 0701 - 04/18 1900  In: -   Out: 2000 [Urine:2000]  Last three shifts: 04/16 1901 - 04/18 0700  In: 220   Out: 400 [Urine:400]    Lab/Data Review:  Lab results reviewed. For significant abnormal values and values requiring intervention, see assessment and plan. 24 Hour Results:    Recent Results (from the past 24 hour(s))   GLUCOSE, POC    Collection Time: 04/17/22 10:45 AM   Result Value Ref Range    Glucose (POC) 240 (H) 65 - 117 mg/dL    Performed by [address] [place], [place] [address]ie [employer], POC    Collection Time: 04/17/22  4:36 PM   Result Value Ref Range    Glucose (POC) 191 (H) 65 - 117 mg/dL    Performed by Fawn Bence, POC    Collection Time: 04/17/22  6:13 PM   Result Value Ref Range    Glucose (POC) 196 (H) 65 - 117 mg/dL    Performed by Fawn Bence, POC    Collection Time: 04/17/22  9:35 PM   Result Value Ref Range    Glucose (POC) 186 (H) 65 - 117 mg/dL    Performed by LEAFERanil    GLUCOSE, POC    Collection Time: 04/18/22  6:02 AM   Result Value Ref Range    Glucose (POC) 108 65 - 117 mg/dL    Performed by [facility]          Imaging:    XR ABD (KUB)   Final Result   Percutaneous gastrostomy tube tip in the gastric antrum. XR CHEST PORT   Final Result   No acute pulmonary process. XR CHEST PORT   Final Result   No significant change.       XR CHEST PORT   Final Result      XR CHEST PORT   Final Result   No acute cardiopulmonary abnormality. .       XR CHEST PORT   Final Result      CT HEAD WO CONT   Final Result   Chronic changes which appear stable. No acute or active intracranial process. Chronic paranasal sinus disease         XR CHEST PORT   Final Result   No acute findings. Assessment   Severe sepsis with UTI  UTI/Pseudomonas  Acute on chronic kidney disease  Hyperkalemia  Hypernatremia, improving  CVA with right-sided weakness  Anemia of chronic disease  Gangrene of the left great toe  Sacral decubitus ulcer  Acute respiratory failure  Possible aspiration  Elevated BNP  PEG tube malfunction/seen by the GI now working    Plan     Amlodipine 5 mg daily  Aspirin 81 mg daily  Lipitor 20 daily  Ferrous sulfate 300 twice a day  Gabapentin 300 mg once in the morning and at bedtime  Heparin 5000 units subcu every 8 hours  Hydralazine 50 mg 3 times a day  Lantus 22 units subcu twice a day  Metoprolol 50 mg twice a day  IV Zosyn 3.375 IV every 8 hours  Discontinue Vancomycin  Flucanazole 100 mg daily  Monitor renal function and sodium level. Pulmonary nephrology infectious disease and vascular surgical.  Schedule for amputation of the left great toe  Start back on gabapentin at night only  Blood sugars running high secondary D5 at 75 cc/h as per nephrology  PEG tube clogged-GI consult.    Discussed with the patient's daughter at the bedside      Current Facility-Administered Medications:     fluconazole (DIFLUCAN) 200mg/100 mL IVPB (premix), 200 mg, IntraVENous, Q24H, Raissa Dutta MD, Last Rate: 100 mL/hr at 04/17/22 1410, 200 mg at 04/17/22 1410    gabapentin (NEURONTIN) capsule 300 mg, 300 mg, Oral, BID, Israel Soto MD, 300 mg at 04/17/22 0843    insulin glargine (LANTUS) injection 32 Units, 32 Units, SubCUTAneous, BID, Israel Soto MD, 32 Units at 04/17/22 2250    artificial saliva (MOUTH KOTE) 1 Spray, 1 Spray, Oral, PRN, Israel Soto MD, 1 Spray at 04/15/22 1053    amLODIPine (NORVASC) tablet 5 mg, 5 mg, Oral, DAILY, Israel Soto MD, 5 mg at 04/17/22 0843    metoprolol tartrate (LOPRESSOR) tablet 50 mg, 50 mg, Oral, BID, Israel Soto MD, 50 mg at 04/17/22 0842    nitroglycerin (NITROBID) 2 % ointment 1 Inch, 1 Inch, Topical, Q6H, Kandy Berry MD, 1 Inch at 04/18/22 0554    hydrALAZINE (APRESOLINE) tablet 50 mg, 50 mg, Oral, TID, Israel Soto MD, 50 mg at 04/17/22 1642    [Held by provider] furosemide (LASIX) injection 40 mg, 40 mg, IntraVENous, DAILY, Isarel Soto MD, 40 mg at 04/09/22 1054    labetaloL (NORMODYNE;TRANDATE) 20 mg/4 mL (5 mg/mL) injection 20 mg, 20 mg, IntraVENous, Q4H PRN, Israel Soto MD, 20 mg at 04/15/22 0255    hydrALAZINE (APRESOLINE) 20 mg/mL injection 20 mg, 20 mg, IntraVENous, Q6H PRN, Israel Soto MD, 20 mg at 04/16/22 0559    0.9% sodium chloride infusion 250 mL, 250 mL, IntraVENous, PRN, Martínez Crespo MD    insulin lispro (HUMALOG) injection, , SubCUTAneous, Q6H, Israel Soto MD, 3 Units at 04/17/22 1815    atorvastatin (LIPITOR) tablet 20 mg, 20 mg, Oral, QHS, Israel Soto MD, 20 mg at 04/16/22 2128    latanoprost (XALATAN) 0.005 % ophthalmic solution 1 Drop, 1 Drop, Right Eye, QPM, Israel Soto MD, 1 Drop at 04/17/22 1804    aspirin delayed-release tablet 81 mg, 81 mg, Oral, DAILY, Israel Soto MD, 81 mg at 04/17/22 0843    brimonidine (ALPHAGAN) 0.2 % ophthalmic solution 1 Drop, 1 Drop, Both Eyes, TID, Israel Soto MD, 1 Drop at 04/17/22 3481    glucose chewable tablet 16 g, 4 Tablet, Oral, PRN, Israel Soto MD    glucagon (GLUCAGEN) injection 1 mg, 1 mg, IntraMUSCular, PRN, Dima Soto MD  [facility]  [DISCONTINUED] acetaminophen (TYLENOL) tablet 650 mg, 650 mg, Oral, Q6H PRN, 650 mg at 04/04/22 2246 **OR** acetaminophen (TYLENOL) suppository 650 mg, 650 mg, Rectal, Q6H PRN, Israel Soto MD    polyethylene glycol (MIRALAX) packet 17 g, 17 g, Oral, DAILY PRN, Kehinde Schaefer MD    ondansetron (ZOFRAN ODT) tablet 4 mg, 4 mg, Oral, Q8H PRN **OR** ondansetron (ZOFRAN) injection 4 mg, 4 mg, IntraVENous, Q6H PRN, Leticia Soto MD    heparin (porcine) injection 5,000 Units, 5,000 Units, SubCUTAneous, Q8H, Israel Soto MD, 5,000 Units at 04/18/22 0044    piperacillin-tazobactam (ZOSYN) 3.375 g in 0.9% sodium chloride (MBP/ADV) 100 mL MBP, 3.375 g, IntraVENous, Q8H, Israel Soto MD, Last Rate: 25 mL/hr at 04/18/22 0321, 3.375 g at 04/18/22 0321    albuterol-ipratropium (DUO-NEB) 2.5 MG-0.5 MG/3 ML, 3 mL, Nebulization, Q6H PRN, Leticia Soto MD    ferrous sulfate 300 mg (60 mg iron)/5 mL oral syrup 300 mg, 300 mg, Oral, BID, Leticia Soto MD, 300 mg at 04/17/22 8420    acetaminophen (TYLENOL) solution 650 mg, 650 mg, Per NG tube, Q6H PRN, Israel Soto MD, 650 mg at 04/16/22 1958    Current Outpatient Medications   Medication Instructions    acidophilus-pectin, citrus 25 million cell -100 mg tab tablet 2 Tablets, Oral, DAILY    amLODIPine (NORVASC) 5 mg, Oral, DAILY    artificial saliva (MOUTH KOTE) spra 1 Spray, Oral, 3 TIMES DAILY    aspirin delayed-release 81 mg tablet Oral, DAILY    atorvastatin (LIPITOR) 20 mg tablet TAKE 1 TABLET BY MOUTH EVERY NIGHT    brimonidine (ALPHAGAN) 0.2 % ophthalmic solution 1 Drop, Both Eyes, 3 TIMES DAILY    ferrous sulfate 325 mg, Oral, 2 TIMES DAILY    gabapentin (NEURONTIN) 300 mg, Oral, 2 TIMES DAILY    insulin glargine (LANTUS) 50 Units, SubCUTAneous, DAILY    latanoprost (XALATAN) 0.005 % ophthalmic solution 1 Drop, Right Eye, EVERY EVENING    lidocaine (XYLOCAINE) 4 % (40 mg/mL) topical solution 1 mL, Topical, AS NEEDED    metoprolol succinate (TOPROL-XL) 50 mg, Oral, 2 TIMES DAILY         Signed By: Sara Carias     April 18, 2022

## 2022-04-18 NOTE — PROGRESS NOTES
Spoke with Dr. Alejandra Thibodeaux regarding placing another peg tube.  He states to keep patient npo and he will try to put another one in today

## 2022-04-18 NOTE — PROGRESS NOTES
PCT and Silvia Soriano, charge RN were repositioning pt when they noticed the pt's PEG tube came out and was laying on pt's stomach. This RN was advised and called MD and house supervisor, both instructed this RN to call ER to see if they could place PEG tube. Awaiting call back. Update:  Landy RAI charge RN called back, stated they are unable to place PEG tube this night.

## 2022-04-18 NOTE — ANESTHESIA POSTPROCEDURE EVALUATION
Procedure(s):  ESOPHAGOGASTRODUODENOSCOPY (EGD)  PERCUTANEOUS ENDOSCOPIC GASTROSTOMY TUBE INSERTION. total IV anesthesia, MAC    Anesthesia Post Evaluation      Multimodal analgesia: multimodal analgesia not used between 6 hours prior to anesthesia start to PACU discharge  Patient location during evaluation: bedside (Endoscopy suite)  Patient participation: complete - patient cannot participate  Level of consciousness: sleepy but conscious  Pain score: 0  Pain management: adequate  Airway patency: patent  Anesthetic complications: no  Cardiovascular status: acceptable  Respiratory status: acceptable and nasal cannula  Hydration status: acceptable  Comments: This patient remained on the stretcher. The patient was handed off to the endoscopy nursing team.  All questions regarding pre-, intra-, and postoperative care were answered. Post anesthesia nausea and vomiting:  none      INITIAL Post-op Vital signs: No vitals data found for the desired time range.

## 2022-04-18 NOTE — PROGRESS NOTES
Progress Note    Patient: Diego Sanders MRN: [id number]  SSN: [id number]    YOB: 1945  Age: 78 y.o. Sex: female      Admit Date: 4/3/2022    LOS: 14 days     Subjective:   Patient followed for sepsis with UTI secondary to Pseudomonas sensitive to Zosyn, which has been completed. Now Candida UTI on Fluconazole. Patient remains nonverbal but appeared to be resting comfortably. Daughter at bedside stated that she spoke a few words again earlier today. Objective:     Vitals:    04/18/22 1340 04/18/22 1443 04/18/22 1449 04/18/22 1553   BP: 125/61  139/65 (!) 115/57   Pulse: 72  69 74   Resp: 16 18 18   Temp:   98.7 °F (37.1 °C) 97.6 °F (36.4 °C)   SpO2: 100%  100% 97%   Weight:       Height:  5' 7.99\" (1.727 m)          Intake and Output:  Current Shift: 04/18 0701 - 04/18 1900  In: -   Out: 2000 [Urine:2000]  Last three shifts: 04/16 1901 - 04/18 0700  In: 220   Out: 400 [Urine:400]    Physical Exam:   Vitals and nursing note reviewed. Constitutional:       General: She is not in acute distress. Appearance: She is ill-appearing. HENT: eyes closed, Room Air   Cardiovascular:      Rate and Rhythm: Normal rate and regular rhythm. Heart sounds: No murmur heard. Pulmonary:      Effort: Pulmonary effort is normal.      Breath sounds: Normal breath sounds. Abdominal:      General: Bowel sounds are normal.      Palpations: Abdomen is soft. Tenderness: There is no abdominal tenderness. Comments: PEG tube in place, site unremarkable  Genitourinary:     Comments: Bell catheter with moderate urine sediment  Flexiseal with empty bag at this time  Musculoskeletal:      Right lower leg: No edema. Left lower leg: No edema. Comments: Left great toe with dry gangrene, dark and shrunken   Skin: Stage 2 sacral wound     Findings: No rash. Neurological:      Mental Status: She is alert.       Comments: Unable to assess   Psychiatric:      Comments: Unable to assess Lab/Data Review:     WBC 11,4300  UA with WBC >100, budding yeasts    Procal 1.34 <1.20 <1.55 <2.01 <2.42 <6.93  CRP 7.99 <7.79 <5.89 <6.55 3.44 <16.10 <21.40 <28.40    Blood cultures (4/3) No growth FINAL  Blood cultures (4/10)  No growth FINAL  Urine culture (4/7) >100,000 cfu/ml Pseudomonas aeruginosa   Urine culture (4/14) >100,000 cfu/ml mixed urogenital yasmine    CXR (4/11) No acute pulmonary process    Assessment:     Active Problems:    Hyperkalemia (4/4/2022)      UTI (urinary tract infection) (4/4/2022)      Sepsis ([facility] [address] (4/4/2022)      [facility] (4/4/2022)      AMS (altered mental status) (4/4/2022)    1. Sepsis with fever and leukocytosis, elevated procal and CRP, resolving  2. UTI with marked pyuria and bacteriuria, secondary to Pseudomonas aeruginosa, Day #14 IV Zosyn  3. Altered mentals status, multifactorial including sepsis  4. Uncontrolled diabetes mellitus with hyperglycemia  5. Chronic maxillary sinusitis  6. ASCVD with prior stroke  7. Hepatitis C antibody positive, HCV RNA negative  8. 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 Sacral wound, Stage 2, no evidence of gross infection  10. Acute hypoxic respiratory failure, resolved  11. Diarrhea, presumed secondary to tube feeding  12. Candida UTI, presumptive, with marked pyuria and yeasts, Day #3 IV Fluconazole    Comment:  WBC decreasing but procal and CRP increasing for reasons unknown. This is somewhat concerning. She remains afebrile. Plan:   1. Discontinue IV Zosyn after today  2. Continue Fluconazole, reasonable to transition to oral Fluconazole 200 mg daily for 11 more days  3.  In am, repeat CBC, procal and CRP        Signed By: Wally Cavanaugh MD     April 18, 2022

## 2022-04-19 NOTE — DISCHARGE SUMMARY
Discharge Summary       PATIENT ID: Nandini Morales  MRN: [id number]   YOB: 1945    DATE OF ADMISSION: 4/3/2022 10:36 PM    DATE OF DISCHARGE:   PRIMARY CARE PROVIDER: Jamari Sung MD     ATTENDING PHYSICIAN: Catrina Soto  DISCHARGING PROVIDER: Catrina Soto      CONSULTATIONS: IP CONSULT TO NEPHROLOGY  IP CONSULT TO INFECTIOUS DISEASES  IP CONSULT TO NEPHROLOGY  IP CONSULT TO NEPHROLOGY  IP CONSULT TO GASTROENTEROLOGY  IP CONSULT TO GASTROENTEROLOGY  IP CONSULT TO PULMONOLOGY  IP CONSULT TO GENERAL SURGERY    PROCEDURES/SURGERIES: Procedure(s):  ESOPHAGOGASTRODUODENOSCOPY (EGD)  PERCUTANEOUS ENDOSCOPIC GASTROSTOMY TUBE INSERTION    ADMITTING DIAGNOSES:    Patient Active Problem List    Diagnosis Date Noted    Hyperkalemia 04/04/2022    UTI (urinary tract infection) 04/04/2022    Sepsis ([address] 04/04/2022    RICARDO (acute kidney injury) ([address] 04/04/2022    AMS (altered mental status) 04/04/2022    CVA (cerebral vascular accident) ([address] 02/21/2022    Elevated troponin 02/21/2022    Ischemia of both lower extremities 07/22/2020    Pain in both lower legs 07/22/2020    Acute osteomyelitis of ankle or foot ([address] 04/04/2019    Diabetic peripheral neuropathy ([address] 04/04/2019    Spinal stenosis in cervical region 04/04/2019    Type 2 diabetes mellitus with nephropathy ([address] 01/31/2018    DM type 2 causing vascular disease ([address] 07/27/2017    Hypercalcemia 12/12/2016    PAD (peripheral artery disease) ([address] 04/11/2016    Infection of nailbed of toe of left foot 09/10/2015    Diabetes mellitus with neurological manifestations, uncontrolled 08/08/2013    HTN (hypertension)     Hyperlipidemia     Neuropathy     Sciatica        DISCHARGE DIAGNOSES / PLAN:      Severe sepsis with UTI  UTI/Pseudomonas  Acute on chronic kidney disease  Hyperkalemia  Hypernatremia, improving  CVA with right-sided weakness  Anemia of chronic disease  Gangrene of the left great toe  Sacral decubitus ulcer  Acute respiratory failure  Possible aspiration  Elevated BNP  PEG tube malfunction/seen by the GI now working         DISCHARGE MEDICATIONS:  Current Discharge Medication List      START taking these medications    Details   albuterol-ipratropium (DUO-NEB) 2.5 mg-0.5 mg/3 ml nebu 3 mL by Nebulization route every six (6) hours as needed for Wheezing or Shortness of Breath. Qty: 30 Nebule, Refills: 1  Start date: 4/19/2022      hydrALAZINE (APRESOLINE) 50 mg tablet Take 1 Tablet by mouth three (3) times daily. Qty: 60 Tablet, Refills: 0  Start date: 4/19/2022      metoprolol tartrate (LOPRESSOR) 50 mg tablet Take 1 Tablet by mouth two (2) times a day. Qty: 60 Tablet, Refills: 0  Start date: 4/19/2022      fluconazole (DIFLUCAN) 100 mg tablet Take 2 Tablets by mouth daily for 7 days. Fluconazole 200 mg daily for 11 days  Qty: 11 Tablet, Refills: 0  Start date: 4/19/2022, End date: 4/26/2022         CONTINUE these medications which have NOT CHANGED    Details   gabapentin (NEURONTIN) 300 mg capsule Take 1 Capsule by mouth two (2) times a day. Max Daily Amount: 600 mg. Qty: 12 Capsule, Refills: 0    Associated Diagnoses: Pain      insulin glargine (LANTUS) 100 unit/mL injection 50 Units by SubCUTAneous route daily. Qty: 10 mL, Refills: 0      acidophilus-pectin, citrus 25 million cell -100 mg tab tablet Take 2 Tablets by mouth daily. Qty: 30 Tablet, Refills: 0      amLODIPine (NORVASC) 5 mg tablet Take 1 Tablet by mouth daily. Qty: 30 Tablet, Refills: 0      artificial saliva (MOUTH KOTE) spra Take 1 Spray by mouth three (3) times daily. Qty: 10 mL, Refills: 0      latanoprost (XALATAN) 0.005 % ophthalmic solution Administer 1 Drop to right eye every evening.   Qty: 10 mL, Refills: 0      atorvastatin (LIPITOR) 20 mg tablet TAKE 1 TABLET BY MOUTH EVERY NIGHT  Qty: 30 Tablet, Refills: 5    Associated Diagnoses: Type II or unspecified type diabetes mellitus with neurological manifestations, uncontrolled(250.62) ([facility] Essential hypertension; Hyperlipidemia, unspecified hyperlipidemia type; Vitamin D deficiency; Vitamin B12 deficiency; Neuropathy; Type 2 diabetes mellitus with hyperglycemia, with long-term current use of insulin ([facility] Diabetes mellitus with neurological manifestations, uncontrolled; Mixed hyperlipidemia; PAD (peripheral artery disease) ([facility] Hypercalcemia; Uncontrolled type 2 diabetes mellitus with hyperglycemia, with long-term current use of insulin ([facility])      brimonidine (ALPHAGAN) 0.2 % ophthalmic solution Administer 1 Drop to both eyes three (3) times daily. aspirin delayed-release 81 mg tablet Take  by mouth daily. ferrous sulfate 325 mg (65 mg iron) tablet Take 1 Tab by mouth two (2) times a day. Qty: 60 Tab, Refills: 5    Associated Diagnoses: Diabetes mellitus with neurological manifestations, uncontrolled; Essential hypertension; PAD (peripheral artery disease) ([facility] Mixed hyperlipidemia; Type 2 diabetes mellitus with hyperglycemia, with long-term current use of insulin ([facility] Hypercalcemia; Uncontrolled type 2 diabetes mellitus with hyperglycemia, with long-term current use of insulin ([facility] Type II or unspecified type diabetes mellitus with neurological manifestations, uncontrolled(250.62) ([facility] Hyperlipidemia, unspecified hyperlipidemia type; Vitamin D deficiency; Vitamin B12 deficiency; Neuropathy         STOP taking these medications       lidocaine (XYLOCAINE) 4 % (40 mg/mL) topical solution Comments:   Reason for Stopping:         metoprolol succinate (TOPROL-XL) 50 mg XL tablet Comments:   Reason for Stopping:                 NOTIFY YOUR PHYSICIAN FOR ANY OF THE FOLLOWING:   Fever over 101 degrees for 24 hours. Chest pain, shortness of breath, fever, chills, nausea, vomiting, diarrhea, change in mentation, falling, weakness, bleeding. Severe pain or pain not relieved by medications.   Or, any other signs or symptoms that you may have questions about.    DISPOSITION:  x  Home With:   OT  PT  HH  RN       Long term SNF/Inpatient Rehab    Independent/assisted living    Hospice    Other:       PATIENT CONDITION AT DISCHARGE: Stable      PHYSICAL EXAMINATION AT DISCHARGE:  General:          Alert, cooperative, no distress, appears stated age. HEENT:           Atraumatic, anicteric sclerae, pink conjunctivae                          No oral ulcers, mucosa moist, throat clear, dentition fair  Neck:               Supple, symmetrical  Lungs:             Clear to auscultation bilaterally. No Wheezing or Rhonchi. No rales. Chest wall:      No tenderness  No Accessory muscle use. Heart:              Regular  rhythm,  No  murmur   No edema  Abdomen:        Soft, non-tender. Not distended. Bowel sounds normal  Extremities:     No cyanosis. No clubbing,                            Skin turgor normal, Capillary refill normal  Skin:                Not pale. Not Jaundiced  No rashes   Psych:             Not anxious or agitated. Neurologic:      Alert, moves all extremities, answers questions appropriately and responds to commands     XR ABD (KUB)   Final Result   Percutaneous gastrostomy tube tip in the gastric antrum. XR CHEST PORT   Final Result   No acute pulmonary process. XR CHEST PORT   Final Result   No significant change. XR CHEST PORT   Final Result      XR CHEST PORT   Final Result   No acute cardiopulmonary abnormality. .       XR CHEST PORT   Final Result      CT HEAD WO CONT   Final Result   Chronic changes which appear stable. No acute or active intracranial process. Chronic paranasal sinus disease         XR CHEST PORT   Final Result   No acute findings.            Recent Results (from the past 24 hour(s))   GLUCOSE, POC    Collection Time: 04/18/22  6:04 PM   Result Value Ref Range    Glucose (POC) 127 (H) 65 - 117 mg/dL    Performed by Javi Amezquita, POC    Collection Time: 04/19/22 12:12 AM   Result Value Ref Range    Glucose (POC) 188 (H) 65 - 117 mg/dL    Performed by [place] [facility]    GLUCOSE, POC    Collection Time: 04/19/22  5:40 AM   Result Value Ref Range    Glucose (POC) 252 (H) 65 - 117 mg/dL    Performed by [facility]    METABOLIC PANEL, COMPREHENSIVE    Collection Time: 04/19/22  7:10 AM   Result Value Ref Range    Sodium 138 136 - 145 mmol/L    Potassium 4.6 3.5 - 5.1 mmol/L    Chloride 108 97 - 108 mmol/L    CO2 24 21 - 32 mmol/L    Anion gap 6 5 - 15 mmol/L    Glucose 260 (H) 65 - 100 mg/dL    BUN 26 (H) 6 - 20 mg/dL    Creatinine 0.80 0.55 - 1.02 mg/dL    BUN/Creatinine ratio 33 (H) 12 - 20      GFR est AA >60 >60 ml/min/1.73m2    GFR est non-AA >60 >60 ml/min/1.73m2    Calcium 7.8 (L) 8.5 - 10.1 mg/dL    Bilirubin, total 0.2 0.2 - 1.0 mg/dL    AST (SGOT) 42 (H) 15 - 37 U/L    ALT (SGPT) 61 12 - 78 U/L    Alk. phosphatase 101 45 - 117 U/L    Protein, total 5.8 (L) 6.4 - 8.2 g/dL    Albumin 1.5 (L) 3.5 - 5.0 g/dL    Globulin 4.3 (H) 2.0 - 4.0 g/dL    A-G Ratio 0.3 (L) 1.1 - 2.2     CBC WITH AUTOMATED DIFF    Collection Time: 04/19/22  7:10 AM   Result Value Ref Range    WBC 12.9 (H) 3.6 - 11.0 K/uL    RBC 3.16 (L) 3.80 - 5.20 M/uL    HGB 9.1 (L) 11.5 - 16.0 g/dL    HCT 28.7 (L) 35.0 - 47.0 %    MCV 90.8 80.0 - 99.0 FL    MCH 28.8 26.0 - 34.0 PG    MCHC 31.7 30.0 - 36.5 g/dL    RDW 16.5 (H) 11.5 - 14.5 %    PLATELET 152 731 - 984 K/uL    MPV 12.8 8.9 - 12.9 FL    NRBC 0.0 0.0  WBC    ABSOLUTE NRBC 0.00 0.00 - 0.01 K/uL    NEUTROPHILS 79 (H) 32 - 75 %    LYMPHOCYTES 15 12 - 49 %    MONOCYTES 4 (L) 5 - 13 %    EOSINOPHILS 1 0 - 7 %    BASOPHILS 0 0 - 1 %    IMMATURE GRANULOCYTES 1 (H) 0 - 0.5 %    ABS. NEUTROPHILS 10.2 (H) 1.8 - 8.0 K/UL    ABS. LYMPHOCYTES 1.9 0.8 - 3.5 K/UL    ABS. MONOCYTES 0.5 0.0 - 1.0 K/UL    ABS. EOSINOPHILS 0.1 0.0 - 0.4 K/UL    ABS. BASOPHILS 0.0 0.0 - 0.1 K/UL    ABS. IMM.  GRANS. 0.1 (H) 0.00 - 0.04 K/UL    DF AUTOMATED     C REACTIVE PROTEIN, QT    Collection Time: 04/19/22 7:10 AM   Result Value Ref Range    C-Reactive protein 7.68 (H) 0.00 - 0.60 mg/dL   PROCALCITONIN    Collection Time: 04/19/22  7:10 AM   Result Value Ref Range    Procalcitonin 5.34 (H) 0 ng/mL   GLUCOSE, POC    Collection Time: 04/19/22 12:06 PM   Result Value Ref Range    Glucose (POC) 333 (H) 65 - 117 mg/dL    Performed by [address] [place] [address] Rd:    HPI: Patient is a 69 y. o. year old female with signal past medical history of CVA with PEG tube left great toe gangrene, chronic kidney disease CVA with right-sided weakness hypertension type 2 diabetes bedridden open eyes do not follow any command was transferred to the hospital with fever and altered mental status as per SNF staff patient was awake and following simple command at the nursing home facility and since yesterday not able to follow any command confused transferred to the ER seen by the ER physician work-up shows acute kidney injury with hyperkalemia        Admitted for further evaluation and treat.     04/05   Patient is seen at bedside with daughter and nephew today. Patient is in distress due to pain and daughter notes that patient gets Neurontin TID for neuropathy daily. Otherwise, patient is still receiving enteric feeding through PEG tube and getting IVF.      Patient was seen by nephrology yesterday. Recommends obtaining renal panel and continuing IVF. Patient was seen by ID yesterday. Recommends continuing IV vancomycin for urosepsis.      Patient was seen by pulmonology today. Recommends PRBC transfusion.      Labs indicate WBC 16.9, Hgb 6.3, Na 148, Cl 120, BUN 72, Cr 1.71, Ca 8, CRP 28.4, pro-carrie 6.93 and .         04/06   Patient is seen at bedside. Patient received 2 units of PRBC. Patient is on Zosyn.   No new changes today.      Patient seen by the vascular surgeon he recommend great toe amputation  And for sacral wound recommend Medihoney ointment      Labs show increasing Hgb of 9, decreasing WBC 14.7, Na 147, BG 353, BUN 55,   Cr 1.47, and CRP 21.4.     CXR is unremarkable.      04/07  Patient is seen resting at bedside. Patient received one unit of PRBC with increased Hgb of 10.6. No acute changes today.     Patient is seen by nephrology who recommends free water flushes to correct hypernatremia.      Labs show increasing Hgb of 10.6, WBC 15.1, pro-calcitonin 3.78, and CRP 16. 1.     4/8     Patient had a rapid response this morning ABG and chest x-ray was ordered  Patient tachycardic tachypneic  ABG  pH 7.51 PCO2 31 PO2 65 bicarb 26 oxygen saturation 95% on room air     Chest x-ray shows no much changes     4/9     Patient resting the bed open eyes  On room air/breathing through the mouth     4/10     Patient open eyes not in distress breathing through her mouth  According to the nurse patient not sleep all day and all night yesterday        4/11  Patient is seen at bedside. She is awake but not in any distress. Patient is on zosyn. PEG tube in place.      Urine culture positive for Pseudomonas.     CXR shows no focal changes. Labs remarkable for WBC  22.3, Hgb 10.5, Na 153, , Cr 1.55 BUN 94, pro-calcitonin 1.64, and CRP 3.33.     4/12  Patient is on Vancomycin, zosyn and D5W. PEG tube in place. Spoke to daughter at bedside. She has no new complaints. Labs remarkable for:  WBC  18.9,  Cr 1.33, BUN 84,  and CRP 2.33 which are decreasing. Hgb 11.2, Na-corrected 158, , and pro-calcitonin 1.93 which are increasing.      Seen by pulmonology. No new recommendations.     Seen by GI. No new recommendations.      Seen by nephrology. recommends IV D5W IVF to decrease hypernatremia.     Seen by ID. Recommends discontinuing fluconazole, follow up blood cultures, and continuing IV zosyn and vancomycin.         4/13/2022  Patient is on Vancomycin, zosyn and D5W. PEG tube in place. Nephrology - recommends IV D5W IVF to decrease hypernatremia. Increase glargine to 25 units twice daily to correct hyperglycemia. Once the D5W is discontinued, the dose of glargine will have to be decreased     ID - Continue IV Zosyn and Vancomycin; discontinue Vancomycin if WBC continues to decrease     Pulmonology - No new recommendations.     GI - No new recommendations.     CXR 4/11/2022 - No acute pulmonary process (no focal changes)      Labs remarkable for:  Blood Glucose 380  WBC  19.8  Hgb 10.3   Na-corrected 154  BUN 69  Cr 1.22  CRP 3.14  pro-calcitonin 2.28      4/14  Patient's family members are present at bedside with the patient. They notes that the patient was in distress this morning. Patient still on vancomycin and zosyn. PEG tube in place and patient is receiving enteral feedings.      Seen by vascular surgeon who recommends great toe amputation.      Labs remarkable for Na-corrected 154 improving, Cr 1.19 improving, pro-carrie 2.42,  and CRP 7.65.     4/15  Patient is seen grunting and restless at bedside. Daughter is at bedside. She has no new complaints.      UA on 4/14 still positive for WBC>100, leukocyte esterase and grew yeast.  Labs remarkable for WBC 15.9 decreasing, Hb 9.5, Na-corrected 154, and CRP 7.99.     Patient seen by ID. Recommends continuing zosyn and discontinuing vancomycin.     4/18  Patient is seen at bedside. She is awake and alert x3. She is able to say a few words this morning. She has no new complaints. She is on zosyn and fluconazole. PEG tube is clogged.     Seen by vascular surgeon. Recommends great toe and second toe amputation. Awaiting for consent from daughter.      Labs remarkable for WBC 11.4 decreasing, Hb 9.8, Na 145, and pro-calcitonin 2.01.     4/19  Patient was talking this morning and has no new complaints today. Patient is still receiving fluconazole. PEG tube in place and working.      Labs remarkable for WBC 12.9, Hgb 9.1, and CRP 7.68. Seen by ID. Recommends transitioning to oral fluconazole 200 mg daily for 11 more days.        History of present illness patient 54 physical at the time of admission as a patient admitted with sepsis UTI acute on chronic kidney disease hyponatremia patient history of CVA history of gangrene of the left great toe sacral decubitus ulcers patient was seen by wound care also seen by the vascular surgeon recommend to continue monitor the gangrene of the foot no surgery at this time patient also seen by the wound care nurse regarding sacral decubitus ulcer no need of debridement at this time patient was treated for hypernatremia and acute kidney injury by the nephrologist which significant improvement    Seen by infectious disease treated with IV Zosyn now discharging back to nursing home on full p.o. fluconazole    Other GI as PEG tube came out not functioning well replaced yesterday patient tolerated the procedure well and tolerating the feeding today    Otherwise patient remained stable follow-up with vascular surgeon as an outpatient regarding gangrene of the foot    Change position every 2 hours at the nursing home for sacral decubitus ulcer keep the wounds clean      Medication reconciliation done time shopping 35 minutes 50% time spent counseling on coordination of    Discharged on fluconazole 200 mg daily for 11 days    Signed:   Carlyn Brewer MD  4/19/2022  1:39 PM

## 2022-04-19 NOTE — PROGRESS NOTES
Problem: Diabetes Self-Management  Goal: *Disease process and treatment process  Description: Define diabetes and identify own type of diabetes; list 3 options for treating diabetes. Outcome: Progressing Towards Goal  Goal: *Incorporating nutritional management into lifestyle  Description: Describe effect of type, amount and timing of food on blood glucose; list 3 methods for planning meals. Outcome: Progressing Towards Goal  Goal: *Incorporating physical activity into lifestyle  Description: State effect of exercise on blood glucose levels. Outcome: Progressing Towards Goal  Goal: *Developing strategies to promote health/change behavior  Description: Define the ABC's of diabetes; identify appropriate screenings, schedule and personal plan for screenings. Outcome: Progressing Towards Goal  Goal: *Using medications safely  Description: State effect of diabetes medications on diabetes; name diabetes medication taking, action and side effects. Outcome: Progressing Towards Goal  Goal: *Monitoring blood glucose, interpreting and using results  Description: Identify recommended blood glucose targets  and personal targets. Outcome: Progressing Towards Goal  Goal: *Prevention, detection, treatment of acute complications  Description: List symptoms of hyper- and hypoglycemia; describe how to treat low blood sugar and actions for lowering  high blood glucose level. Outcome: Progressing Towards Goal  Goal: *Prevention, detection and treatment of chronic complications  Description: Define the natural course of diabetes and describe the relationship of blood glucose levels to long term complications of diabetes.   Outcome: Progressing Towards Goal  Goal: *Developing strategies to address psychosocial issues  Description: Describe feelings about living with diabetes; identify support needed and support network  Outcome: Progressing Towards Goal  Goal: *Insulin pump training  Outcome: Progressing Towards Goal  Goal: *Sick day guidelines  Outcome: Progressing Towards Goal  Goal: *Patient Specific Goal (EDIT GOAL, INSERT TEXT)  Outcome: Progressing Towards Goal     Problem: Patient Education: Go to Patient Education Activity  Goal: Patient/Family Education  Outcome: Progressing Towards Goal     Problem: Pressure Injury - Risk of  Goal: *Prevention of pressure injury  Description: Document Shakir Scale and appropriate interventions in the flowsheet. Outcome: Progressing Towards Goal  Note: Pressure Injury Interventions:  Sensory Interventions: Assess changes in LOC,Assess need for specialty bed,Avoid rigorous massage over bony prominences,Check visual cues for pain,Float heels,Keep linens dry and wrinkle-free,Minimize linen layers,Monitor skin under medical devices,Pad between skin to skin,Pressure redistribution bed/mattress (bed type),Turn and reposition approx. every two hours (pillows and wedges if needed)    Moisture Interventions: Absorbent underpads    Activity Interventions: Increase time out of bed    Mobility Interventions: Float heels,Pressure redistribution bed/mattress (bed type),PT/OT evaluation,Turn and reposition approx. every two hours(pillow and wedges)    Nutrition Interventions: Document food/fluid/supplement intake,Discuss nutritional consult with provider,Offer support with meals,snacks and hydration    Friction and Shear Interventions: Minimize layers                Problem: Patient Education: Go to Patient Education Activity  Goal: Patient/Family Education  Outcome: Progressing Towards Goal     Problem: Falls - Risk of  Goal: *Absence of Falls  Description: Document Maryann Fall Risk and appropriate interventions in the flowsheet.   Outcome: Progressing Towards Goal  Note: Fall Risk Interventions:  Mobility Interventions: Bed/chair exit alarm,Communicate number of staff needed for ambulation/transfer    Mentation Interventions: Adequate sleep, hydration, pain control    Medication Interventions: Evaluate medications/consider consulting pharmacy    Elimination Interventions: Bed/chair exit alarm,Call light in reach,Patient to call for help with toileting needs              Problem: Patient Education: Go to Patient Education Activity  Goal: Patient/Family Education  Outcome: Progressing Towards Goal     Problem: Impaired Skin Integrity/Pressure Injury Treatment  Goal: *Improvement of Existing Pressure Injury  Outcome: Progressing Towards Goal  Goal: *Prevention of pressure injury  Description: Document Shakir Scale and appropriate interventions in the flowsheet. Outcome: Progressing Towards Goal  Note: Pressure Injury Interventions:  Sensory Interventions: Assess changes in LOC,Assess need for specialty bed,Avoid rigorous massage over bony prominences,Check visual cues for pain,Float heels,Keep linens dry and wrinkle-free,Minimize linen layers,Monitor skin under medical devices,Pad between skin to skin,Pressure redistribution bed/mattress (bed type),Turn and reposition approx. every two hours (pillows and wedges if needed)    Moisture Interventions: Absorbent underpads    Activity Interventions: Increase time out of bed    Mobility Interventions: Float heels,Pressure redistribution bed/mattress (bed type),PT/OT evaluation,Turn and reposition approx.  every two hours(pillow and wedges)    Nutrition Interventions: Document food/fluid/supplement intake,Discuss nutritional consult with provider,Offer support with meals,snacks and hydration    Friction and Shear Interventions: Minimize layers                Problem: Patient Education: Go to Patient Education Activity  Goal: Patient/Family Education  Outcome: Progressing Towards Goal

## 2022-04-19 NOTE — PROGRESS NOTES
Progress Note    Patient: Cedric Stevens MRN: [id number]  SSN: [id number]    YOB: 1945  Age: 78 y.o. Sex: female      Admit Date: 4/3/2022    LOS: 15 days     Subjective:   Patient followed for sepsis with UTI secondary to Pseudomonas sensitive to Zosyn, which has been completed. Now Candida UTI on Fluconazole. Patient remains nonverbal but appeared to be resting comfortably. Daughter at bedside stated that she spoke a few words again earlier today. Staff report that sacral wound is largely dry with eschar. She remains afebrile but WBC and procal are increasing. Vascular Surgery following closely with concern because of gangrene with plans to discuss amputation with family. Objective:     Vitals:    04/18/22 2228 04/19/22 0545 04/19/22 0800 04/19/22 1129   BP: (!) 165/67 (!) 157/76 (!) 144/60 (!) 142/60   Pulse: 78 83 70    Resp: 18  20    Temp: 97.9 °F (36.6 °C)  98.4 °F (36.9 °C)    SpO2: 97%  100%    Weight:       Height:            Intake and Output:  Current Shift: 04/19 0701 - 04/19 1900  In: 50   Out: -   Last three shifts: 04/17 1901 - 04/19 0700  In: 853   Out: 3875 [Urine:3875]    Physical Exam:   Vitals and nursing note reviewed. Constitutional:       General: She is not in acute distress. Appearance: She is ill-appearing. HENT: eyes closed, Room Air   Cardiovascular:      Rate and Rhythm: Normal rate and regular rhythm. Heart sounds: No murmur heard. Pulmonary:      Effort: Pulmonary effort is normal.      Breath sounds: Normal breath sounds. Abdominal:      General: Bowel sounds are normal.      Palpations: Abdomen is soft. Tenderness: There is no abdominal tenderness. Comments: PEG tube in place, site unremarkable  Genitourinary:     Comments: Bell catheter with moderate urine sediment  Flexiseal with empty bag at this time  Musculoskeletal:      Right lower leg: No edema. Left lower leg: No edema.       Comments: Left great toe with dry gangrene, dark and shrunken, second toe gangrene  Skin: Stage 2 sacral wound     Findings: No rash. Neurological:      Mental Status: She is alert. Comments: Unable to assess   Psychiatric:      Comments: Unable to assess      Lab/Data Review:     WBC 12,900  UA with WBC >100, budding yeasts    Procal 5.34 <1.34 <1.20 <1.55 <2.01 <2.42 <6.93  CRP 7.68 <7.99 <7.79 <5.89 <6.55 3.44 <16.10 <21.40 <28.40    Blood cultures (4/3) No growth FINAL  Blood cultures (4/10)  No growth FINAL   Urine culture (4/7) >100,000 cfu/ml Pseudomonas aeruginosa   Urine culture (4/14) >100,000 cfu/ml mixed urogenital yasmine    CXR (4/11) No acute pulmonary process    Assessment:     Active Problems:    Hyperkalemia (4/4/2022)      UTI (urinary tract infection) (4/4/2022)      Sepsis ([facility] [address] (4/4/2022)      RICARDO (acute kidney injury) ([facility] [address] (4/4/2022)      AMS (altered mental status) (4/4/2022)    1. Sepsis with fever and leukocytosis, elevated procal and CRP, resolving  2. UTI with marked pyuria and bacteriuria, secondary to Pseudomonas aeruginosa, Day #14 IV Zosyn  3. Altered mentals status, multifactorial including sepsis  4. Uncontrolled diabetes mellitus with hyperglycemia  5. Chronic maxillary sinusitis  6. ASCVD with prior stroke  7. Hepatitis C antibody positive, HCV RNA negative  8. 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 Sacral wound, Stage 2, no evidence of gross infection  10. Acute hypoxic respiratory failure, resolved  11. Diarrhea, presumed secondary to tube feeding  12. Candida UTI, presumptive, with marked pyuria and yeasts, Day #3 IV Fluconazole    Comment:  WBC increasing today, along with procal and CRP. Suspicion for gangrene left foot being responsible. Plan:   1. Discontinue IV Zosyn  2. Continue Fluconazole, reasonable to transition to oral Fluconazole 200 mg daily for 11 more days  3. Repeat urinalysis  4. Culture sacral wound at next dressing change  5.  Possible amputation left foot  6.  In am, repeat CBC, procal and CRP        Signed By: Jose Mortensen MD     April 19, 2022

## 2022-04-19 NOTE — PROGRESS NOTES
CM spoke with attending and he stated patient was ready for discharge. CM notified [facility] and requested they start auth. They requested therapy evals, however, therapy has discontinued orders. CM notified therapy team patient is not a LTC resident, however, they stated patient is not able to actively participate with therapy and is not appropriate at this time. CM notified [facility] and requested they try for insurance auth with the PEG tube. CM will continue to follow.

## 2022-04-19 NOTE — PROGRESS NOTES
PROGRESS NOTE    Patient: Malinda Rosado MRN: [id number]  SSN: [id number]    YOB: 1945  Age: 78 y.o. Sex: female      Admit Date: 4/3/2022    LOS: 15 days       Subjective     Chief Complaint   Patient presents with    Altered mental status       HPI: Patient is a 78y.o. 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 04/05   Patient is seen at bedside with daughter and nephew today. Patient is in distress due to pain and daughter notes that patient gets Neurontin TID for neuropathy daily. Otherwise, patient is still receiving enteric feeding through PEG tube and getting IVF. Patient was seen by nephrology yesterday. Recommends obtaining renal panel and continuing IVF. Patient was seen by ID yesterday. Recommends continuing IV vancomycin for urosepsis. Patient was seen by pulmonology today. Recommends PRBC transfusion. Labs indicate WBC 16.9, Hgb 6.3, Na 148, Cl 120, BUN 72, Cr 1.71, Ca 8, CRP 28.4, pro-carrie 6.93 and .       04/06   Patient is seen at bedside. Patient received 2 units of PRBC. Patient is on Zosyn. No new changes today. Patient seen by the vascular surgeon he recommend great toe amputation  And for sacral wound recommend Medihoney ointment     Labs show increasing Hgb of 9, decreasing WBC 14.7, Na 147, , BUN 55,   Cr 1.47, and CRP 21.4. CXR is unremarkable. 04/07  Patient is seen resting at bedside.  Patient received one unit of PRBC with increased Hgb of 10.6. No acute changes today. Patient is seen by nephrology who recommends free water flushes to correct hypernatremia. Labs show increasing Hgb of 10.6, WBC 15.1, pro-calcitonin 3.78, and CRP 16.1.    4/8    Patient had a rapid response this morning ABG and chest x-ray was ordered  Patient tachycardic tachypneic  ABG  pH 7.51 PCO2 31 PO2 65 bicarb 26 oxygen saturation 95% on room air    Chest x-ray shows no much changes    4/9    Patient resting the bed open eyes  On room air/breathing through the mouth    4/10    Patient open eyes not in distress breathing through her mouth  According to the nurse patient not sleep all day and all night yesterday      4/11  Patient is seen at bedside. She is awake but not in any distress. Patient is on zosyn. PEG tube in place. Urine culture positive for Pseudomonas. CXR shows no focal changes. Labs remarkable for WBC  22.3, Hgb 10.5, Na 153, , Cr 1.55 BUN 94, pro-calcitonin 1.64, and CRP 3.33.    4/12  Patient is on Vancomycin, zosyn and D5W. PEG tube in place. Spoke to daughter at bedside. She has no new complaints. Labs remarkable for:  WBC  18.9,  Cr 1.33, BUN 84,  and CRP 2.33 which are decreasing. Hgb 11.2, Na-corrected 158, , and pro-calcitonin 1.93 which are increasing. Seen by pulmonology. No new recommendations. Seen by GI. No new recommendations. Seen by nephrology. recommends IV D5W IVF to decrease hypernatremia. Seen by ID. Recommends discontinuing fluconazole, follow up blood cultures, and continuing IV zosyn and vancomycin. 4/13/2022  Patient is on Vancomycin, zosyn and D5W. PEG tube in place. Nephrology - recommends IV D5W IVF to decrease hypernatremia. Increase glargine to 25 units twice daily to correct hyperglycemia.  Once the D5W is discontinued, the dose of glargine will have to be decreased    ID - Continue IV Zosyn and Vancomycin; discontinue Vancomycin if WBC continues to decrease    Pulmonology - No new recommendations. GI - No new recommendations. CXR 4/11/2022 - No acute pulmonary process (no focal changes)     Labs remarkable for:  Blood Glucose 380  WBC  19.8  Hgb 10.3   Na-corrected 154  BUN 69  Cr 1.22  CRP 3.14  pro-calcitonin 2.28     4/14  Patient's family members are present at bedside with the patient. They notes that the patient was in distress this morning. Patient still on vancomycin and zosyn. PEG tube in place and patient is receiving enteral feedings. Seen by vascular surgeon who recommends great toe amputation. Labs remarkable for Na-corrected 154 improving, Cr 1.19 improving, pro-carrie 2.42,  and CRP 7.65.    4/15  Patient is seen grunting and restless at bedside. Daughter is at bedside. She has no new complaints. UA on 4/14 still positive for WBC>100, leukocyte esterase and grew yeast.  Labs remarkable for WBC 15.9 decreasing, Hb 9.5, Na-corrected 154, and CRP 7.99. Patient seen by ID. Recommends continuing zosyn and discontinuing vancomycin. 4/18  Patient is seen at bedside. She is awake and alert x3. She is able to say a few words this morning. She has no new complaints. She is on zosyn and fluconazole. PEG tube is clogged. Seen by vascular surgeon. Recommends great toe and second toe amputation. Awaiting for consent from daughter. Labs remarkable for WBC 11.4 decreasing, Hb 9.8, Na 145, and pro-calcitonin 2.01.    4/19  Patient was talking this morning and has no new complaints today. Patient is still receiving fluconazole. PEG tube in place and working. Labs remarkable for WBC 12.9, Hgb 9.1, and CRP 7.68. Seen by ID. Recommends transitioning to oral fluconazole 200 mg daily for 11 more days. Review of Systems   Unable to perform ROS: Acuity of condition   All other systems reviewed and are negative. Unable to obtain.      Objective     Visit Vitals  BP (!) 144/60   Pulse 70   Temp 98.4 °F (36.9 °C)   Resp 20   Ht 5' 7.99\" (1.727 m)   Wt 191 lb (86.6 kg)   SpO2 100%   BMI 29.05 kg/m²    O2 Flow Rate (L/min): 0 l/min O2 Device: None (Room air)    Physical Exam:   Physical Exam  Constitutional: Open eyes do not follow any commands. Tracks with eyes. HENT:   Head: Normocephalic and atraumatic. Eyes: Pupils are equal, round, and reactive to light. EOM are normal.   Cardiovascular: Normal rate, regular rhythm and normal heart sounds. Pulmonary/Chest: Decreased breath sound   abdominal: Soft. Bowel sounds are normal. There is no abdominal tenderness. There is no rebound and no guarding. Musculoskeletal: Normal range of motion. Neurological: Open eyes do not follow any, right-sided weakness  PEG tube in place. Intake & Output:  Current Shift: 04/19 0701 - 04/19 1900  In: 50   Out: -   Last three shifts: 04/17 1901 - 04/19 0700  In: 853   Out: 8687 [Urine:3875]    Lab/Data Review:  Lab results reviewed. For significant abnormal values and values requiring intervention, see assessment and plan.        24 Hour Results:    Recent Results (from the past 24 hour(s))   GLUCOSE, POC    Collection Time: 04/18/22 11:40 AM   Result Value Ref Range    Glucose (POC) 59 (L) 65 - 117 mg/dL    Performed by [employer]    GLUCOSE, POC    Collection Time: 04/18/22 12:29 PM   Result Value Ref Range    Glucose (POC) 113 65 - 117 mg/dL    Performed by [employer]    GLUCOSE, POC    Collection Time: 04/18/22  6:04 PM   Result Value Ref Range    Glucose (POC) 127 (H) 65 - 117 mg/dL    Performed by Javi Brady Final, POC    Collection Time: 04/19/22 12:12 AM   Result Value Ref Range    Glucose (POC) 188 (H) 65 - 117 mg/dL    Performed by [employer]    GLUCOSE, POC    Collection Time: 04/19/22  5:40 AM   Result Value Ref Range    Glucose (POC) 252 (H) 65 - 117 mg/dL    Performed by [employer]    METABOLIC PANEL, COMPREHENSIVE    Collection Time: 04/19/22  7:10 AM   Result Value Ref Range    Sodium 138 136 - 145 mmol/L    Potassium 4.6 3.5 - 5.1 mmol/L    Chloride 108 97 - 108 mmol/L    CO2 24 21 - 32 mmol/L    Anion gap 6 5 - 15 mmol/L    Glucose 260 (H) 65 - 100 mg/dL    BUN 26 (H) 6 - 20 mg/dL    Creatinine 0.80 0.55 - 1.02 mg/dL    BUN/Creatinine ratio 33 (H) 12 - 20      GFR est AA >60 >60 ml/min/1.73m2    GFR est non-AA >60 >60 ml/min/1.73m2    Calcium 7.8 (L) 8.5 - 10.1 mg/dL    Bilirubin, total 0.2 0.2 - 1.0 mg/dL    AST (SGOT) 42 (H) 15 - 37 U/L    ALT (SGPT) 61 12 - 78 U/L    Alk. phosphatase 101 45 - 117 U/L    Protein, total 5.8 (L) 6.4 - 8.2 g/dL    Albumin 1.5 (L) 3.5 - 5.0 g/dL    Globulin 4.3 (H) 2.0 - 4.0 g/dL    A-G Ratio 0.3 (L) 1.1 - 2.2     CBC WITH AUTOMATED DIFF    Collection Time: 04/19/22  7:10 AM   Result Value Ref Range    WBC 12.9 (H) 3.6 - 11.0 K/uL    RBC 3.16 (L) 3.80 - 5.20 M/uL    HGB 9.1 (L) 11.5 - 16.0 g/dL    HCT 28.7 (L) 35.0 - 47.0 %    MCV 90.8 80.0 - 99.0 FL    MCH 28.8 26.0 - 34.0 PG    MCHC 31.7 30.0 - 36.5 g/dL    RDW 16.5 (H) 11.5 - 14.5 %    PLATELET 079 089 - 122 K/uL    MPV 12.8 8.9 - 12.9 FL    NRBC 0.0 0.0  WBC    ABSOLUTE NRBC 0.00 0.00 - 0.01 K/uL    NEUTROPHILS 79 (H) 32 - 75 %    LYMPHOCYTES 15 12 - 49 %    MONOCYTES 4 (L) 5 - 13 %    EOSINOPHILS 1 0 - 7 %    BASOPHILS 0 0 - 1 %    IMMATURE GRANULOCYTES 1 (H) 0 - 0.5 %    ABS. NEUTROPHILS 10.2 (H) 1.8 - 8.0 K/UL    ABS. LYMPHOCYTES 1.9 0.8 - 3.5 K/UL    ABS. MONOCYTES 0.5 0.0 - 1.0 K/UL    ABS. EOSINOPHILS 0.1 0.0 - 0.4 K/UL    ABS. BASOPHILS 0.0 0.0 - 0.1 K/UL    ABS. IMM. GRANS. 0.1 (H) 0.00 - 0.04 K/UL    DF AUTOMATED     C REACTIVE PROTEIN, QT    Collection Time: 04/19/22  7:10 AM   Result Value Ref Range    C-Reactive protein 7.68 (H) 0.00 - 0.60 mg/dL         Imaging:    XR ABD (KUB)   Final Result   Percutaneous gastrostomy tube tip in the gastric antrum. XR CHEST PORT   Final Result   No acute pulmonary process. XR CHEST PORT   Final Result   No significant change.       XR CHEST PORT   Final Result      XR CHEST PORT   Final Result   No acute cardiopulmonary abnormality. .       XR CHEST PORT   Final Result      CT HEAD WO CONT   Final Result   Chronic changes which appear stable. No acute or active intracranial process. Chronic paranasal sinus disease         XR CHEST PORT   Final Result   No acute findings. Assessment   Severe sepsis with UTI  UTI/Pseudomonas  Acute on chronic kidney disease  Hyperkalemia  Hypernatremia, improving  CVA with right-sided weakness  Anemia of chronic disease  Gangrene of the left great toe  Sacral decubitus ulcer  Acute respiratory failure  Possible aspiration  Elevated BNP  PEG tube malfunction/seen by the GI now working    Plan     Amlodipine 5 mg daily  Aspirin 81 mg daily  Lipitor 20 daily  Ferrous sulfate 300 twice a day  Gabapentin 300 mg once in the morning and at bedtime  Heparin 5000 units subcu every 8 hours  Hydralazine 50 mg 3 times a day  Lantus 22 units subcu twice a day  Metoprolol 50 mg twice a day  IV Zosyn 3.375 IV every 8 hours  Discontinue Vancomycin  Flucanazole 100 mg daily  Monitor renal function and sodium level.   Pulmonary nephrology infectious disease and vascular surgical.  Schedule for amputation of the left great toe  Start back on gabapentin at night only  Blood sugars running high secondary D5 at 75 cc/h as per nephrology    Discussed with the patient's daughter at the bedside          Current Facility-Administered Medications:     dextrose 5% infusion, 100 mL/hr, IntraVENous, CONTINUOUS, Mickey Louise MD, Last Rate: 100 mL/hr at 04/19/22 0201, 100 mL/hr at 04/19/22 0201    fluconazole (DIFLUCAN) 200mg/100 mL IVPB (premix), 200 mg, IntraVENous, Q24H, Crys Chin MD, Last Rate: 100 mL/hr at 04/18/22 1526, 200 mg at 04/18/22 1526    gabapentin (NEURONTIN) capsule 300 mg, 300 mg, Oral, BID, Israel Soto MD, 300 mg at 04/19/22 0900    insulin glargine (LANTUS) injection 32 Units, 32 Units, SubCUTAneous, BID, Roldan Song MD, 32 Units at 04/19/22 0859    artificial saliva (MOUTH KOTE) 1 Spray, 1 Spray, Oral, PRN, Israel Soto MD, 1 Spray at 04/15/22 1053    amLODIPine (NORVASC) tablet 5 mg, 5 mg, Oral, DAILY, Israel Soto MD, 5 mg at 04/19/22 0900    metoprolol tartrate (LOPRESSOR) tablet 50 mg, 50 mg, Oral, BID, Israel Soto MD, 50 mg at 04/19/22 0900    nitroglycerin (NITROBID) 2 % ointment 1 Inch, 1 Inch, Topical, Q6H, Thomas Boogie MD, 1 Inch at 04/19/22 0545    hydrALAZINE (APRESOLINE) tablet 50 mg, 50 mg, Oral, TID, Israel Soto MD, 50 mg at 04/19/22 0900    [Held by provider] furosemide (LASIX) injection 40 mg, 40 mg, IntraVENous, DAILY, Israel Soto MD, 40 mg at 04/09/22 1054    labetaloL (NORMODYNE;TRANDATE) 20 mg/4 mL (5 mg/mL) injection 20 mg, 20 mg, IntraVENous, Q4H PRN, Israel Soto MD, 20 mg at 04/15/22 0255    hydrALAZINE (APRESOLINE) 20 mg/mL injection 20 mg, 20 mg, IntraVENous, Q6H PRN, Israel Soto MD, 20 mg at 04/16/22 0559    0.9% sodium chloride infusion 250 mL, 250 mL, IntraVENous, PRN, Martínez Crespo MD    insulin lispro (HUMALOG) injection, , SubCUTAneous, Q6H, Israel Soto MD, 7 Units at 04/19/22 0554    atorvastatin (LIPITOR) tablet 20 mg, 20 mg, Oral, QHS, Israel Soto MD, 20 mg at 04/18/22 2228    latanoprost (XALATAN) 0.005 % ophthalmic solution 1 Drop, 1 Drop, Right Eye, QPM, Israel Soto MD, 1 Drop at 04/18/22 1803    aspirin delayed-release tablet 81 mg, 81 mg, Oral, DAILY, Israel Soto MD, 81 mg at 04/19/22 0900    brimonidine (ALPHAGAN) 0.2 % ophthalmic solution 1 Drop, 1 Drop, Both Eyes, TID, Israel Soto MD, 1 Drop at 04/19/22 0916    glucose chewable tablet 16 g, 4 Tablet, Oral, PRN, Israel Soto MD    glucagon (GLUCAGEN) injection 1 mg, 1 mg, IntraMUSCular, PRN, Israel Soto MD, 1 mg at 04/18/22 1145    [DISCONTINUED] acetaminophen (TYLENOL) tablet 650 mg, 650 mg, Oral, Q6H PRN, 650 mg at 04/04/22 2246 **OR** acetaminophen (TYLENOL) suppository 650 mg, 650 mg, Rectal, Q6H PRN, Marcelle Soto MD    polyethylene glycol (MIRALAX) packet 17 g, 17 g, Oral, DAILY PRN, Marcelle Soto MD    ondansetron (ZOFRAN ODT) tablet 4 mg, 4 mg, Oral, Q8H PRN **OR** ondansetron (ZOFRAN) injection 4 mg, 4 mg, IntraVENous, Q6H PRN, Israel Soto MD    heparin (porcine) injection 5,000 Units, 5,000 Units, SubCUTAneous, Q8H, Israel Soto MD, 5,000 Units at 04/19/22 0900    albuterol-ipratropium (DUO-NEB) 2.5 MG-0.5 MG/3 ML, 3 mL, Nebulization, Q6H PRN, Marcelle Soto MD    ferrous sulfate 300 mg (60 mg iron)/5 mL oral syrup 300 mg, 300 mg, Oral, BID, Israel Soto MD, 300 mg at 04/19/22 0900    acetaminophen (TYLENOL) solution 650 mg, 650 mg, Per NG tube, Q6H PRN, Israel Soto MD, 650 mg at 04/19/22 8701    Current Outpatient Medications   Medication Instructions    acidophilus-pectin, citrus 25 million cell -100 mg tab tablet 2 Tablets, Oral, DAILY    amLODIPine (NORVASC) 5 mg, Oral, DAILY    artificial saliva (MOUTH KOTE) spra 1 Spray, Oral, 3 TIMES DAILY    aspirin delayed-release 81 mg tablet Oral, DAILY    atorvastatin (LIPITOR) 20 mg tablet TAKE 1 TABLET BY MOUTH EVERY NIGHT    brimonidine (ALPHAGAN) 0.2 % ophthalmic solution 1 Drop, Both Eyes, 3 TIMES DAILY    ferrous sulfate 325 mg, Oral, 2 TIMES DAILY    gabapentin (NEURONTIN) 300 mg, Oral, 2 TIMES DAILY    insulin glargine (LANTUS) 50 Units, SubCUTAneous, DAILY    latanoprost (XALATAN) 0.005 % ophthalmic solution 1 Drop, Right Eye, EVERY EVENING    lidocaine (XYLOCAINE) 4 % (40 mg/mL) topical solution 1 mL, Topical, AS NEEDED    metoprolol succinate (TOPROL-XL) 50 mg, Oral, 2 TIMES DAILY         Signed By: Clarisse Escalante     April 19, 2022

## 2022-04-20 NOTE — PROGRESS NOTES
PROGRESS NOTE      Chief Complaints:  Patient examined this morning. HPI and  Objective:  She looks very comfortable. She has no fever. No major hemodynamic issues overnight. Review of Systems:  Rest review of systems unable to obtain due to mental status. EXAM:  Visit Vitals  BP (!) 162/63   Pulse 77   Temp 98.7 °F (37.1 °C)   Resp 20   Ht 5' 7.99\" (1.727 m)   Wt 191 lb (86.6 kg)   SpO2 100%   BMI 29.05 kg/m²       Patient is awake   Head and neck atraumatic, normocephalic. ENT: No hoarse voice  Cardiac system regular rate rhythm. Pulmonary no audible wheeze  Chest wall excursion normal with respiration cycle  Abdomen is soft not particularly distended. Neurologically nonfocal.  Skin is warm and moist.  Psychosocial: Cooperative. Vascular examination as previously noted no changes.     Recent Results (from the past 24 hour(s))   GLUCOSE, POC    Collection Time: 04/19/22 12:06 PM   Result Value Ref Range    Glucose (POC) 333 (H) 65 - 117 mg/dL    Performed by Brit Dust    GLUCOSE, POC    Collection Time: 04/19/22  5:12 PM   Result Value Ref Range    Glucose (POC) 257 (H) 65 - 117 mg/dL    Performed by Brit Dust    URINALYSIS W/MICROSCOPIC    Collection Time: 04/19/22  5:45 PM   Result Value Ref Range    Color Yellow/Straw      Appearance Turbid (A) Clear      Specific gravity 1.005 1.003 - 1.030      pH (UA) 7.0 5.0 - 8.0      Protein Negative Negative mg/dL    Glucose >300 (A) Negative mg/dL    Ketone Negative Negative mg/dL    Bilirubin Negative Negative      Blood Negative Negative      Urobilinogen 0.1 0.1 - 1.0 EU/dL    Nitrites Negative Negative      Leukocyte Esterase Large (A) Negative      WBC 20-50 0 - 4 /hpf    RBC >100 (H) 0 - 5 /hpf    Bacteria Negative Negative /hpf    Mucus Trace /lpf    PRESUMPTIVE YEAST Present      Budding yeast Present (A) Negative     GLUCOSE, POC    Collection Time: 04/20/22 12:53 AM   Result Value Ref Range    Glucose (POC) 230 (H) 65 - 117 mg/dL    Performed by Nayely Jorge    GLUCOSE, POC    Collection Time: 04/20/22  5:21 AM   Result Value Ref Range    Glucose (POC) 225 (H) 65 - 117 mg/dL    Performed by Nayely Jorge    GLUCOSE, POC    Collection Time: 04/20/22  7:40 AM   Result Value Ref Range    Glucose (POC) 225 (H) 65 - 117 mg/dL    Performed by EZEQUIEL EDWARDS        ASSESSMENT:   Patient is 78 y.o. with diagnosis of : Active Problems:    Hyperkalemia (4/4/2022)      UTI (urinary tract infection) (4/4/2022)      Sepsis ([facility] [address] (4/4/2022)      RICARDO (acute kidney injury) ([facility] [address] (4/4/2022)      AMS (altered mental status) (4/4/2022)        PLAN:                  patient has persistent low-grade leukocytosis. I scheduled patient to have left great toe and second toe amputation possibly metatarsal area wound debridement as well today pending surgical consent from patient's daughter. Keep her n.p.o., hold PEG tube feeding. Most likely wound will be kept open.

## 2022-04-20 NOTE — ANESTHESIA PREPROCEDURE EVALUATION
Relevant Problems   NEUROLOGY   (+) CVA (cerebral vascular accident) ([facility] [address]      CARDIOVASCULAR   (+) HTN (hypertension)   (+) PAD (peripheral artery disease) (HCC)      RENAL FAILURE   (+) RICARDO (acute kidney injury) ([facility] [address]      ENDOCRINE   (+) DM type 2 causing vascular disease (HCC)   (+) Type 2 diabetes mellitus with nephropathy (HCC)      HEMATOLOGY   (+) Acute osteomyelitis of ankle or foot (HCC)       Anesthetic History   No history of anesthetic complications            Review of Systems / Medical History  Patient summary reviewed, nursing notes reviewed and pertinent labs reviewed    Pulmonary  Within defined limits                 Neuro/Psych       CVA  TIA    Comments: Metabolic Encephalopathy  Peripheral Neuropathy Cardiovascular    Hypertension          Past MI, CAD, PAD and hyperlipidemia      Comments: 4/2022 TTE:    Interpretation Summary         Left Ventricle: Left ventricle size is normal. Normal wall thickness. Normal wall motion. Low normal left ventricular systolic function with a visually estimated EF of 50 - 55%.   Mitral Valve: Mild transvalvular regurgitation.   Tricuspid Valve: Moderate transvalvular regurgitation.      GI/Hepatic/Renal       Hepatitis: type C  Renal disease: CRI       Endo/Other    Diabetes: type 2, using insulin    Blood dyscrasia and anemia     Other Findings            Past Medical History:   Diagnosis Date    Diabetes mellitus ([facility] [address]     HTN (hypertension)     Hyperlipidemia     Neuropathy     PAD (peripheral artery disease) (HCC)     PCI    Sciatica        Past Surgical History:   Procedure Laterality Date    HX AMPUTATION Right     great toe    HX CERVICAL FUSION      HX OTHER SURGICAL Bilateral     Stent placement    HX OTHER SURGICAL      HX VASCULAR STENT Bilateral     2 in right 1 in left    HX VASCULAR STENT Bilateral        Current Outpatient Medications   Medication Instructions    acidophilus-pectin, citrus 25 million cell -100 mg tab tablet 2 Tablets, Oral, DAILY    albuterol-ipratropium (DUO-NEB) 2.5 mg-0.5 mg/3 ml nebu 3 mL, Nebulization, EVERY 6 HOURS AS NEEDED    amLODIPine (NORVASC) 5 mg, Oral, DAILY    artificial saliva (MOUTH KOTE) spra 1 Spray, Oral, 3 TIMES DAILY    aspirin delayed-release 81 mg tablet Oral, DAILY    atorvastatin (LIPITOR) 20 mg tablet TAKE 1 TABLET BY MOUTH EVERY NIGHT    brimonidine (ALPHAGAN) 0.2 % ophthalmic solution 1 Drop, Both Eyes, 3 TIMES DAILY    ferrous sulfate 325 mg, Oral, 2 TIMES DAILY    fluconazole (DIFLUCAN) 200 mg, Oral, DAILY, Fluconazole 200 mg daily for 11 days    gabapentin (NEURONTIN) 300 mg, Oral, 2 TIMES DAILY    hydrALAZINE (APRESOLINE) 50 mg, Oral, 3 TIMES DAILY    insulin glargine (LANTUS) 50 Units, SubCUTAneous, DAILY    latanoprost (XALATAN) 0.005 % ophthalmic solution 1 Drop, Right Eye, EVERY EVENING    lidocaine (XYLOCAINE) 4 % (40 mg/mL) topical solution 1 mL, Topical, AS NEEDED    metoprolol succinate (TOPROL-XL) 50 mg, Oral, 2 TIMES DAILY    metoprolol tartrate (LOPRESSOR) 50 mg, Oral, 2 TIMES DAILY       Current Facility-Administered Medications   Medication Dose Route Frequency    fluconazole (DIFLUCAN) 200mg/100 mL IVPB (premix)  200 mg IntraVENous Q24H    gabapentin (NEURONTIN) capsule 300 mg  300 mg Oral BID    insulin glargine (LANTUS) injection 32 Units  32 Units SubCUTAneous BID    artificial saliva (MOUTH KOTE) 1 Spray  1 Spray Oral PRN    amLODIPine (NORVASC) tablet 5 mg  5 mg Oral DAILY    metoprolol tartrate (LOPRESSOR) tablet 50 mg  50 mg Oral BID    nitroglycerin (NITROBID) 2 % ointment 1 Inch  1 Inch Topical Q6H    hydrALAZINE (APRESOLINE) tablet 50 mg  50 mg Oral TID    [Held by provider] furosemide (LASIX) injection 40 mg  40 mg IntraVENous DAILY    labetaloL (NORMODYNE;TRANDATE) 20 mg/4 mL (5 mg/mL) injection 20 mg  20 mg IntraVENous Q4H PRN    hydrALAZINE (APRESOLINE) 20 mg/mL injection 20 mg  20 mg IntraVENous Q6H PRN    0.9% sodium chloride infusion 250 mL  250 mL IntraVENous PRN    insulin lispro (HUMALOG) injection   SubCUTAneous Q6H    atorvastatin (LIPITOR) tablet 20 mg  20 mg Oral QHS    latanoprost (XALATAN) 0.005 % ophthalmic solution 1 Drop  1 Drop Right Eye QPM    aspirin delayed-release tablet 81 mg  81 mg Oral DAILY    brimonidine (ALPHAGAN) 0.2 % ophthalmic solution 1 Drop  1 Drop Both Eyes TID    glucose chewable tablet 16 g  4 Tablet Oral PRN    glucagon (GLUCAGEN) injection 1 mg  1 mg IntraMUSCular PRN    acetaminophen (TYLENOL) suppository 650 mg  650 mg Rectal Q6H PRN    polyethylene glycol (MIRALAX) packet 17 g  17 g Oral DAILY PRN    ondansetron (ZOFRAN ODT) tablet 4 mg  4 mg Oral Q8H PRN    Or    ondansetron (ZOFRAN) injection 4 mg  4 mg IntraVENous Q6H PRN    heparin (porcine) injection 5,000 Units  5,000 Units SubCUTAneous Q8H    albuterol-ipratropium (DUO-NEB) 2.5 MG-0.5 MG/3 ML  3 mL Nebulization Q6H PRN    ferrous sulfate 300 mg (60 mg iron)/5 mL oral syrup 300 mg  300 mg Oral BID    acetaminophen (TYLENOL) solution 650 mg  650 mg Per NG tube Q6H PRN       Patient Vitals for the past 24 hrs:   Temp Pulse Resp BP SpO2   04/20/22 1530 37 °C (98.6 °F) 65 26 (!) 130/57 99 %   04/20/22 1353 -- -- -- (!) 120/53 --   04/20/22 1351 -- -- -- (!) 125/44 --   04/20/22 0816 37.1 °C (98.7 °F) 71 20 (!) 162/69 96 %   04/19/22 2307 -- -- -- (!) 162/63 --   04/19/22 2041 37.1 °C (98.7 °F) 77 20 (!) 161/65 100 %       Lab Results   Component Value Date/Time    WBC 11.6 (H) 04/20/2022 08:28 AM    HGB 8.9 (L) 04/20/2022 08:28 AM    HCT 28.1 (L) 04/20/2022 08:28 AM    PLATELET 300 95/26/6632 08:28 AM    MCV 88.6 04/20/2022 08:28 AM     Lab Results   Component Value Date/Time    Sodium 138 04/19/2022 07:10 AM    Potassium 4.6 04/19/2022 07:10 AM    Chloride 108 04/19/2022 07:10 AM    CO2 24 04/19/2022 07:10 AM    Anion gap 6 04/19/2022 07:10 AM    Glucose 260 (H) 04/19/2022 07:10 AM    BUN 26 (H) 04/19/2022 07:10 AM    Creatinine 0.80 04/19/2022 07:10 AM    BUN/Creatinine ratio 33 (H) 04/19/2022 07:10 AM    GFR est AA >60 04/19/2022 07:10 AM    GFR est non-AA >60 04/19/2022 07:10 AM    Calcium 7.8 (L) 04/19/2022 07:10 AM     No results found for: APTT, PTP, INR, INREXT  Lab Results   Component Value Date/Time    Glucose 260 (H) 04/19/2022 07:10 AM    Glucose (POC) 141 (H) 04/20/2022 03:37 PM     Physical Exam    Airway  Mallampati: III  TM Distance: 4 - 6 cm  Neck ROM: normal range of motion   Mouth opening: Normal     Cardiovascular    Rhythm: regular  Rate: normal         Dental    Dentition: Poor dentition     Pulmonary  Breath sounds clear to auscultation               Abdominal  GI exam deferred       Other Findings            Anesthetic Plan    ASA: 3  Anesthesia type: total IV anesthesia and MAC          Induction: Intravenous  Anesthetic plan and risks discussed with: Patient and Family

## 2022-04-20 NOTE — WOUND CARE
IP WOUND CONSULT    Shorty Duong  MEDICAL RECORD NUMBER:  [id number]  AGE: 78 y.o. GENDER: female  : 1945  TODAY'S DATE:  2022    GENERAL     [x] Follow-up   [] New Consult    Shorty Duong is a 78 y.o. female referred by:   [x] Physician  [] Nursing  [] Other:         PAST MEDICAL HISTORY    Past Medical History:   Diagnosis Date    Diabetes mellitus ([facility] [address]     HTN (hypertension)     Hyperlipidemia     Neuropathy     PAD (peripheral artery disease) ([facility] [address]     PCI    Sciatica         PAST SURGICAL HISTORY    Past Surgical History:   Procedure Laterality Date    HX AMPUTATION Right     great toe    HX CERVICAL FUSION      HX OTHER SURGICAL Bilateral     Stent placement    HX OTHER SURGICAL      HX VASCULAR STENT Bilateral     2 in right 1 in left    HX VASCULAR STENT Bilateral        FAMILY HISTORY    Family History   Problem Relation Age of Onset    Cancer Mother     Diabetes Mother     Hypertension Mother          ALLERGIES    No Known Allergies    MEDICATIONS    No current facility-administered medications on file prior to encounter. Current Outpatient Medications on File Prior to Encounter   Medication Sig Dispense Refill    gabapentin (NEURONTIN) 300 mg capsule Take 1 Capsule by mouth two (2) times a day. Max Daily Amount: 600 mg. 12 Capsule 0    insulin glargine (LANTUS) 100 unit/mL injection 50 Units by SubCUTAneous route daily. 10 mL 0    acidophilus-pectin, citrus 25 million cell -100 mg tab tablet Take 2 Tablets by mouth daily. 30 Tablet 0    amLODIPine (NORVASC) 5 mg tablet Take 1 Tablet by mouth daily. 30 Tablet 0    artificial saliva (MOUTH KOTE) spra Take 1 Spray by mouth three (3) times daily. 10 mL 0    latanoprost (XALATAN) 0.005 % ophthalmic solution Administer 1 Drop to right eye every evening. 10 mL 0    lidocaine (XYLOCAINE) 4 % (40 mg/mL) topical solution Apply 1 mL to affected area as needed for Pain.  1 Each 0    metoprolol succinate (TOPROL-XL) 50 mg XL tablet Take 1 Tablet by mouth two (2) times a day. 60 Tablet 0    atorvastatin (LIPITOR) 20 mg tablet TAKE 1 TABLET BY MOUTH EVERY NIGHT 30 Tablet 5    brimonidine (ALPHAGAN) 0.2 % ophthalmic solution Administer 1 Drop to both eyes three (3) times daily.  aspirin delayed-release 81 mg tablet Take  by mouth daily.  ferrous sulfate 325 mg (65 mg iron) tablet Take 1 Tab by mouth two (2) times a day. 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  Dressing change: Yes, see flow chart  Verbal consent for picture: Yes per Jace Mobley, daughter    Contributing Factors: anticoagulation therapy, diabetes, poor glucose control, chronic pressure, decreased mobility, shear force, incontinence of stool, incontinence of urine and Hx of CVA with right-sided weakness    Wound Toe (Comment  which one) Anterior;Right Greater toe amputated 02/22/22 (Active)   Wound Etiology Surgical 04/04/22 0345   Dressing Status Clean;Dry 04/04/22 0345   Cleansed Not cleansed 04/04/22 0345   Dressing/Treatment Open to air 04/11/22 2103   Wound Assessment Dry 04/04/22 0345   Number of days: 57       Wound Buttocks 02/23/22 (Active)   Wound Etiology Pressure Unstageable 04/04/22 0345   Dressing Status Other (Comment) 04/19/22 0426   Cleansed Other (Comment) 04/17/22 1358   Dressing/Treatment Honey alginate 04/14/22 0729   Wound Assessment Fluid filled blister; Other (Comment) 04/04/22 0345   Drainage Amount Small 04/13/22 0421   Drainage Description Serosanguinous 04/13/22 0421   Wound Odor Mild;Malodorous/Putrid 04/04/22 0345   Licha-Wound/Incision Assessment Non-Blanchable erythema 04/04/22 0345   Edges Undefined edges 04/04/22 0345   Number of days: 56       Wound Heel Left dark area (Active)   Wound Image   04/20/22 1059   Wound Etiology Diabetic 04/20/22 1059   Dressing Status Clean;Dry 04/04/22 0345   Cleansed Other (Comment) 04/05/22 1054   Dressing/Treatment Open to air 04/20/22 1059   Offloading for Diabetic Foot Ulcers Offloading boot 04/20/22 1059   Wound Length (cm) 8 cm 04/05/22 1054   Wound Width (cm) 7 cm 04/05/22 1054   Wound Depth (cm) 0 cm 04/05/22 1054   Wound Surface Area (cm^2) 56 cm^2 04/05/22 1054   Wound Volume (cm^3) 0 cm^3 04/05/22 1054   Wound Assessment Eschar dry 04/20/22 1059   Drainage Amount None 04/20/22 1059   Wound Odor None 04/20/22 1059   Licha-Wound/Incision Assessment Dry/flaky 04/20/22 1059   Edges Attached edges 04/20/22 1059   Number of days: 54       Wound Toe (Comment  which one) Anterior; Left black left great toe 03/03/22 (Active)   Wound Image   04/20/22 1101   Wound Etiology Other (Comment) 04/20/22 1101   Dressing Status Other (Comment) 04/11/22 1030   Cleansed Cleansed with saline 04/04/22 0345   Dressing/Treatment Open to air 04/20/22 1101   Wound Assessment Dry;Eschar dry;Devitalized tissue 04/20/22 1101   Drainage Amount None 04/20/22 1101   Wound Odor None 04/20/22 1101   Licha-Wound/Incision Assessment Dry/flaky 04/20/22 1101   Number of days: 48       Wound Coccyx Partial Thickness 03/09/22 (Active)   Wound Image   04/20/22 1056   Wound Etiology Pressure Unstageable 04/20/22 1056   Dressing Status New dressing applied 04/20/22 1056   Cleansed Other (Comment) 04/20/22 1056   Dressing/Treatment Honey gel/honey paste; Foam 04/20/22 1056   Dressing Change Due 04/21/22 04/20/22 1056   Wound Length (cm) 12.3 cm 04/20/22 1056   Wound Width (cm) 14.6 cm 04/20/22 1056   Wound Depth (cm) 0.2 cm 04/20/22 1056   Wound Surface Area (cm^2) 179.58 cm^2 04/20/22 1056   Change in Wound Size % -1936.05 04/20/22 1056   Wound Volume (cm^3) 35.916 cm^3 04/20/22 1056   Wound Healing % -1936 04/20/22 1056   Wound Assessment Eschar dry;Pale granulation tissue 04/20/22 1056   Drainage Amount Scant 04/20/22 1056   Drainage Description Serosanguinous 04/20/22 1056   Wound Odor None 04/20/22 1056   Licha-Wound/Incision Assessment Denuded;Fragile 04/20/22 1056   Edges Undefined edges 04/20/22 1056   Wound Thickness Description Partial thickness 04/05/22 1102   Number of days: 42       Wound Leg upper Proximal;Posterior; Left Excoriated skin under the left posterior buttock 03/13/22 (Active)   Cleansed Cleansed with saline 04/14/22 2030   Dressing/Treatment Moisture barrier 04/14/22 2030   Wound Assessment Pink/red 04/14/22 2030   Drainage Amount None 04/14/22 2030   Wound Odor None 04/14/22 2030   Number of days: 38       Wound Heel Right Non-blanchable Erythema 04/05/22 (Active)   Wound Image    04/20/22 1059   Wound Etiology Deep Tissue/Injury 04/20/22 1059   Cleansed Other (Comment) 04/05/22 1059   Dressing/Treatment Open to air 04/20/22 1059   Offloading for Diabetic Foot Ulcers Offloading boot 04/20/22 1059   Wound Assessment Dry;Non-blanchable erythema;Pink/red;Purple/maroon 04/20/22 1059   Drainage Amount None 04/20/22 1059   Wound Odor None 04/20/22 1059   Licha-Wound/Incision Assessment Dry/flaky 04/20/22 1059   Edges Attached edges 04/20/22 1059   Number of days: 15       [REMOVED] Wound Toe (Comment  which one) Anterior; Left Toe nail is greenish yellow with serosanguineous fluid 02/22/22 (Removed)   Number of days: 41          PLAN     Skin Care & Pressure Relief Recommendations  Speciality bed Envella Air-Fluidized Bed  Minimize layers of linen  Turn/reposition approximately every 2 hours  Pillow wedges  Manage incontinence   Promote continence; Skin Protective lotion/cream to buttocks and sacrum daily and as needed with incontinence care  Offloading boots    Shakir 12  Blood Glucose: 225 on 4/20/22                             Albumin: 1.5 on 4/19/22  WBCs: 12.9 on 4/19/22    Support Surface: Envella Air-Fluidized Bed    Physician/Provider notified: Dr. Phong Henderson  Recommendations: Unstageable PI noted to coccyx and buttocks. As previously noted (see note from 4/5/22), the skin to this area was expected to peel away or turn to eschar due to injury sustained prior to admission. Each buttock as a hardened area of eschar that needs to be evaluated for possible need for debridement. Consult entered for Dr. Beronica Venegas. Hopefully he can assess these areas during procedure for amputation of left 1st and 2nd toes today. Daughter Wing Davila present during evaluation and I explained that the eschar development was expected due to the injury to area prior to admission. Continue to apply [address] and cover with foam daily, see dressing order. Order may change pending evaluation by General Surgeon. Patient was turned on right side using foam wedge and I was able to slip my hand beneath sacrum and left buttock to ensure area was properly offloaded. Demonstrated to [facility]. Wounds to heels are relatively unchanged. Dry eschar to left heel. Non-blanchable area to right heel is intact and boggy. Continue to maintain heel boots at all times while in bed for offloading of the heels. Place a pillow between BLEs when turning to prevent pressure related skin injury. Patient has rectal tube and indwelling giraldo catheter to manage GI and  incontinence. Will continue to follow.          Discharge Wound Care Needs: TBD    Teaching completed with:   [] Patient           [] Family member       [] Caregiver          [] Nursing  [] Other    Patient/Caregiver Teaching:  Level of patient/caregiver understanding able to:   [] Indicates understanding       [] Needs reinforcement  [] Unsuccessful      [] Verbal Understanding  [] Demonstrated understanding       [] No evidence of learning  [] Refused teaching         [] N/A       Electronically signed by Jordana Holden RN on 4/20/2022 at 12:02 PM

## 2022-04-20 NOTE — PROGRESS NOTES
PROGRESS NOTE    Patient: Elmer Jimenes MRN: [id number]  SSN: [id number]    YOB: 1945  Age: 78 y.o. Sex: female      Admit Date: 4/3/2022    LOS: 16 days       Subjective     Chief Complaint   Patient presents with    Altered mental status       HPI: Patient is a 78y.o. 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 04/05   Patient is seen at bedside with daughter and nephew today. Patient is in distress due to pain and daughter notes that patient gets Neurontin TID for neuropathy daily. Otherwise, patient is still receiving enteric feeding through PEG tube and getting IVF. Patient was seen by nephrology yesterday. Recommends obtaining renal panel and continuing IVF. Patient was seen by ID yesterday. Recommends continuing IV vancomycin for urosepsis. Patient was seen by pulmonology today. Recommends PRBC transfusion. Labs indicate WBC 16.9, Hgb 6.3, Na 148, Cl 120, BUN 72, Cr 1.71, Ca 8, CRP 28.4, pro-carrie 6.93 and .       04/06   Patient is seen at bedside. Patient received 2 units of PRBC. Patient is on Zosyn. No new changes today. Patient seen by the vascular surgeon he recommend great toe amputation  And for sacral wound recommend Medihoney ointment     Labs show increasing Hgb of 9, decreasing WBC 14.7, Na 147, , BUN 55,   Cr 1.47, and CRP 21.4. CXR is unremarkable. 04/07  Patient is seen resting at bedside.  Patient received one unit of PRBC with increased Hgb of 10.6. No acute changes today. Patient is seen by nephrology who recommends free water flushes to correct hypernatremia. Labs show increasing Hgb of 10.6, WBC 15.1, pro-calcitonin 3.78, and CRP 16.1.    4/8    Patient had a rapid response this morning ABG and chest x-ray was ordered  Patient tachycardic tachypneic  ABG  pH 7.51 PCO2 31 PO2 65 bicarb 26 oxygen saturation 95% on room air    Chest x-ray shows no much changes    4/9    Patient resting the bed open eyes  On room air/breathing through the mouth    4/10    Patient open eyes not in distress breathing through her mouth  According to the nurse patient not sleep all day and all night yesterday      4/11  Patient is seen at bedside. She is awake but not in any distress. Patient is on zosyn. PEG tube in place. Urine culture positive for Pseudomonas. CXR shows no focal changes. Labs remarkable for WBC  22.3, Hgb 10.5, Na 153, , Cr 1.55 BUN 94, pro-calcitonin 1.64, and CRP 3.33.    4/12  Patient is on Vancomycin, zosyn and D5W. PEG tube in place. Spoke to daughter at bedside. She has no new complaints. Labs remarkable for:  WBC  18.9,  Cr 1.33, BUN 84,  and CRP 2.33 which are decreasing. Hgb 11.2, Na-corrected 158, , and pro-calcitonin 1.93 which are increasing. Seen by pulmonology. No new recommendations. Seen by GI. No new recommendations. Seen by nephrology. recommends IV D5W IVF to decrease hypernatremia. Seen by ID. Recommends discontinuing fluconazole, follow up blood cultures, and continuing IV zosyn and vancomycin. 4/13/2022  Patient is on Vancomycin, zosyn and D5W. PEG tube in place. Nephrology - recommends IV D5W IVF to decrease hypernatremia. Increase glargine to 25 units twice daily to correct hyperglycemia.  Once the D5W is discontinued, the dose of glargine will have to be decreased    ID - Continue IV Zosyn and Vancomycin; discontinue Vancomycin if WBC continues to decrease    Pulmonology - No new recommendations. GI - No new recommendations. CXR 4/11/2022 - No acute pulmonary process (no focal changes)     Labs remarkable for:  Blood Glucose 380  WBC  19.8  Hgb 10.3   Na-corrected 154  BUN 69  Cr 1.22  CRP 3.14  pro-calcitonin 2.28     4/14  Patient's family members are present at bedside with the patient. They notes that the patient was in distress this morning. Patient still on vancomycin and zosyn. PEG tube in place and patient is receiving enteral feedings. Seen by vascular surgeon who recommends great toe amputation. Labs remarkable for Na-corrected 154 improving, Cr 1.19 improving, pro-carrie 2.42,  and CRP 7.65.    4/15  Patient is seen grunting and restless at bedside. Daughter is at bedside. She has no new complaints. UA on 4/14 still positive for WBC>100, leukocyte esterase and grew yeast.  Labs remarkable for WBC 15.9 decreasing, Hb 9.5, Na-corrected 154, and CRP 7.99. Patient seen by ID. Recommends continuing zosyn and discontinuing vancomycin. 4/18  Patient is seen at bedside. She is awake and alert x3. She is able to say a few words this morning. She has no new complaints. She is on zosyn and fluconazole. PEG tube is clogged. Seen by vascular surgeon. Recommends great toe and second toe amputation. Awaiting for consent from daughter. Labs remarkable for WBC 11.4 decreasing, Hb 9.8, Na 145, and pro-calcitonin 2.01.    4/19  Patient was talking this morning and has no new complaints today. Patient is still receiving fluconazole. PEG tube in place and working. Labs remarkable for WBC 12.9, Hgb 9.1, and CRP 7.68. Seen by ID. Recommends transitioning to oral fluconazole 200 mg daily for 11 more days. 4/19  Patient is seen at bedside with family members. Patient is getting her left great toe and second toe amputation today. No new changes observed today.      Labs remarkable for WBC 11.6, Hgb 8.9, CRP 7.05 which are all improving. Review of Systems   Unable to perform ROS: Acuity of condition   All other systems reviewed and are negative. Unable to obtain. Objective     Visit Vitals  BP (!) 162/69   Pulse 71   Temp 98.7 °F (37.1 °C)   Resp 20   Ht 5' 7.99\" (1.727 m)   Wt 86.6 kg (191 lb)   SpO2 96%   BMI 29.05 kg/m²    O2 Flow Rate (L/min): 0 l/min O2 Device: None (Room air)    Physical Exam:   Physical Exam  Constitutional: Open eyes do not follow any commands. Tracks with eyes. HENT:   Head: Normocephalic and atraumatic. Eyes: Pupils are equal, round, and reactive to light. EOM are normal.   Cardiovascular: Normal rate, regular rhythm and normal heart sounds. Pulmonary/Chest: Decreased breath sound   abdominal: Soft. Bowel sounds are normal. There is no abdominal tenderness. There is no rebound and no guarding. Musculoskeletal: Normal range of motion. Neurological: Open eyes do not follow any, right-sided weakness  PEG tube in place. Intake & Output:  Current Shift: No intake/output data recorded. Last three shifts: 04/18 1901 - 04/20 0700  In: 2269   Out: 2925 [Urine:2925]    Lab/Data Review:  Lab results reviewed. For significant abnormal values and values requiring intervention, see assessment and plan.        24 Hour Results:    Recent Results (from the past 24 hour(s))   GLUCOSE, POC    Collection Time: 04/19/22  5:12 PM   Result Value Ref Range    Glucose (POC) 257 (H) 65 - 117 mg/dL    Performed by Shruti Merritt    URINALYSIS W/MICROSCOPIC    Collection Time: 04/19/22  5:45 PM   Result Value Ref Range    Color Yellow/Straw      Appearance Turbid (A) Clear      Specific gravity 1.005 1.003 - 1.030      pH (UA) 7.0 5.0 - 8.0      Protein Negative Negative mg/dL    Glucose >300 (A) Negative mg/dL    Ketone Negative Negative mg/dL    Bilirubin Negative Negative      Blood Negative Negative      Urobilinogen 0.1 0.1 - 1.0 EU/dL    Nitrites Negative Negative      Leukocyte Esterase Large (A) Negative      WBC 20-50 0 - 4 /hpf    RBC >100 (H) 0 - 5 /hpf    Bacteria Negative Negative /hpf    Mucus Trace /lpf    PRESUMPTIVE YEAST Present      Budding yeast Present (A) Negative     GLUCOSE, POC    Collection Time: 04/20/22 12:53 AM   Result Value Ref Range    Glucose (POC) 230 (H) 65 - 117 mg/dL    Performed by Breonna Mejia    GLUCOSE, POC    Collection Time: 04/20/22  5:21 AM   Result Value Ref Range    Glucose (POC) 225 (H) 65 - 117 mg/dL    Performed by Breonna Mejia    GLUCOSE, POC    Collection Time: 04/20/22  7:40 AM   Result Value Ref Range    Glucose (POC) 225 (H) 65 - 117 mg/dL    Performed by EZEQUIEL EDWARDS    CBC WITH AUTOMATED DIFF    Collection Time: 04/20/22  8:28 AM   Result Value Ref Range    WBC 11.6 (H) 3.6 - 11.0 K/uL    RBC 3.17 (L) 3.80 - 5.20 M/uL    HGB 8.9 (L) 11.5 - 16.0 g/dL    HCT 28.1 (L) 35.0 - 47.0 %    MCV 88.6 80.0 - 99.0 FL    MCH 28.1 26.0 - 34.0 PG    MCHC 31.7 30.0 - 36.5 g/dL    RDW 16.4 (H) 11.5 - 14.5 %    PLATELET 550 536 - 027 K/uL    MPV 12.5 8.9 - 12.9 FL    NRBC 0.0 0.0  WBC    ABSOLUTE NRBC 0.00 0.00 - 0.01 K/uL    NEUTROPHILS 76 (H) 32 - 75 %    LYMPHOCYTES 17 12 - 49 %    MONOCYTES 5 5 - 13 %    EOSINOPHILS 1 0 - 7 %    BASOPHILS 0 0 - 1 %    IMMATURE GRANULOCYTES 1 (H) 0 - 0.5 %    ABS. NEUTROPHILS 9.0 (H) 1.8 - 8.0 K/UL    ABS. LYMPHOCYTES 1.9 0.8 - 3.5 K/UL    ABS. MONOCYTES 0.5 0.0 - 1.0 K/UL    ABS. EOSINOPHILS 0.1 0.0 - 0.4 K/UL    ABS. BASOPHILS 0.0 0.0 - 0.1 K/UL    ABS. IMM.  GRANS. 0.1 (H) 0.00 - 0.04 K/UL    DF AUTOMATED     C REACTIVE PROTEIN, QT    Collection Time: 04/20/22  8:28 AM   Result Value Ref Range    C-Reactive protein 7.05 (H) 0.00 - 0.60 mg/dL   PROCALCITONIN    Collection Time: 04/20/22  8:28 AM   Result Value Ref Range    Procalcitonin 4.99 (H) 0 ng/mL   GLUCOSE, POC    Collection Time: 04/20/22 11:11 AM   Result Value Ref Range    Glucose (POC) 190 (H) 65 - 117 mg/dL    Performed by EZEQUIEL EDWARDS Imaging:    XR ABD (KUB)   Final Result   Percutaneous gastrostomy tube tip in the gastric antrum. XR CHEST PORT   Final Result   No acute pulmonary process. XR CHEST PORT   Final Result   No significant change. XR CHEST PORT   Final Result      XR CHEST PORT   Final Result   No acute cardiopulmonary abnormality. .       XR CHEST PORT   Final Result      CT HEAD WO CONT   Final Result   Chronic changes which appear stable. No acute or active intracranial process. Chronic paranasal sinus disease         XR CHEST PORT   Final Result   No acute findings. Assessment   Severe sepsis with UTI  UTI/Pseudomonas  Acute on chronic kidney disease  Hyperkalemia  Hypernatremia, improving  CVA with right-sided weakness  Anemia of chronic disease  Gangrene of the left great toe  Sacral decubitus ulcer  Acute respiratory failure  Possible aspiration  Elevated BNP  PEG tube malfunction/seen by the GI now working    Plan     Amlodipine 5 mg daily  Aspirin 81 mg daily  Lipitor 20 daily  Ferrous sulfate 300 twice a day  Gabapentin 300 mg once in the morning and at bedtime  Heparin 5000 units subcu every 8 hours  Hydralazine 50 mg 3 times a day  Lantus 22 units subcu twice a day  Metoprolol 50 mg twice a day  IV Zosyn 3.375 IV every 8 hours  Discontinue Vancomycin  Flucanazole 100 mg daily  Monitor renal function and sodium level. Pulmonary nephrology infectious disease and vascular surgical.  Amputation of the left great toe and second toe today.   Start back on gabapentin at night only  Blood sugars running high secondary D5 at 75 cc/h as per nephrology    Discussed with the patient's daughter at the bedside          Current Facility-Administered Medications:     fluconazole (DIFLUCAN) 200mg/100 mL IVPB (premix), 200 mg, IntraVENous, Q24H, Lazarus Leach, MD, Last Rate: 100 mL/hr at 04/19/22 1549, 200 mg at 04/19/22 1549    gabapentin (NEURONTIN) capsule 300 mg, 300 mg, Oral, BID, Wojciech Scruggs MD, 300 mg at 04/20/22 1011    insulin glargine (LANTUS) injection 32 Units, 32 Units, SubCUTAneous, BID, Israel Soto MD, 32 Units at 04/19/22 2234    artificial saliva (MOUTH KOTE) 1 Spray, 1 Spray, Oral, PRN, Israel Soto MD, 1 Spray at 04/15/22 1053    amLODIPine (NORVASC) tablet 5 mg, 5 mg, Oral, DAILY, Wojciech Scruggs MD, 5 mg at 04/20/22 1011    metoprolol tartrate (LOPRESSOR) tablet 50 mg, 50 mg, Oral, BID, Israel Soto MD, 50 mg at 04/20/22 1012    nitroglycerin (NITROBID) 2 % ointment 1 Inch, 1 Inch, Topical, Q6H, Jt Harvey MD, 1 Inch at 04/20/22 0537    hydrALAZINE (APRESOLINE) tablet 50 mg, 50 mg, Oral, TID, Wojciech Scruggs MD, 50 mg at 04/20/22 1011    [Held by provider] furosemide (LASIX) injection 40 mg, 40 mg, IntraVENous, DAILY, Wojciech Scruggs MD, 40 mg at 04/09/22 1054    labetaloL (NORMODYNE;TRANDATE) 20 mg/4 mL (5 mg/mL) injection 20 mg, 20 mg, IntraVENous, Q4H PRN, Wojciech Scruggs MD, 20 mg at 04/15/22 0255    hydrALAZINE (APRESOLINE) 20 mg/mL injection 20 mg, 20 mg, IntraVENous, Q6H PRN, Wojciech Scruggs MD, 20 mg at 04/19/22 2243    0.9% sodium chloride infusion 250 mL, 250 mL, IntraVENous, PRN, Martínez Crespo MD    insulin lispro (HUMALOG) injection, , SubCUTAneous, Q6H, Israel Soto MD, 4 Units at 04/20/22 0000    atorvastatin (LIPITOR) tablet 20 mg, 20 mg, Oral, QHS, Israel Soto MD, 20 mg at 04/19/22 2234    latanoprost (XALATAN) 0.005 % ophthalmic solution 1 Drop, 1 Drop, Right Eye, QPM, Israel Soto MD, 1 Drop at 04/19/22 1812    aspirin delayed-release tablet 81 mg, 81 mg, Oral, DAILY, Israel Soto MD, 81 mg at 04/19/22 0900    brimonidine (ALPHAGAN) 0.2 % ophthalmic solution 1 Drop, 1 Drop, Both Eyes, TID, Israel Soto MD, 1 Drop at 04/19/22 2240    glucose chewable tablet 16 g, 4 Tablet, Oral, PRN, Israel Soto MD    glucagon (GLUCAGEN) injection 1 mg, 1 mg, IntraMUSCular, PRN, Bebeto Moses MD, 1 mg at 04/18/22 1145    [DISCONTINUED] acetaminophen (TYLENOL) tablet 650 mg, 650 mg, Oral, Q6H PRN, 650 mg at 04/04/22 2246 **OR** acetaminophen (TYLENOL) suppository 650 mg, 650 mg, Rectal, Q6H PRN, Roel Soto MD    polyethylene glycol (MIRALAX) packet 17 g, 17 g, Oral, DAILY PRN, Roel Soto MD    ondansetron (ZOFRAN ODT) tablet 4 mg, 4 mg, Oral, Q8H PRN **OR** ondansetron (ZOFRAN) injection 4 mg, 4 mg, IntraVENous, Q6H PRN, Israel Soto MD    heparin (porcine) injection 5,000 Units, 5,000 Units, SubCUTAneous, Q8H, Israel Soto MD, 5,000 Units at 04/20/22 0000    albuterol-ipratropium (DUO-NEB) 2.5 MG-0.5 MG/3 ML, 3 mL, Nebulization, Q6H PRN, Roel Soto MD    ferrous sulfate 300 mg (60 mg iron)/5 mL oral syrup 300 mg, 300 mg, Oral, BID, Israel Soto MD, 300 mg at 04/20/22 1011    acetaminophen (TYLENOL) solution 650 mg, 650 mg, Per NG tube, Q6H PRN, Israel Soto MD, 650 mg at 04/19/22 9011    Current Outpatient Medications   Medication Instructions    acidophilus-pectin, citrus 25 million cell -100 mg tab tablet 2 Tablets, Oral, DAILY    albuterol-ipratropium (DUO-NEB) 2.5 mg-0.5 mg/3 ml nebu 3 mL, Nebulization, EVERY 6 HOURS AS NEEDED    amLODIPine (NORVASC) 5 mg, Oral, DAILY    artificial saliva (MOUTH KOTE) spra 1 Spray, Oral, 3 TIMES DAILY    aspirin delayed-release 81 mg tablet Oral, DAILY    atorvastatin (LIPITOR) 20 mg tablet TAKE 1 TABLET BY MOUTH EVERY NIGHT    brimonidine (ALPHAGAN) 0.2 % ophthalmic solution 1 Drop, Both Eyes, 3 TIMES DAILY    ferrous sulfate 325 mg, Oral, 2 TIMES DAILY    fluconazole (DIFLUCAN) 200 mg, Oral, DAILY, Fluconazole 200 mg daily for 11 days    gabapentin (NEURONTIN) 300 mg, Oral, 2 TIMES DAILY    hydrALAZINE (APRESOLINE) 50 mg, Oral, 3 TIMES DAILY    insulin glargine (LANTUS) 50 Units, SubCUTAneous, DAILY    latanoprost (XALATAN) 0.005 % ophthalmic solution 1 Drop, Right Eye, EVERY EVENING    lidocaine (XYLOCAINE) 4 % (40 mg/mL) topical solution 1 mL, Topical, AS NEEDED    metoprolol succinate (TOPROL-XL) 50 mg, Oral, 2 TIMES DAILY    metoprolol tartrate (LOPRESSOR) 50 mg, Oral, 2 TIMES DAILY         Signed By: Yg Garza MD     April 20, 2022

## 2022-04-20 NOTE — PROGRESS NOTES
PROGRESS NOTE    Patient: Samantha Rowland MRN: [id number]  SSN: [id number]    YOB: 1945  Age: 78 y.o. Sex: female      Admit Date: 4/3/2022    LOS: 16 days       Subjective     Chief Complaint   Patient presents with    Altered mental status       HPI: Patient is a 78y.o. 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 04/05   Patient is seen at bedside with daughter and nephew today. Patient is in distress due to pain and daughter notes that patient gets Neurontin TID for neuropathy daily. Otherwise, patient is still receiving enteric feeding through PEG tube and getting IVF. Patient was seen by nephrology yesterday. Recommends obtaining renal panel and continuing IVF. Patient was seen by ID yesterday. Recommends continuing IV vancomycin for urosepsis. Patient was seen by pulmonology today. Recommends PRBC transfusion. Labs indicate WBC 16.9, Hgb 6.3, Na 148, Cl 120, BUN 72, Cr 1.71, Ca 8, CRP 28.4, pro-carrie 6.93 and .       04/06   Patient is seen at bedside. Patient received 2 units of PRBC. Patient is on Zosyn. No new changes today. Patient seen by the vascular surgeon he recommend great toe amputation  And for sacral wound recommend Medihoney ointment     Labs show increasing Hgb of 9, decreasing WBC 14.7, Na 147, , BUN 55,   Cr 1.47, and CRP 21.4. CXR is unremarkable. 04/07  Patient is seen resting at bedside.  Patient received one unit of PRBC with increased Hgb of 10.6. No acute changes today. Patient is seen by nephrology who recommends free water flushes to correct hypernatremia. Labs show increasing Hgb of 10.6, WBC 15.1, pro-calcitonin 3.78, and CRP 16.1.    4/8    Patient had a rapid response this morning ABG and chest x-ray was ordered  Patient tachycardic tachypneic  ABG  pH 7.51 PCO2 31 PO2 65 bicarb 26 oxygen saturation 95% on room air    Chest x-ray shows no much changes    4/9    Patient resting the bed open eyes  On room air/breathing through the mouth    4/10    Patient open eyes not in distress breathing through her mouth  According to the nurse patient not sleep all day and all night yesterday      4/11  Patient is seen at bedside. She is awake but not in any distress. Patient is on zosyn. PEG tube in place. Urine culture positive for Pseudomonas. CXR shows no focal changes. Labs remarkable for WBC  22.3, Hgb 10.5, Na 153, , Cr 1.55 BUN 94, pro-calcitonin 1.64, and CRP 3.33.    4/12  Patient is on Vancomycin, zosyn and D5W. PEG tube in place. Spoke to daughter at bedside. She has no new complaints. Labs remarkable for:  WBC  18.9,  Cr 1.33, BUN 84,  and CRP 2.33 which are decreasing. Hgb 11.2, Na-corrected 158, , and pro-calcitonin 1.93 which are increasing. Seen by pulmonology. No new recommendations. Seen by GI. No new recommendations. Seen by nephrology. recommends IV D5W IVF to decrease hypernatremia. Seen by ID. Recommends discontinuing fluconazole, follow up blood cultures, and continuing IV zosyn and vancomycin. 4/13/2022  Patient is on Vancomycin, zosyn and D5W. PEG tube in place. Nephrology - recommends IV D5W IVF to decrease hypernatremia. Increase glargine to 25 units twice daily to correct hyperglycemia.  Once the D5W is discontinued, the dose of glargine will have to be decreased    ID - Continue IV Zosyn and Vancomycin; discontinue Vancomycin if WBC continues to decrease    Pulmonology - No new recommendations. GI - No new recommendations. CXR 4/11/2022 - No acute pulmonary process (no focal changes)     Labs remarkable for:  Blood Glucose 380  WBC  19.8  Hgb 10.3   Na-corrected 154  BUN 69  Cr 1.22  CRP 3.14  pro-calcitonin 2.28     4/14  Patient's family members are present at bedside with the patient. They notes that the patient was in distress this morning. Patient still on vancomycin and zosyn. PEG tube in place and patient is receiving enteral feedings. Seen by vascular surgeon who recommends great toe amputation. Labs remarkable for Na-corrected 154 improving, Cr 1.19 improving, pro-carrie 2.42,  and CRP 7.65.    4/15  Patient is seen grunting and restless at bedside. Daughter is at bedside. She has no new complaints. UA on 4/14 still positive for WBC>100, leukocyte esterase and grew yeast.  Labs remarkable for WBC 15.9 decreasing, Hb 9.5, Na-corrected 154, and CRP 7.99. Patient seen by ID. Recommends continuing zosyn and discontinuing vancomycin. 4/18  Patient is seen at bedside. She is awake and alert x3. She is able to say a few words this morning. She has no new complaints. She is on zosyn and fluconazole. PEG tube is clogged. Seen by vascular surgeon. Recommends great toe and second toe amputation. Awaiting for consent from daughter. Labs remarkable for WBC 11.4 decreasing, Hb 9.8, Na 145, and pro-calcitonin 2.01.    4/19  Patient was talking this morning and has no new complaints today. Patient is still receiving fluconazole. PEG tube in place and working. Labs remarkable for WBC 12.9, Hgb 9.1, and CRP 7.68. Seen by ID. Recommends transitioning to oral fluconazole 200 mg daily for 11 more days. 4/19  Patient is seen at bedside with family members. Patient is getting her left great toe and second toe amputation today. No new changes observed today.      Labs remarkable for WBC 11.6, Hgb 8.9, CRP 7.05 which are all improving. Review of Systems   Unable to perform ROS: Acuity of condition   All other systems reviewed and are negative. Unable to obtain. Objective     Visit Vitals  BP (!) 162/69   Pulse 71   Temp 98.7 °F (37.1 °C)   Resp 20   Ht 5' 7.99\" (1.727 m)   Wt 191 lb (86.6 kg)   SpO2 96%   BMI 29.05 kg/m²    O2 Flow Rate (L/min): 0 l/min O2 Device: None (Room air)    Physical Exam:   Physical Exam  Constitutional: Open eyes do not follow any commands. Tracks with eyes. HENT:   Head: Normocephalic and atraumatic. Eyes: Pupils are equal, round, and reactive to light. EOM are normal.   Cardiovascular: Normal rate, regular rhythm and normal heart sounds. Pulmonary/Chest: Decreased breath sound   abdominal: Soft. Bowel sounds are normal. There is no abdominal tenderness. There is no rebound and no guarding. Musculoskeletal: Normal range of motion. Neurological: Open eyes do not follow any, right-sided weakness  PEG tube in place. Intake & Output:  Current Shift: No intake/output data recorded. Last three shifts: 04/18 1901 - 04/20 0700  In: 2269   Out: 2925 [Urine:2925]    Lab/Data Review:  Lab results reviewed. For significant abnormal values and values requiring intervention, see assessment and plan.        24 Hour Results:    Recent Results (from the past 24 hour(s))   GLUCOSE, POC    Collection Time: 04/19/22 12:06 PM   Result Value Ref Range    Glucose (POC) 333 (H) 65 - 117 mg/dL    Performed by Lucio Pod, POC    Collection Time: 04/19/22  5:12 PM   Result Value Ref Range    Glucose (POC) 257 (H) 65 - 117 mg/dL    Performed by Shila Navarro    URINALYSIS W/MICROSCOPIC    Collection Time: 04/19/22  5:45 PM   Result Value Ref Range    Color Yellow/Straw      Appearance Turbid (A) Clear      Specific gravity 1.005 1.003 - 1.030      pH (UA) 7.0 5.0 - 8.0      Protein Negative Negative mg/dL    Glucose >300 (A) Negative mg/dL    Ketone Negative Negative mg/dL    Bilirubin Negative Negative      Blood Negative Negative      Urobilinogen 0.1 0.1 - 1.0 EU/dL    Nitrites Negative Negative      Leukocyte Esterase Large (A) Negative      WBC 20-50 0 - 4 /hpf    RBC >100 (H) 0 - 5 /hpf    Bacteria Negative Negative /hpf    Mucus Trace /lpf    PRESUMPTIVE YEAST Present      Budding yeast Present (A) Negative     GLUCOSE, POC    Collection Time: 04/20/22 12:53 AM   Result Value Ref Range    Glucose (POC) 230 (H) 65 - 117 mg/dL    Performed by Jatinder Horse    GLUCOSE, POC    Collection Time: 04/20/22  5:21 AM   Result Value Ref Range    Glucose (POC) 225 (H) 65 - 117 mg/dL    Performed by Jatinder Horse    GLUCOSE, POC    Collection Time: 04/20/22  7:40 AM   Result Value Ref Range    Glucose (POC) 225 (H) 65 - 117 mg/dL    Performed by EZEQUIEL EDWARDS    CBC WITH AUTOMATED DIFF    Collection Time: 04/20/22  8:28 AM   Result Value Ref Range    WBC 11.6 (H) 3.6 - 11.0 K/uL    RBC 3.17 (L) 3.80 - 5.20 M/uL    HGB 8.9 (L) 11.5 - 16.0 g/dL    HCT 28.1 (L) 35.0 - 47.0 %    MCV 88.6 80.0 - 99.0 FL    MCH 28.1 26.0 - 34.0 PG    MCHC 31.7 30.0 - 36.5 g/dL    RDW 16.4 (H) 11.5 - 14.5 %    PLATELET 240 094 - 511 K/uL    MPV 12.5 8.9 - 12.9 FL    NRBC 0.0 0.0  WBC    ABSOLUTE NRBC 0.00 0.00 - 0.01 K/uL    NEUTROPHILS 76 (H) 32 - 75 %    LYMPHOCYTES 17 12 - 49 %    MONOCYTES 5 5 - 13 %    EOSINOPHILS 1 0 - 7 %    BASOPHILS 0 0 - 1 %    IMMATURE GRANULOCYTES 1 (H) 0 - 0.5 %    ABS. NEUTROPHILS 9.0 (H) 1.8 - 8.0 K/UL    ABS. LYMPHOCYTES 1.9 0.8 - 3.5 K/UL    ABS. MONOCYTES 0.5 0.0 - 1.0 K/UL    ABS. EOSINOPHILS 0.1 0.0 - 0.4 K/UL    ABS. BASOPHILS 0.0 0.0 - 0.1 K/UL    ABS. IMM.  GRANS. 0.1 (H) 0.00 - 0.04 K/UL    DF AUTOMATED     C REACTIVE PROTEIN, QT    Collection Time: 04/20/22  8:28 AM   Result Value Ref Range    C-Reactive protein 7.05 (H) 0.00 - 0.60 mg/dL   PROCALCITONIN    Collection Time: 04/20/22  8:28 AM   Result Value Ref Range    Procalcitonin 4.99 (H) 0 ng/mL Imaging:    XR ABD (KUB)   Final Result   Percutaneous gastrostomy tube tip in the gastric antrum. XR CHEST PORT   Final Result   No acute pulmonary process. XR CHEST PORT   Final Result   No significant change. XR CHEST PORT   Final Result      XR CHEST PORT   Final Result   No acute cardiopulmonary abnormality. .       XR CHEST PORT   Final Result      CT HEAD WO CONT   Final Result   Chronic changes which appear stable. No acute or active intracranial process. Chronic paranasal sinus disease         XR CHEST PORT   Final Result   No acute findings. Assessment   Severe sepsis with UTI  UTI/Pseudomonas  Acute on chronic kidney disease  Hyperkalemia  Hypernatremia, improving  CVA with right-sided weakness  Anemia of chronic disease  Gangrene of the left great toe  Sacral decubitus ulcer  Acute respiratory failure  Possible aspiration  Elevated BNP  PEG tube malfunction/seen by the GI now working    Plan     Amlodipine 5 mg daily  Aspirin 81 mg daily  Lipitor 20 daily  Ferrous sulfate 300 twice a day  Gabapentin 300 mg once in the morning and at bedtime  Heparin 5000 units subcu every 8 hours  Hydralazine 50 mg 3 times a day  Lantus 22 units subcu twice a day  Metoprolol 50 mg twice a day  IV Zosyn 3.375 IV every 8 hours  Discontinue Vancomycin  Flucanazole 100 mg daily  Monitor renal function and sodium level. Pulmonary nephrology infectious disease and vascular surgical.  Amputation of the left great toe and second toe today.   Start back on gabapentin at night only  Blood sugars running high secondary D5 at 75 cc/h as per nephrology    Discussed with the patient's daughter at the bedside          Current Facility-Administered Medications:     fluconazole (DIFLUCAN) 200mg/100 mL IVPB (premix), 200 mg, IntraVENous, Q24H, Mariella Goyal MD, Last Rate: 100 mL/hr at 04/19/22 1549, 200 mg at 04/19/22 1549    gabapentin (NEURONTIN) capsule 300 mg, 300 mg, Oral, BID, Wojciech Scruggs MD, 300 mg at 04/20/22 1011    insulin glargine (LANTUS) injection 32 Units, 32 Units, SubCUTAneous, BID, Israel Soto MD, 32 Units at 04/19/22 2234    artificial saliva (MOUTH KOTE) 1 Spray, 1 Spray, Oral, PRN, Israel Soto MD, 1 Spray at 04/15/22 1053    amLODIPine (NORVASC) tablet 5 mg, 5 mg, Oral, DAILY, Wojciech Scruggs MD, 5 mg at 04/20/22 1011    metoprolol tartrate (LOPRESSOR) tablet 50 mg, 50 mg, Oral, BID, Israel Soto MD, 50 mg at 04/20/22 1012    nitroglycerin (NITROBID) 2 % ointment 1 Inch, 1 Inch, Topical, Q6H, Jt Harvey MD, 1 Inch at 04/20/22 0537    hydrALAZINE (APRESOLINE) tablet 50 mg, 50 mg, Oral, TID, Wojciech Scruggs MD, 50 mg at 04/20/22 1011    [Held by provider] furosemide (LASIX) injection 40 mg, 40 mg, IntraVENous, DAILY, Wojciech Scruggs MD, 40 mg at 04/09/22 1054    labetaloL (NORMODYNE;TRANDATE) 20 mg/4 mL (5 mg/mL) injection 20 mg, 20 mg, IntraVENous, Q4H PRN, Wojciech Scruggs MD, 20 mg at 04/15/22 0255    hydrALAZINE (APRESOLINE) 20 mg/mL injection 20 mg, 20 mg, IntraVENous, Q6H PRN, Wojciech Scruggs MD, 20 mg at 04/19/22 2243    0.9% sodium chloride infusion 250 mL, 250 mL, IntraVENous, PRN, Martínez Crespo MD    insulin lispro (HUMALOG) injection, , SubCUTAneous, Q6H, Israel Soto MD, 4 Units at 04/20/22 0000    atorvastatin (LIPITOR) tablet 20 mg, 20 mg, Oral, QHS, Israel Soto MD, 20 mg at 04/19/22 2234    latanoprost (XALATAN) 0.005 % ophthalmic solution 1 Drop, 1 Drop, Right Eye, QPM, Israel Soto MD, 1 Drop at 04/19/22 1812    aspirin delayed-release tablet 81 mg, 81 mg, Oral, DAILY, Israel Soto MD, 81 mg at 04/19/22 0900    brimonidine (ALPHAGAN) 0.2 % ophthalmic solution 1 Drop, 1 Drop, Both Eyes, TID, Israel Soto MD, 1 Drop at 04/19/22 2240    glucose chewable tablet 16 g, 4 Tablet, Oral, PRN, Israel Soto MD    glucagon (GLUCAGEN) injection 1 mg, 1 mg, IntraMUSCular, PRN, Diana Barahona MD, 1 mg at 04/18/22 1145    [DISCONTINUED] acetaminophen (TYLENOL) tablet 650 mg, 650 mg, Oral, Q6H PRN, 650 mg at 04/04/22 2246 **OR** acetaminophen (TYLENOL) suppository 650 mg, 650 mg, Rectal, Q6H PRN, Fany Soto MD    polyethylene glycol (MIRALAX) packet 17 g, 17 g, Oral, DAILY PRN, Fany Soto MD    ondansetron (ZOFRAN ODT) tablet 4 mg, 4 mg, Oral, Q8H PRN **OR** ondansetron (ZOFRAN) injection 4 mg, 4 mg, IntraVENous, Q6H PRN, Israel Soto MD    heparin (porcine) injection 5,000 Units, 5,000 Units, SubCUTAneous, Q8H, Israel Soto MD, 5,000 Units at 04/20/22 0000    albuterol-ipratropium (DUO-NEB) 2.5 MG-0.5 MG/3 ML, 3 mL, Nebulization, Q6H PRN, Fany Soto MD    ferrous sulfate 300 mg (60 mg iron)/5 mL oral syrup 300 mg, 300 mg, Oral, BID, Israel Soto MD, 300 mg at 04/20/22 1011    acetaminophen (TYLENOL) solution 650 mg, 650 mg, Per NG tube, Q6H PRN, Israel Soto MD, 650 mg at 04/19/22 5371    Current Outpatient Medications   Medication Instructions    acidophilus-pectin, citrus 25 million cell -100 mg tab tablet 2 Tablets, Oral, DAILY    albuterol-ipratropium (DUO-NEB) 2.5 mg-0.5 mg/3 ml nebu 3 mL, Nebulization, EVERY 6 HOURS AS NEEDED    amLODIPine (NORVASC) 5 mg, Oral, DAILY    artificial saliva (MOUTH KOTE) spra 1 Spray, Oral, 3 TIMES DAILY    aspirin delayed-release 81 mg tablet Oral, DAILY    atorvastatin (LIPITOR) 20 mg tablet TAKE 1 TABLET BY MOUTH EVERY NIGHT    brimonidine (ALPHAGAN) 0.2 % ophthalmic solution 1 Drop, Both Eyes, 3 TIMES DAILY    ferrous sulfate 325 mg, Oral, 2 TIMES DAILY    fluconazole (DIFLUCAN) 200 mg, Oral, DAILY, Fluconazole 200 mg daily for 11 days    gabapentin (NEURONTIN) 300 mg, Oral, 2 TIMES DAILY    hydrALAZINE (APRESOLINE) 50 mg, Oral, 3 TIMES DAILY    insulin glargine (LANTUS) 50 Units, SubCUTAneous, DAILY    latanoprost (XALATAN) 0.005 % ophthalmic solution 1 Drop, Right Eye, EVERY EVENING    lidocaine (XYLOCAINE) 4 % (40 mg/mL) topical solution 1 mL, Topical, AS NEEDED    metoprolol succinate (TOPROL-XL) 50 mg, Oral, 2 TIMES DAILY    metoprolol tartrate (LOPRESSOR) 50 mg, Oral, 2 TIMES DAILY         Signed By: Kellie Montiel     April 20, 2022

## 2022-04-20 NOTE — PERIOP NOTES
TRANSFER - OUT REPORT:    Verbal report given to [facility], RN(name) on t[employer]ella Police  being transferred to W(unit) for routine post - op       Report consisted of patients Situation, Background, Assessment and   Recommendations(SBAR). Information from the following report(s) SBAR, Procedure Summary, Intake/Output, MAR, Recent Results and Cardiac Rhythm SR was reviewed with the receiving nurse. Opportunity for questions and clarification was provided.       Patient transported with:   Monitor  Registered Nurse

## 2022-04-20 NOTE — PROGRESS NOTES
SHIRLEY was informed today the patient is planned to have a toe amputation today and is not yet medically stable for discharge. SHIRLEY reached out to the patient's daughter, Jennifer Deng, at [phone] to discuss her dc plan and follow up regarding additional SNF choices due to concern that insurance may not authorize SNF just for PEG and wound care. [facility] does not have any LTC beds and therefore we need additional choices should the patient have to go under her LTC medicaid benefit. Ms. Jed Tobias was very upset that we were discussing discharge and stated the patient is not even close to that. SHIRLEY explained purpose of planning ahead to a have a safe and appropriate discharge plan in place. She stated she needed a new SNF list and requested CM provide to her sister in the patient's room. CM provided it. She will review and provide additional choices tomorrow. Meanwhile CM requested [facility] start auth. They do not want to start auth without the surgical note from the amputation as they do not feel they have enough information to obtain auth. CM will send as soon as available. no

## 2022-04-20 NOTE — PROGRESS NOTES
Patient examined at this afternoon. Patient appears to be doing better patient has been afebrile. Patient had a previous left leg angiogram and angioplasty done prior to his hospital admission and I believe at [facility].    Patient left foot fifth toe has gangrenous extending to fifth metatarsal level and fourth toe has become now gangrene. I left a message to patient's daughter. Family has not got back to me on wound debridement and toe amputation. I will try to organize this procedure tomorrow. And try to work on getting surgical consent from family.

## 2022-04-20 NOTE — PROGRESS NOTES
Progress Note    Patient: Lenin Patel MRN: [id number]  SSN: [id number]    YOB: 1945  Age: 78 y.o. Sex: female      Admit Date: 4/3/2022    LOS: 16 days     Subjective:   Patient followed for sepsis with UTI secondary to Pseudomonas sensitive to Zosyn, which has been completed and Zosyn discontinued. Still on Fluconazole for Candida UTI. Patient remains nonverbal but appeared to be resting comfortably. She remains afebrile and WBC decreasing today along with procal and CRP. She may be undergoing left great toe and second toe amputation. Daughter at bedside confirmed this. Objective:     Vitals:    04/19/22 1459 04/19/22 2041 04/19/22 2307 04/20/22 0816   BP: (!) 149/69 (!) 161/65 (!) 162/63 (!) 162/69   Pulse: 69 77  71   Resp: 20 20  20   Temp: 98.5 °F (36.9 °C) 98.7 °F (37.1 °C)  98.7 °F (37.1 °C)   SpO2: 100% 100%  96%   Weight:       Height:            Intake and Output:  Current Shift: No intake/output data recorded. Last three shifts: 04/18 1901 - 04/20 0700  In: 2269   Out: 2925 [Urine:2925]    Physical Exam:   Vitals and nursing note reviewed. Constitutional:       General: She is not in acute distress. Appearance: She is ill-appearing. HENT: eyes closed, Room Air   Cardiovascular:      Rate and Rhythm: Normal rate and regular rhythm. Heart sounds: No murmur heard. Pulmonary:      Effort: Pulmonary effort is normal.      Breath sounds: Normal breath sounds. Abdominal:      General: Bowel sounds are normal.      Palpations: Abdomen is soft. Tenderness: There is no abdominal tenderness. Comments: PEG tube in place, site unremarkable  Genitourinary:     Comments: Bell catheter with moderate urine sediment  Flexiseal with empty bag at this time  Musculoskeletal:      Right lower leg: No edema. Left lower leg: No edema.       Comments: Left great toe with dry gangrene, dark and shrunken, second toe gangrene  Skin: Stage 2 sacral wound     Findings: No rash. Neurological:      Mental Status: She is alert. Comments: Unable to assess   Psychiatric:      Comments: Unable to assess      Lab/Data Review:     WBC 11,600  UA with WBC >100, budding yeasts    Procal 4.99 <5.34 <1.34 <1.20 <1.55 <2.01 <2.42 <6.93  CRP 7.05 <7.68 <7.99 <7.79 <5.89 <6.55 3.44 <16.10 <21.40 <28.40    Blood cultures (4/3) No growth FINAL  Blood cultures (4/10)  No growth FINAL   Urine culture (4/7) >100,000 cfu/ml Pseudomonas aeruginosa   Urine culture (4/14) >100,000 cfu/ml mixed urogenital yasmine    CXR (4/11) No acute pulmonary process    Assessment:     Active Problems:    Hyperkalemia (4/4/2022)      UTI (urinary tract infection) (4/4/2022)      Sepsis ([facility] [address] (4/4/2022)      RICARDO (acute kidney injury) ([facility] [address] (4/4/2022)      AMS (altered mental status) (4/4/2022)    1. Sepsis with fever and leukocytosis, elevated procal and CRP, resolving  2. UTI with marked pyuria and bacteriuria, secondary to Pseudomonas aeruginosa, status post 14 days IV Zosyn  3. Altered mentals status, multifactorial including sepsis  4. Uncontrolled diabetes mellitus with hyperglycemia  5. Chronic maxillary sinusitis  6. ASCVD with prior stroke  7. Hepatitis C antibody positive, HCV RNA negative  8. 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 Sacral wound, Stage 2, no evidence of gross infection  10. Acute hypoxic respiratory failure, resolved  11. Diarrhea, presumed secondary to tube feeding  12. Candida UTI, presumptive, with marked pyuria and yeasts, Day #4 IV Fluconazole    Comment:  WBC decreased today with decreasing procal and CRP. Still concerned about gangrene left foot. Yeasts still present but pyuria has decreased suggesting early clinical response. Fluconazole resistance is possible. Plan:   1. Continue Fluconazole, reasonable to transition to oral Fluconazole 200 mg daily for 10 more days  2. Culture sacral wound at next dressing change  3.  Possible amputation left foot  4.  In am, repeat CBC, procal and CRP        Signed By: Juan Ahmadi MD     April 20, 2022

## 2022-04-21 NOTE — ANESTHESIA PREPROCEDURE EVALUATION
Relevant Problems   NEUROLOGY   (+) CVA (cerebral vascular accident) ([facility] [address]      CARDIOVASCULAR   (+) HTN (hypertension)   (+) PAD (peripheral artery disease) (HCC)      RENAL FAILURE   (+) RICARDO (acute kidney injury) ([facility] [address]      ENDOCRINE   (+) DM type 2 causing vascular disease (HCC)   (+) Type 2 diabetes mellitus with nephropathy (HCC)      HEMATOLOGY   (+) Acute osteomyelitis of ankle or foot (HCC)       Anesthetic History   No history of anesthetic complications            Review of Systems / Medical History  Patient summary reviewed, nursing notes reviewed and pertinent labs reviewed    Pulmonary  Within defined limits                 Neuro/Psych       CVA  TIA    Comments: Metabolic Encephalopathy  Peripheral Neuropathy Cardiovascular    Hypertension          Past MI, CAD, PAD and hyperlipidemia      Comments: 4/2022 TTE:    Interpretation Summary         Left Ventricle: Left ventricle size is normal. Normal wall thickness. Normal wall motion. Low normal left ventricular systolic function with a visually estimated EF of 50 - 55%.   Mitral Valve: Mild transvalvular regurgitation.   Tricuspid Valve: Moderate transvalvular regurgitation.      GI/Hepatic/Renal       Hepatitis: type C  Renal disease: CRI       Endo/Other    Diabetes: type 2, using insulin    Blood dyscrasia and anemia     Other Findings            Past Medical History:   Diagnosis Date    Diabetes mellitus ([facility] [address]     HTN (hypertension)     Hyperlipidemia     Neuropathy     PAD (peripheral artery disease) (HCC)     PCI    Sciatica        Past Surgical History:   Procedure Laterality Date    HX AMPUTATION Right     great toe    HX CERVICAL FUSION      HX OTHER SURGICAL Bilateral     Stent placement    HX OTHER SURGICAL      HX VASCULAR STENT Bilateral     2 in right 1 in left    HX VASCULAR STENT Bilateral        Current Outpatient Medications   Medication Instructions    acidophilus-pectin, citrus 25 million cell -100 mg tab tablet 2 Tablets, Oral, DAILY    albuterol-ipratropium (DUO-NEB) 2.5 mg-0.5 mg/3 ml nebu 3 mL, Nebulization, EVERY 6 HOURS AS NEEDED    amLODIPine (NORVASC) 5 mg, Oral, DAILY    artificial saliva (MOUTH KOTE) spra 1 Spray, Oral, 3 TIMES DAILY    aspirin delayed-release 81 mg tablet Oral, DAILY    atorvastatin (LIPITOR) 20 mg tablet TAKE 1 TABLET BY MOUTH EVERY NIGHT    brimonidine (ALPHAGAN) 0.2 % ophthalmic solution 1 Drop, Both Eyes, 3 TIMES DAILY    ferrous sulfate 325 mg, Oral, 2 TIMES DAILY    fluconazole (DIFLUCAN) 200 mg, Oral, DAILY, Fluconazole 200 mg daily for 11 days    gabapentin (NEURONTIN) 300 mg, Oral, 2 TIMES DAILY    hydrALAZINE (APRESOLINE) 50 mg, Oral, 3 TIMES DAILY    insulin glargine (LANTUS) 50 Units, SubCUTAneous, DAILY    latanoprost (XALATAN) 0.005 % ophthalmic solution 1 Drop, Right Eye, EVERY EVENING    lidocaine (XYLOCAINE) 4 % (40 mg/mL) topical solution 1 mL, Topical, AS NEEDED    metoprolol succinate (TOPROL-XL) 50 mg, Oral, 2 TIMES DAILY    metoprolol tartrate (LOPRESSOR) 50 mg, Oral, 2 TIMES DAILY       No current facility-administered medications for this visit. Current Outpatient Medications   Medication Sig    albuterol-ipratropium (DUO-NEB) 2.5 mg-0.5 mg/3 ml nebu 3 mL by Nebulization route every six (6) hours as needed for Wheezing or Shortness of Breath.  hydrALAZINE (APRESOLINE) 50 mg tablet Take 1 Tablet by mouth three (3) times daily.  metoprolol tartrate (LOPRESSOR) 50 mg tablet Take 1 Tablet by mouth two (2) times a day.  fluconazole (DIFLUCAN) 100 mg tablet Take 2 Tablets by mouth daily for 7 days.  Fluconazole 200 mg daily for 11 days     Facility-Administered Medications Ordered in Other Visits   Medication Dose Route Frequency    sodium polystyrene (KAYEXALATE) 15 gram/60 mL oral suspension 30 g  30 g Oral NOW    traMADoL (ULTRAM) tablet 50 mg  50 mg Oral Q6H PRN    fluconazole (DIFLUCAN) 200mg/100 mL IVPB (premix)  200 mg IntraVENous Q24H    gabapentin (NEURONTIN) capsule 300 mg  300 mg Oral BID    insulin glargine (LANTUS) injection 32 Units  32 Units SubCUTAneous BID    artificial saliva (MOUTH KOTE) 1 Spray  1 Spray Oral PRN    amLODIPine (NORVASC) tablet 5 mg  5 mg Oral DAILY    metoprolol tartrate (LOPRESSOR) tablet 50 mg  50 mg Oral BID    nitroglycerin (NITROBID) 2 % ointment 1 Inch  1 Inch Topical Q6H    hydrALAZINE (APRESOLINE) tablet 50 mg  50 mg Oral TID    [Held by provider] furosemide (LASIX) injection 40 mg  40 mg IntraVENous DAILY    labetaloL (NORMODYNE;TRANDATE) 20 mg/4 mL (5 mg/mL) injection 20 mg  20 mg IntraVENous Q4H PRN    hydrALAZINE (APRESOLINE) 20 mg/mL injection 20 mg  20 mg IntraVENous Q6H PRN    0.9% sodium chloride infusion 250 mL  250 mL IntraVENous PRN    insulin lispro (HUMALOG) injection   SubCUTAneous Q6H    atorvastatin (LIPITOR) tablet 20 mg  20 mg Oral QHS    latanoprost (XALATAN) 0.005 % ophthalmic solution 1 Drop  1 Drop Right Eye QPM    aspirin delayed-release tablet 81 mg  81 mg Oral DAILY    brimonidine (ALPHAGAN) 0.2 % ophthalmic solution 1 Drop  1 Drop Both Eyes TID    glucose chewable tablet 16 g  4 Tablet Oral PRN    glucagon (GLUCAGEN) injection 1 mg  1 mg IntraMUSCular PRN    acetaminophen (TYLENOL) suppository 650 mg  650 mg Rectal Q6H PRN    polyethylene glycol (MIRALAX) packet 17 g  17 g Oral DAILY PRN    ondansetron (ZOFRAN ODT) tablet 4 mg  4 mg Oral Q8H PRN    Or    ondansetron (ZOFRAN) injection 4 mg  4 mg IntraVENous Q6H PRN    heparin (porcine) injection 5,000 Units  5,000 Units SubCUTAneous Q8H    albuterol-ipratropium (DUO-NEB) 2.5 MG-0.5 MG/3 ML  3 mL Nebulization Q6H PRN    ferrous sulfate 300 mg (60 mg iron)/5 mL oral syrup 300 mg  300 mg Oral BID    acetaminophen (TYLENOL) solution 650 mg  650 mg Per NG tube Q6H PRN       No data found.     Lab Results   Component Value Date/Time    WBC 13.3 (H) 04/21/2022 07:04 AM    HGB 9.4 (L) 04/21/2022 07:04 AM    HCT 29.6 (L) 04/21/2022 07:04 AM    PLATELET 643 69/63/7742 07:04 AM    MCV 88.9 04/21/2022 07:04 AM     Lab Results   Component Value Date/Time    Sodium 138 04/21/2022 07:04 AM    Potassium 5.8 (H) 04/21/2022 07:04 AM    Chloride 107 04/21/2022 07:04 AM    CO2 27 04/21/2022 07:04 AM    Anion gap 4 (L) 04/21/2022 07:04 AM    Glucose 313 (H) 04/21/2022 07:04 AM    BUN 26 (H) 04/21/2022 07:04 AM    Creatinine 0.78 04/21/2022 07:04 AM    BUN/Creatinine ratio 33 (H) 04/21/2022 07:04 AM    GFR est AA >60 04/21/2022 07:04 AM    GFR est non-AA >60 04/21/2022 07:04 AM    Calcium 8.3 (L) 04/21/2022 07:04 AM     No results found for: APTT, PTP, INR, INREXT, INREXT  Lab Results   Component Value Date/Time    Glucose 313 (H) 04/21/2022 07:04 AM    Glucose (POC) 302 (H) 04/21/2022 11:32 AM     Physical Exam    Airway  Mallampati: III  TM Distance: 4 - 6 cm  Neck ROM: normal range of motion   Mouth opening: Normal     Cardiovascular    Rhythm: regular  Rate: normal         Dental    Dentition: Poor dentition     Pulmonary  Breath sounds clear to auscultation               Abdominal  GI exam deferred       Other Findings            Anesthetic Plan    ASA: 3  Anesthesia type: general          Induction: Intravenous  Anesthetic plan and risks discussed with: Patient and Family

## 2022-04-21 NOTE — PROGRESS NOTES
[address] [place] [address] informed  insurance denied auth for SNF. Peer to Peer was offered and completed and the denial was upheld. CM contacted patient's daughter, Jp De La Vega, at [phone] and informed her of this. Patient is unable to use her skilled benefit will have to use her Medicaid LTC benefit. Daughter stated she received the list of nursing facilities yesterday but Is still reviewing. CM explained we would need additional choices tomorrow by morning. She stated she would review and provide tomorrow morning. CM will follow up.

## 2022-04-21 NOTE — PROGRESS NOTES
Progress Note    Patient: Chele Ng MRN: [id number]  SSN: [id number]    YOB: 1945  Age: 78 y.o. Sex: female      Admit Date: 4/3/2022    LOS: 17 days     Subjective:   Patient followed for sepsis with UTI secondary to Pseudomonas sensitive to Zosyn, which has been completed and Zosyn discontinued. Still on Fluconazole for Candida UTI. Patient remains nonverbal but appeared to be resting comfortably. Daughter states that she had been talking earlier. She remains afebrile but WBC has increased again along with CRP. She was seen by Dr. Darek Villafana and has undergone amputation of left great and second toe and sacral wound debridement. Diverting colostomy recommended but not felt to be practical at this time. Objective:     Vitals:    04/21/22 1708 04/21/22 1710 04/21/22 1715 04/21/22 1720   BP: (!) 165/58 (!) 167/128 (!) 165/58 (!) 170/59   Pulse: 76 75 74 74   Resp: 11 16 16 16   Temp:  97.3 °F (36.3 °C)     SpO2: 100% 99% 99% 99%   Weight:       Height:            Intake and Output:  Current Shift: 04/21 0701 - 04/21 1900  In: 250 [I.V.:250]  Out: -   Last three shifts: 04/19 1901 - 04/21 0700  In: 200 [I.V.:200]  Out: 2800 [Urine:2800]    Physical Exam:   Vitals and nursing note reviewed. Constitutional:       General: She is not in acute distress. Appearance: She is ill-appearing. HENT: eyes closed, Room Air   Cardiovascular:      Rate and Rhythm: Normal rate and regular rhythm. Heart sounds: No murmur heard. Pulmonary:      Effort: Pulmonary effort is normal.      Breath sounds: Normal breath sounds. Abdominal:      General: Bowel sounds are normal.      Palpations: Abdomen is soft. Tenderness: There is no abdominal tenderness. Comments: PEG tube in place, site unremarkable  Genitourinary:     Comments: Bell catheter with moderate urine sediment  Flexiseal with empty bag at this time  Musculoskeletal:      Right lower leg: No edema. Left lower leg: No edema. Comments: Left foot with gauze dressing, not removed at this time  Skin: Stage 3 sacral wound not visualized today     Findings: No rash. Neurological:      Mental Status: She is alert. Comments: Unable to assess   Psychiatric:      Comments: Unable to assess      Lab/Data Review:     WBC 13,300  UA with WBC >100, budding yeasts    Procal 2.91 <4.99 <5.34 <1.34 <1.20 <1.55 <2.01 <2.42 <6.93  CRP 8.28 < 7.05 <7.68 <7.99 <7.79 <5.89 <6.55 3.44 <16.10 <21.40 <28.40    Blood cultures (4/3) No growth FINAL  Blood cultures (4/10)  No growth FINAL   Urine culture (4/7) >100,000 cfu/ml Pseudomonas aeruginosa   Urine culture (4/14) >100,000 cfu/ml mixed urogenital yasmnie   Wound culture sacrum (4/20) Pending  Tissue culture sacrum (4/20) Pending     CXR (4/11) No acute pulmonary process    Assessment:     Active Problems:    Hyperkalemia (4/4/2022)      UTI (urinary tract infection) (4/4/2022)      Sepsis ([address] (4/4/2022)      RICARDO (acute kidney injury) ([facility] [address] (4/4/2022)      AMS (altered mental status) (4/4/2022)    1. Sepsis with fever and leukocytosis, elevated procal and CRP, resolving  2. UTI with marked pyuria and bacteriuria, secondary to Pseudomonas aeruginosa, status post 14 days IV Zosyn  3. Altered mentals status, multifactorial including sepsis  4. Uncontrolled diabetes mellitus with hyperglycemia  5. Chronic maxillary sinusitis  6. ASCVD with prior stroke  7. Hepatitis C antibody positive, HCV RNA negative  8. 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 Acute hypoxic respiratory failure, resolved  11. Diarrhea, presumed secondary to tube feeding  12. Candida UTI, presumptive, with marked pyuria and yeasts, Day #5 IV Fluconazole    Comment:  WBC decreased today with decreasing procal and CRP. Still concerned about gangrene left foot.  Yeasts still present but pyuria has decreased suggesting early clinical response. Fluconazole resistance is possible. Plan:   1. Continue Fluconazole, reasonable to transition to oral Fluconazole 200 mg daily for 9 more days  2. Follow-up sacral cultures  3. Start IV Meropenem pending wound culture results (Any infection would have developed while on Zosyn which was just discontinued).   4. In am, repeat CBC, procal and CRP        Signed By: Sandrita Rosales MD     April 21, 2022

## 2022-04-21 NOTE — ROUTINE PROCESS
TRANSFER - OUT REPORT:    Verbal Jinny Donna report given to [employer] on Nev[facility]  being transferred to [phone]       Report consisted of patients Situation, Background, Assessment and   Recommendations(SBAR). Information from the following report(s)   was reviewed with the receiving nurse. Opportunity for questions and clarification was provided.

## 2022-04-21 NOTE — PROGRESS NOTES
PROGRESS NOTE    Patient: Raissa Husain MRN: [id number]  SSN: [id number]    YOB: 1945  Age: 78 y.o. Sex: female      Admit Date: 4/3/2022    LOS: 17 days       Subjective     Chief Complaint   Patient presents with    Altered mental status       HPI: Patient is a 78y.o. 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 04/05   Patient is seen at bedside with daughter and nephew today. Patient is in distress due to pain and daughter notes that patient gets Neurontin TID for neuropathy daily. Otherwise, patient is still receiving enteric feeding through PEG tube and getting IVF. Patient was seen by nephrology yesterday. Recommends obtaining renal panel and continuing IVF. Patient was seen by ID yesterday. Recommends continuing IV vancomycin for urosepsis. Patient was seen by pulmonology today. Recommends PRBC transfusion. Labs indicate WBC 16.9, Hgb 6.3, Na 148, Cl 120, BUN 72, Cr 1.71, Ca 8, CRP 28.4, pro-carrie 6.93 and .       04/06   Patient is seen at bedside. Patient received 2 units of PRBC. Patient is on Zosyn. No new changes today. Patient seen by the vascular surgeon he recommend great toe amputation  And for sacral wound recommend Medihoney ointment     Labs show increasing Hgb of 9, decreasing WBC 14.7, Na 147, , BUN 55,   Cr 1.47, and CRP 21.4. CXR is unremarkable. 04/07  Patient is seen resting at bedside.  Patient received one unit of PRBC with increased Hgb of 10.6. No acute changes today. Patient is seen by nephrology who recommends free water flushes to correct hypernatremia. Labs show increasing Hgb of 10.6, WBC 15.1, pro-calcitonin 3.78, and CRP 16.1.    4/8    Patient had a rapid response this morning ABG and chest x-ray was ordered  Patient tachycardic tachypneic  ABG  pH 7.51 PCO2 31 PO2 65 bicarb 26 oxygen saturation 95% on room air    Chest x-ray shows no much changes    4/9    Patient resting the bed open eyes  On room air/breathing through the mouth    4/10    Patient open eyes not in distress breathing through her mouth  According to the nurse patient not sleep all day and all night yesterday      4/11  Patient is seen at bedside. She is awake but not in any distress. Patient is on zosyn. PEG tube in place. Urine culture positive for Pseudomonas. CXR shows no focal changes. Labs remarkable for WBC  22.3, Hgb 10.5, Na 153, , Cr 1.55 BUN 94, pro-calcitonin 1.64, and CRP 3.33.    4/12  Patient is on Vancomycin, zosyn and D5W. PEG tube in place. Spoke to daughter at bedside. She has no new complaints. Labs remarkable for:  WBC  18.9,  Cr 1.33, BUN 84,  and CRP 2.33 which are decreasing. Hgb 11.2, Na-corrected 158, , and pro-calcitonin 1.93 which are increasing. Seen by pulmonology. No new recommendations. Seen by GI. No new recommendations. Seen by nephrology. recommends IV D5W IVF to decrease hypernatremia. Seen by ID. Recommends discontinuing fluconazole, follow up blood cultures, and continuing IV zosyn and vancomycin. 4/13/2022  Patient is on Vancomycin, zosyn and D5W. PEG tube in place. Nephrology - recommends IV D5W IVF to decrease hypernatremia. Increase glargine to 25 units twice daily to correct hyperglycemia.  Once the D5W is discontinued, the dose of glargine will have to be decreased    ID - Continue IV Zosyn and Vancomycin; discontinue Vancomycin if WBC continues to decrease    Pulmonology - No new recommendations. GI - No new recommendations. CXR 4/11/2022 - No acute pulmonary process (no focal changes)     Labs remarkable for:  Blood Glucose 380  WBC  19.8  Hgb 10.3   Na-corrected 154  BUN 69  Cr 1.22  CRP 3.14  pro-calcitonin 2.28     4/14  Patient's family members are present at bedside with the patient. They notes that the patient was in distress this morning. Patient still on vancomycin and zosyn. PEG tube in place and patient is receiving enteral feedings. Seen by vascular surgeon who recommends great toe amputation. Labs remarkable for Na-corrected 154 improving, Cr 1.19 improving, pro-carrie 2.42,  and CRP 7.65.    4/15  Patient is seen grunting and restless at bedside. Daughter is at bedside. She has no new complaints. UA on 4/14 still positive for WBC>100, leukocyte esterase and grew yeast.  Labs remarkable for WBC 15.9 decreasing, Hb 9.5, Na-corrected 154, and CRP 7.99. Patient seen by ID. Recommends continuing zosyn and discontinuing vancomycin. 4/18  Patient is seen at bedside. She is awake and alert x3. She is able to say a few words this morning. She has no new complaints. She is on zosyn and fluconazole. PEG tube is clogged. Seen by vascular surgeon. Recommends great toe and second toe amputation. Awaiting for consent from daughter. Labs remarkable for WBC 11.4 decreasing, Hb 9.8, Na 145, and pro-calcitonin 2.01.    4/19  Patient was talking this morning and has no new complaints today. Patient is still receiving fluconazole. PEG tube in place and working. Labs remarkable for WBC 12.9, Hgb 9.1, and CRP 7.68. Seen by ID. Recommends transitioning to oral fluconazole 200 mg daily for 11 more days. 4/20  Patient is seen at bedside with family members. Patient is getting her left great toe and second toe amputation today. No new changes observed today.      Labs remarkable for WBC 11.6, Hgb 8.9, CRP 7.05 which are all improving.     4/21  Patient tolerated left great toe and second toe amputation well. She is seen in mild distress due to pain from the surgery. Surgeon recommends wound VAC on discharge. He also Recommends eventual colostomy but notes that patient would not tolerate anesthesia at this time. Labs remarkable for WBC 13.3, Hgb 9.4 improving, K 5.8, and CRP 8. 28. Review of Systems   Unable to perform ROS: Acuity of condition   All other systems reviewed and are negative. Unable to obtain. Objective     Visit Vitals  BP (!) 153/64 (BP 1 Location: Left upper arm)   Pulse 74   Temp 98.8 °F (37.1 °C)   Resp 16   Ht 5' 7.99\" (1.727 m)   Wt 86.6 kg (191 lb)   SpO2 98%   BMI 29.05 kg/m²    O2 Flow Rate (L/min): 0 l/min O2 Device: None (Room air)    Physical Exam:   Physical Exam  Constitutional: Open eyes do not follow any commands. Tracks with eyes. HENT:   Head: Normocephalic and atraumatic. Eyes: Pupils are equal, round, and reactive to light. EOM are normal.   Cardiovascular: Normal rate, regular rhythm and normal heart sounds. Pulmonary/Chest: Decreased breath sound   abdominal: Soft. Bowel sounds are normal. There is no abdominal tenderness. There is no rebound and no guarding. Musculoskeletal: Normal range of motion. Neurological: Open eyes do not follow any, right-sided weakness  PEG tube in place. Intake & Output:  Current Shift: No intake/output data recorded. Last three shifts: 04/19 1901 - 04/21 0700  In: 200 [I.V.:200]  Out: 2800 [Urine:2800]    Lab/Data Review:  Lab results reviewed. For significant abnormal values and values requiring intervention, see assessment and plan.        24 Hour Results:    Recent Results (from the past 24 hour(s))   GLUCOSE, POC    Collection Time: 04/20/22  3:37 PM   Result Value Ref Range    Glucose (POC) 141 (H) 65 - 117 mg/dL    Performed by Jim Jenkins    GLUCOSE, POC    Collection Time: 04/20/22  6:59 PM   Result Value Ref Range    Glucose (POC) 117 65 - 117 mg/dL    Performed by Jesse Lynn    GLUCOSE, POC    Collection Time: 04/21/22 12:56 AM   Result Value Ref Range    Glucose (POC) 193 (H) 65 - 117 mg/dL    Performed by [address] [place] [address], POC    Collection Time: 04/21/22  6:27 AM   Result Value Ref Range    Glucose (POC) 315 (H) 65 - 117 mg/dL    Performed by VERONICA RITCHIE    CBC WITH AUTOMATED DIFF    Collection Time: 04/21/22  7:04 AM   Result Value Ref Range    WBC 13.3 (H) 3.6 - 11.0 K/uL    RBC 3.33 (L) 3.80 - 5.20 M/uL    HGB 9.4 (L) 11.5 - 16.0 g/dL    HCT 29.6 (L) 35.0 - 47.0 %    MCV 88.9 80.0 - 99.0 FL    MCH 28.2 26.0 - 34.0 PG    MCHC 31.8 30.0 - 36.5 g/dL    RDW 16.4 (H) 11.5 - 14.5 %    PLATELET 765 281 - 973 K/uL    MPV 12.2 8.9 - 12.9 FL    NRBC 0.0 0.0  WBC    ABSOLUTE NRBC 0.00 0.00 - 0.01 K/uL    NEUTROPHILS 91 (H) 32 - 75 %    LYMPHOCYTES 7 (L) 12 - 49 %    MONOCYTES 1 (L) 5 - 13 %    EOSINOPHILS 0 0 - 7 %    BASOPHILS 0 0 - 1 %    IMMATURE GRANULOCYTES 1 (H) 0 - 0.5 %    ABS. NEUTROPHILS 12.2 (H) 1.8 - 8.0 K/UL    ABS. LYMPHOCYTES 0.9 0.8 - 3.5 K/UL    ABS. MONOCYTES 0.2 0.0 - 1.0 K/UL    ABS. EOSINOPHILS 0.0 0.0 - 0.4 K/UL    ABS. BASOPHILS 0.0 0.0 - 0.1 K/UL    ABS. IMM.  GRANS. 0.1 (H) 0.00 - 0.04 K/UL    DF AUTOMATED     C REACTIVE PROTEIN, QT    Collection Time: 04/21/22  7:04 AM   Result Value Ref Range    C-Reactive protein 8.28 (H) 0.00 - 0.60 mg/dL   PROCALCITONIN    Collection Time: 04/21/22  7:04 AM   Result Value Ref Range    Procalcitonin 2.91 (H) 0 ng/mL   METABOLIC PANEL, COMPREHENSIVE    Collection Time: 04/21/22  7:04 AM   Result Value Ref Range    Sodium 138 136 - 145 mmol/L    Potassium 5.8 (H) 3.5 - 5.1 mmol/L    Chloride 107 97 - 108 mmol/L    CO2 27 21 - 32 mmol/L    Anion gap 4 (L) 5 - 15 mmol/L    Glucose 313 (H) 65 - 100 mg/dL    BUN 26 (H) 6 - 20 mg/dL    Creatinine 0.78 0.55 - 1.02 mg/dL    BUN/Creatinine ratio 33 (H) 12 - 20      GFR est AA >60 >60 ml/min/1.73m2    GFR est non-AA >60 >60 ml/min/1.73m2    Calcium 8.3 (L) 8.5 - 10.1 mg/dL    Bilirubin, total 0.2 0.2 - 1.0 mg/dL    AST (SGOT) 31 15 - 37 U/L    ALT (SGPT) 54 12 - 78 U/L    Alk. phosphatase 93 45 - 117 U/L    Protein, total 6.0 (L) 6.4 - 8.2 g/dL    Albumin 1.6 (L) 3.5 - 5.0 g/dL    Globulin 4.4 (H) 2.0 - 4.0 g/dL    A-G Ratio 0.4 (L) 1.1 - 2.2           Imaging:    XR ABD (KUB)   Final Result   Percutaneous gastrostomy tube tip in the gastric antrum. XR CHEST PORT   Final Result   No acute pulmonary process. XR CHEST PORT   Final Result   No significant change. XR CHEST PORT   Final Result      XR CHEST PORT   Final Result   No acute cardiopulmonary abnormality. .       XR CHEST PORT   Final Result      CT HEAD WO CONT   Final Result   Chronic changes which appear stable. No acute or active intracranial process. Chronic paranasal sinus disease         XR CHEST PORT   Final Result   No acute findings. Assessment   Severe sepsis with UTI  UTI/Pseudomonas  Acute on chronic kidney disease  Hyperkalemia  Hypernatremia, improving  CVA with right-sided weakness  Anemia of chronic disease  Gangrene of the left great toe-s/p removal of left great and second toe  Sacral decubitus ulcer for debridement of the wound today  Acute respiratory failure  Possible aspiration  Elevated BNP  PEG tube malfunction/seen by the GI now working      Plan     Amlodipine 5 mg daily  Aspirin 81 mg daily  Lipitor 20 daily  Ferrous sulfate 300 twice a day  Gabapentin 300 mg once in the morning and at bedtime  Heparin 5000 units subcu every 8 hours  Hydralazine 50 mg 3 times a day  Lantus 22 units subcu twice a day  Metoprolol 50 mg twice a day  IV Zosyn 3.375 IV every 8 hours  Tramadol 50 mg Q6 PRN- increase to 100 mg  Discontinue Vancomycin  Flucanazole 100 mg daily  Monitor renal function and sodium level.   Pulmonary nephrology infectious disease and vascular surgical.  Amputation of the left great toe and second toe today. Start back on gabapentin at night only  Blood sugars running high secondary D5 at 75 cc/h as per nephrology    Discussed with the patient's daughter at the bedside  Possible discharge in 48 hours. 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   Requiring surgical wound debridement  Kayexalate 30        Current Facility-Administered Medications:     traMADoL (ULTRAM) tablet 50 mg, 50 mg, Oral, Q6H PRN, Lilia Bunch MD, 50 mg at 04/21/22 0615    fluconazole (DIFLUCAN) 200mg/100 mL IVPB (premix), 200 mg, IntraVENous, Q24H, Trudy Garcia MD, Last Rate: 100 mL/hr at 04/20/22 2202, 200 mg at 04/20/22 2202    gabapentin (NEURONTIN) capsule 300 mg, 300 mg, Oral, BID, Israel Soto MD, 300 mg at 04/20/22 2201    insulin glargine (LANTUS) injection 32 Units, 32 Units, SubCUTAneous, BID, Israel Soto MD, 32 Units at 04/20/22 2201    artificial saliva (MOUTH KOTE) 1 Spray, 1 Spray, Oral, PRN, Israel Soto MD, 1 Spray at 04/15/22 1053    amLODIPine (NORVASC) tablet 5 mg, 5 mg, Oral, DAILY, Cristian Nguyễn MD, 5 mg at 04/20/22 1011    metoprolol tartrate (LOPRESSOR) tablet 50 mg, 50 mg, Oral, BID, Israel Soto MD, 50 mg at 04/20/22 1012    nitroglycerin (NITROBID) 2 % ointment 1 Inch, 1 Inch, Topical, Q6H, Gely Corona MD, 1 Inch at 04/21/22 0636    hydrALAZINE (APRESOLINE) tablet 50 mg, 50 mg, Oral, TID, Cristian Nguyễn MD, 50 mg at 04/20/22 1011    [Held by provider] furosemide (LASIX) injection 40 mg, 40 mg, IntraVENous, DAILY, Cristian Nguyễn MD, 40 mg at 04/09/22 1054    labetaloL (NORMODYNE;TRANDATE) 20 mg/4 mL (5 mg/mL) injection 20 mg, 20 mg, IntraVENous, Q4H PRN, Cristian Nguyễn MD, 20 mg at 04/15/22 0255    hydrALAZINE (APRESOLINE) 20 mg/mL injection 20 mg, 20 mg, IntraVENous, Q6H PRN, Israel Soto MD, 20 mg at 04/19/22 2243    0.9% sodium chloride infusion 250 mL, 250 mL, IntraVENous, PRN, Marissa Higgins MD  [facility] insulin lispro (HUMALOG) injection, , SubCUTAneous, Q6H, Israel Soto MD, 5 Units at 04/21/22 0636    atorvastatin (LIPITOR) tablet 20 mg, 20 mg, Oral, QHS, Israel Soto MD, 20 mg at 04/20/22 2201    latanoprost (XALATAN) 0.005 % ophthalmic solution 1 Drop, 1 Drop, Right Eye, QPM, Sera Posada MD, 1 Drop at 04/21/22 1035    aspirin delayed-release tablet 81 mg, 81 mg, Oral, DAILY, Israel Soto MD, 81 mg at 04/19/22 0900    brimonidine (ALPHAGAN) 0.2 % ophthalmic solution 1 Drop, 1 Drop, Both Eyes, TID, Israel Soto MD, 1 Drop at 04/21/22 1035    glucose chewable tablet 16 g, 4 Tablet, Oral, PRN, Catrina Soto MD    glucagon (GLUCAGEN) injection 1 mg, 1 mg, IntraMUSCular, PRN, Sera Posada MD, 1 mg at 04/18/22 1145    [DISCONTINUED] acetaminophen (TYLENOL) tablet 650 mg, 650 mg, Oral, Q6H PRN, 650 mg at 04/04/22 2246 **OR** acetaminophen (TYLENOL) suppository 650 mg, 650 mg, Rectal, Q6H PRN, Catrina Soto MD    polyethylene glycol (MIRALAX) packet 17 g, 17 g, Oral, DAILY PRN, Catrina Soto MD    ondansetron (ZOFRAN ODT) tablet 4 mg, 4 mg, Oral, Q8H PRN **OR** ondansetron (ZOFRAN) injection 4 mg, 4 mg, IntraVENous, Q6H PRN, Israel Soto MD    heparin (porcine) injection 5,000 Units, 5,000 Units, SubCUTAneous, Q8H, Israel Soto MD, 5,000 Units at 04/20/22 0000    albuterol-ipratropium (DUO-NEB) 2.5 MG-0.5 MG/3 ML, 3 mL, Nebulization, Q6H PRN, Catrina Soto MD    ferrous sulfate 300 mg (60 mg iron)/5 mL oral syrup 300 mg, 300 mg, Oral, BID, Israel Soto MD, 300 mg at 04/20/22 2201    acetaminophen (TYLENOL) solution 650 mg, 650 mg, Per NG tube, Q6H PRN, Israel Soto MD, 650 mg at 04/19/22 8992    Current Outpatient Medications   Medication Instructions    acidophilus-pectin, citrus 25 million cell -100 mg tab tablet 2 Tablets, Oral, DAILY    albuterol-ipratropium (DUO-NEB) 2.5 mg-0.5 mg/3 ml nebu 3 mL, Nebulization, EVERY 6 HOURS AS NEEDED  amLODIPine (NORVASC) 5 mg, Oral, DAILY    artificial saliva (MOUTH KOTE) spra 1 Spray, Oral, 3 TIMES DAILY    aspirin delayed-release 81 mg tablet Oral, DAILY    atorvastatin (LIPITOR) 20 mg tablet TAKE 1 TABLET BY MOUTH EVERY NIGHT    brimonidine (ALPHAGAN) 0.2 % ophthalmic solution 1 Drop, Both Eyes, 3 TIMES DAILY    ferrous sulfate 325 mg, Oral, 2 TIMES DAILY    fluconazole (DIFLUCAN) 200 mg, Oral, DAILY, Fluconazole 200 mg daily for 11 days    gabapentin (NEURONTIN) 300 mg, Oral, 2 TIMES DAILY    hydrALAZINE (APRESOLINE) 50 mg, Oral, 3 TIMES DAILY    insulin glargine (LANTUS) 50 Units, SubCUTAneous, DAILY    latanoprost (XALATAN) 0.005 % ophthalmic solution 1 Drop, Right Eye, EVERY EVENING    lidocaine (XYLOCAINE) 4 % (40 mg/mL) topical solution 1 mL, Topical, AS NEEDED    metoprolol succinate (TOPROL-XL) 50 mg, Oral, 2 TIMES DAILY    metoprolol tartrate (LOPRESSOR) 50 mg, Oral, 2 TIMES DAILY         Signed By: Karsten Bryan MD     April 21, 2022

## 2022-04-21 NOTE — PROGRESS NOTES
Progress Note  Date:2022       Room:OR/PL  Patient Rafa Velasquez     YOB: 1945     Age:77 y.o. Subjective    Subjective:  Symptoms:  Stable. No shortness of breath or chest pain. Diet:  NPO. Review of Systems   Respiratory: Negative for shortness of breath. Cardiovascular: Negative for chest pain. Objective         Vitals Last 24 Hours:  TEMPERATURE:  Temp  Av.2 °F (36.8 °C)  Min: 97.7 °F (36.5 °C)  Max: 98.8 °F (37.1 °C)  RESPIRATIONS RANGE: Resp  Av.7  Min: 16  Max: 22  PULSE OXIMETRY RANGE: SpO2  Av.3 %  Min: 95 %  Max: 100 %  PULSE RANGE: Pulse  Av.6  Min: 65  Max: 80  BLOOD PRESSURE RANGE: Systolic (36IFV), DEE:146 , Min:140 , BHF:776   ; Diastolic (45GYC), GIM:89, Min:46, Max:64    I/O (24Hr): Intake/Output Summary (Last 24 hours) at 2022 1705  Last data filed at 2022 1704  Gross per 24 hour   Intake 400 ml   Output 1650 ml   Net -1250 ml     Objective:  General Appearance:  Comfortable and well-appearing. Vital signs: (most recent): Blood pressure (!) 156/57, pulse 80, temperature 98.2 °F (36.8 °C), resp. rate 18, height 5' 7.99\" (1.727 m), weight 86.6 kg (191 lb), SpO2 99 %. Output: Producing urine. Lungs:  Normal effort. Heart: Normal rate. Abdomen: Abdomen is soft. Labs/Imaging/Diagnostics    Labs:  CBC:  Recent Labs     22  0704 22  0828 22  0710   WBC 13.3* 11.6* 12.9*   RBC 3.33* 3.17* 3.16*   HGB 9.4* 8.9* 9.1*   HCT 29.6* 28.1* 28.7*   MCV 88.9 88.6 90.8   RDW 16.4* 16.4* 16.5*    255 251     CHEMISTRIES:  Recent Labs     22  0704 22  0710    138   K 5.8* 4.6    108   CO2 27 24   BUN 26* 26*   CA 8.3* 7.8*   PT/INR:No results for input(s): INR, INREXT in the last 72 hours. No lab exists for component: PROTIME  APTT:No results for input(s): APTT in the last 72 hours.   LIVER PROFILE:  Recent Labs     22  8489 04/19/22  0710   AST 31 42*   ALT 54 61     Lab Results   Component Value Date/Time    ALT (SGPT) 54 04/21/2022 07:04 AM    AST (SGOT) 31 04/21/2022 07:04 AM    Alk. phosphatase 93 04/21/2022 07:04 AM    Bilirubin, direct 0.2 03/15/2022 07:27 AM    Bilirubin, total 0.2 04/21/2022 07:04 AM       Imaging Last 24 Hours:  No results found. Assessment//Plan   Active Problems:    Hyperkalemia (4/4/2022)      UTI (urinary tract infection) (4/4/2022)      Sepsis ([facility] [address] (4/4/2022)      RICARDO (acute kidney injury) ([facility] [address] (4/4/2022)      AMS (altered mental status) (4/4/2022)      Assessment:    Condition: In stable condition. (- Blood pressure trend remains stable  - On amlodipine 5 mg daily, hydralazine 50 mg 3 times daily, metoprolol 50 mg twice daily.  - Received Kayexalate 30 g p.o. 1 dose today. Her serum potassium was 5.8 in the setting of chronic LE wound  - Her serum sodium has down trended now to 138. - She is off D5W. - Her furosemide 40 mg is on hold  - Her serum creatinine is in normal limits  - Her blood glucose levels continue to remain uncontrolled most recently at 313).        Electronically signed by Natacha Tran MD on 4/21/2022 at 5:05 PM

## 2022-04-21 NOTE — PROGRESS NOTES
PROGRESS NOTE    Patient: Kati Burr MRN: [id number]  SSN: [id number]    YOB: 1945  Age: 78 y.o. Sex: female      Admit Date: 4/3/2022    LOS: 17 days       Subjective     Chief Complaint   Patient presents with    Altered mental status       HPI: Patient is a 78y.o. 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 04/05   Patient is seen at bedside with daughter and nephew today. Patient is in distress due to pain and daughter notes that patient gets Neurontin TID for neuropathy daily. Otherwise, patient is still receiving enteric feeding through PEG tube and getting IVF. Patient was seen by nephrology yesterday. Recommends obtaining renal panel and continuing IVF. Patient was seen by ID yesterday. Recommends continuing IV vancomycin for urosepsis. Patient was seen by pulmonology today. Recommends PRBC transfusion. Labs indicate WBC 16.9, Hgb 6.3, Na 148, Cl 120, BUN 72, Cr 1.71, Ca 8, CRP 28.4, pro-carrie 6.93 and .       04/06   Patient is seen at bedside. Patient received 2 units of PRBC. Patient is on Zosyn. No new changes today. Patient seen by the vascular surgeon he recommend great toe amputation  And for sacral wound recommend Medihoney ointment     Labs show increasing Hgb of 9, decreasing WBC 14.7, Na 147, , BUN 55,   Cr 1.47, and CRP 21.4. CXR is unremarkable. 04/07  Patient is seen resting at bedside.  Patient received one unit of PRBC with increased Hgb of 10.6. No acute changes today. Patient is seen by nephrology who recommends free water flushes to correct hypernatremia. Labs show increasing Hgb of 10.6, WBC 15.1, pro-calcitonin 3.78, and CRP 16.1.    4/8    Patient had a rapid response this morning ABG and chest x-ray was ordered  Patient tachycardic tachypneic  ABG  pH 7.51 PCO2 31 PO2 65 bicarb 26 oxygen saturation 95% on room air    Chest x-ray shows no much changes    4/9    Patient resting the bed open eyes  On room air/breathing through the mouth    4/10    Patient open eyes not in distress breathing through her mouth  According to the nurse patient not sleep all day and all night yesterday      4/11  Patient is seen at bedside. She is awake but not in any distress. Patient is on zosyn. PEG tube in place. Urine culture positive for Pseudomonas. CXR shows no focal changes. Labs remarkable for WBC  22.3, Hgb 10.5, Na 153, , Cr 1.55 BUN 94, pro-calcitonin 1.64, and CRP 3.33.    4/12  Patient is on Vancomycin, zosyn and D5W. PEG tube in place. Spoke to daughter at bedside. She has no new complaints. Labs remarkable for:  WBC  18.9,  Cr 1.33, BUN 84,  and CRP 2.33 which are decreasing. Hgb 11.2, Na-corrected 158, , and pro-calcitonin 1.93 which are increasing. Seen by pulmonology. No new recommendations. Seen by GI. No new recommendations. Seen by nephrology. recommends IV D5W IVF to decrease hypernatremia. Seen by ID. Recommends discontinuing fluconazole, follow up blood cultures, and continuing IV zosyn and vancomycin. 4/13/2022  Patient is on Vancomycin, zosyn and D5W. PEG tube in place. Nephrology - recommends IV D5W IVF to decrease hypernatremia. Increase glargine to 25 units twice daily to correct hyperglycemia.  Once the D5W is discontinued, the dose of glargine will have to be decreased    ID - Continue IV Zosyn and Vancomycin; discontinue Vancomycin if WBC continues to decrease    Pulmonology - No new recommendations. GI - No new recommendations. CXR 4/11/2022 - No acute pulmonary process (no focal changes)     Labs remarkable for:  Blood Glucose 380  WBC  19.8  Hgb 10.3   Na-corrected 154  BUN 69  Cr 1.22  CRP 3.14  pro-calcitonin 2.28     4/14  Patient's family members are present at bedside with the patient. They notes that the patient was in distress this morning. Patient still on vancomycin and zosyn. PEG tube in place and patient is receiving enteral feedings. Seen by vascular surgeon who recommends great toe amputation. Labs remarkable for Na-corrected 154 improving, Cr 1.19 improving, pro-carrie 2.42,  and CRP 7.65.    4/15  Patient is seen grunting and restless at bedside. Daughter is at bedside. She has no new complaints. UA on 4/14 still positive for WBC>100, leukocyte esterase and grew yeast.  Labs remarkable for WBC 15.9 decreasing, Hb 9.5, Na-corrected 154, and CRP 7.99. Patient seen by ID. Recommends continuing zosyn and discontinuing vancomycin. 4/18  Patient is seen at bedside. She is awake and alert x3. She is able to say a few words this morning. She has no new complaints. She is on zosyn and fluconazole. PEG tube is clogged. Seen by vascular surgeon. Recommends great toe and second toe amputation. Awaiting for consent from daughter. Labs remarkable for WBC 11.4 decreasing, Hb 9.8, Na 145, and pro-calcitonin 2.01.    4/19  Patient was talking this morning and has no new complaints today. Patient is still receiving fluconazole. PEG tube in place and working. Labs remarkable for WBC 12.9, Hgb 9.1, and CRP 7.68. Seen by ID. Recommends transitioning to oral fluconazole 200 mg daily for 11 more days. 4/20  Patient is seen at bedside with family members. Patient is getting her left great toe and second toe amputation today. No new changes observed today.      Labs remarkable for WBC 11.6, Hgb 8.9, CRP 7.05 which are all improving.     4/21  Patient tolerated left great toe and second toe amputation well. She is seen in mild distress due to pain from the surgery. Surgeon recommends wound VAC on discharge. He also Recommends eventual colostomy but notes that patient would not tolerate anesthesia at this time. Labs remarkable for WBC 13.3, Hgb 9.4 improving, K 5.8, and CRP 8. 28. Review of Systems   Unable to perform ROS: Acuity of condition   All other systems reviewed and are negative. Unable to obtain. Objective     Visit Vitals  BP (!) 153/64 (BP 1 Location: Left upper arm)   Pulse 74   Temp 98.8 °F (37.1 °C)   Resp 16   Ht 5' 7.99\" (1.727 m)   Wt 191 lb (86.6 kg)   SpO2 98%   BMI 29.05 kg/m²    O2 Flow Rate (L/min): 0 l/min O2 Device: None (Room air)    Physical Exam:   Physical Exam  Constitutional: Open eyes do not follow any commands. Tracks with eyes. HENT:   Head: Normocephalic and atraumatic. Eyes: Pupils are equal, round, and reactive to light. EOM are normal.   Cardiovascular: Normal rate, regular rhythm and normal heart sounds. Pulmonary/Chest: Decreased breath sound   abdominal: Soft. Bowel sounds are normal. There is no abdominal tenderness. There is no rebound and no guarding. Musculoskeletal: Normal range of motion. Neurological: Open eyes do not follow any, right-sided weakness  PEG tube in place. Intake & Output:  Current Shift: No intake/output data recorded. Last three shifts: 04/19 1901 - 04/21 0700  In: 200 [I.V.:200]  Out: 2800 [Urine:2800]    Lab/Data Review:  Lab results reviewed. For significant abnormal values and values requiring intervention, see assessment and plan.        24 Hour Results:    Recent Results (from the past 24 hour(s))   GLUCOSE, POC    Collection Time: 04/20/22 11:11 AM   Result Value Ref Range    Glucose (POC) 190 (H) 65 - 117 mg/dL    Performed by EZEQUIEL EDWARDS    GLUCOSE, POC    Collection Time: 04/20/22  3:37 PM   Result Value Ref Range    Glucose (POC) 141 (H) 65 - 117 mg/dL    Performed by Logan Zavala    GLUCOSE, POC    Collection Time: 04/20/22  6:59 PM   Result Value Ref Range    Glucose (POC) 117 65 - 117 mg/dL    Performed by Pauline Nash    GLUCOSE, POC    Collection Time: 04/21/22 12:56 AM   Result Value Ref Range    Glucose (POC) 193 (H) 65 - 117 mg/dL    Performed by [address] [place] [address], POC    Collection Time: 04/21/22  6:27 AM   Result Value Ref Range    Glucose (POC) 315 (H) 65 - 117 mg/dL    Performed by VERONICA RITCHIE    CBC WITH AUTOMATED DIFF    Collection Time: 04/21/22  7:04 AM   Result Value Ref Range    WBC 13.3 (H) 3.6 - 11.0 K/uL    RBC 3.33 (L) 3.80 - 5.20 M/uL    HGB 9.4 (L) 11.5 - 16.0 g/dL    HCT 29.6 (L) 35.0 - 47.0 %    MCV 88.9 80.0 - 99.0 FL    MCH 28.2 26.0 - 34.0 PG    MCHC 31.8 30.0 - 36.5 g/dL    RDW 16.4 (H) 11.5 - 14.5 %    PLATELET 845 195 - 412 K/uL    MPV 12.2 8.9 - 12.9 FL    NRBC 0.0 0.0  WBC    ABSOLUTE NRBC 0.00 0.00 - 0.01 K/uL    NEUTROPHILS 91 (H) 32 - 75 %    LYMPHOCYTES 7 (L) 12 - 49 %    MONOCYTES 1 (L) 5 - 13 %    EOSINOPHILS 0 0 - 7 %    BASOPHILS 0 0 - 1 %    IMMATURE GRANULOCYTES 1 (H) 0 - 0.5 %    ABS. NEUTROPHILS 12.2 (H) 1.8 - 8.0 K/UL    ABS. LYMPHOCYTES 0.9 0.8 - 3.5 K/UL    ABS. MONOCYTES 0.2 0.0 - 1.0 K/UL    ABS. EOSINOPHILS 0.0 0.0 - 0.4 K/UL    ABS. BASOPHILS 0.0 0.0 - 0.1 K/UL    ABS. IMM.  GRANS. 0.1 (H) 0.00 - 0.04 K/UL    DF AUTOMATED     C REACTIVE PROTEIN, QT    Collection Time: 04/21/22  7:04 AM   Result Value Ref Range    C-Reactive protein 8.28 (H) 0.00 - 7.55 mg/dL   METABOLIC PANEL, COMPREHENSIVE    Collection Time: 04/21/22  7:04 AM   Result Value Ref Range    Sodium 138 136 - 145 mmol/L    Potassium 5.8 (H) 3.5 - 5.1 mmol/L    Chloride 107 97 - 108 mmol/L    CO2 27 21 - 32 mmol/L    Anion gap 4 (L) 5 - 15 mmol/L    Glucose 313 (H) 65 - 100 mg/dL    BUN 26 (H) 6 - 20 mg/dL    Creatinine 0.78 0.55 - 1.02 mg/dL    BUN/Creatinine ratio 33 (H) 12 - 20 GFR est AA >60 >60 ml/min/1.73m2    GFR est non-AA >60 >60 ml/min/1.73m2    Calcium 8.3 (L) 8.5 - 10.1 mg/dL    Bilirubin, total 0.2 0.2 - 1.0 mg/dL    AST (SGOT) 31 15 - 37 U/L    ALT (SGPT) 54 12 - 78 U/L    Alk. phosphatase 93 45 - 117 U/L    Protein, total 6.0 (L) 6.4 - 8.2 g/dL    Albumin 1.6 (L) 3.5 - 5.0 g/dL    Globulin 4.4 (H) 2.0 - 4.0 g/dL    A-G Ratio 0.4 (L) 1.1 - 2.2           Imaging:    XR ABD (KUB)   Final Result   Percutaneous gastrostomy tube tip in the gastric antrum. XR CHEST PORT   Final Result   No acute pulmonary process. XR CHEST PORT   Final Result   No significant change. XR CHEST PORT   Final Result      XR CHEST PORT   Final Result   No acute cardiopulmonary abnormality. .       XR CHEST PORT   Final Result      CT HEAD WO CONT   Final Result   Chronic changes which appear stable. No acute or active intracranial process. Chronic paranasal sinus disease         XR CHEST PORT   Final Result   No acute findings. Assessment   Severe sepsis with UTI  UTI/Pseudomonas  Acute on chronic kidney disease  Hyperkalemia  Hypernatremia, improving  CVA with right-sided weakness  Anemia of chronic disease  Gangrene of the left great toe-s/p removal of left great and second toe  Sacral decubitus ulcer  Acute respiratory failure  Possible aspiration  Elevated BNP  PEG tube malfunction/seen by the GI now working    Plan     Amlodipine 5 mg daily  Aspirin 81 mg daily  Lipitor 20 daily  Ferrous sulfate 300 twice a day  Gabapentin 300 mg once in the morning and at bedtime  Heparin 5000 units subcu every 8 hours  Hydralazine 50 mg 3 times a day  Lantus 22 units subcu twice a day  Metoprolol 50 mg twice a day  IV Zosyn 3.375 IV every 8 hours  Tramadol 50 mg Q6 PRN- increase to 100 mg  Discontinue Vancomycin  Flucanazole 100 mg daily  Monitor renal function and sodium level.   Pulmonary nephrology infectious disease and vascular surgical.  Amputation of the left great toe and second toe today. Start back on gabapentin at night only  Blood sugars running high secondary D5 at 75 cc/h as per nephrology    Discussed with the patient's daughter at the bedside  Possible discharge in 48 hours.          Current Facility-Administered Medications:     traMADoL (ULTRAM) tablet 50 mg, 50 mg, Oral, Q6H PRN, Carrington Reyes MD, 50 mg at 04/21/22 0615    fluconazole (DIFLUCAN) 200mg/100 mL IVPB (premix), 200 mg, IntraVENous, Q24H, Crys Chin MD, Last Rate: 100 mL/hr at 04/20/22 2202, 200 mg at 04/20/22 2202    gabapentin (NEURONTIN) capsule 300 mg, 300 mg, Oral, BID, Israel Soto MD, 300 mg at 04/20/22 2201    insulin glargine (LANTUS) injection 32 Units, 32 Units, SubCUTAneous, BID, Israel Soto MD, 32 Units at 04/20/22 2201    artificial saliva (MOUTH KOTE) 1 Spray, 1 Spray, Oral, PRN, Israel Soto MD, 1 Spray at 04/15/22 1053    amLODIPine (NORVASC) tablet 5 mg, 5 mg, Oral, DAILY, Jeb Ruffin MD, 5 mg at 04/20/22 1011    metoprolol tartrate (LOPRESSOR) tablet 50 mg, 50 mg, Oral, BID, Israel Soto MD, 50 mg at 04/20/22 1012    nitroglycerin (NITROBID) 2 % ointment 1 Inch, 1 Inch, Topical, Q6H, Andrew Linares MD, 1 Inch at 04/21/22 0636    hydrALAZINE (APRESOLINE) tablet 50 mg, 50 mg, Oral, TID, Israel Soto MD, 50 mg at 04/20/22 1011    [Held by provider] furosemide (LASIX) injection 40 mg, 40 mg, IntraVENous, DAILY, Israel Soto MD, 40 mg at 04/09/22 1054    labetaloL (NORMODYNE;TRANDATE) 20 mg/4 mL (5 mg/mL) injection 20 mg, 20 mg, IntraVENous, Q4H PRN, Israel Soto MD, 20 mg at 04/15/22 0255    hydrALAZINE (APRESOLINE) 20 mg/mL injection 20 mg, 20 mg, IntraVENous, Q6H PRN, Israel Soto MD, 20 mg at 04/19/22 2243    0.9% sodium chloride infusion 250 mL, 250 mL, IntraVENous, PRN, Martínez Crespo MD    insulin lispro (HUMALOG) injection, , SubCUTAneous, Q6H, Israel Soto MD, 5 Units at 04/21/22 0636    atorvastatin (LIPITOR) tablet 20 mg, 20 mg, Oral, QHS, Israel Soto MD, 20 mg at 04/20/22 2201    latanoprost (XALATAN) 0.005 % ophthalmic solution 1 Drop, 1 Drop, Right Eye, QPM, Lynette Mendoza MD, 1 Drop at 04/20/22 1744    aspirin delayed-release tablet 81 mg, 81 mg, Oral, DAILY, Israel Soto MD, 81 mg at 04/19/22 0900    brimonidine (ALPHAGAN) 0.2 % ophthalmic solution 1 Drop, 1 Drop, Both Eyes, TID, Israel Soto MD, 1 Drop at 04/20/22 2200    glucose chewable tablet 16 g, 4 Tablet, Oral, PRN, Memo Soto MD    glucagon (GLUCAGEN) injection 1 mg, 1 mg, IntraMUSCular, PRN, Lynette Mendoza MD, 1 mg at 04/18/22 1145    [DISCONTINUED] acetaminophen (TYLENOL) tablet 650 mg, 650 mg, Oral, Q6H PRN, 650 mg at 04/04/22 2246 **OR** acetaminophen (TYLENOL) suppository 650 mg, 650 mg, Rectal, Q6H PRN, Memo Soto MD    polyethylene glycol (MIRALAX) packet 17 g, 17 g, Oral, DAILY PRN, Memo Soto MD    ondansetron (ZOFRAN ODT) tablet 4 mg, 4 mg, Oral, Q8H PRN **OR** ondansetron (ZOFRAN) injection 4 mg, 4 mg, IntraVENous, Q6H PRN, Israel Soto MD    heparin (porcine) injection 5,000 Units, 5,000 Units, SubCUTAneous, Q8H, Israel Soto MD, 5,000 Units at 04/20/22 0000    albuterol-ipratropium (DUO-NEB) 2.5 MG-0.5 MG/3 ML, 3 mL, Nebulization, Q6H PRN, Memo Soto MD    ferrous sulfate 300 mg (60 mg iron)/5 mL oral syrup 300 mg, 300 mg, Oral, BID, Israel Soto MD, 300 mg at 04/20/22 2201    acetaminophen (TYLENOL) solution 650 mg, 650 mg, Per NG tube, Q6H PRN, Israel Soto MD, 650 mg at 04/19/22 3322    Current Outpatient Medications   Medication Instructions    acidophilus-pectin, citrus 25 million cell -100 mg tab tablet 2 Tablets, Oral, DAILY    albuterol-ipratropium (DUO-NEB) 2.5 mg-0.5 mg/3 ml nebu 3 mL, Nebulization, EVERY 6 HOURS AS NEEDED    amLODIPine (NORVASC) 5 mg, Oral, DAILY    artificial saliva (MOUTH KOTE) spra 1 Spray, Oral, 3 TIMES DAILY    aspirin delayed-release 81 mg tablet Oral, DAILY    atorvastatin (LIPITOR) 20 mg tablet TAKE 1 TABLET BY MOUTH EVERY NIGHT    brimonidine (ALPHAGAN) 0.2 % ophthalmic solution 1 Drop, Both Eyes, 3 TIMES DAILY    ferrous sulfate 325 mg, Oral, 2 TIMES DAILY    fluconazole (DIFLUCAN) 200 mg, Oral, DAILY, Fluconazole 200 mg daily for 11 days    gabapentin (NEURONTIN) 300 mg, Oral, 2 TIMES DAILY    hydrALAZINE (APRESOLINE) 50 mg, Oral, 3 TIMES DAILY    insulin glargine (LANTUS) 50 Units, SubCUTAneous, DAILY    latanoprost (XALATAN) 0.005 % ophthalmic solution 1 Drop, Right Eye, EVERY EVENING    lidocaine (XYLOCAINE) 4 % (40 mg/mL) topical solution 1 mL, Topical, AS NEEDED    metoprolol succinate (TOPROL-XL) 50 mg, Oral, 2 TIMES DAILY    metoprolol tartrate (LOPRESSOR) 50 mg, Oral, 2 TIMES DAILY         Signed By: Delmy Jordan     April 21, 2022

## 2022-04-21 NOTE — PROGRESS NOTES
PROGRESS NOTE      Chief Complaints:  No major events overnight. HPI and  Objective:    Patient is opening eyes spontaneously. Denies any particular pain. No fever chills. Review of Systems:  Unable to assess due to mental status  EXAM:  Visit Vitals  BP (!) 153/64 (BP 1 Location: Left upper arm)   Pulse 74   Temp 98.8 °F (37.1 °C)   Resp 16   Ht 5' 7.99\" (1.727 m)   Wt 191 lb (86.6 kg)   SpO2 98%   BMI 29.05 kg/m²       Patient is awake   Head and neck atraumatic, normocephalic. ENT: No hoarse voice  Cardiac system regular rate rhythm. Pulmonary no audible wheeze  Chest wall excursion normal with respiration cycle  Abdomen is soft not particularly distended. Neurologically nonfocal.  Skin is warm and moist.  Psychosocial: Cooperative. Vascular examination as previously noted no changes. Recent Results (from the past 24 hour(s))   GLUCOSE, POC    Collection Time: 04/20/22  7:40 AM   Result Value Ref Range    Glucose (POC) 225 (H) 65 - 117 mg/dL    Performed by EZEQUIEL EDWARDS    CBC WITH AUTOMATED DIFF    Collection Time: 04/20/22  8:28 AM   Result Value Ref Range    WBC 11.6 (H) 3.6 - 11.0 K/uL    RBC 3.17 (L) 3.80 - 5.20 M/uL    HGB 8.9 (L) 11.5 - 16.0 g/dL    HCT 28.1 (L) 35.0 - 47.0 %    MCV 88.6 80.0 - 99.0 FL    MCH 28.1 26.0 - 34.0 PG    MCHC 31.7 30.0 - 36.5 g/dL    RDW 16.4 (H) 11.5 - 14.5 %    PLATELET 365 091 - 363 K/uL    MPV 12.5 8.9 - 12.9 FL    NRBC 0.0 0.0  WBC    ABSOLUTE NRBC 0.00 0.00 - 0.01 K/uL    NEUTROPHILS 76 (H) 32 - 75 %    LYMPHOCYTES 17 12 - 49 %    MONOCYTES 5 5 - 13 %    EOSINOPHILS 1 0 - 7 %    BASOPHILS 0 0 - 1 %    IMMATURE GRANULOCYTES 1 (H) 0 - 0.5 %    ABS. NEUTROPHILS 9.0 (H) 1.8 - 8.0 K/UL    ABS. LYMPHOCYTES 1.9 0.8 - 3.5 K/UL    ABS. MONOCYTES 0.5 0.0 - 1.0 K/UL    ABS. EOSINOPHILS 0.1 0.0 - 0.4 K/UL    ABS. BASOPHILS 0.0 0.0 - 0.1 K/UL    ABS. IMM.  GRANS. 0.1 (H) 0.00 - 0.04 K/UL    DF AUTOMATED     C REACTIVE PROTEIN, QT    Collection Time: 04/20/22  8:28 AM Result Value Ref Range    C-Reactive protein 7.05 (H) 0.00 - 0.60 mg/dL   PROCALCITONIN    Collection Time: 04/20/22  8:28 AM   Result Value Ref Range    Procalcitonin 4.99 (H) 0 ng/mL   GLUCOSE, POC    Collection Time: 04/20/22 11:11 AM   Result Value Ref Range    Glucose (POC) 190 (H) 65 - 117 mg/dL    Performed by EZEQUIEL EDWARDS    GLUCOSE, POC    Collection Time: 04/20/22  3:37 PM   Result Value Ref Range    Glucose (POC) 141 (H) 65 - 117 mg/dL    Performed by Cala Burkitt De'Ondra    GLUCOSE, POC    Collection Time: 04/20/22  6:59 PM   Result Value Ref Range    Glucose (POC) 117 65 - 117 mg/dL    Performed by Tony Ramos    GLUCOSE, POC    Collection Time: 04/21/22 12:56 AM   Result Value Ref Range    Glucose (POC) 193 (H) 65 - 117 mg/dL    Performed by [address] [place] [address], POC    Collection Time: 04/21/22  6:27 AM   Result Value Ref Range    Glucose (POC) 315 (H) 65 - 117 mg/dL    Performed by VERONICA RITCHIE        ASSESSMENT:   Patient is 78 y.o. with diagnosis of : Active Problems:    Hyperkalemia (4/4/2022)      UTI (urinary tract infection) (4/4/2022)      Sepsis ([facility] [address] (4/4/2022)      RICARDO (acute kidney injury) ([facility] [address] (4/4/2022)      AMS (altered mental status) (4/4/2022)        PLAN:                 Patient had a left great toe and second toe amputated currently wounds open about 5 x 4 cm. Tissue bed has reasonable blood flow. Patient will need a wound VAC upon discharge. So I will talk to wound team wound VAC arrangement. I also looked at sacral wound. Patient has a fairly large black eschar rest wound at least 10 x 20 cm. 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 Requiring surgical wound debridement. Patient's daughter was at bedside. We talked about wound management including surgical wound debridement. She is agreeable.     Patient will also need eventual colostomy placement for fecal diversion in order for wound to heal.  However patient would not tolerate anesthesia at this time. So I do not particular recommend colostomy at this time. I will arrange for excisional sacral wound debridement later today.

## 2022-04-21 NOTE — PROGRESS NOTES
Comprehensive Nutrition Assessment    Type and Reason for Visit: (P) Reassess (Interim)    Nutrition Recommendations/Plan:   Continue Glucerna 60mL/hr continuous via PEG, decrease H2O flushes to [id number] and add 1pkt prosource/day for wound healing  If bolus is desired, consider: 250mL Glucerna 1.5 6x/day f/b 200mL H2O flush +1pkt prosource (meets =>100% of needs)  Advance diet as medically able  Monitor TF rate, flushes, GRVs, and Bms in I/Os     Malnutrition Assessment:  Malnutrition Status:  No malnutrition (04/21/22 1328)    Context:  Chronic illness     Findings of the 6 clinical characteristics of malnutrition:   Energy Intake:  No significant decrease in energy intake  Weight Loss:  No significant weight loss     Body Fat Loss:  No significant body fat loss,     Muscle Mass Loss:  No significant muscle mass loss,    Fluid Accumulation:  No significant fluid accumulation,     Strength:  Not performed     Nutrition Assessment:    (P) Admitted for AMS 2/2 UTI. RD familiar with pt from last admit for CVA c/b by hematuria and gangrenous L foot. PEG in place, TF started (4/4). Hypertensive and tachypnic this AM, transferred to ICU. Per ICU RN, pt TF held all day d/t concern for aspiration pna aeb fever (102.2F Tmax) though CXR shows lungs are clear. Discussed restarting TF as able, no changes in regimen. Noted that TF not advanced past 50ml/hr while pt on medical unit, unsure why. (4/11) Pt in bed on medical floor s/p transfer from ICU today; TF to resume s/p meds administration per Nsg. Noted water flushes increased to 350 mL Q4H w/o sig. change in lytes and TF remains as Glucerna and SSI; consider Diabetes Insipidus vs dehydration. Observed on IVF(D5)? Will adjust flush and monitor lytes. (4/14)TF running as Gluc 1.5 at goal rate w/o report of intolerance. TF continues to meet >80% of EENs. Consistent elevated BGs trended- D5 noted to be increased? Discussed w/ Nephro, SSI to be adjusted for BG management. RD rec's continuing regimen, manage BGs w/ SSI. (4/18) PEG noted to be displaced during care, plan to replace today. (4/21) PEG replaced, feeds resumed, no issues tolerating. Underwent L great toe and 2nd toe amputation today. Possible plan for eventual colostomy once pt can tolerate anesthesia. Will provide bolus rec's for discharge. Labs: Na 138, K 5.8, BUN 26, Creat 0.78, Gluc 313, Alb 1.6. Meds: atorvastatin, ferrous sulfate, furosemide, insulin glargine, insulin lispro. Nutrition Related Findings:    (P) Nourished per NFPE. No n/v, d/c. Last BM 4/20. No edema. Wound Type: (P) Deep tissue injury,Unstageable,Moisture associate skin damage,Diabetic ulcer,Surgical incision    Current Nutrition Intake & Therapies:  Average Meal Intake: (P) NPO  Average Supplement Intake: (P) NPO  ADULT TUBE FEEDING PEG; Diabetic; Delivery Method: Continuous; Continuous Initial Rate (mL/hr): 60; Continuous Advance Tube Feeding: No; Water Flush Volume (mL): 220; Water Flush Frequency: Q 4 hours  DIET NPO    Anthropometric Measures:  Height: (P) 5' 7.99\" (172.7 cm)  Ideal Body Weight (IBW): (P) 140 lbs ((P) 64 kg)  Admission Body Weight: 178 lb 5.6 oz (4/4)  Current Body Wt:  (P) 86.6 kg (190 lb 14.7 oz), (P) 136.4 % IBW. (P) Bed scale  Current BMI (kg/m2): (P) 29  Usual Body Weight:  (elkin)  Weight Adjustment: (P) No adjustment  BMI Category: (P) Overweight (BMI 25.0-29. 9)    Estimated Daily Nutrient Needs:  Energy Requirements Based On: (P) Kcal/kg  Weight Used for Energy Requirements: (P) Current  Energy (kcal/day): (P) [id number] (25-30kcals/kg consider bedbound 2/2 CVA and wounds)  Weight Used for Protein Requirements: (P) Current  Protein (g/day): (P) 130g (1.5g/kg - wounds)  Method Used for Fluid Requirements: (P) ml/kg  Fluid (ml/day): (P) 2165mL (25mL/kg)    Nutrition Diagnosis:   (P) Biting/chewing (masticatory) difficulty,Swallowing difficulty related to (P) cognitive or neurological impairment as evidenced by (P) nutrition support-enteral nutrition    Nutrition Interventions:   Food and/or Nutrient Delivery: (P) Continue NPO,Continue tube feeding  Nutrition Education/Counseling: (P) Education not indicated  Coordination of Nutrition Care: (P) Continue to monitor while inpatient,Feeding assistance/environmental change,Speech therapy     Goals:  Previous Goal Met: (P) Goal(s) achieved  Goals: (P) Meet at least 75% of estimated needs,Tolerate nutrition support at goal rate,by next RD assessment    Nutrition Monitoring and Evaluation:   Behavioral-Environmental Outcomes: (P) None identified  Food/Nutrient Intake Outcomes: (P) Enteral nutrition intake/tolerance  Physical Signs/Symptoms Outcomes: (P) Biochemical data,Nutrition focused physical findings,Weight,Skin    Discharge Planning:    (P) Too soon to determine    Lalitha Mckeon, HERI  Contact: [phone]

## 2022-04-21 NOTE — PROGRESS NOTES
Patient back from PACU. Pt is easily arousable. No complaints of pain. VSS. Sacral dressing in place WDL. Tube feed restarted. Dressing change done to the L foot as ordered. Daughters at the bedside discussing patient's condition with Dr William Nowak.

## 2022-04-21 NOTE — PROGRESS NOTES
Consult received. Pt off the floor for procedure. Will plan to follow-up 4- if pt able to participate.

## 2022-04-22 NOTE — PROGRESS NOTES
PROGRESS NOTE    Patient: Serafin Paige MRN: [id number]  SSN: [id number]    YOB: 1945  Age: 78 y.o. Sex: female      Admit Date: 4/3/2022    LOS: 18 days       Subjective     Chief Complaint   Patient presents with    Altered mental status       HPI: Patient is a 78y.o. 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 04/05   Patient is seen at bedside with daughter and nephew today. Patient is in distress due to pain and daughter notes that patient gets Neurontin TID for neuropathy daily. Otherwise, patient is still receiving enteric feeding through PEG tube and getting IVF. Patient was seen by nephrology yesterday. Recommends obtaining renal panel and continuing IVF. Patient was seen by ID yesterday. Recommends continuing IV vancomycin for urosepsis. Patient was seen by pulmonology today. Recommends PRBC transfusion. Labs indicate WBC 16.9, Hgb 6.3, Na 148, Cl 120, BUN 72, Cr 1.71, Ca 8, CRP 28.4, pro-carrie 6.93 and .       04/06   Patient is seen at bedside. Patient received 2 units of PRBC. Patient is on Zosyn. No new changes today. Patient seen by the vascular surgeon he recommend great toe amputation  And for sacral wound recommend Medihoney ointment     Labs show increasing Hgb of 9, decreasing WBC 14.7, Na 147, , BUN 55,   Cr 1.47, and CRP 21.4. CXR is unremarkable. 04/07  Patient is seen resting at bedside.  Patient received one unit of PRBC with increased Hgb of 10.6. No acute changes today. Patient is seen by nephrology who recommends free water flushes to correct hypernatremia. Labs show increasing Hgb of 10.6, WBC 15.1, pro-calcitonin 3.78, and CRP 16.1.    4/8    Patient had a rapid response this morning ABG and chest x-ray was ordered  Patient tachycardic tachypneic  ABG  pH 7.51 PCO2 31 PO2 65 bicarb 26 oxygen saturation 95% on room air    Chest x-ray shows no much changes    4/9    Patient resting the bed open eyes  On room air/breathing through the mouth    4/10    Patient open eyes not in distress breathing through her mouth  According to the nurse patient not sleep all day and all night yesterday      4/11  Patient is seen at bedside. She is awake but not in any distress. Patient is on zosyn. PEG tube in place. Urine culture positive for Pseudomonas. CXR shows no focal changes. Labs remarkable for WBC  22.3, Hgb 10.5, Na 153, , Cr 1.55 BUN 94, pro-calcitonin 1.64, and CRP 3.33.    4/12  Patient is on Vancomycin, zosyn and D5W. PEG tube in place. Spoke to daughter at bedside. She has no new complaints. Labs remarkable for:  WBC  18.9,  Cr 1.33, BUN 84,  and CRP 2.33 which are decreasing. Hgb 11.2, Na-corrected 158, , and pro-calcitonin 1.93 which are increasing. Seen by pulmonology. No new recommendations. Seen by GI. No new recommendations. Seen by nephrology. recommends IV D5W IVF to decrease hypernatremia. Seen by ID. Recommends discontinuing fluconazole, follow up blood cultures, and continuing IV zosyn and vancomycin. 4/13/2022  Patient is on Vancomycin, zosyn and D5W. PEG tube in place. Nephrology - recommends IV D5W IVF to decrease hypernatremia. Increase glargine to 25 units twice daily to correct hyperglycemia.  Once the D5W is discontinued, the dose of glargine will have to be decreased    ID - Continue IV Zosyn and Vancomycin; discontinue Vancomycin if WBC continues to decrease    Pulmonology - No new recommendations. GI - No new recommendations. CXR 4/11/2022 - No acute pulmonary process (no focal changes)     Labs remarkable for:  Blood Glucose 380  WBC  19.8  Hgb 10.3   Na-corrected 154  BUN 69  Cr 1.22  CRP 3.14  pro-calcitonin 2.28     4/14  Patient's family members are present at bedside with the patient. They notes that the patient was in distress this morning. Patient still on vancomycin and zosyn. PEG tube in place and patient is receiving enteral feedings. Seen by vascular surgeon who recommends great toe amputation. Labs remarkable for Na-corrected 154 improving, Cr 1.19 improving, pro-carrie 2.42,  and CRP 7.65.    4/15  Patient is seen grunting and restless at bedside. Daughter is at bedside. She has no new complaints. UA on 4/14 still positive for WBC>100, leukocyte esterase and grew yeast.  Labs remarkable for WBC 15.9 decreasing, Hb 9.5, Na-corrected 154, and CRP 7.99. Patient seen by ID. Recommends continuing zosyn and discontinuing vancomycin. 4/18  Patient is seen at bedside. She is awake and alert x3. She is able to say a few words this morning. She has no new complaints. She is on zosyn and fluconazole. PEG tube is clogged. Seen by vascular surgeon. Recommends great toe and second toe amputation. Awaiting for consent from daughter. Labs remarkable for WBC 11.4 decreasing, Hb 9.8, Na 145, and pro-calcitonin 2.01.    4/19  Patient was talking this morning and has no new complaints today. Patient is still receiving fluconazole. PEG tube in place and working. Labs remarkable for WBC 12.9, Hgb 9.1, and CRP 7.68. Seen by ID. Recommends transitioning to oral fluconazole 200 mg daily for 11 more days. 4/20  Patient is seen at bedside with family members. Patient is getting her left great toe and second toe amputation today. No new changes observed today.      Labs remarkable for WBC 11.6, Hgb 8.9, CRP 7.05 which are all improving.     4/21  Patient tolerated left great toe and second toe amputation well. She is seen in mild distress due to pain from the surgery. Surgeon recommends wound VAC on discharge. He also Recommends eventual colostomy but notes that patient would not tolerate anesthesia at this time. Labs remarkable for WBC 13.3, Hgb 9.4 improving, K 5.8, and CRP 8.28.    4/22  Patient is s/p left great and second toe amputations and sacral wound debridement. Patient has no new complaints today. Patient will get a wound vac for her sacrum and toes outpatient. Labs remarkable for WBC 17, CRP 4.94 and Hgb 9.4. Seen by nephrology. No new recommendations. Seen by ID. Recommend transitioning to oral fluconazole 200 for 9 more days, follow up sacral cultures, possibly start IV meropenem pending wound culture results. Review of Systems   Unable to perform ROS: Acuity of condition     Unable to obtain. Objective     Visit Vitals  BP (!) 151/61 (BP 1 Location: Left upper arm, BP Patient Position: At rest)   Pulse 76   Temp 98.6 °F (37 °C)   Resp 16   Ht 5' 7.99\" (1.727 m)   Wt 191 lb (86.6 kg)   SpO2 98%   BMI 29.05 kg/m²    O2 Flow Rate (L/min): 0 l/min O2 Device: None (Room air)    Physical Exam:   Physical Exam  Constitutional: Open eyes do not follow any commands. Tracks with eyes. HENT:   Head: Normocephalic and atraumatic. Eyes: Pupils are equal, round, and reactive to light. EOM are normal.   Cardiovascular: Normal rate, regular rhythm and normal heart sounds. Pulmonary/Chest: Decreased breath sound   abdominal: Soft. Bowel sounds are normal. There is no abdominal tenderness. There is no rebound and no guarding. Musculoskeletal: Normal range of motion. Neurological: Open eyes do not follow any, right-sided weakness  PEG tube in place. Intake & Output:  Current Shift: No intake/output data recorded.   Last three shifts: 04/20 1901 - 04/22 0700  In: 2170 [I.V.:250]  Out: Mary 66 [[facility]:[id number]]    Lab/Data Review:  Lab results reviewed. For significant abnormal values and values requiring intervention, see assessment and plan.        24 Hour Results:    Recent Results (from the past 24 hour(s))   GLUCOSE, POC    Collection Time: 04/21/22 11:32 AM   Result Value Ref Range    Glucose (POC) 302 (H) 65 - 117 mg/dL    Performed by Radha Abdi, POC    Collection Time: 04/21/22  3:45 PM   Result Value Ref Range    Glucose (POC) 290 (H) 65 - 117 mg/dL    Performed by [address] [place] [id number], POC    Collection Time: 04/21/22  6:08 PM   Result Value Ref Range    Glucose (POC) 285 (H) 65 - 117 mg/dL    Performed by Wilda Heimlich    GLUCOSE, POC    Collection Time: 04/21/22  7:35 PM   Result Value Ref Range    Glucose (POC) 300 (H) 65 - 117 mg/dL    Performed by [id number], BASIC    Collection Time: 04/21/22  7:58 PM   Result Value Ref Range    Sodium 137 136 - 145 mmol/L    Potassium 5.6 (H) 3.5 - 5.1 mmol/L    Chloride 109 (H) 97 - 108 mmol/L    CO2 24 21 - 32 mmol/L    Anion gap 4 (L) 5 - 15 mmol/L    Glucose 291 (H) 65 - 100 mg/dL    BUN 26 (H) 6 - 20 mg/dL    Creatinine 0.78 0.55 - 1.02 mg/dL    BUN/Creatinine ratio 33 (H) 12 - 20      GFR est AA >60 >60 ml/min/1.73m2    GFR est non-AA >60 >60 ml/min/1.73m2    Calcium 8.7 8.5 - 10.1 mg/dL   GLUCOSE, POC    Collection Time: 04/21/22 11:52 PM   Result Value Ref Range    Glucose (POC) 332 (H) 65 - 117 mg/dL    Performed by Destiny Robledo, POC    Collection Time: 04/22/22  4:55 AM   Result Value Ref Range    Glucose (POC) 283 (H) 65 - 117 mg/dL    Performed by [facility]    METABOLIC PANEL, COMPREHENSIVE    Collection Time: 04/22/22  6:42 AM   Result Value Ref Range    Sodium 142 136 - 145 mmol/L    Potassium 4.9 3.5 - 5.1 mmol/L    Chloride 110 (H) 97 - 108 mmol/L    CO2 27 21 - 32 mmol/L    Anion gap 5 5 - 15 mmol/L    Glucose 233 (H) 65 - 100 mg/dL    BUN 33 (H) 6 - 20 mg/dL Creatinine 0.88 0.55 - 1.02 mg/dL    BUN/Creatinine ratio 38 (H) 12 - 20      GFR est AA >60 >60 ml/min/1.73m2    GFR est non-AA >60 >60 ml/min/1.73m2    Calcium 8.5 8.5 - 10.1 mg/dL    Bilirubin, total 0.2 0.2 - 1.0 mg/dL    AST (SGOT) 29 15 - 37 U/L    ALT (SGPT) 66 12 - 78 U/L    Alk. phosphatase 92 45 - 117 U/L    Protein, total 6.5 6.4 - 8.2 g/dL    Albumin 1.8 (L) 3.5 - 5.0 g/dL    Globulin 4.7 (H) 2.0 - 4.0 g/dL    A-G Ratio 0.4 (L) 1.1 - 2.2     CBC WITH AUTOMATED DIFF    Collection Time: 04/22/22  6:42 AM   Result Value Ref Range    WBC 17.1 (H) 3.6 - 11.0 K/uL    RBC 3.33 (L) 3.80 - 5.20 M/uL    HGB 9.4 (L) 11.5 - 16.0 g/dL    HCT 29.7 (L) 35.0 - 47.0 %    MCV 89.2 80.0 - 99.0 FL    MCH 28.2 26.0 - 34.0 PG    MCHC 31.6 30.0 - 36.5 g/dL    RDW 16.6 (H) 11.5 - 14.5 %    PLATELET 182 456 - 767 K/uL    MPV 11.7 8.9 - 12.9 FL    NRBC 0.0 0.0  WBC    ABSOLUTE NRBC 0.00 0.00 - 0.01 K/uL    NEUTROPHILS 88 (H) 32 - 75 %    LYMPHOCYTES 9 (L) 12 - 49 %    MONOCYTES 2 (L) 5 - 13 %    EOSINOPHILS 0 0 - 7 %    BASOPHILS 0 0 - 1 %    IMMATURE GRANULOCYTES 1 (H) 0 - 0.5 %    ABS. NEUTROPHILS 15.2 (H) 1.8 - 8.0 K/UL    ABS. LYMPHOCYTES 1.5 0.8 - 3.5 K/UL    ABS. MONOCYTES 0.4 0.0 - 1.0 K/UL    ABS. EOSINOPHILS 0.0 0.0 - 0.4 K/UL    ABS. BASOPHILS 0.0 0.0 - 0.1 K/UL    ABS. IMM. GRANS. 0.1 (H) 0.00 - 0.04 K/UL    DF AUTOMATED     C REACTIVE PROTEIN, QT    Collection Time: 04/22/22  6:42 AM   Result Value Ref Range    C-Reactive protein 4.94 (H) 0.00 - 0.60 mg/dL         Imaging:    XR ABD (KUB)   Final Result   Percutaneous gastrostomy tube tip in the gastric antrum. XR CHEST PORT   Final Result   No acute pulmonary process. XR CHEST PORT   Final Result   No significant change. XR CHEST PORT   Final Result      XR CHEST PORT   Final Result   No acute cardiopulmonary abnormality. .       XR CHEST PORT   Final Result      CT HEAD WO CONT   Final Result   Chronic changes which appear stable.     No acute or active intracranial process. Chronic paranasal sinus disease         XR CHEST PORT   Final Result   No acute findings. Assessment   Severe sepsis with UTI  UTI/Pseudomonas  Acute on chronic kidney disease  Hyperkalemia  Hypernatremia, improving  CVA with right-sided weakness  Anemia of chronic disease  Gangrene of the left great toe-s/p removal of left great and second toe  Sacral decubitus ulcer for debridement of the wound today  Acute respiratory failure  Possible aspiration  Elevated BNP  PEG tube malfunction/seen by the GI now working      Plan     Amlodipine 5 mg daily  Aspirin 81 mg daily  Lipitor 20 daily  Ferrous sulfate 300 twice a day  Gabapentin 300 mg once in the morning and at bedtime  Heparin 5000 units subcu every 8 hours  Hydralazine 50 mg 3 times a day  Lantus 22 units subcu twice a day  Metoprolol 50 mg twice a day  IV Zosyn 3.375 IV every 8 hours  Tramadol 50 mg Q6 PRN- increase to 100 mg  Discontinue Vancomycin  Flucanazole 100 mg daily  Monitor renal function and sodium level. Pulmonary nephrology infectious disease and vascular surgical.  Amputation of the left great toe and second toe today. Start back on gabapentin at night only. 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 Requiring surgical wound debridement. Kayexalate 30. Await wound culture and start IV meropenem pending results. Possible discharge in 24 to 48 hours.            Current Facility-Administered Medications:     meropenem (MERREM) 1 g in 0.9% sodium chloride (MBP/ADV) 50 mL IVPB, 1 g, IntraVENous, Q8H, Peggy Monzon MD, Last Rate: 16.7 mL/hr at 04/22/22 0506, 1 g at 04/22/22 0506    traMADoL (ULTRAM) tablet 50 mg, 50 mg, Oral, Q6H PRN, Anneliese Romo MD, 50 mg at 04/22/22 0506    fluconazole (DIFLUCAN) 200mg/100 mL IVPB (premix), 200 mg, IntraVENous, Q24H, Minh Mensah MD, Last Rate: 100 mL/hr at 04/21/22 1351, 200 mg at 04/21/22 1351    gabapentin (NEURONTIN) capsule 300 mg, 300 mg, Oral, BID, Israel Soto MD, 300 mg at 04/21/22 2053    insulin glargine (LANTUS) injection 32 Units, 32 Units, SubCUTAneous, BID, Israel Soto MD, 32 Units at 04/21/22 2054    artificial saliva (MOUTH KOTE) 1 Spray, 1 Spray, Oral, PRN, Israel Soto MD, 1 Spray at 04/15/22 1053    amLODIPine (NORVASC) tablet 5 mg, 5 mg, Oral, DAILY, Israel Soto MD, 5 mg at 04/20/22 1011    metoprolol tartrate (LOPRESSOR) tablet 50 mg, 50 mg, Oral, BID, Israel Soto MD, 50 mg at 04/21/22 2053    nitroglycerin (NITROBID) 2 % ointment 1 Inch, 1 Inch, Topical, Q6H, Lev Sagastume MD, 1 Inch at 04/22/22 0506    hydrALAZINE (APRESOLINE) tablet 50 mg, 50 mg, Oral, TID, Leon Jon MD, 50 mg at 04/21/22 2054    [Held by provider] furosemide (LASIX) injection 40 mg, 40 mg, IntraVENous, DAILY, Leon Jon MD, 40 mg at 04/09/22 1054    labetaloL (NORMODYNE;TRANDATE) 20 mg/4 mL (5 mg/mL) injection 20 mg, 20 mg, IntraVENous, Q4H PRN, Leon oJn MD, 20 mg at 04/15/22 0255    hydrALAZINE (APRESOLINE) 20 mg/mL injection 20 mg, 20 mg, IntraVENous, Q6H PRN, Leon Jon MD, 20 mg at 04/22/22 0506    0.9% sodium chloride infusion 250 mL, 250 mL, IntraVENous, PRN, Martínez Crespo MD    insulin lispro (HUMALOG) injection, , SubCUTAneous, Q6H, Israel Soto MD, 7 Units at 04/22/22 0506    atorvastatin (LIPITOR) tablet 20 mg, 20 mg, Oral, QHS, Israel Soto MD, 20 mg at 04/21/22 2054    latanoprost (XALATAN) 0.005 % ophthalmic solution 1 Drop, 1 Drop, Right Eye, QPM, Israel Soto MD, 1 Drop at 04/21/22 1035    aspirin delayed-release tablet 81 mg, 81 mg, Oral, DAILY, Israel Soto MD, 81 mg at 04/19/22 0900    brimonidine (ALPHAGAN) 0.2 % ophthalmic solution 1 Drop, 1 Drop, Both Eyes, TID, Leon Jon MD, 1 Drop at 04/21/22 2054    glucose chewable tablet 16 g, 4 Tablet, Oral, PRN, Eunice Soto MD    glucagon [facility]) injection 1 mg, 1 mg, IntraMUSCular, PRN, Israel Soto MD, 1 mg at 04/18/22 1145    [DISCONTINUED] acetaminophen (TYLENOL) tablet 650 mg, 650 mg, Oral, Q6H PRN, 650 mg at 04/04/22 2246 **OR** acetaminophen (TYLENOL) suppository 650 mg, 650 mg, Rectal, Q6H PRN, Eunice Soto MD    polyethylene glycol (MIRALAX) packet 17 g, 17 g, Oral, DAILY PRN, Eunice Soto MD    ondansetron (ZOFRAN ODT) tablet 4 mg, 4 mg, Oral, Q8H PRN **OR** ondansetron (ZOFRAN) injection 4 mg, 4 mg, IntraVENous, Q6H PRN, Israel Soto MD    heparin (porcine) injection 5,000 Units, 5,000 Units, SubCUTAneous, Q8H, Israel Soto MD, 5,000 Units at 04/22/22 0009    albuterol-ipratropium (DUO-NEB) 2.5 MG-0.5 MG/3 ML, 3 mL, Nebulization, Q6H PRN, Eunice Soto MD    ferrous sulfate 300 mg (60 mg iron)/5 mL oral syrup 300 mg, 300 mg, Oral, BID, Israel Soto MD, 300 mg at 04/21/22 2054    acetaminophen (TYLENOL) solution 650 mg, 650 mg, Per NG tube, Q6H PRN, Israel Soto MD, 650 mg at 04/19/22 8262    Current Outpatient Medications   Medication Instructions    acidophilus-pectin, citrus 25 million cell -100 mg tab tablet 2 Tablets, Oral, DAILY    albuterol-ipratropium (DUO-NEB) 2.5 mg-0.5 mg/3 ml nebu 3 mL, Nebulization, EVERY 6 HOURS AS NEEDED    amLODIPine (NORVASC) 5 mg, Oral, DAILY    artificial saliva (MOUTH KOTE) spra 1 Spray, Oral, 3 TIMES DAILY    aspirin delayed-release 81 mg tablet Oral, DAILY    atorvastatin (LIPITOR) 20 mg tablet TAKE 1 TABLET BY MOUTH EVERY NIGHT    brimonidine (ALPHAGAN) 0.2 % ophthalmic solution 1 Drop, Both Eyes, 3 TIMES DAILY    ferrous sulfate 325 mg, Oral, 2 TIMES DAILY    fluconazole (DIFLUCAN) 200 mg, Oral, DAILY, Fluconazole 200 mg daily for 11 days    gabapentin (NEURONTIN) 300 mg, Oral, 2 TIMES DAILY    hydrALAZINE (APRESOLINE) 50 mg, Oral, 3 TIMES DAILY    insulin glargine (LANTUS) 50 Units, SubCUTAneous, DAILY    latanoprost (XALATAN) 0.005 % ophthalmic solution 1 Drop, Right Eye, EVERY EVENING    lidocaine (XYLOCAINE) 4 % (40 mg/mL) topical solution 1 mL, Topical, AS NEEDED    metoprolol succinate (TOPROL-XL) 50 mg, Oral, 2 TIMES DAILY    metoprolol tartrate (LOPRESSOR) 50 mg, Oral, 2 TIMES DAILY         Signed By: Latia Sat     April 22, 2022

## 2022-04-22 NOTE — ANESTHESIA POSTPROCEDURE EVALUATION
Procedure(s):  SACRAL DEBRIDEMENT.     general    Anesthesia Post Evaluation      Multimodal analgesia: multimodal analgesia used between 6 hours prior to anesthesia start to PACU discharge  Patient location during evaluation: PACU  Patient participation: complete - patient participated  Level of consciousness: awake  Pain score: 0  Pain management: adequate  Airway patency: patent  Anesthetic complications: no  Cardiovascular status: acceptable  Respiratory status: acceptable  Hydration status: acceptable  Post anesthesia nausea and vomiting:  controlled  Final Post Anesthesia Temperature Assessment:  Normothermia (36.0-37.5 degrees C)      INITIAL Post-op Vital signs:   Vitals Value Taken Time   /59 04/21/22 1725   Temp 36.3 °C (97.3 °F) 04/21/22 1710   Pulse 74 04/21/22 1725   Resp 16 04/21/22 1725   SpO2 99 % 04/21/22 1725

## 2022-04-22 NOTE — OP NOTES
This is operative report on Monika Helton, patient's YOB: 1945. Date of surgery: April 21, 2022    Surgeon: Dr. Hannah Child diagnosis:  Large sacral decubitus ulcer unstageable. Postop diagnosis:  Large sacral decubitus ulcer stage III. Procedure:  Excisional sacral wound debridement involving skin subcutaneous tissue and fat size 14 x 12 cm. Anesthesia: Monitored anesthesia care with local anesthetic. Anesthesiologist:   Assistant: None  EBL: Minimal  Complication: None  Specimen: Excised the sacral wound sent to microbiology  Implants: None    Indication for surgery: Ms. Brynana Patel currently has unstageable sacral decubitus ulcer with a black eschar on the sacrum with a size approximately 14 x 12 cm. Patient's family was discussed surgical wound debridement, rational for the surgery and postop wound care in details. Patient's family has signed surgical consent. Description of procedure: Patient was brought to the operating table comfortable supine position. Followed by monitored anesthesia care was initiated. Followed by patient is appropriate positioned to left lateral decubitus position. Sacrum was exposed. Betadine solution was used to prep the area. Followed by sterile drapes of procedure patient. At this time timeout was called after confirmation was made procedure commenced. Using #11 blade skin incision made at the junction of the healthy and necrotic tissue circumferentially full-thickness. Followed by eschar was tissue was elevated from underlying fat tissue using electro Bovie cautery. Excised wounds measured 12 x 14 cm. Muscle tissue is not exposed. After hemostasis obtained open wounds are covered with iodine soaked 4 x 4 gauze and Kerlix and ABD pads. Patient tolerated procedure well without complication.

## 2022-04-22 NOTE — PROGRESS NOTES
PROGRESS NOTE      Chief Complaints:  Patient voices no new complaints. HPI and  Objective:    Patient is awake. No fever. Rest of review of system unable to review  Review of Systems:  Unable to review due to mental status  EXAM:  Visit Vitals  BP (!) 151/61 (BP 1 Location: Left upper arm, BP Patient Position: At rest)   Pulse 76   Temp 98.6 °F (37 °C)   Resp 16   Ht 5' 7.99\" (1.727 m)   Wt 191 lb (86.6 kg)   SpO2 98%   BMI 29.05 kg/m²       Patient is awake   Head and neck atraumatic, normocephalic. ENT: No hoarse voice  Cardiac system regular rate rhythm. Pulmonary no audible wheeze  Chest wall excursion normal with respiration cycle  Abdomen is soft not particularly distended. Neurologically nonfocal.  Skin is warm and moist.  Psychosocial: Cooperative. Vascular examination as previously noted no changes.     Recent Results (from the past 24 hour(s))   GLUCOSE, POC    Collection Time: 04/21/22 11:32 AM   Result Value Ref Range    Glucose (POC) 302 (H) 65 - 117 mg/dL    Performed by [employer], POC    Collection Time: 04/21/22  3:45 PM   Result Value Ref Range    Glucose (POC) 290 (H) 65 - 117 mg/dL    Performed by [facility] edPULSE, POC    Collection Time: 04/21/22  6:08 PM   Result Value Ref Range    Glucose (POC) 285 (H) 65 - 117 mg/dL    Performed by [employer], POC    Collection Time: 04/21/22  7:35 PM   Result Value Ref Range    Glucose (POC) 300 (H) 65 - 117 mg/dL    Performed by [id number], BASIC    Collection Time: 04/21/22  7:58 PM   Result Value Ref Range    Sodium 137 136 - 145 mmol/L    Potassium 5.6 (H) 3.5 - 5.1 mmol/L    Chloride 109 (H) 97 - 108 mmol/L    CO2 24 21 - 32 mmol/L    Anion gap 4 (L) 5 - 15 mmol/L    Glucose 291 (H) 65 - 100 mg/dL    BUN 26 (H) 6 - 20 mg/dL    Creatinine 0.78 0.55 - 1.02 mg/dL    BUN/Creatinine ratio 33 (H) 12 - 20      GFR est AA >60 >60 ml/min/1.73m2    GFR est non-AA >60 >60 ml/min/1.73m2 Calcium 8.7 8.5 - 10.1 mg/dL   GLUCOSE, POC    Collection Time: 04/21/22 11:52 PM   Result Value Ref Range    Glucose (POC) 332 (H) 65 - 117 mg/dL    Performed by Joao Jasso, POC    Collection Time: 04/22/22  4:55 AM   Result Value Ref Range    Glucose (POC) 283 (H) 65 - 117 mg/dL    Performed by BETO FARIAS        ASSESSMENT:   Patient is 78 y.o. with diagnosis of : Active Problems:    Hyperkalemia (4/4/2022)      UTI (urinary tract infection) (4/4/2022)      Sepsis ([facility] (4/4/2022)      RICARDO (acute kidney injury) ([facility] (4/4/2022)      AMS (altered mental status) (4/4/2022)        PLAN:                 Patient will need a wet-to-dry saline gauze packing changes twice daily to left foot open wound and sacral area. Patient will need wound VAC application left foot. And possibly wound VAC on the sacrum upon discharge. Wounds are fairly clean both locations. Will follow.

## 2022-04-22 NOTE — PROGRESS NOTES
CM discussed discharge planning with daughters at patient bedside. Patent will require SNF at discharge. Choice letter reviewed, signed and placed on chart. Choices: [facility]. Referrals Sent.

## 2022-04-22 NOTE — PROGRESS NOTES
SPEECH LANGUAGE PATHOLOGY BEDSIDE SWALLOW EVALUATION  Patient: Oliverio Pablo (89 y.o. female)  Date: 4/22/2022  Primary Diagnosis: Sepsis ([facility] [A41.9]  UTI (urinary tract infection) [N39.0]  AMS (altered mental status) [R41.82]  RICARDO (acute kidney injury) ([facility] [N17.9]  Hyperkalemia [E87.5]  Procedure(s) (LRB):  SACRAL DEBRIDEMENT (N/A) 1 Day Post-Op   Precautions: aspiration       ASSESSMENT :  Based on the objective data described below, the patient presents with moderate oral and pharyngeal dysphagia placing her at risk for aspiration. Patient known to clinician from previous hospitalization. Patient w/ over-all improved alertness, communication and oral motor control. Per chart review, patient was NPO except ice chips, TF via PEG at residing facility. Oral care completed prior to p.o. trials. Patient w/ dry oral mucosa. Scabbing and dried secretions on tongue and soft palate. Oral motor c/b right facial paralysis. Patient w/ hx of bell's palsy. Incoordinated movements and reduced strength of lingual and labial musculature. Reduced lingual elevation and lateralization. Patient unable to perform some oral motor movements due to underlying apraxia    Administered ice, chips, thin, moderately thick and pudding to trial. Reduced labial closure right side around spoon. Improved bolus pull from spoon. Slow bolus manipulation, tongue pumping. Delay in the initiation of swallow for all consistencies. Once swallow initiated, HLE and protraction reduced/weak to palpation. No overt s/sx of aspiration noted w/ all consistencies administered. Patient will benefit from skilled intervention to address the above impairments. Patients rehabilitation potential is considered to be Good     PLAN :  Recommendations and Planned Interventions:  NPO except ice chips. Continue TF via PEG. RD to adjust to bolus feeds. P.O. trials w/ SLP only. Continue w/ oral care daily. Will plan for MBS, Monday 4/25/22 for p.o. safety and possible diet initiation. Full speech-language evaluation as indicated. SLP to continue to follow. Frequency/Duration: Patient will be followed by speech-language pathology daily to address goals. Discharge Recommendations: To Be Determined     SUBJECTIVE:   Patient alert and seen at bedside. Nsg assisted w/ repositioning for swallow evaluation. Patient A & O x 3. Answering questions, asking appropriate questions. Vocal quality low. OBJECTIVE:     CXR Results  (Last 48 hours)      None           CT Results  (Last 48 hours)      None             Past Medical History:   Diagnosis Date    Diabetes mellitus ([facility] [address]     HTN (hypertension)     Hyperlipidemia     Neuropathy     PAD (peripheral artery disease) ([facility])     PCI    Sciatica      Past Surgical History:   Procedure Laterality Date    HX AMPUTATION Right     great toe    HX CERVICAL FUSION      HX OTHER SURGICAL Bilateral     Stent placement    HX OTHER SURGICAL      HX VASCULAR STENT Bilateral     2 in right 1 in left    HX VASCULAR STENT Bilateral      Prior Level of Function/Home Situation:   Home Situation  Home Environment: Skilled nursing facility ([address] [place] [address])  One/Two Story Residence: Other (Comment) (SNF with elevator)  Living Alone: No  Support Systems: Child(nelida)  Patient Expects to be Discharged to[de-identified] Skilled nursing facility  Current DME Used/Available at Home: Hospital bed  Diet prior to admission: NPO  Current Diet:  NPO  Cognitive and Communication Status:  Neurologic State: Alert  Orientation Level: Oriented to person,Oriented to place,Oriented to time  Cognition: Follows commands           Swallowing Evaluation:   Oral Assessment:  Oral Assessment  Labial: Right droop; Decreased rate;Decreased seal;Impaired coordination  Dentition: Limited  Oral Hygiene: fair  Lingual: Decreased strength;Decreased rate  P.O. Trials:  Patient Position: upright in bed  Vocal quality prior to P.O.: Low volume  Consistency Presented: Ice chips; Thin liquid;Honey thick liquid;Puree  How Presented: Spoon     Bolus Acceptance: Impaired  Bolus Formation/Control: Impaired  Type of Impairment: Delayed;Lip closure  Propulsion: Delayed (# of seconds)  Oral Residue: None  Initiation of Swallow: Delayed (# of seconds)  Laryngeal Elevation: Weak  Aspiration Signs/Symptoms: None  Pharyngeal Phase Characteristics: Poor endurance       Oral Phase Severity: Moderate  Pharyngeal Phase Severity : Mild  Voice:           Vocal Quality: Low volume        Pain:  Pain Scale 1: Numeric (0 - 10)  Pain Intensity 1: 0  Pain Location 1: Coccyx    After treatment:   Patient left in no apparent distress in bed, Call bell within reach, and Nursing notified    COMMUNICATION/EDUCATION:   Patient was educated regarding purpose of SLP assessment, POC, diet recs and sw safety precautions. Patient demonstrated [address] understanding as evidenced by verbal understanding. The patient's plan of care including recommendations, planned interventions, and recommended diet changes were discussed with: Registered nurse. Patient/family have participated as able in goal setting and plan of care. Thank you for this referral.  BRYAN Ortiz M.S. CCC-SLP  Time Calculation: 30 mins           Problem: Dysphagia (Adult)  Goal: *Acute Goals and Plan of Care (Insert Text)  Description: Speech Therapy Swallow Goals  Initiated 4/22/2022        -Patient will tolerate PO trials without clinical indicators of aspiration given moderate cues within 5-7day(s). -Patient will participate in modified barium swallow study within 5-7 day(s). -Patient will demonstrate understanding of swallow safety precautions and aspiration precautions, diet recs with moderate cues within 5-7 day(s).     -Patient will participate in speech-language evaluation as indicated within 5-7 days          Outcome: Not Progressing Towards Goal

## 2022-04-22 NOTE — DISCHARGE SUMMARY
Discharge Summary       PATIENT ID: Olaton Mantle  MRN: [id number]   YOB: 1945    DATE OF ADMISSION: 4/3/2022 10:36 PM    DATE OF DISCHARGE:   PRIMARY CARE PROVIDER: Luis Hammer MD     ATTENDING PHYSICIAN: Roel Soto  DISCHARGING PROVIDER: Roel Soto      CONSULTATIONS: IP CONSULT TO NEPHROLOGY  IP CONSULT TO INFECTIOUS DISEASES  IP CONSULT TO NEPHROLOGY  IP CONSULT TO NEPHROLOGY  IP CONSULT TO GASTROENTEROLOGY  IP CONSULT TO GASTROENTEROLOGY  IP CONSULT TO PULMONOLOGY  IP CONSULT TO GENERAL SURGERY  IP CONSULT TO GENERAL SURGERY    PROCEDURES/SURGERIES: Procedure(s):  SACRAL DEBRIDEMENT    ADMITTING DIAGNOSES:    Patient Active Problem List    Diagnosis Date Noted    Hyperkalemia 04/04/2022    UTI (urinary tract infection) 04/04/2022    Sepsis ([address] 04/04/2022    RICARDO (acute kidney injury) ([address] 04/04/2022    AMS (altered mental status) 04/04/2022    CVA (cerebral vascular accident) ([address] 02/21/2022    Elevated troponin 02/21/2022    Ischemia of both lower extremities 07/22/2020    Pain in both lower legs 07/22/2020    Acute osteomyelitis of ankle or foot ([address] 04/04/2019    Diabetic peripheral neuropathy ([address] 04/04/2019    Spinal stenosis in cervical region 04/04/2019    Type 2 diabetes mellitus with nephropathy ([address] 01/31/2018    DM type 2 causing vascular disease ([address] 07/27/2017    Hypercalcemia 12/12/2016    PAD (peripheral artery disease) [facility] 04/11/2016    Infection of nailbed of toe of left foot 09/10/2015    Diabetes mellitus with neurological manifestations, uncontrolled 08/08/2013    HTN (hypertension)     Hyperlipidemia     Neuropathy     Sciatica        DISCHARGE DIAGNOSES / PLAN:      Severe sepsis with UTI  UTI/Pseudomonas  Acute on chronic kidney disease  Hyperkalemia  Hypernatremia, improving  CVA with right-sided weakness  Anemia of chronic disease  Gangrene of the left great toe  Sacral decubitus ulcer  Acute respiratory failure  Possible aspiration  Elevated BNP  PEG tube malfunction/seen by the GI now working   Gangrene of the left great toe-s/p removal of left great and second toe  Static post debridement of the sacral wound        DISCHARGE MEDICATIONS:  Current Discharge Medication List      START taking these medications    Details   albuterol-ipratropium (DUO-NEB) 2.5 mg-0.5 mg/3 ml nebu 3 mL by Nebulization route every six (6) hours as needed for Wheezing or Shortness of Breath. Qty: 30 Nebule, Refills: 1  Start date: 4/19/2022      hydrALAZINE (APRESOLINE) 50 mg tablet Take 1 Tablet by mouth three (3) times daily. Qty: 60 Tablet, Refills: 0  Start date: 4/19/2022      metoprolol tartrate (LOPRESSOR) 50 mg tablet Take 1 Tablet by mouth two (2) times a day. Qty: 60 Tablet, Refills: 0  Start date: 4/19/2022      fluconazole (DIFLUCAN) 100 mg tablet Take 2 Tablets by mouth daily for 7 days. Fluconazole 200 mg daily for 11 days  Qty: 11 Tablet, Refills: 0  Start date: 4/19/2022, End date: 4/26/2022         CONTINUE these medications which have NOT CHANGED    Details   gabapentin (NEURONTIN) 300 mg capsule Take 1 Capsule by mouth two (2) times a day. Max Daily Amount: 600 mg. Qty: 12 Capsule, Refills: 0    Associated Diagnoses: Pain      insulin glargine (LANTUS) 100 unit/mL injection 50 Units by SubCUTAneous route daily. Qty: 10 mL, Refills: 0      acidophilus-pectin, citrus 25 million cell -100 mg tab tablet Take 2 Tablets by mouth daily. Qty: 30 Tablet, Refills: 0      amLODIPine (NORVASC) 5 mg tablet Take 1 Tablet by mouth daily. Qty: 30 Tablet, Refills: 0      artificial saliva (MOUTH KOTE) spra Take 1 Spray by mouth three (3) times daily. Qty: 10 mL, Refills: 0      latanoprost (XALATAN) 0.005 % ophthalmic solution Administer 1 Drop to right eye every evening.   Qty: 10 mL, Refills: 0      atorvastatin (LIPITOR) 20 mg tablet TAKE 1 TABLET BY MOUTH EVERY NIGHT  Qty: 30 Tablet, Refills: 5    Associated Diagnoses: Type II or unspecified type diabetes mellitus with neurological manifestations, uncontrolled(250.62) ([facility] Essential hypertension; Hyperlipidemia, unspecified hyperlipidemia type; Vitamin D deficiency; Vitamin B12 deficiency; Neuropathy; Type 2 diabetes mellitus with hyperglycemia, with long-term current use of insulin ([facility] [facility] Diabetes mellitus with neurological manifestations, uncontrolled; Mixed hyperlipidemia; PAD (peripheral artery disease) ([facility] [address] [facility] Hypercalcemia; Uncontrolled type 2 diabetes mellitus with hyperglycemia, with long-term current use of insulin (HCC)      brimonidine (ALPHAGAN) 0.2 % ophthalmic solution Administer 1 Drop to both eyes three (3) times daily. aspirin delayed-release 81 mg tablet Take  by mouth daily. ferrous sulfate 325 mg (65 mg iron) tablet Take 1 Tab by mouth two (2) times a day. Qty: 60 Tab, Refills: 5    Associated Diagnoses: Diabetes mellitus with neurological manifestations, uncontrolled; Essential hypertension; PAD (peripheral artery disease) ([facility] [address] [facility] Mixed hyperlipidemia; Type 2 diabetes mellitus with hyperglycemia, with long-term current use of insulin ([facility] [address] Hypercalcemia; Uncontrolled type 2 diabetes mellitus with hyperglycemia, with long-term current use of insulin [facility] Type II or unspecified type diabetes mellitus with neurological manifestations, uncontrolled(250.62) ([address] Hyperlipidemia, unspecified hyperlipidemia type; Vitamin D deficiency; Vitamin B12 deficiency; Neuropathy         STOP taking these medications       lidocaine (XYLOCAINE) 4 % (40 mg/mL) topical solution Comments:   Reason for Stopping:         metoprolol succinate (TOPROL-XL) 50 mg XL tablet Comments:   Reason for Stopping:                 NOTIFY YOUR PHYSICIAN FOR ANY OF THE FOLLOWING:   Fever over 101 degrees for 24 hours. Chest pain, shortness of breath, fever, chills, nausea, vomiting, diarrhea, change in mentation, falling, weakness, bleeding.  Severe pain or pain not relieved by medications. Or, any other signs or symptoms that you may have questions about. DISPOSITION:  x  Home With:   OT  PT  HH  RN       Long term SNF/Inpatient Rehab    Independent/assisted living    Hospice    Other:       PATIENT CONDITION AT DISCHARGE: Stable      PHYSICAL EXAMINATION AT DISCHARGE:  General:          Alert, cooperative, no distress, appears stated age. HEENT:           Atraumatic, anicteric sclerae, pink conjunctivae                          No oral ulcers, mucosa moist, throat clear, dentition fair  Neck:               Supple, symmetrical  Lungs:             Clear to auscultation bilaterally. No Wheezing or Rhonchi. No rales. Chest wall:      No tenderness  No Accessory muscle use. Heart:              Regular  rhythm,  No  murmur   No edema  Abdomen:        Soft, non-tender. Not distended. Bowel sounds normal  Extremities:     No cyanosis. No clubbing,                            Skin turgor normal, Capillary refill normal  Skin:                Not pale. Not Jaundiced  No rashes   Psych:             Not anxious or agitated. Neurologic:      Alert, moves all extremities, answers questions appropriately and responds to commands     XR ABD (KUB)   Final Result   Percutaneous gastrostomy tube tip in the gastric antrum. XR CHEST PORT   Final Result   No acute pulmonary process. XR CHEST PORT   Final Result   No significant change. XR CHEST PORT   Final Result      XR CHEST PORT   Final Result   No acute cardiopulmonary abnormality. .       XR CHEST PORT   Final Result      CT HEAD WO CONT   Final Result   Chronic changes which appear stable. No acute or active intracranial process. Chronic paranasal sinus disease         XR CHEST PORT   Final Result   No acute findings.            Recent Results (from the past 24 hour(s))   GLUCOSE, POC    Collection Time: 04/21/22  3:45 PM   Result Value Ref Range    Glucose (POC) 290 (H) 65 - 117 mg/dL    Performed by Sole Brito GLUCOSE, POC    Collection Time: 04/21/22  6:08 PM   Result Value Ref Range    Glucose (POC) 285 (H) 65 - 117 mg/dL    Performed by Malinda Nicholson, POC    Collection Time: 04/21/22  7:35 PM   Result Value Ref Range    Glucose (POC) 300 (H) 65 - 117 mg/dL    Performed by [id number], BASIC    Collection Time: 04/21/22  7:58 PM   Result Value Ref Range    Sodium 137 136 - 145 mmol/L    Potassium 5.6 (H) 3.5 - 5.1 mmol/L    Chloride 109 (H) 97 - 108 mmol/L    CO2 24 21 - 32 mmol/L    Anion gap 4 (L) 5 - 15 mmol/L    Glucose 291 (H) 65 - 100 mg/dL    BUN 26 (H) 6 - 20 mg/dL    Creatinine 0.78 0.55 - 1.02 mg/dL    BUN/Creatinine ratio 33 (H) 12 - 20      GFR est AA >60 >60 ml/min/1.73m2    GFR est non-AA >60 >60 ml/min/1.73m2    Calcium 8.7 8.5 - 10.1 mg/dL   GLUCOSE, POC    Collection Time: 04/21/22 11:52 PM   Result Value Ref Range    Glucose (POC) 332 (H) 65 - 117 mg/dL    Performed by BETO FARIAS    GLUCOSE, POC    Collection Time: 04/22/22  4:55 AM   Result Value Ref Range    Glucose (POC) 283 (H) 65 - 117 mg/dL    Performed by BETO FARIAS    METABOLIC PANEL, COMPREHENSIVE    Collection Time: 04/22/22  6:42 AM   Result Value Ref Range    Sodium 142 136 - 145 mmol/L    Potassium 4.9 3.5 - 5.1 mmol/L    Chloride 110 (H) 97 - 108 mmol/L    CO2 27 21 - 32 mmol/L    Anion gap 5 5 - 15 mmol/L    Glucose 233 (H) 65 - 100 mg/dL    BUN 33 (H) 6 - 20 mg/dL    Creatinine 0.88 0.55 - 1.02 mg/dL    BUN/Creatinine ratio 38 (H) 12 - 20      GFR est AA >60 >60 ml/min/1.73m2    GFR est non-AA >60 >60 ml/min/1.73m2    Calcium 8.5 8.5 - 10.1 mg/dL    Bilirubin, total 0.2 0.2 - 1.0 mg/dL    AST (SGOT) 29 15 - 37 U/L    ALT (SGPT) 66 12 - 78 U/L    Alk.  phosphatase 92 45 - 117 U/L    Protein, total 6.5 6.4 - 8.2 g/dL    Albumin 1.8 (L) 3.5 - 5.0 g/dL    Globulin 4.7 (H) 2.0 - 4.0 g/dL    A-G Ratio 0.4 (L) 1.1 - 2.2     CBC WITH AUTOMATED DIFF    Collection Time: 04/22/22  6:42 AM   Result Value Ref Range    WBC 17.1 (H) 3.6 - 11.0 K/uL    RBC 3.33 (L) 3.80 - 5.20 M/uL    HGB 9.4 (L) 11.5 - 16.0 g/dL    HCT 29.7 (L) 35.0 - 47.0 %    MCV 89.2 80.0 - 99.0 FL    MCH 28.2 26.0 - 34.0 PG    MCHC 31.6 30.0 - 36.5 g/dL    RDW 16.6 (H) 11.5 - 14.5 %    PLATELET 021 029 - 594 K/uL    MPV 11.7 8.9 - 12.9 FL    NRBC 0.0 0.0  WBC    ABSOLUTE NRBC 0.00 0.00 - 0.01 K/uL    NEUTROPHILS 88 (H) 32 - 75 %    LYMPHOCYTES 9 (L) 12 - 49 %    MONOCYTES 2 (L) 5 - 13 %    EOSINOPHILS 0 0 - 7 %    BASOPHILS 0 0 - 1 %    IMMATURE GRANULOCYTES 1 (H) 0 - 0.5 %    ABS. NEUTROPHILS 15.2 (H) 1.8 - 8.0 K/UL    ABS. LYMPHOCYTES 1.5 0.8 - 3.5 K/UL    ABS. MONOCYTES 0.4 0.0 - 1.0 K/UL    ABS. EOSINOPHILS 0.0 0.0 - 0.4 K/UL    ABS. BASOPHILS 0.0 0.0 - 0.1 K/UL    ABS. IMM. GRANS. 0.1 (H) 0.00 - 0.04 K/UL    DF AUTOMATED     C REACTIVE PROTEIN, QT    Collection Time: 04/22/22  6:42 AM   Result Value Ref Range    C-Reactive protein 4.94 (H) 0.00 - 0.60 mg/dL   PROCALCITONIN    Collection Time: 04/22/22  6:42 AM   Result Value Ref Range    Procalcitonin 2.28 (H) 0 ng/mL   GLUCOSE, POC    Collection Time: 04/22/22 11:46 AM   Result Value Ref Range    Glucose (POC) 219 (H) 65 - 117 mg/dL    Performed by [address]:    HPI: Patient is a 69 y. o. year old female with signal past medical history of CVA with PEG tube left great toe gangrene, chronic kidney disease CVA with right-sided weakness hypertension type 2 diabetes bedridden open eyes do not follow any command was transferred to the hospital with fever and altered mental status as per SNF staff patient was awake and following simple command at the nursing home facility and since yesterday not able to follow any command confused transferred to the ER seen by the ER physician work-up shows acute kidney injury with hyperkalemia        Admitted for further evaluation and treat.     04/05   Patient is seen at bedside with daughter and nephew today.  Patient is in distress due to pain and daughter notes that patient gets Neurontin TID for neuropathy daily. Otherwise, patient is still receiving enteric feeding through PEG tube and getting IVF.      Patient was seen by nephrology yesterday. Recommends obtaining renal panel and continuing IVF. Patient was seen by ID yesterday. Recommends continuing IV vancomycin for urosepsis.      Patient was seen by pulmonology today. Recommends PRBC transfusion.      Labs indicate WBC 16.9, Hgb 6.3, Na 148, Cl 120, BUN 72, Cr 1.71, Ca 8, CRP 28.4, pro-carrie 6.93 and .         04/06   Patient is seen at bedside. Patient received 2 units of PRBC. Patient is on Zosyn. No new changes today.      Patient seen by the vascular surgeon he recommend great toe amputation  And for sacral wound recommend Medihoney ointment      Labs show increasing Hgb of 9, decreasing WBC 14.7, Na 147, , BUN 55,   Cr 1.47, and CRP 21.4.     CXR is unremarkable.      04/07  Patient is seen resting at bedside. Patient received one unit of PRBC with increased Hgb of 10.6. No acute changes today.     Patient is seen by nephrology who recommends free water flushes to correct hypernatremia.      Labs show increasing Hgb of 10.6, WBC 15.1, pro-calcitonin 3.78, and CRP 16. 1.     4/8     Patient had a rapid response this morning ABG and chest x-ray was ordered  Patient tachycardic tachypneic  ABG  pH 7.51 PCO2 31 PO2 65 bicarb 26 oxygen saturation 95% on room air     Chest x-ray shows no much changes     4/9     Patient resting the bed open eyes  On room air/breathing through the mouth     4/10     Patient open eyes not in distress breathing through her mouth  According to the nurse patient not sleep all day and all night yesterday        4/11  Patient is seen at bedside. She is awake but not in any distress. Patient is on zosyn. PEG tube in place.      Urine culture positive for Pseudomonas.     CXR shows no focal changes.       Labs remarkable for WBC  22.3, Hgb 10.5, Na 153, , Cr 1.55 BUN 94, pro-calcitonin 1.64, and CRP 3.33.     4/12  Patient is on Vancomycin, zosyn and D5W. PEG tube in place. Spoke to daughter at bedside. She has no new complaints. Labs remarkable for:  WBC  18.9,  Cr 1.33, BUN 84,  and CRP 2.33 which are decreasing. Hgb 11.2, Na-corrected 158, , and pro-calcitonin 1.93 which are increasing.      Seen by pulmonology. No new recommendations.     Seen by GI. No new recommendations.      Seen by nephrology. recommends IV D5W IVF to decrease hypernatremia.     Seen by ID. Recommends discontinuing fluconazole, follow up blood cultures, and continuing IV zosyn and vancomycin.         4/13/2022  Patient is on Vancomycin, zosyn and D5W. PEG tube in place. Nephrology - recommends IV D5W IVF to decrease hypernatremia. Increase glargine to 25 units twice daily to correct hyperglycemia. Once the D5W is discontinued, the dose of glargine will have to be decreased     ID - Continue IV Zosyn and Vancomycin; discontinue Vancomycin if WBC continues to decrease     Pulmonology - No new recommendations.     GI - No new recommendations.     CXR 4/11/2022 - No acute pulmonary process (no focal changes)      Labs remarkable for:  Blood Glucose 380  WBC  19.8  Hgb 10.3   Na-corrected 154  BUN 69  Cr 1.22  CRP 3.14  pro-calcitonin 2.28      4/14  Patient's family members are present at bedside with the patient. They notes that the patient was in distress this morning. Patient still on vancomycin and zosyn. PEG tube in place and patient is receiving enteral feedings.      Seen by vascular surgeon who recommends great toe amputation.      Labs remarkable for Na-corrected 154 improving, Cr 1.19 improving, pro-carrie 2.42,  and CRP 7.65.     4/15  Patient is seen grunting and restless at bedside. Daughter is at bedside.  She has no new complaints.      UA on 4/14 still positive for WBC>100, leukocyte esterase and grew yeast.  Labs remarkable for WBC 15.9 decreasing, Hb 9.5, Na-corrected 154, and CRP 7.99.     Patient seen by ID. Recommends continuing zosyn and discontinuing vancomycin.     4/18  Patient is seen at bedside. She is awake and alert x3. She is able to say a few words this morning. She has no new complaints. She is on zosyn and fluconazole. PEG tube is clogged.     Seen by vascular surgeon. Recommends great toe and second toe amputation. Awaiting for consent from daughter.      Labs remarkable for WBC 11.4 decreasing, Hb 9.8, Na 145, and pro-calcitonin 2.01.     4/19  Patient was talking this morning and has no new complaints today. Patient is still receiving fluconazole. PEG tube in place and working.      Labs remarkable for WBC 12.9, Hgb 9.1, and CRP 7.68. Seen by ID. Recommends transitioning to oral fluconazole 200 mg daily for 11 more days.        History of present illness patient 54 physical at the time of admission as a patient admitted with sepsis UTI acute on chronic kidney disease hyponatremia patient history of CVA history of gangrene of the left great toe sacral decubitus ulcers patient was seen by wound care also seen by the vascular surgeon recommend to continue monitor the gangrene of the foot no surgery at this time patient also seen by the wound care nurse regarding sacral decubitus ulcer no need of debridement at this time patient was treated for hypernatremia and acute kidney injury by the nephrologist which significant improvement    Seen by infectious disease treated with IV Zosyn now discharging back to nursing home on full p.o. fluconazole    Other GI as PEG tube came out not functioning well replaced yesterday patient tolerated the procedure well and tolerating the feeding today    Otherwise patient remained stable follow-up with vascular surgeon as an outpatient regarding gangrene of the foot    Change position every 2 hours at the nursing home for sacral decubitus ulcer keep the wounds clean      Medication reconciliation done time shopping 35 minutes 50% time spent counseling on coordination of    Discharged on fluconazole 200 mg daily for 9 days    Signed:   Carlee Seip, MD  4/22/2022  1:39 PM

## 2022-04-22 NOTE — WOUND CARE
Spoke with Dr. Isabel Lora via telephone on 4/21/22 and he informed he wants a wound vac set up for application to the sacrum outpatient. He does not want wound vac applied at this time while in patient. Per Dr. Aristeo Kam note this morning patient will also need wound vac for left foot wound as well. I spoke with Minerva WATSON and informed her that the receiving SNF will need to have a wound vac on site for NPWT for left foot and sacrum. Unless the facility uses a y-connector to use 1 wound vac machine for both wounds the facility will need 2 wound vacs (one for each  Wound). Will continue to follow.

## 2022-04-22 NOTE — OP NOTES
This is operative report on Monika Helton, patient's YOB: 1945. Date of surgery: April 20, 2022. Surgeon: Dr. Navdeep Nino. Preop diagnosis:  Left great toe and second toe gangrene. Postop diagnosis: Same as above. Procedure:  Left second and great toe amputation. Left distal middle foot excisional wound debridement including first and second metatarsal bone, skin and subcu fat tissue and fat. Excised wound 2 x 5 cm. Anesthesia: MAC with a local anesthetic. Anesthesiologist: Dr. Golden Calix  Assistant: None  EBL: Minimal  Complication: None  Specimen: As above. Implants none    Indication for surgery: Ms. Bryanna Patel is currently hospitalized with multiple medical problems. Patient has been gangrenous tissue on the left great toe and second toe extending to metatarsal level. Patient's family was discussed about surgical wound debridement and the palpitation. We talked about rational for the procedure, risks benefits and postop wound care details. Patient's daughter has signed surgical consent. Description of procedure: Patient was brought to the operating table comfortable supine position. Followed by monitored anesthesia care was initiated. Followed by patient's left foot was prepped using Betadine solution. Sterile drapes of procedure patient. At this time timeout was called after confirmation was made procedure commenced. Using #15 blade skin incision made at the base of the fourth and fifth toe. Full-thickness skin incision made and the both toes amputated at the proximal phalangeal joint. Followed by necrotic tissue was noted further proximal and skin incision was extended to the healthy tissue. Full-thickness skin incision was made through subcutaneous tissue underlying musculature and tendons and exposing first and second metatarsal bone. These bones were divided with a bone cutter. Using rongeur surrounding tissue was aggressively cleaned up.   Until healthy viable tissue was found. Hemostasis obtained using electrocautery. Followed by wounds are washed out and irrigated packed with iodine soaked 4 x 4 gauze and Kerlix rolls. Patient tolerated procedure well. Patient was awakened and transferred recovery area stable condition.

## 2022-04-22 NOTE — PROGRESS NOTES
Progress Note    Patient: Rossy Ramires MRN: [id number]  SSN: [id number]    YOB: 1945  Age: 78 y.o. Sex: female      Admit Date: 4/3/2022    LOS: 18 days     Subjective:   Patient followed for sepsis with UTI secondary to Pseudomonas sensitive to Zosyn, which was completed but remains on on Fluconazole for Candida UTI. In addition, she was started on Meropenem yesterday because of possible sacral wound infection, now status post excisional debridement. Diverting colostomy recommended but not felt to be practical at this time. Patient  Verbalized today she was having pain in her buttocks, presumably from the sacral wound. Objective:     Vitals:    04/21/22 2047 04/21/22 2355 04/22/22 0521 04/22/22 0712   BP: (!) 154/63 (!) 150/64 (!) 171/64 (!) 151/61   Pulse: 77 71 77 76   Resp: 17 17 16 16   Temp: 98.4 °F (36.9 °C) 98 °F (36.7 °C) 98.3 °F (36.8 °C) 98.6 °F (37 °C)   SpO2: 98% 97% 99% 98%   Weight:       Height:            Intake and Output:  Current Shift: No intake/output data recorded. Last three shifts: 04/20 1901 - 04/22 0700  In: 2170 [I.V.:250]  Out: [address] [Urine:1425]    Physical Exam:   Vitals and nursing note reviewed. Constitutional:       General: She is not in acute distress. Appearance: She is ill-appearing. HENT: eyes closed, Room Air   Cardiovascular:      Rate and Rhythm: Normal rate and regular rhythm. Heart sounds: No murmur heard. Pulmonary:      Effort: Pulmonary effort is normal.      Breath sounds: Normal breath sounds. Abdominal:      General: Bowel sounds are normal.      Palpations: Abdomen is soft. Tenderness: There is no abdominal tenderness. Comments: PEG tube in place, site unremarkable  Genitourinary:     Comments: Bell catheter with minimal urine sediment  Flexiseal with empty bag at this time  Musculoskeletal:      Right lower leg: No edema. Left lower leg: No edema.       Comments: Left foot with normal granulation tissue  Skin: Stage 3 sacral wound not visualized today     Findings: No rash. Neurological:      Mental Status: She is alert. Comments: Unable to assess   Psychiatric:      Comments: Unable to assess      Lab/Data Review:     WBC 17,100  UA with WBC >100, budding yeasts    Procal 2.28 < 2.91 <4.99 <5.34 <1.34 <1.20 <1.55 <2.01 <2.42 <6.93  CRP 4.94 <8.28 < 7.05 <7.68 <7.99 <7.79 <5.89 <6.55 3.44 <16.10 <21.40 <28.40    Blood cultures (4/3) No growth FINAL  Blood cultures (4/10)  No growth FINAL   Urine culture (4/7) >100,000 cfu/ml Pseudomonas aeruginosa   Urine culture (4/14) >100,000 cfu/ml mixed urogenital yasmine   Wound culture sacrum (4/20) No growth thus far  Tissue culture sacrum (4/20) No growth thus far     CXR (4/11) No acute pulmonary process    Assessment:     Active Problems:    Hyperkalemia (4/4/2022)      UTI (urinary tract infection) (4/4/2022)      Sepsis ([address] (4/4/2022)      RICARDO (acute kidney injury) ([facility] [address] (4/4/2022)      AMS (altered mental status) (4/4/2022)    1. Sepsis with fever and leukocytosis, elevated procal and CRP, resolving  2. UTI with marked pyuria and bacteriuria, secondary to Pseudomonas aeruginosa, status post 14 days IV Zosyn  3. Altered mentals status, multifactorial including sepsis  4. Uncontrolled diabetes mellitus with hyperglycemia  5. Chronic maxillary sinusitis  6. ASCVD with prior stroke  7. Hepatitis C antibody positive, HCV RNA negative  8. 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 Acute hypoxic respiratory failure, resolved  11. Diarrhea, presumed secondary to tube feeding  12. Candida UTI, presumptive, with marked pyuria and yeasts, Day #6 IV Fluconazole    Comment:  WBC still increasing but procal and CRP decreasing. Plan:   1. Continue Fluconazole, reasonable to transition to oral Fluconazole 200 mg daily for 8 more days  2. Continue IV Meropenem pending sacral cultures  3. Follow-up sacral wound cultures  4.  In am, repeat CBC, procal and CRP        Signed By: Chari Peabody, MD     April 22, 2022

## 2022-04-22 NOTE — DISCHARGE INSTRUCTIONS
Discharge Instructions       PATIENT ID: Rahul Arrieta  MRN: [id number]   YOB: 1945    DATE OF ADMISSION: 4/3/2022 10:36 PM    DATE OF DISCHARGE: 4/19/2022    PRIMARY CARE PROVIDER: Saira Oropeza MD     ATTENDING PHYSICIAN: Sina Skinner MD  DISCHARGING PROVIDER: Claudio Csah MD    To contact this individual call [phone] and ask the  to page. If unavailable ask to be transferred the Adult Hospitalist Department. DISCHARGE DIAGNOSES sepsis sacral ulcer PVD    CONSULTATIONS: IP CONSULT TO NEPHROLOGY  IP CONSULT TO INFECTIOUS DISEASES  IP CONSULT TO NEPHROLOGY  IP CONSULT TO NEPHROLOGY  IP CONSULT TO GASTROENTEROLOGY  IP CONSULT TO GASTROENTEROLOGY  IP CONSULT TO PULMONOLOGY  IP CONSULT TO GENERAL SURGERY    PROCEDURES/SURGERIES: Procedure(s):  ESOPHAGOGASTRODUODENOSCOPY (EGD)  PERCUTANEOUS ENDOSCOPIC GASTROSTOMY TUBE INSERTION    PENDING TEST RESULTS:   At the time of discharge the following test results are still pending: None    FOLLOW UP APPOINTMENTS:   Follow-up Information     Follow up With Specialties Details Why Bobby George MD Internal Medicine In 1 week  [address] [facility]  [phone]             ADDITIONAL CARE RECOMMENDATIONS: Follow-up with the vascular surgeon regarding gangrene of the right foot    DIET: PEG feeding at ***ml/hr      ACTIVITY: Activity as tolerated    Wound care: Wound Care Order: submitted to [facility] Please view [email or web address]    EQUIPMENT needed: None      DISCHARGE MEDICATIONS:   See Medication Reconciliation Form    · It is important that you take the medication exactly as they are prescribed. · Keep your medication in the bottles provided by the pharmacist and keep a list of the medication names, dosages, and times to be taken in your wallet. · Do not take other medications without consulting your doctor.        NOTIFY YOUR PHYSICIAN FOR ANY OF THE FOLLOWING: Fever over 101 degrees for 24 hours. Chest pain, shortness of breath, fever, chills, nausea, vomiting, diarrhea, change in mentation, falling, weakness, bleeding. Severe pain or pain not relieved by medications. Or, any other signs or symptoms that you may have questions about. DISPOSITION:    Home With:   OT  PT  HH  RN       SNF/Inpatient Rehab/LTAC    Independent/assisted living    Hospice    Other:         PROBLEM LIST Updated:  Yes ***       Signed:   Melanie Ramirez MD  4/19/2022  1:39 PM     Discharge Instructions       PATIENT ID: Deanne Delgado  MRN: [id number]   YOB: 1945    DATE OF ADMISSION: 4/3/2022 10:36 PM    DATE OF DISCHARGE: 4/19/2022    PRIMARY CARE PROVIDER: Camila Mcgowan MD     ATTENDING PHYSICIAN: Wojciech Scruggs MD  DISCHARGING PROVIDER: Melanie Ramirez MD    To contact this individual call [phone] and ask the  to page. If unavailable ask to be transferred the Adult Hospitalist Department.     DISCHARGE DIAGNOSES ***    CONSULTATIONS: IP CONSULT TO NEPHROLOGY  IP CONSULT TO INFECTIOUS DISEASES  IP CONSULT TO NEPHROLOGY  IP CONSULT TO NEPHROLOGY  IP CONSULT TO GASTROENTEROLOGY  IP CONSULT TO GASTROENTEROLOGY  IP CONSULT TO PULMONOLOGY  IP CONSULT TO GENERAL SURGERY    PROCEDURES/SURGERIES: Procedure(s):  ESOPHAGOGASTRODUODENOSCOPY (EGD)  PERCUTANEOUS ENDOSCOPIC GASTROSTOMY TUBE INSERTION    PENDING TEST RESULTS:   At the time of discharge the following test results are still pending: ***    FOLLOW UP APPOINTMENTS:   Follow-up Information     Follow up With Specialties Details Why Contact Info    Ceferino Marx MD Internal Medicine In 1 week  [address]  [phone]             ADDITIONAL CARE RECOMMENDATIONS: ***    DIET: {diet:16128}      ACTIVITY: {discharge activity:43929}    Wound care: {KPDischarge Wound Care Instructions:35961}    EQUIPMENT needed: ***      DISCHARGE MEDICATIONS:   See Medication Reconciliation Form    · It is important that you take the medication exactly as they are prescribed. · Keep your medication in the bottles provided by the pharmacist and keep a list of the medication names, dosages, and times to be taken in your wallet. · Do not take other medications without consulting your doctor. NOTIFY YOUR PHYSICIAN FOR ANY OF THE FOLLOWING:   Fever over 101 degrees for 24 hours. Chest pain, shortness of breath, fever, chills, nausea, vomiting, diarrhea, change in mentation, falling, weakness, bleeding. Severe pain or pain not relieved by medications. Or, any other signs or symptoms that you may have questions about.       DISPOSITION:    Home With:   OT  PT  HH  RN       SNF/Inpatient Rehab/LTAC    Independent/assisted living    Hospice    Other:         PROBLEM LIST Updated:  Yes ***       Signed:   Dayna Baez MD  4/19/2022  1:39 PM

## 2022-04-22 NOTE — PROGRESS NOTES
Rounding on patient and patient had pulled out IV. Patient pulling at telemetry and peg tube. Soft mitts placed on patient to keep her from pulling off telemetry and from pulling out peg tube.

## 2022-04-23 NOTE — WOUND CARE
Met with family members Giselle Graff and another daughter to answer questions pertaining to wound care for patient. Giselle Graff is requesting that the wound vac be applied now during admission to sacrum and left foot. I explained that Dr. Basilio Castelan wants a wet/dry dressing to both areas BID, however Giselle Graff is insistent on the wound vac being applied during admission. I informed Giselle Graff that I would attempt to contact Dr. Basilio Castelan as I cannot apply wound vac without an order. I have left a message for Dr. Basilio Castelan and will continue to follow. All other questions were answered. Dr. Basilio Castelan returned call and confirmed wet/dry dressing BID for left foot and sacrum. He will be here in the morning to speak with the family. I informed Giselle Graff and other sister that Dr. Basilio Castelan will be in in the morning. I also explained rationale behind wet/dry dressing BID for affected areas to help clean/mechanical debride remaining devitalized tissue. They are concerned about adequate blood flow to left foot and potential for healing. Dr. Cristobal Champ updated on conversation.

## 2022-04-23 NOTE — DISCHARGE SUMMARY
Discharge Summary       PATIENT ID: Emelia Barker  MRN: [id number]   YOB: 1945    DATE OF ADMISSION: 4/3/2022 10:36 PM    DATE OF DISCHARGE:   PRIMARY CARE PROVIDER: Adwoa Gtuierrez MD     ATTENDING PHYSICIAN: Foreign Soto  DISCHARGING PROVIDER: Foreign Soto      CONSULTATIONS: IP CONSULT TO NEPHROLOGY  IP CONSULT TO INFECTIOUS DISEASES  IP CONSULT TO NEPHROLOGY  IP CONSULT TO NEPHROLOGY  IP CONSULT TO GASTROENTEROLOGY  IP CONSULT TO GASTROENTEROLOGY  IP CONSULT TO PULMONOLOGY  IP CONSULT TO GENERAL SURGERY  IP CONSULT TO GENERAL SURGERY    PROCEDURES/SURGERIES: Procedure(s):  SACRAL DEBRIDEMENT    ADMITTING DIAGNOSES:    Patient Active Problem List    Diagnosis Date Noted    Hyperkalemia 04/04/2022    UTI (urinary tract infection) 04/04/2022    Sepsis ([address] 04/04/2022    RICARDO (acute kidney injury) ([address] 04/04/2022    AMS (altered mental status) 04/04/2022    CVA (cerebral vascular accident) ([address] 02/21/2022    Elevated troponin 02/21/2022    Ischemia of both lower extremities 07/22/2020    Pain in both lower legs 07/22/2020    Acute osteomyelitis of ankle or foot ([address] 04/04/2019    Diabetic peripheral neuropathy ([address] 04/04/2019    Spinal stenosis in cervical region 04/04/2019    Type 2 diabetes mellitus with nephropathy ([address] 01/31/2018    DM type 2 causing vascular disease ([address] 07/27/2017    Hypercalcemia 12/12/2016    PAD (peripheral artery disease) ([address] 04/11/2016    Infection of nailbed of toe of left foot 09/10/2015    Diabetes mellitus with neurological manifestations, uncontrolled 08/08/2013    HTN (hypertension)     Hyperlipidemia     Neuropathy     Sciatica        DISCHARGE DIAGNOSES / PLAN:      Severe sepsis with UTI  UTI/Pseudomonas  Acute on chronic kidney disease  Hyperkalemia  Hypernatremia, improving  CVA with right-sided weakness  Anemia of chronic disease  Gangrene of the left great toe  Sacral decubitus ulcer  Acute respiratory failure  Possible aspiration  Elevated BNP  PEG tube malfunction/seen by the GI now working   Gangrene of the left great toe-s/p removal of left great and second toe  Static post debridement of the sacral wound        DISCHARGE MEDICATIONS:  Current Discharge Medication List      START taking these medications    Details   albuterol-ipratropium (DUO-NEB) 2.5 mg-0.5 mg/3 ml nebu 3 mL by Nebulization route every six (6) hours as needed for Wheezing or Shortness of Breath. Qty: 30 Nebule, Refills: 1  Start date: 4/19/2022      hydrALAZINE (APRESOLINE) 50 mg tablet Take 1 Tablet by mouth three (3) times daily. Qty: 60 Tablet, Refills: 0  Start date: 4/19/2022      metoprolol tartrate (LOPRESSOR) 50 mg tablet Take 1 Tablet by mouth two (2) times a day. Qty: 60 Tablet, Refills: 0  Start date: 4/19/2022      fluconazole (DIFLUCAN) 100 mg tablet Take 2 Tablets by mouth daily for 7 days. Fluconazole 200 mg daily for 11 days  Qty: 11 Tablet, Refills: 0  Start date: 4/19/2022, End date: 4/26/2022         CONTINUE these medications which have NOT CHANGED    Details   gabapentin (NEURONTIN) 300 mg capsule Take 1 Capsule by mouth two (2) times a day. Max Daily Amount: 600 mg. Qty: 12 Capsule, Refills: 0    Associated Diagnoses: Pain      insulin glargine (LANTUS) 100 unit/mL injection 50 Units by SubCUTAneous route daily. Qty: 10 mL, Refills: 0      acidophilus-pectin, citrus 25 million cell -100 mg tab tablet Take 2 Tablets by mouth daily. Qty: 30 Tablet, Refills: 0      amLODIPine (NORVASC) 5 mg tablet Take 1 Tablet by mouth daily. Qty: 30 Tablet, Refills: 0      artificial saliva (MOUTH KOTE) spra Take 1 Spray by mouth three (3) times daily. Qty: 10 mL, Refills: 0      latanoprost (XALATAN) 0.005 % ophthalmic solution Administer 1 Drop to right eye every evening.   Qty: 10 mL, Refills: 0      atorvastatin (LIPITOR) 20 mg tablet TAKE 1 TABLET BY MOUTH EVERY NIGHT  Qty: 30 Tablet, Refills: 5    Associated Diagnoses: Type II or unspecified type diabetes mellitus with neurological manifestations, uncontrolled(250.62) ([facility] Essential hypertension; Hyperlipidemia, unspecified hyperlipidemia type; Vitamin D deficiency; Vitamin B12 deficiency; Neuropathy; Type 2 diabetes mellitus with hyperglycemia, with long-term current use of insulin ([facility] Diabetes mellitus with neurological manifestations, uncontrolled; Mixed hyperlipidemia; PAD (peripheral artery disease) ([facility] Hypercalcemia; Uncontrolled type 2 diabetes mellitus with hyperglycemia, with long-term current use of insulin (HCC)      brimonidine (ALPHAGAN) 0.2 % ophthalmic solution Administer 1 Drop to both eyes three (3) times daily. aspirin delayed-release 81 mg tablet Take  by mouth daily. ferrous sulfate 325 mg (65 mg iron) tablet Take 1 Tab by mouth two (2) times a day. Qty: 60 Tab, Refills: 5    Associated Diagnoses: Diabetes mellitus with neurological manifestations, uncontrolled; Essential hypertension; PAD (peripheral artery disease) ([facility] Mixed hyperlipidemia; Type 2 diabetes mellitus with hyperglycemia, with long-term current use of insulin ([facility] Hypercalcemia; Uncontrolled type 2 diabetes mellitus with hyperglycemia, with long-term current use of insulin ([facility] Type II or unspecified type diabetes mellitus with neurological manifestations, uncontrolled(250.62) ([address] Hyperlipidemia, unspecified hyperlipidemia type; Vitamin D deficiency; Vitamin B12 deficiency; Neuropathy         STOP taking these medications       lidocaine (XYLOCAINE) 4 % (40 mg/mL) topical solution Comments:   Reason for Stopping:         metoprolol succinate (TOPROL-XL) 50 mg XL tablet Comments:   Reason for Stopping:                 NOTIFY YOUR PHYSICIAN FOR ANY OF THE FOLLOWING:   Fever over 101 degrees for 24 hours. Chest pain, shortness of breath, fever, chills, nausea, vomiting, diarrhea, change in mentation, falling, weakness, bleeding.  Severe pain or pain not relieved by medications. Or, any other signs or symptoms that you may have questions about. DISPOSITION:  x  Home With:   OT  PT  HH  RN       Long term SNF/Inpatient Rehab    Independent/assisted living    Hospice    Other:       PATIENT CONDITION AT DISCHARGE: Stable      PHYSICAL EXAMINATION AT DISCHARGE:  General:          Alert, cooperative, no distress, appears stated age. HEENT:           Atraumatic, anicteric sclerae, pink conjunctivae                          No oral ulcers, mucosa moist, throat clear, dentition fair  Neck:               Supple, symmetrical  Lungs:             Clear to auscultation bilaterally. No Wheezing or Rhonchi. No rales. Chest wall:      No tenderness  No Accessory muscle use. Heart:              Regular  rhythm,  No  murmur   No edema  Abdomen:        Soft, non-tender. Not distended. Bowel sounds normal  Extremities:     No cyanosis. No clubbing,                            Skin turgor normal, Capillary refill normal  Skin:                Not pale. Not Jaundiced  No rashes   Psych:             Not anxious or agitated. Neurologic:      Alert, moves all extremities, answers questions appropriately and responds to commands     XR ABD (KUB)   Final Result   Percutaneous gastrostomy tube tip in the gastric antrum. XR CHEST PORT   Final Result   No acute pulmonary process. XR CHEST PORT   Final Result   No significant change. XR CHEST PORT   Final Result      XR CHEST PORT   Final Result   No acute cardiopulmonary abnormality. .       XR CHEST PORT   Final Result      CT HEAD WO CONT   Final Result   Chronic changes which appear stable. No acute or active intracranial process. Chronic paranasal sinus disease         XR CHEST PORT   Final Result   No acute findings.       XR SWALLOW FUNC VIDEO    (Results Pending)        Recent Results (from the past 24 hour(s))   GLUCOSE, POC    Collection Time: 04/22/22  7:15 PM   Result Value Ref Range    Glucose (POC) 217 (H) 65 - 117 mg/dL    Performed by [facility]    GLUCOSE, POC    Collection Time: 04/22/22 11:06 PM   Result Value Ref Range    Glucose (POC) 169 (H) 65 - 117 mg/dL    Performed by [address] [place] [address], POC    Collection Time: 04/23/22 12:16 AM   Result Value Ref Range    Glucose (POC) 195 (H) 65 - 117 mg/dL    Performed by [facility] [place]    GLUCOSE, POC    Collection Time: 04/23/22  5:12 AM   Result Value Ref Range    Glucose (POC) 148 (H) 65 - 117 mg/dL    Performed by [facility], QT    Collection Time: 04/23/22  6:35 AM   Result Value Ref Range    C-Reactive protein 2.79 (H) 0.00 - 0.60 mg/dL   PROCALCITONIN    Collection Time: 04/23/22  6:35 AM   Result Value Ref Range    Procalcitonin 1.47 (H) 0 ng/mL   METABOLIC PANEL, COMPREHENSIVE    Collection Time: 04/23/22  6:35 AM   Result Value Ref Range    Sodium 144 136 - 145 mmol/L    Potassium 4.6 3.5 - 5.1 mmol/L    Chloride 110 (H) 97 - 108 mmol/L    CO2 30 21 - 32 mmol/L    Anion gap 4 (L) 5 - 15 mmol/L    Glucose 142 (H) 65 - 100 mg/dL    BUN 36 (H) 6 - 20 mg/dL    Creatinine 0.74 0.55 - 1.02 mg/dL    BUN/Creatinine ratio 49 (H) 12 - 20      GFR est AA >60 >60 ml/min/1.73m2    GFR est non-AA >60 >60 ml/min/1.73m2    Calcium 8.2 (L) 8.5 - 10.1 mg/dL    Bilirubin, total 0.2 0.2 - 1.0 mg/dL    AST (SGOT) 22 15 - 37 U/L    ALT (SGPT) 55 12 - 78 U/L    Alk.  phosphatase 85 45 - 117 U/L    Protein, total 5.8 (L) 6.4 - 8.2 g/dL    Albumin 1.7 (L) 3.5 - 5.0 g/dL    Globulin 4.1 (H) 2.0 - 4.0 g/dL    A-G Ratio 0.4 (L) 1.1 - 2.2     CBC WITH AUTOMATED DIFF    Collection Time: 04/23/22  7:20 AM   Result Value Ref Range    WBC 14.6 (H) 3.6 - 11.0 K/uL    RBC 2.99 (L) 3.80 - 5.20 M/uL    HGB 8.5 (L) 11.5 - 16.0 g/dL    HCT 27.2 (L) 35.0 - 47.0 %    MCV 91.0 80.0 - 99.0 FL    MCH 28.4 26.0 - 34.0 PG    MCHC 31.3 30.0 - 36.5 g/dL    RDW 17.1 (H) 11.5 - 14.5 %    PLATELET 982 527 - 563 K/uL    MPV 11.4 8.9 - 12.9 FL    NRBC 0.0 0.0  WBC ABSOLUTE NRBC 0.00 0.00 - 0.01 K/uL    NEUTROPHILS 77 (H) 32 - 75 %    LYMPHOCYTES 16 12 - 49 %    MONOCYTES 5 5 - 13 %    EOSINOPHILS 1 0 - 7 %    BASOPHILS 0 0 - 1 %    IMMATURE GRANULOCYTES 1 (H) 0 - 0.5 %    ABS. NEUTROPHILS 11.4 (H) 1.8 - 8.0 K/UL    ABS. LYMPHOCYTES 2.3 0.8 - 3.5 K/UL    ABS. MONOCYTES 0.7 0.0 - 1.0 K/UL    ABS. EOSINOPHILS 0.1 0.0 - 0.4 K/UL    ABS. BASOPHILS 0.0 0.0 - 0.1 K/UL    ABS. IMM. GRANS. 0.1 (H) 0.00 - 0.04 K/UL    DF AUTOMATED     GLUCOSE, POC    Collection Time: 04/23/22 12:21 PM   Result Value Ref Range    Glucose (POC) 128 (H) 65 - 117 mg/dL    Performed by [facility]:    HPI: Patient is a 69 y. o. year old female with signal past medical history of CVA with PEG tube left great toe gangrene, chronic kidney disease CVA with right-sided weakness hypertension type 2 diabetes bedridden open eyes do not follow any command was transferred to the hospital with fever and altered mental status as per SNF staff patient was awake and following simple command at the nursing home facility and since yesterday not able to follow any command confused transferred to the ER seen by the ER physician work-up shows acute kidney injury with hyperkalemia        Admitted for further evaluation and treat.     04/05   Patient is seen at bedside with daughter and nephew today. Patient is in distress due to pain and daughter notes that patient gets Neurontin TID for neuropathy daily. Otherwise, patient is still receiving enteric feeding through PEG tube and getting IVF.      Patient was seen by nephrology yesterday. Recommends obtaining renal panel and continuing IVF. Patient was seen by ID yesterday. Recommends continuing IV vancomycin for urosepsis.      Patient was seen by pulmonology today. Recommends PRBC transfusion.      Labs indicate WBC 16.9, Hgb 6.3, Na 148, Cl 120, BUN 72, Cr 1.71, Ca 8, CRP 28.4, pro-carrie 6.93 and .         04/06   Patient is seen at bedside. Patient received 2 units of PRBC. Patient is on Zosyn. No new changes today.      Patient seen by the vascular surgeon he recommend great toe amputation  And for sacral wound recommend Medihoney ointment      Labs show increasing Hgb of 9, decreasing WBC 14.7, Na 147, , BUN 55,   Cr 1.47, and CRP 21.4.     CXR is unremarkable.      04/07  Patient is seen resting at bedside. Patient received one unit of PRBC with increased Hgb of 10.6. No acute changes today.     Patient is seen by nephrology who recommends free water flushes to correct hypernatremia.      Labs show increasing Hgb of 10.6, WBC 15.1, pro-calcitonin 3.78, and CRP 16. 1.     4/8     Patient had a rapid response this morning ABG and chest x-ray was ordered  Patient tachycardic tachypneic  ABG  pH 7.51 PCO2 31 PO2 65 bicarb 26 oxygen saturation 95% on room air     Chest x-ray shows no much changes     4/9     Patient resting the bed open eyes  On room air/breathing through the mouth     4/10     Patient open eyes not in distress breathing through her mouth  According to the nurse patient not sleep all day and all night yesterday        4/11  Patient is seen at bedside. She is awake but not in any distress. Patient is on zosyn. PEG tube in place.      Urine culture positive for Pseudomonas.     CXR shows no focal changes. Labs remarkable for WBC  22.3, Hgb 10.5, Na 153, , Cr 1.55 BUN 94, pro-calcitonin 1.64, and CRP 3.33.     4/12  Patient is on Vancomycin, zosyn and D5W. PEG tube in place. Spoke to daughter at bedside. She has no new complaints. Labs remarkable for:  WBC  18.9,  Cr 1.33, BUN 84,  and CRP 2.33 which are decreasing. Hgb 11.2, Na-corrected 158, , and pro-calcitonin 1.93 which are increasing.      Seen by pulmonology. No new recommendations.     Seen by GI. No new recommendations.      Seen by nephrology. recommends IV D5W IVF to decrease hypernatremia.     Seen by ID.  Recommends discontinuing fluconazole, follow up blood cultures, and continuing IV zosyn and vancomycin.         4/13/2022  Patient is on Vancomycin, zosyn and D5W. PEG tube in place. Nephrology - recommends IV D5W IVF to decrease hypernatremia. Increase glargine to 25 units twice daily to correct hyperglycemia. Once the D5W is discontinued, the dose of glargine will have to be decreased     ID - Continue IV Zosyn and Vancomycin; discontinue Vancomycin if WBC continues to decrease     Pulmonology - No new recommendations.     GI - No new recommendations.     CXR 4/11/2022 - No acute pulmonary process (no focal changes)      Labs remarkable for:  Blood Glucose 380  WBC  19.8  Hgb 10.3   Na-corrected 154  BUN 69  Cr 1.22  CRP 3.14  pro-calcitonin 2.28      4/14  Patient's family members are present at bedside with the patient. They notes that the patient was in distress this morning. Patient still on vancomycin and zosyn. PEG tube in place and patient is receiving enteral feedings.      Seen by vascular surgeon who recommends great toe amputation.      Labs remarkable for Na-corrected 154 improving, Cr 1.19 improving, pro-carrie 2.42,  and CRP 7.65.     4/15  Patient is seen grunting and restless at bedside. Daughter is at bedside. She has no new complaints.      UA on 4/14 still positive for WBC>100, leukocyte esterase and grew yeast.  Labs remarkable for WBC 15.9 decreasing, Hb 9.5, Na-corrected 154, and CRP 7.99.     Patient seen by ID. Recommends continuing zosyn and discontinuing vancomycin.     4/18  Patient is seen at bedside. She is awake and alert x3. She is able to say a few words this morning. She has no new complaints. She is on zosyn and fluconazole. PEG tube is clogged.     Seen by vascular surgeon. Recommends great toe and second toe amputation. Awaiting for consent from daughter.      Labs remarkable for WBC 11.4 decreasing, Hb 9.8, Na 145, and pro-calcitonin 2.01.     4/19  Patient was talking this morning and has no new complaints today. Patient is still receiving fluconazole. PEG tube in place and working.      Labs remarkable for WBC 12.9, Hgb 9.1, and CRP 7.68. Seen by ID. Recommends transitioning to oral fluconazole 200 mg daily for 11 more days.        History of present illness patient 54 physical at the time of admission as a patient admitted with sepsis UTI acute on chronic kidney disease hyponatremia patient history of CVA history of gangrene of the left great toe sacral decubitus ulcers patient was seen by wound care also seen by the vascular surgeon recommend to continue monitor the gangrene of the foot no surgery at this time patient also seen by the wound care nurse regarding sacral decubitus ulcer no need of debridement at this time patient was treated for hypernatremia and acute kidney injury by the nephrologist which significant improvement    Seen by infectious disease treated with IV Zosyn now discharging back to nursing home on full p.o. fluconazole    Other GI as PEG tube came out not functioning well replaced yesterday patient tolerated the procedure well and tolerating the feeding today    Otherwise patient remained stable follow-up with vascular surgeon as an outpatient regarding gangrene of the foot    Change position every 2 hours at the nursing home for sacral decubitus ulcer keep the wounds clean      Medication reconciliation done time shopping 35 minutes 50% time spent counseling on coordination of    Discharged on fluconazole 200 mg daily for 9 days    Discussed with the patient daughter in detail she is upset about the wound care/wound vacuum not placed    Vascular surgeon recommend wound vacuum placed at the time of discharge    Patient daughter want to talk to vascular surgeon tomorrow regarding wound vacuum      Pending discharge to skilled care rehab  Waiting for placement    Signed:   Vinita Terrell MD  4/23/2022  1:39 PM

## 2022-04-24 NOTE — PROGRESS NOTES
Clinical chart reviewed, discharge order noted. Patient is awaiting acceptance at [facility]. CM team will continue to follow.

## 2022-04-24 NOTE — DISCHARGE SUMMARY
Discharge Summary       PATIENT ID: Kati Burr  MRN: [id number]   YOB: 1945    DATE OF ADMISSION: 4/3/2022 10:36 PM    DATE OF DISCHARGE:   PRIMARY CARE PROVIDER: Magdalena Isbell MD     ATTENDING PHYSICIAN: Fany Soto  DISCHARGING PROVIDER: Fany Soto      CONSULTATIONS: IP CONSULT TO NEPHROLOGY  IP CONSULT TO INFECTIOUS DISEASES  IP CONSULT TO NEPHROLOGY  IP CONSULT TO NEPHROLOGY  IP CONSULT TO GASTROENTEROLOGY  IP CONSULT TO GASTROENTEROLOGY  IP CONSULT TO PULMONOLOGY  IP CONSULT TO GENERAL SURGERY  IP CONSULT TO GENERAL SURGERY    PROCEDURES/SURGERIES: Procedure(s):  SACRAL DEBRIDEMENT    ADMITTING DIAGNOSES:    Patient Active Problem List    Diagnosis Date Noted    Hyperkalemia 04/04/2022    UTI (urinary tract infection) 04/04/2022    Sepsis ([address] 04/04/2022    RICARDO (acute kidney injury) ([address] 04/04/2022    AMS (altered mental status) 04/04/2022    CVA (cerebral vascular accident) ([address] 02/21/2022    Elevated troponin 02/21/2022    Ischemia of both lower extremities 07/22/2020    Pain in both lower legs 07/22/2020    Acute osteomyelitis of ankle or foot ([address] 04/04/2019    Diabetic peripheral neuropathy ([address] 04/04/2019    Spinal stenosis in cervical region 04/04/2019    Type 2 diabetes mellitus with nephropathy ([address] 01/31/2018    DM type 2 causing vascular disease ([address] 07/27/2017    Hypercalcemia 12/12/2016    PAD (peripheral artery disease) ([address] 04/11/2016    Infection of nailbed of toe of left foot 09/10/2015    Diabetes mellitus with neurological manifestations, uncontrolled 08/08/2013    HTN (hypertension)     Hyperlipidemia     Neuropathy     Sciatica        DISCHARGE DIAGNOSES / PLAN:      Severe sepsis with UTI  UTI/Pseudomonas  Acute on chronic kidney disease  Hyperkalemia  Hypernatremia, improving  CVA with right-sided weakness  Anemia of chronic disease  Gangrene of the left great toe  Sacral decubitus ulcer  Acute respiratory failure  Possible aspiration  Elevated BNP  PEG tube malfunction/seen by the GI now working   Gangrene of the left great toe-s/p removal of left great and second toe  Static post debridement of the sacral wound        DISCHARGE MEDICATIONS:  Current Discharge Medication List      START taking these medications    Details   albuterol-ipratropium (DUO-NEB) 2.5 mg-0.5 mg/3 ml nebu 3 mL by Nebulization route every six (6) hours as needed for Wheezing or Shortness of Breath. Qty: 30 Nebule, Refills: 1  Start date: 4/19/2022      hydrALAZINE (APRESOLINE) 50 mg tablet Take 1 Tablet by mouth three (3) times daily. Qty: 60 Tablet, Refills: 0  Start date: 4/19/2022      metoprolol tartrate (LOPRESSOR) 50 mg tablet Take 1 Tablet by mouth two (2) times a day. Qty: 60 Tablet, Refills: 0  Start date: 4/19/2022      fluconazole (DIFLUCAN) 100 mg tablet Take 2 Tablets by mouth daily for 7 days. Fluconazole 200 mg daily for 11 days  Qty: 11 Tablet, Refills: 0  Start date: 4/19/2022, End date: 4/26/2022         CONTINUE these medications which have NOT CHANGED    Details   gabapentin (NEURONTIN) 300 mg capsule Take 1 Capsule by mouth two (2) times a day. Max Daily Amount: 600 mg. Qty: 12 Capsule, Refills: 0    Associated Diagnoses: Pain      insulin glargine (LANTUS) 100 unit/mL injection 50 Units by SubCUTAneous route daily. Qty: 10 mL, Refills: 0      acidophilus-pectin, citrus 25 million cell -100 mg tab tablet Take 2 Tablets by mouth daily. Qty: 30 Tablet, Refills: 0      amLODIPine (NORVASC) 5 mg tablet Take 1 Tablet by mouth daily. Qty: 30 Tablet, Refills: 0      artificial saliva (MOUTH KOTE) spra Take 1 Spray by mouth three (3) times daily. Qty: 10 mL, Refills: 0      latanoprost (XALATAN) 0.005 % ophthalmic solution Administer 1 Drop to right eye every evening.   Qty: 10 mL, Refills: 0      atorvastatin (LIPITOR) 20 mg tablet TAKE 1 TABLET BY MOUTH EVERY NIGHT  Qty: 30 Tablet, Refills: 5    Associated Diagnoses: Type II or unspecified type diabetes mellitus with neurological manifestations, uncontrolled(250.62) ([facility] Essential hypertension; Hyperlipidemia, unspecified hyperlipidemia type; Vitamin D deficiency; Vitamin B12 deficiency; Neuropathy; Type 2 diabetes mellitus with hyperglycemia, with long-term current use of insulin ([facility] Diabetes mellitus with neurological manifestations, uncontrolled; Mixed hyperlipidemia; PAD (peripheral artery disease) ([facility] [facility] Hypercalcemia; Uncontrolled type 2 diabetes mellitus with hyperglycemia, with long-term current use of insulin ([facility])      brimonidine (ALPHAGAN) 0.2 % ophthalmic solution Administer 1 Drop to both eyes three (3) times daily. aspirin delayed-release 81 mg tablet Take  by mouth daily. ferrous sulfate 325 mg (65 mg iron) tablet Take 1 Tab by mouth two (2) times a day. Qty: 60 Tab, Refills: 5    Associated Diagnoses: Diabetes mellitus with neurological manifestations, uncontrolled; Essential hypertension; PAD (peripheral artery disease) ([facility] Mixed hyperlipidemia; Type 2 diabetes mellitus with hyperglycemia, with long-term current use of insulin ([facility] [facility] Hypercalcemia; Uncontrolled type 2 diabetes mellitus with hyperglycemia, with long-term current use of insulin [facility] Type II or unspecified type diabetes mellitus with neurological manifestations, uncontrolled(250.62) ([address] Hyperlipidemia, unspecified hyperlipidemia type; Vitamin D deficiency; Vitamin B12 deficiency; Neuropathy         STOP taking these medications       lidocaine (XYLOCAINE) 4 % (40 mg/mL) topical solution Comments:   Reason for Stopping:         metoprolol succinate (TOPROL-XL) 50 mg XL tablet Comments:   Reason for Stopping:                 NOTIFY YOUR PHYSICIAN FOR ANY OF THE FOLLOWING:   Fever over 101 degrees for 24 hours. Chest pain, shortness of breath, fever, chills, nausea, vomiting, diarrhea, change in mentation, falling, weakness, bleeding.  Severe pain or pain not relieved by medications. Or, any other signs or symptoms that you may have questions about. DISPOSITION:  x  Home With:   OT  PT  HH  RN       Long term SNF/Inpatient Rehab    Independent/assisted living    Hospice    Other:       PATIENT CONDITION AT DISCHARGE: Stable      PHYSICAL EXAMINATION AT DISCHARGE:  General:          Alert, cooperative, no distress, appears stated age. HEENT:           Atraumatic, anicteric sclerae, pink conjunctivae                          No oral ulcers, mucosa moist, throat clear, dentition fair  Neck:               Supple, symmetrical  Lungs:             Clear to auscultation bilaterally. No Wheezing or Rhonchi. No rales. Chest wall:      No tenderness  No Accessory muscle use. Heart:              Regular  rhythm,  No  murmur   No edema  Abdomen:        Soft, non-tender. Not distended. Bowel sounds normal  Extremities:     No cyanosis. No clubbing,                            Skin turgor normal, Capillary refill normal  Skin:                Not pale. Not Jaundiced  No rashes   Psych:             Not anxious or agitated. Neurologic:      Alert, moves all extremities, answers questions appropriately and responds to commands     XR ABD (KUB)   Final Result   Percutaneous gastrostomy tube tip in the gastric antrum. XR CHEST PORT   Final Result   No acute pulmonary process. XR CHEST PORT   Final Result   No significant change. XR CHEST PORT   Final Result      XR CHEST PORT   Final Result   No acute cardiopulmonary abnormality. .       XR CHEST PORT   Final Result      CT HEAD WO CONT   Final Result   Chronic changes which appear stable. No acute or active intracranial process. Chronic paranasal sinus disease         XR CHEST PORT   Final Result   No acute findings.       XR SWALLOW FUNC VIDEO    (Results Pending)        Recent Results (from the past 24 hour(s))   GLUCOSE, POC    Collection Time: 04/23/22 12:21 PM   Result Value Ref Range    Glucose (POC) 128 (H) 65 - 117 mg/dL    Performed by Erasmo Poe, POC    Collection Time: 04/23/22  5:02 PM   Result Value Ref Range    Glucose (POC) 134 (H) 65 - 117 mg/dL    Performed by Karla MILLER, POC    Collection Time: 04/23/22  7:58 PM   Result Value Ref Range    Glucose (POC) 150 (H) 65 - 117 mg/dL    Performed by [place], POC    Collection Time: 04/23/22 11:39 PM   Result Value Ref Range    Glucose (POC) 118 (H) 65 - 117 mg/dL    Performed by [place], POC    Collection Time: 04/24/22  5:08 AM   Result Value Ref Range    Glucose (POC) 144 (H) 65 - 117 mg/dL    Performed by [place], POC    Collection Time: 04/24/22 10:56 AM   Result Value Ref Range    Glucose (POC) 171 (H) 65 - 117 mg/dL    Performed by [address] [place] [address]:    HPI: Patient is a 69 y. o. year old female with signal past medical history of CVA with PEG tube left great toe gangrene, chronic kidney disease CVA with right-sided weakness hypertension type 2 diabetes bedridden open eyes do not follow any command was transferred to the hospital with fever and altered mental status as per SNF staff patient was awake and following simple command at the nursing home facility and since yesterday not able to follow any command confused transferred to the ER seen by the ER physician work-up shows acute kidney injury with hyperkalemia        Admitted for further evaluation and treat.     04/05   Patient is seen at bedside with daughter and nephew today. Patient is in distress due to pain and daughter notes that patient gets Neurontin TID for neuropathy daily. Otherwise, patient is still receiving enteric feeding through PEG tube and getting IVF.      Patient was seen by nephrology yesterday. Recommends obtaining renal panel and continuing IVF. Patient was seen by ID yesterday. Recommends continuing IV vancomycin for urosepsis.      Patient was seen by pulmonology today. Recommends PRBC transfusion.      Labs indicate WBC 16.9, Hgb 6.3, Na 148, Cl 120, BUN 72, Cr 1.71, Ca 8, CRP 28.4, pro-carrie 6.93 and .         04/06   Patient is seen at bedside. Patient received 2 units of PRBC. Patient is on Zosyn. No new changes today.      Patient seen by the vascular surgeon he recommend great toe amputation  And for sacral wound recommend Medihoney ointment      Labs show increasing Hgb of 9, decreasing WBC 14.7, Na 147, , BUN 55,   Cr 1.47, and CRP 21.4.     CXR is unremarkable.      04/07  Patient is seen resting at bedside. Patient received one unit of PRBC with increased Hgb of 10.6. No acute changes today.     Patient is seen by nephrology who recommends free water flushes to correct hypernatremia.      Labs show increasing Hgb of 10.6, WBC 15.1, pro-calcitonin 3.78, and CRP 16. 1.     4/8     Patient had a rapid response this morning ABG and chest x-ray was ordered  Patient tachycardic tachypneic  ABG  pH 7.51 PCO2 31 PO2 65 bicarb 26 oxygen saturation 95% on room air     Chest x-ray shows no much changes     4/9     Patient resting the bed open eyes  On room air/breathing through the mouth     4/10     Patient open eyes not in distress breathing through her mouth  According to the nurse patient not sleep all day and all night yesterday        4/11  Patient is seen at bedside. She is awake but not in any distress. Patient is on zosyn. PEG tube in place.      Urine culture positive for Pseudomonas.     CXR shows no focal changes. Labs remarkable for WBC  22.3, Hgb 10.5, Na 153, , Cr 1.55 BUN 94, pro-calcitonin 1.64, and CRP 3.33.     4/12  Patient is on Vancomycin, zosyn and D5W. PEG tube in place. Spoke to daughter at bedside. She has no new complaints. Labs remarkable for:  WBC  18.9,  Cr 1.33, BUN 84,  and CRP 2.33 which are decreasing. Hgb 11.2, Na-corrected 158, , and pro-calcitonin 1.93 which are increasing.      Seen by pulmonology.  No new recommendations.     Seen by GI. No new recommendations.      Seen by nephrology. recommends IV D5W IVF to decrease hypernatremia.     Seen by ID. Recommends discontinuing fluconazole, follow up blood cultures, and continuing IV zosyn and vancomycin.         4/13/2022  Patient is on Vancomycin, zosyn and D5W. PEG tube in place. Nephrology - recommends IV D5W IVF to decrease hypernatremia. Increase glargine to 25 units twice daily to correct hyperglycemia. Once the D5W is discontinued, the dose of glargine will have to be decreased     ID - Continue IV Zosyn and Vancomycin; discontinue Vancomycin if WBC continues to decrease     Pulmonology - No new recommendations.     GI - No new recommendations.     CXR 4/11/2022 - No acute pulmonary process (no focal changes)      Labs remarkable for:  Blood Glucose 380  WBC  19.8  Hgb 10.3   Na-corrected 154  BUN 69  Cr 1.22  CRP 3.14  pro-calcitonin 2.28      4/14  Patient's family members are present at bedside with the patient. They notes that the patient was in distress this morning. Patient still on vancomycin and zosyn. PEG tube in place and patient is receiving enteral feedings.      Seen by vascular surgeon who recommends great toe amputation.      Labs remarkable for Na-corrected 154 improving, Cr 1.19 improving, pro-carrie 2.42,  and CRP 7.65.     4/15  Patient is seen grunting and restless at bedside. Daughter is at bedside. She has no new complaints.      UA on 4/14 still positive for WBC>100, leukocyte esterase and grew yeast.  Labs remarkable for WBC 15.9 decreasing, Hb 9.5, Na-corrected 154, and CRP 7.99.     Patient seen by ID. Recommends continuing zosyn and discontinuing vancomycin.     4/18  Patient is seen at bedside. She is awake and alert x3. She is able to say a few words this morning. She has no new complaints. She is on zosyn and fluconazole. PEG tube is clogged.     Seen by vascular surgeon. Recommends great toe and second toe amputation. Awaiting for consent from daughter.      Labs remarkable for WBC 11.4 decreasing, Hb 9.8, Na 145, and pro-calcitonin 2.01.     4/19  Patient was talking this morning and has no new complaints today. Patient is still receiving fluconazole. PEG tube in place and working.      Labs remarkable for WBC 12.9, Hgb 9.1, and CRP 7.68. Seen by ID. Recommends transitioning to oral fluconazole 200 mg daily for 11 more days.        History of present illness patient 54 physical at the time of admission as a patient admitted with sepsis UTI acute on chronic kidney disease hyponatremia patient history of CVA history of gangrene of the left great toe sacral decubitus ulcers patient was seen by wound care also seen by the vascular surgeon recommend to continue monitor the gangrene of the foot no surgery at this time patient also seen by the wound care nurse regarding sacral decubitus ulcer no need of debridement at this time patient was treated for hypernatremia and acute kidney injury by the nephrologist which significant improvement    Seen by infectious disease treated with IV Zosyn now discharging back to nursing home on full p.o. fluconazole    Other GI as PEG tube came out not functioning well replaced yesterday patient tolerated the procedure well and tolerating the feeding today    Otherwise patient remained stable follow-up with vascular surgeon as an outpatient regarding gangrene of the foot    Change position every 2 hours at the nursing home for sacral decubitus ulcer keep the wounds clean      Medication reconciliation done time shopping 35 minutes 50% time spent counseling on coordination of    Discharged on fluconazole 200 mg daily for 9 days    Discussed with the patient daughter in detail she is upset about the wound care/wound vacuum not placed    Vascular surgeon recommend wound vacuum placed at the time of discharge    Patient daughter want to talk to vascular surgeon  regarding wound vacuum      Pending discharge to skilled care rehab  Waiting for placement    Family wants patient's puppy to visit her  Discussed with the nurse she will talk to  nursing supervisor    Signed:   Sarahi Nunez MD  4/24/2022  1:39 PM

## 2022-04-24 NOTE — PROGRESS NOTES
Telemetry monitor applied to patient. Daughter upset that it was taken off on 4-. She requests that we please not use the nurse driven protocol to remove the monitor as she would like for it to be kept on her mother until discharge. Dr barba ordered to reapply monitor. I verfied with the tele monitor tech that the rhythm is sinus rhythm and the rate is 70. Old dressings removed from sacral wound and from left foot. Wound cleansed with NS, pat dry, NS wet to dry dressing applied, secured with tape. Dressing around PEG tube changed  As well. Area around peg tube cleansed with NS, pat dry and dry dressing applied , secured with tape.  Patient tolerated well

## 2022-04-25 NOTE — PROGRESS NOTES
CM followed up with patient's daughter, Blanca Ann, at [phone] to inform her all SNF's she has chosen have declined. CM informed her that due to insurance denying SNF stay, patient would be going under her Medicaid/LTC benefit. Initially she stated they do not want LTC. We discussed the options of the patient returning home with family. She inquired what the patient would be able to get as far as care and DME. She would likely be able to get a hospital bed, wheelchair, tube feeds, and Home Health for wound care, PT and OT. She asked about additional care and SHIRLEY explained with medicaid she could get a personal care aide. Daughter stated she is her PCA but she does not feel she can accommodate her wound care needs. CM explained nursing facility placement might be the best option at this time even if it is LTC. Daughter was agreeable for CM to send a mass referral to nursing facilities to see who was able to accept under LTC benefit. Once we have accepting facilities the family will then make a final decision on which facility they prefer. Referrals have been sent.

## 2022-04-25 NOTE — DISCHARGE SUMMARY
Discharge Summary       PATIENT ID: Lorraine Fu  MRN: [id number]   YOB: 1945    DATE OF ADMISSION: 4/3/2022 10:36 PM    DATE OF DISCHARGE:   PRIMARY CARE PROVIDER: Severiano Alley., MD     ATTENDING PHYSICIAN: Nancy Soto  DISCHARGING PROVIDER: Nancy Soto      CONSULTATIONS: IP CONSULT TO NEPHROLOGY  IP CONSULT TO INFECTIOUS DISEASES  IP CONSULT TO NEPHROLOGY  IP CONSULT TO NEPHROLOGY  IP CONSULT TO GASTROENTEROLOGY  IP CONSULT TO GASTROENTEROLOGY  IP CONSULT TO PULMONOLOGY  IP CONSULT TO GENERAL SURGERY  IP CONSULT TO GENERAL SURGERY    PROCEDURES/SURGERIES: Procedure(s):  SACRAL DEBRIDEMENT    ADMITTING DIAGNOSES:    Patient Active Problem List    Diagnosis Date Noted    Hyperkalemia 04/04/2022    UTI (urinary tract infection) 04/04/2022    Sepsis ([address] 04/04/2022    RICARDO (acute kidney injury) ([address] 04/04/2022    AMS (altered mental status) 04/04/2022    CVA (cerebral vascular accident) ([address] 02/21/2022    Elevated troponin 02/21/2022    Ischemia of both lower extremities 07/22/2020    Pain in both lower legs 07/22/2020    Acute osteomyelitis of ankle or foot ([address] 04/04/2019    Diabetic peripheral neuropathy ([address] 04/04/2019    Spinal stenosis in cervical region 04/04/2019    Type 2 diabetes mellitus with nephropathy ([address] 01/31/2018    DM type 2 causing vascular disease ([address] 07/27/2017    Hypercalcemia 12/12/2016    PAD (peripheral artery disease) ([address] 04/11/2016    Infection of nailbed of toe of left foot 09/10/2015    Diabetes mellitus with neurological manifestations, uncontrolled 08/08/2013    HTN (hypertension)     Hyperlipidemia     Neuropathy     Sciatica        DISCHARGE DIAGNOSES / PLAN:      Severe sepsis with UTI  UTI/Pseudomonas  Acute on chronic kidney disease  Hyperkalemia  Hypernatremia, improving  CVA with right-sided weakness  Anemia of chronic disease  Gangrene of the left great toe  Sacral decubitus ulcer  Acute respiratory failure  Possible aspiration  Elevated BNP  PEG tube malfunction/seen by the GI now working   Gangrene of the left great toe-s/p removal of left great and second toe  Static post debridement of the sacral wound        DISCHARGE MEDICATIONS:  Current Discharge Medication List      START taking these medications    Details   albuterol-ipratropium (DUO-NEB) 2.5 mg-0.5 mg/3 ml nebu 3 mL by Nebulization route every six (6) hours as needed for Wheezing or Shortness of Breath. Qty: 30 Nebule, Refills: 1  Start date: 4/19/2022      hydrALAZINE (APRESOLINE) 50 mg tablet Take 1 Tablet by mouth three (3) times daily. Qty: 60 Tablet, Refills: 0  Start date: 4/19/2022      metoprolol tartrate (LOPRESSOR) 50 mg tablet Take 1 Tablet by mouth two (2) times a day. Qty: 60 Tablet, Refills: 0  Start date: 4/19/2022      fluconazole (DIFLUCAN) 100 mg tablet Take 2 Tablets by mouth daily for 7 days. Fluconazole 200 mg daily for 11 days  Qty: 11 Tablet, Refills: 0  Start date: 4/19/2022, End date: 4/26/2022         CONTINUE these medications which have NOT CHANGED    Details   gabapentin (NEURONTIN) 300 mg capsule Take 1 Capsule by mouth two (2) times a day. Max Daily Amount: 600 mg. Qty: 12 Capsule, Refills: 0    Associated Diagnoses: Pain      insulin glargine (LANTUS) 100 unit/mL injection 50 Units by SubCUTAneous route daily. Qty: 10 mL, Refills: 0      acidophilus-pectin, citrus 25 million cell -100 mg tab tablet Take 2 Tablets by mouth daily. Qty: 30 Tablet, Refills: 0      amLODIPine (NORVASC) 5 mg tablet Take 1 Tablet by mouth daily. Qty: 30 Tablet, Refills: 0      artificial saliva (MOUTH KOTE) spra Take 1 Spray by mouth three (3) times daily. Qty: 10 mL, Refills: 0      latanoprost (XALATAN) 0.005 % ophthalmic solution Administer 1 Drop to right eye every evening.   Qty: 10 mL, Refills: 0      atorvastatin (LIPITOR) 20 mg tablet TAKE 1 TABLET BY MOUTH EVERY NIGHT  Qty: 30 Tablet, Refills: 5    Associated Diagnoses: Type II or unspecified type diabetes mellitus with neurological manifestations, uncontrolled(250.62) ([facility] Essential hypertension; Hyperlipidemia, unspecified hyperlipidemia type; Vitamin D deficiency; Vitamin B12 deficiency; Neuropathy; Type 2 diabetes mellitus with hyperglycemia, with long-term current use of insulin ([facility] [address] Diabetes mellitus with neurological manifestations, uncontrolled; Mixed hyperlipidemia; PAD (peripheral artery disease) ([facility] [address] Hypercalcemia; Uncontrolled type 2 diabetes mellitus with hyperglycemia, with long-term current use of insulin (HCC)      brimonidine (ALPHAGAN) 0.2 % ophthalmic solution Administer 1 Drop to both eyes three (3) times daily. aspirin delayed-release 81 mg tablet Take  by mouth daily. ferrous sulfate 325 mg (65 mg iron) tablet Take 1 Tab by mouth two (2) times a day. Qty: 60 Tab, Refills: 5    Associated Diagnoses: Diabetes mellitus with neurological manifestations, uncontrolled; Essential hypertension; PAD (peripheral artery disease) ([facility] [address] Mixed hyperlipidemia; Type 2 diabetes mellitus with hyperglycemia, with long-term current use of insulin ([facility] [address] Hypercalcemia; Uncontrolled type 2 diabetes mellitus with hyperglycemia, with long-term current use of insulin ([facility] Type II or unspecified type diabetes mellitus with neurological manifestations, uncontrolled(250.62) ([address] Hyperlipidemia, unspecified hyperlipidemia type; Vitamin D deficiency; Vitamin B12 deficiency; Neuropathy         STOP taking these medications       lidocaine (XYLOCAINE) 4 % (40 mg/mL) topical solution Comments:   Reason for Stopping:         metoprolol succinate (TOPROL-XL) 50 mg XL tablet Comments:   Reason for Stopping:                 NOTIFY YOUR PHYSICIAN FOR ANY OF THE FOLLOWING:   Fever over 101 degrees for 24 hours. Chest pain, shortness of breath, fever, chills, nausea, vomiting, diarrhea, change in mentation, falling, weakness, bleeding.  Severe pain or pain not relieved by medications. Or, any other signs or symptoms that you may have questions about. DISPOSITION:  x  Home With:   OT  PT  HH  RN       Long term SNF/Inpatient Rehab    Independent/assisted living    Hospice    Other:       PATIENT CONDITION AT DISCHARGE: Stable      PHYSICAL EXAMINATION AT DISCHARGE:  General:          Alert, cooperative, no distress, appears stated age. HEENT:           Atraumatic, anicteric sclerae, pink conjunctivae                          No oral ulcers, mucosa moist, throat clear, dentition fair  Neck:               Supple, symmetrical  Lungs:             Clear to auscultation bilaterally. No Wheezing or Rhonchi. No rales. Chest wall:      No tenderness  No Accessory muscle use. Heart:              Regular  rhythm,  No  murmur   No edema  Abdomen:        Soft, non-tender. Not distended. Bowel sounds normal  Extremities:     No cyanosis. No clubbing,                            Skin turgor normal, Capillary refill normal  Skin:                Not pale. Not Jaundiced  No rashes   Psych:             Not anxious or agitated. Neurologic:      Alert, moves all extremities, answers questions appropriately and responds to commands     XR SWALLOW FUNC VIDEO   Final Result   For all attempts, a delayed oral phase was noted. Nectar consistency: Pooling in the vallecula with no penetration or aspiration   Thin liquid consistency: Residue in the vallecula. No penetration or aspiration   Honey consistency: Residue in the vallecula. No penetration or aspiration   Puree consistency: No penetration or aspiration. 1 minute 55 seconds of fluoroscopy   DAP: 229.1   14 mGy   Single fluoroscopic image stored on PACS      XR ABD (KUB)   Final Result   Percutaneous gastrostomy tube tip in the gastric antrum. XR CHEST PORT   Final Result   No acute pulmonary process. XR CHEST PORT   Final Result   No significant change.       XR CHEST PORT   Final Result      XR CHEST PORT   Final Result   No acute cardiopulmonary abnormality. .       XR CHEST PORT   Final Result      CT HEAD WO CONT   Final Result   Chronic changes which appear stable. No acute or active intracranial process. Chronic paranasal sinus disease         XR CHEST PORT   Final Result   No acute findings. Recent Results (from the past 24 hour(s))   GLUCOSE, POC    Collection Time: 04/24/22  5:59 PM   Result Value Ref Range    Glucose (POC) 191 (H) 65 - 117 mg/dL    Performed by Sonali Odonnell, POC    Collection Time: 04/24/22 10:37 PM   Result Value Ref Range    Glucose (POC) 213 (H) 65 - 117 mg/dL    Performed by Ulysses Hamel    GLUCOSE, POC    Collection Time: 04/25/22  5:58 AM   Result Value Ref Range    Glucose (POC) 157 (H) 65 - 117 mg/dL    Performed by Ulysses Hamel    CBC WITH AUTOMATED DIFF    Collection Time: 04/25/22  7:27 AM   Result Value Ref Range    WBC 14.1 (H) 3.6 - 11.0 K/uL    RBC 3.37 (L) 3.80 - 5.20 M/uL    HGB 9.6 (L) 11.5 - 16.0 g/dL    HCT 30.5 (L) 35.0 - 47.0 %    MCV 90.5 80.0 - 99.0 FL    MCH 28.5 26.0 - 34.0 PG    MCHC 31.5 30.0 - 36.5 g/dL    RDW 17.2 (H) 11.5 - 14.5 %    PLATELET 175 616 - 700 K/uL    MPV 11.5 8.9 - 12.9 FL    NRBC 0.0 0.0  WBC    ABSOLUTE NRBC 0.00 0.00 - 0.01 K/uL    NEUTROPHILS 78 (H) 32 - 75 %    LYMPHOCYTES 16 12 - 49 %    MONOCYTES 4 (L) 5 - 13 %    EOSINOPHILS 1 0 - 7 %    BASOPHILS 0 0 - 1 %    IMMATURE GRANULOCYTES 1 (H) 0 - 0.5 %    ABS. NEUTROPHILS 10.9 (H) 1.8 - 8.0 K/UL    ABS. LYMPHOCYTES 2.3 0.8 - 3.5 K/UL    ABS. MONOCYTES 0.6 0.0 - 1.0 K/UL    ABS. EOSINOPHILS 0.2 0.0 - 0.4 K/UL    ABS. BASOPHILS 0.1 0.0 - 0.1 K/UL    ABS. IMM.  GRANS. 0.2 (H) 0.00 - 0.04 K/UL    DF AUTOMATED     C REACTIVE PROTEIN, QT    Collection Time: 04/25/22  7:27 AM   Result Value Ref Range    C-Reactive protein 6.30 (H) 0.00 - 0.60 mg/dL   PROCALCITONIN    Collection Time: 04/25/22  7:27 AM   Result Value Ref Range    Procalcitonin 0.59 (H) 0 ng/mL   GLUCOSE, POC    Collection Time: 04/25/22 11:08 AM   Result Value Ref Range    Glucose (POC) 214 (H) 65 - 117 mg/dL    Performed by Dev 30:    HPI: Patient is a 69 y. o. year old female with signal past medical history of CVA with PEG tube left great toe gangrene, chronic kidney disease CVA with right-sided weakness hypertension type 2 diabetes bedridden open eyes do not follow any command was transferred to the hospital with fever and altered mental status as per SNF staff patient was awake and following simple command at the nursing home facility and since yesterday not able to follow any command confused transferred to the ER seen by the ER physician work-up shows acute kidney injury with hyperkalemia        Admitted for further evaluation and treat.     04/05   Patient is seen at bedside with daughter and nephew today. Patient is in distress due to pain and daughter notes that patient gets Neurontin TID for neuropathy daily. Otherwise, patient is still receiving enteric feeding through PEG tube and getting IVF.      Patient was seen by nephrology yesterday. Recommends obtaining renal panel and continuing IVF. Patient was seen by ID yesterday. Recommends continuing IV vancomycin for urosepsis.      Patient was seen by pulmonology today. Recommends PRBC transfusion.      Labs indicate WBC 16.9, Hgb 6.3, Na 148, Cl 120, BUN 72, Cr 1.71, Ca 8, CRP 28.4, pro-carrie 6.93 and .         04/06   Patient is seen at bedside. Patient received 2 units of PRBC. Patient is on Zosyn. No new changes today.      Patient seen by the vascular surgeon he recommend great toe amputation  And for sacral wound recommend Medihoney ointment      Labs show increasing Hgb of 9, decreasing WBC 14.7, Na 147, , BUN 55,   Cr 1.47, and CRP 21.4.     CXR is unremarkable.      04/07  Patient is seen resting at bedside. Patient received one unit of PRBC with increased Hgb of 10.6.  No acute changes today.     Patient is seen by nephrology who recommends free water flushes to correct hypernatremia.      Labs show increasing Hgb of 10.6, WBC 15.1, pro-calcitonin 3.78, and CRP 16. 1.     4/8     Patient had a rapid response this morning ABG and chest x-ray was ordered  Patient tachycardic tachypneic  ABG  pH 7.51 PCO2 31 PO2 65 bicarb 26 oxygen saturation 95% on room air     Chest x-ray shows no much changes     4/9     Patient resting the bed open eyes  On room air/breathing through the mouth     4/10     Patient open eyes not in distress breathing through her mouth  According to the nurse patient not sleep all day and all night yesterday        4/11  Patient is seen at bedside. She is awake but not in any distress. Patient is on zosyn. PEG tube in place.      Urine culture positive for Pseudomonas.     CXR shows no focal changes. Labs remarkable for WBC  22.3, Hgb 10.5, Na 153, , Cr 1.55 BUN 94, pro-calcitonin 1.64, and CRP 3.33.     4/12  Patient is on Vancomycin, zosyn and D5W. PEG tube in place. Spoke to daughter at bedside. She has no new complaints. Labs remarkable for:  WBC  18.9,  Cr 1.33, BUN 84,  and CRP 2.33 which are decreasing. Hgb 11.2, Na-corrected 158, , and pro-calcitonin 1.93 which are increasing.      Seen by pulmonology. No new recommendations.     Seen by GI. No new recommendations.      Seen by nephrology. recommends IV D5W IVF to decrease hypernatremia.     Seen by ID. Recommends discontinuing fluconazole, follow up blood cultures, and continuing IV zosyn and vancomycin.         4/13/2022  Patient is on Vancomycin, zosyn and D5W. PEG tube in place. Nephrology - recommends IV D5W IVF to decrease hypernatremia. Increase glargine to 25 units twice daily to correct hyperglycemia.  Once the D5W is discontinued, the dose of glargine will have to be decreased     ID - Continue IV Zosyn and Vancomycin; discontinue Vancomycin if WBC continues to decrease     Pulmonology - No new recommendations.     GI - No new recommendations.     CXR 4/11/2022 - No acute pulmonary process (no focal changes)      Labs remarkable for:  Blood Glucose 380  WBC  19.8  Hgb 10.3   Na-corrected 154  BUN 69  Cr 1.22  CRP 3.14  pro-calcitonin 2.28      4/14  Patient's family members are present at bedside with the patient. They notes that the patient was in distress this morning. Patient still on vancomycin and zosyn. PEG tube in place and patient is receiving enteral feedings.      Seen by vascular surgeon who recommends great toe amputation.      Labs remarkable for Na-corrected 154 improving, Cr 1.19 improving, pro-carrie 2.42,  and CRP 7.65.     4/15  Patient is seen grunting and restless at bedside. Daughter is at bedside. She has no new complaints.      UA on 4/14 still positive for WBC>100, leukocyte esterase and grew yeast.  Labs remarkable for WBC 15.9 decreasing, Hb 9.5, Na-corrected 154, and CRP 7.99.     Patient seen by ID. Recommends continuing zosyn and discontinuing vancomycin.     4/18  Patient is seen at bedside. She is awake and alert x3. She is able to say a few words this morning. She has no new complaints. She is on zosyn and fluconazole. PEG tube is clogged.     Seen by vascular surgeon. Recommends great toe and second toe amputation. Awaiting for consent from daughter.      Labs remarkable for WBC 11.4 decreasing, Hb 9.8, Na 145, and pro-calcitonin 2.01.     4/19  Patient was talking this morning and has no new complaints today. Patient is still receiving fluconazole. PEG tube in place and working.      Labs remarkable for WBC 12.9, Hgb 9.1, and CRP 7.68. Seen by ID. Recommends transitioning to oral fluconazole 200 mg daily for 11 more days.        History of present illness patient 54 physical at the time of admission as a patient admitted with sepsis UTI acute on chronic kidney disease hyponatremia patient history of CVA history of gangrene of the left great toe sacral decubitus ulcers patient was seen by wound care also seen by the vascular surgeon recommend to continue monitor the gangrene of the foot no surgery at this time patient also seen by the wound care nurse regarding sacral decubitus ulcer no need of debridement at this time patient was treated for hypernatremia and acute kidney injury by the nephrologist which significant improvement    Seen by infectious disease treated with IV Zosyn now discharging back to nursing home on full p.o. fluconazole    Other GI as PEG tube came out not functioning well replaced yesterday patient tolerated the procedure well and tolerating the feeding today    Otherwise patient remained stable follow-up with vascular surgeon as an outpatient regarding gangrene of the foot    Change position every 2 hours at the nursing home for sacral decubitus ulcer keep the wounds clean      Medication reconciliation done time shopping 35 minutes 50% time spent counseling on coordination of    Discharged on fluconazole 200 mg daily for 9 days    Discussed with the patient daughter in detail she is upset about the wound care/wound vacuum not placed    Vascular surgeon recommend wound vacuum placed at the time of discharge    Patient daughter want to talk to vascular surgeon  regarding wound vacuum      Pending discharge to skilled care rehab  Waiting for placement      Discussed with the patient daughter at the bedside    Signed:   Vinita Terrell MD  4/25/2022  1:39 PM

## 2022-04-25 NOTE — PROGRESS NOTES
SPEECH LANGUAGE PATHOLOGY DYSPHAGIA TREATMENT  Patient: Rossy Ramires (79 y.o. female)  Date: 4/25/2022  Diagnosis: Sepsis ([facility] [address] [A41.9]  UTI (urinary tract infection) [N39.0]  AMS (altered mental status) [R41.82]  RICARDO (acute kidney injury) ([facility] [address] [N17.9]  Hyperkalemia [E87.5] <principal problem not specified>  Procedure(s) (LRB):  SACRAL DEBRIDEMENT (N/A) 4 Days Post-Op  Precautions: aspiration     ASSESSMENT:  Followed-up w/ patient s/p MBS. Daughter present in room. Educated daughter to [facility] results, diet recommendations, aspiration risk and compensatory strategies. Administered patient mildly thick liquids via spoon and cup. Reduced oral opening for bolus acceptance via spoon. Reduced labial seal. Labial seal improved w/ use of cup. Minimal anterior spillage. Slow A-P transit w/ delay in the initiation of swallow. No clinical indicators of penetration and and aspiration. Administered puree ( applesauce ) trials. Bolus acceptance improved. Trace residual on lips which patient was able to clear independently but not w/ verbal cues. Slow transit and swallow delay. Again, no overt s/sx of aspiration. Improve oral response during bedside trials compared to MBS. Patient continues to be an aspiration risk and nutritional risk. Recommend full, mildly thick for pleasure and TF via PEG for primary source of nutrition. PLAN:  Recommendations and Planned Interventions:  Full, mildly thick for pleasure and therapeutic trials. Continue to work w/ SLP. Aspiration precautions for p.o, intake and TF. TF via PEG for primary source  Patient continues to benefit from skilled intervention to address the above impairments. Continue treatment per established plan of care. Frequency/Duration: Patient will be followed by speech-language pathology daily to address goals. Discharge Recommendations:  Skilled Nursing Facility     SUBJECTIVE:   Patient alert and see at bedside. Daughter present in room. OBJECTIVE:     CXR Results  (Last 48 hours)      None          CT Results  (Last 48 hours)      None           Cognitive and Communication Status:  Neurologic State: Alert,Eyes open spontaneously  Orientation Level: Oriented to person,Oriented to place  Cognition: Decreased attention/concentration       Pain:  Pain Scale 1: Numeric (0 - 10)  Pain Intensity 1: 0       After treatment:   Patient left in no apparent distress in bed, Call bell within reach, and Caregiver / family present    COMMUNICATION/EDUCATION:   Patient was educated regarding her deficit(s) of dysphagia, swallow safety precautions, diet recs and POC. She demonstrated Fair understanding as evidenced by impaired cognition. The patient's plan of care including recommendations, planned interventions, and recommended diet changes were discussed with: Physician. Jovanna Gonsales, SLP M.S. CCC-SLP  Time Calculation: 18 mins           Problem: Dysphagia (Adult)  Goal: *Acute Goals and Plan of Care (Insert Text)  Description: Speech Therapy Swallow Goals  Initiated 4/22/2022        -Patient will tolerate PO trials without clinical indicators of aspiration given moderate cues within 5-7day(s). -Patient will participate in modified barium swallow study within 5-7 day(s). [MET]      -Patient will demonstrate understanding of swallow safety precautions and aspiration precautions, diet recs with moderate cues within 5-7 day(s).     -Patient will participate in speech-language evaluation as indicated within 5-7 days    -Patient will tolerate Full, mildly thick trials w/o clinical indicators of aspiration and improved bolus oral clearance/management within 5-7 days  4/25/2022 1333 by BRYAN Reeves  Outcome: Progressing Towards Goal  4/25/2022 0949 by BRYAN Reeves  Outcome: Progressing Towards Goal

## 2022-04-25 NOTE — PROGRESS NOTES
Problem: Diabetes Self-Management  Goal: *Disease process and treatment process  Description: Define diabetes and identify own type of diabetes; list 3 options for treating diabetes. Outcome: Progressing Towards Goal  Goal: *Incorporating nutritional management into lifestyle  Description: Describe effect of type, amount and timing of food on blood glucose; list 3 methods for planning meals. Outcome: Progressing Towards Goal  Goal: *Incorporating physical activity into lifestyle  Description: State effect of exercise on blood glucose levels. Outcome: Progressing Towards Goal  Goal: *Developing strategies to promote health/change behavior  Description: Define the ABC's of diabetes; identify appropriate screenings, schedule and personal plan for screenings. Outcome: Progressing Towards Goal  Goal: *Using medications safely  Description: State effect of diabetes medications on diabetes; name diabetes medication taking, action and side effects. Outcome: Progressing Towards Goal  Goal: *Monitoring blood glucose, interpreting and using results  Description: Identify recommended blood glucose targets  and personal targets. Outcome: Progressing Towards Goal  Goal: *Prevention, detection, treatment of acute complications  Description: List symptoms of hyper- and hypoglycemia; describe how to treat low blood sugar and actions for lowering  high blood glucose level. Outcome: Progressing Towards Goal  Goal: *Prevention, detection and treatment of chronic complications  Description: Define the natural course of diabetes and describe the relationship of blood glucose levels to long term complications of diabetes.   Outcome: Progressing Towards Goal  Goal: *Developing strategies to address psychosocial issues  Description: Describe feelings about living with diabetes; identify support needed and support network  Outcome: Progressing Towards Goal  Goal: *Insulin pump training  Outcome: Progressing Towards Goal  Goal: *Sick day guidelines  Outcome: Progressing Towards Goal  Goal: *Patient Specific Goal (EDIT GOAL, INSERT TEXT)  Outcome: Progressing Towards Goal     Problem: Patient Education: Go to Patient Education Activity  Goal: Patient/Family Education  Outcome: Progressing Towards Goal     Problem: Pressure Injury - Risk of  Goal: *Prevention of pressure injury  Description: Document Shakir Scale and appropriate interventions in the flowsheet. Outcome: Progressing Towards Goal  Note: Pressure Injury Interventions:  Sensory Interventions: Turn and reposition approx. every two hours (pillows and wedges if needed),Check visual cues for pain,Float heels    Moisture Interventions: Apply protective barrier, creams and emollients    Activity Interventions: Pressure redistribution bed/mattress(bed type)    Mobility Interventions: Turn and reposition approx. every two hours(pillow and wedges)    Nutrition Interventions: Document food/fluid/supplement intake    Friction and Shear Interventions: Apply protective barrier, creams and emollients                Problem: Patient Education: Go to Patient Education Activity  Goal: Patient/Family Education  Outcome: Progressing Towards Goal     Problem: Falls - Risk of  Goal: *Absence of Falls  Description: Document Maryann Fall Risk and appropriate interventions in the flowsheet.   Outcome: Progressing Towards Goal  Note: Fall Risk Interventions:  Mobility Interventions: Bed/chair exit alarm    Mentation Interventions: Bed/chair exit alarm    Medication Interventions: Bed/chair exit alarm    Elimination Interventions: Call light in reach              Problem: Patient Education: Go to Patient Education Activity  Goal: Patient/Family Education  Outcome: Progressing Towards Goal     Problem: Impaired Skin Integrity/Pressure Injury Treatment  Goal: *Improvement of Existing Pressure Injury  Outcome: Progressing Towards Goal  Goal: *Prevention of pressure injury  Description: Document Shakir Scale and appropriate interventions in the flowsheet. Outcome: Progressing Towards Goal  Note: Pressure Injury Interventions:  Sensory Interventions: Turn and reposition approx. every two hours (pillows and wedges if needed),Check visual cues for pain,Float heels    Moisture Interventions: Apply protective barrier, creams and emollients    Activity Interventions: Pressure redistribution bed/mattress(bed type)    Mobility Interventions: Turn and reposition approx.  every two hours(pillow and wedges)    Nutrition Interventions: Document food/fluid/supplement intake    Friction and Shear Interventions: Apply protective barrier, creams and emollients                Problem: Patient Education: Go to Patient Education Activity  Goal: Patient/Family Education  Outcome: Progressing Towards Goal     Problem: Patient Education: Go to Patient Education Activity  Goal: Patient/Family Education  Outcome: Progressing Towards Goal

## 2022-04-25 NOTE — PROGRESS NOTES
Nutrition Assessment     Type and Reason for Visit: Reassess,NPO/clear liquid (Goal, PEG)    Nutrition Recommendations/Plan:   1. NPO, advance when rec'd by SLP. 2. Continue TF via PEG as Glucerna 1.5 Mahad at goal rate 60 mL/hr. 3. Flush 200 mL H2O Q4H via PEG. Provides 2160 kcal(100%), 119 gm PRO(105%), 2352 mL H20(97%). 4. Monitor TF rate, flushes, GRVs, and BMs in I/Os     Nutrition Assessment:   Admitted for AMS 2/2 UTI. PEG in place, TF started (4/4). Hypertensive and tachypnic this AM, transferred to ICU. Per ICU RN, TF held all day d/t concern for aspiration pna aeb fever (102.2F Tmax) though CXR shows lungs are clear. Discussed restarting TF as able, no changes in regimen. Noted that TF not advanced past 50ml/hr while pt on medical unit, unsure why. (4/11) Pt in bed on medical floor s/p transfer from ICU today; TF to resume s/p meds admin per Nsg. Noted water flushes increased to 350 mL Q4H w/o sig. change in lytes and TF remains as Glucerna and SSI; consider Diabetes Insipidus vs dehydration. Observed on IVF(D5)? Will adjust flush and monitor lytes. (4/14)TF running as Gluc 1.5 at goal rate w/o report of intolerance. TF continues to meet >80% of EENs. Consistent elevated BGs trended- D5 noted to be increased? Discussed w/ Nephro, SSI to be adjusted for BG management. RD rec's continuing regimen, manage BGs w/ SSI. (4/18) PEG noted to be displaced during care, plan to replace today. (4/21) PEG replaced, feeds resumed, no issues tolerating. Underwent L great toe and 2nd toe amputation today. Possible plan for eventual colostomy once pt can tolerate anesthesia. Will provide bolus rec's for discharge. (4/25)Pt in room w/ daughter and RN. TF running at goal rate and reported as tolerated w/ [employer] pkt. SLP noted trail of Full, Mildly Thick today. Will continue TF and adjust when appropriate for increased intake. Continue regimen. Labs: H/H 9.6/30.5, POC -236 mg/dL.  Meds: FerrouSul, humalog, lantus, hydralazine, lopressor, merrem, heparin, lipitor, norvasc. Malnutrition Assessment:  Malnutrition Status: No malnutrition     Estimated Daily Nutrient Needs:  Energy (kcal):  [id number] (25-30kcals/kg consider bedbound 2/2 CVA and wounds)  Protein (g):  130g (1.5g/kg - wounds)       Fluid (ml/day):  2165mL (25mL/kg)    Nutrition Related Findings:  No N/V/D/C nor s/s of aspiration reported. SLP trial w/ Full/Mildly Thick Liq diet. Last BM 4/25-FSM remain in place. No edema. Current Nutrition Therapies:  ADULT TUBE FEEDING PEG; Diabetic; Delivery Method: Continuous; Continuous Initial Rate (mL/hr): 60; Continuous Advance Tube Feeding: No; Water Flush Volume (mL): 200; Water Flush Frequency: Q 4 hours; Modulars/Additives: Protein; Specify How to Ad. .. DIET NPO Ice Chips    Anthropometric Measures:  Height:  5' 7.99\" (172.7 cm)  Current Body Wt:     BMI: 29    Nutrition Diagnosis:   · Biting/chewing (masticatory) difficulty,Swallowing difficulty related to cognitive or neurological impairment as evidenced by nutrition support-enteral nutrition    Nutrition Interventions:   Food and/or Nutrient Delivery: Continue NPO,Continue tube feeding  Nutrition Education/Counseling: No recommendations at this time  Coordination of Nutrition Care: Continue to monitor while inpatient,Speech therapy,Feeding assistance/environmental change    Goals:  Previous Goal Met: Progressing toward goal(s)  Goals: Meet at least 75% of estimated needs,by next RD assessment,Tolerate nutrition support at goal rate    Nutrition Monitoring and Evaluation:   Behavioral-Environmental Outcomes: None identified  Food/Nutrient Intake Outcomes: Enteral nutrition intake/tolerance,Diet advancement/tolerance  Physical Signs/Symptoms Outcomes: Biochemical data,Weight,GI status,Skin    Discharge Planning: Too soon to determine    Nadja Eugene RD  Contact: ext. [phone] or [employer].

## 2022-04-25 NOTE — PROGRESS NOTES
Modified Barium Swallow Evaluation    Patient: Chele Ng (32 y.o. female)  Date: 4/25/2022  Primary Diagnosis: Sepsis ([facility] [address] [A41.9]  UTI (urinary tract infection) [N39.0]  AMS (altered mental status) [R41.82]  RICARDO (acute kidney injury) ([facility] [address] [N17.9]  Hyperkalemia [E87.5]  Procedure(s) (LRB):  SACRAL DEBRIDEMENT (N/A) 4 Days Post-Op   Precautions: aspiration       ASSESSMENT :  Based on the objective data described below, the patient presents with moderate-severe oral and oropharyngeal dysphagia c/b oral motor apraxia, bolus holding, anterior spillage, reduced lingual involvement for bolus manipulation and swallow delay. No penetration or aspiration observed w/ all consistencies administered ( thin, mildly, moderately and pudding think), however patient remains at risk. Patient w/ head turn to the right. Verbal and tactile cues to keep head neutral.   ACDF C3-C6/7? noted. Oral phase c/b reduced oral opening for bolus acceptance, reduced labial closure around spoon, intermittent bite on spoon, anterior spillage w/ thin and mildly thick consistencies. Patient w/ slight head tilt for gravity to propel bolus. Minimal lingual movement for bolus control and A-P transit. Intermittent bolus holding w/ thicker consistencies. Spoon stim and tactile cues needed w/ pudding trial. Free spill over BOT to the vallecular space w/ thin and mildly thick. Swallow delay present w/ all trials w// increase in delay for thicker consistencies ( moderately thick and pudding ) of a 15-20 second delay. Reduced TBR. Oral residual present after the swallow. Pharyngeal phase c/b once swallow is initiated, Hyolaryngeal excursion and protraction adequate, epiglottic inversion intact w/ intermittent reduction. Weight of consistency aids in inversion. There is vallecular stasis after the swallow. Minimal residuals in the pyriforms. There is no penetration or aspiration observed w/ thin via tsp and cup.  Attempts made at use of straws, however due to patient's oral apraxia, patient unable to use. No penetration or aspiration observed w/ mildly thick, moderately thick or pudding consistency. Solids not trailed s/t severity of oral dysphagia at this time. UES WFL        Patient will benefit from skilled intervention to address the above impairments. Patients rehabilitation potential is considered to be Good     PLAN :  Recommendations and Planned Interventions:  Trial of full, mildly thick diet. Continue w/ ice chips. TF via PEG for primary nutrition. Feed w/ SLP, nurse or tech only. Check mouth for holding and bolus clearance prior to next bite/sip. Verbal and tactile cues. Spoon stim as needed. STRICT aspiration precautions for TF and p.o. trials. Frequency/Duration: Patient will be followed by speech-language pathology daily to address goals. Discharge Recommendations: Skilled Nursing Facility     SUBJECTIVE:   Patient stated \"Im sick of yall. Educated patient to purpose of MBS. OBJECTIVE:     Past Medical History:   Diagnosis Date    Diabetes mellitus ([facility] [address]     HTN (hypertension)     Hyperlipidemia     Neuropathy     PAD (peripheral artery disease) (HCC)     PCI    Sciatica      Past Surgical History:   Procedure Laterality Date    HX AMPUTATION Right     great toe    HX CERVICAL FUSION      HX OTHER SURGICAL Bilateral     Stent placement    HX OTHER SURGICAL      HX VASCULAR STENT Bilateral     2 in right 1 in left    HX VASCULAR STENT Bilateral         CXR Results  (Last 48 hours)      None          Current Diet:  NPO  Radiologist:  Antonino Figueroa          Video Flouroscopic Procedures  [x] Lateral View   [] A-P View [] Scanned to level of Sternum    [] Seated at 90 deg.   [] Other:    Presentation:   [x] Spoon   [x] Cup   [x] Straw   [] Syringe   [] Consecutive Swallows  [] Other:    Consistencies:   [x] Ba+ liquid   [x] Ba+ liquid (nectar)   [x] Ba+ liquid (honey)     [x] Ba+ pudding   [] Ba+ crunched cookie   [] Ba+ cookie   [] Other:     Testing Discontinued: [] Due to:    Treatment Techniques Attempted     [] Head Turn: [] Right [] Left     [] Head Tilt: [] Right [] Left     [] Chin Down:  [] Thermal Sensitization:  [] Supraglottic Swallow:  [] Mendelson's Maneuver:  [x] Other: spoon stim    Results  Dysphagia Present:    [x] Yes  [] No    Ratings of Dysphagia:    [] Mild  [x] Moderate [x] Severe    Stages of Breakdown:   [x] Oral      [x] Oral Preparatory [] Pharyngeal   [] Esophageal    Aspiration: [] Yes    [x] No  [x] At Risk  [] Trace (<10%) [] Significant (>10%):     %  [] Penetration: Level of:   Cough: [] Yes      [] No      Motility Problems with:  [x] Lip Closure:   [x] Sucking:   [x] Mastication:   [x] Bolus Formation:   [x] Bolus Control:  [x] A-P Transport:  [x] Posterior Tongue Elevation:  [x] Swallow Response (delayed):  [] Velopharyngeal Closure:  [] Laryngeal Elevation:  [] Laryngeal Adduction:  [] Cricopharyngeal Relaxation:  [] Esophageal Peristalsis:  [] Reflux:  [] Other:      Transit Time Delay:  [x] >1 Second  Oral  [x] >1 Second Pharyngeal  [] >20 Second Esophageal     Residuals:  [] Buccal Cavity   [] Velum/posterior pharyngeal wall  [x] Valleculae  [x] Pyriforms    Pain:    8-point Penetration-Aspiration Scale Score: 1  Clinical Judgement  COMMUNICATION/EDUCATION:   Patient receptive of education regarding MBS results, diet recommendations, aspiration risk, however continues to need reinforcement. her   demonstrated Fair understanding as evidenced by impaired cognition. The patients plan of care including findings from [facility], recommendations, planned interventions, and recommended diet changes were discussed with: Physician. Patient/family have participated as able in goal setting and plan of care.     Thank you for this referral.   Keli Snow M.S. CCC-SLP     Problem: Dysphagia (Adult)  Goal: *Acute Goals and Plan of Care (Insert Text)  Description: Speech Therapy Swallow Goals  Initiated 4/22/2022        -Patient will tolerate PO trials without clinical indicators of aspiration given moderate cues within 5-7day(s). -Patient will participate in modified barium swallow study within 5-7 day(s). [MET]      -Patient will demonstrate understanding of swallow safety precautions and aspiration precautions, diet recs with moderate cues within 5-7 day(s).     -Patient will participate in speech-language evaluation as indicated within 5-7 days    -Patient will tolerate Full, mildly thick trials w/o clinical indicators of aspiration and improved bolus oral clearance/management within 5-7 days  Outcome: Progressing Towards Goal

## 2022-04-25 NOTE — PROGRESS NOTES
Progress Note    Patient: Sammie Burgess MRN: [id number]  SSN: [id number]    YOB: 1945  Age: 78 y.o. Sex: female      Admit Date: 4/3/2022    LOS: 21 days     Subjective:   Patient followed for sepsis with now presumptive sacral wound infection secondary to E. Coli and Pseudomonas, now status post debridement. Still on Fluconazole for Candida UTI. Patient is awake and responsive today. No new complaints (complained of buttock pain last week). She remains afebrile with decreasing WBC and procal.  Daughter at the bedside. Pending disposition to SNF. Objective:     Vitals:    04/25/22 0800 04/25/22 1033 04/25/22 1200 04/25/22 1520   BP:  (!) 143/56  (!) 142/60   Pulse: 71 77 77 65   Resp:  16  16   Temp:  98.7 °F (37.1 °C)  99.3 °F (37.4 °C)   SpO2:  100%  100%   Weight:       Height:            Intake and Output:  Current Shift: 04/25 0701 - 04/25 1900  In: 240   Out: -   Last three shifts: 04/23 1901 - 04/25 0700  In: 300   Out: 2600 [Urine:2600]    Physical Exam:   Vitals and nursing note reviewed. Constitutional:       General: She is not in acute distress. Appearance: She is ill-appearing. HENT: eyes closed, Room Air   Cardiovascular:      Rate and Rhythm: Normal rate and regular rhythm. Heart sounds: No murmur heard. Pulmonary:      Effort: Pulmonary effort is normal.      Breath sounds: Normal breath sounds. Abdominal:      General: Bowel sounds are normal.      Palpations: Abdomen is soft. Tenderness: There is no abdominal tenderness. Comments: PEG tube in place, site unremarkable  Genitourinary:     Comments: Bell catheter with moderate urine sediment  Flexiseal with empty bag at this time  Musculoskeletal:      Right lower leg: No edema. Left lower leg: No edema. Comments: Left foot with gauze dressing, not removed at this time  Skin: Stage 3 sacral wound not visualized today     Findings: No rash.    Neurological:      Mental Status: She is alert. Comments: Unable to assess   Psychiatric:      Comments: Unable to assess      Lab/Data Review:     WBC 14,100  UA with WBC >100, budding yeasts    Procal 0.59 <1.47 < 2.91 <4.99 <5.34 <1.34 <1.20 <1.55 <2.01 <2.42 <6.93  CRP 6.30 <2.79 <4.95 < 8.28 < 7.05 <7.68 <7.99 <7.79    Blood cultures (4/3) No growth FINAL  Blood cultures (4/10)  No growth FINAL   Urine culture (4/7) >100,000 cfu/ml Pseudomonas aeruginosa   Urine culture (4/14) >100,000 cfu/ml mixed urogenital yasmine   Wound culture sacrum (4/20) E. Coli and Pseudomonas aeruginosa FINAL  Tissue culture sacrum (4/20) Coaugulase negative staphylococci consistent with colonization FINAL     CXR (4/11) No acute pulmonary process    Assessment:     Active Problems:    Hyperkalemia (4/4/2022)      UTI (urinary tract infection) (4/4/2022)      Sepsis ([facility] [address] (4/4/2022)      RICARDO (acute kidney injury) ([facility] [address] (4/4/2022)      AMS (altered mental status) (4/4/2022)    1. Sepsis with fever and leukocytosis, elevated procal and CRP, resolving  2. UTI with marked pyuria and bacteriuria, secondary to Pseudomonas aeruginosa, status post 14 days IV Zosyn  3. Altered mentals status, multifactorial including sepsis  4. Uncontrolled diabetes mellitus with hyperglycemia  5. Chronic maxillary sinusitis  6. ASCVD with prior stroke  7. Hepatitis C antibody positive, HCV RNA negative  8. 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 Coli and Pseudomonas aeruginosa, Day #5 IV Meropenem  10. Acute hypoxic respiratory failure, resolved  11. Diarrhea, presumed secondary to tube feeding  12. Candida UTI, presumptive, with marked pyuria and yeasts, Day #10 IV Fluconazole    Comment:  WBC and procal decreased but CRP increased today. Plan:   1. Reasonable to transition to oral Fluconazole 200 mg daily for 4 more days  2. Continue Meropenem IV for 2 more weeks  3. Cleared for discharge from ID standpoint. Signed By: Renea Rodriguez MD     April 25, 2022

## 2022-04-26 NOTE — PROGRESS NOTES
Problem: Diabetes Self-Management  Goal: *Disease process and treatment process  Description: Define diabetes and identify own type of diabetes; list 3 options for treating diabetes. Outcome: Progressing Towards Goal  Goal: *Incorporating nutritional management into lifestyle  Description: Describe effect of type, amount and timing of food on blood glucose; list 3 methods for planning meals. Outcome: Progressing Towards Goal  Goal: *Incorporating physical activity into lifestyle  Description: State effect of exercise on blood glucose levels. Outcome: Progressing Towards Goal  Goal: *Developing strategies to promote health/change behavior  Description: Define the ABC's of diabetes; identify appropriate screenings, schedule and personal plan for screenings. Outcome: Progressing Towards Goal  Goal: *Using medications safely  Description: State effect of diabetes medications on diabetes; name diabetes medication taking, action and side effects. Outcome: Progressing Towards Goal  Goal: *Monitoring blood glucose, interpreting and using results  Description: Identify recommended blood glucose targets  and personal targets. Outcome: Progressing Towards Goal  Goal: *Prevention, detection, treatment of acute complications  Description: List symptoms of hyper- and hypoglycemia; describe how to treat low blood sugar and actions for lowering  high blood glucose level. Outcome: Progressing Towards Goal  Goal: *Prevention, detection and treatment of chronic complications  Description: Define the natural course of diabetes and describe the relationship of blood glucose levels to long term complications of diabetes.   Outcome: Progressing Towards Goal  Goal: *Developing strategies to address psychosocial issues  Description: Describe feelings about living with diabetes; identify support needed and support network  Outcome: Progressing Towards Goal  Goal: *Insulin pump training  Outcome: Progressing Towards Goal  Goal: *Sick day guidelines  Outcome: Progressing Towards Goal  Goal: *Patient Specific Goal (EDIT GOAL, INSERT TEXT)  Outcome: Progressing Towards Goal     Problem: Patient Education: Go to Patient Education Activity  Goal: Patient/Family Education  Outcome: Progressing Towards Goal     Problem: Pressure Injury - Risk of  Goal: *Prevention of pressure injury  Description: Document Shakir Scale and appropriate interventions in the flowsheet. Outcome: Progressing Towards Goal  Note: Pressure Injury Interventions:  Sensory Interventions: Turn and reposition approx. every two hours (pillows and wedges if needed)    Moisture Interventions: Apply protective barrier, creams and emollients    Activity Interventions: Pressure redistribution bed/mattress(bed type)    Mobility Interventions: Turn and reposition approx. every two hours(pillow and wedges)    Nutrition Interventions: Document food/fluid/supplement intake    Friction and Shear Interventions: Apply protective barrier, creams and emollients                Problem: Patient Education: Go to Patient Education Activity  Goal: Patient/Family Education  Outcome: Progressing Towards Goal     Problem: Falls - Risk of  Goal: *Absence of Falls  Description: Document Maryann Fall Risk and appropriate interventions in the flowsheet.   Outcome: Progressing Towards Goal  Note: Fall Risk Interventions:  Mobility Interventions: Bed/chair exit alarm    Mentation Interventions: Bed/chair exit alarm    Medication Interventions: Bed/chair exit alarm    Elimination Interventions: Call light in reach              Problem: Patient Education: Go to Patient Education Activity  Goal: Patient/Family Education  Outcome: Progressing Towards Goal     Problem: Impaired Skin Integrity/Pressure Injury Treatment  Goal: *Improvement of Existing Pressure Injury  Outcome: Progressing Towards Goal  Goal: *Prevention of pressure injury  Description: Document Shakir Scale and appropriate interventions in the flowsheet.   Outcome: Progressing Towards Goal     Problem: Patient Education: Go to Patient Education Activity  Goal: Patient/Family Education  Outcome: Progressing Towards Goal     Problem: Patient Education: Go to Patient Education Activity  Goal: Patient/Family Education  Outcome: Progressing Towards Goal

## 2022-04-26 NOTE — PROGRESS NOTES
Progress Note    Patient: Stella Rose MRN: [id number]  SSN: [id number]    YOB: 1945  Age: 78 y.o. Sex: female      Admit Date: 4/3/2022    LOS: 22 days     Subjective:   Patient followed for sepsis with now presumptive sacral wound infection secondary to E. Coli and Pseudomonas, now status post debridement. Still on Fluconazole for Candida UTI. Patient is awake and responsive today. No new complaints (complained of buttock pain last week). She remains afebrile with decreasing WBC and procal.  Daughter at the bedside. Pending disposition to SNF. Objective:     Vitals:    04/25/22 2000 04/25/22 2028 04/26/22 0328 04/26/22 0745   BP:  (!) 152/63 (!) 144/59 (!) 126/59   Pulse: 79 79 76 78   Resp:  18 18 18   Temp:  99.2 °F (37.3 °C) 100.4 °F (38 °C) 99.3 °F (37.4 °C)   SpO2:  99% 97% 96%   Weight:       Height:            Intake and Output:  Current Shift: No intake/output data recorded. Last three shifts: 04/24 1901 - 04/26 0700  In: 2675   Out: 4700 [Urine:3500; Drains:1200]    Physical Exam:   Vitals and nursing note reviewed. Constitutional:       General: She is not in acute distress. Appearance: She is ill-appearing. HENT: eyes closed, Room Air   Cardiovascular:      Rate and Rhythm: Normal rate and regular rhythm. Heart sounds: No murmur heard. Pulmonary:      Effort: Pulmonary effort is normal.      Breath sounds: Normal breath sounds. Abdominal:      General: Bowel sounds are normal.      Palpations: Abdomen is soft. Tenderness: There is no abdominal tenderness. Comments: PEG tube in place, site unremarkable  Genitourinary:     Comments: Bell catheter with moderate urine sediment  Flexiseal with empty bag at this time  Musculoskeletal:      Right lower leg: No edema. Left lower leg: No edema. Comments: Left foot surgical site is dry  Skin: Stage 3 sacral wound with granulating base     Findings: No rash.    Neurological: awake and responsive  Psychiatric: normal behavior     Lab/Data Review:     WBC 14,100  UA with WBC >100, budding yeasts    Procal 0.56 <0.59 <1.47 < 2.91 <4.99 <5.34 <1.34 <1.20 <1.55 <2.01 <2.42 <6.93  CRP 10.10 <6.30 <2.79 <4.95 < 8.28 < 7.05 <7.68 <7.99 <7.79    Blood cultures (4/3) No growth FINAL  Blood cultures (4/10)  No growth FINAL   Urine culture (4/7) >100,000 cfu/ml Pseudomonas aeruginosa   Urine culture (4/14) >100,000 cfu/ml mixed urogenital yasmine   Wound culture sacrum (4/20) E. Coli and Pseudomonas aeruginosa FINAL  Tissue culture sacrum (4/20) Coaugulase negative staphylococci consistent with colonization FINAL   Left foot tissue (4/20) Coagulase negative Staphylococcus species FINAL    CXR (4/11) No acute pulmonary process    Assessment:     Active Problems:    Hyperkalemia (4/4/2022)      UTI (urinary tract infection) (4/4/2022)      Sepsis ([facility] [address] (4/4/2022)      RICARDO (acute kidney injury) ([facility] [address] (4/4/2022)      AMS (altered mental status) (4/4/2022)    1. Sepsis with fever and leukocytosis, elevated procal and CRP, resolving  2. UTI with marked pyuria and bacteriuria, secondary to Pseudomonas aeruginosa, status post 14 days IV Zosyn  3. Altered mentals status, multifactorial including sepsis  4. Uncontrolled diabetes mellitus with hyperglycemia  5. Chronic maxillary sinusitis  6. ASCVD with prior stroke  7. Hepatitis C antibody positive, HCV RNA negative  8. 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 Coli and Pseudomonas aeruginosa, Day #6 IV Meropenem  10. Acute hypoxic respiratory failure, resolved  11. Diarrhea, presumed secondary to tube feeding  12. Candida UTI, presumptive, with marked pyuria and yeasts, Day #11 IV Fluconazole    Comment:  WBC unchanged and CRP increasing. This is concerning. Urine sediment has decreased and left foot wound is dry. Plan:   1. Continue oral Fluconazole 200 mg daily for 3 more days  2. Continue Meropenem IV for 2 more weeks (pending Zosyn susceptibility for Pseudomonas isolate)  3. Cleared for discharge from ID standpoint.      Signed By: Wally Cavanaugh MD     April 26, 2022

## 2022-04-26 NOTE — PROGRESS NOTES
Of the Nursing Facilities CM sent referrals to within a 25 mile radius, two have accepted indicating they have a medicaid bed and can accommodate the patient's needs. CM contacted patient's daughter, Rusty Watson, at [phone] and informed her the two facilities are [facility] and [facility]. Patient's daughter stated she was not sure which she preferred as the distance might be an issue. CM explained that these were the closest facilities within a 25 mile radius that have accepted. She stated she wanted something closer. CM explained the only facilities we did not check with were [facility] and [place] and she declined referrals to those. Patient's daughter stated she still does not think the patient is medically stable with an \"active infection. \" CM explained she would be continuing IV abx on discharge but has been cleared by ID. Daughter became upset and asked what the next step was. CM explained we have accepting facilities and she has been cleared for discharge but she does have the right to appeal her discharge. She stated she is definitely going to appeal and requested CM take the IMM to her sister who is present in the patient's room. CM provided IMM to patient's sister and she signed it. They stated they will call to appeal. CM will await notification from [address] of appeal.     Medicare pt has received, reviewed, and signed 2nd IM letter informing them of their right to appeal the discharge. Signed copied has been placed on pt bedside chart. Addendum: CM received call back from patient's daughter, Rusty Watson, stating she called [phone] but had to leave a voice message. She also requested CM send a referral to [facility] and [facility]. Referrals have been sent.

## 2022-04-27 NOTE — PROGRESS NOTES
CM received notification from [facility] of appeal requested. All documents uploaded through the [employer] portal this morning. Case will now be in clinical review. CM will continue to follow.

## 2022-04-27 NOTE — PROGRESS NOTES
Progress Note    Patient: Jose Escobedo MRN: [id number]  SSN: [id number]    YOB: 1945  Age: 78 y.o. Sex: female      Admit Date: 4/3/2022    LOS: 23 days     Subjective:   Patient followed for sepsis with now presumptive sacral wound infection secondary to E. Coli and Pseudomonas, now status post debridement. Still on Fluconazole for Candida UTI. Patient is asleep today but appears to be resting comfortably. Family members at bedside. She is afebrile with decreasing WBC but stagnant procal and CRP. Pending disposition to SNF. Objective:     Vitals:    04/27/22 0104 04/27/22 0625 04/27/22 0626 04/27/22 0810   BP: 133/61 (!) 131/57 (!) 132/56 (!) 145/61   Pulse: 69 69 68 71   Resp:  19  18   Temp:  98.3 °F (36.8 °C)  98.4 °F (36.9 °C)   SpO2:  99%  99%   Weight:       Height:            Intake and Output:  Current Shift: No intake/output data recorded. Last three shifts: 04/25 1901 - 04/27 0700  In: 2985   Out: Kristine Childzen [IZDUI:3364; Drains:1200]    Physical Exam:   Vitals and nursing note reviewed. Constitutional:       General: She is not in acute distress. Appearance: She is ill-appearing. HENT: eyes closed, Room Air   Cardiovascular:      Rate and Rhythm: Normal rate and regular rhythm. Heart sounds: No murmur heard. Pulmonary:      Effort: Pulmonary effort is normal.      Breath sounds: Normal breath sounds. Abdominal:      General: Bowel sounds are normal.      Palpations: Abdomen is soft. Tenderness: There is no abdominal tenderness. Comments: PEG tube in place, site unremarkable  Genitourinary:     Comments: Bell catheter with moderate urine sediment  Flexiseal with empty bag at this time  Musculoskeletal:      Right lower leg: No edema. Left lower leg: No edema. Comments: Left foot surgical site is dry  Skin: Stage 3 sacral wound with granulating base     Findings: No rash.    Neurological: awake and responsive  Psychiatric: normal behavior Lab/Data Review:     WBC 12,100  UA with WBC >100, budding yeasts    Procal 0.80 <0.56 <0.59 <1.47 < 2.91 <4.99 <5.34 <1.34 <1.20 <1.55 <2.01 <2.42 <6.93  CRP 10.20 <10.10 <6.30 <2.79 <4.95 < 8.28 < 7.05 <7.68 <7.99 <7.79    Blood cultures (4/3) No growth FINAL  Blood cultures (4/10)  No growth FINAL   Urine culture (4/7) >100,000 cfu/ml Pseudomonas aeruginosa   Urine culture (4/14) >100,000 cfu/ml mixed urogenital yasmine   Wound culture sacrum (4/20) E. Coli and Pseudomonas aeruginosa FINAL  Tissue culture sacrum (4/20) Coaugulase negative staphylococci consistent with colonization FINAL   Left foot tissue (4/20) Coagulase negative Staphylococcus species FINAL    CXR (4/11) No acute pulmonary process    Assessment:     Active Problems:    Hyperkalemia (4/4/2022)      UTI (urinary tract infection) (4/4/2022)      Sepsis ([address] (4/4/2022)      RICARDO (acute kidney injury) ([facility] [address] (4/4/2022)      AMS (altered mental status) (4/4/2022)    1. Sepsis with fever and leukocytosis, elevated procal and CRP, resolving  2. UTI with marked pyuria and bacteriuria, secondary to Pseudomonas aeruginosa, status post 14 days IV Zosyn  3. Altered mentals status, multifactorial including sepsis  4. Uncontrolled diabetes mellitus with hyperglycemia  5. Chronic maxillary sinusitis  6. ASCVD with prior stroke  7. Hepatitis C antibody positive, HCV RNA negative  8. 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 Coli and Pseudomonas aeruginosa, Day #6 IV Meropenem  10. Acute hypoxic respiratory failure, resolved  11. Diarrhea, presumed secondary to tube feeding  12. Candida UTI, presumptive, with marked pyuria and yeasts, Day #12 IV Fluconazole    Comment:  WBC decreasing today but CRP and procal increasing slightly. Plan:   1. Continue oral Fluconazole 200 mg daily for 2 more days  2. Continue Meropenem IV for 2 more weeks    3. Cleared for discharge from ID standpoint. Signed By: Geoff Vasquez MD     April 27, 2022

## 2022-04-27 NOTE — PROGRESS NOTES
Problem: Pressure Injury - Risk of  Goal: *Prevention of pressure injury  Description: Document Shakir Scale and appropriate interventions in the flowsheet. Outcome: Progressing Towards Goal  Note: Pressure Injury Interventions:  Sensory Interventions: Assess changes in LOC,Float heels,Keep linens dry and wrinkle-free,Minimize linen layers,Pad between skin to skin,Pressure redistribution bed/mattress (bed type),Turn and reposition approx. every two hours (pillows and wedges if needed)    Moisture Interventions: Absorbent underpads,Apply protective barrier, creams and emollients,Check for incontinence Q2 hours and as needed,Internal/External fecal devices,Internal/External urinary devices,Minimize layers,Moisture barrier    Activity Interventions: Pressure redistribution bed/mattress(bed type)    Mobility Interventions: Turn and reposition approx.  every two hours(pillow and wedges),Pressure redistribution bed/mattress (bed type),HOB 30 degrees or less,Float heels    Nutrition Interventions: Document food/fluid/supplement intake,Discuss nutritional consult with provider    Friction and Shear Interventions: Apply protective barrier, creams and emollients,Feet elevated on foot rest,Foam dressings/transparent film/skin sealants,Lift team/patient mobility team,Minimize layers,Transferring/repositioning devices

## 2022-04-27 NOTE — DISCHARGE SUMMARY
Discharge Summary       PATIENT ID: Maddy Enriquez  MRN: [id number]   YOB: 1945    DATE OF ADMISSION: 4/3/2022 10:36 PM    DATE OF DISCHARGE:   PRIMARY CARE PROVIDER: Zaria Golden MD     ATTENDING PHYSICIAN: Fernando Soto  DISCHARGING PROVIDER: Fernando Soto      CONSULTATIONS: IP CONSULT TO NEPHROLOGY  IP CONSULT TO INFECTIOUS DISEASES  IP CONSULT TO NEPHROLOGY  IP CONSULT TO NEPHROLOGY  IP CONSULT TO GASTROENTEROLOGY  IP CONSULT TO GASTROENTEROLOGY  IP CONSULT TO PULMONOLOGY  IP CONSULT TO GENERAL SURGERY  IP CONSULT TO GENERAL SURGERY    PROCEDURES/SURGERIES: Procedure(s):  SACRAL DEBRIDEMENT    ADMITTING DIAGNOSES:    Patient Active Problem List    Diagnosis Date Noted    Hyperkalemia 04/04/2022    UTI (urinary tract infection) 04/04/2022    Sepsis ([address] 04/04/2022    RICARDO (acute kidney injury) ([address] 04/04/2022    AMS (altered mental status) 04/04/2022    CVA (cerebral vascular accident) ([address] 02/21/2022    Elevated troponin 02/21/2022    Ischemia of both lower extremities 07/22/2020    Pain in both lower legs 07/22/2020    Acute osteomyelitis of ankle or foot ([address] 04/04/2019    Diabetic peripheral neuropathy ([address] 04/04/2019    Spinal stenosis in cervical region 04/04/2019    Type 2 diabetes mellitus with nephropathy ([address] 01/31/2018    DM type 2 causing vascular disease ([address] 07/27/2017    Hypercalcemia 12/12/2016    PAD (peripheral artery disease) ([address] 04/11/2016    Infection of nailbed of toe of left foot 09/10/2015    Diabetes mellitus with neurological manifestations, uncontrolled 08/08/2013    HTN (hypertension)     Hyperlipidemia     Neuropathy     Sciatica        DISCHARGE DIAGNOSES / PLAN:      Severe sepsis with UTI  UTI/Pseudomonas  Acute on chronic kidney disease  Hyperkalemia  Hypernatremia, improving  CVA with right-sided weakness  Anemia of chronic disease  Gangrene of the left great toe  Sacral decubitus ulcer  Acute respiratory failure  Possible aspiration  Elevated BNP  PEG tube malfunction/seen by the GI now working   Gangrene of the left great toe-s/p removal of left great and second toe  Static post debridement of the sacral wound        DISCHARGE MEDICATIONS:  Current Discharge Medication List      START taking these medications    Details   albuterol-ipratropium (DUO-NEB) 2.5 mg-0.5 mg/3 ml nebu 3 mL by Nebulization route every six (6) hours as needed for Wheezing or Shortness of Breath. Qty: 30 Nebule, Refills: 1  Start date: 4/19/2022      hydrALAZINE (APRESOLINE) 50 mg tablet Take 1 Tablet by mouth three (3) times daily. Qty: 60 Tablet, Refills: 0  Start date: 4/19/2022      metoprolol tartrate (LOPRESSOR) 50 mg tablet Take 1 Tablet by mouth two (2) times a day. Qty: 60 Tablet, Refills: 0  Start date: 4/19/2022         CONTINUE these medications which have NOT CHANGED    Details   gabapentin (NEURONTIN) 300 mg capsule Take 1 Capsule by mouth two (2) times a day. Max Daily Amount: 600 mg. Qty: 12 Capsule, Refills: 0    Associated Diagnoses: Pain      insulin glargine (LANTUS) 100 unit/mL injection 50 Units by SubCUTAneous route daily. Qty: 10 mL, Refills: 0      acidophilus-pectin, citrus 25 million cell -100 mg tab tablet Take 2 Tablets by mouth daily. Qty: 30 Tablet, Refills: 0      amLODIPine (NORVASC) 5 mg tablet Take 1 Tablet by mouth daily. Qty: 30 Tablet, Refills: 0      artificial saliva (MOUTH KOTE) spra Take 1 Spray by mouth three (3) times daily. Qty: 10 mL, Refills: 0      latanoprost (XALATAN) 0.005 % ophthalmic solution Administer 1 Drop to right eye every evening. Qty: 10 mL, Refills: 0      atorvastatin (LIPITOR) 20 mg tablet TAKE 1 TABLET BY MOUTH EVERY NIGHT  Qty: 30 Tablet, Refills: 5    Associated Diagnoses: Type II or unspecified type diabetes mellitus with neurological manifestations, uncontrolled(250.62) ([address] Essential hypertension;  Hyperlipidemia, unspecified hyperlipidemia type; Vitamin D deficiency; Vitamin B12 deficiency; Neuropathy; Type 2 diabetes mellitus with hyperglycemia, with long-term current use of insulin ([address] Diabetes mellitus with neurological manifestations, uncontrolled; Mixed hyperlipidemia; PAD (peripheral artery disease) ([address] Hypercalcemia; Uncontrolled type 2 diabetes mellitus with hyperglycemia, with long-term current use of insulin (HCC)      brimonidine (ALPHAGAN) 0.2 % ophthalmic solution Administer 1 Drop to both eyes three (3) times daily. aspirin delayed-release 81 mg tablet Take  by mouth daily. ferrous sulfate 325 mg (65 mg iron) tablet Take 1 Tab by mouth two (2) times a day. Qty: 60 Tab, Refills: 5    Associated Diagnoses: Diabetes mellitus with neurological manifestations, uncontrolled; Essential hypertension; PAD (peripheral artery disease) ([address] Mixed hyperlipidemia; Type 2 diabetes mellitus with hyperglycemia, with long-term current use of insulin ([facility] Hypercalcemia; Uncontrolled type 2 diabetes mellitus with hyperglycemia, with long-term current use of insulin ([facility] Type II or unspecified type diabetes mellitus with neurological manifestations, uncontrolled(250.62) ([address] Hyperlipidemia, unspecified hyperlipidemia type; Vitamin D deficiency; Vitamin B12 deficiency; Neuropathy         STOP taking these medications       fluconazole (DIFLUCAN) 100 mg tablet Comments:   Reason for Stopping:         lidocaine (XYLOCAINE) 4 % (40 mg/mL) topical solution Comments:   Reason for Stopping:         metoprolol succinate (TOPROL-XL) 50 mg XL tablet Comments:   Reason for Stopping:                 NOTIFY YOUR PHYSICIAN FOR ANY OF THE FOLLOWING:   Fever over 101 degrees for 24 hours. Chest pain, shortness of breath, fever, chills, nausea, vomiting, diarrhea, change in mentation, falling, weakness, bleeding. Severe pain or pain not relieved by medications. Or, any other signs or symptoms that you may have questions about.     DISPOSITION:  x  Home With:   OT PT  HH  RN       Long term SNF/Inpatient Rehab    Independent/assisted living    Hospice    Other:       PATIENT CONDITION AT DISCHARGE: Stable      PHYSICAL EXAMINATION AT DISCHARGE:  General:          Alert, cooperative, no distress, appears stated age. HEENT:           Atraumatic, anicteric sclerae, pink conjunctivae                          No oral ulcers, mucosa moist, throat clear, dentition fair  Neck:               Supple, symmetrical  Lungs:             Clear to auscultation bilaterally. No Wheezing or Rhonchi. No rales. Chest wall:      No tenderness  No Accessory muscle use. Heart:              Regular  rhythm,  No  murmur   No edema  Abdomen:        Soft, non-tender. Not distended. Bowel sounds normal  Extremities:     No cyanosis. No clubbing,                            Skin turgor normal, Capillary refill normal  Skin:                Not pale. Not Jaundiced  No rashes   Psych:             Not anxious or agitated. Neurologic:      Alert, moves all extremities, answers questions appropriately and responds to commands     XR SWALLOW FUNC VIDEO   Final Result   For all attempts, a delayed oral phase was noted. Nectar consistency: Pooling in the vallecula with no penetration or aspiration   Thin liquid consistency: Residue in the vallecula. No penetration or aspiration   Honey consistency: Residue in the vallecula. No penetration or aspiration   Puree consistency: No penetration or aspiration. 1 minute 55 seconds of fluoroscopy   DAP: 229.1   14 mGy   Single fluoroscopic image stored on PACS      XR ABD (KUB)   Final Result   Percutaneous gastrostomy tube tip in the gastric antrum. XR CHEST PORT   Final Result   No acute pulmonary process. XR CHEST PORT   Final Result   No significant change. XR CHEST PORT   Final Result      XR CHEST PORT   Final Result   No acute cardiopulmonary abnormality. .       XR CHEST PORT   Final Result      CT HEAD WO CONT   Final Result   Chronic changes which appear stable. No acute or active intracranial process. Chronic paranasal sinus disease         XR CHEST PORT   Final Result   No acute findings. Recent Results (from the past 24 hour(s))   GLUCOSE, POC    Collection Time: 04/26/22  4:52 PM   Result Value Ref Range    Glucose (POC) 229 (H) 65 - 117 mg/dL    Performed by Saturnino Gerber    GLUCOSE, POC    Collection Time: 04/26/22 11:40 PM   Result Value Ref Range    Glucose (POC) 214 (H) 65 - 117 mg/dL    Performed by Melissa Freed    GLUCOSE, POC    Collection Time: 04/27/22  6:58 AM   Result Value Ref Range    Glucose (POC) 220 (H) 65 - 117 mg/dL    Performed by Melissa Freed    CBC WITH AUTOMATED DIFF    Collection Time: 04/27/22  7:00 AM   Result Value Ref Range    WBC 12.2 (H) 3.6 - 11.0 K/uL    RBC 3.41 (L) 3.80 - 5.20 M/uL    HGB 9.6 (L) 11.5 - 16.0 g/dL    HCT 30.6 (L) 35.0 - 47.0 %    MCV 89.7 80.0 - 99.0 FL    MCH 28.2 26.0 - 34.0 PG    MCHC 31.4 30.0 - 36.5 g/dL    RDW 16.4 (H) 11.5 - 14.5 %    PLATELET 069 428 - 545 K/uL    MPV 11.3 8.9 - 12.9 FL    NRBC 0.0 0.0  WBC    ABSOLUTE NRBC 0.00 0.00 - 0.01 K/uL    NEUTROPHILS 74 32 - 75 %    LYMPHOCYTES 17 12 - 49 %    MONOCYTES 5 5 - 13 %    EOSINOPHILS 2 0 - 7 %    BASOPHILS 1 0 - 1 %    IMMATURE GRANULOCYTES 1 (H) 0 - 0.5 %    ABS. NEUTROPHILS 9.0 (H) 1.8 - 8.0 K/UL    ABS. LYMPHOCYTES 2.1 0.8 - 3.5 K/UL    ABS. MONOCYTES 0.7 0.0 - 1.0 K/UL    ABS. EOSINOPHILS 0.2 0.0 - 0.4 K/UL    ABS. BASOPHILS 0.1 0.0 - 0.1 K/UL    ABS. IMM.  GRANS. 0.1 (H) 0.00 - 0.04 K/UL    DF AUTOMATED     C REACTIVE PROTEIN, QT    Collection Time: 04/27/22  7:00 AM   Result Value Ref Range    C-Reactive protein 10.20 (H) 0.00 - 0.60 mg/dL   PROCALCITONIN    Collection Time: 04/27/22  7:00 AM   Result Value Ref Range    Procalcitonin 0.80 (H) 0 ng/mL   METABOLIC PANEL, COMPREHENSIVE    Collection Time: 04/27/22  7:00 AM   Result Value Ref Range    Sodium 136 136 - 145 mmol/L    Potassium 5.3 (H) 3.5 - 5.1 mmol/L    Chloride 105 97 - 108 mmol/L    CO2 21 21 - 32 mmol/L    Anion gap 10 5 - 15 mmol/L    Glucose 207 (H) 65 - 100 mg/dL    BUN 29 (H) 6 - 20 mg/dL    Creatinine 0.80 0.55 - 1.02 mg/dL    BUN/Creatinine ratio 36 (H) 12 - 20      GFR est AA >60 >60 ml/min/1.73m2    GFR est non-AA >60 >60 ml/min/1.73m2    Calcium 9.1 8.5 - 10.1 mg/dL    Bilirubin, total 0.3 0.2 - 1.0 mg/dL    AST (SGOT) 23 15 - 37 U/L    ALT (SGPT) 36 12 - 78 U/L    Alk. phosphatase 98 45 - 117 U/L    Protein, total 6.4 6.4 - 8.2 g/dL    Albumin 1.9 (L) 3.5 - 5.0 g/dL    Globulin 4.5 (H) 2.0 - 4.0 g/dL    A-G Ratio 0.4 (L) 1.1 - 2.2     GLUCOSE, POC    Collection Time: 04/27/22  8:15 AM   Result Value Ref Range    Glucose (POC) 215 (H) 65 - 117 mg/dL    Performed by Zane Martinez:    HPI: Patient is a 69 y. o. year old female with signal past medical history of CVA with PEG tube left great toe gangrene, chronic kidney disease CVA with right-sided weakness hypertension type 2 diabetes bedridden open eyes do not follow any command was transferred to the hospital with fever and altered mental status as per SNF staff patient was awake and following simple command at the nursing home facility and since yesterday not able to follow any command confused transferred to the ER seen by the ER physician work-up shows acute kidney injury with hyperkalemia        Admitted for further evaluation and treat.     04/05   Patient is seen at bedside with daughter and nephew today. Patient is in distress due to pain and daughter notes that patient gets Neurontin TID for neuropathy daily. Otherwise, patient is still receiving enteric feeding through PEG tube and getting IVF.      Patient was seen by nephrology yesterday. Recommends obtaining renal panel and continuing IVF. Patient was seen by ID yesterday. Recommends continuing IV vancomycin for urosepsis.      Patient was seen by pulmonology today. Recommends PRBC transfusion.    Labs indicate WBC 16.9, Hgb 6.3, Na 148, Cl 120, BUN 72, Cr 1.71, Ca 8, CRP 28.4, pro-carrie 6.93 and .         04/06   Patient is seen at bedside. Patient received 2 units of PRBC. Patient is on Zosyn. No new changes today.      Patient seen by the vascular surgeon he recommend great toe amputation  And for sacral wound recommend Medihoney ointment      Labs show increasing Hgb of 9, decreasing WBC 14.7, Na 147, , BUN 55,   Cr 1.47, and CRP 21.4.     CXR is unremarkable.      04/07  Patient is seen resting at bedside. Patient received one unit of PRBC with increased Hgb of 10.6. No acute changes today.     Patient is seen by nephrology who recommends free water flushes to correct hypernatremia.      Labs show increasing Hgb of 10.6, WBC 15.1, pro-calcitonin 3.78, and CRP 16. 1.     4/8     Patient had a rapid response this morning ABG and chest x-ray was ordered  Patient tachycardic tachypneic  ABG  pH 7.51 PCO2 31 PO2 65 bicarb 26 oxygen saturation 95% on room air     Chest x-ray shows no much changes     4/9     Patient resting the bed open eyes  On room air/breathing through the mouth     4/10     Patient open eyes not in distress breathing through her mouth  According to the nurse patient not sleep all day and all night yesterday        4/11  Patient is seen at bedside. She is awake but not in any distress. Patient is on zosyn. PEG tube in place.      Urine culture positive for Pseudomonas.     CXR shows no focal changes. Labs remarkable for WBC  22.3, Hgb 10.5, Na 153, , Cr 1.55 BUN 94, pro-calcitonin 1.64, and CRP 3.33.     4/12  Patient is on Vancomycin, zosyn and D5W. PEG tube in place. Spoke to daughter at bedside. She has no new complaints. Labs remarkable for:  WBC  18.9,  Cr 1.33, BUN 84,  and CRP 2.33 which are decreasing. Hgb 11.2, Na-corrected 158, , and pro-calcitonin 1.93 which are increasing.      Seen by pulmonology. No new recommendations.     Seen by GI.  No new recommendations.      Seen by nephrology. recommends IV D5W IVF to decrease hypernatremia.     Seen by ID. Recommends discontinuing fluconazole, follow up blood cultures, and continuing IV zosyn and vancomycin.         4/13/2022  Patient is on Vancomycin, zosyn and D5W. PEG tube in place. Nephrology - recommends IV D5W IVF to decrease hypernatremia. Increase glargine to 25 units twice daily to correct hyperglycemia. Once the D5W is discontinued, the dose of glargine will have to be decreased     ID - Continue IV Zosyn and Vancomycin; discontinue Vancomycin if WBC continues to decrease     Pulmonology - No new recommendations.     GI - No new recommendations.     CXR 4/11/2022 - No acute pulmonary process (no focal changes)      Labs remarkable for:  Blood Glucose 380  WBC  19.8  Hgb 10.3   Na-corrected 154  BUN 69  Cr 1.22  CRP 3.14  pro-calcitonin 2.28      4/14  Patient's family members are present at bedside with the patient. They notes that the patient was in distress this morning. Patient still on vancomycin and zosyn. PEG tube in place and patient is receiving enteral feedings.      Seen by vascular surgeon who recommends great toe amputation.      Labs remarkable for Na-corrected 154 improving, Cr 1.19 improving, pro-carrie 2.42,  and CRP 7.65.     4/15  Patient is seen grunting and restless at bedside. Daughter is at bedside. She has no new complaints.      UA on 4/14 still positive for WBC>100, leukocyte esterase and grew yeast.  Labs remarkable for WBC 15.9 decreasing, Hb 9.5, Na-corrected 154, and CRP 7.99.     Patient seen by ID. Recommends continuing zosyn and discontinuing vancomycin.     4/18  Patient is seen at bedside. She is awake and alert x3. She is able to say a few words this morning. She has no new complaints. She is on zosyn and fluconazole. PEG tube is clogged.     Seen by vascular surgeon. Recommends great toe and second toe amputation. Awaiting for consent from daughter.    Labs remarkable for WBC 11.4 decreasing, Hb 9.8, Na 145, and pro-calcitonin 2.01.     4/19  Patient was talking this morning and has no new complaints today. Patient is still receiving fluconazole. PEG tube in place and working.      Labs remarkable for WBC 12.9, Hgb 9.1, and CRP 7.68. Seen by ID. Recommends transitioning to oral fluconazole 200 mg daily for 11 more days.        History of present illness patient 54 physical at the time of admission as a patient admitted with sepsis UTI acute on chronic kidney disease hyponatremia patient history of CVA history of gangrene of the left great toe sacral decubitus ulcers patient was seen by wound care also seen by the vascular surgeon recommend to continue monitor the gangrene of the foot no surgery at this time patient also seen by the wound care nurse regarding sacral decubitus ulcer no need of debridement at this time patient was treated for hypernatremia and acute kidney injury by the nephrologist which significant improvement    Seen by infectious disease treated with IV Zosyn now discharging back to nursing home on full p.o. fluconazole    Other GI as PEG tube came out not functioning well replaced yesterday patient tolerated the procedure well and tolerating the feeding today    Otherwise patient remained stable follow-up with vascular surgeon as an outpatient regarding gangrene of the foot    Change position every 2 hours at the nursing home for sacral decubitus ulcer keep the wounds clean      Medication reconciliation done time shopping 35 minutes 50% time spent counseling on coordination of    Discharged on fluconazole 200 mg daily for 9 days    Discussed with the patient daughter in detail she is upset about the wound care/wound vacuum not placed    Vascular surgeon recommend wound vacuum placed at the time of discharge    Patient daughter want to talk to vascular surgeon  regarding wound vacuum      Pending discharge to skilled care rehab  Waiting for placement    On oral fluconazole 200 mg for 2 more days  And IV meropenem for 2 more weeks    Cleared for discharge waiting for placement      Discussed with the patient daughter at the bedside    Signed:   Michael Garcia MD  4/27/2022  1:39 PM

## 2022-04-27 NOTE — PROGRESS NOTES
Attempted to see patient. Patient would not alert to verbal and tactile cues for p.o. trials. Due to fluctuating alertness, full, mildly thick diet as not been ordered at this time. SLP to continue to follow.

## 2022-04-27 NOTE — PROGRESS NOTES
SHIRLEY met with the patient's daughter, Estephanie Dougherty, at the bedside and provided her with the Detailed Notice of Discharge. SHIRLEY updated her on the appeal process as [facility] has received all medical records and they are reviewing it at this time. She stated they informed her they would have a decision in 1 to 3 days. SHIRLEY discussed facility placement and informed her [facility] and [address] both accepted in addition to the two we discussed yesterday,  of [facility] and [place]. She stated she is leaning towards [facility] but should have a final decision to SHIRLEY this afternoon. SHIRLEY will continue to follow.

## 2022-04-28 NOTE — PROGRESS NOTES
Scheduled night dose of hydralazine 50 mg held overnight for past two nights due to patient's blood pressure: See eMAR    Patient on specialty bed on q2hr turns overnight. Patient receiving tube feeding as ordered and wound care completed. Continue care regimen.

## 2022-04-28 NOTE — PROGRESS NOTES
Problem: Pressure Injury - Risk of  Goal: *Prevention of pressure injury  Description: Document Shakir Scale and appropriate interventions in the flowsheet. Outcome: Progressing Towards Goal  Note: Pressure Injury Interventions:  Sensory Interventions: Assess changes in LOC    Moisture Interventions: Apply protective barrier, creams and emollients,Internal/External fecal devices,Internal/External urinary devices    Activity Interventions: Pressure redistribution bed/mattress(bed type)    Mobility Interventions: Suspension boots,Turn and reposition approx.  every two hours(pillow and wedges)    Nutrition Interventions: Document food/fluid/supplement intake    Friction and Shear Interventions: HOB 30 degrees or less,Minimize layers,Foam dressings/transparent film/skin sealants,Apply protective barrier, creams and emollients

## 2022-04-28 NOTE — DISCHARGE SUMMARY
Discharge Summary       PATIENT ID: Navya Shrestha  MRN: [id number]   YOB: 1945    DATE OF ADMISSION: 4/3/2022 10:36 PM    DATE OF DISCHARGE:   PRIMARY CARE PROVIDER: Seven Blood MD     ATTENDING PHYSICIAN: Laban Mcardle Mohiuddin  DISCHARGING PROVIDER: Laban Mcardle Mohiuddin      CONSULTATIONS: IP CONSULT TO NEPHROLOGY  IP CONSULT TO INFECTIOUS DISEASES  IP CONSULT TO NEPHROLOGY  IP CONSULT TO NEPHROLOGY  IP CONSULT TO GASTROENTEROLOGY  IP CONSULT TO GASTROENTEROLOGY  IP CONSULT TO PULMONOLOGY  IP CONSULT TO GENERAL SURGERY  IP CONSULT TO GENERAL SURGERY    PROCEDURES/SURGERIES: Procedure(s):  SACRAL DEBRIDEMENT    ADMITTING DIAGNOSES:    Patient Active Problem List    Diagnosis Date Noted    Hyperkalemia 04/04/2022    UTI (urinary tract infection) 04/04/2022    Sepsis ([address] 04/04/2022    RICARDO (acute kidney injury) ([address] 04/04/2022    AMS (altered mental status) 04/04/2022    CVA (cerebral vascular accident) ([address] 02/21/2022    Elevated troponin 02/21/2022    Ischemia of both lower extremities 07/22/2020    Pain in both lower legs 07/22/2020    Acute osteomyelitis of ankle or foot ([address] 04/04/2019    Diabetic peripheral neuropathy ([address] 04/04/2019    Spinal stenosis in cervical region 04/04/2019    Type 2 diabetes mellitus with nephropathy ([address] 01/31/2018    DM type 2 causing vascular disease ([address] 07/27/2017    Hypercalcemia 12/12/2016    PAD (peripheral artery disease) ([address] 04/11/2016    Infection of nailbed of toe of left foot 09/10/2015    Diabetes mellitus with neurological manifestations, uncontrolled 08/08/2013    HTN (hypertension)     Hyperlipidemia     Neuropathy     Sciatica        DISCHARGE DIAGNOSES / PLAN:      Severe sepsis with UTI  UTI/Pseudomonas  Acute on chronic kidney disease  Hyperkalemia  Hypernatremia, improving  CVA with right-sided weakness  Anemia of chronic disease  Gangrene of the left great toe  Sacral decubitus ulcer  Acute respiratory failure  Possible aspiration  Elevated BNP  PEG tube malfunction/seen by the GI now working   Gangrene of the left great toe-s/p removal of left great and second toe  Static post debridement of the sacral wound        DISCHARGE MEDICATIONS:  Current Discharge Medication List      START taking these medications    Details   albuterol-ipratropium (DUO-NEB) 2.5 mg-0.5 mg/3 ml nebu 3 mL by Nebulization route every six (6) hours as needed for Wheezing or Shortness of Breath. Qty: 30 Nebule, Refills: 1  Start date: 4/19/2022      hydrALAZINE (APRESOLINE) 50 mg tablet Take 1 Tablet by mouth three (3) times daily. Qty: 60 Tablet, Refills: 0  Start date: 4/19/2022      metoprolol tartrate (LOPRESSOR) 50 mg tablet Take 1 Tablet by mouth two (2) times a day. Qty: 60 Tablet, Refills: 0  Start date: 4/19/2022         CONTINUE these medications which have NOT CHANGED    Details   gabapentin (NEURONTIN) 300 mg capsule Take 1 Capsule by mouth two (2) times a day. Max Daily Amount: 600 mg. Qty: 12 Capsule, Refills: 0    Associated Diagnoses: Pain      insulin glargine (LANTUS) 100 unit/mL injection 50 Units by SubCUTAneous route daily. Qty: 10 mL, Refills: 0      acidophilus-pectin, citrus 25 million cell -100 mg tab tablet Take 2 Tablets by mouth daily. Qty: 30 Tablet, Refills: 0      amLODIPine (NORVASC) 5 mg tablet Take 1 Tablet by mouth daily. Qty: 30 Tablet, Refills: 0      artificial saliva (MOUTH KOTE) spra Take 1 Spray by mouth three (3) times daily. Qty: 10 mL, Refills: 0      latanoprost (XALATAN) 0.005 % ophthalmic solution Administer 1 Drop to right eye every evening. Qty: 10 mL, Refills: 0      atorvastatin (LIPITOR) 20 mg tablet TAKE 1 TABLET BY MOUTH EVERY NIGHT  Qty: 30 Tablet, Refills: 5    Associated Diagnoses: Type II or unspecified type diabetes mellitus with neurological manifestations, uncontrolled(250.62) ([address] Essential hypertension;  Hyperlipidemia, unspecified hyperlipidemia type; Vitamin D deficiency; Vitamin B12 deficiency; Neuropathy; Type 2 diabetes mellitus with hyperglycemia, with long-term current use of insulin ([address] Diabetes mellitus with neurological manifestations, uncontrolled; Mixed hyperlipidemia; PAD (peripheral artery disease) ([address] Hypercalcemia; Uncontrolled type 2 diabetes mellitus with hyperglycemia, with long-term current use of insulin (HCC)      brimonidine (ALPHAGAN) 0.2 % ophthalmic solution Administer 1 Drop to both eyes three (3) times daily. aspirin delayed-release 81 mg tablet Take  by mouth daily. ferrous sulfate 325 mg (65 mg iron) tablet Take 1 Tab by mouth two (2) times a day. Qty: 60 Tab, Refills: 5    Associated Diagnoses: Diabetes mellitus with neurological manifestations, uncontrolled; Essential hypertension; PAD (peripheral artery disease) ([address] Mixed hyperlipidemia; Type 2 diabetes mellitus with hyperglycemia, with long-term current use of insulin ([facility] Hypercalcemia; Uncontrolled type 2 diabetes mellitus with hyperglycemia, with long-term current use of insulin ([facility] Type II or unspecified type diabetes mellitus with neurological manifestations, uncontrolled(250.62) ([address] Hyperlipidemia, unspecified hyperlipidemia type; Vitamin D deficiency; Vitamin B12 deficiency; Neuropathy         STOP taking these medications       fluconazole (DIFLUCAN) 100 mg tablet Comments:   Reason for Stopping:         lidocaine (XYLOCAINE) 4 % (40 mg/mL) topical solution Comments:   Reason for Stopping:         metoprolol succinate (TOPROL-XL) 50 mg XL tablet Comments:   Reason for Stopping:                 NOTIFY YOUR PHYSICIAN FOR ANY OF THE FOLLOWING:   Fever over 101 degrees for 24 hours. Chest pain, shortness of breath, fever, chills, nausea, vomiting, diarrhea, change in mentation, falling, weakness, bleeding. Severe pain or pain not relieved by medications. Or, any other signs or symptoms that you may have questions about.     DISPOSITION:  x  Home With:   OT PT  HH  RN       Long term SNF/Inpatient Rehab    Independent/assisted living    Hospice    Other:       PATIENT CONDITION AT DISCHARGE: Stable      PHYSICAL EXAMINATION AT DISCHARGE:  General:          Alert, cooperative, no distress, appears stated age. HEENT:           Atraumatic, anicteric sclerae, pink conjunctivae                          No oral ulcers, mucosa moist, throat clear, dentition fair  Neck:               Supple, symmetrical  Lungs:             Clear to auscultation bilaterally. No Wheezing or Rhonchi. No rales. Chest wall:      No tenderness  No Accessory muscle use. Heart:              Regular  rhythm,  No  murmur   No edema  Abdomen:        Soft, non-tender. Not distended. Bowel sounds normal  Extremities:     No cyanosis. No clubbing,                            Skin turgor normal, Capillary refill normal  Skin:                Not pale. Not Jaundiced  No rashes   Psych:             Not anxious or agitated. Neurologic:      Alert, moves all extremities, answers questions appropriately and responds to commands     XR SWALLOW FUNC VIDEO   Final Result   For all attempts, a delayed oral phase was noted. Nectar consistency: Pooling in the vallecula with no penetration or aspiration   Thin liquid consistency: Residue in the vallecula. No penetration or aspiration   Honey consistency: Residue in the vallecula. No penetration or aspiration   Puree consistency: No penetration or aspiration. 1 minute 55 seconds of fluoroscopy   DAP: 229.1   14 mGy   Single fluoroscopic image stored on PACS      XR ABD (KUB)   Final Result   Percutaneous gastrostomy tube tip in the gastric antrum. XR CHEST PORT   Final Result   No acute pulmonary process. XR CHEST PORT   Final Result   No significant change. XR CHEST PORT   Final Result      XR CHEST PORT   Final Result   No acute cardiopulmonary abnormality. .       XR CHEST PORT   Final Result      CT HEAD WO CONT   Final Result   Chronic changes which appear stable. No acute or active intracranial process. Chronic paranasal sinus disease         XR CHEST PORT   Final Result   No acute findings. Recent Results (from the past 24 hour(s))   GLUCOSE, POC    Collection Time: 04/27/22  5:05 PM   Result Value Ref Range    Glucose (POC) 214 (H) 65 - 117 mg/dL    Performed by [id number] Camilel Alicia, POC    Collection Time: 04/27/22 11:15 PM   Result Value Ref Range    Glucose (POC) 162 (H) 65 - 117 mg/dL    Performed by Daja Lowry    GLUCOSE, POC    Collection Time: 04/28/22  5:35 AM   Result Value Ref Range    Glucose (POC) 194 (H) 65 - 117 mg/dL    Performed by Daja Lowry    CBC WITH AUTOMATED DIFF    Collection Time: 04/28/22  6:44 AM   Result Value Ref Range    WBC 11.8 (H) 3.6 - 11.0 K/uL    RBC 3.43 (L) 3.80 - 5.20 M/uL    HGB 9.8 (L) 11.5 - 16.0 g/dL    HCT 30.2 (L) 35.0 - 47.0 %    MCV 88.0 80.0 - 99.0 FL    MCH 28.6 26.0 - 34.0 PG    MCHC 32.5 30.0 - 36.5 g/dL    RDW 16.3 (H) 11.5 - 14.5 %    PLATELET 513 743 - 434 K/uL    MPV 11.1 8.9 - 12.9 FL    NRBC 0.0 0.0  WBC    ABSOLUTE NRBC 0.00 0.00 - 0.01 K/uL    NEUTROPHILS 68 32 - 75 %    LYMPHOCYTES 21 12 - 49 %    MONOCYTES 7 5 - 13 %    EOSINOPHILS 2 0 - 7 %    BASOPHILS 1 0 - 1 %    IMMATURE GRANULOCYTES 1 (H) 0 - 0.5 %    ABS. NEUTROPHILS 8.2 (H) 1.8 - 8.0 K/UL    ABS. LYMPHOCYTES 2.4 0.8 - 3.5 K/UL    ABS. MONOCYTES 0.8 0.0 - 1.0 K/UL    ABS. EOSINOPHILS 0.2 0.0 - 0.4 K/UL    ABS. BASOPHILS 0.1 0.0 - 0.1 K/UL    ABS. IMM. GRANS. 0.1 (H) 0.00 - 0.04 K/UL    DF AUTOMATED     C REACTIVE PROTEIN, QT    Collection Time: 04/28/22  6:44 AM   Result Value Ref Range    C-Reactive protein 11.20 (H) 0.00 - 0.60 mg/dL   GLUCOSE, POC    Collection Time: 04/28/22 11:44 AM   Result Value Ref Range    Glucose (POC) 187 (H) 65 - 117 mg/dL    Performed by [employer]:    HPI: Patient is a 69 y. o. year old female with signal past medical history of CVA with PEG tube left great toe gangrene, chronic kidney disease CVA with right-sided weakness hypertension type 2 diabetes bedridden open eyes do not follow any command was transferred to the hospital with fever and altered mental status as per SNF staff patient was awake and following simple command at the nursing home facility and since yesterday not able to follow any command confused transferred to the ER seen by the ER physician work-up shows acute kidney injury with hyperkalemia        Admitted for further evaluation and treat.     04/05   Patient is seen at bedside with daughter and nephew today. Patient is in distress due to pain and daughter notes that patient gets Neurontin TID for neuropathy daily. Otherwise, patient is still receiving enteric feeding through PEG tube and getting IVF.      Patient was seen by nephrology yesterday. Recommends obtaining renal panel and continuing IVF. Patient was seen by ID yesterday. Recommends continuing IV vancomycin for urosepsis.      Patient was seen by pulmonology today. Recommends PRBC transfusion.      Labs indicate WBC 16.9, Hgb 6.3, Na 148, Cl 120, BUN 72, Cr 1.71, Ca 8, CRP 28.4, pro-carrie 6.93 and .         04/06   Patient is seen at bedside. Patient received 2 units of PRBC. Patient is on Zosyn. No new changes today.      Patient seen by the vascular surgeon he recommend great toe amputation  And for sacral wound recommend Medihoney ointment      Labs show increasing Hgb of 9, decreasing WBC 14.7, Na 147, , BUN 55,   Cr 1.47, and CRP 21.4.     CXR is unremarkable.      04/07  Patient is seen resting at bedside. Patient received one unit of PRBC with increased Hgb of 10.6. No acute changes today.     Patient is seen by nephrology who recommends free water flushes to correct hypernatremia.      Labs show increasing Hgb of 10.6, WBC 15.1, pro-calcitonin 3.78, and CRP 16. 1.     4/8     Patient had a rapid response this morning ABG and chest x-ray was ordered  Patient tachycardic tachypneic  ABG  pH 7.51 PCO2 31 PO2 65 bicarb 26 oxygen saturation 95% on room air     Chest x-ray shows no much changes     4/9     Patient resting the bed open eyes  On room air/breathing through the mouth     4/10     Patient open eyes not in distress breathing through her mouth  According to the nurse patient not sleep all day and all night yesterday        4/11  Patient is seen at bedside. She is awake but not in any distress. Patient is on zosyn. PEG tube in place.      Urine culture positive for Pseudomonas.     CXR shows no focal changes. Labs remarkable for WBC  22.3, Hgb 10.5, Na 153, , Cr 1.55 BUN 94, pro-calcitonin 1.64, and CRP 3.33.     4/12  Patient is on Vancomycin, zosyn and D5W. PEG tube in place. Spoke to daughter at bedside. She has no new complaints. Labs remarkable for:  WBC  18.9,  Cr 1.33, BUN 84,  and CRP 2.33 which are decreasing. Hgb 11.2, Na-corrected 158, , and pro-calcitonin 1.93 which are increasing.      Seen by pulmonology. No new recommendations.     Seen by GI. No new recommendations.      Seen by nephrology. recommends IV D5W IVF to decrease hypernatremia.     Seen by ID. Recommends discontinuing fluconazole, follow up blood cultures, and continuing IV zosyn and vancomycin.         4/13/2022  Patient is on Vancomycin, zosyn and D5W. PEG tube in place. Nephrology - recommends IV D5W IVF to decrease hypernatremia. Increase glargine to 25 units twice daily to correct hyperglycemia.  Once the D5W is discontinued, the dose of glargine will have to be decreased     ID - Continue IV Zosyn and Vancomycin; discontinue Vancomycin if WBC continues to decrease     Pulmonology - No new recommendations.     GI - No new recommendations.     CXR 4/11/2022 - No acute pulmonary process (no focal changes)      Labs remarkable for:  Blood Glucose 380  WBC  19.8  Hgb 10.3   Na-corrected 154  BUN 69  Cr 1.22  CRP 3.14  pro-calcitonin 2.28    4/14  Patient's family members are present at bedside with the patient. They notes that the patient was in distress this morning. Patient still on vancomycin and zosyn. PEG tube in place and patient is receiving enteral feedings.      Seen by vascular surgeon who recommends great toe amputation.      Labs remarkable for Na-corrected 154 improving, Cr 1.19 improving, pro-carrie 2.42,  and CRP 7.65.     4/15  Patient is seen grunting and restless at bedside. Daughter is at bedside. She has no new complaints.      UA on 4/14 still positive for WBC>100, leukocyte esterase and grew yeast.  Labs remarkable for WBC 15.9 decreasing, Hb 9.5, Na-corrected 154, and CRP 7.99.     Patient seen by ID. Recommends continuing zosyn and discontinuing vancomycin.     4/18  Patient is seen at bedside. She is awake and alert x3. She is able to say a few words this morning. She has no new complaints. She is on zosyn and fluconazole. PEG tube is clogged.     Seen by vascular surgeon. Recommends great toe and second toe amputation. Awaiting for consent from daughter.      Labs remarkable for WBC 11.4 decreasing, Hb 9.8, Na 145, and pro-calcitonin 2.01.     4/19  Patient was talking this morning and has no new complaints today. Patient is still receiving fluconazole. PEG tube in place and working.      Labs remarkable for WBC 12.9, Hgb 9.1, and CRP 7.68. Seen by ID. Recommends transitioning to oral fluconazole 200 mg daily for 11 more days.        History of present illness patient 54 physical at the time of admission as a patient admitted with sepsis UTI acute on chronic kidney disease hyponatremia patient history of CVA history of gangrene of the left great toe sacral decubitus ulcers patient was seen by wound care also seen by the vascular surgeon recommend to continue monitor the gangrene of the foot no surgery at this time patient also seen by the wound care nurse regarding sacral decubitus ulcer no need of debridement at this time patient was treated for hypernatremia and acute kidney injury by the nephrologist which significant improvement    Seen by infectious disease treated with IV Zosyn now discharging back to nursing home on full p.o. fluconazole    Other GI as PEG tube came out not functioning well replaced yesterday patient tolerated the procedure well and tolerating the feeding today    Otherwise patient remained stable follow-up with vascular surgeon as an outpatient regarding gangrene of the foot    Change position every 2 hours at the nursing home for sacral decubitus ulcer keep the wounds clean      Medication reconciliation done time shopping 35 minutes 50% time spent counseling on coordination of    Discharged on fluconazole 200 mg daily for 9 days    Discussed with the patient daughter in detail she is upset about the wound care/wound vacuum not placed    Vascular surgeon recommend wound vacuum placed at the time of discharge    Patient daughter want to talk to vascular surgeon  regarding wound vacuum      Pending discharge to skilled care rehab  Waiting for placement    On oral fluconazole 200 mg for  1 more days  And IV meropenem for 2 more weeks  Ordered PICC line today    Cleared for discharge waiting for placement      Discussed with the patient daughter at the bedside    Signed:   Melanie Ramirez MD  4/28/2022  1:39 PM

## 2022-04-28 NOTE — PROGRESS NOTES
CM received call from [facility] regarding patient's discharge appeal. They informed CM that they agreed with the hospital that the patient was medically stable for discharge but reported patient's daughter opted for a second level appeal. They initiated the second level appeal and stated they would be faxing CM paperwork. Typically we receive paperwork via fax indicating the above information and providing a date for when the patient's financial liability starts. CM has not yet received this paperwork which may be do to the second level appeal request. CM will reach back out to [facility] for clarification. CM followed up with patient's daughter, Bruce Padron, at [phone] and she confirmed the above information regarding the appeal. She informed CM she does prefer [address] and is agreeable for her to go to that facility once she hears back from the second level appeal. CM has notified [facility] and requested an air mattress. CM also was informed patient will need a two wound vacs on discharge, one for her foot, and one for her sacral wound. There are no orders in the chart for the wound vac and when that would need to be started at the SNF. CM has attempted to reach out to Dr. Billy Worrell for orders and clarification. Also discussed with wound care nurse. Also it is noted in Dr. Sarah Maxwell note that the patient will need 2 more weeks of IV Merrem. She will need a PICC line for this. Nurse and attending made aware. At this time CM is awaiting determination from [facility] on second level appeal and if the patient remains in the hospital until that is determined. Also, awaiting orders for wound vac to send to [address].

## 2022-04-28 NOTE — PROGRESS NOTES
Progress Note    Patient: Malinda Rosado MRN: [id number]  SSN: [id number]    YOB: 1945  Age: 78 y.o. Sex: female      Admit Date: 4/3/2022    LOS: 24 days     Subjective:   Patient followed for sepsis with now presumptive sacral wound infection secondary to E. Coli and Pseudomonas, now status post debridement. Still on Fluconazole for Candida UTI. Patient is asleep today but appears to be resting comfortably. Family members at bedside. She is afebrile with decreasing WBC but stagnant procal and CRP. Pending disposition to SNF. Objective:     Vitals:    04/27/22 2355 04/28/22 0040 04/28/22 0044 04/28/22 0740   BP: (!) 116/52 (!) 116/52 (!) 120/52 (!) 144/59   Pulse: 71 80  71   Resp:    19   Temp:    99.2 °F (37.3 °C)   SpO2:    99%   Weight:       Height:            Intake and Output:  Current Shift: No intake/output data recorded. Last three shifts: 04/26 1901 - 04/28 0700  In: 1200   Out: 3199 [Urine:3199]    Physical Exam:   Vitals and nursing note reviewed. Constitutional:       General: She is not in acute distress. Appearance: She is ill-appearing. HENT: eyes closed, Room Air   Cardiovascular:      Rate and Rhythm: Normal rate and regular rhythm. Heart sounds: No murmur heard. Pulmonary:      Effort: Pulmonary effort is normal.      Breath sounds: Normal breath sounds. Abdominal:      General: Bowel sounds are normal.      Palpations: Abdomen is soft. Tenderness: There is no abdominal tenderness. Comments: PEG tube in place, site unremarkable  Genitourinary:     Comments: Bell catheter with moderate urine sediment  Flexiseal with empty bag at this time  Musculoskeletal:      Right lower leg: No edema. Left lower leg: No edema. Comments: Left foot surgical site is dry  Skin: Stage 3 sacral wound with granulating base     Findings: No rash.    Neurological: awake and responsive  Psychiatric: normal behavior     Lab/Data Review:     WBC 11,800  UA with WBC >100, budding yeasts    Procal 0.80 <0.56 <0.59 <1.47 < 2.91 <4.99 <5.34   CRP 11.20 <10.20 <10.10     Blood cultures (4/3) No growth FINAL  Blood cultures (4/10)  No growth FINAL   Urine culture (4/7) >100,000 cfu/ml Pseudomonas aeruginosa   Urine culture (4/14) >100,000 cfu/ml mixed urogenital yasmine   Wound culture sacrum (4/20) E. Coli and Pseudomonas aeruginosa FINAL  Tissue culture sacrum (4/20) Coaugulase negative staphylococci consistent with colonization FINAL   Left foot tissue (4/20) Coagulase negative Staphylococcus species FINAL    CXR (4/11) No acute pulmonary process    Assessment:     Active Problems:    Hyperkalemia (4/4/2022)      UTI (urinary tract infection) (4/4/2022)      Sepsis ([facility] (4/4/2022)      RICARDO (acute kidney injury) ([facility] (4/4/2022)      AMS (altered mental status) (4/4/2022)    1. Sepsis with fever and leukocytosis, elevated procal and CRP, resolving  2. UTI with marked pyuria and bacteriuria, secondary to Pseudomonas aeruginosa, status post 14 days IV Zosyn   3. Altered mentals status, multifactorial including sepsis  4. Uncontrolled diabetes mellitus with hyperglycemia  5. Chronic maxillary sinusitis  6. ASCVD with prior stroke  7. Hepatitis C antibody positive, HCV RNA negative  8. 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 Coli and Pseudomonas aeruginosa, Day #7 IV Meropenem  10. Acute hypoxic respiratory failure, resolved  11. Diarrhea, presumed secondary to tube feeding  12. Candida UTI, presumptive, with marked pyuria and yeasts, Day #13 Fluconazole    Comment:  WBC now normal but CRP remains elevated and remains concerning. Plan:   1. Continue oral Fluconazole 200 mg daily for 1 more day  2. Continue Meropenem IV for 2 more weeks    3. Cleared for discharge from ID standpoint.      Signed By: Merline Cue, MD     April 28, 2022

## 2022-04-28 NOTE — PROGRESS NOTES
Attempted dysphagia tx. Patient sleeping upon arrival, per daughter line was just placed and mother is sleeping. Will re-attempt on 4/29/22.

## 2022-04-28 NOTE — WOUND CARE
Received notice from Marina Chaudhary that the daughter Sonia Castle requested I call her to discuss wound care for patient when discharged. I spoke to Sonia Castle at [phone] via telephone and her main concern was how the wound vac dressing to the sacrum would be kept clean from fecal contamination. I informed Maryse that applying a Optifoam dressing over inferior aspect of wound vac dressing works well to keep fecal contamination from reaching the film. The foam dressing can be wiped clean and left in place if feces does not get underneath the foam dressing. Maryse thanked me for calling her and did not have any other questions at this time.

## 2022-04-29 NOTE — PROGRESS NOTES
Progress Note    Patient: Cedric Stevens MRN: [id number]  SSN: [id number]    YOB: 1945  Age: 78 y.o. Sex: female      Admit Date: 4/3/2022    LOS: 25 days     Subjective:   Patient followed for sepsis with now presumptive sacral wound infection secondary to E. Coli and Pseudomonas, now status post debridement. Still on Fluconazole for Candida UTI. Patient is awake today with no complaints. Family members at bedside. She is afebrile with increasing WBC and CRP. Pending disposition to SNF. Objective:     Vitals:    04/28/22 1450 04/28/22 1935 04/29/22 0147 04/29/22 0732   BP: 137/60 (!) 148/64 137/68 (!) 152/53   Pulse: 80 89 76 81   Resp: 18 16 18 18   Temp: 99.7 °F (37.6 °C) 97.9 °F (36.6 °C) 98.2 °F (36.8 °C) 99 °F (37.2 °C)   SpO2: 97% 99% 99% 97%   Weight:       Height:            Intake and Output:  Current Shift: No intake/output data recorded. Last three shifts: 04/27 1901 - 04/29 0700  In: 400   Out: 775 [Urine:775]    Physical Exam:   Vitals and nursing note reviewed. Constitutional:       General: She is not in acute distress. Appearance: She is ill-appearing. HENT: eyes closed, Room Air   Cardiovascular:      Rate and Rhythm: Normal rate and regular rhythm. Heart sounds: No murmur heard. Pulmonary:      Effort: Pulmonary effort is normal.      Breath sounds: Normal breath sounds. Abdominal:      General: Bowel sounds are normal.      Palpations: Abdomen is soft. Tenderness: There is no abdominal tenderness. Comments: PEG tube in place, site unremarkable  Genitourinary:     Comments: Bell catheter with moderate urine sediment  Flexiseal with empty bag at this time  Musculoskeletal:      Right lower leg: No edema. Left lower leg: No edema. Comments: Left foot surgical site is dry  Skin: Stage 3 sacral wound with granulating base     Findings: No rash.    Neurological: awake and responsive  Psychiatric: normal behavior     Lab/Data Review: WBC 12,200  UA with WBC >100, budding yeasts    Procal 0.77 <0.80 <0.56 <0.59 <1.47 < 2.91 <4.99 <5.34   CRP 13.20 <11.20 <10.20 <10.10     Blood cultures (4/3) No growth FINAL  Blood cultures (4/10)  No growth FINAL   Urine culture (4/7) >100,000 cfu/ml Pseudomonas aeruginosa   Urine culture (4/14) >100,000 cfu/ml mixed urogenital yasmine   Wound culture sacrum (4/20) E. Coli and Pseudomonas aeruginosa FINAL  Tissue culture sacrum (4/20) Coaugulase negative staphylococci consistent with colonization FINAL   Left foot tissue (4/20) Coagulase negative Staphylococcus species FINAL    CXR (4/11) No acute pulmonary process    Assessment:     Active Problems:    Hyperkalemia (4/4/2022)      UTI (urinary tract infection) (4/4/2022)      Sepsis ([address] (4/4/2022)      RICARDO (acute kidney injury) ([facility] [address] (4/4/2022)      AMS (altered mental status) (4/4/2022)    1. Sepsis with fever and leukocytosis, elevated procal and CRP, resolving  2. UTI with marked pyuria and bacteriuria, secondary to Pseudomonas aeruginosa, status post 14 days IV Zosyn   3. Altered mentals status, multifactorial including sepsis  4. Uncontrolled diabetes mellitus with hyperglycemia  5. Chronic maxillary sinusitis  6. ASCVD with prior stroke  7. Hepatitis C antibody positive, HCV RNA negative  8. 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 Coli and Pseudomonas aeruginosa, Day #8 IV Meropenem  10. Acute hypoxic respiratory failure, resolved  11. Diarrhea, presumed secondary to tube feeding  12. Candida UTI, presumptive, with marked pyuria and yeasts, Day #14 Fluconazole    Comment:  WBC increasing again along with CRP but procal decreasing. Plan:   1. Discontinue oral Fluconazole after today  2. Continue Meropenem IV for 2 more weeks    3. Repeat sacral wound culture   4. Repeat urinalysis  5. Cleared for discharge from ID standpoint.      Signed By: Edgar Dixon MD     April 29, 2022

## 2022-04-29 NOTE — DISCHARGE SUMMARY
Discharge Summary       PATIENT ID: Stella Rose  MRN: [id number]   YOB: 1945    DATE OF ADMISSION: 4/3/2022 10:36 PM    DATE OF DISCHARGE:   PRIMARY CARE PROVIDER: Meghann Eason MD     ATTENDING PHYSICIAN: Nathan Soto  DISCHARGING PROVIDER: Nathan Soto      CONSULTATIONS: IP CONSULT TO NEPHROLOGY  IP CONSULT TO INFECTIOUS DISEASES  IP CONSULT TO NEPHROLOGY  IP CONSULT TO NEPHROLOGY  IP CONSULT TO GASTROENTEROLOGY  IP CONSULT TO GASTROENTEROLOGY  IP CONSULT TO PULMONOLOGY  IP CONSULT TO GENERAL SURGERY  IP CONSULT TO GENERAL SURGERY    PROCEDURES/SURGERIES: Procedure(s):  SACRAL DEBRIDEMENT    ADMITTING DIAGNOSES:    Patient Active Problem List    Diagnosis Date Noted    Hyperkalemia 04/04/2022    UTI (urinary tract infection) 04/04/2022    Sepsis ([address] 04/04/2022    RICARDO (acute kidney injury) ([address] 04/04/2022    AMS (altered mental status) 04/04/2022    CVA (cerebral vascular accident) ([address] 02/21/2022    Elevated troponin 02/21/2022    Ischemia of both lower extremities 07/22/2020    Pain in both lower legs 07/22/2020    Acute osteomyelitis of ankle or foot ([address] 04/04/2019    Diabetic peripheral neuropathy ([address] 04/04/2019    Spinal stenosis in cervical region 04/04/2019    Type 2 diabetes mellitus with nephropathy ([address] 01/31/2018    DM type 2 causing vascular disease ([address] 07/27/2017    Hypercalcemia 12/12/2016    PAD (peripheral artery disease) ([address] 04/11/2016    Infection of nailbed of toe of left foot 09/10/2015    Diabetes mellitus with neurological manifestations, uncontrolled 08/08/2013    HTN (hypertension)     Hyperlipidemia     Neuropathy     Sciatica        DISCHARGE DIAGNOSES / PLAN:      Severe sepsis with UTI  UTI/Pseudomonas  Acute on chronic kidney disease  Hyperkalemia  Hypernatremia, improving  CVA with right-sided weakness  Anemia of chronic disease  Gangrene of the left great toe  Sacral decubitus ulcer  Acute respiratory failure  Possible aspiration  Elevated BNP  PEG tube malfunction/seen by the GI now working   Gangrene of the left great toe-s/p removal of left great and second toe  Static post debridement of the sacral wound        DISCHARGE MEDICATIONS:  Current Discharge Medication List      START taking these medications    Details   albuterol-ipratropium (DUO-NEB) 2.5 mg-0.5 mg/3 ml nebu 3 mL by Nebulization route every six (6) hours as needed for Wheezing or Shortness of Breath. Qty: 30 Nebule, Refills: 1  Start date: 4/19/2022      hydrALAZINE (APRESOLINE) 50 mg tablet Take 1 Tablet by mouth three (3) times daily. Qty: 60 Tablet, Refills: 0  Start date: 4/19/2022      metoprolol tartrate (LOPRESSOR) 50 mg tablet Take 1 Tablet by mouth two (2) times a day. Qty: 60 Tablet, Refills: 0  Start date: 4/19/2022         CONTINUE these medications which have NOT CHANGED    Details   gabapentin (NEURONTIN) 300 mg capsule Take 1 Capsule by mouth two (2) times a day. Max Daily Amount: 600 mg. Qty: 12 Capsule, Refills: 0    Associated Diagnoses: Pain      insulin glargine (LANTUS) 100 unit/mL injection 50 Units by SubCUTAneous route daily. Qty: 10 mL, Refills: 0      acidophilus-pectin, citrus 25 million cell -100 mg tab tablet Take 2 Tablets by mouth daily. Qty: 30 Tablet, Refills: 0      amLODIPine (NORVASC) 5 mg tablet Take 1 Tablet by mouth daily. Qty: 30 Tablet, Refills: 0      artificial saliva (MOUTH KOTE) spra Take 1 Spray by mouth three (3) times daily. Qty: 10 mL, Refills: 0      latanoprost (XALATAN) 0.005 % ophthalmic solution Administer 1 Drop to right eye every evening. Qty: 10 mL, Refills: 0      atorvastatin (LIPITOR) 20 mg tablet TAKE 1 TABLET BY MOUTH EVERY NIGHT  Qty: 30 Tablet, Refills: 5    Associated Diagnoses: Type II or unspecified type diabetes mellitus with neurological manifestations, uncontrolled(250.62) ([address] Essential hypertension;  Hyperlipidemia, unspecified hyperlipidemia type; Vitamin D deficiency; Vitamin B12 deficiency; Neuropathy; Type 2 diabetes mellitus with hyperglycemia, with long-term current use of insulin ([address] Diabetes mellitus with neurological manifestations, uncontrolled; Mixed hyperlipidemia; PAD (peripheral artery disease) ([address] Hypercalcemia; Uncontrolled type 2 diabetes mellitus with hyperglycemia, with long-term current use of insulin (HCC)      brimonidine (ALPHAGAN) 0.2 % ophthalmic solution Administer 1 Drop to both eyes three (3) times daily. aspirin delayed-release 81 mg tablet Take  by mouth daily. ferrous sulfate 325 mg (65 mg iron) tablet Take 1 Tab by mouth two (2) times a day. Qty: 60 Tab, Refills: 5    Associated Diagnoses: Diabetes mellitus with neurological manifestations, uncontrolled; Essential hypertension; PAD (peripheral artery disease) ([address] Mixed hyperlipidemia; Type 2 diabetes mellitus with hyperglycemia, with long-term current use of insulin ([facility] [address] Hypercalcemia; Uncontrolled type 2 diabetes mellitus with hyperglycemia, with long-term current use of insulin ([facility] [address] Type II or unspecified type diabetes mellitus with neurological manifestations, uncontrolled(250.62) ([address] Hyperlipidemia, unspecified hyperlipidemia type; Vitamin D deficiency; Vitamin B12 deficiency; Neuropathy         STOP taking these medications       fluconazole (DIFLUCAN) 100 mg tablet Comments:   Reason for Stopping:         lidocaine (XYLOCAINE) 4 % (40 mg/mL) topical solution Comments:   Reason for Stopping:         metoprolol succinate (TOPROL-XL) 50 mg XL tablet Comments:   Reason for Stopping:                 NOTIFY YOUR PHYSICIAN FOR ANY OF THE FOLLOWING:   Fever over 101 degrees for 24 hours. Chest pain, shortness of breath, fever, chills, nausea, vomiting, diarrhea, change in mentation, falling, weakness, bleeding. Severe pain or pain not relieved by medications. Or, any other signs or symptoms that you may have questions about.     DISPOSITION:  x  Home With:   OT PT  HH  RN       Long term SNF/Inpatient Rehab    Independent/assisted living    Hospice    Other:       PATIENT CONDITION AT DISCHARGE: Stable      PHYSICAL EXAMINATION AT DISCHARGE:  General:          Alert, cooperative, no distress, appears stated age. HEENT:           Atraumatic, anicteric sclerae, pink conjunctivae                          No oral ulcers, mucosa moist, throat clear, dentition fair  Neck:               Supple, symmetrical  Lungs:             Clear to auscultation bilaterally. No Wheezing or Rhonchi. No rales. Chest wall:      No tenderness  No Accessory muscle use. Heart:              Regular  rhythm,  No  murmur   No edema  Abdomen:        Soft, non-tender. Not distended. Bowel sounds normal  Extremities:     No cyanosis. No clubbing,                            Skin turgor normal, Capillary refill normal  Skin:                Not pale. Not Jaundiced  No rashes   Psych:             Not anxious or agitated. Neurologic:      Alert, moves all extremities, answers questions appropriately and responds to commands     XR SWALLOW FUNC VIDEO   Final Result   For all attempts, a delayed oral phase was noted. Nectar consistency: Pooling in the vallecula with no penetration or aspiration   Thin liquid consistency: Residue in the vallecula. No penetration or aspiration   Honey consistency: Residue in the vallecula. No penetration or aspiration   Puree consistency: No penetration or aspiration. 1 minute 55 seconds of fluoroscopy   DAP: 229.1   14 mGy   Single fluoroscopic image stored on PACS      XR ABD (KUB)   Final Result   Percutaneous gastrostomy tube tip in the gastric antrum. XR CHEST PORT   Final Result   No acute pulmonary process. XR CHEST PORT   Final Result   No significant change. XR CHEST PORT   Final Result      XR CHEST PORT   Final Result   No acute cardiopulmonary abnormality. .       XR CHEST PORT   Final Result      CT HEAD WO CONT   Final Result   Chronic changes which appear stable. No acute or active intracranial process. Chronic paranasal sinus disease         XR CHEST PORT   Final Result   No acute findings.            Recent Results (from the past 24 hour(s))   GLUCOSE, POC    Collection Time: 04/28/22 11:44 AM   Result Value Ref Range    Glucose (POC) 187 (H) 65 - 117 mg/dL    Performed by Yeimy Paniagua    GLUCOSE, POC    Collection Time: 04/28/22  6:23 PM   Result Value Ref Range    Glucose (POC) 206 (H) 65 - 117 mg/dL    Performed by Yeimy Paniagua    GLUCOSE, POC    Collection Time: 04/28/22  8:24 PM   Result Value Ref Range    Glucose (POC) 196 (H) 65 - 117 mg/dL    Performed by Fiona Velazquez, POC    Collection Time: 04/28/22 11:37 PM   Result Value Ref Range    Glucose (POC) 168 (H) 65 - 117 mg/dL    Performed by Kelli Jones, POC    Collection Time: 04/29/22  5:55 AM   Result Value Ref Range    Glucose (POC) 207 (H) 65 - 117 mg/dL    Performed by Roland Zhang    PROCALCITONIN    Collection Time: 04/29/22  6:47 AM   Result Value Ref Range    Procalcitonin 0.77 (H) 0 ng/mL   C REACTIVE PROTEIN, QT    Collection Time: 04/29/22  6:47 AM   Result Value Ref Range    C-Reactive protein 13.20 (H) 0.00 - 0.60 mg/dL   CBC W/O DIFF    Collection Time: 04/29/22  6:47 AM   Result Value Ref Range    WBC 12.2 (H) 3.6 - 11.0 K/uL    RBC 3.18 (L) 3.80 - 5.20 M/uL    HGB 9.3 (L) 11.5 - 16.0 g/dL    HCT 27.6 (L) 35.0 - 47.0 %    MCV 86.8 80.0 - 99.0 FL    MCH 29.2 26.0 - 34.0 PG    MCHC 33.7 30.0 - 36.5 g/dL    RDW 16.2 (H) 11.5 - 14.5 %    PLATELET 572 942 - 041 K/uL    MPV 11.1 8.9 - 12.9 FL    NRBC 0.0 0.0  WBC    ABSOLUTE NRBC 0.00 0.00 - 1.37 K/uL   METABOLIC PANEL, COMPREHENSIVE    Collection Time: 04/29/22  6:47 AM   Result Value Ref Range    Sodium 137 136 - 145 mmol/L    Potassium 5.1 3.5 - 5.1 mmol/L    Chloride 105 97 - 108 mmol/L    CO2 26 21 - 32 mmol/L    Anion gap 6 5 - 15 mmol/L    Glucose 202 (H) 65 - 100 mg/dL BUN 34 (H) 6 - 20 mg/dL    Creatinine 0.79 0.55 - 1.02 mg/dL    BUN/Creatinine ratio 43 (H) 12 - 20      GFR est AA >60 >60 ml/min/1.73m2    GFR est non-AA >60 >60 ml/min/1.73m2    Calcium 8.6 8.5 - 10.1 mg/dL    Bilirubin, total 0.4 0.2 - 1.0 mg/dL    AST (SGOT) 23 15 - 37 U/L    ALT (SGPT) 32 12 - 78 U/L    Alk. phosphatase 96 45 - 117 U/L    Protein, total 6.3 (L) 6.4 - 8.2 g/dL    Albumin 1.7 (L) 3.5 - 5.0 g/dL    Globulin 4.6 (H) 2.0 - 4.0 g/dL    A-G Ratio 0.4 (L) 1.1 - 2.2            HOSPITAL COURSE:    HPI: Patient is a 69 y. o. year old female with signal past medical history of CVA with PEG tube left great toe gangrene, chronic kidney disease CVA with right-sided weakness hypertension type 2 diabetes bedridden open eyes do not follow any command was transferred to the hospital with fever and altered mental status as per SNF staff patient was awake and following simple command at the nursing home facility and since yesterday not able to follow any command confused transferred to the ER seen by the ER physician work-up shows acute kidney injury with hyperkalemia        Admitted for further evaluation and treat.     04/05   Patient is seen at bedside with daughter and nephew today. Patient is in distress due to pain and daughter notes that patient gets Neurontin TID for neuropathy daily. Otherwise, patient is still receiving enteric feeding through PEG tube and getting IVF.      Patient was seen by nephrology yesterday. Recommends obtaining renal panel and continuing IVF. Patient was seen by ID yesterday. Recommends continuing IV vancomycin for urosepsis.      Patient was seen by pulmonology today. Recommends PRBC transfusion.      Labs indicate WBC 16.9, Hgb 6.3, Na 148, Cl 120, BUN 72, Cr 1.71, Ca 8, CRP 28.4, pro-carrie 6.93 and .         04/06   Patient is seen at bedside. Patient received 2 units of PRBC. Patient is on Zosyn.   No new changes today.      Patient seen by the vascular surgeon he recommend great toe amputation  And for sacral wound recommend Medihoney ointment      Labs show increasing Hgb of 9, decreasing WBC 14.7, Na 147, , BUN 55,   Cr 1.47, and CRP 21.4.     CXR is unremarkable.      04/07  Patient is seen resting at bedside. Patient received one unit of PRBC with increased Hgb of 10.6. No acute changes today.     Patient is seen by nephrology who recommends free water flushes to correct hypernatremia.      Labs show increasing Hgb of 10.6, WBC 15.1, pro-calcitonin 3.78, and CRP 16. 1.     4/8     Patient had a rapid response this morning ABG and chest x-ray was ordered  Patient tachycardic tachypneic  ABG  pH 7.51 PCO2 31 PO2 65 bicarb 26 oxygen saturation 95% on room air     Chest x-ray shows no much changes     4/9     Patient resting the bed open eyes  On room air/breathing through the mouth     4/10     Patient open eyes not in distress breathing through her mouth  According to the nurse patient not sleep all day and all night yesterday        4/11  Patient is seen at bedside. She is awake but not in any distress. Patient is on zosyn. PEG tube in place.      Urine culture positive for Pseudomonas.     CXR shows no focal changes. Labs remarkable for WBC  22.3, Hgb 10.5, Na 153, , Cr 1.55 BUN 94, pro-calcitonin 1.64, and CRP 3.33.     4/12  Patient is on Vancomycin, zosyn and D5W. PEG tube in place. Spoke to daughter at bedside. She has no new complaints. Labs remarkable for:  WBC  18.9,  Cr 1.33, BUN 84,  and CRP 2.33 which are decreasing. Hgb 11.2, Na-corrected 158, , and pro-calcitonin 1.93 which are increasing.      Seen by pulmonology. No new recommendations.     Seen by GI. No new recommendations.      Seen by nephrology. recommends IV D5W IVF to decrease hypernatremia.     Seen by ID. Recommends discontinuing fluconazole, follow up blood cultures, and continuing IV zosyn and vancomycin.         4/13/2022  Patient is on Vancomycin, zosyn and D5W.  PEG tube in place. Nephrology - recommends IV D5W IVF to decrease hypernatremia. Increase glargine to 25 units twice daily to correct hyperglycemia. Once the D5W is discontinued, the dose of glargine will have to be decreased     ID - Continue IV Zosyn and Vancomycin; discontinue Vancomycin if WBC continues to decrease     Pulmonology - No new recommendations.     GI - No new recommendations.     CXR 4/11/2022 - No acute pulmonary process (no focal changes)      Labs remarkable for:  Blood Glucose 380  WBC  19.8  Hgb 10.3   Na-corrected 154  BUN 69  Cr 1.22  CRP 3.14  pro-calcitonin 2.28      4/14  Patient's family members are present at bedside with the patient. They notes that the patient was in distress this morning. Patient still on vancomycin and zosyn. PEG tube in place and patient is receiving enteral feedings.      Seen by vascular surgeon who recommends great toe amputation.      Labs remarkable for Na-corrected 154 improving, Cr 1.19 improving, pro-carrie 2.42,  and CRP 7.65.     4/15  Patient is seen grunting and restless at bedside. Daughter is at bedside. She has no new complaints.      UA on 4/14 still positive for WBC>100, leukocyte esterase and grew yeast.  Labs remarkable for WBC 15.9 decreasing, Hb 9.5, Na-corrected 154, and CRP 7.99.     Patient seen by ID. Recommends continuing zosyn and discontinuing vancomycin.     4/18  Patient is seen at bedside. She is awake and alert x3. She is able to say a few words this morning. She has no new complaints. She is on zosyn and fluconazole. PEG tube is clogged.     Seen by vascular surgeon. Recommends great toe and second toe amputation. Awaiting for consent from daughter.      Labs remarkable for WBC 11.4 decreasing, Hb 9.8, Na 145, and pro-calcitonin 2.01.     4/19  Patient was talking this morning and has no new complaints today. Patient is still receiving fluconazole.  PEG tube in place and working.      Labs remarkable for WBC 12.9, Hgb 9.1, and CRP 7.68.  Seen by ID. Recommends transitioning to oral fluconazole 200 mg daily for 11 more days.        History of present illness patient 54 physical at the time of admission as a patient admitted with sepsis UTI acute on chronic kidney disease hyponatremia patient history of CVA history of gangrene of the left great toe sacral decubitus ulcers patient was seen by wound care also seen by the vascular surgeon recommend to continue monitor the gangrene of the foot no surgery at this time patient also seen by the wound care nurse regarding sacral decubitus ulcer no need of debridement at this time patient was treated for hypernatremia and acute kidney injury by the nephrologist which significant improvement    Seen by infectious disease treated with IV Zosyn now discharging back to nursing home on full p.o. fluconazole    Other GI as PEG tube came out not functioning well replaced yesterday patient tolerated the procedure well and tolerating the feeding today    Otherwise patient remained stable follow-up with vascular surgeon as an outpatient regarding gangrene of the foot    Change position every 2 hours at the nursing home for sacral decubitus ulcer keep the wounds clean      Medication reconciliation done time shopping 35 minutes 50% time spent counseling on coordination of    Discharged on fluconazole 200 mg daily for 9 days    Discussed with the patient daughter in detail she is upset about the wound care/wound vacuum not placed    Vascular surgeon recommend wound vacuum placed at the time of discharge    Patient daughter want to talk to vascular surgeon  regarding wound vacuum          On oral fluconazole 200 mg for  Today  And IV meropenem for 2 more weeks      Patient have a PICC line placed  Patient is alert awake not in distress    Cleared for discharge waiting for placement      Discussed with the patient daughter at the bedside    Signed:   Elsy Marx MD  4/29/2022  1:39 PM

## 2022-04-29 NOTE — PROGRESS NOTES
11:10am CM has attempted to reach out to [facility] in regards to the second level appeal. No answer. Voice message left requesting returned call. Patient has been accepted to [facility], however, CM needs clarification on the second level appeal process. Also, need wound vac orders from Dr. Bethanie Rene for the SNF to have upon discharge. 3:52pm CM received voice message from [facility] returning CM call. On the voice message they stated the second level appeal was initiated on 4/28 and they have up to 14 days to make a determination. CM attempted to call back at [phone] with no answer again. CM is seeking clarification on second level appeal and if the patient has to remain in the hospital until the determination is made.

## 2022-04-29 NOTE — PROGRESS NOTES
SPEECH LANGUAGE PATHOLOGY DYSPHAGIA RE-ASSESSMENT  Patient: Nivia Dalal (53 y.o. female)  Date: 4/29/2022  Diagnosis: Sepsis ([facility] [address] [A41.9]  UTI (urinary tract infection) [N39.0]  AMS (altered mental status) [R41.82]  RICARDO (acute kidney injury) ([facility] [address] [N17.9]  Hyperkalemia [E87.5] <principal problem not specified>  Procedure(s) (LRB):  SACRAL DEBRIDEMENT (N/A) 8 Days Post-Op  Precautions: aspiration     ASSESSMENT:  Patient w/ slow responses to questions. Patient continues w/ oral, oropharyngeal dysphagia c/b motor apraxia and generalized weakness. Improvement is noted in some volitional oral motor movements such as lingual protrusion and labial closure. Patient w/ reduced labial seal around spoon and cup. Some anterior spillage. Stronger seal on left then right. Improvement taking bolus from spoon and cup. Slow oral transit w/ reduce lingual involvement and delay in the initiation of swallow. Once swallow is initiated, HLE and protraction adequate to palpation. No overt s/sx of aspiration are observed w/ mildly thick, thin and puree ( applesauce ) trials. Patient remains at risk for aspiration w/ all intake due to oral motor control and swallow delay. Decreased bolus control w/ rapid A-P for thin liquid trials, however no overt s/sx of aspiration. PLAN:  Recommendations and Planned Interventions:  Continue w/ therapeutic trials of full, mildly thick w/ SLP due to fluctuating alertness. Aspiration precautions. Family ok to provide. Continue w/ ST     Patient continues to benefit from skilled intervention to address the above impairments. Continue treatment per established plan of care. Frequency/Duration: Patient will be followed by speech-language pathology 5 times a week to address goals. Discharge Recommendations:  Skilled Nursing Facility     SUBJECTIVE:   Patient alert and seen at bedside. Family present in room.      OBJECTIVE:     CXR Results  (Last 48 hours)      None          CT Results  (Last 48 hours)      None           Cognitive and Communication Status:  Neurologic State: Alert  Orientation Level: Oriented to person,Oriented to place  Cognition: Follows commands,Decreased attention/concentration             After treatment:   Patient left in no apparent distress in bed, Call bell within reach, Caregiver / family present, and Bed / chair alarm activated    COMMUNICATION/EDUCATION:   Patient was educated regarding her deficit(s) of dysphagia, swallow safety precautions, diet recs and POC. She demonstrated Fair understanding as evidenced by verbal understanding. The patient's plan of care including recommendations, planned interventions, and recommended diet changes were discussed with: PhysicianCrystal Conway, SLP M.S. CCC-SLP  Time Calculation: 10 mins           Problem: Dysphagia (Adult)  Goal: *Acute Goals and Plan of Care (Insert Text)  Description: Speech Therapy Swallow Goals  Initiated 4/22/2022        -Patient will tolerate PO trials without clinical indicators of aspiration given moderate cues within 5-7day(s). -Patient will participate in modified barium swallow study within 5-7 day(s). [MET]      -Patient will demonstrate understanding of swallow safety precautions and aspiration precautions, diet recs with moderate cues within 5-7 day(s).     -Patient will participate in speech-language evaluation as indicated within 5-7 days    -Patient will tolerate Full, mildly thick trials w/o clinical indicators of aspiration and improved bolus oral clearance/management within 5-7 days  4/29/2022 1406 by Arely Christianson SLP  Outcome: Progressing Towards Goal  4/29/2022 1405 by Arely Christianson SLP  Outcome: Progressing Towards Goal

## 2022-04-29 NOTE — PROGRESS NOTES
Progress Note    Patient: Raissa Husain MRN: [id number]  SSN: [id number]    YOB: 1945  Age: 78 y.o. Sex: female      Admit Date: 4/3/2022    LOS: 25 days     Subjective:   Patient followed for sepsis with now presumptive sacral wound infection secondary to E. Coli and Pseudomonas, now status post debridement. Still on Fluconazole for Candida UTI. Patient is awake today and appears to be resting comfortably. She offered no complaints. She is afebrile with slight increase in WBC today. Still pending disposition to SNF. Objective:     Vitals:    04/28/22 1935 04/29/22 0147 04/29/22 0732 04/29/22 1530   BP: (!) 148/64 137/68 (!) 152/53 129/78   Pulse: 89 76 81 61   Resp: 16 18 18 18   Temp: 97.9 °F (36.6 °C) 98.2 °F (36.8 °C) 99 °F (37.2 °C) 97.7 °F (36.5 °C)   SpO2: 99% 99% 97% 99%   Weight:       Height:            Intake and Output:  Current Shift: No intake/output data recorded. Last three shifts: 04/27 1901 - 04/29 0700  In: 400   Out: 775 [Urine:775]    Physical Exam:   Vitals and nursing note reviewed. Constitutional:       General: She is not in acute distress. Appearance: She is ill-appearing. HENT: eyes closed, Room Air   Cardiovascular:      Rate and Rhythm: Normal rate and regular rhythm. Heart sounds: No murmur heard. Pulmonary:      Effort: Pulmonary effort is normal.      Breath sounds: Normal breath sounds. Abdominal:      General: Bowel sounds are normal.      Palpations: Abdomen is soft. Tenderness: There is no abdominal tenderness. Comments: PEG tube in place, site unremarkable  Genitourinary:     Comments: Bell catheter with mild urine sediment  Flexiseal with empty bag at this time  Musculoskeletal:      Right lower leg: No edema. Left lower leg: No edema. Comments: Left foot surgical site is dry  Skin: Stage 3 sacral wound with granulating base     Findings: No rash.    Neurological: awake and responsive  Psychiatric: normal behavior     Lab/Data Review:     WBC 12,200  UA with WBC >100, budding yeasts    Procal 0.77 <0.80 <0.56 <0.59 <1.47 < 2.91 <4.99 <5.34   CRP 13.20 <11.20 <10.20 <10.10     Blood cultures (4/3) No growth FINAL  Blood cultures (4/10)  No growth FINAL   Urine culture (4/7) >100,000 cfu/ml Pseudomonas aeruginosa   Urine culture (4/14) >100,000 cfu/ml mixed urogenital yasmine   Wound culture sacrum (4/20) E. Coli and Pseudomonas aeruginosa FINAL  Tissue culture sacrum (4/20) Coaugulase negative staphylococci consistent with colonization FINAL   Left foot tissue (4/20) Coagulase negative Staphylococcus species FINAL    CXR (4/11) No acute pulmonary process    Assessment:     Active Problems:    Hyperkalemia (4/4/2022)      UTI (urinary tract infection) (4/4/2022)      Sepsis ([facility] [address] (4/4/2022)      RICARDO (acute kidney injury) ([facility] [address] (4/4/2022)      AMS (altered mental status) (4/4/2022)    1. Sepsis with fever and leukocytosis, elevated procal and CRP, resolving  2. UTI with marked pyuria and bacteriuria, secondary to Pseudomonas aeruginosa, status post 14 days IV Zosyn   3. Altered mentals status, multifactorial including sepsis  4. Uncontrolled diabetes mellitus with hyperglycemia  5. Chronic maxillary sinusitis  6. ASCVD with prior stroke  7. Hepatitis C antibody positive, HCV RNA negative  8. 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 Coli and Pseudomonas aeruginosa, Day #8 IV Meropenem  10. Acute hypoxic respiratory failure, resolved  11. Diarrhea, presumed secondary to tube feeding  12. Candida UTI, presumptive, with marked pyuria and yeasts, Day #14 Fluconazole    Comment:  WBC increasing again along with CRP but procal decreasing. Plan:   1. Discontinue oral Fluconazole after today  2. Continue Meropenem IV for 2 more weeks    3. Repeat sacral wound culture   4. Repeat urinalysis  5. Cleared for discharge from ID standpoint.      Signed By: Tayla Almonte MD     April 29, 2022

## 2022-04-30 NOTE — CONSULTS
Consult Date: 4/30/2022    Consults    Subjective      77y F w/ pmhx remarkable for being bedbound, hx of CVA s/p PEG tube placement, hx of chronic Left foot gangrene, HTN and DM BIBEMS due to reports of fever. At present limited information obtained from pt; most of the history obtained from EMR. Hospital course c/b recurrent L foot gangrene requiring I&D and RICARDO. Evaluated and seen at bedside, no distress, appears to be at baseline. Nephrology consulted for RICARDO.          Past Medical History:   Diagnosis Date    Diabetes mellitus ([address]     HTN (hypertension)     Hyperlipidemia     Neuropathy     PAD (peripheral artery disease) (HCC)     PCI    Sciatica       Past Surgical History:   Procedure Laterality Date    HX AMPUTATION Right     great toe    HX CERVICAL FUSION      HX OTHER SURGICAL Bilateral     Stent placement    HX OTHER SURGICAL      HX VASCULAR STENT Bilateral     2 in right 1 in left    HX VASCULAR STENT Bilateral      Family History   Problem Relation Age of Onset    Cancer Mother     Diabetes Mother     Hypertension Mother       Social History     Tobacco Use    Smoking status: Never Smoker    Smokeless tobacco: Never Used   Substance Use Topics    Alcohol use: Not Currently       Current Facility-Administered Medications   Medication Dose Route Frequency Provider Last Rate Last Admin    flucytosine (ANCOBON) capsule 500 mg  500 mg Oral Q6H Kameron Varma MD        meropenem (MERREM) 1 g in 0.9% sodium chloride (MBP/ADV) 50 mL IVPB  1 g IntraVENous Q8H Kameron Varma MD 16.7 mL/hr at 04/30/22 0931 1 g at 04/30/22 0931    traMADoL (ULTRAM) tablet 50 mg  50 mg Oral Q6H PRN David Dumont MD   50 mg at 04/23/22 2011    gabapentin (NEURONTIN) capsule 300 mg  300 mg Oral BID Israel Soto MD   300 mg at 04/30/22 0932    insulin glargine (LANTUS) injection 32 Units  32 Units SubCUTAneous BID Lynne Soto MD   32 Units at 04/30/22 0932    artificial saliva (MOUTH KOTE) 1 Spray  1 Spray Oral PRN Israel oSto MD   1 Spray at 04/15/22 1053    amLODIPine (NORVASC) tablet 5 mg  5 mg Oral DAILY Israel Soto MD   5 mg at 04/30/22 0932    metoprolol tartrate (LOPRESSOR) tablet 50 mg  50 mg Oral BID Israel Soto MD   50 mg at 04/30/22 0932    hydrALAZINE (APRESOLINE) tablet 50 mg  50 mg Oral TID Delano Soto MD   50 mg at 04/30/22 0932    [Held by provider] furosemide (LASIX) injection 40 mg  40 mg IntraVENous DAILY Israel Soto MD   40 mg at 04/09/22 1054    labetaloL (NORMODYNE;TRANDATE) 20 mg/4 mL (5 mg/mL) injection 20 mg  20 mg IntraVENous Q4H PRN Israel Soto MD   20 mg at 04/15/22 0255    hydrALAZINE (APRESOLINE) 20 mg/mL injection 20 mg  20 mg IntraVENous Q6H PRN Israel Soto MD   20 mg at 04/24/22 2247    0.9% sodium chloride infusion 250 mL  250 mL IntraVENous PRN Jose Crespo MD        insulin lispro (HUMALOG) injection   SubCUTAneous Q6H Israel Soto MD   4 Units at 04/30/22 1151    atorvastatin (LIPITOR) tablet 20 mg  20 mg Oral QHS Israel Soto MD   20 mg at 04/29/22 2206    latanoprost (XALATAN) 0.005 % ophthalmic solution 1 Drop  1 Drop Right Eye QPM Israel Soto MD   1 Drop at 04/29/22 1800    aspirin delayed-release tablet 81 mg  81 mg Oral DAILY Israel Soto MD   81 mg at 04/30/22 0932    brimonidine (ALPHAGAN) 0.2 % ophthalmic solution 1 Drop  1 Drop Both Eyes TID Israel Soto MD   1 Drop at 04/30/22 0935    glucose chewable tablet 16 g  4 Tablet Oral PRN Delano Soto MD        glucagon (GLUCAGEN) injection 1 mg  1 mg IntraMUSCular PRN Israel Soto MD   1 mg at 04/18/22 1145    acetaminophen (TYLENOL) suppository 650 mg  650 mg Rectal Q6H PRN Delano Soto MD        polyethylene glycol (MIRALAX) packet 17 g  17 g Oral DAILY PRN Delano Soto MD        ondansetron (ZOFRAN ODT) tablet 4 mg  4 mg Oral Q8H PRN Delano Soto MD        Or    ondansetron [facility] injection 4 mg  4 mg IntraVENous Q6H PRN Mazin Soto MD        heparin (porcine) injection 5,000 Units  5,000 Units SubCUTAneous Fadia Dixon MD   5,000 Units at 04/30/22 0932    albuterol-ipratropium (DUO-NEB) 2.5 MG-0.5 MG/3 ML  3 mL Nebulization Q6H PRN Mazin Soto MD        ferrous sulfate 300 mg (60 mg iron)/5 mL oral syrup 300 mg  300 mg Oral BID Israel Soto MD   300 mg at 04/30/22 0931    acetaminophen (TYLENOL) solution 650 mg  650 mg Per NG tube Q6H PRN Mazin Soto MD   650 mg at 04/19/22 2787        Review of Systems   Unable to perform ROS: Other       Objective     Vital signs for last 24 hours:  Visit Vitals  BP (!) 148/63 (BP 1 Location: Left upper arm, BP Patient Position: At rest)   Pulse 79   Temp 99.1 °F (37.3 °C)   Resp 18   Ht 5' 7.99\" (1.727 m)   Wt 86.6 kg (191 lb)   SpO2 100%   BMI 29.05 kg/m²       Intake/Output this shift:  Current Shift: No intake/output data recorded. Last 3 Shifts: No intake/output data recorded.     Data Review:   Recent Results (from the past 24 hour(s))   URINALYSIS W/MICROSCOPIC    Collection Time: 04/29/22  4:20 PM   Result Value Ref Range    Color Yellow/Straw      Appearance Turbid (A) Clear      Specific gravity 1.013 1.003 - 1.030      pH (UA) 7.0 5.0 - 8.0      Protein 100 (A) Negative mg/dL    Glucose 50 (A) Negative mg/dL    Ketone Negative Negative mg/dL    Bilirubin Negative Negative      Blood Negative Negative      Urobilinogen 0.1 0.1 - 1.0 EU/dL    Nitrites Negative Negative      Leukocyte Esterase Large (A) Negative      WBC  0 - 4 /hpf    RBC >100 (H) 0 - 5 /hpf    Bacteria Negative Negative /hpf    Mucus Trace /lpf    PRESUMPTIVE YEAST Present      Other Urine Micro Present     GLUCOSE, POC    Collection Time: 04/29/22  6:03 PM   Result Value Ref Range    Glucose (POC) 220 (H) 65 - 117 mg/dL    Performed by Sergio Dunn, POC    Collection Time: 04/29/22  8:33 PM   Result Value Ref Range    Glucose (POC) 239 (H) 65 - 117 mg/dL    Performed by Melisa Veliz, POC    Collection Time: 04/30/22 12:02 AM   Result Value Ref Range    Glucose (POC) 237 (H) 65 - 117 mg/dL    Performed by Laisha Ha 10, POC    Collection Time: 04/30/22  6:36 AM   Result Value Ref Range    Glucose (POC) 202 (H) 65 - 117 mg/dL    Performed by [address], POC    Collection Time: 04/30/22 11:06 AM   Result Value Ref Range    Glucose (POC) 226 (H) 65 - 117 mg/dL    Performed by Myrna Witt        Physical Exam  Vitals reviewed. Constitutional:       Comments: No distress   HENT:      Head: Atraumatic. Cardiovascular:      Rate and Rhythm: Normal rate. Pulses: Normal pulses. Heart sounds: No murmur heard. No gallop. Pulmonary:      Effort: Pulmonary effort is normal. No respiratory distress. Breath sounds: No wheezing. Abdominal:      General: Abdomen is flat. Bowel sounds are normal. There is no distension. Musculoskeletal:      Cervical back: Neck supple. Neurological:      Mental Status: Mental status is at baseline.          Current Facility-Administered Medications:     flucytosine (ANCOBON) capsule 500 mg, 500 mg, Oral, Q6H, Jatinder Clemons MD    meropenem (MERREM) 1 g in 0.9% sodium chloride (MBP/ADV) 50 mL IVPB, 1 g, IntraVENous, Q8H, Suma Monzon MD, Last Rate: 16.7 mL/hr at 04/30/22 0931, 1 g at 04/30/22 0931    traMADoL (ULTRAM) tablet 50 mg, 50 mg, Oral, Q6H PRN, Tiffany Armstrong MD, 50 mg at 04/23/22 2011    gabapentin (NEURONTIN) capsule 300 mg, 300 mg, Oral, BID, Israel Soto MD, 300 mg at 04/30/22 0932    insulin glargine (LANTUS) injection 32 Units, 32 Units, SubCUTAneous, BID, Israel Soto MD, 32 Units at 04/30/22 0932    artificial saliva (MOUTH KOTE) 1 Spray, 1 Spray, Oral, PRN, Israel Soto MD, 1 Spray at 04/15/22 1053    amLODIPine (NORVASC) tablet 5 mg, 5 mg, Oral, DAILY, Jaren Frances MD, 5 mg at 04/30/22 0932    metoprolol tartrate (LOPRESSOR) tablet 50 mg, 50 mg, Oral, BID, Lynne Soto MD, 50 mg at 04/30/22 0932    hydrALAZINE (APRESOLINE) tablet 50 mg, 50 mg, Oral, TID, Lynne Soto MD, 50 mg at 04/30/22 0932    [Held by provider] furosemide (LASIX) injection 40 mg, 40 mg, IntraVENous, DAILY, Israel Soto MD, 40 mg at 04/09/22 1054    labetaloL (NORMODYNE;TRANDATE) 20 mg/4 mL (5 mg/mL) injection 20 mg, 20 mg, IntraVENous, Q4H PRN, Israel Soto MD, 20 mg at 04/15/22 0255    hydrALAZINE (APRESOLINE) 20 mg/mL injection 20 mg, 20 mg, IntraVENous, Q6H PRN, Israel Soto MD, 20 mg at 04/24/22 0521    0.9% sodium chloride infusion 250 mL, 250 mL, IntraVENous, PRN, Martínez Crespo MD    insulin lispro (HUMALOG) injection, , SubCUTAneous, Q6H, Israel Soto MD, 4 Units at 04/30/22 1151    atorvastatin (LIPITOR) tablet 20 mg, 20 mg, Oral, QHS, Israel Soto MD, 20 mg at 04/29/22 2206    latanoprost (XALATAN) 0.005 % ophthalmic solution 1 Drop, 1 Drop, Right Eye, QPM, Israel Soto MD, 1 Drop at 04/29/22 1800    aspirin delayed-release tablet 81 mg, 81 mg, Oral, DAILY, Israel Soto MD, 81 mg at 04/30/22 0932    brimonidine (ALPHAGAN) 0.2 % ophthalmic solution 1 Drop, 1 Drop, Both Eyes, TID, Israel Soto MD, 1 Drop at 04/30/22 0935    glucose chewable tablet 16 g, 4 Tablet, Oral, PRN, Lynne Soto MD    glucagon (GLUCAGEN) injection 1 mg, 1 mg, IntraMUSCular, PRN, Israel Soto MD, 1 mg at 04/18/22 1145    [DISCONTINUED] acetaminophen (TYLENOL) tablet 650 mg, 650 mg, Oral, Q6H PRN, 650 mg at 04/04/22 2246 **OR** acetaminophen (TYLENOL) suppository 650 mg, 650 mg, Rectal, Q6H PRN, Lynne Soto MD    polyethylene glycol (MIRALAX) packet 17 g, 17 g, Oral, DAILY PRN, Lynne Soto MD    ondansetron (ZOFRAN ODT) tablet 4 mg, 4 mg, Oral, Q8H PRN **OR** ondansetron (ZOFRAN) injection 4 mg, 4 mg, IntraVENous, Q6H PRN, Brittany, Lynne Ice, MD    heparin (porcine) injection 5,000 Units, 5,000 Units, SubCUTAneous, Q8H, Israel Soto MD, 5,000 Units at 04/30/22 0932    albuterol-ipratropium (DUO-NEB) 2.5 MG-0.5 MG/3 ML, 3 mL, Nebulization, Q6H PRN, Mark Soto MD    ferrous sulfate 300 mg (60 mg iron)/5 mL oral syrup 300 mg, 300 mg, Oral, BID, Mark Soto MD, 300 mg at 04/30/22 9919    acetaminophen (TYLENOL) solution 650 mg, 650 mg, Per NG tube, Q6H PRN, Israel Soto MD, 650 mg at 04/19/22 8841    Recent Results (from the past 24 hour(s))   URINALYSIS W/MICROSCOPIC    Collection Time: 04/29/22  4:20 PM   Result Value Ref Range    Color Yellow/Straw      Appearance Turbid (A) Clear      Specific gravity 1.013 1.003 - 1.030      pH (UA) 7.0 5.0 - 8.0      Protein 100 (A) Negative mg/dL    Glucose 50 (A) Negative mg/dL    Ketone Negative Negative mg/dL    Bilirubin Negative Negative      Blood Negative Negative      Urobilinogen 0.1 0.1 - 1.0 EU/dL    Nitrites Negative Negative      Leukocyte Esterase Large (A) Negative      WBC  0 - 4 /hpf    RBC >100 (H) 0 - 5 /hpf    Bacteria Negative Negative /hpf    Mucus Trace /lpf    PRESUMPTIVE YEAST Present      Other Urine Micro Present     GLUCOSE, POC    Collection Time: 04/29/22  6:03 PM   Result Value Ref Range    Glucose (POC) 220 (H) 65 - 117 mg/dL    Performed by Josselyn Bustillo, POC    Collection Time: 04/29/22  8:33 PM   Result Value Ref Range    Glucose (POC) 239 (H) 65 - 117 mg/dL    Performed by Kelli Jones, POC    Collection Time: 04/30/22 12:02 AM   Result Value Ref Range    Glucose (POC) 237 (H) 65 - 117 mg/dL    Performed by Laisha Ha 10, POC    Collection Time: 04/30/22  6:36 AM   Result Value Ref Range    Glucose (POC) 202 (H) 65 - 117 mg/dL    Performed by [address], POC    Collection Time: 04/30/22 11:06 AM   Result Value Ref Range    Glucose (POC) 226 (H) 65 - 117 mg/dL    Performed by [address] [facility] [address]            1. Non oliguric RICARDO sec to pre renal azotemia - SCR peaked at 1.87  At present RICARDO resolved     2. CKD stage II - c/w renal diet as tolerated  C/w to adjust all meds to current GFR  C/w strict I/O for additional monitoring  C/w to avoid nephrotoxins as much as possible  Trend bmp for additional monitoring    3. Anemia of chronic disease- c/w cbc trend for additional monitoring     4. Free water deficit of 1.5L approx - resolved    5. Hx of PEG tube malfunction- as per GI and primary team  Resume tube feeds renally adjusted    6.  Hx of Gangrene of  L foot - s/p I&D- rest of management as per surgery and wound care       Signed By: Gayatri Amato MD     April 30, 2022

## 2022-04-30 NOTE — PROGRESS NOTES
Progress Note    Patient: Waqas Henson MRN: [id number]  SSN: [id number]    YOB: 1945  Age: 78 y.o. Sex: female      Admit Date: 4/3/2022    LOS: 26 days     Subjective:   Patient followed for sepsis with now presumptive sacral wound infection secondary to E. Coli and Pseudomonas, now status post debridement. Still on Fluconazole for Candida UTI. Patient is awake today and appears to be resting comfortably. She offered no complaints. She is afebrile with slight increase in WBC today. Still pending disposition to SNF. Objective:     Vitals:    04/29/22 1539 04/29/22 2034 04/30/22 0230 04/30/22 0917   BP: (!) 115/39 (!) 143/59 136/60 (!) 148/63   Pulse: 76 85 90 79   Resp: 18 17 18 18   Temp: 97.6 °F (36.4 °C) 98.8 °F (37.1 °C) 97.8 °F (36.6 °C) 99.1 °F (37.3 °C)   SpO2: 98% 98% 98% 100%   Weight:       Height:            Intake and Output:  Current Shift: No intake/output data recorded. Last three shifts: No intake/output data recorded. Physical Exam:   Vitals and nursing note reviewed. Constitutional:       General: She is not in acute distress. Appearance: She is ill-appearing. HENT: eyes closed, Room Air   Cardiovascular:      Rate and Rhythm: Normal rate and regular rhythm. Heart sounds: No murmur heard. Pulmonary:      Effort: Pulmonary effort is normal.      Breath sounds: Normal breath sounds. Abdominal:      General: Bowel sounds are normal.      Palpations: Abdomen is soft. Tenderness: There is no abdominal tenderness. Comments: PEG tube in place, site unremarkable  Genitourinary:     Comments: Bell catheter with mild urine sediment  Flexiseal with empty bag at this time  Musculoskeletal:      Right lower leg: No edema. Left lower leg: No edema. Comments: Left foot surgical site is dry  Skin: Stage 3 sacral wound with granulating base     Findings: No rash.    Neurological: awake and responsive  Psychiatric: normal behavior     Lab/Data Review:     WBC 12,200  UA with WBC >100, budding yeasts    Procal 0.77 <0.80 <0.56 <0.59 <1.47 < 2.91 <4.99 <5.34   CRP 13.20 <11.20 <10.20 <10.10     Blood cultures (4/3) No growth FINAL  Blood cultures (4/10)  No growth FINAL   Urine culture (4/7) >100,000 cfu/ml Pseudomonas aeruginosa   Urine culture (4/14) >100,000 cfu/ml mixed urogenital yasmine   Wound culture sacrum (4/20) E. Coli and Pseudomonas aeruginosa FINAL  Tissue culture sacrum (4/20) Coaugulase negative staphylococci consistent with colonization FINAL   Left foot tissue (4/20) Coagulase negative Staphylococcus species FINAL  Wound culture sacrum (4/29) Pending    CXR (4/11) No acute pulmonary process    Assessment:     Active Problems:    Hyperkalemia (4/4/2022)      UTI (urinary tract infection) (4/4/2022)      Sepsis ([facility] [address] (4/4/2022)      RICARDO (acute kidney injury) ([facility] [address] (4/4/2022)      AMS (altered mental status) (4/4/2022)    1. Sepsis with fever and leukocytosis, elevated procal and CRP, resolving  2. UTI with marked pyuria and bacteriuria, secondary to Pseudomonas aeruginosa, status post 14 days IV Zosyn   3. Altered mentals status, multifactorial including sepsis  4. Uncontrolled diabetes mellitus with hyperglycemia  5. Chronic maxillary sinusitis  6. ASCVD with prior stroke  7. Hepatitis C antibody positive, HCV RNA negative  8. 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 Coli and Pseudomonas aeruginosa, Day #9 IV Meropenem  10. Acute hypoxic respiratory failure, resolved  11. Diarrhea, presumed secondary to tube feeding  12. Candida UTI, presumptive, with persistent pyuria and yeasts, status post Fluconazole, therapeutic failure    Comment:  WBC increasing again along with CRP. Concerned about Fluconazole resistance with persistent pyuria and funguria. Plan:   1. Discontinue oral Fluconazole  2. Start Flucytosine 500 mg po Q6 hours  3.  Continue Meropenem IV for 2 more weeks    4.  Follow-up repeat sacral wound culture       Signed By: Geoff Vasquez MD     April 30, 2022

## 2022-04-30 NOTE — PROGRESS NOTES
Progress Note    Patient: Jean Lr MRN: [id number]  SSN: [id number]    YOB: 1945  Age: 78 y.o. Sex: female      Admit Date: 4/3/2022    LOS: 26 days     Subjective:     78years old history of multiple strokes in the past including pontine CVA admitted for sepsis UTI history of PAD with dry gangrene with stage III decubitus ulcer bedbound lying comfortably no cough no complaints. Objective:     Vitals:    04/29/22 1530 04/29/22 1539 04/29/22 2034 04/30/22 0230   BP:  (!) 115/39 (!) 143/59 136/60   Pulse:  76 85 90   Resp:  18 17 18   Temp:  97.6 °F (36.4 °C) 98.8 °F (37.1 °C) 97.8 °F (36.6 °C)   SpO2: 99% 98% 98% 98%   Weight:       Height:            Intake and Output:  Current Shift: No intake/output data recorded. Last three shifts: No intake/output data recorded. Physical Exam:   General:  Alert, cooperative, no distress, appears stated age. Eyes:  Conjunctivae/corneas clear. PERRL, EOMs intact. Fundi benign   Ears:  Normal TMs and external ear canals both ears. Nose: Nares normal. Septum midline. Mucosa normal. No drainage or sinus tenderness. Mouth/Throat: Lips, mucosa, and tongue normal. Teeth and gums normal.   Neck: Supple, symmetrical, trachea midline, no adenopathy, thyroid: no enlargment/tenderness/nodules, no carotid bruit and no JVD. Back:   Symmetric, no curvature. ROM normal. No CVA tenderness. Lungs:   Clear to auscultation bilaterally. Heart:  Regular rate and rhythm, S1, S2 normal, no murmur, click, rub or gallop. Abdomen:   Soft, non-tender. Bowel sounds normal. No masses,  No organomegaly. Extremities: Extremities weakness . Pulses: 2+ and symmetric all extremities. Skin: Skin color, texture, turgor normal. No rashes or lesions   Lymph nodes: Cervical, supraclavicular, and axillary nodes normal.   Neurologic: CNII-XII intact. Weakness. Lab/Data Review: All lab results for the last 24 hours reviewed.      Recent Results (from the past 24 hour(s))   GLUCOSE, POC    Collection Time: 04/29/22 11:32 AM   Result Value Ref Range    Glucose (POC) 232 (H) 65 - 117 mg/dL    Performed by Rita Quiroga W/MICROSCOPIC    Collection Time: 04/29/22  4:20 PM   Result Value Ref Range    Color Yellow/Straw      Appearance Turbid (A) Clear      Specific gravity 1.013 1.003 - 1.030      pH (UA) 7.0 5.0 - 8.0      Protein 100 (A) Negative mg/dL    Glucose 50 (A) Negative mg/dL    Ketone Negative Negative mg/dL    Bilirubin Negative Negative      Blood Negative Negative      Urobilinogen 0.1 0.1 - 1.0 EU/dL    Nitrites Negative Negative      Leukocyte Esterase Large (A) Negative      WBC  0 - 4 /hpf    RBC >100 (H) 0 - 5 /hpf    Bacteria Negative Negative /hpf    Mucus Trace /lpf    PRESUMPTIVE YEAST Present      Other Urine Micro Present     GLUCOSE, POC    Collection Time: 04/29/22  6:03 PM   Result Value Ref Range    Glucose (POC) 220 (H) 65 - 117 mg/dL    Performed by Danisha Prasad    GLUCOSE, POC    Collection Time: 04/29/22  8:33 PM   Result Value Ref Range    Glucose (POC) 239 (H) 65 - 117 mg/dL    Performed by Paola Baca, POC    Collection Time: 04/30/22 12:02 AM   Result Value Ref Range    Glucose (POC) 237 (H) 65 - 117 mg/dL    Performed by Laisha Ha 10, POC    Collection Time: 04/30/22  6:36 AM   Result Value Ref Range    Glucose (POC) 202 (H) 65 - 117 mg/dL    Performed by Donna 69          Assessment:     Active Problems:    Hyperkalemia (4/4/2022)      UTI (urinary tract infection) (4/4/2022)      Sepsis ([facility] [address] (4/4/2022)      RICARDO (acute kidney injury) ([facility] [address] (4/4/2022)      AMS (altered mental status) (4/4/2022)        Plan:     Skilled nursing placement pending    Signed By: Rambo Jaime MD     April 30, 2022

## 2022-05-01 NOTE — PROGRESS NOTES
Progress Note    Patient: Navya Shrestha MRN: [id number]  SSN: [id number]    YOB: 1945  Age: 78 y.o. Sex: female      Admit Date: 4/3/2022    LOS: 27 days     Subjective:   Patient followed for sepsis with now presumptive sacral wound infection secondary to E. Coli and Pseudomonas, now status post debridement. Still on Fluconazole for Candida UTI. Patient is awake today and appears to be resting comfortably. She offered no complaints. She is afebrile with slight increase in WBC today. Still pending disposition to SNF. Daughter at bedside indicated that she has appealed her mother's discharge twice, because she is concerned about her WBC being elevated (apparently when she left [facility] it was also elevated). I explained that even if she is discharged to SNF, we will continue to follow her labs weekly. Objective:     Vitals:    04/30/22 2201 04/30/22 2321 05/01/22 0123 05/01/22 0815   BP: (!) 149/58 (!) 140/63 (!) 148/58 136/72   Pulse: 89 76 78 78   Resp: 18 17  18   Temp:  98.4 °F (36.9 °C)  99.2 °F (37.3 °C)   SpO2:  99%  97%   Weight:       Height:            Intake and Output:  Current Shift: No intake/output data recorded. Last three shifts: 04/29 1901 - 05/01 0700  In: 60   Out: 1000 [Urine:1000]    Physical Exam:   Vitals and nursing note reviewed. Constitutional:       General: She is not in acute distress. Appearance: She is ill-appearing. HENT:   Room Air   Cardiovascular:      Rate and Rhythm: Normal rate and regular rhythm. Heart sounds: No murmur heard. Pulmonary:      Effort: Pulmonary effort is normal.      Breath sounds: Normal breath sounds. Abdominal:      General: Bowel sounds are normal.      Palpations: Abdomen is soft. Tenderness: There is no abdominal tenderness.       Comments: PEG tube in place, site unremarkable  Genitourinary:     Comments: Bell catheter still with cloudy urine  Flexiseal with empty bag at this time  Musculoskeletal: Right lower leg: No edema. Left lower leg: No edema. Comments: Left foot surgical site is dry  Skin: Stage 3 sacral wound with granulating base     Findings: No rash. Neurological: awake and responsive  Psychiatric: normal behavior     Lab/Data Review:     WBC 12,900  UA with WBC >100, budding yeasts    Procal 0.71 <0.77 <0.80 <0.56 <0.59 <1.47 < 2.91 <4.99 <5.34   CRP 15.00 <13.20 <11.20 <10.20 <10.10     Blood cultures (4/3) No growth FINAL  Blood cultures (4/10)  No growth FINAL   Urine culture (4/7) >100,000 cfu/ml Pseudomonas aeruginosa   Urine culture (4/14) >100,000 cfu/ml mixed urogenital yasmine   Wound culture sacrum (4/20) E. Coli and Pseudomonas aeruginosa FINAL  Tissue culture sacrum (4/20) Coaugulase negative staphylococci consistent with colonization FINAL   Left foot tissue (4/20) Coagulase negative Staphylococcus species FINAL  Wound culture sacrum (4/29) 1+ Gram negative rods    CXR (4/11) No acute pulmonary process    Assessment:     Active Problems:    Hyperkalemia (4/4/2022)      UTI (urinary tract infection) (4/4/2022)      Sepsis ([address] (4/4/2022)      RICARDO (acute kidney injury) ([facility] [address] (4/4/2022)      AMS (altered mental status) (4/4/2022)    1. Sepsis with fever and leukocytosis, elevated procal and CRP, resolving  2. UTI with marked pyuria and bacteriuria, secondary to Pseudomonas aeruginosa, status post 14 days IV Zosyn   3. Altered mentals status, multifactorial including sepsis  4. Uncontrolled diabetes mellitus with hyperglycemia  5. Chronic maxillary sinusitis  6. ASCVD with prior stroke  7. Hepatitis C antibody positive, HCV RNA negative  8. 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 Coli and Pseudomonas aeruginosa, Day #10 IV Meropenem  10. Acute hypoxic respiratory failure, resolved  11. Diarrhea, presumed secondary to tube feeding  12.  Candida UTI, presumptive, with persistent pyuria and yeasts, status post Fluconazole, therapeutic failure, Day #2 oral Flucytosine    Comment:  WBC still increasing again along with CRP. Gram negative rods still in wound culture. Plan:   1. Continue Flucytosine 500 mg po Q6 hours (may be forced to discontinue at discharge because of cost)  2. Continue Meropenem IV for 2 more weeks    3. Follow-up repeat sacral wound culture to rule out resistant Gram negative rods.       Signed By: Jake Blount MD     May 1, 2022

## 2022-05-01 NOTE — PROGRESS NOTES
Problem: Pressure Injury - Risk of  Goal: *Prevention of pressure injury  Description: Document Shakir Scale and appropriate interventions in the flowsheet. Outcome: Progressing Towards Goal  Note: Pressure Injury Interventions:  Sensory Interventions: Assess changes in LOC,Float heels,Minimize linen layers,Turn and reposition approx. every two hours (pillows and wedges if needed)    Moisture Interventions: Absorbent underpads,Apply protective barrier, creams and emollients,Check for incontinence Q2 hours and as needed,Contain wound drainage,Internal/External urinary devices,Internal/External fecal devices    Activity Interventions: Pressure redistribution bed/mattress(bed type)    Mobility Interventions: Pressure redistribution bed/mattress (bed type)    Nutrition Interventions: Document food/fluid/supplement intake,Discuss nutritional consult with provider,Offer support with meals,snacks and hydration    Friction and Shear Interventions: HOB 30 degrees or less                Problem: Falls - Risk of  Goal: *Absence of Falls  Description: Document Maryann Fall Risk and appropriate interventions in the flowsheet.   Outcome: Progressing Towards Goal  Note: Fall Risk Interventions:  Mobility Interventions: Bed/chair exit alarm    Mentation Interventions: Bed/chair exit alarm,Reorient patient    Medication Interventions: Bed/chair exit alarm    Elimination Interventions: Bed/chair exit alarm,Call light in reach

## 2022-05-01 NOTE — PROGRESS NOTES
Progress Note    Patient: Jean Lr MRN: [id number]  SSN: [id number]    YOB: 1945  Age: 78 y.o. Sex: female      Admit Date: 4/3/2022    LOS: 27 days     Subjective:     Lying in bed no new complaints    Objective:     Vitals:    04/30/22 2201 04/30/22 2321 05/01/22 0123 05/01/22 0815   BP: (!) 149/58 (!) 140/63 (!) 148/58 136/72   Pulse: 89 76 78 78   Resp: 18 17  18   Temp:  98.4 °F (36.9 °C)  99.2 °F (37.3 °C)   SpO2:  99%  97%   Weight:       Height:            Intake and Output:  Current Shift: No intake/output data recorded. Last three shifts: 04/29 1901 - 05/01 0700  In: 60   Out: 1000 [Urine:1000]    Physical Exam:   General:  Alert, cooperative, no distress, appears stated age. Eyes:  Conjunctivae/corneas clear. PERRL, EOMs intact. Fundi benign   Ears:  Normal TMs and external ear canals both ears. Nose: Nares normal. Septum midline. Mucosa normal. No drainage or sinus tenderness. Mouth/Throat: Lips, mucosa, and tongue normal. Teeth and gums normal.   Neck: Supple, symmetrical, trachea midline, no adenopathy, thyroid: no enlargment/tenderness/nodules, no carotid bruit and no JVD. Back:   Symmetric, no curvature. ROM normal. No CVA tenderness. Lungs:   Clear to auscultation bilaterally. Heart:  Regular rate and rhythm, S1, S2 normal, no murmur, click, rub or gallop. Abdomen:   Soft, non-tender. Bowel sounds normal. No masses,  No organomegaly. Extremities: Extremities    Pulses: 2+ and symmetric all extremities. Skin:  Decubitus ulcer   Lymph nodes: Cervical, supraclavicular, and axillary nodes normal.   Neurologic: CNII-XII intact. Bedbound       Lab/Data Review: All lab results for the last 24 hours reviewed.      Recent Results (from the past 24 hour(s))   GLUCOSE, POC    Collection Time: 04/30/22  6:28 PM   Result Value Ref Range    Glucose (POC) 230 (H) 65 - 117 mg/dL    Performed by [id number], POC    Collection Time: 04/30/22  8:35 PM Result Value Ref Range    Glucose (POC) 227 (H) 65 - 117 mg/dL    Performed by Susan Godwin, POC    Collection Time: 04/30/22  9:55 PM   Result Value Ref Range    Glucose (POC) 227 (H) 65 - 117 mg/dL    Performed by Leonie Ramila    GLUCOSE, POC    Collection Time: 05/01/22  1:23 AM   Result Value Ref Range    Glucose (POC) 244 (H) 65 - 117 mg/dL    Performed by [facility]    GLUCOSE, POC    Collection Time: 05/01/22  6:25 AM   Result Value Ref Range    Glucose (POC) 100 65 - 117 mg/dL    Performed by [facility]    C REACTIVE PROTEIN, QT    Collection Time: 05/01/22  7:22 AM   Result Value Ref Range    C-Reactive protein 15.00 (H) 0.00 - 0.60 mg/dL   PROCALCITONIN    Collection Time: 05/01/22  7:22 AM   Result Value Ref Range    Procalcitonin 0.71 (H) 0 ng/mL   CBC WITH AUTOMATED DIFF    Collection Time: 05/01/22  7:22 AM   Result Value Ref Range    WBC 12.9 (H) 3.6 - 11.0 K/uL    RBC 3.19 (L) 3.80 - 5.20 M/uL    HGB 9.1 (L) 11.5 - 16.0 g/dL    HCT 28.4 (L) 35.0 - 47.0 %    MCV 89.0 80.0 - 99.0 FL    MCH 28.5 26.0 - 34.0 PG    MCHC 32.0 30.0 - 36.5 g/dL    RDW 16.3 (H) 11.5 - 14.5 %    PLATELET 632 537 - 127 K/uL    MPV 12.0 8.9 - 12.9 FL    NRBC 0.0 0.0  WBC    ABSOLUTE NRBC 0.00 0.00 - 0.01 K/uL    NEUTROPHILS 70 32 - 75 %    LYMPHOCYTES 18 12 - 49 %    MONOCYTES 8 5 - 13 %    EOSINOPHILS 2 0 - 7 %    BASOPHILS 1 0 - 1 %    IMMATURE GRANULOCYTES 1 (H) 0 - 0.5 %    ABS. NEUTROPHILS 9.2 (H) 1.8 - 8.0 K/UL    ABS. LYMPHOCYTES 2.3 0.8 - 3.5 K/UL    ABS. MONOCYTES 1.0 0.0 - 1.0 K/UL    ABS. EOSINOPHILS 0.3 0.0 - 0.4 K/UL    ABS. BASOPHILS 0.1 0.0 - 0.1 K/UL    ABS. IMM.  GRANS. 0.1 (H) 0.00 - 0.04 K/UL    DF AUTOMATED     GLUCOSE, POC    Collection Time: 05/01/22 11:46 AM   Result Value Ref Range    Glucose (POC) 268 (H) 65 - 117 mg/dL    Performed by "[employer]          Assessment:     Active Problems:    Hyperkalemia (4/4/2022)      UTI (urinary tract infection) (4/4/2022)      Sepsis ([facility] [address] (4/4/2022)      RICARDO (acute kidney injury) ([facility] [address] (4/4/2022)      AMS (altered mental status) (4/4/2022)        Plan:     Placement pending continue with routine care    Signed By: Radha Richey MD     May 1, 2022

## 2022-05-01 NOTE — PROGRESS NOTES
Telemetry room called to report patient converted from Normal sinus rhythm to flutter. EKG ordered to confirm. MD notified.

## 2022-05-02 NOTE — PROGRESS NOTES
Progress Note    Patient: Navya Shrestha MRN: [id number]  SSN: [id number]    YOB: 1945  Age: 78 y.o. Sex: female      Admit Date: 4/3/2022    LOS: 28 days     Subjective:   Patient followed for sepsis with now presumptive sacral wound infection secondary to E. Coli and Pseudomonas, now status post debridement. Still on Fluconazole for Candida UTI. Patient is awake today and appears to be resting comfortably. She offered no complaints. She is afebrile with decreasing WBC. Still pending disposition to SNF. Family members appear to want her to have a normal WBC prior to discharge. Explained that this was not necessary. Objective:     Vitals:    05/02/22 0005 05/02/22 0204 05/02/22 0719 05/02/22 1221   BP: (!) 142/58 (!) 131/54 (!) 150/61    Pulse: 77 78 76    Resp:  19 17    Temp:  99.9 °F (37.7 °C) 98.9 °F (37.2 °C)    SpO2:  95% 99%    Weight:       Height:    5' 7.99\" (1.727 m)        Intake and Output:  Current Shift: No intake/output data recorded. Last three shifts: 04/30 1901 - 05/02 0700  In: 660   Out: 3500 [Urine:3500]    Physical Exam:   Vitals and nursing note reviewed. Constitutional:       General: She is not in acute distress. Appearance: She is ill-appearing. HENT:   Room Air   Cardiovascular:      Rate and Rhythm: Normal rate and regular rhythm. Heart sounds: No murmur heard. Pulmonary:      Effort: Pulmonary effort is normal.      Breath sounds: Normal breath sounds. Abdominal:      General: Bowel sounds are normal.      Palpations: Abdomen is soft. Tenderness: There is no abdominal tenderness. Comments: PEG tube in place, site unremarkable  Genitourinary:     Comments: Bell catheter still with cloudy urine  Flexiseal with empty bag at this time  Musculoskeletal:      Right lower leg: No edema. Left lower leg: No edema. Comments: Left foot surgical site is dry  Skin: Stage 3 sacral wound with granulating base     Findings: No rash. Neurological: awake and responsive  Psychiatric: normal behavior     Lab/Data Review:     WBC 11,900  UA with WBC >100, budding yeasts    Procal 0.54 <0.71 <0.77 <0.80 <0.56 <0.59 <1.47 < 2.91 <4.99 <5.34   CRP 15.70 < 15.00 <13.20 <11.20 <10.20 <10.10     Blood cultures (4/3) No growth FINAL  Blood cultures (4/10)  No growth FINAL   Urine culture (4/7) >100,000 cfu/ml Pseudomonas aeruginosa   Urine culture (4/14) >100,000 cfu/ml mixed urogenital yasmine   Wound culture sacrum (4/20) E. Coli and Pseudomonas aeruginosa FINAL  Tissue culture sacrum (4/20) Coaugulase negative staphylococci consistent with colonization FINAL   Left foot tissue (4/20) Coagulase negative Staphylococcus species FINAL  Wound culture sacrum (4/29)  Moderate Pseudomonas aruginosa susceptibility to Meropenem and Zosyn pending    CXR (4/11) No acute pulmonary process    Assessment:     Active Problems:    Hyperkalemia (4/4/2022)      UTI (urinary tract infection) (4/4/2022)      Sepsis ([address] (4/4/2022)      RICARDO (acute kidney injury) ([facility] [address] (4/4/2022)      AMS (altered mental status) (4/4/2022)    1. Sepsis with fever and leukocytosis, elevated procal and CRP  2. UTI with marked pyuria and bacteriuria, secondary to Pseudomonas aeruginosa, status post 14 days IV Zosyn   3. Altered mentals status, multifactorial including sepsis  4. Uncontrolled diabetes mellitus with hyperglycemia  5. Chronic maxillary sinusitis  6. ASCVD with prior stroke  7. Hepatitis C antibody positive, HCV RNA negative  8. 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 Coli and Pseudomonas aeruginosa, Day #11 IV Meropenem  10. Acute hypoxic respiratory failure, resolved  11. Diarrhea, presumed secondary to tube feeding  12.  Candida UTI, presumptive, with persistent pyuria and yeasts, status post Fluconazole, therapeutic failure, Day #3 oral Flucytosine    Comment:  WBC decreasing today but unclear if this trend will continue. Hopefully if her WBC normalizes, family will feel more comfortable with her discharge. Pseudomonas again isolated from sacral wound, need to determine whether still sensitive to Meropenem or Zosyn. Plan:   1. Continue Flucytosine 500 mg po Q6 hours (may be forced to discontinue at discharge because of cost)  2. Continue Meropenem IV for 2 more weeks    3. Follow-up repeat sacral wound culture to rule out resistant Pseudomonas.       Signed By: Carlotta Steele MD     May 2, 2022

## 2022-05-02 NOTE — PROGRESS NOTES
Progress Note    Patient: Shorty Duong MRN: [id number]  SSN: [id number]    YOB: 1945  Age: 78 y.o. Sex: female      Admit Date: 4/3/2022    LOS: 28 days     Subjective:     78years old history of multiple strokes in the past including pontine CVA admitted for sepsis UTI history of PAD with dry gangrene with stage III decubitus ulcer bedbound lying comfortably no cough  Has diarrhea with rectal tube for decubitus ulcer    Objective:     Vitals:    05/02/22 0204 05/02/22 0719 05/02/22 1221 05/02/22 1542   BP: (!) 131/54 (!) 150/61  (!) 123/47   Pulse: 78 76  92   Resp: 19 17  18   Temp: 99.9 °F (37.7 °C) 98.9 °F (37.2 °C)  99.4 °F (37.4 °C)   SpO2: 95% 99%  97%   Weight:       Height:   5' 7.99\" (1.727 m)         Intake and Output:  Current Shift: No intake/output data recorded. Last three shifts: 04/30 1901 - 05/02 0700  In: 660   Out: 3500 [Urine:3500]    Physical Exam:   General:  Alert, cooperative, no distress, appears stated age. Eyes:  Conjunctivae/corneas clear. PERRL, EOMs intact. Fundi benign   Ears:  Normal TMs and external ear canals both ears. Nose: Nares normal. Septum midline. Mucosa normal. No drainage or sinus tenderness. Mouth/Throat: Lips, mucosa, and tongue normal. Teeth and gums normal.   Neck: Supple, symmetrical, trachea midline, no adenopathy, thyroid: no enlargment/tenderness/nodules, no carotid bruit and no JVD. Back:   Symmetric, no curvature. ROM normal. No CVA tenderness. Lungs:   Clear to auscultation bilaterally. Heart:  Regular rate and rhythm, S1, S2 normal, no murmur, click, rub or gallop. Abdomen:   Soft, non-tender. Bowel sounds normal. No masses,  No organomegaly. Extremities: Extremities weakness . Pulses: 2+ and symmetric all extremities. Skin: Skin color, texture, turgor normal. No rashes or lesions   Lymph nodes: Cervical, supraclavicular, and axillary nodes normal.   Neurologic: CNII-XII intact. Weakness.        Adah Dinning Review: All lab results for the last 24 hours reviewed. Recent Results (from the past 24 hour(s))   GLUCOSE, POC    Collection Time: 05/01/22  6:55 PM   Result Value Ref Range    Glucose (POC) 167 (H) 65 - 117 mg/dL    Performed by Janneth Garcia    GLUCOSE, POC    Collection Time: 05/01/22  9:22 PM   Result Value Ref Range    Glucose (POC) 176 (H) 65 - 117 mg/dL    Performed by Reinier Dodd    GLUCOSE, POC    Collection Time: 05/02/22  1:51 AM   Result Value Ref Range    Glucose (POC) 183 (H) 65 - 117 mg/dL    Performed by Evaristo Duncan    GLUCOSE, POC    Collection Time: 05/02/22  5:59 AM   Result Value Ref Range    Glucose (POC) 220 (H) 65 - 117 mg/dL    Performed by Evaristo Duncan    CBC WITH AUTOMATED DIFF    Collection Time: 05/02/22  7:19 AM   Result Value Ref Range    WBC 11.9 (H) 3.6 - 11.0 K/uL    RBC 2.98 (L) 3.80 - 5.20 M/uL    HGB 8.4 (L) 11.5 - 16.0 g/dL    HCT 25.7 (L) 35.0 - 47.0 %    MCV 86.2 80.0 - 99.0 FL    MCH 28.2 26.0 - 34.0 PG    MCHC 32.7 30.0 - 36.5 g/dL    RDW 16.1 (H) 11.5 - 14.5 %    PLATELET 990 766 - 460 K/uL    MPV 10.8 8.9 - 12.9 FL    NRBC 0.0 0.0  WBC    ABSOLUTE NRBC 0.00 0.00 - 0.01 K/uL    NEUTROPHILS 69 32 - 75 %    LYMPHOCYTES 19 12 - 49 %    MONOCYTES 7 5 - 13 %    EOSINOPHILS 3 0 - 7 %    BASOPHILS 1 0 - 1 %    IMMATURE GRANULOCYTES 1 (H) 0 - 0.5 %    ABS. NEUTROPHILS 8.3 (H) 1.8 - 8.0 K/UL    ABS. LYMPHOCYTES 2.3 0.8 - 3.5 K/UL    ABS. MONOCYTES 0.8 0.0 - 1.0 K/UL    ABS. EOSINOPHILS 0.3 0.0 - 0.4 K/UL    ABS. BASOPHILS 0.1 0.0 - 0.1 K/UL    ABS. IMM.  GRANS. 0.1 (H) 0.00 - 0.04 K/UL    DF AUTOMATED     C REACTIVE PROTEIN, QT    Collection Time: 05/02/22  7:19 AM   Result Value Ref Range    C-Reactive protein 15.70 (H) 0.00 - 0.60 mg/dL   PROCALCITONIN    Collection Time: 05/02/22  7:19 AM   Result Value Ref Range    Procalcitonin 0.54 (H) 0 ng/mL   GLUCOSE, POC    Collection Time: 05/02/22  7:23 AM   Result Value Ref Range    Glucose (POC) 253 (H) 65 - 117 mg/dL    Performed by 70Ishan Adair, POC    Collection Time: 05/02/22 11:15 AM   Result Value Ref Range    Glucose (POC) 275 (H) 65 - 117 mg/dL    Performed by Rose Diaz, POC    Collection Time: 05/02/22  5:58 PM   Result Value Ref Range    Glucose (POC) 272 (H) 65 - 117 mg/dL    Performed by Татьяна Burr          Assessment:     Active Problems:    Hyperkalemia (4/4/2022)      UTI (urinary tract infection) (4/4/2022)      Sepsis ([facility] [address] (4/4/2022)      RICARDO (acute kidney injury) ([address] (4/4/2022)      AMS (altered mental status) (4/4/2022)    diarrhea questran/imodium    Plan:     Skilled nursing placement pending    Signed By: Radha Richey MD     May 2, 2022

## 2022-05-02 NOTE — PROGRESS NOTES
Problem: Falls - Risk of  Goal: *Absence of Falls  Description: Document Jacquelyn Escobar Fall Risk and appropriate interventions in the flowsheet.   Outcome: Progressing Towards Goal  Note: Fall Risk Interventions:  Mobility Interventions: Bed/chair exit alarm    Mentation Interventions: Adequate sleep, hydration, pain control,Bed/chair exit alarm,Evaluate medications/consider consulting pharmacy,More frequent rounding    Medication Interventions: Bed/chair exit alarm    Elimination Interventions: Call light in reach,Toileting schedule/hourly rounds

## 2022-05-02 NOTE — PROGRESS NOTES
SHIRLEY reached out to [facility] at [phone] regarding status of second level appeal of discharge. SHIRLEY spoke with representative Elly and was informed the determination was made that they agree with the hospital of patient being discharged. They reported patient liability starts on 4/29 and they notified patient's daughter, Rommel Montanez, of this on Saturday 4/30. SHIRLEY inquired about a fax informing us of this and was informed their phone/fax machine was delayed and we would likely get a fax sometime this week. SHIRLEY relayed the above information to the patient's daughter Rommel Montanez, at [phone]. She asked about the cost of patient remaining hospitalized. CM discussed this and informed her if she wanted the patient to remain hospitalized we would issue her a HINN letter. Patient's daughter though hesitant of discharge is agreeable to [facility]. SHIRLEY reached out to [facility] and they need clarification of the wound vac orders as Dr. Shell Erickson stated in his note on 4/22 he wanted two wound vacs placed once patient was discharged to the SNF. [facility] [employer] [facility] also cannot accommodate a rectal tube and that has to be discontinued prior to discharge. SHIRLEY reached out to Dr. Shell Erickson and wound care and they are re-assessing the patient's wounds and will decide about the wound vac needs. He stated he definitely wants one on the left foot and will wait to hear from wound care about the sacral wound. SHIRLEY then reached out to Dr. Srinivas Carranza regarding the rectal tube being discontinued. He stated that he did not want it to be dc'd due to the patient still having loose stools. He plans to order some medication to help with this and will reassess tomorrow. Patient cannot discharge to [facility] until the rectal tube has been discontinued. CM updated patient's daughter and patient's nurse.

## 2022-05-02 NOTE — WOUND CARE
Follow up wound care visit for sacral wound. Wound bed has slough with some pink/red areas surrounding it. Small amount of drainage noted during dressing change. Wound lightly packed with moistened gauze and covered with ABD pads and tape. Underpad changed and patient repositioned onto right side with wedge. Spoke with Dr. Leti Brown to confirm  NPWT placement at [facility], will put in order. NPWT to be applied at [facility] when patient discharges. Continue with daily wet to dry dressing changes until discharged.

## 2022-05-02 NOTE — PROGRESS NOTES
SPEECH LANGUAGE PATHOLOGY DYSPHAGIA TREATMENT  Patient: Raissa Husain (89 y.o. female)  Date: 5/2/2022  Diagnosis: Sepsis ([facility] [address] [A41.9]  UTI (urinary tract infection) [N39.0]  AMS (altered mental status) [R41.82]  RICARDO (acute kidney injury) ([facility] [address] [N17.9]  Hyperkalemia [E87.5] <principal problem not specified>  Procedure(s) (LRB):  SACRAL DEBRIDEMENT (N/A) 11 Days Post-Op  Precautions: aspiration      ASSESSMENT:  Dysphagia and oral motor apraxia continues. Patient responding appropriately w/ a delay and low vocal volume. Reduced bolus control w/ mildly thick trials and slow oral transit for puree ( applesauce ) trials. Swallow delay of 5-10 seconds. Trace labial and oral residual after the swallow. No clinical indicators of penetration aspiration. Attempted oral motor exercises. Lingual protrusion - patient unable to perform   Labial seal- x 10. Use of spoon and verbal cues for patient to create strong lip seal around spoon. Pucker w/ resistance x3- patient did not complete after 3 efforts. Due to decreased bolus control and rapid swallow. Patient remains at risk for aspiration. PLAN:  Recommendations and Planned Interventions:  NPO w/ TF via PEG - ice chips ok for primary means. Continue therapeutic trials of full mildly thick w/ SLP. Oral motor exercises. Patient continues to benefit from skilled intervention to address the above impairments. Continue treatment per established plan of care. Frequency/Duration: Patient will be followed by speech-language pathology 4 times a week to address goals. Discharge Recommendations:  Skilled Nursing Facility     SUBJECTIVE:   Patient alert and seen at bedside. Daughter present in room.      OBJECTIVE:     CXR Results  (Last 48 hours)      None          CT Results  (Last 48 hours)      None           Cognitive and Communication Status:  Neurologic State: Alert  Orientation Level: Disoriented to place,Disoriented to time  Cognition: Follows commands          After treatment:   Patient left in no apparent distress in bed, Call bell within reach, and Caregiver / family present    COMMUNICATION/EDUCATION:   Patient was educated regarding her deficit(s) of dysphagia, swallow safety precautions, diet recs and POC. She demonstrated Fair understanding as evidenced by verbal understanding. The patient's plan of care including recommendations, planned interventions, and recommended diet changes were discussed with: PhysicianCrystal Mandujano, SLP M.S. CCC-SLP  Time Calculation: 10 mins           Problem: Dysphagia (Adult)  Goal: *Acute Goals and Plan of Care (Insert Text)  Description: Speech Therapy Swallow Goals  Initiated 4/22/2022        -Patient will tolerate PO trials without clinical indicators of aspiration given moderate cues within 5-7day(s). -Patient will participate in modified barium swallow study within 5-7 day(s). [MET]      -Patient will demonstrate understanding of swallow safety precautions and aspiration precautions, diet recs with moderate cues within 5-7 day(s).     -Patient will participate in speech-language evaluation as indicated within 5-7 days    -Patient will tolerate Full, mildly thick trials w/o clinical indicators of aspiration and improved bolus oral clearance/management within 5-7 days  Outcome: Progressing Towards Goal

## 2022-05-02 NOTE — PROGRESS NOTES
Problem: Diabetes Self-Management  Goal: *Disease process and treatment process  Description: Define diabetes and identify own type of diabetes; list 3 options for treating diabetes. Outcome: Progressing Towards Goal  Goal: *Incorporating nutritional management into lifestyle  Description: Describe effect of type, amount and timing of food on blood glucose; list 3 methods for planning meals. Outcome: Progressing Towards Goal  Goal: *Incorporating physical activity into lifestyle  Description: State effect of exercise on blood glucose levels. Outcome: Progressing Towards Goal  Goal: *Developing strategies to promote health/change behavior  Description: Define the ABC's of diabetes; identify appropriate screenings, schedule and personal plan for screenings. Outcome: Progressing Towards Goal  Goal: *Using medications safely  Description: State effect of diabetes medications on diabetes; name diabetes medication taking, action and side effects. Outcome: Progressing Towards Goal  Goal: *Monitoring blood glucose, interpreting and using results  Description: Identify recommended blood glucose targets  and personal targets. Outcome: Progressing Towards Goal  Goal: *Prevention, detection, treatment of acute complications  Description: List symptoms of hyper- and hypoglycemia; describe how to treat low blood sugar and actions for lowering  high blood glucose level. Outcome: Progressing Towards Goal  Goal: *Prevention, detection and treatment of chronic complications  Description: Define the natural course of diabetes and describe the relationship of blood glucose levels to long term complications of diabetes.   Outcome: Progressing Towards Goal  Goal: *Developing strategies to address psychosocial issues  Description: Describe feelings about living with diabetes; identify support needed and support network  Outcome: Progressing Towards Goal  Goal: *Insulin pump training  Outcome: Progressing Towards Goal  Goal: *Sick day guidelines  Outcome: Progressing Towards Goal  Goal: *Patient Specific Goal (EDIT GOAL, INSERT TEXT)  Outcome: Progressing Towards Goal     Problem: Patient Education: Go to Patient Education Activity  Goal: Patient/Family Education  Outcome: Progressing Towards Goal     Problem: Pressure Injury - Risk of  Goal: *Prevention of pressure injury  Description: Document Shakir Scale and appropriate interventions in the flowsheet. Outcome: Progressing Towards Goal  Note: Pressure Injury Interventions:  Sensory Interventions: Assess changes in LOC,Assess need for specialty bed    Moisture Interventions: Absorbent underpads,Apply protective barrier, creams and emollients    Activity Interventions: Pressure redistribution bed/mattress(bed type)    Mobility Interventions: Assess need for specialty bed,Turn and reposition approx. every two hours(pillow and wedges)    Nutrition Interventions: Document food/fluid/supplement intake    Friction and Shear Interventions: Apply protective barrier, creams and emollients,Feet elevated on foot rest                Problem: Patient Education: Go to Patient Education Activity  Goal: Patient/Family Education  Outcome: Progressing Towards Goal     Problem: Falls - Risk of  Goal: *Absence of Falls  Description: Document Maryann Fall Risk and appropriate interventions in the flowsheet.   Outcome: Progressing Towards Goal  Note: Fall Risk Interventions:  Mobility Interventions: Bed/chair exit alarm    Mentation Interventions: Adequate sleep, hydration, pain control    Medication Interventions: Bed/chair exit alarm    Elimination Interventions: Bed/chair exit alarm              Problem: Patient Education: Go to Patient Education Activity  Goal: Patient/Family Education  Outcome: Progressing Towards Goal     Problem: Impaired Skin Integrity/Pressure Injury Treatment  Goal: *Improvement of Existing Pressure Injury  Outcome: Progressing Towards Goal  Goal: *Prevention of pressure injury  Description: Document Shakir Scale and appropriate interventions in the flowsheet. Outcome: Progressing Towards Goal  Note: Pressure Injury Interventions:  Sensory Interventions: Assess changes in LOC,Assess need for specialty bed    Moisture Interventions: Absorbent underpads,Apply protective barrier, creams and emollients    Activity Interventions: Pressure redistribution bed/mattress(bed type)    Mobility Interventions: Assess need for specialty bed,Turn and reposition approx.  every two hours(pillow and wedges)    Nutrition Interventions: Document food/fluid/supplement intake    Friction and Shear Interventions: Apply protective barrier, creams and emollients,Feet elevated on foot rest                Problem: Patient Education: Go to Patient Education Activity  Goal: Patient/Family Education  Outcome: Progressing Towards Goal     Problem: Patient Education: Go to Patient Education Activity  Goal: Patient/Family Education  Outcome: Progressing Towards Goal

## 2022-05-02 NOTE — PROGRESS NOTES
Comprehensive Nutrition Assessment    Type and Reason for Visit: Reassess (Goal, PEG.)    Nutrition Recommendations/Plan:   1. NPO, advance when rec'd by SLP  2. Continue TF via PEG as Glucerna 1.5 Mahad at goal rate 60 mL/hr. 3. Flush 200 mL H2O Q4H via PEG. Provides 2160 kcal(100%), 119 gm PRO(105%), 2352 mL H20(97%). 4. Prosource 1 pkt x1/d via PEG. Provides 60 kcal(100%), 15 gm PRO(103%). Nutrition sign off; NST stable x3 weeks w/ EENs met via TF and ONS. Please consult nutrition services as needed. Malnutrition Assessment:  Malnutrition Status:  No malnutrition (04/21/22 1328)   Context:  Chronic illness       Nutrition Assessment:    Admitted for AMS 2/2 UTI. PEG in place, TF started (4/4). Hypertensive and tachypnic this AM, transferred to ICU. Per ICU RN, TF held all day d/t concern for aspiration pna aeb fever (102.2F Tmax) though CXR shows lungs are clear. Discussed restarting TF as able, no changes in regimen. Noted that TF not advanced past 50ml/hr while pt on medical unit, unsure why. (4/11)Pt on medical floor s/p transfer from ICU today; TF to resume s/p meds admin per Nsg. Noted water flushes increased to 350 mL Q4H w/o sig. change in lytes and TF remains as Glucerna and SSI; consider Diabetes Insipidus vs dehydration. Observed on IVF(D5)? Will adjust flush and monitor lytes. (4/14)TF running as Gluc 1.5 at goal rate w/o report of intolerance. TF continues to meet >80% of EENs. Consistent elevated BGs trended- D5 noted to be increased? Discussed w/ Nephro, SSI to be adjusted for BG management. RD rec's continuing regimen, manage BGs w/ SSI. (4/18) PEG noted to be displaced during care, plan to replace today. (4/21) PEG replaced, feeds resumed, no issues tolerating. Underwent L great toe and 2nd toe amputation today. Possible plan for eventual colostomy once pt can tolerate anesthesia. Will provide bolus rec's for discharge. (4/25)Pt in room w/ daughter and RN.  TF running at goal rate and reported as tolerated w/ Prosource pkt. SLP noted trail of Full, Mildly Thick today. Continue TF, adjust when appropriate for increased PO. (5/2)TF running at goal rate as ordered w/o nutrition concerns per Nsg. SLP trial w/ Full/Mildly Thick Liq diet ongoing- remains NPO. Continue regimen. Labs: H/H 8.4/25.7, POC -268 mg/dL. Meds: FerrouSul, lantus, humalog, lopressor, merrem, hydralazine, heparin, lipitor, norvasc. Nutrition Related Findings:    No N/V/D/C nor s/s of aspiration reported. SLP tx continues trial w/ Full/Mildly Thick Liq diet. Last BM 5/1-FSM remain in place. No edema. Wound Type: Deep tissue injury,Unstageable,Moisture associate skin damage,Diabetic ulcer,Surgical incision     Current Nutrition Intake & Therapies:  Average Meal Intake: NPO  Average Supplement Intake: NPO  ADULT TUBE FEEDING PEG; Diabetic; Delivery Method: Continuous; Continuous Initial Rate (mL/hr): 60; Continuous Advance Tube Feeding: No; Water Flush Volume (mL): 200; Water Flush Frequency: Q 4 hours; Modulars/Additives: Protein; Specify How to Ad. .. DIET NPO Ice Chips    Anthropometric Measures:  Height: 5' 7.99\" (172.7 cm)  Ideal Body Weight (IBW): 140 lbs (64 kg)  Admission Body Weight: 178 lb 5.6 oz  Current Body Wt:  86.6 kg (190 lb 14.7 oz), 136.4 % IBW. Bed scale  Current BMI (kg/m2): 29  Usual Body Weight:  (elkin)  Weight Adjustment: No adjustment  BMI Category: Overweight (BMI 25.0-29. 9)    Estimated Daily Nutrient Needs:  Energy Requirements Based On: Kcal/kg  Weight Used for Energy Requirements: Current  Energy (kcal/day): 0299-4528ATVQE (25-30kcals/kg consider bedbound 2/2 CVA and wounds)  Weight Used for Protein Requirements: Current  Protein (g/day): 130g (1.5g/kg - wounds)  Method Used for Fluid Requirements: ml/kg  Fluid (ml/day): 2165mL (25mL/kg)    Nutrition Diagnosis:   · Biting/chewing (masticatory) difficulty,Swallowing difficulty related to cognitive or neurological impairment as evidenced by nutrition support-enteral nutrition    Nutrition Interventions:   Food and/or Nutrient Delivery: Continue NPO,Continue tube feeding  Nutrition Education/Counseling: No recommendations at this time  Coordination of Nutrition Care: No recommendation at this time,Speech therapy  Plan of Care discussed with: RN    Goals:  Previous Goal Met: Goal(s) achieved  Goals: Meet at least 75% of estimated needs,Tolerate nutrition support at goal rate,other (specify)  Specify Other Goals: Wt maintenance within +/-0.5kg in >5 days, Signs of wound healing per nsg assessment in >5 days    Nutrition Monitoring and Evaluation:   Behavioral-Environmental Outcomes: None identified  Food/Nutrient Intake Outcomes: Enteral nutrition intake/tolerance  Physical Signs/Symptoms Outcomes: Biochemical data,Weight,Skin,GI status,Hemodynamic status    Discharge Planning:    Enteral nutrition    Radha Ariza RD  Contact: ext. [phone] or [employer].

## 2022-05-03 NOTE — PROGRESS NOTES
SHIRLEY spoke with Dr. Catia Tyler today and he indicated the patient was cleared from his standpoint and it was okay to d/c the rectal tube. SHIRLEY relayed information to the patient's nurse. The only concern is the patient had temp of 101 during the night. SHIRLEY spoke with ID Dr. Saintclair Aas who stated he wasn't too concerned about the temp and he also spoke with the patient's family regarding cultures and abx. SHIRLEY relayed information and provided wound vac orders to [facility]. They are okay with patient discharging to them once afebrile for 24 hours. SHIRLEY updated the patient's nurse and we will plan discharge tomorrow as long as she is afebrile. SHIRLEY updated the patient's daughter, Blanca Ann, at [phone] and she is agreeable to this plan. DC plan: to [facility] on 5/4.

## 2022-05-03 NOTE — PROGRESS NOTES
Progress Note    Patient: Eli Lagos MRN: [id number]  SSN: [id number]    YOB: 1945  Age: 78 y.o. Sex: female      Admit Date: 4/3/2022    LOS: 29 days     Subjective:   Patient followed for sepsis with now presumptive sacral wound infection secondary to E. Coli and Pseudomonas, now status post debridement. Repeat sacral wound culture again grew Pseudomonas but now resistant to Meropenem. Still on Flucytosine for Candida UTI. She spiked again today with slight increase in WBC, procal and CRP. Family at bedside. Objective:     Vitals:    05/02/22 1542 05/02/22 2031 05/02/22 2357 05/03/22 0807   BP: (!) 123/47 (!) 147/55 (!) 132/53 (!) 141/54   Pulse: 92 88 92 83   Resp: 18 17 16 16   Temp: 99.4 °F (37.4 °C) 100.1 °F (37.8 °C) (!) 101 °F (38.3 °C) 97.8 °F (36.6 °C)   SpO2: 97% 97% 97% 98%   Weight:       Height:            Intake and Output:  Current Shift: No intake/output data recorded. Last three shifts: 05/01 1901 - 05/03 0700  In: 600   Out: 4300 [Urine:3300; Drains:1000]    Physical Exam:   Analia Cantu and nursing note reviewed. Constitutional:       General: She is not in acute distress. Appearance: She is ill-appearing. HENT:   Room Air   Cardiovascular:      Rate and Rhythm: Normal rate and regular rhythm. Heart sounds: No murmur heard. Pulmonary:      Effort: Pulmonary effort is normal.      Breath sounds: Normal breath sounds. Abdominal:      General: Bowel sounds are normal.      Palpations: Abdomen is soft. Tenderness: There is no abdominal tenderness. Comments: PEG tube in place, site unremarkable  Genitourinary:     Comments: Bell catheter still with cloudy urine  Flexiseal with empty bag at this time  Musculoskeletal:      Right lower leg: No edema. Left lower leg: No edema. Comments: Left foot surgical site is dry  Skin: Stage 3 sacral wound with granulating base     Findings: No rash.    Neurological: awake and responsive  Psychiatric: normal behavior     Lab/Data Review:     WBC 12,800  UA with WBC >100, budding yeasts    Procal 0.62 <0.54 <0.71 <0.77 <0.80 <0.56 <0.59 <1.47 < 2.91 <4.99 <5.34   CRP 18.60 <15.70 < 15.00 <13.20 <11.20 <10.20 <10.10     Blood cultures (4/3) No growth FINAL  Blood cultures (4/10)  No growth FINAL   Urine culture (4/7) >100,000 cfu/ml Pseudomonas aeruginosa   Urine culture (4/14) >100,000 cfu/ml mixed urogenital yasmine   Wound culture sacrum (4/20) E. Coli and Pseudomonas aeruginosa FINAL  Tissue culture sacrum (4/20) Coaugulase negative staphylococci consistent with colonization FINAL   Left foot tissue (4/20) Coagulase negative Staphylococcus species FINAL  Wound culture sacrum (4/29)  Moderate Pseudomonas aruginosa resistant to both Meropenem and Zosyn, but sensitive to Ciprofloxacin and Levaquin    CXR (4/11) No acute pulmonary process    Assessment:     Active Problems:    Hyperkalemia (4/4/2022)      UTI (urinary tract infection) (4/4/2022)      Sepsis ([facility] [address] (4/4/2022)      RICARDO (acute kidney injury) ([facility] [address] (4/4/2022)      AMS (altered mental status) (4/4/2022)    1. Sepsis with fever and leukocytosis, elevated procal and CRP  2. UTI with marked pyuria and bacteriuria, secondary to Pseudomonas aeruginosa, status post 14 days IV Zosyn   3. Altered mentals status, multifactorial including sepsis  4. Uncontrolled diabetes mellitus with hyperglycemia  5. Chronic maxillary sinusitis  6. ASCVD with prior stroke  7. Hepatitis C antibody positive, HCV RNA negative  8. 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 Coli and Pseudomonas aeruginosa, Day #11 IV Meropenem  10. Acute hypoxic respiratory failure, resolved  11. Diarrhea, presumed secondary to tube feeding  12.  Candida UTI, presumptive, with persistent pyuria and yeasts, status post Fluconazole, therapeutic failure, Day #5 oral Flucytosine    Comment:  Pseudomonas resistance to Meropenem may explain recurrent fever and increasing WBC. Agree with switching to Ciprofloxacin (or Levaquin) however, these antibiotics have an even lower barrier to resistance than Zosyn and Meropenem. That would then leave us with only the aminoglycosides which are potentially nephrotoxic. Discussed above results with patient and family to explain that we now have a plausible reason for her worsening. Plan:   1. Agree with Ciprofloxacin but would increase to 750 mg every 12 hours (or Levaquin 750 mg daily) for 2 more weeks  2. Discontinue Meropenem since Pseudomonas is resistant  3. Contact [facility] to test Avycaz and Shar Hernandez should she return with quinolone resistant Pseudomonas  4.  Conntinue Flucytosine 500 mg po Q6 hours for 9 more days (may be forced to discontinue at discharge because of cost)    Signed By: Jennifer Rodarte MD     May 3, 2022

## 2022-05-03 NOTE — PROGRESS NOTES
Vancomycin Dosing Consult  Day #1 of vancomycin therapy  Consult ordered by Dr. Heath Landry for this 78 y.o. female for indication of sepsis. Antibiotic regimen: Vancomycin + meropenem    Temp (24hrs), Av.6 °F (37.6 °C), Min:97.8 °F (36.6 °C), Max:101 °F (38.3 °C)    Recent Labs     22  1000 22  0758 22  0719 22  0722   WBC  --  12.8* 11.9* 12.9*   CREA 0.74  --   --   --    BUN 35*  --   --   --      Est CrCl: ~70-75 ml/min; UO: 1.1 mL/kg/hr  Concomitant nephrotoxic drugs: None    Cultures:    Tissue: Moderate Escherichia coli, light Pseudomonas aeruginosa, light CoNS   Wound:  Moderate Pseudomonas aeruginosa, moderate mixed skin yasmine, moderate mixed enteric yasmine    MRSA Swab: Not detected     Target range: AUC/GEMA 400-600       Assessment/Plan:   Febrile 5/2, continued leukocytosis, procal and CRP increased today  SCr stable  Will initiate therapy with a loading dose of 1500 mg followed by a maintenance dose of 750 mg q12h for a predicted AUC of 413  Will check a level tomorrow prior to dose at 1200   Antimicrobial stop date TBD

## 2022-05-03 NOTE — DISCHARGE SUMMARY
Discharge Summary       PATIENT ID: Sammie Burgess  MRN: [id number]   YOB: 1945    DATE OF ADMISSION: 4/3/2022 10:36 PM    DATE OF DISCHARGE:   PRIMARY CARE PROVIDER: Mark Brandt MD     ATTENDING PHYSICIAN: Anayeli Soto  DISCHARGING PROVIDER: Anayeli Soto      CONSULTATIONS: IP CONSULT TO NEPHROLOGY  IP CONSULT TO INFECTIOUS DISEASES  IP CONSULT TO NEPHROLOGY  IP CONSULT TO NEPHROLOGY  IP CONSULT TO GASTROENTEROLOGY  IP CONSULT TO GASTROENTEROLOGY  IP CONSULT TO PULMONOLOGY  IP CONSULT TO GENERAL SURGERY  IP CONSULT TO GENERAL SURGERY    PROCEDURES/SURGERIES: Procedure(s):  SACRAL DEBRIDEMENT    ADMITTING DIAGNOSES:    Patient Active Problem List    Diagnosis Date Noted    Hyperkalemia 04/04/2022    UTI (urinary tract infection) 04/04/2022    Sepsis ([address] 04/04/2022    RICARDO (acute kidney injury) ([address] 04/04/2022    AMS (altered mental status) 04/04/2022    CVA (cerebral vascular accident) ([address] 02/21/2022    Elevated troponin 02/21/2022    Ischemia of both lower extremities 07/22/2020    Pain in both lower legs 07/22/2020    Acute osteomyelitis of ankle or foot ([address] 04/04/2019    Diabetic peripheral neuropathy ([address] 04/04/2019    Spinal stenosis in cervical region 04/04/2019    Type 2 diabetes mellitus with nephropathy ([address] 01/31/2018    DM type 2 causing vascular disease ([address] 07/27/2017    Hypercalcemia 12/12/2016    PAD (peripheral artery disease) ([address] 04/11/2016    Infection of nailbed of toe of left foot 09/10/2015    Diabetes mellitus with neurological manifestations, uncontrolled 08/08/2013    HTN (hypertension)     Hyperlipidemia     Neuropathy     Sciatica        DISCHARGE DIAGNOSES / PLAN:      Severe sepsis with UTI  UTI/Pseudomonas  Acute on chronic kidney disease  Hyperkalemia  Hypernatremia, improving  CVA with right-sided weakness  Anemia of chronic disease  Gangrene of the left great toe  Sacral decubitus ulcer  Acute respiratory failure  Possible aspiration  Elevated BNP  PEG tube malfunction/seen by the GI now working   Gangrene of the left great toe-s/p removal of left great and second toe  Static post debridement of the sacral wound        DISCHARGE MEDICATIONS:  Current Discharge Medication List      START taking these medications    Details   albuterol-ipratropium (DUO-NEB) 2.5 mg-0.5 mg/3 ml nebu 3 mL by Nebulization route every six (6) hours as needed for Wheezing or Shortness of Breath. Qty: 30 Nebule, Refills: 1  Start date: 4/19/2022      hydrALAZINE (APRESOLINE) 50 mg tablet Take 1 Tablet by mouth three (3) times daily. Qty: 60 Tablet, Refills: 0  Start date: 4/19/2022      metoprolol tartrate (LOPRESSOR) 50 mg tablet Take 1 Tablet by mouth two (2) times a day. Qty: 60 Tablet, Refills: 0  Start date: 4/19/2022         CONTINUE these medications which have NOT CHANGED    Details   gabapentin (NEURONTIN) 300 mg capsule Take 1 Capsule by mouth two (2) times a day. Max Daily Amount: 600 mg. Qty: 12 Capsule, Refills: 0    Associated Diagnoses: Pain      insulin glargine (LANTUS) 100 unit/mL injection 50 Units by SubCUTAneous route daily. Qty: 10 mL, Refills: 0      acidophilus-pectin, citrus 25 million cell -100 mg tab tablet Take 2 Tablets by mouth daily. Qty: 30 Tablet, Refills: 0      amLODIPine (NORVASC) 5 mg tablet Take 1 Tablet by mouth daily. Qty: 30 Tablet, Refills: 0      artificial saliva (MOUTH KOTE) spra Take 1 Spray by mouth three (3) times daily. Qty: 10 mL, Refills: 0      latanoprost (XALATAN) 0.005 % ophthalmic solution Administer 1 Drop to right eye every evening. Qty: 10 mL, Refills: 0      atorvastatin (LIPITOR) 20 mg tablet TAKE 1 TABLET BY MOUTH EVERY NIGHT  Qty: 30 Tablet, Refills: 5    Associated Diagnoses: Type II or unspecified type diabetes mellitus with neurological manifestations, uncontrolled(250.62) ([address] Essential hypertension;  Hyperlipidemia, unspecified hyperlipidemia type; Vitamin D deficiency; Vitamin B12 deficiency; Neuropathy; Type 2 diabetes mellitus with hyperglycemia, with long-term current use of insulin ([address] Diabetes mellitus with neurological manifestations, uncontrolled; Mixed hyperlipidemia; PAD (peripheral artery disease) ([address] Hypercalcemia; Uncontrolled type 2 diabetes mellitus with hyperglycemia, with long-term current use of insulin (HCC)      brimonidine (ALPHAGAN) 0.2 % ophthalmic solution Administer 1 Drop to both eyes three (3) times daily. aspirin delayed-release 81 mg tablet Take  by mouth daily. ferrous sulfate 325 mg (65 mg iron) tablet Take 1 Tab by mouth two (2) times a day. Qty: 60 Tab, Refills: 5    Associated Diagnoses: Diabetes mellitus with neurological manifestations, uncontrolled; Essential hypertension; PAD (peripheral artery disease) ([address] Mixed hyperlipidemia; Type 2 diabetes mellitus with hyperglycemia, with long-term current use of insulin ([facility] Hypercalcemia; Uncontrolled type 2 diabetes mellitus with hyperglycemia, with long-term current use of insulin ([facility] Type II or unspecified type diabetes mellitus with neurological manifestations, uncontrolled(250.62) ([address] Hyperlipidemia, unspecified hyperlipidemia type; Vitamin D deficiency; Vitamin B12 deficiency; Neuropathy         STOP taking these medications       fluconazole (DIFLUCAN) 100 mg tablet Comments:   Reason for Stopping:         lidocaine (XYLOCAINE) 4 % (40 mg/mL) topical solution Comments:   Reason for Stopping:         metoprolol succinate (TOPROL-XL) 50 mg XL tablet Comments:   Reason for Stopping:                 NOTIFY YOUR PHYSICIAN FOR ANY OF THE FOLLOWING:   Fever over 101 degrees for 24 hours. Chest pain, shortness of breath, fever, chills, nausea, vomiting, diarrhea, change in mentation, falling, weakness, bleeding. Severe pain or pain not relieved by medications. Or, any other signs or symptoms that you may have questions about.     DISPOSITION:  x  Home With:   OT PT  HH  RN       Long term SNF/Inpatient Rehab    Independent/assisted living    Hospice    Other:       PATIENT CONDITION AT DISCHARGE: Stable      PHYSICAL EXAMINATION AT DISCHARGE:  General:          Alert, cooperative, no distress, appears stated age. HEENT:           Atraumatic, anicteric sclerae, pink conjunctivae                          No oral ulcers, mucosa moist, throat clear, dentition fair  Neck:               Supple, symmetrical  Lungs:             Clear to auscultation bilaterally. No Wheezing or Rhonchi. No rales. Chest wall:      No tenderness  No Accessory muscle use. Heart:              Regular  rhythm,  No  murmur   No edema  Abdomen:        Soft, non-tender. Not distended. Bowel sounds normal  Extremities:     No cyanosis. No clubbing,                            Skin turgor normal, Capillary refill normal  Skin:                Not pale. Not Jaundiced  No rashes   Psych:             Not anxious or agitated. Neurologic:      Alert, moves all extremities, answers questions appropriately and responds to commands     XR SWALLOW FUNC VIDEO   Final Result   For all attempts, a delayed oral phase was noted. Nectar consistency: Pooling in the vallecula with no penetration or aspiration   Thin liquid consistency: Residue in the vallecula. No penetration or aspiration   Honey consistency: Residue in the vallecula. No penetration or aspiration   Puree consistency: No penetration or aspiration. 1 minute 55 seconds of fluoroscopy   DAP: 229.1   14 mGy   Single fluoroscopic image stored on PACS      XR ABD (KUB)   Final Result   Percutaneous gastrostomy tube tip in the gastric antrum. XR CHEST PORT   Final Result   No acute pulmonary process. XR CHEST PORT   Final Result   No significant change. XR CHEST PORT   Final Result      XR CHEST PORT   Final Result   No acute cardiopulmonary abnormality. .       XR CHEST PORT   Final Result      CT HEAD WO CONT   Final Result   Chronic changes which appear stable. No acute or active intracranial process. Chronic paranasal sinus disease         XR CHEST PORT   Final Result   No acute findings. Recent Results (from the past 24 hour(s))   GLUCOSE, POC    Collection Time: 05/02/22  5:58 PM   Result Value Ref Range    Glucose (POC) 272 (H) 65 - 117 mg/dL    Performed by Lester Mahmood, POC    Collection Time: 05/02/22  8:33 PM   Result Value Ref Range    Glucose (POC) 266 (H) 65 - 117 mg/dL    Performed by Avelino Somers    GLUCOSE, POC    Collection Time: 05/02/22 11:44 PM   Result Value Ref Range    Glucose (POC) 239 (H) 65 - 117 mg/dL    Performed by Avelino Somers    GLUCOSE, POC    Collection Time: 05/03/22  5:36 AM   Result Value Ref Range    Glucose (POC) 224 (H) 65 - 117 mg/dL    Performed by Axel Gowers CASEY    CBC WITH AUTOMATED DIFF    Collection Time: 05/03/22  7:58 AM   Result Value Ref Range    WBC 12.8 (H) 3.6 - 11.0 K/uL    RBC 2.98 (L) 3.80 - 5.20 M/uL    HGB 8.3 (L) 11.5 - 16.0 g/dL    HCT 26.3 (L) 35.0 - 47.0 %    MCV 88.3 80.0 - 99.0 FL    MCH 27.9 26.0 - 34.0 PG    MCHC 31.6 30.0 - 36.5 g/dL    RDW 16.2 (H) 11.5 - 14.5 %    PLATELET 772 359 - 450 K/uL    MPV 11.2 8.9 - 12.9 FL    NRBC 0.0 0.0  WBC    ABSOLUTE NRBC 0.00 0.00 - 0.01 K/uL    NEUTROPHILS 69 32 - 75 %    LYMPHOCYTES 19 12 - 49 %    MONOCYTES 8 5 - 13 %    EOSINOPHILS 2 0 - 7 %    BASOPHILS 1 0 - 1 %    IMMATURE GRANULOCYTES 1 (H) 0 - 0.5 %    ABS. NEUTROPHILS 8.9 (H) 1.8 - 8.0 K/UL    ABS. LYMPHOCYTES 2.4 0.8 - 3.5 K/UL    ABS. MONOCYTES 1.0 0.0 - 1.0 K/UL    ABS. EOSINOPHILS 0.3 0.0 - 0.4 K/UL    ABS. BASOPHILS 0.1 0.0 - 0.1 K/UL    ABS. IMM.  GRANS. 0.2 (H) 0.00 - 0.04 K/UL    DF AUTOMATED     C REACTIVE PROTEIN, QT    Collection Time: 05/03/22  7:58 AM   Result Value Ref Range    C-Reactive protein 18.60 (H) 0.00 - 0.60 mg/dL   PROCALCITONIN    Collection Time: 05/03/22  7:58 AM   Result Value Ref Range    Procalcitonin 0.62 (H) 0 ng/mL METABOLIC PANEL, BASIC    Collection Time: 05/03/22 10:00 AM   Result Value Ref Range    Sodium 137 136 - 145 mmol/L    Potassium 4.9 3.5 - 5.1 mmol/L    Chloride 106 97 - 108 mmol/L    CO2 27 21 - 32 mmol/L    Anion gap 4 (L) 5 - 15 mmol/L    Glucose 231 (H) 65 - 100 mg/dL    BUN 35 (H) 6 - 20 mg/dL    Creatinine 0.74 0.55 - 1.02 mg/dL    BUN/Creatinine ratio 47 (H) 12 - 20      GFR est AA >60 >60 ml/min/1.73m2    GFR est non-AA >60 >60 ml/min/1.73m2    Calcium 9.3 8.5 - 10.1 mg/dL   GLUCOSE, POC    Collection Time: 05/03/22 11:51 AM   Result Value Ref Range    Glucose (POC) 243 (H) 65 - 117 mg/dL    Performed by [address] [place] [address] Street:    HPI: Patient is a 69 y. o. year old female with signal past medical history of CVA with PEG tube left great toe gangrene, chronic kidney disease CVA with right-sided weakness hypertension type 2 diabetes bedridden open eyes do not follow any command was transferred to the hospital with fever and altered mental status as per SNF staff patient was awake and following simple command at the nursing home facility and since yesterday not able to follow any command confused transferred to the ER seen by the ER physician work-up shows acute kidney injury with hyperkalemia        Admitted for further evaluation and treat.     04/05   Patient is seen at bedside with daughter and nephew today. Patient is in distress due to pain and daughter notes that patient gets Neurontin TID for neuropathy daily. Otherwise, patient is still receiving enteric feeding through PEG tube and getting IVF.      Patient was seen by nephrology yesterday. Recommends obtaining renal panel and continuing IVF. Patient was seen by ID yesterday. Recommends continuing IV vancomycin for urosepsis.      Patient was seen by pulmonology today.  Recommends PRBC transfusion.      Labs indicate WBC 16.9, Hgb 6.3, Na 148, Cl 120, BUN 72, Cr 1.71, Ca 8, CRP 28.4, pro-carrie 6.93 and .         04/06 Patient is seen at bedside. Patient received 2 units of PRBC. Patient is on Zosyn. No new changes today.      Patient seen by the vascular surgeon he recommend great toe amputation  And for sacral wound recommend Medihoney ointment      Labs show increasing Hgb of 9, decreasing WBC 14.7, Na 147, , BUN 55,   Cr 1.47, and CRP 21.4.     CXR is unremarkable.      04/07  Patient is seen resting at bedside. Patient received one unit of PRBC with increased Hgb of 10.6. No acute changes today.     Patient is seen by nephrology who recommends free water flushes to correct hypernatremia.      Labs show increasing Hgb of 10.6, WBC 15.1, pro-calcitonin 3.78, and CRP 16. 1.     4/8     Patient had a rapid response this morning ABG and chest x-ray was ordered  Patient tachycardic tachypneic  ABG  pH 7.51 PCO2 31 PO2 65 bicarb 26 oxygen saturation 95% on room air     Chest x-ray shows no much changes     4/9     Patient resting the bed open eyes  On room air/breathing through the mouth     4/10     Patient open eyes not in distress breathing through her mouth  According to the nurse patient not sleep all day and all night yesterday        4/11  Patient is seen at bedside. She is awake but not in any distress. Patient is on zosyn. PEG tube in place.      Urine culture positive for Pseudomonas.     CXR shows no focal changes. Labs remarkable for WBC  22.3, Hgb 10.5, Na 153, , Cr 1.55 BUN 94, pro-calcitonin 1.64, and CRP 3.33.     4/12  Patient is on Vancomycin, zosyn and D5W. PEG tube in place. Spoke to daughter at bedside. She has no new complaints. Labs remarkable for:  WBC  18.9,  Cr 1.33, BUN 84,  and CRP 2.33 which are decreasing. Hgb 11.2, Na-corrected 158, , and pro-calcitonin 1.93 which are increasing.      Seen by pulmonology. No new recommendations.     Seen by GI. No new recommendations.      Seen by nephrology. recommends IV D5W IVF to decrease hypernatremia.     Seen by ID.  Recommends discontinuing fluconazole, follow up blood cultures, and continuing IV zosyn and vancomycin.         4/13/2022  Patient is on Vancomycin, zosyn and D5W. PEG tube in place. Nephrology - recommends IV D5W IVF to decrease hypernatremia. Increase glargine to 25 units twice daily to correct hyperglycemia. Once the D5W is discontinued, the dose of glargine will have to be decreased     ID - Continue IV Zosyn and Vancomycin; discontinue Vancomycin if WBC continues to decrease     Pulmonology - No new recommendations.     GI - No new recommendations.     CXR 4/11/2022 - No acute pulmonary process (no focal changes)      Labs remarkable for:  Blood Glucose 380  WBC  19.8  Hgb 10.3   Na-corrected 154  BUN 69  Cr 1.22  CRP 3.14  pro-calcitonin 2.28      4/14  Patient's family members are present at bedside with the patient. They notes that the patient was in distress this morning. Patient still on vancomycin and zosyn. PEG tube in place and patient is receiving enteral feedings.      Seen by vascular surgeon who recommends great toe amputation.      Labs remarkable for Na-corrected 154 improving, Cr 1.19 improving, pro-carrie 2.42,  and CRP 7.65.     4/15  Patient is seen grunting and restless at bedside. Daughter is at bedside. She has no new complaints.      UA on 4/14 still positive for WBC>100, leukocyte esterase and grew yeast.  Labs remarkable for WBC 15.9 decreasing, Hb 9.5, Na-corrected 154, and CRP 7.99.     Patient seen by ID. Recommends continuing zosyn and discontinuing vancomycin.     4/18  Patient is seen at bedside. She is awake and alert x3. She is able to say a few words this morning. She has no new complaints. She is on zosyn and fluconazole. PEG tube is clogged.     Seen by vascular surgeon. Recommends great toe and second toe amputation.  Awaiting for consent from daughter.      Labs remarkable for WBC 11.4 decreasing, Hb 9.8, Na 145, and pro-calcitonin 2.01.     4/19  Patient was talking this morning and has no new complaints today. Patient is still receiving fluconazole. PEG tube in place and working.      Labs remarkable for WBC 12.9, Hgb 9.1, and CRP 7.68. Seen by ID. Recommends transitioning to oral fluconazole 200 mg daily for 11 more days.        History of present illness patient 54 physical at the time of admission as a patient admitted with sepsis UTI acute on chronic kidney disease hyponatremia patient history of CVA history of gangrene of the left great toe sacral decubitus ulcers patient was seen by wound care also seen by the vascular surgeon recommend to continue monitor the gangrene of the foot no surgery at this time patient also seen by the wound care nurse regarding sacral decubitus ulcer no need of debridement at this time patient was treated for hypernatremia and acute kidney injury by the nephrologist which significant improvement    Seen by infectious disease treated with IV Zosyn now discharging back to nursing home on full p.o. fluconazole    Other GI as PEG tube came out not functioning well replaced yesterday patient tolerated the procedure well and tolerating the feeding today    Otherwise patient remained stable follow-up with vascular surgeon as an outpatient regarding gangrene of the foot    Change position every 2 hours at the nursing home for sacral decubitus ulcer keep the wounds clean      Medication reconciliation done time shopping 35 minutes 50% time spent counseling on coordination of      Seen by infectious disease plan on discharging on IV meropenem for 2 more weeks    Wound culture came back positive for Pseudomonas  Sensitive to Cipro/  Start on Cipro 500 twice daily          Patient have a PICC line placed  Patient is alert awake not in distress    Cleared for discharge waiting for placement      Discussed with the patient daughter at the bedside    Signed:   Elizabeth Goss MD  5/3/2022  1:39 PM

## 2022-05-04 NOTE — PROGRESS NOTES
Problem: Diabetes Self-Management  Goal: *Disease process and treatment process  Description: Define diabetes and identify own type of diabetes; list 3 options for treating diabetes. Outcome: Progressing Towards Goal  Goal: *Incorporating nutritional management into lifestyle  Description: Describe effect of type, amount and timing of food on blood glucose; list 3 methods for planning meals. Outcome: Progressing Towards Goal  Goal: *Incorporating physical activity into lifestyle  Description: State effect of exercise on blood glucose levels. Outcome: Progressing Towards Goal  Goal: *Developing strategies to promote health/change behavior  Description: Define the ABC's of diabetes; identify appropriate screenings, schedule and personal plan for screenings. Outcome: Progressing Towards Goal  Goal: *Using medications safely  Description: State effect of diabetes medications on diabetes; name diabetes medication taking, action and side effects. Outcome: Progressing Towards Goal  Goal: *Monitoring blood glucose, interpreting and using results  Description: Identify recommended blood glucose targets  and personal targets. Outcome: Progressing Towards Goal  Goal: *Prevention, detection, treatment of acute complications  Description: List symptoms of hyper- and hypoglycemia; describe how to treat low blood sugar and actions for lowering  high blood glucose level. Outcome: Progressing Towards Goal  Goal: *Prevention, detection and treatment of chronic complications  Description: Define the natural course of diabetes and describe the relationship of blood glucose levels to long term complications of diabetes.   Outcome: Progressing Towards Goal  Goal: *Developing strategies to address psychosocial issues  Description: Describe feelings about living with diabetes; identify support needed and support network  Outcome: Progressing Towards Goal  Goal: *Insulin pump training  Outcome: Progressing Towards Goal  Goal: *Sick day guidelines  Outcome: Progressing Towards Goal  Goal: *Patient Specific Goal (EDIT GOAL, INSERT TEXT)  Outcome: Progressing Towards Goal     Problem: Patient Education: Go to Patient Education Activity  Goal: Patient/Family Education  Outcome: Progressing Towards Goal     Problem: Pressure Injury - Risk of  Goal: *Prevention of pressure injury  Description: Document Shakir Scale and appropriate interventions in the flowsheet. Outcome: Progressing Towards Goal  Note: Pressure Injury Interventions:  Sensory Interventions: Assess changes in LOC,Assess need for specialty bed    Moisture Interventions: Absorbent underpads,Apply protective barrier, creams and emollients    Activity Interventions: Assess need for specialty bed,PT/OT evaluation    Mobility Interventions: Pressure redistribution bed/mattress (bed type)    Nutrition Interventions: Document food/fluid/supplement intake    Friction and Shear Interventions: Apply protective barrier, creams and emollients,Minimize layers                Problem: Patient Education: Go to Patient Education Activity  Goal: Patient/Family Education  Outcome: Progressing Towards Goal     Problem: Falls - Risk of  Goal: *Absence of Falls  Description: Document Maryann Fall Risk and appropriate interventions in the flowsheet.   Outcome: Progressing Towards Goal  Note: Fall Risk Interventions:  Mobility Interventions: Bed/chair exit alarm    Mentation Interventions: Bed/chair exit alarm,Adequate sleep, hydration, pain control    Medication Interventions: Bed/chair exit alarm    Elimination Interventions: Bed/chair exit alarm,Call light in reach              Problem: Patient Education: Go to Patient Education Activity  Goal: Patient/Family Education  Outcome: Progressing Towards Goal     Problem: Impaired Skin Integrity/Pressure Injury Treatment  Goal: *Improvement of Existing Pressure Injury  Outcome: Progressing Towards Goal  Goal: *Prevention of pressure injury  Description: Document Shakir Scale and appropriate interventions in the flowsheet.   Outcome: Progressing Towards Goal  Note: Pressure Injury Interventions:  Sensory Interventions: Assess changes in LOC,Assess need for specialty bed    Moisture Interventions: Absorbent underpads,Apply protective barrier, creams and emollients    Activity Interventions: Assess need for specialty bed,PT/OT evaluation    Mobility Interventions: Pressure redistribution bed/mattress (bed type)    Nutrition Interventions: Document food/fluid/supplement intake    Friction and Shear Interventions: Apply protective barrier, creams and emollients,Minimize layers                Problem: Patient Education: Go to Patient Education Activity  Goal: Patient/Family Education  Outcome: Progressing Towards Goal     Problem: Patient Education: Go to Patient Education Activity  Goal: Patient/Family Education  Outcome: Progressing Towards Goal

## 2022-05-04 NOTE — PROGRESS NOTES
Progress Note    Patient: Rahul Arrieta MRN: [id number]  SSN: [id number]    YOB: 1945  Age: 78 y.o. Sex: female      Admit Date: 4/3/2022    LOS: 30 days     Subjective:   Patient followed for sepsis with now sacral wound infection secondary to E. Coli and Pseudomonas, status post debridement with repeat sacral wound culture grew Pseudomonas now resistant to Meropenem. She was started on Ciprofloxacin to which Pseudomonas was sensitive. Her temperature has trended downward but WBC still increasing. Procal and CRP decreasing. Staff are in the process of changing her Bell catheter prior to discharge. Discussed lab results with daughter. Objective:     Vitals:    05/03/22 1604 05/03/22 2029 05/04/22 0028 05/04/22 0747   BP: 118/71 (!) 149/63 102/64 (!) 151/52   Pulse: 89 81 81 82   Resp: 18 17 18 18   Temp: 100 °F (37.8 °C) 99.8 °F (37.7 °C) 99.2 °F (37.3 °C) 98.5 °F (36.9 °C)   SpO2: 99% 99% 99% 100%   Weight:       Height:            Intake and Output:  Current Shift: No intake/output data recorded. Last three shifts: 05/02 1901 - 05/04 0700  In: -   Out: 5000 [Urine:4000; Drains:1000]    Physical Exam:   Vitals and nursing note reviewed. Constitutional:       General: She is not in acute distress. Appearance: She is ill-appearing. HENT:   Room Air   Cardiovascular:      Rate and Rhythm: Normal rate and regular rhythm. Heart sounds: No murmur heard. Pulmonary:      Effort: Pulmonary effort is normal.      Breath sounds: Normal breath sounds. Abdominal:      General: Bowel sounds are normal.      Palpations: Abdomen is soft. Tenderness: There is no abdominal tenderness. Comments: PEG tube in place, site unremarkable  Genitourinary:     Comments: Bell catheter still with cloudy urine  Flexiseal with empty bag at this time  Musculoskeletal:      Right lower leg: No edema. Left lower leg: No edema.       Comments: Left foot surgical site is dry  Skin: Stage 3 sacral wound with granulating base     Findings: No rash. Neurological: awake and responsive  Psychiatric: normal behavior     Lab/Data Review:     WBC 12,800  UA with WBC >100, budding yeasts    Procal 0.31 < 0.62 <0.54 <0.71 <0.77 <0.80 <0.56 <0.59 <1.47 < 2.91 <4.99 <5.34   CRP 15.10 <18.60 <15.70 < 15.00 <13.20 <11.20 <10.20 <10.10     Blood cultures (4/3) No growth FINAL  Blood cultures (4/10)  No growth FINAL   Urine culture (4/7) >100,000 cfu/ml Pseudomonas aeruginosa   Urine culture (4/14) >100,000 cfu/ml mixed urogenital yasmine   Wound culture sacrum (4/20) E. Coli and Pseudomonas aeruginosa FINAL  Tissue culture sacrum (4/20) Coaugulase negative staphylococci consistent with colonization FINAL   Left foot tissue (4/20) Coagulase negative Staphylococcus species FINAL  Wound culture sacrum (4/29)  Moderate Pseudomonas aruginosa resistant to both Meropenem and Zosyn, but sensitive to Ciprofloxacin and Levaquin    Assessment:     Active Problems:    Hyperkalemia (4/4/2022)      UTI (urinary tract infection) (4/4/2022)      Sepsis ([facility] [address] (4/4/2022)      RICARDO (acute kidney injury) ([facility] [address] (4/4/2022)      AMS (altered mental status) (4/4/2022)    1. Sepsis with fever and leukocytosis, elevated procal and CRP  2. UTI with marked pyuria and bacteriuria, secondary to Pseudomonas aeruginosa, status post 14 days IV Zosyn   3. Altered mentals status, multifactorial including sepsis  4. Uncontrolled diabetes mellitus with hyperglycemia  5. Chronic maxillary sinusitis  6. ASCVD with prior stroke  7. Hepatitis C antibody positive, HCV RNA negative  8. 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 Coli and Pseudomonas aeruginosa, Day #12 Antibiotics, Day #2 oral Ciprofloxacin  10. Acute hypoxic respiratory failure, resolved  11. Diarrhea, presumed secondary to tube feeding  12.  Candida UTI, presumptive, with persistent pyuria and yeasts, status post Fluconazole, therapeutic failure, Day #6 oral Flucytosine    Comment:  Temperature trending downward. WBC still increasing but procal and CRP decreasing today after starting Ciprofloxacin. Concerned that resistance will develop to Ciprofloxacin as well. Have asked [facility] test Malou Campbell and Zerbaxa to give us some other options should this happen (to avoid using aminoglycosides). Plan:   1. Agree with Ciprofloxacin but would increase to 750 mg every 12 hours (or Levaquin 750 mg daily) for 2 more weeks  2. Follow-up pending blood cultures  3. Follow-up susceptibility results to Avycaz and Zerbaxa   4. Reasonable to discontinue Flucyctosine at discharge; may be prudent to change Bell cathter prior to discharge (done)  5. Instructed daughter to contact [facility] if she develops fever after discharge.     Signed By: Kadeem Powell MD     May 4, 2022

## 2022-05-04 NOTE — DISCHARGE SUMMARY
Discharge Summary       PATIENT ID: Lorraine Fu  MRN: [id number]   YOB: 1945    DATE OF ADMISSION: 4/3/2022 10:36 PM    DATE OF DISCHARGE:   PRIMARY CARE PROVIDER: Severiano Alley., MD     ATTENDING PHYSICIAN: Nancy Soto  DISCHARGING PROVIDER: Nancy Soto      CONSULTATIONS: IP CONSULT TO NEPHROLOGY  IP CONSULT TO INFECTIOUS DISEASES  IP CONSULT TO NEPHROLOGY  IP CONSULT TO NEPHROLOGY  IP CONSULT TO GASTROENTEROLOGY  IP CONSULT TO GASTROENTEROLOGY  IP CONSULT TO PULMONOLOGY  IP CONSULT TO GENERAL SURGERY  IP CONSULT TO GENERAL SURGERY    PROCEDURES/SURGERIES: Procedure(s):  SACRAL DEBRIDEMENT    ADMITTING DIAGNOSES:    Patient Active Problem List    Diagnosis Date Noted    Hyperkalemia 04/04/2022    UTI (urinary tract infection) 04/04/2022    Sepsis ([address] 04/04/2022    RICARDO (acute kidney injury) ([address] 04/04/2022    AMS (altered mental status) 04/04/2022    CVA (cerebral vascular accident) ([address] 02/21/2022    Elevated troponin 02/21/2022    Ischemia of both lower extremities 07/22/2020    Pain in both lower legs 07/22/2020    Acute osteomyelitis of ankle or foot ([address] 04/04/2019    Diabetic peripheral neuropathy ([address] 04/04/2019    Spinal stenosis in cervical region 04/04/2019    Type 2 diabetes mellitus with nephropathy ([address] 01/31/2018    DM type 2 causing vascular disease ([address] 07/27/2017    Hypercalcemia 12/12/2016    PAD (peripheral artery disease) ([facility] [address] 04/11/2016    Infection of nailbed of toe of left foot 09/10/2015    Diabetes mellitus with neurological manifestations, uncontrolled 08/08/2013    HTN (hypertension)     Hyperlipidemia     Neuropathy     Sciatica        DISCHARGE DIAGNOSES / PLAN:      Severe sepsis with UTI  UTI/Pseudomonas  Acute on chronic kidney disease  Hyperkalemia  Hypernatremia, improving  CVA with right-sided weakness  Anemia of chronic disease  Gangrene of the left great toe  Sacral decubitus ulcer  Acute respiratory failure  Possible aspiration  Elevated BNP  PEG tube malfunction/seen by the GI now working   Gangrene of the left great toe-s/p removal of left great and second toe  Static post debridement of the sacral wound        DISCHARGE MEDICATIONS:  Current Discharge Medication List      START taking these medications    Details   cholestyramine-aspartame (QUESTRAN LIGHT) 4 gram packet Take 1 Packet by mouth two (2) times daily (with meals). Qty: 60 Each, Refills: 0  Start date: 5/4/2022      ciprofloxacin HCl (CIPRO) 750 mg tablet Take 1 Tablet by mouth every twelve (12) hours for 12 doses. Qty: 12 Tablet, Refills: 0  Start date: 5/4/2022, End date: 5/10/2022      flucytosine (ANCOBON) 500 mg capsule Take 1 Capsule by mouth every six (6) hours for 14 days. Qty: 56 Capsule, Refills: 0  Start date: 5/4/2022, End date: 5/18/2022      loperamide (IMODIUM) 2 mg capsule Take 1 Capsule by mouth three (3) times daily as needed for Diarrhea for up to 10 days. Qty: 10 Capsule, Refills: 0  Start date: 5/4/2022, End date: 5/14/2022      albuterol-ipratropium (DUO-NEB) 2.5 mg-0.5 mg/3 ml nebu 3 mL by Nebulization route every six (6) hours as needed for Wheezing or Shortness of Breath. Qty: 30 Nebule, Refills: 1  Start date: 4/19/2022      hydrALAZINE (APRESOLINE) 50 mg tablet Take 1 Tablet by mouth three (3) times daily. Qty: 60 Tablet, Refills: 0  Start date: 4/19/2022      metoprolol tartrate (LOPRESSOR) 50 mg tablet Take 1 Tablet by mouth two (2) times a day. Qty: 60 Tablet, Refills: 0  Start date: 4/19/2022         CONTINUE these medications which have NOT CHANGED    Details   gabapentin (NEURONTIN) 300 mg capsule Take 1 Capsule by mouth two (2) times a day. Max Daily Amount: 600 mg. Qty: 12 Capsule, Refills: 0    Associated Diagnoses: Pain      insulin glargine (LANTUS) 100 unit/mL injection 50 Units by SubCUTAneous route daily.   Qty: 10 mL, Refills: 0      acidophilus-pectin, citrus 25 million cell -100 mg tab tablet Take 2 Tablets by mouth daily. Qty: 30 Tablet, Refills: 0      amLODIPine (NORVASC) 5 mg tablet Take 1 Tablet by mouth daily. Qty: 30 Tablet, Refills: 0      artificial saliva (MOUTH KOTE) spra Take 1 Spray by mouth three (3) times daily. Qty: 10 mL, Refills: 0      latanoprost (XALATAN) 0.005 % ophthalmic solution Administer 1 Drop to right eye every evening. Qty: 10 mL, Refills: 0      atorvastatin (LIPITOR) 20 mg tablet TAKE 1 TABLET BY MOUTH EVERY NIGHT  Qty: 30 Tablet, Refills: 5    Associated Diagnoses: Type II or unspecified type diabetes mellitus with neurological manifestations, uncontrolled(250.62) ([address] Essential hypertension; Hyperlipidemia, unspecified hyperlipidemia type; Vitamin D deficiency; Vitamin B12 deficiency; Neuropathy; Type 2 diabetes mellitus with hyperglycemia, with long-term current use of insulin ([address] Diabetes mellitus with neurological manifestations, uncontrolled; Mixed hyperlipidemia; PAD (peripheral artery disease) ([address] Hypercalcemia; Uncontrolled type 2 diabetes mellitus with hyperglycemia, with long-term current use of insulin (HCC)      brimonidine (ALPHAGAN) 0.2 % ophthalmic solution Administer 1 Drop to both eyes three (3) times daily. aspirin delayed-release 81 mg tablet Take  by mouth daily. ferrous sulfate 325 mg (65 mg iron) tablet Take 1 Tab by mouth two (2) times a day. Qty: 60 Tab, Refills: 5    Associated Diagnoses: Diabetes mellitus with neurological manifestations, uncontrolled; Essential hypertension; PAD (peripheral artery disease) ([address] Mixed hyperlipidemia; Type 2 diabetes mellitus with hyperglycemia, with long-term current use of insulin ([address] Hypercalcemia; Uncontrolled type 2 diabetes mellitus with hyperglycemia, with long-term current use of insulin ([address] Type II or unspecified type diabetes mellitus with neurological manifestations, uncontrolled(250.62) ([address]  Hyperlipidemia, unspecified hyperlipidemia type; Vitamin D deficiency; Vitamin B12 deficiency; Neuropathy         STOP taking these medications       fluconazole (DIFLUCAN) 100 mg tablet Comments:   Reason for Stopping:         lidocaine (XYLOCAINE) 4 % (40 mg/mL) topical solution Comments:   Reason for Stopping:         metoprolol succinate (TOPROL-XL) 50 mg XL tablet Comments:   Reason for Stopping:                 NOTIFY YOUR PHYSICIAN FOR ANY OF THE FOLLOWING:   Fever over 101 degrees for 24 hours. Chest pain, shortness of breath, fever, chills, nausea, vomiting, diarrhea, change in mentation, falling, weakness, bleeding. Severe pain or pain not relieved by medications. Or, any other signs or symptoms that you may have questions about. DISPOSITION:  x  Home With:   OT  PT  HH  RN       Long term SNF/Inpatient Rehab    Independent/assisted living    Hospice    Other:       PATIENT CONDITION AT DISCHARGE: Stable      PHYSICAL EXAMINATION AT DISCHARGE:  General:          Alert, cooperative, no distress, appears stated age. HEENT:           Atraumatic, anicteric sclerae, pink conjunctivae                          No oral ulcers, mucosa moist, throat clear, dentition fair  Neck:               Supple, symmetrical  Lungs:             Clear to auscultation bilaterally. No Wheezing or Rhonchi. No rales. Chest wall:      No tenderness  No Accessory muscle use. Heart:              Regular  rhythm,  No  murmur   No edema  Abdomen:        Soft, non-tender. Not distended. Bowel sounds normal  Extremities:     No cyanosis. No clubbing,                            Skin turgor normal, Capillary refill normal  Skin:                Not pale. Not Jaundiced  No rashes   Psych:             Not anxious or agitated. Neurologic:      Alert, moves all extremities, answers questions appropriately and responds to commands     XR SWALLOW FUNC VIDEO   Final Result   For all attempts, a delayed oral phase was noted.    Nectar consistency: Pooling in the vallecula with no penetration or aspiration   Thin liquid consistency: Residue in the vallecula. No penetration or aspiration   Honey consistency: Residue in the vallecula. No penetration or aspiration   Puree consistency: No penetration or aspiration. 1 minute 55 seconds of fluoroscopy   DAP: 229.1   14 mGy   Single fluoroscopic image stored on PACS      XR ABD (KUB)   Final Result   Percutaneous gastrostomy tube tip in the gastric antrum. XR CHEST PORT   Final Result   No acute pulmonary process. XR CHEST PORT   Final Result   No significant change. XR CHEST PORT   Final Result      XR CHEST PORT   Final Result   No acute cardiopulmonary abnormality. .       XR CHEST PORT   Final Result      CT HEAD WO CONT   Final Result   Chronic changes which appear stable. No acute or active intracranial process. Chronic paranasal sinus disease         XR CHEST PORT   Final Result   No acute findings.            Recent Results (from the past 24 hour(s))   GLUCOSE, POC    Collection Time: 05/03/22  4:14 PM   Result Value Ref Range    Glucose (POC) 203 (H) 65 - 117 mg/dL    Performed by Waldo Fairbanks    GLUCOSE, POC    Collection Time: 05/03/22  7:47 PM   Result Value Ref Range    Glucose (POC) 189 (H) 65 - 117 mg/dL    Performed by Marline Phelps    GLUCOSE, POC    Collection Time: 05/04/22 12:07 AM   Result Value Ref Range    Glucose (POC) 189 (H) 65 - 117 mg/dL    Performed by Marline Phelps    GLUCOSE, POC    Collection Time: 05/04/22  5:26 AM   Result Value Ref Range    Glucose (POC) 167 (H) 65 - 117 mg/dL    Performed by Patricia MOSLEY    CBC WITH AUTOMATED DIFF    Collection Time: 05/04/22  7:07 AM   Result Value Ref Range    WBC 14.1 (H) 3.6 - 11.0 K/uL    RBC 2.82 (L) 3.80 - 5.20 M/uL    HGB 8.0 (L) 11.5 - 16.0 g/dL    HCT 25.2 (L) 35.0 - 47.0 %    MCV 89.4 80.0 - 99.0 FL    MCH 28.4 26.0 - 34.0 PG    MCHC 31.7 30.0 - 36.5 g/dL    RDW 16.3 (H) 11.5 - 14.5 %    PLATELET 753 035 - 248 K/uL    MPV 10.8 8.9 - 12.9 FL    NRBC 0.0 0.0  WBC    ABSOLUTE NRBC 0.00 0.00 - 0.01 K/uL    NEUTROPHILS 74 32 - 75 %    LYMPHOCYTES 15 12 - 49 %    MONOCYTES 6 5 - 13 %    EOSINOPHILS 3 0 - 7 %    BASOPHILS 1 0 - 1 %    IMMATURE GRANULOCYTES 1 (H) 0 - 0.5 %    ABS. NEUTROPHILS 10.5 (H) 1.8 - 8.0 K/UL    ABS. LYMPHOCYTES 2.1 0.8 - 3.5 K/UL    ABS. MONOCYTES 0.9 0.0 - 1.0 K/UL    ABS. EOSINOPHILS 0.4 0.0 - 0.4 K/UL    ABS. BASOPHILS 0.1 0.0 - 0.1 K/UL    ABS. IMM. GRANS. 0.2 (H) 0.00 - 0.04 K/UL    DF AUTOMATED     C REACTIVE PROTEIN, QT    Collection Time: 05/04/22  7:07 AM   Result Value Ref Range    C-Reactive protein 15.10 (H) 0.00 - 0.60 mg/dL   PROCALCITONIN    Collection Time: 05/04/22  7:07 AM   Result Value Ref Range    Procalcitonin 0.31 (H) 0 ng/mL   GLUCOSE, POC    Collection Time: 05/04/22 11:43 AM   Result Value Ref Range    Glucose (POC) 229 (H) 65 - 117 mg/dL    Performed by [address] [place] [address]:    HPI: Patient is a 69 y. o. year old female with signal past medical history of CVA with PEG tube left great toe gangrene, chronic kidney disease CVA with right-sided weakness hypertension type 2 diabetes bedridden open eyes do not follow any command was transferred to the hospital with fever and altered mental status as per SNF staff patient was awake and following simple command at the nursing home facility and since yesterday not able to follow any command confused transferred to the ER seen by the ER physician work-up shows acute kidney injury with hyperkalemia        Admitted for further evaluation and treat.     04/05   Patient is seen at bedside with daughter and nephew today. Patient is in distress due to pain and daughter notes that patient gets Neurontin TID for neuropathy daily. Otherwise, patient is still receiving enteric feeding through PEG tube and getting IVF.      Patient was seen by nephrology yesterday. Recommends obtaining renal panel and continuing IVF. Patient was seen by ID yesterday.  Recommends continuing IV vancomycin for urosepsis.      Patient was seen by pulmonology today. Recommends PRBC transfusion.      Labs indicate WBC 16.9, Hgb 6.3, Na 148, Cl 120, BUN 72, Cr 1.71, Ca 8, CRP 28.4, pro-carrie 6.93 and .         04/06   Patient is seen at bedside. Patient received 2 units of PRBC. Patient is on Zosyn. No new changes today.      Patient seen by the vascular surgeon he recommend great toe amputation  And for sacral wound recommend Medihoney ointment      Labs show increasing Hgb of 9, decreasing WBC 14.7, Na 147, , BUN 55,   Cr 1.47, and CRP 21.4.     CXR is unremarkable.      04/07  Patient is seen resting at bedside. Patient received one unit of PRBC with increased Hgb of 10.6. No acute changes today.     Patient is seen by nephrology who recommends free water flushes to correct hypernatremia.      Labs show increasing Hgb of 10.6, WBC 15.1, pro-calcitonin 3.78, and CRP 16. 1.     4/8     Patient had a rapid response this morning ABG and chest x-ray was ordered  Patient tachycardic tachypneic  ABG  pH 7.51 PCO2 31 PO2 65 bicarb 26 oxygen saturation 95% on room air     Chest x-ray shows no much changes     4/9     Patient resting the bed open eyes  On room air/breathing through the mouth     4/10     Patient open eyes not in distress breathing through her mouth  According to the nurse patient not sleep all day and all night yesterday        4/11  Patient is seen at bedside. She is awake but not in any distress. Patient is on zosyn. PEG tube in place.      Urine culture positive for Pseudomonas.     CXR shows no focal changes. Labs remarkable for WBC  22.3, Hgb 10.5, Na 153, , Cr 1.55 BUN 94, pro-calcitonin 1.64, and CRP 3.33.     4/12  Patient is on Vancomycin, zosyn and D5W. PEG tube in place. Spoke to daughter at bedside. She has no new complaints. Labs remarkable for:  WBC  18.9,  Cr 1.33, BUN 84,  and CRP 2.33 which are decreasing.   Hgb 11.2, Na-corrected 158, , and pro-calcitonin 1.93 which are increasing.      Seen by pulmonology. No new recommendations.     Seen by GI. No new recommendations.      Seen by nephrology. recommends IV D5W IVF to decrease hypernatremia.     Seen by ID. Recommends discontinuing fluconazole, follow up blood cultures, and continuing IV zosyn and vancomycin.         4/13/2022  Patient is on Vancomycin, zosyn and D5W. PEG tube in place. Nephrology - recommends IV D5W IVF to decrease hypernatremia. Increase glargine to 25 units twice daily to correct hyperglycemia. Once the D5W is discontinued, the dose of glargine will have to be decreased     ID - Continue IV Zosyn and Vancomycin; discontinue Vancomycin if WBC continues to decrease     Pulmonology - No new recommendations.     GI - No new recommendations.     CXR 4/11/2022 - No acute pulmonary process (no focal changes)      Labs remarkable for:  Blood Glucose 380  WBC  19.8  Hgb 10.3   Na-corrected 154  BUN 69  Cr 1.22  CRP 3.14  pro-calcitonin 2.28      4/14  Patient's family members are present at bedside with the patient. They notes that the patient was in distress this morning. Patient still on vancomycin and zosyn. PEG tube in place and patient is receiving enteral feedings.      Seen by vascular surgeon who recommends great toe amputation.      Labs remarkable for Na-corrected 154 improving, Cr 1.19 improving, pro-carrie 2.42,  and CRP 7.65.     4/15  Patient is seen grunting and restless at bedside. Daughter is at bedside. She has no new complaints.      UA on 4/14 still positive for WBC>100, leukocyte esterase and grew yeast.  Labs remarkable for WBC 15.9 decreasing, Hb 9.5, Na-corrected 154, and CRP 7.99.     Patient seen by ID. Recommends continuing zosyn and discontinuing vancomycin.     4/18  Patient is seen at bedside. She is awake and alert x3. She is able to say a few words this morning. She has no new complaints. She is on zosyn and fluconazole. PEG tube is clogged.     Seen by vascular surgeon. Recommends great toe and second toe amputation. Awaiting for consent from daughter.      Labs remarkable for WBC 11.4 decreasing, Hb 9.8, Na 145, and pro-calcitonin 2.01.     4/19  Patient was talking this morning and has no new complaints today. Patient is still receiving fluconazole. PEG tube in place and working.      Labs remarkable for WBC 12.9, Hgb 9.1, and CRP 7.68. Seen by ID. Recommends transitioning to oral fluconazole 200 mg daily for 11 more days.        History of present illness patient 54 physical at the time of admission as a patient admitted with sepsis UTI acute on chronic kidney disease hyponatremia patient history of CVA history of gangrene of the left great toe sacral decubitus ulcers patient was seen by wound care also seen by the vascular surgeon recommend to continue monitor the gangrene of the foot no surgery at this time patient also seen by the wound care nurse regarding sacral decubitus ulcer no need of debridement at this time patient was treated for hypernatremia and acute kidney injury by the nephrologist which significant improvement    Seen by infectious disease treated with IV Zosyn now discharging back to nursing home on full p.o. fluconazole    Other GI as PEG tube came out not functioning well replaced yesterday patient tolerated the procedure well and tolerating the feeding today    Otherwise patient remained stable follow-up with vascular surgeon as an outpatient regarding gangrene of the foot    Change position every 2 hours at the nursing home for sacral decubitus ulcer keep the wounds clean      Medication reconciliation done time shopping 35 minutes 50% time spent counseling on coordination of      Seen by infectious disease plan on discharging on IV meropenem for 2 more weeks    Wound culture came back positive for Pseudomonas  Sensitive to Cipro/  Start on Cipro 500 twice daily          Patient have a PICC line placed  Patient is alert awake not in distress    Cleared for discharge waiting for placement      Discussed with the patient daughter at the bedside    Signed:   Uma Sanchez MD  5/4/2022  1:39 PM

## 2022-05-04 NOTE — PROGRESS NOTES
VSS. Provided pt and Daughter Kraig Brochure discharge instructions at bedside. Pt to discharge to [facility] skilled nursing facility. RN called and gave report to Chuy Musa LPN all questions answered, verbalized understanding, and number provided for follow up questions. Pt to discharge with giraldo changed out on day of discharge and currently clean, dry, and intact, and a KIAN Midline cath in place.     Karuna Vitale RN

## 2022-05-04 NOTE — PROGRESS NOTES
Problem: Diabetes Self-Management  Goal: *Disease process and treatment process  Description: Define diabetes and identify own type of diabetes; list 3 options for treating diabetes. 5/4/2022 1035 by Tino Segundo RN  Outcome: Progressing Towards Goal  5/4/2022 1032 by Tino Segundo RN  Outcome: Progressing Towards Goal  Goal: *Incorporating nutritional management into lifestyle  Description: Describe effect of type, amount and timing of food on blood glucose; list 3 methods for planning meals. 5/4/2022 1035 by Tino Segundo RN  Outcome: Progressing Towards Goal  5/4/2022 1032 by Tino Segundo RN  Outcome: Progressing Towards Goal  Goal: *Incorporating physical activity into lifestyle  Description: State effect of exercise on blood glucose levels. 5/4/2022 1035 by Tino Segundo RN  Outcome: Progressing Towards Goal  5/4/2022 1032 by Tino Segundo RN  Outcome: Progressing Towards Goal  Goal: *Developing strategies to promote health/change behavior  Description: Define the ABC's of diabetes; identify appropriate screenings, schedule and personal plan for screenings. 5/4/2022 1035 by Tino Segundo RN  Outcome: Progressing Towards Goal  5/4/2022 1032 by Tino Segundo RN  Outcome: Progressing Towards Goal  Goal: *Using medications safely  Description: State effect of diabetes medications on diabetes; name diabetes medication taking, action and side effects. 5/4/2022 1035 by Tino Segundo RN  Outcome: Progressing Towards Goal  5/4/2022 1032 by Tino Segundo RN  Outcome: Progressing Towards Goal  Goal: *Monitoring blood glucose, interpreting and using results  Description: Identify recommended blood glucose targets  and personal targets.   5/4/2022 1035 by Tino Segundo RN  Outcome: Progressing Towards Goal  5/4/2022 1032 by Tino Segundo RN  Outcome: Progressing Towards Goal  Goal: *Prevention, detection, treatment of acute complications  Description: List symptoms of hyper- and hypoglycemia; describe how to treat low blood sugar and actions for lowering  high blood glucose level. 5/4/2022 1035 by Karen Molina RN  Outcome: Progressing Towards Goal  5/4/2022 1032 by Karen Molina RN  Outcome: Progressing Towards Goal  Goal: *Prevention, detection and treatment of chronic complications  Description: Define the natural course of diabetes and describe the relationship of blood glucose levels to long term complications of diabetes. 5/4/2022 1035 by Karen Molina RN  Outcome: Progressing Towards Goal  5/4/2022 1032 by Karen Molina RN  Outcome: Progressing Towards Goal  Goal: *Developing strategies to address psychosocial issues  Description: Describe feelings about living with diabetes; identify support needed and support network  5/4/2022 1035 by Karen Molian RN  Outcome: Progressing Towards Goal  5/4/2022 1032 by Karen Molina RN  Outcome: Progressing Towards Goal  Goal: *Insulin pump training  5/4/2022 1035 by Karen Molina RN  Outcome: Progressing Towards Goal  5/4/2022 1032 by Karen Molina RN  Outcome: Progressing Towards Goal  Goal: *Sick day guidelines  5/4/2022 1035 by Karen Molina RN  Outcome: Progressing Towards Goal  5/4/2022 1032 by Karen Molina RN  Outcome: Progressing Towards Goal  Goal: *Patient Specific Goal (EDIT GOAL, INSERT TEXT)  5/4/2022 1035 by Karen Molina RN  Outcome: Progressing Towards Goal  5/4/2022 1032 by Karen Molina RN  Outcome: Progressing Towards Goal     Problem: Patient Education: Go to Patient Education Activity  Goal: Patient/Family Education  5/4/2022 1035 by Karen Molina RN  Outcome: Progressing Towards Goal  5/4/2022 1032 by Karen Molina RN  Outcome: Progressing Towards Goal     Problem: Pressure Injury - Risk of  Goal: *Prevention of pressure injury  Description: Document Shakir Scale and appropriate interventions in the flowsheet.   5/4/2022 1035 by Karen Molina RN  Outcome: Progressing Towards Goal  Note: Pressure Injury Interventions:  Sensory Interventions: Assess changes in LOC,Assess need for specialty bed    Moisture Interventions: Absorbent underpads,Apply protective barrier, creams and emollients    Activity Interventions: Assess need for specialty bed,PT/OT evaluation    Mobility Interventions: Pressure redistribution bed/mattress (bed type)    Nutrition Interventions: Document food/fluid/supplement intake    Friction and Shear Interventions: Apply protective barrier, creams and emollients,Minimize layers             5/4/2022 1032 by Margo Noel RN  Outcome: Progressing Towards Goal  Note: Pressure Injury Interventions:  Sensory Interventions: Assess changes in LOC,Assess need for specialty bed    Moisture Interventions: Absorbent underpads,Apply protective barrier, creams and emollients    Activity Interventions: Assess need for specialty bed,PT/OT evaluation    Mobility Interventions: Pressure redistribution bed/mattress (bed type)    Nutrition Interventions: Document food/fluid/supplement intake    Friction and Shear Interventions: Apply protective barrier, creams and emollients,Minimize layers                Problem: Patient Education: Go to Patient Education Activity  Goal: Patient/Family Education  5/4/2022 1035 by Margo Noel RN  Outcome: Progressing Towards Goal  5/4/2022 1032 by Margo Noel RN  Outcome: Progressing Towards Goal     Problem: Falls - Risk of  Goal: *Absence of Falls  Description: Document [place] Fall Risk and appropriate interventions in the flowsheet.   5/4/2022 1035 by Margo Noel RN  Outcome: Progressing Towards Goal  Note: Fall Risk Interventions:  Mobility Interventions: Bed/chair exit alarm    Mentation Interventions: Bed/chair exit alarm,Adequate sleep, hydration, pain control    Medication Interventions: Bed/chair exit alarm    Elimination Interventions: Bed/chair exit alarm,Call light in reach           5/4/2022 1032 by Margo Noel RN  Outcome: Progressing Towards Goal  Note: Fall Risk Interventions:  Mobility Interventions: Bed/chair exit alarm    Mentation Interventions: Bed/chair exit alarm,Adequate sleep, hydration, pain control    Medication Interventions: Bed/chair exit alarm    Elimination Interventions: Bed/chair exit alarm,Call light in reach              Problem: Patient Education: Go to Patient Education Activity  Goal: Patient/Family Education  5/4/2022 1035 by Dov Hess RN  Outcome: Progressing Towards Goal  5/4/2022 1032 by Dov Hess RN  Outcome: Progressing Towards Goal     Problem: Impaired Skin Integrity/Pressure Injury Treatment  Goal: *Improvement of Existing Pressure Injury  5/4/2022 1035 by Dov Hess RN  Outcome: Progressing Towards Goal  5/4/2022 1032 by Dov Hess RN  Outcome: Progressing Towards Goal  Goal: *Prevention of pressure injury  Description: Document Shakir Scale and appropriate interventions in the flowsheet.   5/4/2022 1035 by Dov Hess RN  Outcome: Progressing Towards Goal  Note: Pressure Injury Interventions:  Sensory Interventions: Assess changes in LOC,Assess need for specialty bed    Moisture Interventions: Absorbent underpads,Apply protective barrier, creams and emollients    Activity Interventions: Assess need for specialty bed,PT/OT evaluation    Mobility Interventions: Pressure redistribution bed/mattress (bed type)    Nutrition Interventions: Document food/fluid/supplement intake    Friction and Shear Interventions: Apply protective barrier, creams and emollients,Minimize layers             5/4/2022 1032 by oDv Hess RN  Outcome: Progressing Towards Goal  Note: Pressure Injury Interventions:  Sensory Interventions: Assess changes in LOC,Assess need for specialty bed    Moisture Interventions: Absorbent underpads,Apply protective barrier, creams and emollients    Activity Interventions: Assess need for specialty bed,PT/OT evaluation    Mobility Interventions: Pressure redistribution bed/mattress (bed type)    Nutrition Interventions: Document food/fluid/supplement intake    Friction and Shear Interventions: Apply protective barrier, creams and emollients,Minimize layers                Problem: Patient Education: Go to Patient Education Activity  Goal: Patient/Family Education  5/4/2022 1035 by Monika Kenny RN  Outcome: Progressing Towards Goal  5/4/2022 1032 by Monika Kenny RN  Outcome: Progressing Towards Goal     Problem: Patient Education: Go to Patient Education Activity  Goal: Patient/Family Education  5/4/2022 1035 by Monika Kenny RN  Outcome: Progressing Towards Goal  5/4/2022 1032 by Monika Kenny RN  Outcome: Progressing Towards Goal

## 2022-05-04 NOTE — PROGRESS NOTES
Patient is afebrile and ready for discharge to [facility] today. CM sent updated clinicals to [place]. Daughter will provide copy of COVID card to facility. 12:57pm- Patient assigned to room 224A. Nurse to call report to [phone]. Transport  for 3pm. Daughter Eli Garcia informed. [phone].

## 2022-05-12 NOTE — TELEPHONE ENCOUNTER
Phone call from [facility]. She needs to ask Dr Sarah Mawxell about further treatment for this patient. She faxed over her labs on my request and I scanned them into chart under . The patient has finished her Cipro is on antifungal. Please advise ,the patient's family is very concerned because her follow up appointment with Dr Sarah Maxwell is not until May 19. I will send message to Dr Sarah Maxwell .

## 2022-05-12 NOTE — TELEPHONE ENCOUNTER
Thanks. I spoke with the caller and requested a sacral wound culture. She has completed oral Ciprofloxacin, however, my recommendations were to continue for 2 weeks which would end on 5/18/22. Her WBC is elevated but reportedly she has not had fever.

## 2022-05-12 NOTE — TELEPHONE ENCOUNTER
Phone call from the patient's daughter. She would like Dr Mouna Holman to call her re: her Mom. She states that her mom's iron level is very low,and she does not see Dr Mouna Holman until May 19. She wants to make sure her Mom is receiving proper care. I told her that we received her recent labs and I sent them to Dr Mouna Holman for review. She mentioned that the patient is not scheduled to see Dr Mouna Holman until May 19 but I told her I spoke with the nurse at [facility] and she is receiving would care from an in house wound care group. The nurse states that she has some testing ordered by them. I will send this message to Dr Mouna Holman. The daughter states she would like to speak to Dr Mouna Holman as soon as possible. The phone number for the daughter is [phone]

## 2022-05-13 NOTE — TELEPHONE ENCOUNTER
Spoke with daughter and discussed patient's elevated WBC. Informed her that I had requested another sacral wound culture. She was concerned because patient did not receive a wound vac as was expected.

## 2022-05-14 PROBLEM — L98.429 SACRAL ULCER (HCC): Status: ACTIVE | Noted: 2022-01-01

## 2022-05-14 NOTE — ED TRIAGE NOTES
Pt from [facility] here for leg pain but pt denies leg pain at this time. Family wanted her to come get checked out as well as her wound.

## 2022-05-14 NOTE — Clinical Note
Status[de-identified] INPATIENT [101]   Type of Bed: Medical [8]   Inpatient Hospitalization Certified Necessary for the Following Reasons: 9.  Other (further clarification in H&P documentation)   Admitting Diagnosis: Sacral ulcer ([facility] [[id number]]   Admitting Physician: Florecita Rodriguez   Attending Physician: Florecita Rodriguez   Estimated Length of Stay: 2 Midnights   Discharge Plan[de-identified] Home with Office Follow-up

## 2022-05-15 NOTE — H&P
History and Physical    Patient: Rossy Ramires MRN: [id number]  SSN: [id number]    YOB: 1945  Age: 78 y.o. Sex: female      Subjective:      Rossy Ramires is a 78 y.o. female who has a history of anemia chronic kidney disease resolved diabetes mellitus hemiplegia, hypertension, hyperlipidemia, neuropathy, peripheral arterial disease, stroke multiple UTI bedbound patient was brought from the nursing home with a complaint of leg pain. Patient however denied any leg pain she has a decubitus ulcers and WBC is elevated at 16,000    Past Medical History:   Diagnosis Date    Anemia     Chronic kidney disease     Diabetes mellitus ([facility]     Hemiplegia affecting dominant side, post-stroke ([facility] 05/04/2022    HTN (hypertension)     Hyperkalemia     Hyperlipidemia     Ill-defined condition     Neuropathy     PAD (peripheral artery disease) (HCC)     PCI    Sciatica     Stroke ([facility]     UTI (urinary tract infection)      Past Surgical History:   Procedure Laterality Date    HX AMPUTATION Right     great toe    HX CERVICAL FUSION      HX GI      HX OTHER SURGICAL Bilateral     Stent placement    HX OTHER SURGICAL      HX VASCULAR STENT Bilateral     2 in right 1 in left    HX VASCULAR STENT Bilateral       Family History   Problem Relation Age of Onset    Cancer Mother     Diabetes Mother     Hypertension Mother      Social History     Tobacco Use    Smoking status: Never Smoker    Smokeless tobacco: Never Used   Substance Use Topics    Alcohol use: Not Currently      Prior to Admission medications    Medication Sig Start Date End Date Taking? Authorizing Provider   traMADoL (ULTRAM) 50 mg tablet Take 25 mg by mouth every twelve (12) hours as needed for Pain. Provider, Historical   fenofibrate nanocrystallized (Tricor) 48 mg tablet Take 48 mg by mouth.  Indications: excessive fat in the blood    Provider, Historical   cholestyramine-aspartame (QUESTRAN LIGHT) 4 gram packet Take 1 Packet by mouth two (2) times daily (with meals). 5/4/22   Jennifer Soto MD   flucytosine (ANCOBON) 500 mg capsule Take 1 Capsule by mouth every six (6) hours for 14 days. 5/4/22 5/18/22  Jennifer Soto MD   albuterol-ipratropium (DUO-NEB) 2.5 mg-0.5 mg/3 ml nebu 3 mL by Nebulization route every six (6) hours as needed for Wheezing or Shortness of Breath. 4/19/22   Jennifer Soto MD   hydrALAZINE (APRESOLINE) 50 mg tablet Take 1 Tablet by mouth three (3) times daily. 4/19/22   Jennifer Soto MD   metoprolol tartrate (LOPRESSOR) 50 mg tablet Take 1 Tablet by mouth two (2) times a day. 4/19/22   Jennifer Soto MD   gabapentin (NEURONTIN) 300 mg capsule Take 1 Capsule by mouth two (2) times a day. Max Daily Amount: 600 mg. 3/14/22   Noel Garcia MD   insulin glargine (LANTUS) 100 unit/mL injection 50 Units by SubCUTAneous route daily. 3/14/22   Noel Garcia MD   acidophilus-pectin, citrus 25 million cell -100 mg tab tablet Take 2 Tablets by mouth daily. 3/15/22   Howard JACKSON MD   amLODIPine (NORVASC) 5 mg tablet Take 1 Tablet by mouth daily. 3/14/22   Noel Garcia MD   artificial saliva (MOUTH KOTE) spra Take 1 Spray by mouth three (3) times daily. 3/14/22   Howard JACKSON MD   latanoprost (XALATAN) 0.005 % ophthalmic solution Administer 1 Drop to right eye every evening. 3/14/22   Noel Garcia MD   atorvastatin (LIPITOR) 20 mg tablet TAKE 1 TABLET BY MOUTH EVERY NIGHT 2/3/22   Shamir Franco MD   brimonidine (ALPHAGAN) 0.2 % ophthalmic solution Administer 1 Drop to both eyes three (3) times daily. Provider, Historical   aspirin delayed-release 81 mg tablet Take  by mouth daily. Provider, Historical   ferrous sulfate 325 mg (65 mg iron) tablet Take 1 Tab by mouth two (2) times a day. 12/19/17   Shamir Franco MD        No Known Allergies    Review of Systems:  Review of systems not obtained due to patient factors.     Objective:     Vitals:    05/14/22 2151 05/15/22 0108 05/15/22 0348 05/15/22 0716   BP: 120/63 125/60 (!) 113/52 125/61   Pulse: 93 97 94 95   Resp: 22 20 20 16   Temp:  99.7 °F (37.6 °C) 99.4 °F (37.4 °C) 98.1 °F (36.7 °C)   SpO2: 97% 96% 100% 96%   Weight:       Height:            Physical Exam:  General:  Alert, cooperative, no distress, appears stated age. Eyes:  Conjunctivae/corneas clear. PERRL, EOMs intact. Fundi benign   Ears:  Normal TMs and external ear canals both ears. Nose: Nares normal. Septum midline. Mucosa normal. No drainage or sinus tenderness. Mouth/Throat: Lips, mucosa, and tongue normal. Teeth and gums normal.   Neck: Supple, symmetrical, trachea midline, no adenopathy, thyroid: no enlargment/tenderness/nodules, no carotid bruit and no JVD. Back:   Symmetric, no curvature. ROM normal. No CVA tenderness. Lungs:   Clear to auscultation bilaterally. Heart:  Regular rate and rhythm, S1, S2 normal, no murmur, click, rub or gallop. Abdomen:   Soft, non-tender. Bowel sounds normal. No masses,  No organomegaly. Extremities:  Lethargic cyanosis or edema. Pulses: 2+ and symmetric all extremities. Skin: Skin color, texture, turgor normal. No rashes or lesions   Lymph nodes: Cervical, supraclavicular, and axillary nodes normal.   Neurologic:  Lethargic.      Recent Results (from the past 24 hour(s))   CBC WITH AUTOMATED DIFF    Collection Time: 05/14/22  8:00 PM   Result Value Ref Range    WBC 17.2 (H) 3.6 - 11.0 K/uL    RBC 2.75 (L) 3.80 - 5.20 M/uL    HGB 7.4 (L) 11.5 - 16.0 g/dL    HCT 23.6 (L) 35.0 - 47.0 %    MCV 85.8 80.0 - 99.0 FL    MCH 26.9 26.0 - 34.0 PG    MCHC 31.4 30.0 - 36.5 g/dL    RDW 17.2 (H) 11.5 - 14.5 %    PLATELET 221 (H) 541 - 400 K/uL    MPV 10.4 8.9 - 12.9 FL    NRBC 0.2 (H) 0.0  WBC    ABSOLUTE NRBC 0.04 (H) 0.00 - 0.01 K/uL    NEUTROPHILS 70 32 - 75 %    LYMPHOCYTES 19 12 - 49 %    MONOCYTES 7 5 - 13 %    EOSINOPHILS 1 0 - 7 %    BASOPHILS 1 0 - 1 %    IMMATURE GRANULOCYTES 2 (H) 0 - 0.5 % ABS. NEUTROPHILS 12.0 (H) 1.8 - 8.0 K/UL    ABS. LYMPHOCYTES 3.3 0.8 - 3.5 K/UL    ABS. MONOCYTES 1.2 (H) 0.0 - 1.0 K/UL    ABS. EOSINOPHILS 0.2 0.0 - 0.4 K/UL    ABS. BASOPHILS 0.1 0.0 - 0.1 K/UL    ABS. IMM. GRANS. 0.3 (H) 0.00 - 0.04 K/UL    DF AUTOMATED     METABOLIC PANEL, COMPREHENSIVE    Collection Time: 05/14/22  8:00 PM   Result Value Ref Range    Sodium 138 136 - 145 mmol/L    Potassium 5.1 3.5 - 5.1 mmol/L    Chloride 106 97 - 108 mmol/L    CO2 26 21 - 32 mmol/L    Anion gap 6 5 - 15 mmol/L    Glucose 168 (H) 65 - 100 mg/dL    BUN 45 (H) 6 - 20 mg/dL    Creatinine 0.86 0.55 - 1.02 mg/dL    BUN/Creatinine ratio 52 (H) 12 - 20      GFR est AA >60 >60 ml/min/1.73m2    GFR est non-AA >60 >60 ml/min/1.73m2    Calcium 8.9 8.5 - 10.1 mg/dL    Bilirubin, total 0.3 0.2 - 1.0 mg/dL    AST (SGOT) 39 (H) 15 - 37 U/L    ALT (SGPT) 28 12 - 78 U/L    Alk.  phosphatase 105 45 - 117 U/L    Protein, total 6.7 6.4 - 8.2 g/dL    Albumin 1.8 (L) 3.5 - 5.0 g/dL    Globulin 4.9 (H) 2.0 - 4.0 g/dL    A-G Ratio 0.4 (L) 1.1 - 2.2     LACTIC ACID    Collection Time: 05/14/22  8:00 PM   Result Value Ref Range    Lactic acid 1.6 0.4 - 2.0 mmol/L   URINALYSIS W/ RFLX MICROSCOPIC    Collection Time: 05/14/22  8:08 PM   Result Value Ref Range    Color Yellow/Straw      Appearance Clear Clear      Specific gravity 1.014 1.003 - 1.030      pH (UA) 6.0 5.0 - 8.0      Protein 30 (A) Negative mg/dL    Glucose 50 (A) Negative mg/dL    Ketone Negative Negative mg/dL    Bilirubin Negative Negative      Blood Negative Negative      Urobilinogen 0.1 0.1 - 1.0 EU/dL    Nitrites Negative Negative      Leukocyte Esterase Trace (A) Negative      WBC 0-4 0 - 4 /hpf    RBC 0-5 0 - 5 /hpf    Bacteria Negative Negative /hpf    Mucus Trace /lpf   URINE MICROSCOPIC    Collection Time: 05/14/22  8:08 PM   Result Value Ref Range    WBC 4 0 - 4 /hpf    RBC 1 0 - 5 /hpf    Bacteria Rare (A) Negative /hpf   GLUCOSE, POC    Collection Time: 05/15/22  3:34 AM Result Value Ref Range    Glucose (POC) 135 (H) 65 - 117 mg/dL    Performed by [id number], POC    Collection Time: 05/15/22  7:19 AM   Result Value Ref Range    Glucose (POC) 142 (H) 65 - 117 mg/dL    Performed by Teresa Hawikns RN (Traveler)          Assessment:     Hospital Problems  Date Reviewed: 4/21/2022          Codes Class Noted POA    Sacral ulcer ([facility] ICD-10-CM: A76.075  ICD-9-CM: 707.8  5/14/2022 Unknown          Infected decubitus ulcers continue with IV vancomycin and IV Zosyn consult ID and surgery   mellitus type II  PVD  On  Multiple strokes  Plan:      Will transfer patient to Dr. Emeka Brewster service due to family standing request  Prolonged care time of 60 minutes  Signed By: Lavell Wu MD     May 15, 2022

## 2022-05-15 NOTE — ED PROVIDER NOTES
EMERGENCY DEPARTMENT HISTORY AND PHYSICAL EXAM      Date: 2022  Patient Name: Diego Sanders    History of Presenting Illness     Chief Complaint   Patient presents with    Leg Pain    Wound Check       History Provided By: Patient's Daughter    HPI: Diego Sanders, 78 y.o. female with a past medical history significant diabetes, hypertension and stroke presents to the ED with cc of family concern for worsening sacral ulcer. Patient underwent debridement on 2022 and was subsequently discharged home with p.o. antibiotics. Family is concerned that the ulcer is getting worse and white count is continuing to increase. Patient has no other complaints at this time. There are no other complaints, changes, or physical findings at this time. PCP: Elizabeth Good., MD    No current facility-administered medications on file prior to encounter. Current Outpatient Medications on File Prior to Encounter   Medication Sig Dispense Refill    cholestyramine-aspartame (QUESTRAN LIGHT) 4 gram packet Take 1 Packet by mouth two (2) times daily (with meals). 60 Each 0    flucytosine (ANCOBON) 500 mg capsule Take 1 Capsule by mouth every six (6) hours for 14 days. 56 Capsule 0    [] loperamide (IMODIUM) 2 mg capsule Take 1 Capsule by mouth three (3) times daily as needed for Diarrhea for up to 10 days. 10 Capsule 0    albuterol-ipratropium (DUO-NEB) 2.5 mg-0.5 mg/3 ml nebu 3 mL by Nebulization route every six (6) hours as needed for Wheezing or Shortness of Breath. 30 Nebule 1    hydrALAZINE (APRESOLINE) 50 mg tablet Take 1 Tablet by mouth three (3) times daily. 60 Tablet 0    metoprolol tartrate (LOPRESSOR) 50 mg tablet Take 1 Tablet by mouth two (2) times a day. 60 Tablet 0    gabapentin (NEURONTIN) 300 mg capsule Take 1 Capsule by mouth two (2) times a day. Max Daily Amount: 600 mg. 12 Capsule 0    insulin glargine (LANTUS) 100 unit/mL injection 50 Units by SubCUTAneous route daily.  10 mL 0  acidophilus-pectin, citrus 25 million cell -100 mg tab tablet Take 2 Tablets by mouth daily. 30 Tablet 0    amLODIPine (NORVASC) 5 mg tablet Take 1 Tablet by mouth daily. 30 Tablet 0    artificial saliva (MOUTH KOTE) spra Take 1 Spray by mouth three (3) times daily. 10 mL 0    latanoprost (XALATAN) 0.005 % ophthalmic solution Administer 1 Drop to right eye every evening. 10 mL 0    atorvastatin (LIPITOR) 20 mg tablet TAKE 1 TABLET BY MOUTH EVERY NIGHT 30 Tablet 5    brimonidine (ALPHAGAN) 0.2 % ophthalmic solution Administer 1 Drop to both eyes three (3) times daily.  aspirin delayed-release 81 mg tablet Take  by mouth daily.  ferrous sulfate 325 mg (65 mg iron) tablet Take 1 Tab by mouth two (2) times a day. 61 Tab 5       Past History     Past Medical History:  Past Medical History:   Diagnosis Date    Anemia     Diabetes mellitus ([facility] [address]     HTN (hypertension)     Hyperkalemia     Hyperlipidemia     Neuropathy     PAD (peripheral artery disease) (HCC)     PCI    Sciatica     Stroke ([facility] [address]     UTI (urinary tract infection)        Past Surgical History:  Past Surgical History:   Procedure Laterality Date    HX AMPUTATION Right     great toe    HX CERVICAL FUSION      HX OTHER SURGICAL Bilateral     Stent placement    HX OTHER SURGICAL      HX VASCULAR STENT Bilateral     2 in right 1 in left    HX VASCULAR STENT Bilateral        Family History:  Family History   Problem Relation Age of Onset    Cancer Mother     Diabetes Mother     Hypertension Mother        Social History:  Social History     Tobacco Use    Smoking status: Never Smoker    Smokeless tobacco: Never Used   Substance Use Topics    Alcohol use: Not Currently    Drug use: No       Allergies:  No Known Allergies      Review of Systems     Review of Systems   Unable to perform ROS: Mental status change       Physical Exam     Physical Exam  Constitutional:       Appearance: She is ill-appearing.    HENT:      Head: Normocephalic and atraumatic. Right Ear: External ear normal.      Left Ear: External ear normal.      Nose: Nose normal.      Mouth/Throat:      Mouth: Mucous membranes are moist.   Eyes:      Extraocular Movements: Extraocular movements intact. Conjunctiva/sclera: Conjunctivae normal.   Cardiovascular:      Rate and Rhythm: Normal rate and regular rhythm. Pulses: Normal pulses. Heart sounds: Normal heart sounds. Pulmonary:      Effort: Pulmonary effort is normal.      Breath sounds: Normal breath sounds. Abdominal:      General: Abdomen is flat. There is no distension. Palpations: Abdomen is soft. Tenderness: There is no abdominal tenderness. Comments: PEG   Musculoskeletal:         General: Normal range of motion. Cervical back: Normal range of motion. Skin:     General: Skin is warm and dry. Capillary Refill: Capillary refill takes less than 2 seconds. Comments: Large sacral decubitus ulcer     Neurological:      Mental Status: She is alert. Mental status is at baseline. Psychiatric:         Behavior: Behavior normal.         Lab and Diagnostic Study Results     Labs -     Recent Results (from the past 12 hour(s))   CBC WITH AUTOMATED DIFF    Collection Time: 05/14/22  8:00 PM   Result Value Ref Range    WBC 17.2 (H) 3.6 - 11.0 K/uL    RBC 2.75 (L) 3.80 - 5.20 M/uL    HGB 7.4 (L) 11.5 - 16.0 g/dL    HCT 23.6 (L) 35.0 - 47.0 %    MCV 85.8 80.0 - 99.0 FL    MCH 26.9 26.0 - 34.0 PG    MCHC 31.4 30.0 - 36.5 g/dL    RDW 17.2 (H) 11.5 - 14.5 %    PLATELET 110 (H) 765 - 400 K/uL    MPV 10.4 8.9 - 12.9 FL    NRBC 0.2 (H) 0.0  WBC    ABSOLUTE NRBC 0.04 (H) 0.00 - 0.01 K/uL    NEUTROPHILS 70 32 - 75 %    LYMPHOCYTES 19 12 - 49 %    MONOCYTES 7 5 - 13 %    EOSINOPHILS 1 0 - 7 %    BASOPHILS 1 0 - 1 %    IMMATURE GRANULOCYTES 2 (H) 0 - 0.5 %    ABS. NEUTROPHILS 12.0 (H) 1.8 - 8.0 K/UL    ABS. LYMPHOCYTES 3.3 0.8 - 3.5 K/UL    ABS.  MONOCYTES 1.2 (H) 0.0 - 1.0 K/UL    ABS. EOSINOPHILS 0.2 0.0 - 0.4 K/UL    ABS. BASOPHILS 0.1 0.0 - 0.1 K/UL    ABS. IMM. GRANS. 0.3 (H) 0.00 - 0.04 K/UL    DF AUTOMATED     METABOLIC PANEL, COMPREHENSIVE    Collection Time: 05/14/22  8:00 PM   Result Value Ref Range    Sodium 138 136 - 145 mmol/L    Potassium 5.1 3.5 - 5.1 mmol/L    Chloride 106 97 - 108 mmol/L    CO2 26 21 - 32 mmol/L    Anion gap 6 5 - 15 mmol/L    Glucose 168 (H) 65 - 100 mg/dL    BUN 45 (H) 6 - 20 mg/dL    Creatinine 0.86 0.55 - 1.02 mg/dL    BUN/Creatinine ratio 52 (H) 12 - 20      GFR est AA >60 >60 ml/min/1.73m2    GFR est non-AA >60 >60 ml/min/1.73m2    Calcium 8.9 8.5 - 10.1 mg/dL    Bilirubin, total 0.3 0.2 - 1.0 mg/dL    AST (SGOT) 39 (H) 15 - 37 U/L    ALT (SGPT) 28 12 - 78 U/L    Alk.  phosphatase 105 45 - 117 U/L    Protein, total 6.7 6.4 - 8.2 g/dL    Albumin 1.8 (L) 3.5 - 5.0 g/dL    Globulin 4.9 (H) 2.0 - 4.0 g/dL    A-G Ratio 0.4 (L) 1.1 - 2.2     LACTIC ACID    Collection Time: 05/14/22  8:00 PM   Result Value Ref Range    Lactic acid 1.6 0.4 - 2.0 mmol/L   URINALYSIS W/ RFLX MICROSCOPIC    Collection Time: 05/14/22  8:08 PM   Result Value Ref Range    Color Yellow/Straw      Appearance Clear Clear      Specific gravity 1.014 1.003 - 1.030      pH (UA) 6.0 5.0 - 8.0      Protein 30 (A) Negative mg/dL    Glucose 50 (A) Negative mg/dL    Ketone Negative Negative mg/dL    Bilirubin Negative Negative      Blood Negative Negative      Urobilinogen 0.1 0.1 - 1.0 EU/dL    Nitrites Negative Negative      Leukocyte Esterase Trace (A) Negative      WBC 0-4 0 - 4 /hpf    RBC 0-5 0 - 5 /hpf    Bacteria Negative Negative /hpf    Mucus Trace /lpf   URINE MICROSCOPIC    Collection Time: 05/14/22  8:08 PM   Result Value Ref Range    WBC 4 0 - 4 /hpf    RBC 1 0 - 5 /hpf    Bacteria Rare (A) Negative /hpf       Radiologic Studies -   @lastxrresult@  CT Results  (Last 48 hours)    None        CXR Results  (Last 48 hours)               05/14/22 1955  XR CHEST PORT Final result Impression:  No evidence of an acute cardiopulmonary process. Narrative:  XR CHEST PORT       Comparison:  4/11/2022. Single view: The lungs are clear. No pneumothorax or pleural effusion apparent. The heart size is stable. There is no evidence of acute cardiac   decompensation. Cervical fusion hardware partially visualized. Medical Decision Making   - I am the first provider for this patient. - I reviewed the vital signs, available nursing notes, past medical history, past surgical history, family history and social history. - Initial assessment performed. The patients presenting problems have been discussed, and they are in agreement with the care plan formulated and outlined with them. I have encouraged them to ask questions as they arise throughout their visit. Vital Signs-Reviewed the patient's vital signs. Patient Vitals for the past 12 hrs:   Temp Pulse Resp BP SpO2   05/14/22 2151 -- 93 22 120/63 97 %   05/14/22 1917 98.3 °F (36.8 °C) 91 20 (!) 137/55 96 %       Records Reviewed: Nursing Notes    The patient presents for evaluation of worsening sacral ulcer      ED Course:          Provider Notes (Medical Decision Making):   53-year-old female with history of diabetes, hypertension, CVA with sacral ulcer presenting to the ED with her family for evaluation of worsening of her sacral ulcer. Patient has reportedly completed p.o. course of antibiotics at home, however family believes that her ulcer is getting worse. Laboratory evaluation demonstrating a leukocytosis of 17,000 which is increased when compared to 10 days prior. Patient vital signs within normal limits and laboratory evaluation without other significant abnormality. Will obtain wound culture and initiate broad-spectrum antibiotics for possible failed outpatient treatment.   MDM       Procedures   Medical Decision Makingedical Decision Making  Performed by: Dominique Hines DO  PROCEDURES:  Procedures       Disposition   Disposition: Admitted to Floor Medical Floor the case was discussed with the admitting physician Emerson    Admitted      Diagnosis     Clinical Impression:   1. Skin ulcer of sacrum, unspecified ulcer stage (HCC)    2. Leukocytosis, unspecified type        Attestations:    Gurpreet Carmen, DO    Please note that this dictation was completed with [employer], the computer voice recognition software. Quite often unanticipated grammatical, syntax, homophones, and other interpretive errors are inadvertently transcribed by the computer software. Please disregard these errors. Please excuse any errors that have escaped final proofreading. Thank you.

## 2022-05-15 NOTE — PROGRESS NOTES
Vancomycin Dosing Consult  Day #1 of vancomycin therapy  Consult ordered by Dr. Marlena Wells for this 78 y.o. female for indication of decubitus ulcer infection. Antibiotic regimen: Vancomycin + Zerbaxa    Temp (24hrs), Av.9 °F (37.2 °C), Min:98.1 °F (36.7 °C), Max:99.7 °F (37.6 °C)    Recent Labs     22   WBC 17.2*   CREA 0.86   BUN 45*     Est CrCl: ~60-65 ml/min  Concomitant nephrotoxic drugs: None    Cultures:    Wound:  Moderate MDR Pseudomonas aeruginosa (susceptible to Zerbaxa, aminoglycosides), moderate mixed skin yasmine, moderate mixed enteric yasmine including yeast, final   Blood: Pending   Wound: Pending    MRSA Swab: Not ordered, patient already received first dose of vancomycin    Target range: AUC/GEMA 400-600    Assessment/Plan:   · MDR Pseudomonas aeruginosa present in wound culture from recent admission  · Afebrile, leukocytosis  · No new SCr today, appears to be close to baseline  · Will continue therapy with a dose of 750 mg q12h for a predicted AUC of 489  · Will check a level prior to dose  @ 1500  · Antimicrobial stop date TBD

## 2022-05-15 NOTE — PROGRESS NOTES
Comprehensive Nutrition Assessment    Type and Reason for Visit: Consult    Nutrition Recommendations/Plan:   Modify EN to Glucerna 1.5 @60ml/hr; 175ml flush Q4hrs   to provide 2160kcal, 119g pro, 2140ml total free H2O    Consider pro supplement if wound healing stalls     Malnutrition Assessment:  Malnutrition Status:  Severe malnutrition (05/15/22 1324)    Context:  Chronic illness     Findings of the 6 clinical characteristics of malnutrition:   Energy Intake:  No significant decrease in energy intake  Weight Loss:  Greater than 5% over 1 month     Body Fat Loss:  No significant body fat loss,     Muscle Mass Loss:  Severe muscle mass loss, Clavicles (pectoralis &deltoids),Thigh (quadriceps)  Fluid Accumulation:  No significant fluid accumulation,     Strength:  Not performed         Nutrition Assessment:    Admitted for sacral p.ulcer, worsening per daughter; debrided . Daughter c/o consistent diarrhea s/p TF placement. No diarrhea since admission per RN. Last BM PTA. Wt loss of 8.3% x1week per stated current weight in EMR. Labs: BUN: 45 Cr: 0.86. G, 135, 168. Meds: amlodipine, atorvastatin, questran, lovenox, famotidine, tricor, FeSO4, ancolon, gabapentin, hydralazine, insulin, lopressor, zofran, zosyn, lax, tramadol, vancomycin    Nutrition Related Findings:    NFPE finding moderate muscle wasting in BLE. Amputated toes noted. Abdominal obesity noted. Mild edema in Rt foot. Dressings on feet noted.  Wound Type: Unstageable (sacrum unstageable s/p debridement )    Current Nutrition Intake & Therapies:  Average Meal Intake: NPO  Average Supplement Intake: NPO  Current Tube Feeding (TF) Orders:   · Feeding Route:    · Formula: Standard with fiber  · Schedule:Continuous    · Regimen: 40  · Additives/Modulars: None  · Water Flushes: 30ml Q6hrs  · Current TF & Flush Orders Provides: 1440kcal, 61g pro, 850ml total free H2O  · Goal TF & Flush Orders Provides:  2160kcal, 119g pro, 2140ml total free H2O      Anthropometric Measures:  Height: 5' 7\" (170.2 cm)  Ideal Body Weight (IBW): 135 lbs (61 kg)  Admission Body Weight: 175 lb  Current Body Wt:  79.4 kg (175 lb 0.7 oz), 129.7 % IBW. Stated  Current BMI (kg/m2): 27.4   BMI Category: Overweight (BMI 25.0-29. 9)     Wt Readings from Last 10 Encounters:   05/14/22 79.4 kg (175 lb)   04/10/22 86.6 kg (191 lb)   02/27/22 82.2 kg (181 lb 3.5 oz)   07/21/20 84.4 kg (186 lb)   02/17/20 84.8 kg (187 lb)   02/17/20 84.8 kg (187 lb)   10/25/16 81.2 kg (179 lb 1.6 oz)   07/18/16 79.8 kg (176 lb)   04/11/16 77.3 kg (170 lb 8 oz)   01/05/16 81.6 kg (180 lb)       Estimated Daily Nutrient Needs:  Energy Requirements Based On: Kcal/kg  Weight Used for Energy Requirements: Current  Energy (kcal/day): 2250kcal/d (27kcal/kg)  Weight Used for Protein Requirements: Current  Protein (g/day): 119g/d (1.5g/kg)  Method Used for Fluid Requirements: 1 ml/kcal  Fluid (ml/day): 2250ml/d (1ml/kcal)    Nutrition Diagnosis:   · Increased nutrient needs related to catabolic illness as evidenced by wounds      Nutrition Interventions:   Food and/or Nutrient Delivery: Continue NPO,Modify oral nutrition supplement  Nutrition Education/Counseling: Education not indicated  Coordination of Nutrition Care: Continue to monitor while inpatient       Goals:  Previous Goal Met: Progressing toward goal(s)  Goals: Meet at least 75% of estimated needs,Tolerate nutrition support at goal rate       Nutrition Monitoring and Evaluation:   Behavioral-Environmental Outcomes: None identified  Food/Nutrient Intake Outcomes: Enteral nutrition intake/tolerance  Physical Signs/Symptoms Outcomes: Skin,Nutrition focused physical findings,Weight,Fluid status or edema    Discharge Planning:    Enteral nutrition    Elfego Livingston RD  Contact: [phone]

## 2022-05-15 NOTE — ED NOTES
Report called to flory RN.  Pt to be transferred pending abx administration and wound culture collection

## 2022-05-15 NOTE — PROGRESS NOTES
Problem: Falls - Risk of  Goal: *Absence of Falls  Description: Document Morene Hole Fall Risk and appropriate interventions in the flowsheet. Outcome: Progressing Towards Goal  Note: Fall Risk Interventions:       Mentation Interventions: Bed/chair exit alarm                        Problem: Patient Education: Go to Patient Education Activity  Goal: Patient/Family Education  Outcome: Progressing Towards Goal     Problem: Pressure Injury - Risk of  Goal: *Prevention of pressure injury  Description: Document Shakir Scale and appropriate interventions in the flowsheet.   Outcome: Progressing Towards Goal  Note: Pressure Injury Interventions:  Sensory Interventions: Assess changes in LOC    Moisture Interventions: Absorbent underpads    Activity Interventions: Increase time out of bed    Mobility Interventions: Float heels,HOB 30 degrees or less (REPOSITION)    Nutrition Interventions: Document food/fluid/supplement intake    Friction and Shear Interventions: Foam dressings/transparent film/skin sealants,HOB 30 degrees or less,Minimize layers                Problem: Patient Education: Go to Patient Education Activity  Goal: Patient/Family Education  Outcome: Progressing Towards Goal

## 2022-05-15 NOTE — CONSULTS
Consult Date: 5/15/2022    Consults Infected sacral wound    Subjective This is a  78year old female, diabetic, well known to me from last month when followed for sacral wound infected with Pseudomonas and Candida UTI. She was discharged on oral Ciprofloxacin and Fluconazole. Labs reported from [facility] last week showed WBC 17,100. I requested a repeat sacral wound culture. She has been readmitted from [facility] after presenting with leg pain and worsening sacral wound. She was afebrile with WBC 17,200. Urinalysis was unremarkable. CXR showed clear lungs. Images reviewed by me. Blood and wound cultures were sent and patient started on Vancomycin and Zosyn. ID has been consulted for this reason. Last sacral wound culture grew Pseudomonas resistant to Zosyn and Meropenem but sensitive to Zerbaxa.     Past Medical History:   Diagnosis Date    Anemia     Chronic kidney disease     Diabetes mellitus ([address]     Hemiplegia affecting dominant side, post-stroke ([facility] [address] 05/04/2022    HTN (hypertension)     Hyperkalemia     Hyperlipidemia     Ill-defined condition     Neuropathy     [facility]ipheral [facility]     PCI    Sciatica     Stroke ([address]     UTI (urinary tract infection)       Past Surgical History:   Procedure Laterality Date    HX AMPUTATION Right     great toe    HX CERVICAL FUSION      HX GI      HX OTHER SURGICAL Bilateral     Stent placement    HX OTHER SURGICAL      HX VASCULAR STENT Bilateral     2 in right 1 in left    HX VASCULAR STENT Bilateral      Family History   Problem Relation Age of Onset    Cancer Mother     Diabetes Mother     Hypertension Mother       Social History     Tobacco Use    Smoking status: Never Smoker    Smokeless tobacco: Never Used   Substance Use Topics    Alcohol use: Not Currently       Current Facility-Administered Medications   Medication Dose Route Frequency Provider Last Rate Last Admin    Vancomycin Pharmacy Dosing   Other Rx Dosing/Monitoring María Vann DO        sodium chloride (NS) flush 5-40 mL  5-40 mL IntraVENous Q8H Clemencia JACKSON MD        sodium chloride (NS) flush 5-40 mL  5-40 mL IntraVENous PRN Donna Mckinnon MD        acetaminophen (TYLENOL) tablet 650 mg  650 mg Oral Q6H PRN Donna Mckinnon MD        Or   [facility] acetaminophen (TYLENOL) suppository 650 mg  650 mg Rectal Q6H PRN Donna Mckinnon MD        polyethylene glycol (MIRALAX) packet 17 g  17 g Oral DAILY PRN Donna Mckinnon MD        ondansetron (ZOFRAN ODT) tablet 4 mg  4 mg Oral Q8H PRN Clemencia JACKSON MD        Or    ondansetron (ZOFRAN) injection 4 mg  4 mg IntraVENous Q6H PRN Clemencia JACKSON MD        enoxaparin (LOVENOX) injection 40 mg  40 mg SubCUTAneous DAILY Clemencia JACKSON MD        famotidine (PEPCID) tablet 20 mg  20 mg Oral BID Donna Mckinnon MD        acidophilus-pectin, citrus tablet 2 Tablet  2 Tablet Oral DAILY Donna Mckinnon MD        albuterol-ipratropium (DUO-NEB) 2.5 MG-0.5 MG/3 ML  3 mL Nebulization Q6H PRN Clemencia JACKSON MD        amLODIPine (NORVASC) tablet 5 mg  5 mg Oral DAILY Clemencia JACKSON MD        artificial saliva (MOUTH KOTE) 1 Spray  1 Spray Oral TID Donna Mckinnon MD        aspirin delayed-release tablet 81 mg  81 mg Oral DAILY Clemencia JACKSON MD        atorvastatin (LIPITOR) tablet 20 mg  20 mg Oral QHS Clemencia JACKSON MD        brimonidine (ALPHAGAN) 0.2 % ophthalmic solution 1 Drop  1 Drop Both Eyes TID Clemencia JACKSON MD        cholestyramine-aspartame (QUESTRAN LIGHT) packet 4 g  4 g Oral BID WITH MEALS Clemencia JACKSON MD        fenofibrate nanocrystallized (TRICOR) tablet 48 mg  48 mg Oral DAILY Clemencia JACKSON MD        ferrous sulfate tablet 325 mg  325 mg Oral BID Clemencia JACKSON MD        flucytosine (ANCOBON) capsule 500 mg  500 mg Oral Q6H Orestes Norman MD        gabapentin (NEURONTIN) capsule 300 mg  300 mg Oral BID Donna Mckinnon MD       [facility] hydrALAZINE (APRESOLINE) tablet 50 mg  50 mg Oral TID Davy Cantu MD        insulin glargine (LANTUS) injection 50 Units  50 Units SubCUTAneous DAILY Luke Norman MD        latanoprost (XALATAN) 0.005 % ophthalmic solution 1 Drop  1 Drop Right Eye QPM Felisa JACKSON MD        metoprolol tartrate (LOPRESSOR) tablet 50 mg  50 mg Oral BID Davy Cantu MD        traMADoL Mabeline Nicole) tablet 25 mg  25 mg Oral Q12H PRN Davy Cantu MD        piperacillin-tazobactam (ZOSYN) 3.375 g in 0.9% sodium chloride (MBP/ADV) 100 mL MBP  3.375 g IntraVENous Q8H Izzysa Swartz DO 25 mL/hr at 05/15/22 0622 3.375 g at 05/15/22 1655        Review of Systems   Unable to perform ROS: Acuity of condition       Objective     Vital signs for last 24 hours:  Visit Vitals  /61 (BP 1 Location: Left upper arm, BP Patient Position: At rest)   Pulse 95   Temp 98.1 °F (36.7 °C)   Resp 16   Ht 5' 7\" (1.702 m)   Wt 175 lb (79.4 kg)   SpO2 96%   Breastfeeding No   BMI 27.41 kg/m²       Intake/Output this shift:  Current Shift: No intake/output data recorded. Last 3 Shifts: 05/13 1901 - 05/15 0700  In: 220 [I.V.:220]  Out: 700 [Urine:700]    Data Review:   Recent Results (from the past 24 hour(s))   CBC WITH AUTOMATED DIFF    Collection Time: 05/14/22  8:00 PM   Result Value Ref Range    WBC 17.2 (H) 3.6 - 11.0 K/uL    RBC 2.75 (L) 3.80 - 5.20 M/uL    HGB 7.4 (L) 11.5 - 16.0 g/dL    HCT 23.6 (L) 35.0 - 47.0 %    MCV 85.8 80.0 - 99.0 FL    MCH 26.9 26.0 - 34.0 PG    MCHC 31.4 30.0 - 36.5 g/dL    RDW 17.2 (H) 11.5 - 14.5 %    PLATELET 882 (H) 098 - 400 K/uL    MPV 10.4 8.9 - 12.9 FL    NRBC 0.2 (H) 0.0  WBC    ABSOLUTE NRBC 0.04 (H) 0.00 - 0.01 K/uL    NEUTROPHILS 70 32 - 75 %    LYMPHOCYTES 19 12 - 49 %    MONOCYTES 7 5 - 13 %    EOSINOPHILS 1 0 - 7 %    BASOPHILS 1 0 - 1 %    IMMATURE GRANULOCYTES 2 (H) 0 - 0.5 %    ABS. NEUTROPHILS 12.0 (H) 1.8 - 8.0 K/UL    ABS. LYMPHOCYTES 3.3 0.8 - 3.5 K/UL    ABS.  MONOCYTES 1.2 (H) 0.0 - 1.0 K/UL    ABS. EOSINOPHILS 0.2 0.0 - 0.4 K/UL    ABS. BASOPHILS 0.1 0.0 - 0.1 K/UL    ABS. IMM. GRANS. 0.3 (H) 0.00 - 0.04 K/UL    DF AUTOMATED     METABOLIC PANEL, COMPREHENSIVE    Collection Time: 05/14/22  8:00 PM   Result Value Ref Range    Sodium 138 136 - 145 mmol/L    Potassium 5.1 3.5 - 5.1 mmol/L    Chloride 106 97 - 108 mmol/L    CO2 26 21 - 32 mmol/L    Anion gap 6 5 - 15 mmol/L    Glucose 168 (H) 65 - 100 mg/dL    BUN 45 (H) 6 - 20 mg/dL    Creatinine 0.86 0.55 - 1.02 mg/dL    BUN/Creatinine ratio 52 (H) 12 - 20      GFR est AA >60 >60 ml/min/1.73m2    GFR est non-AA >60 >60 ml/min/1.73m2    Calcium 8.9 8.5 - 10.1 mg/dL    Bilirubin, total 0.3 0.2 - 1.0 mg/dL    AST (SGOT) 39 (H) 15 - 37 U/L    ALT (SGPT) 28 12 - 78 U/L    Alk.  phosphatase 105 45 - 117 U/L    Protein, total 6.7 6.4 - 8.2 g/dL    Albumin 1.8 (L) 3.5 - 5.0 g/dL    Globulin 4.9 (H) 2.0 - 4.0 g/dL    A-G Ratio 0.4 (L) 1.1 - 2.2     LACTIC ACID    Collection Time: 05/14/22  8:00 PM   Result Value Ref Range    Lactic acid 1.6 0.4 - 2.0 mmol/L   URINALYSIS W/ RFLX MICROSCOPIC    Collection Time: 05/14/22  8:08 PM   Result Value Ref Range    Color Yellow/Straw      Appearance Clear Clear      Specific gravity 1.014 1.003 - 1.030      pH (UA) 6.0 5.0 - 8.0      Protein 30 (A) Negative mg/dL    Glucose 50 (A) Negative mg/dL    Ketone Negative Negative mg/dL    Bilirubin Negative Negative      Blood Negative Negative      Urobilinogen 0.1 0.1 - 1.0 EU/dL    Nitrites Negative Negative      Leukocyte Esterase Trace (A) Negative      WBC 0-4 0 - 4 /hpf    RBC 0-5 0 - 5 /hpf    Bacteria Negative Negative /hpf    Mucus Trace /lpf   URINE MICROSCOPIC    Collection Time: 05/14/22  8:08 PM   Result Value Ref Range    WBC 4 0 - 4 /hpf    RBC 1 0 - 5 /hpf    Bacteria Rare (A) Negative /hpf   GLUCOSE, POC    Collection Time: 05/15/22  3:34 AM   Result Value Ref Range    Glucose (POC) 135 (H) 65 - 117 mg/dL    Performed by Norbert Villalobos GLUCOSE, POC    Collection Time: 05/15/22  7:19 AM   Result Value Ref Range    Glucose (POC) 142 (H) 65 - 117 mg/dL    Performed by Adebayo Groves RN (Traveler)      CXR (5/14)        Physical Exam  Vitals and nursing note reviewed. Constitutional:       General: She is not in acute distress. Appearance: She is ill-appearing. HENT:      Head: Normocephalic and atraumatic. Nose: Nose normal.      Mouth/Throat:      Pharynx: Oropharynx is clear. Eyes:      Pupils: Pupils are equal, round, and reactive to light. Cardiovascular:      Rate and Rhythm: Normal rate and regular rhythm. Heart sounds: No murmur heard. Pulmonary:      Effort: Pulmonary effort is normal.      Breath sounds: Normal breath sounds. Abdominal:      General: Bowel sounds are normal.      Palpations: Abdomen is soft. Tenderness: There is no abdominal tenderness. Comments: PEG site unremarkable   Genitourinary:     Comments: Indwelling Bell with clear urine             Musculoskeletal:      Cervical back: Neck supple. Right lower leg: No edema. Left lower leg: No edema. Comments: Left foot wound with normal granulation tissue   Skin:     Findings: No rash. Comments: Stage 3 sacral wound   Neurological:      Mental Status: She is oriented to person, place, and time. Motor: Weakness present. Psychiatric:         Behavior: Behavior normal.       ASSESSMENT/PLAN    1. Sacral wound infection with recent culture growing Pseudomonas resistant to Zosyn and Meropenem  2. Sepsis with persistent leukocytosis, elevated procal and CRP  3. Left foot wound    Comment:  Concerned about persistent Pseudomonas infection of her sacral wound now resistant to Ciprofloxacin. It was resistant to Zosyn and Meropenem. It was sensitive to the aminoglycosides but I would prefer to avoid them because of potential nephrotoxicity. It was also sensitive to Zerbaxa. 1. Continue IV Vancomycin  2. Discontinue Zosyn  3. Start IV Zerbaxa   4. Follow-up blood and sacral wound cultures    Bruce Maxwell MD

## 2022-05-15 NOTE — PROGRESS NOTES
Problem: Falls - Risk of  Goal: *Absence of Falls  Description: Document Bishop Montiel Fall Risk and appropriate interventions in the flowsheet. Outcome: Progressing Towards Goal  Note: Fall Risk Interventions:       Mentation Interventions: Bed/chair exit alarm,More frequent rounding,Room close to nurse's station    Medication Interventions: Bed/chair exit alarm    Elimination Interventions: Bed/chair exit alarm,Call light in reach              Problem: Pressure Injury - Risk of  Goal: *Prevention of pressure injury  Description: Document Shakir Scale and appropriate interventions in the flowsheet.   Outcome: Progressing Towards Goal  Note: Pressure Injury Interventions:  Sensory Interventions: Assess changes in LOC,Minimize linen layers    Moisture Interventions: Absorbent underpads    Activity Interventions: PT/OT evaluation,Increase time out of bed    Mobility Interventions: HOB 30 degrees or less,PT/OT evaluation    Nutrition Interventions: Document food/fluid/supplement intake    Friction and Shear Interventions: Minimize layers

## 2022-05-15 NOTE — PROGRESS NOTES
Reason for Admission:   Leg pain reported                 RUR Score:23%           PCP: First and Last name:  Severiano Alley., MD     Name of Practice:   Are you a current patient: Yes/No:   Approximate date of last visit:    Can you do a virtual visit with your PCP:              Resources/supports as identified by patient/family:                   Top Challenges facing patient (as identified by patient/family and CM): Finances/Medication cost?                    Transportation? Support system or lack thereof? Living arrangements? Self-care/ADLs/Cognition? Current Advanced Directive/Advance Care Plan:  Full Code      Healthcare Decision Maker:   Click here to complete HealthCare Decision Makers including selection of the Healthcare Decision Maker Relationship (ie \"Primary\")        Payor Source Payor: [facility] MEDICARE / Plan: [address] [place] [address] MCR / Product Type: Managed Care Medicare /                             Plan for utilizing home health:  None at this time                   Transition of Care Plan:      Expect pt to transfer back to [facility] as a LTC resident. Attempted to reach pts ICE/daughter  to discuss DC. Message left with call back before 4pm this date.

## 2022-05-15 NOTE — PROGRESS NOTES
Midline SL power injectable placed by [facility] prior to arrival was assessed with ultrasound after no blood return. Saline flush infiltrating. Care nurse at bedside and will remove device. 05/15/22 0845   Midline Catheter Right;Cephalic   No placement date or time found. Present on Admission/Arrival: Yes  Size: 4 FR  Length (cm): (c)   Location: Right;Cephalic   Criteria for Appropriate Use Long term IV/antibiotic administration   Site Assessment Intact   Phlebitis Assessment 0   Infiltration Assessment 0   Date of Last Dressing Change 05/13/22   Dressing Status Loose; Old drainage   External Catheter Length (cm) 4 centimeters   Dressing Type Tape;Transparent   Hub Color/Line Status Purple; White   Action Taken Other (comment)  (assessed and no blood return, infiltrating saline flush)   Positive Blood Return (Site #1) No

## 2022-05-15 NOTE — ED NOTES
This RN spoke to radiologist via phone regarding request for XR confirmation of midline. Radiologist stated unable to visualize midline and thus cannot confirm placement or give okay for use. Unable to find confirmation of midline placement from past encounters. MD Stephanie Reyes made aware.  Will refrain from use of midline

## 2022-05-16 NOTE — PROGRESS NOTES
About to start blood transfusion but pt developed fever. Informed Dr. Trell Denson and wanted this nurse to give medication first and recheck temp until better then give the blood.  Will continue to monitor

## 2022-05-16 NOTE — PROGRESS NOTES
General Daily Progress Note          Patient Name:   Raissa Husain       YOB: 1945       Age:  78 y.o. 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  Cipro    Admitted again yesterday concerning for worsening of sacral ulcer    During last admission patient was seen by infectious disease and also seen by the vascular surgeon patient had debridement of wound during the last admission    Patient seen by the infectious disease yesterday    Sacral wound infection recent cultures growing Pseudomonas resistant to Zosyn and meropenem        Objective:     Visit Vitals  /63 (BP 1 Location: Left upper arm, BP Patient Position: At rest)   Pulse 95   Temp 100.4 °F (38 °C)   Resp 16   Ht 5' 7\" (1.702 m)   Wt 79.4 kg (175 lb)   SpO2 96%   Breastfeeding No   BMI 27.41 kg/m²        Recent Results (from the past 24 hour(s))   PROCALCITONIN    Collection Time: 05/15/22 12:11 PM   Result Value Ref Range    Procalcitonin 0.57 (H) 0 ng/mL   C REACTIVE PROTEIN, QT    Collection Time: 05/15/22 12:11 PM   Result Value Ref Range    C-Reactive protein 17.90 (H) 0.00 - 0.60 mg/dL   PHOSPHORUS    Collection Time: 05/15/22 12:11 PM   Result Value Ref Range    Phosphorus 5.0 (H) 2.6 - 4.7 mg/dL   GLUCOSE, POC    Collection Time: 05/15/22  6:15 PM   Result Value Ref Range    Glucose (POC) 250 (H) 65 - 117 mg/dL    Performed by [facility]    GLUCOSE, POC    Collection Time: 05/16/22  1:09 AM   Result Value Ref Range    Glucose (POC) 292 (H) 65 - 117 mg/dL    Performed by [address] [place] [address]    Collection Time: 05/16/22  6:20 AM   Result Value Ref Range    Sodium 141 136 - 145 mmol/L    Potassium 5.1 3.5 - 5.1 mmol/L    Chloride 112 (H) 97 - 108 mmol/L    CO2 24 21 - 32 mmol/L    Anion gap 5 5 - 15 mmol/L    Glucose 299 (H) 65 - 100 mg/dL    BUN 40 (H) 6 - 20 mg/dL    Creatinine 1.10 (H) 0.55 - 1.02 mg/dL    BUN/Creatinine ratio 36 (H) 12 - 20      GFR est AA 59 (L) >60 ml/min/1.73m2    GFR est non-AA 48 (L) >60 ml/min/1.73m2    Calcium 8.3 (L) 8.5 - 10.1 mg/dL    Bilirubin, total 0.3 0.2 - 1.0 mg/dL    AST (SGOT) 29 15 - 37 U/L    ALT (SGPT) 26 12 - 78 U/L    Alk. phosphatase 106 45 - 117 U/L    Protein, total 6.4 6.4 - 8.2 g/dL    Albumin 1.7 (L) 3.5 - 5.0 g/dL    Globulin 4.7 (H) 2.0 - 4.0 g/dL    A-G Ratio 0.4 (L) 1.1 - 2.2     MAGNESIUM    Collection Time: 05/16/22  6:20 AM   Result Value Ref Range    Magnesium 2.3 1.6 - 2.4 mg/dL   PHOSPHORUS    Collection Time: 05/16/22  6:20 AM   Result Value Ref Range    Phosphorus 3.8 2.6 - 4.7 mg/dL   CBC WITH AUTOMATED DIFF    Collection Time: 05/16/22  6:20 AM   Result Value Ref Range    WBC 16.6 (H) 3.6 - 11.0 K/uL    RBC 2.46 (L) 3.80 - 5.20 M/uL    HGB 6.7 (L) 11.5 - 16.0 g/dL    HCT 21.7 (L) 35.0 - 47.0 %    MCV 88.2 80.0 - 99.0 FL    MCH 27.2 26.0 - 34.0 PG    MCHC 30.9 30.0 - 36.5 g/dL    RDW 17.6 (H) 11.5 - 14.5 %    PLATELET 598 (H) 228 - 400 K/uL    MPV 10.6 8.9 - 12.9 FL    NRBC 0.2 (H) 0.0  WBC    ABSOLUTE NRBC 0.04 (H) 0.00 - 0.01 K/uL    NEUTROPHILS 78 (H) 32 - 75 %    LYMPHOCYTES 13 12 - 49 %    MONOCYTES 6 5 - 13 %    EOSINOPHILS 1 0 - 7 %    BASOPHILS 1 0 - 1 %    IMMATURE GRANULOCYTES 1 (H) 0 - 0.5 %    ABS. NEUTROPHILS 13.1 (H) 1.8 - 8.0 K/UL    ABS. LYMPHOCYTES 2.1 0.8 - 3.5 K/UL    ABS. MONOCYTES 1.0 0.0 - 1.0 K/UL    ABS. EOSINOPHILS 0.1 0.0 - 0.4 K/UL    ABS. BASOPHILS 0.1 0.0 - 0.1 K/UL    ABS. IMM.  GRANS. 0.2 (H) 0.00 - 0.04 K/UL    DF AUTOMATED     C REACTIVE PROTEIN, QT    Collection Time: 05/16/22  6:20 AM   Result Value Ref Range C-Reactive protein 18.90 (H) 0.00 - 0.60 mg/dL   PROCALCITONIN    Collection Time: 05/16/22  6:20 AM   Result Value Ref Range    Procalcitonin 0.59 (H) 0 ng/mL   GLUCOSE, POC    Collection Time: 05/16/22  7:22 AM   Result Value Ref Range    Glucose (POC) 327 (H) 65 - 117 mg/dL    Performed by Reina Woodward      [unfilled]      Review of Systems    Not a good historian e. Physical Exam:      Constitutional: Alert awake not in distress  HENT:   Head: Normocephalic and atraumatic. Eyes: Pupils are equal, round, and reactive to light. EOM are normal.   Cardiovascular: Normal rate, regular rhythm and normal heart sounds. Pulmonary/Chest: Breath sounds normal. No wheezes. No rales. Exhibits no tenderness. Abdominal: Soft. Bowel sounds are normal. There is no abdominal tenderness. There is no rebound and no guarding. Musculoskeletal: Normal range of motion. Neurological: Alert awake bedbound    Skin sacral decubitus ulcer and left foot wound    XR CHEST PORT   Final Result   No evidence of an acute cardiopulmonary process.            Recent Results (from the past 24 hour(s))   PROCALCITONIN    Collection Time: 05/15/22 12:11 PM   Result Value Ref Range    Procalcitonin 0.57 (H) 0 ng/mL   C REACTIVE PROTEIN, QT    Collection Time: 05/15/22 12:11 PM   Result Value Ref Range    C-Reactive protein 17.90 (H) 0.00 - 0.60 mg/dL   PHOSPHORUS    Collection Time: 05/15/22 12:11 PM   Result Value Ref Range    Phosphorus 5.0 (H) 2.6 - 4.7 mg/dL   GLUCOSE, POC    Collection Time: 05/15/22  6:15 PM   Result Value Ref Range    Glucose (POC) 250 (H) 65 - 117 mg/dL    Performed by NATALIE LAURA    GLUCOSE, POC    Collection Time: 05/16/22  1:09 AM   Result Value Ref Range    Glucose (POC) 292 (H) 65 - 117 mg/dL    Performed by [facility]    METABOLIC PANEL, COMPREHENSIVE    Collection Time: 05/16/22  6:20 AM   Result Value Ref Range    Sodium 141 136 - 145 mmol/L    Potassium 5.1 3.5 - 5.1 mmol/L    Chloride 112 (H) 97 - 108 mmol/L    CO2 24 21 - 32 mmol/L    Anion gap 5 5 - 15 mmol/L    Glucose 299 (H) 65 - 100 mg/dL    BUN 40 (H) 6 - 20 mg/dL    Creatinine 1.10 (H) 0.55 - 1.02 mg/dL    BUN/Creatinine ratio 36 (H) 12 - 20      GFR est AA 59 (L) >60 ml/min/1.73m2    GFR est non-AA 48 (L) >60 ml/min/1.73m2    Calcium 8.3 (L) 8.5 - 10.1 mg/dL    Bilirubin, total 0.3 0.2 - 1.0 mg/dL    AST (SGOT) 29 15 - 37 U/L    ALT (SGPT) 26 12 - 78 U/L    Alk. phosphatase 106 45 - 117 U/L    Protein, total 6.4 6.4 - 8.2 g/dL    Albumin 1.7 (L) 3.5 - 5.0 g/dL    Globulin 4.7 (H) 2.0 - 4.0 g/dL    A-G Ratio 0.4 (L) 1.1 - 2.2     MAGNESIUM    Collection Time: 05/16/22  6:20 AM   Result Value Ref Range    Magnesium 2.3 1.6 - 2.4 mg/dL   PHOSPHORUS    Collection Time: 05/16/22  6:20 AM   Result Value Ref Range    Phosphorus 3.8 2.6 - 4.7 mg/dL   CBC WITH AUTOMATED DIFF    Collection Time: 05/16/22  6:20 AM   Result Value Ref Range    WBC 16.6 (H) 3.6 - 11.0 K/uL    RBC 2.46 (L) 3.80 - 5.20 M/uL    HGB 6.7 (L) 11.5 - 16.0 g/dL    HCT 21.7 (L) 35.0 - 47.0 %    MCV 88.2 80.0 - 99.0 FL    MCH 27.2 26.0 - 34.0 PG    MCHC 30.9 30.0 - 36.5 g/dL    RDW 17.6 (H) 11.5 - 14.5 %    PLATELET 295 (H) 774 - 400 K/uL    MPV 10.6 8.9 - 12.9 FL    NRBC 0.2 (H) 0.0  WBC    ABSOLUTE NRBC 0.04 (H) 0.00 - 0.01 K/uL    NEUTROPHILS 78 (H) 32 - 75 %    LYMPHOCYTES 13 12 - 49 %    MONOCYTES 6 5 - 13 %    EOSINOPHILS 1 0 - 7 %    BASOPHILS 1 0 - 1 %    IMMATURE GRANULOCYTES 1 (H) 0 - 0.5 %    ABS. NEUTROPHILS 13.1 (H) 1.8 - 8.0 K/UL    ABS. LYMPHOCYTES 2.1 0.8 - 3.5 K/UL    ABS. MONOCYTES 1.0 0.0 - 1.0 K/UL    ABS. EOSINOPHILS 0.1 0.0 - 0.4 K/UL    ABS. BASOPHILS 0.1 0.0 - 0.1 K/UL    ABS. IMM.  GRANS. 0.2 (H) 0.00 - 0.04 K/UL    DF AUTOMATED     C REACTIVE PROTEIN, QT    Collection Time: 05/16/22  6:20 AM   Result Value Ref Range    C-Reactive protein 18.90 (H) 0.00 - 0.60 mg/dL   PROCALCITONIN    Collection Time: 05/16/22  6:20 AM   Result Value Ref Range Procalcitonin 0.59 (H) 0 ng/mL   GLUCOSE, POC    Collection Time: 05/16/22  7:22 AM   Result Value Ref Range    Glucose (POC) 327 (H) 65 - 117 mg/dL    Performed by Elena Jeffrey        Results     Procedure Component Value Units Date/Time    CULTURE, Harshil Johnins STAIN [[id number]] Collected: 05/15/22 1915    Order Status: Completed Specimen: Wound from Paula Huber Toe Updated: 05/15/22 1927    CULTURE, Harshil Cumins STAIN [[id number]] Collected: 05/14/22 6955    Order Status: Completed Specimen: Wound from Ulcer Updated: 05/16/22 0102     Special Requests: No Special Requests        GRAM STAIN Few WBCs seen         2+ Gram Negative Rods               Occasional Gram Positive Cocci in pairs            Rare Gram Positive Rods        Culture result: PENDING    CULTURE, BLOOD, PAIRED [[id number]] Collected: 05/14/22 2000    Order Status: Completed Specimen: Blood Updated: 05/16/22 0815     Special Requests: No Special Requests        Culture result: No growth 1 day       CULTURE, BLOOD [[id number]] Collected: 05/03/22 1213    Order Status: Completed Specimen: Blood Updated: 05/10/22 0748     Special Requests: --        No Special Requests  Right  Arm       Culture result: No growth 6 days              Labs:     Recent Labs     05/16/22  0620 05/14/22 2000   WBC 16.6* 17.2*   HGB 6.7* 7.4*   HCT 21.7* 23.6*   * 487*     Recent Labs     05/16/22  0620 05/15/22  1211 05/14/22 2000     --  138   K 5.1  --  5.1   *  --  106   CO2 24  --  26   BUN 40*  --  45*   CREA 1.10*  --  0.86   *  --  168*   CA 8.3*  --  8.9   MG 2.3  --   --    PHOS 3.8 5.0*  --      Recent Labs     05/16/22  0620 05/14/22 2000   ALT 26 28    105   TBILI 0.3 0.3   TP 6.4 6.7   ALB 1.7* 1.8*   GLOB 4.7* 4.9*     No results for input(s): INR, PTP, APTT, INREXT in the last 72 hours. No results for input(s): FE, TIBC, PSAT, FERR in the last 72 hours.    No results found for: FOL, RBCF   No results for input(s): PH, PCO2, PO2 in the last 72 hours. No results for input(s): CPK, CKNDX, TROIQ in the last 72 hours.     No lab exists for component: CPKMB  Lab Results   Component Value Date/Time    Cholesterol, total 124 03/08/2022 07:40 AM    HDL Cholesterol 30 03/08/2022 07:40 AM    LDL,Direct 79 06/28/2021 12:00 AM    LDL, calculated 44.8 03/08/2022 07:40 AM    Triglyceride 246 (H) 03/08/2022 07:40 AM    CHOL/HDL Ratio 4.1 03/08/2022 07:40 AM     Lab Results   Component Value Date/Time    Glucose (POC) 327 (H) 05/16/2022 07:22 AM    Glucose (POC) 292 (H) 05/16/2022 01:09 AM    Glucose (POC) 250 (H) 05/15/2022 06:15 PM    Glucose (POC) 142 (H) 05/15/2022 07:19 AM    Glucose (POC) 135 (H) 05/15/2022 03:34 AM     Lab Results   Component Value Date/Time    Color Yellow/Straw 05/14/2022 08:08 PM    Appearance Clear 05/14/2022 08:08 PM    Specific gravity 1.014 05/14/2022 08:08 PM    pH (UA) 6.0 05/14/2022 08:08 PM    Protein 30 (A) 05/14/2022 08:08 PM    Glucose 50 (A) 05/14/2022 08:08 PM    Ketone Negative 05/14/2022 08:08 PM    Bilirubin Negative 05/14/2022 08:08 PM    Urobilinogen 0.1 05/14/2022 08:08 PM    Nitrites Negative 05/14/2022 08:08 PM    Leukocyte Esterase Trace (A) 05/14/2022 08:08 PM    Bacteria Negative 05/14/2022 08:08 PM    Bacteria Rare (A) 05/14/2022 08:08 PM    WBC 0-4 05/14/2022 08:08 PM    WBC 4 05/14/2022 08:08 PM    RBC 0-5 05/14/2022 08:08 PM    RBC 1 05/14/2022 08:08 PM         Assessment:     Sacral decubitus ulcer with recent culture growing Pseudomonas resting for Zosyn and meropenem  Sepsis with leukocytosis elevated procalcitonin and CRP  Left foot wound  Recent removal of left great toe and second toe  CVA with PEG tube placement  History of aspiration pneumonia  History of chronic kidney disease  Hypertension  Hyperlipidemia  Anemia of chronic disease      Plan:     Continue amlodipine 5 mg daily  Aspirin 81 mg daily  Lipitor 20 mg daily  Zerbaxa grams IV every 8 hours  Cholestyramine 4 g twice a day  DVT prophylaxis with Lovenox 40 mg subcu daily  Fenofibrate 40 mg daily  Neurontin 200 mg twice a day  Hydralazine 50 mg 3 times a day  Lantus 50 units subcu daily  Vancomycin with pharmacy protocol      Follow-up with infectious disease and surgery  Monitor H&H  We will transfuse 1 unit packed RBC  Repeat the labs        Current Facility-Administered Medications:     Vancomycin Pharmacy Dosing, , Other, Rx Dosing/Monitoring, Mason Chahal W, DO    sodium chloride (NS) flush 5-40 mL, 5-40 mL, IntraVENous, Q8H, Orestes Norman MD, 10 mL at 05/16/22 0556    acetaminophen (TYLENOL) tablet 650 mg, 650 mg, Oral, Q6H PRN **OR** acetaminophen (TYLENOL) suppository 650 mg, 650 mg, Rectal, Q6H PRN, Orestes Ace MD    polyethylene glycol (MIRALAX) packet 17 g, 17 g, Oral, DAILY PRN, Orestes Ace MD    ondansetron (ZOFRAN ODT) tablet 4 mg, 4 mg, Oral, Q8H PRN **OR** ondansetron (ZOFRAN) injection 4 mg, 4 mg, IntraVENous, Q6H PRN, Orestes Norman MD    enoxaparin (LOVENOX) injection 40 mg, 40 mg, SubCUTAneous, DAILY, Orestes Norman MD, 40 mg at 05/15/22 1127    famotidine (PEPCID) tablet 20 mg, 20 mg, Oral, BID, Junior Winn MD, 20 mg at 05/15/22 2049    acidophilus-pectin, citrus tablet 2 Tablet, 2 Tablet, Oral, DAILY, Orestes Norman MD, 2 Tablet at 05/15/22 1116    albuterol-ipratropium (DUO-NEB) 2.5 MG-0.5 MG/3 ML, 3 mL, Nebulization, Q6H PRN, Orestes Norman MD    amLODIPine (NORVASC) tablet 5 mg, 5 mg, Oral, DAILY, Orestes Norman MD, 5 mg at 05/15/22 1116    artificial saliva (MOUTH KOTE) 1 Spray, 1 Spray, Oral, TID, rOestes Norman MD, 1 [place] at 05/15/22 2050    aspirin delayed-release tablet 81 mg, 81 mg, Oral, DAILY, Orestes Norman MD, 81 mg at 05/15/22 1116    atorvastatin (LIPITOR) tablet 20 mg, 20 mg, Oral, QHS, Orestes Norman MD, 20 mg at 05/15/22 2050    brimonidine (ALPHAGAN) 0.2 % ophthalmic solution 1 Drop, 1 Drop, Both Eyes, TID, Junior Winn MD, 1 Drop at 05/15/22 2051    cholestyramine-aspartame (QUESTRAN LIGHT) packet 4 g, 4 g, Oral, BID WITH MEALS, Orestes Norman MD, 4 g at 05/15/22 1647    fenofibrate nanocrystallized (TRICOR) tablet 48 mg, 48 mg, Oral, DAILY, Orestes Norman MD, 48 mg at 05/15/22 1143    ferrous sulfate tablet 325 mg, 325 mg, Oral, BID, Orestes Norman MD, 325 mg at 05/15/22 2048    flucytosine (ANCOBON) capsule 500 mg, 500 mg, Oral, Q6H, Orestes Norman MD, 500 mg at 05/16/22 0000    gabapentin (NEURONTIN) capsule 300 mg, 300 mg, Oral, BID, Orestes Norman MD, 300 mg at 05/15/22 2048    hydrALAZINE (APRESOLINE) tablet 50 mg, 50 mg, Oral, TID, Ilana JACKSON MD, 50 mg at 05/15/22 2053    insulin glargine (LANTUS) injection 50 Units, 50 Units, SubCUTAneous, DAILY, Orestes Norman MD, 50 Units at 05/15/22 1143    latanoprost (XALATAN) 0.005 % ophthalmic solution 1 Drop, 1 Drop, Right Eye, QPM, Orestes Norman MD, 1 Drop at 05/15/22 2054    metoprolol tartrate (LOPRESSOR) tablet 50 mg, 50 mg, Oral, BID, Orestes Norman MD, 50 mg at 05/15/22 2048    traMADoL (ULTRAM) tablet 25 mg, 25 mg, Oral, Q12H PRN, Ilana JACKSON MD, 25 mg at 05/15/22 1647    vancomycin (VANCOCIN) 750 mg in 0.9% sodium chloride 250 mL (VIAL-MATE), 750 mg, IntraVENous, Q12H, Kg Aguero DO, Last Rate: 250 mL/hr at 05/16/22 0330, 750 mg at 05/16/22 0330    Vancomycin - Random level to be drawn prior to dose 5/16 @ 1500, , Other, ONCE, Leandro Simons WDO    ceftolozane-tazobactam (ZERBAXA) 3 g in 0.9% sodium chloride 100 mL IVPB, 3 g, IntraVENous, Q8H, Jatinder Clemons MD, 3 g at 05/16/22 5874

## 2022-05-16 NOTE — PROGRESS NOTES
Problem: Falls - Risk of  Goal: *Absence of Falls  Description: Document Dearing Fall Risk and appropriate interventions in the flowsheet.   Outcome: Progressing Towards Goal  Note: Fall Risk Interventions:       Mentation Interventions: Bed/chair exit alarm    Medication Interventions: Bed/chair exit alarm    Elimination Interventions: Bed/chair exit alarm              Problem: Patient Education: Go to Patient Education Activity  Goal: Patient/Family Education  Outcome: Progressing Towards Goal     Problem: Patient Education: Go to Patient Education Activity  Goal: Patient/Family Education  Outcome: Progressing Towards Goal

## 2022-05-16 NOTE — PROGRESS NOTES
Problem: Falls - Risk of  Goal: *Absence of Falls  Description: Document Joao Baldwin Fall Risk and appropriate interventions in the flowsheet. 5/16/2022 0431 by Karyna García RN  Outcome: Progressing Towards Goal  Note: Fall Risk Interventions:       Mentation Interventions: Bed/chair exit alarm    Medication Interventions: Bed/chair exit alarm    Elimination Interventions: Bed/chair exit alarm           5/16/2022 0430 by Karyna García RN  Outcome: Progressing Towards Goal  Note: Fall Risk Interventions:       Mentation Interventions: Bed/chair exit alarm    Medication Interventions: Bed/chair exit alarm    Elimination Interventions: Bed/chair exit alarm              Problem: Patient Education: Go to Patient Education Activity  Goal: Patient/Family Education  5/16/2022 0431 by Karyna García RN  Outcome: Progressing Towards Goal  5/16/2022 0430 by Karyna García RN  Outcome: Progressing Towards Goal     Problem: Pressure Injury - Risk of  Goal: *Prevention of pressure injury  Description: Document Shakir Scale and appropriate interventions in the flowsheet.   Outcome: Progressing Towards Goal  Note: Pressure Injury Interventions:  Sensory Interventions: Assess changes in LOC    Moisture Interventions: Absorbent underpads    Activity Interventions: PT/OT evaluation    Mobility Interventions: HOB 30 degrees or less    Nutrition Interventions: Document food/fluid/supplement intake    Friction and Shear Interventions: Minimize layers                Problem: Patient Education: Go to Patient Education Activity  Goal: Patient/Family Education  5/16/2022 0431 by Karyna García RN  Outcome: Progressing Towards Goal  5/16/2022 0430 by Karyna García RN  Outcome: Progressing Towards Goal

## 2022-05-16 NOTE — WOUND CARE
IP WOUND CONSULT    Cedric Stevens  MEDICAL RECORD NUMBER:  [id number]  AGE: 78 y.o. GENDER: female  : 1945  TODAY'S DATE:  2022    GENERAL     [] Follow-up   [x] New Consult    Cedric Stevens is a 78 y.o. female referred by:   [x] Physician  [] Nursing  [] Other:         PAST MEDICAL HISTORY    Past Medical History:   Diagnosis Date    Anemia     Chronic kidney disease     [facility]     Hemiplegia affecting dominant side, post-stroke ([facility] 2022    HTN (hypertension)     Hyperkalemia     Hyperlipidemia     Ill-defined condition     Neuropathy     PAD (peripheral artery disease) (HCC)     PCI    Sciatica     Stroke ([facility]     UTI (urinary tract infection)         PAST SURGICAL HISTORY    Past Surgical History:   Procedure Laterality Date    HX AMPUTATION Right     great toe    HX CERVICAL FUSION      HX GI      HX OTHER SURGICAL Bilateral     Stent placement    HX OTHER SURGICAL      HX VASCULAR STENT Bilateral     2 in right 1 in left    HX VASCULAR STENT Bilateral        FAMILY HISTORY    Family History   Problem Relation Age of Onset    Cancer Mother     Diabetes Mother     Hypertension Mother          ALLERGIES    No Known Allergies    MEDICATIONS    No current facility-administered medications on file prior to encounter. Current Outpatient Medications on File Prior to Encounter   Medication Sig Dispense Refill    traMADoL (ULTRAM) 50 mg tablet Take 25 mg by mouth every twelve (12) hours as needed for Pain.  fenofibrate nanocrystallized (Tricor) 48 mg tablet Take 48 mg by mouth. Indications: excessive fat in the blood      cholestyramine-aspartame (QUESTRAN LIGHT) 4 gram packet Take 1 Packet by mouth two (2) times daily (with meals). 60 Each 0    flucytosine (ANCOBON) 500 mg capsule Take 1 Capsule by mouth every six (6) hours for 14 days.  56 Capsule 0    albuterol-ipratropium (DUO-NEB) 2.5 mg-0.5 mg/3 ml nebu 3 mL by Nebulization route every six (6) hours as needed for Wheezing or Shortness of Breath. 30 Nebule 1    hydrALAZINE (APRESOLINE) 50 mg tablet Take 1 Tablet by mouth three (3) times daily. 60 Tablet 0    metoprolol tartrate (LOPRESSOR) 50 mg tablet Take 1 Tablet by mouth two (2) times a day. 60 Tablet 0    gabapentin (NEURONTIN) 300 mg capsule Take 1 Capsule by mouth two (2) times a day. Max Daily Amount: 600 mg. 12 Capsule 0    insulin glargine (LANTUS) 100 unit/mL injection 50 Units by SubCUTAneous route daily. 10 mL 0    acidophilus-pectin, citrus 25 million cell -100 mg tab tablet Take 2 Tablets by mouth daily. 30 Tablet 0    amLODIPine (NORVASC) 5 mg tablet Take 1 Tablet by mouth daily. 30 Tablet 0    artificial saliva (MOUTH KOTE) spra Take 1 Spray by mouth three (3) times daily. 10 mL 0    latanoprost (XALATAN) 0.005 % ophthalmic solution Administer 1 Drop to right eye every evening. 10 mL 0    atorvastatin (LIPITOR) 20 mg tablet TAKE 1 TABLET BY MOUTH EVERY NIGHT 30 Tablet 5    brimonidine (ALPHAGAN) 0.2 % ophthalmic solution Administer 1 Drop to both eyes three (3) times daily.  aspirin delayed-release 81 mg tablet Take  by mouth daily.  ferrous sulfate 325 mg (65 mg iron) tablet Take 1 Tab by mouth two (2) times a day.  60 Tab 5         [unfilled]  Visit Vitals  /63 (BP 1 Location: Left upper arm, BP Patient Position: At rest)   Pulse 95   Temp 100.4 °F (38 °C)   Resp 16   Ht 5' 7\" (1.702 m)   Wt 79.4 kg (175 lb)   SpO2 96%   Breastfeeding No   BMI 27.41 kg/m²       ASSESSMENT     Wound Identification & Type: Non-healing surgical incision to sacrum/buttocks and non-healing surgical incision to left foot  Dressing change: Yes, see flow chart  Verbal consent for picture: Yes    Contributing Factors: anticoagulation therapy, diabetes, poor glucose control, chronic pressure, decreased mobility, shear force, incontinence of stool, incontinence of urine, malnutrition and Hemiplegia    Wound Toe (Comment which one) Anterior;Right Greater toe amputated 02/22/22 (Active)   Dressing Status Other (Comment) 05/15/22 1830   Cleansed Cleansed with saline 05/15/22 1830   Dressing/Treatment Open to air 05/15/22 1830   Wound Assessment Dry 05/15/22 1830   Drainage Amount None 05/15/22 1830   Wound Odor None 05/15/22 1830   Number of days: 83       Wound Buttocks 02/23/22 (Active)   Wound Etiology Other (Comment) 05/15/22 1830   Dressing Status Clean;Dry; Intact 05/16/22 0540   Cleansed Cleansed with saline 05/15/22 1830   Dressing/Treatment ABD pad;Gauze dressing/dressing sponge;Tape/Soft cloth adhesive tape 05/15/22 1830   Drainage Amount Scant 05/15/22 1830   Drainage Description Serosanguinous; Yellow; Thick;Black 05/15/22 1830   Wound Odor Moderate 05/15/22 1830   Number of days: 82       Wound Heel Left dark area (Active)   Dressing Status Clean 05/16/22 0540   Cleansed Cleansed with saline 05/15/22 1830   Dressing/Treatment Other (Comment) 05/15/22 1830   Drainage Amount None 05/15/22 1830   Wound Odor None 05/15/22 1830   Number of days: 80       Wound Toe (Comment  which one) Anterior; Left black left great toe 03/03/22 (Active)   Wound Etiology Surgical 05/15/22 1948   Dressing Status Clean;Dry; Intact 05/16/22 0540   Cleansed Cleansed with saline 05/15/22 1948   Dressing/Treatment Gauze dressing/dressing sponge;Tape/Soft cloth adhesive tape 05/15/22 1948   Drainage Amount Scant 05/15/22 1948   Drainage Description Thin;Serosanguinous; Black 05/15/22 1948   Wound Odor None 05/15/22 1948   Number of days: 74       Wound Coccyx Partial Thickness 03/09/22 (Active)   Number of days: 68       Wound Leg upper Proximal;Posterior; Left Excoriated skin under the left posterior buttock 03/13/22 (Active)   Number of days: 64       Wound Heel Right Non-blanchable Erythema 04/05/22 (Active)   Dressing Status Clean;Dry; Intact 05/16/22 0540   Cleansed Cleansed with saline 05/15/22 1830   Dressing/Treatment Other (Comment) 05/15/22 1830 Wound Assessment Other (Comment) 05/15/22 1830   Drainage Amount None 05/15/22 1830   Wound Odor None 05/15/22 1830   Number of days: 41       Incision 04/20/22 Foot Left (Active)   Wound Image   05/16/22 1118   Dressing Status New dressing applied 05/16/22 1118   Dressing Change Due 05/16/22 05/16/22 1118   Cleansed Irrigated with saline 05/16/22 1118   Dressing/Treatment ABD pad;Moist to dry;Gauze dressing/dressing sponge;Roll gauze 05/16/22 1118   Wound Length (cm) 5.5 cm 05/16/22 1118   Wound Width (cm) 7 cm 05/16/22 1118   Wound Depth (cm) 0.3 cm 05/16/22 1118   Wound Volume (cm^3) 11.55 cm^3 05/16/22 1118   Incision Assessment Devitalized tissue; Other (Comment) 05/16/22 1118   Drainage Amount None 05/16/22 1118   Wound Odor None 05/16/22 1118   Licha-Wound/Incision Assessment Denuded;Fragile 05/16/22 1118   Number of days: 26       Incision 04/21/22 Sacrum (Active)   Wound Image   05/16/22 1114   Dressing Status New dressing applied 05/16/22 1114   Dressing Change Due 05/16/22 05/16/22 1114   Cleansed Irrigated with saline 05/16/22 1114   Dressing/Treatment Moist to dry; Roll gauze;Gauze dressing/dressing sponge; Foam 05/16/22 1114   Wound Length (cm) 12.7 cm 05/16/22 1114   Wound Width (cm) 13.1 cm 05/16/22 1114   Wound Depth (cm) 2.3 cm 05/16/22 1114   Wound Volume (cm^3) 382.651 cm^3 05/16/22 1114   Incision Assessment Devitalized tissue 05/16/22 1114   Drainage Amount Small 05/16/22 1114   Drainage Description Nisha Poole 05/16/22 1114   Wound Odor None 05/16/22 1114   Licha-Wound/Incision Assessment Blanchable erythema 05/16/22 1114   Number of days: 25          PLAN     Skin Care & Pressure Relief Recommendations  Speciality bed P500 Low Air Loss  Minimize layers of linen  Turn/reposition approximately every 2 hours  Pillow wedges  Manage incontinence   Promote continence; Skin Protective lotion/cream to buttocks and sacrum daily and as needed with incontinence care  Offload heels pillows    Shakir Gastelum  Blood Glucose: 327 on 5/16/22                             Albumin:  1.7 on 5/16/22  WBCs: 16.6 on 5/16/22     Nutritionist Consulted:  Yes  Nutrition Status: Severe malnutrition    Support Surface:  P500 Low Air Loss    Physician/Provider notified:   Recommendations: Surgical incision from previous admission noted to sacrum with slough and moist eschar. Pack BID with Dakins wet/dry, see dressing order. Surgical incision, also from previous admission, noted to left foot at 1st and 2nd toes amputation site with bone exposure. Apply betadine wet/dry daily, see dressing order. Dr. Nette Ca on consult to evaluate wounds. Patient has indwelling giraldo catheter to manage  incontinence. Use foam wedge to turn q2h at 30 degree angle or more to offload sacrum. Float heels with 2-3 pillows while in bed for offloading of the heels. Maintain a pillow between BLEs when turned on side to prevent skin-to-skin pressure related injury. Maintain HOB at 30 degrees or less, if not contraindicated, to reduce pressure to buttocks and sacrum. Raise foot of bed to help prevent friction and shear injury from sliding down in the bed. Will continue to follow.       Discharge Wound Care Needs:  TBD    Teaching completed with:   [] Patient           [] Family member       [] Caregiver          [] Nursing  [] Other    Patient/Caregiver Teaching:  Level of patient/caregiver understanding able to:   [] Indicates understanding       [] Needs reinforcement  [] Unsuccessful      [] Verbal Understanding  [] Demonstrated understanding       [] No evidence of learning  [] Refused teaching         [] N/A       Electronically signed by Collette English RN on 5/16/2022 at 11:21 AM

## 2022-05-16 NOTE — PROGRESS NOTES
Vancomycin Dosing Consult  Day #2 of vancomycin therapy  Consult ordered by Dr. Rosi Felton for this 78 y.o. female for indication of Decubitus ulcer infection. Antibiotic regimen: Vancomycin + Zerbaxa    Temp (24hrs), Av.8 °F (37.7 °C), Min:98.6 °F (37 °C), Max:100.5 °F (38.1 °C)    Recent Labs     22  0620 22   WBC 16.6* 17.2*   CREA 1.10* 0.86   BUN 40* 45*     Est CrCl: 48 ml/min  Concomitant nephrotoxic drugs: None    Cultures:    Wound:  Moderate MDR Pseudomonas aeruginosa susceptible to Zerbaxa, aminoglycosides), moderate mixed skin yasmine, moderate mixed enteric yasmine including yeast, final   Blood: Pending   Wound: Pending       MRSA Swab: Not ordered, patient already received first dose of vancomycin    Target range: AUC/GEMA 400-600    Assessment/Plan:   Vancomycin level resulted 24.2  Serum Creatinine is trending up  WBC, procal and CRP still elevated  Pharmacy will change dose regimen to Vancomycin 500 mg every 12 hours starting at 0300 tomorrow and order trough level to be drawn at 1300 on 22 after 3 doses of 500 mg  Antimicrobial stop date TBD

## 2022-05-16 NOTE — PROGRESS NOTES
Called patients daughterTristen ([phone]) about discharge planning. No answer at this time. Message left to call me back. Patient from [facility] with plans to return back as of this moment. Waiting to discuss with daughter to confirm plan. 1510; spoke with daughter. Was informed that patient family was told they had to pay $200 a day while patient was in hospital and they could not afford that so they had to get her stuff from [address] [place] [address]. Patient no longer resident at that facility and would like mass referral in area sent out. Referral made in [facility].

## 2022-05-17 NOTE — PROGRESS NOTES
Progress Note    Patient: Lorraine Fu MRN: [id number]  SSN: [id number]    YOB: 1945  Age: 78 y.o. Sex: female      Admit Date: 5/14/2022    LOS: 3 days     Subjective:   Patient followed for sacral wound infection with repeat culture pending. She is afebrile with low grade temperatures but WBC, procal and CRP increasing. Patient remains largely nonverbal. Granddaughters at bedside. Objective:     Vitals:    05/16/22 2341 05/17/22 0203 05/17/22 0313 05/17/22 0749   BP: 120/64  126/66 (!) 121/59   Pulse: 89  91 92   Resp: 16  18    Temp: 99.9 °F (37.7 °C) 99.4 °F (37.4 °C) 98.3 °F (36.8 °C) 98.2 °F (36.8 °C)   SpO2: 93%  97% 92%   Weight:       Height:            Intake and Output:  Current Shift: No intake/output data recorded. Last three shifts: 05/15 1901 - 05/17 0700  In: 3764   Out: 2050 [Urine:2050]    Physical Exam:    Vitals and nursing note reviewed. Constitutional:       General: She is not in acute distress. Appearance: She is ill-appearing. HENT:      Head: Normocephalic and atraumatic. Nose: Nose normal.      Mouth/Throat:      Pharynx: Oropharynx is clear. Eyes:      Pupils: Pupils are equal, round, and reactive to light. Cardiovascular:      Rate and Rhythm: Normal rate and regular rhythm. Heart sounds: No murmur heard.       Pulmonary:      Effort: Pulmonary effort is normal.      Breath sounds: Normal breath sounds. Abdominal:      General: Bowel sounds are normal.      Palpations: Abdomen is soft. Tenderness: There is no abdominal tenderness. Comments: PEG site unremarkable   Genitourinary:     Comments: Indwelling Bell with clear urine             Musculoskeletal:      Cervical back: Neck supple. Right lower leg: No edema. Left lower leg: No edema. Comments: Left foot wound with normal granulation tissue   Skin:     Findings: No rash.              Comments: Stage 3 sacral wound   Neurological:      Mental Status: She is oriented to person, place, and time. Motor: Weakness present. Psychiatric:         Behavior: Behavior normal.     Lab/Data Review:     WBC 17,700    Procal 0.59 <0.57  CRP 18.90 <17.90    Blood cultures (5/14) Coagulase negative Staphylococci  Sacral wound (5/14) Acinetobacter baumannii sensitive to Unasyn, Cefepime, Ceftazidime  Left great toe (5/15) GNRs    Assessment:     Active Problems:    Sacral ulcer ([address] (5/14/2022)    1. Sacral wound infection secondary now to Acinetobacter baumannii and Enterococcus species, on Vancomycin and Zerbaxa  2. Sepsis with persistent leukocytosis, elevated procal and CRP  3. Left foot wound, culture pending      Comment:  All inflammatory markers increasing. Would readjust antibiotics based upon above cultures    Plan:   1. Continue IV Vancomycin  2. Discontinue Zerbaxa  3. Start IV Unasyn  4. Follow-up blood, left foot and sacral wound cultures  5.  In am, repeat CBC, CRP, procal     Signed By: Joel House MD     May 17, 2022

## 2022-05-17 NOTE — PROGRESS NOTES
Progress Note    Patient: Maddy Enriquez MRN: [id number]  SSN: [id number]    YOB: 1945  Age: 78 y.o. Sex: female      Admit Date: 5/14/2022    LOS: 3 days     Subjective:   Patient followed for sacral wound infection with repeat culture pending. She is afebrile with low grade temperatures. Objective:     Vitals:    05/16/22 2341 05/17/22 0203 05/17/22 0313 05/17/22 0749   BP: 120/64  126/66 (!) 121/59   Pulse: 89  91 92   Resp: 16  18    Temp: 99.9 °F (37.7 °C) 99.4 °F (37.4 °C) 98.3 °F (36.8 °C) 98.2 °F (36.8 °C)   SpO2: 93%  97% 92%   Weight:       Height:            Intake and Output:  Current Shift: No intake/output data recorded. Last three shifts: 05/15 1901 - 05/17 0700  In: 3764   Out: 2050 [Urine:2050]    Physical Exam:    Vitals and nursing note reviewed. Constitutional:       General: She is not in acute distress. Appearance: She is ill-appearing. HENT:      Head: Normocephalic and atraumatic. Nose: Nose normal.      Mouth/Throat:      Pharynx: Oropharynx is clear. Eyes:      Pupils: Pupils are equal, round, and reactive to light. Cardiovascular:      Rate and Rhythm: Normal rate and regular rhythm. Heart sounds: No murmur heard.       Pulmonary:      Effort: Pulmonary effort is normal.      Breath sounds: Normal breath sounds. Abdominal:      General: Bowel sounds are normal.      Palpations: Abdomen is soft. Tenderness: There is no abdominal tenderness. Comments: PEG site unremarkable   Genitourinary:     Comments: Indwelling Bell with clear urine             Musculoskeletal:      Cervical back: Neck supple. Right lower leg: No edema. Left lower leg: No edema. Comments: Left foot wound with normal granulation tissue   Skin:     Findings: No rash. Comments: Stage 3 sacral wound   Neurological:      Mental Status: She is oriented to person, place, and time. Motor: Weakness present.    Psychiatric: Behavior: Behavior normal.     Lab/Data Review:     WBC 17,700    Procal 0.59 <0.57  CRP 18.90 <17.90    Blood cultures (5/14) GPC in clusters  Sacral wound (5/14) GNRs, GPC in pairs  Left great toe (5/15) GNRs    Assessment:     Active Problems:    Sacral ulcer ([address] (5/14/2022)    1. Sacral wound infection with recent culture growing Pseudomonas resistant to Zosyn and Meropenem, on Vancomycin and Zerbaxa  2. Sepsis with persistent leukocytosis, elevated procal and CRP  3. Left foot wound, culture pending     Comment:  Sacral wound Gram stain showing multiple organisms pending identification and susceptibility. So far her clinical response is mixed with decrease in WBC but increase in procal and CRP. Left foot does not appear grossly infected. Plan:   1. Continue IV Vancomycin and Zerbaxa  2. Follow-up blood, left foot and sacral wound cultures  3.  In am, repeat CBC, CRP, procal     Signed By: Sean Zamora MD     May 17, 2022

## 2022-05-17 NOTE — PROGRESS NOTES
Patient's tube feeding changed, dressing to sacrum and b/l heels and L foot changed. Rectal tube inserted per orders.

## 2022-05-17 NOTE — CONSULTS
This is a brief consultation note on Katherine Rojas, patient's YOB: 1945. Patient is well-known to me from previous hospital encounters. Patient had sacral wound debridement and also left great and second toe amputation. Patient returned to the hospital worsening wound conditions. Recommendation was made upon discharge last time with wound VAC applications. I examined the patient's left foot. Patient has open wound on the amputation site central area wound shows some granulation tissue however wound edges are very dry and somewhat necrotic. Patient also had ERLINDA examination March 2022 done which showed right ankle index 0.57 and the left ankle-brachial index 0.53. Patient had left leg angiogram angioplasty performed at [facility] a few months ago. Sacral wound could not be assessed but pictures were reviewed. Sacral wound has certainly gotten worse as well. Last time patient had significant fecal contamination and soilage to the sacral wounds requiring rectal tube placement. Patient will need a sacral wound debridement and also diverting loop colostomy placement to prevent the fecal soilage and contamination to the sacrum. Patient will also need left leg angiogram since wound healing progress did not show any improvement. I will discuss these recommended procedures to the family.

## 2022-05-17 NOTE — PROGRESS NOTES
I examined the patient's sacral wound this morning. Wound center has necrotic tissue noted. Looks like full-thickness. Wound edge  has some granulation tissue. Patient has a rectal tube in place. Fecal contamination noted as well on the wound bed. I talked to patient's daughter this morning discussed plans on her wounds on sacrum and the foot. So we talked about couple of options including laparoscopic loop colostomy placement and sacral wound debridement. We will remove the rectal tube. and will place a wound VAC postop day #1 on the sacrum. and we also talked about addressing the wound on the left foot. Her ERLINDA on the left side 0.5 despite intervention with angioplasty earlier this year. Patient will need additional procedure with angioplasty. Meanwhile we will try to close the wound the left foot with TMA    So these things were discussed with the patient's daughter. Her second daughter from [facility] is coming tomorrow and finalize these plans and if family is agreeable I will schedule for surgery tomorrow.

## 2022-05-17 NOTE — PROGRESS NOTES
Family concerned regarding patient having frequent loose bms and requested a rectal tube preferably but said if the doctor said no to ask for immodium, Dr. Evin Yancey paged at this time, awaiting call back.

## 2022-05-17 NOTE — PROGRESS NOTES
Problem: Falls - Risk of  Goal: *Absence of Falls  Description: Document Cindia Neigh Fall Risk and appropriate interventions in the flowsheet. Outcome: Progressing Towards Goal  Note: Fall Risk Interventions:       Mentation Interventions: Bed/chair exit alarm,Door open when patient unattended,Adequate sleep, hydration, pain control,Familiar objects from home,More frequent rounding,Room close to nurse's station,Update white board    Medication Interventions: Bed/chair exit alarm    Elimination Interventions: Bed/chair exit alarm,Call light in reach              Problem: Pressure Injury - Risk of  Goal: *Prevention of pressure injury  Description: Document Shakir Scale and appropriate interventions in the flowsheet. Outcome: Progressing Towards Goal  Note: Pressure Injury Interventions:  Sensory Interventions: Assess changes in LOC,Pressure redistribution bed/mattress (bed type),Turn and reposition approx. every two hours (pillows and wedges if needed),Use 30-degree side-lying position,Minimize linen layers,Keep linens dry and wrinkle-free,Float heels,Check visual cues for pain,Avoid rigorous massage over bony prominences    Moisture Interventions: Absorbent underpads,Apply protective barrier, creams and emollients,Internal/External urinary devices,Minimize layers,Moisture barrier    Activity Interventions: Pressure redistribution bed/mattress(bed type)    Mobility Interventions: Pressure redistribution bed/mattress (bed type),Float heels,Turn and reposition approx.  every two hours(pillow and wedges)    Nutrition Interventions: Document food/fluid/supplement intake    Friction and Shear Interventions: Foam dressings/transparent film/skin sealants,Minimize layers,Lift sheet                Problem: Impaired Skin Integrity/Pressure Injury Treatment  Goal: *Improvement of Existing Pressure Injury  Note: Pressure mattress, turning/repositioning, wound care as ordered     Problem: Diabetes Self-Management  Goal: *Monitoring blood glucose, interpreting and using results  Description: Identify recommended blood glucose targets  and personal targets.   Outcome: Progressing Towards Goal  Note: Q6H BG checks, insulin as ordered

## 2022-05-17 NOTE — PROGRESS NOTES
General Daily Progress Note          Patient Name:   Rambo Jaime       YOB: 1945       Age:  78 y.o. 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 Cipro    Admitted again yesterday concerning for worsening of sacral ulcer    During last admission patient was seen by infectious disease and also seen by the vascular surgeon patient had debridement of wound during the last admission    Patient seen by the infectious disease yesterday    Sacral wound infection recent cultures growing Pseudomonas resistant to Zosyn and meropenem    5/17    Patient alert awake not in distress  Patient was seen by the vascular surgeon    Vascular surgery planned for laparoscopic loop colostomy placement and sacral debridement then wound vacuum  Also try to close the wound on the left foot with TMA      Objective:     Visit Vitals  BP (!) 121/59 (BP 1 Location: Right upper arm, BP Patient Position: Lying; At rest)   Pulse 92   Temp 98.2 °F (36.8 °C)   Resp 18   Ht 5' 7\" (1.702 m)   Wt 79.4 kg (175 lb)   SpO2 92%   Breastfeeding No   BMI 27.41 kg/m²        Recent Results (from the past 24 hour(s))   TYPE & SCREEN    Collection Time: 05/16/22 10:51 AM   Result Value Ref Range    Crossmatch Expiration 05/19/2022,2359     ABO/Rh(D) O Positive     Antibody screen Negative     Unit number [id number]     Blood component type RC LR     Unit division 00     Status of unit [address] to transfuse     Crossmatch result Compatible    GLUCOSE, POC    Collection Time: 05/16/22 11:35 AM   Result Value Ref Range    Glucose (POC) 403 (H) 65 - 117 mg/dL    Performed by NOÉ Segovia    Collection Time: 05/16/22  1:00 PM   Result Value Ref Range    Vancomycin, random 24.2 ug/mL    Reported dose date Dose Dependent      Reported dose: Dose Dependent Units   HEMOGLOBIN A1C WITH EAG    Collection Time: 05/16/22  1:00 PM   Result Value Ref Range    Hemoglobin A1c 7.7 (H) 4.0 - 5.6 %    Est. average glucose 174 mg/dL   GLUCOSE, POC    Collection Time: 05/16/22  2:59 PM   Result Value Ref Range    Glucose (POC) 376 (H) 65 - 117 mg/dL    Performed by Ibeth Foss    GLUCOSE, POC    Collection Time: 05/16/22  6:07 PM   Result Value Ref Range    Glucose (POC) 330 (H) 65 - 117 mg/dL    Performed by Ricardo Lozano    GLUCOSE, POC    Collection Time: 05/17/22 12:05 AM   Result Value Ref Range    Glucose (POC) 312 (H) 65 - 117 mg/dL    Performed by Ricardo Lozano    GLUCOSE, POC    Collection Time: 05/17/22  5:59 AM   Result Value Ref Range    Glucose (POC) 321 (H) 65 - 117 mg/dL    Performed by Ricardo Lozano    MAGNESIUM    Collection Time: 05/17/22  6:04 AM   Result Value Ref Range    Magnesium 2.6 (H) 1.6 - 2.4 mg/dL   PHOSPHORUS    Collection Time: 05/17/22  6:04 AM   Result Value Ref Range    Phosphorus 3.9 2.6 - 4.7 mg/dL   CBC W/O DIFF    Collection Time: 05/17/22  6:04 AM   Result Value Ref Range    WBC 17.7 (H) 3.6 - 11.0 K/uL    RBC 2.86 (L) 3.80 - 5.20 M/uL    HGB 7.8 (L) 11.5 - 16.0 g/dL    HCT 25.2 (L) 35.0 - 47.0 %    MCV 88.1 80.0 - 99.0 FL    MCH 27.3 26.0 - 34.0 PG    MCHC 31.0 30.0 - 36.5 g/dL    RDW 17.3 (H) 11.5 - 14.5 %    PLATELET 337 (H) 109 - 400 K/uL    MPV 10.8 8.9 - 12.9 FL    NRBC 0.6 (H) 0.0  WBC    ABSOLUTE NRBC 0.11 (H) 0.00 - 5.26 K/uL   METABOLIC PANEL, COMPREHENSIVE    Collection Time: 05/17/22  6:04 AM   Result Value Ref Range    Sodium 143 136 - 145 mmol/L    Potassium 5.3 (H) 3.5 - 5.1 mmol/L    Chloride 113 (H) 97 - 108 mmol/L    CO2 22 21 - 32 mmol/L    Anion gap 8 5 - 15 mmol/L    Glucose 301 (H) 65 - 100 mg/dL    BUN 48 (H) 6 - 20 mg/dL    Creatinine 1.21 (H) 0.55 - 1.02 mg/dL    BUN/Creatinine ratio 40 (H) 12 - 20      GFR est AA 53 (L) >60 ml/min/1.73m2    GFR est non-AA 43 (L) >60 ml/min/1.73m2    Calcium 8.7 8.5 - 10.1 mg/dL    Bilirubin, total 0.3 0.2 - 1.0 mg/dL    AST (SGOT) 21 15 - 37 U/L    ALT (SGPT) 25 12 - 78 U/L    Alk. phosphatase 101 45 - 117 U/L    Protein, total 6.5 6.4 - 8.2 g/dL    Albumin 1.6 (L) 3.5 - 5.0 g/dL    Globulin 4.9 (H) 2.0 - 4.0 g/dL    A-G Ratio 0.3 (L) 1.1 - 2.2       [unfilled]      Review of Systems    Not a good historian e. Physical Exam:      Constitutional: Alert awake not in distress  HENT:   Head: Normocephalic and atraumatic. Eyes: Pupils are equal, round, and reactive to light. EOM are normal.   Cardiovascular: Normal rate, regular rhythm and normal heart sounds. Pulmonary/Chest: Breath sounds normal. No wheezes. No rales. Exhibits no tenderness. Abdominal: Soft. Bowel sounds are normal. There is no abdominal tenderness. There is no rebound and no guarding. Musculoskeletal: Normal range of motion. Neurological: Alert awake bedbound    Skin sacral decubitus ulcer and left foot wound    XR CHEST PORT   Final Result   No evidence of an acute cardiopulmonary process.            Recent Results (from the past 24 hour(s))   TYPE & SCREEN    Collection Time: 05/16/22 10:51 AM   Result Value Ref Range    Crossmatch Expiration 05/19/2022,2359     ABO/Rh(D) O Positive     Antibody screen Negative     Unit number [id number]     Blood component type RC LR     Unit division 00     Status of unit [address] to transfuse     Crossmatch result Compatible    GLUCOSE, POC    Collection Time: 05/16/22 11:35 AM   Result Value Ref Range    Glucose (POC) 403 (H) 65 - 117 mg/dL    Performed by Presley Boogie RANDOM    Collection Time: 05/16/22  1:00 PM   Result Value Ref Range    Vancomycin, random 24.2 ug/mL    Reported dose date Dose Dependent      Reported dose: Dose Dependent Units   HEMOGLOBIN A1C WITH EAG    Collection Time: 05/16/22  1:00 PM   Result Value Ref Range    Hemoglobin A1c 7.7 (H) 4.0 - 5.6 %    Est. average glucose 174 mg/dL   GLUCOSE, POC    Collection Time: 05/16/22  2:59 PM   Result Value Ref Range    Glucose (POC) 376 (H) 65 - 117 mg/dL    Performed by Ashlie Catherine    GLUCOSE, POC    Collection Time: 05/16/22  6:07 PM   Result Value Ref Range    Glucose (POC) 330 (H) 65 - 117 mg/dL    Performed by Jeanie Andre    GLUCOSE, POC    Collection Time: 05/17/22 12:05 AM   Result Value Ref Range    Glucose (POC) 312 (H) 65 - 117 mg/dL    Performed by Jeanie Andre    GLUCOSE, POC    Collection Time: 05/17/22  5:59 AM   Result Value Ref Range    Glucose (POC) 321 (H) 65 - 117 mg/dL    Performed by Jeanie Andre    MAGNESIUM    Collection Time: 05/17/22  6:04 AM   Result Value Ref Range    Magnesium 2.6 (H) 1.6 - 2.4 mg/dL   PHOSPHORUS    Collection Time: 05/17/22  6:04 AM   Result Value Ref Range    Phosphorus 3.9 2.6 - 4.7 mg/dL   CBC W/O DIFF    Collection Time: 05/17/22  6:04 AM   Result Value Ref Range    WBC 17.7 (H) 3.6 - 11.0 K/uL    RBC 2.86 (L) 3.80 - 5.20 M/uL    HGB 7.8 (L) 11.5 - 16.0 g/dL    HCT 25.2 (L) 35.0 - 47.0 %    MCV 88.1 80.0 - 99.0 FL    MCH 27.3 26.0 - 34.0 PG    MCHC 31.0 30.0 - 36.5 g/dL    RDW 17.3 (H) 11.5 - 14.5 %    PLATELET 628 (H) 532 - 400 K/uL    MPV 10.8 8.9 - 12.9 FL    NRBC 0.6 (H) 0.0  WBC    ABSOLUTE NRBC 0.11 (H) 0.00 - 3.36 K/uL   METABOLIC PANEL, COMPREHENSIVE    Collection Time: 05/17/22  6:04 AM   Result Value Ref Range    Sodium 143 136 - 145 mmol/L    Potassium 5.3 (H) 3.5 - 5.1 mmol/L    Chloride 113 (H) 97 - 108 mmol/L    CO2 22 21 - 32 mmol/L    Anion gap 8 5 - 15 mmol/L    Glucose 301 (H) 65 - 100 mg/dL    BUN 48 (H) 6 - 20 mg/dL    Creatinine 1.21 (H) 0.55 - 1.02 mg/dL    BUN/Creatinine ratio 40 (H) 12 - 20      GFR est AA 53 (L) >60 ml/min/1.73m2    GFR est non-AA 43 (L) >60 ml/min/1.73m2    Calcium 8.7 8.5 - 10.1 mg/dL    Bilirubin, total 0.3 0.2 - 1.0 mg/dL    AST (SGOT) 21 15 - 37 U/L    ALT (SGPT) 25 12 - 78 U/L    Alk. phosphatase 101 45 - 117 U/L    Protein, total 6.5 6.4 - 8.2 g/dL    Albumin 1.6 (L) 3.5 - 5.0 g/dL    Globulin 4.9 (H) 2.0 - 4.0 g/dL    A-G Ratio 0.3 (L) 1.1 - 2.2         Results     Procedure Component Value Units Date/Time    CULTURE, Denny Durant [[id number]] Collected: 05/15/22 1915    Order Status: Completed Specimen: Wound from [facility] Toe Updated: 05/17/22 0732     Special Requests: No Special Requests        GRAM STAIN       3+ Gram Positive Rods (Coryneform)                  1+ apparent Gram Negative Rods           Culture result: Heavy Gram Negative Rods         Heavy Diphtheroids       CULTURE, Denny Durant [[id number]] Collected: 05/14/22 2355    Order Status: Completed Specimen: Wound from Ulcer Updated: 05/16/22 1320     Special Requests: No Special Requests        GRAM STAIN Few WBCs seen         2+ Gram Negative Rods               Occasional Gram Positive Cocci in pairs            Rare Gram Positive Rods        Culture result: Moderate Gram Negative Rods                  checking for possible  Mixed skin yasmine isolated      CULTURE, BLOOD, PAIRED [[id number]] Collected: 05/14/22 2000    Order Status: Completed Specimen: Blood Updated: 05/16/22 1120     Special Requests: No Special Requests        Culture result:       Gram Positive Cocci in clusters GROWING IN THE ANAEROBIC BOTTLE.                   CALLED TO AND READ BACK BY  Mendez Kellogg R.N. [id number] 05/16/2022 BY DPW      CULTURE, BLOOD [[id number]] Collected: 05/03/22 1213    Order Status: Completed Specimen: Blood Updated: 05/10/22 0748     Special Requests: --        No Special Requests  Right  Arm       Culture result: No growth 6 days              Labs:     Recent Labs     05/17/22  0604 05/16/22  0620   WBC 17.7* 16.6*   HGB 7.8* 6.7*   HCT 25.2* 21.7*   * 492*     Recent Labs     05/17/22  0604 05/16/22  0620 05/15/22  1211 05/14/22 2000    141  --  138   K 5.3* 5.1  --  5.1   * 112*  --  106   CO2 22 24  --  26   BUN 48* 40*  --  45*   CREA 1.21* 1.10*  --  0.86   * 299*  --  168*   CA 8.7 8.3*  --  8.9   MG 2.6* 2.3  --   --    PHOS 3.9 3.8 5.0*  --      Recent Labs     05/17/22  0604 05/16/22  0620 05/14/22 2000   ALT 25 26 28    106 105   TBILI 0.3 0.3 0.3   TP 6.5 6.4 6.7   ALB 1.6* 1.7* 1.8*   GLOB 4.9* 4.7* 4.9*     No results for input(s): INR, PTP, APTT, INREXT, INREXT in the last 72 hours. No results for input(s): FE, TIBC, PSAT, FERR in the last 72 hours. No results found for: FOL, RBCF   No results for input(s): PH, PCO2, PO2 in the last 72 hours. No results for input(s): CPK, CKNDX, TROIQ in the last 72 hours.     No lab exists for component: CPKMB  Lab Results   Component Value Date/Time    Cholesterol, total 124 03/08/2022 07:40 AM    HDL Cholesterol 30 03/08/2022 07:40 AM    LDL,Direct 79 06/28/2021 12:00 AM    LDL, calculated 44.8 03/08/2022 07:40 AM    Triglyceride 202 (H) 05/16/2022 06:20 AM    CHOL/HDL Ratio 4.1 03/08/2022 07:40 AM     Lab Results   Component Value Date/Time    Glucose (POC) 321 (H) 05/17/2022 05:59 AM    Glucose (POC) 312 (H) 05/17/2022 12:05 AM    Glucose (POC) 330 (H) 05/16/2022 06:07 PM    Glucose (POC) 376 (H) 05/16/2022 02:59 PM    Glucose (POC) 403 (H) 05/16/2022 11:35 AM     Lab Results   Component Value Date/Time    Color Yellow/Straw 05/14/2022 08:08 PM    Appearance Clear 05/14/2022 08:08 PM    Specific gravity 1.014 05/14/2022 08:08 PM    pH (UA) 6.0 05/14/2022 08:08 PM    Protein 30 (A) 05/14/2022 08:08 PM    Glucose 50 (A) 05/14/2022 08:08 PM    Ketone Negative 05/14/2022 08:08 PM    Bilirubin Negative 05/14/2022 08:08 PM    Urobilinogen 0.1 05/14/2022 08:08 PM    Nitrites Negative 05/14/2022 08:08 PM    Leukocyte Esterase Trace (A) 05/14/2022 08:08 PM    Bacteria Negative 05/14/2022 08:08 PM Bacteria Rare (A) 05/14/2022 08:08 PM    WBC 0-4 05/14/2022 08:08 PM    WBC 4 05/14/2022 08:08 PM    RBC 0-5 05/14/2022 08:08 PM    RBC 1 05/14/2022 08:08 PM         Assessment:     Sacral decubitus ulcer with recent culture growing Pseudomonas resting for Zosyn and meropenem  Sepsis with leukocytosis elevated procalcitonin and CRP  Left foot wound  Recent removal of left great toe and second toe  CVA with PEG tube placement  History of aspiration pneumonia  History of chronic kidney disease  Hypertension  Hyperlipidemia  Anemia of chronic disease  Hyperkalemia  Static post transfusion      Plan:     Continue amlodipine 5 mg daily  Aspirin 81 mg daily  Lipitor 20 mg daily  Zerbaxa grams IV every 8 hours  Cholestyramine 4 g twice a day  DVT prophylaxis with Lovenox 40 mg subcu daily  Fenofibrate 40 mg daily  Neurontin 200 mg twice a day  Hydralazine 50 mg 3 times a day  Lantus 50 units subcu daily  Vancomycin with pharmacy protocol  BRAD BEDOYA [facility]    Nephrology consult    Vascular surgery planned for laparoscopic loop colostomy placement and sacral debridement then wound vacuum  Also try to close the wound on the left foot with TMA      Current Facility-Administered Medications:     0.9% sodium chloride infusion 250 mL, 250 mL, IntraVENous, PRN, Dima Soto MD    sodium hypochlorite (HALF STRENGTH DAKIN'S) 0.25% irrigation (bottle), , Topical, BID, Israel Soto MD, Given at 05/16/22 2100    glucose chewable tablet 16 g, 4 Tablet, Oral, PRN, Israel Soto MD    glucagon (GLUCAGEN) injection 1 mg, 1 mg, IntraMUSCular, PRN, Israel Soto MD    insulin lispro (HUMALOG) injection, , SubCUTAneous, Q6H, Israel Soto MD, 4 Units at 05/17/22 1010    vancomycin (VANCOCIN) 500 mg in 0.9% sodium chloride (MBP/ADV) 100 mL MBP, 500 mg, IntraVENous, Q12H, Lupe Laguerre MD, Last Rate: 100 mL/hr at 05/17/22 0400, 500 mg at 05/17/22 0400    [START ON 5/18/2022] VANCOMYCIN Draw trough at 1300 on 05/18/22, , Other, Marguerite Alcala MD    Vancomycin Pharmacy Dosing, , Other, Rx Dosing/Monitoring, Isabel Loaiza DO    sodium chloride (NS) flush 5-40 mL, 5-40 mL, IntraVENous, Q8H, Orestes Norman MD, 10 mL at 05/16/22 2200    acetaminophen (TYLENOL) tablet 650 mg, 650 mg, Oral, Q6H PRN, 650 mg at 05/16/22 1454 **OR** acetaminophen (TYLENOL) suppository 650 mg, 650 mg, Rectal, Q6H PRN, Mary JACKSON MD, 650 mg at 05/16/22 2223    polyethylene glycol (MIRALAX) packet 17 g, 17 g, Oral, DAILY PRN, Mary JACKSON MD    ondansetron (ZOFRAN ODT) tablet 4 mg, 4 mg, Oral, Q8H PRN **OR** ondansetron (ZOFRAN) injection 4 mg, 4 mg, IntraVENous, Q6H PRN, Orestes Norman MD    enoxaparin (LOVENOX) injection 40 mg, 40 mg, SubCUTAneous, DAILY, Orestes Norman MD, 40 mg at 05/16/22 0943    famotidine (PEPCID) tablet 20 mg, 20 mg, Oral, BID, Brain Keller MD, 20 mg at 05/16/22 2228    acidophilus-pectin, citrus tablet 2 Tablet, 2 Tablet, Oral, DAILY, Mary JACKSON MD, 2 Tablet at 05/16/22 0942    albuterol-ipratropium (DUO-NEB) 2.5 MG-0.5 MG/3 ML, 3 mL, Nebulization, Q6H PRN, Orestes Norman MD    amLODIPine (NORVASC) tablet 5 mg, 5 mg, Oral, DAILY, Orestes Norman MD, 5 mg at 05/16/22 0943    artificial saliva (MOUTH KOTE) 1 Spray, 1 Spray, Oral, TID, Mary JACKSON MD, 1 Purcell at 05/16/22 2225    aspirin delayed-release tablet 81 mg, 81 mg, Oral, DAILY, Orestes Norman MD, 81 mg at 05/16/22 0943    atorvastatin (LIPITOR) tablet 20 mg, 20 mg, Oral, QHS, Orestes Norman MD, 20 mg at 05/16/22 2223    brimonidine (ALPHAGAN) 0.2 % ophthalmic solution 1 Drop, 1 Drop, Both Eyes, TID, Orestes Norman MD, 1 Drop at 05/16/22 2224    cholestyramine-aspartame (QUESTRAN LIGHT) packet 4 g, 4 g, Oral, BID WITH MEALS, Orestes Norman MD, 4 g at 05/16/22 1632    fenofibrate nanocrystallized (TRICOR) tablet 48 mg, 48 mg, Oral, DAILY, Orestes Norman MD, 48 mg at 05/16/22 0947    ferrous sulfate tablet 325 mg, 325 mg, Oral, BID, Cherie JACKSON MD, 325 mg at 05/16/22 2228    flucytosine (ANCOBON) capsule 500 mg, 500 mg, Oral, Q6H, Orestes Norman MD, 500 mg at 05/17/22 0600    gabapentin (NEURONTIN) capsule 300 mg, 300 mg, Oral, BID, Cherie JACKSON MD, 300 mg at 05/16/22 2228    hydrALAZINE (APRESOLINE) tablet 50 mg, 50 mg, Oral, TID, Tony Gipson MD, 50 mg at 05/16/22 2343    insulin glargine (LANTUS) injection 50 Units, 50 Units, SubCUTAneous, DAILY, Orestes Norman MD, 50 Units at 05/16/22 0943    latanoprost (XALATAN) 0.005 % ophthalmic solution 1 Drop, 1 Drop, Right Eye, QPM, Tony Gipson MD, 1 Drop at 05/16/22 1816    metoprolol tartrate (LOPRESSOR) tablet 50 mg, 50 mg, Oral, BID, Cherie JACKSON MD, 50 mg at 05/16/22 0943    traMADoL (ULTRAM) tablet 25 mg, 25 mg, Oral, Q12H PRN, Cherie JACKSON MD, 25 mg at 05/17/22 0119    ceftolozane-tazobactam (ZERBAXA) 3 g in 0.9% sodium chloride 100 mL IVPB, 3 g, IntraVENous, Q8H, Corie Sanchez MD, 3 g at 05/17/22 5889

## 2022-05-17 NOTE — CONSULTS
Consult Date: 5/17/2022    IP CONSULT TO NEPHROLOGY  Consult performed by: Gustavo Walter MD  Consult ordered by: Jeb Ruffin MD          Subjective   HISTORY OF PRESENTING ILLNESS   Ms. Jasmyne Manuel is a 79-year-old -American female with history of chronic kidney disease, CVA- aphasia, recurrent urine tract infections, diabetes mellitus type 2 with complications, sacral decubitus ulcer, history of aspiration pneumonia admitted again to the hospital because of worsening of sacral decubitus. There is apparent surgical plan for laparoscopic loop colostomy placement and sacral debridement with wound VAC. She may also need surgery for left foot PAD with TMA. I am consulted because serum sodium was elevated and creatinine is elevated. Daughter by the bedside. Patient is nonverbal and unresponsive. Tube feeds ongoing with Ensure in place.   Past Medical History:   Diagnosis Date    Anemia     Chronic kidney disease     Diabetes mellitus ([address]     Hemiplegia affecting dominant side, post-stroke ([address] 05/04/2022    HTN (hypertension)     Hyperkalemia     Hyperlipidemia     Ill-defined condition     Neuropathy     PAD (peripheral artery disease) (HCC)     PCI    Sciatica     Stroke ([address]     UTI (urinary tract infection)       Past Surgical History:   Procedure Laterality Date    HX AMPUTATION Right     great toe    HX CERVICAL FUSION      HX GI      HX OTHER SURGICAL Bilateral     Stent placement    HX OTHER SURGICAL      HX VASCULAR STENT Bilateral     2 in right 1 in left    HX VASCULAR STENT Bilateral      Family History   Problem Relation Age of Onset    Cancer Mother     Diabetes Mother     Hypertension Mother       Social History     Tobacco Use    Smoking status: Never Smoker    Smokeless tobacco: Never Used   Substance Use Topics    Alcohol use: Not Currently       Current Facility-Administered Medications   Medication Dose Route Frequency Provider Last Rate Last Admin  0.9% sodium chloride infusion 250 mL  250 mL IntraVENous PRN Roel Soto MD        sodium hypochlorite (HALF STRENGTH DAKIN'S) 0.25% irrigation (bottle)   Topical BID Israel Soto MD   Given at 05/16/22 2100    glucose chewable tablet 16 g  4 Tablet Oral PRN Roel Soto MD        glucagon (GLUCAGEN) injection 1 mg  1 mg IntraMUSCular PRN Roel Soto MD        insulin lispro (HUMALOG) injection   SubCUTAneous Q6H Israel Soto MD   4 Units at 05/17/22 9347    vancomycin (VANCOCIN) 500 mg in 0.9% sodium chloride (MBP/ADV) 100 mL MBP  500 mg IntraVENous Q12H Gail Cameron  mL/hr at 05/17/22 0400 500 mg at 05/17/22 0400    [START ON 5/18/2022] VANCOMYCIN Draw trough at 1300 on 05/18/22   Other ONCE Gail Cameron MD        Vancomycin Pharmacy Dosing   Other Rx Dosing/Monitoring Elaine VILLASEÑOR DO        sodium chloride (NS) flush 5-40 mL  5-40 mL IntraVENous Q8H Anthony JACKSON MD   10 mL at 05/16/22 2200    acetaminophen (TYLENOL) tablet 650 mg  650 mg Oral Q6H PRN Anthony JACKSON MD   650 mg at 05/16/22 1454    Or    acetaminophen (TYLENOL) suppository 650 mg  650 mg Rectal Q6H PRN Anthony JACKSON MD   650 mg at 05/16/22 2223    polyethylene glycol (MIRALAX) packet 17 g  17 g Oral DAILY PRN Anthony JACKSON MD        ondansetron (ZOFRAN ODT) tablet 4 mg  4 mg Oral Q8H PRN Anthony JACKSON MD        Or    ondansetron (ZOFRAN) injection 4 mg  4 mg IntraVENous Q6H PRN Anthony JACKSON MD        enoxaparin (LOVENOX) injection 40 mg  40 mg SubCUTAneous DAILY Anthony JACKSON MD   40 mg at 05/17/22 1006    famotidine (PEPCID) tablet 20 mg  20 mg Oral BID Anthony JACKSON MD   20 mg at 05/17/22 1006    acidophilus-pectin, citrus tablet 2 Tablet  2 Tablet Oral DAILY Melia Maxon, MD   2 Tablet at 05/17/22 1006    albuterol-ipratropium (DUO-NEB) 2.5 MG-0.5 MG/3 ML  3 mL Nebulization Q6H PRN Anthony JACKSON MD        amLODIPine (NORVASC) tablet 5 mg 5 mg Oral DAILY Matias JACKSON MD   5 mg at 05/16/22 1930    artificial saliva (MOUTH KOTE) 1 Spray  1 Spray Oral TID Matias JACKSON MD   1 [place] at 05/16/22 2225    aspirin delayed-release tablet 81 mg  81 mg Oral DAILY Matias JACKSON MD   81 mg at 05/17/22 1006    atorvastatin (LIPITOR) tablet 20 mg  20 mg Oral QHS Matias JACKSON MD   20 mg at 05/16/22 2223    brimonidine (ALPHAGAN) 0.2 % ophthalmic solution 1 Drop  1 Drop Both Eyes TID Chino Rodriguez MD   1 Drop at 05/17/22 0900    cholestyramine-aspartame (QUESTRAN LIGHT) packet 4 g  4 g Oral BID WITH MEALS Matias JACKSON MD   4 g at 05/16/22 1632    fenofibrate nanocrystallized (TRICOR) tablet 48 mg  48 mg Oral DAILY Matias JACKSON MD   48 mg at 05/17/22 1006    ferrous sulfate tablet 325 mg  325 mg Oral BID Matias JACKSON MD   325 mg at 05/17/22 1006    flucytosine (ANCOBON) capsule 500 mg  500 mg Oral Q6H Orestes Norman MD   500 mg at 05/17/22 0600    gabapentin (NEURONTIN) capsule 300 mg  300 mg Oral BID Chino Rodriguez MD   300 mg at 05/17/22 1006    hydrALAZINE (APRESOLINE) tablet 50 mg  50 mg Oral TID Chino Rodriguez MD   50 mg at 05/16/22 2343    insulin glargine (LANTUS) injection 50 Units  50 Units SubCUTAneous DAILY Chino Rodriguez MD   50 Units at 05/17/22 1033    latanoprost (XALATAN) 0.005 % ophthalmic solution 1 Drop  1 Drop Right Eye QPM Chino Rodriguez MD   1 Drop at 05/16/22 1816    metoprolol tartrate (LOPRESSOR) tablet 50 mg  50 mg Oral BID Matias JACKSON MD   50 mg at 05/16/22 0943    traMADoL (ULTRAM) tablet 25 mg  25 mg Oral Q12H PRN Matias JACKSON MD   25 mg at 05/17/22 1005    ceftolozane-tazobactam (ZERBAXA) 3 g in 0.9% sodium chloride 100 mL IVPB  3 g IntraVENous Q8H Ladoris Fake, MD   3 g at 05/17/22 9334        Review of Systems   Unable to perform ROS: Dementia       Objective     Vital signs for last 24 hours:  Visit Vitals  BP (!) 121/59 (BP 1 Location: Right upper arm, BP Patient Position: Lying; At rest)   Pulse 92   Temp 98.2 °F (36.8 °C)   Resp 18   Ht 5' 7\" (1.702 m)   Wt 79.4 kg (175 lb)   SpO2 92%   Breastfeeding No   BMI 27.41 kg/m²           Recent Results (from the past 24 hour(s))   GLUCOSE, POC    Collection Time: 05/16/22 11:35 AM   Result Value Ref Range    Glucose (POC) 403 (H) 65 - 117 mg/dL    Performed by Jennifer Moscoso, RANDOM    Collection Time: 05/16/22  1:00 PM   Result Value Ref Range    Vancomycin, random 24.2 ug/mL    Reported dose date Dose Dependent      Reported dose: Dose Dependent Units   HEMOGLOBIN A1C WITH EAG    Collection Time: 05/16/22  1:00 PM   Result Value Ref Range    Hemoglobin A1c 7.7 (H) 4.0 - 5.6 %    Est. average glucose 174 mg/dL   GLUCOSE, POC    Collection Time: 05/16/22  2:59 PM   Result Value Ref Range    Glucose (POC) 376 (H) 65 - 117 mg/dL    Performed by Shreyas Rondon    GLUCOSE, POC    Collection Time: 05/16/22  6:07 PM   Result Value Ref Range    Glucose (POC) 330 (H) 65 - 117 mg/dL    Performed by Nahum Mercedesidel    GLUCOSE, POC    Collection Time: 05/17/22 12:05 AM   Result Value Ref Range    Glucose (POC) 312 (H) 65 - 117 mg/dL    Performed by Nahum Breanna    GLUCOSE, POC    Collection Time: 05/17/22  5:59 AM   Result Value Ref Range    Glucose (POC) 321 (H) 65 - 117 mg/dL    Performed by Nahum Mercedesidel    MAGNESIUM    Collection Time: 05/17/22  6:04 AM   Result Value Ref Range    Magnesium 2.6 (H) 1.6 - 2.4 mg/dL   PHOSPHORUS    Collection Time: 05/17/22  6:04 AM   Result Value Ref Range    Phosphorus 3.9 2.6 - 4.7 mg/dL   CBC W/O DIFF    Collection Time: 05/17/22  6:04 AM   Result Value Ref Range    WBC 17.7 (H) 3.6 - 11.0 K/uL    RBC 2.86 (L) 3.80 - 5.20 M/uL    HGB 7.8 (L) 11.5 - 16.0 g/dL    HCT 25.2 (L) 35.0 - 47.0 %    MCV 88.1 80.0 - 99.0 FL    MCH 27.3 26.0 - 34.0 PG    MCHC 31.0 30.0 - 36.5 g/dL    RDW 17.3 (H) 11.5 - 14.5 %    PLATELET 081 (H) 823 - 400 K/uL    MPV 10.8 8.9 - 12.9 FL    NRBC 0.6 (H) 0.0  WBC ABSOLUTE NRBC 0.11 (H) 0.00 - 0.19 K/uL   METABOLIC PANEL, COMPREHENSIVE    Collection Time: 05/17/22  6:04 AM   Result Value Ref Range    Sodium 143 136 - 145 mmol/L    Potassium 5.3 (H) 3.5 - 5.1 mmol/L    Chloride 113 (H) 97 - 108 mmol/L    CO2 22 21 - 32 mmol/L    Anion gap 8 5 - 15 mmol/L    Glucose 301 (H) 65 - 100 mg/dL    BUN 48 (H) 6 - 20 mg/dL    Creatinine 1.21 (H) 0.55 - 1.02 mg/dL    BUN/Creatinine ratio 40 (H) 12 - 20      GFR est AA 53 (L) >60 ml/min/1.73m2    GFR est non-AA 43 (L) >60 ml/min/1.73m2    Calcium 8.7 8.5 - 10.1 mg/dL    Bilirubin, total 0.3 0.2 - 1.0 mg/dL    AST (SGOT) 21 15 - 37 U/L    ALT (SGPT) 25 12 - 78 U/L    Alk. phosphatase 101 45 - 117 U/L    Protein, total 6.5 6.4 - 8.2 g/dL    Albumin 1.6 (L) 3.5 - 5.0 g/dL    Globulin 4.9 (H) 2.0 - 4.0 g/dL    A-G Ratio 0.3 (L) 1.1 - 2.2            Intake/Output Summary (Last 24 hours) at 5/17/2022 1134  Last data filed at 5/17/2022 0557  Gross per 24 hour   Intake 2485 ml   Output 900 ml   Net 1585 ml      Current Shift: No intake/output data recorded. Last 3 Shifts: 05/15 1901 - 05/17 0700  In: 3764   Out: 2050 [Urine:2050]  Physical Exam  Constitutional:       Appearance: She is obese. She is ill-appearing. HENT:      Head: Normocephalic and atraumatic. Mouth/Throat:      Mouth: Mucous membranes are moist.   Cardiovascular:      Rate and Rhythm: Regular rhythm. Tachycardia present. Pulmonary:      Effort: Pulmonary effort is normal.   Skin:     Coloration: Skin is not jaundiced. Findings: No bruising.      Bedridden, NG tube in place ongoing tube feeds      Data Review:   Recent Results (from the past 24 hour(s))   GLUCOSE, POC    Collection Time: 05/16/22 11:35 AM   Result Value Ref Range    Glucose (POC) 403 (H) 65 - 117 mg/dL    Performed by NOÉ Hendricks    Collection Time: 05/16/22  1:00 PM   Result Value Ref Range    Vancomycin, random 24.2 ug/mL    Reported dose date Dose Dependent      Reported dose: Dose Dependent Units   HEMOGLOBIN A1C WITH EAG    Collection Time: 05/16/22  1:00 PM   Result Value Ref Range    Hemoglobin A1c 7.7 (H) 4.0 - 5.6 %    Est. average glucose 174 mg/dL   GLUCOSE, POC    Collection Time: 05/16/22  2:59 PM   Result Value Ref Range    Glucose (POC) 376 (H) 65 - 117 mg/dL    Performed by Perfecto Boom    GLUCOSE, POC    Collection Time: 05/16/22  6:07 PM   Result Value Ref Range    Glucose (POC) 330 (H) 65 - 117 mg/dL    Performed by Orlie [employer]    GLUCOSE, POC    Collection Time: 05/17/22 12:05 AM   Result Value Ref Range    Glucose (POC) 312 (H) 65 - 117 mg/dL    Performed by Orlie [employer]    GLUCOSE, POC    Collection Time: 05/17/22  5:59 AM   Result Value Ref Range    Glucose (POC) 321 (H) 65 - 117 mg/dL    Performed by Orlie [employer]    MAGNESIUM    Collection Time: 05/17/22  6:04 AM   Result Value Ref Range    Magnesium 2.6 (H) 1.6 - 2.4 mg/dL   PHOSPHORUS    Collection Time: 05/17/22  6:04 AM   Result Value Ref Range    Phosphorus 3.9 2.6 - 4.7 mg/dL   CBC W/O DIFF    Collection Time: 05/17/22  6:04 AM   Result Value Ref Range    WBC 17.7 (H) 3.6 - 11.0 K/uL    RBC 2.86 (L) 3.80 - 5.20 M/uL    HGB 7.8 (L) 11.5 - 16.0 g/dL    HCT 25.2 (L) 35.0 - 47.0 %    MCV 88.1 80.0 - 99.0 FL    MCH 27.3 26.0 - 34.0 PG    MCHC 31.0 30.0 - 36.5 g/dL    RDW 17.3 (H) 11.5 - 14.5 %    PLATELET 040 (H) 724 - 400 K/uL    MPV 10.8 8.9 - 12.9 FL    NRBC 0.6 (H) 0.0  WBC    ABSOLUTE NRBC 0.11 (H) 0.00 - 2.86 K/uL   METABOLIC PANEL, COMPREHENSIVE    Collection Time: 05/17/22  6:04 AM   Result Value Ref Range    Sodium 143 136 - 145 mmol/L    Potassium 5.3 (H) 3.5 - 5.1 mmol/L    Chloride 113 (H) 97 - 108 mmol/L    CO2 22 21 - 32 mmol/L    Anion gap 8 5 - 15 mmol/L    Glucose 301 (H) 65 - 100 mg/dL    BUN 48 (H) 6 - 20 mg/dL    Creatinine 1.21 (H) 0.55 - 1.02 mg/dL    BUN/Creatinine ratio 40 (H) 12 - 20      GFR est AA 53 (L) >60 ml/min/1.73m2    GFR est non-AA 43 (L) >60 ml/min/1.73m2    Calcium 8.7 8.5 - 10.1 mg/dL    Bilirubin, total 0.3 0.2 - 1.0 mg/dL    AST (SGOT) 21 15 - 37 U/L    ALT (SGPT) 25 12 - 78 U/L    Alk. phosphatase 101 45 - 117 U/L    Protein, total 6.5 6.4 - 8.2 g/dL    Albumin 1.6 (L) 3.5 - 5.0 g/dL    Globulin 4.9 (H) 2.0 - 4.0 g/dL    A-G Ratio 0.3 (L) 1.1 - 2.2           XR CHEST PORT   Final Result   No evidence of an acute cardiopulmonary process. Patient Active Problem List   Diagnosis Code    HTN (hypertension) I10    Hyperlipidemia E78.5    Neuropathy G62.9    Sciatica M54.30    Diabetes mellitus with neurological manifestations, uncontrolled [id number]    Infection of nailbed of toe of left foot L03.032    PAD [facility]peripheral artery disease) ([facility] I73.9    Hypercalcemia E83.52    DM type 2 causing vascular disease ([facility]) E11.59    Type 2 diabetes mellitus with nephropathy ([facility]) E11.21    Acute osteomyelitis of ankle or foot ([address] M86.179    Diabetic peripheral neuropathy ([facility] E11.42    Spinal stenosis in cervical region M48.02    Ischemia of both lower extremities I99.8    Pain in both lower legs M79.661, M79.662    CVA (cerebral vascular accident) ([address] I63.9    Elevated troponin R77.8    Hyperkalemia E87.5    UTI (urinary tract infection) N39.0    Sepsis ([facility]) A41.9    RICARDO (acute kidney injury) ([address] N17.9    AMS (altered mental status) R41.82    Sacral ulcer ([address] L98.429        DIAGNOSES:  1. Hyperkalemia, mild  2. Hyperglycemia  3. Chronic kidney disease  4. Sacral decubitus  5. CVA/dementia  DISCUSSION:   Suggest adjust tube feeds and give low potassium supplement.  Also increasing insulin may help lower potassium level.  Creatinine is at her baseline.  Agree with water flushes at this time. PLAN:   Increase insulin to help control hyperglycemia and this will help control hyperkalemia as well.  Change tube feeds to Glucerna to a low potassium tube feed   Monitor renal function        Thanks for consulting me.  Please don't hesitate to contact me if any questions arise of if I can assist in any manner. This dictation was done by dragon, computer voice recognition software. Often unanticipated grammatical, syntax, phones and other interpretive errors are inadvertently transcribed. Please excuse errors that have escaped final proofreading. Please contact me if you suspect dictation or transcription errors.   Dr Alyson Brennan  [address], [address] [facility], [facility] [facility]  Cell Phone: [phone]  Office phone: [phone]  Fax: [phone]

## 2022-05-18 NOTE — PROGRESS NOTES
TRANSFER - OUT REPORT:    Verbal report given to [facility] RN(name) on Jessenia Hudson  being transferred to ICU(unit) for routine post - op       Report consisted of patients Situation, Background, Assessment and   Recommendations(SBAR). Information from the following report(s) SBAR, Kardex, Intake/Output, MAR and Recent Results was reviewed with the receiving nurse. Lines:   Triple Lumen Femoral Line 05/18/22 Proximal;Right Femoral (Active)   Central Line Being Utilized Yes 05/18/22 1250   Criteria for Appropriate Use Hemodynamically unstable, requiring monitoring lines, vasopressors, or volume resuscitation 05/18/22 1250   Site Assessment Clean, dry, & intact 05/18/22 1250   Dressing Status Clean, dry, & intact 05/18/22 1250   Proximal Hub Color/Line Status Infusing 05/18/22 1250   Medial Hub Color/Line Status Infusing 05/18/22 1250   Positive Blood Return (Lateral Site) Yes 05/18/22 1250   Distal Hub Color/Line Status Infusing 05/18/22 1250   Positive Blood Return (Site #3) Yes 05/18/22 1250       Peripheral IV 05/14/22 Left Antecubital (Active)   Site Assessment Clean, dry, & intact 05/18/22 1250   Phlebitis Assessment 0 05/18/22 0730   Infiltration Assessment 0 05/18/22 0730   Dressing Status Clean, dry, & intact 05/18/22 1250   Dressing Type Transparent 05/17/22 1930   Hub Color/Line Status Infusing 05/18/22 1250   Action Taken Open ports on tubing capped 05/17/22 1930   Alcohol Cap Used Yes 05/17/22 1930       Peripheral IV 05/18/22 Right Antecubital (Active)   Site Assessment Clean, dry, & intact 05/18/22 1250   Dressing Status Clean, dry, & intact 05/18/22 1250   Hub Color/Line Status Infusing 05/18/22 1250       Arterial Line 05/18/22 Right Other (comment) (Active)        Opportunity for questions and clarification was provided.       Patient transported with:   O2 @ 2 liters

## 2022-05-18 NOTE — PROGRESS NOTES
Patient examined this morning and family is at bedside. We talked about the procedure scheduled today in details. Patient is a septic from sacral wound and the open wound on the left foot. Patient's white blood cell count is still is persistent 17,000. We talked about in details rational for the diverting loop colostomy, sacral wound debridement and possible closure of the wound on the left foot with TMA. Patient and family is agreeable for this procedure today.

## 2022-05-18 NOTE — PROGRESS NOTES
Spoke with primary MD regarding patient status. NPO for surgical intervention. Per MD give 25 units of the 50 unit of Lantus now prior to surgery. New order to start Lantus BID for Blood sugar control. Nurse to implement and follow closely. @ 0920 Insulin waste/partial dose 25 units of 50 units. Witnessed per o RN with primary RN    @ 0930 off unit to OR for surgical intervention.

## 2022-05-18 NOTE — PROGRESS NOTES
CM reviewed chart. Patient plan to discharge to SNF/LTC at discharge once medically stable. Several referrals have been sent for review. Patient currently off unit for surgical procedure. Will continue to monitor.     DC plan once stable currently is SNF/LTC

## 2022-05-18 NOTE — PROGRESS NOTES
Progress Note    Patient: Allyson Curtis MRN: [id number]  SSN: [id number]    YOB: 1945  Age: 78 y.o. Sex: female      Admit Date: 5/14/2022    LOS: 4 days     Subjective:   Patient followed for sacral wound infection with repeat culture growing Acinetobacter and Enterococci. Left foot wound also grew Acinetobacter. She was taken to the OR earlier today for sacral wound debridement and loop colostomy, but became hypotensive. She was placed on Levophed and intubated, now in the ICU. She is currently on Unasyn and Vancomycin. Her LFTs markedly increased and overnight she became hypotensive. Objective:     Vitals:    05/17/22 2130 05/18/22 0300 05/18/22 0717 05/18/22 0825   BP: 102/68 108/60 123/65    Pulse:  77 81    Resp:  18 16    Temp:  98.9 °F (37.2 °C) 99.1 °F (37.3 °C)    SpO2:  100% 95% 96%   Weight:       Height:            Intake and Output:  Current Shift: No intake/output data recorded. Last three shifts: 05/16 1901 - 05/18 0700  In: 6852 [I.V.:300]  Out: 1200 [Urine:1200]    Physical Exam:    Vitals and nursing note reviewed. Constitutional:       General: She is not in acute distress. Appearance: She is ill-appearing. HENT: ET Tube  Eyes: closed   Cardiovascular:      Rate and Rhythm: Normal rate and regular rhythm. Heart sounds: No murmur heard. Pulmonary: diffuse rhonchi     Effort: Pulmonary effort is normal.      Breath sounds: Normal breath sounds. Abdominal:      General: Bowel sounds are normal.      Palpations: Abdomen is soft. Tenderness: There is no abdominal tenderness. Comments: PEG site unremarkable   Genitourinary:     Comments: Indwelling Bell with clear urine             Musculoskeletal:      Cervical back: Neck supple. Right lower leg: No edema. Left lower leg: No edema. Comments: Left foot wound with normal granulation tissue   Skin:     Findings: No rash.              Comments: Stage 3 sacral wound   Neurological: intubated   Psychiatric:  intubated      Lab/Data Review:     WBC 13,900  AST >2000/ALT 1,492    Procal 0.59 <0.57  CRP 18.90 <18.90 <17.90    Blood cultures (5/14) Coagulase negative Staphylococci  Sacral wound (5/14) Acinetobacter baumannii sensitive to Unasyn, Cefepime, Ceftazidime and Enterococcus faecium  Left great toe (5/15) Acinetobacter baumannii     Assessment:     Active Problems:    Sacral ulcer ([address] (5/14/2022)    1. Sacral wound infection secondary now to Acinetobacter baumannii and Enterococcus faecium, on Vancomycin and Zerbaxa  2. Sepsis with persistent leukocytosis, elevated procal and CRP  3. Left foot wound, culture growing Acinetobacter baumannii  4. Possible ischemic hepatitiis with transaminitis following hypotensive episode  5. Positive blood cultures for Coagulase negative Staphylococci, probable contaminant    Comment:  WBC decreased slightly but CRP unchanged. Patient's blood pressure dropped to 82/50 and may explain LFT elevation. Plan:   1. Continue IV Vancomycin and IV Unasyn  2. US Abdomen  3. Follow-up blood, left foot and sacral wound cultures  4. Discontinue Flucytosine (urinalysis was normal)  5.  In am, repeat CBC, CRP, procal, hepatitis panel    Signed By: Keerthi Brice MD     May 18, 2022

## 2022-05-18 NOTE — PROGRESS NOTES
Unable to see the patient today because patient was out of room at time of my rounds. Labs are reviewed and I see the potassium is trending up. Will reach out to the dietitian- Glucerna is usually potassium rich and possibly responsible for her potassium rising. Meanwhile creatinine slightly worse. Also marked elevations in LFT and high sensitive troponin from yesterday. These are concerning findings.   Will increase the dose of Lokelma,

## 2022-05-18 NOTE — ROUTINE PROCESS
Patient blood sugar 362. Dr. Ontiveros Challenger notified, orders to give 15 units of insulin and recheck blood sugar in an hour. 15 units insulin given, blood sugar rechecked and it was 353. Will continue to monitor.

## 2022-05-18 NOTE — ANESTHESIA POSTPROCEDURE EVALUATION
Procedure(s):  Laparoscopic Loop Colostomy, Sacral Wound Debridement And Left Transmetatarsal Amputation.     general    Anesthesia Post Evaluation      Multimodal analgesia: multimodal analgesia not used between 6 hours prior to anesthesia start to PACU discharge  Patient location during evaluation: PACU  Patient participation: complete - patient participated  Level of consciousness: obtunded/minimal responses  Pain score: 0  Pain management: adequate  Airway patency: patent  Anesthetic complications: no  Cardiovascular status: acceptable  Respiratory status: acceptable, intubated and ventilator  Hydration status: acceptable  Post anesthesia nausea and vomiting:  controlled and none  Final Post Anesthesia Temperature Assessment:  Normothermia (36.0-37.5 degrees C)      INITIAL Post-op Vital signs:   Vitals Value Taken Time   /57 05/18/22 1313   Temp 36.1 °C (97 °F) 05/18/22 1313   Pulse 63 05/18/22 1313   Resp 12 05/18/22 1313   SpO2 100 % 05/18/22 1313

## 2022-05-18 NOTE — PROGRESS NOTES
Physician Progress Note      Virginie Ramos  [facility] #:                  [id number]  :                       1945  ADMIT DATE:       2022 7:13 PM  DISCH DATE:  RESPONDING  PROVIDER #:        Raj Ventura MD        QUERY TEXT:    Stage of Chronic Kidney Disease: Please provide further specificity, if known. Clinical indicators include: chronic kidney disease, bun, creatinine, bun/creatinine, gfr  Options provided:  -- Chronic kidney disease stage 1  -- Chronic kidney disease stage 2  -- Chronic kidney disease stage 3  -- Chronic kidney disease stage 3a  -- Chronic kidney disease stage 3b  -- Chronic kidney disease stage 4  -- Chronic kidney disease stage 5  -- Chronic kidney disease stage 5, requiring dialysis  -- End stage renal disease  -- Other - I will add my own diagnosis  -- Disagree - Not applicable / Not valid  -- Disagree - Clinically Unable to determine / Unknown        PROVIDER RESPONSE TEXT:    The patient has chronic kidney disease stage 3a.       Electronically signed by:  Raj Ventura MD 2022 12:28 PM

## 2022-05-18 NOTE — ROUTINE PROCESS
Shift report obtained on patient. Patient blood pressure was 82/50 and oxygen was 85% on room air. Patient was repositioned and blood pressure was retaken, new blood pressure was 124/62. Patient family was in the room and questioned whether patient had been on oxygen before, they stated when she was in ICU she was on oxygen but not on 2east. Patient placed on 3L oxygen nasal cannaula and oxygen went up to 95%. Will continue to monitor.

## 2022-05-18 NOTE — ROUTINE PROCESS
Bedside shift change report given to Tod Sprague RN (oncoming nurse) by Sanjuanita Knott RN (offgoing nurse). Report included the following information SBAR.

## 2022-05-18 NOTE — PROGRESS NOTES
Orders received, chart reviewed, patient await Sx intervention today. We will see patient after the sx. At baseline patient is bedbound and is at [facility]

## 2022-05-18 NOTE — PROGRESS NOTES
Comprehensive Nutrition Assessment    Type and Reason for Visit: Reassess (Interim)    Nutrition Recommendations/Plan:   1. Continue NPO. 2. Continue TF via PEG as Glucerna 1.5 Mahad at goal rate of 60 mL/hr. 3. Flush with 175 mL H2O Q4H via PEG. Provides 2160 kcal(97%), 119 gm PRO(100%), 2202 mL H2O(98%). 4. Please document TF rate, tolerance, BM in I/Os. Malnutrition Assessment:  Malnutrition Status:  Severe malnutrition (05/15/22 1324)    Context:  Chronic illness       Nutrition Assessment:    Admitted for sacral p.ulcer, worsening per daughter; debrided 4/21. Daughter c/o consistent diarrhea s/p TF placement. No diarrhea since admission per RN. Last BM PTA. Wt loss of 8.3% x1week per stated current weight in EMR.(5/18)Pt familiar to RD, recently D/C'd. Pt not available- went for wound debridement and closure today. TF running and tolerated at goal rate of 60 mL/hr prior to procedure today. RD rec's resuming TF and advancing to goal rate when medically appropriate. Elevated BGs likely r/t infection- on SSI. Continue regimen. Labs: K 5.3, Cl 113, Cr 1.21, BUN 48, GFR 53, POC -368 mg/dL. Meds: FerrouSul, probiotic, lipitor, unasyn, pepcid, questran, humalog, lantus, tylenol, tramadol. Nutrition Related Findings:    No N/V/D/C nor s/s of aspiration per Nsg. Remains NPO. Nonpitting BLE edema. Last BM 5/17. Wound Type: Surgical incision,Open wounds,Partial thickness (Partial Thickness- coccyx.)     Current Nutrition Intake & Therapies:  Average Meal Intake: NPO  Average Supplement Intake: NPO  ADULT TUBE FEEDING PEG; Diabetic; Delivery Method: Continuous; Continuous Initial Rate (mL/hr): 40; Continuous Advance Tube Feeding: Yes; Advancement Volume (mL/hr): 10; Advancement Frequency: Q 4 hours; Continuous Goal Rate (mL/hr): 60; Water Flu. ..   DIET NPO    Anthropometric Measures:  Height: 5' 7\" (170.2 cm)  Ideal Body Weight (IBW): 135 lbs (61 kg)  Admission Body Weight: 175 lb  Current Body Wt: 79.4 kg (175 lb 0.7 oz), 129.7 % IBW. Stated  Current BMI (kg/m2): 27.4   Weight Adjustment: No adjustment   BMI Category: Overweight (BMI 25.0-29. 9)    Estimated Daily Nutrient Needs:  Energy Requirements Based On: Kcal/kg  Weight Used for Energy Requirements: Current  Energy (kcal/day): 2223 kcal/d  Weight Used for Protein Requirements: Current  Protein (g/day): 119 gm/d  Method Used for Fluid Requirements: 1 ml/kcal  Fluid (ml/day): 2250ml/d (1ml/kcal)    Nutrition Diagnosis:   · Increased nutrient needs related to catabolic illness as evidenced by wounds    Nutrition Interventions:   Food and/or Nutrient Delivery: Continue NPO,Modify tube feeding  Nutrition Education/Counseling: Education not indicated  Coordination of Nutrition Care: Continue to monitor while inpatient,Feeding assistance/environmental change  Plan of Care discussed with: RN    Goals:  Previous Goal Met: Progressing toward goal(s)  Goals: Meet at least 75% of estimated needs,Tolerate nutrition support at goal rate,by next RD assessment,other (specify)  Specify Other Goals: Maintain BGs <180 mg/dL. Maintain CBW within +/- 0.5 kg. Nutrition Monitoring and Evaluation:   Behavioral-Environmental Outcomes: None identified  Food/Nutrient Intake Outcomes: Enteral nutrition intake/tolerance  Physical Signs/Symptoms Outcomes: Skin,Weight,Fluid status or edema,Biochemical data,Hemodynamic status    Discharge Planning:    Enteral nutrition    Fransisco Course, RD  Contact: ext. [phone] or [employer].

## 2022-05-18 NOTE — CONSULTS
CONSULTATION    REASON FOR CONSULT: elevated troponin     REQUESTING PROVIDER:  Dr. Carlyn Quiroga:    Chief Complaint   Patient presents with    Leg Pain    Wound Check         HISTORY OF PRESENT ILLNESS:  Jean Lr is a 78y.o. year-old female with past medical history significant for hypertension, hyperlipidemia, diabetes mellitus, peripheral artery disease, cerebral vascular accident (bedbound)  who was admitted to the hospital for further evaluation of leg pain and a worsening sacral wound. Today patient was scheduled for a laparoscopic loop colostomy, sacral wound debridement, and an amputation of the left transmetatarsal. Upon arrival to the OR patient was noted to be hypotensive and minimally responsive. Patient was given levophed, intubated, sedated, and transferred to the ICU for further evaluation and management. Labs showed HS troponin 890, pro-BNP 23,174, cxr with hydrostatic edema. EKG: normal sinus rhythm; heart rate 63, non-specific ST/T wave abnormalities. Records from hospital admission course thus far reviewed.           PAST MEDICAL HISTORY:    Past Medical History:   Diagnosis Date    Anemia     Chronic kidney disease     [facility] ([facility] [address]     Hemiplegia affecting dominant side, post-stroke ([facility] [address] 05/04/2022    HTN (hypertension)     Hyperkalemia     Hyperlipidemia     Ill-defined condition     Neuropathy     PAD (peripheral artery disease) (HCC)     PCI    Sciatica     Stroke (HCC)     UTI (urinary tract infection)        PAST SURGICAL HISTORY:   Past Surgical History:   Procedure Laterality Date    HX AMPUTATION Right     great toe    HX CERVICAL FUSION      HX GI      HX OTHER SURGICAL Bilateral     Stent placement    HX OTHER SURGICAL      HX VASCULAR STENT Bilateral     2 in right 1 in left    HX VASCULAR STENT Bilateral        ALLERGIES:  No Known Allergies    FAMILY HISTORY:    Family History   Problem Relation Age of Onset    Cancer Mother     Diabetes Mother     Hypertension Mother        SOCIAL HISTORY:    Tobacco: never smoker  Drugs: Not documented  ETOH: Not documented    HOME MEDICATIONS:    Prior to Admission Medications   Prescriptions Last Dose Informant Patient Reported? Taking?   acidophilus-pectin, citrus 25 million cell -100 mg tab tablet Unknown at Unknown time  No No   Sig: Take 2 Tablets by mouth daily. albuterol-ipratropium (DUO-NEB) 2.5 mg-0.5 mg/3 ml nebu Unknown at Unknown time  No No   Sig: 3 mL by Nebulization route every six (6) hours as needed for Wheezing or Shortness of Breath. amLODIPine (NORVASC) 5 mg tablet Unknown at Unknown time  No No   Sig: Take 1 Tablet by mouth daily. artificial saliva (MOUTH KOTE) spra Unknown at Unknown time  No No   Sig: Take 1 Spray by mouth three (3) times daily. aspirin delayed-release 81 mg tablet Unknown at Unknown time  Yes No   Sig: Take  by mouth daily. atorvastatin (LIPITOR) 20 mg tablet Unknown at Unknown time  No No   Sig: TAKE 1 TABLET BY MOUTH EVERY NIGHT   brimonidine (ALPHAGAN) 0.2 % ophthalmic solution Unknown at Unknown time  Yes No   Sig: Administer 1 Drop to both eyes three (3) times daily. cholestyramine-aspartame (QUESTRAN LIGHT) 4 gram packet Unknown at Unknown time  No No   Sig: Take 1 Packet by mouth two (2) times daily (with meals). fenofibrate nanocrystallized (Tricor) 48 mg tablet Unknown at Unknown time  Yes No   Sig: Take 48 mg by mouth. Indications: excessive fat in the blood   ferrous sulfate 325 mg (65 mg iron) tablet Unknown at Unknown time  No No   Sig: Take 1 Tab by mouth two (2) times a day. flucytosine (ANCOBON) 500 mg capsule Unknown at Unknown time  No No   Sig: Take 1 Capsule by mouth every six (6) hours for 14 days. gabapentin (NEURONTIN) 300 mg capsule Unknown at Unknown time  No No   Sig: Take 1 Capsule by mouth two (2) times a day.  Max Daily Amount: 600 mg.   hydrALAZINE (APRESOLINE) 50 mg tablet Unknown at Unknown time  No No Sig: Take 1 Tablet by mouth three (3) times daily. insulin glargine (LANTUS) 100 unit/mL injection Unknown at Unknown time  No No   Si Units by SubCUTAneous route daily. latanoprost (XALATAN) 0.005 % ophthalmic solution Unknown at Unknown time  No No   Sig: Administer 1 Drop to right eye every evening. metoprolol tartrate (LOPRESSOR) 50 mg tablet Unknown at Unknown time  No No   Sig: Take 1 Tablet by mouth two (2) times a day. traMADoL (ULTRAM) 50 mg tablet Unknown at Unknown time  Yes No   Sig: Take 25 mg by mouth every twelve (12) hours as needed for Pain. Facility-Administered Medications: None       REVIEW OF SYSTEMS:  Complete review of systems performed, pertinents noted above, all other systems are negative.     Patient Vitals for the past 24 hrs:   Temp Pulse Resp BP SpO2   22 1518 -- 65 12 -- 100 %   22 1350 -- 63 12 -- 100 %   22 1345 -- 63 12 (!) 110/59 100 %   22 1340 -- 63 12 (!) 110/58 --   22 1337 98.6 °F (37 °C) 60 12 (!) 110/56 100 %   22 1335 -- 63 12 (!) 110/56 --   22 1330 -- 63 12 110/60 --   22 1325 -- 63 16 (!) 111/58 100 %   22 1320 -- 64 12 (!) 98/47 100 %   22 1315 -- 64 12 108/62 100 %   22 1313 97 °F (36.1 °C) 63 12 (!) 112/57 100 %   22 1310 -- 65 14 117/61 100 %   22 1308 97 °F (36.1 °C) 66 12 (!) 134/52 100 %   22 1305 -- 63 17 131/63 100 %   22 1300 -- 63 12 (!) 110/58 100 %   22 1255 -- 63 12 (!) 109/58 100 %   22 1254 -- 63 12 -- 97 %   22 0931 -- 69 27 103/63 96 %   22 0825 -- -- -- -- 96 %   22 0717 99.1 °F (37.3 °C) 81 16 123/65 95 %   22 0300 98.9 °F (37.2 °C) 77 18 108/60 100 %   22 -- -- -- 102/68 --   22 -- -- -- 114/60 100 %   22 -- -- -- 124/62 95 %   22 99.6 °F (37.6 °C) (!) 101 18 (!) 82/50 (!) 85 %       PHYSICAL EXAMINATION:    General: sedated  HEENT: Normocephalic, PERRL, no drainage  Neck: Supple, Trachea midline, No JVD  RESP: intubated  Cardiovascular: RRR no MRG  PVS: No rubor, cyanosis, no edema  ABD:  soft, NT, Normoactive BS  Derm: Warm/Dry/Intact with no lesions  Neuro: A&O PPTS, cranial nerves II- XII grossly intact via interaction with patient. No focal deficits  PSYCH: Sedated with propofol    Recent labs results and imaging reviewed.       Recent Results (from the past 24 hour(s))   GLUCOSE, POC    Collection Time: 05/17/22  5:58 PM   Result Value Ref Range    Glucose (POC) 355 (H) 65 - 117 mg/dL    Performed by Batsheva Reyes    GLUCOSE, POC    Collection Time: 05/17/22 11:56 PM   Result Value Ref Range    Glucose (POC) 362 (H) 65 - 117 mg/dL    Performed by Catina Roberson    GLUCOSE, POC    Collection Time: 05/18/22  1:44 AM   Result Value Ref Range    Glucose (POC) 353 (H) 65 - 117 mg/dL    Performed by Grisel Love, POC    Collection Time: 05/18/22  6:01 AM   Result Value Ref Range    Glucose (POC) 323 (H) 65 - 117 mg/dL    Performed by Chadd Giraldo    MAGNESIUM    Collection Time: 05/18/22  7:14 AM   Result Value Ref Range    Magnesium 2.9 (H) 1.6 - 2.4 mg/dL   PHOSPHORUS    Collection Time: 05/18/22  7:14 AM   Result Value Ref Range    Phosphorus 3.9 2.6 - 4.7 mg/dL   CBC W/O DIFF    Collection Time: 05/18/22  7:14 AM   Result Value Ref Range    WBC 17.2 (H) 3.6 - 11.0 K/uL    RBC 2.82 (L) 3.80 - 5.20 M/uL    HGB 7.6 (L) 11.5 - 16.0 g/dL    HCT 24.4 (L) 35.0 - 47.0 %    MCV 86.5 80.0 - 99.0 FL    MCH 27.0 26.0 - 34.0 PG    MCHC 31.1 30.0 - 36.5 g/dL    RDW 17.6 (H) 11.5 - 14.5 %    PLATELET 669 (H) 242 - 400 K/uL    MPV 11.0 8.9 - 12.9 FL    NRBC 2.6 (H) 0.0  WBC    ABSOLUTE NRBC 0.44 (H) 0.00 - 1.09 K/uL   METABOLIC PANEL, COMPREHENSIVE    Collection Time: 05/18/22  7:14 AM   Result Value Ref Range    Sodium 144 136 - 145 mmol/L    Potassium 5.6 (H) 3.5 - 5.1 mmol/L    Chloride 113 (H) 97 - 108 mmol/L    CO2 22 21 - 32 mmol/L    Anion gap 9 5 - 15 mmol/L Glucose 289 (H) 65 - 100 mg/dL    BUN 66 (H) 6 - 20 mg/dL    Creatinine 1.55 (H) 0.55 - 1.02 mg/dL    BUN/Creatinine ratio 43 (H) 12 - 20      GFR est AA 40 (L) >60 ml/min/1.73m2    GFR est non-AA 33 (L) >60 ml/min/1.73m2    Calcium 8.3 (L) 8.5 - 10.1 mg/dL    Bilirubin, total 0.3 0.2 - 1.0 mg/dL    AST (SGOT) >2,000 (H) 15 - 37 U/L    ALT (SGPT) 1,492 (H) 12 - 78 U/L    Alk.  phosphatase 108 45 - 117 U/L    Protein, total 6.5 6.4 - 8.2 g/dL    Albumin 1.6 (L) 3.5 - 5.0 g/dL    Globulin 4.9 (H) 2.0 - 4.0 g/dL    A-G Ratio 0.3 (L) 1.1 - 2.2     C REACTIVE PROTEIN, QT    Collection Time: 05/18/22  7:14 AM   Result Value Ref Range    C-Reactive protein 18.90 (H) 0.00 - 0.60 mg/dL   PROCALCITONIN    Collection Time: 05/18/22  7:14 AM   Result Value Ref Range    Procalcitonin 1.66 (H) 0 ng/mL   GLUCOSE, POC    Collection Time: 05/18/22 10:22 AM   Result Value Ref Range    Glucose (POC) 241 (H) 65 - 117 mg/dL    Performed by Enrike Braun (KIKI)    PLASMA, ALLOCATE    Collection Time: 05/18/22 11:00 AM   Result Value Ref Range    Unit number [id number]     Blood component type FP 24H,Thaw     Unit division 00     Status of unit [address] to transfuse     Unit number [id number]     Blood component type FP 24h,Thaw1     Unit division 00     Status of unit [address] to transfuse    BLOOD GAS, ARTERIAL    Collection Time: 05/18/22 11:00 AM   Result Value Ref Range    pH 7.43 7.35 - 7.45      PCO2 35 35 - 45 mmHg    PO2 118 (H) 75 - 100 mmHg    O2 SAT 99 >95 %    BICARBONATE 24 22 - 26 mmol/L    BASE DEFICIT 0.6 0 - 2 mmol/L    FIO2 100 %   PROTHROMBIN TIME + INR    Collection Time: 05/18/22 11:15 AM   Result Value Ref Range    Prothrombin time 22.6 (H) 11.9 - 14.6 sec    INR 2.0 (H) 0.9 - 1.1     TROPONIN-HIGH SENSITIVITY    Collection Time: 05/18/22 11:15 AM   Result Value Ref Range    Troponin-High Sensitivity 890 (HH) 0 - 51 ng/L   NT-PRO BNP    Collection Time: 05/18/22 11:15 AM   Result Value Ref Range    NT pro-BNP 23,174 (H) <450 pg/mL   EKG, 12 LEAD, INITIAL    Collection Time: 05/18/22  1:22 PM   Result Value Ref Range    Ventricular Rate 63 BPM    Atrial Rate 63 BPM    P-R Interval 132 ms    QRS Duration 92 ms    Q-T Interval 444 ms    QTC Calculation (Bezet) 454 ms    Calculated P Axis 55 degrees    Calculated R Axis 40 degrees    Calculated T Axis 133 degrees    Diagnosis       Normal sinus rhythm  ST & T wave abnormality, consider anterolateral ischemia  Abnormal ECG  No previous ECGs available  Confirmed by Britni Rodgers ([id number]) on 5/18/2022 2:13:07 PM     GLUCOSE, POC    Collection Time: 05/18/22  1:51 PM   Result Value Ref Range    Glucose (POC) 208 (H) 65 - 117 mg/dL    Performed by KATHI MERCHANT    CBC WITH AUTOMATED DIFF    Collection Time: 05/18/22  4:00 PM   Result Value Ref Range    WBC 14.9 (H) 3.6 - 11.0 K/uL    RBC 2.87 (L) 3.80 - 5.20 M/uL    HGB 8.1 (L) 11.5 - 16.0 g/dL    HCT 25.3 (L) 35.0 - 47.0 %    MCV 88.2 80.0 - 99.0 FL    MCH 28.2 26.0 - 34.0 PG    MCHC 32.0 30.0 - 36.5 g/dL    RDW 16.6 (H) 11.5 - 14.5 %    PLATELET 079 557 - 196 K/uL    MPV 10.7 8.9 - 12.9 FL    NRBC 4.0 (H) 0.0  WBC    ABSOLUTE NRBC 0.59 (H) 0.00 - 0.01 K/uL    NEUTROPHILS PENDING %    LYMPHOCYTES PENDING %    MONOCYTES PENDING %    EOSINOPHILS PENDING %    BASOPHILS PENDING %    IMMATURE GRANULOCYTES PENDING %    ABS. NEUTROPHILS PENDING K/UL    ABS. LYMPHOCYTES PENDING K/UL    ABS. MONOCYTES PENDING K/UL    ABS. EOSINOPHILS PENDING K/UL    ABS. BASOPHILS PENDING K/UL    ABS. IMM. GRANS. PENDING K/UL    DF PENDING    PROTHROMBIN TIME + INR    Collection Time: 05/18/22  4:00 PM   Result Value Ref Range    Prothrombin time 21.1 (H) 11.9 - 14.6 sec    INR 1.8 (H) 0.9 - 1.1         XR Results (maximum last 3): Results from [place] encounter on 05/14/22    XR CHEST PORT    Impression  Intubation. Findings suggesting hydrostatic edema.       XR CHEST PORT    Impression  No evidence of an acute cardiopulmonary process. Results from Hospital Encounter encounter on 04/03/22    XR SWALLOW FUNC VIDEO    Impression  For all attempts, a delayed oral phase was noted. Nectar consistency: Pooling in the vallecula with no penetration or aspiration  Thin liquid consistency: Residue in the vallecula. No penetration or aspiration  Honey consistency: Residue in the vallecula. No penetration or aspiration  Puree consistency: No penetration or aspiration. 1 minute 55 seconds of fluoroscopy  DAP: 229.1  14 mGy  Single fluoroscopic image stored on PACS      CT Results (maximum last 3): Results from [place] encounter on 04/03/22    CT HEAD WO CONT    Impression  Chronic changes which appear stable. No acute or active intracranial process. Chronic paranasal sinus disease      Results from Hospital Encounter encounter on 02/21/22    CT HEAD WO CONT    Impression  1. Evolving left FREDO territory ischemic infarct. No acute hemorrhage. 2.  Paranasal sinus disease. CT ABD PELV WO CONT    Impression  Hemorrhage within the urinary bladder. Mild left  hydroureteronephrosis. MRI Results (maximum last 3): Results from [place] encounter on 02/21/22    MRI BRAIN WO CONT    Impression  Acute infarction left paracentral lobule and region of the white  matter of the left motor cortex. Nuclear Medicine Results (maximum last 3): No results found for this or any previous visit. US Results (maximum last 3): Results from [place] encounter on 02/21/22    US ABD COMP    Impression  Gallbladder sludge without other acute abnormality. VAS/US Results (maximum last 3):   Results from Abstract encounter on 01/13/17    LOWER EXT ART PVR MULT LEVEL SEG PRESSURES        Current Facility-Administered Medications:     0.9% sodium chloride infusion 250 mL, 250 mL, IntraVENous, PRN, Paulino, Jose Ledezma MD    insulin glargine (LANTUS) injection 10 Units, 10 Units, SubCUTAneous, QHS, Israel Soto MD    0.9% sodium chloride infusion 250 mL, 250 mL, IntraVENous, PRN, Graham Torres MD    0.9% sodium chloride infusion 250 mL, 250 mL, IntraVENous, PRN, Shannan Bob MD    NOREPINephrine (LEVOPHED) 32,000 mcg in dextrose 5% 250 mL (128 mcg/mL) infusion, 2 mcg/min, IntraVENous, TITRATE, Sharon Fields MD    0.9% sodium chloride infusion 250 mL, 250 mL, IntraVENous, PRN, Shannan Bob MD    propofol (DIPRIVAN) 10 mg/mL infusion, 0-50 mcg/kg/min, IntraVENous, TITRATE, Graham Torres MD, Last Rate: 9.5 mL/hr at 05/18/22 1246, 20 mcg/kg/min at 05/18/22 1246    [START ON 5/19/2022] sodium zirconium cyclosilicate (LOKELMA) powder packet 10 g, 10 g, Oral, DAILY, Lupe Louise MD    ampicillin-sulbactam (UNASYN) 3 g in 0.9% sodium chloride (MBP/ADV) 100 mL MBP, 3 g, IntraVENous, Q6H, Jatinder Clemons MD, Last Rate: 200 mL/hr at 05/18/22 0612, 3 g at 05/18/22 0612    0.9% sodium chloride infusion 250 mL, 250 mL, IntraVENous, PRN, Israel Soto MD    sodium hypochlorite (HALF STRENGTH DAKIN'S) 0.25% irrigation (bottle), , Topical, BID, Israel Soto MD, Given at 05/18/22 0857    glucose chewable tablet 16 g, 4 Tablet, Oral, PRN, Israel Soto MD    glucagon (GLUCAGEN) injection 1 mg, 1 mg, IntraMUSCular, PRN, Adria Soto MD    insulin lispro (HUMALOG) injection, , SubCUTAneous, Q6H, Israel Soto MD, 4 Units at 05/18/22 0612    vancomycin (VANCOCIN) 500 mg in 0.9% sodium chloride (MBP/ADV) 100 mL MBP, 500 mg, IntraVENous, Q12H, Jatinder Clemons MD, Last Rate: 100 mL/hr at 05/18/22 1631, 500 mg at 05/18/22 1631    Vancomycin Pharmacy Dosing, , Other, Rx Dosing/Monitoring, Kg Aguero, DO    sodium chloride (NS) flush 5-40 mL, 5-40 mL, IntraVENous, Q8H, Orestes Norman MD, 10 mL at 05/18/22 1400    acetaminophen (TYLENOL) tablet 650 mg, 650 mg, Oral, Q6H PRN, 650 mg at 05/18/22 0056 **OR** acetaminophen (TYLENOL) suppository 650 mg, 650 mg, Rectal, Q6H PRN, Nkechi JACKSON MD, 650 mg at 05/16/22 2223    polyethylene glycol (MIRALAX) packet 17 g, 17 g, Oral, DAILY PRN, Nkechi JACKSON MD    ondansetron (ZOFRAN ODT) tablet 4 mg, 4 mg, Oral, Q8H PRN **OR** ondansetron (ZOFRAN) injection 4 mg, 4 mg, IntraVENous, Q6H PRN, Orestes Norman MD    enoxaparin (LOVENOX) injection 40 mg, 40 mg, SubCUTAneous, DAILY, Orestes Norman MD, 40 mg at 05/17/22 1006    famotidine (PEPCID) tablet 20 mg, 20 mg, Oral, BID, Orestes Norman MD, 20 mg at 05/18/22 0855    acidophilus-pectin, citrus tablet 2 Tablet, 2 Tablet, Oral, DAILY, Orestes Norman MD, 2 Tablet at 05/18/22 0855    albuterol-ipratropium (DUO-NEB) 2.5 MG-0.5 MG/3 ML, 3 mL, Nebulization, Q6H PRN, Orestes Norman MD    amLODIPine (NORVASC) tablet 5 mg, 5 mg, Oral, DAILY, Orestes Norman MD, 5 mg at 05/18/22 0855    artificial saliva (MOUTH KOTE) 1 Spray, 1 Spray, Oral, TID, Nkechi JACKSON MD, 1 [place] at 05/18/22 0857    aspirin delayed-release tablet 81 mg, 81 mg, Oral, DAILY, Orestes Norman MD, 81 mg at 05/18/22 0854    atorvastatin (LIPITOR) tablet 20 mg, 20 mg, Oral, QHS, Orestes Norman MD, 20 mg at 05/17/22 2042    brimonidine (ALPHAGAN) 0.2 % ophthalmic solution 1 Drop, 1 Drop, Both Eyes, TID, Orestes Norman MD, 1 Drop at 05/18/22 0857    cholestyramine-aspartame (QUESTRAN LIGHT) packet 4 g, 4 g, Oral, BID WITH MEALS, Orestes Norman MD, 4 g at 05/18/22 0855    fenofibrate nanocrystallized (TRICOR) tablet 48 mg, 48 mg, Oral, DAILY, Orestes Norman MD, 48 mg at 05/18/22 0854    ferrous sulfate tablet 325 mg, 325 mg, Oral, BID, Orestes Norman MD, 325 mg at 05/18/22 0855    gabapentin (NEURONTIN) capsule 300 mg, 300 mg, Oral, BID, Orestes Norman MD, 300 mg at 05/18/22 0855    hydrALAZINE (APRESOLINE) tablet 50 mg, 50 mg, Oral, TID, Nkechi JACKSON MD, 50 mg at 05/18/22 0854    insulin glargine (LANTUS) injection 50 Units, 50 Units, SubCUTAneous, DAILY, Orestes Norman MD, 25 Units at 05/18/22 0907    latanoprost (XALATAN) 0.005 % ophthalmic solution 1 Drop, 1 Drop, Right Eye, QPM, Orestes Norman MD, 1 Drop at 05/17/22 1800    metoprolol tartrate (LOPRESSOR) tablet 50 mg, 50 mg, Oral, BID, Orestes Norman MD, 50 mg at 05/18/22 0855    traMADoL (ULTRAM) tablet 25 mg, 25 mg, Oral, Q12H PRN, Clarisa JACKSON MD, 25 mg at 05/18/22 2902    Case discussed with collaborating physician Dr. Lynn Richardson and our impression and recommendations are as follows:     1. Elevated troponin:   - patient is intubated and sedated, unable to fully assess  - HS troponin 890, elevation in the setting of volume overload and ? Sepsis. Continue to trend  - continue asa  - limited echo pending    2. Hypertension:   - blood pressure acceptable  - continue with current medications    3. Hyperlipidemia:   - hold statin due to elevated LFTs    2. Sepsis:   - WBC 17,000, source likely infected sacral wound  - surgery consulted       Thank you for involving us in the care of this patient. Please do not hesitate to call if additional questions arise. If after hours please call [phone]. Dr. Neymar Magallanes [facility]  [address], [address] [facility]  [phone]

## 2022-05-18 NOTE — PROGRESS NOTES
General Daily Progress Note          Patient Name:   Shorty Duong       YOB: 1945       Age:  78 y.o. 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  Cipro    Admitted again yesterday concerning for worsening of sacral ulcer    During last admission patient was seen by infectious disease and also seen by the vascular surgeon patient had debridement of wound during the last admission    Patient seen by the infectious disease yesterday    Sacral wound infection recent cultures growing Pseudomonas resistant to Zosyn and meropenem    5/17    Patient alert awake not in distress  Patient was seen by the vascular surgeon    Vascular surgery planned for laparoscopic loop colostomy placement and sacral debridement then wound vacuum  Also try to close the wound on the left foot with TMA    5/18    Resting in the bed not in any distress  Blood sugars running high    Seen by infectious disease continue vancomycin and start on Unasyn    Objective:     Visit Vitals  /65 (BP 1 Location: Right upper arm, BP Patient Position: At rest)   Pulse 81   Temp 99.1 °F (37.3 °C)   Resp 16   Ht 5' 7\" (1.702 m)   Wt 79.4 kg (175 lb)   SpO2 95%   Breastfeeding No   BMI 27.41 kg/m²        Recent Results (from the past 24 hour(s))   GLUCOSE, POC    Collection Time: 05/17/22 12:08 PM   Result Value Ref Range    Glucose (POC) 358 (H) 65 - 117 mg/dL    Performed by CECILIA Godfrey    Collection Time: 05/17/22 12:59 PM   Result Value Ref Range    Glucose (POC) 368 (H) 65 - 117 mg/dL    Performed by Myrle Lefort, POC Collection Time: 05/17/22  5:58 PM   Result Value Ref Range    Glucose (POC) 355 (H) 65 - 117 mg/dL    Performed by Ruben Frances, POC    Collection Time: 05/17/22 11:56 PM   Result Value Ref Range    Glucose (POC) 362 (H) 65 - 117 mg/dL    Performed by [facility], POC    Collection Time: 05/18/22  1:44 AM   Result Value Ref Range    Glucose (POC) 353 (H) 65 - 117 mg/dL    Performed by [facility], POC    Collection Time: 05/18/22  6:01 AM   Result Value Ref Range    Glucose (POC) 323 (H) 65 - 117 mg/dL    Performed by Yumiko Fowler      [unfilled]      Review of Systems    Not a good historian e. Physical Exam:      Constitutional: Alert awake not in distress  HENT:   Head: Normocephalic and atraumatic. Eyes: Pupils are equal, round, and reactive to light. EOM are normal.   Cardiovascular: Normal rate, regular rhythm and normal heart sounds. Pulmonary/Chest: Breath sounds normal. No wheezes. No rales. Exhibits no tenderness. Abdominal: Soft. Bowel sounds are normal. There is no abdominal tenderness. There is no rebound and no guarding. Musculoskeletal: Normal range of motion. Neurological: Alert awake bedbound    Skin sacral decubitus ulcer and left foot wound    XR CHEST PORT   Final Result   No evidence of an acute cardiopulmonary process.            Recent Results (from the past 24 hour(s))   GLUCOSE, POC    Collection Time: 05/17/22 12:08 PM   Result Value Ref Range    Glucose (POC) 358 (H) 65 - 117 mg/dL    Performed by Ruben Frances, POC    Collection Time: 05/17/22 12:59 PM   Result Value Ref Range    Glucose (POC) 368 (H) 65 - 117 mg/dL    Performed by Zuleyma Brewster    GLUCOSE, POC    Collection Time: 05/17/22  5:58 PM   Result Value Ref Range    Glucose (POC) 355 (H) 65 - 117 mg/dL    Performed by Ruben Frances, POC    Collection Time: 05/17/22 11:56 PM   Result Value Ref Range    Glucose (POC) 362 (H) 65 - 117 mg/dL    Performed by Tip Salinas    GLUCOSE, POC    Collection Time: 05/18/22  1:44 AM   Result Value Ref Range    Glucose (POC) 353 (H) 65 - 117 mg/dL    Performed by Jose F Goode, POC    Collection Time: 05/18/22  6:01 AM   Result Value Ref Range    Glucose (POC) 323 (H) 65 - 117 mg/dL    Performed by Ruth Reyes        Results     Procedure Component Value Units Date/Time    CULTURE, Kathie Alias STAIN [[id number]] Collected: 05/15/22 1915    Order Status: Completed Specimen: Wound from [facility] Toe Updated: 05/17/22 0732     Special Requests: No Special Requests        GRAM STAIN       3+ Gram Positive Rods (Coryneform)                  1+ apparent Gram Negative Rods           Culture result: Heavy Gram Negative Rods         Heavy Diphtheroids       CULTURE, Kathie Alias STAIN [[id number]]  (Susceptibility) Collected: 05/14/22 7602    Order Status: Completed Specimen: Wound from Ulcer Updated: 05/18/22 075     Special Requests: No Special Requests        GRAM STAIN Few WBCs seen         2+ Gram Negative Rods               Occasional Gram Positive Cocci in pairs            Rare Gram Positive Rods        Culture result: Moderate Acinetobacter baumannii complex                  Moderate Enterococcus faecium                  Light >2 organisms - contaminated specimen.  suggest recollection Anaerobic gram negative rods Beta Lactamase Positive          Susceptibility      Acinetobacter baumannii complex     GEMA     Ampicillin/sulbactam ($) Susceptible     Cefepime ($$) Susceptible     Ceftazidime ($) Susceptible     Ceftriaxone ($) Intermediate     Ciprofloxacin ($) Resistant     Gentamicin ($) Susceptible     Levofloxacin ($) Resistant     Meropenem ($$) Resistant     Piperacillin/Tazobac ($) Resistant     Tobramycin ($) Susceptible     Trimeth/Sulfa Susceptible                  Linear View               Susceptibility      Enterococcus faecium     GEMA     Ampicillin ($) Resistant     Daptomycin ($$$$$)  See below  [1] Linezolid ($$$$$) Susceptible     Vancomycin ($) Susceptible                 [1]  Dose Dependent  (SENSITIVITIES PERFORMED BY E-TEST)          Linear View                   CULTURE, BLOOD, PAIRED [[id number]]  (Abnormal) Collected: 05/14/22 2000    Order Status: Completed Specimen: Blood Updated: 05/17/22 1212     Special Requests: No Special Requests        Culture result:       Staphylococcus species, coagulase negative GROWING IN THE ANAEROBIC BOTTLE. No Site Indicated            (NOTE) PRELIMINARY REPORT OF GRAM POSITIVE COCCI IN CLUSTERS GROWING IN THE ANAEROBIC BOTTLE, CALLED TO AND READ BACK BY CANDI KNOWLES 5/16/22 1118 SCP. Labs:     Recent Labs     05/17/22 0604 05/16/22 0620   WBC 17.7* 16.6*   HGB 7.8* 6.7*   HCT 25.2* 21.7*   * 492*     Recent Labs     05/17/22  0604 05/16/22  0620 05/15/22  1211    141  --    K 5.3* 5.1  --    * 112*  --    CO2 22 24  --    BUN 48* 40*  --    CREA 1.21* 1.10*  --    * 299*  --    CA 8.7 8.3*  --    MG 2.6* 2.3  --    PHOS 3.9 3.8 5.0*     Recent Labs     05/17/22 0604 05/16/22 0620   ALT 25 26    106   TBILI 0.3 0.3   TP 6.5 6.4   ALB 1.6* 1.7*   GLOB 4.9* 4.7*     No results for input(s): INR, PTP, APTT, INREXT, INREXT in the last 72 hours. No results for input(s): FE, TIBC, PSAT, FERR in the last 72 hours. No results found for: FOL, RBCF   No results for input(s): PH, PCO2, PO2 in the last 72 hours. No results for input(s): CPK, CKNDX, TROIQ in the last 72 hours.     No lab exists for component: CPKMB  Lab Results   Component Value Date/Time    Cholesterol, total 124 03/08/2022 07:40 AM    HDL Cholesterol 30 03/08/2022 07:40 AM    LDL,Direct 79 06/28/2021 12:00 AM    LDL, calculated 44.8 03/08/2022 07:40 AM    Triglyceride 202 (H) 05/16/2022 06:20 AM    CHOL/HDL Ratio 4.1 03/08/2022 07:40 AM     Lab Results   Component Value Date/Time    Glucose (POC) 323 (H) 05/18/2022 06:01 AM    Glucose (POC) 353 (H) 05/18/2022 01:44 AM    Glucose (POC) 362 (H) 05/17/2022 11:56 PM    Glucose (POC) 355 (H) 05/17/2022 05:58 PM    Glucose (POC) 368 (H) 05/17/2022 12:59 PM     Lab Results   Component Value Date/Time    Color Yellow/Straw 05/14/2022 08:08 PM    Appearance Clear 05/14/2022 08:08 PM    Specific gravity 1.014 05/14/2022 08:08 PM    pH (UA) 6.0 05/14/2022 08:08 PM    Protein 30 (A) 05/14/2022 08:08 PM    Glucose 50 (A) 05/14/2022 08:08 PM    Ketone Negative 05/14/2022 08:08 PM    Bilirubin Negative 05/14/2022 08:08 PM    Urobilinogen 0.1 05/14/2022 08:08 PM    Nitrites Negative 05/14/2022 08:08 PM    Leukocyte Esterase Trace (A) 05/14/2022 08:08 PM    Bacteria Negative 05/14/2022 08:08 PM    Bacteria Rare (A) 05/14/2022 08:08 PM    WBC 0-4 05/14/2022 08:08 PM    WBC 4 05/14/2022 08:08 PM    RBC 0-5 05/14/2022 08:08 PM    RBC 1 05/14/2022 08:08 PM         Assessment:     Sacral decubitus ulcer with recent culture growing Pseudomonas rx with  Zosyn and meropenem  Sepsis with leukocytosis elevated procalcitonin and CRP  Left foot wound  Recent removal of left great toe and second toe  CVA with PEG tube placement  History of aspiration pneumonia  History of chronic kidney disease  Hypertension  Hyperlipidemia  Anemia of chronic disease  Hyperkalemia  Static post transfusion  Uncontrolled diabetes      Plan:     Continue amlodipine 5 mg daily  Aspirin 81 mg daily  Lipitor 20 mg daily  IV vancomycin and IV Unasyn  Cholestyramine 4 g twice a day  DVT prophylaxis with Lovenox 40 mg subcu daily  Fenofibrate 40 mg daily  Neurontin 200 mg twice a day  Hydralazine 50 mg 3 times a day  Lantus 50 units subcu daily    Add Lantus 10 units in the evening  Vancomycin with pharmacy protocol  BRAD BEDOYA [facility]      Vascular surgery planned for laparoscopic loop colostomy placement and sacral debridement then wound vacuum  Also try to close the wound on the left foot with TMA      Current Facility-Administered Medications:     0.9% sodium chloride infusion 250 mL, 250 mL, IntraVENous, PRN, Paulino, Viry Glover MD    ampicillin-sulbactam (UNASYN) 3 g in 0.9% sodium chloride (MBP/ADV) 100 mL MBP, 3 g, IntraVENous, Q6H, Foreign Mike MD, Last Rate: 200 mL/hr at 05/18/22 0612, 3 g at 05/18/22 0612    0.9% sodium chloride infusion 250 mL, 250 mL, IntraVENous, PRN, Mohiuddin, Laban Mcardle, MD    sodium hypochlorite (HALF STRENGTH DAKIN'S) 0.25% irrigation (bottle), , Topical, BID, Israel Soto MD, Given at 05/17/22 2042    glucose chewable tablet 16 g, 4 Tablet, Oral, PRN, Mohiuddin, Laban Mcardle, MD    glucagon (GLUCAGEN) injection 1 mg, 1 mg, IntraMUSCular, PRN, Jose G Lewis MD    insulin lispro (HUMALOG) injection, , SubCUTAneous, Q6H, Jose G Lewis MD, 4 Units at 05/18/22 0612    vancomycin (VANCOCIN) 500 mg in 0.9% sodium chloride (MBP/ADV) 100 mL MBP, 500 mg, IntraVENous, Q12H, Foreign Mike MD, Last Rate: 100 mL/hr at 05/18/22 0300, 500 mg at 05/18/22 [id number] [address] Draw trough at 1300 on 05/18/22, , Other, Bulmaro Rowland MD    Vancomycin Pharmacy Dosing, , Other, Rx Dosing/Monitoring, Artemio Pettit,     sodium chloride (NS) flush 5-40 mL, 5-40 mL, IntraVENous, Q8H, Orestes Norman MD, 10 mL at 05/18/22 0620    acetaminophen (TYLENOL) tablet 650 mg, 650 mg, Oral, Q6H PRN, 650 mg at 05/18/22 0056 **OR** acetaminophen (TYLENOL) suppository 650 mg, 650 mg, Rectal, Q6H PRN, Aidan JACKSON MD, 650 mg at 05/16/22 2223    polyethylene glycol (MIRALAX) packet 17 g, 17 g, Oral, DAILY PRN, Aidan JACKSON MD    ondansetron (ZOFRAN ODT) tablet 4 mg, 4 mg, Oral, Q8H PRN **OR** ondansetron (ZOFRAN) injection 4 mg, 4 mg, IntraVENous, Q6H PRN, Orestes Norman MD    enoxaparin (LOVENOX) injection 40 mg, 40 mg, SubCUTAneous, DAILY, Orestes Norman MD, 40 mg at 05/17/22 1006    famotidine (PEPCID) tablet 20 mg, 20 mg, Oral, BID, Orestes Norman MD, 20 mg at 05/17/22 2042    acidophilus-pectin, citrus tablet 2 Tablet, 2 Tablet, Oral, DAILY, Jose F Morrison MD, 2 Tablet at 05/17/22 1006    albuterol-ipratropium (DUO-NEB) 2.5 MG-0.5 MG/3 ML, 3 mL, Nebulization, Q6H PRN, Orestes Wesley MD    amLODIPine (NORVASC) tablet 5 mg, 5 mg, Oral, DAILY, Orestes Norman MD, 5 mg at 05/16/22 0943    artificial saliva (MOUTH KOTE) 1 Spray, 1 Spray, Oral, TID, Malu JACKSON MD, 1 Spray at 05/17/22 2042    aspirin delayed-release tablet 81 mg, 81 mg, Oral, DAILY, Orestes Norman MD, 81 mg at 05/17/22 1006    atorvastatin (LIPITOR) tablet 20 mg, 20 mg, Oral, QHS, Orestes Norman MD, 20 mg at 05/17/22 2042    brimonidine (ALPHAGAN) 0.2 % ophthalmic solution 1 Drop, 1 Drop, Both Eyes, TID, Jose F Morrison MD, 1 Drop at 05/17/22 2043    cholestyramine-aspartame (QUESTRAN LIGHT) packet 4 g, 4 g, Oral, BID WITH MEALS, Orestes Norman MD, 4 g at 05/17/22 1652    fenofibrate nanocrystallized (TRICOR) tablet 48 mg, 48 mg, Oral, DAILY, Orestes Norman MD, 48 mg at 05/17/22 1006    ferrous sulfate tablet 325 mg, 325 mg, Oral, BID, Orestes Norman MD, 325 mg at 05/17/22 2042    flucytosine (ANCOBON) capsule 500 mg, 500 mg, Oral, Q6H, Orestes Norman MD, 500 mg at 05/18/22 0600    gabapentin (NEURONTIN) capsule 300 mg, 300 mg, Oral, BID, Orestes Norman MD, 300 mg at 05/17/22 2042    hydrALAZINE (APRESOLINE) tablet 50 mg, 50 mg, Oral, TID, Orestes Norman MD, 50 mg at 05/16/22 2343    insulin glargine (LANTUS) injection 50 Units, 50 Units, SubCUTAneous, DAILY, Orestes Norman MD, 50 Units at 05/17/22 1033    latanoprost (XALATAN) 0.005 % ophthalmic solution 1 Drop, 1 Drop, Right Eye, QPM, Orestes Norman MD, 1 Drop at 05/17/22 1800    metoprolol tartrate (LOPRESSOR) tablet 50 mg, 50 mg, Oral, BID, Orestes Norman MD, 50 mg at 05/17/22 2042    traMADoL (ULTRAM) tablet 25 mg, 25 mg, Oral, Q12H PRN, Jose F Morrison MD, 25 mg at 05/18/22 4286

## 2022-05-18 NOTE — PROGRESS NOTES
Received call from lab with critical troponin of 890. Informed Dr. Breonna Vazquez (Anesthesiologist) verbally in [address]  and Dr. Beronica Venegas (Surgeon).

## 2022-05-19 NOTE — PROGRESS NOTES
Progress Note    Patient: Maddy Enriquez MRN: [id number]  SSN: [id number]    YOB: 1945  Age: 78 y.o. Sex: female      Admit Date: 5/14/2022    LOS: 5 days     Subjective:     Patient seen and examined. Patient was seen and examined for an elevated troponin. Patient has a past medical history of hypertension, hyperlipidemia, diabetes mellitus, peripheral artery disease, cerebral vascular accident (bedbound). Remains intubated and sedated in the ICU. Hemodynamically stable. Telemetry Review: No acute events noted in the past 24 hours. Sinus rhythm; heart rate 60s. Review of Symptoms:   Review of Systems   Unable to perform ROS: acuity of condition         Objective:     Vitals:    05/19/22 0644 05/19/22 0700 05/19/22 0800 05/19/22 0930   BP:  (!) 119/58     Pulse:  65 66    Resp:  12     Temp:  97.5 °F (36.4 °C)     SpO2:  100%  100%   Weight: 85.6 kg (188 lb 11.4 oz)      Height:            Intake and Output:  Current Shift: No intake/output data recorded. Last three shifts: 05/17 1901 - 05/19 0700  In: 3522.4 [I.V.:1692.1]  Out: 1440 [Urine:1440]    Physical Exam:   Crystal Abbott Physical Exam  Vitals and nursing note reviewed. Constitutional:       Appearance: She is ill-appearing. Comments: Sedated   Cardiovascular:      Rate and Rhythm: Normal rate and regular rhythm. Pulses: Normal pulses. Heart sounds: Normal heart sounds. Pulmonary:      Effort: Pulmonary effort is normal. No respiratory distress. Breath sounds: Normal breath sounds. No stridor. No wheezing, rhonchi or rales. Abdominal:      General: Abdomen is flat. Bowel sounds are normal. There is no distension. Palpations: Abdomen is soft. Tenderness: There is no abdominal tenderness. There is no rebound. Skin:     General: Skin is warm and dry. Neurological:      Comments: Sedated   Psychiatric:         Mood and Affect: Mood normal.         Behavior: Behavior normal.           Lab/Data Review:   All lab results for the last 24 hours reviewed.          Current Facility-Administered Medications:     dextrose 10% infusion 0-250 mL, 0-250 mL, IntraVENous, PRN, Israel Stoo MD, 125 mL at 05/19/22 0537    cefTAZidime (FORTAZ) 1 g in 0.9% sodium chloride (MBP/ADV) 50 mL MBP, 1 g, IntraVENous, Q12H, Raissa Dutta MD    0.9% sodium chloride infusion 250 mL, 250 mL, IntraVENous, PRN, Mouna Bob MD    insulin glargine (LANTUS) injection 10 Units, 10 Units, SubCUTAneous, QHS, Israel Soto MD    0.9% sodium chloride infusion 250 mL, 250 mL, IntraVENous, PRN, Dariana Jimenez MD    0.9% sodium chloride infusion 250 mL, 250 mL, IntraVENous, PRN, Mouna Bob MD    NOREPINephrine (LEVOPHED) 32,000 mcg in dextrose 5% 250 mL (128 mcg/mL) infusion, 2 mcg/min, IntraVENous, TITRATE, Regina Fields MD    0.9% sodium chloride infusion 250 mL, 250 mL, IntraVENous, PRN, Mouna Bob MD    propofol (DIPRIVAN) 10 mg/mL infusion, 0-50 mcg/kg/min, IntraVENous, TITRATE, Regina Fields MD, Last Rate: 9.5 mL/hr at 05/19/22 0643, 20 mcg/kg/min at 05/19/22 0643    sodium zirconium cyclosilicate (LOKELMA) powder packet 10 g, 10 g, Oral, DAILY, Yvonne Louise MD    Vancomycin - reminder to draw level 5/19 @ 1800 please, , Other, ONCE, Raissa Dutta MD    0.9% sodium chloride infusion 250 mL, 250 mL, IntraVENous, PRN, Nancy Soto MD    sodium hypochlorite (HALF STRENGTH DAKIN'S) 0.25% irrigation (bottle), , Topical, BID, Israel Soto MD, Given at 05/18/22 0857    glucose chewable tablet 16 g, 4 Tablet, Oral, PRN, Israel Soto MD    glucagon (GLUCAGEN) injection 1 mg, 1 mg, IntraMUSCular, PRN, Israel Soto MD    insulin lispro (HUMALOG) injection, , SubCUTAneous, Q6H, Israel Soto MD, 4 Units at 05/18/22 1394    Vancomycin Pharmacy Dosing, , Other, Rx Dosing/Monitoring, Liang VILLASEÑOR,     sodium chloride (NS) flush 5-40 mL, 5-40 mL, IntraVENous, Q8H, Agaedith, Paige Wu MD, 10 mL at 05/19/22 0551    acetaminophen (TYLENOL) tablet 650 mg, 650 mg, Oral, Q6H PRN, 650 mg at 05/18/22 0056 **OR** acetaminophen (TYLENOL) suppository 650 mg, 650 mg, Rectal, Q6H PRN, Clarisa JACKSON MD, 650 mg at 05/16/22 2223    polyethylene glycol (MIRALAX) packet 17 g, 17 g, Oral, DAILY PRN, Clarisa JACKSON MD    ondansetron (ZOFRAN ODT) tablet 4 mg, 4 mg, Oral, Q8H PRN **OR** ondansetron (ZOFRAN) injection 4 mg, 4 mg, IntraVENous, Q6H PRN, Yohan Merino MD    [Held by provider] enoxaparin (LOVENOX) injection 40 mg, 40 mg, SubCUTAneous, DAILY, Orestes Norman MD, 40 mg at 05/17/22 1006    famotidine (PEPCID) tablet 20 mg, 20 mg, Oral, BID, Yohan Merino MD, 20 mg at 05/19/22 0929    acidophilus-pectin, citrus tablet 2 Tablet, 2 Tablet, Oral, DAILY, Orestse Norman MD, 2 Tablet at 05/19/22 0929    albuterol-ipratropium (DUO-NEB) 2.5 MG-0.5 MG/3 ML, 3 mL, Nebulization, Q6H PRN, Orestes Norman MD    amLODIPine (NORVASC) tablet 5 mg, 5 mg, Oral, DAILY, Orestes Norman MD, 5 mg at 05/19/22 0929    artificial saliva (MOUTH KOTE) 1 Spray, 1 Spray, Oral, TID, Clarisa JACKSON MD, [address] [place] at 05/18/22 0857    aspirin delayed-release tablet 81 mg, 81 mg, Oral, DAILY, Orestes Norman MD, 81 mg at 05/18/22 0854    brimonidine (ALPHAGAN) 0.2 % ophthalmic solution 1 Drop, 1 Drop, Both Eyes, TID, Orestes Norman MD, 1 Drop at 05/19/22 0930    cholestyramine-aspartame (QUESTRAN LIGHT) packet 4 g, 4 g, Oral, BID WITH MEALS, Orestes Norman MD, 4 g at 05/19/22 0929    [Held by provider] fenofibrate nanocrystallized (TRICOR) tablet 48 mg, 48 mg, Oral, DAILY, Orestes Norman MD, 48 mg at 05/19/22 9132    ferrous sulfate tablet 325 mg, 325 mg, Oral, BID, Orestes Norman MD, 325 mg at 05/19/22 7943    gabapentin (NEURONTIN) capsule 300 mg, 300 mg, Oral, BID, Orestes Norman MD, 300 mg at 05/19/22 0929    hydrALAZINE (APRESOLINE) tablet 50 mg, 50 mg, Oral, TID, Emerson, Nura Herman MD, 50 mg at 05/18/22 0854    insulin glargine (LANTUS) injection 50 Units, 50 Units, SubCUTAneous, DAILY, Orestes Norman MD, 25 Units at 05/18/22 0907    latanoprost (XALATAN) 0.005 % ophthalmic solution 1 Drop, 1 Drop, Right Eye, QPM, Orestes Norman MD, 1 Drop at 05/17/22 1800    metoprolol tartrate (LOPRESSOR) tablet 50 mg, 50 mg, Oral, BID, Orestes Norman MD, 50 mg at 05/18/22 0855    traMADoL (ULTRAM) tablet 25 mg, 25 mg, Oral, Q12H PRN, Sanjana JACKSON MD, 25 mg at 05/18/22 0056      Assessment:     Active Problems:    Sacral ulcer ([address] (5/14/2022)        Plan:     Case discussed with Collaborating physician Dr. Theodore Ann and our recommendations are as follows:     1. Elevated troponin:   - patient is intubated and sedated, unable to fully assess  - HS troponin 890 repeat 800 , elevation in the setting of volume overload and ? Sepsis. - continue asa  - Echo on 4/10/22 showed an EF of 50-55%, with no wall motion, moderate TR, mild MR.   - Repeat limited 5/18/22 with EF 20-25%, severe global hypokinesis, Moderate TR/MD. - will continue with conservative management, pending clinical improvement     2. Hypertension:   - blood pressure acceptable  - continue with current medications     3. Hyperlipidemia:   - hold statin due to elevated LFTs     2. Sepsis:   - WBC 17,000, source likely infected sacral wound  - surgery consulted and deferred surgery at this time. Thank you for involving us in the care of this patient. Please do not hesitate to call if additional questions arise. If after hours please call [phone].     Signed By: Leonie Bocanegra NP     May 19, 2022

## 2022-05-19 NOTE — PROGRESS NOTES
PT eval order received and acknowledged, however, patient transferred to ICU following surgery and is currently intubated and sedated. PT eval orders will be discontinued and will need new PT eval order once pt medically stable for evaluation. Thank you.

## 2022-05-19 NOTE — PROGRESS NOTES
Vancomycin Dosing Consult  Day #3 of vancomycin therapy  Consult ordered by Dr. Marlena Wells for this 78 y.o. female for indication of Decubitus ulcer infection. Antibiotic regimen: Vancomycin     Temp (24hrs), Av.3 °F (36.8 °C), Min:97 °F (36.1 °C), Max:99.1 °F (37.3 °C)    Recent Labs     22  1600 22  0714 22  0604   WBC 13.9* 17.2* 17.7*     Recent Labs     22  1600 22  0714 22  0604   CREA 1.76* 1.55* 1.21*   BUN 68* 66* 48*     Recent Labs     22  0714 22  0620   CRP 18.90* 18.90*     Recent Labs     22  0714 22  0620   PCT 1.66* 0.59*     Est CrCl: 30 ml/min  Concomitant nephrotoxic drugs: None    Cultures:    Wound: Moderate MDR Pseudomonas aeruginosa susceptible to Zerbaxa, aminoglycosides), moderate mixed skin yasmine, moderate mixed enteric yasmine including yeast, final   Blood: Gram positive cocci   Wound: Occasional Gram Positive Cocci,  Enterococcus faecium, Acinetobacter baumannii complex    Target range: Trough 10-15 mcg/mL    Last Level:   = 28.2mcg/mL     Recent level history (3):  Date/Time Dose & Interval Measured Level (mcg/mL) Associated AUC/GEMA     @ 1300  750mg q 12 h  24.2        500mg q 12 h       @ 1600  500mg q 12 h  28.2        Assessment/Plan:          CKD - stage 3a; targeting trough of 10-15  WBC, procal and CRP elevated.   Creatinine is trending up  Level of 28.2 is supratherapeutic  Last dose given  @ 1631 before regimen could be held  Hold Vancomycin until level is drawn  @ 1800  Antimicrobial stop date TBD

## 2022-05-19 NOTE — CONSULTS
Consult    Patient: Allyson Curtis MRN: [id number]  SSN: [id number]    YOB: 1945  Age: 78 y.o. Sex: female      Subjective:      Allyson Curtis is a 78 y.o. female who is being seen for abnormal liver function tests  History from patient records, patient was intubated, no family member in the room    The emergency room 3 days ago with increase of leg pain, patient had decubitus ulcers, UTI history, patient diagnosed with sepsis, has been treated with antibiotic,Today patient was scheduled for a laparoscopic loop colostomy, sacral wound debridement, and an amputation of the left transmetatarsal. patient was noted to be hypotensive and hypoxic, patient was given levophed, intubated, sedated, and transferred to the ICU for further evaluation and management.  Labs showed HS troponin 890, pro-BNP 23,174, transaminase elevated, GI consultation placed,  Past Medical History:   Diagnosis Date    Anemia     Chronic kidney disease     Diabetes mellitus ([facility] [address]     Hemiplegia affecting dominant side, post-stroke ([facility] [address] 05/04/2022    HTN (hypertension)     Hyperkalemia     Hyperlipidemia     Ill-defined condition     Neuropathy     PAD (peripheral artery disease) (HCC)     PCI    Sciatica     Stroke (HCC)     UTI (urinary tract infection)      Past Surgical History:   Procedure Laterality Date    HX AMPUTATION Right     great toe    HX CERVICAL FUSION      HX GI      HX OTHER SURGICAL Bilateral     Stent placement    HX OTHER SURGICAL      HX VASCULAR STENT Bilateral     2 in right 1 in left    HX VASCULAR STENT Bilateral       Family History   Problem Relation Age of Onset    Cancer Mother     Diabetes Mother     Hypertension Mother      Social History     Tobacco Use    Smoking status: Never Smoker    Smokeless tobacco: Never Used   Substance Use Topics    Alcohol use: Not Currently      Current Facility-Administered Medications   Medication Dose Route Frequency Provider Last Rate Last Admin    dextrose 10% infusion 0-250 mL  0-250 mL IntraVENous PRN Israel Soto MD   125 mL at 05/19/22 0537    cefTAZidime (FORTAZ) 1 g in 0.9% sodium chloride (MBP/ADV) 50 mL MBP  1 g IntraVENous Q12H Kameron Varma MD   1 g at 05/19/22 1100    0.9% sodium chloride infusion 250 mL  250 mL IntraVENous PRN Colette Bob MD        insulin glargine (LANTUS) injection 10 Units  10 Units SubCUTAneous QHS Israel Soto MD        0.9% sodium chloride infusion 250 mL  250 mL IntraVENous PRN Abraham Fields MD        0.9% sodium chloride infusion 250 mL  250 mL IntraVENous PRN Colette Bob MD        NOREPINephrine (LEVOPHED) 32,000 mcg in dextrose 5% 250 mL (128 mcg/mL) infusion  2 mcg/min IntraVENous TITRATE Abraham Fields MD        0.9% sodium chloride infusion 250 mL  250 mL IntraVENous PRN Colette Bob MD        propofol (DIPRIVAN) 10 mg/mL infusion  0-50 mcg/kg/min IntraVENous TITRATE Abraham Fields MD 9.5 mL/hr at 05/19/22 0643 20 mcg/kg/min at 05/19/22 2451    sodium zirconium cyclosilicate (LOKELMA) powder packet 10 g  10 g Oral DAILY Raquel Louise MD        Vancomycin - reminder to draw level 5/19 @ 1800 please   Other Rodger Salomon MD        0.9% sodium chloride infusion 250 mL  250 mL IntraVENous PRN Sesar Oviedo MD        sodium hypochlorite (HALF STRENGTH DAKIN'S) 0.25% irrigation (bottle)   Topical BID Sesar Oviedo MD   Given at 05/18/22 0857    glucose chewable tablet 16 g  4 Tablet Oral PRN Lynne Soto MD        glucagon (GLUCAGEN) injection 1 mg  1 mg IntraMUSCular PRN Lynne Soto MD Wilhelminia Blazing insulin lispro (HUMALOG) injection   SubCUTAneous Q6H Lynne Soto MD   4 Units at 05/18/22 3784    Vancomycin Pharmacy Dosing   Other Rx Dosing/Monitoring [place] DO        sodium chloride (NS) flush 5-40 mL  5-40 mL IntraVENous Q8H Fiorella JACKSON MD   10 mL at 05/19/22 0551    acetaminophen (TYLENOL) tablet 650 mg  650 mg Oral Q6H PRN Amanda JACKSON MD   650 mg at 05/18/22 0056    Or    acetaminophen (TYLENOL) suppository 650 mg  650 mg Rectal Q6H PRN Amanda JACKSON MD   650 mg at 05/16/22 2223    polyethylene glycol (MIRALAX) packet 17 g  17 g Oral DAILY PRN Amanda JACKSON MD        ondansetron (ZOFRAN ODT) tablet 4 mg  4 mg Oral Q8H PRN Amanda JACKSON MD        Or    ondansetron (ZOFRAN) injection 4 mg  4 mg IntraVENous Q6H PRN Hardy Melton MD        [Held by provider] enoxaparin (LOVENOX) injection 40 mg  40 mg SubCUTAneous DAILY Hardy Melton MD   40 mg at 05/17/22 1006    famotidine (PEPCID) tablet 20 mg  20 mg Oral BID Amanda JACKSON MD   20 mg at 05/19/22 1890    acidophilus-pectin, citrus tablet 2 Tablet  2 Tablet Oral DAILY Hardy Melton MD   2 Tablet at 05/19/22 0929    albuterol-ipratropium (DUO-NEB) 2.5 MG-0.5 MG/3 ML  3 mL Nebulization Q6H PRN Amanda JACKSON MD        amLODIPine (NORVASC) tablet 5 mg  5 mg Oral DAILY Amanda JACKSON MD   5 mg at 05/19/22 3048    artificial saliva (MOUTH KOTE) 1 Spray  1 Spray Oral TID Hardy Melton MD   1 [place] at 05/18/22 0857    aspirin delayed-release tablet 81 mg  81 mg Oral DAILY Amanda JACKSON MD   81 mg at 05/18/22 0854    brimonidine (ALPHAGAN) 0.2 % ophthalmic solution 1 Drop  1 Drop Both Eyes TID Hardy Melton MD   1 Drop at 05/19/22 0930    cholestyramine-aspartame (QUESTRAN LIGHT) packet 4 g  4 g Oral BID WITH MEALS Amanda JACKSON MD   4 g at 05/19/22 0929    [Held by provider] fenofibrate nanocrystallized (TRICOR) tablet 48 mg  48 mg Oral DAILY Amanda JACKSON MD   48 mg at 05/19/22 3683    ferrous sulfate tablet 325 mg  325 mg Oral BID Amanda JACKSON MD   325 mg at 05/19/22 0929    gabapentin (NEURONTIN) capsule 300 mg  300 mg Oral BID Amanad JACKSON MD   300 mg at 05/19/22 0929    hydrALAZINE (APRESOLINE) tablet 50 mg  50 mg Oral TID Hardy Melton MD   50 mg at 05/18/22 0823    insulin glargine (LANTUS) injection 50 Units  50 Units SubCUTAneous DAILY Terry Patterson MD   25 Units at 05/18/22 0907    latanoprost (XALATAN) 0.005 % ophthalmic solution 1 Drop  1 Drop Right Eye QPM Terry Patterson MD   1 Drop at 05/17/22 1800    metoprolol tartrate (LOPRESSOR) tablet 50 mg  50 mg Oral BID Nancy JACKSON MD   50 mg at 05/18/22 0855    traMADoL (ULTRAM) tablet 25 mg  25 mg Oral Q12H PRN Terry Patterson MD   25 mg at 05/18/22 0056        No Known Allergies    Review of Systems:  Review of Systems   Unable to perform ROS: Intubated        Objective:     Vitals:    05/19/22 1000 05/19/22 1100 05/19/22 1141 05/19/22 1200   BP: (!) 116/59 115/60  (!) 116/58   Pulse: 62 62  62   Resp: 12 12  12   Temp:  97.4 °F (36.3 °C)     SpO2: 99% 98%  99%   Weight:       Height:   5' 7.01\" (1.702 m)         Physical Exam:  Physical Exam  Constitutional:       Appearance: She is ill-appearing. HENT:      Head: Atraumatic. Mouth/Throat:      Mouth: Mucous membranes are dry. Eyes:      General: No scleral icterus. Cardiovascular:      Rate and Rhythm: Tachycardia present. Pulmonary:      Effort: Respiratory distress present. Abdominal:      General: Bowel sounds are normal.   Musculoskeletal:         General: Deformity present. Cervical back: No tenderness. Skin:     General: Skin is dry. Neurological:      Mental Status: Mental status is at baseline.           Recent Results (from the past 24 hour(s))   EKG, 12 LEAD, INITIAL    Collection Time: 05/18/22  1:22 PM   Result Value Ref Range    Ventricular Rate 63 BPM    Atrial Rate 63 BPM    P-R Interval 132 ms    QRS Duration 92 ms    Q-T Interval 444 ms    QTC Calculation (Bezet) 454 ms    Calculated P Axis 55 degrees    Calculated R Axis 40 degrees    Calculated T Axis 133 degrees    Diagnosis       Normal sinus rhythm  ST & T wave abnormality, consider anterolateral ischemia  Abnormal ECG  No previous ECGs available  Confirmed by Rodney Rodarte ([id number]) on 5/18/2022 2:13:07 PM     GLUCOSE, POC    Collection Time: 05/18/22  1:51 PM   Result Value Ref Range    Glucose (POC) 208 (H) 65 - 117 mg/dL    Performed by Malou Blackmon, TROUGH    Collection Time: 05/18/22  4:00 PM   Result Value Ref Range    Vancomycin,trough 28.2 (HH) 5.0 - 10.0 ug/mL   CBC WITH AUTOMATED DIFF    Collection Time: 05/18/22  4:00 PM   Result Value Ref Range    WBC 13.9 (H) 3.6 - 11.0 K/uL    RBC 2.87 (L) 3.80 - 5.20 M/uL    HGB 8.1 (L) 11.5 - 16.0 g/dL    HCT 25.3 (L) 35.0 - 47.0 %    MCV 88.2 80.0 - 99.0 FL    MCH 28.2 26.0 - 34.0 PG    MCHC 32.0 30.0 - 36.5 g/dL    RDW 16.6 (H) 11.5 - 14.5 %    PLATELET 179 723 - 578 K/uL    MPV 10.7 8.9 - 12.9 FL    NRBC 4.0 (H) 0.0  WBC    ABSOLUTE NRBC 0.59 (H) 0.00 - 0.01 K/uL    NEUTROPHILS 79 (H) 32 - 75 %    LYMPHOCYTES 19 12 - 49 %    MONOCYTES 2 (L) 5 - 13 %    EOSINOPHILS 0 0 - 7 %    BASOPHILS 0 0 - 1 %    NRBC 7.0  WBC    IMMATURE GRANULOCYTES 0 %    ABS. NEUTROPHILS 11.0 (H) 1.8 - 8.0 K/UL    ABS. LYMPHOCYTES 2.6 0.8 - 3.5 K/UL    ABS. MONOCYTES 0.3 0.0 - 1.0 K/UL    ABS. EOSINOPHILS 0.0 0.0 - 0.4 K/UL    ABS. BASOPHILS 0.0 0.0 - 0.1 K/UL    ABSOLUTE NRBC 0.97 K/uL    ABS. IMM. GRANS. 0.0 K/UL    DF Manual      RBC COMMENTS Hypochromia  1+       METABOLIC PANEL, COMPREHENSIVE    Collection Time: 05/18/22  4:00 PM   Result Value Ref Range    Sodium 144 136 - 145 mmol/L    Potassium 5.0 3.5 - 5.1 mmol/L    Chloride 114 (H) 97 - 108 mmol/L    CO2 23 21 - 32 mmol/L    Anion gap 7 5 - 15 mmol/L    Glucose 174 (H) 65 - 100 mg/dL    BUN 68 (H) 6 - 20 mg/dL    Creatinine 1.76 (H) 0.55 - 1.02 mg/dL    BUN/Creatinine ratio 39 (H) 12 - 20      GFR est AA 34 (L) >60 ml/min/1.73m2    GFR est non-AA 28 (L) >60 ml/min/1.73m2    Calcium 8.9 8.5 - 10.1 mg/dL    Bilirubin, total 0.6 0.2 - 1.0 mg/dL    AST (SGOT) >2,000 (H) 15 - 37 U/L    ALT (SGPT) 1,493 (H) 12 - 78 U/L    Alk.  phosphatase 91 45 - 117 U/L    Protein, total 6.0 (L) 6.4 - 8.2 g/dL    Albumin 1.7 (L) 3.5 - 5.0 g/dL    Globulin 4.3 (H) 2.0 - 4.0 g/dL    A-G Ratio 0.4 (L) 1.1 - 2.2     PROTHROMBIN TIME + INR    Collection Time: 05/18/22  4:00 PM   Result Value Ref Range    Prothrombin time 21.1 (H) 11.9 - 14.6 sec    INR 1.8 (H) 0.9 - 1.1     ECHO ADULT COMPLETE    Collection Time: 05/18/22  5:12 PM   Result Value Ref Range    LV EDV A4C 66 mL    LV ESV A4C 61 mL    IVSd 1.3 (A) 0.6 - 0.9 cm    LVIDd 4.6 3.9 - 5.3 cm    LVIDs 4.0 cm    LVOT Mean Gradient 1 mmHg    LVOT VTI 10.6 cm    LVOT Peak Velocity 0.5 m/s    LVOT Peak Gradient 1 mmHg    LVPWd 1.3 (A) 0.6 - 0.9 cm    LV E' Lateral Velocity 5 cm/s    LV E' Septal Velocity 8 cm/s    LV Ejection Fraction A4C 8 %    LA Major Gabriels 5.0 cm    LA Area 4C 14.3 cm2    LA Diameter 3.5 cm    AV Mean Gradient 4 mmHg    AV VTI 24.5 cm    AV Mean Velocity 0.9 m/s    AV Peak Velocity 1.3 m/s    AV Peak Gradient 6 mmHg    MR .0 cm    MV Mean Gradient 2 mmHg    MV VTI 53.3 cm    MV Mean Velocity 0.6 m/s    MR Peak Velocity 4.0 m/s    MR Peak Gradient 64 mmHg    MV Max Velocity 1.4 m/s    MV Peak Gradient 8 mmHg    MV A Velocity 0.94 m/s    MV E Velocity 1.37 m/s    AK Max Velocity 1.0 m/s    Pulmonary Artery EDP 4 mmHg    PV Mean Gradient 1 mmHg    PV VTI 14.3 cm    PV Mean Velocity 0.5 m/s    PV Max Velocity 0.8 m/s    PV Peak Gradient 3 mmHg    TR Max Velocity 2.82 m/s    TR Peak Gradient 32 mmHg    TAPSE 1.4 (A) 1.7 cm    Fractional Shortening 2D 13 28 - 44 %    LV ESV Index A4C 32 mL/m2    LV EDV Index A4C 35 mL/m2    LVIDd Index 2.41 cm/m2    LVIDs Index 2.09 cm/m2    LV RWT Ratio 0.57     LV Mass 2D 230.2 (A) 67 - 162 g    LV Mass 2D Index 120.5 (A) 43 - 95 g/m2    MV E/A 1.46     E/E' Ratio (Averaged) 22.26     E/E' Lateral 27.40     E/E' Septal 17.13     LA Size Index 1.83 cm/m2    AV Velocity Ratio 0.38     LVOT:AV VTI Index 0.43     MV:LVOT VTI Index 5.03     RV Basal Dimension 2.6 cm    RV Mid Dimension 2.2 cm RV Free Wall Peak S' 8 cm/s    Est. RA Pressure 3 mmHg    RVSP 35 mmHg   BLOOD GAS, ARTERIAL    Collection Time: 05/18/22  5:30 PM   Result Value Ref Range    pH 7.52 (H) 7.35 - 7.45      PCO2 25 (L) 35 - 45 mmHg    PO2 154 (H) 75 - 100 mmHg    O2  >95 %    BICARBONATE 23 22 - 26 mmol/L    BASE DEFICIT 1.5 0 - 2 mmol/L    O2 METHOD VENT      FIO2 60.0 %    MODE Assist Control/Volume Control      Tidal volume 550      SET RATE 12      EPAP/CPAP/PEEP 5.0      SITE Right Radial      EVELIN'S TEST PASS     GLUCOSE, POC    Collection Time: 05/18/22  5:37 PM   Result Value Ref Range    Glucose (POC) 146 (H) 65 - 117 mg/dL    Performed by Vince Velásquez    GLUCOSE, POC    Collection Time: 05/18/22 11:50 PM   Result Value Ref Range    Glucose (POC) 94 65 - 117 mg/dL    Performed by [facility]    GLUCOSE, POC    Collection Time: 05/19/22 12:04 AM   Result Value Ref Range    Glucose (POC) 99 65 - 117 mg/dL    Performed by [facility]    METABOLIC PANEL, COMPREHENSIVE    Collection Time: 05/19/22  4:00 AM   Result Value Ref Range    Sodium 147 (H) 136 - 145 mmol/L    Potassium 4.0 3.5 - 5.1 mmol/L    Chloride 118 (H) 97 - 108 mmol/L    CO2 21 21 - 32 mmol/L    Anion gap 8 5 - 15 mmol/L    Glucose 68 65 - 100 mg/dL    BUN 65 (H) 6 - 20 mg/dL    Creatinine 1.67 (H) 0.55 - 1.02 mg/dL    BUN/Creatinine ratio 39 (H) 12 - 20      GFR est AA 36 (L) >60 ml/min/1.73m2    GFR est non-AA 30 (L) >60 ml/min/1.73m2    Calcium 9.2 8.5 - 10.1 mg/dL    Bilirubin, total 0.5 0.2 - 1.0 mg/dL    AST (SGOT) 1,458 (H) 15 - 37 U/L    ALT (SGPT) 1,467 (H) 12 - 78 U/L    Alk.  phosphatase 89 45 - 117 U/L    Protein, total 6.6 6.4 - 8.2 g/dL    Albumin 1.6 (L) 3.5 - 5.0 g/dL    Globulin 5.0 (H) 2.0 - 4.0 g/dL    A-G Ratio 0.3 (L) 1.1 - 2.2     BLOOD GAS, ARTERIAL    Collection Time: 05/19/22  4:00 AM   Result Value Ref Range    pH 7.51 (H) 7.35 - 7.45      PCO2 26 (L) 35 - 45 mmHg    PO2 68 (L) 75 - 100 mmHg    O2 SAT 96 >95 % BICARBONATE 23 22 - 26 mmol/L    BASE DEFICIT 1.4 0 - 2 mmol/L    O2 METHOD VENT      FIO2 30.0 %    MODE Assist Control/Volume Control      Tidal volume 550      SET RATE 12      IPAP/PIP 0      EPAP/CPAP/PEEP 5.0      SITE Arterial Line      EVELIN'S TEST PASS     CBC WITH AUTOMATED DIFF    Collection Time: 05/19/22  4:15 AM   Result Value Ref Range    WBC 13.1 (H) 3.6 - 11.0 K/uL    RBC 2.76 (L) 3.80 - 5.20 M/uL    HGB 7.8 (L) 11.5 - 16.0 g/dL    HCT 24.0 (L) 35.0 - 47.0 %    MCV 87.0 80.0 - 99.0 FL    MCH 28.3 26.0 - 34.0 PG    MCHC 32.5 30.0 - 36.5 g/dL    RDW 16.9 (H) 11.5 - 14.5 %    PLATELET 820 986 - 956 K/uL    MPV 10.8 8.9 - 12.9 FL    NRBC 5.3 (H) 0.0  WBC    ABSOLUTE NRBC 0.69 (H) 0.00 - 0.01 K/uL    NEUTROPHILS 75 32 - 75 %    LYMPHOCYTES 15 12 - 49 %    MONOCYTES 5 5 - 13 %    EOSINOPHILS 2 0 - 7 %    BASOPHILS 1 0 - 1 %    IMMATURE GRANULOCYTES 2 (H) 0 - 0.5 %    ABS. NEUTROPHILS 9.9 (H) 1.8 - 8.0 K/UL    ABS. LYMPHOCYTES 1.9 0.8 - 3.5 K/UL    ABS. MONOCYTES 0.7 0.0 - 1.0 K/UL    ABS. EOSINOPHILS 0.3 0.0 - 0.4 K/UL    ABS. BASOPHILS 0.1 0.0 - 0.1 K/UL    ABS. IMM. GRANS. 0.2 (H) 0.00 - 0.04 K/UL    DF AUTOMATED     C REACTIVE PROTEIN, QT    Collection Time: 05/19/22  4:15 AM   Result Value Ref Range    C-Reactive protein 18.00 (H) 0.00 - 0.60 mg/dL   PROCALCITONIN    Collection Time: 05/19/22  4:15 AM   Result Value Ref Range    Procalcitonin 2.32 (H) 0 ng/mL   HEPATIC FUNCTION PANEL    Collection Time: 05/19/22  4:15 AM   Result Value Ref Range    Protein, total 6.8 6.4 - 8.2 g/dL    Albumin 1.6 (L) 3.5 - 5.0 g/dL    Globulin 5.2 (H) 2.0 - 4.0 g/dL    A-G Ratio 0.3 (L) 1.1 - 2.2      Bilirubin, total 0.5 0.2 - 1.0 mg/dL    Bilirubin, direct 0.2 0.0 - 0.2 mg/dL    Alk.  phosphatase 88 45 - 117 U/L    AST (SGOT) 1,463 (H) 15 - 37 U/L    ALT (SGPT) 1,461 (H) 12 - 78 U/L   HEPATITIS PANEL, ACUTE    Collection Time: 05/19/22  4:15 AM   Result Value Ref Range    Hepatitis A, IgM NONREACTIVE NONREACTIVE      __        Hepatitis B surface Ag <0.10 Index    Hep B surface Ag Interp. Negative Negative    __        Hepatitis B core, IgM NONREACTIVE NONREACTIVE    __        Hepatitis C virus Ab 6.93 Index    Hep C virus Ab Interp. Reactive (A) NONREACTIVE     METABOLIC PANEL, COMPREHENSIVE    Collection Time: 05/19/22  5:25 AM   Result Value Ref Range    Sodium 146 (H) 136 - 145 mmol/L    Potassium 4.0 3.5 - 5.1 mmol/L    Chloride 117 (H) 97 - 108 mmol/L    CO2 19 (L) 21 - 32 mmol/L    Anion gap 10 5 - 15 mmol/L    Glucose 67 65 - 100 mg/dL    BUN 67 (H) 6 - 20 mg/dL    Creatinine 1.70 (H) 0.55 - 1.02 mg/dL    BUN/Creatinine ratio 39 (H) 12 - 20      GFR est AA 36 (L) >60 ml/min/1.73m2    GFR est non-AA 29 (L) >60 ml/min/1.73m2    Calcium 8.8 8.5 - 10.1 mg/dL    Bilirubin, total 0.5 0.2 - 1.0 mg/dL    AST (SGOT) 1,420 (H) 15 - 37 U/L    ALT (SGPT) 1,551 (H) 12 - 78 U/L    Alk.  phosphatase 91 45 - 117 U/L    Protein, total 6.6 6.4 - 8.2 g/dL    Albumin 1.6 (L) 3.5 - 5.0 g/dL    Globulin 5.0 (H) 2.0 - 4.0 g/dL    A-G Ratio 0.3 (L) 1.1 - 2.2     PROTHROMBIN TIME + INR    Collection Time: 05/19/22  5:25 AM   Result Value Ref Range    Prothrombin time 21.5 (H) 11.9 - 14.6 sec    INR 1.9 (H) 0.9 - 1.1     GLUCOSE, POC    Collection Time: 05/19/22  5:29 AM   Result Value Ref Range    Glucose (POC) 66 65 - 117 mg/dL    Performed by [address] [place] [address], POC    Collection Time: 05/19/22  5:51 AM   Result Value Ref Range    Glucose (POC) 123 (H) 65 - 117 mg/dL    Performed by [facility]    TROPONIN-HIGH SENSITIVITY    Collection Time: 05/19/22  9:12 AM   Result Value Ref Range    Troponin-High Sensitivity 800 (HH) 0 - 51 ng/L   GLUCOSE, POC    Collection Time: 05/19/22  9:42 AM   Result Value Ref Range    Glucose (POC) 88 65 - 117 mg/dL    Performed by Saeed Carroll, POC    Collection Time: 05/19/22 11:40 AM   Result Value Ref Range    Glucose (POC) 91 65 - 117 mg/dL    Performed by Ashlyn Chery XR CHEST PORT   Final Result   Ill-defined haziness in the mid to lower lung zones suspect for mild   atelectatic changes and/or interstitial fluid or pleural fluid. US ABD LTD   Final Result   1. Question pericholecystic fluid. No identified gallstones or sludge. 2. Right pleural effusion. 3. Increased right renal echotexture for medical renal disease. XR CHEST PORT   Final Result   Intubation. Findings suggesting hydrostatic edema. XR CHEST PORT   Final Result   No evidence of an acute cardiopulmonary process.       XR CHEST PORT    (Results Pending)      Assessment:     Hospital Problems  Date Reviewed: 5/18/2022          Codes Class Noted POA    Sacral ulcer ([facility] [address] ICD-10-CM: X56.268  ICD-9-CM: 707.8  5/14/2022 Unknown          Elevated of transaminase, consistent with mild shock liver, hepatic congestion, congestive heart failure, hypoxia, hypotensive episode  Hepatitis C antibody positive, chronic liver disease on differential diagnosis,  And elevated of PT/INR, consistent with mild to moderate liver failure    Gallbladder sludge, but no CBD obstruction on prior ultrasound  Plan:     ICU monitoring, cardiac and pulmonary care,  Followed by nephrology for renal sufficiency  vit K will be given to see if  correct the patient's hyper coagulation status  Liver function test, PT/INR daily  Signed By: Shahbaz Howard MD     May 19, 2022         Thank you for allowing me to participate in this patients care  Cc Referring Physician   Christine Lira MD

## 2022-05-19 NOTE — CONSULTS
IMPRESSION:   1. Acute hypoxic respiratory failure  2. Sacral decubiti ulcer with Acinetobacter and Enterococcus  3. Severe sepsis with septic shock  4. Left foot wound  5. Transaminitis  6. Shock liver  7. Additional workup outlined below  8. Pt is at high risk of sudden decline and decompensation with life threatening consequenses and continued end organ dysfunction and failure  9. Pt is critically ill. Time spent with pt and staff actively rendering care, managing pt and coordinating care as stated below; 55 minutes, exclusive of any procedures      RECOMMENDATIONS/PLAN:   1. ICU monitoring  1. Ventilator for mechanical life support and prevent respiratory arrest with protective lung strategies  2. On assist control mode 30% FiO2 ABG acceptable we will decrease the rate, will continue the vent patient supposed to go to OR today if liver enzymes acceptable  3. Severe sepsis with decubiti ulcer and was supposed to go for sacral wound debridement because of liver failure it was canceled patient is on Unasyn and vancomycin and Unasyn was put on hold because of possibility of transaminitis and also INR was elevated  4. Chest x-ray showed mild congestive changes  5. CVP monitoring  6. IV vasopressors for circulatory shock refractory to fluids to maintain SBP> 90  7. Elevated troponin blood culture positive for Staphylococcus most likely contaminant  8. Transfuse prn to maintain Hgb > 7  9. Labs to follow electrolytes, renal function and and blood counts  10. Bronchial hygiene with respiratory therapy techniques, bronchodilators  11. Pt needs IV fluids with additives and Drug therapy requiring intensive monitoring for toxicity  12. Prescription drug management with home med reconciliation reviewed  13. DVT, SUP prophylaxis  14.  Will be available to assist in medical management while in the CCU pending disposition     [x] High complexity decision making was performed  [x] See my orders for details  HPI   68-year-old lady came in because of leg pain past medical history of hypertension diabetes mellitus peripheral vascular disease CVA she is bedbound she has leg wound and also has sacral decubiti ulcer and she was scheduled for laparoscopic colostomy and sacral wound debridement and amputation of the left tarsometatarsal because of wound infection but her condition got worse her liver enzyme was elevated INR was also elevated she was intubated transferred to ICU was getting vancomycin and Unasyn started on Levophed because of low blood pressure. PMH:  has a past medical history of Anemia, Chronic kidney disease, Diabetes mellitus ([address] Hemiplegia affecting dominant side, post-stroke ([address] (05/04/2022), HTN (hypertension), Hyperkalemia, Hyperlipidemia, Ill-defined condition, Neuropathy, PAD (peripheral artery disease) ([address] Sciatica, Stroke ([address] and UTI (urinary tract infection). PSH:   has a past surgical history that includes hx other surgical (Bilateral); hx amputation (Right); hx other surgical; hx cervical fusion; hx vascular stent (Bilateral); hx vascular stent (Bilateral); and hx gi. FHX: family history includes Cancer in her mother; Diabetes in her mother; Hypertension in her mother. SHX:  reports that she has never smoked. She has never used smokeless tobacco. She reports previous alcohol use. She reports that she does not use drugs.     ALL: No Known Allergies     MEDS:   [x] Reviewed - As Below   [] Not reviewed    Current Facility-Administered Medications   Medication    dextrose 10% infusion 0-250 mL    0.9% sodium chloride infusion 250 mL    insulin glargine (LANTUS) injection 10 Units    0.9% sodium chloride infusion 250 mL    0.9% sodium chloride infusion 250 mL    NOREPINephrine (LEVOPHED) 32,000 mcg in dextrose 5% 250 mL (128 mcg/mL) infusion    0.9% sodium chloride infusion 250 mL    propofol (DIPRIVAN) 10 mg/mL infusion    sodium zirconium cyclosilicate (LOKELMA) powder packet 10 g  Vancomycin - reminder to draw level 5/19 @ 1800 please    ampicillin-sulbactam (UNASYN) 3 g in 0.9% sodium chloride (MBP/ADV) 100 mL MBP    0.9% sodium chloride infusion 250 mL    sodium hypochlorite (HALF STRENGTH DAKIN'S) 0.25% irrigation (bottle)    glucose chewable tablet 16 g    glucagon (GLUCAGEN) injection 1 mg    insulin lispro (HUMALOG) injection    Vancomycin Pharmacy Dosing    sodium chloride (NS) flush 5-40 mL    acetaminophen (TYLENOL) tablet 650 mg    Or    acetaminophen (TYLENOL) suppository 650 mg    polyethylene glycol (MIRALAX) packet 17 g    ondansetron (ZOFRAN ODT) tablet 4 mg    Or    ondansetron (ZOFRAN) injection 4 mg    enoxaparin (LOVENOX) injection 40 mg    famotidine (PEPCID) tablet 20 mg    acidophilus-pectin, citrus tablet 2 Tablet    albuterol-ipratropium (DUO-NEB) 2.5 MG-0.5 MG/3 ML    amLODIPine (NORVASC) tablet 5 mg    artificial saliva (MOUTH KOTE) 1 Spray    aspirin delayed-release tablet 81 mg    brimonidine (ALPHAGAN) 0.2 % ophthalmic solution 1 Drop    cholestyramine-aspartame (QUESTRAN LIGHT) packet 4 g    fenofibrate nanocrystallized (TRICOR) tablet 48 mg    ferrous sulfate tablet 325 mg    gabapentin (NEURONTIN) capsule 300 mg    hydrALAZINE (APRESOLINE) tablet 50 mg    insulin glargine (LANTUS) injection 50 Units    latanoprost (XALATAN) 0.005 % ophthalmic solution 1 Drop    metoprolol tartrate (LOPRESSOR) tablet 50 mg    traMADoL (ULTRAM) tablet 25 mg      MAR reviewed and pertinent medications noted or modified as needed   Current Facility-Administered Medications   Medication    dextrose 10% infusion 0-250 mL    0.9% sodium chloride infusion 250 mL    insulin glargine (LANTUS) injection 10 Units    0.9% sodium chloride infusion 250 mL    0.9% sodium chloride infusion 250 mL    NOREPINephrine (LEVOPHED) 32,000 mcg in dextrose 5% 250 mL (128 mcg/mL) infusion    0.9% sodium chloride infusion 250 mL    propofol (DIPRIVAN) 10 mg/mL infusion    sodium zirconium cyclosilicate (LOKELMA) powder packet 10 g    Vancomycin - reminder to draw level 5/19 @ 1800 please    ampicillin-sulbactam (UNASYN) 3 g in 0.9% sodium chloride (MBP/ADV) 100 mL MBP    0.9% sodium chloride infusion 250 mL    sodium hypochlorite (HALF STRENGTH DAKIN'S) 0.25% irrigation (bottle)    glucose chewable tablet 16 g    glucagon (GLUCAGEN) injection 1 mg    insulin lispro (HUMALOG) injection    Vancomycin Pharmacy Dosing    sodium chloride (NS) flush 5-40 mL    acetaminophen (TYLENOL) tablet 650 mg    Or    acetaminophen (TYLENOL) suppository 650 mg    polyethylene glycol (MIRALAX) packet 17 g    ondansetron (ZOFRAN ODT) tablet 4 mg    Or    ondansetron (ZOFRAN) injection 4 mg    enoxaparin (LOVENOX) injection 40 mg    famotidine (PEPCID) tablet 20 mg    acidophilus-pectin, citrus tablet 2 Tablet    albuterol-ipratropium (DUO-NEB) 2.5 MG-0.5 MG/3 ML    amLODIPine (NORVASC) tablet 5 mg    artificial saliva (MOUTH KOTE) 1 Spray    aspirin delayed-release tablet 81 mg    brimonidine (ALPHAGAN) 0.2 % ophthalmic solution 1 Drop    cholestyramine-aspartame (QUESTRAN LIGHT) packet 4 g    fenofibrate nanocrystallized (TRICOR) tablet 48 mg    ferrous sulfate tablet 325 mg    gabapentin (NEURONTIN) capsule 300 mg    hydrALAZINE (APRESOLINE) tablet 50 mg    insulin glargine (LANTUS) injection 50 Units    latanoprost (XALATAN) 0.005 % ophthalmic solution 1 Drop    metoprolol tartrate (LOPRESSOR) tablet 50 mg    traMADoL (ULTRAM) tablet 25 mg      PMH:  has a past medical history of Anemia, Chronic kidney disease, Diabetes mellitus ([address] Hemiplegia affecting dominant side, post-stroke ([address] (05/04/2022), HTN (hypertension), Hyperkalemia, Hyperlipidemia, Ill-defined condition, Neuropathy, PAD (peripheral artery disease) ([address] Sciatica, Stroke ([address] and UTI (urinary tract infection).   PSH:   has a past surgical history that includes hx other surgical (Bilateral); hx amputation (Right); hx other surgical; hx cervical fusion; hx vascular stent (Bilateral); hx vascular stent (Bilateral); and hx gi. FHX: family history includes Cancer in her mother; Diabetes in her mother; Hypertension in her mother. SHX:  reports that she has never smoked. She has never used smokeless tobacco. She reports previous alcohol use. She reports that she does not use drugs. ROS:  Unable to obtain intubated on ventilator    Hemodynamics:    CO:    CI:    CVP:    SVR:   PAP Systolic:    PAP Diastolic:    PVR:    KG90:        Ventilator Settings:      Mode Rate TV Press PEEP FiO2 PIP Min. Vent   Assist control,Volume control    500 ml    5 cm H20 30 %  29 cm H2O  6.47 l/min        Vital Signs: Telemetry:    normal sinus rhythm Intake/Output:   Visit Vitals  BP (!) 119/58 (BP 1 Location: Left upper arm, BP Patient Position: At rest)   Pulse 65   Temp 97.5 °F (36.4 °C)   Resp 12   Ht 5' 7\" (1.702 m)   Wt 85.6 kg (188 lb 11.4 oz)   SpO2 100%   Breastfeeding No   BMI 29.56 kg/m²       Temp (24hrs), Av °F (36.7 °C), Min:97 °F (36.1 °C), Max:98.7 °F (37.1 °C)        O2 Device: Ventilator O2 Flow Rate (L/min): 1 l/min       Wt Readings from Last 4 Encounters:   22 85.6 kg (188 lb 11.4 oz)   04/10/22 86.6 kg (191 lb)   22 82.2 kg (181 lb 3.5 oz)   20 84.4 kg (186 lb)          Intake/Output Summary (Last 24 hours) at 2022 0754  Last data filed at 2022 0645  Gross per 24 hour   Intake 2927.35 ml   Output 740 ml   Net 2187.35 ml       Last shift:      No intake/output data recorded. Last 3 shifts:  1901 -  0700  In: 3522.4 [I.V.:1692.1]  Out: 1440 [Urine:1440]       Physical Exam:     General: intubated on vent  HEENT: NCAT, poor dentition, lips and mucosa dry  Eyes: anicteric; conjunctiva clear  Neck: no nodes, no cuff leak, trach midline; no accessory MM use.   Chest: no deformity,   Cardiac: R regular; no murmur;   Lungs: distant breath sounds; no wheezes  Abd: soft, NT, hypoactive BS  Ext: no edema; no joint swelling;  No clubbing  : NO giraldo, clear urine  Neuro: sedated;   Psych-  unable to assess  Skin: warm, dry, no cyanosis;   Pulses: 1-2+ Bilateral pedal, radial  Capillary: brisk; pale      DATA:    MAR reviewed and pertinent medications noted or modified as needed  MEDS:   Current Facility-Administered Medications   Medication    dextrose 10% infusion 0-250 mL    0.9% sodium chloride infusion 250 mL    insulin glargine (LANTUS) injection 10 Units    0.9% sodium chloride infusion 250 mL    0.9% sodium chloride infusion 250 mL    NOREPINephrine (LEVOPHED) 32,000 mcg in dextrose 5% 250 mL (128 mcg/mL) infusion    0.9% sodium chloride infusion 250 mL    propofol (DIPRIVAN) 10 mg/mL infusion    sodium zirconium cyclosilicate (LOKELMA) powder packet 10 g    Vancomycin - reminder to draw level 5/19 @ 1800 please    ampicillin-sulbactam (UNASYN) 3 g in 0.9% sodium chloride (MBP/ADV) 100 mL MBP    0.9% sodium chloride infusion 250 mL    sodium hypochlorite (HALF STRENGTH DAKIN'S) 0.25% irrigation (bottle)    glucose chewable tablet 16 g    glucagon (GLUCAGEN) injection 1 mg    insulin lispro (HUMALOG) injection    Vancomycin Pharmacy Dosing    sodium chloride (NS) flush 5-40 mL    acetaminophen (TYLENOL) tablet 650 mg    Or    acetaminophen (TYLENOL) suppository 650 mg    polyethylene glycol (MIRALAX) packet 17 g    ondansetron (ZOFRAN ODT) tablet 4 mg    Or    ondansetron (ZOFRAN) injection 4 mg    enoxaparin (LOVENOX) injection 40 mg    famotidine (PEPCID) tablet 20 mg    acidophilus-pectin, citrus tablet 2 Tablet    albuterol-ipratropium (DUO-NEB) 2.5 MG-0.5 MG/3 ML    amLODIPine (NORVASC) tablet 5 mg    artificial saliva (MOUTH KOTE) 1 Spray    aspirin delayed-release tablet 81 mg    brimonidine (ALPHAGAN) 0.2 % ophthalmic solution 1 Drop    cholestyramine-aspartame (QUESTRAN LIGHT) packet 4 g    fenofibrate nanocrystallized (TRICOR) tablet 48 mg    ferrous sulfate tablet 325 mg    gabapentin (NEURONTIN) capsule 300 mg    hydrALAZINE (APRESOLINE) tablet 50 mg    insulin glargine (LANTUS) injection 50 Units    latanoprost (XALATAN) 0.005 % ophthalmic solution 1 Drop    metoprolol tartrate (LOPRESSOR) tablet 50 mg    traMADoL (ULTRAM) tablet 25 mg        Labs:    Recent Labs     05/19/22 0525 05/19/22 0415 05/18/22 1600 05/18/22  1115 05/18/22  0714   WBC  --  13.1* 13.9*  --  17.2*   HGB  --  7.8* 8.1*  --  7.6*   PLT  --  314 368  --  448*   INR 1.9*  --  1.8* 2.0*  --      Recent Labs     05/19/22 0525 05/19/22 0415 05/19/22  0400 05/18/22 1600 05/18/22 1600 05/18/22  0714 05/18/22  0714 05/17/22  0604 05/17/22  0604   *  --  147*  --  144   < > 144   < > 143   K 4.0  --  4.0  --  5.0   < > 5.6*   < > 5.3*   *  --  118*  --  114*   < > 113*   < > 113*   CO2 19*  --  21  --  23   < > 22   < > 22   GLU 67  --  68  --  174*   < > 289*   < > 301*   BUN 67*  --  65*  --  68*   < > 66*   < > 48*   CREA 1.70*  --  1.67*  --  1.76*   < > 1.55*   < > 1.21*   CA 8.8  --  9.2  --  8.9   < > 8.3*   < > 8.7   MG  --   --   --   --   --   --  2.9*  --  2.6*   PHOS  --   --   --   --   --   --  3.9  --  3.9   ALB 1.6* 1.6* 1.6*   < > 1.7*   < > 1.6*   < > 1.6*   ALT 1,551* 1,461* 1,467*   < > 1,493*   < > 1,492*   < > 25    < > = values in this interval not displayed. Recent Labs     05/19/22  0400 05/18/22  1730 05/18/22  1100   PH 7.51* 7.52* 7.43   PCO2 26* 25* 35   PO2 68* 154* 118*   HCO3 23 23 24   FIO2 30.0 60.0 100     No results for input(s): CPK, CKNDX, TROIQ in the last 72 hours. No lab exists for component: CPKMB  No results found for: BNPP, BNP   Lab Results   Component Value Date/Time    Culture result: Heavy Acinetobacter baumannii complex 05/15/2022 07:15 PM    Culture result: Heavy Diphtheroids 05/15/2022 07:15 PM    Culture result:  Moderate Acinetobacter baumannii complex 05/14/2022 11:55 PM    Culture result: Moderate Enterococcus faecium 05/14/2022 11:55 PM    Culture result:  05/14/2022 11:55 PM     Light >2 organisms - contaminated specimen. suggest recollection Anaerobic gram negative rods Beta Lactamase Positive     Lab Results   Component Value Date/Time    TSH 2.880 12/12/2016 10:13 AM        Imaging:    Results from [place] encounter on 05/14/22    XR CHEST PORT    Narrative  Chest, frontal view, 5/18/2022    History: Intubation. Comparison: Including chest 5/14/2022. Findings: There are surgical hardware at the cervical spine. New endotracheal  tube tip is 4.4 cm the jennifer. The cardiac silhouette is stable. Lung volumes  are not significantly changed. Pulmonary vascular congestion and associated  opacities in the lungs suggesting hydrostatic edema are increased or accentuated  by patient rotation as compared to chest 5/14/2022. No pleural effusion or  pneumothorax is identified. The osseous structures are stable. Impression  Intubation. Findings suggesting hydrostatic edema. Results from [place] encounter on 04/03/22    CT HEAD WO CONT    Narrative  PROCEDURE: CT HEAD WO CONT    HISTORY:Altered mental status    COMPARISON:Head CT 3 March 2022    Department policy stipulates all CT scans at this facility are performed using  dose reduction optimization techniques as appropriate to the performed exam,  including the following: Automated exposure control, adjustments of the mA  and/or KVP according to the patient size, and the use of iterative  reconstruction technique. TECHNIQUE: Without IV contrast    Bones/extracranial soft tissues:  Maxillary sinuses and right frontal sinus  remains nearly completely opacified. Extra-axial spaces:  No hemorrhage, mass or focal fluid collection. Ventricles/sulci:  There is mild dilatation of the ventricles. Sulci are  prominent. Brain parenchyma:   An area of encephalomalacia in the left frontal lobe is  showing chronic evolution without evidence of hemorrhagic conversion. No other  focal lesions identified. No mass effect. There is good gray-white differentiation. There are  inhomogeneous areas of mildly decreased density in periventricular white matter. Impression  Chronic changes which appear stable. No acute or active intracranial process. Chronic paranasal sinus disease      This care involved high complexity decision making which includes independently reviewing the patient's past medical records, current laboratory results, medication profiles that were immediately available to me and actual Xray images at the bedside in order to assess, support vital system function, and to treat this degree of vital organ system failure, and to prevent further life threatening deterioration of the patients condition. I was in direct communication with the nursing staff throughout this time. Medical Decision Making Today  · Reviewed the flowsheet and previous days notes  · Reviewed and summarized records or history from previous days note or discussions with staff, family  · Parenteral controlled substances - Reviewed/ Adjusted / Radha Nemo / Started  · High Risk Drug therapy requiring intensive monitoring for toxicity: eg steroids, pressors, antibiotics  · Review and order of Clinical lab tests  · Review and Order of Radiology tests  · Review and Order of Medicine tests  · Independent visualization of radiologic Images  · Reviewed Ventilator / NiPPV  · I have personally reviewed the patients ECG / Telemetry  · Diagnostic endoscopies with identified risk factors      I have provided total of 55 minutes of critical care time rendering care exclusive of any procedures. During this entire length of time the patient's condition was unstable, unpredictable and critically ill in the CCU/ ICU.  I was immediately available to the patient whose care required several interactions with nursing, multidisciplinary team members leading to multiple interventions with fluid resuscitation and medication adjustments to optimize respiratory support, hemodynamic treatment, medication changes based on repeat labs results, reviews, exams and assessments. The reason for providing this level of medical care was due to a critical illness that impaired one or more vital organ systems, such that there was a high probability of sudden or life threatening deterioration in the patient's condition.

## 2022-05-19 NOTE — PROGRESS NOTES
General Daily Progress Note          Patient Name:   Eli Lagos       YOB: 1945       Age:  78 y.o. 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  Cipro    Admitted again yesterday concerning for worsening of sacral ulcer    During last admission patient was seen by infectious disease and also seen by the vascular surgeon patient had debridement of wound during the last admission    Patient seen by the infectious disease yesterday    Sacral wound infection recent cultures growing Pseudomonas resistant to Zosyn and meropenem    5/17    Patient alert awake not in distress  Patient was seen by the vascular surgeon    Vascular surgery planned for laparoscopic loop colostomy placement and sacral debridement then wound vacuum  Also try to close the wound on the left foot with TMA    5/18    Resting in the bed not in any distress  Blood sugars running high    Seen by infectious disease continue vancomycin and start on Unasyn    5/19    And went to the OR intubated because of elevated LFT and elevated INR  Surgery was canceled    On ventilator 40% O2  Propofol drip    Objective:     Visit Vitals  BP (!) 119/58 (BP 1 Location: Left upper arm, BP Patient Position: At rest)   Pulse 66   Temp 97.5 °F (36.4 °C)   Resp 12   Ht 5' 7\" (1.702 m)   Wt 85.6 kg (188 lb 11.4 oz)   SpO2 100%   Breastfeeding No   BMI 29.56 kg/m²        Recent Results (from the past 24 hour(s))   PLASMA, ALLOCATE    Collection Time: 05/18/22 11:00 AM   Result Value Ref Range    Unit number [id number]     Blood component type FP 24H,Thaw     Unit division 00 Status of unit Issued,final     TRANSFUSION STATUS Ok to transfuse     Unit number [id number]     Blood component type FP 24h,Thaw1     Unit division 00     Status of unit Issued,final     TRANSFUSION STATUS Ok to transfuse    BLOOD GAS, ARTERIAL    Collection Time: 05/18/22 11:00 AM   Result Value Ref Range    pH 7.43 7.35 - 7.45      PCO2 35 35 - 45 mmHg    PO2 118 (H) 75 - 100 mmHg    O2 SAT 99 >95 %    BICARBONATE 24 22 - 26 mmol/L    BASE DEFICIT 0.6 0 - 2 mmol/L    FIO2 100 %   PROTHROMBIN TIME + INR    Collection Time: 05/18/22 11:15 AM   Result Value Ref Range    Prothrombin time 22.6 (H) 11.9 - 14.6 sec    INR 2.0 (H) 0.9 - 1.1     TROPONIN-HIGH SENSITIVITY    Collection Time: 05/18/22 11:15 AM   Result Value Ref Range    Troponin-High Sensitivity 890 (HH) 0 - 51 ng/L   NT-PRO BNP    Collection Time: 05/18/22 11:15 AM   Result Value Ref Range    NT pro-BNP 23,174 (H) <450 pg/mL   EKG, 12 LEAD, INITIAL    Collection Time: 05/18/22  1:22 PM   Result Value Ref Range    Ventricular Rate 63 BPM    Atrial Rate 63 BPM    P-R Interval 132 ms    QRS Duration 92 ms    Q-T Interval 444 ms    QTC Calculation (Bezet) 454 ms    Calculated P Axis 55 degrees    Calculated R Axis 40 degrees    Calculated T Axis 133 degrees    Diagnosis       Normal sinus rhythm  ST & T wave abnormality, consider anterolateral ischemia  Abnormal ECG  No previous ECGs available  Confirmed by Burgess Horn ([id number]) on 5/18/2022 2:13:07 PM     GLUCOSE, POC    Collection Time: 05/18/22  1:51 PM   Result Value Ref Range    Glucose (POC) 208 (H) 65 - 117 mg/dL    Performed by Eder Muniz TROUGH    Collection Time: 05/18/22  4:00 PM   Result Value Ref Range    Vancomycin,trough 28.2 (HH) 5.0 - 10.0 ug/mL   CBC WITH AUTOMATED DIFF    Collection Time: 05/18/22  4:00 PM   Result Value Ref Range    WBC 13.9 (H) 3.6 - 11.0 K/uL    RBC 2.87 (L) 3.80 - 5.20 M/uL    HGB 8.1 (L) 11.5 - 16.0 g/dL    HCT 25.3 (L) 35.0 - 47.0 %    MCV 88.2 80.0 - 99.0 FL    MCH 28.2 26.0 - 34.0 PG    MCHC 32.0 30.0 - 36.5 g/dL    RDW 16.6 (H) 11.5 - 14.5 %    PLATELET 791 013 - 348 K/uL    MPV 10.7 8.9 - 12.9 FL    NRBC 4.0 (H) 0.0  WBC    ABSOLUTE NRBC 0.59 (H) 0.00 - 0.01 K/uL    NEUTROPHILS 79 (H) 32 - 75 %    LYMPHOCYTES 19 12 - 49 %    MONOCYTES 2 (L) 5 - 13 %    EOSINOPHILS 0 0 - 7 %    BASOPHILS 0 0 - 1 %    NRBC 7.0  WBC    IMMATURE GRANULOCYTES 0 %    ABS. NEUTROPHILS 11.0 (H) 1.8 - 8.0 K/UL    ABS. LYMPHOCYTES 2.6 0.8 - 3.5 K/UL    ABS. MONOCYTES 0.3 0.0 - 1.0 K/UL    ABS. EOSINOPHILS 0.0 0.0 - 0.4 K/UL    ABS. BASOPHILS 0.0 0.0 - 0.1 K/UL    ABSOLUTE NRBC 0.97 K/uL    ABS. IMM. GRANS. 0.0 K/UL    DF Manual      RBC COMMENTS Hypochromia  1+       METABOLIC PANEL, COMPREHENSIVE    Collection Time: 05/18/22  4:00 PM   Result Value Ref Range    Sodium 144 136 - 145 mmol/L    Potassium 5.0 3.5 - 5.1 mmol/L    Chloride 114 (H) 97 - 108 mmol/L    CO2 23 21 - 32 mmol/L    Anion gap 7 5 - 15 mmol/L    Glucose 174 (H) 65 - 100 mg/dL    BUN 68 (H) 6 - 20 mg/dL    Creatinine 1.76 (H) 0.55 - 1.02 mg/dL    BUN/Creatinine ratio 39 (H) 12 - 20      GFR est AA 34 (L) >60 ml/min/1.73m2    GFR est non-AA 28 (L) >60 ml/min/1.73m2    Calcium 8.9 8.5 - 10.1 mg/dL    Bilirubin, total 0.6 0.2 - 1.0 mg/dL    AST (SGOT) >2,000 (H) 15 - 37 U/L    ALT (SGPT) 1,493 (H) 12 - 78 U/L    Alk.  phosphatase 91 45 - 117 U/L    Protein, total 6.0 (L) 6.4 - 8.2 g/dL    Albumin 1.7 (L) 3.5 - 5.0 g/dL    Globulin 4.3 (H) 2.0 - 4.0 g/dL    A-G Ratio 0.4 (L) 1.1 - 2.2     PROTHROMBIN TIME + INR    Collection Time: 05/18/22  4:00 PM   Result Value Ref Range    Prothrombin time 21.1 (H) 11.9 - 14.6 sec    INR 1.8 (H) 0.9 - 1.1     ECHO ADULT COMPLETE    Collection Time: 05/18/22  5:12 PM   Result Value Ref Range    LV EDV A4C 66 mL    LV ESV A4C 61 mL    IVSd 1.3 (A) 0.6 - 0.9 cm    LVIDd 4.6 3.9 - 5.3 cm    LVIDs 4.0 cm    LVOT Mean Gradient 1 mmHg    LVOT VTI 10.6 cm    LVOT Peak Velocity 0.5 m/s    LVOT Peak Gradient 1 mmHg    LVPWd 1.3 (A) 0.6 - 0.9 cm    LV E' Lateral Velocity 5 cm/s    LV E' Septal Velocity 8 cm/s    LV Ejection Fraction A4C 8 %    LA Major [place] 5.0 cm    LA Area 4C 14.3 cm2    LA Diameter 3.5 cm    AV Mean Gradient 4 mmHg    AV VTI 24.5 cm    AV Mean Velocity 0.9 m/s    AV Peak Velocity 1.3 m/s    AV Peak Gradient 6 mmHg    MR .0 cm    MV Mean Gradient 2 mmHg    MV VTI 53.3 cm    MV Mean Velocity 0.6 m/s    MR Peak Velocity 4.0 m/s    MR Peak Gradient 64 mmHg    MV Max Velocity 1.4 m/s    MV Peak Gradient 8 mmHg    MV A Velocity 0.94 m/s    MV E Velocity 1.37 m/s    NE Max Velocity 1.0 m/s    Pulmonary Artery EDP 4 mmHg    PV Mean Gradient 1 mmHg    PV VTI 14.3 cm    PV Mean Velocity 0.5 m/s    PV Max Velocity 0.8 m/s    PV Peak Gradient 3 mmHg    TR Max Velocity 2.82 m/s    TR Peak Gradient 32 mmHg    TAPSE 1.4 (A) 1.7 cm    Fractional Shortening 2D 13 28 - 44 %    LV ESV Index A4C 32 mL/m2    LV EDV Index A4C 35 mL/m2    LVIDd Index 2.41 cm/m2    LVIDs Index 2.09 cm/m2    LV RWT Ratio 0.57     LV Mass 2D 230.2 (A) 67 - 162 g    LV Mass 2D Index 120.5 (A) 43 - 95 g/m2    MV E/A 1.46     E/E' Ratio (Averaged) 22.26     E/E' Lateral 27.40     E/E' Septal 17.13     LA Size Index 1.83 cm/m2    AV Velocity Ratio 0.38     LVOT:AV VTI Index 0.43     MV:LVOT VTI Index 5.03     RV Basal Dimension 2.6 cm    RV Mid Dimension 2.2 cm    RV Free Wall Peak S' 8 cm/s    Est. RA Pressure 3 mmHg    RVSP 35 mmHg   BLOOD GAS, ARTERIAL    Collection Time: 05/18/22  5:30 PM   Result Value Ref Range    pH 7.52 (H) 7.35 - 7.45      PCO2 25 (L) 35 - 45 mmHg    PO2 154 (H) 75 - 100 mmHg    O2  >95 %    BICARBONATE 23 22 - 26 mmol/L    BASE DEFICIT 1.5 0 - 2 mmol/L    O2 METHOD VENT      FIO2 60.0 %    MODE Assist Control/Volume Control      Tidal volume 550      SET RATE 12      EPAP/CPAP/PEEP 5.0      SITE Right Radial      EVELIN'S TEST PASS     GLUCOSE, POC    Collection Time: 05/18/22  5:37 PM   Result Value Ref Range    Glucose (POC) 146 (H) 65 - 117 mg/dL    Performed by Caitlyn Serum    GLUCOSE, POC    Collection Time: 05/18/22 11:50 PM   Result Value Ref Range    Glucose (POC) 94 65 - 117 mg/dL    Performed by [address] [place] [address], POC    Collection Time: 05/19/22 12:04 AM   Result Value Ref Range    Glucose (POC) 99 65 - 117 mg/dL    Performed by [facility]    METABOLIC PANEL, COMPREHENSIVE    Collection Time: 05/19/22  4:00 AM   Result Value Ref Range    Sodium 147 (H) 136 - 145 mmol/L    Potassium 4.0 3.5 - 5.1 mmol/L    Chloride 118 (H) 97 - 108 mmol/L    CO2 21 21 - 32 mmol/L    Anion gap 8 5 - 15 mmol/L    Glucose 68 65 - 100 mg/dL    BUN 65 (H) 6 - 20 mg/dL    Creatinine 1.67 (H) 0.55 - 1.02 mg/dL    BUN/Creatinine ratio 39 (H) 12 - 20      GFR est AA 36 (L) >60 ml/min/1.73m2    GFR est non-AA 30 (L) >60 ml/min/1.73m2    Calcium 9.2 8.5 - 10.1 mg/dL    Bilirubin, total 0.5 0.2 - 1.0 mg/dL    AST (SGOT) 1,458 (H) 15 - 37 U/L    ALT (SGPT) 1,467 (H) 12 - 78 U/L    Alk.  phosphatase 89 45 - 117 U/L    Protein, total 6.6 6.4 - 8.2 g/dL    Albumin 1.6 (L) 3.5 - 5.0 g/dL    Globulin 5.0 (H) 2.0 - 4.0 g/dL    A-G Ratio 0.3 (L) 1.1 - 2.2     BLOOD GAS, ARTERIAL    Collection Time: 05/19/22  4:00 AM   Result Value Ref Range    pH 7.51 (H) 7.35 - 7.45      PCO2 26 (L) 35 - 45 mmHg    PO2 68 (L) 75 - 100 mmHg    O2 SAT 96 >95 %    BICARBONATE 23 22 - 26 mmol/L    BASE DEFICIT 1.4 0 - 2 mmol/L    O2 METHOD VENT      FIO2 30.0 %    MODE Assist Control/Volume Control      Tidal volume 550      SET RATE 12      IPAP/PIP 0      EPAP/CPAP/PEEP 5.0      SITE Arterial Line      EVELIN'S TEST PASS     CBC WITH AUTOMATED DIFF    Collection Time: 05/19/22  4:15 AM   Result Value Ref Range    WBC 13.1 (H) 3.6 - 11.0 K/uL    RBC 2.76 (L) 3.80 - 5.20 M/uL    HGB 7.8 (L) 11.5 - 16.0 g/dL    HCT 24.0 (L) 35.0 - 47.0 %    MCV 87.0 80.0 - 99.0 FL    MCH 28.3 26.0 - 34.0 PG    MCHC 32.5 30.0 - 36.5 g/dL    RDW 16.9 (H) 11.5 - 14.5 %    PLATELET 028 957 - 220 K/uL    MPV 10.8 8.9 - 12.9 FL    NRBC 5.3 (H) 0.0  WBC    ABSOLUTE NRBC 0.69 (H) 0.00 - 0.01 K/uL    NEUTROPHILS 75 32 - 75 %    LYMPHOCYTES 15 12 - 49 %    MONOCYTES 5 5 - 13 %    EOSINOPHILS 2 0 - 7 %    BASOPHILS 1 0 - 1 %    IMMATURE GRANULOCYTES 2 (H) 0 - 0.5 %    ABS. NEUTROPHILS 9.9 (H) 1.8 - 8.0 K/UL    ABS. LYMPHOCYTES 1.9 0.8 - 3.5 K/UL    ABS. MONOCYTES 0.7 0.0 - 1.0 K/UL    ABS. EOSINOPHILS 0.3 0.0 - 0.4 K/UL    ABS. BASOPHILS 0.1 0.0 - 0.1 K/UL    ABS. IMM. GRANS. 0.2 (H) 0.00 - 0.04 K/UL    DF AUTOMATED     C REACTIVE PROTEIN, QT    Collection Time: 05/19/22  4:15 AM   Result Value Ref Range    C-Reactive protein 18.00 (H) 0.00 - 0.60 mg/dL   PROCALCITONIN    Collection Time: 05/19/22  4:15 AM   Result Value Ref Range    Procalcitonin 2.32 (H) 0 ng/mL   HEPATIC FUNCTION PANEL    Collection Time: 05/19/22  4:15 AM   Result Value Ref Range    Protein, total 6.8 6.4 - 8.2 g/dL    Albumin 1.6 (L) 3.5 - 5.0 g/dL    Globulin 5.2 (H) 2.0 - 4.0 g/dL    A-G Ratio 0.3 (L) 1.1 - 2.2      Bilirubin, total 0.5 0.2 - 1.0 mg/dL    Bilirubin, direct 0.2 0.0 - 0.2 mg/dL    Alk. phosphatase 88 45 - 117 U/L    AST (SGOT) 1,463 (H) 15 - 37 U/L    ALT (SGPT) 1,461 (H) 12 - 78 U/L   METABOLIC PANEL, COMPREHENSIVE    Collection Time: 05/19/22  5:25 AM   Result Value Ref Range    Sodium 146 (H) 136 - 145 mmol/L    Potassium 4.0 3.5 - 5.1 mmol/L    Chloride 117 (H) 97 - 108 mmol/L    CO2 19 (L) 21 - 32 mmol/L    Anion gap 10 5 - 15 mmol/L    Glucose 67 65 - 100 mg/dL    BUN 67 (H) 6 - 20 mg/dL    Creatinine 1.70 (H) 0.55 - 1.02 mg/dL    BUN/Creatinine ratio 39 (H) 12 - 20      GFR est AA 36 (L) >60 ml/min/1.73m2    GFR est non-AA 29 (L) >60 ml/min/1.73m2    Calcium 8.8 8.5 - 10.1 mg/dL    Bilirubin, total 0.5 0.2 - 1.0 mg/dL    AST (SGOT) 1,420 (H) 15 - 37 U/L    ALT (SGPT) 1,551 (H) 12 - 78 U/L    Alk.  phosphatase 91 45 - 117 U/L    Protein, total 6.6 6.4 - 8.2 g/dL    Albumin 1.6 (L) 3.5 - 5.0 g/dL    Globulin 5.0 (H) 2.0 - 4.0 g/dL    A-G Ratio 0.3 (L) 1.1 - 2.2     PROTHROMBIN TIME + INR    Collection Time: 05/19/22  5:25 AM   Result Value Ref Range    Prothrombin time 21.5 (H) 11.9 - 14.6 sec    INR 1.9 (H) 0.9 - 1.1     GLUCOSE, POC    Collection Time: 05/19/22  5:29 AM   Result Value Ref Range    Glucose (POC) 66 65 - 117 mg/dL    Performed by [address] [place] [address], POC    Collection Time: 05/19/22  5:51 AM   Result Value Ref Range    Glucose (POC) 123 (H) 65 - 117 mg/dL    Performed by [facility]    TROPONIN-HIGH SENSITIVITY    Collection Time: 05/19/22  9:12 AM   Result Value Ref Range    Troponin-High Sensitivity 800 (HH) 0 - 51 ng/L   GLUCOSE, POC    Collection Time: 05/19/22  9:42 AM   Result Value Ref Range    Glucose (POC) 88 65 - 117 mg/dL    Performed by Vijay Lovell      [unfilled]      Review of Systems    Not a good historian e. Physical Exam:      Constitutional: Alert awake not in distress  HENT:   Head: Normocephalic and atraumatic. Eyes: Pupils are equal, round, and reactive to light. EOM are normal.   Cardiovascular: Normal rate, regular rhythm and normal heart sounds. Pulmonary/Chest: Breath sounds normal. No wheezes. No rales. Exhibits no tenderness. Abdominal: Soft. Bowel sounds are normal. There is no abdominal tenderness. There is no rebound and no guarding. Musculoskeletal: Normal range of motion. Neurological: Alert awake bedbound    Skin sacral decubitus ulcer and left foot wound    XR CHEST PORT   Final Result   Ill-defined haziness in the mid to lower lung zones suspect for mild   atelectatic changes and/or interstitial fluid or pleural fluid. XR CHEST PORT   Final Result   Intubation. Findings suggesting hydrostatic edema. XR CHEST PORT   Final Result   No evidence of an acute cardiopulmonary process.       US ABD LTD    (Results Pending)   XR CHEST PORT    (Results Pending)        Recent Results (from the past 24 hour(s))   PLASMA, ALLOCATE    Collection Time: 05/18/22 11:00 AM   Result Value Ref Range    Unit number [id number]     Blood component type FP 24H,Thaw     Unit division 00     Status of unit Issued,final     TRANSFUSION STATUS Ok to transfuse     Unit number [id number]     Blood component type FP 24h,Thaw1     Unit division 00     Status of unit Issued,final     TRANSFUSION STATUS Ok to transfuse    BLOOD GAS, ARTERIAL    Collection Time: 05/18/22 11:00 AM   Result Value Ref Range    pH 7.43 7.35 - 7.45      PCO2 35 35 - 45 mmHg    PO2 118 (H) 75 - 100 mmHg    O2 SAT 99 >95 %    BICARBONATE 24 22 - 26 mmol/L    BASE DEFICIT 0.6 0 - 2 mmol/L    FIO2 100 %   PROTHROMBIN TIME + INR    Collection Time: 05/18/22 11:15 AM   Result Value Ref Range    Prothrombin time 22.6 (H) 11.9 - 14.6 sec    INR 2.0 (H) 0.9 - 1.1     TROPONIN-HIGH SENSITIVITY    Collection Time: 05/18/22 11:15 AM   Result Value Ref Range    Troponin-High Sensitivity 890 (HH) 0 - 51 ng/L   NT-PRO BNP    Collection Time: 05/18/22 11:15 AM   Result Value Ref Range    NT pro-BNP 23,174 (H) <450 pg/mL   EKG, 12 LEAD, INITIAL    Collection Time: 05/18/22  1:22 PM   Result Value Ref Range    Ventricular Rate 63 BPM    Atrial Rate 63 BPM    P-R Interval 132 ms    QRS Duration 92 ms    Q-T Interval 444 ms    QTC Calculation (Bezet) 454 ms    Calculated P Axis 55 degrees    Calculated R Axis 40 degrees    Calculated T Axis 133 degrees    Diagnosis       Normal sinus rhythm  ST & T wave abnormality, consider anterolateral ischemia  Abnormal ECG  No previous ECGs available  Confirmed by Triston Austin ([id number]) on 5/18/2022 2:13:07 PM     GLUCOSE, POC    Collection Time: 05/18/22  1:51 PM   Result Value Ref Range    Glucose (POC) 208 (H) 65 - 117 mg/dL    Performed by Harleen Kaur TROUGH    Collection Time: 05/18/22  4:00 PM   Result Value Ref Range    Vancomycin,trough 28.2 (HH) 5.0 - 10.0 ug/mL   CBC WITH AUTOMATED DIFF    Collection Time: 05/18/22  4:00 PM   Result Value Ref Range    WBC 13.9 (H) 3.6 - 11.0 K/uL    RBC 2.87 (L) 3.80 - 5.20 M/uL    HGB 8.1 (L) 11.5 - 16.0 g/dL    HCT 25.3 (L) 35.0 - 47.0 %    MCV 88.2 80.0 - 99.0 FL    MCH 28.2 26.0 - 34.0 PG    MCHC 32.0 30.0 - 36.5 g/dL    RDW 16.6 (H) 11.5 - 14.5 %    PLATELET 013 969 - 756 K/uL    MPV 10.7 8.9 - 12.9 FL    NRBC 4.0 (H) 0.0  WBC    ABSOLUTE NRBC 0.59 (H) 0.00 - 0.01 K/uL    NEUTROPHILS 79 (H) 32 - 75 %    LYMPHOCYTES 19 12 - 49 %    MONOCYTES 2 (L) 5 - 13 %    EOSINOPHILS 0 0 - 7 %    BASOPHILS 0 0 - 1 %    NRBC 7.0  WBC    IMMATURE GRANULOCYTES 0 %    ABS. NEUTROPHILS 11.0 (H) 1.8 - 8.0 K/UL    ABS. LYMPHOCYTES 2.6 0.8 - 3.5 K/UL    ABS. MONOCYTES 0.3 0.0 - 1.0 K/UL    ABS. EOSINOPHILS 0.0 0.0 - 0.4 K/UL    ABS. BASOPHILS 0.0 0.0 - 0.1 K/UL    ABSOLUTE NRBC 0.97 K/uL    ABS. IMM. GRANS. 0.0 K/UL    DF Manual      RBC COMMENTS Hypochromia  1+       METABOLIC PANEL, COMPREHENSIVE    Collection Time: 05/18/22  4:00 PM   Result Value Ref Range    Sodium 144 136 - 145 mmol/L    Potassium 5.0 3.5 - 5.1 mmol/L    Chloride 114 (H) 97 - 108 mmol/L    CO2 23 21 - 32 mmol/L    Anion gap 7 5 - 15 mmol/L    Glucose 174 (H) 65 - 100 mg/dL    BUN 68 (H) 6 - 20 mg/dL    Creatinine 1.76 (H) 0.55 - 1.02 mg/dL    BUN/Creatinine ratio 39 (H) 12 - 20      GFR est AA 34 (L) >60 ml/min/1.73m2    GFR est non-AA 28 (L) >60 ml/min/1.73m2    Calcium 8.9 8.5 - 10.1 mg/dL    Bilirubin, total 0.6 0.2 - 1.0 mg/dL    AST (SGOT) >2,000 (H) 15 - 37 U/L    ALT (SGPT) 1,493 (H) 12 - 78 U/L    Alk.  phosphatase 91 45 - 117 U/L    Protein, total 6.0 (L) 6.4 - 8.2 g/dL    Albumin 1.7 (L) 3.5 - 5.0 g/dL    Globulin 4.3 (H) 2.0 - 4.0 g/dL    A-G Ratio 0.4 (L) 1.1 - 2.2     PROTHROMBIN TIME + INR    Collection Time: 05/18/22  4:00 PM   Result Value Ref Range    Prothrombin time 21.1 (H) 11.9 - 14.6 sec    INR 1.8 (H) 0.9 - 1.1     ECHO ADULT COMPLETE    Collection Time: 05/18/22  5:12 PM   Result Value Ref Range    LV EDV A4C 66 mL    LV ESV A4C 61 mL    IVSd 1.3 (A) 0.6 - 0.9 cm    LVIDd 4.6 3.9 - 5.3 cm    LVIDs 4.0 cm    LVOT Mean Gradient 1 mmHg    LVOT VTI 10.6 cm    LVOT Peak Velocity 0.5 m/s    LVOT Peak Gradient 1 mmHg    LVPWd 1.3 (A) 0.6 - 0.9 cm    LV E' Lateral Velocity 5 cm/s    LV E' Septal Velocity 8 cm/s    LV Ejection Fraction A4C 8 %    LA Major [place] 5.0 cm    LA Area 4C 14.3 cm2    LA Diameter 3.5 cm    AV Mean Gradient 4 mmHg    AV VTI 24.5 cm    AV Mean Velocity 0.9 m/s    AV Peak Velocity 1.3 m/s    AV Peak Gradient 6 mmHg    MR .0 cm    MV Mean Gradient 2 mmHg    MV VTI 53.3 cm    MV Mean Velocity 0.6 m/s    MR Peak Velocity 4.0 m/s    MR Peak Gradient 64 mmHg    MV Max Velocity 1.4 m/s    MV Peak Gradient 8 mmHg    MV A Velocity 0.94 m/s    MV E Velocity 1.37 m/s    IL Max Velocity 1.0 m/s    Pulmonary Artery EDP 4 mmHg    PV Mean Gradient 1 mmHg    PV VTI 14.3 cm    PV Mean Velocity 0.5 m/s    PV Max Velocity 0.8 m/s    PV Peak Gradient 3 mmHg    TR Max Velocity 2.82 m/s    TR Peak Gradient 32 mmHg    TAPSE 1.4 (A) 1.7 cm    Fractional Shortening 2D 13 28 - 44 %    LV ESV Index A4C 32 mL/m2    LV EDV Index A4C 35 mL/m2    LVIDd Index 2.41 cm/m2    LVIDs Index 2.09 cm/m2    LV RWT Ratio 0.57     LV Mass 2D 230.2 (A) 67 - 162 g    LV Mass 2D Index 120.5 (A) 43 - 95 g/m2    MV E/A 1.46     E/E' Ratio (Averaged) 22.26     E/E' Lateral 27.40     E/E' Septal 17.13     LA Size Index 1.83 cm/m2    AV Velocity Ratio 0.38     LVOT:AV VTI Index 0.43     MV:LVOT VTI Index 5.03     RV Basal Dimension 2.6 cm    RV Mid Dimension 2.2 cm    RV Free Wall Peak S' 8 cm/s    Est. RA Pressure 3 mmHg    RVSP 35 mmHg   BLOOD GAS, ARTERIAL    Collection Time: 05/18/22  5:30 PM   Result Value Ref Range    pH 7.52 (H) 7.35 - 7.45      PCO2 25 (L) 35 - 45 mmHg    PO2 154 (H) 75 - 100 mmHg    O2  >95 %    BICARBONATE 23 22 - 26 mmol/L    BASE DEFICIT 1.5 0 - 2 mmol/L    O2 METHOD VENT FIO2 60.0 %    MODE Assist Control/Volume Control      Tidal volume 550      SET RATE 12      EPAP/CPAP/PEEP 5.0      SITE Right Radial      EVELIN'S TEST PASS     GLUCOSE, POC    Collection Time: 05/18/22  5:37 PM   Result Value Ref Range    Glucose (POC) 146 (H) 65 - 117 mg/dL    Performed by Oni Munroe    GLUCOSE, POC    Collection Time: 05/18/22 11:50 PM   Result Value Ref Range    Glucose (POC) 94 65 - 117 mg/dL    Performed by [address] [place] [address], POC    Collection Time: 05/19/22 12:04 AM   Result Value Ref Range    Glucose (POC) 99 65 - 117 mg/dL    Performed by [facility]    METABOLIC PANEL, COMPREHENSIVE    Collection Time: 05/19/22  4:00 AM   Result Value Ref Range    Sodium 147 (H) 136 - 145 mmol/L    Potassium 4.0 3.5 - 5.1 mmol/L    Chloride 118 (H) 97 - 108 mmol/L    CO2 21 21 - 32 mmol/L    Anion gap 8 5 - 15 mmol/L    Glucose 68 65 - 100 mg/dL    BUN 65 (H) 6 - 20 mg/dL    Creatinine 1.67 (H) 0.55 - 1.02 mg/dL    BUN/Creatinine ratio 39 (H) 12 - 20      GFR est AA 36 (L) >60 ml/min/1.73m2    GFR est non-AA 30 (L) >60 ml/min/1.73m2    Calcium 9.2 8.5 - 10.1 mg/dL    Bilirubin, total 0.5 0.2 - 1.0 mg/dL    AST (SGOT) 1,458 (H) 15 - 37 U/L    ALT (SGPT) 1,467 (H) 12 - 78 U/L    Alk.  phosphatase 89 45 - 117 U/L    Protein, total 6.6 6.4 - 8.2 g/dL    Albumin 1.6 (L) 3.5 - 5.0 g/dL    Globulin 5.0 (H) 2.0 - 4.0 g/dL    A-G Ratio 0.3 (L) 1.1 - 2.2     BLOOD GAS, ARTERIAL    Collection Time: 05/19/22  4:00 AM   Result Value Ref Range    pH 7.51 (H) 7.35 - 7.45      PCO2 26 (L) 35 - 45 mmHg    PO2 68 (L) 75 - 100 mmHg    O2 SAT 96 >95 %    BICARBONATE 23 22 - 26 mmol/L    BASE DEFICIT 1.4 0 - 2 mmol/L    O2 METHOD VENT      FIO2 30.0 %    MODE Assist Control/Volume Control      Tidal volume 550      SET RATE 12      IPAP/PIP 0      EPAP/CPAP/PEEP 5.0      SITE Arterial Line      EVELIN'S TEST PASS     CBC WITH AUTOMATED DIFF    Collection Time: 05/19/22  4:15 AM   Result Value Ref Range WBC 13.1 (H) 3.6 - 11.0 K/uL    RBC 2.76 (L) 3.80 - 5.20 M/uL    HGB 7.8 (L) 11.5 - 16.0 g/dL    HCT 24.0 (L) 35.0 - 47.0 %    MCV 87.0 80.0 - 99.0 FL    MCH 28.3 26.0 - 34.0 PG    MCHC 32.5 30.0 - 36.5 g/dL    RDW 16.9 (H) 11.5 - 14.5 %    PLATELET 676 531 - 364 K/uL    MPV 10.8 8.9 - 12.9 FL    NRBC 5.3 (H) 0.0  WBC    ABSOLUTE NRBC 0.69 (H) 0.00 - 0.01 K/uL    NEUTROPHILS 75 32 - 75 %    LYMPHOCYTES 15 12 - 49 %    MONOCYTES 5 5 - 13 %    EOSINOPHILS 2 0 - 7 %    BASOPHILS 1 0 - 1 %    IMMATURE GRANULOCYTES 2 (H) 0 - 0.5 %    ABS. NEUTROPHILS 9.9 (H) 1.8 - 8.0 K/UL    ABS. LYMPHOCYTES 1.9 0.8 - 3.5 K/UL    ABS. MONOCYTES 0.7 0.0 - 1.0 K/UL    ABS. EOSINOPHILS 0.3 0.0 - 0.4 K/UL    ABS. BASOPHILS 0.1 0.0 - 0.1 K/UL    ABS. IMM. GRANS. 0.2 (H) 0.00 - 0.04 K/UL    DF AUTOMATED     C REACTIVE PROTEIN, QT    Collection Time: 05/19/22  4:15 AM   Result Value Ref Range    C-Reactive protein 18.00 (H) 0.00 - 0.60 mg/dL   PROCALCITONIN    Collection Time: 05/19/22  4:15 AM   Result Value Ref Range    Procalcitonin 2.32 (H) 0 ng/mL   HEPATIC FUNCTION PANEL    Collection Time: 05/19/22  4:15 AM   Result Value Ref Range    Protein, total 6.8 6.4 - 8.2 g/dL    Albumin 1.6 (L) 3.5 - 5.0 g/dL    Globulin 5.2 (H) 2.0 - 4.0 g/dL    A-G Ratio 0.3 (L) 1.1 - 2.2      Bilirubin, total 0.5 0.2 - 1.0 mg/dL    Bilirubin, direct 0.2 0.0 - 0.2 mg/dL    Alk.  phosphatase 88 45 - 117 U/L    AST (SGOT) 1,463 (H) 15 - 37 U/L    ALT (SGPT) 1,461 (H) 12 - 78 U/L   METABOLIC PANEL, COMPREHENSIVE    Collection Time: 05/19/22  5:25 AM   Result Value Ref Range    Sodium 146 (H) 136 - 145 mmol/L    Potassium 4.0 3.5 - 5.1 mmol/L    Chloride 117 (H) 97 - 108 mmol/L    CO2 19 (L) 21 - 32 mmol/L    Anion gap 10 5 - 15 mmol/L    Glucose 67 65 - 100 mg/dL    BUN 67 (H) 6 - 20 mg/dL    Creatinine 1.70 (H) 0.55 - 1.02 mg/dL    BUN/Creatinine ratio 39 (H) 12 - 20      GFR est AA 36 (L) >60 ml/min/1.73m2    GFR est non-AA 29 (L) >60 ml/min/1.73m2 Calcium 8.8 8.5 - 10.1 mg/dL    Bilirubin, total 0.5 0.2 - 1.0 mg/dL    AST (SGOT) 1,420 (H) 15 - 37 U/L    ALT (SGPT) 1,551 (H) 12 - 78 U/L    Alk.  phosphatase 91 45 - 117 U/L    Protein, total 6.6 6.4 - 8.2 g/dL    Albumin 1.6 (L) 3.5 - 5.0 g/dL    Globulin 5.0 (H) 2.0 - 4.0 g/dL    A-G Ratio 0.3 (L) 1.1 - 2.2     PROTHROMBIN TIME + INR    Collection Time: 05/19/22  5:25 AM   Result Value Ref Range    Prothrombin time 21.5 (H) 11.9 - 14.6 sec    INR 1.9 (H) 0.9 - 1.1     GLUCOSE, POC    Collection Time: 05/19/22  5:29 AM   Result Value Ref Range    Glucose (POC) 66 65 - 117 mg/dL    Performed by [address] [place] [address], POC    Collection Time: 05/19/22  5:51 AM   Result Value Ref Range    Glucose (POC) 123 (H) 65 - 117 mg/dL    Performed by [facility]    TROPONIN-HIGH SENSITIVITY    Collection Time: 05/19/22  9:12 AM   Result Value Ref Range    Troponin-High Sensitivity 800 (HH) 0 - 51 ng/L   GLUCOSE, POC    Collection Time: 05/19/22  9:42 AM   Result Value Ref Range    Glucose (POC) 88 65 - 117 mg/dL    Performed by [facility]        Results     Procedure Component Value Units Date/Time    CULTURE, RESPIRATORY/SPUTUM/BRONCH Jonny Santos [[id number]]     Order Status: Sent Specimen: Sputum     CULTURE, Russell Fee STAIN [[id number]]  (Susceptibility) Collected: 05/15/22 1915    Order Status: Completed Specimen: Wound from Great Toe Updated: 05/18/22 2790     Special Requests: No Special Requests        GRAM STAIN       3+ Gram Positive Rods (Coryneform)                  1+ apparent Gram Negative Rods           Culture result:       Heavy Acinetobacter baumannii complex            Heavy Diphtheroids       Susceptibility      Acinetobacter baumannii complex     GEMA     Ampicillin/sulbactam ($) Susceptible     Cefepime ($$) Susceptible     Ceftazidime ($) Susceptible     Ceftriaxone ($) Intermediate     Ciprofloxacin ($) Resistant     Gentamicin ($) Susceptible     Levofloxacin ($) Resistant     Meropenem ($$) Resistant     Piperacillin/Tazobac ($) Resistant     Tobramycin ($) Susceptible     Trimeth/Sulfa Susceptible                  Linear View                   CULTURE, Alessandra Aviles STAIN [[id number]]  (Susceptibility) Collected: 05/14/22 1859    Order Status: Completed Specimen: Wound from Ulcer Updated: 05/18/22 0841     Special Requests: No Special Requests        GRAM STAIN Few WBCs seen         2+ Gram Negative Rods               Occasional Gram Positive Cocci in pairs            Rare Gram Positive Rods        Culture result: Moderate Acinetobacter baumannii complex                  Moderate Enterococcus faecium                  Light >2 organisms - contaminated specimen. suggest recollection Anaerobic gram negative rods Beta Lactamase Positive          Susceptibility      Acinetobacter baumannii complex     GEMA     Ampicillin/sulbactam ($) Susceptible     Cefepime ($$) Susceptible     Ceftazidime ($) Susceptible     Ceftriaxone ($) Intermediate     Ciprofloxacin ($) Resistant     Gentamicin ($) Susceptible     Levofloxacin ($) Resistant     Meropenem ($$) Resistant     Piperacillin/Tazobac ($) Resistant     Tobramycin ($) Susceptible     Trimeth/Sulfa Susceptible                  Linear View               Susceptibility      Enterococcus faecium     GEMA     Ampicillin ($) Resistant     Daptomycin ($$$$$)  See below  [1]      Linezolid ($$$$$) Susceptible     Vancomycin ($) Susceptible                 [1]  Dose Dependent  (SENSITIVITIES PERFORMED BY E-TEST)          Linear View                   CULTURE, BLOOD, PAIRED [[id number]]  (Abnormal) Collected: 05/14/22 2000    Order Status: Completed Specimen: Blood Updated: 05/17/22 1212     Special Requests: No Special Requests        Culture result:       Staphylococcus species, coagulase negative GROWING IN THE ANAEROBIC BOTTLE.  No Site Indicated            (NOTE) PRELIMINARY REPORT OF GRAM POSITIVE COCCI IN CLUSTERS GROWING IN THE ANAEROBIC BOTTLE, CALLED TO AND READ BACK BY CANDI KNOWLES 5/16/22 1118 [facility]. Labs:     Recent Labs     05/19/22  0415 05/18/22  1600   WBC 13.1* 13.9*   HGB 7.8* 8.1*   HCT 24.0* 25.3*    368     Recent Labs     05/19/22  0525 05/19/22  0400 05/18/22  1600 05/18/22  0714 05/18/22  0714 05/17/22  0604 05/17/22  0604   * 147* 144   < > 144   < > 143   K 4.0 4.0 5.0   < > 5.6*   < > 5.3*   * 118* 114*   < > 113*   < > 113*   CO2 19* 21 23   < > 22   < > 22   BUN 67* 65* 68*   < > 66*   < > 48*   CREA 1.70* 1.67* 1.76*   < > 1.55*   < > 1.21*   GLU 67 68 174*   < > 289*   < > 301*   CA 8.8 9.2 8.9   < > 8.3*   < > 8.7   MG  --   --   --   --  2.9*  --  2.6*   PHOS  --   --   --   --  3.9  --  3.9    < > = values in this interval not displayed. Recent Labs     05/19/22  0525 05/19/22  0415 05/19/22  0400   ALT 1,551* 1,461* 1,467*   AP 91 88 89   TBILI 0.5 0.5 0.5   TP 6.6 6.8 6.6   ALB 1.6* 1.6* 1.6*   GLOB 5.0* 5.2* 5.0*     Recent Labs     05/19/22  0525 05/18/22  1600 05/18/22  1115   INR 1.9* 1.8* 2.0*   PTP 21.5* 21.1* 22.6*      No results for input(s): FE, TIBC, PSAT, FERR in the last 72 hours. No results found for: FOL, RBCF   Recent Labs     05/19/22  0400 05/18/22  1730   PH 7.51* 7.52*   PCO2 26* 25*   PO2 68* 154*     No results for input(s): CPK, CKNDX, TROIQ in the last 72 hours.     No lab exists for component: CPKMB  Lab Results   Component Value Date/Time    Cholesterol, total 124 03/08/2022 07:40 AM    HDL Cholesterol 30 03/08/2022 07:40 AM    LDL,Direct 79 06/28/2021 12:00 AM    LDL, calculated 44.8 03/08/2022 07:40 AM    Triglyceride 202 (H) 05/16/2022 06:20 AM    CHOL/HDL Ratio 4.1 03/08/2022 07:40 AM     Lab Results   Component Value Date/Time    Glucose (POC) 88 05/19/2022 09:42 AM    Glucose (POC) 123 (H) 05/19/2022 05:51 AM    Glucose (POC) 66 05/19/2022 05:29 AM    Glucose (POC) 99 05/19/2022 12:04 AM    Glucose (POC) 94 05/18/2022 11:50 PM     Lab Results   Component Value Date/Time    Color Yellow/Straw 05/14/2022 08:08 PM    Appearance Clear 05/14/2022 08:08 PM    Specific gravity 1.014 05/14/2022 08:08 PM    pH (UA) 6.0 05/14/2022 08:08 PM    Protein 30 (A) 05/14/2022 08:08 PM    Glucose 50 (A) 05/14/2022 08:08 PM    Ketone Negative 05/14/2022 08:08 PM    Bilirubin Negative 05/14/2022 08:08 PM    Urobilinogen 0.1 05/14/2022 08:08 PM    Nitrites Negative 05/14/2022 08:08 PM    Leukocyte Esterase Trace (A) 05/14/2022 08:08 PM    Bacteria Negative 05/14/2022 08:08 PM    Bacteria Rare (A) 05/14/2022 08:08 PM    WBC 0-4 05/14/2022 08:08 PM    WBC 4 05/14/2022 08:08 PM    RBC 0-5 05/14/2022 08:08 PM    RBC 1 05/14/2022 08:08 PM         Assessment:   Acute respiratory failure on ventilator 40% O2  Sacral decubitus ulcer with recent culture growing Pseudomonas rx with  Zosyn and meropenem  Sepsis with leukocytosis elevated procalcitonin and CRP  Left foot wound  Recent removal of left great toe and second toe  CVA with PEG tube placement  History of aspiration pneumonia  History of chronic kidney disease  Hypertension  Hyperlipidemia  Anemia of chronic disease  Hyperkalemia  Static post transfusion  Uncontrolled diabetes  Hepatic shock  Coagulopathy      Plan:     Continue amlodipine 5 mg daily  Aspirin 81 mg daily  Lipitor 20 mg daily discontinue   IV vancomycin and IV Unasyn  Cholestyramine 4 g twice a day  SCD for DVT prophylaxis  Neurontin 200 mg twice a day  Hydralazine 50 mg 3 times a day  Lantus 50 units subcu daily    Add Lantus 10 units in the evening    GI consult for hepatic shock  Hematology consult for coagulopathy  Patient on vancomycin and Fortaz as per ID    Monitor liver function      Vascular surgery planned for laparoscopic loop colostomy placement and sacral debridement then wound vacuum  Also try to close the wound on the left foot with TMA      Current Facility-Administered Medications:     dextrose 10% infusion 0-250 mL, 0-250 mL, IntraVENous, PRN, Mohiuddin, Estanislado Pallas, MD, 125 mL at 05/19/22 0537    cefTAZidime (FORTAZ) 1 g in 0.9% sodium chloride (MBP/ADV) 50 mL MBP, 1 g, IntraVENous, Q12H, Shy Velasco MD    0.9% sodium chloride infusion 250 mL, 250 mL, IntraVENous, PRN, Concetta Bob MD    insulin glargine (LANTUS) injection 10 Units, 10 Units, SubCUTAneous, QHS, Israel Soto MD    0.9% sodium chloride infusion 250 mL, 250 mL, IntraVENous, PRN, Caity Luther MD    0.9% sodium chloride infusion 250 mL, 250 mL, IntraVENous, PRN, Concetta Bob MD    NOREPINephrine (LEVOPHED) 32,000 mcg in dextrose 5% 250 mL (128 mcg/mL) infusion, 2 mcg/min, IntraVENous, TITRATE, Rachel Fields MD    0.9% sodium chloride infusion 250 mL, 250 mL, IntraVENous, PRN, Concetta Bob MD    propofol (DIPRIVAN) 10 mg/mL infusion, 0-50 mcg/kg/min, IntraVENous, TITRATE, Caity Luther MD, Last Rate: 9.5 mL/hr at 05/19/22 0643, 20 mcg/kg/min at 05/19/22 0643    sodium zirconium cyclosilicate (LOKELMA) powder packet 10 g, 10 g, Oral, DAILY, Bryanna Louise MD    Vancomycin - reminder to draw level 5/19 @ 1800 please, , Other, ONCE, Shy Velasco MD    0.9% sodium chloride infusion 250 mL, 250 mL, IntraVENous, PRN, Jonah Soto MD    sodium hypochlorite (HALF STRENGTH DAKIN'S) 0.25% irrigation (bottle), , Topical, BID, Israel Soto MD, Given at 05/18/22 0857    glucose chewable tablet 16 g, 4 Tablet, Oral, PRN, Israel Soto MD    glucagon (GLUCAGEN) injection 1 mg, 1 mg, IntraMUSCular, PRN, Jonah Soto MD    insulin lispro (HUMALOG) injection, , SubCUTAneous, Q6H, Israel Soto MD, 4 Units at 05/18/22 8942    Vancomycin Pharmacy Dosing, , Other, Rx Dosing/Monitoring, [place],     sodium chloride (NS) flush 5-40 mL, 5-40 mL, IntraVENous, Q8H, Orestes Norman MD, 10 mL at 05/19/22 0551    acetaminophen (TYLENOL) tablet 650 mg, 650 mg, Oral, Q6H PRN, 650 mg at 05/18/22 0056 **OR** acetaminophen (TYLENOL) suppository 650 mg, 650 mg, Rectal, Q6H PRN, Fiorella JACKSON MD, 650 mg at 05/16/22 2223    polyethylene glycol (MIRALAX) packet 17 g, 17 g, Oral, DAILY PRN, Fiorella JACKSON MD    ondansetron (ZOFRAN ODT) tablet 4 mg, 4 mg, Oral, Q8H PRN **OR** ondansetron (ZOFRAN) injection 4 mg, 4 mg, IntraVENous, Q6H PRN, Orestes Norman MD    enoxaparin (LOVENOX) injection 40 mg, 40 mg, SubCUTAneous, DAILY, Orestes Norman MD, 40 mg at 05/17/22 1006    famotidine (PEPCID) tablet 20 mg, 20 mg, Oral, BID, Orestes Norman MD, 20 mg at 05/19/22 0929    acidophilus-pectin, citrus tablet 2 Tablet, 2 Tablet, Oral, DAILY, Fiorella JACKSON MD, 2 Tablet at 05/19/22 0929    albuterol-ipratropium (DUO-NEB) 2.5 MG-0.5 MG/3 ML, 3 mL, Nebulization, Q6H PRN, Orestes Norman MD    amLODIPine (NORVASC) tablet 5 mg, 5 mg, Oral, DAILY, Orestes Norman MD, 5 mg at 05/19/22 0929    artificial saliva (MOUTH KOTE) 1 Spray, 1 Spray, Oral, TID, Fiorella JACKSON MD, 1 [place] at 05/18/22 0857    aspirin delayed-release tablet 81 mg, 81 mg, Oral, DAILY, Orestes Norman MD, 81 mg at 05/18/22 0854    brimonidine (ALPHAGAN) 0.2 % ophthalmic solution 1 Drop, 1 Drop, Both Eyes, TID, Orestes Norman MD, 1 Drop at 05/19/22 0930    cholestyramine-aspartame (QUESTRAN LIGHT) packet 4 g, 4 g, Oral, BID WITH MEALS, Orestes Norman MD, 4 g at 05/19/22 0929    fenofibrate nanocrystallized (TRICOR) tablet 48 mg, 48 mg, Oral, DAILY, Orestes Norman MD, 48 mg at 05/19/22 1188    ferrous sulfate tablet 325 mg, 325 mg, Oral, BID, Orestes Norman MD, 325 mg at 05/19/22 0929    gabapentin (NEURONTIN) capsule 300 mg, 300 mg, Oral, BID, Orestes Norman MD, 300 mg at 05/19/22 0929    hydrALAZINE (APRESOLINE) tablet 50 mg, 50 mg, Oral, TID, Orestes Norman MD, 50 mg at 05/18/22 0854    insulin glargine (LANTUS) injection 50 Units, 50 Units, SubCUTAneous, DAILY, Fiorella JACKSON MD, 25 Units at 05/18/22 0907    latanoprost (XALATAN) 0.005 % ophthalmic solution 1 Drop, 1 Drop, Right Eye, QPM, Orestes Norman MD, 1 Drop at 05/17/22 1800    metoprolol tartrate (LOPRESSOR) tablet 50 mg, 50 mg, Oral, BID, Orestes Norman MD, 50 mg at 05/18/22 0855    traMADoL (ULTRAM) tablet 25 mg, 25 mg, Oral, Q12H PRN, Jony Chappell MD, 25 mg at 05/18/22 0306

## 2022-05-19 NOTE — CONSULTS
Hematology/Oncology Consult    Patient: Diego Sanders MRN: [id number]     YOB: 1945  Age: 78 y.o. Sex: female      HPI      Chief Complaint   Patient presents with    Leg Pain    Wound Check       Diego Sanders is a 78 y.o. female who is being seen for coagulopathy. Ms. Cynthia Estrada is a 16-year-old female with a history of stroke and apparently was bedbound and has problems with peripheral vascular disease diabetes hypertension chronic kidney disease hyperlipidemia and neuropathy problems. She is admitted on the 14th with worsening wound infection and decubitus ulcers. Patient was diagnosed with sepsis from the wound infection and is on antibiotics. Patient was evaluated by surgery and was planned to have a sacral wound debridement and a laparoscopic loop colostomy to help with the decubitus ulcers. However in the OR patient was noted to be hypotensive hypoxic and was intubated started on Levophed and transferred to ICU yesterday. On labs she is noted to have acute elevation of transaminases with ALT of 1493 and AST more than 2000. Her troponin was elevated at 890 along with a BNP of 23,000. She is also noted to have coagulopathy on labs so hematology consult is requested. Hepatitis panel testing is positive for hep C. Patient is now intubated in ICU and sedated. Discussed with RN no active bleeding is noted anywhere at this time. Bell catheter has clear urine and the rectal bag has no blood in stool. All the IV lines and the groin central line look clear.     Past Medical History:   Diagnosis Date    Anemia     Chronic kidney disease     Diabetes mellitus ([facility] [address]     Hemiplegia affecting dominant side, post-stroke ([facility] [address] 05/04/2022    HTN (hypertension)     Hyperkalemia     Hyperlipidemia     Ill-defined condition     Neuropathy     PAD (peripheral artery disease) (HCC)     PCI    Sciatica     Stroke [facility])     UTI (urinary tract infection)      Past Surgical History:   Procedure Laterality Date    HX AMPUTATION Right     great toe    HX CERVICAL FUSION      HX GI      HX OTHER SURGICAL Bilateral     Stent placement    HX OTHER SURGICAL      HX VASCULAR STENT Bilateral     2 in right 1 in left    HX VASCULAR STENT Bilateral       Family History   Problem Relation Age of Onset    Cancer Mother     Diabetes Mother     Hypertension Mother      Social History     Tobacco Use    Smoking status: Never Smoker    Smokeless tobacco: Never Used   Substance Use Topics    Alcohol use: Not Currently      Current Facility-Administered Medications   Medication Dose Route Frequency Provider Last Rate Last Admin    dextrose 10% infusion 0-250 mL  0-250 mL IntraVENous PRN Diana Barahona MD   125 mL at 05/19/22 0537    cefTAZidime (FORTAZ) 1 g in 0.9% sodium chloride (MBP/ADV) 50 mL MBP  1 g IntraVENous Q12H Rich Ramsey MD   1 g at 05/19/22 1100    0.9% sodium chloride infusion 250 mL  250 mL IntraVENous PRN Moshe Bob MD        insulin glargine (LANTUS) injection 10 Units  10 Units SubCUTAneous QHS Israel Soto MD        0.9% sodium chloride infusion 250 mL  250 mL IntraVENous PRN Katrina Fields MD        0.9% sodium chloride infusion 250 mL  250 mL IntraVENous PRN Moshe Bob MD        NOREPINephrine (LEVOPHED) 32,000 mcg in dextrose 5% 250 mL (128 mcg/mL) infusion  2 mcg/min IntraVENous TITRATE Katrina Fields MD        0.9% sodium chloride infusion 250 mL  250 mL IntraVENous PRN Moshe Bob MD        propofol (DIPRIVAN) 10 mg/mL infusion  0-50 mcg/kg/min IntraVENous TITRATE Katrina Fields MD 9.5 mL/hr at 05/19/22 1321 20 mcg/kg/min at 05/19/22 1321    sodium zirconium cyclosilicate (LOKELMA) powder packet 10 g  10 g Oral DAILY Libia Louise MD        Vancomycin - reminder to draw level 5/19 @ 1800 please   Other Flavio Andrea MD        0.9% sodium chloride infusion 250 mL  250 mL IntraVENous PRN Brittany Yun Guardado MD        sodium hypochlorite (HALF STRENGTH DAKIN'S) 0.25% irrigation (bottle)   Topical BID Moon Cordoba MD   Given at 05/19/22 1321    glucose chewable tablet 16 g  4 Tablet Oral PRN Yun Soto MD        glucagon (GLUCAGEN) injection 1 mg  1 mg IntraMUSCular PRN Yun Soto MD Florina Smiles insulin lispro (HUMALOG) injection   SubCUTAneous Q6H Yun Soto MD   4 Units at 05/18/22 1846    Vancomycin Pharmacy Dosing   Other Rx Dosing/Monitoring Noé VILLASEÑOR,         sodium chloride (NS) flush 5-40 mL  5-40 mL IntraVENous Q8H Eder JACKSON MD   10 mL at 05/19/22 1440    acetaminophen (TYLENOL) tablet 650 mg  650 mg Oral Q6H PRN Eder JACKSON MD   650 mg at 05/18/22 0056    Or    acetaminophen (TYLENOL) suppository 650 mg  650 mg Rectal Q6H PRN Eder JACKSON MD   650 mg at 05/16/22 2223    polyethylene glycol (MIRALAX) packet 17 g  17 g Oral DAILY PRN Eder JACKSON MD        ondansetron (ZOFRAN ODT) tablet 4 mg  4 mg Oral Q8H PRN Eder JACKSON MD        Or    ondansetron (ZOFRAN) injection 4 mg  4 mg IntraVENous Q6H PRN Rachel Good MD        [Held by provider] enoxaparin (LOVENOX) injection 40 mg  40 mg SubCUTAneous DAILY Eder JACKSON MD   40 mg at 05/17/22 1006    famotidine (PEPCID) tablet 20 mg  20 mg Oral BID Eder JACKSON MD   20 mg at 05/19/22 8713    acidophilus-pectin, citrus tablet 2 Tablet  2 Tablet Oral DAILY Rachel Good MD   2 Tablet at 05/19/22 0929    albuterol-ipratropium (DUO-NEB) 2.5 MG-0.5 MG/3 ML  3 mL Nebulization Q6H PRN Eder JACKSON MD        amLODIPine (NORVASC) tablet 5 mg  5 mg Oral DAILY Eder JACKSON MD   5 mg at 05/19/22 0929    artificial saliva (MOUTH KOTE) 1 Spray  1 Spray Oral TID Eder JACKSON MD   1 [place] at 05/18/22 0857    aspirin delayed-release tablet 81 mg  81 mg Oral DAILY Eder JACKSON MD   81 mg at 05/18/22 0854    brimonidine (ALPHAGAN) 0.2 % ophthalmic solution 1 Drop 1 Drop Both Eyes TID Jony Chappell MD   1 Drop at 05/19/22 0930    cholestyramine-aspartame (QUESTRAN LIGHT) packet 4 g  4 g Oral BID WITH MEALS Lowry Hodgkins I, MD   4 g at 05/19/22 0929    [Held by provider] fenofibrate nanocrystallized (TRICOR) tablet 48 mg  48 mg Oral DAILY Lowry Hodgkins I, MD   48 mg at 05/19/22 6097    ferrous sulfate tablet 325 mg  325 mg Oral BID Lowry Hodgkins I, MD   325 mg at 05/19/22 1394    gabapentin (NEURONTIN) capsule 300 mg  300 mg Oral BID Lowry Hodgkins I, MD   300 mg at 05/19/22 0929    hydrALAZINE (APRESOLINE) tablet 50 mg  50 mg Oral TID Lowry Hodgkins I, MD   50 mg at 05/18/22 0854    insulin glargine (LANTUS) injection 50 Units  50 Units SubCUTAneous DAILY Jony Chappell MD   25 Units at 05/18/22 0907    latanoprost (XALATAN) 0.005 % ophthalmic solution 1 Drop  1 Drop Right Eye QPM Jony Chappell MD   1 Drop at 05/17/22 1800    metoprolol tartrate (LOPRESSOR) tablet 50 mg  50 mg Oral BID Lowry Hodgkins I, MD   50 mg at 05/18/22 0855    traMADoL (ULTRAM) tablet 25 mg  25 mg Oral Q12H PRN Jony Chappell MD   25 mg at 05/18/22 0056        No Known Allergies    Review of Systems:  Review of systems not obtainable as she is intubated and sedated    Objective:     Vitals:    05/19/22 1000 05/19/22 1100 05/19/22 1141 05/19/22 1200   BP: (!) 116/59 115/60  (!) 116/58   Pulse: 62 62  62   Resp: 12 12  12   Temp:  97.4 °F (36.3 °C)     SpO2: 99% 98%  99%   Weight:       Height:   5' 7.01\" (1.702 m)         Physical Exam:  Constitutional  -American female, chronically ill-appearing now intubated and sedated. Rajat Awkward Head Normocephalic; no scars   Eyes Conjunctivae and sclerae are clear and without icterus. Pupils are reactive and equal.   ENMT Sinuses are nontender. No oral exudates, ulcers, masses, thrush or mucositis. Oropharynx clear. Tongue normal.   Neck Supple without masses or thyromegaly. No jugular venous distension.    Hematologic/Lymphatic No petechiae or purpura. No tender or palpable lymph nodes in the cervical, supraclavicular, axillary or inguinal area. Respiratory Lungs are clear to auscultation without rhonchi or wheezing. Cardiovascular Regular rate and rhythm of heart without murmurs, gallops or rubs. Chest / Line Site Chest is symmetric with no chest wall deformities. Abdomen Non-tender, non-distended, PEG tube is present, no tenderness  or rebound tenderness. No pulsatile masses. Musculoskeletal No tenderness or swelling, normal range of motion without obvious weakness. 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 Neurologic  is nonresponsive at this time on vent support   Psychiatric      Lab/Data Review:  Recent Labs     05/19/22  0415 05/18/22  1600 05/18/22  0714   WBC 13.1* 13.9* 17.2*   HGB 7.8* 8.1* 7.6*   HCT 24.0* 25.3* 24.4*    368 448*     Recent Labs     05/19/22  1257 05/19/22  0525 05/19/22  0415 05/19/22  0400 05/18/22  1600 05/18/22  1600 05/18/22  1115 05/18/22  0714 05/17/22  0604 05/17/22  0604   NA  --  146*  --  147*  --  144  --  144   < > 143   K  --  4.0  --  4.0  --  5.0  --  5.6*   < > 5.3*   CL  --  117*  --  118*  --  114*  --  113*   < > 113*   CO2  --  19*  --  21  --  23  --  22   < > 22   GLU  --  67  --  68  --  174*  --  289*   < > 301*   BUN  --  67*  --  65*  --  68*  --  66*   < > 48*   CREA  --  1.70*  --  1.67*  --  1.76*  --  1.55*   < > 1.21*   CA  --  8.8  --  9.2  --  8.9  --  8.3*   < > 8.7   MG  --   --   --   --   --   --   --  2.9*  --  2.6*   PHOS  --   --   --   --   --   --   --  3.9  --  3.9   ALB  --  1.6* 1.6* 1.6*   < > 1.7*  --  1.6*   < > 1.6*   TBILI  --  0.5 0.5 0.5   < > 0.6  --  0.3   < > 0.3   ALT  --  1,551* 1,461* 1,467*   < > 1,493*  --  1,492*   < > 25   INR 1.8* 1.9*  --   --   --  1.8*   < >  --   --   --     < > = values in this interval not displayed.      Recent Labs 05/19/22  0400 05/18/22  1730 05/18/22  1100   PH 7.51* 7.52* 7.43   PCO2 26* 25* 35   PO2 68* 154* 118*   HCO3 23 23 24   FIO2 30.0 60.0 100     Recent Results (from the past 24 hour(s))   VANCOMYCIN, TROUGH    Collection Time: 05/18/22  4:00 PM   Result Value Ref Range    Vancomycin,trough 28.2 (HH) 5.0 - 10.0 ug/mL   CBC WITH AUTOMATED DIFF    Collection Time: 05/18/22  4:00 PM   Result Value Ref Range    WBC 13.9 (H) 3.6 - 11.0 K/uL    RBC 2.87 (L) 3.80 - 5.20 M/uL    HGB 8.1 (L) 11.5 - 16.0 g/dL    HCT 25.3 (L) 35.0 - 47.0 %    MCV 88.2 80.0 - 99.0 FL    MCH 28.2 26.0 - 34.0 PG    MCHC 32.0 30.0 - 36.5 g/dL    RDW 16.6 (H) 11.5 - 14.5 %    PLATELET 578 782 - 409 K/uL    MPV 10.7 8.9 - 12.9 FL    NRBC 4.0 (H) 0.0  WBC    ABSOLUTE NRBC 0.59 (H) 0.00 - 0.01 K/uL    NEUTROPHILS 79 (H) 32 - 75 %    LYMPHOCYTES 19 12 - 49 %    MONOCYTES 2 (L) 5 - 13 %    EOSINOPHILS 0 0 - 7 %    BASOPHILS 0 0 - 1 %    NRBC 7.0  WBC    IMMATURE GRANULOCYTES 0 %    ABS. NEUTROPHILS 11.0 (H) 1.8 - 8.0 K/UL    ABS. LYMPHOCYTES 2.6 0.8 - 3.5 K/UL    ABS. MONOCYTES 0.3 0.0 - 1.0 K/UL    ABS. EOSINOPHILS 0.0 0.0 - 0.4 K/UL    ABS. BASOPHILS 0.0 0.0 - 0.1 K/UL    ABSOLUTE NRBC 0.97 K/uL    ABS. IMM. GRANS. 0.0 K/UL    DF Manual      RBC COMMENTS Hypochromia  1+       METABOLIC PANEL, COMPREHENSIVE    Collection Time: 05/18/22  4:00 PM   Result Value Ref Range    Sodium 144 136 - 145 mmol/L    Potassium 5.0 3.5 - 5.1 mmol/L    Chloride 114 (H) 97 - 108 mmol/L    CO2 23 21 - 32 mmol/L    Anion gap 7 5 - 15 mmol/L    Glucose 174 (H) 65 - 100 mg/dL    BUN 68 (H) 6 - 20 mg/dL    Creatinine 1.76 (H) 0.55 - 1.02 mg/dL    BUN/Creatinine ratio 39 (H) 12 - 20      GFR est AA 34 (L) >60 ml/min/1.73m2    GFR est non-AA 28 (L) >60 ml/min/1.73m2    Calcium 8.9 8.5 - 10.1 mg/dL    Bilirubin, total 0.6 0.2 - 1.0 mg/dL    AST (SGOT) >2,000 (H) 15 - 37 U/L    ALT (SGPT) 1,493 (H) 12 - 78 U/L    Alk.  phosphatase 91 45 - 117 U/L    Protein, total 6.0 (L) 6.4 - 8.2 g/dL    Albumin 1.7 (L) 3.5 - 5.0 g/dL    Globulin 4.3 (H) 2.0 - 4.0 g/dL    A-G Ratio 0.4 (L) 1.1 - 2.2     PROTHROMBIN TIME + INR    Collection Time: 05/18/22  4:00 PM   Result Value Ref Range    Prothrombin time 21.1 (H) 11.9 - 14.6 sec    INR 1.8 (H) 0.9 - 1.1     ECHO ADULT COMPLETE    Collection Time: 05/18/22  5:12 PM   Result Value Ref Range    LV EDV A4C 66 mL    LV ESV A4C 61 mL    IVSd 1.3 (A) 0.6 - 0.9 cm    LVIDd 4.6 3.9 - 5.3 cm    LVIDs 4.0 cm    LVOT Mean Gradient 1 mmHg    LVOT VTI 10.6 cm    LVOT Peak Velocity 0.5 m/s    LVOT Peak Gradient 1 mmHg    LVPWd 1.3 (A) 0.6 - 0.9 cm    LV E' Lateral Velocity 5 cm/s    LV E' Septal Velocity 8 cm/s    LV Ejection Fraction A4C 8 %    LA Major [place] 5.0 cm    LA Area 4C 14.3 cm2    LA Diameter 3.5 cm    AV Mean Gradient 4 mmHg    AV VTI 24.5 cm    AV Mean Velocity 0.9 m/s    AV Peak Velocity 1.3 m/s    AV Peak Gradient 6 mmHg    MR .0 cm    MV Mean Gradient 2 mmHg    MV VTI 53.3 cm    MV Mean Velocity 0.6 m/s    MR Peak Velocity 4.0 m/s    MR Peak Gradient 64 mmHg    MV Max Velocity 1.4 m/s    MV Peak Gradient 8 mmHg    MV A Velocity 0.94 m/s    MV E Velocity 1.37 m/s    AR Max Velocity 1.0 m/s    Pulmonary Artery EDP 4 mmHg    PV Mean Gradient 1 mmHg    PV VTI 14.3 cm    PV Mean Velocity 0.5 m/s    PV Max Velocity 0.8 m/s    PV Peak Gradient 3 mmHg    TR Max Velocity 2.82 m/s    TR Peak Gradient 32 mmHg    TAPSE 1.4 (A) 1.7 cm    Fractional Shortening 2D 13 28 - 44 %    LV ESV Index A4C 32 mL/m2    LV EDV Index A4C 35 mL/m2    LVIDd Index 2.41 cm/m2    LVIDs Index 2.09 cm/m2    LV RWT Ratio 0.57     LV Mass 2D 230.2 (A) 67 - 162 g    LV Mass 2D Index 120.5 (A) 43 - 95 g/m2    MV E/A 1.46     E/E' Ratio (Averaged) 22.26     E/E' Lateral 27.40     E/E' Septal 17.13     LA Size Index 1.83 cm/m2    AV Velocity Ratio 0.38     LVOT:AV VTI Index 0.43     MV:LVOT VTI Index 5.03     RV Basal Dimension 2.6 cm    RV Mid Dimension 2.2 cm    RV Free Wall Peak S' 8 cm/s    Est. RA Pressure 3 mmHg    RVSP 35 mmHg   BLOOD GAS, ARTERIAL    Collection Time: 05/18/22  5:30 PM   Result Value Ref Range    pH 7.52 (H) 7.35 - 7.45      PCO2 25 (L) 35 - 45 mmHg    PO2 154 (H) 75 - 100 mmHg    O2  >95 %    BICARBONATE 23 22 - 26 mmol/L    BASE DEFICIT 1.5 0 - 2 mmol/L    O2 METHOD VENT      FIO2 60.0 %    MODE Assist Control/Volume Control      Tidal volume 550      SET RATE 12      EPAP/CPAP/PEEP 5.0      SITE Right Radial      EVELIN'S TEST PASS     GLUCOSE, POC    Collection Time: 05/18/22  5:37 PM   Result Value Ref Range    Glucose (POC) 146 (H) 65 - 117 mg/dL    Performed by Jennifer Castro    GLUCOSE, POC    Collection Time: 05/18/22 11:50 PM   Result Value Ref Range    Glucose (POC) 94 65 - 117 mg/dL    Performed by [facility] ERINN    GLUCOSE, POC    Collection Time: 05/19/22 12:04 AM   Result Value Ref Range    Glucose (POC) 99 65 - 117 mg/dL    Performed by [facility]    METABOLIC PANEL, COMPREHENSIVE    Collection Time: 05/19/22  4:00 AM   Result Value Ref Range    Sodium 147 (H) 136 - 145 mmol/L    Potassium 4.0 3.5 - 5.1 mmol/L    Chloride 118 (H) 97 - 108 mmol/L    CO2 21 21 - 32 mmol/L    Anion gap 8 5 - 15 mmol/L    Glucose 68 65 - 100 mg/dL    BUN 65 (H) 6 - 20 mg/dL    Creatinine 1.67 (H) 0.55 - 1.02 mg/dL    BUN/Creatinine ratio 39 (H) 12 - 20      GFR est AA 36 (L) >60 ml/min/1.73m2    GFR est non-AA 30 (L) >60 ml/min/1.73m2    Calcium 9.2 8.5 - 10.1 mg/dL    Bilirubin, total 0.5 0.2 - 1.0 mg/dL    AST (SGOT) 1,458 (H) 15 - 37 U/L    ALT (SGPT) 1,467 (H) 12 - 78 U/L    Alk.  phosphatase 89 45 - 117 U/L    Protein, total 6.6 6.4 - 8.2 g/dL    Albumin 1.6 (L) 3.5 - 5.0 g/dL    Globulin 5.0 (H) 2.0 - 4.0 g/dL    A-G Ratio 0.3 (L) 1.1 - 2.2     BLOOD GAS, ARTERIAL    Collection Time: 05/19/22  4:00 AM   Result Value Ref Range    pH 7.51 (H) 7.35 - 7.45      PCO2 26 (L) 35 - 45 mmHg    PO2 68 (L) 75 - 100 mmHg    O2 SAT 96 >95 %    BICARBONATE 23 22 - 26 mmol/L    BASE DEFICIT 1.4 0 - 2 mmol/L    O2 METHOD VENT      FIO2 30.0 %    MODE Assist Control/Volume Control      Tidal volume 550      SET RATE 12      IPAP/PIP 0      EPAP/CPAP/PEEP 5.0      SITE Arterial Line      EVELIN'S TEST PASS     CBC WITH AUTOMATED DIFF    Collection Time: 05/19/22  4:15 AM   Result Value Ref Range    WBC 13.1 (H) 3.6 - 11.0 K/uL    RBC 2.76 (L) 3.80 - 5.20 M/uL    HGB 7.8 (L) 11.5 - 16.0 g/dL    HCT 24.0 (L) 35.0 - 47.0 %    MCV 87.0 80.0 - 99.0 FL    MCH 28.3 26.0 - 34.0 PG    MCHC 32.5 30.0 - 36.5 g/dL    RDW 16.9 (H) 11.5 - 14.5 %    PLATELET 768 032 - 315 K/uL    MPV 10.8 8.9 - 12.9 FL    NRBC 5.3 (H) 0.0  WBC    ABSOLUTE NRBC 0.69 (H) 0.00 - 0.01 K/uL    NEUTROPHILS 75 32 - 75 %    LYMPHOCYTES 15 12 - 49 %    MONOCYTES 5 5 - 13 %    EOSINOPHILS 2 0 - 7 %    BASOPHILS 1 0 - 1 %    IMMATURE GRANULOCYTES 2 (H) 0 - 0.5 %    ABS. NEUTROPHILS 9.9 (H) 1.8 - 8.0 K/UL    ABS. LYMPHOCYTES 1.9 0.8 - 3.5 K/UL    ABS. MONOCYTES 0.7 0.0 - 1.0 K/UL    ABS. EOSINOPHILS 0.3 0.0 - 0.4 K/UL    ABS. BASOPHILS 0.1 0.0 - 0.1 K/UL    ABS. IMM. GRANS. 0.2 (H) 0.00 - 0.04 K/UL    DF AUTOMATED     C REACTIVE PROTEIN, QT    Collection Time: 05/19/22  4:15 AM   Result Value Ref Range    C-Reactive protein 18.00 (H) 0.00 - 0.60 mg/dL   PROCALCITONIN    Collection Time: 05/19/22  4:15 AM   Result Value Ref Range    Procalcitonin 2.32 (H) 0 ng/mL   HEPATIC FUNCTION PANEL    Collection Time: 05/19/22  4:15 AM   Result Value Ref Range    Protein, total 6.8 6.4 - 8.2 g/dL    Albumin 1.6 (L) 3.5 - 5.0 g/dL    Globulin 5.2 (H) 2.0 - 4.0 g/dL    A-G Ratio 0.3 (L) 1.1 - 2.2      Bilirubin, total 0.5 0.2 - 1.0 mg/dL    Bilirubin, direct 0.2 0.0 - 0.2 mg/dL    Alk.  phosphatase 88 45 - 117 U/L    AST (SGOT) 1,463 (H) 15 - 37 U/L    ALT (SGPT) 1,461 (H) 12 - 78 U/L   HEPATITIS PANEL, ACUTE    Collection Time: 05/19/22  4:15 AM   Result Value Ref Range    Hepatitis A, IgM NONREACTIVE NONREACTIVE      __ Hepatitis B surface Ag <0.10 Index    Hep B surface Ag Interp. Negative Negative    __        Hepatitis B core, IgM NONREACTIVE NONREACTIVE    __        Hepatitis C virus Ab 6.93 Index    Hep C virus Ab Interp. Reactive (A) NONREACTIVE     METABOLIC PANEL, COMPREHENSIVE    Collection Time: 05/19/22  5:25 AM   Result Value Ref Range    Sodium 146 (H) 136 - 145 mmol/L    Potassium 4.0 3.5 - 5.1 mmol/L    Chloride 117 (H) 97 - 108 mmol/L    CO2 19 (L) 21 - 32 mmol/L    Anion gap 10 5 - 15 mmol/L    Glucose 67 65 - 100 mg/dL    BUN 67 (H) 6 - 20 mg/dL    Creatinine 1.70 (H) 0.55 - 1.02 mg/dL    BUN/Creatinine ratio 39 (H) 12 - 20      GFR est AA 36 (L) >60 ml/min/1.73m2    GFR est non-AA 29 (L) >60 ml/min/1.73m2    Calcium 8.8 8.5 - 10.1 mg/dL    Bilirubin, total 0.5 0.2 - 1.0 mg/dL    AST (SGOT) 1,420 (H) 15 - 37 U/L    ALT (SGPT) 1,551 (H) 12 - 78 U/L    Alk.  phosphatase 91 45 - 117 U/L    Protein, total 6.6 6.4 - 8.2 g/dL    Albumin 1.6 (L) 3.5 - 5.0 g/dL    Globulin 5.0 (H) 2.0 - 4.0 g/dL    A-G Ratio 0.3 (L) 1.1 - 2.2     PROTHROMBIN TIME + INR    Collection Time: 05/19/22  5:25 AM   Result Value Ref Range    Prothrombin time 21.5 (H) 11.9 - 14.6 sec    INR 1.9 (H) 0.9 - 1.1     GLUCOSE, POC    Collection Time: 05/19/22  5:29 AM   Result Value Ref Range    Glucose (POC) 66 65 - 117 mg/dL    Performed by [address] [place] [address], POC    Collection Time: 05/19/22  5:51 AM   Result Value Ref Range    Glucose (POC) 123 (H) 65 - 117 mg/dL    Performed by [facility]    TROPONIN-HIGH SENSITIVITY    Collection Time: 05/19/22  9:12 AM   Result Value Ref Range    Troponin-High Sensitivity 800 (HH) 0 - 51 ng/L   GLUCOSE, POC    Collection Time: 05/19/22  9:42 AM   Result Value Ref Range    Glucose (POC) 88 65 - 117 mg/dL    Performed by Saeed Carroll, POC    Collection Time: 05/19/22 11:40 AM   Result Value Ref Range    Glucose (POC) 91 65 - 117 mg/dL    Performed by Kimberly Carvajal    PROTHROMBIN TIME + INR    Collection Time: 05/19/22 12:57 PM   Result Value Ref Range    Prothrombin time 21.2 (H) 11.9 - 14.6 sec    INR 1.8 (H) 0.9 - 1.1          US ABD LTD    Result Date: 5/19/2022  1. Question pericholecystic fluid. No identified gallstones or sludge. 2. Right pleural effusion. 3. Increased right renal echotexture for medical renal disease. XR CHEST PORT    Result Date: 5/19/2022  Ill-defined haziness in the mid to lower lung zones suspect for mild atelectatic changes and/or interstitial fluid or pleural fluid. XR CHEST PORT    Result Date: 5/18/2022  Intubation. Findings suggesting hydrostatic edema. XR CHEST PORT    Result Date: 5/14/2022  No evidence of an acute cardiopulmonary process. Assessment and Plan:     Hospital Problems  Date Reviewed: 5/18/2022          Codes Class Noted POA    Sacral ulcer ([facility] [address] ICD-10-CM: E27.403  ICD-9-CM: 707.8  5/14/2022 Unknown              3  62-year-old admitted with severe decubitus ulcer related infection/sepsis. 2) patient is currently in shock secondary to sepsis/cardiogenic shock.  -She is intubated on pressor support. -Marked elevation in liver transaminases consistent with shock liver.  -Also has acute renal failure.  -Currently being treated for sepsis with antibiotics. Cardiology is also following the patient. Her EF is down to 20 to 25%    3) coagulopathy: Appears to be secondary to liver failure.  -Also need to rule out vitamin K deficiency.  -We will check labs to evaluate for DIC, appears less likely based on the clinical picture. Check fibrinogen PTT and D-dimer.  -Recommend to give vitamin K 10 mg daily for 3 days. -Give fresh frozen plasma for evidence of any bleeding or major drop in H&H.  -Her platelet count is normal now. 4) anemia: Acute on chronic anemia. Likely secondary to CKD and some bleeding from ongoing wound problems.  -We will check iron studies T88 and folic acid levels.   Transfuse for hemoglobin less than 7 g/dL    Thank you for the consult    Signed By: Yousuf Hagen MD     May 19, 2022

## 2022-05-19 NOTE — PROGRESS NOTES
Comprehensive Nutrition Assessment    Type and Reason for Visit: Reassess (Ventm goal)    Nutrition Recommendations/Plan:   1. Continue NPO  2. Adjust TF via PEG to Vital 1.2 at goal of 60 mL/hr. 3. Flush with 100 mL H2O Q4H        Provides 1728 kcal(101%), 108g PRO (97%), 1768 mL H2O (103%). Propofol at 20mcg/kg/min providing 251kcals/d (115%)    4. Please document TF rate, tolerance, BM in I/Os. Malnutrition Assessment:  Malnutrition Status:  Severe malnutrition (05/19/22 1143)    Context:  Chronic illness     Findings of the 6 clinical characteristics of malnutrition:   Energy Intake:  No significant decrease in energy intake (via TF)  Weight Loss:  Greater than 5% over 1 month (16# x1 month per EMR (8.3%, severe))     Body Fat Loss:  No significant body fat loss,     Muscle Mass Loss:  Severe muscle mass loss, Clavicles (pectoralis &deltoids),Thigh (quadriceps)  Fluid Accumulation:  No significant fluid accumulation,        Nutrition Assessment:    Admitted for sacral PI, (4/21) Debridment. Plan for repeat I&D, L TMA, loop colostomy however delayed d/t hypotension. No pressor started, MAP 65-70mmHg, pt now intubated in ICU. Pt on Glucerna pta and has tolerated well on same formula since admit. Concern from nephrology 2/2 elevated potassium, likely r/t declining kidney function, now normalized. Will monitor lytes for need for low-electrolyte TF formula. Regimen adjusted for ventilation. Labs: H/H 7.8/24.0, Na 146, BUN 67, Cr 1.70, BG , AST 1420, ALT 1551, Mg 2.9, Alb 1.6. Meds: Probiotic, Questran Light, pepcid, Tricor, FeSu, Insulin, Propofol at 20mcg/kg/min, Lokelma, tramadol. Nutrition Related Findings:    No N/V/D/C nor s/s of aspiration per Nsg. Remains NPO. +1-2 BLE edema. Last BM 5/19, loose, chronic diarrhea per daughter.  Wound Type: Surgical incision,Open wounds,Partial thickness (Partial Thickness- coccyx.)    Current Nutrition Intake & Therapies:  Average Meal Intake: NPO  Average Supplement Intake: NPO  DIET NPO  ADULT TUBE FEEDING PEG; Peptide Based; Delivery Method: Continuous; Continuous Initial Rate (mL/hr): 60; Continuous Advance Tube Feeding: No; Water Flush Volume (mL): 100; Water Flush Frequency: Q 4 hours  Additional Calorie Sources:  Propofol at 20mcg/kg/min providing 251kcals/d      Anthropometric Measures:  Height: 5' 7.01\" (170.2 cm)  Ideal Body Weight (IBW): 135 lbs (61 kg)  Admission Body Weight: 175 lb  Current Body Wt:  85.6 kg (188 lb 11.4 oz) (5/19), 139.8 % IBW. Bed scale  Current BMI (kg/m2): 29.5        Weight Adjustment:  (EDW 79.5kg; BMI 27.5)                 BMI Category: Overweight (BMI 25.0-29. 9)  Wt Readings from Last 10 Encounters:   05/19/22 85.6 kg (188 lb 11.4 oz)   04/10/22 86.6 kg (191 lb)   02/27/22 82.2 kg (181 lb 3.5 oz)   07/21/20 84.4 kg (186 lb)   02/17/20 84.8 kg (187 lb)   02/17/20 84.8 kg (187 lb)   10/25/16 81.2 kg (179 lb 1.6 oz)   07/18/16 79.8 kg (176 lb)   04/11/16 77.3 kg (170 lb 8 oz)   01/05/16 81.6 kg (180 lb)   wt loss of 16# x1 month per EMR (8.3%, severe)      Estimated Daily Nutrient Needs:  Energy Requirements Based On: Formula  Weight Used for Energy Requirements: Other (specify) (EDW)  Energy (kcal/day): 1715kcals (PSU 2003b +250 for wounds)  Weight Used for Protein Requirements: Other (specify) (EDW)  Protein (g/day): 111-127g (1.4-1.6g/kg)  Method Used for Fluid Requirements: 1 ml/kcal  Fluid (ml/day): 1715ml    Nutrition Diagnosis:   · Severe malnutrition,In context of chronic illness related to catabolic illness as evidenced by weight loss greater than or equal to 5% in 1 month,severe muscle loss      Nutrition Interventions:   Food and/or Nutrient Delivery: Continue NPO,Modify tube feeding  Nutrition Education/Counseling: No recommendations at this time  Coordination of Nutrition Care: Continue to monitor while inpatient  Plan of Care discussed with: RN    Goals:  Previous Goal Met: Progressing toward goal(s)  Goals: Meet at least 75% of estimated needs,Tolerate nutrition support at goal rate,other (specify)  Specify Other Goals: Maintain BGs <180 mg/dL.  Maintain CBW within +/- 0.5 kg    Nutrition Monitoring and Evaluation:   Behavioral-Environmental Outcomes: None identified  Food/Nutrient Intake Outcomes: Enteral nutrition intake/tolerance  Physical Signs/Symptoms Outcomes: Meal time behavior,Weight,Hemodynamic status,Diarrhea,Fluid status or edema,Biochemical data,Skin    Discharge Planning:    Enteral nutrition    Brenda Baez  Contact: Ext [phone], or via [employer]

## 2022-05-19 NOTE — PROGRESS NOTES
Progress Note  Date:2022       Room:[facility]  Patient Mark Washington     YOB: 1945     Age:77 y.o. Subjective    Subjective:  Symptoms:  Stable. blood pressure trend remains stable  Urine output of 740 mL over 24 hours  Being continued on amlodipine 5 mg, hydralazine 50 mg 3 times daily  On Lokelma 10 g  daily -did not receive today given normal K  Patient is on vancomycin, Unasyn,  Patient is on Levophed  Serum creatinine trend remains elevated but stable  Most recent pH 7.51/PCO2 26/PO2 68/HCO3 23    Initially admitted on . Patient is a nursing home resident and was sent for leg pain and is admitted and managed for worsening sacral ulcer. Review of Systems   Unable to perform ROS: Acuity of condition     Objective         Vitals Last 24 Hours:  TEMPERATURE:  Temp  Av.9 °F (36.6 °C)  Min: 97 °F (36.1 °C)  Max: 98.7 °F (37.1 °C)  RESPIRATIONS RANGE: Resp  Av.3  Min: 12  Max: 17  PULSE OXIMETRY RANGE: SpO2  Av.9 %  Min: 97 %  Max: 100 %  PULSE RANGE: Pulse  Av.2  Min: 60  Max: 94  BLOOD PRESSURE RANGE: Systolic (72ZEW), DNS:948 , Min:98 , QSY:502   ; Diastolic (54CCZ), YSL:59, Min:40, Max:67    I/O (24Hr): Intake/Output Summary (Last 24 hours) at 2022 1145  Last data filed at 2022 0930  Gross per 24 hour   Intake 2807.35 ml   Output 1190 ml   Net 1617.35 ml     Objective:  General Appearance:  Comfortable. Vital signs: (most recent): Blood pressure (!) 119/58, pulse 66, temperature 97.4 °F (36.3 °C), resp. rate 12, height 5' 7.01\" (1.702 m), weight 85.6 kg (188 lb 11.4 oz), SpO2 100 %, not currently breastfeeding.     Intubated, sedated  No significant subcutaneous edema noted  Producing urine  Response minimally and on minimal sedation    Labs/Imaging/Diagnostics    Labs:  CBC:  Recent Labs     22  0415 22  1600 22  0714   WBC 13.1* 13.9* 17.2*   RBC 2.76* 2.87* 2.82*   HGB 7.8* 8.1* 7.6* HCT 24.0* 25.3* 24.4*   MCV 87.0 88.2 86.5   RDW 16.9* 16.6* 17.6*    368 448*     CHEMISTRIES:  Recent Labs     05/19/22  0525 05/19/22  0400 05/18/22  1600 05/18/22  0714 05/18/22  0714 05/17/22  0604 05/17/22  0604   * 147* 144   < > 144   < > 143   K 4.0 4.0 5.0   < > 5.6*   < > 5.3*   * 118* 114*   < > 113*   < > 113*   CO2 19* 21 23   < > 22   < > 22   BUN 67* 65* 68*   < > 66*   < > 48*   CA 8.8 9.2 8.9   < > 8.3*   < > 8.7   PHOS  --   --   --   --  3.9  --  3.9   MG  --   --   --   --  2.9*  --  2.6*    < > = values in this interval not displayed. PT/INR:  Recent Labs     05/19/22  0525 05/18/22  1600 05/18/22  1115   INR 1.9* 1.8* 2.0*     APTT:No results for input(s): APTT in the last 72 hours. LIVER PROFILE:  Recent Labs     05/19/22  0525 05/19/22  0415 05/19/22  0400   AST 1,420* 1,463* 1,458*   ALT 1,551* 1,461* 1,467*     Lab Results   Component Value Date/Time    ALT (SGPT) 1,551 (H) 05/19/2022 05:25 AM    AST (SGOT) 1,420 (H) 05/19/2022 05:25 AM    Alk. phosphatase 91 05/19/2022 05:25 AM    Bilirubin, direct 0.2 05/19/2022 04:15 AM    Bilirubin, total 0.5 05/19/2022 05:25 AM       Imaging Last 24 Hours:  US ABD LTD    Result Date: 5/19/2022  Elevated LFTs. Comparison abdomen ultrasound 2/27/2022. Findings: Transabdominal ultrasound of the right upper quadrant. LIVER: Normal size and echotexture. No identified mass or biliary ductal dilation. Main portal vein normal flow directionality. GALLBLADDER: No stones, sludge. Question pericholecystic fluid. CBD: Nondilated 0.35 cm SONOGRAPHIC FITZGERALD'S SIGN: Not provided. PANCREAS: Visualized portions unremarkable. RIGHT KIDNEY: 10.8 cm long axis. Increased renal cortical echotexture. No hydronephrosis, identified stone or solid mass. Proximal IVC normal grayscale. Proximal aorta normal caliber. Right pleural effusion. 1. Question pericholecystic fluid. No identified gallstones or sludge. 2. Right pleural effusion.  3. Increased right renal echotexture for medical renal disease. XR CHEST PORT    Result Date: 5/19/2022  Portable chest: History:Intubation COMPARISON: Portable chest 5/18/2022 There is ill-defined haziness in the mid to lower lung zones. Heart is borderline enlarged. No mediastinal abnormality. Endotracheal tube with the tip 3 cm above the jennifer. There is hardware applied to the cervical spine. Ill-defined haziness in the mid to lower lung zones suspect for mild atelectatic changes and/or interstitial fluid or pleural fluid. XR CHEST PORT    Result Date: 5/18/2022  Chest, frontal view, 5/18/2022 History: Intubation. Comparison: Including chest 5/14/2022. Findings: There are surgical hardware at the cervical spine. New endotracheal tube tip is 4.4 cm the jennifer. The cardiac silhouette is stable. Lung volumes are not significantly changed. Pulmonary vascular congestion and associated opacities in the lungs suggesting hydrostatic edema are increased or accentuated by patient rotation as compared to chest 5/14/2022. No pleural effusion or pneumothorax is identified. The osseous structures are stable. Intubation. Findings suggesting hydrostatic edema. ECHO ADULT COMPLETE    Result Date: 5/19/2022    Left Ventricle: Normal left ventricular systolic function with a visually estimated EF of 20 - 25%. Left ventricle size is normal. Mildly increased wall thickness. Severe global hypokinesis present. Normal diastolic function.   Mitral Valve: Mild annular calcification of the mitral valve.   Tricuspid Valve: Moderate regurgitation.   Pulmonic Valve: Moderate regurgitation.   Left Atrium: Left atrium is mildly dilated. Assessment//Plan   Active Problems:    Sacral ulcer ([address] (5/14/2022)      Assessment:  (Acute kidney injury-in the setting of soft tissue infection and vasopressor dependent shock  Hyperkalemia  Respiratory alkalosis).      Plan:   ([facility] as needed to maintain potassium less than 5.3  Urine output substantial and not on diuretics at this time. Can continue on current blood pressure regimen  Appreciate cardiology recommendations  Treatment of underlying sepsis with antibiotics and maintaining MAP above 65  Patient would need long-term follow-up with nephrology given evidence of impaired renal function).        Electronically signed by Fide Rizzo MD on 5/19/2022 at 11:45 AM

## 2022-05-19 NOTE — PROGRESS NOTES
OT eval order received and acknowledged, however, patient transferred to ICU following surgery and is currently intubated and sedated. OT eval orders will be discontinued and will need new OT eval order once pt medically stable for evaluation. Thank you.

## 2022-05-19 NOTE — PROGRESS NOTES
Progress Note    Patient: Jean Lr MRN: [id number]  SSN: [id number]    YOB: 1945  Age: 78 y.o. Sex: female      Admit Date: 5/14/2022    LOS: 5 days     Subjective:   Patient followed for sacral wound infection with repeat culture growing Acinetobacter and Enterococci. Left foot wound also grew Acinetobacter. She was taken to the OR yesterday for sacral wound debridement and loop colostomy, but became hypotensive. She was placed on Levophed and intubated. Her LFTs increased significantly which I attributed hypotension when her B/P dropped to 82/50 the evening before. There is however, concern that Unasyn might be responsible. GI has been consulted. Today, her WBC has decreased but procal and CRP increasing. Objective:     Vitals:    05/19/22 0600 05/19/22 0644 05/19/22 0700 05/19/22 0800   BP: 122/67  (!) 119/58    Pulse: 65  65 66   Resp: 12  12    Temp:   97.5 °F (36.4 °C)    SpO2: 100%  100%    Weight:  188 lb 11.4 oz (85.6 kg)     Height:            Intake and Output:  Current Shift: No intake/output data recorded. Last three shifts: 05/17 1901 - 05/19 0700  In: 3522.4 [I.V.:1692.1]  Out: 1440 [Urine:1440]    Physical Exam:    Vitals and nursing note reviewed. Constitutional:       General: She is not in acute distress. Appearance: She is ill-appearing. HENT: ET Tube  Eyes: closed   Cardiovascular:      Rate and Rhythm: Normal rate and regular rhythm. Heart sounds: No murmur heard. Pulmonary: diffuse rhonchi     Effort: Pulmonary effort is normal.      Breath sounds: Normal breath sounds. Abdominal:      General: Bowel sounds are normal.      Palpations: Abdomen is soft. Tenderness: There is no abdominal tenderness. Comments: PEG site unremarkable   Genitourinary:     Comments: Indwelling Bell with clear urine             Musculoskeletal:      Cervical back: Neck supple. Right lower leg: No edema. Left lower leg: No edema. Comments: Left foot wound with normal granulation tissue   Skin:     Findings: No rash. Comments: Stage 3 sacral wound   Neurological: intubated   Psychiatric:  intubated      Lab/Data Review:     WBC 13,100  AST 1,420 <>2000/ALT 1,492    Procal 2.32 <0.59 <0.57  CRP 18.00 <18.90 <17.90    Blood cultures (5/14) Coagulase negative Staphylococci  Sacral wound (5/14) Acinetobacter baumannii sensitive to Unasyn, Cefepime, Ceftazidime and Enterococcus faecium  Left great toe (5/15) Acinetobacter baumannii     Assessment:     Active Problems:    Sacral ulcer ([address] (5/14/2022)    1. Sacral wound infection secondary now to Acinetobacter baumannii and Enterococcus faecium, on Vancomycin and Unasyn  2. Sepsis with persistent leukocytosis, elevated procal and CRP  3. Left foot wound, culture growing Acinetobacter baumannii  4. Possible ischemic hepatitiis with transaminitis following hypotensive episode, ? Unasyn  5. Positive blood cultures for Coagulase negative Staphylococci, probable contaminant    Comment:  WBC decreased slightly but CRP unchanged. Plan:   1. Continue IV Vancomycin  2. Discontinue Unasyn for now  3. Start Ceftazidime for Acinetobacter  4. Follow-up US Abdomen  5. Follow-up blood cultures  6. In am, repeat CBC, CRP, procal  7. Follow-up hepatitis panel  8.  GI consult pending    Signed By: Carlotta Steele MD     May 19, 2022

## 2022-05-19 NOTE — PROGRESS NOTES
Surgery critical care Progress Note     Subjective:   Patient examined in ICU. Hospital Course:  Patient is currently intubated. Subjective:  Unable to assess. Review of Systems  Unable to assess. Objective:      Visit Vitals  /67   Pulse 65   Temp 98.1 °F (36.7 °C)   Resp 12   Ht 5' 7\" (1.702 m)   Wt 188 lb 11.4 oz (85.6 kg)   SpO2 100%   Breastfeeding No   BMI 29.56 kg/m²       Patient intubated  Head and neck atraumatic, normocephalic. ENT: No hoarse voice  Cardiac system regular rate rhythm. Pulmonary no audible wheeze  Chest wall excursion normal with respiration cycle  Abdomen is soft not particularly distended. Neurologically nonfocal.  Skin is warm and moist.  Psychosocial: Unable to assess  Vascular examination as previously noted no changes. Data Review reviewed. Assessment / Plan:  Surgery was canceled yesterday. Patient was brought back to ICU intubated. Patient currently on liver failure. And the renal function has slightly gotten worse. So far etiology of liver failure unclear. 48 hours ago patient liver enzymes normal.  In the morning of surgery liver enzyme was significantly high. Prior night patient was not hypotensive. We do not believe this was secondary to hypotension. Unasyn was reviewed for side effect profile including acute hepatitis. I contacted Dr. Gloria Burton possibly holding the Unasyn. Apparently patient got another dose of Unasyn last night. I told nursing staff hold the Unasyn until I talk to Dr. Gloria Burton. Liver enzymes persistently high but stabilizing. INR is 1.9. I cannot perform the surgery anterior liver enzymes which may be normalizing. We will hold off transfusing fresh was plasma due to persistent high liver function tests. I think INR should reverse gradually as liver enzymes reverses itself. Patient currently hemodynamic stable. ABG is very reasonable.     Renal function is slightly worse most likely hepatorenal syndrome. Again we will hold off surgery until liver enzymes reasonably under control. Critical care time spent 30minutes involving direct patient care as well as reviewing patient's labs and coordination of care with nursing staff     Care Plan discussed with: Patient/Family/RN/Case Management           Total time spent with patient: 30 minutes.

## 2022-05-20 NOTE — PROGRESS NOTES
Vancomycin Dosing Consult  Day #6 of vancomycin therapy  Consult ordered by Dr. Nahed Horn for this 78 y.o. female for indication of decubitus ulcer infection. Antibiotic regimen: Vancomycin + Unasyn    Temp (24hrs), Av.7 °F (37.1 °C), Min:97.8 °F (36.6 °C), Max:99.6 °F (37.6 °C)    Recent Labs     22  0540 22  1720 22  0525 22  0415 22  0400 22  0400 22  1600 22  1600   WBC 17.2*  --   --  13.1*  --   --   --  13.9*   CREA 1.45* 1.40* 1.70*  --    < > 1.67*   < > 1.76*   BUN 55*  --  67*  --   --  65*   < > 68*    < > = values in this interval not displayed. Est CrCl: ~35-40 ml/min; UO: 0.7 mL  Concomitant nephrotoxic drugs: None    Cultures:    Wound: Moderate MDR Pseudomonas aeruginosa susceptible to Zerbaxa, aminoglycosides), moderate mixed skin yasmine, moderate mixed enteric yasmine including yeast, final   Blood: CoNS in the anaerobic bottle, final   Wound, ulcer:  Moderate Acinetobacter baumannii, moderate Enterococcus faecium, light >2 organisms (contraindicated)  5/15 Wound, great toe: Heavy Acinetobacter baumannii complex (Zosyn resistant, susceptible to Unasyn), heavy Diphtheroids   Sputum: Pending    MRSA Swab: Not ordered, patient already received first dose of vancomycin    Target range: Trough 10-15 mcg/mL    Recent level history:  Date/Time Dose & Interval Measured Level (mcg/mL)    @ 1300 750 mg q12h 24.2    @ 1600 500 mg q12h 28.2    @ 1720 Held 24.9        Assessment/Plan:   · Last febrile , leukocytosis trending up, procal and CRP trending down  · RICARDO, SCr trending down  · Level remains supratherapeutic, hold dose today and check a level tomorrow AM  · Antimicrobial stop date TBD

## 2022-05-20 NOTE — PROGRESS NOTES
Progress Note    Patient: Maddy Enriquez MRN: [id number]  SSN: [id number]    YOB: 1945  Age: 78 y.o. Sex: female      Admit Date: 5/14/2022    LOS: 6 days     Subjective:     Patient seen, remained intubation  Past Medical History:   Diagnosis Date    Anemia     Chronic kidney disease     [facility] ([facility]     Hemiplegia affecting dominant side, post-stroke ([facility] 05/04/2022    HTN (hypertension)     Hyperkalemia     Hyperlipidemia     Ill-defined condition     Neuropathy     PAD (peripheral artery disease) (HCC)     PCI    Sciatica     Stroke ([facility]     UTI (urinary tract infection)         Current Facility-Administered Medications:     ampicillin-sulbactam (UNASYN) 3 g in 0.9% sodium chloride (MBP/ADV) 100 mL MBP, 3 g, IntraVENous, Q8H, Moses Monzon MD    [START ON 5/21/2022] Vancomycin - Please draw random level w/ AM labs 5/21, , Other, ONCE, Bruce Wolf W, DO    dextrose 10% infusion 0-250 mL, 0-250 mL, IntraVENous, PRN, Israel Soto MD, 125 mL at 05/19/22 0537    phytonadione (vitamin K1) (MEPHYTON) tablet 10 mg, 10 mg, Oral, DAILY, Kourtney Quintanilla MD, 10 mg at 05/20/22 0900    Vancomycin - Draw random level at 1800 on 5/20.  Thanks!, , Other, ONCE, Israel Soto MD    insulin glargine (LANTUS) injection 10 Units, 10 Units, SubCUTAneous, QHS, Israel Soto MD, 10 Units at 05/19/22 2233    NOREPINephrine (LEVOPHED) 32,000 mcg in dextrose 5% 250 mL (128 mcg/mL) infusion, 2 mcg/min, IntraVENous, TITRATE, Adalberto Fields MD    propofol (DIPRIVAN) 10 mg/mL infusion, 0-50 mcg/kg/min, IntraVENous, TITRATE, Adalberto Fields MD, Last Rate: 11.9 mL/hr at 05/19/22 2106, 25 mcg/kg/min at 05/19/22 2106    sodium zirconium cyclosilicate (LOKELMA) powder packet 10 g, 10 g, Oral, DAILY, Ciara Louise MD    0.9% sodium chloride infusion 250 mL, 250 mL, IntraVENous, PRN, Fernando Soto MD    sodium hypochlorite (HALF STRENGTH DAKIN'S) 0.25% irrigation (bottle), , Topical, BID, Israel Soto MD, Given at 05/20/22 0851    glucose chewable tablet 16 g, 4 Tablet, Oral, PRN, Jennifer Soto MD    glucagon [facility]) injection 1 mg, 1 mg, IntraMUSCular, PRN, Jennifer Soto MD    insulin lispro (HUMALOG) injection, , SubCUTAneous, Q6H, Israel Soto MD, 3 Units at 05/20/22 1227    Vancomycin Pharmacy Dosing, , Other, Rx Dosing/Monitoring, Melodie Gottron W, DO    sodium chloride (NS) flush 5-40 mL, 5-40 mL, IntraVENous, Q8H, Orestes Norman MD, 10 mL at 05/20/22 0621    acetaminophen (TYLENOL) tablet 650 mg, 650 mg, Oral, Q6H PRN, 650 mg at 05/18/22 0056 **OR** acetaminophen (TYLENOL) suppository 650 mg, 650 mg, Rectal, Q6H PRN, Howard JACKSON MD, 650 mg at 05/16/22 2223    polyethylene glycol (MIRALAX) packet 17 g, 17 g, Oral, DAILY PRN, Howard JACKSON MD    ondansetron (ZOFRAN ODT) tablet 4 mg, 4 mg, Oral, Q8H PRN **OR** ondansetron (ZOFRAN) injection 4 mg, 4 mg, IntraVENous, Q6H PRN, Noel Garcia MD    [Held by provider] enoxaparin (LOVENOX) injection 40 mg, 40 mg, SubCUTAneous, DAILY, Orestes Norman MD, 40 mg at 05/17/22 1006    famotidine (PEPCID) tablet 20 mg, 20 mg, Oral, BID, Howard JACKSON MD, 20 mg at 05/20/22 0841    acidophilus-pectin, citrus tablet 2 Tablet, 2 Tablet, Oral, DAILY, Orestes Norman MD, 2 Tablet at 05/20/22 0841    albuterol-ipratropium (DUO-NEB) 2.5 MG-0.5 MG/3 ML, 3 mL, Nebulization, Q6H PRN, Orestes Norman MD    amLODIPine (NORVASC) tablet 5 mg, 5 mg, Oral, DAILY, Orestes Norman MD, 5 mg at 05/20/22 0841    artificial saliva (MOUTH KOTE) 1 Spray, 1 Spray, Oral, TID, Howard JACKSON MD, 1 [place] at 05/18/22 0857    aspirin delayed-release tablet 81 mg, 81 mg, Oral, DAILY, Orestes Norman MD, 81 mg at 05/18/22 0854    brimonidine (ALPHAGAN) 0.2 % ophthalmic solution 1 Drop, 1 Drop, Both Eyes, TID, Howard JACKSON MD, 1 Drop at 05/20/22 0841    cholestyramine-aspartame (QUESTRAN LIGHT) packet 4 g, 4 g, Oral, BID WITH MEALS, Orestes Norman MD, 4 g at 05/20/22 0840    [Held by provider] fenofibrate nanocrystallized (TRICOR) tablet 48 mg, 48 mg, Oral, DAILY, Orestes Norman MD, 48 mg at 05/19/22 2331    ferrous sulfate tablet 325 mg, 325 mg, Oral, BID, Radha JACKSON MD, 325 mg at 05/20/22 0841    gabapentin (NEURONTIN) capsule 300 mg, 300 mg, Oral, BID, Radha JACKSON MD, 300 mg at 05/20/22 0841    hydrALAZINE (APRESOLINE) tablet 50 mg, 50 mg, Oral, TID, Jesus Marin MD, 50 mg at 05/18/22 0854    insulin glargine (LANTUS) injection 50 Units, 50 Units, SubCUTAneous, DAILY, Orestes Norman MD, 50 Units at 05/20/22 0840    latanoprost (XALATAN) 0.005 % ophthalmic solution 1 Drop, 1 Drop, Right Eye, QPM, Jesus Marin MD, 1 Drop at 05/19/22 1732    metoprolol tartrate (LOPRESSOR) tablet 50 mg, 50 mg, Oral, BID, Radha JACKSON MD, 50 mg at 05/18/22 0855    traMADoL (ULTRAM) tablet 25 mg, 25 mg, Oral, Q12H PRN, Radha JACKSON MD, 25 mg at 05/18/22 0056    Objective:     Vitals:    05/20/22 1133 05/20/22 1139 05/20/22 1154 05/20/22 1209   BP:  (!) 119/41 (!) 111/54 (!) 113/53   Pulse: 63  62 63   Resp: 12 12 12 12   Temp:  98.5 °F (36.9 °C) 98.6 °F (37 °C) 98.9 °F (37.2 °C)   SpO2: 100% 100% 100% 100%   Weight:       Height:            Intake and Output:  Current Shift: No intake/output data recorded. Last three shifts: 05/18 1901 - 05/20 0700  In: 1969.1 [I.V.:746.1]  Out: 2370 [Urine:1970; Drains:400]    Physical Exam:   Physical Exam  Constitutional:       Appearance: She is ill-appearing. Eyes:      General: Scleral icterus present. Neck:      Vascular: No carotid bruit. Cardiovascular:      Rate and Rhythm: Rhythm irregular. Heart sounds: Normal heart sounds. Pulmonary:      Effort: Respiratory distress present. Abdominal:      General: Abdomen is flat.  Bowel sounds are normal.   Neurological:      Mental Status: Mental status is at baseline.           Lab/Data Review:  Recent Results (from the past 24 hour(s))   CULTURE, RESPIRATORY/SPUTUM/BRONCH W GRAM STAIN    Collection Time: 05/19/22  2:48 PM    Specimen: Fluid; Sputum   Result Value Ref Range    Special Requests: No Special Requests      GRAM STAIN 1+ WBCs seen      GRAM STAIN no epithelial cells seen      GRAM STAIN No organisms seen      Culture result: PENDING    GLUCOSE, POC    Collection Time: 05/19/22  5:03 PM   Result Value Ref Range    Glucose (POC) 134 (H) 65 - 117 mg/dL    Performed by Perry Pham, RANDOM    Collection Time: 05/19/22  5:20 PM   Result Value Ref Range    Vancomycin, random 24.9 ug/mL   CREATININE    Collection Time: 05/19/22  5:20 PM   Result Value Ref Range    Creatinine 1.40 (H) 0.55 - 1.02 mg/dL   PTT    Collection Time: 05/19/22  5:20 PM   Result Value Ref Range    aPTT 32.2 21.2 - 34.1 sec    aPTT, therapeutic range   82 - 109 sec   FIBRINOGEN    Collection Time: 05/19/22  5:20 PM   Result Value Ref Range    Fibrinogen 741 (H) 220 - 535 mg/dL   GLUCOSE, POC    Collection Time: 05/20/22 12:16 AM   Result Value Ref Range    Glucose (POC) 201 (H) 65 - 117 mg/dL    Performed by Yohan Sommers    BLOOD GAS, ARTERIAL    Collection Time: 05/20/22  4:28 AM   Result Value Ref Range    pH 7.45 7.35 - 7.45      PCO2 29 (L) 35 - 45 mmHg    PO2 83 75 - 100 mmHg    O2 SAT 98 >95 %    BICARBONATE 21 (L) 22 - 26 mmol/L    BASE DEFICIT 4.0 (H) 0 - 2 mmol/L    O2 METHOD VENT      FIO2 30.0 %    MODE Assist Control/Volume Control      Tidal volume 500      SET RATE 12      EPAP/CPAP/PEEP 5.0      SITE Right Radial      EVELIN'S TEST PASS     PROCALCITONIN    Collection Time: 05/20/22  5:40 AM   Result Value Ref Range    Procalcitonin 1.72 (H) 0 ng/mL   CBC W/O DIFF    Collection Time: 05/20/22  5:40 AM   Result Value Ref Range    WBC 17.2 (H) 3.6 - 11.0 K/uL    RBC 2.45 (L) 3.80 - 5.20 M/uL    HGB 6.8 (L) 11.5 - 16.0 g/dL    HCT 22.0 (L) 35.0 - 47.0 %    MCV 89.8 80.0 - 99.0 FL    MCH 27.8 26.0 - 34.0 PG    MCHC 30.9 30.0 - 36.5 g/dL    RDW 17.3 (H) 11.5 - 14.5 %    PLATELET 764 649 - 502 K/uL    MPV 11.2 8.9 - 12.9 FL    NRBC 5.1 (H) 0.0  WBC    ABSOLUTE NRBC 0.87 (H) 0.00 - 3.94 K/uL   METABOLIC PANEL, COMPREHENSIVE    Collection Time: 05/20/22  5:40 AM   Result Value Ref Range    Sodium 144 136 - 145 mmol/L    Potassium 3.7 3.5 - 5.1 mmol/L    Chloride 118 (H) 97 - 108 mmol/L    CO2 19 (L) 21 - 32 mmol/L    Anion gap 7 5 - 15 mmol/L    Glucose 240 (H) 65 - 100 mg/dL    BUN 55 (H) 6 - 20 mg/dL    Creatinine 1.45 (H) 0.55 - 1.02 mg/dL    BUN/Creatinine ratio 38 (H) 12 - 20      GFR est AA 43 (L) >60 ml/min/1.73m2    GFR est non-AA 35 (L) >60 ml/min/1.73m2    Calcium 8.6 8.5 - 10.1 mg/dL    Bilirubin, total 0.4 0.2 - 1.0 mg/dL    AST (SGOT) 802 (H) 15 - 37 U/L    ALT (SGPT) 1,361 (H) 12 - 78 U/L    Alk.  phosphatase 99 45 - 117 U/L    Protein, total 6.4 6.4 - 8.2 g/dL    Albumin 1.5 (L) 3.5 - 5.0 g/dL    Globulin 4.9 (H) 2.0 - 4.0 g/dL    A-G Ratio 0.3 (L) 1.1 - 2.2     PROTHROMBIN TIME + INR    Collection Time: 05/20/22  5:40 AM   Result Value Ref Range    Prothrombin time 20.9 (H) 11.9 - 14.6 sec    INR 1.8 (H) 0.9 - 1.1     PTT    Collection Time: 05/20/22  5:40 AM   Result Value Ref Range    aPTT 31.7 21.2 - 34.1 sec    aPTT, therapeutic range   82 - 109 sec   C REACTIVE PROTEIN, QT    Collection Time: 05/20/22  5:40 AM   Result Value Ref Range    C-Reactive protein 17.10 (H) 0.00 - 0.60 mg/dL   MAGNESIUM    Collection Time: 05/20/22  5:40 AM   Result Value Ref Range    Magnesium 2.4 1.6 - 2.4 mg/dL   PHOSPHORUS    Collection Time: 05/20/22  5:40 AM   Result Value Ref Range    Phosphorus 3.2 2.6 - 4.7 mg/dL   GLUCOSE, POC    Collection Time: 05/20/22  5:41 AM   Result Value Ref Range    Glucose (POC) 248 (H) 65 - 117 mg/dL    Performed by [employer] [facility]    Collection Time: 05/20/22 10:05 AM   Result Value Ref Range    Crossmatch Expiration 05/23/2022,2359     ABO/Rh(D) O Positive     Antibody screen Negative     Unit number [id number]     Blood component type RC LR     Unit division 00     Status of unit [address] to transfuse     Crossmatch result Compatible    PLASMA, ALLOCATE    Collection Time: 05/20/22 10:30 AM   Result Value Ref Range    Unit number [id number]     Blood component type FP 24h,Thaw1     Unit division 00     Status of unit Allocated     TRANSFUSION STATUS Ok to transfuse     Unit number [id number]     Blood component type FP 24H,Thaw     Unit division 00     Status of unit [place] to transfuse    GLUCOSE, POC    Collection Time: 05/20/22 11:06 AM   Result Value Ref Range    Glucose (POC) 253 (H) 65 - 117 mg/dL    Performed by Ximena Lucas         XR CHEST PORT   Final Result   1. The endotracheal tube is in satisfactory position. 2.  There has been a partial interval resolution in the pulmonary interstitial   edema pattern. XR CHEST PORT   Final Result   Ill-defined haziness in the mid to lower lung zones suspect for mild   atelectatic changes and/or interstitial fluid or pleural fluid. US ABD LTD   Final Result   1. Question pericholecystic fluid. No identified gallstones or sludge. 2. Right pleural effusion. 3. Increased right renal echotexture for medical renal disease. XR CHEST PORT   Final Result   Intubation. Findings suggesting hydrostatic edema. XR CHEST PORT   Final Result   No evidence of an acute cardiopulmonary process.       XR CHEST PORT    (Results Pending)        Assessment:     Active Problems:    Sacral ulcer ([address] (5/14/2022)         Elevated of transaminase, consistent with mild shock liver, hepatic congestion, congestive heart failure, hypoxia, hypotensive episode  trending down    Hepatitis C antibody positive, RNA PCR ordered  chronic liver disease on differential diagnosis,       elevated of PT/INR, normalized after vit K , no liver failure     Gallbladder sludge, but no CBD obstruction on prior ultrasound  Plan:    monitory liver enzymes periodically   ICU monitoring, cardiac and pulmonary care,  Followed by nephrology for renal sufficiency  HCV RNA pending , no acute issue     Sign off     Signed By: Matthew Fuentes MD     May 20, 2022        Thank you for allowing me to participate in this patients care  Cc Referring Physician   Severiano Alley., MD

## 2022-05-20 NOTE — PROGRESS NOTES
IMPRESSION:   1. Acute hypoxic respiratory failure  2. Sacral decubiti ulcer with Acinetobacter and Enterococcus  3. Severe sepsis with septic shock  4. Left foot wound  5. Transaminitis  6. Shock liver  7. Symptomatic anemia  8. Additional workup outlined below  9. Pt is at high risk of sudden decline and decompensation with life threatening consequenses and continued end organ dysfunction and failure  10. Pt is critically ill. Time spent with pt and staff actively rendering care, managing pt and coordinating care as stated below; 30 minutes, exclusive of any procedures      RECOMMENDATIONS/PLAN:   1. ICU monitoring  1. Ventilator for mechanical life support and prevent respiratory arrest with protective lung strategies  2. On assist control mode 30% FiO2 ABG acceptable we will decrease the rate, will continue the vent   3. Awaiting for normalization of liver enzyme then patient will go for diverting loop colostomy sacral wound debridement and left foot wound closure, since then we will continue patient on ventilator  4. Severe sepsis with decubiti ulcer and was supposed to go for sacral wound debridement because of liver failure it was canceled   5. Patient is on Fortaz  6. Chest x-ray showed mild congestive changes  7. Anemia hemoglobin 6.8 will transfuse  8. Chest x-ray shows improvement in the pulmonary edema  9. CVP monitoring off Levophed  10. IV vasopressors for circulatory shock refractory to fluids to maintain SBP> 90  11. Elevated troponin blood culture positive for Staphylococcus most likely contaminant  12. Transfuse prn to maintain Hgb > 7  13. Labs to follow electrolytes, renal function and and blood counts  14. Bronchial hygiene with respiratory therapy techniques, bronchodilators  15. Pt needs IV fluids with additives and Drug therapy requiring intensive monitoring for toxicity  16. Prescription drug management with home med reconciliation reviewed  17. DVT, SUP prophylaxis  18.  Will be available to assist in medical management while in the CCU pending disposition     [x] High complexity decision making was performed  [x] See my orders for details  HPI   81-year-old lady came in because of leg pain past medical history of hypertension diabetes mellitus peripheral vascular disease CVA she is bedbound she has leg wound and also has sacral decubiti ulcer and she was scheduled for laparoscopic colostomy and sacral wound debridement and amputation of the left tarsometatarsal because of wound infection but her condition got worse her liver enzyme was elevated INR was also elevated she was intubated transferred to ICU was getting vancomycin and Unasyn started on Levophed because of low blood pressure. PMH:  has a past medical history of Anemia, Chronic kidney disease, Diabetes mellitus ([address] Hemiplegia affecting dominant side, post-stroke ([address] (05/04/2022), HTN (hypertension), Hyperkalemia, Hyperlipidemia, Ill-defined condition, Neuropathy, PAD (peripheral artery disease) ([address] Sciatica, Stroke ([address] and UTI (urinary tract infection). PSH:   has a past surgical history that includes hx other surgical (Bilateral); hx amputation (Right); hx other surgical; hx cervical fusion; hx vascular stent (Bilateral); hx vascular stent (Bilateral); and hx gi. FHX: family history includes Cancer in her mother; Diabetes in her mother; Hypertension in her mother. SHX:  reports that she has never smoked. She has never used smokeless tobacco. She reports previous alcohol use. She reports that she does not use drugs. ALL: No Known Allergies     MEDS:   [x] Reviewed - As Below   [] Not reviewed    Current Facility-Administered Medications   Medication    dextrose 10% infusion 0-250 mL    cefTAZidime (FORTAZ) 1 g in 0.9% sodium chloride (MBP/ADV) 50 mL MBP    phytonadione (vitamin K1) (MEPHYTON) tablet 10 mg    Vancomycin - Draw random level at 1800 on 5/20.  Thanks!    0.9% sodium chloride infusion 250 mL    insulin glargine (LANTUS) injection 10 Units    0.9% sodium chloride infusion 250 mL    0.9% sodium chloride infusion 250 mL    NOREPINephrine (LEVOPHED) 32,000 mcg in dextrose 5% 250 mL (128 mcg/mL) infusion    0.9% sodium chloride infusion 250 mL    propofol (DIPRIVAN) 10 mg/mL infusion    sodium zirconium cyclosilicate (LOKELMA) powder packet 10 g    0.9% sodium chloride infusion 250 mL    sodium hypochlorite (HALF STRENGTH DAKIN'S) 0.25% irrigation (bottle)    glucose chewable tablet 16 g    glucagon (GLUCAGEN) injection 1 mg    insulin lispro (HUMALOG) injection    Vancomycin Pharmacy Dosing    sodium chloride (NS) flush 5-40 mL    acetaminophen (TYLENOL) tablet 650 mg    Or    acetaminophen (TYLENOL) suppository 650 mg    polyethylene glycol (MIRALAX) packet 17 g    ondansetron (ZOFRAN ODT) tablet 4 mg    Or    ondansetron (ZOFRAN) injection 4 mg    [Held by provider] enoxaparin (LOVENOX) injection 40 mg    famotidine (PEPCID) tablet 20 mg    acidophilus-pectin, citrus tablet 2 Tablet    albuterol-ipratropium (DUO-NEB) 2.5 MG-0.5 MG/3 ML    amLODIPine (NORVASC) tablet 5 mg    artificial saliva (MOUTH KOTE) 1 Spray    aspirin delayed-release tablet 81 mg    brimonidine (ALPHAGAN) 0.2 % ophthalmic solution 1 Drop    cholestyramine-aspartame (QUESTRAN LIGHT) packet 4 g    [Held by provider] fenofibrate nanocrystallized (TRICOR) tablet 48 mg    ferrous sulfate tablet 325 mg    gabapentin (NEURONTIN) capsule 300 mg    hydrALAZINE (APRESOLINE) tablet 50 mg    insulin glargine (LANTUS) injection 50 Units    latanoprost (XALATAN) 0.005 % ophthalmic solution 1 Drop    metoprolol tartrate (LOPRESSOR) tablet 50 mg    traMADoL (ULTRAM) tablet 25 mg      MAR reviewed and pertinent medications noted or modified as needed   Current Facility-Administered Medications   Medication    dextrose 10% infusion 0-250 mL    cefTAZidime (FORTAZ) 1 g in 0.9% sodium chloride (MBP/ADV) 50 mL MBP    phytonadione (vitamin K1) (MEPHYTON) tablet 10 mg    Vancomycin - Draw random level at 1800 on 5/20.  Thanks!    0.9% sodium chloride infusion 250 mL    insulin glargine (LANTUS) injection 10 Units    0.9% sodium chloride infusion 250 mL    0.9% sodium chloride infusion 250 mL    NOREPINephrine (LEVOPHED) 32,000 mcg in dextrose 5% 250 mL (128 mcg/mL) infusion    0.9% sodium chloride infusion 250 mL    propofol (DIPRIVAN) 10 mg/mL infusion    sodium zirconium cyclosilicate (LOKELMA) powder packet 10 g    0.9% sodium chloride infusion 250 mL    sodium hypochlorite (HALF STRENGTH DAKIN'S) 0.25% irrigation (bottle)    glucose chewable tablet 16 g    glucagon (GLUCAGEN) injection 1 mg    insulin lispro (HUMALOG) injection    Vancomycin Pharmacy Dosing    sodium chloride (NS) flush 5-40 mL    acetaminophen (TYLENOL) tablet 650 mg    Or    acetaminophen (TYLENOL) suppository 650 mg    polyethylene glycol (MIRALAX) packet 17 g    ondansetron (ZOFRAN ODT) tablet 4 mg    Or    ondansetron (ZOFRAN) injection 4 mg    [Held by provider] enoxaparin (LOVENOX) injection 40 mg    famotidine (PEPCID) tablet 20 mg    acidophilus-pectin, citrus tablet 2 Tablet    albuterol-ipratropium (DUO-NEB) 2.5 MG-0.5 MG/3 ML    amLODIPine (NORVASC) tablet 5 mg    artificial saliva (MOUTH KOTE) 1 Spray    aspirin delayed-release tablet 81 mg    brimonidine (ALPHAGAN) 0.2 % ophthalmic solution 1 Drop    cholestyramine-aspartame (QUESTRAN LIGHT) packet 4 g    [Held by provider] fenofibrate nanocrystallized (TRICOR) tablet 48 mg    ferrous sulfate tablet 325 mg    gabapentin (NEURONTIN) capsule 300 mg    hydrALAZINE (APRESOLINE) tablet 50 mg    insulin glargine (LANTUS) injection 50 Units    latanoprost (XALATAN) 0.005 % ophthalmic solution 1 Drop    metoprolol tartrate (LOPRESSOR) tablet 50 mg    traMADoL (ULTRAM) tablet 25 mg      PMH:  has a past medical history of Anemia, Chronic kidney disease, Diabetes mellitus ([facility] [address] Hemiplegia affecting dominant side, post-stroke ([facility] [address] (2022), HTN (hypertension), Hyperkalemia, Hyperlipidemia, Ill-defined condition, Neuropathy, PAD (peripheral artery disease) ([facility] [address] Sciatica, Stroke ([facility] [address] and UTI (urinary tract infection). PSH:   has a past surgical history that includes hx other surgical (Bilateral); hx amputation (Right); hx other surgical; hx cervical fusion; hx vascular stent (Bilateral); hx vascular stent (Bilateral); and hx gi. FHX: family history includes Cancer in her mother; Diabetes in her mother; Hypertension in her mother. SHX:  reports that she has never smoked. She has never used smokeless tobacco. She reports previous alcohol use. She reports that she does not use drugs. ROS:  Unable to obtain intubated on ventilator    Hemodynamics:    CO:    CI:    CVP:    SVR:   PAP Systolic:    PAP Diastolic:    PVR:    LT86:        Ventilator Settings:      Mode Rate TV Press PEEP FiO2 PIP Min. Vent   Assist control,Volume control    500 ml 5 cm H2O  5 cm H20 30 %  23 cm H2O  6.7 l/min        Vital Signs: Telemetry:    normal sinus rhythm Intake/Output:   Visit Vitals  BP (!) 116/51   Pulse 72   Temp 99 °F (37.2 °C)   Resp 23   Ht 5' 7.01\" (1.702 m)   Wt 85.8 kg (189 lb 2.5 oz)   SpO2 100%   Breastfeeding No   BMI 29.62 kg/m²       Temp (24hrs), Av.3 °F (36.8 °C), Min:97.4 °F (36.3 °C), Max:99 °F (37.2 °C)        O2 Device: Ventilator O2 Flow Rate (L/min): 1 l/min       Wt Readings from Last 4 Encounters:   22 85.8 kg (189 lb 2.5 oz)   04/10/22 86.6 kg (191 lb)   22 82.2 kg (181 lb 3.5 oz)   20 84.4 kg (186 lb)          Intake/Output Summary (Last 24 hours) at 2022 0859  Last data filed at 2022 0600  Gross per 24 hour   Intake 1435.03 ml   Output 1780 ml   Net -344.97 ml       Last shift:      No intake/output data recorded.   Last 3 shifts: 1901 -  0700  In: 1969.1 [I.V.:746.1]  Out: 2370 [OWOZF:8660; Drains:400] Physical Exam:     General: intubated on vent  HEENT: NCAT, poor dentition, lips and mucosa dry  Eyes: anicteric; conjunctiva clear  Neck: no nodes, no cuff leak, trach midline; no accessory MM use. Chest: no deformity,   Cardiac: R regular; no murmur;   Lungs: distant breath sounds; no wheezes  Abd: soft, NT, hypoactive BS  Ext: no edema; no joint swelling; No clubbing  : NO giraldo, clear urine  Neuro: sedated;   Psych-  unable to assess  Skin: warm, dry, no cyanosis;   Pulses: 1-2+ Bilateral pedal, radial  Capillary: brisk; pale      DATA:    MAR reviewed and pertinent medications noted or modified as needed  MEDS:   Current Facility-Administered Medications   Medication    dextrose 10% infusion 0-250 mL    cefTAZidime (FORTAZ) 1 g in 0.9% sodium chloride (MBP/ADV) 50 mL MBP    phytonadione (vitamin K1) (MEPHYTON) tablet 10 mg    Vancomycin - Draw random level at 1800 on 5/20.  Thanks!    0.9% sodium chloride infusion 250 mL    insulin glargine (LANTUS) injection 10 Units    0.9% sodium chloride infusion 250 mL    0.9% sodium chloride infusion 250 mL    NOREPINephrine (LEVOPHED) 32,000 mcg in dextrose 5% 250 mL (128 mcg/mL) infusion    0.9% sodium chloride infusion 250 mL    propofol (DIPRIVAN) 10 mg/mL infusion    sodium zirconium cyclosilicate (LOKELMA) powder packet 10 g    0.9% sodium chloride infusion 250 mL    sodium hypochlorite (HALF STRENGTH DAKIN'S) 0.25% irrigation (bottle)    glucose chewable tablet 16 g    glucagon (GLUCAGEN) injection 1 mg    insulin lispro (HUMALOG) injection    Vancomycin Pharmacy Dosing    sodium chloride (NS) flush 5-40 mL    acetaminophen (TYLENOL) tablet 650 mg    Or    acetaminophen (TYLENOL) suppository 650 mg    polyethylene glycol (MIRALAX) packet 17 g    ondansetron (ZOFRAN ODT) tablet 4 mg    Or    ondansetron (ZOFRAN) injection 4 mg    [Held by provider] enoxaparin (LOVENOX) injection 40 mg    famotidine (PEPCID) tablet 20 mg    acidophilus-pectin, citrus tablet 2 Tablet    albuterol-ipratropium (DUO-NEB) 2.5 MG-0.5 MG/3 ML    amLODIPine (NORVASC) tablet 5 mg    artificial saliva (MOUTH KOTE) 1 Spray    aspirin delayed-release tablet 81 mg    brimonidine (ALPHAGAN) 0.2 % ophthalmic solution 1 Drop    cholestyramine-aspartame (QUESTRAN LIGHT) packet 4 g    [Held by provider] fenofibrate nanocrystallized (TRICOR) tablet 48 mg    ferrous sulfate tablet 325 mg    gabapentin (NEURONTIN) capsule 300 mg    hydrALAZINE (APRESOLINE) tablet 50 mg    insulin glargine (LANTUS) injection 50 Units    latanoprost (XALATAN) 0.005 % ophthalmic solution 1 Drop    metoprolol tartrate (LOPRESSOR) tablet 50 mg    traMADoL (ULTRAM) tablet 25 mg        Labs:    Recent Labs     05/20/22  0540 05/19/22  1720 05/19/22  1257 05/19/22  0525 05/19/22  0415 05/18/22  1600 05/18/22  1600   WBC 17.2*  --   --   --  13.1*  --  13.9*   HGB 6.8*  --   --   --  7.8*  --  8.1*     --   --   --  314  --  368   INR 1.8*  --  1.8* 1.9*  --    < > 1.8*   APTT 31.7 32.2  --   --   --   --   --     < > = values in this interval not displayed. Recent Labs     05/20/22  0540 05/19/22  1720 05/19/22  0525 05/19/22  0415 05/19/22  0400 05/19/22  0400 05/18/22  1600 05/18/22  0714     --  146*  --   --  147*   < > 144   K 3.7  --  4.0  --   --  4.0   < > 5.6*   *  --  117*  --   --  118*   < > 113*   CO2 19*  --  19*  --   --  21   < > 22   *  --  67  --   --  68   < > 289*   BUN 55*  --  67*  --   --  65*   < > 66*   CREA 1.45* 1.40* 1.70*  --    < > 1.67*   < > 1.55*   CA 8.6  --  8.8  --   --  9.2   < > 8.3*   MG 2.4  --   --   --   --   --   --  2.9*   PHOS 3.2  --   --   --   --   --   --  3.9   ALB 1.5*  --  1.6* 1.6*   < > 1.6*   < > 1.6*   ALT 1,361*  --  1,551* 1,461*   < > 1,467*   < > 1,492*    < > = values in this interval not displayed.      Recent Labs     05/20/22  0428 05/19/22  0400 05/18/22  1730   PH 7.45 7.51* 7.52*   PCO2 29* 26* 25*   PO2 83 68* 154*   HCO3 21* 23 23   FIO2 30.0 30.0 60.0     No results for input(s): CPK, CKNDX, TROIQ in the last 72 hours. No lab exists for component: CPKMB  No results found for: BNPP, BNP   Lab Results   Component Value Date/Time    Culture result: PENDING 05/19/2022 02:48 PM    Culture result: Heavy Acinetobacter baumannii complex 05/15/2022 07:15 PM    Culture result: Heavy Diphtheroids 05/15/2022 07:15 PM     Lab Results   Component Value Date/Time    TSH 2.880 12/12/2016 10:13 AM        Imaging:    Results from Hospital Encounter encounter on 05/14/22    XR CHEST PORT    Narrative  Reason for Visit:  HISTORY: Congestive heart failure. TECHNIQUE: Portable AP radiograph the chest dated 5/20/2022 obtained at 2:45 AM  COMPARISON: 5/19/2022. LIMITATIONS: Overlying EKG leads. TUBES/LINES:  The patient is intubated and the endotracheal tube tip is located  6 cms above the jennifer just below the thoracic inlet. HEART AND PULMONARY VESSELS: There is moderate enlargement of the cardiac  silhouette, pulmonary vascular distention, and perivascular ill definition. These findings are consistent with mild interstitial edema. There has been  interval decrease in the edema pattern. LUNG PARENCHYMA: This examination is negative for airspace consolidation. PLEURA: Normal without larger pleural effusions. MEDIASTINUM: There is calcified plaque formation within the aortic arch and mild  aortic ectasia. The remainder the mediastinal structures are normal.    BONE/SOFT TISSUES: There are bridging right lateral osteophytes at contiguous  levels along the mid to thoracic lower thoracic spine related to diffuse  idiopathic skeletal hyperostosis. The patient is status post discectomies with  integrated graft fusions at her C5-C6 and her C6-C7 levels. Impression  1. The endotracheal tube is in satisfactory position.   2.  There has been a partial interval resolution in the pulmonary interstitial  edema pattern. Results from [place] encounter on 04/03/22    CT HEAD WO CONT    Narrative  PROCEDURE: CT HEAD WO CONT    HISTORY:Altered mental status    COMPARISON:Head CT 3 March 2022    Department policy stipulates all CT scans at this facility are performed using  dose reduction optimization techniques as appropriate to the performed exam,  including the following: Automated exposure control, adjustments of the mA  and/or KVP according to the patient size, and the use of iterative  reconstruction technique. TECHNIQUE: Without IV contrast    Bones/extracranial soft tissues:  Maxillary sinuses and right frontal sinus  remains nearly completely opacified. Extra-axial spaces:  No hemorrhage, mass or focal fluid collection. Ventricles/sulci:  There is mild dilatation of the ventricles. Sulci are  prominent. Brain parenchyma: An area of encephalomalacia in the left frontal lobe is  showing chronic evolution without evidence of hemorrhagic conversion. No other  focal lesions identified. No mass effect. There is good gray-white differentiation. There are  inhomogeneous areas of mildly decreased density in periventricular white matter. Impression  Chronic changes which appear stable. No acute or active intracranial process.   Chronic paranasal sinus disease      5/20 intubated on ventilator we will continue current vent settings patient is supposed to go for surgery because of transaminitis elevated liver enzyme we will wait as per surgeon for sacral wound debridement and diverting loop colostomy off Levophed, improvement in AST and ALT

## 2022-05-20 NOTE — PROGRESS NOTES
General Daily Progress Note          Patient Name:   Chele Ng       YOB: 1945       Age:  78 y.o. 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  Cipro    Admitted again yesterday concerning for worsening of sacral ulcer    During last admission patient was seen by infectious disease and also seen by the vascular surgeon patient had debridement of wound during the last admission    Patient seen by the infectious disease yesterday    Sacral wound infection recent cultures growing Pseudomonas resistant to Zosyn and meropenem    5/17    Patient alert awake not in distress  Patient was seen by the vascular surgeon    Vascular surgery planned for laparoscopic loop colostomy placement and sacral debridement then wound vacuum  Also try to close the wound on the left foot with TMA    5/18    Resting in the bed not in any distress  Blood sugars running high    Seen by infectious disease continue vancomycin and start on Unasyn    5/19    And went to the OR intubated because of elevated LFT and elevated INR  Surgery was canceled    On ventilator 40% O2  Propofol drip    5/19    Patient on ventilator with 30% O2  On propofol drip    Hemoglobin dropped to 6.8    Patient's daughter at the bedside    Objective:     Visit Vitals  BP (!) 116/51   Pulse 72   Temp 99 °F (37.2 °C)   Resp 23   Ht 5' 7.01\" (1.702 m)   Wt 85.8 kg (189 lb 2.5 oz)   SpO2 100%   Breastfeeding No   BMI 29.62 kg/m²        Recent Results (from the past 24 hour(s))   GLUCOSE, POC    Collection Time: 05/19/22 11:40 AM   Result Value Ref Range    Glucose (POC) 91 65 - 117 mg/dL    Performed by Lainey Romano    PROTHROMBIN TIME + INR    Collection Time: 05/19/22 12:57 PM   Result Value Ref Range    Prothrombin time 21.2 (H) 11.9 - 14.6 sec    INR 1.8 (H) 0.9 - 1.1     CULTURE, RESPIRATORY/SPUTUM/BRONCH W GRAM STAIN    Collection Time: 05/19/22  2:48 PM    Specimen: Fluid; Sputum   Result Value Ref Range    Special Requests: No Special Requests      GRAM STAIN 1+ WBCs seen      GRAM STAIN no epithelial cells seen      GRAM STAIN No organisms seen      Culture result: PENDING    GLUCOSE, POC    Collection Time: 05/19/22  5:03 PM   Result Value Ref Range    Glucose (POC) 134 (H) 65 - 117 mg/dL    Performed by Jose Fisher, RANDOM    Collection Time: 05/19/22  5:20 PM   Result Value Ref Range    Vancomycin, random 24.9 ug/mL   CREATININE    Collection Time: 05/19/22  5:20 PM   Result Value Ref Range    Creatinine 1.40 (H) 0.55 - 1.02 mg/dL   PTT    Collection Time: 05/19/22  5:20 PM   Result Value Ref Range    aPTT 32.2 21.2 - 34.1 sec    aPTT, therapeutic range   82 - 109 sec   FIBRINOGEN    Collection Time: 05/19/22  5:20 PM   Result Value Ref Range    Fibrinogen 741 (H) 220 - 535 mg/dL   GLUCOSE, POC    Collection Time: 05/20/22 12:16 AM   Result Value Ref Range    Glucose (POC) 201 (H) 65 - 117 mg/dL    Performed by [employer]    BLOOD GAS, ARTERIAL    Collection Time: 05/20/22  4:28 AM   Result Value Ref Range    pH 7.45 7.35 - 7.45      PCO2 29 (L) 35 - 45 mmHg    PO2 83 75 - 100 mmHg    O2 SAT 98 >95 %    BICARBONATE 21 (L) 22 - 26 mmol/L    BASE DEFICIT 4.0 (H) 0 - 2 mmol/L    O2 METHOD VENT      FIO2 30.0 %    MODE Assist Control/Volume Control      Tidal volume 500      SET RATE 12      EPAP/CPAP/PEEP 5.0      SITE Right Radial      EVELIN'S TEST PASS     PROCALCITONIN    Collection Time: 05/20/22  5:40 AM   Result Value Ref Range    Procalcitonin 1.72 (H) 0 ng/mL   CBC W/O DIFF    Collection Time: 05/20/22  5:40 AM   Result Value Ref Range    WBC 17.2 (H) 3.6 - 11.0 K/uL    RBC 2.45 (L) 3.80 - 5.20 M/uL    HGB 6.8 (L) 11.5 - 16.0 g/dL    HCT 22.0 (L) 35.0 - 47.0 %    MCV 89.8 80.0 - 99.0 FL    MCH 27.8 26.0 - 34.0 PG    MCHC 30.9 30.0 - 36.5 g/dL    RDW 17.3 (H) 11.5 - 14.5 %    PLATELET 320 927 - 322 K/uL    MPV 11.2 8.9 - 12.9 FL    NRBC 5.1 (H) 0.0  WBC    ABSOLUTE NRBC 0.87 (H) 0.00 - 8.11 K/uL   METABOLIC PANEL, COMPREHENSIVE    Collection Time: 05/20/22  5:40 AM   Result Value Ref Range    Sodium 144 136 - 145 mmol/L    Potassium 3.7 3.5 - 5.1 mmol/L    Chloride 118 (H) 97 - 108 mmol/L    CO2 19 (L) 21 - 32 mmol/L    Anion gap 7 5 - 15 mmol/L    Glucose 240 (H) 65 - 100 mg/dL    BUN 55 (H) 6 - 20 mg/dL    Creatinine 1.45 (H) 0.55 - 1.02 mg/dL    BUN/Creatinine ratio 38 (H) 12 - 20      GFR est AA 43 (L) >60 ml/min/1.73m2    GFR est non-AA 35 (L) >60 ml/min/1.73m2    Calcium 8.6 8.5 - 10.1 mg/dL    Bilirubin, total 0.4 0.2 - 1.0 mg/dL    AST (SGOT) 802 (H) 15 - 37 U/L    ALT (SGPT) 1,361 (H) 12 - 78 U/L    Alk.  phosphatase 99 45 - 117 U/L    Protein, total 6.4 6.4 - 8.2 g/dL    Albumin 1.5 (L) 3.5 - 5.0 g/dL    Globulin 4.9 (H) 2.0 - 4.0 g/dL    A-G Ratio 0.3 (L) 1.1 - 2.2     PROTHROMBIN TIME + INR    Collection Time: 05/20/22  5:40 AM   Result Value Ref Range    Prothrombin time 20.9 (H) 11.9 - 14.6 sec    INR 1.8 (H) 0.9 - 1.1     PTT    Collection Time: 05/20/22  5:40 AM   Result Value Ref Range    aPTT 31.7 21.2 - 34.1 sec    aPTT, therapeutic range   82 - 109 sec   C REACTIVE PROTEIN, QT    Collection Time: 05/20/22  5:40 AM   Result Value Ref Range    C-Reactive protein 17.10 (H) 0.00 - 0.60 mg/dL   MAGNESIUM    Collection Time: 05/20/22  5:40 AM   Result Value Ref Range    Magnesium 2.4 1.6 - 2.4 mg/dL   PHOSPHORUS    Collection Time: 05/20/22  5:40 AM   Result Value Ref Range    Phosphorus 3.2 2.6 - 4.7 mg/dL   GLUCOSE, POC    Collection Time: 05/20/22  5:41 AM   Result Value Ref Range    Glucose (POC) 248 (H) 65 - 117 mg/dL    Performed by SYDNEE PLASENCIA    TYPE & SCREEN    Collection Time: 05/20/22 10:05 AM   Result Value Ref Range    Crossmatch Expiration 05/23/2022,2359     ABO/Rh(D) O Positive     Antibody screen Negative     Unit number [id number]     Blood component type RC LR     Unit division 00     Status of unit Ko to transfuse     Crossmatch result Compatible    PLASMA, ALLOCATE    Collection Time: 05/20/22 10:30 AM   Result Value Ref Range    Unit number [id number]     Blood component type FP 24h,Thaw1     Unit division 00     Status of unit Allocated     TRANSFUSION STATUS Ok to transfuse     Unit number [id number]     Blood component type FP 24H,Thaw     Unit division 00     Status of unit Ko to transfuse      [unfilled]      Review of Systems    Not a good historian e. Physical Exam:      Constitutional: Alert awake not in distress  HENT:   Head: Normocephalic and atraumatic. Eyes: Pupils are equal, round, and reactive to light. EOM are normal.   Cardiovascular: Normal rate, regular rhythm and normal heart sounds. Pulmonary/Chest: Breath sounds normal. No wheezes. No rales. Exhibits no tenderness. Abdominal: Soft. Bowel sounds are normal. There is no abdominal tenderness. There is no rebound and no guarding. Musculoskeletal: Normal range of motion. Neurological: Alert awake bedbound    Skin sacral decubitus ulcer and left foot wound    XR CHEST PORT   Final Result   1. The endotracheal tube is in satisfactory position. 2.  There has been a partial interval resolution in the pulmonary interstitial   edema pattern. XR CHEST PORT   Final Result   Ill-defined haziness in the mid to lower lung zones suspect for mild   atelectatic changes and/or interstitial fluid or pleural fluid. US ABD LTD   Final Result   1. Question pericholecystic fluid. No identified gallstones or sludge. 2. Right pleural effusion.    3. Increased right renal echotexture for medical renal disease. XR CHEST PORT   Final Result   Intubation. Findings suggesting hydrostatic edema. XR CHEST PORT   Final Result   No evidence of an acute cardiopulmonary process.       XR CHEST PORT    (Results Pending)        Recent Results (from the past 24 hour(s))   GLUCOSE, POC    Collection Time: 05/19/22 11:40 AM   Result Value Ref Range    Glucose (POC) 91 65 - 117 mg/dL    Performed by Min Arredondo    PROTHROMBIN TIME + INR    Collection Time: 05/19/22 12:57 PM   Result Value Ref Range    Prothrombin time 21.2 (H) 11.9 - 14.6 sec    INR 1.8 (H) 0.9 - 1.1     CULTURE, RESPIRATORY/SPUTUM/BRONCH W GRAM STAIN    Collection Time: 05/19/22  2:48 PM    Specimen: Fluid; Sputum   Result Value Ref Range    Special Requests: No Special Requests      GRAM STAIN 1+ WBCs seen      GRAM STAIN no epithelial cells seen      GRAM STAIN No organisms seen      Culture result: PENDING    GLUCOSE, POC    Collection Time: 05/19/22  5:03 PM   Result Value Ref Range    Glucose (POC) 134 (H) 65 - 117 mg/dL    Performed by Dena Monahan, RANDOM    Collection Time: 05/19/22  5:20 PM   Result Value Ref Range    Vancomycin, random 24.9 ug/mL   CREATININE    Collection Time: 05/19/22  5:20 PM   Result Value Ref Range    Creatinine 1.40 (H) 0.55 - 1.02 mg/dL   PTT    Collection Time: 05/19/22  5:20 PM   Result Value Ref Range    aPTT 32.2 21.2 - 34.1 sec    aPTT, therapeutic range   82 - 109 sec   FIBRINOGEN    Collection Time: 05/19/22  5:20 PM   Result Value Ref Range    Fibrinogen 741 (H) 220 - 535 mg/dL   GLUCOSE, POC    Collection Time: 05/20/22 12:16 AM   Result Value Ref Range    Glucose (POC) 201 (H) 65 - 117 mg/dL    Performed by Concepcion Zhong    BLOOD GAS, ARTERIAL    Collection Time: 05/20/22  4:28 AM   Result Value Ref Range    pH 7.45 7.35 - 7.45      PCO2 29 (L) 35 - 45 mmHg    PO2 83 75 - 100 mmHg    O2 SAT 98 >95 %    BICARBONATE 21 (L) 22 - 26 mmol/L    BASE DEFICIT 4.0 (H) 0 - 2 mmol/L    O2 METHOD VENT      FIO2 30.0 %    MODE Assist Control/Volume Control      Tidal volume 500      SET RATE 12      EPAP/CPAP/PEEP 5.0      SITE Right Radial      EVELIN'S TEST PASS     PROCALCITONIN    Collection Time: 05/20/22  5:40 AM   Result Value Ref Range    Procalcitonin 1.72 (H) 0 ng/mL   CBC W/O DIFF    Collection Time: 05/20/22  5:40 AM   Result Value Ref Range    WBC 17.2 (H) 3.6 - 11.0 K/uL    RBC 2.45 (L) 3.80 - 5.20 M/uL    HGB 6.8 (L) 11.5 - 16.0 g/dL    HCT 22.0 (L) 35.0 - 47.0 %    MCV 89.8 80.0 - 99.0 FL    MCH 27.8 26.0 - 34.0 PG    MCHC 30.9 30.0 - 36.5 g/dL    RDW 17.3 (H) 11.5 - 14.5 %    PLATELET 479 626 - 826 K/uL    MPV 11.2 8.9 - 12.9 FL    NRBC 5.1 (H) 0.0  WBC    ABSOLUTE NRBC 0.87 (H) 0.00 - 1.37 K/uL   METABOLIC PANEL, COMPREHENSIVE    Collection Time: 05/20/22  5:40 AM   Result Value Ref Range    Sodium 144 136 - 145 mmol/L    Potassium 3.7 3.5 - 5.1 mmol/L    Chloride 118 (H) 97 - 108 mmol/L    CO2 19 (L) 21 - 32 mmol/L    Anion gap 7 5 - 15 mmol/L    Glucose 240 (H) 65 - 100 mg/dL    BUN 55 (H) 6 - 20 mg/dL    Creatinine 1.45 (H) 0.55 - 1.02 mg/dL    BUN/Creatinine ratio 38 (H) 12 - 20      GFR est AA 43 (L) >60 ml/min/1.73m2    GFR est non-AA 35 (L) >60 ml/min/1.73m2    Calcium 8.6 8.5 - 10.1 mg/dL    Bilirubin, total 0.4 0.2 - 1.0 mg/dL    AST (SGOT) 802 (H) 15 - 37 U/L    ALT (SGPT) 1,361 (H) 12 - 78 U/L    Alk.  phosphatase 99 45 - 117 U/L    Protein, total 6.4 6.4 - 8.2 g/dL    Albumin 1.5 (L) 3.5 - 5.0 g/dL    Globulin 4.9 (H) 2.0 - 4.0 g/dL    A-G Ratio 0.3 (L) 1.1 - 2.2     PROTHROMBIN TIME + INR    Collection Time: 05/20/22  5:40 AM   Result Value Ref Range    Prothrombin time 20.9 (H) 11.9 - 14.6 sec    INR 1.8 (H) 0.9 - 1.1     PTT    Collection Time: 05/20/22  5:40 AM   Result Value Ref Range    aPTT 31.7 21.2 - 34.1 sec    aPTT, therapeutic range   82 - 109 sec   C REACTIVE PROTEIN, QT    Collection Time: 05/20/22  5:40 AM   Result Value Ref Range C-Reactive protein 17.10 (H) 0.00 - 0.60 mg/dL   MAGNESIUM    Collection Time: 05/20/22  5:40 AM   Result Value Ref Range    Magnesium 2.4 1.6 - 2.4 mg/dL   PHOSPHORUS    Collection Time: 05/20/22  5:40 AM   Result Value Ref Range    Phosphorus 3.2 2.6 - 4.7 mg/dL   GLUCOSE, POC    Collection Time: 05/20/22  5:41 AM   Result Value Ref Range    Glucose (POC) 248 (H) 65 - 117 mg/dL    Performed by [employer] [address]    Collection Time: 05/20/22 10:05 AM   Result Value Ref Range    Crossmatch Expiration 05/23/2022,2359     ABO/Rh(D) Carly Delano Positive     Antibody screen Negative     Unit number [id number]     Blood component type RC LR     Unit division 00     Status of unit Ko to transfuse     Crossmatch result Compatible    PLASMA, ALLOCATE    Collection Time: 05/20/22 10:30 AM   Result Value Ref Range    Unit number [id number]     Blood component type FP 24h,Thaw1     Unit division 00     Status of unit Allocated     TRANSFUSION STATUS Ok to transfuse     Unit number [id number]     Blood component type FP 24H,Thaw     Unit division 00     Status of unit Ko to transfuse        Results     Procedure Component Value Units Date/Time    CULTURE, RESPIRATORY/SPUTUM/BRONCH Bonnye Mins [[id number]] Collected: 05/19/22 1448    Order Status: Completed Specimen: Sputum from Fluid Updated: 05/20/22 0129     Special Requests: No Special Requests        GRAM STAIN 1+ WBCs seen         no epithelial cells seen         No organisms seen        Culture result: PENDING    CULTURE, Odalis Garcia STAIN [[id number]]  (Susceptibility) Collected: 05/15/22 1915    Order Status: Completed Specimen: Wound from Great Toe Updated: 05/18/22 0835     Special Requests: No Special Requests        GRAM STAIN       3+ Gram Positive Rods (Coryneform)                  1+ apparent Gram Negative Rods           Culture result:       Heavy Acinetobacter baumannii complex Heavy Diphtheroids       Susceptibility      Acinetobacter baumannii complex     GEMA     Ampicillin/sulbactam ($) Susceptible     Cefepime ($$) Susceptible     Ceftazidime ($) Susceptible     Ceftriaxone ($) Intermediate     Ciprofloxacin ($) Resistant     Gentamicin ($) Susceptible     Levofloxacin ($) Resistant     Meropenem ($$) Resistant     Piperacillin/Tazobac ($) Resistant     Tobramycin ($) Susceptible     Trimeth/Sulfa Susceptible                  Linear View                   CULTURE, Melvia Pellet STAIN [[id number]]  (Susceptibility) Collected: 05/14/22 3551    Order Status: Completed Specimen: Wound from Ulcer Updated: 05/18/22 0810     Special Requests: No Special Requests        GRAM STAIN Few WBCs seen         2+ Gram Negative Rods               Occasional Gram Positive Cocci in pairs            Rare Gram Positive Rods        Culture result: Moderate Acinetobacter baumannii complex                  Moderate Enterococcus faecium                  Light >2 organisms - contaminated specimen.  suggest recollection Anaerobic gram negative rods Beta Lactamase Positive          Susceptibility      Acinetobacter baumannii complex     GEMA     Ampicillin/sulbactam ($) Susceptible     Cefepime ($$) Susceptible     Ceftazidime ($) Susceptible     Ceftriaxone ($) Intermediate     Ciprofloxacin ($) Resistant     Gentamicin ($) Susceptible     Levofloxacin ($) Resistant     Meropenem ($$) Resistant     Piperacillin/Tazobac ($) Resistant     Tobramycin ($) Susceptible     Trimeth/Sulfa Susceptible                  Linear View               Susceptibility      Enterococcus faecium     GEMA     Ampicillin ($) Resistant     Daptomycin ($$$$$)  See below  [1]      Linezolid ($$$$$) Susceptible     Vancomycin ($) Susceptible                 [1]  Dose Dependent  (SENSITIVITIES PERFORMED BY E-TEST)          Linear View                   CULTURE, BLOOD, PAIRED [[id number]]  (Abnormal) Collected: 05/14/22 2000    Order Status: Completed Specimen: Blood Updated: 05/17/22 1212     Special Requests: No Special Requests        Culture result:       Staphylococcus species, coagulase negative GROWING IN THE ANAEROBIC BOTTLE. No Site Indicated            (NOTE) PRELIMINARY REPORT OF GRAM POSITIVE COCCI IN CLUSTERS GROWING IN THE ANAEROBIC BOTTLE, CALLED TO AND READ BACK BY CANDI KNOWLES 5/16/22 1118 SCP. Labs:     Recent Labs     05/20/22  0540 05/19/22  0415   WBC 17.2* 13.1*   HGB 6.8* 7.8*   HCT 22.0* 24.0*    314     Recent Labs     05/20/22  0540 05/19/22  1720 05/19/22  0525 05/19/22  0400 05/19/22  0400 05/18/22  1600 05/18/22  0714     --  146*  --  147*   < > 144   K 3.7  --  4.0  --  4.0   < > 5.6*   *  --  117*  --  118*   < > 113*   CO2 19*  --  19*  --  21   < > 22   BUN 55*  --  67*  --  65*   < > 66*   CREA 1.45* 1.40* 1.70*   < > 1.67*   < > 1.55*   *  --  67  --  68   < > 289*   CA 8.6  --  8.8  --  9.2   < > 8.3*   MG 2.4  --   --   --   --   --  2.9*   PHOS 3.2  --   --   --   --   --  3.9    < > = values in this interval not displayed. Recent Labs     05/20/22  0540 05/19/22  0525 05/19/22  0415   ALT 1,361* 1,551* 1,461*   AP 99 91 88   TBILI 0.4 0.5 0.5   TP 6.4 6.6 6.8   ALB 1.5* 1.6* 1.6*   GLOB 4.9* 5.0* 5.2*     Recent Labs     05/20/22  0540 05/19/22  1720 05/19/22  1257 05/19/22  0525   INR 1.8*  --  1.8* 1.9*   PTP 20.9*  --  21.2* 21.5*   APTT 31.7 32.2  --   --       No results for input(s): FE, TIBC, PSAT, FERR in the last 72 hours. No results found for: FOL, RBCF   Recent Labs     05/20/22  0428 05/19/22  0400   PH 7.45 7.51*   PCO2 29* 26*   PO2 83 68*     No results for input(s): CPK, CKNDX, TROIQ in the last 72 hours.     No lab exists for component: CPKMB  Lab Results   Component Value Date/Time    Cholesterol, total 124 03/08/2022 07:40 AM    HDL Cholesterol 30 03/08/2022 07:40 AM    LDL,Direct 79 06/28/2021 12:00 AM    LDL, calculated 44.8 03/08/2022 07:40 AM    Triglyceride 202 (H) 05/16/2022 06:20 AM    CHOL/HDL Ratio 4.1 03/08/2022 07:40 AM     Lab Results   Component Value Date/Time    Glucose (POC) 248 (H) 05/20/2022 05:41 AM    Glucose (POC) 201 (H) 05/20/2022 12:16 AM    Glucose (POC) 134 (H) 05/19/2022 05:03 PM    Glucose (POC) 91 05/19/2022 11:40 AM    Glucose (POC) 88 05/19/2022 09:42 AM     Lab Results   Component Value Date/Time    Color Yellow/Straw 05/14/2022 08:08 PM    Appearance Clear 05/14/2022 08:08 PM    Specific gravity 1.014 05/14/2022 08:08 PM    pH (UA) 6.0 05/14/2022 08:08 PM    Protein 30 (A) 05/14/2022 08:08 PM    Glucose 50 (A) 05/14/2022 08:08 PM    Ketone Negative 05/14/2022 08:08 PM    Bilirubin Negative 05/14/2022 08:08 PM    Urobilinogen 0.1 05/14/2022 08:08 PM    Nitrites Negative 05/14/2022 08:08 PM    Leukocyte Esterase Trace (A) 05/14/2022 08:08 PM    Bacteria Negative 05/14/2022 08:08 PM    Bacteria Rare (A) 05/14/2022 08:08 PM    WBC 0-4 05/14/2022 08:08 PM    WBC 4 05/14/2022 08:08 PM    RBC 0-5 05/14/2022 08:08 PM    RBC 1 05/14/2022 08:08 PM         Assessment:   Acute respiratory failure on ventilator 40% O2  Sacral decubitus ulcer with recent culture growing Pseudomonas rx with  Zosyn and meropenem  Sepsis with leukocytosis elevated procalcitonin and CRP  Left foot wound  Recent removal of left great toe and second toe  CVA with PEG tube placement  History of aspiration pneumonia  History of chronic kidney disease  Hypertension  Hyperlipidemia  Anemia of chronic disease  Hyperkalemia  Static post transfusion  Uncontrolled diabetes  Hepatic shock  Coagulopathy  Elevated  troponin      Plan:     Continue amlodipine 5 mg daily  Aspirin 81 mg daily  Lipitor 20 mg daily discontinue   IV vancomycin and IV Unasyn  Cholestyramine 4 g twice a day  SCD for DVT prophylaxis  Neurontin 200 mg twice a day  Hydralazine 50 mg 3 times a day  Lantus 50 units subcu daily    Add Lantus 10 units in the evening    GI consult for hepatic shock  Hematology consult for coagulopathy  Patient on vancomycin and Fortaz as per ID    Monitor liver function/INR  Transfuse 1 unit packed RBC      Vascular surgery planned for laparoscopic loop colostomy placement and sacral debridement then wound vacuum  Also try to close the wound on the left foot with TMA      Current Facility-Administered Medications:     0.9% sodium chloride infusion 250 mL, 250 mL, IntraVENous, PRN, Martínez Crespo MD    0.9% sodium chloride infusion 250 mL, 250 mL, IntraVENous, PRN, James Quintanilla MD    dextrose 10% infusion 0-250 mL, 0-250 mL, IntraVENous, PRN, Israel Soto MD, 125 mL at 05/19/22 0537    cefTAZidime (FORTAZ) 1 g in 0.9% sodium chloride (MBP/ADV) 50 mL MBP, 1 g, IntraVENous, Q12H, Stacia Monzon MD, 1 g at 05/20/22 1019    phytonadione (vitamin K1) (MEPHYTON) tablet 10 mg, 10 mg, Oral, DAILY, Kourtney Quintanilla MD, 10 mg at 05/20/22 0900    Vancomycin - Draw random level at 1800 on 5/20.  Thanks!, , Other, ONCE, Jonah Soto MD    0.9% sodium chloride infusion 250 mL, 250 mL, IntraVENous, PRN, Concetta Bob MD    insulin glargine (LANTUS) injection 10 Units, 10 Units, SubCUTAneous, QHS, Isarel Soto MD, 10 Units at 05/19/22 2233    0.9% sodium chloride infusion 250 mL, 250 mL, IntraVENous, PRN, Caity Luther MD    0.9% sodium chloride infusion 250 mL, 250 mL, IntraVENous, PRN, Concetta Bob MD    NOREPINephrine (LEVOPHED) 32,000 mcg in dextrose 5% 250 mL (128 mcg/mL) infusion, 2 mcg/min, IntraVENous, Diamond COWAN Aisha Lyell., MD    0.9% sodium chloride infusion 250 mL, 250 mL, IntraVENous, PRN, Concetta Bob MD    propofol (DIPRIVAN) 10 mg/mL infusion, 0-50 mcg/kg/min, IntraVENous, TITRATE, Rachel Fields MD, Last Rate: 11.9 mL/hr at 05/19/22 2106, 25 mcg/kg/min at 05/19/22 2106    sodium zirconium cyclosilicate (LOKELMA) powder packet 10 g, 10 g, Oral, DAILY, Bryanna Louise MD    0.9% sodium chloride infusion 250 mL, 250 mL, IntraVENous, PRN, Fernando Soto MD    sodium hypochlorite (HALF STRENGTH DAKIN'S) 0.25% irrigation (bottle), , Topical, BID, Israel Soto MD, Given at 05/20/22 0851    glucose chewable tablet 16 g, 4 Tablet, Oral, PRN, Fernando Soto MD    glucagon [facility]) injection 1 mg, 1 mg, IntraMUSCular, PRN, Fernando Soto MD    insulin lispro (HUMALOG) injection, , SubCUTAneous, Q6H, Israel Soto MD, 2 Units at 05/20/22 0657    Vancomycin Pharmacy Dosing, , Other, Rx Dosing/Monitoring, Vanessa Amaya, DO    sodium chloride (NS) flush 5-40 mL, 5-40 mL, IntraVENous, Q8H, Orestes Norman MD, 10 mL at 05/20/22 0621    acetaminophen (TYLENOL) tablet 650 mg, 650 mg, Oral, Q6H PRN, 650 mg at 05/18/22 0056 **OR** acetaminophen (TYLENOL) suppository 650 mg, 650 mg, Rectal, Q6H PRN, Severa Boroughs I, MD, 650 mg at 05/16/22 2223    polyethylene glycol (MIRALAX) packet 17 g, 17 g, Oral, DAILY PRN, Severa Boroughs I, MD    ondansetron (ZOFRAN ODT) tablet 4 mg, 4 mg, Oral, Q8H PRN **OR** ondansetron (ZOFRAN) injection 4 mg, 4 mg, IntraVENous, Q6H PRN, Kat Solano MD    [Held by provider] enoxaparin (LOVENOX) injection 40 mg, 40 mg, SubCUTAneous, DAILY, Orestes Norman MD, 40 mg at 05/17/22 1006    famotidine (PEPCID) tablet 20 mg, 20 mg, Oral, BID, Severa Boroughs I, MD, 20 mg at 05/20/22 0841    acidophilus-pectin, citrus tablet 2 Tablet, 2 Tablet, Oral, DAILY, Orestes Norman MD, 2 Tablet at 05/20/22 0841    albuterol-ipratropium (DUO-NEB) 2.5 MG-0.5 MG/3 ML, 3 mL, Nebulization, Q6H PRN, Orestes Norman MD    amLODIPine (NORVASC) tablet 5 mg, 5 mg, Oral, DAILY, Orestes Norman MD, 5 mg at 05/20/22 0841    artificial saliva (MOUTH KOTE) 1 Spray, 1 Spray, Oral, TID, Severa Boroughs I, MD, 1 [place] at 05/18/22 0857    aspirin delayed-release tablet 81 mg, 81 mg, Oral, DAILY, Orestes Norman MD, 81 mg at 05/18/22 0854    brimonidine (ALPHAGAN) 0.2 % ophthalmic solution 1 Drop, 1 Drop, Both Eyes, TID, Dede Winn MD, 1 Drop at 05/20/22 0841    cholestyramine-aspartame (QUESTRAN LIGHT) packet 4 g, 4 g, Oral, BID WITH MEALS, Orestes Norman MD, 4 g at 05/20/22 0840    [Held by provider] fenofibrate nanocrystallized (TRICOR) tablet 48 mg, 48 mg, Oral, DAILY, Orestes Norman MD, 48 mg at 05/19/22 7926    ferrous sulfate tablet 325 mg, 325 mg, Oral, BID, Orestes Norman MD, 325 mg at 05/20/22 0841    gabapentin (NEURONTIN) capsule 300 mg, 300 mg, Oral, BID, Orestes Norman MD, 300 mg at 05/20/22 0841    hydrALAZINE (APRESOLINE) tablet 50 mg, 50 mg, Oral, TID, Zion JACKSON MD, 50 mg at 05/18/22 0854    insulin glargine (LANTUS) injection 50 Units, 50 Units, SubCUTAneous, DAILY, Orestes Norman MD, 50 Units at 05/20/22 0840    latanoprost (XALATAN) 0.005 % ophthalmic solution 1 Drop, 1 Drop, Right Eye, QPM, Dede Winn MD, 1 Drop at 05/19/22 1732    metoprolol tartrate (LOPRESSOR) tablet 50 mg, 50 mg, Oral, BID, Orestes Norman MD, 50 mg at 05/18/22 0855    traMADoL (ULTRAM) tablet 25 mg, 25 mg, Oral, Q12H PRN, Dede Winn MD, 25 mg at 05/18/22 8191

## 2022-05-20 NOTE — PROGRESS NOTES
Progress Note    Patient: Sammie Burgess MRN: [id number]  SSN: [id number]    YOB: 1945  Age: 78 y.o. Sex: female      Admit Date: 5/14/2022    LOS: 6 days     Subjective:     Patient seen and examined. Patient was seen and examined for an elevated troponin. Patient has a past medical history of hypertension, hyperlipidemia, diabetes mellitus, peripheral artery disease, cerebral vascular accident (bedbound). Remains intubated and sedated in the ICU. Hemodynamically stable. No acute cardiac issues. Telemetry Review: No acute events noted in the past 24 hours. Sinus rhythm; heart rate 70s. Review of Symptoms:   Review of Systems   Unable to perform ROS: acuity of condition         Objective:     Vitals:    05/20/22 0400 05/20/22 0500 05/20/22 0600 05/20/22 0700   BP: (!) 113/58 (!) 113/53 (!) 112/52 (!) 116/51   Pulse: 77 74 71 72   Resp: 17 19 13 14   Temp:    99 °F (37.2 °C)   SpO2: 99% 99% 99% 100%   Weight:   85.8 kg (189 lb 2.5 oz)    Height:            Intake and Output:  Current Shift: No intake/output data recorded. Last three shifts: 05/18 1901 - 05/20 0700  In: 1969.1 [I.V.:746.1]  Out: 2370 [Urine:1970; Drains:400]    Physical Exam:   . Michael Schulz Physical Exam  Vitals and nursing note reviewed. Constitutional:       Appearance: She is ill-appearing. Comments: Sedated   Cardiovascular:      Rate and Rhythm: Normal rate and regular rhythm. Pulses: Normal pulses. Heart sounds: Normal heart sounds. Pulmonary:      Effort: Pulmonary effort is normal. No respiratory distress. Breath sounds: Normal breath sounds. No stridor. No wheezing, rhonchi or rales. Abdominal:      General: Abdomen is flat. Bowel sounds are normal. There is no distension. Palpations: Abdomen is soft. Tenderness: There is no abdominal tenderness. There is no rebound. Skin:     General: Skin is warm and dry.    Neurological:      Comments: Sedated   Psychiatric:         Mood and Affect: Mood normal.         Behavior: Behavior normal.           Lab/Data Review: All lab results for the last 24 hours reviewed. Current Facility-Administered Medications:     dextrose 10% infusion 0-250 mL, 0-250 mL, IntraVENous, PRN, Israel Soto MD, 125 mL at 05/19/22 0537    cefTAZidime (FORTAZ) 1 g in 0.9% sodium chloride (MBP/ADV) 50 mL MBP, 1 g, IntraVENous, Q12H, Racquel Kimball MD, 1 g at 05/19/22 2326    phytonadione (vitamin K1) (MEPHYTON) tablet 10 mg, 10 mg, Oral, DAILY, Ulises Quintanilla MD    Vancomycin - Draw random level at 1800 on 5/20.  Thanks!, , Other, ONCE, Yun Soto MD    0.9% sodium chloride infusion 250 mL, 250 mL, IntraVENous, PRN, Yarelis Bob MD    insulin glargine (LANTUS) injection 10 Units, 10 Units, SubCUTAneous, QHS, Israel Soto MD, 10 Units at 05/19/22 2233    0.9% sodium chloride infusion 250 mL, 250 mL, IntraVENous, PRN, Safia Marti MD    0.9% sodium chloride infusion 250 mL, 250 mL, IntraVENous, PRN, Yarelis Bob MD    NOREPINephrine (LEVOPHED) 32,000 mcg in dextrose 5% 250 mL (128 mcg/mL) infusion, 2 mcg/min, IntraVENous, TITRTAMIA, Aleksey Fields MD    0.9% sodium chloride infusion 250 mL, 250 mL, IntraVENous, PRNPaulino Delsa Queen, MD    propofol (DIPRIVAN) 10 mg/mL infusion, 0-50 mcg/kg/min, IntraVENous, TITRATE, Aleksey Fields MD, Last Rate: 11.9 mL/hr at 05/19/22 2106, 25 mcg/kg/min at 05/19/22 2106    sodium zirconium cyclosilicate (LOKELMA) powder packet 10 g, 10 g, Oral, DAILY, Citlaly Louise MD    0.9% sodium chloride infusion 250 mL, 250 mL, IntraVENous, PRN, Israel Soto MD    sodium hypochlorite (HALF STRENGTH DAKIN'S) 0.25% irrigation (bottle), , Topical, BID, Israel Soto MD, Given at 05/19/22 2782    glucose chewable tablet 16 g, 4 Tablet, Oral, PRN, Israel Soto MD    glucagon (GLUCAGEN) injection 1 mg, 1 mg, IntraMUSCular, PRN, Yun Soto MD    insulin lispro (HUMALOG) injection, , SubCUTAneous, Q6H, Israel Soto MD, 2 Units at 05/20/22 2378    Vancomycin Pharmacy Dosing, , Other, Rx Dosing/Monitoring, Kathie Nickerson DO    sodium chloride (NS) flush 5-40 mL, 5-40 mL, IntraVENous, Q8H, Orestes Norman MD, 10 mL at 05/20/22 0621    acetaminophen (TYLENOL) tablet 650 mg, 650 mg, Oral, Q6H PRN, 650 mg at 05/18/22 0056 **OR** acetaminophen (TYLENOL) suppository 650 mg, 650 mg, Rectal, Q6H PRN, Alexander JACKSON MD, 650 mg at 05/16/22 2223    polyethylene glycol (MIRALAX) packet 17 g, 17 g, Oral, DAILY PRN, Alexander JACKSON MD    ondansetron (ZOFRAN ODT) tablet 4 mg, 4 mg, Oral, Q8H PRN **OR** ondansetron (ZOFRAN) injection 4 mg, 4 mg, IntraVENous, Q6H PRN, Padmini Chavis MD    [Held by provider] enoxaparin (LOVENOX) injection 40 mg, 40 mg, SubCUTAneous, DAILY, Orestes Norman MD, 40 mg at 05/17/22 1006    famotidine (PEPCID) tablet 20 mg, 20 mg, Oral, BID, Padmini Chavis MD, 20 mg at 05/19/22 2233    acidophilus-pectin, citrus tablet 2 Tablet, 2 Tablet, Oral, DAILY, Orestes Norman MD, 2 Tablet at 05/19/22 0929    albuterol-ipratropium (DUO-NEB) 2.5 MG-0.5 MG/3 ML, 3 mL, Nebulization, Q6H PRN, Orestes Norman MD    amLODIPine (NORVASC) tablet 5 mg, 5 mg, Oral, DAILY, Orestes Norman MD, 5 mg at 05/19/22 0929    artificial saliva (MOUTH KOTE) 1 Spray, 1 Spray, Oral, TID, Alexander JACKSON MD, 1 [place] at 05/18/22 0857    aspirin delayed-release tablet 81 mg, 81 mg, Oral, DAILY, Orestes Norman MD, 81 mg at 05/18/22 0854    brimonidine (ALPHAGAN) 0.2 % ophthalmic solution 1 Drop, 1 Drop, Both Eyes, TID, Orestes Norman MD, 1 Drop at 05/19/22 2240    cholestyramine-aspartame (QUESTRAN LIGHT) packet 4 g, 4 g, Oral, BID WITH MEALS, Orestes Norman MD, 4 g at 05/19/22 1729    [Held by provider] fenofibrate nanocrystallized (TRICOR) tablet 48 mg, 48 mg, Oral, DAILY, Orestes Norman MD, 48 mg at 05/19/22 0925    ferrous sulfate tablet 325 mg, 325 mg, Oral, BID, Jessy Scott MD, 325 mg at 05/19/22 2233    gabapentin (NEURONTIN) capsule 300 mg, 300 mg, Oral, BID, Orestes Norman MD, 300 mg at 05/19/22 2233    hydrALAZINE (APRESOLINE) tablet 50 mg, 50 mg, Oral, TID, Pamela JACKSON MD, 50 mg at 05/18/22 0854    insulin glargine (LANTUS) injection 50 Units, 50 Units, SubCUTAneous, DAILY, Orestes Norman MD, 25 Units at 05/18/22 0907    latanoprost (XALATAN) 0.005 % ophthalmic solution 1 Drop, 1 Drop, Right Eye, QPM, Jessy Scott MD, 1 Drop at 05/19/22 1732    metoprolol tartrate (LOPRESSOR) tablet 50 mg, 50 mg, Oral, BID, Pamela JACKSON MD, 50 mg at 05/18/22 0855    traMADoL (ULTRAM) tablet 25 mg, 25 mg, Oral, Q12H PRN, Jessy Scott MD, 25 mg at 05/18/22 0056      Assessment:     Active Problems:    Sacral ulcer ([address] (5/14/2022)        Plan:     Case discussed with Collaborating physician Dr. Benny Gonzalez and our recommendations are as follows:     1. Elevated troponin:   - patient is intubated and sedated, unable to fully assess  - HS troponin 890 repeat 800 , elevation in the setting of volume overload and ? Sepsis. - continue asa  - Echo on 4/10/22 showed an EF of 50-55%, with no wall motion, moderate TR, mild MR.   - Repeat limited 5/18/22 with EF 20-25%, severe global hypokinesis, Moderate TR/DC. - will continue with conservative management     2. Hypertension:   - blood pressure acceptable  - continue with current medications     3. Hyperlipidemia:   - hold statin due to elevated LFTs     2. Sepsis:   - WBC 17 source likely infected sacral wound  - surgery consulted and deferred surgery at this time. Thank you for involving us in the care of this patient. Please do not hesitate to call if additional questions arise. If after hours please call [phone].     Signed By: Alesia Light NP     May 20, 2022

## 2022-05-20 NOTE — PROGRESS NOTES
Consult Date: 5/20/2022      Subjective   HISTORY OF PRESENTING ILLNESS   Seen and examined in ICU. Intubated and sedated. Daughter by bedside. Past Medical History:   Diagnosis Date    Anemia     Chronic kidney disease     Diabetes mellitus ([facility]     Hemiplegia affecting dominant side, post-stroke ([facility] 05/04/2022    HTN (hypertension)     Hyperkalemia     Hyperlipidemia     Ill-defined condition     Neuropathy     PAD (peripheral artery disease) (HCC)     PCI    Sciatica     Stroke ([facility]     UTI (urinary tract infection)       Past Surgical History:   Procedure Laterality Date    HX AMPUTATION Right     great toe    HX CERVICAL FUSION      HX GI      HX OTHER SURGICAL Bilateral     Stent placement    HX OTHER SURGICAL      HX VASCULAR STENT Bilateral     2 in right 1 in left    HX VASCULAR STENT Bilateral      Family History   Problem Relation Age of Onset    Cancer Mother     Diabetes Mother     Hypertension Mother       Social History     Tobacco Use    Smoking status: Never Smoker    Smokeless tobacco: Never Used   Substance Use Topics    Alcohol use: Not Currently       Current Facility-Administered Medications   Medication Dose Route Frequency Provider Last Rate Last Admin    0.9% sodium chloride infusion 250 mL  250 mL IntraVENous PRN Mickey Crespo MD        0.9% sodium chloride infusion 250 mL  250 mL IntraVENous PRN Kedar Quintanilla MD        ampicillin-sulbactam (UNASYN) 3 g in 0.9% sodium chloride (MBP/ADV) 100 mL MBP  3 g IntraVENous Q8H Harris Monzon MD        dextrose 10% infusion 0-250 mL  0-250 mL IntraVENous PRN Israel Soto MD   125 mL at 05/19/22 0537    phytonadione (vitamin K1) (MEPHYTON) tablet 10 mg  10 mg Oral DAILY Kourtney Quintanilla MD   10 mg at 05/20/22 0900    Vancomycin - Draw random level at 1800 on 5/20. Thanks!    Other Robert Bellamy MD        0.9% sodium chloride infusion 250 mL  250 mL IntraVENous PRN Gina Bob MD  insulin glargine (LANTUS) injection 10 Units  10 Units SubCUTAneous QHS Israel Soto MD   10 Units at 05/19/22 2233    0.9% sodium chloride infusion 250 mL  250 mL IntraVENous PRN Regina Fields MD        0.9% sodium chloride infusion 250 mL  250 mL IntraVENous PRN Mouna Bob MD        NOREPINephrine (LEVOPHED) 32,000 mcg in dextrose 5% 250 mL (128 mcg/mL) infusion  2 mcg/min IntraVENous TITRATE Regina Fields MD        0.9% sodium chloride infusion 250 mL  250 mL IntraVENous PRN Mouna Bob MD        propofol (DIPRIVAN) 10 mg/mL infusion  0-50 mcg/kg/min IntraVENous TITRATE Regina Fields MD 11.9 mL/hr at 05/19/22 2106 25 mcg/kg/min at 05/19/22 2106    sodium zirconium cyclosilicate (LOKELMA) powder packet 10 g  10 g Oral DAILY Yvonne Louise MD        0.9% sodium chloride infusion 250 mL  250 mL IntraVENous PRN Nancy Soto MD        sodium hypochlorite (HALF STRENGTH DAKIN'S) 0.25% irrigation (bottle)   Topical BID Dusty Garcia MD   Given at 05/20/22 8423    glucose chewable tablet 16 g  4 Tablet Oral PRN Nancy Soto MD        glucagon (GLUCAGEN) injection 1 mg  1 mg IntraMUSCular PRN Nancy Soto MD       [place] insulin lispro (HUMALOG) injection   SubCUTAneous Q6H Israel Soto MD   3 Units at 05/20/22 1227    Vancomycin Pharmacy Dosing   Other Rx Dosing/Monitoring Karie Lee DO        sodium chloride (NS) flush 5-40 mL  5-40 mL IntraVENous Q8H Dominique JACKSON MD   10 mL at 05/20/22 7104    acetaminophen (TYLENOL) tablet 650 mg  650 mg Oral Q6H PRN Dominique JACKSON MD   650 mg at 05/18/22 0056    Or    acetaminophen (TYLENOL) suppository 650 mg  650 mg Rectal Q6H PRN Dominique JACKSON MD   650 mg at 05/16/22 2223    polyethylene glycol (MIRALAX) packet 17 g  17 g Oral DAILY PRN Dominique JACKSON MD        ondansetron (ZOFRAN ODT) tablet 4 mg  4 mg Oral Q8H PRN Dominique JACKSON MD        Or    ondansetron [facility] injection 4 mg  4 mg IntraVENous Q6H PRN Jesus Marin MD        [Held by provider] enoxaparin (LOVENOX) injection 40 mg  40 mg SubCUTAneous DAILY Radha JACKSON MD   40 mg at 05/17/22 1006    famotidine (PEPCID) tablet 20 mg  20 mg Oral BID Radha JACKSON MD   20 mg at 05/20/22 0841    acidophilus-pectin, citrus tablet 2 Tablet  2 Tablet Oral DAILY Radha JACKSON MD   2 Tablet at 05/20/22 0841    albuterol-ipratropium (DUO-NEB) 2.5 MG-0.5 MG/3 ML  3 mL Nebulization Q6H PRN Radha JACKSON MD        amLODIPine (NORVASC) tablet 5 mg  5 mg Oral DAILY Radha JACKSON MD   5 mg at 05/20/22 0841    artificial saliva (MOUTH KOTE) 1 Spray  1 [place] Oral TID Jesus Marin MD   1 [place] at 05/18/22 0857    aspirin delayed-release tablet 81 mg  81 mg Oral DAILY Radha JACKSON MD   81 mg at 05/18/22 0854    brimonidine (ALPHAGAN) 0.2 % ophthalmic solution 1 Drop  1 Drop Both Eyes TID Jesus Marin MD   1 Drop at 05/20/22 0841    cholestyramine-aspartame (QUESTRAN LIGHT) packet 4 g  4 g Oral BID WITH MEALS Radha JACKSON MD   4 g at 05/20/22 0840    [Held by provider] fenofibrate nanocrystallized (TRICOR) tablet 48 mg  48 mg Oral DAILY Radha JACKSON MD   48 mg at 05/19/22 9043    ferrous sulfate tablet 325 mg  325 mg Oral BID Radha JACKSON MD   325 mg at 05/20/22 0841    gabapentin (NEURONTIN) capsule 300 mg  300 mg Oral BID Radha JACKSON MD   300 mg at 05/20/22 0841    hydrALAZINE (APRESOLINE) tablet 50 mg  50 mg Oral TID Radha JACKSON MD   50 mg at 05/18/22 0854    insulin glargine (LANTUS) injection 50 Units  50 Units SubCUTAneous DAILY Jesus Marin MD   50 Units at 05/20/22 0840    latanoprost (XALATAN) 0.005 % ophthalmic solution 1 Drop  1 Drop Right Eye QPM Jesus Marin MD   1 Drop at 05/19/22 1732    metoprolol tartrate (LOPRESSOR) tablet 50 mg  50 mg Oral BID Radha JACKSON MD   50 mg at 05/18/22 0855    traMADoL (ULTRAM) tablet 25 mg  25 mg Oral Q12H PRN Broderick Mack MD   25 mg at 05/18/22 8984        Review of Systems   Unable to perform ROS: Dementia       Objective     Vital signs for last 24 hours:  Visit Vitals  BP (!) 113/53   Pulse 63   Temp 98.9 °F (37.2 °C)   Resp 12   Ht 5' 7.01\" (1.702 m)   Wt 85.8 kg (189 lb 2.5 oz)   SpO2 100%   Breastfeeding No   BMI 29.62 kg/m²           Recent Results (from the past 24 hour(s))   PROTHROMBIN TIME + INR    Collection Time: 05/19/22 12:57 PM   Result Value Ref Range    Prothrombin time 21.2 (H) 11.9 - 14.6 sec    INR 1.8 (H) 0.9 - 1.1     CULTURE, RESPIRATORY/SPUTUM/BRONCH W GRAM STAIN    Collection Time: 05/19/22  2:48 PM    Specimen: Fluid; Sputum   Result Value Ref Range    Special Requests: No Special Requests      GRAM STAIN 1+ WBCs seen      GRAM STAIN no epithelial cells seen      GRAM STAIN No organisms seen      Culture result: PENDING    GLUCOSE, POC    Collection Time: 05/19/22  5:03 PM   Result Value Ref Range    Glucose (POC) 134 (H) 65 - 117 mg/dL    Performed by Ewelina Dai, RANDOM    Collection Time: 05/19/22  5:20 PM   Result Value Ref Range    Vancomycin, random 24.9 ug/mL   CREATININE    Collection Time: 05/19/22  5:20 PM   Result Value Ref Range    Creatinine 1.40 (H) 0.55 - 1.02 mg/dL   PTT    Collection Time: 05/19/22  5:20 PM   Result Value Ref Range    aPTT 32.2 21.2 - 34.1 sec    aPTT, therapeutic range   82 - 109 sec   FIBRINOGEN    Collection Time: 05/19/22  5:20 PM   Result Value Ref Range    Fibrinogen 741 (H) 220 - 535 mg/dL   GLUCOSE, POC    Collection Time: 05/20/22 12:16 AM   Result Value Ref Range    Glucose (POC) 201 (H) 65 - 117 mg/dL    Performed by Arjun Meléndez    BLOOD GAS, ARTERIAL    Collection Time: 05/20/22  4:28 AM   Result Value Ref Range    pH 7.45 7.35 - 7.45      PCO2 29 (L) 35 - 45 mmHg    PO2 83 75 - 100 mmHg    O2 SAT 98 >95 %    BICARBONATE 21 (L) 22 - 26 mmol/L    BASE DEFICIT 4.0 (H) 0 - 2 mmol/L    O2 METHOD VENT      FIO2 30.0 %    MODE Assist Control/Volume Control      Tidal volume 500      SET RATE 12      EPAP/CPAP/PEEP 5.0      SITE Right Radial      EVELIN'S TEST PASS     PROCALCITONIN    Collection Time: 05/20/22  5:40 AM   Result Value Ref Range    Procalcitonin 1.72 (H) 0 ng/mL   CBC W/O DIFF    Collection Time: 05/20/22  5:40 AM   Result Value Ref Range    WBC 17.2 (H) 3.6 - 11.0 K/uL    RBC 2.45 (L) 3.80 - 5.20 M/uL    HGB 6.8 (L) 11.5 - 16.0 g/dL    HCT 22.0 (L) 35.0 - 47.0 %    MCV 89.8 80.0 - 99.0 FL    MCH 27.8 26.0 - 34.0 PG    MCHC 30.9 30.0 - 36.5 g/dL    RDW 17.3 (H) 11.5 - 14.5 %    PLATELET 247 310 - 390 K/uL    MPV 11.2 8.9 - 12.9 FL    NRBC 5.1 (H) 0.0  WBC    ABSOLUTE NRBC 0.87 (H) 0.00 - 3.18 K/uL   METABOLIC PANEL, COMPREHENSIVE    Collection Time: 05/20/22  5:40 AM   Result Value Ref Range    Sodium 144 136 - 145 mmol/L    Potassium 3.7 3.5 - 5.1 mmol/L    Chloride 118 (H) 97 - 108 mmol/L    CO2 19 (L) 21 - 32 mmol/L    Anion gap 7 5 - 15 mmol/L    Glucose 240 (H) 65 - 100 mg/dL    BUN 55 (H) 6 - 20 mg/dL    Creatinine 1.45 (H) 0.55 - 1.02 mg/dL    BUN/Creatinine ratio 38 (H) 12 - 20      GFR est AA 43 (L) >60 ml/min/1.73m2    GFR est non-AA 35 (L) >60 ml/min/1.73m2    Calcium 8.6 8.5 - 10.1 mg/dL    Bilirubin, total 0.4 0.2 - 1.0 mg/dL    AST (SGOT) 802 (H) 15 - 37 U/L    ALT (SGPT) 1,361 (H) 12 - 78 U/L    Alk.  phosphatase 99 45 - 117 U/L    Protein, total 6.4 6.4 - 8.2 g/dL    Albumin 1.5 (L) 3.5 - 5.0 g/dL    Globulin 4.9 (H) 2.0 - 4.0 g/dL    A-G Ratio 0.3 (L) 1.1 - 2.2     PROTHROMBIN TIME + INR    Collection Time: 05/20/22  5:40 AM   Result Value Ref Range    Prothrombin time 20.9 (H) 11.9 - 14.6 sec    INR 1.8 (H) 0.9 - 1.1     PTT    Collection Time: 05/20/22  5:40 AM   Result Value Ref Range    aPTT 31.7 21.2 - 34.1 sec    aPTT, therapeutic range   82 - 109 sec   C REACTIVE PROTEIN, QT    Collection Time: 05/20/22  5:40 AM   Result Value Ref Range    C-Reactive protein 17.10 (H) 0.00 - 0.60 mg/dL MAGNESIUM    Collection Time: 05/20/22  5:40 AM   Result Value Ref Range    Magnesium 2.4 1.6 - 2.4 mg/dL   PHOSPHORUS    Collection Time: 05/20/22  5:40 AM   Result Value Ref Range    Phosphorus 3.2 2.6 - 4.7 mg/dL   GLUCOSE, POC    Collection Time: 05/20/22  5:41 AM   Result Value Ref Range    Glucose (POC) 248 (H) 65 - 117 mg/dL    Performed by [facility]    Collection Time: 05/20/22 10:05 AM   Result Value Ref Range    Crossmatch Expiration 05/23/2022,2359     ABO/Rh(D) Cade Divya Positive     Antibody screen Negative     Unit number [id number]     Blood component type RC LR     Unit division 00     Status of unit [address] to transfuse     Crossmatch result Compatible    PLASMA, ALLOCATE    Collection Time: 05/20/22 10:30 AM   Result Value Ref Range    Unit number [id number]     Blood component type FP 24h,Thaw1     Unit division 00     Status of unit [place] to transfuse     Unit number [id number]     Blood component type FP 24H,Thaw     Unit division 00     Status of unit [place] to transfuse    GLUCOSE, POC    Collection Time: 05/20/22 11:06 AM   Result Value Ref Range    Glucose (POC) 253 (H) 65 - 117 mg/dL    Performed by Maree Babinski           Intake/Output Summary (Last 24 hours) at 5/20/2022 1230  Last data filed at 5/20/2022 0600  Gross per 24 hour   Intake 1435.03 ml   Output 1330 ml   Net 105.03 ml      Current Shift: No intake/output data recorded. Last 3 Shifts: 05/18 1901 - 05/20 0700  In: 1969.1 [I.V.:746.1]  Out: 2370 [Urine:1970; Drains:400]  Physical Exam  Constitutional:       Appearance: She is obese. She is ill-appearing. HENT:      Head: Normocephalic and atraumatic. Mouth/Throat:      Mouth: Mucous membranes are moist.   Cardiovascular:      Rate and Rhythm: Regular rhythm. Tachycardia present.    Pulmonary:      Effort: Pulmonary effort is normal.   Skin:     Coloration: Skin is not jaundiced. Findings: No bruising.      Intubated and sedated  Bell  Rectal tube  Blood in rectal bag      Data Review:   Recent Results (from the past 24 hour(s))   PROTHROMBIN TIME + INR    Collection Time: 05/19/22 12:57 PM   Result Value Ref Range    Prothrombin time 21.2 (H) 11.9 - 14.6 sec    INR 1.8 (H) 0.9 - 1.1     CULTURE, RESPIRATORY/SPUTUM/BRONCH W GRAM STAIN    Collection Time: 05/19/22  2:48 PM    Specimen: Fluid; Sputum   Result Value Ref Range    Special Requests: No Special Requests      GRAM STAIN 1+ WBCs seen      GRAM STAIN no epithelial cells seen      GRAM STAIN No organisms seen      Culture result: PENDING    GLUCOSE, POC    Collection Time: 05/19/22  5:03 PM   Result Value Ref Range    Glucose (POC) 134 (H) 65 - 117 mg/dL    Performed by Karlene Dominique, RANDOM    Collection Time: 05/19/22  5:20 PM   Result Value Ref Range    Vancomycin, random 24.9 ug/mL   CREATININE    Collection Time: 05/19/22  5:20 PM   Result Value Ref Range    Creatinine 1.40 (H) 0.55 - 1.02 mg/dL   PTT    Collection Time: 05/19/22  5:20 PM   Result Value Ref Range    aPTT 32.2 21.2 - 34.1 sec    aPTT, therapeutic range   82 - 109 sec   FIBRINOGEN    Collection Time: 05/19/22  5:20 PM   Result Value Ref Range    Fibrinogen 741 (H) 220 - 535 mg/dL   GLUCOSE, POC    Collection Time: 05/20/22 12:16 AM   Result Value Ref Range    Glucose (POC) 201 (H) 65 - 117 mg/dL    Performed by Prashant Zaldivar    BLOOD GAS, ARTERIAL    Collection Time: 05/20/22  4:28 AM   Result Value Ref Range    pH 7.45 7.35 - 7.45      PCO2 29 (L) 35 - 45 mmHg    PO2 83 75 - 100 mmHg    O2 SAT 98 >95 %    BICARBONATE 21 (L) 22 - 26 mmol/L    BASE DEFICIT 4.0 (H) 0 - 2 mmol/L    O2 METHOD VENT      FIO2 30.0 %    MODE Assist Control/Volume Control      Tidal volume 500      SET RATE 12      EPAP/CPAP/PEEP 5.0      SITE Right Radial      EVELIN'S TEST PASS     PROCALCITONIN    Collection Time: 05/20/22  5:40 AM   Result Value Ref Range    Procalcitonin 1.72 (H) 0 ng/mL   CBC W/O DIFF    Collection Time: 05/20/22  5:40 AM   Result Value Ref Range    WBC 17.2 (H) 3.6 - 11.0 K/uL    RBC 2.45 (L) 3.80 - 5.20 M/uL    HGB 6.8 (L) 11.5 - 16.0 g/dL    HCT 22.0 (L) 35.0 - 47.0 %    MCV 89.8 80.0 - 99.0 FL    MCH 27.8 26.0 - 34.0 PG    MCHC 30.9 30.0 - 36.5 g/dL    RDW 17.3 (H) 11.5 - 14.5 %    PLATELET 284 571 - 649 K/uL    MPV 11.2 8.9 - 12.9 FL    NRBC 5.1 (H) 0.0  WBC    ABSOLUTE NRBC 0.87 (H) 0.00 - 5.13 K/uL   METABOLIC PANEL, COMPREHENSIVE    Collection Time: 05/20/22  5:40 AM   Result Value Ref Range    Sodium 144 136 - 145 mmol/L    Potassium 3.7 3.5 - 5.1 mmol/L    Chloride 118 (H) 97 - 108 mmol/L    CO2 19 (L) 21 - 32 mmol/L    Anion gap 7 5 - 15 mmol/L    Glucose 240 (H) 65 - 100 mg/dL    BUN 55 (H) 6 - 20 mg/dL    Creatinine 1.45 (H) 0.55 - 1.02 mg/dL    BUN/Creatinine ratio 38 (H) 12 - 20      GFR est AA 43 (L) >60 ml/min/1.73m2    GFR est non-AA 35 (L) >60 ml/min/1.73m2    Calcium 8.6 8.5 - 10.1 mg/dL    Bilirubin, total 0.4 0.2 - 1.0 mg/dL    AST (SGOT) 802 (H) 15 - 37 U/L    ALT (SGPT) 1,361 (H) 12 - 78 U/L    Alk.  phosphatase 99 45 - 117 U/L    Protein, total 6.4 6.4 - 8.2 g/dL    Albumin 1.5 (L) 3.5 - 5.0 g/dL    Globulin 4.9 (H) 2.0 - 4.0 g/dL    A-G Ratio 0.3 (L) 1.1 - 2.2     PROTHROMBIN TIME + INR    Collection Time: 05/20/22  5:40 AM   Result Value Ref Range    Prothrombin time 20.9 (H) 11.9 - 14.6 sec    INR 1.8 (H) 0.9 - 1.1     PTT    Collection Time: 05/20/22  5:40 AM   Result Value Ref Range    aPTT 31.7 21.2 - 34.1 sec    aPTT, therapeutic range   82 - 109 sec   C REACTIVE PROTEIN, QT    Collection Time: 05/20/22  5:40 AM   Result Value Ref Range    C-Reactive protein 17.10 (H) 0.00 - 0.60 mg/dL   MAGNESIUM    Collection Time: 05/20/22  5:40 AM   Result Value Ref Range    Magnesium 2.4 1.6 - 2.4 mg/dL   PHOSPHORUS    Collection Time: 05/20/22  5:40 AM   Result Value Ref Range    Phosphorus 3.2 2.6 - 4.7 mg/dL GLUCOSE, POC    Collection Time: 05/20/22  5:41 AM   Result Value Ref Range    Glucose (POC) 248 (H) 65 - 117 mg/dL    Performed by [address]    Collection Time: 05/20/22 10:05 AM   Result Value Ref Range    Crossmatch Expiration 05/23/2022,2359     ABO/Rh(D) Vaughn Lis Positive     Antibody screen Negative     Unit number [id number]     Blood component type RC LR     Unit division 00     Status of unit [address] to transfuse     Crossmatch result Compatible    PLASMA, ALLOCATE    Collection Time: 05/20/22 10:30 AM   Result Value Ref Range    Unit number [id number]     Blood component type FP 24h,Thaw1     Unit division 00     Status of unit [place] to transfuse     Unit number [id number]     Blood component type FP 24H,Thaw     Unit division 00     Status of unit [place] to transfuse    GLUCOSE, POC    Collection Time: 05/20/22 11:06 AM   Result Value Ref Range    Glucose (POC) 253 (H) 65 - 117 mg/dL    Performed by Rachell Dan          XR CHEST PORT   Final Result   1. The endotracheal tube is in satisfactory position. 2.  There has been a partial interval resolution in the pulmonary interstitial   edema pattern. XR CHEST PORT   Final Result   Ill-defined haziness in the mid to lower lung zones suspect for mild   atelectatic changes and/or interstitial fluid or pleural fluid. US ABD LTD   Final Result   1. Question pericholecystic fluid. No identified gallstones or sludge. 2. Right pleural effusion. 3. Increased right renal echotexture for medical renal disease. XR CHEST PORT   Final Result   Intubation. Findings suggesting hydrostatic edema. XR CHEST PORT   Final Result   No evidence of an acute cardiopulmonary process.       XR CHEST PORT    (Results Pending)        Patient Active Problem List   Diagnosis Code    HTN (hypertension) I10    Hyperlipidemia E78.5    Neuropathy G62.9    Sciatica M54.30    Diabetes mellitus with neurological manifestations, uncontrolled [id number]    Infection of nailbed of toe of left foot L03.032    PAD [facility]peripheral artery disease) ([facility]) I73.9    Hypercalcemia E83.52    DM type 2 causing vascular disease (HCC) E11.59    Type 2 diabetes mellitus with nephropathy ([facility] E11.21    Acute osteomyelitis of ankle or foot ([address] M86.179    Diabetic peripheral neuropathy ([address] E11.42    Spinal stenosis in cervical region M48.02    Ischemia of both lower extremities I99.8    Pain in both lower legs M79.661, M79.662    CVA (cerebral vascular accident) ([address] I63.9    Elevated troponin R77.8    Hyperkalemia E87.5    UTI (urinary tract infection) N39.0    Sepsis (HCC) A41.9    RICARDO (acute kidney injury) ([address] N17.9    AMS (altered mental status) R41.82    Sacral ulcer ([facility] L98.429        DIAGNOSES:  1. RICARDO, resolved  2. Hyperkalemia, mild  3. Chronic kidney disease  4. Sacral decubitus  5. CVA/dementia  6. Acute on chronic anemia-blood loss  7. Liver failure-shock liver         PLAN:  Agree with transfusions and pressor support. Renal function is stable for now. Overall prognosis is very poor. Thanks for consulting me. Please don't hesitate to contact me if any questions arise of if I can assist in any manner. This dictation was done by dragon, computer voice recognition software. Often unanticipated grammatical, syntax, phones and other interpretive errors are inadvertently transcribed. Please excuse errors that have escaped final proofreading. Please contact me if you suspect dictation or transcription errors.   Dr Shikha Poole  [address], [address] [facility], [address] [facility]  Cell Phone: [phone]  Office phone: [phone]  Fax: [phone]

## 2022-05-20 NOTE — PROGRESS NOTES
Surgery critical care Progress Note     Subjective:   Patient examined in ICU. Hospital Course:  Patient currently intubated and sedated. Liver enzymes trending down. Subjective:   Unable to assess        Review of Systems  Unable to assess     Objective:      Visit Vitals  BP (!) 112/52   Pulse 71   Temp 98.9 °F (37.2 °C)   Resp 13   Ht 5' 7.01\" (1.702 m)   Wt 189 lb 2.5 oz (85.8 kg)   SpO2 99%   Breastfeeding No   BMI 29.62 kg/m²       Patient is intubated  Head and neck atraumatic, normocephalic. ENT: No hoarse voice  Cardiac system regular rate rhythm. Pulmonary no audible wheeze  Chest wall excursion normal with respiration cycle  Abdomen is soft not particularly distended. Neurologically nonfocal.  Skin is warm and moist.  Psychosocial: Unable to assess  Vascular examination as previously noted no changes. Data Review  Reviewed           Assessment / Plan:  Patient's liver enzymes trending down as expected. And the renal function is also improving as well. We will await liver enzymes completely normal and we will proceed with diverting loop colostomy, sacral wound debridement and the closed wound on the left foot with a TMA. I have updated family about this. Critical care time spent 30minutes involving direct patient care as well as reviewing patient's labs and coordination of care with nursing staff     Care Plan discussed with: Patient/Family/RN/Case Management           Total time spent with patient: 30 minutes.

## 2022-05-20 NOTE — PROGRESS NOTES
Patient has been accepted to [facility] and [facility]. Will notify daughter's of acceptance.             Rosalina DOV Bunn

## 2022-05-20 NOTE — PROGRESS NOTES
Hematology Oncology Progress Note     Interval History :  Patient remains intubated and sedated not much responsive. Her daughter is at bedside. Hemoglobin dropped to 6.8 g/dL despite transfusions yesterday. Flexi-Seal came out and some blood per rectum is noted by RN. Central line site shows no bleeding and Bell catheter is clear. She has leukocytosis White blood count 17 K today. Subjective:         Current Facility-Administered Medications   Medication Dose Route Frequency    0.9% sodium chloride infusion 250 mL  250 mL IntraVENous PRN    0.9% sodium chloride infusion 250 mL  250 mL IntraVENous PRN    dextrose 10% infusion 0-250 mL  0-250 mL IntraVENous PRN    cefTAZidime (FORTAZ) 1 g in 0.9% sodium chloride (MBP/ADV) 50 mL MBP  1 g IntraVENous Q12H    phytonadione (vitamin K1) (MEPHYTON) tablet 10 mg  10 mg Oral DAILY    Vancomycin - Draw random level at 1800 on 5/20. Thanks!    Other ONCE    0.9% sodium chloride infusion 250 mL  250 mL IntraVENous PRN    insulin glargine (LANTUS) injection 10 Units  10 Units SubCUTAneous QHS    0.9% sodium chloride infusion 250 mL  250 mL IntraVENous PRN    0.9% sodium chloride infusion 250 mL  250 mL IntraVENous PRN    NOREPINephrine (LEVOPHED) 32,000 mcg in dextrose 5% 250 mL (128 mcg/mL) infusion  2 mcg/min IntraVENous TITRATE    0.9% sodium chloride infusion 250 mL  250 mL IntraVENous PRN    propofol (DIPRIVAN) 10 mg/mL infusion  0-50 mcg/kg/min IntraVENous TITRATE    sodium zirconium cyclosilicate (LOKELMA) powder packet 10 g  10 g Oral DAILY    0.9% sodium chloride infusion 250 mL  250 mL IntraVENous PRN    sodium hypochlorite (HALF STRENGTH DAKIN'S) 0.25% irrigation (bottle)   Topical BID    glucose chewable tablet 16 g  4 Tablet Oral PRN    glucagon (GLUCAGEN) injection 1 mg  1 mg IntraMUSCular PRN    insulin lispro (HUMALOG) injection   SubCUTAneous Q6H    Vancomycin Pharmacy Dosing   Other Rx Dosing/Monitoring    sodium chloride (NS) flush 5-40 mL  5-40 mL IntraVENous Q8H    acetaminophen (TYLENOL) tablet 650 mg  650 mg Oral Q6H PRN    Or    acetaminophen (TYLENOL) suppository 650 mg  650 mg Rectal Q6H PRN    polyethylene glycol (MIRALAX) packet 17 g  17 g Oral DAILY PRN    ondansetron (ZOFRAN ODT) tablet 4 mg  4 mg Oral Q8H PRN    Or    ondansetron (ZOFRAN) injection 4 mg  4 mg IntraVENous Q6H PRN    [Held by provider] enoxaparin (LOVENOX) injection 40 mg  40 mg SubCUTAneous DAILY    famotidine (PEPCID) tablet 20 mg  20 mg Oral BID    acidophilus-pectin, citrus tablet 2 Tablet  2 Tablet Oral DAILY    albuterol-ipratropium (DUO-NEB) 2.5 MG-0.5 MG/3 ML  3 mL Nebulization Q6H PRN    amLODIPine (NORVASC) tablet 5 mg  5 mg Oral DAILY    artificial saliva (MOUTH KOTE) 1 Spray  1 Spray Oral TID    aspirin delayed-release tablet 81 mg  81 mg Oral DAILY    brimonidine (ALPHAGAN) 0.2 % ophthalmic solution 1 Drop  1 Drop Both Eyes TID    cholestyramine-aspartame (QUESTRAN LIGHT) packet 4 g  4 g Oral BID WITH MEALS    [Held by provider] fenofibrate nanocrystallized (TRICOR) tablet 48 mg  48 mg Oral DAILY    ferrous sulfate tablet 325 mg  325 mg Oral BID    gabapentin (NEURONTIN) capsule 300 mg  300 mg Oral BID    hydrALAZINE (APRESOLINE) tablet 50 mg  50 mg Oral TID    insulin glargine (LANTUS) injection 50 Units  50 Units SubCUTAneous DAILY    latanoprost (XALATAN) 0.005 % ophthalmic solution 1 Drop  1 Drop Right Eye QPM    metoprolol tartrate (LOPRESSOR) tablet 50 mg  50 mg Oral BID    traMADoL (ULTRAM) tablet 25 mg  25 mg Oral Q12H PRN        Review of Systems:  Review of systems not obtainable patient is intubated on vent support.     Objective:     Patient Vitals for the past 8 hrs:   BP Temp Pulse Resp SpO2 Weight   05/20/22 0900 -- -- -- -- 100 % --   05/20/22 0820 -- -- 72 23 100 % --   05/20/22 0700 (!) 116/51 99 °F (37.2 °C) 72 14 100 % --   05/20/22 0600 (!) 112/52 -- 71 13 99 % 85.8 kg (189 lb 2.5 oz)   05/20/22 0500 (!) 113/53 -- 74 19 99 % --   22 0400 (!) 113/58 -- 77 17 99 % --   22 0300 (!) 118/57 98.9 °F (37.2 °C) 69 13 100 % --       Temp (24hrs), Av.3 °F (36.8 °C), Min:97.4 °F (36.3 °C), Max:99 °F (37.2 °C)      Physical Exam:    Constitutional  -American female, elderly currently intubated and sedated. Appears close to chronological age. Well nourished. Well developed. Head Normocephalic; no scars   Eyes Conjunctivae and sclerae are clear and without icterus. Pupils are reactive and equal.   ENMT  ET tube is present   Neck Supple without masses or thyromegaly. No jugular venous distension. Hematologic/Lymphatic No petechiae or purpura. No tender or palpable lymph nodes in the cervical, supraclavicular, axillary or inguinal area. Respiratory Lungs are clear to auscultation without rhonchi or wheezing. Cardiovascular Regular rate and rhythm of heart without murmurs, gallops or rubs. Chest / Line Site Chest is symmetric with no chest wall deformities. Abdomen  abdomen is soft nontender PEG tube is present no bleeding at the site . Musculoskeletal No tenderness or swelling, normal range of motion without obvious weakness. Extremities  she has peripheral vascular disease changes with amputated toes and dressing on her feet and ankles   Skin No rashes, scars, or lesions suggestive of malignancy. No petechiae, purpura, or ecchymoses. No excoriations.     Neurologic  not responsive at this time due to sedation   Psychiatric      Lab/Data Review:  Recent Labs     22  0540 22  0415 22  1600   WBC 17.2* 13.1* 13.9*   HGB 6.8* 7.8* 8.1*   HCT 22.0* 24.0* 25.3*    314 368     Recent Labs     22  0540 22  1720 22  1257 22  0525 22  0415 22  0400 22  0400 22  1115 22  0714     --   --  146*  --   --  147*   < > 144   K 3.7  --   --  4.0  --   --  4.0   < > 5.6*   *  --   --  117*  --   --  118*   < > 113* CO2 19*  --   --  19*  --   --  21   < > 22   *  --   --  67  --   --  68   < > 289*   BUN 55*  --   --  67*  --   --  65*   < > 66*   CREA 1.45* 1.40*  --  1.70*  --    < > 1.67*   < > 1.55*   CA 8.6  --   --  8.8  --   --  9.2   < > 8.3*   MG 2.4  --   --   --   --   --   --   --  2.9*   PHOS 3.2  --   --   --   --   --   --   --  3.9   ALB 1.5*  --   --  1.6* 1.6*   < > 1.6*   < > 1.6*   TBILI 0.4  --   --  0.5 0.5   < > 0.5   < > 0.3   ALT 1,361*  --   --  1,551* 1,461*   < > 1,467*   < > 1,492*   INR 1.8*  --  1.8* 1.9*  --   --   --    < >  --     < > = values in this interval not displayed. Recent Labs     05/20/22  0428 05/19/22  0400 05/18/22  1730   PH 7.45 7.51* 7.52*   PCO2 29* 26* 25*   PO2 83 68* 154*   HCO3 21* 23 23   FIO2 30.0 30.0 60.0         Radiology:   US ABD LTD    Result Date: 5/19/2022  1. Question pericholecystic fluid. No identified gallstones or sludge. 2. Right pleural effusion. 3. Increased right renal echotexture for medical renal disease. XR CHEST PORT    Result Date: 5/20/2022  1. The endotracheal tube is in satisfactory position. 2.  There has been a partial interval resolution in the pulmonary interstitial edema pattern. XR CHEST PORT    Result Date: 5/19/2022  Ill-defined haziness in the mid to lower lung zones suspect for mild atelectatic changes and/or interstitial fluid or pleural fluid. XR CHEST PORT    Result Date: 5/18/2022  Intubation. Findings suggesting hydrostatic edema. XR CHEST PORT    Result Date: 5/14/2022  No evidence of an acute cardiopulmonary process. Assessment /Plan:     Active Problems:    Sacral ulcer ([address] (5/14/2022)          89-year-old female  admitted with severe decubitus ulcer related infection/sepsis. 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    2) patient is currently in shock secondary to sepsis/cardiogenic shock.  -She is intubated on pressor support.   -Marked elevation in liver transaminases consistent with shock liver.  -Also has acute renal failure.  -Currently being treated for sepsis with antibiotics. Cardiology is also following the patient. Her EF is down to 20 to 25%     3) coagulopathy: Appears to be secondary to liver failure.  -Not responding much to vitamin K INR remains prolonged at 1.8  -No evidence of DIC. She has normal fibrinogen and PTT. -Recommend to give vitamin K 10 mg daily for 3 days. -Give fresh frozen plasma 2 units today due to concerns for rectal bleeding and drop in H&H.  -Her platelet count is normal now.    4) anemia: Acute on chronic anemia. Likely secondary to CKD and some bleeding from ongoing wound problems. -Getting 2 units of packed red blood cells today  -We will check iron studies K05 and folic acid levels-result is pending.     Transfuse for hemoglobin less than 7 g/dL         Tian Amaya MD  5/20/2022

## 2022-05-20 NOTE — PROGRESS NOTES
Progress Note    Patient: Serafin Paige MRN: [id number]  SSN: [id number]    YOB: 1945  Age: 78 y.o. Sex: female      Admit Date: 5/14/2022    LOS: 6 days     Subjective:   Patient followed for sacral wound infection with repeat culture growing Acinetobacter and Enterococci. Left foot wound also grew Acinetobacter. Sacral wound debridement and loop colostomy was interrupted because of hypotension and respiratory failure. Her LFTs increased significantly which GI attributes to hypotension. WBC procal and CRP remain elevated. She is currently on Vancomycin and Ceftazidime. Daughter at bedside. Objective:     Vitals:    05/20/22 0700 05/20/22 0820 05/20/22 0900 05/20/22 1100   BP: (!) 116/51      Pulse: 72 72     Resp: 14 23     Temp: 99 °F (37.2 °C)   99.6 °F (37.6 °C)   SpO2: 100% 100% 100%    Weight:       Height:            Intake and Output:  Current Shift: No intake/output data recorded. Last three shifts: 05/18 1901 - 05/20 0700  In: 1969.1 [I.V.:746.1]  Out: 2370 [QAIBT:3283; Drains:400]    Physical Exam:    Vitals and nursing note reviewed. Constitutional:       General: She is not in acute distress. Appearance: She is ill-appearing. HENT: ET Tube  Eyes: closed   Cardiovascular:      Rate and Rhythm: Normal rate and regular rhythm. Heart sounds: No murmur heard. Pulmonary: diffuse rhonchi     Effort: Pulmonary effort is normal.      Breath sounds: Normal breath sounds. Abdominal:      General: Bowel sounds are normal.      Palpations: Abdomen is soft. Tenderness: There is no abdominal tenderness. Comments: PEG site unremarkable   Genitourinary:     Comments: Indwelling Bell with clear urine             Musculoskeletal:      Cervical back: Neck supple. Right lower leg: No edema. Left lower leg: No edema. Comments: Left foot wound with normal granulation tissue   Skin:     Findings: No rash.              Comments: Stage 3 sacral wound Neurological: intubated   Psychiatric:  intubated      Lab/Data Review:     WBC 17,200  AST 1,420 <>2000/ALT 1,492    Procal 1.72 <2.32 <0.59 <0.57  CRP 17.10 <18.00 <18.90 <17.90    Blood cultures (5/14) Coagulase negative Staphylococci  Sacral wound (5/14) Acinetobacter baumannii sensitive to Unasyn, Cefepime, Ceftazidime and Enterococcus faecium  Left great toe (5/15) Acinetobacter baumannii  Sputum culture (5/19) Pending  Assessment:     Active Problems:    Sacral ulcer ([address] (5/14/2022)    1. Sacral wound infection secondary now to Acinetobacter baumannii and Enterococcus faecium, on Vancomycin and Unasyn  2. Sepsis with persistent leukocytosis, elevated procal and CRP  3. Left foot wound, culture growing Acinetobacter baumannii  4. Possible ischemic hepatitiis with transaminitis following hypotensive episode  5. Positive blood cultures for Coagulase negative Staphylococci, probable contaminant  6. Hepatitis C antibody positive, resolved (negative HCV viral level)    Comment:  WBC increased again but procal and CRP decreasing. Seen by GI and felt to have probable shock liver, therefore will restart Unasyn. Plan:   1. Continue IV Vancomycin  2. Restart Unasyn  3. Discontinue Ceftazidime  4. Follow-up sputum culture (may require antibiotic adjustment)  6.  In am, repeat CBC, CRP, procal      Signed By: Juan Ahmadi MD     May 20, 2022

## 2022-05-21 NOTE — PROGRESS NOTES
Hematology Oncology Progress Note     Interval History :  Patient remains intubated and sedated not much responsive. Her daughter is at bedside. Hemoglobin is better today at 8.5 g/dL. Received 1 unit of PRBC. She also received plasma. No bleeding is reported today . Central line site shows no bleeding and Bell catheter is clear. She has leukocytosis WBC count improved to 12.9 g/dL .     Subjective:         Current Facility-Administered Medications   Medication Dose Route Frequency    [START ON 5/22/2022] Vancomycin - Random level to be drawn w/ AM labs 5/22   Other ONCE    sacubitriL-valsartan (ENTRESTO) 24-26 mg tablet 1 Tablet  1 Tablet Oral Q12H    [START ON 5/22/2022] metoprolol succinate (TOPROL-XL) XL tablet 25 mg  25 mg Oral DAILY    ampicillin-sulbactam (UNASYN) 3 g in 0.9% sodium chloride (MBP/ADV) 100 mL MBP  3 g IntraVENous Q8H    dextrose 10% infusion 0-250 mL  0-250 mL IntraVENous PRN    insulin glargine (LANTUS) injection 10 Units  10 Units SubCUTAneous QHS    NOREPINephrine (LEVOPHED) 32,000 mcg in dextrose 5% 250 mL (128 mcg/mL) infusion  2 mcg/min IntraVENous TITRATE    propofol (DIPRIVAN) 10 mg/mL infusion  0-50 mcg/kg/min IntraVENous TITRATE    0.9% sodium chloride infusion 250 mL  250 mL IntraVENous PRN    sodium hypochlorite (HALF STRENGTH DAKIN'S) 0.25% irrigation (bottle)   Topical BID    glucose chewable tablet 16 g  4 Tablet Oral PRN    glucagon (GLUCAGEN) injection 1 mg  1 mg IntraMUSCular PRN    insulin lispro (HUMALOG) injection   SubCUTAneous Q6H    Vancomycin Pharmacy Dosing   Other Rx Dosing/Monitoring    sodium chloride (NS) flush 5-40 mL  5-40 mL IntraVENous Q8H    acetaminophen (TYLENOL) tablet 650 mg  650 mg Oral Q6H PRN    Or    acetaminophen (TYLENOL) suppository 650 mg  650 mg Rectal Q6H PRN    polyethylene glycol (MIRALAX) packet 17 g  17 g Oral DAILY PRN    ondansetron (ZOFRAN ODT) tablet 4 mg  4 mg Oral Q8H PRN    Or    ondansetron (ZOFRAN) injection 4 mg  4 mg IntraVENous Q6H PRN    [Held by provider] enoxaparin (LOVENOX) injection 40 mg  40 mg SubCUTAneous DAILY    famotidine (PEPCID) tablet 20 mg  20 mg Oral BID    acidophilus-pectin, citrus tablet 2 Tablet  2 Tablet Oral DAILY    albuterol-ipratropium (DUO-NEB) 2.5 MG-0.5 MG/3 ML  3 mL Nebulization Q6H PRN    artificial saliva (MOUTH KOTE) 1 Spray  1 Spray Oral TID    aspirin delayed-release tablet 81 mg  81 mg Oral DAILY    brimonidine (ALPHAGAN) 0.2 % ophthalmic solution 1 Drop  1 Drop Both Eyes TID    cholestyramine-aspartame (QUESTRAN LIGHT) packet 4 g  4 g Oral BID WITH MEALS    [Held by provider] fenofibrate nanocrystallized (TRICOR) tablet 48 mg  48 mg Oral DAILY    ferrous sulfate tablet 325 mg  325 mg Oral BID    gabapentin (NEURONTIN) capsule 300 mg  300 mg Oral BID    hydrALAZINE (APRESOLINE) tablet 50 mg  50 mg Oral TID    insulin glargine (LANTUS) injection 50 Units  50 Units SubCUTAneous DAILY    latanoprost (XALATAN) 0.005 % ophthalmic solution 1 Drop  1 Drop Right Eye QPM    traMADoL (ULTRAM) tablet 25 mg  25 mg Oral Q12H PRN        Review of Systems:  Review of systems not obtainable patient is intubated on vent support. Objective:     Patient Vitals for the past 8 hrs:   BP Temp Pulse Resp SpO2 Weight   22 1400 (!) 122/54 -- 61 13 100 % --   22 1300 (!) 120/54 -- 63 12 100 % --   22 1200 (!) 121/53 -- 62 12 100 % --   22 1100 (!) 123/57 98.1 °F (36.7 °C) 67 13 100 % --   22 1000 115/87 -- 66 14 100 % --   22 0900 (!) 117/56 -- 63 12 100 % --   22 0826 (!) 86/43 -- 61 -- -- --   22 0817 -- -- -- -- -- 86.4 kg (190 lb 7.6 oz)   22 0800 (!) 114/57 -- 61 12 100 % --       Temp (24hrs), Av.1 °F (37.3 °C), Min:98.1 °F (36.7 °C), Max:100.1 °F (37.8 °C)      Physical Exam:    Constitutional  -American female, elderly currently intubated and sedated. Appears close to chronological age. Well nourished.  Well developed. Head Normocephalic; no scars   Eyes Conjunctivae and sclerae are clear and without icterus. Pupils are reactive and equal.   ENMT  ET tube is present   Neck Supple without masses or thyromegaly. No jugular venous distension. Hematologic/Lymphatic No petechiae or purpura. No tender or palpable lymph nodes in the cervical, supraclavicular, axillary or inguinal area. Respiratory Lungs are clear to auscultation without rhonchi or wheezing. Cardiovascular Regular rate and rhythm of heart without murmurs, gallops or rubs. Chest / Line Site Chest is symmetric with no chest wall deformities. Abdomen  abdomen is soft nontender PEG tube is present no bleeding at the site . Musculoskeletal No tenderness or swelling, normal range of motion without obvious weakness. Extremities  she has peripheral vascular disease changes with amputated toes and dressing on her feet and ankles   Skin No rashes, scars, or lesions suggestive of malignancy. No petechiae, purpura, or ecchymoses. No excoriations.     Neurologic  not responsive at this time due to sedation   Psychiatric      Lab/Data Review:  Recent Labs     05/21/22  0500 05/20/22  0540 05/19/22  0415   WBC 12.9* 17.2* 13.1*   HGB 8.5* 6.8* 7.8*   HCT 26.9* 22.0* 24.0*    334 314     Recent Labs     05/21/22  0515 05/21/22  0500 05/20/22  0540 05/19/22  1720 05/19/22  1257 05/19/22  0525 05/19/22  0525   NA  --  148* 144  --   --   --  146*   K  --  3.4* 3.7  --   --   --  4.0   CL  --  117* 118*  --   --   --  117*   CO2  --  22 19*  --   --   --  19*   GLU  --  232* 240*  --   --   --  67   BUN  --  48* 55*  --   --   --  67*   CREA  --  1.16* 1.45* 1.40*  --    < > 1.70*   CA  --  8.1* 8.6  --   --   --  8.8   MG 2.5*  --  2.4  --   --   --   --    PHOS  --   --  3.2  --   --   --   --    ALB  --  1.7* 1.5*  --   --   --  1.6*   TBILI  --  0.5 0.4  --   --   --  0.5   ALT  --  1,313* 1,361*  --   --   --  1,551*   INR  --  1.6* 1.8*  --  1.8*   < > 1.9*    < > = values in this interval not displayed. Recent Labs     05/21/22  0430 05/20/22  0428 05/19/22  0400   PH 7.41 7.45 7.51*   PCO2 32* 29* 26*   PO2 99 83 68*   HCO3 21* 21* 23   FIO2 30.0 30.0 30.0         Radiology:   US ABD LTD    Result Date: 5/19/2022  1. Question pericholecystic fluid. No identified gallstones or sludge. 2. Right pleural effusion. 3. Increased right renal echotexture for medical renal disease. XR CHEST PORT    Result Date: 5/21/2022  No significant change allowing for difference in technique and positioning. Single portable AP view compared to yesterday. Rotation to the right. Monitoring equipment overlies the body. Suboptimal inspiratory film compromises visualization of the lower lung fields. Mild cardiomegaly. The tip of the ET tube is approximately 3 cm above the jennifer. Mild basal interstitial edema. No new consolidation. No pleural effusion or pneumothorax. XR CHEST PORT    Result Date: 5/20/2022  1. The endotracheal tube is in satisfactory position. 2.  There has been a partial interval resolution in the pulmonary interstitial edema pattern. XR CHEST PORT    Result Date: 5/19/2022  Ill-defined haziness in the mid to lower lung zones suspect for mild atelectatic changes and/or interstitial fluid or pleural fluid. XR CHEST PORT    Result Date: 5/18/2022  Intubation. Findings suggesting hydrostatic edema. XR CHEST PORT    Result Date: 5/14/2022  No evidence of an acute cardiopulmonary process. Assessment /Plan:     Active Problems:    Sacral ulcer ([address] (5/14/2022)          89-year-old female  admitted with severe decubitus ulcer related infection/sepsis. 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    2) patient is currently in shock secondary to sepsis/cardiogenic shock.  -She is intubated on pressor support.   -Marked elevation in liver transaminases consistent with shock liver.  -Also has acute renal failure.  -Currently being treated for sepsis with antibiotics. Cardiology is also following the patient. Her EF is down to 20 to 25%     3) coagulopathy: Appears to be secondary to liver failure. -Getting vitamin K 10 mg for 3 days.  -No evidence of DIC. She has normal fibrinogen and PTT. -Received 2 units of plasma for concerns for bleeding. No bleeding is reported today. -INR is down to 1.6.  -Her platelet count is normal now.    4) anemia: Acute on chronic anemia.   Likely secondary to CKD and some bleeding from ongoing wound problems.  -Post packed red blood cell transfusion hemoglobin improved to 8.5 g/dL  -Her iron studies are normal.  X32 folic acid is pending    Transfuse for hemoglobin less than 7 g/dL         Isaías Vera MD  5/21/2022

## 2022-05-21 NOTE — PROGRESS NOTES
General Daily Progress Note          Patient Name:   Jean Lr       YOB: 1945       Age:  78 y.o. 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  Cipro    Admitted again yesterday concerning for worsening of sacral ulcer    During last admission patient was seen by infectious disease and also seen by the vascular surgeon patient had debridement of wound during the last admission    Patient seen by the infectious disease yesterday    Sacral wound infection recent cultures growing Pseudomonas resistant to Zosyn and meropenem    5/17    Patient alert awake not in distress  Patient was seen by the vascular surgeon    Vascular surgery planned for laparoscopic loop colostomy placement and sacral debridement then wound vacuum  Also try to close the wound on the left foot with TMA    5/18    Resting in the bed not in any distress  Blood sugars running high    Seen by infectious disease continue vancomycin and start on Unasyn    5/19    And went to the OR intubated because of elevated LFT and elevated INR  Surgery was canceled    On ventilator 40% O2  Propofol drip    5/20    Patient on ventilator with 30% O2  On propofol drip    Hemoglobin dropped to 6.8    Patient's daughter at the bedside    5/20    Patient on ventilator 30% O2  On propofol drip  Getting PEG tube feeding    Objective:     Visit Vitals  BP (!) 121/53   Pulse 62   Temp 98.1 °F (36.7 °C)   Resp 12   Ht 5' 7.01\" (1.702 m)   Wt 86.4 kg (190 lb 7.6 oz)   SpO2 100%   Breastfeeding No   BMI 29.83 kg/m²        Recent Results (from the past 24 hour(s))   GLUCOSE, POC Collection Time: 05/20/22  5:11 PM   Result Value Ref Range    Glucose (POC) 267 (H) 65 - 117 mg/dL    Performed by Friday NOÉ Goldstein    Collection Time: 05/20/22  5:40 PM   Result Value Ref Range    Vancomycin, random 18.6 ug/mL   GLUCOSE, POC    Collection Time: 05/20/22 11:39 PM   Result Value Ref Range    Glucose (POC) 236 (H) 65 - 117 mg/dL    Performed by Sandra Mustafa    BLOOD GAS, ARTERIAL    Collection Time: 05/21/22  4:30 AM   Result Value Ref Range    pH 7.41 7.35 - 7.45      PCO2 32 (L) 35 - 45 mmHg    PO2 99 75 - 100 mmHg    O2 SAT 98 >95 %    BICARBONATE 21 (L) 22 - 26 mmol/L    BASE DEFICIT 3.9 (H) 0 - 2 mmol/L    O2 METHOD VENT      FIO2 30.0 %    MODE Assist Control/Volume Control      Tidal volume 500      SET RATE 12      EPAP/CPAP/PEEP 5.0      SITE Right Radial      EVELIN'S TEST PASS     CBC WITH AUTOMATED DIFF    Collection Time: 05/21/22  5:00 AM   Result Value Ref Range    WBC 12.9 (H) 3.6 - 11.0 K/uL    RBC 3.00 (L) 3.80 - 5.20 M/uL    HGB 8.5 (L) 11.5 - 16.0 g/dL    HCT 26.9 (L) 35.0 - 47.0 %    MCV 89.7 80.0 - 99.0 FL    MCH 28.3 26.0 - 34.0 PG    MCHC 31.6 30.0 - 36.5 g/dL    RDW 17.2 (H) 11.5 - 14.5 %    PLATELET 020 262 - 462 K/uL    MPV 11.2 8.9 - 12.9 FL    NRBC 6.0 (H) 0.0  WBC    ABSOLUTE NRBC 0.77 (H) 0.00 - 0.01 K/uL    NEUTROPHILS 74 32 - 75 %    LYMPHOCYTES 14 12 - 49 %    MONOCYTES 6 5 - 13 %    EOSINOPHILS 2 0 - 7 %    BASOPHILS 1 0 - 1 %    IMMATURE GRANULOCYTES 3 (H) 0 - 0.5 %    ABS. NEUTROPHILS 9.5 (H) 1.8 - 8.0 K/UL    ABS. LYMPHOCYTES 1.8 0.8 - 3.5 K/UL    ABS. MONOCYTES 0.8 0.0 - 1.0 K/UL    ABS. EOSINOPHILS 0.3 0.0 - 0.4 K/UL    ABS. BASOPHILS 0.1 0.0 - 0.1 K/UL    ABS. IMM.  GRANS. 0.3 (H) 0.00 - 0.04 K/UL    DF AUTOMATED     METABOLIC PANEL, COMPREHENSIVE    Collection Time: 05/21/22  5:00 AM   Result Value Ref Range    Sodium 148 (H) 136 - 145 mmol/L    Potassium 3.4 (L) 3.5 - 5.1 mmol/L    Chloride 117 (H) 97 - 108 mmol/L    CO2 22 21 - 32 mmol/L    Anion gap 9 5 - 15 mmol/L    Glucose 232 (H) 65 - 100 mg/dL    BUN 48 (H) 6 - 20 mg/dL    Creatinine 1.16 (H) 0.55 - 1.02 mg/dL    BUN/Creatinine ratio 41 (H) 12 - 20      GFR est AA 55 (L) >60 ml/min/1.73m2    GFR est non-AA 46 (L) >60 ml/min/1.73m2    Calcium 8.1 (L) 8.5 - 10.1 mg/dL    Bilirubin, total 0.5 0.2 - 1.0 mg/dL    AST (SGOT) 574 (H) 15 - 37 U/L    ALT (SGPT) 1,313 (H) 12 - 78 U/L    Alk. phosphatase 114 45 - 117 U/L    Protein, total 6.1 (L) 6.4 - 8.2 g/dL    Albumin 1.7 (L) 3.5 - 5.0 g/dL    Globulin 4.4 (H) 2.0 - 4.0 g/dL    A-G Ratio 0.4 (L) 1.1 - 2.2     PROTHROMBIN TIME + INR    Collection Time: 05/21/22  5:00 AM   Result Value Ref Range    Prothrombin time 19.3 (H) 11.9 - 14.6 sec    INR 1.6 (H) 0.9 - 1.1     C REACTIVE PROTEIN, QT    Collection Time: 05/21/22  5:00 AM   Result Value Ref Range    C-Reactive protein 13.00 (H) 0.00 - 0.60 mg/dL   VANCOMYCIN, RANDOM    Collection Time: 05/21/22  5:00 AM   Result Value Ref Range    Vancomycin, random 17.6 ug/mL   GLUCOSE, POC    Collection Time: 05/21/22 11:30 AM   Result Value Ref Range    Glucose (POC) 225 (H) 65 - 117 mg/dL    Performed by Ryley Santiago      [unfilled]      Review of Systems    Not a good historian e. Physical Exam:      Constitutional: On ventilator  HENT:   Head: Normocephalic and atraumatic. Eyes: Pupils are equal, round, and reactive to light. EOM are normal.   Cardiovascular: Normal rate, regular rhythm and normal heart sounds. Pulmonary/Chest: Breath sounds normal. No wheezes. No rales. Exhibits no tenderness. Abdominal: Soft. Bowel sounds are normal. There is no abdominal tenderness. There is no rebound and no guarding. Musculoskeletal: Normal range of motion. Neurological: On ventilator   skin sacral decubitus ulcer and left foot wound right big toe amputation    XR CHEST PORT   Final Result   No significant change allowing for difference in technique and   positioning.       Single portable AP view compared to yesterday. Rotation to the right. Monitoring   equipment overlies the body. Suboptimal inspiratory film compromises   visualization of the lower lung fields. Mild cardiomegaly. The tip of the ET tube is approximately 3 cm above the jennifer. Mild basal interstitial edema. No new consolidation. No pleural effusion or   pneumothorax. XR CHEST PORT   Final Result   1. The endotracheal tube is in satisfactory position. 2.  There has been a partial interval resolution in the pulmonary interstitial   edema pattern. XR CHEST PORT   Final Result   Ill-defined haziness in the mid to lower lung zones suspect for mild   atelectatic changes and/or interstitial fluid or pleural fluid. US ABD LTD   Final Result   1. Question pericholecystic fluid. No identified gallstones or sludge. 2. Right pleural effusion. 3. Increased right renal echotexture for medical renal disease. XR CHEST PORT   Final Result   Intubation. Findings suggesting hydrostatic edema. XR CHEST PORT   Final Result   No evidence of an acute cardiopulmonary process.       XR CHEST PORT    (Results Pending)        Recent Results (from the past 24 hour(s))   GLUCOSE, POC    Collection Time: 05/20/22  5:11 PM   Result Value Ref Range    Glucose (POC) 267 (H) 65 - 117 mg/dL    Performed by Friday Gardenia Mccabe, RANDOM    Collection Time: 05/20/22  5:40 PM   Result Value Ref Range    Vancomycin, random 18.6 ug/mL   GLUCOSE, POC    Collection Time: 05/20/22 11:39 PM   Result Value Ref Range    Glucose (POC) 236 (H) 65 - 117 mg/dL    Performed by Liliane Mandujano    BLOOD GAS, ARTERIAL    Collection Time: 05/21/22  4:30 AM   Result Value Ref Range    pH 7.41 7.35 - 7.45      PCO2 32 (L) 35 - 45 mmHg    PO2 99 75 - 100 mmHg    O2 SAT 98 >95 %    BICARBONATE 21 (L) 22 - 26 mmol/L    BASE DEFICIT 3.9 (H) 0 - 2 mmol/L    O2 METHOD VENT      FIO2 30.0 %    MODE Assist Control/Volume Control      Tidal volume 500      SET RATE 12      EPAP/CPAP/PEEP 5.0      SITE Right Radial      EVELIN'S TEST PASS     CBC WITH AUTOMATED DIFF    Collection Time: 05/21/22  5:00 AM   Result Value Ref Range    WBC 12.9 (H) 3.6 - 11.0 K/uL    RBC 3.00 (L) 3.80 - 5.20 M/uL    HGB 8.5 (L) 11.5 - 16.0 g/dL    HCT 26.9 (L) 35.0 - 47.0 %    MCV 89.7 80.0 - 99.0 FL    MCH 28.3 26.0 - 34.0 PG    MCHC 31.6 30.0 - 36.5 g/dL    RDW 17.2 (H) 11.5 - 14.5 %    PLATELET 495 040 - 325 K/uL    MPV 11.2 8.9 - 12.9 FL    NRBC 6.0 (H) 0.0  WBC    ABSOLUTE NRBC 0.77 (H) 0.00 - 0.01 K/uL    NEUTROPHILS 74 32 - 75 %    LYMPHOCYTES 14 12 - 49 %    MONOCYTES 6 5 - 13 %    EOSINOPHILS 2 0 - 7 %    BASOPHILS 1 0 - 1 %    IMMATURE GRANULOCYTES 3 (H) 0 - 0.5 %    ABS. NEUTROPHILS 9.5 (H) 1.8 - 8.0 K/UL    ABS. LYMPHOCYTES 1.8 0.8 - 3.5 K/UL    ABS. MONOCYTES 0.8 0.0 - 1.0 K/UL    ABS. EOSINOPHILS 0.3 0.0 - 0.4 K/UL    ABS. BASOPHILS 0.1 0.0 - 0.1 K/UL    ABS. IMM. GRANS. 0.3 (H) 0.00 - 0.04 K/UL    DF AUTOMATED     METABOLIC PANEL, COMPREHENSIVE    Collection Time: 05/21/22  5:00 AM   Result Value Ref Range    Sodium 148 (H) 136 - 145 mmol/L    Potassium 3.4 (L) 3.5 - 5.1 mmol/L    Chloride 117 (H) 97 - 108 mmol/L    CO2 22 21 - 32 mmol/L    Anion gap 9 5 - 15 mmol/L    Glucose 232 (H) 65 - 100 mg/dL    BUN 48 (H) 6 - 20 mg/dL    Creatinine 1.16 (H) 0.55 - 1.02 mg/dL    BUN/Creatinine ratio 41 (H) 12 - 20      GFR est AA 55 (L) >60 ml/min/1.73m2    GFR est non-AA 46 (L) >60 ml/min/1.73m2    Calcium 8.1 (L) 8.5 - 10.1 mg/dL    Bilirubin, total 0.5 0.2 - 1.0 mg/dL    AST (SGOT) 574 (H) 15 - 37 U/L    ALT (SGPT) 1,313 (H) 12 - 78 U/L    Alk.  phosphatase 114 45 - 117 U/L    Protein, total 6.1 (L) 6.4 - 8.2 g/dL    Albumin 1.7 (L) 3.5 - 5.0 g/dL    Globulin 4.4 (H) 2.0 - 4.0 g/dL    A-G Ratio 0.4 (L) 1.1 - 2.2     PROTHROMBIN TIME + INR    Collection Time: 05/21/22  5:00 AM   Result Value Ref Range    Prothrombin time 19.3 (H) 11.9 - 14.6 sec    INR 1.6 (H) 0.9 - 1.1     C REACTIVE PROTEIN, QT    Collection Time: 05/21/22  5:00 AM   Result Value Ref Range    C-Reactive protein 13.00 (H) 0.00 - 0.60 mg/dL   VANCOMYCIN, RANDOM    Collection Time: 05/21/22  5:00 AM   Result Value Ref Range    Vancomycin, random 17.6 ug/mL   GLUCOSE, POC    Collection Time: 05/21/22 11:30 AM   Result Value Ref Range    Glucose (POC) 225 (H) 65 - 117 mg/dL    Performed by Nakia Boyd        Results     Procedure Component Value Units Date/Time    CULTURE, RESPIRATORY/SPUTUM/BRONCH Celso Mouna [[id number]] Collected: 05/19/22 1448    Order Status: Completed Specimen: Sputum Updated: 05/21/22 0813     Special Requests: No Special Requests        GRAM STAIN 1+ WBCs seen         no epithelial cells seen         No organisms seen        Culture result:       Rare Normal respiratory yasmine          CULTURE, WOUND [facility] STAIN [[id number]]  (Susceptibility) Collected: 05/15/22 1915    Order Status: Completed Specimen: Wound from Great Toe Updated: 05/18/22 0835     Special Requests: No Special Requests        GRAM STAIN       3+ Gram Positive Rods (Coryneform)                  1+ apparent Gram Negative Rods           Culture result:       Heavy Acinetobacter baumannii complex            Heavy Diphtheroids       Susceptibility      Acinetobacter baumannii complex     GEMA     Ampicillin/sulbactam ($) Susceptible     Cefepime ($$) Susceptible     Ceftazidime ($) Susceptible     Ceftriaxone ($) Intermediate     Ciprofloxacin ($) Resistant     Gentamicin ($) Susceptible     Levofloxacin ($) Resistant     Meropenem ($$) Resistant     Piperacillin/Tazobac ($) Resistant     Tobramycin ($) Susceptible     Trimeth/Sulfa Susceptible                  Linear View                   CULTURE, Mabeline [place] STAIN [[id number]]  (Susceptibility) Collected: 05/14/22 0246    Order Status: Completed Specimen: Wound from Ulcer Updated: 05/18/22 0891     Special Requests: No Special Requests        GRAM STAIN Few WBCs seen         2+ Gram Negative Rods               Occasional Gram Positive Cocci in pairs            Rare Gram Positive Rods        Culture result: Moderate Acinetobacter baumannii complex                  Moderate Enterococcus faecium                  Light >2 organisms - contaminated specimen. suggest recollection Anaerobic gram negative rods Beta Lactamase Positive          Susceptibility      Acinetobacter baumannii complex     GEMA     Ampicillin/sulbactam ($) Susceptible     Cefepime ($$) Susceptible     Ceftazidime ($) Susceptible     Ceftriaxone ($) Intermediate     Ciprofloxacin ($) Resistant     Gentamicin ($) Susceptible     Levofloxacin ($) Resistant     Meropenem ($$) Resistant     Piperacillin/Tazobac ($) Resistant     Tobramycin ($) Susceptible     Trimeth/Sulfa Susceptible                  Linear View               Susceptibility      Enterococcus faecium     GEMA     Ampicillin ($) Resistant     Daptomycin ($$$$$)  See below  [1]      Linezolid ($$$$$) Susceptible     Vancomycin ($) Susceptible                 [1]  Dose Dependent  (SENSITIVITIES PERFORMED BY E-TEST)          Linear View                   CULTURE, BLOOD, PAIRED [[id number]]  (Abnormal) Collected: 05/14/22 2000    Order Status: Completed Specimen: Blood Updated: 05/17/22 1212     Special Requests: No Special Requests        Culture result:       Staphylococcus species, coagulase negative GROWING IN THE ANAEROBIC BOTTLE. No Site Indicated            (NOTE) PRELIMINARY REPORT OF GRAM POSITIVE COCCI IN CLUSTERS GROWING IN THE ANAEROBIC BOTTLE, CALLED TO AND READ BACK BY CANDI KNOWLES 5/16/22 1118 SCP.            Labs:     Recent Labs     05/21/22  0500 05/20/22  0540   WBC 12.9* 17.2*   HGB 8.5* 6.8*   HCT 26.9* 22.0*    334     Recent Labs     05/21/22  0500 05/20/22  0540 05/19/22  1720 05/19/22  0525 05/19/22  0525   * 144  --   --  146*   K 3.4* 3.7  --   --  4.0   * 118*  --   --  117*   CO2 22 19*  --   -- 19*   BUN 48* 55*  --   --  67*   CREA 1.16* 1.45* 1.40*   < > 1.70*   * 240*  --   --  67   CA 8.1* 8.6  --   --  8.8   MG  --  2.4  --   --   --    PHOS  --  3.2  --   --   --     < > = values in this interval not displayed. Recent Labs     05/21/22  0500 05/20/22  0540 05/19/22  0525   ALT 1,313* 1,361* 1,551*    99 91   TBILI 0.5 0.4 0.5   TP 6.1* 6.4 6.6   ALB 1.7* 1.5* 1.6*   GLOB 4.4* 4.9* 5.0*     Recent Labs     05/21/22  0500 05/20/22  0540 05/19/22  1720 05/19/22  1257   INR 1.6* 1.8*  --  1.8*   PTP 19.3* 20.9*  --  21.2*   APTT  --  31.7 32.2  --       Recent Labs     05/19/22  1720   TIBC 141*   PSAT 28   FERR 21,838*      No results found for: FOL, RBCF   Recent Labs     05/21/22  0430 05/20/22  0428   PH 7.41 7.45   PCO2 32* 29*   PO2 99 83     No results for input(s): CPK, CKNDX, TROIQ in the last 72 hours.     No lab exists for component: CPKMB  Lab Results   Component Value Date/Time    Cholesterol, total 124 03/08/2022 07:40 AM    HDL Cholesterol 30 03/08/2022 07:40 AM    LDL,Direct 79 06/28/2021 12:00 AM    LDL, calculated 44.8 03/08/2022 07:40 AM    Triglyceride 202 (H) 05/16/2022 06:20 AM    CHOL/HDL Ratio 4.1 03/08/2022 07:40 AM     Lab Results   Component Value Date/Time    Glucose (POC) 225 (H) 05/21/2022 11:30 AM    Glucose (POC) 236 (H) 05/20/2022 11:39 PM    Glucose (POC) 267 (H) 05/20/2022 05:11 PM    Glucose (POC) 253 (H) 05/20/2022 11:06 AM    Glucose (POC) 248 (H) 05/20/2022 05:41 AM     Lab Results   Component Value Date/Time    Color Yellow/Straw 05/14/2022 08:08 PM    Appearance Clear 05/14/2022 08:08 PM    Specific gravity 1.014 05/14/2022 08:08 PM    pH (UA) 6.0 05/14/2022 08:08 PM    Protein 30 (A) 05/14/2022 08:08 PM    Glucose 50 (A) 05/14/2022 08:08 PM    Ketone Negative 05/14/2022 08:08 PM    Bilirubin Negative 05/14/2022 08:08 PM    Urobilinogen 0.1 05/14/2022 08:08 PM    Nitrites Negative 05/14/2022 08:08 PM    Leukocyte Esterase Trace (A) 05/14/2022 08:08 PM    Bacteria Negative 05/14/2022 08:08 PM    Bacteria Rare (A) 05/14/2022 08:08 PM    WBC 0-4 05/14/2022 08:08 PM    WBC 4 05/14/2022 08:08 PM    RBC 0-5 05/14/2022 08:08 PM    RBC 1 05/14/2022 08:08 PM         Assessment:   Acute respiratory failure on ventilator 40% O2  Sacral decubitus ulcer with recent culture growing Pseudomonas rx with  Zosyn and meropenem  Sepsis with leukocytosis elevated procalcitonin and CRP  Left foot wound  Recent removal of left great toe and second toe  CVA with PEG tube placement  History of aspiration pneumonia  History of chronic kidney disease  Hypertension  Hyperlipidemia  Anemia of chronic disease  Hyperkalemia  Static post transfusion  Uncontrolled diabetes  Hepatic shock  Coagulopathy  Elevated  troponin      Plan:     Unasyn 3 g every 8 hours  Aspirin 81 mg daily  Questran 4 g twice a day  Pepcid 20 twice daily  Ferrous sulfate 325 mg twice a day  Gabapentin 300 mg twice a day  Hydralazine 50 mg 3 times a day  Metoprolol 25 daily  Replace potassium  Entresto 1 tablet twice a day  Vancomycin with pharmacy protocol  Add Lantus 10 units in the evening    GI consult for hepatic shock  Hematology consult for coagulopathy      Monitor liver function/INR  Static post transfusion      Vascular surgery planned for laparoscopic loop colostomy placement and sacral debridement then wound vacuum  Also try to close the wound on the left foot with TMA      Current Facility-Administered Medications:     [START ON 5/22/2022] Vancomycin - Random level to be drawn w/ AM labs 5/22, , Other, ONCE, Chon Aguilera DO    sacubitriL-valsartan (ENTRESTO) 24-26 mg tablet 1 Tablet, 1 Tablet, Oral, Q12H, Terese Rogers NP    [START ON 5/22/2022] metoprolol succinate (TOPROL-XL) XL tablet 25 mg, 25 mg, Oral, DAILY, Terese Rogers NP    ampicillin-sulbactam (UNASYN) 3 g in 0.9% sodium chloride (MBP/ADV) 100 mL MBP, 3 g, IntraVENous, Q8H, Britni Monzon MD, Last Rate: 200 mL/hr at 05/21/22 0531, 3 g at 05/21/22 0531    dextrose 10% infusion 0-250 mL, 0-250 mL, IntraVENous, PRN, Israel Soto MD, 125 mL at 05/19/22 0537    insulin glargine (LANTUS) injection 10 Units, 10 Units, SubCUTAneous, QHS, Israel Soto MD, 10 Units at 05/20/22 2348    NOREPINephrine (LEVOPHED) 32,000 mcg in dextrose 5% 250 mL (128 mcg/mL) infusion, 2 mcg/min, IntraVENous, TITRATE, Valentin Fields MD    propofol (DIPRIVAN) 10 mg/mL infusion, 0-50 mcg/kg/min, IntraVENous, TITRATE, Karol Christine MD, Last Rate: 11.9 mL/hr at 05/21/22 0535, 25 mcg/kg/min at 05/21/22 0535    0.9% sodium chloride infusion 250 mL, 250 mL, IntraVENous, PRN, Israel Soto MD, Last Rate: 15 mL/hr at 05/20/22 2232, 250 mL at 05/20/22 2232    sodium hypochlorite (HALF STRENGTH DAKIN'S) 0.25% irrigation (bottle), , Topical, BID, Israel Soto MD, Given at 05/21/22 8480    glucose chewable tablet 16 g, 4 Tablet, Oral, PRN, Senait Soto MD    glucagon (GLUCAGEN) injection 1 mg, 1 mg, IntraMUSCular, PRN, Senait Soto MD    insulin lispro (HUMALOG) injection, , SubCUTAneous, Q6H, Israel Soto MD, 2 Units at 05/21/22 1149    Vancomycin Pharmacy Dosing, , Other, Rx Dosing/Monitoring, Patricio Washington,     sodium chloride (NS) flush 5-40 mL, 5-40 mL, IntraVENous, Q8H, Orestes Norman MD, 10 mL at 05/21/22 0600    acetaminophen (TYLENOL) tablet 650 mg, 650 mg, Oral, Q6H PRN, 650 mg at 05/18/22 0056 **OR** acetaminophen (TYLENOL) suppository 650 mg, 650 mg, Rectal, Q6H PRN, Pamela JACKSON MD, 650 mg at 05/16/22 2223    polyethylene glycol (MIRALAX) packet 17 g, 17 g, Oral, DAILY PRN, Pamela JACKSON MD    ondansetron (ZOFRAN ODT) tablet 4 mg, 4 mg, Oral, Q8H PRN **OR** ondansetron (ZOFRAN) injection 4 mg, 4 mg, IntraVENous, Q6H PRN, Orestes Norman MD    [Held by provider] enoxaparin (LOVENOX) injection 40 mg, 40 mg, SubCUTAneous, DAILY, Orestes Norman MD, 40 mg at 05/17/22 1006    famotidine (PEPCID) tablet 20 mg, 20 mg, Oral, BID, Noé Otto MD, 20 mg at 05/21/22 0819    acidophilus-pectin, citrus tablet 2 Tablet, 2 Tablet, Oral, DAILY, Yareli JACKSON MD, 2 Tablet at 05/21/22 0819    albuterol-ipratropium (DUO-NEB) 2.5 MG-0.5 MG/3 ML, 3 mL, Nebulization, Q6H PRN, Yareli JACKSON MD    artificial saliva (MOUTH KOTE) 1 Spray, 1 Spray, Oral, TID, Yareli JACKSON MD, 1 [place] at 05/21/22 0820    aspirin delayed-release tablet 81 mg, 81 mg, Oral, DAILY, Orestes Norman MD, 81 mg at 05/21/22 0819    brimonidine (ALPHAGAN) 0.2 % ophthalmic solution 1 Drop, 1 Drop, Both Eyes, TID, Orestes Norman MD, 1 Drop at 05/21/22 0820    cholestyramine-aspartame (QUESTRAN LIGHT) packet 4 g, 4 g, Oral, BID WITH MEALS, Orestes Norman MD, 4 g at 05/21/22 0818    [Held by provider] fenofibrate nanocrystallized (TRICOR) tablet 48 mg, 48 mg, Oral, DAILY, Orestes Norman MD, 48 mg at 05/19/22 0824    ferrous sulfate tablet 325 mg, 325 mg, Oral, BID, Orestes Norman MD, 325 mg at 05/21/22 1845    gabapentin (NEURONTIN) capsule 300 mg, 300 mg, Oral, BID, Orestes Norman MD, 300 mg at 05/21/22 0818    hydrALAZINE (APRESOLINE) tablet 50 mg, 50 mg, Oral, TID, Orestes Norman MD, 50 mg at 05/20/22 2228    insulin glargine (LANTUS) injection 50 Units, 50 Units, SubCUTAneous, DAILY, Orestes Norman MD, 50 Units at 05/21/22 0818    latanoprost (XALATAN) 0.005 % ophthalmic solution 1 Drop, 1 Drop, Right Eye, QPM, Orestes Norman MD, 1 Drop at 05/20/22 2000    traMADoL (ULTRAM) tablet 25 mg, 25 mg, Oral, Q12H PRN, Noé Otto MD, 25 mg at 05/18/22 3026

## 2022-05-21 NOTE — PROGRESS NOTES
IMPRESSION:   1. Acute hypoxic respiratory failure  2. Sacral decubiti ulcer with Acinetobacter and Enterococcus  3. Severe sepsis with septic shock  4. Left foot wound  5. Transaminitis  6. Shock liver  7. Hypokalemia  8. Symptomatic anemia  9. Additional workup outlined below  10. Pt is at high risk of sudden decline and decompensation with life threatening consequenses and continued end organ dysfunction and failure  11. Pt is critically ill. Time spent with pt and staff actively rendering care, managing pt and coordinating care as stated below; 30 minutes, exclusive of any procedures      RECOMMENDATIONS/PLAN:   1. ICU monitoring  1. Ventilator for mechanical life support and prevent respiratory arrest with protective lung strategies  2. On assist control mode 30% FiO2 ABG acceptable we will decrease the rate, will continue the vent   3. Awaiting for normalization of liver enzyme then patient will be scheduled for possible diverting loop colostomy sacral wound debridement and left foot wound closure, since then we will continue patient on ventilator  4. Severe sepsis with decubiti ulcer and was supposed to go for sacral wound debridement because of liver failure it was canceled   5. Patient is on Fortaz  6. Chest x-ray showed mild congestive changes  7. Anemia hemoglobin improved after 1 unit of blood transfusion  8. Her replace potassium  9. Chest x-ray shows improvement in the pulmonary edema  10. CVP monitoring off Levophed  11. IV vasopressors for circulatory shock refractory to fluids to maintain SBP> 90  12. Elevated troponin blood culture positive for Staphylococcus most likely contaminant  13. Transfuse prn to maintain Hgb > 7  14. Labs to follow electrolytes, renal function and and blood counts  15. Bronchial hygiene with respiratory therapy techniques, bronchodilators  16. Pt needs IV fluids with additives and Drug therapy requiring intensive monitoring for toxicity  17.  Prescription drug management with home med reconciliation reviewed  18. DVT, SUP prophylaxis  19. Will be available to assist in medical management while in the CCU pending disposition     [x] High complexity decision making was performed  [x] See my orders for details  HPI   51-year-old lady came in because of leg pain past medical history of hypertension diabetes mellitus peripheral vascular disease CVA she is bedbound she has leg wound and also has sacral decubiti ulcer and she was scheduled for laparoscopic colostomy and sacral wound debridement and amputation of the left tarsometatarsal because of wound infection but her condition got worse her liver enzyme was elevated INR was also elevated she was intubated transferred to ICU was getting vancomycin and Unasyn started on Levophed because of low blood pressure. PMH:  has a past medical history of Anemia, Chronic kidney disease, Diabetes mellitus ([address] Hemiplegia affecting dominant side, post-stroke ([address] (05/04/2022), HTN (hypertension), Hyperkalemia, Hyperlipidemia, Ill-defined condition, Neuropathy, PAD (peripheral artery disease) ([address] Sciatica, Stroke ([address] and UTI (urinary tract infection). PSH:   has a past surgical history that includes hx other surgical (Bilateral); hx amputation (Right); hx other surgical; hx cervical fusion; hx vascular stent (Bilateral); hx vascular stent (Bilateral); and hx gi. FHX: family history includes Cancer in her mother; Diabetes in her mother; Hypertension in her mother. SHX:  reports that she has never smoked. She has never used smokeless tobacco. She reports previous alcohol use. She reports that she does not use drugs.     ALL: No Known Allergies     MEDS:   [x] Reviewed - As Below   [] Not reviewed    Current Facility-Administered Medications   Medication    ampicillin-sulbactam (UNASYN) 3 g in 0.9% sodium chloride (MBP/ADV) 100 mL MBP    dextrose 10% infusion 0-250 mL    phytonadione (vitamin K1) (MEPHYTON) tablet 10 mg    insulin glargine (LANTUS) injection 10 Units    NOREPINephrine (LEVOPHED) 32,000 mcg in dextrose 5% 250 mL (128 mcg/mL) infusion    propofol (DIPRIVAN) 10 mg/mL infusion    sodium zirconium cyclosilicate (LOKELMA) powder packet 10 g    0.9% sodium chloride infusion 250 mL    sodium hypochlorite (HALF STRENGTH DAKIN'S) 0.25% irrigation (bottle)    glucose chewable tablet 16 g    glucagon (GLUCAGEN) injection 1 mg    insulin lispro (HUMALOG) injection    Vancomycin Pharmacy Dosing    sodium chloride (NS) flush 5-40 mL    acetaminophen (TYLENOL) tablet 650 mg    Or    acetaminophen (TYLENOL) suppository 650 mg    polyethylene glycol (MIRALAX) packet 17 g    ondansetron (ZOFRAN ODT) tablet 4 mg    Or    ondansetron (ZOFRAN) injection 4 mg    [Held by provider] enoxaparin (LOVENOX) injection 40 mg    famotidine (PEPCID) tablet 20 mg    acidophilus-pectin, citrus tablet 2 Tablet    albuterol-ipratropium (DUO-NEB) 2.5 MG-0.5 MG/3 ML    amLODIPine (NORVASC) tablet 5 mg    artificial saliva (MOUTH KOTE) 1 Spray    aspirin delayed-release tablet 81 mg    brimonidine (ALPHAGAN) 0.2 % ophthalmic solution 1 Drop    cholestyramine-aspartame (QUESTRAN LIGHT) packet 4 g    [Held by provider] fenofibrate nanocrystallized (TRICOR) tablet 48 mg    ferrous sulfate tablet 325 mg    gabapentin (NEURONTIN) capsule 300 mg    hydrALAZINE (APRESOLINE) tablet 50 mg    insulin glargine (LANTUS) injection 50 Units    latanoprost (XALATAN) 0.005 % ophthalmic solution 1 Drop    metoprolol tartrate (LOPRESSOR) tablet 50 mg    traMADoL (ULTRAM) tablet 25 mg      MAR reviewed and pertinent medications noted or modified as needed   Current Facility-Administered Medications   Medication    ampicillin-sulbactam (UNASYN) 3 g in 0.9% sodium chloride (MBP/ADV) 100 mL MBP    dextrose 10% infusion 0-250 mL    phytonadione (vitamin K1) (MEPHYTON) tablet 10 mg    insulin glargine (LANTUS) injection 10 Units    NOREPINephrine (LEVOPHED) 32,000 mcg in dextrose 5% 250 mL (128 mcg/mL) infusion    propofol (DIPRIVAN) 10 mg/mL infusion    sodium zirconium cyclosilicate (LOKELMA) powder packet 10 g    0.9% sodium chloride infusion 250 mL    sodium hypochlorite (HALF STRENGTH DAKIN'S) 0.25% irrigation (bottle)    glucose chewable tablet 16 g    glucagon (GLUCAGEN) injection 1 mg    insulin lispro (HUMALOG) injection    Vancomycin Pharmacy Dosing    sodium chloride (NS) flush 5-40 mL    acetaminophen (TYLENOL) tablet 650 mg    Or    acetaminophen (TYLENOL) suppository 650 mg    polyethylene glycol (MIRALAX) packet 17 g    ondansetron (ZOFRAN ODT) tablet 4 mg    Or    ondansetron (ZOFRAN) injection 4 mg    [Held by provider] enoxaparin (LOVENOX) injection 40 mg    famotidine (PEPCID) tablet 20 mg    acidophilus-pectin, citrus tablet 2 Tablet    albuterol-ipratropium (DUO-NEB) 2.5 MG-0.5 MG/3 ML    amLODIPine (NORVASC) tablet 5 mg    artificial saliva (MOUTH KOTE) 1 Spray    aspirin delayed-release tablet 81 mg    brimonidine (ALPHAGAN) 0.2 % ophthalmic solution 1 Drop    cholestyramine-aspartame (QUESTRAN LIGHT) packet 4 g    [Held by provider] fenofibrate nanocrystallized (TRICOR) tablet 48 mg    ferrous sulfate tablet 325 mg    gabapentin (NEURONTIN) capsule 300 mg    hydrALAZINE (APRESOLINE) tablet 50 mg    insulin glargine (LANTUS) injection 50 Units    latanoprost (XALATAN) 0.005 % ophthalmic solution 1 Drop    metoprolol tartrate (LOPRESSOR) tablet 50 mg    traMADoL (ULTRAM) tablet 25 mg      PMH:  has a past medical history of Anemia, Chronic kidney disease, Diabetes mellitus ([address] Hemiplegia affecting dominant side, post-stroke ([address] (05/04/2022), HTN (hypertension), Hyperkalemia, Hyperlipidemia, Ill-defined condition, Neuropathy, PAD (peripheral artery disease) ([address] Sciatica, Stroke ([address] and UTI (urinary tract infection).   PSH:   has a past surgical history that includes hx other surgical (Bilateral); hx amputation (Right); hx other surgical; hx cervical fusion; hx vascular stent (Bilateral); hx vascular stent (Bilateral); and hx gi. FHX: family history includes Cancer in her mother; Diabetes in her mother; Hypertension in her mother. SHX:  reports that she has never smoked. She has never used smokeless tobacco. She reports previous alcohol use. She reports that she does not use drugs. ROS:  Unable to obtain intubated on ventilator    Hemodynamics:    CO:    CI:    CVP:    SVR:   PAP Systolic:    PAP Diastolic:    PVR:    [id number]:        Ventilator Settings:      Mode Rate TV Press PEEP FiO2 PIP Min. Vent   Assist control,Volume control    500 ml    5 cm H20 30 %  24 cm H2O  5.8 l/min        Vital Signs: Telemetry:    normal sinus rhythm Intake/Output:   Visit Vitals  BP (!) 125/41 (BP 1 Location: Right arm, BP Patient Position: At rest)   Pulse 61   Temp 98.7 °F (37.1 °C)   Resp 12   Ht 5' 7.01\" (1.702 m)   Wt 85.8 kg (189 lb 2.5 oz)   SpO2 100%   Breastfeeding No   BMI 29.62 kg/m²       Temp (24hrs), Av.1 °F (37.3 °C), Min:98.5 °F (36.9 °C), Max:100.1 °F (37.8 °C)        O2 Device: Ventilator O2 Flow Rate (L/min): 1 l/min       Wt Readings from Last 4 Encounters:   22 85.8 kg (189 lb 2.5 oz)   04/10/22 86.6 kg (191 lb)   22 82.2 kg (181 lb 3.5 oz)   20 84.4 kg (186 lb)          Intake/Output Summary (Last 24 hours) at 2022 0754  Last data filed at 2022 0300  Gross per 24 hour   Intake 1530.5 ml   Output 1100 ml   Net 430.5 ml       Last shift:      No intake/output data recorded. Last 3 shifts:  1901 -  0700  In: 2728.5 [I.V.:237]  Out: 2105 [Urine:1705; Drains:400]       Physical Exam:     General: intubated on vent  HEENT: NCAT, poor dentition, lips and mucosa dry  Eyes: anicteric; conjunctiva clear  Neck: no nodes, no cuff leak, trach midline; no accessory MM use.   Chest: no deformity,   Cardiac: R regular; no murmur;   Lungs: distant breath sounds; no wheezes  Abd: soft, NT, hypoactive BS  Ext: no edema; no joint swelling;  No clubbing  : NO giraldo, clear urine  Neuro: sedated;   Psych-  unable to assess  Skin: warm, dry, no cyanosis;   Pulses: 1-2+ Bilateral pedal, radial  Capillary: brisk; pale      DATA:    MAR reviewed and pertinent medications noted or modified as needed  MEDS:   Current Facility-Administered Medications   Medication    ampicillin-sulbactam (UNASYN) 3 g in 0.9% sodium chloride (MBP/ADV) 100 mL MBP    dextrose 10% infusion 0-250 mL    phytonadione (vitamin K1) (MEPHYTON) tablet 10 mg    insulin glargine (LANTUS) injection 10 Units    NOREPINephrine (LEVOPHED) 32,000 mcg in dextrose 5% 250 mL (128 mcg/mL) infusion    propofol (DIPRIVAN) 10 mg/mL infusion    sodium zirconium cyclosilicate (LOKELMA) powder packet 10 g    0.9% sodium chloride infusion 250 mL    sodium hypochlorite (HALF STRENGTH DAKIN'S) 0.25% irrigation (bottle)    glucose chewable tablet 16 g    glucagon (GLUCAGEN) injection 1 mg    insulin lispro (HUMALOG) injection    Vancomycin Pharmacy Dosing    sodium chloride (NS) flush 5-40 mL    acetaminophen (TYLENOL) tablet 650 mg    Or    acetaminophen (TYLENOL) suppository 650 mg    polyethylene glycol (MIRALAX) packet 17 g    ondansetron (ZOFRAN ODT) tablet 4 mg    Or    ondansetron (ZOFRAN) injection 4 mg    [Held by provider] enoxaparin (LOVENOX) injection 40 mg    famotidine (PEPCID) tablet 20 mg    acidophilus-pectin, citrus tablet 2 Tablet    albuterol-ipratropium (DUO-NEB) 2.5 MG-0.5 MG/3 ML    amLODIPine (NORVASC) tablet 5 mg    artificial saliva (MOUTH KOTE) 1 Spray    aspirin delayed-release tablet 81 mg    brimonidine (ALPHAGAN) 0.2 % ophthalmic solution 1 Drop    cholestyramine-aspartame (QUESTRAN LIGHT) packet 4 g    [Held by provider] fenofibrate nanocrystallized (TRICOR) tablet 48 mg    ferrous sulfate tablet 325 mg    gabapentin (NEURONTIN) capsule 300 mg    hydrALAZINE (APRESOLINE) tablet 50 mg    insulin glargine (LANTUS) injection 50 Units    latanoprost (XALATAN) 0.005 % ophthalmic solution 1 Drop    metoprolol tartrate (LOPRESSOR) tablet 50 mg    traMADoL (ULTRAM) tablet 25 mg        Labs:    Recent Labs     05/21/22  0500 05/20/22  0540 05/19/22  1720 05/19/22  1257 05/19/22  0525 05/19/22  0415   WBC 12.9* 17.2*  --   --   --  13.1*   HGB 8.5* 6.8*  --   --   --  7.8*    334  --   --   --  314   INR 1.6* 1.8*  --  1.8*   < >  --    APTT  --  31.7 32.2  --   --   --     < > = values in this interval not displayed. Recent Labs     05/21/22  0500 05/20/22  0540 05/19/22  1720 05/19/22  0525 05/19/22  0525   * 144  --   --  146*   K 3.4* 3.7  --   --  4.0   * 118*  --   --  117*   CO2 22 19*  --   --  19*   * 240*  --   --  67   BUN 48* 55*  --   --  67*   CREA 1.16* 1.45* 1.40*   < > 1.70*   CA 8.1* 8.6  --   --  8.8   MG  --  2.4  --   --   --    PHOS  --  3.2  --   --   --    ALB 1.7* 1.5*  --   --  1.6*   ALT 1,313* 1,361*  --   --  1,551*    < > = values in this interval not displayed. Recent Labs     05/21/22  0430 05/20/22  0428 05/19/22  0400   PH 7.41 7.45 7.51*   PCO2 32* 29* 26*   PO2 99 83 68*   HCO3 21* 21* 23   FIO2 30.0 30.0 30.0     No results for input(s): CPK, CKNDX, TROIQ in the last 72 hours. No lab exists for component: CPKMB  No results found for: BNPP, BNP   Lab Results   Component Value Date/Time    Culture result: No growth thus far 05/19/2022 02:48 PM    Culture result: Heavy Acinetobacter baumannii complex 05/15/2022 07:15 PM    Culture result: Heavy Diphtheroids 05/15/2022 07:15 PM     Lab Results   Component Value Date/Time    TSH 2.880 12/12/2016 10:13 AM        Imaging:    Results from Hospital Encounter encounter on 05/14/22    XR CHEST PORT    Narrative  Reason for Visit:  HISTORY: Congestive heart failure.     TECHNIQUE: Portable AP radiograph the chest dated 5/20/2022 obtained at 2:45 AM  COMPARISON: 5/19/2022. LIMITATIONS: Overlying EKG leads. TUBES/LINES:  The patient is intubated and the endotracheal tube tip is located  6 cms above the jennifer just below the thoracic inlet. HEART AND PULMONARY VESSELS: There is moderate enlargement of the cardiac  silhouette, pulmonary vascular distention, and perivascular ill definition. These findings are consistent with mild interstitial edema. There has been  interval decrease in the edema pattern. LUNG PARENCHYMA: This examination is negative for airspace consolidation. PLEURA: Normal without larger pleural effusions. MEDIASTINUM: There is calcified plaque formation within the aortic arch and mild  aortic ectasia. The remainder the mediastinal structures are normal.    BONE/SOFT TISSUES: There are bridging right lateral osteophytes at contiguous  levels along the mid to thoracic lower thoracic spine related to diffuse  idiopathic skeletal hyperostosis. The patient is status post discectomies with  integrated graft fusions at her C5-C6 and her C6-C7 levels. Impression  1. The endotracheal tube is in satisfactory position. 2.  There has been a partial interval resolution in the pulmonary interstitial  edema pattern. Results from [place] encounter on 04/03/22    CT HEAD WO CONT    Narrative  PROCEDURE: CT HEAD WO CONT    HISTORY:Altered mental status    COMPARISON:Head CT 3 March 2022    Department policy stipulates all CT scans at this facility are performed using  dose reduction optimization techniques as appropriate to the performed exam,  including the following: Automated exposure control, adjustments of the mA  and/or KVP according to the patient size, and the use of iterative  reconstruction technique. TECHNIQUE: Without IV contrast    Bones/extracranial soft tissues:  Maxillary sinuses and right frontal sinus  remains nearly completely opacified.   Extra-axial spaces:  No hemorrhage, mass or focal fluid collection. Ventricles/sulci:  There is mild dilatation of the ventricles. Sulci are  prominent. Brain parenchyma: An area of encephalomalacia in the left frontal lobe is  showing chronic evolution without evidence of hemorrhagic conversion. No other  focal lesions identified. No mass effect. There is good gray-white differentiation. There are  inhomogeneous areas of mildly decreased density in periventricular white matter. Impression  Chronic changes which appear stable. No acute or active intracranial process.   Chronic paranasal sinus disease      5/20 intubated on ventilator we will continue current vent settings patient is supposed to go for surgery because of transaminitis elevated liver enzyme we will wait as per surgeon for sacral wound debridement and diverting loop colostomy off Levophed, improvement in AST and ALT  5/21 continue with the current vent settings ABG acceptable liver enzyme improving awaiting for the surgery

## 2022-05-21 NOTE — PROGRESS NOTES
Vancomycin Dosing Consult  Day #7 of vancomycin therapy  Consult ordered by Dr. Jose Roberto Ndiaye for this 78 y.o. female for indication of decubitus ulcer infection, sepsis. Antibiotic regimen: Vancomycin + Unasyn    Temp (24hrs), Av.1 °F (37.3 °C), Min:98.4 °F (36.9 °C), Max:100.1 °F (37.8 °C)    Recent Labs     22  0500 22  0540 22  1720 22  0525 22  0525 22  0415 22  0400   WBC 12.9* 17.2*  --   --   --  13.1*  --    CREA 1.16* 1.45* 1.40*   < > 1.70*  --    < >   BUN 48* 55*  --   --  67*  --    < >    < > = values in this interval not displayed. Est CrCl: ~45-50 ml/min; UO: 0.5 mL/kg/hr  Concomitant nephrotoxic drugs: None    Cultures:    Wound: Moderate MDR Pseudomonas aeruginosa susceptible to Zerbaxa, aminoglycosides), moderate mixed skin yasmine, moderate mixed enteric yasmine including yeast, final   Blood: CoNS in the anaerobic bottle, final   Wound, ulcer: Moderate Acinetobacter baumannii, moderate Enterococcus faecium, light >2 organisms (contraindicated), final  5/15 Wound, great toe: Heavy Acinetobacter baumannii complex (Zosyn resistant, susceptible to Unasyn), heavy Diphtheroids, final   Sputum: Rare normal respiratory yasmine, final    MRSA Swab: Not ordered, patient already received first dose of vancomycin    Target range: Trough 10-15 mcg/mL    Recent level history:  Date/Time Dose & Interval Measured Level (mcg/mL)    @ 1300 750 mg q12h 24.2    @ 1600 500 mg q12h 28.2    @ 1720 Held 24.9    @ 1740 Held 18.6    @ 0500 Held 17.6        Assessment/Plan:   Afebrile.  procal and CRP trending down  RICARDO, SCr trending down  Level remains supratherapeutic, hold dose today and check a level tomorrow AM  Antimicrobial stop date TBD

## 2022-05-21 NOTE — PROGRESS NOTES
CARDIOLOGY PROGRESS NOTE - NP    Patient seen and examined. This is a patient who is followed for new onset cardiomyopathy and elevated troponin following rapid decompensation prior to loop colostomy, sacral wound debridement, and amputation of left transmetatarsal.  Remains septic and continues to be intubated and sedated. Hemodynamically stable. No other complaints reported. Telemetry reviewed. Remains in NSR with occasional PVCs. 3 and 6 beat runs of NSVT yesterday. Unable to obtain ROS as is intubated/sedated. Current medications reviewed. Physical Examination  Vital signs are stable. Blood pressure 125/41, Pulse 61  No apparent distress. Heart is regular, rate and rhythm. Normal S1, S2, no murmurs are appreciated. Lungs are clear but diminished bilaterally. Abdomen is soft, nontender, normal bowel sounds. Extremities have mild edema. Labs reviewed: K+ 3.4    Case discussed with Dr. Farhad Cortes and our impression and recommendations are as follows:  New onset cardiomyopathy:  Echo 5/18/22 showing EF of 20-25% down from 50-55% in 4/2022. Will d/c norvasc and Lopressor and add Tier Entresto and Toprol XL 25mg daily. Will only gently replete K+ for now given addition of ARNI. Volume status is relatively stable. Will advance GDMT as able. Elevated troponin:  Peaked at 890 on the day of deterioration requiring intubation/sedation. No asa presently as required transfusion for Hgb 6.8 yesterday with blood per rectum with dislodgement of Flexi-Seal.  Continue beta blocker. Ischemic evaluation pending clinical course as remains critical presently. No statin given LFTs elevated likely due to shock. Watch trend start when able   NSVT:  None yet this morning. 3 and 6 beat runs yesterday. Continue beta blocker. Will check Mg. Will watch K+ with addition of ARNI. 4.  Sepsis:  Per primary/ID. Please do not hesitate to call if additional questions arise.

## 2022-05-21 NOTE — PROGRESS NOTES
Consult Date: 5/21/2022      Subjective      Seen and examined in ICU. Intubated and sedated.   Renal function is better with extra volume   K is low and sodium continue to fluctuate     Past Medical History:   Diagnosis Date    Anemia     Chronic kidney disease     Diabetes mellitus ([facility]     Hemiplegia affecting dominant side, post-stroke ([facility] 05/04/2022    HTN (hypertension)     Hyperkalemia     Hyperlipidemia     Ill-defined condition     Neuropathy     PAD (peripheral artery disease) (HCC)     PCI    Sciatica     Stroke ([facility] [facility]     UTI (urinary tract infection)       Past Surgical History:   Procedure Laterality Date    HX AMPUTATION Right     great toe    HX CERVICAL FUSION      HX GI      HX OTHER SURGICAL Bilateral     Stent placement    HX OTHER SURGICAL      HX VASCULAR STENT Bilateral     2 in right 1 in left    HX VASCULAR STENT Bilateral      Family History   Problem Relation Age of Onset    Cancer Mother     Diabetes Mother     Hypertension Mother       Social History     Tobacco Use    Smoking status: Never Smoker    Smokeless tobacco: Never Used   Substance Use Topics    Alcohol use: Not Currently       Current Facility-Administered Medications   Medication Dose Route Frequency Provider Last Rate Last Admin    [START ON 5/22/2022] Vancomycin - Random level to be drawn w/ AM labs 5/22   Other ONCE Kennedi Mitchell W, DO        sacubitriL-valsartan (ENTRESTO) 24-26 mg tablet 1 Tablet  1 Tablet Oral Q12H Terese Rogers E, NP   1 Tablet at 05/21/22 1402    [START ON 5/22/2022] metoprolol succinate (TOPROL-XL) XL tablet 25 mg  25 mg Oral DAILY Terese Rogers E, NP        ampicillin-sulbactam (UNASYN) 3 g in 0.9% sodium chloride (MBP/ADV) 100 mL MBP  3 g IntraVENous Q8H Norberto Lyle  mL/hr at 05/21/22 1402 3 g at 05/21/22 1402    dextrose 10% infusion 0-250 mL  0-250 mL IntraVENous PRN Israel Soto MD   125 mL at 05/19/22 0537    insulin glargine (LANTUS) injection 10 Units  10 Units SubCUTAneous QHS Israel Soto MD   10 Units at 05/20/22 2348    NOREPINephrine (LEVOPHED) 32,000 mcg in dextrose 5% 250 mL (128 mcg/mL) infusion  2 mcg/min IntraVENous TITRATE Valentin Fields MD        propofol (DIPRIVAN) 10 mg/mL infusion  0-50 mcg/kg/min IntraVENous TITRATE Valentin Fields MD 11.9 mL/hr at 05/21/22 0535 25 mcg/kg/min at 05/21/22 0535    0.9% sodium chloride infusion 250 mL  250 mL IntraVENous PRN Israel Soto MD 15 mL/hr at 05/20/22 2232 250 mL at 05/20/22 2232    sodium hypochlorite (HALF STRENGTH DAKIN'S) 0.25% irrigation (bottle)   Topical BID Senait Soto MD   Given at 05/21/22 1097    glucose chewable tablet 16 g  4 Tablet Oral PRN Senait Soto MD        glucagon (GLUCAGEN) injection 1 mg  1 mg IntraMUSCular PRN Senait Soto MD       [facility] insulin lispro (HUMALOG) injection   SubCUTAneous Q6H Israel Soto MD   2 Units at 05/21/22 1149    Vancomycin Pharmacy Dosing   Other Rx Dosing/Monitoring Car VILLASEÑOR DO        sodium chloride (NS) flush 5-40 mL  5-40 mL IntraVENous Q8H Pamela JACKSON MD   10 mL at 05/21/22 1402    acetaminophen (TYLENOL) tablet 650 mg  650 mg Oral Q6H PRN Pamela JACKSON MD   650 mg at 05/18/22 0056    Or    acetaminophen (TYLENOL) suppository 650 mg  650 mg Rectal Q6H PRN Jessy Scott MD   650 mg at 05/16/22 2223    polyethylene glycol (MIRALAX) packet 17 g  17 g Oral DAILY PRN Pamela JACKSON MD        ondansetron (ZOFRAN ODT) tablet 4 mg  4 mg Oral Q8H PRN Pamela JACKSON MD        Or    ondansetron (ZOFRAN) injection 4 mg  4 mg IntraVENous Q6H PRN Jessy Scott MD        [Held by provider] enoxaparin (LOVENOX) injection 40 mg  40 mg SubCUTAneous DAILY Pamela JACKSON MD   40 mg at 05/17/22 1006    famotidine (PEPCID) tablet 20 mg  20 mg Oral BID Pamela JACKSON MD   20 mg at 05/21/22 7710    acidophilus-pectin, citrus tablet 2 Tablet  2 Tablet Oral DAILY Emerson, Santos Doyle MD   2 Tablet at 05/21/22 0819    albuterol-ipratropium (DUO-NEB) 2.5 MG-0.5 MG/3 ML  3 mL Nebulization Q6H PRN Yoni Ling MD        artificial saliva (MOUTH KOTE) 1 Spray  1 Spray Oral TID Wilner JACKSON MD   1 [place] at 05/21/22 0820    aspirin delayed-release tablet 81 mg  81 mg Oral DAILY Wilner JACKSON MD   81 mg at 05/21/22 0819    brimonidine (ALPHAGAN) 0.2 % ophthalmic solution 1 Drop  1 Drop Both Eyes TID Yoni Ling MD   1 Drop at 05/21/22 0820    cholestyramine-aspartame (QUESTRAN LIGHT) packet 4 g  4 g Oral BID WITH MEALS Yoni Ling MD   4 g at 05/21/22 0818    [Held by provider] fenofibrate nanocrystallized (TRICOR) tablet 48 mg  48 mg Oral DAILY Wilner JACKSON MD   48 mg at 05/19/22 8954    ferrous sulfate tablet 325 mg  325 mg Oral BID Wilner JACKSON MD   325 mg at 05/21/22 0442    gabapentin (NEURONTIN) capsule 300 mg  300 mg Oral BID Yoni Ling MD   300 mg at 05/21/22 0818    hydrALAZINE (APRESOLINE) tablet 50 mg  50 mg Oral TID Wilner JACKSON MD   50 mg at 05/20/22 2228    insulin glargine (LANTUS) injection 50 Units  50 Units SubCUTAneous DAILY Yoni Ling MD   50 Units at 05/21/22 0818    latanoprost (XALATAN) 0.005 % ophthalmic solution 1 Drop  1 Drop Right Eye QPM Yoni Ling MD   1 Drop at 05/20/22 2000    traMADoL (ULTRAM) tablet 25 mg  25 mg Oral Q12H PRN Yoni Ling MD   25 mg at 05/18/22 0056        Review of Systems   Unable to perform ROS: Dementia       Objective     Vital signs for last 24 hours:  Visit Vitals  BP (!) 113/53 (BP 1 Location: Left upper arm, BP Patient Position: At rest)   Pulse 66   Temp 98.1 °F (36.7 °C)   Resp 13   Ht 5' 7.01\" (1.702 m)   Wt 86.4 kg (190 lb 7.6 oz)   SpO2 100%   Breastfeeding No   BMI 29.83 kg/m²       Intake/Output Summary (Last 24 hours) at 5/21/2022 1606  Last data filed at 5/21/2022 1500  Gross per 24 hour   Intake 3105.2 ml   Output 1400 ml   Net 1705.2 ml Component Ref Range & Units 5/21/22 0500 5/20/22 0540 5/19/22 1720 5/19/22 0525 5/19/22 0415 5/19/22 0400 5/18/22 1600   Sodium 136 - 145 mmol/L 148 High   144   146 High    147 High   144    Potassium 3.5 - 5.1 mmol/L 3.4 Low   3.7   4.0   4.0  5.0    Chloride 97 - 108 mmol/L 117 High   118 High    117 High    118 High   114 High     CO2 21 - 32 mmol/L 22  19 Low    19 Low    21  23    Anion gap 5 - 15 mmol/L 9  7   10   8  7    Glucose 65 - 100 mg/dL 232 High   240 High    67   68  174 High     BUN 6 - 20 mg/dL 48 High   55 High    67 High    65 High   68 High     Creatinine 0.55 - 1.02 mg/dL 1.16 High   1.45 High   1.40 High   1.70 High    1.67 High   1.76 High     BUN/Creatinine ratio 12 - 20   41 High   38 High    39 High    39 High   39 High     GFR est AA >60 ml/min/1.73m2 55 Low   43 Low    36 Low    36 Low   34 Low     GFR est non-AA >60 ml/min/1.73m2 46 Low   35 Low    29 Low    30 Low   28 Low            Current Shift: 05/21 0701 - 05/21 1900  In: 1115.2 [I.V.:395.2]  Out: 300 [Urine:300]  Last 3 Shifts: 05/19 1901 - 05/21 0700  In: 7715 [I.V.:834.5]  Out: 2105 [Urine:1705; Drains:400]  Physical Exam  Constitutional:       Appearance: She is obese. She is ill-appearing. HENT:      Head: Normocephalic and atraumatic. Mouth/Throat:      Mouth: Mucous membranes are moist.   Cardiovascular:      Rate and Rhythm: Regular rhythm. Tachycardia present. Pulmonary:      Effort: Pulmonary effort is normal.   Skin:     Coloration: Skin is not jaundiced. Findings: No bruising.      Intubated and sedated  Bell  Rectal tube  Blood in rectal bag      Data Review:   Recent Results (from the past 24 hour(s))   GLUCOSE, POC    Collection Time: 05/20/22  5:11 PM   Result Value Ref Range    Glucose (POC) 267 (H) 65 - 117 mg/dL    Performed by Friday Miriam Macias, RANDOM    Collection Time: 05/20/22  5:40 PM   Result Value Ref Range    Vancomycin, random 18.6 ug/mL   GLUCOSE, POC    Collection Time: 05/20/22 11:39 PM   Result Value Ref Range    Glucose (POC) 236 (H) 65 - 117 mg/dL    Performed by Escobar Julian    BLOOD GAS, ARTERIAL    Collection Time: 05/21/22  4:30 AM   Result Value Ref Range    pH 7.41 7.35 - 7.45      PCO2 32 (L) 35 - 45 mmHg    PO2 99 75 - 100 mmHg    O2 SAT 98 >95 %    BICARBONATE 21 (L) 22 - 26 mmol/L    BASE DEFICIT 3.9 (H) 0 - 2 mmol/L    O2 METHOD VENT      FIO2 30.0 %    MODE Assist Control/Volume Control      Tidal volume 500      SET RATE 12      EPAP/CPAP/PEEP 5.0      SITE Right Radial      EVELIN'S TEST PASS     CBC WITH AUTOMATED DIFF    Collection Time: 05/21/22  5:00 AM   Result Value Ref Range    WBC 12.9 (H) 3.6 - 11.0 K/uL    RBC 3.00 (L) 3.80 - 5.20 M/uL    HGB 8.5 (L) 11.5 - 16.0 g/dL    HCT 26.9 (L) 35.0 - 47.0 %    MCV 89.7 80.0 - 99.0 FL    MCH 28.3 26.0 - 34.0 PG    MCHC 31.6 30.0 - 36.5 g/dL    RDW 17.2 (H) 11.5 - 14.5 %    PLATELET 689 639 - 030 K/uL    MPV 11.2 8.9 - 12.9 FL    NRBC 6.0 (H) 0.0  WBC    ABSOLUTE NRBC 0.77 (H) 0.00 - 0.01 K/uL    NEUTROPHILS 74 32 - 75 %    LYMPHOCYTES 14 12 - 49 %    MONOCYTES 6 5 - 13 %    EOSINOPHILS 2 0 - 7 %    BASOPHILS 1 0 - 1 %    IMMATURE GRANULOCYTES 3 (H) 0 - 0.5 %    ABS. NEUTROPHILS 9.5 (H) 1.8 - 8.0 K/UL    ABS. LYMPHOCYTES 1.8 0.8 - 3.5 K/UL    ABS. MONOCYTES 0.8 0.0 - 1.0 K/UL    ABS. EOSINOPHILS 0.3 0.0 - 0.4 K/UL    ABS. BASOPHILS 0.1 0.0 - 0.1 K/UL    ABS. IMM.  GRANS. 0.3 (H) 0.00 - 0.04 K/UL    DF AUTOMATED     METABOLIC PANEL, COMPREHENSIVE    Collection Time: 05/21/22  5:00 AM   Result Value Ref Range    Sodium 148 (H) 136 - 145 mmol/L    Potassium 3.4 (L) 3.5 - 5.1 mmol/L    Chloride 117 (H) 97 - 108 mmol/L    CO2 22 21 - 32 mmol/L    Anion gap 9 5 - 15 mmol/L    Glucose 232 (H) 65 - 100 mg/dL    BUN 48 (H) 6 - 20 mg/dL    Creatinine 1.16 (H) 0.55 - 1.02 mg/dL    BUN/Creatinine ratio 41 (H) 12 - 20      GFR est AA 55 (L) >60 ml/min/1.73m2    GFR est non-AA 46 (L) >60 ml/min/1.73m2    Calcium 8.1 (L) 8.5 - 10.1 mg/dL    Bilirubin, total 0.5 0.2 - 1.0 mg/dL    AST (SGOT) 574 (H) 15 - 37 U/L    ALT (SGPT) 1,313 (H) 12 - 78 U/L    Alk. phosphatase 114 45 - 117 U/L    Protein, total 6.1 (L) 6.4 - 8.2 g/dL    Albumin 1.7 (L) 3.5 - 5.0 g/dL    Globulin 4.4 (H) 2.0 - 4.0 g/dL    A-G Ratio 0.4 (L) 1.1 - 2.2     PROTHROMBIN TIME + INR    Collection Time: 05/21/22  5:00 AM   Result Value Ref Range    Prothrombin time 19.3 (H) 11.9 - 14.6 sec    INR 1.6 (H) 0.9 - 1.1     C REACTIVE PROTEIN, QT    Collection Time: 05/21/22  5:00 AM   Result Value Ref Range    C-Reactive protein 13.00 (H) 0.00 - 0.60 mg/dL   VANCOMYCIN, RANDOM    Collection Time: 05/21/22  5:00 AM   Result Value Ref Range    Vancomycin, random 17.6 ug/mL   MAGNESIUM    Collection Time: 05/21/22  5:15 AM   Result Value Ref Range    Magnesium 2.5 (H) 1.6 - 2.4 mg/dL   GLUCOSE, POC    Collection Time: 05/21/22 11:30 AM   Result Value Ref Range    Glucose (POC) 225 (H) 65 - 117 mg/dL    Performed by Alvenia Brain          XR CHEST PORT   Final Result   No significant change allowing for difference in technique and   positioning. Single portable AP view compared to yesterday. Rotation to the right. Monitoring   equipment overlies the body. Suboptimal inspiratory film compromises   visualization of the lower lung fields. Mild cardiomegaly. The tip of the ET tube is approximately 3 cm above the jennifer. Mild basal interstitial edema. No new consolidation. No pleural effusion or   pneumothorax. XR CHEST PORT   Final Result   1. The endotracheal tube is in satisfactory position. 2.  There has been a partial interval resolution in the pulmonary interstitial   edema pattern. XR CHEST PORT   Final Result   Ill-defined haziness in the mid to lower lung zones suspect for mild   atelectatic changes and/or interstitial fluid or pleural fluid. US ABD LTD   Final Result   1. Question pericholecystic fluid.  No identified gallstones or sludge. 2. Right pleural effusion. 3. Increased right renal echotexture for medical renal disease. XR CHEST PORT   Final Result   Intubation. Findings suggesting hydrostatic edema. XR CHEST PORT   Final Result   No evidence of an acute cardiopulmonary process. XR CHEST PORT    (Results Pending)        Patient Active Problem List   Diagnosis Code    HTN (hypertension) I10    Hyperlipidemia E78.5    Neuropathy G62.9    Sciatica M54.30    Diabetes mellitus with neurological manifestations, uncontrolled [id number]    Infection of nailbed of toe of left foot L03.032    PAD (peripheral artery disease) (HCC) I73.9    Hypercalcemia E83.52    DM type 2 causing vascular disease (HCC) E11.59    Type 2 diabetes mellitus with nephropathy ([facility]) E11.21    Acute osteomyelitis of ankle or foot ([address] M86.179    Diabetic peripheral neuropathy ([address] E11.42    Spinal stenosis in cervical region M48.02    Ischemia of both lower extremities I99.8    Pain in both lower legs M79.661, M79.662    CVA (cerebral vascular accident) ([address] I63.9    Elevated troponin R77.8    Hyperkalemia E87.5    UTI (urinary tract infection) N39.0    Sepsis (HCC) A41.9    RICARDO (acute kidney injury) ([address] N17.9    AMS (altered mental status) R41.82    Sacral ulcer ([address] L98.429        DIAGNOSES:  1. RICARDO, resolved  2. Hypokalemia  3. Chronic kidney disease  4. Sacral decubitus  5. CVA/dementia  6. Acute on chronic anemia-blood loss  7.  Liver failure-shock liver       Plan -kcl 40 meq x 1 dose   Check labs in A.M

## 2022-05-22 NOTE — PROGRESS NOTES
Consult Date: 5/22/2022      Subjective      Seen and examined in ICU. Intubated and sedated.   Renal function is better with extra volume   K is normal with supplement   Both sisters qre bed side   Urine out put remains o.k at 1.35 L     Past Medical History:   Diagnosis Date    Anemia     Chronic kidney disease     [facility]     Hemiplegia affecting dominant side, post-stroke ([facility] 05/04/2022    HTN (hypertension)     Hyperkalemia     Hyperlipidemia     Ill-defined condition     Neuropathy     PAD (peripheral artery disease) (HCC)     PCI    Sciatica     Stroke ([facility] [facility]     UTI (urinary tract infection)       Past Surgical History:   Procedure Laterality Date    HX AMPUTATION Right     great toe    HX CERVICAL FUSION      HX GI      HX OTHER SURGICAL Bilateral     Stent placement    HX OTHER SURGICAL      HX VASCULAR STENT Bilateral     2 in right 1 in left    HX VASCULAR STENT Bilateral      Family History   Problem Relation Age of Onset    Cancer Mother     Diabetes Mother     Hypertension Mother       Social History     Tobacco Use    Smoking status: Never Smoker    Smokeless tobacco: Never Used   Substance Use Topics    Alcohol use: Not Currently       Current Facility-Administered Medications   Medication Dose Route Frequency Provider Last Rate Last Admin    [START ON 5/23/2022] Vancomycin - Please draw random level 5/23 @ 0400   Other ONCE Noé Ibrahim W, DO        hydrALAZINE (APRESOLINE) tablet 25 mg  25 mg Oral TID Terese Rogers NP        potassium chloride (K-DUR, KLOR-CON M20) SR tablet 40 mEq  40 mEq Oral ONCE Terese Rogers NP        sacubitriL-valsartan (ENTRESTO) 24-26 mg tablet 1 Tablet  1 Tablet Oral Q12H Terese Rogers NP   1 Tablet at 05/22/22 0817    metoprolol succinate (TOPROL-XL) XL tablet 25 mg  25 mg Oral DAILY Terese Rogers NP   25 mg at 05/22/22 0817    ampicillin-sulbactam (UNASYN) 3 g in 0.9% sodium chloride (MBP/ADV) 100 mL MBP 3 g IntraVENous Q8H Gail Cameron  mL/hr at 05/22/22 0549 3 g at 05/22/22 0549    dextrose 10% infusion 0-250 mL  0-250 mL IntraVENous PRN Israel Soto MD   125 mL at 05/19/22 0537    insulin glargine (LANTUS) injection 10 Units  10 Units SubCUTAneous QHS Israel Soto MD   10 Units at 05/21/22 2301    NOREPINephrine (LEVOPHED) 32,000 mcg in dextrose 5% 250 mL (128 mcg/mL) infusion  2 mcg/min IntraVENous TITRATE Ericka Fields MD        propofol (DIPRIVAN) 10 mg/mL infusion  0-50 mcg/kg/min IntraVENous TITRATE Ericka Fields MD 11.9 mL/hr at 05/22/22 1007 25 mcg/kg/min at 05/22/22 1007    0.9% sodium chloride infusion 250 mL  250 mL IntraVENous PRN Bebeto Moses MD 15 mL/hr at 05/22/22 0820 Rate Verify at 05/22/22 0820    sodium hypochlorite (HALF STRENGTH DAKIN'S) 0.25% irrigation (bottle)   Topical BID Bebeto Moses MD   Given at 05/22/22 0157    glucose chewable tablet 16 g  4 Tablet Oral PRN Roel Soto MD        glucagon (GLUCAGEN) injection 1 mg  1 mg IntraMUSCular PRN Roel Soto MD       [facility] insulin lispro (HUMALOG) injection   SubCUTAneous Q6H Bebeto Moses MD   2 Units at 05/22/22 1128    Vancomycin Pharmacy Dosing   Other Rx Dosing/Monitoring Virginia Marr DO        sodium chloride (NS) flush 5-40 mL  5-40 mL IntraVENous Q8H Melia Figueroa MD   10 mL at 05/22/22 0550    acetaminophen (TYLENOL) tablet 650 mg  650 mg Oral Q6H PRN Anthony JACKSON MD   650 mg at 05/22/22 4518    Or    acetaminophen (TYLENOL) suppository 650 mg  650 mg Rectal Q6H PRN Melia Figueroa MD   650 mg at 05/16/22 2223    polyethylene glycol (MIRALAX) packet 17 g  17 g Oral DAILY PRN Anthony JACKSON MD        ondansetron (ZOFRAN ODT) tablet 4 mg  4 mg Oral Q8H PRN Anthony JACKSON MD        Or    ondansetron (ZOFRAN) injection 4 mg  4 mg IntraVENous Q6H PRN Melia Figueroa MD        [Held by provider] enoxaparin (LOVENOX) injection 40 mg  40 mg SubCUTAneous DAILY Katheryn Murdock MD   40 mg at 05/17/22 1006    famotidine (PEPCID) tablet 20 mg  20 mg Oral BID Alexandra JACKSON MD   20 mg at 05/22/22 9963    acidophilus-pectin, citrus tablet 2 Tablet  2 Tablet Oral DAILY Alexandra JACKSON MD   2 Tablet at 05/22/22 0817    albuterol-ipratropium (DUO-NEB) 2.5 MG-0.5 MG/3 ML  3 mL Nebulization Q6H PRN Katheryn Murdock MD        artificial saliva (MOUTH KOTE) 1 Spray  1 Spray Oral TID Katheryn Murdock MD   1 [place] at 05/22/22 0818    aspirin delayed-release tablet 81 mg  81 mg Oral DAILY Alexandra JACKSON MD   81 mg at 05/22/22 0817    brimonidine (ALPHAGAN) 0.2 % ophthalmic solution 1 Drop  1 Drop Both Eyes TID Katheryn Murdock MD   1 Drop at 05/22/22 0818    cholestyramine-aspartame (QUESTRAN LIGHT) packet 4 g  4 g Oral BID WITH MEALS Alexandra JACKSON MD   4 g at 05/22/22 0817    [Held by provider] fenofibrate nanocrystallized (TRICOR) tablet 48 mg  48 mg Oral DAILY Katheryn Murdock MD   48 mg at 05/19/22 3333    ferrous sulfate tablet 325 mg  325 mg Oral BID Alexandra JACKSON MD   325 mg at 05/22/22 0817    gabapentin (NEURONTIN) capsule 300 mg  300 mg Oral BID Alexandra JACKSON MD   300 mg at 05/22/22 0817    insulin glargine (LANTUS) injection 50 Units  50 Units SubCUTAneous DAILY Katheryn Murdock MD   50 Units at 05/22/22 0817    latanoprost (XALATAN) 0.005 % ophthalmic solution 1 Drop  1 Drop Right Eye QPM Katheryn Murdock MD   1 Drop at 05/21/22 1825    traMADoL (ULTRAM) tablet 25 mg  25 mg Oral Q12H PRN Alexandra JACKSON MD   25 mg at 05/18/22 0056        Review of Systems   Unable to perform ROS: Intubated       Objective     Vital signs for last 24 hours:  Visit Vitals  BP (!) 124/53   Pulse 66   Temp 97.5 °F (36.4 °C)   Resp 13   Ht 5' 7.01\" (1.702 m)   Wt 85.9 kg (189 lb 6 oz)   SpO2 99%   Breastfeeding No   BMI 29.65 kg/m²       Intake/Output Summary (Last 24 hours) at 5/22/2022 1336  Last data filed at 5/22/2022 1200  Gross per 24 hour   Intake 3092.61 ml   Output 1350 ml   Net 1742.61 ml              Current Shift: 05/22 0701 - 05/22 1900  In: 567.6 [I.V.:207.6]  Out: 450 [Urine:450]  Last 3 Shifts: 05/20 1901 - 05/22 0700  In: 5050.1 [I.V.:1870.1]  Out: 1700 [Urine:1700]  Physical Exam  Constitutional:       Appearance: She is obese. She is ill-appearing. HENT:      Head: Normocephalic and atraumatic. Mouth/Throat:      Mouth: Mucous membranes are moist.   Cardiovascular:      Rate and Rhythm: Regular rhythm. Tachycardia present. Pulmonary:      Effort: Pulmonary effort is normal.   Skin:     Coloration: Skin is not jaundiced. Findings: No bruising. Sodium 136 - 145 mmol/L 147 High   148 High   144   146 High    147 High     Potassium 3.5 - 5.1 mmol/L 3.7  3.4 Low   3.7   4.0   4.0    Chloride 97 - 108 mmol/L 119 High   117 High   118 High    117 High    118 High     CO2 21 - 32 mmol/L 22  22  19 Low    19 Low    21    Anion gap 5 - 15 mmol/L 6  9  7   10   8    Glucose 65 - 100 mg/dL 219 High   232 High   240 High    67   68    BUN 6 - 20 mg/dL 43 High   48 High   55 High    67 High    65 High     Creatinine 0.55 - 1.02 mg/dL 1.09 High   1.16 High   1.45 High   1.40 High   1.70 High    1.67 High     BUN/Creatinine ratio 12 - 20   39 High   41 High   38 High    39 High    39 High     GFR est AA >60 ml/min/1.73m2 59 Low   55 Low   43 Low    36 Low    36 Low     GFR est non-AA >60 ml/min/1.73m2 49 Low   46 Low   35 Low    29 Low    30 Low     Calcium 8.5 - 10.1 mg/dL 8.0 Low   8.1 Low   8.6   8.8   9.2    Bilirubin, total 0.2 - 1.0 mg/dL 0.5  0.5  0.4   0.5  0.5  0.5    AST (SGOT) 15 - 37 U/L 158 High   574 High   802 High    1,420 High   1,463 High   1,458 High     ALT (SGPT) 12 - 78 U/L 833 High   1,313 High   1,361 High  CM   1,551 High   1,461 High   1,467 High     Alk.  phosphatase 45 - 117 U/L 106  114  99   91  88  89        Data Review:   Recent Results (from the past 24 hour(s))   GLUCOSE, POC Collection Time: 05/21/22  5:20 PM   Result Value Ref Range    Glucose (POC) 258 (H) 65 - 117 mg/dL    Performed by Santa Hernandez    GLUCOSE, POC    Collection Time: 05/21/22 10:31 PM   Result Value Ref Range    Glucose (POC) 246 (H) 65 - 117 mg/dL    Performed by Gaby Sagastume    BLOOD GAS, ARTERIAL    Collection Time: 05/22/22  3:32 AM   Result Value Ref Range    pH 7.41 7.35 - 7.45      PCO2 28 (L) 35 - 45 mmHg    PO2 85 75 - 100 mmHg    O2 SAT 98 >95 %    BICARBONATE 19 (L) 22 - 26 mmol/L    BASE DEFICIT 6.4 (H) 0 - 2 mmol/L    O2 METHOD VENT      FIO2 30.0 %    MODE Assist Control/Volume Control      Tidal volume 500      SET RATE 12      IPAP/PIP 0      EPAP/CPAP/PEEP 5.0      SITE Arterial Line      EVELIN'S TEST PASS     CBC WITH AUTOMATED DIFF    Collection Time: 05/22/22  4:00 AM   Result Value Ref Range    WBC 17.3 (H) 3.6 - 11.0 K/uL    RBC 3.12 (L) 3.80 - 5.20 M/uL    HGB 8.7 (L) 11.5 - 16.0 g/dL    HCT 28.4 (L) 35.0 - 47.0 %    MCV 91.0 80.0 - 99.0 FL    MCH 27.9 26.0 - 34.0 PG    MCHC 30.6 30.0 - 36.5 g/dL    RDW 17.5 (H) 11.5 - 14.5 %    PLATELET 737 285 - 777 K/uL    MPV 11.5 8.9 - 12.9 FL    NRBC 1.3 (H) 0.0  WBC    ABSOLUTE NRBC 0.23 (H) 0.00 - 0.01 K/uL    NEUTROPHILS 77 (H) 32 - 75 %    LYMPHOCYTES 12 12 - 49 %    MONOCYTES 6 5 - 13 %    EOSINOPHILS 2 0 - 7 %    BASOPHILS 1 0 - 1 %    IMMATURE GRANULOCYTES 2 (H) 0 - 0.5 %    ABS. NEUTROPHILS 13.5 (H) 1.8 - 8.0 K/UL    ABS. LYMPHOCYTES 2.0 0.8 - 3.5 K/UL    ABS. MONOCYTES 1.1 (H) 0.0 - 1.0 K/UL    ABS. EOSINOPHILS 0.3 0.0 - 0.4 K/UL    ABS. BASOPHILS 0.1 0.0 - 0.1 K/UL    ABS. IMM.  GRANS. 0.3 (H) 0.00 - 0.04 K/UL    DF AUTOMATED     METABOLIC PANEL, COMPREHENSIVE    Collection Time: 05/22/22  4:00 AM   Result Value Ref Range    Sodium 147 (H) 136 - 145 mmol/L    Potassium 3.7 3.5 - 5.1 mmol/L    Chloride 119 (H) 97 - 108 mmol/L    CO2 22 21 - 32 mmol/L    Anion gap 6 5 - 15 mmol/L    Glucose 219 (H) 65 - 100 mg/dL    BUN 43 (H) 6 - 20 mg/dL Creatinine 1.09 (H) 0.55 - 1.02 mg/dL    BUN/Creatinine ratio 39 (H) 12 - 20      GFR est AA 59 (L) >60 ml/min/1.73m2    GFR est non-AA 49 (L) >60 ml/min/1.73m2    Calcium 8.0 (L) 8.5 - 10.1 mg/dL    Bilirubin, total 0.5 0.2 - 1.0 mg/dL    AST (SGOT) 158 (H) 15 - 37 U/L    ALT (SGPT) 833 (H) 12 - 78 U/L    Alk. phosphatase 106 45 - 117 U/L    Protein, total 5.8 (L) 6.4 - 8.2 g/dL    Albumin 1.6 (L) 3.5 - 5.0 g/dL    Globulin 4.2 (H) 2.0 - 4.0 g/dL    A-G Ratio 0.4 (L) 1.1 - 2.2     VANCOMYCIN, RANDOM    Collection Time: 05/22/22  4:00 AM   Result Value Ref Range    Vancomycin, random 14.3 ug/mL   PROTHROMBIN TIME + INR    Collection Time: 05/22/22  4:00 AM   Result Value Ref Range    Prothrombin time 19.8 (H) 11.9 - 14.6 sec    INR 1.7 (H) 0.9 - 1.1     GLUCOSE, POC    Collection Time: 05/22/22 11:24 AM   Result Value Ref Range    Glucose (POC) 214 (H) 65 - 117 mg/dL    Performed by Santa Hernandez          XR CHEST PORT   Final Result   No significant change allowing for difference in technique and   positioning. Single portable AP view compared to yesterday. Suboptimal inspiratory film   compromises visualization of the lower lung fields. Monitoring equipment   overlies the body. The tip of the tube is approximately 3 cm above the jennifer. Mild apparent cardiomegaly. Left heart border and left diaphragm obscured. Hazy airspace opacification both lung bases may be a combination of atelectasis,   pneumonia, or edema. No large effusion. Negative for pneumothorax. XR CHEST PORT   Final Result   No significant change allowing for difference in technique and   positioning. Single portable AP view compared to yesterday. Rotation to the right. Monitoring   equipment overlies the body. Suboptimal inspiratory film compromises   visualization of the lower lung fields. Mild cardiomegaly. The tip of the ET tube is approximately 3 cm above the jennifer. Mild basal interstitial edema.  No new consolidation. No pleural effusion or   pneumothorax. XR CHEST PORT   Final Result   1. The endotracheal tube is in satisfactory position. 2.  There has been a partial interval resolution in the pulmonary interstitial   edema pattern. XR CHEST PORT   Final Result   Ill-defined haziness in the mid to lower lung zones suspect for mild   atelectatic changes and/or interstitial fluid or pleural fluid. US ABD LTD   Final Result   1. Question pericholecystic fluid. No identified gallstones or sludge. 2. Right pleural effusion. 3. Increased right renal echotexture for medical renal disease. XR CHEST PORT   Final Result   Intubation. Findings suggesting hydrostatic edema. XR CHEST PORT   Final Result   No evidence of an acute cardiopulmonary process. XR CHEST PORT    (Results Pending)        Patient Active Problem List   Diagnosis Code    HTN (hypertension) I10    Hyperlipidemia E78.5    Neuropathy G62.9    Sciatica M54.30    Diabetes mellitus with neurological manifestations, uncontrolled [id number]    Infection of nailbed of toe of left foot L03.032    PAD (peripheral artery disease) ([facility]) I73.9    Hypercalcemia E83.52    DM type 2 causing vascular disease ([facility]) E11.59    Type 2 diabetes mellitus with nephropathy ([facility]) E11.21    Acute osteomyelitis of ankle or foot ([address] M86.179    Diabetic peripheral neuropathy ([address] E11.42    Spinal stenosis in cervical region M48.02    Ischemia of both lower extremities I99.8    Pain in both lower legs M79.661, M79.662    CVA (cerebral vascular accident) ([address] I63.9    Elevated troponin R77.8    Hyperkalemia E87.5    UTI (urinary tract infection) N39.0    Sepsis ([facility]) A41.9    RICARDO (acute kidney injury) ([address] N17.9    AMS (altered mental status) R41.82    Sacral ulcer ([address] L98.429        DIAGNOSES:  1. RICARDO, resolved  2. Hypokalemia-- resolved. 3. Chronic kidney disease-STAGE-3   4. Sacral decubitus  5. CVA/dementia  6.  Acute on chronic anemia-blood loss  7. Liver failure-shock liver with improving LFT's        Plan   C/w  Present RX.   Monitor I&O's   Daily labs   Thank you

## 2022-05-22 NOTE — PROGRESS NOTES
IMPRESSION:   1. Acute hypoxic respiratory failure remains on the ventilator  2. Sacral decubiti ulcer with Acinetobacter and Enterococcus  3. Severe sepsis with septic shock resolved on no pressors  4. Left foot wound  5. Transaminitis continue to improve  6. Shock liver  7. Hypokalemia repleted  8. Symptomatic anemia stable after transfusion  9. Cardiomyopathy  10. Mild pulmonary edema      RECOMMENDATIONS/PLAN:   1. ICU monitoring  1. Ventilator for mechanical life support and prevent respiratory arrest with protective lung strategies  2. Maintain ventilator until surgical procedures completed  3. Liver enzymes improved eventually to be scheduled for diverting loop colostomy sacral wound debridement and left foot wound closure,   4. Severe sepsis with decubiti ulcer   5. Patient is on Unasyn  6. Chest x-ray shows mild congestive changes with fluid in the minor fissure we will give 1 dose Lasix today  7. Anemia hemoglobin able after transfusion 5/20  8. We will give 1 dose potassium with Lasix being given today  9. [x] High complexity decision making was performed  [x] See my orders for details  HPI   77-year-old lady came in because of leg pain past medical history of hypertension diabetes mellitus peripheral vascular disease CVA she is bedbound she has leg wound and also has sacral decubiti ulcer and she was scheduled for laparoscopic colostomy and sacral wound debridement and amputation of the left tarsometatarsal because of wound infection but her condition got worse her liver enzyme was elevated INR was also elevated she was intubated transferred to ICU was getting vancomycin and Unasyn started on Levophed because of low blood pressure.     PMH:  has a past medical history of Anemia, Chronic kidney disease, Diabetes mellitus ([address] Hemiplegia affecting dominant side, post-stroke ([address] (05/04/2022), HTN (hypertension), Hyperkalemia, Hyperlipidemia, Ill-defined condition, Neuropathy, PAD (peripheral artery disease) ([facility] [address] Sciatica, Stroke ([facility] [address] and UTI (urinary tract infection). PSH:   has a past surgical history that includes hx other surgical (Bilateral); hx amputation (Right); hx other surgical; hx cervical fusion; hx vascular stent (Bilateral); hx vascular stent (Bilateral); and hx gi. FHX: family history includes Cancer in her mother; Diabetes in her mother; Hypertension in her mother. SHX:  reports that she has never smoked. She has never used smokeless tobacco. She reports previous alcohol use. She reports that she does not use drugs.     ALL: No Known Allergies     MEDS:   [x] Reviewed - As Below   [] Not reviewed    Current Facility-Administered Medications   Medication    sacubitriL-valsartan (ENTRESTO) 24-26 mg tablet 1 Tablet    metoprolol succinate (TOPROL-XL) XL tablet 25 mg    ampicillin-sulbactam (UNASYN) 3 g in 0.9% sodium chloride (MBP/ADV) 100 mL MBP    dextrose 10% infusion 0-250 mL    insulin glargine (LANTUS) injection 10 Units    NOREPINephrine (LEVOPHED) 32,000 mcg in dextrose 5% 250 mL (128 mcg/mL) infusion    propofol (DIPRIVAN) 10 mg/mL infusion    0.9% sodium chloride infusion 250 mL    sodium hypochlorite (HALF STRENGTH DAKIN'S) 0.25% irrigation (bottle)    glucose chewable tablet 16 g    glucagon (GLUCAGEN) injection 1 mg    insulin lispro (HUMALOG) injection    Vancomycin Pharmacy Dosing    sodium chloride (NS) flush 5-40 mL    acetaminophen (TYLENOL) tablet 650 mg    Or    acetaminophen (TYLENOL) suppository 650 mg    polyethylene glycol (MIRALAX) packet 17 g    ondansetron (ZOFRAN ODT) tablet 4 mg    Or    ondansetron (ZOFRAN) injection 4 mg    [Held by provider] enoxaparin (LOVENOX) injection 40 mg    famotidine (PEPCID) tablet 20 mg    acidophilus-pectin, citrus tablet 2 Tablet    albuterol-ipratropium (DUO-NEB) 2.5 MG-0.5 MG/3 ML    artificial saliva (MOUTH KOTE) 1 Spray    aspirin delayed-release tablet 81 mg    brimonidine (ALPHAGAN) 0.2 % ophthalmic solution 1 Drop    cholestyramine-aspartame (QUESTRAN LIGHT) packet 4 g    [Held by provider] fenofibrate nanocrystallized (TRICOR) tablet 48 mg    ferrous sulfate tablet 325 mg    gabapentin (NEURONTIN) capsule 300 mg    hydrALAZINE (APRESOLINE) tablet 50 mg    insulin glargine (LANTUS) injection 50 Units    latanoprost (XALATAN) 0.005 % ophthalmic solution 1 Drop    traMADoL (ULTRAM) tablet 25 mg      MAR reviewed and pertinent medications noted or modified as needed   Current Facility-Administered Medications   Medication    sacubitriL-valsartan (ENTRESTO) 24-26 mg tablet 1 Tablet    metoprolol succinate (TOPROL-XL) XL tablet 25 mg    ampicillin-sulbactam (UNASYN) 3 g in 0.9% sodium chloride (MBP/ADV) 100 mL MBP    dextrose 10% infusion 0-250 mL    insulin glargine (LANTUS) injection 10 Units    NOREPINephrine (LEVOPHED) 32,000 mcg in dextrose 5% 250 mL (128 mcg/mL) infusion    propofol (DIPRIVAN) 10 mg/mL infusion    0.9% sodium chloride infusion 250 mL    sodium hypochlorite (HALF STRENGTH DAKIN'S) 0.25% irrigation (bottle)    glucose chewable tablet 16 g    glucagon (GLUCAGEN) injection 1 mg    insulin lispro (HUMALOG) injection    Vancomycin Pharmacy Dosing    sodium chloride (NS) flush 5-40 mL    acetaminophen (TYLENOL) tablet 650 mg    Or    acetaminophen (TYLENOL) suppository 650 mg    polyethylene glycol (MIRALAX) packet 17 g    ondansetron (ZOFRAN ODT) tablet 4 mg    Or    ondansetron (ZOFRAN) injection 4 mg    [Held by provider] enoxaparin (LOVENOX) injection 40 mg    famotidine (PEPCID) tablet 20 mg    acidophilus-pectin, citrus tablet 2 Tablet    albuterol-ipratropium (DUO-NEB) 2.5 MG-0.5 MG/3 ML    artificial saliva (MOUTH KOTE) 1 Spray    aspirin delayed-release tablet 81 mg    brimonidine (ALPHAGAN) 0.2 % ophthalmic solution 1 Drop    cholestyramine-aspartame (QUESTRAN LIGHT) packet 4 g    [Held by provider] fenofibrate nanocrystallized (TRICOR) tablet 48 mg    ferrous sulfate tablet 325 mg    gabapentin (NEURONTIN) capsule 300 mg    hydrALAZINE (APRESOLINE) tablet 50 mg    insulin glargine (LANTUS) injection 50 Units    latanoprost (XALATAN) 0.005 % ophthalmic solution 1 Drop    traMADoL (ULTRAM) tablet 25 mg      PMH:  has a past medical history of Anemia, Chronic kidney disease, Diabetes mellitus ([address] Hemiplegia affecting dominant side, post-stroke ([facility] [address] (2022), HTN (hypertension), Hyperkalemia, Hyperlipidemia, Ill-defined condition, Neuropathy, PAD (peripheral artery disease) ([address] Sciatica, Stroke ([address] and UTI (urinary tract infection). PSH:   has a past surgical history that includes hx other surgical (Bilateral); hx amputation (Right); hx other surgical; hx cervical fusion; hx vascular stent (Bilateral); hx vascular stent (Bilateral); and hx gi. FHX: family history includes Cancer in her mother; Diabetes in her mother; Hypertension in her mother. SHX:  reports that she has never smoked. She has never used smokeless tobacco. She reports previous alcohol use. She reports that she does not use drugs. ROS:  Unable to obtain intubated on ventilator    Hemodynamics:    CO:    CI:    CVP:    SVR:   PAP Systolic:    PAP Diastolic:    PVR:    BI73:        Ventilator Settings:      Mode Rate TV Press PEEP FiO2 PIP Min.  Vent   Assist control,Volume control    500 ml    5 cm H20 30 %  23 cm H2O  6 l/min        Vital Signs: Telemetry:    normal sinus rhythm Intake/Output:   Visit Vitals  BP (!) 111/56 (BP 1 Location: Left upper arm, BP Patient Position: At rest)   Pulse 65   Temp 97.9 °F (36.6 °C)   Resp 12   Ht 5' 7.01\" (1.702 m)   Wt 86.4 kg (190 lb 7.6 oz)   SpO2 100%   Breastfeeding No   BMI 29.83 kg/m²       Temp (24hrs), Av.1 °F (37.3 °C), Min:97.9 °F (36.6 °C), Max:100.7 °F (38.2 °C)        O2 Device: Ventilator O2 Flow Rate (L/min): 1 l/min       Wt Readings from Last 4 Encounters:   22 86.4 kg (190 lb 7.6 oz) 04/10/22 86.6 kg (191 lb)   02/27/22 82.2 kg (181 lb 3.5 oz)   07/21/20 84.4 kg (186 lb)          Intake/Output Summary (Last 24 hours) at 5/22/2022 0902  Last data filed at 5/22/2022 0800  Gross per 24 hour   Intake 3072.61 ml   Output 1200 ml   Net 1872.61 ml       Last shift:      05/22 0701 - 05/22 1900  In: 40   Out: -   Last 3 shifts: 05/20 1901 - 05/22 0700  In: 5050.1 [I.V.:1870.1]  Out: 1700 [Urine:1700]       Physical Exam:     General: intubated on vent unresponsive on propofol  HEENT: NCAT,  Eyes: anicteric; conjunctiva clear doll's eye reflex present  Neck: no nodes, no cuff leak, trach midline; no accessory MM use. Neck veins obscured by obesity but seems slightly engorged  Chest: no deformity,   Cardiac: R regular; no murmur;   Lungs: distant breath sounds; no wheezes rales in the bases  Abd: soft, NT, hypoactive BS  Ext: Ace edema; no joint swelling;  No clubbing  :clear urine  Neuro: Positive on propofol doll's eye reflex present flaccid extremities  Psych-  unable to assess  Skin: warm, dry, no cyanosis;   Pulses: Brachial and radial pulses intact  Capillary: Normal capillary refill      DATA:    MAR reviewed and pertinent medications noted or modified as needed  MEDS:   Current Facility-Administered Medications   Medication    sacubitriL-valsartan (ENTRESTO) 24-26 mg tablet 1 Tablet    metoprolol succinate (TOPROL-XL) XL tablet 25 mg    ampicillin-sulbactam (UNASYN) 3 g in 0.9% sodium chloride (MBP/ADV) 100 mL MBP    dextrose 10% infusion 0-250 mL    insulin glargine (LANTUS) injection 10 Units    NOREPINephrine (LEVOPHED) 32,000 mcg in dextrose 5% 250 mL (128 mcg/mL) infusion    propofol (DIPRIVAN) 10 mg/mL infusion    0.9% sodium chloride infusion 250 mL    sodium hypochlorite (HALF STRENGTH DAKIN'S) 0.25% irrigation (bottle)    glucose chewable tablet 16 g    glucagon (GLUCAGEN) injection 1 mg    insulin lispro (HUMALOG) injection    Vancomycin Pharmacy Dosing    sodium chloride (NS) flush 5-40 mL    acetaminophen (TYLENOL) tablet 650 mg    Or    acetaminophen (TYLENOL) suppository 650 mg    polyethylene glycol (MIRALAX) packet 17 g    ondansetron (ZOFRAN ODT) tablet 4 mg    Or    ondansetron (ZOFRAN) injection 4 mg    [Held by provider] enoxaparin (LOVENOX) injection 40 mg    famotidine (PEPCID) tablet 20 mg    acidophilus-pectin, citrus tablet 2 Tablet    albuterol-ipratropium (DUO-NEB) 2.5 MG-0.5 MG/3 ML    artificial saliva (MOUTH KOTE) 1 Spray    aspirin delayed-release tablet 81 mg    brimonidine (ALPHAGAN) 0.2 % ophthalmic solution 1 Drop    cholestyramine-aspartame (QUESTRAN LIGHT) packet 4 g    [Held by provider] fenofibrate nanocrystallized (TRICOR) tablet 48 mg    ferrous sulfate tablet 325 mg    gabapentin (NEURONTIN) capsule 300 mg    hydrALAZINE (APRESOLINE) tablet 50 mg    insulin glargine (LANTUS) injection 50 Units    latanoprost (XALATAN) 0.005 % ophthalmic solution 1 Drop    traMADoL (ULTRAM) tablet 25 mg        Labs:    Recent Labs     05/22/22  0400 05/21/22  0500 05/20/22  0540 05/19/22  1720 05/19/22  1257   WBC 17.3* 12.9* 17.2*  --   --    HGB 8.7* 8.5* 6.8*  --   --     226 334  --   --    INR 1.7* 1.6* 1.8*  --    < >   APTT  --   --  31.7 32.2  --     < > = values in this interval not displayed.      Recent Labs     05/22/22  0400 05/21/22  0515 05/21/22  0500 05/20/22  0540   *  --  148* 144   K 3.7  --  3.4* 3.7   *  --  117* 118*   CO2 22 --  22 19*   *  --  232* 240*   BUN 43*  --  48* 55*   CREA 1.09*  --  1.16* 1.45*   CA 8.0*  --  8.1* 8.6   MG  --  2.5*  --  2.4   PHOS  --   --   --  3.2   ALB 1.6*  --  1.7* 1.5*   *  --  1,313* 1,361*     Recent Labs     05/22/22  0332 05/21/22  0430 05/20/22  0428   PH 7.41 7.41 7.45   PCO2 28* 32* 29*   PO2 85 99 83   HCO3 19* 21* 21*   FIO2 30.0 30.0 30.0   5/22 AC 12  PEEP 5 FiO2 30%  5/18 echo ejection fraction 20% mild mitral regurg left atrium 3.5 cm RVSP 35  , 1313, 1361    Lab Results   Component Value Date/Time    Culture result: Rare Normal respiratory yasmine 2022 02:48 PM    Culture result: Heavy Acinetobacter baumannii complex 05/15/2022 07:15 PM    Culture result: Heavy Diphtheroids 05/15/2022 07:15 PM     Lab Results   Component Value Date/Time    TSH 2.880 2016 10:13 AM        Imagin/22 chest x-ray with ET tube in place hazy accentuated basilar markings with small amount of fluid in the minor fissure  Results from Hospital Encounter encounter on 22    XR CHEST PORT    Narrative  Chest.    Impression  No significant change allowing for difference in technique and  positioning. Single portable AP view compared to yesterday. Rotation to the right. Monitoring  equipment overlies the body. Suboptimal inspiratory film compromises  visualization of the lower lung fields. Mild cardiomegaly. The tip of the ET tube is approximately 3 cm above the jennifer. Mild basal interstitial edema. No new consolidation. No pleural effusion or  pneumothorax. Results from [place] encounter on 22    CT HEAD WO CONT    Narrative  PROCEDURE: CT HEAD WO CONT    HISTORY:Altered mental status    COMPARISON:Head CT 3 March 2022    Department policy stipulates all CT scans at this facility are performed using  dose reduction optimization techniques as appropriate to the performed exam,  including the following: Automated exposure control, adjustments of the mA  and/or KVP according to the patient size, and the use of iterative  reconstruction technique. TECHNIQUE: Without IV contrast    Bones/extracranial soft tissues:  Maxillary sinuses and right frontal sinus  remains nearly completely opacified. Extra-axial spaces:  No hemorrhage, mass or focal fluid collection. Ventricles/sulci:  There is mild dilatation of the ventricles. Sulci are  prominent. Brain parenchyma:   An area of encephalomalacia in the left frontal lobe is  showing chronic evolution without evidence of hemorrhagic conversion. No other  focal lesions identified. No mass effect. There is good gray-white differentiation. There are  inhomogeneous areas of mildly decreased density in periventricular white matter. Impression  Chronic changes which appear stable. No acute or active intracranial process. Chronic paranasal sinus disease      5/20 intubated on ventilator we will continue current vent settings patient is supposed to go for surgery because of transaminitis elevated liver enzyme we will wait as per surgeon for sacral wound debridement and diverting loop colostomy off Levophed, improvement in AST and ALT  5/21 continue with the current vent settings ABG acceptable liver enzyme improving awaiting for the surgery  5/22 maintain ventilator as is in anticipation of surgery.   Renal function improved mild congestion on chest x-ray we will give 1 dose Lasix and potassium  Time of care 30 minutes  Gonzalo Sky MD

## 2022-05-22 NOTE — PROGRESS NOTES
Hematology Oncology Progress Note     Interval History :    Patient remains intubated and sedated not much responsive. Hemoglobin is better today at 8.7 g/dL. Received 1 unit of PRBC. She also received 2 units of plasma on Friday. No bleeding is reported today . Central line site shows no bleeding and Bell catheter is clear. She has leukocytosis, slowly improving . Liver function tests are improving.   Subjective:         Current Facility-Administered Medications   Medication Dose Route Frequency    [START ON 5/23/2022] Vancomycin - Please draw random level 5/23 @ 0400   Other ONCE    hydrALAZINE (APRESOLINE) tablet 25 mg  25 mg Oral TID    potassium chloride (K-DUR, KLOR-CON M20) SR tablet 40 mEq  40 mEq Oral ONCE    sacubitriL-valsartan (ENTRESTO) 24-26 mg tablet 1 Tablet  1 Tablet Oral Q12H    metoprolol succinate (TOPROL-XL) XL tablet 25 mg  25 mg Oral DAILY    ampicillin-sulbactam (UNASYN) 3 g in 0.9% sodium chloride (MBP/ADV) 100 mL MBP  3 g IntraVENous Q8H    dextrose 10% infusion 0-250 mL  0-250 mL IntraVENous PRN    insulin glargine (LANTUS) injection 10 Units  10 Units SubCUTAneous QHS    NOREPINephrine (LEVOPHED) 32,000 mcg in dextrose 5% 250 mL (128 mcg/mL) infusion  2 mcg/min IntraVENous TITRATE    propofol (DIPRIVAN) 10 mg/mL infusion  0-50 mcg/kg/min IntraVENous TITRATE    0.9% sodium chloride infusion 250 mL  250 mL IntraVENous PRN    sodium hypochlorite (HALF STRENGTH DAKIN'S) 0.25% irrigation (bottle)   Topical BID    glucose chewable tablet 16 g  4 Tablet Oral PRN    glucagon (GLUCAGEN) injection 1 mg  1 mg IntraMUSCular PRN    insulin lispro (HUMALOG) injection   SubCUTAneous Q6H    Vancomycin Pharmacy Dosing   Other Rx Dosing/Monitoring    sodium chloride (NS) flush 5-40 mL  5-40 mL IntraVENous Q8H    acetaminophen (TYLENOL) tablet 650 mg  650 mg Oral Q6H PRN    Or    acetaminophen (TYLENOL) suppository 650 mg  650 mg Rectal Q6H PRN    polyethylene glycol (MIRALAX) packet 17 g  17 g Oral DAILY PRN    ondansetron (ZOFRAN ODT) tablet 4 mg  4 mg Oral Q8H PRN    Or    ondansetron (ZOFRAN) injection 4 mg  4 mg IntraVENous Q6H PRN    [Held by provider] enoxaparin (LOVENOX) injection 40 mg  40 mg SubCUTAneous DAILY    famotidine (PEPCID) tablet 20 mg  20 mg Oral BID    acidophilus-pectin, citrus tablet 2 Tablet  2 Tablet Oral DAILY    albuterol-ipratropium (DUO-NEB) 2.5 MG-0.5 MG/3 ML  3 mL Nebulization Q6H PRN    artificial saliva (MOUTH KOTE) 1 Spray  1 Spray Oral TID    aspirin delayed-release tablet 81 mg  81 mg Oral DAILY    brimonidine (ALPHAGAN) 0.2 % ophthalmic solution 1 Drop  1 Drop Both Eyes TID    cholestyramine-aspartame (QUESTRAN LIGHT) packet 4 g  4 g Oral BID WITH MEALS    [Held by provider] fenofibrate nanocrystallized (TRICOR) tablet 48 mg  48 mg Oral DAILY    ferrous sulfate tablet 325 mg  325 mg Oral BID    gabapentin (NEURONTIN) capsule 300 mg  300 mg Oral BID    insulin glargine (LANTUS) injection 50 Units  50 Units SubCUTAneous DAILY    latanoprost (XALATAN) 0.005 % ophthalmic solution 1 Drop  1 Drop Right Eye QPM    traMADoL (ULTRAM) tablet 25 mg  25 mg Oral Q12H PRN        Review of Systems:  Review of systems not obtainable patient is intubated on vent support. Objective:     Patient Vitals for the past 8 hrs:   BP Temp Pulse Resp SpO2 Weight   22 1600 (!) 104/56 -- 62 12 100 % --   22 1500 (!) 110/50 97.6 °F (36.4 °C) 63 13 100 % --   22 1400 (!) 113/53 -- 65 12 100 % --   22 1300 (!) 115/52 -- 65 13 100 % --   22 1209 -- -- -- -- -- 85.9 kg (189 lb 6 oz)   22 1200 (!) 124/53 -- 66 13 99 % --   22 1100 (!) 125/57 97.5 °F (36.4 °C) 68 13 100 % --   22 1000 (!) 130/55 -- 68 19 100 % --       Temp (24hrs), Av.8 °F (37.1 °C), Min:97.5 °F (36.4 °C), Max:100.7 °F (38.2 °C)      Physical Exam:    Constitutional  -American female, elderly currently intubated and sedated. Appears close to chronological age. Well nourished. Well developed. Head Normocephalic; no scars   Eyes Conjunctivae and sclerae are clear and without icterus. Pupils are reactive and equal.   ENMT  ET tube is present   Neck Supple without masses or thyromegaly. No jugular venous distension. Hematologic/Lymphatic No petechiae or purpura. No tender or palpable lymph nodes in the cervical, supraclavicular, axillary or inguinal area. Respiratory Lungs are clear to auscultation without rhonchi or wheezing. Cardiovascular Regular rate and rhythm of heart without murmurs, gallops or rubs. Chest / Line Site Chest is symmetric with no chest wall deformities. Abdomen  abdomen is soft nontender PEG tube is present no bleeding at the site . Musculoskeletal No tenderness or swelling, normal range of motion without obvious weakness. Extremities  she has peripheral vascular disease changes with amputated toes and dressing on her feet and ankles   Skin No rashes, scars, or lesions suggestive of malignancy. No petechiae, purpura, or ecchymoses. No excoriations.     Neurologic  not responsive at this time due to sedation   Psychiatric      Lab/Data Review:  Recent Labs     05/22/22  0400 05/21/22  0500 05/20/22  0540   WBC 17.3* 12.9* 17.2*   HGB 8.7* 8.5* 6.8*   HCT 28.4* 26.9* 22.0*    226 334     Recent Labs     05/22/22  0400 05/21/22  0515 05/21/22  0500 05/20/22  0540   *  --  148* 144   K 3.7  --  3.4* 3.7   *  --  117* 118*   CO2 22 --  22 19*   *  --  232* 240*   BUN 43*  --  48* 55*   CREA 1.09*  --  1.16* 1.45*   CA 8.0*  --  8.1* 8.6   MG  --  2.5*  --  2.4   PHOS  --   --   --  3.2   ALB 1.6*  --  1.7* 1.5*   TBILI 0.5  --  0.5 0.4   *  --  1,313* 1,361*   INR 1.7*  --  1.6* 1.8*     Recent Labs     05/22/22  0332 05/21/22  0430 05/20/22  0428   PH 7.41 7.41 7.45   PCO2 28* 32* 29*   PO2 85 99 83   HCO3 19* 21* 21*   FIO2 30.0 30.0 30.0         Radiology:   US ABD LTD    Result Date: 5/19/2022  1. Question pericholecystic fluid. No identified gallstones or sludge. 2. Right pleural effusion. 3. Increased right renal echotexture for medical renal disease. XR CHEST PORT    Result Date: 5/22/2022  No significant change allowing for difference in technique and positioning. Single portable AP view compared to yesterday. Suboptimal inspiratory film compromises visualization of the lower lung fields. Monitoring equipment overlies the body. The tip of the tube is approximately 3 cm above the jennifer. Mild apparent cardiomegaly. Left heart border and left diaphragm obscured. Hazy airspace opacification both lung bases may be a combination of atelectasis, pneumonia, or edema. No large effusion. Negative for pneumothorax. XR CHEST PORT    Result Date: 5/21/2022  No significant change allowing for difference in technique and positioning. Single portable AP view compared to yesterday. Rotation to the right. Monitoring equipment overlies the body. Suboptimal inspiratory film compromises visualization of the lower lung fields. Mild cardiomegaly. The tip of the ET tube is approximately 3 cm above the jennifer. Mild basal interstitial edema. No new consolidation. No pleural effusion or pneumothorax. XR CHEST PORT    Result Date: 5/20/2022  1. The endotracheal tube is in satisfactory position. 2.  There has been a partial interval resolution in the pulmonary interstitial edema pattern. XR CHEST PORT    Result Date: 5/19/2022  Ill-defined haziness in the mid to lower lung zones suspect for mild atelectatic changes and/or interstitial fluid or pleural fluid. XR CHEST PORT    Result Date: 5/18/2022  Intubation. Findings suggesting hydrostatic edema. XR CHEST PORT    Result Date: 5/14/2022  No evidence of an acute cardiopulmonary process.            Assessment /Plan:     Active Problems:    Sacral ulcer ([address] (5/14/2022)          89-year-old female  admitted with severe decubitus ulcer related infection/sepsis. 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    2) patient is currently in shock secondary to sepsis/cardiogenic shock.  -She is intubated on pressor support. -Marked elevation in liver transaminases consistent with shock liver. Improving gradually  -Also has acute renal failure.  -Currently being treated for sepsis with antibiotics. Cardiology is also following the patient. Her EF is down to 20 to 25%     3) coagulopathy: Appears to be secondary to liver failure. -Getting vitamin K 10 mg for 3 days.  -No evidence of DIC. She has normal fibrinogen and PTT. -Received 2 units of plasma for concerns for bleeding. No bleeding is reported today. -INR is down to 1.6. Today it is 1.7. Expect the symptoms to improve as liver failure recovers.  -Her platelet count is normal now.    4) anemia: Acute on chronic anemia.   Likely secondary to CKD and some bleeding from ongoing wound problems.  -Post packed red blood cell transfusion hemoglobin improved to 8.7 g/dL  -Her iron studies are normal.  Q57 folic acid is pending    Transfuse for hemoglobin less than 7 g/dL         Faby Walker MD  5/22/2022

## 2022-05-22 NOTE — PROGRESS NOTES
CARDIOLOGY PROGRESS NOTE - NP     Patient seen and examined. This is a patient who is followed for new onset cardiomyopathy and elevated troponin following rapid decompensation prior to loop colostomy, sacral wound debridement, and amputation of left transmetatarsal.  Remains septic and continues to be intubated and sedated. Febrile this morning with worsening leukocytosis. Blood pressures soft following initiation of GDMT yesterday per nursing, however, documented Bps are stable.      Telemetry reviewed. Remains in NSR. No further NSVT in the last 24 hours.     Unable to obtain ROS as is intubated/sedated. Current medications reviewed.     Physical Examination  Vital signs are stable. Blood pressure 130/55, Pulse 68  No apparent distress. Heart is regular, rate and rhythm. Normal S1, S2, no murmurs are appreciated. Lungs are clear but diminished bilaterally. Abdomen is soft, nontender, normal bowel sounds. Extremities have mild edema.     Labs reviewed: K+ 3.7      Case discussed with Dr. Neto Aguilar and our impression and recommendations are as follows:  1. New onset cardiomyopathy:  Echo 5/18/22 showing EF of 20-25% down from 50-55% in 4/2022. Continue Tier 1 Entresto and will decrease hydralazine to 25mg TID to allow for titration of Entresto. Will further wean as able. Volume status is relatively stable. 2. Elevated troponin:  Peaked at 890 on the day of deterioration requiring intubation/sedation. No asa presently as required transfusion for Hgb 6.8 5/20 with blood per rectum with dislodgement of Flexi-Seal.  Continue beta blocker. Ischemic evaluation pending clinical course as remains critical presently. No statin given LFTs elevated likely due to shock. Watch trend start when able   3. NSVT: None today thus far. Continue beta blocker. Maintain lytes. Will replete K+ today. 4.   Sepsis:  Per primary/ID.

## 2022-05-22 NOTE — PROGRESS NOTES
Vancomycin Dosing Consult  Day #8 of vancomycin therapy  Consult ordered by Dr. Caryle Engels for this 78 y.o. female for indication of decubitus ulcer infection, sepsis. Antibiotic regimen: Vancomycin + Unasyn    Temp (24hrs), Av.1 °F (37.3 °C), Min:97.9 °F (36.6 °C), Max:100.7 °F (38.2 °C)    Recent Labs     22  0400 22  0500 22  0540   WBC 17.3* 12.9* 17.2*   CREA 1.09* 1.16* 1.45*   BUN 43* 48* 55*     Est CrCl: ~50 ml/min; UO: 0.6 mL/kg/hr  Concomitant nephrotoxic drugs: None    Cultures:    Wound: Moderate MDR Pseudomonas aeruginosa susceptible to Zerbaxa, aminoglycosides), moderate mixed skin yasmine, moderate mixed enteric yasmine including yeast, final   Blood: CoNS in the anaerobic bottle, final   Wound, ulcer:  Moderate Acinetobacter baumannii, moderate Enterococcus faecium, light >2 organisms (contraindicated), final  5/15 Wound, great toe: Heavy Acinetobacter baumannii complex (Zosyn resistant, susceptible to Unasyn), heavy Diphtheroids, final   Sputum: Rare normal respiratory yasmine, final    MRSA Swab: Not ordered, patient already received first dose of vancomycin    Target range: Trough 10-15 mcg/mL    Recent level history:  Date/Time Dose & Interval Measured Level (mcg/mL)    @ 1300 750 mg q12h 24.2    @ 1600 500 mg q12h 28.2    @ 1720 Held 24.9    @ 1740 Held 18.6    @ 0500 Held 17.6    @ 0400 Held 14.3        Assessment/Plan:   Febrile this morning, procal and CRP trending down, leukocytosis worsened today  RICARDO, SCr trending down, clearance remains slow  Level within therapeutic range, will give a dose of 500 mg and check a level tomorrow AM  Antimicrobial stop date TBD

## 2022-05-22 NOTE — PROGRESS NOTES
General Daily Progress Note          Patient Name:   Yamini Ayala       YOB: 1945       Age:  78 y.o. 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  Cipro    Admitted again yesterday concerning for worsening of sacral ulcer    During last admission patient was seen by infectious disease and also seen by the vascular surgeon patient had debridement of wound during the last admission    Patient seen by the infectious disease yesterday    Sacral wound infection recent cultures growing Pseudomonas resistant to Zosyn and meropenem    5/17    Patient alert awake not in distress  Patient was seen by the vascular surgeon    Vascular surgery planned for laparoscopic loop colostomy placement and sacral debridement then wound vacuum  Also try to close the wound on the left foot with TMA    5/18    Resting in the bed not in any distress  Blood sugars running high    Seen by infectious disease continue vancomycin and start on Unasyn    5/19    And went to the OR intubated because of elevated LFT and elevated INR  Surgery was canceled    On ventilator 40% O2  Propofol drip    5/20    Patient on ventilator with 30% O2  On propofol drip    Hemoglobin dropped to 6.8    Patient's daughter at the bedside    5/20    Patient on ventilator 30% O2  On propofol drip  Getting PEG tube feeding    5/21    Patient still on ventilator 30% O2  On propofol drip  Liver Function improving    Objective:     Visit Vitals  BP (!) 130/55   Pulse 68   Temp 97.9 °F (36.6 °C)   Resp 19   Ht 5' 7.01\" (1.702 m)   Wt 86.4 kg (190 lb 7.6 oz)   SpO2 100% Breastfeeding No   BMI 29.83 kg/m²        Recent Results (from the past 24 hour(s))   GLUCOSE, POC    Collection Time: 05/21/22 11:30 AM   Result Value Ref Range    Glucose (POC) 225 (H) 65 - 117 mg/dL    Performed by [address] [place] [address], POC    Collection Time: 05/21/22  5:20 PM   Result Value Ref Range    Glucose (POC) 258 (H) 65 - 117 mg/dL    Performed by Santa Hernandez    GLUCOSE, POC    Collection Time: 05/21/22 10:31 PM   Result Value Ref Range    Glucose (POC) 246 (H) 65 - 117 mg/dL    Performed by Alondra Moncada    BLOOD GAS, ARTERIAL    Collection Time: 05/22/22  3:32 AM   Result Value Ref Range    pH 7.41 7.35 - 7.45      PCO2 28 (L) 35 - 45 mmHg    PO2 85 75 - 100 mmHg    O2 SAT 98 >95 %    BICARBONATE 19 (L) 22 - 26 mmol/L    BASE DEFICIT 6.4 (H) 0 - 2 mmol/L    O2 METHOD VENT      FIO2 30.0 %    MODE Assist Control/Volume Control      Tidal volume 500      SET RATE 12      IPAP/PIP 0      EPAP/CPAP/PEEP 5.0      SITE Arterial Line      EVELIN'S TEST PASS     CBC WITH AUTOMATED DIFF    Collection Time: 05/22/22  4:00 AM   Result Value Ref Range    WBC 17.3 (H) 3.6 - 11.0 K/uL    RBC 3.12 (L) 3.80 - 5.20 M/uL    HGB 8.7 (L) 11.5 - 16.0 g/dL    HCT 28.4 (L) 35.0 - 47.0 %    MCV 91.0 80.0 - 99.0 FL    MCH 27.9 26.0 - 34.0 PG    MCHC 30.6 30.0 - 36.5 g/dL    RDW 17.5 (H) 11.5 - 14.5 %    PLATELET 067 541 - 519 K/uL    MPV 11.5 8.9 - 12.9 FL    NRBC 1.3 (H) 0.0  WBC    ABSOLUTE NRBC 0.23 (H) 0.00 - 0.01 K/uL    NEUTROPHILS 77 (H) 32 - 75 %    LYMPHOCYTES 12 12 - 49 %    MONOCYTES 6 5 - 13 %    EOSINOPHILS 2 0 - 7 %    BASOPHILS 1 0 - 1 %    IMMATURE GRANULOCYTES 2 (H) 0 - 0.5 %    ABS. NEUTROPHILS 13.5 (H) 1.8 - 8.0 K/UL    ABS. LYMPHOCYTES 2.0 0.8 - 3.5 K/UL    ABS. MONOCYTES 1.1 (H) 0.0 - 1.0 K/UL    ABS. EOSINOPHILS 0.3 0.0 - 0.4 K/UL    ABS. BASOPHILS 0.1 0.0 - 0.1 K/UL    ABS. IMM.  GRANS. 0.3 (H) 0.00 - 0.04 K/UL    DF AUTOMATED     METABOLIC PANEL, COMPREHENSIVE    Collection Time: 05/22/22  4:00 AM   Result Value Ref Range    Sodium 147 (H) 136 - 145 mmol/L    Potassium 3.7 3.5 - 5.1 mmol/L    Chloride 119 (H) 97 - 108 mmol/L    CO2 22 21 - 32 mmol/L    Anion gap 6 5 - 15 mmol/L    Glucose 219 (H) 65 - 100 mg/dL    BUN 43 (H) 6 - 20 mg/dL    Creatinine 1.09 (H) 0.55 - 1.02 mg/dL    BUN/Creatinine ratio 39 (H) 12 - 20      GFR est AA 59 (L) >60 ml/min/1.73m2    GFR est non-AA 49 (L) >60 ml/min/1.73m2    Calcium 8.0 (L) 8.5 - 10.1 mg/dL    Bilirubin, total 0.5 0.2 - 1.0 mg/dL    AST (SGOT) 158 (H) 15 - 37 U/L    ALT (SGPT) 833 (H) 12 - 78 U/L    Alk. phosphatase 106 45 - 117 U/L    Protein, total 5.8 (L) 6.4 - 8.2 g/dL    Albumin 1.6 (L) 3.5 - 5.0 g/dL    Globulin 4.2 (H) 2.0 - 4.0 g/dL    A-G Ratio 0.4 (L) 1.1 - 2.2     VANCOMYCIN, RANDOM    Collection Time: 05/22/22  4:00 AM   Result Value Ref Range    Vancomycin, random 14.3 ug/mL   PROTHROMBIN TIME + INR    Collection Time: 05/22/22  4:00 AM   Result Value Ref Range    Prothrombin time 19.8 (H) 11.9 - 14.6 sec    INR 1.7 (H) 0.9 - 1.1       [unfilled]      Review of Systems    Not a good historian e. Physical Exam:      Constitutional: On ventilator  HENT:   Head: Normocephalic and atraumatic. Eyes: Pupils are equal, round, and reactive to light. EOM are normal.   Cardiovascular: Normal rate, regular rhythm and normal heart sounds. Pulmonary/Chest: Breath sounds normal. No wheezes. No rales. Exhibits no tenderness. Abdominal: Soft. Bowel sounds are normal. There is no abdominal tenderness. There is no rebound and no guarding. Musculoskeletal: Normal range of motion. Neurological: On ventilator   skin sacral decubitus ulcer and left foot wound right big toe amputation    XR CHEST PORT   Final Result   No significant change allowing for difference in technique and   positioning. Single portable AP view compared to yesterday. Suboptimal inspiratory film   compromises visualization of the lower lung fields.  Monitoring equipment   overlies the body. The tip of the tube is approximately 3 cm above the jennifer. Mild apparent cardiomegaly. Left heart border and left diaphragm obscured. Hazy airspace opacification both lung bases may be a combination of atelectasis,   pneumonia, or edema. No large effusion. Negative for pneumothorax. XR CHEST PORT   Final Result   No significant change allowing for difference in technique and   positioning. Single portable AP view compared to yesterday. Rotation to the right. Monitoring   equipment overlies the body. Suboptimal inspiratory film compromises   visualization of the lower lung fields. Mild cardiomegaly. The tip of the ET tube is approximately 3 cm above the jennifer. Mild basal interstitial edema. No new consolidation. No pleural effusion or   pneumothorax. XR CHEST PORT   Final Result   1. The endotracheal tube is in satisfactory position. 2.  There has been a partial interval resolution in the pulmonary interstitial   edema pattern. XR CHEST PORT   Final Result   Ill-defined haziness in the mid to lower lung zones suspect for mild   atelectatic changes and/or interstitial fluid or pleural fluid. US ABD LTD   Final Result   1. Question pericholecystic fluid. No identified gallstones or sludge. 2. Right pleural effusion. 3. Increased right renal echotexture for medical renal disease. XR CHEST PORT   Final Result   Intubation. Findings suggesting hydrostatic edema. XR CHEST PORT   Final Result   No evidence of an acute cardiopulmonary process.       XR CHEST PORT    (Results Pending)        Recent Results (from the past 24 hour(s))   GLUCOSE, POC    Collection Time: 05/21/22 11:30 AM   Result Value Ref Range    Glucose (POC) 225 (H) 65 - 117 mg/dL    Performed by [address] [place] [address]    Collection Time: 05/21/22  5:20 PM   Result Value Ref Range    Glucose (POC) 258 (H) 65 - 117 mg/dL    Performed by [facility] GLUCOSE, POC    Collection Time: 05/21/22 10:31 PM   Result Value Ref Range    Glucose (POC) 246 (H) 65 - 117 mg/dL    Performed by Junior Byrd    BLOOD GAS, ARTERIAL    Collection Time: 05/22/22  3:32 AM   Result Value Ref Range    pH 7.41 7.35 - 7.45      PCO2 28 (L) 35 - 45 mmHg    PO2 85 75 - 100 mmHg    O2 SAT 98 >95 %    BICARBONATE 19 (L) 22 - 26 mmol/L    BASE DEFICIT 6.4 (H) 0 - 2 mmol/L    O2 METHOD VENT      FIO2 30.0 %    MODE Assist Control/Volume Control      Tidal volume 500      SET RATE 12      IPAP/PIP 0      EPAP/CPAP/PEEP 5.0      SITE Arterial Line      EVELIN'S TEST PASS     CBC WITH AUTOMATED DIFF    Collection Time: 05/22/22  4:00 AM   Result Value Ref Range    WBC 17.3 (H) 3.6 - 11.0 K/uL    RBC 3.12 (L) 3.80 - 5.20 M/uL    HGB 8.7 (L) 11.5 - 16.0 g/dL    HCT 28.4 (L) 35.0 - 47.0 %    MCV 91.0 80.0 - 99.0 FL    MCH 27.9 26.0 - 34.0 PG    MCHC 30.6 30.0 - 36.5 g/dL    RDW 17.5 (H) 11.5 - 14.5 %    PLATELET 188 721 - 004 K/uL    MPV 11.5 8.9 - 12.9 FL    NRBC 1.3 (H) 0.0  WBC    ABSOLUTE NRBC 0.23 (H) 0.00 - 0.01 K/uL    NEUTROPHILS 77 (H) 32 - 75 %    LYMPHOCYTES 12 12 - 49 %    MONOCYTES 6 5 - 13 %    EOSINOPHILS 2 0 - 7 %    BASOPHILS 1 0 - 1 %    IMMATURE GRANULOCYTES 2 (H) 0 - 0.5 %    ABS. NEUTROPHILS 13.5 (H) 1.8 - 8.0 K/UL    ABS. LYMPHOCYTES 2.0 0.8 - 3.5 K/UL    ABS. MONOCYTES 1.1 (H) 0.0 - 1.0 K/UL    ABS. EOSINOPHILS 0.3 0.0 - 0.4 K/UL    ABS. BASOPHILS 0.1 0.0 - 0.1 K/UL    ABS. IMM.  GRANS. 0.3 (H) 0.00 - 0.04 K/UL    DF AUTOMATED     METABOLIC PANEL, COMPREHENSIVE    Collection Time: 05/22/22  4:00 AM   Result Value Ref Range    Sodium 147 (H) 136 - 145 mmol/L    Potassium 3.7 3.5 - 5.1 mmol/L    Chloride 119 (H) 97 - 108 mmol/L    CO2 22 21 - 32 mmol/L    Anion gap 6 5 - 15 mmol/L    Glucose 219 (H) 65 - 100 mg/dL    BUN 43 (H) 6 - 20 mg/dL    Creatinine 1.09 (H) 0.55 - 1.02 mg/dL    BUN/Creatinine ratio 39 (H) 12 - 20      GFR est AA 59 (L) >60 ml/min/1.73m2    GFR est non-AA 49 (L) >60 ml/min/1.73m2    Calcium 8.0 (L) 8.5 - 10.1 mg/dL    Bilirubin, total 0.5 0.2 - 1.0 mg/dL    AST (SGOT) 158 (H) 15 - 37 U/L    ALT (SGPT) 833 (H) 12 - 78 U/L    Alk.  phosphatase 106 45 - 117 U/L    Protein, total 5.8 (L) 6.4 - 8.2 g/dL    Albumin 1.6 (L) 3.5 - 5.0 g/dL    Globulin 4.2 (H) 2.0 - 4.0 g/dL    A-G Ratio 0.4 (L) 1.1 - 2.2     VANCOMYCIN, RANDOM    Collection Time: 05/22/22  4:00 AM   Result Value Ref Range    Vancomycin, random 14.3 ug/mL   PROTHROMBIN TIME + INR    Collection Time: 05/22/22  4:00 AM   Result Value Ref Range    Prothrombin time 19.8 (H) 11.9 - 14.6 sec    INR 1.7 (H) 0.9 - 1.1         Results     Procedure Component Value Units Date/Time    CULTURE, RESPIRATORY/SPUTUM/BRONCH Ricardo Jenifer [[id number]] Collected: 05/19/22 1448    Order Status: Completed Specimen: Sputum Updated: 05/21/22 0813     Special Requests: No Special Requests        GRAM STAIN 1+ WBCs seen         no epithelial cells seen         No organisms seen        Culture result:       Rare Normal respiratory yasmine          CULTURE, WOUND Rexmelisa Domingo STAIN [[id number]]  (Susceptibility) Collected: 05/15/22 1915    Order Status: Completed Specimen: Wound from Great Toe Updated: 05/18/22 0835     Special Requests: No Special Requests        GRAM STAIN       3+ Gram Positive Rods (Coryneform)                  1+ apparent Gram Negative Rods           Culture result:       Heavy Acinetobacter baumannii complex            Heavy Diphtheroids       Susceptibility      Acinetobacter baumannii complex     GEMA     Ampicillin/sulbactam ($) Susceptible     Cefepime ($$) Susceptible     Ceftazidime ($) Susceptible     Ceftriaxone ($) Intermediate     Ciprofloxacin ($) Resistant     Gentamicin ($) Susceptible     Levofloxacin ($) Resistant     Meropenem ($$) Resistant     Piperacillin/Tazobac ($) Resistant     Tobramycin ($) Susceptible     Trimeth/Sulfa Susceptible                  Linear View                   CULTURE, Minerva Reap STAIN [[id number]]  (Susceptibility) Collected: 05/14/22 6849    Order Status: Completed Specimen: Wound from Ulcer Updated: 05/18/22 0841     Special Requests: No Special Requests        GRAM STAIN Few WBCs seen         2+ Gram Negative Rods               Occasional Gram Positive Cocci in pairs            Rare Gram Positive Rods        Culture result: Moderate Acinetobacter baumannii complex                  Moderate Enterococcus faecium                  Light >2 organisms - contaminated specimen. suggest recollection Anaerobic gram negative rods Beta Lactamase Positive          Susceptibility      Acinetobacter baumannii complex     GEMA     Ampicillin/sulbactam ($) Susceptible     Cefepime ($$) Susceptible     Ceftazidime ($) Susceptible     Ceftriaxone ($) Intermediate     Ciprofloxacin ($) Resistant     Gentamicin ($) Susceptible     Levofloxacin ($) Resistant     Meropenem ($$) Resistant     Piperacillin/Tazobac ($) Resistant     Tobramycin ($) Susceptible     Trimeth/Sulfa Susceptible                  Linear View               Susceptibility      Enterococcus faecium     GEMA     Ampicillin ($) Resistant     Daptomycin ($$$$$)  See below  [1]      Linezolid ($$$$$) Susceptible     Vancomycin ($) Susceptible                 [1]  Dose Dependent  (SENSITIVITIES PERFORMED BY E-TEST)          Linear View                   CULTURE, BLOOD, PAIRED [[id number]]  (Abnormal) Collected: 05/14/22 2000    Order Status: Completed Specimen: Blood Updated: 05/17/22 1212     Special Requests: No Special Requests        Culture result:       Staphylococcus species, coagulase negative GROWING IN THE ANAEROBIC BOTTLE. No Site Indicated            (NOTE) PRELIMINARY REPORT OF GRAM POSITIVE COCCI IN CLUSTERS GROWING IN THE ANAEROBIC BOTTLE, CALLED TO AND READ BACK BY CANDI KNOWLES 5/16/22 1118 SCP.            Labs:     Recent Labs     05/22/22  0400 05/21/22  0500   WBC 17.3* 12.9*   HGB 8.7* 8.5*   HCT 28.4* 26.9*  226     Recent Labs     05/22/22  0400 05/21/22  0515 05/21/22  0500 05/20/22  0540   *  --  148* 144   K 3.7  --  3.4* 3.7   *  --  117* 118*   CO2 22 --  22 19*   BUN 43*  --  48* 55*   CREA 1.09*  --  1.16* 1.45*   *  --  232* 240*   CA 8.0*  --  8.1* 8.6   MG  --  2.5*  --  2.4   PHOS  --   --   --  3.2     Recent Labs     05/22/22  0400 05/21/22  0500 05/20/22  0540   * 1,313* 1,361*    114 99   TBILI 0.5 0.5 0.4   TP 5.8* 6.1* 6.4   ALB 1.6* 1.7* 1.5*   GLOB 4.2* 4.4* 4.9*     Recent Labs     05/22/22  0400 05/21/22  0500 05/20/22  0540 05/19/22  1720 05/19/22  1257   INR 1.7* 1.6* 1.8*  --    < >   PTP 19.8* 19.3* 20.9*  --    < >   APTT  --   --  31.7 32.2  --     < > = values in this interval not displayed. Recent Labs     05/19/22  1720   TIBC 141*   PSAT 28   FERR 21,838*      No results found for: FOL, RBCF   Recent Labs     05/22/22  0332 05/21/22  0430   PH 7.41 7.41   PCO2 28* 32*   PO2 85 99     No results for input(s): CPK, CKNDX, TROIQ in the last 72 hours.     No lab exists for component: CPKMB  Lab Results   Component Value Date/Time    Cholesterol, total 124 03/08/2022 07:40 AM    HDL Cholesterol 30 03/08/2022 07:40 AM    LDL,Direct 79 06/28/2021 12:00 AM    LDL, calculated 44.8 03/08/2022 07:40 AM    Triglyceride 202 (H) 05/16/2022 06:20 AM    CHOL/HDL Ratio 4.1 03/08/2022 07:40 AM     Lab Results   Component Value Date/Time    Glucose (POC) 246 (H) 05/21/2022 10:31 PM    Glucose (POC) 258 (H) 05/21/2022 05:20 PM    Glucose (POC) 225 (H) 05/21/2022 11:30 AM    Glucose (POC) 236 (H) 05/20/2022 11:39 PM    Glucose (POC) 267 (H) 05/20/2022 05:11 PM     Lab Results   Component Value Date/Time    Color Yellow/Straw 05/14/2022 08:08 PM    Appearance Clear 05/14/2022 08:08 PM    Specific gravity 1.014 05/14/2022 08:08 PM    pH (UA) 6.0 05/14/2022 08:08 PM    Protein 30 (A) 05/14/2022 08:08 PM    Glucose 50 (A) 05/14/2022 08:08 PM    Ketone Negative 05/14/2022 08:08 PM    Bilirubin Negative 05/14/2022 08:08 PM    Urobilinogen 0.1 05/14/2022 08:08 PM    Nitrites Negative 05/14/2022 08:08 PM    Leukocyte Esterase Trace (A) 05/14/2022 08:08 PM    Bacteria Negative 05/14/2022 08:08 PM    Bacteria Rare (A) 05/14/2022 08:08 PM    WBC 0-4 05/14/2022 08:08 PM    WBC 4 05/14/2022 08:08 PM    RBC 0-5 05/14/2022 08:08 PM    RBC 1 05/14/2022 08:08 PM         Assessment:   Acute respiratory failure on ventilator 30% O2  Sacral decubitus ulcer with recent culture growing Pseudomonas rx with  Zosyn and meropenem  Sepsis with leukocytosis elevated procalcitonin and CRP  Left foot wound  Recent removal of left great toe and second toe  CVA with PEG tube placement  History of aspiration pneumonia  History of chronic kidney disease  Hypertension  Hyperlipidemia  Anemia of chronic disease  Hyperkalemia  Static post transfusion  Uncontrolled diabetes  Hepatic shock/improving  Coagulopathy  Elevated  troponin      Plan:     Unasyn 3 g every 8 hours  Aspirin 81 mg daily  Questran 4 g twice a day  Pepcid 20 twice daily  Ferrous sulfate 325 mg twice a day  Gabapentin 300 mg twice a day  Hydralazine 50 mg 3 times a day  Metoprolol 25 daily  Replace potassium  Entresto 1 tablet twice a day  Vancomycin with pharmacy protocol  Add Lantus 10 units in the evening    GI consult for hepatic shock  Hematology consult for coagulopathy      Monitor liver function/INR  Static post transfusion      Vascular surgery planned for laparoscopic loop colostomy placement and sacral debridement then wound vacuum  Also try to close the wound on the left foot with TMA    Overall prognosis very poor      Current Facility-Administered Medications:     vancomycin (VANCOCIN) 500 mg in 0.9% sodium chloride (MBP/ADV) 100 mL MBP, 500 mg, IntraVENous, ONCE, Livier Simpson DO    [START ON 5/23/2022] Vancomycin - Please draw random level 5/23 @ 0400, , Other, ONCE, Jarrett VILLASEÑOR,     hydrALAZINE (APRESOLINE) tablet 25 mg, 25 mg, Oral, TID, Ken, Terese E, NP    potassium chloride (K-DUR, KLOR-CON M20) SR tablet 40 mEq, 40 mEq, Oral, ONCE, Ken, Terese E, NP    sacubitriL-valsartan (ENTRESTO) 24-26 mg tablet 1 Tablet, 1 Tablet, Oral, Q12H, Ken, Terese E, NP, 1 Tablet at 05/22/22 0817    metoprolol succinate (TOPROL-XL) XL tablet 25 mg, 25 mg, Oral, DAILY, Ken, Terese E, NP, 25 mg at 05/22/22 0817    ampicillin-sulbactam (UNASYN) 3 g in 0.9% sodium chloride (MBP/ADV) 100 mL MBP, 3 g, IntraVENous, Q8H, Shaq Velasco MD, Last Rate: 200 mL/hr at 05/22/22 0549, 3 g at 05/22/22 0549    dextrose 10% infusion 0-250 mL, 0-250 mL, IntraVENous, PRN, Israel Soto MD, 125 mL at 05/19/22 0537    insulin glargine (LANTUS) injection 10 Units, 10 Units, SubCUTAneous, QHS, Israel Soto MD, 10 Units at 05/21/22 2301    NOREPINephrine (LEVOPHED) 32,000 mcg in dextrose 5% 250 mL (128 mcg/mL) infusion, 2 mcg/min, IntraVENous, TITRATE, Kailyn Parikh MD    propofol (DIPRIVAN) 10 mg/mL infusion, 0-50 mcg/kg/min, IntraVENous, TITRATE, Kailyn Parikh MD, Last Rate: 11.9 mL/hr at 05/22/22 1007, 25 mcg/kg/min at 05/22/22 1007    0.9% sodium chloride infusion 250 mL, 250 mL, IntraVENous, PRN, Israel Soto MD, Last Rate: 15 mL/hr at 05/22/22 0820, Rate Verify at 05/22/22 0820    sodium hypochlorite (HALF STRENGTH DAKIN'S) 0.25% irrigation (bottle), , Topical, BID, Israel Soto MD, Given at 05/22/22 1537    glucose chewable tablet 16 g, 4 Tablet, Oral, PRN, Israel Soto MD    glucagon (GLUCAGEN) injection 1 mg, 1 mg, IntraMUSCular, PRN, Jose Soto MD    insulin lispro (HUMALOG) injection, , SubCUTAneous, Q6H, Israel Soto MD, 2 Units at 05/22/22 0549    Vancomycin Pharmacy Dosing, , Other, Rx Dosing/Monitoring, Lydia Watts W, DO    sodium chloride (NS) flush 5-40 mL, 5-40 mL, IntraVENous, Q8H, rOestes Norman MD, 10 mL at 05/22/22 0550    acetaminophen (TYLENOL) tablet 650 mg, 650 mg, Oral, Q6H PRN, 650 mg at 05/22/22 0556 **OR** acetaminophen (TYLENOL) suppository 650 mg, 650 mg, Rectal, Q6H PRN, Dominique JACKSON MD, 650 mg at 05/16/22 2223    polyethylene glycol (MIRALAX) packet 17 g, 17 g, Oral, DAILY PRN, Orestes Klein MD    ondansetron (ZOFRAN ODT) tablet 4 mg, 4 mg, Oral, Q8H PRN **OR** ondansetron (ZOFRAN) injection 4 mg, 4 mg, IntraVENous, Q6H PRN, Dominique JACKSON MD    [Held by provider] enoxaparin (LOVENOX) injection 40 mg, 40 mg, SubCUTAneous, DAILY, Orestes Norman MD, 40 mg at 05/17/22 1006    famotidine (PEPCID) tablet 20 mg, 20 mg, Oral, BID, Orestes Norman MD, 20 mg at 05/22/22 0817    acidophilus-pectin, citrus tablet 2 Tablet, 2 Tablet, Oral, DAILY, Dominique JACKSON MD, 2 Tablet at 05/22/22 0817    albuterol-ipratropium (DUO-NEB) 2.5 MG-0.5 MG/3 ML, 3 mL, Nebulization, Q6H PRN, Dominique JACKSON MD    artificial saliva (MOUTH KOTE) 1 Spray, 1 Spray, Oral, TID, Dominique JACKSON MD, 1 [place] at 05/22/22 0818    aspirin delayed-release tablet 81 mg, 81 mg, Oral, DAILY, Orestes Norman MD, 81 mg at 05/22/22 0817    brimonidine (ALPHAGAN) 0.2 % ophthalmic solution 1 Drop, 1 Drop, Both Eyes, TID, Orestes Norman MD, 1 Drop at 05/22/22 0818    cholestyramine-aspartame (QUESTRAN LIGHT) packet 4 g, 4 g, Oral, BID WITH MEALS, Orestes Norman MD, 4 g at 05/22/22 0817    [Held by provider] fenofibrate nanocrystallized (TRICOR) tablet 48 mg, 48 mg, Oral, DAILY, Orestes Norman MD, 48 mg at 05/19/22 8130    ferrous sulfate tablet 325 mg, 325 mg, Oral, BID, Orestes Norman MD, 325 mg at 05/22/22 0817    gabapentin (NEURONTIN) capsule 300 mg, 300 mg, Oral, BID, Orestes Norman MD, 300 mg at 05/22/22 0817    insulin glargine (LANTUS) injection 50 Units, 50 Units, SubCUTAneous, DAILY, Orestes Norman MD, 50 Units at 05/22/22 0817    latanoprost (XALATAN) 0.005 % ophthalmic solution 1 Drop, 1 Drop, Right Eye, QPM, Orestes Norman, MD, 1 Drop at 05/21/22 1828    traMADoL (ULTRAM) tablet 25 mg, 25 mg, Oral, Q12H PRN, Rachel MD Florentino, 25 mg at 05/18/22 2929

## 2022-05-23 NOTE — PROGRESS NOTES
Renal Daily Progress Note    Admit Date: 5/14/2022      Subjective:   Patient seen in the ICU. She is to have a laparoscopic colostomy done today and amputation of toes on the left foot. The patient is intubated. No pressors. She is on IV normal saline. Tube feeds and free water are currently being held for surgery.       Current Facility-Administered Medications   Medication Dose Route Frequency    insulin lispro (HUMALOG) injection   SubCUTAneous Q6H    glucose chewable tablet 16 g  4 Tablet Oral PRN    glucagon (GLUCAGEN) injection 1 mg  1 mg IntraMUSCular PRN    dextrose 10% infusion 0-250 mL  0-250 mL IntraVENous PRN    [START ON 5/25/2022] Vancomycin random level to be drawn on 5/25/22 at 0400 am.   Other ONCE    hydrALAZINE (APRESOLINE) tablet 25 mg  25 mg Oral TID    sacubitriL-valsartan (ENTRESTO) 24-26 mg tablet 1 Tablet  1 Tablet Oral Q12H    metoprolol succinate (TOPROL-XL) XL tablet 25 mg  25 mg Oral DAILY    ampicillin-sulbactam (UNASYN) 3 g in 0.9% sodium chloride (MBP/ADV) 100 mL MBP  3 g IntraVENous Q8H    dextrose 10% infusion 0-250 mL  0-250 mL IntraVENous PRN    insulin glargine (LANTUS) injection 10 Units  10 Units SubCUTAneous QHS    NOREPINephrine (LEVOPHED) 32,000 mcg in dextrose 5% 250 mL (128 mcg/mL) infusion  2 mcg/min IntraVENous TITRATE    propofol (DIPRIVAN) 10 mg/mL infusion  0-50 mcg/kg/min IntraVENous TITRATE    0.9% sodium chloride infusion 250 mL  250 mL IntraVENous PRN    sodium hypochlorite (HALF STRENGTH DAKIN'S) 0.25% irrigation (bottle)   Topical BID    Vancomycin Pharmacy Dosing   Other Rx Dosing/Monitoring    sodium chloride (NS) flush 5-40 mL  5-40 mL IntraVENous Q8H    acetaminophen (TYLENOL) tablet 650 mg  650 mg Oral Q6H PRN    Or    acetaminophen (TYLENOL) suppository 650 mg  650 mg Rectal Q6H PRN    polyethylene glycol (MIRALAX) packet 17 g  17 g Oral DAILY PRN    ondansetron (ZOFRAN ODT) tablet 4 mg  4 mg Oral Q8H PRN    Or    ondansetron [facility]) injection 4 mg  4 mg IntraVENous Q6H PRN    [Held by provider] enoxaparin (LOVENOX) injection 40 mg  40 mg SubCUTAneous DAILY    famotidine (PEPCID) tablet 20 mg  20 mg Oral BID    acidophilus-pectin, citrus tablet 2 Tablet  2 Tablet Oral DAILY    albuterol-ipratropium (DUO-NEB) 2.5 MG-0.5 MG/3 ML  3 mL Nebulization Q6H PRN    artificial saliva (MOUTH KOTE) 1 Spray  1 Spray Oral TID    aspirin delayed-release tablet 81 mg  81 mg Oral DAILY    brimonidine (ALPHAGAN) 0.2 % ophthalmic solution 1 Drop  1 Drop Both Eyes TID    cholestyramine-aspartame (QUESTRAN LIGHT) packet 4 g  4 g Oral BID WITH MEALS    [Held by provider] fenofibrate nanocrystallized (TRICOR) tablet 48 mg  48 mg Oral DAILY    ferrous sulfate tablet 325 mg  325 mg Oral BID    gabapentin (NEURONTIN) capsule 300 mg  300 mg Oral BID    insulin glargine (LANTUS) injection 50 Units  50 Units SubCUTAneous DAILY    latanoprost (XALATAN) 0.005 % ophthalmic solution 1 Drop  1 Drop Right Eye QPM    traMADoL (ULTRAM) tablet 25 mg  25 mg Oral Q12H PRN        Review of Systems    ROS   Not obtainable as the patient is intubated    Objective:     Patient Vitals for the past 8 hrs:   BP Temp Pulse Resp SpO2   05/23/22 1110 -- -- 64 12 100 %   05/23/22 1000 (!) 104/50 -- 61 12 100 %   05/23/22 0913 -- -- 62 12 100 %   05/23/22 0900 (!) 102/50 -- 60 12 100 %   05/23/22 0805 (!) 110/55 -- 63 -- --   05/23/22 0800 (!) 108/57 -- 63 12 100 %   05/23/22 0700 (!) 110/55 97.9 °F (36.6 °C) 61 12 100 %   05/23/22 0600 (!) 113/57 -- 65 13 100 %   05/23/22 0500 (!) 117/54 -- 64 13 100 %   05/23/22 0418 -- -- 64 13 100 %   05/23/22 0400 (!) 116/56 98.1 °F (36.7 °C) 63 14 100 %     No intake/output data recorded. 05/21 1901 - 05/23 0700  In: 3996.4 [I.V.:1276.4]  Out: 1300 [Urine:1300]    Physical Exam:   Physical Exam  Constitutional:       Comments: Intubated and sedated with propofol   Cardiovascular:      Rate and Rhythm: Regular rhythm.       Comments: Heart rate 59  Pulmonary:      Breath sounds: Normal breath sounds. Abdominal:      Palpations: Abdomen is soft. Edema in the left lower extremity. Left foot in dressing        XR CHEST PORT   Final Result   Bibasilar opacities, possibly increased on the right. XR CHEST PORT   Final Result   No significant change allowing for difference in technique and   positioning. Single portable AP view compared to yesterday. Suboptimal inspiratory film   compromises visualization of the lower lung fields. Monitoring equipment   overlies the body. The tip of the tube is approximately 3 cm above the jennifer. Mild apparent cardiomegaly. Left heart border and left diaphragm obscured. Hazy airspace opacification both lung bases may be a combination of atelectasis,   pneumonia, or edema. No large effusion. Negative for pneumothorax. XR CHEST PORT   Final Result   No significant change allowing for difference in technique and   positioning. Single portable AP view compared to yesterday. Rotation to the right. Monitoring   equipment overlies the body. Suboptimal inspiratory film compromises   visualization of the lower lung fields. Mild cardiomegaly. The tip of the ET tube is approximately 3 cm above the jennifer. Mild basal interstitial edema. No new consolidation. No pleural effusion or   pneumothorax. XR CHEST PORT   Final Result   1. The endotracheal tube is in satisfactory position. 2.  There has been a partial interval resolution in the pulmonary interstitial   edema pattern. XR CHEST PORT   Final Result   Ill-defined haziness in the mid to lower lung zones suspect for mild   atelectatic changes and/or interstitial fluid or pleural fluid. US ABD LTD   Final Result   1. Question pericholecystic fluid. No identified gallstones or sludge. 2. Right pleural effusion. 3. Increased right renal echotexture for medical renal disease.       XR CHEST PORT   Final Result Intubation. Findings suggesting hydrostatic edema. XR CHEST PORT   Final Result   No evidence of an acute cardiopulmonary process. XR CHEST PORT    (Results Pending)        Data Review   Recent Labs     05/23/22  0420 05/22/22  0400 05/21/22  0500   WBC 17.2* 17.3* 12.9*   HGB 9.0* 8.7* 8.5*   HCT 28.6* 28.4* 26.9*    202 226     Recent Labs     05/23/22  0620 05/23/22  0420 05/22/22  0400 05/21/22  0515 05/21/22  0500   NA  --  147*  147* 147*  --  148*   K  --  4.1  4.1 3.7  --  3.4*   CL  --  120*  120* 119*  --  117*   CO2  --  18*  18* 22  --  22   GLU  --  238*  240* 219*  --  232*   BUN  --  49*  48* 43*  --  48*   CREA  --  1.09*  1.05* 1.09*  --  1.16*   CA  --  8.0*  7.9* 8.0*  --  8.1*   MG  --  2.4  --  2.5*  --    PHOS  --  3.4  3.4  --   --   --    ALB  --  1.5*  1.5* 1.6*  --  1.7*   ALT  --  525* 833*  --  1,313*   INR 1.5*  --  1.7*  --  1.6*     No components found for: [place]  Recent Labs     05/23/22  0415 05/22/22  0332 05/21/22  0430   PH 7.37 7.41 7.41   PCO2 29* 28* 32*   PO2 100 85 99   HCO3 18* 19* 21*   FIO2 30.0 30.0 30.0     Recent Labs     05/23/22  0620 05/22/22  0400 05/21/22  0500   INR 1.5* 1.7* 1.6*         Assessment:           Active Problems:    Sacral ulcer ([address] (5/14/2022)    Acute hypernatremia with free water deficit of 2.5 L. Chronic kidney disease stage III A renal function is stable    Anemia - acute on chronic . Decubitus ulcer    Elevated transaminases    Respiratory failure with bibasilar pulmonary opacities. Metabolic acidosis with AG of 9. ABG shows compensated metabolic acidosis. Saline could contribute to the metabolic acidosis. Plan:   Her free water deficit is 2.5 L. Will change IV normal saline to half-normal saline to increase her free water intake. Resuming free water with the PEG feeding will help in improving the serum sodium.

## 2022-05-23 NOTE — PROGRESS NOTES
IMPRESSION:   1. Acute hypoxic respiratory failure remains on the ventilator  2. Sacral decubiti ulcer with Acinetobacter and Enterococcus  3. Severe sepsis with septic shock resolved on no pressors  4. Left foot wound  5. Transaminitis continue to improve  6. Shock liver  7. Hypokalemia repleted  8. Symptomatic anemia stable after transfusion  9. Cardiomyopathy  10. Mild pulmonary edema      RECOMMENDATIONS/PLAN:   1. ICU monitoring  1. Ventilator for mechanical life support and prevent respiratory arrest with protective lung strategies  2. Maintain ventilator until surgical procedures completed  3. Liver enzymes improved eventually to be scheduled for diverting loop colostomy sacral wound debridement and left foot wound closure,   4. Severe sepsis with decubiti ulcer   5. Patient is on Unasyn  6. Chest x-ray shows mild congestive changes with fluid in the minor fissure  7. Anemia hemoglobin able after transfusion 5/20  8. INR 1.5 today possible surgery     [x] High complexity decision making was performed  [x] See my orders for details  HPI   79-year-old lady came in because of leg pain past medical history of hypertension diabetes mellitus peripheral vascular disease CVA she is bedbound she has leg wound and also has sacral decubiti ulcer and she was scheduled for laparoscopic colostomy and sacral wound debridement and amputation of the left tarsometatarsal because of wound infection but her condition got worse her liver enzyme was elevated INR was also elevated she was intubated transferred to ICU was getting vancomycin and Unasyn started on Levophed because of low blood pressure.     PMH:  has a past medical history of Anemia, Chronic kidney disease, Diabetes mellitus ([address] Hemiplegia affecting dominant side, post-stroke ([address] (05/04/2022), HTN (hypertension), Hyperkalemia, Hyperlipidemia, Ill-defined condition, Neuropathy, PAD (peripheral artery disease) ([address] Sciatica, Stroke ([address] and UTI (urinary tract infection). PSH:   has a past surgical history that includes hx other surgical (Bilateral); hx amputation (Right); hx other surgical; hx cervical fusion; hx vascular stent (Bilateral); hx vascular stent (Bilateral); and hx gi. FHX: family history includes Cancer in her mother; Diabetes in her mother; Hypertension in her mother. SHX:  reports that she has never smoked. She has never used smokeless tobacco. She reports previous alcohol use. She reports that she does not use drugs.     ALL: No Known Allergies     MEDS:   [x] Reviewed - As Below   [] Not reviewed    Current Facility-Administered Medications   Medication    hydrALAZINE (APRESOLINE) tablet 25 mg    sacubitriL-valsartan (ENTRESTO) 24-26 mg tablet 1 Tablet    metoprolol succinate (TOPROL-XL) XL tablet 25 mg    ampicillin-sulbactam (UNASYN) 3 g in 0.9% sodium chloride (MBP/ADV) 100 mL MBP    dextrose 10% infusion 0-250 mL    insulin glargine (LANTUS) injection 10 Units    NOREPINephrine (LEVOPHED) 32,000 mcg in dextrose 5% 250 mL (128 mcg/mL) infusion    propofol (DIPRIVAN) 10 mg/mL infusion    0.9% sodium chloride infusion 250 mL    sodium hypochlorite (HALF STRENGTH DAKIN'S) 0.25% irrigation (bottle)    glucose chewable tablet 16 g    glucagon (GLUCAGEN) injection 1 mg    insulin lispro (HUMALOG) injection    Vancomycin Pharmacy Dosing    sodium chloride (NS) flush 5-40 mL    acetaminophen (TYLENOL) tablet 650 mg    Or    acetaminophen (TYLENOL) suppository 650 mg    polyethylene glycol (MIRALAX) packet 17 g    ondansetron (ZOFRAN ODT) tablet 4 mg    Or    ondansetron (ZOFRAN) injection 4 mg    [Held by provider] enoxaparin (LOVENOX) injection 40 mg    famotidine (PEPCID) tablet 20 mg    acidophilus-pectin, citrus tablet 2 Tablet    albuterol-ipratropium (DUO-NEB) 2.5 MG-0.5 MG/3 ML    artificial saliva (MOUTH KOTE) 1 Spray    aspirin delayed-release tablet 81 mg    brimonidine (ALPHAGAN) 0.2 % ophthalmic solution 1 Drop    cholestyramine-aspartame (QUESTRAN LIGHT) packet 4 g    [Held by provider] fenofibrate nanocrystallized (TRICOR) tablet 48 mg    ferrous sulfate tablet 325 mg    gabapentin (NEURONTIN) capsule 300 mg    insulin glargine (LANTUS) injection 50 Units    latanoprost (XALATAN) 0.005 % ophthalmic solution 1 Drop    traMADoL (ULTRAM) tablet 25 mg      MAR reviewed and pertinent medications noted or modified as needed   Current Facility-Administered Medications   Medication    hydrALAZINE (APRESOLINE) tablet 25 mg    sacubitriL-valsartan (ENTRESTO) 24-26 mg tablet 1 Tablet    metoprolol succinate (TOPROL-XL) XL tablet 25 mg    ampicillin-sulbactam (UNASYN) 3 g in 0.9% sodium chloride (MBP/ADV) 100 mL MBP    dextrose 10% infusion 0-250 mL    insulin glargine (LANTUS) injection 10 Units    NOREPINephrine (LEVOPHED) 32,000 mcg in dextrose 5% 250 mL (128 mcg/mL) infusion    propofol (DIPRIVAN) 10 mg/mL infusion    0.9% sodium chloride infusion 250 mL    sodium hypochlorite (HALF STRENGTH DAKIN'S) 0.25% irrigation (bottle)    glucose chewable tablet 16 g    glucagon (GLUCAGEN) injection 1 mg    insulin lispro (HUMALOG) injection    Vancomycin Pharmacy Dosing    sodium chloride (NS) flush 5-40 mL    acetaminophen (TYLENOL) tablet 650 mg    Or    acetaminophen (TYLENOL) suppository 650 mg    polyethylene glycol (MIRALAX) packet 17 g    ondansetron (ZOFRAN ODT) tablet 4 mg    Or    ondansetron (ZOFRAN) injection 4 mg    [Held by provider] enoxaparin (LOVENOX) injection 40 mg    famotidine (PEPCID) tablet 20 mg    acidophilus-pectin, citrus tablet 2 Tablet    albuterol-ipratropium (DUO-NEB) 2.5 MG-0.5 MG/3 ML    artificial saliva (MOUTH KOTE) 1 Spray    aspirin delayed-release tablet 81 mg    brimonidine (ALPHAGAN) 0.2 % ophthalmic solution 1 Drop    cholestyramine-aspartame (QUESTRAN LIGHT) packet 4 g    [Held by provider] fenofibrate nanocrystallized (TRICOR) tablet 48 mg    ferrous sulfate tablet 325 mg    gabapentin (NEURONTIN) capsule 300 mg    insulin glargine (LANTUS) injection 50 Units    latanoprost (XALATAN) 0.005 % ophthalmic solution 1 Drop    traMADoL (ULTRAM) tablet 25 mg      PMH:  has a past medical history of Anemia, Chronic kidney disease, Diabetes mellitus ([facility] [address] Hemiplegia affecting dominant side, post-stroke ([facility] [address] [facility] (2022), HTN (hypertension), Hyperkalemia, Hyperlipidemia, Ill-defined condition, Neuropathy, PAD (peripheral artery disease) ([facility] [address] Sciatica, Stroke ([facility] [address] and UTI (urinary tract infection). PSH:   has a past surgical history that includes hx other surgical (Bilateral); hx amputation (Right); hx other surgical; hx cervical fusion; hx vascular stent (Bilateral); hx vascular stent (Bilateral); and hx gi. FHX: family history includes Cancer in her mother; Diabetes in her mother; Hypertension in her mother. SHX:  reports that she has never smoked. She has never used smokeless tobacco. She reports previous alcohol use. She reports that she does not use drugs. ROS:  Unable to obtain intubated on ventilator    Hemodynamics:    CO:    CI:    CVP:    SVR:   PAP Systolic:    PAP Diastolic:    PVR:    MF49:        Ventilator Settings:      Mode Rate TV Press PEEP FiO2 PIP Min.  Vent   Assist control,Volume control    500 ml    5 cm H20 30 %  11 cm H2O  6.06 l/min        Vital Signs: Telemetry:    normal sinus rhythm Intake/Output:   Visit Vitals  BP (!) 110/55   Pulse 63   Temp 97.9 °F (36.6 °C)   Resp 13   Ht 5' 7.01\" (1.702 m)   Wt 89.2 kg (196 lb 10.4 oz)   SpO2 100%   Breastfeeding No   BMI 30.79 kg/m²       Temp (24hrs), Av °F (36.7 °C), Min:97.5 °F (36.4 °C), Max:98.4 °F (36.9 °C)        O2 Device: Ventilator O2 Flow Rate (L/min): 1 l/min       Wt Readings from Last 4 Encounters:   22 89.2 kg (196 lb 10.4 oz)   04/10/22 86.6 kg (191 lb)   22 82.2 kg (181 lb 3.5 oz)   20 84.4 kg (186 lb)          Intake/Output Summary (Last 24 hours) at 5/23/2022 0827  Last data filed at 5/23/2022 0606  Gross per 24 hour   Intake 2304.89 ml   Output 950 ml   Net 1354.89 ml       Last shift:      No intake/output data recorded. Last 3 shifts: 05/21 1901 - 05/23 0700  In: 3996.4 [I.V.:1276.4]  Out: 1300 [Urine:1300]       Physical Exam:     General: intubated on vent unresponsive on propofol  HEENT: NCAT,  Eyes: anicteric; conjunctiva clear doll's eye reflex present  Neck: no nodes, no cuff leak, trach midline; no accessory MM use. Neck veins obscured by obesity but seems slightly engorged  Chest: no deformity,   Cardiac: R regular; no murmur;   Lungs: distant breath sounds; no wheezes rales in the bases  Abd: soft, NT, hypoactive BS  Ext: Ace edema; no joint swelling;  No clubbing  :clear urine  Neuro: Positive on propofol doll's eye reflex present flaccid extremities  Psych-  unable to assess  Skin: warm, dry, no cyanosis;   Pulses: Brachial and radial pulses intact  Capillary: Normal capillary refill      DATA:    MAR reviewed and pertinent medications noted or modified as needed  MEDS:   Current Facility-Administered Medications   Medication    hydrALAZINE (APRESOLINE) tablet 25 mg    sacubitriL-valsartan (ENTRESTO) 24-26 mg tablet 1 Tablet    metoprolol succinate (TOPROL-XL) XL tablet 25 mg    ampicillin-sulbactam (UNASYN) 3 g in 0.9% sodium chloride (MBP/ADV) 100 mL MBP    dextrose 10% infusion 0-250 mL    insulin glargine (LANTUS) injection 10 Units    NOREPINephrine (LEVOPHED) 32,000 mcg in dextrose 5% 250 mL (128 mcg/mL) infusion    propofol (DIPRIVAN) 10 mg/mL infusion    0.9% sodium chloride infusion 250 mL    sodium hypochlorite (HALF STRENGTH DAKIN'S) 0.25% irrigation (bottle)    glucose chewable tablet 16 g    glucagon (GLUCAGEN) injection 1 mg    insulin lispro (HUMALOG) injection    Vancomycin Pharmacy Dosing    sodium chloride (NS) flush 5-40 mL    acetaminophen (TYLENOL) tablet 650 mg    Or    acetaminophen (TYLENOL) suppository 650 mg    polyethylene glycol (MIRALAX) packet 17 g    ondansetron (ZOFRAN ODT) tablet 4 mg    Or    ondansetron (ZOFRAN) injection 4 mg    [Held by provider] enoxaparin (LOVENOX) injection 40 mg    famotidine (PEPCID) tablet 20 mg    acidophilus-pectin, citrus tablet 2 Tablet    albuterol-ipratropium (DUO-NEB) 2.5 MG-0.5 MG/3 ML    artificial saliva (MOUTH KOTE) 1 Spray    aspirin delayed-release tablet 81 mg    brimonidine (ALPHAGAN) 0.2 % ophthalmic solution 1 Drop    cholestyramine-aspartame (QUESTRAN LIGHT) packet 4 g    [Held by provider] fenofibrate nanocrystallized (TRICOR) tablet 48 mg    ferrous sulfate tablet 325 mg    gabapentin (NEURONTIN) capsule 300 mg    insulin glargine (LANTUS) injection 50 Units    latanoprost (XALATAN) 0.005 % ophthalmic solution 1 Drop    traMADoL (ULTRAM) tablet 25 mg        Labs:    Recent Labs     05/23/22  0620 05/23/22  0420 05/22/22  0400 05/21/22  0500   WBC  --  17.2* 17.3* 12.9*   HGB  --  9.0* 8.7* 8.5*   PLT  --  192 202 226   INR 1.5*  --  1.7* 1.6*     Recent Labs     05/23/22  0420 05/22/22  0400 05/21/22  0515 05/21/22  0500   *  147* 147*  --  148*   K 4.1  4.1 3.7  --  3.4*   *  120* 119*  --  117*   CO2 18*  18* 22  --  22   *  240* 219*  --  232*   BUN 49*  48* 43*  --  48*   CREA 1.09*  1.05* 1.09*  --  1.16*   CA 8.0*  7.9* 8.0*  --  8.1*   MG 2.4  --  2.5*  --    PHOS 3.4  3.4  --   --   --    ALB 1.5*  1.5* 1.6*  --  1.7*   * 833*  --  1,313*     Recent Labs     05/23/22  0415 05/22/22  0332 05/21/22  0430   PH 7.37 7.41 7.41   PCO2 29* 28* 32*   PO2 100 85 99   HCO3 18* 19* 21*   FIO2 30.0 30.0 30.0   5/22 AC 12  PEEP 5 FiO2 30%  5/18 echo ejection fraction 20% mild mitral regurg left atrium 3.5 cm RVSP 35  , 1313, 1361    Lab Results   Component Value Date/Time    Culture result: Rare Normal respiratory yasmine 05/19/2022 02:48 PM    Culture result: Heavy Acinetobacter baumannii complex 05/15/2022 07:15 PM    Culture result: Heavy Diphtheroids 05/15/2022 07:15 PM     Lab Results   Component Value Date/Time    TSH 2.880 2016 10:13 AM        Imagin/22 chest x-ray with ET tube in place hazy accentuated basilar markings with small amount of fluid in the minor fissure  Results from Hospital Encounter encounter on 22    XR CHEST PORT    Narrative  Chest.    Impression  No significant change allowing for difference in technique and  positioning. Single portable AP view compared to yesterday. Suboptimal inspiratory film  compromises visualization of the lower lung fields. Monitoring equipment  overlies the body. The tip of the tube is approximately 3 cm above the jennifer. Mild apparent cardiomegaly. Left heart border and left diaphragm obscured. Hazy airspace opacification both lung bases may be a combination of atelectasis,  pneumonia, or edema. No large effusion. Negative for pneumothorax. Results from [place] encounter on 22    CT HEAD WO CONT    Narrative  PROCEDURE: CT HEAD WO CONT    HISTORY:Altered mental status    COMPARISON:Head CT 3 March 2022    Department policy stipulates all CT scans at this facility are performed using  dose reduction optimization techniques as appropriate to the performed exam,  including the following: Automated exposure control, adjustments of the mA  and/or KVP according to the patient size, and the use of iterative  reconstruction technique. TECHNIQUE: Without IV contrast    Bones/extracranial soft tissues:  Maxillary sinuses and right frontal sinus  remains nearly completely opacified. Extra-axial spaces:  No hemorrhage, mass or focal fluid collection. Ventricles/sulci:  There is mild dilatation of the ventricles. Sulci are  prominent. Brain parenchyma: An area of encephalomalacia in the left frontal lobe is  showing chronic evolution without evidence of hemorrhagic conversion.  No other  focal lesions identified. No mass effect. There is good gray-white differentiation. There are  inhomogeneous areas of mildly decreased density in periventricular white matter. Impression  Chronic changes which appear stable. No acute or active intracranial process. Chronic paranasal sinus disease      5/20 intubated on ventilator we will continue current vent settings patient is supposed to go for surgery because of transaminitis elevated liver enzyme we will wait as per surgeon for sacral wound debridement and diverting loop colostomy off Levophed, improvement in AST and ALT  5/21 continue with the current vent settings ABG acceptable liver enzyme improving awaiting for the surgery  5/22 maintain ventilator as is in anticipation of surgery.   Renal function improved mild congestion on chest x-ray we will give 1 dose Lasix and potassium  5/23 patient is going for surgery today we will leave ventilator as it is INR is 1.5  Time of care 30 minutes

## 2022-05-23 NOTE — ANESTHESIA PREPROCEDURE EVALUATION
Relevant Problems   NEUROLOGY   (+) CVA (cerebral vascular accident) ([facility]      CARDIOVASCULAR   (+) HTN (hypertension)   (+) PAD (peripheral artery disease) (HCC)      RENAL FAILURE   (+) RICARDO (acute kidney injury) ([facility]      ENDOCRINE   (+) DM type 2 causing vascular disease (HCC)   (+) Type 2 diabetes mellitus with nephropathy (HCC)      HEMATOLOGY   (+) Acute osteomyelitis of ankle or foot (HCC)       Anesthetic History   No history of anesthetic complications            Review of Systems / Medical History  Patient summary reviewed, nursing notes reviewed and pertinent labs reviewed    Pulmonary  Within defined limits                 Neuro/Psych       CVA  TIA    Comments: Metabolic Encephalopathy  Peripheral Neuropathy Cardiovascular    Hypertension          Past MI, CAD, PAD and hyperlipidemia      Comments: 4/2022 TTE:  Interpretation Summary    Left Ventricle: Left ventricle size is normal. Normal wall thickness. Normal wall motion. Low normal left ventricular systolic function with a visually estimated EF of 50 - 55%.   Mitral Valve: Mild transvalvular regurgitation.   Tricuspid Valve: Moderate transvalvular regurgitation.    GI/Hepatic/Renal       Hepatitis: type C  Renal disease: CRI       Endo/Other    Diabetes: type 2, using insulin    Blood dyscrasia and anemia     Other Findings            Past Medical History:   Diagnosis Date    Anemia     Chronic kidney disease     Diabetes mellitus ([facility]     Hemiplegia affecting dominant side, post-stroke ([facility] [address] 05/04/2022    HTN (hypertension)     Hyperkalemia     Hyperlipidemia     Ill-defined condition     Neuropathy     [facility]AD [facility]peripheral [facility] disease) ([facility])     PCI    Sciatica     Stroke ([facility] [address] [facility]     UTI (urinary tract infection)        Past Surgical History:   Procedure Laterality Date    HX AMPUTATION Right     great toe    HX CERVICAL FUSION      HX GI      HX OTHER SURGICAL Bilateral     Stent placement    HX OTHER SURGICAL      HX VASCULAR STENT Bilateral     2 in right 1 in left    HX VASCULAR STENT Bilateral        Current Outpatient Medications   Medication Instructions    acidophilus-pectin, citrus 25 million cell -100 mg tab tablet 2 Tablets, Oral, DAILY    albuterol-ipratropium (DUO-NEB) 2.5 mg-0.5 mg/3 ml nebu 3 mL, Nebulization, EVERY 6 HOURS AS NEEDED    amLODIPine (NORVASC) 5 mg, Oral, DAILY    artificial saliva (MOUTH KOTE) spra 1 Spray, Oral, 3 TIMES DAILY    aspirin delayed-release 81 mg tablet Oral, DAILY    atorvastatin (LIPITOR) 20 mg tablet TAKE 1 TABLET BY MOUTH EVERY NIGHT    brimonidine (ALPHAGAN) 0.2 % ophthalmic solution 1 Drop, Both Eyes, 3 TIMES DAILY    cholestyramine-aspartame (QUESTRAN LIGHT) 4 gram packet 4 g, Oral, 2 TIMES DAILY WITH MEALS    fenofibrate nanocrystallized (TRICOR) 48 mg, Oral    ferrous sulfate 325 mg, Oral, 2 TIMES DAILY    gabapentin (NEURONTIN) 300 mg, Oral, 2 TIMES DAILY    hydrALAZINE (APRESOLINE) 50 mg, Oral, 3 TIMES DAILY    insulin glargine (LANTUS) 50 Units, SubCUTAneous, DAILY    latanoprost (XALATAN) 0.005 % ophthalmic solution 1 Drop, Right Eye, EVERY EVENING    metoprolol tartrate (LOPRESSOR) 50 mg, Oral, 2 TIMES DAILY    traMADoL (ULTRAM) 25 mg, Oral, EVERY 12 HOURS AS NEEDED       Current Facility-Administered Medications   Medication Dose Route Frequency    insulin lispro (HUMALOG) injection   SubCUTAneous Q6H    glucose chewable tablet 16 g  4 Tablet Oral PRN    glucagon (GLUCAGEN) injection 1 mg  1 mg IntraMUSCular PRN    dextrose 10% infusion 0-250 mL  0-250 mL IntraVENous PRN    [START ON 5/25/2022] Vancomycin random level to be drawn on 5/25/22 at 0400 am.   Other ONCE    0.45% sodium chloride infusion  50 mL/hr IntraVENous CONTINUOUS    hydrALAZINE (APRESOLINE) tablet 25 mg  25 mg Oral TID    sacubitriL-valsartan (ENTRESTO) 24-26 mg tablet 1 Tablet  1 Tablet Oral Q12H    metoprolol succinate (TOPROL-XL) XL tablet 25 mg  25 mg Oral DAILY    ampicillin-sulbactam (UNASYN) 3 g in 0.9% sodium chloride (MBP/ADV) 100 mL MBP  3 g IntraVENous Q8H    dextrose 10% infusion 0-250 mL  0-250 mL IntraVENous PRN    insulin glargine (LANTUS) injection 10 Units  10 Units SubCUTAneous QHS    NOREPINephrine (LEVOPHED) 32,000 mcg in dextrose 5% 250 mL (128 mcg/mL) infusion  2 mcg/min IntraVENous TITRATE    propofol (DIPRIVAN) 10 mg/mL infusion  0-50 mcg/kg/min IntraVENous TITRATE    0.9% sodium chloride infusion 250 mL  250 mL IntraVENous PRN    sodium hypochlorite (HALF STRENGTH DAKIN'S) 0.25% irrigation (bottle)   Topical BID    Vancomycin Pharmacy Dosing   Other Rx Dosing/Monitoring    sodium chloride (NS) flush 5-40 mL  5-40 mL IntraVENous Q8H    acetaminophen (TYLENOL) tablet 650 mg  650 mg Oral Q6H PRN    Or    acetaminophen (TYLENOL) suppository 650 mg  650 mg Rectal Q6H PRN    polyethylene glycol (MIRALAX) packet 17 g  17 g Oral DAILY PRN    ondansetron (ZOFRAN ODT) tablet 4 mg  4 mg Oral Q8H PRN    Or    ondansetron (ZOFRAN) injection 4 mg  4 mg IntraVENous Q6H PRN    [Held by provider] enoxaparin (LOVENOX) injection 40 mg  40 mg SubCUTAneous DAILY    famotidine (PEPCID) tablet 20 mg  20 mg Oral BID    acidophilus-pectin, citrus tablet 2 Tablet  2 Tablet Oral DAILY    albuterol-ipratropium (DUO-NEB) 2.5 MG-0.5 MG/3 ML  3 mL Nebulization Q6H PRN    artificial saliva (MOUTH KOTE) 1 Spray  1 Spray Oral TID    aspirin delayed-release tablet 81 mg  81 mg Oral DAILY    brimonidine (ALPHAGAN) 0.2 % ophthalmic solution 1 Drop  1 Drop Both Eyes TID    cholestyramine-aspartame (QUESTRAN LIGHT) packet 4 g  4 g Oral BID WITH MEALS    [Held by provider] fenofibrate nanocrystallized (TRICOR) tablet 48 mg  48 mg Oral DAILY    ferrous sulfate tablet 325 mg  325 mg Oral BID    gabapentin (NEURONTIN) capsule 300 mg  300 mg Oral BID    insulin glargine (LANTUS) injection 50 Units  50 Units SubCUTAneous DAILY    latanoprost (XALATAN) 0.005 % ophthalmic solution 1 Drop  1 Drop Right Eye QPM    traMADoL (ULTRAM) tablet 25 mg  25 mg Oral Q12H PRN       Patient Vitals for the past 24 hrs:   Temp Pulse Resp BP SpO2   05/23/22 1110 -- 64 12 -- 100 %   05/23/22 1000 -- 61 12 (!) 104/50 100 %   05/23/22 0913 -- 62 12 -- 100 %   05/23/22 0900 -- 60 12 (!) 102/50 100 %   05/23/22 0805 -- 63 -- (!) 110/55 --   05/23/22 0800 -- 63 12 (!) 108/57 100 %   05/23/22 0700 36.6 °C (97.9 °F) 61 12 (!) 110/55 100 %   05/23/22 0600 -- 65 13 (!) 113/57 100 %   05/23/22 0500 -- 64 13 (!) 117/54 100 %   05/23/22 0418 -- 64 13 -- 100 %   05/23/22 0400 36.7 °C (98.1 °F) 63 14 (!) 116/56 100 %   05/23/22 0300 -- 64 13 (!) 117/56 100 %   05/23/22 0200 -- 64 13 (!) 113/59 100 %   05/23/22 0108 -- 61 12 -- 100 %   05/23/22 0100 -- 61 12 (!) 107/50 100 %   05/23/22 0000 36.9 °C (98.4 °F) 63 12 (!) 114/55 100 %   05/22/22 2300 -- 62 12 (!) 110/54 100 %   05/22/22 2200 -- 62 13 (!) 110/58 100 %   05/22/22 2100 -- 62 12 (!) 115/59 100 %   05/22/22 2014 -- 62 12 -- 100 %   05/22/22 2000 36.9 °C (98.4 °F) 64 12 (!) 120/57 100 %   05/22/22 1900 -- 63 12 (!) 119/53 100 %   05/22/22 1800 -- 64 12 (!) 117/58 100 %   05/22/22 1720 -- -- -- (!) 107/53 --   05/22/22 1700 -- 63 13 (!) 100/52 100 %   05/22/22 1600 -- 62 12 (!) 104/56 100 %   05/22/22 1500 36.4 °C (97.6 °F) 63 13 (!) 110/50 100 %   05/22/22 1400 -- 65 12 (!) 113/53 100 %       Lab Results   Component Value Date/Time    WBC 17.2 (H) 05/23/2022 04:20 AM    HGB 9.0 (L) 05/23/2022 04:20 AM    HCT 28.6 (L) 05/23/2022 04:20 AM    PLATELET 116 13/06/6499 04:20 AM    MCV 90.5 05/23/2022 04:20 AM     Lab Results   Component Value Date/Time    Sodium 147 (H) 05/23/2022 04:20 AM    Sodium 147 (H) 05/23/2022 04:20 AM    Potassium 4.1 05/23/2022 04:20 AM    Potassium 4.1 05/23/2022 04:20 AM    Chloride 120 (H) 05/23/2022 04:20 AM    Chloride 120 (H) 05/23/2022 04:20 AM    CO2 18 (L) 05/23/2022 04:20 AM    CO2 18 (L) 05/23/2022 04:20 AM    Anion gap 9 05/23/2022 04:20 AM    Anion gap 9 05/23/2022 04:20 AM    Glucose 240 (H) 05/23/2022 04:20 AM    Glucose 238 (H) 05/23/2022 04:20 AM    BUN 48 (H) 05/23/2022 04:20 AM    BUN 49 (H) 05/23/2022 04:20 AM    Creatinine 1.05 (H) 05/23/2022 04:20 AM    Creatinine 1.09 (H) 05/23/2022 04:20 AM    BUN/Creatinine ratio 46 (H) 05/23/2022 04:20 AM    BUN/Creatinine ratio 45 (H) 05/23/2022 04:20 AM    GFR est AA >60 05/23/2022 04:20 AM    GFR est AA 59 (L) 05/23/2022 04:20 AM    GFR est non-AA 51 (L) 05/23/2022 04:20 AM    GFR est non-AA 49 (L) 05/23/2022 04:20 AM    Calcium 7.9 (L) 05/23/2022 04:20 AM    Calcium 8.0 (L) 05/23/2022 04:20 AM     Lab Results   Component Value Date/Time    PTP 18.7 (H) 05/23/2022 06:20 AM    INR 1.5 (H) 05/23/2022 06:20 AM     Lab Results   Component Value Date/Time    Glucose 240 (H) 05/23/2022 04:20 AM    Glucose 238 (H) 05/23/2022 04:20 AM    Glucose (POC) 191 (H) 05/23/2022 10:57 AM     Physical Exam    Airway          Intubated   Cardiovascular    Rhythm: regular  Rate: normal         Dental         Pulmonary      Decreased breath sounds: bilateral           Abdominal  GI exam deferred       Other Findings            Anesthetic Plan    ASA: 4, emergent  Anesthesia type: general    Monitoring Plan: Arterial line      Induction: Inhalational  Anesthetic plan and risks discussed with: Son / Daughter, Patient and Family      Telephone Consent obtained from patient's daughter, Dirk Belle [phone]. Also discussed with the patient's daughter the plan for the patient to go to the ICU intubated and sedated post-operatively.

## 2022-05-23 NOTE — PROGRESS NOTES
Remains acute in ICU on vent. Accepted to two different SNF facilities'. Family to decide, as [employer] will be needed.           Ivan Johnson, MSW   63yo M with Past Medical History CAD status post CABG, Chronic Systolic CHF (11%), DM (on insulin pump), pulmonary hypertension, Celiac disease, diverticulitis, status post right total knee replacement October 2019 complicated by infection status post an extended course of IV antibiotics admitted status post mechanical fall complicated by right hip fracture.  Status post ORIF.    IMPRESSION:  #1/4 BURN selective debridement of wound right knee, debridement and skin graft right knee, right thigh donor, wound vac.  WCX serratia and ecoli (R fluoro)  -no infection of underlying hardware  -will maintain on po ABx  -11/22 WCx ORSA    RECOMMENDATIONS:  D/C Clinda, PO bactrim 1 DS BID x 7 days to cover isolates in WCX

## 2022-05-23 NOTE — PROGRESS NOTES
Hematology and Oncology Progress Note    Patient: Deanne Delgado MRN: [id number]  SSN: [id number]    YOB: 1945  Age: 78 y.o. Sex: female      Admit Date: 5/14/2022    LOS: 9 days     Chief Complaint: Patient is intubated and unresponsive    Subjective:     Patient is intubated and unresponsive. No active bleeding reported. Patient's daughter is at bedside. Objective:     Vitals:    05/23/22 0900 05/23/22 0913 05/23/22 1000 05/23/22 1110   BP: (!) 102/50  (!) 104/50    Pulse: 60 62 61 64   Resp: 12 12 12 12   Temp:       SpO2: 100% 100% 100% 100%   Weight:       Height:              Physical Exam:   Constitutional: -American female. Unresponsive and intubated  Eyes: Conjunctiva is pale  Neck: No adenopathy. Respiratory: Intubated  Cardiovascular: Normal sinus rhythm. Abdomen: Soft. Nontender. No hepatosplenomegaly. No guarding or rigidity. Bowel sounds present. Skin: No petechiae; no skin rash.   Neurologic: Unresponsive    Lab/Data Review:    Recent Results (from the past 24 hour(s))   GLUCOSE, POC    Collection Time: 05/22/22  4:55 PM   Result Value Ref Range    Glucose (POC) 262 (H) 65 - 117 mg/dL    Performed by Santa Hernandez    GLUCOSE, POC    Collection Time: 05/22/22 11:09 PM   Result Value Ref Range    Glucose (POC) 225 (H) 65 - 117 mg/dL    Performed by Ramón George    BLOOD GAS, ARTERIAL    Collection Time: 05/23/22  4:15 AM   Result Value Ref Range    pH 7.37 7.35 - 7.45      PCO2 29 (L) 35 - 45 mmHg    PO2 100 75 - 100 mmHg    O2 SAT 99 >95 %    BICARBONATE 18 (L) 22 - 26 mmol/L    BASE DEFICIT 8.1 (H) 0 - 2 mmol/L    O2 METHOD VENT      FIO2 30.0 %    MODE Assist Control/Volume Control      Tidal volume 500      SET RATE 12      EPAP/CPAP/PEEP 5.0      Sample source Arterial      SITE Arterial Line     METABOLIC PANEL, COMPREHENSIVE    Collection Time: 05/23/22  4:20 AM   Result Value Ref Range    Sodium 147 (H) 136 - 145 mmol/L    Potassium 4.1 3.5 - 5.1 mmol/L Chloride 120 (H) 97 - 108 mmol/L    CO2 18 (L) 21 - 32 mmol/L    Anion gap 9 5 - 15 mmol/L    Glucose 240 (H) 65 - 100 mg/dL    BUN 48 (H) 6 - 20 mg/dL    Creatinine 1.05 (H) 0.55 - 1.02 mg/dL    BUN/Creatinine ratio 46 (H) 12 - 20      GFR est AA >60 >60 ml/min/1.73m2    GFR est non-AA 51 (L) >60 ml/min/1.73m2    Calcium 7.9 (L) 8.5 - 10.1 mg/dL    Bilirubin, total 0.5 0.2 - 1.0 mg/dL    AST (SGOT) 58 (H) 15 - 37 U/L    ALT (SGPT) 525 (H) 12 - 78 U/L    Alk. phosphatase 98 45 - 117 U/L    Protein, total 5.9 (L) 6.4 - 8.2 g/dL    Albumin 1.5 (L) 3.5 - 5.0 g/dL    Globulin 4.4 (H) 2.0 - 4.0 g/dL    A-G Ratio 0.3 (L) 1.1 - 2.2     MAGNESIUM    Collection Time: 05/23/22  4:20 AM   Result Value Ref Range    Magnesium 2.4 1.6 - 2.4 mg/dL   PHOSPHORUS    Collection Time: 05/23/22  4:20 AM   Result Value Ref Range    Phosphorus 3.4 2.6 - 4.7 mg/dL   CBC WITH AUTOMATED DIFF    Collection Time: 05/23/22  4:20 AM   Result Value Ref Range    WBC 17.2 (H) 3.6 - 11.0 K/uL    RBC 3.16 (L) 3.80 - 5.20 M/uL    HGB 9.0 (L) 11.5 - 16.0 g/dL    HCT 28.6 (L) 35.0 - 47.0 %    MCV 90.5 80.0 - 99.0 FL    MCH 28.5 26.0 - 34.0 PG    MCHC 31.5 30.0 - 36.5 g/dL    RDW 17.3 (H) 11.5 - 14.5 %    PLATELET 300 359 - 651 K/uL    MPV 11.6 8.9 - 12.9 FL    NRBC 0.6 (H) 0.0  WBC    ABSOLUTE NRBC 0.10 (H) 0.00 - 0.01 K/uL    NEUTROPHILS 78 (H) 32 - 75 %    LYMPHOCYTES 11 (L) 12 - 49 %    MONOCYTES 6 5 - 13 %    EOSINOPHILS 3 0 - 7 %    BASOPHILS 1 0 - 1 %    IMMATURE GRANULOCYTES 1 (H) 0 - 0.5 %    ABS. NEUTROPHILS 13.6 (H) 1.8 - 8.0 K/UL    ABS. LYMPHOCYTES 1.8 0.8 - 3.5 K/UL    ABS. MONOCYTES 1.0 0.0 - 1.0 K/UL    ABS. EOSINOPHILS 0.4 0.0 - 0.4 K/UL    ABS. BASOPHILS 0.1 0.0 - 0.1 K/UL    ABS. IMM.  GRANS. 0.2 (H) 0.00 - 0.04 K/UL    DF AUTOMATED     C REACTIVE PROTEIN, QT    Collection Time: 05/23/22  4:20 AM   Result Value Ref Range    C-Reactive protein 13.90 (H) 0.00 - 0.60 mg/dL   PROCALCITONIN    Collection Time: 05/23/22  4:20 AM Result Value Ref Range    Procalcitonin 1.02 (H) 0 ng/mL   RENAL FUNCTION PANEL    Collection Time: 05/23/22  4:20 AM   Result Value Ref Range    Sodium 147 (H) 136 - 145 mmol/L    Potassium 4.1 3.5 - 5.1 mmol/L    Chloride 120 (H) 97 - 108 mmol/L    CO2 18 (L) 21 - 32 mmol/L    Anion gap 9 5 - 15 mmol/L    Glucose 238 (H) 65 - 100 mg/dL    BUN 49 (H) 6 - 20 mg/dL    Creatinine 1.09 (H) 0.55 - 1.02 mg/dL    BUN/Creatinine ratio 45 (H) 12 - 20      GFR est AA 59 (L) >60 ml/min/1.73m2    GFR est non-AA 49 (L) >60 ml/min/1.73m2    Calcium 8.0 (L) 8.5 - 10.1 mg/dL    Phosphorus 3.4 2.6 - 4.7 mg/dL    Albumin 1.5 (L) 3.5 - 5.0 g/dL   NT-PRO BNP    Collection Time: 05/23/22  4:20 AM   Result Value Ref Range    NT pro-BNP 10,574 (H) <450 pg/mL   VANCOMYCIN, RANDOM    Collection Time: 05/23/22  4:20 AM   Result Value Ref Range    Vancomycin, random 15.5 ug/mL   GLUCOSE, POC    Collection Time: 05/23/22  5:58 AM   Result Value Ref Range    Glucose (POC) 227 (H) 65 - 117 mg/dL    Performed by Ramón George    PROTHROMBIN TIME + INR    Collection Time: 05/23/22  6:20 AM   Result Value Ref Range    Prothrombin time 18.7 (H) 11.9 - 14.6 sec    INR 1.5 (H) 0.9 - 1.1     GLUCOSE, POC    Collection Time: 05/23/22  7:59 AM   Result Value Ref Range    Glucose (POC) 212 (H) 65 - 117 mg/dL    Performed by Friday Lisa    GLUCOSE, POC    Collection Time: 05/23/22 10:57 AM   Result Value Ref Range    Glucose (POC) 191 (H) 65 - 117 mg/dL    Performed by Vince Velásquez            Assessment and plan:     3)81-year-old female  admitted with severe decubitus ulcer related infection/sepsis. 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    2) patient is currently in shock secondary to sepsis/cardiogenic shock.  -She is intubated on pressor support. -Marked elevation in liver transaminases consistent with shock liver.   Improving gradually  -Also has acute renal failure.  -Currently being treated for sepsis with antibiotics.  Cardiology is also following the patient. Lalit Maldonado EF is down to 20 to 25%     3) coagulopathy: Appears to be secondary to liver failure. -Getting vitamin K 10 mg for 3 days.  -No evidence of DIC. She has normal fibrinogen and PTT. -Received 2 units of plasma for concerns for bleeding. No bleeding is reported today.  -Patient's platelet counts are normal at 192,000 today. INR has improved to 1.5 today     4) anemia: Acute on chronic anemia.  Likely secondary to CKD and some bleeding from ongoing wound problems.  -Patient has received packed RBC transfusion earlier this admission.  -Patient's hemoglobin has improved to 9 today.  -Her iron studies are normal.  W61 folic acid is pending    Transfuse for hemoglobin less than 7 g/dL       This dictation was done by dragon, computer voice recognition software. Often unanticipated grammatical, syntax, phones and other interpretive errors are inadvertently transcribed. Please excuse errors that have escaped final proofreading.        Signed By: Julisa Tellez MD     May 23, 2022

## 2022-05-23 NOTE — PROGRESS NOTES
Vancomycin Dosing Consult  Day #9 of vancomycin therapy  Consult ordered by Dr. Jeanette Clark for this 78 y.o. female for indication of decubitus ulcer infection, sepsis. Antibiotic regimen: Vancomycin + Unasyn    Temp (24hrs), Av °F (36.7 °C), Min:97.5 °F (36.4 °C), Max:98.4 °F (36.9 °C)    Recent Labs     22  0420 22  0400 22  0500   WBC 17.2* 17.3* 12.9*   CREA 1.09*  1.05* 1.09* 1.16*   BUN 49*  48* 43* 48*     Est CrCl: ~53.1 ml/min; UO: 0.6 mL/kg/hr  Concomitant nephrotoxic drugs: None    Cultures:    Wound: Moderate MDR Pseudomonas aeruginosa susceptible to Zerbaxa, aminoglycosides), moderate mixed skin yasmine, moderate mixed enteric yasmine including yeast, final   Blood: CoNS in the anaerobic bottle, final   Wound, ulcer:  Moderate Acinetobacter baumannii, moderate Enterococcus faecium, light >2 organisms (contraindicated), final  5/15 Wound, great toe: Heavy Acinetobacter baumannii complex (Zosyn resistant, susceptible to Unasyn), heavy Diphtheroids, final   Sputum: Rare normal respiratory yasmine, final    MRSA Swab: Not ordered, patient already received first dose of vancomycin    Target range: Trough 10-15 mcg/mL    Recent level history:  Date/Time Dose & Interval Measured Level (mcg/mL)    @ 1300 750 mg q12h 24.2    @ 1600 500 mg q12h 28.2    @ 1720 Held 24.9    @ 1740 Held 18.6    @ 0500 Held 17.6   22 500 mg X1 14.3   22 500 mg X1 15.5        Assessment/Plan:   Febrile this morning, procal and CRP trending down, leukocytosis worsened today  RICARDO, SCr trending down, clearance remains slow  Level within therapeutic range, will give a dose of 500 mg and check a level tomorrow AM 22 at  0400 am.  Antimicrobial stop date TBD

## 2022-05-23 NOTE — PROGRESS NOTES
Surgery critical care Progress Note     Subjective:   Patient examined in ICU. Hospital Course:  Patient currently intubated and sedated. Patient's white cell count is elevated. Hemoglobin 9.0. Creatinine level is better. Patient's liver enzymes coming down as well. Subjective: Unable to assess        Review of Systems  Unable to assess      Objective:      Visit Vitals  BP (!) 113/57 (BP 1 Location: Left upper arm, BP Patient Position: At rest)   Pulse 65   Temp 98.1 °F (36.7 °C)   Resp 13   Ht 5' 7.01\" (1.702 m)   Wt 196 lb 10.4 oz (89.2 kg)   SpO2 100%   Breastfeeding No   BMI 30.79 kg/m²       Patient is intubated  Head and neck atraumatic, normocephalic. ENT: No hoarse voice  Cardiac system regular rate rhythm. Pulmonary no audible wheeze  Chest wall excursion normal with respiration cycle  Abdomen is soft not particularly distended. Neurologically nonfocal.  Skin is warm and moist.  Psychosocial: Unable to assess  Vascular examination as previously noted no changes. Data Review  Reviewed           Assessment / Plan:  I will order PT/INR level today. If her INR levels are less than 1.5 we will proceed with laparoscopic loop colostomy, sacral wound debridement and left foot TMA today if possible. Critical care time spent 30minutes involving direct patient care as well as reviewing patient's labs and coordination of care with nursing staff     Care Plan discussed with: Patient/Family/RN/Case Management           Total time spent with patient: 30 minutes.

## 2022-05-23 NOTE — PROGRESS NOTES
General Daily Progress Note          Patient Name:   Eli Laogs       YOB: 1945       Age:  78 y.o. 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  Cipro    Admitted again yesterday concerning for worsening of sacral ulcer    During last admission patient was seen by infectious disease and also seen by the vascular surgeon patient had debridement of wound during the last admission    Patient seen by the infectious disease yesterday    Sacral wound infection recent cultures growing Pseudomonas resistant to Zosyn and meropenem    5/17    Patient alert awake not in distress  Patient was seen by the vascular surgeon    Vascular surgery planned for laparoscopic loop colostomy placement and sacral debridement then wound vacuum  Also try to close the wound on the left foot with TMA    5/18    Resting in the bed not in any distress  Blood sugars running high    Seen by infectious disease continue vancomycin and start on Unasyn    5/19    And went to the OR intubated because of elevated LFT and elevated INR  Surgery was canceled    On ventilator 40% O2  Propofol drip    5/20    Patient on ventilator with 30% O2  On propofol drip    Hemoglobin dropped to 6.8    Patient's daughter at the bedside    5/20    Patient on ventilator 30% O2  On propofol drip  Getting PEG tube feeding    5/21    Patient still on ventilator 30% O2  On propofol drip  Liver Function improving    5/22  On ventilator with 30% O2 on propofol drip    Objective:     Visit Vitals  BP (!) 110/55   Pulse 62   Temp 97.9 °F (36.6 °C)   Resp 12   Ht 5' 7.01\" (1.702 m) Wt 89.2 kg (196 lb 10.4 oz)   SpO2 100%   Breastfeeding No   BMI 30.79 kg/m²        Recent Results (from the past 24 hour(s))   GLUCOSE, POC    Collection Time: 05/22/22 11:24 AM   Result Value Ref Range    Glucose (POC) 214 (H) 65 - 117 mg/dL    Performed by Santa Hernandez    GLUCOSE, POC    Collection Time: 05/22/22  4:55 PM   Result Value Ref Range    Glucose (POC) 262 (H) 65 - 117 mg/dL    Performed by Santa Hernandez    GLUCOSE, POC    Collection Time: 05/22/22 11:09 PM   Result Value Ref Range    Glucose (POC) 225 (H) 65 - 117 mg/dL    Performed by Ileana Mena    BLOOD GAS, ARTERIAL    Collection Time: 05/23/22  4:15 AM   Result Value Ref Range    pH 7.37 7.35 - 7.45      PCO2 29 (L) 35 - 45 mmHg    PO2 100 75 - 100 mmHg    O2 SAT 99 >95 %    BICARBONATE 18 (L) 22 - 26 mmol/L    BASE DEFICIT 8.1 (H) 0 - 2 mmol/L    O2 METHOD VENT      FIO2 30.0 %    MODE Assist Control/Volume Control      Tidal volume 500      SET RATE 12      EPAP/CPAP/PEEP 5.0      Sample source Arterial      SITE Arterial Line     METABOLIC PANEL, COMPREHENSIVE    Collection Time: 05/23/22  4:20 AM   Result Value Ref Range    Sodium 147 (H) 136 - 145 mmol/L    Potassium 4.1 3.5 - 5.1 mmol/L    Chloride 120 (H) 97 - 108 mmol/L    CO2 18 (L) 21 - 32 mmol/L    Anion gap 9 5 - 15 mmol/L    Glucose 240 (H) 65 - 100 mg/dL    BUN 48 (H) 6 - 20 mg/dL    Creatinine 1.05 (H) 0.55 - 1.02 mg/dL    BUN/Creatinine ratio 46 (H) 12 - 20      GFR est AA >60 >60 ml/min/1.73m2    GFR est non-AA 51 (L) >60 ml/min/1.73m2    Calcium 7.9 (L) 8.5 - 10.1 mg/dL    Bilirubin, total 0.5 0.2 - 1.0 mg/dL    AST (SGOT) 58 (H) 15 - 37 U/L    ALT (SGPT) 525 (H) 12 - 78 U/L    Alk.  phosphatase 98 45 - 117 U/L    Protein, total 5.9 (L) 6.4 - 8.2 g/dL    Albumin 1.5 (L) 3.5 - 5.0 g/dL    Globulin 4.4 (H) 2.0 - 4.0 g/dL    A-G Ratio 0.3 (L) 1.1 - 2.2     MAGNESIUM    Collection Time: 05/23/22  4:20 AM   Result Value Ref Range    Magnesium 2.4 1.6 - 2.4 mg/dL   PHOSPHORUS Collection Time: 05/23/22  4:20 AM   Result Value Ref Range    Phosphorus 3.4 2.6 - 4.7 mg/dL   CBC WITH AUTOMATED DIFF    Collection Time: 05/23/22  4:20 AM   Result Value Ref Range    WBC 17.2 (H) 3.6 - 11.0 K/uL    RBC 3.16 (L) 3.80 - 5.20 M/uL    HGB 9.0 (L) 11.5 - 16.0 g/dL    HCT 28.6 (L) 35.0 - 47.0 %    MCV 90.5 80.0 - 99.0 FL    MCH 28.5 26.0 - 34.0 PG    MCHC 31.5 30.0 - 36.5 g/dL    RDW 17.3 (H) 11.5 - 14.5 %    PLATELET 831 122 - 456 K/uL    MPV 11.6 8.9 - 12.9 FL    NRBC 0.6 (H) 0.0  WBC    ABSOLUTE NRBC 0.10 (H) 0.00 - 0.01 K/uL    NEUTROPHILS 78 (H) 32 - 75 %    LYMPHOCYTES 11 (L) 12 - 49 %    MONOCYTES 6 5 - 13 %    EOSINOPHILS 3 0 - 7 %    BASOPHILS 1 0 - 1 %    IMMATURE GRANULOCYTES 1 (H) 0 - 0.5 %    ABS. NEUTROPHILS 13.6 (H) 1.8 - 8.0 K/UL    ABS. LYMPHOCYTES 1.8 0.8 - 3.5 K/UL    ABS. MONOCYTES 1.0 0.0 - 1.0 K/UL    ABS. EOSINOPHILS 0.4 0.0 - 0.4 K/UL    ABS. BASOPHILS 0.1 0.0 - 0.1 K/UL    ABS. IMM.  GRANS. 0.2 (H) 0.00 - 0.04 K/UL    DF AUTOMATED     C REACTIVE PROTEIN, QT    Collection Time: 05/23/22  4:20 AM   Result Value Ref Range    C-Reactive protein 13.90 (H) 0.00 - 0.60 mg/dL   PROCALCITONIN    Collection Time: 05/23/22  4:20 AM   Result Value Ref Range    Procalcitonin 1.02 (H) 0 ng/mL   RENAL FUNCTION PANEL    Collection Time: 05/23/22  4:20 AM   Result Value Ref Range    Sodium 147 (H) 136 - 145 mmol/L    Potassium 4.1 3.5 - 5.1 mmol/L    Chloride 120 (H) 97 - 108 mmol/L    CO2 18 (L) 21 - 32 mmol/L    Anion gap 9 5 - 15 mmol/L    Glucose 238 (H) 65 - 100 mg/dL    BUN 49 (H) 6 - 20 mg/dL    Creatinine 1.09 (H) 0.55 - 1.02 mg/dL    BUN/Creatinine ratio 45 (H) 12 - 20      GFR est AA 59 (L) >60 ml/min/1.73m2    GFR est non-AA 49 (L) >60 ml/min/1.73m2    Calcium 8.0 (L) 8.5 - 10.1 mg/dL    Phosphorus 3.4 2.6 - 4.7 mg/dL    Albumin 1.5 (L) 3.5 - 5.0 g/dL   NT-PRO BNP    Collection Time: 05/23/22  4:20 AM   Result Value Ref Range    NT pro-BNP 10,574 (H) <450 pg/mL   VANCOMYCIN, RANDOM    Collection Time: 05/23/22  4:20 AM   Result Value Ref Range    Vancomycin, random 15.5 ug/mL   GLUCOSE, POC    Collection Time: 05/23/22  5:58 AM   Result Value Ref Range    Glucose (POC) 227 (H) 65 - 117 mg/dL    Performed by Cecilia Michael 112 + INR    Collection Time: 05/23/22  6:20 AM   Result Value Ref Range    Prothrombin time 18.7 (H) 11.9 - 14.6 sec    INR 1.5 (H) 0.9 - 1.1     GLUCOSE, POC    Collection Time: 05/23/22  7:59 AM   Result Value Ref Range    Glucose (POC) 212 (H) 65 - 117 mg/dL    Performed by Friday Lisa      [unfilled]      Review of Systems    Not a good historian e. Physical Exam:      Constitutional: On ventilator  HENT:   Head: Normocephalic and atraumatic. Eyes: Pupils are equal, round, and reactive to light. EOM are normal.   Cardiovascular: Normal rate, regular rhythm and normal heart sounds. Pulmonary/Chest: Breath sounds normal. No wheezes. No rales. Exhibits no tenderness. Abdominal: Soft. Bowel sounds are normal. There is no abdominal tenderness. There is no rebound and no guarding. Musculoskeletal: Normal range of motion. Neurological: On ventilator   skin sacral decubitus ulcer and left foot wound right big toe amputation    XR CHEST PORT   Final Result   Bibasilar opacities, possibly increased on the right. XR CHEST PORT   Final Result   No significant change allowing for difference in technique and   positioning. Single portable AP view compared to yesterday. Suboptimal inspiratory film   compromises visualization of the lower lung fields. Monitoring equipment   overlies the body. The tip of the tube is approximately 3 cm above the jennifer. Mild apparent cardiomegaly. Left heart border and left diaphragm obscured. Hazy airspace opacification both lung bases may be a combination of atelectasis,   pneumonia, or edema. No large effusion. Negative for pneumothorax.       XR CHEST PORT   Final Result   No significant change allowing for difference in technique and   positioning. Single portable AP view compared to yesterday. Rotation to the right. Monitoring   equipment overlies the body. Suboptimal inspiratory film compromises   visualization of the lower lung fields. Mild cardiomegaly. The tip of the ET tube is approximately 3 cm above the jennifer. Mild basal interstitial edema. No new consolidation. No pleural effusion or   pneumothorax. XR CHEST PORT   Final Result   1. The endotracheal tube is in satisfactory position. 2.  There has been a partial interval resolution in the pulmonary interstitial   edema pattern. XR CHEST PORT   Final Result   Ill-defined haziness in the mid to lower lung zones suspect for mild   atelectatic changes and/or interstitial fluid or pleural fluid. US ABD LTD   Final Result   1. Question pericholecystic fluid. No identified gallstones or sludge. 2. Right pleural effusion. 3. Increased right renal echotexture for medical renal disease. XR CHEST PORT   Final Result   Intubation. Findings suggesting hydrostatic edema. XR CHEST PORT   Final Result   No evidence of an acute cardiopulmonary process.       XR CHEST PORT    (Results Pending)        Recent Results (from the past 24 hour(s))   GLUCOSE, POC    Collection Time: 05/22/22 11:24 AM   Result Value Ref Range    Glucose (POC) 214 (H) 65 - 117 mg/dL    Performed by Santa Hernandez    GLUCOSE, POC    Collection Time: 05/22/22  4:55 PM   Result Value Ref Range    Glucose (POC) 262 (H) 65 - 117 mg/dL    Performed by Santa Hernandez    GLUCOSE, POC    Collection Time: 05/22/22 11:09 PM   Result Value Ref Range    Glucose (POC) 225 (H) 65 - 117 mg/dL    Performed by Estela Paz    BLOOD GAS, ARTERIAL    Collection Time: 05/23/22  4:15 AM   Result Value Ref Range    pH 7.37 7.35 - 7.45      PCO2 29 (L) 35 - 45 mmHg    PO2 100 75 - 100 mmHg    O2 SAT 99 >95 %    BICARBONATE 18 (L) 22 - 26 mmol/L    BASE DEFICIT 8.1 (H) 0 - 2 mmol/L    O2 METHOD VENT      FIO2 30.0 %    MODE Assist Control/Volume Control      Tidal volume 500      SET RATE 12      EPAP/CPAP/PEEP 5.0      Sample source Arterial      SITE Arterial Line     METABOLIC PANEL, COMPREHENSIVE    Collection Time: 05/23/22  4:20 AM   Result Value Ref Range    Sodium 147 (H) 136 - 145 mmol/L    Potassium 4.1 3.5 - 5.1 mmol/L    Chloride 120 (H) 97 - 108 mmol/L    CO2 18 (L) 21 - 32 mmol/L    Anion gap 9 5 - 15 mmol/L    Glucose 240 (H) 65 - 100 mg/dL    BUN 48 (H) 6 - 20 mg/dL    Creatinine 1.05 (H) 0.55 - 1.02 mg/dL    BUN/Creatinine ratio 46 (H) 12 - 20      GFR est AA >60 >60 ml/min/1.73m2    GFR est non-AA 51 (L) >60 ml/min/1.73m2    Calcium 7.9 (L) 8.5 - 10.1 mg/dL    Bilirubin, total 0.5 0.2 - 1.0 mg/dL    AST (SGOT) 58 (H) 15 - 37 U/L    ALT (SGPT) 525 (H) 12 - 78 U/L    Alk. phosphatase 98 45 - 117 U/L    Protein, total 5.9 (L) 6.4 - 8.2 g/dL    Albumin 1.5 (L) 3.5 - 5.0 g/dL    Globulin 4.4 (H) 2.0 - 4.0 g/dL    A-G Ratio 0.3 (L) 1.1 - 2.2     MAGNESIUM    Collection Time: 05/23/22  4:20 AM   Result Value Ref Range    Magnesium 2.4 1.6 - 2.4 mg/dL   PHOSPHORUS    Collection Time: 05/23/22  4:20 AM   Result Value Ref Range    Phosphorus 3.4 2.6 - 4.7 mg/dL   CBC WITH AUTOMATED DIFF    Collection Time: 05/23/22  4:20 AM   Result Value Ref Range    WBC 17.2 (H) 3.6 - 11.0 K/uL    RBC 3.16 (L) 3.80 - 5.20 M/uL    HGB 9.0 (L) 11.5 - 16.0 g/dL    HCT 28.6 (L) 35.0 - 47.0 %    MCV 90.5 80.0 - 99.0 FL    MCH 28.5 26.0 - 34.0 PG    MCHC 31.5 30.0 - 36.5 g/dL    RDW 17.3 (H) 11.5 - 14.5 %    PLATELET 679 364 - 979 K/uL    MPV 11.6 8.9 - 12.9 FL    NRBC 0.6 (H) 0.0  WBC    ABSOLUTE NRBC 0.10 (H) 0.00 - 0.01 K/uL    NEUTROPHILS 78 (H) 32 - 75 %    LYMPHOCYTES 11 (L) 12 - 49 %    MONOCYTES 6 5 - 13 %    EOSINOPHILS 3 0 - 7 %    BASOPHILS 1 0 - 1 %    IMMATURE GRANULOCYTES 1 (H) 0 - 0.5 %    ABS. NEUTROPHILS 13.6 (H) 1.8 - 8.0 K/UL    ABS.  LYMPHOCYTES 1.8 0.8 - 3.5 K/UL ABS. MONOCYTES 1.0 0.0 - 1.0 K/UL    ABS. EOSINOPHILS 0.4 0.0 - 0.4 K/UL    ABS. BASOPHILS 0.1 0.0 - 0.1 K/UL    ABS. IMM.  GRANS. 0.2 (H) 0.00 - 0.04 K/UL    DF AUTOMATED     C REACTIVE PROTEIN, QT    Collection Time: 05/23/22  4:20 AM   Result Value Ref Range    C-Reactive protein 13.90 (H) 0.00 - 0.60 mg/dL   PROCALCITONIN    Collection Time: 05/23/22  4:20 AM   Result Value Ref Range    Procalcitonin 1.02 (H) 0 ng/mL   RENAL FUNCTION PANEL    Collection Time: 05/23/22  4:20 AM   Result Value Ref Range    Sodium 147 (H) 136 - 145 mmol/L    Potassium 4.1 3.5 - 5.1 mmol/L    Chloride 120 (H) 97 - 108 mmol/L    CO2 18 (L) 21 - 32 mmol/L    Anion gap 9 5 - 15 mmol/L    Glucose 238 (H) 65 - 100 mg/dL    BUN 49 (H) 6 - 20 mg/dL    Creatinine 1.09 (H) 0.55 - 1.02 mg/dL    BUN/Creatinine ratio 45 (H) 12 - 20      GFR est AA 59 (L) >60 ml/min/1.73m2    GFR est non-AA 49 (L) >60 ml/min/1.73m2    Calcium 8.0 (L) 8.5 - 10.1 mg/dL    Phosphorus 3.4 2.6 - 4.7 mg/dL    Albumin 1.5 (L) 3.5 - 5.0 g/dL   NT-PRO BNP    Collection Time: 05/23/22  4:20 AM   Result Value Ref Range    NT pro-BNP 10,574 (H) <450 pg/mL   VANCOMYCIN, RANDOM    Collection Time: 05/23/22  4:20 AM   Result Value Ref Range    Vancomycin, random 15.5 ug/mL   GLUCOSE, POC    Collection Time: 05/23/22  5:58 AM   Result Value Ref Range    Glucose (POC) 227 (H) 65 - 117 mg/dL    Performed by Guerline Thorne    PROTHROMBIN TIME + INR    Collection Time: 05/23/22  6:20 AM   Result Value Ref Range    Prothrombin time 18.7 (H) 11.9 - 14.6 sec    INR 1.5 (H) 0.9 - 1.1     GLUCOSE, POC    Collection Time: 05/23/22  7:59 AM   Result Value Ref Range    Glucose (POC) 212 (H) 65 - 117 mg/dL    Performed by Friday Lisa        Results     Procedure Component Value Units Date/Time    CULTURE, RESPIRATORY/SPUTUM/BRONCH Kyleigh Castellanos STAIN [[id number]] Collected: 05/19/22 1448    Order Status: Completed Specimen: Sputum Updated: 05/21/22 0828     Special Requests: No Special Requests GRAM STAIN 1+ WBCs seen         no epithelial cells seen         No organisms seen        Culture result:       Rare Normal respiratory yasmine          CULTURE, Alessandra Aviles STAIN [[id number]]  (Susceptibility) Collected: 05/15/22 1915    Order Status: Completed Specimen: Wound from Great Toe Updated: 05/18/22 0835     Special Requests: No Special Requests        GRAM STAIN       3+ Gram Positive Rods (Coryneform)                  1+ apparent Gram Negative Rods           Culture result:       Heavy Acinetobacter baumannii complex            Heavy Diphtheroids       Susceptibility      Acinetobacter baumannii complex     GEMA     Ampicillin/sulbactam ($) Susceptible     Cefepime ($$) Susceptible     Ceftazidime ($) Susceptible     Ceftriaxone ($) Intermediate     Ciprofloxacin ($) Resistant     Gentamicin ($) Susceptible     Levofloxacin ($) Resistant     Meropenem ($$) Resistant     Piperacillin/Tazobac ($) Resistant     Tobramycin ($) Susceptible     Trimeth/Sulfa Susceptible                  Linear View                   CULTURE, Alessandra Aviles STAIN [[id number]]  (Susceptibility) Collected: 05/14/22 4802    Order Status: Completed Specimen: Wound from Ulcer Updated: 05/18/22 0841     Special Requests: No Special Requests        GRAM STAIN Few WBCs seen         2+ Gram Negative Rods               Occasional Gram Positive Cocci in pairs            Rare Gram Positive Rods        Culture result: Moderate Acinetobacter baumannii complex                  Moderate Enterococcus faecium                  Light >2 organisms - contaminated specimen.  suggest recollection Anaerobic gram negative rods Beta Lactamase Positive          Susceptibility      Acinetobacter baumannii complex     GEMA     Ampicillin/sulbactam ($) Susceptible     Cefepime ($$) Susceptible     Ceftazidime ($) Susceptible     Ceftriaxone ($) Intermediate     Ciprofloxacin ($) Resistant     Gentamicin ($) Susceptible     Levofloxacin ($) Resistant Meropenem ($$) Resistant     Piperacillin/Tazobac ($) Resistant     Tobramycin ($) Susceptible     Trimeth/Sulfa Susceptible                  Linear View               Susceptibility      Enterococcus faecium     GEMA     Ampicillin ($) Resistant     Daptomycin ($$$$$)  See below  [1]      Linezolid ($$$$$) Susceptible     Vancomycin ($) Susceptible                 [1]  Dose Dependent  (SENSITIVITIES PERFORMED BY E-TEST)          Linear View                   CULTURE, BLOOD, PAIRED [[id number]]  (Abnormal) Collected: 05/14/22 2000    Order Status: Completed Specimen: Blood Updated: 05/17/22 1212     Special Requests: No Special Requests        Culture result:       Staphylococcus species, coagulase negative GROWING IN THE ANAEROBIC BOTTLE. No Site Indicated            (NOTE) PRELIMINARY REPORT OF GRAM POSITIVE COCCI IN CLUSTERS GROWING IN THE ANAEROBIC BOTTLE, CALLED TO AND READ BACK BY CANDI KNOWLES 5/16/22 1118 SCP. Labs:     Recent Labs     05/23/22  0420 05/22/22  0400   WBC 17.2* 17.3*   HGB 9.0* 8.7*   HCT 28.6* 28.4*    202     Recent Labs     05/23/22  0420 05/22/22  0400 05/21/22  0515 05/21/22  0500   *  147* 147*  --  148*   K 4.1  4.1 3.7  --  3.4*   *  120* 119*  --  117*   CO2 18*  18* 22  --  22   BUN 49*  48* 43*  --  48*   CREA 1.09*  1.05* 1.09*  --  1.16*   *  240* 219*  --  232*   CA 8.0*  7.9* 8.0*  --  8.1*   MG 2.4  --  2.5*  --    PHOS 3.4  3.4  --   --   --      Recent Labs     05/23/22  0420 05/22/22  0400 05/21/22  0500   * 833* 1,313*   AP 98 106 114   TBILI 0.5 0.5 0.5   TP 5.9* 5.8* 6.1*   ALB 1.5*  1.5* 1.6* 1.7*   GLOB 4.4* 4.2* 4.4*     Recent Labs     05/23/22  0620 05/22/22  0400 05/21/22  0500   INR 1.5* 1.7* 1.6*   PTP 18.7* 19.8* 19.3*      No results for input(s): FE, TIBC, PSAT, FERR in the last 72 hours.    No results found for: FOL, RBCF   Recent Labs     05/23/22  0415 05/22/22  0332   PH 7.37 7.41   PCO2 29* 28*   PO2 100 85     No results for input(s): CPK, CKNDX, TROIQ in the last 72 hours.     No lab exists for component: CPKMB  Lab Results   Component Value Date/Time    Cholesterol, total 124 03/08/2022 07:40 AM    HDL Cholesterol 30 03/08/2022 07:40 AM    LDL,Direct 79 06/28/2021 12:00 AM    LDL, calculated 44.8 03/08/2022 07:40 AM    Triglyceride 202 (H) 05/16/2022 06:20 AM    CHOL/HDL Ratio 4.1 03/08/2022 07:40 AM     Lab Results   Component Value Date/Time    Glucose (POC) 212 (H) 05/23/2022 07:59 AM    Glucose (POC) 227 (H) 05/23/2022 05:58 AM    Glucose (POC) 225 (H) 05/22/2022 11:09 PM    Glucose (POC) 262 (H) 05/22/2022 04:55 PM    Glucose (POC) 214 (H) 05/22/2022 11:24 AM     Lab Results   Component Value Date/Time    Color Yellow/Straw 05/14/2022 08:08 PM    Appearance Clear 05/14/2022 08:08 PM    Specific gravity 1.014 05/14/2022 08:08 PM    pH (UA) 6.0 05/14/2022 08:08 PM    Protein 30 (A) 05/14/2022 08:08 PM    Glucose 50 (A) 05/14/2022 08:08 PM    Ketone Negative 05/14/2022 08:08 PM    Bilirubin Negative 05/14/2022 08:08 PM    Urobilinogen 0.1 05/14/2022 08:08 PM    Nitrites Negative 05/14/2022 08:08 PM    Leukocyte Esterase Trace (A) 05/14/2022 08:08 PM    Bacteria Negative 05/14/2022 08:08 PM    Bacteria Rare (A) 05/14/2022 08:08 PM    WBC 0-4 05/14/2022 08:08 PM    WBC 4 05/14/2022 08:08 PM    RBC 0-5 05/14/2022 08:08 PM    RBC 1 05/14/2022 08:08 PM         Assessment:   Acute respiratory failure on ventilator 30% O2  Sacral decubitus ulcer with recent culture growing Pseudomonas rx with  Zosyn and meropenem  Sepsis with leukocytosis elevated procalcitonin and CRP  Left foot wound  Recent removal of left great toe and second toe  CVA with PEG tube placement  History of aspiration pneumonia  History of chronic kidney disease  CAD with ejection fraction about 20 to 25%  Hypertension  Hyperlipidemia  Anemia of chronic disease  Hyperkalemia  Static post transfusion  Uncontrolled diabetes  Hepatic shock/improving  Coagulopathy  Elevated  troponin      Plan:     Unasyn 3 g every 8 hours  Aspirin 81 mg daily  Questran 4 g twice a day  Pepcid 20 twice daily  Ferrous sulfate 325 mg twice a day  Gabapentin 300 mg twice a day  Hydralazine 50 mg 3 times a day  Metoprolol 25 daily  Replace potassium  Entresto 1 tablet twice a day  Vancomycin with pharmacy protocol  Add Lantus 10 units in the evening    GI consult for hepatic shock  Hematology consult for coagulopathy      Monitor liver function/INR  Static post transfusion    And daughter upset   By the vascular surgeon ordered PT/INR if INR less than 1.5 plan for laparoscopic loop colostomy      Vascular surgery planned for laparoscopic loop colostomy placement and sacral debridement then wound vacuum  Also try to close the wound on the left foot with TMA    Overall prognosis very poor      Current Facility-Administered Medications:     vancomycin (VANCOCIN) 500 mg in 0.9% sodium chloride (MBP/ADV) 100 mL MBP, 500 mg, IntraVENous, ONCE, Monika Mullen MD, Last Rate: 100 mL/hr at 05/23/22 0955, 500 mg at 05/23/22 0955    [START ON 5/24/2022] Vancomycin random level to be drawn on 5/24/22 at 0400 am., , Other, ONCE, Monika Mullen MD    insulin lispro (HUMALOG) injection, , SubCUTAneous, Q6H, Nathan Soto MD    glucose chewable tablet 16 g, 4 Tablet, Oral, PRN, Nathan Soto MD    glucagon (GLUCAGEN) injection 1 mg, 1 mg, IntraMUSCular, PRN, Nathan Soto MD    dextrose 10% infusion 0-250 mL, 0-250 mL, IntraVENous, PRN, Nathan Soto MD    hydrALAZINE (APRESOLINE) tablet 25 mg, 25 mg, Oral, TID, Ken, Terese E, NP, 25 mg at 05/22/22 2100    sacubitriL-valsartan (ENTRESTO) 24-26 mg tablet 1 Tablet, 1 Tablet, Oral, Q12H, Ken Terese E, NP, 1 Tablet at 05/23/22 0803    metoprolol succinate (TOPROL-XL) XL tablet 25 mg, 25 mg, Oral, DAILY, Ken, Terese E, NP, 25 mg at 05/22/22 0817    ampicillin-sulbactam (UNASYN) 3 g in 0.9% sodium chloride (MBP/ADV) 100 mL MBP, 3 g, IntraVENous, Q8H, Reid Robert MD, Last Rate: 200 mL/hr at 05/23/22 0601, 3 g at 05/23/22 0601    dextrose 10% infusion 0-250 mL, 0-250 mL, IntraVENous, PRN, Israel Soto MD, 125 mL at 05/19/22 0537    insulin glargine (LANTUS) injection 10 Units, 10 Units, SubCUTAneous, QHS, Israel Soto MD, 10 Units at 05/22/22 2313    NOREPINephrine (LEVOPHED) 32,000 mcg in dextrose 5% 250 mL (128 mcg/mL) infusion, 2 mcg/min, IntraVENous, TITRATE, Talha Fields MD    propofol (DIPRIVAN) 10 mg/mL infusion, 0-50 mcg/kg/min, IntraVENous, TITRATE, Subhash Roca MD, Last Rate: 11.9 mL/hr at 05/23/22 0918, 25 mcg/kg/min at 05/23/22 0918    0.9% sodium chloride infusion 250 mL, 250 mL, IntraVENous, PRN, Israel Soto MD, Last Rate: 15 mL/hr at 05/22/22 0820, Rate Verify at 05/22/22 0820    sodium hypochlorite (HALF STRENGTH DAKIN'S) 0.25% irrigation (bottle), , Topical, BID, Israel Soto MD, Given at 05/23/22 0806    Vancomycin Pharmacy Dosing, , Other, Rx Dosing/Monitoring, Severa Hatch,     sodium chloride (NS) flush 5-40 mL, 5-40 mL, IntraVENous, Q8H, Orestes Norman MD, 10 mL at 05/23/22 0601    acetaminophen (TYLENOL) tablet 650 mg, 650 mg, Oral, Q6H PRN, 650 mg at 05/22/22 0556 **OR** acetaminophen (TYLENOL) suppository 650 mg, 650 mg, Rectal, Q6H PRN, Zion JACKSON MD, 650 mg at 05/16/22 2223    polyethylene glycol (MIRALAX) packet 17 g, 17 g, Oral, DAILY PRN, Zion JACKSON MD    ondansetron (ZOFRAN ODT) tablet 4 mg, 4 mg, Oral, Q8H PRN **OR** ondansetron (ZOFRAN) injection 4 mg, 4 mg, IntraVENous, Q6H PRN, Zion JACKSON MD    [Held by provider] enoxaparin (LOVENOX) injection 40 mg, 40 mg, SubCUTAneous, DAILY, Orestes Norman MD, 40 mg at 05/17/22 1006    famotidine (PEPCID) tablet 20 mg, 20 mg, Oral, BID, Orestes Norman MD, 20 mg at 05/23/22 0803    acidophilus-pectin, citrus tablet 2 Tablet, 2 Tablet, Oral, DAILY, Jony Chappell MD, 2 Tablet at 05/23/22 0803    albuterol-ipratropium (DUO-NEB) 2.5 MG-0.5 MG/3 ML, 3 mL, Nebulization, Q6H PRN, Lowry Hodgkins I, MD    artificial saliva (MOUTH KOTE) 1 Spray, 1 Spray, Oral, TID, Lowry Hodgkins I, MD, 1 [place] at 05/23/22 0807    aspirin delayed-release tablet 81 mg, 81 mg, Oral, DAILY, Orestes Norman MD, 81 mg at 05/23/22 0804    brimonidine (ALPHAGAN) 0.2 % ophthalmic solution 1 Drop, 1 Drop, Both Eyes, TID, Orestes Norman MD, 1 Drop at 05/23/22 0807    cholestyramine-aspartame (QUESTRAN LIGHT) packet 4 g, 4 g, Oral, BID WITH MEALS, Orestes Norman MD, 4 g at 05/23/22 0803    [Held by provider] fenofibrate nanocrystallized (TRICOR) tablet 48 mg, 48 mg, Oral, DAILY, Orestes Norman MD, 48 mg at 05/19/22 2468    ferrous sulfate tablet 325 mg, 325 mg, Oral, BID, Orestes Norman MD, 325 mg at 05/23/22 0803    gabapentin (NEURONTIN) capsule 300 mg, 300 mg, Oral, BID, Orestes Norman MD, 300 mg at 05/23/22 0803    insulin glargine (LANTUS) injection 50 Units, 50 Units, SubCUTAneous, DAILY, Orestes Norman MD, 50 Units at 05/22/22 0817    latanoprost (XALATAN) 0.005 % ophthalmic solution 1 Drop, 1 Drop, Right Eye, QPM, Lowry Hodgkins I, MD, 1 Drop at 05/22/22 1720    traMADoL (ULTRAM) tablet 25 mg, 25 mg, Oral, Q12H PRN, Jony Chappell MD, 25 mg at 05/18/22 1796

## 2022-05-23 NOTE — PROGRESS NOTES
CARDIOLOGY PROGRESS NOTE - NP     Patient seen and examined. This is a patient who is followed for new onset cardiomyopathy and elevated troponin following rapid decompensation prior to loop colostomy, sacral wound debridement, and amputation of left transmetatarsal.  Remains intubated/sedated. Possible plans for procedures above tomorrow with Dr. Clifton Dykes should INR be less than 1.5. Daughter at bedside. Telemetry reviewed.  Remains in NSR. No further NSVT in the last 48 hours.     Unable to obtain ROS as is intubated/sedated. Current medications reviewed.     Physical Examination  Vital signs are stable. Blood pressure 110/55, Pulse 63  Intubated/sedated  Heart is regular, rate and rhythm. Normal S1, S2, no murmurs are appreciated. Lungs are clear but diminished bilaterally. Abdomen is soft, nontender, normal bowel sounds. Extremities have mild edema.     Labs reviewed: INR 1.5 today.     Case discussed with Dr. Brothers and our impression and recommendations are as follows:  1. New onset cardiomyopathy:  Echo 5/18/22 showing EF of 20-25% down from 50-55% in 4/2022.  Continue Tier 1 Entresto and will further wean hydralazine to 12.5mg TID to allow for titration of Entresto. Volume status is relatively stable.    2. Elevated troponin:  Peaked at 890 on the day of deterioration requiring intubation/sedation.  No asa presently as required transfusion for Hgb 6.8 5/20 with blood per rectum with dislodgement of Flexi-Seal.  Continue beta blocker.  Ischemic evaluation pending clinical course as remains critical presently.  No statin given LFTs elevated likely due to shock. Watch trend start when able   3. NSVT: None in the last 48 hours.  Continue beta blocker. Maintain lytes. 4.   Sepsis:  Per primary/ID. Results of echo, advancement of GDMT, etc. Discussed with pt's daughter in detail at bedside. All of her questions were answered.

## 2022-05-24 NOTE — PROGRESS NOTES
Progress Note    Patient: Malinda Rosado MRN: [id number]  SSN: [id number]    YOB: 1945  Age: 78 y.o. Sex: female      Admit Date: 5/14/2022    LOS: 10 days     Subjective:   Patient followed for sacral wound infection with repeat culture growing Acinetobacter and Enterococci. Left foot wound also grew Acinetobacter. Yesterday she was taken to the OR where she underwent left TMA and excisional wound debridement of the sacrum. WBC procal and CRP remain elevated. She is currently on Vancomycin and Unasyn. Objective:     Vitals:    05/24/22 0900 05/24/22 1000 05/24/22 1100 05/24/22 1117   BP: 100/86 (!) 108/45     Pulse: 62 (!) 59  (!) 57   Resp: 12 13  12   Temp:   96.9 °F (36.1 °C)    SpO2: 100% 100%  100%   Weight:       Height:            Intake and Output:  Current Shift: 05/24 0701 - 05/24 1900  In: 723.4 [I.V.:313.4]  Out: -   Last three shifts: 05/22 1901 - 05/24 0700  In: 3891.9 [I.V.:2461.9]  Out: 1500 [Urine:1500]    Physical Exam:    Vitals and nursing note reviewed. Constitutional:       General: She is not in acute distress. Appearance: She is ill-appearing. HENT: ET Tube  Eyes: closed   Cardiovascular:      Rate and Rhythm: Normal rate and regular rhythm. Heart sounds: No murmur heard. Pulmonary: diffuse rhonchi     Effort: Pulmonary effort is normal.      Breath sounds: Normal breath sounds. Abdominal:      General: Bowel sounds are normal.      Palpations: Abdomen is soft. Tenderness: There is no abdominal tenderness. Comments: PEG site unremarkable   Genitourinary:     Comments: Indwelling Bell with clear urine             Musculoskeletal:      Cervical back: Neck supple. Right lower leg: No edema. Left lower leg: No edema. Comments: Left foot TMA with bulky gauze dresing   Skin:     Findings: No rash.              Comments: Stage 3 sacral wound   Neurological: intubated   Psychiatric:  intubated      Lab/Data Review:     WBC 15,200  /35    Procal 0.71 <1.02 <1.72 <2.32 <0.59 <0.57  CRP 13.60 < 13.90 <17.10 <18.00 <18.90 <17.90    Blood cultures (5/14) Coagulase negative Staphylococci  Sacral wound (5/14) Acinetobacter baumannii sensitive to Unasyn, Cefepime, Ceftazidime and Enterococcus faecium  Sacral wound (5/23) Pending  Left great toe (5/15) Acinetobacter baumannii  Sputum culture (5/19) Rare normal respiratory yasmine FINAL  Assessment:     Active Problems:    Sacral ulcer ([address] (5/14/2022)    1. Sacral wound infection secondary now to Acinetobacter baumannii and Enterococcus faecium, on Vancomycin and Unasyn, status post excisional wound debridement to the bone. 2. Sepsis with persistent leukocytosis, elevated procal and CRP  3. Left foot wound, culture growing Acinetobacter baumannii, status post TMA  4. Possible ischemic hepatitiis with transaminitis following hypotensive episode, resolving  5. Positive blood cultures for Coagulase negative Staphylococci, probable contaminant  6. Hepatitis C antibody positive, resolved (negative HCV viral level)    Comment:  WBC fluctuating but procal and CRP decreasing. Sputum grew normal yasmine. Plan:   1. Continue IV Unasyn and Vancomycin (for E. Faecium)  2.  In am, repeat CBC, CRP, procal      Signed By: Merline Cue, MD     May 24, 2022

## 2022-05-24 NOTE — PROGRESS NOTES
General Daily Progress Note          Patient Name:   Nivia Dalal       YOB: 1945       Age:  78 y.o. 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 Cipro    Admitted again yesterday concerning for worsening of sacral ulcer    During last admission patient was seen by infectious disease and also seen by the vascular surgeon patient had debridement of wound during the last admission    Patient seen by the infectious disease yesterday    Sacral wound infection recent cultures growing Pseudomonas resistant to Zosyn and meropenem    5/17    Patient alert awake not in distress  Patient was seen by the vascular surgeon    Vascular surgery planned for laparoscopic loop colostomy placement and sacral debridement then wound vacuum  Also try to close the wound on the left foot with TMA    5/18    Resting in the bed not in any distress  Blood sugars running high    Seen by infectious disease continue vancomycin and start on Unasyn    5/19    And went to the OR intubated because of elevated LFT and elevated INR  Surgery was canceled    On ventilator 40% O2  Propofol drip    5/20    Patient on ventilator with 30% O2  On propofol drip    Hemoglobin dropped to 6.8    Patient's daughter at the bedside    5/20    Patient on ventilator 30% O2  On propofol drip  Getting PEG tube feeding    5/21    Patient still on ventilator 30% O2  On propofol drip  Liver Function improving    5/22  On ventilator with 30% O2 on propofol drip    5/24  Awaiting tissue culture results.    On ventilator with 30% O2 on propofol drip  Left transmetatarsal amputation and Sacral wound excisional wound debridement 13 x 15 cm to the bone completed yesterday. CXR: Hazy mid and lower lung reticular markings.  Dependent small to moderate volume,  right greater than left pleural effusions  Remarkable labs   WBC: 15.2   Hgb: 8.5   Na; 148  CRP: 13.60   Procal: 0.71    Objective:     Visit Vitals  BP (!) 108/45   Pulse (!) 57   Temp 96.9 °F (36.1 °C)   Resp 12   Ht 5' 7.01\" (1.702 m)   Wt 196 lb 3.4 oz (89 kg)   SpO2 100%   Breastfeeding No   BMI 30.72 kg/m²        Recent Results (from the past 24 hour(s))   GLUCOSE, POC    Collection Time: 05/23/22  2:55 PM   Result Value Ref Range    Glucose (POC) 142 (H) 65 - 117 mg/dL    Performed by Shimon Nicole    PROTHROMBIN TIME + INR    Collection Time: 05/23/22  4:15 PM   Result Value Ref Range    Prothrombin time 18.6 (H) 11.9 - 14.6 sec    INR 1.5 (H) 0.9 - 1.1     BLOOD GAS, ARTERIAL    Collection Time: 05/23/22  4:15 PM   Result Value Ref Range    pH 7.34 (L) 7.35 - 7.45      PCO2 31 (L) 35 - 45 mmHg    PO2 91 75 - 100 mmHg    O2 SAT 98 >95 %    BICARBONATE 18 (L) 22 - 26 mmol/L    BASE DEFICIT 8.0 (H) 0 - 2 mmol/L    O2 METHOD VENT      FIO2 30 %    MODE Assist Control/Volume Control      Tidal volume 500      SET RATE 12      EPAP/CPAP/PEEP 5      SITE Arterial Line     CALCIUM, IONIZED    Collection Time: 05/23/22  4:15 PM   Result Value Ref Range    Calcium, ionized 1.24 mmol/L   POTASSIUM    Collection Time: 05/23/22  4:35 PM   Result Value Ref Range    Potassium 3.8 3.5 - 5.1 mmol/L   SODIUM    Collection Time: 05/23/22  4:35 PM   Result Value Ref Range    Sodium 149 (H) 136 - 145 mmol/L   CHLORIDE    Collection Time: 05/23/22  4:35 PM   Result Value Ref Range    Chloride 121 (H) 97 - 108 mmol/L   BLOOD GAS, ARTERIAL    Collection Time: 05/23/22  4:35 PM   Result Value Ref Range    pH 7.34 (L) 7.35 - 7.45      PCO2 31 (L) 35 - 45 mmHg    PO2 91 75 - 100 mmHg    O2 SAT 98 >95 %    BICARBONATE 18 (L) 22 - 26 mmol/L    BASE DEFICIT 8.0 (H) 0 - 2 mmol/L    O2 METHOD VENT      FIO2 30 %    MODE Assist Control/Volume Control      Tidal volume 500      SET RATE 12      Sample source Arterial      SITE Right Radial     HEMOGLOBIN    Collection Time: 05/23/22  4:35 PM   Result Value Ref Range    HGB 8.9 (L) 11.5 - 16.0 g/dL   PROTHROMBIN TIME + INR    Collection Time: 05/23/22  8:10 PM   Result Value Ref Range    Prothrombin time 19.4 (H) 11.9 - 14.6 sec    INR 1.6 (H) 0.9 - 1.1     METABOLIC PANEL, COMPREHENSIVE    Collection Time: 05/23/22  8:10 PM   Result Value Ref Range    Sodium 149 (H) 136 - 145 mmol/L    Potassium 3.8 3.5 - 5.1 mmol/L    Chloride 123 (H) 97 - 108 mmol/L    CO2 21 21 - 32 mmol/L    Anion gap 5 5 - 15 mmol/L    Glucose 122 (H) 65 - 100 mg/dL    BUN 46 (H) 6 - 20 mg/dL    Creatinine 0.91 0.55 - 1.02 mg/dL    BUN/Creatinine ratio 51 (H) 12 - 20      GFR est AA >60 >60 ml/min/1.73m2    GFR est non-AA >60 >60 ml/min/1.73m2    Calcium 7.8 (L) 8.5 - 10.1 mg/dL    Bilirubin, total 0.5 0.2 - 1.0 mg/dL    AST (SGOT) 38 (H) 15 - 37 U/L    ALT (SGPT) 350 (H) 12 - 78 U/L    Alk.  phosphatase 80 45 - 117 U/L    Protein, total 5.2 (L) 6.4 - 8.2 g/dL    Albumin 1.3 (L) 3.5 - 5.0 g/dL    Globulin 3.9 2.0 - 4.0 g/dL    A-G Ratio 0.3 (L) 1.1 - 2.2     BLOOD GAS, ARTERIAL    Collection Time: 05/23/22 10:20 PM   Result Value Ref Range    pH 7.35 7.35 - 7.45      PCO2 30 (L) 35 - 45 mmHg    PO2 85 75 - 100 mmHg    O2 SAT 97 >95 %    BICARBONATE 18 (L) 22 - 26 mmol/L    BASE DEFICIT 8.5 (H) 0 - 2 mmol/L    O2 METHOD VENT      FIO2 30.0 %    MODE Assist Control/Volume Control      Tidal volume 500      SET RATE 12      IPAP/PIP 0      EPAP/CPAP/PEEP 5.0      SITE Left Brachial      EVELIN'S TEST PASS     GLUCOSE, POC    Collection Time: 05/23/22 11:24 PM   Result Value Ref Range    Glucose (POC) 82 65 - 117 mg/dL    Performed by Roger Carbone RN (Tvlr)    CBC WITH AUTOMATED DIFF    Collection Time: 05/24/22  4:00 AM   Result Value Ref Range WBC 15.2 (H) 3.6 - 11.0 K/uL    RBC 3.00 (L) 3.80 - 5.20 M/uL    HGB 8.5 (L) 11.5 - 16.0 g/dL    HCT 27.4 (L) 35.0 - 47.0 %    MCV 91.3 80.0 - 99.0 FL    MCH 28.3 26.0 - 34.0 PG    MCHC 31.0 30.0 - 36.5 g/dL    RDW 17.4 (H) 11.5 - 14.5 %    PLATELET 841 106 - 252 K/uL    MPV 11.4 8.9 - 12.9 FL    NRBC 0.3 (H) 0.0  WBC    ABSOLUTE NRBC 0.04 (H) 0.00 - 0.01 K/uL    NEUTROPHILS 78 (H) 32 - 75 %    LYMPHOCYTES 12 12 - 49 %    MONOCYTES 5 5 - 13 %    EOSINOPHILS 3 0 - 7 %    BASOPHILS 1 0 - 1 %    IMMATURE GRANULOCYTES 1 (H) 0 - 0.5 %    ABS. NEUTROPHILS 11.9 (H) 1.8 - 8.0 K/UL    ABS. LYMPHOCYTES 1.9 0.8 - 3.5 K/UL    ABS. MONOCYTES 0.8 0.0 - 1.0 K/UL    ABS. EOSINOPHILS 0.5 (H) 0.0 - 0.4 K/UL    ABS. BASOPHILS 0.1 0.0 - 0.1 K/UL    ABS. IMM. GRANS. 0.2 (H) 0.00 - 0.04 K/UL    DF AUTOMATED     METABOLIC PANEL, COMPREHENSIVE    Collection Time: 05/24/22  4:00 AM   Result Value Ref Range    Sodium 148 (H) 136 - 145 mmol/L    Potassium 3.7 3.5 - 5.1 mmol/L    Chloride 123 (H) 97 - 108 mmol/L    CO2 18 (L) 21 - 32 mmol/L    Anion gap 7 5 - 15 mmol/L    Glucose 142 (H) 65 - 100 mg/dL    BUN 45 (H) 6 - 20 mg/dL    Creatinine 0.91 0.55 - 1.02 mg/dL    BUN/Creatinine ratio 49 (H) 12 - 20      GFR est AA >60 >60 ml/min/1.73m2    GFR est non-AA >60 >60 ml/min/1.73m2    Calcium 8.1 (L) 8.5 - 10.1 mg/dL    Bilirubin, total 0.5 0.2 - 1.0 mg/dL    AST (SGOT) 35 15 - 37 U/L    ALT (SGPT) 325 (H) 12 - 78 U/L    Alk.  phosphatase 83 45 - 117 U/L    Protein, total 6.1 (L) 6.4 - 8.2 g/dL    Albumin 1.4 (L) 3.5 - 5.0 g/dL    Globulin 4.7 (H) 2.0 - 4.0 g/dL    A-G Ratio 0.3 (L) 1.1 - 2.2     PROTHROMBIN TIME + INR    Collection Time: 05/24/22  4:00 AM   Result Value Ref Range    Prothrombin time 19.4 (H) 11.9 - 14.6 sec    INR 1.6 (H) 0.9 - 1.1     PROCALCITONIN    Collection Time: 05/24/22  4:00 AM   Result Value Ref Range    Procalcitonin 0.71 (H) 0 ng/mL   C REACTIVE PROTEIN, QT    Collection Time: 05/24/22  4:00 AM   Result Value Ref Range    C-Reactive protein 13.60 (H) 0.00 - 0.60 mg/dL   GLUCOSE, POC    Collection Time: 05/24/22  5:36 AM   Result Value Ref Range    Glucose (POC) 154 (H) 65 - 117 mg/dL    Performed by [address] [place] [address], POC    Collection Time: 05/24/22 11:11 AM   Result Value Ref Range    Glucose (POC) 184 (H) 65 - 117 mg/dL    Performed by Samira Zapata      [unfilled]      Review of Systems    Not a good historian e. Physical Exam:      Constitutional: On ventilator  HENT:   Head: Normocephalic and atraumatic. Eyes: Pupils are equal, round, and reactive to light. EOM are normal.   Cardiovascular: Normal rate, regular rhythm and normal heart sounds. Pulmonary/Chest: Breath sounds normal. No wheezes. No rales. Exhibits no tenderness. Abdominal: Soft. Bowel sounds are normal. There is no abdominal tenderness. There is no rebound and no guarding. Musculoskeletal: Normal range of motion. Neurological: On ventilator   skin sacral decubitus ulcer and left foot wound right big toe amputation    XR CHEST PORT   Final Result   Similar findings compared to single view chest 1 day earlier. XR CHEST PORT   Final Result   Findings/IMPRESSION: Stable appearance of endotracheal tube. Stable mild   enlargement of cardiomediastinal silhouette. Central vascular congestion with   bilateral perihilar and basilar opacities, consistent with edema and/or   pneumonia, right greater than left. Possible right pleural effusion. No   pneumothorax. XR CHEST PORT   Final Result   Bibasilar opacities, possibly increased on the right. XR CHEST PORT   Final Result   No significant change allowing for difference in technique and   positioning. Single portable AP view compared to yesterday. Suboptimal inspiratory film   compromises visualization of the lower lung fields. Monitoring equipment   overlies the body. The tip of the tube is approximately 3 cm above the jennifer. Mild apparent cardiomegaly. Left heart border and left diaphragm obscured. Hazy airspace opacification both lung bases may be a combination of atelectasis,   pneumonia, or edema. No large effusion. Negative for pneumothorax. XR CHEST PORT   Final Result   No significant change allowing for difference in technique and   positioning. Single portable AP view compared to yesterday. Rotation to the right. Monitoring   equipment overlies the body. Suboptimal inspiratory film compromises   visualization of the lower lung fields. Mild cardiomegaly. The tip of the ET tube is approximately 3 cm above the jennifer. Mild basal interstitial edema. No new consolidation. No pleural effusion or   pneumothorax. XR CHEST PORT   Final Result   1. The endotracheal tube is in satisfactory position. 2.  There has been a partial interval resolution in the pulmonary interstitial   edema pattern. XR CHEST PORT   Final Result   Ill-defined haziness in the mid to lower lung zones suspect for mild   atelectatic changes and/or interstitial fluid or pleural fluid. US ABD LTD   Final Result   1. Question pericholecystic fluid. No identified gallstones or sludge. 2. Right pleural effusion. 3. Increased right renal echotexture for medical renal disease. XR CHEST PORT   Final Result   Intubation. Findings suggesting hydrostatic edema. XR CHEST PORT   Final Result   No evidence of an acute cardiopulmonary process.       CT ABD PELV WO CONT    (Results Pending)   XR CHEST PORT    (Results Pending)        Recent Results (from the past 24 hour(s))   GLUCOSE, POC    Collection Time: 05/23/22  2:55 PM   Result Value Ref Range    Glucose (POC) 142 (H) 65 - 117 mg/dL    Performed by Eleanora Sandhoff    PROTHROMBIN TIME + INR    Collection Time: 05/23/22  4:15 PM   Result Value Ref Range    Prothrombin time 18.6 (H) 11.9 - 14.6 sec    INR 1.5 (H) 0.9 - 1.1     BLOOD GAS, ARTERIAL    Collection Time: 05/23/22  4:15 PM   Result Value Ref Range    pH 7.34 (L) 7.35 - 7.45      PCO2 31 (L) 35 - 45 mmHg    PO2 91 75 - 100 mmHg    O2 SAT 98 >95 %    BICARBONATE 18 (L) 22 - 26 mmol/L    BASE DEFICIT 8.0 (H) 0 - 2 mmol/L    O2 METHOD VENT      FIO2 30 %    MODE Assist Control/Volume Control      Tidal volume 500      SET RATE 12      EPAP/CPAP/PEEP 5      SITE Arterial Line     CALCIUM, IONIZED    Collection Time: 05/23/22  4:15 PM   Result Value Ref Range    Calcium, ionized 1.24 mmol/L   POTASSIUM    Collection Time: 05/23/22  4:35 PM   Result Value Ref Range    Potassium 3.8 3.5 - 5.1 mmol/L   SODIUM    Collection Time: 05/23/22  4:35 PM   Result Value Ref Range    Sodium 149 (H) 136 - 145 mmol/L   CHLORIDE    Collection Time: 05/23/22  4:35 PM   Result Value Ref Range    Chloride 121 (H) 97 - 108 mmol/L   BLOOD GAS, ARTERIAL    Collection Time: 05/23/22  4:35 PM   Result Value Ref Range    pH 7.34 (L) 7.35 - 7.45      PCO2 31 (L) 35 - 45 mmHg    PO2 91 75 - 100 mmHg    O2 SAT 98 >95 %    BICARBONATE 18 (L) 22 - 26 mmol/L    BASE DEFICIT 8.0 (H) 0 - 2 mmol/L    O2 METHOD VENT      FIO2 30 %    MODE Assist Control/Volume Control      Tidal volume 500      SET RATE 12      Sample source Arterial      SITE Right Radial     HEMOGLOBIN    Collection Time: 05/23/22  4:35 PM   Result Value Ref Range    HGB 8.9 (L) 11.5 - 16.0 g/dL   PROTHROMBIN TIME + INR    Collection Time: 05/23/22  8:10 PM   Result Value Ref Range    Prothrombin time 19.4 (H) 11.9 - 14.6 sec    INR 1.6 (H) 0.9 - 1.1     METABOLIC PANEL, COMPREHENSIVE    Collection Time: 05/23/22  8:10 PM   Result Value Ref Range    Sodium 149 (H) 136 - 145 mmol/L    Potassium 3.8 3.5 - 5.1 mmol/L    Chloride 123 (H) 97 - 108 mmol/L    CO2 21 21 - 32 mmol/L    Anion gap 5 5 - 15 mmol/L    Glucose 122 (H) 65 - 100 mg/dL    BUN 46 (H) 6 - 20 mg/dL    Creatinine 0.91 0.55 - 1.02 mg/dL    BUN/Creatinine ratio 51 (H) 12 - 20      GFR est AA >60 >60 ml/min/1.73m2    GFR est non-AA >60 >60 ml/min/1.73m2    Calcium 7.8 (L) 8.5 - 10.1 mg/dL    Bilirubin, total 0.5 0.2 - 1.0 mg/dL    AST (SGOT) 38 (H) 15 - 37 U/L    ALT (SGPT) 350 (H) 12 - 78 U/L    Alk. phosphatase 80 45 - 117 U/L    Protein, total 5.2 (L) 6.4 - 8.2 g/dL    Albumin 1.3 (L) 3.5 - 5.0 g/dL    Globulin 3.9 2.0 - 4.0 g/dL    A-G Ratio 0.3 (L) 1.1 - 2.2     BLOOD GAS, ARTERIAL    Collection Time: 05/23/22 10:20 PM   Result Value Ref Range    pH 7.35 7.35 - 7.45      PCO2 30 (L) 35 - 45 mmHg    PO2 85 75 - 100 mmHg    O2 SAT 97 >95 %    BICARBONATE 18 (L) 22 - 26 mmol/L    BASE DEFICIT 8.5 (H) 0 - 2 mmol/L    O2 METHOD VENT      FIO2 30.0 %    MODE Assist Control/Volume Control      Tidal volume 500      SET RATE 12      IPAP/PIP 0      EPAP/CPAP/PEEP 5.0      SITE Left Brachial      EVELIN'S TEST PASS     GLUCOSE, POC    Collection Time: 05/23/22 11:24 PM   Result Value Ref Range    Glucose (POC) 82 65 - 117 mg/dL    Performed by Tl Felipe RN (Tv)    CBC WITH AUTOMATED DIFF    Collection Time: 05/24/22  4:00 AM   Result Value Ref Range    WBC 15.2 (H) 3.6 - 11.0 K/uL    RBC 3.00 (L) 3.80 - 5.20 M/uL    HGB 8.5 (L) 11.5 - 16.0 g/dL    HCT 27.4 (L) 35.0 - 47.0 %    MCV 91.3 80.0 - 99.0 FL    MCH 28.3 26.0 - 34.0 PG    MCHC 31.0 30.0 - 36.5 g/dL    RDW 17.4 (H) 11.5 - 14.5 %    PLATELET 810 028 - 837 K/uL    MPV 11.4 8.9 - 12.9 FL    NRBC 0.3 (H) 0.0  WBC    ABSOLUTE NRBC 0.04 (H) 0.00 - 0.01 K/uL    NEUTROPHILS 78 (H) 32 - 75 %    LYMPHOCYTES 12 12 - 49 %    MONOCYTES 5 5 - 13 %    EOSINOPHILS 3 0 - 7 %    BASOPHILS 1 0 - 1 %    IMMATURE GRANULOCYTES 1 (H) 0 - 0.5 %    ABS. NEUTROPHILS 11.9 (H) 1.8 - 8.0 K/UL    ABS. LYMPHOCYTES 1.9 0.8 - 3.5 K/UL    ABS. MONOCYTES 0.8 0.0 - 1.0 K/UL    ABS. EOSINOPHILS 0.5 (H) 0.0 - 0.4 K/UL    ABS. BASOPHILS 0.1 0.0 - 0.1 K/UL    ABS. IMM.  GRANS. 0.2 (H) 0.00 - 0.04 K/UL    DF AUTOMATED     METABOLIC PANEL, COMPREHENSIVE    Collection Time: 05/24/22  4:00 AM   Result Value Ref Range    Sodium 148 (H) 136 - 145 mmol/L Potassium 3.7 3.5 - 5.1 mmol/L    Chloride 123 (H) 97 - 108 mmol/L    CO2 18 (L) 21 - 32 mmol/L    Anion gap 7 5 - 15 mmol/L    Glucose 142 (H) 65 - 100 mg/dL    BUN 45 (H) 6 - 20 mg/dL    Creatinine 0.91 0.55 - 1.02 mg/dL    BUN/Creatinine ratio 49 (H) 12 - 20      GFR est AA >60 >60 ml/min/1.73m2    GFR est non-AA >60 >60 ml/min/1.73m2    Calcium 8.1 (L) 8.5 - 10.1 mg/dL    Bilirubin, total 0.5 0.2 - 1.0 mg/dL    AST (SGOT) 35 15 - 37 U/L    ALT (SGPT) 325 (H) 12 - 78 U/L    Alk.  phosphatase 83 45 - 117 U/L    Protein, total 6.1 (L) 6.4 - 8.2 g/dL    Albumin 1.4 (L) 3.5 - 5.0 g/dL    Globulin 4.7 (H) 2.0 - 4.0 g/dL    A-G Ratio 0.3 (L) 1.1 - 2.2     PROTHROMBIN TIME + INR    Collection Time: 05/24/22  4:00 AM   Result Value Ref Range    Prothrombin time 19.4 (H) 11.9 - 14.6 sec    INR 1.6 (H) 0.9 - 1.1     PROCALCITONIN    Collection Time: 05/24/22  4:00 AM   Result Value Ref Range    Procalcitonin 0.71 (H) 0 ng/mL   C REACTIVE PROTEIN, QT    Collection Time: 05/24/22  4:00 AM   Result Value Ref Range    C-Reactive protein 13.60 (H) 0.00 - 0.60 mg/dL   GLUCOSE, POC    Collection Time: 05/24/22  5:36 AM   Result Value Ref Range    Glucose (POC) 154 (H) 65 - 117 mg/dL    Performed by Flaco Healy    GLUCOSE, POC    Collection Time: 05/24/22 11:11 AM   Result Value Ref Range    Glucose (POC) 184 (H) 65 - 117 mg/dL    Performed by Caffie Sicard        Results     Procedure Component Value Units Date/Time    CULTURE, TISSUE Wen Goff STAIN [[id number]] Collected: 05/23/22 1815    Order Status: Completed Specimen: Tissue Updated: 05/24/22 1014    CULTURE, RESPIRATORY/SPUTUM/BRONCH Wen Goff STAIN [[id number]] Collected: 05/19/22 1448    Order Status: Completed Specimen: Sputum Updated: 05/21/22 0813     Special Requests: No Special Requests        GRAM STAIN 1+ WBCs seen         no epithelial cells seen         No organisms seen        Culture result:       Rare Normal respiratory yasmine          CULTURE, WOUND Wen Goff STAIN [[id number]]  (Susceptibility) Collected: 05/15/22 1915    Order Status: Completed Specimen: Wound from Great Toe Updated: 05/18/22 0835     Special Requests: No Special Requests        GRAM STAIN       3+ Gram Positive Rods (Coryneform)                  1+ apparent Gram Negative Rods           Culture result:       Heavy Acinetobacter baumannii complex            Heavy Diphtheroids       Susceptibility      Acinetobacter baumannii complex     GEMA     Ampicillin/sulbactam ($) Susceptible     Cefepime ($$) Susceptible     Ceftazidime ($) Susceptible     Ceftriaxone ($) Intermediate     Ciprofloxacin ($) Resistant     Gentamicin ($) Susceptible     Levofloxacin ($) Resistant     Meropenem ($$) Resistant     Piperacillin/Tazobac ($) Resistant     Tobramycin ($) Susceptible     Trimeth/Sulfa Susceptible                  Linear View                   CULTURE, Castillo Guthrie STAIN [[id number]]  (Susceptibility) Collected: 05/14/22 1630    Order Status: Completed Specimen: Wound from Ulcer Updated: 05/18/22 0841     Special Requests: No Special Requests        GRAM STAIN Few WBCs seen         2+ Gram Negative Rods               Occasional Gram Positive Cocci in pairs            Rare Gram Positive Rods        Culture result: Moderate Acinetobacter baumannii complex                  Moderate Enterococcus faecium                  Light >2 organisms - contaminated specimen.  suggest recollection Anaerobic gram negative rods Beta Lactamase Positive          Susceptibility      Acinetobacter baumannii complex     GEMA     Ampicillin/sulbactam ($) Susceptible     Cefepime ($$) Susceptible     Ceftazidime ($) Susceptible     Ceftriaxone ($) Intermediate     Ciprofloxacin ($) Resistant     Gentamicin ($) Susceptible     Levofloxacin ($) Resistant     Meropenem ($$) Resistant     Piperacillin/Tazobac ($) Resistant     Tobramycin ($) Susceptible     Trimeth/Sulfa Susceptible                  Linear View Susceptibility      Enterococcus faecium     GEMA     Ampicillin ($) Resistant     Daptomycin ($$$$$)  See below  [1]      Linezolid ($$$$$) Susceptible     Vancomycin ($) Susceptible                 [1]  Dose Dependent  (SENSITIVITIES PERFORMED BY E-TEST)          Linear View                   CULTURE, BLOOD, PAIRED [[id number]]  (Abnormal) Collected: 05/14/22 2000    Order Status: Completed Specimen: Blood Updated: 05/17/22 1212     Special Requests: No Special Requests        Culture result:       Staphylococcus species, coagulase negative GROWING IN THE ANAEROBIC BOTTLE. No Site Indicated            (NOTE) PRELIMINARY REPORT OF GRAM POSITIVE COCCI IN CLUSTERS GROWING IN THE ANAEROBIC BOTTLE, CALLED TO AND READ BACK BY CANDI KNOWLES 5/16/22 1118 [facility]. Labs:     Recent Labs     05/24/22 0400 05/23/22 1635 05/23/22 0420 05/23/22 0420   WBC 15.2*  --   --  17.2*   HGB 8.5* 8.9*   < > 9.0*   HCT 27.4*  --   --  28.6*     --   --  192    < > = values in this interval not displayed. Recent Labs     05/24/22 0400 05/23/22 2010 05/23/22 1635 05/23/22 0420 05/23/22 0420   * 149* 149*   < > 147*  147*   K 3.7 3.8 3.8   < > 4.1  4.1   * 123* 121*   < > 120*  120*   CO2 18* 21  --   --  18*  18*   BUN 45* 46*  --   --  49*  48*   CREA 0.91 0.91  --   --  1.09*  1.05*   * 122*  --   --  238*  240*   CA 8.1* 7.8*  --   --  8.0*  7.9*   MG  --   --   --   --  2.4   PHOS  --   --   --   --  3.4  3.4    < > = values in this interval not displayed. Recent Labs     05/24/22 0400 05/23/22 2010 05/23/22 0420   * 350* 525*   AP 83 80 98   TBILI 0.5 0.5 0.5   TP 6.1* 5.2* 5.9*   ALB 1.4* 1.3* 1.5*  1.5*   GLOB 4.7* 3.9 4.4*     Recent Labs     05/24/22  0400 05/23/22 2010 05/23/22  1615   INR 1.6* 1.6* 1.5*   PTP 19.4* 19.4* 18.6*      No results for input(s): FE, TIBC, PSAT, FERR in the last 72 hours.    No results found for: FOL, RBCF   Recent Labs 05/23/22  2220 05/23/22  1635   PH 7.35 7.34*   PCO2 30* 31*   PO2 85 91     No results for input(s): CPK, CKNDX, TROIQ in the last 72 hours.     No lab exists for component: CPKMB  Lab Results   Component Value Date/Time    Cholesterol, total 124 03/08/2022 07:40 AM    HDL Cholesterol 30 03/08/2022 07:40 AM    LDL,Direct 79 06/28/2021 12:00 AM    LDL, calculated 44.8 03/08/2022 07:40 AM    Triglyceride 202 (H) 05/16/2022 06:20 AM    CHOL/HDL Ratio 4.1 03/08/2022 07:40 AM     Lab Results   Component Value Date/Time    Glucose (POC) 184 (H) 05/24/2022 11:11 AM    Glucose (POC) 154 (H) 05/24/2022 05:36 AM    Glucose (POC) 82 05/23/2022 11:24 PM    Glucose (POC) 142 (H) 05/23/2022 02:55 PM    Glucose (POC) 191 (H) 05/23/2022 10:57 AM     Lab Results   Component Value Date/Time    Color Yellow/Straw 05/14/2022 08:08 PM    Appearance Clear 05/14/2022 08:08 PM    Specific gravity 1.014 05/14/2022 08:08 PM    pH (UA) 6.0 05/14/2022 08:08 PM    Protein 30 (A) 05/14/2022 08:08 PM    Glucose 50 (A) 05/14/2022 08:08 PM    Ketone Negative 05/14/2022 08:08 PM    Bilirubin Negative 05/14/2022 08:08 PM    Urobilinogen 0.1 05/14/2022 08:08 PM    Nitrites Negative 05/14/2022 08:08 PM    Leukocyte Esterase Trace (A) 05/14/2022 08:08 PM    Bacteria Negative 05/14/2022 08:08 PM    Bacteria Rare (A) 05/14/2022 08:08 PM    WBC 0-4 05/14/2022 08:08 PM    WBC 4 05/14/2022 08:08 PM    RBC 0-5 05/14/2022 08:08 PM    RBC 1 05/14/2022 08:08 PM         Assessment:   Acute respiratory failure on ventilator 30% O2  Sacral decubitus ulcer with recent culture growing Pseudomonas rx with  Zosyn and meropenem  Sepsis with leukocytosis elevated procalcitonin and CRP  Left foot wound  Recent removal of left great toe and second toe  CVA with PEG tube placement  History of aspiration pneumonia  History of chronic kidney disease  CAD with ejection fraction about 20 to 25%  Hypertension  Hyperlipidemia  Anemia of chronic disease  Hyperkalemia  Static post transfusion  Uncontrolled diabetes  Hepatic shock/improving  Coagulopathy  Elevated  troponin      Plan:     Unasyn 3 g every 8 hours  Aspirin 81 mg daily  Questran 4 g twice a day  Pepcid 20 twice daily  Ferrous sulfate 325 mg twice a day  Gabapentin 300 mg twice a day  Hydralazine 50 mg 3 times a day  Metoprolol 25 daily  Replace potassium  Entresto 1 tablet twice a day  Vancomycin with pharmacy protocol  Add Lantus 10 units in the evening    GI consult for hepatic shock  Hematology consult for coagulopathy      Monitor liver function/INR  Static post transfusion    And daughter upset   By the vascular surgeon ordered PT/INR if INR less than 1.5 plan for laparoscopic loop colostomy    Overall prognosis very poor    Per Nephrology   Will change IV normal saline to half-normal saline to increase her free water intake.   Resuming free water with the PEG feeding will help in improving the serum sodium.       Current Facility-Administered Medications:     sodium chloride (NS) flush 5-40 mL, 5-40 mL, IntraVENous, Q8H, Bert Bob MD, 10 mL at 05/24/22 0808    sodium chloride (NS) flush 5-40 mL, 5-40 mL, IntraVENous, PRN, Bert Bob MD    ondansetron (ZOFRAN ODT) tablet 4 mg, 4 mg, Oral, Q8H PRN **OR** ondansetron (ZOFRAN) injection 4 mg, 4 mg, IntraVENous, Q6H PRN, Bert Bob MD    enoxaparin (LOVENOX) injection 40 mg, 40 mg, SubCUTAneous, DAILY, Bert Bob MD, 40 mg at 05/24/22 1002    insulin lispro (HUMALOG) injection, , SubCUTAneous, Q6H, Bert Bob MD, 1 Units at 05/24/22 0543    glucose chewable tablet 16 g, 4 Tablet, Oral, PRN, Bert Bob MD    glucagon [place] [facility]) injection 1 mg, 1 mg, IntraMUSCular, PRN, Bert Bob MD    dextrose 10% infusion 0-250 mL, 0-250 mL, IntraVENous, PRN, Bert Bob MD    [START ON 5/25/2022] Vancomycin random level to be drawn on 5/25/22 at 0400 am., , Other, Ron Pablo MD    0.45% sodium chloride infusion, 50 mL/hr, IntraVENous, CONTINUOUS, Colette Bob MD, Last Rate: 50 mL/hr at 05/24/22 0809, 50 mL/hr at 05/24/22 0809    hydrALAZINE (APRESOLINE) tablet 12.5 mg, 12.5 mg, Oral, TID, Colette Bob MD, 12.5 mg at 05/24/22 9720    sacubitriL-valsartan (ENTRESTO) 24-26 mg tablet 1 Tablet, 1 Tablet, Oral, Q12H, Colette Bob MD, 1 Tablet at 05/24/22 8465    metoprolol succinate (TOPROL-XL) XL tablet 25 mg, 25 mg, Oral, DAILY, Colette Bob MD, 25 mg at 05/24/22 0806    ampicillin-sulbactam (UNASYN) 3 g in 0.9% sodium chloride (MBP/ADV) 100 mL MBP, 3 g, IntraVENous, Q8H, Colette Bob MD, Last Rate: 200 mL/hr at 05/24/22 0543, 3 g at 05/24/22 0543    dextrose 10% infusion 0-250 mL, 0-250 mL, IntraVENous, PRN, Colette Bob MD, 125 mL at 05/19/22 0537    insulin glargine (LANTUS) injection 10 Units, 10 Units, SubCUTAneous, QHS, Colette Bob MD, 10 Units at 05/23/22 2200    NOREPINephrine (LEVOPHED) 32,000 mcg in dextrose 5% 250 mL (128 mcg/mL) infusion, 2 mcg/min, IntraVENous, TITRATE, Colette Bob MD    propofol (DIPRIVAN) 10 mg/mL infusion, 0-50 mcg/kg/min, IntraVENous, TITRATE, Colette Bob MD, Last Rate: 14.3 mL/hr at 05/24/22 0805, 30 mcg/kg/min at 05/24/22 0805    0.9% sodium chloride infusion 250 mL, 250 mL, IntraVENous, PRN, Colette Bob MD, Last Rate: 15 mL/hr at 05/22/22 0820, Rate Verify at 05/22/22 0820    sodium hypochlorite (HALF STRENGTH DAKIN'S) 0.25% irrigation (bottle), , Topical, BID, Colette Bob MD, Given at 05/24/22 6571    Vancomycin Pharmacy Dosing, , Other, Rx Dosing/Monitoring, Colette Bob MD    sodium chloride (NS) flush 5-40 mL, 5-40 mL, IntraVENous, Q8H, Colette Bob MD, 10 mL at 05/24/22 0600    acetaminophen (TYLENOL) tablet 650 mg, 650 mg, Oral, Q6H PRN, 650 mg at 05/22/22 0556 **OR** acetaminophen (TYLENOL) suppository 650 mg, 650 mg, Rectal, Q6H PRN, Colette Bob MD, 650 mg at 05/16/22 2223    polyethylene glycol (MIRALAX) packet 17 g, 17 g, Oral, DAILY PRN, Rell Bob MD    ondansetron [facility] ODT) tablet 4 mg, 4 mg, Oral, Q8H PRN **OR** ondansetron (ZOFRAN) injection 4 mg, 4 mg, IntraVENous, Q6H PRN, Jayant Ambriz MD  [facility] by provider] enoxaparin (LOVENOX) injection 40 mg, 40 mg, SubCUTAneous, DAILY, Rell Bob MD, 40 mg at 05/17/22 1006    famotidine (PEPCID) tablet 20 mg, 20 mg, Oral, BID, Rell Bob MD, 20 mg at 05/24/22 2618    acidophilus-pectin, citrus tablet 2 Tablet, 2 Tablet, Oral, DAILY, Rell Bob MD, 2 Tablet at 05/24/22 0806    albuterol-ipratropium (DUO-NEB) 2.5 MG-0.5 MG/3 ML, 3 mL, Nebulization, Q6H PRN, Rell Bob MD    artificial saliva (MOUTH KOTE) 1 Spray, 1 Spray, Oral, TID, Rell Bob MD, 1 [place] at 05/23/22 2129    aspirin delayed-release tablet 81 mg, 81 mg, Oral, DAILY, Rell Bob MD, 81 mg at 05/24/22 0806    brimonidine (ALPHAGAN) 0.2 % ophthalmic solution 1 Drop, 1 Drop, Both Eyes, TID, Rell Bob MD, 1 Drop at 05/24/22 0807    cholestyramine-aspartame (QUESTRAN LIGHT) packet 4 g, 4 g, Oral, BID WITH MEALS, Rell Bob MD, 4 g at 05/24/22 0956    [Held by provider] fenofibrate nanocrystallized (TRICOR) tablet 48 mg, 48 mg, Oral, DAILY, Rell Bob MD, 48 mg at 05/19/22 3181    ferrous sulfate tablet 325 mg, 325 mg, Oral, BID, Rell Bob MD, 325 mg at 05/24/22 9915    gabapentin (NEURONTIN) capsule 300 mg, 300 mg, Oral, BID, Rell Bob MD, 300 mg at 05/24/22 0806    insulin glargine (LANTUS) injection 50 Units, 50 Units, SubCUTAneous, DAILY, Rell Bob MD, 50 Units at 05/24/22 0806    latanoprost (XALATAN) 0.005 % ophthalmic solution 1 Drop, 1 Drop, Right Eye, QPM, Rell Bob MD, 1 Drop at 05/22/22 1720    traMADoL (ULTRAM) tablet 25 mg, 25 mg, Oral, Q12H PRN, Rell Bob MD, 25 mg at 05/18/22 3930

## 2022-05-24 NOTE — PROGRESS NOTES
General Daily Progress Note          Patient Name:   Marcella Wong       YOB: 1945       Age:  78 y.o. 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 Cipro    Admitted again yesterday concerning for worsening of sacral ulcer    During last admission patient was seen by infectious disease and also seen by the vascular surgeon patient had debridement of wound during the last admission    Patient seen by the infectious disease yesterday    Sacral wound infection recent cultures growing Pseudomonas resistant to Zosyn and meropenem    5/17    Patient alert awake not in distress  Patient was seen by the vascular surgeon    Vascular surgery planned for laparoscopic loop colostomy placement and sacral debridement then wound vacuum  Also try to close the wound on the left foot with TMA    5/18    Resting in the bed not in any distress  Blood sugars running high    Seen by infectious disease continue vancomycin and start on Unasyn    5/19    And went to the OR intubated because of elevated LFT and elevated INR  Surgery was canceled    On ventilator 40% O2  Propofol drip    5/20    Patient on ventilator with 30% O2  On propofol drip    Hemoglobin dropped to 6.8    Patient's daughter at the bedside    5/20    Patient on ventilator 30% O2  On propofol drip  Getting PEG tube feeding    5/21    Patient still on ventilator 30% O2  On propofol drip  Liver Function improving    5/22  On ventilator with 30% O2 on propofol drip    5/24  Awaiting tissue culture results.    On ventilator with 30% O2 on propofol drip  Left transmetatarsal amputation and Sacral wound excisional wound debridement 13 x 15 cm to the bone completed yesterday. CXR: Hazy mid and lower lung reticular markings. Dependent small to moderate volume,  right greater than left pleural effusions  Remarkable labs   WBC: 15.2   Hgb: 8.5   Na; 148  CRP: 13.60   Procal: 0.71    Objective:     Visit Vitals  BP (!) 94/57   Pulse (!) 53   Temp 97.4 °F (36.3 °C)   Resp 12   Ht 5' 7.01\" (1.702 m)   Wt 89 kg (196 lb 3.4 oz)   SpO2 100%   Breastfeeding No   BMI 30.72 kg/m²        Recent Results (from the past 24 hour(s))   PROTHROMBIN TIME + INR    Collection Time: 05/23/22  8:10 PM   Result Value Ref Range    Prothrombin time 19.4 (H) 11.9 - 14.6 sec    INR 1.6 (H) 0.9 - 1.1     METABOLIC PANEL, COMPREHENSIVE    Collection Time: 05/23/22  8:10 PM   Result Value Ref Range    Sodium 149 (H) 136 - 145 mmol/L    Potassium 3.8 3.5 - 5.1 mmol/L    Chloride 123 (H) 97 - 108 mmol/L    CO2 21 21 - 32 mmol/L    Anion gap 5 5 - 15 mmol/L    Glucose 122 (H) 65 - 100 mg/dL    BUN 46 (H) 6 - 20 mg/dL    Creatinine 0.91 0.55 - 1.02 mg/dL    BUN/Creatinine ratio 51 (H) 12 - 20      GFR est AA >60 >60 ml/min/1.73m2    GFR est non-AA >60 >60 ml/min/1.73m2    Calcium 7.8 (L) 8.5 - 10.1 mg/dL    Bilirubin, total 0.5 0.2 - 1.0 mg/dL    AST (SGOT) 38 (H) 15 - 37 U/L    ALT (SGPT) 350 (H) 12 - 78 U/L    Alk.  phosphatase 80 45 - 117 U/L    Protein, total 5.2 (L) 6.4 - 8.2 g/dL    Albumin 1.3 (L) 3.5 - 5.0 g/dL    Globulin 3.9 2.0 - 4.0 g/dL    A-G Ratio 0.3 (L) 1.1 - 2.2     BLOOD GAS, ARTERIAL    Collection Time: 05/23/22 10:20 PM   Result Value Ref Range    pH 7.35 7.35 - 7.45      PCO2 30 (L) 35 - 45 mmHg    PO2 85 75 - 100 mmHg    O2 SAT 97 >95 %    BICARBONATE 18 (L) 22 - 26 mmol/L    BASE DEFICIT 8.5 (H) 0 - 2 mmol/L    O2 METHOD VENT      FIO2 30.0 %    MODE Assist Control/Volume Control      Tidal volume 500      SET RATE 12      IPAP/PIP 0      EPAP/CPAP/PEEP 5.0      SITE Left Brachial      EVELIN'S TEST PASS     GLUCOSE, POC    Collection Time: 05/23/22 11:24 PM   Result Value Ref Range    Glucose (POC) 82 65 - 117 mg/dL    Performed by Radha Sanchez RN ([facility])    CBC WITH AUTOMATED DIFF    Collection Time: 05/24/22  4:00 AM   Result Value Ref Range    WBC 15.2 (H) 3.6 - 11.0 K/uL    RBC 3.00 (L) 3.80 - 5.20 M/uL    HGB 8.5 (L) 11.5 - 16.0 g/dL    HCT 27.4 (L) 35.0 - 47.0 %    MCV 91.3 80.0 - 99.0 FL    MCH 28.3 26.0 - 34.0 PG    MCHC 31.0 30.0 - 36.5 g/dL    RDW 17.4 (H) 11.5 - 14.5 %    PLATELET 741 173 - 804 K/uL    MPV 11.4 8.9 - 12.9 FL    NRBC 0.3 (H) 0.0  WBC    ABSOLUTE NRBC 0.04 (H) 0.00 - 0.01 K/uL    NEUTROPHILS 78 (H) 32 - 75 %    LYMPHOCYTES 12 12 - 49 %    MONOCYTES 5 5 - 13 %    EOSINOPHILS 3 0 - 7 %    BASOPHILS 1 0 - 1 %    IMMATURE GRANULOCYTES 1 (H) 0 - 0.5 %    ABS. NEUTROPHILS 11.9 (H) 1.8 - 8.0 K/UL    ABS. LYMPHOCYTES 1.9 0.8 - 3.5 K/UL    ABS. MONOCYTES 0.8 0.0 - 1.0 K/UL    ABS. EOSINOPHILS 0.5 (H) 0.0 - 0.4 K/UL    ABS. BASOPHILS 0.1 0.0 - 0.1 K/UL    ABS. IMM. GRANS. 0.2 (H) 0.00 - 0.04 K/UL    DF AUTOMATED     METABOLIC PANEL, COMPREHENSIVE    Collection Time: 05/24/22  4:00 AM   Result Value Ref Range    Sodium 148 (H) 136 - 145 mmol/L    Potassium 3.7 3.5 - 5.1 mmol/L    Chloride 123 (H) 97 - 108 mmol/L    CO2 18 (L) 21 - 32 mmol/L    Anion gap 7 5 - 15 mmol/L    Glucose 142 (H) 65 - 100 mg/dL    BUN 45 (H) 6 - 20 mg/dL    Creatinine 0.91 0.55 - 1.02 mg/dL    BUN/Creatinine ratio 49 (H) 12 - 20      GFR est AA >60 >60 ml/min/1.73m2    GFR est non-AA >60 >60 ml/min/1.73m2    Calcium 8.1 (L) 8.5 - 10.1 mg/dL    Bilirubin, total 0.5 0.2 - 1.0 mg/dL    AST (SGOT) 35 15 - 37 U/L    ALT (SGPT) 325 (H) 12 - 78 U/L    Alk.  phosphatase 83 45 - 117 U/L    Protein, total 6.1 (L) 6.4 - 8.2 g/dL    Albumin 1.4 (L) 3.5 - 5.0 g/dL    Globulin 4.7 (H) 2.0 - 4.0 g/dL    A-G Ratio 0.3 (L) 1.1 - 2.2     PROTHROMBIN TIME + INR    Collection Time: 05/24/22  4:00 AM   Result Value Ref Range Prothrombin time 19.4 (H) 11.9 - 14.6 sec    INR 1.6 (H) 0.9 - 1.1     PROCALCITONIN    Collection Time: 05/24/22  4:00 AM   Result Value Ref Range    Procalcitonin 0.71 (H) 0 ng/mL   C REACTIVE PROTEIN, QT    Collection Time: 05/24/22  4:00 AM   Result Value Ref Range    C-Reactive protein 13.60 (H) 0.00 - 0.60 mg/dL   GLUCOSE, POC    Collection Time: 05/24/22  5:36 AM   Result Value Ref Range    Glucose (POC) 154 (H) 65 - 117 mg/dL    Performed by Isaías Luis    GLUCOSE, POC    Collection Time: 05/24/22 11:11 AM   Result Value Ref Range    Glucose (POC) 184 (H) 65 - 117 mg/dL    Performed by Nayan Weiss    BLOOD GAS, ARTERIAL    Collection Time: 05/24/22  5:00 PM   Result Value Ref Range    pH 7.35 7.35 - 7.45      PCO2 29 (L) 35 - 45 mmHg    PO2 101 (H) 75 - 100 mmHg    O2 SAT 99 >95 %    BICARBONATE 18 (L) 22 - 26 mmol/L    BASE DEFICIT 8.5 (H) 0 - 2 mmol/L    O2 METHOD VENT      FIO2 30 %    MODE Assist Control/Volume Control      Tidal volume 500      SET RATE 12      EPAP/CPAP/PEEP 5      SITE Right Radial     GLUCOSE, POC    Collection Time: 05/24/22  5:50 PM   Result Value Ref Range    Glucose (POC) 206 (H) 65 - 117 mg/dL    Performed by Nayan Weiss      [unfilled]      Review of Systems    Not a good historian e. Physical Exam:      Constitutional: On ventilator  HENT:   Head: Normocephalic and atraumatic. Eyes: Pupils are equal, round, and reactive to light. EOM are normal.   Cardiovascular: Normal rate, regular rhythm and normal heart sounds. Pulmonary/Chest: Breath sounds normal. No wheezes. No rales. Exhibits no tenderness. Abdominal: Soft. Bowel sounds are normal. There is no abdominal tenderness. There is no rebound and no guarding. Musculoskeletal: Normal range of motion.    Neurological: On ventilator   skin sacral decubitus ulcer and left foot wound right big toe amputation    XR CHEST PORT   Final Result   Similar findings compared to single view chest 1 day earlier. XR CHEST PORT   Final Result   Findings/IMPRESSION: Stable appearance of endotracheal tube. Stable mild   enlargement of cardiomediastinal silhouette. Central vascular congestion with   bilateral perihilar and basilar opacities, consistent with edema and/or   pneumonia, right greater than left. Possible right pleural effusion. No   pneumothorax. XR CHEST PORT   Final Result   Bibasilar opacities, possibly increased on the right. XR CHEST PORT   Final Result   No significant change allowing for difference in technique and   positioning. Single portable AP view compared to yesterday. Suboptimal inspiratory film   compromises visualization of the lower lung fields. Monitoring equipment   overlies the body. The tip of the tube is approximately 3 cm above the jennifer. Mild apparent cardiomegaly. Left heart border and left diaphragm obscured. Hazy airspace opacification both lung bases may be a combination of atelectasis,   pneumonia, or edema. No large effusion. Negative for pneumothorax. XR CHEST PORT   Final Result   No significant change allowing for difference in technique and   positioning. Single portable AP view compared to yesterday. Rotation to the right. Monitoring   equipment overlies the body. Suboptimal inspiratory film compromises   visualization of the lower lung fields. Mild cardiomegaly. The tip of the ET tube is approximately 3 cm above the jennifer. Mild basal interstitial edema. No new consolidation. No pleural effusion or   pneumothorax. XR CHEST PORT   Final Result   1. The endotracheal tube is in satisfactory position. 2.  There has been a partial interval resolution in the pulmonary interstitial   edema pattern. XR CHEST PORT   Final Result   Ill-defined haziness in the mid to lower lung zones suspect for mild   atelectatic changes and/or interstitial fluid or pleural fluid. US ABD LTD   Final Result   1.  Question pericholecystic fluid. No identified gallstones or sludge. 2. Right pleural effusion. 3. Increased right renal echotexture for medical renal disease. XR CHEST PORT   Final Result   Intubation. Findings suggesting hydrostatic edema. XR CHEST PORT   Final Result   No evidence of an acute cardiopulmonary process. CT ABD PELV WO CONT    (Results Pending)   XR CHEST PORT    (Results Pending)        Recent Results (from the past 24 hour(s))   PROTHROMBIN TIME + INR    Collection Time: 05/23/22  8:10 PM   Result Value Ref Range    Prothrombin time 19.4 (H) 11.9 - 14.6 sec    INR 1.6 (H) 0.9 - 1.1     METABOLIC PANEL, COMPREHENSIVE    Collection Time: 05/23/22  8:10 PM   Result Value Ref Range    Sodium 149 (H) 136 - 145 mmol/L    Potassium 3.8 3.5 - 5.1 mmol/L    Chloride 123 (H) 97 - 108 mmol/L    CO2 21 21 - 32 mmol/L    Anion gap 5 5 - 15 mmol/L    Glucose 122 (H) 65 - 100 mg/dL    BUN 46 (H) 6 - 20 mg/dL    Creatinine 0.91 0.55 - 1.02 mg/dL    BUN/Creatinine ratio 51 (H) 12 - 20      GFR est AA >60 >60 ml/min/1.73m2    GFR est non-AA >60 >60 ml/min/1.73m2    Calcium 7.8 (L) 8.5 - 10.1 mg/dL    Bilirubin, total 0.5 0.2 - 1.0 mg/dL    AST (SGOT) 38 (H) 15 - 37 U/L    ALT (SGPT) 350 (H) 12 - 78 U/L    Alk.  phosphatase 80 45 - 117 U/L    Protein, total 5.2 (L) 6.4 - 8.2 g/dL    Albumin 1.3 (L) 3.5 - 5.0 g/dL    Globulin 3.9 2.0 - 4.0 g/dL    A-G Ratio 0.3 (L) 1.1 - 2.2     BLOOD GAS, ARTERIAL    Collection Time: 05/23/22 10:20 PM   Result Value Ref Range    pH 7.35 7.35 - 7.45      PCO2 30 (L) 35 - 45 mmHg    PO2 85 75 - 100 mmHg    O2 SAT 97 >95 %    BICARBONATE 18 (L) 22 - 26 mmol/L    BASE DEFICIT 8.5 (H) 0 - 2 mmol/L    O2 METHOD VENT      FIO2 30.0 %    MODE Assist Control/Volume Control      Tidal volume 500      SET RATE 12      IPAP/PIP 0      EPAP/CPAP/PEEP 5.0      SITE Left Brachial      EVELIN'S TEST PASS     GLUCOSE, POC    Collection Time: 05/23/22 11:24 PM   Result Value Ref Range Glucose (POC) 82 65 - 117 mg/dL    Performed by Thao Clark RN ([facility])    CBC WITH AUTOMATED DIFF    Collection Time: 05/24/22  4:00 AM   Result Value Ref Range    WBC 15.2 (H) 3.6 - 11.0 K/uL    RBC 3.00 (L) 3.80 - 5.20 M/uL    HGB 8.5 (L) 11.5 - 16.0 g/dL    HCT 27.4 (L) 35.0 - 47.0 %    MCV 91.3 80.0 - 99.0 FL    MCH 28.3 26.0 - 34.0 PG    MCHC 31.0 30.0 - 36.5 g/dL    RDW 17.4 (H) 11.5 - 14.5 %    PLATELET 471 189 - 366 K/uL    MPV 11.4 8.9 - 12.9 FL    NRBC 0.3 (H) 0.0  WBC    ABSOLUTE NRBC 0.04 (H) 0.00 - 0.01 K/uL    NEUTROPHILS 78 (H) 32 - 75 %    LYMPHOCYTES 12 12 - 49 %    MONOCYTES 5 5 - 13 %    EOSINOPHILS 3 0 - 7 %    BASOPHILS 1 0 - 1 %    IMMATURE GRANULOCYTES 1 (H) 0 - 0.5 %    ABS. NEUTROPHILS 11.9 (H) 1.8 - 8.0 K/UL    ABS. LYMPHOCYTES 1.9 0.8 - 3.5 K/UL    ABS. MONOCYTES 0.8 0.0 - 1.0 K/UL    ABS. EOSINOPHILS 0.5 (H) 0.0 - 0.4 K/UL    ABS. BASOPHILS 0.1 0.0 - 0.1 K/UL    ABS. IMM. GRANS. 0.2 (H) 0.00 - 0.04 K/UL    DF AUTOMATED     METABOLIC PANEL, COMPREHENSIVE    Collection Time: 05/24/22  4:00 AM   Result Value Ref Range    Sodium 148 (H) 136 - 145 mmol/L    Potassium 3.7 3.5 - 5.1 mmol/L    Chloride 123 (H) 97 - 108 mmol/L    CO2 18 (L) 21 - 32 mmol/L    Anion gap 7 5 - 15 mmol/L    Glucose 142 (H) 65 - 100 mg/dL    BUN 45 (H) 6 - 20 mg/dL    Creatinine 0.91 0.55 - 1.02 mg/dL    BUN/Creatinine ratio 49 (H) 12 - 20      GFR est AA >60 >60 ml/min/1.73m2    GFR est non-AA >60 >60 ml/min/1.73m2    Calcium 8.1 (L) 8.5 - 10.1 mg/dL    Bilirubin, total 0.5 0.2 - 1.0 mg/dL    AST (SGOT) 35 15 - 37 U/L    ALT (SGPT) 325 (H) 12 - 78 U/L    Alk.  phosphatase 83 45 - 117 U/L    Protein, total 6.1 (L) 6.4 - 8.2 g/dL    Albumin 1.4 (L) 3.5 - 5.0 g/dL    Globulin 4.7 (H) 2.0 - 4.0 g/dL    A-G Ratio 0.3 (L) 1.1 - 2.2     PROTHROMBIN TIME + INR    Collection Time: 05/24/22  4:00 AM   Result Value Ref Range    Prothrombin time 19.4 (H) 11.9 - 14.6 sec    INR 1.6 (H) 0.9 - 1.1     PROCALCITONIN Collection Time: 05/24/22  4:00 AM   Result Value Ref Range    Procalcitonin 0.71 (H) 0 ng/mL   C REACTIVE PROTEIN, QT    Collection Time: 05/24/22  4:00 AM   Result Value Ref Range    C-Reactive protein 13.60 (H) 0.00 - 0.60 mg/dL   GLUCOSE, POC    Collection Time: 05/24/22  5:36 AM   Result Value Ref Range    Glucose (POC) 154 (H) 65 - 117 mg/dL    Performed by Tyna Dance    GLUCOSE, POC    Collection Time: 05/24/22 11:11 AM   Result Value Ref Range    Glucose (POC) 184 (H) 65 - 117 mg/dL    Performed by Larry Borges    BLOOD GAS, ARTERIAL    Collection Time: 05/24/22  5:00 PM   Result Value Ref Range    pH 7.35 7.35 - 7.45      PCO2 29 (L) 35 - 45 mmHg    PO2 101 (H) 75 - 100 mmHg    O2 SAT 99 >95 %    BICARBONATE 18 (L) 22 - 26 mmol/L    BASE DEFICIT 8.5 (H) 0 - 2 mmol/L    O2 METHOD VENT      FIO2 30 %    MODE Assist Control/Volume Control      Tidal volume 500      SET RATE 12      EPAP/CPAP/PEEP 5      SITE Right Radial     GLUCOSE, POC    Collection Time: 05/24/22  5:50 PM   Result Value Ref Range    Glucose (POC) 206 (H) 65 - 117 mg/dL    Performed by Larry Borges        Results     Procedure Component Value Units Date/Time    CULTURE, TISSUE Elonda Locker STAIN [[id number]] Collected: 05/23/22 1815    Order Status: Completed Specimen: Tissue Updated: 05/24/22 1014    CULTURE, RESPIRATORY/SPUTUM/BRONCH Elonda Locker STAIN [[id number]] Collected: 05/19/22 1448    Order Status: Completed Specimen: Sputum Updated: 05/21/22 0813     Special Requests: No Special Requests        GRAM STAIN 1+ WBCs seen         no epithelial cells seen         No organisms seen        Culture result:       Rare Normal respiratory yasmine          CULTURE, Romina Staff STAIN [[id number]]  (Susceptibility) Collected: 05/15/22 1915    Order Status: Completed Specimen: Wound from Great Toe Updated: 05/18/22 0835     Special Requests: No Special Requests        GRAM STAIN       3+ Gram Positive Rods (Coryneform)                  1+ apparent Gram Negative Rods           Culture result:       Heavy Acinetobacter baumannii complex            Heavy Diphtheroids       Susceptibility      Acinetobacter baumannii complex     GEMA     Ampicillin/sulbactam ($) Susceptible     Cefepime ($$) Susceptible     Ceftazidime ($) Susceptible     Ceftriaxone ($) Intermediate     Ciprofloxacin ($) Resistant     Gentamicin ($) Susceptible     Levofloxacin ($) Resistant     Meropenem ($$) Resistant     Piperacillin/Tazobac ($) Resistant     Tobramycin ($) Susceptible     Trimeth/Sulfa Susceptible                  Linear View                   CULTURE, Janel Mort STAIN [[id number]]  (Susceptibility) Collected: 05/14/22 4379    Order Status: Completed Specimen: Wound from Ulcer Updated: 05/18/22 0459     Special Requests: No Special Requests        GRAM STAIN Few WBCs seen         2+ Gram Negative Rods               Occasional Gram Positive Cocci in pairs            Rare Gram Positive Rods        Culture result: Moderate Acinetobacter baumannii complex                  Moderate Enterococcus faecium                  Light >2 organisms - contaminated specimen.  suggest recollection Anaerobic gram negative rods Beta Lactamase Positive          Susceptibility      Acinetobacter baumannii complex     GEMA     Ampicillin/sulbactam ($) Susceptible     Cefepime ($$) Susceptible     Ceftazidime ($) Susceptible     Ceftriaxone ($) Intermediate     Ciprofloxacin ($) Resistant     Gentamicin ($) Susceptible     Levofloxacin ($) Resistant     Meropenem ($$) Resistant     Piperacillin/Tazobac ($) Resistant     Tobramycin ($) Susceptible     Trimeth/Sulfa Susceptible                  Linear View               Susceptibility      Enterococcus faecium     GEMA     Ampicillin ($) Resistant     Daptomycin ($$$$$)  See below  [1]      Linezolid ($$$$$) Susceptible     Vancomycin ($) Susceptible                 [1]  Dose Dependent  (SENSITIVITIES PERFORMED BY E-TEST)          Linear View                   CULTURE, BLOOD, PAIRED [[id number]]  (Abnormal) Collected: 05/14/22 2000    Order Status: Completed Specimen: Blood Updated: 05/17/22 1212     Special Requests: No Special Requests        Culture result:       Staphylococcus species, coagulase negative GROWING IN THE ANAEROBIC BOTTLE. No Site Indicated            (NOTE) PRELIMINARY REPORT OF GRAM POSITIVE COCCI IN CLUSTERS GROWING IN THE ANAEROBIC BOTTLE, CALLED TO AND READ BACK BY CANDI KNOWLES 5/16/22 1118 SCP. Labs:     Recent Labs     05/24/22 0400 05/23/22 1635 05/23/22 0420 05/23/22 0420   WBC 15.2*  --   --  17.2*   HGB 8.5* 8.9*   < > 9.0*   HCT 27.4*  --   --  28.6*     --   --  192    < > = values in this interval not displayed. Recent Labs     05/24/22 0400 05/23/22 2010 05/23/22 1635 05/23/22 0420 05/23/22 0420   * 149* 149*   < > 147*  147*   K 3.7 3.8 3.8   < > 4.1  4.1   * 123* 121*   < > 120*  120*   CO2 18* 21  --   --  18*  18*   BUN 45* 46*  --   --  49*  48*   CREA 0.91 0.91  --   --  1.09*  1.05*   * 122*  --   --  238*  240*   CA 8.1* 7.8*  --   --  8.0*  7.9*   MG  --   --   --   --  2.4   PHOS  --   --   --   --  3.4  3.4    < > = values in this interval not displayed. Recent Labs     05/24/22 0400 05/23/22 2010 05/23/22 0420   * 350* 525*   AP 83 80 98   TBILI 0.5 0.5 0.5   TP 6.1* 5.2* 5.9*   ALB 1.4* 1.3* 1.5*  1.5*   GLOB 4.7* 3.9 4.4*     Recent Labs     05/24/22 0400 05/23/22 2010 05/23/22  1615   INR 1.6* 1.6* 1.5*   PTP 19.4* 19.4* 18.6*      No results for input(s): FE, TIBC, PSAT, FERR in the last 72 hours. No results found for: FOL, RBCF   Recent Labs     05/24/22  1700 05/23/22  2220   PH 7.35 7.35   PCO2 29* 30*   PO2 101* 85     No results for input(s): CPK, CKNDX, TROIQ in the last 72 hours.     No lab exists for component: CPKMB  Lab Results   Component Value Date/Time    Cholesterol, total 124 03/08/2022 07:40 AM    HDL Cholesterol 30 03/08/2022 07:40 AM    LDL,Direct 79 06/28/2021 12:00 AM    LDL, calculated 44.8 03/08/2022 07:40 AM    Triglyceride 202 (H) 05/16/2022 06:20 AM    CHOL/HDL Ratio 4.1 03/08/2022 07:40 AM     Lab Results   Component Value Date/Time    Glucose (POC) 206 (H) 05/24/2022 05:50 PM    Glucose (POC) 184 (H) 05/24/2022 11:11 AM    Glucose (POC) 154 (H) 05/24/2022 05:36 AM    Glucose (POC) 82 05/23/2022 11:24 PM    Glucose (POC) 142 (H) 05/23/2022 02:55 PM     Lab Results   Component Value Date/Time    Color Yellow/Straw 05/14/2022 08:08 PM    Appearance Clear 05/14/2022 08:08 PM    Specific gravity 1.014 05/14/2022 08:08 PM    pH (UA) 6.0 05/14/2022 08:08 PM    Protein 30 (A) 05/14/2022 08:08 PM    Glucose 50 (A) 05/14/2022 08:08 PM    Ketone Negative 05/14/2022 08:08 PM    Bilirubin Negative 05/14/2022 08:08 PM    Urobilinogen 0.1 05/14/2022 08:08 PM    Nitrites Negative 05/14/2022 08:08 PM    Leukocyte Esterase Trace (A) 05/14/2022 08:08 PM    Bacteria Negative 05/14/2022 08:08 PM    Bacteria Rare (A) 05/14/2022 08:08 PM    WBC 0-4 05/14/2022 08:08 PM    WBC 4 05/14/2022 08:08 PM    RBC 0-5 05/14/2022 08:08 PM    RBC 1 05/14/2022 08:08 PM         Assessment:   Acute respiratory failure on ventilator 30% O2  Sacral decubitus ulcer postdebridement  Sepsis with leukocytosis elevated procalcitonin and CRP  Left foot wound  Recent removal of left great toe and second toe  CVA with PEG tube placement  History of aspiration pneumonia  History of chronic kidney disease  CAD with ejection fraction about 20 to 25%  Hypertension  Hyperlipidemia  Anemia of chronic disease  Hyperkalemia  Static post transfusion  Uncontrolled diabetes  Hepatic shock/improving  Coagulopathy  Elevated  Troponin  Bacteremia with coagulase-negative Staphylococcus  Procedure:  1. Left transmetatarsal amputation. 2.  Sacral wound excisional wound debridement 13 x 15 cm to the bone.       Plan:     Unasyn 3 g every 8 hours  Aspirin 81 mg daily  Questran 4 g twice a day  Pepcid 20 twice daily  Ferrous sulfate 325 mg twice a day  Gabapentin 300 mg twice a day  Hydralazine 50 mg 3 times a day  Metoprolol 25 daily  Replace potassium  Entresto 1 tablet twice a day  Vancomycin with pharmacy protocol  Add Lantus 10 units in the evening    GI consult for hepatic shock  Hematology consult for coagulopathy      Monitor liver function/INR  Static post transfusion    And daughter upset   By the vascular surgeon ordered PT/INR if INR less than 1.5 plan for laparoscopic loop colostomy    Overall prognosis very poor    Per Nephrology   Will change IV normal saline to half-normal saline to increase her free water intake.   Resuming free water with the PEG feeding will help in improving the serum sodium.       Current Facility-Administered Medications:     sodium chloride (NS) flush 5-40 mL, 5-40 mL, IntraVENous, Q8H, Jeanmarie Bob MD, 10 mL at 05/24/22 1416    sodium chloride (NS) flush 5-40 mL, 5-40 mL, IntraVENous, PRN, PaulinoJeanmarie MD    ondansetron (ZOFRAN ODT) tablet 4 mg, 4 mg, Oral, Q8H PRN **OR** ondansetron (ZOFRAN) injection 4 mg, 4 mg, IntraVENous, Q6H PRN, Jeanmarie Bob MD    enoxaparin (LOVENOX) injection 40 mg, 40 mg, SubCUTAneous, DAILY, PaulinoJeanmarie MD, 40 mg at 05/24/22 1002    dextrose 5% infusion, 30 mL/hr, IntraVENous, CONTINUOUS, Kalpesh Lara MD, Last Rate: 30 mL/hr at 05/24/22 1409, 30 mL/hr at 05/24/22 1409    insulin lispro (HUMALOG) injection, , SubCUTAneous, Q6H, Jeanmarie Bob MD, 1 Units at 05/24/22 1146    glucose chewable tablet 16 g, 4 Tablet, Oral, PRN, Jeanmarie Bob MD    glucagon [facility]) injection 1 mg, 1 mg, IntraMUSCular, PRN, PaulinoJeanmarie MD    dextrose 10% infusion 0-250 mL, 0-250 mL, IntraVENous, PRN, Paulino, Jeanmarie Bourgeois MD    [START ON 5/25/2022] Vancomycin random level to be drawn on 5/25/22 at 0400 am., , Other, Eron Ron MD    hydrALAZINE (APRESOLINE) tablet 12.5 mg, 12.5 mg, Oral, TID, Colette Bob MD, 12.5 mg at 05/24/22 5961    sacubitriL-valsartan (ENTRESTO) 24-26 mg tablet 1 Tablet, 1 Tablet, Oral, Q12H, Colette Bob MD, 1 Tablet at 05/24/22 5361    metoprolol succinate (TOPROL-XL) XL tablet 25 mg, 25 mg, Oral, DAILY, Colette Bob MD, 25 mg at 05/24/22 0806    ampicillin-sulbactam (UNASYN) 3 g in 0.9% sodium chloride (MBP/ADV) 100 mL MBP, 3 g, IntraVENous, Q8H, Colette Bob MD, Last Rate: 200 mL/hr at 05/24/22 1410, 3 g at 05/24/22 1410    dextrose 10% infusion 0-250 mL, 0-250 mL, IntraVENous, PRN, Colette Bob MD, 125 mL at 05/19/22 0537    insulin glargine (LANTUS) injection 10 Units, 10 Units, SubCUTAneous, QHS, Colette Bob MD, 10 Units at 05/23/22 2200    NOREPINephrine (LEVOPHED) 32,000 mcg in dextrose 5% 250 mL (128 mcg/mL) infusion, 2 mcg/min, IntraVENous, TITRATE, Colette Bob MD    propofol (DIPRIVAN) 10 mg/mL infusion, 0-50 mcg/kg/min, IntraVENous, TITRATE, Colette Bob MD, Last Rate: 14.3 mL/hr at 05/24/22 1637, 30 mcg/kg/min at 05/24/22 1637    0.9% sodium chloride infusion 250 mL, 250 mL, IntraVENous, PRN, Colette Bob MD, Last Rate: 15 mL/hr at 05/22/22 0820, Rate Verify at 05/22/22 0820    sodium hypochlorite (HALF STRENGTH DAKIN'S) 0.25% irrigation (bottle), , Topical, BID, Colette Bob MD, Given at 05/24/22 8181    Vancomycin Pharmacy Dosing, , Other, Rx Dosing/Monitoring, Colette Bob MD    sodium chloride (NS) flush 5-40 mL, 5-40 mL, IntraVENous, Q8H, Colette Bob MD, 10 mL at 05/24/22 1416    acetaminophen (TYLENOL) tablet 650 mg, 650 mg, Oral, Q6H PRN, 650 mg at 05/22/22 0556 **OR** acetaminophen (TYLENOL) suppository 650 mg, 650 mg, Rectal, Q6H PRN, Colette Bob MD, 650 mg at 05/16/22 2223    polyethylene glycol (MIRALAX) packet 17 g, 17 g, Oral, DAILY PRN, Colette Bob MD    ondansetron (ZOFRAN ODT) tablet 4 mg, 4 mg, Oral, Q8H PRN **OR** ondansetron (ZOFRAN) injection 4 mg, 4 mg, IntraVENous, Q6H PRN, Figueroa Foley MD  [facility] by provider] enoxaparin (LOVENOX) injection 40 mg, 40 mg, SubCUTAneous, DAILY, Stef Bob MD, 40 mg at 05/17/22 1006    famotidine (PEPCID) tablet 20 mg, 20 mg, Oral, BID, Setf Bob MD, 20 mg at 05/24/22 8850    acidophilus-pectin, citrus tablet 2 Tablet, 2 Tablet, Oral, DAILY, Stef Bob MD, 2 Tablet at 05/24/22 0806    albuterol-ipratropium (DUO-NEB) 2.5 MG-0.5 MG/3 ML, 3 mL, Nebulization, Q6H PRN, Stef Bob MD    artificial saliva (MOUTH KOTE) 1 Spray, 1 Spray, Oral, TID, Stef Bob MD, 1 [place] at 05/23/22 2129    aspirin delayed-release tablet 81 mg, 81 mg, Oral, DAILY, Stef Bob MD, 81 mg at 05/24/22 0806    brimonidine (ALPHAGAN) 0.2 % ophthalmic solution 1 Drop, 1 Drop, Both Eyes, TID, Stef Bob MD, 1 Drop at 05/24/22 1624    cholestyramine-aspartame (QUESTRAN LIGHT) packet 4 g, 4 g, Oral, BID WITH MEALS, Stef Bob MD, 4 g at 05/24/22 1623    [Held by provider] fenofibrate nanocrystallized (TRICOR) tablet 48 mg, 48 mg, Oral, DAILY, Stef Bob MD, 48 mg at 05/19/22 2353    ferrous sulfate tablet 325 mg, 325 mg, Oral, BID, Stef Bob MD, 325 mg at 05/24/22 5678    gabapentin (NEURONTIN) capsule 300 mg, 300 mg, Oral, BID, Stef Bob MD, 300 mg at 05/24/22 0806    insulin glargine (LANTUS) injection 50 Units, 50 Units, SubCUTAneous, DAILY, Stef Bob MD, 50 Units at 05/24/22 0806    latanoprost (XALATAN) 0.005 % ophthalmic solution 1 Drop, 1 Drop, Right Eye, QPM, Stef Bob MD, 1 Drop at 05/22/22 1720    traMADoL (ULTRAM) tablet 25 mg, 25 mg, Oral, Q12H PRN, Stef Bob MD, 25 mg at 05/18/22 5112

## 2022-05-24 NOTE — OP NOTES
This is operative report on Maame Atkins, patient's YOB: 1945. Date of surgery: May 23, 2022. Pre-op diagnosis:  1. Ischemic left foot with a nonhealing wound. 2.  Large open wound on the sacrum with sacral decubitus ulcer, nonhealing, wound size measures to be at least 13 x 15 cm. Postop diagnosis: Same as above    Procedure:  1. Left transmetatarsal amputation. 2.  Sacral wound excisional wound debridement 13 x 15 cm to the bone. Anesthesia: General.  Anesthesiologist:  [facility]  Assistant: None  EBL: 100 cc. Complication: None  Specimens: Excised wounds sent to microbiology. Implants: None  Condition: Stable. Indication for surgery: Ms. Olimpia Mcdowell currently hospitalized at an intensive care unit with a septic syndrome. Patient was planned on laparoscopic loop colostomy for fecal diversion and sacral wound debridement and closure of the left foot however surgery was canceled due to septic syndrome with coagulopathy from acute liver failure. Patient has been intubated since then. Patient's liver enzymes was normalizing. Anesthesia recommend not to perform laparoscopic loop colostomy due to significant congestive heart failure. Therefore I discussed with family excisional wound debridement of the sacrum and the closure of the left foot with transmetatarsal amputation. We discussed rational for the procedure risks benefits and complications. Patient's family has signed surgical consent. Patient will need eventual laparoscopic loop colostomy for fecal diversion. Description of procedure: Patient was brought to the operating table comfortable supine position. Followed by anesthesia was initiated. Followed by patient is appropriate positioned with right lateral decubitus position exposing the sacrum. Sacral area was prepped using Betadine solution. Followed by sterile drapes were placed usual fashion.   At this time timeout was called after confirmation was made procedure commenced. Using #15 blade and pickups excisional wound debridement was done over the necrotic area. Necrotic area down to the bone. Large amount of necrotic tissue was removed size as discussed about. Excised wounds sent to microbiology for culture. Wounds are covered with wet-to-dry saline gauze with Kerlix rolls ABD pads. Followed by patient's left foot was prepped using Betadine solution. Followed by sterile drapes placed usual fashion. Followed by dorsal and plantar incision was made including left foot toes 3, 4, and 5 and previous to amputation site to the level of the proximal metatarsal joint. Full-thickness skin incision made to the tendon and musculature. There was essentially no blood flow to the tissue when incision was made. Followed by in order to close the wound part of the cuneiform bone has to be resected. Followed by wounds are thoroughly cleaned and the wounds are closed in layers with 3-0 Vicryl sutures and skin was closed with 3-0 plain sutures. Sterile dressings applied.   Patient tolerated procedure well without any complication and transferred back to ICU stable condition

## 2022-05-24 NOTE — PROGRESS NOTES
Hematology and Oncology Progress Note    Patient: Kati Burr MRN: [id number]  SSN: [id number]    YOB: 1945  Age: 78 y.o. Sex: female      Admit Date: 5/14/2022    LOS: 10 days     Chief Complaint: Patient is intubated and unresponsive    Subjective:     Patient is intubated and unresponsive    Objective:     Vitals:    05/24/22 0400 05/24/22 0430 05/24/22 0500 05/24/22 0600   BP: (!) 112/51   120/60   Pulse: 62 62 62 62   Resp: 12 12 12 13   Temp: 98.1 °F (36.7 °C)   97.5 °F (36.4 °C)   SpO2: 100% 100% 100% 100%   Weight:       Height:              Physical Exam:   Constitutional: -American female. Unresponsive and intubated  Eyes: Conjunctiva is pale  Respiratory: Intubated  Cardiovascular: Normal sinus rhythm. Neurologic: Unresponsive    Lab/Data Review:      Results for Ismael Morataya (MRN [id number]) as of 5/24/2022 20:21   Ref. Range 5/23/2022 04:20 5/23/2022 16:35 5/24/2022 04:00   WBC Latest Ref Range: 3.6 - 11.0 K/uL 17.2 (H)  15.2 (H)   NRBC Latest Ref Range: 0.0  WBC 0.6 (H)  0.3 (H)   RBC Latest Ref Range: 3.80 - 5.20 M/uL 3.16 (L)  3.00 (L)   HGB Latest Ref Range: 11.5 - 16.0 g/dL 9.0 (L) 8.9 (L) 8.5 (L)   HCT Latest Ref Range: 35.0 - 47.0 % 28.6 (L)  27.4 (L)   MCV Latest Ref Range: 80.0 - 99.0 FL 90.5  91.3   MCH Latest Ref Range: 26.0 - 34.0 PG 28.5  28.3   MCHC Latest Ref Range: 30.0 - 36.5 g/dL 31.5  31.0   RDW Latest Ref Range: 11.5 - 14.5 % 17.3 (H)  17.4 (H)   PLATELET Latest Ref Range: 150 - 400 K/uL 192  190         Assessment and plan:     89-year-old female  admitted with severe decubitus ulcer related infection/sepsis. 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    2) patient is currently in shock secondary to sepsis/cardiogenic shock.  -Patient's LFTs are now within normal limits     3) coagulopathy: Appears to be secondary to liver failure.  -Patient had vitamin K.  -No evidence of DIC.   She has normal fibrinogen and PTT. -Received 2 units of plasma for concerns for bleeding. No bleeding is reported today.  -Patient's platelet counts are normal at 192,000. INR is 1.6. Patient had sacral wound surgery yesterday and did not have any unusual bleeding.    4) anemia: Acute on chronic anemia.  Likely secondary to CKD and some bleeding from ongoing wound problems.  -Patient has received packed RBC transfusion earlier this admission.  -Patient's hemoglobin is 8.5 today.    Transfuse for hemoglobin less than 7 g/dL       This dictation was done by dragon, computer voice recognition software. Often unanticipated grammatical, syntax, phones and other interpretive errors are inadvertently transcribed. Please excuse errors that have escaped final proofreading.        Signed By: Matthew Krishna MD     May 24, 2022

## 2022-05-24 NOTE — WOUND CARE
Negative Pressure    NAME:  Nandini Morales  YOB: 1945  MEDICAL RECORD NUMBER:  [id number]  DATE:  5/14/2022     Applied Negative Pressure to surgical incision to sacrum   [x] Applied skin barrier prep to leah-wound.  [x] Cut strips of plastic drape to picture frame wound so that leah-wound is     covered with the drape.  [x] If bridging dressing to less prominent site, cover any intact skin that will come in contact with the Negative Pressure Therapy sponge, gauze or channel drain with plastic drape. The sponge should never touch intact skin.  [x] Cut sponge, gauze or channel drain to size which will fit into the wound/ulcer bed without being forced.  [x] Be sure the sponge is large enough to hold the entire round plastic flange which is attached to the tubing. Never allow flange to be larger than the sponge or it will produce suction damaging intact skin.  Total number of individual pieces of foam used within the wound bed: 1 x oil emulsion gauze; 2 x black foam   [x] If bridging the dressing away from the primary site, be sure the bridge leads to a piece of sponge large enough to hold the entire flange without allowing any of the flange to overlap onto intact skin.  [x] Covered sponge, gauze or channel drain with plastic drape.  [x] Cut a hole in this plastic drape directly over the sponge the same size as the plastic drain tubing.  [x] Removed plastic liner from flange and apply it directly over the hole you cut.  [x] Removed the plastic cover from the flange.  [x] Attached the tubing to the wound/ulcer Negative Pressure Therapy and turn it on to be sure a vacuum is created and that there are no leaks.  [x] If air leaks occur, use plastic drape to patch them.  [] Secured Negative Pressure Therapy dressing with ace wrap loosely if located on an extremity. Maintain tubing outside of ace wrap. Tubing must not exert pressure on intact skin.     Applied per  Guidelines  Applied stoma paste cleft area above anus at inferior wound aspect. Oil emulsion gauze applied over wound bed and small area of bone exposure. Granufoam dressing then applied and covered with drape. Bridged track pad to left anterior thigh to prevent skin injury from tubing. Applied Exuderm over inferior aspect of periwound to prevent stool from seeping into drape. Explained to Marissa Craig and Asya Soria CNA to clean stool off Exuderm but leave intact if not compromised by stool. Explained to 2 family members present at the time, that if stool seeps beneath drape, then patient will have to have a wet/dry applied until [facility] can re-apply wound vac dressing. If dressing remains intact, plan to add instillation therapy to NPWT with NS to help debride remaining slough from wound bed. Requested that Johanny ONEILL round on patient twice hourly if possible to keep patient clean of stool. Will continue to follow. Media Information             Document Information    Photographic Image:  Mobile Media Capture   [place]   05/24/2022 12:26   Attached To:    Hospital Encounter on 5/14/22     Source Information    Flako Duggan RN  [facility]       Electronically signed by Pooja Bourne RN on 5/24/2022 at 12:35 PM

## 2022-05-24 NOTE — PROGRESS NOTES
Progress Note    Patient: Julio Alves MRN: [id number]  SSN: [id number]    YOB: 1945  Age: 78 y.o. Sex: female      Admit Date: 5/14/2022    LOS: 10 days     Subjective:     Patient seen and examined. This is a patient who is followed for new onset cardiomyopathy and elevated troponin following rapid decompensation prior to loop colostomy, sacral wound debridement, and amputation of left transmetatarsal.  Remains intubated/sedated with Propofol. Is s/p Left transmetatarsal amputation and sacral wound excisional wound debridement 13 x 15 cm to the bone 5/23 with possible plans for laparoscopic loop colostomy for fecal diversion tomorrow. Family at bedside. Stable today, however CXR showing congestion and effusions. Lengthy discussion held with family in regards to risk stratification for upcoming surgery. Telemetry Review: SR with occasional PVC    Review of Symptoms: intubated/sedated      Objective:     Vitals:    05/24/22 1400 05/24/22 1500 05/24/22 1534 05/24/22 1600   BP: 100/61 91/66  (!) 94/59   Pulse: (!) 59 (!) 55 (!) 54 (!) 54   Resp: 12 12 13 12   Temp:  97.4 °F (36.3 °C)     SpO2: 100% 100% 100% 100%   Weight:       Height:            Intake and Output:  Current Shift: 05/24 0701 - 05/24 1900  In: 1376.9 [I.V.:656.9]  Out: -   Last three shifts: 05/22 1901 - 05/24 0700  In: 3891.9 [I.V.:2461.9]  Out: 1500 [Urine:1500]    Physical Exam:   General: Chronically ill appearing female lying in bed intubated/sedated, NAD  HEENT: Normocephalic, PERRL, no drainage  Neck: Supple, Trachea midline, No JVD  RESP: Coarse globally with diminished lower lobes. + Symmetrical chest movement. 30% FiO2. Intubated/Ventilated. Cardiovascular: RRR no RG. + murmur.   PVS: No rubor, cyanosis, mild pitting BLE edema, Radial, DP, PT pulses equal bilaterally  ABD: obese, soft, NT, Normoactive BS  Derm: +arterial line,+sacral wound vac  :+giraldo catheter  Neuro: Sedated with propofol, open eyes and followed simple command of blinking  PSYCH: No anxiety or agitation      Lab/Data Review:  BMP:   Lab Results   Component Value Date/Time     (H) 05/24/2022 04:00 AM    K 3.7 05/24/2022 04:00 AM     (H) 05/24/2022 04:00 AM    CO2 18 (L) 05/24/2022 04:00 AM    AGAP 7 05/24/2022 04:00 AM     (H) 05/24/2022 04:00 AM    BUN 45 (H) 05/24/2022 04:00 AM    CREA 0.91 05/24/2022 04:00 AM    GFRAA >60 05/24/2022 04:00 AM    GFRNA >60 05/24/2022 04:00 AM            Current Facility-Administered Medications:     sodium chloride (NS) flush 5-40 mL, 5-40 mL, IntraVENous, Q8H, Jorge Bob MD, 10 mL at 05/24/22 1416    sodium chloride (NS) flush 5-40 mL, 5-40 mL, IntraVENous, PRN, Jorge Bob MD    ondansetron (ZOFRAN ODT) tablet 4 mg, 4 mg, Oral, Q8H PRN **OR** ondansetron (ZOFRAN) injection 4 mg, 4 mg, IntraVENous, Q6H PRN, Jorge Bob MD    enoxaparin (LOVENOX) injection 40 mg, 40 mg, SubCUTAneous, DAILY, Jorge Bob MD, 40 mg at 05/24/22 1002    dextrose 5% infusion, 30 mL/hr, IntraVENous, CONTINUOUS, Kalpesh Lara MD, Last Rate: 30 mL/hr at 05/24/22 1409, 30 mL/hr at 05/24/22 1409    insulin lispro (HUMALOG) injection, , SubCUTAneous, Q6H, Jorge Bob MD, 1 Units at 05/24/22 1146    glucose chewable tablet 16 g, 4 Tablet, Oral, PRN, Jorge Bob MD    glucagon [facility]) injection 1 mg, 1 mg, IntraMUSCular, PRN, Jorge Bob MD    dextrose 10% infusion 0-250 mL, 0-250 mL, IntraVENous, PRN, Jorge Bob MD    [START ON 5/25/2022] Vancomycin random level to be drawn on 5/25/22 at 0400 am., , Other, Silvia Alva MD    hydrALAZINE (APRESOLINE) tablet 12.5 mg, 12.5 mg, Oral, TID, Jorge Bob MD, 12.5 mg at 05/24/22 0703    sacubitriL-valsartan (ENTRESTO) 24-26 mg tablet 1 Tablet, 1 Tablet, Oral, Q12H, Jorge Bob MD, 1 Tablet at 05/24/22 4255    metoprolol succinate (TOPROL-XL) XL tablet 25 mg, 25 mg, Oral, DAILY, Jorge Bob MD, 25 mg at 05/24/22 0806    ampicillin-sulbactam (UNASYN) 3 g in 0.9% sodium chloride (MBP/ADV) 100 mL MBP, 3 g, IntraVENous, Q8H, Kadeem Bob MD, Last Rate: 200 mL/hr at 05/24/22 1410, 3 g at 05/24/22 1410    dextrose 10% infusion 0-250 mL, 0-250 mL, IntraVENous, PRN, Kadeem Bob MD, 125 mL at 05/19/22 0537    insulin glargine (LANTUS) injection 10 Units, 10 Units, SubCUTAneous, QHS, Kadeem Bob MD, 10 Units at 05/23/22 2200    NOREPINephrine (LEVOPHED) 32,000 mcg in dextrose 5% 250 mL (128 mcg/mL) infusion, 2 mcg/min, IntraVENous, TITRATE, Kadeem Bob MD    propofol (DIPRIVAN) 10 mg/mL infusion, 0-50 mcg/kg/min, IntraVENous, TITRATE, Kadeem Bob MD, Last Rate: 14.3 mL/hr at 05/24/22 0805, 30 mcg/kg/min at 05/24/22 0805    0.9% sodium chloride infusion 250 mL, 250 mL, IntraVENous, PRN, Kadeem Bob MD, Last Rate: 15 mL/hr at 05/22/22 0820, Rate Verify at 05/22/22 0820    sodium hypochlorite (HALF STRENGTH DAKIN'S) 0.25% irrigation (bottle), , Topical, BID, Kadeem Bob MD, Given at 05/24/22 9311    Vancomycin Pharmacy Dosing, , Other, Rx Dosing/Monitoring, Kadeem Bob MD    sodium chloride (NS) flush 5-40 mL, 5-40 mL, IntraVENous, Q8H, Kadeem Bob MD, 10 mL at 05/24/22 1416    acetaminophen (TYLENOL) tablet 650 mg, 650 mg, Oral, Q6H PRN, 650 mg at 05/22/22 0556 **OR** acetaminophen (TYLENOL) suppository 650 mg, 650 mg, Rectal, Q6H PRN, Kadeem Bob MD, 650 mg at 05/16/22 2223    polyethylene glycol (MIRALAX) packet 17 g, 17 g, Oral, DAILY PRN, Kadeem Bob MD    ondansetron (ZOFRAN ODT) tablet 4 mg, 4 mg, Oral, Q8H PRN **OR** ondansetron (ZOFRAN) injection 4 mg, 4 mg, IntraVENous, Q6H PRN, Jerod Ni MD    [facility] [place] by provider] enoxaparin (LOVENOX) injection 40 mg, 40 mg, SubCUTAneous, DAILY, Kadeem oBb MD, 40 mg at 05/17/22 1006    famotidine (PEPCID) tablet 20 mg, 20 mg, Oral, BID, Kadeem Bob MD, 20 mg at 05/24/22 9658    acidophilus-pectin, citrus tablet 2 Tablet, 2 Tablet, Oral, DAILY, Lydia Bob MD, 2 Tablet at 05/24/22 0806    albuterol-ipratropium (DUO-NEB) 2.5 MG-0.5 MG/3 ML, 3 mL, Nebulization, Q6H PRN, Lydia Bob MD    artificial saliva (MOUTH KOTE) 1 Spray, 1 Spray, Oral, TID, Lydia Bob MD, 1 [place] at 05/23/22 2129    aspirin delayed-release tablet 81 mg, 81 mg, Oral, DAILY, Lydia Bob MD, 81 mg at 05/24/22 0806    brimonidine (ALPHAGAN) 0.2 % ophthalmic solution 1 Drop, 1 Drop, Both Eyes, TID, Lydia Bob MD, 1 Drop at 05/24/22 0807    cholestyramine-aspartame (QUESTRAN LIGHT) packet 4 g, 4 g, Oral, BID WITH MEALS, Lydia Bob MD, 4 g at 05/24/22 1672    [Held by provider] fenofibrate nanocrystallized (TRICOR) tablet 48 mg, 48 mg, Oral, DAILY, Lydia Bob MD, 48 mg at 05/19/22 3804    ferrous sulfate tablet 325 mg, 325 mg, Oral, BID, Lydia Bob MD, 325 mg at 05/24/22 2608    gabapentin (NEURONTIN) capsule 300 mg, 300 mg, Oral, BID, Lydia Bob MD, 300 mg at 05/24/22 0806    insulin glargine (LANTUS) injection 50 Units, 50 Units, SubCUTAneous, DAILY, Lydia Bob MD, 50 Units at 05/24/22 0806    latanoprost (XALATAN) 0.005 % ophthalmic solution 1 Drop, 1 Drop, Right Eye, QPM, Lydia Bob MD, 1 Drop at 05/22/22 1720    traMADoL (ULTRAM) tablet 25 mg, 25 mg, Oral, Q12H PRN, Lydia Bob MD, 25 mg at 05/18/22 0056      Assessment:     Active Problems:    Sacral ulcer ([address] (5/14/2022)        Plan:     Case discussed with Collaborating physician Dr. Saskia Quintero and our recommendations are as follows:     1. New onset cardiomyopathy:  Echo 5/18/22 showing EF of 20-25% down from 50-55% in 4/2022.  Continue Tier 1 Entresto and will further wean hydralazine to 12.5mg TID to allow for titration of Entresto. May continue BB given it was a home medication. Agree with transition to Metoprolol Succinate to align with GDMT. Mildly hypervolemic today.   Agree with Lasix 40mg IV x1 after discussing with Nephrology (given free water deficits and D5 @ 30cc/hr).  Will need ischemic evaluation, but it is deferred at this time given acute clinic state and need for intervention, despite evaluation and findings it would not change that patient is high risk for upcoming procedure. Discussed this with family in detail and they reported that they still wanted to move forward with procedure with Dr. Darek Villafana.  2. Elevated troponin:  Peaked at 890 on the day of deterioration requiring intubation/sedation.  No asa presently as required transfusion for Hgb 6.8 5/20 with blood per rectum with dislodgement of Flexi-Seal.  Continue beta blocker.  Ischemic evaluation pending post- op clinical course.  No statin given LFTs elevated likely due to shock. Will start when appropriate. Can consider Risk factor modification labs. 3.   NSVT: None in the last 48 hours.  Continue beta blocker. Maintain lytes. 4.   Sepsis:  Per primary/ID. 5. Hypokalemia: recommend repletion to goal 4-5. Check magnesium level. Continue telemetry. Repeat labs in am.  6. Hypoalbuminemia: Albumin 1.4. Consider nutritionist consult. 7. Risk Stratification for upcoming laparoscopic loop colostomy for fecal diversion: Given above patient is at high risk. Lengthy discussion had with family discussing this and they are aware. Will continue medical management and assistance throughout. Thank you for involving us in the care of this patient. Please do not hesitate to call if additional questions arise.  If after hours please call [phone]    Signed By: Yola Vergara NP     May 24, 2022

## 2022-05-24 NOTE — PROGRESS NOTES
IMPRESSION:   1. Acute hypoxic respiratory failure remains on the ventilator  2. Sacral decubiti ulcer with Acinetobacter and Enterococcus  3. Severe sepsis with septic shock resolved on no pressors  4. Left foot wound  5. Transaminitis continue to improve  6. Shock liver  7. Hypokalemia repleted  8. Symptomatic anemia stable after transfusion  9. Cardiomyopathy  10. Mild pulmonary edema      RECOMMENDATIONS/PLAN:   1. ICU monitoring  1. Ventilator for mechanical life support and prevent respiratory arrest with protective lung strategies  2. Maintain ventilator until surgical procedures completed  3. Pending loop colostomy  4. Liver enzymes improved   5. Patient underwent on 5/23 left metatarsal amputation and sacral wound debridement  6. Severe sepsis with decubiti ulcer   7. Patient is on Unasyn  8. Chest x-ray shows mild congestive changes with fluid in the minor fissure  9. Anemia hemoglobin improved after transfusion 5/20     [x] High complexity decision making was performed  [x] See my orders for details  HPI   42-year-old lady came in because of leg pain past medical history of hypertension diabetes mellitus peripheral vascular disease CVA she is bedbound she has leg wound and also has sacral decubiti ulcer and she was scheduled for laparoscopic colostomy and sacral wound debridement and amputation of the left tarsometatarsal because of wound infection but her condition got worse her liver enzyme was elevated INR was also elevated she was intubated transferred to ICU was getting vancomycin and Unasyn started on Levophed because of low blood pressure. PMH:  has a past medical history of Anemia, Chronic kidney disease, Diabetes mellitus ([address] Hemiplegia affecting dominant side, post-stroke ([address] (05/04/2022), HTN (hypertension), Hyperkalemia, Hyperlipidemia, Ill-defined condition, Neuropathy, PAD (peripheral artery disease) ([address] Sciatica, Stroke ([address] and UTI (urinary tract infection).     PSH:   has a past surgical history that includes hx other surgical (Bilateral); hx amputation (Right); hx other surgical; hx cervical fusion; hx vascular stent (Bilateral); hx vascular stent (Bilateral); and hx gi. FHX: family history includes Cancer in her mother; Diabetes in her mother; Hypertension in her mother. SHX:  reports that she has never smoked. She has never used smokeless tobacco. She reports previous alcohol use. She reports that she does not use drugs.     ALL: No Known Allergies     MEDS:   [x] Reviewed - As Below   [] Not reviewed    Current Facility-Administered Medications   Medication    sodium chloride (NS) flush 5-40 mL    sodium chloride (NS) flush 5-40 mL    ondansetron (ZOFRAN ODT) tablet 4 mg    Or    ondansetron (ZOFRAN) injection 4 mg    enoxaparin (LOVENOX) injection 40 mg    insulin lispro (HUMALOG) injection    glucose chewable tablet 16 g    glucagon (GLUCAGEN) injection 1 mg    dextrose 10% infusion 0-250 mL    [START ON 5/25/2022] Vancomycin random level to be drawn on 5/25/22 at 0400 am.    0.45% sodium chloride infusion    hydrALAZINE (APRESOLINE) tablet 12.5 mg    sacubitriL-valsartan (ENTRESTO) 24-26 mg tablet 1 Tablet    metoprolol succinate (TOPROL-XL) XL tablet 25 mg    ampicillin-sulbactam (UNASYN) 3 g in 0.9% sodium chloride (MBP/ADV) 100 mL MBP    dextrose 10% infusion 0-250 mL    insulin glargine (LANTUS) injection 10 Units    NOREPINephrine (LEVOPHED) 32,000 mcg in dextrose 5% 250 mL (128 mcg/mL) infusion    propofol (DIPRIVAN) 10 mg/mL infusion    0.9% sodium chloride infusion 250 mL    sodium hypochlorite (HALF STRENGTH DAKIN'S) 0.25% irrigation (bottle)    Vancomycin Pharmacy Dosing    sodium chloride (NS) flush 5-40 mL    acetaminophen (TYLENOL) tablet 650 mg    Or    acetaminophen (TYLENOL) suppository 650 mg    polyethylene glycol (MIRALAX) packet 17 g    ondansetron (ZOFRAN ODT) tablet 4 mg    Or    ondansetron (ZOFRAN) injection 4 mg    [Held by provider] enoxaparin (LOVENOX) injection 40 mg    famotidine (PEPCID) tablet 20 mg    acidophilus-pectin, citrus tablet 2 Tablet    albuterol-ipratropium (DUO-NEB) 2.5 MG-0.5 MG/3 ML    artificial saliva (MOUTH KOTE) 1 Spray    aspirin delayed-release tablet 81 mg    brimonidine (ALPHAGAN) 0.2 % ophthalmic solution 1 Drop    cholestyramine-aspartame (QUESTRAN LIGHT) packet 4 g    [Held by provider] fenofibrate nanocrystallized (TRICOR) tablet 48 mg    ferrous sulfate tablet 325 mg    gabapentin (NEURONTIN) capsule 300 mg    insulin glargine (LANTUS) injection 50 Units    latanoprost (XALATAN) 0.005 % ophthalmic solution 1 Drop    traMADoL (ULTRAM) tablet 25 mg      MAR reviewed and pertinent medications noted or modified as needed   Current Facility-Administered Medications   Medication    sodium chloride (NS) flush 5-40 mL    sodium chloride (NS) flush 5-40 mL    ondansetron (ZOFRAN ODT) tablet 4 mg    Or    ondansetron (ZOFRAN) injection 4 mg    enoxaparin (LOVENOX) injection 40 mg    insulin lispro (HUMALOG) injection    glucose chewable tablet 16 g    glucagon (GLUCAGEN) injection 1 mg    dextrose 10% infusion 0-250 mL    [START ON 5/25/2022] Vancomycin random level to be drawn on 5/25/22 at 0400 am.    0.45% sodium chloride infusion    hydrALAZINE (APRESOLINE) tablet 12.5 mg    sacubitriL-valsartan (ENTRESTO) 24-26 mg tablet 1 Tablet    metoprolol succinate (TOPROL-XL) XL tablet 25 mg    ampicillin-sulbactam (UNASYN) 3 g in 0.9% sodium chloride (MBP/ADV) 100 mL MBP    dextrose 10% infusion 0-250 mL    insulin glargine (LANTUS) injection 10 Units    NOREPINephrine (LEVOPHED) 32,000 mcg in dextrose 5% 250 mL (128 mcg/mL) infusion    propofol (DIPRIVAN) 10 mg/mL infusion    0.9% sodium chloride infusion 250 mL    sodium hypochlorite (HALF STRENGTH DAKIN'S) 0.25% irrigation (bottle)    Vancomycin Pharmacy Dosing    sodium chloride (NS) flush 5-40 mL    acetaminophen (TYLENOL) tablet 650 mg    Or    acetaminophen (TYLENOL) suppository 650 mg    polyethylene glycol (MIRALAX) packet 17 g    ondansetron (ZOFRAN ODT) tablet 4 mg    Or    ondansetron (ZOFRAN) injection 4 mg    [Held by provider] enoxaparin (LOVENOX) injection 40 mg    famotidine (PEPCID) tablet 20 mg    acidophilus-pectin, citrus tablet 2 Tablet    albuterol-ipratropium (DUO-NEB) 2.5 MG-0.5 MG/3 ML    artificial saliva (MOUTH KOTE) 1 Spray    aspirin delayed-release tablet 81 mg    brimonidine (ALPHAGAN) 0.2 % ophthalmic solution 1 Drop    cholestyramine-aspartame (QUESTRAN LIGHT) packet 4 g    [Held by provider] fenofibrate nanocrystallized (TRICOR) tablet 48 mg    ferrous sulfate tablet 325 mg    gabapentin (NEURONTIN) capsule 300 mg    insulin glargine (LANTUS) injection 50 Units    latanoprost (XALATAN) 0.005 % ophthalmic solution 1 Drop    traMADoL (ULTRAM) tablet 25 mg      PMH:  has a past medical history of Anemia, Chronic kidney disease, Diabetes mellitus ([facility] [address] Hemiplegia affecting dominant side, post-stroke ([facility] [address] (05/04/2022), HTN (hypertension), Hyperkalemia, Hyperlipidemia, Ill-defined condition, Neuropathy, PAD (peripheral artery disease) ([facility] [address] Sciatica, Stroke ([address] and UTI (urinary tract infection). PSH:   has a past surgical history that includes hx other surgical (Bilateral); hx amputation (Right); hx other surgical; hx cervical fusion; hx vascular stent (Bilateral); hx vascular stent (Bilateral); and hx gi. FHX: family history includes Cancer in her mother; Diabetes in her mother; Hypertension in her mother. SHX:  reports that she has never smoked. She has never used smokeless tobacco. She reports previous alcohol use. She reports that she does not use drugs.      ROS:  Unable to obtain intubated on ventilator    Hemodynamics:    CO:    CI:    CVP:    SVR:   PAP Systolic:    PAP Diastolic:    PVR:    HW40:        Ventilator Settings:      Mode Rate TV Press PEEP FiO2 PIP Min. Vent   Assist control,Volume control    500 ml    5 cm H20 30 %  30 cm H2O  10.9 l/min        Vital Signs: Telemetry:    normal sinus rhythm Intake/Output:   Visit Vitals  /60 (BP 1 Location: Left upper arm, BP Patient Position: At rest)   Pulse 63   Temp 97.5 °F (36.4 °C)   Resp 12   Ht 5' 7.01\" (1.702 m)   Wt 89 kg (196 lb 3.4 oz)   SpO2 100%   Breastfeeding No   BMI 30.72 kg/m²       Temp (24hrs), Av.6 °F (36.4 °C), Min:96.9 °F (36.1 °C), Max:98.1 °F (36.7 °C)        O2 Device: Ventilator O2 Flow Rate (L/min): 1 l/min       Wt Readings from Last 4 Encounters:   22 89 kg (196 lb 3.4 oz)   04/10/22 86.6 kg (191 lb)   22 82.2 kg (181 lb 3.5 oz)   20 84.4 kg (186 lb)          Intake/Output Summary (Last 24 hours) at 2022 0905  Last data filed at 2022 0551  Gross per 24 hour   Intake 300 ml   Output 1000 ml   Net -700 ml       Last shift:      No intake/output data recorded. Last 3 shifts:  1901 -  0700  In: 1569.7 [I.V.:329.7]  Out: 1500 [Urine:1500]       Physical Exam:     General: intubated on vent unresponsive on propofol  HEENT: NCAT,  Eyes: anicteric; conjunctiva clear doll's eye reflex present  Neck: no nodes, no cuff leak, trach midline; no accessory MM use. Neck veins obscured by obesity but seems slightly engorged  Chest: no deformity,   Cardiac: R regular; no murmur;   Lungs: distant breath sounds; no wheezes rales in the bases  Abd: soft, NT, hypoactive BS  Ext: Ace edema; no joint swelling;  No clubbing  :clear urine  Neuro: Positive on propofol doll's eye reflex present flaccid extremities  Psych-  unable to assess  Skin: warm, dry, no cyanosis;   Pulses: Brachial and radial pulses intact  Capillary: Normal capillary refill      DATA:    MAR reviewed and pertinent medications noted or modified as needed  MEDS:   Current Facility-Administered Medications   Medication    sodium chloride (NS) flush 5-40 mL    sodium chloride (NS) flush 5-40 mL    ondansetron (ZOFRAN ODT) tablet 4 mg    Or    ondansetron (ZOFRAN) injection 4 mg    enoxaparin (LOVENOX) injection 40 mg    insulin lispro (HUMALOG) injection    glucose chewable tablet 16 g    glucagon (GLUCAGEN) injection 1 mg    dextrose 10% infusion 0-250 mL    [START ON 5/25/2022] Vancomycin random level to be drawn on 5/25/22 at 0400 am.    0.45% sodium chloride infusion    hydrALAZINE (APRESOLINE) tablet 12.5 mg    sacubitriL-valsartan (ENTRESTO) 24-26 mg tablet 1 Tablet    metoprolol succinate (TOPROL-XL) XL tablet 25 mg    ampicillin-sulbactam (UNASYN) 3 g in 0.9% sodium chloride (MBP/ADV) 100 mL MBP    dextrose 10% infusion 0-250 mL    insulin glargine (LANTUS) injection 10 Units    NOREPINephrine (LEVOPHED) 32,000 mcg in dextrose 5% 250 mL (128 mcg/mL) infusion    propofol (DIPRIVAN) 10 mg/mL infusion    0.9% sodium chloride infusion 250 mL    sodium hypochlorite (HALF STRENGTH DAKIN'S) 0.25% irrigation (bottle)    Vancomycin Pharmacy Dosing    sodium chloride (NS) flush 5-40 mL    acetaminophen (TYLENOL) tablet 650 mg    Or    acetaminophen (TYLENOL) suppository 650 mg    polyethylene glycol (MIRALAX) packet 17 g    ondansetron (ZOFRAN ODT) tablet 4 mg    Or    ondansetron (ZOFRAN) injection 4 mg    [Held by provider] enoxaparin (LOVENOX) injection 40 mg    famotidine (PEPCID) tablet 20 mg    acidophilus-pectin, citrus tablet 2 Tablet    albuterol-ipratropium (DUO-NEB) 2.5 MG-0.5 MG/3 ML    artificial saliva (MOUTH KOTE) 1 Spray    aspirin delayed-release tablet 81 mg    brimonidine (ALPHAGAN) 0.2 % ophthalmic solution 1 Drop    cholestyramine-aspartame (QUESTRAN LIGHT) packet 4 g    [Held by provider] fenofibrate nanocrystallized (TRICOR) tablet 48 mg    ferrous sulfate tablet 325 mg    gabapentin (NEURONTIN) capsule 300 mg    insulin glargine (LANTUS) injection 50 Units    latanoprost (XALATAN) 0.005 % ophthalmic solution 1 Drop    traMADoL (ULTRAM) tablet 25 mg Labs:    Recent Labs     22  16122  0622   WBC 15.2*  --   --   --   --  17.2*  --  17.3*   HGB 8.5*  --  8.9*  --   --  9.0*   < > 8.7*     --   --   --   --  192  --  202   INR 1.6* 1.6*  --  1.5*   < >  --   --  1.7*    < > = values in this interval not displayed. Recent Labs     22   * 149* 149*   < > 147*  147*   K 3.7 3.8 3.8   < > 4.1  4.1   * 123* 121*   < > 120*  120*   CO2 18* 21  --   --  18*  18*   * 122*  --   --  238*  240*   BUN 45* 46*  --   --  49*  48*   CREA 0.91 0.91  --   --  1.09*  1.05*   CA 8.1* 7.8*  --   --  8.0*  7.9*   MG  --   --   --   --  2.4   PHOS  --   --   --   --  3.4  3.4   ALB 1.4* 1.3*  --   --  1.5*  1.5*   * 350*  --   --  525*    < > = values in this interval not displayed. Recent Labs     22   PH 7.35 7.34* 7.34*   PCO2 30* 31* 31*   PO2 85 91 91   HCO3 18* 18* 18*   FIO2 30.0 30 30    AC 12  PEEP 5 FiO2 30%   echo ejection fraction 20% mild mitral regurg left atrium 3.5 cm RVSP 35  , 1313, 1361    Lab Results   Component Value Date/Time    Culture result: Rare Normal respiratory yasmine 2022 02:48 PM    Culture result: Heavy Acinetobacter baumannii complex 05/15/2022 07:15 PM    Culture result: Heavy Diphtheroids 05/15/2022 07:15 PM     Lab Results   Component Value Date/Time    TSH 2.880 2016 10:13 AM        Imagin/22 chest x-ray with ET tube in place hazy accentuated basilar markings with small amount of fluid in the minor fissure  Results from Hospital Encounter encounter on 22    XR CHEST PORT    Narrative  Portable chest, 2022, 1937 hours, compared with earlier today at 0242  hours.     Impression  Findings/IMPRESSION: Stable appearance of endotracheal tube. Stable mild  enlargement of cardiomediastinal silhouette. Central vascular congestion with  bilateral perihilar and basilar opacities, consistent with edema and/or  pneumonia, right greater than left. Possible right pleural effusion. No  pneumothorax. [facility]22    CT HEAD WO CONT    Narrative  PROCEDURE: CT HEAD WO CONT    HISTORY:Altered mental status    COMPARISON:Head CT 3 March 2022    Department policy stipulates all CT scans at this facility are performed using  dose reduction optimization techniques as appropriate to the performed exam,  including the following: Automated exposure control, adjustments of the mA  and/or KVP according to the patient size, and the use of iterative  reconstruction technique. TECHNIQUE: Without IV contrast    Bones/extracranial soft tissues:  Maxillary sinuses and right frontal sinus  remains nearly completely opacified. Extra-axial spaces:  No hemorrhage, mass or focal fluid collection. Ventricles/sulci:  There is mild dilatation of the ventricles. Sulci are  prominent. Brain parenchyma: An area of encephalomalacia in the left frontal lobe is  showing chronic evolution without evidence of hemorrhagic conversion. No other  focal lesions identified. No mass effect. There is good gray-white differentiation. There are  inhomogeneous areas of mildly decreased density in periventricular white matter. Impression  Chronic changes which appear stable. No acute or active intracranial process.   Chronic paranasal sinus disease      5/20 intubated on ventilator we will continue current vent settings patient is supposed to go for surgery because of transaminitis elevated liver enzyme we will wait as per surgeon for sacral wound debridement and diverting loop colostomy off Levophed, improvement in AST and ALT  5/21 continue with the current vent settings ABG acceptable liver enzyme improving awaiting for the surgery  5/22 maintain ventilator as is in anticipation of surgery.   Renal function improved mild congestion on chest x-ray we will give 1 dose Lasix and potassium  5/23 patient is going for surgery today we will leave ventilator as it is INR is 1.5  Time of care 30 minutes

## 2022-05-24 NOTE — ANESTHESIA POSTPROCEDURE EVALUATION
Procedure(s):  Left Foot Transmetatarsal Amputation  Sacral Debridement. general    Anesthesia Post Evaluation      Multimodal analgesia: multimodal analgesia used between 6 hours prior to anesthesia start to PACU discharge  Patient location during evaluation: ICU  Level of consciousness: sleepy but conscious  Pain score: 0  Airway patency: patent  Anesthetic complications: no  Cardiovascular status: acceptable  Respiratory status: acceptable  Hydration status: acceptable  Comments: This patient was transferred to the ICU by anesthesia personnel. The patient was handed off to the ICU nursing team.  All questions regarding pre-, intra-, and postoperative care were answered.   Post anesthesia nausea and vomiting:  controlled  Final Post Anesthesia Temperature Assessment:  Normothermia (36.0-37.5 degrees C)      INITIAL Post-op Vital signs:   Vitals Value Taken Time   BP     Temp     Pulse 64 05/23/22 1929   Resp 12 05/23/22 1929   SpO2 98 % 05/23/22 1929

## 2022-05-24 NOTE — PROGRESS NOTES
Surgery critical care Progress Note     Subjective:   Patient examined in ICU. Hospital Course:  Patient was taken to the OR yesterday with a sacral wound debridement. Patient has currently a large open wound at least 13 x 15 cm. Left foot wounds are closed with a TMA. Patient has essentially minimal blood flow left foot when wounds are closed. Subjective:   Unable to assess        Review of Systems  Unable to assess     Objective:      Visit Vitals  /60 (BP 1 Location: Left upper arm, BP Patient Position: At rest)   Pulse 62   Temp 97.5 °F (36.4 °C)   Resp 13   Ht 5' 7.01\" (1.702 m)   Wt 196 lb 3.4 oz (89 kg)   SpO2 100%   Breastfeeding No   BMI 30.72 kg/m²       Patient is intubated  Head and neck atraumatic, normocephalic. ENT: No hoarse voice  Cardiac system regular rate rhythm. Pulmonary no audible wheeze  Chest wall excursion normal with respiration cycle  Abdomen is soft not particularly distended. Neurologically nonfocal.  Skin is warm and moist.  Psychosocial: unable to assess  Vascular examination as previously noted no changes. Data Review reviewed. Assessment / Plan:  Anesthesia team did not recommend a laparoscopic surgery for colostomy for fecal diversion. We will have a cardiac evaluation for preop assessment for laparoscopic loop colostomy. Chest x-ray shows congestive failure with pleural effusion, recommend IV Lasix. Family has requested wound VAC to be applied. So we will apply wound VAC on the sacrum today. Lower end of the wounds at the perianal area close to the anal verge about 1.5 inch from anal opening. I am not sure we will get good seal for wound VAC. But we will try. Patient's left foot is concerning even though wounds are closed essentially there is minimal flow. I have updated family yesterday after surgery about vascular status and left foot and discussed about putting wound VAC today.     Patient will need fecal diversion procedure with a loop colostomy in order for wound to heal.  Wait for cardiology recommendation. Critical care time spent 30minutes involving direct patient care as well as reviewing patient's labs and coordination of care with nursing staff     Care Plan discussed with: Patient/Family/RN/Case Management           Total time spent with patient: 30 minutes.

## 2022-05-24 NOTE — PROGRESS NOTES
Progress Note    Patient: Jessenia Hudson MRN: [id number]  SSN: [id number]    YOB: 1945  Age: 78 y.o. Sex: female      Admit Date: 5/14/2022    LOS: 10 days     Subjective:   Patient followed for sacral wound infection with repeat culture growing Acinetobacter and Enterococci. Left foot wound also grew Acinetobacter. Sacral wound debridement and loop colostomy was interrupted because of hypotension and respiratory failure. Her LFTs increased significantly which GI attributes to hypotension. WBC procal and CRP remain elevated. She is currently on Vancomycin and Ceftazidime. Daughter at bedside. Objective:     Vitals:    05/23/22 2200 05/23/22 2300 05/23/22 2347 05/24/22 0000   BP: (!) 103/54 123/62  (!) 97/51   Pulse: (!) 55 63 69 69   Resp: 12 13 15 14   Temp: 97.5 °F (36.4 °C)   97.9 °F (36.6 °C)   SpO2: 100% 100% (!) 10% 100%   Weight:       Height:            Intake and Output:  Current Shift: No intake/output data recorded. Last three shifts: 05/22 0701 - 05/23 1900  In: 2344.9 [I.V.:744.9]  Out: 950 [Urine:950]    Physical Exam:    Vitals and nursing note reviewed. Constitutional:       General: She is not in acute distress. Appearance: She is ill-appearing. HENT: ET Tube  Eyes: closed   Cardiovascular:      Rate and Rhythm: Normal rate and regular rhythm. Heart sounds: No murmur heard. Pulmonary: diffuse rhonchi     Effort: Pulmonary effort is normal.      Breath sounds: Normal breath sounds. Abdominal:      General: Bowel sounds are normal.      Palpations: Abdomen is soft. Tenderness: There is no abdominal tenderness. Comments: PEG site unremarkable   Genitourinary:     Comments: Indwelling Bell with clear urine             Musculoskeletal:      Cervical back: Neck supple. Right lower leg: No edema. Left lower leg: No edema. Comments: Left foot wound with normal granulation tissue   Skin:     Findings: No rash.              Comments: Stage 3 sacral wound   Neurological: intubated   Psychiatric:  intubated      Lab/Data Review:     WBC 17,200  AST 1,420 <>2000/ALT 1,492    Procal 1.02 <1.72 <2.32 <0.59 <0.57  CRP 13.90 <17.10 <18.00 <18.90 <17.90    Blood cultures (5/14) Coagulase negative Staphylococci  Sacral wound (5/14) Acinetobacter baumannii sensitive to Unasyn, Cefepime, Ceftazidime and Enterococcus faecium  Left great toe (5/15) Acinetobacter baumannii  Sputum culture (5/19) Rare normal respiratory yasmine  Assessment:     Active Problems:    Sacral ulcer ([address] (5/14/2022)    1. Sacral wound infection secondary now to Acinetobacter baumannii and Enterococcus faecium, on Vancomycin and Unasyn  2. Sepsis with persistent leukocytosis, elevated procal and CRP  3. Left foot wound, culture growing Acinetobacter baumannii  4. Possible ischemic hepatitiis with transaminitis following hypotensive episode  5. Positive blood cultures for Coagulase negative Staphylococci, probable contaminant  6. Hepatitis C antibody positive, resolved (negative HCV viral level)    Comment:  WBC remains elevated but procal and CRP decreasing. Sputum grew normal yasmine. Plan:   1. Continue IV Unasyn  2.  In am, repeat CBC, CRP, procal      Signed By: Tayla Almonte MD     May 24, 2022

## 2022-05-25 NOTE — PROGRESS NOTES
General Daily Progress Note          Patient Name:   Rossy Ramires       YOB: 1945       Age:  78 y.o. 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 Cipro    Admitted again yesterday concerning for worsening of sacral ulcer    During last admission patient was seen by infectious disease and also seen by the vascular surgeon patient had debridement of wound during the last admission    Patient seen by the infectious disease yesterday    Sacral wound infection recent cultures growing Pseudomonas resistant to Zosyn and meropenem    5/17    Patient alert awake not in distress  Patient was seen by the vascular surgeon    Vascular surgery planned for laparoscopic loop colostomy placement and sacral debridement then wound vacuum  Also try to close the wound on the left foot with TMA    5/18    Resting in the bed not in any distress  Blood sugars running high    Seen by infectious disease continue vancomycin and start on Unasyn    5/19    And went to the OR intubated because of elevated LFT and elevated INR  Surgery was canceled    On ventilator 40% O2  Propofol drip    5/20    Patient on ventilator with 30% O2  On propofol drip    Hemoglobin dropped to 6.8    Patient's daughter at the bedside    5/20    Patient on ventilator 30% O2  On propofol drip  Getting PEG tube feeding    5/21    Patient still on ventilator 30% O2  On propofol drip  Liver Function improving    5/22  On ventilator with 30% O2 on propofol drip    5/24  Awaiting tissue culture results.    On ventilator with 30% O2 on propofol drip  Left transmetatarsal amputation and Sacral wound excisional wound debridement 13 x 15 cm to the bone completed yesterday. CXR: Hazy mid and lower lung reticular markings. Dependent small to moderate volume,  right greater than left pleural effusions  Remarkable labs   WBC: 15.2   Hgb: 8.5   Na; 148  CRP: 13.60   Procal: 0.71    5/25  Plan for laparoscopic loop colostomy for fecal diversion today. Awaiting tissue culture results.  Gram stain showed few GNRs   On ventilator with 30% O2 on propofol drip  Remarkable labs   WBC: 16.8  Hgb: 10.0  K: 3.4   CRP: 15.00  Procal: 0.45    Objective:     Visit Vitals  /60 (BP 1 Location: Left lower arm, BP Patient Position: At rest)   Pulse 74   Temp 97.6 °F (36.4 °C)   Resp 12   Ht 5' 7.01\" (1.702 m)   Wt 196 lb 3.4 oz (89 kg)   SpO2 100%   Breastfeeding No   BMI 30.72 kg/m²        Recent Results (from the past 24 hour(s))   GLUCOSE, POC    Collection Time: 05/24/22 11:11 AM   Result Value Ref Range    Glucose (POC) 184 (H) 65 - 117 mg/dL    Performed by Ximena Lucas    BLOOD GAS, ARTERIAL    Collection Time: 05/24/22  5:00 PM   Result Value Ref Range    pH 7.35 7.35 - 7.45      PCO2 29 (L) 35 - 45 mmHg    PO2 101 (H) 75 - 100 mmHg    O2 SAT 99 >95 %    BICARBONATE 18 (L) 22 - 26 mmol/L    BASE DEFICIT 8.5 (H) 0 - 2 mmol/L    O2 METHOD VENT      FIO2 30 %    MODE Assist Control/Volume Control      Tidal volume 500      SET RATE 12      EPAP/CPAP/PEEP 5      SITE Right Radial     GLUCOSE, POC    Collection Time: 05/24/22  5:50 PM   Result Value Ref Range    Glucose (POC) 206 (H) 65 - 117 mg/dL    Performed by [address] [place] [address], POC    Collection Time: 05/25/22 12:03 AM   Result Value Ref Range    Glucose (POC) 196 (H) 65 - 117 mg/dL    Performed by Awilda GIBBS, ALLOCATE    Collection Time: 05/25/22  5:45 AM   Result Value Ref Range    Unit number [id number]     Blood component type FP 24h,Thaw1     Unit division 00     Status of unit Issued     TRANSFUSION STATUS Ok to transfuse    BLOOD GAS, ARTERIAL    Collection Time: 05/25/22  5:50 AM   Result Value Ref Range    pH 7.33 (L) 7.35 - 7.45      PCO2 29 (L) 35 - 45 mmHg    PO2 121 (H) 75 - 100 mmHg    O2 SAT 99 >95 %    BICARBONATE 17 (L) 22 - 26 mmol/L    BASE DEFICIT 9.4 (H) 0 - 2 mmol/L    O2 METHOD VENT      FIO2 30.0 %    MODE Assist Control/Volume Control      Tidal volume 500      SET RATE 12      SITE Arterial Line     GLUCOSE, POC    Collection Time: 05/25/22  5:51 AM   Result Value Ref Range    Glucose (POC) 140 (H) 65 - 117 mg/dL    Performed by Mana Little    MAGNESIUM    Collection Time: 05/25/22  6:01 AM   Result Value Ref Range    Magnesium 2.2 1.6 - 2.4 mg/dL   PHOSPHORUS    Collection Time: 05/25/22  6:01 AM   Result Value Ref Range    Phosphorus 3.3 2.6 - 4.7 mg/dL   VANCOMYCIN, RANDOM    Collection Time: 05/25/22  6:01 AM   Result Value Ref Range    Vancomycin, random 12.1 ug/mL   CBC WITH AUTOMATED DIFF    Collection Time: 05/25/22  6:01 AM   Result Value Ref Range    WBC 16.8 (H) 3.6 - 11.0 K/uL    RBC 3.57 (L) 3.80 - 5.20 M/uL    HGB 10.0 (L) 11.5 - 16.0 g/dL    HCT 31.4 (L) 35.0 - 47.0 %    MCV 88.0 80.0 - 99.0 FL    MCH 28.0 26.0 - 34.0 PG    MCHC 31.8 30.0 - 36.5 g/dL    RDW 17.0 (H) 11.5 - 14.5 %    PLATELET 095 940 - 207 K/uL    MPV 11.1 8.9 - 12.9 FL    NRBC 0.2 (H) 0.0  WBC    ABSOLUTE NRBC 0.04 (H) 0.00 - 0.01 K/uL    NEUTROPHILS 80 (H) 32 - 75 %    LYMPHOCYTES 10 (L) 12 - 49 %    MONOCYTES 5 5 - 13 %    EOSINOPHILS 3 0 - 7 %    BASOPHILS 1 0 - 1 %    IMMATURE GRANULOCYTES 1 (H) 0 - 0.5 %    ABS. NEUTROPHILS 13.4 (H) 1.8 - 8.0 K/UL    ABS. LYMPHOCYTES 1.7 0.8 - 3.5 K/UL    ABS. MONOCYTES 0.8 0.0 - 1.0 K/UL    ABS. EOSINOPHILS 0.5 (H) 0.0 - 0.4 K/UL    ABS. BASOPHILS 0.1 0.0 - 0.1 K/UL    ABS. IMM.  GRANS. 0.2 (H) 0.00 - 0.04 K/UL    DF AUTOMATED     METABOLIC PANEL, COMPREHENSIVE    Collection Time: 05/25/22  6:01 AM   Result Value Ref Range    Sodium 145 136 - 145 mmol/L Potassium 3.4 (L) 3.5 - 5.1 mmol/L    Chloride 119 (H) 97 - 108 mmol/L    CO2 18 (L) 21 - 32 mmol/L    Anion gap 8 5 - 15 mmol/L    Glucose 156 (H) 65 - 100 mg/dL    BUN 35 (H) 6 - 20 mg/dL    Creatinine 0.72 0.55 - 1.02 mg/dL    BUN/Creatinine ratio 49 (H) 12 - 20      GFR est AA >60 >60 ml/min/1.73m2    GFR est non-AA >60 >60 ml/min/1.73m2    Calcium 8.7 8.5 - 10.1 mg/dL    Bilirubin, total 0.5 0.2 - 1.0 mg/dL    AST (SGOT) 28 15 - 37 U/L    ALT (SGPT) 276 (H) 12 - 78 U/L    Alk. phosphatase 96 45 - 117 U/L    Protein, total 7.2 6.4 - 8.2 g/dL    Albumin 1.6 (L) 3.5 - 5.0 g/dL    Globulin 5.6 (H) 2.0 - 4.0 g/dL    A-G Ratio 0.3 (L) 1.1 - 2.2     C REACTIVE PROTEIN, QT    Collection Time: 05/25/22  6:01 AM   Result Value Ref Range    C-Reactive protein 15.00 (H) 0.00 - 0.60 mg/dL   PROCALCITONIN    Collection Time: 05/25/22  6:01 AM   Result Value Ref Range    Procalcitonin 0.45 (H) 0 ng/mL   PROTHROMBIN TIME + INR    Collection Time: 05/25/22  7:45 AM   Result Value Ref Range    Prothrombin time 18.8 (H) 11.9 - 14.6 sec    INR 1.6 (H) 0.9 - 1.1       [unfilled]      Review of Systems    Not a good historian e. Physical Exam:      Constitutional: On ventilator  HENT:   Head: Normocephalic and atraumatic. Eyes: Pupils are equal, round, and reactive to light. EOM are normal.   Cardiovascular: Normal rate, regular rhythm and normal heart sounds. Pulmonary/Chest: Breath sounds normal. No wheezes. No rales. Exhibits no tenderness. Abdominal: Soft. Bowel sounds are normal. There is no abdominal tenderness. There is no rebound and no guarding. Musculoskeletal: Normal range of motion. Neurological: On ventilator   skin sacral decubitus ulcer and left foot wound right big toe amputation    XR CHEST PORT   Final Result      CT ABD PELV WO CONT   Final Result   Third spacing of fluids with small volume ascites, small-moderate   pleural effusions, and mild body wall edema. Bibasilar atelectasis.       XR CHEST PORT   Final Result   Similar findings compared to single view chest 1 day earlier. XR CHEST PORT   Final Result   Findings/IMPRESSION: Stable appearance of endotracheal tube. Stable mild   enlargement of cardiomediastinal silhouette. Central vascular congestion with   bilateral perihilar and basilar opacities, consistent with edema and/or   pneumonia, right greater than left. Possible right pleural effusion. No   pneumothorax. XR CHEST PORT   Final Result   Bibasilar opacities, possibly increased on the right. XR CHEST PORT   Final Result   No significant change allowing for difference in technique and   positioning. Single portable AP view compared to yesterday. Suboptimal inspiratory film   compromises visualization of the lower lung fields. Monitoring equipment   overlies the body. The tip of the tube is approximately 3 cm above the jennifer. Mild apparent cardiomegaly. Left heart border and left diaphragm obscured. Hazy airspace opacification both lung bases may be a combination of atelectasis,   pneumonia, or edema. No large effusion. Negative for pneumothorax. XR CHEST PORT   Final Result   No significant change allowing for difference in technique and   positioning. Single portable AP view compared to yesterday. Rotation to the right. Monitoring   equipment overlies the body. Suboptimal inspiratory film compromises   visualization of the lower lung fields. Mild cardiomegaly. The tip of the ET tube is approximately 3 cm above the jennifer. Mild basal interstitial edema. No new consolidation. No pleural effusion or   pneumothorax. XR CHEST PORT   Final Result   1. The endotracheal tube is in satisfactory position. 2.  There has been a partial interval resolution in the pulmonary interstitial   edema pattern.       XR CHEST PORT   Final Result   Ill-defined haziness in the mid to lower lung zones suspect for mild   atelectatic changes and/or interstitial fluid or pleural fluid. US ABD LTD   Final Result   1. Question pericholecystic fluid. No identified gallstones or sludge. 2. Right pleural effusion. 3. Increased right renal echotexture for medical renal disease. XR CHEST PORT   Final Result   Intubation. Findings suggesting hydrostatic edema. XR CHEST PORT   Final Result   No evidence of an acute cardiopulmonary process.       XR CHEST PORT    (Results Pending)        Recent Results (from the past 24 hour(s))   GLUCOSE, POC    Collection Time: 05/24/22 11:11 AM   Result Value Ref Range    Glucose (POC) 184 (H) 65 - 117 mg/dL    Performed by Irving Taylor, ARTERIAL    Collection Time: 05/24/22  5:00 PM   Result Value Ref Range    pH 7.35 7.35 - 7.45      PCO2 29 (L) 35 - 45 mmHg    PO2 101 (H) 75 - 100 mmHg    O2 SAT 99 >95 %    BICARBONATE 18 (L) 22 - 26 mmol/L    BASE DEFICIT 8.5 (H) 0 - 2 mmol/L    O2 METHOD VENT      FIO2 30 %    MODE Assist Control/Volume Control      Tidal volume 500      SET RATE 12      EPAP/CPAP/PEEP 5      SITE Right Radial     GLUCOSE, POC    Collection Time: 05/24/22  5:50 PM   Result Value Ref Range    Glucose (POC) 206 (H) 65 - 117 mg/dL    Performed by Sudie Paget    GLUCOSE, POC    Collection Time: 05/25/22 12:03 AM   Result Value Ref Range    Glucose (POC) 196 (H) 65 - 117 mg/dL    Performed by Sandie Mejia    PLASMA, ALLOCATE    Collection Time: 05/25/22  5:45 AM   Result Value Ref Range    Unit number [id number]     Blood component type FP 24h,Thaw1     Unit division 00     Status of unit Issued     TRANSFUSION STATUS Ok to transfuse    BLOOD GAS, ARTERIAL    Collection Time: 05/25/22  5:50 AM   Result Value Ref Range    pH 7.33 (L) 7.35 - 7.45      PCO2 29 (L) 35 - 45 mmHg    PO2 121 (H) 75 - 100 mmHg    O2 SAT 99 >95 %    BICARBONATE 17 (L) 22 - 26 mmol/L    BASE DEFICIT 9.4 (H) 0 - 2 mmol/L    O2 METHOD VENT      FIO2 30.0 %    MODE Assist Control/Volume Control      Tidal volume 500      SET RATE 12      SITE Arterial Line     GLUCOSE, POC    Collection Time: 05/25/22  5:51 AM   Result Value Ref Range    Glucose (POC) 140 (H) 65 - 117 mg/dL    Performed by Wilfrido Noble    Collection Time: 05/25/22  6:01 AM   Result Value Ref Range    Magnesium 2.2 1.6 - 2.4 mg/dL   PHOSPHORUS    Collection Time: 05/25/22  6:01 AM   Result Value Ref Range    Phosphorus 3.3 2.6 - 4.7 mg/dL   VANCOMYCIN, RANDOM    Collection Time: 05/25/22  6:01 AM   Result Value Ref Range    Vancomycin, random 12.1 ug/mL   CBC WITH AUTOMATED DIFF    Collection Time: 05/25/22  6:01 AM   Result Value Ref Range    WBC 16.8 (H) 3.6 - 11.0 K/uL    RBC 3.57 (L) 3.80 - 5.20 M/uL    HGB 10.0 (L) 11.5 - 16.0 g/dL    HCT 31.4 (L) 35.0 - 47.0 %    MCV 88.0 80.0 - 99.0 FL    MCH 28.0 26.0 - 34.0 PG    MCHC 31.8 30.0 - 36.5 g/dL    RDW 17.0 (H) 11.5 - 14.5 %    PLATELET 133 236 - 203 K/uL    MPV 11.1 8.9 - 12.9 FL    NRBC 0.2 (H) 0.0  WBC    ABSOLUTE NRBC 0.04 (H) 0.00 - 0.01 K/uL    NEUTROPHILS 80 (H) 32 - 75 %    LYMPHOCYTES 10 (L) 12 - 49 %    MONOCYTES 5 5 - 13 %    EOSINOPHILS 3 0 - 7 %    BASOPHILS 1 0 - 1 %    IMMATURE GRANULOCYTES 1 (H) 0 - 0.5 %    ABS. NEUTROPHILS 13.4 (H) 1.8 - 8.0 K/UL    ABS. LYMPHOCYTES 1.7 0.8 - 3.5 K/UL    ABS. MONOCYTES 0.8 0.0 - 1.0 K/UL    ABS. EOSINOPHILS 0.5 (H) 0.0 - 0.4 K/UL    ABS. BASOPHILS 0.1 0.0 - 0.1 K/UL    ABS. IMM.  GRANS. 0.2 (H) 0.00 - 0.04 K/UL    DF AUTOMATED     METABOLIC PANEL, COMPREHENSIVE    Collection Time: 05/25/22  6:01 AM   Result Value Ref Range    Sodium 145 136 - 145 mmol/L    Potassium 3.4 (L) 3.5 - 5.1 mmol/L    Chloride 119 (H) 97 - 108 mmol/L    CO2 18 (L) 21 - 32 mmol/L    Anion gap 8 5 - 15 mmol/L    Glucose 156 (H) 65 - 100 mg/dL    BUN 35 (H) 6 - 20 mg/dL    Creatinine 0.72 0.55 - 1.02 mg/dL    BUN/Creatinine ratio 49 (H) 12 - 20      GFR est AA >60 >60 ml/min/1.73m2    GFR est non-AA >60 >60 ml/min/1.73m2    Calcium 8.7 8.5 - 10.1 mg/dL Bilirubin, total 0.5 0.2 - 1.0 mg/dL    AST (SGOT) 28 15 - 37 U/L    ALT (SGPT) 276 (H) 12 - 78 U/L    Alk.  phosphatase 96 45 - 117 U/L    Protein, total 7.2 6.4 - 8.2 g/dL    Albumin 1.6 (L) 3.5 - 5.0 g/dL    Globulin 5.6 (H) 2.0 - 4.0 g/dL    A-G Ratio 0.3 (L) 1.1 - 2.2     C REACTIVE PROTEIN, QT    Collection Time: 05/25/22  6:01 AM   Result Value Ref Range    C-Reactive protein 15.00 (H) 0.00 - 0.60 mg/dL   PROCALCITONIN    Collection Time: 05/25/22  6:01 AM   Result Value Ref Range    Procalcitonin 0.45 (H) 0 ng/mL   PROTHROMBIN TIME + INR    Collection Time: 05/25/22  7:45 AM   Result Value Ref Range    Prothrombin time 18.8 (H) 11.9 - 14.6 sec    INR 1.6 (H) 0.9 - 1.1         Results     Procedure Component Value Units Date/Time    CULTURE, TISSUE Marget Flicker STAIN [[id number]] Collected: 05/23/22 1815    Order Status: Completed Specimen: Tissue Updated: 05/24/22 2052     Special Requests: No Special Requests        GRAM STAIN Rare WBCs seen         Few Gram Negative Rods        Culture result: PENDING    CULTURE, RESPIRATORY/SPUTUM/BRONCH Marget Flicker STAIN [[id number]] Collected: 05/19/22 1448    Order Status: Completed Specimen: Sputum Updated: 05/21/22 0813     Special Requests: No Special Requests        GRAM STAIN 1+ WBCs seen         no epithelial cells seen         No organisms seen        Culture result:       Rare Normal respiratory yasmine          CULTURE, Silvestre Colon STAIN [[id number]]  (Susceptibility) Collected: 05/15/22 1915    Order Status: Completed Specimen: Wound from Great Toe Updated: 05/18/22 0835     Special Requests: No Special Requests        GRAM STAIN       3+ Gram Positive Rods (Coryneform)                  1+ apparent Gram Negative Rods           Culture result:       Heavy Acinetobacter baumannii complex            Heavy Diphtheroids       Susceptibility      Acinetobacter baumannii complex     GEMA     Ampicillin/sulbactam ($) Susceptible     Cefepime ($$) Susceptible     Ceftazidime ($) Susceptible     Ceftriaxone ($) Intermediate     Ciprofloxacin ($) Resistant     Gentamicin ($) Susceptible     Levofloxacin ($) Resistant     Meropenem ($$) Resistant     Piperacillin/Tazobac ($) Resistant     Tobramycin ($) Susceptible     Trimeth/Sulfa Susceptible                  Linear View                   [facility][id number][facility]  (Susceptibility) Collected: 05/14/22 2715    Order Status: Completed Specimen: Wound from Ulcer Updated: 05/18/22 0841     Special Requests: No Special Requests        GRAM STAIN Few WBCs seen         2+ Gram Negative Rods               Occasional Gram Positive Cocci in pairs            Rare Gram Positive Rods        Culture result: Moderate Acinetobacter baumannii complex                  Moderate Enterococcus faecium                  Light >2 organisms - contaminated specimen.  suggest recollection Anaerobic gram negative rods Beta Lactamase Positive          Susceptibility      Acinetobacter baumannii complex     GEMA     Ampicillin/sulbactam ($) Susceptible     Cefepime ($$) Susceptible     Ceftazidime ($) Susceptible     Ceftriaxone ($) Intermediate     Ciprofloxacin ($) Resistant     Gentamicin ($) Susceptible     Levofloxacin ($) Resistant     Meropenem ($$) Resistant     Piperacillin/Tazobac ($) Resistant     Tobramycin ($) Susceptible     Trimeth/Sulfa Susceptible                  Linear View               Susceptibility      Enterococcus faecium     GEMA     Ampicillin ($) Resistant     Daptomycin ($$$$$)  See below  [1]      Linezolid ($$$$$) Susceptible     Vancomycin ($) Susceptible                 [1]  Dose Dependent  (SENSITIVITIES PERFORMED BY E-TEST)          Linear View                   CULTURE, BLOOD, PAIRED [[id number]]  (Abnormal) Collected: 05/14/22 2000    Order Status: Completed Specimen: Blood Updated: 05/17/22 1212     Special Requests: No Special Requests        Culture result:       Staphylococcus species, coagulase negative GROWING IN THE ANAEROBIC BOTTLE. No Site Indicated            (NOTE) PRELIMINARY REPORT OF GRAM POSITIVE COCCI IN CLUSTERS GROWING IN THE ANAEROBIC BOTTLE, CALLED TO AND READ BACK BY CANDI KNOWLES 5/16/22 1118 SCP. Labs:     Recent Labs     05/25/22  0601 05/24/22  0400   WBC 16.8* 15.2*   HGB 10.0* 8.5*   HCT 31.4* 27.4*    190     Recent Labs     05/25/22  0601 05/24/22  0400 05/23/22 2010 05/23/22  1635 05/23/22  0420    148* 149*   < > 147*  147*   K 3.4* 3.7 3.8   < > 4.1  4.1   * 123* 123*   < > 120*  120*   CO2 18* 18* 21  --  18*  18*   BUN 35* 45* 46*  --  49*  48*   CREA 0.72 0.91 0.91  --  1.09*  1.05*   * 142* 122*  --  238*  240*   CA 8.7 8.1* 7.8*  --  8.0*  7.9*   MG 2.2  --   --   --  2.4   PHOS 3.3  --   --   --  3.4  3.4    < > = values in this interval not displayed. Recent Labs     05/25/22  0601 05/24/22 0400 05/23/22 2010   * 325* 350*   AP 96 83 80   TBILI 0.5 0.5 0.5   TP 7.2 6.1* 5.2*   ALB 1.6* 1.4* 1.3*   GLOB 5.6* 4.7* 3.9     Recent Labs     05/25/22  0745 05/24/22 0400 05/23/22 2010   INR 1.6* 1.6* 1.6*   PTP 18.8* 19.4* 19.4*      No results for input(s): FE, TIBC, PSAT, FERR in the last 72 hours. No results found for: FOL, RBCF   Recent Labs     05/25/22  0550 05/24/22  1700   PH 7.33* 7.35   PCO2 29* 29*   PO2 121* 101*     No results for input(s): CPK, CKNDX, TROIQ in the last 72 hours.     No lab exists for component: CPKMB  Lab Results   Component Value Date/Time    Cholesterol, total 124 03/08/2022 07:40 AM    HDL Cholesterol 30 03/08/2022 07:40 AM    LDL,Direct 79 06/28/2021 12:00 AM    LDL, calculated 44.8 03/08/2022 07:40 AM    Triglyceride 202 (H) 05/16/2022 06:20 AM    CHOL/HDL Ratio 4.1 03/08/2022 07:40 AM     Lab Results   Component Value Date/Time    Glucose (POC) 140 (H) 05/25/2022 05:51 AM    Glucose (POC) 196 (H) 05/25/2022 12:03 AM    Glucose (POC) 206 (H) 05/24/2022 05:50 PM    Glucose (POC) 184 (H) 05/24/2022 11:11 AM    Glucose (POC) 154 (H) 05/24/2022 05:36 AM     Lab Results   Component Value Date/Time    Color Yellow/Straw 05/14/2022 08:08 PM    Appearance Clear 05/14/2022 08:08 PM    Specific gravity 1.014 05/14/2022 08:08 PM    pH (UA) 6.0 05/14/2022 08:08 PM    Protein 30 (A) 05/14/2022 08:08 PM    Glucose 50 (A) 05/14/2022 08:08 PM    Ketone Negative 05/14/2022 08:08 PM    Bilirubin Negative 05/14/2022 08:08 PM    Urobilinogen 0.1 05/14/2022 08:08 PM    Nitrites Negative 05/14/2022 08:08 PM    Leukocyte Esterase Trace (A) 05/14/2022 08:08 PM    Bacteria Negative 05/14/2022 08:08 PM    Bacteria Rare (A) 05/14/2022 08:08 PM    WBC 0-4 05/14/2022 08:08 PM    WBC 4 05/14/2022 08:08 PM    RBC 0-5 05/14/2022 08:08 PM    RBC 1 05/14/2022 08:08 PM         Assessment:   Acute respiratory failure on ventilator 30% O2  Sacral decubitus ulcer postdebridement  Sepsis with leukocytosis elevated procalcitonin and CRP  Left foot wound  Recent removal of left great toe and second toe  CVA with PEG tube placement  History of aspiration pneumonia  History of chronic kidney disease  CAD with ejection fraction about 20 to 25%  Hypertension  Hyperlipidemia  Anemia of chronic disease  Hypokalemia  Static post transfusion  Uncontrolled diabetes  Hepatic shock/improving  Coagulopathy  Elevated  Troponin  Bacteremia with coagulase-negative Staphylococcus  Procedure:  1. Left transmetatarsal amputation. 2.  Sacral wound excisional wound debridement 13 x 15 cm to the bone.       Plan:     Unasyn 3 g every 8 hours  Aspirin 81 mg daily  Questran 4 g twice a day  Pepcid 20 twice daily  Ferrous sulfate 325 mg twice a day  Gabapentin 300 mg twice a day  Hydralazine 50 mg 3 times a day  Metoprolol 25 daily  Replace potassium  Entresto 1 tablet twice a day  Vancomycin with pharmacy protocol  Add Lantus 10 units in the evening    GI consult for hepatic shock  Hematology consult for coagulopathy      Monitor liver function/INR  Static post transfusion    And daughter upset   By the vascular surgeon ordered PT/INR if INR less than 1.5 plan for laparoscopic loop colostomy    Overall prognosis very poor    Per Nephrology   Will change IV normal saline to half-normal saline to increase her free water intake.   Resuming free water with the PEG feeding will help in improving the serum sodium.       laparoscopic loop colostomy for fecal diversion today    Current Facility-Administered Medications:     0.9% sodium chloride infusion 250 mL, 250 mL, IntraVENous, PRN, Lydia Bob MD    furosemide (LASIX) injection 20 mg, 20 mg, IntraVENous, Q12H, Lydia Bob MD, 20 mg at 05/25/22 0802    sodium chloride (NS) flush 5-40 mL, 5-40 mL, IntraVENous, Q8H, Lydia Bob MD, 10 mL at 05/25/22 0600    sodium chloride (NS) flush 5-40 mL, 5-40 mL, IntraVENous, PRN, Lydia Bob MD    ondansetron (ZOFRAN ODT) tablet 4 mg, 4 mg, Oral, Q8H PRN **OR** ondansetron (ZOFRAN) injection 4 mg, 4 mg, IntraVENous, Q6H PRN, Lydia Bob MD    enoxaparin (LOVENOX) injection 40 mg, 40 mg, SubCUTAneous, DAILY, Lydia Bob MD, 40 mg at 05/25/22 0801    dextrose 5% infusion, 30 mL/hr, IntraVENous, CONTINUOUS, Kalpesh Lara MD, Last Rate: 30 mL/hr at 05/24/22 1409, 30 mL/hr at 05/24/22 1409    insulin lispro (HUMALOG) injection, , SubCUTAneous, Q6H, Lydia Bob MD, 1 Units at 05/25/22 0008    glucose chewable tablet 16 g, 4 Tablet, Oral, PRN, Lydia Bob MD    glucagon [facility]) injection 1 mg, 1 mg, IntraMUSCular, PRN, Lydia Bob MD    dextrose 10% infusion 0-250 mL, 0-250 mL, IntraVENous, PRN, Lydia Bob MD    hydrALAZINE (APRESOLINE) tablet 12.5 mg, 12.5 mg, Oral, TID, Nataly Mckee MD, 12.5 mg at 05/25/22 0802    sacubitriL-valsartan (ENTRESTO) 24-26 mg tablet 1 Tablet, 1 Tablet, Oral, Q12H, Paulino, Lydia Gutierrez MD, 1 Tablet at 05/25/22 0801    metoprolol succinate (TOPROL-XL) XL tablet 25 mg, 25 mg, Oral, DAILY, Lydia Bob MD, 25 mg at 05/25/22 0802    ampicillin-sulbactam (UNASYN) 3 g in 0.9% sodium chloride (MBP/ADV) 100 mL MBP, 3 g, IntraVENous, Q8H, Mary Bob MD, Last Rate: 200 mL/hr at 05/25/22 0541, 3 g at 05/25/22 0541    dextrose 10% infusion 0-250 mL, 0-250 mL, IntraVENous, PRN, Mary Bob MD, 125 mL at 05/19/22 0537    insulin glargine (LANTUS) injection 10 Units, 10 Units, SubCUTAneous, QHS, Mary Bob MD, 10 Units at 05/24/22 2102    NOREPINephrine (LEVOPHED) 32,000 mcg in dextrose 5% 250 mL (128 mcg/mL) infusion, 2 mcg/min, IntraVENous, TITRATE, Mary Bob MD, Stopped at 05/25/22 0745    propofol (DIPRIVAN) 10 mg/mL infusion, 0-50 mcg/kg/min, IntraVENous, TITRATE, Mary Bob MD, Last Rate: 14.3 mL/hr at 05/25/22 0750, 30 mcg/kg/min at 05/25/22 0750    0.9% sodium chloride infusion 250 mL, 250 mL, IntraVENous, PRN, Mary Bob MD, Last Rate: 15 mL/hr at 05/22/22 0820, Rate Verify at 05/22/22 0820    sodium hypochlorite (HALF STRENGTH DAKIN'S) 0.25% irrigation (bottle), , Topical, BID, Mary Bob MD, Given at 05/25/22 0805    Vancomycin Pharmacy Dosing, , Other, Rx Dosing/Monitoring, Mary Bob MD    sodium chloride (NS) flush 5-40 mL, 5-40 mL, IntraVENous, Q8H, Mary Bob MD, 10 mL at 05/25/22 0600    acetaminophen (TYLENOL) tablet 650 mg, 650 mg, Oral, Q6H PRN, 650 mg at 05/22/22 0556 **OR** acetaminophen (TYLENOL) suppository 650 mg, 650 mg, Rectal, Q6H PRN, Mary Bob MD, 650 mg at 05/16/22 2223    polyethylene glycol (MIRALAX) packet 17 g, 17 g, Oral, DAILY PRN, Mary Bob MD    ondansetron (ZOFRAN ODT) tablet 4 mg, 4 mg, Oral, Q8H PRN **OR** ondansetron (ZOFRAN) injection 4 mg, 4 mg, IntraVENous, Q6H PRN, Mary Bob MD    famotidine (PEPCID) tablet 20 mg, 20 mg, Oral, BID, Mary Bob MD, 20 mg at 05/25/22 4726    acidophilus-pectin, citrus tablet 2 Tablet, 2 Tablet, Oral, DAILY, Mary Bob MD, 2 Tablet at 05/25/22 5614   albuterol-ipratropium (DUO-NEB) 2.5 MG-0.5 MG/3 ML, 3 mL, Nebulization, Q6H PRN, Elham Bob MD    artificial saliva (MOUTH KOTE) 1 Spray, 1 Spray, Oral, TID, Elham Bob MD, 1 [place] at 05/23/22 2129    aspirin delayed-release tablet 81 mg, 81 mg, Oral, DAILY, Elham Bob MD, 81 mg at 05/25/22 0802    brimonidine (ALPHAGAN) 0.2 % ophthalmic solution 1 Drop, 1 Drop, Both Eyes, TID, Elham Bob MD, 1 Drop at 05/25/22 0806    cholestyramine-aspartame (QUESTRAN LIGHT) packet 4 g, 4 g, Oral, BID WITH MEALS, Elham Bob MD, 4 g at 05/25/22 0801    [Held by provider] fenofibrate nanocrystallized (TRICOR) tablet 48 mg, 48 mg, Oral, DAILY, Elham Bob MD, 48 mg at 05/19/22 8908    ferrous sulfate tablet 325 mg, 325 mg, Oral, BID, Elham Bob MD, 325 mg at 05/25/22 0802    gabapentin (NEURONTIN) capsule 300 mg, 300 mg, Oral, BID, Elham Bob MD, 300 mg at 05/25/22 0802    insulin glargine (LANTUS) injection 50 Units, 50 Units, SubCUTAneous, DAILY, Elham Bob MD, 50 Units at 05/25/22 0801    latanoprost (XALATAN) 0.005 % ophthalmic solution 1 Drop, 1 Drop, Right Eye, QPM, Elham Bob MD, 1 Drop at 05/24/22 1842    traMADoL (ULTRAM) tablet 25 mg, 25 mg, Oral, Q12H PRN, Niraj Archer MD, 25 mg at 05/18/22 9100

## 2022-05-25 NOTE — PROGRESS NOTES
IMPRESSION:   1. Acute hypoxic respiratory failure remains on the ventilator  2. Sacral decubiti ulcer with Acinetobacter and Enterococcus  3. Severe sepsis with septic shock resolved on no pressors  4. Left foot wound  5. Transaminitis continue to improve  6. Shock liver  7. Hypokalemia repleted  8. Symptomatic anemia stable after transfusion  9. Cardiomyopathy  10. Mild pulmonary edema      RECOMMENDATIONS/PLAN:   1. ICU monitoring  1. Ventilator for mechanical life support and prevent respiratory arrest with protective lung strategies  2. Maintain ventilator until surgical procedures completed  3. Pending loop colostomy possible surgery today  4. Liver enzymes improved   5. Patient underwent on 5/23 left metatarsal amputation and sacral wound debridement  6. Severe sepsis with decubiti ulcer   7. Patient is on Unasyn  8. Chest x-ray shows mild congestive changes with fluid in the minor fissure  9. Anemia hemoglobin improved after transfusion 5/20     [x] High complexity decision making was performed  [x] See my orders for details  HPI   68-year-old lady came in because of leg pain past medical history of hypertension diabetes mellitus peripheral vascular disease CVA she is bedbound she has leg wound and also has sacral decubiti ulcer and she was scheduled for laparoscopic colostomy and sacral wound debridement and amputation of the left tarsometatarsal because of wound infection but her condition got worse her liver enzyme was elevated INR was also elevated she was intubated transferred to ICU was getting vancomycin and Unasyn started on Levophed because of low blood pressure.     PMH:  has a past medical history of Anemia, Chronic kidney disease, Diabetes mellitus ([address] Hemiplegia affecting dominant side, post-stroke ([address] (05/04/2022), HTN (hypertension), Hyperkalemia, Hyperlipidemia, Ill-defined condition, Neuropathy, PAD (peripheral artery disease) ([address] Sciatica, Stroke ([address] and UTI (urinary tract infection). PSH:   has a past surgical history that includes hx other surgical (Bilateral); hx amputation (Right); hx other surgical; hx cervical fusion; hx vascular stent (Bilateral); hx vascular stent (Bilateral); and hx gi. FHX: family history includes Cancer in her mother; Diabetes in her mother; Hypertension in her mother. SHX:  reports that she has never smoked. She has never used smokeless tobacco. She reports previous alcohol use. She reports that she does not use drugs.     ALL: No Known Allergies     MEDS:   [x] Reviewed - As Below   [] Not reviewed    Current Facility-Administered Medications   Medication    0.9% sodium chloride infusion 250 mL    furosemide (LASIX) injection 20 mg    sodium chloride (NS) flush 5-40 mL    sodium chloride (NS) flush 5-40 mL    ondansetron (ZOFRAN ODT) tablet 4 mg    Or    ondansetron (ZOFRAN) injection 4 mg    enoxaparin (LOVENOX) injection 40 mg    dextrose 5% infusion    insulin lispro (HUMALOG) injection    glucose chewable tablet 16 g    glucagon (GLUCAGEN) injection 1 mg    dextrose 10% infusion 0-250 mL    hydrALAZINE (APRESOLINE) tablet 12.5 mg    sacubitriL-valsartan (ENTRESTO) 24-26 mg tablet 1 Tablet    metoprolol succinate (TOPROL-XL) XL tablet 25 mg    ampicillin-sulbactam (UNASYN) 3 g in 0.9% sodium chloride (MBP/ADV) 100 mL MBP    dextrose 10% infusion 0-250 mL    insulin glargine (LANTUS) injection 10 Units    NOREPINephrine (LEVOPHED) 32,000 mcg in dextrose 5% 250 mL (128 mcg/mL) infusion    propofol (DIPRIVAN) 10 mg/mL infusion    0.9% sodium chloride infusion 250 mL    sodium hypochlorite (HALF STRENGTH DAKIN'S) 0.25% irrigation (bottle)    Vancomycin Pharmacy Dosing    sodium chloride (NS) flush 5-40 mL    acetaminophen (TYLENOL) tablet 650 mg    Or    acetaminophen (TYLENOL) suppository 650 mg    polyethylene glycol (MIRALAX) packet 17 g    ondansetron (ZOFRAN ODT) tablet 4 mg    Or    ondansetron (ZOFRAN) injection 4 mg    famotidine (PEPCID) tablet 20 mg    acidophilus-pectin, citrus tablet 2 Tablet    albuterol-ipratropium (DUO-NEB) 2.5 MG-0.5 MG/3 ML    artificial saliva (MOUTH KOTE) 1 Spray    aspirin delayed-release tablet 81 mg    brimonidine (ALPHAGAN) 0.2 % ophthalmic solution 1 Drop    cholestyramine-aspartame (QUESTRAN LIGHT) packet 4 g    [Held by provider] fenofibrate nanocrystallized (TRICOR) tablet 48 mg    ferrous sulfate tablet 325 mg    gabapentin (NEURONTIN) capsule 300 mg    insulin glargine (LANTUS) injection 50 Units    latanoprost (XALATAN) 0.005 % ophthalmic solution 1 Drop    traMADoL (ULTRAM) tablet 25 mg      MAR reviewed and pertinent medications noted or modified as needed   Current Facility-Administered Medications   Medication    0.9% sodium chloride infusion 250 mL    furosemide (LASIX) injection 20 mg    sodium chloride (NS) flush 5-40 mL    sodium chloride (NS) flush 5-40 mL    ondansetron (ZOFRAN ODT) tablet 4 mg    Or    ondansetron (ZOFRAN) injection 4 mg    enoxaparin (LOVENOX) injection 40 mg    dextrose 5% infusion    insulin lispro (HUMALOG) injection    glucose chewable tablet 16 g    glucagon (GLUCAGEN) injection 1 mg    dextrose 10% infusion 0-250 mL    hydrALAZINE (APRESOLINE) tablet 12.5 mg    sacubitriL-valsartan (ENTRESTO) 24-26 mg tablet 1 Tablet    metoprolol succinate (TOPROL-XL) XL tablet 25 mg    ampicillin-sulbactam (UNASYN) 3 g in 0.9% sodium chloride (MBP/ADV) 100 mL MBP    dextrose 10% infusion 0-250 mL    insulin glargine (LANTUS) injection 10 Units    NOREPINephrine (LEVOPHED) 32,000 mcg in dextrose 5% 250 mL (128 mcg/mL) infusion    propofol (DIPRIVAN) 10 mg/mL infusion    0.9% sodium chloride infusion 250 mL    sodium hypochlorite (HALF STRENGTH DAKIN'S) 0.25% irrigation (bottle)    Vancomycin Pharmacy Dosing    sodium chloride (NS) flush 5-40 mL    acetaminophen (TYLENOL) tablet 650 mg    Or    acetaminophen (TYLENOL) suppository 650 mg    polyethylene glycol (MIRALAX) packet 17 g    ondansetron (ZOFRAN ODT) tablet 4 mg    Or    ondansetron (ZOFRAN) injection 4 mg    famotidine (PEPCID) tablet 20 mg    acidophilus-pectin, citrus tablet 2 Tablet    albuterol-ipratropium (DUO-NEB) 2.5 MG-0.5 MG/3 ML    artificial saliva (MOUTH KOTE) 1 Spray    aspirin delayed-release tablet 81 mg    brimonidine (ALPHAGAN) 0.2 % ophthalmic solution 1 Drop    cholestyramine-aspartame (QUESTRAN LIGHT) packet 4 g    [Held by provider] fenofibrate nanocrystallized (TRICOR) tablet 48 mg    ferrous sulfate tablet 325 mg    gabapentin (NEURONTIN) capsule 300 mg    insulin glargine (LANTUS) injection 50 Units    latanoprost (XALATAN) 0.005 % ophthalmic solution 1 Drop    traMADoL (ULTRAM) tablet 25 mg      PMH:  has a past medical history of Anemia, Chronic kidney disease, Diabetes mellitus ([address] Hemiplegia affecting dominant side, post-stroke ([address] (05/04/2022), HTN (hypertension), Hyperkalemia, Hyperlipidemia, Ill-defined condition, Neuropathy, PAD (peripheral artery disease) ([address] Sciatica, Stroke ([address] and UTI (urinary tract infection). PSH:   has a past surgical history that includes hx other surgical (Bilateral); hx amputation (Right); hx other surgical; hx cervical fusion; hx vascular stent (Bilateral); hx vascular stent (Bilateral); and hx gi. FHX: family history includes Cancer in her mother; Diabetes in her mother; Hypertension in her mother. SHX:  reports that she has never smoked. She has never used smokeless tobacco. She reports previous alcohol use. She reports that she does not use drugs. ROS:  Unable to obtain intubated on ventilator    Hemodynamics:    CO:    CI:    CVP:    SVR:   PAP Systolic:    PAP Diastolic:    PVR:    FA52:        Ventilator Settings:      Mode Rate TV Press PEEP FiO2 PIP Min.  Vent   Assist control,Volume control    500 ml    5 cm H20 30 %  36 cm H2O  7.58 l/min        Vital Signs: Telemetry:    normal sinus rhythm Intake/Output:   Visit Vitals  BP (!) 98/44 (BP 1 Location: Left lower arm, BP Patient Position: At rest)   Pulse 74   Temp 97.6 °F (36.4 °C)   Resp 12   Ht 5' 7.01\" (1.702 m)   Wt 89 kg (196 lb 3.4 oz)   SpO2 100%   Breastfeeding No   BMI 30.72 kg/m²       Temp (24hrs), Av.1 °F (36.2 °C), Min:95.8 °F (35.4 °C), Max:97.6 °F (36.4 °C)        O2 Device: Ventilator O2 Flow Rate (L/min): 1 l/min       Wt Readings from Last 4 Encounters:   22 89 kg (196 lb 3.4 oz)   04/10/22 86.6 kg (191 lb)   22 82.2 kg (181 lb 3.5 oz)   20 84.4 kg (186 lb)          Intake/Output Summary (Last 24 hours) at 2022 0837  Last data filed at 2022 3180  Gross per 24 hour   Intake 1345.56 ml   Output 2151 ml   Net -805.44 ml       Last shift:      No intake/output data recorded. Last 3 shifts:  1901 -  0700  In: 4065.2 [I.V.:2855.2]  Out: 5807 [Urine:3150]       Physical Exam:     General: intubated on vent unresponsive on propofol  HEENT: NCAT,  Eyes: anicteric; conjunctiva clear doll's eye reflex present  Neck: no nodes, no cuff leak, trach midline; no accessory MM use. Neck veins obscured by obesity but seems slightly engorged  Chest: no deformity,   Cardiac: R regular; no murmur;   Lungs: distant breath sounds; no wheezes rales in the bases  Abd: soft, NT, hypoactive BS  Ext: Ace edema; no joint swelling;  No clubbing  :clear urine  Neuro: Positive on propofol doll's eye reflex present flaccid extremities  Psych-  unable to assess  Skin: warm, dry, no cyanosis;   Pulses: Brachial and radial pulses intact  Capillary: Normal capillary refill      DATA:    MAR reviewed and pertinent medications noted or modified as needed  MEDS:   Current Facility-Administered Medications   Medication    0.9% sodium chloride infusion 250 mL    furosemide (LASIX) injection 20 mg    sodium chloride (NS) flush 5-40 mL    sodium chloride (NS) flush 5-40 mL    ondansetron (ZOFRAN ODT) tablet 4 mg    Or    ondansetron (ZOFRAN) injection 4 mg    enoxaparin (LOVENOX) injection 40 mg    dextrose 5% infusion    insulin lispro (HUMALOG) injection    glucose chewable tablet 16 g    glucagon (GLUCAGEN) injection 1 mg    dextrose 10% infusion 0-250 mL    hydrALAZINE (APRESOLINE) tablet 12.5 mg    sacubitriL-valsartan (ENTRESTO) 24-26 mg tablet 1 Tablet    metoprolol succinate (TOPROL-XL) XL tablet 25 mg    ampicillin-sulbactam (UNASYN) 3 g in 0.9% sodium chloride (MBP/ADV) 100 mL MBP    dextrose 10% infusion 0-250 mL    insulin glargine (LANTUS) injection 10 Units    NOREPINephrine (LEVOPHED) 32,000 mcg in dextrose 5% 250 mL (128 mcg/mL) infusion    propofol (DIPRIVAN) 10 mg/mL infusion    0.9% sodium chloride infusion 250 mL    sodium hypochlorite (HALF STRENGTH DAKIN'S) 0.25% irrigation (bottle)    Vancomycin Pharmacy Dosing    sodium chloride (NS) flush 5-40 mL    acetaminophen (TYLENOL) tablet 650 mg    Or    acetaminophen (TYLENOL) suppository 650 mg    polyethylene glycol (MIRALAX) packet 17 g    ondansetron (ZOFRAN ODT) tablet 4 mg    Or    ondansetron (ZOFRAN) injection 4 mg    famotidine (PEPCID) tablet 20 mg    acidophilus-pectin, citrus tablet 2 Tablet    albuterol-ipratropium (DUO-NEB) 2.5 MG-0.5 MG/3 ML    artificial saliva (MOUTH KOTE) 1 Spray    aspirin delayed-release tablet 81 mg    brimonidine (ALPHAGAN) 0.2 % ophthalmic solution 1 Drop    cholestyramine-aspartame (QUESTRAN LIGHT) packet 4 g    [Held by provider] fenofibrate nanocrystallized (TRICOR) tablet 48 mg    ferrous sulfate tablet 325 mg    gabapentin (NEURONTIN) capsule 300 mg    insulin glargine (LANTUS) injection 50 Units    latanoprost (XALATAN) 0.005 % ophthalmic solution 1 Drop    traMADoL (ULTRAM) tablet 25 mg        Labs:    Recent Labs     05/25/22  0745 05/25/22  0601 05/24/22  0400 05/23/22 2010 05/23/22  1635 05/23/22  1615 05/23/22  0620 05/23/22  0420   WBC  --  16.8* 15.2* --   --   --   --  17.2*   HGB  --  10.0* 8.5*  --  8.9*  --    < > 9.0*   PLT  --  289 190  --   --   --   --  192   INR 1.6*  --  1.6* 1.6*  --    < >   < >  --     < > = values in this interval not displayed. Recent Labs     22  0601 22  0400 22  1635 22  0420    148* 149*   < > 147*  147*   K 3.4* 3.7 3.8   < > 4.1  4.1   * 123* 123*   < > 120*  120*   CO2 18* 18* 21  --  18*  18*   * 142* 122*  --  238*  240*   BUN 35* 45* 46*  --  49*  48*   CREA 0.72 0.91 0.91  --  1.09*  1.05*   CA 8.7 8.1* 7.8*  --  8.0*  7.9*   MG 2.2  --   --   --  2.4   PHOS 3.3  --   --   --  3.4  3.4   ALB 1.6* 1.4* 1.3*  --  1.5*  1.5*   * 325* 350*  --  525*    < > = values in this interval not displayed. Recent Labs     22  0550 22  1700 22  2220   PH 7.33* 7.35 7.35   PCO2 29* 29* 30*   PO2 121* 101* 85   HCO3 17* 18* 18*   FIO2 30.0 30 30.0    AC 12  PEEP 5 FiO2 30%   echo ejection fraction 20% mild mitral regurg left atrium 3.5 cm RVSP 35  , 1313, 1361    Lab Results   Component Value Date/Time    Culture result: PENDING 2022 06:15 PM    Culture result: Rare Normal respiratory yasmine 2022 02:48 PM    Culture result: Heavy Acinetobacter baumannii complex 05/15/2022 07:15 PM    Culture result: Heavy Diphtheroids 05/15/2022 07:15 PM     Lab Results   Component Value Date/Time    TSH 2.880 2016 10:13 AM        Imagin/22 chest x-ray with ET tube in place hazy accentuated basilar markings with small amount of fluid in the minor fissure  Results from Hospital Encounter encounter on 22    XR CHEST PORT    Narrative  Chest single view. Comparison single view chest May 24, 2022. ET tube 7.5 cm above the jennifer. Combination basilar atelectasis and moderate volume dependent pleural effusions. Cardiac and mediastinal structures unchanged. No pneumothorax.   Cervical neck hardware. Results from [place] encounter on 05/14/22    CT ABD PELV WO CONT    Narrative  CT ABD PELV WO CONT    Technique: Noncontrasted CT scan of the abdomen and pelvis was performed  utilizing transaxial images obtained from the dome of the diaphragm to the pubic  symphysis. Coronal and sagittal reconstructions were generated. Dose Reduction:  All CT scans at this facility are performed using dose reduction optimization  techniques as appropriate to a performed exam including the following: Automated  exposure control, adjustments of the mA and/or kV according to patient size, or  use of iterative reconstruction technique. Comparison:  2/25/2022. Findings:  Small-moderate pleural effusions are present with bibasilar  atelectasis. Atherosclerotic calcifications are again evident including coronary  artery calcifications. Gastrostomy tube tip in the gastric antrum. The liver, spleen, pancreas, and  adrenal glands are unremarkable for noncontrasted technique. Stable lobulated  kidneys. Negative for hydronephrosis. Gallbladder is unremarkable. No biliary  duct dilatation apparent. The abdominal aorta is normal in caliber. There is no evidence of bowel obstruction. Small volume ascites has developed. The urinary bladder is decompressed by a Bell catheter. The uterus is  unremarkable. The appendix is normal.    There is mild body wall edema. No suspicious lytic or blastic lesion evident. Impression  Third spacing of fluids with small volume ascites, small-moderate  pleural effusions, and mild body wall edema. Bibasilar atelectasis.       5/20 intubated on ventilator we will continue current vent settings patient is supposed to go for surgery because of transaminitis elevated liver enzyme we will wait as per surgeon for sacral wound debridement and diverting loop colostomy off Levophed, improvement in AST and ALT  5/21 continue with the current vent settings ABG acceptable liver enzyme improving awaiting for the surgery  5/22 maintain ventilator as is in anticipation of surgery.   Renal function improved mild congestion on chest x-ray we will give 1 dose Lasix and potassium  5/23 patient is going for surgery today we will leave ventilator as it is INR is 1.5  5/25 patient remained on ventilator intubated doing well, patient received vitamin K fresh frozen plasma schedule for laparoscopic loop colostomy possible today  Time of care 30 minutes

## 2022-05-25 NOTE — PROGRESS NOTES
Hematology and Oncology Progress Note    Patient: Diego Sanders MRN: [id number]  SSN: [id number]    YOB: 1945  Age: 78 y.o. Sex: female      Admit Date: 5/14/2022    LOS: 11 days     Chief Complaint: Patient is intubated and unresponsive    Subjective:     Patient is intubated and unresponsive. Patient has been no bleeding from any IV site. Objective:     Vitals:    05/25/22 0900 05/25/22 0955 05/25/22 1056 05/25/22 1121   BP: (!) 93/55 (!) 88/51     Pulse: 64 (!) 56  78   Resp: 12 12  12   Temp:  97.5 °F (36.4 °C)     SpO2: 100% 100%  100%   Weight:   90.8 kg (200 lb 2.8 oz)    Height:              Physical Exam:   Constitutional: -American female. Unresponsive and intubated  Eyes: Conjunctiva is pale  Respiratory: Intubated  Cardiovascular: Normal sinus rhythm. Neurologic: Unresponsive    Lab/Data Review:  Results for Ebb Chi (MRN [id number]) as of 5/25/2022 18:43   Ref. Range 5/24/2022 04:00 5/25/2022 06:01   WBC Latest Ref Range: 3.6 - 11.0 K/uL 15.2 (H) 16.8 (H)   NRBC Latest Ref Range: 0.0  WBC 0.3 (H) 0.2 (H)   RBC Latest Ref Range: 3.80 - 5.20 M/uL 3.00 (L) 3.57 (L)   HGB Latest Ref Range: 11.5 - 16.0 g/dL 8.5 (L) 10.0 (L)   HCT Latest Ref Range: 35.0 - 47.0 % 27.4 (L) 31.4 (L)   MCV Latest Ref Range: 80.0 - 99.0 FL 91.3 88.0   MCH Latest Ref Range: 26.0 - 34.0 PG 28.3 28.0   MCHC Latest Ref Range: 30.0 - 36.5 g/dL 31.0 31.8   RDW Latest Ref Range: 11.5 - 14.5 % 17.4 (H) 17.0 (H)   PLATELET Latest Ref Range: 150 - 400 K/uL 190 289   Results for Ebb Chi (MRN [id number]) as of 5/25/2022 18:43   Ref. Range 5/24/2022 04:00 5/25/2022 07:45 5/25/2022 11:15   INR Latest Ref Range: 0.9 - 1.1   1.6 (H) 1.6 (H) 1.5 (H)   Prothrombin time Latest Ref Range: 11.9 - 14.6 sec 19.4 (H) 18.8 (H) 17.8 (H)       Assessment and plan:     89-year-old female  admitted with severe decubitus ulcer related infection/sepsis.   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    2) patient is currently in shock secondary to sepsis/cardiogenic shock.  -Patient's LFTs are now within normal limits     3) coagulopathy: Appears to be secondary to liver failure.  -Patient had vitamin K.  -No evidence of DIC. She has normal fibrinogen and PTT. -Patient's INR is 1.5 today. I will give 2 more doses of vitamin K.  -Patient's platelet counts are normal at 289,000. Patient's thrombocytopenia has resolved.    4) anemia: Acute on chronic anemia.  Likely secondary to CKD and some bleeding from ongoing wound problems.  -Patient has received packed RBC transfusion earlier this admission.  -Hemoglobin has improved to 10 today. This dictation was done by dragon, computer voice recognition software. Often unanticipated grammatical, syntax, phones and other interpretive errors are inadvertently transcribed. Please excuse errors that have escaped final proofreading.        Signed By: Perlita Coreas MD     May 25, 2022

## 2022-05-25 NOTE — PROGRESS NOTES
Progress Note    Patient: Kati Burr MRN: [id number]  SSN: [id number]    YOB: 1945  Age: 78 y.o. Sex: female      Admit Date: 5/14/2022    LOS: 11 days     Subjective:   Patient followed for sacral wound infection with repeat culture growing Acinetobacter and Enterococci. Left foot wound also grew Acinetobacter, now status post left TMA. WBC procal and CRP remain elevated. She is currently on Vancomycin and Unasyn. Objective:     Vitals:    05/25/22 0900 05/25/22 0955 05/25/22 1056 05/25/22 1121   BP: (!) 93/55 (!) 88/51     Pulse: 64 (!) 56  78   Resp: 12 12  12   Temp:  97.5 °F (36.4 °C)     SpO2: 100% 100%  100%   Weight:   200 lb 2.8 oz (90.8 kg)    Height:            Intake and Output:  Current Shift: 05/25 0701 - 05/25 1900  In: 280.2 [I.V.:9.4]  Out: -   Last three shifts: 05/23 1901 - 05/25 0700  In: 4822.3 [I.V.:3612.3]  Out: 3151 [Urine:3150]    Physical Exam:    Vitals and nursing note reviewed. Constitutional:       General: She is not in acute distress. Appearance: She is ill-appearing. HENT: ET Tube  Eyes: closed   Cardiovascular:      Rate and Rhythm: Normal rate and regular rhythm. Heart sounds: No murmur heard. Pulmonary: diffuse rhonchi     Effort: Pulmonary effort is normal.      Breath sounds: Normal breath sounds. Abdominal:      General: Bowel sounds are normal.      Palpations: Abdomen is soft. Tenderness: There is no abdominal tenderness. Comments: PEG site unremarkable   Genitourinary:     Comments: Indwelling Bell with clear urine             Musculoskeletal:      Cervical back: Neck supple. Right lower leg: No edema. Left lower leg: No edema. Comments: Left foot TMA with bulky gauze dresing   Skin:     Findings: No rash.              Comments: Stage 3 sacral wound   Neurological: intubated   Psychiatric:  intubated      Lab/Data Review:     WBC 16,800  /35    Procal 0.45 <0.71 <1.02 <1.72 <2.32 <0.59 <0.57  CRP 15.00 <13.60 < 13.90 <17.10 <18.00 <18.90 <17.90    Blood cultures (5/14) Coagulase negative Staphylococci  Sacral wound (5/14) Acinetobacter baumannii sensitive to Unasyn, Cefepime, Ceftazidime and Enterococcus faecium  Sacral wound (5/23) Few Gram negtive rods  Left great toe (5/15) Acinetobacter baumannii  Sputum culture (5/19) Rare normal respiratory yasmine FINAL  Assessment:     Active Problems:    Sacral ulcer ([address] (5/14/2022)    1. Sacral wound infection secondary now to Acinetobacter baumannii and Enterococcus faecium, on Vancomycin and Unasyn, status post excisional wound debridement to the bone. 2. Sepsis with persistent leukocytosis, elevated procal and CRP  3. Left foot wound, culture growing Acinetobacter baumannii, status post TMA  4. Possible ischemic hepatitiis with transaminitis following hypotensive episode, resolving  5. Positive blood cultures for Coagulase negative Staphylococci, probable contaminant  6. Hepatitis C antibody positive, resolved (negative HCV viral level)    Comment:  WBC increasing today along with CRP. Will re-interrogate urine. Gram negative rods on latest tissue culture may be resistant to Unasyn. Plan:   1. Continue IV Unasyn and Vancomycin (for E. Faecium)  2. In am, repeat CBC, CRP, procal  3. Repeat urinalysis  4.  Follow-up  sacral wound culture    Signed By: Precious Millard MD     May 25, 2022

## 2022-05-25 NOTE — PROGRESS NOTES
General Daily Progress Note          Patient Name:   Maddy Enriquez       YOB: 1945       Age:  78 y.o. 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 Cipro    Admitted again yesterday concerning for worsening of sacral ulcer    During last admission patient was seen by infectious disease and also seen by the vascular surgeon patient had debridement of wound during the last admission    Patient seen by the infectious disease yesterday    Sacral wound infection recent cultures growing Pseudomonas resistant to Zosyn and meropenem    5/17    Patient alert awake not in distress  Patient was seen by the vascular surgeon    Vascular surgery planned for laparoscopic loop colostomy placement and sacral debridement then wound vacuum  Also try to close the wound on the left foot with TMA    5/18    Resting in the bed not in any distress  Blood sugars running high    Seen by infectious disease continue vancomycin and start on Unasyn    5/19    And went to the OR intubated because of elevated LFT and elevated INR  Surgery was canceled    On ventilator 40% O2  Propofol drip    5/20    Patient on ventilator with 30% O2  On propofol drip    Hemoglobin dropped to 6.8    Patient's daughter at the bedside    5/20    Patient on ventilator 30% O2  On propofol drip  Getting PEG tube feeding    5/21    Patient still on ventilator 30% O2  On propofol drip  Liver Function improving    5/22  On ventilator with 30% O2 on propofol drip    5/24  Awaiting tissue culture results.    On ventilator with 30% O2 on propofol drip  Left transmetatarsal amputation and Sacral wound excisional wound debridement 13 x 15 cm to the bone completed yesterday. CXR: Hazy mid and lower lung reticular markings.  Dependent small to moderate volume,  right greater than left pleural effusions  Remarkable labs   WBC: 15.2   Hgb: 8.5   Na; 148  CRP: 13.60   Procal: 0.71      5/25    Patient on ventilator with 30% O2  Seen by the vascular surgeon and plan for surgery today    Objective:     Visit Vitals  /60 (BP 1 Location: Left lower arm, BP Patient Position: At rest)   Pulse 74   Temp 97.6 °F (36.4 °C)   Resp 12   Ht 5' 7.01\" (1.702 m)   Wt 89 kg (196 lb 3.4 oz)   SpO2 100%   Breastfeeding No   BMI 30.72 kg/m²        Recent Results (from the past 24 hour(s))   GLUCOSE, POC    Collection Time: 05/24/22 11:11 AM   Result Value Ref Range    Glucose (POC) 184 (H) 65 - 117 mg/dL    Performed by Nayan Weiss    BLOOD GAS, ARTERIAL    Collection Time: 05/24/22  5:00 PM   Result Value Ref Range    pH 7.35 7.35 - 7.45      PCO2 29 (L) 35 - 45 mmHg    PO2 101 (H) 75 - 100 mmHg    O2 SAT 99 >95 %    BICARBONATE 18 (L) 22 - 26 mmol/L    BASE DEFICIT 8.5 (H) 0 - 2 mmol/L    O2 METHOD VENT      FIO2 30 %    MODE Assist Control/Volume Control      Tidal volume 500      SET RATE 12      EPAP/CPAP/PEEP 5      SITE Right Radial     GLUCOSE, POC    Collection Time: 05/24/22  5:50 PM   Result Value Ref Range    Glucose (POC) 206 (H) 65 - 117 mg/dL    Performed by [address] [place] [address], POC    Collection Time: 05/25/22 12:03 AM   Result Value Ref Range    Glucose (POC) 196 (H) 65 - 117 mg/dL    Performed by Ron Zaldivar    PLASMA, ALLOCATE    Collection Time: 05/25/22  5:45 AM   Result Value Ref Range    Unit number [id number]     Blood component type FP 24h,Thaw1     Unit division 00     Status of unit [address] to transfuse    BLOOD GAS, ARTERIAL    Collection Time: 05/25/22  5:50 AM   Result Value Ref Range    pH 7.33 (L) 7.35 - 7.45      PCO2 29 (L) 35 - 45 mmHg    PO2 121 (H) 75 - 100 mmHg    O2 SAT 99 >95 %    BICARBONATE 17 (L) 22 - 26 mmol/L    BASE DEFICIT 9.4 (H) 0 - 2 mmol/L    O2 METHOD VENT      FIO2 30.0 %    MODE Assist Control/Volume Control      Tidal volume 500      SET RATE 12      SITE Arterial Line     GLUCOSE, POC    Collection Time: 05/25/22  5:51 AM   Result Value Ref Range    Glucose (POC) 140 (H) 65 - 117 mg/dL    Performed by Noé Bolton    MAGNESIUM    Collection Time: 05/25/22  6:01 AM   Result Value Ref Range    Magnesium 2.2 1.6 - 2.4 mg/dL   PHOSPHORUS    Collection Time: 05/25/22  6:01 AM   Result Value Ref Range    Phosphorus 3.3 2.6 - 4.7 mg/dL   VANCOMYCIN, RANDOM    Collection Time: 05/25/22  6:01 AM   Result Value Ref Range    Vancomycin, random 12.1 ug/mL   CBC WITH AUTOMATED DIFF    Collection Time: 05/25/22  6:01 AM   Result Value Ref Range    WBC 16.8 (H) 3.6 - 11.0 K/uL    RBC 3.57 (L) 3.80 - 5.20 M/uL    HGB 10.0 (L) 11.5 - 16.0 g/dL    HCT 31.4 (L) 35.0 - 47.0 %    MCV 88.0 80.0 - 99.0 FL    MCH 28.0 26.0 - 34.0 PG    MCHC 31.8 30.0 - 36.5 g/dL    RDW 17.0 (H) 11.5 - 14.5 %    PLATELET 036 558 - 141 K/uL    MPV 11.1 8.9 - 12.9 FL    NRBC 0.2 (H) 0.0  WBC    ABSOLUTE NRBC 0.04 (H) 0.00 - 0.01 K/uL    NEUTROPHILS 80 (H) 32 - 75 %    LYMPHOCYTES 10 (L) 12 - 49 %    MONOCYTES 5 5 - 13 %    EOSINOPHILS 3 0 - 7 %    BASOPHILS 1 0 - 1 %    IMMATURE GRANULOCYTES 1 (H) 0 - 0.5 %    ABS. NEUTROPHILS 13.4 (H) 1.8 - 8.0 K/UL    ABS. LYMPHOCYTES 1.7 0.8 - 3.5 K/UL    ABS. MONOCYTES 0.8 0.0 - 1.0 K/UL    ABS. EOSINOPHILS 0.5 (H) 0.0 - 0.4 K/UL    ABS. BASOPHILS 0.1 0.0 - 0.1 K/UL    ABS. IMM.  GRANS. 0.2 (H) 0.00 - 0.04 K/UL    DF AUTOMATED     METABOLIC PANEL, COMPREHENSIVE    Collection Time: 05/25/22  6:01 AM   Result Value Ref Range    Sodium 145 136 - 145 mmol/L    Potassium 3.4 (L) 3.5 - 5.1 mmol/L    Chloride 119 (H) 97 - 108 mmol/L    CO2 18 (L) 21 - 32 mmol/L    Anion gap 8 5 - 15 mmol/L    Glucose 156 (H) 65 - 100 mg/dL    BUN 35 (H) 6 - 20 mg/dL    Creatinine 0.72 0.55 - 1.02 mg/dL    BUN/Creatinine ratio 49 (H) 12 - 20      GFR est AA >60 >60 ml/min/1.73m2    GFR est non-AA >60 >60 ml/min/1.73m2    Calcium 8.7 8.5 - 10.1 mg/dL    Bilirubin, total 0.5 0.2 - 1.0 mg/dL    AST (SGOT) 28 15 - 37 U/L    ALT (SGPT) 276 (H) 12 - 78 U/L    Alk. phosphatase 96 45 - 117 U/L    Protein, total 7.2 6.4 - 8.2 g/dL    Albumin 1.6 (L) 3.5 - 5.0 g/dL    Globulin 5.6 (H) 2.0 - 4.0 g/dL    A-G Ratio 0.3 (L) 1.1 - 2.2     C REACTIVE PROTEIN, QT    Collection Time: 05/25/22  6:01 AM   Result Value Ref Range    C-Reactive protein 15.00 (H) 0.00 - 0.60 mg/dL   PROCALCITONIN    Collection Time: 05/25/22  6:01 AM   Result Value Ref Range    Procalcitonin 0.45 (H) 0 ng/mL   PROTHROMBIN TIME + INR    Collection Time: 05/25/22  7:45 AM   Result Value Ref Range    Prothrombin time 18.8 (H) 11.9 - 14.6 sec    INR 1.6 (H) 0.9 - 1.1       [unfilled]      Review of Systems    Not a good historian e. Physical Exam:      Constitutional: On ventilator  HENT:   Head: Normocephalic and atraumatic. Eyes: Pupils are equal, round, and reactive to light. EOM are normal.   Cardiovascular: Normal rate, regular rhythm and normal heart sounds. Pulmonary/Chest: Breath sounds normal. No wheezes. No rales. Exhibits no tenderness. Abdominal: Soft. Bowel sounds are normal. There is no abdominal tenderness. There is no rebound and no guarding. Musculoskeletal: Normal range of motion. Neurological: On ventilator   skin sacral decubitus ulcer and left foot wound right big toe amputation    XR CHEST PORT   Final Result      CT ABD PELV WO CONT   Final Result   Third spacing of fluids with small volume ascites, small-moderate   pleural effusions, and mild body wall edema. Bibasilar atelectasis. XR CHEST PORT   Final Result   Similar findings compared to single view chest 1 day earlier.       XR CHEST PORT   Final Result   Findings/IMPRESSION: Stable appearance of endotracheal tube. Stable mild   enlargement of cardiomediastinal silhouette. Central vascular congestion with   bilateral perihilar and basilar opacities, consistent with edema and/or   pneumonia, right greater than left. Possible right pleural effusion. No   pneumothorax. XR CHEST PORT   Final Result   Bibasilar opacities, possibly increased on the right. XR CHEST PORT   Final Result   No significant change allowing for difference in technique and   positioning. Single portable AP view compared to yesterday. Suboptimal inspiratory film   compromises visualization of the lower lung fields. Monitoring equipment   overlies the body. The tip of the tube is approximately 3 cm above the jennifer. Mild apparent cardiomegaly. Left heart border and left diaphragm obscured. Hazy airspace opacification both lung bases may be a combination of atelectasis,   pneumonia, or edema. No large effusion. Negative for pneumothorax. XR CHEST PORT   Final Result   No significant change allowing for difference in technique and   positioning. Single portable AP view compared to yesterday. Rotation to the right. Monitoring   equipment overlies the body. Suboptimal inspiratory film compromises   visualization of the lower lung fields. Mild cardiomegaly. The tip of the ET tube is approximately 3 cm above the jennifer. Mild basal interstitial edema. No new consolidation. No pleural effusion or   pneumothorax. XR CHEST PORT   Final Result   1. The endotracheal tube is in satisfactory position. 2.  There has been a partial interval resolution in the pulmonary interstitial   edema pattern. XR CHEST PORT   Final Result   Ill-defined haziness in the mid to lower lung zones suspect for mild   atelectatic changes and/or interstitial fluid or pleural fluid. US ABD LTD   Final Result   1. Question pericholecystic fluid. No identified gallstones or sludge. 2. Right pleural effusion. 3. Increased right renal echotexture for medical renal disease. XR CHEST PORT   Final Result   Intubation. Findings suggesting hydrostatic edema. XR CHEST PORT   Final Result   No evidence of an acute cardiopulmonary process.       XR CHEST PORT    (Results Pending)        Recent Results (from the past 24 hour(s))   GLUCOSE, POC    Collection Time: 05/24/22 11:11 AM   Result Value Ref Range    Glucose (POC) 184 (H) 65 - 117 mg/dL    Performed by Irving Taylor, ARTERIAL    Collection Time: 05/24/22  5:00 PM   Result Value Ref Range    pH 7.35 7.35 - 7.45      PCO2 29 (L) 35 - 45 mmHg    PO2 101 (H) 75 - 100 mmHg    O2 SAT 99 >95 %    BICARBONATE 18 (L) 22 - 26 mmol/L    BASE DEFICIT 8.5 (H) 0 - 2 mmol/L    O2 METHOD VENT      FIO2 30 %    MODE Assist Control/Volume Control      Tidal volume 500      SET RATE 12      EPAP/CPAP/PEEP 5      SITE Right Radial     GLUCOSE, POC    Collection Time: 05/24/22  5:50 PM   Result Value Ref Range    Glucose (POC) 206 (H) 65 - 117 mg/dL    Performed by Ximena Lucas    GLUCOSE, POC    Collection Time: 05/25/22 12:03 AM   Result Value Ref Range    Glucose (POC) 196 (H) 65 - 117 mg/dL    Performed by Awilda GIBBS, ALLOCATE    Collection Time: 05/25/22  5:45 AM   Result Value Ref Range    Unit number [id number]     Blood component type FP 24h,Thaw1     Unit division 00     Status of unit Issued     TRANSFUSION STATUS Ok to transfuse    BLOOD GAS, ARTERIAL    Collection Time: 05/25/22  5:50 AM   Result Value Ref Range    pH 7.33 (L) 7.35 - 7.45      PCO2 29 (L) 35 - 45 mmHg    PO2 121 (H) 75 - 100 mmHg    O2 SAT 99 >95 %    BICARBONATE 17 (L) 22 - 26 mmol/L    BASE DEFICIT 9.4 (H) 0 - 2 mmol/L    O2 METHOD VENT      FIO2 30.0 %    MODE Assist Control/Volume Control      Tidal volume 500      SET RATE 12      SITE Arterial Line     GLUCOSE, POC    Collection Time: 05/25/22  5:51 AM   Result Value Ref Range    Glucose (POC) 140 (H) 65 - 117 mg/dL    Performed by Krys Lacy    MAGNESIUM    Collection Time: 05/25/22  6:01 AM   Result Value Ref Range    Magnesium 2.2 1.6 - 2.4 mg/dL   PHOSPHORUS    Collection Time: 05/25/22  6:01 AM   Result Value Ref Range    Phosphorus 3.3 2.6 - 4.7 mg/dL   VANCOMYCIN, RANDOM    Collection Time: 05/25/22  6:01 AM   Result Value Ref Range    Vancomycin, random 12.1 ug/mL   CBC WITH AUTOMATED DIFF    Collection Time: 05/25/22  6:01 AM   Result Value Ref Range    WBC 16.8 (H) 3.6 - 11.0 K/uL    RBC 3.57 (L) 3.80 - 5.20 M/uL    HGB 10.0 (L) 11.5 - 16.0 g/dL    HCT 31.4 (L) 35.0 - 47.0 %    MCV 88.0 80.0 - 99.0 FL    MCH 28.0 26.0 - 34.0 PG    MCHC 31.8 30.0 - 36.5 g/dL    RDW 17.0 (H) 11.5 - 14.5 %    PLATELET 084 132 - 767 K/uL    MPV 11.1 8.9 - 12.9 FL    NRBC 0.2 (H) 0.0  WBC    ABSOLUTE NRBC 0.04 (H) 0.00 - 0.01 K/uL    NEUTROPHILS 80 (H) 32 - 75 %    LYMPHOCYTES 10 (L) 12 - 49 %    MONOCYTES 5 5 - 13 %    EOSINOPHILS 3 0 - 7 %    BASOPHILS 1 0 - 1 %    IMMATURE GRANULOCYTES 1 (H) 0 - 0.5 %    ABS. NEUTROPHILS 13.4 (H) 1.8 - 8.0 K/UL    ABS. LYMPHOCYTES 1.7 0.8 - 3.5 K/UL    ABS. MONOCYTES 0.8 0.0 - 1.0 K/UL    ABS. EOSINOPHILS 0.5 (H) 0.0 - 0.4 K/UL    ABS. BASOPHILS 0.1 0.0 - 0.1 K/UL    ABS. IMM. GRANS. 0.2 (H) 0.00 - 0.04 K/UL    DF AUTOMATED     METABOLIC PANEL, COMPREHENSIVE    Collection Time: 05/25/22  6:01 AM   Result Value Ref Range    Sodium 145 136 - 145 mmol/L    Potassium 3.4 (L) 3.5 - 5.1 mmol/L    Chloride 119 (H) 97 - 108 mmol/L    CO2 18 (L) 21 - 32 mmol/L    Anion gap 8 5 - 15 mmol/L    Glucose 156 (H) 65 - 100 mg/dL    BUN 35 (H) 6 - 20 mg/dL    Creatinine 0.72 0.55 - 1.02 mg/dL    BUN/Creatinine ratio 49 (H) 12 - 20      GFR est AA >60 >60 ml/min/1.73m2    GFR est non-AA >60 >60 ml/min/1.73m2    Calcium 8.7 8.5 - 10.1 mg/dL    Bilirubin, total 0.5 0.2 - 1.0 mg/dL    AST (SGOT) 28 15 - 37 U/L    ALT (SGPT) 276 (H) 12 - 78 U/L    Alk.  phosphatase 96 45 - 117 U/L    Protein, total 7.2 6.4 - 8.2 g/dL    Albumin 1.6 (L) 3.5 - 5.0 g/dL    Globulin 5.6 (H) 2.0 - 4.0 g/dL    A-G Ratio 0.3 (L) 1.1 - 2.2     C REACTIVE PROTEIN, QT    Collection Time: 05/25/22  6:01 AM   Result Value Ref Range    C-Reactive protein 15.00 (H) 0.00 - 0.60 mg/dL   PROCALCITONIN    Collection Time: 05/25/22  6:01 AM   Result Value Ref Range    Procalcitonin 0.45 (H) 0 ng/mL   PROTHROMBIN TIME + INR    Collection Time: 05/25/22  7:45 AM   Result Value Ref Range    Prothrombin time 18.8 (H) 11.9 - 14.6 sec    INR 1.6 (H) 0.9 - 1.1         Results     Procedure Component Value Units Date/Time    CULTURE, TISSUE Nasreen Dame STAIN [[id number]] Collected: 05/23/22 1815    Order Status: Completed Specimen: Tissue Updated: 05/24/22 2052     Special Requests: No Special Requests        GRAM STAIN Rare WBCs seen         Few Gram Negative Rods        Culture result: PENDING    CULTURE, RESPIRATORY/SPUTUM/BRONCH Nasreen Dame STAIN [[id number]] Collected: 05/19/22 1448    Order Status: Completed Specimen: Sputum Updated: 05/21/22 0813     Special Requests: No Special Requests        GRAM STAIN 1+ WBCs seen         no epithelial cells seen         No organisms seen        Culture result:       Rare Normal respiratory yasmine          CULTURE, WOUND Nasreen Dame STAIN [[id number]]  (Susceptibility) Collected: 05/15/22 1915    Order Status: Completed Specimen: Wound from Great Toe Updated: 05/18/22 0835     Special Requests: No Special Requests        GRAM STAIN       3+ Gram Positive Rods (Coryneform)                  1+ apparent Gram Negative Rods           Culture result:       Heavy Acinetobacter baumannii complex            Heavy Diphtheroids       Susceptibility      Acinetobacter baumannii complex     GEMA     Ampicillin/sulbactam ($) Susceptible     Cefepime ($$) Susceptible     Ceftazidime ($) Susceptible     Ceftriaxone ($) Intermediate     Ciprofloxacin ($) Resistant     Gentamicin ($) Susceptible     Levofloxacin ($) Resistant     Meropenem ($$) Resistant     Piperacillin/Tazobac ($) Resistant     Tobramycin ($) Susceptible     Trimeth/Sulfa Susceptible                  Linear View                   CULTURE, Alpheus May STAIN [[id number]]  (Susceptibility) Collected: 05/14/22 5040    Order Status: Completed Specimen: Wound from Ulcer Updated: 05/18/22 0818     Special Requests: No Special Requests        GRAM STAIN Few WBCs seen         2+ Gram Negative Rods               Occasional Gram Positive Cocci in pairs            Rare Gram Positive Rods        Culture result: Moderate Acinetobacter baumannii complex                  Moderate Enterococcus faecium                  Light >2 organisms - contaminated specimen. suggest recollection Anaerobic gram negative rods Beta Lactamase Positive          Susceptibility      Acinetobacter baumannii complex     GEMA     Ampicillin/sulbactam ($) Susceptible     Cefepime ($$) Susceptible     Ceftazidime ($) Susceptible     Ceftriaxone ($) Intermediate     Ciprofloxacin ($) Resistant     Gentamicin ($) Susceptible     Levofloxacin ($) Resistant     Meropenem ($$) Resistant     Piperacillin/Tazobac ($) Resistant     Tobramycin ($) Susceptible     Trimeth/Sulfa Susceptible                  Linear View               Susceptibility      Enterococcus faecium     GEMA     Ampicillin ($) Resistant     Daptomycin ($$$$$)  See below  [1]      Linezolid ($$$$$) Susceptible     Vancomycin ($) Susceptible                 [1]  Dose Dependent  (SENSITIVITIES PERFORMED BY E-TEST)          Linear View                   CULTURE, BLOOD, PAIRED [[id number]]  (Abnormal) Collected: 05/14/22 2000    Order Status: Completed Specimen: Blood Updated: 05/17/22 1212     Special Requests: No Special Requests        Culture result:       Staphylococcus species, coagulase negative GROWING IN THE ANAEROBIC BOTTLE.  No Site Indicated            (NOTE) PRELIMINARY REPORT OF GRAM POSITIVE COCCI IN CLUSTERS GROWING IN THE ANAEROBIC BOTTLE, CALLED TO AND READ BACK BY BENSON,LAB 5/16/22 1118 [facility]. Labs:     Recent Labs     05/25/22  0601 05/24/22  0400   WBC 16.8* 15.2*   HGB 10.0* 8.5*   HCT 31.4* 27.4*    190     Recent Labs     05/25/22  0601 05/24/22  0400 05/23/22 2010 05/23/22  1635 05/23/22  0420    148* 149*   < > 147*  147*   K 3.4* 3.7 3.8   < > 4.1  4.1   * 123* 123*   < > 120*  120*   CO2 18* 18* 21  --  18*  18*   BUN 35* 45* 46*  --  49*  48*   CREA 0.72 0.91 0.91  --  1.09*  1.05*   * 142* 122*  --  238*  240*   CA 8.7 8.1* 7.8*  --  8.0*  7.9*   MG 2.2  --   --   --  2.4   PHOS 3.3  --   --   --  3.4  3.4    < > = values in this interval not displayed. Recent Labs     05/25/22  0601 05/24/22  0400 05/23/22 2010   * 325* 350*   AP 96 83 80   TBILI 0.5 0.5 0.5   TP 7.2 6.1* 5.2*   ALB 1.6* 1.4* 1.3*   GLOB 5.6* 4.7* 3.9     Recent Labs     05/25/22  0745 05/24/22 0400 05/23/22 2010   INR 1.6* 1.6* 1.6*   PTP 18.8* 19.4* 19.4*      No results for input(s): FE, TIBC, PSAT, FERR in the last 72 hours. No results found for: FOL, RBCF   Recent Labs     05/25/22  0550 05/24/22  1700   PH 7.33* 7.35   PCO2 29* 29*   PO2 121* 101*     No results for input(s): CPK, CKNDX, TROIQ in the last 72 hours.     No lab exists for component: CPKMB  Lab Results   Component Value Date/Time    Cholesterol, total 124 03/08/2022 07:40 AM    HDL Cholesterol 30 03/08/2022 07:40 AM    LDL,Direct 79 06/28/2021 12:00 AM    LDL, calculated 44.8 03/08/2022 07:40 AM    Triglyceride 202 (H) 05/16/2022 06:20 AM    CHOL/HDL Ratio 4.1 03/08/2022 07:40 AM     Lab Results   Component Value Date/Time    Glucose (POC) 140 (H) 05/25/2022 05:51 AM    Glucose (POC) 196 (H) 05/25/2022 12:03 AM    Glucose (POC) 206 (H) 05/24/2022 05:50 PM    Glucose (POC) 184 (H) 05/24/2022 11:11 AM    Glucose (POC) 154 (H) 05/24/2022 05:36 AM     Lab Results   Component Value Date/Time    Color Yellow/Straw 05/14/2022 08:08 PM    Appearance Clear 05/14/2022 08:08 PM    Specific gravity 1.014 05/14/2022 08:08 PM    pH (UA) 6.0 05/14/2022 08:08 PM    Protein 30 (A) 05/14/2022 08:08 PM    Glucose 50 (A) 05/14/2022 08:08 PM    Ketone Negative 05/14/2022 08:08 PM    Bilirubin Negative 05/14/2022 08:08 PM    Urobilinogen 0.1 05/14/2022 08:08 PM    Nitrites Negative 05/14/2022 08:08 PM    Leukocyte Esterase Trace (A) 05/14/2022 08:08 PM    Bacteria Negative 05/14/2022 08:08 PM    Bacteria Rare (A) 05/14/2022 08:08 PM    WBC 0-4 05/14/2022 08:08 PM    WBC 4 05/14/2022 08:08 PM    RBC 0-5 05/14/2022 08:08 PM    RBC 1 05/14/2022 08:08 PM         Assessment:   Acute respiratory failure on ventilator 30% O2  Sacral decubitus ulcer postdebridement  Sepsis with leukocytosis elevated procalcitonin and CRP  Left foot wound  Recent removal of left great toe and second toe  CVA with PEG tube placement  History of aspiration pneumonia  History of chronic kidney disease  CAD with ejection fraction about 20 to 25%  Hypertension  Hyperlipidemia  Anemia of chronic disease  Hyperkalemia  Static post transfusion  Uncontrolled diabetes  Hepatic shock/improving  Coagulopathy  Elevated  Troponin  Bacteremia with coagulase-negative Staphylococcus  Procedure:  1. Left transmetatarsal amputation. 2.  Sacral wound excisional wound debridement 13 x 15 cm to the bone. Plan:     Unasyn 3 g every 8 hours  Aspirin 81 mg daily  Questran 4 g twice a day  Pepcid 20 twice daily  Ferrous sulfate 325 mg twice a day  Lantus 20 mg IV every 12 hours  Gabapentin 300 mg twice a day  Hydralazine 12.5 mg 3 times a day  Metoprolol 25 daily  Replace potassium  Entresto 1 tablet twice a day  Vancomycin with pharmacy protocol  Lantus 50 units subcu in a.m.   Add Lantus 10 units in the evening  Replace potassium today    GI consult for hepatic shock  Hematology consult for coagulopathy      Monitor liver function/INR  Static post transfusion    And daughter upset   By the vascular surgeon ordered PT/INR if INR less than 1.5 plan for laparoscopic loop colostomy    Overall prognosis very poor    Per Nephrology   Will change IV normal saline to half-normal saline to increase her free water intake. Resuming free water with the PEG feeding will help in improving the serum sodium.     laparoscopic loop colostomy for fecal diversion today.       Current Facility-Administered Medications:     0.9% sodium chloride infusion 250 mL, 250 mL, IntraVENous, PRN, Radha Bob MD    furosemide (LASIX) injection 20 mg, 20 mg, IntraVENous, Q12H, Radha Bob MD, 20 mg at 05/25/22 0802    sodium chloride (NS) flush 5-40 mL, 5-40 mL, IntraVENous, Q8H, Radha Bob MD, 10 mL at 05/25/22 0600    sodium chloride (NS) flush 5-40 mL, 5-40 mL, IntraVENous, PRN, Radha Bob MD    ondansetron (ZOFRAN ODT) tablet 4 mg, 4 mg, Oral, Q8H PRN **OR** ondansetron (ZOFRAN) injection 4 mg, 4 mg, IntraVENous, Q6H PRN, Radha Bob MD    enoxaparin (LOVENOX) injection 40 mg, 40 mg, SubCUTAneous, DAILY, Radha Bob MD, 40 mg at 05/25/22 0801    dextrose 5% infusion, 30 mL/hr, IntraVENous, CONTINUOUS, Kalpesh Lara MD, Last Rate: 30 mL/hr at 05/24/22 1409, 30 mL/hr at 05/24/22 1409    insulin lispro (HUMALOG) injection, , SubCUTAneous, Q6H, Radha Bob MD, 1 Units at 05/25/22 0008    glucose chewable tablet 16 g, 4 Tablet, Oral, PRN, Radha Bob MD    glucagon [facility]) injection 1 mg, 1 mg, IntraMUSCular, PRN, Radha Bob MD    dextrose 10% infusion 0-250 mL, 0-250 mL, IntraVENous, PRN, Radha Bob MD    hydrALAZINE (APRESOLINE) tablet 12.5 mg, 12.5 mg, Oral, TID, Radha Bob MD, 12.5 mg at 05/25/22 0802    sacubitriL-valsartan (ENTRESTO) 24-26 mg tablet 1 Tablet, 1 Tablet, Oral, Q12H, Radha Bob MD, 1 Tablet at 05/25/22 0801    metoprolol succinate (TOPROL-XL) XL tablet 25 mg, 25 mg, Oral, DAILY, PaulinoRadha MD, 25 mg at 05/25/22 0802    ampicillin-sulbactam (UNASYN) 3 g in 0.9% sodium chloride (MBP/ADV) 100 mL MBP, 3 g, IntraVENous, Q8H, Viry Bob MD, Last Rate: 200 mL/hr at 05/25/22 0541, 3 g at 05/25/22 0541    dextrose 10% infusion 0-250 mL, 0-250 mL, IntraVENous, PRN, Viry Bob MD, 125 mL at 05/19/22 0537    insulin glargine (LANTUS) injection 10 Units, 10 Units, SubCUTAneous, QHS, Viry Bob MD, 10 Units at 05/24/22 2102    NOREPINephrine (LEVOPHED) 32,000 mcg in dextrose 5% 250 mL (128 mcg/mL) infusion, 2 mcg/min, IntraVENous, TITRATE, Viry Bob MD, Stopped at 05/25/22 0745    propofol (DIPRIVAN) 10 mg/mL infusion, 0-50 mcg/kg/min, IntraVENous, TITRATE, Viry Bob MD, Last Rate: 14.3 mL/hr at 05/25/22 0750, 30 mcg/kg/min at 05/25/22 0750    0.9% sodium chloride infusion 250 mL, 250 mL, IntraVENous, PRN, Viry Bob MD, Last Rate: 15 mL/hr at 05/22/22 0820, Rate Verify at 05/22/22 0820    sodium hypochlorite (HALF STRENGTH DAKIN'S) 0.25% irrigation (bottle), , Topical, BID, Viry Bob MD, Given at 05/25/22 0805    Vancomycin Pharmacy Dosing, , Other, Rx Dosing/Monitoring, Viry Bob MD    sodium chloride (NS) flush 5-40 mL, 5-40 mL, IntraVENous, Q8H, Viry Bob MD, 10 mL at 05/25/22 0600    acetaminophen (TYLENOL) tablet 650 mg, 650 mg, Oral, Q6H PRN, 650 mg at 05/22/22 0556 **OR** acetaminophen (TYLENOL) suppository 650 mg, 650 mg, Rectal, Q6H PRN, Viry Bob MD, 650 mg at 05/16/22 2223    polyethylene glycol (MIRALAX) packet 17 g, 17 g, Oral, DAILY PRN, Viry Bob MD    ondansetron (ZOFRAN ODT) tablet 4 mg, 4 mg, Oral, Q8H PRN **OR** ondansetron (ZOFRAN) injection 4 mg, 4 mg, IntraVENous, Q6H PRN, Viry Bob MD    famotidine (PEPCID) tablet 20 mg, 20 mg, Oral, BID, Viry Bob MD, 20 mg at 05/25/22 0802    acidophilus-pectin, citrus tablet 2 Tablet, 2 Tablet, Oral, DAILY, Viry Bob MD, 2 Tablet at 05/25/22 0802    albuterol-ipratropium (DUO-NEB) 2.5 MG-0.5 MG/3 ML, 3 mL, Nebulization, Q6H PRN, Jun, 201 SYLVIA Dunn Domenic Hilario MD    artificial saliva (MOUTH KOTE) 1 Spray, 1 Spray, Oral, TID, Rosales Bob MD, 1 [place] at 05/23/22 2129    aspirin delayed-release tablet 81 mg, 81 mg, Oral, DAILY, Rosales Bob MD, 81 mg at 05/25/22 0802    brimonidine (ALPHAGAN) 0.2 % ophthalmic solution 1 Drop, 1 Drop, Both Eyes, TID, Rosales Bob MD, 1 Drop at 05/25/22 0806    cholestyramine-aspartame (QUESTRAN LIGHT) packet 4 g, 4 g, Oral, BID WITH MEALS, Rosales Bob MD, 4 g at 05/25/22 0801    [Held by provider] fenofibrate nanocrystallized (TRICOR) tablet 48 mg, 48 mg, Oral, DAILY, Rosales Bob MD, 48 mg at 05/19/22 4917    ferrous sulfate tablet 325 mg, 325 mg, Oral, BID, Rosales Bob MD, 325 mg at 05/25/22 0802    gabapentin (NEURONTIN) capsule 300 mg, 300 mg, Oral, BID, Rosales Bob MD, 300 mg at 05/25/22 0802    insulin glargine (LANTUS) injection 50 Units, 50 Units, SubCUTAneous, DAILY, Rosales Bob MD, 50 Units at 05/25/22 0801    latanoprost (XALATAN) 0.005 % ophthalmic solution 1 Drop, 1 Drop, Right Eye, QPM, Rosales Bob MD, 1 Drop at 05/24/22 1842    traMADoL (ULTRAM) tablet 25 mg, 25 mg, Oral, Q12H PRN, Rosales Bob MD, 25 mg at 05/18/22 1659

## 2022-05-25 NOTE — PROGRESS NOTES
Surgery critical care Progress Note     Subjective:   Patient examined in ICU. Hospital Course:  Patient examined this morning. Patient had wound VAC applied on the sacral area. Patient's left foot wound clean. Patient has been diuresed with Lasix yesterday. Subjective:  Not able to obtain        Review of Systems  Unable to obtain. Objective:      Visit Vitals  BP (!) 120/51 (BP 1 Location: Right lower arm, BP Patient Position: At rest)   Pulse (!) 103   Temp 97.5 °F (36.4 °C)   Resp 15   Ht 5' 7.01\" (1.702 m)   Wt 196 lb 3.4 oz (89 kg)   SpO2 100%   Breastfeeding No   BMI 30.72 kg/m²       Patient is intubated. Head and neck atraumatic, normocephalic. ENT: No hoarse voice  Cardiac system regular rate rhythm. Pulmonary no audible wheeze  Chest wall excursion normal with respiration cycle  Abdomen is soft not particularly distended. Neurologically nonfocal.  Skin is warm and moist.  Psychosocial: Unable to obtain. Vascular examination as previously noted no changes. Data Review           Assessment / Plan:  Neurology recommendation and assessment noted. Patient has been diuresed with Lasix yesterday. Patient has been hemodynamic stable. Liver enzymes normal now. INR is 1.6 yesterday. Patient will be provided with vitamin K and 1 unit of fresh frozen plasma this morning. INR pending this morning. We will proceed with laparoscopic loop colostomy for fecal diversion today. Critical care time spent 30minutes involving direct patient care as well as reviewing patient's labs and coordination of care with nursing staff     Care Plan discussed with: Patient/Family/RN/Case Management           Total time spent with patient: 30 minutes.

## 2022-05-25 NOTE — PROGRESS NOTES
Vancomycin Dosing Consult  Day #10 of vancomycin therapy  Consult ordered by Dr. Efren Li for this 78 y.o. female for indication of decubitus ulcer infection, sepsis. Antibiotic regimen: Vancomycin + Unasyn    Temp (24hrs), Av.1 °F (36.2 °C), Min:95.8 °F (35.4 °C), Max:97.6 °F (36.4 °C)    Recent Labs     22  0622   WBC 16.8* 15.2* 17.2*     Recent Labs     22  0601 220 22   CREA 0.72 0.91 0.91   BUN 35* 45* 46*     Recent Labs     22  0622  04022  0420   CRP 15.00* 13.60* 13.90*     Recent Labs     22  0420   PCT 0.45* 0.71* 1.02*       Estimated Creatinine Clearance: 77.4 mL/min (based on SCr of 0.72 mg/dL). ml/min  Concomitant nephrotoxic drugs: Norepinephrine     Cultures:    Wound: Moderate MDR Pseudomonas aeruginosa susceptible to Zerbaxa, aminoglycosides), moderate mixed skin yasmine, moderate mixed enteric yasmine including yeast, final   Blood: CoNS in the anaerobic bottle, final   Wound, ulcer:  Moderate Acinetobacter baumannii, moderate Enterococcus faecium, light >2 organisms (contraindicated), final  5/15 Wound, great toe: Heavy Acinetobacter baumannii complex (Zosyn resistant, susceptible to Unasyn), heavy Diphtheroids, final   Tissue: pending  MRSA Swab: Not ordered, patient already received first dose of vancomycin    Target range: Trough 10-15 mcg/mL   Last Level: 12.1 mcg/mL     Recent level history:  Date/Time Dose & Interval Measured Level (mcg/mL)    @ 0500 Held 17.6    @ 0400 500 mg X1 @ 1129 14.3    @ 0420 500 mg X1 @ 0957 15.5    held  -     @ 0601   12.1     Wt Readings from Last 1 Encounters:   22 89 kg (196 lb 3.4 oz)   Ideal body weight: 61.6 kg (135 lb 13.5 oz)  Adjusted ideal body weight: 72.6 kg (159 lb 15.8 oz)      Assessment/Plan:   Afebrile , Sepsis with leukocytosis elevated procalcitonin and CRP  CKD,  Scr trending down  Level therapeutic today.   Give 500mg vancomycin IV dose x 1 now  Pharmacy to continue to monitor level and adjust   Antimicrobial stop date TBD

## 2022-05-25 NOTE — PROGRESS NOTES
Progress Note    Patient: Neva Church MRN: [id number]  SSN: [id number]    YOB: 1945  Age: 78 y.o. Sex: female      Admit Date: 5/14/2022    LOS: 11 days     Subjective:     Patient seen and examined. This is a patient who is followed for new onset cardiomyopathy and elevated troponin following rapid decompensation prior to loop colostomy, sacral wound debridement, and amputation of left transmetatarsal.  Remains intubated/sedated with Propofol. Is s/p Left transmetatarsal amputation and sacral wound excisional wound debridement 13 x 15 cm to the bone 5/23 with possible plans for laparoscopic loop colostomy for fecal diversion tomorrow. Stable today, however CXR continues with congestion and effusions. For possible surgical intervention today. Telemetry Review: SR with occasional PVC    Review of Symptoms: intubated/sedated      Objective:     Vitals:    05/25/22 0830 05/25/22 0848 05/25/22 0900 05/25/22 0955   BP: (!) 98/44 104/60 (!) 93/55 (!) 88/51   Pulse: 74 74 64 (!) 56   Resp: 12 12 12 12   Temp: 97.6 °F (36.4 °C) 97.6 °F (36.4 °C)  97.5 °F (36.4 °C)   SpO2: 100% 100% 100% 100%   Weight:       Height:            Intake and Output:  Current Shift: 05/25 0701 - 05/25 1900  In: 280.2 [I.V.:9.4]  Out: -   Last three shifts: 05/23 1901 - 05/25 0700  In: 4822.3 [I.V.:3612.3]  Out: 3151 [Urine:3150]    Physical Exam:   General: Chronically ill appearing female lying in bed intubated/sedated, NAD  HEENT: Normocephalic, PERRL, no drainage  Neck: Supple, Trachea midline, No JVD  RESP: Coarse globally with diminished lower lobes. + Symmetrical chest movement. 30% FiO2. Intubated/Ventilated. Cardiovascular: RRR no RG. + murmur.   PVS: No rubor, cyanosis, mild pitting BLE edema, Radial, DP, PT pulses equal bilaterally  ABD: obese, soft, NT, Normoactive BS  Derm: +arterial line,+sacral wound vac  :+giraldo catheter  Neuro: Sedated with propofol, open eyes and followed simple command of blinking  PSYCH: No anxiety or agitation      Lab/Data Review:  BMP:   Lab Results   Component Value Date/Time     05/25/2022 06:01 AM    K 3.4 (L) 05/25/2022 06:01 AM     (H) 05/25/2022 06:01 AM    CO2 18 (L) 05/25/2022 06:01 AM    AGAP 8 05/25/2022 06:01 AM     (H) 05/25/2022 06:01 AM    BUN 35 (H) 05/25/2022 06:01 AM    CREA 0.72 05/25/2022 06:01 AM    GFRAA >60 05/25/2022 06:01 AM    GFRNA >60 05/25/2022 06:01 AM            Current Facility-Administered Medications:     0.9% sodium chloride infusion 250 mL, 250 mL, IntraVENous, PRN, Colette Bob MD    furosemide (LASIX) injection 20 mg, 20 mg, IntraVENous, Q12H, Colette Bob MD, 20 mg at 05/25/22 0802    potassium chloride 10 mEq in 100 ml IVPB, 10 mEq, IntraVENous, ONCE, Lynne Soto MD    vancomycin (VANCOCIN) 500 mg in 0.9% sodium chloride (MBP/ADV) 100 mL MBP, 500 mg, IntraVENous, ONCE, [place] DO    [START ON 5/26/2022] Vancomycin - Reminder to please draw level 5/26 @ 0400, , Other, Rx Dosing/Monitoring, [place] DO    sodium chloride (NS) flush 5-40 mL, 5-40 mL, IntraVENous, Q8H, Colette Bob MD, 10 mL at 05/25/22 0600    sodium chloride (NS) flush 5-40 mL, 5-40 mL, IntraVENous, PRN, Colette Bob MD    ondansetron (ZOFRAN ODT) tablet 4 mg, 4 mg, Oral, Q8H PRN **OR** ondansetron (ZOFRAN) injection 4 mg, 4 mg, IntraVENous, Q6H PRN, Colette Bob MD    enoxaparin (LOVENOX) injection 40 mg, 40 mg, SubCUTAneous, DAILY, Colette Bob MD, 40 mg at 05/25/22 0801    dextrose 5% infusion, 30 mL/hr, IntraVENous, CONTINUOUS, Kalpesh Lara MD, Last Rate: 30 mL/hr at 05/24/22 1409, 30 mL/hr at 05/24/22 1409    insulin lispro (HUMALOG) injection, , SubCUTAneous, Q6H, Colette Bob MD, 1 Units at 05/25/22 0008    glucose chewable tablet 16 g, 4 Tablet, Oral, PRN, Colette Bob MD    glucagon [facility]) injection 1 mg, 1 mg, IntraMUSCular, PRN, Colette Bob MD    dextrose 10% infusion 0-250 mL, 0-250 mL, IntraVENous, PRN, Odalys Bob MD    hydrALAZINE (APRESOLINE) tablet 12.5 mg, 12.5 mg, Oral, TID, Odalys Bob MD, 12.5 mg at 05/25/22 0802    sacubitriL-valsartan (ENTRESTO) 24-26 mg tablet 1 Tablet, 1 Tablet, Oral, Q12H, Odalys Bob MD, 1 Tablet at 05/25/22 0801    metoprolol succinate (TOPROL-XL) XL tablet 25 mg, 25 mg, Oral, DAILY, Odalys Bob MD, 25 mg at 05/25/22 0802    ampicillin-sulbactam (UNASYN) 3 g in 0.9% sodium chloride (MBP/ADV) 100 mL MBP, 3 g, IntraVENous, Q8H, Odalys Bob MD, Last Rate: 200 mL/hr at 05/25/22 0541, 3 g at 05/25/22 0541    dextrose 10% infusion 0-250 mL, 0-250 mL, IntraVENous, PRN, Odalys Bob MD, 125 mL at 05/19/22 0537    insulin glargine (LANTUS) injection 10 Units, 10 Units, SubCUTAneous, QHS, Odalys Bob MD, 10 Units at 05/24/22 2102    NOREPINephrine (LEVOPHED) 32,000 mcg in dextrose 5% 250 mL (128 mcg/mL) infusion, 2 mcg/min, IntraVENous, TITRATE, Odalys Bob MD, Last Rate: 0.9 mL/hr at 05/25/22 0955, 2 mcg/min at 05/25/22 0955    propofol (DIPRIVAN) 10 mg/mL infusion, 0-50 mcg/kg/min, IntraVENous, TITRATE, Odalys Bob MD, Last Rate: 14.3 mL/hr at 05/25/22 0750, 30 mcg/kg/min at 05/25/22 0750    0.9% sodium chloride infusion 250 mL, 250 mL, IntraVENous, PRN, Odalys Bob MD, Last Rate: 15 mL/hr at 05/22/22 0820, Rate Verify at 05/22/22 0820    sodium hypochlorite (HALF STRENGTH DAKIN'S) 0.25% irrigation (bottle), , Topical, BID, Odalys Bob MD, Given at 05/25/22 0805    Vancomycin Pharmacy Dosing, , Other, Rx Dosing/Monitoring, Odalys Bob MD    sodium chloride (NS) flush 5-40 mL, 5-40 mL, IntraVENous, Q8H, Odalys Bob MD, 10 mL at 05/25/22 0600    acetaminophen (TYLENOL) tablet 650 mg, 650 mg, Oral, Q6H PRN, 650 mg at 05/22/22 0556 **OR** acetaminophen (TYLENOL) suppository 650 mg, 650 mg, Rectal, Q6H PRN, Odalys Bob MD, 650 mg at 05/16/22 2223    polyethylene glycol (MIRALAX) packet 17 g, 17 g, Oral, DAILY PRN, Odalys Bob MD    ondansetron [facility] ODT) tablet 4 mg, 4 mg, Oral, Q8H PRN **OR** ondansetron (ZOFRAN) injection 4 mg, 4 mg, IntraVENous, Q6H PRN, Odalys Bob MD    famotidine (PEPCID) tablet 20 mg, 20 mg, Oral, BID, Odalys Bob MD, 20 mg at 05/25/22 0802    acidophilus-pectin, citrus tablet 2 Tablet, 2 Tablet, Oral, DAILY, Odalys Bob MD, 2 Tablet at 05/25/22 0802    albuterol-ipratropium (DUO-NEB) 2.5 MG-0.5 MG/3 ML, 3 mL, Nebulization, Q6H PRN, Odalys Bob MD    artificial saliva (MOUTH KOTE) 1 Spray, 1 Spray, Oral, TID, Odalys Bob MD, 1 Spray at 05/23/22 2129    aspirin delayed-release tablet 81 mg, 81 mg, Oral, DAILY, Odalys Bob MD, 81 mg at 05/25/22 0802    brimonidine (ALPHAGAN) 0.2 % ophthalmic solution 1 Drop, 1 Drop, Both Eyes, TID, Odalys Bob MD, 1 Drop at 05/25/22 0806    cholestyramine-aspartame (QUESTRAN LIGHT) packet 4 g, 4 g, Oral, BID WITH MEALS, Odalys Bob MD, 4 g at 05/25/22 0801    [Held by provider] fenofibrate nanocrystallized (TRICOR) tablet 48 mg, 48 mg, Oral, DAILY, Odalys Bob MD, 48 mg at 05/19/22 6693    ferrous sulfate tablet 325 mg, 325 mg, Oral, BID, Odalys Bob MD, 325 mg at 05/25/22 0802    gabapentin (NEURONTIN) capsule 300 mg, 300 mg, Oral, BID, Odalys Bob MD, 300 mg at 05/25/22 0802    insulin glargine (LANTUS) injection 50 Units, 50 Units, SubCUTAneous, DAILY, Odalys Bob MD, 50 Units at 05/25/22 0801    latanoprost (XALATAN) 0.005 % ophthalmic solution 1 Drop, 1 Drop, Right Eye, QPM, Odalys Bob MD, 1 Drop at 05/24/22 1842    traMADoL (ULTRAM) tablet 25 mg, 25 mg, Oral, Q12H PRN, Odalys Bob MD, 25 mg at 05/18/22 0056      Assessment:     Active Problems:    Sacral ulcer ([address] (5/14/2022)        Plan:     Case discussed with Collaborating physician Dr. Jayden Wong and our recommendations are as follows:     1.  New onset cardiomyopathy:  Echo 5/18/22 showing EF of 20-25% down from 50-55% in 4/2022.  Continue Tier 1 Entresto and will further wean hydralazine to 12.5mg TID to allow for titration of Entresto. May continue BB given it was a home medication. Continue Metoprolol Succinate to align with GDMT. Mildly hypervolemic today. Good urine output 2151 mL. Recommend ischemic evaluation, but it is deferred at this time given acute clinic state and need for intervention, despite evaluation and findings it would not change that patient is high risk for upcoming procedure. Discussed this with family in detail and they reported that they still wanted to move forward with procedure with Dr. Meaghan Andrade.  2. Elevated troponin:  Peaked at 890 on the day of deterioration requiring intubation/sedation.  Hgb 10.0, stable at this time.  Continue beta blocker.  Ischemic evaluation pending post- op clinical course.  No statin given LFTs elevated likely due to shock. Will start when appropriate. Can consider Risk factor modification labs. 3.   NSVT: No reoccurrence.  Continue beta blocker. Maintain lytes. 4.   Sepsis:  Per primary/ID. 5. Hypokalemia: recommend repletion to goal 4-5. Magnesium 2.2. Continue telemetry. Repeat labs in am.  6. Hypoalbuminemia: Albumin 1.4. Consider nutritionist consult. 7. Risk Stratification for upcoming laparoscopic loop colostomy for fecal diversion: Given above patient is at high risk. Lengthy discussion had with family discussing this and they are aware. Will continue medical management and assistance throughout. Thank you for involving us in the care of this patient. Please do not hesitate to call if additional questions arise. If after hours please call [phone]. Signed By: Lorenza Laurent NP     May 25, 2022        Dr. Guanaco Gomez Cardiologist    [facility]  [address] [facility] [address]    [address] [facility], [facility]  [phone]

## 2022-05-25 NOTE — PROGRESS NOTES
Comprehensive Nutrition Assessment    Type and Reason for Visit: Reassess (early goal)    Nutrition Recommendations/Plan:   1. Continue NPO  2. With surgeon approval, initiate the following s/p procedure:        TF via PEG to Vital 1.2 at goal of 35mL/hr        Flush with 150 mL H2O Q4H        Provide 2 pkt prosource 2x/d (4pkt/d; 240kcals, 60g pro)        Provides 1248 kcal (79%), 123g PRO (100%), 1581 mL H2O (102%).       Propofol at 30mcg/kg/min providing 378kcals/d (103%)        D5 at 30ml/hr providing 122kcals/d, d/c as medically able     4. Please document TF rate, tolerance, BM in I/Os. Malnutrition Assessment:  Malnutrition Status:  Severe malnutrition (05/19/22 1143)    Context:  Chronic illness     Findings of the 6 clinical characteristics of malnutrition:   Energy Intake:  No significant decrease in energy intake (via TF)  Weight Loss:  Greater than 5% over 1 month (16# x1 month per EMR (8.3%, severe))     Body Fat Loss:  No significant body fat loss,     Muscle Mass Loss:  Severe muscle mass loss, Clavicles (pectoralis &deltoids),Thigh (quadriceps)  Fluid Accumulation:  No significant fluid accumulation,        Nutrition Assessment:    Admitted for sacral PI, (4/21) Debridment. (5/19) Plan for procedure however delayed d/t hypotension. No pressor started, MAP 65-70mmHg, pt now intubated in ICU. (5/23) Repeat I&D, L TMA--Wound vacs placed. Pt remains intubated, plan for vascular surgery today, TF on hold since midnight. Low-dose pressor started today. Pt on Glucerna pta, started on same formula at admit and tolerated well at goal rate. TF adjusted to Vital s/p intubation, breifly at lower rate however w/ propofol met needs. Restart TF s/p procedure, Labs: H/H 10.0/31.4, K+ 3.4, BUN 35, -206, , Alb 1. 6. Meds: Probiotic, ampicillin, Questran Light, D5 at 30ml/hr, pepcid, Tricor, FeSu, Insulin, Propofol at 30mcg/kg/min.      Nutrition Related Findings:    No N/V/D/C nor s/s of aspiration per Nsg. PEG in place. Remains NPO. Generalized non-pitting edema. Last BM 5/25, loose, chronic diarrhea per daughter. Wound Type: Surgical incision,Multiple,Wound vac (Surgical- L foot s/p TMA with wound vac; Stage 4 sacrum s/p I/D with wound vac)      Current Nutrition Intake & Therapies:  Average Meal Intake: NPO  Average Supplement Intake: NPO  DIET NPO  ADULT TUBE FEEDING PEG; Peptide Based; Delivery Method: Continuous; Continuous Initial Rate (mL/hr): 35; Continuous Advance Tube Feeding: No; Water Flush Volume (mL): 150; Water Flush Frequency: Q 4 hours; Modulars/Additives: Protein; Specify How . .. Additional Calorie Sources:  Propofol at 30mcg/kg/min providing 378kcals/d, D5 at 30ml/hr providing 122kcals/d      Anthropometric Measures:  Height: 5' 7.01\" (170.2 cm)  Ideal Body Weight (IBW): 135 lbs (61 kg)  Admission Body Weight: 175 lb  Current Body Wt:  89.2 kg (196 lb 10.4 oz) (5/23), 145.7 % IBW. Bed scale  Current BMI (kg/m2): 30.8        Weight Adjustment:  (EDW 80kg; BMI 27.6)                 BMI Category: Overweight (BMI 25.0-29. 9)    Estimated Daily Nutrient Needs:  Energy Requirements Based On: Formula  Weight Used for Energy Requirements: Other (specify) (EDW)  Energy (kcal/day): 1582kcals (PSU 2003b +250 for wounds)  Weight Used for Protein Requirements: Other (specify) (EDW)  Protein (g/day): 120-136g (1.5-1.7g/kg)  Method Used for Fluid Requirements: 1 ml/kcal  Fluid (ml/day): 1654ml    Nutrition Diagnosis:   · Severe malnutrition,In context of chronic illness related to catabolic illness as evidenced by weight loss greater than or equal to 5% in 1 month,severe muscle loss      Nutrition Interventions:   Food and/or Nutrient Delivery: Continue NPO,Modify tube feeding  Nutrition Education/Counseling: No recommendations at this time  Coordination of Nutrition Care: Continue to monitor while inpatient  Plan of Care discussed with: RN    Goals:  Previous Goal Met: Goal(s) achieved (meet >/=75% of EENs, tolerate NST at goal)  Goals: Meet at least 75% of estimated needs,Tolerate nutrition support at goal rate,within 7 days  Specify Other Goals: Maintain BGs <180 mg/dL.  Maintain CBW within +/- 0.5 kg    Nutrition Monitoring and Evaluation:   Behavioral-Environmental Outcomes: None identified  Food/Nutrient Intake Outcomes: Enteral nutrition intake/tolerance,Vitamin/mineral intake  Physical Signs/Symptoms Outcomes: Biochemical data,Diarrhea,Weight,Skin    Discharge Planning:    Enteral nutrition    Brenda Baez  Contact: Ext [phone], or via PerfectServe

## 2022-05-25 NOTE — PROGRESS NOTES
Remains acute in ICU on vent. Accepted to ([address]). No other acceptance to any other SNF facility. Auth won't be started until patient is more medically stable.        Rodger Wilburn, MSW

## 2022-05-26 NOTE — PROGRESS NOTES
Hematology and Oncology Progress Note    Patient: Lavell Wu MRN: [id number]  SSN: [id number]    YOB: 1945  Age: 78 y.o. Sex: female      Admit Date: 5/14/2022    LOS: 12 days     Chief Complaint: Patient is intubated and unresponsive    Subjective:     Patient is intubated and unresponsive. Objective:     Vitals:    05/26/22 1100 05/26/22 1348 05/26/22 1439 05/26/22 1500   BP:   (!) 122/39    Pulse:  80 75    Resp:  13     Temp: 98.2 °F (36.8 °C)   97.9 °F (36.6 °C)   SpO2:  100%     Weight:       Height:              Physical Exam:   Constitutional: -American female. Unresponsive and intubated  Eyes: Conjunctiva is pale  Respiratory: Intubated  Cardiovascular: Normal sinus rhythm. Neurologic: Unresponsive    Results for Ana Javier (MRN [id number]) as of 5/26/2022 16:03   Ref. Range 5/24/2022 04:00 5/25/2022 06:01 5/26/2022 04:45   WBC Latest Ref Range: 3.6 - 11.0 K/uL 15.2 (H) 16.8 (H) 16.2 (H)   NRBC Latest Ref Range: 0.0  WBC 0.3 (H) 0.2 (H) 0.0   RBC Latest Ref Range: 3.80 - 5.20 M/uL 3.00 (L) 3.57 (L) 3.19 (L)   HGB Latest Ref Range: 11.5 - 16.0 g/dL 8.5 (L) 10.0 (L) 9.0 (L)   HCT Latest Ref Range: 35.0 - 47.0 % 27.4 (L) 31.4 (L) 28.4 (L)   MCV Latest Ref Range: 80.0 - 99.0 FL 91.3 88.0 89.0   MCH Latest Ref Range: 26.0 - 34.0 PG 28.3 28.0 28.2   MCHC Latest Ref Range: 30.0 - 36.5 g/dL 31.0 31.8 31.7   RDW Latest Ref Range: 11.5 - 14.5 % 17.4 (H) 17.0 (H) 17.1 (H)   PLATELET Latest Ref Range: 150 - 400 K/uL 190 289 294     Results for Ana Javier (MRN [id number]) as of 5/26/2022 16:03   Ref. Range 5/23/2022 20:10 5/24/2022 04:00 5/25/2022 07:45 5/25/2022 11:15   INR Latest Ref Range: 0.9 - 1.1   1.6 (H) 1.6 (H) 1.6 (H) 1.5 (H)   Prothrombin time Latest Ref Range: 11.9 - 14.6 sec 19.4 (H) 19.4 (H) 18.8 (H) 17.8 (H)     Assessment and plan:     89-year-old female  admitted with severe decubitus ulcer related infection/sepsis.   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    2) patient is currently in shock secondary to sepsis/cardiogenic shock.  -Patient's LFTs are now within normal limits     3) coagulopathy: Appears to be secondary to liver failure.  -Patient had vitamin K.  -No evidence of DIC. She has normal fibrinogen and PTT. -Patient's INR is 1.5 today.   -And is on vitamin K.    4) anemia: Acute on chronic anemia.  Likely secondary to CKD and some bleeding from ongoing wound problems.  -Patient has received packed RBC transfusion earlier this admission.  -Patient's hemoglobin is in today. No need for transfusion. I will sign off the case. Please do not hesitate to give us a call if you have any questions or concerns. This dictation was done by dragon, computer voice recognition software. Often unanticipated grammatical, syntax, phones and other interpretive errors are inadvertently transcribed. Please excuse errors that have escaped final proofreading.        Signed By: Hina Sanford MD     May 26, 2022

## 2022-05-26 NOTE — PROGRESS NOTES
Surgery critical care Progress Note     Subjective:   Patient examined in ICU. Hospital Course:  Surgery was canceled due to unstable hemodynamics. This morning patient still on low-dose Levophed drip. Patient apparently have recurrent bradycardia once Levophed was discontinued. Next  Patient has been diuresed well with Lasix 20 g IV every 12 hours      Wound VAC is intact. I examined the patient left foot wounds are clean and dry and viable. Subjective:  Unable to assess        Review of Systems unable to assess     Objective:      Visit Vitals  BP (!) 131/46 (BP 1 Location: Left lower arm, BP Patient Position: At rest)   Pulse 93   Temp 97.4 °F (36.3 °C)   Resp 17   Ht 5' 6.93\" (1.7 m)   Wt 200 lb 2.8 oz (90.8 kg)   SpO2 100%   Breastfeeding No   BMI 31.42 kg/m²       Patient is intubated. Head and neck atraumatic, normocephalic. ENT: No hoarse voice  Cardiac system regular rate rhythm. Pulmonary no audible wheeze  Chest wall excursion normal with respiration cycle  Abdomen is soft not particularly distended. Neurologically nonfocal.  Skin is warm and moist.  Psychosocial: Unable to assess  Vascular examination as previously noted no changes. Data Review  Reviewed           Assessment / Plan: We will continue monitor her hemodynamics closely. Currently we are unable to provide her colostomy placement for fecal diversion for stage IV sacral decubitus ulcer. We will continue monitor her progress. Wound VAC on the sacrum is due to change today. Left foot wounds are clean and dry. Patient is a daily wound care and dressing changes on the left foot. I have updated family yesterday regarding not able to perform surgery at this time. We will continue monitor her progress.          Critical care time spent 30minutes involving direct patient care as well as reviewing patient's labs and coordination of care with nursing staff     Care Plan discussed with: Patient/Family/RN/Case Management Total time spent with patient: 30 minutes.

## 2022-05-26 NOTE — PROGRESS NOTES
IMPRESSION:   1. Acute hypoxic respiratory failure remains on the ventilator  2. Sacral decubiti ulcer with Acinetobacter and Enterococcus  3. Severe sepsis with septic shock resolved on no pressors  4. Left foot wound  5. Transaminitis continue to improve  6. Shock liver  7. Hypokalemia repleted  8. Symptomatic anemia stable after transfusion  9. Cardiomyopathy  10. Mild pulmonary edema      RECOMMENDATIONS/PLAN:   1. ICU monitoring  1. Ventilator for mechanical life support and prevent respiratory arrest with protective lung strategies  2. Will start weaning we will do sedation vacation today  3. Pending loop colostomy possible surgery which has been postponed  4. Liver enzymes improved   5. Patient underwent on 5/23 left metatarsal amputation and sacral wound debridement  6. Severe sepsis with decubiti ulcer   7. Patient is on Unasyn  8. Chest x-ray shows mild congestive changes with fluid in the minor fissure  9. Anemia hemoglobin improved after transfusion 5/20     [x] High complexity decision making was performed  [x] See my orders for details  HPI   81-year-old lady came in because of leg pain past medical history of hypertension diabetes mellitus peripheral vascular disease CVA she is bedbound she has leg wound and also has sacral decubiti ulcer and she was scheduled for laparoscopic colostomy and sacral wound debridement and amputation of the left tarsometatarsal because of wound infection but her condition got worse her liver enzyme was elevated INR was also elevated she was intubated transferred to ICU was getting vancomycin and Unasyn started on Levophed because of low blood pressure.     PMH:  has a past medical history of Anemia, Chronic kidney disease, Diabetes mellitus ([address] Hemiplegia affecting dominant side, post-stroke ([address] (05/04/2022), HTN (hypertension), Hyperkalemia, Hyperlipidemia, Ill-defined condition, Neuropathy, PAD (peripheral artery disease) ([address] Sciatica, Stroke ([address] and UTI (urinary tract infection). PSH:   has a past surgical history that includes hx other surgical (Bilateral); hx amputation (Right); hx other surgical; hx cervical fusion; hx vascular stent (Bilateral); hx vascular stent (Bilateral); and hx gi. FHX: family history includes Cancer in her mother; Diabetes in her mother; Hypertension in her mother. SHX:  reports that she has never smoked. She has never used smokeless tobacco. She reports previous alcohol use. She reports that she does not use drugs.     ALL: No Known Allergies     MEDS:   [x] Reviewed - As Below   [] Not reviewed    Current Facility-Administered Medications   Medication    vancomycin (VANCOCIN) 750 mg in 0.9% sodium chloride 250 mL ([id number])    [START ON 5/28/2022] Vancomycin random level to be drawn on 5/28/22 at 0400 am.    0.9% sodium chloride infusion 250 mL    furosemide (LASIX) injection 20 mg    Vancomycin - Reminder to please draw level 5/26 @ 0400    phytonadione (vitamin K1) (AQUA-MEPHYTON) injection 10 mg    sodium chloride (NS) flush 5-40 mL    sodium chloride (NS) flush 5-40 mL    ondansetron (ZOFRAN ODT) tablet 4 mg    Or    ondansetron (ZOFRAN) injection 4 mg    enoxaparin (LOVENOX) injection 40 mg    dextrose 5% infusion    insulin lispro (HUMALOG) injection    glucose chewable tablet 16 g    glucagon (GLUCAGEN) injection 1 mg    dextrose 10% infusion 0-250 mL    hydrALAZINE (APRESOLINE) tablet 12.5 mg    sacubitriL-valsartan (ENTRESTO) 24-26 mg tablet 1 Tablet    metoprolol succinate (TOPROL-XL) XL tablet 25 mg    ampicillin-sulbactam (UNASYN) 3 g in 0.9% sodium chloride (MBP/ADV) 100 mL MBP    dextrose 10% infusion 0-250 mL    insulin glargine (LANTUS) injection 10 Units    NOREPINephrine (LEVOPHED) 32,000 mcg in dextrose 5% 250 mL (128 mcg/mL) infusion    propofol (DIPRIVAN) 10 mg/mL infusion    0.9% sodium chloride infusion 250 mL    sodium hypochlorite (HALF STRENGTH DAKIN'S) 0.25% irrigation (bottle)    Vancomycin Pharmacy Dosing    sodium chloride (NS) flush 5-40 mL    acetaminophen (TYLENOL) tablet 650 mg    Or    acetaminophen (TYLENOL) suppository 650 mg    polyethylene glycol (MIRALAX) packet 17 g    ondansetron (ZOFRAN ODT) tablet 4 mg    Or    ondansetron (ZOFRAN) injection 4 mg    famotidine (PEPCID) tablet 20 mg    acidophilus-pectin, citrus tablet 2 Tablet    albuterol-ipratropium (DUO-NEB) 2.5 MG-0.5 MG/3 ML    artificial saliva (MOUTH KOTE) 1 Spray    aspirin delayed-release tablet 81 mg    brimonidine (ALPHAGAN) 0.2 % ophthalmic solution 1 Drop    cholestyramine-aspartame (QUESTRAN LIGHT) packet 4 g    [Held by provider] fenofibrate nanocrystallized (TRICOR) tablet 48 mg    ferrous sulfate tablet 325 mg    gabapentin (NEURONTIN) capsule 300 mg    insulin glargine (LANTUS) injection 50 Units    latanoprost (XALATAN) 0.005 % ophthalmic solution 1 Drop    traMADoL (ULTRAM) tablet 25 mg      MAR reviewed and pertinent medications noted or modified as needed   Current Facility-Administered Medications   Medication    vancomycin (VANCOCIN) 750 mg in 0.9% sodium chloride 250 mL ([id number])    [START ON 5/28/2022] Vancomycin random level to be drawn on 5/28/22 at 0400 am.    0.9% sodium chloride infusion 250 mL    furosemide (LASIX) injection 20 mg    Vancomycin - Reminder to please draw level 5/26 @ 0400    phytonadione (vitamin K1) (AQUA-MEPHYTON) injection 10 mg    sodium chloride (NS) flush 5-40 mL    sodium chloride (NS) flush 5-40 mL    ondansetron (ZOFRAN ODT) tablet 4 mg    Or    ondansetron (ZOFRAN) injection 4 mg    enoxaparin (LOVENOX) injection 40 mg    dextrose 5% infusion    insulin lispro (HUMALOG) injection    glucose chewable tablet 16 g    glucagon (GLUCAGEN) injection 1 mg    dextrose 10% infusion 0-250 mL    hydrALAZINE (APRESOLINE) tablet 12.5 mg    sacubitriL-valsartan (ENTRESTO) 24-26 mg tablet 1 Tablet    metoprolol succinate (TOPROL-XL) XL tablet 25 mg    ampicillin-sulbactam (UNASYN) 3 g in 0.9% sodium chloride (MBP/ADV) 100 mL MBP    dextrose 10% infusion 0-250 mL    insulin glargine (LANTUS) injection 10 Units    NOREPINephrine (LEVOPHED) 32,000 mcg in dextrose 5% 250 mL (128 mcg/mL) infusion    propofol (DIPRIVAN) 10 mg/mL infusion    0.9% sodium chloride infusion 250 mL    sodium hypochlorite (HALF STRENGTH DAKIN'S) 0.25% irrigation (bottle)    Vancomycin Pharmacy Dosing    sodium chloride (NS) flush 5-40 mL    acetaminophen (TYLENOL) tablet 650 mg    Or    acetaminophen (TYLENOL) suppository 650 mg    polyethylene glycol (MIRALAX) packet 17 g    ondansetron (ZOFRAN ODT) tablet 4 mg    Or    ondansetron (ZOFRAN) injection 4 mg    famotidine (PEPCID) tablet 20 mg    acidophilus-pectin, citrus tablet 2 Tablet    albuterol-ipratropium (DUO-NEB) 2.5 MG-0.5 MG/3 ML    artificial saliva (MOUTH KOTE) 1 Spray    aspirin delayed-release tablet 81 mg    brimonidine (ALPHAGAN) 0.2 % ophthalmic solution 1 Drop    cholestyramine-aspartame (QUESTRAN LIGHT) packet 4 g    [Held by provider] fenofibrate nanocrystallized (TRICOR) tablet 48 mg    ferrous sulfate tablet 325 mg    gabapentin (NEURONTIN) capsule 300 mg    insulin glargine (LANTUS) injection 50 Units    latanoprost (XALATAN) 0.005 % ophthalmic solution 1 Drop    traMADoL (ULTRAM) tablet 25 mg      PMH:  has a past medical history of Anemia, Chronic kidney disease, Diabetes mellitus ([address] Hemiplegia affecting dominant side, post-stroke ([address] (05/04/2022), HTN (hypertension), Hyperkalemia, Hyperlipidemia, Ill-defined condition, Neuropathy, PAD (peripheral artery disease) ([address] Sciatica, Stroke ([address] and UTI (urinary tract infection). PSH:   has a past surgical history that includes hx other surgical (Bilateral); hx amputation (Right); hx other surgical; hx cervical fusion; hx vascular stent (Bilateral); hx vascular stent (Bilateral); and hx gi.    FHX: family history includes Cancer in her mother; Diabetes in her mother; Hypertension in her mother. SHX:  reports that she has never smoked. She has never used smokeless tobacco. She reports previous alcohol use. She reports that she does not use drugs. ROS:  Unable to obtain intubated on ventilator    Hemodynamics:    CO:    CI:    CVP:    SVR:   PAP Systolic:    PAP Diastolic:    PVR:    RV83:        Ventilator Settings:      Mode Rate TV Press PEEP FiO2 PIP Min. Vent   Assist control,Volume control    500 ml    5 cm H20 30 %  25 cm H2O  6.9 l/min        Vital Signs: Telemetry:    normal sinus rhythm Intake/Output:   Visit Vitals  BP (!) 142/57   Pulse 81   Temp 98.1 °F (36.7 °C)   Resp 14   Ht 5' 6.93\" (1.7 m)   Wt 90.8 kg (200 lb 2.8 oz)   SpO2 100%   Breastfeeding No   BMI 31.42 kg/m²       Temp (24hrs), Av.2 °F (36.2 °C), Min:96.5 °F (35.8 °C), Max:98.1 °F (36.7 °C)        O2 Device: Ventilator O2 Flow Rate (L/min): 1 l/min       Wt Readings from Last 4 Encounters:   22 90.8 kg (200 lb 2.8 oz)   04/10/22 86.6 kg (191 lb)   22 82.2 kg (181 lb 3.5 oz)   20 84.4 kg (186 lb)          Intake/Output Summary (Last 24 hours) at 2022 0938  Last data filed at 2022 0600  Gross per 24 hour   Intake 1572.31 ml   Output 3750 ml   Net -2177.69 ml       Last shift:      No intake/output data recorded. Last 3 shifts:  1901 -  0700  In: 2438.8 [I.V.:1558]  Out: 4951 [Urine:4950]       Physical Exam:     General: intubated on vent unresponsive on propofol  HEENT: NCAT,  Eyes: anicteric; conjunctiva clear doll's eye reflex present  Neck: no nodes, no cuff leak, trach midline; no accessory MM use. Neck veins obscured by obesity but seems slightly engorged  Chest: no deformity,   Cardiac: R regular; no murmur;   Lungs: distant breath sounds; no wheezes rales in the bases  Abd: soft, NT, hypoactive BS  Ext: Ace edema; no joint swelling;  No clubbing  :clear urine  Neuro: Positive on propofol doll's eye reflex present flaccid extremities  Psych-  unable to assess  Skin: warm, dry, no cyanosis;   Pulses: Brachial and radial pulses intact  Capillary: Normal capillary refill      DATA:    MAR reviewed and pertinent medications noted or modified as needed  MEDS:   Current Facility-Administered Medications   Medication    vancomycin (VANCOCIN) 750 mg in 0.9% sodium chloride 250 mL ([id number])    [START ON 5/28/2022] Vancomycin random level to be drawn on 5/28/22 at 0400 am.    0.9% sodium chloride infusion 250 mL    furosemide (LASIX) injection 20 mg    Vancomycin - Reminder to please draw level 5/26 @ 0400    phytonadione (vitamin K1) (AQUA-MEPHYTON) injection 10 mg    sodium chloride (NS) flush 5-40 mL    sodium chloride (NS) flush 5-40 mL    ondansetron (ZOFRAN ODT) tablet 4 mg    Or    ondansetron (ZOFRAN) injection 4 mg    enoxaparin (LOVENOX) injection 40 mg    dextrose 5% infusion    insulin lispro (HUMALOG) injection    glucose chewable tablet 16 g    glucagon (GLUCAGEN) injection 1 mg    dextrose 10% infusion 0-250 mL    hydrALAZINE (APRESOLINE) tablet 12.5 mg    sacubitriL-valsartan (ENTRESTO) 24-26 mg tablet 1 Tablet    metoprolol succinate (TOPROL-XL) XL tablet 25 mg    ampicillin-sulbactam (UNASYN) 3 g in 0.9% sodium chloride (MBP/ADV) 100 mL MBP    dextrose 10% infusion 0-250 mL    insulin glargine (LANTUS) injection 10 Units    NOREPINephrine (LEVOPHED) 32,000 mcg in dextrose 5% 250 mL (128 mcg/mL) infusion    propofol (DIPRIVAN) 10 mg/mL infusion    0.9% sodium chloride infusion 250 mL    sodium hypochlorite (HALF STRENGTH DAKIN'S) 0.25% irrigation (bottle)    Vancomycin Pharmacy Dosing    sodium chloride (NS) flush 5-40 mL    acetaminophen (TYLENOL) tablet 650 mg    Or    acetaminophen (TYLENOL) suppository 650 mg    polyethylene glycol (MIRALAX) packet 17 g    ondansetron (ZOFRAN ODT) tablet 4 mg    Or    ondansetron (ZOFRAN) injection 4 mg    famotidine (PEPCID) tablet 20 mg    acidophilus-pectin, citrus tablet 2 Tablet    albuterol-ipratropium (DUO-NEB) 2.5 MG-0.5 MG/3 ML    artificial saliva (MOUTH KOTE) 1 Spray    aspirin delayed-release tablet 81 mg    brimonidine (ALPHAGAN) 0.2 % ophthalmic solution 1 Drop    cholestyramine-aspartame (QUESTRAN LIGHT) packet 4 g    [Held by provider] fenofibrate nanocrystallized (TRICOR) tablet 48 mg    ferrous sulfate tablet 325 mg    gabapentin (NEURONTIN) capsule 300 mg    insulin glargine (LANTUS) injection 50 Units    latanoprost (XALATAN) 0.005 % ophthalmic solution 1 Drop    traMADoL (ULTRAM) tablet 25 mg        Labs:    Recent Labs     22  0445 22  1115 22  0745 22  0601 22  0400   WBC 16.2*  --   --  16.8* 15.2*   HGB 9.0*  --   --  10.0* 8.5*     --   --  289 190   INR  --  1.5* 1.6*  --  1.6*     Recent Labs     22  0445 22  0601 22  0400   * 145 148*   K 3.3* 3.4* 3.7   * 119* 123*   CO2 19* 18* 18*   * 156* 142*   BUN 32* 35* 45*   CREA 0.76 0.72 0.91   CA 8.5 8.7 8.1*   MG  --  2.2  --    PHOS  --  3.3  --    ALB 1.6* 1.6* 1.4*   * 276* 325*     Recent Labs     22  0530 22  0550 22  1700   PH 7.36 7.33* 7.35   PCO2 30* 29* 29*   PO2 94 121* 101*   HCO3 18* 17* 18*   FIO2 30 30.0 30    AC 12  PEEP 5 FiO2 30%   echo ejection fraction 20% mild mitral regurg left atrium 3.5 cm RVSP 35  , 1313, 1361    Lab Results   Component Value Date/Time    Culture result: Heavy  Mixed skin yasmine isolated   2022 06:15 PM    Culture result: Rare Normal respiratory yasmine 2022 02:48 PM    Culture result: Heavy Acinetobacter baumannii complex 05/15/2022 07:15 PM    Culture result: Heavy Diphtheroids 05/15/2022 07:15 PM     Lab Results   Component Value Date/Time    TSH 2.880 2016 10:13 AM        Imagin/22 chest x-ray with ET tube in place hazy accentuated basilar markings with small amount of fluid in the minor fissure  Results from Hospital Encounter encounter on 05/14/22    XR CHEST PORT    Narrative  Chest single view. Comparison single chest May 25, 2022. Unchanged ET tube. Layering small dependent pleural effusions; pleural fluid volumes appear  reduced. Cardiac and mediastinal structures unchanged. No pneumothorax. Results from [place] encounter on 05/14/22    CT ABD PELV WO CONT    Narrative  CT ABD PELV WO CONT    Technique: Noncontrasted CT scan of the abdomen and pelvis was performed  utilizing transaxial images obtained from the dome of the diaphragm to the pubic  symphysis. Coronal and sagittal reconstructions were generated. Dose Reduction:  All CT scans at this facility are performed using dose reduction optimization  techniques as appropriate to a performed exam including the following: Automated  exposure control, adjustments of the mA and/or kV according to patient size, or  use of iterative reconstruction technique. Comparison:  2/25/2022. Findings:  Small-moderate pleural effusions are present with bibasilar  atelectasis. Atherosclerotic calcifications are again evident including coronary  artery calcifications. Gastrostomy tube tip in the gastric antrum. The liver, spleen, pancreas, and  adrenal glands are unremarkable for noncontrasted technique. Stable lobulated  kidneys. Negative for hydronephrosis. Gallbladder is unremarkable. No biliary  duct dilatation apparent. The abdominal aorta is normal in caliber. There is no evidence of bowel obstruction. Small volume ascites has developed. The urinary bladder is decompressed by a Bell catheter. The uterus is  unremarkable. The appendix is normal.    There is mild body wall edema. No suspicious lytic or blastic lesion evident. Impression  Third spacing of fluids with small volume ascites, small-moderate  pleural effusions, and mild body wall edema. Bibasilar atelectasis.       5/20 intubated on ventilator we will continue current vent settings patient is supposed to go for surgery because of transaminitis elevated liver enzyme we will wait as per surgeon for sacral wound debridement and diverting loop colostomy off Levophed, improvement in AST and ALT  5/21 continue with the current vent settings ABG acceptable liver enzyme improving awaiting for the surgery  5/22 maintain ventilator as is in anticipation of surgery.   Renal function improved mild congestion on chest x-ray we will give 1 dose Lasix and potassium  5/23 patient is going for surgery today we will leave ventilator as it is INR is 1.5  5/25 patient remained on ventilator intubated doing well, patient received vitamin K fresh frozen plasma schedule for laparoscopic loop colostomy possible today  5/26 no surgery has been plans will start weaning do sedation vacation  Time of care 30 minutes

## 2022-05-26 NOTE — PROGRESS NOTES
Progress Note    Patient: Samantha Rowland MRN: [id number]  SSN: [id number]    YOB: 1945  Age: 78 y.o. Sex: female      Admit Date: 5/14/2022    LOS: 12 days     Subjective:   Patient followed for sacral wound infection with repeat culture growing Acinetobacter and Enterococci. Left foot wound also grew Acinetobacter, now status post left TMA. WBC procal and CRP remain elevated. She is currently on Vancomycin and Unasyn. Patient remains intubated in the ICU. Objective:     Vitals:    05/26/22 0600 05/26/22 0700 05/26/22 0737 05/26/22 0844   BP: (!) 115/42   (!) 142/57   Pulse: 82      Resp: 14      Temp:  98.1 °F (36.7 °C)     SpO2: 100%  100% 100%   Weight:       Height:            Intake and Output:  Current Shift: No intake/output data recorded. Last three shifts: 05/24 1901 - 05/26 0700  In: 2438.8 [I.V.:1558]  Out: 4951 [Urine:4950]    Physical Exam:    Vitals and nursing note reviewed. Constitutional:       General: She is not in acute distress. Appearance: She is ill-appearing. HENT: ET Tube  Eyes: closed   Cardiovascular:      Rate and Rhythm: Normal rate and regular rhythm. Heart sounds: No murmur heard. Pulmonary: diffuse rhonchi     Effort: Pulmonary effort is normal.      Breath sounds: Normal breath sounds. Abdominal:      General: Bowel sounds are normal.      Palpations: Abdomen is soft. Tenderness: There is no abdominal tenderness. Comments: PEG site unremarkable   Genitourinary:     Comments: Indwelling Bell with clear urine             Musculoskeletal:      Cervical back: Neck supple. Right lower leg: No edema. Left lower leg: No edema. Comments: Left foot surgical wound shows intact incision with nylon sutures, no erythema or drainage   Skin:     Findings: No rash.              Comments: Stage 3 sacral wound   Neurological: intubated   Psychiatric:  intubated      Lab/Data Review:     WBC 16,200  ALT/ /27    Procal 0.43 <0.45 <0.71 <1.02 <1.72 <2.32 <0.59 <0.57  CRP 15.80 <15.00 <13.60 < 13.90 <17.10 <18.00 <18.90 <17.90    Blood cultures (5/14) Coagulase negative Staphylococci  Sacral wound (5/14) Acinetobacter baumannii sensitive to Unasyn, Cefepime, Ceftazidime and Enterococcus faecium  Sacral wound (5/23) Few Gram negtive rods  Left great toe (5/15) Acinetobacter baumannii  Sputum culture (5/19) Rare normal respiratory yasmine FINAL    Assessment:     Active Problems:    Sacral ulcer ([address] (5/14/2022)    1. Sacral wound infection secondary now to Acinetobacter baumannii and Enterococcus faecium, on Vancomycin and Unasyn, status post excisional wound debridement to the bone. 2. Sepsis with persistent leukocytosis, elevated procal and CRP  3. Left foot wound, culture growing Acinetobacter baumannii, status post TMA  4. Possible ischemic hepatitiis with transaminitis following hypotensive episode, resolving  5. Positive blood cultures for Coagulase negative Staphylococci, probable contaminant  6. Hepatitis C antibody positive, resolved (negative HCV viral level)    Comment:  WBC and procal decreased slightly, but clinical response is suboptimal  Still with concern for possible resistant Gram negative rods or UTI. Plan:   1. Continue IV Unasyn and Vancomycin (for E. Faecium)  2. In am, repeat CBC, CRP, procal  3. Awaiting repeat urinalysis  4.  Follow-up  sacral wound culture    Signed By: Nakia Freitas MD     May 26, 2022

## 2022-05-26 NOTE — PROGRESS NOTES
Vancomycin Dosing Consult  Day #11 of vancomycin therapy  Consult ordered by Dr. Hannah Jean-Baptiste for this 78 y.o. female for indication of decubitus ulcer infection, sepsis. Antibiotic regimen: Vancomycin + Unasyn    Temp (24hrs), Av.3 °F (36.3 °C), Min:96.5 °F (35.8 °C), Max:98.1 °F (36.7 °C)    Recent Labs     22  0445 22  0601 22  0400   WBC 16.2* 16.8* 15.2*     Recent Labs     22  0445 22  0601 22  0400   CREA 0.76 0.72 0.91   BUN 32* 35* 45*     Recent Labs     22  0601 22  0400   CRP 15.80* 15.00* 13.60*     Recent Labs     22  0601 22  0400   PCT 0.43* 0.45* 0.71*       Estimated Creatinine Clearance: 73.9 mL/min (based on SCr of 0.76 mg/dL). ml/min  Concomitant nephrotoxic drugs: Norepinephrine     Cultures:    Wound: Moderate MDR Pseudomonas aeruginosa susceptible to Zerbaxa, aminoglycosides), moderate mixed skin yasmine, moderate mixed enteric yasmine including yeast, final   Blood: CoNS in the anaerobic bottle, final   Wound, ulcer:  Moderate Acinetobacter baumannii, moderate Enterococcus faecium, light >2 organisms (contraindicated), final  5/15 Wound, great toe: Heavy Acinetobacter baumannii complex (Zosyn resistant, susceptible to Unasyn), heavy Diphtheroids, final   Tissue: pending  MRSA Swab: Not ordered, patient already received first dose of vancomycin    Target range: Trough 10-15 mcg/mL   Last Level: 12.1 mcg/mL     Recent level history:  Date/Time Dose & Interval Measured Level (mcg/mL)    @ 0500 Held 17.6    @ 0400 500 mg X1 @ 1129 14.3    @ 0420 500 mg X1 @ 0957 15.5    held  -     @ 1056 500 mg  X1  12.1   22 @ 1600 750 mg X1 16.5     Wt Readings from Last 1 Encounters:   22 90.8 kg (200 lb 2.8 oz)   Ideal body weight: 61.4 kg (135 lb 7.1 oz)  Adjusted ideal body weight: 73.2 kg (161 lb 5.4 oz)      Assessment/Plan:   Afebrile , Sepsis with leukocytosis elevated procalcitonin and CRP  CKD,  Scr trending down  Level therapeutic today at 16.5. Will Give 750 mg vancomycin IV dose  today at 1600.   A random level has been scheduled for 5/28/22 at  0400 am.  Pharmacy to continue to monitor level and adjust   Antimicrobial stop date TBD

## 2022-05-26 NOTE — PROGRESS NOTES
Bedside and Verbal shift change report given to FISH Olivas (oncoming nurse) by Vanessa Mccrary (offgoing nurse). Report included the following information SBAR, Kardex, Procedure Summary, Intake/Output, MAR, Recent Results and Med Rec Status.

## 2022-05-26 NOTE — PROGRESS NOTES
Progress Note    Patient: Navya Shrestha MRN: [id number]  SSN: [id number]    YOB: 1945  Age: 78 y.o. Sex: female      Admit Date: 5/14/2022    LOS: 12 days     Subjective:     Patient seen and examined. This is a patient who is followed for new onset cardiomyopathy and elevated troponin following rapid decompensation prior to loop colostomy, sacral wound debridement, and amputation of left transmetatarsal.  Remains intubated/sedated with Propofol. 5/23 s/p Left transmetatarsal amputation and sacral wound excisional wound debridement. Possible plans for laparoscopic loop colostomy for fecal diversion per surgery. Intubated and sedated. Hemodynamically stable. Telemetry Review: Sinus rhythm; heart rate 100s. Review of Symptoms: intubated/sedated      Objective:     Vitals:    05/26/22 0700 05/26/22 0737 05/26/22 0844 05/26/22 0924   BP:   (!) 142/57    Pulse:    81   Resp:    14   Temp: 98.1 °F (36.7 °C)      SpO2:  100% 100% 100%   Weight:       Height:            Intake and Output:  Current Shift: No intake/output data recorded. Last three shifts: 05/24 1901 - 05/26 0700  In: 2438.8 [I.V.:1558]  Out: 4951 [Urine:4950]    Physical Exam:   General: Chronically ill appearing female lying in bed intubated/sedated, NAD  HEENT: Normocephalic, PERRL, no drainage  Neck: Supple, Trachea midline, No JVD  RESP: Coarse globally with diminished lower lobes. + Symmetrical chest movement. 30% FiO2. Intubated/Ventilated. Cardiovascular: RRR no RG. + murmur.   PVS: No rubor, cyanosis, mild pitting BLE edema, Radial, DP, PT pulses equal bilaterally  ABD: obese, soft, NT, Normoactive BS  Derm: +arterial line,+sacral wound vac  :+giraldo catheter  Neuro: Sedated with propofol, open eyes and followed simple command of blinking  PSYCH: No anxiety or agitation    Lab Results   Component Value Date/Time     (H) 05/26/2022 04:45 AM    K 3.3 (L) 05/26/2022 04:45 AM     (H) 05/26/2022 04:45 AM CO2 19 (L) 05/26/2022 04:45 AM    AGAP 9 05/26/2022 04:45 AM     (H) 05/26/2022 04:45 AM    BUN 32 (H) 05/26/2022 04:45 AM    CREA 0.76 05/26/2022 04:45 AM    GFRAA >60 05/26/2022 04:45 AM    GFRNA >60 05/26/2022 04:45 AM          Current Facility-Administered Medications:     vancomycin (VANCOCIN) 750 mg in 0.9% sodium chloride 250 mL ([id number]), 750 mg, IntraVENous, ONCE, Jatinder Clemons MD    [START ON 5/28/2022] Vancomycin random level to be drawn on 5/28/22 at 0400 am., , Other, ONCE, Jatinder Clemons MD    0.9% sodium chloride infusion 250 mL, 250 mL, IntraVENous, PRN, Shannan Bob MD    furosemide (LASIX) injection 20 mg, 20 mg, IntraVENous, Q12H, Shannan Bob MD, 20 mg at 05/26/22 1008    Vancomycin - Reminder to please draw level 5/26 @ 0400, , Other, Rx Dosing/Monitoring, Kg Aguero DO    phytonadione (vitamin K1) (AQUA-MEPHYTON) injection 10 mg, 10 mg, SubCUTAneous, DAILY, Linda Guzman MD, 10 mg at 05/26/22 1008    sodium chloride (NS) flush 5-40 mL, 5-40 mL, IntraVENous, Q8H, Shannan Bob MD, 10 mL at 05/26/22 0610    sodium chloride (NS) flush 5-40 mL, 5-40 mL, IntraVENous, PRN, Shannan Bob MD    ondansetron (ZOFRAN ODT) tablet 4 mg, 4 mg, Oral, Q8H PRN **OR** ondansetron (ZOFRAN) injection 4 mg, 4 mg, IntraVENous, Q6H PRN, Shannan Bob MD    enoxaparin (LOVENOX) injection 40 mg, 40 mg, SubCUTAneous, DAILY, Shannan Bob MD, 40 mg at 05/26/22 1008    dextrose 5% infusion, 30 mL/hr, IntraVENous, CONTINUOUS, Kalpesh Lara MD, Last Rate: 30 mL/hr at 05/25/22 1114, 30 mL/hr at 05/25/22 1114    insulin lispro (HUMALOG) injection, , SubCUTAneous, Q6H, Shannan Bob MD, 1 Units at 05/25/22 0008    glucose chewable tablet 16 g, 4 Tablet, Oral, PRN, Shannan Bob MD    glucagon [facility]) injection 1 mg, 1 mg, IntraMUSCular, PRN, Shannan Bob MD    dextrose 10% infusion 0-250 mL, 0-250 mL, IntraVENous, PRN, Shannan Bob MD    hydrALAZINE (APRESOLINE) tablet 12.5 mg, 12.5 mg, Oral, TID, Moshe Bob MD, 12.5 mg at 05/25/22 0802    sacubitriL-valsartan (ENTRESTO) 24-26 mg tablet 1 Tablet, 1 Tablet, Oral, Q12H, Moshe Bob MD, 1 Tablet at 05/25/22 2050    metoprolol succinate (TOPROL-XL) XL tablet 25 mg, 25 mg, Oral, DAILY, Moshe Bob MD, 25 mg at 05/25/22 0802    ampicillin-sulbactam (UNASYN) 3 g in 0.9% sodium chloride (MBP/ADV) 100 mL MBP, 3 g, IntraVENous, Q8H, Moshe Bob MD, Last Rate: 200 mL/hr at 05/26/22 0544, 3 g at 05/26/22 0544    dextrose 10% infusion 0-250 mL, 0-250 mL, IntraVENous, PRN, Moshe Bob MD, 125 mL at 05/19/22 0537    insulin glargine (LANTUS) injection 10 Units, 10 Units, SubCUTAneous, QHS, Moshe Bob MD, 10 Units at 05/25/22 2136    NOREPINephrine (LEVOPHED) 32,000 mcg in dextrose 5% 250 mL (128 mcg/mL) infusion, 2 mcg/min, IntraVENous, TITRATE, Moshe Bob MD, Last Rate: 0.5 mL/hr at 05/26/22 0843, 1 mcg/min at 05/26/22 0843    propofol (DIPRIVAN) 10 mg/mL infusion, 0-50 mcg/kg/min, IntraVENous, TITRATE, Moshe Bob MD, Last Rate: 14.3 mL/hr at 05/26/22 1006, 30 mcg/kg/min at 05/26/22 1006    0.9% sodium chloride infusion 250 mL, 250 mL, IntraVENous, PRN, Moshe Bob MD, Last Rate: 15 mL/hr at 05/22/22 0820, Rate Verify at 05/22/22 0820    sodium hypochlorite (HALF STRENGTH DAKIN'S) 0.25% irrigation (bottle), , Topical, BID, Moshe Bob MD, Given at 05/25/22 0805    Vancomycin Pharmacy Dosing, , Other, Rx Dosing/Monitoring, Moshe Bob MD    sodium chloride (NS) flush 5-40 mL, 5-40 mL, IntraVENous, Q8H, Moshe Bob MD, 10 mL at 05/26/22 6302    acetaminophen (TYLENOL) tablet 650 mg, 650 mg, Oral, Q6H PRN, 650 mg at 05/22/22 0556 **OR** acetaminophen (TYLENOL) suppository 650 mg, 650 mg, Rectal, Q6H PRN, Moshe Bob MD, 650 mg at 05/16/22 2229    polyethylene glycol (MIRALAX) packet 17 g, 17 g, Oral, DAILY PRN, Moshe Bob MD    ondansetron [facility] ODT) tablet 4 mg, 4 mg, Oral, Q8H PRN **OR** ondansetron (ZOFRAN) injection 4 mg, 4 mg, IntraVENous, Q6H PRN, Yarelis Bob MD    famotidine (PEPCID) tablet 20 mg, 20 mg, Oral, BID, Yarelis Bob MD, 20 mg at 05/26/22 1007    acidophilus-pectin, citrus tablet 2 Tablet, 2 Tablet, Oral, DAILY, Yarelis Bob MD, 2 Tablet at 05/26/22 1018    albuterol-ipratropium (DUO-NEB) 2.5 MG-0.5 MG/3 ML, 3 mL, Nebulization, Q6H PRN, Yarelis Bob MD    artificial saliva (MOUTH KOTE) 1 Spray, 1 Spray, Oral, TID, Yarelis Bob MD, 1 Spray at 05/26/22 1018    aspirin delayed-release tablet 81 mg, 81 mg, Oral, DAILY, Yarelis Bob MD, 81 mg at 05/26/22 1018    brimonidine (ALPHAGAN) 0.2 % ophthalmic solution 1 Drop, 1 Drop, Both Eyes, TID, Yarelis Bob MD, 1 Drop at 05/25/22 2125    cholestyramine-aspartame (QUESTRAN LIGHT) packet 4 g, 4 g, Oral, BID WITH MEALS, Yarelis Bob MD, 4 g at 05/25/22 0801    [Held by provider] fenofibrate nanocrystallized (TRICOR) tablet 48 mg, 48 mg, Oral, DAILY, Yarelis Bob MD, 48 mg at 05/19/22 0670    ferrous sulfate tablet 325 mg, 325 mg, Oral, BID, Yarelis Bob MD, 325 mg at 05/26/22 1007    gabapentin (NEURONTIN) capsule 300 mg, 300 mg, Oral, BID, Yarelis Bob MD, 300 mg at 05/26/22 1007    insulin glargine (LANTUS) injection 50 Units, 50 Units, SubCUTAneous, DAILY, Yarelis Bob MD, 50 Units at 05/25/22 0801    latanoprost (XALATAN) 0.005 % ophthalmic solution 1 Drop, 1 Drop, Right Eye, QPM, Yarelis Bob MD, 1 Drop at 05/25/22 1730    traMADoL (ULTRAM) tablet 25 mg, 25 mg, Oral, Q12H PRN, Paulino, Yarelis Perez MD, 25 mg at 05/18/22 0056      Assessment:     Active Problems:    Sacral ulcer ([address] (5/14/2022)        Plan:     Case discussed with Collaborating physician Dr. Schuyler Thorne and our recommendations are as follows:     1.  New onset cardiomyopathy:    - Echo 5/18/22 showing EF of 20-25% down from 50-55% in 4/2022.    - Continue Entresto,  hydralazine to 12.5mg TID, metoprolol 25 mg daily.  -  Recommend ischemic evaluation once patient is clinically stable. Deferred at this time due to patient's acuity and need for other surgical interventions. - The patient is considered high risk for upcoming procedure. - Discussed the potential cardiac risks involved with surgical interventions with the patient's family and they have requested to proceed with the surgery. 2. Elevated troponin:    - HS troponin peaked at 890, currently 275. -  Hgb 9.0, stable at this time.    - Continue BB. -  Ischemic evaluation pending post- op clinical course.    - No statin given LFTs elevated likely due to shock. 3.   NSVT:   - No reoccurrence.    - Continue beta blocker.   - Maintain lytes. Thank you for involving us in the care of this patient. Please do not hesitate to call if additional questions arise. If after hours please call [phone]. Signed By: Pretty Gallardo NP     May 26, 2022            [facility]  [address] [place] [address] [place] , [address] [facility]  [phone]

## 2022-05-26 NOTE — PROGRESS NOTES
General Daily Progress Note          Patient Name:   Samantha Rowland       YOB: 1945       Age:  78 y.o. 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 Cipro    Admitted again yesterday concerning for worsening of sacral ulcer    During last admission patient was seen by infectious disease and also seen by the vascular surgeon patient had debridement of wound during the last admission    Patient seen by the infectious disease yesterday    Sacral wound infection recent cultures growing Pseudomonas resistant to Zosyn and meropenem    5/17    Patient alert awake not in distress  Patient was seen by the vascular surgeon    Vascular surgery planned for laparoscopic loop colostomy placement and sacral debridement then wound vacuum  Also try to close the wound on the left foot with TMA    5/18    Resting in the bed not in any distress  Blood sugars running high    Seen by infectious disease continue vancomycin and start on Unasyn    5/19    And went to the OR intubated because of elevated LFT and elevated INR  Surgery was canceled    On ventilator 40% O2  Propofol drip    5/20    Patient on ventilator with 30% O2  On propofol drip    Hemoglobin dropped to 6.8    Patient's daughter at the bedside    5/20    Patient on ventilator 30% O2  On propofol drip  Getting PEG tube feeding    5/21    Patient still on ventilator 30% O2  On propofol drip  Liver Function improving    5/22  On ventilator with 30% O2 on propofol drip    5/24  Awaiting tissue culture results.    On ventilator with 30% O2 on propofol drip  Left transmetatarsal amputation and Sacral wound excisional wound debridement 13 x 15 cm to the bone completed yesterday. CXR: Hazy mid and lower lung reticular markings.  Dependent small to moderate volume,  right greater than left pleural effusions  Remarkable labs   WBC: 15.2   Hgb: 8.5   Na; 148  CRP: 13.60   Procal: 0.71      5/25    Patient on ventilator with 30% O2  Seen by the vascular surgeon and plan for surgery today    5/26  Patient on ventilator with 30% O2  Hypotensive on Levophed  On propofol drip    Surgery was canceled yesterday because patient unstable hemodynamically not cleared by the anesthesia    Objective:     Visit Vitals  BP (!) 142/57   Pulse 81   Temp 98.1 °F (36.7 °C)   Resp 14   Ht 5' 6.93\" (1.7 m)   Wt 90.8 kg (200 lb 2.8 oz)   SpO2 100%   Breastfeeding No   BMI 31.42 kg/m²        Recent Results (from the past 24 hour(s))   TROPONIN-HIGH SENSITIVITY    Collection Time: 05/25/22 11:15 AM   Result Value Ref Range    Troponin-High Sensitivity 276 (HH) 0 - 51 ng/L   NT-PRO BNP    Collection Time: 05/25/22 11:15 AM   Result Value Ref Range    NT pro-BNP 8,859 (H) <450 pg/mL   PROTHROMBIN TIME + INR    Collection Time: 05/25/22 11:15 AM   Result Value Ref Range    Prothrombin time 17.8 (H) 11.9 - 14.6 sec    INR 1.5 (H) 0.9 - 1.1     GLUCOSE, POC    Collection Time: 05/25/22 11:33 AM   Result Value Ref Range    Glucose (POC) 121 (H) 65 - 117 mg/dL    Performed by Donnell Prim    GLUCOSE, POC    Collection Time: 05/25/22  5:12 PM   Result Value Ref Range    Glucose (POC) 77 65 - 117 mg/dL    Performed by Donnell Prim    ECHO ADULT FOLLOW-UP OR LIMITED    Collection Time: 05/25/22  5:26 PM   Result Value Ref Range    LV EDV A4C 129 mL    LV ESV A4C 66 mL    IVSd 1.5 (A) 0.6 - 0.9 cm    LVIDd 4.0 3.9 - 5.3 cm    LVIDs 3.1 cm    LVPWd 1.5 (A) 0.6 - 0.9 cm    LV Ejection Fraction A4C 49 %    Aortic Root 2.7 cm    LA Diameter 3.3 cm    Fractional Shortening 2D 23 28 - 44 %    LV ESV Index A4C 33 mL/m2    LV EDV Index A4C 64 mL/m2    LVIDd Index 1.98 cm/m2    LVIDs Index 1.53 cm/m2    LV RWT Ratio 0.75     LV Mass 2D 232.7 (A) 67 - 162 g    LV Mass 2D Index 115.2 (A) 43 - 95 g/m2    LA Size Index 1.63 cm/m2    LA/AO Root Ratio 1.22     Ao Root Index 1.34 cm/m2    EF BP 45 (A) 55 - 100 %   GLUCOSE, POC    Collection Time: 05/25/22  9:35 PM   Result Value Ref Range    Glucose (POC) 79 65 - 117 mg/dL    Performed by [address] [place] [address], POC    Collection Time: 05/25/22 11:05 PM   Result Value Ref Range    Glucose (POC) 94 65 - 117 mg/dL    Performed by Yenny Marlow    METABOLIC PANEL, COMPREHENSIVE    Collection Time: 05/26/22  4:45 AM   Result Value Ref Range    Sodium 147 (H) 136 - 145 mmol/L    Potassium 3.3 (L) 3.5 - 5.1 mmol/L    Chloride 119 (H) 97 - 108 mmol/L    CO2 19 (L) 21 - 32 mmol/L    Anion gap 9 5 - 15 mmol/L    Glucose 104 (H) 65 - 100 mg/dL    BUN 32 (H) 6 - 20 mg/dL    Creatinine 0.76 0.55 - 1.02 mg/dL    BUN/Creatinine ratio 42 (H) 12 - 20      GFR est AA >60 >60 ml/min/1.73m2    GFR est non-AA >60 >60 ml/min/1.73m2    Calcium 8.5 8.5 - 10.1 mg/dL    Bilirubin, total 0.5 0.2 - 1.0 mg/dL    AST (SGOT) 27 15 - 37 U/L    ALT (SGPT) 187 (H) 12 - 78 U/L    Alk.  phosphatase 92 45 - 117 U/L    Protein, total 6.0 (L) 6.4 - 8.2 g/dL    Albumin 1.6 (L) 3.5 - 5.0 g/dL    Globulin 4.4 (H) 2.0 - 4.0 g/dL    A-G Ratio 0.4 (L) 1.1 - 2.2     VANCOMYCIN, RANDOM    Collection Time: 05/26/22  4:45 AM   Result Value Ref Range    Vancomycin, random 16.5 ug/mL   C REACTIVE PROTEIN, QT    Collection Time: 05/26/22  4:45 AM   Result Value Ref Range    C-Reactive protein 15.80 (H) 0.00 - 0.60 mg/dL   CBC WITH AUTOMATED DIFF    Collection Time: 05/26/22  4:45 AM   Result Value Ref Range    WBC 16.2 (H) 3.6 - 11.0 K/uL    RBC 3.19 (L) 3.80 - 5.20 M/uL    HGB 9.0 (L) 11.5 - 16.0 g/dL    HCT 28.4 (L) 35.0 - 47.0 %    MCV 89.0 80.0 - 99.0 FL    MCH 28.2 26.0 - 34.0 PG    MCHC 31.7 30.0 - 36.5 g/dL    RDW 17.1 (H) 11.5 - 14.5 %    PLATELET 275 150 - 400 K/uL    MPV 11.2 8.9 - 12.9 FL    NRBC 0.0 0.0  WBC    ABSOLUTE NRBC 0.00 0.00 - 0.01 K/uL    NEUTROPHILS 76 (H) 32 - 75 %    LYMPHOCYTES 13 12 - 49 %    MONOCYTES 6 5 - 13 %    EOSINOPHILS 3 0 - 7 %    BASOPHILS 1 0 - 1 %    IMMATURE GRANULOCYTES 1 (H) 0 - 0.5 %    ABS. NEUTROPHILS 12.3 (H) 1.8 - 8.0 K/UL    ABS. LYMPHOCYTES 2.1 0.8 - 3.5 K/UL    ABS. MONOCYTES 0.9 0.0 - 1.0 K/UL    ABS. EOSINOPHILS 0.5 (H) 0.0 - 0.4 K/UL    ABS. BASOPHILS 0.1 0.0 - 0.1 K/UL    ABS. IMM. GRANS. 0.2 (H) 0.00 - 0.04 K/UL    DF AUTOMATED     PROCALCITONIN    Collection Time: 05/26/22  4:45 AM   Result Value Ref Range    Procalcitonin 0.43 (H) 0 ng/mL   BLOOD GAS, ARTERIAL    Collection Time: 05/26/22  5:30 AM   Result Value Ref Range    pH 7.36 7.35 - 7.45      PCO2 30 (L) 35 - 45 mmHg    PO2 94 75 - 100 mmHg    O2 SAT 97 >95 %    BICARBONATE 18 (L) 22 - 26 mmol/L    BASE DEFICIT 7.3 (H) 0 - 2 mmol/L    O2 METHOD VENT      FIO2 30 %    MODE Assist Control/Volume Control      Tidal volume 500      SET RATE 12      EPAP/CPAP/PEEP 5      Sample source Arterial      SITE Right Radial      EVELIN'S TEST PASS     GLUCOSE, POC    Collection Time: 05/26/22  5:57 AM   Result Value Ref Range    Glucose (POC) 105 65 - 117 mg/dL    Performed by Bibi Ng      [unfilled]      Review of Systems    Not a good historian e. Physical Exam:      Constitutional: On ventilator  HENT:   Head: Normocephalic and atraumatic. Eyes: Pupils are equal, round, and reactive to light. EOM are normal.   Cardiovascular: Normal rate, regular rhythm and normal heart sounds. Pulmonary/Chest: Breath sounds normal. No wheezes. No rales. Exhibits no tenderness. Abdominal: Soft. Bowel sounds are normal. There is no abdominal tenderness. There is no rebound and no guarding. Musculoskeletal: Normal range of motion.    Neurological: On ventilator   skin sacral decubitus ulcer and left foot wound right big toe amputation    XR CHEST PORT Final Result      XR CHEST PORT   Final Result      CT ABD PELV WO CONT   Final Result   Third spacing of fluids with small volume ascites, small-moderate   pleural effusions, and mild body wall edema. Bibasilar atelectasis. XR CHEST PORT   Final Result   Similar findings compared to single view chest 1 day earlier. XR CHEST PORT   Final Result   Findings/IMPRESSION: Stable appearance of endotracheal tube. Stable mild   enlargement of cardiomediastinal silhouette. Central vascular congestion with   bilateral perihilar and basilar opacities, consistent with edema and/or   pneumonia, right greater than left. Possible right pleural effusion. No   pneumothorax. XR CHEST PORT   Final Result   Bibasilar opacities, possibly increased on the right. XR CHEST PORT   Final Result   No significant change allowing for difference in technique and   positioning. Single portable AP view compared to yesterday. Suboptimal inspiratory film   compromises visualization of the lower lung fields. Monitoring equipment   overlies the body. The tip of the tube is approximately 3 cm above the jennifer. Mild apparent cardiomegaly. Left heart border and left diaphragm obscured. Hazy airspace opacification both lung bases may be a combination of atelectasis,   pneumonia, or edema. No large effusion. Negative for pneumothorax. XR CHEST PORT   Final Result   No significant change allowing for difference in technique and   positioning. Single portable AP view compared to yesterday. Rotation to the right. Monitoring   equipment overlies the body. Suboptimal inspiratory film compromises   visualization of the lower lung fields. Mild cardiomegaly. The tip of the ET tube is approximately 3 cm above the jennifer. Mild basal interstitial edema. No new consolidation. No pleural effusion or   pneumothorax. XR CHEST PORT   Final Result   1. The endotracheal tube is in satisfactory position.    2.  There has been a partial interval resolution in the pulmonary interstitial   edema pattern. XR CHEST PORT   Final Result   Ill-defined haziness in the mid to lower lung zones suspect for mild   atelectatic changes and/or interstitial fluid or pleural fluid. US ABD LTD   Final Result   1. Question pericholecystic fluid. No identified gallstones or sludge. 2. Right pleural effusion. 3. Increased right renal echotexture for medical renal disease. XR CHEST PORT   Final Result   Intubation. Findings suggesting hydrostatic edema. XR CHEST PORT   Final Result   No evidence of an acute cardiopulmonary process.       XR CHEST PORT    (Results Pending)        Recent Results (from the past 24 hour(s))   TROPONIN-HIGH SENSITIVITY    Collection Time: 05/25/22 11:15 AM   Result Value Ref Range    Troponin-High Sensitivity 276 (HH) 0 - 51 ng/L   NT-PRO BNP    Collection Time: 05/25/22 11:15 AM   Result Value Ref Range    NT pro-BNP 8,859 (H) <450 pg/mL   PROTHROMBIN TIME + INR    Collection Time: 05/25/22 11:15 AM   Result Value Ref Range    Prothrombin time 17.8 (H) 11.9 - 14.6 sec    INR 1.5 (H) 0.9 - 1.1     GLUCOSE, POC    Collection Time: 05/25/22 11:33 AM   Result Value Ref Range    Glucose (POC) 121 (H) 65 - 117 mg/dL    Performed by Robert Fly    GLUCOSE, POC    Collection Time: 05/25/22  5:12 PM   Result Value Ref Range    Glucose (POC) 77 65 - 117 mg/dL    Performed by Robert Fly    ECHO ADULT FOLLOW-UP OR LIMITED    Collection Time: 05/25/22  5:26 PM   Result Value Ref Range    LV EDV A4C 129 mL    LV ESV A4C 66 mL    IVSd 1.5 (A) 0.6 - 0.9 cm    LVIDd 4.0 3.9 - 5.3 cm    LVIDs 3.1 cm    LVPWd 1.5 (A) 0.6 - 0.9 cm    LV Ejection Fraction A4C 49 %    Aortic Root 2.7 cm    LA Diameter 3.3 cm    Fractional Shortening 2D 23 28 - 44 %    LV ESV Index A4C 33 mL/m2    LV EDV Index A4C 64 mL/m2    LVIDd Index 1.98 cm/m2    LVIDs Index 1.53 cm/m2    LV RWT Ratio 0.75     LV Mass 2D 232.7 (A) 67 - 162 g    LV Mass 2D Index 115.2 (A) 43 - 95 g/m2    LA Size Index 1.63 cm/m2    LA/AO Root Ratio 1.22     Ao Root Index 1.34 cm/m2    EF BP 45 (A) 55 - 100 %   GLUCOSE, POC    Collection Time: 05/25/22  9:35 PM   Result Value Ref Range    Glucose (POC) 79 65 - 117 mg/dL    Performed by [address] [place] [address], POC    Collection Time: 05/25/22 11:05 PM   Result Value Ref Range    Glucose (POC) 94 65 - 117 mg/dL    Performed by [facility]    METABOLIC PANEL, COMPREHENSIVE    Collection Time: 05/26/22  4:45 AM   Result Value Ref Range    Sodium 147 (H) 136 - 145 mmol/L    Potassium 3.3 (L) 3.5 - 5.1 mmol/L    Chloride 119 (H) 97 - 108 mmol/L    CO2 19 (L) 21 - 32 mmol/L    Anion gap 9 5 - 15 mmol/L    Glucose 104 (H) 65 - 100 mg/dL    BUN 32 (H) 6 - 20 mg/dL    Creatinine 0.76 0.55 - 1.02 mg/dL    BUN/Creatinine ratio 42 (H) 12 - 20      GFR est AA >60 >60 ml/min/1.73m2    GFR est non-AA >60 >60 ml/min/1.73m2    Calcium 8.5 8.5 - 10.1 mg/dL    Bilirubin, total 0.5 0.2 - 1.0 mg/dL    AST (SGOT) 27 15 - 37 U/L    ALT (SGPT) 187 (H) 12 - 78 U/L    Alk.  phosphatase 92 45 - 117 U/L    Protein, total 6.0 (L) 6.4 - 8.2 g/dL    Albumin 1.6 (L) 3.5 - 5.0 g/dL    Globulin 4.4 (H) 2.0 - 4.0 g/dL    A-G Ratio 0.4 (L) 1.1 - 2.2     VANCOMYCIN, RANDOM    Collection Time: 05/26/22  4:45 AM   Result Value Ref Range    Vancomycin, random 16.5 ug/mL   C REACTIVE PROTEIN, QT    Collection Time: 05/26/22  4:45 AM   Result Value Ref Range    C-Reactive protein 15.80 (H) 0.00 - 0.60 mg/dL   CBC WITH AUTOMATED DIFF    Collection Time: 05/26/22  4:45 AM   Result Value Ref Range    WBC 16.2 (H) 3.6 - 11.0 K/uL    RBC 3.19 (L) 3.80 - 5.20 M/uL    HGB 9.0 (L) 11.5 - 16.0 g/dL    HCT 28.4 (L) 35.0 - 47.0 %    MCV 89.0 80.0 - 99.0 FL    MCH 28.2 26.0 - 34.0 PG    MCHC 31.7 30.0 - 36.5 g/dL    RDW 17.1 (H) 11.5 - 14.5 %    PLATELET 468 712 - 027 K/uL    MPV 11.2 8.9 - 12.9 FL    NRBC 0.0 0.0  WBC    ABSOLUTE NRBC 0.00 0.00 - 0.01 K/uL    NEUTROPHILS 76 (H) 32 - 75 %    LYMPHOCYTES 13 12 - 49 %    MONOCYTES 6 5 - 13 %    EOSINOPHILS 3 0 - 7 %    BASOPHILS 1 0 - 1 %    IMMATURE GRANULOCYTES 1 (H) 0 - 0.5 %    ABS. NEUTROPHILS 12.3 (H) 1.8 - 8.0 K/UL    ABS. LYMPHOCYTES 2.1 0.8 - 3.5 K/UL    ABS. MONOCYTES 0.9 0.0 - 1.0 K/UL    ABS. EOSINOPHILS 0.5 (H) 0.0 - 0.4 K/UL    ABS. BASOPHILS 0.1 0.0 - 0.1 K/UL    ABS. IMM.  GRANS. 0.2 (H) 0.00 - 0.04 K/UL    DF AUTOMATED     PROCALCITONIN    Collection Time: 05/26/22  4:45 AM   Result Value Ref Range    Procalcitonin 0.43 (H) 0 ng/mL   BLOOD GAS, ARTERIAL    Collection Time: 05/26/22  5:30 AM   Result Value Ref Range    pH 7.36 7.35 - 7.45      PCO2 30 (L) 35 - 45 mmHg    PO2 94 75 - 100 mmHg    O2 SAT 97 >95 %    BICARBONATE 18 (L) 22 - 26 mmol/L    BASE DEFICIT 7.3 (H) 0 - 2 mmol/L    O2 METHOD VENT      FIO2 30 %    MODE Assist Control/Volume Control      Tidal volume 500      SET RATE 12      EPAP/CPAP/PEEP 5      Sample source Arterial      SITE Right Radial      EVELIN'S TEST PASS     GLUCOSE, POC    Collection Time: 05/26/22  5:57 AM   Result Value Ref Range    Glucose (POC) 105 65 - 117 mg/dL    Performed by Geovany Perea        Results     Procedure Component Value Units Date/Time    CULTURE, TISSUE Zoe Saturnino STAIN [[id number]] Collected: 05/23/22 1815    Order Status: Completed Specimen: Tissue Updated: 05/25/22 1239     Special Requests: No Special Requests        GRAM STAIN Rare WBCs seen         Few Gram Negative Rods        Culture result: Heavy  Mixed skin yasmine isolated       CULTURE, RESPIRATORY/SPUTUM/BRONCH Zoe Saturnino STAIN [[id number]] Collected: 05/19/22 1448    Order Status: Completed Specimen: Sputum Updated: 05/21/22 0813     Special Requests: No Special Requests        GRAM STAIN 1+ WBCs seen         no epithelial cells seen         No organisms seen        Culture result:       Rare Normal respiratory yasmine          CULTURE, Eladio Sprague STAIN [[id number]]  (Susceptibility) Collected: 05/15/22 1915    Order Status: Completed Specimen: Wound from Great Toe Updated: 05/18/22 0835     Special Requests: No Special Requests        GRAM STAIN       3+ Gram Positive Rods (Coryneform)                  1+ apparent Gram Negative Rods           Culture result:       Heavy Acinetobacter baumannii complex            Heavy Diphtheroids       Susceptibility      Acinetobacter baumannii complex     GEMA     Ampicillin/sulbactam ($) Susceptible     Cefepime ($$) Susceptible     Ceftazidime ($) Susceptible     Ceftriaxone ($) Intermediate     Ciprofloxacin ($) Resistant     Gentamicin ($) Susceptible     Levofloxacin ($) Resistant     Meropenem ($$) Resistant     Piperacillin/Tazobac ($) Resistant     Tobramycin ($) Susceptible     Trimeth/Sulfa Susceptible                  Linear View                   CULTURE, Audubon Smiling STAIN [[id number]]  (Susceptibility) Collected: 05/14/22 0314    Order Status: Completed Specimen: Wound from Ulcer Updated: 05/18/22 0841     Special Requests: No Special Requests        GRAM STAIN Few WBCs seen         2+ Gram Negative Rods               Occasional Gram Positive Cocci in pairs            Rare Gram Positive Rods        Culture result: Moderate Acinetobacter baumannii complex                  Moderate Enterococcus faecium                  Light >2 organisms - contaminated specimen.  suggest recollection Anaerobic gram negative rods Beta Lactamase Positive          Susceptibility      Acinetobacter baumannii complex     GEMA     Ampicillin/sulbactam ($) Susceptible     Cefepime ($$) Susceptible     Ceftazidime ($) Susceptible     Ceftriaxone ($) Intermediate     Ciprofloxacin ($) Resistant     Gentamicin ($) Susceptible     Levofloxacin ($) Resistant     Meropenem ($$) Resistant     Piperacillin/Tazobac ($) Resistant     Tobramycin ($) Susceptible     Trimeth/Sulfa Susceptible                  Linear View               Susceptibility      Enterococcus faecium     GEMA     Ampicillin ($) Resistant Daptomycin ($$$$$)  See below  [1]      Linezolid ($$$$$) Susceptible     Vancomycin ($) Susceptible                 [1]  Dose Dependent  (SENSITIVITIES PERFORMED BY E-TEST)          Linear View                   CULTURE, BLOOD, PAIRED [[id number]]  (Abnormal) Collected: 05/14/22 2000    Order Status: Completed Specimen: Blood Updated: 05/17/22 1212     Special Requests: No Special Requests        Culture result:       Staphylococcus species, coagulase negative GROWING IN THE ANAEROBIC BOTTLE. No Site Indicated            (NOTE) PRELIMINARY REPORT OF GRAM POSITIVE COCCI IN CLUSTERS GROWING IN THE ANAEROBIC BOTTLE, CALLED TO AND READ BACK BY CANDI KNOWLES 5/16/22 1118 SCP. Labs:     Recent Labs     05/26/22  0445 05/25/22  0601   WBC 16.2* 16.8*   HGB 9.0* 10.0*   HCT 28.4* 31.4*    289     Recent Labs     05/26/22  0445 05/25/22  0601 05/24/22  0400   * 145 148*   K 3.3* 3.4* 3.7   * 119* 123*   CO2 19* 18* 18*   BUN 32* 35* 45*   CREA 0.76 0.72 0.91   * 156* 142*   CA 8.5 8.7 8.1*   MG  --  2.2  --    PHOS  --  3.3  --      Recent Labs     05/26/22  0445 05/25/22  0601 05/24/22  0400   * 276* 325*   AP 92 96 83   TBILI 0.5 0.5 0.5   TP 6.0* 7.2 6.1*   ALB 1.6* 1.6* 1.4*   GLOB 4.4* 5.6* 4.7*     Recent Labs     05/25/22  1115 05/25/22  0745 05/24/22  0400   INR 1.5* 1.6* 1.6*   PTP 17.8* 18.8* 19.4*      No results for input(s): FE, TIBC, PSAT, FERR in the last 72 hours. No results found for: FOL, RBCF   Recent Labs     05/26/22  0530 05/25/22  0550   PH 7.36 7.33*   PCO2 30* 29*   PO2 94 121*     No results for input(s): CPK, CKNDX, TROIQ in the last 72 hours.     No lab exists for component: CPKMB  Lab Results   Component Value Date/Time    Cholesterol, total 124 03/08/2022 07:40 AM    HDL Cholesterol 30 03/08/2022 07:40 AM    LDL,Direct 79 06/28/2021 12:00 AM    LDL, calculated 44.8 03/08/2022 07:40 AM    Triglyceride 202 (H) 05/16/2022 06:20 AM    CHOL/HDL Ratio 4.1 03/08/2022 07:40 AM     Lab Results   Component Value Date/Time    Glucose (POC) 105 05/26/2022 05:57 AM    Glucose (POC) 94 05/25/2022 11:05 PM    Glucose (POC) 79 05/25/2022 09:35 PM    Glucose (POC) 77 05/25/2022 05:12 PM    Glucose (POC) 121 (H) 05/25/2022 11:33 AM     Lab Results   Component Value Date/Time    Color Yellow/Straw 05/14/2022 08:08 PM    Appearance Clear 05/14/2022 08:08 PM    Specific gravity 1.014 05/14/2022 08:08 PM    pH (UA) 6.0 05/14/2022 08:08 PM    Protein 30 (A) 05/14/2022 08:08 PM    Glucose 50 (A) 05/14/2022 08:08 PM    Ketone Negative 05/14/2022 08:08 PM    Bilirubin Negative 05/14/2022 08:08 PM    Urobilinogen 0.1 05/14/2022 08:08 PM    Nitrites Negative 05/14/2022 08:08 PM    Leukocyte Esterase Trace (A) 05/14/2022 08:08 PM    Bacteria Negative 05/14/2022 08:08 PM    Bacteria Rare (A) 05/14/2022 08:08 PM    WBC 0-4 05/14/2022 08:08 PM    WBC 4 05/14/2022 08:08 PM    RBC 0-5 05/14/2022 08:08 PM    RBC 1 05/14/2022 08:08 PM         Assessment:   Acute respiratory failure on ventilator 30% O2  Sacral decubitus ulcer postdebridement  Sepsis with leukocytosis elevated procalcitonin and CRP  Left foot wound  Recent removal of left great toe and second toe  CVA with PEG tube placement  History of aspiration pneumonia  History of chronic kidney disease  CAD with ejection fraction about 20 to 25%  Hypertension  Hyperlipidemia  Anemia of chronic disease  Hyperkalemia  Static post transfusion  Uncontrolled diabetes  Hepatic shock/improving  Coagulopathy  Elevated  Troponin  Bacteremia with coagulase-negative Staphylococcus  Procedure:  1. Left transmetatarsal amputation. 2.  Sacral wound excisional wound debridement 13 x 15 cm to the bone.       Plan:     Unasyn 3 g every 8 hours  Aspirin 81 mg daily  Questran 4 g twice a day  Pepcid 20 twice daily  Ferrous sulfate 325 mg twice a day  Questran 4 g twice a day  Pepcid 20 twice daily  Ferrous sulfate 325 mg twice a day  Lantus 50 units subcu daily and 10 units in p.m.   Gabapentin 300 mg twice a day  Hydralazine 12.5 mg 3 times a day  Metoprolol 25 daily  Vitamin K 10 mg daily  Replace potassium  Entresto 1 tablet twice a day  Vancomycin with pharmacy protocol        Repeat  the labs overall prognosis very poor        laparoscopic loop colostomy for fecal diversion canceled         Current Facility-Administered Medications:     vancomycin (VANCOCIN) 750 mg in 0.9% sodium chloride 250 mL ([id number]), 750 mg, IntraVENous, ONCE, Abi Meyer MD    [START ON 5/28/2022] Vancomycin random level to be drawn on 5/28/22 at 0400 am., , Other, Ras Kellogg MD    0.9% sodium chloride infusion 250 mL, 250 mL, IntraVENous, PRN, Paulino, Kadeem Ureña MD    furosemide (LASIX) injection 20 mg, 20 mg, IntraVENous, Q12H, Kadeem Bob MD, 20 mg at 05/25/22 2056    Vancomycin - Reminder to please draw level 5/26 @ 0400, , Other, Rx Dosing/Monitoring, Jennifer Gomez,     phytonadione (vitamin K1) (AQUA-MEPHYTON) injection 10 mg, 10 mg, SubCUTAneous, DAILY, Lupe Perez MD, 10 mg at 05/25/22 2035    sodium chloride (NS) flush 5-40 mL, 5-40 mL, IntraVENous, Q8H, Kadeem Bob MD, 10 mL at 05/26/22 0610    sodium chloride (NS) flush 5-40 mL, 5-40 mL, IntraVENous, PRN, Kadeem Bob MD    ondansetron (ZOFRAN ODT) tablet 4 mg, 4 mg, Oral, Q8H PRN **OR** ondansetron (ZOFRAN) injection 4 mg, 4 mg, IntraVENous, Q6H PRN, Kadeem Bob MD    enoxaparin (LOVENOX) injection 40 mg, 40 mg, SubCUTAneous, DAILY, Kadeem Bob MD, 40 mg at 05/25/22 0801    dextrose 5% infusion, 30 mL/hr, IntraVENous, CONTINUOUS, Kalpesh Lara MD, Last Rate: 30 mL/hr at 05/25/22 1114, 30 mL/hr at 05/25/22 1114    insulin lispro (HUMALOG) injection, , SubCUTAneous, Q6H, Kadeem Bob MD, 1 Units at 05/25/22 0008    glucose chewable tablet 16 g, 4 Tablet, Oral, PRN, Kadeem Bob MD    glucagon [facility]) injection 1 mg, 1 mg, IntraMUSCular, PRN, Kadeem Bob, MD    dextrose 10% infusion 0-250 mL, 0-250 mL, IntraVENous, PRN, Hazel Bob MD    hydrALAZINE (APRESOLINE) tablet 12.5 mg, 12.5 mg, Oral, TID, Haezl Bob MD, 12.5 mg at 05/25/22 0802    sacubitriL-valsartan (ENTRESTO) 24-26 mg tablet 1 Tablet, 1 Tablet, Oral, Q12H, Hazel Bob MD, 1 Tablet at 05/25/22 2050    metoprolol succinate (TOPROL-XL) XL tablet 25 mg, 25 mg, Oral, DAILY, Hazel Bob MD, 25 mg at 05/25/22 0802    ampicillin-sulbactam (UNASYN) 3 g in 0.9% sodium chloride (MBP/ADV) 100 mL MBP, 3 g, IntraVENous, Q8H, Hazel Bob MD, Last Rate: 200 mL/hr at 05/26/22 0544, 3 g at 05/26/22 0544    dextrose 10% infusion 0-250 mL, 0-250 mL, IntraVENous, PRN, Hazel Bob MD, 125 mL at 05/19/22 0537    insulin glargine (LANTUS) injection 10 Units, 10 Units, SubCUTAneous, QHS, Hazel Bob MD, 10 Units at 05/25/22 2136    NOREPINephrine (LEVOPHED) 32,000 mcg in dextrose 5% 250 mL (128 mcg/mL) infusion, 2 mcg/min, IntraVENous, TITRATE, Hazel Bob MD, Last Rate: 0.5 mL/hr at 05/26/22 0843, 1 mcg/min at 05/26/22 0843    propofol (DIPRIVAN) 10 mg/mL infusion, 0-50 mcg/kg/min, IntraVENous, TITRATE, Hazel Bob MD, Last Rate: 16.7 mL/hr at 05/26/22 0855, 35 mcg/kg/min at 05/26/22 0855    0.9% sodium chloride infusion 250 mL, 250 mL, IntraVENous, PRN, Hazel Bob MD, Last Rate: 15 mL/hr at 05/22/22 0820, Rate Verify at 05/22/22 0820    sodium hypochlorite (HALF STRENGTH DAKIN'S) 0.25% irrigation (bottle), , Topical, BID, Hazel Bob MD, Given at 05/25/22 0805    Vancomycin Pharmacy Dosing, , Other, Rx Dosing/Monitoring, Hazel Bob MD    sodium chloride (NS) flush 5-40 mL, 5-40 mL, IntraVENous, Q8H, Hazel Bob MD, 10 mL at 05/26/22 0610    acetaminophen (TYLENOL) tablet 650 mg, 650 mg, Oral, Q6H PRN, 650 mg at 05/22/22 0556 **OR** acetaminophen (TYLENOL) suppository 650 mg, 650 mg, Rectal, Q6H PRN, Hazel Bob MD, 650 mg at 05/16/22 2223    polyethylene glycol (MIRALAX) packet 17 g, 17 g, Oral, DAILY PRN, Niraj Bob MD    ondansetron (ZOFRAN ODT) tablet 4 mg, 4 mg, Oral, Q8H PRN **OR** ondansetron (ZOFRAN) injection 4 mg, 4 mg, IntraVENous, Q6H PRN, Niraj Bob MD    famotidine (PEPCID) tablet 20 mg, 20 mg, Oral, BID, Niraj Bob MD, 20 mg at 05/25/22 2056    acidophilus-pectin, citrus tablet 2 Tablet, 2 Tablet, Oral, DAILY, Niraj Bob MD, 2 Tablet at 05/25/22 0802    albuterol-ipratropium (DUO-NEB) 2.5 MG-0.5 MG/3 ML, 3 mL, Nebulization, Q6H PRN, Niraj Bob MD    artificial saliva (MOUTH KOTE) 1 Spray, 1 Spray, Oral, TID, Niraj Bob MD, 1 Donner at 05/25/22 2126    aspirin delayed-release tablet 81 mg, 81 mg, Oral, DAILY, Niraj Bob MD, 81 mg at 05/25/22 0802    brimonidine (ALPHAGAN) 0.2 % ophthalmic solution 1 Drop, 1 Drop, Both Eyes, TID, Niraj Bob MD, 1 Drop at 05/25/22 2125    cholestyramine-aspartame (QUESTRAN LIGHT) packet 4 g, 4 g, Oral, BID WITH MEALS, Niraj Bob MD, 4 g at 05/25/22 0801    [Held by provider] fenofibrate nanocrystallized (TRICOR) tablet 48 mg, 48 mg, Oral, DAILY, Niraj Bob MD, 48 mg at 05/19/22 3715    ferrous sulfate tablet 325 mg, 325 mg, Oral, BID, Niraj Bob MD, 325 mg at 05/25/22 2055    gabapentin (NEURONTIN) capsule 300 mg, 300 mg, Oral, BID, Niraj Bob MD, 300 mg at 05/25/22 2056    insulin glargine (LANTUS) injection 50 Units, 50 Units, SubCUTAneous, DAILY, Niraj Bob MD, 50 Units at 05/25/22 0801    latanoprost (XALATAN) 0.005 % ophthalmic solution 1 Drop, 1 Drop, Right Eye, QPM, PaulinoNiraj MD, 1 Drop at 05/25/22 1730    traMADoL (ULTRAM) tablet 25 mg, 25 mg, Oral, Q12H PRN, Kathleen Rodriguez MD, 25 mg at 05/18/22 8126

## 2022-05-27 NOTE — PROGRESS NOTES
IMPRESSION:   1. Acute hypoxic respiratory failure remains on the ventilator  2. Sacral decubiti ulcer with Acinetobacter and Enterococcus  3. Severe sepsis with septic shock resolved on no pressors  4. Left foot wound  5. Transaminitis continue to improve  6. Shock liver  7. Hypokalemia repleted  8. Symptomatic anemia stable after transfusion  9. Cardiomyopathy  10. Mild pulmonary edema      RECOMMENDATIONS/PLAN:   1. ICU monitoring  1. Ventilator for mechanical life support and prevent respiratory arrest with protective lung strategies  2. Will start weaning we will do sedation vacation today  3. Pending loop colostomy possible surgery which has been postponed  4. Liver enzymes improved   5. Patient underwent on 5/23 left metatarsal amputation and sacral wound debridement  6. Severe sepsis with decubiti ulcer   7. Patient is on Unasyn  8. Chest x-ray shows mild congestive changes with fluid in the minor fissure  9. Anemia hemoglobin improved after transfusion 5/20     [x] High complexity decision making was performed  [x] See my orders for details  HPI   81-year-old lady came in because of leg pain past medical history of hypertension diabetes mellitus peripheral vascular disease CVA she is bedbound she has leg wound and also has sacral decubiti ulcer and she was scheduled for laparoscopic colostomy and sacral wound debridement and amputation of the left tarsometatarsal because of wound infection but her condition got worse her liver enzyme was elevated INR was also elevated she was intubated transferred to ICU was getting vancomycin and Unasyn started on Levophed because of low blood pressure.     PMH:  has a past medical history of Anemia, Chronic kidney disease, Diabetes mellitus ([address] Hemiplegia affecting dominant side, post-stroke ([address] (05/04/2022), HTN (hypertension), Hyperkalemia, Hyperlipidemia, Ill-defined condition, Neuropathy, PAD (peripheral artery disease) ([address] Sciatica, Stroke ([address] and UTI (urinary tract infection). PSH:   has a past surgical history that includes hx other surgical (Bilateral); hx amputation (Right); hx other surgical; hx cervical fusion; hx vascular stent (Bilateral); hx vascular stent (Bilateral); and hx gi. FHX: family history includes Cancer in her mother; Diabetes in her mother; Hypertension in her mother. SHX:  reports that she has never smoked. She has never used smokeless tobacco. She reports previous alcohol use. She reports that she does not use drugs.     ALL: No Known Allergies     MEDS:   [x] Reviewed - As Below   [] Not reviewed    Current Facility-Administered Medications   Medication    aspirin chewable tablet 81 mg    potassium chloride (KLOR-CON) packet for solution 40 mEq    NOREPINephrine (LEVOPHED) 4 mg in 5% dextrose 250 mL infusion    [START ON 5/28/2022] Vancomycin random level to be drawn on 5/28/22 at 0400 am.    0.9% sodium chloride infusion 250 mL    furosemide (LASIX) injection 20 mg    Vancomycin - Reminder to please draw level 5/26 @ 0400    sodium chloride (NS) flush 5-40 mL    sodium chloride (NS) flush 5-40 mL    ondansetron (ZOFRAN ODT) tablet 4 mg    Or    ondansetron (ZOFRAN) injection 4 mg    enoxaparin (LOVENOX) injection 40 mg    dextrose 5% infusion    insulin lispro (HUMALOG) injection    glucose chewable tablet 16 g    glucagon (GLUCAGEN) injection 1 mg    dextrose 10% infusion 0-250 mL    hydrALAZINE (APRESOLINE) tablet 12.5 mg    sacubitriL-valsartan (ENTRESTO) 24-26 mg tablet 1 Tablet    metoprolol succinate (TOPROL-XL) XL tablet 25 mg    ampicillin-sulbactam (UNASYN) 3 g in 0.9% sodium chloride (MBP/ADV) 100 mL MBP    dextrose 10% infusion 0-250 mL    insulin glargine (LANTUS) injection 10 Units    propofol (DIPRIVAN) 10 mg/mL infusion    0.9% sodium chloride infusion 250 mL    sodium hypochlorite (HALF STRENGTH DAKIN'S) 0.25% irrigation (bottle)    Vancomycin Pharmacy Dosing    sodium chloride (NS) flush 5-40 mL    acetaminophen (TYLENOL) tablet 650 mg    Or    acetaminophen (TYLENOL) suppository 650 mg    polyethylene glycol (MIRALAX) packet 17 g    ondansetron (ZOFRAN ODT) tablet 4 mg    Or    ondansetron (ZOFRAN) injection 4 mg    famotidine (PEPCID) tablet 20 mg    acidophilus-pectin, citrus tablet 2 Tablet    albuterol-ipratropium (DUO-NEB) 2.5 MG-0.5 MG/3 ML    artificial saliva (MOUTH KOTE) 1 Spray    brimonidine (ALPHAGAN) 0.2 % ophthalmic solution 1 Drop    cholestyramine-aspartame (QUESTRAN LIGHT) packet 4 g    [Held by provider] fenofibrate nanocrystallized (TRICOR) tablet 48 mg    ferrous sulfate tablet 325 mg    gabapentin (NEURONTIN) capsule 300 mg    insulin glargine (LANTUS) injection 50 Units    latanoprost (XALATAN) 0.005 % ophthalmic solution 1 Drop    traMADoL (ULTRAM) tablet 25 mg      MAR reviewed and pertinent medications noted or modified as needed   Current Facility-Administered Medications   Medication    aspirin chewable tablet 81 mg    potassium chloride (KLOR-CON) packet for solution 40 mEq    NOREPINephrine (LEVOPHED) 4 mg in 5% dextrose 250 mL infusion    [START ON 5/28/2022] Vancomycin random level to be drawn on 5/28/22 at 0400 am.    0.9% sodium chloride infusion 250 mL    furosemide (LASIX) injection 20 mg    Vancomycin - Reminder to please draw level 5/26 @ 0400    sodium chloride (NS) flush 5-40 mL    sodium chloride (NS) flush 5-40 mL    ondansetron (ZOFRAN ODT) tablet 4 mg    Or    ondansetron (ZOFRAN) injection 4 mg    enoxaparin (LOVENOX) injection 40 mg    dextrose 5% infusion    insulin lispro (HUMALOG) injection    glucose chewable tablet 16 g    glucagon (GLUCAGEN) injection 1 mg    dextrose 10% infusion 0-250 mL    hydrALAZINE (APRESOLINE) tablet 12.5 mg    sacubitriL-valsartan (ENTRESTO) 24-26 mg tablet 1 Tablet    metoprolol succinate (TOPROL-XL) XL tablet 25 mg    ampicillin-sulbactam (UNASYN) 3 g in 0.9% sodium chloride (MBP/ADV) 100 mL MBP    dextrose 10% infusion 0-250 mL    insulin glargine (LANTUS) injection 10 Units    propofol (DIPRIVAN) 10 mg/mL infusion    0.9% sodium chloride infusion 250 mL    sodium hypochlorite (HALF STRENGTH DAKIN'S) 0.25% irrigation (bottle)    Vancomycin Pharmacy Dosing    sodium chloride (NS) flush 5-40 mL    acetaminophen (TYLENOL) tablet 650 mg    Or    acetaminophen (TYLENOL) suppository 650 mg    polyethylene glycol (MIRALAX) packet 17 g    ondansetron (ZOFRAN ODT) tablet 4 mg    Or    ondansetron (ZOFRAN) injection 4 mg    famotidine (PEPCID) tablet 20 mg    acidophilus-pectin, citrus tablet 2 Tablet    albuterol-ipratropium (DUO-NEB) 2.5 MG-0.5 MG/3 ML    artificial saliva (MOUTH KOTE) 1 Spray    brimonidine (ALPHAGAN) 0.2 % ophthalmic solution 1 Drop    cholestyramine-aspartame (QUESTRAN LIGHT) packet 4 g    [Held by provider] fenofibrate nanocrystallized (TRICOR) tablet 48 mg    ferrous sulfate tablet 325 mg    gabapentin (NEURONTIN) capsule 300 mg    insulin glargine (LANTUS) injection 50 Units    latanoprost (XALATAN) 0.005 % ophthalmic solution 1 Drop    traMADoL (ULTRAM) tablet 25 mg      PMH:  has a past medical history of Anemia, Chronic kidney disease, Diabetes mellitus ([address] Hemiplegia affecting dominant side, post-stroke ([address] (05/04/2022), HTN (hypertension), Hyperkalemia, Hyperlipidemia, Ill-defined condition, Neuropathy, PAD (peripheral artery disease) ([address] Sciatica, Stroke ([address] and UTI (urinary tract infection). PSH:   has a past surgical history that includes hx other surgical (Bilateral); hx amputation (Right); hx other surgical; hx cervical fusion; hx vascular stent (Bilateral); hx vascular stent (Bilateral); and hx gi. FHX: family history includes Cancer in her mother; Diabetes in her mother; Hypertension in her mother. SHX:  reports that she has never smoked. She has never used smokeless tobacco. She reports previous alcohol use.  She reports that she does not use drugs. ROS:  Unable to obtain intubated on ventilator    Hemodynamics:    CO:    CI:    CVP:    SVR:   PAP Systolic:    PAP Diastolic:    PVR:    JA91:        Ventilator Settings:      Mode Rate TV Press PEEP FiO2 PIP Min. Vent   Assist control,Volume control    500 ml    5 cm H20 30 %  24 cm H2O  7.5 l/min        Vital Signs: Telemetry:    normal sinus rhythm Intake/Output:   Visit Vitals  BP (!) 100/35 (BP 1 Location: Left leg)   Pulse 89   Temp 98.3 °F (36.8 °C)   Resp 14   Ht 5' 6.93\" (1.7 m)   Wt 90 kg (198 lb 6.6 oz)   SpO2 100%   Breastfeeding No   BMI 31.14 kg/m²       Temp (24hrs), Av °F (36.7 °C), Min:96 °F (35.6 °C), Max:98.6 °F (37 °C)        O2 Device: Ventilator O2 Flow Rate (L/min): 1 l/min       Wt Readings from Last 4 Encounters:   22 90 kg (198 lb 6.6 oz)   04/10/22 86.6 kg (191 lb)   22 82.2 kg (181 lb 3.5 oz)   20 84.4 kg (186 lb)          Intake/Output Summary (Last 24 hours) at 2022 0946  Last data filed at 2022 0600  Gross per 24 hour   Intake 2275 ml   Output 3320 ml   Net -1045 ml       Last shift:      No intake/output data recorded. Last 3 shifts:  1901 -  0700  In: 3163.9 [I.V.:938.9]  Out: 5820 [Urine:5820]       Physical Exam:     General: intubated on vent unresponsive on propofol  HEENT: NCAT,  Eyes: anicteric; conjunctiva clear doll's eye reflex present  Neck: no nodes, no cuff leak, trach midline; no accessory MM use. Neck veins obscured by obesity but seems slightly engorged  Chest: no deformity,   Cardiac: R regular; no murmur;   Lungs: distant breath sounds; no wheezes rales in the bases  Abd: soft, NT, hypoactive BS  Ext: Ace edema; no joint swelling;  No clubbing  :clear urine  Neuro: Positive on propofol doll's eye reflex present flaccid extremities  Psych-  unable to assess  Skin: warm, dry, no cyanosis;   Pulses: Brachial and radial pulses intact  Capillary: Normal capillary refill      DATA:    MAR reviewed and pertinent medications noted or modified as needed  MEDS:   Current Facility-Administered Medications   Medication    aspirin chewable tablet 81 mg    potassium chloride (KLOR-CON) packet for solution 40 mEq    NOREPINephrine (LEVOPHED) 4 mg in 5% dextrose 250 mL infusion    [START ON 5/28/2022] Vancomycin random level to be drawn on 5/28/22 at 0400 am.    0.9% sodium chloride infusion 250 mL    furosemide (LASIX) injection 20 mg    Vancomycin - Reminder to please draw level 5/26 @ 0400    sodium chloride (NS) flush 5-40 mL    sodium chloride (NS) flush 5-40 mL    ondansetron (ZOFRAN ODT) tablet 4 mg    Or    ondansetron (ZOFRAN) injection 4 mg    enoxaparin (LOVENOX) injection 40 mg    dextrose 5% infusion    insulin lispro (HUMALOG) injection    glucose chewable tablet 16 g    glucagon (GLUCAGEN) injection 1 mg    dextrose 10% infusion 0-250 mL    hydrALAZINE (APRESOLINE) tablet 12.5 mg    sacubitriL-valsartan (ENTRESTO) 24-26 mg tablet 1 Tablet    metoprolol succinate (TOPROL-XL) XL tablet 25 mg    ampicillin-sulbactam (UNASYN) 3 g in 0.9% sodium chloride (MBP/ADV) 100 mL MBP    dextrose 10% infusion 0-250 mL    insulin glargine (LANTUS) injection 10 Units    propofol (DIPRIVAN) 10 mg/mL infusion    0.9% sodium chloride infusion 250 mL    sodium hypochlorite (HALF STRENGTH DAKIN'S) 0.25% irrigation (bottle)    Vancomycin Pharmacy Dosing    sodium chloride (NS) flush 5-40 mL    acetaminophen (TYLENOL) tablet 650 mg    Or    acetaminophen (TYLENOL) suppository 650 mg    polyethylene glycol (MIRALAX) packet 17 g    ondansetron (ZOFRAN ODT) tablet 4 mg    Or    ondansetron (ZOFRAN) injection 4 mg    famotidine (PEPCID) tablet 20 mg    acidophilus-pectin, citrus tablet 2 Tablet    albuterol-ipratropium (DUO-NEB) 2.5 MG-0.5 MG/3 ML    artificial saliva (MOUTH KOTE) 1 Spray    brimonidine (ALPHAGAN) 0.2 % ophthalmic solution 1 Drop    cholestyramine-aspartame (QUESTRAN LIGHT) packet 4 g    [Held by provider] fenofibrate nanocrystallized (TRICOR) tablet 48 mg    ferrous sulfate tablet 325 mg    gabapentin (NEURONTIN) capsule 300 mg    insulin glargine (LANTUS) injection 50 Units    latanoprost (XALATAN) 0.005 % ophthalmic solution 1 Drop    traMADoL (ULTRAM) tablet 25 mg        Labs:    Recent Labs     22  0410 22  0445 22  1115 22  0745 22  0601   WBC 12.5* 16.2*  --   --  16.8*   HGB 8.7* 9.0*  --   --  10.0*    294  --   --  289   INR  --   --  1.5* 1.6*  --      Recent Labs     22  0445 22  0601   * 147* 145   K 3.2* 3.3* 3.4*   * 119* 119*   CO2 20* 19* 18*   * 104* 156*   BUN 25* 32* 35*   CREA 0.58 0.76 0.72   CA 7.9* 8.5 8.7   MG 1.6  --  2.2   PHOS 3.3  --  3.3   ALB 1.4* 1.6* 1.6*   * 187* 276*     Recent Labs     22  0258 22  0530 22  0550   PH 7.35 7.36 7.33*   PCO2 32* 30* 29*   PO2 102* 94 121*   HCO3 19* 18* 17*   FIO2 30 30 30.0    AC 12  PEEP 5 FiO2 30%   echo ejection fraction 20% mild mitral regurg left atrium 3.5 cm RVSP 35  , 1313, 1361    Lab Results   Component Value Date/Time    Culture result: Heavy  Mixed skin yasmine isolated   2022 06:15 PM    Culture result: Rare Normal respiratory yasmine 2022 02:48 PM    Culture result: Heavy Acinetobacter baumannii complex 05/15/2022 07:15 PM    Culture result: Heavy Diphtheroids 05/15/2022 07:15 PM     Lab Results   Component Value Date/Time    TSH 2.880 2016 10:13 AM        Imagin/22 chest x-ray with ET tube in place hazy accentuated basilar markings with small amount of fluid in the minor fissure  Results from Hospital Encounter encounter on 22    XR CHEST PORT    Narrative  Chest single view. Comparison single view chest May 26, 2022. High position ET tube unchanged.   Unchanged appearance for the lungs; no gross interstitial or alveolar pulmonary  edema. Cardiac and mediastinal structures unchanged. No pneumothorax or pleural  effusion. Results from [place] encounter on 05/14/22    CT ABD PELV WO CONT    Narrative  CT ABD PELV WO CONT    Technique: Noncontrasted CT scan of the abdomen and pelvis was performed  utilizing transaxial images obtained from the dome of the diaphragm to the pubic  symphysis. Coronal and sagittal reconstructions were generated. Dose Reduction:  All CT scans at this facility are performed using dose reduction optimization  techniques as appropriate to a performed exam including the following: Automated  exposure control, adjustments of the mA and/or kV according to patient size, or  use of iterative reconstruction technique. Comparison:  2/25/2022. Findings:  Small-moderate pleural effusions are present with bibasilar  atelectasis. Atherosclerotic calcifications are again evident including coronary  artery calcifications. Gastrostomy tube tip in the gastric antrum. The liver, spleen, pancreas, and  adrenal glands are unremarkable for noncontrasted technique. Stable lobulated  kidneys. Negative for hydronephrosis. Gallbladder is unremarkable. No biliary  duct dilatation apparent. The abdominal aorta is normal in caliber. There is no evidence of bowel obstruction. Small volume ascites has developed. The urinary bladder is decompressed by a Bell catheter. The uterus is  unremarkable. The appendix is normal.    There is mild body wall edema. No suspicious lytic or blastic lesion evident. Impression  Third spacing of fluids with small volume ascites, small-moderate  pleural effusions, and mild body wall edema. Bibasilar atelectasis.       5/20 intubated on ventilator we will continue current vent settings patient is supposed to go for surgery because of transaminitis elevated liver enzyme we will wait as per surgeon for sacral wound debridement and diverting loop colostomy off Levophed, improvement in AST and ALT  5/21 continue with the current vent settings ABG acceptable liver enzyme improving awaiting for the surgery  5/22 maintain ventilator as is in anticipation of surgery.   Renal function improved mild congestion on chest x-ray we will give 1 dose Lasix and potassium  5/23 patient is going for surgery today we will leave ventilator as it is INR is 1.5  5/25 patient remained on ventilator intubated doing well, patient received vitamin K fresh frozen plasma schedule for laparoscopic loop colostomy possible today  5/26 no surgery has been plans will start weaning do sedation vacation  5/27 try to do sedation vacation to wean patient blood pressure dropped still on Levophed we will continue to wean discussed with the family at bedside  Time of care 30 minutes

## 2022-05-27 NOTE — PROGRESS NOTES
Comprehensive Nutrition Assessment    Type and Reason for Visit: Reassess (interim)    Nutrition Recommendations/Plan:   1. Continue NPO  2. Adjust TF via PEG of  Vital 1.2 to new goal of 45mL/hr, continuous        Flush with 125 mL H2O Q4H        Provide 1 pkt prosource 3x/d (3pkt/d; 180kcals, 45g pro)        Provides 1476 kcal (87%), 12g PRO (100%), 1626 mL H2O (96%).       Propofol at 20mcg/kg/min providing 251kcals/d (102%)     4. Please document TF rate, tolerance, BM in I/Os. Malnutrition Assessment:  Malnutrition Status:  Severe malnutrition (05/19/22 1143)    Context:  Chronic illness         Nutrition Assessment:    Admitted for sacral PI, (4/21) Debridment. (5/19) Plan for procedure however delayed d/t hypotension. No pressor started, MAP 65-70mmHg, pt now intubated in ICU. (5/23) Repeat I&D, L TMA--Wound vacs placed. () vascular surgery today. (5/15) TF initiated, intermittently held for procedures, pressors. (5/25) Low-dose pressor initiated, continues today. Pt on Glucerna pta and initially, adjusted to Vital s/p intubation. Will consider re-initiating Glucerna once off pressors and propofol need reduced. Labs: H/H 8.7/27.4, , K+ 3.2, BUN 25, -197, , Alb 1.4. Meds: Probiotic, ampicillin, Questran Light, pepcid, FeSu, lasix, Insulin, Propofol at 20mcg/kg/min. Nutrition Related Findings:    Unable to complete updated NFPE, pt out of room. PEG in place, infusing. Generalized and BL LE non-pitting edema, pitting BL UE edema. Last BM 5/27, loose, noted chronic diarrhea per daughter however improvements inpatient.  Wound Type: Surgical incision,Multiple,Wound vac (Surgical- L foot s/p TMA with wound vac; Stage 4 sacrum s/p I/D with wound vac)      Current Nutrition Intake & Therapies:  Average Meal Intake: NPO  Average Supplement Intake: NPO  ADULT TUBE FEEDING PEG; Peptide Based; Delivery Method: Continuous; Continuous Initial Rate (mL/hr): 45; Continuous Advance Tube Feeding: No; Water Flush Volume (mL): 125; Water Flush Frequency: Q 4 hours; Modulars/Additives: Protein; Specify How . .. DIET NPO  Additional Calorie Sources:  Propofol at 20mcg/kg/min providing 251kcals/d      Anthropometric Measures:  Height: 5' 6.93\" (170 cm)  Ideal Body Weight (IBW): 135 lbs (61 kg)  Admission Body Weight: 175 lb  Current Body Wt:  90 kg (198 lb 6.6 oz) (5/26), 147 % IBW. Bed scale  Current BMI (kg/m2): 31.1        Weight Adjustment:  (EDW 80kg; BMI 27.6)                 BMI Category: Overweight (BMI 25.0-29. 9)    Estimated Daily Nutrient Needs:  Energy Requirements Based On: Formula  Weight Used for Energy Requirements:  (EDW)  Energy (kcal/day): 1692kcals (PSU 2003b +250 for wounds)  Weight Used for Protein Requirements: Other (specify) (EDW)  Protein (g/day): 120-136 (1.5-1.7g/kg)  Method Used for Fluid Requirements: 1 ml/kcal  Fluid (ml/day): 1692ml    Nutrition Diagnosis:   · Severe malnutrition,In context of chronic illness related to catabolic illness as evidenced by weight loss greater than or equal to 5% in 1 month,severe muscle loss      Nutrition Interventions:   Food and/or Nutrient Delivery: Continue NPO,Continue tube feeding  Nutrition Education/Counseling: No recommendations at this time  Coordination of Nutrition Care: Continue to monitor while inpatient  Plan of Care discussed with: RN    Goals:  Previous Goal Met: Progressing toward goal(s)  Goals: Meet at least 75% of estimated needs,Tolerate nutrition support at goal rate,within 7 days  Specify Other Goals: Maintain BGs <180 mg/dL.  Maintain CBW within +/- 0.5 kg    Nutrition Monitoring and Evaluation:   Behavioral-Environmental Outcomes: None identified  Food/Nutrient Intake Outcomes: Enteral nutrition intake/tolerance  Physical Signs/Symptoms Outcomes: Weight,Hemodynamic status,Fluid status or edema,Nutrition focused physical findings    Discharge Planning:    Enteral nutrition    Brenda Baez  Contact: Ext [phone], or via Perfect[employer]

## 2022-05-27 NOTE — PROGRESS NOTES
Remains acute in ICU on vent.  Accepted to ([address]). No other acceptance to any other SNF facility.   Auth won't be started until patient is medically stable.          Remy Paniagua, MSW

## 2022-05-27 NOTE — WOUND CARE
Negative Pressure    NAME:  Oliverio Pablo  YOB: 1945  MEDICAL RECORD NUMBER:  [id number]  DATE:  5/14/2022     Applied Negative Pressure w/instillation of NS to: surgical incision to sacrum    [x] Applied skin barrier prep to leah-wound.  [x] Cut strips of plastic drape to picture frame wound so that leah-wound is     covered with the drape.  [x] If bridging dressing to less prominent site, cover any intact skin that will come in contact with the Negative Pressure Therapy sponge, gauze or channel drain with plastic drape. The sponge should never touch intact skin.  [x] Cut sponge, gauze or channel drain to size which will fit into the wound/ulcer bed without being forced.  [x] Be sure the sponge is large enough to hold the entire round plastic flange which is attached to the tubing. Never allow flange to be larger than the sponge or it will produce suction damaging intact skin.  Total number of individual pieces of foam used within the wound bed: 1 x contact foam (honeycomb); and 1 x cover foam (solid); 1 x black foam strip for bridge.  [x] If bridging the dressing away from the primary site, be sure the bridge leads to a piece of sponge large enough to hold the entire flange without allowing any of the flange to overlap onto intact skin.  [x] Covered sponge, gauze or channel drain with plastic drape.  [x] Cut a hole in this plastic drape directly over the sponge the same size as the plastic drain tubing.  [x] Removed plastic liner from flange and apply it directly over the hole you cut.  [x] Removed the plastic cover from the flange.  [x] Attached the tubing to the wound/ulcer Negative Pressure Therapy and turn it on to be sure a vacuum is created and that there are no leaks.  [x] If air leaks occur, use plastic drape to patch them.  [] Secured Negative Pressure Therapy dressing with ace wrap loosely if located on an extremity.  Maintain tubing outside of ace wrap. Tubing must not exert pressure on intact skin. Applied per  Guidelines  Applied NPWT w/instillation of NS to help debride remaining slough to wound bed. Most of wound bed remains covered with slough and devitalized tissue. However, thin layer of tissue now noted over previously exposed bone. Stoma paste applied to inferior periwound above anus and area covered with Exuderm prior to applying drape. Track pad was bridged to anterior left thigh. Remained at bedside for one complete cycle of NS soak and removal of NS. Film had a good seal when I left the room. The UltraVac will perform a 10 minute soak of NS every 2 hours. Remaining time will be negative pressure therapy. Next dressing change will be for Tuesday 5/31/22. If wound vac is not working properly: 1) Check to see if track pad tubing is clogged. If so, reposition tubing and ensure tubing for spiking NS  bag is not compressed. 2) If film is leaking, find area with leak and reinforce with film. 3) If leak cannot be fixed, remove dressing, apply wet/dry dressing, and inform the [facility].       Electronically signed by Leidy Connolly RN on 5/27/2022 at 3:52 PM

## 2022-05-27 NOTE — PROCEDURES
Interventional Radiology Post Venous Access Device Note    5/27/2022     Procedure(s): right internal jugular venous triple/quad lumen catheter placed with ultrasound and fluoroscopic guidance. Preliminary Findings: Catheter tip located in the right atrium. No pneumothorax. Complications: None    Plan:  Catheter may be used. No chest x-ray necessary.     Full dictated report to follow

## 2022-05-27 NOTE — PROGRESS NOTES
Progress Note    Patient: Stella Rose MRN: [id number]  SSN: [id number]    YOB: 1945  Age: 78 y.o. Sex: female      Admit Date: 5/14/2022    LOS: 13 days     Subjective:   Patient followed for sacral wound infection with repeat culture growing Acinetobacter and Enterococci. Left foot wound also grew Acinetobacter, now status post left TMA. WBC and procal decreasing. She is currently on Vancomycin and Unasyn. Patient remains intubated in the ICU. She is about to go to IR for central line placement. Met with daughters at bedside and provided update. Objective:     Vitals:    05/27/22 0500 05/27/22 0600 05/27/22 0700 05/27/22 0756   BP: (!) 106/49 (!) 100/35     Pulse: 93 92  89   Resp: 14 14  14   Temp:   98.3 °F (36.8 °C)    SpO2: 100% 100%  100%   Weight:       Height:            Intake and Output:  Current Shift: No intake/output data recorded. Last three shifts: 05/25 1901 - 05/27 0700  In: 3163.9 [I.V.:938.9]  Out: 5820 [Urine:5820]    Physical Exam:    Vitals and nursing note reviewed. Constitutional:       General: She is not in acute distress. Appearance: She is ill-appearing. HENT: ET Tube  Eyes: closed   Cardiovascular:      Rate and Rhythm: Normal rate and regular rhythm. Heart sounds: No murmur heard. Pulmonary: diffuse rhonchi     Effort: Pulmonary effort is normal.      Breath sounds: Normal breath sounds. Abdominal:      General: Bowel sounds are normal.      Palpations: Abdomen is soft. Tenderness: There is no abdominal tenderness. Comments: PEG site unremarkable   Genitourinary:     Comments: Indwelling Bell with clear urine             Musculoskeletal:      Cervical back: Neck supple. Right lower leg: No edema. Left lower leg: No edema. Comments: Left foot surgical wound shows intact incision with nylon sutures, no erythema or drainage   Skin:     Findings: No rash.              Comments: Stage 3 sacral wound   Neurological: intubated   Psychiatric:  intubated      Lab/Data Review:     WBC 12,500  ALT/ /16    Procal 0.39 <0.43 <0.45 <0.71 <1.02 <1.72 <2.32 <0.59 <0.57  CRP 15.80 <15.00 <13.60 < 13.90 <17.10 <18.00 <18.90 <17.90    Blood cultures (5/14) Coagulase negative Staphylococci  Sacral wound (5/14) Acinetobacter baumannii sensitive to Unasyn, Cefepime, Ceftazidime and Enterococcus faecium resistant to Ampicillin  Sacral wound (5/23) Mixed skin yasmine FINAL  Left great toe (5/15) Acinetobacter baumannii  Sputum culture (5/19) Rare normal respiratory yasmine FINAL    Assessment:     Active Problems:    Sacral ulcer ([address] (5/14/2022)    1. Sacral wound infection secondary now to Acinetobacter baumannii and Enterococcus faecium, on Vancomycin and Unasyn, status post excisional wound debridement to the bone, Day #12 Vancomycin and Day #7 Unasyn. 2. Sepsis with persistent leukocytosis, elevated procal and CRP  3. Left foot wound, culture growing Acinetobacter baumannii, status post TMA  4. Possible ischemic hepatitiis with transaminitis following hypotensive episode, resolving  5. Positive blood cultures for Coagulase negative Staphylococci, probable contaminant  6. Hepatitis C antibody positive, resolved (negative HCV viral level)    Comment:  WBC and procal decreasing with CRP unchanged. Plan:   1. Continue IV Unasyn and Vancomycin (for E. Faecium)  2. In am, repeat CBC, CRP, procal  3.  Awaiting repeat urinalysis        Signed By: Keerthi Brice MD     May 27, 2022

## 2022-05-27 NOTE — PROGRESS NOTES
1220: Pt transported to Interventional radiology at this time. Accompanied by RN, RT and IR RN. 36.8

## 2022-05-27 NOTE — PROGRESS NOTES
General Daily Progress Note          Patient Name:   Shorty Duong       YOB: 1945       Age:  78 y.o. 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 Cipro    Admitted again yesterday concerning for worsening of sacral ulcer    During last admission patient was seen by infectious disease and also seen by the vascular surgeon patient had debridement of wound during the last admission    Patient seen by the infectious disease yesterday    Sacral wound infection recent cultures growing Pseudomonas resistant to Zosyn and meropenem    5/17    Patient alert awake not in distress  Patient was seen by the vascular surgeon    Vascular surgery planned for laparoscopic loop colostomy placement and sacral debridement then wound vacuum  Also try to close the wound on the left foot with TMA    5/18    Resting in the bed not in any distress  Blood sugars running high    Seen by infectious disease continue vancomycin and start on Unasyn    5/19    And went to the OR intubated because of elevated LFT and elevated INR  Surgery was canceled    On ventilator 40% O2  Propofol drip    5/20    Patient on ventilator with 30% O2  On propofol drip    Hemoglobin dropped to 6.8    Patient's daughter at the bedside    5/20    Patient on ventilator 30% O2  On propofol drip  Getting PEG tube feeding    5/21    Patient still on ventilator 30% O2  On propofol drip  Liver Function improving    5/22  On ventilator with 30% O2 on propofol drip    5/24  Awaiting tissue culture results.    On ventilator with 30% O2 on propofol drip  Left transmetatarsal amputation and Sacral wound excisional wound debridement 13 x 15 cm to the bone completed yesterday. CXR: Hazy mid and lower lung reticular markings.  Dependent small to moderate volume,  right greater than left pleural effusions  Remarkable labs   WBC: 15.2   Hgb: 8.5   Na; 148  CRP: 13.60   Procal: 0.71      5/25    Patient on ventilator with 30% O2  Seen by the vascular surgeon and plan for surgery today    5/26  Patient on ventilator with 30% O2  Hypotensive on Levophed  On propofol drip    Surgery was canceled yesterday because patient unstable hemodynamically not cleared by the anesthesia    5/27    Patient on ventilator with 30% O2  Hypotensive on Levophed  Getting PEG tube feeding    Objective:     Visit Vitals  BP (!) 100/35 (BP 1 Location: Left leg)   Pulse 89   Temp 98.3 °F (36.8 °C)   Resp 14   Ht 5' 6.93\" (1.7 m)   Wt 90 kg (198 lb 6.6 oz)   SpO2 100%   Breastfeeding No   BMI 31.14 kg/m²        Recent Results (from the past 24 hour(s))   GLUCOSE, POC    Collection Time: 05/26/22 11:44 AM   Result Value Ref Range    Glucose (POC) 132 (H) 65 - 117 mg/dL    Performed by [employer], POC    Collection Time: 05/26/22  5:49 PM   Result Value Ref Range    Glucose (POC) 170 (H) 65 - 117 mg/dL    Performed by [employer]    GLUCOSE, POC    Collection Time: 05/26/22  9:12 PM   Result Value Ref Range    Glucose (POC) 172 (H) 65 - 117 mg/dL    Performed by [employer]    GLUCOSE, POC    Collection Time: 05/26/22 11:57 PM   Result Value Ref Range    Glucose (POC) 169 (H) 65 - 117 mg/dL    Performed by [employer]    BLOOD GAS, ARTERIAL    Collection Time: 05/27/22  2:58 AM   Result Value Ref Range    pH 7.35 7.35 - 7.45      PCO2 32 (L) 35 - 45 mmHg    PO2 102 (H) 75 - 100 mmHg    O2 SAT 99 >95 %    BICARBONATE 19 (L) 22 - 26 mmol/L    BASE DEFICIT 7.0 (H) 0 - 2 mmol/L    O2 METHOD VENT      FIO2 30 %    MODE Assist Control/Volume Control      Tidal volume 500      SET RATE 12 EPAP/CPAP/PEEP 5      SITE Right Radial      EVELIN'S TEST Positive     MAGNESIUM    Collection Time: 05/27/22  4:10 AM   Result Value Ref Range    Magnesium 1.6 1.6 - 2.4 mg/dL   PHOSPHORUS    Collection Time: 05/27/22  4:10 AM   Result Value Ref Range    Phosphorus 3.3 2.6 - 4.7 mg/dL   PROCALCITONIN    Collection Time: 05/27/22  4:10 AM   Result Value Ref Range    Procalcitonin 0.39 (H) 0 ng/mL   CBC W/O DIFF    Collection Time: 05/27/22  4:10 AM   Result Value Ref Range    WBC 12.5 (H) 3.6 - 11.0 K/uL    RBC 3.11 (L) 3.80 - 5.20 M/uL    HGB 8.7 (L) 11.5 - 16.0 g/dL    HCT 27.4 (L) 35.0 - 47.0 %    MCV 88.1 80.0 - 99.0 FL    MCH 28.0 26.0 - 34.0 PG    MCHC 31.8 30.0 - 36.5 g/dL    RDW 17.1 (H) 11.5 - 14.5 %    PLATELET 702 884 - 725 K/uL    MPV 10.8 8.9 - 12.9 FL    NRBC 0.0 0.0  WBC    ABSOLUTE NRBC 0.00 0.00 - 0.50 K/uL   METABOLIC PANEL, COMPREHENSIVE    Collection Time: 05/27/22  4:10 AM   Result Value Ref Range    Sodium 148 (H) 136 - 145 mmol/L    Potassium 3.2 (L) 3.5 - 5.1 mmol/L    Chloride 119 (H) 97 - 108 mmol/L    CO2 20 (L) 21 - 32 mmol/L    Anion gap 9 5 - 15 mmol/L    Glucose 175 (H) 65 - 100 mg/dL    BUN 25 (H) 6 - 20 mg/dL    Creatinine 0.58 0.55 - 1.02 mg/dL    BUN/Creatinine ratio 43 (H) 12 - 20      GFR est AA >60 >60 ml/min/1.73m2    GFR est non-AA >60 >60 ml/min/1.73m2    Calcium 7.9 (L) 8.5 - 10.1 mg/dL    Bilirubin, total 0.4 0.2 - 1.0 mg/dL    AST (SGOT) 16 15 - 37 U/L    ALT (SGPT) 134 (H) 12 - 78 U/L    Alk.  phosphatase 87 45 - 117 U/L    Protein, total 5.7 (L) 6.4 - 8.2 g/dL    Albumin 1.4 (L) 3.5 - 5.0 g/dL    Globulin 4.3 (H) 2.0 - 4.0 g/dL    A-G Ratio 0.3 (L) 1.1 - 2.2     C REACTIVE PROTEIN, QT    Collection Time: 05/27/22  4:10 AM   Result Value Ref Range    C-Reactive protein 15.80 (H) 0.00 - 0.60 mg/dL   GLUCOSE, POC    Collection Time: 05/27/22  5:36 AM   Result Value Ref Range    Glucose (POC) 174 (H) 65 - 117 mg/dL    Performed by Roge Ramirez [unfilled]      Review of Systems    Not a good historian e. Physical Exam:      Constitutional: On ventilator  HENT:   Head: Normocephalic and atraumatic. Eyes: Pupils are equal, round, and reactive to light. EOM are normal.   Cardiovascular: Normal rate, regular rhythm and normal heart sounds. Pulmonary/Chest: Breath sounds normal. No wheezes. No rales. Exhibits no tenderness. Abdominal: Soft. Bowel sounds are normal. There is no abdominal tenderness. There is no rebound and no guarding. Musculoskeletal: Normal range of motion. Neurological: On ventilator   skin sacral decubitus ulcer and left foot wound right big toe amputation    XR CHEST PORT   Final Result      XR CHEST PORT   Final Result      XR CHEST PORT   Final Result      CT ABD PELV WO CONT   Final Result   Third spacing of fluids with small volume ascites, small-moderate   pleural effusions, and mild body wall edema. Bibasilar atelectasis. XR CHEST PORT   Final Result   Similar findings compared to single view chest 1 day earlier. XR CHEST PORT   Final Result   Findings/IMPRESSION: Stable appearance of endotracheal tube. Stable mild   enlargement of cardiomediastinal silhouette. Central vascular congestion with   bilateral perihilar and basilar opacities, consistent with edema and/or   pneumonia, right greater than left. Possible right pleural effusion. No   pneumothorax. XR CHEST PORT   Final Result   Bibasilar opacities, possibly increased on the right. XR CHEST PORT   Final Result   No significant change allowing for difference in technique and   positioning. Single portable AP view compared to yesterday. Suboptimal inspiratory film   compromises visualization of the lower lung fields. Monitoring equipment   overlies the body. The tip of the tube is approximately 3 cm above the jennifer. Mild apparent cardiomegaly. Left heart border and left diaphragm obscured.    Hazy airspace opacification both lung bases may be a combination of atelectasis,   pneumonia, or edema. No large effusion. Negative for pneumothorax. XR CHEST PORT   Final Result   No significant change allowing for difference in technique and   positioning. Single portable AP view compared to yesterday. Rotation to the right. Monitoring   equipment overlies the body. Suboptimal inspiratory film compromises   visualization of the lower lung fields. Mild cardiomegaly. The tip of the ET tube is approximately 3 cm above the jennifer. Mild basal interstitial edema. No new consolidation. No pleural effusion or   pneumothorax. XR CHEST PORT   Final Result   1. The endotracheal tube is in satisfactory position. 2.  There has been a partial interval resolution in the pulmonary interstitial   edema pattern. XR CHEST PORT   Final Result   Ill-defined haziness in the mid to lower lung zones suspect for mild   atelectatic changes and/or interstitial fluid or pleural fluid. US ABD LTD   Final Result   1. Question pericholecystic fluid. No identified gallstones or sludge. 2. Right pleural effusion. 3. Increased right renal echotexture for medical renal disease. XR CHEST PORT   Final Result   Intubation. Findings suggesting hydrostatic edema. XR CHEST PORT   Final Result   No evidence of an acute cardiopulmonary process.       XR CHEST PORT    (Results Pending)        Recent Results (from the past 24 hour(s))   GLUCOSE, POC    Collection Time: 05/26/22 11:44 AM   Result Value Ref Range    Glucose (POC) 132 (H) 65 - 117 mg/dL    Performed by  [facility], POC    Collection Time: 05/26/22  5:49 PM   Result Value Ref Range    Glucose (POC) 170 (H) 65 - 117 mg/dL    Performed by Prema MILLER, POC    Collection Time: 05/26/22  9:12 PM   Result Value Ref Range    Glucose (POC) 172 (H) 65 - 117 mg/dL    Performed by Darryl Charles, POC    Collection Time: 05/26/22 11:57 PM   Result Value Ref Range    Glucose (POC) 169 (H) 65 - 117 mg/dL    Performed by Sandie Mejia    BLOOD GAS, ARTERIAL    Collection Time: 05/27/22  2:58 AM   Result Value Ref Range    pH 7.35 7.35 - 7.45      PCO2 32 (L) 35 - 45 mmHg    PO2 102 (H) 75 - 100 mmHg    O2 SAT 99 >95 %    BICARBONATE 19 (L) 22 - 26 mmol/L    BASE DEFICIT 7.0 (H) 0 - 2 mmol/L    O2 METHOD VENT      FIO2 30 %    MODE Assist Control/Volume Control      Tidal volume 500      SET RATE 12      EPAP/CPAP/PEEP 5      SITE Right Radial      EVELIN'S TEST Positive     MAGNESIUM    Collection Time: 05/27/22  4:10 AM   Result Value Ref Range    Magnesium 1.6 1.6 - 2.4 mg/dL   PHOSPHORUS    Collection Time: 05/27/22  4:10 AM   Result Value Ref Range    Phosphorus 3.3 2.6 - 4.7 mg/dL   PROCALCITONIN    Collection Time: 05/27/22  4:10 AM   Result Value Ref Range    Procalcitonin 0.39 (H) 0 ng/mL   CBC W/O DIFF    Collection Time: 05/27/22  4:10 AM   Result Value Ref Range    WBC 12.5 (H) 3.6 - 11.0 K/uL    RBC 3.11 (L) 3.80 - 5.20 M/uL    HGB 8.7 (L) 11.5 - 16.0 g/dL    HCT 27.4 (L) 35.0 - 47.0 %    MCV 88.1 80.0 - 99.0 FL    MCH 28.0 26.0 - 34.0 PG    MCHC 31.8 30.0 - 36.5 g/dL    RDW 17.1 (H) 11.5 - 14.5 %    PLATELET 025 463 - 706 K/uL    MPV 10.8 8.9 - 12.9 FL    NRBC 0.0 0.0  WBC    ABSOLUTE NRBC 0.00 0.00 - 2.30 K/uL   METABOLIC PANEL, COMPREHENSIVE    Collection Time: 05/27/22  4:10 AM   Result Value Ref Range    Sodium 148 (H) 136 - 145 mmol/L    Potassium 3.2 (L) 3.5 - 5.1 mmol/L    Chloride 119 (H) 97 - 108 mmol/L    CO2 20 (L) 21 - 32 mmol/L    Anion gap 9 5 - 15 mmol/L    Glucose 175 (H) 65 - 100 mg/dL    BUN 25 (H) 6 - 20 mg/dL    Creatinine 0.58 0.55 - 1.02 mg/dL    BUN/Creatinine ratio 43 (H) 12 - 20      GFR est AA >60 >60 ml/min/1.73m2    GFR est non-AA >60 >60 ml/min/1.73m2    Calcium 7.9 (L) 8.5 - 10.1 mg/dL    Bilirubin, total 0.4 0.2 - 1.0 mg/dL    AST (SGOT) 16 15 - 37 U/L    ALT (SGPT) 134 (H) 12 - 78 U/L    Alk.  phosphatase 87 45 - 117 U/L    Protein, total 5.7 (L) 6.4 - 8.2 g/dL    Albumin 1.4 (L) 3.5 - 5.0 g/dL    Globulin 4.3 (H) 2.0 - 4.0 g/dL    A-G Ratio 0.3 (L) 1.1 - 2.2     C REACTIVE PROTEIN, QT    Collection Time: 05/27/22  4:10 AM   Result Value Ref Range    C-Reactive protein 15.80 (H) 0.00 - 0.60 mg/dL   GLUCOSE, POC    Collection Time: 05/27/22  5:36 AM   Result Value Ref Range    Glucose (POC) 174 (H) 65 - 117 mg/dL    Performed by Ron Zaldivar        Results     Procedure Component Value Units Date/Time    CULTURE, TISSUE Fairy Ganser STAIN [[id number]] Collected: 05/23/22 1815    Order Status: Completed Specimen: Tissue Updated: 05/26/22 1510     Special Requests: No Special Requests        GRAM STAIN Rare WBCs seen         Few Gram Negative Rods        Culture result: Heavy  Mixed skin yasmine isolated       CULTURE, RESPIRATORY/SPUTUM/BRONCH Fairy Ganser STAIN [[id number]] Collected: 05/19/22 1448    Order Status: Completed Specimen: Sputum Updated: 05/21/22 0813     Special Requests: No Special Requests        GRAM STAIN 1+ WBCs seen         no epithelial cells seen         No organisms seen        Culture result:       Rare Normal respiratory yasmine          CULTURE, WOUND Fairy Ganser STAIN [[id number]]  (Susceptibility) Collected: 05/15/22 1915    Order Status: Completed Specimen: Wound from Great Toe Updated: 05/18/22 0835     Special Requests: No Special Requests        GRAM STAIN       3+ Gram Positive Rods (Coryneform)                  1+ apparent Gram Negative Rods           Culture result:       Heavy Acinetobacter baumannii complex            Heavy Diphtheroids       Susceptibility      Acinetobacter baumannii complex     GEMA     Ampicillin/sulbactam ($) Susceptible     Cefepime ($$) Susceptible     Ceftazidime ($) Susceptible     Ceftriaxone ($) Intermediate     Ciprofloxacin ($) Resistant     Gentamicin ($) Susceptible     Levofloxacin ($) Resistant     Meropenem ($$) Resistant     Piperacillin/Tazobac ($) Resistant     Tobramycin ($) Susceptible     Trimeth/Sulfa Susceptible                  Linear View                   CULTURE, [place] Fitz STAIN [[id number]]  (Susceptibility) Collected: 05/14/22 0679    Order Status: Completed Specimen: Wound from Ulcer Updated: 05/18/22 0841     Special Requests: No Special Requests        GRAM STAIN Few WBCs seen         2+ Gram Negative Rods               Occasional Gram Positive Cocci in pairs            Rare Gram Positive Rods        Culture result: Moderate Acinetobacter baumannii complex                  Moderate Enterococcus faecium                  Light >2 organisms - contaminated specimen. suggest recollection Anaerobic gram negative rods Beta Lactamase Positive          Susceptibility      Acinetobacter baumannii complex     GEMA     Ampicillin/sulbactam ($) Susceptible     Cefepime ($$) Susceptible     Ceftazidime ($) Susceptible     Ceftriaxone ($) Intermediate     Ciprofloxacin ($) Resistant     Gentamicin ($) Susceptible     Levofloxacin ($) Resistant     Meropenem ($$) Resistant     Piperacillin/Tazobac ($) Resistant     Tobramycin ($) Susceptible     Trimeth/Sulfa Susceptible                  Linear View               Susceptibility      Enterococcus faecium     GEMA     Ampicillin ($) Resistant     Daptomycin ($$$$$)  See below  [1]      Linezolid ($$$$$) Susceptible     Vancomycin ($) Susceptible                 [1]  Dose Dependent  (SENSITIVITIES PERFORMED BY E-TEST)          Linear View                   CULTURE, BLOOD, PAIRED [[id number]]  (Abnormal) Collected: 05/14/22 2000    Order Status: Completed Specimen: Blood Updated: 05/17/22 1212     Special Requests: No Special Requests        Culture result:       Staphylococcus species, coagulase negative GROWING IN THE ANAEROBIC BOTTLE. No Site Indicated            (NOTE) PRELIMINARY REPORT OF GRAM POSITIVE COCCI IN CLUSTERS GROWING IN THE ANAEROBIC BOTTLE, CALLED TO AND READ BACK BY CANDI KNOWLES 5/16/22 1118 SCP.            Labs: Recent Labs     05/27/22  0410 05/26/22  0445   WBC 12.5* 16.2*   HGB 8.7* 9.0*   HCT 27.4* 28.4*    294     Recent Labs     05/27/22  0410 05/26/22  0445 05/25/22  0601   * 147* 145   K 3.2* 3.3* 3.4*   * 119* 119*   CO2 20* 19* 18*   BUN 25* 32* 35*   CREA 0.58 0.76 0.72   * 104* 156*   CA 7.9* 8.5 8.7   MG 1.6  --  2.2   PHOS 3.3  --  3.3     Recent Labs     05/27/22 0410 05/26/22  0445 05/25/22  0601   * 187* 276*   AP 87 92 96   TBILI 0.4 0.5 0.5   TP 5.7* 6.0* 7.2   ALB 1.4* 1.6* 1.6*   GLOB 4.3* 4.4* 5.6*     Recent Labs     05/25/22  1115 05/25/22  0745   INR 1.5* 1.6*   PTP 17.8* 18.8*      No results for input(s): FE, TIBC, PSAT, FERR in the last 72 hours. No results found for: FOL, RBCF   Recent Labs     05/27/22  0258 05/26/22  0530   PH 7.35 7.36   PCO2 32* 30*   PO2 102* 94     No results for input(s): CPK, CKNDX, TROIQ in the last 72 hours.     No lab exists for component: CPKMB  Lab Results   Component Value Date/Time    Cholesterol, total 124 03/08/2022 07:40 AM    HDL Cholesterol 30 03/08/2022 07:40 AM    LDL,Direct 79 06/28/2021 12:00 AM    LDL, calculated 44.8 03/08/2022 07:40 AM    Triglyceride 202 (H) 05/16/2022 06:20 AM    CHOL/HDL Ratio 4.1 03/08/2022 07:40 AM     Lab Results   Component Value Date/Time    Glucose (POC) 174 (H) 05/27/2022 05:36 AM    Glucose (POC) 169 (H) 05/26/2022 11:57 PM    Glucose (POC) 172 (H) 05/26/2022 09:12 PM    Glucose (POC) 170 (H) 05/26/2022 05:49 PM    Glucose (POC) 132 (H) 05/26/2022 11:44 AM     Lab Results   Component Value Date/Time    Color Yellow/Straw 05/14/2022 08:08 PM    Appearance Clear 05/14/2022 08:08 PM    Specific gravity 1.014 05/14/2022 08:08 PM    pH (UA) 6.0 05/14/2022 08:08 PM    Protein 30 (A) 05/14/2022 08:08 PM    Glucose 50 (A) 05/14/2022 08:08 PM    Ketone Negative 05/14/2022 08:08 PM    Bilirubin Negative 05/14/2022 08:08 PM    Urobilinogen 0.1 05/14/2022 08:08 PM    Nitrites Negative 05/14/2022 08:08 PM    Leukocyte Esterase Trace (A) 05/14/2022 08:08 PM    Bacteria Negative 05/14/2022 08:08 PM    Bacteria Rare (A) 05/14/2022 08:08 PM    WBC 0-4 05/14/2022 08:08 PM    WBC 4 05/14/2022 08:08 PM    RBC 0-5 05/14/2022 08:08 PM    RBC 1 05/14/2022 08:08 PM         Assessment:   Acute respiratory failure on ventilator 30% O2  Sacral decubitus ulcer postdebridement  Sepsis with leukocytosis elevated procalcitonin and CRP  Left foot wound  Recent removal of left great toe and second toe  CVA with PEG tube placement  History of aspiration pneumonia  History of chronic kidney disease  CAD with ejection fraction about 20 to 25%  Hypertension  Hyperlipidemia  Anemia of chronic disease  Hyperkalemia  Static post transfusion  Uncontrolled diabetes  Hepatic shock/improving  Coagulopathy  Elevated  Troponin  Bacteremia with coagulase-negative Staphylococcus  Procedure:  1. Left transmetatarsal amputation. 2.  Sacral wound excisional wound debridement 13 x 15 cm to the bone. Hypotension on Levophed  Hypokalemia/replace potassium  Plan:     Unasyn 3 g every 8 hours  Aspirin 81 mg daily  Questran 4 g twice a day  Pepcid 20 twice daily  Ferrous sulfate 325 mg twice a day  Questran 4 g twice a day  Pepcid 20 twice daily  Ferrous sulfate 325 mg twice a day  Lantus 50 units subcu daily and 10 units in p.m.   Gabapentin 300 mg twice a day  Hydralazine 12.5 mg 3 times a day  Metoprolol 25 daily  Vitamin K 10 mg daily  Replace potassium  Entresto 1 tablet twice a day  Vancomycin with pharmacy protocol        Repeat  the labs overall prognosis very poor        laparoscopic loop colostomy for fecal diversion canceled    Continue current medications    Discussed with ICU nurse         Current Facility-Administered Medications:     aspirin chewable tablet 81 mg, 81 mg, Per G Tube, DAILY, Israel Soto MD    potassium chloride (KLOR-CON) packet for solution 40 mEq, 40 mEq, Per Luis Beckett, NOW, MD Elias Avalos NOREPINephrine (LEVOPHED) 8 mg in 0.9% NS 250ml infusion, 0.5-16 mcg/min, IntraVENous, TITRATE, Delano Soto MD    [START ON 5/28/2022] Vancomycin random level to be drawn on 5/28/22 at 0400 am., , Other, Yohan Cortez MD    0.9% sodium chloride infusion 250 mL, 250 mL, IntraVENous, PRN, Sherri Bob MD    furosemide (LASIX) injection 20 mg, 20 mg, IntraVENous, Q12H, Sherri Bob MD, 20 mg at 05/27/22 1222    Vancomycin - Reminder to please draw level 5/26 @ 0400, , Other, Rx Dosing/Monitoring, Yoly Boogie,     sodium chloride (NS) flush 5-40 mL, 5-40 mL, IntraVENous, Q8H, Sherri Bob MD, 10 mL at 05/27/22 1213    sodium chloride (NS) flush 5-40 mL, 5-40 mL, IntraVENous, PRN, Sherri Bob MD    ondansetron (ZOFRAN ODT) tablet 4 mg, 4 mg, Oral, Q8H PRN **OR** ondansetron (ZOFRAN) injection 4 mg, 4 mg, IntraVENous, Q6H PRN, Sherri Bob MD    enoxaparin (LOVENOX) injection 40 mg, 40 mg, SubCUTAneous, DAILY, Sherri Bob MD, 40 mg at 05/27/22 0858    dextrose 5% infusion, 30 mL/hr, IntraVENous, CONTINUOUS, Kalpesh Lara MD, Last Rate: 30 mL/hr at 05/25/22 1114, 30 mL/hr at 05/25/22 1114    insulin lispro (HUMALOG) injection, , SubCUTAneous, Q6H, Sherri Bob MD, 1 Units at 05/27/22 0545    glucose chewable tablet 16 g, 4 Tablet, Oral, PRN, Sherri Bob MD    glucagon [facility]) injection 1 mg, 1 mg, IntraMUSCular, PRN, Sherri Bob MD    dextrose 10% infusion 0-250 mL, 0-250 mL, IntraVENous, PRN, Sherri Bob MD    hydrALAZINE (APRESOLINE) tablet 12.5 mg, 12.5 mg, Oral, TID, Sherri Bob MD, 12.5 mg at 05/25/22 0802    sacubitriL-valsartan (ENTRESTO) 24-26 mg tablet 1 Tablet, 1 Tablet, Oral, Q12H, Sherri Bob MD, 1 Tablet at 05/25/22 2050    metoprolol succinate (TOPROL-XL) XL tablet 25 mg, 25 mg, Oral, DAILY, Sherri Bob MD, 25 mg at 05/25/22 0802    ampicillin-sulbactam (UNASYN) 3 g in 0.9% sodium chloride (MBP/ADV) 100 mL MBP, 3 g, IntraVENous, Q8H, Moshe Bob MD, Last Rate: 200 mL/hr at 05/27/22 0522, 3 g at 05/27/22 0522    dextrose 10% infusion 0-250 mL, 0-250 mL, IntraVENous, PRN, Moshe Bob MD, 125 mL at 05/19/22 0537    insulin glargine (LANTUS) injection 10 Units, 10 Units, SubCUTAneous, QHS, Moshe Bob MD, 10 Units at 05/26/22 2200    propofol (DIPRIVAN) 10 mg/mL infusion, 0-50 mcg/kg/min, IntraVENous, TITRATE, Moshe Bob MD, Last Rate: 9.5 mL/hr at 05/27/22 1000, 20 mcg/kg/min at 05/27/22 1000    0.9% sodium chloride infusion 250 mL, 250 mL, IntraVENous, PRN, Moshe Bob MD, Last Rate: 15 mL/hr at 05/22/22 0820, Rate Verify at 05/22/22 0820    sodium hypochlorite (HALF STRENGTH DAKIN'S) 0.25% irrigation (bottle), , Topical, BID, Moshe Bob MD, Given at 05/25/22 0805    Vancomycin Pharmacy Dosing, , Other, Rx Dosing/Monitoring, Moshe Bob MD    sodium chloride (NS) flush 5-40 mL, 5-40 mL, IntraVENous, Q8H, Moshe Bob MD, 10 mL at 05/27/22 7612    acetaminophen (TYLENOL) tablet 650 mg, 650 mg, Oral, Q6H PRN, 650 mg at 05/22/22 0556 **OR** acetaminophen (TYLENOL) suppository 650 mg, 650 mg, Rectal, Q6H PRN, Moshe Bob MD, 650 mg at 05/16/22 2223    polyethylene glycol (MIRALAX) packet 17 g, 17 g, Oral, DAILY PRN, Moshe Bob MD    ondansetron (ZOFRAN ODT) tablet 4 mg, 4 mg, Oral, Q8H PRN **OR** ondansetron (ZOFRAN) injection 4 mg, 4 mg, IntraVENous, Q6H PRN, Moshe Bob MD    famotidine (PEPCID) tablet 20 mg, 20 mg, Oral, BID, Moshe Bob MD, 20 mg at 05/27/22 5332    acidophilus-pectin, citrus tablet 2 Tablet, 2 Tablet, Oral, DAILY, Moshe Bob MD, 2 Tablet at 05/27/22 0856    albuterol-ipratropium (DUO-NEB) 2.5 MG-0.5 MG/3 ML, 3 mL, Nebulization, Q6H PRN, Moshe Bob MD    artificial saliva (MOUTH KOTE) 1 Spray, 1 Spray, Oral, TID, Bartolo Drake MD, 1 Spray at 05/27/22 0857    brimonidine (ALPHAGAN) 0.2 % ophthalmic solution 1 Drop, 1 Drop, Both Eyes, TID, Kathleen Rodriguez MD, 1 Drop at 05/27/22 0858    cholestyramine-aspartame (QUESTRAN LIGHT) packet 4 g, 4 g, Oral, BID WITH MEALS, Niraj Bob MD, 4 g at 05/26/22 1605    [Held by provider] fenofibrate nanocrystallized (TRICOR) tablet 48 mg, 48 mg, Oral, DAILY, Niraj Bob MD, 48 mg at 05/19/22 9150    ferrous sulfate tablet 325 mg, 325 mg, Oral, BID, Niraj Bob MD, 325 mg at 05/27/22 0859    gabapentin (NEURONTIN) capsule 300 mg, 300 mg, Oral, BID, Niraj Bob MD, 300 mg at 05/27/22 0859    insulin glargine (LANTUS) injection 50 Units, 50 Units, SubCUTAneous, DAILY, Niraj Bob MD, 50 Units at 05/27/22 1023    latanoprost (XALATAN) 0.005 % ophthalmic solution 1 Drop, 1 Drop, Right Eye, QPM, Niraj Bob MD, 1 Drop at 05/26/22 1841    traMADoL (ULTRAM) tablet 25 mg, 25 mg, Oral, Q12H PRN, Niraj Bob MD, 25 mg at 05/18/22 5587

## 2022-05-27 NOTE — PROGRESS NOTES
Progress Note    Patient: Waqas Henson MRN: [id number]  SSN: [id number]    YOB: 1945  Age: 78 y.o. Sex: female      Admit Date: 5/14/2022    LOS: 13 days     Subjective:     Patient seen and examined. This is a patient who is followed for new onset cardiomyopathy and elevated troponin following rapid decompensation prior to loop colostomy, sacral wound debridement, and amputation of left transmetatarsal.  Remains intubated/sedated with Propofol. 5/23 s/p Left transmetatarsal amputation and sacral wound excisional wound debridement. Possible plans for laparoscopic loop colostomy for fecal diversion. Intubated and sedated. Condition unchanged. Family at the bedside. Telemetry Review: Sinus rhythm; heart rate 100s. Review of Symptoms: intubated/sedated      Objective:     Vitals:    05/27/22 0600 05/27/22 0700 05/27/22 0756 05/27/22 1113   BP: (!) 100/35 (!) (P) 97/49     Pulse: 92 94 89 90   Resp: 14 13 14 14   Temp:  98.3 °F (36.8 °C)     SpO2: 100% 100% 100% 100%   Weight:       Height:            Intake and Output:  Current Shift: No intake/output data recorded. Last three shifts: 05/25 1901 - 05/27 0700  In: 3163.9 [I.V.:938.9]  Out: 0253 [Urine:5820]    Physical Exam:   General: Chronically ill appearing female lying in bed intubated/sedated, NAD  HEENT: Normocephalic, PERRL, no drainage  Neck: Supple, Trachea midline, No JVD  RESP: Coarse globally with diminished lower lobes. + Symmetrical chest movement. 30% FiO2. Intubated/Ventilated. Cardiovascular: RRR no RG. + murmur.   PVS: No rubor, cyanosis, mild pitting BLE edema, Radial, DP, PT pulses equal bilaterally  ABD: obese, soft, NT, Normoactive BS  Derm: +arterial line,+sacral wound vac  :+giraldo catheter  Neuro: Sedated with propofol, open eyes and followed simple command of blinking  PSYCH: No anxiety or agitation    Lab Results   Component Value Date/Time     (H) 05/27/2022 04:10 AM    K 3.2 (L) 05/27/2022 04:10 AM  (H) 05/27/2022 04:10 AM    CO2 20 (L) 05/27/2022 04:10 AM    AGAP 9 05/27/2022 04:10 AM     (H) 05/27/2022 04:10 AM    BUN 25 (H) 05/27/2022 04:10 AM    CREA 0.58 05/27/2022 04:10 AM    GFRAA >60 05/27/2022 04:10 AM    GFRNA >60 05/27/2022 04:10 AM          Current Facility-Administered Medications:     aspirin chewable tablet 81 mg, 81 mg, Per G Tube, DAILY, Fany Soto MD    NOREPINephrine (LEVOPHED) 8 mg in 0.9% NS 250ml infusion, 0.5-16 mcg/min, IntraVENous, TITRATE, Fany Soto MD    [START ON 5/28/2022] Vancomycin random level to be drawn on 5/28/22 at 0400 am., , Other, ONCE, Rich Ramsey MD    0.9% sodium chloride infusion 250 mL, 250 mL, IntraVENous, PRN, Moshe Bob MD    furosemide (LASIX) injection 20 mg, 20 mg, IntraVENous, Q12H, Moshe Bob MD, 20 mg at 05/27/22 7611    Vancomycin - Reminder to please draw level 5/26 @ 0400, , Other, Rx Dosing/Monitoring, Jackelin Luci, DO    sodium chloride (NS) flush 5-40 mL, 5-40 mL, IntraVENous, Q8H, Moshe Bob MD, 10 mL at 05/27/22 6054    sodium chloride (NS) flush 5-40 mL, 5-40 mL, IntraVENous, PRN, Moshe Bob MD    ondansetron (ZOFRAN ODT) tablet 4 mg, 4 mg, Oral, Q8H PRN **OR** ondansetron (ZOFRAN) injection 4 mg, 4 mg, IntraVENous, Q6H PRN, Moshe Bob MD    enoxaparin (LOVENOX) injection 40 mg, 40 mg, SubCUTAneous, DAILY, Moshe Bob MD, 40 mg at 05/27/22 0858    dextrose 5% infusion, 30 mL/hr, IntraVENous, CONTINUOUS, Kalpesh Lara MD, Last Rate: 30 mL/hr at 05/25/22 1114, 30 mL/hr at 05/25/22 1114    insulin lispro (HUMALOG) injection, , SubCUTAneous, Q6H, Moshe Bob MD, 1 Units at 05/27/22 0545    glucose chewable tablet 16 g, 4 Tablet, Oral, PRN, Moshe Bob MD    glucagon [facility] [place]) injection 1 mg, 1 mg, IntraMUSCular, PRN, Moshe Bob MD    dextrose 10% infusion 0-250 mL, 0-250 mL, IntraVENous, PRN, Moshe Bob MD    hydrALAZINE (APRESOLINE) tablet 12.5 mg, 12.5 mg, Oral, TID, Yonathan Bob MD, 12.5 mg at 05/25/22 0802    sacubitriL-valsartan (ENTRESTO) 24-26 mg tablet 1 Tablet, 1 Tablet, Oral, Q12H, Yonathan Bob MD, 1 Tablet at 05/25/22 2050    metoprolol succinate (TOPROL-XL) XL tablet 25 mg, 25 mg, Oral, DAILY, Yonathan Bob MD, 25 mg at 05/25/22 0802    ampicillin-sulbactam (UNASYN) 3 g in 0.9% sodium chloride (MBP/ADV) 100 mL MBP, 3 g, IntraVENous, Q8H, Yonathan Bob MD, Last Rate: 200 mL/hr at 05/27/22 0522, 3 g at 05/27/22 0522    dextrose 10% infusion 0-250 mL, 0-250 mL, IntraVENous, PRN, Yonathan Bob MD, 125 mL at 05/19/22 0537    insulin glargine (LANTUS) injection 10 Units, 10 Units, SubCUTAneous, QHS, Yonathan Bob MD, 10 Units at 05/26/22 2200    propofol (DIPRIVAN) 10 mg/mL infusion, 0-50 mcg/kg/min, IntraVENous, TITRATE, Yonathan Bob MD, Last Rate: 9.5 mL/hr at 05/27/22 1000, 20 mcg/kg/min at 05/27/22 1000    0.9% sodium chloride infusion 250 mL, 250 mL, IntraVENous, PRN, Yonathan Bob MD, Last Rate: 15 mL/hr at 05/22/22 0820, Rate Verify at 05/22/22 0820    sodium hypochlorite (HALF STRENGTH DAKIN'S) 0.25% irrigation (bottle), , Topical, BID, Yonathan Bob MD, Given at 05/25/22 0805    Vancomycin Pharmacy Dosing, , Other, Rx Dosing/Monitoring, Yonathan Bob MD    sodium chloride (NS) flush 5-40 mL, 5-40 mL, IntraVENous, Q8H, Yonathan Bob MD, 10 mL at 05/27/22 4071    acetaminophen (TYLENOL) tablet 650 mg, 650 mg, Oral, Q6H PRN, 650 mg at 05/22/22 0556 **OR** acetaminophen (TYLENOL) suppository 650 mg, 650 mg, Rectal, Q6H PRN, Yonathan Bob MD, 650 mg at 05/16/22 2223    polyethylene glycol (MIRALAX) packet 17 g, 17 g, Oral, DAILY PRN, Yonathan Bob MD    ondansetron (ZOFRAN ODT) tablet 4 mg, 4 mg, Oral, Q8H PRN **OR** ondansetron (ZOFRAN) injection 4 mg, 4 mg, IntraVENous, Q6H PRN, Yonathan Bob MD    famotidine (PEPCID) tablet 20 mg, 20 mg, Oral, BID, Yonathan Bob MD, 20 mg at 05/27/22 6260   acidophilus-pectin, citrus tablet 2 Tablet, 2 Tablet, Oral, DAILY, Lydia Bob MD, 2 Tablet at 05/27/22 0856    albuterol-ipratropium (DUO-NEB) 2.5 MG-0.5 MG/3 ML, 3 mL, Nebulization, Q6H PRN, Lydia Bob MD    artificial saliva (MOUTH KOTE) 1 Spray, 1 Spray, Oral, TID, Lydia Bob MD, 1 [place] at 05/27/22 0857    brimonidine (ALPHAGAN) 0.2 % ophthalmic solution 1 Drop, 1 Drop, Both Eyes, TID, Lydia Bob MD, 1 Drop at 05/27/22 0858    cholestyramine-aspartame (QUESTRAN LIGHT) packet 4 g, 4 g, Oral, BID WITH MEALS, Lydia Bob MD, 4 g at 05/26/22 1605    [Held by provider] fenofibrate nanocrystallized (TRICOR) tablet 48 mg, 48 mg, Oral, DAILY, Lydia Bob MD, 48 mg at 05/19/22 6315    ferrous sulfate tablet 325 mg, 325 mg, Oral, BID, Lydia Bob MD, 325 mg at 05/27/22 0859    gabapentin (NEURONTIN) capsule 300 mg, 300 mg, Oral, BID, Lydia Bob MD, 300 mg at 05/27/22 0859    insulin glargine (LANTUS) injection 50 Units, 50 Units, SubCUTAneous, DAILY, Lydia Bob MD, 50 Units at 05/27/22 1023    latanoprost (XALATAN) 0.005 % ophthalmic solution 1 Drop, 1 Drop, Right Eye, QPM, Lydia Bob MD, 1 Drop at 05/26/22 1841    traMADoL (ULTRAM) tablet 25 mg, 25 mg, Oral, Q12H PRN, Lydia Bob MD, 25 mg at 05/18/22 0056      Assessment:     Active Problems:    Sacral ulcer ([address] (5/14/2022)        Plan:     Case discussed with Collaborating physician Dr. Saskia Quintero and our recommendations are as follows:     1. New onset cardiomyopathy:    - Echo 5/18/22 showing EF of 20-25% down from 50-55% in 4/2022.    - Continue Entresto,  hydralazine to 12.5mg TID, metoprolol 25 mg daily.  -  Recommend ischemic evaluation once patient is clinically stable. Deferred at this time due to patient's acuity and need for other surgical interventions. - The patient is considered high risk for upcoming procedure.    - Discussed the potential cardiac risks involved with surgical interventions with the patient's family and they have requested to proceed with the surgery. 2. Elevated troponin:    - HS troponin peaked at 890, currently 275. -  Hgb 9.0, stable at this time.    - Continue BB. -  Ischemic evaluation pending post- op clinical course.    - No statin given LFTs elevated likely due to shock. 3.   NSVT:   - No reoccurrence.    - Continue beta blocker.   - Maintain lytes. Thank you for involving us in the care of this patient. Please do not hesitate to call if additional questions arise. If after hours please call [phone]. Signed By: Holly Sandy NP     May 27, 2022            [facility]  [address] [place] [address] [address], [address] [facility]  [phone]

## 2022-05-28 NOTE — PROGRESS NOTES
Progress Note    Patient: Soheila Rodriguez MRN: [id number]  SSN: [id number]    YOB: 1945  Age: 78 y.o. Sex: female      Admit Date: 5/14/2022    LOS: 14 days     Subjective:   Patient followed for sacral wound infection with repeat culture growing Acinetobacter and Enterococci. Left foot wound also grew Acinetobacter, now status post left TMA. WBC, CRP and procal decreasing. She is currently on Vancomycin and Unasyn. Patient remains intubated in the ICU. Still on vasopressor support. Apparently scheduled for diversion colostomy next week. Objective:     Vitals:    05/28/22 0800 05/28/22 0805 05/28/22 1100 05/28/22 1135   BP: (!) 91/46      Pulse: 87 88  76   Resp: 10 12  10   Temp:   98.4 °F (36.9 °C)    SpO2: 99% 100%  100%   Weight:       Height:            Intake and Output:  Current Shift: No intake/output data recorded. Last three shifts: 05/26 1901 - 05/28 0700  In: 2168.1 [I.V.:548.1]  Out: 4850 [Urine:4850]    Physical Exam:    Vitals and nursing note reviewed. Constitutional:       General: She is not in acute distress. Appearance: She is ill-appearing. HENT: ET Tube  Eyes: closed   Cardiovascular:      Rate and Rhythm: Normal rate and regular rhythm. Heart sounds: No murmur heard. Pulmonary: diffuse rhonchi  Abdominal:      General: Bowel sounds are normal.      Palpations: Abdomen is soft. Tenderness: There is no abdominal tenderness. Comments: PEG site unremarkable   Genitourinary:     Comments: Indwelling Bell with clear urine             Musculoskeletal:      Cervical back: Neck supple. Right lower leg: No edema. Left lower leg: No edema. Comments: Left foot surgical wound shows intact incision with nylon sutures, no erythema or drainage   Skin:     Findings: No rash.              Comments: Stage 3 sacral wound with wound vac in place  Neurological: intubated   Psychiatric:  intubated      Lab/Data Review:     WBC 12,400  ALT/ /15    Procal 0.36 < 0.39 <0.43 <0.45 <0.71 <1.02 <1.72 <2.32 <0.59 <0.57  CRP 13.10 <15.80 <15.00 <13.60 < 13.90 <17.10 <18.00 <18.90 <17.90    Blood cultures (5/14) Coagulase negative Staphylococci  Sacral wound (5/14) Acinetobacter baumannii sensitive to Unasyn, Cefepime, Ceftazidime and Enterococcus faecium resistant to Ampicillin  Sacral wound (5/23) Mixed skin yasmine FINAL  Left great toe (5/15) Acinetobacter baumannii  Sputum culture (5/19) Rare normal respiratory yasmine FINAL    Assessment:     Active Problems:    Sacral ulcer ([address] (5/14/2022)    1. Sacral wound infection secondary now to Acinetobacter baumannii and Enterococcus faecium, on Vancomycin and Unasyn, status post excisional wound debridement to the bone, Day #12 Vancomycin and Day #7 Unasyn. 2. Sepsis with persistent leukocytosis, elevated procal and CRP  3. Left foot wound, culture growing Acinetobacter baumannii, status post TMA  4. Possible ischemic hepatitiis with transaminitis following hypotensive episode, resolving  5. Positive blood cultures for Coagulase negative Staphylococci, probable contaminant  6. Hepatitis C antibody positive, resolved (negative HCV viral level)  7. New onset cardiomyopathy    Comment:  WBC and procal decreasing along with CRP       Plan:   1. Continue IV Unasyn and Vancomycin (for E. Faecium)  2.  In am, repeat CBC, CRP, procal           Signed By: Mauricio Royal MD     May 28, 2022

## 2022-05-28 NOTE — PROGRESS NOTES
During my shift, BP cuff on leg and art line did not correlate. Art line with appropriate waveforms so this BP was followed. Bedside report given to Nancy Saucedo RN.

## 2022-05-28 NOTE — PROGRESS NOTES
Progress Note    Patient: Jessenia Hudson MRN: [id number]  SSN: [id number]    YOB: 1945  Age: 78 y.o. Sex: female      Admit Date: 5/14/2022    LOS: 14 days     Subjective:     78years old admitted for severe sepsis sacral decubitus ulcers acute hypoxic respiratory failure on vent support diabetes mellitus type 2 peripheral vascular disease, multiple strokes and bedbound    Objective:     Vitals:    05/28/22 0600 05/28/22 0700 05/28/22 0800 05/28/22 0805   BP: 94/69 (!) 91/44 (!) 91/46    Pulse: 87 84 87 88   Resp: 12 10 10 12   Temp:  97.9 °F (36.6 °C)     SpO2: 100% 99% 99% 100%   Weight:       Height:            Intake and Output:  Current Shift: No intake/output data recorded. Last three shifts: 05/26 1901 - 05/28 0700  In: 2168.1 [I.V.:548.1]  Out: 4850 [Urine:4850]    Physical Exam:   General:  Alert, cooperative, no distress, appears stated age. Eyes:  Conjunctivae/corneas clear. PERRL, EOMs intact. Fundi benign   Ears:  Normal TMs and external ear canals both ears. Nose: Nares normal. Septum midline. Mucosa normal. No drainage or sinus tenderness. Mouth/Throat: Lips, mucosa, and tongue normal. Teeth and gums normal.   Neck: Supple, symmetrical, trachea midline, no adenopathy, thyroid: no enlargment/tenderness/nodules, no carotid bruit and no JVD. Back:   Symmetric, no curvature. ROM normal. No CVA tenderness. Lungs:   Clear to auscultation bilaterally. Heart:  Regular rate and rhythm, S1, S2 normal, no murmur, click, rub or gallop. Abdomen:   Soft, non-tender. Bowel sounds normal. No masses,  No organomegaly. Extremities: Extremities normal, atraumatic, no cyanosis or edema. Pulses: 2+ and symmetric all extremities. Skin: Skin color, texture, turgor normal. No rashes or lesions   Lymph nodes: Cervical, supraclavicular, and axillary nodes normal.   Neurologic: CNII-XII intact. Normal strength, sensation and reflexes throughout. Lab/Data Review:   All lab results for the last 24 hours reviewed. Recent Results (from the past 24 hour(s))   GLUCOSE, POC    Collection Time: 05/27/22 11:22 AM   Result Value Ref Range    Glucose (POC) 197 (H) 65 - 117 mg/dL    Performed by [address] [place] [address], POC    Collection Time: 05/27/22  5:35 PM   Result Value Ref Range    Glucose (POC) 161 (H) 65 - 117 mg/dL    Performed by [address] [place] [address], POC    Collection Time: 05/27/22  9:42 PM   Result Value Ref Range    Glucose (POC) 104 65 - 117 mg/dL    Performed by [address] [place] [address], POC    Collection Time: 05/27/22 11:43 PM   Result Value Ref Range    Glucose (POC) 171 (H) 65 - 117 mg/dL    Performed by Svitlana Britt    BLOOD GAS, ARTERIAL    Collection Time: 05/28/22  3:35 AM   Result Value Ref Range    pH 7.39 7.35 - 7.45      PCO2 33 (L) 35 - 45 mmHg    PO2 124 (H) 75 - 100 mmHg    O2  >95 %    BICARBONATE 20 (L) 22 - 26 mmol/L    BASE DEFICIT 4.9 (H) 0 - 2 mmol/L    O2 METHOD VENT      FIO2 30 %    MODE Assist Control/Volume Control      Tidal volume 500      SET RATE 10      EPAP/CPAP/PEEP 5      SITE Right Radial      EVELIN'S TEST PASS     VANCOMYCIN, RANDOM    Collection Time: 05/28/22  4:00 AM   Result Value Ref Range    Vancomycin, random 13.4 ug/mL   CBC WITH AUTOMATED DIFF    Collection Time: 05/28/22  4:00 AM   Result Value Ref Range    WBC 12.4 (H) 3.6 - 11.0 K/uL    RBC 2.99 (L) 3.80 - 5.20 M/uL    HGB 8.4 (L) 11.5 - 16.0 g/dL    HCT 26.5 (L) 35.0 - 47.0 %    MCV 88.6 80.0 - 99.0 FL    MCH 28.1 26.0 - 34.0 PG    MCHC 31.7 30.0 - 36.5 g/dL    RDW 17.1 (H) 11.5 - 14.5 %    PLATELET 089 254 - 707 K/uL    MPV 10.4 8.9 - 12.9 FL    NRBC 0.0 0.0  WBC    ABSOLUTE NRBC 0.00 0.00 - 0.01 K/uL    NEUTROPHILS 72 32 - 75 %    LYMPHOCYTES 17 12 - 49 %    MONOCYTES 6 5 - 13 %    EOSINOPHILS 3 0 - 7 %    BASOPHILS 1 0 - 1 %    IMMATURE GRANULOCYTES 1 (H) 0 - 0.5 %    ABS. NEUTROPHILS 9.0 (H) 1.8 - 8.0 K/UL    ABS.  LYMPHOCYTES 2.1 0.8 - 3.5 K/UL ABS. MONOCYTES 0.8 0.0 - 1.0 K/UL    ABS. EOSINOPHILS 0.4 0.0 - 0.4 K/UL    ABS. BASOPHILS 0.1 0.0 - 0.1 K/UL    ABS. IMM. GRANS. 0.1 (H) 0.00 - 0.04 K/UL    DF AUTOMATED     C REACTIVE PROTEIN, QT    Collection Time: 05/28/22  4:00 AM   Result Value Ref Range    C-Reactive protein 13.10 (H) 0.00 - 0.60 mg/dL   PROCALCITONIN    Collection Time: 05/28/22  4:00 AM   Result Value Ref Range    Procalcitonin 0.36 (H) 0 ng/mL   METABOLIC PANEL, COMPREHENSIVE    Collection Time: 05/28/22  4:30 AM   Result Value Ref Range    Sodium 145 136 - 145 mmol/L    Potassium 3.5 3.5 - 5.1 mmol/L    Chloride 116 (H) 97 - 108 mmol/L    CO2 22 21 - 32 mmol/L    Anion gap 7 5 - 15 mmol/L    Glucose 155 (H) 65 - 100 mg/dL    BUN 21 (H) 6 - 20 mg/dL    Creatinine 0.53 (L) 0.55 - 1.02 mg/dL    BUN/Creatinine ratio 40 (H) 12 - 20      GFR est AA >60 >60 ml/min/1.73m2    GFR est non-AA >60 >60 ml/min/1.73m2    Calcium 8.1 (L) 8.5 - 10.1 mg/dL    Bilirubin, total 0.4 0.2 - 1.0 mg/dL    AST (SGOT) 15 15 - 37 U/L    ALT (SGPT) 109 (H) 12 - 78 U/L    Alk.  phosphatase 95 45 - 117 U/L    Protein, total 5.9 (L) 6.4 - 8.2 g/dL    Albumin 1.4 (L) 3.5 - 5.0 g/dL    Globulin 4.5 (H) 2.0 - 4.0 g/dL    A-G Ratio 0.3 (L) 1.1 - 2.2     GLUCOSE, POC    Collection Time: 05/28/22  7:13 AM   Result Value Ref Range    Glucose (POC) 145 (H) 65 - 117 mg/dL    Performed by Terry Murdock          Assessment:     Active Problems:    Sacral ulcer ([address] (5/14/2022)    As above    Plan:     Infectious disease notes reviewed continue present treatment    Signed By: Neva Church MD     May 28, 2022

## 2022-05-28 NOTE — PROGRESS NOTES
IMPRESSION:   1. Acute hypoxic respiratory failure remains on the ventilator  2. Sacral decubiti ulcer with Acinetobacter and Enterococcus  3. Severe sepsis with septic shock resolved on no pressors  4. Left foot wound  5. Transaminitis continue to improve  6. Shock liver  7. Hypokalemia repleted  8. Symptomatic anemia stable after transfusion  9. Cardiomyopathy  10. Mild pulmonary edema      RECOMMENDATIONS/PLAN:   1. ICU monitoring  1. Ventilator for mechanical life support and prevent respiratory arrest with protective lung strategies  2. Possible surgery next we will keep on ventilator and also she is hemodynamically unstable on Levophed  3. Pending loop colostomy possible surgery which has been postponed till next week  4. Liver enzymes improved   5. Patient underwent on 5/23 left metatarsal amputation and sacral wound debridement  6. Severe sepsis with decubiti ulcer   7. Patient is on Unasyn  8. Chest x-ray shows mild congestive changes with fluid in the minor fissure  9. Anemia hemoglobin improved after transfusion 5/20     [x] High complexity decision making was performed  [x] See my orders for details  HPI   82-year-old lady came in because of leg pain past medical history of hypertension diabetes mellitus peripheral vascular disease CVA she is bedbound she has leg wound and also has sacral decubiti ulcer and she was scheduled for laparoscopic colostomy and sacral wound debridement and amputation of the left tarsometatarsal because of wound infection but her condition got worse her liver enzyme was elevated INR was also elevated she was intubated transferred to ICU was getting vancomycin and Unasyn started on Levophed because of low blood pressure.     PMH:  has a past medical history of Anemia, Chronic kidney disease, Diabetes mellitus ([address] Hemiplegia affecting dominant side, post-stroke ([address] (05/04/2022), HTN (hypertension), Hyperkalemia, Hyperlipidemia, Ill-defined condition, Neuropathy, PAD (peripheral artery disease) ([facility] [address] Sciatica, Stroke ([facility] [address] and UTI (urinary tract infection). PSH:   has a past surgical history that includes hx other surgical (Bilateral); hx amputation (Right); hx other surgical; hx cervical fusion; hx vascular stent (Bilateral); hx vascular stent (Bilateral); hx gi; and ir insert non tunl cvc over 5 yrs (5/27/2022). FHX: family history includes Cancer in her mother; Diabetes in her mother; Hypertension in her mother. SHX:  reports that she has never smoked. She has never used smokeless tobacco. She reports previous alcohol use. She reports that she does not use drugs.     ALL: No Known Allergies     MEDS:   [x] Reviewed - As Below   [] Not reviewed    Current Facility-Administered Medications   Medication    aspirin chewable tablet 81 mg    NOREPINephrine (LEVOPHED) 8 mg in 0.9% NS 250ml infusion    sodium chloride (NS) flush 5-40 mL    sodium chloride (NS) flush 5-40 mL    0.9% sodium chloride infusion 250 mL    furosemide (LASIX) injection 20 mg    Vancomycin - Reminder to please draw level 5/26 @ 0400    sodium chloride (NS) flush 5-40 mL    sodium chloride (NS) flush 5-40 mL    ondansetron (ZOFRAN ODT) tablet 4 mg    Or    ondansetron (ZOFRAN) injection 4 mg    enoxaparin (LOVENOX) injection 40 mg    dextrose 5% infusion    insulin lispro (HUMALOG) injection    glucose chewable tablet 16 g    glucagon (GLUCAGEN) injection 1 mg    dextrose 10% infusion 0-250 mL    hydrALAZINE (APRESOLINE) tablet 12.5 mg    sacubitriL-valsartan (ENTRESTO) 24-26 mg tablet 1 Tablet    metoprolol succinate (TOPROL-XL) XL tablet 25 mg    ampicillin-sulbactam (UNASYN) 3 g in 0.9% sodium chloride (MBP/ADV) 100 mL MBP    dextrose 10% infusion 0-250 mL    insulin glargine (LANTUS) injection 10 Units    propofol (DIPRIVAN) 10 mg/mL infusion    0.9% sodium chloride infusion 250 mL    sodium hypochlorite (HALF STRENGTH DAKIN'S) 0.25% irrigation (bottle)    Vancomycin Pharmacy Dosing    sodium chloride (NS) flush 5-40 mL    acetaminophen (TYLENOL) tablet 650 mg    Or    acetaminophen (TYLENOL) suppository 650 mg    polyethylene glycol (MIRALAX) packet 17 g    ondansetron (ZOFRAN ODT) tablet 4 mg    Or    ondansetron (ZOFRAN) injection 4 mg    famotidine (PEPCID) tablet 20 mg    acidophilus-pectin, citrus tablet 2 Tablet    albuterol-ipratropium (DUO-NEB) 2.5 MG-0.5 MG/3 ML    artificial saliva (MOUTH KOTE) 1 Spray    brimonidine (ALPHAGAN) 0.2 % ophthalmic solution 1 Drop    cholestyramine-aspartame (QUESTRAN LIGHT) packet 4 g    [Held by provider] fenofibrate nanocrystallized (TRICOR) tablet 48 mg    ferrous sulfate tablet 325 mg    gabapentin (NEURONTIN) capsule 300 mg    insulin glargine (LANTUS) injection 50 Units    latanoprost (XALATAN) 0.005 % ophthalmic solution 1 Drop    traMADoL (ULTRAM) tablet 25 mg      MAR reviewed and pertinent medications noted or modified as needed   Current Facility-Administered Medications   Medication    aspirin chewable tablet 81 mg    NOREPINephrine (LEVOPHED) 8 mg in 0.9% NS 250ml infusion    sodium chloride (NS) flush 5-40 mL    sodium chloride (NS) flush 5-40 mL    0.9% sodium chloride infusion 250 mL    furosemide (LASIX) injection 20 mg    Vancomycin - Reminder to please draw level 5/26 @ 0400    sodium chloride (NS) flush 5-40 mL    sodium chloride (NS) flush 5-40 mL    ondansetron (ZOFRAN ODT) tablet 4 mg    Or    ondansetron (ZOFRAN) injection 4 mg    enoxaparin (LOVENOX) injection 40 mg    dextrose 5% infusion    insulin lispro (HUMALOG) injection    glucose chewable tablet 16 g    glucagon (GLUCAGEN) injection 1 mg    dextrose 10% infusion 0-250 mL    hydrALAZINE (APRESOLINE) tablet 12.5 mg    sacubitriL-valsartan (ENTRESTO) 24-26 mg tablet 1 Tablet    metoprolol succinate (TOPROL-XL) XL tablet 25 mg    ampicillin-sulbactam (UNASYN) 3 g in 0.9% sodium chloride (MBP/ADV) 100 mL MBP    dextrose 10% infusion 0-250 mL    insulin glargine (LANTUS) injection 10 Units    propofol (DIPRIVAN) 10 mg/mL infusion    0.9% sodium chloride infusion 250 mL    sodium hypochlorite (HALF STRENGTH DAKIN'S) 0.25% irrigation (bottle)    Vancomycin Pharmacy Dosing    sodium chloride (NS) flush 5-40 mL    acetaminophen (TYLENOL) tablet 650 mg    Or    acetaminophen (TYLENOL) suppository 650 mg    polyethylene glycol (MIRALAX) packet 17 g    ondansetron (ZOFRAN ODT) tablet 4 mg    Or    ondansetron (ZOFRAN) injection 4 mg    famotidine (PEPCID) tablet 20 mg    acidophilus-pectin, citrus tablet 2 Tablet    albuterol-ipratropium (DUO-NEB) 2.5 MG-0.5 MG/3 ML    artificial saliva (MOUTH KOTE) 1 Spray    brimonidine (ALPHAGAN) 0.2 % ophthalmic solution 1 Drop    cholestyramine-aspartame (QUESTRAN LIGHT) packet 4 g    [Held by provider] fenofibrate nanocrystallized (TRICOR) tablet 48 mg    ferrous sulfate tablet 325 mg    gabapentin (NEURONTIN) capsule 300 mg    insulin glargine (LANTUS) injection 50 Units    latanoprost (XALATAN) 0.005 % ophthalmic solution 1 Drop    traMADoL (ULTRAM) tablet 25 mg      PMH:  has a past medical history of Anemia, Chronic kidney disease, Diabetes mellitus ([address] Hemiplegia affecting dominant side, post-stroke ([address] (05/04/2022), HTN (hypertension), Hyperkalemia, Hyperlipidemia, Ill-defined condition, Neuropathy, PAD (peripheral artery disease) ([address] Sciatica, Stroke ([address] and UTI (urinary tract infection). PSH:   has a past surgical history that includes hx other surgical (Bilateral); hx amputation (Right); hx other surgical; hx cervical fusion; hx vascular stent (Bilateral); hx vascular stent (Bilateral); hx gi; and ir insert non tunl cvc over 5 yrs (5/27/2022). FHX: family history includes Cancer in her mother; Diabetes in her mother; Hypertension in her mother. SHX:  reports that she has never smoked.  She has never used smokeless tobacco. She reports previous alcohol use. She reports that she does not use drugs. ROS:  Unable to obtain intubated on ventilator    Hemodynamics:    CO:    CI:    CVP:    SVR:   PAP Systolic:    PAP Diastolic:    PVR:    FA25:        Ventilator Settings:      Mode Rate TV Press PEEP FiO2 PIP Min. Vent   Assist control,Volume control    500 ml    5 cm H20 21 %  22 cm H2O  5.82 l/min        Vital Signs: Telemetry:    normal sinus rhythm Intake/Output:   Visit Vitals  BP (!) 91/44 Comment: Cuff and art line don't correlate   Pulse 88   Temp 97.9 °F (36.6 °C)   Resp 12   Ht 5' 6.93\" (1.7 m)   Wt 90 kg (198 lb 6.6 oz)   SpO2 100%   Breastfeeding No   BMI 31.14 kg/m²       Temp (24hrs), Av °F (36.7 °C), Min:97.4 °F (36.3 °C), Max:98.7 °F (37.1 °C)        O2 Device: Ventilator O2 Flow Rate (L/min): 1 l/min       Wt Readings from Last 4 Encounters:   22 90 kg (198 lb 6.6 oz)   04/10/22 86.6 kg (191 lb)   22 82.2 kg (181 lb 3.5 oz)   20 84.4 kg (186 lb)          Intake/Output Summary (Last 24 hours) at 2022 0922  Last data filed at 2022 0518  Gross per 24 hour   Intake 1173.05 ml   Output 3100 ml   Net -1926.95 ml       Last shift:      No intake/output data recorded. Last 3 shifts:  190 -  0700  In: 2123.1 [I.V.:548.1]  Out: 4850 [Urine:4850]       Physical Exam:     General: intubated on vent unresponsive on propofol  HEENT: NCAT,  Eyes: anicteric; conjunctiva clear doll's eye reflex present  Neck: no nodes, no cuff leak, trach midline; no accessory MM use. Neck veins obscured by obesity but seems slightly engorged  Chest: no deformity,   Cardiac: R regular; no murmur;   Lungs: distant breath sounds; no wheezes rales in the bases  Abd: soft, NT, hypoactive BS  Ext: Ace edema; no joint swelling;  No clubbing  :clear urine  Neuro: Positive on propofol doll's eye reflex present flaccid extremities  Psych-  unable to assess  Skin: warm, dry, no cyanosis;   Pulses: Brachial and radial pulses intact  Capillary: Normal capillary refill      DATA:    MAR reviewed and pertinent medications noted or modified as needed  MEDS:   Current Facility-Administered Medications   Medication    aspirin chewable tablet 81 mg    NOREPINephrine (LEVOPHED) 8 mg in 0.9% NS 250ml infusion    sodium chloride (NS) flush 5-40 mL    sodium chloride (NS) flush 5-40 mL    0.9% sodium chloride infusion 250 mL    furosemide (LASIX) injection 20 mg    Vancomycin - Reminder to please draw level 5/26 @ 0400    sodium chloride (NS) flush 5-40 mL    sodium chloride (NS) flush 5-40 mL    ondansetron (ZOFRAN ODT) tablet 4 mg    Or    ondansetron (ZOFRAN) injection 4 mg    enoxaparin (LOVENOX) injection 40 mg    dextrose 5% infusion    insulin lispro (HUMALOG) injection    glucose chewable tablet 16 g    glucagon (GLUCAGEN) injection 1 mg    dextrose 10% infusion 0-250 mL    hydrALAZINE (APRESOLINE) tablet 12.5 mg    sacubitriL-valsartan (ENTRESTO) 24-26 mg tablet 1 Tablet    metoprolol succinate (TOPROL-XL) XL tablet 25 mg    ampicillin-sulbactam (UNASYN) 3 g in 0.9% sodium chloride (MBP/ADV) 100 mL MBP    dextrose 10% infusion 0-250 mL    insulin glargine (LANTUS) injection 10 Units    propofol (DIPRIVAN) 10 mg/mL infusion    0.9% sodium chloride infusion 250 mL    sodium hypochlorite (HALF STRENGTH DAKIN'S) 0.25% irrigation (bottle)    Vancomycin Pharmacy Dosing    sodium chloride (NS) flush 5-40 mL    acetaminophen (TYLENOL) tablet 650 mg    Or    acetaminophen (TYLENOL) suppository 650 mg    polyethylene glycol (MIRALAX) packet 17 g    ondansetron (ZOFRAN ODT) tablet 4 mg    Or    ondansetron (ZOFRAN) injection 4 mg    famotidine (PEPCID) tablet 20 mg    acidophilus-pectin, citrus tablet 2 Tablet    albuterol-ipratropium (DUO-NEB) 2.5 MG-0.5 MG/3 ML    artificial saliva (MOUTH KOTE) 1 Spray    brimonidine (ALPHAGAN) 0.2 % ophthalmic solution 1 Drop    cholestyramine-aspartame (QUESTRAN LIGHT) packet 4 g    [Held by provider] fenofibrate nanocrystallized (TRICOR) tablet 48 mg    ferrous sulfate tablet 325 mg    gabapentin (NEURONTIN) capsule 300 mg    insulin glargine (LANTUS) injection 50 Units    latanoprost (XALATAN) 0.005 % ophthalmic solution 1 Drop    traMADoL (ULTRAM) tablet 25 mg        Labs:    Recent Labs     22  0400 22  0410 22  0445 22  1115   WBC 12.4* 12.5* 16.2*  --    HGB 8.4* 8.7* 9.0*  --     302 294  --    INR  --   --   --  1.5*     Recent Labs     22  0430 22  0410 22  0445    148* 147*   K 3.5 3.2* 3.3*   * 119* 119*   CO2 22 20* 19*   * 175* 104*   BUN 21* 25* 32*   CREA 0.53* 0.58 0.76   CA 8.1* 7.9* 8.5   MG  --  1.6  --    PHOS  --  3.3  --    ALB 1.4* 1.4* 1.6*   * 134* 187*     Recent Labs     22  0335 22  0258 22  0530   PH 7.39 7.35 7.36   PCO2 33* 32* 30*   PO2 124* 102* 94   HCO3 20* 19* 18*   FIO2 30 30 30    AC 12  PEEP 5 FiO2 30%   echo ejection fraction 20% mild mitral regurg left atrium 3.5 cm RVSP 35  , 1313, 1361    Lab Results   Component Value Date/Time    Culture result: Heavy  Mixed skin yasmine isolated   2022 06:15 PM    Culture result: Rare Normal respiratory yasmine 2022 02:48 PM    Culture result: Heavy Acinetobacter baumannii complex 05/15/2022 07:15 PM    Culture result: Heavy Diphtheroids 05/15/2022 07:15 PM     Lab Results   Component Value Date/Time    TSH 2.880 2016 10:13 AM        Imagin/22 chest x-ray with ET tube in place hazy accentuated basilar markings with small amount of fluid in the minor fissure  Results from Hospital Encounter encounter on 22    XR CHEST PORT    Narrative  Chest single view. Comparison single view chest May 26, 2022. High position ET tube unchanged. Unchanged appearance for the lungs; no gross interstitial or alveolar pulmonary  edema. Cardiac and mediastinal structures unchanged.  No pneumothorax or pleural  effusion. Results from [place] encounter on 05/14/22    CT ABD PELV WO CONT    Narrative  CT ABD PELV WO CONT    Technique: Noncontrasted CT scan of the abdomen and pelvis was performed  utilizing transaxial images obtained from the dome of the diaphragm to the pubic  symphysis. Coronal and sagittal reconstructions were generated. Dose Reduction:  All CT scans at this facility are performed using dose reduction optimization  techniques as appropriate to a performed exam including the following: Automated  exposure control, adjustments of the mA and/or kV according to patient size, or  use of iterative reconstruction technique. Comparison:  2/25/2022. Findings:  Small-moderate pleural effusions are present with bibasilar  atelectasis. Atherosclerotic calcifications are again evident including coronary  artery calcifications. Gastrostomy tube tip in the gastric antrum. The liver, spleen, pancreas, and  adrenal glands are unremarkable for noncontrasted technique. Stable lobulated  kidneys. Negative for hydronephrosis. Gallbladder is unremarkable. No biliary  duct dilatation apparent. The abdominal aorta is normal in caliber. There is no evidence of bowel obstruction. Small volume ascites has developed. The urinary bladder is decompressed by a Bell catheter. The uterus is  unremarkable. The appendix is normal.    There is mild body wall edema. No suspicious lytic or blastic lesion evident. Impression  Third spacing of fluids with small volume ascites, small-moderate  pleural effusions, and mild body wall edema. Bibasilar atelectasis.       5/20 intubated on ventilator we will continue current vent settings patient is supposed to go for surgery because of transaminitis elevated liver enzyme we will wait as per surgeon for sacral wound debridement and diverting loop colostomy off Levophed, improvement in AST and ALT  5/21 continue with the current vent settings ABG acceptable liver enzyme improving awaiting for the surgery  5/22 maintain ventilator as is in anticipation of surgery.   Renal function improved mild congestion on chest x-ray we will give 1 dose Lasix and potassium  5/23 patient is going for surgery today we will leave ventilator as it is INR is 1.5  5/25 patient remained on ventilator intubated doing well, patient received vitamin K fresh frozen plasma schedule for laparoscopic loop colostomy possible today  5/26 no surgery has been plans will start weaning do sedation vacation  5/27 try to do sedation vacation to wean patient blood pressure dropped still on Levophed we will continue to wean discussed with the family at bedside  5/28 remains on ventilator sedated and also on Levophed possible surgery next week  Time of care 30 minutes

## 2022-05-28 NOTE — PROGRESS NOTES
Vancomycin Dosing Consult  Day #13 of vancomycin therapy  Consult ordered by Dr. Darrel Ruiz / Dr. Harvey Woods for this 78 y.o. female for indication of decubitus ulcer infection, sepsis. Antibiotic regimen: Vancomycin + Unasyn    Temp (24hrs), Av.9 °F (36.6 °C), Min:97.4 °F (36.3 °C), Max:98.1 °F (36.7 °C)    Recent Labs     22  0400 22  0410 22  0445   WBC 12.4* 12.5* 16.2*     Recent Labs     22  0430 22  0410 22  0445   CREA 0.53* 0.58 0.76   BUN 21* 25* 32*     Recent Labs     22  0400 22  0410 22  0445   CRP 13.10* 15.80* 15.80*     Recent Labs     22  0400 22  0410 22  0445   PCT 0.36* 0.39* 0.43*       Estimated Creatinine Clearance: 79.8 mL/min (A) (by C-G formula based on SCr of 0.53 mg/dL (L)). ml/min  Concomitant nephrotoxic drugs: Norepinephrine     Cultures:    Wound: Moderate MDR Pseudomonas aeruginosa susceptible to Zerbaxa, aminoglycosides), moderate mixed skin yasmine, moderate mixed enteric yasmine including yeast, final   Blood: CoNS in the anaerobic bottle, final   Wound, ulcer:  Moderate Acinetobacter baumannii, moderate Enterococcus faecium, light >2 organisms (contraindicated), final  5/15 Wound, great toe: Heavy Acinetobacter baumannii complex (Zosyn resistant, susceptible to Unasyn), heavy Diphtheroids, final   Tissue: pending  MRSA Swab: Not ordered, patient already received first dose of vancomycin    Target range: Trough 10-15 mcg/mL   Last Level: 12.1 mcg/mL     Recent level history:  Date/Time Dose & Interval Measured Level (mcg/mL)    @ 0500 Held 17.6    @ 0400 500 mg X1 @ 1129 14.3    @ 0420 500 mg X1 @ 0957 15.5    held  -    5/25 @ 1056 500 mg  X1  12.1   22 @ 1600 750 mg X1 16.5    @ 1200 750 mg x 1  13.4     Wt Readings from Last 1 Encounters:   22 90 kg (198 lb 6.6 oz)   Ideal body weight: 61.4 kg (135 lb 7.1 oz)  Adjusted ideal body weight: 72.9 kg (160 lb 10.1 oz) Assessment/Plan:   Afebrile , Sepsis with leukocytosis elevated procalcitonin and CRP  CKD,  Scr trending down  Level therapeutic today at 13.4. Will Give 750 mg vancomycin IV dose  today at 1200.   A random level has been scheduled for 5/30/22 at  0400 am.  Pharmacy to continue to monitor level and adjust   Antimicrobial stop date TBD

## 2022-05-28 NOTE — PROGRESS NOTES
CARDIOLOGY CONSULTATION    Subjective: Remains intubated and on pressors. INPATIENT MEDICATIONS:  Home medications reviewed.     Current Facility-Administered Medications:     vancomycin (VANCOCIN) 750 mg in 0.9% sodium chloride 250 mL ([id number]), 750 mg, IntraVENous, ONCE, Norberto Lyle MD    [START ON 5/30/2022] Vancomycin random level 5/30 at 04:00, , Other, [place], Norberto Lyle MD    aspirin chewable tablet 81 mg, 81 mg, Per G Tube, DAILY, Israel Soto MD, 81 mg at 05/28/22 0920    NOREPINephrine (LEVOPHED) 8 mg in 0.9% NS 250ml infusion, 0.5-16 mcg/min, IntraVENous, TITRATE, Israel Soto MD, Last Rate: 0.9 mL/hr at 05/28/22 0954, 0.5 mcg/min at 05/28/22 0954    sodium chloride (NS) flush 5-40 mL, 5-40 mL, IntraVENous, Q8H, Mynor Matthew MD, 10 mL at 05/28/22 0516    sodium chloride (NS) flush 5-40 mL, 5-40 mL, IntraVENous, PRN, Rosy Gould MD    0.9% sodium chloride infusion 250 mL, 250 mL, IntraVENous, PRN, Codi Bob MD    furosemide (LASIX) injection 20 mg, 20 mg, IntraVENous, Q12H, Codi Bob MD, 20 mg at 05/28/22 0920    Vancomycin - Reminder to please draw level 5/26 @ 0400, , Other, Rx Dosing/Monitoring, Mal Locket, DO    sodium chloride (NS) flush 5-40 mL, 5-40 mL, IntraVENous, Q8H, Codi Bob MD, 10 mL at 05/28/22 0517    sodium chloride (NS) flush 5-40 mL, 5-40 mL, IntraVENous, PRN, Codi Bob MD    ondansetron (ZOFRAN ODT) tablet 4 mg, 4 mg, Oral, Q8H PRN **OR** ondansetron (ZOFRAN) injection 4 mg, 4 mg, IntraVENous, Q6H PRN, Codi Bob MD    enoxaparin (LOVENOX) injection 40 mg, 40 mg, SubCUTAneous, DAILY, Codi Bob MD, 40 mg at 05/28/22 0920    dextrose 5% infusion, 30 mL/hr, IntraVENous, CONTINUOUS, Kalpesh Lara MD, Last Rate: 30 mL/hr at 05/25/22 1114, 30 mL/hr at 05/25/22 1114    insulin lispro (HUMALOG) injection, , SubCUTAneous, Q6H, Codi Bob MD, 1 Units at 05/28/22 0148    glucose chewable tablet 16 g, 4 Tablet, Oral, PRN, Mary Bob MD    glucagon [facility]) injection 1 mg, 1 mg, IntraMUSCular, PRN, Mary Bob MD    dextrose 10% infusion 0-250 mL, 0-250 mL, IntraVENous, PRN, Mary Bob MD    hydrALAZINE (APRESOLINE) tablet 12.5 mg, 12.5 mg, Oral, TID, Mary Bob MD, 12.5 mg at 05/25/22 0802    sacubitriL-valsartan (ENTRESTO) 24-26 mg tablet 1 Tablet, 1 Tablet, Oral, Q12H, Mary Bob MD, 1 Tablet at 05/27/22 2130    metoprolol succinate (TOPROL-XL) XL tablet 25 mg, 25 mg, Oral, DAILY, Mary Bob MD, 25 mg at 05/25/22 0802    ampicillin-sulbactam (UNASYN) 3 g in 0.9% sodium chloride (MBP/ADV) 100 mL MBP, 3 g, IntraVENous, Q8H, Mary Bob MD, Last Rate: 200 mL/hr at 05/28/22 0517, 3 g at 05/28/22 0517    dextrose 10% infusion 0-250 mL, 0-250 mL, IntraVENous, PRN, Mary Bob MD, 125 mL at 05/19/22 0537    insulin glargine (LANTUS) injection 10 Units, 10 Units, SubCUTAneous, QHS, Mary Bob MD, 10 Units at 05/27/22 2211    propofol (DIPRIVAN) 10 mg/mL infusion, 0-50 mcg/kg/min, IntraVENous, TITRATE, Mary Bob MD, Last Rate: 11.9 mL/hr at 05/28/22 0518, 25 mcg/kg/min at 05/28/22 0518    0.9% sodium chloride infusion 250 mL, 250 mL, IntraVENous, PRN, Mary Bob MD, Last Rate: 15 mL/hr at 05/22/22 0820, Rate Verify at 05/22/22 0820    sodium hypochlorite (HALF STRENGTH DAKIN'S) 0.25% irrigation (bottle), , Topical, BID, Mary Bob MD, Given at 05/25/22 0805    Vancomycin Pharmacy Dosing, , Other, Rx Dosing/Monitoring, Mary Bob MD    sodium chloride (NS) flush 5-40 mL, 5-40 mL, IntraVENous, Q8H, Mary Bob MD, 10 mL at 05/28/22 4904    acetaminophen (TYLENOL) tablet 650 mg, 650 mg, Oral, Q6H PRN, 650 mg at 05/22/22 0556 **OR** acetaminophen (TYLENOL) suppository 650 mg, 650 mg, Rectal, Q6H PRN, Mary Bob MD, 650 mg at 05/16/22 2223    polyethylene glycol (MIRALAX) packet 17 g, 17 g, Oral, DAILY PRN, Mary Bob, MD    ondansetron (ZOFRAN ODT) tablet 4 mg, 4 mg, Oral, Q8H PRN **OR** ondansetron (ZOFRAN) injection 4 mg, 4 mg, IntraVENous, Q6H PRN, Concetta Bob MD    famotidine (PEPCID) tablet 20 mg, 20 mg, Oral, BID, Concetta Bob MD, 20 mg at 05/28/22 0920    acidophilus-pectin, citrus tablet 2 Tablet, 2 Tablet, Oral, DAILY, Concetta Bob MD, 2 Tablet at 05/28/22 0920    albuterol-ipratropium (DUO-NEB) 2.5 MG-0.5 MG/3 ML, 3 mL, Nebulization, Q6H PRN, Concetta Bob MD    artificial saliva (MOUTH KOTE) 1 Spray, 1 Spray, Oral, TID, Concetta Bob MD, 1 [place] at 05/28/22 0921    brimonidine (ALPHAGAN) 0.2 % ophthalmic solution 1 Drop, 1 Drop, Both Eyes, TID, Concetta Bob MD, 1 Drop at 05/28/22 0921    cholestyramine-aspartame (QUESTRAN LIGHT) packet 4 g, 4 g, Oral, BID WITH MEALS, Concetta Bob MD, 4 g at 05/27/22 1030    [Held by provider] fenofibrate nanocrystallized (TRICOR) tablet 48 mg, 48 mg, Oral, DAILY, Concetta Bob MD, 48 mg at 05/19/22 1991    ferrous sulfate tablet 325 mg, 325 mg, Oral, BID, Concetta Bob MD, 325 mg at 05/28/22 0920    gabapentin (NEURONTIN) capsule 300 mg, 300 mg, Oral, BID, Concetta Bob MD, 300 mg at 05/28/22 0920    insulin glargine (LANTUS) injection 50 Units, 50 Units, SubCUTAneous, DAILY, Concetta Bob MD, 50 Units at 05/28/22 0920    latanoprost (XALATAN) 0.005 % ophthalmic solution 1 Drop, 1 Drop, Right Eye, QPM, Concetta Bob MD, 1 Drop at 05/27/22 2007    traMADoL (ULTRAM) tablet 25 mg, 25 mg, Oral, Q12H PRN, Concetta Bob MD, 25 mg at 05/18/22 0056       REVIEW OF SYSTEMS:  Intubated. Physical Exam:   General: Chronically ill appearing female lying in bed intubated/sedated, NAD  HEENT: Normocephalic, PERRL, no drainage  Neck: Supple, Trachea midline, No JVD  RESP: Coarse globally with diminished lower lobes. + Symmetrical chest movement. 30% FiO2. Intubated/Ventilated. Cardiovascular: RRR no RG. + murmur.   PVS: No rubor, cyanosis, mild pitting BLE edema, Radial, DP, PT pulses equal bilaterally  ABD: obese, soft, NT, Normoactive BS  Derm: +arterial line,+sacral wound vac  :+giraldo catheter  Neuro: Sedated with propofol, open eyes and followed simple command of blinking  PSYCH: No anxiety or agitation    Visit Vitals  BP (!) 91/46   Pulse 76   Temp 98.4 °F (36.9 °C)   Resp 10   Ht 5' 6.93\" (1.7 m)   Wt 90 kg (198 lb 6.6 oz)   SpO2 100%   Breastfeeding No   BMI 31.14 kg/m²         1. New onset cardiomyopathy:    - Echo 5/18/22 showing EF of 20-25% down from 50-55% in 4/2022.    - Hold all HF medications while patient on pressors.   -  Recommend ischemic evaluation once patient is clinically stable. Deferred at this time due to patient's acuity and need for other surgical interventions. - The patient is considered high risk for upcoming procedure. - Discussed the potential cardiac risks involved with surgical interventions with the patient's family and they have requested to proceed with the surgery.      2. Elevated troponin:    - HS troponin peaked at 890, currently 275. -  Hgb 98.4, stable at this time.    - Continue BB. -  Ischemic evaluation pending post- op clinical course.    - No statin given LFTs elevated likely due to shock.       3.   NSVT:   - No reoccurrence.    - Continue beta blocker.   - Maintain lytes.        Thank you for involving us in the care of this patient.       Ari Clemons MD  5/28/2022

## 2022-05-29 NOTE — PROGRESS NOTES
IMPRESSION:   1. Acute hypoxic respiratory failure remains on the ventilator  2. Sacral decubiti ulcer with Acinetobacter and Enterococcus  3. Severe sepsis with septic shock resolved on no pressors  4. Left foot wound  5. Transaminitis continue to improve  6. Shock liver  7. Hypokalemia repleted  8. Symptomatic anemia stable after transfusion  9. Cardiomyopathy  10. Mild pulmonary edema      RECOMMENDATIONS/PLAN:   1. ICU monitoring  1. Ventilator for mechanical life support and prevent respiratory arrest with protective lung strategies will decrease the rate she is on 21% FiO2  2. Possible surgery next we will keep on ventilator and also she is hemodynamically unstable on Levophed  3. Pending loop colostomy possible surgery which has been postponed till next week  4. ET tube is 3.5 cm above the jennifer will advise 1 cm and repeat chest x-ray  5. Liver enzymes improved   6. Patient underwent on 5/23 left metatarsal amputation and sacral wound debridement  7. Severe sepsis with decubiti ulcer   8. Patient is on Unasyn  9. Chest x-ray shows mild congestive changes with fluid in the minor fissure  10. Anemia hemoglobin improved after transfusion 5/20     [x] High complexity decision making was performed  [x] See my orders for details  HPI   51-year-old lady came in because of leg pain past medical history of hypertension diabetes mellitus peripheral vascular disease CVA she is bedbound she has leg wound and also has sacral decubiti ulcer and she was scheduled for laparoscopic colostomy and sacral wound debridement and amputation of the left tarsometatarsal because of wound infection but her condition got worse her liver enzyme was elevated INR was also elevated she was intubated transferred to ICU was getting vancomycin and Unasyn started on Levophed because of low blood pressure.     PMH:  has a past medical history of Anemia, Chronic kidney disease, Diabetes mellitus ([facility] [address] Hemiplegia affecting dominant side, post-stroke [facility]) (05/04/2022), HTN (hypertension), Hyperkalemia, Hyperlipidemia, Ill-defined condition, Neuropathy, PAD (peripheral artery disease) ([facility] [address] Sciatica, Stroke ([facility] [address] and UTI (urinary tract infection). PSH:   has a past surgical history that includes hx other surgical (Bilateral); hx amputation (Right); hx other surgical; hx cervical fusion; hx vascular stent (Bilateral); hx vascular stent (Bilateral); hx gi; and ir insert non tunl cvc over 5 yrs (5/27/2022). FHX: family history includes Cancer in her mother; Diabetes in her mother; Hypertension in her mother. SHX:  reports that she has never smoked. She has never used smokeless tobacco. She reports previous alcohol use. She reports that she does not use drugs.     ALL: No Known Allergies     MEDS:   [x] Reviewed - As Below   [] Not reviewed    Current Facility-Administered Medications   Medication    [START ON 5/30/2022] Vancomycin random level 5/30 at 04:00    aspirin chewable tablet 81 mg    NOREPINephrine (LEVOPHED) 8 mg in 0.9% NS 250ml infusion    sodium chloride (NS) flush 5-40 mL    sodium chloride (NS) flush 5-40 mL    0.9% sodium chloride infusion 250 mL    furosemide (LASIX) injection 20 mg    Vancomycin - Reminder to please draw level 5/26 @ 0400    sodium chloride (NS) flush 5-40 mL    sodium chloride (NS) flush 5-40 mL    ondansetron (ZOFRAN ODT) tablet 4 mg    Or    ondansetron (ZOFRAN) injection 4 mg    enoxaparin (LOVENOX) injection 40 mg    dextrose 5% infusion    insulin lispro (HUMALOG) injection    glucose chewable tablet 16 g    glucagon (GLUCAGEN) injection 1 mg    dextrose 10% infusion 0-250 mL    hydrALAZINE (APRESOLINE) tablet 12.5 mg    sacubitriL-valsartan (ENTRESTO) 24-26 mg tablet 1 Tablet    metoprolol succinate (TOPROL-XL) XL tablet 25 mg    ampicillin-sulbactam (UNASYN) 3 g in 0.9% sodium chloride (MBP/ADV) 100 mL MBP    dextrose 10% infusion 0-250 mL    insulin glargine (LANTUS) injection 10 Units    propofol (DIPRIVAN) 10 mg/mL infusion    0.9% sodium chloride infusion 250 mL    sodium hypochlorite (HALF STRENGTH DAKIN'S) 0.25% irrigation (bottle)    Vancomycin Pharmacy Dosing    sodium chloride (NS) flush 5-40 mL    acetaminophen (TYLENOL) tablet 650 mg    Or    acetaminophen (TYLENOL) suppository 650 mg    polyethylene glycol (MIRALAX) packet 17 g    ondansetron (ZOFRAN ODT) tablet 4 mg    Or    ondansetron (ZOFRAN) injection 4 mg    famotidine (PEPCID) tablet 20 mg    acidophilus-pectin, citrus tablet 2 Tablet    albuterol-ipratropium (DUO-NEB) 2.5 MG-0.5 MG/3 ML    artificial saliva (MOUTH KOTE) 1 Spray    brimonidine (ALPHAGAN) 0.2 % ophthalmic solution 1 Drop    cholestyramine-aspartame (QUESTRAN LIGHT) packet 4 g    [Held by provider] fenofibrate nanocrystallized (TRICOR) tablet 48 mg    ferrous sulfate tablet 325 mg    gabapentin (NEURONTIN) capsule 300 mg    insulin glargine (LANTUS) injection 50 Units    latanoprost (XALATAN) 0.005 % ophthalmic solution 1 Drop    traMADoL (ULTRAM) tablet 25 mg      MAR reviewed and pertinent medications noted or modified as needed   Current Facility-Administered Medications   Medication    [START ON 5/30/2022] Vancomycin random level 5/30 at 04:00    aspirin chewable tablet 81 mg    NOREPINephrine (LEVOPHED) 8 mg in 0.9% NS 250ml infusion    sodium chloride (NS) flush 5-40 mL    sodium chloride (NS) flush 5-40 mL    0.9% sodium chloride infusion 250 mL    furosemide (LASIX) injection 20 mg    Vancomycin - Reminder to please draw level 5/26 @ 0400    sodium chloride (NS) flush 5-40 mL    sodium chloride (NS) flush 5-40 mL    ondansetron (ZOFRAN ODT) tablet 4 mg    Or    ondansetron (ZOFRAN) injection 4 mg    enoxaparin (LOVENOX) injection 40 mg    dextrose 5% infusion    insulin lispro (HUMALOG) injection    glucose chewable tablet 16 g    glucagon (GLUCAGEN) injection 1 mg    dextrose 10% infusion 0-250 mL  hydrALAZINE (APRESOLINE) tablet 12.5 mg    sacubitriL-valsartan (ENTRESTO) 24-26 mg tablet 1 Tablet    metoprolol succinate (TOPROL-XL) XL tablet 25 mg    ampicillin-sulbactam (UNASYN) 3 g in 0.9% sodium chloride (MBP/ADV) 100 mL MBP    dextrose 10% infusion 0-250 mL    insulin glargine (LANTUS) injection 10 Units    propofol (DIPRIVAN) 10 mg/mL infusion    0.9% sodium chloride infusion 250 mL    sodium hypochlorite (HALF STRENGTH DAKIN'S) 0.25% irrigation (bottle)    Vancomycin Pharmacy Dosing    sodium chloride (NS) flush 5-40 mL    acetaminophen (TYLENOL) tablet 650 mg    Or    acetaminophen (TYLENOL) suppository 650 mg    polyethylene glycol (MIRALAX) packet 17 g    ondansetron (ZOFRAN ODT) tablet 4 mg    Or    ondansetron (ZOFRAN) injection 4 mg    famotidine (PEPCID) tablet 20 mg    acidophilus-pectin, citrus tablet 2 Tablet    albuterol-ipratropium (DUO-NEB) 2.5 MG-0.5 MG/3 ML    artificial saliva (MOUTH KOTE) 1 Spray    brimonidine (ALPHAGAN) 0.2 % ophthalmic solution 1 Drop    cholestyramine-aspartame (QUESTRAN LIGHT) packet 4 g    [Held by provider] fenofibrate nanocrystallized (TRICOR) tablet 48 mg    ferrous sulfate tablet 325 mg    gabapentin (NEURONTIN) capsule 300 mg    insulin glargine (LANTUS) injection 50 Units    latanoprost (XALATAN) 0.005 % ophthalmic solution 1 Drop    traMADoL (ULTRAM) tablet 25 mg      PMH:  has a past medical history of Anemia, Chronic kidney disease, Diabetes mellitus ([address] Hemiplegia affecting dominant side, post-stroke ([address] (05/04/2022), HTN (hypertension), Hyperkalemia, Hyperlipidemia, Ill-defined condition, Neuropathy, PAD (peripheral artery disease) ([address] Sciatica, Stroke ([address] and UTI (urinary tract infection).   PSH:   has a past surgical history that includes hx other surgical (Bilateral); hx amputation (Right); hx other surgical; hx cervical fusion; hx vascular stent (Bilateral); hx vascular stent (Bilateral); hx gi; and ir insert non tunl cvc over 5 yrs (2022). FHX: family history includes Cancer in her mother; Diabetes in her mother; Hypertension in her mother. SHX:  reports that she has never smoked. She has never used smokeless tobacco. She reports previous alcohol use. She reports that she does not use drugs. ROS:  Unable to obtain intubated on ventilator    Hemodynamics:    CO:    CI:    CVP:    SVR:   PAP Systolic:    PAP Diastolic:    PVR:    AX40:        Ventilator Settings:      Mode Rate TV Press PEEP FiO2 PIP Min. Vent   Assist control,Volume control    500 ml    5 cm H20 21 %  29 cm H2O  5.69 l/min        Vital Signs: Telemetry:    normal sinus rhythm Intake/Output:   Visit Vitals  BP (!) 140/61 (BP 1 Location: Left upper arm, BP Patient Position: At rest)   Pulse 82   Temp 97 °F (36.1 °C)   Resp 11   Ht 5' 6.93\" (1.7 m)   Wt 90 kg (198 lb 6.6 oz)   SpO2 100%   Breastfeeding No   BMI 31.14 kg/m²       Temp (24hrs), Av.3 °F (36.8 °C), Min:97 °F (36.1 °C), Max:98.8 °F (37.1 °C)        O2 Device: Ventilator O2 Flow Rate (L/min): 1 l/min       Wt Readings from Last 4 Encounters:   22 90 kg (198 lb 6.6 oz)   04/10/22 86.6 kg (191 lb)   22 82.2 kg (181 lb 3.5 oz)   20 84.4 kg (186 lb)          Intake/Output Summary (Last 24 hours) at 2022 0806  Last data filed at 2022 0600  Gross per 24 hour   Intake 2790.12 ml   Output 2300 ml   Net 490.12 ml       Last shift:      No intake/output data recorded. Last 3 shifts:  1901 -  0700  In: 3185.1 [I.V.:1020.1]  Out: 4300 [Urine:4250; Drains:50]       Physical Exam:     General: intubated on vent unresponsive on propofol  HEENT: NCAT,  Eyes: anicteric; conjunctiva clear doll's eye reflex present  Neck: no nodes, no cuff leak, trach midline; no accessory MM use.   Neck veins obscured by obesity but seems slightly engorged  Chest: no deformity,   Cardiac: R regular; no murmur;   Lungs: distant breath sounds; no wheezes rales in the bases  Abd: soft, NT, hypoactive BS  Ext: Ace edema; no joint swelling;  No clubbing  :clear urine  Neuro: Positive on propofol doll's eye reflex present flaccid extremities  Psych-  unable to assess  Skin: warm, dry, no cyanosis;   Pulses: Brachial and radial pulses intact  Capillary: Normal capillary refill      DATA:    MAR reviewed and pertinent medications noted or modified as needed  MEDS:   Current Facility-Administered Medications   Medication    [START ON 5/30/2022] Vancomycin random level 5/30 at 04:00    aspirin chewable tablet 81 mg    NOREPINephrine (LEVOPHED) 8 mg in 0.9% NS 250ml infusion    sodium chloride (NS) flush 5-40 mL    sodium chloride (NS) flush 5-40 mL    0.9% sodium chloride infusion 250 mL    furosemide (LASIX) injection 20 mg    Vancomycin - Reminder to please draw level 5/26 @ 0400    sodium chloride (NS) flush 5-40 mL    sodium chloride (NS) flush 5-40 mL    ondansetron (ZOFRAN ODT) tablet 4 mg    Or    ondansetron (ZOFRAN) injection 4 mg    enoxaparin (LOVENOX) injection 40 mg    dextrose 5% infusion    insulin lispro (HUMALOG) injection    glucose chewable tablet 16 g    glucagon (GLUCAGEN) injection 1 mg    dextrose 10% infusion 0-250 mL    hydrALAZINE (APRESOLINE) tablet 12.5 mg    sacubitriL-valsartan (ENTRESTO) 24-26 mg tablet 1 Tablet    metoprolol succinate (TOPROL-XL) XL tablet 25 mg    ampicillin-sulbactam (UNASYN) 3 g in 0.9% sodium chloride (MBP/ADV) 100 mL MBP    dextrose 10% infusion 0-250 mL    insulin glargine (LANTUS) injection 10 Units    propofol (DIPRIVAN) 10 mg/mL infusion    0.9% sodium chloride infusion 250 mL    sodium hypochlorite (HALF STRENGTH DAKIN'S) 0.25% irrigation (bottle)    Vancomycin Pharmacy Dosing    sodium chloride (NS) flush 5-40 mL    acetaminophen (TYLENOL) tablet 650 mg    Or    acetaminophen (TYLENOL) suppository 650 mg    polyethylene glycol (MIRALAX) packet 17 g    ondansetron (ZOFRAN ODT) tablet 4 mg    Or    ondansetron (ZOFRAN) injection 4 mg    famotidine (PEPCID) tablet 20 mg    acidophilus-pectin, citrus tablet 2 Tablet    albuterol-ipratropium (DUO-NEB) 2.5 MG-0.5 MG/3 ML    artificial saliva (MOUTH KOTE) 1 Spray    brimonidine (ALPHAGAN) 0.2 % ophthalmic solution 1 Drop    cholestyramine-aspartame (QUESTRAN LIGHT) packet 4 g    [Held by provider] fenofibrate nanocrystallized (TRICOR) tablet 48 mg    ferrous sulfate tablet 325 mg    gabapentin (NEURONTIN) capsule 300 mg    insulin glargine (LANTUS) injection 50 Units    latanoprost (XALATAN) 0.005 % ophthalmic solution 1 Drop    traMADoL (ULTRAM) tablet 25 mg        Labs:    Recent Labs     22  0340 22  0400 22  0410   WBC 13.5* 12.4* 12.5*   HGB 8.3* 8.4* 8.7*    339 302     Recent Labs     22  0340 22  0430 22  0410    145 148*   K 3.7 3.5 3.2*   * 116* 119*   CO2 22 22 20*   * 155* 175*   BUN 22* 21* 25*   CREA 0.68 0.53* 0.58   CA 8.5 8.1* 7.9*   MG  --   --  1.6   PHOS  --   --  3.3   ALB 1.5* 1.4* 1.4*   ALT 90* 109* 134*     Recent Labs     22  0330 22  0335 22  0258   PH 7.40 7.39 7.35   PCO2 29* 33* 32*   PO2 88 124* 102*   HCO3 20* 20* 19*   FIO2 21 30 30    AC 12  PEEP 5 FiO2 30%   echo ejection fraction 20% mild mitral regurg left atrium 3.5 cm RVSP 35  , 1313, 1361    Lab Results   Component Value Date/Time    Culture result: Heavy  Mixed skin yasmine isolated   2022 06:15 PM    Culture result: Rare Normal respiratory yasmine 2022 02:48 PM    Culture result: Heavy Acinetobacter baumannii complex 05/15/2022 07:15 PM    Culture result: Heavy Diphtheroids 05/15/2022 07:15 PM     Lab Results   Component Value Date/Time    TSH 2.880 2016 10:13 AM        Imagin/22 chest x-ray with ET tube in place hazy accentuated basilar markings with small amount of fluid in the minor fissure  Results from Hospital Encounter encounter on 05/14/22    XR CHEST PORT    Narrative  Portable chest, 0238 hours. Comparison with 5/27/2022. ET tube tip 3.5 cm above  the jennifer. Right central line placed with tip overlying the SVC just below the  level of the right hilum. No postprocedural pneumothorax. Some structures are  partially obscured by overlying monitoring electrodes. Stable mild enlargement  of cardiopericardial silhouette. Central vascular congestion. No definite focal  airspace consolidation. Impression  Cardiomegaly with vascular congestion. Stable appearance of ET tube. Right central line placed, without radiographic evidence of complication. Results from [place] encounter on 05/14/22    CT ABD PELV WO CONT    Narrative  CT ABD PELV WO CONT    Technique: Noncontrasted CT scan of the abdomen and pelvis was performed  utilizing transaxial images obtained from the dome of the diaphragm to the pubic  symphysis. Coronal and sagittal reconstructions were generated. Dose Reduction:  All CT scans at this facility are performed using dose reduction optimization  techniques as appropriate to a performed exam including the following: Automated  exposure control, adjustments of the mA and/or kV according to patient size, or  use of iterative reconstruction technique. Comparison:  2/25/2022. Findings:  Small-moderate pleural effusions are present with bibasilar  atelectasis. Atherosclerotic calcifications are again evident including coronary  artery calcifications. Gastrostomy tube tip in the gastric antrum. The liver, spleen, pancreas, and  adrenal glands are unremarkable for noncontrasted technique. Stable lobulated  kidneys. Negative for hydronephrosis. Gallbladder is unremarkable. No biliary  duct dilatation apparent. The abdominal aorta is normal in caliber. There is no evidence of bowel obstruction. Small volume ascites has developed. The urinary bladder is decompressed by a Bell catheter.  The uterus is  unremarkable. The appendix is normal.    There is mild body wall edema. No suspicious lytic or blastic lesion evident. Impression  Third spacing of fluids with small volume ascites, small-moderate  pleural effusions, and mild body wall edema. Bibasilar atelectasis. 5/20 intubated on ventilator we will continue current vent settings patient is supposed to go for surgery because of transaminitis elevated liver enzyme we will wait as per surgeon for sacral wound debridement and diverting loop colostomy off Levophed, improvement in AST and ALT  5/21 continue with the current vent settings ABG acceptable liver enzyme improving awaiting for the surgery  5/22 maintain ventilator as is in anticipation of surgery.   Renal function improved mild congestion on chest x-ray we will give 1 dose Lasix and potassium  5/23 patient is going for surgery today we will leave ventilator as it is INR is 1.5  5/25 patient remained on ventilator intubated doing well, patient received vitamin K fresh frozen plasma schedule for laparoscopic loop colostomy possible today  5/26 no surgery has been plans will start weaning do sedation vacation  5/27 try to do sedation vacation to wean patient blood pressure dropped still on Levophed we will continue to wean discussed with the family at bedside  5/28 remains on ventilator sedated and also on Levophed possible surgery next week  5/29 on ventilator ET tube advisement 1 to 1.5 cm we will repeat chest x-ray ABG acceptable  Time of care 30 minutes

## 2022-05-29 NOTE — PROGRESS NOTES
Progress Note    Patient: Britta Aquino MRN: [id number]  SSN: [id number]    YOB: 1945  Age: 78 y.o. Sex: female      Admit Date: 5/14/2022    LOS: 15 days     Subjective:   Patient followed for sacral wound infection with repeat culture growing Acinetobacter and Enterococci. Left foot wound also grew Acinetobacter, now status post left TMA. WBC increased today but CRP and procal decreasing. She is currently on Vancomycin and Unasyn. Patient remains intubated in the ICU. Still on vasopressor support. Apparently scheduled for diversion colostomy next week. Objective:     Vitals:    05/29/22 0900 05/29/22 1000 05/29/22 1100 05/29/22 1136   BP: (!) 142/62 (!) 160/66 (!) 103/42    Pulse: 85 86 81 82   Resp: 13 12 18 10   Temp:   97.7 °F (36.5 °C)    SpO2: 100% 100% 100% 100%   Weight:       Height:            Intake and Output:  Current Shift: 05/29 0701 - 05/29 1900  In: -   Out: 800 [Urine:800]  Last three shifts: 05/27 1901 - 05/29 0700  In: 3355.1 [I.V.:1020.1]  Out: 4300 [Urine:4250; Drains:50]    Physical Exam:    Vitals and nursing note reviewed. Constitutional:       General: She is not in acute distress. Appearance: She is ill-appearing. HENT: ET Tube  Eyes: closed   Cardiovascular:      Rate and Rhythm: Normal rate and regular rhythm. Heart sounds: No murmur heard. Pulmonary: diffuse rhonchi  Abdominal:      General: Bowel sounds are normal.      Palpations: Abdomen is soft. Tenderness: There is no abdominal tenderness. Comments: PEG site unremarkable   Genitourinary:     Comments: Indwelling Bell with clear urine             Musculoskeletal:      Cervical back: Neck supple. Right lower leg: No edema. Left lower leg: No edema. Comments: Left foot surgical wound shows intact incision with nylon sutures, no erythema or drainage   Skin:     Findings: No rash.              Comments: Stage 3 sacral wound with wound vac in place  Neurological: intubated   Psychiatric:  intubated      Lab/Data Review:     WBC 13,500  ALT/ /18    Procal 0.31 <0.36 < 0.39 <0.43 <0.45 <0.71 <1.02 <1.72 <2.32 <0.59 <0.57  CRP 11.00 <13.10 <15.80 <15.00 <13.60 < 13.90 <17.10 <18.00 <18.90 <17.90    Blood cultures (5/14) Coagulase negative Staphylococci  Sacral wound (5/14) Acinetobacter baumannii sensitive to Unasyn, Cefepime, Ceftazidime and Enterococcus faecium resistant to Ampicillin  Sacral wound (5/23) Mixed skin yasmine FINAL  Left great toe (5/15) Acinetobacter baumannii  Sputum culture (5/19) Rare normal respiratory yasmine FINAL    Assessment:     Active Problems:    Sacral ulcer ([address] (5/14/2022)    1. Sacral wound infection secondary now to Acinetobacter baumannii and Enterococcus faecium, on Vancomycin and Unasyn, status post excisional wound debridement to the bone, Day #13 Vancomycin and Day #8 Unasyn. 2. Sepsis with persistent leukocytosis, elevated procal and CRP  3. Left foot wound, culture growing Acinetobacter baumannii, status post TMA  4. Possible ischemic hepatitiis with transaminitis following hypotensive episode, resolving  5. Positive blood cultures for Coagulase negative Staphylococci, probable contaminant  6. Hepatitis C antibody positive, resolved (negative HCV viral level)  7. New onset cardiomyopathy    Comment:  WBC increased today but procal and CRP decreasing. Plan:   1. Continue IV Unasyn and Vancomycin (for E. Faecium)  2.  In am, repeat CBC, CRP, procal           Signed By: Jake Blount MD     May 29, 2022

## 2022-05-29 NOTE — PROGRESS NOTES
Progress Note    Patient: Maddy Enriquez MRN: [id number]  SSN: [id number]    YOB: 1945  Age: 78 y.o. Sex: female      Admit Date: 5/14/2022    LOS: 15 days     Subjective:     78years old admitted for severe sepsis sacral decubitus ulcers acute hypoxic respiratory failure on vent support diabetes mellitus type 2 peripheral vascular disease, multiple strokes and bedbound. No new change    Objective:     Vitals:    05/29/22 0900 05/29/22 1000 05/29/22 1100 05/29/22 1136   BP: (!) 142/62 (!) 160/66 (!) 103/42    Pulse: 85 86 81 82   Resp: 13 12 18 10   Temp:   97.7 °F (36.5 °C)    SpO2: 100% 100% 100% 100%   Weight:       Height:            Intake and Output:  Current Shift: 05/29 0701 - 05/29 1900  In: -   Out: 800 [Urine:800]  Last three shifts: 05/27 1901 - 05/29 0700  In: 3355.1 [I.V.:1020.1]  Out: 4300 [Urine:4250; Drains:50]    Physical Exam:   General:  Alert, cooperative, no distress, appears stated age. Eyes:  Conjunctivae/corneas clear. PERRL, EOMs intact. Fundi benign   Ears:  Normal TMs and external ear canals both ears. Nose: Nares normal. Septum midline. Mucosa normal. No drainage or sinus tenderness. Mouth/Throat: Lips, mucosa, and tongue normal. Teeth and gums normal.   Neck: Supple, symmetrical, trachea midline, no adenopathy, thyroid: no enlargment/tenderness/nodules, no carotid bruit and no JVD. Back:   Symmetric, no curvature. ROM normal. No CVA tenderness. Lungs:   Clear to auscultation bilaterally. Heart:  Regular rate and rhythm, S1, S2 normal, no murmur, click, rub or gallop. Abdomen:   Soft, non-tender. Bowel sounds normal. No masses,  No organomegaly. Extremities: Extremities normal, atraumatic, no cyanosis or edema. Pulses: 2+ and symmetric all extremities. Skin: Skin color, texture, turgor normal. No rashes or lesions   Lymph nodes: Cervical, supraclavicular, and axillary nodes normal.   Neurologic: CNII-XII intact.  Normal strength, sensation and reflexes throughout. Lab/Data Review: All lab results for the last 24 hours reviewed. Recent Results (from the past 24 hour(s))   GLUCOSE, POC    Collection Time: 05/28/22  5:56 PM   Result Value Ref Range    Glucose (POC) 173 (H) 65 - 117 mg/dL    Performed by Angela Santos    GLUCOSE, POC    Collection Time: 05/29/22 12:20 AM   Result Value Ref Range    Glucose (POC) 159 (H) 65 - 117 mg/dL    Performed by Cyndi Barron    BLOOD GAS, ARTERIAL    Collection Time: 05/29/22  3:30 AM   Result Value Ref Range    pH 7.40 7.35 - 7.45      PCO2 29 (L) 35 - 45 mmHg    PO2 88 75 - 100 mmHg    O2 SAT 97 >95 %    BICARBONATE 20 (L) 22 - 26 mmol/L    BASE DEFICIT 5.4 (H) 0 - 2 mmol/L    O2 METHOD VENT      FIO2 21 %    MODE Assist Control/Volume Control      Tidal volume 500      SET RATE 10      EPAP/CPAP/PEEP 5      Sample source Arterial     CBC WITH AUTOMATED DIFF    Collection Time: 05/29/22  3:40 AM   Result Value Ref Range    WBC 13.5 (H) 3.6 - 11.0 K/uL    RBC 2.92 (L) 3.80 - 5.20 M/uL    HGB 8.3 (L) 11.5 - 16.0 g/dL    HCT 25.7 (L) 35.0 - 47.0 %    MCV 88.0 80.0 - 99.0 FL    MCH 28.4 26.0 - 34.0 PG    MCHC 32.3 30.0 - 36.5 g/dL    RDW 17.1 (H) 11.5 - 14.5 %    PLATELET 647 808 - 058 K/uL    MPV 10.4 8.9 - 12.9 FL    NRBC 0.0 0.0  WBC    ABSOLUTE NRBC 0.00 0.00 - 0.01 K/uL    NEUTROPHILS 68 32 - 75 %    LYMPHOCYTES 21 12 - 49 %    MONOCYTES 6 5 - 13 %    EOSINOPHILS 3 0 - 7 %    BASOPHILS 1 0 - 1 %    IMMATURE GRANULOCYTES 1 (H) 0 - 0.5 %    ABS. NEUTROPHILS 9.2 (H) 1.8 - 8.0 K/UL    ABS. LYMPHOCYTES 2.8 0.8 - 3.5 K/UL    ABS. MONOCYTES 0.9 0.0 - 1.0 K/UL    ABS. EOSINOPHILS 0.4 0.0 - 0.4 K/UL    ABS. BASOPHILS 0.1 0.0 - 0.1 K/UL    ABS. IMM.  GRANS. 0.1 (H) 0.00 - 0.04 K/UL    DF AUTOMATED     METABOLIC PANEL, COMPREHENSIVE    Collection Time: 05/29/22  3:40 AM   Result Value Ref Range    Sodium 144 136 - 145 mmol/L    Potassium 3.7 3.5 - 5.1 mmol/L    Chloride 116 (H) 97 - 108 mmol/L    CO2 22 21 - 32 mmol/L    Anion gap 6 5 - 15 mmol/L    Glucose 126 (H) 65 - 100 mg/dL    BUN 22 (H) 6 - 20 mg/dL    Creatinine 0.68 0.55 - 1.02 mg/dL    BUN/Creatinine ratio 32 (H) 12 - 20      GFR est AA >60 >60 ml/min/1.73m2    GFR est non-AA >60 >60 ml/min/1.73m2    Calcium 8.5 8.5 - 10.1 mg/dL    Bilirubin, total 0.4 0.2 - 1.0 mg/dL    AST (SGOT) 18 15 - 37 U/L    ALT (SGPT) 90 (H) 12 - 78 U/L    Alk.  phosphatase 99 45 - 117 U/L    Protein, total 6.3 (L) 6.4 - 8.2 g/dL    Albumin 1.5 (L) 3.5 - 5.0 g/dL    Globulin 4.8 (H) 2.0 - 4.0 g/dL    A-G Ratio 0.3 (L) 1.1 - 2.2     C REACTIVE PROTEIN, QT    Collection Time: 05/29/22  3:40 AM   Result Value Ref Range    C-Reactive protein 11.00 (H) 0.00 - 0.60 mg/dL   PROCALCITONIN    Collection Time: 05/29/22  3:40 AM   Result Value Ref Range    Procalcitonin 0.31 (H) 0 ng/mL   GLUCOSE, POC    Collection Time: 05/29/22 11:28 AM   Result Value Ref Range    Glucose (POC) 155 (H) 65 - 117 mg/dL    Performed by Veto Prim          Assessment:     Active Problems:    Sacral ulcer ([facility] [address] (5/14/2022)    As above    Plan:     Infectious disease notes reviewed continue present treatment  Cont management  Signed By: Radha Richey MD     May 29, 2022

## 2022-05-30 NOTE — PROGRESS NOTES
General Daily Progress Note          Patient Name:   Marcella Wong       YOB: 1945       Age:  78 y.o. 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 Cipro    Admitted again yesterday concerning for worsening of sacral ulcer    During last admission patient was seen by infectious disease and also seen by the vascular surgeon patient had debridement of wound during the last admission    Patient seen by the infectious disease yesterday    Sacral wound infection recent cultures growing Pseudomonas resistant to Zosyn and meropenem    5/17    Patient alert awake not in distress  Patient was seen by the vascular surgeon    Vascular surgery planned for laparoscopic loop colostomy placement and sacral debridement then wound vacuum  Also try to close the wound on the left foot with TMA    5/18    Resting in the bed not in any distress  Blood sugars running high    Seen by infectious disease continue vancomycin and start on Unasyn    5/19    And went to the OR intubated because of elevated LFT and elevated INR  Surgery was canceled    On ventilator 40% O2  Propofol drip    5/20    Patient on ventilator with 30% O2  On propofol drip    Hemoglobin dropped to 6.8    Patient's daughter at the bedside    5/20    Patient on ventilator 30% O2  On propofol drip  Getting PEG tube feeding    5/21    Patient still on ventilator 30% O2  On propofol drip  Liver Function improving    5/22  On ventilator with 30% O2 on propofol drip    5/24  Awaiting tissue culture results.    On ventilator with 30% O2 on propofol drip  Left transmetatarsal amputation and Sacral wound excisional wound debridement 13 x 15 cm to the bone completed yesterday. CXR: Hazy mid and lower lung reticular markings.  Dependent small to moderate volume,  right greater than left pleural effusions  Remarkable labs   WBC: 15.2   Hgb: 8.5   Na; 148  CRP: 13.60   Procal: 0.71      5/25    Patient on ventilator with 30% O2  Seen by the vascular surgeon and plan for surgery today    5/26  Patient on ventilator with 30% O2  Hypotensive on Levophed  On propofol drip    Surgery was canceled yesterday because patient unstable hemodynamically not cleared by the anesthesia    5/27    Patient on ventilator with 30% O2  Hypotensive on Levophed  Getting PEG tube feeding    5/30     Patient on ventilator with 21% O2  On propofol drip  Off pressor    Objective:     Visit Vitals  BP (!) 147/58   Pulse 84   Temp 98.7 °F (37.1 °C)   Resp 12   Ht 5' 6.93\" (1.7 m)   Wt 84.6 kg (186 lb 8.2 oz)   SpO2 100%   Breastfeeding No   BMI 29.27 kg/m²        Recent Results (from the past 24 hour(s))   GLUCOSE, POC    Collection Time: 05/29/22 11:28 AM   Result Value Ref Range    Glucose (POC) 155 (H) 65 - 117 mg/dL    Performed by Kimberly Carvajal    GLUCOSE, POC    Collection Time: 05/29/22  5:55 PM   Result Value Ref Range    Glucose (POC) 153 (H) 65 - 117 mg/dL    Performed by Kimberly Carvajal    GLUCOSE, POC    Collection Time: 05/29/22  9:29 PM   Result Value Ref Range    Glucose (POC) 188 (H) 65 - 117 mg/dL    Performed by Karsten Louis RN (Marily)    GLUCOSE, POC    Collection Time: 05/29/22 11:23 PM   Result Value Ref Range    Glucose (POC) 194 (H) 65 - 117 mg/dL    Performed by Karsten Louis RN (Marily)    METABOLIC PANEL, COMPREHENSIVE    Collection Time: 05/30/22  2:47 AM   Result Value Ref Range    Sodium 140 136 - 145 mmol/L    Potassium 3.3 (L) 3.5 - 5.1 mmol/L    Chloride 113 (H) 97 - 108 mmol/L    CO2 18 (L) 21 - 32 mmol/L    Anion gap 9 5 - 15 mmol/L    Glucose 209 (H) 65 - 100 mg/dL    BUN 25 (H) 6 - 20 mg/dL    Creatinine 0.77 0.55 - 1.02 mg/dL    BUN/Creatinine ratio 32 (H) 12 - 20      GFR est AA >60 >60 ml/min/1.73m2    GFR est non-AA >60 >60 ml/min/1.73m2    Calcium 8.5 8.5 - 10.1 mg/dL    Bilirubin, total 0.3 0.2 - 1.0 mg/dL    AST (SGOT) 14 (L) 15 - 37 U/L    ALT (SGPT) 70 12 - 78 U/L    Alk. phosphatase 91 45 - 117 U/L    Protein, total 7.0 6.4 - 8.2 g/dL    Albumin 1.4 (L) 3.5 - 5.0 g/dL    Globulin 5.6 (H) 2.0 - 4.0 g/dL    A-G Ratio 0.3 (L) 1.1 - 2.2     VANCOMYCIN, RANDOM    Collection Time: 05/30/22  2:47 AM   Result Value Ref Range    Vancomycin, random 12.4 ug/mL   CBC WITH AUTOMATED DIFF    Collection Time: 05/30/22  2:47 AM   Result Value Ref Range    WBC 13.5 (H) 3.6 - 11.0 K/uL    RBC 2.78 (L) 3.80 - 5.20 M/uL    HGB 7.8 (L) 11.5 - 16.0 g/dL    HCT 24.5 (L) 35.0 - 47.0 %    MCV 88.1 80.0 - 99.0 FL    MCH 28.1 26.0 - 34.0 PG    MCHC 31.8 30.0 - 36.5 g/dL    RDW 17.0 (H) 11.5 - 14.5 %    PLATELET 691 324 - 080 K/uL    MPV 10.7 8.9 - 12.9 FL    NRBC 0.0 0.0  WBC    ABSOLUTE NRBC 0.00 0.00 - 0.01 K/uL    NEUTROPHILS 72 32 - 75 %    LYMPHOCYTES 17 12 - 49 %    MONOCYTES 6 5 - 13 %    EOSINOPHILS 3 0 - 7 %    BASOPHILS 1 0 - 1 %    IMMATURE GRANULOCYTES 1 (H) 0 - 0.5 %    ABS. NEUTROPHILS 9.8 (H) 1.8 - 8.0 K/UL    ABS. LYMPHOCYTES 2.3 0.8 - 3.5 K/UL    ABS. MONOCYTES 0.8 0.0 - 1.0 K/UL    ABS. EOSINOPHILS 0.4 0.0 - 0.4 K/UL    ABS. BASOPHILS 0.1 0.0 - 0.1 K/UL    ABS. IMM.  GRANS. 0.1 (H) 0.00 - 0.04 K/UL    DF AUTOMATED     PROCALCITONIN    Collection Time: 05/30/22  2:47 AM   Result Value Ref Range    Procalcitonin 0.24 (H) 0 ng/mL   C REACTIVE PROTEIN, QT    Collection Time: 05/30/22  2:47 AM   Result Value Ref Range    C-Reactive protein 12.60 (H) 0.00 - 0.60 mg/dL   MAGNESIUM    Collection Time: 05/30/22  2:47 AM   Result Value Ref Range    Magnesium 1.4 (L) 1.6 - 2.4 mg/dL   PHOSPHORUS    Collection Time: 05/30/22  2:47 AM   Result Value Ref Range    Phosphorus 2.8 2.6 - 4.7 mg/dL   BLOOD GAS, ARTERIAL    Collection Time: 05/30/22  4:25 AM   Result Value Ref Range    pH 7.39 7.35 - 7.45      PCO2 29 (L) 35 - 45 mmHg    PO2 86 75 - 100 mmHg    O2 SAT 98 >95 %    BICARBONATE 18 (L) 22 - 26 mmol/L    BASE DEFICIT 7.3 (H) 0 - 2 mmol/L    O2 METHOD VENT      FIO2 21.0 %    MODE Assist Control/Volume Control      Tidal volume 500      SET RATE 10      EPAP/CPAP/PEEP 5.0      SITE Right Radial      EVELIN'S TEST PASS     GLUCOSE, POC    Collection Time: 05/30/22  5:46 AM   Result Value Ref Range    Glucose (POC) 202 (H) 65 - 117 mg/dL    Performed by Camryn Coombs      [unfilled]      Review of Systems    Not a good historian e. Physical Exam:      Constitutional: On ventilator  HENT:   Head: Normocephalic and atraumatic. Eyes: Pupils are equal, round, and reactive to light. EOM are normal.   Cardiovascular: Normal rate, regular rhythm and normal heart sounds. Pulmonary/Chest: Breath sounds normal. No wheezes. No rales. Exhibits no tenderness. Abdominal: Soft. Bowel sounds are normal. There is no abdominal tenderness. There is no rebound and no guarding. Musculoskeletal: Normal range of motion. Neurological: On ventilator   skin sacral decubitus ulcer and left foot wound right big toe amputation    XR CHEST PORT   Final Result   No interval change or acute process. XR CHEST PORT   Final Result   ET tube retracted from previous, with tip now at the level of the   thoracic inlet, 8-9 cm above the jennifer. Stable appearance of right central   line. Stable mild enlargement of cardiopericardial silhouette. No evidence of   pulmonary edema, air space pneumonia, or pleural effusion. No evidence of   pneumothorax. Some structures are partially obscured by overlying monitoring   electrodes. XR CHEST PORT   Final Result   Basilar airspace disease, possibly subsegmental atelectasis, stable. XR CHEST PORT   Final Result   Cardiomegaly with vascular congestion.  Stable appearance of ET tube. Right central line placed, without radiographic evidence of complication. IR INSERT NON TUNL CVC OVER 5 YRS   Final Result   Successful placement of a triple-lumen central venous catheter. The   catheter is ready for use. XR CHEST PORT   Final Result      XR CHEST PORT   Final Result      XR CHEST PORT   Final Result      CT ABD PELV WO CONT   Final Result   Third spacing of fluids with small volume ascites, small-moderate   pleural effusions, and mild body wall edema. Bibasilar atelectasis. XR CHEST PORT   Final Result   Similar findings compared to single view chest 1 day earlier. XR CHEST PORT   Final Result   Findings/IMPRESSION: Stable appearance of endotracheal tube. Stable mild   enlargement of cardiomediastinal silhouette. Central vascular congestion with   bilateral perihilar and basilar opacities, consistent with edema and/or   pneumonia, right greater than left. Possible right pleural effusion. No   pneumothorax. XR CHEST PORT   Final Result   Bibasilar opacities, possibly increased on the right. XR CHEST PORT   Final Result   No significant change allowing for difference in technique and   positioning. Single portable AP view compared to yesterday. Suboptimal inspiratory film   compromises visualization of the lower lung fields. Monitoring equipment   overlies the body. The tip of the tube is approximately 3 cm above the jennifer. Mild apparent cardiomegaly. Left heart border and left diaphragm obscured. Hazy airspace opacification both lung bases may be a combination of atelectasis,   pneumonia, or edema. No large effusion. Negative for pneumothorax. XR CHEST PORT   Final Result   No significant change allowing for difference in technique and   positioning. Single portable AP view compared to yesterday. Rotation to the right. Monitoring   equipment overlies the body.  Suboptimal inspiratory film compromises   visualization of the lower lung fields. Mild cardiomegaly. The tip of the ET tube is approximately 3 cm above the jennifer. Mild basal interstitial edema. No new consolidation. No pleural effusion or   pneumothorax. XR CHEST PORT   Final Result   1. The endotracheal tube is in satisfactory position. 2.  There has been a partial interval resolution in the pulmonary interstitial   edema pattern. XR CHEST PORT   Final Result   Ill-defined haziness in the mid to lower lung zones suspect for mild   atelectatic changes and/or interstitial fluid or pleural fluid. US ABD LTD   Final Result   1. Question pericholecystic fluid. No identified gallstones or sludge. 2. Right pleural effusion. 3. Increased right renal echotexture for medical renal disease. XR CHEST PORT   Final Result   Intubation. Findings suggesting hydrostatic edema. XR CHEST PORT   Final Result   No evidence of an acute cardiopulmonary process.       IR FLUORO GUIDE PLC CVAD    (Results Pending)   IR US GUIDED VASCULAR ACCESS    (Results Pending)   XR CHEST PORT    (Results Pending)        Recent Results (from the past 24 hour(s))   GLUCOSE, POC    Collection Time: 05/29/22 11:28 AM   Result Value Ref Range    Glucose (POC) 155 (H) 65 - 117 mg/dL    Performed by [address] [place] [address], POC    Collection Time: 05/29/22  5:55 PM   Result Value Ref Range    Glucose (POC) 153 (H) 65 - 117 mg/dL    Performed by Abdiel Berger    GLUCOSE, POC    Collection Time: 05/29/22  9:29 PM   Result Value Ref Range    Glucose (POC) 188 (H) 65 - 117 mg/dL    Performed by Ken Cole RN (Marily)    GLUCOSE, POC    Collection Time: 05/29/22 11:23 PM   Result Value Ref Range    Glucose (POC) 194 (H) 65 - 117 mg/dL    Performed by Ken Cole RN (Marily)    METABOLIC PANEL, COMPREHENSIVE    Collection Time: 05/30/22  2:47 AM   Result Value Ref Range    Sodium 140 136 - 145 mmol/L    Potassium 3.3 (L) 3.5 - 5.1 mmol/L    Chloride 113 (H) 97 - 108 mmol/L    CO2 18 (L) 21 - 32 mmol/L    Anion gap 9 5 - 15 mmol/L    Glucose 209 (H) 65 - 100 mg/dL    BUN 25 (H) 6 - 20 mg/dL    Creatinine 0.77 0.55 - 1.02 mg/dL    BUN/Creatinine ratio 32 (H) 12 - 20      GFR est AA >60 >60 ml/min/1.73m2    GFR est non-AA >60 >60 ml/min/1.73m2    Calcium 8.5 8.5 - 10.1 mg/dL    Bilirubin, total 0.3 0.2 - 1.0 mg/dL    AST (SGOT) 14 (L) 15 - 37 U/L    ALT (SGPT) 70 12 - 78 U/L    Alk. phosphatase 91 45 - 117 U/L    Protein, total 7.0 6.4 - 8.2 g/dL    Albumin 1.4 (L) 3.5 - 5.0 g/dL    Globulin 5.6 (H) 2.0 - 4.0 g/dL    A-G Ratio 0.3 (L) 1.1 - 2.2     VANCOMYCIN, RANDOM    Collection Time: 05/30/22  2:47 AM   Result Value Ref Range    Vancomycin, random 12.4 ug/mL   CBC WITH AUTOMATED DIFF    Collection Time: 05/30/22  2:47 AM   Result Value Ref Range    WBC 13.5 (H) 3.6 - 11.0 K/uL    RBC 2.78 (L) 3.80 - 5.20 M/uL    HGB 7.8 (L) 11.5 - 16.0 g/dL    HCT 24.5 (L) 35.0 - 47.0 %    MCV 88.1 80.0 - 99.0 FL    MCH 28.1 26.0 - 34.0 PG    MCHC 31.8 30.0 - 36.5 g/dL    RDW 17.0 (H) 11.5 - 14.5 %    PLATELET 205 466 - 389 K/uL    MPV 10.7 8.9 - 12.9 FL    NRBC 0.0 0.0  WBC    ABSOLUTE NRBC 0.00 0.00 - 0.01 K/uL    NEUTROPHILS 72 32 - 75 %    LYMPHOCYTES 17 12 - 49 %    MONOCYTES 6 5 - 13 %    EOSINOPHILS 3 0 - 7 %    BASOPHILS 1 0 - 1 %    IMMATURE GRANULOCYTES 1 (H) 0 - 0.5 %    ABS. NEUTROPHILS 9.8 (H) 1.8 - 8.0 K/UL    ABS. LYMPHOCYTES 2.3 0.8 - 3.5 K/UL    ABS. MONOCYTES 0.8 0.0 - 1.0 K/UL    ABS. EOSINOPHILS 0.4 0.0 - 0.4 K/UL    ABS. BASOPHILS 0.1 0.0 - 0.1 K/UL    ABS. IMM.  GRANS. 0.1 (H) 0.00 - 0.04 K/UL    DF AUTOMATED     PROCALCITONIN    Collection Time: 05/30/22  2:47 AM   Result Value Ref Range    Procalcitonin 0.24 (H) 0 ng/mL   C REACTIVE PROTEIN, QT    Collection Time: 05/30/22  2:47 AM   Result Value Ref Range    C-Reactive protein 12.60 (H) 0.00 - 0.60 mg/dL   MAGNESIUM    Collection Time: 05/30/22  2:47 AM   Result Value Ref Range    Magnesium 1.4 (L) 1.6 - 2.4 mg/dL   PHOSPHORUS    Collection Time: 05/30/22  2:47 AM   Result Value Ref Range    Phosphorus 2.8 2.6 - 4.7 mg/dL   BLOOD GAS, ARTERIAL    Collection Time: 05/30/22  4:25 AM   Result Value Ref Range    pH 7.39 7.35 - 7.45      PCO2 29 (L) 35 - 45 mmHg    PO2 86 75 - 100 mmHg    O2 SAT 98 >95 %    BICARBONATE 18 (L) 22 - 26 mmol/L    BASE DEFICIT 7.3 (H) 0 - 2 mmol/L    O2 METHOD VENT      FIO2 21.0 %    MODE Assist Control/Volume Control      Tidal volume 500      SET RATE 10      EPAP/CPAP/PEEP 5.0      SITE Right Radial      EVELIN'S TEST PASS     GLUCOSE, POC    Collection Time: 05/30/22  5:46 AM   Result Value Ref Range    Glucose (POC) 202 (H) 65 - 117 mg/dL    Performed by Belen Washington        Results     Procedure Component Value Units Date/Time    CULTURE, TISSUE Buelah Trinity STAIN [[id number]] Collected: 05/23/22 1815    Order Status: Completed Specimen: Tissue Updated: 05/26/22 1510     Special Requests: No Special Requests        GRAM STAIN Rare WBCs seen         Few Gram Negative Rods        Culture result: Heavy  Mixed skin yasmine isolated       CULTURE, RESPIRATORY/SPUTUM/BRONCH Buelah Trinity STAIN [[id number]] Collected: 05/19/22 1448    Order Status: Completed Specimen: Sputum Updated: 05/21/22 0813     Special Requests: No Special Requests        GRAM STAIN 1+ WBCs seen         no epithelial cells seen         No organisms seen        Culture result:       Rare Normal respiratory yasmine                 Labs:     Recent Labs     05/30/22 0247 05/29/22  0340   WBC 13.5* 13.5*   HGB 7.8* 8.3*   HCT 24.5* 25.7*    388     Recent Labs     05/30/22 0247 05/29/22  0340 05/28/22  0430    144 145   K 3.3* 3.7 3.5   * 116* 116*   CO2 18* 22 22   BUN 25* 22* 21*   CREA 0.77 0.68 0.53*   * 126* 155*   CA 8.5 8.5 8.1*   MG 1.4*  --   --    PHOS 2.8  --   --      Recent Labs     05/30/22 0247 05/29/22  0340 05/28/22  0430   ALT 70 90* 109*   AP 91 99 95   TBILI 0.3 0.4 0.4   TP 7.0 6.3* 5.9*   ALB 1.4* 1.5* 1.4*   GLOB 5.6* 4.8* 4.5*     No results for input(s): INR, PTP, APTT, INREXT, INREXT in the last 72 hours. No results for input(s): FE, TIBC, PSAT, FERR in the last 72 hours. No results found for: FOL, RBCF   Recent Labs     05/30/22  0425 05/29/22  0330   PH 7.39 7.40   PCO2 29* 29*   PO2 86 88     No results for input(s): CPK, CKNDX, TROIQ in the last 72 hours.     No lab exists for component: CPKMB  Lab Results   Component Value Date/Time    Cholesterol, total 124 03/08/2022 07:40 AM    HDL Cholesterol 30 03/08/2022 07:40 AM    LDL,Direct 79 06/28/2021 12:00 AM    LDL, calculated 44.8 03/08/2022 07:40 AM    Triglyceride 202 (H) 05/16/2022 06:20 AM    CHOL/HDL Ratio 4.1 03/08/2022 07:40 AM     Lab Results   Component Value Date/Time    Glucose (POC) 202 (H) 05/30/2022 05:46 AM    Glucose (POC) 194 (H) 05/29/2022 11:23 PM    Glucose (POC) 188 (H) 05/29/2022 09:29 PM    Glucose (POC) 153 (H) 05/29/2022 05:55 PM    Glucose (POC) 155 (H) 05/29/2022 11:28 AM     Lab Results   Component Value Date/Time    Color Yellow/Straw 05/14/2022 08:08 PM    Appearance Clear 05/14/2022 08:08 PM    Specific gravity 1.014 05/14/2022 08:08 PM    pH (UA) 6.0 05/14/2022 08:08 PM    Protein 30 (A) 05/14/2022 08:08 PM    Glucose 50 (A) 05/14/2022 08:08 PM    Ketone Negative 05/14/2022 08:08 PM    Bilirubin Negative 05/14/2022 08:08 PM    Urobilinogen 0.1 05/14/2022 08:08 PM    Nitrites Negative 05/14/2022 08:08 PM    Leukocyte Esterase Trace (A) 05/14/2022 08:08 PM    Bacteria Negative 05/14/2022 08:08 PM    Bacteria Rare (A) 05/14/2022 08:08 PM    WBC 0-4 05/14/2022 08:08 PM    WBC 4 05/14/2022 08:08 PM    RBC 0-5 05/14/2022 08:08 PM    RBC 1 05/14/2022 08:08 PM         Assessment:   Acute respiratory failure on ventilator 30% O2  Sacral decubitus ulcer postdebridement  Sepsis with leukocytosis elevated procalcitonin and CRP  Left foot wound  Recent removal of left great toe and second toe  CVA with PEG tube placement  History of aspiration pneumonia  History of chronic kidney disease  CAD with ejection fraction about 20 to 25%  Hypertension  Hyperlipidemia  Anemia of chronic disease  Hyperkalemia  Static post transfusion  Uncontrolled diabetes  Hepatic shock/improving  Coagulopathy  Elevated  Troponin  Bacteremia with coagulase-negative Staphylococcus  Procedure:  1. Left transmetatarsal amputation. 2.  Sacral wound excisional wound debridement 13 x 15 cm to the bone. Hypotension on Levophed  Hypokalemia/replace potassium  Plan:     Unasyn 3 g every 8 hours  Aspirin 81 mg daily  Questran 4 g twice a day  Pepcid 20 twice daily  Ferrous sulfate 325 mg twice a day  Lovenox 40 subcu daily  Lasix 20 mg IV every 12 hours  Pepcid 20 twice daily  Ferrous sulfate 325 mg twice a day  Lantus 50 units subcu daily and 10 units in p.m.   Gabapentin 300 mg twice a day  Hydralazine 12.5 mg 3 times a day  Metoprolol 25 daily  Vitamin K 10 mg daily  Replace potassium  Entresto 1 tablet twice a day  Vancomycin with pharmacy protocol        Repeat  the labs overall prognosis very poor        laparoscopic loop colostomy for fecal diversion canceled    Continue current medications    Discussed with ICU nurse         Current Facility-Administered Medications:     aspirin chewable tablet 81 mg, 81 mg, Per G Tube, DAILY, Israel Soto MD, 81 mg at 05/30/22 0919    NOREPINephrine (LEVOPHED) 8 mg in 0.9% NS 250ml infusion, 0.5-16 mcg/min, IntraVENous, TITRATE, Israel Soto MD, Paused at 05/29/22 2046    sodium chloride (NS) flush 5-40 mL, 5-40 mL, IntraVENous, Q8H, Mynor Matthew MD, 10 mL at 05/30/22 0547    sodium chloride (NS) flush 5-40 mL, 5-40 mL, IntraVENous, PRN, Mynor Harper MD    0.9% sodium chloride infusion 250 mL, 250 mL, IntraVENous, PRN, Jeb Bob MD    furosemide (LASIX) injection 20 mg, 20 mg, IntraVENous, Q12H, Jeb Bob MD, 20 mg at 05/30/22 0919    Vancomycin - Reminder to please draw level 5/26 @ 0400, , Other, Rx Dosing/Monitoring, Lilliam Rivas W,     sodium chloride (NS) flush 5-40 mL, 5-40 mL, IntraVENous, Q8H, Jose Bob MD, 10 mL at 05/30/22 0547    sodium chloride (NS) flush 5-40 mL, 5-40 mL, IntraVENous, PRN, Jose Bob MD    ondansetron (ZOFRAN ODT) tablet 4 mg, 4 mg, Oral, Q8H PRN **OR** ondansetron (ZOFRAN) injection 4 mg, 4 mg, IntraVENous, Q6H PRN, Jose Bob MD    enoxaparin (LOVENOX) injection 40 mg, 40 mg, SubCUTAneous, DAILY, Jose Bob MD, 40 mg at 05/30/22 0919    dextrose 5% infusion, 30 mL/hr, IntraVENous, CONTINUOUS, Kalpesh Lara MD, Last Rate: 30 mL/hr at 05/30/22 0611, 30 mL/hr at 05/30/22 0611    insulin lispro (HUMALOG) injection, , SubCUTAneous, Q6H, Jose Bob MD, 2 Units at 05/30/22 0550    glucose chewable tablet 16 g, 4 Tablet, Oral, PRN, Jose Bob MD    glucagon [facility]) injection 1 mg, 1 mg, IntraMUSCular, PRN, Jose Bob MD    dextrose 10% infusion 0-250 mL, 0-250 mL, IntraVENous, PRN, Jose Bob MD    hydrALAZINE (APRESOLINE) tablet 12.5 mg, 12.5 mg, Oral, TID, Jose Bob MD, 12.5 mg at 05/30/22 0919    sacubitriL-valsartan (ENTRESTO) 24-26 mg tablet 1 Tablet, 1 Tablet, Oral, Q12H, Jose Bob MD, 1 Tablet at 05/30/22 0919    metoprolol succinate (TOPROL-XL) XL tablet 25 mg, 25 mg, Oral, DAILY, Jose Bob MD, 25 mg at 05/30/22 0919    ampicillin-sulbactam (UNASYN) 3 g in 0.9% sodium chloride (MBP/ADV) 100 mL MBP, 3 g, IntraVENous, Q8H, Jose Bob MD, Last Rate: 200 mL/hr at 05/30/22 0550, 3 g at 05/30/22 0550    dextrose 10% infusion 0-250 mL, 0-250 mL, IntraVENous, PRN, Jose Bob MD, 125 mL at 05/19/22 0537    insulin glargine (LANTUS) injection 10 Units, 10 Units, SubCUTAneous, QHS, Jose Bob MD, 10 Units at 05/29/22 2152    propofol (DIPRIVAN) 10 mg/mL infusion, 0-50 mcg/kg/min, IntraVENous, TITRATE, Jose Bob MD, Last Rate: 9.5 mL/hr at 05/30/22 0920, 20 mcg/kg/min at 05/30/22 0920    0.9% sodium chloride infusion 250 mL, 250 mL, IntraVENous, PRN, Yonathan Bob MD, Last Rate: 15 mL/hr at 05/22/22 0820, Rate Verify at 05/22/22 0820    sodium hypochlorite (HALF STRENGTH DAKIN'S) 0.25% irrigation (bottle), , Topical, BID, Yonathan Bob MD, Given at 05/30/22 2199    Vancomycin Pharmacy Dosing, , Other, Rx Dosing/Monitoring, Yonathan Bob MD    sodium chloride (NS) flush 5-40 mL, 5-40 mL, IntraVENous, Q8H, Yonathan Bob MD, 10 mL at 05/30/22 0547    acetaminophen (TYLENOL) tablet 650 mg, 650 mg, Oral, Q6H PRN, 650 mg at 05/22/22 0556 **OR** acetaminophen (TYLENOL) suppository 650 mg, 650 mg, Rectal, Q6H PRN, Yonathan Bob MD, 650 mg at 05/16/22 2223    polyethylene glycol (MIRALAX) packet 17 g, 17 g, Oral, DAILY PRN, Yonathan Bob MD    ondansetron (ZOFRAN ODT) tablet 4 mg, 4 mg, Oral, Q8H PRN **OR** ondansetron (ZOFRAN) injection 4 mg, 4 mg, IntraVENous, Q6H PRN, Yonathan Bob MD    famotidine (PEPCID) tablet 20 mg, 20 mg, Oral, BID, Yonathan Bob MD, 20 mg at 05/30/22 0919    acidophilus-pectin, citrus tablet 2 Tablet, 2 Tablet, Oral, DAILY, Yonathan Bob MD, 2 Tablet at 05/30/22 0919    albuterol-ipratropium (DUO-NEB) 2.5 MG-0.5 MG/3 ML, 3 mL, Nebulization, Q6H PRN, Yonathan Bob MD    artificial saliva (MOUTH KOTE) 1 Spray, 1 Spray, Oral, TID, Yonathan Bob MD, 1 [place] at 05/30/22 0921    brimonidine (ALPHAGAN) 0.2 % ophthalmic solution 1 Drop, 1 Drop, Both Eyes, TID, Yonathan Bob MD, 1 Drop at 05/30/22 0921    cholestyramine-aspartame (QUESTRAN LIGHT) packet 4 g, 4 g, Oral, BID WITH MEALS, Yonathan Bob MD, 4 g at 05/30/22 0919    [Held by provider] fenofibrate nanocrystallized (TRICOR) tablet 48 mg, 48 mg, Oral, DAILY, Yonathan Bob MD, 48 mg at 05/19/22 2835    ferrous sulfate tablet 325 mg, 325 mg, Oral, BID, Yonathan Bob MD, 325 mg at 05/30/22 0919    gabapentin (NEURONTIN) capsule 300 mg, 300 mg, Oral, BID, Yonathan Bob MD, 300 mg at 05/30/22 0919    insulin glargine (LANTUS) injection 50 Units, 50 Units, SubCUTAneous, DAILY, April Bob MD, 50 Units at 05/30/22 0922    latanoprost (XALATAN) 0.005 % ophthalmic solution 1 Drop, 1 Drop, Right Eye, QPM, April Bob MD, 1 Drop at 05/29/22 1701    traMADoL (ULTRAM) tablet 25 mg, 25 mg, Oral, Q12H PRN, April Bob MD, 25 mg at 05/18/22 9865

## 2022-05-30 NOTE — PROGRESS NOTES
IMPRESSION:   1. Acute hypoxic respiratory failure remains on the ventilator  2. Sacral decubiti ulcer with Acinetobacter and Enterococcus  3. Severe sepsis with septic shock resolved on no pressors  4. Left foot wound  5. Transaminitis continue to improve  6. Shock liver  7. Hypokalemia repleted  8. Symptomatic anemia stable after transfusion  9. Cardiomyopathy  10. Mild pulmonary edema      RECOMMENDATIONS/PLAN:   1. ICU monitoring  1. Ventilator for mechanical life support and prevent respiratory arrest with protective lung strategies will decrease the rate she is on 21% FiO2  2. Possible surgery next we will keep on ventilator and also she is hemodynamically unstable on Levophed  3. Pending loop colostomy possible surgery which has been postponed till next week  4. ET tube is 3.5 cm above the jennifer will advise 1 cm and repeat chest x-ray  5. Liver enzymes improved   6. Hemoglobin trending down we will continue to monitor  7. Patient underwent on 5/23 left metatarsal amputation and sacral wound debridement  8. Severe sepsis with decubiti ulcer   9. Patient is on Unasyn  10. Chest x-ray shows mild congestive changes with fluid in the minor fissure  11. Anemia hemoglobin improved after transfusion 5/20     [x] High complexity decision making was performed  [x] See my orders for details  HPI   82-year-old lady came in because of leg pain past medical history of hypertension diabetes mellitus peripheral vascular disease CVA she is bedbound she has leg wound and also has sacral decubiti ulcer and she was scheduled for laparoscopic colostomy and sacral wound debridement and amputation of the left tarsometatarsal because of wound infection but her condition got worse her liver enzyme was elevated INR was also elevated she was intubated transferred to ICU was getting vancomycin and Unasyn started on Levophed because of low blood pressure.     PMH:  has a past medical history of Anemia, Chronic kidney disease, Diabetes mellitus ([facility] Hemiplegia affecting dominant side, post-stroke ([facility] (05/04/2022), HTN (hypertension), Hyperkalemia, Hyperlipidemia, Ill-defined condition, Neuropathy, PAD (peripheral artery disease) ([facility] [address] Sciatica, Stroke ([facility] [address] and UTI (urinary tract infection). PSH:   has a past surgical history that includes hx other surgical (Bilateral); hx amputation (Right); hx other surgical; hx cervical fusion; hx vascular stent (Bilateral); hx vascular stent (Bilateral); hx gi; and ir insert non tunl cvc over 5 yrs (5/27/2022). FHX: family history includes Cancer in her mother; Diabetes in her mother; Hypertension in her mother. SHX:  reports that she has never smoked. She has never used smokeless tobacco. She reports previous alcohol use. She reports that she does not use drugs.     ALL: No Known Allergies     MEDS:   [x] Reviewed - As Below   [] Not reviewed    Current Facility-Administered Medications   Medication    aspirin chewable tablet 81 mg    NOREPINephrine (LEVOPHED) 8 mg in 0.9% NS 250ml infusion    sodium chloride (NS) flush 5-40 mL    sodium chloride (NS) flush 5-40 mL    0.9% sodium chloride infusion 250 mL    furosemide (LASIX) injection 20 mg    Vancomycin - Reminder to please draw level 5/26 @ 0400    sodium chloride (NS) flush 5-40 mL    sodium chloride (NS) flush 5-40 mL    ondansetron (ZOFRAN ODT) tablet 4 mg    Or    ondansetron (ZOFRAN) injection 4 mg    enoxaparin (LOVENOX) injection 40 mg    dextrose 5% infusion    insulin lispro (HUMALOG) injection    glucose chewable tablet 16 g    glucagon (GLUCAGEN) injection 1 mg    dextrose 10% infusion 0-250 mL    hydrALAZINE (APRESOLINE) tablet 12.5 mg    sacubitriL-valsartan (ENTRESTO) 24-26 mg tablet 1 Tablet    metoprolol succinate (TOPROL-XL) XL tablet 25 mg    ampicillin-sulbactam (UNASYN) 3 g in 0.9% sodium chloride (MBP/ADV) 100 mL MBP    dextrose 10% infusion 0-250 mL    insulin glargine (LANTUS) injection 10 Units  propofol (DIPRIVAN) 10 mg/mL infusion    0.9% sodium chloride infusion 250 mL    sodium hypochlorite (HALF STRENGTH DAKIN'S) 0.25% irrigation (bottle)    Vancomycin Pharmacy Dosing    sodium chloride (NS) flush 5-40 mL    acetaminophen (TYLENOL) tablet 650 mg    Or    acetaminophen (TYLENOL) suppository 650 mg    polyethylene glycol (MIRALAX) packet 17 g    ondansetron (ZOFRAN ODT) tablet 4 mg    Or    ondansetron (ZOFRAN) injection 4 mg    famotidine (PEPCID) tablet 20 mg    acidophilus-pectin, citrus tablet 2 Tablet    albuterol-ipratropium (DUO-NEB) 2.5 MG-0.5 MG/3 ML    artificial saliva (MOUTH KOTE) 1 Spray    brimonidine (ALPHAGAN) 0.2 % ophthalmic solution 1 Drop    cholestyramine-aspartame (QUESTRAN LIGHT) packet 4 g    [Held by provider] fenofibrate nanocrystallized (TRICOR) tablet 48 mg    ferrous sulfate tablet 325 mg    gabapentin (NEURONTIN) capsule 300 mg    insulin glargine (LANTUS) injection 50 Units    latanoprost (XALATAN) 0.005 % ophthalmic solution 1 Drop    traMADoL (ULTRAM) tablet 25 mg      MAR reviewed and pertinent medications noted or modified as needed   Current Facility-Administered Medications   Medication    aspirin chewable tablet 81 mg    NOREPINephrine (LEVOPHED) 8 mg in 0.9% NS 250ml infusion    sodium chloride (NS) flush 5-40 mL    sodium chloride (NS) flush 5-40 mL    0.9% sodium chloride infusion 250 mL    furosemide (LASIX) injection 20 mg    Vancomycin - Reminder to please draw level 5/26 @ 0400    sodium chloride (NS) flush 5-40 mL    sodium chloride (NS) flush 5-40 mL    ondansetron (ZOFRAN ODT) tablet 4 mg    Or    ondansetron (ZOFRAN) injection 4 mg    enoxaparin (LOVENOX) injection 40 mg    dextrose 5% infusion    insulin lispro (HUMALOG) injection    glucose chewable tablet 16 g    glucagon (GLUCAGEN) injection 1 mg    dextrose 10% infusion 0-250 mL    hydrALAZINE (APRESOLINE) tablet 12.5 mg    sacubitriL-valsartan (ENTRESTO) 24-26 mg tablet 1 Tablet    metoprolol succinate (TOPROL-XL) XL tablet 25 mg    ampicillin-sulbactam (UNASYN) 3 g in 0.9% sodium chloride (MBP/ADV) 100 mL MBP    dextrose 10% infusion 0-250 mL    insulin glargine (LANTUS) injection 10 Units    propofol (DIPRIVAN) 10 mg/mL infusion    0.9% sodium chloride infusion 250 mL    sodium hypochlorite (HALF STRENGTH DAKIN'S) 0.25% irrigation (bottle)    Vancomycin Pharmacy Dosing    sodium chloride (NS) flush 5-40 mL    acetaminophen (TYLENOL) tablet 650 mg    Or    acetaminophen (TYLENOL) suppository 650 mg    polyethylene glycol (MIRALAX) packet 17 g    ondansetron (ZOFRAN ODT) tablet 4 mg    Or    ondansetron (ZOFRAN) injection 4 mg    famotidine (PEPCID) tablet 20 mg    acidophilus-pectin, citrus tablet 2 Tablet    albuterol-ipratropium (DUO-NEB) 2.5 MG-0.5 MG/3 ML    artificial saliva (MOUTH KOTE) 1 Spray    brimonidine (ALPHAGAN) 0.2 % ophthalmic solution 1 Drop    cholestyramine-aspartame (QUESTRAN LIGHT) packet 4 g    [Held by provider] fenofibrate nanocrystallized (TRICOR) tablet 48 mg    ferrous sulfate tablet 325 mg    gabapentin (NEURONTIN) capsule 300 mg    insulin glargine (LANTUS) injection 50 Units    latanoprost (XALATAN) 0.005 % ophthalmic solution 1 Drop    traMADoL (ULTRAM) tablet 25 mg      PMH:  has a past medical history of Anemia, Chronic kidney disease, Diabetes mellitus ([address] Hemiplegia affecting dominant side, post-stroke ([address] (05/04/2022), HTN (hypertension), Hyperkalemia, Hyperlipidemia, Ill-defined condition, Neuropathy, PAD (peripheral artery disease) ([address] Sciatica, Stroke ([address] and UTI (urinary tract infection). PSH:   has a past surgical history that includes hx other surgical (Bilateral); hx amputation (Right); hx other surgical; hx cervical fusion; hx vascular stent (Bilateral); hx vascular stent (Bilateral); hx gi; and ir insert non tunl cvc over 5 yrs (5/27/2022).    FHX: family history includes Cancer in her mother; Diabetes in her mother; Hypertension in her mother. SHX:  reports that she has never smoked. She has never used smokeless tobacco. She reports previous alcohol use. She reports that she does not use drugs. ROS:  Unable to obtain intubated on ventilator    Hemodynamics:    CO:    CI:    CVP:    SVR:   PAP Systolic:    PAP Diastolic:    PVR:    DB14:        Ventilator Settings:      Mode Rate TV Press PEEP FiO2 PIP Min. Vent   Assist control,Volume control    500 ml    5 cm H20 21 %  23 cm H2O  6.65 l/min        Vital Signs: Telemetry:    normal sinus rhythm Intake/Output:   Visit Vitals  BP (!) 147/58   Pulse 84   Temp 98.7 °F (37.1 °C)   Resp 12   Ht 5' 6.93\" (1.7 m)   Wt 84.6 kg (186 lb 8.2 oz)   SpO2 100%   Breastfeeding No   BMI 29.27 kg/m²       Temp (24hrs), Av.5 °F (36.9 °C), Min:97.7 °F (36.5 °C), Max:99.9 °F (37.7 °C)        O2 Device: Ventilator O2 Flow Rate (L/min): 1 l/min       Wt Readings from Last 4 Encounters:   22 84.6 kg (186 lb 8.2 oz)   04/10/22 86.6 kg (191 lb)   22 82.2 kg (181 lb 3.5 oz)   20 84.4 kg (186 lb)          Intake/Output Summary (Last 24 hours) at 2022 0839  Last data filed at 2022 0636  Gross per 24 hour   Intake 2878.08 ml   Output 2065 ml   Net 813.08 ml       Last shift:      No intake/output data recorded. Last 3 shifts:  1901 -  0700  In: 4454.8 [I.V.:1424.8]  Out: 2320 [Urine:2990; Drains:125]       Physical Exam:     General: intubated on vent unresponsive on propofol  HEENT: NCAT,  Eyes: anicteric; conjunctiva clear doll's eye reflex present  Neck: no nodes, no cuff leak, trach midline; no accessory MM use. Neck veins obscured by obesity but seems slightly engorged  Chest: no deformity,   Cardiac: R regular; no murmur;   Lungs: distant breath sounds; no wheezes rales in the bases  Abd: soft, NT, hypoactive BS  Ext: Ace edema; no joint swelling;  No clubbing  :clear urine  Neuro: Positive on propofol doll's eye reflex present flaccid extremities  Psych-  unable to assess  Skin: warm, dry, no cyanosis;   Pulses: Brachial and radial pulses intact  Capillary: Normal capillary refill      DATA:    MAR reviewed and pertinent medications noted or modified as needed  MEDS:   Current Facility-Administered Medications   Medication    aspirin chewable tablet 81 mg    NOREPINephrine (LEVOPHED) 8 mg in 0.9% NS 250ml infusion    sodium chloride (NS) flush 5-40 mL    sodium chloride (NS) flush 5-40 mL    0.9% sodium chloride infusion 250 mL    furosemide (LASIX) injection 20 mg    Vancomycin - Reminder to please draw level 5/26 @ 0400    sodium chloride (NS) flush 5-40 mL    sodium chloride (NS) flush 5-40 mL    ondansetron (ZOFRAN ODT) tablet 4 mg    Or    ondansetron (ZOFRAN) injection 4 mg    enoxaparin (LOVENOX) injection 40 mg    dextrose 5% infusion    insulin lispro (HUMALOG) injection    glucose chewable tablet 16 g    glucagon (GLUCAGEN) injection 1 mg    dextrose 10% infusion 0-250 mL    hydrALAZINE (APRESOLINE) tablet 12.5 mg    sacubitriL-valsartan (ENTRESTO) 24-26 mg tablet 1 Tablet    metoprolol succinate (TOPROL-XL) XL tablet 25 mg    ampicillin-sulbactam (UNASYN) 3 g in 0.9% sodium chloride (MBP/ADV) 100 mL MBP    dextrose 10% infusion 0-250 mL    insulin glargine (LANTUS) injection 10 Units    propofol (DIPRIVAN) 10 mg/mL infusion    0.9% sodium chloride infusion 250 mL    sodium hypochlorite (HALF STRENGTH DAKIN'S) 0.25% irrigation (bottle)    Vancomycin Pharmacy Dosing    sodium chloride (NS) flush 5-40 mL    acetaminophen (TYLENOL) tablet 650 mg    Or    acetaminophen (TYLENOL) suppository 650 mg    polyethylene glycol (MIRALAX) packet 17 g    ondansetron (ZOFRAN ODT) tablet 4 mg    Or    ondansetron (ZOFRAN) injection 4 mg    famotidine (PEPCID) tablet 20 mg    acidophilus-pectin, citrus tablet 2 Tablet    albuterol-ipratropium (DUO-NEB) 2.5 MG-0.5 MG/3 ML    artificial saliva (MOUTH KOTE) 1 [place]    brimonidine (ALPHAGAN) 0.2 % ophthalmic solution 1 Drop    cholestyramine-aspartame (QUESTRAN LIGHT) packet 4 g    [Held by provider] fenofibrate nanocrystallized (TRICOR) tablet 48 mg    ferrous sulfate tablet 325 mg    gabapentin (NEURONTIN) capsule 300 mg    insulin glargine (LANTUS) injection 50 Units    latanoprost (XALATAN) 0.005 % ophthalmic solution 1 Drop    traMADoL (ULTRAM) tablet 25 mg        Labs:    Recent Labs     22  0247 22  0340 22  0400   WBC 13.5* 13.5* 12.4*   HGB 7.8* 8.3* 8.4*    388 339     Recent Labs     22  02422  0340 22  0430    144 145   K 3.3* 3.7 3.5   * 116* 116*   CO2 18* 22 22   * 126* 155*   BUN 25* 22* 21*   CREA 0.77 0.68 0.53*   CA 8.5 8.5 8.1*   MG 1.4*  --   --    PHOS 2.8  --   --    ALB 1.4* 1.5* 1.4*   ALT 70 90* 109*     Recent Labs     22  0425 22  0330 22  0335   PH 7.39 7.40 7.39   PCO2 29* 29* 33*   PO2 86 88 124*   HCO3 18* 20* 20*   FIO2 21.0 21 30    AC 12  PEEP 5 FiO2 30%   echo ejection fraction 20% mild mitral regurg left atrium 3.5 cm RVSP 35  , 1313, 1361    Lab Results   Component Value Date/Time    Culture result: Heavy  Mixed skin yasmine isolated   2022 06:15 PM    Culture result: Rare Normal respiratory yasmine 2022 02:48 PM    Culture result: Heavy Acinetobacter baumannii complex 05/15/2022 07:15 PM    Culture result: Heavy Diphtheroids 05/15/2022 07:15 PM     Lab Results   Component Value Date/Time    TSH 2.880 2016 10:13 AM        Imagin/22 chest x-ray with ET tube in place hazy accentuated basilar markings with small amount of fluid in the minor fissure  Results from Hospital Encounter encounter on 22    XR CHEST PORT    Narrative  Portable chest, 0825 hours, compared with earlier today at 0224 hours.     Impression  ET tube retracted from previous, with tip now at the level of the  thoracic inlet, 8-9 cm above the jennifer. Stable appearance of right central  line. Stable mild enlargement of cardiopericardial silhouette. No evidence of  pulmonary edema, air space pneumonia, or pleural effusion. No evidence of  pneumothorax. Some structures are partially obscured by overlying monitoring  electrodes. Results from [place] encounter on 05/14/22    CT ABD PELV WO CONT    Narrative  CT ABD PELV WO CONT    Technique: Noncontrasted CT scan of the abdomen and pelvis was performed  utilizing transaxial images obtained from the dome of the diaphragm to the pubic  symphysis. Coronal and sagittal reconstructions were generated. Dose Reduction:  All CT scans at this facility are performed using dose reduction optimization  techniques as appropriate to a performed exam including the following: Automated  exposure control, adjustments of the mA and/or kV according to patient size, or  use of iterative reconstruction technique. Comparison:  2/25/2022. Findings:  Small-moderate pleural effusions are present with bibasilar  atelectasis. Atherosclerotic calcifications are again evident including coronary  artery calcifications. Gastrostomy tube tip in the gastric antrum. The liver, spleen, pancreas, and  adrenal glands are unremarkable for noncontrasted technique. Stable lobulated  kidneys. Negative for hydronephrosis. Gallbladder is unremarkable. No biliary  duct dilatation apparent. The abdominal aorta is normal in caliber. There is no evidence of bowel obstruction. Small volume ascites has developed. The urinary bladder is decompressed by a Bell catheter. The uterus is  unremarkable. The appendix is normal.    There is mild body wall edema. No suspicious lytic or blastic lesion evident. Impression  Third spacing of fluids with small volume ascites, small-moderate  pleural effusions, and mild body wall edema. Bibasilar atelectasis.       5/20 intubated on ventilator we will continue current vent settings patient is supposed to go for surgery because of transaminitis elevated liver enzyme we will wait as per surgeon for sacral wound debridement and diverting loop colostomy off Levophed, improvement in AST and ALT  5/21 continue with the current vent settings ABG acceptable liver enzyme improving awaiting for the surgery  5/22 maintain ventilator as is in anticipation of surgery.   Renal function improved mild congestion on chest x-ray we will give 1 dose Lasix and potassium  5/23 patient is going for surgery today we will leave ventilator as it is INR is 1.5  5/25 patient remained on ventilator intubated doing well, patient received vitamin K fresh frozen plasma schedule for laparoscopic loop colostomy possible today  5/26 no surgery has been plans will start weaning do sedation vacation  5/27 try to do sedation vacation to wean patient blood pressure dropped still on Levophed we will continue to wean discussed with the family at bedside  5/28 remains on ventilator sedated and also on Levophed possible surgery next week  5/29 on ventilator ET tube advisement 1 to 1.5 cm we will repeat chest x-ray ABG acceptable  5/30 continue with current vent setting  Time of care 30 minutes

## 2022-05-30 NOTE — PROGRESS NOTES
Progress Note    Patient: Rambo Jaime MRN: [id number]  SSN: [id number]    YOB: 1945  Age: 78 y.o. Sex: female      Admit Date: 5/14/2022    LOS: 16 days     Subjective:   Patient followed for sacral wound infection with repeat culture growing Acinetobacter and Enterococci. Left foot wound also grew Acinetobacter, now status post left TMA. WBC increased today but CRP and procal decreasing. She is currently on Vancomycin and Unasyn. Patient remains intubated in the ICU. Still on vasopressor support. Apparently scheduled for diversion colostomy this week. Objective:     Vitals:    05/30/22 1000 05/30/22 1100 05/30/22 1116 05/30/22 1200   BP: (!) 145/57 (!) 143/59  (!) 142/63   Pulse: 81 79 85 80   Resp: 17 19 17 15   Temp:  98.6 °F (37 °C)     SpO2: 100% 100% 100% 100%   Weight:       Height:            Intake and Output:  Current Shift: No intake/output data recorded. Last three shifts: 05/28 1901 - 05/30 0700  In: 4454.8 [I.V.:1424.8]  Out: 0911 [Urine:2990; Drains:125]    Physical Exam:    Vitals and nursing note reviewed. Constitutional:       General: She is not in acute distress. Appearance: She is ill-appearing. HENT: ET Tube  Eyes: closed   Cardiovascular:      Rate and Rhythm: Normal rate and regular rhythm. Heart sounds: No murmur heard. Pulmonary: diffuse rhonchi  Abdominal:      General: Bowel sounds are normal.      Palpations: Abdomen is soft. Tenderness: There is no abdominal tenderness. Comments: PEG site unremarkable   Genitourinary:     Comments: Indwelling Bell with clear urine             Musculoskeletal:      Cervical back: Neck supple. Right lower leg: No edema. Left lower leg: No edema. Comments: Left foot surgical wound shows intact incision with nylon sutures, no erythema or drainage   Skin:     Findings: No rash.              Comments: Stage 3 sacral wound with wound vac in place  Neurological: intubated   Psychiatric: intubated      Lab/Data Review:     WBC 13,500  ALT/ /18    Procal 0.24 <0.31 <0.36 < 0.39 <0.43 <0.45 <0.71 <1.02 <1.72 <2.32 <0.59 <0.57  CRP 12.60 <11.00 <13.10 <15.80 <15.00 <13.60 < 13.90 <17.10 <18.00 <18.90 <17.90    Blood cultures (5/14) Coagulase negative Staphylococci  Sacral wound (5/14) Acinetobacter baumannii sensitive to Unasyn, Cefepime, Ceftazidime and Enterococcus faecium resistant to Ampicillin  Sacral wound (5/23) Mixed skin yasmine FINAL  Left great toe (5/15) Acinetobacter baumannii  Sputum culture (5/19) Rare normal respiratory yasmine FINAL    CXR (5/30) Lungs remain clear without infiltrate or effusion. No pulmonary vascular congestion. The heart and mediastinal contours are stable. Bony thorax is intact. Assessment:     Active Problems:    Sacral ulcer ([address] (5/14/2022)    1. Sacral wound infection secondary now to Acinetobacter baumannii and Enterococcus faecium, on Vancomycin and Unasyn, status post excisional wound debridement to the bone, Day #14 Vancomycin and Day #9 Unasyn. 2. Sepsis with persistent leukocytosis, elevated procal and CRP  3. Left foot wound, culture growing Acinetobacter baumannii, status post TMA  4. Possible ischemic hepatitiis with transaminitis following hypotensive episode, resolving  5. Positive blood cultures for Coagulase negative Staphylococci, probable contaminant  6. Hepatitis C antibody positive, resolved (negative HCV viral level)  7. New onset cardiomyopathy    Comment:  WBC remains elevated and CRP increased. CXR unremarkable for pneumonia    Plan:   1. Continue IV Unasyn and Vancomycin (for E. Faecium)  2. In am, repeat CBC, CRP, procal  3. Repeat urinalysis  4.  Repeat wound culture at next vac change        Signed By: Sandrita Rosales MD     May 30, 2022

## 2022-05-30 NOTE — PROGRESS NOTES
1900  Bedside and Verbal shift change report given to Raine (oncoming nurse) by Lorraine Renee (offgoing nurse). Report included the following information SBAR, Kardex, Procedure Summary, Intake/Output, MAR and Recent Results. 0715  Bedside and Verbal shift change report given to Li Villalpando (oncoming nurse) by Irene West (offgoing nurse). Report included the following information SBAR, Kardex, Procedure Summary, Intake/Output, MAR, Recent Results and Cardiac Rhythm Sinus Arrhythmia .

## 2022-05-30 NOTE — PROGRESS NOTES
Vancomycin Dosing Consult  Day #16 of vancomycin therapy  Consult ordered by Dr. Jun Terrazas for this 78 y.o. female for indication of decubitus ulcer infection/sepsis. Antibiotic regimen: Vancomycin + Unasyn    Temp (24hrs), Av.7 °F (37.1 °C), Min:98.1 °F (36.7 °C), Max:99.9 °F (37.7 °C)    Recent Labs     22  0247 22  0340 22  0430 22  0400   WBC 13.5* 13.5*  --  12.4*   CREA 0.77 0.68 0.53*  --    BUN 25* 22* 21*  --      Est CrCl: 70.5 ml/min  Concomitant nephrotoxic drugs: Vasopressors    Cultures:    Wound: Moderate MDR Pseudomonas aeruginosa susceptible to Zerbaxa, aminoglycosides), moderate mixed skin yasmine, moderate mixed enteric yasmine including yeast, final   Blood: CoNS in the anaerobic bottle, final   Wound, ulcer:  Moderate Acinetobacter baumannii, moderate Enterococcus faecium, light >2 organisms (contraindicated), final  5/15 Wound, great toe: Heavy Acinetobacter baumannii complex (Zosyn resistant, susceptible to Unasyn), heavy Diphtheroids, final   Tissue: pending      MRSA Swab: Not ordered, patient already received first dose of vancomycin    Target range: Trough 10-15 mcg/mL    Assessment/Plan:   Vancomycin level resulted 12.4  WBC, CRP and Procal are elevated  Pharmacy will order Vancomycin 1 g x 1 dose to be given at 1500 and schedule random in AM tomorrow  Antimicrobial stop date TBD

## 2022-05-31 NOTE — PROGRESS NOTES
Subjective: Remains intubated and on pressors. Condition unchanged this am.     INPATIENT MEDICATIONS:  Home medications reviewed.     Current Facility-Administered Medications:     0.9% sodium chloride infusion 250 mL, 250 mL, IntraVENous, PRN, Maranda Soto MD    aspirin chewable tablet 81 mg, 81 mg, Per G Tube, DAILY, Israel Soto MD, 81 mg at 05/30/22 0919    NOREPINephrine (LEVOPHED) 8 mg in 0.9% NS 250ml infusion, 0.5-16 mcg/min, IntraVENous, TITRATE, Maranda Soto MD, Paused at 05/29/22 2046    sodium chloride (NS) flush 5-40 mL, 5-40 mL, IntraVENous, Q8H, Mynor Matthew MD, 10 mL at 05/31/22 0512    sodium chloride (NS) flush 5-40 mL, 5-40 mL, IntraVENous, PRN, Diann Fling, Claria Nissen, MD    0.9% sodium chloride infusion 250 mL, 250 mL, IntraVENous, PRN, Mary Bob MD    furosemide (LASIX) injection 20 mg, 20 mg, IntraVENous, Q12H, Mary Bob MD, 20 mg at 05/30/22 2116    sodium chloride (NS) flush 5-40 mL, 5-40 mL, IntraVENous, Q8H, Mary Bob MD, 10 mL at 05/31/22 2946    sodium chloride (NS) flush 5-40 mL, 5-40 mL, IntraVENous, PRN, Mary Bob MD    ondansetron (ZOFRAN ODT) tablet 4 mg, 4 mg, Oral, Q8H PRN **OR** ondansetron (ZOFRAN) injection 4 mg, 4 mg, IntraVENous, Q6H PRN, Mary Bob MD    enoxaparin (LOVENOX) injection 40 mg, 40 mg, SubCUTAneous, DAILY, Mary Bob MD, 40 mg at 05/30/22 0919    dextrose 5% infusion, 30 mL/hr, IntraVENous, CONTINUOUS, Kalpesh Lara MD, Last Rate: 30 mL/hr at 05/30/22 0611, 30 mL/hr at 05/30/22 0611    insulin lispro (HUMALOG) injection, , SubCUTAneous, Q6H, Mary Bob MD, 1 Units at 05/31/22 0620    glucose chewable tablet 16 g, 4 Tablet, Oral, PRN, Mary Bob MD    glucagon [facility]) injection 1 mg, 1 mg, IntraMUSCular, PRN, Mary Bob MD    dextrose 10% infusion 0-250 mL, 0-250 mL, IntraVENous, PRN, Mary Bob MD    hydrALAZINE (APRESOLINE) tablet 12.5 mg, 12.5 mg, Oral, TID, Anil Siddiqui MD, 12.5 mg at 05/30/22 2116    sacubitriL-valsartan (ENTRESTO) 24-26 mg tablet 1 Tablet, 1 Tablet, Oral, Q12H, Jose Bob MD, 1 Tablet at 05/30/22 2116    metoprolol succinate (TOPROL-XL) XL tablet 25 mg, 25 mg, Oral, DAILY, Jose Bob MD, 25 mg at 05/30/22 0919    ampicillin-sulbactam (UNASYN) 3 g in 0.9% sodium chloride (MBP/ADV) 100 mL MBP, 3 g, IntraVENous, Q8H, Jose Bob MD, Last Rate: 200 mL/hr at 05/31/22 0508, 3 g at 05/31/22 0508    dextrose 10% infusion 0-250 mL, 0-250 mL, IntraVENous, PRN, Jose Bob MD, 125 mL at 05/19/22 0537    insulin glargine (LANTUS) injection 10 Units, 10 Units, SubCUTAneous, QHS, Jose Bob MD, 10 Units at 05/30/22 2111    propofol (DIPRIVAN) 10 mg/mL infusion, 0-50 mcg/kg/min, IntraVENous, TITRATE, Jose Bob MD, Last Rate: 9.5 mL/hr at 05/31/22 0355, 20 mcg/kg/min at 05/31/22 0355    0.9% sodium chloride infusion 250 mL, 250 mL, IntraVENous, PRN, Jose Bob MD, Last Rate: 15 mL/hr at 05/22/22 0820, Rate Verify at 05/22/22 0820    sodium hypochlorite (HALF STRENGTH DAKIN'S) 0.25% irrigation (bottle), , Topical, BID, Jose Bob MD, Given at 05/30/22 2117    Vancomycin Pharmacy Dosing, , Other, Rx Dosing/Monitoring, Jose Bob MD    sodium chloride (NS) flush 5-40 mL, 5-40 mL, IntraVENous, Q8H, Jose Bob MD, 10 mL at 05/31/22 1791    acetaminophen (TYLENOL) tablet 650 mg, 650 mg, Oral, Q6H PRN, 650 mg at 05/22/22 0556 **OR** acetaminophen (TYLENOL) suppository 650 mg, 650 mg, Rectal, Q6H PRN, Jose Bob MD, 650 mg at 05/16/22 2223    polyethylene glycol (MIRALAX) packet 17 g, 17 g, Oral, DAILY PRN, Jose Bob MD    ondansetron (ZOFRAN ODT) tablet 4 mg, 4 mg, Oral, Q8H PRN **OR** ondansetron (ZOFRAN) injection 4 mg, 4 mg, IntraVENous, Q6H PRN, Jose Bob MD    famotidine (PEPCID) tablet 20 mg, 20 mg, Oral, BID, Jose Bob MD, 20 mg at 05/30/22 2116    acidophilus-pectin, citrus tablet 2 Tablet, 2 Tablet, Oral, DAILY, Jeanmarie Bob MD, 2 Tablet at 05/30/22 0919    albuterol-ipratropium (DUO-NEB) 2.5 MG-0.5 MG/3 ML, 3 mL, Nebulization, Q6H PRN, Jeanmarie Bob MD    artificial saliva (MOUTH KOTE) 1 Spray, 1 Spray, Oral, TID, Jeanmarie Bob MD, 1 [place] at 05/30/22 2117    brimonidine (ALPHAGAN) 0.2 % ophthalmic solution 1 Drop, 1 Drop, Both Eyes, TID, Jeanmarie Bob MD, 1 Drop at 05/30/22 2117    cholestyramine-aspartame (QUESTRAN LIGHT) packet 4 g, 4 g, Oral, BID WITH MEALS, Jeanmarie Bob 430, MD, 4 g at 05/30/22 1803    [Held by provider] fenofibrate nanocrystallized (TRICOR) tablet 48 mg, 48 mg, Oral, DAILY, Jeanmarie Bob MD, 48 mg at 05/19/22 6822    ferrous sulfate tablet 325 mg, 325 mg, Oral, BID, Jeanmarie Bob 430, MD, 325 mg at 05/30/22 2116    gabapentin (NEURONTIN) capsule 300 mg, 300 mg, Oral, BID, Jeanmarie Bob 430 MD, 300 mg at 05/30/22 2116    insulin glargine (LANTUS) injection 50 Units, 50 Units, SubCUTAneous, DAILY, Jeanmarie Bob MD, 50 Units at 05/30/22 9331    latanoprost (XALATAN) 0.005 % ophthalmic solution 1 Drop, 1 Drop, Right Eye, QPM, Jeanmarie Bob MD, 1 Drop at 05/30/22 1803    traMADoL (ULTRAM) tablet 25 mg, 25 mg, Oral, Q12H PRN, Jeanmarie Bob MD, 25 mg at 05/18/22 0056       REVIEW OF SYSTEMS:  Intubated. Physical Exam:   General: Chronically ill appearing female lying in bed intubated/sedated, NAD  HEENT: Normocephalic, PERRL, no drainage  Neck: Supple, Trachea midline, No JVD  RESP: Coarse globally with diminished lower lobes. + Symmetrical chest movement. 30% FiO2. Intubated/Ventilated. Cardiovascular: RRR no RG. + murmur.   PVS: No rubor, cyanosis, mild pitting BLE edema, Radial, DP, PT pulses equal bilaterally  ABD: obese, soft, NT, Normoactive BS  Derm: +arterial line,+sacral wound vac  :+giraldo catheter  Neuro: Sedated with propofol, open eyes and followed simple command of blinking  PSYCH: No anxiety or agitation    Visit Vitals  BP 138/67   Pulse 87   Temp 98.2 °F (36.8 °C)   Resp 13   Ht 5' 6.93\" (1.7 m)   Wt 84.5 kg (186 lb 4.6 oz)   SpO2 100%   Breastfeeding No   BMI 29.24 kg/m²     Case was discussed with Dr. Cas Teixeira and our impression and recommendations are as follows:     1. New onset cardiomyopathy:    - Echo 5/18/22 showing EF of 20-25% down from 50-55% in 4/2022.    - Hold all HF medications while patient on pressors.   -  Recommend ischemic evaluation once patient is clinically stable. Deferred at this time due to patient's acuity and need for other surgical interventions. - The patient is considered high risk for upcoming procedure. - Discussed the potential cardiac risks involved with surgical interventions with the patient's family and they have requested to proceed with the surgery.      2. Elevated troponin:    - HS troponin peaked at 890, currently 275. -  Hgb 6.7, stable at this time.    - Continue BB. -  Ischemic evaluation pending post- op clinical course.    - No statin given LFTs elevated likely due to shock.       3.   NSVT:   - No reoccurrence.    - Continue beta blocker.   - Maintain lytes.        Thank you for involving us in the care of this patient. Brittani Hancock NP  5/31/2022       Dr. Sukhjinder Drew Cardiologist    [facility]  [address], [address] [facility]  [phone]

## 2022-05-31 NOTE — PROGRESS NOTES
1900  Bedside and Verbal shift change report given to Raine (oncoming nurse) by Jennifer Tyson (offgoing nurse). Report included the following information SBAR, Kardex, Intake/Output and Recent Results. 0600  Per ID MD order, giraldo catheter exchanged and UA obtained off the new giraldo. Sent to lab.    0720  Bedside and Verbal shift change report given to Darin Miranda (oncoming nurse) by Abdulkadir Meza (offgoing nurse). Report included the following information SBAR, Kardex, OR Summary, Procedure Summary, Intake/Output, MAR, Recent Results and Cardiac Rhythm Sinus Arrhythmia with PACs/PVCs.

## 2022-05-31 NOTE — PROGRESS NOTES
General Daily Progress Note          Patient Name:   Oliverio Pablo       YOB: 1945       Age:  78 y.o. 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 Cipro    Admitted again yesterday concerning for worsening of sacral ulcer    During last admission patient was seen by infectious disease and also seen by the vascular surgeon patient had debridement of wound during the last admission    Patient seen by the infectious disease yesterday    Sacral wound infection recent cultures growing Pseudomonas resistant to Zosyn and meropenem    5/17    Patient alert awake not in distress  Patient was seen by the vascular surgeon    Vascular surgery planned for laparoscopic loop colostomy placement and sacral debridement then wound vacuum  Also try to close the wound on the left foot with TMA    5/18    Resting in the bed not in any distress  Blood sugars running high    Seen by infectious disease continue vancomycin and start on Unasyn    5/19    And went to the OR intubated because of elevated LFT and elevated INR  Surgery was canceled    On ventilator 40% O2  Propofol drip    5/20    Patient on ventilator with 30% O2  On propofol drip    Hemoglobin dropped to 6.8    Patient's daughter at the bedside    5/20    Patient on ventilator 30% O2  On propofol drip  Getting PEG tube feeding    5/21    Patient still on ventilator 30% O2  On propofol drip  Liver Function improving    5/22  On ventilator with 30% O2 on propofol drip    5/24  Awaiting tissue culture results.    On ventilator with 30% O2 on propofol drip  Left transmetatarsal amputation and Sacral wound excisional wound debridement 13 x 15 cm to the bone completed yesterday. CXR: Hazy mid and lower lung reticular markings.  Dependent small to moderate volume,  right greater than left pleural effusions  Remarkable labs   WBC: 15.2   Hgb: 8.5   Na; 148  CRP: 13.60   Procal: 0.71      5/25    Patient on ventilator with 30% O2  Seen by the vascular surgeon and plan for surgery today    5/26  Patient on ventilator with 30% O2  Hypotensive on Levophed  On propofol drip    Surgery was canceled yesterday because patient unstable hemodynamically not cleared by the anesthesia    5/27    Patient on ventilator with 30% O2  Hypotensive on Levophed  Getting PEG tube feeding    5/30     Patient on ventilator with 21% O2  On propofol drip  Off pressor    5/31    Patient on ventilator with 21% O2  On propofol drip    Off pressor  Patient have a wound vacuum    Objective:     Visit Vitals  /67   Pulse 87   Temp 98.2 °F (36.8 °C)   Resp 13   Ht 5' 6.93\" (1.7 m)   Wt 84.5 kg (186 lb 4.6 oz)   SpO2 100%   Breastfeeding No   BMI 29.24 kg/m²        Recent Results (from the past 24 hour(s))   GLUCOSE, POC    Collection Time: 05/30/22 11:13 AM   Result Value Ref Range    Glucose (POC) 196 (H) 65 - 117 mg/dL    Performed by [address] [place] [address], POC    Collection Time: 05/30/22  5:05 PM   Result Value Ref Range    Glucose (POC) 203 (H) 65 - 117 mg/dL    Performed by [address] [place] [address], POC    Collection Time: 05/30/22  9:21 PM   Result Value Ref Range    Glucose (POC) 193 (H) 65 - 117 mg/dL    Performed by Zach Obrien    GLUCOSE, POC    Collection Time: 05/31/22 12:09 AM   Result Value Ref Range    Glucose (POC) 210 (H) 65 - 117 mg/dL    Performed by Zach Obrien    CBC WITH AUTOMATED DIFF    Collection Time: 05/31/22  3:15 AM   Result Value Ref Range    WBC 14.3 (H) 3.6 - 11.0 K/uL    RBC 2.43 (L) 3.80 - 5.20 M/uL    HGB 6.7 (L) 11.5 - 16.0 g/dL    HCT 21.3 (L) 35.0 - 47.0 % MCV 87.7 80.0 - 99.0 FL    MCH 27.6 26.0 - 34.0 PG    MCHC 31.5 30.0 - 36.5 g/dL    RDW 17.1 (H) 11.5 - 14.5 %    PLATELET 556 507 - 144 K/uL    MPV 10.8 8.9 - 12.9 FL    NRBC 0.0 0.0  WBC    ABSOLUTE NRBC 0.00 0.00 - 0.01 K/uL    NEUTROPHILS 72 32 - 75 %    LYMPHOCYTES 17 12 - 49 %    MONOCYTES 6 5 - 13 %    EOSINOPHILS 3 0 - 7 %    BASOPHILS 1 0 - 1 %    IMMATURE GRANULOCYTES 1 (H) 0 - 0.5 %    ABS. NEUTROPHILS 10.4 (H) 1.8 - 8.0 K/UL    ABS. LYMPHOCYTES 2.4 0.8 - 3.5 K/UL    ABS. MONOCYTES 0.9 0.0 - 1.0 K/UL    ABS. EOSINOPHILS 0.4 0.0 - 0.4 K/UL    ABS. BASOPHILS 0.1 0.0 - 0.1 K/UL    ABS. IMM. GRANS. 0.1 (H) 0.00 - 0.04 K/UL    DF AUTOMATED     METABOLIC PANEL, COMPREHENSIVE    Collection Time: 05/31/22  3:15 AM   Result Value Ref Range    Sodium 141 136 - 145 mmol/L    Potassium 3.4 (L) 3.5 - 5.1 mmol/L    Chloride 115 (H) 97 - 108 mmol/L    CO2 18 (L) 21 - 32 mmol/L    Anion gap 8 5 - 15 mmol/L    Glucose 150 (H) 65 - 100 mg/dL    BUN 29 (H) 6 - 20 mg/dL    Creatinine 0.80 0.55 - 1.02 mg/dL    BUN/Creatinine ratio 36 (H) 12 - 20      GFR est AA >60 >60 ml/min/1.73m2    GFR est non-AA >60 >60 ml/min/1.73m2    Calcium 7.8 (L) 8.5 - 10.1 mg/dL    Bilirubin, total 0.3 0.2 - 1.0 mg/dL    AST (SGOT) 14 (L) 15 - 37 U/L    ALT (SGPT) 55 12 - 78 U/L    Alk.  phosphatase 88 45 - 117 U/L    Protein, total 6.8 6.4 - 8.2 g/dL    Albumin 1.4 (L) 3.5 - 5.0 g/dL    Globulin 5.4 (H) 2.0 - 4.0 g/dL    A-G Ratio 0.3 (L) 1.1 - 2.2     C REACTIVE PROTEIN, QT    Collection Time: 05/31/22  3:15 AM   Result Value Ref Range    C-Reactive protein 11.40 (H) 0.00 - 0.60 mg/dL   PROCALCITONIN    Collection Time: 05/31/22  3:15 AM   Result Value Ref Range    Procalcitonin 0.26 (H) 0 ng/mL   VANCOMYCIN, TROUGH    Collection Time: 05/31/22  3:15 AM   Result Value Ref Range    Vancomycin,trough 17.7 (H) 5.0 - 10.0 ug/mL   BLOOD GAS, ARTERIAL    Collection Time: 05/31/22  3:40 AM   Result Value Ref Range    pH 7.37 7.35 - 7.45      PCO2 31 (L) 35 - 45 mmHg    PO2 123 (H) 75 - 100 mmHg    O2  >95 %    BICARBONATE 19 (L) 22 - 26 mmol/L    BASE DEFICIT 6.8 (H) 0 - 2 mmol/L    O2 METHOD VENT      FIO2 21.0 %    MODE Assist Control/Volume Control      Tidal volume 500      SET RATE 10      EPAP/CPAP/PEEP 5.0      SITE Right Radial      EVELIN'S TEST PASS     GLUCOSE, POC    Collection Time: 05/31/22  5:30 AM   Result Value Ref Range    Glucose (POC) 197 (H) 65 - 117 mg/dL    Performed by Susan Flores    URINALYSIS W/MICROSCOPIC    Collection Time: 05/31/22  5:56 AM   Result Value Ref Range    Color Yellow/Straw      Appearance Turbid (A) Clear      Specific gravity 1.006 1.003 - 1.030      pH (UA) 5.0 5.0 - 8.0      Protein 30 (A) Negative mg/dL    Glucose Negative Negative mg/dL    Ketone Negative Negative mg/dL    Bilirubin Negative Negative      Blood Moderate (A) Negative      Urobilinogen 0.1 0.1 - 1.0 EU/dL    Nitrites Negative Negative      Leukocyte Esterase Large (A) Negative      WBC >100 (H) 0 - 4 /hpf    RBC  0 - 5 /hpf    Bacteria Negative Negative /hpf    Hyaline cast 0-2 0 - 5 /lpf    Granular cast 2-5 (A) Negative /lpf    Yeast Present (A) Negative       [unfilled]      Review of Systems    Not a good historian e. Physical Exam:      Constitutional: On ventilator  HENT:   Head: Normocephalic and atraumatic. Eyes: Pupils are equal, round, and reactive to light. EOM are normal.   Cardiovascular: Normal rate, regular rhythm and normal heart sounds. Pulmonary/Chest: Breath sounds normal. No wheezes. No rales. Exhibits no tenderness. Abdominal: Soft. Bowel sounds are normal. There is no abdominal tenderness. There is no rebound and no guarding. Musculoskeletal: Normal range of motion. Neurological: On ventilator   skin sacral decubitus ulcer and left foot wound right big toe amputation    XR CHEST PORT   Final Result      XR CHEST PORT   Final Result   No interval change or acute process.       XR CHEST PORT Final Result   ET tube retracted from previous, with tip now at the level of the   thoracic inlet, 8-9 cm above the jennifer. Stable appearance of right central   line. Stable mild enlargement of cardiopericardial silhouette. No evidence of   pulmonary edema, air space pneumonia, or pleural effusion. No evidence of   pneumothorax. Some structures are partially obscured by overlying monitoring   electrodes. XR CHEST PORT   Final Result   Basilar airspace disease, possibly subsegmental atelectasis, stable. XR CHEST PORT   Final Result   Cardiomegaly with vascular congestion. Stable appearance of ET tube. Right central line placed, without radiographic evidence of complication. IR INSERT NON TUNL CVC OVER 5 YRS   Final Result   Successful placement of a triple-lumen central venous catheter. The   catheter is ready for use. XR CHEST PORT   Final Result      XR CHEST PORT   Final Result      XR CHEST PORT   Final Result      CT ABD PELV WO CONT   Final Result   Third spacing of fluids with small volume ascites, small-moderate   pleural effusions, and mild body wall edema. Bibasilar atelectasis. XR CHEST PORT   Final Result   Similar findings compared to single view chest 1 day earlier. XR CHEST PORT   Final Result   Findings/IMPRESSION: Stable appearance of endotracheal tube. Stable mild   enlargement of cardiomediastinal silhouette. Central vascular congestion with   bilateral perihilar and basilar opacities, consistent with edema and/or   pneumonia, right greater than left. Possible right pleural effusion. No   pneumothorax. XR CHEST PORT   Final Result   Bibasilar opacities, possibly increased on the right. XR CHEST PORT   Final Result   No significant change allowing for difference in technique and   positioning. Single portable AP view compared to yesterday. Suboptimal inspiratory film   compromises visualization of the lower lung fields.  Monitoring equipment   overlies the body. The tip of the tube is approximately 3 cm above the jennifer. Mild apparent cardiomegaly. Left heart border and left diaphragm obscured. Hazy airspace opacification both lung bases may be a combination of atelectasis,   pneumonia, or edema. No large effusion. Negative for pneumothorax. XR CHEST PORT   Final Result   No significant change allowing for difference in technique and   positioning. Single portable AP view compared to yesterday. Rotation to the right. Monitoring   equipment overlies the body. Suboptimal inspiratory film compromises   visualization of the lower lung fields. Mild cardiomegaly. The tip of the ET tube is approximately 3 cm above the jennifer. Mild basal interstitial edema. No new consolidation. No pleural effusion or   pneumothorax. XR CHEST PORT   Final Result   1. The endotracheal tube is in satisfactory position. 2.  There has been a partial interval resolution in the pulmonary interstitial   edema pattern. XR CHEST PORT   Final Result   Ill-defined haziness in the mid to lower lung zones suspect for mild   atelectatic changes and/or interstitial fluid or pleural fluid. US ABD LTD   Final Result   1. Question pericholecystic fluid. No identified gallstones or sludge. 2. Right pleural effusion. 3. Increased right renal echotexture for medical renal disease. XR CHEST PORT   Final Result   Intubation. Findings suggesting hydrostatic edema. XR CHEST PORT   Final Result   No evidence of an acute cardiopulmonary process.       IR FLUORO GUIDE PLC CVAD    (Results Pending)   IR US GUIDED VASCULAR ACCESS    (Results Pending)   XR CHEST PORT    (Results Pending)        Recent Results (from the past 24 hour(s))   GLUCOSE, POC    Collection Time: 05/30/22 11:13 AM   Result Value Ref Range    Glucose (POC) 196 (H) 65 - 117 mg/dL    Performed by [address] [place] [address]    Collection Time: 05/30/22  5:05 PM   Result Value Ref Range    Glucose (POC) 203 (H) 65 - 117 mg/dL    Performed by [address], POC    Collection Time: 05/30/22  9:21 PM   Result Value Ref Range    Glucose (POC) 193 (H) 65 - 117 mg/dL    Performed by Perla Jackson    GLUCOSE, POC    Collection Time: 05/31/22 12:09 AM   Result Value Ref Range    Glucose (POC) 210 (H) 65 - 117 mg/dL    Performed by Perla Jackson    CBC WITH AUTOMATED DIFF    Collection Time: 05/31/22  3:15 AM   Result Value Ref Range    WBC 14.3 (H) 3.6 - 11.0 K/uL    RBC 2.43 (L) 3.80 - 5.20 M/uL    HGB 6.7 (L) 11.5 - 16.0 g/dL    HCT 21.3 (L) 35.0 - 47.0 %    MCV 87.7 80.0 - 99.0 FL    MCH 27.6 26.0 - 34.0 PG    MCHC 31.5 30.0 - 36.5 g/dL    RDW 17.1 (H) 11.5 - 14.5 %    PLATELET 583 980 - 606 K/uL    MPV 10.8 8.9 - 12.9 FL    NRBC 0.0 0.0  WBC    ABSOLUTE NRBC 0.00 0.00 - 0.01 K/uL    NEUTROPHILS 72 32 - 75 %    LYMPHOCYTES 17 12 - 49 %    MONOCYTES 6 5 - 13 %    EOSINOPHILS 3 0 - 7 %    BASOPHILS 1 0 - 1 %    IMMATURE GRANULOCYTES 1 (H) 0 - 0.5 %    ABS. NEUTROPHILS 10.4 (H) 1.8 - 8.0 K/UL    ABS. LYMPHOCYTES 2.4 0.8 - 3.5 K/UL    ABS. MONOCYTES 0.9 0.0 - 1.0 K/UL    ABS. EOSINOPHILS 0.4 0.0 - 0.4 K/UL    ABS. BASOPHILS 0.1 0.0 - 0.1 K/UL    ABS. IMM. GRANS. 0.1 (H) 0.00 - 0.04 K/UL    DF AUTOMATED     METABOLIC PANEL, COMPREHENSIVE    Collection Time: 05/31/22  3:15 AM   Result Value Ref Range    Sodium 141 136 - 145 mmol/L    Potassium 3.4 (L) 3.5 - 5.1 mmol/L    Chloride 115 (H) 97 - 108 mmol/L    CO2 18 (L) 21 - 32 mmol/L    Anion gap 8 5 - 15 mmol/L    Glucose 150 (H) 65 - 100 mg/dL    BUN 29 (H) 6 - 20 mg/dL    Creatinine 0.80 0.55 - 1.02 mg/dL    BUN/Creatinine ratio 36 (H) 12 - 20      GFR est AA >60 >60 ml/min/1.73m2    GFR est non-AA >60 >60 ml/min/1.73m2    Calcium 7.8 (L) 8.5 - 10.1 mg/dL    Bilirubin, total 0.3 0.2 - 1.0 mg/dL    AST (SGOT) 14 (L) 15 - 37 U/L    ALT (SGPT) 55 12 - 78 U/L    Alk.  phosphatase 88 45 - 117 U/L    Protein, total 6.8 6.4 - 8.2 g/dL Albumin 1.4 (L) 3.5 - 5.0 g/dL    Globulin 5.4 (H) 2.0 - 4.0 g/dL    A-G Ratio 0.3 (L) 1.1 - 2.2     C REACTIVE PROTEIN, QT    Collection Time: 05/31/22  3:15 AM   Result Value Ref Range    C-Reactive protein 11.40 (H) 0.00 - 0.60 mg/dL   PROCALCITONIN    Collection Time: 05/31/22  3:15 AM   Result Value Ref Range    Procalcitonin 0.26 (H) 0 ng/mL   VANCOMYCIN, TROUGH    Collection Time: 05/31/22  3:15 AM   Result Value Ref Range    Vancomycin,trough 17.7 (H) 5.0 - 10.0 ug/mL   BLOOD GAS, ARTERIAL    Collection Time: 05/31/22  3:40 AM   Result Value Ref Range    pH 7.37 7.35 - 7.45      PCO2 31 (L) 35 - 45 mmHg    PO2 123 (H) 75 - 100 mmHg    O2  >95 %    BICARBONATE 19 (L) 22 - 26 mmol/L    BASE DEFICIT 6.8 (H) 0 - 2 mmol/L    O2 METHOD VENT      FIO2 21.0 %    MODE Assist Control/Volume Control      Tidal volume 500      SET RATE 10      EPAP/CPAP/PEEP 5.0      SITE Right Radial      EVELIN'S TEST PASS     GLUCOSE, POC    Collection Time: 05/31/22  5:30 AM   Result Value Ref Range    Glucose (POC) 197 (H) 65 - 117 mg/dL    Performed by Fracisco Bustillo    URINALYSIS W/MICROSCOPIC    Collection Time: 05/31/22  5:56 AM   Result Value Ref Range    Color Yellow/Straw      Appearance Turbid (A) Clear      Specific gravity 1.006 1.003 - 1.030      pH (UA) 5.0 5.0 - 8.0      Protein 30 (A) Negative mg/dL    Glucose Negative Negative mg/dL    Ketone Negative Negative mg/dL    Bilirubin Negative Negative      Blood Moderate (A) Negative      Urobilinogen 0.1 0.1 - 1.0 EU/dL    Nitrites Negative Negative      Leukocyte Esterase Large (A) Negative      WBC >100 (H) 0 - 4 /hpf    RBC  0 - 5 /hpf    Bacteria Negative Negative /hpf    Hyaline cast 0-2 0 - 5 /lpf    Granular cast 2-5 (A) Negative /lpf    Yeast Present (A) Negative         Results     Procedure Component Value Units Date/Time    CULTURE, Silvano Vasquez [[id number]]     Order Status: Sent Specimen: Sacral Wound     CULTURE, TISSUE W GRAM STAIN [[id number]] Collected: 05/23/22 1815    Order Status: Completed Specimen: Tissue Updated: 05/26/22 1510     Special Requests: No Special Requests        GRAM STAIN Rare WBCs seen         Few Gram Negative Rods        Culture result: Heavy  Mixed skin yasmine isolated       CULTURE, RESPIRATORY/SPUTUM/BRONCH Daralyn Senters STAIN [[id number]] Collected: 05/19/22 1448    Order Status: Completed Specimen: Sputum Updated: 05/21/22 0813     Special Requests: No Special Requests        GRAM STAIN 1+ WBCs seen         no epithelial cells seen         No organisms seen        Culture result:       Rare Normal respiratory yasmine                 Labs:     Recent Labs     05/31/22 0315 05/30/22 0247   WBC 14.3* 13.5*   HGB 6.7* 7.8*   HCT 21.3* 24.5*    374     Recent Labs     05/31/22 0315 05/30/22 0247 05/29/22 0340    140 144   K 3.4* 3.3* 3.7   * 113* 116*   CO2 18* 18* 22   BUN 29* 25* 22*   CREA 0.80 0.77 0.68   * 209* 126*   CA 7.8* 8.5 8.5   MG  --  1.4*  --    PHOS  --  2.8  --      Recent Labs     05/31/22 0315 05/30/22 0247 05/29/22 0340   ALT 55 70 90*   AP 88 91 99   TBILI 0.3 0.3 0.4   TP 6.8 7.0 6.3*   ALB 1.4* 1.4* 1.5*   GLOB 5.4* 5.6* 4.8*     No results for input(s): INR, PTP, APTT, INREXT, INREXT in the last 72 hours. No results for input(s): FE, TIBC, PSAT, FERR in the last 72 hours. No results found for: FOL, RBCF   Recent Labs     05/31/22 0340 05/30/22 0425   PH 7.37 7.39   PCO2 31* 29*   PO2 123* 86     No results for input(s): CPK, CKNDX, TROIQ in the last 72 hours.     No lab exists for component: CPKMB  Lab Results   Component Value Date/Time    Cholesterol, total 124 03/08/2022 07:40 AM    HDL Cholesterol 30 03/08/2022 07:40 AM    LDL,Direct 79 06/28/2021 12:00 AM    LDL, calculated 44.8 03/08/2022 07:40 AM    Triglyceride 202 (H) 05/16/2022 06:20 AM    CHOL/HDL Ratio 4.1 03/08/2022 07:40 AM     Lab Results   Component Value Date/Time    Glucose (POC) 197 (H) 05/31/2022 05:30 AM    Glucose (POC) 210 (H) 05/31/2022 12:09 AM    Glucose (POC) 193 (H) 05/30/2022 09:21 PM    Glucose (POC) 203 (H) 05/30/2022 05:05 PM    Glucose (POC) 196 (H) 05/30/2022 11:13 AM     Lab Results   Component Value Date/Time    Color Yellow/Straw 05/31/2022 05:56 AM    Appearance Turbid (A) 05/31/2022 05:56 AM    Specific gravity 1.006 05/31/2022 05:56 AM    pH (UA) 5.0 05/31/2022 05:56 AM    Protein 30 (A) 05/31/2022 05:56 AM    Glucose Negative 05/31/2022 05:56 AM    Ketone Negative 05/31/2022 05:56 AM    Bilirubin Negative 05/31/2022 05:56 AM    Urobilinogen 0.1 05/31/2022 05:56 AM    Nitrites Negative 05/31/2022 05:56 AM    Leukocyte Esterase Large (A) 05/31/2022 05:56 AM    Bacteria Negative 05/31/2022 05:56 AM    WBC >100 (H) 05/31/2022 05:56 AM    RBC  05/31/2022 05:56 AM         Assessment:   Acute respiratory failure on ventilator 30% O2  Sacral decubitus ulcer postdebridement  Sepsis with leukocytosis elevated procalcitonin and CRP  Left foot wound  Recent removal of left great toe and second toe  CVA with PEG tube placement  History of aspiration pneumonia  History of chronic kidney disease  CAD with ejection fraction about 20 to 25%  Hypertension  Hyperlipidemia  Anemia of chronic disease  Hyperkalemia  Static post transfusion  Uncontrolled diabetes  Hepatic shock/improving  Coagulopathy  Elevated  Troponin  Bacteremia with coagulase-negative Staphylococcus  Procedure:  1. Left transmetatarsal amputation. 2.  Sacral wound excisional wound debridement 13 x 15 cm to the bone. Hypotension on Levophed  Hypokalemia/replace potassium  Plan:     Unasyn 3 g every 8 hours  Aspirin 81 mg daily  Questran 4 g twice a day  Pepcid 20 twice daily  Ferrous sulfate 325 mg twice a day  Lovenox 40 subcu daily  Lasix 20 mg IV every 12 hours  Pepcid 20 twice daily  Ferrous sulfate 325 mg twice a day  Lantus 50 units subcu daily and 10 units in p.m.   Gabapentin 300 mg twice a day  Hydralazine 12.5 mg 3 times a day  Metoprolol 25 daily  Vitamin K 10 mg daily  Replace potassium  Entresto 1 tablet twice a day  Vancomycin with pharmacy protocol  Replace potassium  2 A of sodium bicarb    Repeat  the labs overall prognosis very poor        laparoscopic loop colostomy for fecal diversion canceled    Discussed with vascular surgeon plan for surgery/when cleared by the anesthesia    Continue current medications    Discussed with ICU nurse         Current Facility-Administered Medications:     0.9% sodium chloride infusion 250 mL, 250 mL, IntraVENous, PRN, Jennifer Soto MD    fluconazole (DIFLUCAN) 400mg/200 mL IVPB (premix), 400 mg, IntraVENous, ONCE, Swapna Navarro MD  Sandy Luna  [START ON 6/1/2022] fluconazole (DIFLUCAN) 200mg/100 mL IVPB (premix), 200 mg, IntraVENous, Q24H, Swapna Navarro MD    aspirin chewable tablet 81 mg, 81 mg, Per G Tube, DAILY, Israel Soto MD, 81 mg at 05/31/22 0946    NOREPINephrine (LEVOPHED) 8 mg in 0.9% NS 250ml infusion, 0.5-16 mcg/min, IntraVENous, TITRATE, Jennifer Soto MD, Paused at 05/29/22 2046    sodium chloride (NS) flush 5-40 mL, 5-40 mL, IntraVENous, Q8H, Jess Matthew MD, 10 mL at 05/31/22 0512    sodium chloride (NS) flush 5-40 mL, 5-40 mL, IntraVENous, PRN, Krunal Hayward MD    0.9% sodium chloride infusion 250 mL, 250 mL, IntraVENous, PRN, Jorge Bob MD    furosemide (LASIX) injection 20 mg, 20 mg, IntraVENous, Q12H, Jorge Bob MD, 20 mg at 05/31/22 0946    sodium chloride (NS) flush 5-40 mL, 5-40 mL, IntraVENous, Q8H, Jorge Bob MD, 10 mL at 05/31/22 1108    sodium chloride (NS) flush 5-40 mL, 5-40 mL, IntraVENous, PRN, Paulino, Jorge Godwin MD    ondansetron (ZOFRAN ODT) tablet 4 mg, 4 mg, Oral, Q8H PRN **OR** ondansetron (ZOFRAN) injection 4 mg, 4 mg, IntraVENous, Q6H PRN, Jorge Bob MD    enoxaparin (LOVENOX) injection 40 mg, 40 mg, SubCUTAneous, DAILY, Jorge Bob MD, 40 mg at 05/31/22 0946    dextrose 5% infusion, 30 mL/hr, IntraVENous, CONTINUOUS, Kalpesh Lara MD, Last Rate: 30 mL/hr at 05/30/22 0611, 30 mL/hr at 05/30/22 0611    insulin lispro (HUMALOG) injection, , SubCUTAneous, Q6H, Fernando Bob MD, 1 Units at 05/31/22 0620    glucose chewable tablet 16 g, 4 Tablet, Oral, PRN, Fernando Bob MD    glucagon [facility]) injection 1 mg, 1 mg, IntraMUSCular, PRN, Fernando Bob MD    dextrose 10% infusion 0-250 mL, 0-250 mL, IntraVENous, PRN, Fernando Bob MD    hydrALAZINE (APRESOLINE) tablet 12.5 mg, 12.5 mg, Oral, TID, Fernando Bob MD, 12.5 mg at 05/31/22 0946    sacubitriL-valsartan (ENTRESTO) 24-26 mg tablet 1 Tablet, 1 Tablet, Oral, Q12H, Fernando Bob MD, 1 Tablet at 05/31/22 0946    metoprolol succinate (TOPROL-XL) XL tablet 25 mg, 25 mg, Oral, DAILY, Fernando Bob MD, 25 mg at 05/30/22 0919    ampicillin-sulbactam (UNASYN) 3 g in 0.9% sodium chloride (MBP/ADV) 100 mL MBP, 3 g, IntraVENous, Q8H, Fernando Bob MD, Last Rate: 200 mL/hr at 05/31/22 0508, 3 g at 05/31/22 0508    dextrose 10% infusion 0-250 mL, 0-250 mL, IntraVENous, PRN, Fernando Bob MD, 125 mL at 05/19/22 0537    insulin glargine (LANTUS) injection 10 Units, 10 Units, SubCUTAneous, QHS, Fenrando Bob MD, 10 Units at 05/30/22 2111    propofol (DIPRIVAN) 10 mg/mL infusion, 0-50 mcg/kg/min, IntraVENous, TITRATE, Fernando Bob MD, Last Rate: 9.5 mL/hr at 05/31/22 0355, 20 mcg/kg/min at 05/31/22 0355    0.9% sodium chloride infusion 250 mL, 250 mL, IntraVENous, PRN, Fernando Bob MD, Last Rate: 15 mL/hr at 05/22/22 0820, Rate Verify at 05/22/22 0820    sodium hypochlorite (HALF STRENGTH DAKIN'S) 0.25% irrigation (bottle), , Topical, BID, Fernando Bob MD, Given at 05/31/22 9229    Vancomycin Pharmacy Dosing, , Other, Rx Dosing/Monitoring, Fernando Bob MD    sodium chloride (NS) flush 5-40 mL, 5-40 mL, IntraVENous, Q8H, Fernando Bob MD, 10 mL at 05/31/22 0513    acetaminophen (TYLENOL) tablet 650 mg, 650 mg, Oral, Q6H PRN, 650 mg at 05/22/22 0556 **OR** acetaminophen (TYLENOL) suppository 650 mg, 650 mg, Rectal, Q6H PRN, April Bob MD, 650 mg at 05/16/22 2223    polyethylene glycol (MIRALAX) packet 17 g, 17 g, Oral, DAILY PRN, April Bob MD    ondansetron (ZOFRAN ODT) tablet 4 mg, 4 mg, Oral, Q8H PRN **OR** ondansetron (ZOFRAN) injection 4 mg, 4 mg, IntraVENous, Q6H PRN, April Bob MD    famotidine (PEPCID) tablet 20 mg, 20 mg, Oral, BID, April Bob MD, 20 mg at 05/31/22 7453    acidophilus-pectin, citrus tablet 2 Tablet, 2 Tablet, Oral, DAILY, April Bob MD, 2 Tablet at 05/31/22 0946    albuterol-ipratropium (DUO-NEB) 2.5 MG-0.5 MG/3 ML, 3 mL, Nebulization, Q6H PRN, April Bob MD    artificial saliva (MOUTH KOTE) 1 Spray, 1 Spray, Oral, TID, April Bob MD, 1 [place] at 05/30/22 2117    brimonidine (ALPHAGAN) 0.2 % ophthalmic solution 1 Drop, 1 Drop, Both Eyes, TID, April Bob MD, 1 Drop at 05/31/22 0948    cholestyramine-aspartame (QUESTRAN LIGHT) packet 4 g, 4 g, Oral, BID WITH MEALS, April Bob MD, 4 g at 05/31/22 0946    [Held by provider] fenofibrate nanocrystallized (TRICOR) tablet 48 mg, 48 mg, Oral, DAILY, April Bob MD, 48 mg at 05/19/22 7810    ferrous sulfate tablet 325 mg, 325 mg, Oral, BID, April Bob MD, 325 mg at 05/31/22 0946    gabapentin (NEURONTIN) capsule 300 mg, 300 mg, Oral, BID, April Bob MD, 300 mg at 05/31/22 0946    insulin glargine (LANTUS) injection 50 Units, 50 Units, SubCUTAneous, DAILY, April Bob MD, 50 Units at 05/31/22 09    latanoprost (XALATAN) 0.005 % ophthalmic solution 1 Drop, 1 Drop, Right Eye, QPM, April Bob MD, 1 Drop at 05/30/22 1808    traMADoL (ULTRAM) tablet 25 mg, 25 mg, Oral, Q12H PRN, April Bob MD, 25 mg at 05/18/22 2343

## 2022-05-31 NOTE — PROGRESS NOTES
IMPRESSION:   1. Acute hypoxic respiratory failure remains on the ventilator  2. Sacral decubiti ulcer with Acinetobacter and Enterococcus  3. Severe sepsis with septic shock resolved on no pressors  4. Left foot wound  5. Transaminitis continue to improve  6. Shock liver  7. Hypokalemia repleted  8. Symptomatic anemia stable after transfusion  9. Cardiomyopathy  10. Mild pulmonary edema      RECOMMENDATIONS/PLAN:   1. ICU monitoring  1. Ventilator for mechanical life support and prevent respiratory arrest with protective lung strategies will decrease the rate she is on 21% FiO2  2. Possible surgery next we will keep on ventilator and also she is hemodynamically unstable on Levophed  3. Pending loop colostomy possible surgery which has been postponed till next week  4. ET tube is 3.5 cm above the jennifer will advise 1 cm and repeat chest x-ray which has been corrected  5. Liver enzymes improved   6. Hemoglobin trending down we will continue to monitor  7. Patient underwent on 5/23 left metatarsal amputation and sacral wound debridement  8. Severe sepsis with decubiti ulcer   9. Patient is on Unasyn  10. Chest x-ray shows mild congestive changes with fluid in the minor fissure  11. Anemia hemoglobin improved after transfusion 5/20     [x] High complexity decision making was performed  [x] See my orders for details  HPI   29-year-old lady came in because of leg pain past medical history of hypertension diabetes mellitus peripheral vascular disease CVA she is bedbound she has leg wound and also has sacral decubiti ulcer and she was scheduled for laparoscopic colostomy and sacral wound debridement and amputation of the left tarsometatarsal because of wound infection but her condition got worse her liver enzyme was elevated INR was also elevated she was intubated transferred to ICU was getting vancomycin and Unasyn started on Levophed because of low blood pressure.     PMH:  has a past medical history of Anemia, Chronic kidney disease, Diabetes mellitus ([facility] [address] Hemiplegia affecting dominant side, post-stroke ([facility] [address] (05/04/2022), HTN (hypertension), Hyperkalemia, Hyperlipidemia, Ill-defined condition, Neuropathy, PAD (peripheral artery disease) ([facility] [address] Sciatica, Stroke ([facility] [address] and UTI (urinary tract infection). PSH:   has a past surgical history that includes hx other surgical (Bilateral); hx amputation (Right); hx other surgical; hx cervical fusion; hx vascular stent (Bilateral); hx vascular stent (Bilateral); hx gi; and ir insert non tunl cvc over 5 yrs (5/27/2022). FHX: family history includes Cancer in her mother; Diabetes in her mother; Hypertension in her mother. SHX:  reports that she has never smoked. She has never used smokeless tobacco. She reports previous alcohol use. She reports that she does not use drugs.     ALL: No Known Allergies     MEDS:   [x] Reviewed - As Below   [] Not reviewed    Current Facility-Administered Medications   Medication    0.9% sodium chloride infusion 250 mL    fluconazole (DIFLUCAN) 400mg/200 mL IVPB (premix)    [START ON 6/1/2022] fluconazole (DIFLUCAN) 200mg/100 mL IVPB (premix)    aspirin chewable tablet 81 mg    NOREPINephrine (LEVOPHED) 8 mg in 0.9% NS 250ml infusion    sodium chloride (NS) flush 5-40 mL    sodium chloride (NS) flush 5-40 mL    0.9% sodium chloride infusion 250 mL    furosemide (LASIX) injection 20 mg    sodium chloride (NS) flush 5-40 mL    sodium chloride (NS) flush 5-40 mL    ondansetron (ZOFRAN ODT) tablet 4 mg    Or    ondansetron (ZOFRAN) injection 4 mg    enoxaparin (LOVENOX) injection 40 mg    dextrose 5% infusion    insulin lispro (HUMALOG) injection    glucose chewable tablet 16 g    glucagon (GLUCAGEN) injection 1 mg    dextrose 10% infusion 0-250 mL    hydrALAZINE (APRESOLINE) tablet 12.5 mg    sacubitriL-valsartan (ENTRESTO) 24-26 mg tablet 1 Tablet    metoprolol succinate (TOPROL-XL) XL tablet 25 mg    ampicillin-sulbactam (UNASYN) 3 g in 0.9% sodium chloride (MBP/ADV) 100 mL MBP    dextrose 10% infusion 0-250 mL    insulin glargine (LANTUS) injection 10 Units    propofol (DIPRIVAN) 10 mg/mL infusion    0.9% sodium chloride infusion 250 mL    sodium hypochlorite (HALF STRENGTH DAKIN'S) 0.25% irrigation (bottle)    Vancomycin Pharmacy Dosing    sodium chloride (NS) flush 5-40 mL    acetaminophen (TYLENOL) tablet 650 mg    Or    acetaminophen (TYLENOL) suppository 650 mg    polyethylene glycol (MIRALAX) packet 17 g    ondansetron (ZOFRAN ODT) tablet 4 mg    Or    ondansetron (ZOFRAN) injection 4 mg    famotidine (PEPCID) tablet 20 mg    acidophilus-pectin, citrus tablet 2 Tablet    albuterol-ipratropium (DUO-NEB) 2.5 MG-0.5 MG/3 ML    artificial saliva (MOUTH KOTE) 1 Spray    brimonidine (ALPHAGAN) 0.2 % ophthalmic solution 1 Drop    cholestyramine-aspartame (QUESTRAN LIGHT) packet 4 g    [Held by provider] fenofibrate nanocrystallized (TRICOR) tablet 48 mg    ferrous sulfate tablet 325 mg    gabapentin (NEURONTIN) capsule 300 mg    insulin glargine (LANTUS) injection 50 Units    latanoprost (XALATAN) 0.005 % ophthalmic solution 1 Drop    traMADoL (ULTRAM) tablet 25 mg      MAR reviewed and pertinent medications noted or modified as needed   Current Facility-Administered Medications   Medication    0.9% sodium chloride infusion 250 mL    fluconazole (DIFLUCAN) 400mg/200 mL IVPB (premix)    [START ON 6/1/2022] fluconazole (DIFLUCAN) 200mg/100 mL IVPB (premix)    aspirin chewable tablet 81 mg    NOREPINephrine (LEVOPHED) 8 mg in 0.9% NS 250ml infusion    sodium chloride (NS) flush 5-40 mL    sodium chloride (NS) flush 5-40 mL    0.9% sodium chloride infusion 250 mL    furosemide (LASIX) injection 20 mg    sodium chloride (NS) flush 5-40 mL    sodium chloride (NS) flush 5-40 mL    ondansetron (ZOFRAN ODT) tablet 4 mg    Or    ondansetron (ZOFRAN) injection 4 mg    enoxaparin (LOVENOX) injection 40 mg  dextrose 5% infusion    insulin lispro (HUMALOG) injection    glucose chewable tablet 16 g    glucagon (GLUCAGEN) injection 1 mg    dextrose 10% infusion 0-250 mL    hydrALAZINE (APRESOLINE) tablet 12.5 mg    sacubitriL-valsartan (ENTRESTO) 24-26 mg tablet 1 Tablet    metoprolol succinate (TOPROL-XL) XL tablet 25 mg    ampicillin-sulbactam (UNASYN) 3 g in 0.9% sodium chloride (MBP/ADV) 100 mL MBP    dextrose 10% infusion 0-250 mL    insulin glargine (LANTUS) injection 10 Units    propofol (DIPRIVAN) 10 mg/mL infusion    0.9% sodium chloride infusion 250 mL    sodium hypochlorite (HALF STRENGTH DAKIN'S) 0.25% irrigation (bottle)    Vancomycin Pharmacy Dosing    sodium chloride (NS) flush 5-40 mL    acetaminophen (TYLENOL) tablet 650 mg    Or    acetaminophen (TYLENOL) suppository 650 mg    polyethylene glycol (MIRALAX) packet 17 g    ondansetron (ZOFRAN ODT) tablet 4 mg    Or    ondansetron (ZOFRAN) injection 4 mg    famotidine (PEPCID) tablet 20 mg    acidophilus-pectin, citrus tablet 2 Tablet    albuterol-ipratropium (DUO-NEB) 2.5 MG-0.5 MG/3 ML    artificial saliva (MOUTH KOTE) 1 Spray    brimonidine (ALPHAGAN) 0.2 % ophthalmic solution 1 Drop    cholestyramine-aspartame (QUESTRAN LIGHT) packet 4 g    [Held by provider] fenofibrate nanocrystallized (TRICOR) tablet 48 mg    ferrous sulfate tablet 325 mg    gabapentin (NEURONTIN) capsule 300 mg    insulin glargine (LANTUS) injection 50 Units    latanoprost (XALATAN) 0.005 % ophthalmic solution 1 Drop    traMADoL (ULTRAM) tablet 25 mg      PMH:  has a past medical history of Anemia, Chronic kidney disease, Diabetes mellitus ([address] Hemiplegia affecting dominant side, post-stroke ([address] (05/04/2022), HTN (hypertension), Hyperkalemia, Hyperlipidemia, Ill-defined condition, Neuropathy, PAD (peripheral artery disease) ([address] Sciatica, Stroke ([address] and UTI (urinary tract infection).   PSH:   has a past surgical history that includes hx other surgical (Bilateral); hx amputation (Right); hx other surgical; hx cervical fusion; hx vascular stent (Bilateral); hx vascular stent (Bilateral); hx gi; and ir insert non tunl cvc over 5 yrs (2022). FHX: family history includes Cancer in her mother; Diabetes in her mother; Hypertension in her mother. SHX:  reports that she has never smoked. She has never used smokeless tobacco. She reports previous alcohol use. She reports that she does not use drugs. ROS:  Unable to obtain intubated on ventilator    Hemodynamics:    CO:    CI:    CVP:    SVR:   PAP Systolic:    PAP Diastolic:    PVR:    VH91:        Ventilator Settings:      Mode Rate TV Press PEEP FiO2 PIP Min. Vent   Assist control,Volume control    500 ml    5 cm H20 21 %  27 cm H2O  9.81 l/min        Vital Signs: Telemetry:    normal sinus rhythm Intake/Output:   Visit Vitals  /67   Pulse 87   Temp 98.2 °F (36.8 °C)   Resp 13   Ht 5' 6.93\" (1.7 m)   Wt 84.5 kg (186 lb 4.6 oz)   SpO2 100%   Breastfeeding No   BMI 29.24 kg/m²       Temp (24hrs), Av.4 °F (36.9 °C), Min:98 °F (36.7 °C), Max:98.8 °F (37.1 °C)        O2 Device: Ventilator O2 Flow Rate (L/min): 1 l/min       Wt Readings from Last 4 Encounters:   22 84.5 kg (186 lb 4.6 oz)   04/10/22 86.6 kg (191 lb)   22 82.2 kg (181 lb 3.5 oz)   20 84.4 kg (186 lb)          Intake/Output Summary (Last 24 hours) at 2022 1005  Last data filed at 2022 0959  Gross per 24 hour   Intake 2575.24 ml   Output 2575 ml   Net 0.24 ml       Last shift:      701 - 1900  In: 145   Out: -   Last 3 shifts: 1901 -  07  In: 4431.8 [I.V.:2211.8]  Out: 3745 [Urine:2690; Drains:500]       Physical Exam:     General: intubated on vent unresponsive on propofol  HEENT: NCAT,  Eyes: anicteric; conjunctiva clear doll's eye reflex present  Neck: no nodes, no cuff leak, trach midline; no accessory MM use.   Neck veins obscured by obesity but seems slightly engorged  Chest: no deformity,   Cardiac: R regular; no murmur;   Lungs: distant breath sounds; no wheezes rales in the bases  Abd: soft, NT, hypoactive BS  Ext: Ace edema; no joint swelling;  No clubbing  :clear urine  Neuro: Positive on propofol doll's eye reflex present flaccid extremities  Psych-  unable to assess  Skin: warm, dry, no cyanosis;   Pulses: Brachial and radial pulses intact  Capillary: Normal capillary refill      DATA:    MAR reviewed and pertinent medications noted or modified as needed  MEDS:   Current Facility-Administered Medications   Medication    0.9% sodium chloride infusion 250 mL    fluconazole (DIFLUCAN) 400mg/200 mL IVPB (premix)    [START ON 6/1/2022] fluconazole (DIFLUCAN) 200mg/100 mL IVPB (premix)    aspirin chewable tablet 81 mg    NOREPINephrine (LEVOPHED) 8 mg in 0.9% NS 250ml infusion    sodium chloride (NS) flush 5-40 mL    sodium chloride (NS) flush 5-40 mL    0.9% sodium chloride infusion 250 mL    furosemide (LASIX) injection 20 mg    sodium chloride (NS) flush 5-40 mL    sodium chloride (NS) flush 5-40 mL    ondansetron (ZOFRAN ODT) tablet 4 mg    Or    ondansetron (ZOFRAN) injection 4 mg    enoxaparin (LOVENOX) injection 40 mg    dextrose 5% infusion    insulin lispro (HUMALOG) injection    glucose chewable tablet 16 g    glucagon (GLUCAGEN) injection 1 mg    dextrose 10% infusion 0-250 mL    hydrALAZINE (APRESOLINE) tablet 12.5 mg    sacubitriL-valsartan (ENTRESTO) 24-26 mg tablet 1 Tablet    metoprolol succinate (TOPROL-XL) XL tablet 25 mg    ampicillin-sulbactam (UNASYN) 3 g in 0.9% sodium chloride (MBP/ADV) 100 mL MBP    dextrose 10% infusion 0-250 mL    insulin glargine (LANTUS) injection 10 Units    propofol (DIPRIVAN) 10 mg/mL infusion    0.9% sodium chloride infusion 250 mL    sodium hypochlorite (HALF STRENGTH DAKIN'S) 0.25% irrigation (bottle)    Vancomycin Pharmacy Dosing    sodium chloride (NS) flush 5-40 mL    acetaminophen (TYLENOL) tablet 650 mg    Or    acetaminophen (TYLENOL) suppository 650 mg    polyethylene glycol (MIRALAX) packet 17 g    ondansetron (ZOFRAN ODT) tablet 4 mg    Or    ondansetron (ZOFRAN) injection 4 mg    famotidine (PEPCID) tablet 20 mg    acidophilus-pectin, citrus tablet 2 Tablet    albuterol-ipratropium (DUO-NEB) 2.5 MG-0.5 MG/3 ML    artificial saliva (MOUTH KOTE) 1 Spray    brimonidine (ALPHAGAN) 0.2 % ophthalmic solution 1 Drop    cholestyramine-aspartame (QUESTRAN LIGHT) packet 4 g    [Held by provider] fenofibrate nanocrystallized (TRICOR) tablet 48 mg    ferrous sulfate tablet 325 mg    gabapentin (NEURONTIN) capsule 300 mg    insulin glargine (LANTUS) injection 50 Units    latanoprost (XALATAN) 0.005 % ophthalmic solution 1 Drop    traMADoL (ULTRAM) tablet 25 mg        Labs:    Recent Labs     05/31/22 0315 05/30/22  0247 05/29/22  0340   WBC 14.3* 13.5* 13.5*   HGB 6.7* 7.8* 8.3*    374 388     Recent Labs     05/31/22 0315 05/30/22  0247 05/29/22  0340    140 144   K 3.4* 3.3* 3.7   * 113* 116*   CO2 18* 18* 22   * 209* 126*   BUN 29* 25* 22*   CREA 0.80 0.77 0.68   CA 7.8* 8.5 8.5   MG  --  1.4*  --    PHOS  --  2.8  --    ALB 1.4* 1.4* 1.5*   ALT 55 70 90*     Recent Labs     05/31/22  0340 05/30/22  0425 05/29/22  0330   PH 7.37 7.39 7.40   PCO2 31* 29* 29*   PO2 123* 86 88   HCO3 19* 18* 20*   FIO2 21.0 21.0 21   5/22 AC 12  PEEP 5 FiO2 30%  5/18 echo ejection fraction 20% mild mitral regurg left atrium 3.5 cm RVSP 35  , 1313, 1361    Lab Results   Component Value Date/Time    Culture result: Heavy  Mixed skin yasmine isolated   05/23/2022 06:15 PM    Culture result: Rare Normal respiratory yasmine 05/19/2022 02:48 PM    Culture result: Heavy Acinetobacter baumannii complex 05/15/2022 07:15 PM    Culture result: Heavy Diphtheroids 05/15/2022 07:15 PM     Lab Results   Component Value Date/Time    TSH 2.880 12/12/2016 10:13 AM Imagin/22 chest x-ray with ET tube in place hazy accentuated basilar markings with small amount of fluid in the minor fissure  Results from Hospital Encounter encounter on 22    XR CHEST PORT    Narrative  Chest single view. Comparison single view chest May 30, 2022. Unchanged right neck CVC. High position ET tube similar compared to prior imaging. Similar appearance  lungs. Cardiac and mediastinal structures unchanged. No pneumothorax or pleural  effusion. Results from [place] encounter on 22    CT ABD PELV WO CONT    Narrative  CT ABD PELV WO CONT    Technique: Noncontrasted CT scan of the abdomen and pelvis was performed  utilizing transaxial images obtained from the dome of the diaphragm to the pubic  symphysis. Coronal and sagittal reconstructions were generated. Dose Reduction:  All CT scans at this facility are performed using dose reduction optimization  techniques as appropriate to a performed exam including the following: Automated  exposure control, adjustments of the mA and/or kV according to patient size, or  use of iterative reconstruction technique. Comparison:  2022. Findings:  Small-moderate pleural effusions are present with bibasilar  atelectasis. Atherosclerotic calcifications are again evident including coronary  artery calcifications. Gastrostomy tube tip in the gastric antrum. The liver, spleen, pancreas, and  adrenal glands are unremarkable for noncontrasted technique. Stable lobulated  kidneys. Negative for hydronephrosis. Gallbladder is unremarkable. No biliary  duct dilatation apparent. The abdominal aorta is normal in caliber. There is no evidence of bowel obstruction. Small volume ascites has developed. The urinary bladder is decompressed by a Bell catheter. The uterus is  unremarkable. The appendix is normal.    There is mild body wall edema. No suspicious lytic or blastic lesion evident.     Impression  Third spacing of fluids with small volume ascites, small-moderate  pleural effusions, and mild body wall edema. Bibasilar atelectasis. 5/20 intubated on ventilator we will continue current vent settings patient is supposed to go for surgery because of transaminitis elevated liver enzyme we will wait as per surgeon for sacral wound debridement and diverting loop colostomy off Levophed, improvement in AST and ALT  5/21 continue with the current vent settings ABG acceptable liver enzyme improving awaiting for the surgery  5/22 maintain ventilator as is in anticipation of surgery.   Renal function improved mild congestion on chest x-ray we will give 1 dose Lasix and potassium  5/23 patient is going for surgery today we will leave ventilator as it is INR is 1.5  5/25 patient remained on ventilator intubated doing well, patient received vitamin K fresh frozen plasma schedule for laparoscopic loop colostomy possible today  5/26 no surgery has been plans will start weaning do sedation vacation  5/27 try to do sedation vacation to wean patient blood pressure dropped still on Levophed we will continue to wean discussed with the family at bedside  5/28 remains on ventilator sedated and also on Levophed possible surgery next week  5/29 on ventilator ET tube advisement 1 to 1.5 cm we will repeat chest x-ray ABG acceptable  5/30 continue with current vent setting  5/31 awaiting for possible surgery if not we will start weaning  Time of care 30 minutes

## 2022-05-31 NOTE — PROGRESS NOTES
Progress Note    Patient: Emelia Barker MRN: [id number]  SSN: [id number]    YOB: 1945  Age: 78 y.o. Sex: female      Admit Date: 5/14/2022    LOS: 17 days     Subjective:   Patient followed for sacral wound infection with repeat culture growing Acinetobacter and Enterococci. Left foot wound also grew Acinetobacter, now status post left TMA. WBC increased today but CRP and procal decreasing. Urinalysis showing marked pyuria and yeasts. She is currently on Vancomycin and Unasyn. Patient remains intubated in the ICU. Still on vasopressor support. Apparently scheduled for diversion colostomy this week. Objective:     Vitals:    05/31/22 0601 05/31/22 0619 05/31/22 0700 05/31/22 0835   BP: 138/67      Pulse: 85   87   Resp: 15   13   Temp:   98.2 °F (36.8 °C)    SpO2: 100%   100%   Weight:  186 lb 4.6 oz (84.5 kg)     Height:            Intake and Output:  Current Shift: No intake/output data recorded. Last three shifts: 05/29 1901 - 05/31 0700  In: 4431.8 [I.V.:2211.8]  Out: 3485 [Urine:2690; Drains:500]    Physical Exam:    Vitals and nursing note reviewed. Constitutional:       General: She is not in acute distress. Appearance: She is ill-appearing. HENT: ET Tube  Eyes: closed   Cardiovascular:      Rate and Rhythm: Normal rate and regular rhythm. Heart sounds: No murmur heard. Pulmonary: diffuse rhonchi  Abdominal:      General: Bowel sounds are normal.      Palpations: Abdomen is soft. Tenderness: There is no abdominal tenderness. Comments: PEG site unremarkable   Genitourinary:     Comments: Indwelling Bell with cloudy urine             Musculoskeletal:      Cervical back: Neck supple. Right lower leg: No edema. Left lower leg: No edema. Comments: Left foot surgical wound shows intact incision with nylon sutures, no erythema or drainage   Skin:     Findings: No rash.              Comments: Sacral wound stool soiled with displaced wound vac sponge  Neurological: intubated   Psychiatric:  intubated      Lab/Data Review:     WBC 14,300  ALT/AST 55 /14  Urinalysis with WBC >100, Yeasts, Bacteria negative    Procal 0.26 <0.24 <0.31 <0.36 < 0.39 <0.43 <0.45 <0.71 <1.02 <1.72 <2.32 <0.59 <0.57  CRP 11.40 <12.60 <11.00 <13.10 <15.80 <15.00 <13.60 < 13.90 <17.10 <18.00 <18.90 <17.90    Blood cultures (5/14) Coagulase negative Staphylococci  Sacral wound (5/14) Acinetobacter baumannii sensitive to Unasyn, Cefepime, Ceftazidime and Enterococcus faecium resistant to Ampicillin  Sacral wound (5/23) Mixed skin yasmine FINAL  Left great toe (5/15) Acinetobacter baumannii  Sputum culture (5/19) Rare normal respiratory yasmine FINAL    CXR (5/31)  High position ET tube similar compared to prior imaging. Similar appearance  lungs. Cardiac and mediastinal structures unchanged. No pneumothorax or pleural effusion. Assessment:     Active Problems:    Sacral ulcer ([address] (5/14/2022)    1. Sacral wound infection secondary now to Acinetobacter baumannii and Enterococcus faecium, on Vancomycin and Unasyn, status post excisional wound debridement to the bone, Day #14 Vancomycin and Day #9 Unasyn. 2. Sepsis with persistent leukocytosis, elevated procal and CRP  3. Left foot wound, culture growing Acinetobacter baumannii, status post TMA  4. Possible ischemic hepatitiis with transaminitis following hypotensive episode, resolving  5. Positive blood cultures for Coagulase negative Staphylococci, probable contaminant  6. Hepatitis C antibody positive, resolved (negative HCV viral level)  7. New onset cardiomyopathy  8. Candida UTI with marked pyuria and yeasts    Comment:  Candida UTI may explain persistent leukocytosis and elevated procal and CRP. That being said, I am also concerned with today's soiling of her sacral wound despite wound vac. Plan:   1. Continue IV Unasyn and Vancomycin (for E. Faecium)  2. Start Fluconazole 400 mg IV once, then 200 mg IV daily for 14 days  3. Still repeat wound culture at next vac change        Signed By: Merline Cue, MD     May 31, 2022

## 2022-05-31 NOTE — WOUND CARE
Negative Pressure    NAME:  Lenin Patel  YOB: 1945  MEDICAL RECORD NUMBER:  [id number]  DATE:  5/14/2022     Applied Negative Pressure to surgical incision to sacrum   [x] Applied skin barrier prep to leah-wound.  [x] Cut strips of plastic drape to picture frame wound so that leah-wound is     covered with the drape.  [x] If bridging dressing to less prominent site, cover any intact skin that will come in contact with the Negative Pressure Therapy sponge, gauze or channel drain with plastic drape. The sponge should never touch intact skin.  [x] Cut sponge, gauze or channel drain to size which will fit into the wound/ulcer bed without being forced.  [x] Be sure the sponge is large enough to hold the entire round plastic flange which is attached to the tubing. Never allow flange to be larger than the sponge or it will produce suction damaging intact skin.  Total number of individual pieces of foam used within the wound bed: 1 x honeycomb contact layer; 2 x cover layer   [x] If bridging the dressing away from the primary site, be sure the bridge leads to a piece of sponge large enough to hold the entire flange without allowing any of the flange to overlap onto intact skin.  [x] Covered sponge, gauze or channel drain with plastic drape.  [x] Cut a hole in this plastic drape directly over the sponge the same size as the plastic drain tubing.  [x] Removed plastic liner from flange and apply it directly over the hole you cut.  [x] Removed the plastic cover from the flange.  [x] Attached the tubing to the wound/ulcer Negative Pressure Therapy and turn it on to be sure a vacuum is created and that there are no leaks.  [x] If air leaks occur, use plastic drape to patch them.  [] Secured Negative Pressure Therapy dressing with ace wrap loosely if located on an extremity. Maintain tubing outside of ace wrap. Tubing must not exert pressure on intact skin.     Applied per  Guidelines  Removed soiled dressing and irrigated wound with NS. Bone exposure evident at coccyx. Devitalized tissue covering majority of wound bed. Applied track pad directly to grey foam over wound. Applied Optifoam from left periwound to left anterior thigh and secured tubing over foam to prevent skin related injuries. Stoma paste and Exuderm were used to achieve seal at inferior aspect of periwound. Covered dressing with Optifoam dressing to protect from stool. 12 mL of NS will soak every 2 hours for 10 mins. NPWT will resume for remaining time. Change cannister before it fills completely and ensure NS bag is changed out before completely empty. Next dressing change will be for Friday 6/3/22 by [facility]. If wound vac is not working properly: 1) Check to see if track pad tubing is clogged. If so, reposition tubing and ensure tubing for spiking NS  bag is not compressed. 2) If film is leaking, find area with leak and reinforce with film. 3) If leak cannot be fixed, remove dressing, apply wet/dry dressing, and inform the [facility]. Media Information             Document Information    Photographic Image:  Mobile Media Capture   [place]   05/31/2022 15:41   Attached To:    Hospital Encounter on 5/14/22     Source Information    My Swartz RN  [facility]       Electronically signed by Alejandro Kilpatrick RN on 5/31/2022 at 3:47 PM

## 2022-05-31 NOTE — ANESTHESIA PREPROCEDURE EVALUATION
Relevant Problems   NEUROLOGY   (+) CVA (cerebral vascular accident) ([facility]      CARDIOVASCULAR   (+) HTN (hypertension)   (+) PAD (peripheral artery disease) (HCC)      RENAL FAILURE   (+) RICARDO (acute kidney injury) ([facility]      ENDOCRINE   (+) DM type 2 causing vascular disease (HCC)   (+) Type 2 diabetes mellitus with nephropathy (HCC)      HEMATOLOGY   (+) Acute osteomyelitis of ankle or foot (HCC)       Anesthetic History   No history of anesthetic complications            Review of Systems / Medical History  Patient summary reviewed, nursing notes reviewed and pertinent labs reviewed    Pulmonary  Within defined limits                 Neuro/Psych     seizures  CVA (Hemiplegia. )  TIA    Comments: Metabolic Encephalopathy  Peripheral Neuropathy. Cardiovascular    Hypertension          Past MI, CAD, PAD (s/p lower extremity stents. ) and hyperlipidemia      Comments:   ISCHEMIA OF B/L LOWER EXTREMITIES.     GI/Hepatic/Renal       Hepatitis: type C  Renal disease: CRI       Endo/Other    Diabetes: type 2, using insulin    Blood dyscrasia and anemia (RBCs x 1 unint being transfused)    Comments: SEPSIS Other Findings   Comments: Plavix: last dose ?      05/25 1115     PT 17.8 High         INR 1.5 High                     Past Medical History:   Diagnosis Date    Anemia     Chronic kidney disease     Diabetes mellitus ([facility]     Hemiplegia affecting dominant side, post-stroke ([facility] 05/04/2022    HTN (hypertension)     Hyperkalemia     Hyperlipidemia     Ill-defined condition     Neuropathy     [facility]     PCI    Sciatica     Stroke ([facility]     UTI (urinary tract infection)        Past Surgical History:   Procedure Laterality Date    HX AMPUTATION Right     great toe    HX CERVICAL FUSION      HX GI      HX OTHER SURGICAL Bilateral     Stent placement    HX OTHER SURGICAL      HX VASCULAR STENT Bilateral     2 in right 1 in left    HX VASCULAR STENT Bilateral     IR INSERT NON TUNL CVC OVER 5 YRS  5/27/2022       Current Outpatient Medications   Medication Instructions    acidophilus-pectin, citrus 25 million cell -100 mg tab tablet 2 Tablets, Oral, DAILY    albuterol-ipratropium (DUO-NEB) 2.5 mg-0.5 mg/3 ml nebu 3 mL, Nebulization, EVERY 6 HOURS AS NEEDED    amLODIPine (NORVASC) 5 mg, Oral, DAILY    artificial saliva (MOUTH KOTE) spra 1 Spray, Oral, 3 TIMES DAILY    aspirin delayed-release 81 mg tablet Oral, DAILY    atorvastatin (LIPITOR) 20 mg tablet TAKE 1 TABLET BY MOUTH EVERY NIGHT    brimonidine (ALPHAGAN) 0.2 % ophthalmic solution 1 Drop, Both Eyes, 3 TIMES DAILY    cholestyramine-aspartame (QUESTRAN LIGHT) 4 gram packet 4 g, Oral, 2 TIMES DAILY WITH MEALS    fenofibrate nanocrystallized (TRICOR) 48 mg, Oral    ferrous sulfate 325 mg, Oral, 2 TIMES DAILY    gabapentin (NEURONTIN) 300 mg, Oral, 2 TIMES DAILY    hydrALAZINE (APRESOLINE) 50 mg, Oral, 3 TIMES DAILY    insulin glargine (LANTUS) 50 Units, SubCUTAneous, DAILY    latanoprost (XALATAN) 0.005 % ophthalmic solution 1 Drop, Right Eye, EVERY EVENING    metoprolol tartrate (LOPRESSOR) 50 mg, Oral, 2 TIMES DAILY    traMADoL (ULTRAM) 25 mg, Oral, EVERY 12 HOURS AS NEEDED       No current facility-administered medications for this visit. No current outpatient medications on file.      Facility-Administered Medications Ordered in Other Visits   Medication Dose Route Frequency    0.9% sodium chloride infusion 250 mL  250 mL IntraVENous PRN    [START ON 6/1/2022] fluconazole (DIFLUCAN) 200mg/100 mL IVPB (premix)  200 mg IntraVENous Q24H    [START ON 6/1/2022] Vancomycin random level 6/1 at 04:00   Other ONCE    aspirin chewable tablet 81 mg  81 mg Per G Tube DAILY    NOREPINephrine (LEVOPHED) 8 mg in 0.9% NS 250ml infusion  0.5-16 mcg/min IntraVENous TITRATE    sodium chloride (NS) flush 5-40 mL  5-40 mL IntraVENous Q8H    sodium chloride (NS) flush 5-40 mL  5-40 mL IntraVENous PRN    0.9% sodium chloride infusion 250 mL  250 mL IntraVENous PRN    furosemide (LASIX) injection 20 mg  20 mg IntraVENous Q12H    sodium chloride (NS) flush 5-40 mL  5-40 mL IntraVENous Q8H    sodium chloride (NS) flush 5-40 mL  5-40 mL IntraVENous PRN    ondansetron (ZOFRAN ODT) tablet 4 mg  4 mg Oral Q8H PRN    Or    ondansetron (ZOFRAN) injection 4 mg  4 mg IntraVENous Q6H PRN    enoxaparin (LOVENOX) injection 40 mg  40 mg SubCUTAneous DAILY    dextrose 5% infusion  30 mL/hr IntraVENous CONTINUOUS    insulin lispro (HUMALOG) injection   SubCUTAneous Q6H    glucose chewable tablet 16 g  4 Tablet Oral PRN    glucagon (GLUCAGEN) injection 1 mg  1 mg IntraMUSCular PRN    dextrose 10% infusion 0-250 mL  0-250 mL IntraVENous PRN    hydrALAZINE (APRESOLINE) tablet 12.5 mg  12.5 mg Oral TID    sacubitriL-valsartan (ENTRESTO) 24-26 mg tablet 1 Tablet  1 Tablet Oral Q12H    metoprolol succinate (TOPROL-XL) XL tablet 25 mg  25 mg Oral DAILY    ampicillin-sulbactam (UNASYN) 3 g in 0.9% sodium chloride (MBP/ADV) 100 mL MBP  3 g IntraVENous Q8H    dextrose 10% infusion 0-250 mL  0-250 mL IntraVENous PRN    insulin glargine (LANTUS) injection 10 Units  10 Units SubCUTAneous QHS    propofol (DIPRIVAN) 10 mg/mL infusion  0-50 mcg/kg/min IntraVENous TITRATE    0.9% sodium chloride infusion 250 mL  250 mL IntraVENous PRN    sodium hypochlorite (HALF STRENGTH DAKIN'S) 0.25% irrigation (bottle)   Topical BID    Vancomycin Pharmacy Dosing   Other Rx Dosing/Monitoring    sodium chloride (NS) flush 5-40 mL  5-40 mL IntraVENous Q8H    acetaminophen (TYLENOL) tablet 650 mg  650 mg Oral Q6H PRN    Or    acetaminophen (TYLENOL) suppository 650 mg  650 mg Rectal Q6H PRN    polyethylene glycol (MIRALAX) packet 17 g  17 g Oral DAILY PRN    ondansetron (ZOFRAN ODT) tablet 4 mg  4 mg Oral Q8H PRN    Or    ondansetron (ZOFRAN) injection 4 mg  4 mg IntraVENous Q6H PRN    famotidine (PEPCID) tablet 20 mg  20 mg Oral BID    acidophilus-pectin, citrus tablet 2 Tablet  2 Tablet Oral DAILY    albuterol-ipratropium (DUO-NEB) 2.5 MG-0.5 MG/3 ML  3 mL Nebulization Q6H PRN    artificial saliva (MOUTH KOTE) 1 Spray  1 Spray Oral TID    brimonidine (ALPHAGAN) 0.2 % ophthalmic solution 1 Drop  1 Drop Both Eyes TID    cholestyramine-aspartame (QUESTRAN LIGHT) packet 4 g  4 g Oral BID WITH MEALS    [Held by provider] fenofibrate nanocrystallized (TRICOR) tablet 48 mg  48 mg Oral DAILY    ferrous sulfate tablet 325 mg  325 mg Oral BID    gabapentin (NEURONTIN) capsule 300 mg  300 mg Oral BID    insulin glargine (LANTUS) injection 50 Units  50 Units SubCUTAneous DAILY    latanoprost (XALATAN) 0.005 % ophthalmic solution 1 Drop  1 Drop Right Eye QPM    traMADoL (ULTRAM) tablet 25 mg  25 mg Oral Q12H PRN       No data found.     Lab Results   Component Value Date/Time    WBC 14.3 (H) 05/31/2022 03:15 AM    HGB 6.7 (L) 05/31/2022 03:15 AM    HCT 21.3 (L) 05/31/2022 03:15 AM    PLATELET 072 27/33/6620 03:15 AM    MCV 87.7 05/31/2022 03:15 AM     Lab Results   Component Value Date/Time    Sodium 141 05/31/2022 03:15 AM    Potassium 3.4 (L) 05/31/2022 03:15 AM    Chloride 115 (H) 05/31/2022 03:15 AM    CO2 18 (L) 05/31/2022 03:15 AM    Anion gap 8 05/31/2022 03:15 AM    Glucose 150 (H) 05/31/2022 03:15 AM    BUN 29 (H) 05/31/2022 03:15 AM    Creatinine 0.80 05/31/2022 03:15 AM    BUN/Creatinine ratio 36 (H) 05/31/2022 03:15 AM    GFR est AA >60 05/31/2022 03:15 AM    GFR est non-AA >60 05/31/2022 03:15 AM    Calcium 7.8 (L) 05/31/2022 03:15 AM     No results found for: APTT, PTP, INR, INREXT, INREXT  Lab Results   Component Value Date/Time    Glucose 150 (H) 05/31/2022 03:15 AM    Glucose (POC) 221 (H) 05/31/2022 11:16 AM     Physical Exam    Airway          Intubated   Cardiovascular    Rhythm: regular  Rate: normal         Dental         Pulmonary      Decreased breath sounds: bilateral           Abdominal  GI exam deferred      Comments: Vent: AC/VC: Vt 500, RR 10, FiO2 21%, Peep 5.0     Propofol: 20mcg/kg/min.   Other Findings            Anesthetic Plan    ASA: 4  Anesthesia type: general          Induction: Intravenous  Anesthetic plan and risks discussed with: Son / Daughter

## 2022-05-31 NOTE — PROGRESS NOTES
Vancomycin Dosing Consult  Day #17 of vancomycin therapy  Consult ordered by Dr. Carri Cr / Dr. Katlyn Hatfield for this 78 y.o. female for indication of decubitus ulcer infection, sepsis. Antibiotic regimen: Vancomycin + Unasyn  + Fluconazole    Temp (24hrs), Av.4 °F (36.9 °C), Min:98 °F (36.7 °C), Max:98.8 °F (37.1 °C)    Recent Labs     22  0340   WBC 14.3* 13.5* 13.5*     Recent Labs     22  0340   CREA 0.80 0.77 0.68   BUN 29* 25* 22*     Recent Labs     22  0340   CRP 11.40* 12.60* 11.00*     Recent Labs     22  0340   PCT 0.26* 0.24* 0.31*       Estimated Creatinine Clearance: 67.7 mL/min (based on SCr of 0.8 mg/dL). ml/min  Concomitant nephrotoxic drugs: Norepinephrine     Cultures:    Wound: Moderate MDR Pseudomonas aeruginosa susceptible to Zerbaxa, aminoglycosides), moderate mixed skin yasmine, moderate mixed enteric yasmine including yeast, final   Blood: CoNS in the anaerobic bottle, final   Wound, ulcer:  Moderate Acinetobacter baumannii, moderate Enterococcus faecium, light >2 organisms (contraindicated), final  5/15 Wound, great toe: Heavy Acinetobacter baumannii complex (Zosyn resistant, susceptible to Unasyn), heavy Diphtheroids, final   Tissue: pending  MRSA Swab: Not ordered, patient already received first dose of vancomycin    Target range: Trough 10-15 mcg/mL     Recent level history:  Date/Time Dose & Interval Measured Level (mcg/mL)   528     750mg    13.4       1000mg @ 1519    12.4          750mg @ 1200    17.7     Wt Readings from Last 1 Encounters:   22 84.5 kg (186 lb 4.6 oz)   Ideal body weight: 61.4 kg (135 lb 7.1 oz)  Adjusted ideal body weight: 70.7 kg (155 lb 12.5 oz)      Assessment/Plan:   Afebrile , Sepsis with leukocytosis elevated procalcitonin and CRP  CKD,  Scr trending up  Level therapeutic today at 17.7.    Will Give 750 mg vancomycin IV dose  today at 1200.   A random level has been scheduled for 6/1/22 at  0400 am.  Pharmacy to continue to monitor level and adjust   Antimicrobial stop date TBD

## 2022-06-01 NOTE — PROGRESS NOTES
General Daily Progress Note          Patient Name:   Marcella Wong       YOB: 1945       Age:  78 y.o. 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 Cipro    Admitted again yesterday concerning for worsening of sacral ulcer    During last admission patient was seen by infectious disease and also seen by the vascular surgeon patient had debridement of wound during the last admission    Patient seen by the infectious disease yesterday    Sacral wound infection recent cultures growing Pseudomonas resistant to Zosyn and meropenem    5/17    Patient alert awake not in distress  Patient was seen by the vascular surgeon    Vascular surgery planned for laparoscopic loop colostomy placement and sacral debridement then wound vacuum  Also try to close the wound on the left foot with TMA    5/18    Resting in the bed not in any distress  Blood sugars running high    Seen by infectious disease continue vancomycin and start on Unasyn    5/19    And went to the OR intubated because of elevated LFT and elevated INR  Surgery was canceled    On ventilator 40% O2  Propofol drip    5/20    Patient on ventilator with 30% O2  On propofol drip    Hemoglobin dropped to 6.8    Patient's daughter at the bedside    5/20    Patient on ventilator 30% O2  On propofol drip  Getting PEG tube feeding    5/21    Patient still on ventilator 30% O2  On propofol drip  Liver Function improving    5/22  On ventilator with 30% O2 on propofol drip    5/24  Awaiting tissue culture results.    On ventilator with 30% O2 on propofol drip  Left transmetatarsal amputation and Sacral wound excisional wound debridement 13 x 15 cm to the bone completed yesterday. CXR: Hazy mid and lower lung reticular markings.  Dependent small to moderate volume,  right greater than left pleural effusions  Remarkable labs   WBC: 15.2   Hgb: 8.5   Na; 148  CRP: 13.60   Procal: 0.71      5/25    Patient on ventilator with 30% O2  Seen by the vascular surgeon and plan for surgery today    5/26  Patient on ventilator with 30% O2  Hypotensive on Levophed  On propofol drip    Surgery was canceled yesterday because patient unstable hemodynamically not cleared by the anesthesia    5/27    Patient on ventilator with 30% O2  Hypotensive on Levophed  Getting PEG tube feeding    5/30     Patient on ventilator with 21% O2  On propofol drip  Off pressor    5/31    Patient on ventilator with 21% O2  On propofol drip    Off pressor  Patient have a wound vacuum      6/1    Patient scheduled for surgery today    Objective:     Visit Vitals  BP (!) 119/55 (BP 1 Location: Left upper arm, BP Patient Position: At rest)   Pulse 62   Temp 97.6 °F (36.4 °C)   Resp 11   Ht 5' 6.93\" (1.7 m)   Wt 84.5 kg (186 lb 4.6 oz)   SpO2 100%   Breastfeeding No   BMI 29.24 kg/m²        Recent Results (from the past 24 hour(s))   GLUCOSE, POC    Collection Time: 05/31/22 11:16 AM   Result Value Ref Range    Glucose (POC) 221 (H) 65 - 117 mg/dL    Performed by [address] [place] [address], POC    Collection Time: 05/31/22  5:49 PM   Result Value Ref Range    Glucose (POC) 175 (H) 65 - 117 mg/dL    Performed by [address] [place] [address], POC    Collection Time: 05/31/22 10:22 PM   Result Value Ref Range    Glucose (POC) 134 (H) 65 - 117 mg/dL    Performed by Anne Duncan    BLOOD GAS, ARTERIAL    Collection Time: 06/01/22  4:35 AM   Result Value Ref Range    pH 7.44 7.35 - 7.45      PCO2 29 (L) 35 - 45 mmHg    PO2 79 75 - 100 mmHg    O2 SAT 97 >95 %    BICARBONATE 22 22 - 26 mmol/L    BASE DEFICIT 3.4 (H) 0 - 2 mmol/L    O2 METHOD VENT      FIO2 21.0 %    MODE Assist Control/Volume Control      Tidal volume 500      SET RATE 10      EPAP/CPAP/PEEP 5.0      Sample source Arterial      SITE Right Brachial     CBC WITH AUTOMATED DIFF    Collection Time: 06/01/22  4:45 AM   Result Value Ref Range    WBC 14.7 (H) 3.6 - 11.0 K/uL    RBC 3.00 (L) 3.80 - 5.20 M/uL    HGB 8.5 (L) 11.5 - 16.0 g/dL    HCT 26.8 (L) 35.0 - 47.0 %    MCV 89.3 80.0 - 99.0 FL    MCH 28.3 26.0 - 34.0 PG    MCHC 31.7 30.0 - 36.5 g/dL    RDW 16.6 (H) 11.5 - 14.5 %    PLATELET 418 172 - 861 K/uL    MPV 11.1 8.9 - 12.9 FL    NRBC 0.0 0.0  WBC    ABSOLUTE NRBC 0.00 0.00 - 0.01 K/uL    NEUTROPHILS 76 (H) 32 - 75 %    LYMPHOCYTES 14 12 - 49 %    MONOCYTES 5 5 - 13 %    EOSINOPHILS 3 0 - 7 %    BASOPHILS 1 0 - 1 %    IMMATURE GRANULOCYTES 1 (H) 0 - 0.5 %    ABS. NEUTROPHILS 11.3 (H) 1.8 - 8.0 K/UL    ABS. LYMPHOCYTES 2.0 0.8 - 3.5 K/UL    ABS. MONOCYTES 0.7 0.0 - 1.0 K/UL    ABS. EOSINOPHILS 0.4 0.0 - 0.4 K/UL    ABS. BASOPHILS 0.1 0.0 - 0.1 K/UL    ABS. IMM. GRANS. 0.1 (H) 0.00 - 0.04 K/UL    DF AUTOMATED     PROCALCITONIN    Collection Time: 06/01/22  4:45 AM   Result Value Ref Range    Procalcitonin 0.26 (H) 0 ng/mL   METABOLIC PANEL, COMPREHENSIVE    Collection Time: 06/01/22  4:45 AM   Result Value Ref Range    Sodium 144 136 - 145 mmol/L    Potassium 3.6 3.5 - 5.1 mmol/L    Chloride 115 (H) 97 - 108 mmol/L    CO2 22 21 - 32 mmol/L    Anion gap 7 5 - 15 mmol/L    Glucose 114 (H) 65 - 100 mg/dL    BUN 27 (H) 6 - 20 mg/dL    Creatinine 0.77 0.55 - 1.02 mg/dL    BUN/Creatinine ratio 35 (H) 12 - 20      GFR est AA >60 >60 ml/min/1.73m2    GFR est non-AA >60 >60 ml/min/1.73m2    Calcium 8.5 8.5 - 10.1 mg/dL    Bilirubin, total 0.5 0.2 - 1.0 mg/dL    AST (SGOT) 16 15 - 37 U/L    ALT (SGPT) 54 12 - 78 U/L    Alk.  phosphatase 101 45 - 117 U/L    Protein, total 6.3 (L) 6.4 - 8.2 g/dL    Albumin 1.5 (L) 3.5 - 5.0 g/dL    Globulin 4.8 (H) 2.0 - 4.0 g/dL    A-G Ratio 0.3 (L) 1.1 - 2.2     VANCOMYCIN, RANDOM    Collection Time: 06/01/22  4:45 AM   Result Value Ref Range    Vancomycin, random 19.8 ug/mL   C REACTIVE PROTEIN, QT    Collection Time: 06/01/22  4:45 AM   Result Value Ref Range    C-Reactive protein 14.60 (H) 0.00 - 0.60 mg/dL   MAGNESIUM    Collection Time: 06/01/22  4:45 AM   Result Value Ref Range    Magnesium 1.8 1.6 - 2.4 mg/dL   PHOSPHORUS    Collection Time: 06/01/22  4:45 AM   Result Value Ref Range    Phosphorus 3.1 2.6 - 4.7 mg/dL   GLUCOSE, POC    Collection Time: 06/01/22  5:54 AM   Result Value Ref Range    Glucose (POC) 91 65 - 117 mg/dL    Performed by Ashu Jorge    PROTHROMBIN TIME + INR    Collection Time: 06/01/22  6:00 AM   Result Value Ref Range    Prothrombin time 17.9 (H) 11.9 - 14.6 sec    INR 1.5 (H) 0.9 - 1.1       [unfilled]      Review of Systems    Not a good historian e. Physical Exam:      Constitutional: On ventilator  HENT:   Head: Normocephalic and atraumatic. Eyes: Pupils are equal, round, and reactive to light. EOM are normal.   Cardiovascular: Normal rate, regular rhythm and normal heart sounds. Pulmonary/Chest: Breath sounds normal. No wheezes. No rales. Exhibits no tenderness. Abdominal: Soft. Bowel sounds are normal. There is no abdominal tenderness. There is no rebound and no guarding. Musculoskeletal: Normal range of motion. Neurological: On ventilator   skin sacral decubitus ulcer and left foot wound right big toe amputation    XR CHEST PORT   Final Result      XR CHEST PORT   Final Result      XR CHEST PORT   Final Result   No interval change or acute process. XR CHEST PORT   Final Result   ET tube retracted from previous, with tip now at the level of the   thoracic inlet, 8-9 cm above the jennifer. Stable appearance of right central   line. Stable mild enlargement of cardiopericardial silhouette. No evidence of   pulmonary edema, air space pneumonia, or pleural effusion. No evidence of   pneumothorax.  Some structures are partially obscured by overlying monitoring   electrodes. XR CHEST PORT   Final Result   Basilar airspace disease, possibly subsegmental atelectasis, stable. XR CHEST PORT   Final Result   Cardiomegaly with vascular congestion. Stable appearance of ET tube. Right central line placed, without radiographic evidence of complication. IR INSERT NON TUNL CVC OVER 5 YRS   Final Result   Successful placement of a triple-lumen central venous catheter. The   catheter is ready for use. XR CHEST PORT   Final Result      XR CHEST PORT   Final Result      XR CHEST PORT   Final Result      CT ABD PELV WO CONT   Final Result   Third spacing of fluids with small volume ascites, small-moderate   pleural effusions, and mild body wall edema. Bibasilar atelectasis. XR CHEST PORT   Final Result   Similar findings compared to single view chest 1 day earlier. XR CHEST PORT   Final Result   Findings/IMPRESSION: Stable appearance of endotracheal tube. Stable mild   enlargement of cardiomediastinal silhouette. Central vascular congestion with   bilateral perihilar and basilar opacities, consistent with edema and/or   pneumonia, right greater than left. Possible right pleural effusion. No   pneumothorax. XR CHEST PORT   Final Result   Bibasilar opacities, possibly increased on the right. XR CHEST PORT   Final Result   No significant change allowing for difference in technique and   positioning. Single portable AP view compared to yesterday. Suboptimal inspiratory film   compromises visualization of the lower lung fields. Monitoring equipment   overlies the body. The tip of the tube is approximately 3 cm above the jennifer. Mild apparent cardiomegaly. Left heart border and left diaphragm obscured. Hazy airspace opacification both lung bases may be a combination of atelectasis,   pneumonia, or edema. No large effusion. Negative for pneumothorax.       XR CHEST PORT   Final Result   No significant change allowing for difference in technique and   positioning. Single portable AP view compared to yesterday. Rotation to the right. Monitoring   equipment overlies the body. Suboptimal inspiratory film compromises   visualization of the lower lung fields. Mild cardiomegaly. The tip of the ET tube is approximately 3 cm above the jennifer. Mild basal interstitial edema. No new consolidation. No pleural effusion or   pneumothorax. XR CHEST PORT   Final Result   1. The endotracheal tube is in satisfactory position. 2.  There has been a partial interval resolution in the pulmonary interstitial   edema pattern. XR CHEST PORT   Final Result   Ill-defined haziness in the mid to lower lung zones suspect for mild   atelectatic changes and/or interstitial fluid or pleural fluid. US ABD LTD   Final Result   1. Question pericholecystic fluid. No identified gallstones or sludge. 2. Right pleural effusion. 3. Increased right renal echotexture for medical renal disease. XR CHEST PORT   Final Result   Intubation. Findings suggesting hydrostatic edema. XR CHEST PORT   Final Result   No evidence of an acute cardiopulmonary process.       IR FLUORO GUIDE PLC CVAD    (Results Pending)   IR US GUIDED VASCULAR ACCESS    (Results Pending)   XR CHEST PORT    (Results Pending)        Recent Results (from the past 24 hour(s))   GLUCOSE, POC    Collection Time: 05/31/22 11:16 AM   Result Value Ref Range    Glucose (POC) 221 (H) 65 - 117 mg/dL    Performed by Jovanny Benoit, POC    Collection Time: 05/31/22  5:49 PM   Result Value Ref Range    Glucose (POC) 175 (H) 65 - 117 mg/dL    Performed by Jovanny [employer], POC    Collection Time: 05/31/22 10:22 PM   Result Value Ref Range    Glucose (POC) 134 (H) 65 - 117 mg/dL    Performed by Keyshawn Lofton    BLOOD GAS, ARTERIAL    Collection Time: 06/01/22  4:35 AM   Result Value Ref Range    pH 7.44 7.35 - 7.45      PCO2 29 (L) 35 - 45 mmHg    PO2 79 75 - 100 mmHg    O2 SAT 97 >95 %    BICARBONATE 22 22 - 26 mmol/L    BASE DEFICIT 3.4 (H) 0 - 2 mmol/L    O2 METHOD VENT      FIO2 21.0 %    MODE Assist Control/Volume Control      Tidal volume 500      SET RATE 10      EPAP/CPAP/PEEP 5.0      Sample source Arterial      SITE Right Brachial     CBC WITH AUTOMATED DIFF    Collection Time: 06/01/22  4:45 AM   Result Value Ref Range    WBC 14.7 (H) 3.6 - 11.0 K/uL    RBC 3.00 (L) 3.80 - 5.20 M/uL    HGB 8.5 (L) 11.5 - 16.0 g/dL    HCT 26.8 (L) 35.0 - 47.0 %    MCV 89.3 80.0 - 99.0 FL    MCH 28.3 26.0 - 34.0 PG    MCHC 31.7 30.0 - 36.5 g/dL    RDW 16.6 (H) 11.5 - 14.5 %    PLATELET 113 074 - 320 K/uL    MPV 11.1 8.9 - 12.9 FL    NRBC 0.0 0.0  WBC    ABSOLUTE NRBC 0.00 0.00 - 0.01 K/uL    NEUTROPHILS 76 (H) 32 - 75 %    LYMPHOCYTES 14 12 - 49 %    MONOCYTES 5 5 - 13 %    EOSINOPHILS 3 0 - 7 %    BASOPHILS 1 0 - 1 %    IMMATURE GRANULOCYTES 1 (H) 0 - 0.5 %    ABS. NEUTROPHILS 11.3 (H) 1.8 - 8.0 K/UL    ABS. LYMPHOCYTES 2.0 0.8 - 3.5 K/UL    ABS. MONOCYTES 0.7 0.0 - 1.0 K/UL    ABS. EOSINOPHILS 0.4 0.0 - 0.4 K/UL    ABS. BASOPHILS 0.1 0.0 - 0.1 K/UL    ABS. IMM. GRANS. 0.1 (H) 0.00 - 0.04 K/UL    DF AUTOMATED     PROCALCITONIN    Collection Time: 06/01/22  4:45 AM   Result Value Ref Range    Procalcitonin 0.26 (H) 0 ng/mL   METABOLIC PANEL, COMPREHENSIVE    Collection Time: 06/01/22  4:45 AM   Result Value Ref Range    Sodium 144 136 - 145 mmol/L    Potassium 3.6 3.5 - 5.1 mmol/L    Chloride 115 (H) 97 - 108 mmol/L    CO2 22 21 - 32 mmol/L    Anion gap 7 5 - 15 mmol/L    Glucose 114 (H) 65 - 100 mg/dL    BUN 27 (H) 6 - 20 mg/dL    Creatinine 0.77 0.55 - 1.02 mg/dL    BUN/Creatinine ratio 35 (H) 12 - 20      GFR est AA >60 >60 ml/min/1.73m2    GFR est non-AA >60 >60 ml/min/1.73m2    Calcium 8.5 8.5 - 10.1 mg/dL    Bilirubin, total 0.5 0.2 - 1.0 mg/dL    AST (SGOT) 16 15 - 37 U/L    ALT (SGPT) 54 12 - 78 U/L    Alk. phosphatase 101 45 - 117 U/L    Protein, total 6.3 (L) 6.4 - 8.2 g/dL    Albumin 1.5 (L) 3.5 - 5.0 g/dL    Globulin 4.8 (H) 2.0 - 4.0 g/dL    A-G Ratio 0.3 (L) 1.1 - 2.2     VANCOMYCIN, RANDOM    Collection Time: 06/01/22  4:45 AM   Result Value Ref Range    Vancomycin, random 19.8 ug/mL   C REACTIVE PROTEIN, QT    Collection Time: 06/01/22  4:45 AM   Result Value Ref Range    C-Reactive protein 14.60 (H) 0.00 - 0.60 mg/dL   MAGNESIUM    Collection Time: 06/01/22  4:45 AM   Result Value Ref Range    Magnesium 1.8 1.6 - 2.4 mg/dL   PHOSPHORUS    Collection Time: 06/01/22  4:45 AM   Result Value Ref Range    Phosphorus 3.1 2.6 - 4.7 mg/dL   GLUCOSE, POC    Collection Time: 06/01/22  5:54 AM   Result Value Ref Range    Glucose (POC) 91 65 - 117 mg/dL    Performed by Gail Bishop    PROTHROMBIN TIME + INR    Collection Time: 06/01/22  6:00 AM   Result Value Ref Range    Prothrombin time 17.9 (H) 11.9 - 14.6 sec    INR 1.5 (H) 0.9 - 1.1         Results     Procedure Component Value Units Date/Time    CULTURE, Hamilton Fee STAIN [[id number]]     Order Status: Sent Specimen: Sacral Wound     CULTURE, TISSUE Kyleigh Castellanos STAIN [[id number]] Collected: 05/23/22 1815    Order Status: Completed Specimen: Tissue Updated: 05/26/22 1510     Special Requests: No Special Requests        GRAM STAIN Rare WBCs seen         Few Gram Negative Rods        Culture result: Heavy  Mixed skin yasmine isolated       CULTURE, RESPIRATORY/SPUTUM/BRONCH Herblyne Jasmin STAIN [[id number]] Collected: 05/19/22 1448    Order Status: Completed Specimen: Sputum Updated: 05/21/22 0813     Special Requests: No Special Requests        GRAM STAIN 1+ WBCs seen         no epithelial cells seen         No organisms seen        Culture result:       Rare Normal respiratory yasmine                 Labs:     Recent Labs     06/01/22  0445 05/31/22  0315   WBC 14.7* 14.3*   HGB 8.5* 6.7*   HCT 26.8* 21.3*    381     Recent Labs     06/01/22  0445 05/31/22  0315 05/30/22  0248  141 140   K 3.6 3.4* 3.3*   * 115* 113*   CO2 22 18* 18*   BUN 27* 29* 25*   CREA 0.77 0.80 0.77   * 150* 209*   CA 8.5 7.8* 8.5   MG 1.8  --  1.4*   PHOS 3.1  --  2.8     Recent Labs     06/01/22  0445 05/31/22  0315 05/30/22  0247   ALT 54 55 70    88 91   TBILI 0.5 0.3 0.3   TP 6.3* 6.8 7.0   ALB 1.5* 1.4* 1.4*   GLOB 4.8* 5.4* 5.6*     Recent Labs     06/01/22  0600   INR 1.5*   PTP 17.9*      No results for input(s): FE, TIBC, PSAT, FERR in the last 72 hours. No results found for: FOL, RBCF   Recent Labs     06/01/22  0435 05/31/22  0340   PH 7.44 7.37   PCO2 29* 31*   PO2 79 123*     No results for input(s): CPK, CKNDX, TROIQ in the last 72 hours.     No lab exists for component: CPKMB  Lab Results   Component Value Date/Time    Cholesterol, total 124 03/08/2022 07:40 AM    HDL Cholesterol 30 03/08/2022 07:40 AM    LDL,Direct 79 06/28/2021 12:00 AM    LDL, calculated 44.8 03/08/2022 07:40 AM    Triglyceride 202 (H) 05/16/2022 06:20 AM    CHOL/HDL Ratio 4.1 03/08/2022 07:40 AM     Lab Results   Component Value Date/Time    Glucose (POC) 91 06/01/2022 05:54 AM    Glucose (POC) 134 (H) 05/31/2022 10:22 PM    Glucose (POC) 175 (H) 05/31/2022 05:49 PM    Glucose (POC) 221 (H) 05/31/2022 11:16 AM    Glucose (POC) 197 (H) 05/31/2022 05:30 AM     Lab Results   Component Value Date/Time    Color Yellow/Straw 05/31/2022 05:56 AM    Appearance Turbid (A) 05/31/2022 05:56 AM    Specific gravity 1.006 05/31/2022 05:56 AM    pH (UA) 5.0 05/31/2022 05:56 AM    Protein 30 (A) 05/31/2022 05:56 AM    Glucose Negative 05/31/2022 05:56 AM    Ketone Negative 05/31/2022 05:56 AM    Bilirubin Negative 05/31/2022 05:56 AM    Urobilinogen 0.1 05/31/2022 05:56 AM    Nitrites Negative 05/31/2022 05:56 AM    Leukocyte Esterase Large (A) 05/31/2022 05:56 AM    Bacteria Negative 05/31/2022 05:56 AM    WBC >100 (H) 05/31/2022 05:56 AM    RBC  05/31/2022 05:56 AM         Assessment:   Acute respiratory failure on ventilator 30% O2  Sacral decubitus ulcer postdebridement  Sepsis with leukocytosis elevated procalcitonin and CRP  Left foot wound  Recent removal of left great toe and second toe  CVA with PEG tube placement  History of aspiration pneumonia  History of chronic kidney disease  CAD with ejection fraction about 20 to 25%  Hypertension  Hyperlipidemia  Anemia of chronic disease  Hyperkalemia  Static post transfusion  Uncontrolled diabetes  Hepatic shock/improving  Coagulopathy  Elevated  Troponin  Bacteremia with coagulase-negative Staphylococcus  Procedure:  1. Left transmetatarsal amputation. 2.  Sacral wound excisional wound debridement 13 x 15 cm to the bone. Hypotension on Levophed  Hypokalemia/replace potassium  Plan:     Unasyn 3 g every 8 hours  Aspirin 81 mg daily  Questran 4 g twice a day  Pepcid 20 twice daily  Ferrous sulfate 325 mg twice a day  Lovenox 40 subcu daily  Lasix 20 mg IV every 12 hours  Pepcid 20 twice daily  Ferrous sulfate 325 mg twice a day  Lantus 50 units subcu daily and 10 units in p.m.   Gabapentin 300 mg twice a day  Hydralazine 12.5 mg 3 times a day  Metoprolol 25 daily  Vitamin K 10 mg daily  Replace potassium  Entresto 1 tablet twice a day  Vancomycin with pharmacy protocol  Replace potassium  2 A of sodium bicarb    Repeat  the labs overall prognosis very poor        laparoscopic loop colostomy for fecal diversion canceled    Discussed with vascular surgeon plan for surgery/when cleared by the anesthesia    Continue current medications    Discussed with ICU nurse         Current Facility-Administered Medications:     0.9% sodium chloride infusion 250 mL, 250 mL, IntraVENous, PRN, Paulino, Shannan Herrera MD    [START ON 6/2/2022] Vancomycin random level to be drawn on 6/2/22 at 0400 am., , Other, ONCE, Jatinder Clemons MD    0.9% sodium chloride infusion 250 mL, 250 mL, IntraVENous, PRN, Adria Soto MD    fluconazole (DIFLUCAN) 200mg/100 mL IVPB (premix), 200 mg, IntraVENous, Q24H, James Shah MD, Last Rate: 100 mL/hr at 06/01/22 0916, 200 mg at 06/01/22 0916    aspirin chewable tablet 81 mg, 81 mg, Per G Tube, DAILY, Israel Soto MD, 81 mg at 05/31/22 0946    NOREPINephrine (LEVOPHED) 8 mg in 0.9% NS 250ml infusion, 0.5-16 mcg/min, IntraVENous, TITRATE, Israel Soto MD, Paused at 05/29/22 2046    sodium chloride (NS) flush 5-40 mL, 5-40 mL, IntraVENous, Q8H, Mynor Matthew MD, 10 mL at 06/01/22 0434    sodium chloride (NS) flush 5-40 mL, 5-40 mL, IntraVENous, PRN, Latia Ambriz MD, 10 mL at 05/31/22 2213    0.9% sodium chloride infusion 250 mL, 250 mL, IntraVENous, PRN, Andrade Bob MD    furosemide (LASIX) injection 20 mg, 20 mg, IntraVENous, Q12H, Andrade Bob MD, 20 mg at 05/31/22 2211    sodium chloride (NS) flush 5-40 mL, 5-40 mL, IntraVENous, Q8H, Andrade Bob MD, 10 mL at 06/01/22 0434    [DISCONTINUED] ondansetron (ZOFRAN ODT) tablet 4 mg, 4 mg, Oral, Q8H PRN **OR** ondansetron (ZOFRAN) injection 4 mg, 4 mg, IntraVENous, Q6H PRN, Andrade Bob MD    enoxaparin (LOVENOX) injection 40 mg, 40 mg, SubCUTAneous, DAILY, Andrade Bob MD, 40 mg at 05/31/22 0946    dextrose 5% infusion, 30 mL/hr, IntraVENous, CONTINUOUS, Kalpesh Lara MD, Paused at 05/31/22 1602    insulin lispro (HUMALOG) injection, , SubCUTAneous, Q6H, Andrade Bob MD, 1 Units at 05/31/22 1817    glucose chewable tablet 16 g, 4 Tablet, Oral, PRN, Andrade Bob MD    glucagon [facility]) injection 1 mg, 1 mg, IntraMUSCular, PRN, Andrade Bob MD    dextrose 10% infusion 0-250 mL, 0-250 mL, IntraVENous, PRN, Andrade Bob MD    hydrALAZINE (APRESOLINE) tablet 12.5 mg, 12.5 mg, Oral, TID, Andrade Bob MD, 12.5 mg at 05/31/22 2211    sacubitriL-valsartan (ENTRESTO) 24-26 mg tablet 1 Tablet, 1 Tablet, Oral, Q12H, Andrade Bob MD, 1 Tablet at 05/31/22 2210    metoprolol succinate (TOPROL-XL) XL tablet 25 mg, 25 mg, Oral, DAILY, Paulino, Kadeem Ureña MD, 25 mg at 05/30/22 0919    ampicillin-sulbactam (UNASYN) 3 g in 0.9% sodium chloride (MBP/ADV) 100 mL MBP, 3 g, IntraVENous, Q8H, Kadeem Bob MD, Last Rate: 200 mL/hr at 06/01/22 0556, 3 g at 06/01/22 0556    dextrose 10% infusion 0-250 mL, 0-250 mL, IntraVENous, PRN, Kadeem Bob MD, 125 mL at 05/19/22 0537    insulin glargine (LANTUS) injection 10 Units, 10 Units, SubCUTAneous, QHS, Kadeem Bob MD, 10 Units at 05/31/22 2220    propofol (DIPRIVAN) 10 mg/mL infusion, 0-50 mcg/kg/min, IntraVENous, TITRATE, Kadeem Bob MD, Last Rate: 9.5 mL/hr at 06/01/22 0431, 20 mcg/kg/min at 06/01/22 0431    0.9% sodium chloride infusion 250 mL, 250 mL, IntraVENous, PRN, Kadeem Bob MD, Last Rate: 15 mL/hr at 05/22/22 0820, Rate Verify at 05/22/22 0820    sodium hypochlorite (HALF STRENGTH DAKIN'S) 0.25% irrigation (bottle), , Topical, BID, Kadeem Bob MD, Given at 05/31/22 2214    Vancomycin Pharmacy Dosing, , Other, Rx Dosing/Monitoring, Kadeem Bob MD    acetaminophen (TYLENOL) tablet 650 mg, 650 mg, Oral, Q6H PRN, 650 mg at 05/22/22 0556 **OR** acetaminophen (TYLENOL) suppository 650 mg, 650 mg, Rectal, Q6H PRN, Kadeem Bob MD, 650 mg at 05/16/22 2223    polyethylene glycol (MIRALAX) packet 17 g, 17 g, Oral, DAILY PRN, Kadeem Bob MD    ondansetron (ZOFRAN ODT) tablet 4 mg, 4 mg, Oral, Q8H PRN **OR** [DISCONTINUED] ondansetron (ZOFRAN) injection 4 mg, 4 mg, IntraVENous, Q6H PRN, Kadeem Bob MD    famotidine (PEPCID) tablet 20 mg, 20 mg, Oral, BID, Kadeem Bob MD, 20 mg at 05/31/22 2211    acidophilus-pectin, citrus tablet 2 Tablet, 2 Tablet, Oral, DAILY, Kadeem Bob MD, 2 Tablet at 05/31/22 0946    albuterol-ipratropium (DUO-NEB) 2.5 MG-0.5 MG/3 ML, 3 mL, Nebulization, Q6H PRN, Kadeem Bob MD    artificial saliva (MOUTH KOTE) 1 Spray, 1 Spray, Oral, TID, Kadeem Bob MD, 1 [place] at 05/31/22 2212    brimonidine (ALPHAGAN) 0.2 % ophthalmic solution 1 Drop, 1 Drop, Both Eyes, TID, Fernando Bob MD, 1 Drop at 06/01/22 0917    cholestyramine-aspartame (QUESTRAN LIGHT) packet 4 g, 4 g, Oral, BID WITH MEALS, Fernando Bob MD, 4 g at 05/31/22 1716    [Held by provider] fenofibrate nanocrystallized (TRICOR) tablet 48 mg, 48 mg, Oral, DAILY, Fernando Bob MD, 48 mg at 05/19/22 4571    ferrous sulfate tablet 325 mg, 325 mg, Oral, BID, Fernando Bob MD, 325 mg at 05/31/22 2210    gabapentin (NEURONTIN) capsule 300 mg, 300 mg, Oral, BID, Fernando Bob MD, 300 mg at 05/31/22 2210    insulin glargine (LANTUS) injection 50 Units, 50 Units, SubCUTAneous, DAILY, Fernando Bob MD, 50 Units at 05/31/22 0947    latanoprost (XALATAN) 0.005 % ophthalmic solution 1 Drop, 1 Drop, Right Eye, QPM, Fernando Bob MD, 1 Drop at 05/31/22 1716    traMADoL (ULTRAM) tablet 25 mg, 25 mg, Oral, Q12H PRN, Fernando Bob MD, 25 mg at 05/18/22 0603

## 2022-06-01 NOTE — PROGRESS NOTES
Problem: Falls - Risk of  Goal: *Absence of Falls  Description: Document Morene Hole Fall Risk and appropriate interventions in the flowsheet. Outcome: Progressing Towards Goal  Note: Fall Risk Interventions:  Mobility Interventions: Bed/chair exit alarm    Mentation Interventions: Adequate sleep, hydration, pain control,More frequent rounding    Medication Interventions: Bed/chair exit alarm    Elimination Interventions: Bed/chair exit alarm              Problem: Patient Education: Go to Patient Education Activity  Goal: Patient/Family Education  Outcome: Progressing Towards Goal     Problem: Pressure Injury - Risk of  Goal: *Prevention of pressure injury  Description: Document Shakir Scale and appropriate interventions in the flowsheet. Outcome: Progressing Towards Goal  Note: Pressure Injury Interventions:  Sensory Interventions: Assess changes in LOC    Moisture Interventions: Apply protective barrier, creams and emollients,Internal/External urinary devices,Maintain skin hydration (lotion/cream),Minimize layers,Moisture barrier    Activity Interventions: Pressure redistribution bed/mattress(bed type)    Mobility Interventions: Assess need for specialty bed,Float heels,Turn and reposition approx.  every two hours(pillow and wedges)    Nutrition Interventions: Document food/fluid/supplement intake,Discuss nutritional consult with provider    Friction and Shear Interventions: Minimize layers,Feet elevated on foot rest                Problem: Patient Education: Go to Patient Education Activity  Goal: Patient/Family Education  Outcome: Progressing Towards Goal     Problem: Patient Education: Go to Patient Education Activity  Goal: Patient/Family Education  Outcome: Progressing Towards Goal     Problem: Patient Education: Go to Patient Education Activity  Goal: Patient/Family Education  Outcome: Progressing Towards Goal     Problem: Risk for Spread of Infection  Goal: Prevent transmission of infectious organism to others  Description: Prevent the transmission of infectious organisms to other patients, staff members, and visitors.   Outcome: Progressing Towards Goal     Problem: Patient Education:  Go to Education Activity  Goal: Patient/Family Education  Outcome: Progressing Towards Goal     Problem: Ventilator Management  Goal: *Adequate oxygenation and ventilation  Outcome: Progressing Towards Goal  Goal: *Patient maintains clear airway/free of aspiration  Outcome: Progressing Towards Goal  Goal: *Absence of infection signs and symptoms  Outcome: Progressing Towards Goal     Problem: Patient Education: Go to Patient Education Activity  Goal: Patient/Family Education  Outcome: Progressing Towards Goal

## 2022-06-01 NOTE — PROGRESS NOTES
Recent clinicals sent to SNF's via [facility]. Remains acute in ICU on ventilator.          Pierce Cooper, MSW

## 2022-06-01 NOTE — PROGRESS NOTES
Comprehensive Nutrition Assessment    Type and Reason for Visit: Reassess (goal)    Nutrition Recommendations/Plan:   1. Continue NPO  2. Adjust TF via PEG to Glucerna 1.5 to new goal of 60mL/hr, continuous        Flush with 75 mL H2O Q4H        Provides 2160 kcal (100%), 119g PRO (100%), 1544 mL H2O (97%).        Propofol at 20mcg/kg/min providing 251kcals/d (100%)        *Regimen appropriate for s/p extubation      4. Please document TF rate, tolerance, BM in I/Os. Malnutrition Assessment:  Malnutrition Status:  Severe malnutrition (05/19/22 1143)    Context:  Chronic illness     Findings of the 6 clinical characteristics of malnutrition:   Energy Intake:  No significant decrease in energy intake (via TF)  Weight Loss:  Greater than 5% over 1 month (16# x1 month per EMR (8.3%, severe))     Body Fat Loss:  No significant body fat loss,     Muscle Mass Loss:  Severe muscle mass loss, Clavicles (pectoralis &deltoids),Thigh (quadriceps)  Fluid Accumulation:  No significant fluid accumulation,        Nutrition Assessment:    Admitted for sacral PI, (4/21) Debridment. (5/19) Plan for procedure however delayed d/t hypotension. No pressor started, MAP 65-70mmHg, pt now intubated in ICU. (5/23) Repeat I&D, L TMA--Wound vacs placed. Per SHARAD, pt now stable enough to attempt diverting colostomy, ?vent wean before or after. (5/15) TF initiated, intermittently held for procedures, pressors. Pressors now off, TF continues at goal without issue. Noted pt on Glucerna pta and initially, adjusted to Vital s/p intubation. Will consider re-initiating Glucerna once off pressors and propofol need reduced. No changes to regimen at this time. Labs: H/H 8.5/26.8, BUN 27, BG , Alb 1.5. Meds: Probiotic, ampicillin, Questran Light, pepcid, FeSu, fluconazole,  lasix, Insulin, Propofol at 20mcg/kg/min. Nutrition Related Findings:    Unable to complete updated NFPE, pt out of room. PEG in place, infusing.  Generalized anasarca and +1 pitting edema to x4 extremities. Last BM 6/1, watery, chronic diarrhea per daughter however improvements inpatient. Wound Type: Surgical incision,Multiple,Wound vac (Surgical- L foot s/p TMA with wound vac; Stage 4 sacrum s/p I/D with wound vac)      Current Nutrition Intake & Therapies:  Average Meal Intake: NPO  Average Supplement Intake: NPO  DIET NPO  ADULT TUBE FEEDING PEG; Diabetic; Delivery Method: Continuous; Continuous Initial Rate (mL/hr): 45; Continuous Advance Tube Feeding: Yes; Advancement Volume (mL/hr): 15; Advancement Frequency: Q 4 hours; Continuous Goal Rate (mL/hr): 60; Water Flu. .. Additional Calorie Sources:  Propofol at 20mcg/kg/min providing 251kcals/d      Anthropometric Measures:  Height: 5' 6.93\" (170 cm)  Ideal Body Weight (IBW): 135 lbs (61 kg)  Admission Body Weight: 175 lb  Current Body Wt:  84.5 kg (186 lb 4.6 oz) (5/31), 138 % IBW. Bed scale  Current BMI (kg/m2): 29.2        Weight Adjustment:  (EDW 80kg; BMI 27.6)                 BMI Category: Overweight (BMI 25.0-29. 9)    Estimated Daily Nutrient Needs:  Energy Requirements Based On: Kcal/kg (Stable vent x2 wk)  Weight Used for Energy Requirements:  (EDW)  Energy (kcal/day): 2000-2400kcals (25-30kcals/kg EDW bedbound vs wounds)  Weight Used for Protein Requirements: Other (specify) (EDW)  Protein (g/day): 120-136 (1.5-1.7g/kg)  Method Used for Fluid Requirements: ml/kg  Fluid (ml/day): 1600-2000ml (20-25ml/kg)    Nutrition Diagnosis:   · Severe malnutrition,In context of chronic illness related to catabolic illness as evidenced by weight loss greater than or equal to 5% in 1 month,severe muscle loss      Nutrition Interventions:   Food and/or Nutrient Delivery: Continue NPO,Modify tube feeding  Nutrition Education/Counseling: No recommendations at this time  Coordination of Nutrition Care: Continue to monitor while inpatient  Plan of Care discussed with: RN    Goals:  Previous Goal Met: Goal(s) achieved (Tolerate NST and meet EENs)  Goals: Meet at least 75% of estimated needs,Tolerate nutrition support at goal rate,within 7 days  Specify Other Goals: Maintain BGs <180 mg/dL.  Maintain CBW within +/- 0.5 kg    Nutrition Monitoring and Evaluation:   Behavioral-Environmental Outcomes: None identified  Food/Nutrient Intake Outcomes: Enteral nutrition intake/tolerance  Physical Signs/Symptoms Outcomes: Weight,Hemodynamic status,Biochemical data,Fluid status or edema,Skin,Nutrition focused physical findings,Diarrhea    Discharge Planning:    Enteral nutrition    Brenda Baez  Contact: Ext [phone], or via [employer]

## 2022-06-01 NOTE — PROGRESS NOTES
Subjective: Remains intubated and no longer requiring pressor support. Receiving 2 units of PRBCs this morning. Condition stable. INPATIENT MEDICATIONS:  Home medications reviewed.     Current Facility-Administered Medications:     0.9% sodium chloride infusion 250 mL, 250 mL, IntraVENous, PRN, Yarelis Bob MD    [START ON 6/2/2022] Vancomycin random level to be drawn on 6/2/22 at 0400 am., , Other, Dari Khan MD    0.9% sodium chloride infusion 250 mL, 250 mL, IntraVENous, PRN, Yun Soto MD    fluconazole (DIFLUCAN) 200mg/100 mL IVPB (premix), 200 mg, IntraVENous, Q24H, Racquel Kimball MD    aspirin chewable tablet 81 mg, 81 mg, Per G Tube, DAILY, Israel Soto MD, 81 mg at 05/31/22 0946    NOREPINephrine (LEVOPHED) 8 mg in 0.9% NS 250ml infusion, 0.5-16 mcg/min, IntraVENous, TITRATE, Israel Soto MD, Paused at 05/29/22 2046    sodium chloride (NS) flush 5-40 mL, 5-40 mL, IntraVENous, Q8H, Mynor Matthew MD, 10 mL at 06/01/22 0434    sodium chloride (NS) flush 5-40 mL, 5-40 mL, IntraVENous, PRN, Kathy Fowler MD, 10 mL at 05/31/22 2213    0.9% sodium chloride infusion 250 mL, 250 mL, IntraVENous, PRN, Yarelis Bob MD    furosemide (LASIX) injection 20 mg, 20 mg, IntraVENous, Q12H, Yarelis Bob MD, 20 mg at 05/31/22 2211    sodium chloride (NS) flush 5-40 mL, 5-40 mL, IntraVENous, Q8H, Yarelis Bob MD, 10 mL at 06/01/22 0434    [DISCONTINUED] ondansetron (ZOFRAN ODT) tablet 4 mg, 4 mg, Oral, Q8H PRN **OR** ondansetron (ZOFRAN) injection 4 mg, 4 mg, IntraVENous, Q6H PRN, Yarelis Bob MD    enoxaparin (LOVENOX) injection 40 mg, 40 mg, SubCUTAneous, DAILY, Paulino, Yarelis Perez MD, 40 mg at 05/31/22 0946    dextrose 5% infusion, 30 mL/hr, IntraVENous, CONTINUOUS, Kalpesh Lara MD, Paused at 05/31/22 1602    insulin lispro (HUMALOG) injection, , SubCUTAneous, Q6H, Yarelis Bob MD, 1 Units at 05/31/22 1817    glucose chewable tablet 16 g, 4 Tablet, Oral, PRN, Mouna Bob MD    glucagon [facility]) injection 1 mg, 1 mg, IntraMUSCular, PRN, Mouna Bob MD    dextrose 10% infusion 0-250 mL, 0-250 mL, IntraVENous, PRN, Mouna Bob MD    hydrALAZINE (APRESOLINE) tablet 12.5 mg, 12.5 mg, Oral, TID, Mouna Bob MD, 12.5 mg at 05/31/22 2211    sacubitriL-valsartan (ENTRESTO) 24-26 mg tablet 1 Tablet, 1 Tablet, Oral, Q12H, Mouna Bob MD, 1 Tablet at 05/31/22 2210    metoprolol succinate (TOPROL-XL) XL tablet 25 mg, 25 mg, Oral, DAILY, Mouna Bob MD, 25 mg at 05/30/22 0919    ampicillin-sulbactam (UNASYN) 3 g in 0.9% sodium chloride (MBP/ADV) 100 mL MBP, 3 g, IntraVENous, Q8H, Mouna Bob MD, Last Rate: 200 mL/hr at 06/01/22 0556, 3 g at 06/01/22 0556    dextrose 10% infusion 0-250 mL, 0-250 mL, IntraVENous, PRN, Mouna Bob MD, 125 mL at 05/19/22 0537    insulin glargine (LANTUS) injection 10 Units, 10 Units, SubCUTAneous, QHS, Mouna Bob MD, 10 Units at 05/31/22 2220    propofol (DIPRIVAN) 10 mg/mL infusion, 0-50 mcg/kg/min, IntraVENous, TITRATE, Mouna Bob MD, Last Rate: 9.5 mL/hr at 06/01/22 0431, 20 mcg/kg/min at 06/01/22 0431    0.9% sodium chloride infusion 250 mL, 250 mL, IntraVENous, PRN, Mouna Bob MD, Last Rate: 15 mL/hr at 05/22/22 0820, Rate Verify at 05/22/22 0820    sodium hypochlorite (HALF STRENGTH DAKIN'S) 0.25% irrigation (bottle), , Topical, BID, Mouna Bob MD, Given at 05/31/22 2214    Vancomycin Pharmacy Dosing, , Other, Rx Dosing/Monitoring, Mouna Bob MD    acetaminophen (TYLENOL) tablet 650 mg, 650 mg, Oral, Q6H PRN, 650 mg at 05/22/22 0556 **OR** acetaminophen (TYLENOL) suppository 650 mg, 650 mg, Rectal, Q6H PRN, Paulino, Mouna Amos MD, 650 mg at 05/16/22 2223    polyethylene glycol (MIRALAX) packet 17 g, 17 g, Oral, DAILY PRN, Mouna Bob MD    ondansetron (ZOFRAN ODT) tablet 4 mg, 4 mg, Oral, Q8H PRN **OR** [DISCONTINUED] ondansetron (ZOFRAN) injection 4 mg, 4 mg, IntraVENous, Q6H PRN, April Bob MD    famotidine (PEPCID) tablet 20 mg, 20 mg, Oral, BID, April Bob MD, 20 mg at 05/31/22 2211    acidophilus-pectin, citrus tablet 2 Tablet, 2 Tablet, Oral, DAILY, April Bob MD, 2 Tablet at 05/31/22 0946    albuterol-ipratropium (DUO-NEB) 2.5 MG-0.5 MG/3 ML, 3 mL, Nebulization, Q6H PRN, April Bob MD    artificial saliva (MOUTH KOTE) 1 Spray, 1 Spray, Oral, TID, April Bob MD, 1 [place] at 05/31/22 2212    brimonidine (ALPHAGAN) 0.2 % ophthalmic solution 1 Drop, 1 Drop, Both Eyes, TID, April Bob MD, 1 Drop at 05/31/22 2212    cholestyramine-aspartame (QUESTRAN LIGHT) packet 4 g, 4 g, Oral, BID WITH MEALS, April Bob MD, 4 g at 05/31/22 1716    [Held by provider] fenofibrate nanocrystallized (TRICOR) tablet 48 mg, 48 mg, Oral, DAILY, April Bob MD, 48 mg at 05/19/22 8561    ferrous sulfate tablet 325 mg, 325 mg, Oral, BID, April Bob MD, 325 mg at 05/31/22 2210    gabapentin (NEURONTIN) capsule 300 mg, 300 mg, Oral, BID, April Bob MD, 300 mg at 05/31/22 2210    insulin glargine (LANTUS) injection 50 Units, 50 Units, SubCUTAneous, DAILY, April Bob MD, 50 Units at 05/31/22 0947    latanoprost (XALATAN) 0.005 % ophthalmic solution 1 Drop, 1 Drop, Right Eye, QPM, April Bob MD, 1 Drop at 05/31/22 1716    traMADoL (ULTRAM) tablet 25 mg, 25 mg, Oral, Q12H PRN, April Bob MD, 25 mg at 05/18/22 0056       REVIEW OF SYSTEMS:  Intubated. Physical Exam:   General: Chronically ill appearing female lying in bed intubated/sedated, NAD  HEENT: Normocephalic, PERRL, no drainage  Neck: Supple, Trachea midline, No JVD  RESP: Coarse globally with diminished lower lobes. + Symmetrical chest movement. 30% FiO2. Intubated/Ventilated. Cardiovascular: RRR no RG. + murmur.   PVS: No rubor, cyanosis, mild pitting BLE edema, Radial, DP, PT pulses equal bilaterally  ABD: obese, soft, NT, Normoactive BS  Derm: +arterial line,+sacral wound vac  :+giraldo catheter  Neuro: Sedated with propofol, open eyes and followed simple command of blinking  PSYCH: No anxiety or agitation    Visit Vitals  BP (!) 125/57   Pulse 76   Temp 98.8 °F (37.1 °C)   Resp 11   Ht 5' 6.93\" (1.7 m)   Wt 84.5 kg (186 lb 4.6 oz)   SpO2 100%   Breastfeeding No   BMI 29.24 kg/m²     Case was discussed with Dr. Felicia Samaniego and our impression and recommendations are as follows:     1. New onset cardiomyopathy:    - Echo 5/18/22 showing EF of 20-25% down from 50-55% in 4/2022.    - Resume HF medications, patient no longer on pressor support  -  Recommend ischemic evaluation once patient is clinically stable. Deferred at this time due to patient's acuity and need for other surgical interventions. - The patient is considered high risk for upcoming procedure. - Discussed the potential cardiac risks involved with surgical interventions with the patient's family and they have requested to proceed with the surgery.      2. Elevated troponin:    - HS troponin peaked at 890, currently 275. -  Hgb 6.7, transfuse 2 units PRBCs    - Continue BB. -  Ischemic evaluation pending post- op clinical course.    - No statin given LFTs elevated likely due to shock.       3.   NSVT:   - No reoccurrence.    - Continue beta blocker.   - Maintain lytes.        Thank you for involving us in the care of this patient. Pretty Gallardo NP  6/1/2022       Dr. Selma Clark Cardiologist    [facility]  [address], [address] [facility]  [phone]

## 2022-06-01 NOTE — PROGRESS NOTES
Problem: Falls - Risk of  Goal: *Absence of Falls  Description: Document Beatriz Locket Fall Risk and appropriate interventions in the flowsheet. Outcome: Progressing Towards Goal  Note: Fall Risk Interventions:  Mobility Interventions: Strengthening exercises (ROM-active/passive)    Mentation Interventions: Adequate sleep, hydration, pain control,Toileting rounds    Medication Interventions: Evaluate medications/consider consulting pharmacy    Elimination Interventions: Toileting schedule/hourly rounds              Problem: Patient Education: Go to Patient Education Activity  Goal: Patient/Family Education  Outcome: Progressing Towards Goal     Problem: Pressure Injury - Risk of  Goal: *Prevention of pressure injury  Description: Document Shakir Scale and appropriate interventions in the flowsheet. Outcome: Progressing Towards Goal  Note: Pressure Injury Interventions:  Sensory Interventions: Float heels,Minimize linen layers,Monitor skin under medical devices,Turn and reposition approx. every two hours (pillows and wedges if needed),Suspension boots    Moisture Interventions: Apply protective barrier, creams and emollients,Minimize layers,Moisture barrier,Internal/External urinary devices    Activity Interventions: Pressure redistribution bed/mattress(bed type)    Mobility Interventions: Float heels,HOB 30 degrees or less,Pressure redistribution bed/mattress (bed type),Turn and reposition approx.  every two hours(pillow and wedges)    Nutrition Interventions: Document food/fluid/supplement intake    Friction and Shear Interventions: Minimize layers,Feet elevated on foot rest                Problem: Patient Education: Go to Patient Education Activity  Goal: Patient/Family Education  Outcome: Progressing Towards Goal     Problem: Impaired Skin Integrity/Pressure Injury Treatment  Goal: *Improvement of Existing Pressure Injury  Outcome: Progressing Towards Goal  Goal: *Prevention of pressure injury  Description: Document Shakir Scale and appropriate interventions in the flowsheet. Outcome: Progressing Towards Goal  Note: Pressure Injury Interventions:  Sensory Interventions: Float heels,Minimize linen layers,Monitor skin under medical devices,Turn and reposition approx. every two hours (pillows and wedges if needed),Suspension boots    Moisture Interventions: Apply protective barrier, creams and emollients,Minimize layers,Moisture barrier,Internal/External urinary devices    Activity Interventions: Pressure redistribution bed/mattress(bed type)    Mobility Interventions: Float heels,HOB 30 degrees or less,Pressure redistribution bed/mattress (bed type),Turn and reposition approx. every two hours(pillow and wedges)    Nutrition Interventions: Document food/fluid/supplement intake    Friction and Shear Interventions: Minimize layers,Feet elevated on foot rest                Problem: Patient Education: Go to Patient Education Activity  Goal: Patient/Family Education  Outcome: Progressing Towards Goal     Problem: Diabetes Self-Management  Goal: *Disease process and treatment process  Description: Define diabetes and identify own type of diabetes; list 3 options for treating diabetes. Outcome: Progressing Towards Goal  Goal: *Incorporating nutritional management into lifestyle  Description: Describe effect of type, amount and timing of food on blood glucose; list 3 methods for planning meals. Outcome: Progressing Towards Goal  Goal: *Incorporating physical activity into lifestyle  Description: State effect of exercise on blood glucose levels. Outcome: Progressing Towards Goal  Goal: *Developing strategies to promote health/change behavior  Description: Define the ABC's of diabetes; identify appropriate screenings, schedule and personal plan for screenings.   Outcome: Progressing Towards Goal  Goal: *Using medications safely  Description: State effect of diabetes medications on diabetes; name diabetes medication taking, action and side effects. Outcome: Progressing Towards Goal  Goal: *Monitoring blood glucose, interpreting and using results  Description: Identify recommended blood glucose targets  and personal targets. Outcome: Progressing Towards Goal  Goal: *Prevention, detection, treatment of acute complications  Description: List symptoms of hyper- and hypoglycemia; describe how to treat low blood sugar and actions for lowering  high blood glucose level. Outcome: Progressing Towards Goal  Goal: *Prevention, detection and treatment of chronic complications  Description: Define the natural course of diabetes and describe the relationship of blood glucose levels to long term complications of diabetes. Outcome: Progressing Towards Goal  Goal: *Developing strategies to address psychosocial issues  Description: Describe feelings about living with diabetes; identify support needed and support network  Outcome: Progressing Towards Goal  Goal: *Insulin pump training  Outcome: Progressing Towards Goal  Goal: *Sick day guidelines  Outcome: Progressing Towards Goal  Goal: *Patient Specific Goal (EDIT GOAL, INSERT TEXT)  Outcome: Progressing Towards Goal     Problem: Patient Education: Go to Patient Education Activity  Goal: Patient/Family Education  Outcome: Progressing Towards Goal     Problem: Risk for Spread of Infection  Goal: Prevent transmission of infectious organism to others  Description: Prevent the transmission of infectious organisms to other patients, staff members, and visitors.   Outcome: Progressing Towards Goal     Problem: Patient Education:  Go to Education Activity  Goal: Patient/Family Education  Outcome: Progressing Towards Goal     Problem: Ventilator Management  Goal: *Adequate oxygenation and ventilation  Outcome: Progressing Towards Goal  Goal: *Patient maintains clear airway/free of aspiration  Outcome: Progressing Towards Goal  Goal: *Absence of infection signs and symptoms  Outcome: Progressing Towards Goal  Goal: *Normal spontaneous ventilation  Outcome: Progressing Towards Goal     Problem: Patient Education: Go to Patient Education Activity  Goal: Patient/Family Education  Outcome: Progressing Towards Goal     Problem: Discharge Planning  Goal: *Discharge to safe environment  Outcome: Progressing Towards Goal  Goal: *Knowledge of medication management  Outcome: Progressing Towards Goal  Goal: *Knowledge of discharge instructions  Outcome: Progressing Towards Goal     Problem: Patient Education: Go to Patient Education Activity  Goal: Patient/Family Education  Outcome: Progressing Towards Goal

## 2022-06-01 NOTE — PROGRESS NOTES
Surgery critical care Progress Note     Subjective:   Patient examined in ICU. Hospital Course: Patient examined ICU. Patient currently off, Levophed drip  Hemodynamically stable. There is no more episodes of bradycardia. Subjective:   Unable to assess        Review of Systems  Unable to assess      Objective:      Visit Vitals  /60 (BP 1 Location: Left upper arm, BP Patient Position: At rest)   Pulse 82   Temp 97.9 °F (36.6 °C)   Resp 15   Ht 5' 6.93\" (1.7 m)   Wt 186 lb 4.6 oz (84.5 kg)   SpO2 100%   Breastfeeding No   BMI 29.24 kg/m²       Patient is intubated and sedated. Head and neck atraumatic, normocephalic. ENT: No hoarse voice  Cardiac system regular rate rhythm. Pulmonary no audible wheeze  Chest wall excursion normal with respiration cycle  Abdomen is soft not particularly distended. Neurologically nonfocal.  Skin is warm and moist.  Psychosocial: Cooperative. Vascular examination as previously noted no changes. Data Review reviewed. Assessment / Plan:  Overall patient's hemodynamics seems to be stable. Her white blood cell count has been rising. I personally did not look at the sacral wound today. However left foot wound looks clean and dry there is no signs of ischemia I redressed the wound on the left foot with Xeroform gauze and Kerlix roll. Patient's mother has been stable. I think is reasonable to offer patient and family loop colostomy placement. I did talk to patient's 2 daughter about this. And they are agreeable. And once procedure completed we will need to work on extubation trial as soon as possible. Patient's liver enzymes normal.  We will check a PT/INR level. And the patient's hemoglobin is low.   We will provide her 2 units packed red blood cell         Critical care time spent 30minutes involving direct patient care as well as reviewing patient's labs and coordination of care with nursing staff     Care Plan discussed with: Patient/Family/RN/Case Management           Total time spent with patient: 30 minutes.

## 2022-06-01 NOTE — PROGRESS NOTES
IMPRESSION:   1. Acute hypoxic respiratory failure remains on the ventilator  2. Sacral decubiti ulcer with Acinetobacter and Enterococcus  3. Severe sepsis with septic shock resolved on no pressors  4. Left foot wound  5. Transaminitis continue to improve  6. Shock liver  7. Hypokalemia repleted  8. Symptomatic anemia stable after transfusion  9. Cardiomyopathy  10. Mild pulmonary edema      RECOMMENDATIONS/PLAN:   1. ICU monitoring  1. Ventilator for mechanical life support and prevent respiratory arrest with protective lung strategies will decrease the rate she is on 21% FiO2 ABG acceptable we will start weaning change vent setting to spontaneous when off sedation  2. Possible surgery next we will keep on ventilator and also she is hemodynamically unstable on Levophed  3. Pending loop colostomy possible surgery which has been postponed till next week  4. ET tube is 3.5 cm above the jennifer will advise 1 cm and repeat chest x-ray which has been corrected  5. Liver enzymes improved   6. Hemoglobin trending down we will continue to monitor  7. Patient underwent on 5/23 left metatarsal amputation and sacral wound debridement  8. Severe sepsis with decubiti ulcer   9. Patient is on Unasyn  10. Chest x-ray shows mild congestive changes with fluid in the minor fissure  11.  Anemia hemoglobin improved after transfusion 5/20     [x] High complexity decision making was performed  [x] See my orders for details  HPI   82-year-old lady came in because of leg pain past medical history of hypertension diabetes mellitus peripheral vascular disease CVA she is bedbound she has leg wound and also has sacral decubiti ulcer and she was scheduled for laparoscopic colostomy and sacral wound debridement and amputation of the left tarsometatarsal because of wound infection but her condition got worse her liver enzyme was elevated INR was also elevated she was intubated transferred to ICU was getting vancomycin and Unasyn started on Levophed because of low blood pressure. PMH:  has a past medical history of Anemia, Chronic kidney disease, Diabetes mellitus ([address] Hemiplegia affecting dominant side, post-stroke ([facility] [address] (05/04/2022), HTN (hypertension), Hyperkalemia, Hyperlipidemia, Ill-defined condition, Neuropathy, PAD (peripheral artery disease) ([address] Sciatica, Stroke ([address] and UTI (urinary tract infection). PSH:   has a past surgical history that includes hx other surgical (Bilateral); hx amputation (Right); hx other surgical; hx cervical fusion; hx vascular stent (Bilateral); hx vascular stent (Bilateral); hx gi; and ir insert non tunl cvc over 5 yrs (5/27/2022). FHX: family history includes Cancer in her mother; Diabetes in her mother; Hypertension in her mother. SHX:  reports that she has never smoked. She has never used smokeless tobacco. She reports previous alcohol use. She reports that she does not use drugs.     ALL: No Known Allergies     MEDS:   [x] Reviewed - As Below   [] Not reviewed    Current Facility-Administered Medications   Medication    0.9% sodium chloride infusion 250 mL    [START ON 6/2/2022] Vancomycin random level to be drawn on 6/2/22 at 0400 am.    0.9% sodium chloride infusion 250 mL    fluconazole (DIFLUCAN) 200mg/100 mL IVPB (premix)    aspirin chewable tablet 81 mg    NOREPINephrine (LEVOPHED) 8 mg in 0.9% NS 250ml infusion    sodium chloride (NS) flush 5-40 mL    sodium chloride (NS) flush 5-40 mL    0.9% sodium chloride infusion 250 mL    furosemide (LASIX) injection 20 mg    sodium chloride (NS) flush 5-40 mL    ondansetron (ZOFRAN) injection 4 mg    enoxaparin (LOVENOX) injection 40 mg    dextrose 5% infusion    insulin lispro (HUMALOG) injection    glucose chewable tablet 16 g    glucagon (GLUCAGEN) injection 1 mg    dextrose 10% infusion 0-250 mL    hydrALAZINE (APRESOLINE) tablet 12.5 mg    sacubitriL-valsartan (ENTRESTO) 24-26 mg tablet 1 Tablet    metoprolol succinate (TOPROL-XL) XL tablet 25 mg    ampicillin-sulbactam (UNASYN) 3 g in 0.9% sodium chloride (MBP/ADV) 100 mL MBP    dextrose 10% infusion 0-250 mL    insulin glargine (LANTUS) injection 10 Units    propofol (DIPRIVAN) 10 mg/mL infusion    0.9% sodium chloride infusion 250 mL    sodium hypochlorite (HALF STRENGTH DAKIN'S) 0.25% irrigation (bottle)    Vancomycin Pharmacy Dosing    acetaminophen (TYLENOL) tablet 650 mg    Or    acetaminophen (TYLENOL) suppository 650 mg    polyethylene glycol (MIRALAX) packet 17 g    ondansetron (ZOFRAN ODT) tablet 4 mg    famotidine (PEPCID) tablet 20 mg    acidophilus-pectin, citrus tablet 2 Tablet    albuterol-ipratropium (DUO-NEB) 2.5 MG-0.5 MG/3 ML    artificial saliva (MOUTH KOTE) 1 Spray    brimonidine (ALPHAGAN) 0.2 % ophthalmic solution 1 Drop    cholestyramine-aspartame (QUESTRAN LIGHT) packet 4 g    [Held by provider] fenofibrate nanocrystallized (TRICOR) tablet 48 mg    ferrous sulfate tablet 325 mg    gabapentin (NEURONTIN) capsule 300 mg    insulin glargine (LANTUS) injection 50 Units    latanoprost (XALATAN) 0.005 % ophthalmic solution 1 Drop    traMADoL (ULTRAM) tablet 25 mg      MAR reviewed and pertinent medications noted or modified as needed   Current Facility-Administered Medications   Medication    0.9% sodium chloride infusion 250 mL    [START ON 6/2/2022] Vancomycin random level to be drawn on 6/2/22 at 0400 am.    0.9% sodium chloride infusion 250 mL    fluconazole (DIFLUCAN) 200mg/100 mL IVPB (premix)    aspirin chewable tablet 81 mg    NOREPINephrine (LEVOPHED) 8 mg in 0.9% NS 250ml infusion    sodium chloride (NS) flush 5-40 mL    sodium chloride (NS) flush 5-40 mL    0.9% sodium chloride infusion 250 mL    furosemide (LASIX) injection 20 mg    sodium chloride (NS) flush 5-40 mL    ondansetron (ZOFRAN) injection 4 mg    enoxaparin (LOVENOX) injection 40 mg    dextrose 5% infusion    insulin lispro (HUMALOG) injection    glucose chewable tablet 16 g    glucagon (GLUCAGEN) injection 1 mg    dextrose 10% infusion 0-250 mL    hydrALAZINE (APRESOLINE) tablet 12.5 mg    sacubitriL-valsartan (ENTRESTO) 24-26 mg tablet 1 Tablet    metoprolol succinate (TOPROL-XL) XL tablet 25 mg    ampicillin-sulbactam (UNASYN) 3 g in 0.9% sodium chloride (MBP/ADV) 100 mL MBP    dextrose 10% infusion 0-250 mL    insulin glargine (LANTUS) injection 10 Units    propofol (DIPRIVAN) 10 mg/mL infusion    0.9% sodium chloride infusion 250 mL    sodium hypochlorite (HALF STRENGTH DAKIN'S) 0.25% irrigation (bottle)    Vancomycin Pharmacy Dosing    acetaminophen (TYLENOL) tablet 650 mg    Or    acetaminophen (TYLENOL) suppository 650 mg    polyethylene glycol (MIRALAX) packet 17 g    ondansetron (ZOFRAN ODT) tablet 4 mg    famotidine (PEPCID) tablet 20 mg    acidophilus-pectin, citrus tablet 2 Tablet    albuterol-ipratropium (DUO-NEB) 2.5 MG-0.5 MG/3 ML    artificial saliva (MOUTH KOTE) 1 Spray    brimonidine (ALPHAGAN) 0.2 % ophthalmic solution 1 Drop    cholestyramine-aspartame (QUESTRAN LIGHT) packet 4 g    [Held by provider] fenofibrate nanocrystallized (TRICOR) tablet 48 mg    ferrous sulfate tablet 325 mg    gabapentin (NEURONTIN) capsule 300 mg    insulin glargine (LANTUS) injection 50 Units    latanoprost (XALATAN) 0.005 % ophthalmic solution 1 Drop    traMADoL (ULTRAM) tablet 25 mg      PMH:  has a past medical history of Anemia, Chronic kidney disease, Diabetes mellitus ([address] Hemiplegia affecting dominant side, post-stroke ([address] (05/04/2022), HTN (hypertension), Hyperkalemia, Hyperlipidemia, Ill-defined condition, Neuropathy, PAD (peripheral artery disease) ([address] Sciatica, Stroke ([address] and UTI (urinary tract infection).   PSH:   has a past surgical history that includes hx other surgical (Bilateral); hx amputation (Right); hx other surgical; hx cervical fusion; hx vascular stent (Bilateral); hx vascular stent (Bilateral); hx gi; and ir insert non tunl cvc over 5 yrs (2022). FHX: family history includes Cancer in her mother; Diabetes in her mother; Hypertension in her mother. SHX:  reports that she has never smoked. She has never used smokeless tobacco. She reports previous alcohol use. She reports that she does not use drugs. ROS:  Unable to obtain intubated on ventilator    Hemodynamics:    CO:    CI:    CVP:    SVR:   PAP Systolic:    PAP Diastolic:    PVR:    MG11:        Ventilator Settings:      Mode Rate TV Press PEEP FiO2 PIP Min. Vent   Assist control,Volume control    500 ml    5 cm H20 21 %  25 cm H2O  5.43 l/min        Vital Signs: Telemetry:    normal sinus rhythm Intake/Output:   Visit Vitals  BP (!) 125/57   Pulse 76   Temp 98.8 °F (37.1 °C)   Resp 11   Ht 5' 6.93\" (1.7 m)   Wt 84.5 kg (186 lb 4.6 oz)   SpO2 100%   Breastfeeding No   BMI 29.24 kg/m²       Temp (24hrs), Av.1 °F (36.7 °C), Min:97.3 °F (36.3 °C), Max:98.8 °F (37.1 °C)        O2 Device: Ventilator O2 Flow Rate (L/min): 1 l/min       Wt Readings from Last 4 Encounters:   22 84.5 kg (186 lb 4.6 oz)   04/10/22 86.6 kg (191 lb)   22 82.2 kg (181 lb 3.5 oz)   20 84.4 kg (186 lb)          Intake/Output Summary (Last 24 hours) at 2022 0855  Last data filed at 2022 2300  Gross per 24 hour   Intake 2114.64 ml   Output 1550 ml   Net 564.64 ml       Last shift:      No intake/output data recorded. Last 3 shifts: 1 -  0700  In: 4669.9 [I.V.:1615.3]  Out: 3025 [Urine:2250; Drains:775]       Physical Exam:     General: intubated on vent unresponsive on propofol  HEENT: NCAT,  Eyes: anicteric; conjunctiva clear doll's eye reflex present  Neck: no nodes, no cuff leak, trach midline; no accessory MM use.   Neck veins obscured by obesity but seems slightly engorged  Chest: no deformity,   Cardiac: R regular; no murmur;   Lungs: distant breath sounds; no wheezes rales in the bases  Abd: soft, NT, hypoactive BS  Ext: Ace edema; no joint swelling;  No clubbing  :clear urine  Neuro: Positive on propofol doll's eye reflex present flaccid extremities  Psych-  unable to assess  Skin: warm, dry, no cyanosis;   Pulses: Brachial and radial pulses intact  Capillary: Normal capillary refill      DATA:    MAR reviewed and pertinent medications noted or modified as needed  MEDS:   Current Facility-Administered Medications   Medication    0.9% sodium chloride infusion 250 mL    [START ON 6/2/2022] Vancomycin random level to be drawn on 6/2/22 at 0400 am.    0.9% sodium chloride infusion 250 mL    fluconazole (DIFLUCAN) 200mg/100 mL IVPB (premix)    aspirin chewable tablet 81 mg    NOREPINephrine (LEVOPHED) 8 mg in 0.9% NS 250ml infusion    sodium chloride (NS) flush 5-40 mL    sodium chloride (NS) flush 5-40 mL    0.9% sodium chloride infusion 250 mL    furosemide (LASIX) injection 20 mg    sodium chloride (NS) flush 5-40 mL    ondansetron (ZOFRAN) injection 4 mg    enoxaparin (LOVENOX) injection 40 mg    dextrose 5% infusion    insulin lispro (HUMALOG) injection    glucose chewable tablet 16 g    glucagon (GLUCAGEN) injection 1 mg    dextrose 10% infusion 0-250 mL    hydrALAZINE (APRESOLINE) tablet 12.5 mg    sacubitriL-valsartan (ENTRESTO) 24-26 mg tablet 1 Tablet    metoprolol succinate (TOPROL-XL) XL tablet 25 mg    ampicillin-sulbactam (UNASYN) 3 g in 0.9% sodium chloride (MBP/ADV) 100 mL MBP    dextrose 10% infusion 0-250 mL    insulin glargine (LANTUS) injection 10 Units    propofol (DIPRIVAN) 10 mg/mL infusion    0.9% sodium chloride infusion 250 mL    sodium hypochlorite (HALF STRENGTH DAKIN'S) 0.25% irrigation (bottle)    Vancomycin Pharmacy Dosing    acetaminophen (TYLENOL) tablet 650 mg    Or    acetaminophen (TYLENOL) suppository 650 mg    polyethylene glycol (MIRALAX) packet 17 g    ondansetron (ZOFRAN ODT) tablet 4 mg    famotidine (PEPCID) tablet 20 mg    acidophilus-pectin, citrus tablet 2 Tablet    albuterol-ipratropium (DUO-NEB) 2.5 MG-0.5 MG/3 ML    artificial saliva (MOUTH KOTE) 1 Spray    brimonidine (ALPHAGAN) 0.2 % ophthalmic solution 1 Drop    cholestyramine-aspartame (QUESTRAN LIGHT) packet 4 g    [Held by provider] fenofibrate nanocrystallized (TRICOR) tablet 48 mg    ferrous sulfate tablet 325 mg    gabapentin (NEURONTIN) capsule 300 mg    insulin glargine (LANTUS) injection 50 Units    latanoprost (XALATAN) 0.005 % ophthalmic solution 1 Drop    traMADoL (ULTRAM) tablet 25 mg        Labs:    Recent Labs     22  0600 225 22  0247   WBC  --  14.7* 14.3* 13.5*   HGB  --  8.5* 6.7* 7.8*   PLT  --  361 381 374   INR 1.5*  --   --   --      Recent Labs     22  0247    141 140   K 3.6 3.4* 3.3*   * 115* 113*   CO2 22 18* 18*   * 150* 209*   BUN 27* 29* 25*   CREA 0.77 0.80 0.77   CA 8.5 7.8* 8.5   MG 1.8  --  1.4*   PHOS 3.1  --  2.8   ALB 1.5* 1.4* 1.4*   ALT 54 55 70     Recent Labs     22  0435 22  0340 22  0425   PH 7.44 7.37 7.39   PCO2 29* 31* 29*   PO2 79 123* 86   HCO3 22 19* 18*   FIO2 21.0 21.0 21.0    AC 12  PEEP 5 FiO2 30%   echo ejection fraction 20% mild mitral regurg left atrium 3.5 cm RVSP 35  , 1313, 1361    Lab Results   Component Value Date/Time    Culture result: Heavy  Mixed skin yasmine isolated   2022 06:15 PM    Culture result: Rare Normal respiratory yasmine 2022 02:48 PM    Culture result: Heavy Acinetobacter baumannii complex 05/15/2022 07:15 PM    Culture result: Heavy Diphtheroids 05/15/2022 07:15 PM     Lab Results   Component Value Date/Time    TSH 2.880 2016 10:13 AM        Imagin/22 chest x-ray with ET tube in place hazy accentuated basilar markings with small amount of fluid in the minor fissure  Results from Hospital Encounter encounter on 22    XR CHEST PORT    Narrative  Chest single view.    Comparison single view chest May 31, 2022. Stable ET tube and right neck CVC. Unchanged appearance for the lungs; no interstitial or alveolar pulmonary edema. Cardiac and mediastinal structures magnified on this AP view. No pneumothorax or  pleural effusion. Results from [place] encounter on 05/14/22    CT ABD PELV WO CONT    Narrative  CT ABD PELV WO CONT    Technique: Noncontrasted CT scan of the abdomen and pelvis was performed  utilizing transaxial images obtained from the dome of the diaphragm to the pubic  symphysis. Coronal and sagittal reconstructions were generated. Dose Reduction:  All CT scans at this facility are performed using dose reduction optimization  techniques as appropriate to a performed exam including the following: Automated  exposure control, adjustments of the mA and/or kV according to patient size, or  use of iterative reconstruction technique. Comparison:  2/25/2022. Findings:  Small-moderate pleural effusions are present with bibasilar  atelectasis. Atherosclerotic calcifications are again evident including coronary  artery calcifications. Gastrostomy tube tip in the gastric antrum. The liver, spleen, pancreas, and  adrenal glands are unremarkable for noncontrasted technique. Stable lobulated  kidneys. Negative for hydronephrosis. Gallbladder is unremarkable. No biliary  duct dilatation apparent. The abdominal aorta is normal in caliber. There is no evidence of bowel obstruction. Small volume ascites has developed. The urinary bladder is decompressed by a Bell catheter. The uterus is  unremarkable. The appendix is normal.    There is mild body wall edema. No suspicious lytic or blastic lesion evident. Impression  Third spacing of fluids with small volume ascites, small-moderate  pleural effusions, and mild body wall edema. Bibasilar atelectasis.       5/20 intubated on ventilator we will continue current vent settings patient is supposed to go for surgery because of transaminitis elevated liver enzyme we will wait as per surgeon for sacral wound debridement and diverting loop colostomy off Levophed, improvement in AST and ALT  5/21 continue with the current vent settings ABG acceptable liver enzyme improving awaiting for the surgery  5/22 maintain ventilator as is in anticipation of surgery.   Renal function improved mild congestion on chest x-ray we will give 1 dose Lasix and potassium  5/23 patient is going for surgery today we will leave ventilator as it is INR is 1.5  5/25 patient remained on ventilator intubated doing well, patient received vitamin K fresh frozen plasma schedule for laparoscopic loop colostomy possible today  5/26 no surgery has been plans will start weaning do sedation vacation  5/27 try to do sedation vacation to wean patient blood pressure dropped still on Levophed we will continue to wean discussed with the family at bedside  5/28 remains on ventilator sedated and also on Levophed possible surgery next week  5/29 on ventilator ET tube advisement 1 to 1.5 cm we will repeat chest x-ray ABG acceptable  5/30 continue with current vent setting  5/31 awaiting for possible surgery if not we will start weaning  6/1 remain intubated on ventilator surgery has been delayed we will start weaning and plan for extubation  Time of care 30 minutes

## 2022-06-01 NOTE — PROGRESS NOTES
Vancomycin Dosing Consult  Day # 18  of vancomycin therapy  Consult ordered by Dr. Jeanmarie Benton / Dr. Cornell Rausch for this 78 y.o. female for indication of decubitus ulcer infection, sepsis. Antibiotic regimen: Vancomycin + Unasyn  + Fluconazole    Temp (24hrs), Av.1 °F (36.7 °C), Min:97.3 °F (36.3 °C), Max:98.8 °F (37.1 °C)    Recent Labs     22  0247   WBC 14.7* 14.3* 13.5*     Recent Labs     22  0247   CREA 0.77 0.80 0.77   BUN 27* 29* 25*     Recent Labs     22  0247   CRP 14.60* 11.40* 12.60*     Recent Labs     22  0247   PCT 0.26* 0.26* 0.24*       Estimated Creatinine Clearance: 70.4 mL/min (based on SCr of 0.77 mg/dL). ml/min  Concomitant nephrotoxic drugs: Norepinephrine     Cultures:    Wound: Moderate MDR Pseudomonas aeruginosa susceptible to Zerbaxa, aminoglycosides), moderate mixed skin yasmine, moderate mixed enteric yasmine including yeast, final   Blood: CoNS in the anaerobic bottle, final   Wound, ulcer:  Moderate Acinetobacter baumannii, moderate Enterococcus faecium, light >2 organisms (contraindicated), final  5/15 Wound, great toe: Heavy Acinetobacter baumannii complex (Zosyn resistant, susceptible to Unasyn), heavy Diphtheroids, final   Tissue: pending  MRSA Swab: Not ordered, patient already received first dose of vancomycin    Target range: Trough 10-15 mcg/mL     Recent level history:  Date/Time Dose & Interval Measured Level (mcg/mL)   528     750mg    13.4       1000mg @ 1519    12.4          750mg @ 1200    17.7    No dose  today  19.8     Wt Readings from Last 1 Encounters:   22 84.5 kg (186 lb 4.6 oz)   Ideal body weight: 61.4 kg (135 lb 7.1 oz)  Adjusted ideal body weight: 70.7 kg (155 lb 12.5 oz)      Assessment/Plan:   Afebrile , Sepsis with leukocytosis elevated procalcitonin and CRP  CKD,  Scr trending up  Level  today is at 19.8. No  vancomycin  dose today.    A random level has been scheduled for 6/2/22 at  0400 am.  Pharmacy to continue to monitor level and adjust   Antimicrobial stop date TBD

## 2022-06-02 NOTE — PROGRESS NOTES
Spiritual Care Assessment/Progress Note  [facility]      NAME: Radha Richey      MRN: [id number]  AGE: 78 y.o. SEX: female  Amish Affiliation: Islam   Language: English     6/2/2022     Total Time (in minutes): 11     Spiritual Assessment begun in [address] ICU through conversation with:         [x]Patient        [x] Family    [] Friend(s)        Reason for Consult: Initial visit     Spiritual beliefs: (Please include comment if needed)     [] Identifies with a lesley tradition:         [] Supported by a lesley community:            [] Claims no spiritual orientation:           [] Seeking spiritual identity:                [] Adheres to an individual form of spirituality:           [x] Not able to assess:                           Identified resources for coping:      [] Prayer                               [] Music                  [] Guided Imagery     [] Family/friends                 [] Pet visits     [] Devotional reading                         [x] Unknown     [] Other:                                               Interventions offered during this visit: (See comments for more details)    Patient Interventions: Initial visit,Other (comment) (Silent support and prayer)           Plan of Care:     [] Support spiritual and/or cultural needs    [] Support AMD and/or advance care planning process      [] Support grieving process   [] Coordinate Rites and/or Rituals    [] Coordination with community clergy   [] No spiritual needs identified at this time   [] Detailed Plan of Care below (See Comments)  [] Make referral to Music Therapy  [] Make referral to Pet Therapy     [] Make referral to Addiction services  [] Make referral to [facility]  [] Make referral to Spiritual Care Partner  [] No future visits requested        [x] Contact Spiritual Care for further referrals     Comments:  Visited patient in the ICU unit for initial assessment.   Medical staff were providing care for the patient during the visit. One visitor was in the room on the phone. Provided silent support and prayer. Contact chaplains for further spiritual care and support. Chaplain Boston Shine M.Div.    can be reached by calling the  at [facility]  [phone]

## 2022-06-02 NOTE — PROGRESS NOTES
Subjective: Remains intubated and no longer requiring pressor support. Will attempt to extubate in the am. Surgery was cancelled as overall prognosis is poor. INPATIENT MEDICATIONS:  Home medications reviewed.     Current Facility-Administered Medications:     glycopyrrolate (ROBINUL) injection 0.1 mg, 0.1 mg, IntraVENous, TID, Martínez Crespo MD, 0.1 mg at 06/02/22 0952    [START ON 6/4/2022] Vancomycin random level to be drawn on 6/4/22 at 0400 am., , Other, Aleida Lemus MD    0.9% sodium chloride infusion 250 mL, 250 mL, IntraVENous, PRN, Lyle Bob MD    0.9% sodium chloride infusion 250 mL, 250 mL, IntraVENous, PRN, Foreign Soto MD    fluconazole (DIFLUCAN) 200mg/100 mL IVPB (premix), 200 mg, IntraVENous, Q24H, Adina Smart MD, Last Rate: 100 mL/hr at 06/02/22 0952, 200 mg at 06/02/22 7836    aspirin chewable tablet 81 mg, 81 mg, Per G Tube, DAILY, Israel Soto MD, 81 mg at 06/02/22 0952    NOREPINephrine (LEVOPHED) 8 mg in 0.9% NS 250ml infusion, 0.5-16 mcg/min, IntraVENous, TITRATE, Israel Soto MD, Paused at 05/29/22 2046    sodium chloride (NS) flush 5-40 mL, 5-40 mL, IntraVENous, Q8H, Mynor Matthew MD, 10 mL at 06/02/22 0543    sodium chloride (NS) flush 5-40 mL, 5-40 mL, IntraVENous, PRN, Jodie Mcelroy MD, 10 mL at 05/31/22 2213    0.9% sodium chloride infusion 250 mL, 250 mL, IntraVENous, PRN, Lyle Bob MD    furosemide (LASIX) injection 20 mg, 20 mg, IntraVENous, Q12H, Lyle Bob MD, 20 mg at 06/02/22 0953    [DISCONTINUED] ondansetron (ZOFRAN ODT) tablet 4 mg, 4 mg, Oral, Q8H PRN **OR** ondansetron (ZOFRAN) injection 4 mg, 4 mg, IntraVENous, Q6H PRN, Lyle Bob MD    enoxaparin (LOVENOX) injection 40 mg, 40 mg, SubCUTAneous, DAILY, Lyle Bob MD, 40 mg at 06/02/22 0951    dextrose 5% infusion, 30 mL/hr, IntraVENous, CONTINUOUS, Kalpesh Lara MD, Last Rate: 30 mL/hr at 06/01/22 1123, 30 mL/hr at 06/01/22 1123    insulin lispro (HUMALOG) injection, , SubCUTAneous, Q6H, Rosales Bob MD, 1 Units at 06/02/22 1214    glucose chewable tablet 16 g, 4 Tablet, Oral, PRN, Rosales Bob MD    glucagon [facility]) injection 1 mg, 1 mg, IntraMUSCular, PRN, Rosaels Bob MD    dextrose 10% infusion 0-250 mL, 0-250 mL, IntraVENous, PRN, Rosales Bob MD, 250 mL at 06/01/22 1217    hydrALAZINE (APRESOLINE) tablet 12.5 mg, 12.5 mg, Oral, TID, Rosales Bob MD, 12.5 mg at 06/02/22 8846    sacubitriL-valsartan (ENTRESTO) 24-26 mg tablet 1 Tablet, 1 Tablet, Oral, Q12H, Rosales Bob MD, 1 Tablet at 06/02/22 2336    metoprolol succinate (TOPROL-XL) XL tablet 25 mg, 25 mg, Oral, DAILY, Rosales Bob MD, 25 mg at 05/30/22 0919    ampicillin-sulbactam (UNASYN) 3 g in 0.9% sodium chloride (MBP/ADV) 100 mL MBP, 3 g, IntraVENous, Q8H, Rosales Bob MD, Last Rate: 200 mL/hr at 06/02/22 0543, 3 g at 06/02/22 0543    dextrose 10% infusion 0-250 mL, 0-250 mL, IntraVENous, PRN, Rosales Bob MD, 125 mL at 05/19/22 0537    insulin glargine (LANTUS) injection 10 Units, 10 Units, SubCUTAneous, QHS, Rosales Bob MD, 10 Units at 06/01/22 2200    propofol (DIPRIVAN) 10 mg/mL infusion, 0-50 mcg/kg/min, IntraVENous, TITRATE, Rosales Bob MD, Stopped at 06/02/22 0730    0.9% sodium chloride infusion 250 mL, 250 mL, IntraVENous, PRN, Rosales Bob MD, Last Rate: 15 mL/hr at 05/22/22 0820, Rate Verify at 05/22/22 0820    sodium hypochlorite (HALF STRENGTH DAKIN'S) 0.25% irrigation (bottle), , Topical, BID, Rosales Bob MD, Given at 06/01/22 2202    Vancomycin Pharmacy Dosing, , Other, Rx Dosing/Monitoring, Rosales Bob MD    acetaminophen (TYLENOL) tablet 650 mg, 650 mg, Oral, Q6H PRN, 650 mg at 05/22/22 0556 **OR** acetaminophen (TYLENOL) suppository 650 mg, 650 mg, Rectal, Q6H PRN, Rosales Bob MD, 650 mg at 05/16/22 2223    polyethylene glycol (MIRALAX) packet 17 g, 17 g, Oral, DAILY PRN, Rosales Bob, MD    ondansetron (ZOFRAN ODT) tablet 4 mg, 4 mg, Oral, Q8H PRN **OR** [DISCONTINUED] ondansetron (ZOFRAN) injection 4 mg, 4 mg, IntraVENous, Q6H PRN, Mouna Bob MD    famotidine (PEPCID) tablet 20 mg, 20 mg, Oral, BID, Mouna Bob MD, 20 mg at 06/02/22 8501    acidophilus-pectin, citrus tablet 2 Tablet, 2 Tablet, Oral, DAILY, Mouna Bob MD, 2 Tablet at 06/02/22 0952    albuterol-ipratropium (DUO-NEB) 2.5 MG-0.5 MG/3 ML, 3 mL, Nebulization, Q6H PRN, Mouna Bob MD    artificial saliva (MOUTH KOTE) 1 Spray, 1 Spray, Oral, TID, Mouna Bob MD, 1 [facility] at 06/01/22 2203    brimonidine (ALPHAGAN) 0.2 % ophthalmic solution 1 Drop, 1 Drop, Both Eyes, TID, Mouna Bob MD, 1 Drop at 06/02/22 0953    cholestyramine-aspartame (QUESTRAN LIGHT) packet 4 g, 4 g, Oral, BID WITH MEALS, Mouna Bob MD, 4 g at 06/02/22 3834    [Held by provider] fenofibrate nanocrystallized (TRICOR) tablet 48 mg, 48 mg, Oral, DAILY, Mouna Bob MD, 48 mg at 05/19/22 0295    ferrous sulfate tablet 325 mg, 325 mg, Oral, BID, Mouna Bob MD, 325 mg at 06/02/22 9147    gabapentin (NEURONTIN) capsule 300 mg, 300 mg, Oral, BID, Mouna Bob MD, 300 mg at 06/02/22 3080    insulin glargine (LANTUS) injection 50 Units, 50 Units, SubCUTAneous, DAILY, Mouna Bob MD, 50 Units at 06/02/22 0951    latanoprost (XALATAN) 0.005 % ophthalmic solution 1 Drop, 1 Drop, Right Eye, QPM, Mouna Bob MD, 1 Drop at 06/01/22 1702    traMADoL (ULTRAM) tablet 25 mg, 25 mg, Oral, Q12H PRN, Paulino, Mouna Amos MD, 25 mg at 05/18/22 0056       REVIEW OF SYSTEMS:  Intubated. Physical Exam:   General: Chronically ill appearing female lying in bed intubated/sedated, NAD  HEENT: Normocephalic, PERRL, no drainage  Neck: Supple, Trachea midline, No JVD  RESP: Coarse globally with diminished lower lobes. + Symmetrical chest movement. 30% FiO2. Intubated/Ventilated. Cardiovascular: RRR no RG. + murmur.   PVS: No rubor, cyanosis, mild pitting BLE edema, Radial, DP, PT pulses equal bilaterally  ABD: obese, soft, NT, Normoactive BS  Derm: +arterial line,+sacral wound vac  :+giraldo catheter  Neuro: sedation off  PSYCH: No anxiety or agitation    Visit Vitals  BP (!) 162/61 (BP 1 Location: Left upper arm, BP Patient Position: Semi fowlers;Supine)   Pulse 87   Temp 99 °F (37.2 °C)   Resp 13   Ht 5' 6.93\" (1.7 m)   Wt 89.1 kg (196 lb 6.9 oz)   SpO2 100%   Breastfeeding No   BMI 30.83 kg/m²     Case was discussed with Dr. Vibah Montero and our impression and recommendations are as follows:     1. New onset cardiomyopathy:    - Echo 5/18/22 showing EF of 20-25% down from 50-55% in 4/2022.    - Resume HF medications, patient no longer on pressor support  -  Recommend ischemic evaluation once patient is clinically stable. Deferred at this time due to patient's acuity. - The patient is considered high risk for upcoming procedure. - Discussed the potential cardiac risks involved with surgical interventions with the patient's family and they have requested to proceed with the surgery.      2. Elevated troponin:    - HS troponin peaked at 890, currently 275. -  Hgb 10.2,s/p transfusion 2 units PRBCs    - Continue BB. -  Ischemic evaluation pending post- op clinical course.    - No statin given LFTs elevated likely due to shock.       3.   NSVT:   - No reoccurrence.    - Continue beta blocker.   - Maintain lytes.        Thank you for involving us in the care of this patient. Please do not hesitate to call with questions or concerns. Alesia Light NP  6/2/2022     [facility]  [address]    [address], [facility] [facility]  [phone]

## 2022-06-02 NOTE — PROGRESS NOTES
IMPRESSION:   1. Acute hypoxic respiratory failure remains on the ventilator  2. Sacral decubiti ulcer with Acinetobacter and Enterococcus  3. Severe sepsis with septic shock resolved on no pressors  4. Left foot wound  5. Transaminitis continue to improve  6. Shock liver  7. Hypokalemia repleted  8. Symptomatic anemia stable after transfusion  9. Cardiomyopathy  10. Mild pulmonary edema      RECOMMENDATIONS/PLAN:   1. ICU monitoring  1. Ventilator for mechanical life support and prevent respiratory arrest with protective lung strategies will decrease the rate she is on 21% FiO2 ABG acceptable we will stop sedation and change vent setting to spontaneous and will start weaning and she is off Levophed  2. Pending loop colostomy possible surgery which has been postponed till next week  3. ET tube is 3.5 cm above the jennifer will advise 1 cm and repeat chest x-ray which has been corrected  4. Liver enzymes improved   5. Hemoglobin trending down we will continue to monitor  6. Patient underwent on 5/23 left metatarsal amputation and sacral wound debridement  7. Severe sepsis with decubiti ulcer   8. Patient is on Unasyn  9. Chest x-ray shows mild congestive changes with fluid in the minor fissure  10. Anemia hemoglobin improved after transfusion 5/20     [x] High complexity decision making was performed  [x] See my orders for details  HPI   81-year-old lady came in because of leg pain past medical history of hypertension diabetes mellitus peripheral vascular disease CVA she is bedbound she has leg wound and also has sacral decubiti ulcer and she was scheduled for laparoscopic colostomy and sacral wound debridement and amputation of the left tarsometatarsal because of wound infection but her condition got worse her liver enzyme was elevated INR was also elevated she was intubated transferred to ICU was getting vancomycin and Unasyn started on Levophed because of low blood pressure.     PMH:  has a past medical history of Anemia, Chronic kidney disease, Diabetes mellitus ([facility] [address] Hemiplegia affecting dominant side, post-stroke ([facility] [address] (05/04/2022), HTN (hypertension), Hyperkalemia, Hyperlipidemia, Ill-defined condition, Neuropathy, PAD (peripheral artery disease) ([facility] [address] Sciatica, Stroke ([facility] [address] and UTI (urinary tract infection). PSH:   has a past surgical history that includes hx other surgical (Bilateral); hx amputation (Right); hx other surgical; hx cervical fusion; hx vascular stent (Bilateral); hx vascular stent (Bilateral); hx gi; and ir insert non tunl cvc over 5 yrs (5/27/2022). FHX: family history includes Cancer in her mother; Diabetes in her mother; Hypertension in her mother. SHX:  reports that she has never smoked. She has never used smokeless tobacco. She reports previous alcohol use. She reports that she does not use drugs.     ALL: No Known Allergies     MEDS:   [x] Reviewed - As Below   [] Not reviewed    Current Facility-Administered Medications   Medication    0.9% sodium chloride infusion 250 mL    0.9% sodium chloride infusion 250 mL    fluconazole (DIFLUCAN) 200mg/100 mL IVPB (premix)    aspirin chewable tablet 81 mg    NOREPINephrine (LEVOPHED) 8 mg in 0.9% NS 250ml infusion    sodium chloride (NS) flush 5-40 mL    sodium chloride (NS) flush 5-40 mL    0.9% sodium chloride infusion 250 mL    furosemide (LASIX) injection 20 mg    ondansetron (ZOFRAN) injection 4 mg    enoxaparin (LOVENOX) injection 40 mg    dextrose 5% infusion    insulin lispro (HUMALOG) injection    glucose chewable tablet 16 g    glucagon (GLUCAGEN) injection 1 mg    dextrose 10% infusion 0-250 mL    hydrALAZINE (APRESOLINE) tablet 12.5 mg    sacubitriL-valsartan (ENTRESTO) 24-26 mg tablet 1 Tablet    metoprolol succinate (TOPROL-XL) XL tablet 25 mg    ampicillin-sulbactam (UNASYN) 3 g in 0.9% sodium chloride (MBP/ADV) 100 mL MBP    dextrose 10% infusion 0-250 mL    insulin glargine (LANTUS) injection 10 Units    propofol (DIPRIVAN) 10 mg/mL infusion    0.9% sodium chloride infusion 250 mL    sodium hypochlorite (HALF STRENGTH DAKIN'S) 0.25% irrigation (bottle)    Vancomycin Pharmacy Dosing    acetaminophen (TYLENOL) tablet 650 mg    Or    acetaminophen (TYLENOL) suppository 650 mg    polyethylene glycol (MIRALAX) packet 17 g    ondansetron (ZOFRAN ODT) tablet 4 mg    famotidine (PEPCID) tablet 20 mg    acidophilus-pectin, citrus tablet 2 Tablet    albuterol-ipratropium (DUO-NEB) 2.5 MG-0.5 MG/3 ML    artificial saliva (MOUTH KOTE) 1 Spray    brimonidine (ALPHAGAN) 0.2 % ophthalmic solution 1 Drop    cholestyramine-aspartame (QUESTRAN LIGHT) packet 4 g    [Held by provider] fenofibrate nanocrystallized (TRICOR) tablet 48 mg    ferrous sulfate tablet 325 mg    gabapentin (NEURONTIN) capsule 300 mg    insulin glargine (LANTUS) injection 50 Units    latanoprost (XALATAN) 0.005 % ophthalmic solution 1 Drop    traMADoL (ULTRAM) tablet 25 mg      MAR reviewed and pertinent medications noted or modified as needed   Current Facility-Administered Medications   Medication    0.9% sodium chloride infusion 250 mL    0.9% sodium chloride infusion 250 mL    fluconazole (DIFLUCAN) 200mg/100 mL IVPB (premix)    aspirin chewable tablet 81 mg    NOREPINephrine (LEVOPHED) 8 mg in 0.9% NS 250ml infusion    sodium chloride (NS) flush 5-40 mL    sodium chloride (NS) flush 5-40 mL    0.9% sodium chloride infusion 250 mL    furosemide (LASIX) injection 20 mg    ondansetron (ZOFRAN) injection 4 mg    enoxaparin (LOVENOX) injection 40 mg    dextrose 5% infusion    insulin lispro (HUMALOG) injection    glucose chewable tablet 16 g    glucagon (GLUCAGEN) injection 1 mg    dextrose 10% infusion 0-250 mL    hydrALAZINE (APRESOLINE) tablet 12.5 mg    sacubitriL-valsartan (ENTRESTO) 24-26 mg tablet 1 Tablet    metoprolol succinate (TOPROL-XL) XL tablet 25 mg    ampicillin-sulbactam (UNASYN) 3 g in 0.9% sodium chloride (MBP/ADV) 100 mL MBP    dextrose 10% infusion 0-250 mL    insulin glargine (LANTUS) injection 10 Units    propofol (DIPRIVAN) 10 mg/mL infusion    0.9% sodium chloride infusion 250 mL    sodium hypochlorite (HALF STRENGTH DAKIN'S) 0.25% irrigation (bottle)    Vancomycin Pharmacy Dosing    acetaminophen (TYLENOL) tablet 650 mg    Or    acetaminophen (TYLENOL) suppository 650 mg    polyethylene glycol (MIRALAX) packet 17 g    ondansetron (ZOFRAN ODT) tablet 4 mg    famotidine (PEPCID) tablet 20 mg    acidophilus-pectin, citrus tablet 2 Tablet    albuterol-ipratropium (DUO-NEB) 2.5 MG-0.5 MG/3 ML    artificial saliva (MOUTH KOTE) 1 Spray    brimonidine (ALPHAGAN) 0.2 % ophthalmic solution 1 Drop    cholestyramine-aspartame (QUESTRAN LIGHT) packet 4 g    [Held by provider] fenofibrate nanocrystallized (TRICOR) tablet 48 mg    ferrous sulfate tablet 325 mg    gabapentin (NEURONTIN) capsule 300 mg    insulin glargine (LANTUS) injection 50 Units    latanoprost (XALATAN) 0.005 % ophthalmic solution 1 Drop    traMADoL (ULTRAM) tablet 25 mg      PMH:  has a past medical history of Anemia, Chronic kidney disease, Diabetes mellitus ([address] Hemiplegia affecting dominant side, post-stroke ([address] (05/04/2022), HTN (hypertension), Hyperkalemia, Hyperlipidemia, Ill-defined condition, Neuropathy, PAD (peripheral artery disease) ([address] Sciatica, Stroke ([address] and UTI (urinary tract infection). PSH:   has a past surgical history that includes hx other surgical (Bilateral); hx amputation (Right); hx other surgical; hx cervical fusion; hx vascular stent (Bilateral); hx vascular stent (Bilateral); hx gi; and ir insert non tunl cvc over 5 yrs (5/27/2022). FHX: family history includes Cancer in her mother; Diabetes in her mother; Hypertension in her mother. SHX:  reports that she has never smoked. She has never used smokeless tobacco. She reports previous alcohol use. She reports that she does not use drugs. ROS:  Unable to obtain intubated on ventilator    Hemodynamics:    CO:    CI:    CVP:    SVR:   PAP Systolic:    PAP Diastolic:    PVR:    AG18:        Ventilator Settings:      Mode Rate TV Press PEEP FiO2 PIP Min. Vent   Assist control,Volume control    500 ml    5 cm H20 21 %  28 cm H2O  7.3 l/min        Vital Signs: Telemetry:    normal sinus rhythm Intake/Output:   Visit Vitals  BP (!) 162/61 (BP 1 Location: Left upper arm, BP Patient Position: Semi fowlers;Supine)   Pulse 87   Temp 98.6 °F (37 °C)   Resp 13   Ht 5' 6.93\" (1.7 m)   Wt 89.1 kg (196 lb 6.9 oz)   SpO2 100%   Breastfeeding No   BMI 30.83 kg/m²       Temp (24hrs), Av.9 °F (36.6 °C), Min:97.1 °F (36.2 °C), Max:98.6 °F (37 °C)        O2 Device: Ventilator O2 Flow Rate (L/min): 1 l/min       Wt Readings from Last 4 Encounters:   22 89.1 kg (196 lb 6.9 oz)   04/10/22 86.6 kg (191 lb)   22 82.2 kg (181 lb 3.5 oz)   20 84.4 kg (186 lb)          Intake/Output Summary (Last 24 hours) at 2022 0808  Last data filed at 2022 0658  Gross per 24 hour   Intake 2314.4 ml   Output 1550 ml   Net 764.4 ml       Last shift:      No intake/output data recorded. Last 3 shifts: 1901 -  0700  In: 3659 [I.V.:1812.7]  Out: 1550 [Urine:1550]       Physical Exam:     General: intubated on vent unresponsive on propofol  HEENT: NCAT,  Eyes: anicteric; conjunctiva clear doll's eye reflex present  Neck: no nodes, no cuff leak, trach midline; no accessory MM use. Neck veins obscured by obesity but seems slightly engorged  Chest: no deformity,   Cardiac: R regular; no murmur;   Lungs: distant breath sounds; no wheezes rales in the bases  Abd: soft, NT, hypoactive BS  Ext: Ace edema; no joint swelling;  No clubbing  :clear urine  Neuro: Positive on propofol doll's eye reflex present flaccid extremities  Psych-  unable to assess  Skin: warm, dry, no cyanosis;   Pulses: Brachial and radial pulses intact  Capillary: Normal capillary refill      DATA:    MAR reviewed and pertinent medications noted or modified as needed  MEDS:   Current Facility-Administered Medications   Medication    0.9% sodium chloride infusion 250 mL    0.9% sodium chloride infusion 250 mL    fluconazole (DIFLUCAN) 200mg/100 mL IVPB (premix)    aspirin chewable tablet 81 mg    NOREPINephrine (LEVOPHED) 8 mg in 0.9% NS 250ml infusion    sodium chloride (NS) flush 5-40 mL    sodium chloride (NS) flush 5-40 mL    0.9% sodium chloride infusion 250 mL    furosemide (LASIX) injection 20 mg    ondansetron (ZOFRAN) injection 4 mg    enoxaparin (LOVENOX) injection 40 mg    dextrose 5% infusion    insulin lispro (HUMALOG) injection    glucose chewable tablet 16 g    glucagon (GLUCAGEN) injection 1 mg    dextrose 10% infusion 0-250 mL    hydrALAZINE (APRESOLINE) tablet 12.5 mg    sacubitriL-valsartan (ENTRESTO) 24-26 mg tablet 1 Tablet    metoprolol succinate (TOPROL-XL) XL tablet 25 mg    ampicillin-sulbactam (UNASYN) 3 g in 0.9% sodium chloride (MBP/ADV) 100 mL MBP    dextrose 10% infusion 0-250 mL    insulin glargine (LANTUS) injection 10 Units    propofol (DIPRIVAN) 10 mg/mL infusion    0.9% sodium chloride infusion 250 mL    sodium hypochlorite (HALF STRENGTH DAKIN'S) 0.25% irrigation (bottle)    Vancomycin Pharmacy Dosing    acetaminophen (TYLENOL) tablet 650 mg    Or    acetaminophen (TYLENOL) suppository 650 mg    polyethylene glycol (MIRALAX) packet 17 g    ondansetron (ZOFRAN ODT) tablet 4 mg    famotidine (PEPCID) tablet 20 mg    acidophilus-pectin, citrus tablet 2 Tablet    albuterol-ipratropium (DUO-NEB) 2.5 MG-0.5 MG/3 ML    artificial saliva (MOUTH KOTE) 1 Spray    brimonidine (ALPHAGAN) 0.2 % ophthalmic solution 1 Drop    cholestyramine-aspartame (QUESTRAN LIGHT) packet 4 g    [Held by provider] fenofibrate nanocrystallized (TRICOR) tablet 48 mg    ferrous sulfate tablet 325 mg    gabapentin (NEURONTIN) capsule 300 mg    insulin glargine (LANTUS) injection 50 Units    latanoprost (XALATAN) 0.005 % ophthalmic solution 1 Drop    traMADoL (ULTRAM) tablet 25 mg        Labs:    Recent Labs     22  03222  0622  0445 22  0315   WBC 13.5*  --  14.7* 14.3*   HGB 10.2*  --  8.5* 6.7*     --  361 381   INR  --  1.5*  --   --      Recent Labs     22  0445 22  0315    144 141   K 3.5 3.6 3.4*   * 115* 115*   CO2 20* 22 18*   * 114* 150*   BUN 24* 27* 29*   CREA 0.79 0.77 0.80   CA 9.0 8.5 7.8*   MG  --  1.8  --    PHOS  --  3.1  --    ALB 1.5* 1.5* 1.4*   ALT 61 54 55     Recent Labs     22  0435 22  0340   PH 7.44 7.37   PCO2 29* 31*   PO2 79 123*   HCO3 22 19*   FIO2 21.0 21.0    AC 12  PEEP 5 FiO2 30%   echo ejection fraction 20% mild mitral regurg left atrium 3.5 cm RVSP 35  , 1313, 1361    Lab Results   Component Value Date/Time    Culture result: Heavy  Mixed skin yasmine isolated   2022 06:15 PM    Culture result: Rare Normal respiratory yasmine 2022 02:48 PM    Culture result: Heavy Acinetobacter baumannii complex 05/15/2022 07:15 PM    Culture result: Heavy Diphtheroids 05/15/2022 07:15 PM     Lab Results   Component Value Date/Time    TSH 2.880 2016 10:13 AM        Imagin/22 chest x-ray with ET tube in place hazy accentuated basilar markings with small amount of fluid in the minor fissure  Results from Hospital Encounter encounter on 22    XR CHEST PORT    Narrative  XR CHEST PORT    Comparison:  2022    Single view:  Negative for focal consolidation. No pneumothorax or large pleural  effusion apparent. The heart size is stable. Stable hemodynamic status. Endotracheal tube tip in the midthoracic trachea, unchanged. Right central line  tip projects over the lower superior vena cava, unchanged. Cervical fusion  hardware partially visualized. Impression  No significant change.       Results from Hospital Encounter encounter on 05/14/22    CT ABD PELV WO CONT    Narrative  CT ABD PELV WO CONT    Technique: Noncontrasted CT scan of the abdomen and pelvis was performed  utilizing transaxial images obtained from the dome of the diaphragm to the pubic  symphysis. Coronal and sagittal reconstructions were generated. Dose Reduction:  All CT scans at this facility are performed using dose reduction optimization  techniques as appropriate to a performed exam including the following: Automated  exposure control, adjustments of the mA and/or kV according to patient size, or  use of iterative reconstruction technique. Comparison:  2/25/2022. Findings:  Small-moderate pleural effusions are present with bibasilar  atelectasis. Atherosclerotic calcifications are again evident including coronary  artery calcifications. Gastrostomy tube tip in the gastric antrum. The liver, spleen, pancreas, and  adrenal glands are unremarkable for noncontrasted technique. Stable lobulated  kidneys. Negative for hydronephrosis. Gallbladder is unremarkable. No biliary  duct dilatation apparent. The abdominal aorta is normal in caliber. There is no evidence of bowel obstruction. Small volume ascites has developed. The urinary bladder is decompressed by a Bell catheter. The uterus is  unremarkable. The appendix is normal.    There is mild body wall edema. No suspicious lytic or blastic lesion evident. Impression  Third spacing of fluids with small volume ascites, small-moderate  pleural effusions, and mild body wall edema. Bibasilar atelectasis.       5/20 intubated on ventilator we will continue current vent settings patient is supposed to go for surgery because of transaminitis elevated liver enzyme we will wait as per surgeon for sacral wound debridement and diverting loop colostomy off Levophed, improvement in AST and ALT  5/21 continue with the current vent settings ABG acceptable liver enzyme improving awaiting for the surgery  5/22 maintain ventilator as is in anticipation of surgery.   Renal function improved mild congestion on chest x-ray we will give 1 dose Lasix and potassium  5/23 patient is going for surgery today we will leave ventilator as it is INR is 1.5  5/25 patient remained on ventilator intubated doing well, patient received vitamin K fresh frozen plasma schedule for laparoscopic loop colostomy possible today  5/26 no surgery has been plans will start weaning do sedation vacation  5/27 try to do sedation vacation to wean patient blood pressure dropped still on Levophed we will continue to wean discussed with the family at bedside  5/28 remains on ventilator sedated and also on Levophed possible surgery next week  5/29 on ventilator ET tube advisement 1 to 1.5 cm we will repeat chest x-ray ABG acceptable  5/30 continue with current vent setting  5/31 awaiting for possible surgery if not we will start weaning  6/1 remain intubated on ventilator surgery has been delayed we will start weaning and plan for extubation  6/2 no surgery was planned so we will try to wean she is off Levophed we will do sedation vacation and if weaning cardio acceptable will extubate  Time of care 30 minutes

## 2022-06-02 NOTE — PROGRESS NOTES
Progress Note    Patient: Nivia Dalal MRN: [id number]  SSN: [id number]    YOB: 1945  Age: 78 y.o. Sex: female      Admit Date: 5/14/2022    LOS: 18 days     Subjective:   Patient followed for sacral wound infection with repeat culture growing Acinetobacter and Enterococci. Left foot wound also grew Acinetobacter, now status post left TMA. WBC increased today but CRP and procal decreasing. Urinalysis showing marked pyuria and yeasts. She is currently on Vancomycin and Unasyn. Patient remains intubated in the ICU. Still on vasopressor support. Daughter at bedside states that diversion colostomy has been ?postponded. Objective:     Vitals:    06/01/22 1800 06/01/22 1933 06/01/22 2000 06/01/22 2008   BP: 126/63  133/60    Pulse: 62 63 69 67   Resp: 16 11 11 11   Temp:   97.6 °F (36.4 °C)    SpO2: 100% 100% 100% 100%   Weight:   196 lb 6.9 oz (89.1 kg)    Height:            Intake and Output:  Current Shift: 06/01 1901 - 06/02 0700  In: 75   Out: -   Last three shifts: 05/31 0701 - 06/01 1900  In: 2666.3 [I.V.:110]  Out: 2100 [Urine:1750; Drains:350]    Physical Exam:    Vitals and nursing note reviewed. Constitutional:       General: She is not in acute distress. Appearance: She is ill-appearing. HENT: ET Tube  Eyes: closed   Cardiovascular:      Rate and Rhythm: Normal rate and regular rhythm. Heart sounds: No murmur heard. Pulmonary: clear bilaterally    Abdominal:      General: Bowel sounds are normal.      Palpations: Abdomen is soft. Tenderness: There is no abdominal tenderness. Comments: PEG site again has purulent drainage    Genitourinary:     Comments: Indwelling Bell with cloudy urine             Musculoskeletal:      Cervical back: Neck supple. Right lower leg: No edema. Left lower leg: No edema. Comments: Left foot surgical wound now shows dehiscence distally with bloody drainage  Skin:     Findings: No rash.              Comments: Sacral wound stool soiled with displaced wound vac sponge  Neurological: intubated   Psychiatric:  intubated      Lab/Data Review:     WBC 14,700    ALT/AST 55 /14  Urinalysis with WBC >100, Yeasts, Bacteria negative    Procal 0.26 <0.24 <0.31 <0.36  <0.59 <0.57  CRP 14.60 <11.40 <12.60  <18.90 <17.90    Blood cultures (5/14) Coagulase negative Staphylococci  Sacral wound (5/14) Acinetobacter baumannii sensitive to Unasyn, Cefepime, Ceftazidime and Enterococcus faecium resistant to Ampicillin  Sacral wound (5/23) Mixed skin yasmine FINAL  Left great toe (5/15) Acinetobacter baumannii  Sputum culture (5/19) Rare normal respiratory yasmine FINAL    CXR (5/31)  High position ET tube similar compared to prior imaging. Similar appearance  lungs. Cardiac and mediastinal structures unchanged. No pneumothorax or pleural effusion. Assessment:     Active Problems:    Sacral ulcer ([address] (5/14/2022)    1. Sacral wound infection secondary now to Acinetobacter baumannii and Enterococcus faecium, on Vancomycin and Unasyn, status post excisional wound debridement to the bone, Day #14 Vancomycin and Day #9 Unasyn. 2. Sepsis with persistent leukocytosis, elevated procal and CRP  3. Left foot wound, culture growing Acinetobacter baumannii, status post TMA  4. Possible ischemic hepatitiis with transaminitis following hypotensive episode, resolving  5. Positive blood cultures for Coagulase negative Staphylococci, probable contaminant  6. Hepatitis C antibody positive, resolved (negative HCV viral level)  7. New onset cardiomyopathy  8. Candida UTI with marked pyuria and yeasts, Day #2 IV Fluconazole    Comment:  WBC unchanged and CRP has increased now concerned about PEG site and left foot wound infection. Plan:   1. Continue IV Unasyn and Vancomycin (for E. Faecium)  2. Continue Fluconazole 200 mg IV daily for 14 days  3.  PEG site culture and left foot wound culture sent        Signed By: Jp Barcenas MD     June 1, 2022

## 2022-06-02 NOTE — PROGRESS NOTES
Arterial blood sampling attempted by multiple staff without sample being obtained, abg order cancelled

## 2022-06-02 NOTE — PROGRESS NOTES
Progress Note    Patient: Rambo Jaime MRN: [id number]  SSN: [id number]    YOB: 1945  Age: 78 y.o. Sex: female      Admit Date: 5/14/2022    LOS: 19 days     Subjective:   Patient followed for sacral wound infection with repeat culture growing Acinetobacter and Enterococci. Left foot wound also grew Acinetobacter, now status post left TMA. WBC increased today but CRP and procal decreasing. Urinalysis showing marked pyuria and yeasts. She is currently on Vancomycin and Unasyn. Patient remains intubated in the ICU. Still on vasopressor support. Daughter at bedside states that diversion colostomy has been ?postponded. Objective:     Vitals:    06/02/22 0613 06/02/22 0700 06/02/22 0811 06/02/22 1100   BP:       Pulse:   93    Resp:   18    Temp:  98.6 °F (37 °C)  99 °F (37.2 °C)   SpO2: 100%  100%    Weight:       Height:            Intake and Output:  Current Shift: No intake/output data recorded. Last three shifts: 05/31 1901 - 06/02 0700  In: 3659 [I.V.:1812.7]  Out: 1550 [Urine:1550]    Physical Exam:    Vitals and nursing note reviewed. Constitutional:       General: She is not in acute distress. Appearance: She is ill-appearing. HENT: ET Tube  Eyes: closed   Cardiovascular:      Rate and Rhythm: Normal rate and regular rhythm. Heart sounds: No murmur heard. Pulmonary: clear bilaterally    Abdominal:      General: Bowel sounds are normal.      Palpations: Abdomen is soft. Tenderness: There is no abdominal tenderness. Comments: PEG site again has purulent drainage    Genitourinary:     Comments: Indwelling Bell with cloudy urine             Musculoskeletal:      Cervical back: Neck supple. Right lower leg: No edema. Left lower leg: No edema. Comments: Left foot surgical wound now shows dehiscence distally with bloody drainage  Skin:     Findings: No rash.              Comments: Sacral wound stool soiled with displaced wound vac sponge  Neurological: intubated   Psychiatric:  intubated      Lab/Data Review:     WBC 13,500    ALT/AST 55 /14  Urinalysis with WBC >100, Yeasts, Bacteria negative    Procal 0.26 <0.24 <0.31 <0.36  <0.59 <0.57  CRP 14.60 <11.40 <12.60  <18.90 <17.90    Blood cultures (5/14) Coagulase negative Staphylococci  Sacral wound (5/14) Acinetobacter baumannii sensitive to Unasyn, Cefepime, Ceftazidime and Enterococcus faecium resistant to Ampicillin  Sacral wound (5/23) Mixed skin yasmine FINAL  Left great toe (5/15) Acinetobacter baumannii  Sputum culture (5/19) Rare normal respiratory yasmine FINAL     CXR (6/2) No significant change    Assessment:     Active Problems:    Sacral ulcer ([address] (5/14/2022)    1. Sacral wound infection secondary now to Acinetobacter baumannii and Enterococcus faecium, on Vancomycin and Unasyn, status post excisional wound debridement to the bone, Day #14 Vancomycin and Day #9 Unasyn. 2. Sepsis with persistent leukocytosis, elevated procal and CRP  3. Left foot wound, culture growing Acinetobacter baumannii, status post TMA  4. Possible ischemic hepatitiis with transaminitis following hypotensive episode, resolving  5. Positive blood cultures for Coagulase negative Staphylococci, probable contaminant  6. Hepatitis C antibody positive, resolved (negative HCV viral level)  7. New onset cardiomyopathy  8. Candida UTI with marked pyuria and yeasts, Day #2 IV Fluconazole    Comment:  WBC decreasing today. No adjustment in antibiotics necessary at this time. Plan:   1. Continue IV Unasyn and Vancomycin (for E. Faecium)  2. Continue Fluconazole 200 mg IV daily for 14 days  3.  Follow-up  PEG site culture and left foot wound culture     Signed By: Jace Villarreal MD     June 2, 2022

## 2022-06-02 NOTE — PROGRESS NOTES
Surgery critical care Progress Note     Subjective:   Patient examined in ICU. Hospital Course:  Yesterday, anesthesia team and surgical team has extensive discussion with family and the family declined any surgical intervention at this time. Patient currently awake. Patient has been sedation off. Blood pressures been stable. Subjective:   Unable to assess        Review of Systems  Unable to assess      Objective:      Visit Vitals  BP (!) 162/61 (BP 1 Location: Left upper arm, BP Patient Position: Semi fowlers;Supine)   Pulse 94   Temp 99.5 °F (37.5 °C)   Resp 17   Ht 5' 6.93\" (1.7 m)   Wt 196 lb 6.9 oz (89.1 kg)   SpO2 100%   Breastfeeding No   BMI 30.83 kg/m²       Patient is awake   Head and neck atraumatic, normocephalic. ENT: No hoarse voice  Cardiac system regular rate rhythm. Pulmonary no audible wheeze  Chest wall excursion normal with respiration cycle  Abdomen is soft not particularly distended. Neurologically nonfocal.  Skin is warm and moist.  Psychosocial: Able to assess  Vascular examination as previously noted no changes. Data Review  Labs reviewed. Assessment / Plan:  Family has decided not to proceed with surgery due to high risk of perioperative complication. For now we will continue wound VAC therapy for large open wound to sacrum. Patient's left foot wound clean and dry. Patient currently off all sedations now. We are hoping we will extubate her. Surgical team will continue to follow. Critical care time spent 30minutes involving direct patient care as well as reviewing patient's labs and coordination of care with nursing staff     Care Plan discussed with: Patient/Family/RN/Case Management           Total time spent with patient: 30 minutes.

## 2022-06-02 NOTE — PROGRESS NOTES
Vancomycin Dosing Consult  Day # 19  of vancomycin therapy  Consult ordered by Dr. Komal Davies / Dr. Jen Loaiza for this 78 y.o. female for indication of decubitus ulcer infection, sepsis. Antibiotic regimen: Vancomycin + Unasyn  + Fluconazole    Temp (24hrs), Av.9 °F (36.6 °C), Min:97.1 °F (36.2 °C), Max:98.6 °F (37 °C)    Recent Labs     22  0321 22   WBC 13.5* 14.7* 14.3*     Recent Labs     22  0321 225 22   CREA 0.79 0.77 0.80   BUN 24* 27* 29*     Recent Labs     225 22   CRP 14.60* 11.40*     Recent Labs     22   PCT 0.26* 0.26*       Estimated Creatinine Clearance: 70.4 mL/min (based on SCr of 0.79 mg/dL). ml/min  Concomitant nephrotoxic drugs: Norepinephrine     Cultures:    Wound: Moderate MDR Pseudomonas aeruginosa susceptible to Zerbaxa, aminoglycosides), moderate mixed skin yasmine, moderate mixed enteric yasmine including yeast, final   Blood: CoNS in the anaerobic bottle, final   Wound, ulcer:  Moderate Acinetobacter baumannii, moderate Enterococcus faecium, light >2 organisms (contraindicated), final  5/15 Wound, great toe: Heavy Acinetobacter baumannii complex (Zosyn resistant, susceptible to Unasyn), heavy Diphtheroids, final   Tissue: pending  MRSA Swab: Not ordered, patient already received first dose of vancomycin    Target range: Trough 10-15 mcg/mL     Recent level history:  Date/Time Dose & Interval Measured Level (mcg/mL)   528     750mg    13.4       1000mg @ 1519    12.4          750mg @ 1200    17.7    No dose  today  19.8    @ 1100 750 mg X1 15.4     Wt Readings from Last 1 Encounters:   22 89.1 kg (196 lb 6.9 oz)   Ideal body weight: 61.4 kg (135 lb 7.1 oz)  Adjusted ideal body weight: 72.5 kg (159 lb 13.4 oz)      Assessment/Plan:   Afebrile , Sepsis with leukocytosis  and same elevated procalcitonin and CRP  CKD,  Scr trending up  Level today is at 15.4. Give 750 mg  Vancomycin dose today at  1100.    A random level has been scheduled for 6/4/22 at  0400 am.  Pharmacy to continue to monitor level and adjust   Antimicrobial stop date TBD

## 2022-06-02 NOTE — WOUND CARE
Negative Pressure    NAME:  Stella Rose  YOB: 1945  MEDICAL RECORD NUMBER:  [id number]  DATE:  5/14/2022     Applied Negative Pressure to surgical incision to sacrum wound(s)/ulcer(s).  [x] Applied skin barrier prep to leah-wound.  [x] Cut strips of plastic drape to picture frame wound so that leah-wound is     covered with the drape.  [x] If bridging dressing to less prominent site, cover any intact skin that will come in contact with the Negative Pressure Therapy sponge, gauze or channel drain with plastic drape. The sponge should never touch intact skin.  [x] Cut sponge, gauze or channel drain to size which will fit into the wound/ulcer bed without being forced.  [x] Be sure the sponge is large enough to hold the entire round plastic flange which is attached to the tubing. Never allow flange to be larger than the sponge or it will produce suction damaging intact skin.  Total number of individual pieces of foam used within the wound bed: 1x honeycomb contact layer, 1x cover layer and 1x bridge foam.   [x] If bridging the dressing away from the primary site, be sure the bridge leads to a piece of sponge large enough to hold the entire flange without allowing any of the flange to overlap onto intact skin.  [x] Covered sponge, gauze or channel drain with plastic drape.  [x] Cut a hole in this plastic drape directly over the sponge the same size as the plastic drain tubing.  [x] Removed plastic liner from flange and apply it directly over the hole you cut.  [x] Removed the plastic cover from the flange.  [x] Attached the tubing to the wound/ulcer Negative Pressure Therapy and turn it on to be sure a vacuum is created and that there are no leaks.  [x] If air leaks occur, use plastic drape to patch them.  [x] Secured Negative Pressure Therapy dressing with ace wrap loosely if located on an extremity. Maintain tubing outside of ace wrap.  Tubing must not exert pressure on intact skin. Applied per  Guidelines    Wound vac leaking at area superior to rectum,  Appears zinc cream got under exuderm and drape. Wound vac reapplied to ensure no leaks, seal was successful and no leaks after first soak cycle. Optifoam sacral dressing was applied over area to ensure protection and good seal. Ensure NS bag is chaged during NPWT cycle. Next dressing change will be Monday 6/6 by [facility]. If wound vac is not working properly: 1) Check to see if track pad tubing is clogged. If so, reposition tubing and ensure tubing for spiking NS  bag is not compressed. 2) If film is leaking, find area with leak and reinforce with film. 3) If leak cannot be fixed, remove dressing, apply wet/dry dressing, and inform the [facility].    Berny Weir

## 2022-06-02 NOTE — PROGRESS NOTES
General Daily Progress Note          Patient Name:   Nandini Morales       YOB: 1945       Age:  78 y.o. 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 Cipro    Admitted again yesterday concerning for worsening of sacral ulcer    During last admission patient was seen by infectious disease and also seen by the vascular surgeon patient had debridement of wound during the last admission    Patient seen by the infectious disease yesterday    Sacral wound infection recent cultures growing Pseudomonas resistant to Zosyn and meropenem    5/17    Patient alert awake not in distress  Patient was seen by the vascular surgeon    Vascular surgery planned for laparoscopic loop colostomy placement and sacral debridement then wound vacuum  Also try to close the wound on the left foot with TMA    5/18    Resting in the bed not in any distress  Blood sugars running high    Seen by infectious disease continue vancomycin and start on Unasyn    5/19    And went to the OR intubated because of elevated LFT and elevated INR  Surgery was canceled    On ventilator 40% O2  Propofol drip    5/20    Patient on ventilator with 30% O2  On propofol drip    Hemoglobin dropped to 6.8    Patient's daughter at the bedside    5/20    Patient on ventilator 30% O2  On propofol drip  Getting PEG tube feeding    5/21    Patient still on ventilator 30% O2  On propofol drip  Liver Function improving    5/22  On ventilator with 30% O2 on propofol drip    5/24  Awaiting tissue culture results.    On ventilator with 30% O2 on propofol drip  Left transmetatarsal amputation and Sacral wound excisional wound debridement 13 x 15 cm to the bone completed yesterday. CXR: Hazy mid and lower lung reticular markings.  Dependent small to moderate volume,  right greater than left pleural effusions  Remarkable labs   WBC: 15.2   Hgb: 8.5   Na; 148  CRP: 13.60   Procal: 0.71      5/25    Patient on ventilator with 30% O2  Seen by the vascular surgeon and plan for surgery today    5/26  Patient on ventilator with 30% O2  Hypotensive on Levophed  On propofol drip    Surgery was canceled yesterday because patient unstable hemodynamically not cleared by the anesthesia    5/27    Patient on ventilator with 30% O2  Hypotensive on Levophed  Getting PEG tube feeding    5/30     Patient on ventilator with 21% O2  On propofol drip  Off pressor    5/31    Patient on ventilator with 21% O2  On propofol drip    Off pressor  Patient have a wound vacuum      6/1    Patient scheduled for surgery today    6/2    Surgery was canceled yesterday  As overall prognosis very poor  Patient on vent on 21% O2  Getting PEG tube feeding  Wound vacuum intact    Objective:     Visit Vitals  BP (!) 162/61 (BP 1 Location: Left upper arm, BP Patient Position: Semi fowlers;Supine)   Pulse 93   Temp 98.6 °F (37 °C)   Resp 18   Ht 5' 6.93\" (1.7 m)   Wt 89.1 kg (196 lb 6.9 oz)   SpO2 100%   Breastfeeding No   BMI 30.83 kg/m²        Recent Results (from the past 24 hour(s))   GLUCOSE, POC    Collection Time: 06/01/22 12:08 PM   Result Value Ref Range    Glucose (POC) 55 (L) 65 - 117 mg/dL    Performed by Tabby Mann, POC    Collection Time: 06/01/22  1:08 PM   Result Value Ref Range    Glucose (POC) 125 (H) 65 - 117 mg/dL    Performed by [address] [place] [address], POC    Collection Time: 06/01/22  5:49 PM   Result Value Ref Range    Glucose (POC) 97 65 - 117 mg/dL    Performed by Lavonne Aguiar    GLUCOSE, POC    Collection Time: 06/02/22 12:36 AM   Result Value Ref Range    Glucose (POC) 120 (H) 65 - 117 mg/dL    Performed by NOÉ Mcgrath    Collection Time: 06/02/22  3:21 AM   Result Value Ref Range    Vancomycin, random 15.4 ug/mL   CBC WITH AUTOMATED DIFF    Collection Time: 06/02/22  3:21 AM   Result Value Ref Range    WBC 13.5 (H) 3.6 - 11.0 K/uL    RBC 3.53 (L) 3.80 - 5.20 M/uL    HGB 10.2 (L) 11.5 - 16.0 g/dL    HCT 31.3 (L) 35.0 - 47.0 %    MCV 88.7 80.0 - 99.0 FL    MCH 28.9 26.0 - 34.0 PG    MCHC 32.6 30.0 - 36.5 g/dL    RDW 16.5 (H) 11.5 - 14.5 %    PLATELET 884 245 - 831 K/uL    MPV 10.9 8.9 - 12.9 FL    NRBC 0.0 0.0  WBC    ABSOLUTE NRBC 0.00 0.00 - 0.01 K/uL    NEUTROPHILS 79 (H) 32 - 75 %    LYMPHOCYTES 13 12 - 49 %    MONOCYTES 5 5 - 13 %    EOSINOPHILS 2 0 - 7 %    BASOPHILS 1 0 - 1 %    IMMATURE GRANULOCYTES 0 0 - 0.5 %    ABS. NEUTROPHILS 10.5 (H) 1.8 - 8.0 K/UL    ABS. LYMPHOCYTES 1.7 0.8 - 3.5 K/UL    ABS. MONOCYTES 0.7 0.0 - 1.0 K/UL    ABS. EOSINOPHILS 0.3 0.0 - 0.4 K/UL    ABS. BASOPHILS 0.1 0.0 - 0.1 K/UL    ABS. IMM. GRANS. 0.1 (H) 0.00 - 0.04 K/UL    DF AUTOMATED     METABOLIC PANEL, COMPREHENSIVE    Collection Time: 06/02/22  3:21 AM   Result Value Ref Range    Sodium 141 136 - 145 mmol/L    Potassium 3.5 3.5 - 5.1 mmol/L    Chloride 113 (H) 97 - 108 mmol/L    CO2 20 (L) 21 - 32 mmol/L    Anion gap 8 5 - 15 mmol/L    Glucose 174 (H) 65 - 100 mg/dL    BUN 24 (H) 6 - 20 mg/dL    Creatinine 0.79 0.55 - 1.02 mg/dL    BUN/Creatinine ratio 30 (H) 12 - 20      GFR est AA >60 >60 ml/min/1.73m2    GFR est non-AA >60 >60 ml/min/1.73m2    Calcium 9.0 8.5 - 10.1 mg/dL    Bilirubin, total 0.5 0.2 - 1.0 mg/dL    AST (SGOT) 35 15 - 37 U/L    ALT (SGPT) 61 12 - 78 U/L    Alk.  phosphatase 128 (H) 45 - 117 U/L    Protein, total 7.2 6.4 - 8.2 g/dL    Albumin 1.5 (L) 3.5 - 5.0 g/dL    Globulin 5.7 (H) 2.0 - 4.0 g/dL    A-G Ratio 0.3 (L) 1.1 - 2.2     GLUCOSE, POC    Collection Time: 06/02/22  5:31 AM   Result Value Ref Range    Glucose (POC) 180 (H) 65 - 117 mg/dL    Performed by CS Disco      [unfilled]      Review of Systems    Not a good historian e. Physical Exam:      Constitutional: On ventilator  HENT:   Head: Normocephalic and atraumatic. Eyes: Pupils are equal, round, and reactive to light. EOM are normal.   Cardiovascular: Normal rate, regular rhythm and normal heart sounds. Pulmonary/Chest: Breath sounds normal. No wheezes. No rales. Exhibits no tenderness. Abdominal: Soft. Bowel sounds are normal. There is no abdominal tenderness. There is no rebound and no guarding. Musculoskeletal: Normal range of motion. Neurological: On ventilator   skin sacral decubitus ulcer and left foot wound right big toe amputation    XR CHEST PORT   Final Result   No significant change. XR CHEST PORT   Final Result      XR CHEST PORT   Final Result      XR CHEST PORT   Final Result   No interval change or acute process. XR CHEST PORT   Final Result   ET tube retracted from previous, with tip now at the level of the   thoracic inlet, 8-9 cm above the jennifer. Stable appearance of right central   line. Stable mild enlargement of cardiopericardial silhouette. No evidence of   pulmonary edema, air space pneumonia, or pleural effusion. No evidence of   pneumothorax. Some structures are partially obscured by overlying monitoring   electrodes. XR CHEST PORT   Final Result   Basilar airspace disease, possibly subsegmental atelectasis, stable. XR CHEST PORT   Final Result   Cardiomegaly with vascular congestion. Stable appearance of ET tube. Right central line placed, without radiographic evidence of complication. IR INSERT NON TUNL CVC OVER 5 YRS   Final Result   Successful placement of a triple-lumen central venous catheter. The   catheter is ready for use.       XR CHEST PORT   Final Result      XR CHEST PORT   Final Result      XR CHEST PORT   Final Result      CT ABD PELV WO CONT   Final Result   Third spacing of fluids with small volume ascites, small-moderate   pleural effusions, and mild body wall edema. Bibasilar atelectasis. XR CHEST PORT   Final Result   Similar findings compared to single view chest 1 day earlier. XR CHEST PORT   Final Result   Findings/IMPRESSION: Stable appearance of endotracheal tube. Stable mild   enlargement of cardiomediastinal silhouette. Central vascular congestion with   bilateral perihilar and basilar opacities, consistent with edema and/or   pneumonia, right greater than left. Possible right pleural effusion. No   pneumothorax. XR CHEST PORT   Final Result   Bibasilar opacities, possibly increased on the right. XR CHEST PORT   Final Result   No significant change allowing for difference in technique and   positioning. Single portable AP view compared to yesterday. Suboptimal inspiratory film   compromises visualization of the lower lung fields. Monitoring equipment   overlies the body. The tip of the tube is approximately 3 cm above the jennifer. Mild apparent cardiomegaly. Left heart border and left diaphragm obscured. Hazy airspace opacification both lung bases may be a combination of atelectasis,   pneumonia, or edema. No large effusion. Negative for pneumothorax. XR CHEST PORT   Final Result   No significant change allowing for difference in technique and   positioning. Single portable AP view compared to yesterday. Rotation to the right. Monitoring   equipment overlies the body. Suboptimal inspiratory film compromises   visualization of the lower lung fields. Mild cardiomegaly. The tip of the ET tube is approximately 3 cm above the jennifer. Mild basal interstitial edema. No new consolidation. No pleural effusion or   pneumothorax. XR CHEST PORT   Final Result   1. The endotracheal tube is in satisfactory position. 2.  There has been a partial interval resolution in the pulmonary interstitial   edema pattern.       XR CHEST PORT   Final Result   Ill-defined haziness in the mid to lower lung zones suspect for mild   atelectatic changes and/or interstitial fluid or pleural fluid. US ABD LTD   Final Result   1. Question pericholecystic fluid. No identified gallstones or sludge. 2. Right pleural effusion. 3. Increased right renal echotexture for medical renal disease. XR CHEST PORT   Final Result   Intubation. Findings suggesting hydrostatic edema. XR CHEST PORT   Final Result   No evidence of an acute cardiopulmonary process.       IR FLUORO GUIDE PLC CVAD    (Results Pending)   IR US GUIDED VASCULAR ACCESS    (Results Pending)   XR CHEST PORT    (Results Pending)        Recent Results (from the past 24 hour(s))   GLUCOSE, POC    Collection Time: 06/01/22 12:08 PM   Result Value Ref Range    Glucose (POC) 55 (L) 65 - 117 mg/dL    Performed by Demaris Dock    GLUCOSE, POC    Collection Time: 06/01/22  1:08 PM   Result Value Ref Range    Glucose (POC) 125 (H) 65 - 117 mg/dL    Performed by [address] [place] [address], POC    Collection Time: 06/01/22  5:49 PM   Result Value Ref Range    Glucose (POC) 97 65 - 117 mg/dL    Performed by Demaris Dock    GLUCOSE, POC    Collection Time: 06/02/22 12:36 AM   Result Value Ref Range    Glucose (POC) 120 (H) 65 - 117 mg/dL    Performed by Sridevi Krishna, RANDOM    Collection Time: 06/02/22  3:21 AM   Result Value Ref Range    Vancomycin, random 15.4 ug/mL   CBC WITH AUTOMATED DIFF    Collection Time: 06/02/22  3:21 AM   Result Value Ref Range    WBC 13.5 (H) 3.6 - 11.0 K/uL    RBC 3.53 (L) 3.80 - 5.20 M/uL    HGB 10.2 (L) 11.5 - 16.0 g/dL    HCT 31.3 (L) 35.0 - 47.0 %    MCV 88.7 80.0 - 99.0 FL    MCH 28.9 26.0 - 34.0 PG    MCHC 32.6 30.0 - 36.5 g/dL    RDW 16.5 (H) 11.5 - 14.5 %    PLATELET 171 014 - 681 K/uL    MPV 10.9 8.9 - 12.9 FL    NRBC 0.0 0.0  WBC    ABSOLUTE NRBC 0.00 0.00 - 0.01 K/uL    NEUTROPHILS 79 (H) 32 - 75 %    LYMPHOCYTES 13 12 - 49 %    MONOCYTES 5 5 - 13 %    EOSINOPHILS 2 0 - 7 %    BASOPHILS 1 0 - 1 %    IMMATURE GRANULOCYTES 0 0 - 0.5 %    ABS. NEUTROPHILS 10.5 (H) 1.8 - 8.0 K/UL    ABS. LYMPHOCYTES 1.7 0.8 - 3.5 K/UL    ABS. MONOCYTES 0.7 0.0 - 1.0 K/UL    ABS. EOSINOPHILS 0.3 0.0 - 0.4 K/UL    ABS. BASOPHILS 0.1 0.0 - 0.1 K/UL    ABS. IMM. GRANS. 0.1 (H) 0.00 - 0.04 K/UL    DF AUTOMATED     METABOLIC PANEL, COMPREHENSIVE    Collection Time: 06/02/22  3:21 AM   Result Value Ref Range    Sodium 141 136 - 145 mmol/L    Potassium 3.5 3.5 - 5.1 mmol/L    Chloride 113 (H) 97 - 108 mmol/L    CO2 20 (L) 21 - 32 mmol/L    Anion gap 8 5 - 15 mmol/L    Glucose 174 (H) 65 - 100 mg/dL    BUN 24 (H) 6 - 20 mg/dL    Creatinine 0.79 0.55 - 1.02 mg/dL    BUN/Creatinine ratio 30 (H) 12 - 20      GFR est AA >60 >60 ml/min/1.73m2    GFR est non-AA >60 >60 ml/min/1.73m2    Calcium 9.0 8.5 - 10.1 mg/dL    Bilirubin, total 0.5 0.2 - 1.0 mg/dL    AST (SGOT) 35 15 - 37 U/L    ALT (SGPT) 61 12 - 78 U/L    Alk.  phosphatase 128 (H) 45 - 117 U/L    Protein, total 7.2 6.4 - 8.2 g/dL    Albumin 1.5 (L) 3.5 - 5.0 g/dL    Globulin 5.7 (H) 2.0 - 4.0 g/dL    A-G Ratio 0.3 (L) 1.1 - 2.2     GLUCOSE, POC    Collection Time: 06/02/22  5:31 AM   Result Value Ref Range    Glucose (POC) 180 (H) 65 - 117 mg/dL    Performed by Wander Lisa        Results     Procedure Component Value Units Date/Time    CULTURE, Delmy Sanders STAIN [[id number]]     Order Status: Sent Specimen: Wound from PEG Site     CULTURE, Delmy Sanders STAIN [[id number]]     Order Status: Sent Specimen: Wound from Foot     CULTURE, Delmy Sanders STAIN [[id number]] Collected: 05/30/22 1800    Order Status: Canceled Specimen: Sacral Wound     CULTURE, TISSUE Sadatanna Young STAIN [[id number]] Collected: 05/23/22 1815    Order Status: Completed Specimen: Tissue Updated: 05/26/22 1510     Special Requests: No Special Requests        GRAM STAIN Rare WBCs seen         Few Gram Negative Rods        Culture result: Heavy  Mixed skin yasmine isolated       CULTURE, RESPIRATORY/SPUTUM/BRONCH Clarnce Fluke STAIN [[id number]] Collected: 05/19/22 1448    Order Status: Completed Specimen: Sputum Updated: 05/21/22 0813     Special Requests: No Special Requests        GRAM STAIN 1+ WBCs seen         no epithelial cells seen         No organisms seen        Culture result:       Rare Normal respiratory yasmine                 Labs:     Recent Labs     06/02/22 0321 06/01/22  0445   WBC 13.5* 14.7*   HGB 10.2* 8.5*   HCT 31.3* 26.8*    361     Recent Labs     06/02/22 0321 06/01/22 0445 05/31/22  0315    144 141   K 3.5 3.6 3.4*   * 115* 115*   CO2 20* 22 18*   BUN 24* 27* 29*   CREA 0.79 0.77 0.80   * 114* 150*   CA 9.0 8.5 7.8*   MG  --  1.8  --    PHOS  --  3.1  --      Recent Labs     06/02/22 0321 06/01/22 0445 05/31/22  0315   ALT 61 54 55   * 101 88   TBILI 0.5 0.5 0.3   TP 7.2 6.3* 6.8   ALB 1.5* 1.5* 1.4*   GLOB 5.7* 4.8* 5.4*     Recent Labs     06/01/22  0600   INR 1.5*   PTP 17.9*      No results for input(s): FE, TIBC, PSAT, FERR in the last 72 hours. No results found for: FOL, RBCF   Recent Labs     06/01/22  0435 05/31/22  0340   PH 7.44 7.37   PCO2 29* 31*   PO2 79 123*     No results for input(s): CPK, CKNDX, TROIQ in the last 72 hours.     No lab exists for component: CPKMB  Lab Results   Component Value Date/Time    Cholesterol, total 124 03/08/2022 07:40 AM    HDL Cholesterol 30 03/08/2022 07:40 AM    LDL,Direct 79 06/28/2021 12:00 AM    LDL, calculated 44.8 03/08/2022 07:40 AM    Triglyceride 202 (H) 05/16/2022 06:20 AM    CHOL/HDL Ratio 4.1 03/08/2022 07:40 AM     Lab Results   Component Value Date/Time    Glucose (POC) 180 (H) 06/02/2022 05:31 AM    Glucose (POC) 120 (H) 06/02/2022 12:36 AM    Glucose (POC) 97 06/01/2022 05:49 PM    Glucose (POC) 125 (H) 06/01/2022 01:08 PM    Glucose (POC) 55 (L) 06/01/2022 12:08 PM     Lab Results   Component Value Date/Time    Color Yellow/Straw 05/31/2022 05:56 AM    Appearance Turbid (A) 05/31/2022 05:56 AM    Specific gravity 1.006 05/31/2022 05:56 AM    pH (UA) 5.0 05/31/2022 05:56 AM    Protein 30 (A) 05/31/2022 05:56 AM    Glucose Negative 05/31/2022 05:56 AM    Ketone Negative 05/31/2022 05:56 AM    Bilirubin Negative 05/31/2022 05:56 AM    Urobilinogen 0.1 05/31/2022 05:56 AM    Nitrites Negative 05/31/2022 05:56 AM    Leukocyte Esterase Large (A) 05/31/2022 05:56 AM    Bacteria Negative 05/31/2022 05:56 AM    WBC >100 (H) 05/31/2022 05:56 AM    RBC  05/31/2022 05:56 AM         Assessment:   Acute respiratory failure on ventilator 30% O2  Sacral decubitus ulcer postdebridement  Sepsis with leukocytosis elevated procalcitonin and CRP  Left foot wound  Recent removal of left great toe and second toe  CVA with PEG tube placement  History of aspiration pneumonia  History of chronic kidney disease  CAD with ejection fraction about 20 to 25%  Hypertension  Hyperlipidemia  Anemia of chronic disease  Hyperkalemia  Static post transfusion  Uncontrolled diabetes  Hepatic shock/improving  Coagulopathy  Elevated  Troponin  Bacteremia with coagulase-negative Staphylococcus  Procedure:  1. Left transmetatarsal amputation. 2.  Sacral wound excisional wound debridement 13 x 15 cm to the bone. Hypotension on Levophed  Hypokalemia/replace potassium  Plan:     Unasyn 3 g every 8 hours  Aspirin 81 mg daily  Questran 4 g twice a day  Pepcid 20 twice daily  Ferrous sulfate 325 mg twice a day  Lovenox 40 subcu daily  Lasix 20 mg IV every 12 hours  Pepcid 20 twice daily  Ferrous sulfate 325 mg twice a day  Lantus 50 units subcu daily and 10 units in p.m.   Gabapentin 300 mg twice a day  Hydralazine 12.5 mg 3 times a day  Metoprolol 25 daily  Vitamin K 10 mg daily  Replace potassium  Entresto 1 tablet twice a day  Vancomycin with pharmacy protocol  Replace potassium  2 A of sodium bicarb    Repeat  the labs overall prognosis very poor        laparoscopic loop colostomy for fecal diversion canceled        Continue current medications    Discussed with ICU nurse    Discussed with the patient daughter daughters today/  Detailed discussion regarding poor prognosis  And future plan         Current Facility-Administered Medications:     glycopyrrolate (ROBINUL) injection 0.1 mg, 0.1 mg, IntraVENous, TID, Martínez Crespo MD, 0.1 mg at 06/02/22 0952    vancomycin (VANCOCIN) 750 mg in 0.9% sodium chloride 250 mL ([id number]), 750 mg, IntraVENous, ONCE, Abi Meyer MD    [START ON 6/4/2022] Vancomycin random level to be drawn on 6/4/22 at 0400 am., , Other, Ras Kellogg MD    0.9% sodium chloride infusion 250 mL, 250 mL, IntraVENous, PRN, Kadeem Bbo MD    0.9% sodium chloride infusion 250 mL, 250 mL, IntraVENous, PRN, Cam Soto MD    fluconazole (DIFLUCAN) 200mg/100 mL IVPB (premix), 200 mg, IntraVENous, Q24H, Abi Meyer MD, Last Rate: 100 mL/hr at 06/02/22 0952, 200 mg at 06/02/22 4809    aspirin chewable tablet 81 mg, 81 mg, Per G Tube, DAILY, Israel Soto MD, 81 mg at 06/02/22 0952    NOREPINephrine (LEVOPHED) 8 mg in 0.9% NS 250ml infusion, 0.5-16 mcg/min, IntraVENous, TITRATE, Israel Soto MD, Paused at 05/29/22 2046    sodium chloride (NS) flush 5-40 mL, 5-40 mL, IntraVENous, Q8H, Amarjit Matthew MD, 10 mL at 06/02/22 0543    sodium chloride (NS) flush 5-40 mL, 5-40 mL, IntraVENous, PRN, Lambert Cardenas MD, 10 mL at 05/31/22 2213    0.9% sodium chloride infusion 250 mL, 250 mL, IntraVENous, PRN, Kadeem Bob MD    furosemide (LASIX) injection 20 mg, 20 mg, IntraVENous, Q12H, Kadeem Bob MD, 20 mg at 06/02/22 0953    [DISCONTINUED] ondansetron (ZOFRAN ODT) tablet 4 mg, 4 mg, Oral, Q8H PRN **OR** ondansetron (ZOFRAN) injection 4 mg, 4 mg, IntraVENous, Q6H PRN, Kadeem Bob MD    enoxaparin (LOVENOX) injection 40 mg, 40 mg, SubCUTAneous, DAILY, Kadeem Bob MD, 40 mg at 06/02/22 0951    dextrose 5% infusion, 30 mL/hr, IntraVENous, CONTINUOUS, Kalpesh Lara MD, Last Rate: 30 mL/hr at 06/01/22 1123, 30 mL/hr at 06/01/22 1123    insulin lispro (HUMALOG) injection, , SubCUTAneous, Q6H, Marlena Bob MD, 1 Units at 06/02/22 0543    glucose chewable tablet 16 g, 4 Tablet, Oral, PRN, Marlena Bob MD    glucagon [facility]) injection 1 mg, 1 mg, IntraMUSCular, PRN, Marlena Bob MD    dextrose 10% infusion 0-250 mL, 0-250 mL, IntraVENous, PRN, Marlena Bob MD, 250 mL at 06/01/22 1217    hydrALAZINE (APRESOLINE) tablet 12.5 mg, 12.5 mg, Oral, TID, Marlena Bob MD, 12.5 mg at 06/02/22 6708    sacubitriL-valsartan (ENTRESTO) 24-26 mg tablet 1 Tablet, 1 Tablet, Oral, Q12H, Marlena Bob MD, 1 Tablet at 06/02/22 2231    metoprolol succinate (TOPROL-XL) XL tablet 25 mg, 25 mg, Oral, DAILY, Marlena Bob MD, 25 mg at 05/30/22 0919    ampicillin-sulbactam (UNASYN) 3 g in 0.9% sodium chloride (MBP/ADV) 100 mL MBP, 3 g, IntraVENous, Q8H, Marelna Bob MD, Last Rate: 200 mL/hr at 06/02/22 0543, 3 g at 06/02/22 0543    dextrose 10% infusion 0-250 mL, 0-250 mL, IntraVENous, PRN, Marlena Bob MD, 125 mL at 05/19/22 0537    insulin glargine (LANTUS) injection 10 Units, 10 Units, SubCUTAneous, QHS, Marlena Bob MD, 10 Units at 06/01/22 2200    propofol (DIPRIVAN) 10 mg/mL infusion, 0-50 mcg/kg/min, IntraVENous, TITRATE, Marlena Bob MD, Stopped at 06/02/22 0730    0.9% sodium chloride infusion 250 mL, 250 mL, IntraVENous, PRN, Marlena Bob MD, Last Rate: 15 mL/hr at 05/22/22 0820, Rate Verify at 05/22/22 0820    sodium hypochlorite (HALF STRENGTH DAKIN'S) 0.25% irrigation (bottle), , Topical, BID, Marlena Bob MD, Given at 06/01/22 2202    Vancomycin Pharmacy Dosing, , Other, Rx Dosing/Monitoring, Marlena Bob MD    acetaminophen (TYLENOL) tablet 650 mg, 650 mg, Oral, Q6H PRN, 650 mg at 05/22/22 0556 **OR** acetaminophen (TYLENOL) suppository 650 mg, 650 mg, Rectal, Q6H PRN, Marlena Bob MD, 650 mg at 05/16/22 2223    polyethylene glycol (MIRALAX) packet 17 g, 17 g, Oral, DAILY PRN, Shannan Bob MD    ondansetron (ZOFRAN ODT) tablet 4 mg, 4 mg, Oral, Q8H PRN **OR** [DISCONTINUED] ondansetron (ZOFRAN) injection 4 mg, 4 mg, IntraVENous, Q6H PRN, Shannan Bob MD    famotidine (PEPCID) tablet 20 mg, 20 mg, Oral, BID, Shannan Bob MD, 20 mg at 06/02/22 2643    acidophilus-pectin, citrus tablet 2 Tablet, 2 Tablet, Oral, DAILY, Shannan Bob MD, 2 Tablet at 06/02/22 0952    albuterol-ipratropium (DUO-NEB) 2.5 MG-0.5 MG/3 ML, 3 mL, Nebulization, Q6H PRN, Shannan Bob MD    artificial saliva (MOUTH KOTE) 1 Spray, 1 Spray, Oral, TID, Shannan Bob MD, 1 [place] at 06/01/22 2203    brimonidine (ALPHAGAN) 0.2 % ophthalmic solution 1 Drop, 1 Drop, Both Eyes, TID, Shannan Bob MD, 1 Drop at 06/02/22 0953    cholestyramine-aspartame (QUESTRAN LIGHT) packet 4 g, 4 g, Oral, BID WITH MEALS, Shannan Bob MD, 4 g at 06/02/22 4420    [Held by provider] fenofibrate nanocrystallized (TRICOR) tablet 48 mg, 48 mg, Oral, DAILY, Shannan Bob MD, 48 mg at 05/19/22 0748    ferrous sulfate tablet 325 mg, 325 mg, Oral, BID, Shannan Bob MD, 325 mg at 06/02/22 8256    gabapentin (NEURONTIN) capsule 300 mg, 300 mg, Oral, BID, Shannan Bob MD, 300 mg at 06/02/22 4317    insulin glargine (LANTUS) injection 50 Units, 50 Units, SubCUTAneous, DAILY, Shannan Bob MD, 50 Units at 06/02/22 0951    latanoprost (XALATAN) 0.005 % ophthalmic solution 1 Drop, 1 Drop, Right Eye, QPM, Paulino, Shannan Herrera MD, 1 Drop at 06/01/22 1701    traMADoL (ULTRAM) tablet 25 mg, 25 mg, Oral, Q12H PRN, Daniel Melendez MD, 25 mg at 05/18/22 2153

## 2022-06-03 NOTE — PROGRESS NOTES
Surgery critical care Progress Note     Subjective:   Patient examined in ICU. Hospital Course:  No major events overnight. Patient was very agitated when sedation is off. So sedation was restarted with the propofol. Patient blood pressure has been stable no more episodes of bradycardia. Wound VAC has quite a bit of serous fluid. I examined the patient is left foot. Wound edges are dusky and dark. Monophasic Doppler signal noted in the very sluggish. Subjective:  Unable to assess        Review of Systems  Unable to assess      Objective:      Visit Vitals  /63 (BP 1 Location: Right upper arm, BP Patient Position: At rest)   Pulse 80   Temp 99.3 °F (37.4 °C)   Resp 20   Ht 5' 6.93\" (1.7 m)   Wt 193 lb 9 oz (87.8 kg)   SpO2 100%   Breastfeeding No   BMI 30.38 kg/m²       Patient is currently intubated and sedated. Head and neck atraumatic, normocephalic. ENT: No hoarse voice  Cardiac system regular rate rhythm. Pulmonary no audible wheeze  Chest wall excursion normal with respiration cycle  Abdomen is soft not particularly distended. Neurologically nonfocal.  Skin is warm and moist.  Psychosocial: Unable to assess. Vascular examination as previously noted no changes. Data Review  Labs reviewed. Assessment / Plan:  Patient's  left foot looks concerning and is showing signs of ischemic changes. We are currently working on try to extubate her since her family has decided not to proceed with surgery. For now we will continue wound VAC changes, continue local wound care left foot. And I will update family about the left foot condition. Critical care time spent 30minutes involving direct patient care as well as reviewing patient's labs and coordination of care with nursing staff     Care Plan discussed with: Patient/Family/RN/Case Management           Total time spent with patient: 30 minutes.

## 2022-06-03 NOTE — PROGRESS NOTES
Subjective: Awake and alert. Extubated this morning. Surgery was cancelled as overall prognosis is poor. INPATIENT MEDICATIONS:  Home medications reviewed.     Current Facility-Administered Medications:     potassium chloride SR (KLOR-CON 10) tablet 40 mEq, 40 mEq, Oral, NOW, Israel Soto MD    glycopyrrolate (ROBINUL) injection 0.1 mg, 0.1 mg, IntraVENous, TID, Martínez Crespo MD, 0.1 mg at 06/02/22 2159    [START ON 6/4/2022] Vancomycin random level to be drawn on 6/4/22 at 0400 am., , Other, Christine Khan MD    0.9% sodium chloride infusion 250 mL, 250 mL, IntraVENous, PRN, Mary Bob MD    0.9% sodium chloride infusion 250 mL, 250 mL, IntraVENous, PRN, Maranda Soto MD    fluconazole (DIFLUCAN) 200mg/100 mL IVPB (premix), 200 mg, IntraVENous, Q24H, Kyra Ahmadi MD, Last Rate: 100 mL/hr at 06/02/22 0952, 200 mg at 06/02/22 2604    aspirin chewable tablet 81 mg, 81 mg, Per G Tube, DAILY, Israel Soto MD, 81 mg at 06/02/22 0952    NOREPINephrine (LEVOPHED) 8 mg in 0.9% NS 250ml infusion, 0.5-16 mcg/min, IntraVENous, TITRATE, Israel Soto MD, Paused at 05/29/22 2046    sodium chloride (NS) flush 5-40 mL, 5-40 mL, IntraVENous, Q8H, Mynor Matthew MD, 10 mL at 06/03/22 0600    sodium chloride (NS) flush 5-40 mL, 5-40 mL, IntraVENous, PRN, Maude Kent MD, 10 mL at 05/31/22 2213    0.9% sodium chloride infusion 250 mL, 250 mL, IntraVENous, PRN, Mary Bob MD    furosemide (LASIX) injection 20 mg, 20 mg, IntraVENous, Q12H, Mary Bob MD, 20 mg at 06/02/22 2209    [DISCONTINUED] ondansetron (ZOFRAN ODT) tablet 4 mg, 4 mg, Oral, Q8H PRN **OR** ondansetron (ZOFRAN) injection 4 mg, 4 mg, IntraVENous, Q6H PRN, Mary Bob MD    enoxaparin (LOVENOX) injection 40 mg, 40 mg, SubCUTAneous, DAILY, Mary Bob MD, 40 mg at 06/02/22 0951    dextrose 5% infusion, 30 mL/hr, IntraVENous, CONTINUOUS, Kalpesh Lara MD, Last Rate: 30 mL/hr at 06/03/22 0058, 30 mL/hr at 06/03/22 0058    insulin lispro (HUMALOG) injection, , SubCUTAneous, Q6H, Odalys Bob MD, 1 Units at 06/02/22 2350    glucose chewable tablet 16 g, 4 Tablet, Oral, PRN, Odalys Bob MD    glucagon [facility]) injection 1 mg, 1 mg, IntraMUSCular, PRN, Odalys Bob MD    dextrose 10% infusion 0-250 mL, 0-250 mL, IntraVENous, PRN, Odalys Bob MD, 250 mL at 06/01/22 1217    hydrALAZINE (APRESOLINE) tablet 12.5 mg, 12.5 mg, Oral, TID, Odalys Bob MD, 12.5 mg at 06/02/22 2201    sacubitriL-valsartan (ENTRESTO) 24-26 mg tablet 1 Tablet, 1 Tablet, Oral, Q12H, Odalys Bob MD, 1 Tablet at 06/02/22 2201    metoprolol succinate (TOPROL-XL) XL tablet 25 mg, 25 mg, Oral, DAILY, Odalys Bob MD, 25 mg at 05/30/22 0919    ampicillin-sulbactam (UNASYN) 3 g in 0.9% sodium chloride (MBP/ADV) 100 mL MBP, 3 g, IntraVENous, Q8H, Odalys Bob MD, Last Rate: 200 mL/hr at 06/03/22 0531, 3 g at 06/03/22 0531    dextrose 10% infusion 0-250 mL, 0-250 mL, IntraVENous, PRN, Odalys Bob MD, 125 mL at 05/19/22 0537    insulin glargine (LANTUS) injection 10 Units, 10 Units, SubCUTAneous, QHS, Odalys Bob MD, 10 Units at 06/02/22 2220    propofol (DIPRIVAN) 10 mg/mL infusion, 0-50 mcg/kg/min, IntraVENous, TITRATE, Odalys Bob MD, Last Rate: 9.5 mL/hr at 06/03/22 0051, 20 mcg/kg/min at 06/03/22 0051    0.9% sodium chloride infusion 250 mL, 250 mL, IntraVENous, PRN, Odalys Bob MD, Last Rate: 15 mL/hr at 05/22/22 0820, Rate Verify at 05/22/22 0820    sodium hypochlorite (HALF STRENGTH DAKIN'S) 0.25% irrigation (bottle), , Topical, BID, Odalys Bob MD, Given at 06/01/22 2202    Vancomycin Pharmacy Dosing, , Other, Rx Dosing/Monitoring, Odalys Bob MD    acetaminophen (TYLENOL) tablet 650 mg, 650 mg, Oral, Q6H PRN, 650 mg at 05/22/22 0556 **OR** acetaminophen (TYLENOL) suppository 650 mg, 650 mg, Rectal, Q6H PRN, Odalys Bob MD, 650 mg at 05/16/22 2228   polyethylene glycol (MIRALAX) packet 17 g, 17 g, Oral, DAILY PRN, Niraj Bob MD    ondansetron (ZOFRAN ODT) tablet 4 mg, 4 mg, Oral, Q8H PRN **OR** [DISCONTINUED] ondansetron (ZOFRAN) injection 4 mg, 4 mg, IntraVENous, Q6H PRN, Niraj Bob MD    famotidine (PEPCID) tablet 20 mg, 20 mg, Oral, BID, Niraj Bob MD, 20 mg at 06/02/22 2202    acidophilus-pectin, citrus tablet 2 Tablet, 2 Tablet, Oral, DAILY, Niraj Bob MD, 2 Tablet at 06/02/22 0952    albuterol-ipratropium (DUO-NEB) 2.5 MG-0.5 MG/3 ML, 3 mL, Nebulization, Q6H PRN, Niraj Bob MD    artificial saliva (MOUTH KOTE) 1 Spray, 1 Spray, Oral, TID, Niraj Bob MD, 1 Gully at 06/02/22 2214    brimonidine (ALPHAGAN) 0.2 % ophthalmic solution 1 Drop, 1 Drop, Both Eyes, TID, Niraj Bob MD, 1 Drop at 06/02/22 2216    cholestyramine-aspartame (QUESTRAN LIGHT) packet 4 g, 4 g, Oral, BID WITH MEALS, Niraj Bob MD, 4 g at 06/02/22 1756    [Held by provider] fenofibrate nanocrystallized (TRICOR) tablet 48 mg, 48 mg, Oral, DAILY, Niraj Bob MD, 48 mg at 05/19/22 2618    ferrous sulfate tablet 325 mg, 325 mg, Oral, BID, Niraj Bob MD, 325 mg at 06/02/22 2201    gabapentin (NEURONTIN) capsule 300 mg, 300 mg, Oral, BID, Niraj Bob MD, 300 mg at 06/02/22 2201    insulin glargine (LANTUS) injection 50 Units, 50 Units, SubCUTAneous, DAILY, Niraj Bob MD, 50 Units at 06/02/22 0951    latanoprost (XALATAN) 0.005 % ophthalmic solution 1 Drop, 1 Drop, Right Eye, QPM, Paulino, Niraj Byrd MD, 1 Drop at 06/02/22 1757    traMADoL (ULTRAM) tablet 25 mg, 25 mg, Oral, Q12H PRN, Paulino, Niraj Byrd MD, 25 mg at 05/18/22 0056       REVIEW OF SYSTEMS:  Intubated. Physical Exam:   General: Chronically ill appearing female lying in bed intubated/sedated, NAD  HEENT: Normocephalic, PERRL, no drainage  Neck: Supple, Trachea midline, No JVD  RESP: Coarse globally with diminished lower lobes. + Symmetrical chest movement. 30% FiO2. Intubated/Ventilated. Cardiovascular: RRR no RG. + murmur. PVS: No rubor, cyanosis, mild pitting BLE edema, Radial, DP, PT pulses equal bilaterally  ABD: obese, soft, NT, Normoactive BS  Derm: +arterial line,+sacral wound vac  :+giraldo catheter  Neuro: sedation off  PSYCH: No anxiety or agitation    Visit Vitals  /63 (BP 1 Location: Right upper arm, BP Patient Position: At rest)   Pulse 72   Temp 98.4 °F (36.9 °C)   Resp 12   Ht 5' 6.93\" (1.7 m)   Wt 87.8 kg (193 lb 9 oz)   SpO2 98%   Breastfeeding No   BMI 30.38 kg/m²     Case was discussed with Dr. Geni Brittle and our impression and recommendations are as follows:     1. New onset cardiomyopathy:    - Echo 5/18/22 showing EF of 20-25% down from 50-55% in 4/2022.    - Resume HF medications, patient no longer on pressor support  -  Recommend ischemic evaluation once patient is clinically stable. Deferred at this time due to patient's acuity. - The patient is considered high risk for upcoming procedure. - Discussed the potential cardiac risks involved with surgical interventions with the patient's family and they have requested to proceed with the surgery.      2. Elevated troponin:    - HS troponin peaked at 890, currently 275. -  Hgb 9.4  - Continue BB. -  Ischemic evaluation pending post- op clinical course.    - No statin given LFTs elevated likely due to shock.       3.   NSVT:   - No reoccurrence.    - Continue beta blocker.   - Maintain lytes.        Thank you for involving us in the care of this patient. Please do not hesitate to call with questions or concerns. Cinthia Skelton NP  6/3/2022     [facility]  [address] [facility].    [address], [facility] [facility]  [phone]

## 2022-06-03 NOTE — PROGRESS NOTES
Progress Note    Patient: Kati Burr MRN: [id number]  SSN: [id number]    YOB: 1945  Age: 78 y.o. Sex: female      Admit Date: 5/14/2022    LOS: 20 days     Subjective:   Patient followed for sacral wound infection with repeat culture growing Acinetobacter and Enterococci. Left foot wound also grew Acinetobacter, now status post left TMA. WBC now decreasing along with CRP and procal.  Urinalysis showing marked pyuria and yeasts for which she is on Fluconazole. She is currently on Vancomycin and Unasyn. Patient has now been extubated. She is awake but nonverbal.  CXR unchanged today. Objective:     Vitals:    06/03/22 0700 06/03/22 0756 06/03/22 0855 06/03/22 1021   BP:    (!) 160/73   Pulse:  72  82   Resp:  12     Temp: 98.4 °F (36.9 °C)      SpO2:  100% 98%    Weight:       Height:            Intake and Output:  Current Shift: No intake/output data recorded. Last three shifts: 06/01 1901 - 06/03 0700  In: 2873.8 [I.V.:2148.8]  Out: 0668 [Urine:2675; Drains:150]    Physical Exam:    Vitals and nursing note reviewed. Constitutional:       General: She is not in acute distress. Appearance: She is ill-appearing. HENT: Nasal O2 cannula    Eyes: open        Cardiovascular:      Rate and Rhythm: Normal rate and regular rhythm. Heart sounds: No murmur heard. Pulmonary: clear bilaterally    Abdominal:      General: Bowel sounds are normal.      Palpations: Abdomen is soft. Tenderness: There is no abdominal tenderness. Comments: PEG site unremarkable today      Genitourinary:     Comments: Indwelling Bell with cloudy urine             Musculoskeletal:      Cervical back: Neck supple. Right lower leg: No edema. Left lower leg: No edema. No     Comments: Left foot surgical wound now shows dehiscence distally with bloody drainage  Skin:     Findings: No rash.              Comments: Sacral wound stool soiled with displaced wound vac sponge  Neurological: intubated   Psychiatric:  intubated      Lab/Data Review:     WBC 12,000    ALT/AST 55 /14  Urinalysis with WBC >100, Yeasts, Bacteria negative    Procal 0.31 <0.26 <0.24 <0.31 <0.36  <0.59 <0.57  CRP 13.60 < 14.60 <11.40 <12.60  <18.90 <17.90    Blood cultures (5/14) Coagulase negative Staphylococci  Sacral wound (5/14) Acinetobacter baumannii sensitive to Unasyn, Cefepime, Ceftazidime and Enterococcus faecium resistant to Ampicillin  Sacral wound (5/23) Mixed skin yasmine FINAL  Left great toe (5/15) Acinetobacter baumannii  Sputum culture (5/19) Rare normal respiratory yasmine FINAL     CXR (6/3) No significant change    Assessment:     Active Problems:    Sacral ulcer ([address] (5/14/2022)    1. Sacral wound infection secondary now to Acinetobacter baumannii and Enterococcus faecium, on Vancomycin and Unasyn, status post excisional wound debridement to the bone, Day #15 Vancomycin and Day #10 Unasyn. 2. Sepsis with persistent leukocytosis, elevated procal and CRP  3. Left foot wound, culture growing Acinetobacter baumannii, status post TMA  4. Possible ischemic hepatitiis with transaminitis following hypotensive episode, resolving  5. Positive blood cultures for Coagulase negative Staphylococci, probable contaminant  6. Hepatitis C antibody positive, resolved (negative HCV viral level)  7. New onset cardiomyopathy  8. Candida UTI with marked pyuria and yeasts, Day #3 IV Fluconazole    Comment:  WBC still decreasing today along with CRP. Plan:   1. Continue IV Unasyn and Vancomycin (for E. Faecium)  2. Continue Fluconazole 200 mg IV daily for 11 more days  3.  Unable to locate PEG site culture and left foot wound culture     Signed By: Rashmi Correa MD     Vivien 3, 2022

## 2022-06-03 NOTE — PROGRESS NOTES
Patient restless and noncompliant with the ventilator. Propofol restarted for comfort and vent compliance.

## 2022-06-03 NOTE — PROGRESS NOTES
IMPRESSION:   1. Acute hypoxic respiratory failure remains on the ventilator  2. Sacral decubiti ulcer with Acinetobacter and Enterococcus  3. Severe sepsis with septic shock resolved on no pressors  4. Left foot wound  5. Transaminitis continue to improve  6. Shock liver  7. Hypokalemia repleted  8. Symptomatic anemia stable after transfusion  9. Cardiomyopathy  10. Mild pulmonary edema      RECOMMENDATIONS/PLAN:   1. ICU monitoring  1. Ventilator for mechanical life support and prevent respiratory arrest with protective lung strategies will decrease the rate she is on 21% FiO2 ABG acceptable we will stop sedation and change vent setting to spontaneous and will start weaning and she is off Levophed we will change vent setting to spontaneous if weaning creatinine acceptable will extubate  2. Pending loop colostomy possible surgery which has been postponed till next week  3. ET tube is 3.5 cm above the jennifer will advise 1 cm and repeat chest x-ray which has been corrected  4. Liver enzymes improved   5. Hemoglobin trending down we will continue to monitor  6. Patient underwent on 5/23 left metatarsal amputation and sacral wound debridement  7. Severe sepsis with decubiti ulcer   8. Patient is on Unasyn  9. Chest x-ray shows mild congestive changes with fluid in the minor fissure  10.  Anemia hemoglobin improved after transfusion 5/20     [x] High complexity decision making was performed  [x] See my orders for details  HPI   62-year-old lady came in because of leg pain past medical history of hypertension diabetes mellitus peripheral vascular disease CVA she is bedbound she has leg wound and also has sacral decubiti ulcer and she was scheduled for laparoscopic colostomy and sacral wound debridement and amputation of the left tarsometatarsal because of wound infection but her condition got worse her liver enzyme was elevated INR was also elevated she was intubated transferred to ICU was getting vancomycin and Unasyn started on Levophed because of low blood pressure. PMH:  has a past medical history of Anemia, Chronic kidney disease, Diabetes mellitus ([address] Hemiplegia affecting dominant side, post-stroke ([facility] [address] (05/04/2022), HTN (hypertension), Hyperkalemia, Hyperlipidemia, Ill-defined condition, Neuropathy, PAD (peripheral artery disease) ([address] Sciatica, Stroke ([address] and UTI (urinary tract infection). PSH:   has a past surgical history that includes hx other surgical (Bilateral); hx amputation (Right); hx other surgical; hx cervical fusion; hx vascular stent (Bilateral); hx vascular stent (Bilateral); hx gi; and ir insert non tunl cvc over 5 yrs (5/27/2022). FHX: family history includes Cancer in her mother; Diabetes in her mother; Hypertension in her mother. SHX:  reports that she has never smoked. She has never used smokeless tobacco. She reports previous alcohol use. She reports that she does not use drugs.     ALL: No Known Allergies     MEDS:   [x] Reviewed - As Below   [] Not reviewed    Current Facility-Administered Medications   Medication    glycopyrrolate (ROBINUL) injection 0.1 mg    [START ON 6/4/2022] Vancomycin random level to be drawn on 6/4/22 at 0400 am.    0.9% sodium chloride infusion 250 mL    0.9% sodium chloride infusion 250 mL    fluconazole (DIFLUCAN) 200mg/100 mL IVPB (premix)    aspirin chewable tablet 81 mg    NOREPINephrine (LEVOPHED) 8 mg in 0.9% NS 250ml infusion    sodium chloride (NS) flush 5-40 mL    sodium chloride (NS) flush 5-40 mL    0.9% sodium chloride infusion 250 mL    furosemide (LASIX) injection 20 mg    ondansetron (ZOFRAN) injection 4 mg    enoxaparin (LOVENOX) injection 40 mg    dextrose 5% infusion    insulin lispro (HUMALOG) injection    glucose chewable tablet 16 g    glucagon (GLUCAGEN) injection 1 mg    dextrose 10% infusion 0-250 mL    hydrALAZINE (APRESOLINE) tablet 12.5 mg    sacubitriL-valsartan (ENTRESTO) 24-26 mg tablet 1 Tablet    metoprolol succinate (TOPROL-XL) XL tablet 25 mg    ampicillin-sulbactam (UNASYN) 3 g in 0.9% sodium chloride (MBP/ADV) 100 mL MBP    dextrose 10% infusion 0-250 mL    insulin glargine (LANTUS) injection 10 Units    propofol (DIPRIVAN) 10 mg/mL infusion    0.9% sodium chloride infusion 250 mL    sodium hypochlorite (HALF STRENGTH DAKIN'S) 0.25% irrigation (bottle)    Vancomycin Pharmacy Dosing    acetaminophen (TYLENOL) tablet 650 mg    Or    acetaminophen (TYLENOL) suppository 650 mg    polyethylene glycol (MIRALAX) packet 17 g    ondansetron (ZOFRAN ODT) tablet 4 mg    famotidine (PEPCID) tablet 20 mg    acidophilus-pectin, citrus tablet 2 Tablet    albuterol-ipratropium (DUO-NEB) 2.5 MG-0.5 MG/3 ML    artificial saliva (MOUTH KOTE) 1 Spray    brimonidine (ALPHAGAN) 0.2 % ophthalmic solution 1 Drop    cholestyramine-aspartame (QUESTRAN LIGHT) packet 4 g    [Held by provider] fenofibrate nanocrystallized (TRICOR) tablet 48 mg    ferrous sulfate tablet 325 mg    gabapentin (NEURONTIN) capsule 300 mg    insulin glargine (LANTUS) injection 50 Units    latanoprost (XALATAN) 0.005 % ophthalmic solution 1 Drop    traMADoL (ULTRAM) tablet 25 mg      MAR reviewed and pertinent medications noted or modified as needed   Current Facility-Administered Medications   Medication    glycopyrrolate (ROBINUL) injection 0.1 mg    [START ON 6/4/2022] Vancomycin random level to be drawn on 6/4/22 at 0400 am.    0.9% sodium chloride infusion 250 mL    0.9% sodium chloride infusion 250 mL    fluconazole (DIFLUCAN) 200mg/100 mL IVPB (premix)    aspirin chewable tablet 81 mg    NOREPINephrine (LEVOPHED) 8 mg in 0.9% NS 250ml infusion    sodium chloride (NS) flush 5-40 mL    sodium chloride (NS) flush 5-40 mL    0.9% sodium chloride infusion 250 mL    furosemide (LASIX) injection 20 mg    ondansetron (ZOFRAN) injection 4 mg    enoxaparin (LOVENOX) injection 40 mg    dextrose 5% infusion    insulin lispro (HUMALOG) injection    glucose chewable tablet 16 g    glucagon (GLUCAGEN) injection 1 mg    dextrose 10% infusion 0-250 mL    hydrALAZINE (APRESOLINE) tablet 12.5 mg    sacubitriL-valsartan (ENTRESTO) 24-26 mg tablet 1 Tablet    metoprolol succinate (TOPROL-XL) XL tablet 25 mg    ampicillin-sulbactam (UNASYN) 3 g in 0.9% sodium chloride (MBP/ADV) 100 mL MBP    dextrose 10% infusion 0-250 mL    insulin glargine (LANTUS) injection 10 Units    propofol (DIPRIVAN) 10 mg/mL infusion    0.9% sodium chloride infusion 250 mL    sodium hypochlorite (HALF STRENGTH DAKIN'S) 0.25% irrigation (bottle)    Vancomycin Pharmacy Dosing    acetaminophen (TYLENOL) tablet 650 mg    Or    acetaminophen (TYLENOL) suppository 650 mg    polyethylene glycol (MIRALAX) packet 17 g    ondansetron (ZOFRAN ODT) tablet 4 mg    famotidine (PEPCID) tablet 20 mg    acidophilus-pectin, citrus tablet 2 Tablet    albuterol-ipratropium (DUO-NEB) 2.5 MG-0.5 MG/3 ML    artificial saliva (MOUTH KOTE) 1 Spray    brimonidine (ALPHAGAN) 0.2 % ophthalmic solution 1 Drop    cholestyramine-aspartame (QUESTRAN LIGHT) packet 4 g    [Held by provider] fenofibrate nanocrystallized (TRICOR) tablet 48 mg    ferrous sulfate tablet 325 mg    gabapentin (NEURONTIN) capsule 300 mg    insulin glargine (LANTUS) injection 50 Units    latanoprost (XALATAN) 0.005 % ophthalmic solution 1 Drop    traMADoL (ULTRAM) tablet 25 mg      PMH:  has a past medical history of Anemia, Chronic kidney disease, Diabetes mellitus ([address] Hemiplegia affecting dominant side, post-stroke ([address] (05/04/2022), HTN (hypertension), Hyperkalemia, Hyperlipidemia, Ill-defined condition, Neuropathy, PAD (peripheral artery disease) ([address] Sciatica, Stroke ([address] and UTI (urinary tract infection).   PSH:   has a past surgical history that includes hx other surgical (Bilateral); hx amputation (Right); hx other surgical; hx cervical fusion; hx vascular stent (Bilateral); hx vascular stent (Bilateral); hx gi; and ir insert non tunl cvc over 5 yrs (2022). FHX: family history includes Cancer in her mother; Diabetes in her mother; Hypertension in her mother. SHX:  reports that she has never smoked. She has never used smokeless tobacco. She reports previous alcohol use. She reports that she does not use drugs. ROS:  Unable to obtain intubated on ventilator    Hemodynamics:    CO:    CI:    CVP:    SVR:   PAP Systolic:    PAP Diastolic:    PVR:    SB68:        Ventilator Settings:      Mode Rate TV Press PEEP FiO2 PIP Min. Vent   Pressure support,Spontaneous    500 ml 15 cm H2O  5 cm H20 21 %  27 cm H2O  7.45 l/min        Vital Signs: Telemetry:    normal sinus rhythm Intake/Output:   Visit Vitals  /63 (BP 1 Location: Right upper arm, BP Patient Position: At rest)   Pulse 72   Temp 98.4 °F (36.9 °C)   Resp 12   Ht 5' 6.93\" (1.7 m)   Wt 87.8 kg (193 lb 9 oz)   SpO2 100%   Breastfeeding No   BMI 30.38 kg/m²       Temp (24hrs), Av.2 °F (37.3 °C), Min:98.4 °F (36.9 °C), Max:99.7 °F (37.6 °C)        O2 Device: Ventilator O2 Flow Rate (L/min): 1 l/min       Wt Readings from Last 4 Encounters:   22 87.8 kg (193 lb 9 oz)   04/10/22 86.6 kg (191 lb)   22 82.2 kg (181 lb 3.5 oz)   20 84.4 kg (186 lb)          Intake/Output Summary (Last 24 hours) at 6/3/2022 0838  Last data filed at 6/3/2022 0600  Gross per 24 hour   Intake 906 ml   Output 1825 ml   Net -919 ml       Last shift:      No intake/output data recorded. Last 3 shifts:  1901 -  0700  In: 2873.8 [I.V.:2148.8]  Out: 1120 [Urine:2675; Drains:150]       Physical Exam:     General: intubated on vent unresponsive on propofol  HEENT: NCAT,  Eyes: anicteric; conjunctiva clear doll's eye reflex present  Neck: no nodes, no cuff leak, trach midline; no accessory MM use.   Neck veins obscured by obesity but seems slightly engorged  Chest: no deformity,   Cardiac: R regular; no murmur;   Lungs: distant breath sounds; no wheezes rales in the bases  Abd: soft, NT, hypoactive BS  Ext: Ace edema; no joint swelling;  No clubbing  :clear urine  Neuro: Positive on propofol doll's eye reflex present flaccid extremities  Psych-  unable to assess  Skin: warm, dry, no cyanosis;   Pulses: Brachial and radial pulses intact  Capillary: Normal capillary refill      DATA:    MAR reviewed and pertinent medications noted or modified as needed  MEDS:   Current Facility-Administered Medications   Medication    glycopyrrolate (ROBINUL) injection 0.1 mg    [START ON 6/4/2022] Vancomycin random level to be drawn on 6/4/22 at 0400 am.    0.9% sodium chloride infusion 250 mL    0.9% sodium chloride infusion 250 mL    fluconazole (DIFLUCAN) 200mg/100 mL IVPB (premix)    aspirin chewable tablet 81 mg    NOREPINephrine (LEVOPHED) 8 mg in 0.9% NS 250ml infusion    sodium chloride (NS) flush 5-40 mL    sodium chloride (NS) flush 5-40 mL    0.9% sodium chloride infusion 250 mL    furosemide (LASIX) injection 20 mg    ondansetron (ZOFRAN) injection 4 mg    enoxaparin (LOVENOX) injection 40 mg    dextrose 5% infusion    insulin lispro (HUMALOG) injection    glucose chewable tablet 16 g    glucagon (GLUCAGEN) injection 1 mg    dextrose 10% infusion 0-250 mL    hydrALAZINE (APRESOLINE) tablet 12.5 mg    sacubitriL-valsartan (ENTRESTO) 24-26 mg tablet 1 Tablet    metoprolol succinate (TOPROL-XL) XL tablet 25 mg    ampicillin-sulbactam (UNASYN) 3 g in 0.9% sodium chloride (MBP/ADV) 100 mL MBP    dextrose 10% infusion 0-250 mL    insulin glargine (LANTUS) injection 10 Units    propofol (DIPRIVAN) 10 mg/mL infusion    0.9% sodium chloride infusion 250 mL    sodium hypochlorite (HALF STRENGTH DAKIN'S) 0.25% irrigation (bottle)    Vancomycin Pharmacy Dosing    acetaminophen (TYLENOL) tablet 650 mg    Or    acetaminophen (TYLENOL) suppository 650 mg    polyethylene glycol (MIRALAX) packet 17 g    ondansetron (ZOFRAN ODT) tablet 4 mg    famotidine (PEPCID) tablet 20 mg    acidophilus-pectin, citrus tablet 2 Tablet    albuterol-ipratropium (DUO-NEB) 2.5 MG-0.5 MG/3 ML    artificial saliva (MOUTH KOTE) 1 Spray    brimonidine (ALPHAGAN) 0.2 % ophthalmic solution 1 Drop    cholestyramine-aspartame (QUESTRAN LIGHT) packet 4 g    [Held by provider] fenofibrate nanocrystallized (TRICOR) tablet 48 mg    ferrous sulfate tablet 325 mg    gabapentin (NEURONTIN) capsule 300 mg    insulin glargine (LANTUS) injection 50 Units    latanoprost (XALATAN) 0.005 % ophthalmic solution 1 Drop    traMADoL (ULTRAM) tablet 25 mg        Labs:    Recent Labs     22  0600 22  0445   WBC 12.0* 13.5*  --  14.7*   HGB 9.4* 10.2*  --  8.5*    357  --  361   INR  --   --  1.5*  --      Recent Labs     22  0321 22  0445    141 144   K 3.3* 3.5 3.6   * 113* 115*   CO2 22 20* 22   GLU 86 174* 114*   BUN 22* 24* 27*   CREA 0.70 0.79 0.77   CA 8.9 9.0 8.5   MG 1.8  --  1.8   PHOS 2.8  --  3.1   ALB 1.4* 1.5* 1.5*   ALT 51 61 54     Recent Labs     22  0435   PH 7.44   PCO2 29*   PO2 79   HCO3 22   FIO2 21.0    AC 12  PEEP 5 FiO2 30%   echo ejection fraction 20% mild mitral regurg left atrium 3.5 cm RVSP 35  , 1313, 1361    Lab Results   Component Value Date/Time    Culture result: Heavy  Mixed skin yasmine isolated   2022 06:15 PM    Culture result: Rare Normal respiratory yasmine 2022 02:48 PM    Culture result: Heavy Acinetobacter baumannii complex 05/15/2022 07:15 PM    Culture result: Heavy Diphtheroids 05/15/2022 07:15 PM     Lab Results   Component Value Date/Time    TSH 2.880 2016 10:13 AM        Imagin/22 chest x-ray with ET tube in place hazy accentuated basilar markings with small amount of fluid in the minor fissure  Results from Hospital Encounter encounter on 22    XR CHEST PORT    Narrative  XR CHEST PORT    Comparison:  6/2/2022    Single view:  Examination limited by the patient's body habitus, AP portable  technique, and patient rotation to the right. Endotracheal tube tip 4.6 cm above  the jennifer. Right central line tip projects over the lower superior vena cava. The cardiac silhouette remains large. Stable hemodynamic status. No pneumothorax  or large pleural effusion apparent. Impression  No significant change. Results from [place] encounter on 05/14/22    CT ABD PELV WO CONT    Narrative  CT ABD PELV WO CONT    Technique: Noncontrasted CT scan of the abdomen and pelvis was performed  utilizing transaxial images obtained from the dome of the diaphragm to the pubic  symphysis. Coronal and sagittal reconstructions were generated. Dose Reduction:  All CT scans at this facility are performed using dose reduction optimization  techniques as appropriate to a performed exam including the following: Automated  exposure control, adjustments of the mA and/or kV according to patient size, or  use of iterative reconstruction technique. Comparison:  2/25/2022. Findings:  Small-moderate pleural effusions are present with bibasilar  atelectasis. Atherosclerotic calcifications are again evident including coronary  artery calcifications. Gastrostomy tube tip in the gastric antrum. The liver, spleen, pancreas, and  adrenal glands are unremarkable for noncontrasted technique. Stable lobulated  kidneys. Negative for hydronephrosis. Gallbladder is unremarkable. No biliary  duct dilatation apparent. The abdominal aorta is normal in caliber. There is no evidence of bowel obstruction. Small volume ascites has developed. The urinary bladder is decompressed by a Bell catheter. The uterus is  unremarkable. The appendix is normal.    There is mild body wall edema. No suspicious lytic or blastic lesion evident.     Impression  Third spacing of fluids with small volume ascites, small-moderate  pleural effusions, and mild body wall edema. Bibasilar atelectasis. 5/20 intubated on ventilator we will continue current vent settings patient is supposed to go for surgery because of transaminitis elevated liver enzyme we will wait as per surgeon for sacral wound debridement and diverting loop colostomy off Levophed, improvement in AST and ALT  5/21 continue with the current vent settings ABG acceptable liver enzyme improving awaiting for the surgery  5/22 maintain ventilator as is in anticipation of surgery.   Renal function improved mild congestion on chest x-ray we will give 1 dose Lasix and potassium  5/23 patient is going for surgery today we will leave ventilator as it is INR is 1.5  5/25 patient remained on ventilator intubated doing well, patient received vitamin K fresh frozen plasma schedule for laparoscopic loop colostomy possible today  5/26 no surgery has been plans will start weaning do sedation vacation  5/27 try to do sedation vacation to wean patient blood pressure dropped still on Levophed we will continue to wean discussed with the family at bedside  5/28 remains on ventilator sedated and also on Levophed possible surgery next week  5/29 on ventilator ET tube advisement 1 to 1.5 cm we will repeat chest x-ray ABG acceptable  5/30 continue with current vent setting  5/31 awaiting for possible surgery if not we will start weaning  6/1 remain intubated on ventilator surgery has been delayed we will start weaning and plan for extubation  6/2 no surgery was planned so we will try to wean she is off Levophed we will do sedation vacation and if weaning cardio acceptable will extubate  6/3 change vent settings to spontaneous if weaning creatinine acceptable will extubate  Time of care 30 minutes

## 2022-06-03 NOTE — WOUND CARE
Negative Pressure    NAME:  Navya Payment  YOB: 1945  MEDICAL RECORD NUMBER:  [id number]  DATE:  5/14/2022     Applied Negative Pressure to surgical incision to sacrum.  [x] Applied skin barrier prep to leah-wound.  [x] Cut strips of plastic drape to picture frame wound so that leah-wound is     covered with the drape.  [x] If bridging dressing to less prominent site, cover any intact skin that will come in contact with the Negative Pressure Therapy sponge, gauze or channel drain with plastic drape. The sponge should never touch intact skin.  [x] Cut sponge, gauze or channel drain to size which will fit into the wound/ulcer bed without being forced.  [x] Be sure the sponge is large enough to hold the entire round plastic flange which is attached to the tubing. Never allow flange to be larger than the sponge or it will produce suction damaging intact skin.  Total number of individual pieces of foam used within the wound bed: 1 x contact foam; 2 x cover foam   [x] If bridging the dressing away from the primary site, be sure the bridge leads to a piece of sponge large enough to hold the entire flange without allowing any of the flange to overlap onto intact skin.  [x] Covered sponge, gauze or channel drain with plastic drape.  [x] Cut a hole in this plastic drape directly over the sponge the same size as the plastic drain tubing.  [x] Removed plastic liner from flange and apply it directly over the hole you cut.  [x] Removed the plastic cover from the flange.  [x] Attached the tubing to the wound/ulcer Negative Pressure Therapy and turn it on to be sure a vacuum is created and that there are no leaks.  [x] If air leaks occur, use plastic drape to patch them.  [] Secured Negative Pressure Therapy dressing with ace wrap loosely if located on an extremity. Maintain tubing outside of ace wrap. Tubing must not exert pressure on intact skin.     Applied per  Guidelines  WCN received notice that stool had compromised drape integrity. Wound vac dressing removed and wound irrigated thoroughly with NS. Applied stoma paste at gluteal cleft above anus. Applied new wound vac dressing. Once seal achieved, covered inferior aspect of drape with hydrocolloid and secured edge with stoma paste to protect from stool. Next dressing change will be for Tuesday 6/7/22 by [facility]. If wound vac is not working properly: 1) Check to see if track pad tubing is clogged. If so, remove track pad, apply film over exposed foam, cut dime size hole in film, and apply a new track pad. 2) If film is leaking, find area with leak and reinforce with film. 3) If leak cannot be fixed, remove dressing, apply wet/dry dressing with Dakins, and inform the WCN.          Electronically signed by Kavita Willson RN on 6/3/2022 at 3:57 PM

## 2022-06-03 NOTE — PROGRESS NOTES
General Daily Progress Note          Patient Name:   Oliverio Pablo       YOB: 1945       Age:  78 y.o. 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 Cipro    Admitted again yesterday concerning for worsening of sacral ulcer    During last admission patient was seen by infectious disease and also seen by the vascular surgeon patient had debridement of wound during the last admission    Patient seen by the infectious disease yesterday    Sacral wound infection recent cultures growing Pseudomonas resistant to Zosyn and meropenem    5/17    Patient alert awake not in distress  Patient was seen by the vascular surgeon    Vascular surgery planned for laparoscopic loop colostomy placement and sacral debridement then wound vacuum  Also try to close the wound on the left foot with TMA    5/18    Resting in the bed not in any distress  Blood sugars running high    Seen by infectious disease continue vancomycin and start on Unasyn    5/19    And went to the OR intubated because of elevated LFT and elevated INR  Surgery was canceled    On ventilator 40% O2  Propofol drip    5/20    Patient on ventilator with 30% O2  On propofol drip    Hemoglobin dropped to 6.8    Patient's daughter at the bedside    5/20    Patient on ventilator 30% O2  On propofol drip  Getting PEG tube feeding    5/21    Patient still on ventilator 30% O2  On propofol drip  Liver Function improving    5/22  On ventilator with 30% O2 on propofol drip    5/24  Awaiting tissue culture results.    On ventilator with 30% O2 on propofol drip  Left transmetatarsal amputation and Sacral wound excisional wound debridement 13 x 15 cm to the bone completed yesterday. CXR: Hazy mid and lower lung reticular markings.  Dependent small to moderate volume,  right greater than left pleural effusions  Remarkable labs   WBC: 15.2   Hgb: 8.5   Na; 148  CRP: 13.60   Procal: 0.71      5/25    Patient on ventilator with 30% O2  Seen by the vascular surgeon and plan for surgery today    5/26  Patient on ventilator with 30% O2  Hypotensive on Levophed  On propofol drip    Surgery was canceled yesterday because patient unstable hemodynamically not cleared by the anesthesia    5/27    Patient on ventilator with 30% O2  Hypotensive on Levophed  Getting PEG tube feeding    5/30     Patient on ventilator with 21% O2  On propofol drip  Off pressor    5/31    Patient on ventilator with 21% O2  On propofol drip    Off pressor  Patient have a wound vacuum      6/1    Patient scheduled for surgery today    6/2    Surgery was canceled yesterday  As overall prognosis very poor  Patient on vent on 21% O2  Getting PEG tube feeding  Wound vacuum intact      6/3    Patient still on ventilator  Seen by vascular surgeon left foot concern of ischemic changes      Objective:     Visit Vitals  /63 (BP 1 Location: Right upper arm, BP Patient Position: At rest)   Pulse 72   Temp 98.4 °F (36.9 °C)   Resp 12   Ht 5' 6.93\" (1.7 m)   Wt 87.8 kg (193 lb 9 oz)   SpO2 98%   Breastfeeding No   BMI 30.38 kg/m²        Recent Results (from the past 24 hour(s))   GLUCOSE, POC    Collection Time: 06/02/22 12:03 PM   Result Value Ref Range    Glucose (POC) 179 (H) 65 - 117 mg/dL    Performed by Bryn Peguero, POC    Collection Time: 06/02/22  5:38 PM   Result Value Ref Range    Glucose (POC) 181 (H) 65 - 117 mg/dL    Performed by Titus Moncada    CBC WITH AUTOMATED DIFF    Collection Time: 06/03/22  4:54 AM   Result Value Ref Range    WBC 12.0 (H) 3.6 - 11.0 K/uL    RBC 3.29 (L) 3.80 - 5.20 M/uL    HGB 9.4 (L) 11.5 - 16.0 g/dL    HCT 29.8 (L) 35.0 - 47.0 %    MCV 90.6 80.0 - 99.0 FL    MCH 28.6 26.0 - 34.0 PG    MCHC 31.5 30.0 - 36.5 g/dL    RDW 16.9 (H) 11.5 - 14.5 %    PLATELET 405 349 - 847 K/uL    MPV 11.5 8.9 - 12.9 FL    NRBC 0.0 0.0  WBC    ABSOLUTE NRBC 0.00 0.00 - 0.01 K/uL    NEUTROPHILS 69 32 - 75 %    LYMPHOCYTES 20 12 - 49 %    MONOCYTES 7 5 - 13 %    EOSINOPHILS 2 0 - 7 %    BASOPHILS 1 0 - 1 %    IMMATURE GRANULOCYTES 1 (H) 0 - 0.5 %    ABS. NEUTROPHILS 8.4 (H) 1.8 - 8.0 K/UL    ABS. LYMPHOCYTES 2.4 0.8 - 3.5 K/UL    ABS. MONOCYTES 0.8 0.0 - 1.0 K/UL    ABS. EOSINOPHILS 0.2 0.0 - 0.4 K/UL    ABS. BASOPHILS 0.1 0.0 - 0.1 K/UL    ABS. IMM. GRANS. 0.1 (H) 0.00 - 0.04 K/UL    DF AUTOMATED     METABOLIC PANEL, COMPREHENSIVE    Collection Time: 06/03/22  4:54 AM   Result Value Ref Range    Sodium 145 136 - 145 mmol/L    Potassium 3.3 (L) 3.5 - 5.1 mmol/L    Chloride 116 (H) 97 - 108 mmol/L    CO2 22 21 - 32 mmol/L    Anion gap 7 5 - 15 mmol/L    Glucose 86 65 - 100 mg/dL    BUN 22 (H) 6 - 20 mg/dL    Creatinine 0.70 0.55 - 1.02 mg/dL    BUN/Creatinine ratio 31 (H) 12 - 20      GFR est AA >60 >60 ml/min/1.73m2    GFR est non-AA >60 >60 ml/min/1.73m2    Calcium 8.9 8.5 - 10.1 mg/dL    Bilirubin, total 0.4 0.2 - 1.0 mg/dL    AST (SGOT) 24 15 - 37 U/L    ALT (SGPT) 51 12 - 78 U/L    Alk.  phosphatase 139 (H) 45 - 117 U/L    Protein, total 7.2 6.4 - 8.2 g/dL    Albumin 1.4 (L) 3.5 - 5.0 g/dL    Globulin 5.8 (H) 2.0 - 4.0 g/dL    A-G Ratio 0.2 (L) 1.1 - 2.2     PROCALCITONIN    Collection Time: 06/03/22  4:54 AM   Result Value Ref Range    Procalcitonin 0.31 (H) 0 ng/mL   C REACTIVE PROTEIN, QT    Collection Time: 06/03/22  4:54 AM   Result Value Ref Range    C-Reactive protein 13.60 (H) 0.00 - 0.60 mg/dL   MAGNESIUM    Collection Time: 06/03/22  4:54 AM   Result Value Ref Range    Magnesium 1.8 1.6 - 2.4 mg/dL   PHOSPHORUS    Collection Time: 06/03/22  4:54 AM   Result Value Ref Range    Phosphorus 2.8 2.6 - 4.7 mg/dL   GLUCOSE, POC    Collection Time: 06/03/22  5:27 AM   Result Value Ref Range    Glucose (POC) 79 65 - 117 mg/dL    Performed by [address] [place] [address]      [email or web address]@      Review of Systems    Not a good historian e. Physical Exam:      Constitutional: On ventilator  HENT:   Head: Normocephalic and atraumatic. Eyes: Pupils are equal, round, and reactive to light. EOM are normal.   Cardiovascular: Normal rate, regular rhythm and normal heart sounds. Pulmonary/Chest: Breath sounds normal. No wheezes. No rales. Exhibits no tenderness. Abdominal: Soft. Bowel sounds are normal. There is no abdominal tenderness. There is no rebound and no guarding. Musculoskeletal: Normal range of motion. Neurological: On ventilator   skin sacral decubitus ulcer and left foot wound right big toe amputation    XR CHEST PORT   Final Result   No significant change. XR CHEST PORT   Final Result   No significant change. XR CHEST PORT   Final Result      XR CHEST PORT   Final Result      XR CHEST PORT   Final Result   No interval change or acute process. XR CHEST PORT   Final Result   ET tube retracted from previous, with tip now at the level of the   thoracic inlet, 8-9 cm above the jennifer. Stable appearance of right central   line. Stable mild enlargement of cardiopericardial silhouette. No evidence of   pulmonary edema, air space pneumonia, or pleural effusion. No evidence of   pneumothorax. Some structures are partially obscured by overlying monitoring   electrodes. XR CHEST PORT   Final Result   Basilar airspace disease, possibly subsegmental atelectasis, stable. XR CHEST PORT   Final Result   Cardiomegaly with vascular congestion. Stable appearance of ET tube. Right central line placed, without radiographic evidence of complication. IR INSERT NON TUNL CVC OVER 5 YRS   Final Result   Successful placement of a triple-lumen central venous catheter. The   catheter is ready for use.       XR CHEST PORT   Final Result      XR CHEST PORT   Final Result      XR CHEST PORT   Final Result      CT ABD PELV WO CONT   Final Result   Third spacing of fluids with small volume ascites, small-moderate   pleural effusions, and mild body wall edema. Bibasilar atelectasis. XR CHEST PORT   Final Result   Similar findings compared to single view chest 1 day earlier. XR CHEST PORT   Final Result   Findings/IMPRESSION: Stable appearance of endotracheal tube. Stable mild   enlargement of cardiomediastinal silhouette. Central vascular congestion with   bilateral perihilar and basilar opacities, consistent with edema and/or   pneumonia, right greater than left. Possible right pleural effusion. No   pneumothorax. XR CHEST PORT   Final Result   Bibasilar opacities, possibly increased on the right. XR CHEST PORT   Final Result   No significant change allowing for difference in technique and   positioning. Single portable AP view compared to yesterday. Suboptimal inspiratory film   compromises visualization of the lower lung fields. Monitoring equipment   overlies the body. The tip of the tube is approximately 3 cm above the jennifer. Mild apparent cardiomegaly. Left heart border and left diaphragm obscured. Hazy airspace opacification both lung bases may be a combination of atelectasis,   pneumonia, or edema. No large effusion. Negative for pneumothorax. XR CHEST PORT   Final Result   No significant change allowing for difference in technique and   positioning. Single portable AP view compared to yesterday. Rotation to the right. Monitoring   equipment overlies the body. Suboptimal inspiratory film compromises   visualization of the lower lung fields. Mild cardiomegaly. The tip of the ET tube is approximately 3 cm above the jennifer. Mild basal interstitial edema. No new consolidation. No pleural effusion or   pneumothorax. XR CHEST PORT   Final Result   1.   The endotracheal tube is in satisfactory position. 2.  There has been a partial interval resolution in the pulmonary interstitial   edema pattern. XR CHEST PORT   Final Result   Ill-defined haziness in the mid to lower lung zones suspect for mild   atelectatic changes and/or interstitial fluid or pleural fluid. US ABD LTD   Final Result   1. Question pericholecystic fluid. No identified gallstones or sludge. 2. Right pleural effusion. 3. Increased right renal echotexture for medical renal disease. XR CHEST PORT   Final Result   Intubation. Findings suggesting hydrostatic edema. XR CHEST PORT   Final Result   No evidence of an acute cardiopulmonary process. IR FLUORO GUIDE PLC CVAD    (Results Pending)   IR US GUIDED VASCULAR ACCESS    (Results Pending)        Recent Results (from the past 24 hour(s))   GLUCOSE, POC    Collection Time: 06/02/22 12:03 PM   Result Value Ref Range    Glucose (POC) 179 (H) 65 - 117 mg/dL    Performed by Yudy Bourgeois, POC    Collection Time: 06/02/22  5:38 PM   Result Value Ref Range    Glucose (POC) 181 (H) 65 - 117 mg/dL    Performed by Anastasia Wellington    CBC WITH AUTOMATED DIFF    Collection Time: 06/03/22  4:54 AM   Result Value Ref Range    WBC 12.0 (H) 3.6 - 11.0 K/uL    RBC 3.29 (L) 3.80 - 5.20 M/uL    HGB 9.4 (L) 11.5 - 16.0 g/dL    HCT 29.8 (L) 35.0 - 47.0 %    MCV 90.6 80.0 - 99.0 FL    MCH 28.6 26.0 - 34.0 PG    MCHC 31.5 30.0 - 36.5 g/dL    RDW 16.9 (H) 11.5 - 14.5 %    PLATELET 262 147 - 291 K/uL    MPV 11.5 8.9 - 12.9 FL    NRBC 0.0 0.0  WBC    ABSOLUTE NRBC 0.00 0.00 - 0.01 K/uL    NEUTROPHILS 69 32 - 75 %    LYMPHOCYTES 20 12 - 49 %    MONOCYTES 7 5 - 13 %    EOSINOPHILS 2 0 - 7 %    BASOPHILS 1 0 - 1 %    IMMATURE GRANULOCYTES 1 (H) 0 - 0.5 %    ABS. NEUTROPHILS 8.4 (H) 1.8 - 8.0 K/UL    ABS. LYMPHOCYTES 2.4 0.8 - 3.5 K/UL    ABS. MONOCYTES 0.8 0.0 - 1.0 K/UL    ABS. EOSINOPHILS 0.2 0.0 - 0.4 K/UL    ABS. BASOPHILS 0.1 0.0 - 0.1 K/UL    ABS. IMM. Alex Furry. 0.1 (H) 0.00 - 0.04 K/UL    DF AUTOMATED     METABOLIC PANEL, COMPREHENSIVE    Collection Time: 06/03/22  4:54 AM   Result Value Ref Range    Sodium 145 136 - 145 mmol/L    Potassium 3.3 (L) 3.5 - 5.1 mmol/L    Chloride 116 (H) 97 - 108 mmol/L    CO2 22 21 - 32 mmol/L    Anion gap 7 5 - 15 mmol/L    Glucose 86 65 - 100 mg/dL    BUN 22 (H) 6 - 20 mg/dL    Creatinine 0.70 0.55 - 1.02 mg/dL    BUN/Creatinine ratio 31 (H) 12 - 20      GFR est AA >60 >60 ml/min/1.73m2    GFR est non-AA >60 >60 ml/min/1.73m2    Calcium 8.9 8.5 - 10.1 mg/dL    Bilirubin, total 0.4 0.2 - 1.0 mg/dL    AST (SGOT) 24 15 - 37 U/L    ALT (SGPT) 51 12 - 78 U/L    Alk.  phosphatase 139 (H) 45 - 117 U/L    Protein, total 7.2 6.4 - 8.2 g/dL    Albumin 1.4 (L) 3.5 - 5.0 g/dL    Globulin 5.8 (H) 2.0 - 4.0 g/dL    A-G Ratio 0.2 (L) 1.1 - 2.2     PROCALCITONIN    Collection Time: 06/03/22  4:54 AM   Result Value Ref Range    Procalcitonin 0.31 (H) 0 ng/mL   C REACTIVE PROTEIN, QT    Collection Time: 06/03/22  4:54 AM   Result Value Ref Range    C-Reactive protein 13.60 (H) 0.00 - 0.60 mg/dL   MAGNESIUM    Collection Time: 06/03/22  4:54 AM   Result Value Ref Range    Magnesium 1.8 1.6 - 2.4 mg/dL   PHOSPHORUS    Collection Time: 06/03/22  4:54 AM   Result Value Ref Range    Phosphorus 2.8 2.6 - 4.7 mg/dL   GLUCOSE, POC    Collection Time: 06/03/22  5:27 AM   Result Value Ref Range    Glucose (POC) 79 65 - 117 mg/dL    Performed by [facility]        Results     Procedure Component Value Units Date/Time    CULTURE, Aileen Orlando STAIN [[id number]]     Order Status: Sent Specimen: Wound from PEG Site     CULTURE, Aileen Johanny STAIN [[id number]]     Order Status: Sent Specimen: Wound from Foot     CULTURE, Aileen Shawneebetzy STAIN [[id number]] Collected: 05/30/22 1800    Order Status: Canceled Specimen: Sacral Wound     CULTURE, TISSUE W [place] Expose [[id number]] Collected: 05/23/22 1815    Order Status: Completed Specimen: Tissue Updated: 05/26/22 7893 Special Requests: No Special Requests        GRAM STAIN Rare WBCs seen         Few Gram Negative Rods        Culture result: Heavy  Mixed skin yasmine isolated              Labs:     Recent Labs     06/03/22  0454 06/02/22  0321   WBC 12.0* 13.5*   HGB 9.4* 10.2*   HCT 29.8* 31.3*    357     Recent Labs     06/03/22  0454 06/02/22  0321 06/01/22  0445    141 144   K 3.3* 3.5 3.6   * 113* 115*   CO2 22 20* 22   BUN 22* 24* 27*   CREA 0.70 0.79 0.77   GLU 86 174* 114*   CA 8.9 9.0 8.5   MG 1.8  --  1.8   PHOS 2.8  --  3.1     Recent Labs     06/03/22  0454 06/02/22 0321 06/01/22  0445   ALT 51 61 54   * 128* 101   TBILI 0.4 0.5 0.5   TP 7.2 7.2 6.3*   ALB 1.4* 1.5* 1.5*   GLOB 5.8* 5.7* 4.8*     Recent Labs     06/01/22  0600   INR 1.5*   PTP 17.9*      No results for input(s): FE, TIBC, PSAT, FERR in the last 72 hours. No results found for: FOL, RBCF   Recent Labs     06/01/22  0435   PH 7.44   PCO2 29*   PO2 79     No results for input(s): CPK, CKNDX, TROIQ in the last 72 hours.     No lab exists for component: CPKMB  Lab Results   Component Value Date/Time    Cholesterol, total 124 03/08/2022 07:40 AM    HDL Cholesterol 30 03/08/2022 07:40 AM    LDL,Direct 79 06/28/2021 12:00 AM    LDL, calculated 44.8 03/08/2022 07:40 AM    Triglyceride 202 (H) 05/16/2022 06:20 AM    CHOL/HDL Ratio 4.1 03/08/2022 07:40 AM     Lab Results   Component Value Date/Time    Glucose (POC) 79 06/03/2022 05:27 AM    Glucose (POC) 181 (H) 06/02/2022 05:38 PM    Glucose (POC) 179 (H) 06/02/2022 12:03 PM    Glucose (POC) 180 (H) 06/02/2022 05:31 AM    Glucose (POC) 120 (H) 06/02/2022 12:36 AM     Lab Results   Component Value Date/Time    Color Yellow/Straw 05/31/2022 05:56 AM    Appearance Turbid (A) 05/31/2022 05:56 AM    Specific gravity 1.006 05/31/2022 05:56 AM    pH (UA) 5.0 05/31/2022 05:56 AM    Protein 30 (A) 05/31/2022 05:56 AM    Glucose Negative 05/31/2022 05:56 AM    Ketone Negative 05/31/2022 05:56 AM    Bilirubin Negative 05/31/2022 05:56 AM    Urobilinogen 0.1 05/31/2022 05:56 AM    Nitrites Negative 05/31/2022 05:56 AM    Leukocyte Esterase Large (A) 05/31/2022 05:56 AM    Bacteria Negative 05/31/2022 05:56 AM    WBC >100 (H) 05/31/2022 05:56 AM    RBC  05/31/2022 05:56 AM         Assessment:   Acute respiratory failure on ventilator 30% O2  Sacral decubitus ulcer postdebridement  Sepsis with leukocytosis elevated procalcitonin and CRP  Left foot wound  Recent removal of left great toe and second toe  CVA with PEG tube placement  History of aspiration pneumonia  History of chronic kidney disease  CAD with ejection fraction about 20 to 25%  Hypertension  Hyperlipidemia  Anemia of chronic disease  Hyperkalemia  Static post transfusion  Uncontrolled diabetes  Hepatic shock/improving  Coagulopathy  Elevated  Troponin  Bacteremia with coagulase-negative Staphylococcus  Procedure:  1. Left transmetatarsal amputation. 2.  Sacral wound excisional wound debridement 13 x 15 cm to the bone. Hypotension on Levophed  Hypokalemia/replace potassium  Plan:     Unasyn 3 g every 8 hours  Aspirin 81 mg daily  Questran 4 g twice a day  Pepcid 20 twice daily  Ferrous sulfate 325 mg twice a day  Lovenox 40 subcu daily  Lasix 20 mg IV every 12 hours  Pepcid 20 twice daily  Ferrous sulfate 325 mg twice a day  Lantus 50 units subcu daily and 10 units in p.m.   Gabapentin 300 mg twice a day  Hydralazine 12.5 mg 3 times a day  Metoprolol 25 daily  Vitamin K 10 mg daily  Replace potassium  Entresto 1 tablet twice a day  Vancomycin with pharmacy protocol  Replace potassium  2 A of sodium bicarb  Place potassium    Repeat  the labs overall prognosis very poor        laparoscopic loop colostomy for fecal diversion canceled        Continue current medications    Discussed with ICU nurse    Discussed with the patient daughter daughters   Detailed discussion regarding poor prognosis  And future plan         Current Facility-Administered Medications:     glycopyrrolate (ROBINUL) injection 0.1 mg, 0.1 mg, IntraVENous, TID, Martínez Crespo MD, 0.1 mg at 06/02/22 2159    [START ON 6/4/2022] Vancomycin random level to be drawn on 6/4/22 at 0400 am., , Other, Aileen Galvan MD    0.9% sodium chloride infusion 250 mL, 250 mL, IntraVENous, PRN, Elham Bob MD    0.9% sodium chloride infusion 250 mL, 250 mL, IntraVENous, PRN, Eunice Soto MD    fluconazole (DIFLUCAN) 200mg/100 mL IVPB (premix), 200 mg, IntraVENous, Q24H, Mariella Goyal MD, Last Rate: 100 mL/hr at 06/02/22 0952, 200 mg at 06/02/22 8812    aspirin chewable tablet 81 mg, 81 mg, Per G Tube, DAILY, Israel Soto MD, 81 mg at 06/02/22 0952    NOREPINephrine (LEVOPHED) 8 mg in 0.9% NS 250ml infusion, 0.5-16 mcg/min, IntraVENous, TITRATE, Israel Soto MD, Paused at 05/29/22 2046    sodium chloride (NS) flush 5-40 mL, 5-40 mL, IntraVENous, Q8H, Felipe Matthew MD, 10 mL at 06/03/22 0600    sodium chloride (NS) flush 5-40 mL, 5-40 mL, IntraVENous, PRN, Jj Root MD, 10 mL at 05/31/22 2213    0.9% sodium chloride infusion 250 mL, 250 mL, IntraVENous, PRN, Elham Bob MD    furosemide (LASIX) injection 20 mg, 20 mg, IntraVENous, Q12H, Elham Bob MD, 20 mg at 06/02/22 2209    [DISCONTINUED] ondansetron (ZOFRAN ODT) tablet 4 mg, 4 mg, Oral, Q8H PRN **OR** ondansetron (ZOFRAN) injection 4 mg, 4 mg, IntraVENous, Q6H PRN, Elham Bob MD    enoxaparin (LOVENOX) injection 40 mg, 40 mg, SubCUTAneous, DAILY, Elham Bob MD, 40 mg at 06/02/22 0951    dextrose 5% infusion, 30 mL/hr, IntraVENous, CONTINUOUS, Kalpesh Lara MD, Last Rate: 30 mL/hr at 06/03/22 0058, 30 mL/hr at 06/03/22 0058    insulin lispro (HUMALOG) injection, , SubCUTAneous, Q6H, Elham Bob MD, 1 Units at 06/02/22 2350    glucose chewable tablet 16 g, 4 Tablet, Oral, PRN, Media Casino, MD    glucagon [facility]) injection 1 mg, 1 mg, IntraMUSCular, PRN, Mary Bob MD    dextrose 10% infusion 0-250 mL, 0-250 mL, IntraVENous, PRN, Mary Bob MD, 250 mL at 06/01/22 1217    hydrALAZINE (APRESOLINE) tablet 12.5 mg, 12.5 mg, Oral, TID, Mary Bob MD, 12.5 mg at 06/02/22 2201    sacubitriL-valsartan (ENTRESTO) 24-26 mg tablet 1 Tablet, 1 Tablet, Oral, Q12H, Mary Bob MD, 1 Tablet at 06/02/22 2201    metoprolol succinate (TOPROL-XL) XL tablet 25 mg, 25 mg, Oral, DAILY, Mary Bob MD, 25 mg at 05/30/22 0919    ampicillin-sulbactam (UNASYN) 3 g in 0.9% sodium chloride (MBP/ADV) 100 mL MBP, 3 g, IntraVENous, Q8H, Mary Bob MD, Last Rate: 200 mL/hr at 06/03/22 0531, 3 g at 06/03/22 0531    dextrose 10% infusion 0-250 mL, 0-250 mL, IntraVENous, PRN, Mary Bob MD, 125 mL at 05/19/22 0537    insulin glargine (LANTUS) injection 10 Units, 10 Units, SubCUTAneous, QHS, Mary Bob MD, 10 Units at 06/02/22 2220    propofol (DIPRIVAN) 10 mg/mL infusion, 0-50 mcg/kg/min, IntraVENous, TITRATE, Mary Bob MD, Last Rate: 9.5 mL/hr at 06/03/22 0051, 20 mcg/kg/min at 06/03/22 0051    0.9% sodium chloride infusion 250 mL, 250 mL, IntraVENous, PRN, Mary Bob MD, Last Rate: 15 mL/hr at 05/22/22 0820, Rate Verify at 05/22/22 0820    sodium hypochlorite (HALF STRENGTH DAKIN'S) 0.25% irrigation (bottle), , Topical, BID, Mary Bob MD, Given at 06/01/22 2202    Vancomycin Pharmacy Dosing, , Other, Rx Dosing/Monitoring, Mary Bob MD    acetaminophen (TYLENOL) tablet 650 mg, 650 mg, Oral, Q6H PRN, 650 mg at 05/22/22 0556 **OR** acetaminophen (TYLENOL) suppository 650 mg, 650 mg, Rectal, Q6H PRN, Mary Bob MD, 650 mg at 05/16/22 2223    polyethylene glycol (MIRALAX) packet 17 g, 17 g, Oral, DAILY PRN, Mary Bob MD    ondansetron (ZOFRAN ODT) tablet 4 mg, 4 mg, Oral, Q8H PRN **OR** [DISCONTINUED] ondansetron (ZOFRAN) injection 4 mg, 4 mg, IntraVENous, Q6H PRN, Mary Bob MD    famotidine (PEPCID) tablet 20 mg, 20 mg, Oral, BID, Yonathan Bob MD, 20 mg at 06/02/22 2202    acidophilus-pectin, citrus tablet 2 Tablet, 2 Tablet, Oral, DAILY, Yonathan oBb MD, 2 Tablet at 06/02/22 0952    albuterol-ipratropium (DUO-NEB) 2.5 MG-0.5 MG/3 ML, 3 mL, Nebulization, Q6H PRN, Yonathan Bob MD    artificial saliva (MOUTH KOTE) 1 Spray, 1 Spray, Oral, TID, Yonathan Bob MD, 1 [place] at 06/02/22 2214    brimonidine (ALPHAGAN) 0.2 % ophthalmic solution 1 Drop, 1 Drop, Both Eyes, TID, Yonathan Bob MD, 1 Drop at 06/02/22 2216    cholestyramine-aspartame (QUESTRAN LIGHT) packet 4 g, 4 g, Oral, BID WITH MEALS, Yonathan Bob MD, 4 g at 06/02/22 1756    [Held by provider] fenofibrate nanocrystallized (TRICOR) tablet 48 mg, 48 mg, Oral, DAILY, Yonathan Bob MD, 48 mg at 05/19/22 7841    ferrous sulfate tablet 325 mg, 325 mg, Oral, BID, Yonathan Bob MD, 325 mg at 06/02/22 2201    gabapentin (NEURONTIN) capsule 300 mg, 300 mg, Oral, BID, Yonathan Bob MD, 300 mg at 06/02/22 2201    insulin glargine (LANTUS) injection 50 Units, 50 Units, SubCUTAneous, DAILY, Yonathan Bob MD, 50 Units at 06/02/22 0951    latanoprost (XALATAN) 0.005 % ophthalmic solution 1 Drop, 1 Drop, Right Eye, QPM, Yonathan Bob MD, 1 Drop at 06/02/22 5816    traMADoL (ULTRAM) tablet 25 mg, 25 mg, Oral, Q12H PRN, Yonathan Bob MD, 25 mg at 05/18/22 7198

## 2022-06-03 NOTE — PROGRESS NOTES
Clinical chart reviewed. It appears patient has been extubated and is now on 2L of oxygen. She remains in the ICU. Clinical updates have been sent to [facility] and [facility] as those are the only two Skilled Nursing Facilities that have accepted the patient. Once patient is more medically stable they will need to pursue auth and have PT and OT. On the patient's last admission to the hospital SNF auth was denied. Patient does have medicaid available to use if family is agreeable to LTC and the facilities have a LTC bed.

## 2022-06-04 NOTE — PROGRESS NOTES
General Daily Progress Note          Patient Name:   Britta Aquino       YOB: 1945       Age:  78 y.o. 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 Cipro    Admitted again yesterday concerning for worsening of sacral ulcer    During last admission patient was seen by infectious disease and also seen by the vascular surgeon patient had debridement of wound during the last admission    Patient seen by the infectious disease yesterday    Sacral wound infection recent cultures growing Pseudomonas resistant to Zosyn and meropenem    5/17    Patient alert awake not in distress  Patient was seen by the vascular surgeon    Vascular surgery planned for laparoscopic loop colostomy placement and sacral debridement then wound vacuum  Also try to close the wound on the left foot with TMA    5/18    Resting in the bed not in any distress  Blood sugars running high    Seen by infectious disease continue vancomycin and start on Unasyn    5/19    And went to the OR intubated because of elevated LFT and elevated INR  Surgery was canceled    On ventilator 40% O2  Propofol drip    5/20    Patient on ventilator with 30% O2  On propofol drip    Hemoglobin dropped to 6.8    Patient's daughter at the bedside    5/20    Patient on ventilator 30% O2  On propofol drip  Getting PEG tube feeding    5/21    Patient still on ventilator 30% O2  On propofol drip  Liver Function improving    5/22  On ventilator with 30% O2 on propofol drip    5/24  Awaiting tissue culture results.    On ventilator with 30% O2 on propofol drip  Left transmetatarsal amputation and Sacral wound excisional wound debridement 13 x 15 cm to the bone completed yesterday. CXR: Hazy mid and lower lung reticular markings.  Dependent small to moderate volume,  right greater than left pleural effusions  Remarkable labs   WBC: 15.2   Hgb: 8.5   Na; 148  CRP: 13.60   Procal: 0.71      5/25    Patient on ventilator with 30% O2  Seen by the vascular surgeon and plan for surgery today    5/26  Patient on ventilator with 30% O2  Hypotensive on Levophed  On propofol drip    Surgery was canceled yesterday because patient unstable hemodynamically not cleared by the anesthesia    5/27    Patient on ventilator with 30% O2  Hypotensive on Levophed  Getting PEG tube feeding    5/30     Patient on ventilator with 21% O2  On propofol drip  Off pressor    5/31    Patient on ventilator with 21% O2  On propofol drip    Off pressor  Patient have a wound vacuum      6/1    Patient scheduled for surgery today    6/2    Surgery was canceled yesterday  As overall prognosis very poor  Patient on vent on 21% O2  Getting PEG tube feeding  Wound vacuum intact      6/3    Patient still on ventilator  Seen by vascular surgeon left foot concern of ischemic changes    6/4    Patient extubated /tachypneic daughter at the bedside      Objective:     Visit Vitals  /69 (BP 1 Location: Right upper arm, BP Patient Position: At rest)   Pulse (!) 104   Temp 99.2 °F (37.3 °C)   Resp (!) 104   Ht 5' 6.93\" (1.7 m)   Wt 87.8 kg (193 lb 9 oz)   SpO2 96%   Breastfeeding No   BMI 30.38 kg/m²        Recent Results (from the past 24 hour(s))   GLUCOSE, POC    Collection Time: 06/03/22 10:45 AM   Result Value Ref Range    Glucose (POC) 88 65 - 117 mg/dL    Performed by Georgian Schirmer    GLUCOSE, POC    Collection Time: 06/03/22  5:58 PM   Result Value Ref Range    Glucose (POC) 170 (H) 65 - 117 mg/dL    Performed by [address] [place] [address] Dr Zelaya, POC    Collection Time: 06/03/22 10:05 PM   Result Value Ref Range    Glucose (POC) 195 (H) 65 - 117 mg/dL    Performed by [facility], RANDOM    Collection Time: 06/04/22  4:00 AM   Result Value Ref Range    Vancomycin, random 13.6 ug/mL   CBC WITH AUTOMATED DIFF    Collection Time: 06/04/22  4:00 AM   Result Value Ref Range    WBC 17.7 (H) 3.6 - 11.0 K/uL    RBC 3.48 (L) 3.80 - 5.20 M/uL    HGB 10.1 (L) 11.5 - 16.0 g/dL    HCT 31.4 (L) 35.0 - 47.0 %    MCV 90.2 80.0 - 99.0 FL    MCH 29.0 26.0 - 34.0 PG    MCHC 32.2 30.0 - 36.5 g/dL    RDW 17.0 (H) 11.5 - 14.5 %    PLATELET 771 460 - 551 K/uL    MPV 10.4 8.9 - 12.9 FL    NRBC 0.1 (H) 0.0  WBC    ABSOLUTE NRBC 0.02 (H) 0.00 - 0.01 K/uL    NEUTROPHILS 79 (H) 32 - 75 %    LYMPHOCYTES 12 12 - 49 %    MONOCYTES 5 5 - 13 %    EOSINOPHILS 2 0 - 7 %    BASOPHILS 1 0 - 1 %    IMMATURE GRANULOCYTES 1 (H) 0 - 0.5 %    ABS. NEUTROPHILS 14.2 (H) 1.8 - 8.0 K/UL    ABS. LYMPHOCYTES 2.1 0.8 - 3.5 K/UL    ABS. MONOCYTES 0.9 0.0 - 1.0 K/UL    ABS. EOSINOPHILS 0.3 0.0 - 0.4 K/UL    ABS. BASOPHILS 0.1 0.0 - 0.1 K/UL    ABS. IMM. GRANS. 0.1 (H) 0.00 - 0.04 K/UL    DF AUTOMATED     METABOLIC PANEL, COMPREHENSIVE    Collection Time: 06/04/22  4:00 AM   Result Value Ref Range    Sodium 141 136 - 145 mmol/L    Potassium 4.0 3.5 - 5.1 mmol/L    Chloride 113 (H) 97 - 108 mmol/L    CO2 21 21 - 32 mmol/L    Anion gap 7 5 - 15 mmol/L    Glucose 192 (H) 65 - 100 mg/dL    BUN 22 (H) 6 - 20 mg/dL    Creatinine 0.87 0.55 - 1.02 mg/dL    BUN/Creatinine ratio 25 (H) 12 - 20      GFR est AA >60 >60 ml/min/1.73m2    GFR est non-AA >60 >60 ml/min/1.73m2    Calcium 9.0 8.5 - 10.1 mg/dL    Bilirubin, total 0.5 0.2 - 1.0 mg/dL    AST (SGOT) 18 15 - 37 U/L    ALT (SGPT) 48 12 - 78 U/L    Alk.  phosphatase 130 (H) 45 - 117 U/L    Protein, total 7.5 6.4 - 8.2 g/dL    Albumin 1.5 (L) 3.5 - 5.0 g/dL    Globulin 6.0 (H) 2.0 - 4.0 g/dL    A-G Ratio 0.3 (L) 1.1 - 2.2     GLUCOSE, POC    Collection Time: 06/04/22  6:21 AM   Result Value Ref Range    Glucose (POC) 186 (H) 65 - 117 mg/dL    Performed by [facility]      [unfilled]      Review of Systems    Not a good historian e. Physical Exam:      Constitutional: On ventilator  HENT:   Head: Normocephalic and atraumatic. Eyes: Pupils are equal, round, and reactive to light. EOM are normal.   Cardiovascular: Normal rate, regular rhythm and normal heart sounds. Pulmonary/Chest: Breath sounds normal. No wheezes. No rales. Exhibits no tenderness. Abdominal: Soft. Bowel sounds are normal. There is no abdominal tenderness. There is no rebound and no guarding. Musculoskeletal: Normal range of motion. Neurological: On ventilator   skin sacral decubitus ulcer and left foot wound right big toe amputation    XR CHEST PORT   Final Result   No significant change. XR CHEST PORT   Final Result   No significant change. XR CHEST PORT   Final Result      XR CHEST PORT   Final Result      XR CHEST PORT   Final Result   No interval change or acute process. XR CHEST PORT   Final Result   ET tube retracted from previous, with tip now at the level of the   thoracic inlet, 8-9 cm above the jennifer. Stable appearance of right central   line. Stable mild enlargement of cardiopericardial silhouette. No evidence of   pulmonary edema, air space pneumonia, or pleural effusion. No evidence of   pneumothorax. Some structures are partially obscured by overlying monitoring   electrodes. XR CHEST PORT   Final Result   Basilar airspace disease, possibly subsegmental atelectasis, stable. XR CHEST PORT   Final Result   Cardiomegaly with vascular congestion. Stable appearance of ET tube. Right central line placed, without radiographic evidence of complication. IR INSERT NON TUNL CVC OVER 5 YRS   Final Result   Successful placement of a triple-lumen central venous catheter. The   catheter is ready for use.       XR CHEST PORT   Final Result      XR CHEST PORT   Final Result      XR CHEST PORT   Final Result CT ABD PELV WO CONT   Final Result   Third spacing of fluids with small volume ascites, small-moderate   pleural effusions, and mild body wall edema. Bibasilar atelectasis. XR CHEST PORT   Final Result   Similar findings compared to single view chest 1 day earlier. XR CHEST PORT   Final Result   Findings/IMPRESSION: Stable appearance of endotracheal tube. Stable mild   enlargement of cardiomediastinal silhouette. Central vascular congestion with   bilateral perihilar and basilar opacities, consistent with edema and/or   pneumonia, right greater than left. Possible right pleural effusion. No   pneumothorax. XR CHEST PORT   Final Result   Bibasilar opacities, possibly increased on the right. XR CHEST PORT   Final Result   No significant change allowing for difference in technique and   positioning. Single portable AP view compared to yesterday. Suboptimal inspiratory film   compromises visualization of the lower lung fields. Monitoring equipment   overlies the body. The tip of the tube is approximately 3 cm above the jennifer. Mild apparent cardiomegaly. Left heart border and left diaphragm obscured. Hazy airspace opacification both lung bases may be a combination of atelectasis,   pneumonia, or edema. No large effusion. Negative for pneumothorax. XR CHEST PORT   Final Result   No significant change allowing for difference in technique and   positioning. Single portable AP view compared to yesterday. Rotation to the right. Monitoring   equipment overlies the body. Suboptimal inspiratory film compromises   visualization of the lower lung fields. Mild cardiomegaly. The tip of the ET tube is approximately 3 cm above the jennifer. Mild basal interstitial edema. No new consolidation. No pleural effusion or   pneumothorax. XR CHEST PORT   Final Result   1. The endotracheal tube is in satisfactory position.    2.  There has been a partial interval resolution in the pulmonary interstitial   edema pattern. XR CHEST PORT   Final Result   Ill-defined haziness in the mid to lower lung zones suspect for mild   atelectatic changes and/or interstitial fluid or pleural fluid. US ABD LTD   Final Result   1. Question pericholecystic fluid. No identified gallstones or sludge. 2. Right pleural effusion. 3. Increased right renal echotexture for medical renal disease. XR CHEST PORT   Final Result   Intubation. Findings suggesting hydrostatic edema. XR CHEST PORT   Final Result   No evidence of an acute cardiopulmonary process. IR FLUORO GUIDE PLC CVAD    (Results Pending)   IR US GUIDED VASCULAR ACCESS    (Results Pending)        Recent Results (from the past 24 hour(s))   GLUCOSE, POC    Collection Time: 06/03/22 10:45 AM   Result Value Ref Range    Glucose (POC) 88 65 - 117 mg/dL    Performed by Lakesha Holloway    GLUCOSE, POC    Collection Time: 06/03/22  5:58 PM   Result Value Ref Range    Glucose (POC) 170 (H) 65 - 117 mg/dL    Performed by Mónica Godlberg    GLUCOSE, POC    Collection Time: 06/03/22 10:05 PM   Result Value Ref Range    Glucose (POC) 195 (H) 65 - 117 mg/dL    Performed by [facility], RAND[facility]    Collection Time: 06/04/22  4:00 AM   Result Value Ref Range    Vancomycin, random 13.6 ug/mL   CBC WITH AUTOMATED DIFF    Collection Time: 06/04/22  4:00 AM   Result Value Ref Range    WBC 17.7 (H) 3.6 - 11.0 K/uL    RBC 3.48 (L) 3.80 - 5.20 M/uL    HGB 10.1 (L) 11.5 - 16.0 g/dL    HCT 31.4 (L) 35.0 - 47.0 %    MCV 90.2 80.0 - 99.0 FL    MCH 29.0 26.0 - 34.0 PG    MCHC 32.2 30.0 - 36.5 g/dL    RDW 17.0 (H) 11.5 - 14.5 %    PLATELET 170 340 - 934 K/uL    MPV 10.4 8.9 - 12.9 FL    NRBC 0.1 (H) 0.0  WBC    ABSOLUTE NRBC 0.02 (H) 0.00 - 0.01 K/uL    NEUTROPHILS 79 (H) 32 - 75 %    LYMPHOCYTES 12 12 - 49 %    MONOCYTES 5 5 - 13 %    EOSINOPHILS 2 0 - 7 %    BASOPHILS 1 0 - 1 %    IMMATURE GRANULOCYTES 1 (H) 0 - 0.5 %    ABS. NEUTROPHILS 14.2 (H) 1.8 - 8.0 K/UL    ABS. LYMPHOCYTES 2.1 0.8 - 3.5 K/UL    ABS. MONOCYTES 0.9 0.0 - 1.0 K/UL    ABS. EOSINOPHILS 0.3 0.0 - 0.4 K/UL    ABS. BASOPHILS 0.1 0.0 - 0.1 K/UL    ABS. IMM. GRANS. 0.1 (H) 0.00 - 0.04 K/UL    DF AUTOMATED     METABOLIC PANEL, COMPREHENSIVE    Collection Time: 06/04/22  4:00 AM   Result Value Ref Range    Sodium 141 136 - 145 mmol/L    Potassium 4.0 3.5 - 5.1 mmol/L    Chloride 113 (H) 97 - 108 mmol/L    CO2 21 21 - 32 mmol/L    Anion gap 7 5 - 15 mmol/L    Glucose 192 (H) 65 - 100 mg/dL    BUN 22 (H) 6 - 20 mg/dL    Creatinine 0.87 0.55 - 1.02 mg/dL    BUN/Creatinine ratio 25 (H) 12 - 20      GFR est AA >60 >60 ml/min/1.73m2    GFR est non-AA >60 >60 ml/min/1.73m2    Calcium 9.0 8.5 - 10.1 mg/dL    Bilirubin, total 0.5 0.2 - 1.0 mg/dL    AST (SGOT) 18 15 - 37 U/L    ALT (SGPT) 48 12 - 78 U/L    Alk.  phosphatase 130 (H) 45 - 117 U/L    Protein, total 7.5 6.4 - 8.2 g/dL    Albumin 1.5 (L) 3.5 - 5.0 g/dL    Globulin 6.0 (H) 2.0 - 4.0 g/dL    A-G Ratio 0.3 (L) 1.1 - 2.2     GLUCOSE, POC    Collection Time: 06/04/22  6:21 AM   Result Value Ref Range    Glucose (POC) 186 (H) 65 - 117 mg/dL    Performed by Mckinley Cantu        Results     Procedure Component Value Units Date/Time    BRADLY, Stephen Taylor [[id number]] Collected: 06/01/22 1900    Order Status: Canceled Specimen: Wound from Foot     CULTURE, Zhang Alexander STAIN [[id number]] Collected: 06/01/22 1845    Order Status: Canceled Specimen: Wound from [facility]     CULTURE, Sherrin Pen STAIN [[id number]] Collected: 05/30/22 1800    Order Status: Canceled Specimen: Sacral Wound     CULTURE, TISSUE Parkinson Davin STAIN [[id number]] Collected: 05/23/22 1815    Order Status: Completed Specimen: Tissue Updated: 05/26/22 1510     Special Requests: No Special Requests        GRAM STAIN Rare WBCs seen         Few Gram Negative Rods        Culture result: Heavy  Mixed skin yasmine isolated              Labs:     Recent Labs 06/04/22 0400 06/03/22 0454   WBC 17.7* 12.0*   HGB 10.1* 9.4*   HCT 31.4* 29.8*    323     Recent Labs     06/04/22 0400 06/03/22 0454 06/02/22 0321    145 141   K 4.0 3.3* 3.5   * 116* 113*   CO2 21 22 20*   BUN 22* 22* 24*   CREA 0.87 0.70 0.79   * 86 174*   CA 9.0 8.9 9.0   MG  --  1.8  --    PHOS  --  2.8  --      Recent Labs     06/04/22 0400 06/03/22 0454 06/02/22 0321   ALT 48 51 61   * 139* 128*   TBILI 0.5 0.4 0.5   TP 7.5 7.2 7.2   ALB 1.5* 1.4* 1.5*   GLOB 6.0* 5.8* 5.7*     No results for input(s): INR, PTP, APTT, INREXT, INREXT in the last 72 hours. No results for input(s): FE, TIBC, PSAT, FERR in the last 72 hours. No results found for: FOL, RBCF   No results for input(s): PH, PCO2, PO2 in the last 72 hours. No results for input(s): CPK, CKNDX, TROIQ in the last 72 hours.     No lab exists for component: CPKMB  Lab Results   Component Value Date/Time    Cholesterol, total 124 03/08/2022 07:40 AM    HDL Cholesterol 30 03/08/2022 07:40 AM    LDL,Direct 79 06/28/2021 12:00 AM    LDL, calculated 44.8 03/08/2022 07:40 AM    Triglyceride 202 (H) 05/16/2022 06:20 AM    CHOL/HDL Ratio 4.1 03/08/2022 07:40 AM     Lab Results   Component Value Date/Time    Glucose (POC) 186 (H) 06/04/2022 06:21 AM    Glucose (POC) 195 (H) 06/03/2022 10:05 PM    Glucose (POC) 170 (H) 06/03/2022 05:58 PM    Glucose (POC) 88 06/03/2022 10:45 AM    Glucose (POC) 79 06/03/2022 05:27 AM     Lab Results   Component Value Date/Time    Color Yellow/Straw 05/31/2022 05:56 AM    Appearance Turbid (A) 05/31/2022 05:56 AM    Specific gravity 1.006 05/31/2022 05:56 AM    pH (UA) 5.0 05/31/2022 05:56 AM    Protein 30 (A) 05/31/2022 05:56 AM    Glucose Negative 05/31/2022 05:56 AM    Ketone Negative 05/31/2022 05:56 AM    Bilirubin Negative 05/31/2022 05:56 AM    Urobilinogen 0.1 05/31/2022 05:56 AM    Nitrites Negative 05/31/2022 05:56 AM    Leukocyte Esterase Large (A) 05/31/2022 05:56 AM Bacteria Negative 05/31/2022 05:56 AM    WBC >100 (H) 05/31/2022 05:56 AM    RBC  05/31/2022 05:56 AM         Assessment:   Acute respiratory failure extubated   sacral decubitus ulcer postdebridement  Sepsis with leukocytosis elevated procalcitonin and CRP  Left foot wound  Recent removal of left great toe and second toe  CVA with PEG tube placement  History of aspiration pneumonia  History of chronic kidney disease  CAD with ejection fraction about 20 to 25%  Hypertension  Hyperlipidemia  Anemia of chronic disease  Hyperkalemia  Static post transfusion  Uncontrolled diabetes  Hepatic shock/improving  Coagulopathy  Elevated  Troponin  Bacteremia with coagulase-negative Staphylococcus  Procedure:  1. Left transmetatarsal amputation. 2.  Sacral wound excisional wound debridement 13 x 15 cm to the bone.     Hypotension on Levophed  Hypokalemia/replace potassium  Plan:       Unasyn 3 g IV every 8 hours  Aspirin 81 mg daily  Questran 4 g twice a day  Lovenox 40 subcu daily  Pepcid 20 bid twice a day  Ferrous sulfate 300 mg twice a day  Flucanazole 200 mg daily  Lasix 40 mg every 12 hours  Gabapentin 200 mg twice a day  Robinul 0.1 mg 3 times a day  Hydralazine 12.5 mg 3 times a day  Lantus 10 units subcu at bedtime  Lantus 50 units subcu in the morning  Lopressor 12.5 mg twice a day  Entresto 24/26 1 tab twice daily  Vancomycin with pharmacy protocol  Repeat  the labs overall prognosis very poor    Detailed discussion with the patient's daughter about poor prognosis she understand    laparoscopic loop colostomy for fecal diversion canceled        Continue current medications    Discussed with ICU nurse           Current Facility-Administered Medications:     ferrous sulfate 300 mg (60 mg iron)/5 mL oral syrup 300 mg, 300 mg, Per NG tube, BID WITH MEALS, Israel Soto MD, 300 mg at 06/03/22 1717    metoprolol tartrate (LOPRESSOR) tablet 12.5 mg, 12.5 mg, Per NG tube, BID, Israel Soto MD, 12.5 mg at 06/03/22 2249    alteplase (CATHFLO) injection 2 mg, 2 mg, InterCATHeter, PRN, Tamera Chaudhary MD, 2 mg at 06/03/22 1708    glycopyrrolate (ROBINUL) injection 0.1 mg, 0.1 mg, IntraVENous, TID, Martínez Crespo MD, 0.1 mg at 06/03/22 2253    fluconazole (DIFLUCAN) 200mg/100 mL IVPB (premix), 200 mg, IntraVENous, Q24H, Mariella Goyal MD, Last Rate: 100 mL/hr at 06/03/22 1024, 200 mg at 06/03/22 1024    aspirin chewable tablet 81 mg, 81 mg, Per G Tube, DAILY, Israel Soto MD, 81 mg at 06/03/22 1022    NOREPINephrine (LEVOPHED) 8 mg in 0.9% NS 250ml infusion, 0.5-16 mcg/min, IntraVENous, TITRATE, Israel Soto MD, Paused at 05/29/22 2046    sodium chloride (NS) flush 5-40 mL, 5-40 mL, IntraVENous, Q8H, Felipe Matthew MD, 10 mL at 06/04/22 0634    sodium chloride (NS) flush 5-40 mL, 5-40 mL, IntraVENous, PRN, Jj Root MD, 10 mL at 05/31/22 2213    furosemide (LASIX) injection 20 mg, 20 mg, IntraVENous, Q12H, Elham Bob MD, 20 mg at 06/03/22 2253    [DISCONTINUED] ondansetron (ZOFRAN ODT) tablet 4 mg, 4 mg, Oral, Q8H PRN **OR** ondansetron (ZOFRAN) injection 4 mg, 4 mg, IntraVENous, Q6H PRN, Elham Bob MD    enoxaparin (LOVENOX) injection 40 mg, 40 mg, SubCUTAneous, DAILY, Elham Bob MD, 40 mg at 06/03/22 1024    dextrose 5% infusion, 30 mL/hr, IntraVENous, CONTINUOUS, Kalpesh Lara MD, Last Rate: 30 mL/hr at 06/03/22 0058, 30 mL/hr at 06/03/22 0058    insulin lispro (HUMALOG) injection, , SubCUTAneous, Q6H, Elham Bob MD, 1 Units at 06/04/22 0631    glucose chewable tablet 16 g, 4 Tablet, Oral, PRN, Elham Bob MD    glucagon [facility]) injection 1 mg, 1 mg, IntraMUSCular, PRN, Elham Bob MD    hydrALAZINE (APRESOLINE) tablet 12.5 mg, 12.5 mg, Oral, TID, Elham Bob MD, 12.5 mg at 06/03/22 2248    sacubitriL-valsartan (ENTRESTO) 24-26 mg tablet 1 Tablet, 1 Tablet, Oral, Q12H, Elham Bob MD, 1 Tablet at 06/03/22 1450   ampicillin-sulbactam (UNASYN) 3 g in 0.9% sodium chloride (MBP/ADV) 100 mL MBP, 3 g, IntraVENous, Q8H, Mouna Bob MD, Last Rate: 200 mL/hr at 06/04/22 0631, 3 g at 06/04/22 0631    dextrose 10% infusion 0-250 mL, 0-250 mL, IntraVENous, PRN, Mouna Bob MD, 125 mL at 05/19/22 0537    insulin glargine (LANTUS) injection 10 Units, 10 Units, SubCUTAneous, QHS, Mouna Bob MD, 10 Units at 06/03/22 2253    propofol (DIPRIVAN) 10 mg/mL infusion, 0-50 mcg/kg/min, IntraVENous, TITRATE, Mouna Bob MD, Last Rate: 9.5 mL/hr at 06/03/22 0051, 20 mcg/kg/min at 06/03/22 0051    0.9% sodium chloride infusion 250 mL, 250 mL, IntraVENous, PRN, Mouna Bob MD, Last Rate: 15 mL/hr at 05/22/22 0820, Rate Verify at 05/22/22 0820    Vancomycin Pharmacy Dosing, , Other, Rx Dosing/Monitoring, Mouna Bob MD    acetaminophen (TYLENOL) tablet 650 mg, 650 mg, Oral, Q6H PRN, 650 mg at 05/22/22 0556 **OR** acetaminophen (TYLENOL) suppository 650 mg, 650 mg, Rectal, Q6H PRN, Mouna Bob MD, 650 mg at 05/16/22 2223    polyethylene glycol (MIRALAX) packet 17 g, 17 g, Oral, DAILY PRN, Mouna Bob MD    ondansetron (ZOFRAN ODT) tablet 4 mg, 4 mg, Oral, Q8H PRN **OR** [DISCONTINUED] ondansetron (ZOFRAN) injection 4 mg, 4 mg, IntraVENous, Q6H PRN, Mouna Bob MD    famotidine (PEPCID) tablet 20 mg, 20 mg, Oral, BID, Mouna Bob MD, 20 mg at 06/03/22 2248    acidophilus-pectin, citrus tablet 2 Tablet, 2 Tablet, Oral, DAILY, Mouna Bob MD, 2 Tablet at 06/03/22 1021    albuterol-ipratropium (DUO-NEB) 2.5 MG-0.5 MG/3 ML, 3 mL, Nebulization, Q6H PRN, Mouna Bob MD    artificial saliva (MOUTH KOTE) 1 Spray, 1 Spray, Oral, TID, Mouna Bob MD, 1 [place] at 06/03/22 2256    brimonidine (ALPHAGAN) 0.2 % ophthalmic solution 1 Drop, 1 Drop, Both Eyes, TID, Mouna Bob MD, 1 Drop at 06/03/22 2257    cholestyramine-aspartame (QUESTRAN LIGHT) packet 4 g, 4 g, Oral, BID WITH MEALS, Jun, 201 SYLVIA Dunn Pricilla Garay MD, 4 g at 06/03/22 1717    [Held by provider] fenofibrate nanocrystallized (TRICOR) tablet 48 mg, 48 mg, Oral, DAILY, Alicia Bob MD, 48 mg at 05/19/22 7050    gabapentin (NEURONTIN) capsule 300 mg, 300 mg, Oral, BID, Alicia Bob MD, 300 mg at 06/03/22 2248    insulin glargine (LANTUS) injection 50 Units, 50 Units, SubCUTAneous, DAILY, Alicia Bob MD, 50 Units at 06/02/22 0951    latanoprost (XALATAN) 0.005 % ophthalmic solution 1 Drop, 1 Drop, Right Eye, QPM, Alicia Bob MD, 1 Drop at 06/03/22 1829    traMADoL (ULTRAM) tablet 25 mg, 25 mg, Oral, Q12H PRN, Alicia Bob MD, 25 mg at 06/03/22 1717

## 2022-06-04 NOTE — PROGRESS NOTES
Comprehensive Nutrition Assessment    Type and Reason for Visit: Reassess (interim)    Nutrition Recommendations/Plan:   1. Continue NPO  2. Continue TF via PEG to Glucerna 1.5 at goal of 60mL/hr, continuous        Flush with 75 mL H2O Q4H        Provide 2 pkt Doug Daily (180kcals, 5g pro)        Provides 2340 kcal (100%), 124g PRO (100%), 1544 mL H2O (97%)      4. Please document TF rate, tolerance, BM in I/Os. Malnutrition Assessment:  Malnutrition Status:  Mild malnutrition (06/04/22 1550)    Context:  Chronic illness     Findings of the 6 clinical characteristics of malnutrition:   Energy Intake:  No significant decrease in energy intake  Weight Loss:  Greater than 5% over 1 month (16# x1 month per EMR (8.3%, severe))     Body Fat Loss:  No significant body fat loss,     Muscle Mass Loss:  No significant muscle mass loss (not nutrition related), Clavicles (pectoralis &deltoids),Thigh (quadriceps)  Fluid Accumulation:  No significant fluid accumulation,        Nutrition Assessment:    Admitted for sacral PI, (4/21) I/D. (5/19) Intubated. (5/23) Repeat I&D, L TMA--Wound vacs placed. Plan for diverting colostomy however family now declined d/t high leah-operative risk. (6/3) extubated after >2wks on vent. (5/15) TF initiated, intermittently held for procedures, pressors, adjusted during intubation. Adjusted back to Glucerna at last assessment, current regimen appropriate s/p extube. Will start wound healing supplement. Labs: H/H 9.4/29.8, BUN 22, BG , Alk Phos 139, Alb 1.4. Meds: Probiotic, ampicillin, Questran Light, pepcid, FeSu, fluconazole, lasix, robinul,  Insulin,tramadol, vancomycin. Nutrition Related Findings:    (6/3) NFPE finding no nutrition-related deficits, however difficult to tell through edema and adiposity. PEG in place, infusing, GRVs <100ml. Generalized and x4 extremity non-pitting edema. Last BM 6/3, FMS removed. Noted hx chronic diarrhea.  Wound Type: Surgical incision,Multiple,Wound vac (Surgical- L foot s/p TMA with wound vac; Stage 4 sacrum s/p I/D with wound vac)    Current Nutrition Intake & Therapies:  Average Meal Intake: NPO  Average Supplement Intake: NPO  DIET NPO  ADULT ORAL NUTRITION SUPPLEMENT Lunch; Wound Healing Supplement  ADULT TUBE FEEDING PEG; Diabetic; Delivery Method: Continuous; Continuous Initial Rate (mL/hr): 60; Continuous Advance Tube Feeding: No; Water Flush Volume (mL): 75; Water Flush Frequency: Q 4 hours; Modulars/Additives: Wound Healing; Specify How . .. Anthropometric Measures:  Height: 5' 6.93\" (170 cm)  Ideal Body Weight (IBW): 135 lbs (61 kg)  Admission Body Weight: 175 lb  Current Body Wt:  84.5 kg (186 lb 4.6 oz) (5/31), 138 % IBW. Bed scale  Current BMI (kg/m2): 29.2        Weight Adjustment:  (EDW 80kg; BMI 27.6)                 BMI Category: Overweight (BMI 25.0-29. 9)  Wt fluctuations since admit, overall wt gain ~5kg, ?  Fluid vs true wt gain    Estimated Daily Nutrient Needs:  Energy Requirements Based On: Kcal/kg (Stable vent x2 wk)  Weight Used for Energy Requirements:  (EDW)  Energy (kcal/day): 2000-2400kcals (25-30kcals/kg EDW bedbound vs wounds)  Weight Used for Protein Requirements: Other (specify) (EDW)  Protein (g/day): 120-136 (1.5-1.7g/kg)  Method Used for Fluid Requirements: ml/kg  Fluid (ml/day): 1600-2000ml (20-25ml/kg)    Nutrition Diagnosis:   · Inadequate oral intake related to cognitive or neurological impairment,biting/chewing (masticatory) difficulty,swallowing difficulty (CVA) as evidenced by nutrition support-enteral nutrition      Nutrition Interventions:   Food and/or Nutrient Delivery: Continue NPO,Start oral nutrition supplement,Continue tube feeding  Nutrition Education/Counseling: No recommendations at this time  Coordination of Nutrition Care: Continue to monitor while inpatient  Plan of Care discussed with: RN    Goals:  Previous Goal Met: Progressing toward goal(s)  Goals: Meet at least 75% of estimated needs,Tolerate nutrition support at goal rate,within 7 days  Specify Other Goals: Maintain BGs <180 mg/dL.  Maintain CBW within +/- 0.5 kg    Nutrition Monitoring and Evaluation:   Behavioral-Environmental Outcomes: None identified  Food/Nutrient Intake Outcomes: Enteral nutrition intake/tolerance,Supplement intake  Physical Signs/Symptoms Outcomes: Biochemical data,Diarrhea,Weight    Discharge Planning:    Enteral nutrition    Brenda Baez  Contact: Ext 1928, or via [email or web address]

## 2022-06-04 NOTE — PROGRESS NOTES
Vancomycin Dosing Consult  Day #21 of vancomycin therapy  Consult ordered by Dr. Filomena Navarrete for this 78 y.o. female for indication of sacral wound infection, sepsis. Antibiotic regimen: Vancomycin + Unasyn  + fluconazole    Temp (24hrs), Av.6 °F (37 °C), Min:98 °F (36.7 °C), Max:99.2 °F (37.3 °C)    Recent Labs     22  0400 22  0454 22  0321   WBC 17.7* 12.0* 13.5*   CREA 0.87 0.70 0.79   BUN 22* 22* 24*     Est CrCl: ~60-65 mL/min; UO: 0.7 mL/kg/hr  Concomitant nephrotoxic drugs: Off vasopressors    Cultures:    Wound: Moderate MDR Pseudomonas aeruginosa susceptible to Zerbaxa, aminoglycosides), moderate mixed skin yasmine, moderate mixed enteric yasmine including yeast, final   Blood: CoNS in the anaerobic bottle, final   Wound, ulcer:  Moderate Acinetobacter baumannii, moderate Enterococcus faecium, light >2 organisms (contraindicated), final  5/15 Wound, great toe: Heavy Acinetobacter baumannii complex (Zosyn resistant, susceptible to Unasyn), heavy Diphtheroids, final   Tissue: Heavy mixed skin yasmine, final    MRSA Swab: Not ordered, patient already received first dose of vancomycin    Target range: Trough 10-15 mcg/mL    Recent level history:  Date/Time Dose & Interval Measured Level (mcg/mL)    @ 0445 750 mg 19.8    @ 0321 Held 15.4    @ 0400 750 mg (given ) 13.6        Assessment/Plan:   Afebrile, continued leukocytosis, CRP and procal remain elevated  CKD, SCr trending up, clearance of vancomycin remains quite slow relative to renal function  Level within therapeutic range, give 500 mg x 1 and check a random level tomorrow AM  Antimicrobial stop date TBD

## 2022-06-04 NOTE — PROGRESS NOTES
IMPRESSION:   1. Acute hypoxic respiratory failure remains on the ventilator  2. Sacral decubiti ulcer with Acinetobacter and Enterococcus  3. Severe sepsis with septic shock resolved on no pressors  4. Left foot wound  5. Transaminitis continue to improve  6. Shock liver resolved  7. Hypokalemia repleted  8. Symptomatic anemia stable after transfusion  9. Cardiomyopathy ejection fraction 50 to 55%  10. Mild pulmonary edema      RECOMMENDATIONS/PLAN:   1. ICU monitoring  1. Extubated on 6/3 now on nasal cannula tolerating well  2. Hemoglobin trending down we will continue to monitor  3. Patient underwent on 5/23 left metatarsal amputation and sacral wound debridement  4. Severe sepsis with decubiti ulcer   5. Patient is on Unasyn  6. Chest x-ray shows mild congestive changes with fluid in the minor fissure  7. Anemia hemoglobin improved after transfusion 5/20     [x] High complexity decision making was performed  [x] See my orders for details  HPI   73-year-old lady came in because of leg pain past medical history of hypertension diabetes mellitus peripheral vascular disease CVA she is bedbound she has leg wound and also has sacral decubiti ulcer and she was scheduled for laparoscopic colostomy and sacral wound debridement and amputation of the left tarsometatarsal because of wound infection but her condition got worse her liver enzyme was elevated INR was also elevated she was intubated transferred to ICU was getting vancomycin and Unasyn started on Levophed because of low blood pressure. PMH:  has a past medical history of Anemia, Chronic kidney disease, Diabetes mellitus ([address] Hemiplegia affecting dominant side, post-stroke ([address] (05/04/2022), HTN (hypertension), Hyperkalemia, Hyperlipidemia, Ill-defined condition, Neuropathy, PAD (peripheral artery disease) ([address] Sciatica, Stroke ([address] and UTI (urinary tract infection).     PSH:   has a past surgical history that includes hx other surgical (Bilateral); hx amputation (Right); hx other surgical; hx cervical fusion; hx vascular stent (Bilateral); hx vascular stent (Bilateral); hx gi; and ir insert non tunl cvc over 5 yrs (5/27/2022). FHX: family history includes Cancer in her mother; Diabetes in her mother; Hypertension in her mother. SHX:  reports that she has never smoked. She has never used smokeless tobacco. She reports previous alcohol use. She reports that she does not use drugs.     ALL: No Known Allergies     MEDS:   [x] Reviewed - As Below   [] Not reviewed    Current Facility-Administered Medications   Medication    ferrous sulfate 300 mg (60 mg iron)/5 mL oral syrup 300 mg    metoprolol tartrate (LOPRESSOR) tablet 12.5 mg    alteplase (CATHFLO) injection 2 mg    glycopyrrolate (ROBINUL) injection 0.1 mg    0.9% sodium chloride infusion 250 mL    0.9% sodium chloride infusion 250 mL    fluconazole (DIFLUCAN) 200mg/100 mL IVPB (premix)    aspirin chewable tablet 81 mg    NOREPINephrine (LEVOPHED) 8 mg in 0.9% NS 250ml infusion    sodium chloride (NS) flush 5-40 mL    sodium chloride (NS) flush 5-40 mL    0.9% sodium chloride infusion 250 mL    furosemide (LASIX) injection 20 mg    ondansetron (ZOFRAN) injection 4 mg    enoxaparin (LOVENOX) injection 40 mg    dextrose 5% infusion    insulin lispro (HUMALOG) injection    glucose chewable tablet 16 g    glucagon (GLUCAGEN) injection 1 mg    dextrose 10% infusion 0-250 mL    hydrALAZINE (APRESOLINE) tablet 12.5 mg    sacubitriL-valsartan (ENTRESTO) 24-26 mg tablet 1 Tablet    ampicillin-sulbactam (UNASYN) 3 g in 0.9% sodium chloride (MBP/ADV) 100 mL MBP    dextrose 10% infusion 0-250 mL    insulin glargine (LANTUS) injection 10 Units    propofol (DIPRIVAN) 10 mg/mL infusion    0.9% sodium chloride infusion 250 mL    Vancomycin Pharmacy Dosing    acetaminophen (TYLENOL) tablet 650 mg    Or    acetaminophen (TYLENOL) suppository 650 mg    polyethylene glycol (MIRALAX) packet 17 g  ondansetron (ZOFRAN ODT) tablet 4 mg    famotidine (PEPCID) tablet 20 mg    acidophilus-pectin, citrus tablet 2 Tablet    albuterol-ipratropium (DUO-NEB) 2.5 MG-0.5 MG/3 ML    artificial saliva (MOUTH KOTE) 1 Spray    brimonidine (ALPHAGAN) 0.2 % ophthalmic solution 1 Drop    cholestyramine-aspartame (QUESTRAN LIGHT) packet 4 g    [Held by provider] fenofibrate nanocrystallized (TRICOR) tablet 48 mg    gabapentin (NEURONTIN) capsule 300 mg    insulin glargine (LANTUS) injection 50 Units    latanoprost (XALATAN) 0.005 % ophthalmic solution 1 Drop    traMADoL (ULTRAM) tablet 25 mg      MAR reviewed and pertinent medications noted or modified as needed   Current Facility-Administered Medications   Medication    ferrous sulfate 300 mg (60 mg iron)/5 mL oral syrup 300 mg    metoprolol tartrate (LOPRESSOR) tablet 12.5 mg    alteplase (CATHFLO) injection 2 mg    glycopyrrolate (ROBINUL) injection 0.1 mg    0.9% sodium chloride infusion 250 mL    0.9% sodium chloride infusion 250 mL    fluconazole (DIFLUCAN) 200mg/100 mL IVPB (premix)    aspirin chewable tablet 81 mg    NOREPINephrine (LEVOPHED) 8 mg in 0.9% NS 250ml infusion    sodium chloride (NS) flush 5-40 mL    sodium chloride (NS) flush 5-40 mL    0.9% sodium chloride infusion 250 mL    furosemide (LASIX) injection 20 mg    ondansetron (ZOFRAN) injection 4 mg    enoxaparin (LOVENOX) injection 40 mg    dextrose 5% infusion    insulin lispro (HUMALOG) injection    glucose chewable tablet 16 g    glucagon (GLUCAGEN) injection 1 mg    dextrose 10% infusion 0-250 mL    hydrALAZINE (APRESOLINE) tablet 12.5 mg    sacubitriL-valsartan (ENTRESTO) 24-26 mg tablet 1 Tablet    ampicillin-sulbactam (UNASYN) 3 g in 0.9% sodium chloride (MBP/ADV) 100 mL MBP    dextrose 10% infusion 0-250 mL    insulin glargine (LANTUS) injection 10 Units    propofol (DIPRIVAN) 10 mg/mL infusion    0.9% sodium chloride infusion 250 mL    Vancomycin Pharmacy Dosing    acetaminophen (TYLENOL) tablet 650 mg    Or    acetaminophen (TYLENOL) suppository 650 mg    polyethylene glycol (MIRALAX) packet 17 g    ondansetron (ZOFRAN ODT) tablet 4 mg    famotidine (PEPCID) tablet 20 mg    acidophilus-pectin, citrus tablet 2 Tablet    albuterol-ipratropium (DUO-NEB) 2.5 MG-0.5 MG/3 ML    artificial saliva (MOUTH KOTE) 1 Spray    brimonidine (ALPHAGAN) 0.2 % ophthalmic solution 1 Drop    cholestyramine-aspartame (QUESTRAN LIGHT) packet 4 g    [Held by provider] fenofibrate nanocrystallized (TRICOR) tablet 48 mg    gabapentin (NEURONTIN) capsule 300 mg    insulin glargine (LANTUS) injection 50 Units    latanoprost (XALATAN) 0.005 % ophthalmic solution 1 Drop    traMADoL (ULTRAM) tablet 25 mg      PMH:  has a past medical history of Anemia, Chronic kidney disease, Diabetes mellitus ([facility] [address] Hemiplegia affecting dominant side, post-stroke ([facility] [address] (05/04/2022), HTN (hypertension), Hyperkalemia, Hyperlipidemia, Ill-defined condition, Neuropathy, PAD (peripheral artery disease) ([facility] [address] Sciatica, Stroke ([address] and UTI (urinary tract infection). PSH:   has a past surgical history that includes hx other surgical (Bilateral); hx amputation (Right); hx other surgical; hx cervical fusion; hx vascular stent (Bilateral); hx vascular stent (Bilateral); hx gi; and ir insert non tunl cvc over 5 yrs (5/27/2022). FHX: family history includes Cancer in her mother; Diabetes in her mother; Hypertension in her mother. SHX:  reports that she has never smoked. She has never used smokeless tobacco. She reports previous alcohol use. She reports that she does not use drugs. ROS:  Unable to obtain intubated on ventilator    Hemodynamics:    CO:    CI:    CVP:    SVR:   PAP Systolic:    PAP Diastolic:    PVR:    DP19:        Ventilator Settings:      Mode Rate TV Press PEEP FiO2 PIP Min.  Vent   Pressure support,Spontaneous    500 ml 15 cm H2O  5 cm H20 21 %  27 cm H2O  7.45 l/min        Vital Signs: Telemetry:    normal sinus rhythm Intake/Output:   Visit Vitals  /69 (BP 1 Location: Right upper arm, BP Patient Position: At rest)   Pulse (!) 104   Temp 98.5 °F (36.9 °C)   Resp (!) 104   Ht 5' 6.93\" (1.7 m)   Wt 87.8 kg (193 lb 9 oz)   SpO2 96%   Breastfeeding No   BMI 30.38 kg/m²       Temp (24hrs), Av.4 °F (36.9 °C), Min:98 °F (36.7 °C), Max:98.7 °F (37.1 °C)        O2 Device: Nasal cannula O2 Flow Rate (L/min): 2 l/min       Wt Readings from Last 4 Encounters:   22 87.8 kg (193 lb 9 oz)   04/10/22 86.6 kg (191 lb)   22 82.2 kg (181 lb 3.5 oz)   20 84.4 kg (186 lb)          Intake/Output Summary (Last 24 hours) at 2022 0844  Last data filed at 2022 0742  Gross per 24 hour   Intake 1060 ml   Output 1550 ml   Net -490 ml       Last shift:       07 -  1900  In: 75   Out: -   Last 3 shifts: 1901 -  0700  In: 6924   Out: 2450 [Urine:2350; Drains:100]       Physical Exam:     General: intubated on vent unresponsive on propofol  HEENT: NCAT,  Eyes: anicteric; conjunctiva clear doll's eye reflex present  Neck: no nodes, no cuff leak, trach midline; no accessory MM use. Neck veins obscured by obesity but seems slightly engorged  Chest: no deformity,   Cardiac: R regular; no murmur;   Lungs: distant breath sounds; no wheezes rales in the bases  Abd: soft, NT, hypoactive BS  Ext: Ace edema; no joint swelling;  No clubbing  :clear urine  Neuro: Positive on propofol doll's eye reflex present flaccid extremities  Psych-  unable to assess  Skin: warm, dry, no cyanosis;   Pulses: Brachial and radial pulses intact  Capillary: Normal capillary refill      DATA:    MAR reviewed and pertinent medications noted or modified as needed  MEDS:   Current Facility-Administered Medications   Medication    ferrous sulfate 300 mg (60 mg iron)/5 mL oral syrup 300 mg    metoprolol tartrate (LOPRESSOR) tablet 12.5 mg    alteplase (CATHFLO) injection 2 mg    glycopyrrolate (ROBINUL) injection 0.1 mg    0.9% sodium chloride infusion 250 mL    0.9% sodium chloride infusion 250 mL    fluconazole (DIFLUCAN) 200mg/100 mL IVPB (premix)    aspirin chewable tablet 81 mg    NOREPINephrine (LEVOPHED) 8 mg in 0.9% NS 250ml infusion    sodium chloride (NS) flush 5-40 mL    sodium chloride (NS) flush 5-40 mL    0.9% sodium chloride infusion 250 mL    furosemide (LASIX) injection 20 mg    ondansetron (ZOFRAN) injection 4 mg    enoxaparin (LOVENOX) injection 40 mg    dextrose 5% infusion    insulin lispro (HUMALOG) injection    glucose chewable tablet 16 g    glucagon (GLUCAGEN) injection 1 mg    dextrose 10% infusion 0-250 mL    hydrALAZINE (APRESOLINE) tablet 12.5 mg    sacubitriL-valsartan (ENTRESTO) 24-26 mg tablet 1 Tablet    ampicillin-sulbactam (UNASYN) 3 g in 0.9% sodium chloride (MBP/ADV) 100 mL MBP    dextrose 10% infusion 0-250 mL    insulin glargine (LANTUS) injection 10 Units    propofol (DIPRIVAN) 10 mg/mL infusion    0.9% sodium chloride infusion 250 mL    Vancomycin Pharmacy Dosing    acetaminophen (TYLENOL) tablet 650 mg    Or    acetaminophen (TYLENOL) suppository 650 mg    polyethylene glycol (MIRALAX) packet 17 g    ondansetron (ZOFRAN ODT) tablet 4 mg    famotidine (PEPCID) tablet 20 mg    acidophilus-pectin, citrus tablet 2 Tablet    albuterol-ipratropium (DUO-NEB) 2.5 MG-0.5 MG/3 ML    artificial saliva (MOUTH KOTE) 1 Spray    brimonidine (ALPHAGAN) 0.2 % ophthalmic solution 1 Drop    cholestyramine-aspartame (QUESTRAN LIGHT) packet 4 g    [Held by provider] fenofibrate nanocrystallized (TRICOR) tablet 48 mg    gabapentin (NEURONTIN) capsule 300 mg    insulin glargine (LANTUS) injection 50 Units    latanoprost (XALATAN) 0.005 % ophthalmic solution 1 Drop    traMADoL (ULTRAM) tablet 25 mg        Labs:    Recent Labs     06/04/22  0400 06/03/22  0454 06/02/22  0321   WBC 17.7* 12.0* 13.5*   HGB 10.1* 9.4* 10.2*    323 357     Recent Labs     22  0400 22  0454 22  0321    145 141   K 4.0 3.3* 3.5   * 116* 113*   CO2 21 22 20*   * 86 174*   BUN 22* 22* 24*   CREA 0.87 0.70 0.79   CA 9.0 8.9 9.0   MG  --  1.8  --    PHOS  --  2.8  --    ALB 1.5* 1.4* 1.5*   ALT 48 51 61     No results for input(s): PH, PCO2, PO2, HCO3, FIO2 in the last 72 hours.  AC 12  PEEP 5 FiO2 30%   echo ejection fraction 20% mild mitral regurg left atrium 3.5 cm RVSP 35  , 1313, 1361    Lab Results   Component Value Date/Time    Culture result: Heavy  Mixed skin yasmine isolated   2022 06:15 PM    Culture result: Rare Normal respiratory yasmine 2022 02:48 PM    Culture result: Heavy Acinetobacter baumannii complex 05/15/2022 07:15 PM    Culture result: Heavy Diphtheroids 05/15/2022 07:15 PM     Lab Results   Component Value Date/Time    TSH 2.880 2016 10:13 AM        Imagin/22 chest x-ray with ET tube in place hazy accentuated basilar markings with small amount of fluid in the minor fissure  Results from Hospital Encounter encounter on 22    XR CHEST PORT    Narrative  XR CHEST PORT    Comparison:  2022    Single view:  Examination limited by the patient's body habitus, AP portable  technique, and patient rotation to the right. Endotracheal tube tip 4.6 cm above  the jennifer. Right central line tip projects over the lower superior vena cava. The cardiac silhouette remains large. Stable hemodynamic status. No pneumothorax  or large pleural effusion apparent. Impression  No significant change. Results from [place] encounter on 22    CT ABD PELV WO CONT    Narrative  CT ABD PELV WO CONT    Technique: Noncontrasted CT scan of the abdomen and pelvis was performed  utilizing transaxial images obtained from the dome of the diaphragm to the pubic  symphysis. Coronal and sagittal reconstructions were generated.     Dose Reduction:  All CT scans at this facility are performed using dose reduction optimization  techniques as appropriate to a performed exam including the following: Automated  exposure control, adjustments of the mA and/or kV according to patient size, or  use of iterative reconstruction technique. Comparison:  2/25/2022. Findings:  Small-moderate pleural effusions are present with bibasilar  atelectasis. Atherosclerotic calcifications are again evident including coronary  artery calcifications. Gastrostomy tube tip in the gastric antrum. The liver, spleen, pancreas, and  adrenal glands are unremarkable for noncontrasted technique. Stable lobulated  kidneys. Negative for hydronephrosis. Gallbladder is unremarkable. No biliary  duct dilatation apparent. The abdominal aorta is normal in caliber. There is no evidence of bowel obstruction. Small volume ascites has developed. The urinary bladder is decompressed by a Bell catheter. The uterus is  unremarkable. The appendix is normal.    There is mild body wall edema. No suspicious lytic or blastic lesion evident. Impression  Third spacing of fluids with small volume ascites, small-moderate  pleural effusions, and mild body wall edema. Bibasilar atelectasis. 5/20 intubated on ventilator we will continue current vent settings patient is supposed to go for surgery because of transaminitis elevated liver enzyme we will wait as per surgeon for sacral wound debridement and diverting loop colostomy off Levophed, improvement in AST and ALT  5/21 continue with the current vent settings ABG acceptable liver enzyme improving awaiting for the surgery  5/22 maintain ventilator as is in anticipation of surgery.   Renal function improved mild congestion on chest x-ray we will give 1 dose Lasix and potassium  5/23 patient is going for surgery today we will leave ventilator as it is INR is 1.5  5/25 patient remained on ventilator intubated doing well, patient received vitamin K fresh frozen plasma schedule for laparoscopic loop colostomy possible today  5/26 no surgery has been plans will start weaning do sedation vacation  5/27 try to do sedation vacation to wean patient blood pressure dropped still on Levophed we will continue to wean discussed with the family at bedside  5/28 remains on ventilator sedated and also on Levophed possible surgery next week  5/29 on ventilator ET tube advisement 1 to 1.5 cm we will repeat chest x-ray ABG acceptable  5/30 continue with current vent setting  5/31 awaiting for possible surgery if not we will start weaning  6/1 remain intubated on ventilator surgery has been delayed we will start weaning and plan for extubation  6/2 no surgery was planned so we will try to wean she is off Levophed we will do sedation vacation and if weaning cardio acceptable will extubate  6/3 change vent settings to spontaneous if weaning creatinine acceptable will extubate  6/4 extubated on 6/3 now on nasal cannula tolerating well   Time of care 30 minutes

## 2022-06-04 NOTE — PROGRESS NOTES
Subjective:  Extubated, on O2 per protocol and tolerating well. Surgery was cancelled as overall prognosis is poor. INPATIENT MEDICATIONS:  Home medications reviewed.     Current Facility-Administered Medications:     ferrous sulfate 300 mg (60 mg iron)/5 mL oral syrup 300 mg, 300 mg, Per NG tube, BID WITH MEALS, Israel Soto MD, 300 mg at 06/03/22 1717    metoprolol tartrate (LOPRESSOR) tablet 12.5 mg, 12.5 mg, Per NG tube, BID, Israel Soto MD, 12.5 mg at 06/03/22 2249    alteplase (CATHFLO) injection 2 mg, 2 mg, InterCATHeter, PRN, Filemon Johnson MD, 2 mg at 06/03/22 1708    glycopyrrolate (ROBINUL) injection 0.1 mg, 0.1 mg, IntraVENous, TID, Martínez Crespo MD, 0.1 mg at 06/03/22 2253    0.9% sodium chloride infusion 250 mL, 250 mL, IntraVENous, PRN, Stef Bob MD    0.9% sodium chloride infusion 250 mL, 250 mL, IntraVENous, PRN, Bryan Soto MD    fluconazole (DIFLUCAN) 200mg/100 mL IVPB (premix), 200 mg, IntraVENous, Q24H, Estee Miller MD, Last Rate: 100 mL/hr at 06/03/22 1024, 200 mg at 06/03/22 1024    aspirin chewable tablet 81 mg, 81 mg, Per G Tube, DAILY, Israel Soto MD, 81 mg at 06/03/22 1022    NOREPINephrine (LEVOPHED) 8 mg in 0.9% NS 250ml infusion, 0.5-16 mcg/min, IntraVENous, TITRATE, Israel Soto MD, Paused at 05/29/22 2046    sodium chloride (NS) flush 5-40 mL, 5-40 mL, IntraVENous, Q8H, Rah Matthew MD, 10 mL at 06/04/22 0634    sodium chloride (NS) flush 5-40 mL, 5-40 mL, IntraVENous, PRN, Dick Sam MD, 10 mL at 05/31/22 2213    0.9% sodium chloride infusion 250 mL, 250 mL, IntraVENous, PRN, Stef Bob MD    furosemide (LASIX) injection 20 mg, 20 mg, IntraVENous, Q12H, Stef Bob MD, 20 mg at 06/03/22 2221    [DISCONTINUED] ondansetron (ZOFRAN ODT) tablet 4 mg, 4 mg, Oral, Q8H PRN **OR** ondansetron (ZOFRAN) injection 4 mg, 4 mg, IntraVENous, Q6H PRN, Stef Bob MD    enoxaparin (LOVENOX) injection 40 mg, 40 mg, SubCUTAneous, DAILY, Marlena Bob MD, 40 mg at 06/03/22 1024    dextrose 5% infusion, 30 mL/hr, IntraVENous, CONTINUOUS, Kalpesh Lara MD, Last Rate: 30 mL/hr at 06/03/22 0058, 30 mL/hr at 06/03/22 0058    insulin lispro (HUMALOG) injection, , SubCUTAneous, Q6H, Marlena Bob MD, 1 Units at 06/04/22 0631    glucose chewable tablet 16 g, 4 Tablet, Oral, PRN, Marlena Bob MD    glucagon [facility]) injection 1 mg, 1 mg, IntraMUSCular, PRN, Marlena Bob MD    dextrose 10% infusion 0-250 mL, 0-250 mL, IntraVENous, PRN, Marlena Bob MD, 250 mL at 06/01/22 1217    hydrALAZINE (APRESOLINE) tablet 12.5 mg, 12.5 mg, Oral, TID, Marlena Bob MD, 12.5 mg at 06/03/22 2248    sacubitriL-valsartan (ENTRESTO) 24-26 mg tablet 1 Tablet, 1 Tablet, Oral, Q12H, Marlena Bob MD, 1 Tablet at 06/03/22 1450    ampicillin-sulbactam (UNASYN) 3 g in 0.9% sodium chloride (MBP/ADV) 100 mL MBP, 3 g, IntraVENous, Q8H, Marlena Bob MD, Last Rate: 200 mL/hr at 06/04/22 0631, 3 g at 06/04/22 0631    dextrose 10% infusion 0-250 mL, 0-250 mL, IntraVENous, PRN, Marlena Bob MD, 125 mL at 05/19/22 0537    insulin glargine (LANTUS) injection 10 Units, 10 Units, SubCUTAneous, QHS, Marlena Bob MD, 10 Units at 06/03/22 2253    propofol (DIPRIVAN) 10 mg/mL infusion, 0-50 mcg/kg/min, IntraVENous, TITRATE, Marlena Bob MD, Last Rate: 9.5 mL/hr at 06/03/22 0051, 20 mcg/kg/min at 06/03/22 0051    0.9% sodium chloride infusion 250 mL, 250 mL, IntraVENous, PRN, Marlena Bob MD, Last Rate: 15 mL/hr at 05/22/22 0820, Rate Verify at 05/22/22 0820    Vancomycin Pharmacy Dosing, , Other, Rx Dosing/Monitoring, Marlena Bob MD    acetaminophen (TYLENOL) tablet 650 mg, 650 mg, Oral, Q6H PRN, 650 mg at 05/22/22 0556 **OR** acetaminophen (TYLENOL) suppository 650 mg, 650 mg, Rectal, Q6H PRN, Marlena Bob MD, 650 mg at 05/16/22 2223    polyethylene glycol (MIRALAX) packet 17 g, 17 g, Oral, DAILY PRN, Eric Holguin MD    ondansetron [facility] ODT) tablet 4 mg, 4 mg, Oral, Q8H PRN **OR** [DISCONTINUED] ondansetron (ZOFRAN) injection 4 mg, 4 mg, IntraVENous, Q6H PRN, Sabrina Bob MD    famotidine (PEPCID) tablet 20 mg, 20 mg, Oral, BID, Sabrina Bob MD, 20 mg at 06/03/22 2248    acidophilus-pectin, citrus tablet 2 Tablet, 2 Tablet, Oral, DAILY, Sabrina Bob MD, 2 Tablet at 06/03/22 1021    albuterol-ipratropium (DUO-NEB) 2.5 MG-0.5 MG/3 ML, 3 mL, Nebulization, Q6H PRN, Sabrina Bob MD    artificial saliva (MOUTH KOTE) 1 Spray, 1 Spray, Oral, TID, Sabrina Bob MD, 1 [place] at 06/03/22 2256    brimonidine (ALPHAGAN) 0.2 % ophthalmic solution 1 Drop, 1 Drop, Both Eyes, TID, Sabrina Bob MD, 1 Drop at 06/03/22 2257    cholestyramine-aspartame (QUESTRAN LIGHT) packet 4 g, 4 g, Oral, BID WITH MEALS, Sabrina Bob MD, 4 g at 06/03/22 1717    [Held by provider] fenofibrate nanocrystallized (TRICOR) tablet 48 mg, 48 mg, Oral, DAILY, Sabrina Bob MD, 48 mg at 05/19/22 6781    gabapentin (NEURONTIN) capsule 300 mg, 300 mg, Oral, BID, Sabrina Bob MD, 300 mg at 06/03/22 2248    insulin glargine (LANTUS) injection 50 Units, 50 Units, SubCUTAneous, DAILY, Sabrina Bob MD, 50 Units at 06/02/22 0951    latanoprost (XALATAN) 0.005 % ophthalmic solution 1 Drop, 1 Drop, Right Eye, QPM, Sabrina Bob MD, 1 Drop at 06/03/22 1829    traMADoL (ULTRAM) tablet 25 mg, 25 mg, Oral, Q12H PRN, Paulino, Sabrina Mayfield MD, 25 mg at 06/03/22 1717       REVIEW OF SYSTEMS:  Intubated. Physical Exam:   General: Chronically ill appearing female lying in bed intubated/sedated, NAD  HEENT: Normocephalic, PERRL, no drainage  Neck: Supple, Trachea midline, No JVD  RESP: Coarse globally with diminished lower lobes. + Symmetrical chest movement. 30% FiO2. Intubated/Ventilated. Cardiovascular: RRR no RG. + murmur.   PVS: No rubor, cyanosis, mild pitting BLE edema, Radial, DP, PT pulses equal bilaterally  ABD: obese, soft, NT, Normoactive BS  Derm: +arterial line,+sacral wound vac  :+giraldo catheter  Neuro: sedation off  PSYCH: No anxiety or agitation    Visit Vitals  /69 (BP 1 Location: Right upper arm, BP Patient Position: At rest)   Pulse (!) 104   Temp 99.2 °F (37.3 °C)   Resp (!) 104   Ht 5' 6.93\" (1.7 m)   Wt 87.8 kg (193 lb 9 oz)   SpO2 96%   Breastfeeding No   BMI 30.38 kg/m²     Case was discussed with Dr. Brandon Tidwell and our impression and recommendations are as follows:     1. New onset cardiomyopathy:    - Echo 5/18/22 showing EF of 20-25% down from 50-55% in 4/2022.    - Resume HF medications, patient no longer on pressor support  -  Recommend ischemic evaluation once patient is clinically stable. Deferred at this time due to patient's acuity. - The patient is considered high risk for upcoming procedure. - Discussed the potential cardiac risks involved with surgical interventions with the patient's family and they have requested to proceed with the surgery.      2. Elevated troponin:    - HS troponin peaked at 890, currently 275. -  Hgb 9.4  - Continue BB. -  Ischemic evaluation pending post- op clinical course.    - No statin given LFTs elevated likely due to shock.       3.   NSVT:   - No reoccurrence.    - Continue beta blocker.   - Maintain lytes.        Will continue to follow peripherally at this time. Thank you for involving us in the care of this patient. Please do not hesitate to call with questions or concerns. Pretty Gallardo NP  6/4/2022     [facility]  [address] [facility].    [address] [facility], [address] [place] [address]  [phone]

## 2022-06-05 NOTE — PROGRESS NOTES
General Daily Progress Note          Patient Name:   Rambo Jaime       YOB: 1945       Age:  78 y.o. 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 Cipro    Admitted again yesterday concerning for worsening of sacral ulcer    During last admission patient was seen by infectious disease and also seen by the vascular surgeon patient had debridement of wound during the last admission    Patient seen by the infectious disease yesterday    Sacral wound infection recent cultures growing Pseudomonas resistant to Zosyn and meropenem    5/17    Patient alert awake not in distress  Patient was seen by the vascular surgeon    Vascular surgery planned for laparoscopic loop colostomy placement and sacral debridement then wound vacuum  Also try to close the wound on the left foot with TMA    5/18    Resting in the bed not in any distress  Blood sugars running high    Seen by infectious disease continue vancomycin and start on Unasyn    5/19    And went to the OR intubated because of elevated LFT and elevated INR  Surgery was canceled    On ventilator 40% O2  Propofol drip    5/20    Patient on ventilator with 30% O2  On propofol drip    Hemoglobin dropped to 6.8    Patient's daughter at the bedside    5/20    Patient on ventilator 30% O2  On propofol drip  Getting PEG tube feeding    5/21    Patient still on ventilator 30% O2  On propofol drip  Liver Function improving    5/22  On ventilator with 30% O2 on propofol drip    5/24  Awaiting tissue culture results.    On ventilator with 30% O2 on propofol drip  Left transmetatarsal amputation and Sacral wound excisional wound debridement 13 x 15 cm to the bone completed yesterday. CXR: Hazy mid and lower lung reticular markings.  Dependent small to moderate volume,  right greater than left pleural effusions  Remarkable labs   WBC: 15.2   Hgb: 8.5   Na; 148  CRP: 13.60   Procal: 0.71      5/25    Patient on ventilator with 30% O2  Seen by the vascular surgeon and plan for surgery today    5/26  Patient on ventilator with 30% O2  Hypotensive on Levophed  On propofol drip    Surgery was canceled yesterday because patient unstable hemodynamically not cleared by the anesthesia    5/27    Patient on ventilator with 30% O2  Hypotensive on Levophed  Getting PEG tube feeding    5/30     Patient on ventilator with 21% O2  On propofol drip  Off pressor    5/31    Patient on ventilator with 21% O2  On propofol drip    Off pressor  Patient have a wound vacuum      6/1    Patient scheduled for surgery today    6/2    Surgery was canceled yesterday  As overall prognosis very poor  Patient on vent on 21% O2  Getting PEG tube feeding  Wound vacuum intact      6/3    Patient still on ventilator  Seen by vascular surgeon left foot concern of ischemic changes    6/4    Patient extubated /tachypneic daughter at the bedside    6/5    Patient on BiPAP  30% O2          Objective:     Visit Vitals  /60 (BP 1 Location: Right upper arm, BP Patient Position: At rest)   Pulse 90   Temp 97.5 °F (36.4 °C)   Resp 28   Ht 5' 6.93\" (1.7 m)   Wt 95.1 kg (209 lb 10.5 oz)   SpO2 99%   Breastfeeding No   BMI 32.91 kg/m²        Recent Results (from the past 24 hour(s))   GLUCOSE, POC    Collection Time: 06/04/22 12:13 PM   Result Value Ref Range    Glucose (POC) 194 (H) 65 - 117 mg/dL    Performed by [address] [place] [address] Dr Zelaya, POC    Collection Time: 06/04/22  5:59 PM   Result Value Ref Range    Glucose (POC) 197 (H) 65 - 117 mg/dL    Performed by [address] [place] [address] Dr Zelaya, POC    Collection Time: 06/04/22 11:48 PM   Result Value Ref Range    Glucose (POC) 241 (H) 65 - 117 mg/dL    Performed by Barbara Pearce    BLOOD GAS, ARTERIAL    Collection Time: 06/05/22  2:35 AM   Result Value Ref Range    pH 7.34 (L) 7.35 - 7.45      PCO2 36 35 - 45 mmHg    PO2 60 (L) 75 - 100 mmHg    O2 SAT 91 (L) >95 %    BICARBONATE 20 (L) 22 - 26 mmol/L    BASE DEFICIT 5.9 (H) 0 - 2 mmol/L    O2 METHOD Nasal Cannula      O2 FLOW RATE 3 L/min    SITE Right Radial      EVELIN'S TEST PASS     CBC W/O DIFF    Collection Time: 06/05/22  3:00 AM   Result Value Ref Range    WBC 15.0 (H) 3.6 - 11.0 K/uL    RBC 3.24 (L) 3.80 - 5.20 M/uL    HGB 9.5 (L) 11.5 - 16.0 g/dL    HCT 29.7 (L) 35.0 - 47.0 %    MCV 91.7 80.0 - 99.0 FL    MCH 29.3 26.0 - 34.0 PG    MCHC 32.0 30.0 - 36.5 g/dL    RDW 17.1 (H) 11.5 - 14.5 %    PLATELET 936 722 - 887 K/uL    MPV 11.0 8.9 - 12.9 FL    NRBC 0.0 0.0  WBC    ABSOLUTE NRBC 0.00 0.00 - 0.23 K/uL   METABOLIC PANEL, COMPREHENSIVE    Collection Time: 06/05/22  3:00 AM   Result Value Ref Range    Sodium 143 136 - 145 mmol/L    Potassium 4.3 3.5 - 5.1 mmol/L    Chloride 114 (H) 97 - 108 mmol/L    CO2 22 21 - 32 mmol/L    Anion gap 7 5 - 15 mmol/L    Glucose 245 (H) 65 - 100 mg/dL    BUN 29 (H) 6 - 20 mg/dL    Creatinine 1.01 0.55 - 1.02 mg/dL    BUN/Creatinine ratio 29 (H) 12 - 20      GFR est AA >60 >60 ml/min/1.73m2    GFR est non-AA 53 (L) >60 ml/min/1.73m2    Calcium 9.0 8.5 - 10.1 mg/dL    Bilirubin, total 0.5 0.2 - 1.0 mg/dL    AST (SGOT) 16 15 - 37 U/L    ALT (SGPT) 41 12 - 78 U/L    Alk.  phosphatase 134 (H) 45 - 117 U/L    Protein, total 7.6 6.4 - 8.2 g/dL    Albumin 1.5 (L) 3.5 - 5.0 g/dL    Globulin 6.1 (H) 2.0 - 4.0 g/dL    A-G Ratio 0.2 (L) 1.1 - 2.2     VANCOMYCIN, RANDOM    Collection Time: 06/05/22  3:00 AM   Result Value Ref Range    Vancomycin, random 15.3 ug/mL   GLUCOSE, POC    Collection Time: 06/05/22  6:00 AM   Result Value Ref Range    Glucose (POC) 275 (H) 65 - 117 mg/dL    Performed by [employer] [unfilled]      Review of Systems    Not a good historian e. Physical Exam:      Constitutional: On ventilator  HENT:   Head: Normocephalic and atraumatic. Eyes: Pupils are equal, round, and reactive to light. EOM are normal.   Cardiovascular: Normal rate, regular rhythm and normal heart sounds. Pulmonary/Chest: Breath sounds normal. No wheezes. No rales. Exhibits no tenderness. Abdominal: Soft. Bowel sounds are normal. There is no abdominal tenderness. There is no rebound and no guarding. Musculoskeletal: Normal range of motion. Neurological: On ventilator   skin sacral decubitus ulcer and left foot wound right big toe amputation    XR CHEST PORT   Final Result   Worsening lung aeration. XR CHEST PORT   Final Result   No significant change. XR CHEST PORT   Final Result   No significant change. XR CHEST PORT   Final Result      XR CHEST PORT   Final Result      XR CHEST PORT   Final Result   No interval change or acute process. XR CHEST PORT   Final Result   ET tube retracted from previous, with tip now at the level of the   thoracic inlet, 8-9 cm above the jennifer. Stable appearance of right central   line. Stable mild enlargement of cardiopericardial silhouette. No evidence of   pulmonary edema, air space pneumonia, or pleural effusion. No evidence of   pneumothorax. Some structures are partially obscured by overlying monitoring   electrodes. XR CHEST PORT   Final Result   Basilar airspace disease, possibly subsegmental atelectasis, stable. XR CHEST PORT   Final Result   Cardiomegaly with vascular congestion. Stable appearance of ET tube. Right central line placed, without radiographic evidence of complication. IR INSERT NON TUNL CVC OVER 5 YRS   Final Result   Successful placement of a triple-lumen central venous catheter. The   catheter is ready for use.       XR CHEST PORT   Final Result      XR CHEST PORT   Final Result      XR CHEST PORT   Final Result      CT ABD PELV WO CONT   Final Result   Third spacing of fluids with small volume ascites, small-moderate   pleural effusions, and mild body wall edema. Bibasilar atelectasis. XR CHEST PORT   Final Result   Similar findings compared to single view chest 1 day earlier. XR CHEST PORT   Final Result   Findings/IMPRESSION: Stable appearance of endotracheal tube. Stable mild   enlargement of cardiomediastinal silhouette. Central vascular congestion with   bilateral perihilar and basilar opacities, consistent with edema and/or   pneumonia, right greater than left. Possible right pleural effusion. No   pneumothorax. XR CHEST PORT   Final Result   Bibasilar opacities, possibly increased on the right. XR CHEST PORT   Final Result   No significant change allowing for difference in technique and   positioning. Single portable AP view compared to yesterday. Suboptimal inspiratory film   compromises visualization of the lower lung fields. Monitoring equipment   overlies the body. The tip of the tube is approximately 3 cm above the jennifer. Mild apparent cardiomegaly. Left heart border and left diaphragm obscured. Hazy airspace opacification both lung bases may be a combination of atelectasis,   pneumonia, or edema. No large effusion. Negative for pneumothorax. XR CHEST PORT   Final Result   No significant change allowing for difference in technique and   positioning. Single portable AP view compared to yesterday. Rotation to the right. Monitoring   equipment overlies the body. Suboptimal inspiratory film compromises   visualization of the lower lung fields. Mild cardiomegaly. The tip of the ET tube is approximately 3 cm above the jennifer. Mild basal interstitial edema. No new consolidation. No pleural effusion or   pneumothorax. XR CHEST PORT   Final Result   1. The endotracheal tube is in satisfactory position.    2.  There has been a partial interval resolution in the pulmonary interstitial   edema pattern. XR CHEST PORT   Final Result   Ill-defined haziness in the mid to lower lung zones suspect for mild   atelectatic changes and/or interstitial fluid or pleural fluid. US ABD LTD   Final Result   1. Question pericholecystic fluid. No identified gallstones or sludge. 2. Right pleural effusion. 3. Increased right renal echotexture for medical renal disease. XR CHEST PORT   Final Result   Intubation. Findings suggesting hydrostatic edema. XR CHEST PORT   Final Result   No evidence of an acute cardiopulmonary process.       IR FLUORO GUIDE PLC CVAD    (Results Pending)   IR US GUIDED VASCULAR ACCESS    (Results Pending)        Recent Results (from the past 24 hour(s))   GLUCOSE, POC    Collection Time: 06/04/22 12:13 PM   Result Value Ref Range    Glucose (POC) 194 (H) 65 - 117 mg/dL    Performed by Erasmo Leigh    GLUCOSE, POC    Collection Time: 06/04/22  5:59 PM   Result Value Ref Range    Glucose (POC) 197 (H) 65 - 117 mg/dL    Performed by Erasmo Leigh    GLUCOSE, POC    Collection Time: 06/04/22 11:48 PM   Result Value Ref Range    Glucose (POC) 241 (H) 65 - 117 mg/dL    Performed by Luis F Pearce    BLOOD GAS, ARTERIAL    Collection Time: 06/05/22  2:35 AM   Result Value Ref Range    pH 7.34 (L) 7.35 - 7.45      PCO2 36 35 - 45 mmHg    PO2 60 (L) 75 - 100 mmHg    O2 SAT 91 (L) >95 %    BICARBONATE 20 (L) 22 - 26 mmol/L    BASE DEFICIT 5.9 (H) 0 - 2 mmol/L    O2 METHOD Nasal Cannula      O2 FLOW RATE 3 L/min    SITE Right Radial      EVELIN'S TEST PASS     CBC W/O DIFF    Collection Time: 06/05/22  3:00 AM   Result Value Ref Range    WBC 15.0 (H) 3.6 - 11.0 K/uL    RBC 3.24 (L) 3.80 - 5.20 M/uL    HGB 9.5 (L) 11.5 - 16.0 g/dL    HCT 29.7 (L) 35.0 - 47.0 %    MCV 91.7 80.0 - 99.0 FL    MCH 29.3 26.0 - 34.0 PG    MCHC 32.0 30.0 - 36.5 g/dL    RDW 17.1 (H) 11.5 - 14.5 %    PLATELET 276 070 - 419 K/uL    MPV 11.0 8.9 - 12.9 FL    NRBC 0.0 0.0 PER 100 WBC    ABSOLUTE NRBC 0.00 0.00 - 9.65 K/uL   METABOLIC PANEL, COMPREHENSIVE    Collection Time: 06/05/22  3:00 AM   Result Value Ref Range    Sodium 143 136 - 145 mmol/L    Potassium 4.3 3.5 - 5.1 mmol/L    Chloride 114 (H) 97 - 108 mmol/L    CO2 22 21 - 32 mmol/L    Anion gap 7 5 - 15 mmol/L    Glucose 245 (H) 65 - 100 mg/dL    BUN 29 (H) 6 - 20 mg/dL    Creatinine 1.01 0.55 - 1.02 mg/dL    BUN/Creatinine ratio 29 (H) 12 - 20      GFR est AA >60 >60 ml/min/1.73m2    GFR est non-AA 53 (L) >60 ml/min/1.73m2    Calcium 9.0 8.5 - 10.1 mg/dL    Bilirubin, total 0.5 0.2 - 1.0 mg/dL    AST (SGOT) 16 15 - 37 U/L    ALT (SGPT) 41 12 - 78 U/L    Alk.  phosphatase 134 (H) 45 - 117 U/L    Protein, total 7.6 6.4 - 8.2 g/dL    Albumin 1.5 (L) 3.5 - 5.0 g/dL    Globulin 6.1 (H) 2.0 - 4.0 g/dL    A-G Ratio 0.2 (L) 1.1 - 2.2     VANCOMYCIN, RANDOM    Collection Time: 06/05/22  3:00 AM   Result Value Ref Range    Vancomycin, random 15.3 ug/mL   GLUCOSE, POC    Collection Time: 06/05/22  6:00 AM   Result Value Ref Range    Glucose (POC) 275 (H) 65 - 117 mg/dL    Performed by [facility]        Results     Procedure Component Value Units Date/Time    CULTURE, Romina Staff STAIN [[id number]] Collected: 06/01/22 1900    Order Status: Canceled Specimen: Wound from Foot     CULTURE, Romina Staff STAIN [[id number]] Collected: 06/01/22 1845    Order Status: Canceled Specimen: Wound from PEG Site     CULTURE, Romina Staff STAIN [[id number]] Collected: 05/30/22 1800    Order Status: Canceled Specimen: Sacral Wound     CULTURE, TISSUE Elonda Locker STAIN [[id number]] Collected: 05/23/22 1815    Order Status: Completed Specimen: Tissue Updated: 05/26/22 1510     Special Requests: No Special Requests        GRAM STAIN Rare WBCs seen         Few Gram Negative Rods        Culture result: Heavy  Mixed skin yasmine isolated              Labs:     Recent Labs     06/05/22  0300 06/04/22  0400   WBC 15.0* 17.7*   HGB 9.5* 10.1*   HCT 29.7* 31.4*    366     Recent Labs     06/05/22  0300 06/04/22  0400 06/03/22  0454    141 145   K 4.3 4.0 3.3*   * 113* 116*   CO2 22 21 22   BUN 29* 22* 22*   CREA 1.01 0.87 0.70   * 192* 86   CA 9.0 9.0 8.9   MG  --   --  1.8   PHOS  --   --  2.8     Recent Labs     06/05/22  0300 06/04/22  0400 06/03/22  0454   ALT 41 48 51   * 130* 139*   TBILI 0.5 0.5 0.4   TP 7.6 7.5 7.2   ALB 1.5* 1.5* 1.4*   GLOB 6.1* 6.0* 5.8*     No results for input(s): INR, PTP, APTT, INREXT, INREXT in the last 72 hours. No results for input(s): FE, TIBC, PSAT, FERR in the last 72 hours. No results found for: FOL, RBCF   Recent Labs     06/05/22  0235   PH 7.34*   PCO2 36   PO2 60*     No results for input(s): CPK, CKNDX, TROIQ in the last 72 hours.     No lab exists for component: CPKMB  Lab Results   Component Value Date/Time    Cholesterol, total 124 03/08/2022 07:40 AM    HDL Cholesterol 30 03/08/2022 07:40 AM    LDL,Direct 79 06/28/2021 12:00 AM    LDL, calculated 44.8 03/08/2022 07:40 AM    Triglyceride 202 (H) 05/16/2022 06:20 AM    CHOL/HDL Ratio 4.1 03/08/2022 07:40 AM     Lab Results   Component Value Date/Time    Glucose (POC) 275 (H) 06/05/2022 06:00 AM    Glucose (POC) 241 (H) 06/04/2022 11:48 PM    Glucose (POC) 197 (H) 06/04/2022 05:59 PM    Glucose (POC) 194 (H) 06/04/2022 12:13 PM    Glucose (POC) 186 (H) 06/04/2022 06:21 AM     Lab Results   Component Value Date/Time    Color Yellow/Straw 05/31/2022 05:56 AM    Appearance Turbid (A) 05/31/2022 05:56 AM    Specific gravity 1.006 05/31/2022 05:56 AM    pH (UA) 5.0 05/31/2022 05:56 AM    Protein 30 (A) 05/31/2022 05:56 AM    Glucose Negative 05/31/2022 05:56 AM    Ketone Negative 05/31/2022 05:56 AM    Bilirubin Negative 05/31/2022 05:56 AM    Urobilinogen 0.1 05/31/2022 05:56 AM    Nitrites Negative 05/31/2022 05:56 AM    Leukocyte Esterase Large (A) 05/31/2022 05:56 AM    Bacteria Negative 05/31/2022 05:56 AM    WBC >100 (H) 05/31/2022 05:56 AM    RBC  05/31/2022 05:56 AM         Assessment:   Acute respiratory failure extubated   sacral decubitus ulcer postdebridement  Sepsis with leukocytosis elevated procalcitonin and CRP  Left foot wound  Recent removal of left great toe and second toe  CVA with PEG tube placement  History of aspiration pneumonia  History of chronic kidney disease  CAD with ejection fraction about 20 to 25%  Hypertension  Hyperlipidemia  Anemia of chronic disease  Hyperkalemia  Static post transfusion  Uncontrolled diabetes  Hepatic shock/improving  Coagulopathy  Elevated  Troponin  Bacteremia with coagulase-negative Staphylococcus  Procedure:  1. Left transmetatarsal amputation. 2.  Sacral wound excisional wound debridement 13 x 15 cm to the bone.     Hypotension on Levophed  Hypokalemia/replace potassium  Severe protein calorie malnutrition  Plan:       Unasyn 3 g IV every 8 hours  Aspirin 81 mg daily  Questran 4 g twice a day  Lovenox 40 subcu daily  Pepcid 20 bid twice a day  Ferrous sulfate 300 mg twice a day  Flucanazole 200 mg daily  Lasix 40 mg every 12 hours  Gabapentin 200 mg twice a day  Robinul 0.1 mg 3 times a day  Hydralazine 12.5 mg 3 times a day  Lantus 10 units subcu at bedtime  Lantus 50 units subcu in the morning  Lopressor 12.5 mg twice a day  Entresto 24/26 1 tab twice daily  Vancomycin with pharmacy protocol  Repeat  the labs overall prognosis very poor    Detailed discussion with the patient's daughter about poor prognosis   At bedside  laparoscopic loop colostomy for fecal diversion canceled        Continue current medications    Discussed with ICU nurse           Current Facility-Administered Medications:     [START ON 6/6/2022] Vancomycin - Please draw random level 6/6 @ 0400, , Other, ONCE, Paulino, Yonathan Lan MD    ferrous sulfate 300 mg (60 mg iron)/5 mL oral syrup 300 mg, 300 mg, Per NG tube, BID WITH MEALS, Israel Soto MD, 300 mg at 06/05/22 0831    metoprolol tartrate (LOPRESSOR) tablet 12.5 mg, 12.5 mg, Per NG tube, BID, Israel Soto MD, 12.5 mg at 06/05/22 0831    alteplase (CATHFLO) injection 2 mg, 2 mg, InterCATHeter, PRN, Buddy Domingo MD, 2 mg at 06/03/22 1708    glycopyrrolate (ROBINUL) injection 0.1 mg, 0.1 mg, IntraVENous, TID, Martínez Crespo MD, 0.1 mg at 06/05/22 0831    fluconazole (DIFLUCAN) 200mg/100 mL IVPB (premix), 200 mg, IntraVENous, Q24H, Norberto Lyle MD, Last Rate: 100 mL/hr at 06/05/22 0830, 200 mg at 06/05/22 0830    aspirin chewable tablet 81 mg, 81 mg, Per G Tube, DAILY, Israel Soto MD, 81 mg at 06/05/22 0831    NOREPINephrine (LEVOPHED) 8 mg in 0.9% NS 250ml infusion, 0.5-16 mcg/min, IntraVENous, TITRATE, Israel Soto MD, Paused at 05/29/22 2046    sodium chloride (NS) flush 5-40 mL, 5-40 mL, IntraVENous, Q8H, Karla Matthew MD, 10 mL at 06/05/22 0516    sodium chloride (NS) flush 5-40 mL, 5-40 mL, IntraVENous, PRN, Rosy Gould MD, 10 mL at 05/31/22 2213    furosemide (LASIX) injection 20 mg, 20 mg, IntraVENous, Q12H, Codi Bob MD, 20 mg at 06/05/22 0835    [DISCONTINUED] ondansetron (ZOFRAN ODT) tablet 4 mg, 4 mg, Oral, Q8H PRN **OR** ondansetron (ZOFRAN) injection 4 mg, 4 mg, IntraVENous, Q6H PRN, Codi Bob MD    enoxaparin (LOVENOX) injection 40 mg, 40 mg, SubCUTAneous, DAILY, Codi Bob MD, 40 mg at 06/05/22 0830    dextrose 5% infusion, 30 mL/hr, IntraVENous, CONTINUOUS, Kalpesh Lara MD, Last Rate: 30 mL/hr at 06/03/22 0058, 30 mL/hr at 06/03/22 0058    insulin lispro (HUMALOG) injection, , SubCUTAneous, Q6H, Codi Bob MD, 3 Units at 06/05/22 0630    glucose chewable tablet 16 g, 4 Tablet, Oral, PRN, Codi Bob MD    glucagon [facility]) injection 1 mg, 1 mg, IntraMUSCular, PRN, Codi Bob MD    hydrALAZINE (APRESOLINE) tablet 12.5 mg, 12.5 mg, Oral, TID, Codi Bob MD, 12.5 mg at 06/05/22 2632    sacubitriL-valsartan (ENTRESTO) 24-26 mg tablet 1 Tablet, 1 Tablet, Oral, Q12H, Alfonzo Prader, MD, 1 Tablet at 06/05/22 0831    ampicillin-sulbactam (UNASYN) 3 g in 0.9% sodium chloride (MBP/ADV) 100 mL MBP, 3 g, IntraVENous, Q8H, Yonathan Bob MD, Last Rate: 200 mL/hr at 06/05/22 0511, 3 g at 06/05/22 0511    dextrose 10% infusion 0-250 mL, 0-250 mL, IntraVENous, PRN, Yonathan Bob MD, 125 mL at 05/19/22 0537    insulin glargine (LANTUS) injection 10 Units, 10 Units, SubCUTAneous, QHS, Yonathan Bob MD, 10 Units at 06/05/22 0011    propofol (DIPRIVAN) 10 mg/mL infusion, 0-50 mcg/kg/min, IntraVENous, TITRATE, Yonathan Bob MD, Last Rate: 9.5 mL/hr at 06/03/22 0051, 20 mcg/kg/min at 06/03/22 0051    0.9% sodium chloride infusion 250 mL, 250 mL, IntraVENous, PRN, Yonathan Bob MD, Last Rate: 15 mL/hr at 05/22/22 0820, Rate Verify at 05/22/22 0820    Vancomycin Pharmacy Dosing, , Other, Rx Dosing/Monitoring, Yonathan Bob MD    acetaminophen (TYLENOL) tablet 650 mg, 650 mg, Oral, Q6H PRN, 650 mg at 05/22/22 0556 **OR** acetaminophen (TYLENOL) suppository 650 mg, 650 mg, Rectal, Q6H PRN, Yonathan Bob MD, 650 mg at 05/16/22 2223    polyethylene glycol (MIRALAX) packet 17 g, 17 g, Oral, DAILY PRN, Yonathan Bob MD    ondansetron (ZOFRAN ODT) tablet 4 mg, 4 mg, Oral, Q8H PRN **OR** [DISCONTINUED] ondansetron (ZOFRAN) injection 4 mg, 4 mg, IntraVENous, Q6H PRN, Yonathan Bob MD    famotidine (PEPCID) tablet 20 mg, 20 mg, Oral, BID, Yonathan Bob MD, 20 mg at 06/05/22 0831    acidophilus-pectin, citrus tablet 2 Tablet, 2 Tablet, Oral, DAILY, Yonathan Bob MD, 2 Tablet at 06/05/22 0831    albuterol-ipratropium (DUO-NEB) 2.5 MG-0.5 MG/3 ML, 3 mL, Nebulization, Q6H PRN, Yonathan Bob MD    artificial saliva (MOUTH KOTE) 1 Spray, 1 Spray, Oral, TID, Yonathan Bob MD, 1 [place] at 06/05/22 0832    brimonidine (ALPHAGAN) 0.2 % ophthalmic solution 1 Drop, 1 Drop, Both Eyes, TID, Yonathan Bob MD, 1 Drop at 06/05/22 0832    cholestyramine-aspartame ([place]) packet 4 g, 4 g, Oral, BID WITH MEALS, Radha Bob MD, 4 g at 06/05/22 0831    [Held by provider] fenofibrate nanocrystallized (TRICOR) tablet 48 mg, 48 mg, Oral, DAILY, Radha Bob MD, 48 mg at 05/19/22 2267    gabapentin (NEURONTIN) capsule 300 mg, 300 mg, Oral, BID, Radha Bob MD, 300 mg at 06/05/22 0831    insulin glargine (LANTUS) injection 50 Units, 50 Units, SubCUTAneous, DAILY, Radha Bob MD, 50 Units at 06/05/22 0835    latanoprost (XALATAN) 0.005 % ophthalmic solution 1 Drop, 1 Drop, Right Eye, QPM, Radha Bob MD, 1 Drop at 06/04/22 1709    traMADoL (ULTRAM) tablet 25 mg, 25 mg, Oral, Q12H PRN, Radha Bob MD, 25 mg at 06/03/22 1717

## 2022-06-05 NOTE — PROGRESS NOTES
IMPRESSION:   1. Acute hypoxic respiratory failure extubated/3 had to add noninvasive ventilator last night due to respiratory distress  2. Pulmonary edema mild  3. Aspiration chronically  4. Sacral decubiti ulcer with Acinetobacter and Enterococcus  5. Severe sepsis with septic shock resolved on no pressors  6. Left foot wound  7. Transaminitis   8. Shock liver resolved  9. Hypokalemia repleted  10. Symptomatic anemia stable after transfusion  11. Cardiomyopathy ejection fraction 50 to 55%  12. Mild pulmonary edema      RECOMMENDATIONS/PLAN:   1. ICU monitoring  1. Extubated on 6/3 worsening tachypnea last evening placed on noninvasive ventilator likely recurrent aspiration and fluid overload  2. Will increase dose Lasix  3. Will increase Robinul  4. You to monitor hemoglobin  5. Patient underwent on 5/23 left metatarsal amputation and sacral wound debridement  6. Severe sepsis with decubiti ulcer   7. Patient is on Unasyn  8. Chest x-ray shows mild congestive changes with fluid in the minor fissure  9. [x] High complexity decision making was performed  [x] See my orders for details  HPI   68-year-old lady came in because of leg pain past medical history of hypertension diabetes mellitus peripheral vascular disease CVA she is bedbound she has leg wound and also has sacral decubiti ulcer and she was scheduled for laparoscopic colostomy and sacral wound debridement and amputation of the left tarsometatarsal because of wound infection but her condition got worse her liver enzyme was elevated INR was also elevated she was intubated transferred to ICU was getting vancomycin and Unasyn started on Levophed because of low blood pressure.     PMH:  has a past medical history of Anemia, Chronic kidney disease, Diabetes mellitus ([address] Hemiplegia affecting dominant side, post-stroke ([address] (05/04/2022), HTN (hypertension), Hyperkalemia, Hyperlipidemia, Ill-defined condition, Neuropathy, PAD (peripheral artery disease) ([address] Sciatica, Stroke ([facility] [address] and UTI (urinary tract infection). PSH:   has a past surgical history that includes hx other surgical (Bilateral); hx amputation (Right); hx other surgical; hx cervical fusion; hx vascular stent (Bilateral); hx vascular stent (Bilateral); hx gi; and ir insert non tunl cvc over 5 yrs (5/27/2022). FHX: family history includes Cancer in her mother; Diabetes in her mother; Hypertension in her mother. SHX:  reports that she has never smoked. She has never used smokeless tobacco. She reports previous alcohol use. She reports that she does not use drugs.     ALL: No Known Allergies     MEDS:   [x] Reviewed - As Below   [] Not reviewed    Current Facility-Administered Medications   Medication    [START ON 6/6/2022] Vancomycin - Please draw random level 6/6 @ 0400    ferrous sulfate 300 mg (60 mg iron)/5 mL oral syrup 300 mg    metoprolol tartrate (LOPRESSOR) tablet 12.5 mg    alteplase (CATHFLO) injection 2 mg    glycopyrrolate (ROBINUL) injection 0.1 mg    fluconazole (DIFLUCAN) 200mg/100 mL IVPB (premix)    aspirin chewable tablet 81 mg    NOREPINephrine (LEVOPHED) 8 mg in 0.9% NS 250ml infusion    sodium chloride (NS) flush 5-40 mL    sodium chloride (NS) flush 5-40 mL    furosemide (LASIX) injection 20 mg    ondansetron (ZOFRAN) injection 4 mg    enoxaparin (LOVENOX) injection 40 mg    dextrose 5% infusion    insulin lispro (HUMALOG) injection    glucose chewable tablet 16 g    glucagon (GLUCAGEN) injection 1 mg    hydrALAZINE (APRESOLINE) tablet 12.5 mg    sacubitriL-valsartan (ENTRESTO) 24-26 mg tablet 1 Tablet    ampicillin-sulbactam (UNASYN) 3 g in 0.9% sodium chloride (MBP/ADV) 100 mL MBP    dextrose 10% infusion 0-250 mL    insulin glargine (LANTUS) injection 10 Units    propofol (DIPRIVAN) 10 mg/mL infusion    0.9% sodium chloride infusion 250 mL    Vancomycin Pharmacy Dosing    acetaminophen (TYLENOL) tablet 650 mg    Or    acetaminophen (TYLENOL) suppository 650 mg    polyethylene glycol (MIRALAX) packet 17 g    ondansetron (ZOFRAN ODT) tablet 4 mg    famotidine (PEPCID) tablet 20 mg    acidophilus-pectin, citrus tablet 2 Tablet    albuterol-ipratropium (DUO-NEB) 2.5 MG-0.5 MG/3 ML    artificial saliva (MOUTH KOTE) 1 Spray    brimonidine (ALPHAGAN) 0.2 % ophthalmic solution 1 Drop    cholestyramine-aspartame (QUESTRAN LIGHT) packet 4 g    [Held by provider] fenofibrate nanocrystallized (TRICOR) tablet 48 mg    gabapentin (NEURONTIN) capsule 300 mg    insulin glargine (LANTUS) injection 50 Units    latanoprost (XALATAN) 0.005 % ophthalmic solution 1 Drop    traMADoL (ULTRAM) tablet 25 mg      MAR reviewed and pertinent medications noted or modified as needed   Current Facility-Administered Medications   Medication    [START ON 6/6/2022] Vancomycin - Please draw random level 6/6 @ 0400    ferrous sulfate 300 mg (60 mg iron)/5 mL oral syrup 300 mg    metoprolol tartrate (LOPRESSOR) tablet 12.5 mg    alteplase (CATHFLO) injection 2 mg    glycopyrrolate (ROBINUL) injection 0.1 mg    fluconazole (DIFLUCAN) 200mg/100 mL IVPB (premix)    aspirin chewable tablet 81 mg    NOREPINephrine (LEVOPHED) 8 mg in 0.9% NS 250ml infusion    sodium chloride (NS) flush 5-40 mL    sodium chloride (NS) flush 5-40 mL    furosemide (LASIX) injection 20 mg    ondansetron (ZOFRAN) injection 4 mg    enoxaparin (LOVENOX) injection 40 mg    dextrose 5% infusion    insulin lispro (HUMALOG) injection    glucose chewable tablet 16 g    glucagon (GLUCAGEN) injection 1 mg    hydrALAZINE (APRESOLINE) tablet 12.5 mg    sacubitriL-valsartan (ENTRESTO) 24-26 mg tablet 1 Tablet    ampicillin-sulbactam (UNASYN) 3 g in 0.9% sodium chloride (MBP/ADV) 100 mL MBP    dextrose 10% infusion 0-250 mL    insulin glargine (LANTUS) injection 10 Units    propofol (DIPRIVAN) 10 mg/mL infusion    0.9% sodium chloride infusion 250 mL    Vancomycin Pharmacy Dosing    acetaminophen (TYLENOL) tablet 650 mg    Or    acetaminophen (TYLENOL) suppository 650 mg    polyethylene glycol (MIRALAX) packet 17 g    ondansetron (ZOFRAN ODT) tablet 4 mg    famotidine (PEPCID) tablet 20 mg    acidophilus-pectin, citrus tablet 2 Tablet    albuterol-ipratropium (DUO-NEB) 2.5 MG-0.5 MG/3 ML    artificial saliva (MOUTH KOTE) 1 Spray    brimonidine (ALPHAGAN) 0.2 % ophthalmic solution 1 Drop    cholestyramine-aspartame (QUESTRAN LIGHT) packet 4 g    [Held by provider] fenofibrate nanocrystallized (TRICOR) tablet 48 mg    gabapentin (NEURONTIN) capsule 300 mg    insulin glargine (LANTUS) injection 50 Units    latanoprost (XALATAN) 0.005 % ophthalmic solution 1 Drop    traMADoL (ULTRAM) tablet 25 mg      PMH:  has a past medical history of Anemia, Chronic kidney disease, Diabetes mellitus ([address] Hemiplegia affecting dominant side, post-stroke ([facility] [address] (05/04/2022), HTN (hypertension), Hyperkalemia, Hyperlipidemia, Ill-defined condition, Neuropathy, PAD (peripheral artery disease) ([facility] [address] Sciatica, Stroke ([address] and UTI (urinary tract infection). PSH:   has a past surgical history that includes hx other surgical (Bilateral); hx amputation (Right); hx other surgical; hx cervical fusion; hx vascular stent (Bilateral); hx vascular stent (Bilateral); hx gi; and ir insert non tunl cvc over 5 yrs (5/27/2022). FHX: family history includes Cancer in her mother; Diabetes in her mother; Hypertension in her mother. SHX:  reports that she has never smoked. She has never used smokeless tobacco. She reports previous alcohol use. She reports that she does not use drugs. ROS:  Unable to obtain intubated on ventilator    Hemodynamics:    CO:    CI:    CVP:    SVR:   PAP Systolic:    PAP Diastolic:    PVR:    BX78:        Ventilator Settings:      Mode Rate TV Press PEEP FiO2 PIP Min.  Vent   Pressure support,Spontaneous    500 ml 15 cm H2O  5 cm H20 30 %  27 cm H2O  14.2 l/min        Vital Signs: Telemetry:    normal sinus rhythm Intake/Output:   Visit Vitals  /60 (BP 1 Location: Right upper arm, BP Patient Position: At rest)   Pulse 90   Temp 97.5 °F (36.4 °C)   Resp 28   Ht 5' 6.93\" (1.7 m)   Wt 95.1 kg (209 lb 10.5 oz)   SpO2 99%   Breastfeeding No   BMI 32.91 kg/m²       Temp (24hrs), Av.3 °F (36.8 °C), Min:97.5 °F (36.4 °C), Max:98.9 °F (37.2 °C)        O2 Device: BIPAP O2 Flow Rate (L/min): 3 l/min       Wt Readings from Last 4 Encounters:   22 95.1 kg (209 lb 10.5 oz)   04/10/22 86.6 kg (191 lb)   22 82.2 kg (181 lb 3.5 oz)   20 84.4 kg (186 lb)          Intake/Output Summary (Last 24 hours) at 2022 1103  Last data filed at 2022 0755  Gross per 24 hour   Intake 3110 ml   Output 1425 ml   Net 1685 ml       Last shift:       07 -  1900  In: 75   Out: 410 [Drains:410]  Last 3 shifts:  190 -  0700  In: 5596 [I.V.:855]  Out: 1815 [Urine:1725; Drains:90]       Physical Exam:     General: On noninvasive ventilator weakly tries to open her eyes follows no commands  HEENT: NCAT,  Eyes: anicteric; conjunctiva clear, random eye movement when eyelids are open  Neck: no nodes, , trach midline; no accessory MM use. Neck veins obscured by obesity but seems slightly engorged  Chest: no deformity,   Cardiac: R regular; no murmur;   Lungs: Shallow breaths with diffuse wheezes and rhonchi  Abd: soft, NT, hypoactive BS  Ext: Diffuse edema; no joint swelling;  No clubbing  :clear urine  Neuro: Seems to weakly try to open her eyes follows no commands does not speak does not move her extremities  Psych-  unable to assess  Skin: warm, dry, no cyanosis;   Pulses: Brachial and radial pulses intact  Capillary: Normal capillary refill      DATA:    MAR reviewed and pertinent medications noted or modified as needed  MEDS:   Current Facility-Administered Medications   Medication    [START ON 2022] Vancomycin - Please draw random level  @ 0400    ferrous sulfate 300 mg (60 mg iron)/5 mL oral syrup 300 mg    metoprolol tartrate (LOPRESSOR) tablet 12.5 mg    alteplase (CATHFLO) injection 2 mg    glycopyrrolate (ROBINUL) injection 0.1 mg    fluconazole (DIFLUCAN) 200mg/100 mL IVPB (premix)    aspirin chewable tablet 81 mg    NOREPINephrine (LEVOPHED) 8 mg in 0.9% NS 250ml infusion    sodium chloride (NS) flush 5-40 mL    sodium chloride (NS) flush 5-40 mL    furosemide (LASIX) injection 20 mg    ondansetron (ZOFRAN) injection 4 mg    enoxaparin (LOVENOX) injection 40 mg    dextrose 5% infusion    insulin lispro (HUMALOG) injection    glucose chewable tablet 16 g    glucagon (GLUCAGEN) injection 1 mg    hydrALAZINE (APRESOLINE) tablet 12.5 mg    sacubitriL-valsartan (ENTRESTO) 24-26 mg tablet 1 Tablet    ampicillin-sulbactam (UNASYN) 3 g in 0.9% sodium chloride (MBP/ADV) 100 mL MBP    dextrose 10% infusion 0-250 mL    insulin glargine (LANTUS) injection 10 Units    propofol (DIPRIVAN) 10 mg/mL infusion    0.9% sodium chloride infusion 250 mL    Vancomycin Pharmacy Dosing    acetaminophen (TYLENOL) tablet 650 mg    Or    acetaminophen (TYLENOL) suppository 650 mg    polyethylene glycol (MIRALAX) packet 17 g    ondansetron (ZOFRAN ODT) tablet 4 mg    famotidine (PEPCID) tablet 20 mg    acidophilus-pectin, citrus tablet 2 Tablet    albuterol-ipratropium (DUO-NEB) 2.5 MG-0.5 MG/3 ML    artificial saliva (MOUTH KOTE) 1 Spray    brimonidine (ALPHAGAN) 0.2 % ophthalmic solution 1 Drop    cholestyramine-aspartame (QUESTRAN LIGHT) packet 4 g    [Held by provider] fenofibrate nanocrystallized (TRICOR) tablet 48 mg    gabapentin (NEURONTIN) capsule 300 mg    insulin glargine (LANTUS) injection 50 Units    latanoprost (XALATAN) 0.005 % ophthalmic solution 1 Drop    traMADoL (ULTRAM) tablet 25 mg        Labs:    Recent Labs     06/05/22  0300 06/04/22  0400 06/03/22  0454   WBC 15.0* 17.7* 12.0*   HGB 9.5* 10.1* 9.4*    366 323     Recent Labs 22  0300 22  0400 22  0454    141 145   K 4.3 4.0 3.3*   * 113* 116*   CO2 22 21 22   * 192* 86   BUN 29* 22* 22*   CREA 1.01 0.87 0.70   CA 9.0 9.0 8.9   MG  --   --  1.8   PHOS  --   --  2.8   ALB 1.5* 1.5* 1.4*   ALT 41 48 51     Recent Labs     22  0235   PH 7.34*   PCO2 36   PO2 60*   HCO3 20*   6/5 and IV IP 16 EP 5 rate 16 FiO2 30%    AC 12  PEEP 5 FiO2 30%   echo ejection fraction 20% mild mitral regurg left atrium 3.5 cm RVSP 35  , 1313, 1361    Lab Results   Component Value Date/Time    Culture result: Heavy  Mixed skin yasmine isolated   2022 06:15 PM    Culture result: Rare Normal respiratory yasmine 2022 02:48 PM    Culture result: Heavy Acinetobacter baumannii complex 05/15/2022 07:15 PM    Culture result: Heavy Diphtheroids 05/15/2022 07:15 PM     Lab Results   Component Value Date/Time    TSH 2.880 2016 10:13 AM        Imagin/22 chest x-ray with ET tube in place hazy accentuated basilar markings with small amount of fluid in the minor fissure  Results from Hospital Encounter encounter on 22    XR CHEST PORT    Narrative  Chest one view. AP semiupright portable at 0209 dated 2022. Comparison 6/3/2022. The patient has been extubated. A central line remains in place. The heart  remains enlarged with calcified plaque in the aorta. There has been interval progression of bilateral perihilar and lower lobe  opacities as seen with CHF versus pneumonia. There is no pneumothorax. There is incompletely imaged cervical spine hardware. Impression  Worsening lung aeration. Results from [place] encounter on 22    CT ABD PELV WO CONT    Narrative  CT ABD PELV WO CONT    Technique: Noncontrasted CT scan of the abdomen and pelvis was performed  utilizing transaxial images obtained from the dome of the diaphragm to the pubic  symphysis.  Coronal and sagittal reconstructions were generated. Dose Reduction:  All CT scans at this facility are performed using dose reduction optimization  techniques as appropriate to a performed exam including the following: Automated  exposure control, adjustments of the mA and/or kV according to patient size, or  use of iterative reconstruction technique. Comparison:  2/25/2022. Findings:  Small-moderate pleural effusions are present with bibasilar  atelectasis. Atherosclerotic calcifications are again evident including coronary  artery calcifications. Gastrostomy tube tip in the gastric antrum. The liver, spleen, pancreas, and  adrenal glands are unremarkable for noncontrasted technique. Stable lobulated  kidneys. Negative for hydronephrosis. Gallbladder is unremarkable. No biliary  duct dilatation apparent. The abdominal aorta is normal in caliber. There is no evidence of bowel obstruction. Small volume ascites has developed. The urinary bladder is decompressed by a Bell catheter. The uterus is  unremarkable. The appendix is normal.    There is mild body wall edema. No suspicious lytic or blastic lesion evident. Impression  Third spacing of fluids with small volume ascites, small-moderate  pleural effusions, and mild body wall edema. Bibasilar atelectasis. 5/20 intubated on ventilator we will continue current vent settings patient is supposed to go for surgery because of transaminitis elevated liver enzyme we will wait as per surgeon for sacral wound debridement and diverting loop colostomy off Levophed, improvement in AST and ALT  5/21 continue with the current vent settings ABG acceptable liver enzyme improving awaiting for the surgery  5/22 maintain ventilator as is in anticipation of surgery.   Renal function improved mild congestion on chest x-ray we will give 1 dose Lasix and potassium  5/23 patient is going for surgery today we will leave ventilator as it is INR is 1.5  5/25 patient remained on ventilator intubated doing well, patient received vitamin K fresh frozen plasma schedule for laparoscopic loop colostomy possible today  5/26 no surgery has been plans will start weaning do sedation vacation  5/27 try to do sedation vacation to wean patient blood pressure dropped still on Levophed we will continue to wean discussed with the family at bedside  5/28 remains on ventilator sedated and also on Levophed possible surgery next week  5/29 on ventilator ET tube advisement 1 to 1.5 cm we will repeat chest x-ray ABG acceptable  5/30 continue with current vent setting  5/31 awaiting for possible surgery if not we will start weaning  6/1 remain intubated on ventilator surgery has been delayed we will start weaning and plan for extubation  6/2 no surgery was planned so we will try to wean she is off Levophed we will do sedation vacation and if weaning cardio acceptable will extubate  6/3 change vent settings to spontaneous if weaning creatinine acceptable will extubate  6/4 extubated on 6/3 now on nasal cannula tolerating well  6/5 respiratory distress last night placed on noninvasive ventilator chest x-ray continues to show fluid overload. Will increase Lasix to 40 mg twice daily give albumin today. Likely has continued aspiration.   Will increase Robinul to every 6 hours dosing  Time of care 30 minutes  Solange Rea MD

## 2022-06-05 NOTE — PROGRESS NOTES
Vancomycin Dosing Consult  Day #22 of vancomycin therapy  Consult ordered by Dr. John Augustin for this 78 y.o. female for indication of sacral wound infection, sepsis. Antibiotic regimen: Vancomycin + Unasyn  + fluconazole    Temp (24hrs), Av.4 °F (36.9 °C), Min:97.5 °F (36.4 °C), Max:99.2 °F (37.3 °C)    Recent Labs     22  0300 22  0400 22  0454   WBC 15.0* 17.7* 12.0*   CREA 1.01 0.87 0.70   BUN 29* 22* 22*     Est CrCl: ~55-60 mL/min; UO: 0.4 mL/kg/hr  Concomitant nephrotoxic drugs: Off vasopressors    Cultures:    Wound: Moderate MDR Pseudomonas aeruginosa susceptible to Zerbaxa, aminoglycosides), moderate mixed skin yasmine, moderate mixed enteric yasmine including yeast, final   Blood: CoNS in the anaerobic bottle, final   Wound, ulcer:  Moderate Acinetobacter baumannii, moderate Enterococcus faecium, light >2 organisms (contraindicated), final  5/15 Wound, great toe: Heavy Acinetobacter baumannii complex (Zosyn resistant, susceptible to Unasyn), heavy Diphtheroids, final   Tissue: Heavy mixed skin yasmine, final    MRSA Swab: Not ordered, patient already received first dose of vancomycin    Target range: Trough 10-15 mcg/mL    Recent level history:  Date/Time Dose & Interval Measured Level (mcg/mL)    @ 0445 750 mg x 1 19.8    @ 0321 Held 15.4    @ 0400 750 mg x 1 (given ) 13.6    @ 0300 500 mg x 1 15.3        Assessment/Plan:   Afebrile, continued leukocytosis, CRP and procal remain elevated  CKD, SCr trending up, clearance of vancomycin remains quite slow relative to renal function  Random level is marginally supratherapeutic with increasing SCr and decreasing UO, will hold dose today and check a random level tomorrow AM  Antimicrobial stop date TBD

## 2022-06-05 NOTE — PROGRESS NOTES
Surgery critical care Progress Note     Subjective:   Patient examined in ICU. Hospital Course: Patient has been extubated for 48 hours now. Patient is maintaining oxygen. Patient is awake however not quite coherent. Patient blood pressure is 145/79. Heart rates 80s. Patient has a significant amount of stool. However the wound seems to be spared with a wound VAC intact. Patient's left foot looks concerning showing signs of ischemia. Wound edges are dark. Subjective:  Unable to obtain. Review of Systems  Unable to obtain      Objective:      Visit Vitals  BP (!) 145/79 (BP 1 Location: Right upper arm, BP Patient Position: At rest)   Pulse 88   Temp 98.7 °F (37.1 °C)   Resp (!) 32   Ht 5' 6.93\" (1.7 m)   Wt 209 lb 10.5 oz (95.1 kg)   SpO2 100%   Breastfeeding No   BMI 32.91 kg/m²       Patient is awake   Head and neck atraumatic, normocephalic. ENT: No hoarse voice  Cardiac system regular rate rhythm. Pulmonary no audible wheeze  Chest wall excursion normal with respiration cycle  Abdomen is soft not particularly distended. Neurologically nonfocal.  Skin is warm and moist.  Psychosocial: Cooperative. Vascular examination as previously noted no changes. Data Review  Reviewed           Assessment / Plan:  Patient's left foot looks very concerning, currently wounds are covered with Xeroform gauze and Kerlix roll. Patient currently seems to be maintaining airway. Patient's left leg vascular status showing ischemic changes. I have updated family about this. Continue supportive care. We will follow. Critical care time spent 30minutes involving direct patient care as well as reviewing patient's labs and coordination of care with nursing staff     Care Plan discussed with: Patient/Family/RN/Case Management           Total time spent with patient: 30 minutes.

## 2022-06-06 NOTE — PROGRESS NOTES
Progress Note    Patient: Julio Alves MRN: [id number]  SSN: [id number]    YOB: 1945  Age: 78 y.o. Sex: female      Admit Date: 5/14/2022    LOS: 23 days     Subjective:   Patient followed for sacral wound infection with repeat culture growing Acinetobacter and Enterococci. Left foot wound also grew Acinetobacter, now status post left TMA. WBC now decreasing along with CRP and procal.  Urinalysis showing marked pyuria and yeasts for which she is on Fluconazole. She is currently on Vancomycin and Unasyn. Patient has now been extubated. She is awake but nonverbal.  CXR unchanged today. Objective:     Vitals:    06/06/22 1400 06/06/22 1500 06/06/22 1535 06/06/22 1600   BP: (!) 150/79 (!) 154/88  (!) 150/96   Pulse:  98  98   Resp:  (!) 98  (!) 98   Temp:  98.7 °F (37.1 °C)     SpO2: (!) 101%  100% 100%   Weight:       Height:            Intake and Output:  Current Shift: 06/06 0701 - 06/06 1900  In: 150   Out: 500 [Urine:500]  Last three shifts: 06/04 1901 - 06/06 0700  In: 7283 [I.V.:1055]  Out: 2410 [Urine:1900; Drains:510]    Physical Exam:    Vitals and nursing note reviewed. Constitutional:       General: She is not in acute distress. Appearance: She is ill-appearing. HENT: Nasal O2 cannula    Eyes: open        Cardiovascular:      Rate and Rhythm: Normal rate and regular rhythm. Heart sounds: No murmur heard. Pulmonary: clear bilaterally    Abdominal:      General: Bowel sounds are normal.      Palpations: Abdomen is soft. Tenderness: There is no abdominal tenderness. Comments: PEG site unremarkable today      Genitourinary:     Comments: Indwelling Bell with cloudy urine             Musculoskeletal:      Cervical back: Neck supple. Right lower leg: No edema. Left lower leg: No edema. No     Comments: Left foot surgical wound now shows dehiscence distally with bloody drainage  Skin:     Findings: No rash.              Comments: Sacral wound stool soiled with displaced wound vac sponge  Neurological: intubated   Psychiatric:  intubated      Lab/Data Review:     WBC 12,500    ALT/AST 55 /14  Urinalysis with WBC >100, Yeasts, Bacteria negative    Procal 0.64 <0.31 <0.26 <0.24 <0.31 <0.36  <0.59 <0.57  CRP 13.60 < 14.60 <11.40 <12.60  <18.90 <17.90    Blood cultures (5/14) Coagulase negative Staphylococci  Sacral wound (5/14) Acinetobacter baumannii sensitive to Unasyn, Cefepime, Ceftazidime and Enterococcus faecium resistant to Ampicillin  Sacral wound (5/23) Mixed skin yasmine FINAL  Left great toe (5/15) Acinetobacter baumannii  Sputum culture (5/19) Rare normal respiratory yasmine FINAL     CXR (6/6) Pulmonary vascular congestion and predominantly perihilar opacities, increased. Assessment:     Active Problems:    Sacral ulcer ([address] (5/14/2022)    1. Sacral wound infection secondary now to Acinetobacter baumannii and Enterococcus faecium, on Vancomycin and Unasyn, status post excisional wound debridement to the bone, Day #18 Vancomycin and Day #13 Unasyn. 2. Sepsis with persistent leukocytosis, elevated procal and CRP  3. Left foot wound, culture growing Acinetobacter baumannii, status post TMA  4. Possible ischemic hepatitiis with transaminitis following hypotensive episode, resolving  5. Positive blood cultures for Coagulase negative Staphylococci, probable contaminant  6. Hepatitis C antibody positive, resolved (negative HCV viral level)  7. New onset cardiomyopathy  8. Candida UTI with marked pyuria and yeasts, Day #6 IV Fluconazole    Comment:  WBC, CRP and procal remain elevated. Plan:   1. Continue IV Unasyn and Vancomycin (for E. Faecium)  2. Continue Fluconazole 200 mg IV daily for 8 more days  3.  Repeat urinalysis     Signed By: Jose Mortensen MD     June 6, 2022

## 2022-06-06 NOTE — PROGRESS NOTES
IMPRESSION:   1. Acute hypoxic respiratory failure extubated patient was put on BiPAP machine as her condition got worse  2. Pulmonary edema mild  3. Aspiration chronically  4. Sacral decubiti ulcer with Acinetobacter and Enterococcus  5. Severe sepsis with septic shock resolved on no pressors  6. Left foot wound  7. Candidal UTI  8. Transaminitis   9. Shock liver resolved  10. Hypokalemia repleted  11. Symptomatic anemia stable after transfusion  12. Cardiomyopathy ejection fraction 50 to 55%  13. Mild pulmonary edema      RECOMMENDATIONS/PLAN:   1. ICU monitoring  1. Extubated on 6/3 worsening tachypnea she was placed on noninvasive ventilator likely recurrent aspiration and fluid overload, will remove BiPAP and keep it at night and during the daytime  will start patient on nasal cannula   2. Will increase dose Lasix  3. Drop in hemoglobin we will continue to follow  4. Continue with Robinul  5. New onset cardiomyopathy with the Lasix  6. Persistent infection on Unasyn and vancomycin  7. Chest x-ray shows worsening of the congestion  8. Patient underwent on 5/23 left metatarsal amputation and sacral wound debridement  9. Severe sepsis with decubiti ulcer   10. Patient is on Unasyn  11. Chest x-ray shows mild congestive changes with fluid in the minor fissure     [x] High complexity decision making was performed  [x] See my orders for details  HPI   74-year-old lady came in because of leg pain past medical history of hypertension diabetes mellitus peripheral vascular disease CVA she is bedbound she has leg wound and also has sacral decubiti ulcer and she was scheduled for laparoscopic colostomy and sacral wound debridement and amputation of the left tarsometatarsal because of wound infection but her condition got worse her liver enzyme was elevated INR was also elevated she was intubated transferred to ICU was getting vancomycin and Unasyn started on Levophed because of low blood pressure.     PMH:  has a past medical history of Anemia, Chronic kidney disease, Diabetes mellitus ([facility] [address] Hemiplegia affecting dominant side, post-stroke ([facility] [address] (05/04/2022), HTN (hypertension), Hyperkalemia, Hyperlipidemia, Ill-defined condition, Neuropathy, PAD (peripheral artery disease) ([facility] [address] Sciatica, Stroke ([address] and UTI (urinary tract infection). PSH:   has a past surgical history that includes hx other surgical (Bilateral); hx amputation (Right); hx other surgical; hx cervical fusion; hx vascular stent (Bilateral); hx vascular stent (Bilateral); hx gi; and ir insert non tunl cvc over 5 yrs (5/27/2022). FHX: family history includes Cancer in her mother; Diabetes in her mother; Hypertension in her mother. SHX:  reports that she has never smoked. She has never used smokeless tobacco. She reports previous alcohol use. She reports that she does not use drugs.     ALL: No Known Allergies     MEDS:   [x] Reviewed - As Below   [] Not reviewed    Current Facility-Administered Medications   Medication    vancomycin (VANCOCIN) 500 mg in 0.9% sodium chloride (MBP/ADV) 100 mL MBP    [START ON 6/7/2022] Vancomycin random level to be drawn on 6/7/22 at  0400 am.    furosemide (LASIX) injection 40 mg    glycopyrrolate (ROBINUL) injection 0.1 mg    ferrous sulfate 300 mg (60 mg iron)/5 mL oral syrup 300 mg    metoprolol tartrate (LOPRESSOR) tablet 12.5 mg    alteplase (CATHFLO) injection 2 mg    fluconazole (DIFLUCAN) 200mg/100 mL IVPB (premix)    aspirin chewable tablet 81 mg    NOREPINephrine (LEVOPHED) 8 mg in 0.9% NS 250ml infusion    sodium chloride (NS) flush 5-40 mL    sodium chloride (NS) flush 5-40 mL    ondansetron (ZOFRAN) injection 4 mg    enoxaparin (LOVENOX) injection 40 mg    insulin lispro (HUMALOG) injection    glucose chewable tablet 16 g    glucagon (GLUCAGEN) injection 1 mg    hydrALAZINE (APRESOLINE) tablet 12.5 mg    sacubitriL-valsartan (ENTRESTO) 24-26 mg tablet 1 Tablet    ampicillin-sulbactam (UNASYN) 3 g in 0.9% sodium chloride (MBP/ADV) 100 mL MBP    dextrose 10% infusion 0-250 mL    insulin glargine (LANTUS) injection 10 Units    propofol (DIPRIVAN) 10 mg/mL infusion    0.9% sodium chloride infusion 250 mL    Vancomycin Pharmacy Dosing    acetaminophen (TYLENOL) tablet 650 mg    Or    acetaminophen (TYLENOL) suppository 650 mg    polyethylene glycol (MIRALAX) packet 17 g    ondansetron (ZOFRAN ODT) tablet 4 mg    famotidine (PEPCID) tablet 20 mg    acidophilus-pectin, citrus tablet 2 Tablet    albuterol-ipratropium (DUO-NEB) 2.5 MG-0.5 MG/3 ML    artificial saliva (MOUTH KOTE) 1 Spray    brimonidine (ALPHAGAN) 0.2 % ophthalmic solution 1 Drop    cholestyramine-aspartame (QUESTRAN LIGHT) packet 4 g    [Held by provider] fenofibrate nanocrystallized (TRICOR) tablet 48 mg    gabapentin (NEURONTIN) capsule 300 mg    insulin glargine (LANTUS) injection 50 Units    latanoprost (XALATAN) 0.005 % ophthalmic solution 1 Drop    traMADoL (ULTRAM) tablet 25 mg      MAR reviewed and pertinent medications noted or modified as needed   Current Facility-Administered Medications   Medication    vancomycin (VANCOCIN) 500 mg in 0.9% sodium chloride (MBP/ADV) 100 mL MBP    [START ON 6/7/2022] Vancomycin random level to be drawn on 6/7/22 at  0400 am.    furosemide (LASIX) injection 40 mg    glycopyrrolate (ROBINUL) injection 0.1 mg    ferrous sulfate 300 mg (60 mg iron)/5 mL oral syrup 300 mg    metoprolol tartrate (LOPRESSOR) tablet 12.5 mg    alteplase (CATHFLO) injection 2 mg    fluconazole (DIFLUCAN) 200mg/100 mL IVPB (premix)    aspirin chewable tablet 81 mg    NOREPINephrine (LEVOPHED) 8 mg in 0.9% NS 250ml infusion    sodium chloride (NS) flush 5-40 mL    sodium chloride (NS) flush 5-40 mL    ondansetron (ZOFRAN) injection 4 mg    enoxaparin (LOVENOX) injection 40 mg    insulin lispro (HUMALOG) injection    glucose chewable tablet 16 g    glucagon (GLUCAGEN) injection 1 mg    hydrALAZINE (APRESOLINE) tablet 12.5 mg    sacubitriL-valsartan (ENTRESTO) 24-26 mg tablet 1 Tablet    ampicillin-sulbactam (UNASYN) 3 g in 0.9% sodium chloride (MBP/ADV) 100 mL MBP    dextrose 10% infusion 0-250 mL    insulin glargine (LANTUS) injection 10 Units    propofol (DIPRIVAN) 10 mg/mL infusion    0.9% sodium chloride infusion 250 mL    Vancomycin Pharmacy Dosing    acetaminophen (TYLENOL) tablet 650 mg    Or    acetaminophen (TYLENOL) suppository 650 mg    polyethylene glycol (MIRALAX) packet 17 g    ondansetron (ZOFRAN ODT) tablet 4 mg    famotidine (PEPCID) tablet 20 mg    acidophilus-pectin, citrus tablet 2 Tablet    albuterol-ipratropium (DUO-NEB) 2.5 MG-0.5 MG/3 ML    artificial saliva (MOUTH KOTE) 1 Spray    brimonidine (ALPHAGAN) 0.2 % ophthalmic solution 1 Drop    cholestyramine-aspartame (QUESTRAN LIGHT) packet 4 g    [Held by provider] fenofibrate nanocrystallized (TRICOR) tablet 48 mg    gabapentin (NEURONTIN) capsule 300 mg    insulin glargine (LANTUS) injection 50 Units    latanoprost (XALATAN) 0.005 % ophthalmic solution 1 Drop    traMADoL (ULTRAM) tablet 25 mg      PMH:  has a past medical history of Anemia, Chronic kidney disease, Diabetes mellitus ([address] Hemiplegia affecting dominant side, post-stroke ([facility] [address] (05/04/2022), HTN (hypertension), Hyperkalemia, Hyperlipidemia, Ill-defined condition, Neuropathy, PAD (peripheral artery disease) ([address] Sciatica, Stroke ([address] and UTI (urinary tract infection). PSH:   has a past surgical history that includes hx other surgical (Bilateral); hx amputation (Right); hx other surgical; hx cervical fusion; hx vascular stent (Bilateral); hx vascular stent (Bilateral); hx gi; and ir insert non tunl cvc over 5 yrs (5/27/2022). FHX: family history includes Cancer in her mother; Diabetes in her mother; Hypertension in her mother. SHX:  reports that she has never smoked.  She has never used smokeless tobacco. She reports previous alcohol use. She reports that she does not use drugs. ROS:  Unable to obtain intubated on ventilator    Hemodynamics:    CO:    CI:    CVP:    SVR:   PAP Systolic:    PAP Diastolic:    PVR:    JM11:        Ventilator Settings:      Mode Rate TV Press PEEP FiO2 PIP Min. Vent   Pressure support,Spontaneous    500 ml 15 cm H2O  5 cm H20 30 %  27 cm H2O  15.5 l/min        Vital Signs: Telemetry:    normal sinus rhythm Intake/Output:   Visit Vitals  BP (!) 160/79 (BP 1 Location: Right upper arm, BP Patient Position: At rest)   Pulse 100   Temp (!) 101 °F (38.3 °C)   Resp 30   Ht 5' 6.93\" (1.7 m)   Wt 95.1 kg (209 lb 10.5 oz)   SpO2 99%   Breastfeeding No   BMI 32.91 kg/m²       Temp (24hrs), Av.4 °F (37.4 °C), Min:98.6 °F (37 °C), Max:101 °F (38.3 °C)        O2 Device: BIPAP O2 Flow Rate (L/min): 3 l/min       Wt Readings from Last 4 Encounters:   22 95.1 kg (209 lb 10.5 oz)   04/10/22 86.6 kg (191 lb)   22 82.2 kg (181 lb 3.5 oz)   20 84.4 kg (186 lb)          Intake/Output Summary (Last 24 hours) at 2022 0845  Last data filed at 2022 0641  Gross per 24 hour   Intake 2600 ml   Output 1575 ml   Net 1025 ml       Last shift:      No intake/output data recorded. Last 3 shifts:  1901 -  0700  In: 65 [I.V.:1055]  Out: 1 [Urine:1900; Drains:510]       Physical Exam:     General: On noninvasive ventilator weakly tries to open her eyes follows no commands  HEENT: NCAT,  Eyes: anicteric; conjunctiva clear, random eye movement when eyelids are open  Neck: no nodes, , trach midline; no accessory MM use. Neck veins obscured by obesity but seems slightly engorged  Chest: no deformity,   Cardiac: R regular; no murmur;   Lungs: Shallow breaths with diffuse wheezes and rhonchi  Abd: soft, NT, hypoactive BS  Ext: Diffuse edema; no joint swelling;  No clubbing  :clear urine  Neuro: Seems to weakly try to open her eyes follows no commands does not speak does not move her extremities  Psych-  unable to assess  Skin: warm, dry, no cyanosis;   Pulses: Brachial and radial pulses intact  Capillary: Normal capillary refill      DATA:    MAR reviewed and pertinent medications noted or modified as needed  MEDS:   Current Facility-Administered Medications   Medication    vancomycin (VANCOCIN) 500 mg in 0.9% sodium chloride (MBP/ADV) 100 mL MBP    [START ON 6/7/2022] Vancomycin random level to be drawn on 6/7/22 at  0400 am.    furosemide (LASIX) injection 40 mg    glycopyrrolate (ROBINUL) injection 0.1 mg    ferrous sulfate 300 mg (60 mg iron)/5 mL oral syrup 300 mg    metoprolol tartrate (LOPRESSOR) tablet 12.5 mg    alteplase (CATHFLO) injection 2 mg    fluconazole (DIFLUCAN) 200mg/100 mL IVPB (premix)    aspirin chewable tablet 81 mg    NOREPINephrine (LEVOPHED) 8 mg in 0.9% NS 250ml infusion    sodium chloride (NS) flush 5-40 mL    sodium chloride (NS) flush 5-40 mL    ondansetron (ZOFRAN) injection 4 mg    enoxaparin (LOVENOX) injection 40 mg    insulin lispro (HUMALOG) injection    glucose chewable tablet 16 g    glucagon (GLUCAGEN) injection 1 mg    hydrALAZINE (APRESOLINE) tablet 12.5 mg    sacubitriL-valsartan (ENTRESTO) 24-26 mg tablet 1 Tablet    ampicillin-sulbactam (UNASYN) 3 g in 0.9% sodium chloride (MBP/ADV) 100 mL MBP    dextrose 10% infusion 0-250 mL    insulin glargine (LANTUS) injection 10 Units    propofol (DIPRIVAN) 10 mg/mL infusion    0.9% sodium chloride infusion 250 mL    Vancomycin Pharmacy Dosing    acetaminophen (TYLENOL) tablet 650 mg    Or    acetaminophen (TYLENOL) suppository 650 mg    polyethylene glycol (MIRALAX) packet 17 g    ondansetron (ZOFRAN ODT) tablet 4 mg    famotidine (PEPCID) tablet 20 mg    acidophilus-pectin, citrus tablet 2 Tablet    albuterol-ipratropium (DUO-NEB) 2.5 MG-0.5 MG/3 ML    artificial saliva (MOUTH KOTE) 1 Spray    brimonidine (ALPHAGAN) 0.2 % ophthalmic solution 1 Drop    cholestyramine-aspartame (QUESTRAN LIGHT) packet 4 g    [Held by provider] fenofibrate nanocrystallized (TRICOR) tablet 48 mg    gabapentin (NEURONTIN) capsule 300 mg    insulin glargine (LANTUS) injection 50 Units    latanoprost (XALATAN) 0.005 % ophthalmic solution 1 Drop    traMADoL (ULTRAM) tablet 25 mg        Labs:    Recent Labs     22  0350 22  0300 22  0400   WBC 12.5* 15.0* 17.7*   HGB 7.9* 9.5* 10.1*    308 366     Recent Labs     22  0350 22  0300 22  0400    143 141   K 3.9 4.3 4.0   * 114* 113*   CO2 20* 22 21   * 245* 192*   BUN 37* 29* 22*   CREA 1.08* 1.01 0.87   CA 9.3 9.0 9.0   MG 2.1  --   --    PHOS 2.6  2.5*  --   --    ALB 2.7* 1.5* 1.5*   ALT 47 41 48     Recent Labs     22  0230 22  0235   PH 7.39 7.34*   PCO2 31* 36   PO2 75 60*   HCO3 20* 20*   FIO2 30.0  --     and IV IP 16 EP 5 rate 16 FiO2 30%    AC 12  PEEP 5 FiO2 30%   echo ejection fraction 20% mild mitral regurg left atrium 3.5 cm RVSP 35  , 1313, 1361    Lab Results   Component Value Date/Time    Culture result: Heavy  Mixed skin yasmine isolated   2022 06:15 PM    Culture result: Rare Normal respiratory yasmine 2022 02:48 PM    Culture result: Heavy Acinetobacter baumannii complex 05/15/2022 07:15 PM    Culture result: Heavy Diphtheroids 05/15/2022 07:15 PM     Lab Results   Component Value Date/Time    TSH 2.880 2016 10:13 AM        Imagin/22 chest x-ray with ET tube in place hazy accentuated basilar markings with small amount of fluid in the minor fissure  Results from Hospital Encounter encounter on 22    XR CHEST PORT    Narrative  Chest, frontal view, 2022    History: Pulmonary edema. Comparison: Including chest 2022. Findings: The patient is rotated to the right. Surgical hardware is seen at the  cervical spine.   Right internal jugular catheter tip is near the SVC/right  atrial junction. The cardiac silhouette is stable. Lung volumes are improved. Pulmonary vascular congestion and bilateral predominantly perihilar opacities  are increased. Bibasilar atelectasis is noted. Pleural effusions are possible. No pneumothorax is identified. The osseous structures are stable. Impression  Pulmonary vascular congestion and predominantly perihilar opacities,  increased. Results from [place] encounter on 05/14/22    CT ABD PELV WO CONT    Narrative  CT ABD PELV WO CONT    Technique: Noncontrasted CT scan of the abdomen and pelvis was performed  utilizing transaxial images obtained from the dome of the diaphragm to the pubic  symphysis. Coronal and sagittal reconstructions were generated. Dose Reduction:  All CT scans at this facility are performed using dose reduction optimization  techniques as appropriate to a performed exam including the following: Automated  exposure control, adjustments of the mA and/or kV according to patient size, or  use of iterative reconstruction technique. Comparison:  2/25/2022. Findings:  Small-moderate pleural effusions are present with bibasilar  atelectasis. Atherosclerotic calcifications are again evident including coronary  artery calcifications. Gastrostomy tube tip in the gastric antrum. The liver, spleen, pancreas, and  adrenal glands are unremarkable for noncontrasted technique. Stable lobulated  kidneys. Negative for hydronephrosis. Gallbladder is unremarkable. No biliary  duct dilatation apparent. The abdominal aorta is normal in caliber. There is no evidence of bowel obstruction. Small volume ascites has developed. The urinary bladder is decompressed by a Bell catheter. The uterus is  unremarkable. The appendix is normal.    There is mild body wall edema. No suspicious lytic or blastic lesion evident.     Impression  Third spacing of fluids with small volume ascites, small-moderate  pleural effusions, and mild body wall edema. Bibasilar atelectasis. 5/20 intubated on ventilator we will continue current vent settings patient is supposed to go for surgery because of transaminitis elevated liver enzyme we will wait as per surgeon for sacral wound debridement and diverting loop colostomy off Levophed, improvement in AST and ALT  5/21 continue with the current vent settings ABG acceptable liver enzyme improving awaiting for the surgery  5/22 maintain ventilator as is in anticipation of surgery. Renal function improved mild congestion on chest x-ray we will give 1 dose Lasix and potassium  5/23 patient is going for surgery today we will leave ventilator as it is INR is 1.5  5/25 patient remained on ventilator intubated doing well, patient received vitamin K fresh frozen plasma schedule for laparoscopic loop colostomy possible today  5/26 no surgery has been plans will start weaning do sedation vacation  5/27 try to do sedation vacation to wean patient blood pressure dropped still on Levophed we will continue to wean discussed with the family at bedside  5/28 remains on ventilator sedated and also on Levophed possible surgery next week  5/29 on ventilator ET tube advisement 1 to 1.5 cm we will repeat chest x-ray ABG acceptable  5/30 continue with current vent setting  5/31 awaiting for possible surgery if not we will start weaning  6/1 remain intubated on ventilator surgery has been delayed we will start weaning and plan for extubation  6/2 no surgery was planned so we will try to wean she is off Levophed we will do sedation vacation and if weaning cardio acceptable will extubate  6/3 change vent settings to spontaneous if weaning creatinine acceptable will extubate  6/4 extubated on 6/3 now on nasal cannula tolerating well  6/5 respiratory distress last night placed on noninvasive ventilator chest x-ray continues to show fluid overload. Will increase Lasix to 40 mg twice daily give albumin today. Likely has continued aspiration. Will increase Robinul to every 6 hours dosing  6/6 patient is alert awake he is full code we will try to wean BiPAP put on nasal cannula and keep BiPAP at night  Time of care 30 minutes

## 2022-06-06 NOTE — PROGRESS NOTES
General Daily Progress Note          Patient Name:   Lavell Wu       YOB: 1945       Age:  78 y.o. 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 Cipro    Admitted again yesterday concerning for worsening of sacral ulcer    During last admission patient was seen by infectious disease and also seen by the vascular surgeon patient had debridement of wound during the last admission    Patient seen by the infectious disease yesterday    Sacral wound infection recent cultures growing Pseudomonas resistant to Zosyn and meropenem    5/17    Patient alert awake not in distress  Patient was seen by the vascular surgeon    Vascular surgery planned for laparoscopic loop colostomy placement and sacral debridement then wound vacuum  Also try to close the wound on the left foot with TMA    5/18    Resting in the bed not in any distress  Blood sugars running high    Seen by infectious disease continue vancomycin and start on Unasyn    5/19    And went to the OR intubated because of elevated LFT and elevated INR  Surgery was canceled    On ventilator 40% O2  Propofol drip    5/20    Patient on ventilator with 30% O2  On propofol drip    Hemoglobin dropped to 6.8    Patient's daughter at the bedside    5/20    Patient on ventilator 30% O2  On propofol drip  Getting PEG tube feeding    5/21    Patient still on ventilator 30% O2  On propofol drip  Liver Function improving    5/22  On ventilator with 30% O2 on propofol drip    5/24  Awaiting tissue culture results.    On ventilator with 30% O2 on propofol drip  Left transmetatarsal amputation and Sacral wound excisional wound debridement 13 x 15 cm to the bone completed yesterday. CXR: Hazy mid and lower lung reticular markings.  Dependent small to moderate volume,  right greater than left pleural effusions  Remarkable labs   WBC: 15.2   Hgb: 8.5   Na; 148  CRP: 13.60   Procal: 0.71      5/25    Patient on ventilator with 30% O2  Seen by the vascular surgeon and plan for surgery today    5/26  Patient on ventilator with 30% O2  Hypotensive on Levophed  On propofol drip    Surgery was canceled yesterday because patient unstable hemodynamically not cleared by the anesthesia    5/27    Patient on ventilator with 30% O2  Hypotensive on Levophed  Getting PEG tube feeding    5/30     Patient on ventilator with 21% O2  On propofol drip  Off pressor    5/31    Patient on ventilator with 21% O2  On propofol drip    Off pressor  Patient have a wound vacuum      6/1    Patient scheduled for surgery today    6/2    Surgery was canceled yesterday  As overall prognosis very poor  Patient on vent on 21% O2  Getting PEG tube feeding  Wound vacuum intact      6/3    Patient still on ventilator  Seen by vascular surgeon left foot concern of ischemic changes    6/4    Patient extubated /tachypneic daughter at the bedside    6/5    Patient on BiPAP  30% O2    6/6    Patient on BiPAP 30% O2    Awake not in distress          Objective:     Visit Vitals  /67 (BP 1 Location: Right upper arm, BP Patient Position: At rest)   Pulse 98   Temp (!) 101 °F (38.3 °C)   Resp 28   Ht 5' 6.93\" (1.7 m)   Wt 95.1 kg (209 lb 10.5 oz)   SpO2 99%   Breastfeeding No   BMI 32.91 kg/m²        Recent Results (from the past 24 hour(s))   GLUCOSE, POC    Collection Time: 06/05/22 11:41 AM   Result Value Ref Range    Glucose (POC) 268 (H) 65 - 117 mg/dL    Performed by [address] [place] [address] Dr Zelaya, POC    Collection Time: 06/05/22  6:20 PM   Result Value Ref Range    Glucose (POC) 277 (H) 65 - 117 mg/dL    [facility]SE, POC    Collection Time: 06/05/22  6:27 PM   Result Value Ref Range    Glucose (POC) 283 (H) 65 - 117 mg/dL    Performed by Deacon Baez [address], POC    Collection Time: 06/05/22 11:11 PM   Result Value Ref Range    Glucose (POC) 235 (H) 65 - 117 mg/dL    Performed by [facility]    BLOOD GAS, ARTERIAL    Collection Time: 06/06/22  2:30 AM   Result Value Ref Range    pH 7.39 7.35 - 7.45      PCO2 31 (L) 35 - 45 mmHg    PO2 75 75 - 100 mmHg    O2 SAT 97 >95 %    BICARBONATE 20 (L) 22 - 26 mmol/L    BASE DEFICIT 5.5 (H) 0 - 2 mmol/L    O2 METHOD BiPAP      FIO2 30.0 %    SET RATE 16      EPAP/CPAP/PEEP 5      High PEEP 16      SITE Right Radial      EVELIN'S TEST PASS     PHOSPHORUS    Collection Time: 06/06/22  3:50 AM   Result Value Ref Range    Phosphorus 2.5 (L) 2.6 - 4.7 mg/dL   C REACTIVE PROTEIN, QT    Collection Time: 06/06/22  3:50 AM   Result Value Ref Range    C-Reactive protein 13.60 (H) 0.00 - 0.60 mg/dL   PROCALCITONIN    Collection Time: 06/06/22  3:50 AM   Result Value Ref Range    Procalcitonin 0.64 (H) 0 ng/mL   VANCOMYCIN, RANDOM    Collection Time: 06/06/22  3:50 AM   Result Value Ref Range    Vancomycin, random 60.3 ug/mL   METABOLIC PANEL, COMPREHENSIVE    Collection Time: 06/06/22  3:50 AM   Result Value Ref Range    Sodium 145 136 - 145 mmol/L    Potassium 3.9 3.5 - 5.1 mmol/L    Chloride 114 (H) 97 - 108 mmol/L    CO2 20 (L) 21 - 32 mmol/L    Anion gap 11 5 - 15 mmol/L    Glucose 229 (H) 65 - 100 mg/dL    BUN 37 (H) 6 - 20 mg/dL    Creatinine 1.08 (H) 0.55 - 1.02 mg/dL    BUN/Creatinine ratio 34 (H) 12 - 20      GFR est AA 60 (L) >60 ml/min/1.73m2    GFR est non-AA 49 (L) >60 ml/min/1.73m2    Calcium 9.3 8.5 - 10.1 mg/dL    Bilirubin, total 0.6 0.2 - 1.0 mg/dL    AST (SGOT) 39 (H) 15 - 37 U/L    ALT (SGPT) 47 12 - 78 U/L    Alk.  phosphatase 129 (H) 45 - 117 U/L    Protein, total 7.8 6.4 - 8.2 g/dL    Albumin 2.7 (L) 3.5 - 5.0 g/dL    Globulin 5.1 (H) 2.0 - 4.0 g/dL    A-G Ratio 0.5 (L) 1.1 - 2.2     CBC WITH AUTOMATED DIFF    Collection Time: 06/06/22  3:50 AM   Result Value Ref Range    WBC 12.5 (H) 3.6 - 11.0 K/uL    RBC 2.75 (L) 3.80 - 5.20 M/uL    HGB 7.9 (L) 11.5 - 16.0 g/dL    HCT 25.1 (L) 35.0 - 47.0 %    MCV 91.3 80.0 - 99.0 FL    MCH 28.7 26.0 - 34.0 PG    MCHC 31.5 30.0 - 36.5 g/dL    RDW 17.4 (H) 11.5 - 14.5 %    PLATELET 905 170 - 281 K/uL    MPV 11.4 8.9 - 12.9 FL    NRBC 0.3 (H) 0.0  WBC    ABSOLUTE NRBC 0.04 (H) 0.00 - 0.01 K/uL    NEUTROPHILS 76 (H) 32 - 75 %    LYMPHOCYTES 15 12 - 49 %    MONOCYTES 6 5 - 13 %    EOSINOPHILS 1 0 - 7 %    BASOPHILS 1 0 - 1 %    IMMATURE GRANULOCYTES 1 (H) 0 - 0.5 %    ABS. NEUTROPHILS 9.6 (H) 1.8 - 8.0 K/UL    ABS. LYMPHOCYTES 1.8 0.8 - 3.5 K/UL    ABS. MONOCYTES 0.7 0.0 - 1.0 K/UL    ABS. EOSINOPHILS 0.2 0.0 - 0.4 K/UL    ABS. BASOPHILS 0.1 0.0 - 0.1 K/UL    ABS. IMM. GRANS. 0.1 (H) 0.00 - 0.04 K/UL    DF AUTOMATED     MAGNESIUM    Collection Time: 06/06/22  3:50 AM   Result Value Ref Range    Magnesium 2.1 1.6 - 2.4 mg/dL   PHOSPHORUS    Collection Time: 06/06/22  3:50 AM   Result Value Ref Range    Phosphorus 2.6 2.6 - 4.7 mg/dL   GLUCOSE, POC    Collection Time: 06/06/22  6:32 AM   Result Value Ref Range    Glucose (POC) 223 (H) 65 - 117 mg/dL    Performed by [id number] Negro, POC    Collection Time: 06/06/22  7:09 AM   Result Value Ref Range    Glucose (POC) 214 (H) 65 - 117 mg/dL    Performed by [facility]      [unfilled]      Review of Systems    Not a good historian e. Physical Exam:      Constitutional: On ventilator  HENT:   Head: Normocephalic and atraumatic. Eyes: Pupils are equal, round, and reactive to light. EOM are normal.   Cardiovascular: Normal rate, regular rhythm and normal heart sounds. Pulmonary/Chest: Breath sounds normal. No wheezes. No rales. Exhibits no tenderness. Abdominal: Soft. Bowel sounds are normal. There is no abdominal tenderness.  There is no rebound and no guarding. Musculoskeletal: Normal range of motion. Neurological: On ventilator   skin sacral decubitus ulcer and left foot wound right big toe amputation    XR CHEST PORT   Final Result   Pulmonary vascular congestion and predominantly perihilar opacities,   increased. XR CHEST PORT   Final Result   Worsening lung aeration. XR CHEST PORT   Final Result   No significant change. XR CHEST PORT   Final Result   No significant change. XR CHEST PORT   Final Result      XR CHEST PORT   Final Result      XR CHEST PORT   Final Result   No interval change or acute process. XR CHEST PORT   Final Result   ET tube retracted from previous, with tip now at the level of the   thoracic inlet, 8-9 cm above the jennifer. Stable appearance of right central   line. Stable mild enlargement of cardiopericardial silhouette. No evidence of   pulmonary edema, air space pneumonia, or pleural effusion. No evidence of   pneumothorax. Some structures are partially obscured by overlying monitoring   electrodes. XR CHEST PORT   Final Result   Basilar airspace disease, possibly subsegmental atelectasis, stable. XR CHEST PORT   Final Result   Cardiomegaly with vascular congestion. Stable appearance of ET tube. Right central line placed, without radiographic evidence of complication. IR INSERT NON TUNL CVC OVER 5 YRS   Final Result   Successful placement of a triple-lumen central venous catheter. The   catheter is ready for use. XR CHEST PORT   Final Result      XR CHEST PORT   Final Result      XR CHEST PORT   Final Result      CT ABD PELV WO CONT   Final Result   Third spacing of fluids with small volume ascites, small-moderate   pleural effusions, and mild body wall edema. Bibasilar atelectasis. XR CHEST PORT   Final Result   Similar findings compared to single view chest 1 day earlier. XR CHEST PORT   Final Result   Findings/IMPRESSION: Stable appearance of endotracheal tube.  Stable mild enlargement of cardiomediastinal silhouette. Central vascular congestion with   bilateral perihilar and basilar opacities, consistent with edema and/or   pneumonia, right greater than left. Possible right pleural effusion. No   pneumothorax. XR CHEST PORT   Final Result   Bibasilar opacities, possibly increased on the right. XR CHEST PORT   Final Result   No significant change allowing for difference in technique and   positioning. Single portable AP view compared to yesterday. Suboptimal inspiratory film   compromises visualization of the lower lung fields. Monitoring equipment   overlies the body. The tip of the tube is approximately 3 cm above the jennifer. Mild apparent cardiomegaly. Left heart border and left diaphragm obscured. Hazy airspace opacification both lung bases may be a combination of atelectasis,   pneumonia, or edema. No large effusion. Negative for pneumothorax. XR CHEST PORT   Final Result   No significant change allowing for difference in technique and   positioning. Single portable AP view compared to yesterday. Rotation to the right. Monitoring   equipment overlies the body. Suboptimal inspiratory film compromises   visualization of the lower lung fields. Mild cardiomegaly. The tip of the ET tube is approximately 3 cm above the jennifer. Mild basal interstitial edema. No new consolidation. No pleural effusion or   pneumothorax. XR CHEST PORT   Final Result   1. The endotracheal tube is in satisfactory position. 2.  There has been a partial interval resolution in the pulmonary interstitial   edema pattern. XR CHEST PORT   Final Result   Ill-defined haziness in the mid to lower lung zones suspect for mild   atelectatic changes and/or interstitial fluid or pleural fluid. US ABD LTD   Final Result   1. Question pericholecystic fluid. No identified gallstones or sludge. 2. Right pleural effusion.    3. Increased right renal echotexture for medical renal disease. XR CHEST PORT   Final Result   Intubation. Findings suggesting hydrostatic edema. XR CHEST PORT   Final Result   No evidence of an acute cardiopulmonary process.       IR FLUORO GUIDE PLC CVAD    (Results Pending)   IR US GUIDED VASCULAR ACCESS    (Results Pending)   XR CHEST PORT    (Results Pending)        Recent Results (from the past 24 hour(s))   GLUCOSE, POC    Collection Time: 06/05/22 11:41 AM   Result Value Ref Range    Glucose (POC) 268 (H) 65 - 117 mg/dL    Performed by Sharri Nicholas    GLUCOSE, POC    Collection Time: 06/05/22  6:20 PM   Result Value Ref Range    Glucose (POC) 277 (H) 65 - 117 mg/dL    Performed by Sharri Nicholas    GLUCOSE, POC    Collection Time: 06/05/22  6:27 PM   Result Value Ref Range    Glucose (POC) 283 (H) 65 - 117 mg/dL    Performed by Deacon Baez [address], POC    Collection Time: 06/05/22 11:11 PM   Result Value Ref Range    Glucose (POC) 235 (H) 65 - 117 mg/dL    Performed by [facility]    BLOOD GAS, ARTERIAL    Collection Time: 06/06/22  2:30 AM   Result Value Ref Range    pH 7.39 7.35 - 7.45      PCO2 31 (L) 35 - 45 mmHg    PO2 75 75 - 100 mmHg    O2 SAT 97 >95 %    BICARBONATE 20 (L) 22 - 26 mmol/L    BASE DEFICIT 5.5 (H) 0 - 2 mmol/L    O2 METHOD BiPAP      FIO2 30.0 %    SET RATE 16      EPAP/CPAP/PEEP 5      High PEEP 16      SITE Right Radial      EVELIN'S TEST PASS     PHOSPHORUS    Collection Time: 06/06/22  3:50 AM   Result Value Ref Range    Phosphorus 2.5 (L) 2.6 - 4.7 mg/dL   C REACTIVE PROTEIN, QT    Collection Time: 06/06/22  3:50 AM   Result Value Ref Range    C-Reactive protein 13.60 (H) 0.00 - 0.60 mg/dL   PROCALCITONIN    Collection Time: 06/06/22  3:50 AM   Result Value Ref Range    Procalcitonin 0.64 (H) 0 ng/mL   VANCOMYCIN, RANDOM    Collection Time: 06/06/22  3:50 AM   Result Value Ref Range    Vancomycin, random 44.3 ug/mL   METABOLIC PANEL, COMPREHENSIVE    Collection Time: 06/06/22  3:50 AM   Result Value Ref Range    Sodium 145 136 - 145 mmol/L    Potassium 3.9 3.5 - 5.1 mmol/L    Chloride 114 (H) 97 - 108 mmol/L    CO2 20 (L) 21 - 32 mmol/L    Anion gap 11 5 - 15 mmol/L    Glucose 229 (H) 65 - 100 mg/dL    BUN 37 (H) 6 - 20 mg/dL    Creatinine 1.08 (H) 0.55 - 1.02 mg/dL    BUN/Creatinine ratio 34 (H) 12 - 20      GFR est AA 60 (L) >60 ml/min/1.73m2    GFR est non-AA 49 (L) >60 ml/min/1.73m2    Calcium 9.3 8.5 - 10.1 mg/dL    Bilirubin, total 0.6 0.2 - 1.0 mg/dL    AST (SGOT) 39 (H) 15 - 37 U/L    ALT (SGPT) 47 12 - 78 U/L    Alk. phosphatase 129 (H) 45 - 117 U/L    Protein, total 7.8 6.4 - 8.2 g/dL    Albumin 2.7 (L) 3.5 - 5.0 g/dL    Globulin 5.1 (H) 2.0 - 4.0 g/dL    A-G Ratio 0.5 (L) 1.1 - 2.2     CBC WITH AUTOMATED DIFF    Collection Time: 06/06/22  3:50 AM   Result Value Ref Range    WBC 12.5 (H) 3.6 - 11.0 K/uL    RBC 2.75 (L) 3.80 - 5.20 M/uL    HGB 7.9 (L) 11.5 - 16.0 g/dL    HCT 25.1 (L) 35.0 - 47.0 %    MCV 91.3 80.0 - 99.0 FL    MCH 28.7 26.0 - 34.0 PG    MCHC 31.5 30.0 - 36.5 g/dL    RDW 17.4 (H) 11.5 - 14.5 %    PLATELET 846 338 - 494 K/uL    MPV 11.4 8.9 - 12.9 FL    NRBC 0.3 (H) 0.0  WBC    ABSOLUTE NRBC 0.04 (H) 0.00 - 0.01 K/uL    NEUTROPHILS 76 (H) 32 - 75 %    LYMPHOCYTES 15 12 - 49 %    MONOCYTES 6 5 - 13 %    EOSINOPHILS 1 0 - 7 %    BASOPHILS 1 0 - 1 %    IMMATURE GRANULOCYTES 1 (H) 0 - 0.5 %    ABS. NEUTROPHILS 9.6 (H) 1.8 - 8.0 K/UL    ABS. LYMPHOCYTES 1.8 0.8 - 3.5 K/UL    ABS. MONOCYTES 0.7 0.0 - 1.0 K/UL    ABS. EOSINOPHILS 0.2 0.0 - 0.4 K/UL    ABS. BASOPHILS 0.1 0.0 - 0.1 K/UL    ABS. IMM.  GRANS. 0.1 (H) 0.00 - 0.04 K/UL    DF AUTOMATED     MAGNESIUM    Collection Time: 06/06/22  3:50 AM   Result Value Ref Range    Magnesium 2.1 1.6 - 2.4 mg/dL   PHOSPHORUS    Collection Time: 06/06/22  3:50 AM   Result Value Ref Range    Phosphorus 2.6 2.6 - 4.7 mg/dL   GLUCOSE, POC    Collection Time: 06/06/22  6:32 AM   Result Value Ref Range    Glucose (POC) 223 (H) 65 - 117 mg/dL Performed by Juwan Sierra    GLUCOSE, POC    Collection Time: 06/06/22  7:09 AM   Result Value Ref Range    Glucose (POC) 214 (H) 65 - 117 mg/dL    Performed by [facility]        Results     Procedure Component Value Units Date/Time    CULTURE, Essie Dwayne STAIN [[id number]] Collected: 06/01/22 1900    Order Status: Canceled Specimen: Wound from Foot     CULTURE, Essie Dwayne STAIN [[id number]] Collected: 06/01/22 1845    Order Status: Canceled Specimen: Wound from PEG Site     CULTURE, Essie Dwayne STAIN [[id number]] Collected: 05/30/22 1800    Order Status: Canceled Specimen: Sacral Wound     CULTURE, TISSUE Buelah Trinity STAIN [[id number]] Collected: 05/23/22 1815    Order Status: Completed Specimen: Tissue Updated: 05/26/22 1510     Special Requests: No Special Requests        GRAM STAIN Rare WBCs seen         Few Gram Negative Rods        Culture result: Heavy  Mixed skin yasmine isolated              Labs:     Recent Labs     06/06/22  0350 06/05/22  0300   WBC 12.5* 15.0*   HGB 7.9* 9.5*   HCT 25.1* 29.7*    308     Recent Labs     06/06/22  0350 06/05/22  0300 06/04/22  0400    143 141   K 3.9 4.3 4.0   * 114* 113*   CO2 20* 22 21   BUN 37* 29* 22*   CREA 1.08* 1.01 0.87   * 245* 192*   CA 9.3 9.0 9.0   MG 2.1  --   --    PHOS 2.6  2.5*  --   --      Recent Labs     06/06/22  0350 06/05/22  0300 06/04/22  0400   ALT 47 41 48   * 134* 130*   TBILI 0.6 0.5 0.5   TP 7.8 7.6 7.5   ALB 2.7* 1.5* 1.5*   GLOB 5.1* 6.1* 6.0*     No results for input(s): INR, PTP, APTT, INREXT, INREXT in the last 72 hours. No results for input(s): FE, TIBC, PSAT, FERR in the last 72 hours. No results found for: FOL, RBCF   Recent Labs     06/06/22  0230 06/05/22  0235   PH 7.39 7.34*   PCO2 31* 36   PO2 75 60*     No results for input(s): CPK, CKNDX, TROIQ in the last 72 hours.     No lab exists for component: CPKMB  Lab Results   Component Value Date/Time    Cholesterol, total 124 03/08/2022 07:40 AM    HDL Cholesterol 30 03/08/2022 07:40 AM    LDL,Direct 79 06/28/2021 12:00 AM    LDL, calculated 44.8 03/08/2022 07:40 AM    Triglyceride 202 (H) 05/16/2022 06:20 AM    CHOL/HDL Ratio 4.1 03/08/2022 07:40 AM     Lab Results   Component Value Date/Time    Glucose (POC) 214 (H) 06/06/2022 07:09 AM    Glucose (POC) 223 (H) 06/06/2022 06:32 AM    Glucose (POC) 235 (H) 06/05/2022 11:11 PM    Glucose (POC) 283 (H) 06/05/2022 06:27 PM    Glucose (POC) 277 (H) 06/05/2022 06:20 PM     Lab Results   Component Value Date/Time    Color Yellow/Straw 05/31/2022 05:56 AM    Appearance Turbid (A) 05/31/2022 05:56 AM    Specific gravity 1.006 05/31/2022 05:56 AM    pH (UA) 5.0 05/31/2022 05:56 AM    Protein 30 (A) 05/31/2022 05:56 AM    Glucose Negative 05/31/2022 05:56 AM    Ketone Negative 05/31/2022 05:56 AM    Bilirubin Negative 05/31/2022 05:56 AM    Urobilinogen 0.1 05/31/2022 05:56 AM    Nitrites Negative 05/31/2022 05:56 AM    Leukocyte Esterase Large (A) 05/31/2022 05:56 AM    Bacteria Negative 05/31/2022 05:56 AM    WBC >100 (H) 05/31/2022 05:56 AM    RBC  05/31/2022 05:56 AM         Assessment:   Acute respiratory failure extubated   sacral decubitus ulcer postdebridement  Sepsis with leukocytosis elevated procalcitonin and CRP  Left foot wound  Recent removal of left great toe and second toe  CVA with PEG tube placement  History of aspiration pneumonia  History of chronic kidney disease  CAD with ejection fraction about 20 to 25%  Hypertension  Hyperlipidemia  Anemia of chronic disease  Hyperkalemia  Static post transfusion  Uncontrolled diabetes  Hepatic shock/improving  Coagulopathy  Elevated  Troponin  Bacteremia with coagulase-negative Staphylococcus  Procedure:  1. Left transmetatarsal amputation. 2.  Sacral wound excisional wound debridement 13 x 15 cm to the bone.     Hypotension on Levophed  Hypokalemia/replace potassium  Severe protein calorie malnutrition  Plan:       Unasyn 3 g IV every 8 hours  Aspirin 81 mg daily  Questran 4 g twice a day  Lovenox 40 subcu daily  Pepcid 20 bid twice a day  Ferrous sulfate 300 mg twice a day  Flucanazole 200 mg daily  Lasix 40 mg every 12 hours  Gabapentin 200 mg twice a day  Robinul 0.1 mg 3 times a day  Hydralazine 12.5 mg 3 times a day  Lantus 10 units subcu at bedtime  Lantus 50 units subcu in the morning  Lopressor 12.5 mg twice a day  Entresto 24/26 1 tab twice daily  Vancomycin with pharmacy protocol  Repeat  the labs overall prognosis very poor    Detailed discussion with the patient's daughter about poor prognosis   At bedside  laparoscopic loop colostomy for fecal diversion canceled    Monitor H&H    Continue current medications    Discussed with ICU nurse           Current Facility-Administered Medications:     vancomycin (VANCOCIN) 500 mg in 0.9% sodium chloride (MBP/ADV) 100 mL MBP, 500 mg, IntraVENous, ONCE, Raissa Dutta MD, Last Rate: 100 mL/hr at 06/06/22 0849, 500 mg at 06/06/22 0849    [START ON 6/7/2022] Vancomycin random level to be drawn on 6/7/22 at  0400 am., , Other, ONCE, Raissa Dutta MD    furosemide (LASIX) injection 40 mg, 40 mg, IntraVENous, Q12H, Dilcia Salomon MD, 40 mg at 06/06/22 0850    glycopyrrolate (ROBINUL) injection 0.1 mg, 0.1 mg, IntraVENous, Q6H, Dilcia Salomon MD, 0.1 mg at 06/06/22 7815    ferrous sulfate 300 mg (60 mg iron)/5 mL oral syrup 300 mg, 300 mg, Per NG tube, BID WITH MEALS, Israel Soto MD, 300 mg at 06/06/22 0849    metoprolol tartrate (LOPRESSOR) tablet 12.5 mg, 12.5 mg, Per NG tube, BID, Israel Soto MD, 12.5 mg at 06/06/22 0851    alteplase (CATHFLO) injection 2 mg, 2 mg, InterCATHeter, PRN, Israel Soto MD, 2 mg at 06/03/22 1708    fluconazole (DIFLUCAN) 200mg/100 mL IVPB (premix), 200 mg, IntraVENous, Q24H, Raissa Dutta MD, Last Rate: 100 mL/hr at 06/06/22 0850, 200 mg at 06/06/22 0850    aspirin chewable tablet 81 mg, 81 mg, Per Dian Ledezma, DAILY, Dusty Garcia MD, 81 mg at 06/06/22 0851    NOREPINephrine (LEVOPHED) 8 mg in 0.9% NS 250ml infusion, 0.5-16 mcg/min, IntraVENous, TITRATE, Israel Soto MD, Paused at 05/29/22 2046    sodium chloride (NS) flush 5-40 mL, 5-40 mL, IntraVENous, Q8H, Mynor Matthew MD, 10 mL at 06/06/22 0618    sodium chloride (NS) flush 5-40 mL, 5-40 mL, IntraVENous, PRN, Michael Tobin MD, 10 mL at 05/31/22 2213    [DISCONTINUED] ondansetron (ZOFRAN ODT) tablet 4 mg, 4 mg, Oral, Q8H PRN **OR** ondansetron (ZOFRAN) injection 4 mg, 4 mg, IntraVENous, Q6H PRN, Jeb Bob MD    enoxaparin (LOVENOX) injection 40 mg, 40 mg, SubCUTAneous, DAILY, Jeb Bob MD, 40 mg at 06/06/22 0851    insulin lispro (HUMALOG) injection, , SubCUTAneous, Q6H, Jeb Bob MD, 2 Units at 06/06/22 1770    glucose chewable tablet 16 g, 4 Tablet, Oral, PRN, Jeb Bob MD    glucagon [facility]) injection 1 mg, 1 mg, IntraMUSCular, PRN, Jeb Bob MD    hydrALAZINE (APRESOLINE) tablet 12.5 mg, 12.5 mg, Oral, TID, Jeb Bob MD, 12.5 mg at 06/06/22 0851    sacubitriL-valsartan (ENTRESTO) 24-26 mg tablet 1 Tablet, 1 Tablet, Oral, Q12H, Jeb Bob MD, 1 Tablet at 06/06/22 0851    ampicillin-sulbactam (UNASYN) 3 g in 0.9% sodium chloride (MBP/ADV) 100 mL MBP, 3 g, IntraVENous, Q8H, Jeb Bob MD, Last Rate: 200 mL/hr at 06/06/22 0618, 3 g at 06/06/22 0618    dextrose 10% infusion 0-250 mL, 0-250 mL, IntraVENous, PRN, Jeb Bob MD, 125 mL at 05/19/22 0537    insulin glargine (LANTUS) injection 10 Units, 10 Units, SubCUTAneous, QHS, Jeb Bob MD, 10 Units at 06/05/22 2316    propofol (DIPRIVAN) 10 mg/mL infusion, 0-50 mcg/kg/min, IntraVENous, TITRATE, Jeb Bob MD, Last Rate: 9.5 mL/hr at 06/03/22 0051, 20 mcg/kg/min at 06/03/22 0051    0.9% sodium chloride infusion 250 mL, 250 mL, IntraVENous, PRN, Jeb Bob MD, Last Rate: 15 mL/hr at 05/22/22 0820, Rate Verify at 05/22/22 0820    Vancomycin Pharmacy Dosing, , Other, Rx Dosing/Monitoring, Mary Bob MD    acetaminophen (TYLENOL) tablet 650 mg, 650 mg, Oral, Q6H PRN, 650 mg at 05/22/22 0556 **OR** acetaminophen (TYLENOL) suppository 650 mg, 650 mg, Rectal, Q6H PRN, Mary Bob MD, 650 mg at 05/16/22 2223    polyethylene glycol (MIRALAX) packet 17 g, 17 g, Oral, DAILY PRN, Mary Bob MD    ondansetron (ZOFRAN ODT) tablet 4 mg, 4 mg, Oral, Q8H PRN **OR** [DISCONTINUED] ondansetron (ZOFRAN) injection 4 mg, 4 mg, IntraVENous, Q6H PRN, Mary Bob MD    famotidine (PEPCID) tablet 20 mg, 20 mg, Oral, BID, Mary Bob MD, 20 mg at 06/06/22 3704    acidophilus-pectin, citrus tablet 2 Tablet, 2 Tablet, Oral, DAILY, Mary Bob MD, 2 Tablet at 06/06/22 0851    albuterol-ipratropium (DUO-NEB) 2.5 MG-0.5 MG/3 ML, 3 mL, Nebulization, Q6H PRN, Mary Bob MD    artificial saliva (MOUTH KOTE) 1 Spray, 1 Spray, Oral, TID, Mary Bob MD, 1 Spray at 06/06/22 0849    brimonidine (ALPHAGAN) 0.2 % ophthalmic solution 1 Drop, 1 Drop, Both Eyes, TID, Mary Bob MD, 1 Drop at 06/06/22 0852    cholestyramine-aspartame (QUESTRAN LIGHT) packet 4 g, 4 g, Oral, BID WITH MEALS, Mary Bob MD, 4 g at 06/06/22 0850    [Held by provider] fenofibrate nanocrystallized (TRICOR) tablet 48 mg, 48 mg, Oral, DAILY, Mary Bob MD, 48 mg at 05/19/22 0643    gabapentin (NEURONTIN) capsule 300 mg, 300 mg, Oral, BID, Mary Bob MD, 300 mg at 06/06/22 0851    insulin glargine (LANTUS) injection 50 Units, 50 Units, SubCUTAneous, DAILY, Mary Bob MD, 50 Units at 06/06/22 0849    latanoprost (XALATAN) 0.005 % ophthalmic solution 1 Drop, 1 Drop, Right Eye, QPM, Mary Bob MD, 1 Drop at 06/05/22 1712    traMADoL (ULTRAM) tablet 25 mg, 25 mg, Oral, Q12H PRN, Mary Bob MD, 25 mg at 06/03/22 1717

## 2022-06-06 NOTE — PROGRESS NOTES
Vancomycin Dosing Consult  Day #22 of vancomycin therapy  Consult ordered by Dr. Clifton Dykes for this 78 y.o. female for indication of sacral wound infection, sepsis. Antibiotic regimen: Vancomycin + Unasyn  + fluconazole    Temp (24hrs), Av.4 °F (36.9 °C), Min:97.5 °F (36.4 °C), Max:99.2 °F (37.3 °C)    Recent Labs     22  0300 22  0400 22  0454   WBC 15.0* 17.7* 12.0*   CREA 1.01 0.87 0.70   BUN 29* 22* 22*     Est CrCl: ~55-60 mL/min; UO: 0.4 mL/kg/hr  Concomitant nephrotoxic drugs: Off vasopressors    Cultures:    Wound: Moderate MDR Pseudomonas aeruginosa susceptible to Zerbaxa, aminoglycosides), moderate mixed skin yasmine, moderate mixed enteric yasmine including yeast, final   Blood: CoNS in the anaerobic bottle, final   Wound, ulcer: Moderate Acinetobacter baumannii, moderate Enterococcus faecium, light >2 organisms (contraindicated), final  5/15 Wound, great toe: Heavy Acinetobacter baumannii complex (Zosyn resistant, susceptible to Unasyn), heavy Diphtheroids, final   Tissue: Heavy mixed skin yasmine, final    MRSA Swab: Not ordered, patient already received first dose of vancomycin    Target range: Trough 10-15 mcg/mL    Recent level history:  Date/Time Dose & Interval Measured Level (mcg/mL)    @ 0445 750 mg x 1 19.8    @ 0321 Held 15.4    @ 0400 750 mg x 1 (given ) 13.6    @ 0300 500 mg x 1 15.3    @ 0350 500 Mg X1 at 0900 11.2             Assessment/Plan:   Afebrile, continued leukocytosis, procal remain elevated  CKD, SCr trending up , today is at  1.09, clearance of vancomycin remains quite slow relative to renal function  Random level is at  11.2 today.   Will give 500 mg  dose today and check a random level tomorrow AM 22  at  0400 am.  Antimicrobial stop date TBD

## 2022-06-06 NOTE — PROGRESS NOTES
Goals of care family meeting scheduled for June 7th, 2022 @ 0900AM.  Attending (Dr. Gumaro Canada) aware and agreeable.           Ruth Ballesteros, DOV

## 2022-06-06 NOTE — WOUND CARE
Negative Pressure    NAME:  Kati Burr  YOB: 1945  MEDICAL RECORD NUMBER:  [id number]  DATE:  5/14/2022     Applied Negative Pressure to sacral surgical incision wound(s)/ulcer(s).  [x] Applied skin barrier prep to leah-wound.  [x] Cut strips of plastic drape to picture frame wound so that leah-wound is     covered with the drape.  [x] If bridging dressing to less prominent site, cover any intact skin that will come in contact with the Negative Pressure Therapy sponge, gauze or channel drain with plastic drape. The sponge should never touch intact skin.  [x] Cut sponge, gauze or channel drain to size which will fit into the wound/ulcer bed without being forced.  [x] Be sure the sponge is large enough to hold the entire round plastic flange which is attached to the tubing. Never allow flange to be larger than the sponge or it will produce suction damaging intact skin.  Total number of individual pieces of foam used within the wound bed: 1x Contact foam;  1x cover foam.   [x] If bridging the dressing away from the primary site, be sure the bridge leads to a piece of sponge large enough to hold the entire flange without allowing any of the flange to overlap onto intact skin.  [x] Covered sponge, gauze or channel drain with plastic drape.  [x] Cut a hole in this plastic drape directly over the sponge the same size as the plastic drain tubing.  [x] Removed plastic liner from flange and apply it directly over the hole you cut.  [x] Removed the plastic cover from the flange.  [x] Attached the tubing to the wound/ulcer Negative Pressure Therapy and turn it on to be sure a vacuum is created and that there are no leaks.  [x] If air leaks occur, use plastic drape to patch them.  [] Secured Negative Pressure Therapy dressing with ace wrap loosely if located on an extremity. Maintain tubing outside of ace wrap. Tubing must not exert pressure on intact skin.     Applied per  Guidelines    Nurse called [facility] to advise that leak occurred over the weekend and they were unable to fix it, wound was packed with dakins wet to dry dressing. Wound dressing removed and wound irrigated with NS. Applied stoma paste and stoma strip paste above anus, to ensure good seal is acheived. Applied new wound vac dressing. Once seal achieved, covered inferior aspect of drape with hydrocolloid to protect from stool. Next dressing change will be for Thursday 6/9/22 by [facility].      If wound vac is not working properly: 1) Check to see if track pad tubing is clogged. If so, remove track pad, apply film over exposed foam, cut dime size hole in film, and apply a new track pad. 2) If film is leaking, find area with leak and reinforce with film. 3) If leak cannot be fixed, remove dressing, apply wet/dry dressing, and inform the [facility].

## 2022-06-07 NOTE — PROGRESS NOTES
General Daily Progress Note          Patient Name:   Rossy Ramires       YOB: 1945       Age:  78 y.o. 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 Cipro    Admitted again yesterday concerning for worsening of sacral ulcer    During last admission patient was seen by infectious disease and also seen by the vascular surgeon patient had debridement of wound during the last admission    Patient seen by the infectious disease yesterday    Sacral wound infection recent cultures growing Pseudomonas resistant to Zosyn and meropenem    5/17    Patient alert awake not in distress  Patient was seen by the vascular surgeon    Vascular surgery planned for laparoscopic loop colostomy placement and sacral debridement then wound vacuum  Also try to close the wound on the left foot with TMA    5/18    Resting in the bed not in any distress  Blood sugars running high    Seen by infectious disease continue vancomycin and start on Unasyn    5/19    And went to the OR intubated because of elevated LFT and elevated INR  Surgery was canceled    On ventilator 40% O2  Propofol drip    5/20    Patient on ventilator with 30% O2  On propofol drip    Hemoglobin dropped to 6.8    Patient's daughter at the bedside    5/20    Patient on ventilator 30% O2  On propofol drip  Getting PEG tube feeding    5/21    Patient still on ventilator 30% O2  On propofol drip  Liver Function improving    5/22  On ventilator with 30% O2 on propofol drip    5/24  Awaiting tissue culture results.    On ventilator with 30% O2 on propofol drip  Left transmetatarsal amputation and Sacral wound excisional wound debridement 13 x 15 cm to the bone completed yesterday. CXR: Hazy mid and lower lung reticular markings.  Dependent small to moderate volume,  right greater than left pleural effusions  Remarkable labs   WBC: 15.2   Hgb: 8.5   Na; 148  CRP: 13.60   Procal: 0.71      5/25    Patient on ventilator with 30% O2  Seen by the vascular surgeon and plan for surgery today    5/26  Patient on ventilator with 30% O2  Hypotensive on Levophed  On propofol drip    Surgery was canceled yesterday because patient unstable hemodynamically not cleared by the anesthesia    5/27    Patient on ventilator with 30% O2  Hypotensive on Levophed  Getting PEG tube feeding    5/30     Patient on ventilator with 21% O2  On propofol drip  Off pressor    5/31    Patient on ventilator with 21% O2  On propofol drip    Off pressor  Patient have a wound vacuum      6/1    Patient scheduled for surgery today    6/2    Surgery was canceled yesterday  As overall prognosis very poor  Patient on vent on 21% O2  Getting PEG tube feeding  Wound vacuum intact      6/3    Patient still on ventilator  Seen by vascular surgeon left foot concern of ischemic changes    6/4    Patient extubated /tachypneic daughter at the bedside    6/5    Patient on BiPAP  30% O2    6/6    Patient on BiPAP 30% O2    Awake not in distress    6/7    Still on BiPAP 30% O2  Awake          Objective:     Visit Vitals  BP (!) 132/59 (BP 1 Location: Left upper arm, BP Patient Position: At rest)   Pulse 84   Temp (!) 101 °F (38.3 °C)   Resp 24   Ht 5' 6.93\" (1.7 m)   Wt 95.1 kg (209 lb 10.5 oz)   SpO2 100%   Breastfeeding No   BMI 32.91 kg/m²        Recent Results (from the past 24 hour(s))   GLUCOSE, POC    Collection Time: 06/06/22 12:29 PM   Result Value Ref Range    Glucose (POC) 192 (H) 65 - 117 mg/dL    Performed by [facility], POC    Collection Time: 06/06/22  6:00 PM   Result Value Ref Range    Glucose (POC) 117 65 - 117 mg/dL    Performed by Misti Veronica    GLUCOSE, POC    Collection Time: 06/06/22 11:15 PM   Result Value Ref Range    Glucose (POC) 70 65 - 117 mg/dL    Performed by [address], POC    Collection Time: 06/06/22 11:17 PM   Result Value Ref Range    Glucose (POC) 67 65 - 117 mg/dL    Performed by [address] Negro, POC    Collection Time: 06/07/22 12:15 AM   Result Value Ref Range    Glucose (POC) 88 65 - 117 mg/dL    Performed by [address] Negro, POC    Collection Time: 06/07/22  3:29 AM   Result Value Ref Range    Glucose (POC) 107 65 - 117 mg/dL    Performed by Morro Barba, RANDOM    Collection Time: 06/07/22  3:30 AM   Result Value Ref Range    Vancomycin, random 13.6 ug/mL   CBC WITH AUTOMATED DIFF    Collection Time: 06/07/22  3:30 AM   Result Value Ref Range    WBC 15.0 (H) 3.6 - 11.0 K/uL    RBC 2.75 (L) 3.80 - 5.20 M/uL    HGB 7.8 (L) 11.5 - 16.0 g/dL    HCT 25.3 (L) 35.0 - 47.0 %    MCV 92.0 80.0 - 99.0 FL    MCH 28.4 26.0 - 34.0 PG    MCHC 30.8 30.0 - 36.5 g/dL    RDW 17.6 (H) 11.5 - 14.5 %    PLATELET 671 627 - 203 K/uL    MPV 10.6 8.9 - 12.9 FL    NRBC 0.5 (H) 0.0  WBC    ABSOLUTE NRBC 0.08 (H) 0.00 - 0.01 K/uL    NEUTROPHILS 81 (H) 32 - 75 %    LYMPHOCYTES 11 (L) 12 - 49 %    MONOCYTES 5 5 - 13 %    EOSINOPHILS 1 0 - 7 %    BASOPHILS 1 0 - 1 %    IMMATURE GRANULOCYTES 1 (H) 0 - 0.5 %    ABS. NEUTROPHILS 12.2 (H) 1.8 - 8.0 K/UL    ABS. LYMPHOCYTES 1.7 0.8 - 3.5 K/UL    ABS. MONOCYTES 0.8 0.0 - 1.0 K/UL    ABS. EOSINOPHILS 0.2 0.0 - 0.4 K/UL    ABS. BASOPHILS 0.1 0.0 - 0.1 K/UL    ABS. IMM.  GRANS. 0.1 (H) 0.00 - 0.04 K/UL    DF AUTOMATED     METABOLIC PANEL, COMPREHENSIVE    Collection Time: 06/07/22  3:30 AM   Result Value Ref Range    Sodium 148 (H) 136 - 145 mmol/L    Potassium 3.8 3.5 - 5.1 mmol/L    Chloride 118 (H) 97 - 108 mmol/L    CO2 22 21 - 32 mmol/L    Anion gap 8 5 - 15 mmol/L    Glucose 108 (H) 65 - 100 mg/dL    BUN 42 (H) 6 - 20 mg/dL    Creatinine 1.00 0.55 - 1.02 mg/dL    BUN/Creatinine ratio 42 (H) 12 - 20      GFR est AA >60 >60 ml/min/1.73m2    GFR est non-AA 54 (L) >60 ml/min/1.73m2    Calcium 9.1 8.5 - 10.1 mg/dL    Bilirubin, total 0.7 0.2 - 1.0 mg/dL    AST (SGOT) 71 (H) 15 - 37 U/L    ALT (SGPT) 73 12 - 78 U/L    Alk. phosphatase 137 (H) 45 - 117 U/L    Protein, total 7.3 6.4 - 8.2 g/dL    Albumin 2.4 (L) 3.5 - 5.0 g/dL    Globulin 4.9 (H) 2.0 - 4.0 g/dL    A-G Ratio 0.5 (L) 1.1 - 2.2     C REACTIVE PROTEIN, QT    Collection Time: 06/07/22  3:30 AM   Result Value Ref Range    C-Reactive protein 14.20 (H) 0.00 - 0.60 mg/dL   PROCALCITONIN    Collection Time: 06/07/22  3:30 AM   Result Value Ref Range    Procalcitonin 0.84 (H) 0 ng/mL   GLUCOSE, POC    Collection Time: 06/07/22  6:23 AM   Result Value Ref Range    Glucose (POC) 85 65 - 117 mg/dL    Performed by Krystyna Jackson      [unfilled]      Review of Systems    Not a good historian e. Physical Exam:      Constitutional: On ventilator  HENT:   Head: Normocephalic and atraumatic. Eyes: Pupils are equal, round, and reactive to light. EOM are normal.   Cardiovascular: Normal rate, regular rhythm and normal heart sounds. Pulmonary/Chest: Breath sounds normal. No wheezes. No rales. Exhibits no tenderness. Abdominal: Soft. Bowel sounds are normal. There is no abdominal tenderness. There is no rebound and no guarding. Musculoskeletal: Normal range of motion. Neurological: On ventilator   skin sacral decubitus ulcer and left foot wound right big toe amputation    XR CHEST PORT   Final Result      XR CHEST PORT   Final Result   Pulmonary vascular congestion and predominantly perihilar opacities,   increased. XR CHEST PORT   Final Result   Worsening lung aeration. XR CHEST PORT   Final Result   No significant change. XR CHEST PORT   Final Result   No significant change.       XR CHEST PORT   Final Result      XR CHEST PORT   Final Result      XR CHEST PORT   Final Result   No interval change or acute process. XR CHEST PORT   Final Result   ET tube retracted from previous, with tip now at the level of the   thoracic inlet, 8-9 cm above the jennifer. Stable appearance of right central   line. Stable mild enlargement of cardiopericardial silhouette. No evidence of   pulmonary edema, air space pneumonia, or pleural effusion. No evidence of   pneumothorax. Some structures are partially obscured by overlying monitoring   electrodes. XR CHEST PORT   Final Result   Basilar airspace disease, possibly subsegmental atelectasis, stable. XR CHEST PORT   Final Result   Cardiomegaly with vascular congestion. Stable appearance of ET tube. Right central line placed, without radiographic evidence of complication. IR INSERT NON TUNL CVC OVER 5 YRS   Final Result   Successful placement of a triple-lumen central venous catheter. The   catheter is ready for use. XR CHEST PORT   Final Result      XR CHEST PORT   Final Result      XR CHEST PORT   Final Result      CT ABD PELV WO CONT   Final Result   Third spacing of fluids with small volume ascites, small-moderate   pleural effusions, and mild body wall edema. Bibasilar atelectasis. XR CHEST PORT   Final Result   Similar findings compared to single view chest 1 day earlier. XR CHEST PORT   Final Result   Findings/IMPRESSION: Stable appearance of endotracheal tube. Stable mild   enlargement of cardiomediastinal silhouette. Central vascular congestion with   bilateral perihilar and basilar opacities, consistent with edema and/or   pneumonia, right greater than left. Possible right pleural effusion. No   pneumothorax. XR CHEST PORT   Final Result   Bibasilar opacities, possibly increased on the right. XR CHEST PORT   Final Result   No significant change allowing for difference in technique and   positioning. Single portable AP view compared to yesterday.  Suboptimal inspiratory film   compromises visualization of the lower lung fields. Monitoring equipment   overlies the body. The tip of the tube is approximately 3 cm above the jennifer. Mild apparent cardiomegaly. Left heart border and left diaphragm obscured. Hazy airspace opacification both lung bases may be a combination of atelectasis,   pneumonia, or edema. No large effusion. Negative for pneumothorax. XR CHEST PORT   Final Result   No significant change allowing for difference in technique and   positioning. Single portable AP view compared to yesterday. Rotation to the right. Monitoring   equipment overlies the body. Suboptimal inspiratory film compromises   visualization of the lower lung fields. Mild cardiomegaly. The tip of the ET tube is approximately 3 cm above the jennifer. Mild basal interstitial edema. No new consolidation. No pleural effusion or   pneumothorax. XR CHEST PORT   Final Result   1. The endotracheal tube is in satisfactory position. 2.  There has been a partial interval resolution in the pulmonary interstitial   edema pattern. XR CHEST PORT   Final Result   Ill-defined haziness in the mid to lower lung zones suspect for mild   atelectatic changes and/or interstitial fluid or pleural fluid. US ABD LTD   Final Result   1. Question pericholecystic fluid. No identified gallstones or sludge. 2. Right pleural effusion. 3. Increased right renal echotexture for medical renal disease. XR CHEST PORT   Final Result   Intubation. Findings suggesting hydrostatic edema. XR CHEST PORT   Final Result   No evidence of an acute cardiopulmonary process.       IR FLUORO GUIDE PLC CVAD    (Results Pending)   IR US GUIDED VASCULAR ACCESS    (Results Pending)        Recent Results (from the past 24 hour(s))   GLUCOSE, POC    Collection Time: 06/06/22 12:29 PM   Result Value Ref Range    Glucose (POC) 192 (H) 65 - 117 mg/dL    Performed by [facility] Collection Time: 06/06/22  6:00 PM   Result Value Ref Range    Glucose (POC) 117 65 - 117 mg/dL    Performed by [facility], POC    Collection Time: 06/06/22 11:15 PM   Result Value Ref Range    Glucose (POC) 70 65 - 117 mg/dL    Performed by [address], POC    Collection Time: 06/06/22 11:17 PM   Result Value Ref Range    Glucose (POC) 67 65 - 117 mg/dL    Performed by [address], POC    Collection Time: 06/07/22 12:15 AM   Result Value Ref Range    Glucose (POC) 88 65 - 117 mg/dL    Performed by [address], POC    Collection Time: 06/07/22  3:29 AM   Result Value Ref Range    Glucose (POC) 107 65 - 117 mg/dL    Performed by Logan Carrasco, RANDOM    Collection Time: 06/07/22  3:30 AM   Result Value Ref Range    Vancomycin, random 13.6 ug/mL   CBC WITH AUTOMATED DIFF    Collection Time: 06/07/22  3:30 AM   Result Value Ref Range    WBC 15.0 (H) 3.6 - 11.0 K/uL    RBC 2.75 (L) 3.80 - 5.20 M/uL    HGB 7.8 (L) 11.5 - 16.0 g/dL    HCT 25.3 (L) 35.0 - 47.0 %    MCV 92.0 80.0 - 99.0 FL    MCH 28.4 26.0 - 34.0 PG    MCHC 30.8 30.0 - 36.5 g/dL    RDW 17.6 (H) 11.5 - 14.5 %    PLATELET 782 894 - 495 K/uL    MPV 10.6 8.9 - 12.9 FL    NRBC 0.5 (H) 0.0  WBC    ABSOLUTE NRBC 0.08 (H) 0.00 - 0.01 K/uL    NEUTROPHILS 81 (H) 32 - 75 %    LYMPHOCYTES 11 (L) 12 - 49 %    MONOCYTES 5 5 - 13 %    EOSINOPHILS 1 0 - 7 %    BASOPHILS 1 0 - 1 %    IMMATURE GRANULOCYTES 1 (H) 0 - 0.5 %    ABS. NEUTROPHILS 12.2 (H) 1.8 - 8.0 K/UL    ABS. LYMPHOCYTES 1.7 0.8 - 3.5 K/UL    ABS. MONOCYTES 0.8 0.0 - 1.0 K/UL    ABS. EOSINOPHILS 0.2 0.0 - 0.4 K/UL    ABS. BASOPHILS 0.1 0.0 - 0.1 K/UL    ABS. IMM.  GRANS. 0.1 (H) 0.00 - 0.04 K/UL    DF AUTOMATED     METABOLIC PANEL, COMPREHENSIVE    Collection Time: 06/07/22  3:30 AM   Result Value Ref Range    Sodium 148 (H) 136 - 145 mmol/L    Potassium 3.8 3.5 - 5.1 mmol/L    Chloride 118 (H) 97 - 108 mmol/L CO2 22 21 - 32 mmol/L    Anion gap 8 5 - 15 mmol/L    Glucose 108 (H) 65 - 100 mg/dL    BUN 42 (H) 6 - 20 mg/dL    Creatinine 1.00 0.55 - 1.02 mg/dL    BUN/Creatinine ratio 42 (H) 12 - 20      GFR est AA >60 >60 ml/min/1.73m2    GFR est non-AA 54 (L) >60 ml/min/1.73m2    Calcium 9.1 8.5 - 10.1 mg/dL    Bilirubin, total 0.7 0.2 - 1.0 mg/dL    AST (SGOT) 71 (H) 15 - 37 U/L    ALT (SGPT) 73 12 - 78 U/L    Alk.  phosphatase 137 (H) 45 - 117 U/L    Protein, total 7.3 6.4 - 8.2 g/dL    Albumin 2.4 (L) 3.5 - 5.0 g/dL    Globulin 4.9 (H) 2.0 - 4.0 g/dL    A-G Ratio 0.5 (L) 1.1 - 2.2     C REACTIVE PROTEIN, QT    Collection Time: 06/07/22  3:30 AM   Result Value Ref Range    C-Reactive protein 14.20 (H) 0.00 - 0.60 mg/dL   PROCALCITONIN    Collection Time: 06/07/22  3:30 AM   Result Value Ref Range    Procalcitonin 0.84 (H) 0 ng/mL   GLUCOSE, POC    Collection Time: 06/07/22  6:23 AM   Result Value Ref Range    Glucose (POC) 85 65 - 117 mg/dL    Performed by [facility]        Results     Procedure Component Value Units Date/Time    CULTURE, Alpheus May STAIN [[id number]] Collected: 06/01/22 1900    Order Status: Canceled Specimen: Wound from Foot     CULTURE, Alpheus May STAIN [[id number]] Collected: 06/01/22 1845    Order Status: Canceled Specimen: Wound from PEG Site     CULTURE, Alpheus May STAIN [[id number]] Collected: 05/30/22 1800    Order Status: Canceled Specimen: Sacral Wound            Labs:     Recent Labs     06/07/22  0330 06/06/22  0350   WBC 15.0* 12.5*   HGB 7.8* 7.9*   HCT 25.3* 25.1*    263     Recent Labs     06/07/22  0330 06/06/22  0350 06/05/22  0300   * 145 143   K 3.8 3.9 4.3   * 114* 114*   CO2 22 20* 22   BUN 42* 37* 29*   CREA 1.00 1.08* 1.01   * 229* 245*   CA 9.1 9.3 9.0   MG  --  2.1  --    PHOS  --  2.6  2.5*  --      Recent Labs     06/07/22  0330 06/06/22  0350 06/05/22  0300   ALT 73 47 41   * 129* 134*   TBILI 0.7 0.6 0.5   TP 7.3 7.8 7.6   ALB 2. 4* 2.7* 1.5*   GLOB 4.9* 5.1* 6.1*     No results for input(s): INR, PTP, APTT, INREXT, INREXT in the last 72 hours. No results for input(s): FE, TIBC, PSAT, FERR in the last 72 hours. No results found for: FOL, RBCF   Recent Labs     06/06/22  0230 06/05/22  0235   PH 7.39 7.34*   PCO2 31* 36   PO2 75 60*     No results for input(s): CPK, CKNDX, TROIQ in the last 72 hours.     No lab exists for component: CPKMB  Lab Results   Component Value Date/Time    Cholesterol, total 124 03/08/2022 07:40 AM    HDL Cholesterol 30 03/08/2022 07:40 AM    LDL,Direct 79 06/28/2021 12:00 AM    LDL, calculated 44.8 03/08/2022 07:40 AM    Triglyceride 202 (H) 05/16/2022 06:20 AM    CHOL/HDL Ratio 4.1 03/08/2022 07:40 AM     Lab Results   Component Value Date/Time    Glucose (POC) 85 06/07/2022 06:23 AM    Glucose (POC) 107 06/07/2022 03:29 AM    Glucose (POC) 88 06/07/2022 12:15 AM    Glucose (POC) 67 06/06/2022 11:17 PM    Glucose (POC) 70 06/06/2022 11:15 PM     Lab Results   Component Value Date/Time    Color Yellow/Straw 05/31/2022 05:56 AM    Appearance Turbid (A) 05/31/2022 05:56 AM    Specific gravity 1.006 05/31/2022 05:56 AM    pH (UA) 5.0 05/31/2022 05:56 AM    Protein 30 (A) 05/31/2022 05:56 AM    Glucose Negative 05/31/2022 05:56 AM    Ketone Negative 05/31/2022 05:56 AM    Bilirubin Negative 05/31/2022 05:56 AM    Urobilinogen 0.1 05/31/2022 05:56 AM    Nitrites Negative 05/31/2022 05:56 AM    Leukocyte Esterase Large (A) 05/31/2022 05:56 AM    Bacteria Negative 05/31/2022 05:56 AM    WBC >100 (H) 05/31/2022 05:56 AM    RBC  05/31/2022 05:56 AM         Assessment:   Acute respiratory failure extubated   sacral decubitus ulcer postdebridement  Sepsis with leukocytosis elevated procalcitonin and CRP  Left foot wound  Recent removal of left great toe and second toe  CVA with PEG tube placement  History of aspiration pneumonia  History of chronic kidney disease  CAD with ejection fraction about 20 to 25%  Hypertension  Hyperlipidemia  Anemia of chronic disease  Hyperkalemia  Static post transfusion  Uncontrolled diabetes  Hepatic shock/improving  Coagulopathy  Elevated  Troponin  Bacteremia with coagulase-negative Staphylococcus  Procedure:  1. Left transmetatarsal amputation. 2.  Sacral wound excisional wound debridement 13 x 15 cm to the bone.     Hypotension on Levophed  Hypokalemia/replace potassium  Severe protein calorie malnutrition  Plan:       Unasyn 3 g IV every 8 hours  Aspirin 81 mg daily  Questran 4 g twice a day  Lovenox 40 subcu daily  Pepcid 20 bid twice a day  Ferrous sulfate 300 mg twice a day  Flucanazole 200 mg daily  Lasix 40 mg every 12 hours  Gabapentin 200 mg twice a day  Robinul 0.1 mg 3 times a day  Hydralazine 12.5 mg 3 times a day  Lantus 10 units subcu at bedtime  Lantus 50 units subcu in the morning  Lopressor 12.5 mg twice a day  Entresto 24/26 1 tab twice daily  Vancomycin with pharmacy protocol  Repeat  the labs overall prognosis very poor    Detailed discussion with the patient's daughter about poor prognosis   At bedside  laparoscopic loop colostomy for fecal diversion canceled    Monitor H&H    Continue current medications    Discussed with ICU nurse      Had family meeting with patient's daughter and nephew and ICU  nursing director    Discussed about comfort care and hospice/waiting for decision     Current Facility-Administered Medications:     furosemide (LASIX) injection 40 mg, 40 mg, IntraVENous, Q12H, Real Horan MD, 40 mg at 06/07/22 0844    glycopyrrolate (ROBINUL) injection 0.1 mg, 0.1 mg, IntraVENous, Q6H, Real Horan MD, 0.1 mg at 06/07/22 0844    ferrous sulfate 300 mg (60 mg iron)/5 mL oral syrup 300 mg, 300 mg, Per NG tube, BID WITH MEALS, Israel Soto MD, 300 mg at 06/07/22 0844    metoprolol tartrate (LOPRESSOR) tablet 12.5 mg, 12.5 mg, Per NG tube, BID, Israel Soto MD, 12.5 mg at 06/07/22 0846    alteplase (CATHFLO) injection 2 mg, 2 mg, InterCATHeter, PRN, Claudette Anis, MD, 2 mg at 06/03/22 1708    fluconazole (DIFLUCAN) 200mg/100 mL IVPB (premix), 200 mg, IntraVENous, Q24H, Monika Mullen MD, Last Rate: 100 mL/hr at 06/07/22 0843, 200 mg at 06/07/22 0843    aspirin chewable tablet 81 mg, 81 mg, Per G Tube, DAILY, Israel Soto MD, 81 mg at 06/07/22 0845    NOREPINephrine (LEVOPHED) 8 mg in 0.9% NS 250ml infusion, 0.5-16 mcg/min, IntraVENous, TITRATE, Israel Soto MD, Paused at 05/29/22 2046    sodium chloride (NS) flush 5-40 mL, 5-40 mL, IntraVENous, Q8H, Mynor Matthew MD, 10 mL at 06/07/22 0627    sodium chloride (NS) flush 5-40 mL, 5-40 mL, IntraVENous, PRN, Yaneth Paul MD, 10 mL at 05/31/22 2213    [DISCONTINUED] ondansetron (ZOFRAN ODT) tablet 4 mg, 4 mg, Oral, Q8H PRN **OR** ondansetron (ZOFRAN) injection 4 mg, 4 mg, IntraVENous, Q6H PRN, Victoria Bob MD    enoxaparin (LOVENOX) injection 40 mg, 40 mg, SubCUTAneous, DAILY, Victoria Bob MD, 40 mg at 06/07/22 0843    insulin lispro (HUMALOG) injection, , SubCUTAneous, Q6H, Victoria Bob MD, 1 Units at 06/06/22 1253    glucose chewable tablet 16 g, 4 Tablet, Oral, PRN, Victoria Bob MD    glucagon [facility]) injection 1 mg, 1 mg, IntraMUSCular, PRN, Victoria Bob MD    hydrALAZINE (APRESOLINE) tablet 12.5 mg, 12.5 mg, Oral, TID, Victoria Bob MD, 12.5 mg at 06/07/22 0845    sacubitriL-valsartan (ENTRESTO) 24-26 mg tablet 1 Tablet, 1 Tablet, Oral, Q12H, Victoria Bob MD, 1 Tablet at 06/07/22 0845    ampicillin-sulbactam (UNASYN) 3 g in 0.9% sodium chloride (MBP/ADV) 100 mL MBP, 3 g, IntraVENous, Q8H, Victoria Bob MD, Last Rate: 200 mL/hr at 06/07/22 0625, 3 g at 06/07/22 0625    dextrose 10% infusion 0-250 mL, 0-250 mL, IntraVENous, PRN, Victoria Bob MD, 125 mL at 05/19/22 0537    insulin glargine (LANTUS) injection 10 Units, 10 Units, SubCUTAneous, QHS, Victoria Bob MD, 10 Units at 06/05/22 2316    propofol (DIPRIVAN) 10 mg/mL infusion, 0-50 mcg/kg/min, IntraVENous, TITRATE, Camryn Bob MD, Last Rate: 9.5 mL/hr at 06/03/22 0051, 20 mcg/kg/min at 06/03/22 0051    0.9% sodium chloride infusion 250 mL, 250 mL, IntraVENous, PRN, Camryn Bob MD, Last Rate: 15 mL/hr at 05/22/22 0820, Rate Verify at 05/22/22 0820    Vancomycin Pharmacy Dosing, , Other, Rx Dosing/Monitoring, Camryn Bob MD    acetaminophen (TYLENOL) tablet 650 mg, 650 mg, Oral, Q6H PRN, 650 mg at 05/22/22 0556 **OR** acetaminophen (TYLENOL) suppository 650 mg, 650 mg, Rectal, Q6H PRN, Camryn Bob MD, 650 mg at 05/16/22 2223    polyethylene glycol (MIRALAX) packet 17 g, 17 g, Oral, DAILY PRN, Camryn Bob MD    ondansetron (ZOFRAN ODT) tablet 4 mg, 4 mg, Oral, Q8H PRN **OR** [DISCONTINUED] ondansetron (ZOFRAN) injection 4 mg, 4 mg, IntraVENous, Q6H PRN, Camryn Bob MD    famotidine (PEPCID) tablet 20 mg, 20 mg, Oral, BID, Camryn Bob MD, 20 mg at 06/07/22 0845    acidophilus-pectin, citrus tablet 2 Tablet, 2 Tablet, Oral, DAILY, Camryn Bob MD, 2 Tablet at 06/07/22 0845    albuterol-ipratropium (DUO-NEB) 2.5 MG-0.5 MG/3 ML, 3 mL, Nebulization, Q6H PRN, Camryn Bob MD    artificial saliva (MOUTH KOTE) 1 Spray, 1 Spray, Oral, TID, Camryn Bob MD, 1 Dexter at 06/07/22 0846    brimonidine (ALPHAGAN) 0.2 % ophthalmic solution 1 Drop, 1 Drop, Both Eyes, TID, Camryn Bob MD, 1 Drop at 06/07/22 0847    cholestyramine-aspartame (QUESTRAN LIGHT) packet 4 g, 4 g, Oral, BID WITH MEALS, Camryn Bob MD, 4 g at 06/07/22 0845    [Held by provider] fenofibrate nanocrystallized (TRICOR) tablet 48 mg, 48 mg, Oral, DAILY, Camryn Bob MD, 48 mg at 05/19/22 8608    gabapentin (NEURONTIN) capsule 300 mg, 300 mg, Oral, BID, Camryn Bob MD, 300 mg at 06/07/22 0845    insulin glargine (LANTUS) injection 50 Units, 50 Units, SubCUTAneous, DAILY, Camryn Bob MD, 50 Units at 06/06/22 0849    latanoprost (XALATAN) 0.005 % ophthalmic solution 1 Drop, 1 Drop, Right Eye, QPM, Yarelis Bob MD, 1 Drop at 06/06/22 1743    traMADoL (ULTRAM) tablet 25 mg, 25 mg, Oral, Q12H PRN, Yarelis Bob MD, 25 mg at 06/03/22 4907

## 2022-06-07 NOTE — PROGRESS NOTES
IMPRESSION:   1. Acute hypoxic respiratory failure extubated patient was put on BiPAP machine as her condition got worse  2. Pulmonary edema mild  3. Aspiration chronically  4. Sacral decubiti ulcer with Acinetobacter and Enterococcus  5. Severe sepsis with septic shock resolved on no pressors  6. Left foot wound  7. Candidal UTI  8. Transaminitis   9. Shock liver resolved  10. Hypokalemia repleted  11. Symptomatic anemia stable after transfusion  12. Cardiomyopathy ejection fraction 50 to 55%  13. Mild pulmonary edema      RECOMMENDATIONS/PLAN:   1. ICU monitoring  1. Extubated on 6/3 worsening tachypnea she was placed on noninvasive ventilator likely recurrent aspiration and fluid overload, will remove BiPAP and keep it at night and during the daytime will start patient on nasal cannula but she is still desaturate quickly will start on high flow nasal cannula  2. Continue with Lasix  3. Drop in hemoglobin we will continue to follow  4. Continue with Robinul  5. New onset cardiomyopathy with the Lasix  6. Persistent infection on Unasyn and vancomycin  7. Chest x-ray shows worsening of the congestion  8. Patient underwent on 5/23 left metatarsal amputation and sacral wound debridement  9. Severe sepsis with decubiti ulcer   10. Patient is on Unasyn  11.  Chest x-ray shows mild congestive changes with fluid in the minor fissure     [x] High complexity decision making was performed  [x] See my orders for details  HPI   37-year-old lady came in because of leg pain past medical history of hypertension diabetes mellitus peripheral vascular disease CVA she is bedbound she has leg wound and also has sacral decubiti ulcer and she was scheduled for laparoscopic colostomy and sacral wound debridement and amputation of the left tarsometatarsal because of wound infection but her condition got worse her liver enzyme was elevated INR was also elevated she was intubated transferred to ICU was getting vancomycin and Unasyn started on Levophed because of low blood pressure. PMH:  has a past medical history of Anemia, Chronic kidney disease, Diabetes mellitus ([address] Hemiplegia affecting dominant side, post-stroke ([facility] [address] (05/04/2022), HTN (hypertension), Hyperkalemia, Hyperlipidemia, Ill-defined condition, Neuropathy, PAD (peripheral artery disease) ([address] Sciatica, Stroke ([address] and UTI (urinary tract infection). PSH:   has a past surgical history that includes hx other surgical (Bilateral); hx amputation (Right); hx other surgical; hx cervical fusion; hx vascular stent (Bilateral); hx vascular stent (Bilateral); hx gi; and ir insert non tunl cvc over 5 yrs (5/27/2022). FHX: family history includes Cancer in her mother; Diabetes in her mother; Hypertension in her mother. SHX:  reports that she has never smoked. She has never used smokeless tobacco. She reports previous alcohol use. She reports that she does not use drugs.     ALL: No Known Allergies     MEDS:   [x] Reviewed - As Below   [] Not reviewed    Current Facility-Administered Medications   Medication    furosemide (LASIX) injection 40 mg    glycopyrrolate (ROBINUL) injection 0.1 mg    ferrous sulfate 300 mg (60 mg iron)/5 mL oral syrup 300 mg    metoprolol tartrate (LOPRESSOR) tablet 12.5 mg    alteplase (CATHFLO) injection 2 mg    fluconazole (DIFLUCAN) 200mg/100 mL IVPB (premix)    aspirin chewable tablet 81 mg    NOREPINephrine (LEVOPHED) 8 mg in 0.9% NS 250ml infusion    sodium chloride (NS) flush 5-40 mL    sodium chloride (NS) flush 5-40 mL    ondansetron (ZOFRAN) injection 4 mg    enoxaparin (LOVENOX) injection 40 mg    insulin lispro (HUMALOG) injection    glucose chewable tablet 16 g    glucagon (GLUCAGEN) injection 1 mg    hydrALAZINE (APRESOLINE) tablet 12.5 mg    sacubitriL-valsartan (ENTRESTO) 24-26 mg tablet 1 Tablet    ampicillin-sulbactam (UNASYN) 3 g in 0.9% sodium chloride (MBP/ADV) 100 mL MBP    dextrose 10% infusion 0-250 mL    insulin glargine (LANTUS) injection 10 Units    propofol (DIPRIVAN) 10 mg/mL infusion    0.9% sodium chloride infusion 250 mL    Vancomycin Pharmacy Dosing    acetaminophen (TYLENOL) tablet 650 mg    Or    acetaminophen (TYLENOL) suppository 650 mg    polyethylene glycol (MIRALAX) packet 17 g    ondansetron (ZOFRAN ODT) tablet 4 mg    famotidine (PEPCID) tablet 20 mg    acidophilus-pectin, citrus tablet 2 Tablet    albuterol-ipratropium (DUO-NEB) 2.5 MG-0.5 MG/3 ML    artificial saliva (MOUTH KOTE) 1 Spray    brimonidine (ALPHAGAN) 0.2 % ophthalmic solution 1 Drop    cholestyramine-aspartame (QUESTRAN LIGHT) packet 4 g    [Held by provider] fenofibrate nanocrystallized (TRICOR) tablet 48 mg    gabapentin (NEURONTIN) capsule 300 mg    insulin glargine (LANTUS) injection 50 Units    latanoprost (XALATAN) 0.005 % ophthalmic solution 1 Drop    traMADoL (ULTRAM) tablet 25 mg      MAR reviewed and pertinent medications noted or modified as needed   Current Facility-Administered Medications   Medication    furosemide (LASIX) injection 40 mg    glycopyrrolate (ROBINUL) injection 0.1 mg    ferrous sulfate 300 mg (60 mg iron)/5 mL oral syrup 300 mg    metoprolol tartrate (LOPRESSOR) tablet 12.5 mg    alteplase (CATHFLO) injection 2 mg    fluconazole (DIFLUCAN) 200mg/100 mL IVPB (premix)    aspirin chewable tablet 81 mg    NOREPINephrine (LEVOPHED) 8 mg in 0.9% NS 250ml infusion    sodium chloride (NS) flush 5-40 mL    sodium chloride (NS) flush 5-40 mL    ondansetron (ZOFRAN) injection 4 mg    enoxaparin (LOVENOX) injection 40 mg    insulin lispro (HUMALOG) injection    glucose chewable tablet 16 g    glucagon (GLUCAGEN) injection 1 mg    hydrALAZINE (APRESOLINE) tablet 12.5 mg    sacubitriL-valsartan (ENTRESTO) 24-26 mg tablet 1 Tablet    ampicillin-sulbactam (UNASYN) 3 g in 0.9% sodium chloride (MBP/ADV) 100 mL MBP    dextrose 10% infusion 0-250 mL    insulin glargine (LANTUS) injection 10 Units    propofol (DIPRIVAN) 10 mg/mL infusion    0.9% sodium chloride infusion 250 mL    Vancomycin Pharmacy Dosing    acetaminophen (TYLENOL) tablet 650 mg    Or    acetaminophen (TYLENOL) suppository 650 mg    polyethylene glycol (MIRALAX) packet 17 g    ondansetron (ZOFRAN ODT) tablet 4 mg    famotidine (PEPCID) tablet 20 mg    acidophilus-pectin, citrus tablet 2 Tablet    albuterol-ipratropium (DUO-NEB) 2.5 MG-0.5 MG/3 ML    artificial saliva (MOUTH KOTE) 1 Spray    brimonidine (ALPHAGAN) 0.2 % ophthalmic solution 1 Drop    cholestyramine-aspartame (QUESTRAN LIGHT) packet 4 g    [Held by provider] fenofibrate nanocrystallized (TRICOR) tablet 48 mg    gabapentin (NEURONTIN) capsule 300 mg    insulin glargine (LANTUS) injection 50 Units    latanoprost (XALATAN) 0.005 % ophthalmic solution 1 Drop    traMADoL (ULTRAM) tablet 25 mg      PMH:  has a past medical history of Anemia, Chronic kidney disease, Diabetes mellitus ([address] Hemiplegia affecting dominant side, post-stroke ([address] (05/04/2022), HTN (hypertension), Hyperkalemia, Hyperlipidemia, Ill-defined condition, Neuropathy, PAD (peripheral artery disease) ([address] Sciatica, Stroke ([address] and UTI (urinary tract infection). PSH:   has a past surgical history that includes hx other surgical (Bilateral); hx amputation (Right); hx other surgical; hx cervical fusion; hx vascular stent (Bilateral); hx vascular stent (Bilateral); hx gi; and ir insert non tunl cvc over 5 yrs (5/27/2022). FHX: family history includes Cancer in her mother; Diabetes in her mother; Hypertension in her mother. SHX:  reports that she has never smoked. She has never used smokeless tobacco. She reports previous alcohol use. She reports that she does not use drugs.      ROS:  Unable to obtain intubated on ventilator    Hemodynamics:    CO:    CI:    CVP:    SVR:   PAP Systolic:    PAP Diastolic:    PVR:    OI09:        Ventilator Settings:      Mode Rate TV Press PEEP FiO2 PIP Min. Vent   Pressure support,Spontaneous    500 ml 15 cm H2O  5 cm H20 30 %  27 cm H2O  12.4 l/min        Vital Signs: Telemetry:    normal sinus rhythm Intake/Output:   Visit Vitals  /68 (BP 1 Location: Right upper arm, BP Patient Position: At rest)   Pulse 94   Temp (!) 101 °F (38.3 °C)   Resp 23   Ht 5' 6.93\" (1.7 m)   Wt 95.1 kg (209 lb 10.5 oz)   SpO2 100%   Breastfeeding No   BMI 32.91 kg/m²       Temp (24hrs), Av.2 °F (37.3 °C), Min:98.4 °F (36.9 °C), Max:101 °F (38.3 °C)        O2 Device: BIPAP O2 Flow Rate (L/min): 3 l/min       Wt Readings from Last 4 Encounters:   22 95.1 kg (209 lb 10.5 oz)   04/10/22 86.6 kg (191 lb)   22 82.2 kg (181 lb 3.5 oz)   20 84.4 kg (186 lb)          Intake/Output Summary (Last 24 hours) at 2022 0811  Last data filed at 2022 0805  Gross per 24 hour   Intake 2655 ml   Output 1650 ml   Net 1005 ml       Last shift:      701 -  1900  In: 75   Out: -   Last 3 shifts:  190 -  0700  In: 2496 [I.V.:400]  Out: 2325 [Urine:2225; Drains:100]       Physical Exam:     General: On noninvasive ventilator weakly tries to open her eyes follows no commands  HEENT: NCAT,  Eyes: anicteric; conjunctiva clear, random eye movement when eyelids are open  Neck: no nodes, , trach midline; no accessory MM use. Neck veins obscured by obesity but seems slightly engorged  Chest: no deformity,   Cardiac: R regular; no murmur;   Lungs: Shallow breaths with diffuse wheezes and rhonchi  Abd: soft, NT, hypoactive BS  Ext: Diffuse edema; no joint swelling;  No clubbing  :clear urine  Neuro: Seems to weakly try to open her eyes follows no commands does not speak does not move her extremities  Psych-  unable to assess  Skin: warm, dry, no cyanosis;   Pulses: Brachial and radial pulses intact  Capillary: Normal capillary refill      DATA:    MAR reviewed and pertinent medications noted or modified as needed  MEDS:   Current Facility-Administered Medications   Medication    furosemide (LASIX) injection 40 mg    glycopyrrolate (ROBINUL) injection 0.1 mg    ferrous sulfate 300 mg (60 mg iron)/5 mL oral syrup 300 mg    metoprolol tartrate (LOPRESSOR) tablet 12.5 mg    alteplase (CATHFLO) injection 2 mg    fluconazole (DIFLUCAN) 200mg/100 mL IVPB (premix)    aspirin chewable tablet 81 mg    NOREPINephrine (LEVOPHED) 8 mg in 0.9% NS 250ml infusion    sodium chloride (NS) flush 5-40 mL    sodium chloride (NS) flush 5-40 mL    ondansetron (ZOFRAN) injection 4 mg    enoxaparin (LOVENOX) injection 40 mg    insulin lispro (HUMALOG) injection    glucose chewable tablet 16 g    glucagon (GLUCAGEN) injection 1 mg    hydrALAZINE (APRESOLINE) tablet 12.5 mg    sacubitriL-valsartan (ENTRESTO) 24-26 mg tablet 1 Tablet    ampicillin-sulbactam (UNASYN) 3 g in 0.9% sodium chloride (MBP/ADV) 100 mL MBP    dextrose 10% infusion 0-250 mL    insulin glargine (LANTUS) injection 10 Units    propofol (DIPRIVAN) 10 mg/mL infusion    0.9% sodium chloride infusion 250 mL    Vancomycin Pharmacy Dosing    acetaminophen (TYLENOL) tablet 650 mg    Or    acetaminophen (TYLENOL) suppository 650 mg    polyethylene glycol (MIRALAX) packet 17 g    ondansetron (ZOFRAN ODT) tablet 4 mg    famotidine (PEPCID) tablet 20 mg    acidophilus-pectin, citrus tablet 2 Tablet    albuterol-ipratropium (DUO-NEB) 2.5 MG-0.5 MG/3 ML    artificial saliva (MOUTH KOTE) 1 Spray    brimonidine (ALPHAGAN) 0.2 % ophthalmic solution 1 Drop    cholestyramine-aspartame (QUESTRAN LIGHT) packet 4 g    [Held by provider] fenofibrate nanocrystallized (TRICOR) tablet 48 mg    gabapentin (NEURONTIN) capsule 300 mg    insulin glargine (LANTUS) injection 50 Units    latanoprost (XALATAN) 0.005 % ophthalmic solution 1 Drop    traMADoL (ULTRAM) tablet 25 mg        Labs:    Recent Labs     06/07/22  0330 06/06/22  0350 06/05/22  0300   WBC 15.0* 12.5* 15.0*   HGB 7.8* 7.9* 9.5*    263 308 Recent Labs     22  0330 22  0350 22  0300   * 145 143   K 3.8 3.9 4.3   * 114* 114*   CO2 22 20* 22   * 229* 245*   BUN 42* 37* 29*   CREA 1.00 1.08* 1.01   CA 9.1 9.3 9.0   MG  --  2.1  --    PHOS  --  2.6  2.5*  --    ALB 2.4* 2.7* 1.5*   ALT 73 47 41     Recent Labs     22  0230 22  0235   PH 7.39 7.34*   PCO2 31* 36   PO2 75 60*   HCO3 20* 20*   FIO2 30.0  --    6/ and IV IP 16 EP 5 rate 16 FiO2 30%    AC 12  PEEP 5 FiO2 30%   echo ejection fraction 20% mild mitral regurg left atrium 3.5 cm RVSP 35  , 1313, 1361    Lab Results   Component Value Date/Time    Culture result: Heavy  Mixed skin yasmine isolated   2022 06:15 PM    Culture result: Rare Normal respiratory yasmine 2022 02:48 PM    Culture result: Heavy Acinetobacter baumannii complex 05/15/2022 07:15 PM    Culture result: Heavy Diphtheroids 05/15/2022 07:15 PM     Lab Results   Component Value Date/Time    TSH 2.880 2016 10:13 AM        Imagin/22 chest x-ray with ET tube in place hazy accentuated basilar markings with small amount of fluid in the minor fissure  Results from Hospital Encounter encounter on 22    XR CHEST PORT    Narrative  Chest single view. Comparison single view chest 2022. Stable right CVC. Asymmetric left greater than right central interstitial edema perhaps mildly  reduced compared to prior imaging. Cardiac and mediastinal structures unchanged. No pneumothorax or sizable pleural effusion. Results from [place] encounter on 22    CT ABD PELV WO CONT    Narrative  CT ABD PELV WO CONT    Technique: Noncontrasted CT scan of the abdomen and pelvis was performed  utilizing transaxial images obtained from the dome of the diaphragm to the pubic  symphysis. Coronal and sagittal reconstructions were generated.     Dose Reduction:  All CT scans at this facility are performed using dose reduction optimization  techniques as appropriate to a performed exam including the following: Automated  exposure control, adjustments of the mA and/or kV according to patient size, or  use of iterative reconstruction technique. Comparison:  2/25/2022. Findings:  Small-moderate pleural effusions are present with bibasilar  atelectasis. Atherosclerotic calcifications are again evident including coronary  artery calcifications. Gastrostomy tube tip in the gastric antrum. The liver, spleen, pancreas, and  adrenal glands are unremarkable for noncontrasted technique. Stable lobulated  kidneys. Negative for hydronephrosis. Gallbladder is unremarkable. No biliary  duct dilatation apparent. The abdominal aorta is normal in caliber. There is no evidence of bowel obstruction. Small volume ascites has developed. The urinary bladder is decompressed by a Bell catheter. The uterus is  unremarkable. The appendix is normal.    There is mild body wall edema. No suspicious lytic or blastic lesion evident. Impression  Third spacing of fluids with small volume ascites, small-moderate  pleural effusions, and mild body wall edema. Bibasilar atelectasis. 5/20 intubated on ventilator we will continue current vent settings patient is supposed to go for surgery because of transaminitis elevated liver enzyme we will wait as per surgeon for sacral wound debridement and diverting loop colostomy off Levophed, improvement in AST and ALT  5/21 continue with the current vent settings ABG acceptable liver enzyme improving awaiting for the surgery  5/22 maintain ventilator as is in anticipation of surgery.   Renal function improved mild congestion on chest x-ray we will give 1 dose Lasix and potassium  5/23 patient is going for surgery today we will leave ventilator as it is INR is 1.5  5/25 patient remained on ventilator intubated doing well, patient received vitamin K fresh frozen plasma schedule for laparoscopic loop colostomy possible today  5/26 no surgery has been plans will start weaning do sedation vacation  5/27 try to do sedation vacation to wean patient blood pressure dropped still on Levophed we will continue to wean discussed with the family at bedside  5/28 remains on ventilator sedated and also on Levophed possible surgery next week  5/29 on ventilator ET tube advisement 1 to 1.5 cm we will repeat chest x-ray ABG acceptable  5/30 continue with current vent setting  5/31 awaiting for possible surgery if not we will start weaning  6/1 remain intubated on ventilator surgery has been delayed we will start weaning and plan for extubation  6/2 no surgery was planned so we will try to wean she is off Levophed we will do sedation vacation and if weaning cardio acceptable will extubate  6/3 change vent settings to spontaneous if weaning creatinine acceptable will extubate  6/4 extubated on 6/3 now on nasal cannula tolerating well  6/5 respiratory distress last night placed on noninvasive ventilator chest x-ray continues to show fluid overload. Will increase Lasix to 40 mg twice daily give albumin today. Likely has continued aspiration.   Will increase Robinul to every 6 hours dosing  6/6 patient is alert awake he is full code we will try to wean BiPAP put on nasal cannula and keep BiPAP at night  6/7 noninvasive ventilator Machine we will change it to high flow nasal cannula  Time of care 30 minutes

## 2022-06-07 NOTE — PROGRESS NOTES
Progress Note    Patient: Rahul Arrieta MRN: [id number]  SSN: [id number]    YOB: 1945  Age: 78 y.o. Sex: female      Admit Date: 5/14/2022    LOS: 24 days     Subjective:   Patient followed for sacral wound infection with repeat culture growing Acinetobacter and Enterococci. Left foot wound also grew Acinetobacter, now status post left TMA. WBC now decreasing along with CRP and procal.  Urinalysis showing marked pyuria and yeasts for which she is on Fluconazole. She is currently on Vancomycin and Unasyn. Patient has now been extubated. She is awake but nonverbal.  CXR unchanged today. Family member at bedside states that they had a meeting earlier today and decision was made to place her on hospice and taken home later this week. Objective:     Vitals:    06/07/22 1400 06/07/22 1500 06/07/22 1535 06/07/22 1600   BP: 130/62 133/66  (!) (P) 118/55   Pulse: 84 83     Resp: 26 22     Temp:  99.9 °F (37.7 °C)     SpO2: 100% 100% 100% (!) (P) 88%   Weight:       Height:            Intake and Output:  Current Shift: 06/07 0701 - 06/07 1900  In: 225   Out: -   Last three shifts: 06/05 1901 - 06/07 0700  In: 9568 [I.V.:400]  Out: 2325 [Urine:2225; Drains:100]    Physical Exam:    Vitals and nursing note reviewed. Constitutional:       General: She is not in acute distress. Appearance: She is ill-appearing. HENT: Nasal O2 cannula    Eyes: open        Cardiovascular:      Rate and Rhythm: Normal rate and regular rhythm. Heart sounds: No murmur heard. Pulmonary: clear bilaterally    Abdominal:      General: Bowel sounds are normal.      Palpations: Abdomen is soft. Tenderness: There is no abdominal tenderness. Comments: PEG site unremarkable today      Genitourinary:     Comments: Indwelling Bell with cloudy urine             Musculoskeletal:      Cervical back: Neck supple. Right lower leg: No edema. Left lower leg: No edema.  No     Comments: Left foot surgical wound now shows dehiscence distally with bloody drainage  Skin:     Findings: No rash. Comments: Sacral wound stool soiled with displaced wound vac sponge  Neurological: intubated   Psychiatric:  intubated      Lab/Data Review:     WBC 15,000    ALT/AST 55 /14  Urinalysis with WBC >100, Yeasts, Bacteria negative    Procal 0.84 <0.64 <0.31 <0.26 <0.24 <0.31 <0.36  <0.59 <0.57  CRP 14.20 < 13.60 < 14.60 <11.40 <12.60  <18.90 <17.90    Blood cultures (5/14) Coagulase negative Staphylococci  Sacral wound (5/14) Acinetobacter baumannii sensitive to Unasyn, Cefepime, Ceftazidime and Enterococcus faecium resistant to Ampicillin  Sacral wound (5/23) Mixed skin yasmine FINAL  Left great toe (5/15) Acinetobacter baumannii  Sputum culture (5/19) Rare normal respiratory yasmine FINAL     CXR (6/7)  Asymmetric left greater than right central interstitial edema perhaps mildly  reduced compared to prior imaging. Cardiac and mediastinal structures unchanged. No pneumothorax or sizable pleural effusion. Assessment:     Active Problems:    Sacral ulcer ([address] (5/14/2022)    1. Sacral wound infection secondary now to Acinetobacter baumannii and Enterococcus faecium, on Vancomycin and Unasyn, status post excisional wound debridement to the bone, Day #18 Vancomycin and Day #14 Unasyn. 2. Sepsis with persistent leukocytosis, elevated procal and CRP  3. Left foot wound, culture growing Acinetobacter baumannii, status post TMA  4. Possible ischemic hepatitiis with transaminitis following hypotensive episode, resolving  5. Positive blood cultures for Coagulase negative Staphylococci, probable contaminant  6. Hepatitis C antibody positive, resolved (negative HCV viral level)  7. New onset cardiomyopathy  8. Candida UTI with marked pyuria and yeasts, Day #7 IV Fluconazole  9. ? Hospice planned    Comment:  WBC, CRP and procal remain elevated. Plan:   1. Continue IV Unasyn and Vancomycin (for E.  Faecium) unless placed on hospice  2.  Continue Fluconazole 200 mg IV daily for 7 more days or until placed on hospice       Signed By: Tayla Almonte MD     June 7, 2022

## 2022-06-07 NOTE — PROGRESS NOTES
Vancomycin Dosing Consult  Day #23 of vancomycin therapy  Consult ordered by Dr. Landon Mcgill for this 78 y.o. female for indication of sacral wound infection, sepsis. Antibiotic regimen: Vancomycin + Unasyn  + fluconazole    Temp (24hrs), Av.2 °F (37.3 °C), Min:98.4 °F (36.9 °C), Max:101 °F (38.3 °C)    Recent Labs     22  0330 22  0350 22  0300   WBC 15.0* 12.5* 15.0*   CREA 1.00 1.08* 1.01   BUN 42* 37* 29*     Est CrCl: ~55-60 mL/min; UO: 0.4 mL/kg/hr  Concomitant nephrotoxic drugs: Off vasopressors    Cultures:    Wound: Moderate MDR Pseudomonas aeruginosa susceptible to Zerbaxa, aminoglycosides), moderate mixed skin yasmine, moderate mixed enteric yasmine including yeast, final   Blood: CoNS in the anaerobic bottle, final   Wound, ulcer: Moderate Acinetobacter baumannii, moderate Enterococcus faecium, light >2 organisms (contraindicated), final  5/15 Wound, great toe: Heavy Acinetobacter baumannii complex (Zosyn resistant, susceptible to Unasyn), heavy Diphtheroids, final   Tissue: Heavy mixed skin yasmine, final    MRSA Swab: Not ordered, patient already received first dose of vancomycin    Target range: Trough 10-15 mcg/mL    Recent level history:  Date/Time Dose & Interval Measured Level (mcg/mL)    @ 0445 750 mg x 1 19.8    @ 0321 Held 15.4    @ 0400 750 mg x 1 (given ) 13.6    @ 0300 500 mg x 1 15.3    @ 0350 500 Mg X1 at 0900 11.2    @ 0443 500 mg X1 at 1100 13.6        Assessment/Plan:   Afebrile, continued leukocytosis, procal remain elevated  CKD, SCr =1.0  today, clearance of vancomycin remains quite slow relative to renal function  Random level is at  13.4 today.   Will give 500 mg  dose today and check a random level tomorrow AM 22  at  0400 am.  Antimicrobial stop date TBD

## 2022-06-07 NOTE — PROGRESS NOTES
Nutrition Assessment     Type and Reason for Visit: Reassess (goal)    Nutrition Recommendations/Plan:   1. Continue TF via PEG to Glucerna 1.5 continuous at 60mL/hr, continuous with water flushes 75 mL q4hrs. Provide 2 pkt Doug Daily (180kcals, 5g pro)        Provides 2340 kcal (100%), 124g PRO (100%), 1544 mL H2O (97%)            Propofol 20mcg/kg/min/ 250kcals(107%)   Monitor and Record wts, TF/Water flushes rates, OPs & BMs in I/Os      Nutrition Assessment:  Admitted for sacral PI, (4/21) I/D. (5/19) Intubated. (5/23) Repeat I&D, L TMA--Wound vacs placed. Plan for diverting colostomy however family now declined d/t high leah-operative risk. (6/3) extubated after >2wks on vent. (5/15) TF initiated, intermittently held for procedures, pressors, adjusted during intubation. (6/3) Adjusted back to Glucerna at last assessment, current regimen appropriate s/p extube. Will start wound healing supplement. (6/7): requiring BiPaP and propofol, tolerating TF at goal rate w/o complications. Labs: Na (148), glucose (108), Bun (42), alb (2.4). Meds: PPI, lovenox, ferrous sulfate, Lasix, Neurontin, lantus, Humalog, Lopressor. Malnutrition Assessment:  Malnutrition Status: Mild malnutrition     Estimated Daily Nutrient Needs:  Energy (kcal):  2000-2400kcals (25-30kcals/kg EDW bedbound vs wounds)  Protein (g):  120-136 (1.5-1.7g/kg)       Fluid (ml/day):  1600-2000ml (20-25ml/kg)    Nutrition Related Findings:  NFPE finding no nutrition-related deficits, however difficult to tell through edema and adiposity. No reported n/v/c/d, non-pitting edema all extremities. Peg placed- min op (10ml). Current Nutrition Therapies:  DIET NPO  ADULT ORAL NUTRITION SUPPLEMENT Lunch; Wound Healing Supplement  ADULT TUBE FEEDING PEG; Diabetic; Delivery Method: Continuous; Continuous Initial Rate (mL/hr): 60; Continuous Advance Tube Feeding: No; Water Flush Volume (mL): 75; Water Flush Frequency: Q 4 hours; Modulars/Additives:  Wound Healing; Specify How . .. Anthropometric Measures:  Height:  5' 6.93\" (170 cm)  Current Body Wt:  94.8 kg (209 lb)  BMI: 32.8    Nutrition Diagnosis:   · Inadequate oral intake related to cognitive or neurological impairment,biting/chewing (masticatory) difficulty,swallowing difficulty (CVA) as evidenced by nutrition support-enteral nutrition      Nutrition Interventions:   Food and/or Nutrient Delivery: Continue oral nutrition supplement,Continue tube feeding  Nutrition Education/Counseling: No recommendations at this time  Coordination of Nutrition Care: Continue to monitor while inpatient  Plan of Care discussed with: RN    Goals:  Previous Goal Met: Progressing toward goal(s)  Goals: Meet at least 75% of estimated needs,Tolerate nutrition support at goal rate,within 7 days  Specify Other Goals: Maintain BGs <180 mg/dL.  Maintain CBW within +/- 0.5 kg    Nutrition Monitoring and Evaluation:   Behavioral-Environmental Outcomes: None identified  Food/Nutrient Intake Outcomes: Enteral nutrition intake/tolerance,Supplement intake  Physical Signs/Symptoms Outcomes: Biochemical data,Fluid status or edema,Skin,Weight,Hemodynamic status    Discharge Planning:    Enteral nutrition    Ivonne Marshall  Contact: [phone]

## 2022-06-08 NOTE — PROGRESS NOTES
General Daily Progress Note          Patient Name:   Radha Richey       YOB: 1945       Age:  78 y.o. 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 Cipro    Admitted again yesterday concerning for worsening of sacral ulcer    During last admission patient was seen by infectious disease and also seen by the vascular surgeon patient had debridement of wound during the last admission    Patient seen by the infectious disease yesterday    Sacral wound infection recent cultures growing Pseudomonas resistant to Zosyn and meropenem    5/17    Patient alert awake not in distress  Patient was seen by the vascular surgeon    Vascular surgery planned for laparoscopic loop colostomy placement and sacral debridement then wound vacuum  Also try to close the wound on the left foot with TMA    5/18    Resting in the bed not in any distress  Blood sugars running high    Seen by infectious disease continue vancomycin and start on Unasyn    5/19    And went to the OR intubated because of elevated LFT and elevated INR  Surgery was canceled    On ventilator 40% O2  Propofol drip    5/20    Patient on ventilator with 30% O2  On propofol drip    Hemoglobin dropped to 6.8    Patient's daughter at the bedside    5/20    Patient on ventilator 30% O2  On propofol drip  Getting PEG tube feeding    5/21    Patient still on ventilator 30% O2  On propofol drip  Liver Function improving    5/22  On ventilator with 30% O2 on propofol drip    5/24  Awaiting tissue culture results.    On ventilator with 30% O2 on propofol drip  Left transmetatarsal amputation and Sacral wound excisional wound debridement 13 x 15 cm to the bone completed yesterday. CXR: Hazy mid and lower lung reticular markings.  Dependent small to moderate volume,  right greater than left pleural effusions  Remarkable labs   WBC: 15.2   Hgb: 8.5   Na; 148  CRP: 13.60   Procal: 0.71      5/25    Patient on ventilator with 30% O2  Seen by the vascular surgeon and plan for surgery today    5/26  Patient on ventilator with 30% O2  Hypotensive on Levophed  On propofol drip    Surgery was canceled yesterday because patient unstable hemodynamically not cleared by the anesthesia    5/27    Patient on ventilator with 30% O2  Hypotensive on Levophed  Getting PEG tube feeding    5/30     Patient on ventilator with 21% O2  On propofol drip  Off pressor    5/31    Patient on ventilator with 21% O2  On propofol drip    Off pressor  Patient have a wound vacuum      6/1    Patient scheduled for surgery today    6/2    Surgery was canceled yesterday  As overall prognosis very poor  Patient on vent on 21% O2  Getting PEG tube feeding  Wound vacuum intact      6/3    Patient still on ventilator  Seen by vascular surgeon left foot concern of ischemic changes    6/4    Patient extubated /tachypneic daughter at the bedside    6/5    Patient on BiPAP  30% O2    6/6    Patient on BiPAP 30% O2    Awake not in distress    6/7    Still on BiPAP 30% O2  Awake    6/8    Patient alert awake on BiPAP 30% O2 patient's daughter at the bedside  Still waiting for family decision regarding further treatment plan  Regarding hospice          Objective:     Visit Vitals  BP (!) 147/69 (BP 1 Location: Left upper arm, BP Patient Position: At rest)   Pulse 88   Temp 99 °F (37.2 °C)   Resp 27   Ht 5' 6.93\" (1.7 m)   Wt 95.1 kg (209 lb 10.5 oz)   SpO2 100%   Breastfeeding No   BMI 32.91 kg/m²        Recent Results (from the past 24 hour(s))   GLUCOSE, POC    Collection Time: 06/07/22 11:18 AM   Result Value Ref Range    Glucose (POC) 105 65 - 117 mg/dL    Performed by [address] [place] [address], POC    Collection Time: 06/07/22  5:37 PM   Result Value Ref Range    Glucose (POC) 164 (H) 65 - 117 mg/dL    Performed by [address] [place] [address], POC    Collection Time: 06/08/22  1:48 AM   Result Value Ref Range    Glucose (POC) 226 (H) 65 - 117 mg/dL    Performed by Riccovie Nataly    MAGNESIUM    Collection Time: 06/08/22  3:51 AM   Result Value Ref Range    Magnesium 2.3 1.6 - 2.4 mg/dL   PHOSPHORUS    Collection Time: 06/08/22  3:51 AM   Result Value Ref Range    Phosphorus 2.2 (L) 2.6 - 4.7 mg/dL   CBC W/O DIFF    Collection Time: 06/08/22  3:51 AM   Result Value Ref Range    WBC 12.8 (H) 3.6 - 11.0 K/uL    RBC 2.63 (L) 3.80 - 5.20 M/uL    HGB 7.5 (L) 11.5 - 16.0 g/dL    HCT 24.4 (L) 35.0 - 47.0 %    MCV 92.8 80.0 - 99.0 FL    MCH 28.5 26.0 - 34.0 PG    MCHC 30.7 30.0 - 36.5 g/dL    RDW 17.9 (H) 11.5 - 14.5 %    PLATELET 987 583 - 669 K/uL    MPV 11.3 8.9 - 12.9 FL    NRBC 0.8 (H) 0.0  WBC    ABSOLUTE NRBC 0.10 (H) 0.00 - 3.41 K/uL   METABOLIC PANEL, COMPREHENSIVE    Collection Time: 06/08/22  3:51 AM   Result Value Ref Range    Sodium 146 (H) 136 - 145 mmol/L    Potassium 4.0 3.5 - 5.1 mmol/L    Chloride 119 (H) 97 - 108 mmol/L    CO2 23 21 - 32 mmol/L    Anion gap 4 (L) 5 - 15 mmol/L    Glucose 263 (H) 65 - 100 mg/dL    BUN 48 (H) 6 - 20 mg/dL    Creatinine 1.14 (H) 0.55 - 1.02 mg/dL    BUN/Creatinine ratio 42 (H) 12 - 20      GFR est AA 56 (L) >60 ml/min/1.73m2    GFR est non-AA 46 (L) >60 ml/min/1.73m2    Calcium 8.1 (L) 8.5 - 10.1 mg/dL    Bilirubin, total 0.6 0.2 - 1.0 mg/dL    AST (SGOT) 77 (H) 15 - 37 U/L    ALT (SGPT) 99 (H) 12 - 78 U/L    Alk.  phosphatase 137 (H) 45 - 117 U/L    Protein, total 7.3 6.4 - 8.2 g/dL    Albumin 2.1 (L) 3.5 - 5.0 g/dL    Globulin 5.2 (H) 2.0 - 4.0 g/dL    A-G Ratio 0.4 (L) 1.1 - 2.2     VANCOMYCIN, RANDOM    Collection Time: 06/08/22  3:51 AM   Result Value Ref Range    Vancomycin, random 14.6 ug/mL   GLUCOSE, POC    Collection Time: 06/08/22  6:18 AM   Result Value Ref Range    Glucose (POC) 244 (H) 65 - 117 mg/dL    Performed by [facility].      [unfilled]      Review of Systems    Not a good historian e. Physical Exam:      Constitutional: On ventilator  HENT:   Head: Normocephalic and atraumatic. Eyes: Pupils are equal, round, and reactive to light. EOM are normal.   Cardiovascular: Normal rate, regular rhythm and normal heart sounds. Pulmonary/Chest: Breath sounds normal. No wheezes. No rales. Exhibits no tenderness. Abdominal: Soft. Bowel sounds are normal. There is no abdominal tenderness. There is no rebound and no guarding. Musculoskeletal: Normal range of motion. Neurological: On ventilator   skin sacral decubitus ulcer and left foot wound right big toe amputation    XR CHEST PORT   Final Result      XR CHEST PORT   Final Result   Pulmonary vascular congestion and predominantly perihilar opacities,   increased. XR CHEST PORT   Final Result   Worsening lung aeration. XR CHEST PORT   Final Result   No significant change. XR CHEST PORT   Final Result   No significant change. XR CHEST PORT   Final Result      XR CHEST PORT   Final Result      XR CHEST PORT   Final Result   No interval change or acute process. XR CHEST PORT   Final Result   ET tube retracted from previous, with tip now at the level of the   thoracic inlet, 8-9 cm above the jennifer. Stable appearance of right central   line. Stable mild enlargement of cardiopericardial silhouette. No evidence of   pulmonary edema, air space pneumonia, or pleural effusion. No evidence of   pneumothorax. Some structures are partially obscured by overlying monitoring   electrodes. XR CHEST PORT   Final Result   Basilar airspace disease, possibly subsegmental atelectasis, stable. XR CHEST PORT   Final Result   Cardiomegaly with vascular congestion. Stable appearance of ET tube.    Right central line placed, without radiographic evidence of complication. IR INSERT NON TUNL CVC OVER 5 YRS   Final Result   Successful placement of a triple-lumen central venous catheter. The   catheter is ready for use. XR CHEST PORT   Final Result      XR CHEST PORT   Final Result      XR CHEST PORT   Final Result      CT ABD PELV WO CONT   Final Result   Third spacing of fluids with small volume ascites, small-moderate   pleural effusions, and mild body wall edema. Bibasilar atelectasis. XR CHEST PORT   Final Result   Similar findings compared to single view chest 1 day earlier. XR CHEST PORT   Final Result   Findings/IMPRESSION: Stable appearance of endotracheal tube. Stable mild   enlargement of cardiomediastinal silhouette. Central vascular congestion with   bilateral perihilar and basilar opacities, consistent with edema and/or   pneumonia, right greater than left. Possible right pleural effusion. No   pneumothorax. XR CHEST PORT   Final Result   Bibasilar opacities, possibly increased on the right. XR CHEST PORT   Final Result   No significant change allowing for difference in technique and   positioning. Single portable AP view compared to yesterday. Suboptimal inspiratory film   compromises visualization of the lower lung fields. Monitoring equipment   overlies the body. The tip of the tube is approximately 3 cm above the jennifer. Mild apparent cardiomegaly. Left heart border and left diaphragm obscured. Hazy airspace opacification both lung bases may be a combination of atelectasis,   pneumonia, or edema. No large effusion. Negative for pneumothorax. XR CHEST PORT   Final Result   No significant change allowing for difference in technique and   positioning. Single portable AP view compared to yesterday. Rotation to the right. Monitoring   equipment overlies the body. Suboptimal inspiratory film compromises   visualization of the lower lung fields. Mild cardiomegaly. The tip of the ET tube is approximately 3 cm above the jennifer. Mild basal interstitial edema. No new consolidation. No pleural effusion or   pneumothorax. XR CHEST PORT   Final Result   1. The endotracheal tube is in satisfactory position. 2.  There has been a partial interval resolution in the pulmonary interstitial   edema pattern. XR CHEST PORT   Final Result   Ill-defined haziness in the mid to lower lung zones suspect for mild   atelectatic changes and/or interstitial fluid or pleural fluid. US ABD LTD   Final Result   1. Question pericholecystic fluid. No identified gallstones or sludge. 2. Right pleural effusion. 3. Increased right renal echotexture for medical renal disease. XR CHEST PORT   Final Result   Intubation. Findings suggesting hydrostatic edema. XR CHEST PORT   Final Result   No evidence of an acute cardiopulmonary process.       IR FLUORO GUIDE PLC CVAD    (Results Pending)   IR US GUIDED VASCULAR ACCESS    (Results Pending)   XR CHEST PORT    (Results Pending)        Recent Results (from the past 24 hour(s))   GLUCOSE, POC    Collection Time: 06/07/22 11:18 AM   Result Value Ref Range    Glucose (POC) 105 65 - 117 mg/dL    Performed by Tiffany Lopez, POC    Collection Time: 06/07/22  5:37 PM   Result Value Ref Range    Glucose (POC) 164 (H) 65 - 117 mg/dL    Performed by Tiffany Lopez, POC    Collection Time: 06/08/22  1:48 AM   Result Value Ref Range    Glucose (POC) 226 (H) 65 - 117 mg/dL    Performed by Lance Varela    MAGNESIUM    Collection Time: 06/08/22  3:51 AM   Result Value Ref Range    Magnesium 2.3 1.6 - 2.4 mg/dL   PHOSPHORUS    Collection Time: 06/08/22  3:51 AM   Result Value Ref Range    Phosphorus 2.2 (L) 2.6 - 4.7 mg/dL   CBC W/O DIFF    Collection Time: 06/08/22  3:51 AM   Result Value Ref Range    WBC 12.8 (H) 3.6 - 11.0 K/uL    RBC 2.63 (L) 3.80 - 5.20 M/uL    HGB 7.5 (L) 11.5 - 16.0 g/dL    HCT 24.4 (L) 35.0 - 47.0 %    MCV 92.8 80.0 - 99.0 FL    MCH 28.5 26.0 - 34.0 PG    MCHC 30.7 30.0 - 36.5 g/dL    RDW 17.9 (H) 11.5 - 14.5 %    PLATELET 035 195 - 469 K/uL    MPV 11.3 8.9 - 12.9 FL    NRBC 0.8 (H) 0.0  WBC    ABSOLUTE NRBC 0.10 (H) 0.00 - 4.49 K/uL   METABOLIC PANEL, COMPREHENSIVE    Collection Time: 06/08/22  3:51 AM   Result Value Ref Range    Sodium 146 (H) 136 - 145 mmol/L    Potassium 4.0 3.5 - 5.1 mmol/L    Chloride 119 (H) 97 - 108 mmol/L    CO2 23 21 - 32 mmol/L    Anion gap 4 (L) 5 - 15 mmol/L    Glucose 263 (H) 65 - 100 mg/dL    BUN 48 (H) 6 - 20 mg/dL    Creatinine 1.14 (H) 0.55 - 1.02 mg/dL    BUN/Creatinine ratio 42 (H) 12 - 20      GFR est AA 56 (L) >60 ml/min/1.73m2    GFR est non-AA 46 (L) >60 ml/min/1.73m2    Calcium 8.1 (L) 8.5 - 10.1 mg/dL    Bilirubin, total 0.6 0.2 - 1.0 mg/dL    AST (SGOT) 77 (H) 15 - 37 U/L    ALT (SGPT) 99 (H) 12 - 78 U/L    Alk.  phosphatase 137 (H) 45 - 117 U/L    Protein, total 7.3 6.4 - 8.2 g/dL    Albumin 2.1 (L) 3.5 - 5.0 g/dL    Globulin 5.2 (H) 2.0 - 4.0 g/dL    A-G Ratio 0.4 (L) 1.1 - 2.2     VANCOMYCIN, RANDOM    Collection Time: 06/08/22  3:51 AM   Result Value Ref Range    Vancomycin, random 14.6 ug/mL   GLUCOSE, POC    Collection Time: 06/08/22  6:18 AM   Result Value Ref Range    Glucose (POC) 244 (H) 65 - 117 mg/dL    Performed by Esau Ferrer        Results     Procedure Component Value Units Date/Time    CULTURE, Minerva Reap STAIN [[id number]] Collected: 06/01/22 1900    Order Status: Canceled Specimen: Wound from [address], Minerva Reap STAIN [[id number]] Collected: 06/01/22 1845    Order Status: Canceled Specimen: Wound from PEG Site     CULTURE, Minerva Reap STAIN [[id number]] Collected: 05/30/22 1800    Order Status: Canceled Specimen: Sacral Wound            Labs:     Recent Labs     06/08/22  0351 06/07/22  0330   WBC 12.8* 15.0*   HGB 7.5* 7.8*   HCT 24.4* 25.3*    247     Recent Labs     06/08/22  0351 06/07/22  0330 06/06/22  0350   NA 146* 148* 145   K 4.0 3.8 3.9   * 118* 114*   CO2 23 22 20*   BUN 48* 42* 37*   CREA 1.14* 1.00 1.08*   * 108* 229*   CA 8.1* 9.1 9.3   MG 2.3  --  2.1   PHOS 2.2*  --  2.6  2.5*     Recent Labs     06/08/22  0351 06/07/22  0330 06/06/22  0350   ALT 99* 73 47   * 137* 129*   TBILI 0.6 0.7 0.6   TP 7.3 7.3 7.8   ALB 2.1* 2.4* 2.7*   GLOB 5.2* 4.9* 5.1*     No results for input(s): INR, PTP, APTT, INREXT, INREXT in the last 72 hours. No results for input(s): FE, TIBC, PSAT, FERR in the last 72 hours. No results found for: FOL, RBCF   Recent Labs     06/06/22  0230   PH 7.39   PCO2 31*   PO2 75     No results for input(s): CPK, CKNDX, TROIQ in the last 72 hours.     No lab exists for component: CPKMB  Lab Results   Component Value Date/Time    Cholesterol, total 124 03/08/2022 07:40 AM    HDL Cholesterol 30 03/08/2022 07:40 AM    LDL,Direct 79 06/28/2021 12:00 AM    LDL, calculated 44.8 03/08/2022 07:40 AM    Triglyceride 202 (H) 05/16/2022 06:20 AM    CHOL/HDL Ratio 4.1 03/08/2022 07:40 AM     Lab Results   Component Value Date/Time    Glucose (POC) 244 (H) 06/08/2022 06:18 AM    Glucose (POC) 226 (H) 06/08/2022 01:48 AM    Glucose (POC) 164 (H) 06/07/2022 05:37 PM    Glucose (POC) 105 06/07/2022 11:18 AM    Glucose (POC) 85 06/07/2022 06:23 AM     Lab Results   Component Value Date/Time    Color Yellow/Straw 05/31/2022 05:56 AM    Appearance Turbid (A) 05/31/2022 05:56 AM    Specific gravity 1.006 05/31/2022 05:56 AM    pH (UA) 5.0 05/31/2022 05:56 AM    Protein 30 (A) 05/31/2022 05:56 AM    Glucose Negative 05/31/2022 05:56 AM    Ketone Negative 05/31/2022 05:56 AM    Bilirubin Negative 05/31/2022 05:56 AM    Urobilinogen 0.1 05/31/2022 05:56 AM    Nitrites Negative 05/31/2022 05:56 AM    Leukocyte Esterase Large (A) 05/31/2022 05:56 AM    Bacteria Negative 05/31/2022 05:56 AM    WBC >100 (H) 05/31/2022 05:56 AM    RBC  05/31/2022 05:56 AM         Assessment:   Acute respiratory failure extubated   sacral decubitus ulcer postdebridement  Sepsis with leukocytosis elevated procalcitonin and CRP  Left foot wound  Recent removal of left great toe and second toe  CVA with PEG tube placement  History of aspiration pneumonia  History of chronic kidney disease  CAD with ejection fraction about 20 to 25%  Hypertension  Hyperlipidemia  Anemia of chronic disease  Hyperkalemia  Static post transfusion  Uncontrolled diabetes  Hepatic shock/improving  Coagulopathy  Elevated  Troponin  Bacteremia with coagulase-negative Staphylococcus  Sacral wound secondary to Acinetobacter  and Enterococcus  Procedure:  1. Left transmetatarsal amputation. 2.  Sacral wound excisional wound debridement 13 x 15 cm to the bone.     Hypotension on Levophed  Hypokalemia/replace potassium  Severe protein calorie malnutrition  Plan:       Unasyn 3 g IV every 8 hours  Aspirin 81 mg daily  Questran 4 g twice a day  Lovenox 40 subcu daily  Pepcid 20 bid twice a day  Ferrous sulfate 300 mg twice a day  Flucanazole 200 mg daily  Lasix 40 mg every 12 hours  Gabapentin 200 mg twice a day  Robinul 0.1 mg 3 times a day  Hydralazine 12.5 mg 3 times a day  Lantus 10 units subcu at bedtime  Lantus 50 units subcu in the morning  Lopressor 12.5 mg twice a day  Entresto 24/26 1 tab twice daily  Vancomycin with pharmacy protocol  Repeat  the labs overall prognosis very poor    Detailed discussion with the patient's daughter about poor prognosis   At bedside  laparoscopic loop colostomy for fecal diversion canceled    Monitor H&H    Continue current medications    Discussed with ICU nurse      Had family meeting with patient's daughter and nephew and ICU  nursing director    Discussed about comfort care and hospice/waiting for decision     Current Facility-Administered Medications:     Vancomycin random level to be drawn on 6/8/22 at 0400 am., , Other, Casandra Velasquez MD    furosemide (LASIX) injection 40 mg, 40 mg, IntraVENous, Q12H, Tone Anderson Bong Baker MD, 40 mg at 06/07/22 2114    glycopyrrolate (ROBINUL) injection 0.1 mg, 0.1 mg, IntraVENous, Q6H, Angelic Salinas MD, 0.1 mg at 06/08/22 0431    ferrous sulfate 300 mg (60 mg iron)/5 mL oral syrup 300 mg, 300 mg, Per NG tube, BID WITH MEALS, Israel Soto MD, 300 mg at 06/07/22 1621    metoprolol tartrate (LOPRESSOR) tablet 12.5 mg, 12.5 mg, Per NG tube, BID, Israel Soto MD, 12.5 mg at 06/07/22 2115    alteplase (CATHFLO) injection 2 mg, 2 mg, InterCATHeter, PRN, Leon Jon MD, 2 mg at 06/03/22 1708    fluconazole (DIFLUCAN) 200mg/100 mL IVPB (premix), 200 mg, IntraVENous, Q24H, Minh Mensah MD, Last Rate: 100 mL/hr at 06/07/22 0843, 200 mg at 06/07/22 0843    aspirin chewable tablet 81 mg, 81 mg, Per G Tube, DAILY, Israel Soto MD, 81 mg at 06/07/22 0845    NOREPINephrine (LEVOPHED) 8 mg in 0.9% NS 250ml infusion, 0.5-16 mcg/min, IntraVENous, TITRATE, Israel Soto MD, Paused at 05/29/22 2046    sodium chloride (NS) flush 5-40 mL, 5-40 mL, IntraVENous, Q8H, Christopher Matthew MD, 10 mL at 06/08/22 0628    sodium chloride (NS) flush 5-40 mL, 5-40 mL, IntraVENous, PRN, Rina Goff MD, 10 mL at 05/31/22 2213    [DISCONTINUED] ondansetron (ZOFRAN ODT) tablet 4 mg, 4 mg, Oral, Q8H PRN **OR** ondansetron (ZOFRAN) injection 4 mg, 4 mg, IntraVENous, Q6H PRN, PaulinoFernando MD    enoxaparin (LOVENOX) injection 40 mg, 40 mg, SubCUTAneous, DAILY, Fernando Bob MD, 40 mg at 06/07/22 0843    insulin lispro (HUMALOG) injection, , SubCUTAneous, Q6H, Fernando Bob MD, 2 Units at 06/08/22 0973    glucose chewable tablet 16 g, 4 Tablet, Oral, PRN, Fernando Bob MD    glucagon [facility]) injection 1 mg, 1 mg, IntraMUSCular, PRN, Fernando Bob MD    hydrALAZINE (APRESOLINE) tablet 12.5 mg, 12.5 mg, Oral, TID, Fernando Bob MD, 12.5 mg at 06/07/22 1621    sacubitriL-valsartan (ENTRESTO) 24-26 mg tablet 1 Tablet, 1 Tablet, Oral, Q12H, Fernando Bob MD, 1 Tablet at 06/07/22 2115    ampicillin-sulbactam (UNASYN) 3 g in 0.9% sodium chloride (MBP/ADV) 100 mL MBP, 3 g, IntraVENous, Q8H, Yonathan Bob MD, Last Rate: 200 mL/hr at 06/08/22 0628, 3 g at 06/08/22 0628    dextrose 10% infusion 0-250 mL, 0-250 mL, IntraVENous, PRN, Yonathan Bob MD, 125 mL at 05/19/22 0537    insulin glargine (LANTUS) injection 10 Units, 10 Units, SubCUTAneous, QHS, Yonathan Bob MD, 10 Units at 06/07/22 2114    propofol (DIPRIVAN) 10 mg/mL infusion, 0-50 mcg/kg/min, IntraVENous, TITRATE, Yonathan Bob MD, Last Rate: 9.5 mL/hr at 06/03/22 0051, 20 mcg/kg/min at 06/03/22 0051    0.9% sodium chloride infusion 250 mL, 250 mL, IntraVENous, PRN, Yonathan Bob MD, Last Rate: 15 mL/hr at 05/22/22 0820, Rate Verify at 05/22/22 0820    Vancomycin Pharmacy Dosing, , Other, Rx Dosing/Monitoring, Yonathan Bob MD    acetaminophen (TYLENOL) tablet 650 mg, 650 mg, Oral, Q6H PRN, 650 mg at 05/22/22 0556 **OR** acetaminophen (TYLENOL) suppository 650 mg, 650 mg, Rectal, Q6H PRN, Yonathan Bob MD, 650 mg at 05/16/22 2223    polyethylene glycol (MIRALAX) packet 17 g, 17 g, Oral, DAILY PRN, Yonathan Bob MD    ondansetron (ZOFRAN ODT) tablet 4 mg, 4 mg, Oral, Q8H PRN **OR** [DISCONTINUED] ondansetron (ZOFRAN) injection 4 mg, 4 mg, IntraVENous, Q6H PRN, Yonathan Bob MD    famotidine (PEPCID) tablet 20 mg, 20 mg, Oral, BID, Yonathan Bob MD, 20 mg at 06/07/22 2115    acidophilus-pectin, citrus tablet 2 Tablet, 2 Tablet, Oral, DAILY, Yonathan Bob MD, 2 Tablet at 06/07/22 0845    albuterol-ipratropium (DUO-NEB) 2.5 MG-0.5 MG/3 ML, 3 mL, Nebulization, Q6H PRN, Yonathan Bob MD    artificial saliva (MOUTH KOTE) 1 Spray, 1 Spray, Oral, TID, Yonathan Bob MD, 1 Coleman at 06/07/22 2115    brimonidine (ALPHAGAN) 0.2 % ophthalmic solution 1 Drop, 1 Drop, Both Eyes, TID, Yonathan Bob MD, 1 Drop at 06/07/22 2116    cholestyramine-aspartame (QUESTRAN LIGHT) packet 4 g, 4 g, Oral, BID WITH MEALS, Sabrina Bob MD, 4 g at 06/07/22 1622    [Held by provider] fenofibrate nanocrystallized (TRICOR) tablet 48 mg, 48 mg, Oral, DAILY, Sabrina Bob MD, 48 mg at 05/19/22 5539    gabapentin (NEURONTIN) capsule 300 mg, 300 mg, Oral, BID, Sabrina Bob MD, 300 mg at 06/07/22 2114    insulin glargine (LANTUS) injection 50 Units, 50 Units, SubCUTAneous, DAILY, Sabrina Bob MD, 50 Units at 06/06/22 0849    latanoprost (XALATAN) 0.005 % ophthalmic solution 1 Drop, 1 Drop, Right Eye, QPM, Sabrina Bob MD, 1 Drop at 06/07/22 1720    traMADoL (ULTRAM) tablet 25 mg, 25 mg, Oral, Q12H PRN, Sabrina Bob MD, 25 mg at 06/03/22 1717

## 2022-06-08 NOTE — PROGRESS NOTES
Met w/patient's daughter Jamal Hollingsworth. Inquired if the family has made a decision regarding which hospice company, they'd like a referral sent too. [facility] called her sister Merline Outlaw, and a phone discussion took place. Informed writer made aware of family's decision for home w/hospice. Inquired if they'd decided on which hospice company for referral to be sent too. Per Joao Niraj didn't say when we wanted hospice to start. Leave a list of hospice companies with my sister. \"  Acknowledged understanding. Explained a referral has to be sent as  everything has to go through [employer]. Provided hospice listing to [facility]. Family to meet to discuss which hospice company for referral to be sent too. Will follow up.         Pierce Cooper, MSW

## 2022-06-08 NOTE — PROGRESS NOTES
Problem: Falls - Risk of  Goal: *Absence of Falls  Description: Document Gene De Anda Fall Risk and appropriate interventions in the flowsheet. Outcome: Progressing Towards Goal  Note: Fall Risk Interventions:  Mobility Interventions: Bed/chair exit alarm    Mentation Interventions: Bed/chair exit alarm,Adequate sleep, hydration, pain control,Evaluate medications/consider consulting pharmacy    Medication Interventions: Bed/chair exit alarm,Evaluate medications/consider consulting pharmacy    Elimination Interventions: Bed/chair exit alarm,Call light in reach              Problem: Patient Education: Go to Patient Education Activity  Goal: Patient/Family Education  Outcome: Progressing Towards Goal     Problem: Pressure Injury - Risk of  Goal: *Prevention of pressure injury  Description: Document Shakir Scale and appropriate interventions in the flowsheet. Outcome: Progressing Towards Goal  Note: Pressure Injury Interventions:  Sensory Interventions: Keep linens dry and wrinkle-free,Maintain/enhance activity level,Float heels,Pressure redistribution bed/mattress (bed type),Turn and reposition approx. every two hours (pillows and wedges if needed),Monitor skin under medical devices    Moisture Interventions: Apply protective barrier, creams and emollients,Internal/External urinary devices,Internal/External fecal devices,Maintain skin hydration (lotion/cream),Minimize layers,Moisture barrier    Activity Interventions: Pressure redistribution bed/mattress(bed type),PT/OT evaluation    Mobility Interventions: Assess need for specialty bed,Pressure redistribution bed/mattress (bed type),Float heels,Turn and reposition approx.  every two hours(pillow and wedges)    Nutrition Interventions: Document food/fluid/supplement intake    Friction and Shear Interventions: Apply protective barrier, creams and emollients,Foam dressings/transparent film/skin sealants,Minimize layers                Problem: Patient Education: Go to Patient Education Activity  Goal: Patient/Family Education  Outcome: Progressing Towards Goal     Problem: Impaired Skin Integrity/Pressure Injury Treatment  Goal: *Improvement of Existing Pressure Injury  Outcome: Progressing Towards Goal  Goal: *Prevention of pressure injury  Description: Document Shakir Scale and appropriate interventions in the flowsheet. Outcome: Progressing Towards Goal  Note: Pressure Injury Interventions:  Sensory Interventions: Keep linens dry and wrinkle-free,Maintain/enhance activity level,Float heels,Pressure redistribution bed/mattress (bed type),Turn and reposition approx. every two hours (pillows and wedges if needed),Monitor skin under medical devices    Moisture Interventions: Apply protective barrier, creams and emollients,Internal/External urinary devices,Internal/External fecal devices,Maintain skin hydration (lotion/cream),Minimize layers,Moisture barrier    Activity Interventions: Pressure redistribution bed/mattress(bed type),PT/OT evaluation    Mobility Interventions: Assess need for specialty bed,Pressure redistribution bed/mattress (bed type),Float heels,Turn and reposition approx. every two hours(pillow and wedges)    Nutrition Interventions: Document food/fluid/supplement intake    Friction and Shear Interventions: Apply protective barrier, creams and emollients,Foam dressings/transparent film/skin sealants,Minimize layers                Problem: Patient Education: Go to Patient Education Activity  Goal: Patient/Family Education  Outcome: Progressing Towards Goal     Problem: Diabetes Self-Management  Goal: *Disease process and treatment process  Description: Define diabetes and identify own type of diabetes; list 3 options for treating diabetes. Outcome: Progressing Towards Goal  Goal: *Incorporating nutritional management into lifestyle  Description: Describe effect of type, amount and timing of food on blood glucose; list 3 methods for planning meals.   Outcome: Progressing Towards Goal  Goal: *Incorporating physical activity into lifestyle  Description: State effect of exercise on blood glucose levels. Outcome: Progressing Towards Goal  Goal: *Developing strategies to promote health/change behavior  Description: Define the ABC's of diabetes; identify appropriate screenings, schedule and personal plan for screenings. Outcome: Progressing Towards Goal  Goal: *Using medications safely  Description: State effect of diabetes medications on diabetes; name diabetes medication taking, action and side effects. Outcome: Progressing Towards Goal  Goal: *Monitoring blood glucose, interpreting and using results  Description: Identify recommended blood glucose targets  and personal targets. Outcome: Progressing Towards Goal  Goal: *Prevention, detection, treatment of acute complications  Description: List symptoms of hyper- and hypoglycemia; describe how to treat low blood sugar and actions for lowering  high blood glucose level. Outcome: Progressing Towards Goal  Goal: *Prevention, detection and treatment of chronic complications  Description: Define the natural course of diabetes and describe the relationship of blood glucose levels to long term complications of diabetes.   Outcome: Progressing Towards Goal  Goal: *Developing strategies to address psychosocial issues  Description: Describe feelings about living with diabetes; identify support needed and support network  Outcome: Progressing Towards Goal  Goal: *Insulin pump training  Outcome: Progressing Towards Goal  Goal: *Sick day guidelines  Outcome: Progressing Towards Goal  Goal: *Patient Specific Goal (EDIT GOAL, INSERT TEXT)  Outcome: Progressing Towards Goal     Problem: Patient Education: Go to Patient Education Activity  Goal: Patient/Family Education  Outcome: Progressing Towards Goal     Problem: Risk for Spread of Infection  Goal: Prevent transmission of infectious organism to others  Description: Prevent the transmission of infectious organisms to other patients, staff members, and visitors.   Outcome: Progressing Towards Goal     Problem: Patient Education:  Go to Education Activity  Goal: Patient/Family Education  Outcome: Progressing Towards Goal     Problem: Discharge Planning  Goal: *Discharge to safe environment  Outcome: Progressing Towards Goal  Goal: *Knowledge of medication management  Outcome: Progressing Towards Goal  Goal: *Knowledge of discharge instructions  Outcome: Progressing Towards Goal     Problem: Patient Education: Go to Patient Education Activity  Goal: Patient/Family Education  Outcome: Progressing Towards Goal

## 2022-06-08 NOTE — PROGRESS NOTES
The purpose of the visit was in response to a staff referral to offer care for the family of the patient. The patient was intubated and did not share during the visit, however two of her children were present at the bedside. The family had earlier had a family meeting with the med-team concerning next steps in plan of care. The family appeared to be saddened, as they had been tearful, considering the impasse they have come to. They shared sadly that their mother is going home for hospice care. They mentioned respecting her wishes not to be in a facility. They shared how she spoke of being with her mother (who has ), and with that they find peace. They shared that they were accepting that there mother was transitioning and though they were hurting and struggling with the reality, they are comforted by God. They mentioned that their mother spoke openly of her lesley, and that she always trusted God. The family shared that they were grateful to have the support of one another, and the assurance from their lesley in God. The  listened empathically, explored painful feelings, encouraged ongoing supportive family presence, facilitated story-telling, provided the ministry of pastoral care, and the reassurance of prayer. [address] [facility] Viviane Tillman.    can be reached by calling the  at [facility]  [phone]

## 2022-06-08 NOTE — PROGRESS NOTES
IMPRESSION:   1. Acute hypoxic respiratory failure extubated patient was put on BiPAP machine as her condition got worse  2. Pulmonary edema mild  3. Aspiration chronically  4. Sacral decubiti ulcer with Acinetobacter and Enterococcus  5. Severe sepsis with septic shock resolved on no pressors  6. Left foot wound  7. Candidal UTI  8. Transaminitis   9. Shock liver resolved  10. Hypokalemia repleted  11. Symptomatic anemia stable after transfusion  12. Cardiomyopathy ejection fraction 50 to 55%  13. Mild pulmonary edema      RECOMMENDATIONS/PLAN:   1. ICU monitoring  1. Extubated on 6/3 worsening tachypnea she was placed on noninvasive ventilator likely recurrent aspiration and fluid overload, will remove BiPAP and keep it at night and during the daytime will start patient on nasal cannula but she is still desaturate quickly continue on high flow nasal cannula  2. Continue with Lasix  3. Drop in hemoglobin we will continue to follow  4. New onset cardiomyopathy with the Lasix chest x-ray shows improvement in the pleural effusion  5. Persistent infection on Unasyn and vancomycin  6. Chest x-ray shows worsening of the congestion  7. Patient underwent on 5/23 left metatarsal amputation and sacral wound debridement  8. Severe sepsis with decubiti ulcer   9. Patient is on Unasyn  10.  Chest x-ray shows mild congestive changes with fluid in the minor fissure     [x] High complexity decision making was performed  [x] See my orders for details  HPI   24-year-old lady came in because of leg pain past medical history of hypertension diabetes mellitus peripheral vascular disease CVA she is bedbound she has leg wound and also has sacral decubiti ulcer and she was scheduled for laparoscopic colostomy and sacral wound debridement and amputation of the left tarsometatarsal because of wound infection but her condition got worse her liver enzyme was elevated INR was also elevated she was intubated transferred to ICU was getting vancomycin and Unasyn started on Levophed because of low blood pressure. PMH:  has a past medical history of Anemia, Chronic kidney disease, Diabetes mellitus ([address] Hemiplegia affecting dominant side, post-stroke ([facility] [address] (05/04/2022), HTN (hypertension), Hyperkalemia, Hyperlipidemia, Ill-defined condition, Neuropathy, PAD (peripheral artery disease) ([address] Sciatica, Stroke ([address] and UTI (urinary tract infection). PSH:   has a past surgical history that includes hx other surgical (Bilateral); hx amputation (Right); hx other surgical; hx cervical fusion; hx vascular stent (Bilateral); hx vascular stent (Bilateral); hx gi; and ir insert non tunl cvc over 5 yrs (5/27/2022). FHX: family history includes Cancer in her mother; Diabetes in her mother; Hypertension in her mother. SHX:  reports that she has never smoked. She has never used smokeless tobacco. She reports previous alcohol use. She reports that she does not use drugs.     ALL: No Known Allergies     MEDS:   [x] Reviewed - As Below   [] Not reviewed    Current Facility-Administered Medications   Medication    Vancomycin random level to be drawn on 6/8/22 at 0400 am.    furosemide (LASIX) injection 40 mg    glycopyrrolate (ROBINUL) injection 0.1 mg    ferrous sulfate 300 mg (60 mg iron)/5 mL oral syrup 300 mg    metoprolol tartrate (LOPRESSOR) tablet 12.5 mg    alteplase (CATHFLO) injection 2 mg    fluconazole (DIFLUCAN) 200mg/100 mL IVPB (premix)    aspirin chewable tablet 81 mg    NOREPINephrine (LEVOPHED) 8 mg in 0.9% NS 250ml infusion    sodium chloride (NS) flush 5-40 mL    sodium chloride (NS) flush 5-40 mL    ondansetron (ZOFRAN) injection 4 mg    enoxaparin (LOVENOX) injection 40 mg    insulin lispro (HUMALOG) injection    glucose chewable tablet 16 g    glucagon (GLUCAGEN) injection 1 mg    hydrALAZINE (APRESOLINE) tablet 12.5 mg    sacubitriL-valsartan (ENTRESTO) 24-26 mg tablet 1 Tablet    ampicillin-sulbactam (UNASYN) 3 g in 0.9% sodium chloride (MBP/ADV) 100 mL MBP    dextrose 10% infusion 0-250 mL    insulin glargine (LANTUS) injection 10 Units    propofol (DIPRIVAN) 10 mg/mL infusion    0.9% sodium chloride infusion 250 mL    Vancomycin Pharmacy Dosing    acetaminophen (TYLENOL) tablet 650 mg    Or    acetaminophen (TYLENOL) suppository 650 mg    polyethylene glycol (MIRALAX) packet 17 g    ondansetron (ZOFRAN ODT) tablet 4 mg    famotidine (PEPCID) tablet 20 mg    acidophilus-pectin, citrus tablet 2 Tablet    albuterol-ipratropium (DUO-NEB) 2.5 MG-0.5 MG/3 ML    artificial saliva (MOUTH KOTE) 1 Spray    brimonidine (ALPHAGAN) 0.2 % ophthalmic solution 1 Drop    cholestyramine-aspartame (QUESTRAN LIGHT) packet 4 g    [Held by provider] fenofibrate nanocrystallized (TRICOR) tablet 48 mg    gabapentin (NEURONTIN) capsule 300 mg    insulin glargine (LANTUS) injection 50 Units    latanoprost (XALATAN) 0.005 % ophthalmic solution 1 Drop    traMADoL (ULTRAM) tablet 25 mg      MAR reviewed and pertinent medications noted or modified as needed   Current Facility-Administered Medications   Medication    Vancomycin random level to be drawn on 6/8/22 at 0400 am.    furosemide (LASIX) injection 40 mg    glycopyrrolate (ROBINUL) injection 0.1 mg    ferrous sulfate 300 mg (60 mg iron)/5 mL oral syrup 300 mg    metoprolol tartrate (LOPRESSOR) tablet 12.5 mg    alteplase (CATHFLO) injection 2 mg    fluconazole (DIFLUCAN) 200mg/100 mL IVPB (premix)    aspirin chewable tablet 81 mg    NOREPINephrine (LEVOPHED) 8 mg in 0.9% NS 250ml infusion    sodium chloride (NS) flush 5-40 mL    sodium chloride (NS) flush 5-40 mL    ondansetron (ZOFRAN) injection 4 mg    enoxaparin (LOVENOX) injection 40 mg    insulin lispro (HUMALOG) injection    glucose chewable tablet 16 g    glucagon (GLUCAGEN) injection 1 mg    hydrALAZINE (APRESOLINE) tablet 12.5 mg    sacubitriL-valsartan (ENTRESTO) 24-26 mg tablet 1 Tablet    ampicillin-sulbactam (UNASYN) 3 g in 0.9% sodium chloride (MBP/ADV) 100 mL MBP    dextrose 10% infusion 0-250 mL    insulin glargine (LANTUS) injection 10 Units    propofol (DIPRIVAN) 10 mg/mL infusion    0.9% sodium chloride infusion 250 mL    Vancomycin Pharmacy Dosing    acetaminophen (TYLENOL) tablet 650 mg    Or    acetaminophen (TYLENOL) suppository 650 mg    polyethylene glycol (MIRALAX) packet 17 g    ondansetron (ZOFRAN ODT) tablet 4 mg    famotidine (PEPCID) tablet 20 mg    acidophilus-pectin, citrus tablet 2 Tablet    albuterol-ipratropium (DUO-NEB) 2.5 MG-0.5 MG/3 ML    artificial saliva (MOUTH KOTE) 1 Spray    brimonidine (ALPHAGAN) 0.2 % ophthalmic solution 1 Drop    cholestyramine-aspartame (QUESTRAN LIGHT) packet 4 g    [Held by provider] fenofibrate nanocrystallized (TRICOR) tablet 48 mg    gabapentin (NEURONTIN) capsule 300 mg    insulin glargine (LANTUS) injection 50 Units    latanoprost (XALATAN) 0.005 % ophthalmic solution 1 Drop    traMADoL (ULTRAM) tablet 25 mg      PMH:  has a past medical history of Anemia, Chronic kidney disease, Diabetes mellitus ([address] Hemiplegia affecting dominant side, post-stroke ([address] (05/04/2022), HTN (hypertension), Hyperkalemia, Hyperlipidemia, Ill-defined condition, Neuropathy, PAD (peripheral artery disease) ([address] Sciatica, Stroke ([address] and UTI (urinary tract infection). PSH:   has a past surgical history that includes hx other surgical (Bilateral); hx amputation (Right); hx other surgical; hx cervical fusion; hx vascular stent (Bilateral); hx vascular stent (Bilateral); hx gi; and ir insert non tunl cvc over 5 yrs (5/27/2022). FHX: family history includes Cancer in her mother; Diabetes in her mother; Hypertension in her mother. SHX:  reports that she has never smoked. She has never used smokeless tobacco. She reports previous alcohol use. She reports that she does not use drugs.      ROS:  Unable to obtain intubated on ventilator    Hemodynamics:    CO:    CI:    CVP:    SVR:   PAP Systolic:    PAP Diastolic:    PVR:    VF71:        Ventilator Settings:      Mode Rate TV Press PEEP FiO2 PIP Min. Vent   Pressure support,Spontaneous    500 ml 15 cm H2O  5 cm H20 30 %  27 cm H2O  10.7 l/min        Vital Signs: Telemetry:    normal sinus rhythm Intake/Output:   Visit Vitals  BP (!) 147/69 (BP 1 Location: Left upper arm, BP Patient Position: At rest)   Pulse 88   Temp 99 °F (37.2 °C)   Resp 27   Ht 5' 6.93\" (1.7 m)   Wt 95.1 kg (209 lb 10.5 oz)   SpO2 100%   Breastfeeding No   BMI 32.91 kg/m²       Temp (24hrs), Av.6 °F (37.6 °C), Min:99 °F (37.2 °C), Max:100.1 °F (37.8 °C)        O2 Device: BIPAP O2 Flow Rate (L/min): 3 l/min       Wt Readings from Last 4 Encounters:   22 95.1 kg (209 lb 10.5 oz)   04/10/22 86.6 kg (191 lb)   22 82.2 kg (181 lb 3.5 oz)   20 84.4 kg (186 lb)          Intake/Output Summary (Last 24 hours) at 2022 0812  Last data filed at 2022 0420  Gross per 24 hour   Intake 2565 ml   Output 2330 ml   Net 235 ml       Last shift:      No intake/output data recorded. Last 3 shifts:  1901 -  0700  In: 3525 [I.V.:400]  Out: 3330 [Urine:3050; Drains:280]       Physical Exam:     General: On noninvasive ventilator weakly tries to open her eyes follows no commands  HEENT: NCAT,  Eyes: anicteric; conjunctiva clear, random eye movement when eyelids are open  Neck: no nodes, , trach midline; no accessory MM use. Neck veins obscured by obesity but seems slightly engorged  Chest: no deformity,   Cardiac: R regular; no murmur;   Lungs: Shallow breaths with diffuse wheezes and rhonchi  Abd: soft, NT, hypoactive BS  Ext: Diffuse edema; no joint swelling;  No clubbing  :clear urine  Neuro: Seems to weakly try to open her eyes follows no commands does not speak does not move her extremities  Psych-  unable to assess  Skin: warm, dry, no cyanosis;   Pulses: Brachial and radial pulses intact  Capillary: Normal capillary refill      DATA:    MAR reviewed and pertinent medications noted or modified as needed  MEDS:   Current Facility-Administered Medications   Medication    Vancomycin random level to be drawn on 6/8/22 at 0400 am.    furosemide (LASIX) injection 40 mg    glycopyrrolate (ROBINUL) injection 0.1 mg    ferrous sulfate 300 mg (60 mg iron)/5 mL oral syrup 300 mg    metoprolol tartrate (LOPRESSOR) tablet 12.5 mg    alteplase (CATHFLO) injection 2 mg    fluconazole (DIFLUCAN) 200mg/100 mL IVPB (premix)    aspirin chewable tablet 81 mg    NOREPINephrine (LEVOPHED) 8 mg in 0.9% NS 250ml infusion    sodium chloride (NS) flush 5-40 mL    sodium chloride (NS) flush 5-40 mL    ondansetron (ZOFRAN) injection 4 mg    enoxaparin (LOVENOX) injection 40 mg    insulin lispro (HUMALOG) injection    glucose chewable tablet 16 g    glucagon (GLUCAGEN) injection 1 mg    hydrALAZINE (APRESOLINE) tablet 12.5 mg    sacubitriL-valsartan (ENTRESTO) 24-26 mg tablet 1 Tablet    ampicillin-sulbactam (UNASYN) 3 g in 0.9% sodium chloride (MBP/ADV) 100 mL MBP    dextrose 10% infusion 0-250 mL    insulin glargine (LANTUS) injection 10 Units    propofol (DIPRIVAN) 10 mg/mL infusion    0.9% sodium chloride infusion 250 mL    Vancomycin Pharmacy Dosing    acetaminophen (TYLENOL) tablet 650 mg    Or    acetaminophen (TYLENOL) suppository 650 mg    polyethylene glycol (MIRALAX) packet 17 g    ondansetron (ZOFRAN ODT) tablet 4 mg    famotidine (PEPCID) tablet 20 mg    acidophilus-pectin, citrus tablet 2 Tablet    albuterol-ipratropium (DUO-NEB) 2.5 MG-0.5 MG/3 ML    artificial saliva (MOUTH KOTE) 1 Spray    brimonidine (ALPHAGAN) 0.2 % ophthalmic solution 1 Drop    cholestyramine-aspartame (QUESTRAN LIGHT) packet 4 g    [Held by provider] fenofibrate nanocrystallized (TRICOR) tablet 48 mg    gabapentin (NEURONTIN) capsule 300 mg    insulin glargine (LANTUS) injection 50 Units    latanoprost (XALATAN) 0.005 % ophthalmic solution 1 Drop    traMADoL (ULTRAM) tablet 25 mg        Labs:    Recent Labs     22  0351 22  0330 22  0350   WBC 12.8* 15.0* 12.5*   HGB 7.5* 7.8* 7.9*    247 263     Recent Labs     22  0351 22  0330 22  0350   * 148* 145   K 4.0 3.8 3.9   * 118* 114*   CO2 23 22 20*   * 108* 229*   BUN 48* 42* 37*   CREA 1.14* 1.00 1.08*   CA 8.1* 9.1 9.3   MG 2.3  --  2.1   PHOS 2.2*  --  2.6  2.5*   ALB 2.1* 2.4* 2.7*   ALT 99* 73 47     Recent Labs     22  0230   PH 7.39   PCO2 31*   PO2 75   HCO3 20*   FIO2 30.0    and IV IP 16 EP 5 rate 16 FiO2 30%    AC 12  PEEP 5 FiO2 30%   echo ejection fraction 20% mild mitral regurg left atrium 3.5 cm RVSP 35  , 1313, 1361    Lab Results   Component Value Date/Time    Culture result: Heavy  Mixed skin yasmine isolated   2022 06:15 PM    Culture result: Rare Normal respiratory yasmine 2022 02:48 PM    Culture result: Heavy Acinetobacter baumannii complex 05/15/2022 07:15 PM    Culture result: Heavy Diphtheroids 05/15/2022 07:15 PM     Lab Results   Component Value Date/Time    TSH 2.880 2016 10:13 AM        Imagin/22 chest x-ray with ET tube in place hazy accentuated basilar markings with small amount of fluid in the minor fissure  Results from Hospital Encounter encounter on 22    XR CHEST PORT    Narrative  Chest single view. Comparison single view chest 2022. Stable right CVC. Asymmetric left greater than right central interstitial edema perhaps mildly  reduced compared to prior imaging. Cardiac and mediastinal structures unchanged. No pneumothorax or sizable pleural effusion.       Results from [place] encounter on 05/14/22    CT ABD PELV WO CONT    Narrative  CT ABD PELV WO CONT    Technique: Noncontrasted CT scan of the abdomen and pelvis was performed  utilizing transaxial images obtained from the dome of the diaphragm to the pubic  symphysis. Coronal and sagittal reconstructions were generated. Dose Reduction:  All CT scans at this facility are performed using dose reduction optimization  techniques as appropriate to a performed exam including the following: Automated  exposure control, adjustments of the mA and/or kV according to patient size, or  use of iterative reconstruction technique. Comparison:  2/25/2022. Findings:  Small-moderate pleural effusions are present with bibasilar  atelectasis. Atherosclerotic calcifications are again evident including coronary  artery calcifications. Gastrostomy tube tip in the gastric antrum. The liver, spleen, pancreas, and  adrenal glands are unremarkable for noncontrasted technique. Stable lobulated  kidneys. Negative for hydronephrosis. Gallbladder is unremarkable. No biliary  duct dilatation apparent. The abdominal aorta is normal in caliber. There is no evidence of bowel obstruction. Small volume ascites has developed. The urinary bladder is decompressed by a Bell catheter. The uterus is  unremarkable. The appendix is normal.    There is mild body wall edema. No suspicious lytic or blastic lesion evident. Impression  Third spacing of fluids with small volume ascites, small-moderate  pleural effusions, and mild body wall edema. Bibasilar atelectasis. 5/20 intubated on ventilator we will continue current vent settings patient is supposed to go for surgery because of transaminitis elevated liver enzyme we will wait as per surgeon for sacral wound debridement and diverting loop colostomy off Levophed, improvement in AST and ALT  5/21 continue with the current vent settings ABG acceptable liver enzyme improving awaiting for the surgery  5/22 maintain ventilator as is in anticipation of surgery.   Renal function improved mild congestion on chest x-ray we will give 1 dose Lasix and potassium  5/23 patient is going for surgery today we will leave ventilator as it is INR is 1.5  5/25 patient remained on ventilator intubated doing well, patient received vitamin K fresh frozen plasma schedule for laparoscopic loop colostomy possible today  5/26 no surgery has been plans will start weaning do sedation vacation  5/27 try to do sedation vacation to wean patient blood pressure dropped still on Levophed we will continue to wean discussed with the family at bedside  5/28 remains on ventilator sedated and also on Levophed possible surgery next week  5/29 on ventilator ET tube advisement 1 to 1.5 cm we will repeat chest x-ray ABG acceptable  5/30 continue with current vent setting  5/31 awaiting for possible surgery if not we will start weaning  6/1 remain intubated on ventilator surgery has been delayed we will start weaning and plan for extubation  6/2 no surgery was planned so we will try to wean she is off Levophed we will do sedation vacation and if weaning cardio acceptable will extubate  6/3 change vent settings to spontaneous if weaning creatinine acceptable will extubate  6/4 extubated on 6/3 now on nasal cannula tolerating well  6/5 respiratory distress last night placed on noninvasive ventilator chest x-ray continues to show fluid overload. Will increase Lasix to 40 mg twice daily give albumin today. Likely has continued aspiration.   Will increase Robinul to every 6 hours dosing  6/6 patient is alert awake he is full code we will try to wean BiPAP put on nasal cannula and keep BiPAP at night  6/7 noninvasive ventilator Machine we will change it to high flow nasal cannula  6/8 on noninvasive ventilator BiPAP machine chest x-ray shows improvement overall condition prognosis poor  Time of care 30 minutes

## 2022-06-08 NOTE — PROGRESS NOTES
Progress Note    Patient: Oliverio Pablo MRN: [id number]  SSN: [id number]    YOB: 1945  Age: 78 y.o. Sex: female      Admit Date: 5/14/2022    LOS: 25 days     Subjective:   Patient followed for sacral wound infection with repeat culture growing Acinetobacter and Enterococci. Left foot wound also grew Acinetobacter, now status post left TMA. WBC now decreasing along with CRP and procal.  Urinalysis showing marked pyuria and yeasts for which she is on Fluconazole. She is currently on Vancomycin and Unasyn. Patient has now been extubated. She is awake but nonverbal.  CXR unchanged today. Family member at bedside states that they had a meeting earlier today and decision was made to place her on hospice and taken home later this week. Objective:     Vitals:    06/08/22 0700 06/08/22 0814 06/08/22 1000 06/08/22 1028   BP:   (!) 151/61 (!) 151/61   Pulse:   79 76   Resp:   (!) 33    Temp: 99 °F (37.2 °C)      SpO2:  100% 100%    Weight:       Height:            Intake and Output:  Current Shift: No intake/output data recorded. Last three shifts: 06/06 1901 - 06/08 0700  In: 3525 [I.V.:400]  Out: 3491 [Urine:3050; Drains:280]    Physical Exam:    Vitals and nursing note reviewed. Constitutional:       General: She is not in acute distress. Appearance: She is ill-appearing. HENT: Nasal O2 cannula    Eyes: open        Cardiovascular:      Rate and Rhythm: Normal rate and regular rhythm. Heart sounds: No murmur heard. Pulmonary: clear bilaterally    Abdominal:      General: Bowel sounds are normal.      Palpations: Abdomen is soft. Tenderness: There is no abdominal tenderness. Comments: PEG site unremarkable today      Genitourinary:     Comments: Indwelling Bell with cloudy urine             Musculoskeletal:      Cervical back: Neck supple. Right lower leg: No edema. Left lower leg: No edema.  No     Comments: Left foot surgical wound now shows dehiscence without drainage today, distal foot is dusky  Skin:     Findings: No rash. Comments: Sacral wound unchanged  Neurological: somnolent  Psychiatric:  somnolent     Lab/Data Review:     WBC 12,800    ALT/AST 55 /14  Urinalysis with WBC >100, Yeasts, Bacteria negative    Procal 0.84 <0.64 <0.31 <0.26 <0.24 <0.31 <0.36  <0.59 <0.57  CRP 14.20 < 13.60 < 14.60 <11.40 <12.60  <18.90 <17.90    Blood cultures (5/14) Coagulase negative Staphylococci  Sacral wound (5/14) Acinetobacter baumannii sensitive to Unasyn, Cefepime, Ceftazidime and Enterococcus faecium resistant to Ampicillin  Sacral wound (5/23) Mixed skin yasmine FINAL  Left great toe (5/15) Acinetobacter baumannii  Sputum culture (5/19) Rare normal respiratory yasmine FINAL     CXR (6/7)  Asymmetric left greater than right central interstitial edema perhaps mildlyreduced compared to prior imaging. Assessment:     Active Problems:    Sacral ulcer ([address] (5/14/2022)    1. Sacral wound infection secondary now to Acinetobacter baumannii and Enterococcus faecium, on Vancomycin and Unasyn, status post excisional wound debridement to the bone, Day #19 Vancomycin and Day #16 Unasyn. 2. Sepsis with persistent leukocytosis, elevated procal and CRP  3. Left foot wound, culture growing Acinetobacter baumannii, status post TMA  4. Possible ischemic hepatitiis with transaminitis following hypotensive episode, resolving  5. Positive blood cultures for Coagulase negative Staphylococci, probable contaminant  6. Hepatitis C antibody positive, resolved (negative HCV viral level)  7. New onset cardiomyopathy  8. Candida UTI with marked pyuria and yeasts, Day #8 IV Fluconazole  9. ? Hospice planned    Comment:  WBC, CRP and procal remain elevated. Plan:   1. Continue IV Unasyn and Vancomycin (for E. Faecium) unless placed on hospice  2.  Continue Fluconazole 200 mg IV daily for 6 more days or until placed on hospice       Signed By: Nakia Freitas MD     June 8, 2022

## 2022-06-08 NOTE — CONSULTS
Comprehensive Nutrition Assessment    RD verbally consulted by RN with family asking for transition to bolus feeds in an effort to decrease frequency of stooling. Noted pt already on probiotic and Questran Light to help decrease diarrhea. Recs below, communicated to RN. Nutrition Recommendations/Plan:   1. Adjust TF via PEG of Glucerna 1.5 to 240ml BOLUS feeds, q4hrs (6x/d)  2. Flush with 75ml water after each bolus        Provide 2 pkt Doug Daily (180kcals, 5g pro)     Provides 2340 kcal (100%), 124g PRO (100%), 1544 mL H2O (97%)      2. Monitor and Record wts, TF/Water flushes rates, OPs & BMs in I/Os   3. Consider initiating Fiber Con to help with diarrhea-- Approved by attending, added by pharm     4.  Continue probiotic, Questran Light, FeSu (Consider changing to crushable tablet as medically able-- Approved by attending, added by pharm)     Estimated Daily Nutrient Needs:  Energy Requirements Based On: Kcal/kg (Stable vent x2 wk)  Weight Used for Energy Requirements:  (EDW)  Energy (kcal/day): 2000-2400kcals (25-30kcals/kg EDW bedbound vs wounds)  Weight Used for Protein Requirements: Other (specify) (EDW)  Protein (g/day): 120-136 (1.5-1.7g/kg)  Method Used for Fluid Requirements: ml/kg  Fluid (ml/day): 1600-2000ml (20-25ml/kg)    Brenda Baez  Contact: Ext [phone], or via Opt[employer]

## 2022-06-08 NOTE — PROGRESS NOTES
Vancomycin Dosing Consult  Day #24 of vancomycin therapy  Consult ordered by Dr. Sole Ghotra for this 78 y.o. female for indication of sacral wound infection, sepsis. Antibiotic regimen: Vancomycin + Unasyn  + fluconazole    Temp (24hrs), Av.5 °F (37.5 °C), Min:99 °F (37.2 °C), Max:100 °F (37.8 °C)    Recent Labs     22  0351 22  0330 22  0350   WBC 12.8* 15.0* 12.5*   CREA 1.14* 1.00 1.08*   BUN 48* 42* 37*     Est CrCl: ~55-60 mL/min; UO: 0.4 mL/kg/hr  Concomitant nephrotoxic drugs: Off vasopressors    Cultures:    Wound: Moderate MDR Pseudomonas aeruginosa susceptible to Zerbaxa, aminoglycosides), moderate mixed skin yasmine, moderate mixed enteric yasmine including yeast, final   Blood: CoNS in the anaerobic bottle, final   Wound, ulcer: Moderate Acinetobacter baumannii, moderate Enterococcus faecium, light >2 organisms (contraindicated), final  5/15 Wound, great toe: Heavy Acinetobacter baumannii complex (Zosyn resistant, susceptible to Unasyn), heavy Diphtheroids, final   Tissue: Heavy mixed skin yasmine, final    MRSA Swab: Not ordered, patient already received first dose of vancomycin    Target range: Trough 10-15 mcg/mL    Recent level history:  Date/Time Dose & Interval Measured Level (mcg/mL)    @ 0445 750 mg x 1 19.8    @ 0321 Held 15.4    @ 0400 750 mg x 1 (given ) 13.6    @ 0300 500 mg x 1 15.3    @ 0350 500 Mg X1 at 0900 11.2    @ 0443 500 mg X1 at 1100 13.6    @ 0351 500 mg X1 at 1300 14.6        Assessment/Plan:   Afebrile, continued leukocytosis, procal remain elevated  CKD, SCr =1.14  today, clearance of vancomycin remains quite slow relative to renal function  Random level is at  14.6 today.   Will give 500 mg  dose today and check a random level tomorrow AM 22  at  1000 am.  Antimicrobial stop date TBD

## 2022-06-09 NOTE — WOUND CARE
Negative Pressure    NAME:  Serafin Paige  YOB: 1945  MEDICAL RECORD NUMBER:  [id number]  DATE:  5/14/2022     Applied Negative Pressure to Sacral surgical incision wound(s)/ulcer(s).  [x] Applied skin barrier prep to leah-wound.  [x] Cut strips of plastic drape to picture frame wound so that leah-wound is     covered with the drape.  [x] If bridging dressing to less prominent site, cover any intact skin that will come in contact with the Negative Pressure Therapy sponge, gauze or channel drain with plastic drape. The sponge should never touch intact skin.  [x] Cut sponge, gauze or channel drain to size which will fit into the wound/ulcer bed without being forced.  [x] Be sure the sponge is large enough to hold the entire round plastic flange which is attached to the tubing. Never allow flange to be larger than the sponge or it will produce suction damaging intact skin.  Total number of individual pieces of foam used within the wound bed: 2; 1x contact foam and 1x cover foam.   [x] If bridging the dressing away from the primary site, be sure the bridge leads to a piece of sponge large enough to hold the entire flange without allowing any of the flange to overlap onto intact skin.  [x] Covered sponge, gauze or channel drain with plastic drape.  [x] Cut a hole in this plastic drape directly over the sponge the same size as the plastic drain tubing.  [x] Removed plastic liner from flange and apply it directly over the hole you cut.  [x] Removed the plastic cover from the flange.  [x] Attached the tubing to the wound/ulcer Negative Pressure Therapy and turn it on to be sure a vacuum is created and that there are no leaks.  [x] If air leaks occur, use plastic drape to patch them.  [] Secured Negative Pressure Therapy dressing with ace wrap loosely if located on an extremity. Maintain tubing outside of ace wrap. Tubing must not exert pressure on intact skin. Applied per  Guidelines    Wound dressing removed and wound irrigated with NS.  Applied stoma paste and stoma strip paste above anus, to ensure good seal is acheived.  Applied new wound vac dressing.  Once seal achieved, covered inferior aspect of drape with hydrocolloid to protect from stool.  Next dressing change will be for Monday 6/13/22 by [facility]. If wound vac is not working properly: 1) Check to see if track pad tubing is clogged. If so, remove track pad, apply film over exposed foam, cut dime size hole in film, and apply a new track pad. 2) If film is leaking, find area with leak and reinforce with film. 3) If leak cannot be fixed, remove dressing, apply wet/dry dressing, and inform the WCN.

## 2022-06-09 NOTE — PROGRESS NOTES
PROGRESS NOTE      Chief Complaints:  Patient is currently on BiPAP and she is not verbal.  HPI and  Objective:    Patient examined ICU. Patient hemodynamic stable heart rate 70s. Patient had a low-grade temperature T-current 99.4. Patient currently have wound VAC in place. I examined the patient's left foot wound appears about the same not any worse. There is a still darkened area of the wound edges but no drainage. Review of Systems:  Unable to assess  EXAM:  Visit Vitals  /69 (BP 1 Location: Right upper arm, BP Patient Position: At rest)   Pulse 79   Temp 99.4 °F (37.4 °C)   Resp 22   Ht 5' 6.93\" (1.7 m)   Wt 209 lb 10.5 oz (95.1 kg)   SpO2 100%   Breastfeeding No   BMI 32.91 kg/m²       Patient is awake   Head and neck atraumatic, normocephalic. ENT: No hoarse voice  Cardiac system regular rate rhythm. Pulmonary no audible wheeze  Chest wall excursion normal with respiration cycle  Abdomen is soft not particularly distended. Neurologically nonfocal.  Skin is warm and moist.  Psychosocial: Cooperative. Vascular examination as previously noted no changes.     Recent Results (from the past 24 hour(s))   GLUCOSE, POC    Collection Time: 06/08/22 11:30 AM   Result Value Ref Range    Glucose (POC) 253 (H) 65 - 117 mg/dL    Performed by [address] [place] [address], POC    Collection Time: 06/08/22  5:22 PM   Result Value Ref Range    Glucose (POC) 268 (H) 65 - 117 mg/dL    Performed by [address] [place] [address], POC    Collection Time: 06/08/22 11:31 PM   Result Value Ref Range    Glucose (POC) 186 (H) 65 - 117 mg/dL    Performed by Haroon Wright    BLOOD GAS, ARTERIAL    Collection Time: 06/09/22  2:45 AM   Result Value Ref Range    pH 7.40 7.35 - 7.45      PCO2 35 35 - 45 mmHg    PO2 54 (L) 75 - 100 mmHg    O2 SAT 91 (L) >95 %    BICARBONATE 22 22 - 26 mmol/L    BASE DEFICIT 3.0 (H) 0 - 2 mmol/L    O2 METHOD BiPAP      FIO2 30.0 %    SET RATE 16      EPAP/CPAP/PEEP 5      High PEEP 16 SITE Right Radial      EVELIN'S TEST PASS     GLUCOSE, POC    Collection Time: 06/09/22  5:46 AM   Result Value Ref Range    Glucose (POC) 127 (H) 65 - 117 mg/dL    Performed by Denise Pearce        ASSESSMENT:   Patient is 78 y.o. with diagnosis of : Active Problems:    Sacral ulcer ([address] (5/14/2022)        PLAN:                 Continue local wound care on the left foot. Continue wound VAC. Patient's family has requested repeat complete cardiac echocardiogram, most recent ejection fraction went up to 40%. Family is deciding seeing this patient comfort measure. I will continue monitor her progress.

## 2022-06-09 NOTE — ADT AUTH CERT NOTES
Respiratory Failure GRG - Care Day 10 (6/8/2022) by Colin Freitas       Review Status Review Entered   Completed 6/8/2022 15:55      Criteria Review      Care Day: 10 Care Date: 6/8/2022 Level of Care: ICU    Guideline Day 2    Clinical Status    ( ) * Weaning assessment performed    Interventions    (X) Inpatient interventions continue    6/8/2022 15:55:28 EDT by Sammimoreliamelisa Marissa remains on bipap 30%    * Milestone   Additional Notes   6/8/22      Patient alert awake on BiPAP 30% O2 patient's daughter at the bedside   Still waiting for family decision regarding further treatment plan   Regarding hospice                   Objective:       Visit Vitals   BP (!) 147/69 (BP 1 Location: Left upper arm, BP Patient Position: At rest)   Pulse 88   Temp 99 °F (37.2 °C)   Resp 27   Ht 5' 6.93\" (1.7 m)   Wt 95.1 kg (209 lb 10.5 oz)   SpO2 100%   Breastfeeding No   BMI 32.91 kg/m²           Current Facility-Administered Medications:    o  Vancomycin random level to be drawn on 6/8/22 at 0400 am., , Other, Scott Vargas MD   o  furosemide (LASIX) injection 40 mg, 40 mg, IntraVENous, Q12H, Carlos Merrill MD, 40 mg at 06/07/22 2114   o  glycopyrrolate (ROBINUL) injection 0.1 mg, 0.1 mg, IntraVENous, Q6H, Carlos Merrill MD, 0.1 mg at 06/08/22 0431   o  ferrous sulfate 300 mg (60 mg iron)/5 mL oral syrup 300 mg, 300 mg, Per NG tube, BID WITH MEALS, Israel Soto MD, 300 mg at 06/07/22 1621   o  metoprolol tartrate (LOPRESSOR) tablet 12.5 mg, 12.5 mg, Per NG tube, BID, Luci Koehler MD, 12.5 mg at 06/07/22 2115   o  alteplase (CATHFLO) injection 2 mg, 2 mg, InterCATHeter, PRN, Luci Koehler MD, 2 mg at 06/03/22 1708   o  fluconazole (DIFLUCAN) 200mg/100 mL IVPB (premix), 200 mg, IntraVENous, Q24H, Nuno Ho MD, Last Rate: 100 mL/hr at 06/07/22 0843, 200 mg at 06/07/22 8411   o  aspirin chewable tablet 81 mg, 81 mg, Per G Tube, DAILY, Israel Soto MD, 81 mg at 06/07/22 0845   o  NOREPINephrine (LEVOPHED) 8 mg in 0.9% NS 250ml infusion, 0.5-16 mcg/min, IntraVENous, TITRATE, Israel Soto MD, Paused at 05/29/22 2046   o  sodium chloride (NS) flush 5-40 mL, 5-40 mL, IntraVENous, Q8H, Mynor Matthew MD, 10 mL at 06/08/22 0628   o  sodium chloride (NS) flush 5-40 mL, 5-40 mL, IntraVENous, PRN, Michael Tobin MD, 10 mL at 05/31/22 2213   o  [DISCONTINUED] ondansetron (ZOFRAN ODT) tablet 4 mg, 4 mg, Oral, Q8H PRN **OR** ondansetron (ZOFRAN) injection 4 mg, 4 mg, IntraVENous, Q6H PRN, Jeb Bob MD   o  enoxaparin (LOVENOX) injection 40 mg, 40 mg, SubCUTAneous, DAILY, PaulinoJeb MD, 40 mg at 06/07/22 8222   o  insulin lispro (HUMALOG) injection, , SubCUTAneous, Q6H, Jeb Bob MD, 2 Units at 06/08/22 8798   o  glucose chewable tablet 16 g, 4 Tablet, Oral, PRN, Jeb Bob MD   o  glucagon [facility]) injection 1 mg, 1 mg, IntraMUSCular, PRN, Jeb Bob MD   o  hydrALAZINE (APRESOLINE) tablet 12.5 mg, 12.5 mg, Oral, TID, Jeb Bob MD, 12.5 mg at 06/07/22 1621   o  sacubitriL-valsartan (ENTRESTO) 24-26 mg tablet 1 Tablet, 1 Tablet, Oral, Q12H, Jeb Bob MD, 1 Tablet at 06/07/22 2115   o  ampicillin-sulbactam (UNASYN) 3 g in 0.9% sodium chloride (MBP/ADV) 100 mL MBP, 3 g, IntraVENous, Q8H, Jeb Bob MD, Last Rate: 200 mL/hr at 06/08/22 0628, 3 g at 06/08/22 0198   o  dextrose 10% infusion 0-250 mL, 0-250 mL, IntraVENous, PRN, Jeb Bob MD, 125 mL at 05/19/22 0537   o  insulin glargine (LANTUS) injection 10 Units, 10 Units, SubCUTAneous, QHS, Jeb Bob MD, 10 Units at 06/07/22 2114   o  propofol (DIPRIVAN) 10 mg/mL infusion, 0-50 mcg/kg/min, IntraVENous, TITRATE, Jeb Bob MD, Last Rate: 9.5 mL/hr at 06/03/22 0051, 20 mcg/kg/min at 06/03/22 0051   o  0.9% sodium chloride infusion 250 mL, 250 mL, IntraVENous, PRN, Jeb Bob MD, Last Rate: 15 mL/hr at 05/22/22 0820, Rate Verify at 05/22/22 0820   o  Vancomycin Pharmacy Dosing, , Other, Rx Dosing/Monitoring, Jorge Bob MD   o  acetaminophen (TYLENOL) tablet 650 mg, 650 mg, Oral, Q6H PRN, 650 mg at 05/22/22 0556 **OR** acetaminophen (TYLENOL) suppository 650 mg, 650 mg, Rectal, Q6H PRN, Jorge Bob MD, 650 mg at 05/16/22 2223   o  polyethylene glycol (MIRALAX) packet 17 g, 17 g, Oral, DAILY PRN, Jorge Bob MD   o  ondansetron [facility] ODT) tablet 4 mg, 4 mg, Oral, Q8H PRN **OR** [DISCONTINUED] ondansetron (ZOFRAN) injection 4 mg, 4 mg, IntraVENous, Q6H PRN, Jorge Bob MD   o  famotidine (PEPCID) tablet 20 mg, 20 mg, Oral, BID, Jorge Bob MD, 20 mg at 06/07/22 2115   o  acidophilus-pectin, citrus tablet 2 Tablet, 2 Tablet, Oral, DAILY, Jorge Bob MD, 2 Tablet at 06/07/22 0845   o  albuterol-ipratropium (DUO-NEB) 2.5 MG-0.5 MG/3 ML, 3 mL, Nebulization, Q6H PRN, Jorge Bob MD   o  artificial saliva (MOUTH KOTE) 1 Spray, 1 Spray, Oral, TID, Jorge Bob MD, 1 Spray at 06/07/22 2115   o  brimonidine (ALPHAGAN) 0.2 % ophthalmic solution 1 Drop, 1 Drop, Both Eyes, TID, Jorge Bob MD, 1 Drop at 06/07/22 2116   o  cholestyramine-aspartame (QUESTRAN LIGHT) packet 4 g, 4 g, Oral, BID WITH MEALS, Jorge Bob MD, 4 g at 06/07/22 1622   o  [Held by provider] fenofibrate nanocrystallized (TRICOR) tablet 48 mg, 48 mg, Oral, DAILY, Jorge Bob MD, 48 mg at 05/19/22 3022   o  gabapentin (NEURONTIN) capsule 300 mg, 300 mg, Oral, BID, Jorge Bob MD, 300 mg at 06/07/22 2114   o  insulin glargine (LANTUS) injection 50 Units, 50 Units, SubCUTAneous, DAILY, Jorge Bob MD, 50 Units at 06/06/22 0849   o  latanoprost (XALATAN) 0.005 % ophthalmic solution 1 Drop, 1 Drop, Right Eye, QPM, Jorge Bob MD, 1 Drop at 06/07/22 1720   o  traMADoL (ULTRAM) tablet 25 mg, 25 mg, Oral, Q12H PRN, Jorge Bob MD, 25 mg at 06/03/22 1717                              Respiratory Failure GRG - Care Day 7 (6/5/2022) by Isamar Cha       Review Status Review Entered   Completed 6/6/2022 16:25      Criteria Review      Care Day: 7 Care Date: 6/5/2022 Level of Care: ICU    Guideline Day 1    Level Of Care    (X) ICU or intermediate care as appropriate    Clinical Status    (X) * Clinical Indications met    6/6/2022 16:25:32 EDT by Katie Segura      met,    Interventions    (X) Inpatient interventions as needed    6/6/2022 16:25:32 EDT by Sergio [address], [place] [address] extubated, placed on noninvasive vent as cxr showing fluid overload. giving lasix 40 iv bid and a dose albumin today    * Milestone   Additional Notes   6/5/22            1. Acute hypoxic respiratory failure extubated/3 had to add noninvasive ventilator last night due to respiratory distress   2. Pulmonary edema mild   3. Aspiration chronically   4. Sacral decubiti ulcer with Acinetobacter and Enterococcus   5. Severe sepsis with septic shock resolved on no pressors   6. Left foot wound   7. Transaminitis    8. Shock liver resolved   9. Hypokalemia repleted   10. Symptomatic anemia stable after transfusion   11. Cardiomyopathy ejection fraction 50 to 55%   12. Mild pulmonary edema       RECOMMENDATIONS/PLAN:   1. ICU monitoring   1. Extubated on 6/3 worsening tachypnea last evening placed on noninvasive ventilator likely recurrent aspiration and fluid overload   2. Will increase dose Lasix   3. Will increase Robinul   4. You to monitor hemoglobin   5. Patient underwent on 5/23 left metatarsal amputation and sacral wound debridement   6. Severe sepsis with decubiti ulcer    7. Patient is on Unasyn   8.  Chest x-ray shows mild congestive changes with fluid in the minor fissure9.        1 High complexity decision making was performed   1 See my orders for details   HPI    16-year-old lady came in because of leg pain past medical history of hypertension diabetes mellitus peripheral vascular disease CVA she is bedbound she has leg wound and also has sacral decubiti ulcer and she was scheduled for laparoscopic colostomy and sacral wound debridement and amputation of the left tarsometatarsal because of wound infection but her condition got worse her liver enzyme was elevated INR was also elevated she was intubated transferred to ICU was getting vancomycin and Unasyn started on Levophed because of low blood pressure.       PMH:  has a past medical history of Anemia, Chronic kidney disease, Diabetes mellitus ([address] Hemiplegia affecting dominant side, post-stroke ([facility] (05/04/2022), HTN (hypertension), Hyperkalemia, Hyperlipidemia, Ill-defined condition, Neuropathy, PAD (peripheral artery disease) ([address] Sciatica, Stroke ([address] and UTI (urinary tract infection).       PSH:   has a past surgical history that includes hx other surgical (Bilateral); hx amputation (Right); hx other surgical; hx cervical fusion; hx vascular stent (Bilateral); hx vascular stent (Bilateral); hx gi; and ir insert non tunl cvc over 5 yrs (5/27/2022).        FHX: family history includes Cancer in her mother; Diabetes in her mother; Hypertension in her mother.        SHX:  reports that she has never smoked. She has never used smokeless tobacco. She reports previous alcohol use.  She reports that she does not use drugs.       ALL: No Known Allergies       MEDS:   1 Reviewed - As Below   0 Not reviewed          Current Facility-Administered Medications   Medication   o [START ON 6/6/2022] Vancomycin - Please draw random level 6/6 @ 0400   o ferrous sulfate 300 mg (60 mg iron)/5 mL oral syrup 300 mg   o metoprolol tartrate (LOPRESSOR) tablet 12.5 mg   o alteplase (CATHFLO) injection 2 mg   o glycopyrrolate (ROBINUL) injection 0.1 mg   o fluconazole (DIFLUCAN) 200mg/100 mL IVPB (premix)   o aspirin chewable tablet 81 mg   o NOREPINephrine (LEVOPHED) 8 mg in 0.9% NS 250ml infusion   o sodium chloride (NS) flush 5-40 mL   o sodium chloride (NS) flush 5-40 mL   o furosemide (LASIX) injection 20 mg   o ondansetron (ZOFRAN) injection 4 mg   o enoxaparin (LOVENOX) injection 40 mg   o dextrose 5% infusion   o insulin lispro (HUMALOG) injection   o glucose chewable tablet 16 g   o glucagon (GLUCAGEN) injection 1 mg   o hydrALAZINE (APRESOLINE) tablet 12.5 mg   o sacubitriL-valsartan (ENTRESTO) 24-26 mg tablet 1 Tablet   o ampicillin-sulbactam (UNASYN) 3 g in 0.9% sodium chloride (MBP/ADV) 100 mL MBP   o dextrose 10% infusion 0-250 mL   o insulin glargine (LANTUS) injection 10 Units   o propofol (DIPRIVAN) 10 mg/mL infusion   o 0.9% sodium chloride infusion 250 mL   o Vancomycin Pharmacy Dosing   o acetaminophen (TYLENOL) tablet 650 mg     Or   o acetaminophen (TYLENOL) suppository 650 mg   o polyethylene glycol (MIRALAX) packet 17 g   o ondansetron (ZOFRAN ODT) tablet 4 mg   o famotidine (PEPCID) tablet 20 mg   o acidophilus-pectin, citrus tablet 2 Tablet   o albuterol-ipratropium (DUO-NEB) 2.5 MG-0.5 MG/3 ML   o artificial saliva (MOUTH KOTE) 1 Spray   o brimonidine (ALPHAGAN) 0.2 % ophthalmic solution 1 Drop   o cholestyramine-aspartame (QUESTRAN LIGHT) packet 4 g   o [Held by provider] fenofibrate nanocrystallized (TRICOR) tablet 48 mg   o gabapentin (NEURONTIN) capsule 300 mg   o insulin glargine (LANTUS) injection 50 Units   o latanoprost (XALATAN) 0.005 % ophthalmic solution 1 Drop   o traMADoL (ULTRAM) tablet 25 mg      6/4 extubated on 6/3 now on nasal cannula tolerating well   6/5 respiratory distress last night placed on noninvasive ventilator chest x-ray continues to show fluid overload.  Will increase Lasix to 40 mg twice daily give albumin today. Gayle Ontiveros has continued aspiration.  Will increase Robinul to every 6 hours dosing   Time of care 30 minutes   Ally Wu MD           Respiratory Failure [facility] Day 4 (6/2/2022) by Mariana Alvarado       Review Status Review Entered   Completed 6/2/2022 14:51      Criteria Review      Care Day: 4 Care Date: 6/2/2022 Level of Care: ICU    Guideline Day 1    Level Of Care    (X) ICU or intermediate care as appropriate    6/2/2022 14:51:41 EDT by Valdez Bruce      ICU    Clinical Status    ( ) * Clinical Indications met    Interventions    (X) Inpatient interventions as needed    6/2/2022 14:51:19 EDT by Valdez Bruce      Surgery was canceled yesterday  As overall prognosis very poor  Patient on vent on 21% O2  Getting PEG tube feeding  Wound vacuum intact    * Milestone   Additional Notes   6/2       Surgery was canceled yesterday   As overall prognosis very poor   Patient on vent on 21% O2   Getting PEG tube feeding   Wound vacuum intact       Objective:       Visit Vitals   BP (!) 162/61 (BP 1 Location: Left upper arm, BP Patient Position: Semi fowlers;Supine)   Pulse 93   Temp 98.6 °F (37 °C)   Resp 18   Ht 5' 6.93\" (1.7 m)   Wt 89.1 kg (196 lb 6.9 oz)   SpO2 100%   Breastfeeding No      Physical Exam:        Constitutional: On ventilator   HENT:    Head: Normocephalic and atraumatic. Eyes: Pupils are equal, round, and reactive to light. EOM are normal.    Cardiovascular: Normal rate, regular rhythm and normal heart sounds. Pulmonary/Chest: Breath sounds normal. No wheezes. No rales. Exhibits no tenderness. Abdominal: Soft. Bowel sounds are normal. There is no abdominal tenderness. There is no rebound and no guarding. Musculoskeletal: Normal range of motion.     Neurological: On ventilator    skin sacral decubitus ulcer and left foot wound right big toe amputation

## 2022-06-09 NOTE — PROGRESS NOTES
Progress Note    Patient: Stella Rose MRN: [id number]  SSN: [id number]    YOB: 1945  Age: 78 y.o. Sex: female      Admit Date: 5/14/2022    LOS: 26 days     Subjective:   Patient followed for sacral wound infection with repeat culture growing Acinetobacter and Enterococci. Left foot wound also grew Acinetobacter, now status post left TMA. WBC now decreasing along with CRP and procal.  Urinalysis showing marked pyuria and yeasts for which she is on Fluconazole. She is currently on Vancomycin and Unasyn. Patient has now been extubated. She is awake but nonverbal.  CXR unchanged today. Family member at bedside states that they had a meeting earlier today and decision was made to place her on hospice and taken home later this week. Objective:     Vitals:    06/09/22 0900 06/09/22 1000 06/09/22 1100 06/09/22 1200   BP: 132/62 123/61 127/62 137/65   Pulse: 79 82 81 83   Resp: 25 25 23 24   Temp:   99.8 °F (37.7 °C)    SpO2: 100% 100% 100% 100%   Weight:       Height:            Intake and Output:  Current Shift: 06/09 0701 - 06/09 1900  In: -   Out: 600 [Urine:600]  Last three shifts: 06/07 1901 - 06/09 0700  In: 4294 [I.V.:400]  Out: 3200 [Urine:3200]    Physical Exam:    Vitals and nursing note reviewed. Constitutional:       General: She is not in acute distress. Appearance: She is ill-appearing. HENT: BIPAP    Eyes: open        Cardiovascular:      Rate and Rhythm: Normal rate and regular rhythm. Heart sounds: No murmur heard. Pulmonary: clear bilaterally    Abdominal:      General: Bowel sounds are normal.      Palpations: Abdomen is soft. Tenderness: There is no abdominal tenderness. Comments: PEG site unremarkable today      Genitourinary:     Comments: Indwelling Bell             Musculoskeletal:      Cervical back: Neck supple. Right lower leg: No edema. Left lower leg: No edema.  No     Comments: Left foot surgical wound now shows dehiscence without drainage today, distal foot is dusky  Skin:     Findings: No rash. Comments: Sacral wound unchanged  Neurological: somnolent  Psychiatric:  somnolent     Lab/Data Review:     WBC 12,800    ALT/AST 55 /14  Urinalysis with WBC >100, Yeasts, Bacteria negative    Procal 0.84 <0.64 <0.31 <0.26 <0.24 <0.31 <0.36  <0.59 <0.57  CRP 14.20 < 13.60 < 14.60 <11.40 <12.60  <18.90 <17.90    Blood cultures (5/14) Coagulase negative Staphylococci  Sacral wound (5/14) Acinetobacter baumannii sensitive to Unasyn, Cefepime, Ceftazidime and Enterococcus faecium resistant to Ampicillin  Sacral wound (5/23) Mixed skin yasmine FINAL  Left great toe (5/15) Acinetobacter baumannii  Sputum culture (5/19) Rare normal respiratory yasmine FINAL     CXR (6/7)  Asymmetric left greater than right central interstitial edema perhaps mildlyreduced compared to prior imaging. Assessment:     Active Problems:    Sacral ulcer ([address] (5/14/2022)    1. Sacral wound infection secondary now to Acinetobacter baumannii and Enterococcus faecium, on Vancomycin and Unasyn, status post excisional wound debridement to the bone, Day #20 Vancomycin and Day #17 Unasyn. 2. Sepsis with persistent leukocytosis, elevated procal and CRP  3. Left foot wound, culture growing Acinetobacter baumannii, status post TMA  4. Possible ischemic hepatitiis with transaminitis following hypotensive episode, resolving  5. Positive blood cultures for Coagulase negative Staphylococci, probable contaminant  6. Hepatitis C antibody positive, resolved (negative HCV viral level)  7. New onset cardiomyopathy  8. Candida UTI with marked pyuria and yeasts, Day #9 IV Fluconazole  9. ? Hospice planned    Comment:  WBC, CRP and procal remain elevated. Plan:   1. Continue IV Unasyn and Vancomycin (for E. Faecium) unless placed on hospice  2.  Continue Fluconazole 200 mg IV daily for 5 more days or until placed on hospice       Signed By: Jose Mortensen MD     June 9, 2022

## 2022-06-09 NOTE — PROGRESS NOTES
IMPRESSION:   1. Acute hypoxic respiratory failure extubated patient was put on BiPAP machine as her condition got worse  2. Pulmonary edema mild  3. Aspiration chronically  4. Sacral decubiti ulcer with Acinetobacter and Enterococcus  5. Severe sepsis with septic shock resolved on no pressors  6. Left foot wound  7. Candidal UTI  8. Transaminitis   9. Shock liver resolved  10. Hypokalemia repleted  11. Symptomatic anemia stable after transfusion  12. Cardiomyopathy ejection fraction 50 to 55%  13. Mild pulmonary edema      RECOMMENDATIONS/PLAN:   1. ICU monitoring  1. Extubated on 6/3 worsening tachypnea she was placed on noninvasive ventilator likely recurrent aspiration and fluid overload, she is on noninvasive ventilator BiPAP machine we will try to wean to high flow she desaturates on nasal cannula  2. Continue with Lasix  3. Drop in hemoglobin will continue to follow  4. New onset cardiomyopathy with the Lasix chest x-ray shows improvement in the pleural effusion  5. Persistent infection on Unasyn and vancomycin  6. Chest x-ray shows worsening of the congestion  7. Patient underwent on 5/23 left metatarsal amputation and sacral wound debridement  8. Severe sepsis with decubiti ulcer   9. Patient is on Unasyn  10.  Chest x-ray shows mild congestive changes with fluid in the minor fissure     [x] High complexity decision making was performed  [x] See my orders for details  HPI   56-year-old lady came in because of leg pain past medical history of hypertension diabetes mellitus peripheral vascular disease CVA she is bedbound she has leg wound and also has sacral decubiti ulcer and she was scheduled for laparoscopic colostomy and sacral wound debridement and amputation of the left tarsometatarsal because of wound infection but her condition got worse her liver enzyme was elevated INR was also elevated she was intubated transferred to ICU was getting vancomycin and Unasyn started on Levophed because of low blood pressure. PMH:  has a past medical history of Anemia, Chronic kidney disease, Diabetes mellitus ([address] Hemiplegia affecting dominant side, post-stroke ([address] (05/04/2022), HTN (hypertension), Hyperkalemia, Hyperlipidemia, Ill-defined condition, Neuropathy, PAD (peripheral artery disease) ([address] Sciatica, Stroke ([address] and UTI (urinary tract infection). PSH:   has a past surgical history that includes hx other surgical (Bilateral); hx amputation (Right); hx other surgical; hx cervical fusion; hx vascular stent (Bilateral); hx vascular stent (Bilateral); hx gi; and ir insert non tunl cvc over 5 yrs (5/27/2022). FHX: family history includes Cancer in her mother; Diabetes in her mother; Hypertension in her mother. SHX:  reports that she has never smoked. She has never used smokeless tobacco. She reports previous alcohol use. She reports that she does not use drugs. ALL: No Known Allergies     MEDS:   [x] Reviewed - As Below   [] Not reviewed    Current Facility-Administered Medications   Medication    Vancomycin random  level to be drawn on 6/9/22 at 1000.     calcium polycarbophiL (FIBERCON) tablet 625 mg    ferrous sulfate tablet 325 mg    furosemide (LASIX) injection 40 mg    glycopyrrolate (ROBINUL) injection 0.1 mg    metoprolol tartrate (LOPRESSOR) tablet 12.5 mg    alteplase (CATHFLO) injection 2 mg    fluconazole (DIFLUCAN) 200mg/100 mL IVPB (premix)    aspirin chewable tablet 81 mg    NOREPINephrine (LEVOPHED) 8 mg in 0.9% NS 250ml infusion    sodium chloride (NS) flush 5-40 mL    sodium chloride (NS) flush 5-40 mL    ondansetron (ZOFRAN) injection 4 mg    enoxaparin (LOVENOX) injection 40 mg    insulin lispro (HUMALOG) injection    glucose chewable tablet 16 g    glucagon (GLUCAGEN) injection 1 mg    hydrALAZINE (APRESOLINE) tablet 12.5 mg    sacubitriL-valsartan (ENTRESTO) 24-26 mg tablet 1 Tablet    ampicillin-sulbactam (UNASYN) 3 g in 0.9% sodium chloride (MBP/ADV) 100 mL MBP    dextrose 10% infusion 0-250 mL    insulin glargine (LANTUS) injection 10 Units    propofol (DIPRIVAN) 10 mg/mL infusion    0.9% sodium chloride infusion 250 mL    Vancomycin Pharmacy Dosing    acetaminophen (TYLENOL) tablet 650 mg    Or    acetaminophen (TYLENOL) suppository 650 mg    polyethylene glycol (MIRALAX) packet 17 g    ondansetron (ZOFRAN ODT) tablet 4 mg    famotidine (PEPCID) tablet 20 mg    acidophilus-pectin, citrus tablet 2 Tablet    albuterol-ipratropium (DUO-NEB) 2.5 MG-0.5 MG/3 ML    artificial saliva (MOUTH KOTE) 1 Spray    brimonidine (ALPHAGAN) 0.2 % ophthalmic solution 1 Drop    cholestyramine-aspartame (QUESTRAN LIGHT) packet 4 g    [Held by provider] fenofibrate nanocrystallized (TRICOR) tablet 48 mg    gabapentin (NEURONTIN) capsule 300 mg    insulin glargine (LANTUS) injection 50 Units    latanoprost (XALATAN) 0.005 % ophthalmic solution 1 Drop    traMADoL (ULTRAM) tablet 25 mg      MAR reviewed and pertinent medications noted or modified as needed   Current Facility-Administered Medications   Medication    Vancomycin random  level to be drawn on 6/9/22 at 1000.     calcium polycarbophiL (FIBERCON) tablet 625 mg    ferrous sulfate tablet 325 mg    furosemide (LASIX) injection 40 mg    glycopyrrolate (ROBINUL) injection 0.1 mg    metoprolol tartrate (LOPRESSOR) tablet 12.5 mg    alteplase (CATHFLO) injection 2 mg    fluconazole (DIFLUCAN) 200mg/100 mL IVPB (premix)    aspirin chewable tablet 81 mg    NOREPINephrine (LEVOPHED) 8 mg in 0.9% NS 250ml infusion    sodium chloride (NS) flush 5-40 mL    sodium chloride (NS) flush 5-40 mL    ondansetron (ZOFRAN) injection 4 mg    enoxaparin (LOVENOX) injection 40 mg    insulin lispro (HUMALOG) injection    glucose chewable tablet 16 g    glucagon (GLUCAGEN) injection 1 mg    hydrALAZINE (APRESOLINE) tablet 12.5 mg    sacubitriL-valsartan (ENTRESTO) 24-26 mg tablet 1 Tablet    ampicillin-sulbactam (UNASYN) 3 g in 0.9% sodium chloride (MBP/ADV) 100 mL MBP    dextrose 10% infusion 0-250 mL    insulin glargine (LANTUS) injection 10 Units    propofol (DIPRIVAN) 10 mg/mL infusion    0.9% sodium chloride infusion 250 mL    Vancomycin Pharmacy Dosing    acetaminophen (TYLENOL) tablet 650 mg    Or    acetaminophen (TYLENOL) suppository 650 mg    polyethylene glycol (MIRALAX) packet 17 g    ondansetron (ZOFRAN ODT) tablet 4 mg    famotidine (PEPCID) tablet 20 mg    acidophilus-pectin, citrus tablet 2 Tablet    albuterol-ipratropium (DUO-NEB) 2.5 MG-0.5 MG/3 ML    artificial saliva (MOUTH KOTE) 1 Spray    brimonidine (ALPHAGAN) 0.2 % ophthalmic solution 1 Drop    cholestyramine-aspartame (QUESTRAN LIGHT) packet 4 g    [Held by provider] fenofibrate nanocrystallized (TRICOR) tablet 48 mg    gabapentin (NEURONTIN) capsule 300 mg    insulin glargine (LANTUS) injection 50 Units    latanoprost (XALATAN) 0.005 % ophthalmic solution 1 Drop    traMADoL (ULTRAM) tablet 25 mg      PMH:  has a past medical history of Anemia, Chronic kidney disease, Diabetes mellitus ([address] Hemiplegia affecting dominant side, post-stroke ([address] (05/04/2022), HTN (hypertension), Hyperkalemia, Hyperlipidemia, Ill-defined condition, Neuropathy, PAD (peripheral artery disease) ([address] Sciatica, Stroke ([address] and UTI (urinary tract infection). PSH:   has a past surgical history that includes hx other surgical (Bilateral); hx amputation (Right); hx other surgical; hx cervical fusion; hx vascular stent (Bilateral); hx vascular stent (Bilateral); hx gi; and ir insert non tunl cvc over 5 yrs (5/27/2022). FHX: family history includes Cancer in her mother; Diabetes in her mother; Hypertension in her mother. SHX:  reports that she has never smoked. She has never used smokeless tobacco. She reports previous alcohol use. She reports that she does not use drugs.      ROS:  Unable to obtain intubated on ventilator    Hemodynamics:    CO: CI:    CVP:    SVR:   PAP Systolic:    PAP Diastolic:    PVR:    FA87:        Ventilator Settings:      Mode Rate TV Press PEEP FiO2 PIP Min. Vent   Pressure support,Spontaneous    500 ml 15 cm H2O  5 cm H20 30 %  27 cm H2O  12 l/min        Vital Signs: Telemetry:    normal sinus rhythm Intake/Output:   Visit Vitals  /69 (BP 1 Location: Right upper arm, BP Patient Position: At rest)   Pulse 79   Temp 99.4 °F (37.4 °C)   Resp 22   Ht 5' 6.93\" (1.7 m)   Wt 95.1 kg (209 lb 10.5 oz)   SpO2 100%   Breastfeeding No   BMI 32.91 kg/m²       Temp (24hrs), Av.5 °F (36.9 °C), Min:97.6 °F (36.4 °C), Max:99.4 °F (37.4 °C)        O2 Device: BIPAP O2 Flow Rate (L/min): 3 l/min       Wt Readings from Last 4 Encounters:   22 95.1 kg (209 lb 10.5 oz)   04/10/22 86.6 kg (191 lb)   22 82.2 kg (181 lb 3.5 oz)   20 84.4 kg (186 lb)          Intake/Output Summary (Last 24 hours) at 2022 0852  Last data filed at 2022 0300  Gross per 24 hour   Intake 1530 ml   Output 2250 ml   Net -720 ml       Last shift:      No intake/output data recorded. Last 3 shifts:  1901 -  0700  In: 2575 [I.V.:400]  Out: 3200 [Urine:3200]       Physical Exam:     General: On noninvasive ventilator weakly tries to open her eyes follows no commands  HEENT: NCAT,  Eyes: anicteric; conjunctiva clear, random eye movement when eyelids are open  Neck: no nodes, , trach midline; no accessory MM use. Neck veins obscured by obesity but seems slightly engorged  Chest: no deformity,   Cardiac: R regular; no murmur;   Lungs: Shallow breaths with diffuse wheezes and rhonchi  Abd: soft, NT, hypoactive BS  Ext: Diffuse edema; no joint swelling;  No clubbing  :clear urine  Neuro: Seems to weakly try to open her eyes follows no commands does not speak does not move her extremities  Psych-  unable to assess  Skin: warm, dry, no cyanosis;   Pulses: Brachial and radial pulses intact  Capillary: Normal capillary refill      DATA:    MAR reviewed and pertinent medications noted or modified as needed  MEDS:   Current Facility-Administered Medications   Medication    Vancomycin random  level to be drawn on 6/9/22 at 1000.     calcium polycarbophiL (FIBERCON) tablet 625 mg    ferrous sulfate tablet 325 mg    furosemide (LASIX) injection 40 mg    glycopyrrolate (ROBINUL) injection 0.1 mg    metoprolol tartrate (LOPRESSOR) tablet 12.5 mg    alteplase (CATHFLO) injection 2 mg    fluconazole (DIFLUCAN) 200mg/100 mL IVPB (premix)    aspirin chewable tablet 81 mg    NOREPINephrine (LEVOPHED) 8 mg in 0.9% NS 250ml infusion    sodium chloride (NS) flush 5-40 mL    sodium chloride (NS) flush 5-40 mL    ondansetron (ZOFRAN) injection 4 mg    enoxaparin (LOVENOX) injection 40 mg    insulin lispro (HUMALOG) injection    glucose chewable tablet 16 g    glucagon (GLUCAGEN) injection 1 mg    hydrALAZINE (APRESOLINE) tablet 12.5 mg    sacubitriL-valsartan (ENTRESTO) 24-26 mg tablet 1 Tablet    ampicillin-sulbactam (UNASYN) 3 g in 0.9% sodium chloride (MBP/ADV) 100 mL MBP    dextrose 10% infusion 0-250 mL    insulin glargine (LANTUS) injection 10 Units    propofol (DIPRIVAN) 10 mg/mL infusion    0.9% sodium chloride infusion 250 mL    Vancomycin Pharmacy Dosing    acetaminophen (TYLENOL) tablet 650 mg    Or    acetaminophen (TYLENOL) suppository 650 mg    polyethylene glycol (MIRALAX) packet 17 g    ondansetron (ZOFRAN ODT) tablet 4 mg    famotidine (PEPCID) tablet 20 mg    acidophilus-pectin, citrus tablet 2 Tablet    albuterol-ipratropium (DUO-NEB) 2.5 MG-0.5 MG/3 ML    artificial saliva (MOUTH KOTE) 1 Spray    brimonidine (ALPHAGAN) 0.2 % ophthalmic solution 1 Drop    cholestyramine-aspartame (QUESTRAN LIGHT) packet 4 g    [Held by provider] fenofibrate nanocrystallized (TRICOR) tablet 48 mg    gabapentin (NEURONTIN) capsule 300 mg    insulin glargine (LANTUS) injection 50 Units    latanoprost (XALATAN) 0.005 % ophthalmic solution 1 Drop    traMADoL (ULTRAM) tablet 25 mg        Labs:    Recent Labs     22  0351 22  0330   WBC 12.8* 15.0*   HGB 7.5* 7.8*    247     Recent Labs     22  0351 22  0330   * 148*   K 4.0 3.8   * 118*   CO2 23 22   * 108*   BUN 48* 42*   CREA 1.14* 1.00   CA 8.1* 9.1   MG 2.3  --    PHOS 2.2*  --    ALB 2.1* 2.4*   ALT 99* 73     Recent Labs     22  0245   PH 7.40   PCO2 35   PO2 54*   HCO3 22   FIO2 30.0    and IV IP 16 EP 5 rate 16 FiO2 30%    AC 12  PEEP 5 FiO2 30%   echo ejection fraction 20% mild mitral regurg left atrium 3.5 cm RVSP 35  , 1313, 1361    Lab Results   Component Value Date/Time    Culture result: Heavy  Mixed skin yasmine isolated   2022 06:15 PM    Culture result: Rare Normal respiratory yasmine 2022 02:48 PM    Culture result: Heavy Acinetobacter baumannii complex 05/15/2022 07:15 PM    Culture result: Heavy Diphtheroids 05/15/2022 07:15 PM     Lab Results   Component Value Date/Time    TSH 2.880 2016 10:13 AM        Imagin/22 chest x-ray with ET tube in place hazy accentuated basilar markings with small amount of fluid in the minor fissure  Results from Hospital Encounter encounter on 22    XR CHEST PORT    Narrative  Reason for Visit:  HISTORY: Evaluate for congestive heart failure. TECHNIQUE: Portable AP radiograph the chest dated 2022 obtained at 2:42 AM.  COMPARISON: 2022. LIMITATIONS: Patient rotation to the right. TUBES/LINES:  There is a right internal jugular central venous line in  satisfactory position. The tip of the central line is located within the upper  right atrium. HEART AND PULMONARY VESSELS: There is a moderate enlargement of the cardiac  silhouette. There is associated pulmonary vascular distention and perivascular  ill definition secondary to interstitial edema.     LUNG PARENCHYMA: There is interstitial edema with a decrease in the edema  pattern within the left lung. This examination is negative for dense airspace  consolidation. PLEURA: This examination is negative for larger pleural effusions or a  pneumothorax. MEDIASTINUM: Stable. BONE/SOFT TISSUES: The patient is status post placement of integrated graft  fusion material within her disc spaces at the C5-C6 and the C6-C7 levels. There  is right lateral spondylosis along the thoracic spine secondary to diffuse  idiopathic skeletal hyperostosis. This examination is negative for acute osseous  abnormalities. Impression  1. There has been an improvement in the pulmonary edema pattern particularly  within the left perihilar region. There is persistent milder interstitial edema  within the right lung. 2.  This examination is negative for new pulmonary pathology or additional  interval changes. Results from [place] encounter on 05/14/22    CT ABD PELV WO CONT    Narrative  CT ABD PELV WO CONT    Technique: Noncontrasted CT scan of the abdomen and pelvis was performed  utilizing transaxial images obtained from the dome of the diaphragm to the pubic  symphysis. Coronal and sagittal reconstructions were generated. Dose Reduction:  All CT scans at this facility are performed using dose reduction optimization  techniques as appropriate to a performed exam including the following: Automated  exposure control, adjustments of the mA and/or kV according to patient size, or  use of iterative reconstruction technique. Comparison:  2/25/2022. Findings:  Small-moderate pleural effusions are present with bibasilar  atelectasis. Atherosclerotic calcifications are again evident including coronary  artery calcifications. Gastrostomy tube tip in the gastric antrum. The liver, spleen, pancreas, and  adrenal glands are unremarkable for noncontrasted technique. Stable lobulated  kidneys. Negative for hydronephrosis. Gallbladder is unremarkable.  No biliary  duct dilatation apparent. The abdominal aorta is normal in caliber. There is no evidence of bowel obstruction. Small volume ascites has developed. The urinary bladder is decompressed by a Bell catheter. The uterus is  unremarkable. The appendix is normal.    There is mild body wall edema. No suspicious lytic or blastic lesion evident. Impression  Third spacing of fluids with small volume ascites, small-moderate  pleural effusions, and mild body wall edema. Bibasilar atelectasis. 5/20 intubated on ventilator we will continue current vent settings patient is supposed to go for surgery because of transaminitis elevated liver enzyme we will wait as per surgeon for sacral wound debridement and diverting loop colostomy off Levophed, improvement in AST and ALT  5/21 continue with the current vent settings ABG acceptable liver enzyme improving awaiting for the surgery  5/22 maintain ventilator as is in anticipation of surgery.   Renal function improved mild congestion on chest x-ray we will give 1 dose Lasix and potassium  5/23 patient is going for surgery today we will leave ventilator as it is INR is 1.5  5/25 patient remained on ventilator intubated doing well, patient received vitamin K fresh frozen plasma schedule for laparoscopic loop colostomy possible today  5/26 no surgery has been plans will start weaning do sedation vacation  5/27 try to do sedation vacation to wean patient blood pressure dropped still on Levophed we will continue to wean discussed with the family at bedside  5/28 remains on ventilator sedated and also on Levophed possible surgery next week  5/29 on ventilator ET tube advisement 1 to 1.5 cm we will repeat chest x-ray ABG acceptable  5/30 continue with current vent setting  5/31 awaiting for possible surgery if not we will start weaning  6/1 remain intubated on ventilator surgery has been delayed we will start weaning and plan for extubation  6/2 no surgery was planned so we will try to wean she is off Levophed we will do sedation vacation and if weaning cardio acceptable will extubate  6/3 change vent settings to spontaneous if weaning creatinine acceptable will extubate  6/4 extubated on 6/3 now on nasal cannula tolerating well  6/5 respiratory distress last night placed on noninvasive ventilator chest x-ray continues to show fluid overload. Will increase Lasix to 40 mg twice daily give albumin today. Likely has continued aspiration.   Will increase Robinul to every 6 hours dosing  6/6 patient is alert awake he is full code we will try to wean BiPAP put on nasal cannula and keep BiPAP at night  6/7 noninvasive ventilator Machine we will change it to high flow nasal cannula  6/8 on noninvasive ventilator BiPAP machine chest x-ray shows improvement overall condition prognosis poor  Time of care 30 minutes

## 2022-06-09 NOTE — PROGRESS NOTES
Vancomycin Dosing Consult  Day # 25 of vancomycin therapy  Consult ordered by Dr. Bernadette Deshpande for this 78 y.o. female for indication of  sacral wound infection, sepsis. Antibiotic regimen: Vancomycin + Unasyn  + fluconazole    Temp (24hrs), Av.8 °F (37.1 °C), Min:97.6 °F (36.4 °C), Max:99.8 °F (37.7 °C)    Recent Labs     22  0351 22  0330   WBC 12.8* 15.0*     Recent Labs     22  0351 22  0330   CREA 1.14* 1.00   BUN 48* 42*     Recent Labs     22  0330   CRP 14.20*     Recent Labs     22  0330   PCT 0.84*       Estimated Creatinine Clearance: 50.4 mL/min (A) (based on SCr of 1.14 mg/dL (H)). ml/min  Concomitant nephrotoxic drugs: Norepi + Lasix    Cultures:    Wound: Moderate MDR Pseudomonas aeruginosa susceptible to Zerbaxa, aminoglycosides), moderate mixed skin yasmine, moderate mixed enteric yasmine including yeast, final   Blood: CoNS in the anaerobic bottle, final   Wound, ulcer: Moderate Acinetobacter baumannii, moderate Enterococcus faecium, light >2 organisms (contraindicated), final  5/15 Wound, great toe: Heavy Acinetobacter baumannii complex (Zosyn resistant, susceptible to Unasyn), heavy Diphtheroids, final   Tissue: Heavy mixed skin yasmine, final    MRSA Swab: Not ordered, patient already received first dose of vancomycin    Target range: Trough 10-15 mcg/mL    Last Level:      Vancomycin, random   Date/Time Value Ref Range Status   2022 09:50 AM 15.5 ug/mL Final     Comment:     No reference range has been established.      Recent level history (3):  Date/Time Dose & Interval Measured Level (mcg/mL)    @ 0443 500 mg X1 at 1100 13.6    @ 0351 500 mg X1 at 1300 14.6    @0950 500 mg X1 at 1234 15.5     Ht Readings from Last 1 Encounters:   22 170 cm (66.93\")     Wt Readings from Last 1 Encounters:   22 95.1 kg (209 lb 10.5 oz)     Ideal body weight: 61.4 kg (135 lb 7.1 oz)  Adjusted ideal body weight: 74.9 kg (165 lb 2.1 oz) Assessment/Plan:   WBC still elevating  Patient has stable CKD  Will change to Vancomycin 500mg IV q24h  Antimicrobial stop date TBD

## 2022-06-09 NOTE — PROGRESS NOTES
General Daily Progress Note          Patient Name:   Deanne Delgado       YOB: 1945       Age:  78 y.o. 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 Cipro    Admitted again yesterday concerning for worsening of sacral ulcer    During last admission patient was seen by infectious disease and also seen by the vascular surgeon patient had debridement of wound during the last admission    Patient seen by the infectious disease yesterday    Sacral wound infection recent cultures growing Pseudomonas resistant to Zosyn and meropenem    5/17    Patient alert awake not in distress  Patient was seen by the vascular surgeon    Vascular surgery planned for laparoscopic loop colostomy placement and sacral debridement then wound vacuum  Also try to close the wound on the left foot with TMA    5/18    Resting in the bed not in any distress  Blood sugars running high    Seen by infectious disease continue vancomycin and start on Unasyn    5/19    And went to the OR intubated because of elevated LFT and elevated INR  Surgery was canceled    On ventilator 40% O2  Propofol drip    5/20    Patient on ventilator with 30% O2  On propofol drip    Hemoglobin dropped to 6.8    Patient's daughter at the bedside    5/20    Patient on ventilator 30% O2  On propofol drip  Getting PEG tube feeding    5/21    Patient still on ventilator 30% O2  On propofol drip  Liver Function improving    5/22  On ventilator with 30% O2 on propofol drip    5/24  Awaiting tissue culture results.    On ventilator with 30% O2 on propofol drip  Left transmetatarsal amputation and Sacral wound excisional wound debridement 13 x 15 cm to the bone completed yesterday. CXR: Hazy mid and lower lung reticular markings.  Dependent small to moderate volume,  right greater than left pleural effusions  Remarkable labs   WBC: 15.2   Hgb: 8.5   Na; 148  CRP: 13.60   Procal: 0.71      5/25    Patient on ventilator with 30% O2  Seen by the vascular surgeon and plan for surgery today    5/26  Patient on ventilator with 30% O2  Hypotensive on Levophed  On propofol drip    Surgery was canceled yesterday because patient unstable hemodynamically not cleared by the anesthesia    5/27    Patient on ventilator with 30% O2  Hypotensive on Levophed  Getting PEG tube feeding    5/30     Patient on ventilator with 21% O2  On propofol drip  Off pressor    5/31    Patient on ventilator with 21% O2  On propofol drip    Off pressor  Patient have a wound vacuum      6/1    Patient scheduled for surgery today    6/2    Surgery was canceled yesterday  As overall prognosis very poor  Patient on vent on 21% O2  Getting PEG tube feeding  Wound vacuum intact      6/3    Patient still on ventilator  Seen by vascular surgeon left foot concern of ischemic changes    6/4    Patient extubated /tachypneic daughter at the bedside    6/5    Patient on BiPAP  30% O2    6/6    Patient on BiPAP 30% O2    Awake not in distress    6/7    Still on BiPAP 30% O2  Awake    6/8    Patient alert awake on BiPAP 30% O2 patient's daughter at the bedside  Still waiting for family decision regarding further treatment plan  Regarding hospice    6/9    Patient sleeping in the bed on BiPAP 30% percent O2  Wound vacuum in place          Objective:     Visit Vitals  /69 (BP 1 Location: Right upper arm, BP Patient Position: At rest)   Pulse 79   Temp 99.4 °F (37.4 °C)   Resp 22   Ht 5' 6.93\" (1.7 m)   Wt 95.1 kg (209 lb 10.5 oz)   SpO2 100%   Breastfeeding No   BMI 32.91 kg/m²        Recent Results (from the past 24 hour(s))   GLUCOSE, POC    Collection Time: 06/08/22 11:30 AM   Result Value Ref Range    Glucose (POC) 253 (H) 65 - 117 mg/dL    Performed by [address] [place] [address], POC    Collection Time: 06/08/22  5:22 PM   Result Value Ref Range    Glucose (POC) 268 (H) 65 - 117 mg/dL    Performed by [address] [place] [address], POC    Collection Time: 06/08/22 11:31 PM   Result Value Ref Range    Glucose (POC) 186 (H) 65 - 117 mg/dL    Performed by [facility]    BLOOD GAS, ARTERIAL    Collection Time: 06/09/22  2:45 AM   Result Value Ref Range    pH 7.40 7.35 - 7.45      PCO2 35 35 - 45 mmHg    PO2 54 (L) 75 - 100 mmHg    O2 SAT 91 (L) >95 %    BICARBONATE 22 22 - 26 mmol/L    BASE DEFICIT 3.0 (H) 0 - 2 mmol/L    O2 METHOD BiPAP      FIO2 30.0 %    SET RATE 16      EPAP/CPAP/PEEP 5      High PEEP 16      SITE Right Radial      EVELIN'S TEST PASS     GLUCOSE, POC    Collection Time: 06/09/22  5:46 AM   Result Value Ref Range    Glucose (POC) 127 (H) 65 - 117 mg/dL    Performed by [address] [place] [address], POC    Collection Time: 06/09/22  9:41 AM   Result Value Ref Range    Glucose (POC) 156 (H) 65 - 117 mg/dL    Performed by Friday Lisa      [unfilled]      Review of Systems    Not a good historian e. Physical Exam:      Constitutional: On ventilator  HENT:   Head: Normocephalic and atraumatic. Eyes: Pupils are equal, round, and reactive to light. EOM are normal.   Cardiovascular: Normal rate, regular rhythm and normal heart sounds. Pulmonary/Chest: Breath sounds normal. No wheezes. No rales. Exhibits no tenderness. Abdominal: Soft. Bowel sounds are normal. There is no abdominal tenderness. There is no rebound and no guarding. Musculoskeletal: Normal range of motion. Neurological: On ventilator   skin sacral decubitus ulcer and left foot wound right big toe amputation    XR CHEST PORT   Final Result   1.   There has been an improvement in the pulmonary edema pattern particularly   within the left perihilar region. There is persistent milder interstitial edema   within the right lung. 2.  This examination is negative for new pulmonary pathology or additional   interval changes. XR CHEST PORT   Final Result      XR CHEST PORT   Final Result   Pulmonary vascular congestion and predominantly perihilar opacities,   increased. XR CHEST PORT   Final Result   Worsening lung aeration. XR CHEST PORT   Final Result   No significant change. XR CHEST PORT   Final Result   No significant change. XR CHEST PORT   Final Result      XR CHEST PORT   Final Result      XR CHEST PORT   Final Result   No interval change or acute process. XR CHEST PORT   Final Result   ET tube retracted from previous, with tip now at the level of the   thoracic inlet, 8-9 cm above the jennifer. Stable appearance of right central   line. Stable mild enlargement of cardiopericardial silhouette. No evidence of   pulmonary edema, air space pneumonia, or pleural effusion. No evidence of   pneumothorax. Some structures are partially obscured by overlying monitoring   electrodes. XR CHEST PORT   Final Result   Basilar airspace disease, possibly subsegmental atelectasis, stable. XR CHEST PORT   Final Result   Cardiomegaly with vascular congestion. Stable appearance of ET tube. Right central line placed, without radiographic evidence of complication. IR INSERT NON TUNL CVC OVER 5 YRS   Final Result   Successful placement of a triple-lumen central venous catheter. The   catheter is ready for use. XR CHEST PORT   Final Result      XR CHEST PORT   Final Result      XR CHEST PORT   Final Result      CT ABD PELV WO CONT   Final Result   Third spacing of fluids with small volume ascites, small-moderate   pleural effusions, and mild body wall edema. Bibasilar atelectasis. XR CHEST PORT   Final Result   Similar findings compared to single view chest 1 day earlier.       XR CHEST PORT   Final Result Findings/IMPRESSION: Stable appearance of endotracheal tube. Stable mild   enlargement of cardiomediastinal silhouette. Central vascular congestion with   bilateral perihilar and basilar opacities, consistent with edema and/or   pneumonia, right greater than left. Possible right pleural effusion. No   pneumothorax. XR CHEST PORT   Final Result   Bibasilar opacities, possibly increased on the right. XR CHEST PORT   Final Result   No significant change allowing for difference in technique and   positioning. Single portable AP view compared to yesterday. Suboptimal inspiratory film   compromises visualization of the lower lung fields. Monitoring equipment   overlies the body. The tip of the tube is approximately 3 cm above the jennifer. Mild apparent cardiomegaly. Left heart border and left diaphragm obscured. Hazy airspace opacification both lung bases may be a combination of atelectasis,   pneumonia, or edema. No large effusion. Negative for pneumothorax. XR CHEST PORT   Final Result   No significant change allowing for difference in technique and   positioning. Single portable AP view compared to yesterday. Rotation to the right. Monitoring   equipment overlies the body. Suboptimal inspiratory film compromises   visualization of the lower lung fields. Mild cardiomegaly. The tip of the ET tube is approximately 3 cm above the jennifer. Mild basal interstitial edema. No new consolidation. No pleural effusion or   pneumothorax. XR CHEST PORT   Final Result   1. The endotracheal tube is in satisfactory position. 2.  There has been a partial interval resolution in the pulmonary interstitial   edema pattern. XR CHEST PORT   Final Result   Ill-defined haziness in the mid to lower lung zones suspect for mild   atelectatic changes and/or interstitial fluid or pleural fluid. US ABD LTD   Final Result   1. Question pericholecystic fluid.  No identified gallstones or sludge. 2. Right pleural effusion. 3. Increased right renal echotexture for medical renal disease. XR CHEST PORT   Final Result   Intubation. Findings suggesting hydrostatic edema. XR CHEST PORT   Final Result   No evidence of an acute cardiopulmonary process.       IR FLUORO GUIDE PLC CVAD    (Results Pending)   IR US GUIDED VASCULAR ACCESS    (Results Pending)   XR CHEST PORT    (Results Pending)        Recent Results (from the past 24 hour(s))   GLUCOSE, POC    Collection Time: 06/08/22 11:30 AM   Result Value Ref Range    Glucose (POC) 253 (H) 65 - 117 mg/dL    Performed by [address] [place] [address], POC    Collection Time: 06/08/22  5:22 PM   Result Value Ref Range    Glucose (POC) 268 (H) 65 - 117 mg/dL    Performed by [address] [place] [address], POC    Collection Time: 06/08/22 11:31 PM   Result Value Ref Range    Glucose (POC) 186 (H) 65 - 117 mg/dL    Performed by Barbara Pearce    BLOOD GAS, ARTERIAL    Collection Time: 06/09/22  2:45 AM   Result Value Ref Range    pH 7.40 7.35 - 7.45      PCO2 35 35 - 45 mmHg    PO2 54 (L) 75 - 100 mmHg    O2 SAT 91 (L) >95 %    BICARBONATE 22 22 - 26 mmol/L    BASE DEFICIT 3.0 (H) 0 - 2 mmol/L    O2 METHOD BiPAP      FIO2 30.0 %    SET RATE 16      EPAP/CPAP/PEEP 5      High PEEP 16      SITE Right Radial      EVELIN'S TEST PASS     GLUCOSE, POC    Collection Time: 06/09/22  5:46 AM   Result Value Ref Range    Glucose (POC) 127 (H) 65 - 117 mg/dL    Performed by [address] [place] [address], POC    Collection Time: 06/09/22  9:41 AM   Result Value Ref Range    Glucose (POC) 156 (H) 65 - 117 mg/dL    Performed by Friday Lisa        Results     Procedure Component Value Units Date/Time    CULTURE, Odalis Garcia STAIN [[id number]] Collected: 06/01/22 1900    Order Status: Canceled Specimen: Wound from Foot     CULTURE, Odalis Garcia STAIN [[id number]] Collected: 06/01/22 1845    Order Status: Canceled Specimen: Wound from PEG Site     CULTURE, WOUND W Ann Salinas [[id number]] Collected: 05/30/22 1800    Order Status: Canceled Specimen: Sacral Wound            Labs:     Recent Labs     06/08/22  0351 06/07/22  0330   WBC 12.8* 15.0*   HGB 7.5* 7.8*   HCT 24.4* 25.3*    247     Recent Labs     06/08/22  0351 06/07/22  0330   * 148*   K 4.0 3.8   * 118*   CO2 23 22   BUN 48* 42*   CREA 1.14* 1.00   * 108*   CA 8.1* 9.1   MG 2.3  --    PHOS 2.2*  --      Recent Labs     06/08/22 0351 06/07/22  0330   ALT 99* 73   * 137*   TBILI 0.6 0.7   TP 7.3 7.3   ALB 2.1* 2.4*   GLOB 5.2* 4.9*     No results for input(s): INR, PTP, APTT, INREXT, INREXT in the last 72 hours. No results for input(s): FE, TIBC, PSAT, FERR in the last 72 hours. No results found for: FOL, RBCF   Recent Labs     06/09/22  0245   PH 7.40   PCO2 35   PO2 54*     No results for input(s): CPK, CKNDX, TROIQ in the last 72 hours.     No lab exists for component: CPKMB  Lab Results   Component Value Date/Time    Cholesterol, total 124 03/08/2022 07:40 AM    HDL Cholesterol 30 03/08/2022 07:40 AM    LDL,Direct 79 06/28/2021 12:00 AM    LDL, calculated 44.8 03/08/2022 07:40 AM    Triglyceride 202 (H) 05/16/2022 06:20 AM    CHOL/HDL Ratio 4.1 03/08/2022 07:40 AM     Lab Results   Component Value Date/Time    Glucose (POC) 156 (H) 06/09/2022 09:41 AM    Glucose (POC) 127 (H) 06/09/2022 05:46 AM    Glucose (POC) 186 (H) 06/08/2022 11:31 PM    Glucose (POC) 268 (H) 06/08/2022 05:22 PM    Glucose (POC) 253 (H) 06/08/2022 11:30 AM     Lab Results   Component Value Date/Time    Color Yellow/Straw 05/31/2022 05:56 AM    Appearance Turbid (A) 05/31/2022 05:56 AM    Specific gravity 1.006 05/31/2022 05:56 AM    pH (UA) 5.0 05/31/2022 05:56 AM    Protein 30 (A) 05/31/2022 05:56 AM    Glucose Negative 05/31/2022 05:56 AM    Ketone Negative 05/31/2022 05:56 AM    Bilirubin Negative 05/31/2022 05:56 AM    Urobilinogen 0.1 05/31/2022 05:56 AM    Nitrites Negative 05/31/2022 05:56 AM Leukocyte Esterase Large (A) 05/31/2022 05:56 AM    Bacteria Negative 05/31/2022 05:56 AM    WBC >100 (H) 05/31/2022 05:56 AM    RBC  05/31/2022 05:56 AM         Assessment:   Acute respiratory failure extubated on BiPAP 30% O2  sacral decubitus ulcer postdebridement  Sepsis with leukocytosis elevated procalcitonin and CRP  Left foot wound  Recent removal of left great toe and second toe  CVA with PEG tube placement  History of aspiration pneumonia  History of chronic kidney disease  CAD with ejection fraction about 20 to 25%  Hypertension  Hyperlipidemia  Anemia of chronic disease  Hyperkalemia  Static post transfusion  Uncontrolled diabetes  Hepatic shock/improving  Coagulopathy  Elevated  Troponin  Bacteremia with coagulase-negative Staphylococcus  Sacral wound secondary to Acinetobacter  and Enterococcus  Procedure:  1. Left transmetatarsal amputation. 2.  Sacral wound excisional wound debridement 13 x 15 cm to the bone.     Hypotension off  Levophed  Hypokalemia/replace potassium  Severe protein calorie malnutrition  Plan:       Unasyn 3 g IV every 8 hours  Aspirin 81 mg daily  FiberCon 625 mg daily  Questran 4 g twice a day  Lovenox 40 subcu daily  Pepcid 20 twice daily  Ferrous sulfate 325 mg twice a day  Flucanazole 200 mg every 24 hours  Lasix 40 mg every 12 hours  Gabapentin 300 mg twice a day  Robinul 0.1 mg every 6 hours  Hydralazine 12.5 mg 3 times a day  Lantus 10 units subcu at bedtime  Lantus 50 units subcu daily  Lopressor 12.5 twice daily  Entresto 1 tab twice a day  Repeat  the labs overall prognosis very poor    Detailed discussion with the patient's daughter about poor prognosis   At bedside  laparoscopic loop colostomy for fecal diversion canceled    Monitor H&H    Continue current medications    Discussed with ICU nurse      Had family meeting with patient's daughter and nephew and ICU  nursing director    Discussed about comfort care and hospice/waiting for decision    Waiting for family decision regarding hospice and discharge planning     Current Facility-Administered Medications:     Vancomycin random  level to be drawn on 6/9/22 at 1000., , Other, Dany Staples MD    calcium polycarbophiL (FIBERCON) tablet 625 mg, 1 Tablet, Oral, DAILY, Israel Soto MD, 625 mg at 06/09/22 0801    ferrous sulfate tablet 325 mg, 1 Tablet, Oral, BID, Israel Soto MD, 325 mg at 06/09/22 0800    furosemide (LASIX) injection 40 mg, 40 mg, IntraVENous, Q12H, Anais Fernandes MD, 40 mg at 06/09/22 0800    glycopyrrolate (ROBINUL) injection 0.1 mg, 0.1 mg, IntraVENous, Q6H, Anais Fernandes MD, 0.1 mg at 06/09/22 8349    metoprolol tartrate (LOPRESSOR) tablet 12.5 mg, 12.5 mg, Per NG tube, BID, Roberta Bird MD, 12.5 mg at 06/09/22 0801    alteplase (CATHFLO) injection 2 mg, 2 mg, InterCATHeter, PRN, Roberta Bird MD, 2 mg at 06/03/22 1708    fluconazole (DIFLUCAN) 200mg/100 mL IVPB (premix), 200 mg, IntraVENous, Q24H, Sheryl Chen MD, Last Rate: 100 mL/hr at 06/09/22 0801, 200 mg at 06/09/22 0801    aspirin chewable tablet 81 mg, 81 mg, Per G Tube, DAILY, Israel Soto MD, 81 mg at 06/09/22 0801    NOREPINephrine (LEVOPHED) 8 mg in 0.9% NS 250ml infusion, 0.5-16 mcg/min, IntraVENous, TITRATE, Israel Soto MD, Paused at 05/29/22 2046    sodium chloride (NS) flush 5-40 mL, 5-40 mL, IntraVENous, Q8H, Jeanmarie Matthew MD, 10 mL at 06/09/22 0525    sodium chloride (NS) flush 5-40 mL, 5-40 mL, IntraVENous, PRN, Crystal Blair MD, 10 mL at 05/31/22 2213    [DISCONTINUED] ondansetron (ZOFRAN ODT) tablet 4 mg, 4 mg, Oral, Q8H PRN **OR** ondansetron (ZOFRAN) injection 4 mg, 4 mg, IntraVENous, Q6H PRN, Hazel Bob MD    enoxaparin (LOVENOX) injection 40 mg, 40 mg, SubCUTAneous, DAILY, Hazel Bob MD, 40 mg at 06/09/22 0801    insulin lispro (HUMALOG) injection, , SubCUTAneous, Q6H, Hazel Bob MD, 1 Units at 06/09/22 0000    glucose chewable tablet 16 g, 4 Tablet, Oral, PRN, Yarelis Bob MD    glucagon [facility]) injection 1 mg, 1 mg, IntraMUSCular, PRN, Yarelis Bob MD    hydrALAZINE (APRESOLINE) tablet 12.5 mg, 12.5 mg, Oral, TID, Yarelis Bob MD, 12.5 mg at 06/09/22 0801    sacubitriL-valsartan (ENTRESTO) 24-26 mg tablet 1 Tablet, 1 Tablet, Oral, Q12H, Yarelis Bob MD, 1 Tablet at 06/09/22 0800    ampicillin-sulbactam (UNASYN) 3 g in 0.9% sodium chloride (MBP/ADV) 100 mL MBP, 3 g, IntraVENous, Q8H, Yarelis Bob MD, Last Rate: 200 mL/hr at 06/09/22 0525, 3 g at 06/09/22 0525    dextrose 10% infusion 0-250 mL, 0-250 mL, IntraVENous, PRN, Yarelis Bob MD, 125 mL at 05/19/22 0537    insulin glargine (LANTUS) injection 10 Units, 10 Units, SubCUTAneous, QHS, Yarelis Bob MD, 10 Units at 06/08/22 2200    propofol (DIPRIVAN) 10 mg/mL infusion, 0-50 mcg/kg/min, IntraVENous, TITRATE, Yarelis Bob MD, Last Rate: 9.5 mL/hr at 06/03/22 0051, 20 mcg/kg/min at 06/03/22 0051    0.9% sodium chloride infusion 250 mL, 250 mL, IntraVENous, PRN, Yarelis Bob MD, Last Rate: 15 mL/hr at 05/22/22 0820, Rate Verify at 05/22/22 0820    Vancomycin Pharmacy Dosing, , Other, Rx Dosing/Monitoring, Yarelis Bob MD    acetaminophen (TYLENOL) tablet 650 mg, 650 mg, Oral, Q6H PRN, 650 mg at 05/22/22 0556 **OR** acetaminophen (TYLENOL) suppository 650 mg, 650 mg, Rectal, Q6H PRN, Yarelis Bob MD, 650 mg at 05/16/22 2223    polyethylene glycol (MIRALAX) packet 17 g, 17 g, Oral, DAILY PRN, Yarelis Bob MD    ondansetron (ZOFRAN ODT) tablet 4 mg, 4 mg, Oral, Q8H PRN **OR** [DISCONTINUED] ondansetron (ZOFRAN) injection 4 mg, 4 mg, IntraVENous, Q6H PRN, Yarelis Bob MD    famotidine (PEPCID) tablet 20 mg, 20 mg, Oral, BID, Yarelis Bob MD, 20 mg at 06/09/22 0800    acidophilus-pectin, citrus tablet 2 Tablet, 2 Tablet, Oral, DAILY, Yarelis Bob MD, 2 Tablet at 06/09/22 0800    albuterol-ipratropium (DUO-NEB) 2.5 MG-0.5 MG/3 ML, 3 mL, Nebulization, Q6H PRN, Codi Bob MD    artificial saliva (MOUTH KOTE) 1 Spray, 1 Spray, Oral, TID, Codi Bob MD, 1 [place] at 06/09/22 0816    brimonidine (ALPHAGAN) 0.2 % ophthalmic solution 1 Drop, 1 Drop, Both Eyes, TID, Codi Bob MD, 1 Drop at 06/09/22 0816    cholestyramine-aspartame (QUESTRAN LIGHT) packet 4 g, 4 g, Oral, BID WITH MEALS, Codi Bob MD, 4 g at 06/09/22 0800    [Held by provider] fenofibrate nanocrystallized (TRICOR) tablet 48 mg, 48 mg, Oral, DAILY, Codi Bob MD, 48 mg at 05/19/22 4661    gabapentin (NEURONTIN) capsule 300 mg, 300 mg, Oral, BID, Codi Bbo MD, 300 mg at 06/09/22 0800    insulin glargine (LANTUS) injection 50 Units, 50 Units, SubCUTAneous, DAILY, Codi Bob MD, 50 Units at 06/08/22 1026    latanoprost (XALATAN) 0.005 % ophthalmic solution 1 Drop, 1 Drop, Right Eye, QPM, oCdi Bob MD, 1 Drop at 06/08/22 1814    traMADoL (ULTRAM) tablet 25 mg, 25 mg, Oral, Q12H PRN, Codi Bob MD, 25 mg at 06/08/22 1234

## 2022-06-10 NOTE — PROGRESS NOTES
Progress Note    Patient: Allyson Curtis MRN: [id number]  SSN: [id number]    YOB: 1945  Age: 78 y.o. Sex: female      Admit Date: 5/14/2022    LOS: 27 days     Subjective:   Patient followed for sacral wound infection with repeat culture growing Acinetobacter and Enterococci. Left foot wound also grew Acinetobacter, now status post left TMA. WBC now decreasing along with CRP and procal.  Urinalysis showing marked pyuria and yeasts for which she is on Fluconazole. She is currently on Vancomycin and Unasyn. Patient has now been extubated. She is awake but nonverbal.  CXR unchanged today. Family member at bedside states that they had a meeting earlier today and decision was made to place her on hospice and taken home later this week. Objective:     Vitals:    06/10/22 0800 06/10/22 0810 06/10/22 0900 06/10/22 1000   BP: (!) 140/60  (!) 132/58 (!) 138/57   Pulse: 75  67 66   Resp: 22  23 22   Temp:       SpO2: 100% 100% 100% 100%   Weight:       Height:            Intake and Output:  Current Shift: No intake/output data recorded. Last three shifts: 06/08 1901 - 06/10 0700  In: 3130 [I.V.:100]  Out: 3100 [Urine:3100]    Physical Exam:    Vitals and nursing note reviewed. Constitutional:       General: She is not in acute distress. Appearance: She is ill-appearing. HENT: BIPAP    Eyes: open        Cardiovascular:      Rate and Rhythm: Normal rate and regular rhythm. Heart sounds: No murmur heard. Pulmonary: clear bilaterally    Abdominal:      General: Bowel sounds are normal.      Palpations: Abdomen is soft. Tenderness: There is no abdominal tenderness. Comments: PEG site unremarkable today      Genitourinary:     Comments: Indwelling Bell             Musculoskeletal:      Cervical back: Neck supple. Right lower leg: No edema. Left lower leg: No edema.  No     Comments: Left foot surgical wound now shows dehiscence without drainage today, distal foot is dusky  Skin:     Findings: No rash. Comments: Sacral wound unchanged  Neurological: somnolent  Psychiatric:  somnolent     Lab/Data Review:     WBC 13,500    ALT/AST 55 /14  Urinalysis with WBC >100, Yeasts, Bacteria negative    Procal 0.84 <0.64 <0.31 <0.26 <0.24 <0.31 <0.36  <0.59 <0.57  CRP 14.20 < 13.60 < 14.60 <11.40 <12.60  <18.90 <17.90    Blood cultures (5/14) Coagulase negative Staphylococci  Sacral wound (5/14) Acinetobacter baumannii sensitive to Unasyn, Cefepime, Ceftazidime and Enterococcus faecium resistant to Ampicillin  Sacral wound (5/23) Mixed skin yasmine FINAL  Left great toe (5/15) Acinetobacter baumannii  Sputum culture (5/19) Rare normal respiratory yasmine FINAL     CXR (6/7)  Asymmetric left greater than right central interstitial edema perhaps mildlyreduced compared to prior imaging. Assessment:     Active Problems:    Sacral ulcer ([address] (5/14/2022)    1. Sacral wound infection secondary now to Acinetobacter baumannii and Enterococcus faecium, on Vancomycin and Unasyn, status post excisional wound debridement to the bone, Day #21 Vancomycin and Day #18 Unasyn. 2. Sepsis with persistent leukocytosis, elevated procal and CRP  3. Left foot wound, culture growing Acinetobacter baumannii, status post TMA  4. Possible ischemic hepatitiis with transaminitis following hypotensive episode, resolving  5. Positive blood cultures for Coagulase negative Staphylococci, probable contaminant  6. Hepatitis C antibody positive, resolved (negative HCV viral level)  7. New onset cardiomyopathy  8. Candida UTI with marked pyuria and yeasts, Day #10 IV Fluconazole  9. ? Hospice planned    Comment:  WBC, CRP and procal remain elevated. Plan:   1. Continue IV Unasyn and Vancomycin (for E. Faecium) unless placed on hospice  2.  Continue Fluconazole 200 mg IV daily for 4 more days or until placed on hospice       Signed By: Jose Mortensen MD     Vivien 10, 2022

## 2022-06-10 NOTE — PROGRESS NOTES
Problem: Falls - Risk of  Goal: *Absence of Falls  Description: Document Mackenzie Hamm Fall Risk and appropriate interventions in the flowsheet. Outcome: Progressing Towards Goal  Note: Fall Risk Interventions:  Mobility Interventions: Bed/chair exit alarm    Mentation Interventions: Bed/chair exit alarm    Medication Interventions: Bed/chair exit alarm    Elimination Interventions: Bed/chair exit alarm              Problem: Patient Education: Go to Patient Education Activity  Goal: Patient/Family Education  Outcome: Progressing Towards Goal     Problem: Pressure Injury - Risk of  Goal: *Prevention of pressure injury  Description: Document Shakir Scale and appropriate interventions in the flowsheet. Outcome: Progressing Towards Goal  Note: Pressure Injury Interventions:  Sensory Interventions: Assess changes in LOC,Float heels,Minimize linen layers,Pressure redistribution bed/mattress (bed type),Check visual cues for pain    Moisture Interventions: Absorbent underpads    Activity Interventions: Assess need for specialty bed    Mobility Interventions: Float heels    Nutrition Interventions: Document food/fluid/supplement intake    Friction and Shear Interventions: HOB 30 degrees or less                Problem: Patient Education: Go to Patient Education Activity  Goal: Patient/Family Education  Outcome: Progressing Towards Goal     Problem: Impaired Skin Integrity/Pressure Injury Treatment  Goal: *Prevention of pressure injury  Description: Document Shakir Scale and appropriate interventions in the flowsheet.   Outcome: Progressing Towards Goal  Note: Pressure Injury Interventions:  Sensory Interventions: Assess changes in LOC,Float heels,Minimize linen layers,Pressure redistribution bed/mattress (bed type),Check visual cues for pain    Moisture Interventions: Absorbent underpads    Activity Interventions: Assess need for specialty bed    Mobility Interventions: Float heels    Nutrition Interventions: Document food/fluid/supplement intake    Friction and Shear Interventions: HOB 30 degrees or less                Problem: Patient Education: Go to Patient Education Activity  Goal: Patient/Family Education  Outcome: Progressing Towards Goal     Problem: Patient Education: Go to Patient Education Activity  Goal: Patient/Family Education  Outcome: Progressing Towards Goal     Problem: Risk for Spread of Infection  Goal: Prevent transmission of infectious organism to others  Description: Prevent the transmission of infectious organisms to other patients, staff members, and visitors.   Outcome: Progressing Towards Goal     Problem: Patient Education:  Go to Education Activity  Goal: Patient/Family Education  Outcome: Progressing Towards Goal     Problem: Hypertension  Goal: *Blood pressure within specified parameters  Outcome: Progressing Towards Goal     Problem: Patient Education: Go to Patient Education Activity  Goal: Patient/Family Education  Outcome: Progressing Towards Goal

## 2022-06-10 NOTE — PROGRESS NOTES
IMPRESSION:   1. Acute hypoxic respiratory failure extubated patient is now on 3 L nasal cannula she was on BiPAP machine   2. Pulmonary edema mild  3. Aspiration chronically  4. Sacral decubiti ulcer with Acinetobacter and Enterococcus  5. Severe sepsis with septic shock resolved on no pressors  6. Left foot wound  7. Candidal UTI  8. Transaminitis   9. Shock liver resolved  10. Hypokalemia repleted  11. Symptomatic anemia stable after transfusion  12. Cardiomyopathy ejection fraction 50 to 55%  13. Mild pulmonary edema      RECOMMENDATIONS/PLAN:   1. ICU monitoring  1. Extubated on 6/3 worsening tachypnea she was placed on noninvasive ventilator likely recurrent aspiration and fluid overload, she is on noninvasive ventilator BiPAP machine switched to nasal cannula  2. Continue with Lasix  3. Drop in hemoglobin will continue to follow  4. New onset cardiomyopathy with the Lasix chest x-ray shows improvement in the pleural effusion  5. Persistent infection on Unasyn and vancomycin  6. Chest x-ray shows worsening of the congestion  7. Patient underwent on 5/23 left metatarsal amputation and sacral wound debridement  8. Severe sepsis with decubiti ulcer   9. Patient is on Unasyn  10. Chest x-ray shows mild congestive changes with fluid in the minor fissure     [x] High complexity decision making was performed  [x] See my orders for details  HPI   81-year-old lady came in because of leg pain past medical history of hypertension diabetes mellitus peripheral vascular disease CVA she is bedbound she has leg wound and also has sacral decubiti ulcer and she was scheduled for laparoscopic colostomy and sacral wound debridement and amputation of the left tarsometatarsal because of wound infection but her condition got worse her liver enzyme was elevated INR was also elevated she was intubated transferred to ICU was getting vancomycin and Unasyn started on Levophed because of low blood pressure.     PMH:  has a past medical history of Anemia, Chronic kidney disease, Diabetes mellitus ([facility] Hemiplegia affecting dominant side, post-stroke ([facility] [address] (05/04/2022), HTN (hypertension), Hyperkalemia, Hyperlipidemia, Ill-defined condition, Neuropathy, PAD (peripheral artery disease) ([facility] [address] Sciatica, Stroke ([facility] [address] and UTI (urinary tract infection). PSH:   has a past surgical history that includes hx other surgical (Bilateral); hx amputation (Right); hx other surgical; hx cervical fusion; hx vascular stent (Bilateral); hx vascular stent (Bilateral); hx gi; and ir insert non tunl cvc over 5 yrs (5/27/2022). FHX: family history includes Cancer in her mother; Diabetes in her mother; Hypertension in her mother. SHX:  reports that she has never smoked. She has never used smokeless tobacco. She reports previous alcohol use. She reports that she does not use drugs.     ALL: No Known Allergies     MEDS:   [x] Reviewed - As Below   [] Not reviewed    Current Facility-Administered Medications   Medication    vancomycin (VANCOCIN) 500 mg in 0.9% sodium chloride (MBP/ADV) 100 mL MBP    [START ON 6/11/2022] VANCOMYCIN RANDOM LAB REMINDER    calcium polycarbophiL (FIBERCON) tablet 625 mg    ferrous sulfate tablet 325 mg    furosemide (LASIX) injection 40 mg    glycopyrrolate (ROBINUL) injection 0.1 mg    metoprolol tartrate (LOPRESSOR) tablet 12.5 mg    alteplase (CATHFLO) injection 2 mg    fluconazole (DIFLUCAN) 200mg/100 mL IVPB (premix)    aspirin chewable tablet 81 mg    sodium chloride (NS) flush 5-40 mL    sodium chloride (NS) flush 5-40 mL    ondansetron (ZOFRAN) injection 4 mg    enoxaparin (LOVENOX) injection 40 mg    insulin lispro (HUMALOG) injection    glucose chewable tablet 16 g    glucagon (GLUCAGEN) injection 1 mg    hydrALAZINE (APRESOLINE) tablet 12.5 mg    sacubitriL-valsartan (ENTRESTO) 24-26 mg tablet 1 Tablet    ampicillin-sulbactam (UNASYN) 3 g in 0.9% sodium chloride (MBP/ADV) 100 mL MBP    dextrose 10% infusion 0-250 mL    insulin glargine (LANTUS) injection 10 Units    0.9% sodium chloride infusion 250 mL    Vancomycin Pharmacy Dosing    acetaminophen (TYLENOL) tablet 650 mg    Or    acetaminophen (TYLENOL) suppository 650 mg    polyethylene glycol (MIRALAX) packet 17 g    ondansetron (ZOFRAN ODT) tablet 4 mg    famotidine (PEPCID) tablet 20 mg    acidophilus-pectin, citrus tablet 2 Tablet    albuterol-ipratropium (DUO-NEB) 2.5 MG-0.5 MG/3 ML    artificial saliva (MOUTH KOTE) 1 Spray    brimonidine (ALPHAGAN) 0.2 % ophthalmic solution 1 Drop    cholestyramine-aspartame (QUESTRAN LIGHT) packet 4 g    [Held by provider] fenofibrate nanocrystallized (TRICOR) tablet 48 mg    gabapentin (NEURONTIN) capsule 300 mg    insulin glargine (LANTUS) injection 50 Units    latanoprost (XALATAN) 0.005 % ophthalmic solution 1 Drop    traMADoL (ULTRAM) tablet 25 mg      MAR reviewed and pertinent medications noted or modified as needed   Current Facility-Administered Medications   Medication    vancomycin (VANCOCIN) 500 mg in 0.9% sodium chloride (MBP/ADV) 100 mL MBP    [START ON 6/11/2022] VANCOMYCIN RANDOM LAB REMINDER    calcium polycarbophiL (FIBERCON) tablet 625 mg    ferrous sulfate tablet 325 mg    furosemide (LASIX) injection 40 mg    glycopyrrolate (ROBINUL) injection 0.1 mg    metoprolol tartrate (LOPRESSOR) tablet 12.5 mg    alteplase (CATHFLO) injection 2 mg    fluconazole (DIFLUCAN) 200mg/100 mL IVPB (premix)    aspirin chewable tablet 81 mg    sodium chloride (NS) flush 5-40 mL    sodium chloride (NS) flush 5-40 mL    ondansetron (ZOFRAN) injection 4 mg    enoxaparin (LOVENOX) injection 40 mg    insulin lispro (HUMALOG) injection    glucose chewable tablet 16 g    glucagon (GLUCAGEN) injection 1 mg    hydrALAZINE (APRESOLINE) tablet 12.5 mg    sacubitriL-valsartan (ENTRESTO) 24-26 mg tablet 1 Tablet    ampicillin-sulbactam (UNASYN) 3 g in 0.9% sodium chloride (MBP/ADV) 100 mL MBP  dextrose 10% infusion 0-250 mL    insulin glargine (LANTUS) injection 10 Units    0.9% sodium chloride infusion 250 mL    Vancomycin Pharmacy Dosing    acetaminophen (TYLENOL) tablet 650 mg    Or    acetaminophen (TYLENOL) suppository 650 mg    polyethylene glycol (MIRALAX) packet 17 g    ondansetron (ZOFRAN ODT) tablet 4 mg    famotidine (PEPCID) tablet 20 mg    acidophilus-pectin, citrus tablet 2 Tablet    albuterol-ipratropium (DUO-NEB) 2.5 MG-0.5 MG/3 ML    artificial saliva (MOUTH KOTE) 1 Spray    brimonidine (ALPHAGAN) 0.2 % ophthalmic solution 1 Drop    cholestyramine-aspartame (QUESTRAN LIGHT) packet 4 g    [Held by provider] fenofibrate nanocrystallized (TRICOR) tablet 48 mg    gabapentin (NEURONTIN) capsule 300 mg    insulin glargine (LANTUS) injection 50 Units    latanoprost (XALATAN) 0.005 % ophthalmic solution 1 Drop    traMADoL (ULTRAM) tablet 25 mg      PMH:  has a past medical history of Anemia, Chronic kidney disease, Diabetes mellitus ([address] Hemiplegia affecting dominant side, post-stroke ([address] (05/04/2022), HTN (hypertension), Hyperkalemia, Hyperlipidemia, Ill-defined condition, Neuropathy, PAD (peripheral artery disease) ([address] Sciatica, Stroke ([address] and UTI (urinary tract infection). PSH:   has a past surgical history that includes hx other surgical (Bilateral); hx amputation (Right); hx other surgical; hx cervical fusion; hx vascular stent (Bilateral); hx vascular stent (Bilateral); hx gi; and ir insert non tunl cvc over 5 yrs (5/27/2022). FHX: family history includes Cancer in her mother; Diabetes in her mother; Hypertension in her mother. SHX:  reports that she has never smoked. She has never used smokeless tobacco. She reports previous alcohol use. She reports that she does not use drugs.      ROS:  Unable to obtain intubated on ventilator    Hemodynamics:    CO:    CI:    CVP:    SVR:   PAP Systolic:    PAP Diastolic:    PVR:    [id number]:        Ventilator Settings: Mode Rate TV Press PEEP FiO2 PIP Min. Vent   Pressure support,Spontaneous    500 ml 15 cm H2O  5 cm H20 32 %  27 cm H2O  13.2 l/min        Vital Signs: Telemetry:    normal sinus rhythm Intake/Output:   Visit Vitals  BP (!) 148/55 (BP 1 Location: Right upper arm, BP Patient Position: At rest)   Pulse 73   Temp 99 °F (37.2 °C)   Resp 20   Ht 5' 6.93\" (1.7 m)   Wt 95.1 kg (209 lb 10.5 oz)   SpO2 100%   Breastfeeding No   BMI 32.91 kg/m²       Temp (24hrs), Av.3 °F (37.4 °C), Min:99 °F (37.2 °C), Max:99.8 °F (37.7 °C)        O2 Device: Nasal cannula O2 Flow Rate (L/min): 3 l/min       Wt Readings from Last 4 Encounters:   22 95.1 kg (209 lb 10.5 oz)   04/10/22 86.6 kg (191 lb)   22 82.2 kg (181 lb 3.5 oz)   20 84.4 kg (186 lb)          Intake/Output Summary (Last 24 hours) at 6/10/2022 0804  Last data filed at 6/10/2022 0500  Gross per 24 hour   Intake 2125 ml   Output 2300 ml   Net -175 ml       Last shift:      No intake/output data recorded. Last 3 shifts:  1901 - 06/10 0700  In: 3130 [I.V.:100]  Out: 3100 [Urine:3100]       Physical Exam:     General: On noninvasive ventilator weakly tries to open her eyes follows no commands  HEENT: NCAT,  Eyes: anicteric; conjunctiva clear, random eye movement when eyelids are open  Neck: no nodes, , trach midline; no accessory MM use. Neck veins obscured by obesity but seems slightly engorged  Chest: no deformity,   Cardiac: R regular; no murmur;   Lungs: Shallow breaths with diffuse wheezes and rhonchi  Abd: soft, NT, hypoactive BS  Ext: Diffuse edema; no joint swelling;  No clubbing  :clear urine  Neuro: Seems to weakly try to open her eyes follows no commands does not speak does not move her extremities  Psych-  unable to assess  Skin: warm, dry, no cyanosis;   Pulses: Brachial and radial pulses intact  Capillary: Normal capillary refill      DATA:    MAR reviewed and pertinent medications noted or modified as needed  MEDS:   Current Facility-Administered Medications   Medication    vancomycin (VANCOCIN) 500 mg in 0.9% sodium chloride (MBP/ADV) 100 mL MBP    [START ON 6/11/2022] VANCOMYCIN RANDOM LAB REMINDER    calcium polycarbophiL (FIBERCON) tablet 625 mg    ferrous sulfate tablet 325 mg    furosemide (LASIX) injection 40 mg    glycopyrrolate (ROBINUL) injection 0.1 mg    metoprolol tartrate (LOPRESSOR) tablet 12.5 mg    alteplase (CATHFLO) injection 2 mg    fluconazole (DIFLUCAN) 200mg/100 mL IVPB (premix)    aspirin chewable tablet 81 mg    sodium chloride (NS) flush 5-40 mL    sodium chloride (NS) flush 5-40 mL    ondansetron (ZOFRAN) injection 4 mg    enoxaparin (LOVENOX) injection 40 mg    insulin lispro (HUMALOG) injection    glucose chewable tablet 16 g    glucagon (GLUCAGEN) injection 1 mg    hydrALAZINE (APRESOLINE) tablet 12.5 mg    sacubitriL-valsartan (ENTRESTO) 24-26 mg tablet 1 Tablet    ampicillin-sulbactam (UNASYN) 3 g in 0.9% sodium chloride (MBP/ADV) 100 mL MBP    dextrose 10% infusion 0-250 mL    insulin glargine (LANTUS) injection 10 Units    0.9% sodium chloride infusion 250 mL    Vancomycin Pharmacy Dosing    acetaminophen (TYLENOL) tablet 650 mg    Or    acetaminophen (TYLENOL) suppository 650 mg    polyethylene glycol (MIRALAX) packet 17 g    ondansetron (ZOFRAN ODT) tablet 4 mg    famotidine (PEPCID) tablet 20 mg    acidophilus-pectin, citrus tablet 2 Tablet    albuterol-ipratropium (DUO-NEB) 2.5 MG-0.5 MG/3 ML    artificial saliva (MOUTH KOTE) 1 Spray    brimonidine (ALPHAGAN) 0.2 % ophthalmic solution 1 Drop    cholestyramine-aspartame (QUESTRAN LIGHT) packet 4 g    [Held by provider] fenofibrate nanocrystallized (TRICOR) tablet 48 mg    gabapentin (NEURONTIN) capsule 300 mg    insulin glargine (LANTUS) injection 50 Units    latanoprost (XALATAN) 0.005 % ophthalmic solution 1 Drop    traMADoL (ULTRAM) tablet 25 mg        Labs:    Recent Labs     06/10/22  0445 06/08/22  8145 WBC 13.5* 12.8*   HGB 7.9* 7.5*    227     Recent Labs     06/10/22  0445 22  0351   * 146*   K 3.5 4.0   * 119*   CO2 24 23   * 263*   BUN 44* 48*   CREA 0.92 1.14*   CA 9.3 8.1*   MG 2.2 2.3   PHOS 3.1 2.2*   ALB 1.9* 2.1*   ALT 67 99*     Recent Labs     06/10/22  0440 22  0245   PH 7.41 7.40   PCO2 37 35   PO2 93 54*   HCO3 23 22   FIO2  --  30.0   6/5 and IV IP 16 EP 5 rate 16 FiO2 30%    AC 12  PEEP 5 FiO2 30%   echo ejection fraction 20% mild mitral regurg left atrium 3.5 cm RVSP 35  , 1313, 1361    Lab Results   Component Value Date/Time    Culture result: Heavy  Mixed skin yasmine isolated   2022 06:15 PM    Culture result: Rare Normal respiratory yasmine 2022 02:48 PM    Culture result: Heavy Acinetobacter baumannii complex 05/15/2022 07:15 PM    Culture result: Heavy Diphtheroids 05/15/2022 07:15 PM     Lab Results   Component Value Date/Time    TSH 2.880 2016 10:13 AM        Imagin/22 chest x-ray with ET tube in place hazy accentuated basilar markings with small amount of fluid in the minor fissure  Results from Hospital Encounter encounter on 22    XR CHEST PORT    Narrative  Chest single view. Comparison single view chest 2022. Removal right CVC. Patchy reticular markings same distribution through lungs. Cardiac and  mediastinal structures unchanged noting thoracic aorta atherosclerosis. No  pneumothorax or pleural effusion. Cervical hardware. Results from [place] encounter on 22    CT ABD PELV WO CONT    Narrative  CT ABD PELV WO CONT    Technique: Noncontrasted CT scan of the abdomen and pelvis was performed  utilizing transaxial images obtained from the dome of the diaphragm to the pubic  symphysis. Coronal and sagittal reconstructions were generated.     Dose Reduction:  All CT scans at this facility are performed using dose reduction optimization  techniques as appropriate to a performed exam including the following: Automated  exposure control, adjustments of the mA and/or kV according to patient size, or  use of iterative reconstruction technique. Comparison:  2/25/2022. Findings:  Small-moderate pleural effusions are present with bibasilar  atelectasis. Atherosclerotic calcifications are again evident including coronary  artery calcifications. Gastrostomy tube tip in the gastric antrum. The liver, spleen, pancreas, and  adrenal glands are unremarkable for noncontrasted technique. Stable lobulated  kidneys. Negative for hydronephrosis. Gallbladder is unremarkable. No biliary  duct dilatation apparent. The abdominal aorta is normal in caliber. There is no evidence of bowel obstruction. Small volume ascites has developed. The urinary bladder is decompressed by a Bell catheter. The uterus is  unremarkable. The appendix is normal.    There is mild body wall edema. No suspicious lytic or blastic lesion evident. Impression  Third spacing of fluids with small volume ascites, small-moderate  pleural effusions, and mild body wall edema. Bibasilar atelectasis. 5/20 intubated on ventilator we will continue current vent settings patient is supposed to go for surgery because of transaminitis elevated liver enzyme we will wait as per surgeon for sacral wound debridement and diverting loop colostomy off Levophed, improvement in AST and ALT  5/21 continue with the current vent settings ABG acceptable liver enzyme improving awaiting for the surgery  5/22 maintain ventilator as is in anticipation of surgery.   Renal function improved mild congestion on chest x-ray we will give 1 dose Lasix and potassium  5/23 patient is going for surgery today we will leave ventilator as it is INR is 1.5  5/25 patient remained on ventilator intubated doing well, patient received vitamin K fresh frozen plasma schedule for laparoscopic loop colostomy possible today  5/26 no surgery has been plans will start weaning do sedation vacation  5/27 try to do sedation vacation to wean patient blood pressure dropped still on Levophed we will continue to wean discussed with the family at bedside  5/28 remains on ventilator sedated and also on Levophed possible surgery next week  5/29 on ventilator ET tube advisement 1 to 1.5 cm we will repeat chest x-ray ABG acceptable  5/30 continue with current vent setting  5/31 awaiting for possible surgery if not we will start weaning  6/1 remain intubated on ventilator surgery has been delayed we will start weaning and plan for extubation  6/2 no surgery was planned so we will try to wean she is off Levophed we will do sedation vacation and if weaning cardio acceptable will extubate  6/3 change vent settings to spontaneous if weaning creatinine acceptable will extubate  6/4 extubated on 6/3 now on nasal cannula tolerating well  6/5 respiratory distress last night placed on noninvasive ventilator chest x-ray continues to show fluid overload. Will increase Lasix to 40 mg twice daily give albumin today. Likely has continued aspiration. Will increase Robinul to every 6 hours dosing  6/6 patient is alert awake he is full code we will try to wean BiPAP put on nasal cannula and keep BiPAP at night  6/7 noninvasive ventilator Machine we will change it to high flow nasal cannula  6/8 on noninvasive ventilator BiPAP machine chest x-ray shows improvement overall condition prognosis poor  6/10 transition from BiPAP to nasal cannula.   3 L ABG acceptable  Time of care 30 minutes

## 2022-06-10 NOTE — PROGRESS NOTES
Will follow up w/patient's daughter Blanquita Lambert re: if family has made a decision for hospice referrals to be sent on patient's behalf. Patient has transfer orders out of ICU.          DOV Villagran

## 2022-06-10 NOTE — PROGRESS NOTES
General Daily Progress Note          Patient Name:   Malinda Rosado       YOB: 1945       Age:  78 y.o. Admit Date: 5/14/2022      Subjective:     78years old female with significant past medical history of CVA with PEG tube chronic kidney disease CVA with right-sided weakness bedbound DVT of the left great toe status post removal of the left great and second toe history of aspiration pneumonia history of sacral decubitus ulcer patient was recently discharged from the hospitalPatient discharged to nursing home upon p.o. Cipro    Admitted again yesterday concerning for worsening of sacral ulcer    During last admission patient was seen by infectious disease and also seen by the vascular surgeon patient had debridement of wound during the last admission    Patient seen by the infectious disease yesterday    Sacral wound infection recent cultures growing Pseudomonas resistant to Zosyn and meropenem    5/17    Patient alert awake not in distress  Patient was seen by the vascular surgeon    Vascular surgery planned for laparoscopic loop colostomy placement and sacral debridement then wound vacuum  Also try to close the wound on the left foot with TMA    5/18    Resting in the bed not in any distress  Blood sugars running high    Seen by infectious disease continue vancomycin and start on Unasyn    5/19    And went to the OR intubated because of elevated LFT and elevated INR  Surgery was canceled    On ventilator 40% O2  Propofol drip    5/20    Patient on ventilator with 30% O2  On propofol drip    Hemoglobin dropped to 6.8    Patient's daughter at the bedside    5/20    Patient on ventilator 30% O2  On propofol drip  Getting PEG tube feeding    5/21    Patient still on ventilator 30% O2  On propofol drip  Liver Function improving    5/22  On ventilator with 30% O2 on propofol drip    5/24  Awaiting tissue culture results.    On ventilator with 30% O2 on propofol drip  Left transmetatarsal amputation and Sacral wound excisional wound debridement 13 x 15 cm to the bone completed yesterday. CXR: Hazy mid and lower lung reticular markings.  Dependent small to moderate volume,  right greater than left pleural effusions  Remarkable labs   WBC: 15.2   Hgb: 8.5   Na; 148  CRP: 13.60   Procal: 0.71      5/25    Patient on ventilator with 30% O2  Seen by the vascular surgeon and plan for surgery today    5/26  Patient on ventilator with 30% O2  Hypotensive on Levophed  On propofol drip    Surgery was canceled yesterday because patient unstable hemodynamically not cleared by the anesthesia    5/27    Patient on ventilator with 30% O2  Hypotensive on Levophed  Getting PEG tube feeding    5/30     Patient on ventilator with 21% O2  On propofol drip  Off pressor    5/31    Patient on ventilator with 21% O2  On propofol drip    Off pressor  Patient have a wound vacuum      6/1    Patient scheduled for surgery today    6/2    Surgery was canceled yesterday  As overall prognosis very poor  Patient on vent on 21% O2  Getting PEG tube feeding  Wound vacuum intact      6/3    Patient still on ventilator  Seen by vascular surgeon left foot concern of ischemic changes    6/4    Patient extubated /tachypneic daughter at the bedside    6/5    Patient on BiPAP  30% O2    6/6    Patient on BiPAP 30% O2    Awake not in distress    6/7    Still on BiPAP 30% O2  Awake    6/8    Patient alert awake on BiPAP 30% O2 patient's daughter at the bedside  Still waiting for family decision regarding further treatment plan  Regarding hospice    6/9    Patient sleeping in the bed on BiPAP 30% percent O2  Wound vacuum in place    6/10    Patient off BiPAP on nasal cannula 3 L          Objective:     Visit Vitals  BP (!) 148/55 (BP 1 Location: Right upper arm, BP Patient Position: At rest)   Pulse 73   Temp 99 °F (37.2 °C)   Resp 20   Ht 5' 6.93\" (1.7 m)   Wt 95.1 kg (209 lb 10.5 oz)   SpO2 100%   Breastfeeding No   BMI 32.91 kg/m² Recent Results (from the past 24 hour(s))   GLUCOSE, POC    Collection Time: 06/09/22 11:43 AM   Result Value Ref Range    Glucose (POC) 187 (H) 65 - 117 mg/dL    Performed by Friday Lisa    GLUCOSE, POC    Collection Time: 06/09/22  5:38 PM   Result Value Ref Range    Glucose (POC) 214 (H) 65 - 117 mg/dL    Performed by Friday Lisa    GLUCOSE, POC    Collection Time: 06/10/22 12:18 AM   Result Value Ref Range    Glucose (POC) 237 (H) 65 - 117 mg/dL    Performed by Brody Linares    BLOOD GAS, ARTERIAL    Collection Time: 06/10/22  4:40 AM   Result Value Ref Range    pH 7.41 7.35 - 7.45      PCO2 37 35 - 45 mmHg    PO2 93 75 - 100 mmHg    O2 SAT 98 >95 %    BICARBONATE 23 22 - 26 mmol/L    BASE DEFICIT 1.4 0 - 2 mmol/L    O2 METHOD Nasal Cannula      O2 FLOW RATE 3 L/min    SITE Right Radial      EVELIN'S TEST PASS     CBC WITH AUTOMATED DIFF    Collection Time: 06/10/22  4:45 AM   Result Value Ref Range    WBC 13.5 (H) 3.6 - 11.0 K/uL    RBC 2.77 (L) 3.80 - 5.20 M/uL    HGB 7.9 (L) 11.5 - 16.0 g/dL    HCT 25.9 (L) 35.0 - 47.0 %    MCV 93.5 80.0 - 99.0 FL    MCH 28.5 26.0 - 34.0 PG    MCHC 30.5 30.0 - 36.5 g/dL    RDW 17.9 (H) 11.5 - 14.5 %    PLATELET 393 746 - 864 K/uL    MPV 11.7 8.9 - 12.9 FL    NRBC 1.0 (H) 0.0  WBC    ABSOLUTE NRBC 0.13 (H) 0.00 - 0.01 K/uL    NEUTROPHILS 73 32 - 75 %    LYMPHOCYTES 17 12 - 49 %    MONOCYTES 6 5 - 13 %    EOSINOPHILS 2 0 - 7 %    BASOPHILS 1 0 - 1 %    IMMATURE GRANULOCYTES 1 (H) 0 - 0.5 %    ABS. NEUTROPHILS 9.9 (H) 1.8 - 8.0 K/UL    ABS. LYMPHOCYTES 2.3 0.8 - 3.5 K/UL    ABS. MONOCYTES 0.8 0.0 - 1.0 K/UL    ABS. EOSINOPHILS 0.3 0.0 - 0.4 K/UL    ABS. BASOPHILS 0.1 0.0 - 0.1 K/UL    ABS. IMM.  GRANS. 0.1 (H) 0.00 - 0.04 K/UL    DF AUTOMATED     METABOLIC PANEL, COMPREHENSIVE    Collection Time: 06/10/22  4:45 AM   Result Value Ref Range    Sodium 150 (H) 136 - 145 mmol/L    Potassium 3.5 3.5 - 5.1 mmol/L    Chloride 120 (H) 97 - 108 mmol/L    CO2 24 21 - 32 mmol/L    Anion gap 6 5 - 15 mmol/L    Glucose 187 (H) 65 - 100 mg/dL    BUN 44 (H) 6 - 20 mg/dL    Creatinine 0.92 0.55 - 1.02 mg/dL    BUN/Creatinine ratio 48 (H) 12 - 20      GFR est AA >60 >60 ml/min/1.73m2    GFR est non-AA 60 (L) >60 ml/min/1.73m2    Calcium 9.3 8.5 - 10.1 mg/dL    Bilirubin, total 0.5 0.2 - 1.0 mg/dL    AST (SGOT) 25 15 - 37 U/L    ALT (SGPT) 67 12 - 78 U/L    Alk. phosphatase 150 (H) 45 - 117 U/L    Protein, total 7.4 6.4 - 8.2 g/dL    Albumin 1.9 (L) 3.5 - 5.0 g/dL    Globulin 5.5 (H) 2.0 - 4.0 g/dL    A-G Ratio 0.3 (L) 1.1 - 2.2     MAGNESIUM    Collection Time: 06/10/22  4:45 AM   Result Value Ref Range    Magnesium 2.2 1.6 - 2.4 mg/dL   PHOSPHORUS    Collection Time: 06/10/22  4:45 AM   Result Value Ref Range    Phosphorus 3.1 2.6 - 4.7 mg/dL   GLUCOSE, POC    Collection Time: 06/10/22  9:13 AM   Result Value Ref Range    Glucose (POC) 167 (H) 65 - 117 mg/dL    Performed by Friday Lisa      [unfilled]      Review of Systems    Not a good historian e. Physical Exam:      Constitutional: On ventilator  HENT:   Head: Normocephalic and atraumatic. Eyes: Pupils are equal, round, and reactive to light. EOM are normal.   Cardiovascular: Normal rate, regular rhythm and normal heart sounds. Pulmonary/Chest: Breath sounds normal. No wheezes. No rales. Exhibits no tenderness. Abdominal: Soft. Bowel sounds are normal. There is no abdominal tenderness. There is no rebound and no guarding. Musculoskeletal: Normal range of motion. Neurological: On ventilator   skin sacral decubitus ulcer and left foot wound right big toe amputation    XR CHEST PORT   Final Result      XR CHEST PORT   Final Result   1. There has been an improvement in the pulmonary edema pattern particularly   within the left perihilar region. There is persistent milder interstitial edema   within the right lung. 2.  This examination is negative for new pulmonary pathology or additional   interval changes. XR CHEST PORT   Final Result      XR CHEST PORT   Final Result   Pulmonary vascular congestion and predominantly perihilar opacities,   increased. XR CHEST PORT   Final Result   Worsening lung aeration. XR CHEST PORT   Final Result   No significant change. XR CHEST PORT   Final Result   No significant change. XR CHEST PORT   Final Result      XR CHEST PORT   Final Result      XR CHEST PORT   Final Result   No interval change or acute process. XR CHEST PORT   Final Result   ET tube retracted from previous, with tip now at the level of the   thoracic inlet, 8-9 cm above the jennifer. Stable appearance of right central   line. Stable mild enlargement of cardiopericardial silhouette. No evidence of   pulmonary edema, air space pneumonia, or pleural effusion. No evidence of   pneumothorax. Some structures are partially obscured by overlying monitoring   electrodes. XR CHEST PORT   Final Result   Basilar airspace disease, possibly subsegmental atelectasis, stable. XR CHEST PORT   Final Result   Cardiomegaly with vascular congestion. Stable appearance of ET tube. Right central line placed, without radiographic evidence of complication. IR INSERT NON TUNL CVC OVER 5 YRS   Final Result   Successful placement of a triple-lumen central venous catheter. The   catheter is ready for use. XR CHEST PORT   Final Result      XR CHEST PORT   Final Result      XR CHEST PORT   Final Result      CT ABD PELV WO CONT   Final Result   Third spacing of fluids with small volume ascites, small-moderate   pleural effusions, and mild body wall edema. Bibasilar atelectasis. XR CHEST PORT   Final Result   Similar findings compared to single view chest 1 day earlier. XR CHEST PORT   Final Result   Findings/IMPRESSION: Stable appearance of endotracheal tube. Stable mild   enlargement of cardiomediastinal silhouette.  Central vascular congestion with   bilateral perihilar and basilar opacities, consistent with edema and/or   pneumonia, right greater than left. Possible right pleural effusion. No   pneumothorax. XR CHEST PORT   Final Result   Bibasilar opacities, possibly increased on the right. XR CHEST PORT   Final Result   No significant change allowing for difference in technique and   positioning. Single portable AP view compared to yesterday. Suboptimal inspiratory film   compromises visualization of the lower lung fields. Monitoring equipment   overlies the body. The tip of the tube is approximately 3 cm above the jennifer. Mild apparent cardiomegaly. Left heart border and left diaphragm obscured. Hazy airspace opacification both lung bases may be a combination of atelectasis,   pneumonia, or edema. No large effusion. Negative for pneumothorax. XR CHEST PORT   Final Result   No significant change allowing for difference in technique and   positioning. Single portable AP view compared to yesterday. Rotation to the right. Monitoring   equipment overlies the body. Suboptimal inspiratory film compromises   visualization of the lower lung fields. Mild cardiomegaly. The tip of the ET tube is approximately 3 cm above the jennifer. Mild basal interstitial edema. No new consolidation. No pleural effusion or   pneumothorax. XR CHEST PORT   Final Result   1. The endotracheal tube is in satisfactory position. 2.  There has been a partial interval resolution in the pulmonary interstitial   edema pattern. XR CHEST PORT   Final Result   Ill-defined haziness in the mid to lower lung zones suspect for mild   atelectatic changes and/or interstitial fluid or pleural fluid. US ABD LTD   Final Result   1. Question pericholecystic fluid. No identified gallstones or sludge. 2. Right pleural effusion. 3. Increased right renal echotexture for medical renal disease. XR CHEST PORT   Final Result   Intubation. Findings suggesting hydrostatic edema. XR CHEST PORT   Final Result   No evidence of an acute cardiopulmonary process. IR FLUORO GUIDE PLC CVAD    (Results Pending)   IR US GUIDED VASCULAR ACCESS    (Results Pending)        Recent Results (from the past 24 hour(s))   GLUCOSE, POC    Collection Time: 06/09/22 11:43 AM   Result Value Ref Range    Glucose (POC) 187 (H) 65 - 117 mg/dL    Performed by Friday Lisa    GLUCOSE, POC    Collection Time: 06/09/22  5:38 PM   Result Value Ref Range    Glucose (POC) 214 (H) 65 - 117 mg/dL    Performed by Friday Lisa    GLUCOSE, POC    Collection Time: 06/10/22 12:18 AM   Result Value Ref Range    Glucose (POC) 237 (H) 65 - 117 mg/dL    Performed by Mckinley Cantu    BLOOD GAS, ARTERIAL    Collection Time: 06/10/22  4:40 AM   Result Value Ref Range    pH 7.41 7.35 - 7.45      PCO2 37 35 - 45 mmHg    PO2 93 75 - 100 mmHg    O2 SAT 98 >95 %    BICARBONATE 23 22 - 26 mmol/L    BASE DEFICIT 1.4 0 - 2 mmol/L    O2 METHOD Nasal Cannula      O2 FLOW RATE 3 L/min    SITE Right Radial      EVELIN'S TEST PASS     CBC WITH AUTOMATED DIFF    Collection Time: 06/10/22  4:45 AM   Result Value Ref Range    WBC 13.5 (H) 3.6 - 11.0 K/uL    RBC 2.77 (L) 3.80 - 5.20 M/uL    HGB 7.9 (L) 11.5 - 16.0 g/dL    HCT 25.9 (L) 35.0 - 47.0 %    MCV 93.5 80.0 - 99.0 FL    MCH 28.5 26.0 - 34.0 PG    MCHC 30.5 30.0 - 36.5 g/dL    RDW 17.9 (H) 11.5 - 14.5 %    PLATELET 336 804 - 324 K/uL    MPV 11.7 8.9 - 12.9 FL    NRBC 1.0 (H) 0.0  WBC    ABSOLUTE NRBC 0.13 (H) 0.00 - 0.01 K/uL    NEUTROPHILS 73 32 - 75 %    LYMPHOCYTES 17 12 - 49 %    MONOCYTES 6 5 - 13 %    EOSINOPHILS 2 0 - 7 %    BASOPHILS 1 0 - 1 %    IMMATURE GRANULOCYTES 1 (H) 0 - 0.5 %    ABS. NEUTROPHILS 9.9 (H) 1.8 - 8.0 K/UL    ABS. LYMPHOCYTES 2.3 0.8 - 3.5 K/UL    ABS. MONOCYTES 0.8 0.0 - 1.0 K/UL    ABS. EOSINOPHILS 0.3 0.0 - 0.4 K/UL    ABS. BASOPHILS 0.1 0.0 - 0.1 K/UL    ABS. IMM.  GRANS. 0.1 (H) 0.00 - 0.04 K/UL    DF AUTOMATED     METABOLIC PANEL, COMPREHENSIVE    Collection Time: 06/10/22  4:45 AM   Result Value Ref Range    Sodium 150 (H) 136 - 145 mmol/L    Potassium 3.5 3.5 - 5.1 mmol/L    Chloride 120 (H) 97 - 108 mmol/L    CO2 24 21 - 32 mmol/L    Anion gap 6 5 - 15 mmol/L    Glucose 187 (H) 65 - 100 mg/dL    BUN 44 (H) 6 - 20 mg/dL    Creatinine 0.92 0.55 - 1.02 mg/dL    BUN/Creatinine ratio 48 (H) 12 - 20      GFR est AA >60 >60 ml/min/1.73m2    GFR est non-AA 60 (L) >60 ml/min/1.73m2    Calcium 9.3 8.5 - 10.1 mg/dL    Bilirubin, total 0.5 0.2 - 1.0 mg/dL    AST (SGOT) 25 15 - 37 U/L    ALT (SGPT) 67 12 - 78 U/L    Alk.  phosphatase 150 (H) 45 - 117 U/L    Protein, total 7.4 6.4 - 8.2 g/dL    Albumin 1.9 (L) 3.5 - 5.0 g/dL    Globulin 5.5 (H) 2.0 - 4.0 g/dL    A-G Ratio 0.3 (L) 1.1 - 2.2     MAGNESIUM    Collection Time: 06/10/22  4:45 AM   Result Value Ref Range    Magnesium 2.2 1.6 - 2.4 mg/dL   PHOSPHORUS    Collection Time: 06/10/22  4:45 AM   Result Value Ref Range    Phosphorus 3.1 2.6 - 4.7 mg/dL   GLUCOSE, POC    Collection Time: 06/10/22  9:13 AM   Result Value Ref Range    Glucose (POC) 167 (H) 65 - 117 mg/dL    Performed by Friday Lisa        Results     Procedure Component Value Units Date/Time    CULTURE, Sandy Fraction STAIN [[id number]] Collected: 06/01/22 1900    Order Status: Canceled Specimen: Wound from Foot     CULTURE, Sandy Fraction STAIN [[id number]] Collected: 06/01/22 1845    Order Status: Canceled Specimen: Wound from PEG Site     CULTURE, Sandy Fraction STAIN [[id number]] Collected: 05/30/22 1800    Order Status: Canceled Specimen: Sacral Wound            Labs:     Recent Labs     06/10/22  0445 06/08/22 0351   WBC 13.5* 12.8*   HGB 7.9* 7.5*   HCT 25.9* 24.4*    227     Recent Labs     06/10/22  0445 06/08/22  0351   * 146*   K 3.5 4.0   * 119*   CO2 24 23   BUN 44* 48*   CREA 0.92 1.14*   * 263*   CA 9.3 8.1*   MG 2.2 2.3   PHOS 3.1 2.2*     Recent Labs     06/10/22  0445 06/08/22  0351   ALT 67 99* * 137*   TBILI 0.5 0.6   TP 7.4 7.3   ALB 1.9* 2.1*   GLOB 5.5* 5.2*     No results for input(s): INR, PTP, APTT, INREXT, INREXT in the last 72 hours. No results for input(s): FE, TIBC, PSAT, FERR in the last 72 hours. No results found for: FOL, RBCF   Recent Labs     06/10/22  0440 06/09/22  0245   PH 7.41 7.40   PCO2 37 35   PO2 93 54*     No results for input(s): CPK, CKNDX, TROIQ in the last 72 hours.     No lab exists for component: CPKMB  Lab Results   Component Value Date/Time    Cholesterol, total 124 03/08/2022 07:40 AM    HDL Cholesterol 30 03/08/2022 07:40 AM    LDL,Direct 79 06/28/2021 12:00 AM    LDL, calculated 44.8 03/08/2022 07:40 AM    Triglyceride 202 (H) 05/16/2022 06:20 AM    CHOL/HDL Ratio 4.1 03/08/2022 07:40 AM     Lab Results   Component Value Date/Time    Glucose (POC) 167 (H) 06/10/2022 09:13 AM    Glucose (POC) 237 (H) 06/10/2022 12:18 AM    Glucose (POC) 214 (H) 06/09/2022 05:38 PM    Glucose (POC) 187 (H) 06/09/2022 11:43 AM    Glucose (POC) 156 (H) 06/09/2022 09:41 AM     Lab Results   Component Value Date/Time    Color Yellow/Straw 05/31/2022 05:56 AM    Appearance Turbid (A) 05/31/2022 05:56 AM    Specific gravity 1.006 05/31/2022 05:56 AM    pH (UA) 5.0 05/31/2022 05:56 AM    Protein 30 (A) 05/31/2022 05:56 AM    Glucose Negative 05/31/2022 05:56 AM    Ketone Negative 05/31/2022 05:56 AM    Bilirubin Negative 05/31/2022 05:56 AM    Urobilinogen 0.1 05/31/2022 05:56 AM    Nitrites Negative 05/31/2022 05:56 AM    Leukocyte Esterase Large (A) 05/31/2022 05:56 AM    Bacteria Negative 05/31/2022 05:56 AM    WBC >100 (H) 05/31/2022 05:56 AM    RBC  05/31/2022 05:56 AM         Assessment:   Acute respiratory failure extubated on BiPAP 30% O2  sacral decubitus ulcer postdebridement  Sepsis with leukocytosis elevated procalcitonin and CRP  Left foot wound  Recent removal of left great toe and second toe  CVA with PEG tube placement  History of aspiration pneumonia  History of chronic kidney disease  CAD with ejection fraction about 20 to 25%  Hypertension  Hyperlipidemia  Anemia of chronic disease  Hyperkalemia  Static post transfusion  Uncontrolled diabetes  Hepatic shock/improving  Coagulopathy  Elevated  Troponin  Bacteremia with coagulase-negative Staphylococcus  Sacral wound secondary to Acinetobacter  and Enterococcus  Procedure:  1. Left transmetatarsal amputation. 2.  Sacral wound excisional wound debridement 13 x 15 cm to the bone.     Hypotension off  Levophed  Hypokalemia/replace potassium  Severe protein calorie malnutrition  Hypernatremia  Plan:     Water flush 200 cc every 4 hours  Unasyn 3 g IV every 8 hours  Aspirin 81 mg daily  FiberCon 625 mg daily  Questran 4 g twice a day  Lovenox 40 subcu daily  Pepcid 20 twice daily  Ferrous sulfate 325 mg twice a day  Flucanazole 200 mg every 24 hours  Lasix 40 mg every 12 hours  Gabapentin 300 mg twice a day  Robinul 0.1 mg every 6 hours  Hydralazine 12.5 mg 3 times a day  Lantus 10 units subcu at bedtime  Lantus 50 units subcu daily  Lopressor 12.5 twice daily  Entresto 1 tab twice a day  Repeat  the labs overall prognosis very poor    Detailed discussion with the patient's daughter about poor prognosis   At bedside  laparoscopic loop colostomy for fecal diversion canceled    Monitor H&H    Continue current medications    Discussed with ICU nurse      Had family meeting with patient's daughter and nephew and ICU  nursing director    Discussed about comfort care and hospice/waiting for decision    Waiting for family decision regarding hospice and discharge planning    Transfer to medical telemetry floor     Current Facility-Administered Medications:     vancomycin (VANCOCIN) 500 mg in 0.9% sodium chloride (MBP/ADV) 100 mL MBP, 500 mg, IntraVENous, Q24H, Israel Soto MD, Last Rate: 100 mL/hr at 06/09/22 1509, 500 mg at 06/09/22 1509    [START ON 6/11/2022] VANCOMYCIN RANDOM LAB REMINDER, , Other, ONCE, Memo Soto, MD    calcium polycarbophiL (FIBERCON) tablet 625 mg, 1 Tablet, Oral, DAILY, Israel Soto MD, 625 mg at 06/10/22 0809    ferrous sulfate tablet 325 mg, 1 Tablet, Oral, BID, Israel Soto MD, 325 mg at 06/10/22 0810    furosemide (LASIX) injection 40 mg, 40 mg, IntraVENous, Q12H, Angus Watts MD, 40 mg at 06/10/22 0809    glycopyrrolate (ROBINUL) injection 0.1 mg, 0.1 mg, IntraVENous, Q6H, Angus Watts MD, 0.1 mg at 06/10/22 0809    metoprolol tartrate (LOPRESSOR) tablet 12.5 mg, 12.5 mg, Per NG tube, BID, Israel Soto MD, 12.5 mg at 06/10/22 0810    alteplase (CATHFLO) injection 2 mg, 2 mg, InterCATHeter, PRN, Valencia Mercedes MD, 2 mg at 06/03/22 1708    fluconazole (DIFLUCAN) 200mg/100 mL IVPB (premix), 200 mg, IntraVENous, Q24H, James Shah MD, Last Rate: 100 mL/hr at 06/10/22 0809, 200 mg at 06/10/22 0809    aspirin chewable tablet 81 mg, 81 mg, Per G Tube, DAILY, Israel Soto MD, 81 mg at 06/10/22 0809    sodium chloride (NS) flush 5-40 mL, 5-40 mL, IntraVENous, Q8H, Mynor Matthew MD, 10 mL at 06/10/22 5908    sodium chloride (NS) flush 5-40 mL, 5-40 mL, IntraVENous, PRN, Latia Ambriz MD, 10 mL at 05/31/22 2213    [DISCONTINUED] ondansetron (ZOFRAN ODT) tablet 4 mg, 4 mg, Oral, Q8H PRN **OR** ondansetron (ZOFRAN) injection 4 mg, 4 mg, IntraVENous, Q6H PRN, Andrade Bob MD    enoxaparin (LOVENOX) injection 40 mg, 40 mg, SubCUTAneous, DAILY, Paulino, Andrade Guerra MD, 40 mg at 06/10/22 0809    insulin lispro (HUMALOG) injection, , SubCUTAneous, Q6H, Andrade Bob MD, 1 Units at 06/10/22 8648    glucose chewable tablet 16 g, 4 Tablet, Oral, PRN, Andrade Bob MD    glucagon [facility]) injection 1 mg, 1 mg, IntraMUSCular, PRN, Andrade Bob MD    hydrALAZINE (APRESOLINE) tablet 12.5 mg, 12.5 mg, Oral, TID, Monalisa Espinosa MD, 12.5 mg at 06/10/22 0809    sacubitriL-valsartan (ENTRESTO) 24-26 mg tablet 1 Tablet, 1 Tablet, Oral, Q12H, Andrade Bob MD, 1 Tablet at 06/10/22 0809    ampicillin-sulbactam (UNASYN) 3 g in 0.9% sodium chloride (MBP/ADV) 100 mL MBP, 3 g, IntraVENous, Q8H, Merary Bob MD, Last Rate: 200 mL/hr at 06/10/22 0613, 3 g at 06/10/22 0613    dextrose 10% infusion 0-250 mL, 0-250 mL, IntraVENous, PRN, Merary Bob MD, 125 mL at 05/19/22 0537    insulin glargine (LANTUS) injection 10 Units, 10 Units, SubCUTAneous, QHS, Merary Bob MD, 10 Units at 06/09/22 2218    0.9% sodium chloride infusion 250 mL, 250 mL, IntraVENous, PRN, Merary Bob MD, Last Rate: 15 mL/hr at 05/22/22 0820, Rate Verify at 05/22/22 0820    Vancomycin Pharmacy Dosing, , Other, Rx Dosing/Monitoring, Merary Bob MD    acetaminophen (TYLENOL) tablet 650 mg, 650 mg, Oral, Q6H PRN, 650 mg at 05/22/22 0556 **OR** acetaminophen (TYLENOL) suppository 650 mg, 650 mg, Rectal, Q6H PRN, Merary Bob MD, 650 mg at 05/16/22 2223    polyethylene glycol (MIRALAX) packet 17 g, 17 g, Oral, DAILY PRN, Merary Bob MD    ondansetron (ZOFRAN ODT) tablet 4 mg, 4 mg, Oral, Q8H PRN **OR** [DISCONTINUED] ondansetron (ZOFRAN) injection 4 mg, 4 mg, IntraVENous, Q6H PRN, Merary Bob MD    famotidine (PEPCID) tablet 20 mg, 20 mg, Oral, BID, Merary Bob MD, 20 mg at 06/10/22 2972    acidophilus-pectin, citrus tablet 2 Tablet, 2 Tablet, Oral, DAILY, Merary Bob MD, 2 Tablet at 06/10/22 0809    albuterol-ipratropium (DUO-NEB) 2.5 MG-0.5 MG/3 ML, 3 mL, Nebulization, Q6H PRN, Merary Bob MD    artificial saliva (MOUTH KOTE) 1 Spray, 1 Spray, Oral, TID, Merary Bob MD, 1 Ellinger at 06/10/22 0828    brimonidine (ALPHAGAN) 0.2 % ophthalmic solution 1 Drop, 1 Drop, Both Eyes, TID, Merary Bob MD, 1 Drop at 06/10/22 0828    cholestyramine-aspartame (QUESTRAN LIGHT) packet 4 g, 4 g, Oral, BID WITH MEALS, Merary Bob MD, 4 g at 06/10/22 0809    [Held by provider] fenofibrate nanocrystallized (TRICOR) tablet 48 mg, 48 mg, Oral, DAILY, Merary Bob MD, 48 mg at 05/19/22 0929    gabapentin (NEURONTIN) capsule 300 mg, 300 mg, Oral, BID, Paulino, Kadeem Ureña MD, 300 mg at 06/10/22 0810    insulin glargine (LANTUS) injection 50 Units, 50 Units, SubCUTAneous, DAILY, Paulino, Kadeem Ureña MD, 50 Units at 06/08/22 1026    latanoprost (XALATAN) 0.005 % ophthalmic solution 1 Drop, 1 Drop, Right Eye, QPM, Kadeem Bob MD, 1 Drop at 06/09/22 1752    traMADoL (ULTRAM) tablet 25 mg, 25 mg, Oral, Q12H PRN, Kadeem Bob MD, 25 mg at 06/08/22 1234

## 2022-06-11 NOTE — PROGRESS NOTES
Progress Note    Patient: Stella Rose MRN: [id number]  SSN: [id number]    YOB: 1945  Age: 78 y.o. Sex: female      Admit Date: 5/14/2022    LOS: 28 days     Subjective:   Patient followed for sacral wound infection with repeat culture growing Acinetobacter and Enterococci. Left foot wound also grew Acinetobacter, now status post left TMA. Apparently decision not yet finalized on hospice as patient remains on antibiotics. Family members at bedside state that they still have some questions about hospice. WBC remains elevated. CXR today  Unchanged. Objective:     Vitals:    06/11/22 0700 06/11/22 0800 06/11/22 0811 06/11/22 1100   BP: (!) 156/59   (!) 147/61   Pulse: 70 63  61   Resp: 22   17   Temp: 98.1 °F (36.7 °C)   98.5 °F (36.9 °C)   SpO2: 95%  95% 100%   Weight:       Height:            Intake and Output:  Current Shift: 06/11 0701 - 06/11 1900  In: 640   Out: -   Last three shifts: 06/09 1901 - 06/11 0700  In: 6315 [I.V.:200]  Out: 3551 [Urine:3551]    Physical Exam:    Vitals and nursing note reviewed. Constitutional:       General: She is not in acute distress. Appearance: She is ill-appearing. HENT: BIPAP    Eyes: open        Cardiovascular:      Rate and Rhythm: Normal rate and regular rhythm. Heart sounds: No murmur heard. Pulmonary: clear bilaterally    Abdominal:      General: Bowel sounds are normal.      Palpations: Abdomen is soft. Tenderness: There is no abdominal tenderness. Comments: PEG site unremarkable today      Genitourinary:     Comments: Indwelling Bell             Musculoskeletal:      Cervical back: Neck supple. Right lower leg: No edema. Left lower leg: No edema. No     Comments: Left foot surgical wound now shows dehiscence without drainage today, distal foot is dusky  Skin:     Findings: No rash.              Comments: Sacral wound unchanged  Neurological: somnolent  Psychiatric:  somnolent     Lab/Data Review:     WBC 13,800    ALT/AST 55 /14  Urinalysis with WBC >100, Yeasts, Bacteria negative    Procal 0.84 <0.64 <0.31 <0.26 <0.24 <0.31 <0.36  <0.59 <0.57  CRP 14.20 < 13.60 < 14.60 <11.40 <12.60  <18.90 <17.90    Blood cultures (5/14) Coagulase negative Staphylococci  Sacral wound (5/14) Acinetobacter baumannii sensitive to Unasyn, Cefepime, Ceftazidime and Enterococcus faecium resistant to Ampicillin  Sacral wound (5/23) Mixed skin yasmine FINAL  Left great toe (5/15) Acinetobacter baumannii  Sputum culture (5/19) Rare normal respiratory yasmine FINAL     CXR (6/10)  Patchy reticular markings same distribution through lungs. Cardiac and mediastinal structures unchanged noting thoracic aorta atherosclerosis. No pneumothorax or pleural effusion. Assessment:     Active Problems:    Sacral ulcer ([address] (5/14/2022)    1. Sacral wound infection secondary now to Acinetobacter baumannii and Enterococcus faecium, on Vancomycin and Unasyn, status post excisional wound debridement to the bone, Day #22 Vancomycin and Day #19 Unasyn. 2. Sepsis with persistent leukocytosis, elevated procal and CRP  3. Left foot wound, culture growing Acinetobacter baumannii, status post TMA  4. Possible ischemic hepatitiis with transaminitis following hypotensive episode, resolving  5. Positive blood cultures for Coagulase negative Staphylococci, probable contaminant  6. Hepatitis C antibody positive, resolved (negative HCV viral level)  7. New onset cardiomyopathy  8. Candida UTI with marked pyuria and yeasts, Day #11 IV Fluconazole  9. ? Hospice planned    Comment:  WBC, CRP and procal remain elevated. Plan:   1. Continue IV Unasyn and Vancomycin (for E. Faecium) unless placed on hospice  2. Continue Fluconazole 200 mg IV daily for 3 more days or until placed on hospice  3.  In am, repeat CBC, procal and CRP     Signed By: Mazin Tariq MD     June 11, 2022

## 2022-06-11 NOTE — PROGRESS NOTES
Progress Note    Patient: Kati Burr MRN: [id number]  SSN: [id number]    YOB: 1945  Age: 78 y.o. Sex: female      Admit Date: 5/14/2022    LOS: 28 days     Subjective:   78years old admitted for infected decubitus ulcers patient is bedbound from multiple CVA with peripheral arterial disease diabetes mellitus type 2 with multiple debridement and amputation. Patient was treated for sepsis on infection being considered for possible hospice versus surgery. Family member by the bedside patient has been extubated still in ICU    Objective:     Vitals:    06/11/22 0800 06/11/22 0811 06/11/22 1100 06/11/22 1200   BP:   (!) 147/61    Pulse: 63  61 64   Resp:   17    Temp:   98.5 °F (36.9 °C)    SpO2:  95% 100%    Weight:       Height:            Intake and Output:  Current Shift: 06/11 0701 - 06/11 1900  In: 640   Out: -   Last three shifts: 06/09 1901 - 06/11 0700  In: 6315 [I.V.:200]  Out: 3551 [Urine:3551]    Physical Exam:   General:   Nasal oxygen not verbal, appears stated age. Eyes:  Conjunctivae/corneas clear. PERRL, EOMs intact. Fundi benign   Ears:  Normal TMs and external ear canals both ears. Nose: Nares normal. Septum midline. Mucosa normal. No drainage or sinus tenderness. Mouth/Throat: Lips, mucosa, and tongue normal. Teeth and gums normal.   Neck: Supple, symmetrical, trachea midline, no adenopathy, thyroid: no enlargment/tenderness/nodules, no carotid bruit and no JVD. Back:   Symmetric, no curvature. ROM normal. No CVA tenderness. Lungs:   Clear to auscultation bilaterally. Heart:  Regular rate and rhythm, S1, S2 normal, no murmur, click, rub or gallop. Abdomen:   Soft, non-tender. Bowel sounds normal. No masses,  No organomegaly. Extremities:  Diffuse weakness c, no cyanosis or edema. Pulses: 2+ and symmetric all extremities.    Skin: Skin color, texture, turgor normal. No rashes or lesions   Lymph nodes: Cervical, supraclavicular, and axillary nodes normal. Neurologic:  Diffuse weakness        Lab/Data Review: All lab results for the last 24 hours reviewed. Recent Results (from the past 24 hour(s))   GLUCOSE, POC    Collection Time: 06/10/22  5:06 PM   Result Value Ref Range    Glucose (POC) 255 (H) 65 - 117 mg/dL    Performed by Friday Lisa    GLUCOSE, POC    Collection Time: 06/10/22  5:33 PM   Result Value Ref Range    Glucose (POC) 265 (H) 65 - 117 mg/dL    Performed by Lupe Schuster    GLUCOSE, POC    Collection Time: 06/10/22 11:50 PM   Result Value Ref Range    Glucose (POC) 293 (H) 65 - 117 mg/dL    Performed by Izabel Aguilar    CBC W/O DIFF    Collection Time: 06/11/22  4:30 AM   Result Value Ref Range    WBC 13.8 (H) 3.6 - 11.0 K/uL    RBC 3.02 (L) 3.80 - 5.20 M/uL    HGB 8.5 (L) 11.5 - 16.0 g/dL    HCT 28.1 (L) 35.0 - 47.0 %    MCV 93.0 80.0 - 99.0 FL    MCH 28.1 26.0 - 34.0 PG    MCHC 30.2 30.0 - 36.5 g/dL    RDW 17.8 (H) 11.5 - 14.5 %    PLATELET 358 813 - 525 K/uL    MPV 11.8 8.9 - 12.9 FL    NRBC 0.9 (H) 0.0  WBC    ABSOLUTE NRBC 0.13 (H) 0.00 - 4.41 K/uL   METABOLIC PANEL, COMPREHENSIVE    Collection Time: 06/11/22  4:30 AM   Result Value Ref Range    Sodium 148 (H) 136 - 145 mmol/L    Potassium 3.8 3.5 - 5.1 mmol/L    Chloride 117 (H) 97 - 108 mmol/L    CO2 24 21 - 32 mmol/L    Anion gap 7 5 - 15 mmol/L    Glucose 306 (H) 65 - 100 mg/dL    BUN 44 (H) 6 - 20 mg/dL    Creatinine 0.91 0.55 - 1.02 mg/dL    BUN/Creatinine ratio 48 (H) 12 - 20      GFR est AA >60 >60 ml/min/1.73m2    GFR est non-AA >60 >60 ml/min/1.73m2    Calcium 9.1 8.5 - 10.1 mg/dL    Bilirubin, total 0.5 0.2 - 1.0 mg/dL    AST (SGOT) 18 15 - 37 U/L    ALT (SGPT) 55 12 - 78 U/L    Alk.  phosphatase 137 (H) 45 - 117 U/L    Protein, total 6.9 6.4 - 8.2 g/dL    Albumin 2.0 (L) 3.5 - 5.0 g/dL    Globulin 4.9 (H) 2.0 - 4.0 g/dL    A-G Ratio 0.4 (L) 1.1 - 2.2     GLUCOSE, POC    Collection Time: 06/11/22 11:19 AM   Result Value Ref Range    Glucose (POC) 191 (H) 65 - 117 mg/dL    Performed by True Section, TROUGH    Collection Time: 06/11/22  2:00 PM   Result Value Ref Range    Vancomycin,trough 15.9 (H) 5.0 - 10.0 ug/mL   CREATININE    Collection Time: 06/11/22  2:00 PM   Result Value Ref Range    Creatinine 0.72 0.55 - 1.02 mg/dL         Assessment:     Active Problems:    Sacral ulcer ([address] (5/14/2022)      Above  Plan:     Patient is waiting for transfer to [facility]    Signed By: Lenin Patel MD     June 11, 2022

## 2022-06-11 NOTE — PROGRESS NOTES
IMPRESSION:   1. Acute hypoxic respiratory failure extubated patient is now on 3 L nasal cannula she was on BiPAP machine   2. Pulmonary edema mild  3. Aspiration chronically  4. Sacral decubiti ulcer with Acinetobacter and Enterococcus  5. Severe sepsis with septic shock resolved on no pressors  6. Left foot wound  7. Candidal UTI  8. Transaminitis   9. Shock liver resolved  10. Hypokalemia repleted  11. Symptomatic anemia stable after transfusion  12. Cardiomyopathy ejection fraction 50 to 55%  13. Mild pulmonary edema      RECOMMENDATIONS/PLAN:   1. ICU monitoring  1. Extubated on 6/3 worsening tachypnea she was placed on noninvasive ventilator likely recurrent aspiration and fluid overload, she is on noninvasive ventilator BiPAP machine switched to nasal cannula  2. Continue with Lasix  3. Drop in hemoglobin will continue to follow  4. New onset cardiomyopathy with the Lasix chest x-ray shows improvement in the pleural effusion  5. Persistent infection on Unasyn and vancomycin  6. Chest x-ray shows worsening of the congestion  7. Patient underwent on 5/23 left metatarsal amputation and sacral wound debridement  8. Severe sepsis with decubiti ulcer   9. Patient is on Unasyn  10. Chest x-ray shows mild congestive changes with fluid in the minor fissure     [x] High complexity decision making was performed  [x] See my orders for details  HPI   72-year-old lady came in because of leg pain past medical history of hypertension diabetes mellitus peripheral vascular disease CVA she is bedbound she has leg wound and also has sacral decubiti ulcer and she was scheduled for laparoscopic colostomy and sacral wound debridement and amputation of the left tarsometatarsal because of wound infection but her condition got worse her liver enzyme was elevated INR was also elevated she was intubated transferred to ICU was getting vancomycin and Unasyn started on Levophed because of low blood pressure.     PMH:  has a past medical history of Anemia, Chronic kidney disease, Diabetes mellitus ([facility] [address] Hemiplegia affecting dominant side, post-stroke ([facility] [address] (05/04/2022), HTN (hypertension), Hyperkalemia, Hyperlipidemia, Ill-defined condition, Neuropathy, PAD (peripheral artery disease) ([facility] [address] Sciatica, Stroke ([facility] [address] and UTI (urinary tract infection). PSH:   has a past surgical history that includes hx other surgical (Bilateral); hx amputation (Right); hx other surgical; hx cervical fusion; hx vascular stent (Bilateral); hx vascular stent (Bilateral); hx gi; and ir insert non tunl cvc over 5 yrs (5/27/2022). FHX: family history includes Cancer in her mother; Diabetes in her mother; Hypertension in her mother. SHX:  reports that she has never smoked. She has never used smokeless tobacco. She reports previous alcohol use. She reports that she does not use drugs.     ALL: No Known Allergies     MEDS:   [x] Reviewed - As Below   [] Not reviewed    Current Facility-Administered Medications   Medication    vancomycin (VANCOCIN) 500 mg in 0.9% sodium chloride (MBP/ADV) 100 mL MBP    VANCOMYCIN RANDOM LAB REMINDER    calcium polycarbophiL (FIBERCON) tablet 625 mg    ferrous sulfate tablet 325 mg    furosemide (LASIX) injection 40 mg    glycopyrrolate (ROBINUL) injection 0.1 mg    metoprolol tartrate (LOPRESSOR) tablet 12.5 mg    alteplase (CATHFLO) injection 2 mg    fluconazole (DIFLUCAN) 200mg/100 mL IVPB (premix)    aspirin chewable tablet 81 mg    sodium chloride (NS) flush 5-40 mL    sodium chloride (NS) flush 5-40 mL    ondansetron (ZOFRAN) injection 4 mg    enoxaparin (LOVENOX) injection 40 mg    insulin lispro (HUMALOG) injection    glucose chewable tablet 16 g    glucagon (GLUCAGEN) injection 1 mg    hydrALAZINE (APRESOLINE) tablet 12.5 mg    sacubitriL-valsartan (ENTRESTO) 24-26 mg tablet 1 Tablet    ampicillin-sulbactam (UNASYN) 3 g in 0.9% sodium chloride (MBP/ADV) 100 mL MBP    dextrose 10% infusion 0-250 mL    insulin glargine (LANTUS) injection 10 Units    0.9% sodium chloride infusion 250 mL    Vancomycin Pharmacy Dosing    acetaminophen (TYLENOL) tablet 650 mg    Or    acetaminophen (TYLENOL) suppository 650 mg    polyethylene glycol (MIRALAX) packet 17 g    ondansetron (ZOFRAN ODT) tablet 4 mg    famotidine (PEPCID) tablet 20 mg    acidophilus-pectin, citrus tablet 2 Tablet    albuterol-ipratropium (DUO-NEB) 2.5 MG-0.5 MG/3 ML    artificial saliva (MOUTH KOTE) 1 Spray    brimonidine (ALPHAGAN) 0.2 % ophthalmic solution 1 Drop    cholestyramine-aspartame (QUESTRAN LIGHT) packet 4 g    [Held by provider] fenofibrate nanocrystallized (TRICOR) tablet 48 mg    gabapentin (NEURONTIN) capsule 300 mg    insulin glargine (LANTUS) injection 50 Units    latanoprost (XALATAN) 0.005 % ophthalmic solution 1 Drop    traMADoL (ULTRAM) tablet 25 mg      MAR reviewed and pertinent medications noted or modified as needed   Current Facility-Administered Medications   Medication    vancomycin (VANCOCIN) 500 mg in 0.9% sodium chloride (MBP/ADV) 100 mL MBP    VANCOMYCIN RANDOM LAB REMINDER    calcium polycarbophiL (FIBERCON) tablet 625 mg    ferrous sulfate tablet 325 mg    furosemide (LASIX) injection 40 mg    glycopyrrolate (ROBINUL) injection 0.1 mg    metoprolol tartrate (LOPRESSOR) tablet 12.5 mg    alteplase (CATHFLO) injection 2 mg    fluconazole (DIFLUCAN) 200mg/100 mL IVPB (premix)    aspirin chewable tablet 81 mg    sodium chloride (NS) flush 5-40 mL    sodium chloride (NS) flush 5-40 mL    ondansetron (ZOFRAN) injection 4 mg    enoxaparin (LOVENOX) injection 40 mg    insulin lispro (HUMALOG) injection    glucose chewable tablet 16 g    glucagon (GLUCAGEN) injection 1 mg    hydrALAZINE (APRESOLINE) tablet 12.5 mg    sacubitriL-valsartan (ENTRESTO) 24-26 mg tablet 1 Tablet    ampicillin-sulbactam (UNASYN) 3 g in 0.9% sodium chloride (MBP/ADV) 100 mL MBP    dextrose 10% infusion 0-250 mL    insulin glargine (LANTUS) injection 10 Units    0.9% sodium chloride infusion 250 mL    Vancomycin Pharmacy Dosing    acetaminophen (TYLENOL) tablet 650 mg    Or    acetaminophen (TYLENOL) suppository 650 mg    polyethylene glycol (MIRALAX) packet 17 g    ondansetron (ZOFRAN ODT) tablet 4 mg    famotidine (PEPCID) tablet 20 mg    acidophilus-pectin, citrus tablet 2 Tablet    albuterol-ipratropium (DUO-NEB) 2.5 MG-0.5 MG/3 ML    artificial saliva (MOUTH KOTE) 1 Spray    brimonidine (ALPHAGAN) 0.2 % ophthalmic solution 1 Drop    cholestyramine-aspartame (QUESTRAN LIGHT) packet 4 g    [Held by provider] fenofibrate nanocrystallized (TRICOR) tablet 48 mg    gabapentin (NEURONTIN) capsule 300 mg    insulin glargine (LANTUS) injection 50 Units    latanoprost (XALATAN) 0.005 % ophthalmic solution 1 Drop    traMADoL (ULTRAM) tablet 25 mg      PMH:  has a past medical history of Anemia, Chronic kidney disease, Diabetes mellitus ([address] Hemiplegia affecting dominant side, post-stroke ([address] (05/04/2022), HTN (hypertension), Hyperkalemia, Hyperlipidemia, Ill-defined condition, Neuropathy, PAD (peripheral artery disease) ([address] Sciatica, Stroke ([address] and UTI (urinary tract infection). PSH:   has a past surgical history that includes hx other surgical (Bilateral); hx amputation (Right); hx other surgical; hx cervical fusion; hx vascular stent (Bilateral); hx vascular stent (Bilateral); hx gi; and ir insert non tunl cvc over 5 yrs (5/27/2022). FHX: family history includes Cancer in her mother; Diabetes in her mother; Hypertension in her mother. SHX:  reports that she has never smoked. She has never used smokeless tobacco. She reports previous alcohol use. She reports that she does not use drugs.      ROS:  Unable to obtain intubated on ventilator    Hemodynamics:    CO:    CI:    CVP:    SVR:   PAP Systolic:    PAP Diastolic:    PVR:    FG88:        Ventilator Settings:      Mode Rate TV Press PEEP FiO2 PIP Min. Vent   Pressure support,Spontaneous    500 ml 15 cm H2O  5 cm H20 32 %  27 cm H2O  13.2 l/min        Vital Signs: Telemetry:    normal sinus rhythm Intake/Output:   Visit Vitals  BP (!) 156/59 (BP 1 Location: Right upper arm, BP Patient Position: At rest)   Pulse 70   Temp 98.1 °F (36.7 °C)   Resp 22   Ht 5' 6.93\" (1.7 m)   Wt 95.1 kg (209 lb 10.5 oz)   SpO2 95%   Breastfeeding No   BMI 32.91 kg/m²       Temp (24hrs), Av.3 °F (36.8 °C), Min:98 °F (36.7 °C), Max:99 °F (37.2 °C)        O2 Device: Nasal cannula O2 Flow Rate (L/min): 3 l/min       Wt Readings from Last 4 Encounters:   22 95.1 kg (209 lb 10.5 oz)   04/10/22 86.6 kg (191 lb)   22 82.2 kg (181 lb 3.5 oz)   20 84.4 kg (186 lb)          Intake/Output Summary (Last 24 hours) at 2022 0807  Last data filed at 2022 0547  Gross per 24 hour   Intake 3350 ml   Output 2750 ml   Net 600 ml       Last shift:      No intake/output data recorded. Last 3 shifts:  1901 -  0700  In: 5475 [I.V.:200]  Out: 3551 [Urine:3551]       Physical Exam:     General: On noninvasive ventilator weakly tries to open her eyes follows no commands  HEENT: NCAT,  Eyes: anicteric; conjunctiva clear, random eye movement when eyelids are open  Neck: no nodes, , trach midline; no accessory MM use. Neck veins obscured by obesity but seems slightly engorged  Chest: no deformity,   Cardiac: R regular; no murmur;   Lungs: Shallow breaths with diffuse wheezes and rhonchi  Abd: soft, NT, hypoactive BS  Ext: Diffuse edema; no joint swelling;  No clubbing  :clear urine  Neuro: Seems to weakly try to open her eyes follows no commands does not speak does not move her extremities  Psych-  unable to assess  Skin: warm, dry, no cyanosis;   Pulses: Brachial and radial pulses intact  Capillary: Normal capillary refill      DATA:    MAR reviewed and pertinent medications noted or modified as needed  MEDS:   Current Facility-Administered Medications   Medication  vancomycin (VANCOCIN) 500 mg in 0.9% sodium chloride (MBP/ADV) 100 mL MBP    VANCOMYCIN RANDOM LAB REMINDER    calcium polycarbophiL (FIBERCON) tablet 625 mg    ferrous sulfate tablet 325 mg    furosemide (LASIX) injection 40 mg    glycopyrrolate (ROBINUL) injection 0.1 mg    metoprolol tartrate (LOPRESSOR) tablet 12.5 mg    alteplase (CATHFLO) injection 2 mg    fluconazole (DIFLUCAN) 200mg/100 mL IVPB (premix)    aspirin chewable tablet 81 mg    sodium chloride (NS) flush 5-40 mL    sodium chloride (NS) flush 5-40 mL    ondansetron (ZOFRAN) injection 4 mg    enoxaparin (LOVENOX) injection 40 mg    insulin lispro (HUMALOG) injection    glucose chewable tablet 16 g    glucagon (GLUCAGEN) injection 1 mg    hydrALAZINE (APRESOLINE) tablet 12.5 mg    sacubitriL-valsartan (ENTRESTO) 24-26 mg tablet 1 Tablet    ampicillin-sulbactam (UNASYN) 3 g in 0.9% sodium chloride (MBP/ADV) 100 mL MBP    dextrose 10% infusion 0-250 mL    insulin glargine (LANTUS) injection 10 Units    0.9% sodium chloride infusion 250 mL    Vancomycin Pharmacy Dosing    acetaminophen (TYLENOL) tablet 650 mg    Or    acetaminophen (TYLENOL) suppository 650 mg    polyethylene glycol (MIRALAX) packet 17 g    ondansetron (ZOFRAN ODT) tablet 4 mg    famotidine (PEPCID) tablet 20 mg    acidophilus-pectin, citrus tablet 2 Tablet    albuterol-ipratropium (DUO-NEB) 2.5 MG-0.5 MG/3 ML    artificial saliva (MOUTH KOTE) 1 Spray    brimonidine (ALPHAGAN) 0.2 % ophthalmic solution 1 Drop    cholestyramine-aspartame (QUESTRAN LIGHT) packet 4 g    [Held by provider] fenofibrate nanocrystallized (TRICOR) tablet 48 mg    gabapentin (NEURONTIN) capsule 300 mg    insulin glargine (LANTUS) injection 50 Units    latanoprost (XALATAN) 0.005 % ophthalmic solution 1 Drop    traMADoL (ULTRAM) tablet 25 mg        Labs:    Recent Labs     06/11/22  0430 06/10/22  0445   WBC 13.8* 13.5*   HGB 8.5* 7.9*    278     Recent Labs 22  0430 06/10/22  0445   * 150*   K 3.8 3.5   * 120*   CO2 24 24   * 187*   BUN 44* 44*   CREA 0.91 0.92   CA 9.1 9.3   MG  --  2.2   PHOS  --  3.1   ALB 2.0* 1.9*   ALT 55 67     Recent Labs     06/10/22  0440 22  0245   PH 7.41 7.40   PCO2 37 35   PO2 93 54*   HCO3 23 22   FIO2  --  30.0   6/5 and IV IP 16 EP 5 rate 16 FiO2 30%    AC 12  PEEP 5 FiO2 30%   echo ejection fraction 20% mild mitral regurg left atrium 3.5 cm RVSP 35  , 1313, 1361    Lab Results   Component Value Date/Time    Culture result: Heavy  Mixed skin yasmine isolated   2022 06:15 PM    Culture result: Rare Normal respiratory yasmine 2022 02:48 PM    Culture result: Heavy Acinetobacter baumannii complex 05/15/2022 07:15 PM    Culture result: Heavy Diphtheroids 05/15/2022 07:15 PM     Lab Results   Component Value Date/Time    TSH 2.880 2016 10:13 AM        Imagin/22 chest x-ray with ET tube in place hazy accentuated basilar markings with small amount of fluid in the minor fissure  Results from Hospital Encounter encounter on 22    XR CHEST PORT    Narrative  Chest single view. Comparison single view chest 2022. Removal right CVC. Patchy reticular markings same distribution through lungs. Cardiac and  mediastinal structures unchanged noting thoracic aorta atherosclerosis. No  pneumothorax or pleural effusion. Cervical hardware. Results from [place] encounter on 22    CT ABD PELV WO CONT    Narrative  CT ABD PELV WO CONT    Technique: Noncontrasted CT scan of the abdomen and pelvis was performed  utilizing transaxial images obtained from the dome of the diaphragm to the pubic  symphysis. Coronal and sagittal reconstructions were generated.     Dose Reduction:  All CT scans at this facility are performed using dose reduction optimization  techniques as appropriate to a performed exam including the following: Automated  exposure control, adjustments of the mA and/or kV according to patient size, or  use of iterative reconstruction technique. Comparison:  2/25/2022. Findings:  Small-moderate pleural effusions are present with bibasilar  atelectasis. Atherosclerotic calcifications are again evident including coronary  artery calcifications. Gastrostomy tube tip in the gastric antrum. The liver, spleen, pancreas, and  adrenal glands are unremarkable for noncontrasted technique. Stable lobulated  kidneys. Negative for hydronephrosis. Gallbladder is unremarkable. No biliary  duct dilatation apparent. The abdominal aorta is normal in caliber. There is no evidence of bowel obstruction. Small volume ascites has developed. The urinary bladder is decompressed by a Bell catheter. The uterus is  unremarkable. The appendix is normal.    There is mild body wall edema. No suspicious lytic or blastic lesion evident. Impression  Third spacing of fluids with small volume ascites, small-moderate  pleural effusions, and mild body wall edema. Bibasilar atelectasis. 5/20 intubated on ventilator we will continue current vent settings patient is supposed to go for surgery because of transaminitis elevated liver enzyme we will wait as per surgeon for sacral wound debridement and diverting loop colostomy off Levophed, improvement in AST and ALT  5/21 continue with the current vent settings ABG acceptable liver enzyme improving awaiting for the surgery  5/22 maintain ventilator as is in anticipation of surgery.   Renal function improved mild congestion on chest x-ray we will give 1 dose Lasix and potassium  5/23 patient is going for surgery today we will leave ventilator as it is INR is 1.5  5/25 patient remained on ventilator intubated doing well, patient received vitamin K fresh frozen plasma schedule for laparoscopic loop colostomy possible today  5/26 no surgery has been plans will start weaning do sedation vacation  5/27 try to do sedation vacation to wean patient blood pressure dropped still on Levophed we will continue to wean discussed with the family at bedside  5/28 remains on ventilator sedated and also on Levophed possible surgery next week  5/29 on ventilator ET tube advisement 1 to 1.5 cm we will repeat chest x-ray ABG acceptable  5/30 continue with current vent setting  5/31 awaiting for possible surgery if not we will start weaning  6/1 remain intubated on ventilator surgery has been delayed we will start weaning and plan for extubation  6/2 no surgery was planned so we will try to wean she is off Levophed we will do sedation vacation and if weaning cardio acceptable will extubate  6/3 change vent settings to spontaneous if weaning creatinine acceptable will extubate  6/4 extubated on 6/3 now on nasal cannula tolerating well  6/5 respiratory distress last night placed on noninvasive ventilator chest x-ray continues to show fluid overload. Will increase Lasix to 40 mg twice daily give albumin today. Likely has continued aspiration. Will increase Robinul to every 6 hours dosing  6/6 patient is alert awake he is full code we will try to wean BiPAP put on nasal cannula and keep BiPAP at night  6/7 noninvasive ventilator Machine we will change it to high flow nasal cannula  6/8 on noninvasive ventilator BiPAP machine chest x-ray shows improvement overall condition prognosis poor  6/10 transition from BiPAP to nasal cannula.   3 L ABG acceptable  Time of care 30 minutes

## 2022-06-11 NOTE — PROGRESS NOTES
Vancomycin Dosing Consult  Day # 27 of vancomycin therapy  Consult ordered by Dr. Oli Gurrola for this 78 y.o. female for indication of  sacral wound infection, sepsis. Antibiotic regimen: Vancomycin + Unasyn  + fluconazole    Temp (24hrs), Av.4 °F (36.9 °C), Min:98.1 °F (36.7 °C), Max:99 °F (37.2 °C)    Recent Labs     22  0430 06/10/22  0445   WBC 13.8* 13.5*     Recent Labs     22  1400 22  0430 06/10/22  0445   CREA 0.72 0.91 0.92   BUN  --  44* 44*       Estimated Creatinine Clearance: 79.8 mL/min (based on SCr of 0.72 mg/dL). ml/min  Concomitant nephrotoxic drugs: Loop diuretics        Cultures:    Wound: Moderate MDR Pseudomonas aeruginosa susceptible to Zerbaxa, aminoglycosides), moderate mixed skin yasmine, moderate mixed enteric yasmine including yeast, final   Blood: CoNS in the anaerobic bottle, final   Wound, ulcer:  Moderate Acinetobacter baumannii, moderate Enterococcus faecium, light >2 organisms (contraindicated), final  5/15 Wound, great toe: Heavy Acinetobacter baumannii complex (Zosyn resistant, susceptible to Unasyn), heavy Diphtheroids, final   Tissue: Heavy mixed skin yasmine, final    MRSA Swab: Not ordered, patient already received first dose of vancomycin    Target range: Trough 10-15 mcg/mL    Recent level history (3):  Date/Time Dose & Interval Measured Level (mcg/mL)    @ 0443 500 mg X1 at 1100 13.6    @ 0351 500 mg X1 at 1300 14.6    @0950 500 mg X1 at 1234 15.5    1400 500mg IV q24h 15.9     Ht Readings from Last 1 Encounters:   22 170 cm (66.93\")     Wt Readings from Last 1 Encounters:   22 95.1 kg (209 lb 10.5 oz)     Ideal body weight: 61.4 kg (135 lb 7.1 oz)  Adjusted ideal body weight: 74.9 kg (165 lb 2.1 oz)        Assessment/Plan:   Patient is afebrile, leukocytosis  Vancomycin level is therapeutic  Continue the current regimen  Antimicrobial stop date TBD

## 2022-06-12 NOTE — PROGRESS NOTES
Progress Note    Patient: Waqas Henson MRN: [id number]  SSN: [id number]    YOB: 1945  Age: 78 y.o. Sex: female      Admit Date: 5/14/2022    LOS: 29 days     Subjective:   Patient followed for sacral wound infection with repeat culture growing Acinetobacter and Enterococci. Left foot wound also grew Acinetobacter, now status post left TMA. Apparently decision not yet finalized on hospice as patient remains on antibiotics. She is having low grade temperatures but her WBC, procal and CRP all decreasing today. Per notes, family members have been very aggressive and combative about her care. Not clear what has prompted this behavior. Patient nonverbal but appeared to be resting comfortably. Multiple family members present. They expressed concern about making patient comfort care. I explained that even if her infection improves there are many other factors which do not portend a favorable outcome. Objective:     Vitals:    06/12/22 0000 06/12/22 0017 06/12/22 0436 06/12/22 0918   BP:  (!) 151/67 (!) 152/87 (!) 181/76   Pulse: 64 61 77 85   Resp:  16 18 17   Temp:  97.5 °F (36.4 °C) 98.2 °F (36.8 °C) 99.5 °F (37.5 °C)   SpO2:  99% 100% 94%   Weight:       Height:            Intake and Output:  Current Shift: 06/12 0701 - 06/12 1900  In: 1260 [I.V.:120]  Out: 1050 [Urine:800; Drains:250]  Last three shifts: 06/10 1901 - 06/12 0700  In: 4830 [I.V.:200]  Out: 3600 [Urine:3600]    Physical Exam:    Vitals and nursing note reviewed. Constitutional:       General: She is not in acute distress. Appearance: She is ill-appearing. HENT: BIPAP    Eyes: open        Cardiovascular:      Rate and Rhythm: Normal rate and regular rhythm. Heart sounds: No murmur heard. Pulmonary: clear bilaterally    Abdominal:      General: Bowel sounds are normal.      Palpations: Abdomen is soft. Tenderness: There is no abdominal tenderness.       Comments: PEG site unremarkable today Genitourinary:     Comments: Indwelling Bell             Musculoskeletal:      Cervical back: Neck supple. Right lower leg: No edema. Left lower leg: No edema. No     Comments: Left foot surgical wound now shows dehiscence without drainage today, distal foot is dusky  Skin:     Findings: No rash. Comments: Sacral wound unchanged  Neurological: somnolent  Psychiatric:  somnolent     Lab/Data Review:     WBC 13,100    ALT/AST 55 /14  Urinalysis with WBC >100, Yeasts, Bacteria negative    Procal 0.67 < 0.84 <0.64 <0.31 <0.26 <0.24 <0.31 <0.36  <0.59 <0.57  CRP 10.90 <14.20 < 13.60 < 14.60 <11.40 <12.60  <18.90 <17.90    Blood cultures (5/14) Coagulase negative Staphylococci  Sacral wound (5/14) Acinetobacter baumannii sensitive to Unasyn, Cefepime, Ceftazidime and Enterococcus faecium resistant to Ampicillin  Sacral wound (5/23) Mixed skin yasmine FINAL  Left great toe (5/15) Acinetobacter baumannii  Sputum culture (5/19) Rare normal respiratory yasmine FINAL     CXR (6/10)  Patchy reticular markings same distribution through lungs. Cardiac and mediastinal structures unchanged noting thoracic aorta atherosclerosis. No pneumothorax or pleural effusion. Assessment:     Active Problems:    Sacral ulcer ([address] (5/14/2022)    1. Sacral wound infection secondary now to Acinetobacter baumannii and Enterococcus faecium, on Vancomycin and Unasyn, status post excisional wound debridement to the bone, Day #23 Vancomycin and Day #20 Unasyn. 2. Sepsis with persistent leukocytosis, elevated procal and CRP  3. Left foot wound, culture growing Acinetobacter baumannii, status post TMA  4. Possible ischemic hepatitiis with transaminitis following hypotensive episode, resolving  5. Positive blood cultures for Coagulase negative Staphylococci, probable contaminant  6. Hepatitis C antibody positive, resolved (negative HCV viral level)  7. New onset cardiomyopathy  8.  Candida UTI with marked pyuria and yeasts, Day #12 IV Fluconazole  9. ? Hospice planned    Comment:  WBC, CRP and procal all decreasing today. d    Plan:   1. Continue IV Unasyn and Vancomycin (for E. Faecium) unless placed on hospice  2. Continue Fluconazole 200 mg IV daily for 2 more days or until placed on hospice  3.  In am, repeat CBC, procal and CRP     Signed By: Jarred Lozada MD     June 12, 2022

## 2022-06-12 NOTE — PROGRESS NOTES
Progress Note    Patient: Nivia Dalal MRN: [id number]  SSN: [id number]    YOB: 1945  Age: 78 y.o. Sex: female      Admit Date: 5/14/2022    LOS: 29 days     Subjective:     78years old patient with multiple several CVA, bedbound, PVD, diabetes with diabetic neuropathy and debridements and amputations. Treated for acute hypoxic respiratory failure possible recurrent pneumonia recently extubated and transferred to the medical floor for continued treatment for wound infection patient is on wound VAC family member by the bedside nursing reports reviewed    Objective:     Vitals:    06/12/22 0000 06/12/22 0017 06/12/22 0436 06/12/22 0918   BP:  (!) 151/67 (!) 152/87 (!) 181/76   Pulse: 64 61 77 85   Resp:  16 18 17   Temp:  97.5 °F (36.4 °C) 98.2 °F (36.8 °C) 99.5 °F (37.5 °C)   SpO2:  99% 100% 94%   Weight:       Height:            Intake and Output:  Current Shift: 06/12 0701 - 06/12 1900  In: 1260 [I.V.:120]  Out: 1050 [Urine:800; Drains:250]  Last three shifts: 06/10 1901 - 06/12 0700  In: 4830 [I.V.:200]  Out: 3600 [Urine:3600]    Physical Exam:   General:  Aler and patient, appears stated age. Eyes:  Conjunctivae/corneas clear. PERRL, EOMs intact. Fundi benign   Ears:  Normal TMs and external ear canals both ears. Nose: Nares normal. Septum midline. Mucosa normal. No drainage or sinus tenderness. Mouth/Throat: Lips, mucosa, and tongue normal. Teeth and gums normal.   Neck: Supple, symmetrical, trachea midline, no adenopathy, thyroid: no enlargment/tenderness/nodules, no carotid bruit and no JVD. Back:   Symmetric, no curvature. ROM normal. No CVA tenderness. Lungs:   Clear to auscultation bilaterally. Heart:  Regular rate and rhythm, S1, S2 normal, no murmur, click, rub or gallop. Abdomen:   Soft, non-tender. Bowel sounds normal. No masses,  No organomegaly. Extremities: Extremities weaknessc, . Pulses: 2+ and symmetric all extremities.    Skin: Skin color, texture, turgor normal. No rashes or lesions   Lymph nodes: weakness   Neurologic:        Lab/Data Review: All lab results for the last 24 hours reviewed. Recent Results (from the past 24 hour(s))   VANCOMYCIN, TROUGH    Collection Time: 06/11/22  2:00 PM   Result Value Ref Range    Vancomycin,trough 15.9 (H) 5.0 - 10.0 ug/mL   CREATININE    Collection Time: 06/11/22  2:00 PM   Result Value Ref Range    Creatinine 0.72 0.55 - 1.02 mg/dL   GLUCOSE, POC    Collection Time: 06/11/22  5:22 PM   Result Value Ref Range    Glucose (POC) 215 (H) 65 - 117 mg/dL    Performed by Luz Marina Blackburn    GLUCOSE, POC    Collection Time: 06/11/22 11:40 PM   Result Value Ref Range    Glucose (POC) 182 (H) 65 - 117 mg/dL    Performed by Ramya Osorio, POC    Collection Time: 06/12/22  6:32 AM   Result Value Ref Range    Glucose (POC) 150 (H) 65 - 117 mg/dL    Performed by Scott Holguin    CBC WITH AUTOMATED DIFF    Collection Time: 06/12/22  7:12 AM   Result Value Ref Range    WBC 13.1 (H) 3.6 - 11.0 K/uL    RBC 3.57 (L) 3.80 - 5.20 M/uL    HGB 10.1 (L) 11.5 - 16.0 g/dL    HCT 32.6 (L) 35.0 - 47.0 %    MCV 91.3 80.0 - 99.0 FL    MCH 28.3 26.0 - 34.0 PG    MCHC 31.0 30.0 - 36.5 g/dL    RDW 17.6 (H) 11.5 - 14.5 %    PLATELET 535 114 - 817 K/uL    MPV 11.5 8.9 - 12.9 FL    NRBC 0.4 (H) 0.0  WBC    ABSOLUTE NRBC 0.05 (H) 0.00 - 0.01 K/uL    NEUTROPHILS 76 (H) 32 - 75 %    LYMPHOCYTES 14 12 - 49 %    MONOCYTES 5 5 - 13 %    EOSINOPHILS 3 0 - 7 %    BASOPHILS 1 0 - 1 %    IMMATURE GRANULOCYTES 1 (H) 0 - 0.5 %    ABS. NEUTROPHILS 10.1 (H) 1.8 - 8.0 K/UL    ABS. LYMPHOCYTES 1.8 0.8 - 3.5 K/UL    ABS. MONOCYTES 0.6 0.0 - 1.0 K/UL    ABS. EOSINOPHILS 0.3 0.0 - 0.4 K/UL    ABS. BASOPHILS 0.1 0.0 - 0.1 K/UL    ABS. IMM.  GRANS. 0.1 (H) 0.00 - 0.04 K/UL    DF AUTOMATED     C REACTIVE PROTEIN, QT    Collection Time: 06/12/22  7:12 AM   Result Value Ref Range    C-Reactive protein 10.90 (H) 0.00 - 0.60 mg/dL   PROCALCITONIN    Collection Time: 06/12/22  7:12 AM   Result Value Ref Range    Procalcitonin 0.67 (H) 0 ng/mL   PROTHROMBIN TIME + INR    Collection Time: 06/12/22  7:12 AM   Result Value Ref Range    Prothrombin time 17.9 (H) 11.9 - 14.6 sec    INR 1.5 (H) 0.9 - 1.1     GLUCOSE, POC    Collection Time: 06/12/22 10:56 AM   Result Value Ref Range    Glucose (POC) 169 (H) 65 - 117 mg/dL    Performed by Dominique Puri            Assessment:     Active Problems:    Sacral ulcer ([address] (5/14/2022)      infected sacral decubitus    Plan:     Discussed with a family member at the bedside continue with IV antibiotics and wound care.   Surgery following for possible colostomy diversion    Signed By: Marcella Wong MD     June 12, 2022
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
## 2022-06-12 NOTE — ROUTINE PROCESS
TRANSFER - OUT REPORT:    Verbal report given to Deejay Cosby RN on Cedrickevin Stevens  being transferred to [facility]  [facility]adams    Report consisted of patients Situation,. Background, Assessment and   Recommendations(SBAR). Information from the following report(s) SBAR, OR Summary, Intake/Output, MAR and Cardiac Rhythm Sinus Rhythm with PAC was reviewed with the receiving nurse. Lines:   Peripheral IV 05/24/22 Anterior; Left Forearm (Active)   Site Assessment Clean, dry, & intact 06/11/22 2000   Phlebitis Assessment 0 06/11/22 2000   Infiltration Assessment 0 06/11/22 2000   Dressing Status Clean 06/11/22 2000   Dressing Type Transparent 06/11/22 2000   Hub Color/Line Status Capped;Flushed 06/11/22 2000   Action Taken Open ports on tubing capped 06/11/22 2000   Alcohol Cap Used Yes 06/11/22 2000       Peripheral IV 94/93/40 Left Cephalic (Active)   Site Assessment Clean, dry, & intact 06/11/22 2000   Phlebitis Assessment 0 06/11/22 2000   Infiltration Assessment 0 06/11/22 2000   Dressing Status Clean, dry, & intact 06/11/22 2000   Dressing Type Transparent 06/11/22 2000   Hub Color/Line Status Flushed; Infusing;Patent 06/11/22 2000   Action Taken Open ports on tubing capped 06/11/22 2000   Alcohol Cap Used Yes 06/11/22 2000       Peripheral IV 06/11/22 Posterior;Right Hand (Active)        Opportunity for questions and clarification was provided. Patient transported with:   O2 @ 2 liters  Patient's medications from home  Registered Nurse   Tele Box # [phone]  . Wound vac     Called  her Daughter Kristin Alas # [phone] ) @ 2200,

## 2022-06-12 NOTE — PROGRESS NOTES
IMPRESSION:   1. Acute hypoxic respiratory failure extubated patient is now on 3 L nasal cannula she was on BiPAP machine   2. Pulmonary edema resolved  3. Aspiration chronically  4. Sacral decubiti ulcer with Acinetobacter and Enterococcus  5. Severe sepsis   6. Left foot wound  7. Candidal UTI  8. Shock liver resolved  9. Hypokalemia repleted  10. Symptomatic anemia stable after transfusion  11. Cardiomyopathy ejection fraction 50 to 55%      RECOMMENDATIONS/PLAN:   1. ICU monitoring  1. Extubated on 6/3 worsening tachypnea she was placed on noninvasive ventilator likely recurrent aspiration and fluid overload, she is on noninvasive ventilator BiPAP machine switched to nasal cannula  2. Continue with Lasix  3. Drop in hemoglobin will continue to follow  4. New onset cardiomyopathy with the Lasix chest x-ray shows improvement in the pleural effusion  5. Persistent infection on Unasyn and vancomycin  6. Chest x-ray shows worsening of the congestion  7. Patient underwent on 5/23 left metatarsal amputation and sacral wound debridement  8. Severe sepsis with decubiti ulcer   9. Patient is on Unasyn  10. Chest x-ray shows mild congestive changes with fluid in the minor fissure     [x] High complexity decision making was performed  [x] See my orders for details  HPI   46-year-old lady came in because of leg pain past medical history of hypertension diabetes mellitus peripheral vascular disease CVA she is bedbound she has leg wound and also has sacral decubiti ulcer and she was scheduled for laparoscopic colostomy and sacral wound debridement and amputation of the left tarsometatarsal because of wound infection but her condition got worse her liver enzyme was elevated INR was also elevated she was intubated transferred to ICU was getting vancomycin and Unasyn started on Levophed because of low blood pressure.     PMH:  has a past medical history of Anemia, Chronic kidney disease, Diabetes mellitus ([facility] [address] Hemiplegia affecting dominant side, post-stroke ([facility] [address] (05/04/2022), HTN (hypertension), Hyperkalemia, Hyperlipidemia, Ill-defined condition, Neuropathy, PAD (peripheral artery disease) ([facility] [address] Sciatica, Stroke ([facility] [address] and UTI (urinary tract infection). PSH:   has a past surgical history that includes hx other surgical (Bilateral); hx amputation (Right); hx other surgical; hx cervical fusion; hx vascular stent (Bilateral); hx vascular stent (Bilateral); hx gi; and ir insert non tunl cvc over 5 yrs (5/27/2022). FHX: family history includes Cancer in her mother; Diabetes in her mother; Hypertension in her mother. SHX:  reports that she has never smoked. She has never used smokeless tobacco. She reports previous alcohol use. She reports that she does not use drugs.     ALL: No Known Allergies     MEDS:   [x] Reviewed - As Below   [] Not reviewed    Current Facility-Administered Medications   Medication    VANCOMYCIN RANDOM LAB REMINDER    vancomycin (VANCOCIN) 500 mg in 0.9% sodium chloride (MBP/ADV) 100 mL MBP    calcium polycarbophiL (FIBERCON) tablet 625 mg    ferrous sulfate tablet 325 mg    furosemide (LASIX) injection 40 mg    glycopyrrolate (ROBINUL) injection 0.1 mg    metoprolol tartrate (LOPRESSOR) tablet 12.5 mg    alteplase (CATHFLO) injection 2 mg    fluconazole (DIFLUCAN) 200mg/100 mL IVPB (premix)    aspirin chewable tablet 81 mg    sodium chloride (NS) flush 5-40 mL    sodium chloride (NS) flush 5-40 mL    ondansetron (ZOFRAN) injection 4 mg    enoxaparin (LOVENOX) injection 40 mg    insulin lispro (HUMALOG) injection    glucose chewable tablet 16 g    glucagon (GLUCAGEN) injection 1 mg    hydrALAZINE (APRESOLINE) tablet 12.5 mg    sacubitriL-valsartan (ENTRESTO) 24-26 mg tablet 1 Tablet    ampicillin-sulbactam (UNASYN) 3 g in 0.9% sodium chloride (MBP/ADV) 100 mL MBP    dextrose 10% infusion 0-250 mL    insulin glargine (LANTUS) injection 10 Units    0.9% sodium chloride infusion 250 mL    Vancomycin Pharmacy Dosing    acetaminophen (TYLENOL) tablet 650 mg    Or    acetaminophen (TYLENOL) suppository 650 mg    polyethylene glycol (MIRALAX) packet 17 g    ondansetron (ZOFRAN ODT) tablet 4 mg    famotidine (PEPCID) tablet 20 mg    acidophilus-pectin, citrus tablet 2 Tablet    albuterol-ipratropium (DUO-NEB) 2.5 MG-0.5 MG/3 ML    artificial saliva (MOUTH KOTE) 1 Spray    brimonidine (ALPHAGAN) 0.2 % ophthalmic solution 1 Drop    cholestyramine-aspartame (QUESTRAN LIGHT) packet 4 g    [Held by provider] fenofibrate nanocrystallized (TRICOR) tablet 48 mg    gabapentin (NEURONTIN) capsule 300 mg    insulin glargine (LANTUS) injection 50 Units    latanoprost (XALATAN) 0.005 % ophthalmic solution 1 Drop    traMADoL (ULTRAM) tablet 25 mg      MAR reviewed and pertinent medications noted or modified as needed   Current Facility-Administered Medications   Medication    VANCOMYCIN RANDOM LAB REMINDER    vancomycin (VANCOCIN) 500 mg in 0.9% sodium chloride (MBP/ADV) 100 mL MBP    calcium polycarbophiL (FIBERCON) tablet 625 mg    ferrous sulfate tablet 325 mg    furosemide (LASIX) injection 40 mg    glycopyrrolate (ROBINUL) injection 0.1 mg    metoprolol tartrate (LOPRESSOR) tablet 12.5 mg    alteplase (CATHFLO) injection 2 mg    fluconazole (DIFLUCAN) 200mg/100 mL IVPB (premix)    aspirin chewable tablet 81 mg    sodium chloride (NS) flush 5-40 mL    sodium chloride (NS) flush 5-40 mL    ondansetron (ZOFRAN) injection 4 mg    enoxaparin (LOVENOX) injection 40 mg    insulin lispro (HUMALOG) injection    glucose chewable tablet 16 g    glucagon (GLUCAGEN) injection 1 mg    hydrALAZINE (APRESOLINE) tablet 12.5 mg    sacubitriL-valsartan (ENTRESTO) 24-26 mg tablet 1 Tablet    ampicillin-sulbactam (UNASYN) 3 g in 0.9% sodium chloride (MBP/ADV) 100 mL MBP    dextrose 10% infusion 0-250 mL    insulin glargine (LANTUS) injection 10 Units    0.9% sodium chloride infusion 250 mL  Vancomycin Pharmacy Dosing    acetaminophen (TYLENOL) tablet 650 mg    Or    acetaminophen (TYLENOL) suppository 650 mg    polyethylene glycol (MIRALAX) packet 17 g    ondansetron (ZOFRAN ODT) tablet 4 mg    famotidine (PEPCID) tablet 20 mg    acidophilus-pectin, citrus tablet 2 Tablet    albuterol-ipratropium (DUO-NEB) 2.5 MG-0.5 MG/3 ML    artificial saliva (MOUTH KOTE) 1 Spray    brimonidine (ALPHAGAN) 0.2 % ophthalmic solution 1 Drop    cholestyramine-aspartame (QUESTRAN LIGHT) packet 4 g    [Held by provider] fenofibrate nanocrystallized (TRICOR) tablet 48 mg    gabapentin (NEURONTIN) capsule 300 mg    insulin glargine (LANTUS) injection 50 Units    latanoprost (XALATAN) 0.005 % ophthalmic solution 1 Drop    traMADoL (ULTRAM) tablet 25 mg      PMH:  has a past medical history of Anemia, Chronic kidney disease, Diabetes mellitus ([facility] [address] Hemiplegia affecting dominant side, post-stroke ([facility] [address] (05/04/2022), HTN (hypertension), Hyperkalemia, Hyperlipidemia, Ill-defined condition, Neuropathy, PAD (peripheral artery disease) ([facility] [address] Sciatica, Stroke ([address] and UTI (urinary tract infection). PSH:   has a past surgical history that includes hx other surgical (Bilateral); hx amputation (Right); hx other surgical; hx cervical fusion; hx vascular stent (Bilateral); hx vascular stent (Bilateral); hx gi; and ir insert non tunl cvc over 5 yrs (5/27/2022). FHX: family history includes Cancer in her mother; Diabetes in her mother; Hypertension in her mother. SHX:  reports that she has never smoked. She has never used smokeless tobacco. She reports previous alcohol use. She reports that she does not use drugs. ROS:  Unable to obtain intubated on ventilator    Hemodynamics:    CO:    CI:    CVP:    SVR:   PAP Systolic:    PAP Diastolic:    PVR:    AA52:        Ventilator Settings:      Mode Rate TV Press PEEP FiO2 PIP Min.  Vent   Pressure support,Spontaneous    500 ml 15 cm H2O  5 cm H20 28 %  27 cm H2O 13.2 l/min        Vital Signs: Telemetry:    normal sinus rhythm Intake/Output:   Visit Vitals  BP (!) 181/76 (BP 1 Location: Right upper arm, BP Patient Position: At rest)   Pulse 85   Temp 99.5 °F (37.5 °C)   Resp 17   Ht 5' 6.93\" (1.7 m)   Wt 95.1 kg (209 lb 10.5 oz)   SpO2 94%   Breastfeeding No   BMI 32.91 kg/m²       Temp (24hrs), Av.2 °F (36.8 °C), Min:97.5 °F (36.4 °C), Max:99.5 °F (37.5 °C)        O2 Device: Nasal cannula O2 Flow Rate (L/min): 2 l/min       Wt Readings from Last 4 Encounters:   22 95.1 kg (209 lb 10.5 oz)   04/10/22 86.6 kg (191 lb)   22 82.2 kg (181 lb 3.5 oz)   20 84.4 kg (186 lb)          Intake/Output Summary (Last 24 hours) at 2022 1015  Last data filed at 2022 0710  Gross per 24 hour   Intake 2100 ml   Output 2550 ml   Net -450 ml       Last shift:       07 -  1900  In: 2144 [I.V.:120]  Out: 1050 [Urine:800; Drains:250]  Last 3 shifts: 06/10 1901 -  0700  In: 4526 [I.V.:200]  Out: 3600 [Urine:3600]       Physical Exam:     General: On noninvasive ventilator weakly tries to open her eyes follows no commands  HEENT: NCAT,  Eyes: anicteric; conjunctiva clear, random eye movement when eyelids are open  Neck: no nodes, , trach midline; no accessory MM use. Neck veins obscured by obesity but seems slightly engorged  Chest: no deformity,   Cardiac: R regular; no murmur;   Lungs: Shallow breaths with diffuse wheezes and rhonchi  Abd: soft, NT, hypoactive BS  Ext: Diffuse edema; no joint swelling;  No clubbing  :clear urine  Neuro: Seems to weakly try to open her eyes follows no commands does not speak does not move her extremities  Psych-  unable to assess  Skin: warm, dry, no cyanosis;   Pulses: Brachial and radial pulses intact  Capillary: Normal capillary refill      DATA:    MAR reviewed and pertinent medications noted or modified as needed  MEDS:   Current Facility-Administered Medications   Medication    VANCOMYCIN RANDOM LAB REMINDER  vancomycin (VANCOCIN) 500 mg in 0.9% sodium chloride (MBP/ADV) 100 mL MBP    calcium polycarbophiL (FIBERCON) tablet 625 mg    ferrous sulfate tablet 325 mg    furosemide (LASIX) injection 40 mg    glycopyrrolate (ROBINUL) injection 0.1 mg    metoprolol tartrate (LOPRESSOR) tablet 12.5 mg    alteplase (CATHFLO) injection 2 mg    fluconazole (DIFLUCAN) 200mg/100 mL IVPB (premix)    aspirin chewable tablet 81 mg    sodium chloride (NS) flush 5-40 mL    sodium chloride (NS) flush 5-40 mL    ondansetron (ZOFRAN) injection 4 mg    enoxaparin (LOVENOX) injection 40 mg    insulin lispro (HUMALOG) injection    glucose chewable tablet 16 g    glucagon (GLUCAGEN) injection 1 mg    hydrALAZINE (APRESOLINE) tablet 12.5 mg    sacubitriL-valsartan (ENTRESTO) 24-26 mg tablet 1 Tablet    ampicillin-sulbactam (UNASYN) 3 g in 0.9% sodium chloride (MBP/ADV) 100 mL MBP    dextrose 10% infusion 0-250 mL    insulin glargine (LANTUS) injection 10 Units    0.9% sodium chloride infusion 250 mL    Vancomycin Pharmacy Dosing    acetaminophen (TYLENOL) tablet 650 mg    Or    acetaminophen (TYLENOL) suppository 650 mg    polyethylene glycol (MIRALAX) packet 17 g    ondansetron (ZOFRAN ODT) tablet 4 mg    famotidine (PEPCID) tablet 20 mg    acidophilus-pectin, citrus tablet 2 Tablet    albuterol-ipratropium (DUO-NEB) 2.5 MG-0.5 MG/3 ML    artificial saliva (MOUTH KOTE) 1 Spray    brimonidine (ALPHAGAN) 0.2 % ophthalmic solution 1 Drop    cholestyramine-aspartame (QUESTRAN LIGHT) packet 4 g    [Held by provider] fenofibrate nanocrystallized (TRICOR) tablet 48 mg    gabapentin (NEURONTIN) capsule 300 mg    insulin glargine (LANTUS) injection 50 Units    latanoprost (XALATAN) 0.005 % ophthalmic solution 1 Drop    traMADoL (ULTRAM) tablet 25 mg        Labs:    Recent Labs     06/12/22  0712 06/11/22  0430 06/10/22  0445   WBC 13.1* 13.8* 13.5*   HGB 10.1* 8.5* 7.9*    284 278   INR 1.5*  --   -- Recent Labs     22  1400 22  0430 06/10/22  0445   NA  --  148* 150*   K  --  3.8 3.5   CL  --  117* 120*   CO2  --  24 24   GLU  --  306* 187*   BUN  --  44* 44*   CREA 0.72 0.91 0.92   CA  --  9.1 9.3   MG  --   --  2.2   PHOS  --   --  3.1   ALB  --  2.0* 1.9*   ALT  --  55 67     Recent Labs     06/10/22  0440   PH 7.41   PCO2 37   PO2 93   HCO3 23   6/5 and IV IP 16 EP 5 rate 16 FiO2 30%    AC 12  PEEP 5 FiO2 30%   echo ejection fraction 20% mild mitral regurg left atrium 3.5 cm RVSP 35  , 1313, 1361    Lab Results   Component Value Date/Time    Culture result: Heavy  Mixed skin yasmine isolated   2022 06:15 PM    Culture result: Rare Normal respiratory yasmine 2022 02:48 PM    Culture result: Heavy Acinetobacter baumannii complex 05/15/2022 07:15 PM    Culture result: Heavy Diphtheroids 05/15/2022 07:15 PM     Lab Results   Component Value Date/Time    TSH 2.880 2016 10:13 AM        Imagin/22 chest x-ray with ET tube in place hazy accentuated basilar markings with small amount of fluid in the minor fissure  Results from Hospital Encounter encounter on 22    XR CHEST PORT    Narrative  Chest single view. Comparison single view chest 2022. Removal right CVC. Patchy reticular markings same distribution through lungs. Cardiac and  mediastinal structures unchanged noting thoracic aorta atherosclerosis. No  pneumothorax or pleural effusion. Cervical hardware. Results from [place] encounter on 22    CT ABD PELV WO CONT    Narrative  CT ABD PELV WO CONT    Technique: Noncontrasted CT scan of the abdomen and pelvis was performed  utilizing transaxial images obtained from the dome of the diaphragm to the pubic  symphysis. Coronal and sagittal reconstructions were generated.     Dose Reduction:  All CT scans at this facility are performed using dose reduction optimization  techniques as appropriate to a performed exam including the following: Automated  exposure control, adjustments of the mA and/or kV according to patient size, or  use of iterative reconstruction technique. Comparison:  2/25/2022. Findings:  Small-moderate pleural effusions are present with bibasilar  atelectasis. Atherosclerotic calcifications are again evident including coronary  artery calcifications. Gastrostomy tube tip in the gastric antrum. The liver, spleen, pancreas, and  adrenal glands are unremarkable for noncontrasted technique. Stable lobulated  kidneys. Negative for hydronephrosis. Gallbladder is unremarkable. No biliary  duct dilatation apparent. The abdominal aorta is normal in caliber. There is no evidence of bowel obstruction. Small volume ascites has developed. The urinary bladder is decompressed by a Bell catheter. The uterus is  unremarkable. The appendix is normal.    There is mild body wall edema. No suspicious lytic or blastic lesion evident. Impression  Third spacing of fluids with small volume ascites, small-moderate  pleural effusions, and mild body wall edema. Bibasilar atelectasis. 5/20 intubated on ventilator we will continue current vent settings patient is supposed to go for surgery because of transaminitis elevated liver enzyme we will wait as per surgeon for sacral wound debridement and diverting loop colostomy off Levophed, improvement in AST and ALT  5/21 continue with the current vent settings ABG acceptable liver enzyme improving awaiting for the surgery  5/22 maintain ventilator as is in anticipation of surgery.   Renal function improved mild congestion on chest x-ray we will give 1 dose Lasix and potassium  5/23 patient is going for surgery today we will leave ventilator as it is INR is 1.5  5/25 patient remained on ventilator intubated doing well, patient received vitamin K fresh frozen plasma schedule for laparoscopic loop colostomy possible today  5/26 no surgery has been plans will start weaning do sedation vacation  5/27 try to do sedation vacation to wean patient blood pressure dropped still on Levophed we will continue to wean discussed with the family at bedside  5/28 remains on ventilator sedated and also on Levophed possible surgery next week  5/29 on ventilator ET tube advisement 1 to 1.5 cm we will repeat chest x-ray ABG acceptable  5/30 continue with current vent setting  5/31 awaiting for possible surgery if not we will start weaning  6/1 remain intubated on ventilator surgery has been delayed we will start weaning and plan for extubation  6/2 no surgery was planned so we will try to wean she is off Levophed we will do sedation vacation and if weaning cardio acceptable will extubate  6/3 change vent settings to spontaneous if weaning creatinine acceptable will extubate  6/4 extubated on 6/3 now on nasal cannula tolerating well  6/5 respiratory distress last night placed on noninvasive ventilator chest x-ray continues to show fluid overload. Will increase Lasix to 40 mg twice daily give albumin today. Likely has continued aspiration. Will increase Robinul to every 6 hours dosing  6/6 patient is alert awake he is full code we will try to wean BiPAP put on nasal cannula and keep BiPAP at night  6/7 noninvasive ventilator Machine we will change it to high flow nasal cannula  6/8 on noninvasive ventilator BiPAP machine chest x-ray shows improvement overall condition prognosis poor  6/10 transition from BiPAP to nasal cannula.   3 L ABG acceptable

## 2022-06-12 NOTE — PROGRESS NOTES
Problem: Falls - Risk of  Goal: *Absence of Falls  Description: Document Damari Spray Fall Risk and appropriate interventions in the flowsheet.   Outcome: Progressing Towards Goal  Note: Fall Risk Interventions:  Mobility Interventions: Bed/chair exit alarm    Mentation Interventions: Bed/chair exit alarm    Medication Interventions: Bed/chair exit alarm    Elimination Interventions: Bed/chair exit alarm

## 2022-06-13 NOTE — WOUND CARE
Negative Pressure    NAME:  Elmer Jimenes  YOB: 1945  MEDICAL RECORD NUMBER:  [id number]  DATE:  5/14/2022     Applied Negative Pressure to surgical incision to sacrum/buttocks   [x] Applied skin barrier prep to leah-wound.  [x] Cut strips of plastic drape to picture frame wound so that leah-wound is     covered with the drape.  [] If bridging dressing to less prominent site, cover any intact skin that will come in contact with the Negative Pressure Therapy sponge, gauze or channel drain with plastic drape. The sponge should never touch intact skin.  [x] Cut sponge, gauze or channel drain to size which will fit into the wound/ulcer bed without being forced.  [x] Be sure the sponge is large enough to hold the entire round plastic flange which is attached to the tubing. Never allow flange to be larger than the sponge or it will produce suction damaging intact skin.  Total number of individual pieces of foam used within the wound bed: 1 x base foam (honeycomb); 3 x cover foam   [] If bridging the dressing away from the primary site, be sure the bridge leads to a piece of sponge large enough to hold the entire flange without allowing any of the flange to overlap onto intact skin.  [x] Covered sponge, gauze or channel drain with plastic drape.  [x] Cut a hole in this plastic drape directly over the sponge the same size as the plastic drain tubing.  [x] Removed plastic liner from flange and apply it directly over the hole you cut.  [x] Removed the plastic cover from the flange.  [x] Attached the tubing to the wound/ulcer Negative Pressure Therapy and turn it on to be sure a vacuum is created and that there are no leaks.  [] If air leaks occur, use plastic drape to patch them.  [] Secured Negative Pressure Therapy dressing with ace wrap loosely if located on an extremity. Maintain tubing outside of ace wrap. Tubing must not exert pressure on intact skin.     Applied per  Guidelines  Red granulation tissue noted to wound margins with devitalized tissue / slough located centrally to superior. Bone exposure remains evident at coccyx. Area to right buttock feels more boggy and is able to be pressed inward more than surrounding tissue. Concern for possible tunnel formation beneath devitalized tissue to that area. Informed Dr. Meaghan Andrade. Received consent for picture via Baby Didier, Daughter. Applied strip paste at inferior aspect of periwound above anus and then applied drape. Applied Exuderm over inferior aspect of drape. Applied foam dressings from right periwound to right anterior thigh. Attached track pad over foam dressing. Secured track pad tubing to foam dressing to prevent skin injury. Covered drape and Exuderm with foam dressing. Clean stool off of foam dressing and leave intact. If stool compromises drape and foam, remove and apply a wet/dry dressing, and inform WCN. Ordered a Envella Air-Fluidized bed from [facility], confirmation [id number]. The bed should deliver this evening. Next dressing change will be for Thursday 6/16/22. Will continue to follow. If wound vac is not working properly: 1) Check to see if track pad tubing is clogged. If so, remove track pad, apply film over exposed foam, cut dime size hole in film, and apply a new track pad. 2) If film is leaking, find area with leak and reinforce with film. 3) If leak cannot be fixed, remove dressing, apply wet/dry dressing, and inform the WCN. Media Information             Document Information    Photographic Image:  Mobile Media Capture   Sacrum   06/13/2022 14:55   Attached To:    Hospital Encounter on 5/14/22     Source Information    Gladis Boggs RN  [facility]       Electronically signed by Ardeen Gottron, RN on 6/13/2022 at 3:02 PM

## 2022-06-13 NOTE — PROGRESS NOTES
Vancomycin Dosing Consult  Day # 28 of vancomycin therapy  Consult ordered by Dr. Elzbieta Andrews for this 78 y.o. female for indication of  sacral wound infection, sepsis. Antibiotic regimen: Vancomycin + Unasyn  + fluconazole    Temp (24hrs), Av.5 °F (36.9 °C), Min:97.5 °F (36.4 °C), Max:99.8 °F (37.7 °C)    Recent Labs     22  0712 22  0430 06/10/22  0445   WBC 13.1* 13.8* 13.5*     Recent Labs     22  1603 22  1400 22  0430 06/10/22  0445 06/10/22  0445   CREA 0.83 0.72 0.91   < > 0.92   BUN  --   --  44*  --  44*    < > = values in this interval not displayed. Recent Labs     22  0712   CRP 10.90*     Recent Labs     22  0712   PCT 0.67*       Estimated Creatinine Clearance: 69.2 mL/min (based on SCr of 0.83 mg/dL). ml/min  Concomitant nephrotoxic drugs: Loop diuretics    Cultures:    Wound: Moderate MDR Pseudomonas aeruginosa susceptible to Zerbaxa, aminoglycosides), moderate mixed skin yasmine, moderate mixed enteric yasmine including yeast, final   Blood: CoNS in the anaerobic bottle, final   Wound, ulcer:  Moderate Acinetobacter baumannii, moderate Enterococcus faecium, light >2 organisms (contraindicated), final  5/15 Wound, great toe: Heavy Acinetobacter baumannii complex (Zosyn resistant, susceptible to Unasyn), heavy Diphtheroids, final   Tissue: Heavy mixed skin yasmine, final    MRSA Swab: Not ordered, patient already received first dose of vancomycin    Target range: AUC/GEMA 400-600    Recent level history (3):  Date/Time Previous Dose & Interval Measured Level (mcg/mL) Associated AUC/GEMA    1400 500mg IV q24h 15.9     1603 500mg IV q24h 17 440           Ht Readings from Last 1 Encounters:   22 170 cm (66.93\")     Wt Readings from Last 1 Encounters:   22 95.1 kg (209 lb 10.5 oz)     Ideal body weight: 61.4 kg (135 lb 7.1 oz)  Adjusted ideal body weight: 74.9 kg (165 lb 2.1 oz)        Assessment/Plan:   WBC, CRP, Procal trending down  Vancomycin AUC is therapeutic  Continue the same regimen  Antimicrobial stop date TBD

## 2022-06-13 NOTE — PROGRESS NOTES
General Daily Progress Note          Patient Name:   Lavell Wu       YOB: 1945       Age:  78 y.o. Admit Date: 5/14/2022      Subjective:     78years old female with significant past medical history of CVA with PEG tube chronic kidney disease CVA with right-sided weakness bedbound DVT of the left great toe status post removal of the left great and second toe history of aspiration pneumonia history of sacral decubitus ulcer patient was recently discharged from the hospitalPatient discharged to nursing home upon p.o. Cipro    Admitted again yesterday concerning for worsening of sacral ulcer    During last admission patient was seen by infectious disease and also seen by the vascular surgeon patient had debridement of wound during the last admission    Patient seen by the infectious disease yesterday    Sacral wound infection recent cultures growing Pseudomonas resistant to Zosyn and meropenem    5/17    Patient alert awake not in distress  Patient was seen by the vascular surgeon    Vascular surgery planned for laparoscopic loop colostomy placement and sacral debridement then wound vacuum  Also try to close the wound on the left foot with TMA    5/18    Resting in the bed not in any distress  Blood sugars running high    Seen by infectious disease continue vancomycin and start on Unasyn    5/19    And went to the OR intubated because of elevated LFT and elevated INR  Surgery was canceled    On ventilator 40% O2  Propofol drip    5/20    Patient on ventilator with 30% O2  On propofol drip    Hemoglobin dropped to 6.8    Patient's daughter at the bedside    5/20    Patient on ventilator 30% O2  On propofol drip  Getting PEG tube feeding    5/21    Patient still on ventilator 30% O2  On propofol drip  Liver Function improving    5/22  On ventilator with 30% O2 on propofol drip    5/24  Awaiting tissue culture results.    On ventilator with 30% O2 on propofol drip  Left transmetatarsal amputation and Sacral wound excisional wound debridement 13 x 15 cm to the bone completed yesterday. CXR: Hazy mid and lower lung reticular markings.  Dependent small to moderate volume,  right greater than left pleural effusions  Remarkable labs   WBC: 15.2   Hgb: 8.5   Na; 148  CRP: 13.60   Procal: 0.71      5/25    Patient on ventilator with 30% O2  Seen by the vascular surgeon and plan for surgery today    5/26  Patient on ventilator with 30% O2  Hypotensive on Levophed  On propofol drip    Surgery was canceled yesterday because patient unstable hemodynamically not cleared by the anesthesia    5/27    Patient on ventilator with 30% O2  Hypotensive on Levophed  Getting PEG tube feeding    5/30     Patient on ventilator with 21% O2  On propofol drip  Off pressor    5/31    Patient on ventilator with 21% O2  On propofol drip    Off pressor  Patient have a wound vacuum      6/1    Patient scheduled for surgery today    6/2    Surgery was canceled yesterday  As overall prognosis very poor  Patient on vent on 21% O2  Getting PEG tube feeding  Wound vacuum intact      6/3    Patient still on ventilator  Seen by vascular surgeon left foot concern of ischemic changes    6/4    Patient extubated /tachypneic daughter at the bedside    6/5    Patient on BiPAP  30% O2    6/6    Patient on BiPAP 30% O2    Awake not in distress    6/7    Still on BiPAP 30% O2  Awake    6/8    Patient alert awake on BiPAP 30% O2 patient's daughter at the bedside  Still waiting for family decision regarding further treatment plan  Regarding hospice    6/9    Patient sleeping in the bed on BiPAP 30% percent O2  Wound vacuum in place    6/10    Patient off BiPAP on nasal cannula 3 L    6/11    On nasal cannula oxygen saturation 99%  Patient spiked fever 102.7 early this morning          Objective:     Visit Vitals  BP (!) 103/42 (BP 1 Location: Right upper arm, BP Patient Position: At rest)   Pulse 100   Temp (!) 102.7 °F (39.3 °C)   Resp 20   Ht 5' 6.93\" (1.7 m)   Wt 95.1 kg (209 lb 10.5 oz)   SpO2 99%   Breastfeeding No   BMI 32.91 kg/m²        Recent Results (from the past 24 hour(s))   GLUCOSE, POC    Collection Time: 06/12/22 10:56 AM   Result Value Ref Range    Glucose (POC) 169 (H) 65 - 117 mg/dL    Performed by Laquita Bourne, TROUGH    Collection Time: 06/12/22  4:03 PM   Result Value Ref Range    Vancomycin,trough 17.0 (H) 5.0 - 10.0 ug/mL   CREATININE    Collection Time: 06/12/22  4:03 PM   Result Value Ref Range    Creatinine 0.83 0.55 - 1.02 mg/dL   GLUCOSE, POC    Collection Time: 06/12/22  4:30 PM   Result Value Ref Range    Glucose (POC) 185 (H) 65 - 117 mg/dL    Performed by Damon Owens    GLUCOSE, POC    Collection Time: 06/12/22  8:59 PM   Result Value Ref Range    Glucose (POC) 202 (H) 65 - 117 mg/dL    Performed by Patricia Medina    GLUCOSE, POC    Collection Time: 06/13/22 12:37 AM   Result Value Ref Range    Glucose (POC) 172 (H) 65 - 117 mg/dL    Performed by Patricia Medina    GLUCOSE, POC    Collection Time: 06/13/22  5:32 AM   Result Value Ref Range    Glucose (POC) 210 (H) 65 - 117 mg/dL    Performed by Patricia Medina      [unfilled]      Review of Systems    Not a good historian e. Physical Exam:      Constitutional: On ventilator  HENT:   Head: Normocephalic and atraumatic. Eyes: Pupils are equal, round, and reactive to light. EOM are normal.   Cardiovascular: Normal rate, regular rhythm and normal heart sounds. Pulmonary/Chest: Breath sounds normal. No wheezes. No rales. Exhibits no tenderness. Abdominal: Soft. Bowel sounds are normal. There is no abdominal tenderness. There is no rebound and no guarding. Musculoskeletal: Normal range of motion. Neurological: On ventilator   skin sacral decubitus ulcer and left foot wound right big toe amputation    XR CHEST PORT   Final Result      XR CHEST PORT   Final Result   1.   There has been an improvement in the pulmonary edema pattern particularly   within the left perihilar region. There is persistent milder interstitial edema   within the right lung. 2.  This examination is negative for new pulmonary pathology or additional   interval changes. XR CHEST PORT   Final Result      XR CHEST PORT   Final Result   Pulmonary vascular congestion and predominantly perihilar opacities,   increased. XR CHEST PORT   Final Result   Worsening lung aeration. XR CHEST PORT   Final Result   No significant change. XR CHEST PORT   Final Result   No significant change. XR CHEST PORT   Final Result      XR CHEST PORT   Final Result      XR CHEST PORT   Final Result   No interval change or acute process. XR CHEST PORT   Final Result   ET tube retracted from previous, with tip now at the level of the   thoracic inlet, 8-9 cm above the jennifer. Stable appearance of right central   line. Stable mild enlargement of cardiopericardial silhouette. No evidence of   pulmonary edema, air space pneumonia, or pleural effusion. No evidence of   pneumothorax. Some structures are partially obscured by overlying monitoring   electrodes. XR CHEST PORT   Final Result   Basilar airspace disease, possibly subsegmental atelectasis, stable. XR CHEST PORT   Final Result   Cardiomegaly with vascular congestion. Stable appearance of ET tube. Right central line placed, without radiographic evidence of complication. IR INSERT NON TUNL CVC OVER 5 YRS   Final Result   Successful placement of a triple-lumen central venous catheter. The   catheter is ready for use. XR CHEST PORT   Final Result      XR CHEST PORT   Final Result      XR CHEST PORT   Final Result      CT ABD PELV WO CONT   Final Result   Third spacing of fluids with small volume ascites, small-moderate   pleural effusions, and mild body wall edema. Bibasilar atelectasis. XR CHEST PORT   Final Result   Similar findings compared to single view chest 1 day earlier.       XR CHEST PORT   Final Result Findings/IMPRESSION: Stable appearance of endotracheal tube. Stable mild   enlargement of cardiomediastinal silhouette. Central vascular congestion with   bilateral perihilar and basilar opacities, consistent with edema and/or   pneumonia, right greater than left. Possible right pleural effusion. No   pneumothorax. XR CHEST PORT   Final Result   Bibasilar opacities, possibly increased on the right. XR CHEST PORT   Final Result   No significant change allowing for difference in technique and   positioning. Single portable AP view compared to yesterday. Suboptimal inspiratory film   compromises visualization of the lower lung fields. Monitoring equipment   overlies the body. The tip of the tube is approximately 3 cm above the jennifer. Mild apparent cardiomegaly. Left heart border and left diaphragm obscured. Hazy airspace opacification both lung bases may be a combination of atelectasis,   pneumonia, or edema. No large effusion. Negative for pneumothorax. XR CHEST PORT   Final Result   No significant change allowing for difference in technique and   positioning. Single portable AP view compared to yesterday. Rotation to the right. Monitoring   equipment overlies the body. Suboptimal inspiratory film compromises   visualization of the lower lung fields. Mild cardiomegaly. The tip of the ET tube is approximately 3 cm above the jennifer. Mild basal interstitial edema. No new consolidation. No pleural effusion or   pneumothorax. XR CHEST PORT   Final Result   1. The endotracheal tube is in satisfactory position. 2.  There has been a partial interval resolution in the pulmonary interstitial   edema pattern. XR CHEST PORT   Final Result   Ill-defined haziness in the mid to lower lung zones suspect for mild   atelectatic changes and/or interstitial fluid or pleural fluid. US ABD LTD   Final Result   1. Question pericholecystic fluid.  No identified gallstones or sludge. 2. Right pleural effusion. 3. Increased right renal echotexture for medical renal disease. XR CHEST PORT   Final Result   Intubation. Findings suggesting hydrostatic edema. XR CHEST PORT   Final Result   No evidence of an acute cardiopulmonary process.       IR FLUORO GUIDE PLC CVAD    (Results Pending)   IR US GUIDED VASCULAR ACCESS    (Results Pending)        Recent Results (from the past 24 hour(s))   GLUCOSE, POC    Collection Time: 06/12/22 10:56 AM   Result Value Ref Range    Glucose (POC) 169 (H) 65 - 117 mg/dL    Performed by Aleksandr Watts, TROUGH    Collection Time: 06/12/22  4:03 PM   Result Value Ref Range    Vancomycin,trough 17.0 (H) 5.0 - 10.0 ug/mL   CREATININE    Collection Time: 06/12/22  4:03 PM   Result Value Ref Range    Creatinine 0.83 0.55 - 1.02 mg/dL   GLUCOSE, POC    Collection Time: 06/12/22  4:30 PM   Result Value Ref Range    Glucose (POC) 185 (H) 65 - 117 mg/dL    Performed by Jeb Mayfield    GLUCOSE, POC    Collection Time: 06/12/22  8:59 PM   Result Value Ref Range    Glucose (POC) 202 (H) 65 - 117 mg/dL    Performed by Erik Palin    GLUCOSE, POC    Collection Time: 06/13/22 12:37 AM   Result Value Ref Range    Glucose (POC) 172 (H) 65 - 117 mg/dL    Performed by Erik Palin    GLUCOSE, POC    Collection Time: 06/13/22  5:32 AM   Result Value Ref Range    Glucose (POC) 210 (H) 65 - 117 mg/dL    Performed by Erik Palin        Results     Procedure Component Value Units Date/Time    CULTURE, Minerva Reap STAIN [[id number]] Collected: 06/01/22 1900    Order Status: Canceled Specimen: Wound from [facility], Minerva Reap STAIN [[id number]] Collected: 06/01/22 1845    Order Status: Canceled Specimen: Wound from PEG Site     CULTURE, Minerva Reap STAIN [[id number]] Collected: 05/30/22 1800    Order Status: Canceled Specimen: Sacral Wound            Labs:     Recent Labs     06/12/22  0712 06/11/22  0430   WBC 13.1* 13.8*   HGB 10.1* 8.5*   HCT 32.6* 28.1*    284     Recent Labs     06/12/22  1603 06/11/22  1400 06/11/22  0430   NA  --   --  148*   K  --   --  3.8   CL  --   --  117*   CO2  --   --  24   BUN  --   --  44*   CREA 0.83 0.72 0.91   GLU  --   --  306*   CA  --   --  9.1     Recent Labs     06/11/22  0430   ALT 55   *   TBILI 0.5   TP 6.9   ALB 2.0*   GLOB 4.9*     Recent Labs     06/12/22  0712   INR 1.5*   PTP 17.9*      No results for input(s): FE, TIBC, PSAT, FERR in the last 72 hours. No results found for: FOL, RBCF   No results for input(s): PH, PCO2, PO2 in the last 72 hours. No results for input(s): CPK, CKNDX, TROIQ in the last 72 hours.     No lab exists for component: CPKMB  Lab Results   Component Value Date/Time    Cholesterol, total 124 03/08/2022 07:40 AM    HDL Cholesterol 30 03/08/2022 07:40 AM    LDL,Direct 79 06/28/2021 12:00 AM    LDL, calculated 44.8 03/08/2022 07:40 AM    Triglyceride 202 (H) 05/16/2022 06:20 AM    CHOL/HDL Ratio 4.1 03/08/2022 07:40 AM     Lab Results   Component Value Date/Time    Glucose (POC) 210 (H) 06/13/2022 05:32 AM    Glucose (POC) 172 (H) 06/13/2022 12:37 AM    Glucose (POC) 202 (H) 06/12/2022 08:59 PM    Glucose (POC) 185 (H) 06/12/2022 04:30 PM    Glucose (POC) 169 (H) 06/12/2022 10:56 AM     Lab Results   Component Value Date/Time    Color Yellow/Straw 05/31/2022 05:56 AM    Appearance Turbid (A) 05/31/2022 05:56 AM    Specific gravity 1.006 05/31/2022 05:56 AM    pH (UA) 5.0 05/31/2022 05:56 AM    Protein 30 (A) 05/31/2022 05:56 AM    Glucose Negative 05/31/2022 05:56 AM    Ketone Negative 05/31/2022 05:56 AM    Bilirubin Negative 05/31/2022 05:56 AM    Urobilinogen 0.1 05/31/2022 05:56 AM    Nitrites Negative 05/31/2022 05:56 AM    Leukocyte Esterase Large (A) 05/31/2022 05:56 AM    Bacteria Negative 05/31/2022 05:56 AM    WBC >100 (H) 05/31/2022 05:56 AM    RBC  05/31/2022 05:56 AM         Assessment:   Acute respiratory failure extubated on nasal cannula   sacral decubitus ulcer postdebridement  Sepsis with leukocytosis elevated procalcitonin and CRP  Left foot wound  Recent removal of left great toe and second toe  CVA with PEG tube placement  History of aspiration pneumonia  History of chronic kidney disease  CAD with ejection fraction about 20 to 25%  Hypertension  Hyperlipidemia  Anemia of chronic disease  Hyperkalemia  Static post transfusion  Uncontrolled diabetes  Hepatic shock/improving  Coagulopathy  Elevated  Troponin  Bacteremia with coagulase-negative Staphylococcus  Sacral wound secondary to Acinetobacter  and Enterococcus  Procedure:  1. Left transmetatarsal amputation. 2.  Sacral wound excisional wound debridement 13 x 15 cm to the bone.     Hypotension off  Levophed  Hypokalemia/replace potassium  Severe protein calorie malnutrition  Hypernatremia  Plan:     Water flush 200 cc every 4 hours  Unasyn 3 g IV every 8 hours  Aspirin 81 mg daily  FiberCon 625 mg daily  Questran 4 g twice a day  Lovenox 40 subcu daily  Pepcid 20 twice daily  Ferrous sulfate 325 mg twice a day  Flucanazole 200 mg every 24 hours  Lasix 40 mg every 12 hours  Gabapentin 300 mg twice a day  Robinul 0.1 mg every 6 hours  Hydralazine 12.5 mg 3 times a day  Lantus 10 units subcu at bedtime  Lantus 50 units subcu daily  Lopressor 12.5 twice daily  Entresto 1 tab twice a day  Repeat  the labs overall prognosis very poor    Detailed discussion with the patient's daughter about poor prognosis   At bedside  laparoscopic loop colostomy for fecal diversion canceled    Monitor H&H    Continue current medications    Discussed with the patient daughter this morning she is upset about the patient care regarding nurses    Wound care nurse consult for wound vacuum  Discussed with the patient nurse    We will repeat the labs in the morning             Current Facility-Administered Medications:     [START ON 6/14/2022] VANCOMYCIN RANDOM LAB REMINDER, , Other, ONCE, Adria Soto MD    vancomycin [facility]) 500 mg in 0.9% sodium chloride (MBP/ADV) 100 mL MBP, 500 mg, IntraVENous, Q24H, Israel Soto MD, Last Rate: 100 mL/hr at 06/12/22 2102, 500 mg at 06/12/22 2102    calcium polycarbophiL (FIBERCON) tablet 625 mg, 1 Tablet, Oral, DAILY, Israel Soto MD, 625 mg at 06/12/22 1110    ferrous sulfate tablet 325 mg, 1 Tablet, Oral, BID, Israel Soot MD, 325 mg at 06/12/22 2020    furosemide (LASIX) injection 40 mg, 40 mg, IntraVENous, Q12H, Tyrese Zepeda MD, 40 mg at 06/12/22 2020    glycopyrrolate (ROBINUL) injection 0.1 mg, 0.1 mg, IntraVENous, Q6H, Tyrese Zepeda MD, 0.1 mg at 06/13/22 0325    metoprolol tartrate (LOPRESSOR) tablet 12.5 mg, 12.5 mg, Per NG tube, BID, Israel Soto MD, 12.5 mg at 06/12/22 2020    alteplase (CATHFLO) injection 2 mg, 2 mg, InterCATHeter, PRN, Dulce Rivera MD, 2 mg at 06/03/22 1708    fluconazole (DIFLUCAN) 200mg/100 mL IVPB (premix), 200 mg, IntraVENous, Q24H, Corie Sanchez MD, Last Rate: 100 mL/hr at 06/12/22 1116, 200 mg at 06/12/22 1116    aspirin chewable tablet 81 mg, 81 mg, Per G Tube, DAILY, Israel Soto MD, 81 mg at 06/12/22 1114    sodium chloride (NS) flush 5-40 mL, 5-40 mL, IntraVENous, Q8H, Moses Matthew MD, 10 mL at 06/13/22 0542    sodium chloride (NS) flush 5-40 mL, 5-40 mL, IntraVENous, PRN, Prashanth Mcneil MD, 10 mL at 05/31/22 2213    [DISCONTINUED] ondansetron (ZOFRAN ODT) tablet 4 mg, 4 mg, Oral, Q8H PRN **OR** ondansetron (ZOFRAN) injection 4 mg, 4 mg, IntraVENous, Q6H PRN, Radha Bob MD    enoxaparin (LOVENOX) injection 40 mg, 40 mg, SubCUTAneous, DAILY, Radha Bob MD, 40 mg at 06/12/22 1113    insulin lispro (HUMALOG) injection, , SubCUTAneous, Q6H, Radha Bob MD, 2 Units at 06/13/22 0600    glucose chewable tablet 16 g, 4 Tablet, Oral, PRN, Radha Bob MD    glucagon [facility]) injection 1 mg, 1 mg, IntraMUSCular, PRN, Radha Bob MD    hydrALAZINE (APRESOLINE) tablet 12.5 mg, 12.5 mg, Oral, TID, Lydia Bob MD, 12.5 mg at 06/12/22 2103    sacubitriL-valsartan (ENTRESTO) 24-26 mg tablet 1 Tablet, 1 Tablet, Oral, Q12H, Lydia Bob MD, 1 Tablet at 06/11/22 2115    ampicillin-sulbactam (UNASYN) 3 g in 0.9% sodium chloride (MBP/ADV) 100 mL MBP, 3 g, IntraVENous, Q8H, Lydia Bob MD, Last Rate: 200 mL/hr at 06/13/22 0541, 3 g at 06/13/22 0541    dextrose 10% infusion 0-250 mL, 0-250 mL, IntraVENous, PRN, Lydia Bob MD, 125 mL at 05/19/22 0537    insulin glargine (LANTUS) injection 10 Units, 10 Units, SubCUTAneous, QHS, Lydia Bob MD, 10 Units at 06/12/22 2200    0.9% sodium chloride infusion 250 mL, 250 mL, IntraVENous, PRN, Lydia Bob MD, Last Rate: 15 mL/hr at 05/22/22 0820, Rate Verify at 05/22/22 0820    Vancomycin Pharmacy Dosing, , Other, Rx Dosing/Monitoring, Lydia Bob MD    acetaminophen (TYLENOL) tablet 650 mg, 650 mg, Oral, Q6H PRN, 650 mg at 06/13/22 0055 **OR** acetaminophen (TYLENOL) suppository 650 mg, 650 mg, Rectal, Q6H PRN, Lydia Bob MD, 650 mg at 05/16/22 2223    polyethylene glycol (MIRALAX) packet 17 g, 17 g, Oral, DAILY PRN, Lydia Bob MD    ondansetron (ZOFRAN ODT) tablet 4 mg, 4 mg, Oral, Q8H PRN **OR** [DISCONTINUED] ondansetron (ZOFRAN) injection 4 mg, 4 mg, IntraVENous, Q6H PRN, Lydia Bob MD    famotidine (PEPCID) tablet 20 mg, 20 mg, Oral, BID, Lydia Bob MD, 20 mg at 06/12/22 2020    acidophilus-pectin, citrus tablet 2 Tablet, 2 Tablet, Oral, DAILY, Lydia Bob MD, 2 Tablet at 06/12/22 1114    albuterol-ipratropium (DUO-NEB) 2.5 MG-0.5 MG/3 ML, 3 mL, Nebulization, Q6H PRN, Lydia Bob MD    artificial saliva (MOUTH KOTE) 1 Spray, 1 Spray, Oral, TID, Lydia Bob MD, 1 Myton at 06/12/22 2124    brimonidine (ALPHAGAN) 0.2 % ophthalmic solution 1 Drop, 1 Drop, Both Eyes, TID, Lydia Bob MD, 1 Drop at 06/12/22 2125    cholestyramine-aspartame (QUESTRAN LIGHT) packet 4 g, 4 g, Oral, BID WITH Beltran Wiley MD, 4 g at 06/12/22 2111    [Held by provider] fenofibrate nanocrystallized (TRICOR) tablet 48 mg, 48 mg, Oral, DAILY, Paulino, Colette Rosales MD, 48 mg at 05/19/22 6588    gabapentin (NEURONTIN) capsule 300 mg, 300 mg, Oral, BID, Colette Bob MD, 300 mg at 06/12/22 2020    insulin glargine (LANTUS) injection 50 Units, 50 Units, SubCUTAneous, DAILY, Colette Bob MD, 50 Units at 06/11/22 0846    latanoprost (XALATAN) 0.005 % ophthalmic solution 1 Drop, 1 Drop, Right Eye, QPM, Colette Bob MD, 1 Drop at 06/12/22 2124    traMADoL (ULTRAM) tablet 25 mg, 25 mg, Oral, Q12H PRN, Lavera Sandifer, MD, 25 mg at 06/12/22 1115

## 2022-06-13 NOTE — PROGRESS NOTES
Problem: Falls - Risk of  Goal: *Absence of Falls  Description: Document Lakesha Mandes Fall Risk and appropriate interventions in the flowsheet. Outcome: Progressing Towards Goal  Note: Fall Risk Interventions:  Mobility Interventions: Bed/chair exit alarm    Mentation Interventions: Bed/chair exit alarm    Medication Interventions: Bed/chair exit alarm    Elimination Interventions: Bed/chair exit alarm              Problem: Diabetes Self-Management  Goal: *Monitoring blood glucose, interpreting and using results  Description: Identify recommended blood glucose targets  and personal targets.   Outcome: Progressing Towards Goal

## 2022-06-13 NOTE — PROGRESS NOTES
26  CM met with daughter at the bedside to discuss DCP. Patient's daughter called Maryse,patient's daughter and put her on speaker so we could discuss DCP and choices. [facility] stated she has a few more agencies to f/u with from the choice lists. [facility] will f/u with the CM tomorrow with a choice for [facility].      [address], [address]

## 2022-06-13 NOTE — PROGRESS NOTES
Pt has high temp of 102.7. Tylenol given to pt. Attending provider notified. Temp retaken . Temp noted 102. 5. attending provider called and notified. Pt to have cooling blanket placed. Veronica packs placed on pt underarms and sides. Will continue to follow.

## 2022-06-13 NOTE — PROGRESS NOTES
IMPRESSION:   1. Acute hypoxic respiratory failure extubated patient is now on 2 L nasal cannula  2. Pulmonary edema resolved  3. Aspiration chronically  4. Sacral decubiti ulcer with Acinetobacter and Enterococcus  5. Severe sepsis   6. Left foot wound  7. Candidal UTI  8. Shock liver resolved  9. Hypokalemia repleted  10. Symptomatic anemia stable after transfusion  11. Cardiomyopathy ejection fraction 50 to 55%      RECOMMENDATIONS/PLAN:     1. Extubated on 6/3 on nasal canula  2. Continue with Lasix  3. Drop in hemoglobin will continue to follow  4. New onset cardiomyopathy with the Lasix chest x-ray shows improvement in the pleural effusion  5. Persistent infection on Unasyn and vancomycin  6. Chest x-ray shows worsening of the congestion  7. Patient underwent on 5/23 left metatarsal amputation and sacral wound debridement  8. Severe sepsis with decubiti ulcer   9. Patient is on Unasyn  10. Chest x-ray shows mild congestive changes with fluid in the minor fissure     [x] High complexity decision making was performed  [x] See my orders for details  HPI   51-year-old lady came in because of leg pain past medical history of hypertension diabetes mellitus peripheral vascular disease CVA she is bedbound she has leg wound and also has sacral decubiti ulcer and she was scheduled for laparoscopic colostomy and sacral wound debridement and amputation of the left tarsometatarsal because of wound infection but her condition got worse her liver enzyme was elevated INR was also elevated she was intubated transferred to ICU was getting vancomycin and Unasyn started on Levophed because of low blood pressure.     PMH:  has a past medical history of Anemia, Chronic kidney disease, Diabetes mellitus ([address] Hemiplegia affecting dominant side, post-stroke ([address] (05/04/2022), HTN (hypertension), Hyperkalemia, Hyperlipidemia, Ill-defined condition, Neuropathy, PAD (peripheral artery disease) ([address] Sciatica, Stroke ([address] and UTI (urinary tract infection). PSH:   has a past surgical history that includes hx other surgical (Bilateral); hx amputation (Right); hx other surgical; hx cervical fusion; hx vascular stent (Bilateral); hx vascular stent (Bilateral); hx gi; and ir insert non tunl cvc over 5 yrs (5/27/2022). FHX: family history includes Cancer in her mother; Diabetes in her mother; Hypertension in her mother. SHX:  reports that she has never smoked. She has never used smokeless tobacco. She reports previous alcohol use. She reports that she does not use drugs.     ALL: No Known Allergies     MEDS:   [x] Reviewed - As Below   [] Not reviewed    Current Facility-Administered Medications   Medication    [START ON 6/14/2022] VANCOMYCIN RANDOM LAB REMINDER    vancomycin (VANCOCIN) 500 mg in 0.9% sodium chloride (MBP/ADV) 100 mL MBP    calcium polycarbophiL (FIBERCON) tablet 625 mg    ferrous sulfate tablet 325 mg    furosemide (LASIX) injection 40 mg    glycopyrrolate (ROBINUL) injection 0.1 mg    metoprolol tartrate (LOPRESSOR) tablet 12.5 mg    alteplase (CATHFLO) injection 2 mg    fluconazole (DIFLUCAN) 200mg/100 mL IVPB (premix)    aspirin chewable tablet 81 mg    sodium chloride (NS) flush 5-40 mL    sodium chloride (NS) flush 5-40 mL    ondansetron (ZOFRAN) injection 4 mg    enoxaparin (LOVENOX) injection 40 mg    insulin lispro (HUMALOG) injection    glucose chewable tablet 16 g    glucagon (GLUCAGEN) injection 1 mg    hydrALAZINE (APRESOLINE) tablet 12.5 mg    sacubitriL-valsartan (ENTRESTO) 24-26 mg tablet 1 Tablet    ampicillin-sulbactam (UNASYN) 3 g in 0.9% sodium chloride (MBP/ADV) 100 mL MBP    dextrose 10% infusion 0-250 mL    insulin glargine (LANTUS) injection 10 Units    0.9% sodium chloride infusion 250 mL    Vancomycin Pharmacy Dosing    acetaminophen (TYLENOL) tablet 650 mg    Or    acetaminophen (TYLENOL) suppository 650 mg    polyethylene glycol (MIRALAX) packet 17 g    ondansetron (ZOFRAN ODT) tablet 4 mg    famotidine (PEPCID) tablet 20 mg    acidophilus-pectin, citrus tablet 2 Tablet    albuterol-ipratropium (DUO-NEB) 2.5 MG-0.5 MG/3 ML    artificial saliva (MOUTH KOTE) 1 Spray    brimonidine (ALPHAGAN) 0.2 % ophthalmic solution 1 Drop    cholestyramine-aspartame (QUESTRAN LIGHT) packet 4 g    [Held by provider] fenofibrate nanocrystallized (TRICOR) tablet 48 mg    gabapentin (NEURONTIN) capsule 300 mg    insulin glargine (LANTUS) injection 50 Units    latanoprost (XALATAN) 0.005 % ophthalmic solution 1 Drop    traMADoL (ULTRAM) tablet 25 mg      MAR reviewed and pertinent medications noted or modified as needed   Current Facility-Administered Medications   Medication    [START ON 6/14/2022] VANCOMYCIN RANDOM LAB REMINDER    vancomycin (VANCOCIN) 500 mg in 0.9% sodium chloride (MBP/ADV) 100 mL MBP    calcium polycarbophiL (FIBERCON) tablet 625 mg    ferrous sulfate tablet 325 mg    furosemide (LASIX) injection 40 mg    glycopyrrolate (ROBINUL) injection 0.1 mg    metoprolol tartrate (LOPRESSOR) tablet 12.5 mg    alteplase (CATHFLO) injection 2 mg    fluconazole (DIFLUCAN) 200mg/100 mL IVPB (premix)    aspirin chewable tablet 81 mg    sodium chloride (NS) flush 5-40 mL    sodium chloride (NS) flush 5-40 mL    ondansetron (ZOFRAN) injection 4 mg    enoxaparin (LOVENOX) injection 40 mg    insulin lispro (HUMALOG) injection    glucose chewable tablet 16 g    glucagon (GLUCAGEN) injection 1 mg    hydrALAZINE (APRESOLINE) tablet 12.5 mg    sacubitriL-valsartan (ENTRESTO) 24-26 mg tablet 1 Tablet    ampicillin-sulbactam (UNASYN) 3 g in 0.9% sodium chloride (MBP/ADV) 100 mL MBP    dextrose 10% infusion 0-250 mL    insulin glargine (LANTUS) injection 10 Units    0.9% sodium chloride infusion 250 mL    Vancomycin Pharmacy Dosing    acetaminophen (TYLENOL) tablet 650 mg    Or    acetaminophen (TYLENOL) suppository 650 mg    polyethylene glycol (MIRALAX) packet 17 g    ondansetron (ZOFRAN ODT) tablet 4 mg    famotidine (PEPCID) tablet 20 mg    acidophilus-pectin, citrus tablet 2 Tablet    albuterol-ipratropium (DUO-NEB) 2.5 MG-0.5 MG/3 ML    artificial saliva (MOUTH KOTE) 1 Spray    brimonidine (ALPHAGAN) 0.2 % ophthalmic solution 1 Drop    cholestyramine-aspartame (QUESTRAN LIGHT) packet 4 g    [Held by provider] fenofibrate nanocrystallized (TRICOR) tablet 48 mg    gabapentin (NEURONTIN) capsule 300 mg    insulin glargine (LANTUS) injection 50 Units    latanoprost (XALATAN) 0.005 % ophthalmic solution 1 Drop    traMADoL (ULTRAM) tablet 25 mg      PMH:  has a past medical history of Anemia, Chronic kidney disease, Diabetes mellitus ([address] Hemiplegia affecting dominant side, post-stroke ([address] (05/04/2022), HTN (hypertension), Hyperkalemia, Hyperlipidemia, Ill-defined condition, Neuropathy, PAD (peripheral artery disease) ([address] Sciatica, Stroke ([address] and UTI (urinary tract infection). PSH:   has a past surgical history that includes hx other surgical (Bilateral); hx amputation (Right); hx other surgical; hx cervical fusion; hx vascular stent (Bilateral); hx vascular stent (Bilateral); hx gi; and ir insert non tunl cvc over 5 yrs (5/27/2022). FHX: family history includes Cancer in her mother; Diabetes in her mother; Hypertension in her mother. SHX:  reports that she has never smoked. She has never used smokeless tobacco. She reports previous alcohol use. She reports that she does not use drugs. ROS:  Unable to obtain intubated on ventilator    Hemodynamics:    CO:    CI:    CVP:    SVR:   PAP Systolic:    PAP Diastolic:    PVR:    NT09:        Ventilator Settings:      Mode Rate TV Press PEEP FiO2 PIP Min. Vent   Pressure support,Spontaneous    500 ml 15 cm H2O  5 cm H20 28 %  27 cm H2O  13.2 l/min        Vital Signs: Telemetry:    normal sinus rhythm Intake/Output:   Visit Vitals  BP (!) 103/42 (BP 1 Location: Right upper arm, BP Patient Position:  At rest)   Pulse 100   Temp (!) 102.7 °F (39.3 °C)   Resp 20   Ht 5' 6.93\" (1.7 m)   Wt 95.1 kg (209 lb 10.5 oz)   SpO2 99%   Breastfeeding No   BMI 32.91 kg/m²       Temp (24hrs), Av.7 °F (38.2 °C), Min:99.4 °F (37.4 °C), Max:102.7 °F (39.3 °C)        O2 Device: Nasal cannula O2 Flow Rate (L/min): 2 l/min       Wt Readings from Last 4 Encounters:   22 95.1 kg (209 lb 10.5 oz)   04/10/22 86.6 kg (191 lb)   22 82.2 kg (181 lb 3.5 oz)   20 84.4 kg (186 lb)          Intake/Output Summary (Last 24 hours) at 2022 0957  Last data filed at 2022 0646  Gross per 24 hour   Intake 1000 ml   Output 1200 ml   Net -200 ml       Last shift:      No intake/output data recorded. Last 3 shifts:  1901 -  0700  In: 2460 [I.V.:120]  Out: 2750 [Urine:2500; Drains:250]       Physical Exam:     General: On noninvasive ventilator weakly tries to open her eyes follows no commands  HEENT: NCAT,  Eyes: anicteric; conjunctiva clear, random eye movement when eyelids are open  Neck: no nodes, , trach midline; no accessory MM use. Neck veins obscured by obesity but seems slightly engorged  Chest: no deformity,   Cardiac: R regular; no murmur;   Lungs: Shallow breaths with diffuse wheezes and rhonchi  Abd: soft, NT, hypoactive BS  Ext: Diffuse edema; no joint swelling;  No clubbing  :clear urine  Neuro: Seems to weakly try to open her eyes follows no commands does not speak does not move her extremities  Psych-  unable to assess  Skin: warm, dry, no cyanosis;   Pulses: Brachial and radial pulses intact  Capillary: Normal capillary refill      DATA:    MAR reviewed and pertinent medications noted or modified as needed  MEDS:   Current Facility-Administered Medications   Medication    [START ON 2022] VANCOMYCIN RANDOM LAB REMINDER    vancomycin (VANCOCIN) 500 mg in 0.9% sodium chloride (MBP/ADV) 100 mL MBP    calcium polycarbophiL (FIBERCON) tablet 625 mg    ferrous sulfate tablet 325 mg    furosemide (LASIX) injection 40 mg    glycopyrrolate (ROBINUL) injection 0.1 mg    metoprolol tartrate (LOPRESSOR) tablet 12.5 mg    alteplase (CATHFLO) injection 2 mg    fluconazole (DIFLUCAN) 200mg/100 mL IVPB (premix)    aspirin chewable tablet 81 mg    sodium chloride (NS) flush 5-40 mL    sodium chloride (NS) flush 5-40 mL    ondansetron (ZOFRAN) injection 4 mg    enoxaparin (LOVENOX) injection 40 mg    insulin lispro (HUMALOG) injection    glucose chewable tablet 16 g    glucagon (GLUCAGEN) injection 1 mg    hydrALAZINE (APRESOLINE) tablet 12.5 mg    sacubitriL-valsartan (ENTRESTO) 24-26 mg tablet 1 Tablet    ampicillin-sulbactam (UNASYN) 3 g in 0.9% sodium chloride (MBP/ADV) 100 mL MBP    dextrose 10% infusion 0-250 mL    insulin glargine (LANTUS) injection 10 Units    0.9% sodium chloride infusion 250 mL    Vancomycin Pharmacy Dosing    acetaminophen (TYLENOL) tablet 650 mg    Or    acetaminophen (TYLENOL) suppository 650 mg    polyethylene glycol (MIRALAX) packet 17 g    ondansetron (ZOFRAN ODT) tablet 4 mg    famotidine (PEPCID) tablet 20 mg    acidophilus-pectin, citrus tablet 2 Tablet    albuterol-ipratropium (DUO-NEB) 2.5 MG-0.5 MG/3 ML    artificial saliva (MOUTH KOTE) 1 Spray    brimonidine (ALPHAGAN) 0.2 % ophthalmic solution 1 Drop    cholestyramine-aspartame (QUESTRAN LIGHT) packet 4 g    [Held by provider] fenofibrate nanocrystallized (TRICOR) tablet 48 mg    gabapentin (NEURONTIN) capsule 300 mg    insulin glargine (LANTUS) injection 50 Units    latanoprost (XALATAN) 0.005 % ophthalmic solution 1 Drop    traMADoL (ULTRAM) tablet 25 mg        Labs:    Recent Labs     06/13/22  0823 06/12/22  0712 06/11/22  0430   WBC 15.1* 13.1* 13.8*   HGB 9.4* 10.1* 8.5*    335 284   INR 1.6* 1.5*  --      Recent Labs     06/13/22  0823 06/12/22  1603 06/11/22  1400 06/11/22  0430   NA  --   --   --  148*   K  --   --   --  3.8   CL  --   --   --  117*   CO2  --   --   -- 24   GLU  --   --   --  306*   BUN  --   --   --  44*   CREA  --  0.83 0.72 0.91   CA  --   --   --  9.1   MG 2.2  --   --   --    PHOS 3.0  --   --   --    ALB  --   --   --  2.0*   ALT  --   --   --  55     No results for input(s): PH, PCO2, PO2, HCO3, FIO2 in the last 72 hours. 6/5 and IV IP 16 EP 5 rate 16 FiO2 30%    AC 12  PEEP 5 FiO2 30%   echo ejection fraction 20% mild mitral regurg left atrium 3.5 cm RVSP 35  , 1313, 1361    Lab Results   Component Value Date/Time    Culture result: Heavy  Mixed skin yasmine isolated   2022 06:15 PM    Culture result: Rare Normal respiratory yasmine 2022 02:48 PM    Culture result: Heavy Acinetobacter baumannii complex 05/15/2022 07:15 PM    Culture result: Heavy Diphtheroids 05/15/2022 07:15 PM     Lab Results   Component Value Date/Time    TSH 2.880 2016 10:13 AM        Imagin/22 chest x-ray with ET tube in place hazy accentuated basilar markings with small amount of fluid in the minor fissure  Results from Hospital Encounter encounter on 22    XR CHEST PORT    Narrative  Chest single view. Comparison single view chest 2022. Removal right CVC. Patchy reticular markings same distribution through lungs. Cardiac and  mediastinal structures unchanged noting thoracic aorta atherosclerosis. No  pneumothorax or pleural effusion. Cervical hardware. Results from [place] encounter on 22    CT ABD PELV WO CONT    Narrative  CT ABD PELV WO CONT    Technique: Noncontrasted CT scan of the abdomen and pelvis was performed  utilizing transaxial images obtained from the dome of the diaphragm to the pubic  symphysis. Coronal and sagittal reconstructions were generated.     Dose Reduction:  All CT scans at this facility are performed using dose reduction optimization  techniques as appropriate to a performed exam including the following: Automated  exposure control, adjustments of the mA and/or kV according to patient size, or  use of iterative reconstruction technique. Comparison:  2/25/2022. Findings:  Small-moderate pleural effusions are present with bibasilar  atelectasis. Atherosclerotic calcifications are again evident including coronary  artery calcifications. Gastrostomy tube tip in the gastric antrum. The liver, spleen, pancreas, and  adrenal glands are unremarkable for noncontrasted technique. Stable lobulated  kidneys. Negative for hydronephrosis. Gallbladder is unremarkable. No biliary  duct dilatation apparent. The abdominal aorta is normal in caliber. There is no evidence of bowel obstruction. Small volume ascites has developed. The urinary bladder is decompressed by a Bell catheter. The uterus is  unremarkable. The appendix is normal.    There is mild body wall edema. No suspicious lytic or blastic lesion evident. Impression  Third spacing of fluids with small volume ascites, small-moderate  pleural effusions, and mild body wall edema. Bibasilar atelectasis. 5/20 intubated on ventilator we will continue current vent settings patient is supposed to go for surgery because of transaminitis elevated liver enzyme we will wait as per surgeon for sacral wound debridement and diverting loop colostomy off Levophed, improvement in AST and ALT  5/21 continue with the current vent settings ABG acceptable liver enzyme improving awaiting for the surgery  5/22 maintain ventilator as is in anticipation of surgery.   Renal function improved mild congestion on chest x-ray we will give 1 dose Lasix and potassium  5/23 patient is going for surgery today we will leave ventilator as it is INR is 1.5  5/25 patient remained on ventilator intubated doing well, patient received vitamin K fresh frozen plasma schedule for laparoscopic loop colostomy possible today  5/26 no surgery has been plans will start weaning do sedation vacation  5/27 try to do sedation vacation to wean patient blood pressure dropped still on Levophed we will continue to wean discussed with the family at bedside  5/28 remains on ventilator sedated and also on Levophed possible surgery next week  5/29 on ventilator ET tube advisement 1 to 1.5 cm we will repeat chest x-ray ABG acceptable  5/30 continue with current vent setting  5/31 awaiting for possible surgery if not we will start weaning  6/1 remain intubated on ventilator surgery has been delayed we will start weaning and plan for extubation  6/2 no surgery was planned so we will try to wean she is off Levophed we will do sedation vacation and if weaning cardio acceptable will extubate  6/3 change vent settings to spontaneous if weaning creatinine acceptable will extubate  6/4 extubated on 6/3 now on nasal cannula tolerating well  6/5 respiratory distress last night placed on noninvasive ventilator chest x-ray continues to show fluid overload. Will increase Lasix to 40 mg twice daily give albumin today. Likely has continued aspiration. Will increase Robinul to every 6 hours dosing  6/6 patient is alert awake he is full code we will try to wean BiPAP put on nasal cannula and keep BiPAP at night  6/7 noninvasive ventilator Machine we will change it to high flow nasal cannula  6/8 on noninvasive ventilator BiPAP machine chest x-ray shows improvement overall condition prognosis poor  6/10 transition from BiPAP to nasal cannula.   3 L ABG acceptable

## 2022-06-13 NOTE — PROGRESS NOTES
Progress Note    Patient: Sammie Burgess MRN: [id number]  SSN: [id number]    YOB: 1945  Age: 78 y.o. Sex: female      Admit Date: 5/14/2022    LOS: 30 days     Subjective:   Patient followed for sacral wound infection with repeat culture growing Acinetobacter and Enterococci. Left foot wound also grew Acinetobacter, now status post left TMA. Today patient is febrile to 102.8 with increasing WBC and CRP. She remains on IV Vancomycin and Unasyn. Patient is awake but nonverbal.  Daughter at bedside. Patient is on cooling blanket at this time. Wound vac changed earlier today with concern for tunneling. Dr. Darrick Salazar was notified by [facility] [employer]. Videology photos reviewed. Objective:     Vitals:    06/13/22 1108 06/13/22 1200 06/13/22 1223 06/13/22 1301   BP:       Pulse:  84     Resp:       Temp:   (!) 102.5 °F (39.2 °C) (!) 102.8 °F (39.3 °C)   SpO2: 99%      Weight:       Height:            Intake and Output:  Current Shift: No intake/output data recorded. Last three shifts: 06/11 1901 - 06/13 0700  In: 2460 [I.V.:120]  Out: 2750 [Urine:2500; Drains:250]    Physical Exam:    Vitals and nursing note reviewed. Constitutional:       General: She is not in acute distress. Appearance: She is ill-appearing. HENT: BIPAP    Eyes: open        Cardiovascular:      Rate and Rhythm: Normal rate and regular rhythm. Heart sounds: No murmur heard. Pulmonary: clear bilaterally    Abdominal:      General: Bowel sounds are normal.      Palpations: Abdomen is soft. Tenderness: There is no abdominal tenderness. Comments: PEG site unremarkable today except mild exudate    Genitourinary:     Comments: Indwelling Bell with clear urine         Musculoskeletal:      Cervical back: Neck supple. Right lower leg: No edema. Left lower leg: No edema. No     Comments: Left foot surgical wound incision intact with no drainage  Skin:     Findings: No rash.              Comments: Sacral wound with normal granulation tissue peripherally but central slough  Neurological: nonverbal     Lab/Data Review:     WBC 15,100    ALT/AST 55 /14  Urinalysis with WBC >100, Yeasts, Bacteria negative    Procal 0.67 <0.67 < 0.84 <0.64 <0.31 <0.26 <0.24 <0.31 <0.36  <0.59 <0.57  CRP 13.00 <10.90 <14.20 < 13.60 < 14.60 <11.40 <12.60  <18.90 <17.90    Blood cultures (5/14) Coagulase negative Staphylococci  Sacral wound (5/14) Acinetobacter baumannii sensitive to Unasyn, Cefepime, Ceftazidime and Enterococcus faecium resistant to Ampicillin  Sacral wound (5/23) Mixed skin yasmine FINAL  Left great toe (5/15) Acinetobacter baumannii  Sputum culture (5/19) Rare normal respiratory yasmine FINAL     CXR (6/10)  Patchy reticular markings same distribution through lungs. Cardiac and mediastinal structures unchanged noting thoracic aorta atherosclerosis. No pneumothorax or pleural effusion. Assessment:     Active Problems:    Sacral ulcer ([address] (5/14/2022)    1. Sacral wound infection secondary now to Acinetobacter baumannii and Enterococcus faecium, on Vancomycin and Unasyn, status post excisional wound debridement to the bone, Day #24 Vancomycin and Day #21 Unasyn. 2. Sepsis with recurrent fever, worsening leukocytosis, increasing CRP. 3. Left foot wound, culture growing Acinetobacter baumannii, status post TMA  4. Possible ischemic hepatitiis with transaminitis following hypotensive episode, resolving  5. Positive blood cultures for Coagulase negative Staphylococci, probable contaminant  6. Hepatitis C antibody positive, resolved (negative HCV viral level)  7. New onset cardiomyopathy  8. Candida UTI with marked pyuria and yeasts, Day #13 IV Fluconazole  9. ? Hospice planned    Comment:  New fever and increasing inflammatory markers warrant re-interrogation for infection. No obvious new infection, though quite a bit of slough on sacral wound with possible tunnelling. Plan:   1. Blood cultures, urine cultures, CXR  2. Continue IV Vancomycin  3. Discontinue Unasyn  4. Start IV Avycaz (Acinetobacter resistant to Zosyn and Meropenem)  5. Continue Fluconazole 200 mg IV daily for 1 more day  6. In am, repeat CBC, procal and CRP   7.  Repeat sacral wound culture at next dressing or vac change    Signed By: Geoff Vasquez MD     June 13, 2022

## 2022-06-14 NOTE — PROGRESS NOTES
Progress Note    Patient: Raissa Husain MRN: [id number]  SSN: [id number]    YOB: 1945  Age: 78 y.o. Sex: female      Admit Date: 5/14/2022    LOS: 31 days     Subjective:   Patient followed for sacral wound infection with repeat culture growing Acinetobacter and Enterococci. Left foot wound also grew Acinetobacter, now status post left TMA. Her temperature has trended downward today along with slight decrease in WBC. Still with pyuria and budding yeasts. Family has made decision to transfer to hospice care on Thursday. Family members at bedside. Objective:     Vitals:    06/14/22 0740 06/14/22 0745 06/14/22 1455 06/14/22 1520   BP:   (!) 120/44 (!) 133/55   Pulse:   76    Resp:   18    Temp:   99 °F (37.2 °C)    SpO2: 98% 98%     Weight:       Height:            Intake and Output:  Current Shift: 06/14 0701 - 06/14 1900  In: 1320   Out: 1800 [UVOOT:7951]  Last three shifts: 06/12 1901 - 06/14 0700  In: 3280 [I.V.:700]  Out: 1500 [Urine:1500]    Physical Exam:    Vitals and nursing note reviewed. Constitutional:       General: She is not in acute distress. Appearance: She is ill-appearing. HENT: Nasal cannula O2 2L/min    Eyes: open        Cardiovascular:      Rate and Rhythm: Normal rate and regular rhythm. Heart sounds: No murmur heard. Pulmonary: clear bilaterally    Abdominal:      General: Bowel sounds are normal.      Palpations: Abdomen is soft. Tenderness: There is no abdominal tenderness. Comments: PEG site unremarkable today except mild exudate    Genitourinary:     Comments: Indwelling Bell with clear urine         Musculoskeletal:      Cervical back: Neck supple. Right lower leg: No edema. Left lower leg: No edema. No     Comments: Left foot surgical wound incision intact with no drainage  Skin:     Findings: No rash.              Comments: Sacral wound with normal granulation tissue peripherally but central slough  Neurological: nonverbal     Lab/Data Review:     WBC 14,800    ALT/AST 55 /14  Urinalysis with WBC 20-50, Budding yeasts    Procal 0.59 <0.67 <0.67 < 0.84 <0.64 <0.31 <0.26 <0.24 <0.31 <0.36  <0.59 <0.57  CRP 13.00 <10.90 <14.20 < 13.60 < 14.60 <11.40 <12.60  <18.90 <17.90    Blood cultures (5/14) Coagulase negative Staphylococci  Sacral wound (5/14) Acinetobacter baumannii sensitive to Unasyn, Cefepime, Ceftazidime and Enterococcus faecium resistant to Ampicillin  Sacral wound (5/23) Mixed skin yasmine FINAL  Left great toe (5/15) Acinetobacter baumannii  Sputum culture (5/19) Rare normal respiratory yasmine FINAL       CXR (6/13) Shallow depth of inspiration with improving interstitial markings but new  area of atelectasis or infiltrate in the right upper lobe adjacent to the minor fissure  Assessment:     Active Problems:    Sacral ulcer ([address] (5/14/2022)    1. Sacral wound infection secondary now to Acinetobacter baumannii and Enterococcus faecium, on Vancomycin and Unasyn, status post excisional wound debridement to the bone, Day #25 Vancomycin and Day #2 Avycaz  2. Sepsis with recurrent fever, worsening leukocytosis, increasing CRP. 3. Left foot wound, culture growing Acinetobacter baumannii, status post TMA  4. Possible ischemic hepatitiis with transaminitis following hypotensive episode, resolving  5. Positive blood cultures for Coagulase negative Staphylococci, probable contaminant  6. Hepatitis C antibody positive, resolved (negative HCV viral level)  7. New onset cardiomyopathy  8. Refractory Candida UTI with persistent pyuria and yeasts, Day #14 IV Fluconazole  9. ? Hospice planned    Comment:  Unclear if persistent Candida UTI is responsible for her new fever or resistant Gram negative rods in her sacral wound. Plans are for hospice transition on Thursday. Will continue aggressive antibiotic therapy until then. Plan:   1. Continue  IV Vancomycin and Avycaz  (Acinetobacter resistant to Zosyn and Meropenem)  2. Start Anidulafungin for persistent Candiduria (protect blood stream)   Avycaz    Signed By: Jennifer Rodarte MD     June 14, 2022

## 2022-06-14 NOTE — PROGRESS NOTES
Nutrition Assessment     Type and Reason for Visit: Reassess (goal)    Nutrition Recommendations/Plan:     1. Continue TF via PEG of Glucerna 1.5 to 240ml BOLUS feeds, q4hrs (6x/d) w/ water flush with 75ml water after each bolus        Provide 2 pkt Doug Daily (180kcals, 5g pro)     Provides 2340 kcal (100%), 124g PRO (100%), 1544 mL H2O (97%)        Monitor and Record wts, TF/water rates, OPs & BMs in I/Os     Nutrition Assessment:  Admitted for sacral PI. Pending Placement. (4/21) I/D. (5/19) Intubated. (5/23) Repeat I&D, L TMA--Wound vacs placed. Plan for diverting colostomy however family now declined d/t high leah-operative risk. (6/3) extubated after >2wks on vent. (5/15) TF initiated, intermittently held for procedures, pressors, adjusted during intubation. (6/3) Adjusted back to Glucerna at last assessment, current regimen appropriate s/p extube. Will start wound healing supplement. (6/7): requiring BiPaP and propofol, tolerating TF at goal rate w/o complications. (6/14) switched to bolus feeds as requested by patients family 2/2 episodes of diarrhea. Noted tolerating bolus feeds (x6/day) @ 886IQ w/o complication. Labs: NA (154), glucose (279), BUN (35). Lab: PPI, fesu,questran light  lantus, huamlog. Malnutrition Assessment:  Malnutrition Status: Mild malnutrition     Estimated Daily Nutrient Needs:  Energy (kcal):  2000-2400kcals (25-30kcals/kg EDW bedbound vs wounds)  Protein (g):  120-136 (1.5-1.7g/kg)       Fluid (ml/day):  1600-2000ml (20-25ml/kg)    Nutrition Related Findings:  NFPE finding no nutrition-related deficits, however difficult to tell through edema and adiposity. No reported n/v/c/d, non-pitting edema all extremities. Peg placed- min op (<10ml). Last BM, large, formed (6/11). Current Nutrition Therapies:  DIET NPO  ADULT ORAL NUTRITION SUPPLEMENT Lunch; Wound Healing Supplement  ADULT TUBE FEEDING PEG; Diabetic; Delivery Method: Bolus; Bolus Frequency: 6 Times Daily;  Bolus Volume (mL): 240; Bolus Method of Infusion: Syringe Push; Water Flush Volume (mL): 200; Water Flush Frequency: Q 4 hours; Modulars/Additives: Wound Hea. .. Anthropometric Measures:  Height:  5' 6.93\" (170 cm)  Current Body Wt:  94.8 kg (209 lb)  BMI: 32.8    Nutrition Diagnosis:   · Inadequate oral intake related to cognitive or neurological impairment,biting/chewing (masticatory) difficulty,swallowing difficulty (CVA) as evidenced by nutrition support-enteral nutrition      Nutrition Interventions:   Food and/or Nutrient Delivery: Start oral nutrition supplement,Continue oral nutrition supplement  Nutrition Education/Counseling: No recommendations at this time  Coordination of Nutrition Care: Continue to monitor while inpatient  Plan of Care discussed with: RN    Goals:  Previous Goal Met: Goal(s) achieved  Goals: Meet at least 75% of estimated needs,Tolerate nutrition support at goal rate,within 7 days  Specify Other Goals: Maintain BGs <180 mg/dL.  Maintain CBW within +/- 0.5 kg    Nutrition Monitoring and Evaluation:   Behavioral-Environmental Outcomes: None identified  Food/Nutrient Intake Outcomes: Enteral nutrition intake/tolerance,Supplement intake  Physical Signs/Symptoms Outcomes: Biochemical data,Fluid status or edema,Skin,Weight    Discharge Planning:    Enteral nutrition    Himanshu Rodriguez  Contact: [phone]

## 2022-06-14 NOTE — PROGRESS NOTES
Vancomycin Dosing Consult  Day #30 of vancomycin therapy  Consult ordered by Dr. Dionne Obregon for this 78 y.o. female for indication of sacral wound infection. Antibiotic regimen: Vancomycin + Avycaz + Eraxis    Temp (24hrs), Av.7 °F (37.6 °C), Min:99 °F (37.2 °C), Max:101.1 °F (38.4 °C)    Recent Labs     22  1543 22  0547 22  0823 22  1603 22  0712   WBC  --  14.8* 15.1*  --  13.1*   CREA 0.89 0.91 0.90   < >  --    BUN  --  35* 34*  --   --     < > = values in this interval not displayed. Est CrCl: ~65 mL/min  Concomitant nephrotoxic drugs: None    Cultures:    Wound: Moderate MDR Pseudomonas aeruginosa (susceptible to Zerbaxa, aminoglycosides), moderate mixed skin yasmine, moderate mixed enteric yasmine including yeast, final   Blood: CoNS in the anaerobic bottle, final   Wound, ulcer:  Moderate Acinetobacter baumannii, moderate Enterococcus faecium, light >2 organisms (contaminated), final  5/15 Wound, great toe: Heavy Acinetobacter baumannii complex (Zosyn resistant, susceptible to Unasyn), heavy Diphtheroids, final   Tissue: Heavy mixed skin yasmine, final   Blood: NGTD, preliminary   Urine: Pending    MRSA Swab: Not ordered, patient already received first dose of vancomycin    Target range: Trough 15-20 mcg/mL    Recent level history:  Date/Time Dose & Interval Measured Level (mcg/mL)    @ 1543 500 mg q24h 16.4        Assessment/Plan:   · Febrile yesterday, continued WBC, procal, and CRP elevations  · Clearance remains slow relative to renal function and levels map poorly to predictions from [address]; will use traditional trough based dosing with goal 15-20 mcg/mL for now  · Level within therapeutic range, will continue current regimen and check a trough prior to dose  @ 1600  · Antimicrobial stop date TBD

## 2022-06-14 NOTE — PROGRESS NOTES
Writer spoke with daughter Ady Torres via phone. The family has decided to have a hospice consul with [facility]. Tommas Common with [facility] on the unit and informed. Referral placed for hospice. Ady Torres says she will be her at the bedside by 1230p.      [address], [address] [facility]

## 2022-06-14 NOTE — PROGRESS NOTES
IMPRESSION:   1. Acute hypoxic respiratory failure extubated patient is now on 2 L nasal cannula  2. Pulmonary edema resolved  3. Aspiration chronically  4. Sacral decubiti ulcer with Acinetobacter and Enterococcus  5. Severe sepsis   6. Left foot wound  7. Candidal UTI  8. Shock liver resolved  9. Hypokalemia repleted  10. Symptomatic anemia stable after transfusion  11. Cardiomyopathy ejection fraction 50 to 55%      RECOMMENDATIONS/PLAN:     1. Extubated on 6/3 on nasal canula  2. Continue with Lasix  3. Drop in hemoglobin will continue to follow  4. New onset cardiomyopathy with the Lasix chest x-ray shows improvement in the pleural effusion  5. Persistent infection on Unasyn and vancomycin  6. Chest x-ray shows worsening of the congestion  7. Patient underwent on 5/23 left metatarsal amputation and sacral wound debridement  8. Severe sepsis with decubiti ulcer   9. Patient is on Unasyn  10. Chest x-ray shows mild congestive changes with fluid in the minor fissure     [x] High complexity decision making was performed  [x] See my orders for details  HPI   72-year-old lady came in because of leg pain past medical history of hypertension diabetes mellitus peripheral vascular disease CVA she is bedbound she has leg wound and also has sacral decubiti ulcer and she was scheduled for laparoscopic colostomy and sacral wound debridement and amputation of the left tarsometatarsal because of wound infection but her condition got worse her liver enzyme was elevated INR was also elevated she was intubated transferred to ICU was getting vancomycin and Unasyn started on Levophed because of low blood pressure.     PMH:  has a past medical history of Anemia, Chronic kidney disease, Diabetes mellitus ([address] Hemiplegia affecting dominant side, post-stroke ([address] (05/04/2022), HTN (hypertension), Hyperkalemia, Hyperlipidemia, Ill-defined condition, Neuropathy, PAD (peripheral artery disease) ([address] Sciatica, Stroke ([address] and UTI (urinary tract infection). PSH:   has a past surgical history that includes hx other surgical (Bilateral); hx amputation (Right); hx other surgical; hx cervical fusion; hx vascular stent (Bilateral); hx vascular stent (Bilateral); hx gi; and ir insert non tunl cvc over 5 yrs (5/27/2022). FHX: family history includes Cancer in her mother; Diabetes in her mother; Hypertension in her mother. SHX:  reports that she has never smoked. She has never used smokeless tobacco. She reports previous alcohol use. She reports that she does not use drugs.     ALL: No Known Allergies     MEDS:   [x] Reviewed - As Below   [] Not reviewed    Current Facility-Administered Medications   Medication    cefTAZidime-avibactam (AVYCAZ) 2.5 g in 0.9% sodium chloride 100 mL ([id number])    scopolamine (TRANSDERM-SCOP) 1 mg over 3 days 1 Patch    VANCOMYCIN RANDOM LAB REMINDER    vancomycin (VANCOCIN) 500 mg in 0.9% sodium chloride (MBP/ADV) 100 mL MBP    calcium polycarbophiL (FIBERCON) tablet 625 mg    ferrous sulfate tablet 325 mg    furosemide (LASIX) injection 40 mg    metoprolol tartrate (LOPRESSOR) tablet 12.5 mg    alteplase (CATHFLO) injection 2 mg    fluconazole (DIFLUCAN) 200mg/100 mL IVPB (premix)    aspirin chewable tablet 81 mg    sodium chloride (NS) flush 5-40 mL    sodium chloride (NS) flush 5-40 mL    ondansetron (ZOFRAN) injection 4 mg    enoxaparin (LOVENOX) injection 40 mg    insulin lispro (HUMALOG) injection    glucose chewable tablet 16 g    glucagon (GLUCAGEN) injection 1 mg    hydrALAZINE (APRESOLINE) tablet 12.5 mg    sacubitriL-valsartan (ENTRESTO) 24-26 mg tablet 1 Tablet    dextrose 10% infusion 0-250 mL    insulin glargine (LANTUS) injection 10 Units    0.9% sodium chloride infusion 250 mL    Vancomycin Pharmacy Dosing    acetaminophen (TYLENOL) tablet 650 mg    Or    acetaminophen (TYLENOL) suppository 650 mg    polyethylene glycol (MIRALAX) packet 17 g    ondansetron (ZOFRAN ODT) tablet 4 mg    famotidine (PEPCID) tablet 20 mg    acidophilus-pectin, citrus tablet 2 Tablet    albuterol-ipratropium (DUO-NEB) 2.5 MG-0.5 MG/3 ML    artificial saliva (MOUTH KOTE) 1 Spray    brimonidine (ALPHAGAN) 0.2 % ophthalmic solution 1 Drop    cholestyramine-aspartame (QUESTRAN LIGHT) packet 4 g    [Held by provider] fenofibrate nanocrystallized (TRICOR) tablet 48 mg    gabapentin (NEURONTIN) capsule 300 mg    insulin glargine (LANTUS) injection 50 Units    latanoprost (XALATAN) 0.005 % ophthalmic solution 1 Drop    traMADoL (ULTRAM) tablet 25 mg      MAR reviewed and pertinent medications noted or modified as needed   Current Facility-Administered Medications   Medication    cefTAZidime-avibactam (AVYCAZ) 2.5 g in 0.9% sodium chloride 100 mL ([id number])    scopolamine (TRANSDERM-SCOP) 1 mg over 3 days 1 Patch    VANCOMYCIN RANDOM LAB REMINDER    vancomycin (VANCOCIN) 500 mg in 0.9% sodium chloride (MBP/ADV) 100 mL MBP    calcium polycarbophiL (FIBERCON) tablet 625 mg    ferrous sulfate tablet 325 mg    furosemide (LASIX) injection 40 mg    metoprolol tartrate (LOPRESSOR) tablet 12.5 mg    alteplase (CATHFLO) injection 2 mg    fluconazole (DIFLUCAN) 200mg/100 mL IVPB (premix)    aspirin chewable tablet 81 mg    sodium chloride (NS) flush 5-40 mL    sodium chloride (NS) flush 5-40 mL    ondansetron (ZOFRAN) injection 4 mg    enoxaparin (LOVENOX) injection 40 mg    insulin lispro (HUMALOG) injection    glucose chewable tablet 16 g    glucagon (GLUCAGEN) injection 1 mg    hydrALAZINE (APRESOLINE) tablet 12.5 mg    sacubitriL-valsartan (ENTRESTO) 24-26 mg tablet 1 Tablet    dextrose 10% infusion 0-250 mL    insulin glargine (LANTUS) injection 10 Units    0.9% sodium chloride infusion 250 mL    Vancomycin Pharmacy Dosing    acetaminophen (TYLENOL) tablet 650 mg    Or    acetaminophen (TYLENOL) suppository 650 mg    polyethylene glycol (MIRALAX) packet 17 g    ondansetron (ZOFRAN ODT) tablet 4 mg    famotidine (PEPCID) tablet 20 mg    acidophilus-pectin, citrus tablet 2 Tablet    albuterol-ipratropium (DUO-NEB) 2.5 MG-0.5 MG/3 ML    artificial saliva (MOUTH KOTE) 1 Spray    brimonidine (ALPHAGAN) 0.2 % ophthalmic solution 1 Drop    cholestyramine-aspartame (QUESTRAN LIGHT) packet 4 g    [Held by provider] fenofibrate nanocrystallized (TRICOR) tablet 48 mg    gabapentin (NEURONTIN) capsule 300 mg    insulin glargine (LANTUS) injection 50 Units    latanoprost (XALATAN) 0.005 % ophthalmic solution 1 Drop    traMADoL (ULTRAM) tablet 25 mg      PMH:  has a past medical history of Anemia, Chronic kidney disease, Diabetes mellitus ([address] Hemiplegia affecting dominant side, post-stroke ([address] (05/04/2022), HTN (hypertension), Hyperkalemia, Hyperlipidemia, Ill-defined condition, Neuropathy, PAD (peripheral artery disease) ([address] Sciatica, Stroke ([address] and UTI (urinary tract infection). PSH:   has a past surgical history that includes hx other surgical (Bilateral); hx amputation (Right); hx other surgical; hx cervical fusion; hx vascular stent (Bilateral); hx vascular stent (Bilateral); hx gi; and ir insert non tunl cvc over 5 yrs (5/27/2022). FHX: family history includes Cancer in her mother; Diabetes in her mother; Hypertension in her mother. SHX:  reports that she has never smoked. She has never used smokeless tobacco. She reports previous alcohol use. She reports that she does not use drugs. ROS:  Unable to obtain intubated on ventilator    Hemodynamics:    CO:    CI:    CVP:    SVR:   PAP Systolic:    PAP Diastolic:    PVR:    SP40:        Ventilator Settings:      Mode Rate TV Press PEEP FiO2 PIP Min.  Vent   Pressure support,Spontaneous    500 ml 15 cm H2O  5 cm H20 28 %  27 cm H2O  13.2 l/min        Vital Signs: Telemetry:    normal sinus rhythm Intake/Output:   Visit Vitals  BP (!) 152/64 (BP 1 Location: Right upper arm, BP Patient Position: At rest)   Pulse 80 Temp 99.1 °F (37.3 °C)   Resp 19   Ht 5' 6.93\" (1.7 m)   Wt 95.1 kg (209 lb 10.5 oz)   SpO2 98%   Breastfeeding No   BMI 32.91 kg/m²       Temp (24hrs), Av.8 °F (38.2 °C), Min:99.1 °F (37.3 °C), Max:102.8 °F (39.3 °C)        O2 Device: Nasal cannula O2 Flow Rate (L/min): 2 l/min       Wt Readings from Last 4 Encounters:   22 95.1 kg (209 lb 10.5 oz)   04/10/22 86.6 kg (191 lb)   22 82.2 kg (181 lb 3.5 oz)   20 84.4 kg (186 lb)          Intake/Output Summary (Last 24 hours) at 2022 0952  Last data filed at 2022 0630  Gross per 24 hour   Intake 2220 ml   Output 0 ml   Net 2220 ml       Last shift:      No intake/output data recorded. Last 3 shifts:  1901 -  0700  In: 3280 [I.V.:700]  Out: 1500 [Urine:1500]       Physical Exam:     General: On noninvasive ventilator weakly tries to open her eyes follows no commands  HEENT: NCAT,  Eyes: anicteric; conjunctiva clear, random eye movement when eyelids are open  Neck: no nodes, , trach midline; no accessory MM use. Neck veins obscured by obesity but seems slightly engorged  Chest: no deformity,   Cardiac: R regular; no murmur;   Lungs: Shallow breaths with diffuse wheezes and rhonchi  Abd: soft, NT, hypoactive BS  Ext: Diffuse edema; no joint swelling;  No clubbing  :clear urine  Neuro: Seems to weakly try to open her eyes follows no commands does not speak does not move her extremities  Psych-  unable to assess  Skin: warm, dry, no cyanosis;   Pulses: Brachial and radial pulses intact  Capillary: Normal capillary refill      DATA:    MAR reviewed and pertinent medications noted or modified as needed  MEDS:   Current Facility-Administered Medications   Medication    cefTAZidime-avibactam (AVYCAZ) 2.5 g in 0.9% sodium chloride 100 mL ([id number])    scopolamine (TRANSDERM-SCOP) 1 mg over 3 days 1 Patch    VANCOMYCIN RANDOM LAB REMINDER    vancomycin (VANCOCIN) 500 mg in 0.9% sodium chloride (MBP/ADV) 100 mL MBP    calcium polycarbophiL (FIBERCON) tablet 625 mg    ferrous sulfate tablet 325 mg    furosemide (LASIX) injection 40 mg    metoprolol tartrate (LOPRESSOR) tablet 12.5 mg    alteplase (CATHFLO) injection 2 mg    fluconazole (DIFLUCAN) 200mg/100 mL IVPB (premix)    aspirin chewable tablet 81 mg    sodium chloride (NS) flush 5-40 mL    sodium chloride (NS) flush 5-40 mL    ondansetron (ZOFRAN) injection 4 mg    enoxaparin (LOVENOX) injection 40 mg    insulin lispro (HUMALOG) injection    glucose chewable tablet 16 g    glucagon (GLUCAGEN) injection 1 mg    hydrALAZINE (APRESOLINE) tablet 12.5 mg    sacubitriL-valsartan (ENTRESTO) 24-26 mg tablet 1 Tablet    dextrose 10% infusion 0-250 mL    insulin glargine (LANTUS) injection 10 Units    0.9% sodium chloride infusion 250 mL    Vancomycin Pharmacy Dosing    acetaminophen (TYLENOL) tablet 650 mg    Or    acetaminophen (TYLENOL) suppository 650 mg    polyethylene glycol (MIRALAX) packet 17 g    ondansetron (ZOFRAN ODT) tablet 4 mg    famotidine (PEPCID) tablet 20 mg    acidophilus-pectin, citrus tablet 2 Tablet    albuterol-ipratropium (DUO-NEB) 2.5 MG-0.5 MG/3 ML    artificial saliva (MOUTH KOTE) 1 Spray    brimonidine (ALPHAGAN) 0.2 % ophthalmic solution 1 Drop    cholestyramine-aspartame (QUESTRAN LIGHT) packet 4 g    [Held by provider] fenofibrate nanocrystallized (TRICOR) tablet 48 mg    gabapentin (NEURONTIN) capsule 300 mg    insulin glargine (LANTUS) injection 50 Units    latanoprost (XALATAN) 0.005 % ophthalmic solution 1 Drop    traMADoL (ULTRAM) tablet 25 mg        Labs:    Recent Labs     06/14/22  0547 06/13/22  0823 06/12/22  0712   WBC 14.8* 15.1* 13.1*   HGB 9.0* 9.4* 10.1*    383 335   INR 1.6* 1.6* 1.5*     Recent Labs     06/14/22  0547 06/13/22  0823 06/12/22  1603   * 153*  --    K 3.9 3.6  --    * 122*  --    CO2 27 25  --    * 206*  --    BUN 35* 34*  --    CREA 0.91 0.90 0.83   CA 8.9 9.1  --    MG --  2.2  --    PHOS  --  3.0  --    ALB 1.9* 2.0*  --    ALT 26 32  --      No results for input(s): PH, PCO2, PO2, HCO3, FIO2 in the last 72 hours. 6/5 and IV IP 16 EP 5 rate 16 FiO2 30%    AC 12  PEEP 5 FiO2 30%   echo ejection fraction 20% mild mitral regurg left atrium 3.5 cm RVSP 35  , 1313, 1361    Lab Results   Component Value Date/Time    Culture result: Heavy  Mixed skin yasmine isolated   2022 06:15 PM    Culture result: Rare Normal respiratory yasmine 2022 02:48 PM    Culture result: Heavy Acinetobacter baumannii complex 05/15/2022 07:15 PM    Culture result: Heavy Diphtheroids 05/15/2022 07:15 PM     Lab Results   Component Value Date/Time    TSH 2.880 2016 10:13 AM        Imagin/22 chest x-ray with ET tube in place hazy accentuated basilar markings with small amount of fluid in the minor fissure  Results from Hospital Encounter encounter on 22    XR CHEST PORT    Narrative  Semiupright portable radiograph chest 4:32 p.m. compared to Vivien 10, 2022. INDICATION: New fever. Heart size remains enlarged. Shallow depth of inspiration. There is increasing  airspace disease or atelectasis in the right upper lobe abutting the minor  fissure overall decreasing interstitial markings throughout both lungs. Cannot  exclude a small left pleural effusion. No pneumothorax. No acute bony findings. Impression  1. Shallow depth of inspiration with improving interstitial markings but new  area of atelectasis or infiltrate in the right upper lobe adjacent to the minor  fissure. Results from [place] encounter on 22    CT ABD PELV WO CONT    Narrative  CT ABD PELV WO CONT    Technique: Noncontrasted CT scan of the abdomen and pelvis was performed  utilizing transaxial images obtained from the dome of the diaphragm to the pubic  symphysis. Coronal and sagittal reconstructions were generated.     Dose Reduction:  All CT scans at this facility are performed using dose reduction optimization  techniques as appropriate to a performed exam including the following: Automated  exposure control, adjustments of the mA and/or kV according to patient size, or  use of iterative reconstruction technique. Comparison:  2/25/2022. Findings:  Small-moderate pleural effusions are present with bibasilar  atelectasis. Atherosclerotic calcifications are again evident including coronary  artery calcifications. Gastrostomy tube tip in the gastric antrum. The liver, spleen, pancreas, and  adrenal glands are unremarkable for noncontrasted technique. Stable lobulated  kidneys. Negative for hydronephrosis. Gallbladder is unremarkable. No biliary  duct dilatation apparent. The abdominal aorta is normal in caliber. There is no evidence of bowel obstruction. Small volume ascites has developed. The urinary bladder is decompressed by a Bell catheter. The uterus is  unremarkable. The appendix is normal.    There is mild body wall edema. No suspicious lytic or blastic lesion evident. Impression  Third spacing of fluids with small volume ascites, small-moderate  pleural effusions, and mild body wall edema. Bibasilar atelectasis. 5/20 intubated on ventilator we will continue current vent settings patient is supposed to go for surgery because of transaminitis elevated liver enzyme we will wait as per surgeon for sacral wound debridement and diverting loop colostomy off Levophed, improvement in AST and ALT  5/21 continue with the current vent settings ABG acceptable liver enzyme improving awaiting for the surgery  5/22 maintain ventilator as is in anticipation of surgery.   Renal function improved mild congestion on chest x-ray we will give 1 dose Lasix and potassium  5/23 patient is going for surgery today we will leave ventilator as it is INR is 1.5  5/25 patient remained on ventilator intubated doing well, patient received vitamin K fresh frozen plasma schedule for laparoscopic loop colostomy possible today  5/26 no surgery has been plans will start weaning do sedation vacation  5/27 try to do sedation vacation to wean patient blood pressure dropped still on Levophed we will continue to wean discussed with the family at bedside  5/28 remains on ventilator sedated and also on Levophed possible surgery next week  5/29 on ventilator ET tube advisement 1 to 1.5 cm we will repeat chest x-ray ABG acceptable  5/30 continue with current vent setting  5/31 awaiting for possible surgery if not we will start weaning  6/1 remain intubated on ventilator surgery has been delayed we will start weaning and plan for extubation  6/2 no surgery was planned so we will try to wean she is off Levophed we will do sedation vacation and if weaning cardio acceptable will extubate  6/3 change vent settings to spontaneous if weaning creatinine acceptable will extubate  6/4 extubated on 6/3 now on nasal cannula tolerating well  6/5 respiratory distress last night placed on noninvasive ventilator chest x-ray continues to show fluid overload. Will increase Lasix to 40 mg twice daily give albumin today. Likely has continued aspiration. Will increase Robinul to every 6 hours dosing  6/6 patient is alert awake he is full code we will try to wean BiPAP put on nasal cannula and keep BiPAP at night  6/7 noninvasive ventilator Machine we will change it to high flow nasal cannula  6/8 on noninvasive ventilator BiPAP machine chest x-ray shows improvement overall condition prognosis poor  6/10 transition from BiPAP to nasal cannula.   3 L ABG acceptable

## 2022-06-14 NOTE — PROGRESS NOTES
Bedside shift change report given to FISH Hall (oncoming nurse) by Angelica Barnett (offgoing nurse). Report included the following information SBAR.

## 2022-06-14 NOTE — HOSPICE
[facility]  [phone]     Patient Name: Sammie Burgess  YOB: 1945  Age: 78 y.o. [address] [facility] RN Note:  Hospice consult received, chart reviewed and patient assessed. Met with daughter Daniela Colin [facility]) at bedside and offered hospice information. Daniela Colin openly shared many frustrations she and other family have had surrounding the patients care during this current (and long) hospitalization. She reports all seven children are in agreement for hospice care. She understands GIP means code status would change to DNR and that all medical interventions (TF's, wound vac, abx, etc) would be discontinued. She shared her mom has suffered enough and that they (family) would like to just keep her comfortable. Discussed transfer to [facility] may be necessary in event patient stabilized. For now we will admit Ms. Benz here under GIP. Family has requested transition to hospice to occur on Thursday to allow grand daughter to arrive from [facility]. SHIRLEY and Dr. Hutchison Artist updated. Thank you for the opportunity to be of service to Ms. Vinicio German and her family.     Jo Hagen, FISH  Hospice Clinical Liaison  [phone]

## 2022-06-14 NOTE — PROGRESS NOTES
Patients daughter at bedside educated regarding Contact isolation and specifically wearing gown glove and Mask in room while visiting . Explained patient had a Multi Drug Resistant Organism that is Acinetobacter and can be transmitted via contact. The daughter was thankful in explaining and was compliant and receptive to donning appropriate PPE.

## 2022-06-14 NOTE — PROGRESS NOTES
General Daily Progress Note          Patient Name:   Rossy Ramires       YOB: 1945       Age:  78 y.o. Admit Date: 5/14/2022      Subjective:     78years old female with significant past medical history of CVA with PEG tube chronic kidney disease CVA with right-sided weakness bedbound DVT of the left great toe status post removal of the left great and second toe history of aspiration pneumonia history of sacral decubitus ulcer patient was recently discharged from the hospitalPatient discharged to nursing home upon p.o. Cipro    Admitted again yesterday concerning for worsening of sacral ulcer    During last admission patient was seen by infectious disease and also seen by the vascular surgeon patient had debridement of wound during the last admission    Patient seen by the infectious disease yesterday    Sacral wound infection recent cultures growing Pseudomonas resistant to Zosyn and meropenem    5/17    Patient alert awake not in distress  Patient was seen by the vascular surgeon    Vascular surgery planned for laparoscopic loop colostomy placement and sacral debridement then wound vacuum  Also try to close the wound on the left foot with TMA    5/18    Resting in the bed not in any distress  Blood sugars running high    Seen by infectious disease continue vancomycin and start on Unasyn    5/19    And went to the OR intubated because of elevated LFT and elevated INR  Surgery was canceled    On ventilator 40% O2  Propofol drip    5/20    Patient on ventilator with 30% O2  On propofol drip    Hemoglobin dropped to 6.8    Patient's daughter at the bedside    5/20    Patient on ventilator 30% O2  On propofol drip  Getting PEG tube feeding    5/21    Patient still on ventilator 30% O2  On propofol drip  Liver Function improving    5/22  On ventilator with 30% O2 on propofol drip    5/24  Awaiting tissue culture results.    On ventilator with 30% O2 on propofol drip  Left transmetatarsal amputation and Sacral wound excisional wound debridement 13 x 15 cm to the bone completed yesterday. CXR: Hazy mid and lower lung reticular markings.  Dependent small to moderate volume,  right greater than left pleural effusions  Remarkable labs   WBC: 15.2   Hgb: 8.5   Na; 148  CRP: 13.60   Procal: 0.71      5/25    Patient on ventilator with 30% O2  Seen by the vascular surgeon and plan for surgery today    5/26  Patient on ventilator with 30% O2  Hypotensive on Levophed  On propofol drip    Surgery was canceled yesterday because patient unstable hemodynamically not cleared by the anesthesia    5/27    Patient on ventilator with 30% O2  Hypotensive on Levophed  Getting PEG tube feeding    5/30     Patient on ventilator with 21% O2  On propofol drip  Off pressor    5/31    Patient on ventilator with 21% O2  On propofol drip    Off pressor  Patient have a wound vacuum      6/1    Patient scheduled for surgery today    6/2    Surgery was canceled yesterday  As overall prognosis very poor  Patient on vent on 21% O2  Getting PEG tube feeding  Wound vacuum intact      6/3    Patient still on ventilator  Seen by vascular surgeon left foot concern of ischemic changes    6/4    Patient extubated /tachypneic daughter at the bedside    6/5    Patient on BiPAP  30% O2    6/6    Patient on BiPAP 30% O2    Awake not in distress    6/7    Still on BiPAP 30% O2  Awake    6/8    Patient alert awake on BiPAP 30% O2 patient's daughter at the bedside  Still waiting for family decision regarding further treatment plan  Regarding hospice    6/9    Patient sleeping in the bed on BiPAP 30% percent O2  Wound vacuum in place    6/10    Patient off BiPAP on nasal cannula 3 L    6/11    On nasal cannula oxygen saturation 99%  Patient spiked fever 102.7 early this morning          Objective:     Visit Vitals  BP (!) 152/64 (BP 1 Location: Right upper arm, BP Patient Position: At rest)   Pulse 80   Temp 99.1 °F (37.3 °C)   Resp 19   Ht 5' 6.93\" (1.7 m)   Wt 95.1 kg (209 lb 10.5 oz)   SpO2 98%   Breastfeeding No   BMI 32.91 kg/m²        Recent Results (from the past 24 hour(s))   GLUCOSE, POC    Collection Time: 06/13/22  2:44 PM   Result Value Ref Range    Glucose (POC) 165 (H) 65 - 117 mg/dL    Performed by Ellie Mills    GLUCOSE, POC    Collection Time: 06/13/22  6:45 PM   Result Value Ref Range    Glucose (POC) 260 (H) 65 - 117 mg/dL    Performed by Selma Verde    GLUCOSE, POC    Collection Time: 06/14/22 12:00 AM   Result Value Ref Range    Glucose (POC) 296 (H) 65 - 117 mg/dL    Performed by Oumar Rose    GLUCOSE, POC    Collection Time: 06/14/22  5:25 AM   Result Value Ref Range    Glucose (POC) 288 (H) 65 - 117 mg/dL    Performed by Margi Hernandez    PROTHROMBIN TIME + INR    Collection Time: 06/14/22  5:47 AM   Result Value Ref Range    Prothrombin time 18.7 (H) 11.9 - 14.6 sec    INR 1.6 (H) 0.9 - 1.1     METABOLIC PANEL, COMPREHENSIVE    Collection Time: 06/14/22  5:47 AM   Result Value Ref Range    Sodium 154 (H) 136 - 145 mmol/L    Potassium 3.9 3.5 - 5.1 mmol/L    Chloride 121 (H) 97 - 108 mmol/L    CO2 27 21 - 32 mmol/L    Anion gap 6 5 - 15 mmol/L    Glucose 279 (H) 65 - 100 mg/dL    BUN 35 (H) 6 - 20 mg/dL    Creatinine 0.91 0.55 - 1.02 mg/dL    BUN/Creatinine ratio 38 (H) 12 - 20      GFR est AA >60 >60 ml/min/1.73m2    GFR est non-AA >60 >60 ml/min/1.73m2    Calcium 8.9 8.5 - 10.1 mg/dL    Bilirubin, total 0.4 0.2 - 1.0 mg/dL    AST (SGOT) 14 (L) 15 - 37 U/L    ALT (SGPT) 26 12 - 78 U/L    Alk.  phosphatase 101 45 - 117 U/L    Protein, total 6.8 6.4 - 8.2 g/dL    Albumin 1.9 (L) 3.5 - 5.0 g/dL    Globulin 4.9 (H) 2.0 - 4.0 g/dL    A-G Ratio 0.4 (L) 1.1 - 2.2     CBC WITH AUTOMATED DIFF    Collection Time: 06/14/22  5:47 AM   Result Value Ref Range    WBC 14.8 (H) 3.6 - 11.0 K/uL    RBC 3.21 (L) 3.80 - 5.20 M/uL    HGB 9.0 (L) 11.5 - 16.0 g/dL    HCT 29.8 (L) 35.0 - 47.0 %    MCV 92.8 80.0 - 99.0 FL    MCH 28.0 26.0 - 34.0 PG MCHC 30.2 30.0 - 36.5 g/dL    RDW 17.3 (H) 11.5 - 14.5 %    PLATELET 373 055 - 350 K/uL    MPV 11.1 8.9 - 12.9 FL    NRBC 0.0 0.0  WBC    ABSOLUTE NRBC 0.00 0.00 - 0.01 K/uL    NEUTROPHILS 79 (H) 32 - 75 %    LYMPHOCYTES 13 12 - 49 %    MONOCYTES 5 5 - 13 %    EOSINOPHILS 1 0 - 7 %    BASOPHILS 1 0 - 1 %    IMMATURE GRANULOCYTES 1 (H) 0 - 0.5 %    ABS. NEUTROPHILS 11.8 (H) 1.8 - 8.0 K/UL    ABS. LYMPHOCYTES 2.0 0.8 - 3.5 K/UL    ABS. MONOCYTES 0.7 0.0 - 1.0 K/UL    ABS. EOSINOPHILS 0.2 0.0 - 0.4 K/UL    ABS. BASOPHILS 0.1 0.0 - 0.1 K/UL    ABS. IMM. GRANS. 0.1 (H) 0.00 - 0.04 K/UL    DF AUTOMATED     PROCALCITONIN    Collection Time: 06/14/22  5:47 AM   Result Value Ref Range    Procalcitonin 0.59 (H) 0 ng/mL     [unfilled]      Review of Systems    Not a good historian e. Physical Exam:      Constitutional: On ventilator  HENT:   Head: Normocephalic and atraumatic. Eyes: Pupils are equal, round, and reactive to light. EOM are normal.   Cardiovascular: Normal rate, regular rhythm and normal heart sounds. Pulmonary/Chest: Breath sounds normal. No wheezes. No rales. Exhibits no tenderness. Abdominal: Soft. Bowel sounds are normal. There is no abdominal tenderness. There is no rebound and no guarding. Musculoskeletal: Normal range of motion. Neurological: On ventilator   skin sacral decubitus ulcer and left foot wound right big toe amputation    XR CHEST PORT   Final Result   1. Shallow depth of inspiration with improving interstitial markings but new   area of atelectasis or infiltrate in the right upper lobe adjacent to the minor   fissure. XR CHEST PORT   Final Result      XR CHEST PORT   Final Result   1. There has been an improvement in the pulmonary edema pattern particularly   within the left perihilar region. There is persistent milder interstitial edema   within the right lung. 2.  This examination is negative for new pulmonary pathology or additional   interval changes. XR CHEST PORT   Final Result      XR CHEST PORT   Final Result   Pulmonary vascular congestion and predominantly perihilar opacities,   increased. XR CHEST PORT   Final Result   Worsening lung aeration. XR CHEST PORT   Final Result   No significant change. XR CHEST PORT   Final Result   No significant change. XR CHEST PORT   Final Result      XR CHEST PORT   Final Result      XR CHEST PORT   Final Result   No interval change or acute process. XR CHEST PORT   Final Result   ET tube retracted from previous, with tip now at the level of the   thoracic inlet, 8-9 cm above the jennifer. Stable appearance of right central   line. Stable mild enlargement of cardiopericardial silhouette. No evidence of   pulmonary edema, air space pneumonia, or pleural effusion. No evidence of   pneumothorax. Some structures are partially obscured by overlying monitoring   electrodes. XR CHEST PORT   Final Result   Basilar airspace disease, possibly subsegmental atelectasis, stable. XR CHEST PORT   Final Result   Cardiomegaly with vascular congestion. Stable appearance of ET tube. Right central line placed, without radiographic evidence of complication. IR INSERT NON TUNL CVC OVER 5 YRS   Final Result   Successful placement of a triple-lumen central venous catheter. The   catheter is ready for use. XR CHEST PORT   Final Result      XR CHEST PORT   Final Result      XR CHEST PORT   Final Result      CT ABD PELV WO CONT   Final Result   Third spacing of fluids with small volume ascites, small-moderate   pleural effusions, and mild body wall edema. Bibasilar atelectasis. XR CHEST PORT   Final Result   Similar findings compared to single view chest 1 day earlier. XR CHEST PORT   Final Result   Findings/IMPRESSION: Stable appearance of endotracheal tube. Stable mild   enlargement of cardiomediastinal silhouette.  Central vascular congestion with   bilateral perihilar and basilar opacities, consistent with edema and/or   pneumonia, right greater than left. Possible right pleural effusion. No   pneumothorax. XR CHEST PORT   Final Result   Bibasilar opacities, possibly increased on the right. XR CHEST PORT   Final Result   No significant change allowing for difference in technique and   positioning. Single portable AP view compared to yesterday. Suboptimal inspiratory film   compromises visualization of the lower lung fields. Monitoring equipment   overlies the body. The tip of the tube is approximately 3 cm above the jennifer. Mild apparent cardiomegaly. Left heart border and left diaphragm obscured. Hazy airspace opacification both lung bases may be a combination of atelectasis,   pneumonia, or edema. No large effusion. Negative for pneumothorax. XR CHEST PORT   Final Result   No significant change allowing for difference in technique and   positioning. Single portable AP view compared to yesterday. Rotation to the right. Monitoring   equipment overlies the body. Suboptimal inspiratory film compromises   visualization of the lower lung fields. Mild cardiomegaly. The tip of the ET tube is approximately 3 cm above the jennifer. Mild basal interstitial edema. No new consolidation. No pleural effusion or   pneumothorax. XR CHEST PORT   Final Result   1. The endotracheal tube is in satisfactory position. 2.  There has been a partial interval resolution in the pulmonary interstitial   edema pattern. XR CHEST PORT   Final Result   Ill-defined haziness in the mid to lower lung zones suspect for mild   atelectatic changes and/or interstitial fluid or pleural fluid. US ABD LTD   Final Result   1. Question pericholecystic fluid. No identified gallstones or sludge. 2. Right pleural effusion. 3. Increased right renal echotexture for medical renal disease. XR CHEST PORT   Final Result   Intubation. Findings suggesting hydrostatic edema.       XR CHEST PORT   Final Result   No evidence of an acute cardiopulmonary process. IR FLUORO GUIDE PLC CVAD    (Results Pending)   IR US GUIDED VASCULAR ACCESS    (Results Pending)        Recent Results (from the past 24 hour(s))   GLUCOSE, POC    Collection Time: 06/13/22  2:44 PM   Result Value Ref Range    Glucose (POC) 165 (H) 65 - 117 mg/dL    Performed by Mariela Gastelum    GLUCOSE, POC    Collection Time: 06/13/22  6:45 PM   Result Value Ref Range    Glucose (POC) 260 (H) 65 - 117 mg/dL    Performed by Allegra Davis    GLUCOSE, POC    Collection Time: 06/14/22 12:00 AM   Result Value Ref Range    Glucose (POC) 296 (H) 65 - 117 mg/dL    Performed by Ethelda Blizzard    GLUCOSE, POC    Collection Time: 06/14/22  5:25 AM   Result Value Ref Range    Glucose (POC) 288 (H) 65 - 117 mg/dL    Performed by Zoë Davis    PROTHROMBIN TIME + INR    Collection Time: 06/14/22  5:47 AM   Result Value Ref Range    Prothrombin time 18.7 (H) 11.9 - 14.6 sec    INR 1.6 (H) 0.9 - 1.1     METABOLIC PANEL, COMPREHENSIVE    Collection Time: 06/14/22  5:47 AM   Result Value Ref Range    Sodium 154 (H) 136 - 145 mmol/L    Potassium 3.9 3.5 - 5.1 mmol/L    Chloride 121 (H) 97 - 108 mmol/L    CO2 27 21 - 32 mmol/L    Anion gap 6 5 - 15 mmol/L    Glucose 279 (H) 65 - 100 mg/dL    BUN 35 (H) 6 - 20 mg/dL    Creatinine 0.91 0.55 - 1.02 mg/dL    BUN/Creatinine ratio 38 (H) 12 - 20      GFR est AA >60 >60 ml/min/1.73m2    GFR est non-AA >60 >60 ml/min/1.73m2    Calcium 8.9 8.5 - 10.1 mg/dL    Bilirubin, total 0.4 0.2 - 1.0 mg/dL    AST (SGOT) 14 (L) 15 - 37 U/L    ALT (SGPT) 26 12 - 78 U/L    Alk.  phosphatase 101 45 - 117 U/L    Protein, total 6.8 6.4 - 8.2 g/dL    Albumin 1.9 (L) 3.5 - 5.0 g/dL    Globulin 4.9 (H) 2.0 - 4.0 g/dL    A-G Ratio 0.4 (L) 1.1 - 2.2     CBC WITH AUTOMATED DIFF    Collection Time: 06/14/22  5:47 AM   Result Value Ref Range    WBC 14.8 (H) 3.6 - 11.0 K/uL    RBC 3.21 (L) 3.80 - 5.20 M/uL    HGB 9.0 (L) 11.5 - 16.0 g/dL HCT 29.8 (L) 35.0 - 47.0 %    MCV 92.8 80.0 - 99.0 FL    MCH 28.0 26.0 - 34.0 PG    MCHC 30.2 30.0 - 36.5 g/dL    RDW 17.3 (H) 11.5 - 14.5 %    PLATELET 272 940 - 466 K/uL    MPV 11.1 8.9 - 12.9 FL    NRBC 0.0 0.0  WBC    ABSOLUTE NRBC 0.00 0.00 - 0.01 K/uL    NEUTROPHILS 79 (H) 32 - 75 %    LYMPHOCYTES 13 12 - 49 %    MONOCYTES 5 5 - 13 %    EOSINOPHILS 1 0 - 7 %    BASOPHILS 1 0 - 1 %    IMMATURE GRANULOCYTES 1 (H) 0 - 0.5 %    ABS. NEUTROPHILS 11.8 (H) 1.8 - 8.0 K/UL    ABS. LYMPHOCYTES 2.0 0.8 - 3.5 K/UL    ABS. MONOCYTES 0.7 0.0 - 1.0 K/UL    ABS. EOSINOPHILS 0.2 0.0 - 0.4 K/UL    ABS. BASOPHILS 0.1 0.0 - 0.1 K/UL    ABS. IMM. GRANS. 0.1 (H) 0.00 - 0.04 K/UL    DF AUTOMATED     PROCALCITONIN    Collection Time: 06/14/22  5:47 AM   Result Value Ref Range    Procalcitonin 0.59 (H) 0 ng/mL       Results     Procedure Component Value Units Date/Time    CULTURE, BLOOD [[id number]] Collected: 06/13/22 2024    Order Status: Completed Specimen: Blood Updated: 06/13/22 2042    CULTURE, Gifty Sykeso STAIN [[id number]] Collected: 06/01/22 1900    Order Status: Canceled Specimen: Wound from [address] [place] [address], Gifty Sykeso STAIN [[id number]] Collected: 06/01/22 1845    Order Status: Canceled Specimen: Wound from PEG Site            Labs:     Recent Labs     06/14/22  0547 06/13/22  0823   WBC 14.8* 15.1*   HGB 9.0* 9.4*   HCT 29.8* 30.9*    383     Recent Labs     06/14/22  0547 06/13/22  0823 06/12/22  1603   * 153*  --    K 3.9 3.6  --    * 122*  --    CO2 27 25  --    BUN 35* 34*  --    CREA 0.91 0.90 0.83   * 206*  --    CA 8.9 9.1  --    MG  --  2.2  --    PHOS  --  3.0  --      Recent Labs     06/14/22  0547 06/13/22  0823   ALT 26 32    114   TBILI 0.4 0.4   TP 6.8 7.7   ALB 1.9* 2.0*   GLOB 4.9* 5.7*     Recent Labs     06/14/22  0547 06/13/22  0823 06/12/22  0712   INR 1.6* 1.6* 1.5*   PTP 18.7* 18.8* 17.9*      No results for input(s): FE, TIBC, PSAT, FERR in the last 72 hours. No results found for: FOL, RBCF   No results for input(s): PH, PCO2, PO2 in the last 72 hours. No results for input(s): CPK, CKNDX, TROIQ in the last 72 hours.     No lab exists for component: CPKMB  Lab Results   Component Value Date/Time    Cholesterol, total 124 03/08/2022 07:40 AM    HDL Cholesterol 30 03/08/2022 07:40 AM    LDL,Direct 79 06/28/2021 12:00 AM    LDL, calculated 44.8 03/08/2022 07:40 AM    Triglyceride 202 (H) 05/16/2022 06:20 AM    CHOL/HDL Ratio 4.1 03/08/2022 07:40 AM     Lab Results   Component Value Date/Time    Glucose (POC) 288 (H) 06/14/2022 05:25 AM    Glucose (POC) 296 (H) 06/14/2022 12:00 AM    Glucose (POC) 260 (H) 06/13/2022 06:45 PM    Glucose (POC) 165 (H) 06/13/2022 02:44 PM    Glucose (POC) 210 (H) 06/13/2022 05:32 AM     Lab Results   Component Value Date/Time    Color Yellow/Straw 05/31/2022 05:56 AM    Appearance Turbid (A) 05/31/2022 05:56 AM    Specific gravity 1.006 05/31/2022 05:56 AM    pH (UA) 5.0 05/31/2022 05:56 AM    Protein 30 (A) 05/31/2022 05:56 AM    Glucose Negative 05/31/2022 05:56 AM    Ketone Negative 05/31/2022 05:56 AM    Bilirubin Negative 05/31/2022 05:56 AM    Urobilinogen 0.1 05/31/2022 05:56 AM    Nitrites Negative 05/31/2022 05:56 AM    Leukocyte Esterase Large (A) 05/31/2022 05:56 AM    Bacteria Negative 05/31/2022 05:56 AM    WBC >100 (H) 05/31/2022 05:56 AM    RBC  05/31/2022 05:56 AM         Assessment:   Acute respiratory failure extubated on nasal cannula   sacral decubitus ulcer postdebridement  Sepsis with leukocytosis elevated procalcitonin and CRP  Left foot wound  Recent removal of left great toe and second toe  CVA with PEG tube placement  History of aspiration pneumonia  History of chronic kidney disease  CAD with ejection fraction about 20 to 25%  Hypertension  Hyperlipidemia  Anemia of chronic disease  Hyperkalemia  Static post transfusion  Uncontrolled diabetes  Hepatic shock/improving  Coagulopathy  Elevated Troponin  Bacteremia with coagulase-negative Staphylococcus  Sacral wound secondary to Acinetobacter  and Enterococcus  Procedure:  1. Left transmetatarsal amputation. 2.  Sacral wound excisional wound debridement 13 x 15 cm to the bone.     Hypotension off  Levophed  Hypokalemia/replace potassium  Severe protein calorie malnutrition  Hypernatremia  Plan:     Water flush 200 cc every 4 hours  Unasyn 3 g IV every 8 hours  Aspirin 81 mg daily  FiberCon 625 mg daily  Questran 4 g twice a day  Lovenox 40 subcu daily  Pepcid 20 twice daily  Ferrous sulfate 325 mg twice a day  Flucanazole 200 mg every 24 hours  Lasix 40 mg every 12 hours  Gabapentin 300 mg twice a day  Robinul 0.1 mg every 6 hours  Hydralazine 12.5 mg 3 times a day  Lantus 10 units subcu at bedtime  Lantus 50 units subcu daily  Lopressor 12.5 twice daily  Entresto 1 tab twice a day  Repeat  the labs overall prognosis very poor    Detailed discussion with the patient's daughter about poor prognosis   At bedside  laparoscopic loop colostomy for fecal diversion canceled    Monitor H&H    Patient's start fevers seen by the infectious disease  Started on IV AVYCAZ and continue flucanazole till today    Discussed with the patient daughter at the bedside  Wound vacuum in place    Overall prognosis poor               Current Facility-Administered Medications:     cefTAZidime-avibactam (AVYCAZ) 2.5 g in 0.9% sodium chloride 100 mL ([id number]), 2.5 g, IntraVENous, Q8H, Raissa Dutta MD, 2.5 g at 06/14/22 0607    scopolamine (TRANSDERM-SCOP) 1 mg over 3 days 1 Patch, 1 Patch, TransDERmal, Q72H, Israel Soto MD, 1 Patch at 06/13/22 2144    VANCOMYCIN RANDOM LAB REMINDER, , Other, ONCE, Israel Soto MD    vancomycin (VANCOCIN) 500 mg in 0.9% sodium chloride (MBP/ADV) 100 mL MBP, 500 mg, IntraVENous, Q24H, Israel Soto MD, Last Rate: 100 mL/hr at 06/13/22 1850, 500 mg at 06/13/22 1850    calcium polycarbophiL (FIBERCON) tablet 625 mg, 1 Tablet, Oral, DAILY, Israel Soto MD, 625 mg at 06/14/22 0915    ferrous sulfate tablet 325 mg, 1 Tablet, Oral, BID, Israel Soto MD, 325 mg at 06/14/22 0916    furosemide (LASIX) injection 40 mg, 40 mg, IntraVENous, Q12H, Miguel Law MD, 40 mg at 06/14/22 0914    metoprolol tartrate (LOPRESSOR) tablet 12.5 mg, 12.5 mg, Per NG tube, BID, Israel Soto MD, 12.5 mg at 06/14/22 0916    alteplase (CATHFLO) injection 2 mg, 2 mg, InterCATHeter, PRN, Diana Barahona MD, 2 mg at 06/03/22 1708    fluconazole (DIFLUCAN) 200mg/100 mL IVPB (premix), 200 mg, IntraVENous, Q24H, Rich Ramsey MD, Last Rate: 100 mL/hr at 06/14/22 0914, 200 mg at 06/14/22 0914    aspirin chewable tablet 81 mg, 81 mg, Per G Tube, DAILY, Israel Soto MD, 81 mg at 06/14/22 0915    sodium chloride (NS) flush 5-40 mL, 5-40 mL, IntraVENous, Q8H, Mati Matthew MD, 10 mL at 06/14/22 0607    sodium chloride (NS) flush 5-40 mL, 5-40 mL, IntraVENous, PRN, Rema Hills MD, 10 mL at 05/31/22 2213    [DISCONTINUED] ondansetron (ZOFRAN ODT) tablet 4 mg, 4 mg, Oral, Q8H PRN **OR** ondansetron (ZOFRAN) injection 4 mg, 4 mg, IntraVENous, Q6H PRN, Moshe Bob MD    enoxaparin (LOVENOX) injection 40 mg, 40 mg, SubCUTAneous, DAILY, Moshe Bob MD, 40 mg at 06/14/22 0915    insulin lispro (HUMALOG) injection, , SubCUTAneous, Q6H, Moshe Bob MD, 3 Units at 06/14/22 0600    glucose chewable tablet 16 g, 4 Tablet, Oral, PRN, Moshe Bob MD    glucagon [facility]) injection 1 mg, 1 mg, IntraMUSCular, PRN, Moseh Bob MD    hydrALAZINE (APRESOLINE) tablet 12.5 mg, 12.5 mg, Oral, TID, Moshe Bob MD, 12.5 mg at 06/14/22 0916    sacubitriL-valsartan (ENTRESTO) 24-26 mg tablet 1 Tablet, 1 Tablet, Oral, Q12H, Moshe Bob MD, 1 Tablet at 06/14/22 0915    dextrose 10% infusion 0-250 mL, 0-250 mL, IntraVENous, PRN, Moshe Bob MD, 125 mL at 05/19/22 0537    insulin glargine (LANTUS) injection 10 Units, 10 Units, SubCUTAneous, QHS, Marlena Bob MD, 10 Units at 06/14/22 0014    0.9% sodium chloride infusion 250 mL, 250 mL, IntraVENous, PRN, Marlena Bob MD, Last Rate: 15 mL/hr at 05/22/22 0820, Rate Verify at 05/22/22 0820    Vancomycin Pharmacy Dosing, , Other, Rx Dosing/Monitoring, Marlena Bob MD    acetaminophen (TYLENOL) tablet 650 mg, 650 mg, Oral, Q6H PRN, 650 mg at 06/14/22 0411 **OR** acetaminophen (TYLENOL) suppository 650 mg, 650 mg, Rectal, Q6H PRN, Marlena Bob MD, 650 mg at 05/16/22 2223    polyethylene glycol (MIRALAX) packet 17 g, 17 g, Oral, DAILY PRN, Marlena Bob MD    ondansetron (ZOFRAN ODT) tablet 4 mg, 4 mg, Oral, Q8H PRN **OR** [DISCONTINUED] ondansetron (ZOFRAN) injection 4 mg, 4 mg, IntraVENous, Q6H PRN, Marlena Bob MD    famotidine (PEPCID) tablet 20 mg, 20 mg, Oral, BID, Marlena Bob MD, 20 mg at 06/14/22 1357    acidophilus-pectin, citrus tablet 2 Tablet, 2 Tablet, Oral, DAILY, Marlena Bob MD, 2 Tablet at 06/14/22 0915    albuterol-ipratropium (DUO-NEB) 2.5 MG-0.5 MG/3 ML, 3 mL, Nebulization, Q6H PRN, Marlena Bob MD    artificial saliva (MOUTH KOTE) 1 Spray, 1 Spray, Oral, TID, Marlena Bob MD, 1 [place] at 06/14/22 0917    brimonidine (ALPHAGAN) 0.2 % ophthalmic solution 1 Drop, 1 Drop, Both Eyes, TID, Marlena Bob MD, 1 Drop at 06/14/22 0917    cholestyramine-aspartame (QUESTRAN LIGHT) packet 4 g, 4 g, Oral, BID WITH MEALS, Marlena Bob MD, 4 g at 06/14/22 0916    [Held by provider] fenofibrate nanocrystallized (TRICOR) tablet 48 mg, 48 mg, Oral, DAILY, Marlena Bob MD, 48 mg at 05/19/22 4131    gabapentin (NEURONTIN) capsule 300 mg, 300 mg, Oral, BID, Marlena Bob MD, 300 mg at 06/14/22 0916    insulin glargine (LANTUS) injection 50 Units, 50 Units, SubCUTAneous, DAILY, Marlena Bob MD, 50 Units at 06/14/22 0914    latanoprost (XALATAN) 0.005 % ophthalmic solution 1 Drop, 1 Drop, Right Eye, QPM, Marlena Bob MD, 1 Drop at 06/13/22 9811    traMADoL (ULTRAM) tablet 25 mg, 25 mg, Oral, Q12H PRN, Paulino, Sadie Bolton MD, 25 mg at 06/12/22 9509

## 2022-06-15 NOTE — PROGRESS NOTES
Problem: Falls - Risk of  Goal: *Absence of Falls  Description: Document Lakesha Mandes Fall Risk and appropriate interventions in the flowsheet.   Outcome: Progressing Towards Goal  Note: Fall Risk Interventions:  Mobility Interventions: Bed/chair exit alarm    Mentation Interventions: Bed/chair exit alarm,Room close to nurse's station,More frequent rounding    Medication Interventions: Bed/chair exit alarm    Elimination Interventions: Bed/chair exit alarm

## 2022-06-15 NOTE — PROGRESS NOTES
General Daily Progress Note          Patient Name:   Lavell Wu       YOB: 1945       Age:  78 y.o. Admit Date: 5/14/2022      Subjective:     78years old female with significant past medical history of CVA with PEG tube chronic kidney disease CVA with right-sided weakness bedbound DVT of the left great toe status post removal of the left great and second toe history of aspiration pneumonia history of sacral decubitus ulcer patient was recently discharged from the hospitalPatient discharged to nursing home upon p.o. Cipro    Admitted again yesterday concerning for worsening of sacral ulcer    During last admission patient was seen by infectious disease and also seen by the vascular surgeon patient had debridement of wound during the last admission    Patient seen by the infectious disease yesterday    Sacral wound infection recent cultures growing Pseudomonas resistant to Zosyn and meropenem    5/17    Patient alert awake not in distress  Patient was seen by the vascular surgeon    Vascular surgery planned for laparoscopic loop colostomy placement and sacral debridement then wound vacuum  Also try to close the wound on the left foot with TMA    5/18    Resting in the bed not in any distress  Blood sugars running high    Seen by infectious disease continue vancomycin and start on Unasyn    5/19    And went to the OR intubated because of elevated LFT and elevated INR  Surgery was canceled    On ventilator 40% O2  Propofol drip    5/20    Patient on ventilator with 30% O2  On propofol drip    Hemoglobin dropped to 6.8    Patient's daughter at the bedside    5/20    Patient on ventilator 30% O2  On propofol drip  Getting PEG tube feeding    5/21    Patient still on ventilator 30% O2  On propofol drip  Liver Function improving    5/22  On ventilator with 30% O2 on propofol drip    5/24  Awaiting tissue culture results.    On ventilator with 30% O2 on propofol drip  Left transmetatarsal amputation and Sacral wound excisional wound debridement 13 x 15 cm to the bone completed yesterday. CXR: Hazy mid and lower lung reticular markings.  Dependent small to moderate volume,  right greater than left pleural effusions  Remarkable labs   WBC: 15.2   Hgb: 8.5   Na; 148  CRP: 13.60   Procal: 0.71      5/25    Patient on ventilator with 30% O2  Seen by the vascular surgeon and plan for surgery today    5/26  Patient on ventilator with 30% O2  Hypotensive on Levophed  On propofol drip    Surgery was canceled yesterday because patient unstable hemodynamically not cleared by the anesthesia    5/27    Patient on ventilator with 30% O2  Hypotensive on Levophed  Getting PEG tube feeding    5/30     Patient on ventilator with 21% O2  On propofol drip  Off pressor    5/31    Patient on ventilator with 21% O2  On propofol drip    Off pressor  Patient have a wound vacuum      6/1    Patient scheduled for surgery today    6/2    Surgery was canceled yesterday  As overall prognosis very poor  Patient on vent on 21% O2  Getting PEG tube feeding  Wound vacuum intact      6/3    Patient still on ventilator  Seen by vascular surgeon left foot concern of ischemic changes    6/4    Patient extubated /tachypneic daughter at the bedside    6/5    Patient on BiPAP  30% O2    6/6    Patient on BiPAP 30% O2    Awake not in distress    6/7    Still on BiPAP 30% O2  Awake    6/8    Patient alert awake on BiPAP 30% O2 patient's daughter at the bedside  Still waiting for family decision regarding further treatment plan  Regarding hospice    6/9    Patient sleeping in the bed on BiPAP 30% percent O2  Wound vacuum in place    6/10    Patient off BiPAP on nasal cannula 3 L    6/11    On nasal cannula oxygen saturation 99%  Patient spiked fever 102.7 early this morning          Objective:     Visit Vitals  BP (!) 159/64   Pulse 81   Temp 98.1 °F (36.7 °C)   Resp 16   Ht 5' 6.93\" (1.7 m)   Wt 97.4 kg (214 lb 11.7 oz)   SpO2 99% Breastfeeding No   BMI 33.70 kg/m²        Recent Results (from the past 24 hour(s))   GLUCOSE, POC    Collection Time: 06/14/22 12:08 PM   Result Value Ref Range    Glucose (POC) 259 (H) 65 - 117 mg/dL    Performed by Domenick Peabody TONI    URINALYSIS W/ REFLEX CULTURE    Collection Time: 06/14/22 12:30 PM    Specimen: Urine   Result Value Ref Range    Color Yellow/Straw      Appearance Clear Clear      Specific gravity 1.011 1.003 - 1.030      pH (UA) 5.0 5.0 - 8.0      Protein Negative Negative mg/dL    Glucose 50 (A) Negative mg/dL    Ketone Negative Negative mg/dL    Bilirubin Negative Negative      Blood Small (A) Negative      Urobilinogen 0.1 0.1 - 1.0 EU/dL    Nitrites Negative Negative      Leukocyte Esterase Small (A) Negative      WBC 20-50 0 - 4 /hpf    RBC 0-5 0 - 5 /hpf    Bacteria Negative Negative /hpf    UA:UC IF INDICATED Urine Culture Ordered (A) Culture not indicated by UA result      Mucus Trace (A) Negative /lpf    Budding yeast Present (A) Negative     VANCOMYCIN, TROUGH    Collection Time: 06/14/22  3:43 PM   Result Value Ref Range    Vancomycin,trough 16.4 (H) 5.0 - 10.0 ug/mL   CREATININE    Collection Time: 06/14/22  3:43 PM   Result Value Ref Range    Creatinine 0.89 0.55 - 1.02 mg/dL   GLUCOSE, POC    Collection Time: 06/14/22  5:58 PM   Result Value Ref Range    Glucose (POC) 313 (H) 65 - 117 mg/dL    Performed by Isela Luo    GLUCOSE, POC    Collection Time: 06/14/22 10:07 PM   Result Value Ref Range    Glucose (POC) 313 (H) 65 - 117 mg/dL    Performed by Veronica Ayala    GLUCOSE, POC    Collection Time: 06/15/22  5:20 AM   Result Value Ref Range    Glucose (POC) 288 (H) 65 - 117 mg/dL    Performed by REYES SANDRA    METABOLIC PANEL, BASIC    Collection Time: 06/15/22  5:40 AM   Result Value Ref Range    Sodium 150 (H) 136 - 145 mmol/L    Potassium 3.8 3.5 - 5.1 mmol/L    Chloride 116 (H) 97 - 108 mmol/L    CO2 27 21 - 32 mmol/L    Anion gap 7 5 - 15 mmol/L    Glucose 283 (H) 65 - 100 mg/dL    BUN 31 (H) 6 - 20 mg/dL    Creatinine 0.77 0.55 - 1.02 mg/dL    BUN/Creatinine ratio 40 (H) 12 - 20      GFR est AA >60 >60 ml/min/1.73m2    GFR est non-AA >60 >60 ml/min/1.73m2    Calcium 9.0 8.5 - 10.1 mg/dL   MAGNESIUM    Collection Time: 06/15/22  5:40 AM   Result Value Ref Range    Magnesium 2.1 1.6 - 2.4 mg/dL   PHOSPHORUS    Collection Time: 06/15/22  5:40 AM   Result Value Ref Range    Phosphorus 2.2 (L) 2.6 - 4.7 mg/dL   PROTHROMBIN TIME + INR    Collection Time: 06/15/22  5:40 AM   Result Value Ref Range    Prothrombin time 18.7 (H) 11.9 - 14.6 sec    INR 1.6 (H) 0.9 - 1.1     CBC WITH AUTOMATED DIFF    Collection Time: 06/15/22  5:40 AM   Result Value Ref Range    WBC 15.7 (H) 3.6 - 11.0 K/uL    RBC 3.59 (L) 3.80 - 5.20 M/uL    HGB 9.8 (L) 11.5 - 16.0 g/dL    HCT 33.4 (L) 35.0 - 47.0 %    MCV 93.0 80.0 - 99.0 FL    MCH 27.3 26.0 - 34.0 PG    MCHC 29.3 (L) 30.0 - 36.5 g/dL    RDW 17.1 (H) 11.5 - 14.5 %    PLATELET 867 019 - 921 K/uL    MPV 11.7 8.9 - 12.9 FL    NRBC 0.0 0.0  WBC    ABSOLUTE NRBC 0.00 0.00 - 0.01 K/uL    NEUTROPHILS 83 (H) 32 - 75 %    LYMPHOCYTES 9 (L) 12 - 49 %    MONOCYTES 3 (L) 5 - 13 %    EOSINOPHILS 3 0 - 7 %    BASOPHILS 1 0 - 1 %    IMMATURE GRANULOCYTES 1 (H) 0 - 0.5 %    ABS. NEUTROPHILS 13.0 (H) 1.8 - 8.0 K/UL    ABS. LYMPHOCYTES 1.4 0.8 - 3.5 K/UL    ABS. MONOCYTES 0.5 0.0 - 1.0 K/UL    ABS. EOSINOPHILS 0.5 (H) 0.0 - 0.4 K/UL    ABS. BASOPHILS 0.1 0.0 - 0.1 K/UL    ABS. IMM. GRANS. 0.1 (H) 0.00 - 0.04 K/UL    DF AUTOMATED     C REACTIVE PROTEIN, QT    Collection Time: 06/15/22  5:40 AM   Result Value Ref Range    C-Reactive protein 13.60 (H) 0.00 - 0.60 mg/dL     [unfilled]      Review of Systems    Not a good historian e. Physical Exam:      Constitutional: On ventilator  HENT:   Head: Normocephalic and atraumatic. Eyes: Pupils are equal, round, and reactive to light.  EOM are normal.   Cardiovascular: Normal rate, regular rhythm and normal heart sounds. Pulmonary/Chest: Breath sounds normal. No wheezes. No rales. Exhibits no tenderness. Abdominal: Soft. Bowel sounds are normal. There is no abdominal tenderness. There is no rebound and no guarding. Musculoskeletal: Normal range of motion. Neurological: On ventilator   skin sacral decubitus ulcer and left foot wound right big toe amputation    XR CHEST PORT   Final Result   1. Shallow depth of inspiration with improving interstitial markings but new   area of atelectasis or infiltrate in the right upper lobe adjacent to the minor   fissure. XR CHEST PORT   Final Result      XR CHEST PORT   Final Result   1. There has been an improvement in the pulmonary edema pattern particularly   within the left perihilar region. There is persistent milder interstitial edema   within the right lung. 2.  This examination is negative for new pulmonary pathology or additional   interval changes. XR CHEST PORT   Final Result      XR CHEST PORT   Final Result   Pulmonary vascular congestion and predominantly perihilar opacities,   increased. XR CHEST PORT   Final Result   Worsening lung aeration. XR CHEST PORT   Final Result   No significant change. XR CHEST PORT   Final Result   No significant change. XR CHEST PORT   Final Result      XR CHEST PORT   Final Result      XR CHEST PORT   Final Result   No interval change or acute process. XR CHEST PORT   Final Result   ET tube retracted from previous, with tip now at the level of the   thoracic inlet, 8-9 cm above the jennifer. Stable appearance of right central   line. Stable mild enlargement of cardiopericardial silhouette. No evidence of   pulmonary edema, air space pneumonia, or pleural effusion. No evidence of   pneumothorax. Some structures are partially obscured by overlying monitoring   electrodes. XR CHEST PORT   Final Result   Basilar airspace disease, possibly subsegmental atelectasis, stable.       XR CHEST PORT   Final Result   Cardiomegaly with vascular congestion. Stable appearance of ET tube. Right central line placed, without radiographic evidence of complication. IR INSERT NON TUNL CVC OVER 5 YRS   Final Result   Successful placement of a triple-lumen central venous catheter. The   catheter is ready for use. XR CHEST PORT   Final Result      XR CHEST PORT   Final Result      XR CHEST PORT   Final Result      CT ABD PELV WO CONT   Final Result   Third spacing of fluids with small volume ascites, small-moderate   pleural effusions, and mild body wall edema. Bibasilar atelectasis. XR CHEST PORT   Final Result   Similar findings compared to single view chest 1 day earlier. XR CHEST PORT   Final Result   Findings/IMPRESSION: Stable appearance of endotracheal tube. Stable mild   enlargement of cardiomediastinal silhouette. Central vascular congestion with   bilateral perihilar and basilar opacities, consistent with edema and/or   pneumonia, right greater than left. Possible right pleural effusion. No   pneumothorax. XR CHEST PORT   Final Result   Bibasilar opacities, possibly increased on the right. XR CHEST PORT   Final Result   No significant change allowing for difference in technique and   positioning. Single portable AP view compared to yesterday. Suboptimal inspiratory film   compromises visualization of the lower lung fields. Monitoring equipment   overlies the body. The tip of the tube is approximately 3 cm above the jennifer. Mild apparent cardiomegaly. Left heart border and left diaphragm obscured. Hazy airspace opacification both lung bases may be a combination of atelectasis,   pneumonia, or edema. No large effusion. Negative for pneumothorax. XR CHEST PORT   Final Result   No significant change allowing for difference in technique and   positioning. Single portable AP view compared to yesterday. Rotation to the right. Monitoring   equipment overlies the body. Suboptimal inspiratory film compromises   visualization of the lower lung fields. Mild cardiomegaly. The tip of the ET tube is approximately 3 cm above the jennifer. Mild basal interstitial edema. No new consolidation. No pleural effusion or   pneumothorax. XR CHEST PORT   Final Result   1. The endotracheal tube is in satisfactory position. 2.  There has been a partial interval resolution in the pulmonary interstitial   edema pattern. XR CHEST PORT   Final Result   Ill-defined haziness in the mid to lower lung zones suspect for mild   atelectatic changes and/or interstitial fluid or pleural fluid. US ABD LTD   Final Result   1. Question pericholecystic fluid. No identified gallstones or sludge. 2. Right pleural effusion. 3. Increased right renal echotexture for medical renal disease. XR CHEST PORT   Final Result   Intubation. Findings suggesting hydrostatic edema. XR CHEST PORT   Final Result   No evidence of an acute cardiopulmonary process.       IR FLUORO GUIDE PLC CVAD    (Results Pending)   IR US GUIDED VASCULAR ACCESS    (Results Pending)        Recent Results (from the past 24 hour(s))   GLUCOSE, POC    Collection Time: 06/14/22 12:08 PM   Result Value Ref Range    Glucose (POC) 259 (H) 65 - 117 mg/dL    Performed by Nakita SUBRAMANIAN    URINALYSIS W/ REFLEX CULTURE    Collection Time: 06/14/22 12:30 PM    Specimen: Urine   Result Value Ref Range    Color Yellow/Straw      Appearance Clear Clear      Specific gravity 1.011 1.003 - 1.030      pH (UA) 5.0 5.0 - 8.0      Protein Negative Negative mg/dL    Glucose 50 (A) Negative mg/dL    Ketone Negative Negative mg/dL    Bilirubin Negative Negative      Blood Small (A) Negative      Urobilinogen 0.1 0.1 - 1.0 EU/dL    Nitrites Negative Negative      Leukocyte Esterase Small (A) Negative      WBC 20-50 0 - 4 /hpf    RBC 0-5 0 - 5 /hpf    Bacteria Negative Negative /hpf    UA:UC IF INDICATED Urine Culture Ordered (A) Culture not indicated by UA result      Mucus Trace (A) Negative /lpf    Budding yeast Present (A) Negative     VANCOMYCIN, TROUGH    Collection Time: 06/14/22  3:43 PM   Result Value Ref Range    Vancomycin,trough 16.4 (H) 5.0 - 10.0 ug/mL   CREATININE    Collection Time: 06/14/22  3:43 PM   Result Value Ref Range    Creatinine 0.89 0.55 - 1.02 mg/dL   GLUCOSE, POC    Collection Time: 06/14/22  5:58 PM   Result Value Ref Range    Glucose (POC) 313 (H) 65 - 117 mg/dL    Performed by Isela Luo    GLUCOSE, POC    Collection Time: 06/14/22 10:07 PM   Result Value Ref Range    Glucose (POC) 313 (H) 65 - 117 mg/dL    Performed by Veronica Ayala    GLUCOSE, POC    Collection Time: 06/15/22  5:20 AM   Result Value Ref Range    Glucose (POC) 288 (H) 65 - 117 mg/dL    Performed by REYES SANDRA    METABOLIC PANEL, BASIC    Collection Time: 06/15/22  5:40 AM   Result Value Ref Range    Sodium 150 (H) 136 - 145 mmol/L    Potassium 3.8 3.5 - 5.1 mmol/L    Chloride 116 (H) 97 - 108 mmol/L    CO2 27 21 - 32 mmol/L    Anion gap 7 5 - 15 mmol/L    Glucose 283 (H) 65 - 100 mg/dL    BUN 31 (H) 6 - 20 mg/dL    Creatinine 0.77 0.55 - 1.02 mg/dL    BUN/Creatinine ratio 40 (H) 12 - 20      GFR est AA >60 >60 ml/min/1.73m2    GFR est non-AA >60 >60 ml/min/1.73m2    Calcium 9.0 8.5 - 10.1 mg/dL   MAGNESIUM    Collection Time: 06/15/22  5:40 AM   Result Value Ref Range    Magnesium 2.1 1.6 - 2.4 mg/dL   PHOSPHORUS    Collection Time: 06/15/22  5:40 AM   Result Value Ref Range    Phosphorus 2.2 (L) 2.6 - 4.7 mg/dL   PROTHROMBIN TIME + INR    Collection Time: 06/15/22  5:40 AM   Result Value Ref Range    Prothrombin time 18.7 (H) 11.9 - 14.6 sec    INR 1.6 (H) 0.9 - 1.1     CBC WITH AUTOMATED DIFF    Collection Time: 06/15/22  5:40 AM   Result Value Ref Range    WBC 15.7 (H) 3.6 - 11.0 K/uL    RBC 3.59 (L) 3.80 - 5.20 M/uL    HGB 9.8 (L) 11.5 - 16.0 g/dL    HCT 33.4 (L) 35.0 - 47.0 %    MCV 93.0 80.0 - 99.0 FL    MCH 27.3 26.0 - 34.0 PG    MCHC 29.3 (L) 30.0 - 36.5 g/dL    RDW 17.1 (H) 11.5 - 14.5 %    PLATELET 344 084 - 386 K/uL    MPV 11.7 8.9 - 12.9 FL    NRBC 0.0 0.0  WBC    ABSOLUTE NRBC 0.00 0.00 - 0.01 K/uL    NEUTROPHILS 83 (H) 32 - 75 %    LYMPHOCYTES 9 (L) 12 - 49 %    MONOCYTES 3 (L) 5 - 13 %    EOSINOPHILS 3 0 - 7 %    BASOPHILS 1 0 - 1 %    IMMATURE GRANULOCYTES 1 (H) 0 - 0.5 %    ABS. NEUTROPHILS 13.0 (H) 1.8 - 8.0 K/UL    ABS. LYMPHOCYTES 1.4 0.8 - 3.5 K/UL    ABS. MONOCYTES 0.5 0.0 - 1.0 K/UL    ABS. EOSINOPHILS 0.5 (H) 0.0 - 0.4 K/UL    ABS. BASOPHILS 0.1 0.0 - 0.1 K/UL    ABS. IMM.  GRANS. 0.1 (H) 0.00 - 0.04 K/UL    DF AUTOMATED     C REACTIVE PROTEIN, QT    Collection Time: 06/15/22  5:40 AM   Result Value Ref Range    C-Reactive protein 13.60 (H) 0.00 - 0.60 mg/dL       Results     Procedure Component Value Units Date/Time    CULTURE, URINE [[id number]] Collected: 06/14/22 1230    Order Status: Completed Specimen: Urine Updated: 06/14/22 1302    CULTURE, BLOOD [[id number]] Collected: 06/13/22 2024    Order Status: Completed Specimen: Blood Updated: 06/14/22 1342     Special Requests: --        No Special Requests  Right       Culture result: No growth after 7 hours       CULTURE, Delmy Sanders STAIN [[id number]] Collected: 06/01/22 1900    Order Status: Canceled Specimen: Wound from [address] [place] [address], Delmy Sanders STAIN [[id number]] Collected: 06/01/22 1845    Order Status: Canceled Specimen: Wound from PEG Site            Labs:     Recent Labs     06/15/22  0540 06/14/22  0547   WBC 15.7* 14.8*   HGB 9.8* 9.0*   HCT 33.4* 29.8*    363     Recent Labs     06/15/22  0540 06/14/22  1543 06/14/22  0547 06/13/22  0823 06/13/22  0823   *  --  154*  --  153*   K 3.8  --  3.9  --  3.6   *  --  121*  --  122*   CO2 27  --  27  --  25   BUN 31*  --  35*  --  34*   CREA 0.77 0.89 0.91   < > 0.90   *  --  279*  --  206*   CA 9.0  --  8.9  --  9.1   MG 2.1  --   --   --  2.2   PHOS 2.2*  --   --   --  3.0    < > = values in this interval not displayed. Recent Labs     06/14/22  0547 06/13/22  0823   ALT 26 32    114   TBILI 0.4 0.4   TP 6.8 7.7   ALB 1.9* 2.0*   GLOB 4.9* 5.7*     Recent Labs     06/15/22  0540 06/14/22  0547 06/13/22  0823   INR 1.6* 1.6* 1.6*   PTP 18.7* 18.7* 18.8*      No results for input(s): FE, TIBC, PSAT, FERR in the last 72 hours. No results found for: FOL, RBCF   No results for input(s): PH, PCO2, PO2 in the last 72 hours. No results for input(s): CPK, CKNDX, TROIQ in the last 72 hours.     No lab exists for component: CPKMB  Lab Results   Component Value Date/Time    Cholesterol, total 124 03/08/2022 07:40 AM    HDL Cholesterol 30 03/08/2022 07:40 AM    LDL,Direct 79 06/28/2021 12:00 AM    LDL, calculated 44.8 03/08/2022 07:40 AM    Triglyceride 202 (H) 05/16/2022 06:20 AM    CHOL/HDL Ratio 4.1 03/08/2022 07:40 AM     Lab Results   Component Value Date/Time    Glucose (POC) 288 (H) 06/15/2022 05:20 AM    Glucose (POC) 313 (H) 06/14/2022 10:07 PM    Glucose (POC) 313 (H) 06/14/2022 05:58 PM    Glucose (POC) 259 (H) 06/14/2022 12:08 PM    Glucose (POC) 288 (H) 06/14/2022 05:25 AM     Lab Results   Component Value Date/Time    Color Yellow/Straw 06/14/2022 12:30 PM    Appearance Clear 06/14/2022 12:30 PM    Specific gravity 1.011 06/14/2022 12:30 PM    pH (UA) 5.0 06/14/2022 12:30 PM    Protein Negative 06/14/2022 12:30 PM    Glucose 50 (A) 06/14/2022 12:30 PM    Ketone Negative 06/14/2022 12:30 PM    Bilirubin Negative 06/14/2022 12:30 PM    Urobilinogen 0.1 06/14/2022 12:30 PM    Nitrites Negative 06/14/2022 12:30 PM    Leukocyte Esterase Small (A) 06/14/2022 12:30 PM    Bacteria Negative 06/14/2022 12:30 PM    WBC 20-50 06/14/2022 12:30 PM    RBC 0-5 06/14/2022 12:30 PM         Assessment:   Acute respiratory failure extubated on nasal cannula   sacral decubitus ulcer postdebridement  Sepsis with leukocytosis elevated procalcitonin and CRP  Left foot wound  Recent removal of left great toe and second toe  CVA with PEG tube placement  History of aspiration pneumonia  History of chronic kidney disease  CAD with ejection fraction about 20 to 25%  Hypertension  Hyperlipidemia  Anemia of chronic disease  Hyperkalemia  Static post transfusion  Uncontrolled diabetes  Hepatic shock/improving  Coagulopathy  Elevated  Troponin  Bacteremia with coagulase-negative Staphylococcus  Sacral wound secondary to Acinetobacter  and Enterococcus  Procedure:  1. Left transmetatarsal amputation. 2.  Sacral wound excisional wound debridement 13 x 15 cm to the bone.     Hypotension off  Levophed  Hypokalemia/replace potassium  Severe protein calorie malnutrition  Hypernatremia  Plan:     Water flush 200 cc every 4 hours  Unasyn 3 g IV every 8 hours  Aspirin 81 mg daily  FiberCon 625 mg daily  Questran 4 g twice a day  Lovenox 40 subcu daily  Pepcid 20 twice daily  Ferrous sulfate 325 mg twice a day  Flucanazole 200 mg every 24 hours  Lasix 40 mg every 12 hours  Gabapentin 300 mg twice a day  Robinul 0.1 mg every 6 hours  Hydralazine 12.5 mg 3 times a day  Lantus 10 units subcu at bedtime  Lantus 50 units subcu daily  Lopressor 12.5 twice daily  Entresto 1 tab twice a day  Repeat  the labs overall prognosis very poor    Detailed discussion with the patient's daughter about poor prognosis   At bedside  laparoscopic loop colostomy for fecal diversion canceled    Monitor H&H    Patient's start fevers seen by the infectious disease  Started on IV AVYCAZ and continue flucanazole till today    Discussed with the patient daughter at the bedside  Wound vacuum in place    Overall prognosis poor    Family discussed with the hospice nurse and plan for discharge patient in hospice care from Thursday               Current Facility-Administered Medications:     anidulafungin (ERAXIS) 100 mg in 0.9% sodium chloride 130 mL IVPB, 100 mg, IntraVENous, Q24H, Minh Mensah MD    [START ON 6/16/2022] Vancomycin - Trough to be drawn prior to dose 6/16 @ 1600, , Other, ONCE, Paulino, Fernando Polanco MD    cefTAZidime-avibactam [facility]) 2.5 g in 0.9% sodium chloride 100 mL ([id number]), 2.5 g, IntraVENous, Q8H, Minh Mensah MD, 2.5 g at 06/15/22 0602    scopolamine (TRANSDERM-SCOP) 1 mg over 3 days 1 Patch, 1 Patch, TransDERmal, Q72H, Israel Soto MD, 1 Patch at 06/13/22 2144    vancomycin (VANCOCIN) 500 mg in 0.9% sodium chloride (MBP/ADV) 100 mL MBP, 500 mg, IntraVENous, Q24H, Irsael Soto MD, Last Rate: 100 mL/hr at 06/14/22 1609, 500 mg at 06/14/22 1609    calcium polycarbophiL (FIBERCON) tablet 625 mg, 1 Tablet, Oral, DAILY, Israel Soto MD, 625 mg at 06/14/22 0915    ferrous sulfate tablet 325 mg, 1 Tablet, Oral, BID, Israel Soto MD, 325 mg at 06/14/22 2047    furosemide (LASIX) injection 40 mg, 40 mg, IntraVENous, Q12H, Angelic Salinas MD, 40 mg at 06/14/22 2047    metoprolol tartrate (LOPRESSOR) tablet 12.5 mg, 12.5 mg, Per NG tube, BID, Israel Soto MD, 12.5 mg at 06/14/22 2047    alteplase (CATHFLO) injection 2 mg, 2 mg, InterCATHeter, PRN, Israel Soto MD, 2 mg at 06/03/22 1708    aspirin chewable tablet 81 mg, 81 mg, Per G Tube, DAILY, Israel Soto MD, 81 mg at 06/14/22 0915    sodium chloride (NS) flush 5-40 mL, 5-40 mL, IntraVENous, Q8H, Mynor Matthew MD, 10 mL at 06/15/22 0603    sodium chloride (NS) flush 5-40 mL, 5-40 mL, IntraVENous, PRN, Rina Goff MD, 10 mL at 05/31/22 2213    [DISCONTINUED] ondansetron (ZOFRAN ODT) tablet 4 mg, 4 mg, Oral, Q8H PRN **OR** ondansetron (ZOFRAN) injection 4 mg, 4 mg, IntraVENous, Q6H PRN, Fernando Bob MD    enoxaparin (LOVENOX) injection 40 mg, 40 mg, SubCUTAneous, DAILY, Fernando Bob MD, 40 mg at 06/14/22 0915    insulin lispro (HUMALOG) injection, , SubCUTAneous, Q6H, Fernando Bob MD, 3 Units at 06/15/22 0603    glucose chewable tablet 16 g, 4 Tablet, Oral, PRN, Fernando Bob MD    glucagon [facility]) injection 1 mg, 1 mg, IntraMUSCular, PRN, Cristian Hanley MD    hydrALAZINE (APRESOLINE) tablet 12.5 mg, 12.5 mg, Oral, TID, Jeb Bob MD, 12.5 mg at 06/14/22 2048    sacubitriL-valsartan (ENTRESTO) 24-26 mg tablet 1 Tablet, 1 Tablet, Oral, Q12H, Jeb Bob MD, 1 Tablet at 06/14/22 2047    dextrose 10% infusion 0-250 mL, 0-250 mL, IntraVENous, PRN, Jeb Bob MD, 125 mL at 05/19/22 0537    insulin glargine (LANTUS) injection 10 Units, 10 Units, SubCUTAneous, QHS, Jeb Bob MD, 10 Units at 06/14/22 2212    0.9% sodium chloride infusion 250 mL, 250 mL, IntraVENous, PRN, Jeb Bob MD, Last Rate: 15 mL/hr at 05/22/22 0820, Rate Verify at 05/22/22 0820    Vancomycin Pharmacy Dosing, , Other, Rx Dosing/Monitoring, Jeb Bob MD    acetaminophen (TYLENOL) tablet 650 mg, 650 mg, Oral, Q6H PRN, 650 mg at 06/14/22 0411 **OR** acetaminophen (TYLENOL) suppository 650 mg, 650 mg, Rectal, Q6H PRN, Jeb Bob MD, 650 mg at 05/16/22 2223    polyethylene glycol (MIRALAX) packet 17 g, 17 g, Oral, DAILY PRN, Jeb Bob MD    ondansetron (ZOFRAN ODT) tablet 4 mg, 4 mg, Oral, Q8H PRN **OR** [DISCONTINUED] ondansetron (ZOFRAN) injection 4 mg, 4 mg, IntraVENous, Q6H PRN, Jeb Bob MD    famotidine (PEPCID) tablet 20 mg, 20 mg, Oral, BID, Jeb Bob MD, 20 mg at 06/14/22 2047    acidophilus-pectin, citrus tablet 2 Tablet, 2 Tablet, Oral, DAILY, Jeb Bob MD, 2 Tablet at 06/14/22 0915    albuterol-ipratropium (DUO-NEB) 2.5 MG-0.5 MG/3 ML, 3 mL, Nebulization, Q6H PRN, Jeb Bob MD, 3 mL at 06/14/22 1336    artificial saliva (MOUTH KOTE) 1 Spray, 1 Spray, Oral, TID, Jeb Bob MD, 1 [place] at 06/14/22 2046    brimonidine (ALPHAGAN) 0.2 % ophthalmic solution 1 Drop, 1 Drop, Both Eyes, TID, Jeb Bob MD, 1 Drop at 06/14/22 2050    cholestyramine-aspartame (QUESTRAN LIGHT) packet 4 g, 4 g, Oral, BID WITH MEALS, Jeb Bob MD, 4 g at 06/14/22 1609    [Held by provider] fenofibrate nanocrystallized (TRICOR) tablet 48 mg, 48 mg, Oral, DAILY, Bert Bob MD, 48 mg at 05/19/22 9374    gabapentin (NEURONTIN) capsule 300 mg, 300 mg, Oral, BID, Paulino, Bert Lozano MD, 300 mg at 06/14/22 2047    insulin glargine (LANTUS) injection 50 Units, 50 Units, SubCUTAneous, DAILY, Paulino, Bert Lozano MD, 50 Units at 06/14/22 0914    latanoprost (XALATAN) 0.005 % ophthalmic solution 1 Drop, 1 Drop, Right Eye, QPM, Bert Bob MD, 1 Drop at 06/14/22 1819    traMADoL (ULTRAM) tablet 25 mg, 25 mg, Oral, Q12H PRN, Bert Bob MD, 25 mg at 06/12/22 1115

## 2022-06-15 NOTE — PROGRESS NOTES
Progress Note    Patient: Maddy Enriquez MRN: [id number]  SSN: [id number]    YOB: 1945  Age: 78 y.o. Sex: female      Admit Date: 5/14/2022    LOS: 32 days     Subjective:     78years old female with significant past medical history of CVA with PEG tube chronic kidney disease CVA with right-sided weakness bedbound DVT of the left great toe status post removal of the left great and second toe history of aspiration pneumonia history of sacral decubitus ulcer patient was recently discharged from the hospitalPatient discharged to nursing home upon p.o. Cipro     During last admission patient was seen by infectious disease and also seen by the vascular surgeon patient had debridement of wound during the last admission     Patient seen by the infectious disease yesterday     Sacral wound infection recent cultures growing Pseudomonas resistant to Zosyn and meropenem     5/17     Patient alert awake not in distress  Patient was seen by the vascular surgeon     Vascular surgery planned for laparoscopic loop colostomy placement and sacral debridement then wound vacuum  Also try to close the wound on the left foot with TMA     5/18     Resting in the bed not in any distress  Blood sugars running high     Seen by infectious disease continue vancomycin and start on Unasyn     5/19     And went to the OR intubated because of elevated LFT and elevated INR  Surgery was canceled     On ventilator 40% O2  Propofol drip     5/20     Patient on ventilator with 30% O2  On propofol drip     Hemoglobin dropped to 6.8     Patient's daughter at the bedside     5/20     Patient on ventilator 30% O2  On propofol drip  Getting PEG tube feeding     5/21     Patient still on ventilator 30% O2  On propofol drip  Liver Function improving     5/22  On ventilator with 30% O2 on propofol drip     5/24  Awaiting tissue culture results.    On ventilator with 30% O2 on propofol drip  Left transmetatarsal amputation and Sacral wound excisional wound debridement 13 x 15 cm to the bone completed yesterday. CXR: Hazy mid and lower lung reticular markings. Dependent small to moderate volume,  right greater than left pleural effusions  Remarkable labs   WBC: 15.2   Hgb: 8.5   Na; 148  CRP: 13.60   Procal: 0.71        5/25     Patient on ventilator with 30% O2  Seen by the vascular surgeon and plan for surgery today     5/26  Patient on ventilator with 30% O2  Hypotensive on Levophed  On propofol drip     Surgery was canceled yesterday because patient unstable hemodynamically not cleared by the anesthesia     5/27     Patient on ventilator with 30% O2  Hypotensive on Levophed  Getting PEG tube feeding     5/30      Patient on ventilator with 21% O2  On propofol drip  Off pressor     5/31     Patient on ventilator with 21% O2  On propofol drip     Off pressor  Patient have a wound vacuum        6/1     Patient scheduled for surgery today     6/2     Surgery was canceled yesterday  As overall prognosis very poor  Patient on vent on 21% O2  Getting PEG tube feeding  Wound vacuum intact        6/3     Patient still on ventilator  Seen by vascular surgeon left foot concern of ischemic changes     6/4     Patient extubated /tachypneic daughter at the bedside     6/5     Patient on BiPAP  30% O2     6/6     Patient on BiPAP 30% O2     Awake not in distress     6/7     Still on BiPAP 30% O2  Awake     6/8     Patient alert awake on BiPAP 30% O2 patient's daughter at the bedside  Still waiting for family decision regarding further treatment plan  Regarding hospice     6/9     Patient sleeping in the bed on BiPAP 30% percent O2  Wound vacuum in place     6/10     Patient off BiPAP on nasal cannula 3 L     6/11     On nasal cannula oxygen saturation 99%  Patient spiked fever 102.7 early this morning    6/14  Patient on NC oxygen saturation 99%. Patient asleep, NAD, ill-appearing.   Hospice consulted yesterday with daughter and patient and hospice agreed to admit patient under GIP, family requested transfer not occur until family can be here on Thursday. ID met with patient yesterday as well, will continue IV vancomycin and Avycaz in meantime and started on anidulafungin. Objective:     Vitals:    06/14/22 2029 06/14/22 2306 06/15/22 0615 06/15/22 0747   BP: (!) 152/65  (!) 151/66 (!) 159/64   Pulse: 72  78 81   Resp: 18  16 16   Temp: 98.4 °F (36.9 °C)  98.1 °F (36.7 °C) 98.1 °F (36.7 °C)   SpO2: 100% 100%  99%   Weight: 214 lb 11.7 oz (97.4 kg)      Height:            Review of Systems: pt not a good historian, asleep upon entering room. Physical Exam:   HENT:   Head: Normocephalic and atraumatic. Eyes: Pupils are equal, round, and reactive to light. EOM are normal.   Cardiovascular: Normal rate, regular rhythm and normal heart sounds. Pulmonary/Chest: Breath sounds normal. No wheezes. No rales. Exhibits no tenderness. Abdominal: Soft. Bowel sounds are normal. There is no abdominal tenderness. There is no rebound and no guarding. Musculoskeletal: Normal range of motion. Neurological: On ventilator   skin sacral decubitus ulcer and left foot wound right big toe amputation    Lab/Data Review:  Labs reviewed. Assessment:   Acute respiratory failure extubated on nasal cannula   sacral decubitus ulcer postdebridement  Sepsis with leukocytosis elevated procalcitonin and CRP  Left foot wound  Recent removal of left great toe and second toe  CVA with PEG tube placement  History of aspiration pneumonia  History of chronic kidney disease  CAD with ejection fraction about 20 to 25%  Hypertension  Hyperlipidemia  Anemia of chronic disease  Hyperkalemia  Static post transfusion  Uncontrolled diabetes  Hepatic shock/improving  Coagulopathy  Elevated  Troponin  Bacteremia with coagulase-negative Staphylococcus  Sacral wound secondary to Acinetobacter  and Enterococcus  Procedure:  1.  Left transmetatarsal amputation.   2.  Sacral wound excisional wound debridement 13 x 15 cm to the bone.     Hypotension off  Levophed  Hypokalemia/replace potassium  Severe protein calorie malnutrition  Hypernatremia    Plan:   Water flush 200 cc every 4 hours  Vancomycin IV per pharmacy protocol  Eraxis 200mg IV every 24 hours  Aspirin 81 mg daily  FiberCon 625 mg daily  Questran 4 g twice a day  Lovenox 40 subcu daily  Pepcid 20 twice daily  Ferrous sulfate 325 mg twice a day  Flucanazole 200 mg every 24 hours  Lasix 40 mg every 12 hours  Gabapentin 300 mg twice a day  Robinul 0.1 mg every 6 hours  Hydralazine 12.5 mg 3 times a day  Lantus 10 units subcu at bedtime  Lantus 50 units subcu daily  Lopressor 12.5 twice daily  Entresto 1 tab twice a day  Repeat  the labs     Monitor H&H     Discussed with the patient daughter at the bedside  Wound vacuum in place     Overall prognosis poor      Signed By: Jeanette Pryor     Vivien 15, 2022

## 2022-06-15 NOTE — PROGRESS NOTES
IMPRESSION:   1. Acute hypoxic respiratory failure extubated patient is now on 2 L nasal cannula  2. Pulmonary edema resolved  3. Aspiration chronically  4. Sacral decubiti ulcer with Acinetobacter and Enterococcus  5. Severe sepsis   6. Left foot wound  7. Candidal UTI  8. Shock liver resolved  9. Hypokalemia repleted  10. Symptomatic anemia stable after transfusion  11. Cardiomyopathy ejection fraction 50 to 55%      RECOMMENDATIONS/PLAN:     1. Extubated on 6/3 on nasal canula  2. Continue with Lasix  3. Drop in hemoglobin will continue to follow  4. New onset cardiomyopathy with the Lasix chest x-ray shows improvement in the pleural effusion  5. Persistent infection on Unasyn and vancomycin  6. Chest x-ray shows worsening of the congestion  7. Patient underwent on 5/23 left metatarsal amputation and sacral wound debridement  8. Severe sepsis with decubiti ulcer   9. Patient is on Unasyn  10. Chest x-ray shows mild congestive changes with fluid in the minor fissure     [x] High complexity decision making was performed  [x] See my orders for details  HPI   72-year-old lady came in because of leg pain past medical history of hypertension diabetes mellitus peripheral vascular disease CVA she is bedbound she has leg wound and also has sacral decubiti ulcer and she was scheduled for laparoscopic colostomy and sacral wound debridement and amputation of the left tarsometatarsal because of wound infection but her condition got worse her liver enzyme was elevated INR was also elevated she was intubated transferred to ICU was getting vancomycin and Unasyn started on Levophed because of low blood pressure.     PMH:  has a past medical history of Anemia, Chronic kidney disease, Diabetes mellitus ([address] Hemiplegia affecting dominant side, post-stroke ([address] (05/04/2022), HTN (hypertension), Hyperkalemia, Hyperlipidemia, Ill-defined condition, Neuropathy, PAD (peripheral artery disease) ([address] Sciatica, Stroke ([address] and UTI (urinary tract infection). PSH:   has a past surgical history that includes hx other surgical (Bilateral); hx amputation (Right); hx other surgical; hx cervical fusion; hx vascular stent (Bilateral); hx vascular stent (Bilateral); hx gi; and ir insert non tunl cvc over 5 yrs (5/27/2022). FHX: family history includes Cancer in her mother; Diabetes in her mother; Hypertension in her mother. SHX:  reports that she has never smoked. She has never used smokeless tobacco. She reports previous alcohol use. She reports that she does not use drugs.     ALL: No Known Allergies     MEDS:   [x] Reviewed - As Below   [] Not reviewed    Current Facility-Administered Medications   Medication    anidulafungin (ERAXIS) 100 mg in 0.9% sodium chloride 130 mL IVPB    [START ON 6/16/2022] Vancomycin - Trough to be drawn prior to dose 6/16 @ 1600    cefTAZidime-avibactam (AVYCAZ) 2.5 g in 0.9% sodium chloride 100 mL ([id number])    scopolamine (TRANSDERM-SCOP) 1 mg over 3 days 1 Patch    vancomycin (VANCOCIN) 500 mg in 0.9% sodium chloride (MBP/ADV) 100 mL MBP    calcium polycarbophiL (FIBERCON) tablet 625 mg    ferrous sulfate tablet 325 mg    furosemide (LASIX) injection 40 mg    metoprolol tartrate (LOPRESSOR) tablet 12.5 mg    alteplase (CATHFLO) injection 2 mg    aspirin chewable tablet 81 mg    sodium chloride (NS) flush 5-40 mL    sodium chloride (NS) flush 5-40 mL    ondansetron (ZOFRAN) injection 4 mg    enoxaparin (LOVENOX) injection 40 mg    insulin lispro (HUMALOG) injection    glucose chewable tablet 16 g    glucagon (GLUCAGEN) injection 1 mg    hydrALAZINE (APRESOLINE) tablet 12.5 mg    sacubitriL-valsartan (ENTRESTO) 24-26 mg tablet 1 Tablet    dextrose 10% infusion 0-250 mL    insulin glargine (LANTUS) injection 10 Units    0.9% sodium chloride infusion 250 mL    Vancomycin Pharmacy Dosing    acetaminophen (TYLENOL) tablet 650 mg    Or    acetaminophen (TYLENOL) suppository 650 mg    polyethylene glycol (MIRALAX) packet 17 g    ondansetron (ZOFRAN ODT) tablet 4 mg    famotidine (PEPCID) tablet 20 mg    acidophilus-pectin, citrus tablet 2 Tablet    albuterol-ipratropium (DUO-NEB) 2.5 MG-0.5 MG/3 ML    artificial saliva (MOUTH KOTE) 1 Spray    brimonidine (ALPHAGAN) 0.2 % ophthalmic solution 1 Drop    cholestyramine-aspartame (QUESTRAN LIGHT) packet 4 g    [Held by provider] fenofibrate nanocrystallized (TRICOR) tablet 48 mg    gabapentin (NEURONTIN) capsule 300 mg    insulin glargine (LANTUS) injection 50 Units    latanoprost (XALATAN) 0.005 % ophthalmic solution 1 Drop    traMADoL (ULTRAM) tablet 25 mg      MAR reviewed and pertinent medications noted or modified as needed   Current Facility-Administered Medications   Medication    anidulafungin (ERAXIS) 100 mg in 0.9% sodium chloride 130 mL IVPB    [START ON 6/16/2022] Vancomycin - Trough to be drawn prior to dose 6/16 @ 1600    cefTAZidime-avibactam (AVYCAZ) 2.5 g in 0.9% sodium chloride 100 mL ([id number])    scopolamine (TRANSDERM-SCOP) 1 mg over 3 days 1 Patch    vancomycin (VANCOCIN) 500 mg in 0.9% sodium chloride (MBP/ADV) 100 mL MBP    calcium polycarbophiL (FIBERCON) tablet 625 mg    ferrous sulfate tablet 325 mg    furosemide (LASIX) injection 40 mg    metoprolol tartrate (LOPRESSOR) tablet 12.5 mg    alteplase (CATHFLO) injection 2 mg    aspirin chewable tablet 81 mg    sodium chloride (NS) flush 5-40 mL    sodium chloride (NS) flush 5-40 mL    ondansetron (ZOFRAN) injection 4 mg    enoxaparin (LOVENOX) injection 40 mg    insulin lispro (HUMALOG) injection    glucose chewable tablet 16 g    glucagon (GLUCAGEN) injection 1 mg    hydrALAZINE (APRESOLINE) tablet 12.5 mg    sacubitriL-valsartan (ENTRESTO) 24-26 mg tablet 1 Tablet    dextrose 10% infusion 0-250 mL    insulin glargine (LANTUS) injection 10 Units    0.9% sodium chloride infusion 250 mL    Vancomycin Pharmacy Dosing    acetaminophen (TYLENOL) tablet 650 mg    Or    acetaminophen (TYLENOL) suppository 650 mg    polyethylene glycol (MIRALAX) packet 17 g    ondansetron (ZOFRAN ODT) tablet 4 mg    famotidine (PEPCID) tablet 20 mg    acidophilus-pectin, citrus tablet 2 Tablet    albuterol-ipratropium (DUO-NEB) 2.5 MG-0.5 MG/3 ML    artificial saliva (MOUTH KOTE) 1 Spray    brimonidine (ALPHAGAN) 0.2 % ophthalmic solution 1 Drop    cholestyramine-aspartame (QUESTRAN LIGHT) packet 4 g    [Held by provider] fenofibrate nanocrystallized (TRICOR) tablet 48 mg    gabapentin (NEURONTIN) capsule 300 mg    insulin glargine (LANTUS) injection 50 Units    latanoprost (XALATAN) 0.005 % ophthalmic solution 1 Drop    traMADoL (ULTRAM) tablet 25 mg      PMH:  has a past medical history of Anemia, Chronic kidney disease, Diabetes mellitus ([facility] [address] Hemiplegia affecting dominant side, post-stroke ([facility] [address] (05/04/2022), HTN (hypertension), Hyperkalemia, Hyperlipidemia, Ill-defined condition, Neuropathy, PAD (peripheral artery disease) ([facility] [address] Sciatica, Stroke ([address] and UTI (urinary tract infection). PSH:   has a past surgical history that includes hx other surgical (Bilateral); hx amputation (Right); hx other surgical; hx cervical fusion; hx vascular stent (Bilateral); hx vascular stent (Bilateral); hx gi; and ir insert non tunl cvc over 5 yrs (5/27/2022). FHX: family history includes Cancer in her mother; Diabetes in her mother; Hypertension in her mother. SHX:  reports that she has never smoked. She has never used smokeless tobacco. She reports previous alcohol use. She reports that she does not use drugs. ROS:  Unable to obtain    Hemodynamics:    CO:    CI:    CVP:    SVR:   PAP Systolic:    PAP Diastolic:    PVR:    DS80:        Ventilator Settings:      Mode Rate TV Press PEEP FiO2 PIP Min.  Vent   Pressure support,Spontaneous    500 ml 15 cm H2O  5 cm H20 28 %  27 cm H2O  13.2 l/min        Vital Signs: Telemetry:    normal sinus rhythm Intake/Output:   Visit Vitals  BP (!) 159/64   Pulse 81   Temp 98.1 °F (36.7 °C)   Resp 16   Ht 5' 6.93\" (1.7 m)   Wt 97.4 kg (214 lb 11.7 oz)   SpO2 99%   Breastfeeding No   BMI 33.70 kg/m²       Temp (24hrs), Av.4 °F (36.9 °C), Min:98.1 °F (36.7 °C), Max:99 °F (37.2 °C)        O2 Device: Nasal cannula O2 Flow Rate (L/min): 2 l/min       Wt Readings from Last 4 Encounters:   22 97.4 kg (214 lb 11.7 oz)   04/10/22 86.6 kg (191 lb)   22 82.2 kg (181 lb 3.5 oz)   20 84.4 kg (186 lb)          Intake/Output Summary (Last 24 hours) at 6/15/2022 0924  Last data filed at 6/15/2022 0459  Gross per 24 hour   Intake 2900 ml   Output 3350 ml   Net -450 ml       Last shift:      No intake/output data recorded. Last 3 shifts:  1901 - 06/15 0700  In: 5120 [I.V.:900]  Out: 3350 [Urine:3000; Drains:350]       Physical Exam:     General: Open her eyes follows no commands  HEENT: NCAT,  Eyes: anicteric; conjunctiva clear, random eye movement when eyelids are open  Neck: no nodes, , trach midline; no accessory MM use. Neck veins obscured by obesity but seems slightly engorged  Chest: no deformity,   Cardiac: R regular; no murmur;   Lungs: Shallow breaths with diffuse wheezes and rhonchi  Abd: soft, NT, hypoactive BS  Ext: Diffuse edema; no joint swelling;  No clubbing  :clear urine  Neuro: Seems to weakly try to open her eyes follows no commands does not speak does not move her extremities  Psych-  unable to assess  Skin: warm, dry, no cyanosis;   Pulses: Brachial and radial pulses intact  Capillary: Normal capillary refill      DATA:    MAR reviewed and pertinent medications noted or modified as needed  MEDS:   Current Facility-Administered Medications   Medication    anidulafungin (ERAXIS) 100 mg in 0.9% sodium chloride 130 mL IVPB    [START ON 2022] Vancomycin - Trough to be drawn prior to dose  @ 1600    cefTAZidime-avibactam (AVYCAZ) 2.5 g in 0.9% sodium chloride 100 mL ([id number])    scopolamine (TRANSDERM-SCOP) 1 mg over 3 days 1 Patch    vancomycin (VANCOCIN) 500 mg in 0.9% sodium chloride (MBP/ADV) 100 mL MBP    calcium polycarbophiL (FIBERCON) tablet 625 mg    ferrous sulfate tablet 325 mg    furosemide (LASIX) injection 40 mg    metoprolol tartrate (LOPRESSOR) tablet 12.5 mg    alteplase (CATHFLO) injection 2 mg    aspirin chewable tablet 81 mg    sodium chloride (NS) flush 5-40 mL    sodium chloride (NS) flush 5-40 mL    ondansetron (ZOFRAN) injection 4 mg    enoxaparin (LOVENOX) injection 40 mg    insulin lispro (HUMALOG) injection    glucose chewable tablet 16 g    glucagon (GLUCAGEN) injection 1 mg    hydrALAZINE (APRESOLINE) tablet 12.5 mg    sacubitriL-valsartan (ENTRESTO) 24-26 mg tablet 1 Tablet    dextrose 10% infusion 0-250 mL    insulin glargine (LANTUS) injection 10 Units    0.9% sodium chloride infusion 250 mL    Vancomycin Pharmacy Dosing    acetaminophen (TYLENOL) tablet 650 mg    Or    acetaminophen (TYLENOL) suppository 650 mg    polyethylene glycol (MIRALAX) packet 17 g    ondansetron (ZOFRAN ODT) tablet 4 mg    famotidine (PEPCID) tablet 20 mg    acidophilus-pectin, citrus tablet 2 Tablet    albuterol-ipratropium (DUO-NEB) 2.5 MG-0.5 MG/3 ML    artificial saliva (MOUTH KOTE) 1 Spray    brimonidine (ALPHAGAN) 0.2 % ophthalmic solution 1 Drop    cholestyramine-aspartame (QUESTRAN LIGHT) packet 4 g    [Held by provider] fenofibrate nanocrystallized (TRICOR) tablet 48 mg    gabapentin (NEURONTIN) capsule 300 mg    insulin glargine (LANTUS) injection 50 Units    latanoprost (XALATAN) 0.005 % ophthalmic solution 1 Drop    traMADoL (ULTRAM) tablet 25 mg        Labs:    Recent Labs     06/15/22  0540 06/14/22  0547 06/13/22  0823   WBC 15.7* 14.8* 15.1*   HGB 9.8* 9.0* 9.4*    363 383   INR 1.6* 1.6* 1.6*     Recent Labs     06/15/22  0540 06/14/22  1543 06/14/22  0547 06/13/22  0823 06/13/22  0823   *  --  154*  --  153*   K 3.8 --  3.9  --  3.6   *  --  121*  --  122*   CO2 27  --  27  --  25   *  --  279*  --  206*   BUN 31*  --  35*  --  34*   CREA 0.77 0.89 0.91   < > 0.90   CA 9.0  --  8.9  --  9.1   MG 2.1  --   --   --  2.2   PHOS 2.2*  --   --   --  3.0   ALB  --   --  1.9*  --  2.0*   ALT  --   --  26  --  32    < > = values in this interval not displayed. No results for input(s): PH, PCO2, PO2, HCO3, FIO2 in the last 72 hours. 6/ and IV IP 16 EP 5 rate 16 FiO2 30%    AC 12  PEEP 5 FiO2 30%   echo ejection fraction 20% mild mitral regurg left atrium 3.5 cm RVSP 35  , 1313, 1361    Lab Results   Component Value Date/Time    Culture result: No growth after 7 hours 2022 08:24 PM    Culture result: Heavy  Mixed skin yasmine isolated   2022 06:15 PM    Culture result: Rare Normal respiratory yasmine 2022 02:48 PM     Lab Results   Component Value Date/Time    TSH 2.880 2016 10:13 AM        Imagin/22 chest x-ray with ET tube in place hazy accentuated basilar markings with small amount of fluid in the minor fissure  Results from Hospital Encounter encounter on 22    XR CHEST PORT    Narrative  Semiupright portable radiograph chest 4:32 p.m. compared to Vivien 10, 2022. INDICATION: New fever. Heart size remains enlarged. Shallow depth of inspiration. There is increasing  airspace disease or atelectasis in the right upper lobe abutting the minor  fissure overall decreasing interstitial markings throughout both lungs. Cannot  exclude a small left pleural effusion. No pneumothorax. No acute bony findings. Impression  1. Shallow depth of inspiration with improving interstitial markings but new  area of atelectasis or infiltrate in the right upper lobe adjacent to the minor  fissure.       Results from [place] encounter on 22    CT ABD PELV WO CONT    Narrative  CT ABD PELV WO CONT    Technique: Noncontrasted CT scan of the abdomen and pelvis was performed  utilizing transaxial images obtained from the dome of the diaphragm to the pubic  symphysis. Coronal and sagittal reconstructions were generated. Dose Reduction:  All CT scans at this facility are performed using dose reduction optimization  techniques as appropriate to a performed exam including the following: Automated  exposure control, adjustments of the mA and/or kV according to patient size, or  use of iterative reconstruction technique. Comparison:  2/25/2022. Findings:  Small-moderate pleural effusions are present with bibasilar  atelectasis. Atherosclerotic calcifications are again evident including coronary  artery calcifications. Gastrostomy tube tip in the gastric antrum. The liver, spleen, pancreas, and  adrenal glands are unremarkable for noncontrasted technique. Stable lobulated  kidneys. Negative for hydronephrosis. Gallbladder is unremarkable. No biliary  duct dilatation apparent. The abdominal aorta is normal in caliber. There is no evidence of bowel obstruction. Small volume ascites has developed. The urinary bladder is decompressed by a Bell catheter. The uterus is  unremarkable. The appendix is normal.    There is mild body wall edema. No suspicious lytic or blastic lesion evident. Impression  Third spacing of fluids with small volume ascites, small-moderate  pleural effusions, and mild body wall edema. Bibasilar atelectasis. 5/20 intubated on ventilator we will continue current vent settings patient is supposed to go for surgery because of transaminitis elevated liver enzyme we will wait as per surgeon for sacral wound debridement and diverting loop colostomy off Levophed, improvement in AST and ALT  5/21 continue with the current vent settings ABG acceptable liver enzyme improving awaiting for the surgery  5/22 maintain ventilator as is in anticipation of surgery.   Renal function improved mild congestion on chest x-ray we will give 1 dose Lasix and potassium  5/23 patient is going for surgery today we will leave ventilator as it is INR is 1.5  5/25 patient remained on ventilator intubated doing well, patient received vitamin K fresh frozen plasma schedule for laparoscopic loop colostomy possible today  5/26 no surgery has been plans will start weaning do sedation vacation  5/27 try to do sedation vacation to wean patient blood pressure dropped still on Levophed we will continue to wean discussed with the family at bedside  5/28 remains on ventilator sedated and also on Levophed possible surgery next week  5/29 on ventilator ET tube advisement 1 to 1.5 cm we will repeat chest x-ray ABG acceptable  5/30 continue with current vent setting  5/31 awaiting for possible surgery if not we will start weaning  6/1 remain intubated on ventilator surgery has been delayed we will start weaning and plan for extubation  6/2 no surgery was planned so we will try to wean she is off Levophed we will do sedation vacation and if weaning cardio acceptable will extubate  6/3 change vent settings to spontaneous if weaning creatinine acceptable will extubate  6/4 extubated on 6/3 now on nasal cannula tolerating well  6/5 respiratory distress last night placed on noninvasive ventilator chest x-ray continues to show fluid overload. Will increase Lasix to 40 mg twice daily give albumin today. Likely has continued aspiration. Will increase Robinul to every 6 hours dosing  6/6 patient is alert awake he is full code we will try to wean BiPAP put on nasal cannula and keep BiPAP at night  6/7 noninvasive ventilator Machine we will change it to high flow nasal cannula  6/8 on noninvasive ventilator BiPAP machine chest x-ray shows improvement overall condition prognosis poor  6/10 transition from BiPAP to nasal cannula.   3 L ABG acceptable

## 2022-06-16 NOTE — PROGRESS NOTES
Problem: Hospice Orientation  Goal: Demonstrate understanding of hospice philosophy, plan of care, and home hospice program  Description: The patient/family/caregiver will demonstrate understanding of hospice philosophy, plan of care and the home hospice program as evidenced by participation in meeting the patient's psychosocial, spiritual, medical, and physical needs inclusive of medical supplies/equipment focusing on symptoms. Outcome: Progressing Towards Goal     Problem: Pain  Goal: *Control of acute pain  Outcome: Progressing Towards Goal     Problem: Breathing Pattern - Ineffective  Goal: *Use of effective breathing techniques  Outcome: Progressing Towards Goal     Problem: General Wound Care  Goal: *Infected Wound: Prevention of further infection  Description: Infection control procedures (eg: clean dressings, clean gloves, hand washing, precautions to isolate wound from contamination, sterile instruments used for wound debridement) should be implemented.   Outcome: Progressing Towards Goal  Goal: Interventions  Outcome: Progressing Towards Goal     Problem: Discharge Planning  Goal: *Participates in discharge planning  Outcome: Progressing Towards Goal

## 2022-06-16 NOTE — PROGRESS NOTES
Patient has transitioned to inpatient hospice with [facility].     [address], [address]

## 2022-06-16 NOTE — PROGRESS NOTES
General Daily Progress Note          Patient Name:   James Watts       YOB: 1945       Age:  78 y.o. Admit Date: 5/14/2022      Subjective:     78years old female with significant past medical history of CVA with PEG tube chronic kidney disease CVA with right-sided weakness bedbound DVT of the left great toe status post removal of the left great and second toe history of aspiration pneumonia history of sacral decubitus ulcer patient was recently discharged from the hospitalPatient discharged to nursing home upon p.o. Cipro    Admitted again yesterday concerning for worsening of sacral ulcer    During last admission patient was seen by infectious disease and also seen by the vascular surgeon patient had debridement of wound during the last admission    Patient seen by the infectious disease yesterday    Sacral wound infection recent cultures growing Pseudomonas resistant to Zosyn and meropenem    5/17    Patient alert awake not in distress  Patient was seen by the vascular surgeon    Vascular surgery planned for laparoscopic loop colostomy placement and sacral debridement then wound vacuum  Also try to close the wound on the left foot with TMA    5/18    Resting in the bed not in any distress  Blood sugars running high    Seen by infectious disease continue vancomycin and start on Unasyn    5/19    And went to the OR intubated because of elevated LFT and elevated INR  Surgery was canceled    On ventilator 40% O2  Propofol drip    5/20    Patient on ventilator with 30% O2  On propofol drip    Hemoglobin dropped to 6.8    Patient's daughter at the bedside    5/20    Patient on ventilator 30% O2  On propofol drip  Getting PEG tube feeding    5/21    Patient still on ventilator 30% O2  On propofol drip  Liver Function improving    5/22  On ventilator with 30% O2 on propofol drip    5/24  Awaiting tissue culture results.    On ventilator with 30% O2 on propofol drip  Left transmetatarsal amputation and Sacral wound excisional wound debridement 13 x 15 cm to the bone completed yesterday. CXR: Hazy mid and lower lung reticular markings.  Dependent small to moderate volume,  right greater than left pleural effusions  Remarkable labs   WBC: 15.2   Hgb: 8.5   Na; 148  CRP: 13.60   Procal: 0.71      5/25    Patient on ventilator with 30% O2  Seen by the vascular surgeon and plan for surgery today    5/26  Patient on ventilator with 30% O2  Hypotensive on Levophed  On propofol drip    Surgery was canceled yesterday because patient unstable hemodynamically not cleared by the anesthesia    5/27    Patient on ventilator with 30% O2  Hypotensive on Levophed  Getting PEG tube feeding    5/30     Patient on ventilator with 21% O2  On propofol drip  Off pressor    5/31    Patient on ventilator with 21% O2  On propofol drip    Off pressor  Patient have a wound vacuum      6/1    Patient scheduled for surgery today    6/2    Surgery was canceled yesterday  As overall prognosis very poor  Patient on vent on 21% O2  Getting PEG tube feeding  Wound vacuum intact      6/3    Patient still on ventilator  Seen by vascular surgeon left foot concern of ischemic changes    6/4    Patient extubated /tachypneic daughter at the bedside    6/5    Patient on BiPAP  30% O2    6/6    Patient on BiPAP 30% O2    Awake not in distress    6/7    Still on BiPAP 30% O2  Awake    6/8    Patient alert awake on BiPAP 30% O2 patient's daughter at the bedside  Still waiting for family decision regarding further treatment plan  Regarding hospice    6/9    Patient sleeping in the bed on BiPAP 30% percent O2  Wound vacuum in place    6/10    Patient off BiPAP on nasal cannula 3 L    6/11    On nasal cannula oxygen saturation 99%  Patient spiked fever 102.7 early this morning          Objective:     Visit Vitals  BP (!) 149/57 (BP 1 Location: Right upper arm, BP Patient Position: At rest)   Pulse 77   Temp 98.9 °F (37.2 °C)   Resp 20   Ht 5' 6.93\" (1.7 m)   Wt 97.4 kg (214 lb 11.7 oz)   SpO2 100%   Breastfeeding No   BMI 33.70 kg/m²        Recent Results (from the past 24 hour(s))   GLUCOSE, POC    Collection Time: 06/15/22 11:31 AM   Result Value Ref Range    Glucose (POC) 302 (H) 65 - 117 mg/dL    Performed by Poly Wilcox, POC    Collection Time: 06/15/22  5:24 PM   Result Value Ref Range    Glucose (POC) 308 (H) 65 - 117 mg/dL    Performed by Poly Wilcox, POC    Collection Time: 06/15/22  8:49 PM   Result Value Ref Range    Glucose (POC) 321 (H) 65 - 117 mg/dL    Performed by Logan Salvador    GLUCOSE, POC    Collection Time: 06/15/22 11:28 PM   Result Value Ref Range    Glucose (POC) 347 (H) 65 - 117 mg/dL    Performed by Dru Salamanca    PROTHROMBIN TIME + INR    Collection Time: 06/16/22  4:20 AM   Result Value Ref Range    Prothrombin time 17.6 (H) 11.9 - 14.6 sec    INR 1.4 (H) 0.9 - 1.1     GLUCOSE, POC    Collection Time: 06/16/22  6:16 AM   Result Value Ref Range    Glucose (POC) 278 (H) 65 - 117 mg/dL    Performed by Dru Salamanca      [unfilled]      Review of Systems    Not a good historian e. Physical Exam:      Constitutional: On ventilator  HENT:   Head: Normocephalic and atraumatic. Eyes: Pupils are equal, round, and reactive to light. EOM are normal.   Cardiovascular: Normal rate, regular rhythm and normal heart sounds. Pulmonary/Chest: Breath sounds normal. No wheezes. No rales. Exhibits no tenderness. Abdominal: Soft. Bowel sounds are normal. There is no abdominal tenderness. There is no rebound and no guarding. Musculoskeletal: Normal range of motion. Neurological: On ventilator   skin sacral decubitus ulcer and left foot wound right big toe amputation    XR CHEST PORT   Final Result   1. Shallow depth of inspiration with improving interstitial markings but new   area of atelectasis or infiltrate in the right upper lobe adjacent to the minor   fissure.       XR CHEST PORT   Final Result XR CHEST PORT   Final Result   1. There has been an improvement in the pulmonary edema pattern particularly   within the left perihilar region. There is persistent milder interstitial edema   within the right lung. 2.  This examination is negative for new pulmonary pathology or additional   interval changes. XR CHEST PORT   Final Result      XR CHEST PORT   Final Result   Pulmonary vascular congestion and predominantly perihilar opacities,   increased. XR CHEST PORT   Final Result   Worsening lung aeration. XR CHEST PORT   Final Result   No significant change. XR CHEST PORT   Final Result   No significant change. XR CHEST PORT   Final Result      XR CHEST PORT   Final Result      XR CHEST PORT   Final Result   No interval change or acute process. XR CHEST PORT   Final Result   ET tube retracted from previous, with tip now at the level of the   thoracic inlet, 8-9 cm above the jennifer. Stable appearance of right central   line. Stable mild enlargement of cardiopericardial silhouette. No evidence of   pulmonary edema, air space pneumonia, or pleural effusion. No evidence of   pneumothorax. Some structures are partially obscured by overlying monitoring   electrodes. XR CHEST PORT   Final Result   Basilar airspace disease, possibly subsegmental atelectasis, stable. XR CHEST PORT   Final Result   Cardiomegaly with vascular congestion. Stable appearance of ET tube. Right central line placed, without radiographic evidence of complication. IR INSERT NON TUNL CVC OVER 5 YRS   Final Result   Successful placement of a triple-lumen central venous catheter. The   catheter is ready for use. XR CHEST PORT   Final Result      XR CHEST PORT   Final Result      XR CHEST PORT   Final Result      CT ABD PELV WO CONT   Final Result   Third spacing of fluids with small volume ascites, small-moderate   pleural effusions, and mild body wall edema. Bibasilar atelectasis.       XR CHEST PORT   Final Result   Similar findings compared to single view chest 1 day earlier. XR CHEST PORT   Final Result   Findings/IMPRESSION: Stable appearance of endotracheal tube. Stable mild   enlargement of cardiomediastinal silhouette. Central vascular congestion with   bilateral perihilar and basilar opacities, consistent with edema and/or   pneumonia, right greater than left. Possible right pleural effusion. No   pneumothorax. XR CHEST PORT   Final Result   Bibasilar opacities, possibly increased on the right. XR CHEST PORT   Final Result   No significant change allowing for difference in technique and   positioning. Single portable AP view compared to yesterday. Suboptimal inspiratory film   compromises visualization of the lower lung fields. Monitoring equipment   overlies the body. The tip of the tube is approximately 3 cm above the jennifer. Mild apparent cardiomegaly. Left heart border and left diaphragm obscured. Hazy airspace opacification both lung bases may be a combination of atelectasis,   pneumonia, or edema. No large effusion. Negative for pneumothorax. XR CHEST PORT   Final Result   No significant change allowing for difference in technique and   positioning. Single portable AP view compared to yesterday. Rotation to the right. Monitoring   equipment overlies the body. Suboptimal inspiratory film compromises   visualization of the lower lung fields. Mild cardiomegaly. The tip of the ET tube is approximately 3 cm above the jennifer. Mild basal interstitial edema. No new consolidation. No pleural effusion or   pneumothorax. XR CHEST PORT   Final Result   1. The endotracheal tube is in satisfactory position. 2.  There has been a partial interval resolution in the pulmonary interstitial   edema pattern.       XR CHEST PORT   Final Result   Ill-defined haziness in the mid to lower lung zones suspect for mild   atelectatic changes and/or interstitial fluid or pleural fluid. US ABD LTD   Final Result   1. Question pericholecystic fluid. No identified gallstones or sludge. 2. Right pleural effusion. 3. Increased right renal echotexture for medical renal disease. XR CHEST PORT   Final Result   Intubation. Findings suggesting hydrostatic edema. XR CHEST PORT   Final Result   No evidence of an acute cardiopulmonary process.       IR FLUORO GUIDE PLC CVAD    (Results Pending)   IR US GUIDED VASCULAR ACCESS    (Results Pending)        Recent Results (from the past 24 hour(s))   GLUCOSE, POC    Collection Time: 06/15/22 11:31 AM   Result Value Ref Range    Glucose (POC) 302 (H) 65 - 117 mg/dL    Performed by Juan Bruner, POC    Collection Time: 06/15/22  5:24 PM   Result Value Ref Range    Glucose (POC) 308 (H) 65 - 117 mg/dL    Performed by Juan Bruner, POC    Collection Time: 06/15/22  8:49 PM   Result Value Ref Range    Glucose (POC) 321 (H) 65 - 117 mg/dL    Performed by Juan M Norris    GLUCOSE, POC    Collection Time: 06/15/22 11:28 PM   Result Value Ref Range    Glucose (POC) 347 (H) 65 - 117 mg/dL    Performed by Chepe Ramirez    PROTHROMBIN TIME + INR    Collection Time: 06/16/22  4:20 AM   Result Value Ref Range    Prothrombin time 17.6 (H) 11.9 - 14.6 sec    INR 1.4 (H) 0.9 - 1.1     GLUCOSE, POC    Collection Time: 06/16/22  6:16 AM   Result Value Ref Range    Glucose (POC) 278 (H) 65 - 117 mg/dL    Performed by Chepe Ramirez        Results     Procedure Component Value Units Date/Time    CULTURE, URINE [[id number]] Collected: 06/14/22 1230    Order Status: Completed Specimen: Urine Updated: 06/14/22 1302    CULTURE, BLOOD [[id number]] Collected: 06/13/22 2024    Order Status: Completed Specimen: Blood Updated: 06/14/22 1342     Special Requests: --        No Special Requests  Right       Culture result: No growth after 7 hours              Labs:     Recent Labs     06/15/22  0540 06/14/22  0547   WBC 15.7* 14.8* HGB 9.8* 9.0*   HCT 33.4* 29.8*    363     Recent Labs     06/15/22  0540 06/14/22  1543 06/14/22  0547   *  --  154*   K 3.8  --  3.9   *  --  121*   CO2 27  --  27   BUN 31*  --  35*   CREA 0.77 0.89 0.91   *  --  279*   CA 9.0  --  8.9   MG 2.1  --   --    PHOS 2.2*  --   --      Recent Labs     06/14/22  0547   ALT 26      TBILI 0.4   TP 6.8   ALB 1.9*   GLOB 4.9*     Recent Labs     06/16/22  0420 06/15/22  0540 06/14/22  0547   INR 1.4* 1.6* 1.6*   PTP 17.6* 18.7* 18.7*      No results for input(s): FE, TIBC, PSAT, FERR in the last 72 hours. No results found for: FOL, RBCF   No results for input(s): PH, PCO2, PO2 in the last 72 hours. No results for input(s): CPK, CKNDX, TROIQ in the last 72 hours.     No lab exists for component: CPKMB  Lab Results   Component Value Date/Time    Cholesterol, total 124 03/08/2022 07:40 AM    HDL Cholesterol 30 03/08/2022 07:40 AM    LDL,Direct 79 06/28/2021 12:00 AM    LDL, calculated 44.8 03/08/2022 07:40 AM    Triglyceride 202 (H) 05/16/2022 06:20 AM    CHOL/HDL Ratio 4.1 03/08/2022 07:40 AM     Lab Results   Component Value Date/Time    Glucose (POC) 278 (H) 06/16/2022 06:16 AM    Glucose (POC) 347 (H) 06/15/2022 11:28 PM    Glucose (POC) 321 (H) 06/15/2022 08:49 PM    Glucose (POC) 308 (H) 06/15/2022 05:24 PM    Glucose (POC) 302 (H) 06/15/2022 11:31 AM     Lab Results   Component Value Date/Time    Color Yellow/Straw 06/14/2022 12:30 PM    Appearance Clear 06/14/2022 12:30 PM    Specific gravity 1.011 06/14/2022 12:30 PM    pH (UA) 5.0 06/14/2022 12:30 PM    Protein Negative 06/14/2022 12:30 PM    Glucose 50 (A) 06/14/2022 12:30 PM    Ketone Negative 06/14/2022 12:30 PM    Bilirubin Negative 06/14/2022 12:30 PM    Urobilinogen 0.1 06/14/2022 12:30 PM    Nitrites Negative 06/14/2022 12:30 PM    Leukocyte Esterase Small (A) 06/14/2022 12:30 PM    Bacteria Negative 06/14/2022 12:30 PM    WBC 20-50 06/14/2022 12:30 PM    RBC 0-5 06/14/2022 12:30 PM         Assessment:   Acute respiratory failure extubated on nasal cannula   sacral decubitus ulcer postdebridement  Sepsis with leukocytosis elevated procalcitonin and CRP  Left foot wound  Recent removal of left great toe and second toe  CVA with PEG tube placement  History of aspiration pneumonia  History of chronic kidney disease  CAD with ejection fraction about 20 to 25%  Hypertension  Hyperlipidemia  Anemia of chronic disease  Hyperkalemia  Static post transfusion  Uncontrolled diabetes  Hepatic shock/improving  Coagulopathy  Elevated  Troponin  Bacteremia with coagulase-negative Staphylococcus  Sacral wound secondary to Acinetobacter  and Enterococcus  Procedure:  1. Left transmetatarsal amputation. 2.  Sacral wound excisional wound debridement 13 x 15 cm to the bone.     Hypotension off  Levophed  Hypokalemia/replace potassium  Severe protein calorie malnutrition  Hypernatremia  Plan:     Water flush 200 cc every 4 hours  Unasyn 3 g IV every 8 hours  Aspirin 81 mg daily  FiberCon 625 mg daily  Questran 4 g twice a day  Lovenox 40 subcu daily  Pepcid 20 twice daily  Ferrous sulfate 325 mg twice a day  Flucanazole 200 mg every 24 hours  Lasix 40 mg every 12 hours  Gabapentin 300 mg twice a day  Robinul 0.1 mg every 6 hours  Hydralazine 12.5 mg 3 times a day  Lantus 10 units subcu at bedtime  Lantus 50 units subcu daily  Lopressor 12.5 twice daily  Entresto 1 tab twice a day  Repeat  the labs overall prognosis very poor    Detailed discussion with the patient's daughter about poor prognosis   At bedside  laparoscopic loop colostomy for fecal diversion canceled    Monitor H&H    Patient's start fevers seen by the infectious disease  Started on IV AVYCAZ and continue flucanazole till today    Discussed with the patient daughter at the bedside  Wound vacuum in place    Overall prognosis poor    Family discussed with the hospice nurse and plan for discharge patient in hospice care from Thursday    Discussed with the patient family at the bedside waiting for hospice meeting and discharge patient to hospice service               Current Facility-Administered Medications:     anidulafungin (ERAXIS) 100 mg in 0.9% sodium chloride 130 mL IVPB, 100 mg, IntraVENous, Q24H, Kamron Bee MD, Last Rate: 83 mL/hr at 06/15/22 1742, 100 mg at 06/15/22 1742    Vancomycin - Trough to be drawn prior to dose 6/16 @ 1600, , Other, ONCE, Paulino, Gina Nurse, MD    cefTAZidime-avibactam [facility]) 2.5 g in 0.9% sodium chloride 100 mL ([id number]), 2.5 g, IntraVENous, Q8H, Kamron Bee MD, 2.5 g at 06/16/22 0530    scopolamine (TRANSDERM-SCOP) 1 mg over 3 days 1 Patch, 1 Patch, TransDERmal, Q72H, Israel Soto MD, 1 Patch at 06/13/22 2144    vancomycin (VANCOCIN) 500 mg in 0.9% sodium chloride (MBP/ADV) 100 mL MBP, 500 mg, IntraVENous, Q24H, Israel Soto MD, Last Rate: 100 mL/hr at 06/15/22 1607, 500 mg at 06/15/22 1607    calcium polycarbophiL (FIBERCON) tablet 625 mg, 1 Tablet, Oral, DAILY, Israel Soto MD, 625 mg at 06/15/22 4866    ferrous sulfate tablet 325 mg, 1 Tablet, Oral, BID, Israel Soto MD, 325 mg at 06/15/22 2104    furosemide (LASIX) injection 40 mg, 40 mg, IntraVENous, Q12H, Ciro Land MD, 40 mg at 06/15/22 2103    metoprolol tartrate (LOPRESSOR) tablet 12.5 mg, 12.5 mg, Per NG tube, BID, Israel Soto MD, 12.5 mg at 06/15/22 2104    alteplase (CATHFLO) injection 2 mg, 2 mg, InterCATHeter, PRN, Wojciech Scruggs MD, 2 mg at 06/03/22 1708    aspirin chewable tablet 81 mg, 81 mg, Per G Tube, DAILY, Israel Soto MD, 81 mg at 06/15/22 0953    sodium chloride (NS) flush 5-40 mL, 5-40 mL, IntraVENous, Q8H, Eros Matthew MD, 10 mL at 06/16/22 0530    sodium chloride (NS) flush 5-40 mL, 5-40 mL, IntraVENous, PRN, Pranav Benedict MD, 10 mL at 05/31/22 6897    [DISCONTINUED] ondansetron (ZOFRAN ODT) tablet 4 mg, 4 mg, Oral, Q8H PRN **OR** ondansetron (ZOFRAN) injection 4 mg, 4 mg, IntraVENous, Q6H PRN, Jeanmarie Bob MD    enoxaparin (LOVENOX) injection 40 mg, 40 mg, SubCUTAneous, DAILY, Jeanmarie Bob MD, 40 mg at 06/15/22 0953    insulin lispro (HUMALOG) injection, , SubCUTAneous, Q6H, Jeanmarie Bob MD, 3 Units at 06/16/22 0600    glucose chewable tablet 16 g, 4 Tablet, Oral, PRN, Jeanmarie Bob MD    glucagon [facility]) injection 1 mg, 1 mg, IntraMUSCular, PRN, Jeanmarie Bob MD    hydrALAZINE (APRESOLINE) tablet 12.5 mg, 12.5 mg, Oral, TID, PaulinoJeanmarie MD, 12.5 mg at 06/15/22 2103    sacubitriL-valsartan (ENTRESTO) 24-26 mg tablet 1 Tablet, 1 Tablet, Oral, Q12H, Jeanmarie Bob MD, 1 Tablet at 06/15/22 2103    dextrose 10% infusion 0-250 mL, 0-250 mL, IntraVENous, PRN, Jeanmarie Bob MD, 125 mL at 05/19/22 0537    insulin glargine (LANTUS) injection 10 Units, 10 Units, SubCUTAneous, QHS, Jeanmarie Bob MD, 10 Units at 06/15/22 2200    0.9% sodium chloride infusion 250 mL, 250 mL, IntraVENous, PRN, Jeanmarie Bob MD, Last Rate: 15 mL/hr at 05/22/22 0820, Rate Verify at 05/22/22 0820    Vancomycin Pharmacy Dosing, , Other, Rx Dosing/Monitoring, Jeanmarie Bob MD    acetaminophen (TYLENOL) tablet 650 mg, 650 mg, Oral, Q6H PRN, 650 mg at 06/14/22 0411 **OR** acetaminophen (TYLENOL) suppository 650 mg, 650 mg, Rectal, Q6H PRN, Jeanmarie Bob MD, 650 mg at 05/16/22 2223    polyethylene glycol (MIRALAX) packet 17 g, 17 g, Oral, DAILY PRN, Jeanmarie Bob MD    ondansetron (ZOFRAN ODT) tablet 4 mg, 4 mg, Oral, Q8H PRN **OR** [DISCONTINUED] ondansetron (ZOFRAN) injection 4 mg, 4 mg, IntraVENous, Q6H PRN, Jeanmarie Bob MD    famotidine (PEPCID) tablet 20 mg, 20 mg, Oral, BID, Jeanmarie Bob MD, 20 mg at 06/15/22 2104    acidophilus-pectin, citrus tablet 2 Tablet, 2 Tablet, Oral, DAILY, Jeanmarie Bob MD, 2 Tablet at 06/15/22 0953    albuterol-ipratropium (DUO-NEB) 2.5 MG-0.5 MG/3 ML, 3 mL, Nebulization, Q6H PRN, Jeanmarie Bob MD, 3 mL at 06/14/22 1336    artificial saliva (MOUTH KOTE) 1 Spray, 1 Spray, Oral, TID, Stef Bob MD, 1 [place] at 06/15/22 2106    brimonidine (ALPHAGAN) 0.2 % ophthalmic solution 1 Drop, 1 Drop, Both Eyes, TID, Stef Bob MD, 1 Drop at 06/15/22 2105    cholestyramine-aspartame (QUESTRAN LIGHT) packet 4 g, 4 g, Oral, BID WITH MEALS, Stef Bob MD, 4 g at 06/15/22 1607    [Held by provider] fenofibrate nanocrystallized (TRICOR) tablet 48 mg, 48 mg, Oral, DAILY, Stef Bob MD, 48 mg at 05/19/22 1410    gabapentin (NEURONTIN) capsule 300 mg, 300 mg, Oral, BID, Stef Bob MD, 300 mg at 06/15/22 2103    insulin glargine (LANTUS) injection 50 Units, 50 Units, SubCUTAneous, DAILY, Stef Bob MD, 50 Units at 06/15/22 0954    latanoprost (XALATAN) 0.005 % ophthalmic solution 1 Drop, 1 Drop, Right Eye, QPM, Stef Bob MD, 1 Drop at 06/15/22 1742    traMADoL (ULTRAM) tablet 25 mg, 25 mg, Oral, Q12H PRN, Stef Bob MD, 25 mg at 06/12/22 1115

## 2022-06-16 NOTE — PROGRESS NOTES
[facility]  [phone]    Social Work Admission Note  Patient Name: Nandini Morales  YOB: 1945  Age: 78 y.o. Date of Visit: 22  Facility of Care: [facility]  Patient Room:      Hospice Attending: Sera Posada MD  Hospice Diagnosis: Sepsis [facility]) [A41.9]    Level of Care:    [x]  GIP    []  Respite   []  Routine    Consents/NCD Documentation:     Consents Reviewed:   [x]  Yes  []  No, completed by other hospice staff member. Person Reviewed/Signed with:  []  Patient   [x]  Pts NOK/MPOA  Name: Caleb Khanna \"Marsye\" Bladimir Power    Right to NCD Reviewed:   [x]  Yes  []  No, completed by other hospice staff member. NCD Requested:   []  Yes  [x]  No    Admission Nurse/Intake Notified NCD was requested:  []  Yes  []  No  [x]  Not requested    Planned Start of Care Date: 2022    Hospice Witness Representative: Neptali Zheng   Pt is a 82-year-old female admitted to inpatient hospice on 2022 with a hospice diagnosis of Sepsis. Pt was admitted to hospital on 2022 for leg pain and sacral ulcers. Pt has seven children, several grandchildren, and 1 living brother. LMSW and hospice liaison Heather Carias RN met with family outside of pt's room. Hospice team explained hospice's role at this time and hospice philosophy. Pt is a full code status and currently has wound vac, tube feeding, and IV antibiotics. Family reported hospitalization has been difficult. Family's main goal is for pt to pass peacefully. Hospice team provided education on how current interventions may interfere with that goal and supported family. All in agreement to admit to hospice. Daughters Vitol \"Maryse\" and [address], [address] are MPOA, paperwork scanned into chart. Maryse signed consents and signed Adrianea Pennant. Pt worked at [employer] and [employer] Ashley Woods became pt's caregiver in . Maryse's father  in 2017 from esophageal cancer after two days on hospice.   LMSW provided emotional support and supportive counseling in processing pt's prognosis. Pt is DNR code status, DDNR signed today. Melanie Pena FH to serve. LMSW will continue to follow and provide support.     ADVANCE CARE PLANNING    Advance Directive:  [x]  Yes  []  No   []  Would like to complete  Primary MPOA: Adricall \"Maryse\" Marina Hardwick, daughter  Secondary MPOA: Sally Abreu, daughter  Barrington Young:   Code Status: DNR  Durable DNR: Yenny Trejo  _ No  Advance Care Planning 5/15/2022   Patient's Healthcare Decision Maker is: Legal Next of Kin   Confirm Advance Directive None   Patient Would Like to Complete Advance Directive Unable       Relationship Status:  [x]  Single     []        []      []  Domestic Partner     []  /  []  Common Law  []    []  Unknown    If in a relationship, name of partner/spouse:  Duration of relationship:    Confucianist/Spirituality: Anglican  [x]  Active In Confucianist/Spirituality   []  Not Active   []  N/A  Notes:     Home: [place] [phone]  Resources Provided:  []  [facility]  []  Information on applying for disability  []  FMLA Paperwork  []  Letters Requested by [facility]   []  Tu Mckinney  []  Final Arrangements Resources   []  Outside counseling services (individual or group therapy)  []  Grief resources   []  Elie Falcon  []  [employer]   []  [facility]   [x]  [facility] [employer]   []  Referral Sent to [facility]   []  Referral Sent to [address] [place] [address]   []  Referral Sent to [facility]  []  Other  Social Work Initial Assessment     Sex:  female    Pronoun Preference:   []  He/Him/His   [x]  She/Her/Hers   []  They/Them/Theirs   []   Patient Name   []   Decline to Answer  []   Unknown  []   Other   Notes:     Race/Ethnicity: (forrest all that apply)  []  [place] ([facility] or [place] Native  []    [x]  [place] or [place] American  []   or   []  Native [facility] or [facility]  []  White  []  Unknown  [] Other     Inpatient Financial Agreement Completed:   [x]  Yes  []  No    Insurance:   [x]  Medicare   [x]  Medicaid     []  Private Insurance    []  Self-Pay/Uninsured   Notes:      Has pt applied for FAP? []  Needs to Apply  []  Application Completed and Submitted   []  Approved/ Expiration Date:   [x]  N/A  Notes:     Has pt applied for Medicaid? []  Needs to Apply  []  Application Completed and Submitted/Application Number:   [x]  N/A  Notes: Medicaid completed and approved since 2016/2017    Has a long term care screening (UAI) been requested? []  Requested   []  Not Requested, Needs follow up  []  Completed  [x]  N/A  Notes: UAI completed prior to hospitalization    Does the pt have a long term care insurance policy? []  Yes  [x]  No  []  Yes, Needing assistance with paperwork  Notes:      Service:    []  Yes   [x]  No       []  Unknown    Appropriate for Pinning Ceremony:   []  Yes      [x]  No    Is patient using VA benefits? []  Yes      [x]  No  []  Needs assistance with accessing benefits  Notes:       Work History:   []  Full-Time/Part-Time  [x]  Retired   []  Other  Notes:     Primary Language: English  []   Needed  []   utilized during visit  []   Waived/ form completed    Ability to express thoughts/needs/feelings  []  Expressed thoughts/feelings/needs without difficulty  []  Requires extra time and cuing  []  Speech limited single words  []  Uses only gestures (eye, blinking eye or head movement/pointing)  [x]  Unable to express thoughts/feelings/needs (speech unintelligible or inappropriate)  []  Unresponsive  Notes:      Mental Status:  []  Alert-oriented to:     []  Person     []  Place     []  Time  []  Comatose-responds to:    []   Verbal stimuli    []  Tactile stimuli    []  Painful stimuli  []  Forgetful  []  Disoriented/Confused  [x]  Lethargic  []  Agitated  []  Unresponsive  []  Other (specify):    Notes:      Patients description of Illness/Current Health Status:    [x]  Patient unable to discuss  []  Patient unwilling to discuss  []  (Specify)        Knowledge/Understanding of Disease Process  Patient:    []  Demonstrates knowledge/understanding of disease process   []  Demonstrates knowledge/understanding of treatment plan   []  Demonstrates knowledge/understanding of prognosis   []  Demonstrates acceptance of prognosis   []  Demonstrates knowledge/understanding of resuscitation status   [x]  Other (specify) minimally responsive  Caregiver:   [x]  Demonstrates knowledge/understanding of disease process   [x]  Demonstrates knowledge/understanding of treatment plan   [x]  Demonstrates knowledge/understanding of prognosis   [x]  Demonstrates acceptance of prognosis   [x]  Demonstrates knowledge/understanding of resuscitation status   []  Other (specify)  Notes:      Patients living arrangement/care setting:  Use the PRIOR COLUMN when the PATIENTS current health status necessitated a change in his/her primary residence.     Prior Current Response              []             []    Patients own home/residence              []             []    Home of family member/friend              []             []    Boarding home/Group Home              []             []    Assisted living facility/group home center              []             []    Hospital/Acute care facility              []             []    Skilled nursing facility              [x]             []    Long term care facility/Nursing home              []             [x]    Hospice in Patient      Primary Caregiver:  []  No Primary Caregiver  Name of Primary Caregiver: Elva \"Maryse\" Shailahandytrue Todd Caregiver Phone Number: [phone]  Relationship or Primary Caregiver:    []  Spouse/Significant other       [x]  Child        []  Step child       []  Sibling   []  Parent   []  Friend/Neighbor   []  Community/Mormonism Volunteer   []  Paid help   []  Other (specify):___________  Notes: Family members/Significant others:  Name: Rolando Berrios  Relationship: daughter  Phone Number: [phone]  Actively involved in care? [x]  Yes  []  No    Name:  Relationship:  Phone Number:  Actively involved in care?   []  Yes  []  No    Social support systems: (select ONE best description)  [x]  Excellent social support system which includes three or more family members or friends  []  Good social support system which includes two or less members or friends  []  [address] support which includes one family member or friend  []  Poor social support; no family members or friends; basically ALONE  Notes:      Emotional Status: (forrest all that apply)    Patient Caregiver Response                 []                [x]    Mood/Affect stable and appropriate                   []                []    Angry                 []                []    Anxious                 []                []    Apprehensive                 []                []    Avoidant                 []                []    Clinging                 []                []    Depressed                 []                []    Distraught                 []                []    Fearful                 []                []    Flat Affect                 []                []    Helpless                 []                []    Hostile                 []                []    Impulsive                 []                []    Irritable                 []                []    Labile                 []                []    Manic                 []                []    Restlessness                 []                [x]    Sad                 []                []    Strain/Stress                 []                []    Suspicious                 []                [x]     Tearful                 []                 []     Withdrawn          Notes:     Coping Skills (strengths/weakness):    Patient: Coping Skills (strength/weakness): good familial support   Family/caregiver (strength/weakness): good familial support, long hospitalization, withdrawal of care     Kulm of care upon discharge (forrest all that apply):     []  No burden evident   []  Family must administer medications   []  Illness causing financial strain   [x]  Family/Support feels overwhelmed   []  Family/Support sleep disturbed with patients care   []  Patients care causes extra physical stress  of death  []  Illness causes changes in family lifestyle  []  Illness impacting family/support employment  []  Family experiencing increased time demands  []  Patients behavior endangers family  []  Denial of patients illness  []  Concern over outcome of illness/fear  []  Patients behavior embarrassing to family   Notes:      Risk Factors: (forrest all that apply):    [x]  No burden evident   []  Alcohol abuse  []  Financial resources inadequate to meet basic needs (food/house/etc)  []  Financial resources inadequate to meet health care needs (supplies/equipment/medications)  []  Food/nutrition resources inadequate  []  Home environment unsafe/inadequate for home care  []  Homicidal risk  []  Lives alone or without concerned relatives  []  Multiple medications/complex schedule  []  Physical limitations increase likelihood of falls  []  Plan of care/treatments complicated  []  Substance use/abuse  []  Suicidal risk  []  Visual impairment threatens safety/ability to perform self-care  []  Other (specify):     Abuse/Neglect (actual/potential risks):  [x]  No signs of abuse/neglect  []  History of abuse/neglect                 []  Physical          []  Sexual  []  History of domestic violence  []  Lacks adequate physical care  []  Lacks emotional nurturing/support  []  Lacks appropriate stimulation/cognitive experiences  []  Left alone inappropriately  []  Lacks necessary supervision  []  Inadequate or delayed medical care  []  Unsafe environment (i.e guns/drug use/history of violence in the home/etc.)  []  Bruising or other physical signs of injury present  []  Refer to child/adult protective services  []  Other (specify):   Notes:      Current Sources of Stress (in Addition to Current Illness):   [x]  None reported  []  Bills/Debt    []  Career/Job change    []   (short term)    []   (long term)    []  Death of a child (recent)    []  Death of a parent (recent)   []  Death of a spouse (recent)   []  Employment status changed   []  Family discord    []  Financial loss/Inadequate inther (specify):come  []  Job loss  []  Legal issues unresolved  []  Lifestyle change  []  Marital discord  []  Marriage within the last year  []  Paperwork (insurance/legal/etc) overwhelming  []  Separation/Divorce  []  Other (specify):  Notes:       Interventions/Plan of Care   [x]  MSW will assess social and emotional factors related to coping with end of life issues  []  MSW will provide community resource planning/referral   []  MSW to assist with relocation to different care setting if/when symptoms stabilize  [x]  Pt/Pts family will receive emotional support. []  Pt/Pts family will share the details surrounding the pts disease progression  []  Pt/Pts Family will show expression of grief by participating in life review. []  Pt/Pts Family will be educated and able to verbalize understanding of mental, emotional, and physical symptoms of grief. []  Pt/Pts Family will be educated and able to identify skills and social support to help cope with grief. []  Pts family will receive support for grief with emphasis on developmentally appropriate language.   [] Other:   Notes:       Discharge Planning   []  Home with family member    []  Return back to nursing home/facility  [x]  Needs assistance with placement/paid caregivers  [x]  Short Stay under routine level [facility] at [facility]   []  Other  Notes: Facility placement vs [facility], has Medicaid    MSW Assessment Completed by: Melania Patel DERRICK Hood  06/16/22    Time In: 1:00 pm       Time Out: 3:00 pm

## 2022-06-16 NOTE — PROGRESS NOTES
Progress Note    Patient: Waqas Henson MRN: [id number]  SSN: [id number]    YOB: 1945  Age: 78 y.o. Sex: female      Admit Date: 5/14/2022    LOS: 32 days     Subjective:   Patient followed for sacral wound infection with repeat culture growing Acinetobacter and Enterococci. Left foot wound also grew Acinetobacter, now status post left TMA. Her temperature has trended downward today along with slight decrease in WBC. Still with pyuria and budding yeasts. Family has made decision to transfer to hospice care on Thursday. Family members at bedside. Objective:     Vitals:    06/15/22 1442 06/15/22 1455 06/15/22 1901 06/15/22 2012   BP: (!) 148/65 (!) 144/61 (!) 161/71    Pulse: 68 68 72    Resp: 16 16 20    Temp: 97.9 °F (36.6 °C) 98 °F (36.7 °C) 99.1 °F (37.3 °C) 98.8 °F (37.1 °C)   SpO2: 99% 100% 100%    Weight:       Height:            Intake and Output:  Current Shift: 06/15 1901 - 06/16 0700  In: 0   Out: 600 [Urine:600]  Last three shifts: 06/14 0701 - 06/15 1900  In: 4220 [I.V.:200]  Out: 4420 [Urine:4000; Drains:420]    Physical Exam:    Vitals and nursing note reviewed. Constitutional:       General: She is not in acute distress. Appearance: She is ill-appearing. HENT: Nasal cannula O2 2L/min    Eyes: open        Cardiovascular:      Rate and Rhythm: Normal rate and regular rhythm. Heart sounds: No murmur heard. Pulmonary: clear bilaterally    Abdominal:      General: Bowel sounds are normal.      Palpations: Abdomen is soft. Tenderness: There is no abdominal tenderness. Comments: PEG site unremarkable today except mild exudate    Genitourinary:     Comments: Indwelling Bell with clear urine         Musculoskeletal:      Cervical back: Neck supple. Right lower leg: No edema. Left lower leg: No edema. No     Comments: Left foot surgical wound incision intact with no drainage  Skin:     Findings: No rash.              Comments: Sacral wound with normal granulation tissue peripherally but central slough  Neurological: nonverbal     Lab/Data Review:     WBC 15,700    ALT/AST 55 /14  Urinalysis with WBC 20-50, Budding yeasts    Procal 0.59 <0.67 <0.67 < 0.84 <0.64 <0.31 <0.26 <0.24 <0.31 <0.36  <0.59 <0.57  CRP 13.60 < 13.00 <10.90 <14.20 < 13.60 < 14.60 <11.40 <12.60  <18.90 <17.90    Blood cultures (5/14) Coagulase negative Staphylococci  Blood cultures (6/13) No growth so far  Urine culture (6/14) Pending  Sacral wound (5/14) Acinetobacter baumannii sensitive to Unasyn, Cefepime, Ceftazidime and Enterococcus faecium resistant to Ampicillin  Sacral wound (5/23) Mixed skin yasmine FINAL  Left great toe (5/15) Acinetobacter baumannii  Sputum culture (5/19) Rare normal respiratory yasmine FINAL       CXR (6/13) Shallow depth of inspiration with improving interstitial markings but new  area of atelectasis or infiltrate in the right upper lobe adjacent to the minor fissure  Assessment:     Active Problems:    Sacral ulcer ([address] (5/14/2022)    1. Sacral wound infection secondary now to Acinetobacter baumannii and Enterococcus faecium, on Vancomycin and Unasyn, status post excisional wound debridement to the bone, Day #26 Vancomycin and Day #3 Avycaz  2. Sepsis with recurrent fever, worsening leukocytosis, increasing CRP. 3. Left foot wound, culture growing Acinetobacter baumannii, status post TMA  4. Possible ischemic hepatitiis with transaminitis following hypotensive episode, resolving  5. Positive blood cultures for Coagulase negative Staphylococci, probable contaminant  6. Hepatitis C antibody positive, resolved (negative HCV viral level)  7. New onset cardiomyopathy  8. Refractory Candida UTI with persistent pyuria and yeasts, Day #14 IV Fluconazole  9. ? Hospice planned    Comment:  Unclear if persistent Candida UTI is responsible for her new fever or resistant Gram negative rods in her sacral wound. Plans are for hospice transition on Thursday.   Will continue aggressive antibiotic therapy until then. Plan:   1. Continue  IV Vancomycin and Avycaz  (Acinetobacter resistant to Zosyn and Meropenem)  2. Continue Anidulafungin for persistent Candiduria (protect blood stream)  3.  Anticipating hospice status tomorrow    Signed By: Nakia Freitas MD     Vivien 15, 2022

## 2022-06-16 NOTE — PROGRESS NOTES
Patient alert non-verbal call bell in reach educated to use call bell for assistance no evidence of learning. Family member at bedside.   Medicated as ordered no s/s of pain discomfort or distress

## 2022-06-16 NOTE — PROGRESS NOTES
IMPRESSION:   1. Acute hypoxic respiratory failure extubated patient is now on 2 L nasal cannula  2. Pulmonary edema resolved  3. Aspiration chronically  4. Sacral decubiti ulcer with Acinetobacter and Enterococcus  5. Severe sepsis   6. Left foot wound  7. Candidal UTI  8. Shock liver resolved  9. Hypokalemia repleted  10. Symptomatic anemia stable after transfusion  11. Cardiomyopathy ejection fraction 50 to 55%      RECOMMENDATIONS/PLAN:     1. Extubated on 6/3 on nasal canula, lot of oral secretions family member at bedside we will do suctioning patient able to cough  2. Continue with Lasix  3. Drop in hemoglobin will continue to follow  4. New onset cardiomyopathy with the Lasix chest x-ray shows improvement in the pleural effusion  5. Persistent infection on Eraxis, vancomycin and Avycaz  6. Chest x-ray shows worsening of the congestion  7. Patient underwent on 5/23 left metatarsal amputation and sacral wound debridement  8. Severe sepsis with decubiti ulcer   9. Patient is on   10. Chest x-ray shows mild congestive changes with fluid in the minor fissure     [x] High complexity decision making was performed  [x] See my orders for details  HPI   68-year-old lady came in because of leg pain past medical history of hypertension diabetes mellitus peripheral vascular disease CVA she is bedbound she has leg wound and also has sacral decubiti ulcer and she was scheduled for laparoscopic colostomy and sacral wound debridement and amputation of the left tarsometatarsal because of wound infection but her condition got worse her liver enzyme was elevated INR was also elevated she was intubated transferred to ICU was getting vancomycin and Unasyn started on Levophed because of low blood pressure.     PMH:  has a past medical history of Anemia, Chronic kidney disease, Diabetes mellitus ([address] Hemiplegia affecting dominant side, post-stroke ([address] (05/04/2022), HTN (hypertension), Hyperkalemia, Hyperlipidemia, Ill-defined condition, Neuropathy, PAD (peripheral artery disease) ([facility] [address] Sciatica, Stroke ([address] and UTI (urinary tract infection). PSH:   has a past surgical history that includes hx other surgical (Bilateral); hx amputation (Right); hx other surgical; hx cervical fusion; hx vascular stent (Bilateral); hx vascular stent (Bilateral); hx gi; and ir insert non tunl cvc over 5 yrs (5/27/2022). FHX: family history includes Cancer in her mother; Diabetes in her mother; Hypertension in her mother. SHX:  reports that she has never smoked. She has never used smokeless tobacco. She reports previous alcohol use. She reports that she does not use drugs.     ALL: No Known Allergies     MEDS:   [x] Reviewed - As Below   [] Not reviewed    Current Facility-Administered Medications   Medication    anidulafungin (ERAXIS) 100 mg in 0.9% sodium chloride 130 mL IVPB    Vancomycin - Trough to be drawn prior to dose 6/16 @ 1600    cefTAZidime-avibactam (AVYCAZ) 2.5 g in 0.9% sodium chloride 100 mL ([id number])    scopolamine (TRANSDERM-SCOP) 1 mg over 3 days 1 Patch    vancomycin (VANCOCIN) 500 mg in 0.9% sodium chloride (MBP/ADV) 100 mL MBP    calcium polycarbophiL (FIBERCON) tablet 625 mg    ferrous sulfate tablet 325 mg    furosemide (LASIX) injection 40 mg    metoprolol tartrate (LOPRESSOR) tablet 12.5 mg    alteplase (CATHFLO) injection 2 mg    aspirin chewable tablet 81 mg    sodium chloride (NS) flush 5-40 mL    sodium chloride (NS) flush 5-40 mL    ondansetron (ZOFRAN) injection 4 mg    enoxaparin (LOVENOX) injection 40 mg    insulin lispro (HUMALOG) injection    glucose chewable tablet 16 g    glucagon (GLUCAGEN) injection 1 mg    hydrALAZINE (APRESOLINE) tablet 12.5 mg    sacubitriL-valsartan (ENTRESTO) 24-26 mg tablet 1 Tablet    dextrose 10% infusion 0-250 mL    insulin glargine (LANTUS) injection 10 Units    0.9% sodium chloride infusion 250 mL    Vancomycin Pharmacy Dosing    acetaminophen (TYLENOL) tablet 650 mg    Or    acetaminophen (TYLENOL) suppository 650 mg    polyethylene glycol (MIRALAX) packet 17 g    ondansetron (ZOFRAN ODT) tablet 4 mg    famotidine (PEPCID) tablet 20 mg    acidophilus-pectin, citrus tablet 2 Tablet    albuterol-ipratropium (DUO-NEB) 2.5 MG-0.5 MG/3 ML    artificial saliva (MOUTH KOTE) 1 Spray    brimonidine (ALPHAGAN) 0.2 % ophthalmic solution 1 Drop    cholestyramine-aspartame (QUESTRAN LIGHT) packet 4 g    [Held by provider] fenofibrate nanocrystallized (TRICOR) tablet 48 mg    gabapentin (NEURONTIN) capsule 300 mg    insulin glargine (LANTUS) injection 50 Units    latanoprost (XALATAN) 0.005 % ophthalmic solution 1 Drop    traMADoL (ULTRAM) tablet 25 mg      MAR reviewed and pertinent medications noted or modified as needed   Current Facility-Administered Medications   Medication    anidulafungin (ERAXIS) 100 mg in 0.9% sodium chloride 130 mL IVPB    Vancomycin - Trough to be drawn prior to dose 6/16 @ 1600    cefTAZidime-avibactam (AVYCAZ) 2.5 g in 0.9% sodium chloride 100 mL ([id number])    scopolamine (TRANSDERM-SCOP) 1 mg over 3 days 1 Patch    vancomycin (VANCOCIN) 500 mg in 0.9% sodium chloride (MBP/ADV) 100 mL MBP    calcium polycarbophiL (FIBERCON) tablet 625 mg    ferrous sulfate tablet 325 mg    furosemide (LASIX) injection 40 mg    metoprolol tartrate (LOPRESSOR) tablet 12.5 mg    alteplase (CATHFLO) injection 2 mg    aspirin chewable tablet 81 mg    sodium chloride (NS) flush 5-40 mL    sodium chloride (NS) flush 5-40 mL    ondansetron (ZOFRAN) injection 4 mg    enoxaparin (LOVENOX) injection 40 mg    insulin lispro (HUMALOG) injection    glucose chewable tablet 16 g    glucagon (GLUCAGEN) injection 1 mg    hydrALAZINE (APRESOLINE) tablet 12.5 mg    sacubitriL-valsartan (ENTRESTO) 24-26 mg tablet 1 Tablet    dextrose 10% infusion 0-250 mL    insulin glargine (LANTUS) injection 10 Units    0.9% sodium chloride infusion 250 mL    Vancomycin Pharmacy Dosing    acetaminophen (TYLENOL) tablet 650 mg    Or    acetaminophen (TYLENOL) suppository 650 mg    polyethylene glycol (MIRALAX) packet 17 g    ondansetron (ZOFRAN ODT) tablet 4 mg    famotidine (PEPCID) tablet 20 mg    acidophilus-pectin, citrus tablet 2 Tablet    albuterol-ipratropium (DUO-NEB) 2.5 MG-0.5 MG/3 ML    artificial saliva (MOUTH KOTE) 1 Spray    brimonidine (ALPHAGAN) 0.2 % ophthalmic solution 1 Drop    cholestyramine-aspartame (QUESTRAN LIGHT) packet 4 g    [Held by provider] fenofibrate nanocrystallized (TRICOR) tablet 48 mg    gabapentin (NEURONTIN) capsule 300 mg    insulin glargine (LANTUS) injection 50 Units    latanoprost (XALATAN) 0.005 % ophthalmic solution 1 Drop    traMADoL (ULTRAM) tablet 25 mg      PMH:  has a past medical history of Anemia, Chronic kidney disease, Diabetes mellitus ([facility] [address] Hemiplegia affecting dominant side, post-stroke ([facility] [address] (05/04/2022), HTN (hypertension), Hyperkalemia, Hyperlipidemia, Ill-defined condition, Neuropathy, PAD (peripheral artery disease) ([facility] [address] Sciatica, Stroke ([facility] [address] and UTI (urinary tract infection). PSH:   has a past surgical history that includes hx other surgical (Bilateral); hx amputation (Right); hx other surgical; hx cervical fusion; hx vascular stent (Bilateral); hx vascular stent (Bilateral); hx gi; and ir insert non tunl cvc over 5 yrs (5/27/2022). FHX: family history includes Cancer in her mother; Diabetes in her mother; Hypertension in her mother. SHX:  reports that she has never smoked. She has never used smokeless tobacco. She reports previous alcohol use. She reports that she does not use drugs. ROS:  Unable to obtain    Hemodynamics:    CO:    CI:    CVP:    SVR:   PAP Systolic:    PAP Diastolic:    PVR:    TA69:        Ventilator Settings:      Mode Rate TV Press PEEP FiO2 PIP Min.  Vent   Pressure support,Spontaneous    500 ml 15 cm H2O  5 cm H20 28 %  27 cm H2O  13.2 l/min        Vital Signs: Telemetry:    normal sinus rhythm Intake/Output:   Visit Vitals  BP (!) 149/57 (BP 1 Location: Right upper arm, BP Patient Position: At rest)   Pulse 77   Temp 98.9 °F (37.2 °C)   Resp 20   Ht 5' 6.93\" (1.7 m)   Wt 97.4 kg (214 lb 11.7 oz)   SpO2 100%   Breastfeeding No   BMI 33.70 kg/m²       Temp (24hrs), Av.6 °F (37 °C), Min:97.9 °F (36.6 °C), Max:99.1 °F (37.3 °C)        O2 Device: Nasal cannula O2 Flow Rate (L/min): 1 l/min       Wt Readings from Last 4 Encounters:   22 97.4 kg (214 lb 11.7 oz)   04/10/22 86.6 kg (191 lb)   22 82.2 kg (181 lb 3.5 oz)   20 84.4 kg (186 lb)          Intake/Output Summary (Last 24 hours) at 2022 0910  Last data filed at 2022 0600  Gross per 24 hour   Intake 1320 ml   Output 2970 ml   Net -1650 ml       Last shift:      No intake/output data recorded. Last 3 shifts:  1901 -  0700  In: 5 [I.V.:200]  Out: 4170 [Urine:4100; Drains:70]       Physical Exam:     General: Open her eyes follows no commands  HEENT: NCAT,  Eyes: anicteric; conjunctiva clear, random eye movement when eyelids are open  Neck: no nodes, , trach midline; no accessory MM use. Neck veins obscured by obesity but seems slightly engorged  Chest: no deformity,   Cardiac: R regular; no murmur;   Lungs: Shallow breaths with diffuse wheezes and rhonchi  Abd: soft, NT, hypoactive BS  Ext: Diffuse edema; no joint swelling;  No clubbing  :clear urine  Neuro: Seems to weakly try to open her eyes follows no commands does not speak does not move her extremities  Psych-  unable to assess  Skin: warm, dry, no cyanosis;   Pulses: Brachial and radial pulses intact  Capillary: Normal capillary refill      DATA:    MAR reviewed and pertinent medications noted or modified as needed  MEDS:   Current Facility-Administered Medications   Medication    anidulafungin (ERAXIS) 100 mg in 0.9% sodium chloride 130 mL IVPB    Vancomycin - Trough to be drawn prior to dose  @ 1600    cefTAZidime-avibactam (AVYCAZ) 2.5 g in 0.9% sodium chloride 100 mL ([id number])    scopolamine (TRANSDERM-SCOP) 1 mg over 3 days 1 Patch    vancomycin (VANCOCIN) 500 mg in 0.9% sodium chloride (MBP/ADV) 100 mL MBP    calcium polycarbophiL (FIBERCON) tablet 625 mg    ferrous sulfate tablet 325 mg    furosemide (LASIX) injection 40 mg    metoprolol tartrate (LOPRESSOR) tablet 12.5 mg    alteplase (CATHFLO) injection 2 mg    aspirin chewable tablet 81 mg    sodium chloride (NS) flush 5-40 mL    sodium chloride (NS) flush 5-40 mL    ondansetron (ZOFRAN) injection 4 mg    enoxaparin (LOVENOX) injection 40 mg    insulin lispro (HUMALOG) injection    glucose chewable tablet 16 g    glucagon (GLUCAGEN) injection 1 mg    hydrALAZINE (APRESOLINE) tablet 12.5 mg    sacubitriL-valsartan (ENTRESTO) 24-26 mg tablet 1 Tablet    dextrose 10% infusion 0-250 mL    insulin glargine (LANTUS) injection 10 Units    0.9% sodium chloride infusion 250 mL    Vancomycin Pharmacy Dosing    acetaminophen (TYLENOL) tablet 650 mg    Or    acetaminophen (TYLENOL) suppository 650 mg    polyethylene glycol (MIRALAX) packet 17 g    ondansetron (ZOFRAN ODT) tablet 4 mg    famotidine (PEPCID) tablet 20 mg    acidophilus-pectin, citrus tablet 2 Tablet    albuterol-ipratropium (DUO-NEB) 2.5 MG-0.5 MG/3 ML    artificial saliva (MOUTH KOTE) 1 Spray    brimonidine (ALPHAGAN) 0.2 % ophthalmic solution 1 Drop    cholestyramine-aspartame (QUESTRAN LIGHT) packet 4 g    [Held by provider] fenofibrate nanocrystallized (TRICOR) tablet 48 mg    gabapentin (NEURONTIN) capsule 300 mg    insulin glargine (LANTUS) injection 50 Units    latanoprost (XALATAN) 0.005 % ophthalmic solution 1 Drop    traMADoL (ULTRAM) tablet 25 mg        Labs:    Recent Labs     06/16/22  0420 06/15/22  0540 06/14/22  0547   WBC  --  15.7* 14.8*   HGB  --  9.8* 9.0*   PLT  --  389 363   INR 1.4* 1.6* 1.6*     Recent Labs     06/15/22  0540 06/14/22  1542 22  0547   *  --  154*   K 3.8  --  3.9   *  --  121*   CO2 27  --  27   *  --  279*   BUN 31*  --  35*   CREA 0.77 0.89 0.91   CA 9.0  --  8.9   MG 2.1  --   --    PHOS 2.2*  --   --    ALB  --   --  1.9*   ALT  --   --  26     No results for input(s): PH, PCO2, PO2, HCO3, FIO2 in the last 72 hours. 6/ and IV IP 16 EP 5 rate 16 FiO2 30%    AC 12  PEEP 5 FiO2 30%   echo ejection fraction 20% mild mitral regurg left atrium 3.5 cm RVSP 35  , 1313, 1361    Lab Results   Component Value Date/Time    Culture result: No growth after 7 hours 2022 08:24 PM    Culture result: Heavy  Mixed skin yasmine isolated   2022 06:15 PM    Culture result: Rare Normal respiratory yasmine 2022 02:48 PM     Lab Results   Component Value Date/Time    TSH 2.880 2016 10:13 AM        Imagin/22 chest x-ray with ET tube in place hazy accentuated basilar markings with small amount of fluid in the minor fissure  Results from Hospital Encounter encounter on 22    XR CHEST PORT    Narrative  Semiupright portable radiograph chest 4:32 p.m. compared to Vivien 10, 2022. INDICATION: New fever. Heart size remains enlarged. Shallow depth of inspiration. There is increasing  airspace disease or atelectasis in the right upper lobe abutting the minor  fissure overall decreasing interstitial markings throughout both lungs. Cannot  exclude a small left pleural effusion. No pneumothorax. No acute bony findings. Impression  1. Shallow depth of inspiration with improving interstitial markings but new  area of atelectasis or infiltrate in the right upper lobe adjacent to the minor  fissure. Results from [place] encounter on 22    CT ABD PELV WO CONT    Narrative  CT ABD PELV WO CONT    Technique: Noncontrasted CT scan of the abdomen and pelvis was performed  utilizing transaxial images obtained from the dome of the diaphragm to the pubic  symphysis.  Coronal and sagittal reconstructions were generated. Dose Reduction:  All CT scans at this facility are performed using dose reduction optimization  techniques as appropriate to a performed exam including the following: Automated  exposure control, adjustments of the mA and/or kV according to patient size, or  use of iterative reconstruction technique. Comparison:  2/25/2022. Findings:  Small-moderate pleural effusions are present with bibasilar  atelectasis. Atherosclerotic calcifications are again evident including coronary  artery calcifications. Gastrostomy tube tip in the gastric antrum. The liver, spleen, pancreas, and  adrenal glands are unremarkable for noncontrasted technique. Stable lobulated  kidneys. Negative for hydronephrosis. Gallbladder is unremarkable. No biliary  duct dilatation apparent. The abdominal aorta is normal in caliber. There is no evidence of bowel obstruction. Small volume ascites has developed. The urinary bladder is decompressed by a Bell catheter. The uterus is  unremarkable. The appendix is normal.    There is mild body wall edema. No suspicious lytic or blastic lesion evident. Impression  Third spacing of fluids with small volume ascites, small-moderate  pleural effusions, and mild body wall edema. Bibasilar atelectasis. 5/20 intubated on ventilator we will continue current vent settings patient is supposed to go for surgery because of transaminitis elevated liver enzyme we will wait as per surgeon for sacral wound debridement and diverting loop colostomy off Levophed, improvement in AST and ALT  5/21 continue with the current vent settings ABG acceptable liver enzyme improving awaiting for the surgery  5/22 maintain ventilator as is in anticipation of surgery.   Renal function improved mild congestion on chest x-ray we will give 1 dose Lasix and potassium  5/23 patient is going for surgery today we will leave ventilator as it is INR is 1.5  5/25 patient remained on ventilator intubated doing well, patient received vitamin K fresh frozen plasma schedule for laparoscopic loop colostomy possible today  5/26 no surgery has been plans will start weaning do sedation vacation  5/27 try to do sedation vacation to wean patient blood pressure dropped still on Levophed we will continue to wean discussed with the family at bedside  5/28 remains on ventilator sedated and also on Levophed possible surgery next week  5/29 on ventilator ET tube advisement 1 to 1.5 cm we will repeat chest x-ray ABG acceptable  5/30 continue with current vent setting  5/31 awaiting for possible surgery if not we will start weaning  6/1 remain intubated on ventilator surgery has been delayed we will start weaning and plan for extubation  6/2 no surgery was planned so we will try to wean she is off Levophed we will do sedation vacation and if weaning cardio acceptable will extubate  6/3 change vent settings to spontaneous if weaning creatinine acceptable will extubate  6/4 extubated on 6/3 now on nasal cannula tolerating well  6/5 respiratory distress last night placed on noninvasive ventilator chest x-ray continues to show fluid overload. Will increase Lasix to 40 mg twice daily give albumin today. Likely has continued aspiration. Will increase Robinul to every 6 hours dosing  6/6 patient is alert awake he is full code we will try to wean BiPAP put on nasal cannula and keep BiPAP at night  6/7 noninvasive ventilator Machine we will change it to high flow nasal cannula  6/8 on noninvasive ventilator BiPAP machine chest x-ray shows improvement overall condition prognosis poor  6/10 transition from BiPAP to nasal cannula.   3 L ABG acceptable  6/16 condition remains same on nasal cannula lot of oral secretions possible hospice evaluation

## 2022-06-16 NOTE — HOSPICE
[facility]  [phone]    Inpatient Nursing Admission   Patient Name: Shorty Duong  YOB: 1945  Age: 78 y.o. Date of Hospice Admission: 6/16/2022  Hospice Attending Elected by Patient: Kehinde Schaefer MD  Primary Care Physician: Donald Frey MD  Admitting RN: Raulito Ames RN  : Maggie Agudelo LMSW     Level of Care (GIP/Routine/Respite): GIP  Facility of Care: [facility]  Patient Room: 207/01     HOSPICE SUMMARY   ER Visits/ Hospitalizations in past year:  3  Hospice Diagnosis: Sepsis ([facility] [address] [A41.9]  Onset Date of Hospice Diagnosis: 6/16/22  Summary of Disease Progression Leading to Hospice Diagnosis:   Shorty Duong is a 78 y.o. female who has a history of anemia chronic kidney disease resolved diabetes mellitus hemiplegia, hypertension, hyperlipidemia, neuropathy, peripheral arterial disease, stroke multiple UTI bedbound patient was brought from the nursing home with a complaint of leg pain. Patient however denied any leg pain she has a decubitus ulcers and WBC is elevated at 16,000.   Admitted again yesterday concerning for worsening of sacral ulcer     During last admission patient was seen by infectious disease and also seen by the vascular surgeon patient had debridement of wound during the last admission     Patient seen by the infectious disease yesterday     Sacral wound infection recent cultures growing Pseudomonas resistant to Zosyn and meropenem   5/17  Patient alert awake not in distress  Patient was seen by the vascular surgeon  Vascular surgery planned for laparoscopic loop colostomy placement and sacral debridement then wound vacuum  Also try to close the wound on the left foot with TMA   5/18   Resting in the bed not in any distress  Blood sugars running high   Seen by infectious disease continue vancomycin and start on Unasyn   5/19   And went to the OR intubated because of elevated LFT and elevated INR  Surgery was canceled   On ventilator 40% O2  Propofol drip   5/20   Patient on ventilator with 30% O2  On propofol drip   Hemoglobin dropped to 6.8   Patient's daughter at the bedside   5/20   Patient on ventilator 30% O2  On propofol drip  Getting PEG tube feeding   5/21   Patient still on ventilator 30% O2  On propofol drip  Liver Function improving  5/22  On ventilator with 30% O2 on propofol drip  5/24  Awaiting tissue culture results. On ventilator with 30% O2 on propofol drip  Left transmetatarsal amputation and Sacral wound excisional wound debridement 13 x 15 cm to the bone completed yesterday. CXR: Hazy mid and lower lung reticular markings.  Dependent small to moderate volume,  right greater than left pleural effusions  Remarkable labs   WBC: 15.2   Hgb: 8.5   Na; 148  CRP: 13.60   Procal: 0.71   5/25   Patient on ventilator with 30% O2  Seen by the vascular surgeon and plan for surgery today   5/26  Patient on ventilator with 30% O2  Hypotensive on Levophed  On propofol drip   Surgery was canceled yesterday because patient unstable hemodynamically not cleared by the anesthesia   5/27   Patient on ventilator with 30% O2  Hypotensive on Levophed  Getting PEG tube feeding   5/30    Patient on ventilator with 21% O2  On propofol drip  Off pressor   5/31   Patient on ventilator with 21% O2  On propofol drip   Off pressor  Patient have a wound vacuum    6/1   Patient scheduled for surgery today  6/2   Surgery was canceled yesterday  As overall prognosis very poor  Patient on vent on 21% O2  Getting PEG tube feeding  Wound vacuum intact    6/3   Patient still on ventilator  Seen by vascular surgeon left foot concern of ischemic changes   6/4   Patient extubated /tachypneic daughter at the bedside   6/5   Patient on BiPAP  30% O2   6/6  Patient on BiPAP 30% O2   Awake not in distress   6/7   Still on BiPAP 30% O2  Awake  6/8  Patient alert awake on BiPAP 30% O2 patient's daughter at the bedside  Still waiting for family decision regarding further treatment plan  Regarding hospice  6/9   Patient sleeping in the bed on BiPAP 30% percent O2  Wound vacuum in place   6/10   Patient off BiPAP on nasal cannula 3 L   6/11  On nasal cannula oxygen saturation 99%  Patient spiked fever 102.7 early this morning   Co-Morbidities:   Patient Active Problem List   Diagnosis Code    HTN (hypertension) I10    Hyperlipidemia E78.5    Neuropathy G62.9    Sciatica M54.30    Diabetes mellitus with neurological manifestations, uncontrolled [id number]    Infection of nailbed of toe of left foot L03.032    PAD (peripheral artery disease) ([facility]) I73.9    Hypercalcemia E83.52    DM type 2 causing vascular disease ([facility] [address] E11.59    Type 2 diabetes mellitus with nephropathy ([facility] [address] E11.21    Acute osteomyelitis of ankle or foot ([address] M86.179    Diabetic peripheral neuropathy ([facility] [address] E11.42    Spinal stenosis in cervical region M48.02    Ischemia of both lower extremities I99.8    Pain in both lower legs M79.661, M79.662    CVA (cerebral vascular accident) ([address] I63.9    Elevated troponin R77.8    Hyperkalemia E87.5    UTI (urinary tract infection) N39.0    Sepsis ([facility]) A41.9    RICARDO (acute kidney injury) ([address] N17.9    AMS (altered mental status) R41.82    Sacral ulcer ([address] L98.429     Diagnoses RELATED to the terminal prognosis: PAD, DM2, UTI, Sacral Ulcer, aspiration pneumonia,  Other Diagnoses:  CVA, PEG    Rationale for a prognosis of life expectancy of 6 months or less if the disease follows its normal course (Disease Specific History):     Marcella Wong is a 78 y.o. who was admitted to [facility]. The patient's principle diagnosis of sepsis has resulted in pain, shortness of breath, fever, leukocytosis, AMS. Functionally, the patient's Palliative Performance Scale has declined over a period of the past six weeks and is estimated at 10-20. The patient/family chose comfort measures with the support of Hospice.   Objective information that support this patients limited prognosis includes:      Progress Note     Patient: Raissa Husain MRN: [id number]  SSN: [id number]    YOB: 1945  Age: 78 y.o. Sex: female       Admit Date: 5/14/2022    LOS: 32 days      Subjective:   Per Yg Velasquez MD: Patient followed for sacral wound infection with repeat culture growing Acinetobacter and Enterococci. Left foot wound also grew Acinetobacter, now status post left TMA. Her temperature has trended downward today along with slight decrease in WBC. Still with pyuria and budding yeasts. Family has made decision to transfer to hospice care on Thursday. Family members at bedside.     Intake and Output:  Current Shift: 06/15 1901 - 06/16 0700  In: 0   Out: 600 [Urine:600]  Last three shifts: 06/14 0701 - 06/15 1900  In: 6417 [I.V.:200]  Out: 5010 [Urine:4000; Drains:420]     Physical Exam:    Macon Boeck note reviewed. Constitutional:       General: She is not in acute distress.     Appearance: She is ill-appearing. HENT: Nasal cannula O2 2L/min    Eyes: open        Cardiovascular:      Rate and Rhythm: Normal rate and regular rhythm.      Heart sounds: No murmur heard. Pulmonary: clear bilaterally    Abdominal:      General: Bowel sounds are normal.      Palpations: Abdomen is soft.      Tenderness: There is no abdominal tenderness.      Comments: PEG site unremarkable today except mild exudate    Genitourinary:     Comments: Indwelling Bell with clear urine         Musculoskeletal:      Cervical back: Neck supple.      Right lower leg: No edema.      Left lower leg: No edema.  No     Comments: Left foot surgical wound incision intact with no drainage  Skin:     Findings: No rash.          Comments: Sacral wound with normal granulation tissue peripherally but central slough  Neurological: nonverbal      Lab/Data Review:     WBC 15,700    ALT/AST 55 /14  Urinalysis with WBC 20-50, Budding yeasts     Procal 0.59 <0.67 <0.67 < 0.84 <0.64 <0.31 <0.26 <0.24 <0.31 <0.36  <0.59 <0.57  CRP 13.60 < 13.00 <10.90 <14.20 < 13.60 < 14.60 <11.40 <12.60  <18.90 <17.90     Blood cultures (5/14) Coagulase negative Staphylococci  Blood cultures (6/13) No growth so far  Urine culture (6/14) Pending  Sacral wound (5/14) Acinetobacter baumannii sensitive to Unasyn, Cefepime, Ceftazidime and Enterococcus faecium resistant to Ampicillin  Sacral wound (5/23) Mixed skin yasmine FINAL  Left great toe (5/15) Acinetobacter baumannii  Sputum culture (5/19) Rare normal respiratory yasmine FINAL      CXR (6/13) Shallow depth of inspiration with improving interstitial markings but new  area of atelectasis or infiltrate in the right upper lobe adjacent to the minor fissure  Assessment:      Active Problems:    Sacral ulcer ([address] (5/14/2022)     1. Sacral wound infection secondary now to Acinetobacter baumannii and Enterococcus faecium, on Vancomycin and Unasyn, status post excisional wound debridement to the bone, Day #26 Vancomycin and Day #3 Avycaz  2. Sepsis with recurrent fever, worsening leukocytosis, increasing CRP. 3. Left foot wound, culture growing Acinetobacter baumannii, status post TMA  4. Possible ischemic hepatitiis with transaminitis following hypotensive episode, resolving  5. Positive blood cultures for Coagulase negative Staphylococci, probable contaminant  6. Hepatitis C antibody positive, resolved (negative HCV viral level)  7. New onset cardiomyopathy  8. Refractory Candida UTI with persistent pyuria and yeasts, Day #14 IV Fluconazole  9. ? Hospice planned     Comment:  Unclear if persistent Candida UTI is responsible for her new fever or resistant Gram negative rods in her sacral wound. Plans are for hospice transition on Thursday.   Will continue aggressive antibiotic therapy until then.              Recent Labs     06/15/22  0540 06/14/22  0547   WBC 15.7* 14.8*   HGB 9.8* 9.0*   HCT 33.4* 29.8*    363            Recent Labs 06/15/22  0540 06/14/22  1543 06/14/22  0547   *  --  154*   K 3.8  --  3.9   *  --  121*   CO2 27  --  27   BUN 31*  --  35*   CREA 0.77 0.89 0.91   *  --  279*   CA 9.0  --  8.9   MG 2.1  --   --    PHOS 2.2*  --   --           Recent Labs     06/14/22  0547   ALT 26      TBILI 0.4   TP 6.8   ALB 1.9*   GLOB 4.9*            Recent Labs     06/16/22  0420 06/15/22  0540 06/14/22  0547   INR 1.4* 1.6* 1.6*   PTP 17.6* 18.7* 18.7*      Patient meets for GIP LOC as evidenced by: need for frequent nursing assessment and management of IV medications that cannot be administered outside of the hospital.    Prognosis estimated based on 06/16/22 clinical assessment is:   [] Few to Many Hours  [x] Hours to Days   [] Few to Many Days   [] Days to Weeks   [] Few to Many Weeks   [] Weeks to Months   [] Few to Many Months    ASSESSMENT    Patient self-reports:  []  Yes    [x] No    SYMPTOMS: use of accessory muscles, gurgling, grimacing, skin warm, does not follow commands    SIGNS/PHYSICAL FINDINGS: shortness of breath, pain, airway secretions, non verbal pain, AMS    KARNOFSKY: 10    Learning Assessment:  Patient  Is patient willing/able to learn? Unable  What is the highest level of education completed? Learning preference (written material, demonstration, visual)? Learning barriers (ESOL, [place], poor vision)? Caregiver  Is caregiver willing to learn care for patient? Unable  What is the highest level of education completed? Learning preference (written material, demonstration, visual)? Learning barriers (ESOL, [place], poor vision)? Currently this patient has:  [x] Supplemental O2 [x] Peripheral IV  [] PICC    [] PORT   [x] Bell Catheter [] NG Tube   [x] PEG Tube [] Ostomy    [] AICD: Has ICD been deactivated? [] Yes [] No:______    PLAN     1. Admit GIP for management of symptoms related to sepsis (fever, dyspnea, pain, secretions)  2.  Dilaudid 1 mg IV every 4 hours and every 15 minutes prn  3. Lorazepam 1 mg IV every 4 hours and every 15 minutes prn  4. Glycopyrrolate 0.4 mg IV every 4 hours  5. Scopolamine 1 mg transdermal every 72 hours  6. Ketorolac 30mg IV every 8 hours prn  7. Contact precautions for multiple organism infections; wounds  8. Bell  9. Educate family in EOL process and symptom management  10.  and SW to meet needs of family    Hospice Team Frequency Orders:  Skilled Nurse -  Daily x 7 days /every other day x 7 days  with 5 PRN visits for symptom control. MSW - 1 visit for initial assessment/evaluation for family support and need for volunteer services. Gaby Ferrarot - 1 visit for initial assessment/evaluation for spiritual support. ADVANCE CARE PLANNING (Complete in ACP Flow Sheet)   Code Status: DNR  Durable DNR: []  Yes  [x]  No  Code Status Discussed/Confirmed: Yes  Preference for Other Life Sustaining Treatment Discussed/Confirmed: Yes  Hospitalization Preference:  Family would like patient to receive end of life care in the hospital.    Adventism: Buddhism   Home: [place]     DISCHARGE PLANNING     1. Discharge Plan: Discharge to LTC should condition stabilize; patient will most likely  in hospital  2. Patient/Family teaching: Goals of care/EOL process/Symptom management  3. Response to patient/family teaching:  Family verbalized understanding    SOCIAL/EMOTIONAL/SPIRITUAL NEEDS     Spiritual Issues Identified: None identified. Psych/ Social/ Emotional Issues Identified: Patient , 7 living children who are all involved in decision making/care of patient. Gage Moore (youngest) serves as mPOA and has cared for patient in her home since 2016. Family in need of much needed emotional support and education. SW present during admission.     Caregiver Support:  [x] Provided information on End of Life Care   [x] Material Provided: [facility] or Jason Santo contacted, hospice order received, agrees to serve as attending provider for hospice and provided verbal certification of terminal illness with life expectancy of 6 months or less. Orders for hospice admission, medications and plan of treatment received. Medication reconciliation completed.   MEDS: See medication list below  DME: Per hospital  Supplies: Per hospital  IDT communication to include MD, , SW, CH and support team    ALLERGIES AND MEDICATIONS     Allergies: No Known Allergies  Current Facility-Administered Medications   Medication Dose Route Frequency    LORazepam (ATIVAN) injection 1 mg  1 mg IntraVENous Q15MIN PRN    ketorolac (TORADOL) injection 30 mg  30 mg IntraVENous Q8H PRN    scopolamine (TRANSDERM-SCOP) 1 mg over 3 days 1 Patch  1 Patch TransDERmal Q72H PRN    glycopyrrolate (ROBINUL) injection 0.4 mg  0.4 mg IntraVENous Q4H    bisacodyL (DULCOLAX) suppository 10 mg  10 mg Rectal DAILY PRN    HYDROmorphone (DILAUDID) injection 1 mg  1 mg IntraVENous Q15MIN PRN    HYDROmorphone (DILAUDID) injection 1 mg  1 mg IntraVENous Q4H    LORazepam (ATIVAN) injection 1 mg  1 mg IntraVENous Q4H    saline peripheral flush soln 5 mL  5 mL InterCATHeter PRN    scopolamine (TRANSDERM-SCOP) 1 mg over 3 days 1 Patch  1 Patch TransDERmal Q72H

## 2022-06-16 NOTE — PROGRESS NOTES
Called report to [employer] on 5th floor. Patient transferred to room 570.   Family at bedside

## 2022-06-16 NOTE — PROGRESS NOTES
CM reviewed the clinical record. Patient is to admitted to inpatient hospice today under the services of  Hospice.      [address], [address]

## 2022-06-16 NOTE — PROGRESS NOTES
Progress Note    Patient: Deanne Delgado MRN: [id number]  SSN: [id number]    YOB: 1945  Age: 78 y.o. Sex: female      Admit Date: 5/14/2022    LOS: 33 days     Subjective:   Patient followed for sacral wound infection with repeat culture growing Acinetobacter and Enterococci. Left foot wound also grew Acinetobacter, now status post left TMA. Her temperature has trended downward today along with slight decrease in WBC. Still with pyuria and budding yeasts. Family has made decision to transfer to hospice care on Thursday. Family members at bedside. Objective:     Vitals:    06/16/22 0257 06/16/22 0803 06/16/22 0805 06/16/22 0817   BP: (!) 151/65   (!) 149/57   Pulse: 76   77   Resp: 18   20   Temp: 98.7 °F (37.1 °C)   98.9 °F (37.2 °C)   SpO2: 100% 100% 99% 100%   Weight:       Height:            Intake and Output:  Current Shift: No intake/output data recorded. Last three shifts: 06/14 1901 - 06/16 0700  In: 2900 [I.V.:200]  Out: 4170 [Urine:4100; Drains:70]    Physical Exam:    Vitals and nursing note reviewed. Constitutional:       General: She is not in acute distress. Appearance: She is ill-appearing. HENT: Nasal cannula O2 2L/min    Eyes: open        Cardiovascular:      Rate and Rhythm: Normal rate and regular rhythm. Heart sounds: No murmur heard. Pulmonary: clear bilaterally    Abdominal:      General: Bowel sounds are normal.      Palpations: Abdomen is soft. Tenderness: There is no abdominal tenderness. Comments: PEG site unremarkable today except mild exudate    Genitourinary:     Comments: Indwelling Bell with clear urine         Musculoskeletal:      Cervical back: Neck supple. Right lower leg: No edema. Left lower leg: No edema. No     Comments: Left foot surgical wound incision intact with no drainage  Skin:     Findings: No rash.              Comments: Sacral wound with normal granulation tissue peripherally but central slough  Neurological: nonverbal     Lab/Data Review:     WBC 15,700    ALT/AST 55 /14  Urinalysis with WBC 20-50, Budding yeasts    Procal 0.59 <0.67 <0.67 < 0.84 <0.64 <0.31 <0.26 <0.24 <0.31 <0.36  <0.59 <0.57  CRP 13.60 < 13.00 <10.90 <14.20 < 13.60 < 14.60 <11.40 <12.60  <18.90 <17.90    Blood cultures (5/14) Coagulase negative Staphylococci  Blood cultures (6/13) No growth so far  Urine culture (6/14) Pending  Sacral wound (5/14) Acinetobacter baumannii sensitive to Unasyn, Cefepime, Ceftazidime and Enterococcus faecium resistant to Ampicillin  Sacral wound (5/23) Mixed skin yasmine FINAL  Left great toe (5/15) Acinetobacter baumannii  Sputum culture (5/19) Rare normal respiratory yasmine FINAL    Assessment:     Active Problems:    Sacral ulcer ([address] (5/14/2022)    1. Sacral wound infection secondary now to Acinetobacter baumannii and Enterococcus faecium, on Vancomycin and Unasyn, status post excisional wound debridement to the bone, Day #27 Vancomycin and Day #4 Avycaz  2. Sepsis with recurrent fever, worsening leukocytosis, increasing CRP. 3. Left foot wound, culture growing Acinetobacter baumannii, status post TMA  4. Possible ischemic hepatitiis with transaminitis following hypotensive episode, resolving  5. Positive blood cultures for Coagulase negative Staphylococci, probable contaminant  6. Hepatitis C antibody positive, resolved (negative HCV viral level)  7. New onset cardiomyopathy  8. Refractory Candida UTI with persistent pyuria and yeasts, on IV Anidulafungin  9. ? Hospice planned    Comment:  Unclear if persistent Candida UTI is responsible for her new fever or resistant Gram negative rods in her sacral wound. Plans are for hospice transition on Thursday. Will continue aggressive antibiotic therapy until then. Plan:   1. Continue  IV Vancomycin and Avycaz  (Acinetobacter resistant to Zosyn and Meropenem)  2. Continue Anidulafungin for persistent Candiduria (protect blood stream)  3. Anticipating hospice status tomorrow    Signed By: Jean Bishop MD     June 16, 2022

## 2022-06-17 NOTE — PROGRESS NOTES
Problem: Emotional Support Needs  Goal: Patient/family is receiving emotional support  Description: Pt's daughter Perla Kelley and family will receive emotional support and supportive counseling in processing pt's terminal prognosis within five days.   Outcome: Progressing Towards Goal

## 2022-06-17 NOTE — H&P
History and Physical    NAME: Nivia Dalal   :  1945   MRN:  [id number]     Date/Time:  2022 1:01 PM    Patient PCP: Pratima Abebe MD  ______________________________________________________________________             Subjective:     CHIEF COMPLAINT:     Sepsis    HISTORY OF PRESENT ILLNESS:       Patient is a 78y.o. year old female   78years old female with significant past medical history of CVA with PEG tube chronic kidney disease CVA with right-sided weakness bedbound DVT of the left great toe status post removal of the left great and second toe history of aspiration pneumonia history of sacral decubitus ulcer    Patient was transferred to hospice service/    Past Medical History:   Diagnosis Date    Anemia     Chronic kidney disease     Diabetes mellitus ([facility]     Hemiplegia affecting dominant side, post-stroke ([facility] 2022    HTN (hypertension)     Hyperkalemia     Hyperlipidemia     Ill-defined condition     Neuropathy     PAD (peripheral artery disease) (HCC)     PCI    Sciatica     Stroke ([facility]     UTI (urinary tract infection)         Past Surgical History:   Procedure Laterality Date    HX AMPUTATION Right     great toe    HX CERVICAL FUSION      HX GI      HX OTHER SURGICAL Bilateral     Stent placement    HX OTHER SURGICAL      HX VASCULAR STENT Bilateral     2 in right 1 in left    HX VASCULAR STENT Bilateral     IR INSERT NON TUNL CVC OVER 5 YRS  2022       Social History     Tobacco Use    Smoking status: Never Smoker    Smokeless tobacco: Never Used   Substance Use Topics    Alcohol use: Not Currently        Family History   Problem Relation Age of Onset    Cancer Mother     Diabetes Mother     Hypertension Mother        No Known Allergies     Prior to Admission medications    Medication Sig Start Date End Date Taking? Authorizing Provider   traMADoL (ULTRAM) 50 mg tablet Take 25 mg by mouth every twelve (12) hours as needed for Pain. Provider, Historical   fenofibrate nanocrystallized (Tricor) 48 mg tablet Take 48 mg by mouth. Indications: excessive fat in the blood    Provider, Historical   cholestyramine-aspartame (QUESTRAN LIGHT) 4 gram packet Take 1 Packet by mouth two (2) times daily (with meals). 5/4/22   Adria Soto MD   albuterol-ipratropium (DUO-NEB) 2.5 mg-0.5 mg/3 ml nebu 3 mL by Nebulization route every six (6) hours as needed for Wheezing or Shortness of Breath. 4/19/22   Adria Soto MD   hydrALAZINE (APRESOLINE) 50 mg tablet Take 1 Tablet by mouth three (3) times daily. 4/19/22   Adria Soto MD   metoprolol tartrate (LOPRESSOR) 50 mg tablet Take 1 Tablet by mouth two (2) times a day. 4/19/22   Adria Soto MD   gabapentin (NEURONTIN) 300 mg capsule Take 1 Capsule by mouth two (2) times a day. Max Daily Amount: 600 mg. 3/14/22   Reyes Stein MD   insulin glargine (LANTUS) 100 unit/mL injection 50 Units by SubCUTAneous route daily. 3/14/22   Reyes Stein MD   acidophilus-pectin, citrus 25 million cell -100 mg tab tablet Take 2 Tablets by mouth daily. 3/15/22   Ilana JACKSON MD   amLODIPine (NORVASC) 5 mg tablet Take 1 Tablet by mouth daily. 3/14/22   Reyes Stein MD   artificial saliva (MOUTH KOTE) spra Take 1 Spray by mouth three (3) times daily. 3/14/22   Ilana JACKSON MD   latanoprost (XALATAN) 0.005 % ophthalmic solution Administer 1 Drop to right eye every evening. 3/14/22   Reyes Stein MD   atorvastatin (LIPITOR) 20 mg tablet TAKE 1 TABLET BY MOUTH EVERY NIGHT 2/3/22   Basilio Shukla MD   brimonidine (ALPHAGAN) 0.2 % ophthalmic solution Administer 1 Drop to both eyes three (3) times daily. Provider, Historical   aspirin delayed-release 81 mg tablet Take  by mouth daily. Provider, Historical   ferrous sulfate 325 mg (65 mg iron) tablet Take 1 Tab by mouth two (2) times a day.  12/19/17   Basilio Shukla MD         Current Facility-Administered Medications:   [address] [place] [address] LORazepam (ATIVAN) injection 1 mg, 1 mg, IntraVENous, Q15MIN PRN, Israel Soto MD    ketorolac (TORADOL) injection 30 mg, 30 mg, IntraVENous, Q8H PRN, Israel Soto MD    glycopyrrolate (ROBINUL) injection 0.4 mg, 0.4 mg, IntraVENous, Q4H, Israel Soto MD, 0.4 mg at 06/17/22 1003    bisacodyL (DULCOLAX) suppository 10 mg, 10 mg, Rectal, DAILY PRN, Sumi Soto MD    HYDROmorphone (DILAUDID) injection 1 mg, 1 mg, IntraVENous, Q15MIN PRN, Israel Soto MD    HYDROmorphone (DILAUDID) injection 1 mg, 1 mg, IntraVENous, Q4H, Israel Soto MD, 1 mg at 06/17/22 1003    LORazepam (ATIVAN) injection 1 mg, 1 mg, IntraVENous, Q4H, Israel Soto MD, 1 mg at 06/17/22 0930    saline peripheral flush soln 5 mL, 5 mL, InterCATHeter, PRN, Israel Soto MD    scopolamine (TRANSDERM-SCOP) 1 mg over 3 days 1 Patch, 1 Patch, TransDERmal, Q72H, Israel Soto MD, 1 Patch at 06/16/22 1500    LAB DATA REVIEWED:    Recent Results (from the past 24 hour(s))   VANCOMYCIN, RANDOM    Collection Time: 06/16/22  2:57 PM   Result Value Ref Range    Vancomycin, random 14.9 ug/mL    Reported dose date Not provided      Reported dose: Not provided Units   GLUCOSE, POC    Collection Time: 06/16/22  3:58 PM   Result Value Ref Range    Glucose (POC) 236 (H) 65 - 117 mg/dL    Performed by Subhash Carroll        XR Results (most recent):  Results from [place] encounter on 05/14/22    XR CHEST PORT    Narrative  Semiupright portable radiograph chest 4:32 p.m. compared to Vivien 10, 2022. INDICATION: New fever. Heart size remains enlarged. Shallow depth of inspiration. There is increasing  airspace disease or atelectasis in the right upper lobe abutting the minor  fissure overall decreasing interstitial markings throughout both lungs. Cannot  exclude a small left pleural effusion. No pneumothorax. No acute bony findings. Impression  1.  Shallow depth of inspiration with improving interstitial markings but new  area of atelectasis or infiltrate in the right upper lobe adjacent to the minor  fissure. No orders to display        Review of Systems:  Unable to obtain    Objective:   VITALS:    Visit Vitals  /69 (BP 1 Location: Right upper arm, BP Patient Position: At rest)   Pulse 69   Temp 98 °F (36.7 °C)   Resp 20   Ht 5' 7\" (1.702 m)   Wt 97.4 kg (214 lb 11.7 oz)   SpO2 97%   BMI 33.63 kg/m²       Physical Exam:   Constitutional: Resting comfortably HENT:   Head: Normocephalic and atraumatic. Eyes: Pupils are equal, round, and reactive to light. EOM are normal.   Cardiovascular: Normal rate, regular rhythm and normal heart sounds. Pulmonary/Chest: Breath sounds normal. No wheezes. No rales. Exhibits no tenderness. Abdominal: Soft. Bowel sounds are normal. There is no abdominal tenderness. There is no rebound and no guarding. Musculoskeletal: Normal range of motion. Neurological: Resting comfortably    ASSESSMENT & PLAN:    Acute respiratory failure extubated on nasal cannula   sacral decubitus ulcer postdebridement  Sepsis with leukocytosis elevated procalcitonin and CRP  Left foot wound  Recent removal of left great toe and second toe  CVA with PEG tube placement  History of aspiration pneumonia  History of chronic kidney disease  CAD with ejection fraction about 20 to 25%  Hypertension  Hyperlipidemia  Anemia of chronic disease  Hyperkalemia  Static post transfusion  Uncontrolled diabetes  Hepatic shock/improving  Coagulopathy  Elevated  Troponin  Bacteremia with coagulase-negative Staphylococcus  Sacral wound secondary to Acinetobacter  and Enterococcus  Procedure:  1.  Left transmetatarsal amputation. 2.  Sacral wound excisional wound debridement 13 x 15 cm to the bone.       Continue comfort care with hospice  Discussed with the patient daughter at the bedside          ________________________________________________________________________    Signed: Vinita Terrell MD

## 2022-06-17 NOTE — PROGRESS NOTES
CM reviewed chart. Patient is admitted under [facility]. CM will continue to follow.

## 2022-06-17 NOTE — PROGRESS NOTES
[address] Visit Note:      This LMSW spoke with pt's daughter Justino Barahona via telephone to provide support. Justino Barahona stated she and family are doing okay. Justino Barahona currently navigating through pt's paperwork and documents. No needs at this time. LMSW provided supportive counseling. LMSW will follow up with family on Monday and plan visit for early next week.       Juanito Palomo, [facility]    [phone]

## 2022-06-17 NOTE — PROGRESS NOTES
Progress Note    Patient: Neva Church MRN: [id number]  SSN: [id number]    YOB: 1945  Age: 78 y.o. Sex: female      Admit Date: 6/16/2022    LOS: 1 day     Subjective:     78years old female with significant past medical history of CVA with PEG tube chronic kidney disease CVA with right-sided weakness bedbound DVT of the left great toe status post removal of the left great and second toe history of aspiration pneumonia history of sacral decubitus ulcer patient was recently discharged from the hospitalPatient discharged to nursing home upon p.o. Cipro     Admitted again yesterday concerning for worsening of sacral ulcer     During last admission patient was seen by infectious disease and also seen by the vascular surgeon patient had debridement of wound during the last admission     Sacral wound infection recent cultures growing Pseudomonas resistant to Zosyn and meropenem     5/17     Patient alert awake not in distress  Patient was seen by the vascular surgeon     Vascular surgery planned for laparoscopic loop colostomy placement and sacral debridement then wound vacuum  Also try to close the wound on the left foot with TMA     5/18     Resting in the bed not in any distress  Blood sugars running high     Seen by infectious disease continue vancomycin and start on Unasyn     5/19     And went to the OR intubated because of elevated LFT and elevated INR  Surgery was canceled     On ventilator 40% O2  Propofol drip     5/20     Patient on ventilator with 30% O2  On propofol drip     Hemoglobin dropped to 6.8     Patient's daughter at the bedside     5/20     Patient on ventilator 30% O2  On propofol drip  Getting PEG tube feeding     5/21     Patient still on ventilator 30% O2  On propofol drip  Liver Function improving     5/22  On ventilator with 30% O2 on propofol drip     5/24  Awaiting tissue culture results.    On ventilator with 30% O2 on propofol drip  Left transmetatarsal amputation and Sacral wound excisional wound debridement 13 x 15 cm to the bone completed yesterday. CXR: Hazy mid and lower lung reticular markings.  Dependent small to moderate volume,  right greater than left pleural effusions  Remarkable labs   WBC: 15.2   Hgb: 8.5   Na; 148  CRP: 13.60   Procal: 0.71        5/25     Patient on ventilator with 30% O2  Seen by the vascular surgeon and plan for surgery today     5/26  Patient on ventilator with 30% O2  Hypotensive on Levophed  On propofol drip     Surgery was canceled yesterday because patient unstable hemodynamically not cleared by the anesthesia     5/27     Patient on ventilator with 30% O2  Hypotensive on Levophed  Getting PEG tube feeding     5/30      Patient on ventilator with 21% O2  On propofol drip  Off pressor     5/31     Patient on ventilator with 21% O2  On propofol drip     Off pressor  Patient have a wound vacuum        6/1     Patient scheduled for surgery today     6/2     Surgery was canceled yesterday  As overall prognosis very poor  Patient on vent on 21% O2  Getting PEG tube feeding  Wound vacuum intact        6/3     Patient still on ventilator  Seen by vascular surgeon left foot concern of ischemic changes     6/4     Patient extubated /tachypneic daughter at the bedside     6/5     Patient on BiPAP  30% O2     6/6     Patient on BiPAP 30% O2     Awake not in distress     6/7     Still on BiPAP 30% O2  Awake     6/8     Patient alert awake on BiPAP 30% O2 patient's daughter at the bedside  Still waiting for family decision regarding further treatment plan  Regarding hospice     6/9     Patient sleeping in the bed on BiPAP 30% percent O2  Wound vacuum in place     6/10     Patient off BiPAP on nasal cannula 3 L     6/11     On nasal cannula oxygen saturation 99%  Patient spiked fever 102.7 early this morning    6/17  Patient asleep in room with daughter in room- comfortable, non-distressed, laying on side  Daughter reports patient has been sleeping a lot since ativan and dilaudid started   Patient placed on hospice yesterday - DNR. Objective:     Vitals:    06/16/22 1950 06/17/22 0128 06/17/22 0528 06/17/22 0923   BP: (!) 148/76 (!) 110/46 129/69    Pulse: 64 64 69    Resp: 22 20 20    Temp: 98 °F (36.7 °C) 98.1 °F (36.7 °C) 98 °F (36.7 °C)    SpO2: 99% 100% 98% 97%   Weight:       Height:            Physical Exam:   Deferred due to patient sleeping. Lab/Data Review:  No results found for this or any previous visit (from the past 12 hour(s)). XR CHEST PORT   Final Result   1. Shallow depth of inspiration with improving interstitial markings but new   area of atelectasis or infiltrate in the right upper lobe adjacent to the minor   fissure.       XR CHEST PORT   Final Result       XR CHEST PORT   Final Result   1. There has been an improvement in the pulmonary edema pattern particularly   within the left perihilar region. There is persistent milder interstitial edema   within the right lung. 2.  This examination is negative for new pulmonary pathology or additional   interval changes.       XR CHEST PORT   Final Result       XR CHEST PORT   Final Result   Pulmonary vascular congestion and predominantly perihilar opacities,   increased.       XR CHEST PORT   Final Result   Worsening lung aeration.       XR CHEST PORT   Final Result   No significant change.       XR CHEST PORT   Final Result   No significant change.       XR CHEST PORT   Final Result       XR CHEST PORT   Final Result       XR CHEST PORT   Final Result   No interval change or acute process.       XR CHEST PORT   Final Result   ET tube retracted from previous, with tip now at the level of the   thoracic inlet, 8-9 cm above the jennifer. Stable appearance of right central   line. Stable mild enlargement of cardiopericardial silhouette. No evidence of   pulmonary edema, air space pneumonia, or pleural effusion. No evidence of   pneumothorax.  Some structures are partially obscured by overlying monitoring   electrodes.       XR CHEST PORT   Final Result   Basilar airspace disease, possibly subsegmental atelectasis, stable.       XR CHEST PORT   Final Result   Cardiomegaly with vascular congestion. Stable appearance of ET tube. Right central line placed, without radiographic evidence of complication.       IR INSERT NON TUNL CVC OVER 5 YRS   Final Result   Successful placement of a triple-lumen central venous catheter. The   catheter is ready for use.       XR CHEST PORT   Final Result       XR CHEST PORT   Final Result       XR CHEST PORT   Final Result       CT ABD PELV WO CONT   Final Result   Third spacing of fluids with small volume ascites, small-moderate   pleural effusions, and mild body wall edema. Bibasilar atelectasis.       XR CHEST PORT   Final Result   Similar findings compared to single view chest 1 day earlier.       XR CHEST PORT   Final Result   Findings/IMPRESSION: Stable appearance of endotracheal tube. Stable mild   enlargement of cardiomediastinal silhouette. Central vascular congestion with   bilateral perihilar and basilar opacities, consistent with edema and/or   pneumonia, right greater than left. Possible right pleural effusion. No   pneumothorax.       XR CHEST PORT   Final Result   Bibasilar opacities, possibly increased on the right.       XR CHEST PORT   Final Result   No significant change allowing for difference in technique and   positioning.       Single portable AP view compared to yesterday. Suboptimal inspiratory film   compromises visualization of the lower lung fields. Monitoring equipment   overlies the body. The tip of the tube is approximately 3 cm above the jennifer. Mild apparent cardiomegaly. Left heart border and left diaphragm obscured. Hazy airspace opacification both lung bases may be a combination of atelectasis,   pneumonia, or edema. No large effusion.    Negative for pneumothorax.       XR CHEST PORT   Final Result   No significant change allowing for difference in technique and   positioning.       Single portable AP view compared to yesterday. Rotation to the right. Monitoring   equipment overlies the body. Suboptimal inspiratory film compromises   visualization of the lower lung fields. Mild cardiomegaly. The tip of the ET tube is approximately 3 cm above the jennifer. Mild basal interstitial edema. No new consolidation. No pleural effusion or   pneumothorax.       XR CHEST PORT   Final Result   1. The endotracheal tube is in satisfactory position. 2.  There has been a partial interval resolution in the pulmonary interstitial   edema pattern.       XR CHEST PORT   Final Result   Ill-defined haziness in the mid to lower lung zones suspect for mild   atelectatic changes and/or interstitial fluid or pleural fluid.       US ABD LTD   Final Result   1. Question pericholecystic fluid. No identified gallstones or sludge. 2. Right pleural effusion. 3. Increased right renal echotexture for medical renal disease.       XR CHEST PORT   Final Result   Intubation.   Findings suggesting hydrostatic edema.       XR CHEST PORT   Final Result   No evidence of an acute cardiopulmonary process.       IR FLUORO GUIDE PLC CVAD    (Results Pending)   IR US GUIDED VASCULAR ACCESS    (Results Pending)             Recent Labs     06/15/22  0540 06/14/22  0547   WBC 15.7* 14.8*   HGB 9.8* 9.0*   HCT 33.4* 29.8*    363            Recent Labs     06/15/22  0540 06/14/22  1543 06/14/22  0547   *  --  154*   K 3.8  --  3.9   *  --  121*   CO2 27  --  27   BUN 31*  --  35*   CREA 0.77 0.89 0.91   *  --  279*   CA 9.0  --  8.9   MG 2.1  --   --    PHOS 2.2*  --   --           Recent Labs     06/14/22  0547   ALT 26      TBILI 0.4   TP 6.8   ALB 1.9*   GLOB 4.9*            Recent Labs     06/16/22  0420 06/15/22  0540 06/14/22  0547   INR 1.4* 1.6* 1.6*   PTP 17.6* 18.7* 18.7*         Assessment:   Acute respiratory failure extubated on nasal cannula   sacral decubitus ulcer postdebridement  Sepsis with leukocytosis elevated procalcitonin and CRP  Left foot wound  Recent removal of left great toe and second toe  CVA with PEG tube placement  History of aspiration pneumonia  History of chronic kidney disease  CAD with ejection fraction about 20 to 25%  Hypertension  Hyperlipidemia  Anemia of chronic disease  Hyperkalemia  Static post transfusion  Uncontrolled diabetes  Hepatic shock/improving  Coagulopathy  Elevated  Troponin  Bacteremia with coagulase-negative Staphylococcus  Sacral wound secondary to Acinetobacter  and Enterococcus  Procedure:  1.  Left transmetatarsal amputation.   2.  Sacral wound excisional wound debridement 13 x 15 cm to the bone.     Hypotension off  Levophed  Hypokalemia/replace potassium  Severe protein calorie malnutrition  Hypernatremia    Plan:   Monitor patient symptoms for improvement- overall prognosis poor  Continue hospice care treatment plan  Continue current medications      Current Facility-Administered Medications:     LORazepam (ATIVAN) injection 1 mg, 1 mg, IntraVENous, Q15MIN PRN, Israel Soto MD    ketorolac (TORADOL) injection 30 mg, 30 mg, IntraVENous, Q8H PRN, Israel Soto MD    glycopyrrolate (ROBINUL) injection 0.4 mg, 0.4 mg, IntraVENous, Q4H, Israel Soto MD, 0.4 mg at 06/17/22 1003    bisacodyL (DULCOLAX) suppository 10 mg, 10 mg, Rectal, DAILY PRN, Israel Soto MD    HYDROmorphone (DILAUDID) injection 1 mg, 1 mg, IntraVENous, Q15MIN PRN, Israel Soto MD    HYDROmorphone (DILAUDID) injection 1 mg, 1 mg, IntraVENous, Q4H, Israel Soto MD, 1 mg at 06/17/22 1003    LORazepam (ATIVAN) injection 1 mg, 1 mg, IntraVENous, Q4H, Israel Soto MD, 1 mg at 06/17/22 0930    saline peripheral flush soln 5 mL, 5 mL, InterCATHeter, PRN, Israel Soto MD    scopolamine (TRANSDERM-SCOP) 1 mg over 3 days 1 Patch, 1 Patch, TransDERmal, Q72H, Ludy Soto Skill, MD, 1 Patch at 06/16/22 1500        Signed By: Magdalene Blizzard     June 17, 2022

## 2022-06-17 NOTE — PROGRESS NOTES
Pt sleeping in bed with family sleeping in at bedside. Pt appears comfortable and non-distressed, laying on right side.

## 2022-06-17 NOTE — HOSPICE
[facility]  [phone]    [facility] Daily Nursing Note   Patient Name: Soheila Rodriguez  YOB: 1945  Age: 78 y.o. Date of Visit: 06/17/22  Facility of Care: [facility]  Patient Room: 570/01     Hospice Attending: Luci Koehler MD  Hospice Diagnosis: Sepsis ([address] [A41.9]    Level of Care: GIP    Current GIP Symptoms      1. Shortness of breath  2. Non-verbal signs of pain  3. Airway secretions  4. Unresponsive          ASSESSMENT & PLAN     1. Continue GIP LOC for symptom management (pain, dyspnea, secretions)  2. Hydromorphone 1 mg IV every 4 hours and prn  3. Lorazepam 1 mg IV every 4 hours and prn  4. Robinul 0.4 mg IV every 4 hours  5. Scopolamine transderm 1 mg every 72 hours  6. Other comfort meds as needed for fever, etc.  7. Continue to educate/support family with EOL process    Spiritual Interventions: Hospital and hospice chaplains to provide spiritual support    Psych/ Social/ Emotional Interventions: Family at bedside 24/7. SW to continue to provide emotional support to family. Care Coordination Needs: Frequent ongoing communication with family, hospital staff and hospice team    Care plan and New Orders discussed / approved with Delfina Hurley MD.    Description History and Chart Review     Narrative History of last 24 hours that demonstrates care cannot be provided in another setting:  Patient requires frequent nursing assessment/intervention for management of IV medications that cannot be administered in the home. Patient acuity prohibits transport at this time. What has been done to control the patient's symptoms in the last 24 hours? Scheduled Hydromorphone, Lorazepam and Robinul every 4 hours    Does the patient currently require IV medications? Yes  Does the patient currently require scheduled medications? Yes  Does the patient currently require a PCA?  No    List number of doses of PRN medications in last 24 hours:  Medication 1:  Number of doses: Medication 2:   Number of doses:    Medication 3:   Number of doses:    Supporting documentation for GIP need for pain control:  [x] Frequent evaluation by a doctor, nurse practitioner, nurse   [x] Frequent medication adjustment    [x] IVs that cannot be administered at home   [] Aggressive pain management   [] Complicated technical delivery of medications              Supporting documentation for GIP need for symptom control:  [x]  Sudden decline necessitating intensive nursing intervention  []  Uncontrolled / intractable nausea or vomiting   []  Pathological fractures  []  Advanced open wounds requiring frequent skilled care  [] Unmanageable respiratory distress  [] New or worsening delirium   [] Delirium with behavior issues: Is 24 hour caregiver present due to safety concerns with agitation? (yes/no)  [x] Imminent death - with skilled nursing needs documented above    DISCHARGE PLANNING     1. Discharge Plan: Discharge to LTC should condition stabilize; will most likely  in hospital  2. Patient/Family teaching: EOL process/symptom management  3. Response to patient/family teaching: Acknowledge understanding. Daughter reports feels patient is comfortable. ASSESSMENT    KARNOFSKY:  10    Prognosis estimated based on 22 clinical assessment is:   [] Few to Many Hours  [x] Hours to Days   [] Few to Many Days   [] Days to Weeks   [] Few to Many Weeks   [] Weeks to Months   [] Few to Many Months    Quality Measure: Patient self-reports:  [] Yes    [x] No    ESAS:   Time of Assessment: 10:00  Pain (1-10): 4  Fatigue (1-10):   Shortness of breath (1-10): 4  Nausea (1-10): Appetite (1-10):    Anxiety: (1-10):   Depression: (1-10):   Well-being: (1-10):   Constipation: _ Yes  _ No  LAST BM:     CLINICAL INFORMATION   Patient Vitals for the past 12 hrs:   Temp Pulse Resp BP SpO2   22 0923 -- -- -- -- 97 %   22 0528 98 °F (36.7 °C) 69 20 129/69 98 %   22 0128 98.1 °F (36.7 °C) 64 20 (!) 110/46 100 %       Currently this patient has:  [x] Supplemental O2   [x] IV    [] PICC      [] PORT   [] NG Tube    [x] PEG Tube   [] Ostomy     [x] Bell draining yellow urine  [] Other:     SIGNS/PHYSICAL FINDINGS     Skin (including wound):  [] Warm, dry, supple, intact and color normal for race  [x] Warm   [] Dry   [] Cool     [] Clammy       [] Diaphoretic    Turgor   [] Normal   [x] Decreased  Color:   [] Pink   [] Pale   [] Cyanotic   [] Erythema   [] Jaundice   [x] Normal for Race  [x]  Wounds    Neuro:  [] Lethargy  [] Restlessness / agitation  [] Confusion / delirium  [] Hallucinations  [] Responds to maximal stimulation  [x] Unresponsive  [] Seizures     Cardiac:  [] Dyspnea on Exertion  [] JVD  [] Murmur  [] Palpitations  [] Hypotension  [] Hypertension  [] Tachycardia  [] Bradycardia  [] Irregular HR  [] Pulses Decreased  [] Pulses Absent  [] Edema:        [] Mottling:          Respiratory:  Breath sounds:    [x] Diminished   [] Wheeze   [x] Rhonchi   [] Rales   [] Even and unlabored  [x] Labored  [] Cough   [] Non Productive   [] Productive    [] Description:           [x] Oral suction required  [x] O2 at 1 LPM  [] High flow oxygen greater than 10 LPM  [] Bi-Pap    GI  [] Abdomen (describe)   [] Ascites  [] Nausea  [] Vomiting  [x] Incontinent of bowels  [x] Bowel sounds normoactive  [] Diarrhea  [] Constipation (see above including last bowel movement)  [] Checked for impaction  [x] Last BM 6/16    Nutrition  Diet: NPO  Appetite  [] Good   [] Fair   [] Poor   [] Tube Feeding       [] Voiding  [] Incontinent   [x] Bell    Musculoskeletal  [] Balance/[place] Unsteady   [] Weak   Strength:    [] Normal    [] Limited    [] Decreasing   Activities:    [] Up as tolerated   [x] Bedridden    [] Specify:    SAFETY  [x] 24 hr. Caregiver   [x] Side rails ?     [x] Hospital bed   [x] Reviewed Falls & Safety       ALLERGIES AND MEDICATIONS     Allergies: No Known Allergies    Current Facility-Administered Medications   Medication Dose Route Frequency    LORazepam (ATIVAN) injection 1 mg  1 mg IntraVENous Q15MIN PRN    ketorolac (TORADOL) injection 30 mg  30 mg IntraVENous Q8H PRN    glycopyrrolate (ROBINUL) injection 0.4 mg  0.4 mg IntraVENous Q4H    bisacodyL (DULCOLAX) suppository 10 mg  10 mg Rectal DAILY PRN    HYDROmorphone (DILAUDID) injection 1 mg  1 mg IntraVENous Q15MIN PRN    HYDROmorphone (DILAUDID) injection 1 mg  1 mg IntraVENous Q4H    LORazepam (ATIVAN) injection 1 mg  1 mg IntraVENous Q4H    saline peripheral flush soln 5 mL  5 mL InterCATHeter PRN    scopolamine (TRANSDERM-SCOP) 1 mg over 3 days 1 Patch  1 Patch TransDERmal Q72H          Visit Time In: 10:00  Visit Time Out: 10:45

## 2022-06-17 NOTE — HOSPICE
This was an initial visit to assess needs and offer support. Pt's daughter was at bedside. Introduced myself, my role as  and my desire to support pt and family. Her daughter was sitting quietly wiping away tears flowing from time to time. As I sit with her, I affirmed the pain/grief and encouraged her to allow the tears as possible healing agents. Offered supportive listening as she shared her reflection on her mother's illness and events leading up to today. She engaged in life review/ legacy sharing as she shared family stories and pictures. Provided support to support her as she is supporting her mother and family on this stage of her mother's journey. Concluded visit with prayers and assurance of pastoral care continuing availability. She expressed her thanks for the visit. Please call or page as needed. Thanks for the opportunity to  to this daughter and mother.

## 2022-06-18 NOTE — PROGRESS NOTES
Checked on patient throughout the day. She is/has been resting comfortably. Scheduled medications given as ordered. Bed changed and patient bathed. All questions and concerns from family were addressed.

## 2022-06-18 NOTE — HOSPICE
[facility]  [phone]    [facility] Daily Nursing Note   Patient Name: James Watts  YOB: 1945  Age: 78 y.o. Date of Visit: 06/18/22  Facility of Care: [facility]  Patient Room: 570/01     Hospice Attending: Sesar Oviedo MD  Hospice Diagnosis: Sepsis ([facility] [address] [A41.9]    Level of Care: [facility]    Current GIP Symptoms      1. Shortness of breath- improved on current regimen  2. Non-verbal signs of pain- improved on current regimen  3. Airway secretions- improved on current regimen  4. Unresponsive          ASSESSMENT & PLAN     1. Continue GIP LOC for symptom management (pain, dyspnea, secretions)  2. Hydromorphone 1 mg IV every 4 hours and prn  3. Lorazepam 1 mg IV every 4 hours and prn  4. Robinul 0.4 mg IV every 4 hours  5. Scopolamine transderm 1 mg every 72 hours  6. Other comfort meds as needed for fever, etc.  7. Continue to educate/support family with EOL process    Spiritual Interventions: Hospital and hospice chaplains to provide spiritual support    Psych/ Social/ Emotional Interventions: Family at bedside 24/7. SW to continue to provide emotional support to family. Care Coordination Needs: Frequent ongoing communication with family, hospital staff and hospice team    Care plan and New Orders discussed / approved with Alesia Segura MD.    Description History and Chart Review     Narrative History of last 24 hours that demonstrates care cannot be provided in another setting:  Patient requires frequent nursing assessment/intervention for management of IV medications that cannot be administered in the home. Patient acuity prohibits transport at this time. What has been done to control the patient's symptoms in the last 24 hours? Scheduled Hydromorphone, Lorazepam and Robinul every 4 hours    Does the patient currently require IV medications? Yes  Does the patient currently require scheduled medications? Yes  Does the patient currently require a PCA? No    List number of doses of PRN medications in last 24 hours:  Medication 1:  Number of doses:    Medication 2:   Number of doses:    Medication 3:   Number of doses:    Supporting documentation for GIP need for pain control:  [x] Frequent evaluation by a doctor, nurse practitioner, nurse   [x] Frequent medication adjustment    [x] IVs that cannot be administered at home   [] Aggressive pain management   [] Complicated technical delivery of medications              Supporting documentation for GIP need for symptom control:  [x]  Sudden decline necessitating intensive nursing intervention  []  Uncontrolled / intractable nausea or vomiting   []  Pathological fractures  []  Advanced open wounds requiring frequent skilled care  [] Unmanageable respiratory distress  [] New or worsening delirium   [] Delirium with behavior issues: Is 24 hour caregiver present due to safety concerns with agitation? (yes/no)  [x] Imminent death - with skilled nursing needs documented above    DISCHARGE PLANNING     1. Discharge Plan: Discharge to LTC should condition stabilize; will most likely  in hospital  2. Patient/Family teaching: EOL process/symptom management  3. Response to patient/family teaching: Acknowledge understanding. Daughter reports feels patient is comfortable. ASSESSMENT    KARNOFSKY:  10    Prognosis estimated based on 22 clinical assessment is:   [] Few to Many Hours  [x] Hours to Days   [] Few to Many Days   [] Days to Weeks   [] Few to Many Weeks   [] Weeks to Months   [] Few to Many Months    Quality Measure: Patient self-reports:  [] Yes    [x] No    ESAS:   Time of Assessment: 10:30  Pain (1-10): 2  Fatigue (1-10):   Shortness of breath (1-10): 4  Nausea (1-10): Appetite (1-10): Anxiety: (1-10):   Depression: (1-10):   Well-being: (1-10):   Constipation: _ Yes  _ No  LAST BM:     CLINICAL INFORMATION   No data found.     Currently this patient has:  [x] Supplemental O2   [x] IV    [] PICC [] PORT   [] NG Tube    [x] PEG Tube   [] Ostomy     [x] Bell draining yellow urine  [] Other:     SIGNS/PHYSICAL FINDINGS     Skin (including wound):  [] Warm, dry, supple, intact and color normal for race  [x] Warm   [] Dry   [] Cool     [] Clammy       [] Diaphoretic    Turgor   [] Normal   [x] Decreased  Color:   [] Pink   [] Pale   [] Cyanotic   [] Erythema   [] Jaundice   [x] Normal for Race  [x]  Wounds    Neuro:  [] Lethargy  [] Restlessness / agitation  [] Confusion / delirium  [] Hallucinations  [] Responds to maximal stimulation  [x] Unresponsive  [] Seizures     Cardiac:  [] Dyspnea on Exertion  [] JVD  [] Murmur  [] Palpitations  [] Hypotension  [] Hypertension  [] Tachycardia  [] Bradycardia  [] Irregular HR  [] Pulses Decreased  [] Pulses Absent  [] Edema:        [] Mottling:          Respiratory:  Breath sounds:    [x] Diminished   [] Wheeze   [x] Rhonchi   [] Rales   [] Even and unlabored  [x] Labored  [] Cough   [] Non Productive   [] Productive    [] Description:           [x] Oral suction required  [x] O2 at 1 LPM  [] High flow oxygen greater than 10 LPM  [] Bi-Pap    GI  [] Abdomen (describe)   [] Ascites  [] Nausea  [] Vomiting  [x] Incontinent of bowels  [x] Bowel sounds normoactive  [] Diarrhea  [] Constipation (see above including last bowel movement)  [] Checked for impaction  [x] Last BM 6/17    Nutrition  Diet: NPO  Appetite  [] Good   [] Fair   [] Poor   [] Tube Feeding       [] Voiding  [] Incontinent   [x] Bell    Musculoskeletal  [] Balance/[place] Unsteady   [] Weak   Strength:    [] Normal    [] Limited    [] Decreasing   Activities:    [] Up as tolerated   [x] Bedridden    [] Specify:    SAFETY  [x] 24 hr. Caregiver   [x] Side rails ?     [x] Hospital bed   [x] Reviewed Falls & Safety       ALLERGIES AND MEDICATIONS     Allergies: No Known Allergies    Current Facility-Administered Medications   Medication Dose Route Frequency    LORazepam (ATIVAN) injection 1 mg  1 mg IntraVENous Q15MIN PRN    ketorolac (TORADOL) injection 30 mg  30 mg IntraVENous Q8H PRN    glycopyrrolate (ROBINUL) injection 0.4 mg  0.4 mg IntraVENous Q4H    bisacodyL (DULCOLAX) suppository 10 mg  10 mg Rectal DAILY PRN    HYDROmorphone (DILAUDID) injection 1 mg  1 mg IntraVENous Q15MIN PRN    HYDROmorphone (DILAUDID) injection 1 mg  1 mg IntraVENous Q4H    LORazepam (ATIVAN) injection 1 mg  1 mg IntraVENous Q4H    saline peripheral flush soln 5 mL  5 mL InterCATHeter PRN    scopolamine (TRANSDERM-SCOP) 1 mg over 3 days 1 Patch  1 Patch TransDERmal Q72H

## 2022-06-18 NOTE — ROUTINE PROCESS
{[facility] BEDSIDE_[facility]_ shift change report given to Jean-Pierre Reilly RN (oncoming nurse) by Carlos Avina RN (offgoing nurse).  Report included the following information from [employer]

## 2022-06-18 NOTE — PROGRESS NOTES
Progress Note    Patient: Soheila Rodriguez MRN: [id number]  SSN: [id number]    YOB: 1945  Age: 78 y.o. Sex: female      Admit Date: 6/16/2022    LOS: 2 days     Subjective:        Patient is a 78y.o. year old female   78years old female with significant past medical history of CVA with PEG tube chronic kidney disease CVA with right-sided weakness bedbound DVT of the left great toe status post removal of the left great and second toe history of aspiration pneumonia history of sacral decubitus ulcer     Patient was transferred to hospice service/     6/18  Patient asleep in bed, NAD with family at bedside who report no changes  Family members met with          Objective:     Vitals:    06/17/22 0128 06/17/22 0528 06/17/22 0923 06/17/22 2143   BP: (!) 110/46 129/69  (!) 148/69   Pulse: 64 69  80   Resp: 20 20  15   Temp: 98.1 °F (36.7 °C) 98 °F (36.7 °C)  98.3 °F (36.8 °C)   SpO2: 100% 98% 97% 99%   Weight:       Height:            Intake and Output:  Current Shift: No intake/output data recorded. Last three shifts: 06/16 1901 - 06/18 0700  In: -   Out: 1100 [Urine:1100]    Review of Symptoms:  Unable to obtain. Physical Exam:   Constitutional:  Normal appearance. HENT:      Head: Normocephalic and atraumatic. Nose: Nose normal.      Mouth/Throat:      Mouth: Mucous membranes are moist.      Pharynx: Oropharynx is clear. Eyes: PERRLA. Conjunctivae normal.   Cardiovascular:      Rate and Rhythm: Normal rate and regular rhythm. Pulses: Normal pulses. Heart sounds: Normal heart sounds. Pulmonary:      Effort: Pulmonary effort is normal.      Breath sounds: Normal breath sounds. Abdominal:      General: Bowel sounds are normal. There is no distension. Palpations: Abdomen is soft. There is no mass. Tenderness: There is no abdominal tenderness. There is no guarding or rebound. Musculoskeletal:         General: Normal range of motion.       Cervical back: Normal range of motion. Skin:     General: Skin is warm and dry. Capillary refill normal.  Neurological:      General: No focal deficit present. Mental Status: She is alert and oriented to person, place, and time. Psychiatric:         Mood and Affect: Mood normal.         Behavior: Behavior normal.         Thought Content: Thought content normal.         Judgment: Judgment normal.     Lab/Data Review:       No results found for this or any previous visit (from the past 12 hour(s)). Assessment:   Acute respiratory failure extubated on nasal cannula   sacral decubitus ulcer postdebridement  Sepsis with leukocytosis elevated procalcitonin and CRP  Left foot wound  Recent removal of left great toe and second toe  CVA with PEG tube placement  History of aspiration pneumonia  History of chronic kidney disease  CAD with ejection fraction about 20 to 25%  Hypertension  Hyperlipidemia  Anemia of chronic disease  Hyperkalemia  Static post transfusion  Uncontrolled diabetes  Hepatic shock/improving  Coagulopathy  Elevated  Troponin  Bacteremia with coagulase-negative Staphylococcus  Sacral wound secondary to Acinetobacter  and Enterococcus  Procedure:  1.  Left transmetatarsal amputation.   2.  Sacral wound excisional wound debridement 13 x 15 cm to the bone.       Plan:     Continue comfort care with hospice  Discussed with patients daughters at bedside      Current Facility-Administered Medications:     LORazepam (ATIVAN) injection 1 mg, 1 mg, IntraVENous, Q15MIN PRN, Israel Soto MD    ketorolac (TORADOL) injection 30 mg, 30 mg, IntraVENous, Q8H PRN, Israel Soto MD    glycopyrrolate (ROBINUL) injection 0.4 mg, 0.4 mg, IntraVENous, Q4H, Israel Soto MD, 0.4 mg at 06/18/22 0529    bisacodyL (DULCOLAX) suppository 10 mg, 10 mg, Rectal, DAILY PRN, Israel Soto MD    HYDROmorphone (DILAUDID) injection 1 mg, 1 mg, IntraVENous, Q15MIN PRN, Dima Soto MD    HYDROmorphone (DILAUDID) injection 1 mg, 1 mg, IntraVENous, Q4H, Israel Soto MD, 1 mg at 06/18/22 0530    LORazepam (ATIVAN) injection 1 mg, 1 mg, IntraVENous, Q4H, Israel Soto MD, 1 mg at 06/18/22 0529    saline peripheral flush soln 5 mL, 5 mL, InterCATHeter, PRN, Israel Soto MD    scopolamine (TRANSDERM-SCOP) 1 mg over 3 days 1 Patch, 1 Patch, TransDERmal, Q72H, Israel Soto MD, 1 Patch at 06/16/22 1500    Signed By: Rae Rogel     June 18, 2022

## 2022-06-18 NOTE — PROGRESS NOTES
Progress Note    Patient: Elmer Jimenes MRN: [id number]  SSN: [id number]    YOB: 1945  Age: 78 y.o. Sex: female      Admit Date: 6/16/2022    LOS: 2 days     Subjective:        Patient is a 78y.o. year old female   78years old female with significant past medical history of CVA with PEG tube chronic kidney disease CVA with right-sided weakness bedbound DVT of the left great toe status post removal of the left great and second toe history of aspiration pneumonia history of sacral decubitus ulcer     Patient was transferred to hospice service/     6/18  Patient asleep in bed, NAD with family at bedside who report no changes  Family members met with          Objective:     Vitals:    06/17/22 0128 06/17/22 0528 06/17/22 0923 06/17/22 2143   BP: (!) 110/46 129/69  (!) 148/69   Pulse: 64 69  80   Resp: 20 20  15   Temp: 98.1 °F (36.7 °C) 98 °F (36.7 °C)  98.3 °F (36.8 °C)   SpO2: 100% 98% 97% 99%   Weight:       Height:            Intake and Output:  Current Shift: No intake/output data recorded. Last three shifts: 06/16 1901 - 06/18 0700  In: -   Out: 1100 [Urine:1100]    Review of Symptoms:  Unable to obtain. Physical Exam:   Constitutional:  Normal appearance. HENT:      Head: Normocephalic and atraumatic. Nose: Nose normal.      Mouth/Throat:      Mouth: Mucous membranes are moist.      Pharynx: Oropharynx is clear. Eyes: PERRLA. Conjunctivae normal.   Cardiovascular:      Rate and Rhythm: Normal rate and regular rhythm. Pulses: Normal pulses. Heart sounds: Normal heart sounds. Pulmonary:      Effort: Pulmonary effort is normal.      Breath sounds: Normal breath sounds. Abdominal:      General: Bowel sounds are normal. There is no distension. Palpations: Abdomen is soft. There is no mass. Tenderness: There is no abdominal tenderness. There is no guarding or rebound. Musculoskeletal:         General: Normal range of motion.       Cervical back: Normal range of motion. Skin:     General: Skin is warm and dry. Capillary refill normal.  Neurological:      General: No focal deficit present. Mental Status: She is alert and oriented to person, place, and time. Psychiatric:         Mood and Affect: Mood normal.         Behavior: Behavior normal.         Thought Content: Thought content normal.         Judgment: Judgment normal.     Lab/Data Review:       No results found for this or any previous visit (from the past 12 hour(s)). Assessment:   Acute respiratory failure extubated on nasal cannula   sacral decubitus ulcer postdebridement  Sepsis with leukocytosis elevated procalcitonin and CRP  Left foot wound  Recent removal of left great toe and second toe  CVA with PEG tube placement  History of aspiration pneumonia  History of chronic kidney disease  CAD with ejection fraction about 20 to 25%  Hypertension  Hyperlipidemia  Anemia of chronic disease  Hyperkalemia  Static post transfusion  Uncontrolled diabetes  Hepatic shock/improving  Coagulopathy  Elevated  Troponin  Bacteremia with coagulase-negative Staphylococcus  Sacral wound secondary to Acinetobacter  and Enterococcus  Procedure:  1.  Left transmetatarsal amputation.   2.  Sacral wound excisional wound debridement 13 x 15 cm to the bone.       Plan:     Continue comfort care with hospice  Discussed with patients daughters at bedside      Current Facility-Administered Medications:     LORazepam (ATIVAN) injection 1 mg, 1 mg, IntraVENous, Q15MIN PRN, Israel Soto MD    ketorolac (TORADOL) injection 30 mg, 30 mg, IntraVENous, Q8H PRN, Israel Soto MD    glycopyrrolate (ROBINUL) injection 0.4 mg, 0.4 mg, IntraVENous, Q4H, Israel Soto MD, 0.4 mg at 06/18/22 1111    bisacodyL (DULCOLAX) suppository 10 mg, 10 mg, Rectal, DAILY PRN, Israel Soto MD    HYDROmorphone (DILAUDID) injection 1 mg, 1 mg, IntraVENous, Q15MIN PRN, Pricila Soto MD    HYDROmorphone (DILAUDID) injection 1 mg, 1 mg, IntraVENous, Q4H, Israel Soto MD, 1 mg at 06/18/22 1111    LORazepam (ATIVAN) injection 1 mg, 1 mg, IntraVENous, Q4H, Israel Soto MD, 1 mg at 06/18/22 1112    saline peripheral flush soln 5 mL, 5 mL, InterCATHeter, PRN, Israel Soto MD    scopolamine (TRANSDERM-SCOP) 1 mg over 3 days 1 Patch, 1 Patch, TransDERmal, Q72H, Israel Soto MD, 1 Patch at 06/16/22 1500    Signed By: Edgar Cabello MD     June 18, 2022

## 2022-06-19 NOTE — PROGRESS NOTES
Progress Note    Patient: Rahul Arrieta MRN: [id number]  SSN: [id number]    YOB: 1945  Age: 78 y.o. Sex: female      Admit Date: 6/16/2022    LOS: 3 days     Subjective:        Patient is a 78y.o. year old female   78years old female with significant past medical history of CVA with PEG tube chronic kidney disease CVA with right-sided weakness bedbound DVT of the left great toe status post removal of the left great and second toe history of aspiration pneumonia history of sacral decubitus ulcer     Patient was transferred to hospice service/     6/18  Patient asleep in bed, NAD with family at bedside who report no changes  Family members met with      6/19    Resting comfortably family at the bedside        Objective:     Vitals:    06/17/22 2143 06/18/22 1030 06/18/22 2132 06/19/22 0727   BP: (!) 148/69 120/66 136/62 (!) 139/56   Pulse: 80 72 86 78   Resp: 15 14 14 16   Temp: 98.3 °F (36.8 °C) 98 °F (36.7 °C) 98.6 °F (37 °C) 97.9 °F (36.6 °C)   SpO2: 99% 95% 99% 97%   Weight:       Height:            Intake and Output:  Current Shift: No intake/output data recorded. Last three shifts: 06/17 1901 - 06/19 0700  In: -   Out: 300 [Urine:300]    Review of Symptoms:  Unable to obtain. Physical Exam:   Constitutional:  Normal appearance. HENT:      Head: Normocephalic and atraumatic. Nose: Nose normal.      Mouth/Throat:      Mouth: Mucous membranes are moist.      Pharynx: Oropharynx is clear. Eyes: PERRLA. Conjunctivae normal.   Cardiovascular:      Rate and Rhythm: Normal rate and regular rhythm. Pulses: Normal pulses. Heart sounds: Normal heart sounds. Pulmonary:      Effort: Pulmonary effort is normal.      Breath sounds: Normal breath sounds. Abdominal:      General: Bowel sounds are normal. There is no distension. Palpations: Abdomen is soft. There is no mass. Tenderness: There is no abdominal tenderness. There is no guarding or rebound. Musculoskeletal:         General: Normal range of motion. Cervical back: Normal range of motion. Skin:     General: Skin is warm and dry. Capillary refill normal.  Neurological:      General: No focal deficit present. Mental Status: She is alert and oriented to person, place, and time. Psychiatric:         Mood and Affect: Mood normal.         Behavior: Behavior normal.         Thought Content: Thought content normal.         Judgment: Judgment normal.     Lab/Data Review:       No results found for this or any previous visit (from the past 12 hour(s)). Assessment:   Acute respiratory failure extubated on nasal cannula   sacral decubitus ulcer postdebridement  Sepsis with leukocytosis elevated procalcitonin and CRP  Left foot wound  Recent removal of left great toe and second toe  CVA with PEG tube placement  History of aspiration pneumonia  History of chronic kidney disease  CAD with ejection fraction about 20 to 25%  Hypertension  Hyperlipidemia  Anemia of chronic disease  Hyperkalemia  Static post transfusion  Uncontrolled diabetes  Hepatic shock/improving  Coagulopathy  Elevated  Troponin  Bacteremia with coagulase-negative Staphylococcus  Sacral wound secondary to Acinetobacter  and Enterococcus  Procedure:  1.  Left transmetatarsal amputation.   2.  Sacral wound excisional wound debridement 13 x 15 cm to the bone.       Plan:     Continue comfort care with hospice  Discussed with patients daughters at bedside      Current Facility-Administered Medications:     LORazepam (ATIVAN) injection 1 mg, 1 mg, IntraVENous, Q15MIN PRN, Israel Soto MD    glycopyrrolate (ROBINUL) injection 0.4 mg, 0.4 mg, IntraVENous, Q4H, Israel Soto MD, 0.4 mg at 06/19/22 8876    bisacodyL (DULCOLAX) suppository 10 mg, 10 mg, Rectal, DAILY PRN, Israel Soto MD    HYDROmorphone (DILAUDID) injection 1 mg, 1 mg, IntraVENous, Q15MIN PRN, Israel Soto MD    HYDROmorphone (DILAUDID) injection 1 mg, 1 mg, IntraVENous, Q4H, Israel Soto MD, 1 mg at 06/19/22 7751    LORazepam (ATIVAN) injection 1 mg, 1 mg, IntraVENous, Q4H, Israel Soto MD, 1 mg at 06/19/22 0903    saline peripheral flush soln 5 mL, 5 mL, InterCATHeter, PRN, Israel Soto MD    scopolamine (TRANSDERM-SCOP) 1 mg over 3 days 1 Patch, 1 Patch, TransDERmal, Q72H, Israel Soto MD, 1 Patch at 06/16/22 1500    Signed By: David Davila MD     June 19, 2022

## 2022-06-20 NOTE — HOSPICE
[facility]  [phone]    GIP Daily Nursing Note   Patient Name: Raissa Husain  YOB: 1945  Age: 78 y.o. Date of Visit: 06/20/22  Facility of Care: [facility]  Patient Room: 570/01     Hospice Attending: Dulce Rivera MD  Hospice Diagnosis: Sepsis ([facility] [address] [A41.9]    Level of Care: GIP    Current GIP Symptoms      1. Non-verbal pain  2. Airway secretions  3. Unresponsive        ASSESSMENT & PLAN     1. Continue GIP for management of pain, secretions  2. Hydromorphone 1 mg IV every 4 hours and prn  3. Lorazepam 1 mg IV every 4 hours and prn  4. Robinul 0.4 mg IV every 4 hours; Scopolamine transdermal 1 mg every 3 days  5. Maintain contact precautions    Spiritual Interventions: Hospital and hospice chaplains providing spiritual support to family    Psych/ Social/ Emotional Interventions: Family holding kaur. SW supportive counseling provided to family at bedside    Care Coordination Needs: Daily communication with Lianet Chadwick and other family members at bedside. Ongoing communication with hospital staff and hospice team.    Care plan and New Orders discussed / approved with Logan Jeff MD.    Description History and Chart Review     Narrative History of last 24 hours that demonstrates care cannot be provided in another setting:  Patient continues to require frequent nursing assessment and management of IV meds that cannot be managed in home. What has been done to control the patient's symptoms in the last 24 hours? Scheduled IV Hydromorphone, Lorazepam and Robinul    Does the patient currently require IV medications? Yes  Does the patient currently require scheduled medications? Yes  Does the patient currently require a PCA?      List number of doses of PRN medications in last 24 hours:  Medication 1:  Number of doses:    Medication 2:   Number of doses:    Medication 3:   Number of doses:    Supporting documentation for GIP need for pain control:  [x] Frequent evaluation by a doctor, nurse practitioner, nurse   [] Frequent medication adjustment    [x] IVs that cannot be administered at home   [] Aggressive pain management   [] Complicated technical delivery of medications              Supporting documentation for GIP need for symptom control:  [x]  Sudden decline necessitating intensive nursing intervention  []  Uncontrolled / intractable nausea or vomiting   []  Pathological fractures  []  Advanced open wounds requiring frequent skilled care  [] Unmanageable respiratory distress  [] New or worsening delirium   [] Delirium with behavior issues: Is 24 hour caregiver present due to safety concerns with agitation? (yes/no)  [x] Imminent death - with skilled nursing needs documented above    DISCHARGE PLANNING     1. Discharge Plan: Transfer to [facility] when stable  2. Patient/Family teaching: Medication/symptom management  3. Response to patient/family teaching: Verbalized understanding    ASSESSMENT    KARNOFSKY:  10    Prognosis estimated based on 06/20/22 clinical assessment is:   [] Few to Many Hours  [] Hours to Days   [x] Few to Many Days   [] Days to Weeks   [] Few to Many Weeks   [] Weeks to Months   [] Few to Many Months    Quality Measure: Patient self-reports:  [] Yes    [x] No    ESAS:   Time of Assessment: 9:30  Pain (1-10): 4  Fatigue (1-10):   Shortness of breath (1-10):2  Nausea (1-10): Appetite (1-10):    Anxiety: (1-10):   Depression: (1-10):   Well-being: (1-10):   Constipation: _ Yes  _ No    CLINICAL INFORMATION   Patient Vitals for the past 12 hrs:   Temp Pulse Resp BP   06/20/22 1744 -- 83 18 (!) 176/78   06/20/22 0756 97.6 °F (36.4 °C) 76 18 (!) 111/52       Currently this patient has:  [x] Supplemental O2   [] IV    [] PICC      [] PORT   [] NG Tube    [x] PEG Tube   [] Ostomy     [x] Bell draining yellow urine  [] Other:      SIGNS/PHYSICAL FINDINGS     Skin (including wound):  [] Warm, dry, supple, intact and color normal for race  [x] Warm   [x] Dry [] Cool     [] Clammy       [] Diaphoretic    Turgor   [] Normal   [x] Decreased  Color:   [] Pink   [] Pale   [] Cyanotic   [] Erythema   [] Jaundice   [x] Normal for Race  [x]  Wounds: See Avatar    Neuro:  [] Lethargy  [] Restlessness / agitation  [] Confusion / delirium  [] Hallucinations  [] Responds to maximal stimulation  [x] Unresponsive  [] Seizures     Cardiac:  [] Dyspnea on Exertion  [] JVD  [] Murmur  [] Palpitations  [] Hypotension  [] Hypertension  [] Tachycardia  [] Bradycardia  [] Irregular HR  [] Pulses Decreased  [] Pulses Absent  [] Edema:        [] Mottling:         Respiratory:  Breath sounds:    [x] Diminished   [] Wheeze   [x] Rhonchi   [] Rales   [x] Even and unlabored  [] Labored:            [] Cough   [] Non Productive   [] Productive    [] Description:           [] Deep suctioned   [x] O2 at 1 LPM  [] High flow oxygen greater than 10 LPM  [] Bi-Pap    GI  [] Abdomen (describe)   [] Ascites  [] Nausea  [] Vomiting  [x] Incontinent of bowels  [x] Bowel sounds   [] Diarrhea  [] Constipation (see above including last bowel movement)  [] Checked for impaction  [x] Last BM 6/19    Nutrition  Diet: NPO  Appetite:   [] Good   [] Fair   [x] Poor   [] Tube Feeding       [] Voiding  [] Incontinent   [x] Bell    Musculoskeletal  [] Balance/Colton Unsteady   [] Weak   Strength:    [] Normal    [] Limited    [] Decreasing   Activities:    [] Up as tolerated   [x] Bedridden    [] Specify:    SAFETY  [x] 24 hr. Caregiver   [x] Side rails ?     [x] Hospital bed   [x] Reviewed Falls & Safety       ALLERGIES AND MEDICATIONS     Allergies: No Known Allergies    Current Facility-Administered Medications   Medication Dose Route Frequency    HYDROmorphone (DILAUDID) injection 1 mg  1 mg IntraVENous Q4H    HYDROmorphone (DILAUDID) injection 1 mg  1 mg IntraVENous Q15MIN PRN    LORazepam (ATIVAN) injection 1 mg  1 mg IntraVENous Q15MIN PRN    glycopyrrolate (ROBINUL) injection 0.4 mg  0.4 mg IntraVENous Q4H  bisacodyL (DULCOLAX) suppository 10 mg  10 mg Rectal DAILY PRN    LORazepam (ATIVAN) injection 1 mg  1 mg IntraVENous Q4H    saline peripheral flush soln 5 mL  5 mL InterCATHeter PRN    scopolamine (TRANSDERM-SCOP) 1 mg over 3 days 1 Patch  1 Patch TransDERmal Q72H          Visit Time In: 9:30  Visit Time Out: 10:10

## 2022-06-20 NOTE — PROGRESS NOTES
[address] Visit Note:      This LMSW and hospice liaison Julia Ricketts RN visited with pt at bedside. Several family members sitting kaur. Pt appearing to be resting peacefully. Hospice team met with pt's daughter Autumn Arndt and a granddaughter. Autumn Arndt reported being pleased with hospice care. Autumn Arndt stated she and family are wondering who else pt is waiting to see before death. LMSW educated that sometimes pts, especially matriarchs, do not want to pass in front of their children and grandchildren to protect them. Autumn Arndt reported pt is never alone and wondered if family should try to leave pt alone. No other needs at this time. LMSW provided supportive counseling in processing pt's prognosis. LMSW will continue to follow.       Ene Sawyer, [address] [facility]    [phone]

## 2022-06-20 NOTE — PROGRESS NOTES
Progress Note    Patient: Deanne Delgado MRN: [id number]  SSN: [id number]    YOB: 1945  Age: 78 y.o. Sex: female      Admit Date: 6/16/2022    LOS: 4 days     Subjective:   Patient followed for sacral wound infection with repeat culture growing Acinetobacter and Enterococci. Left foot wound also grew Acinetobacter, now status post left TMA. Patient now on hospice care and all antibiotics have been discontinued. Objective:     Vitals:    06/19/22 0727 06/20/22 0130 06/20/22 0756 06/20/22 1744   BP: (!) 139/56 128/62 (!) 111/52 (!) 176/78   Pulse: 78 81 76 83   Resp: 16 18 18 18   Temp: 97.9 °F (36.6 °C) 98 °F (36.7 °C) 97.6 °F (36.4 °C)    SpO2: 97% 96%     Weight:       Height:            Intake and Output:  Current Shift: No intake/output data recorded. Last three shifts: No intake/output data recorded. Physical Exam:    Vitals and nursing note reviewed. Constitutional:       General: She is not in acute distress. Appearance: She is ill-appearing. HENT: Nasal cannula O2 2L/min    Eyes: open        Cardiovascular:      Rate and Rhythm: Normal rate and regular rhythm. Heart sounds: No murmur heard. Pulmonary: clear bilaterally    Abdominal:      General: Bowel sounds are normal.      Palpations: Abdomen is soft. Tenderness: There is no abdominal tenderness. Comments: PEG site unremarkable today except mild exudate    Genitourinary:     Comments: Indwelling Bell with clear urine         Musculoskeletal:      Cervical back: Neck supple. Right lower leg: No edema. Left lower leg: No edema. No     Comments: Left foot surgical wound incision intact with no drainage  Skin:     Findings: No rash.              Comments: Sacral wound with normal granulation tissue peripherally but central slough  Neurological: nonverbal     Lab/Data Review:     WBC 15,700    ALT/AST 55 /14  Urinalysis with WBC 20-50, Budding yeasts    Procal 0.59 <0.67 <0.67 < 0.84 <0.64 <0.31 <0.26 <0.24 <0.31 <0.36  <0.59 <0.57  CRP 13.60 < 13.00 <10.90 <14.20 < 13.60 < 14.60 <11.40 <12.60  <18.90 <17.90    Blood cultures (5/14) Coagulase negative Staphylococci  Blood cultures (6/13) No growth so far  Urine culture (6/14) Pending  Sacral wound (5/14) Acinetobacter baumannii sensitive to Unasyn, Cefepime, Ceftazidime and Enterococcus faecium resistant to Ampicillin  Sacral wound (5/23) Mixed skin yasmine FINAL  Left great toe (5/15) Acinetobacter baumannii  Sputum culture (5/19) Rare normal respiratory yasmine FINAL       CXR (6/13) Shallow depth of inspiration with improving interstitial markings but new  area of atelectasis or infiltrate in the right upper lobe adjacent to the minor fissure    Assessment:     Active Problems:    Sepsis ([address] (4/4/2022)    1. Sacral wound infection secondary now to Acinetobacter baumannii and Enterococcus faecium, on Vancomycin and Unasyn, status post excisional wound debridement to the bone, Day #26 Vancomycin and Day #3 Avycaz  2. Sepsis with recurrent fever, worsening leukocytosis, increasing CRP. 3. Left foot wound, culture growing Acinetobacter baumannii, status post TMA  4. Possible ischemic hepatitiis with transaminitis following hypotensive episode, resolving  5. Positive blood cultures for Coagulase negative Staphylococci, probable contaminant  6. Hepatitis C antibody positive, resolved (negative HCV viral level)  7. New onset cardiomyopathy  8. Refractory Candida UTI with persistent pyuria and yeasts, Day #14 IV Fluconazole  9.  HOSPICE CARE       Plan:    No further treatment recommendations given hospice care  Signed By: Mazin Tariq MD     June 20, 2022

## 2022-06-20 NOTE — HOSPICE
[facility]  [phone]     Patient Name: Radha Richey  YOB: 1945  Age: 78 [facility].o. [facility]: Text received from daughter Neville Calix stating family did not want Hydromorphone increased although there is evidence (non-verbal pain) that patient is not comfortable.

## 2022-06-20 NOTE — PROGRESS NOTES
Progress Note    Patient: Britta Aquino MRN: [id number]  SSN: [id number]    YOB: 1945  Age: 78 y.o. Sex: female      Admit Date: 6/16/2022    LOS: 4 days     Subjective:        Patient is a 69 y. o. year old female   78years old female with significant past medical history of CVA with PEG tube chronic kidney disease CVA with right-sided weakness bedbound DVT of the left great toe status post removal of the left great and second toe history of aspiration pneumonia history of sacral decubitus ulcer     Patient was transferred to hospice service/     6/18  Patient asleep in bed, NAD with family at bedside who report no changes  Family members met with       6/19     Resting comfortably family at the bedside     6/20  Resting comfortably in bed, family at bedside. NAD    Objective:     Vitals:    06/18/22 2132 06/19/22 0727 06/20/22 0130 06/20/22 0756   BP: 136/62 (!) 139/56 128/62 (!) 111/52   Pulse: 86 78 81 76   Resp: 14 16 18 18   Temp: 98.6 °F (37 °C) 97.9 °F (36.6 °C) 98 °F (36.7 °C) 97.6 °F (36.4 °C)   SpO2: 99% 97% 96%    Weight:       Height:            Intake and Output:  Current Shift: No intake/output data recorded. Last three shifts: No intake/output data recorded. Review of Symptoms:  Unable to obtain    Physical Exam:   Constitutional:  Normal appearance. HENT:      Head: Normocephalic and atraumatic. Nose: Nose normal.      Mouth/Throat:      Mouth: Mucous membranes are moist.      Pharynx: Oropharynx is clear. Eyes: PERRLA. Conjunctivae normal.   Cardiovascular:      Rate and Rhythm: Normal rate and regular rhythm. Pulses: Normal pulses. Heart sounds: Normal heart sounds. Pulmonary:      Effort: Pulmonary effort is normal.      Breath sounds: Normal breath sounds. Abdominal:      General: Bowel sounds are normal. There is no distension. Palpations: Abdomen is soft. There is no mass. Tenderness: There is no abdominal tenderness.  There is no guarding or rebound. Musculoskeletal:         General: Normal range of motion. Cervical back: Normal range of motion. Skin:     General: Skin is warm and dry. Capillary refill normal.  Neurological:      General: No focal deficit present. Mental Status: She is alert and oriented to person, place, and time. Psychiatric:         Mood and Affect: Mood normal.         Behavior: Behavior normal.         Thought Content: Thought content normal.         Judgment: Judgment normal.     Lab/Data Review:    No results found for this or any previous visit (from the past 12 hour(s)). No results found. Assessment:     Acute respiratory failure extubated on nasal cannula   sacral decubitus ulcer postdebridement  Sepsis with leukocytosis elevated procalcitonin and CRP  Left foot wound  Recent removal of left great toe and second toe  CVA with PEG tube placement  History of aspiration pneumonia  History of chronic kidney disease  CAD with ejection fraction about 20 to 25%  Hypertension  Hyperlipidemia  Anemia of chronic disease  Hyperkalemia  Static post transfusion  Uncontrolled diabetes  Hepatic shock/improving  Coagulopathy  Elevated  Troponin  Bacteremia with coagulase-negative Staphylococcus  Sacral wound secondary to Acinetobacter  and Enterococcus  Procedure:  1.  Left transmetatarsal amputation. 2.  Sacral wound excisional wound debridement 13 x 15 cm to the bone.     Plan:   Continue comfort care with hospice  Discussed with patients daughters at bedside       Current Facility-Administered Medications:     LORazepam (ATIVAN) injection 1 mg, 1 mg, IntraVENous, Q15MIN PRN, Israel Soto MD    glycopyrrolate (ROBINUL) injection 0.4 mg, 0.4 mg, IntraVENous, Q4H, Israel Soto MD, 0.4 mg at 06/20/22 7255    bisacodyL (DULCOLAX) suppository 10 mg, 10 mg, Rectal, DAILY PRN, Israel Soto MD    HYDROmorphone (DILAUDID) injection 1 mg, 1 mg, IntraVENous, Q15MIN PRN, Cristian Nguyễn MD  [facility] HYDROmorphone (DILAUDID) injection 1 mg, 1 mg, IntraVENous, Q4H, Israel Soto MD, 1 mg at 06/20/22 0058    LORazepam (ATIVAN) injection 1 mg, 1 mg, IntraVENous, Q4H, Israel Soto MD, 1 mg at 06/20/22 0839    saline peripheral flush soln 5 mL, 5 mL, InterCATHeter, PRN, Israel Soto MD    scopolamine (TRANSDERM-SCOP) 1 mg over 3 days 1 Patch, 1 Patch, TransDERmal, Q72H, Meghan Soto MD, 1 Patch at 06/19/22 1832      Signed By: Karsten Bryan MD     June 20, 2022

## 2022-06-20 NOTE — PROGRESS NOTES
Progress Note    Patient: Maddy Enriquez MRN: [id number]  SSN: [id number]    YOB: 1945  Age: 78 y.o. Sex: female      Admit Date: 6/16/2022    LOS: 4 days     Subjective:        Patient is a 69 y. o. year old female   78years old female with significant past medical history of CVA with PEG tube chronic kidney disease CVA with right-sided weakness bedbound DVT of the left great toe status post removal of the left great and second toe history of aspiration pneumonia history of sacral decubitus ulcer     Patient was transferred to hospice service/     6/18  Patient asleep in bed, NAD with family at bedside who report no changes  Family members met with       6/19     Resting comfortably family at the bedside     6/20  Resting comfortably in bed, family at bedside. NAD    Objective:     Vitals:    06/18/22 2132 06/19/22 0727 06/20/22 0130 06/20/22 0756   BP: 136/62 (!) 139/56 128/62 (!) 111/52   Pulse: 86 78 81 76   Resp: 14 16 18 18   Temp: 98.6 °F (37 °C) 97.9 °F (36.6 °C) 98 °F (36.7 °C) 97.6 °F (36.4 °C)   SpO2: 99% 97% 96%    Weight:       Height:            Intake and Output:  Current Shift: No intake/output data recorded. Last three shifts: No intake/output data recorded. Review of Symptoms:  Unable to obtain    Physical Exam:   Constitutional:  Normal appearance. HENT:      Head: Normocephalic and atraumatic. Nose: Nose normal.      Mouth/Throat:      Mouth: Mucous membranes are moist.      Pharynx: Oropharynx is clear. Eyes: PERRLA. Conjunctivae normal.   Cardiovascular:      Rate and Rhythm: Normal rate and regular rhythm. Pulses: Normal pulses. Heart sounds: Normal heart sounds. Pulmonary:      Effort: Pulmonary effort is normal.      Breath sounds: Normal breath sounds. Abdominal:      General: Bowel sounds are normal. There is no distension. Palpations: Abdomen is soft. There is no mass. Tenderness: There is no abdominal tenderness.  There is no guarding or rebound. Musculoskeletal:         General: Normal range of motion. Cervical back: Normal range of motion. Skin:     General: Skin is warm and dry. Capillary refill normal.  Neurological:      General: No focal deficit present. Mental Status: She is alert and oriented to person, place, and time. Psychiatric:         Mood and Affect: Mood normal.         Behavior: Behavior normal.         Thought Content: Thought content normal.         Judgment: Judgment normal.     Lab/Data Review:    No results found for this or any previous visit (from the past 12 hour(s)). No results found. Assessment:     Acute respiratory failure extubated on nasal cannula   sacral decubitus ulcer postdebridement  Sepsis with leukocytosis elevated procalcitonin and CRP  Left foot wound  Recent removal of left great toe and second toe  CVA with PEG tube placement  History of aspiration pneumonia  History of chronic kidney disease  CAD with ejection fraction about 20 to 25%  Hypertension  Hyperlipidemia  Anemia of chronic disease  Hyperkalemia  Static post transfusion  Uncontrolled diabetes  Hepatic shock/improving  Coagulopathy  Elevated  Troponin  Bacteremia with coagulase-negative Staphylococcus  Sacral wound secondary to Acinetobacter  and Enterococcus  Procedure:  1.  Left transmetatarsal amputation. 2.  Sacral wound excisional wound debridement 13 x 15 cm to the bone.     Plan:   Continue comfort care with hospice  Discussed with patients daughters at bedside       Current Facility-Administered Medications:     LORazepam (ATIVAN) injection 1 mg, 1 mg, IntraVENous, Q15MIN PRN, Israel Soto MD    glycopyrrolate (ROBINUL) injection 0.4 mg, 0.4 mg, IntraVENous, Q4H, Israel Soto MD, 0.4 mg at 06/20/22 7157    bisacodyL (DULCOLAX) suppository 10 mg, 10 mg, Rectal, DAILY PRN, Israel Soto MD    HYDROmorphone (DILAUDID) injection 1 mg, 1 mg, IntraVENous, Q15MIN PRN, MD Mary Wilkins HYDROmorphone (DILAUDID) injection 1 mg, 1 mg, IntraVENous, Q4H, Israel Soto MD, 1 mg at 06/20/22 1053    LORazepam (ATIVAN) injection 1 mg, 1 mg, IntraVENous, Q4H, Israel Soto MD, 1 mg at 06/20/22 0839    saline peripheral flush soln 5 mL, 5 mL, InterCATHeter, PRN, Israel Soto MD    scopolamine (TRANSDERM-SCOP) 1 mg over 3 days 1 Patch, 1 Patch, TransDERmal, Q72H, Isreal Soto MD, 1 Patch at 06/19/22 1832      Signed By: Rick Flores     June 20, 2022

## 2022-06-21 NOTE — PROGRESS NOTES
Progress Note    Patient: Stella Rose MRN: [id number]  SSN: [id number]    YOB: 1945  Age: 78 y.o. Sex: female      Admit Date: 6/16/2022    LOS: 5 days     Subjective:   Patient is a 69 y. o. year old female   78years old female with significant past medical history of CVA with PEG tube chronic kidney disease CVA with right-sided weakness bedbound DVT of the left great toe status post removal of the left great and second toe history of aspiration pneumonia history of sacral decubitus ulcer     Patient was transferred to hospice service/     6/18  Patient asleep in bed, NAD with family at bedside who report no changes  Family members met with       6/19     Resting comfortably family at the bedside     6/20  Resting comfortably in bed, family at bedside. NAD    6/21  Comfortably in bed family at bedside no acute distress  Hospice met with family-decided not to increase hydromorphone  infectious disease discontinued all antibiotics    Objective:     Vitals:    06/20/22 0756 06/20/22 1744 06/21/22 0014 06/21/22 0832   BP: (!) 111/52 (!) 176/78 (!) 176/77 (!) 176/67   Pulse: 76 83 72 73   Resp: 18 18 18 18   Temp: 97.6 °F (36.4 °C)  97.7 °F (36.5 °C) 97.9 °F (36.6 °C)   SpO2:   99%    Weight:       Height:            Intake and Output:  Current Shift: No intake/output data recorded. Last three shifts: No intake/output data recorded. Review of Symptoms:  Unable to obtain. Physical Exam:   Constitutional:  Normal appearance. HENT:      Head: Normocephalic and atraumatic. Nose: Nose normal.      Mouth/Throat:      Mouth: Mucous membranes are moist.      Pharynx: Oropharynx is clear. Eyes: PERRLA. Conjunctivae normal.   Cardiovascular:      Rate and Rhythm: Normal rate and regular rhythm. Pulses: Normal pulses. Heart sounds: Normal heart sounds. Pulmonary:      Effort: Pulmonary effort is normal.      Breath sounds: Normal breath sounds.    Abdominal: General: Bowel sounds are normal. There is no distension. Palpations: Abdomen is soft. There is no mass. Tenderness: There is no abdominal tenderness. There is no guarding or rebound. Musculoskeletal:         General: Normal range of motion. Cervical back: Normal range of motion. Skin:     General: Skin is warm and dry. Capillary refill normal.  Neurological:      General: No focal deficit present. Mental Status: She is alert and oriented to person, place, and time. Psychiatric:         Mood and Affect: Mood normal.         Behavior: Behavior normal.         Thought Content: Thought content normal.         Judgment: Judgment normal.     Lab/Data Review:    No results found for this or any previous visit (from the past 12 hour(s)). No results found. Assessment:   Acute respiratory failure extubated on nasal cannula   sacral decubitus ulcer postdebridement  Sepsis with leukocytosis elevated procalcitonin and CRP  Left foot wound  Recent removal of left great toe and second toe  CVA with PEG tube placement  History of aspiration pneumonia  History of chronic kidney disease  CAD with ejection fraction about 20 to 25%  Hypertension  Hyperlipidemia  Anemia of chronic disease  Hyperkalemia  Static post transfusion  Uncontrolled diabetes  Hepatic shock/improving  Coagulopathy  Elevated  Troponin  Bacteremia with coagulase-negative Staphylococcus  Sacral wound secondary to Acinetobacter  and Enterococcus  Procedure:  1.  Left transmetatarsal amputation. 2.  Sacral wound excisional wound debridement 13 x 15 cm to the bone.     Plan:   Continue comfort care with hospice  Discussed with patients daughters at bedside      Current Facility-Administered Medications:     HYDROmorphone (DILAUDID) injection 1 mg, 1 mg, IntraVENous, Q4H, Israel Soto MD, 1 mg at 06/21/22 1248    HYDROmorphone (DILAUDID) injection 1 mg, 1 mg, IntraVENous, Q15MIN PRN, Israel Soto MD, 1 mg at 06/20/22 5247   LORazepam (ATIVAN) injection 1 mg, 1 mg, IntraVENous, Q15MIN PRN, Israel Soto MD    glycopyrrolate (ROBINUL) injection 0.4 mg, 0.4 mg, IntraVENous, Q4H, Israel Soto MD, 0.4 mg at 06/21/22 1248    bisacodyL (DULCOLAX) suppository 10 mg, 10 mg, Rectal, DAILY PRN, Israel Soto MD    LORazepam (ATIVAN) injection 1 mg, 1 mg, IntraVENous, Q4H, Israel Soto MD, 1 mg at 06/21/22 1248    saline peripheral flush soln 5 mL, 5 mL, InterCATHeter, PRN, Israel Soto MD    scopolamine (TRANSDERM-SCOP) 1 mg over 3 days 1 Patch, 1 Patch, TransDERmal, Q72H, Pricila Soto MD, 1 Patch at 06/19/22 1832      Signed By: Benny Noel MD     June 21, 2022

## 2022-06-21 NOTE — PROGRESS NOTES
CM spoke with primary physician. Patient is still under GIP services, but physician does not feel patient will pass in the next few days. SHIRLEY spoke with hospice RN, Sanjay Ayala. She stated that patient will stay here until Friday because family agreement was 10 days under GIP. After Friday if patient needs placement for hospice, the hospice RN will handle placement. CM does not need to work on placement for this patient. CM will continue to follow.

## 2022-06-21 NOTE — PROGRESS NOTES
Problem: Risk for Spread of Infection  Goal: Prevent transmission of infectious organism to others  Description: Prevent the transmission of infectious organisms to other patients, staff members, and visitors. Outcome: Progressing Towards Goal     Problem: Patient Education:  Go to Education Activity  Goal: Patient/Family Education  Outcome: Progressing Towards Goal     Problem: Emotional Support Needs  Goal: Patient/family is receiving emotional support  Description: Pt's daughter Alvin Yates and family will receive emotional support and supportive counseling in processing pt's terminal prognosis within five days. Outcome: Progressing Towards Goal     Problem: Pressure Injury - Risk of  Goal: *Prevention of pressure injury  Description: Document Shakir Scale and appropriate interventions in the flowsheet. Outcome: Progressing Towards Goal  Note: Pressure Injury Interventions:  Sensory Interventions: Float heels,Keep linens dry and wrinkle-free    Moisture Interventions: Absorbent underpads    Activity Interventions: Increase time out of bed    Mobility Interventions: HOB 30 degrees or less    Nutrition Interventions: Document food/fluid/supplement intake    Friction and Shear Interventions: Foam dressings/transparent film/skin sealants                Problem: Patient Education: Go to Patient Education Activity  Goal: Patient/Family Education  Outcome: Progressing Towards Goal     Problem: Hospice Orientation  Goal: Demonstrate understanding of hospice philosophy, plan of care, and home hospice program  Description: The patient/family/caregiver will demonstrate understanding of hospice philosophy, plan of care and the home hospice program as evidenced by participation in meeting the patient's psychosocial, spiritual, medical, and physical needs inclusive of medical supplies/equipment focusing on symptoms.   Outcome: Progressing Towards Goal     Problem: Pain  Goal: *Control of acute pain  Outcome: Progressing Towards Goal     Problem: Breathing Pattern - Ineffective  Goal: *Use of effective breathing techniques  Outcome: Progressing Towards Goal     Problem: General Wound Care  Goal: *Infected Wound: Prevention of further infection  Description: Infection control procedures (eg: clean dressings, clean gloves, hand washing, precautions to isolate wound from contamination, sterile instruments used for wound debridement) should be implemented. Outcome: Progressing Towards Goal  Goal: Interventions  Outcome: Progressing Towards Goal     Problem: Infection - Risk of, Urinary Catheter-Associated Urinary Tract Infection  Goal: *Absence of infection signs and symptoms  Outcome: Progressing Towards Goal     Problem: Discharge Planning  Goal: *Participates in discharge planning  Outcome: Progressing Towards Goal     Problem: Anticipatory Grief  Goal: Grief heard and acknowledged, anxiety reduced, patient coping identified, patient/family expressed gratitude  Outcome: Progressing Towards Goal     Problem: Falls - Risk of  Goal: *Absence of Falls  Description: Document Maryann Fall Risk and appropriate interventions in the flowsheet.   Outcome: Progressing Towards Goal     Problem: Patient Education: Go to Patient Education Activity  Goal: Patient/Family Education  Outcome: Progressing Towards Goal

## 2022-06-21 NOTE — PROGRESS NOTES
Progress Note    Patient: Deanne Delgado MRN: [id number]  SSN: [id number]    YOB: 1945  Age: 78 y.o. Sex: female      Admit Date: 6/16/2022    LOS: 5 days     Subjective:   Patient is a 69 y. o. year old female   78years old female with significant past medical history of CVA with PEG tube chronic kidney disease CVA with right-sided weakness bedbound DVT of the left great toe status post removal of the left great and second toe history of aspiration pneumonia history of sacral decubitus ulcer     Patient was transferred to hospice service/     6/18  Patient asleep in bed, NAD with family at bedside who report no changes  Family members met with       6/19     Resting comfortably family at the bedside     6/20  Resting comfortably in bed, family at bedside. NAD    6/21  Comfortably in bed family at bedside no acute distress  Hospice met with family-decided not to increase hydromorphone  infectious disease discontinued all antibiotics    Objective:     Vitals:    06/20/22 0756 06/20/22 1744 06/21/22 0014 06/21/22 0832   BP: (!) 111/52 (!) 176/78 (!) 176/77 (!) 176/67   Pulse: 76 83 72 73   Resp: 18 18 18 18   Temp: 97.6 °F (36.4 °C)  97.7 °F (36.5 °C) 97.9 °F (36.6 °C)   SpO2:   99%    Weight:       Height:            Intake and Output:  Current Shift: No intake/output data recorded. Last three shifts: No intake/output data recorded. Review of Symptoms:  Unable to obtain. Physical Exam:   Constitutional:  Normal appearance. HENT:      Head: Normocephalic and atraumatic. Nose: Nose normal.      Mouth/Throat:      Mouth: Mucous membranes are moist.      Pharynx: Oropharynx is clear. Eyes: PERRLA. Conjunctivae normal.   Cardiovascular:      Rate and Rhythm: Normal rate and regular rhythm. Pulses: Normal pulses. Heart sounds: Normal heart sounds. Pulmonary:      Effort: Pulmonary effort is normal.      Breath sounds: Normal breath sounds.    Abdominal: General: Bowel sounds are normal. There is no distension. Palpations: Abdomen is soft. There is no mass. Tenderness: There is no abdominal tenderness. There is no guarding or rebound. Musculoskeletal:         General: Normal range of motion. Cervical back: Normal range of motion. Skin:     General: Skin is warm and dry. Capillary refill normal.  Neurological:      General: No focal deficit present. Mental Status: She is alert and oriented to person, place, and time. Psychiatric:         Mood and Affect: Mood normal.         Behavior: Behavior normal.         Thought Content: Thought content normal.         Judgment: Judgment normal.     Lab/Data Review:    No results found for this or any previous visit (from the past 12 hour(s)). No results found. Assessment:   Acute respiratory failure extubated on nasal cannula   sacral decubitus ulcer postdebridement  Sepsis with leukocytosis elevated procalcitonin and CRP  Left foot wound  Recent removal of left great toe and second toe  CVA with PEG tube placement  History of aspiration pneumonia  History of chronic kidney disease  CAD with ejection fraction about 20 to 25%  Hypertension  Hyperlipidemia  Anemia of chronic disease  Hyperkalemia  Static post transfusion  Uncontrolled diabetes  Hepatic shock/improving  Coagulopathy  Elevated  Troponin  Bacteremia with coagulase-negative Staphylococcus  Sacral wound secondary to Acinetobacter  and Enterococcus  Procedure:  1.  Left transmetatarsal amputation. 2.  Sacral wound excisional wound debridement 13 x 15 cm to the bone.     Plan:   Continue comfort care with hospice  Discussed with patients daughters at bedside      Current Facility-Administered Medications:     HYDROmorphone (DILAUDID) injection 1 mg, 1 mg, IntraVENous, Q4H, Israel Soto MD, 1 mg at 06/21/22 1540    HYDROmorphone (DILAUDID) injection 1 mg, 1 mg, IntraVENous, Q15MIN PRN, Israel Soto MD, 1 mg at 06/20/22 3651   LORazepam (ATIVAN) injection 1 mg, 1 mg, IntraVENous, Q15MIN PRN, Israel Soto MD    glycopyrrolate (ROBINUL) injection 0.4 mg, 0.4 mg, IntraVENous, Q4H, Israel Soto MD, 0.4 mg at 06/21/22 6481    bisacodyL (DULCOLAX) suppository 10 mg, 10 mg, Rectal, DAILY PRN, Leticia Soto MD    LORazepam (ATIVAN) injection 1 mg, 1 mg, IntraVENous, Q4H, Israel Soto MD, 1 mg at 06/21/22 0835    saline peripheral flush soln 5 mL, 5 mL, InterCATHeter, PRN, Israel Soto MD    scopolamine (TRANSDERM-SCOP) 1 mg over 3 days 1 Patch, 1 Patch, TransDERmal, Q72H, Leticia Soto MD, 1 Patch at 06/19/22 5572      Signed By: Maxine Leyden     June 21, 2022

## 2022-06-21 NOTE — PROGRESS NOTES
CM reviewed chart. Patient admitted under [facility] hospice. CM will continue to follow.

## 2022-06-21 NOTE — HOSPICE
[facility]  [phone]    GIP Daily Nursing Note   Patient Name: Nandini Morales  YOB: 1945  Age: 78 y.o. Date of Visit: 06/21/22  Facility of Care: [facility]  Patient Room: 570/01     Hospice Attending: Sera Posada MD  Hospice Diagnosis: Sepsis ([address] [A41.9]    Level of Care: GIP    Current GIP Symptoms      1. Non-verbal pain  2. Airway secretions  3. Unresponsive      ASSESSMENT & PLAN     1. Continue GIP for management of pain, secretions  2. Hydromorphone 1 mg IV every 4 hours and prn  3. Lorazepam 1 mg IV every 4 hours and prn  4. Robinul 0.4 mg IV every 4 hours; Scopolamine transdermal 1 mg every 3 days  5. Maintain contact precautions      Spiritual Interventions: Hospital and hospice chaplains providing spiritual counseling    Psych/ Social/ Emotional Interventions: Family well supported by     Care Coordination Needs: Daily contact with Merna and other family holding kaur. POC discussed with MD and SHIRLEY. Care plan and New Orders discussed / approved with Brenda Reid MD.    Description History and Chart Review   Narrative History of last 24 hours that demonstrates care cannot be provided in another setting:  Patient continues to require frequent nursing assessment and management of IV meds     What has been done to control the patient's symptoms in the last 24 hours? Scheduled IV Hydromorphone, Lorazepam and Robinul    Does the patient currently require IV medications? Yes  Does the patient currently require scheduled medications? Yes  Does the patient currently require a PCA?  No    List number of doses of PRN medications in last 24 hours:  Medication 1:  Number of doses:    Medication 2:   Number of doses:    Medication 3:   Number of doses:    Supporting documentation for GIP need for pain control:  [x] Frequent evaluation by a doctor, nurse practitioner, nurse   [] Frequent medication adjustment    [] IVs that cannot be administered at home   [] Aggressive pain management   [] Complicated technical delivery of medications              Supporting documentation for GIP need for symptom control:  [x]  Sudden decline necessitating intensive nursing intervention  []  Uncontrolled / intractable nausea or vomiting   []  Pathological fractures  []  Advanced open wounds requiring frequent skilled care  [] Unmanageable respiratory distress  [] New or worsening delirium   [] Delirium with behavior issues: Is 24 hour caregiver present due to safety concerns with agitation? (yes/no)  [x] Imminent death - with skilled nursing needs documented above    DISCHARGE PLANNING     1. Discharge Plan: LTC as family unable to care for patient in home  2. Patient/Family teaching: Symptom management  3. Response to patient/family teaching: Acknowledge underdstanding    ASSESSMENT    KARNOFSKY: 10    Prognosis estimated based on 06/21/22 clinical assessment is:   [] Few to Many Hours  [x] Hours to Days   [] Few to Many Days   [] Days to Weeks   [] Few to Many Weeks   [] Weeks to Months   [] Few to Many Months    Quality Measure: Patient self-reports:  [] Yes    [x] No    ESAS:   Time of Assessment: 10:30  Pain (1-10): 4  Fatigue (1-10):   Shortness of breath (1-10): 2  Nausea (1-10): Appetite (1-10):    Anxiety: (1-10):   Depression: (1-10):   Well-being: (1-10):   Constipation: _ Yes  _ No      CLINICAL INFORMATION   Patient Vitals for the past 12 hrs:   Temp Pulse Resp BP SpO2   06/21/22 1654 -- -- -- -- 100 %   06/21/22 0832 97.9 °F (36.6 °C) 73 18 (!) 176/67 --       Currently this patient has:  [x] Supplemental O2   [x] IV    [] PICC      [] PORT   [] NG Tube    [x] PEG Tube   [] Ostomy     [x] Bell draining yellow urine  [] Other:     SIGNS/PHYSICAL FINDINGS     Skin (including wound):  [] Warm, dry, supple, intact and color normal for race  [x] Warm   [x] Dry   [] Cool     [] Clammy       [] Diaphoretic    Turgor   [] Normal   [x] Decreased  Color:   [] Pink   [] Pale   [] Cyanotic   [] Erythema   [] Jaundice   [x] Normal for Race  [x]  Wounds: see Avatar    Neuro:  [] Lethargy  [] Restlessness / agitation  [] Confusion / delirium  [] Hallucinations  [] Responds to maximal stimulation  [x] Unresponsive  [] Seizures     Cardiac:  [] Dyspnea on Exertion  [] JVD  [] Murmur  [] Palpitations  [] Hypotension  [x] Hypertension  [] Tachycardia  [] Bradycardia  [] Irregular HR  [] Pulses Decreased  [] Pulses Absent  [] Edema:         [] Mottling:          Respiratory:  Breath sounds:    [x] Diminished   [] Wheeze   [x] Rhonchi   [] Rales   [] Even and unlabored  [] Labored:            [] Cough   [] Non Productive   [] Productive    [] Description:           [] Deep suctioned   [x] O2 at 1 LPM  [] High flow oxygen greater than 10 LPM  [] Bi-Pap    GI  [] Abdomen (describe)   [] Ascites  [] Nausea  [] Vomiting  [x] Incontinent of bowels  [x] Bowel sounds hypoactive  [] Diarrhea  [] Constipation (see above including last bowel movement)  [] Checked for impaction  [] Last BM 6/19    Nutrition  Diet: NPO  Appetite:   [] Good   [] Fair   [] Poor   [] Tube Feeding       [] Voiding  [] Incontinent   [x] Bell    Musculoskeletal  [] Balance/[place] Unsteady   [] Weak   Strength:    [] Normal    [] Limited    [] Decreasing   Activities:    [] Up as tolerated   [x] Bedridden    [] Specify:    SAFETY  [x] 24 hr. Caregiver   [x] Side rails ?     [x] Hospital bed   [x] Reviewed Falls & Safety       ALLERGIES AND MEDICATIONS     Allergies: No Known Allergies    Current Facility-Administered Medications   Medication Dose Route Frequency    HYDROmorphone (DILAUDID) injection 1 mg  1 mg IntraVENous Q4H    HYDROmorphone (DILAUDID) injection 1 mg  1 mg IntraVENous Q15MIN PRN    LORazepam (ATIVAN) injection 1 mg  1 mg IntraVENous Q15MIN PRN    glycopyrrolate (ROBINUL) injection 0.4 mg  0.4 mg IntraVENous Q4H    bisacodyL (DULCOLAX) suppository 10 mg  10 mg Rectal DAILY PRN    LORazepam (ATIVAN) injection 1 mg  1 mg IntraVENous Q4H    saline peripheral flush soln 5 mL  5 mL InterCATHeter PRN    scopolamine (TRANSDERM-SCOP) 1 mg over 3 days 1 Patch  1 Patch TransDERmal Q72H          Visit Time In: 10:30  Visit Time Out: 11:15

## 2022-06-22 NOTE — PROGRESS NOTES
Nutrition Note      Nutrition Plan/Recommendations:  Nutrition sign-off. RD reviewed chart. Pt familiar to RD, no diet order in place- currently on Hospice. RD to sign-off. Consult nutrition services as medically appropriate. Electronically signed by David Hardwick RD on 6/22/2022 at 10:49 AM    Contact: ext. [phone] or Angelia.

## 2022-06-22 NOTE — PROGRESS NOTES
Progress Note    Patient: Marcella Wong MRN: [id number]  SSN: [id number]    YOB: 1945  Age: 78 y.o. Sex: female      Admit Date: 6/16/2022    LOS: 6 days     Subjective:   Patient is a 69 y. o. year old female   78years old female with significant past medical history of CVA with PEG tube chronic kidney disease CVA with right-sided weakness bedbound DVT of the left great toe status post removal of the left great and second toe history of aspiration pneumonia history of sacral decubitus ulcer     Patient was transferred to hospice service/     6/18  Patient asleep in bed, NAD with family at bedside who report no changes  Family members met with       6/19     Resting comfortably family at the bedside     6/20  Resting comfortably in bed, family at bedside. NAD    6/21  Comfortably in bed family at bedside no acute distress  Hospice met with family-decided not to increase hydromorphone  infectious disease discontinued all antibiotics    6/22  Comfortably in bed family at bedside no acute distress  CM contacted due to belief that patient will not pass in next few days and should be moved to outpatient hospice- CM spoke to RN hospice, Sandra Lozano, who said patient will remain at [facility] until Friday (10 days under GIP), and if patient still needs hospice the hospice RN will soheila placement. Objective:     Vitals:    06/21/22 0832 06/21/22 1654 06/21/22 2005 06/22/22 0755   BP: (!) 176/67  (!) 176/67 (!) 140/62   Pulse: 73  96 79   Resp: 18  20 20   Temp: 97.9 °F (36.6 °C)  97.7 °F (36.5 °C) 97.2 °F (36.2 °C)   SpO2:  100% 98% 97%   Weight:       Height:            Intake and Output:  Current Shift: No intake/output data recorded. Last three shifts: 06/20 1901 - 06/22 0700  In: -   Out: 800 [Urine:800]    Review of Symptoms:  Unable to obtain. Physical Exam:   Constitutional:  Normal appearance. HENT:      Head: Normocephalic and atraumatic.       Nose: Nose normal.      Mouth/Throat: Mouth: Mucous membranes are moist.      Pharynx: Oropharynx is clear. Eyes: PERRLA. Conjunctivae normal.   Cardiovascular:      Rate and Rhythm: Normal rate and regular rhythm. Pulses: Normal pulses. Heart sounds: Normal heart sounds. Pulmonary:      Effort: Pulmonary effort is normal.      Breath sounds: Normal breath sounds. Abdominal:      General: Bowel sounds are normal. There is no distension. Palpations: Abdomen is soft. There is no mass. Tenderness: There is no abdominal tenderness. There is no guarding or rebound. Musculoskeletal:         General: Normal range of motion. Cervical back: Normal range of motion. Skin:     General: Skin is warm and dry. Capillary refill normal.  Neurological:      General: No focal deficit present. Mental Status: She is alert and oriented to person, place, and time. Psychiatric:         Mood and Affect: Mood normal.         Behavior: Behavior normal.         Thought Content: Thought content normal.         Judgment: Judgment normal.     Lab/Data Review:    No results found for this or any previous visit (from the past 12 hour(s)). No results found. Assessment:   Acute respiratory failure extubated on nasal cannula   sacral decubitus ulcer postdebridement  Sepsis with leukocytosis elevated procalcitonin and CRP  Left foot wound  Recent removal of left great toe and second toe  CVA with PEG tube placement  History of aspiration pneumonia  History of chronic kidney disease  CAD with ejection fraction about 20 to 25%  Hypertension  Hyperlipidemia  Anemia of chronic disease  Hyperkalemia  Static post transfusion  Uncontrolled diabetes  Hepatic shock/improving  Coagulopathy  Elevated  Troponin  Bacteremia with coagulase-negative Staphylococcus  Sacral wound secondary to Acinetobacter  and Enterococcus  Procedure:  1.  Left transmetatarsal amputation. 2.  Sacral wound excisional wound debridement 13 x 15 cm to the bone.     Plan: Continue comfort care with hospice  Discussed with patients daughters at bedside      Current Facility-Administered Medications:     HYDROmorphone (DILAUDID) injection 1 mg, 1 mg, IntraVENous, Q4H, Israel Soto MD, 1 mg at 06/22/22 7199    HYDROmorphone (DILAUDID) injection 1 mg, 1 mg, IntraVENous, Q15MIN PRN, Israel Soto MD, 1 mg at 06/20/22 1739    LORazepam (ATIVAN) injection 1 mg, 1 mg, IntraVENous, Q15MIN PRN, Israel Soto MD    glycopyrrolate (ROBINUL) injection 0.4 mg, 0.4 mg, IntraVENous, Q4H, Israel Soto MD, 0.4 mg at 06/22/22 0820    bisacodyL (DULCOLAX) suppository 10 mg, 10 mg, Rectal, DAILY PRN, Israel Soto MD    LORazepam (ATIVAN) injection 1 mg, 1 mg, IntraVENous, Q4H, Israel Soto MD, 1 mg at 06/22/22 0820    saline peripheral flush soln 5 mL, 5 mL, InterCATHeter, PRN, Isarel Soto MD    scopolamine (TRANSDERM-SCOP) 1 mg over 3 days 1 Patch, 1 Patch, TransDERmal, Q72H, Pricila Soto MD, 1 Patch at 06/19/22 1832      Signed By: Benny Noel MD     June 22, 2022

## 2022-06-22 NOTE — PROGRESS NOTES
Progress Note    Patient: Lavell Wu MRN: [id number]  SSN: [id number]    YOB: 1945  Age: 78 y.o. Sex: female      Admit Date: 6/16/2022    LOS: 6 days     Subjective:   Patient is a 69 y. o. year old female   78years old female with significant past medical history of CVA with PEG tube chronic kidney disease CVA with right-sided weakness bedbound DVT of the left great toe status post removal of the left great and second toe history of aspiration pneumonia history of sacral decubitus ulcer     Patient was transferred to hospice service/     6/18  Patient asleep in bed, NAD with family at bedside who report no changes  Family members met with       6/19     Resting comfortably family at the bedside     6/20  Resting comfortably in bed, family at bedside. NAD    6/21  Comfortably in bed family at bedside no acute distress  Hospice met with family-decided not to increase hydromorphone  infectious disease discontinued all antibiotics    6/22  Comfortably in bed family at bedside no acute distress  CM contacted due to belief that patient will not pass in next few days and should be moved to outpatient hospice- CM spoke to RN hospice, Karissa Davila, who said patient will remain at [facility] until Friday (10 days under GIP), and if patient still needs hospice the hospice RN will soheila placement. Objective:     Vitals:    06/21/22 0832 06/21/22 1654 06/21/22 2005 06/22/22 0755   BP: (!) 176/67  (!) 176/67 (!) 140/62   Pulse: 73  96 79   Resp: 18  20 20   Temp: 97.9 °F (36.6 °C)  97.7 °F (36.5 °C) 97.2 °F (36.2 °C)   SpO2:  100% 98% 97%   Weight:       Height:            Intake and Output:  Current Shift: No intake/output data recorded. Last three shifts: 06/20 1901 - 06/22 0700  In: -   Out: 800 [Urine:800]    Review of Symptoms:  Unable to obtain. Physical Exam:   Constitutional:  Normal appearance. HENT:      Head: Normocephalic and atraumatic.       Nose: Nose normal.      Mouth/Throat: Mouth: Mucous membranes are moist.      Pharynx: Oropharynx is clear. Eyes: PERRLA. Conjunctivae normal.   Cardiovascular:      Rate and Rhythm: Normal rate and regular rhythm. Pulses: Normal pulses. Heart sounds: Normal heart sounds. Pulmonary:      Effort: Pulmonary effort is normal.      Breath sounds: Normal breath sounds. Abdominal:      General: Bowel sounds are normal. There is no distension. Palpations: Abdomen is soft. There is no mass. Tenderness: There is no abdominal tenderness. There is no guarding or rebound. Musculoskeletal:         General: Normal range of motion. Cervical back: Normal range of motion. Skin:     General: Skin is warm and dry. Capillary refill normal.  Neurological:      General: No focal deficit present. Mental Status: She is alert and oriented to person, place, and time. Psychiatric:         Mood and Affect: Mood normal.         Behavior: Behavior normal.         Thought Content: Thought content normal.         Judgment: Judgment normal.     Lab/Data Review:    No results found for this or any previous visit (from the past 12 hour(s)). No results found. Assessment:   Acute respiratory failure extubated on nasal cannula   sacral decubitus ulcer postdebridement  Sepsis with leukocytosis elevated procalcitonin and CRP  Left foot wound  Recent removal of left great toe and second toe  CVA with PEG tube placement  History of aspiration pneumonia  History of chronic kidney disease  CAD with ejection fraction about 20 to 25%  Hypertension  Hyperlipidemia  Anemia of chronic disease  Hyperkalemia  Static post transfusion  Uncontrolled diabetes  Hepatic shock/improving  Coagulopathy  Elevated  Troponin  Bacteremia with coagulase-negative Staphylococcus  Sacral wound secondary to Acinetobacter  and Enterococcus  Procedure:  1.  Left transmetatarsal amputation. 2.  Sacral wound excisional wound debridement 13 x 15 cm to the bone.     Plan: Continue comfort care with hospice  Discussed with patients daughters at bedside      Current Facility-Administered Medications:     HYDROmorphone (DILAUDID) injection 1 mg, 1 mg, IntraVENous, Q4H, Israel Soto MD, 1 mg at 06/22/22 5797    HYDROmorphone (DILAUDID) injection 1 mg, 1 mg, IntraVENous, Q15MIN PRN, Israel Soto MD, 1 mg at 06/20/22 1739    LORazepam (ATIVAN) injection 1 mg, 1 mg, IntraVENous, Q15MIN PRN, Israel Soto MD    glycopyrrolate (ROBINUL) injection 0.4 mg, 0.4 mg, IntraVENous, Q4H, Israel Soto MD, 0.4 mg at 06/22/22 0820    bisacodyL (DULCOLAX) suppository 10 mg, 10 mg, Rectal, DAILY PRN, Israel Soto MD    LORazepam (ATIVAN) injection 1 mg, 1 mg, IntraVENous, Q4H, Israel Soto MD, 1 mg at 06/22/22 0820    saline peripheral flush soln 5 mL, 5 mL, InterCATHeter, PRN, Israel Soto MD    scopolamine (TRANSDERM-SCOP) 1 mg over 3 days 1 Patch, 1 Patch, TransDERmal, Q72H, Lynne Soto MD, 1 Patch at 06/19/22 1832      Signed By: Marianna Menchaca     June 22, 2022

## 2022-06-22 NOTE — HOSPICE
[facility]  [phone]    GIP Daily Nursing Note   Patient Name: James Watts  YOB: 1945  Age: 78 y.o. Date of Visit: 06/22/22  Facility of Care: [facility]  Patient Room: 570/01     Hospice Attending: Sesar Oviedo MD  Hospice Diagnosis: Sepsis ([facility] [address] [A41.9]    Level of Care: GIP    Current GIP Symptoms      1. Non-verbal pain  2. Airway secretions  3. Unresponsive      ASSESSMENT & PLAN     1. Continue GIP for management of pain, secretions  2. Hydromorphone 1 mg IV every 4 hours and prn  3. Lorazepam 1 mg IV every 4 hours and prn  4. Robinul 0.4 mg IV every 4 hours; Scopolamine transdermal 1 mg every 3 days  5. Maintain contact precautions  Patient has very coarse breath sounds, mouth breathing, no response to voice or touch. Trace edema in arms and hands. Active bowel sounds. Family at bedside. Spiritual Interventions: Hospital and hospice chaplains providing spiritual counseling    Psych/ Social/ Emotional Interventions: Family well supported by     Care Coordination Needs: Daily contact with Merna and other family holding kaur. POC discussed with MD and SHIRLEY. Care plan and New Orders discussed / approved with Alesia Segura MD.    Description History and Chart Review   Narrative History of last 24 hours that demonstrates care cannot be provided in another setting:  Patient continues to require frequent nursing assessment and management of IV meds     What has been done to control the patient's symptoms in the last 24 hours? Scheduled IV Hydromorphone, Lorazepam and Robinul    Does the patient currently require IV medications? Yes  Does the patient currently require scheduled medications? Yes  Does the patient currently require a PCA?  No    List number of doses of PRN medications in last 24 hours:  Medication 1:  Number of doses:    Medication 2:   Number of doses:    Medication 3:   Number of doses:    Supporting documentation for GIP need for pain control:  [x] Frequent evaluation by a doctor, nurse practitioner, nurse   [] Frequent medication adjustment    [] IVs that cannot be administered at home   [] Aggressive pain management   [] Complicated technical delivery of medications              Supporting documentation for GIP need for symptom control:  [x]  Sudden decline necessitating intensive nursing intervention  []  Uncontrolled / intractable nausea or vomiting   []  Pathological fractures  [x]  Advanced open wounds requiring frequent skilled care  [] Unmanageable respiratory distress  [] New or worsening delirium   [] Delirium with behavior issues: Is 24 hour caregiver present due to safety concerns with agitation? (yes/no)  [x] Imminent death - with skilled nursing needs documented above    DISCHARGE PLANNING     1. Discharge Plan: LTC as family unable to care for patient in home  2. Patient/Family teaching: Symptom management  3. Response to patient/family teaching: Acknowledge underdstanding    ASSESSMENT    KARNOFSKY: 10    Prognosis estimated based on 06/22/22 clinical assessment is:   [] Few to Many Hours  [x] Hours to Days   [] Few to Many Days   [] Days to Weeks   [] Few to Many Weeks   [] Weeks to Months   [] Few to Many Months    Quality Measure: Patient self-reports:  [] Yes    [x] No    ESAS:   Time of Assessment: 10:00  Pain (1-10): 2  Fatigue (1-10):   Shortness of breath (1-10): 2  Nausea (1-10): Appetite (1-10):    Anxiety: (1-10):   Depression: (1-10):   Well-being: (1-10):   Constipation: _ Yes  _ No      CLINICAL INFORMATION     Patient Vitals for the past 12 hrs:   Temp Pulse Resp BP SpO2   06/22/22 0755 97.2 °F (36.2 °C) 79 20 (!) 140/62 97 %       Currently this patient has:  [x] Supplemental O2   [x] IV    [] PICC      [] PORT   [] NG Tube    [x] PEG Tube   [] Ostomy     [x] Bell draining yellow, cloudy urine  [] Other:     SIGNS/PHYSICAL FINDINGS     Skin (including wound):  [] Warm, dry, supple, intact and color normal for race  [x] Warm   [x] Dry   [x] Cool     [] Clammy       [] Diaphoretic    Turgor   [] Normal   [x] Decreased  Color:   [] Pink   [] Pale   [] Cyanotic   [] Erythema   [] Jaundice   [x] Normal for Race  [x]  Wounds: see Avatar    Neuro:  [] Lethargy  [] Restlessness / agitation  [] Confusion / delirium  [] Hallucinations  [] Responds to maximal stimulation  [x] Unresponsive  [] Seizures     Cardiac:  [] Dyspnea on Exertion  [] JVD  [] Murmur  [] Palpitations  [] Hypotension  [x] Hypertension  [] Tachycardia  [] Bradycardia  [] Irregular HR  [] Pulses Decreased  [] Pulses Absent  [x] Edema:     Arms trace    [] Mottling:          Respiratory:  Breath sounds:    [x] Diminished   [] Wheeze   [x] Rhonchi   [] Rales   [] Even and unlabored  [] Labored:            [] Cough   [] Non Productive   [] Productive    [] Description:           [] Deep suctioned   [x] O2 at 1 LPM  [] High flow oxygen greater than 10 LPM  [] Bi-Pap    GI  [] Abdomen (describe)   [] Ascites  [] Nausea  [] Vomiting  [x] Incontinent of bowels  [x] Bowel sounds hypoactive  [] Diarrhea  [] Constipation (see above including last bowel movement)  [] Checked for impaction  [] Last BM 6/19    Nutrition  Diet: NPO  Appetite:   [] Good   [] Fair   [] Poor   [] Tube Feeding       [] Voiding  [] Incontinent   [x] Bell    Musculoskeletal  [] Balance/[place] Unsteady   [] Weak   Strength:    [] Normal    [] Limited    [] Decreasing   Activities:    [] Up as tolerated   [x] Bedridden    [] Specify:    SAFETY  [x] 24 hr. Caregiver   [x] Side rails ?     [x] Hospital bed   [x] Reviewed Falls & Safety       ALLERGIES AND MEDICATIONS     Allergies: No Known Allergies    Current Facility-Administered Medications   Medication Dose Route Frequency    HYDROmorphone (DILAUDID) injection 1 mg  1 mg IntraVENous Q4H    HYDROmorphone (DILAUDID) injection 1 mg  1 mg IntraVENous Q15MIN PRN    LORazepam (ATIVAN) injection 1 mg  1 mg IntraVENous Q15MIN PRN    glycopyrrolate (ROBINUL) injection 0.4 mg  0.4 mg IntraVENous Q4H    bisacodyL (DULCOLAX) suppository 10 mg  10 mg Rectal DAILY PRN    LORazepam (ATIVAN) injection 1 mg  1 mg IntraVENous Q4H    saline peripheral flush soln 5 mL  5 mL InterCATHeter PRN    scopolamine (TRANSDERM-SCOP) 1 mg over 3 days 1 Patch  1 Patch TransDERmal Q72H          Visit Time In: 10:00  Visit Time Out: 0984

## 2022-06-22 NOTE — PROGRESS NOTES
Problem: Emotional Support Needs  Goal: Patient/family is receiving emotional support  Description: Pt's daughter Beryl Flanagan and family will receive emotional support and supportive counseling in processing pt's terminal prognosis within five days. Outcome: Progressing Towards Goal     Problem: Anticipatory Grief  Goal: Grief heard and acknowledged, anxiety reduced, patient coping identified, patient/family expressed gratitude  Outcome: Progressing Towards Goal     Problem: Falls - Risk of  Goal: *Absence of Falls  Description: Document [place] Fall Risk and appropriate interventions in the flowsheet.   Outcome: Progressing Towards Goal  Note: Fall Risk Interventions:  Mobility Interventions: Bed/chair exit alarm    Mentation Interventions: Bed/chair exit alarm,More frequent rounding    Medication Interventions: Evaluate medications/consider consulting pharmacy    Elimination Interventions: Call light in reach

## 2022-06-23 NOTE — PROGRESS NOTES
Kati Gomez RN spoke with Dr. Catia Tyler and informed him that the patients family wants to speak with him regarding taking patient off of hospice. Dr. Catia Tyler told Kaykay Salazar that he was on his way to the hospital and he will see the patient and family when he gets here.  Family informed

## 2022-06-23 NOTE — PROGRESS NOTES
CM reviewed chart. Patient continues under [facility] hospice. Tomorrow is day 10/10 of [facility] hospice. Patient's family will be taking her home with [facility] at discharge. CM team will continue to follow.

## 2022-06-23 NOTE — HOSPICE
Routine spiritual care visit with the patient and daughter and friend. The friend was sleeping. The daughter said the patient had been napping and they were resting. She said they were fine and not in need of any spiritual support at this time. They will reach out if their needs change.

## 2022-06-23 NOTE — PROGRESS NOTES
Progress Note    Patient: Lenin Patel MRN: [id number]  SSN: [id number]    YOB: 1945  Age: 78 y.o. Sex: female      Admit Date: 6/16/2022    LOS: 7 days     Subjective:   Patient is a 69 y. o. year old female   78years old female with significant past medical history of CVA with PEG tube chronic kidney disease CVA with right-sided weakness bedbound DVT of the left great toe status post removal of the left great and second toe history of aspiration pneumonia history of sacral decubitus ulcer     Patient was transferred to hospice service/     6/18  Patient asleep in bed, NAD with family at bedside who report no changes  Family members met with       6/19     Resting comfortably family at the bedside     6/20  Resting comfortably in bed, family at bedside. NAD    6/21  Comfortably in bed family at bedside no acute distress  Hospice met with family-decided not to increase hydromorphone  infectious disease discontinued all antibiotics    6/22  Comfortably in bed family at bedside no acute distress  CM contacted due to belief that patient will not pass in next few days and should be moved to outpatient hospice- CM spoke to RN hospice, Blanca Dumont, who said patient will remain at [address] [place] [address] until Friday (10 days under GIP), and if patient still needs hospice the hospice RN will soheila placement. 6/23  Patient sleeping comfortably in bed with family at bedside- no acute distress. Family asking questions about her status- answered to satsifaction. Hemodynamically stable. Objective:     Vitals:    06/21/22 2005 06/22/22 0755 06/22/22 2315 06/23/22 0911   BP: (!) 176/67 (!) 140/62 (!) 165/66 (!) 163/79   Pulse: 96 79 80 68   Resp: 20 20 19 18   Temp: 97.7 °F (36.5 °C) 97.2 °F (36.2 °C) 97.6 °F (36.4 °C) 97.6 °F (36.4 °C)   SpO2: 98% 97% 100%    Weight:       Height:            Intake and Output:  Current Shift: No intake/output data recorded.   Last three shifts: 06/21 1901 - 06/23 0700  In: - Out: 800 [Urine:800]    Review of Symptoms:  Unable to obtain. Physical Exam:   Constitutional:  Normal appearance. HENT:      Head: Normocephalic and atraumatic. Nose: Nose normal.      Mouth/Throat:      Mouth: Mucous membranes are moist.      Pharynx: Oropharynx is clear. Eyes: PERRLA. Conjunctivae normal.   Cardiovascular:      Rate and Rhythm: Normal rate and regular rhythm. Pulses: Normal pulses. Heart sounds: Normal heart sounds. Pulmonary:      Effort: Pulmonary effort is normal.      Breath sounds: Normal breath sounds. Abdominal:      General: Bowel sounds are normal. There is no distension. Palpations: Abdomen is soft. There is no mass. Tenderness: There is no abdominal tenderness. There is no guarding or rebound. Musculoskeletal:         General: Normal range of motion. Cervical back: Normal range of motion. Skin:     General: Skin is warm and dry. Capillary refill normal.  Neurological:      General: No focal deficit present. Mental Status: She is alert and oriented to person, place, and time. Psychiatric:         Mood and Affect: Mood normal.         Behavior: Behavior normal.         Thought Content: Thought content normal.         Judgment: Judgment normal.     Lab/Data Review:    No results found for this or any previous visit (from the past 12 hour(s)). No results found.     Assessment:   Acute respiratory failure extubated on nasal cannula   sacral decubitus ulcer postdebridement  Sepsis with leukocytosis elevated procalcitonin and CRP  Left foot wound  Recent removal of left great toe and second toe  CVA with PEG tube placement  History of aspiration pneumonia  History of chronic kidney disease  CAD with ejection fraction about 20 to 25%  Hypertension  Hyperlipidemia  Anemia of chronic disease  Hyperkalemia  Static post transfusion  Uncontrolled diabetes  Hepatic shock/improving  Coagulopathy  Elevated  Troponin  Bacteremia with coagulase-negative Staphylococcus  Sacral wound secondary to Acinetobacter  and Enterococcus  Procedure:  1.  Left transmetatarsal amputation. 2.  Sacral wound excisional wound debridement 13 x 15 cm to the bone.     Plan:   Continue comfort care with hospice  Discussed with patients daughters at bedside      Had family meeting regarding revoking hospice/hospice nurse also present  Discussed with patient kids    They will discuss again themselves before revoking the hospice      Current Facility-Administered Medications:     HYDROmorphone (DILAUDID) injection 1 mg, 1 mg, IntraVENous, Q4H, Israle Soto MD, 1 mg at 06/23/22 0908    HYDROmorphone (DILAUDID) injection 1 mg, 1 mg, IntraVENous, Q15MIN PRN, Trung Baca MD, 1 mg at 06/20/22 1739    LORazepam (ATIVAN) injection 1 mg, 1 mg, IntraVENous, Q15MIN PRN, Israel Soto MD    glycopyrrolate (ROBINUL) injection 0.4 mg, 0.4 mg, IntraVENous, Q4H, Israel Soto MD, 0.4 mg at 06/23/22 0910    bisacodyL (DULCOLAX) suppository 10 mg, 10 mg, Rectal, DAILY PRN, Israel Soto MD    LORazepam (ATIVAN) injection 1 mg, 1 mg, IntraVENous, Q4H, Israel Soto MD, 1 mg at 06/23/22 0907    saline peripheral flush soln 5 mL, 5 mL, InterCATHeter, PRN, Israel Soto MD    scopolamine (TRANSDERM-SCOP) 1 mg over 3 days 1 Patch, 1 Patch, TransDERmal, Q72H, Jennifer Soto MD, 1 Patch at 06/22/22 1705      Signed By: Althea Natarajan MD     June 23, 2022

## 2022-06-23 NOTE — HOSPICE
[facility]  [phone]    [facility] Daily Nursing Note   Patient Name: Kati Burr  YOB: 1945  Age: 78 y.o. Date of Visit: 06/23/22  Facility of Care: [facility]  Patient Room: 570/01     Hospice Attending: Diana Barahona MD  Hospice Diagnosis: Sepsis ([address] [A41.9]    Level of Care: GIP    Current GIP Symptoms      1. Non verbal pain (clinching teeth, grimacing, HTN)  2. Unresponsive  3. Airway secretions    ASSESSMENT & PLAN     1. Increase Dilaudid 2 mg IV every 4 hours with prn available  2. Lorazepam 1 mg IV every 4 hours and prn  3. Robinul 0.4 mg IV every 4 hours; Scopolamine transdermal 1 mg every 3 days  4. Contact precautions    Spiritual Interventions: Hospital and hospice chaplains offering spiritual support    Psych/ Social/ Emotional Interventions:  continues to provide supportive counseling to family    Care Coordination Needs: Family requesting withdrawal from hospice until meeting held with MD and hospice team. Family to make decision to take patient home on hospice or to restart aggressive treatment. Placed on wait list for [facility]. Discussed with Lexi Sanchez and Olga Iniguez. Family agreed to increase in Dilaudid to manage pain. Care plan and New Orders discussed / approved with Awilda Hendrix MD.    Description History and Chart Review   Narrative History of last 24 hours that demonstrates care cannot be provided in another setting:  Requires frequent nursing assessment and management of IV medication for symptom management. What has been done to control the patient's symptoms in the last 24 hours? Scheduled IV Dilaudid, Lorazepam & Robinul    Does the patient currently require IV medications? Yes  Does the patient currently require scheduled medications? Yes  Does the patient currently require a PCA?  No    List number of doses of PRN medications in last 24 hours:  Medication 1:  Number of doses:    Medication 2:   Number of doses: Medication 3:   Number of doses:    Supporting documentation for GIP need for pain control:  [x] Frequent evaluation by a doctor, nurse practitioner, nurse   [x] Frequent medication adjustment    [x] IVs that cannot be administered at home   [] Aggressive pain management   [] Complicated technical delivery of medications              Supporting documentation for GIP need for symptom control:  []  Sudden decline necessitating intensive nursing intervention  []  Uncontrolled / intractable nausea or vomiting   []  Pathological fractures  []  Advanced open wounds requiring frequent skilled care  [] Unmanageable respiratory distress  [] New or worsening delirium   [] Delirium with behavior issues: Is 24 hour caregiver present due to safety concerns with agitation? (yes/no)  [x] Imminent death - with skilled nursing needs documented above    DISCHARGE PLANNING     1. Discharge Plan: Discharge to [facility] or home if able to tolerate SL medication  2. Patient/Family teaching: Hospice philosophy, symptom management  3. Response to patient/family teaching: Family considering withdrawal from hospice    ASSESSMENT    KARNOFSKY:  10    Prognosis estimated based on 06/23/22 clinical assessment is:   [] Few to Many Hours  [x] Hours to Days   [] Few to Many Days   [] Days to Weeks   [] Few to Many Weeks   [] Weeks to Months   [] Few to Many Months    Quality Measure: Patient self-reports:  [] Yes    [x] No    ESAS:   Time of Assessment: 11:00  Pain (1-10): 6  Fatigue (1-10):   Shortness of breath (1-10): 2  Nausea (1-10): Appetite (1-10):    Anxiety: (1-10):   Depression: (1-10):   Well-being: (1-10):   Constipation: _ Yes  _ No    CLINICAL INFORMATION   Patient Vitals for the past 12 hrs:   Temp Pulse Resp BP   06/23/22 1651 97.4 °F (36.3 °C) 73 18 (!) 179/64   06/23/22 0911 97.6 °F (36.4 °C) 68 18 (!) 163/79       Currently this patient has:  [x] Supplemental O2   [x] IV    [] PICC      [] PORT   [] NG Tube    [x] PEG Tube   [] Ostomy     [x] Bell draining yellow urine  [] Other:     SIGNS/PHYSICAL FINDINGS     Skin (including wound):  [] Warm, dry, supple, intact and color normal for race  [x] Warm   [x] Dry   [] Cool     [] Clammy       [] Diaphoretic    Turgor   [] Normal   [x] Decreased  Color:   [] Pink   [] Pale   [] Cyanotic   [] Erythema   [] Jaundice   [x] Normal for Race  [x]  Wounds    Neuro:  [] Lethargy  [] Restlessness / agitation  [] Confusion / delirium  [] Hallucinations  [] Responds to maximal stimulation  [x] Unresponsive  [] Seizures     Cardiac:  [] Dyspnea on Exertion  [] JVD  [] Murmur  [] Palpitations  [] Hypotension  [x] Hypertension  [] Tachycardia  [] Bradycardia  [] Irregular HR  [] Pulses Decreased  [] Pulses Absent  [x] Edema: Bilateral UE's  [] Mottling:          Respiratory:  Breath sounds:    [] Diminished   [] Wheeze   [x] Rhonchi   [] Rales   [] Even and unlabored  [] Labored:            [x] Cough   [] Non Productive   [] Productive    [] Description:           [] Deep suctioned   [x] O2 at 1 LPM  [] High flow oxygen greater than 10 LPM  [] Bi-Pap    GI  [] Abdomen (describe)   [] Ascites  [] Nausea  [] Vomiting  [x] Incontinent of bowels  [x] Bowel sounds hypoactive  [] Diarrhea  [] Constipation (see above including last bowel movement)  [] Checked for impaction  [x] Last BM 6/23    Nutrition  Diet: NPO  Appetite:   [] Good   [] Fair   [] Poor   [] Tube Feeding       [] Voiding  [] Incontinent   [x] Bell    Musculoskeletal  [] Balance/Haysi Unsteady   [] Weak   Strength:    [] Normal    [] Limited    [] Decreasing   Activities:    [] Up as tolerated   [x] Bedridden    [] Specify:    SAFETY  [x] 24 hr. Caregiver   [x] Side rails ?     [x] Hospital bed   [x] Reviewed Falls & Safety       ALLERGIES AND MEDICATIONS     Allergies: No Known Allergies    Current Facility-Administered Medications   Medication Dose Route Frequency    HYDROmorphone (DILAUDID) injection 2 mg  2 mg IntraVENous Q4H    HYDROmorphone (DILAUDID) injection 1 mg  1 mg IntraVENous Q15MIN PRN    LORazepam (ATIVAN) injection 1 mg  1 mg IntraVENous Q15MIN PRN    glycopyrrolate (ROBINUL) injection 0.4 mg  0.4 mg IntraVENous Q4H    bisacodyL (DULCOLAX) suppository 10 mg  10 mg Rectal DAILY PRN    LORazepam (ATIVAN) injection 1 mg  1 mg IntraVENous Q4H    saline peripheral flush soln 5 mL  5 mL InterCATHeter PRN    scopolamine (TRANSDERM-SCOP) 1 mg over 3 days 1 Patch  1 Patch TransDERmal Q72H          Visit Time In: 11:00  Visit Time Out: 11:30

## 2022-06-23 NOTE — PROGRESS NOTES
Patient resting comfortably in bed. Patient repositioned and wound dressing changed. Patient had a small BM. Patient was given evening meds and family at bedside. CBWR.

## 2022-06-23 NOTE — PROGRESS NOTES
Called Dr. Bindu Shoemaker to inform him that the family wants to speak with him and he did not answer the phone.  Message left for him to call Eddie Odonnell at [phone]

## 2022-06-23 NOTE — PROGRESS NOTES
Problem: Risk for Spread of Infection  Goal: Prevent transmission of infectious organism to others  Description: Prevent the transmission of infectious organisms to other patients, staff members, and visitors. Outcome: Progressing Towards Goal     Problem: Patient Education:  Go to Education Activity  Goal: Patient/Family Education  Outcome: Progressing Towards Goal     Problem: Emotional Support Needs  Goal: Patient/family is receiving emotional support  Description: Pt's daughter Lucas Hoffman and family will receive emotional support and supportive counseling in processing pt's terminal prognosis within five days. Outcome: Progressing Towards Goal     Problem: Pressure Injury - Risk of  Goal: *Prevention of pressure injury  Description: Document Shakir Scale and appropriate interventions in the flowsheet. Outcome: Progressing Towards Goal  Note: Pressure Injury Interventions:  Sensory Interventions: Assess changes in LOC,Minimize linen layers    Moisture Interventions: Internal/External urinary devices    Activity Interventions: Pressure redistribution bed/mattress(bed type)    Mobility Interventions: HOB 30 degrees or less    Nutrition Interventions: Document food/fluid/supplement intake    Friction and Shear Interventions: Apply protective barrier, creams and emollients                Problem: Patient Education: Go to Patient Education Activity  Goal: Patient/Family Education  Outcome: Progressing Towards Goal     Problem: Hospice Orientation  Goal: Demonstrate understanding of hospice philosophy, plan of care, and home hospice program  Description: The patient/family/caregiver will demonstrate understanding of hospice philosophy, plan of care and the home hospice program as evidenced by participation in meeting the patient's psychosocial, spiritual, medical, and physical needs inclusive of medical supplies/equipment focusing on symptoms.   Outcome: Progressing Towards Goal     Problem: Pain  Goal: *Control of acute pain  Outcome: Progressing Towards Goal     Problem: Breathing Pattern - Ineffective  Goal: *Use of effective breathing techniques  Outcome: Progressing Towards Goal

## 2022-06-23 NOTE — PROGRESS NOTES
Spoke with MD regarding pt family request for meeting regarding hospice status. MD aware and states he will speak with family. Family at bedside notified. Lou Quinteros with [address] also notified and states she will speak with family as well regarding any questions or concerns regarding pt plan of care.

## 2022-06-23 NOTE — PROGRESS NOTES
Problem: Emotional Support Needs  Goal: Patient/family is receiving emotional support  Description: Pt's daughter Yung Killian and family will receive emotional support and supportive counseling in processing pt's terminal prognosis within five days.   Outcome: Progressing Towards Goal

## 2022-06-23 NOTE — PROGRESS NOTES
Progress Note    Patient: Chele Ng MRN: [id number]  SSN: [id number]    YOB: 1945  Age: 78 y.o. Sex: female      Admit Date: 6/16/2022    LOS: 7 days     Subjective:   Patient is a 69 y. o. year old female   78years old female with significant past medical history of CVA with PEG tube chronic kidney disease CVA with right-sided weakness bedbound DVT of the left great toe status post removal of the left great and second toe history of aspiration pneumonia history of sacral decubitus ulcer     Patient was transferred to hospice service/     6/18  Patient asleep in bed, NAD with family at bedside who report no changes  Family members met with       6/19     Resting comfortably family at the bedside     6/20  Resting comfortably in bed, family at bedside. NAD    6/21  Comfortably in bed family at bedside no acute distress  Hospice met with family-decided not to increase hydromorphone  infectious disease discontinued all antibiotics    6/22  Comfortably in bed family at bedside no acute distress  CM contacted due to belief that patient will not pass in next few days and should be moved to outpatient hospice- CM spoke to RN hospice, Tai Griffith, who said patient will remain at [facility] until Friday (10 days under GIP), and if patient still needs hospice the hospice RN will soheila placement. 6/23  Patient sleeping comfortably in bed with family at bedside- no acute distress. Family asking questions about her status- answered to satsifaction. Hemodynamically stable. Objective:     Vitals:    06/21/22 2005 06/22/22 0755 06/22/22 2315 06/23/22 0911   BP: (!) 176/67 (!) 140/62 (!) 165/66 (!) 163/79   Pulse: 96 79 80 68   Resp: 20 20 19 18   Temp: 97.7 °F (36.5 °C) 97.2 °F (36.2 °C) 97.6 °F (36.4 °C) 97.6 °F (36.4 °C)   SpO2: 98% 97% 100%    Weight:       Height:            Intake and Output:  Current Shift: No intake/output data recorded.   Last three shifts: 06/21 1901 - 06/23 0700  In: - Out: 800 [Urine:800]    Review of Symptoms:  Unable to obtain. Physical Exam:   Constitutional:  Normal appearance. HENT:      Head: Normocephalic and atraumatic. Nose: Nose normal.      Mouth/Throat:      Mouth: Mucous membranes are moist.      Pharynx: Oropharynx is clear. Eyes: PERRLA. Conjunctivae normal.   Cardiovascular:      Rate and Rhythm: Normal rate and regular rhythm. Pulses: Normal pulses. Heart sounds: Normal heart sounds. Pulmonary:      Effort: Pulmonary effort is normal.      Breath sounds: Normal breath sounds. Abdominal:      General: Bowel sounds are normal. There is no distension. Palpations: Abdomen is soft. There is no mass. Tenderness: There is no abdominal tenderness. There is no guarding or rebound. Musculoskeletal:         General: Normal range of motion. Cervical back: Normal range of motion. Skin:     General: Skin is warm and dry. Capillary refill normal.  Neurological:      General: No focal deficit present. Mental Status: She is alert and oriented to person, place, and time. Psychiatric:         Mood and Affect: Mood normal.         Behavior: Behavior normal.         Thought Content: Thought content normal.         Judgment: Judgment normal.     Lab/Data Review:    No results found for this or any previous visit (from the past 12 hour(s)). No results found.     Assessment:   Acute respiratory failure extubated on nasal cannula   sacral decubitus ulcer postdebridement  Sepsis with leukocytosis elevated procalcitonin and CRP  Left foot wound  Recent removal of left great toe and second toe  CVA with PEG tube placement  History of aspiration pneumonia  History of chronic kidney disease  CAD with ejection fraction about 20 to 25%  Hypertension  Hyperlipidemia  Anemia of chronic disease  Hyperkalemia  Static post transfusion  Uncontrolled diabetes  Hepatic shock/improving  Coagulopathy  Elevated  Troponin  Bacteremia with coagulase-negative Staphylococcus  Sacral wound secondary to Acinetobacter  and Enterococcus  Procedure:  1.  Left transmetatarsal amputation. 2.  Sacral wound excisional wound debridement 13 x 15 cm to the bone.     Plan:   Continue comfort care with hospice  Discussed with patients daughters at bedside      Current Facility-Administered Medications:     HYDROmorphone (DILAUDID) injection 1 mg, 1 mg, IntraVENous, Q4H, Israel Soto MD, 1 mg at 06/23/22 0908    HYDROmorphone (DILAUDID) injection 1 mg, 1 mg, IntraVENous, Q15MIN PRN, Israel Soto MD, 1 mg at 06/20/22 1739    LORazepam (ATIVAN) injection 1 mg, 1 mg, IntraVENous, Q15MIN PRN, Israel Soto MD    glycopyrrolate (ROBINUL) injection 0.4 mg, 0.4 mg, IntraVENous, Q4H, Israel Soto MD, 0.4 mg at 06/23/22 0910    bisacodyL (DULCOLAX) suppository 10 mg, 10 mg, Rectal, DAILY PRN, Israel Soto MD    LORazepam (ATIVAN) injection 1 mg, 1 mg, IntraVENous, Q4H, Israel Soto MD, 1 mg at 06/23/22 0907    saline peripheral flush soln 5 mL, 5 mL, InterCATHeter, PRN, Israel Soto MD    scopolamine (TRANSDERM-SCOP) 1 mg over 3 days 1 Patch, 1 Patch, TransDERmal, Q72H, Israel Soto MD, 1 Patch at 06/22/22 1705      Signed By: Rubén Skinner     June 23, 2022

## 2022-06-23 NOTE — PROGRESS NOTES
Bedside shift change report given to Rossi Wasserman RN (oncoming nurse) by Kristin Lomas RN (offgoing nurse). Report included the following information SBAR, Procedure Summary, Intake/Output, MAR, Recent Results and Med Rec Status.

## 2022-06-24 NOTE — PROGRESS NOTES
Bedside shift change report given to Izabel Foley RN (oncoming nurse) by Fernando Smith RN (offgoing nurse). Report included the following information SBAR, Intake/Output, MAR, Accordion, Recent Results and Med Rec Status.

## 2022-06-24 NOTE — DISCHARGE SUMMARY
General Daily Progress Note          Patient Name:   Elmer Jimenes       YOB: 1945       Age:  78 y.o. Admit Date: 5/14/2022      Subjective:     78years old female with significant past medical history of CVA with PEG tube chronic kidney disease CVA with right-sided weakness bedbound DVT of the left great toe status post removal of the left great and second toe history of aspiration pneumonia history of sacral decubitus ulcer patient was recently discharged from the hospitalPatient discharged to nursing home upon p.o. Cipro    Admitted again yesterday concerning for worsening of sacral ulcer    During last admission patient was seen by infectious disease and also seen by the vascular surgeon patient had debridement of wound during the last admission    Patient seen by the infectious disease yesterday    Sacral wound infection recent cultures growing Pseudomonas resistant to Zosyn and meropenem    5/17    Patient alert awake not in distress  Patient was seen by the vascular surgeon    Vascular surgery planned for laparoscopic loop colostomy placement and sacral debridement then wound vacuum  Also try to close the wound on the left foot with TMA    5/18    Resting in the bed not in any distress  Blood sugars running high    Seen by infectious disease continue vancomycin and start on Unasyn    5/19    And went to the OR intubated because of elevated LFT and elevated INR  Surgery was canceled    On ventilator 40% O2  Propofol drip    5/20    Patient on ventilator with 30% O2  On propofol drip    Hemoglobin dropped to 6.8    Patient's daughter at the bedside    5/20    Patient on ventilator 30% O2  On propofol drip  Getting PEG tube feeding    5/21    Patient still on ventilator 30% O2  On propofol drip  Liver Function improving    5/22  On ventilator with 30% O2 on propofol drip    5/24  Awaiting tissue culture results.    On ventilator with 30% O2 on propofol drip  Left transmetatarsal amputation and Sacral wound excisional wound debridement 13 x 15 cm to the bone completed yesterday. CXR: Hazy mid and lower lung reticular markings.  Dependent small to moderate volume,  right greater than left pleural effusions  Remarkable labs   WBC: 15.2   Hgb: 8.5   Na; 148  CRP: 13.60   Procal: 0.71      5/25    Patient on ventilator with 30% O2  Seen by the vascular surgeon and plan for surgery today    5/26  Patient on ventilator with 30% O2  Hypotensive on Levophed  On propofol drip    Surgery was canceled yesterday because patient unstable hemodynamically not cleared by the anesthesia    5/27    Patient on ventilator with 30% O2  Hypotensive on Levophed  Getting PEG tube feeding    5/30     Patient on ventilator with 21% O2  On propofol drip  Off pressor    5/31    Patient on ventilator with 21% O2  On propofol drip    Off pressor  Patient have a wound vacuum      6/1    Patient scheduled for surgery today    6/2    Surgery was canceled yesterday  As overall prognosis very poor  Patient on vent on 21% O2  Getting PEG tube feeding  Wound vacuum intact      6/3    Patient still on ventilator  Seen by vascular surgeon left foot concern of ischemic changes    6/4    Patient extubated /tachypneic daughter at the bedside    6/5    Patient on BiPAP  30% O2    6/6    Patient on BiPAP 30% O2    Awake not in distress    6/7    Still on BiPAP 30% O2  Awake    6/8    Patient alert awake on BiPAP 30% O2 patient's daughter at the bedside  Still waiting for family decision regarding further treatment plan  Regarding hospice    6/9    Patient sleeping in the bed on BiPAP 30% percent O2  Wound vacuum in place    6/10    Patient off BiPAP on nasal cannula 3 L    6/11    On nasal cannula oxygen saturation 99%  Patient spiked fever 102.7 early this morning          Objective:     Visit Vitals  BP (!) 149/57 (BP 1 Location: Right upper arm, BP Patient Position: At rest)   Pulse 77   Temp 98.9 °F (37.2 °C)   Resp 20   Ht 5' 6.93\" (1.7 m)   Wt 97.4 kg (214 lb 11.7 oz)   SpO2 100%   Breastfeeding No   BMI 33.70 kg/m²        No results found for this or any previous visit (from the past 24 hour(s)). [unfilled]      Review of Systems    Not a good historian e. Physical Exam:      Constitutional: On ventilator  HENT:   Head: Normocephalic and atraumatic. Eyes: Pupils are equal, round, and reactive to light. EOM are normal.   Cardiovascular: Normal rate, regular rhythm and normal heart sounds. Pulmonary/Chest: Breath sounds normal. No wheezes. No rales. Exhibits no tenderness. Abdominal: Soft. Bowel sounds are normal. There is no abdominal tenderness. There is no rebound and no guarding. Musculoskeletal: Normal range of motion. Neurological: On ventilator   skin sacral decubitus ulcer and left foot wound right big toe amputation    XR CHEST PORT   Final Result   1. Shallow depth of inspiration with improving interstitial markings but new   area of atelectasis or infiltrate in the right upper lobe adjacent to the minor   fissure. XR CHEST PORT   Final Result      XR CHEST PORT   Final Result   1. There has been an improvement in the pulmonary edema pattern particularly   within the left perihilar region. There is persistent milder interstitial edema   within the right lung. 2.  This examination is negative for new pulmonary pathology or additional   interval changes. XR CHEST PORT   Final Result      XR CHEST PORT   Final Result   Pulmonary vascular congestion and predominantly perihilar opacities,   increased. XR CHEST PORT   Final Result   Worsening lung aeration. XR CHEST PORT   Final Result   No significant change. XR CHEST PORT   Final Result   No significant change. XR CHEST PORT   Final Result      XR CHEST PORT   Final Result      XR CHEST PORT   Final Result   No interval change or acute process.       XR CHEST PORT   Final Result   ET tube retracted from previous, with tip now at the level of the   thoracic inlet, 8-9 cm above the jennifer. Stable appearance of right central   line. Stable mild enlargement of cardiopericardial silhouette. No evidence of   pulmonary edema, air space pneumonia, or pleural effusion. No evidence of   pneumothorax. Some structures are partially obscured by overlying monitoring   electrodes. XR CHEST PORT   Final Result   Basilar airspace disease, possibly subsegmental atelectasis, stable. XR CHEST PORT   Final Result   Cardiomegaly with vascular congestion. Stable appearance of ET tube. Right central line placed, without radiographic evidence of complication. IR INSERT NON TUNL CVC OVER 5 YRS   Final Result   Successful placement of a triple-lumen central venous catheter. The   catheter is ready for use. XR CHEST PORT   Final Result      XR CHEST PORT   Final Result      XR CHEST PORT   Final Result      CT ABD PELV WO CONT   Final Result   Third spacing of fluids with small volume ascites, small-moderate   pleural effusions, and mild body wall edema. Bibasilar atelectasis. XR CHEST PORT   Final Result   Similar findings compared to single view chest 1 day earlier. XR CHEST PORT   Final Result   Findings/IMPRESSION: Stable appearance of endotracheal tube. Stable mild   enlargement of cardiomediastinal silhouette. Central vascular congestion with   bilateral perihilar and basilar opacities, consistent with edema and/or   pneumonia, right greater than left. Possible right pleural effusion. No   pneumothorax. XR CHEST PORT   Final Result   Bibasilar opacities, possibly increased on the right. XR CHEST PORT   Final Result   No significant change allowing for difference in technique and   positioning. Single portable AP view compared to yesterday. Suboptimal inspiratory film   compromises visualization of the lower lung fields. Monitoring equipment   overlies the body.    The tip of the tube is approximately 3 cm above the jennifer. Mild apparent cardiomegaly. Left heart border and left diaphragm obscured. Hazy airspace opacification both lung bases may be a combination of atelectasis,   pneumonia, or edema. No large effusion. Negative for pneumothorax. XR CHEST PORT   Final Result   No significant change allowing for difference in technique and   positioning. Single portable AP view compared to yesterday. Rotation to the right. Monitoring   equipment overlies the body. Suboptimal inspiratory film compromises   visualization of the lower lung fields. Mild cardiomegaly. The tip of the ET tube is approximately 3 cm above the jennifer. Mild basal interstitial edema. No new consolidation. No pleural effusion or   pneumothorax. XR CHEST PORT   Final Result   1. The endotracheal tube is in satisfactory position. 2.  There has been a partial interval resolution in the pulmonary interstitial   edema pattern. XR CHEST PORT   Final Result   Ill-defined haziness in the mid to lower lung zones suspect for mild   atelectatic changes and/or interstitial fluid or pleural fluid. US ABD LTD   Final Result   1. Question pericholecystic fluid. No identified gallstones or sludge. 2. Right pleural effusion. 3. Increased right renal echotexture for medical renal disease. XR CHEST PORT   Final Result   Intubation. Findings suggesting hydrostatic edema. XR CHEST PORT   Final Result   No evidence of an acute cardiopulmonary process. IR FLUORO GUIDE PLC CVAD    (Results Pending)   IR US GUIDED VASCULAR ACCESS    (Results Pending)        No results found for this or any previous visit (from the past 24 hour(s)).     Results     Procedure Component Value Units Date/Time    CULTURE, URINE [[id number]] Collected: 06/14/22 1230    Order Status: Completed Specimen: Urine Updated: 06/16/22 8532     Special Requests: --        No Special Requests  Reflexed from [id number]       Culture result: No Growth (<1000 cfu/mL)       CULTURE, BLOOD [[id number]] Collected: 06/13/22 2024    Order Status: Completed Specimen: Blood Updated: 06/20/22 0829     Special Requests: --        No Special Requests  Right       Culture result: No growth 6 days              Labs:     No results for input(s): WBC, HGB, HCT, PLT, HGBEXT, HCTEXT, PLTEXT, HGBEXT, HCTEXT, PLTEXT in the last 72 hours. No results for input(s): NA, K, CL, CO2, BUN, CREA, GLU, CA, MG, PHOS, URICA in the last 72 hours. No results for input(s): ALT, AP, TBIL, TBILI, TP, ALB, GLOB, GGT, AML, LPSE in the last 72 hours. No lab exists for component: SGOT, GPT, AMYP, HLPSE  No results for input(s): INR, PTP, APTT, INREXT, INREXT in the last 72 hours. No results for input(s): FE, TIBC, PSAT, FERR in the last 72 hours. No results found for: FOL, RBCF   No results for input(s): PH, PCO2, PO2 in the last 72 hours. No results for input(s): CPK, CKNDX, TROIQ in the last 72 hours.     No lab exists for component: CPKMB  Lab Results   Component Value Date/Time    Cholesterol, total 124 03/08/2022 07:40 AM    HDL Cholesterol 30 03/08/2022 07:40 AM    LDL,Direct 79 06/28/2021 12:00 AM    LDL, calculated 44.8 03/08/2022 07:40 AM    Triglyceride 202 (H) 05/16/2022 06:20 AM    CHOL/HDL Ratio 4.1 03/08/2022 07:40 AM     Lab Results   Component Value Date/Time    Glucose (POC) 236 (H) 06/16/2022 03:58 PM    Glucose (POC) 278 (H) 06/16/2022 06:16 AM    Glucose (POC) 347 (H) 06/15/2022 11:28 PM    Glucose (POC) 321 (H) 06/15/2022 08:49 PM    Glucose (POC) 308 (H) 06/15/2022 05:24 PM     Lab Results   Component Value Date/Time    Color Yellow/Straw 06/14/2022 12:30 PM    Appearance Clear 06/14/2022 12:30 PM    Specific gravity 1.011 06/14/2022 12:30 PM    pH (UA) 5.0 06/14/2022 12:30 PM    Protein Negative 06/14/2022 12:30 PM    Glucose 50 (A) 06/14/2022 12:30 PM    Ketone Negative 06/14/2022 12:30 PM    Bilirubin Negative 06/14/2022 12:30 PM    Urobilinogen 0.1 06/14/2022 12:30 PM Nitrites Negative 06/14/2022 12:30 PM    Leukocyte Esterase Small (A) 06/14/2022 12:30 PM    Bacteria Negative 06/14/2022 12:30 PM    WBC 20-50 06/14/2022 12:30 PM    RBC 0-5 06/14/2022 12:30 PM         Assessment:   Acute respiratory failure extubated on nasal cannula   sacral decubitus ulcer postdebridement  Sepsis with leukocytosis elevated procalcitonin and CRP  Left foot wound  Recent removal of left great toe and second toe  CVA with PEG tube placement  History of aspiration pneumonia  History of chronic kidney disease  CAD with ejection fraction about 20 to 25%  Hypertension  Hyperlipidemia  Anemia of chronic disease  Hyperkalemia  Static post transfusion  Uncontrolled diabetes  Hepatic shock/improving  Coagulopathy  Elevated  Troponin  Bacteremia with coagulase-negative Staphylococcus  Sacral wound secondary to Acinetobacter  and Enterococcus  Procedure:  1. Left transmetatarsal amputation. 2.  Sacral wound excisional wound debridement 13 x 15 cm to the bone.     Hypotension off  Levophed  Hypokalemia/replace potassium  Severe protein calorie malnutrition  Hypernatremia  Plan:     Water flush 200 cc every 4 hours  Unasyn 3 g IV every 8 hours  Aspirin 81 mg daily  FiberCon 625 mg daily  Questran 4 g twice a day  Lovenox 40 subcu daily  Pepcid 20 twice daily  Ferrous sulfate 325 mg twice a day  Flucanazole 200 mg every 24 hours  Lasix 40 mg every 12 hours  Gabapentin 300 mg twice a day  Robinul 0.1 mg every 6 hours  Hydralazine 12.5 mg 3 times a day  Lantus 10 units subcu at bedtime  Lantus 50 units subcu daily  Lopressor 12.5 twice daily  Entresto 1 tab twice a day  Repeat  the labs overall prognosis very poor    Detailed discussion with the patient's daughter about poor prognosis   At bedside  laparoscopic loop colostomy for fecal diversion canceled    Monitor H&H    Patient's start fevers seen by the infectious disease  Started on IV AVYCAZ and continue flucanazole till today    Discussed with the patient daughter at the bedside  Wound vacuum in place    Overall prognosis poor    Family discussed with the hospice nurse and plan for discharge patient in hospice care from Thursday    Discussed with the patient family at the bedside waiting for hospice meeting and discharge patient to hospice service    Patient transferred to hospice service             No current facility-administered medications for this encounter. No current outpatient medications on file.     Facility-Administered Medications Ordered in Other Encounters:     HYDROmorphone (DILAUDID) injection 2 mg, 2 mg, IntraVENous, Q4H, Israel Soto MD, 2 mg at 06/24/22 0831    HYDROmorphone (DILAUDID) injection 1 mg, 1 mg, IntraVENous, Q15MIN PRN, Isarel Soto MD, 1 mg at 06/20/22 1739    LORazepam (ATIVAN) injection 1 mg, 1 mg, IntraVENous, Q15MIN PRN, Israel Soto MD    glycopyrrolate (ROBINUL) injection 0.4 mg, 0.4 mg, IntraVENous, Q4H, Israel Soto MD, 0.4 mg at 06/24/22 0831    bisacodyL (DULCOLAX) suppository 10 mg, 10 mg, Rectal, DAILY PRN, Israel Soto MD    LORazepam (ATIVAN) injection 1 mg, 1 mg, IntraVENous, Q4H, Israel Soto MD, 1 mg at 06/24/22 2604    saline peripheral flush soln 5 mL, 5 mL, InterCATHeter, PRN, Israel Soto MD    scopolamine (TRANSDERM-SCOP) 1 mg over 3 days 1 Patch, 1 Patch, TransDERmal, Q72H, Israel Soto MD, 1 Patch at 06/22/22 1705

## 2022-06-24 NOTE — PROGRESS NOTES
Problem: Risk for Spread of Infection  Goal: Prevent transmission of infectious organism to others  Description: Prevent the transmission of infectious organisms to other patients, staff members, and visitors. Outcome: Progressing Towards Goal     Problem: Patient Education:  Go to Education Activity  Goal: Patient/Family Education  Outcome: Progressing Towards Goal     Problem: Emotional Support Needs  Goal: Patient/family is receiving emotional support  Description: Pt's daughter Danika Hollidayns and family will receive emotional support and supportive counseling in processing pt's terminal prognosis within five days. Outcome: Progressing Towards Goal     Problem: Pressure Injury - Risk of  Goal: *Prevention of pressure injury  Description: Document Shakir Scale and appropriate interventions in the flowsheet.   Outcome: Progressing Towards Goal  Note: Pressure Injury Interventions:  Sensory Interventions: Minimize linen layers,Float heels,Keep linens dry and wrinkle-free    Moisture Interventions: Internal/External urinary devices,Minimize layers,Apply protective barrier, creams and emollients,Absorbent underpads    Activity Interventions: Pressure redistribution bed/mattress(bed type)    Mobility Interventions: HOB 30 degrees or less    Nutrition Interventions: Document food/fluid/supplement intake,Offer support with meals,snacks and hydration    Friction and Shear Interventions: Apply protective barrier, creams and emollients                Problem: Patient Education: Go to Patient Education Activity  Goal: Patient/Family Education  Outcome: Progressing Towards Goal     Problem: Hospice Orientation  Goal: Demonstrate understanding of hospice philosophy, plan of care, and home hospice program  Description: The patient/family/caregiver will demonstrate understanding of hospice philosophy, plan of care and the home hospice program as evidenced by participation in meeting the patient's psychosocial, spiritual, medical, and physical needs inclusive of medical supplies/equipment focusing on symptoms. Outcome: Progressing Towards Goal     Problem: Pain  Goal: *Control of acute pain  Outcome: Progressing Towards Goal     Problem: Breathing Pattern - Ineffective  Goal: *Use of effective breathing techniques  Outcome: Progressing Towards Goal     Problem: General Wound Care  Goal: *Infected Wound: Prevention of further infection  Description: Infection control procedures (eg: clean dressings, clean gloves, hand washing, precautions to isolate wound from contamination, sterile instruments used for wound debridement) should be implemented. Outcome: Progressing Towards Goal  Goal: Interventions  Outcome: Progressing Towards Goal     Problem: Infection - Risk of, Urinary Catheter-Associated Urinary Tract Infection  Goal: *Absence of infection signs and symptoms  Outcome: Progressing Towards Goal     Problem: Discharge Planning  Goal: *Participates in discharge planning  Outcome: Progressing Towards Goal     Problem: Falls - Risk of  Goal: *Absence of Falls  Description: Document Maryann Fall Risk and appropriate interventions in the flowsheet.   Outcome: Progressing Towards Goal     Problem: Patient Education: Go to Patient Education Activity  Goal: Patient/Family Education  Outcome: Progressing Towards Goal     Problem: Breathing Pattern - Ineffective  Goal: *Use of effective breathing techniques  Outcome: Progressing Towards Goal     Problem: Pain  Goal: *Control of acute pain  Outcome: Progressing Towards Goal     Problem: Pressure Injury - Risk of  Goal: *Prevention of pressure injury  Outcome: Progressing Towards Goal     Problem: Grieving  Goal: *Able to express feelings of grief  Outcome: Progressing Towards Goal  Goal: *Able to identify stages of grieving process  Outcome: Progressing Towards Goal     Problem: Mood - Altered  Goal: *Alleviation of anxiety and depressive symptoms  Outcome: Progressing Towards Goal     Problem: Discharge Planning  Goal: *Participates in discharge planning  Outcome: Progressing Towards Goal     Problem: Patient Education: Go to Patient Education Activity  Goal: Patient/Family Education  Outcome: Progressing Towards Goal

## 2022-06-24 NOTE — PROGRESS NOTES
Progress Note    Patient: Julio Alves MRN: [id number]  SSN: [id number]    YOB: 1945  Age: 78 y.o. Sex: female      Admit Date: 6/16/2022    LOS: 8 days     Subjective:   Patient is a 69 y. o. year old female   78years old female with significant past medical history of CVA with PEG tube chronic kidney disease CVA with right-sided weakness bedbound DVT of the left great toe status post removal of the left great and second toe history of aspiration pneumonia history of sacral decubitus ulcer     Patient was transferred to hospice service/     6/18  Patient asleep in bed, NAD with family at bedside who report no changes  Family members met with       6/19     Resting comfortably family at the bedside     6/20  Resting comfortably in bed, family at bedside. NAD    6/21  Comfortably in bed family at bedside no acute distress  Hospice met with family-decided not to increase hydromorphone  infectious disease discontinued all antibiotics    6/22  Comfortably in bed family at bedside no acute distress  CM contacted due to belief that patient will not pass in next few days and should be moved to outpatient hospice- CM spoke to RN hospice, Domo Sommer, who said patient will remain at [facility] until Friday (10 days under GIP), and if patient still needs hospice the hospice RN will soheila placement. 6/23  Patient sleeping comfortably in bed with family at bedside- no acute distress. Family asking questions about her status- answered to satsifaction. Hemodynamically stable. Objective:     Vitals:    06/22/22 2315 06/23/22 0911 06/23/22 1651 06/24/22 0706   BP: (!) 165/66 (!) 163/79 (!) 179/64 (!) 159/78   Pulse: 80 68 73 66   Resp: 19 18 18 20   Temp: 97.6 °F (36.4 °C) 97.6 °F (36.4 °C) 97.4 °F (36.3 °C) 97.4 °F (36.3 °C)   SpO2: 100%   99%   Weight:       Height:            Intake and Output:  Current Shift: No intake/output data recorded.   Last three shifts: No intake/output data recorded. Review of Symptoms:  Unable to obtain. Physical Exam:   Constitutional:  Normal appearance. HENT:      Head: Normocephalic and atraumatic. Nose: Nose normal.      Mouth/Throat:      Mouth: Mucous membranes are moist.      Pharynx: Oropharynx is clear. Eyes: PERRLA. Conjunctivae normal.   Cardiovascular:      Rate and Rhythm: Normal rate and regular rhythm. Pulses: Normal pulses. Heart sounds: Normal heart sounds. Pulmonary:      Effort: Pulmonary effort is normal.      Breath sounds: Normal breath sounds. Abdominal:      General: Bowel sounds are normal. There is no distension. Palpations: Abdomen is soft. There is no mass. Tenderness: There is no abdominal tenderness. There is no guarding or rebound. Musculoskeletal:         General: Normal range of motion. Cervical back: Normal range of motion. Skin:     General: Skin is warm and dry. Capillary refill normal.  Neurological:      General: No focal deficit present. Mental Status: She is alert and oriented to person, place, and time. Psychiatric:         Mood and Affect: Mood normal.         Behavior: Behavior normal.         Thought Content: Thought content normal.         Judgment: Judgment normal.     Lab/Data Review:    No results found for this or any previous visit (from the past 12 hour(s)). No results found.     Assessment:   Acute respiratory failure extubated on nasal cannula   sacral decubitus ulcer postdebridement  Sepsis with leukocytosis elevated procalcitonin and CRP  Left foot wound  Recent removal of left great toe and second toe  CVA with PEG tube placement  History of aspiration pneumonia  History of chronic kidney disease  CAD with ejection fraction about 20 to 25%  Hypertension  Hyperlipidemia  Anemia of chronic disease  Hyperkalemia  Static post transfusion  Uncontrolled diabetes  Hepatic shock/improving  Coagulopathy  Elevated  Troponin  Bacteremia with coagulase-negative Staphylococcus  Sacral wound secondary to Acinetobacter  and Enterococcus  Procedure:  1.  Left transmetatarsal amputation. 2.  Sacral wound excisional wound debridement 13 x 15 cm to the bone.     Plan:   Continue comfort care with hospice  Discussed with patients daughters at bedside      Family decided  to discharge home with hospice on Monday      Current Facility-Administered Medications:     HYDROmorphone (DILAUDID) injection 2 mg, 2 mg, IntraVENous, Q4H, Israel Soto MD, 2 mg at 06/24/22 0831    HYDROmorphone (DILAUDID) injection 1 mg, 1 mg, IntraVENous, Q15MIN PRN, Israel Soto MD, 1 mg at 06/20/22 1739    LORazepam (ATIVAN) injection 1 mg, 1 mg, IntraVENous, Q15MIN PRN, Israel Soto MD    glycopyrrolate (ROBINUL) injection 0.4 mg, 0.4 mg, IntraVENous, Q4H, Israel Soto MD, 0.4 mg at 06/24/22 0831    bisacodyL (DULCOLAX) suppository 10 mg, 10 mg, Rectal, DAILY PRN, Israel Soto MD    LORazepam (ATIVAN) injection 1 mg, 1 mg, IntraVENous, Q4H, Israel Soto MD, 1 mg at 06/24/22 3999    saline peripheral flush soln 5 mL, 5 mL, InterCATHeter, PRN, Israel Soto MD    scopolamine (TRANSDERM-SCOP) 1 mg over 3 days 1 Patch, 1 Patch, TransDERmal, Q72H, Sesar Oviedo MD, 1 Patch at 06/22/22 1705      Signed By: [id number] Raji Madera MD     June 24, 2022

## 2022-06-24 NOTE — HOSPICE
[facility]  [phone]     Patient Name: Nandini Morales  YOB: 1945  Age: 78 y.o. [address] [facility] RN Note:    Patient to be discharged home on Monday with transfer from [facility] to [facility]. Spoke with Merna and informed we would change IV Dilaudid and Ativan to PO/SL to allow for adjustment prior to discharge. Per Travon Castañeda DME to be delivered through [facility] on Sunday. Left VM for Joaquin Zepeda ([phone]) to confirm acceptance of patient and provide clinical update. CM to arrange transportation for Monday morning.      Randa Newby RN  Hospice Liaison  [phone]

## 2022-06-24 NOTE — PROGRESS NOTES
Old dressing removed from sacrum, cleansed with NS, pat dry, packed with NS wet to dry gauze , covered with mepilex

## 2022-06-24 NOTE — PROGRESS NOTES
[address] Visit Note:      This LMSW, hospice liaison Paulo Whitney, Dr. Meli Nicholas, and other members of the care team met with pt's family outside of the room. Family considering options for pt (home hospice vs [facility] vs revoking hospice). Family plan to discuss amongst themselves and let team know of decision. LMSW visited again later in the afternoon. Pt resting peacefully. No needs, as family is still planning next step.       Meg Candelaria, [facility]    [phone]

## 2022-06-24 NOTE — PROGRESS NOTES
Current plan is for patient to transfer to [facility] on Monday 6/27/2022. CM sent clinicals via [employer] for review.

## 2022-06-24 NOTE — HOSPICE
[facility]  [phone]    GIP Daily Nursing Note   Patient Name: Lenin Patel  YOB: 1945  Age: 78 y.o. Date of Visit: 06/24/22  Facility of Care: [facility]  Patient Room: 570/01     Hospice Attending: Abril Sherwood MD  Hospice Diagnosis: Sepsis ([address] [A41.9]    Level of Care: GIP    Current GIP Symptoms      1. Non verbal pain  2. Secretions  3. Unresponsive        ASSESSMENT & PLAN     1. Hydromorphone 2 mg IV every 4 hours with prn available  2. Lorazepam 1 mg IV every 4 hours and prn  3. Robinul 0.4 mg IV every 4 hours  4. Contact precautions      Spiritual Interventions: Hospital and hospice chaplains continue to provide spiritual support to family at bedside. Psych/ Social/ Emotional Interventions: SW providing supportive counseling through visits and phone calls    Care Coordination Needs: Awaiting family decision regarding disposition    Care plan and New Orders discussed / approved with Kateryna Barrera MD.    Description History and Chart Review     Narrative History of last 24 hours that demonstrates care cannot be provided in another setting:  Patient continues to require frequent nursing assessment/intervention and management of IV meds that cannot be administered in the home. What has been done to control the patient's symptoms in the last 24 hours? Dilaudid increased to manage pain    Does the patient currently require IV medications? yes  Does the patient currently require scheduled medications? yes  Does the patient currently require a PCA?  no    List number of doses of PRN medications in last 24 hours:  Medication 1:  Number of doses:    Medication 2:   Number of doses:    Medication 3:   Number of doses:    Supporting documentation for GIP need for pain control:  [x] Frequent evaluation by a doctor, nurse practitioner, nurse   [x] Frequent medication adjustment    [x] IVs that cannot be administered at home   [] Aggressive pain management   [] Complicated technical delivery of medications              Supporting documentation for GIP need for symptom control:  [x]  Sudden decline necessitating intensive nursing intervention  []  Uncontrolled / intractable nausea or vomiting   []  Pathological fractures  []  Advanced open wounds requiring frequent skilled care  [] Unmanageable respiratory distress  [] New or worsening delirium   [] Delirium with behavior issues: Is 24 hour caregiver present due to safety concerns with agitation? (yes/no)  [x] Imminent death - with skilled nursing needs documented above    DISCHARGE PLANNING     1. Discharge Plan: Discharge to family versus LTC  2. Patient/Family teaching: Symptom management  3. Response to patient/family teaching: Verbalized understanding    ASSESSMENT    KARNOFSKY: 10    Prognosis estimated based on 06/24/22 clinical assessment is:   [] Few to Many Hours  [x] Hours to Days   [] Few to Many Days   [] Days to Weeks   [] Few to Many Weeks   [] Weeks to Months   [] Few to Many Months    Quality Measure: Patient self-reports:  [] Yes    [x] No    ESAS:   Time of Assessment: 9:30  Pain (1-10): 4  Fatigue (1-10):   Shortness of breath (1-10): 2  Nausea (1-10): Appetite (1-10):    Anxiety: (1-10):   Depression: (1-10):   Well-being: (1-10):   Constipation: _ Yes  _ No    CLINICAL INFORMATION   Patient Vitals for the past 12 hrs:   Temp Pulse Resp BP SpO2   06/24/22 0706 97.4 °F (36.3 °C) 66 20 (!) 159/78 99 %       Currently this patient has:  [x] Supplemental O2   [x] IV    [] PICC      [] PORT   [] NG Tube    [] PEG Tube   [] Ostomy     [x] Bell draining yellow urine  [] Other:     SIGNS/PHYSICAL FINDINGS     Skin (including wound):  [] Warm, dry, supple, intact and color normal for race  [x] Warm   [x] Dry   [] Cool     [] Clammy       [] Diaphoretic    Turgor   [] Normal   [x] Decreased  Color:   [] Pink   [] Pale   [] Cyanotic   [] Erythema   [] Jaundice   [x] Normal for Race  [x] Wounds    Neuro:  [] Lethargy  [] Restlessness / agitation  [] Confusion / delirium  [] Hallucinations  [] Responds to maximal stimulation  [x] Unresponsive  [] Seizures     Cardiac:  [] Dyspnea on Exertion  [] JVD  [] Murmur  [] Palpitations  [] Hypotension  [x] Hypertension  [x] Tachycardia  [] Bradycardia  [] Irregular HR  [] Pulses Decreased  [] Pulses Absent  [] Edema:         [] Mottling:          Respiratory:  Breath sounds:    [] Diminished   [x] Wheeze   [x] Rhonchi   [] Rales   [] Even and unlabored  [x] Labored  [] Cough   [] Non Productive   [] Productive    [] Description:           [] Deep suctioned   [x] O2 at 1 LPM  [] High flow oxygen greater than 10 LPM  [] Bi-Pap    GI  [] Abdomen (describe)   [] Ascites  [] Nausea  [] Vomiting  [x] Incontinent of bowels  [x] Bowel sounds hypoactive  [] Diarrhea  [] Constipation (see above including last bowel movement)  [] Checked for impaction  [x] Last BM 6/23    Nutrition  Diet: NPO  Appetite:   [] Good   [] Fair   [] Poor   [] Tube Feeding       [] Voiding  [] Incontinent   [x] Bell    Musculoskeletal  [] Balance/[place] Unsteady   [] Weak   Strength:    [] Normal    [] Limited    [] Decreasing   Activities:    [] Up as tolerated   [x] Bedridden    [] Specify:    SAFETY  [x] 24 hr. Caregiver   [x] Side rails ?     [x] Hospital bed   [x] Reviewed Falls & Safety       ALLERGIES AND MEDICATIONS     Allergies: No Known Allergies    Current Facility-Administered Medications   Medication Dose Route Frequency    HYDROmorphone (DILAUDID) injection 2 mg  2 mg IntraVENous Q4H    HYDROmorphone (DILAUDID) injection 1 mg  1 mg IntraVENous Q15MIN PRN    LORazepam (ATIVAN) injection 1 mg  1 mg IntraVENous Q15MIN PRN    glycopyrrolate (ROBINUL) injection 0.4 mg  0.4 mg IntraVENous Q4H    bisacodyL (DULCOLAX) suppository 10 mg  10 mg Rectal DAILY PRN    LORazepam (ATIVAN) injection 1 mg  1 mg IntraVENous Q4H    saline peripheral flush soln 5 mL  5 mL InterCATHeter PRN  scopolamine (TRANSDERM-SCOP) 1 mg over 3 days 1 Patch  1 Patch TransDERmal Q72H          Visit Time In: 9:30  Visit Time Out: 10:03

## 2022-06-24 NOTE — PROGRESS NOTES
Patient was seen yesterday 06/23/22 at request of team with hospice CALLIE Gong and NP Ms. Tim Kellogg. Pt under hospice care GIP with dx of Sepsis secondary to sacral decubitus ulcer that is infected and not healing. 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 Hx of aspiration pneumonia. Pt currently has been in hospice GIP x 8 days. Concerns were pain not well controlled and pt's not fully comfortable. Pt has a big family who are involved in her care. Met with pt;s 2 sons and one daughter at bedside and other one was on speaker phone. Family feels pt's pain is not controlled and she appears to be in distress. I examined pt and she does appear to be moaning and grimacing especially when touched/moved. I had originally started pt on dilaudid 1mg IV every 4 hours scheduled and every 15mts as needed that seemed to have helped some but not fully. Plan;  Recommend adjusting dilaudid up to 2mg IV scheduled every 4 hours and every 15mts as needed. Ativan 1mg IV  scheduled every 4 hours and every 15mts as needed. Provided supportive listening to family and they are in agreement with plan of care that was reviewed    Later today I was told that family is desiring to bring patient home and resume hospice services with [facility]. In this situation pt will need to be transitioned to oral medications via PEG tube for comfort   will switch dilaudid to 8mg oral liquid (use 4mg/ml concentration: 2ml) every 4 hours scheduled   Ativan 1mg/ml concentration: 1ml every 4 hours scheduled   Other comfort meds per hospice    Pt likely to be discharged to home with [facility] Monday.

## 2022-06-24 NOTE — PROGRESS NOTES
Shift Summary: No events occurred during shift. Pt turned throughout shift with family at bedside. Scheduled medications tolerated well. Bed low and locked with bed alarms on.

## 2022-06-25 NOTE — HOSPICE
[facility]  [phone]    GIP Daily Nursing Note   Patient Name: Yamini Ayala  YOB: 1945  Age: 78 y.o. Date of Visit: 06/25/22  Facility of Care: [facility]  Patient Room: 570/     Hospice Attending: Leon Jon MD  Hospice Diagnosis: Sepsis ([address] [A41.9]    Level of Care: GIP    Current GIP Symptoms      1. Non verbal pain  2. Secretions  3. Unresponsive        ASSESSMENT & PLAN   Patient is squinting her eyes when daughter is up and talking. Some moaning when listening to her. No rhonchi. 1. Hydromorphone 8 mg peg every 4 hours with prn available  2. Lorazepam 1 mg peg every 4 hours and prn  3. Robinul 0.4 mg IV every 4 hours  4. Contact precautions  5. Scopolamine patch    Spiritual Interventions: Hospital and hospice chaplains continue to provide spiritual support to family at bedside. Psych/ Social/ Emotional Interventions: SW providing supportive counseling through visits and phone calls    Care Coordination Needs: Awaiting family decision regarding disposition    Care plan and New Orders discussed / approved with Shahid Glover MD.    Description History and Chart Review     Narrative History of last 24 hours that demonstrates care cannot be provided in another setting:  Patient continues to require frequent nursing assessment/intervention and management of IV meds that cannot be administered in the home. What has been done to control the patient's symptoms in the last 24 hours? Dilaudid increased to manage pain    Does the patient currently require IV medications? yes  Does the patient currently require scheduled medications? yes  Does the patient currently require a PCA?  no    List number of doses of PRN medications in last 24 hours:  Medication 1:  Number of doses:    Medication 2:   Number of doses:    Medication 3:   Number of doses:    Supporting documentation for GIP need for pain control:  [x] Frequent evaluation by a doctor, nurse practitioner, nurse   [x] Frequent medication adjustment    [x] IVs that cannot be administered at home   [] Aggressive pain management   [] Complicated technical delivery of medications              Supporting documentation for GIP need for symptom control:  [x]  Sudden decline necessitating intensive nursing intervention  []  Uncontrolled / intractable nausea or vomiting   []  Pathological fractures  []  Advanced open wounds requiring frequent skilled care  [] Unmanageable respiratory distress  [] New or worsening delirium   [] Delirium with behavior issues: Is 24 hour caregiver present due to safety concerns with agitation? (yes/no)  [x] Imminent death - with skilled nursing needs documented above    DISCHARGE PLANNING     1. Discharge Plan: Discharge to family versus LTC, preparing for Monday home with family. 2. Patient/Family teaching: Symptom management  3. Response to patient/family teaching: Verbalized understanding    ASSESSMENT    KARNOFSKY: 10    Prognosis estimated based on 06/25/22 clinical assessment is:   [] Few to Many Hours  [x] Hours to Days   [] Few to Many Days   [] Days to Weeks   [] Few to Many Weeks   [] Weeks to Months   [] Few to Many Months    Quality Measure: Patient self-reports:  [] Yes    [x] No    ESAS:   Time of Assessment: 215  Pain (1-10): 4  Fatigue (1-10):   Shortness of breath (1-10): 2  Nausea (1-10): Appetite (1-10):    Anxiety: (1-10):   Depression: (1-10):   Well-being: (1-10):   Constipation: _ Yes  _ No    CLINICAL INFORMATION     Patient Vitals for the past 12 hrs:   Temp Pulse Resp BP SpO2   06/25/22 0757 97.8 °F (36.6 °C) 83 24 (!) 158/64 95 %       Currently this patient has:  [x] Supplemental O2   [x] IV    [] PICC      [] PORT   [] NG Tube    [x] PEG Tube   [] Ostomy     [x] Bell draining yellow urine  [] Other:     SIGNS/PHYSICAL FINDINGS     Skin (including wound):  [] Warm, dry, supple, intact and color normal for race  [x] Warm   [x] Dry   [] Cool     [] Clammy       [] Diaphoretic    Turgor   [] Normal   [x] Decreased  Color:   [] Pink   [] Pale   [] Cyanotic   [] Erythema   [] Jaundice   [x] Normal for Race  [x]  Wounds    Neuro:  [] Lethargy  [] Restlessness / agitation  [] Confusion / delirium  [] Hallucinations  [] Responds to maximal stimulation  [x] Unresponsive  [] Seizures     Cardiac:  [] Dyspnea on Exertion  [] JVD  [] Murmur  [] Palpitations  [] Hypotension  [x] Hypertension  [x] Tachycardia  [] Bradycardia  [] Irregular HR  [] Pulses Decreased  [] Pulses Absent  [] Edema:         [] Mottling:          Respiratory:  Breath sounds:    [] Diminished   [x] Wheeze   [] Rhonchi   [] Rales   [x] Even and unlabored  [] Labored  [] Cough   [] Non Productive   [] Productive    [] Description:           [] Deep suctioned   [x] O2 at 1 LPM  [] High flow oxygen greater than 10 LPM  [] Bi-Pap    GI  [] Abdomen (describe)   [] Ascites  [] Nausea  [] Vomiting  [x] Incontinent of bowels  [x] Bowel sounds hypoactive  [] Diarrhea  [] Constipation (see above including last bowel movement)  [] Checked for impaction  [x] Last BM 6/23    Nutrition  Diet: NPO  Appetite:   [] Good   [] Fair   [] Poor   [] Tube Feeding       [] Voiding  [] Incontinent   [x] Bell    Musculoskeletal  [] Balance/[place] Unsteady   [] Weak   Strength:    [] Normal    [] Limited    [] Decreasing   Activities:    [] Up as tolerated   [x] Bedridden    [] Specify:    SAFETY  [x] 24 hr. Caregiver   [x] Side rails ?     [x] Hospital bed   [x] Reviewed Falls & Safety       ALLERGIES AND MEDICATIONS     Allergies: No Known Allergies    Current Facility-Administered Medications   Medication Dose Route Frequency    HYDROmorphone (DILAUDID) tablet 8 mg  8 mg Oral Q4H    HYDROmorphone (DILAUDID) tablet 8 mg  8 mg Oral Q4H PRN    LORazepam (INTENSOL) 2 mg/mL oral concentrate 1 mg  1 mg SubLINGual Q4H    LORazepam (INTENSOL) 2 mg/mL oral concentrate 1 mg  1 mg SubLINGual Q4H PRN    glycopyrrolate (ROBINUL) injection 0.4 mg  0.4 mg IntraVENous Q4H    bisacodyL (DULCOLAX) suppository 10 mg  10 mg Rectal DAILY PRN    saline peripheral flush soln 5 mL  5 mL InterCATHeter PRN    scopolamine (TRANSDERM-SCOP) 1 mg over 3 days 1 Patch  1 Patch TransDERmal Q72H          Visit Time In: 4284  Visit Time Out: 2949

## 2022-06-25 NOTE — PROGRESS NOTES
Progress Note    Patient: Radha Richey MRN: [id number]  SSN: [id number]    YOB: 1945  Age: 78 y.o. Sex: female      Admit Date: 6/16/2022    LOS: 9 days     Subjective:     78years old patient with hypertension, diabetes mellitus type 2, multiple CVAs, bedbound, ischemia of both lower extremities, encephalopathy, sepsis nonverbal family member by the bedside    Objective:     Vitals:    06/23/22 1651 06/24/22 0706 06/24/22 2103 06/25/22 0757   BP: (!) 179/64 (!) 159/78 (!) 162/66 (!) 158/64   Pulse: 73 66 74 83   Resp: 18 20 20 24   Temp: 97.4 °F (36.3 °C) 97.4 °F (36.3 °C) 97.8 °F (36.6 °C) 97.8 °F (36.6 °C)   SpO2:  99%  95%   Weight:       Height:            Intake and Output:  Current Shift: No intake/output data recorded. Last three shifts: No intake/output data recorded. Physical Exam:   General:   Drowsy appears stated age. Eyes:  Conjunctivae/corneas clear. PERRL, EOMs intact. Fundi benign   Ears:  Normal TMs and external ear canals both ears. Nose: Nares normal. Septum midline. Mucosa normal. No drainage or sinus tenderness. Mouth/Throat: Lips, mucosa, and tongue normal. Teeth and gums normal.   Neck: Supple, symmetrical, trachea midline, no adenopathy, thyroid: no enlargment/tenderness/nodules, no carotid bruit and no JVD. Back:   Symmetric, no curvature. ROM normal. No CVA tenderness. Lungs:   Clear to auscultation bilaterally. Heart:  Regular rate and rhythm, S1, S2 normal, no murmur, click, rub or gallop. Abdomen:   Soft, non-tender. Bowel sounds normal. No masses,  No organomegaly. Extremities: Extremities normal, atraumatic, no cyanosis or edema. Pulses: 2+ and symmetric all extremities. Skin: Skin color, texture, turgor normal. No rashes or lesions   Lymph nodes: Cervical, supraclavicular, and axillary nodes normal.   Neurologic:  Diffuse weakness, sensation and reflexes throughout.    No results found for this or any previous visit (from the past 24 hour(s)). Lab/Data Review: All lab results for the last 24 hours reviewed. No results found for this or any previous visit (from the past 24 hour(s)).       left      Signed By: Nandini Morales MD     June 25, 2022          Assessment:     Active Problems:    Sepsis [facility]) (4/4/2022)    Above    Plan:   Continue present treatment discussed with family members by the bedside

## 2022-06-26 NOTE — PROGRESS NOTES
Progress Note    Patient: Chele Ng MRN: [id number]  SSN: [id number]    YOB: 1945  Age: 78 y.o. Sex: female      Admit Date: 6/16/2022    LOS: 10 days     Subjective:     78years old patient with hypertension, diabetes mellitus type 2, multiple CVAs, bedbound, ischemia of both lower extremities, encephalopathy, sepsis nonverbal family member by the bedside. Patient had episode of congestion yesterday with deep suction given and also patient was already on IV Robinul she is comfortable today  No  distress. Objective:     Vitals:    06/24/22 2103 06/25/22 0757 06/25/22 2005 06/26/22 0430   BP: (!) 162/66 (!) 158/64 135/63 129/61   Pulse: 74 83  94   Resp: 20 24 18 20   Temp: 97.8 °F (36.6 °C) 97.8 °F (36.6 °C) 98.8 °F (37.1 °C) 100.3 °F (37.9 °C)   SpO2:  95% 95% 96%   Weight:       Height:            Intake and Output:  Current Shift: No intake/output data recorded. Last three shifts: 06/24 1901 - 06/26 0700  In: -   Out: 300 [Urine:300]    Physical Exam:   General:   Drowsy appears stated age. Eyes:  Conjunctivae/corneas clear. PERRL, EOMs intact. Fundi benign   Ears:  Normal TMs and external ear canals both ears. Nose: Nares normal. Septum midline. Mucosa normal. No drainage or sinus tenderness. Mouth/Throat: Lips, mucosa, and tongue normal. Teeth and gums normal.   Neck: Supple, symmetrical, trachea midline, no adenopathy, thyroid: no enlargment/tenderness/nodules, no carotid bruit and no JVD. Back:   Symmetric, no curvature. ROM normal. No CVA tenderness. Lungs:   Clear to auscultation bilaterally. Heart:  Regular rate and rhythm, S1, S2 normal, no murmur, click, rub or gallop. Abdomen:   Soft, non-tender. Bowel sounds normal. No masses,  No organomegaly. Extremities: Extremities normal, atraumatic, no cyanosis or edema. Pulses: 2+ and symmetric all extremities.    Skin: Skin color, texture, turgor normal. No rashes or lesions   Lymph nodes: Cervical, supraclavicular, and axillary nodes normal.   Neurologic:  Diffuse weakness, sensation and reflexes throughout. No results found for this or any previous visit (from the past 24 hour(s)). Lab/Data Review: All lab results for the last 24 hours reviewed. No results found for this or any previous visit (from the past 24 hour(s)). left      Signed By: Samantha Rowland MD     June 26, 2022          Assessment:     Active Problems:    Sepsis [facility]) (4/4/2022)    Above    Plan:   Continue present treatment discussed with family members by the bedside  Hospice discharge in a.m.   Reconciliation of medications done discussed with family member by the bedside also the hospice team

## 2022-06-26 NOTE — PROGRESS NOTES
Spoke with Dr. Eduarda Ann, based on patient's current oral regimen (patient unable to swallow) recommended 0.2 mg IV q15 minues PRN or no more than 1 mg IV q1h prn. Dr. Eduarda Ann felt strongly that patient requires more than my recommendation. She then agreed to change to 2 mg IV q1h prn.  Patient is hospice patient

## 2022-06-26 NOTE — HOSPICE
[facility]  [facility]  [phone]    [facility] Daily Nursing Note   Patient Name: Jessenia Hudson  YOB: 1945  Age: 78 y.o. Date of Visit: 06/26/22  Facility of Care: [facility]  Patient Room: 570/01     Hospice Attending: Ti Styles MD  Hospice Diagnosis: Sepsis ([address] [A41.9]    Level of Care: GIP    Current GIP Symptoms      1. Non verbal pain  2. Secretions- needing suctioning last night. 3. unresponsive  4. Respiratory distress      ASSESSMENT & PLAN   Patient is squinting her eyes when daughter is up and talking. Some moaning when listening to her. Wheezing on rt side. Low grade fever this am. Removed blankets. Explained to daughter to let her cool from room air. 1. Hydromorphone 8 mg peg every 4 hours with prn available  2. Lorazepam 1 mg peg every 4 hours and prn  3. Robinul 0.4 mg IV every 4 hours  4. Contact precautions  5. Scopolamine patch  6.Dilaudid 2mg IV prn for break thru symptoms    Spiritual Interventions: Hospital and hospice chaplains continue to provide spiritual support to family at bedside. Psych/ Social/ Emotional Interventions: SW providing supportive counseling through visits and phone calls    Care Coordination Needs: family planning to take her home on Monday , transfering to [facility]. Equipment is being delivered today. Care plan and New Orders discussed / approved with Antonio Chin MD.    Description History and Chart Review     Narrative History of last 24 hours that demonstrates care cannot be provided in another setting:  Patient continues to require frequent nursing assessment/intervention and management of IV meds that cannot be administered in the home. Now using peg for scheduled medications for home transition. Had respiratory episode last night and needed suctioning. What has been done to control the patient's symptoms in the last 24 hours? Dilaudid increased to manage pain/ IV prn added for breakthru symptoms.     Does the patient currently require IV medications? yes  Does the patient currently require scheduled medications? yes  Does the patient currently require a PCA? no    List number of doses of PRN medications in last 24 hours:  Medication 1:dilaudid IV 1  Number of doses:    Medication 2: Dilaudid peg  Number of doses:1    Medication 3: Lorazepam   Number of doses:1  Supporting documentation for GIP need for pain control:  [x] Frequent evaluation by a doctor, nurse practitioner, nurse   [x] Frequent medication adjustment    [] IVs that cannot be administered at home   [] Aggressive pain management   [] Complicated technical delivery of medications              Supporting documentation for GIP need for symptom control:  [x]  Sudden decline necessitating intensive nursing intervention  []  Uncontrolled / intractable nausea or vomiting   []  Pathological fractures  []  Advanced open wounds requiring frequent skilled care  [x] Unmanageable respiratory distress  [] New or worsening delirium   [] Delirium with behavior issues: Is 24 hour caregiver present due to safety concerns with agitation? (yes/no)  [x] Imminent death - with skilled nursing needs documented above    DISCHARGE PLANNING     1. Discharge Plan: Discharge to family versus LTC, preparing for Monday home with family. 2. Patient/Family teaching: Symptom management  3. Response to patient/family teaching: Verbalized understanding    ASSESSMENT    KARNOFSKY: 10    Prognosis estimated based on 06/26/22 clinical assessment is:   [] Few to Many Hours  [x] Hours to Days   [] Few to Many Days   [] Days to Weeks   [] Few to Many Weeks   [] Weeks to Months   [] Few to Many Months    Quality Measure: Patient self-reports:  [] Yes    [x] No    ESAS:   Time of Assessment: 1015  Pain (1-10): 4  Fatigue (1-10):   Shortness of breath (1-10): 2  Nausea (1-10): Appetite (1-10):    Anxiety: (1-10):   Depression: (1-10):   Well-being: (1-10):   Constipation: _ Yes  _ No    CLINICAL INFORMATION     Patient Vitals for the past 12 hrs:   Temp Pulse Resp BP SpO2   06/26/22 0430 100.3 °F (37.9 °C) 94 20 129/61 96 %       Currently this patient has:  [x] Supplemental O2   [x] IV    [] PICC      [] PORT   [] NG Tube    [x] PEG Tube   [] Ostomy     [x] Bell draining yellow urine  [] Other:     SIGNS/PHYSICAL FINDINGS     Skin (including wound):  [] Warm, dry, supple, intact and color normal for race  [x] Warm   [x] Dry   [] Cool     [] Clammy       [] Diaphoretic    Turgor   [] Normal   [x] Decreased  Color:   [] Pink   [] Pale   [] Cyanotic   [] Erythema   [] Jaundice   [x] Normal for Race  [x]  Wounds    Neuro:  [] Lethargy  [] Restlessness / agitation  [] Confusion / delirium  [] Hallucinations  [] Responds to maximal stimulation  [x] Unresponsive  [] Seizures     Cardiac:  [] Dyspnea on Exertion  [] JVD  [] Murmur  [] Palpitations  [] Hypotension  [x] Hypertension  [x] Tachycardia  [] Bradycardia  [] Irregular HR  [] Pulses Decreased  [] Pulses Absent  [] Edema:         [] Mottling:          Respiratory:  Breath sounds:    [] Diminished   [x] Wheeze   [] Rhonchi   [] Rales   [x] Even and unlabored  [] Labored  [] Cough   [] Non Productive   [] Productive    [] Description:           [] Deep suctioned   [x] O2 at 1 LPM  [] High flow oxygen greater than 10 LPM  [] Bi-Pap    GI  [] Abdomen (describe)   [] Ascites  [] Nausea  [] Vomiting  [x] Incontinent of bowels  [x] Bowel sounds hypoactive  [] Diarrhea  [] Constipation (see above including last bowel movement)  [] Checked for impaction  [x] Last BM 6/23    Nutrition  Diet: NPO  Appetite:   [] Good   [] Fair   [] Poor   [] Tube Feeding       [] Voiding  [] Incontinent   [x] Bell    Musculoskeletal  [] Balance/[place] Unsteady   [] Weak   Strength:    [] Normal    [] Limited    [] Decreasing   Activities:    [] Up as tolerated   [x] Bedridden    [] Specify:    SAFETY  [x] 24 hr. Caregiver   [x] Side rails ?     [x] Hospital bed   [x] Reviewed Falls & Safety       ALLERGIES AND MEDICATIONS     Allergies: No Known Allergies    Current Facility-Administered Medications   Medication Dose Route Frequency    HYDROmorphone (DILAUDID) injection 2 mg  2 mg IntraVENous Q1H PRN    HYDROmorphone (DILAUDID) tablet 8 mg  8 mg Oral Q4H    HYDROmorphone (DILAUDID) tablet 8 mg  8 mg Oral Q4H PRN    LORazepam (INTENSOL) 2 mg/mL oral concentrate 1 mg  1 mg SubLINGual Q4H    LORazepam (INTENSOL) 2 mg/mL oral concentrate 1 mg  1 mg SubLINGual Q4H PRN    glycopyrrolate (ROBINUL) injection 0.4 mg  0.4 mg IntraVENous Q4H    bisacodyL (DULCOLAX) suppository 10 mg  10 mg Rectal DAILY PRN    saline peripheral flush soln 5 mL  5 mL InterCATHeter PRN    scopolamine (TRANSDERM-SCOP) 1 mg over 3 days 1 Patch  1 Patch TransDERmal Q72H          Visit Time In: 8862  Visit Time Out: 1100

## 2022-06-26 NOTE — PROGRESS NOTES
DC order noted. Chart reviewed. Hospice IP transfer to home with Hospice scheduled for Monday 06/27/2022. See Hospice RN note of 06/25/2022 for details. No intervention by CM required at this time. Please call [phone] if assist is needed this date.

## 2022-06-26 NOTE — ROUTINE PROCESS
I talked to Ruth Kaur at [facility] and she put me through to Dr. Sang Miller for orders for patient . The grandson is in room and she was having trouble breathing . I tried to suction her and she kept clamping down on yaunkers and then when I used the small tubing for trachs oral she clamped down also. I asked and was told that respiratory did not get involved with deep suctioning for hospice patients. I put the order in for Dilaudid 2 mg iv from Dr. Shirley Arredondo.

## 2022-06-26 NOTE — PROGRESS NOTES
Received care of this patient. Patient is resting with eyes closed. Antonino Figueroa is at bedside.

## 2022-06-26 NOTE — PROGRESS NOTES
Spoke with Dr. Gertrude Velasco and she gave an order to change Hydromorphone IV 2mg from [id number] PRN to q1hour PRN.  Patient is hospice patient

## 2022-06-27 PROCEDURE — 94762 N-INVAS EAR/PLS OXIMTRY CONT: CPT

## 2022-06-27 NOTE — PROGRESS NOTES
responded to pager at time of death. Larg Family present was in the room. Family was supporting one other through the grief.  offered prayer and support as more family arrived. Daughters shared about how they were getting the house ready to take her home on hospice. Family was tearful and support one another. Advised nurse to contact [facility] for any further referrals.     [address], [facility]    Please [facility]  in order to get in touch with  for any Spiritual Care Needs   [phone]   OR   Reach out to us on Perfect Serve at [facility] [employer] [facility].

## 2022-06-27 NOTE — HOSPICE
[facility]  [phone]    Discharge/Death Nursing Note   Patient Name: Malinda Rosado  YOB: 1945  Age: 78 y.o. Date of Death: 2022  Admitted Date: 2022  Time of Death: [address] of Care: [facility]  Level of Care: [facility]  Patient Room: [facility]     Hospice Attending: KAVON Soto MD  Hospice Diagnosis: Sepsis ([facility] [A41.9]    Death Pronouncement   Pronouncement of death completed by: Hiral Conti RN & Rhys Colinrdes RN    Agency staff was not present at the time of death    At the time of death the patient was documented as no heart sounds or lung sounds heard and no radial or brachial pulses were felt.      The pt  within [facility]  The following were notified of the patient's death: Multiple family members at bedside at time of death    Medications were disposed of per facility protocol     Discharge Summary   Discharge Reason: Death    Summary of Care Provided:    [x] Post mortem care provided by nursing   [x] Notification of  home by nursing supervisor  [] Referrals/Community resources provided:   [] Goals completed  [] Durable Medical Equipment vendor notified     Disciplines involved: [x] RN [x] SW [x]  [] VAUGHAN [] Vol [] PT [] OT [] ST [] BC    [x] IDT communication/notification    Attending Physician, Dr. Zoë Pappas, notified of death    Bereaved   On File

## 2022-06-27 NOTE — ROUTINE PROCESS
Patient's younger daughter running out of room and crying. Primary nurse Kacy Murillo RN went to assess the patient. Patient's older daughter came into the room stating she had just left the hospital when her sister called her back stating her mom passed. Primary nurse listened to the patient for one full minute. No heart sounds or lung sounds heard and no radial or brachial pulses were felt. Second nurse Rosanne Higgins RN assessed patient for one full minute as well. No heart sounds or lung sounds were heard and no pulses were felt. Dr. Kang Benitez notified of death at [facility]. [employer] coordinator Nessa Ki notified of death at [phone], Ruben approves to release body to  home at this time. Nursing supervisor Enedina Couch notified of death at [phone]. Heydi Castro notified of death at [phone].

## 2022-06-28 NOTE — HOSPICE
[address] Bereavement/Condolence Call: This LMSW called pt's daughter Ashley Woods to offer condolences and support. Ashley Woods reported she and family are doing okay. Ashley Woods reported she was present as pt . LMSW offered [facility] information for ongoing support.        Yeimy Bañuelos, [facility]    [phone]

## 2022-07-14 NOTE — DISCHARGE SUMMARY
Discharge Summary     Patient: Allyson Curtis MRN: [id number]  SSN: [id number]    YOB: 1945  Age: 78 y.o.   Sex: female       Admit Date: 6/16/2022    Discharge Date: 7/14/2022      Admission Diagnoses: Sepsis [facility]) [A41.9]    Discharge Diagnoses:   Problem List as of 6/26/2022 Date Reviewed: 5/18/2022          Codes Class Noted - Resolved    Sacral ulcer ([facility] ICD-10-CM: X60.347  ICD-9-CM: 707.8  5/14/2022 - Present        Hyperkalemia ICD-10-CM: E87.5  ICD-9-CM: 276.7  4/4/2022 - Present        UTI (urinary tract infection) ICD-10-CM: N39.0  ICD-9-CM: 599.0  4/4/2022 - Present        Sepsis ([facility] ICD-10-CM: A41.9  ICD-9-CM: 038.9, 995.91  4/4/2022 - Present        RICARDO (acute kidney injury) ([facility] ICD-10-CM: N17.9  ICD-9-CM: 584.9  4/4/2022 - Present        AMS (altered mental status) ICD-10-CM: R41.82  ICD-9-CM: 780.97  4/4/2022 - Present        CVA (cerebral vascular accident) ([facility] ICD-10-CM: I63.9  ICD-9-CM: 434.91  2/21/2022 - Present        Elevated troponin ICD-10-CM: R77.8  ICD-9-CM: 790.6  2/21/2022 - Present        Ischemia of both lower extremities ICD-10-CM: I99.8  ICD-9-CM: 459.89  7/22/2020 - Present        Pain in both lower legs ICD-10-CM: M79.661, M79.662  ICD-9-CM: 729.5  7/22/2020 - Present        Acute osteomyelitis of ankle or foot ([facility] ICD-10-CM: M86.179  ICD-9-CM: 730.07  4/4/2019 - Present        Diabetic peripheral neuropathy ([facility] ICD-10-CM: E11.42  ICD-9-CM: 250.60, 357.2  4/4/2019 - Present        Spinal stenosis in cervical region ICD-10-CM: M48.02  ICD-9-CM: 723.0  4/4/2019 - Present        Type 2 diabetes mellitus with nephropathy ([facility] ICD-10-CM: E11.21  ICD-9-CM: 250.40, 583.81  1/31/2018 - Present        DM type 2 causing vascular disease ([facility] ICD-10-CM: E11.59  ICD-9-CM: 250.70, 443.81  7/27/2017 - Present        Hypercalcemia ICD-10-CM: U65.42  ICD-9-CM: 275.42  12/12/2016 - Present        PAD (peripheral artery disease) ([facility] ICD-10-CM: I73.9  ICD-9-CM: 443.9 2016 - Present        Infection of nailbed of toe of left foot ICD-10-CM: L03.032  ICD-9-CM: 681.11  9/10/2015 - Present        HTN (hypertension) ICD-10-CM: I10  ICD-9-CM: 401.9  Unknown - Present        Hyperlipidemia ICD-10-CM: E78.5  ICD-9-CM: 272.4  Unknown - Present        Neuropathy ICD-10-CM: G62.9  ICD-9-CM: 355.9  Unknown - Present        Sciatica ICD-10-CM: M54.30  ICD-9-CM: 724.3  Unknown - Present        Diabetes mellitus with neurological manifestations, uncontrolled ICD-10-CM: MVS6264  ICD-9-CM: 250.62  2013 - Present               Discharge Condition:     Hospital Course: 78years old female history of CVA with PEG tube placement, chronic kidney disease bedbound with DVT, history of aspiration pneumonia and sacral decubitus ulcer patient was admitted because of acute respiratory failure sacral decubitus ulcer postdebridement sepsis admitted to hospitalist service for comfort care patient subsequently  under hospice care    Consults: hospice    Significant Diagnostic Studies: labs:     No orders to display       Disposition:     Discharge Medications:   Cannot display discharge medications since this patient is not currently admitted. Activity:   Diet:   Wound Care: Wound VAC     Follow-up Appointments   Procedures    FOLLOW UP VISIT Appointment in: 3 - 5 Days     Standing Status:   Standing     Number of Occurrences:   1     Order Specific Question:   Appointment in     Answer:   3 - 5 Days     35 minutes discharge time  Signed By: Rahul Arrieta MD     2022      .

## 2023-03-24 NOTE — Clinical Note
Status[de-identified] INPATIENT [101]   Type of Bed: Intensive Care [6]   Inpatient Hospitalization Certified Necessary for the Following Reasons: 9.  Other (further clarification in H&P documentation)   Admitting Diagnosis: Sepsis ([address] [6936275]   Admitting Diagnosis: UTI (urinary tract infection) [682175]   Admitting Diagnosis: AMS (altered mental status) [3198762]   Admitting Diagnosis: RICARDO (acute kidney injury) [facility]) [6913200]   Admitting Diagnosis: Hyperkalemia [388763]   Admitting Physician: Dennis Null [[id number]]   Attending Physician: Dennis Null [[id number]]   Estimated Length of Stay: 3-4 Midnights   Discharge Plan[de-identified] Home with Office Follow-up [FreeTextEntry1] : Interim history\par The patient is tolerating their medications without problems. There has no new pains, injuries, or complaints and no new issues. The use of medications appears appropriate and there are no aberrant behaviors noted. Side effects to current medications are denied. Average pain score for the month is 6 out of ten. The patient's current medications are documented to the best of their ability. THe  was obtained and reviewed prior to the visit, and any discrepancies were discussed with the patient.\par Objective information\par Since the last visit there are no additional radiologic studies, labs, or pain complaints. There are no changes in the patient's physical status.\par Plan\par The patient was given refill of their medication at their current level and will return to the office as needed for follow-up. The patient is showing no aberrant behavior or evidence of diversion. Opioid contract and opioid risk assessment on chart.  reviewed and any discrepancy discussed with patent. Applicable urine toxicology reviewed and recorded in the patient's electronic record. Urine toxicology is ordered for patient per office protocol or patient's risk assessment.  Patient will follow-up in 1 month unless new issues arise the patient has returned earlier.\par

## 2023-06-24 NOTE — PROGRESS NOTES
Physician Progress Note      Kerrie Miramontes  [facility] #:                  [id number]  :                       1945  ADMIT DATE:       2022 7:13 PM  [address] DATE:        2022 2:11 PM  RESPONDING  PROVIDER #:        Chapo Ferrell MD          QUERY TEXT:    Dr. Bob,  Patient admitted with sepsis and sacral decubitus ulcer. Per Op note dated  documentation of debridement. To accurately reflect the procedure performed please document the deepest depth of tissue removed as down to and including: The medical record reflects the following:  Risk Factors: 78year old female  Clinical Indicators:  Large open wound on the sacrum with sacral decubitus ulcer, nonhealing, wound size measures to be at least 13 x 15cm  Treatment:  Op note states - Sacral wound excisional wound debridement 13 x 15 cm to the bone. Does this include bone? Thank you,  Eneida Patel RN, CRCR  [email or web address]  You can also contact me via Perfect Serve. Options provided:  -- Excisional debridement of subcutaneous tissue & fascia  -- Excisional debridement of muscle  -- Excisional debridement of bone  -- Other - I will add my own diagnosis  -- Disagree - Not applicable / Not valid  -- Disagree - Clinically unable to determine / Unknown  -- Refer to Clinical Documentation Reviewer    PROVIDER RESPONSE TEXT:    Excisional debridement of muscle of sacrum was performed during procedure on . Query created by:  Melania Cole on 2022 11:32 AM      Electronically signed by:  Chapo Ferrell MD 2022 5:36 AM normal...

## 2023-12-15 NOTE — ANESTHESIA PREPROCEDURE EVALUATION
Relevant Problems   NEUROLOGY   (+) CVA (cerebral vascular accident) ([facility] [address]      CARDIOVASCULAR   (+) HTN (hypertension)   (+) PAD (peripheral artery disease) (HCC)      RENAL FAILURE   (+) RICARDO (acute kidney injury) ([facility] [address]      ENDOCRINE   (+) DM type 2 causing vascular disease (HCC)   (+) Type 2 diabetes mellitus with nephropathy (HCC)      HEMATOLOGY   (+) Acute osteomyelitis of ankle or foot (HCC)       Anesthetic History   No history of anesthetic complications            Review of Systems / Medical History  Patient summary reviewed, nursing notes reviewed and pertinent labs reviewed    Pulmonary  Within defined limits                 Neuro/Psych       CVA  TIA    Comments: Metabolic Encephalopathy  Peripheral Neuropathy Cardiovascular    Hypertension          Past MI, CAD, PAD and hyperlipidemia      Comments: 5/1/2022 EKG:    Normal sinus rhythm   Nonspecific ST abnormality   Abnormal QRS-T angle, consider primary T wave abnormality   Baseline artifact   Abnormal ECG     Confirmed by Mireya Salas ([id number]) on 5/1/2022 4:17:43 PM         4/2022 TTE:  Interpretation Summary    Left Ventricle: Left ventricle size is normal. Normal wall thickness. Normal wall motion. Low normal left ventricular systolic function with a visually estimated EF of 50 - 55%.   Mitral Valve: Mild transvalvular regurgitation.   Tricuspid Valve: Moderate transvalvular regurgitation.      GI/Hepatic/Renal       Hepatitis: type C  Renal disease (Creatinine 1.55): CRI       Endo/Other    Diabetes: type 2, using insulin    Blood dyscrasia (INR 1.4) and anemia (HGB 7.6)     Other Findings   Comments:       Procedure Information    Case: [id number] Date/Time: 05/18/22 1905  Procedures:        Laparoscopic Loop Colostomy, Sacral Wound Debridement And Left Transmetatarsal Amputation (N/A )       . (Left )  Anesthesia type: general  Pre-op diagnosis: No Codes Given  Location: [facility] MAIN OR ADD-ON / [facility] MAIN OR  Surgeons: Jennifer Vasques MD Physical Exam    Airway  Mallampati: III  TM Distance: 4 - 6 cm  Neck ROM: normal range of motion   Mouth opening: Normal     Cardiovascular    Rhythm: regular  Rate: normal         Dental    Dentition: Poor dentition     Pulmonary  Breath sounds clear to auscultation               Abdominal  GI exam deferred       Other Findings   Comments: Results for Camila Bunn (MRN [id number]) as of 5/18/2022 09:54    5/18/2022 07:14  Sodium: 144  Potassium: 5.6 (H)  Chloride: 113 (H)  CO2: 22  Anion gap: 9  Glucose: 289 (H)  BUN: 66 (H)  Creatinine: 1.55 (H)  BUN/Creatinine ratio: 43 (H)  Calcium: 8.3 (L)  Phosphorus: 3.9  Magnesium: 2.9 (H)  GFR est non-AA: 33 (L)  GFR est AA: 40 (L)  Bilirubin, total: 0.3  Protein, total: 6.5  Albumin: 1.6 (L)  Globulin: 4.9 (H)  A-G Ratio: 0.3 (L)  ALT: 1,492 (H)  AST: >2,000 (H)  Alk. phosphatase: 108         Anesthetic Plan    ASA: 4  Anesthesia type: general          Induction: Intravenous  Anesthetic plan and risks discussed with: Son / Daughter      Telephone Consent obtained from patient's daughter, Shira Miner [phone]. Also discussed with the patient's daughter the plan for the patient to go to the ICU intubated and sedated post-operatively. 98.6

## 2024-11-19 NOTE — PROGRESS NOTES
Comprehensive Nutrition Assessment    Type and Reason for Visit: Reassess (interim)    Nutrition Recommendations/Plan:   1. Continue NPO  2. With surgeon approval, initiate the following s/p procedure:        TF via PEG to Vital 1.2 at goal of 40mL/hr        Flush with 150 mL H2O Q4H        Provide 1 pkt prosource 3x/d (3pkt/d; 180kcals, 45g pro)        Provides 1332 kcal (81%), 117g PRO (100%), 1679 mL H2O (102%). Propofol at 25mcg/kg/min providing 314kcals/d (100%)     4. Please document TF rate, tolerance, BM in I/Os. Malnutrition Assessment:  Malnutrition Status:  Severe malnutrition (05/19/22 1143)    Context:  Chronic illness       Nutrition Assessment:    Admitted for sacral PI, (4/21) Debridment. (5/19) Plan for procedure however delayed d/t hypotension. No pressor started, MAP 65-70mmHg, pt now intubated in ICU. Remains intubated, (5/23) Plan for repeat I&D, L TMA, loop colostomy Pt on Glucerna pta, started on same formula at admit and tolerated well at goal rate. TF adjusted to Vital s/p intubation, ?why rate decreased to 40ml/hr, however overall tolerated well with minimal GRVs. TF held today in anticipation of procedure. Restart TF s/p procedure, lower rate d/t propofol need. Labs: H/H 9.0/28.6, Na 147, BUN 48, Cr 1.05, -240, AST 58, , Alb 1.5. Meds: Probiotic, ampicillin,  Questran Light, pepcid, Tricor, FeSu, Insulin, Propofol at 25mcg/kg/min. Nutrition Related Findings:    No N/V/D/C nor s/s of aspiration per Nsg. PEG in place. Remains NPO. Generalized non-pitting edema. Last BM 5/19, loose, chronic diarrhea per daughter.  Wound Type: Surgical incision,Open wounds,Partial thickness (Partial Thickness- coccyx.)    Current Nutrition Intake & Therapies:  Average Meal Intake: NPO  Average Supplement Intake: NPO  DIET NPO  ADULT TUBE FEEDING PEG; Peptide Based; Delivery Method: Continuous; Continuous Initial Rate (mL/hr): 40; Continuous Advance Tube Feeding: No; Water Flush Discussion: of the minor procedure to be done in the office. Tissue to be submitted for pathology review. Risk Assessment Explanation (Does Not Render In The Note): Clinical determination of the probability and/or consequences of an event, such as surgery. Clinical assessment of the level of risk is affected by the nature of the event under consideration for the patient. Modifier 57 is used to indicate an Evaluation and Management (E/M) service resulted in the initial decision to perform surgery either the day before a major surgery (90 day global) or the day of a major surgery. Volume (mL): 150; Water Flush Frequency: Q 4 hours; Modulars/Additives: Protein; Specify How . .. Additional Calorie Sources:  · Propofol at 25mcg/kg/min providing 314kcals/d      Anthropometric Measures:  Height: 5' 7.01\" (170.2 cm)  Ideal Body Weight (IBW): 135 lbs (61 kg)  Admission Body Weight: 175 lb  Current Body Wt:  89.2 kg (196 lb 10.4 oz) (5/23), 145.7 % IBW. Bed scale  Current BMI (kg/m2): 30.8        Weight Adjustment:  (EDW 80kg; BMI 27.6)                 BMI Category: Overweight (BMI 25.0-29. 9)    Estimated Daily Nutrient Needs:  Energy Requirements Based On: Formula  Weight Used for Energy Requirements: Other (specify) (EDW)  Energy (kcal/day): 1654kcals (PSU 2003b +250 for wounds)  Weight Used for Protein Requirements: Other (specify) (EDW)  Protein (g/day): 112-128g (1.4-1.6g/kg)  Method Used for Fluid Requirements: 1 ml/kcal  Fluid (ml/day): 1654ml    Nutrition Diagnosis:   · Severe malnutrition,In context of chronic illness related to catabolic illness as evidenced by weight loss greater than or equal to 5% in 1 month,severe muscle loss      Nutrition Interventions:   Food and/or Nutrient Delivery: Continue NPO,Modify tube feeding  Nutrition Education/Counseling: No recommendations at this time  Coordination of Nutrition Care: Continue to monitor while inpatient  Plan of Care discussed with: RN    Goals:  Previous Goal Met: Progressing toward goal(s)  Goals: Meet at least 75% of estimated needs,Tolerate nutrition support at goal rate,other (specify)  Specify Other Goals: Maintain BGs <180 mg/dL.  Maintain CBW within +/- 0.5 kg    Nutrition Monitoring and Evaluation:   Behavioral-Environmental Outcomes: None identified  Food/Nutrient Intake Outcomes: Enteral nutrition intake/tolerance  Physical Signs/Symptoms Outcomes: Biochemical data,Weight,Skin,GI status    Discharge Planning:    Enteral nutrition    Brenda Baez  Contact: Ext [phone], or via [employer] (QEEP) Initial Decision For Surgery?: No

## 2025-06-22 NOTE — PROGRESS NOTES
Progress Note  Date: 2022       Room:[facility]  Patient Melanie Benz     YOB: 1945     Age:77 y.o. Subjective    Subjective   Pt seen at [facility]. No acute overnight events per nursing. Family at [facility] has questions regarding care    Review of Systems   Unable to accurately obtain due to patient's current medical status    Objective         Vitals Last 24 Hours:  TEMPERATURE:  Temp  Av.3 °F (37.4 °C)  Min: 98 °F (36.7 °C)  Max: 99.9 °F (37.7 °C)  RESPIRATIONS RANGE: Resp  Av.3  Min: 18  Max: 19  PULSE OXIMETRY RANGE: SpO2  Av.8 %  Min: 95 %  Max: 99 %  PULSE RANGE: Pulse  Av.5  Min: 64  Max: 82  BLOOD PRESSURE RANGE: Systolic (38IUO), BOW:024 , Min:129 , MADYSON:070   ; Diastolic (16WCP), LHI:36, Min:47, Max:62    I/O (24Hr): Intake/Output Summary (Last 24 hours) at 3/10/2022 0809  Last data filed at 3/10/2022 0154  Gross per 24 hour   Intake 600 ml   Output 650 ml   Net -50 ml     Objective   GEN: Pt is in NAD. No dressing noted to the B/L feet. Daughters noted at [facility]. Bunny boots noted to be in place  DERM: Dry gangrene noted to the left great toe. +PTB. No proximal lymphatic streaking noted  VASC: Pedal pulses (DP/PT) palpable to B/L LE. CFT<3sec to all digits of B/L LE. No pedal hair growth noted to the level of the digits for B/L LE. Skin temp is warm to warm from proximal to distal for B/L LE.   NEURO: Unable to accurately assess due to current medical status  MSK: No gross deformities. Good muscle tone and bulk noted to B/L LE.  PSYCH: Sedated    Labs/Imaging/Diagnostics    Labs:  CBC:  Recent Labs     22  0740   WBC 11.8*   RBC 2.67*   HGB 7.7*   HCT 24.5*   MCV 91.8   RDW 15.9*   *     CHEMISTRIES:  Recent Labs     22  0740   *   K 4.8   *   CO2 25   BUN 32*   CA 8.4*   PT/INR:  Recent Labs     22  0632 22  0740   INR 1.5* 1.4*     APTT:No results for input(s): APTT in the last 72 hours.   LIVER PROFILE:  Recent Labs 03/09/22  0632 03/08/22  0740   AST 27 27   ALT 80* 98*     Lab Results   Component Value Date/Time    ALT (SGPT) 80 (H) 03/09/2022 06:32 AM    AST (SGOT) 27 03/09/2022 06:32 AM    Alk. phosphatase 83 03/09/2022 06:32 AM    Bilirubin, direct 0.1 03/09/2022 06:32 AM    Bilirubin, total 0.3 03/09/2022 06:32 AM       Imaging Last 24 Hours:  No results found. Assessment//Plan   Active Problems:    CVA (cerebral vascular accident) ([facility] [address] (2/21/2022)      Elevated troponin (2/21/2022)      Assessment & Plan       Dry Gangrene, Left Great Toe  Uncontrolled DM T2  PAD           Patient seen and evaluated at bedside in the ICU  - Current labs personally reviewed  - Per interventional cardio, pt is not a candidate for intervention and will proceed with conservative care   - Continue to leave open to air at this time no wound care needed. Paint with betadine is drainage noted  - Strict offloading for wound healing purposes and ulcer prevention  - Abx per ID  - Pt stable for DC from podiatry standpoint with outpatient f/u in my office  - I will follow closely while pt still in house     In-depth conversation had with family regarding treatment options. Family stated patient declined intervention in the recent past and they would like to honor that decision. Also discussed with interventional cardiology, who states patient is not a candidate for intervention. Since intervention will not be performed, conservative wound care at this time is the best option. I will continue to follow periodically and update recommendations as needed          Victor Hugo Almeida, [facility], [facility]  [address] [place] [address], [address] [place] [address], [address] [place] [address],[address]  [facility] [place]:  [phone]  F:  [phone]  C:  [phone]    Electronically signed by Breana Sotelo DPM on 3/10/2022 at 8:09 AM [phone]

## (undated) DEVICE — CORD ES L10FT MPLR LAP

## (undated) DEVICE — GARMENT,MEDLINE,DVT,INT,CALF,MED, GEN2: Brand: MEDLINE

## (undated) DEVICE — TROCAR: Brand: KII FIOS FIRST ENTRY

## (undated) DEVICE — 2, DISPOSABLE SUCTION/IRRIGATOR WITHOUT DISPOSABLE TIP: Brand: STRYKEFLOW

## (undated) DEVICE — GOWN,NON-REINFORCED,XXL: Brand: MEDLINE

## (undated) DEVICE — DRAPE,REIN 53X77,STERILE: Brand: MEDLINE

## (undated) DEVICE — SOLUTION IRRIG 500ML 0.9% SOD CHL USP POUR PLAS BTL

## (undated) DEVICE — MINOR GENERAL PACK: Brand: MEDLINE INDUSTRIES, INC.

## (undated) DEVICE — LAPAROSCOPIC CHOLE PACK: Brand: MEDLINE INDUSTRIES, INC.

## (undated) DEVICE — SYR 10ML LUER LOK 1/5ML GRAD --

## (undated) DEVICE — COVER,MAYO STAND,STERILE: Brand: MEDLINE

## (undated) DEVICE — SPONGE LAP 18X18IN STRL -- 5/PK

## (undated) DEVICE — CYSTO PACK: Brand: MEDLINE INDUSTRIES, INC.

## (undated) DEVICE — GLOVE ORANGE PI 7 1/2   MSG9075

## (undated) DEVICE — WET SKIN PREP TRAY: Brand: MEDLINE INDUSTRIES, INC.

## (undated) DEVICE — CATHETER,URETHRAL,REDRUBBER,STRL,18FR: Brand: MEDLINE

## (undated) DEVICE — SUT 1 36 VIO MONO CTX -- PDS PLUS

## (undated) DEVICE — PREP PAD BNS: Brand: CONVERTORS

## (undated) DEVICE — YANKAUER,BULB TIP,W/O VENT,RIGID,STERILE: Brand: MEDLINE

## (undated) DEVICE — T-DRAPE,EXTREMITY,STERILE: Brand: MEDLINE

## (undated) DEVICE — MASTISOL ADHESIVE LIQ 2/3ML

## (undated) DEVICE — BASIC SINGLE BASIN-LF: Brand: MEDLINE INDUSTRIES, INC.

## (undated) DEVICE — SOUTHSIDE TURNOVER: Brand: MEDLINE INDUSTRIES, INC.

## (undated) DEVICE — TOTAL TRAY, 16FR 10ML SIL FOLEY, URN: Brand: MEDLINE

## (undated) DEVICE — SUTURE SZ 0 27IN 5/8 CIR UR-6  TAPER PT VIOLET ABSRB VICRYL J603H

## (undated) DEVICE — SUT MONOCRYL PLUS UD 4-0 --

## (undated) DEVICE — TROCARS: Brand: KII® BALLOON BLUNT TIP SYSTEM

## (undated) DEVICE — SUTURE VCRL + SZ 3-0 L18IN ABSRB UD SH 1/2 CIR TAPERCUT NDL VCP864D

## (undated) DEVICE — PREP SKN CHLRAPRP APL 26ML STR --

## (undated) DEVICE — SOLUTION IRRIG 1000ML 0.9% SOD CHL USP POUR PLAS BTL

## (undated) DEVICE — TROCAR: Brand: KII SLEEVE

## (undated) DEVICE — SOL INJ SOD CL 0.9% 1000ML BG --

## (undated) DEVICE — DERMABOND SKIN ADH 0.7ML -- DERMABOND ADVANCED 12/BX

## (undated) DEVICE — TOWEL,OR,DSP,ST,BLUE,DLX,6/PK,12PK/CS: Brand: MEDLINE

## (undated) DEVICE — INTENDED FOR TISSUE SEPARATION, AND OTHER PROCEDURES THAT REQUIRE A SHARP SURGICAL BLADE TO PUNCTURE OR CUT.: Brand: BARD-PARKER ® CARBON RIB-BACK BLADES

## (undated) DEVICE — TOWEL SURG W17XL27IN STD BLU COT NONFENESTRATED PREWASHED

## (undated) DEVICE — KIT GASTMY PERC PEG PULL 20FR -- ENDOVIVE BX/2

## (undated) DEVICE — SYRINGE IRRIG 60ML SFT PLIABLE BLB EZ TO GRP 1 HND USE W/

## (undated) DEVICE — VISUALIZATION SYSTEM: Brand: CLEARIFY

## (undated) DEVICE — SUTURE SILK PERMAHAND PRECUT 6 X 30 IN SZ 1 BLK BRAID A307H

## (undated) DEVICE — CONTAINER,SPECIMEN,PNEU TUBE,4OZ,OR STRL: Brand: MEDLINE

## (undated) DEVICE — SKIN PREP POV IOD SOL BTL 4OZ --

## (undated) DEVICE — TUBING INSUFFLATOR HEAT HUMIDIFIED SMK EVAC SET PNEUMOCLEAR

## (undated) DEVICE — 3-0 COATED VICRYL PLUS UNDYED 1X27" SH --

## (undated) DEVICE — GASTROSTOMY KIT STD 24 FR SAFETY PEG N RADLUC STRL ENDOVIVE

## (undated) DEVICE — Device: Brand: JELCO

## (undated) DEVICE — LAPAROSCOPIC SCISSORS: Brand: EPIX LAPAROSCOPIC SCISSORS